# Patient Record
Sex: FEMALE | Race: WHITE | Employment: UNEMPLOYED | URBAN - NONMETROPOLITAN AREA
[De-identification: names, ages, dates, MRNs, and addresses within clinical notes are randomized per-mention and may not be internally consistent; named-entity substitution may affect disease eponyms.]

---

## 2017-02-20 DIAGNOSIS — Z12.31 ENCOUNTER FOR SCREENING MAMMOGRAM FOR BREAST CANCER: Primary | ICD-10-CM

## 2017-02-21 ENCOUNTER — TELEPHONE (OUTPATIENT)
Dept: NEUROLOGY | Age: 42
End: 2017-02-21

## 2017-03-20 RX ORDER — LEVONORGESTREL AND ETHINYL ESTRADIOL 90; 20 UG/1; UG/1
1 TABLET ORAL DAILY
Qty: 28 TABLET | Refills: 1 | Status: SHIPPED | OUTPATIENT
Start: 2017-03-20 | End: 2017-06-15 | Stop reason: SDUPTHER

## 2017-04-26 ENCOUNTER — OFFICE VISIT (OUTPATIENT)
Dept: OBSTETRICS AND GYNECOLOGY | Facility: CLINIC | Age: 42
End: 2017-04-26

## 2017-04-26 ENCOUNTER — TRANSCRIBE ORDERS (OUTPATIENT)
Dept: ADMINISTRATIVE | Facility: HOSPITAL | Age: 42
End: 2017-04-26

## 2017-04-26 ENCOUNTER — HOSPITAL ENCOUNTER (OUTPATIENT)
Dept: GENERAL RADIOLOGY | Facility: HOSPITAL | Age: 42
Discharge: HOME | End: 2017-04-26
Attending: PAIN MEDICINE | Admitting: PAIN MEDICINE

## 2017-04-26 VITALS
HEIGHT: 67 IN | BODY MASS INDEX: 26.68 KG/M2 | SYSTOLIC BLOOD PRESSURE: 110 MMHG | WEIGHT: 170 LBS | DIASTOLIC BLOOD PRESSURE: 84 MMHG

## 2017-04-26 DIAGNOSIS — Z00.00 WELL WOMAN EXAM WITHOUT GYNECOLOGICAL EXAM: Primary | ICD-10-CM

## 2017-04-26 DIAGNOSIS — E34.9 HORMONE DISORDER: ICD-10-CM

## 2017-04-26 DIAGNOSIS — M47.812 ARTHROPATHY OF CERVICAL FACET JOINT: Primary | ICD-10-CM

## 2017-04-26 DIAGNOSIS — M50.30 DEGENERATION OF CERVICAL INTERVERTEBRAL DISC: ICD-10-CM

## 2017-04-26 DIAGNOSIS — G43.009 NONINTRACTABLE MIGRAINE, UNSPECIFIED MIGRAINE TYPE: ICD-10-CM

## 2017-04-26 DIAGNOSIS — E55.9 VITAMIN D DEFICIENCY: ICD-10-CM

## 2017-04-26 DIAGNOSIS — Z12.31 ENCOUNTER FOR SCREENING MAMMOGRAM FOR MALIGNANT NEOPLASM OF BREAST: ICD-10-CM

## 2017-04-26 PROCEDURE — 87624 HPV HI-RISK TYP POOLED RSLT: CPT | Performed by: NURSE PRACTITIONER

## 2017-04-26 PROCEDURE — 99395 PREV VISIT EST AGE 18-39: CPT | Performed by: NURSE PRACTITIONER

## 2017-04-26 PROCEDURE — 87798 DETECT AGENT NOS DNA AMP: CPT | Performed by: NURSE PRACTITIONER

## 2017-04-26 PROCEDURE — 87481 CANDIDA DNA AMP PROBE: CPT | Performed by: NURSE PRACTITIONER

## 2017-04-26 PROCEDURE — 72050 X-RAY EXAM NECK SPINE 4/5VWS: CPT

## 2017-04-26 PROCEDURE — 87661 TRICHOMONAS VAGINALIS AMPLIF: CPT | Performed by: NURSE PRACTITIONER

## 2017-04-26 PROCEDURE — 87512 GARDNER VAG DNA QUANT: CPT | Performed by: NURSE PRACTITIONER

## 2017-04-26 PROCEDURE — G0123 SCREEN CERV/VAG THIN LAYER: HCPCS | Performed by: NURSE PRACTITIONER

## 2017-04-26 RX ORDER — GABAPENTIN 300 MG/1
CAPSULE ORAL
Refills: 0 | COMMUNITY
Start: 2017-03-27 | End: 2020-07-14

## 2017-04-26 RX ORDER — DULOXETIN HYDROCHLORIDE 60 MG/1
CAPSULE, DELAYED RELEASE ORAL
Refills: 0 | COMMUNITY
Start: 2017-03-27 | End: 2020-07-14

## 2017-04-26 RX ORDER — RIZATRIPTAN BENZOATE 10 MG/1
10 TABLET ORAL ONCE AS NEEDED
Refills: 0 | COMMUNITY
Start: 2017-02-21 | End: 2021-09-24 | Stop reason: HOSPADM

## 2017-04-26 RX ORDER — NORTRIPTYLINE HYDROCHLORIDE 75 MG/1
CAPSULE ORAL
Refills: 0 | COMMUNITY
Start: 2017-03-27 | End: 2020-07-14

## 2017-04-26 RX ORDER — BUTALBITAL, ACETAMINOPHEN AND CAFFEINE 50; 325; 40 MG/1; MG/1; MG/1
1 TABLET ORAL EVERY 4 HOURS PRN
COMMUNITY
Start: 2017-04-24 | End: 2021-09-24 | Stop reason: HOSPADM

## 2017-04-26 RX ORDER — TIZANIDINE 4 MG/1
TABLET ORAL
Refills: 0 | Status: ON HOLD | COMMUNITY
Start: 2017-03-27 | End: 2021-09-10

## 2017-04-26 NOTE — PROGRESS NOTES
Subjective   Namita Zabala is a 41 y.o. female  YOB: 1975    Est PT is here today for yearly visit.  Pt states that she is doing good with no c/o  Pt does need order for Mammo today as well      Chief Complaint   Patient presents with   • Gynecologic Exam     Here for annual exam, pap smear.  LPS 4-14-16 WNL. Hx of migraines with treatment per Pain Management, injections. She is feeling very dizzy today after injections. Has mammogram scheduled for May 2017.        HPI    The following portions of the patient's history were reviewed and updated as appropriate: allergies, current medications, past family history, past medical history, past social history, past surgical history and problem list.    Allergies   Allergen Reactions   • Penicillins        Past Medical History:   Diagnosis Date   • Angina at rest    • Migraine     Sees Pain Management for injections.    • MVP (mitral valve prolapse)    • Syncope        Family History   Problem Relation Age of Onset   • Colon cancer Maternal Aunt 52   • Fibromyalgia Mother    • Heart disease Mother    • Hypertension Mother    • Hodgkin's lymphoma Paternal Grandmother    • Ovarian cancer Neg Hx    • Breast cancer Neg Hx        Social History     Social History   • Marital status:      Spouse name: N/A   • Number of children: N/A   • Years of education: N/A     Occupational History   • Not on file.     Social History Main Topics   • Smoking status: Never Smoker   • Smokeless tobacco: Never Used   • Alcohol use No   • Drug use: No   • Sexual activity: Not on file     Other Topics Concern   • Not on file     Social History Narrative         Current Outpatient Prescriptions:   •  butalbital-acetaminophen-caffeine (FIORICET, ESGIC) -40 MG per tablet, , Disp: , Rfl:   •  DULoxetine (CYMBALTA) 60 MG capsule, TK 1 C PO HS, Disp: , Rfl: 0  •  gabapentin (NEURONTIN) 300 MG capsule, TK 3 CS PO HS, Disp: , Rfl: 0  •  levonorgestrel-ethinyl  estradiol (LYBREL) 90-20 MCG per tablet, Take 1 tablet by mouth Daily., Disp: 28 tablet, Rfl: 1  •  nortriptyline (PAMELOR) 75 MG capsule, TK 1 C PO HS, Disp: , Rfl: 0  •  rizatriptan (MAXALT) 10 MG tablet, TK 1 T PO AT ONSET OF HEADACHE. MAY REPEAT EVERY 2 HOURS AS NEEDED. MAX OF 3 TABLETS IN 24 HOURS, Disp: , Rfl: 0  •  tiZANidine (ZANAFLEX) 4 MG tablet, TK 1 T PO TID, Disp: , Rfl: 0    Menstrual History:  OB History      Para Term  AB TAB SAB Ectopic Multiple Living    0 0 0 0 0 0 0 0 0 0           No LMP recorded. Patient is not currently having periods (Reason: Oral contraceptives).    Sexual History:         Could not be calculated    Past Surgical History:   Procedure Laterality Date   • BREAST BIOPSY     • TONSILLECTOMY         Review of Systems   Constitutional: Negative for activity change, appetite change, chills, diaphoresis, fatigue, fever and unexpected weight change.   HENT: Negative for congestion, ear discharge, ear pain, facial swelling, hearing loss, mouth sores, nosebleeds, postnasal drip, rhinorrhea, sinus pressure, sneezing, sore throat, tinnitus, trouble swallowing and voice change.    Eyes: Negative for photophobia, pain, discharge, redness, itching and visual disturbance.   Respiratory: Negative for apnea, cough, choking, chest tightness and shortness of breath.    Cardiovascular: Negative for chest pain, palpitations and leg swelling.   Gastrointestinal: Negative for abdominal distention, abdominal pain, anal bleeding, blood in stool, constipation, diarrhea, nausea, rectal pain and vomiting.   Endocrine: Negative for cold intolerance and heat intolerance.   Genitourinary: Negative for decreased urine volume, difficulty urinating, dyspareunia, flank pain, frequency, genital sores, hematuria, menstrual problem, pelvic pain, urgency, vaginal bleeding, vaginal discharge and vaginal pain.   Musculoskeletal: Negative for arthralgias, back pain, joint swelling and myalgias.   Skin:  Negative for color change and rash.   Allergic/Immunologic: Negative for environmental allergies.   Neurological: Negative for dizziness, syncope, weakness, numbness and headaches.   Hematological: Negative for adenopathy.   Psychiatric/Behavioral: Negative for agitation, confusion and sleep disturbance. The patient is not nervous/anxious.        Objective   Physical Exam   Constitutional: She is oriented to person, place, and time. She appears well-developed and well-nourished. No distress.   HENT:   Head: Normocephalic.   Right Ear: External ear normal.   Left Ear: External ear normal.   Nose: Nose normal.   Mouth/Throat: Oropharynx is clear and moist.   Eyes: Conjunctivae are normal. Right eye exhibits no discharge. Left eye exhibits no discharge.   Neck: Normal range of motion. Neck supple. Carotid bruit is not present. No tracheal deviation present. No thyromegaly present.   Cardiovascular: Normal rate, regular rhythm, normal heart sounds and intact distal pulses.    No murmur heard.  Pulmonary/Chest: Effort normal and breath sounds normal. No respiratory distress. She has no wheezes. Right breast exhibits no inverted nipple, no mass, no nipple discharge, no skin change and no tenderness. Left breast exhibits no inverted nipple, no mass, no nipple discharge, no skin change and no tenderness. Breasts are symmetrical. There is no breast swelling.   Abdominal: Soft. She exhibits no distension and no mass. There is no tenderness. There is no guarding. No hernia. Hernia confirmed negative in the right inguinal area and confirmed negative in the left inguinal area.   Genitourinary: Rectum normal, vagina normal and uterus normal. Rectal exam shows no external hemorrhoid, no internal hemorrhoid, no fissure, no mass, no tenderness and anal tone normal. No breast tenderness, discharge or bleeding. Pelvic exam was performed with patient supine. There is no rash, tenderness, lesion or injury on the right labia. There is no  "rash, tenderness, lesion or injury on the left labia. Uterus is not enlarged, not fixed and not tender. Cervix exhibits no motion tenderness, no discharge and no friability. Right adnexum displays no mass, no tenderness and no fullness. Left adnexum displays no mass, no tenderness and no fullness. No bleeding in the vagina. No vaginal discharge found.   Genitourinary Comments:   BSU normal  Urethral meatus  Normal  Perineum  Normal   Musculoskeletal: Normal range of motion. She exhibits no edema or tenderness.   Lymphadenopathy:        Head (right side): No submental, no submandibular, no tonsillar, no preauricular, no posterior auricular and no occipital adenopathy present.        Head (left side): No submental, no submandibular, no tonsillar, no preauricular, no posterior auricular and no occipital adenopathy present.     She has no cervical adenopathy.        Right cervical: No superficial cervical, no deep cervical and no posterior cervical adenopathy present.       Left cervical: No superficial cervical, no deep cervical and no posterior cervical adenopathy present.     She has no axillary adenopathy.        Right: No inguinal adenopathy present.        Left: No inguinal adenopathy present.   Neurological: She is alert and oriented to person, place, and time. Coordination normal.   Skin: Skin is warm and dry. No bruising and no rash noted. She is not diaphoretic. No erythema.   Psychiatric: She has a normal mood and affect. Her behavior is normal. Judgment and thought content normal.   Nursing note and vitals reviewed.        Vitals:    04/26/17 1055   BP: 110/84   BP Location: Left arm   Patient Position: Sitting   Cuff Size: Adult   Weight: 170 lb (77.1 kg)   Height: 67\" (170.2 cm)       Namita was seen today for gynecologic exam.    Diagnoses and all orders for this visit:    Well woman exam without gynecological exam  Comments:  Normal well woman exam.  Thin prep done.  Mammogram ordered.  "     Nonintractable migraine, unspecified migraine type  Comments:  In Pain Management r/t migraines and neck issues.  Patient has lab order from Dr. Adair's office requesting a hormone panel, D and LFTs.    Orders:  -     Liquid-based Pap Smear, Screening  -     Cortisol  -     DHEA-Sulfate  -     Estradiol  -     Follicle Stimulating Hormone  -     Luteinizing Hormone  -     Progesterone  -     Testosterone  -     Hepatic Function Panel; Future    Hormone disorder  Comments:  Patient brought lab order from Dr. Adair requesting a hormone panel be done.  To be done today.   Orders:  -     Liquid-based Pap Smear, Screening  -     Cortisol  -     DHEA-Sulfate  -     Estradiol  -     Follicle Stimulating Hormone  -     Luteinizing Hormone  -     Progesterone  -     Testosterone  -     Hepatic Function Panel; Future    Vitamin D deficiency  Comments:  Vitamin D level to be done today.    Orders:  -     Vitamin D 25 Hydroxy    Encounter for screening mammogram for malignant neoplasm of breast  Comments:  Mammogram ordered.   Orders:  -     Mammo Screening Digital Tomosynthesis Bilateral With CAD; Future        Body mass index is 26.63 kg/(m^2).     Non-Smoker    MyChart Instructions Given

## 2017-04-27 LAB
25(OH)D3+25(OH)D2 SERPL-MCNC: 44.8 NG/ML (ref 30–100)
CORTIS SERPL-MCNC: 14.6 UG/DL
DHEA-S SERPL-MCNC: 102 UG/DL (ref 57.3–279.2)
ESTRADIOL SERPL-MCNC: 7 PG/ML
FSH SERPL-ACNC: 4.3 MIU/ML
LH SERPL-ACNC: 3.3 MIU/ML
PROGEST SERPL-MCNC: <0.1 NG/ML
TESTOST SERPL-MCNC: 11 NG/DL (ref 8–48)

## 2017-05-01 ENCOUNTER — TELEPHONE (OUTPATIENT)
Dept: OBSTETRICS AND GYNECOLOGY | Facility: CLINIC | Age: 42
End: 2017-05-01

## 2017-05-01 ENCOUNTER — RESULTS ENCOUNTER (OUTPATIENT)
Dept: OBSTETRICS AND GYNECOLOGY | Facility: CLINIC | Age: 42
End: 2017-05-01

## 2017-05-01 DIAGNOSIS — E34.9 HORMONE DISORDER: ICD-10-CM

## 2017-05-01 DIAGNOSIS — B37.31 YEAST VAGINITIS: Primary | ICD-10-CM

## 2017-05-01 DIAGNOSIS — G43.009 NONINTRACTABLE MIGRAINE, UNSPECIFIED MIGRAINE TYPE: ICD-10-CM

## 2017-05-03 LAB
GEN CATEG CVX/VAG CYTO-IMP: ABNORMAL
LAB AP CASE REPORT: ABNORMAL
LAB AP GYN ADDITIONAL INFORMATION: ABNORMAL
LAB AP GYN OTHER FINDINGS: ABNORMAL
Lab: ABNORMAL
PATH INTERP SPEC-IMP: ABNORMAL
STAT OF ADQ CVX/VAG CYTO-IMP: ABNORMAL

## 2017-05-04 ENCOUNTER — TELEPHONE (OUTPATIENT)
Dept: OBSTETRICS AND GYNECOLOGY | Facility: CLINIC | Age: 42
End: 2017-05-04

## 2017-05-05 ENCOUNTER — TELEPHONE (OUTPATIENT)
Dept: OBSTETRICS AND GYNECOLOGY | Facility: CLINIC | Age: 42
End: 2017-05-05

## 2017-05-09 ENCOUNTER — APPOINTMENT (OUTPATIENT)
Dept: MAMMOGRAPHY | Facility: HOSPITAL | Age: 42
End: 2017-05-09

## 2017-05-22 ENCOUNTER — HOSPITAL ENCOUNTER (OUTPATIENT)
Dept: MAMMOGRAPHY | Facility: HOSPITAL | Age: 42
Discharge: HOME | End: 2017-05-22
Admitting: NURSE PRACTITIONER

## 2017-05-22 DIAGNOSIS — Z12.31 ENCOUNTER FOR SCREENING MAMMOGRAM FOR BREAST CANCER: ICD-10-CM

## 2017-05-22 PROCEDURE — 77063 BREAST TOMOSYNTHESIS BI: CPT

## 2017-05-22 PROCEDURE — G0202 SCR MAMMO BI INCL CAD: HCPCS

## 2017-06-15 RX ORDER — LEVONORGESTREL AND ETHINYL ESTRADIOL 90; 20 UG/1; UG/1
1 TABLET ORAL DAILY
Qty: 28 TABLET | Refills: 9 | Status: SHIPPED | OUTPATIENT
Start: 2017-06-15 | End: 2017-06-19 | Stop reason: SDUPTHER

## 2017-06-19 RX ORDER — LEVONORGESTREL AND ETHINYL ESTRADIOL 90; 20 UG/1; UG/1
1 TABLET ORAL DAILY
Qty: 28 TABLET | Refills: 9 | Status: SHIPPED | OUTPATIENT
Start: 2017-06-19 | End: 2018-02-07 | Stop reason: SDUPTHER

## 2017-07-07 ENCOUNTER — TELEPHONE (OUTPATIENT)
Dept: FAMILY MEDICINE CLINIC | Age: 42
End: 2017-07-07

## 2017-07-13 ENCOUNTER — HOSPITAL ENCOUNTER (EMERGENCY)
Facility: HOSPITAL | Age: 42
Discharge: HOME | End: 2017-07-14
Attending: EMERGENCY MEDICINE | Admitting: EMERGENCY MEDICINE

## 2017-07-13 DIAGNOSIS — R11.2 NON-INTRACTABLE VOMITING WITH NAUSEA, UNSPECIFIED VOMITING TYPE: Primary | ICD-10-CM

## 2017-07-13 PROCEDURE — 99284 EMERGENCY DEPT VISIT MOD MDM: CPT

## 2017-07-14 VITALS
WEIGHT: 168 LBS | TEMPERATURE: 98.2 F | OXYGEN SATURATION: 99 % | DIASTOLIC BLOOD PRESSURE: 78 MMHG | RESPIRATION RATE: 17 BRPM | HEIGHT: 67 IN | SYSTOLIC BLOOD PRESSURE: 125 MMHG | BODY MASS INDEX: 26.37 KG/M2 | HEART RATE: 78 BPM

## 2017-07-14 LAB
ALBUMIN SERPL-MCNC: 4.1 G/DL (ref 3.5–5)
ALBUMIN/GLOB SERPL: 1.3 G/DL (ref 1.1–2.5)
ALP SERPL-CCNC: 87 U/L (ref 24–120)
ALT SERPL W P-5'-P-CCNC: 29 U/L (ref 0–54)
ANION GAP SERPL CALCULATED.3IONS-SCNC: 11 MMOL/L (ref 4–13)
AST SERPL-CCNC: 26 U/L (ref 7–45)
BACTERIA UR QL AUTO: ABNORMAL /HPF
BASOPHILS # BLD AUTO: 0.05 10*3/MM3 (ref 0–0.2)
BASOPHILS NFR BLD AUTO: 0.9 % (ref 0–2)
BILIRUB SERPL-MCNC: 0.5 MG/DL (ref 0.1–1)
BILIRUB UR QL STRIP: NEGATIVE
BUN BLD-MCNC: 18 MG/DL (ref 5–21)
BUN/CREAT SERPL: 15.9 (ref 7–25)
CALCIUM SPEC-SCNC: 9 MG/DL (ref 8.4–10.4)
CHLORIDE SERPL-SCNC: 107 MMOL/L (ref 98–110)
CLARITY UR: ABNORMAL
CO2 SERPL-SCNC: 23 MMOL/L (ref 24–31)
COD CRY URNS QL: ABNORMAL /HPF
COLOR UR: ABNORMAL
CREAT BLD-MCNC: 1.13 MG/DL (ref 0.5–1.4)
DEPRECATED RDW RBC AUTO: 41.5 FL (ref 40–54)
EOSINOPHIL # BLD AUTO: 0.06 10*3/MM3 (ref 0–0.7)
EOSINOPHIL NFR BLD AUTO: 1.1 % (ref 0–4)
ERYTHROCYTE [DISTWIDTH] IN BLOOD BY AUTOMATED COUNT: 13.4 % (ref 12–15)
GFR SERPL CREATININE-BSD FRML MDRD: 53 ML/MIN/1.73
GLOBULIN UR ELPH-MCNC: 3.1 GM/DL
GLUCOSE BLD-MCNC: 95 MG/DL (ref 70–100)
GLUCOSE UR STRIP-MCNC: NEGATIVE MG/DL
HCT VFR BLD AUTO: 34.8 % (ref 37–47)
HGB BLD-MCNC: 11.5 G/DL (ref 12–16)
HGB UR QL STRIP.AUTO: NEGATIVE
HYALINE CASTS UR QL AUTO: ABNORMAL /LPF
IMM GRANULOCYTES # BLD: 0 10*3/MM3 (ref 0–0.03)
IMM GRANULOCYTES NFR BLD: 0 % (ref 0–5)
KETONES UR QL STRIP: ABNORMAL
LEUKOCYTE ESTERASE UR QL STRIP.AUTO: ABNORMAL
LIPASE SERPL-CCNC: 128 U/L (ref 23–203)
LYMPHOCYTES # BLD AUTO: 2.07 10*3/MM3 (ref 0.72–4.86)
LYMPHOCYTES NFR BLD AUTO: 39 % (ref 15–45)
MCH RBC QN AUTO: 28.2 PG (ref 28–32)
MCHC RBC AUTO-ENTMCNC: 33 G/DL (ref 33–36)
MCV RBC AUTO: 85.3 FL (ref 82–98)
MONOCYTES # BLD AUTO: 0.54 10*3/MM3 (ref 0.19–1.3)
MONOCYTES NFR BLD AUTO: 10.2 % (ref 4–12)
NEUTROPHILS # BLD AUTO: 2.59 10*3/MM3 (ref 1.87–8.4)
NEUTROPHILS NFR BLD AUTO: 48.8 % (ref 39–78)
NITRITE UR QL STRIP: NEGATIVE
PH UR STRIP.AUTO: 5.5 [PH] (ref 5–8)
PLATELET # BLD AUTO: 355 10*3/MM3 (ref 130–400)
PMV BLD AUTO: 9.7 FL (ref 6–12)
POTASSIUM BLD-SCNC: 3.4 MMOL/L (ref 3.5–5.3)
PROT SERPL-MCNC: 7.2 G/DL (ref 6.3–8.7)
PROT UR QL STRIP: ABNORMAL
RBC # BLD AUTO: 4.08 10*6/MM3 (ref 4.2–5.4)
RBC # UR: ABNORMAL /HPF
REF LAB TEST METHOD: ABNORMAL
SODIUM BLD-SCNC: 141 MMOL/L (ref 135–145)
SP GR UR STRIP: >1.03 (ref 1–1.03)
SQUAMOUS #/AREA URNS HPF: ABNORMAL /HPF
UROBILINOGEN UR QL STRIP: ABNORMAL
WBC NRBC COR # BLD: 5.31 10*3/MM3 (ref 4.8–10.8)
WBC UR QL AUTO: ABNORMAL /HPF

## 2017-07-14 PROCEDURE — 96375 TX/PRO/DX INJ NEW DRUG ADDON: CPT

## 2017-07-14 PROCEDURE — 83690 ASSAY OF LIPASE: CPT | Performed by: EMERGENCY MEDICINE

## 2017-07-14 PROCEDURE — 96361 HYDRATE IV INFUSION ADD-ON: CPT

## 2017-07-14 PROCEDURE — 87086 URINE CULTURE/COLONY COUNT: CPT | Performed by: EMERGENCY MEDICINE

## 2017-07-14 PROCEDURE — 25010000002 ONDANSETRON PER 1 MG: Performed by: EMERGENCY MEDICINE

## 2017-07-14 PROCEDURE — 96374 THER/PROPH/DIAG INJ IV PUSH: CPT

## 2017-07-14 PROCEDURE — 25010000002 METOCLOPRAMIDE PER 10 MG: Performed by: EMERGENCY MEDICINE

## 2017-07-14 PROCEDURE — 85025 COMPLETE CBC W/AUTO DIFF WBC: CPT | Performed by: EMERGENCY MEDICINE

## 2017-07-14 PROCEDURE — 80053 COMPREHEN METABOLIC PANEL: CPT | Performed by: EMERGENCY MEDICINE

## 2017-07-14 PROCEDURE — 25010000002 DIPHENHYDRAMINE PER 50 MG: Performed by: EMERGENCY MEDICINE

## 2017-07-14 PROCEDURE — 81001 URINALYSIS AUTO W/SCOPE: CPT | Performed by: EMERGENCY MEDICINE

## 2017-07-14 RX ORDER — ONDANSETRON 2 MG/ML
4 INJECTION INTRAMUSCULAR; INTRAVENOUS ONCE
Status: COMPLETED | OUTPATIENT
Start: 2017-07-14 | End: 2017-07-14

## 2017-07-14 RX ORDER — PROMETHAZINE HYDROCHLORIDE 25 MG/1
25 TABLET ORAL EVERY 6 HOURS PRN
Qty: 20 TABLET | Refills: 0 | Status: SHIPPED | OUTPATIENT
Start: 2017-07-14 | End: 2020-07-14

## 2017-07-14 RX ORDER — METOCLOPRAMIDE HYDROCHLORIDE 5 MG/ML
10 INJECTION INTRAMUSCULAR; INTRAVENOUS ONCE
Status: COMPLETED | OUTPATIENT
Start: 2017-07-14 | End: 2017-07-14

## 2017-07-14 RX ORDER — DIPHENHYDRAMINE HYDROCHLORIDE 50 MG/ML
25 INJECTION INTRAMUSCULAR; INTRAVENOUS ONCE
Status: COMPLETED | OUTPATIENT
Start: 2017-07-14 | End: 2017-07-14

## 2017-07-14 RX ORDER — ESZOPICLONE 3 MG/1
3 TABLET, FILM COATED ORAL NIGHTLY
COMMUNITY
End: 2021-09-24 | Stop reason: HOSPADM

## 2017-07-14 RX ADMIN — SODIUM CHLORIDE 1000 ML: 9 INJECTION, SOLUTION INTRAVENOUS at 02:12

## 2017-07-14 RX ADMIN — DIPHENHYDRAMINE HYDROCHLORIDE 25 MG: 50 INJECTION, SOLUTION INTRAMUSCULAR; INTRAVENOUS at 04:08

## 2017-07-14 RX ADMIN — METOCLOPRAMIDE 10 MG: 5 INJECTION, SOLUTION INTRAMUSCULAR; INTRAVENOUS at 04:06

## 2017-07-14 RX ADMIN — ONDANSETRON 4 MG: 2 INJECTION, SOLUTION INTRAMUSCULAR; INTRAVENOUS at 02:13

## 2017-07-14 NOTE — ED NOTES
Patient requesting that we provide her night time meds as she does not have them with her. Explained that md would have to order them and that nursing would speak to md.     Michelle Maciel RN  07/14/17 0150

## 2017-07-14 NOTE — ED PROVIDER NOTES
Subjective   HPI Comments: Patient is complaining of nausea and vomiting for the past 2 days.  She has had multiple episodes of vomiting.  She denies any diarrhea associated with the vomiting.  She has not had any any hematemesis associated with vomiting.  She denies any abdominal pain as well.  He should has not been over keep any fluids down since the vomiting began.  She does not know of any sick contacts recently.  There has been no fever associated with this vomiting.      History provided by:  Patient      Review of Systems   Constitutional: Negative for activity change, fatigue and fever.   HENT: Negative for congestion, ear pain, facial swelling, postnasal drip, rhinorrhea, sinus pressure, sore throat and trouble swallowing.    Eyes: Negative for photophobia and redness.   Respiratory: Negative for chest tightness, shortness of breath and wheezing.    Cardiovascular: Negative for chest pain and palpitations.   Gastrointestinal: Positive for nausea and vomiting. Negative for abdominal distention, abdominal pain and diarrhea.   Genitourinary: Negative for difficulty urinating, dysuria and flank pain.   Musculoskeletal: Negative for back pain and neck pain.   Skin: Negative for color change and wound.   Neurological: Positive for headaches. Negative for dizziness, speech difficulty, weakness and light-headedness.   Psychiatric/Behavioral: Negative for agitation, confusion, self-injury and suicidal ideas.       Past Medical History:   Diagnosis Date   • Angina at rest    • Migraine     Sees Pain Management for injections.    • MVP (mitral valve prolapse)    • Syncope        Allergies   Allergen Reactions   • Penicillins        Past Surgical History:   Procedure Laterality Date   • BREAST BIOPSY Right 2011    benign   • TONSILLECTOMY         Family History   Problem Relation Age of Onset   • Colon cancer Maternal Aunt 52   • Fibromyalgia Mother    • Heart disease Mother    • Hypertension Mother    • Hodgkin's  lymphoma Paternal Grandmother    • Ovarian cancer Neg Hx    • Breast cancer Neg Hx        Social History     Social History   • Marital status:      Spouse name: N/A   • Number of children: N/A   • Years of education: N/A     Social History Main Topics   • Smoking status: Never Smoker   • Smokeless tobacco: Never Used   • Alcohol use No   • Drug use: No   • Sexual activity: Not Asked     Other Topics Concern   • None     Social History Narrative           Objective   Physical Exam   Constitutional: She is oriented to person, place, and time. She appears well-developed and well-nourished. No distress.   HENT:   Head: Normocephalic and atraumatic.   Mouth/Throat: Oropharynx is clear and moist. No oropharyngeal exudate.   Eyes: EOM are normal. Pupils are equal, round, and reactive to light.   Neck: Normal range of motion. Neck supple. No JVD present.   Cardiovascular: Regular rhythm, S1 normal, S2 normal and normal heart sounds.  Tachycardia present.  Exam reveals no gallop and no friction rub.    No murmur heard.  Pulmonary/Chest: Effort normal and breath sounds normal. No respiratory distress. She has no wheezes. She has no rales.   Abdominal: Soft. Normal appearance and bowel sounds are normal. She exhibits no distension. There is no tenderness. There is no rigidity, no rebound and no guarding.   Neurological: She is alert and oriented to person, place, and time. No cranial nerve deficit.   Skin: Skin is warm and dry. No rash noted.   Psychiatric: She has a normal mood and affect. Her behavior is normal. Judgment and thought content normal.   Nursing note and vitals reviewed.      Procedures         ED Course  ED Course                  MDM  Number of Diagnoses or Management Options  Non-intractable vomiting with nausea, unspecified vomiting type: new and requires workup  Diagnosis management comments: Patient has not had any further vomiting while here in the ED.  She did continue to have a headache which we  treated with Reglan and Benadryl.  She has been sleeping with no further issues.  We will discharge her home with Phenergan.  She was told to follow up with her primary care.  Instructed to return to the ED if she has any further issues or new complaints.       Amount and/or Complexity of Data Reviewed  Clinical lab tests: ordered and reviewed  Tests in the medicine section of CPT®: ordered and reviewed  Independent visualization of images, tracings, or specimens: yes    Risk of Complications, Morbidity, and/or Mortality  Presenting problems: moderate  Diagnostic procedures: moderate  Management options: moderate    Patient Progress  Patient progress: stable      Final diagnoses:   Non-intractable vomiting with nausea, unspecified vomiting type            Frank Carter MD  07/14/17 0559

## 2017-07-14 NOTE — ED NOTES
Spoke with Dr Carter about patient stating that she needed her night time meds of neurontin, zanaflex and lunesta. Patient states that if she didn't get them, she may as well take out her iv and go home because she will get sick with out them. Dr Carter states that patient came in complaining of nausea and vomiting and he would not give oral medication at this time. Patient and her  informed     Michelle Maciel RN  07/14/17 0208

## 2017-07-16 LAB
BACTERIA SPEC AEROBE CULT: ABNORMAL
BACTERIA SPEC AEROBE CULT: ABNORMAL

## 2017-08-08 ENCOUNTER — OFFICE VISIT (OUTPATIENT)
Dept: NEUROSURGERY | Age: 42
End: 2017-08-08
Payer: COMMERCIAL

## 2017-08-08 VITALS
HEIGHT: 67 IN | SYSTOLIC BLOOD PRESSURE: 148 MMHG | BODY MASS INDEX: 26.68 KG/M2 | WEIGHT: 170 LBS | HEART RATE: 99 BPM | OXYGEN SATURATION: 100 % | DIASTOLIC BLOOD PRESSURE: 79 MMHG

## 2017-08-08 DIAGNOSIS — M54.2 NECK PAIN: ICD-10-CM

## 2017-08-08 DIAGNOSIS — R51.9 HEADACHE, UNSPECIFIED HEADACHE TYPE: Primary | ICD-10-CM

## 2017-08-08 PROCEDURE — 99204 OFFICE O/P NEW MOD 45 MIN: CPT | Performed by: PSYCHIATRY & NEUROLOGY

## 2017-08-08 RX ORDER — ONDANSETRON 4 MG/1
4 TABLET, ORALLY DISINTEGRATING ORAL PRN
COMMUNITY
End: 2017-10-16 | Stop reason: SDUPTHER

## 2017-08-08 RX ORDER — ESZOPICLONE 2 MG/1
2 TABLET, FILM COATED ORAL NIGHTLY
COMMUNITY
End: 2017-08-25 | Stop reason: SDUPTHER

## 2017-08-08 RX ORDER — GABAPENTIN 300 MG/1
CAPSULE ORAL
Qty: 120 CAPSULE | Refills: 5 | Status: SHIPPED | OUTPATIENT
Start: 2017-08-08 | End: 2018-01-15 | Stop reason: SDUPTHER

## 2017-08-08 RX ORDER — DULOXETIN HYDROCHLORIDE 60 MG/1
60 CAPSULE, DELAYED RELEASE ORAL DAILY
COMMUNITY
End: 2017-08-08 | Stop reason: SDUPTHER

## 2017-08-08 RX ORDER — FLUTICASONE PROPIONATE 50 MCG
1 SPRAY, SUSPENSION (ML) NASAL DAILY
COMMUNITY
End: 2017-08-24 | Stop reason: ALTCHOICE

## 2017-08-08 RX ORDER — TIZANIDINE 4 MG/1
4 TABLET ORAL NIGHTLY
COMMUNITY
End: 2018-08-23 | Stop reason: SDUPTHER

## 2017-08-08 RX ORDER — DULOXETIN HYDROCHLORIDE 60 MG/1
60 CAPSULE, DELAYED RELEASE ORAL DAILY
Qty: 30 CAPSULE | Refills: 11 | Status: SHIPPED | OUTPATIENT
Start: 2017-08-08 | End: 2018-08-23 | Stop reason: SDUPTHER

## 2017-08-08 RX ORDER — RIZATRIPTAN BENZOATE 10 MG/1
10 TABLET ORAL
Qty: 18 TABLET | Refills: 5 | Status: SHIPPED | OUTPATIENT
Start: 2017-08-08 | End: 2018-09-11 | Stop reason: SDUPTHER

## 2017-08-08 RX ORDER — LEVONORGESTREL AND ETHINYL ESTRADIOL 90; 20 UG/1; UG/1
1 TABLET ORAL DAILY
COMMUNITY
End: 2018-06-05 | Stop reason: SDUPTHER

## 2017-08-08 RX ORDER — GABAPENTIN 300 MG/1
300 CAPSULE ORAL 2 TIMES DAILY
COMMUNITY
End: 2017-08-08 | Stop reason: SDUPTHER

## 2017-08-14 DIAGNOSIS — M54.2 NECK PAIN: ICD-10-CM

## 2017-08-14 DIAGNOSIS — R51.9 HEADACHE, UNSPECIFIED HEADACHE TYPE: ICD-10-CM

## 2017-08-14 LAB
ALBUMIN SERPL-MCNC: 4.3 G/DL (ref 3.5–5.2)
ALP BLD-CCNC: 89 U/L (ref 35–104)
ALT SERPL-CCNC: 11 U/L (ref 5–33)
ANION GAP SERPL CALCULATED.3IONS-SCNC: 19 MMOL/L (ref 7–19)
AST SERPL-CCNC: 13 U/L (ref 5–32)
BASOPHILS ABSOLUTE: 0 K/UL (ref 0–0.2)
BASOPHILS RELATIVE PERCENT: 0.6 % (ref 0–1)
BILIRUB SERPL-MCNC: <0.2 MG/DL (ref 0.2–1.2)
BUN BLDV-MCNC: 12 MG/DL (ref 6–20)
C-REACTIVE PROTEIN: 0.38 MG/DL (ref 0–0.5)
CALCIUM SERPL-MCNC: 9.4 MG/DL (ref 8.6–10)
CHLORIDE BLD-SCNC: 100 MMOL/L (ref 98–111)
CO2: 21 MMOL/L (ref 22–29)
CREAT SERPL-MCNC: 1 MG/DL (ref 0.5–0.9)
EOSINOPHILS ABSOLUTE: 0.1 K/UL (ref 0–0.6)
EOSINOPHILS RELATIVE PERCENT: 1.1 % (ref 0–5)
GFR NON-AFRICAN AMERICAN: >60
GLUCOSE BLD-MCNC: 118 MG/DL (ref 74–109)
HCT VFR BLD CALC: 36.1 % (ref 37–47)
HEMOGLOBIN: 11.6 G/DL (ref 12–16)
LYMPHOCYTES ABSOLUTE: 1.6 K/UL (ref 1.1–4.5)
LYMPHOCYTES RELATIVE PERCENT: 28.7 % (ref 20–40)
MCH RBC QN AUTO: 28.8 PG (ref 27–31)
MCHC RBC AUTO-ENTMCNC: 32.1 G/DL (ref 33–37)
MCV RBC AUTO: 89.6 FL (ref 81–99)
MONOCYTES ABSOLUTE: 0.3 K/UL (ref 0–0.9)
MONOCYTES RELATIVE PERCENT: 5.1 % (ref 0–10)
NEUTROPHILS ABSOLUTE: 3.5 K/UL (ref 1.5–7.5)
NEUTROPHILS RELATIVE PERCENT: 64.1 % (ref 50–65)
PDW BLD-RTO: 14.1 % (ref 11.5–14.5)
PLATELET # BLD: 309 K/UL (ref 130–400)
PMV BLD AUTO: 9.9 FL (ref 9.4–12.3)
POTASSIUM SERPL-SCNC: 3.6 MMOL/L (ref 3.5–5)
RBC # BLD: 4.03 M/UL (ref 4.2–5.4)
RHEUMATOID FACTOR: <10 IU/ML
SEDIMENTATION RATE, ERYTHROCYTE: 32 MM/HR (ref 0–20)
SODIUM BLD-SCNC: 140 MMOL/L (ref 136–145)
T4 FREE: 1.1 NG/ML (ref 0.9–1.7)
TOTAL PROTEIN: 7.9 G/DL (ref 6.6–8.7)
TSH SERPL DL<=0.05 MIU/L-ACNC: 1.48 UIU/ML (ref 0.27–4.2)
VITAMIN B-12: 243 PG/ML (ref 211–946)
WBC # BLD: 5.4 K/UL (ref 4.8–10.8)

## 2017-08-15 ENCOUNTER — TELEPHONE (OUTPATIENT)
Dept: NEUROSURGERY | Age: 42
End: 2017-08-15

## 2017-08-15 DIAGNOSIS — E53.8 VITAMIN B12 DEFICIENCY: Primary | ICD-10-CM

## 2017-08-15 RX ORDER — CYANOCOBALAMIN 1000 UG/ML
1000 INJECTION INTRAMUSCULAR; SUBCUTANEOUS
Qty: 1 ML | Refills: 3 | Status: SHIPPED | OUTPATIENT
Start: 2017-08-15 | End: 2017-08-24 | Stop reason: ALTCHOICE

## 2017-08-17 LAB — ANA IGG, ELISA: NORMAL

## 2017-08-22 RX ORDER — BUTALBITAL, ACETAMINOPHEN AND CAFFEINE 50; 325; 40 MG/1; MG/1; MG/1
CAPSULE ORAL
Qty: 40 CAPSULE | Refills: 5 | Status: SHIPPED | OUTPATIENT
Start: 2017-08-22 | End: 2017-10-18 | Stop reason: SDUPTHER

## 2017-08-24 RX ORDER — NORTRIPTYLINE HYDROCHLORIDE 75 MG/1
75 CAPSULE ORAL NIGHTLY
COMMUNITY
End: 2017-08-25

## 2017-08-25 ENCOUNTER — OFFICE VISIT (OUTPATIENT)
Dept: FAMILY MEDICINE CLINIC | Age: 42
End: 2017-08-25
Payer: COMMERCIAL

## 2017-08-25 ENCOUNTER — NURSE ONLY (OUTPATIENT)
Dept: NEUROSURGERY | Age: 42
End: 2017-08-25
Payer: COMMERCIAL

## 2017-08-25 VITALS
BODY MASS INDEX: 26.68 KG/M2 | SYSTOLIC BLOOD PRESSURE: 170 MMHG | TEMPERATURE: 98.3 F | HEART RATE: 110 BPM | DIASTOLIC BLOOD PRESSURE: 100 MMHG | HEIGHT: 67 IN | RESPIRATION RATE: 18 BRPM | WEIGHT: 170 LBS | OXYGEN SATURATION: 98 %

## 2017-08-25 DIAGNOSIS — G43.709 CHRONIC MIGRAINE WITHOUT AURA WITHOUT STATUS MIGRAINOSUS, NOT INTRACTABLE: ICD-10-CM

## 2017-08-25 DIAGNOSIS — F51.01 PRIMARY INSOMNIA: ICD-10-CM

## 2017-08-25 DIAGNOSIS — M54.81 CERVICO-OCCIPITAL NEURALGIA: ICD-10-CM

## 2017-08-25 DIAGNOSIS — I10 ESSENTIAL (PRIMARY) HYPERTENSION: Primary | ICD-10-CM

## 2017-08-25 DIAGNOSIS — G44.89 CHRONIC MIXED HEADACHE SYNDROME: ICD-10-CM

## 2017-08-25 DIAGNOSIS — E53.8 VITAMIN B 12 DEFICIENCY: Primary | ICD-10-CM

## 2017-08-25 DIAGNOSIS — R55 VASOVAGAL SYMPTOM: ICD-10-CM

## 2017-08-25 PROCEDURE — 99214 OFFICE O/P EST MOD 30 MIN: CPT | Performed by: FAMILY MEDICINE

## 2017-08-25 PROCEDURE — 96372 THER/PROPH/DIAG INJ SC/IM: CPT | Performed by: PSYCHIATRY & NEUROLOGY

## 2017-08-25 RX ORDER — ESZOPICLONE 3 MG/1
3 TABLET, FILM COATED ORAL NIGHTLY
Qty: 30 TABLET | Refills: 3 | Status: SHIPPED | OUTPATIENT
Start: 2017-08-25 | End: 2017-12-25 | Stop reason: SDUPTHER

## 2017-08-25 RX ORDER — METOPROLOL SUCCINATE 25 MG/1
25 TABLET, EXTENDED RELEASE ORAL DAILY
Qty: 90 TABLET | Refills: 3 | Status: SHIPPED | OUTPATIENT
Start: 2017-08-25 | End: 2018-08-14 | Stop reason: SDUPTHER

## 2017-08-25 RX ADMIN — CYANOCOBALAMIN 1000 MCG: 1000 INJECTION INTRAMUSCULAR; SUBCUTANEOUS at 14:00

## 2017-08-25 RX ADMIN — CYANOCOBALAMIN 1000 MCG: 1000 INJECTION INTRAMUSCULAR; SUBCUTANEOUS at 16:00

## 2017-08-28 ENCOUNTER — HOSPITAL ENCOUNTER (OUTPATIENT)
Dept: MRI IMAGING | Age: 42
Discharge: HOME | End: 2017-08-28
Payer: COMMERCIAL

## 2017-08-28 DIAGNOSIS — R51.9 HEADACHE, UNSPECIFIED HEADACHE TYPE: ICD-10-CM

## 2017-08-28 DIAGNOSIS — E53.8 VITAMIN B12 DEFICIENCY: Primary | ICD-10-CM

## 2017-08-28 DIAGNOSIS — M54.2 NECK PAIN: ICD-10-CM

## 2017-08-28 PROCEDURE — 6360000004 HC RX CONTRAST MEDICATION: Performed by: PSYCHIATRY & NEUROLOGY

## 2017-08-28 PROCEDURE — 72141 MRI NECK SPINE W/O DYE: CPT

## 2017-08-28 PROCEDURE — A9577 INJ MULTIHANCE: HCPCS | Performed by: PSYCHIATRY & NEUROLOGY

## 2017-08-28 PROCEDURE — 70553 MRI BRAIN STEM W/O & W/DYE: CPT

## 2017-08-28 RX ORDER — CYANOCOBALAMIN 1000 UG/ML
1000 INJECTION INTRAMUSCULAR; SUBCUTANEOUS
Status: SHIPPED | OUTPATIENT
Start: 2017-08-28 | End: 2017-12-26

## 2017-08-28 RX ADMIN — GADOBENATE DIMEGLUMINE 15 ML: 529 INJECTION, SOLUTION INTRAVENOUS at 16:03

## 2017-08-30 PROBLEM — R55 VASOVAGAL SYMPTOM: Status: ACTIVE | Noted: 2017-08-30

## 2017-08-30 PROBLEM — G44.89 CHRONIC MIXED HEADACHE SYNDROME: Status: ACTIVE | Noted: 2017-08-30

## 2017-08-30 PROBLEM — I10 ESSENTIAL (PRIMARY) HYPERTENSION: Status: ACTIVE | Noted: 2017-08-30

## 2017-08-30 PROBLEM — G43.909 MIGRAINE WITHOUT STATUS MIGRAINOSUS, NOT INTRACTABLE: Status: ACTIVE | Noted: 2017-08-30

## 2017-08-30 PROBLEM — M54.81 CERVICO-OCCIPITAL NEURALGIA: Status: ACTIVE | Noted: 2017-08-30

## 2017-08-30 PROBLEM — G47.00 INSOMNIA: Status: ACTIVE | Noted: 2017-08-30

## 2017-08-30 PROBLEM — E78.01 FAMILIAL HYPERCHOLESTEROLEMIA: Status: ACTIVE | Noted: 2017-08-30

## 2017-09-05 RX ORDER — MONTELUKAST SODIUM 10 MG/1
10 TABLET ORAL NIGHTLY
Qty: 90 TABLET | Refills: 0 | Status: SHIPPED | OUTPATIENT
Start: 2017-09-05 | End: 2018-07-17 | Stop reason: SDUPTHER

## 2017-09-05 RX ORDER — MONTELUKAST SODIUM 10 MG/1
TABLET ORAL
Qty: 30 TABLET | Refills: 0 | Status: SHIPPED | OUTPATIENT
Start: 2017-09-05 | End: 2017-09-05 | Stop reason: SDUPTHER

## 2017-09-06 ENCOUNTER — PROCEDURE VISIT (OUTPATIENT)
Dept: NEUROSURGERY | Age: 42
End: 2017-09-06
Payer: COMMERCIAL

## 2017-09-06 VITALS
WEIGHT: 167 LBS | OXYGEN SATURATION: 98 % | BODY MASS INDEX: 26.21 KG/M2 | DIASTOLIC BLOOD PRESSURE: 73 MMHG | HEART RATE: 90 BPM | SYSTOLIC BLOOD PRESSURE: 101 MMHG | HEIGHT: 67 IN

## 2017-09-06 DIAGNOSIS — R51.9 HEADACHE, UNSPECIFIED HEADACHE TYPE: Primary | ICD-10-CM

## 2017-09-06 PROCEDURE — 64405 NJX AA&/STRD GR OCPL NRV: CPT | Performed by: PSYCHIATRY & NEUROLOGY

## 2017-10-16 ENCOUNTER — OFFICE VISIT (OUTPATIENT)
Dept: NEUROSURGERY | Age: 42
End: 2017-10-16
Payer: COMMERCIAL

## 2017-10-16 VITALS
HEIGHT: 67 IN | WEIGHT: 169 LBS | OXYGEN SATURATION: 98 % | SYSTOLIC BLOOD PRESSURE: 135 MMHG | DIASTOLIC BLOOD PRESSURE: 77 MMHG | BODY MASS INDEX: 26.53 KG/M2 | HEART RATE: 69 BPM

## 2017-10-16 DIAGNOSIS — E53.8 VITAMIN B12 DEFICIENCY: ICD-10-CM

## 2017-10-16 DIAGNOSIS — R51.9 HEADACHE, UNSPECIFIED HEADACHE TYPE: Primary | ICD-10-CM

## 2017-10-16 DIAGNOSIS — M54.2 NECK PAIN: ICD-10-CM

## 2017-10-16 PROCEDURE — 99214 OFFICE O/P EST MOD 30 MIN: CPT | Performed by: PSYCHIATRY & NEUROLOGY

## 2017-10-16 PROCEDURE — 96372 THER/PROPH/DIAG INJ SC/IM: CPT | Performed by: PSYCHIATRY & NEUROLOGY

## 2017-10-16 RX ORDER — ONDANSETRON 4 MG/1
4 TABLET, ORALLY DISINTEGRATING ORAL EVERY 8 HOURS PRN
Qty: 20 TABLET | Refills: 3 | Status: SHIPPED | OUTPATIENT
Start: 2017-10-16 | End: 2018-12-26 | Stop reason: SDUPTHER

## 2017-10-16 RX ORDER — ONDANSETRON 4 MG/1
TABLET, FILM COATED ORAL
Qty: 20 TABLET | Refills: 0 | Status: SHIPPED | OUTPATIENT
Start: 2017-10-16 | End: 2018-08-23 | Stop reason: SDUPTHER

## 2017-10-16 RX ADMIN — CYANOCOBALAMIN 1000 MCG: 1000 INJECTION INTRAMUSCULAR; SUBCUTANEOUS at 15:25

## 2017-10-16 NOTE — PROGRESS NOTES
After obtaining consent, and per orders of Dr. Coral Brian, injection of Vitamin B12 given in Left deltoid by Jeremy Landers. Patient instructed to remain in clinic for 20 minutes afterwards, and to report any adverse reaction to me immediately.

## 2017-10-16 NOTE — PROGRESS NOTES
[facility] Neurology Office Note      Patient:   Stefani Charles  MR#:    [id number]  Account Number:                         YOB: 1975  Date of Evaluation:  10/16/2017  Time of Note:                          3:35 PM  Primary/Referring Physician:  Kenton Evans MD   Consulting Physician:  Adrianna Sam D.O.    FOLLOW UP VISIT    Chief Complaint   Patient presents with    Migraine     6 week follow up for migraine had Nerve block, patient states that she cannot tell any difference after injections    Injections     vitamin b12       HISTORY OF PRESENT ILLNESS    Stefani Charles is a 38y.o. year old female here for headaches, dizziness, and memory loss. Headaches present for quite sometime, present for the last 15 years, pain is typically posteriorly, migrate anteriorly, lasts hours, photophobia, and nausea noted. Status post occipital nerve block and did not help. Headache frequency is almost daily, currently on esgic and maxalt prn. She is on cymbalta 60 and neurontin 300/900 for prevention. Failed topamax, botox, propranolol, nortriptyline, depakote in the past.  Prior MRI normal 2015. Seen by Dr. Mathieu Sierra in the past.  Seen at [facility] in the past as well. Seen in pain management in the past.  Prior workup for dizziness and syncope negative, cardiac workup completed, ECHO, Holter, EEG. Tilt table completed in the past.  No other complaints today.      Past Medical History:   Diagnosis Date    Blurred vision     due to BP problems    Chronic sinusitis     GERD (gastroesophageal reflux disease)     Headache(784.0)     migraines    Hypertension     MVP (mitral valve prolapse)     Syncope, cardiogenic     Varicose veins     Wears glasses        Past Surgical History:   Procedure Laterality Date    BREAST BIOPSY  12/2/2011    US guided right, benign fibroadenoma    TONSILLECTOMY AND ADENOIDECTOMY         Family History   Problem Relation Age of Onset    Other Mother      hypoglycemia    Heart Failure Mother     Cancer Paternal Grandmother      Hodgkin's       Social History     Social History    Marital status:      Spouse name: N/A    Number of children: N/A    Years of education: N/A     Occupational History    Not on file. Social History Main Topics    Smoking status: Never Smoker    Smokeless tobacco: Never Used    Alcohol use No    Drug use: No    Sexual activity: Not Currently     Other Topics Concern    Not on file     Social History Narrative    No narrative on file       Current Outpatient Prescriptions   Medication Sig Dispense Refill    ondansetron (ZOFRAN) 4 MG tablet TAKE 1 TABLET BY MOUTH EVERY 8 HOURS AS NEEDED 20 tablet 0    montelukast (SINGULAIR) 10 MG tablet Take 1 tablet by mouth nightly 90 tablet 0    metoprolol succinate (TOPROL XL) 25 MG extended release tablet Take 1 tablet by mouth daily 90 tablet 3    eszopiclone (LUNESTA) 3 MG TABS Take 1 tablet by mouth nightly 30 tablet 3    Alfalfa 500 MG TABS Take by mouth      butalbital-apap-caffeine (ESGIC) -40 MG CAPS Take one po every 4 hrs as needed for HA 40 capsule 5    Oxymetazoline HCl (AFRIN 12 HOUR NA) by Nasal route as needed      levonorgestrel-ethinyl estradiol (GAUDENCIO) 90-20 MCG per tablet Take 1 tablet by mouth daily      tiZANidine (ZANAFLEX) 4 MG tablet Take 4 mg by mouth nightly      ondansetron (ZOFRAN-ODT) 4 MG disintegrating tablet Take 4 mg by mouth as needed for Nausea or Vomiting      rizatriptan (MAXALT) 10 MG tablet Take 1 tablet by mouth once as needed for Migraine May repeat x 1 after two hours, max 20 mg daily.  18 tablet 5    DULoxetine (CYMBALTA) 60 MG extended release capsule Take 1 capsule by mouth daily 30 capsule 11    gabapentin (NEURONTIN) 300 MG capsule 1 tab in morning and 3 tabs at night 120 capsule 5     Current Facility-Administered Medications   Medication Dose Route Frequency Provider Last Rate Last Dose    all unless marked  Endocrine: [] Cold Intolerance [] Heat Intolerance [] Polydipsia [] Polyphagia [] Polyuria  [x]Denies all unless marked  Allergic/Immunologic:[] Environmental Allergies [] Food Allergies [] Immunocompromised state  [x] Denies all unless marked    PHYSICAL EXAM  /77 (Site: Left Arm, Position: Sitting, Cuff Size: Large Adult)   Pulse 69   Ht 5' 7\" (1.702 m)   Wt 169 lb (76.7 kg)   SpO2 98%   BMI 26.47 kg/m²     Constitutional  No acute distress    HEENT- Conjunctiva normal.  No scars, masses, or lesions over external nose or ears  Extremities - No clubbing, cyanosis or edema  Skin  Warm, dry, and intact. No rash, erythema, or pallor    NEUROLOGICAL EXAM    Mental status   [x]Awake, alert, oriented   [x]Affect attention and concentration appear appropriate  [x]Recent and remote memory appears unremarkable  [x]Speech normal without dysarthria or aphasia, comprehension and repetition intact. COMMENTS:    Cranial Nerves [x]No VF deficit to confrontation,  no papilledema on fundoscopic exam.  [x]PERRLA, EOMI, no nystagmus, conjugate eye movements, no ptosis  [x]Face symmetric  [x]Facial sensation intact  [x]Tongue midline no atrophy or fasciculations present  [x]Palate midline, hearing to finger rub normal bilaterally  [x]Shoulder shrug and SCM testing normal bilaterally  COMMENTS:   Motor   [x]5/5 strength x 4 extremities  [x]Normal bulk and tone  [x]No tremor present  [x]No rigidity or bradykinesia noted  COMMENTS:   Sensory  [x]Sensation intact to light touch, pin prick, vibration, and proprioception BLE  [x]Sensation intact to light touch, pin prick, vibration, and proprioception BUE  COMMENTS:   Coordination [x]FTN normal bilaterally   [x]HTS normal bilaterally  [x]LOW normal bilaterally.    COMMENTS:   Reflexes  [x]Symmetric and non-pathological  [x]Toes down going bilaterally  [x]No clonus present  COMMENTS:   Gait                  [x]Normal steady gait    []Ataxic    []Spastic

## 2017-11-06 ENCOUNTER — HOSPITAL ENCOUNTER (OUTPATIENT)
Dept: NON INVASIVE DIAGNOSTICS | Age: 42
Discharge: HOME | End: 2017-11-06
Payer: COMMERCIAL

## 2017-11-06 ENCOUNTER — OFFICE VISIT (OUTPATIENT)
Dept: FAMILY MEDICINE CLINIC | Age: 42
End: 2017-11-06
Payer: COMMERCIAL

## 2017-11-06 VITALS
RESPIRATION RATE: 18 BRPM | WEIGHT: 173 LBS | HEART RATE: 78 BPM | SYSTOLIC BLOOD PRESSURE: 118 MMHG | OXYGEN SATURATION: 98 % | TEMPERATURE: 98.5 F | DIASTOLIC BLOOD PRESSURE: 92 MMHG | BODY MASS INDEX: 27.15 KG/M2 | HEIGHT: 67 IN

## 2017-11-06 DIAGNOSIS — M54.81 CERVICO-OCCIPITAL NEURALGIA: ICD-10-CM

## 2017-11-06 DIAGNOSIS — R00.2 PALPITATIONS: ICD-10-CM

## 2017-11-06 DIAGNOSIS — I10 ESSENTIAL (PRIMARY) HYPERTENSION: ICD-10-CM

## 2017-11-06 DIAGNOSIS — F51.01 PRIMARY INSOMNIA: ICD-10-CM

## 2017-11-06 DIAGNOSIS — G43.709 CHRONIC MIGRAINE WITHOUT AURA WITHOUT STATUS MIGRAINOSUS, NOT INTRACTABLE: ICD-10-CM

## 2017-11-06 DIAGNOSIS — I10 ESSENTIAL (PRIMARY) HYPERTENSION: Primary | ICD-10-CM

## 2017-11-06 DIAGNOSIS — G44.89 CHRONIC MIXED HEADACHE SYNDROME: ICD-10-CM

## 2017-11-06 LAB
ALBUMIN SERPL-MCNC: 4.4 G/DL (ref 3.5–5.2)
ALP BLD-CCNC: 101 U/L (ref 35–104)
ALT SERPL-CCNC: 11 U/L (ref 5–33)
ANION GAP SERPL CALCULATED.3IONS-SCNC: 17 MMOL/L (ref 7–19)
AST SERPL-CCNC: 14 U/L (ref 5–32)
BILIRUB SERPL-MCNC: <0.2 MG/DL (ref 0.2–1.2)
BUN BLDV-MCNC: 11 MG/DL (ref 6–20)
CALCIUM SERPL-MCNC: 9.4 MG/DL (ref 8.6–10)
CHLORIDE BLD-SCNC: 100 MMOL/L (ref 98–111)
CHOLESTEROL, TOTAL: 282 MG/DL (ref 160–199)
CO2: 23 MMOL/L (ref 22–29)
CREAT SERPL-MCNC: 0.9 MG/DL (ref 0.5–0.9)
GFR NON-AFRICAN AMERICAN: >60
GLUCOSE BLD-MCNC: 96 MG/DL (ref 74–109)
HDLC SERPL-MCNC: 87 MG/DL (ref 65–121)
LDL CHOLESTEROL CALCULATED: 174 MG/DL
POTASSIUM SERPL-SCNC: 3.7 MMOL/L (ref 3.5–5)
SODIUM BLD-SCNC: 140 MMOL/L (ref 136–145)
TOTAL PROTEIN: 7.6 G/DL (ref 6.6–8.7)
TRIGL SERPL-MCNC: 107 MG/DL (ref 0–149)

## 2017-11-06 PROCEDURE — 93226 XTRNL ECG REC<48 HR SCAN A/R: CPT

## 2017-11-06 PROCEDURE — 93225 XTRNL ECG REC<48 HRS REC: CPT

## 2017-11-06 PROCEDURE — 93005 ELECTROCARDIOGRAM TRACING: CPT

## 2017-11-06 PROCEDURE — 99214 OFFICE O/P EST MOD 30 MIN: CPT | Performed by: FAMILY MEDICINE

## 2017-11-06 RX ORDER — TRAZODONE HYDROCHLORIDE 50 MG/1
TABLET ORAL
Qty: 30 TABLET | Refills: 5 | Status: SHIPPED | OUTPATIENT
Start: 2017-11-06 | End: 2018-04-10 | Stop reason: SDUPTHER

## 2017-11-06 NOTE — PROGRESS NOTES
[address]  [address] [place] [address]  Dept: [phone]  Dept Fax: [phone]  Loc: [phone]    Amy Leon is a 38 y.o. female who presents today for her medical conditions/complaints as noted below. Amy Leon is here for 6 Month Follow-Up        HPI:   CC: Here today to discuss the following:    She continues to have issues with chronic headaches. She is currently seeing neurology, Dr. Yenifer Talley. She continues to have daily headaches but does report that they may have improved somewhat. He feels a component of her headaches are related to rebound headaches from her chronic use of Fioricet. When she was going to pain management, she was receiving the medication for daily use. She thinks her headaches may have improved somewhat. She still, however, has daily headaches. She continues to suffer from chronic insomnia. She's tried multiple sedatives in the past without success.         HPI    Past Medical History:   Diagnosis Date    Blurred vision     due to BP problems    Chronic sinusitis     GERD (gastroesophageal reflux disease)     Headache(784.0)     migraines    Hypertension     MVP (mitral valve prolapse)     Syncope, cardiogenic     Varicose veins     Wears glasses       Past Surgical History:   Procedure Laterality Date    BREAST BIOPSY  12/2/2011    US guided right, benign fibroadenoma    TONSILLECTOMY AND ADENOIDECTOMY         Family History   Problem Relation Age of Onset    Other Mother      hypoglycemia    Heart Failure Mother     Cancer Paternal Grandmother      Hodgkin's       Social History   Substance Use Topics    Smoking status: Never Smoker    Smokeless tobacco: Never Used    Alcohol use No      Current Outpatient Prescriptions   Medication Sig Dispense Refill    traZODone (DESYREL) 50 MG tablet 1-2 tablets at bedtime 30 tablet 5    ESGIC -40 MG CAPS TAKE 1 CAPSULE BY Negative for congestion. Respiratory: Negative for cough, chest tightness and shortness of breath. Cardiovascular: Negative for chest pain, palpitations and leg swelling. Gastrointestinal: Negative for abdominal pain, anal bleeding, constipation, diarrhea and nausea. Genitourinary: Negative for difficulty urinating. Psychiatric/Behavioral: Negative. See HPI for visit specific review of symptoms. All others negative      Objective:   BP (!) 118/92   Pulse 78   Temp 98.5 °F (36.9 °C) (Temporal)   Resp 18   Ht 5' 7\" (1.702 m)   Wt 173 lb (78.5 kg)   SpO2 98%   BMI 27.10 kg/m²   Physical Exam  Physical Exam   Constitutional: appears well-developed. does not appear ill. Eyes: Pupils are equal, round, and reactive to light. Neck: Normal range of motion. Neck supple. Cardiovascular: Normal rate and regular rhythm. Exam reveals no friction rub. No murmur heard. Pulmonary/Chest: Effort normal and breath sounds normal. No respiratory distress. He has no wheezes. no rales. Abdominal: Soft. Bowel sounds are normal. He exhibits no distension. There is no tenderness. There is no rebound and no guarding. Musculoskeletal: exhibits no edema. Neurological: alert. Psychiatric: normal mood and affect. behavior is normal.                 Assessment & Plan: The following diagnoses and conditions are stable with no further orders unless indicated:  1. Essential (primary) hypertension  Her blood pressure is slightly elevated today. Primarily her diastolic is above her baseline. She treats it more to her insomnia she has not slept well the last 3 nights. Since been a chronic issue for her  - Comprehensive Metabolic Panel; Future  - Lipid Panel; Future    2. Primary insomnia  This is continued to be an issue for her. I discussed with her biofeedback techniques. Recommended she seek behavioral health counseling as well. She does not stick to a specific sleep regimen.  Her  accompanies department 6. His chest pain or sustained palpitations. She has a history of neurocardiogenic syncope. She denies any syncope. Today's office visit was 35 minutes face-to-face time. - Holter Monitor 24 Hour; Future  - EKG 12 Lead; Future            Return in about 6 months (around 5/6/2018) for Yearly Physical.           Discussed use, benefit, and side effects of prescribed medications. All patient questions answered. Pt voiced understanding. Reviewed health maintenance. Instructed to continue current medications, diet and exercise. Patient agreed with treatment plan. Follow up as directed.

## 2017-11-15 RX ORDER — BUTALBITAL, ACETAMINOPHEN AND CAFFEINE 50; 325; 40 MG/1; MG/1; MG/1
50 CAPSULE ORAL EVERY 8 HOURS PRN
Qty: 20 CAPSULE | Refills: 0 | Status: SHIPPED | OUTPATIENT
Start: 2017-11-15 | End: 2017-12-01 | Stop reason: SDUPTHER

## 2017-11-15 NOTE — TELEPHONE ENCOUNTER
Requested Prescriptions     Pending Prescriptions Disp Refills    butalbital-apap-caffeine (ESGIC) -40 MG CAPS 20 capsule 0     Sig: Take 50 mg by mouth every 8 hours as needed for Headaches        Pt has went through #40 with 3 refills in a month.   Has appointment on 11/27/17

## 2017-11-25 ENCOUNTER — PATIENT MESSAGE (OUTPATIENT)
Dept: FAMILY MEDICINE CLINIC | Age: 42
End: 2017-11-25

## 2017-11-25 DIAGNOSIS — R00.2 PALPITATIONS: Primary | ICD-10-CM

## 2017-12-04 RX ORDER — BUTALBITAL, ACETAMINOPHEN AND CAFFEINE 50; 325; 40 MG/1; MG/1; MG/1
TABLET ORAL
Qty: 20 TABLET | Refills: 0 | Status: SHIPPED | OUTPATIENT
Start: 2017-12-04 | End: 2017-12-12 | Stop reason: SDUPTHER

## 2017-12-06 ENCOUNTER — TELEPHONE (OUTPATIENT)
Dept: CARDIOLOGY | Age: 42
End: 2017-12-06

## 2017-12-06 NOTE — TELEPHONE ENCOUNTER
Called pt to follow up on outside records pt stated that she has not had any cardiac records anywhere else. Pt was asked to bring in all medications in with her at the time of her appointment. .. Deanne JARRELL/KAMERON

## 2017-12-13 RX ORDER — BUTALBITAL, ACETAMINOPHEN AND CAFFEINE 50; 325; 40 MG/1; MG/1; MG/1
TABLET ORAL
Qty: 20 TABLET | Refills: 0 | Status: SHIPPED | OUTPATIENT
Start: 2017-12-13 | End: 2017-12-25 | Stop reason: SDUPTHER

## 2017-12-15 ENCOUNTER — OFFICE VISIT (OUTPATIENT)
Dept: FAMILY MEDICINE CLINIC | Age: 42
End: 2017-12-15
Payer: COMMERCIAL

## 2017-12-15 VITALS
DIASTOLIC BLOOD PRESSURE: 98 MMHG | RESPIRATION RATE: 16 BRPM | OXYGEN SATURATION: 99 % | TEMPERATURE: 98.3 F | SYSTOLIC BLOOD PRESSURE: 134 MMHG | BODY MASS INDEX: 26.16 KG/M2 | WEIGHT: 167 LBS | HEART RATE: 62 BPM

## 2017-12-15 DIAGNOSIS — R55 NEUROCARDIOGENIC SYNCOPE: ICD-10-CM

## 2017-12-15 DIAGNOSIS — R55 VASOVAGAL SYMPTOM: Primary | ICD-10-CM

## 2017-12-15 DIAGNOSIS — G43.709 CHRONIC MIGRAINE WITHOUT AURA WITHOUT STATUS MIGRAINOSUS, NOT INTRACTABLE: ICD-10-CM

## 2017-12-15 DIAGNOSIS — F51.01 PRIMARY INSOMNIA: ICD-10-CM

## 2017-12-15 DIAGNOSIS — G44.89 CHRONIC MIXED HEADACHE SYNDROME: ICD-10-CM

## 2017-12-15 DIAGNOSIS — M54.81 CERVICO-OCCIPITAL NEURALGIA: ICD-10-CM

## 2017-12-15 PROCEDURE — 96372 THER/PROPH/DIAG INJ SC/IM: CPT | Performed by: FAMILY MEDICINE

## 2017-12-15 PROCEDURE — 99214 OFFICE O/P EST MOD 30 MIN: CPT | Performed by: FAMILY MEDICINE

## 2017-12-15 RX ORDER — CYANOCOBALAMIN 1000 UG/ML
1000 INJECTION INTRAMUSCULAR; SUBCUTANEOUS ONCE
Status: COMPLETED | OUTPATIENT
Start: 2017-12-15 | End: 2017-12-15

## 2017-12-15 RX ADMIN — CYANOCOBALAMIN 1000 MCG: 1000 INJECTION INTRAMUSCULAR; SUBCUTANEOUS at 16:35

## 2017-12-15 NOTE — PROGRESS NOTES
No adverse effects associated with that medication. She continues to see neurology for her cervico-occipital neuralgia. She feels the Cymbalta is helping this pain as well. She does take Zanaflex from time to time but tries to limit it to the evening as it does cause drowsiness. She's found no oversedation with combination of trazodone temazepam and Zanaflex. Unfortunately insomnia has been a common trigger for her chronic headaches and this combination of medication at night has helped her sleep which has resulted in reduction in severity and frequency of her headaches.     HPI    Past Medical History:   Diagnosis Date    Blurred vision     due to BP problems    Chronic sinusitis     GERD (gastroesophageal reflux disease)     Headache(784.0)     migraines    Hypertension     MVP (mitral valve prolapse)     Syncope, cardiogenic     Varicose veins     Wears glasses       Past Surgical History:   Procedure Laterality Date    BREAST BIOPSY  12/2/2011    US guided right, benign fibroadenoma    TONSILLECTOMY AND ADENOIDECTOMY         Family History   Problem Relation Age of Onset    Other Mother      hypoglycemia    Heart Failure Mother     Cancer Paternal Grandmother      Hodgkin's       Social History   Substance Use Topics    Smoking status: Never Smoker    Smokeless tobacco: Never Used    Alcohol use No      Current Outpatient Prescriptions   Medication Sig Dispense Refill    butalbital-acetaminophen-caffeine (FIORICET, ESGIC) -40 MG per tablet TAKE 1 TABLET BY MOUTH EVERY 8 HOURS AS NEEDED FOR HEADACHES 20 tablet 0    traZODone (DESYREL) 50 MG tablet 1-2 tablets at bedtime 30 tablet 5    ondansetron (ZOFRAN) 4 MG tablet TAKE 1 TABLET BY MOUTH EVERY 8 HOURS AS NEEDED 20 tablet 0    ondansetron (ZOFRAN-ODT) 4 MG disintegrating tablet Take 1 tablet by mouth every 8 hours as needed for Nausea or Vomiting 20 tablet 3    montelukast (SINGULAIR) 10 MG tablet Take 1 tablet by mouth nightly 90 tablet 0    metoprolol succinate (TOPROL XL) 25 MG extended release tablet Take 1 tablet by mouth daily 90 tablet 3    eszopiclone (LUNESTA) 3 MG TABS Take 1 tablet by mouth nightly 30 tablet 3    Alfalfa 500 MG TABS Take by mouth      Oxymetazoline HCl (AFRIN 12 HOUR NA) by Nasal route as needed      levonorgestrel-ethinyl estradiol (GAUDENCIO) 90-20 MCG per tablet Take 1 tablet by mouth daily      tiZANidine (ZANAFLEX) 4 MG tablet Take 4 mg by mouth nightly      rizatriptan (MAXALT) 10 MG tablet Take 1 tablet by mouth once as needed for Migraine May repeat x 1 after two hours, max 20 mg daily. 18 tablet 5    DULoxetine (CYMBALTA) 60 MG extended release capsule Take 1 capsule by mouth daily 30 capsule 11    gabapentin (NEURONTIN) 300 MG capsule 1 tab in morning and 3 tabs at night (Patient taking differently: 300 mg 3 times daily 1 tab in morning and 3 tabs at night ) 120 capsule 5     Current Facility-Administered Medications   Medication Dose Route Frequency Provider Last Rate Last Dose    cyanocobalamin injection 1,000 mcg  1,000 mcg Intramuscular Q30 Days Juana Tenorio DO   1,000 mcg at 10/16/17 1525     Allergies   Allergen Reactions    Penicillins Other (See Comments)     Skin peeling off in insides of hands       Health Maintenance   Topic Date Due    HIV screen  07/13/1992    DTaP/Tdap/Td vaccine (1 - Tdap) 07/13/1996    Cervical cancer screen  07/13/1998    Flu vaccine (1) 09/01/2017    Lipid screen  11/06/2022       Subjective:      Review of Systems   Constitutional: Positive for fatigue. Negative for activity change, appetite change, chills and fever. HENT: Negative for congestion. Respiratory: Negative for cough, chest tightness and shortness of breath. Cardiovascular: Negative for chest pain, palpitations and leg swelling. Gastrointestinal: Negative for abdominal pain, anal bleeding, constipation, diarrhea and nausea. Genitourinary: Negative for difficulty urinating. Neurological: Positive for headaches. Negative for dizziness, tremors, seizures, syncope, speech difficulty, weakness, light-headedness and numbness. Psychiatric/Behavioral: Negative. See HPI for visit specific review of symptoms. All others negative      Objective:   BP (!) 134/98   Pulse 62   Temp 98.3 °F (36.8 °C)   Resp 16   Wt 167 lb (75.8 kg)   SpO2 99%   BMI 26.16 kg/m²   Physical Exam   Constitutional: She appears well-developed. She does not appear ill. Eyes: Pupils are equal, round, and reactive to light. Neck: Normal range of motion. Neck supple. Cardiovascular: Normal rate and regular rhythm. Exam reveals no friction rub. No murmur heard. Pulmonary/Chest: Effort normal and breath sounds normal. No respiratory distress. She has no wheezes. She has no rales. Abdominal: Soft. Bowel sounds are normal. She exhibits no distension. There is no tenderness. There is no rebound and no guarding. Musculoskeletal: She exhibits no edema. Neurological: She is alert. Psychiatric: She has a normal mood and affect. Her behavior is normal.         No results found for this or any previous visit (from the past 672 hour(s)). Assessment & Plan: The following same as above. diagnoses and conditions are stable with no further orders unless indicated:  1. Vasovagal symptom  She tells me her vasovagal symptoms associated with her neurocardiogenic syncope have resolved. She has not had episodes in over 2 years. 2. Neurocardiogenic syncope  Advised her to discontinue driving and to contact medical help immediately if she is developing the symptoms again. She appears to be very stable on her current medication regimen. 3. Cervico-occipital neuralgia  Continue with plan outlined by neurology. She appears to be tolerating these treatments well. She is having no adverse effects. 4. Chronic mixed headache syndrome  Stable    5.  Chronic migraine without aura without

## 2017-12-18 PROBLEM — R55 NEUROCARDIOGENIC SYNCOPE: Status: ACTIVE | Noted: 2017-12-18

## 2017-12-18 ASSESSMENT — ENCOUNTER SYMPTOMS
CHEST TIGHTNESS: 0
ANAL BLEEDING: 0
ABDOMINAL PAIN: 0
COUGH: 0
SHORTNESS OF BREATH: 0
CONSTIPATION: 0
DIARRHEA: 0
NAUSEA: 0

## 2017-12-25 DIAGNOSIS — F51.01 PRIMARY INSOMNIA: ICD-10-CM

## 2017-12-26 NOTE — TELEPHONE ENCOUNTER
Requested Prescriptions     Pending Prescriptions Disp Refills    eszopiclone (LUNESTA) 3 MG TABS [Pharmacy Med Name: ESZOPICLONE 3MG TABLETS] 30 tablet 0     Sig: TAKE 1 TABLET BY MOUTH EVERY NIGHT

## 2017-12-27 RX ORDER — BUTALBITAL, ACETAMINOPHEN AND CAFFEINE 50; 325; 40 MG/1; MG/1; MG/1
TABLET ORAL
Qty: 20 TABLET | Refills: 0 | Status: SHIPPED | OUTPATIENT
Start: 2017-12-27 | End: 2018-01-05 | Stop reason: SDUPTHER

## 2017-12-27 RX ORDER — ESZOPICLONE 3 MG/1
TABLET, FILM COATED ORAL
Qty: 30 TABLET | Refills: 0 | Status: SHIPPED | OUTPATIENT
Start: 2017-12-27 | End: 2018-01-24 | Stop reason: SDUPTHER

## 2017-12-29 ENCOUNTER — PATIENT MESSAGE (OUTPATIENT)
Dept: FAMILY MEDICINE CLINIC | Age: 42
End: 2017-12-29

## 2017-12-29 NOTE — TELEPHONE ENCOUNTER
From: Jonatan Godoy  To: Jade Christian MD  Sent: 2017 10:14 AM CST  Subject: Prescription Question    My  Kourtney ABDUL 3- ph. [phone]. He's stopping by in a hour to get samples of Humalog if available. He's in his way to work. Please call.

## 2018-01-08 RX ORDER — BUTALBITAL, ACETAMINOPHEN AND CAFFEINE 50; 325; 40 MG/1; MG/1; MG/1
TABLET ORAL
Qty: 20 TABLET | Refills: 0 | Status: SHIPPED | OUTPATIENT
Start: 2018-01-08 | End: 2018-01-15 | Stop reason: SDUPTHER

## 2018-01-10 ENCOUNTER — OFFICE VISIT (OUTPATIENT)
Dept: FAMILY MEDICINE CLINIC | Age: 43
End: 2018-01-10
Payer: COMMERCIAL

## 2018-01-10 VITALS
TEMPERATURE: 98.2 F | SYSTOLIC BLOOD PRESSURE: 138 MMHG | WEIGHT: 161 LBS | OXYGEN SATURATION: 98 % | DIASTOLIC BLOOD PRESSURE: 78 MMHG | HEART RATE: 88 BPM | HEIGHT: 67 IN | BODY MASS INDEX: 25.27 KG/M2

## 2018-01-10 DIAGNOSIS — N89.8 VAGINAL DISCHARGE: ICD-10-CM

## 2018-01-10 DIAGNOSIS — Z20.2 POSSIBLE EXPOSURE TO STD: ICD-10-CM

## 2018-01-10 DIAGNOSIS — Z20.2 POSSIBLE EXPOSURE TO STD: Primary | ICD-10-CM

## 2018-01-10 LAB
BACTERIA WET PREP: NORMAL
CLUE CELLS: NORMAL
EPITHELIAL CELLS WET PREP: NORMAL
RAPID HIV 1&2: NORMAL
RBC WET PREP: NORMAL
SOURCE WET PREP: NORMAL
TRICHOMONAS PREP: NORMAL
WBC WET PREP: NORMAL
YEAST WET PREP: NORMAL

## 2018-01-10 PROCEDURE — 99214 OFFICE O/P EST MOD 30 MIN: CPT | Performed by: NURSE PRACTITIONER

## 2018-01-10 ASSESSMENT — ENCOUNTER SYMPTOMS
WHEEZING: 0
SHORTNESS OF BREATH: 0
SORE THROAT: 0
NAUSEA: 0
DIARRHEA: 0
ABDOMINAL PAIN: 0
COUGH: 0
CHEST TIGHTNESS: 0

## 2018-01-10 NOTE — PROGRESS NOTES
Richie Del Castillo is a 38 y.o. female who presents today for   Chief Complaint   Patient presents with    Other     patient wants to be checked for std       HPI:  She developed vaginal discharge a month ago which has occurred intermittently since. Discharge has been malodorous but not colored. No abdominal or pelvic pain. No known fever. She is concerned about possible STD exposure from  but is not sure. LMP was 1 month ago. She is followed by GYN at [facility] but is switching to Dr. Romeo Loaiza at [facility]. She is on OCP and typically has no periods with current pill but occasionally has breakthrough bleeding. She had some bleeding last month. Review of Systems   Constitutional: Negative for chills and fever. HENT: Negative for congestion, ear pain and sore throat. Respiratory: Negative for cough, chest tightness, shortness of breath and wheezing. Cardiovascular: Negative for chest pain. Gastrointestinal: Negative for abdominal pain, diarrhea and nausea. Genitourinary: Positive for vaginal discharge. Negative for dysuria and frequency. Musculoskeletal: Negative for arthralgias and myalgias. Skin: Negative for rash.        Past Medical History:   Diagnosis Date    Blurred vision     due to BP problems    Chronic sinusitis     GERD (gastroesophageal reflux disease)     Headache(784.0)     migraines    Hypertension     MVP (mitral valve prolapse)     Syncope, cardiogenic     Varicose veins     Wears glasses        Current Outpatient Prescriptions   Medication Sig Dispense Refill    butalbital-acetaminophen-caffeine (FIORICET, ESGIC) -40 MG per tablet TAKE 1 TABLET BY MOUTH EVERY 8 HOURS AS NEEDED FOR HEADACHES 20 tablet 0    eszopiclone (LUNESTA) 3 MG TABS TAKE 1 TABLET BY MOUTH EVERY NIGHT 30 tablet 0    traZODone (DESYREL) 50 MG tablet 1-2 tablets at bedtime 30 tablet 5    ondansetron (ZOFRAN) 4 MG tablet TAKE 1 TABLET BY MOUTH EVERY 8 HOURS AS NEEDED 20 tablet 0

## 2018-01-11 ENCOUNTER — TELEPHONE (OUTPATIENT)
Dept: FAMILY MEDICINE CLINIC | Age: 43
End: 2018-01-11

## 2018-01-13 LAB
APTIMA MEDIA TYPE: NORMAL
CHLAMYDIA TRACHOMATIS AMPLIFIED DET: NEGATIVE
HERPES TYPE 1/2 IGM COMBINED: 0.54 IV
HERPES TYPE I/II IGG COMBINED: >22.4 IV
N GONORRHOEAE AMPLIFIED DET: NEGATIVE
SPECIMEN SOURCE: NORMAL

## 2018-01-15 RX ORDER — GABAPENTIN 300 MG/1
CAPSULE ORAL
Qty: 120 CAPSULE | Refills: 5 | Status: SHIPPED | OUTPATIENT
Start: 2018-01-15 | End: 2018-06-13 | Stop reason: SDUPTHER

## 2018-01-15 RX ORDER — BUTALBITAL, ACETAMINOPHEN AND CAFFEINE 50; 325; 40 MG/1; MG/1; MG/1
1 TABLET ORAL EVERY 8 HOURS
Qty: 20 TABLET | Refills: 0 | Status: SHIPPED | OUTPATIENT
Start: 2018-01-15 | End: 2018-01-24 | Stop reason: SDUPTHER

## 2018-01-24 ENCOUNTER — TELEPHONE (OUTPATIENT)
Dept: CARDIOLOGY | Age: 43
End: 2018-01-24

## 2018-01-24 DIAGNOSIS — F51.01 PRIMARY INSOMNIA: ICD-10-CM

## 2018-01-24 RX ORDER — BUTALBITAL, ACETAMINOPHEN AND CAFFEINE 50; 325; 40 MG/1; MG/1; MG/1
TABLET ORAL
Qty: 20 TABLET | Refills: 0 | Status: SHIPPED | OUTPATIENT
Start: 2018-01-24 | End: 2018-02-01 | Stop reason: SDUPTHER

## 2018-01-25 RX ORDER — ESZOPICLONE 3 MG/1
3 TABLET, FILM COATED ORAL NIGHTLY
Qty: 30 TABLET | Refills: 0 | Status: SHIPPED | OUTPATIENT
Start: 2018-01-25 | End: 2018-02-22 | Stop reason: SDUPTHER

## 2018-02-01 RX ORDER — BUTALBITAL, ACETAMINOPHEN AND CAFFEINE 50; 325; 40 MG/1; MG/1; MG/1
1 TABLET ORAL EVERY 6 HOURS PRN
Qty: 40 TABLET | Refills: 3 | Status: SHIPPED | OUTPATIENT
Start: 2018-02-01 | End: 2018-03-09 | Stop reason: SDUPTHER

## 2018-02-01 NOTE — TELEPHONE ENCOUNTER
From: Mary Kay Sharif  Sent: 2/1/2018 3:33 AM CST  Subject: Medication Renewal Request    Mary Kay Sharif would like a refill of the following medications:  butalbital-acetaminophen-caffeine (FIORICET, ESGIC) -40 MG per tablet Trung Rizzo DO]    Preferred pharmacy: [place] [id number], [address] [facility] [address] [phone]    Comment:

## 2018-02-01 NOTE — TELEPHONE ENCOUNTER
Requested Prescriptions     Pending Prescriptions Disp Refills    butalbital-acetaminophen-caffeine (FIORICET, ESGIC) -40 MG per tablet 20 tablet 0     Last filled: 1/24/18  Last Office Visit: 10/16/17  Next Office Visit: none  Last Isaias Pennrset: 1/8/18

## 2018-02-07 RX ORDER — LEVONORGESTREL AND ETHINYL ESTRADIOL 90; 20 UG/1; UG/1
1 TABLET ORAL DAILY
Qty: 84 TABLET | Refills: 0 | Status: SHIPPED | OUTPATIENT
Start: 2018-02-07 | End: 2020-07-14

## 2018-02-17 ENCOUNTER — HOSPITAL ENCOUNTER (EMERGENCY)
Age: 43
Discharge: HOME | End: 2018-02-17
Attending: EMERGENCY MEDICINE
Payer: COMMERCIAL

## 2018-02-17 VITALS
BODY MASS INDEX: 25.11 KG/M2 | RESPIRATION RATE: 18 BRPM | WEIGHT: 160 LBS | OXYGEN SATURATION: 98 % | SYSTOLIC BLOOD PRESSURE: 105 MMHG | HEART RATE: 70 BPM | TEMPERATURE: 98.8 F | HEIGHT: 67 IN | DIASTOLIC BLOOD PRESSURE: 70 MMHG

## 2018-02-17 DIAGNOSIS — R05.9 COUGH: Primary | ICD-10-CM

## 2018-02-17 DIAGNOSIS — R51.9 CHRONIC NONINTRACTABLE HEADACHE, UNSPECIFIED HEADACHE TYPE: ICD-10-CM

## 2018-02-17 DIAGNOSIS — G89.29 CHRONIC NONINTRACTABLE HEADACHE, UNSPECIFIED HEADACHE TYPE: ICD-10-CM

## 2018-02-17 DIAGNOSIS — R09.81 NASAL CONGESTION: ICD-10-CM

## 2018-02-17 LAB
RAPID INFLUENZA  B AGN: NEGATIVE
RAPID INFLUENZA A AGN: NEGATIVE

## 2018-02-17 PROCEDURE — 2580000003 HC RX 258: Performed by: EMERGENCY MEDICINE

## 2018-02-17 PROCEDURE — 99283 EMERGENCY DEPT VISIT LOW MDM: CPT

## 2018-02-17 PROCEDURE — 87804 INFLUENZA ASSAY W/OPTIC: CPT

## 2018-02-17 PROCEDURE — 96375 TX/PRO/DX INJ NEW DRUG ADDON: CPT

## 2018-02-17 PROCEDURE — 99284 EMERGENCY DEPT VISIT MOD MDM: CPT | Performed by: FAMILY MEDICINE

## 2018-02-17 PROCEDURE — 6360000002 HC RX W HCPCS: Performed by: EMERGENCY MEDICINE

## 2018-02-17 PROCEDURE — 96374 THER/PROPH/DIAG INJ IV PUSH: CPT

## 2018-02-17 PROCEDURE — 6370000000 HC RX 637 (ALT 250 FOR IP): Performed by: EMERGENCY MEDICINE

## 2018-02-17 RX ORDER — METOCLOPRAMIDE HYDROCHLORIDE 5 MG/ML
10 INJECTION INTRAMUSCULAR; INTRAVENOUS ONCE
Status: COMPLETED | OUTPATIENT
Start: 2018-02-17 | End: 2018-02-17

## 2018-02-17 RX ORDER — BENZONATATE 200 MG/1
200 CAPSULE ORAL 3 TIMES DAILY PRN
Qty: 21 CAPSULE | Refills: 0 | Status: SHIPPED | OUTPATIENT
Start: 2018-02-17 | End: 2018-02-24

## 2018-02-17 RX ORDER — AZITHROMYCIN 250 MG/1
500 TABLET, FILM COATED ORAL ONCE
Status: COMPLETED | OUTPATIENT
Start: 2018-02-17 | End: 2018-02-17

## 2018-02-17 RX ORDER — KETOROLAC TROMETHAMINE 30 MG/ML
30 INJECTION, SOLUTION INTRAMUSCULAR; INTRAVENOUS ONCE
Status: COMPLETED | OUTPATIENT
Start: 2018-02-17 | End: 2018-02-17

## 2018-02-17 RX ORDER — DEXAMETHASONE SODIUM PHOSPHATE 10 MG/ML
10 INJECTION, SOLUTION INTRAMUSCULAR; INTRAVENOUS ONCE
Status: COMPLETED | OUTPATIENT
Start: 2018-02-17 | End: 2018-02-17

## 2018-02-17 RX ORDER — DIPHENHYDRAMINE HYDROCHLORIDE 50 MG/ML
12.5 INJECTION INTRAMUSCULAR; INTRAVENOUS ONCE
Status: COMPLETED | OUTPATIENT
Start: 2018-02-17 | End: 2018-02-17

## 2018-02-17 RX ORDER — AZITHROMYCIN 250 MG/1
TABLET, FILM COATED ORAL
Qty: 1 PACKET | Refills: 0 | Status: SHIPPED | OUTPATIENT
Start: 2018-02-17 | End: 2018-08-23 | Stop reason: ALTCHOICE

## 2018-02-17 RX ORDER — 0.9 % SODIUM CHLORIDE 0.9 %
1000 INTRAVENOUS SOLUTION INTRAVENOUS ONCE
Status: COMPLETED | OUTPATIENT
Start: 2018-02-17 | End: 2018-02-17

## 2018-02-17 RX ADMIN — METOCLOPRAMIDE 10 MG: 5 INJECTION, SOLUTION INTRAMUSCULAR; INTRAVENOUS at 07:10

## 2018-02-17 RX ADMIN — DEXAMETHASONE SODIUM PHOSPHATE 10 MG: 10 INJECTION, SOLUTION INTRAMUSCULAR; INTRAVENOUS at 07:10

## 2018-02-17 RX ADMIN — DIPHENHYDRAMINE HYDROCHLORIDE 12.5 MG: 50 INJECTION, SOLUTION INTRAMUSCULAR; INTRAVENOUS at 07:10

## 2018-02-17 RX ADMIN — AZITHROMYCIN 500 MG: 250 TABLET, FILM COATED ORAL at 07:10

## 2018-02-17 RX ADMIN — KETOROLAC TROMETHAMINE 30 MG: 30 INJECTION, SOLUTION INTRAMUSCULAR; INTRAVENOUS at 07:10

## 2018-02-17 RX ADMIN — SODIUM CHLORIDE 1000 ML: 9 INJECTION, SOLUTION INTRAVENOUS at 07:10

## 2018-02-17 ASSESSMENT — ENCOUNTER SYMPTOMS
TROUBLE SWALLOWING: 0
EYE PAIN: 0
ABDOMINAL DISTENTION: 0
COLOR CHANGE: 0
PHOTOPHOBIA: 0
COUGH: 1
SHORTNESS OF BREATH: 0
BACK PAIN: 0
CHEST TIGHTNESS: 0
SORE THROAT: 0
ABDOMINAL PAIN: 0
NAUSEA: 1
SINUS PRESSURE: 1
CONSTIPATION: 0
VOMITING: 0
WHEEZING: 0
RHINORRHEA: 0
DIARRHEA: 0

## 2018-02-17 ASSESSMENT — PAIN SCALES - GENERAL: PAINLEVEL_OUTOF10: 4

## 2018-02-17 NOTE — ED PROVIDER NOTES
NA) by Nasal route as neededHistorical Med      levonorgestrel-ethinyl estradiol (GAUDENCIO) 90-20 MCG per tablet Take 1 tablet by mouth dailyHistorical Med      tiZANidine (ZANAFLEX) 4 MG tablet Take 4 mg by mouth nightlyHistorical Med      DULoxetine (CYMBALTA) 60 MG extended release capsule Take 1 capsule by mouth daily, Disp-30 capsule, R-11Normal      rizatriptan (MAXALT) 10 MG tablet Take 1 tablet by mouth once as needed for Migraine May repeat x 1 after two hours, max 20 mg daily. , Disp-18 tablet, R-5Normal             ALLERGIES     Penicillins    FAMILY HISTORY       Family History   Problem Relation Age of Onset    Other Mother      hypoglycemia    Heart Failure Mother     Cancer Paternal Grandmother      Hodgkin's          SOCIAL HISTORY       Social History     Social History    Marital status:      Spouse name: N/A    Number of children: N/A    Years of education: N/A     Social History Main Topics    Smoking status: Never Smoker    Smokeless tobacco: Never Used    Alcohol use No    Drug use: No    Sexual activity: Yes     Other Topics Concern    None     Social History Narrative    None       SCREENINGS             PHYSICAL EXAM    (up to 7 for level 4, 8 or more for level 5)     ED Triage Vitals [02/17/18 0610]   BP Temp Temp Source Pulse Resp SpO2 Height Weight   (!) 82/58 98.8 °F (37.1 °C) Oral 72 18 95 % 5' 7\" (1.702 m) 160 lb (72.6 kg)       Physical Exam   Constitutional: She is oriented to person, place, and time. She appears well-developed and well-nourished. No distress. HENT:   Head: Normocephalic and atraumatic. Mouth/Throat: Oropharynx is clear and moist.   Eyes: Conjunctivae and EOM are normal. Pupils are equal, round, and reactive to light. Neck: Normal range of motion. Neck supple. No JVD present. Cardiovascular: Normal rate, regular rhythm and normal heart sounds. Exam reveals no gallop. No murmur heard.   Pulmonary/Chest: Effort normal and breath sounds

## 2018-02-17 NOTE — ED NOTES
ASSESSMENT: Pt presents with nasal congestion and a cough. Pt says she believes she has the flu    PT ALERT/ORIENTED X4. PUPILS EQUAL/REACTIVE    SKIN:  WARM/DRY PINK CAPILLARY REFILL < 2SECS    CARDIAC:  S1 S2 NOTED     LUNGS: CLEAR UPPER AND LOWER LOBES, RESPIRATIONS EVEN/UNLABORED     ABDOMEN: BOWEL SOUNDS NOTED UPPER AND LOWER QUADRANTS                     SOFT AND NONTENDER. EXTREMITIES:  BILATERAL DP AND PT AND NO EDEMA NOTED. NO DISTRESS NOTED. SIDE RAILS UP AND CALL LIGHT IN REACH.      Vee Duke RN  02/17/18 [id number]

## 2018-02-22 DIAGNOSIS — F51.01 PRIMARY INSOMNIA: ICD-10-CM

## 2018-02-22 RX ORDER — ESZOPICLONE 3 MG/1
3 TABLET, FILM COATED ORAL NIGHTLY
Qty: 30 TABLET | Refills: 5 | Status: SHIPPED | OUTPATIENT
Start: 2018-02-22 | End: 2018-03-24

## 2018-02-22 RX ORDER — ESZOPICLONE 3 MG/1
3 TABLET, FILM COATED ORAL NIGHTLY
Qty: 30 TABLET | Refills: 0 | OUTPATIENT
Start: 2018-02-24 | End: 2018-03-26

## 2018-03-06 ENCOUNTER — TELEPHONE (OUTPATIENT)
Dept: NEUROSURGERY | Age: 43
End: 2018-03-06

## 2018-03-07 ENCOUNTER — TELEPHONE (OUTPATIENT)
Dept: NEUROSURGERY | Age: 43
End: 2018-03-07

## 2018-03-09 RX ORDER — BUTALBITAL, ACETAMINOPHEN AND CAFFEINE 50; 325; 40 MG/1; MG/1; MG/1
1 TABLET ORAL EVERY 6 HOURS PRN
Qty: 40 TABLET | Refills: 0 | Status: SHIPPED | OUTPATIENT
Start: 2018-03-14 | End: 2018-03-30 | Stop reason: SDUPTHER

## 2018-03-12 RX ORDER — BUTALBITAL, ACETAMINOPHEN AND CAFFEINE 50; 325; 40 MG/1; MG/1; MG/1
1 TABLET ORAL EVERY 6 HOURS PRN
Qty: 40 TABLET | Refills: 3 | Status: CANCELLED | OUTPATIENT
Start: 2018-03-12

## 2018-03-24 RX ORDER — BUTALBITAL, ACETAMINOPHEN AND CAFFEINE 50; 325; 40 MG/1; MG/1; MG/1
1 TABLET ORAL EVERY 6 HOURS PRN
Qty: 40 TABLET | Refills: 0 | Status: CANCELLED | OUTPATIENT
Start: 2018-03-24

## 2018-03-29 RX ORDER — BUTALBITAL, ACETAMINOPHEN AND CAFFEINE 50; 325; 40 MG/1; MG/1; MG/1
1 TABLET ORAL EVERY 6 HOURS PRN
Qty: 40 TABLET | Refills: 0 | Status: CANCELLED | OUTPATIENT
Start: 2018-03-29

## 2018-03-30 RX ORDER — BUTALBITAL, ACETAMINOPHEN AND CAFFEINE 50; 325; 40 MG/1; MG/1; MG/1
1 TABLET ORAL EVERY 6 HOURS PRN
Qty: 40 TABLET | Refills: 0 | Status: SHIPPED | OUTPATIENT
Start: 2018-03-30 | End: 2018-04-10 | Stop reason: SDUPTHER

## 2018-04-09 ENCOUNTER — TELEPHONE (OUTPATIENT)
Dept: OBGYN | Age: 43
End: 2018-04-09

## 2018-04-09 ENCOUNTER — OFFICE VISIT (OUTPATIENT)
Dept: OBGYN | Age: 43
End: 2018-04-09
Payer: COMMERCIAL

## 2018-04-09 VITALS
DIASTOLIC BLOOD PRESSURE: 96 MMHG | HEIGHT: 67 IN | SYSTOLIC BLOOD PRESSURE: 138 MMHG | BODY MASS INDEX: 25.11 KG/M2 | WEIGHT: 160 LBS

## 2018-04-09 DIAGNOSIS — Z12.4 CERVICAL CANCER SCREENING: ICD-10-CM

## 2018-04-09 DIAGNOSIS — N89.8 VAGINAL DISCHARGE: ICD-10-CM

## 2018-04-09 DIAGNOSIS — N92.1 BREAKTHROUGH BLEEDING ON BIRTH CONTROL PILLS: ICD-10-CM

## 2018-04-09 DIAGNOSIS — Z01.419 WOMEN'S ANNUAL ROUTINE GYNECOLOGICAL EXAMINATION: Primary | ICD-10-CM

## 2018-04-09 PROCEDURE — 99386 PREV VISIT NEW AGE 40-64: CPT | Performed by: OBSTETRICS & GYNECOLOGY

## 2018-04-09 PROCEDURE — 87210 SMEAR WET MOUNT SALINE/INK: CPT | Performed by: OBSTETRICS & GYNECOLOGY

## 2018-04-09 RX ORDER — FLUCONAZOLE 150 MG/1
150 TABLET ORAL ONCE
Qty: 1 TABLET | Refills: 0 | Status: SHIPPED | OUTPATIENT
Start: 2018-04-09 | End: 2018-04-09

## 2018-04-09 RX ORDER — LEVONORGESTREL AND ETHINYL ESTRADIOL 0.15-0.03
1 KIT ORAL DAILY
Qty: 1 PACKET | Refills: 3 | Status: SHIPPED | OUTPATIENT
Start: 2018-04-09 | End: 2018-06-05 | Stop reason: SDUPTHER

## 2018-04-09 ASSESSMENT — ENCOUNTER SYMPTOMS
EYES NEGATIVE: 1
GASTROINTESTINAL NEGATIVE: 1
RESPIRATORY NEGATIVE: 1

## 2018-04-10 RX ORDER — BUTALBITAL, ACETAMINOPHEN AND CAFFEINE 50; 325; 40 MG/1; MG/1; MG/1
1 TABLET ORAL EVERY 6 HOURS PRN
Qty: 40 TABLET | Refills: 0 | Status: SHIPPED | OUTPATIENT
Start: 2018-04-10 | End: 2018-04-23 | Stop reason: SDUPTHER

## 2018-04-11 ENCOUNTER — PATIENT MESSAGE (OUTPATIENT)
Dept: FAMILY MEDICINE CLINIC | Age: 43
End: 2018-04-11

## 2018-04-11 DIAGNOSIS — F51.01 PRIMARY INSOMNIA: ICD-10-CM

## 2018-04-12 RX ORDER — TRAZODONE HYDROCHLORIDE 50 MG/1
TABLET ORAL
Qty: 90 TABLET | Refills: 0 | Status: SHIPPED | OUTPATIENT
Start: 2018-04-12 | End: 2018-08-23 | Stop reason: SDUPTHER

## 2018-04-17 DIAGNOSIS — B37.31 VULVOVAGINAL CANDIDIASIS: Primary | ICD-10-CM

## 2018-04-23 RX ORDER — BUTALBITAL, ACETAMINOPHEN AND CAFFEINE 50; 325; 40 MG/1; MG/1; MG/1
1 TABLET ORAL EVERY 6 HOURS PRN
Qty: 40 TABLET | Refills: 0 | Status: SHIPPED | OUTPATIENT
Start: 2018-04-23 | End: 2018-05-07 | Stop reason: SDUPTHER

## 2018-04-25 ENCOUNTER — HOSPITAL ENCOUNTER (OUTPATIENT)
Dept: WOMENS IMAGING | Age: 43
Discharge: HOME | End: 2018-04-25
Payer: COMMERCIAL

## 2018-04-25 ENCOUNTER — TELEPHONE (OUTPATIENT)
Dept: OBGYN | Age: 43
End: 2018-04-25

## 2018-04-25 ENCOUNTER — TELEPHONE (OUTPATIENT)
Dept: FAMILY MEDICINE CLINIC | Age: 43
End: 2018-04-25

## 2018-04-25 ENCOUNTER — NURSE ONLY (OUTPATIENT)
Dept: NEUROSURGERY | Age: 43
End: 2018-04-25
Payer: COMMERCIAL

## 2018-04-25 DIAGNOSIS — E53.8 B12 DEFICIENCY: Primary | ICD-10-CM

## 2018-04-25 DIAGNOSIS — E53.8 B12 DEFICIENCY: ICD-10-CM

## 2018-04-25 DIAGNOSIS — Z01.419 WOMEN'S ANNUAL ROUTINE GYNECOLOGICAL EXAMINATION: ICD-10-CM

## 2018-04-25 PROBLEM — R53.83 FATIGUE: Status: ACTIVE | Noted: 2018-04-25

## 2018-04-25 PROCEDURE — 96372 THER/PROPH/DIAG INJ SC/IM: CPT | Performed by: PSYCHIATRY & NEUROLOGY

## 2018-04-25 PROCEDURE — 77063 BREAST TOMOSYNTHESIS BI: CPT

## 2018-04-25 RX ORDER — FLUCONAZOLE 150 MG/1
150 TABLET ORAL ONCE
Qty: 1 TABLET | Refills: 0 | Status: SHIPPED | OUTPATIENT
Start: 2018-04-25 | End: 2018-04-25

## 2018-04-25 RX ADMIN — CYANOCOBALAMIN 1000 MCG: 1000 INJECTION INTRAMUSCULAR; SUBCUTANEOUS at 15:30

## 2018-04-26 RX ORDER — CYANOCOBALAMIN 1000 UG/ML
1000 INJECTION INTRAMUSCULAR; SUBCUTANEOUS ONCE
Status: COMPLETED | OUTPATIENT
Start: 2018-04-26 | End: 2018-04-25

## 2018-04-26 RX ORDER — CYANOCOBALAMIN 1000 UG/ML
1000 INJECTION INTRAMUSCULAR; SUBCUTANEOUS ONCE
Qty: 1 ML | Refills: 0 | OUTPATIENT
Start: 2018-04-26 | End: 2018-04-26 | Stop reason: CLARIF

## 2018-05-01 ENCOUNTER — TELEPHONE (OUTPATIENT)
Dept: WOMENS IMAGING | Age: 43
End: 2018-05-01

## 2018-05-07 RX ORDER — BUTALBITAL, ACETAMINOPHEN AND CAFFEINE 50; 325; 40 MG/1; MG/1; MG/1
1 TABLET ORAL EVERY 6 HOURS PRN
Qty: 40 TABLET | Refills: 0 | Status: SHIPPED | OUTPATIENT
Start: 2018-05-07 | End: 2018-05-21 | Stop reason: SDUPTHER

## 2018-05-21 ENCOUNTER — TELEPHONE (OUTPATIENT)
Dept: WOMENS IMAGING | Age: 43
End: 2018-05-21

## 2018-05-21 RX ORDER — BUTALBITAL, ACETAMINOPHEN AND CAFFEINE 50; 325; 40 MG/1; MG/1; MG/1
1 TABLET ORAL EVERY 6 HOURS PRN
Qty: 40 TABLET | Refills: 0 | Status: SHIPPED | OUTPATIENT
Start: 2018-05-21 | End: 2018-06-06 | Stop reason: SDUPTHER

## 2018-05-23 ENCOUNTER — HOSPITAL ENCOUNTER (OUTPATIENT)
Dept: WOMENS IMAGING | Age: 43
Discharge: HOME | End: 2018-05-23
Payer: COMMERCIAL

## 2018-05-23 DIAGNOSIS — N64.89 BREAST ASYMMETRY: ICD-10-CM

## 2018-05-23 PROCEDURE — 76642 ULTRASOUND BREAST LIMITED: CPT

## 2018-05-23 PROCEDURE — 77065 DX MAMMO INCL CAD UNI: CPT

## 2018-06-05 ENCOUNTER — PROCEDURE VISIT (OUTPATIENT)
Dept: NEUROSURGERY | Age: 43
End: 2018-06-05
Payer: COMMERCIAL

## 2018-06-05 VITALS
OXYGEN SATURATION: 98 % | WEIGHT: 162 LBS | BODY MASS INDEX: 25.43 KG/M2 | DIASTOLIC BLOOD PRESSURE: 90 MMHG | SYSTOLIC BLOOD PRESSURE: 142 MMHG | HEART RATE: 73 BPM | HEIGHT: 67 IN

## 2018-06-05 DIAGNOSIS — M54.81 BILATERAL OCCIPITAL NEURALGIA: Primary | ICD-10-CM

## 2018-06-05 PROCEDURE — 64405 NJX AA&/STRD GR OCPL NRV: CPT | Performed by: PSYCHIATRY & NEUROLOGY

## 2018-06-05 RX ORDER — LEVONORGESTREL AND ETHINYL ESTRADIOL 90; 20 UG/1; UG/1
1 TABLET ORAL DAILY
COMMUNITY
Start: 2018-02-07 | End: 2019-02-25 | Stop reason: SDUPTHER

## 2018-06-05 RX ORDER — RIZATRIPTAN BENZOATE 10 MG/1
10 TABLET ORAL PRN
COMMUNITY
Start: 2018-05-15 | End: 2019-03-08 | Stop reason: ALTCHOICE

## 2018-06-06 DIAGNOSIS — R51.9 INTRACTABLE HEADACHE, UNSPECIFIED CHRONICITY PATTERN, UNSPECIFIED HEADACHE TYPE: Primary | ICD-10-CM

## 2018-06-06 RX ORDER — BUTALBITAL, ACETAMINOPHEN AND CAFFEINE 50; 325; 40 MG/1; MG/1; MG/1
1 TABLET ORAL EVERY 6 HOURS PRN
Qty: 40 TABLET | Refills: 0 | Status: SHIPPED | OUTPATIENT
Start: 2018-06-06 | End: 2018-06-11 | Stop reason: SDUPTHER

## 2018-06-11 DIAGNOSIS — R51.9 INTRACTABLE HEADACHE, UNSPECIFIED CHRONICITY PATTERN, UNSPECIFIED HEADACHE TYPE: ICD-10-CM

## 2018-06-11 RX ORDER — BUTALBITAL, ACETAMINOPHEN AND CAFFEINE 50; 325; 40 MG/1; MG/1; MG/1
1 TABLET ORAL EVERY 6 HOURS PRN
Qty: 40 TABLET | Refills: 0 | Status: SHIPPED | OUTPATIENT
Start: 2018-06-11 | End: 2018-07-05 | Stop reason: SDUPTHER

## 2018-06-13 DIAGNOSIS — Z00.00 ANNUAL PHYSICAL EXAM: Primary | ICD-10-CM

## 2018-06-13 DIAGNOSIS — R51.9 NONINTRACTABLE HEADACHE, UNSPECIFIED CHRONICITY PATTERN, UNSPECIFIED HEADACHE TYPE: Primary | ICD-10-CM

## 2018-06-13 RX ORDER — GABAPENTIN 300 MG/1
CAPSULE ORAL
Qty: 120 CAPSULE | Refills: 5 | Status: SHIPPED | OUTPATIENT
Start: 2018-07-15 | End: 2019-04-23 | Stop reason: ALTCHOICE

## 2018-06-25 RX ORDER — TIZANIDINE HYDROCHLORIDE 4 MG/1
CAPSULE, GELATIN COATED ORAL
Qty: 180 CAPSULE | Refills: 0 | Status: SHIPPED | OUTPATIENT
Start: 2018-06-25 | End: 2018-10-01 | Stop reason: SDUPTHER

## 2018-07-05 DIAGNOSIS — G43.709 CHRONIC MIGRAINE WITHOUT AURA WITHOUT STATUS MIGRAINOSUS, NOT INTRACTABLE: Primary | ICD-10-CM

## 2018-07-05 DIAGNOSIS — R51.9 INTRACTABLE HEADACHE, UNSPECIFIED CHRONICITY PATTERN, UNSPECIFIED HEADACHE TYPE: ICD-10-CM

## 2018-07-05 NOTE — TELEPHONE ENCOUNTER
From: Blanca Macon  Sent: 7/4/2018 1:40 PM CDT  Subject: Medication Renewal Request    Blanca Macon would like a refill of the following medications:     butalbital-acetaminophen-caffeine (FIORICET, ESGIC) -40 MG per tablet Tracy Flavors, DO]    Preferred pharmacy: [place] [address], [address]    Comment:

## 2018-07-06 RX ORDER — BUTALBITAL, ACETAMINOPHEN AND CAFFEINE 50; 325; 40 MG/1; MG/1; MG/1
1 TABLET ORAL EVERY 6 HOURS PRN
Qty: 40 TABLET | Refills: 0 | Status: SHIPPED | OUTPATIENT
Start: 2018-07-06 | End: 2018-07-17 | Stop reason: SDUPTHER

## 2018-07-17 DIAGNOSIS — R51.9 INTRACTABLE HEADACHE, UNSPECIFIED CHRONICITY PATTERN, UNSPECIFIED HEADACHE TYPE: ICD-10-CM

## 2018-07-17 RX ORDER — BUTALBITAL, ACETAMINOPHEN AND CAFFEINE 50; 325; 40 MG/1; MG/1; MG/1
1 TABLET ORAL EVERY 6 HOURS PRN
Qty: 40 TABLET | Refills: 3 | Status: SHIPPED | OUTPATIENT
Start: 2018-07-17 | End: 2018-09-06 | Stop reason: SDUPTHER

## 2018-07-17 RX ORDER — MONTELUKAST SODIUM 10 MG/1
10 TABLET ORAL NIGHTLY
Qty: 90 TABLET | Refills: 0 | Status: SHIPPED | OUTPATIENT
Start: 2018-07-17 | End: 2018-08-23 | Stop reason: SINTOL

## 2018-07-17 NOTE — TELEPHONE ENCOUNTER
Yusra Child called requesting a refill of the below medication which has been pended for you:     Requested Prescriptions     Pending Prescriptions Disp Refills    montelukast (SINGULAIR) 10 MG tablet 90 tablet 0     Sig: Take 1 tablet by mouth nightly       Last Appointment Date: 1/10/2018  Next Appointment Date: 8/3/2018    Allergies   Allergen Reactions    Penicillins Other (See Comments)     Skin peeling off in insides of hands

## 2018-07-17 NOTE — TELEPHONE ENCOUNTER
Requested Prescriptions     Pending Prescriptions Disp Refills    butalbital-acetaminophen-caffeine (FIORICET, ESGIC) -40 MG per tablet 40 tablet 0     Sig: Take 1 tablet by mouth every 6 hours as needed for Headaches     Last filled: 7/6/18  Last Office Visit: 6/5/18  Next Office Visit: 7/26/18  Last Annemarie Lux: 5/11/18      8

## 2018-07-20 PROCEDURE — 99283 EMERGENCY DEPT VISIT LOW MDM: CPT

## 2018-07-21 ENCOUNTER — HOSPITAL ENCOUNTER (EMERGENCY)
Facility: HOSPITAL | Age: 43
Discharge: HOME | End: 2018-07-21
Attending: EMERGENCY MEDICINE | Admitting: EMERGENCY MEDICINE

## 2018-07-21 VITALS
OXYGEN SATURATION: 99 % | WEIGHT: 162 LBS | BODY MASS INDEX: 25.43 KG/M2 | DIASTOLIC BLOOD PRESSURE: 87 MMHG | TEMPERATURE: 98.2 F | RESPIRATION RATE: 16 BRPM | SYSTOLIC BLOOD PRESSURE: 137 MMHG | HEART RATE: 87 BPM | HEIGHT: 67 IN

## 2018-07-21 DIAGNOSIS — R45.851 SUICIDAL IDEATION: ICD-10-CM

## 2018-07-21 DIAGNOSIS — R51.9 NONINTRACTABLE EPISODIC HEADACHE, UNSPECIFIED HEADACHE TYPE: Primary | ICD-10-CM

## 2018-07-21 LAB
ALBUMIN SERPL-MCNC: 4.6 G/DL (ref 3.5–5)
ALBUMIN/GLOB SERPL: 1.3 G/DL (ref 1.1–2.5)
ALP SERPL-CCNC: 70 U/L (ref 24–120)
ALT SERPL W P-5'-P-CCNC: 21 U/L (ref 0–54)
AMPHET+METHAMPHET UR QL: NEGATIVE
ANION GAP SERPL CALCULATED.3IONS-SCNC: 15 MMOL/L (ref 4–13)
APAP SERPL-MCNC: <10 MCG/ML (ref 10–30)
AST SERPL-CCNC: 24 U/L (ref 7–45)
B-HCG UR QL: NEGATIVE
BARBITURATES UR QL SCN: POSITIVE
BASOPHILS # BLD AUTO: 0.04 10*3/MM3 (ref 0–0.2)
BASOPHILS NFR BLD AUTO: 0.7 % (ref 0–2)
BENZODIAZ UR QL SCN: NEGATIVE
BILIRUB SERPL-MCNC: 0.5 MG/DL (ref 0.1–1)
BUN BLD-MCNC: 14 MG/DL (ref 5–21)
BUN/CREAT SERPL: 12.6 (ref 7–25)
CALCIUM SPEC-SCNC: 9.6 MG/DL (ref 8.4–10.4)
CANNABINOIDS SERPL QL: NEGATIVE
CHLORIDE SERPL-SCNC: 104 MMOL/L (ref 98–110)
CO2 SERPL-SCNC: 25 MMOL/L (ref 24–31)
COCAINE UR QL: NEGATIVE
CREAT BLD-MCNC: 1.11 MG/DL (ref 0.5–1.4)
DEPRECATED RDW RBC AUTO: 44.9 FL (ref 40–54)
EOSINOPHIL # BLD AUTO: 0.14 10*3/MM3 (ref 0–0.7)
EOSINOPHIL NFR BLD AUTO: 2.4 % (ref 0–4)
ERYTHROCYTE [DISTWIDTH] IN BLOOD BY AUTOMATED COUNT: 13.7 % (ref 12–15)
ETHANOL UR QL: <0.01 %
GFR SERPL CREATININE-BSD FRML MDRD: 54 ML/MIN/1.73
GLOBULIN UR ELPH-MCNC: 3.5 GM/DL
GLUCOSE BLD-MCNC: 93 MG/DL (ref 70–100)
HCT VFR BLD AUTO: 37.6 % (ref 37–47)
HGB BLD-MCNC: 12.6 G/DL (ref 12–16)
HOLD SPECIMEN: NORMAL
HOLD SPECIMEN: NORMAL
IMM GRANULOCYTES # BLD: 0.02 10*3/MM3 (ref 0–0.03)
IMM GRANULOCYTES NFR BLD: 0.3 % (ref 0–5)
LYMPHOCYTES # BLD AUTO: 2.15 10*3/MM3 (ref 0.72–4.86)
LYMPHOCYTES NFR BLD AUTO: 36.3 % (ref 15–45)
MCH RBC QN AUTO: 29.9 PG (ref 28–32)
MCHC RBC AUTO-ENTMCNC: 33.5 G/DL (ref 33–36)
MCV RBC AUTO: 89.3 FL (ref 82–98)
METHADONE UR QL SCN: NEGATIVE
MONOCYTES # BLD AUTO: 0.5 10*3/MM3 (ref 0.19–1.3)
MONOCYTES NFR BLD AUTO: 8.4 % (ref 4–12)
NEUTROPHILS # BLD AUTO: 3.07 10*3/MM3 (ref 1.87–8.4)
NEUTROPHILS NFR BLD AUTO: 51.9 % (ref 39–78)
NRBC BLD MANUAL-RTO: 0 /100 WBC (ref 0–0)
OPIATES UR QL: NEGATIVE
PCP UR QL SCN: NEGATIVE
PLATELET # BLD AUTO: 391 10*3/MM3 (ref 130–400)
PMV BLD AUTO: 10 FL (ref 6–12)
POTASSIUM BLD-SCNC: 4 MMOL/L (ref 3.5–5.3)
PROT SERPL-MCNC: 8.1 G/DL (ref 6.3–8.7)
RBC # BLD AUTO: 4.21 10*6/MM3 (ref 4.2–5.4)
SALICYLATES SERPL-MCNC: <1 MG/DL (ref 15–30)
SODIUM BLD-SCNC: 144 MMOL/L (ref 135–145)
WBC NRBC COR # BLD: 5.92 10*3/MM3 (ref 4.8–10.8)
WHOLE BLOOD HOLD SPECIMEN: NORMAL
WHOLE BLOOD HOLD SPECIMEN: NORMAL

## 2018-07-21 PROCEDURE — 85025 COMPLETE CBC W/AUTO DIFF WBC: CPT | Performed by: EMERGENCY MEDICINE

## 2018-07-21 PROCEDURE — 96375 TX/PRO/DX INJ NEW DRUG ADDON: CPT

## 2018-07-21 PROCEDURE — 80307 DRUG TEST PRSMV CHEM ANLYZR: CPT | Performed by: EMERGENCY MEDICINE

## 2018-07-21 PROCEDURE — 96374 THER/PROPH/DIAG INJ IV PUSH: CPT

## 2018-07-21 PROCEDURE — 81025 URINE PREGNANCY TEST: CPT | Performed by: EMERGENCY MEDICINE

## 2018-07-21 PROCEDURE — 96361 HYDRATE IV INFUSION ADD-ON: CPT

## 2018-07-21 PROCEDURE — 25010000002 METOCLOPRAMIDE PER 10 MG: Performed by: EMERGENCY MEDICINE

## 2018-07-21 PROCEDURE — 80053 COMPREHEN METABOLIC PANEL: CPT | Performed by: EMERGENCY MEDICINE

## 2018-07-21 PROCEDURE — 25010000002 DIPHENHYDRAMINE PER 50 MG: Performed by: EMERGENCY MEDICINE

## 2018-07-21 RX ORDER — DIPHENHYDRAMINE HYDROCHLORIDE 50 MG/ML
25 INJECTION INTRAMUSCULAR; INTRAVENOUS ONCE
Status: COMPLETED | OUTPATIENT
Start: 2018-07-21 | End: 2018-07-21

## 2018-07-21 RX ORDER — ONDANSETRON 4 MG/1
4 TABLET, ORALLY DISINTEGRATING ORAL ONCE
Status: COMPLETED | OUTPATIENT
Start: 2018-07-21 | End: 2018-07-21

## 2018-07-21 RX ORDER — METOCLOPRAMIDE HYDROCHLORIDE 5 MG/ML
10 INJECTION INTRAMUSCULAR; INTRAVENOUS ONCE
Status: COMPLETED | OUTPATIENT
Start: 2018-07-21 | End: 2018-07-21

## 2018-07-21 RX ORDER — SODIUM CHLORIDE 0.9 % (FLUSH) 0.9 %
10 SYRINGE (ML) INJECTION AS NEEDED
Status: DISCONTINUED | OUTPATIENT
Start: 2018-07-21 | End: 2018-07-21 | Stop reason: HOSPADM

## 2018-07-21 RX ADMIN — METOCLOPRAMIDE 10 MG: 5 INJECTION, SOLUTION INTRAMUSCULAR; INTRAVENOUS at 04:20

## 2018-07-21 RX ADMIN — DIPHENHYDRAMINE HYDROCHLORIDE 25 MG: 50 INJECTION, SOLUTION INTRAMUSCULAR; INTRAVENOUS at 04:22

## 2018-07-21 RX ADMIN — ONDANSETRON 4 MG: 4 TABLET, ORALLY DISINTEGRATING ORAL at 08:08

## 2018-07-21 RX ADMIN — SODIUM CHLORIDE 1000 ML: 9 INJECTION, SOLUTION INTRAVENOUS at 04:19

## 2018-08-23 ENCOUNTER — OFFICE VISIT (OUTPATIENT)
Dept: FAMILY MEDICINE CLINIC | Age: 43
End: 2018-08-23
Payer: COMMERCIAL

## 2018-08-23 VITALS
BODY MASS INDEX: 25.29 KG/M2 | WEIGHT: 161.13 LBS | SYSTOLIC BLOOD PRESSURE: 120 MMHG | DIASTOLIC BLOOD PRESSURE: 86 MMHG | HEART RATE: 70 BPM | HEIGHT: 67 IN | OXYGEN SATURATION: 100 % | TEMPERATURE: 98.7 F

## 2018-08-23 DIAGNOSIS — F51.01 PRIMARY INSOMNIA: Primary | ICD-10-CM

## 2018-08-23 DIAGNOSIS — E53.8 VITAMIN B12 DEFICIENCY: ICD-10-CM

## 2018-08-23 DIAGNOSIS — M54.81 CERVICO-OCCIPITAL NEURALGIA: ICD-10-CM

## 2018-08-23 DIAGNOSIS — F41.9 ANXIETY: ICD-10-CM

## 2018-08-23 DIAGNOSIS — I10 ESSENTIAL (PRIMARY) HYPERTENSION: ICD-10-CM

## 2018-08-23 LAB
AMPHETAMINE SCREEN, URINE: NORMAL
BARBITURATE SCREEN, URINE: NORMAL
BENZODIAZEPINE SCREEN, URINE: POSITIVE
BUPRENORPHINE URINE: NORMAL
COCAINE METABOLITE SCREEN URINE: NORMAL
GABAPENTIN SCREEN, URINE: NORMAL
METHADONE SCREEN, URINE: NORMAL
METHAMPHETAMINE, URINE: NORMAL
OPIATE SCREEN URINE: NORMAL
OXYCODONE SCREEN URINE: NORMAL
PHENCYCLIDINE SCREEN URINE: NORMAL
PROPOXYPHENE SCREEN, URINE: NORMAL
THC SCREEN, URINE: NORMAL
TRICYCLIC ANTIDEPRESSANTS, UR: NORMAL

## 2018-08-23 PROCEDURE — 96372 THER/PROPH/DIAG INJ SC/IM: CPT | Performed by: CLINICAL NURSE SPECIALIST

## 2018-08-23 PROCEDURE — 80305 DRUG TEST PRSMV DIR OPT OBS: CPT | Performed by: CLINICAL NURSE SPECIALIST

## 2018-08-23 PROCEDURE — 99214 OFFICE O/P EST MOD 30 MIN: CPT | Performed by: CLINICAL NURSE SPECIALIST

## 2018-08-23 RX ORDER — CYANOCOBALAMIN 1000 UG/ML
1000 INJECTION INTRAMUSCULAR; SUBCUTANEOUS ONCE
Status: COMPLETED | OUTPATIENT
Start: 2018-08-23 | End: 2018-08-23

## 2018-08-23 RX ORDER — ESZOPICLONE 3 MG/1
3 TABLET, FILM COATED ORAL NIGHTLY
Qty: 30 TABLET | Refills: 2 | Status: SHIPPED | OUTPATIENT
Start: 2018-08-23 | End: 2018-11-21 | Stop reason: SDUPTHER

## 2018-08-23 RX ORDER — BUSPIRONE HYDROCHLORIDE 10 MG/1
10 TABLET ORAL 3 TIMES DAILY
Qty: 90 TABLET | Refills: 1 | Status: SHIPPED | OUTPATIENT
Start: 2018-08-23 | End: 2018-11-13 | Stop reason: SDUPTHER

## 2018-08-23 RX ORDER — ONDANSETRON 4 MG/1
TABLET, FILM COATED ORAL
Qty: 20 TABLET | Refills: 0 | Status: SHIPPED | OUTPATIENT
Start: 2018-08-23 | End: 2020-02-06 | Stop reason: SDUPTHER

## 2018-08-23 RX ORDER — DULOXETIN HYDROCHLORIDE 60 MG/1
60 CAPSULE, DELAYED RELEASE ORAL DAILY
Qty: 30 CAPSULE | Refills: 1 | Status: SHIPPED | OUTPATIENT
Start: 2018-08-23 | End: 2018-11-06 | Stop reason: SDUPTHER

## 2018-08-23 RX ORDER — TRAZODONE HYDROCHLORIDE 50 MG/1
TABLET ORAL
Qty: 90 TABLET | Refills: 1 | Status: SHIPPED | OUTPATIENT
Start: 2018-08-23 | End: 2019-03-07 | Stop reason: SDUPTHER

## 2018-08-23 RX ORDER — METOPROLOL SUCCINATE 25 MG/1
25 TABLET, EXTENDED RELEASE ORAL DAILY
Qty: 90 TABLET | Refills: 1 | Status: SHIPPED | OUTPATIENT
Start: 2018-08-23 | End: 2019-03-07 | Stop reason: SDUPTHER

## 2018-08-23 RX ORDER — ESZOPICLONE 3 MG/1
1 TABLET, FILM COATED ORAL
COMMUNITY
Start: 2018-07-22 | End: 2018-08-23 | Stop reason: SDUPTHER

## 2018-08-23 RX ADMIN — CYANOCOBALAMIN 1000 MCG: 1000 INJECTION INTRAMUSCULAR; SUBCUTANEOUS at 16:14

## 2018-08-23 NOTE — PROGRESS NOTES
needed for Migraine May repeat x 1 after two hours, max 20 mg daily. 18 tablet 5     No current facility-administered medications for this visit. Allergies   Allergen Reactions    Penicillins Other (See Comments)     Skin peeling off in insides of hands       Review of Systems   Constitutional: Positive for fatigue. Negative for appetite change, chills, diaphoresis and fever. HENT: Negative for congestion, ear pain, facial swelling, hearing loss, postnasal drip, sinus pressure, sore throat and trouble swallowing. Eyes: Negative for visual disturbance. Respiratory: Negative for cough, chest tightness, shortness of breath and wheezing. Cardiovascular: Negative for chest pain and palpitations. Gastrointestinal: Negative for abdominal pain, constipation and diarrhea. Genitourinary: Negative for difficulty urinating, dysuria, flank pain, frequency, hematuria and urgency. Musculoskeletal: Positive for neck pain. Negative for arthralgias, back pain, joint swelling and neck stiffness. Skin: Negative for color change and rash. Neurological: Positive for numbness and headaches. Negative for dizziness, syncope, weakness and light-headedness. Hematological: Negative for adenopathy. Does not bruise/bleed easily. Psychiatric/Behavioral: Positive for dysphoric mood and sleep disturbance. Negative for confusion and suicidal ideas. The patient is nervous/anxious. OBJECTIVE:  /86   Pulse 70   Temp 98.7 °F (37.1 °C) (Temporal)   Ht 5' 7\" (1.702 m)   Wt 161 lb 2 oz (73.1 kg)   SpO2 100%   BMI 25.24 kg/m²    Physical Exam   Constitutional: She is oriented to person, place, and time. She appears well-developed and well-nourished. HENT:   Head: Normocephalic and atraumatic. Mouth/Throat: Oropharynx is clear and moist.   Eyes: Pupils are equal, round, and reactive to light. Conjunctivae are normal. Right eye exhibits no discharge. Left eye exhibits no discharge. Neck: Neck supple.  No tracheal deviation present. No thyromegaly present. Cardiovascular: Normal rate and regular rhythm. No murmur heard. Pulmonary/Chest: Effort normal and breath sounds normal. No respiratory distress. She has no wheezes. She has no rales. Musculoskeletal: Normal range of motion. She exhibits no deformity. Lymphadenopathy:     She has no cervical adenopathy. Neurological: She is alert and oriented to person, place, and time. No cranial nerve deficit. Skin: Skin is warm and dry. No rash noted. No erythema. Psychiatric: Thought content normal. Her affect is labile. Vitals reviewed. ASSESSMENT/PLAN:  1. Essential (primary) hypertension  Controlled  Continue same  - metoprolol succinate (TOPROL XL) 25 MG extended release tablet; Take 1 tablet by mouth daily  Dispense: 90 tablet; Refill: 1    2. Primary insomnia  Currently controlled on current regimen  Continue same  UDS updated today and appropriate  - eszopiclone (LUNESTA) 3 MG TABS; Take 1 tablet by mouth nightly for 90 days. .  Dispense: 30 tablet; Refill: 2  - traZODone (DESYREL) 50 MG tablet; Take 1 to 2 tablets by mouth at bedtime as needed  Dispense: 90 tablet; Refill: 1  - POCT Rapid Drug Screen    3. Anxiety  Add buspar scheduled vs prn, discussed today  - DULoxetine (CYMBALTA) 60 MG extended release capsule; Take 1 capsule by mouth daily  Dispense: 30 capsule; Refill: 1  - busPIRone (BUSPAR) 10 MG tablet; Take 1 tablet by mouth 3 times daily  Dispense: 90 tablet; Refill: 1    4. Vitamin B12 deficiency  If she feels no improvement from injection, most likely will not do any further  - cyanocobalamin injection 1,000 mcg; Inject 1 mL into the muscle once    5. Cervico-occipital neuralgia  Now followed by neurology at [place]  She will be seeing pain mgt and holistic provider there soon for her pain         Return in about 3 months (around 11/23/2018).

## 2018-08-24 ASSESSMENT — ENCOUNTER SYMPTOMS
CHEST TIGHTNESS: 0
WHEEZING: 0
BACK PAIN: 0
CONSTIPATION: 0
TROUBLE SWALLOWING: 0
ABDOMINAL PAIN: 0
SHORTNESS OF BREATH: 0
COLOR CHANGE: 0
COUGH: 0
FACIAL SWELLING: 0
SORE THROAT: 0
SINUS PRESSURE: 0
DIARRHEA: 0

## 2018-09-06 DIAGNOSIS — R51.9 INTRACTABLE HEADACHE, UNSPECIFIED CHRONICITY PATTERN, UNSPECIFIED HEADACHE TYPE: ICD-10-CM

## 2018-09-07 RX ORDER — BUTALBITAL, ACETAMINOPHEN AND CAFFEINE 50; 325; 40 MG/1; MG/1; MG/1
1 TABLET ORAL EVERY 6 HOURS PRN
Qty: 40 TABLET | Refills: 3 | Status: SHIPPED | OUTPATIENT
Start: 2018-09-07 | End: 2018-10-31 | Stop reason: SDUPTHER

## 2018-10-01 RX ORDER — TIZANIDINE HYDROCHLORIDE 4 MG/1
CAPSULE, GELATIN COATED ORAL
Qty: 180 CAPSULE | Refills: 0 | Status: SHIPPED | OUTPATIENT
Start: 2018-10-01 | End: 2018-12-20 | Stop reason: SDUPTHER

## 2018-10-31 DIAGNOSIS — R51.9 INTRACTABLE HEADACHE, UNSPECIFIED CHRONICITY PATTERN, UNSPECIFIED HEADACHE TYPE: ICD-10-CM

## 2018-10-31 NOTE — TELEPHONE ENCOUNTER
Requested Prescriptions     Pending Prescriptions Disp Refills    butalbital-acetaminophen-caffeine (FIORICET, ESGIC) -40 MG per tablet 40 tablet 3     Sig: Take 1 tablet by mouth every 6 hours as needed for Headaches     Last OV: 6/5/18  Next OV: 11/28/18  Last Fill: 09/07/18  Jory Bahena: needs one

## 2018-11-01 RX ORDER — BUTALBITAL, ACETAMINOPHEN AND CAFFEINE 50; 325; 40 MG/1; MG/1; MG/1
1 TABLET ORAL EVERY 6 HOURS PRN
Qty: 40 TABLET | Refills: 3 | Status: SHIPPED | OUTPATIENT
Start: 2018-11-01 | End: 2019-04-23 | Stop reason: ALTCHOICE

## 2018-11-06 DIAGNOSIS — F41.9 ANXIETY: ICD-10-CM

## 2018-11-06 RX ORDER — DULOXETIN HYDROCHLORIDE 60 MG/1
60 CAPSULE, DELAYED RELEASE ORAL DAILY
Qty: 90 CAPSULE | Refills: 0 | Status: SHIPPED | OUTPATIENT
Start: 2018-11-06 | End: 2019-03-07 | Stop reason: SDUPTHER

## 2018-11-13 DIAGNOSIS — F41.9 ANXIETY: ICD-10-CM

## 2018-11-13 RX ORDER — BUSPIRONE HYDROCHLORIDE 10 MG/1
10 TABLET ORAL 3 TIMES DAILY
Qty: 90 TABLET | Refills: 1 | Status: SHIPPED | OUTPATIENT
Start: 2018-11-13 | End: 2019-02-12 | Stop reason: SDUPTHER

## 2018-11-21 DIAGNOSIS — F51.01 PRIMARY INSOMNIA: ICD-10-CM

## 2018-11-21 RX ORDER — ESZOPICLONE 3 MG/1
TABLET, FILM COATED ORAL
Qty: 30 TABLET | Refills: 2 | Status: SHIPPED | OUTPATIENT
Start: 2018-11-21 | End: 2019-02-21 | Stop reason: SDUPTHER

## 2018-12-20 RX ORDER — TIZANIDINE HYDROCHLORIDE 4 MG/1
CAPSULE, GELATIN COATED ORAL
Qty: 180 CAPSULE | Refills: 0 | Status: SHIPPED | OUTPATIENT
Start: 2018-12-20 | End: 2019-03-07 | Stop reason: SDUPTHER

## 2018-12-26 RX ORDER — ONDANSETRON 4 MG/1
4 TABLET, ORALLY DISINTEGRATING ORAL EVERY 8 HOURS PRN
Qty: 20 TABLET | Refills: 0 | Status: SHIPPED | OUTPATIENT
Start: 2018-12-26 | End: 2019-03-08 | Stop reason: ALTCHOICE

## 2019-01-31 DIAGNOSIS — F41.9 ANXIETY: ICD-10-CM

## 2019-01-31 RX ORDER — DULOXETIN HYDROCHLORIDE 60 MG/1
60 CAPSULE, DELAYED RELEASE ORAL DAILY
Qty: 90 CAPSULE | Refills: 0 | Status: CANCELLED | OUTPATIENT
Start: 2019-01-31 | End: 2019-05-01

## 2019-02-12 DIAGNOSIS — F41.9 ANXIETY: ICD-10-CM

## 2019-02-12 RX ORDER — BUSPIRONE HYDROCHLORIDE 10 MG/1
TABLET ORAL
Qty: 90 TABLET | Refills: 0 | Status: SHIPPED | OUTPATIENT
Start: 2019-02-12 | End: 2019-03-07 | Stop reason: SDUPTHER

## 2019-02-21 ENCOUNTER — PATIENT MESSAGE (OUTPATIENT)
Dept: FAMILY MEDICINE CLINIC | Age: 44
End: 2019-02-21

## 2019-02-21 DIAGNOSIS — F51.01 PRIMARY INSOMNIA: ICD-10-CM

## 2019-02-21 RX ORDER — ESZOPICLONE 3 MG/1
TABLET, FILM COATED ORAL
Qty: 30 TABLET | Refills: 2 | Status: SHIPPED | OUTPATIENT
Start: 2019-02-21 | End: 2019-05-21 | Stop reason: SDUPTHER

## 2019-02-25 RX ORDER — LEVONORGESTREL AND ETHINYL ESTRADIOL 0.15-0.03
1 KIT ORAL DAILY
Qty: 91 TABLET | Refills: 0 | Status: SHIPPED | OUTPATIENT
Start: 2019-02-25 | End: 2019-04-10 | Stop reason: SDUPTHER

## 2019-02-25 RX ORDER — LEVONORGESTREL AND ETHINYL ESTRADIOL 90; 20 UG/1; UG/1
1 TABLET ORAL DAILY
Qty: 28 TABLET | Refills: 2 | Status: SHIPPED | OUTPATIENT
Start: 2019-02-25 | End: 2019-03-08 | Stop reason: ALTCHOICE

## 2019-03-07 ENCOUNTER — OFFICE VISIT (OUTPATIENT)
Dept: FAMILY MEDICINE CLINIC | Age: 44
End: 2019-03-07
Payer: COMMERCIAL

## 2019-03-07 VITALS
WEIGHT: 176.2 LBS | DIASTOLIC BLOOD PRESSURE: 88 MMHG | HEART RATE: 82 BPM | BODY MASS INDEX: 27.6 KG/M2 | TEMPERATURE: 98.5 F | OXYGEN SATURATION: 98 % | SYSTOLIC BLOOD PRESSURE: 130 MMHG

## 2019-03-07 DIAGNOSIS — I10 ESSENTIAL (PRIMARY) HYPERTENSION: ICD-10-CM

## 2019-03-07 DIAGNOSIS — M54.81 CERVICO-OCCIPITAL NEURALGIA: ICD-10-CM

## 2019-03-07 DIAGNOSIS — F41.9 ANXIETY: Primary | ICD-10-CM

## 2019-03-07 DIAGNOSIS — F51.01 PRIMARY INSOMNIA: ICD-10-CM

## 2019-03-07 PROCEDURE — 99214 OFFICE O/P EST MOD 30 MIN: CPT | Performed by: CLINICAL NURSE SPECIALIST

## 2019-03-07 RX ORDER — METOPROLOL SUCCINATE 50 MG/1
50 TABLET, EXTENDED RELEASE ORAL DAILY
Qty: 90 TABLET | Refills: 2 | Status: SHIPPED | OUTPATIENT
Start: 2019-03-07 | End: 2019-05-06 | Stop reason: SINTOL

## 2019-03-07 RX ORDER — TIZANIDINE HYDROCHLORIDE 4 MG/1
CAPSULE, GELATIN COATED ORAL
Qty: 180 CAPSULE | Refills: 2 | Status: SHIPPED | OUTPATIENT
Start: 2019-03-07 | End: 2020-02-06 | Stop reason: SDUPTHER

## 2019-03-07 RX ORDER — DULOXETIN HYDROCHLORIDE 60 MG/1
60 CAPSULE, DELAYED RELEASE ORAL DAILY
Qty: 90 CAPSULE | Refills: 2 | Status: SHIPPED | OUTPATIENT
Start: 2019-03-07 | End: 2019-09-09

## 2019-03-07 RX ORDER — TRAZODONE HYDROCHLORIDE 50 MG/1
TABLET ORAL
Qty: 90 TABLET | Refills: 2 | Status: SHIPPED | OUTPATIENT
Start: 2019-03-07 | End: 2019-06-18 | Stop reason: SDUPTHER

## 2019-03-07 RX ORDER — BUSPIRONE HYDROCHLORIDE 15 MG/1
TABLET ORAL
Qty: 270 TABLET | Refills: 1 | Status: SHIPPED | OUTPATIENT
Start: 2019-03-07 | End: 2019-07-11 | Stop reason: SDUPTHER

## 2019-03-08 ASSESSMENT — ENCOUNTER SYMPTOMS
EYE PAIN: 0
BACK PAIN: 0
COLOR CHANGE: 0
CONSTIPATION: 0
SHORTNESS OF BREATH: 0
EYE DISCHARGE: 0
SORE THROAT: 0
SINUS PRESSURE: 0
FACIAL SWELLING: 0
ABDOMINAL PAIN: 0
DIARRHEA: 0
COUGH: 0
TROUBLE SWALLOWING: 0
WHEEZING: 0
EYE REDNESS: 0
CHEST TIGHTNESS: 0

## 2019-03-20 ENCOUNTER — NURSE ONLY (OUTPATIENT)
Dept: FAMILY MEDICINE CLINIC | Age: 44
End: 2019-03-20
Payer: COMMERCIAL

## 2019-03-20 DIAGNOSIS — E53.8 VITAMIN B12 DEFICIENCY: Primary | ICD-10-CM

## 2019-03-20 PROCEDURE — 96372 THER/PROPH/DIAG INJ SC/IM: CPT | Performed by: CLINICAL NURSE SPECIALIST

## 2019-03-20 RX ORDER — CYANOCOBALAMIN 1000 UG/ML
1000 INJECTION INTRAMUSCULAR; SUBCUTANEOUS ONCE
Status: COMPLETED | OUTPATIENT
Start: 2019-03-20 | End: 2019-03-20

## 2019-03-20 RX ADMIN — CYANOCOBALAMIN 1000 MCG: 1000 INJECTION INTRAMUSCULAR; SUBCUTANEOUS at 15:51

## 2019-04-10 ENCOUNTER — OFFICE VISIT (OUTPATIENT)
Dept: OBGYN | Age: 44
End: 2019-04-10
Payer: COMMERCIAL

## 2019-04-10 VITALS
BODY MASS INDEX: 27.62 KG/M2 | DIASTOLIC BLOOD PRESSURE: 88 MMHG | SYSTOLIC BLOOD PRESSURE: 134 MMHG | WEIGHT: 176 LBS | HEIGHT: 67 IN

## 2019-04-10 DIAGNOSIS — Z12.4 CERVICAL CANCER SCREENING: ICD-10-CM

## 2019-04-10 DIAGNOSIS — Z01.419 WOMEN'S ANNUAL ROUTINE GYNECOLOGICAL EXAMINATION: Primary | ICD-10-CM

## 2019-04-10 DIAGNOSIS — Z12.31 ENCOUNTER FOR SCREENING MAMMOGRAM FOR BREAST CANCER: ICD-10-CM

## 2019-04-10 DIAGNOSIS — G43.709 CHRONIC MIGRAINE WITHOUT AURA WITHOUT STATUS MIGRAINOSUS, NOT INTRACTABLE: ICD-10-CM

## 2019-04-10 DIAGNOSIS — Z30.41 ENCOUNTER FOR BIRTH CONTROL PILLS MAINTENANCE: ICD-10-CM

## 2019-04-10 PROCEDURE — 99396 PREV VISIT EST AGE 40-64: CPT | Performed by: OBSTETRICS & GYNECOLOGY

## 2019-04-10 RX ORDER — LEVONORGESTREL AND ETHINYL ESTRADIOL 0.15-0.03
1 KIT ORAL DAILY
Qty: 91 TABLET | Refills: 3 | Status: SHIPPED | OUTPATIENT
Start: 2019-04-10 | End: 2019-09-09

## 2019-04-10 ASSESSMENT — ENCOUNTER SYMPTOMS
ALLERGIC/IMMUNOLOGIC NEGATIVE: 1
RESPIRATORY NEGATIVE: 1
GASTROINTESTINAL NEGATIVE: 1
EYES NEGATIVE: 1

## 2019-04-10 NOTE — PATIENT INSTRUCTIONS
Patient Education        Breast Self-Exam: Care Instructions  Your Care Instructions    A breast self-exam is when you check your breasts for lumps or changes. This regular exam helps you learn how your breasts normally look and feel. Most breast problems or changes are not because of cancer. Breast self-exam is not a substitute for a mammogram. Having regular breast exams by your doctor and regular mammograms improve your chances of finding any problems with your breasts. Some women set a time each month to do a step-by-step breast self-exam. Other women like a less formal system. They might look at their breasts as they brush their teeth, or feel their breasts once in a while in the shower. If you notice a change in your breast, tell your doctor. Follow-up care is a key part of your treatment and safety. Be sure to make and go to all appointments, and call your doctor if you are having problems. It's also a good idea to know your test results and keep a list of the medicines you take. How do you do a breast self-exam?  · The best time to examine your breasts is usually one week after your menstrual period begins. Your breasts should not be tender then. If you do not have periods, you might do your exam on a day of the month that is easy to remember. · To examine your breasts:  ? Remove all your clothes above the waist and lie down. When you are lying down, your breast tissue spreads evenly over your chest wall, which makes it easier to feel all your breast tissue. ? Use the pads--not the fingertips--of the 3 middle fingers of your left hand to check your right breast. Move your fingers slowly in small coin-sized circles that overlap. ? Use three levels of pressure to feel of all your breast tissue. Use light pressure to feel the tissue close to the skin surface. Use medium pressure to feel a little deeper. Use firm pressure to feel your tissue close to your breastbone and ribs.  Use each pressure level to

## 2019-04-10 NOTE — PROGRESS NOTES
Juliann Walton is a 41 y.o.  who presents today for pap smear and breast exam.     No complaints, pt needs refill on OCP. Pt has chronic migraines. Pt has been on OCP since 2001. Review of Systems   Constitutional: Negative. HENT: Negative. Eyes: Negative. Respiratory: Negative. Cardiovascular: Negative. Gastrointestinal: Negative. Endocrine: Negative. Genitourinary: Negative for dyspareunia, frequency, menstrual problem, pelvic pain, urgency and vaginal discharge. Musculoskeletal: Negative. Skin: Negative. Allergic/Immunologic: Negative. Neurological: Negative. Hematological: Negative. Psychiatric/Behavioral: Negative.         Past Medical History:   Diagnosis Date    Blurred vision     due to BP problems    Breast cyst, right     Chronic sinusitis     GERD (gastroesophageal reflux disease)     Headache(784.0)     migraines    Hypertension     MVP (mitral valve prolapse)     Neurocardiogenic syncope     Syncope, cardiogenic     Varicose veins     Wears glasses        Past Surgical History:   Procedure Laterality Date    BREAST BIOPSY  12/2/2011    US guided right, benign fibroadenoma    TONSILLECTOMY AND ADENOIDECTOMY         Family History   Problem Relation Age of Onset    Other Mother         hypoglycemia    Heart Failure Mother     Heart Disease Mother     Cancer Paternal Grandmother         Hodgkin's    Breast Cancer Paternal Grandmother        Social History     Socioeconomic History    Marital status:      Spouse name: Not on file    Number of children: Not on file    Years of education: Not on file    Highest education level: Not on file   Occupational History    Not on file   Social Needs    Financial resource strain: Not on file    Food insecurity:     Worry: Not on file     Inability: Not on file    Transportation needs:     Medical: Not on file     Non-medical: Not on file   Tobacco Use    Smoking status: Never Smoker    Smokeless tobacco: Never Used   Substance and Sexual Activity    Alcohol use: Yes     Comment: rare    Drug use: No    Sexual activity: Yes     Partners: Male   Lifestyle    Physical activity:     Days per week: Not on file     Minutes per session: Not on file    Stress: Not on file   Relationships    Social connections:     Talks on phone: Not on file     Gets together: Not on file     Attends Voodoo service: Not on file     Active member of club or organization: Not on file     Attends meetings of clubs or organizations: Not on file     Relationship status: Not on file    Intimate partner violence:     Fear of current or ex partner: Not on file     Emotionally abused: Not on file     Physically abused: Not on file     Forced sexual activity: Not on file   Other Topics Concern    Not on file   Social History Narrative    Not on file         Current Outpatient Medications:     DANDELION PO, Take 1 tablet by mouth, Disp: , Rfl:     levonorgestrel-ethinyl estradiol (INTROVALE) 0.15-0.03 MG per tablet, Take 1 tablet by mouth daily, Disp: 91 tablet, Rfl: 3    DULoxetine (CYMBALTA) 60 MG extended release capsule, Take 1 capsule by mouth daily, Disp: 90 capsule, Rfl: 2    busPIRone (BUSPAR) 15 MG tablet, TAKE 1 TABLET BY MOUTH THREE TIMES DAILY, Disp: 270 tablet, Rfl: 1    metoprolol succinate (TOPROL XL) 50 MG extended release tablet, Take 1 tablet by mouth daily, Disp: 90 tablet, Rfl: 2    tiZANidine (ZANAFLEX) 4 MG capsule, TAKE 1 CAPSULE BY MOUTH EVERY 12 HOURS AS NEEDED FOR HEADACHE, Disp: 180 capsule, Rfl: 2    traZODone (DESYREL) 50 MG tablet, Take 1 to 2 tablets by mouth at bedtime as needed, Disp: 90 tablet, Rfl: 2    Erenumab-aooe (AIMOVIG 140 DOSE) 70 MG/ML SOAJ, , Disp: , Rfl:     Bupivacaine HCl (LOCAL NERVE BLOCK SYRINGE, WITHOUT EPINEPHRINE,), 30 mLs by Infiltration route every 3 months Indications: Occipital nerve block shot, Disp: , Rfl:     eszopiclone (LUNESTA) 3 MG TABS, TAKE 1 TABLET BY MOUTH EVERY NIGHT., Disp: 30 tablet, Rfl: 2    rizatriptan (MAXALT) 10 MG tablet, TAKE 1 TABLET BY MOUTH 1 TIME AS NEEDED FOR MIGRAINE. MAY REPEAT 1 TIME AFTER 2 HOURS. MAX 20 MG DAILY, Disp: 18 tablet, Rfl: 5    ondansetron (ZOFRAN) 4 MG tablet, TAKE 1 TABLET BY MOUTH EVERY 8 HOURS AS NEEDED FOR NAUSEA, Disp: 20 tablet, Rfl: 0    Alfalfa 500 MG TABS, Take by mouth, Disp: , Rfl:     Oxymetazoline HCl (AFRIN 12 HOUR NA), by Nasal route as needed, Disp: , Rfl:     butalbital-acetaminophen-caffeine (FIORICET, ESGIC) -40 MG per tablet, Take 1 tablet by mouth every 6 hours as needed for Headaches, Disp: 40 tablet, Rfl: 3    gabapentin (NEURONTIN) 300 MG capsule, 1 tab in morning and 3 tabs at night., Disp: 120 capsule, Rfl: 5    Allergies   Allergen Reactions    Penicillins Other (See Comments)     Skin peeling off in insides of hands       /88 (Site: Right Upper Arm, Position: Sitting, Cuff Size: Medium Adult)   Ht 5' 7\" (1.702 m)   Wt 176 lb (79.8 kg)   LMP  (LMP Unknown)   BMI 27.57 kg/m²   Physical Exam   Constitutional: She is oriented to person, place, and time. She appears well-developed and well-nourished. No distress. HENT:   Head: Normocephalic and atraumatic. Mouth/Throat: Oropharynx is clear and moist.   Eyes: Pupils are equal, round, and reactive to light. Conjunctivae and EOM are normal.   Neck: Normal range of motion. Neck supple. No tracheal deviation present. No thyromegaly present. Cardiovascular: Normal rate and regular rhythm. Pulmonary/Chest: Effort normal and breath sounds normal. No respiratory distress. Right breast exhibits no inverted nipple, no mass, no nipple discharge, no skin change and no tenderness. Left breast exhibits no inverted nipple, no mass, no nipple discharge, no skin change and no tenderness. Breasts are symmetrical.   Abdominal: Soft. Bowel sounds are normal. She exhibits no distension and no mass. There is no tenderness.

## 2019-04-10 NOTE — PROGRESS NOTES
Pt presents today for pap smear and breast exam.  Needs a refill on her birthcontrol.     Mammo:20  Pap smear:2018  Contraception: OCP    P:0  Ab:0  Bone density:na  Colonoscopy:na

## 2019-04-17 LAB
HPV TYPE 16: NOT DETECTED
HPV TYPE 18: NOT DETECTED
INTERPRETATION: NORMAL
OTHER HIGH RISK HPV: NOT DETECTED
SOURCE: NORMAL

## 2019-04-23 ENCOUNTER — OFFICE VISIT (OUTPATIENT)
Dept: FAMILY MEDICINE CLINIC | Age: 44
End: 2019-04-23
Payer: COMMERCIAL

## 2019-04-23 VITALS
BODY MASS INDEX: 28.19 KG/M2 | DIASTOLIC BLOOD PRESSURE: 84 MMHG | HEART RATE: 95 BPM | OXYGEN SATURATION: 98 % | WEIGHT: 180 LBS | TEMPERATURE: 97.4 F | SYSTOLIC BLOOD PRESSURE: 128 MMHG

## 2019-04-23 DIAGNOSIS — Z00.00 ENCOUNTER FOR WELL ADULT EXAM WITHOUT ABNORMAL FINDINGS: Primary | ICD-10-CM

## 2019-04-23 DIAGNOSIS — I10 ESSENTIAL (PRIMARY) HYPERTENSION: ICD-10-CM

## 2019-04-23 DIAGNOSIS — R55 SYNCOPE, CARDIOGENIC: ICD-10-CM

## 2019-04-23 PROCEDURE — 99396 PREV VISIT EST AGE 40-64: CPT | Performed by: CLINICAL NURSE SPECIALIST

## 2019-04-23 ASSESSMENT — ENCOUNTER SYMPTOMS
COLOR CHANGE: 0
TROUBLE SWALLOWING: 0
FACIAL SWELLING: 0
CONSTIPATION: 0
EYE PAIN: 0
DIARRHEA: 0
BACK PAIN: 0
SHORTNESS OF BREATH: 0
ABDOMINAL PAIN: 0
EYE REDNESS: 0
SINUS PRESSURE: 0
SORE THROAT: 0
COUGH: 0
EYE DISCHARGE: 0
WHEEZING: 0
CHEST TIGHTNESS: 0

## 2019-04-23 NOTE — PROGRESS NOTES
SUBJECTIVE:  Britni Stanley is a 41 y.o. who presents today for Follow-up (6 week follow up)      [facility] presents today for annual exam and 6 week follow up on essential hypertension with underlying neurocardiogenic syncope. At her last visit, Toprol was increased from 25 to 50mg daily to help control blood pressure. Her blood pressure has improved, but she is having excessive fatigue and near syncope at this dose. She has failed other beta blockers in the past including propanolol.     HM  She has had pap and breast exam with GYN recent  They are scheduling her mammogram    Past Medical History:   Diagnosis Date    Blurred vision     due to BP problems    Breast cyst, right     Chronic sinusitis     GERD (gastroesophageal reflux disease)     Headache(784.0)     migraines    Hypertension     MVP (mitral valve prolapse)     Neurocardiogenic syncope     Syncope, cardiogenic     Varicose veins     Wears glasses      Past Surgical History:   Procedure Laterality Date    BREAST BIOPSY  12/2/2011    US guided right, benign fibroadenoma    TONSILLECTOMY AND ADENOIDECTOMY       Family History   Problem Relation Age of Onset    Other Mother         hypoglycemia    Heart Failure Mother     Heart Disease Mother     Cancer Paternal Grandmother         Hodgkin's    Breast Cancer Paternal Grandmother      Social History     Tobacco Use    Smoking status: Never Smoker    Smokeless tobacco: Never Used   Substance Use Topics    Alcohol use: Yes     Comment: rare     Current Outpatient Medications   Medication Sig Dispense Refill    DANDELION PO Take 1 tablet by mouth      levonorgestrel-ethinyl estradiol (INTROVALE) 0.15-0.03 MG per tablet Take 1 tablet by mouth daily 91 tablet 3    DULoxetine (CYMBALTA) 60 MG extended release capsule Take 1 capsule by mouth daily 90 capsule 2    busPIRone (BUSPAR) 15 MG tablet TAKE 1 TABLET BY MOUTH THREE TIMES DAILY 270 tablet 1    metoprolol succinate (TOPROL XL) 50 MG extended release tablet Take 1 tablet by mouth daily 90 tablet 2    tiZANidine (ZANAFLEX) 4 MG capsule TAKE 1 CAPSULE BY MOUTH EVERY 12 HOURS AS NEEDED FOR HEADACHE 180 capsule 2    traZODone (DESYREL) 50 MG tablet Take 1 to 2 tablets by mouth at bedtime as needed 90 tablet 2    Erenumab-aooe (AIMOVIG 140 DOSE) 70 MG/ML SOAJ       Bupivacaine HCl (LOCAL NERVE BLOCK SYRINGE, WITHOUT EPINEPHRINE,) 30 mLs by Infiltration route every 3 months Indications: Occipital nerve block shot      rizatriptan (MAXALT) 10 MG tablet TAKE 1 TABLET BY MOUTH 1 TIME AS NEEDED FOR MIGRAINE. MAY REPEAT 1 TIME AFTER 2 HOURS. MAX 20 MG DAILY 18 tablet 5    ondansetron (ZOFRAN) 4 MG tablet TAKE 1 TABLET BY MOUTH EVERY 8 HOURS AS NEEDED FOR NAUSEA 20 tablet 0    Alfalfa 500 MG TABS Take by mouth      Oxymetazoline HCl (AFRIN 12 HOUR NA) by Nasal route as needed      butalbital-acetaminophen-caffeine (FIORICET, ESGIC) -40 MG per tablet Take 1 tablet by mouth every 6 hours as needed for Headaches 40 tablet 3    gabapentin (NEURONTIN) 300 MG capsule 1 tab in morning and 3 tabs at night. 120 capsule 5     No current facility-administered medications for this visit. Allergies   Allergen Reactions    Penicillins Other (See Comments)     Skin peeling off in insides of hands       Review of Systems   Constitutional: Positive for activity change and fatigue. Negative for appetite change, chills, diaphoresis, fever and unexpected weight change. HENT: Negative for congestion, facial swelling, postnasal drip, sinus pressure, sore throat and trouble swallowing. Eyes: Negative for pain, discharge, redness and visual disturbance. Respiratory: Negative for cough, chest tightness, shortness of breath and wheezing. Cardiovascular: Negative for chest pain and palpitations. Gastrointestinal: Negative for abdominal pain, constipation and diarrhea.    Genitourinary: Negative for dysuria, flank pain, frequency, hematuria and urgency. Musculoskeletal: Negative for arthralgias, back pain, joint swelling and neck pain. Skin: Negative for color change and rash. Neurological: Positive for dizziness and syncope (near). Negative for weakness, light-headedness and headaches. Hematological: Negative for adenopathy. Psychiatric/Behavioral: Positive for dysphoric mood. Negative for confusion and suicidal ideas. The patient is not nervous/anxious. OBJECTIVE:  /84   Pulse 95   Temp 97.4 °F (36.3 °C) (Temporal)   Wt 180 lb (81.6 kg)   LMP  (LMP Unknown)   SpO2 98%   BMI 28.19 kg/m²    Physical Exam   Constitutional: She is oriented to person, place, and time. She appears well-developed and well-nourished. HENT:   Head: Normocephalic and atraumatic. Mouth/Throat: Oropharynx is clear and moist.   Eyes: Pupils are equal, round, and reactive to light. Conjunctivae are normal. Right eye exhibits no discharge. Left eye exhibits no discharge. Neck: Normal range of motion. Neck supple. No tracheal deviation present. No thyromegaly present. Cardiovascular: Normal rate and regular rhythm. No murmur heard. Pulmonary/Chest: Effort normal and breath sounds normal. No respiratory distress. She has no wheezes. She has no rales. Musculoskeletal: Normal range of motion. She exhibits no deformity. Lymphadenopathy:     She has no cervical adenopathy. Neurological: She is alert and oriented to person, place, and time. No cranial nerve deficit. Skin: Skin is warm and dry. No rash noted. No erythema. Psychiatric: Her speech is normal. Thought content normal. Her affect is labile. She is slowed. Vitals reviewed. ASSESSMENT/PLAN:  1. Essential (primary) hypertension  Controlled but having side effects to higher dosing of beta blocker  Samples bystolic 5mg given today #93 in place of toprol  Will follow    2. Syncope, cardiogenic  stable    3.  Encounter for well adult exam without abnormal findings  Pap UTD  Getting fasting labs this week  Mammogram with GYN pending  Healthy diet and weight           Return in about 8 weeks (around 6/18/2019).

## 2019-04-25 DIAGNOSIS — Z00.00 ANNUAL PHYSICAL EXAM: ICD-10-CM

## 2019-04-25 LAB
ALBUMIN SERPL-MCNC: 4.3 G/DL (ref 3.5–5.2)
ALP BLD-CCNC: 69 U/L (ref 35–104)
ALT SERPL-CCNC: 13 U/L (ref 5–33)
ANION GAP SERPL CALCULATED.3IONS-SCNC: 18 MMOL/L (ref 7–19)
AST SERPL-CCNC: 15 U/L (ref 5–32)
BASOPHILS ABSOLUTE: 0.1 K/UL (ref 0–0.2)
BASOPHILS RELATIVE PERCENT: 0.9 % (ref 0–1)
BILIRUB SERPL-MCNC: 0.3 MG/DL (ref 0.2–1.2)
BUN BLDV-MCNC: 13 MG/DL (ref 6–20)
CALCIUM SERPL-MCNC: 9.5 MG/DL (ref 8.6–10)
CHLORIDE BLD-SCNC: 98 MMOL/L (ref 98–111)
CHOLESTEROL, FASTING: 273 MG/DL (ref 160–199)
CO2: 22 MMOL/L (ref 22–29)
CREAT SERPL-MCNC: 1 MG/DL (ref 0.5–0.9)
EOSINOPHILS ABSOLUTE: 0.1 K/UL (ref 0–0.6)
EOSINOPHILS RELATIVE PERCENT: 2.1 % (ref 0–5)
GFR NON-AFRICAN AMERICAN: >60
GLUCOSE FASTING: 93 MG/DL (ref 74–109)
HCT VFR BLD CALC: 41.1 % (ref 37–47)
HDLC SERPL-MCNC: 63 MG/DL (ref 65–121)
HEMOGLOBIN: 12.7 G/DL (ref 12–16)
LDL CHOLESTEROL CALCULATED: 176 MG/DL
LYMPHOCYTES ABSOLUTE: 1.4 K/UL (ref 1.1–4.5)
LYMPHOCYTES RELATIVE PERCENT: 27 % (ref 20–40)
MCH RBC QN AUTO: 28.2 PG (ref 27–31)
MCHC RBC AUTO-ENTMCNC: 30.9 G/DL (ref 33–37)
MCV RBC AUTO: 91.1 FL (ref 81–99)
MONOCYTES ABSOLUTE: 0.4 K/UL (ref 0–0.9)
MONOCYTES RELATIVE PERCENT: 7.4 % (ref 0–10)
NEUTROPHILS ABSOLUTE: 3.3 K/UL (ref 1.5–7.5)
NEUTROPHILS RELATIVE PERCENT: 62.2 % (ref 50–65)
PDW BLD-RTO: 14.1 % (ref 11.5–14.5)
PLATELET # BLD: 338 K/UL (ref 130–400)
PMV BLD AUTO: 9.7 FL (ref 9.4–12.3)
POTASSIUM SERPL-SCNC: 3.6 MMOL/L (ref 3.5–5)
RBC # BLD: 4.51 M/UL (ref 4.2–5.4)
SODIUM BLD-SCNC: 138 MMOL/L (ref 136–145)
TOTAL PROTEIN: 8.1 G/DL (ref 6.6–8.7)
TRIGLYCERIDE, FASTING: 169 MG/DL (ref 0–149)
WBC # BLD: 5.3 K/UL (ref 4.8–10.8)

## 2019-05-06 DIAGNOSIS — R55 SYNCOPE, CARDIOGENIC: ICD-10-CM

## 2019-05-06 DIAGNOSIS — I10 ESSENTIAL (PRIMARY) HYPERTENSION: Primary | ICD-10-CM

## 2019-05-06 RX ORDER — NEBIVOLOL 5 MG/1
5 TABLET ORAL DAILY
Qty: 90 TABLET | Refills: 1 | Status: SHIPPED | OUTPATIENT
Start: 2019-05-06 | End: 2020-02-06

## 2019-05-09 ENCOUNTER — NURSE ONLY (OUTPATIENT)
Dept: FAMILY MEDICINE CLINIC | Age: 44
End: 2019-05-09
Payer: COMMERCIAL

## 2019-05-09 DIAGNOSIS — E53.8 VITAMIN B12 DEFICIENCY: Primary | ICD-10-CM

## 2019-05-09 PROCEDURE — 96372 THER/PROPH/DIAG INJ SC/IM: CPT | Performed by: FAMILY MEDICINE

## 2019-05-09 RX ORDER — CYANOCOBALAMIN 1000 UG/ML
1000 INJECTION INTRAMUSCULAR; SUBCUTANEOUS ONCE
Qty: 1 ML | Refills: 0 | Status: SHIPPED | OUTPATIENT
Start: 2019-05-09 | End: 2019-05-09 | Stop reason: CLARIF

## 2019-05-09 RX ORDER — CYANOCOBALAMIN 1000 UG/ML
1000 INJECTION INTRAMUSCULAR; SUBCUTANEOUS ONCE
Status: COMPLETED | OUTPATIENT
Start: 2019-05-09 | End: 2019-05-09

## 2019-05-09 RX ADMIN — CYANOCOBALAMIN 1000 MCG: 1000 INJECTION INTRAMUSCULAR; SUBCUTANEOUS at 16:59

## 2019-06-18 DIAGNOSIS — F51.01 PRIMARY INSOMNIA: ICD-10-CM

## 2019-06-18 RX ORDER — TRAZODONE HYDROCHLORIDE 50 MG/1
TABLET ORAL
Qty: 180 TABLET | Refills: 2 | Status: SHIPPED | OUTPATIENT
Start: 2019-06-18 | End: 2020-02-06 | Stop reason: SDUPTHER

## 2019-06-18 NOTE — TELEPHONE ENCOUNTER
Primitivo Mckinney called to request a refill on her medication.       Last office visit : 4/23/2019   Next office visit : 6/21/2019     Requested Prescriptions     Pending Prescriptions Disp Refills    traZODone (DESYREL) 50 MG tablet [Pharmacy Med Name: TRAZODONE 50MG TABLETS] 180 tablet 2     Sig: TAKE 1 TO 2 TABLETS BY MOUTH AT BEDTIME AS NEEDED            Maxine Bethea MA

## 2019-07-11 ENCOUNTER — OFFICE VISIT (OUTPATIENT)
Dept: FAMILY MEDICINE CLINIC | Age: 44
End: 2019-07-11
Payer: COMMERCIAL

## 2019-07-11 ENCOUNTER — HOSPITAL ENCOUNTER (OUTPATIENT)
Dept: WOMENS IMAGING | Age: 44
Discharge: HOME | End: 2019-07-11
Payer: COMMERCIAL

## 2019-07-11 VITALS
OXYGEN SATURATION: 99 % | DIASTOLIC BLOOD PRESSURE: 82 MMHG | TEMPERATURE: 98.6 F | SYSTOLIC BLOOD PRESSURE: 132 MMHG | HEART RATE: 83 BPM | BODY MASS INDEX: 28.58 KG/M2 | WEIGHT: 182.5 LBS

## 2019-07-11 DIAGNOSIS — I10 ESSENTIAL (PRIMARY) HYPERTENSION: ICD-10-CM

## 2019-07-11 DIAGNOSIS — Z12.31 ENCOUNTER FOR SCREENING MAMMOGRAM FOR BREAST CANCER: ICD-10-CM

## 2019-07-11 DIAGNOSIS — E53.8 VITAMIN B12 DEFICIENCY: ICD-10-CM

## 2019-07-11 DIAGNOSIS — F41.9 ANXIETY: ICD-10-CM

## 2019-07-11 DIAGNOSIS — F51.04 CHRONIC INSOMNIA: Primary | ICD-10-CM

## 2019-07-11 DIAGNOSIS — F51.01 PRIMARY INSOMNIA: ICD-10-CM

## 2019-07-11 PROCEDURE — 99213 OFFICE O/P EST LOW 20 MIN: CPT | Performed by: FAMILY MEDICINE

## 2019-07-11 PROCEDURE — 77063 BREAST TOMOSYNTHESIS BI: CPT

## 2019-07-11 RX ORDER — GABAPENTIN 300 MG/1
900 CAPSULE ORAL NIGHTLY
COMMUNITY
Start: 2019-06-11 | End: 2020-02-06

## 2019-07-11 RX ORDER — BUSPIRONE HYDROCHLORIDE 15 MG/1
TABLET ORAL
Qty: 270 TABLET | Refills: 1 | Status: SHIPPED | OUTPATIENT
Start: 2019-07-11 | End: 2020-02-06

## 2019-07-11 RX ORDER — TEMAZEPAM 15 MG/1
15 CAPSULE ORAL NIGHTLY PRN
Qty: 30 CAPSULE | Refills: 5 | Status: SHIPPED | OUTPATIENT
Start: 2019-07-11 | End: 2019-08-01 | Stop reason: SDUPTHER

## 2019-07-11 RX ORDER — BUTALBITAL, ACETAMINOPHEN AND CAFFEINE 50; 325; 40 MG/1; MG/1; MG/1
1 TABLET ORAL PRN
COMMUNITY
Start: 2017-04-24

## 2019-07-11 ASSESSMENT — ENCOUNTER SYMPTOMS
CHEST TIGHTNESS: 0
DIARRHEA: 0
ANAL BLEEDING: 0
ABDOMINAL PAIN: 0
COUGH: 0
CONSTIPATION: 0
NAUSEA: 0
SHORTNESS OF BREATH: 0

## 2019-07-11 NOTE — PROGRESS NOTES
[address]  [address] [facility]  [address] [place] [address]  Dept: [phone]  Dept Fax: [phone]  Loc: [phone]    Diallo Christianson is a 41 y.o. female who presents today for her medical conditions/complaints as noted below. Diallo Christianson is here for Follow-up        HPI:   CC: Here today to discuss the followin-year-old here for follow-up. She continues to suffer from chronic headaches. She is seeing neurology and pain management at [facility] for occipital nerve blocks and management of these headaches. Currently, her regimen includes receiving regular occipital nerve blocks,gabapentin, aimovig, and using Fioricet as needed. Common trigger for her migraines includes her insomnia. She's tried multiple regimens in the past without much success. She states at most she gets about 3-4 hours of sleep per night. Currently she's taking trazodoneand Lunesta at night without much relief. She was recently switched to by systolic for blood pressure control and palpitations. Overall she feels this medication is much better toleratedthen Toprol. Anxiety symptoms are currently stable. She is satisfied the combination of Cymbalta and BuSpar.     HPI    Past Medical History:   Diagnosis Date    Blurred vision     due to BP problems    Breast cyst, right     Chronic sinusitis     GERD (gastroesophageal reflux disease)     Headache(784.0)     migraines    Hypertension     MVP (mitral valve prolapse)     Neurocardiogenic syncope     Syncope, cardiogenic     Varicose veins     Wears glasses       Past Surgical History:   Procedure Laterality Date    BREAST BIOPSY  2011    US guided right, benign fibroadenoma    TONSILLECTOMY AND ADENOIDECTOMY         Family History   Problem Relation Age of Onset    Other Mother         hypoglycemia    Heart Failure Mother     Heart Disease Mother     Cancer Paternal Grandmother

## 2019-07-12 ENCOUNTER — TELEPHONE (OUTPATIENT)
Dept: WOMENS IMAGING | Age: 44
End: 2019-07-12

## 2019-07-17 ENCOUNTER — TELEPHONE (OUTPATIENT)
Dept: OBGYN | Age: 44
End: 2019-07-17

## 2019-07-19 ENCOUNTER — TELEPHONE (OUTPATIENT)
Dept: OBGYN | Age: 44
End: 2019-07-19

## 2019-07-19 NOTE — TELEPHONE ENCOUNTER
[facility] to see what needs to be [facility], pt thinks something supposed to be but I dont even see she has been scheduled yet with a date and normally they have coordinated that on their end after speaking w pt.

## 2019-07-31 ENCOUNTER — PATIENT MESSAGE (OUTPATIENT)
Dept: FAMILY MEDICINE CLINIC | Age: 44
End: 2019-07-31

## 2019-07-31 DIAGNOSIS — F51.04 CHRONIC INSOMNIA: ICD-10-CM

## 2019-07-31 RX ORDER — MELOXICAM 7.5 MG/1
7.5 TABLET ORAL DAILY PRN
Qty: 30 TABLET | Refills: 5 | Status: SHIPPED | OUTPATIENT
Start: 2019-07-31 | End: 2020-01-14

## 2019-08-01 ENCOUNTER — HOSPITAL ENCOUNTER (OUTPATIENT)
Dept: WOMENS IMAGING | Age: 44
Discharge: HOME | End: 2019-08-01
Payer: COMMERCIAL

## 2019-08-01 DIAGNOSIS — R92.8 ABNORMAL MAMMOGRAM: ICD-10-CM

## 2019-08-01 PROCEDURE — G0279 TOMOSYNTHESIS, MAMMO: HCPCS

## 2019-08-01 RX ORDER — TEMAZEPAM 15 MG/1
30 CAPSULE ORAL NIGHTLY PRN
Qty: 60 CAPSULE | Refills: 5 | Status: SHIPPED | OUTPATIENT
Start: 2019-08-01 | End: 2019-08-31

## 2019-08-01 NOTE — TELEPHONE ENCOUNTER
From: Lisa Momin  To: Osiris Huffman MD  Sent: 7/31/2019 6:43 PM CDT  Subject: Prescription Question    I've been taking two of the Temazepam at night. I tried to do a refill. It's the one that's 30 pills still, so it's saying to soon. I don't have very many left.

## 2019-09-09 ENCOUNTER — OFFICE VISIT (OUTPATIENT)
Dept: FAMILY MEDICINE CLINIC | Age: 44
End: 2019-09-09
Payer: COMMERCIAL

## 2019-09-09 VITALS
SYSTOLIC BLOOD PRESSURE: 150 MMHG | HEART RATE: 72 BPM | HEIGHT: 67 IN | WEIGHT: 184 LBS | BODY MASS INDEX: 28.88 KG/M2 | RESPIRATION RATE: 16 BRPM | OXYGEN SATURATION: 97 % | TEMPERATURE: 97.7 F | DIASTOLIC BLOOD PRESSURE: 106 MMHG

## 2019-09-09 DIAGNOSIS — E53.8 VITAMIN B12 DEFICIENCY: ICD-10-CM

## 2019-09-09 DIAGNOSIS — J01.40 ACUTE NON-RECURRENT PANSINUSITIS: ICD-10-CM

## 2019-09-09 DIAGNOSIS — I10 ESSENTIAL (PRIMARY) HYPERTENSION: Primary | ICD-10-CM

## 2019-09-09 DIAGNOSIS — G43.709 CHRONIC MIGRAINE WITHOUT AURA WITHOUT STATUS MIGRAINOSUS, NOT INTRACTABLE: ICD-10-CM

## 2019-09-09 DIAGNOSIS — M26.609 TMJ (TEMPOROMANDIBULAR JOINT SYNDROME): ICD-10-CM

## 2019-09-09 PROCEDURE — G8419 CALC BMI OUT NRM PARAM NOF/U: HCPCS | Performed by: NURSE PRACTITIONER

## 2019-09-09 PROCEDURE — 99214 OFFICE O/P EST MOD 30 MIN: CPT | Performed by: NURSE PRACTITIONER

## 2019-09-09 PROCEDURE — 96372 THER/PROPH/DIAG INJ SC/IM: CPT | Performed by: NURSE PRACTITIONER

## 2019-09-09 PROCEDURE — G8427 DOCREV CUR MEDS BY ELIG CLIN: HCPCS | Performed by: NURSE PRACTITIONER

## 2019-09-09 PROCEDURE — 1036F TOBACCO NON-USER: CPT | Performed by: NURSE PRACTITIONER

## 2019-09-09 RX ORDER — CLARITHROMYCIN 500 MG/1
500 TABLET, COATED ORAL 2 TIMES DAILY
Qty: 14 TABLET | Refills: 0 | Status: SHIPPED | OUTPATIENT
Start: 2019-09-09 | End: 2019-09-16

## 2019-09-09 RX ORDER — CYANOCOBALAMIN 1000 UG/ML
1000 INJECTION INTRAMUSCULAR; SUBCUTANEOUS ONCE
Status: COMPLETED | OUTPATIENT
Start: 2019-09-09 | End: 2019-09-09

## 2019-09-09 RX ORDER — FLUTICASONE PROPIONATE 50 MCG
2 SPRAY, SUSPENSION (ML) NASAL DAILY
Qty: 1 BOTTLE | Refills: 5 | Status: SHIPPED | OUTPATIENT
Start: 2019-09-09

## 2019-09-09 RX ADMIN — CYANOCOBALAMIN 1000 MCG: 1000 INJECTION INTRAMUSCULAR; SUBCUTANEOUS at 15:32

## 2019-09-09 ASSESSMENT — ENCOUNTER SYMPTOMS
CHEST TIGHTNESS: 0
DIARRHEA: 0
NAUSEA: 0
SORE THROAT: 0
SINUS PRESSURE: 1
SHORTNESS OF BREATH: 0
WHEEZING: 0
COUGH: 0
TROUBLE SWALLOWING: 0
ABDOMINAL PAIN: 0

## 2019-09-09 NOTE — PROGRESS NOTES
(FLONASE) 50 MCG/ACT nasal spray; 2 sprays by Each Nostril route daily      Medications Discontinued During This Encounter   Medication Reason    DULoxetine (CYMBALTA) 60 MG extended release capsule LIST CLEANUP    DANDELION PO LIST CLEANUP    levonorgestrel-ethinyl estradiol (INTROVALE) 0.15-0.03 MG per tablet LIST CLEANUP     There are no Patient Instructions on file for this visit. Patient voicesunderstanding and agrees to plans along with risks and benefits of plan. Counseling:  Marcelle Villafana's case, medications and options were discussed in detail. Patient was instructed to call the office if she questionsregarding her treatment. Should her conditions worsen, she should return to office to be reassessed by ALTA Felix. she Should to go the closest Emergency Department for any emergency. They verbalizedunderstanding the above instructions. No follow-ups on file.

## 2019-11-15 ENCOUNTER — NURSE ONLY (OUTPATIENT)
Dept: FAMILY MEDICINE CLINIC | Age: 44
End: 2019-11-15
Payer: COMMERCIAL

## 2019-11-15 DIAGNOSIS — F51.01 PRIMARY INSOMNIA: ICD-10-CM

## 2019-11-15 DIAGNOSIS — Z79.899 MEDICATION MANAGEMENT: Primary | ICD-10-CM

## 2019-11-15 LAB
AMPHETAMINE SCREEN, URINE: NORMAL
BARBITURATE SCREEN, URINE: NORMAL
BENZODIAZEPINE SCREEN, URINE: NORMAL
BUPRENORPHINE URINE: NORMAL
COCAINE METABOLITE SCREEN URINE: NORMAL
GABAPENTIN SCREEN, URINE: NORMAL
MDMA URINE: NORMAL
METHADONE SCREEN, URINE: NORMAL
METHAMPHETAMINE, URINE: NORMAL
OPIATE SCREEN URINE: NORMAL
OXYCODONE SCREEN URINE: NORMAL
PHENCYCLIDINE SCREEN URINE: NORMAL
PROPOXYPHENE SCREEN, URINE: NORMAL
THC SCREEN, URINE: NORMAL
TRICYCLIC ANTIDEPRESSANTS, UR: NORMAL

## 2019-11-15 PROCEDURE — 80305 DRUG TEST PRSMV DIR OPT OBS: CPT | Performed by: FAMILY MEDICINE

## 2019-11-18 RX ORDER — ESZOPICLONE 3 MG/1
TABLET, FILM COATED ORAL
Qty: 30 TABLET | Refills: 2 | Status: SHIPPED | OUTPATIENT
Start: 2019-11-18 | End: 2020-01-13

## 2020-01-14 RX ORDER — MELOXICAM 7.5 MG/1
7.5 TABLET ORAL DAILY PRN
Qty: 30 TABLET | Refills: 5 | Status: SHIPPED | OUTPATIENT
Start: 2020-01-14 | End: 2020-05-07 | Stop reason: ALTCHOICE

## 2020-02-06 ENCOUNTER — OFFICE VISIT (OUTPATIENT)
Dept: FAMILY MEDICINE CLINIC | Age: 45
End: 2020-02-06
Payer: COMMERCIAL

## 2020-02-06 VITALS
OXYGEN SATURATION: 96 % | WEIGHT: 180 LBS | HEART RATE: 78 BPM | DIASTOLIC BLOOD PRESSURE: 100 MMHG | SYSTOLIC BLOOD PRESSURE: 142 MMHG | TEMPERATURE: 98 F | BODY MASS INDEX: 28.19 KG/M2

## 2020-02-06 PROCEDURE — 99214 OFFICE O/P EST MOD 30 MIN: CPT | Performed by: CLINICAL NURSE SPECIALIST

## 2020-02-06 PROCEDURE — 96372 THER/PROPH/DIAG INJ SC/IM: CPT | Performed by: CLINICAL NURSE SPECIALIST

## 2020-02-06 RX ORDER — TIZANIDINE HYDROCHLORIDE 4 MG/1
CAPSULE, GELATIN COATED ORAL
Qty: 180 CAPSULE | Refills: 2 | Status: SHIPPED | OUTPATIENT
Start: 2020-02-06

## 2020-02-06 RX ORDER — METOPROLOL SUCCINATE 50 MG/1
TABLET, EXTENDED RELEASE ORAL
COMMUNITY
Start: 2019-12-31 | End: 2020-02-06 | Stop reason: SDUPTHER

## 2020-02-06 RX ORDER — TRAZODONE HYDROCHLORIDE 50 MG/1
TABLET ORAL
Qty: 180 TABLET | Refills: 2 | Status: SHIPPED | OUTPATIENT
Start: 2020-02-06 | End: 2020-08-11 | Stop reason: SDUPTHER

## 2020-02-06 RX ORDER — ONDANSETRON 2 MG/ML
4 INJECTION INTRAMUSCULAR; INTRAVENOUS ONCE
Status: COMPLETED | OUTPATIENT
Start: 2020-02-06 | End: 2020-02-06

## 2020-02-06 RX ORDER — ESZOPICLONE 3 MG/1
3 TABLET, FILM COATED ORAL NIGHTLY
Qty: 30 TABLET | Refills: 0 | Status: SHIPPED | OUTPATIENT
Start: 2020-02-06 | End: 2020-03-17

## 2020-02-06 RX ORDER — ESZOPICLONE 3 MG/1
TABLET, FILM COATED ORAL
COMMUNITY
Start: 2020-01-15 | End: 2020-02-06 | Stop reason: SDUPTHER

## 2020-02-06 RX ORDER — CYANOCOBALAMIN 1000 UG/ML
1000 INJECTION INTRAMUSCULAR; SUBCUTANEOUS ONCE
Status: COMPLETED | OUTPATIENT
Start: 2020-02-06 | End: 2020-02-06

## 2020-02-06 RX ORDER — HYDROXYZINE PAMOATE 25 MG/1
25 CAPSULE ORAL 3 TIMES DAILY PRN
Qty: 30 CAPSULE | Refills: 0 | Status: SHIPPED | OUTPATIENT
Start: 2020-02-06 | End: 2020-02-25 | Stop reason: SDUPTHER

## 2020-02-06 RX ORDER — ONDANSETRON 4 MG/1
TABLET, FILM COATED ORAL
Qty: 20 TABLET | Refills: 5 | Status: SHIPPED | OUTPATIENT
Start: 2020-02-06

## 2020-02-06 RX ORDER — METOPROLOL SUCCINATE 50 MG/1
50 TABLET, EXTENDED RELEASE ORAL DAILY
Qty: 90 TABLET | Refills: 1 | Status: SHIPPED | OUTPATIENT
Start: 2020-02-06 | End: 2020-05-07 | Stop reason: ALTCHOICE

## 2020-02-06 RX ADMIN — ONDANSETRON 4 MG: 2 INJECTION INTRAMUSCULAR; INTRAVENOUS at 16:47

## 2020-02-06 RX ADMIN — CYANOCOBALAMIN 1000 MCG: 1000 INJECTION INTRAMUSCULAR; SUBCUTANEOUS at 16:44

## 2020-02-06 NOTE — PROGRESS NOTES
SUBJECTIVE:  Yenny Vila is a 45 y.o. who presents today for 3 Month Follow-Up      HPI    Kareem Pacheco presents today for 3 month follow up. Primary insomnia, off restoril and back on lunesta and trazodone. restoril was less effective than lunesta. She will still be up in early morning hours with this regimen, but is able to get 4-5 hours of sleep. She states since spouse started using CPAP she is resting better as well. There are no side effects to this regimen. She wakes up without drowsiness. No misuse or abuse. Almeta Beers and UDS UTD. Essential hypertension with cardiogenic syncope, on toprol. bystolic was expensive. Her blood pressure is elevated today. She is having increased neck pain and headaches, her injections at [facility] are due on Tuesday. She took toprol last night around midnight. No acute symptoms other than her typical migraine/pain related symptoms. Chronic migraines, followed by neurology and pain mgt at [facility]. Not currently on gabapentin due to side effects and off aimovig due to shipment reasons. She is nauseated today, has been vomiting at times. Has zofran at home. Vitamin b12 def, overdue for injection    Anxiety, not well controlled. buspar was ineffective so she stopped taking it. She feels nervous from the inside out.      Past Medical History:   Diagnosis Date    Blurred vision     due to BP problems    Breast cyst, right     Chronic sinusitis     GERD (gastroesophageal reflux disease)     Headache(784.0)     migraines    Hypertension     MVP (mitral valve prolapse)     Neurocardiogenic syncope     Syncope, cardiogenic     Varicose veins     Wears glasses      Past Surgical History:   Procedure Laterality Date    BREAST BIOPSY  12/2/2011    US guided right, benign fibroadenoma    TONSILLECTOMY AND ADENOIDECTOMY       Family History   Problem Relation Age of Onset    Other Mother         hypoglycemia    Heart Failure Mother    Konstantin Hitchcock Heart Disease Mother     Cancer Paternal Grandmother         Hodgkin's    Breast Cancer Paternal Grandmother      Social History     Tobacco Use    Smoking status: Never Smoker    Smokeless tobacco: Never Used   Substance Use Topics    Alcohol use: Yes     Comment: rare     Current Outpatient Medications   Medication Sig Dispense Refill    tiZANidine (ZANAFLEX) 4 MG capsule TAKE 1 CAPSULE BY MOUTH EVERY 12 HOURS AS NEEDED FOR HEADACHE 180 capsule 2    traZODone (DESYREL) 50 MG tablet Take 1-2 tablets at bedtime as need for sleep 180 tablet 2    metoprolol succinate (TOPROL XL) 50 MG extended release tablet Take 1 tablet by mouth daily 90 tablet 1    eszopiclone (LUNESTA) 3 MG TABS Take 1 tablet by mouth nightly for 30 days. 30 tablet 0    ondansetron (ZOFRAN) 4 MG tablet TAKE 1 TABLET BY MOUTH EVERY 8 HOURS AS NEEDED FOR NAUSEA 20 tablet 5    hydrOXYzine (VISTARIL) 25 MG capsule Take 1 capsule by mouth 3 times daily as needed for Anxiety 30 capsule 0    fluticasone (FLONASE) 50 MCG/ACT nasal spray 2 sprays by Each Nostril route daily 1 Bottle 5    butalbital-acetaminophen-caffeine (FIORICET, ESGIC) -40 MG per tablet Take 1 tablet by mouth as needed      Bupivacaine HCl (LOCAL NERVE BLOCK SYRINGE, WITHOUT EPINEPHRINE,) 30 mLs by Infiltration route every 3 months Indications: Occipital nerve block shot      rizatriptan (MAXALT) 10 MG tablet TAKE 1 TABLET BY MOUTH 1 TIME AS NEEDED FOR MIGRAINE. MAY REPEAT 1 TIME AFTER 2 HOURS. MAX 20 MG DAILY 18 tablet 5    Oxymetazoline HCl (AFRIN 12 HOUR NA) by Nasal route as needed      meloxicam (MOBIC) 7.5 MG tablet TAKE 1 TABLET BY MOUTH DAILY AS NEEDED FOR PAIN (Patient not taking: Reported on 2/6/2020) 30 tablet 5    Erenumab-aooe (AIMOVIG 140 DOSE) 70 MG/ML SOAJ       Alfalfa 500 MG TABS Take by mouth       No current facility-administered medications for this visit.       Allergies   Allergen Reactions    Penicillins Other (See Comments)     Skin deviation. Cardiovascular:      Rate and Rhythm: Normal rate and regular rhythm. Heart sounds: No murmur. Pulmonary:      Effort: Pulmonary effort is normal. No respiratory distress. Breath sounds: Normal breath sounds. No wheezing or rales. Genitourinary:     Vagina: Normal.   Musculoskeletal: Normal range of motion. General: No swelling, tenderness or deformity. Lymphadenopathy:      Cervical: No cervical adenopathy. Skin:     General: Skin is warm and dry. Findings: No erythema or rash. Neurological:      General: No focal deficit present. Mental Status: She is alert and oriented to person, place, and time. Mental status is at baseline. Psychiatric:         Mood and Affect: Mood is anxious. Speech: Speech normal.         Behavior: Behavior normal. Behavior is cooperative. Thought Content: Thought content normal.         Cognition and Memory: Cognition and memory normal.         ASSESSMENT/PLAN:  1. Primary insomnia  unchanged  - traZODone (DESYREL) 50 MG tablet; Take 1-2 tablets at bedtime as need for sleep  Dispense: 180 tablet; Refill: 2  - eszopiclone (LUNESTA) 3 MG TABS; Take 1 tablet by mouth nightly for 30 days. Dispense: 30 tablet; Refill: 0    2. Essential (primary) hypertension  Elevated today, recommend going home and taking at least an extra 1/2 toprol  Suspect pain related  - metoprolol succinate (TOPROL XL) 50 MG extended release tablet; Take 1 tablet by mouth daily  Dispense: 90 tablet; Refill: 1    3. Chronic migraine without aura without status migrainosus, not intractable  unchanged  - tiZANidine (ZANAFLEX) 4 MG capsule; TAKE 1 CAPSULE BY MOUTH EVERY 12 HOURS AS NEEDED FOR HEADACHE  Dispense: 180 capsule; Refill: 2  - ondansetron (ZOFRAN) 4 MG tablet; TAKE 1 TABLET BY MOUTH EVERY 8 HOURS AS NEEDED FOR NAUSEA  Dispense: 20 tablet; Refill: 5  - ondansetron (ZOFRAN) injection 4 mg    4.  Vitamin B12 deficiency  - cyanocobalamin injection

## 2020-02-07 ENCOUNTER — TELEPHONE (OUTPATIENT)
Dept: FAMILY MEDICINE CLINIC | Age: 45
End: 2020-02-07

## 2020-02-07 ASSESSMENT — ENCOUNTER SYMPTOMS
COLOR CHANGE: 0
EYE REDNESS: 0
EYE PAIN: 0
WHEEZING: 0
EYE DISCHARGE: 0
COUGH: 0
DIARRHEA: 0
CONSTIPATION: 0
FACIAL SWELLING: 0
TROUBLE SWALLOWING: 0
NAUSEA: 1
ABDOMINAL PAIN: 0
BACK PAIN: 0
VOMITING: 1
SHORTNESS OF BREATH: 0
SORE THROAT: 0
CHEST TIGHTNESS: 0
SINUS PRESSURE: 0

## 2020-02-07 NOTE — TELEPHONE ENCOUNTER
Left vm for patient  notifying him that the FMLA forms have been completely by [facility] and are ready for . Forms are at the  for patient to .

## 2020-03-16 RX ORDER — RIZATRIPTAN BENZOATE 10 MG/1
10 TABLET ORAL DAILY PRN
Qty: 9 TABLET | Refills: 5 | Status: SHIPPED | OUTPATIENT
Start: 2020-03-16 | End: 2020-09-22 | Stop reason: SDUPTHER

## 2020-03-16 NOTE — TELEPHONE ENCOUNTER
Requested Prescriptions     Pending Prescriptions Disp Refills    rizatriptan (MAXALT) 10 MG tablet 18 tablet 5     Sig: May repeat in 2 hours if needed       Last Office Visit: 10/16/2017  Next Office Visit: Visit date not found  Last Medication Refill: 9/11/2018 with 5 refills

## 2020-03-17 RX ORDER — ESZOPICLONE 3 MG/1
TABLET, FILM COATED ORAL
Qty: 30 TABLET | Refills: 2 | Status: SHIPPED | OUTPATIENT
Start: 2020-03-17 | End: 2020-06-11

## 2020-03-26 RX ORDER — METOPROLOL SUCCINATE 50 MG/1
50 TABLET, EXTENDED RELEASE ORAL DAILY
Qty: 90 TABLET | Refills: 1 | OUTPATIENT
Start: 2020-03-26

## 2020-03-27 RX ORDER — METRONIDAZOLE 500 MG/1
500 TABLET ORAL 2 TIMES DAILY
Qty: 14 TABLET | Refills: 0 | Status: SHIPPED | OUTPATIENT
Start: 2020-03-27 | End: 2020-04-03

## 2020-04-02 ENCOUNTER — PATIENT MESSAGE (OUTPATIENT)
Dept: OBGYN | Age: 45
End: 2020-04-02

## 2020-04-03 RX ORDER — LEVONORGESTREL AND ETHINYL ESTRADIOL 0.15-0.03
1 KIT ORAL DAILY
Qty: 91 TABLET | Refills: 0 | Status: SHIPPED | OUTPATIENT
Start: 2020-04-03 | End: 2020-06-10 | Stop reason: SDUPTHER

## 2020-04-21 ENCOUNTER — TELEPHONE (OUTPATIENT)
Dept: OTOLARYNGOLOGY | Facility: CLINIC | Age: 45
End: 2020-04-21

## 2020-05-07 ENCOUNTER — TELEMEDICINE (OUTPATIENT)
Dept: FAMILY MEDICINE CLINIC | Age: 45
End: 2020-05-07
Payer: COMMERCIAL

## 2020-05-07 PROCEDURE — 99214 OFFICE O/P EST MOD 30 MIN: CPT | Performed by: FAMILY MEDICINE

## 2020-05-07 RX ORDER — CARVEDILOL 6.25 MG/1
6.25 TABLET ORAL 2 TIMES DAILY
Qty: 60 TABLET | Refills: 3 | Status: SHIPPED | OUTPATIENT
Start: 2020-05-07 | End: 2020-08-31

## 2020-05-07 RX ORDER — FLUOXETINE 10 MG/1
10 CAPSULE ORAL DAILY
Qty: 30 CAPSULE | Refills: 3 | Status: SHIPPED | OUTPATIENT
Start: 2020-05-07 | End: 2020-06-08 | Stop reason: SDUPTHER

## 2020-05-07 NOTE — PROGRESS NOTES
estradiol (INTROVALE) 0.15-0.03 MG per tablet Take 1 tablet by mouth daily  ALTA Fitzgerald   eszopiclone (LUNESTA) 3 MG TABS TAKE 1 TABLET BY MOUTH EVERY NIGHT  ALTA Franz   rizatriptan (MAXALT) 10 MG tablet Take 1 tablet by mouth daily as needed for Migraine (may repeat x 1 after two hours, max 2 tabs / 24 hours) May repeat in 2 hours if needed  Sofy Showers, DO   hydrOXYzine (VISTARIL) 25 MG capsule Take 1 capsule by mouth 3 times daily as needed for Anxiety  ALTA Franz   tiZANidine (ZANAFLEX) 4 MG capsule TAKE 1 CAPSULE BY MOUTH EVERY 12 HOURS AS NEEDED FOR HEADACHE  ALTA Franz   traZODone (DESYREL) 50 MG tablet Take 1-2 tablets at bedtime as need for sleep  ALTA Franz   ondansetron (ZOFRAN) 4 MG tablet TAKE 1 TABLET BY MOUTH EVERY 8 HOURS AS NEEDED FOR NAUSEA  ALTA Franz   fluticasone (FLONASE) 50 MCG/ACT nasal spray 2 sprays by Each Nostril route daily  ALTA Sousa   butalbital-acetaminophen-caffeine (FIORICET, ESGIC) -40 MG per tablet Take 1 tablet by mouth as needed  Historical Provider, MD   Erenumab-aooe (AIMOVIG 140 DOSE) 70 MG/ML SOAJ   Historical Provider, MD   Bupivacaine HCl (LOCAL NERVE BLOCK SYRINGE, WITHOUT EPINEPHRINE,) 30 mLs by Infiltration route every 3 months Indications: Occipital nerve block shot  Historical Provider, MD   [place] 500 MG TABS Take by mouth  Historical Provider, MD   Oxymetazoline HCl (AFRIN 12 HOUR NA) by Nasal route as needed  Historical Provider, MD       Social History     Tobacco Use    Smoking status: Never Smoker    Smokeless tobacco: Never Used   Substance Use Topics    Alcohol use: Yes     Comment: rare    Drug use:  No            PHYSICAL EXAMINATION:  [ INSTRUCTIONS:  \"[x]\" Indicates a positive item  \"[]\" Indicates a negative item  -- DELETE ALL ITEMS NOT EXAMINED]  Vital Signs: (As obtained by patient/caregiver or practitioner observation)    Blood pressure-  Heart rate- Respiratory rate-    Temperature-  Pulse oximetry-     Constitutional: [x] Appears well-developed and well-nourished [x] No apparent distress      [] Abnormal-   Mental status  [x] Alert and awake  [] Oriented to person/place/time [x]Able to follow commands      Eyes:  EOM    [x]  Normal  [] Abnormal-  Sclera  [x]  Normal  [] Abnormal -         Discharge []  None visible  [] Abnormal -    HENT:   [x] Normocephalic, atraumatic. [] Abnormal   [] Mouth/Throat: Mucous membranes are moist.     External Ears [x] Normal  [] Abnormal-     Neck: [x] No visualized mass     Pulmonary/Chest: [x] Respiratory effort normal.  [x] No visualized signs of difficulty breathing or respiratory distress        [] Abnormal-      Musculoskeletal:   [] Normal gait with no signs of ataxia         [] Normal range of motion of neck        [] Abnormal-       Neurological:        [] No Facial Asymmetry (Cranial nerve 7 motor function) (limited exam to video visit)          [] No gaze palsy        [] Abnormal-         Skin:        [x] No significant exanthematous lesions or discoloration noted on facial skin         [] Abnormal-            Psychiatric:       [x] Normal Affect [x] No Hallucinations        [] Abnormal-     Other pertinent observable physical exam findings-     No results found for this or any previous visit (from the past 672 hour(s)). ASSESSMENT/PLAN:  1. Essential (primary) hypertension  BP Readings from Last 3 Encounters:   02/06/20 (!) 142/100   09/09/19 (!) 150/106   07/11/19 132/82   Increase carvedilol to 6.25 mg twice a day and follow-up in 1 month. Recommend she continue checking her blood pressure weekly and will review readings at next visit      2. Primary insomnia  Hopefully the addition of the Prozac will help her insomnia. In the meantime we will continue with her Lunesta as well. 3. Chronic nonseasonal allergic rhinitis due to pollen  Continue with Flonase and over-the-counter antihistamines    4. Chronic insomnia  As above    5. Chronic migraine without aura without status migrainosus, not intractable  Continue with recommendations per neurology. She has refills of her Maxalt    6. TMJ (temporomandibular joint syndrome)  As above    7. Depression with anxiety  We will add Prozac 10 mg daily  Discussed risks and benefits of this new medication. Discussed potential side effects. She voiced understanding. She may continue taking hydroxyzine as needed for anxiety      Return in about 1 month (around 6/7/2020) for Routine follow up - 15 minute visit. Jignesh Washington is a 45 y.o. female being evaluated by a Virtual Visit (video visit) encounter to address concerns as mentioned above. A caregiver was present when appropriate. Due to this being a TeleHealth encounter (During [facility] public health emergency), evaluation of the following organ systems was limited: Vitals/Constitutional/EENT/Resp/CV/GI//MS/Neuro/Skin/Heme-Lymph-Imm. Pursuant to the emergency declaration under the [address] [place] [address], [address] [place] [address] authority and the [employer] Act, this Virtual Visit was conducted with patient's (and/or legal guardian's) consent, to reduce the patient's risk of exposure to COVID-19 and provide necessary medical care. The patient (and/or legal guardian) has also been advised to contact this office for worsening conditions or problems, and seek emergency medical treatment and/or call 911 if deemed necessary. Patient identification was verified at the start of the visit: Yes    Total time spent on this encounter: Not billed by time    Services were provided through a video synchronous discussion virtually to substitute for in-person clinic visit. Patient and provider were located at their individual homes. --Geovani Pisano MD on 5/13/2020 at 10:29 AM    An electronic signature was used to authenticate this note.

## 2020-05-13 ASSESSMENT — ENCOUNTER SYMPTOMS
DIARRHEA: 0
ANAL BLEEDING: 0
CONSTIPATION: 0
SHORTNESS OF BREATH: 0
NAUSEA: 0
COUGH: 0
CHEST TIGHTNESS: 0
ABDOMINAL PAIN: 0

## 2020-06-04 ENCOUNTER — OFFICE VISIT (OUTPATIENT)
Dept: OBGYN | Age: 45
End: 2020-06-04
Payer: COMMERCIAL

## 2020-06-04 VITALS
HEART RATE: 85 BPM | BODY MASS INDEX: 28.25 KG/M2 | WEIGHT: 180 LBS | SYSTOLIC BLOOD PRESSURE: 137 MMHG | DIASTOLIC BLOOD PRESSURE: 100 MMHG | HEIGHT: 67 IN

## 2020-06-04 PROCEDURE — 99396 PREV VISIT EST AGE 40-64: CPT | Performed by: OBSTETRICS & GYNECOLOGY

## 2020-06-04 ASSESSMENT — ENCOUNTER SYMPTOMS
RESPIRATORY NEGATIVE: 1
EYES NEGATIVE: 1
GASTROINTESTINAL NEGATIVE: 1

## 2020-06-04 NOTE — PROGRESS NOTES
on file     Non-medical: Not on file   Tobacco Use    Smoking status: Never Smoker    Smokeless tobacco: Never Used   Substance and Sexual Activity    Alcohol use: Yes     Comment: rare    Drug use: No    Sexual activity: Yes     Partners: Male   Lifestyle    Physical activity     Days per week: Not on file     Minutes per session: Not on file    Stress: Not on file   Relationships    Social connections     Talks on phone: Not on file     Gets together: Not on file     Attends Oriental orthodox service: Not on file     Active member of club or organization: Not on file     Attends meetings of clubs or organizations: Not on file     Relationship status: Not on file    Intimate partner violence     Fear of current or ex partner: Not on file     Emotionally abused: Not on file     Physically abused: Not on file     Forced sexual activity: Not on file   Other Topics Concern    Not on file   Social History Narrative    Not on file         Current Outpatient Medications:     carvedilol (COREG) 6.25 MG tablet, Take 1 tablet by mouth 2 times daily, Disp: 60 tablet, Rfl: 3    rizatriptan (MAXALT) 10 MG tablet, Take 1 tablet by mouth daily as needed for Migraine (may repeat x 1 after two hours, max 2 tabs / 24 hours) May repeat in 2 hours if needed, Disp: 9 tablet, Rfl: 5    tiZANidine (ZANAFLEX) 4 MG capsule, TAKE 1 CAPSULE BY MOUTH EVERY 12 HOURS AS NEEDED FOR HEADACHE, Disp: 180 capsule, Rfl: 2    traZODone (DESYREL) 50 MG tablet, Take 1-2 tablets at bedtime as need for sleep, Disp: 180 tablet, Rfl: 2    ondansetron (ZOFRAN) 4 MG tablet, TAKE 1 TABLET BY MOUTH EVERY 8 HOURS AS NEEDED FOR NAUSEA, Disp: 20 tablet, Rfl: 5    fluticasone (FLONASE) 50 MCG/ACT nasal spray, 2 sprays by Each Nostril route daily, Disp: 1 Bottle, Rfl: 5    butalbital-acetaminophen-caffeine (FIORICET, ESGIC) -40 MG per tablet, Take 1 tablet by mouth as needed, Disp: , Rfl:     Erenumab-aooe (AIMOVIG 140 DOSE) 70 MG/ML SOAJ, , Disp: , Rfl:     Bupivacaine HCl (LOCAL NERVE BLOCK SYRINGE, WITHOUT EPINEPHRINE,), 30 mLs by Infiltration route every 3 months Indications: Occipital nerve block shot, Disp: , Rfl:     Alfalfa 500 MG TABS, Take by mouth, Disp: , Rfl:     Oxymetazoline HCl (AFRIN 12 HOUR NA), by Nasal route as needed, Disp: , Rfl:     metroNIDAZOLE (FLAGYL) 500 MG tablet, Take 1 tablet by mouth 2 times daily for 7 days, Disp: 14 tablet, Rfl: 0    eszopiclone (LUNESTA) 3 MG TABS, TAKE 1 TABLET BY MOUTH EVERY NIGHT, Disp: 30 tablet, Rfl: 2    levonorgestrel-ethinyl estradiol (INTROVALE) 0.15-0.03 MG per tablet, Take 1 tablet by mouth daily, Disp: 91 tablet, Rfl: 0    FLUoxetine (PROZAC) 20 MG capsule, Take 1 capsule by mouth daily, Disp: 30 capsule, Rfl: 5    hydrOXYzine (VISTARIL) 25 MG capsule, Take 1 capsule by mouth 3 times daily as needed for Anxiety, Disp: 270 capsule, Rfl: 2    Allergies   Allergen Reactions    Penicillins Other (See Comments)     Skin peeling off in insides of hands       BP (!) 137/100   Pulse 85   Ht 5' 7\" (1.702 m)   Wt 180 lb (81.6 kg)   LMP 05/28/2020   BMI 28.19 kg/m²   Physical Exam  Constitutional:       General: She is not in acute distress. Appearance: She is well-developed. HENT:      Head: Normocephalic and atraumatic. Eyes:      Conjunctiva/sclera: Conjunctivae normal.      Pupils: Pupils are equal, round, and reactive to light. Neck:      Musculoskeletal: Normal range of motion and neck supple. Thyroid: No thyromegaly. Trachea: No tracheal deviation. Cardiovascular:      Rate and Rhythm: Normal rate and regular rhythm. Pulmonary:      Effort: Pulmonary effort is normal. No respiratory distress. Breath sounds: Normal breath sounds. Chest:      Breasts: Breasts are symmetrical.         Right: No inverted nipple, mass, nipple discharge, skin change or tenderness. Left: No inverted nipple, mass, nipple discharge, skin change or tenderness.    Abdominal: General: Bowel sounds are normal. There is no distension. Palpations: Abdomen is soft. There is no mass. Tenderness: There is no abdominal tenderness. There is no guarding or rebound. Genitourinary:     Labia:         Right: No rash, tenderness or lesion. Left: No rash, tenderness or lesion. Vagina: Vaginal discharge present. Cervix: No cervical motion tenderness. Adnexa:         Right: No mass, tenderness or fullness. Left: No mass, tenderness or fullness. Rectum: Normal.   Musculoskeletal: Normal range of motion. General: No tenderness. Lymphadenopathy:      Cervical: No cervical adenopathy. Skin:     General: Skin is warm and dry. Findings: No rash. Neurological:      Mental Status: She is alert and oriented to person, place, and time. Cranial Nerves: No cranial nerve deficit. Psychiatric:         Speech: Speech normal.         Behavior: Behavior normal.         Thought Content: Thought content normal.         Judgment: Judgment normal.               Assessment   Diagnosis Orders   1. Women's annual routine gynecological examination     2. Screening for cervical cancer  Human papillomavirus (HPV) DNA probe thin prep high risk    PAP SMEAR   3. Screening for HPV (human papillomavirus)  Human papillomavirus (HPV) DNA probe thin prep high risk    PAP SMEAR   4. Encounter for screening mammogram for breast cancer  [facility] Screening Bilateral       Plan     1. Pap smear and HPV  2. Mammogram order  3. Vaginal swab  4. RTC one year or prn. John Peters am scribing for and in the presence of Dr. Mery Ott. I, Dr. Mery Ott, personally performed the services described in this documentation as scribed by Lincoln Garrett in my presence, and it is both accurate and complete.

## 2020-06-08 ENCOUNTER — TELEMEDICINE (OUTPATIENT)
Dept: FAMILY MEDICINE CLINIC | Age: 45
End: 2020-06-08
Payer: COMMERCIAL

## 2020-06-08 PROCEDURE — 99213 OFFICE O/P EST LOW 20 MIN: CPT | Performed by: FAMILY MEDICINE

## 2020-06-08 PROCEDURE — G8419 CALC BMI OUT NRM PARAM NOF/U: HCPCS | Performed by: FAMILY MEDICINE

## 2020-06-08 PROCEDURE — 1036F TOBACCO NON-USER: CPT | Performed by: FAMILY MEDICINE

## 2020-06-08 PROCEDURE — G8428 CUR MEDS NOT DOCUMENT: HCPCS | Performed by: FAMILY MEDICINE

## 2020-06-08 RX ORDER — FLUOXETINE HYDROCHLORIDE 20 MG/1
20 CAPSULE ORAL DAILY
Qty: 30 CAPSULE | Refills: 5 | Status: SHIPPED | OUTPATIENT
Start: 2020-06-08 | End: 2020-08-11 | Stop reason: SDUPTHER

## 2020-06-08 NOTE — PROGRESS NOTES
2020    TELEHEALTH EVALUATION -- Audio/Visual (During [facility]-64 public health emergency)    HPI:    Wilson Henry (:  1975) has requested an audio/video evaluation for the following concern(s):    Hypertension  Compliant with medications. No adverse effects from medication. No lightheadedness, palpitations, or chest pain. Depression with Anxiety  She feels her depression with anxiety has improved with Prozac. She is tolerating the medication. However, she is requesting a higher dose. She is had no episodes of syncope since her last visit. Migraines continue to be an issue for her. Overall her migraine status is stable. She does have daily headaches at varying degrees. Continues to be followed by neurology            Review of Systems  Review of Systems   Constitutional: Negative for chills and fever. HENT: Negative for congestion. Respiratory: Negative for cough, chest tightness and shortness of breath. Cardiovascular: Negative for chest pain, palpitations and leg swelling. Gastrointestinal: Negative for abdominal pain, anal bleeding, constipation, diarrhea and nausea. Genitourinary: Negative for difficulty urinating. Psychiatric/Behavioral: Negative. Prior to Visit Medications    Medication Sig Taking?  Authorizing Provider   FLUoxetine (PROZAC) 20 MG capsule Take 1 capsule by mouth daily Yes Harshil Parada MD   metroNIDAZOLE (FLAGYL) 500 MG tablet Take 1 tablet by mouth 2 times daily for 7 days  Tino Dandy, MD   eszopiclone (LUNESTA) 3 MG TABS TAKE 1 TABLET BY MOUTH EVERY NIGHT  Harshil Parada MD   levonorgestrel-ethinyl estradiol (INTROVALE) 0.15-0.03 MG per tablet Take 1 tablet by mouth daily  ALTA Drake   carvedilol (COREG) 6.25 MG tablet Take 1 tablet by mouth 2 times daily  Harshil Parada MD   rizatriptan (MAXALT) 10 MG tablet Take 1 tablet by mouth daily as needed for Migraine (may repeat x 1 after two hours, max 2 tabs / 24 hours) Abnormal -         Discharge []  None visible  [] Abnormal -    HENT:   [x] Normocephalic, atraumatic. [] Abnormal   [] Mouth/Throat: Mucous membranes are moist.     External Ears [x] Normal  [] Abnormal-     Neck: [x] No visualized mass     Pulmonary/Chest: [x] Respiratory effort normal.  [x] No visualized signs of difficulty breathing or respiratory distress        [] Abnormal-      Musculoskeletal:   [] Normal gait with no signs of ataxia         [] Normal range of motion of neck        [] Abnormal-       Neurological:        [] No Facial Asymmetry (Cranial nerve 7 motor function) (limited exam to video visit)          [] No gaze palsy        [] Abnormal-         Skin:        [x] No significant exanthematous lesions or discoloration noted on facial skin         [] Abnormal-            Psychiatric:       [x] Normal Affect [x] No Hallucinations        [] Abnormal-     Other pertinent observable physical exam findings-     Recent Results (from the past 672 hour(s))   Human papillomavirus (HPV) DNA probe thin prep high risk    Collection Time: 06/04/20  2:34 PM   Result Value Ref Range    HPV TYPE 16 Not Detected Not Detected    HPV TYPE 18 Not Detected Not Detected    HPVOH (OTHER TYPES) Not Detected Not Detected    HPV Comment See below          ASSESSMENT/PLAN:  1. Depression with anxiety  Increase Prozac to 20 mg daily  - FLUoxetine (PROZAC) 20 MG capsule; Take 1 capsule by mouth daily  Dispense: 30 capsule; Refill: 5    Blood pressure stable continue with current medication  Return in about 2 months (around 8/8/2020). Fidel King is a 45 y.o. female being evaluated by a Virtual Visit (video visit) encounter to address concerns as mentioned above. A caregiver was present when appropriate. Due to this being a TeleHealth encounter (During [facility]-23 public health emergency), evaluation of the following organ systems was limited: Vitals/Constitutional/EENT/Resp/CV/GI//MS/Neuro/Skin/Heme-Lymph-Imm.

## 2020-06-09 LAB
HPV COMMENT: NORMAL
HPV TYPE 16: NOT DETECTED
HPV TYPE 18: NOT DETECTED
HPVOH (OTHER TYPES): NOT DETECTED

## 2020-06-10 RX ORDER — LEVONORGESTREL AND ETHINYL ESTRADIOL 0.15-0.03
1 KIT ORAL DAILY
Qty: 91 TABLET | Refills: 0 | Status: SHIPPED | OUTPATIENT
Start: 2020-06-10 | End: 2020-09-28

## 2020-06-12 RX ORDER — METRONIDAZOLE 500 MG/1
500 TABLET ORAL 2 TIMES DAILY
Qty: 14 TABLET | Refills: 0 | Status: SHIPPED | OUTPATIENT
Start: 2020-06-12 | End: 2020-06-19

## 2020-06-12 RX ORDER — FLUCONAZOLE 150 MG/1
150 TABLET ORAL ONCE
Qty: 1 TABLET | Refills: 0 | Status: SHIPPED | OUTPATIENT
Start: 2020-06-12 | End: 2020-06-12

## 2020-06-18 ASSESSMENT — ENCOUNTER SYMPTOMS: ALLERGIC/IMMUNOLOGIC NEGATIVE: 1

## 2020-07-14 ENCOUNTER — OFFICE VISIT (OUTPATIENT)
Dept: OTOLARYNGOLOGY | Facility: CLINIC | Age: 45
End: 2020-07-14

## 2020-07-14 VITALS
BODY MASS INDEX: 28.41 KG/M2 | DIASTOLIC BLOOD PRESSURE: 91 MMHG | HEIGHT: 67 IN | WEIGHT: 181 LBS | SYSTOLIC BLOOD PRESSURE: 137 MMHG | HEART RATE: 81 BPM

## 2020-07-14 DIAGNOSIS — M79.18 MYOFASCIAL PAIN ON RIGHT SIDE: ICD-10-CM

## 2020-07-14 DIAGNOSIS — M26.621 ARTHRALGIA OF RIGHT TEMPOROMANDIBULAR JOINT: Primary | ICD-10-CM

## 2020-07-14 DIAGNOSIS — G43.909 MIGRAINE WITHOUT STATUS MIGRAINOSUS, NOT INTRACTABLE, UNSPECIFIED MIGRAINE TYPE: ICD-10-CM

## 2020-07-14 DIAGNOSIS — H93.13 TINNITUS, BILATERAL: ICD-10-CM

## 2020-07-14 PROCEDURE — 99203 OFFICE O/P NEW LOW 30 MIN: CPT | Performed by: OTOLARYNGOLOGY

## 2020-07-14 RX ORDER — FLUOXETINE HYDROCHLORIDE 40 MG/1
40 CAPSULE ORAL DAILY
COMMUNITY
Start: 2020-04-12 | End: 2021-09-24 | Stop reason: HOSPADM

## 2020-07-14 RX ORDER — TRAZODONE HYDROCHLORIDE 50 MG/1
50-100 TABLET ORAL NIGHTLY PRN
COMMUNITY
Start: 2020-05-12 | End: 2021-09-24 | Stop reason: HOSPADM

## 2020-07-14 RX ORDER — CARISOPRODOL 350 MG/1
350 TABLET ORAL EVERY MORNING
Qty: 30 TABLET | Refills: 1 | Status: SHIPPED | OUTPATIENT
Start: 2020-07-14 | End: 2021-08-27

## 2020-07-14 RX ORDER — ONDANSETRON 4 MG/1
TABLET, FILM COATED ORAL
Status: ON HOLD | COMMUNITY
Start: 2020-02-06 | End: 2021-09-10

## 2020-07-14 RX ORDER — CARVEDILOL 6.25 MG/1
6.25 TABLET ORAL 2 TIMES DAILY WITH MEALS
COMMUNITY
Start: 2020-05-07 | End: 2021-09-24 | Stop reason: HOSPADM

## 2020-07-14 RX ORDER — CLONAZEPAM 0.5 MG/1
0.5 TABLET ORAL NIGHTLY PRN
Qty: 15 TABLET | Refills: 0 | Status: SHIPPED | OUTPATIENT
Start: 2020-07-14 | End: 2021-08-27

## 2020-07-14 RX ORDER — METOPROLOL SUCCINATE 50 MG/1
50 TABLET, EXTENDED RELEASE ORAL DAILY
COMMUNITY
Start: 2020-04-15 | End: 2020-07-14

## 2020-07-14 RX ORDER — LEVONORGESTREL AND ETHINYL ESTRADIOL 0.15-0.03
1 KIT ORAL DAILY
Status: ON HOLD | COMMUNITY
Start: 2020-06-11 | End: 2021-09-10

## 2020-07-14 RX ORDER — FLUTICASONE PROPIONATE 50 MCG
2 SPRAY, SUSPENSION (ML) NASAL DAILY
COMMUNITY
Start: 2019-09-09 | End: 2021-09-24 | Stop reason: HOSPADM

## 2020-07-14 NOTE — PROGRESS NOTES
Lisa Ambrocio LPN   Patient Intake Note    Review of Systems  Review of Systems   Constitutional: Negative for appetite change, chills and fatigue.   HENT:        See HPI   Respiratory: Negative for cough, choking and shortness of breath.    Gastrointestinal: Negative for diarrhea, nausea and vomiting.   Skin: Negative for rash.   Neurological: Positive for headaches. Negative for dizziness and light-headedness.   Psychiatric/Behavioral: Negative for sleep disturbance.       Tobacco Use: Screening and Cessation Intervention  Social History    Tobacco Use      Smoking status: Never Smoker      Smokeless tobacco: Never Used        Lisa Ambrocio LPN  7/14/2020  13:17    Answers for HPI/ROS submitted by the patient on 7/12/2020   What is the primary reason for your visit?: Other  Please describe your symptoms.: Jaw pain, TMJ?, Ringing ears.  Have you had these symptoms before?: Yes  How long have you been having these symptoms?: Greater than 2 weeks  Please list any medications you are currently taking for this condition.: Prozac  Please describe any probable cause for these symptoms. : Chronix mixed migraines

## 2020-07-14 NOTE — PATIENT INSTRUCTIONS
TEMPOROMANDIBULAR JOINT EXERCISES     The temporomandibular joint, or the TMJ, is the jaw joint. This joint can frequently be a source of pain in the head and neck region. Typically because of its location, pain is felt either in area just in front of the ear, or in the ear itself. However, pain in the TMJ can be referred to any part of the head and neck.     An examination by the physician frequently reveals tenderness around the joint. Oftentimes, a crack or pop can also be felt. This may be an indication that the pain is in all actuality coming from the joint. An exhaustive search for a cause is carried out, in an effort to determine the etiology of the pain. Pain can be caused by grinding of teeth at night (bruxism), overuse (gum chewing, ice cracking, over opening mouth), poor dentition and malocclusion (bad bite). In an attempt to be thorough, your physician may involve others who have experience in this field, such as oral surgeons, dentists and pain experts.     Should you be diagnosed with TMJ pain, a course of conservative treatment is indicated, as this works in an overwhelming majority of cases. Because this pain originates from a joint, the treatment is similar to treatment for pain in other joints.  Treatment     Rest:  This is indicated to relieve the pressure on the joint. No chewing gum, tough meat, hard bread, cracking ice in the teeth, or any other activity that places undue stress on the joint. Simply, if it causes it to hurt, stop doing it. This may be required for an unspecified period of time, usually 1 to 2 weeks.     Cold to the area:  This will reduce swelling and pain early in the course.     Heat to the area:  This improves blood flow to the area and speeds healing.     Pain Relievers:  Anti-inflammatory medications, such as aspirin, Motrin, Advil, Nuprin, Aleve or any other products in this category are satisfactory to use in the face of joint pain. Rarely are narcotics required, except  possibly in the acute phase to gain some initial relief.  Dr. Rodriguez may administer pain blocks to relieve severe pain and spasm. Nerve blocks may also be used.    Recommendations:  ?       Reduce/eliminate caffeinated drinks  ?       Reduce high sugar drinks and foods  ?       Get plenty of rest  ?       Practice relaxation techniques; Yoga, Biofeedback, Claude Chi, etc.  ?       Exercise for overall fitness  ?       Eat healthy- High fiber, low fat/cholesterol eating, and change eating habits. We recommend Sugar Busters as a lifestyle change for eating.  ?       See your dentist regularly for checkups and bite checks.  Rehabilitation:  Once the acute pain has been controlled, you should begin exercises to strengthen and rehabilitate the TMJ.     Exercises: These should be performed for five minutes at least five times each day     Slowly open the mouth to its fullest extent, even using the fingers to exert gentle pressure.     Open and close the mouth as widely and rapidly as possible.     Open and close the mouth against resistance by placing the open palm underneath the chin, or the fingers on top of the chin.     Move the lower jaw side to side without resistance. Later, add resistance by placing both hands on either side of the jaw.     Push (protrude) the lower jaw without resistance. Later add resistance.     Should the above regimen fail to lead to improvement, your physician will discuss with you other forms of therapy. Since almost all types of TMJ pain respond to conservative therapy, surgery is indicated only after exhausting the rehabilitation. Dr. Rodriguez does not perform rehabilitative surgery on the temporomandibular joint, but refers patients to an oral surgery who specialize in this type of surgery.    Heat to Jaws  Aleve 1 tablet 2 times daily  Konopin at bedtime for 2 weeks  Soma in the morning    TINNITUS  Tinnitus (latin for ringing) is the sensation of noise in the ears. This symptom can be  quite variable and disconcerting. Most people have had ringing in the ears but most do not require treatment. Only 6% of people have ringing troubling enough to seek treatment.    Symptoms can range from ringing to “noise” of any sort in the ear or ears. Timing can vary as well. There are no specific symptom requirements to have tinnitus. It is speculated that the inner ear hair cells (responsible for hearing) may be dying and causing the brain to seek additional input for sound that is missing. There are many theories for the etiology of tinnitus or ringing.    You may be referred for hearing testing or imaging of the ears/brain to try to determine what is causing ringing and how to best treat the condition.    Tinnitus Recommendations-  >Avoid loud or sudden noise  >Wear hearing protection in the presence of loud noise  >Avoid irritants like caffeine, nicotine, tonic water, malaria medications, alcohol, aspirin- please tell MD if you are taking any of these medications  >In a quiet environment or while sleeping, use a white noise generator or radio station to provide background noise  >Relaxation and stress management techniques are useful  >Biofeedback  >Complementary medicine may be of benefit  >Wear a hearing aid or tinnitus masker/retrainer  >Psychological counseling may be beneficial in some situations  >Exercise!!  >Restorative Sleep!!    Medical therapy for ringing (may include)-  Do nothing  Medications for ringing  IV medication  Ear perfusion- inner ear surgery to reduce ringing  Hearing Aids, Tinnitus maskers, Tinnitus retrainers  Biofeedback  Psychological counseling    All options will be reviewed at your visit. Treating tinnitus can be difficult and frustrating. There really is no cure but rather control. This can be time consuming to treat. The patient determines how much treatment is warranted if there are no associated medical conditions requiring therapy. Please be patient.    Mouthguard- from  [employer]  Dental or Oral surgery referral referral     [email or web address]

## 2020-07-14 NOTE — PROGRESS NOTES
Vijay Rodriguez Jr, MD     ENT NEW PATIENT NOTE     Chief Complaint   Patient presents with   • Earache     jaw pain, right        HISTORY OF PRESENT ILLNESS:  Accompanied by:   No one  Namita Zabala is a  45 y.o. female with R TMJ.  She has seen dentist and has been given guard. This was years ago. Dentist noted cracks in teeth.  She is supposed to see oral surgery. PCP told her to see ENT prior to OMFS.  She says present for 1.5 yrs. Began with popping, pain and difficulty chewing. She has constant pain.  She has chronic migraines, greater than 20 yrs.  PCP has started no therapy. Dentist has referred to ENT or OMFS.  Smoke- none  Drink- none  Seen at [facility] for migraines. Sees Neuro q 6 months. Trigger point injections every 3 months.  Ears- pain with bad Migraine, ice pick pain. She has ringing AU  Ringing- Whispering ringing AU. Began 6 months to a year. Prozac to treat ringing.    Review of Systems  Reviewed per patient intake note and confirmed with patient    Past History:  Past Medical History:   Diagnosis Date   • Angina at rest (CMS/HCC)    • Migraine     Sees Pain Management for injections.    • MVP (mitral valve prolapse)    • Syncope      Past Surgical History:   Procedure Laterality Date   • BREAST BIOPSY Right 2011    benign   • TONSILLECTOMY       Family History   Problem Relation Age of Onset   • Colon cancer Maternal Aunt 52   • Fibromyalgia Mother    • Heart disease Mother    • Hypertension Mother    • Hodgkin's lymphoma Paternal Grandmother    • Ovarian cancer Neg Hx    • Breast cancer Neg Hx      Social History     Tobacco Use   • Smoking status: Never Smoker   • Smokeless tobacco: Never Used   Substance Use Topics   • Alcohol use: No   • Drug use: No     Outpatient Medications Marked as Taking for the 7/14/20 encounter (Office Visit) with Vijay Rodriguez Jr., MD   Medication Sig Dispense Refill   • carvedilol (COREG) 6.25 MG tablet Take 6.25 mg by mouth.     • FLUoxetine (PROzac)  20 MG capsule Take 20 mg by mouth Daily.     • fluticasone (FLONASE) 50 MCG/ACT nasal spray 2 sprays.     • ondansetron (ZOFRAN) 4 MG tablet TAKE 1 TABLET BY MOUTH EVERY 8 HOURS AS NEEDED FOR NAUSEA       Allergies:  Penicillins        Vital Signs:   Heart Rate:  [81] 81  BP: (137)/(91) 137/91  EXAMINATION:  CONSTITUTIONAL:    well nourished, well-developed, alert, oriented, in no acute distress     BODY HABITUS:    Normal body habitus    COMMUNICATION:    able to communicate normally, normal voice quality    HEAD:     Normocephalic, without obvious abnormality, atraumatic    FACE:    structure normal, no tenderness present, no lesions/masses, no evidence of trauma    SALIVARY GLANDS:    parotid glands with no tenderness, no swelling, no masses, submandibular glands with normal size, nontender     EYE:    ocular motility normal, eyelids normal, orbits normal, no proptosis, conjunctiva clear, sclera non-icteric, pupils equal, round, reactive to light and accomodation  Color:   brown    HEARING:    response to conversational voice normal    EARS:    Otoscopic exam   normal pinna with no lesions, Canals normal size and shape, Tympanic membranes normal, Ossicular chain intact, Middle ear clear     NOSE EXTERNAL:    APPEARANCE: normal, straight, with good projection, no tenderness, no lesions, no tenderness, good nasal support, patent nares    NOSE INTERNAL:    Anterior rhinoscopy  intact mucosa, normal inferior turbinates, septum midline without perforation, secretions clear and normal amount, nares patent, normal nasal airway without obstruction, no lesions    ORAL CAVITY:    Normal lips with no lesions, dentition normal for age, FOM intact without lesions and normal salivary flow, Mucosa intact without lesions, Hard and soft palate normal without lesions    OROPHARYNX:    Direct examination  oropharyngeal mucosa normal, tonsil(s) with normal appearance      NECK:    normal appearance, no masses, no lesions, larynx  normal mobility, trachea midline    LYMPH NODES :    no adenopathy    THYROID:    no overt thyromegaly, no tenderness, nodules or mass present on palpation, position midline    CHEST/RESPIRATORY:    respiratory effort normal, no rales, rubs or wheezing, no stridor, normal appearance to chest    CARDIOVASCULAR:    regular rate and rhythm, no murmurs, gallups, no peripheral edema    NEURO/PSYCHIATRIC :      orientation- normal    mood- mildly depressive    affect- normal    Attention- normal    Fund of Knowledge- normal    Level of Consciousness- normal    Speech- normal  CRANIAL NERVE: normal  Grossly intact  GAIT:   Normal     TMJ EXAM:  Tenderness:     Left- min tenderness, Right- Severe tenderness  Opening: to approx 18 mm    Deviation Right  Clicking/Popping:     Bilateral- none   Pain radiation:     Right- into digastric  OCCLUSION:    Class I:     RESULTS REVIEW:    I have reviewed the patients old records in the chart.           ASSESSMENT:     Diagnosis Plan   1. Arthralgia of right temporomandibular joint  clonazePAM (KlonoPIN) 0.5 MG tablet    carisoprodol (Soma) 350 MG tablet    Severe   2. Migraine without status migrainosus, not intractable, unspecified migraine type     3. Tinnitus, bilateral  Comprehensive Hearing Test    Probably not TMJ rlated   4. Myofascial pain on right side  clonazePAM (KlonoPIN) 0.5 MG tablet    carisoprodol (Soma) 350 MG tablet    Mastication           PLAN:      Conservative management.  Patient has severe R sided TMJ. She may need OMFS evaluation. I fel dentist needs to remake splint at this time. I will try to break pain cycle with medications. Discussed ENT role in TMJ with patient with known diagnosis.  I will evaluate for tinnitus.  Tinnitus recommendations  TMJ recommendations  Klonopin 1/2 tablet at bedtime- trial  Soma- trial rather than Tizanidine  Mouthguard  Dental referral  Migraine control  MY CHART:  Patient is Patient is using My Chart  New Medications Ordered  This Visit   Medications   • clonazePAM (KlonoPIN) 0.5 MG tablet     Sig: Take 1 tablet by mouth At Night As Needed for Seizures. 1/2 tablet at bedtime for 2 weeks     Dispense:  15 tablet     Refill:  0   • carisoprodol (Soma) 350 MG tablet     Sig: Take 1 tablet by mouth Every Morning.     Dispense:  30 tablet     Refill:  1     Orders Placed This Encounter   Procedures   • Comprehensive Hearing Test          Patient understand(s) and agree(s) with the treatment plan as described.  Return After Audio, for Recheck TMJ and Tinnitus.       Vijay Rodriguez Jr, MD  07/14/20  13:39

## 2020-08-11 ENCOUNTER — TELEMEDICINE (OUTPATIENT)
Dept: FAMILY MEDICINE CLINIC | Age: 45
End: 2020-08-11
Payer: COMMERCIAL

## 2020-08-11 PROCEDURE — G8428 CUR MEDS NOT DOCUMENT: HCPCS | Performed by: FAMILY MEDICINE

## 2020-08-11 PROCEDURE — 99213 OFFICE O/P EST LOW 20 MIN: CPT | Performed by: FAMILY MEDICINE

## 2020-08-11 RX ORDER — ESZOPICLONE 3 MG/1
TABLET, FILM COATED ORAL
Qty: 30 TABLET | Refills: 2 | Status: SHIPPED | OUTPATIENT
Start: 2020-08-11 | End: 2020-12-05 | Stop reason: SDUPTHER

## 2020-08-11 RX ORDER — FLUOXETINE HYDROCHLORIDE 40 MG/1
40 CAPSULE ORAL DAILY
Qty: 30 CAPSULE | Refills: 5 | Status: SHIPPED | OUTPATIENT
Start: 2020-08-11 | End: 2020-12-29 | Stop reason: SDUPTHER

## 2020-08-11 RX ORDER — TRAZODONE HYDROCHLORIDE 50 MG/1
TABLET ORAL
Qty: 180 TABLET | Refills: 2 | Status: SHIPPED | OUTPATIENT
Start: 2020-08-11 | End: 2021-05-14 | Stop reason: SDUPTHER

## 2020-08-11 NOTE — PROGRESS NOTES
2020    TELEHEALTH EVALUATION -- Audio/Visual (During VZXFN-48 public health emergency)    HPI:    Jackie Tejeda (:  1975) has requested an audio/video evaluation for the following concern(s):    Hypertension  Compliant with medications. No adverse effects from medication. No lightheadedness, palpitations, or chest pain. Blood pressure less than 130/70    Migraine Headaches  Headaches are currently stable. Satisfied with current medication without side effects. Insomnia  Currently stable. Satisfied with current treatment regimen. No adverse effects from medication. Depression with Anxiety  Compliant with medications. No adverse effects from medication. Not noticing much improvement in her depression symptoms. Review of Systems  Review of Systems   Constitutional: Negative for chills and fever. HENT: Negative for congestion. Respiratory: Negative for cough, chest tightness and shortness of breath. Cardiovascular: Negative for chest pain, palpitations and leg swelling. Gastrointestinal: Negative for abdominal pain, anal bleeding, constipation, diarrhea and nausea. Genitourinary: Negative for difficulty urinating. Psychiatric/Behavioral: Negative. Prior to Visit Medications    Medication Sig Taking?  Authorizing Provider   FLUoxetine (PROZAC) 40 MG capsule Take 1 capsule by mouth daily Yes Lakeshia Malik MD   traZODone (DESYREL) 50 MG tablet Take 1-2 tablets at bedtime as need for sleep Yes Lakeshia Malik MD   eszopiclone (LUNESTA) 3 MG TABS TAKE 1 TABLET BY MOUTH EVERY NIGHT Yes Lakeshia Malik MD   levonorgestrel-ethinyl estradiol (INTROVALE) 0.15-0.03 MG per tablet Take 1 tablet by mouth daily  ALTA Nye   carvedilol (COREG) 6.25 MG tablet Take 1 tablet by mouth 2 times daily  Lakeshia Malik MD   rizatriptan (MAXALT) 10 MG tablet Take 1 tablet by mouth daily as needed for Migraine (may repeat x 1 after two hours, max 2 tabs / 24 hours) May repeat in 2 hours if needed  Jocy Lechuga,    hydrOXYzine (VISTARIL) 25 MG capsule Take 1 capsule by mouth 3 times daily as needed for Anxiety  ALTA Franz   tiZANidine (ZANAFLEX) 4 MG capsule TAKE 1 CAPSULE BY MOUTH EVERY 12 HOURS AS NEEDED FOR HEADACHE  ALTA Franz   ondansetron (ZOFRAN) 4 MG tablet TAKE 1 TABLET BY MOUTH EVERY 8 HOURS AS NEEDED FOR NAUSEA  ALTA Franz   fluticasone (FLONASE) 50 MCG/ACT nasal spray 2 sprays by Each Nostril route daily  ALTA Alatorre   butalbital-acetaminophen-caffeine (FIORICET, ESGIC) -40 MG per tablet Take 1 tablet by mouth as needed  Historical Provider, MD   Erenumab-aooe (AIMOVIG 140 DOSE) 79 MG/ML SOAJ   Historical Provider, MD   Bupivacaine HCl (LOCAL NERVE BLOCK SYRINGE, WITHOUT EPINEPHRINE,) 30 mLs by Infiltration route every 3 months Indications: Occipital nerve block shot  Historical Provider, MD   Bledsoe 500 MG TABS Take by mouth  Historical Provider, MD   Oxymetazoline HCl (AFRIN 12 HOUR NA) by Nasal route as needed  Historical Provider, MD       Social History     Tobacco Use    Smoking status: Never Smoker    Smokeless tobacco: Never Used   Substance Use Topics    Alcohol use: Yes     Comment: rare    Drug use: No            PHYSICAL EXAMINATION:  [ INSTRUCTIONS:  \"[x]\" Indicates a positive item  \"[]\" Indicates a negative item  -- DELETE ALL ITEMS NOT EXAMINED]  Vital Signs: (As obtained by patient/caregiver or practitioner observation)    Blood pressure-  Heart rate-    Respiratory rate-    Temperature-  Pulse oximetry-     Constitutional: [x] Appears well-developed and well-nourished [x] No apparent distress      [] Abnormal-   Mental status  [x] Alert and awake  [] Oriented to person/place/time [x]Able to follow commands      Eyes:  EOM    [x]  Normal  [] Abnormal-  Sclera  [x]  Normal  [] Abnormal -         Discharge []  None visible  [] Abnormal -    HENT:   [x] Normocephalic, atraumatic. [] Abnormal   [] Mouth/Throat: Mucous membranes are moist.     External Ears [x] Normal  [] Abnormal-     Neck: [x] No visualized mass     Pulmonary/Chest: [x] Respiratory effort normal.  [x] No visualized signs of difficulty breathing or respiratory distress        [] Abnormal-      Musculoskeletal:   [] Normal gait with no signs of ataxia         [] Normal range of motion of neck        [] Abnormal-       Neurological:        [] No Facial Asymmetry (Cranial nerve 7 motor function) (limited exam to video visit)          [] No gaze palsy        [] Abnormal-         Skin:        [x] No significant exanthematous lesions or discoloration noted on facial skin         [] Abnormal-            Psychiatric:       [x] Normal Affect [x] No Hallucinations        [] Abnormal-     Other pertinent observable physical exam findings-     No results found for this or any previous visit (from the past 672 hour(s)). ASSESSMENT/PLAN:  1. Depression with anxiety   Increase Prozac to 40 mg daily- FLUoxetine (PROZAC) 40 MG capsule; Take 1 capsule by mouth daily  Dispense: 30 capsule; Refill: 5    2. Primary insomnia  Refill the following medications  - traZODone (DESYREL) 50 MG tablet; Take 1-2 tablets at bedtime as need for sleep  Dispense: 180 tablet; Refill: 2  - eszopiclone (LUNESTA) 3 MG TABS; TAKE 1 TABLET BY MOUTH EVERY NIGHT  Dispense: 30 tablet; Refill: 2      Return in about 3 months (around 11/11/2020) for Routine follow up - 15 minute visit, In Office. Mike Sultana is a 39 y.o. female being evaluated by a Virtual Visit (video visit) encounter to address concerns as mentioned above. A caregiver was present when appropriate. Due to this being a TeleHealth encounter (During AODAO-06 public health emergency), evaluation of the following organ systems was limited: Vitals/Constitutional/EENT/Resp/CV/GI//MS/Neuro/Skin/Heme-Lymph-Imm.   Pursuant to the emergency declaration under the [address] Emergencies Act, 1135 waiver authority and the Coronavirus Preparedness and Response Supplemental Appropriations Act, this Virtual Visit was conducted with patient's (and/or legal guardian's) consent, to reduce the patient's risk of exposure to COVID-19 and provide necessary medical care. The patient (and/or legal guardian) has also been advised to contact this office for worsening conditions or problems, and seek emergency medical treatment and/or call 911 if deemed necessary. Patient identification was verified at the start of the visit: Yes    Total time spent on this encounter: Not billed by time    Services were provided through a video synchronous discussion virtually to substitute for in-person clinic visit. Patient and provider were located at their individual homes. --Joshua Wells MD on 8/11/2020 at 7:24 PM    An electronic signature was used to authenticate this note.

## 2020-08-18 ENCOUNTER — OFFICE VISIT (OUTPATIENT)
Dept: OTOLARYNGOLOGY | Facility: CLINIC | Age: 45
End: 2020-08-18

## 2020-08-18 ENCOUNTER — PROCEDURE VISIT (OUTPATIENT)
Dept: OTOLARYNGOLOGY | Facility: CLINIC | Age: 45
End: 2020-08-18

## 2020-08-18 VITALS — BODY MASS INDEX: 28.41 KG/M2 | WEIGHT: 181 LBS | HEIGHT: 67 IN

## 2020-08-18 DIAGNOSIS — H93.13 TINNITUS OF BOTH EARS: Primary | ICD-10-CM

## 2020-08-18 DIAGNOSIS — M26.621 ARTHRALGIA OF RIGHT TEMPOROMANDIBULAR JOINT: Primary | ICD-10-CM

## 2020-08-18 DIAGNOSIS — H93.13 TINNITUS, BILATERAL: ICD-10-CM

## 2020-08-18 DIAGNOSIS — M79.18 MYOFASCIAL PAIN ON RIGHT SIDE: ICD-10-CM

## 2020-08-18 DIAGNOSIS — G43.909 MIGRAINE WITHOUT STATUS MIGRAINOSUS, NOT INTRACTABLE, UNSPECIFIED MIGRAINE TYPE: ICD-10-CM

## 2020-08-18 PROCEDURE — 99213 OFFICE O/P EST LOW 20 MIN: CPT | Performed by: OTOLARYNGOLOGY

## 2020-08-18 NOTE — PATIENT INSTRUCTIONS
TEMPOROMANDIBULAR JOINT EXERCISES     The temporomandibular joint, or the TMJ, is the jaw joint. This joint can frequently be a source of pain in the head and neck region. Typically because of its location, pain is felt either in area just in front of the ear, or in the ear itself. However, pain in the TMJ can be referred to any part of the head and neck.     An examination by the physician frequently reveals tenderness around the joint. Oftentimes, a crack or pop can also be felt. This may be an indication that the pain is in all actuality coming from the joint. An exhaustive search for a cause is carried out, in an effort to determine the etiology of the pain. Pain can be caused by grinding of teeth at night (bruxism), overuse (gum chewing, ice cracking, over opening mouth), poor dentition and malocclusion (bad bite). In an attempt to be thorough, your physician may involve others who have experience in this field, such as oral surgeons, dentists and pain experts.     Should you be diagnosed with TMJ pain, a course of conservative treatment is indicated, as this works in an overwhelming majority of cases. Because this pain originates from a joint, the treatment is similar to treatment for pain in other joints.  Treatment     Rest:  This is indicated to relieve the pressure on the joint. No chewing gum, tough meat, hard bread, cracking ice in the teeth, or any other activity that places undue stress on the joint. Simply, if it causes it to hurt, stop doing it. This may be required for an unspecified period of time, usually 1 to 2 weeks.     Cold to the area:  This will reduce swelling and pain early in the course.     Heat to the area:  This improves blood flow to the area and speeds healing.     Pain Relievers:  Anti-inflammatory medications, such as aspirin, Motrin, Advil, Nuprin, Aleve or any other products in this category are satisfactory to use in the face of joint pain. Rarely are narcotics required, except  possibly in the acute phase to gain some initial relief.  Dr. Rodriguez may administer pain blocks to relieve severe pain and spasm. Nerve blocks may also be used.    Recommendations:  ?       Reduce/eliminate caffeinated drinks  ?       Reduce high sugar drinks and foods  ?       Get plenty of rest  ?       Practice relaxation techniques; Yoga, Biofeedback, Claude Chi, etc.  ?       Exercise for overall fitness  ?       Eat healthy- High fiber, low fat/cholesterol eating, and change eating habits. We recommend Sugar Busters as a lifestyle change for eating.  ?       See your dentist regularly for checkups and bite checks.  Rehabilitation:  Once the acute pain has been controlled, you should begin exercises to strengthen and rehabilitate the TMJ.     Exercises: These should be performed for five minutes at least five times each day     Slowly open the mouth to its fullest extent, even using the fingers to exert gentle pressure.     Open and close the mouth as widely and rapidly as possible.     Open and close the mouth against resistance by placing the open palm underneath the chin, or the fingers on top of the chin.     Move the lower jaw side to side without resistance. Later, add resistance by placing both hands on either side of the jaw.     Push (protrude) the lower jaw without resistance. Later add resistance.     Should the above regimen fail to lead to improvement, your physician will discuss with you other forms of therapy. Since almost all types of TMJ pain respond to conservative therapy, surgery is indicated only after exhausting the rehabilitation. Dr. Rodriguez does not perform rehabilitative surgery on the temporomandibular joint, but refers patients to an oral surgery who specialize in this type of surgery.    See Pain management for injections in R mastication muscles and pain medication    Klonopin until sees pain management    Stop Soma in the AM     [email or web address]

## 2020-08-18 NOTE — PROGRESS NOTES
Vijay Rodriguez Jr, MD     ENT FOLLOW UP NOTE     Chief Complaint   Patient presents with   • Follow-up     TMJ        HISTORY OF PRESENT ILLNESS:  Accompanied by:  No one  Namita Zabala is a  45 y.o. female who is here for follow up. She says she is not any better.  She has problems with Soma. May affect BP. She will pass out with BP.  Klonopin does help her sleep more soundly.  Still has Jaw pain. May be slightly better. Can open mouth a little better.  She is to see Dentist Thurs.  She is suffering from stress, anxiety and depression. She feels bad every day.  She is seeing pain management.  She is not well controlled at all.  Brux- she continues to grind. She finds herself grinding at all times.    Review of Systems   Constitutional: Negative for appetite change, fatigue and fever.   HENT:        See HPI   Respiratory: Negative for cough, choking and shortness of breath.    Gastrointestinal: Negative for diarrhea, nausea and vomiting.   Skin: Negative for rash.   Neurological: Negative for dizziness, light-headedness and headaches.   Psychiatric/Behavioral: Negative for sleep disturbance.         Past History:  Past medical and surgical history, family history and social history reviewed and updated when appropriate.  Current medications and allergies reviewed and updated when appropriate.  Allergies:  Penicillins        Vital Signs:      EXAMINATION:  CONSTITUTIONAL:    well nourished, well-developed, alert, oriented, in no acute distress      BODY HABITUS:    Normal body habitus     COMMUNICATION:    able to communicate normally, normal voice quality     HEAD:     Normocephalic, without obvious abnormality, atraumatic     FACE:    structure normal, no tenderness present, no lesions/masses, no evidence of trauma     SALIVARY GLANDS:    parotid glands with no tenderness, no swelling, no masses, submandibular glands with normal size, nontender      EYE:    ocular motility normal, eyelids normal, orbits  normal, no proptosis, conjunctiva clear, sclera non-icteric, pupils equal, round, reactive to light and accomodation  Color:   brown     HEARING:    response to conversational voice normal     EARS:    Otoscopic exam   normal pinna with no lesions, Canals normal size and shape, Tympanic membranes normal, Ossicular chain intact, Middle ear clear     NOSE EXTERNAL:    APPEARANCE: normal, straight, with good projection, no tenderness, no lesions, no tenderness, good nasal support, patent nares     NOSE INTERNAL:    Anterior rhinoscopy  intact mucosa, normal inferior turbinates, septum midline without perforation, secretions clear and normal amount, nares patent, normal nasal airway without obstruction, no lesions     ORAL CAVITY:    Normal lips with no lesions, dentition normal for age, FOM intact without lesions and normal salivary flow, Mucosa intact without lesions, Hard and soft palate normal without lesions     OROPHARYNX:    Direct examination  oropharyngeal mucosa normal, tonsil(s) with normal appearance       NECK:    normal appearance, no masses, no lesions, larynx normal mobility, trachea midline     LYMPH NODES :    no adenopathy     THYROID:    no overt thyromegaly, no tenderness, nodules or mass present on palpation, position midline     CHEST/RESPIRATORY:    respiratory effort normal, no rales, rubs or wheezing, no stridor, normal appearance to chest     CARDIOVASCULAR:    regular rate and rhythm, no murmurs, gallups, no peripheral edema     NEURO/PSYCHIATRIC :      orientation- normal    mood- mildly depressive    affect- normal    Attention- normal    Fund of Knowledge- normal    Level of Consciousness- normal    Speech- normal  CRANIAL NERVE: normal  Grossly intact  GAIT:   Normal      TMJ EXAM:  Tenderness:     Left- min tenderness, Right- Severe tenderness  Opening: to approx 18 mm    Deviation Right  Clicking/Popping:     Bilateral- none   Pain radiation:     Right- into digastric  OCCLUSION:     Class I:     RESULTS REVIEW:    I have reviewed the patients old records in the chart.           ASSESSMENT:      Diagnosis Plan   1. Arthralgia of right temporomandibular joint      Poor control  Dental referral   2. Migraine without status migrainosus, not intractable, unspecified migraine type      [place] treating   3. Tinnitus, bilateral      No change   4. Myofascial pain on right side             PLAN:      Continue current management plan.  Patient has poor control of cyclic pain.  I will refer to pain management and see if they will inject muscles involved in myofascial pain R side. I will continue the Klonopin until she sees pain management. Should she not resolve, I will consider TMJ injection. She is not breaking the cysle of pain so far. She has multiple facets.  I cannot get control with Klonopin. I will not add further medications.  Referral back to Neurology and Pain management for pain control of TMJ.  Dental referral  Klonopin at bedtime  Stop Soma because of BP side effects.  MY CHART:  Patient is Patient is using My Chart          Patient understand(s) and agree(s) with the treatment plan as described.    Return if symptoms worsen or fail to improve, for Recheck R TMJ, moyofascial pain.     Vijay Rodriguez Jr, MD  08/18/20  16:21

## 2020-08-18 NOTE — PROGRESS NOTES
"ENT PROCEDURE NOTE:  Anesthesia: { for procedures CMN:42049}    Endoscopy Type:  Nasal Endoscopic Debridement    Procedure Details:    The patient was placed in the sitting position. After topical anesthesia and decongestion,  a {Scope type CMN:41667} was passed along the nasal floor to the nasopharynx.  The scope was then passed to the middle and superior aspects of the nasal cavity. Systematic examination of the nasal cavity, nasopharynx and structures was performed.    Debridement was carried with endoscopic guidance using suction and nasal forceps    Findings:    {NOSE EXAMINATION (Optional):65494::\" \",\"Anterior rhinoscopy\",\"intact mucosa, normal inferior turbinates, septum midline without perforation, secretions clear and normal amount, nares patent, normal nasal airway without obstruction, no lesions\":1}  {SINUS EXAMINATION (Optional):98360::\" \":1}    Condition:  Stable.  Patient tolerated procedure well.    Complications:  None    Post-procedure instructions reviewed with {Discussion with CMN (Optional):10741}  Instructions documented in AVS for patient to review    "

## 2020-08-31 RX ORDER — CARVEDILOL 6.25 MG/1
TABLET ORAL
Qty: 60 TABLET | Refills: 3 | Status: SHIPPED | OUTPATIENT
Start: 2020-08-31 | End: 2020-12-19 | Stop reason: SDUPTHER

## 2020-09-21 ENCOUNTER — PATIENT MESSAGE (OUTPATIENT)
Dept: FAMILY MEDICINE CLINIC | Age: 45
End: 2020-09-21

## 2020-09-22 RX ORDER — RIZATRIPTAN BENZOATE 10 MG/1
10 TABLET ORAL DAILY PRN
Qty: 9 TABLET | Refills: 5 | Status: SHIPPED | OUTPATIENT
Start: 2020-09-22 | End: 2021-04-02

## 2020-09-22 NOTE — TELEPHONE ENCOUNTER
From: Ana Solitario  To: Isabell Sandoval MD  Sent: 9/21/2020 5:45 PM CDT  Subject: Prescription Question    I have ran out of & my insurance will not pay for my rizatriptan (Maxalt) for a couple of weeks. [address] [place] takes good rx and they have it for $13. Will you call me a prescription into there please?

## 2020-12-21 RX ORDER — CARVEDILOL 6.25 MG/1
6.25 TABLET ORAL 2 TIMES DAILY
Qty: 60 TABLET | Refills: 3 | Status: SHIPPED | OUTPATIENT
Start: 2020-12-21 | End: 2021-03-18 | Stop reason: SDUPTHER

## 2020-12-29 RX ORDER — FLUOXETINE HYDROCHLORIDE 40 MG/1
40 CAPSULE ORAL DAILY
Qty: 90 CAPSULE | Refills: 3 | Status: SHIPPED | OUTPATIENT
Start: 2020-12-29 | End: 2022-03-22 | Stop reason: SDUPTHER

## 2021-03-02 DIAGNOSIS — F51.01 PRIMARY INSOMNIA: ICD-10-CM

## 2021-03-03 RX ORDER — ESZOPICLONE 3 MG/1
TABLET, FILM COATED ORAL
Qty: 30 TABLET | Refills: 5 | Status: SHIPPED | OUTPATIENT
Start: 2021-03-03 | End: 2022-03-22 | Stop reason: SDUPTHER

## 2021-03-03 NOTE — TELEPHONE ENCOUNTER
Refilled medication and e-scribed to pharmacy. Confirm prescription was due. Make sure CORY and urine drug screen is up to date.

## 2021-03-03 NOTE — TELEPHONE ENCOUNTER
Henry Kinsey called to request a refill on her medication. Last office visit : 8/11/2020   Next office visit : Visit date not found     Last UDS:   Amphetamine Screen, Urine   Date Value Ref Range Status   11/15/2019 neg  Final     Barbiturate Screen, Urine   Date Value Ref Range Status   11/15/2019 neg  Final     Benzodiazepine Screen, Urine   Date Value Ref Range Status   11/15/2019 pos  Final     Buprenorphine Urine   Date Value Ref Range Status   11/15/2019 neg  Final     Cocaine Metabolite Screen, Urine   Date Value Ref Range Status   11/15/2019 neg  Final     Gabapentin Screen, Urine   Date Value Ref Range Status   11/15/2019 neg  Final     MDMA, Urine   Date Value Ref Range Status   11/15/2019 neg  Final     Methamphetamine, Urine   Date Value Ref Range Status   11/15/2019 neg  Final     Opiate Scrn, Ur   Date Value Ref Range Status   11/15/2019 neg  Final     Oxycodone Screen, Ur   Date Value Ref Range Status   11/15/2019 neg  Final     PCP Screen, Urine   Date Value Ref Range Status   11/15/2019 neg  Final     Propoxyphene Screen, Urine   Date Value Ref Range Status   11/15/2019 neg  Final     THC Screen, Urine   Date Value Ref Range Status   11/15/2019 neg  Final     Tricyclic Antidepressants, Urine   Date Value Ref Range Status   11/15/2019 neg  Final       Last Santo Rebekah: 12-7-20        Medication Contract: NOT ON FILE  Last Fill: 12-7-20    Requested Prescriptions     Pending Prescriptions Disp Refills    eszopiclone (LUNESTA) 3 MG TABS [Pharmacy Med Name: ESZOPICLONE 3MG TABLETS] 30 tablet      Sig: TAKE 1 TABLET BY MOUTH EVERY NIGHT         Please approve or refuse this medication.    Wade Weiner MA

## 2021-03-18 RX ORDER — CARVEDILOL 6.25 MG/1
6.25 TABLET ORAL 2 TIMES DAILY
Qty: 60 TABLET | Refills: 3 | Status: SHIPPED | OUTPATIENT
Start: 2021-03-18 | End: 2021-03-22 | Stop reason: SDUPTHER

## 2021-03-22 RX ORDER — CARVEDILOL 6.25 MG/1
6.25 TABLET ORAL 2 TIMES DAILY
Qty: 180 TABLET | Refills: 3 | Status: SHIPPED | OUTPATIENT
Start: 2021-03-22 | End: 2022-03-22 | Stop reason: ALTCHOICE

## 2021-04-02 RX ORDER — RIZATRIPTAN BENZOATE 10 MG/1
TABLET ORAL
Qty: 9 TABLET | Refills: 4 | Status: SHIPPED | OUTPATIENT
Start: 2021-04-02 | End: 2022-03-16 | Stop reason: SDUPTHER

## 2021-04-02 NOTE — TELEPHONE ENCOUNTER
Clarisse Sayra called to request a refill on her medication.       Last office visit : 8/11/2020   Next office visit : 4/1/2021     Requested Prescriptions     Pending Prescriptions Disp Refills    rizatriptan (MAXALT) 10 MG tablet [Pharmacy Med Name: RIZATRIPTAN 10 MG TABLET] 9 tablet 4     Sig: TAKE ONE TABLET BY MOUTH DAILY AS NEEDED FOR MIGRAINE ( MAY REPEAT ONE TIME AFTER 2 HOURS  IF NEEDED (MAX OF 2 TABLETS IN 24HOURS)            Joelle Roberts, MA

## 2021-05-14 DIAGNOSIS — F51.01 PRIMARY INSOMNIA: ICD-10-CM

## 2021-05-14 RX ORDER — TRAZODONE HYDROCHLORIDE 50 MG/1
TABLET ORAL
Qty: 180 TABLET | Refills: 2 | Status: SHIPPED | OUTPATIENT
Start: 2021-05-14

## 2021-05-14 NOTE — TELEPHONE ENCOUNTER
[place] Shannaethan called to request a refill on her medication.       Last office visit : 8/11/2020   Next office visit : Visit date not found     Requested Prescriptions     Pending Prescriptions Disp Refills    traZODone (DESYREL) 50 MG tablet 180 tablet 2     Sig: Take 1-2 tablets at bedtime as need for sleep            Roma Heredia LPN

## 2021-08-27 ENCOUNTER — APPOINTMENT (OUTPATIENT)
Dept: GENERAL RADIOLOGY | Facility: HOSPITAL | Age: 46
End: 2021-08-27

## 2021-08-27 ENCOUNTER — APPOINTMENT (OUTPATIENT)
Dept: CARDIOLOGY | Facility: HOSPITAL | Age: 46
End: 2021-08-27

## 2021-08-27 ENCOUNTER — HOSPITAL ENCOUNTER (INPATIENT)
Facility: HOSPITAL | Age: 46
LOS: 28 days | Discharge: NURSING, REHAB OR LONG-TERM CARE | End: 2021-09-24
Attending: EMERGENCY MEDICINE | Admitting: INTERNAL MEDICINE

## 2021-08-27 ENCOUNTER — APPOINTMENT (OUTPATIENT)
Dept: CT IMAGING | Facility: HOSPITAL | Age: 46
End: 2021-08-27

## 2021-08-27 DIAGNOSIS — I26.94 MULTIPLE SUBSEGMENTAL PULMONARY EMBOLI WITHOUT ACUTE COR PULMONALE (HCC): ICD-10-CM

## 2021-08-27 DIAGNOSIS — U07.1 COVID-19: Primary | ICD-10-CM

## 2021-08-27 DIAGNOSIS — F41.9 ANXIETY: ICD-10-CM

## 2021-08-27 DIAGNOSIS — R65.20 SEPSIS WITH ACUTE HYPOXIC RESPIRATORY FAILURE WITHOUT SEPTIC SHOCK, DUE TO UNSPECIFIED ORGANISM (HCC): ICD-10-CM

## 2021-08-27 DIAGNOSIS — Z78.9 DECREASED ACTIVITIES OF DAILY LIVING (ADL): ICD-10-CM

## 2021-08-27 DIAGNOSIS — J96.01 ACUTE RESPIRATORY FAILURE WITH HYPOXIA (HCC): ICD-10-CM

## 2021-08-27 DIAGNOSIS — J96.01 SEPSIS WITH ACUTE HYPOXIC RESPIRATORY FAILURE WITHOUT SEPTIC SHOCK, DUE TO UNSPECIFIED ORGANISM (HCC): ICD-10-CM

## 2021-08-27 DIAGNOSIS — Z74.09 IMPAIRED MOBILITY: ICD-10-CM

## 2021-08-27 DIAGNOSIS — N17.9 AKI (ACUTE KIDNEY INJURY) (HCC): ICD-10-CM

## 2021-08-27 DIAGNOSIS — A41.9 SEPSIS WITH ACUTE HYPOXIC RESPIRATORY FAILURE WITHOUT SEPTIC SHOCK, DUE TO UNSPECIFIED ORGANISM (HCC): ICD-10-CM

## 2021-08-27 DIAGNOSIS — J18.9 PNEUMONIA OF BOTH LUNGS DUE TO INFECTIOUS ORGANISM, UNSPECIFIED PART OF LUNG: ICD-10-CM

## 2021-08-27 DIAGNOSIS — R13.10 DYSPHAGIA, UNSPECIFIED TYPE: ICD-10-CM

## 2021-08-27 PROBLEM — I26.99 ACUTE PULMONARY EMBOLISM: Status: ACTIVE | Noted: 2021-08-27

## 2021-08-27 PROBLEM — IMO0002 CHRONIC MIGRAINE: Status: ACTIVE | Noted: 2021-08-27

## 2021-08-27 PROBLEM — E87.20 LACTIC ACIDOSIS: Status: ACTIVE | Noted: 2021-08-27

## 2021-08-27 PROBLEM — R74.01 ELEVATED TRANSAMINASE LEVEL: Status: ACTIVE | Noted: 2021-08-27

## 2021-08-27 PROBLEM — I34.1 MVP (MITRAL VALVE PROLAPSE): Status: ACTIVE | Noted: 2021-08-27

## 2021-08-27 LAB
ALBUMIN SERPL-MCNC: 2.7 G/DL (ref 3.5–5.2)
ALBUMIN SERPL-MCNC: 3.2 G/DL (ref 3.5–5.2)
ALBUMIN/GLOB SERPL: 0.9 G/DL
ALBUMIN/GLOB SERPL: 1 G/DL
ALP SERPL-CCNC: 67 U/L (ref 39–117)
ALP SERPL-CCNC: 70 U/L (ref 39–117)
ALT SERPL W P-5'-P-CCNC: 343 U/L (ref 1–33)
ALT SERPL W P-5'-P-CCNC: 426 U/L (ref 1–33)
ANION GAP SERPL CALCULATED.3IONS-SCNC: 12 MMOL/L (ref 5–15)
ANION GAP SERPL CALCULATED.3IONS-SCNC: 20 MMOL/L (ref 5–15)
ARTERIAL PATENCY WRIST A: ABNORMAL
ARTERIAL PATENCY WRIST A: POSITIVE
ARTERIAL PATENCY WRIST A: POSITIVE
AST SERPL-CCNC: 223 U/L (ref 1–32)
AST SERPL-CCNC: 296 U/L (ref 1–32)
ATMOSPHERIC PRESS: 751 MMHG
ATMOSPHERIC PRESS: 751 MMHG
ATMOSPHERIC PRESS: 753 MMHG
BACTERIA UR QL AUTO: ABNORMAL /HPF
BASE EXCESS BLDA CALC-SCNC: -1.8 MMOL/L (ref 0–2)
BASE EXCESS BLDA CALC-SCNC: -4.2 MMOL/L (ref 0–2)
BASE EXCESS BLDA CALC-SCNC: 0.1 MMOL/L (ref 0–2)
BDY SITE: ABNORMAL
BH CV ECHO MEAS - AO MAX PG (FULL): 1.4 MMHG
BH CV ECHO MEAS - AO MAX PG: 3.3 MMHG
BH CV ECHO MEAS - AO MEAN PG (FULL): 0 MMHG
BH CV ECHO MEAS - AO MEAN PG: 1 MMHG
BH CV ECHO MEAS - AO ROOT AREA (BSA CORRECTED): 1.6
BH CV ECHO MEAS - AO ROOT AREA: 7.1 CM^2
BH CV ECHO MEAS - AO ROOT DIAM: 3 CM
BH CV ECHO MEAS - AO V2 MAX: 90.4 CM/SEC
BH CV ECHO MEAS - AO V2 MEAN: 47.9 CM/SEC
BH CV ECHO MEAS - AO V2 VTI: 13.4 CM
BH CV ECHO MEAS - AVA(I,A): 2.5 CM^2
BH CV ECHO MEAS - AVA(I,D): 2.5 CM^2
BH CV ECHO MEAS - AVA(V,A): 2.4 CM^2
BH CV ECHO MEAS - AVA(V,D): 2.4 CM^2
BH CV ECHO MEAS - BSA(HAYCOCK): 2 M^2
BH CV ECHO MEAS - BSA: 1.9 M^2
BH CV ECHO MEAS - BZI_BMI: 28.2 KILOGRAMS/M^2
BH CV ECHO MEAS - BZI_METRIC_HEIGHT: 170.2 CM
BH CV ECHO MEAS - BZI_METRIC_WEIGHT: 81.6 KG
BH CV ECHO MEAS - EDV(CUBED): 80.6 ML
BH CV ECHO MEAS - EDV(MOD-SP4): 76.9 ML
BH CV ECHO MEAS - EDV(TEICH): 84 ML
BH CV ECHO MEAS - EF(CUBED): 77.7 %
BH CV ECHO MEAS - EF(MOD-SP4): 60.3 %
BH CV ECHO MEAS - EF(TEICH): 70.1 %
BH CV ECHO MEAS - ESV(CUBED): 18 ML
BH CV ECHO MEAS - ESV(MOD-SP4): 30.5 ML
BH CV ECHO MEAS - ESV(TEICH): 25.1 ML
BH CV ECHO MEAS - FS: 39.4 %
BH CV ECHO MEAS - IVS/LVPW: 1.1
BH CV ECHO MEAS - IVSD: 1.1 CM
BH CV ECHO MEAS - LA DIMENSION: 3 CM
BH CV ECHO MEAS - LA/AO: 1
BH CV ECHO MEAS - LAT PEAK E' VEL: 7.9 CM/SEC
BH CV ECHO MEAS - LV DIASTOLIC VOL/BSA (35-75): 39.8 ML/M^2
BH CV ECHO MEAS - LV MASS(C)D: 150.1 GRAMS
BH CV ECHO MEAS - LV MASS(C)DI: 77.6 GRAMS/M^2
BH CV ECHO MEAS - LV MAX PG: 1.8 MMHG
BH CV ECHO MEAS - LV MEAN PG: 1 MMHG
BH CV ECHO MEAS - LV SYSTOLIC VOL/BSA (12-30): 15.8 ML/M^2
BH CV ECHO MEAS - LV V1 MAX: 68 CM/SEC
BH CV ECHO MEAS - LV V1 MEAN: 39.9 CM/SEC
BH CV ECHO MEAS - LV V1 VTI: 10.7 CM
BH CV ECHO MEAS - LVIDD: 4.3 CM
BH CV ECHO MEAS - LVIDS: 2.6 CM
BH CV ECHO MEAS - LVLD AP4: 7.2 CM
BH CV ECHO MEAS - LVLS AP4: 6.7 CM
BH CV ECHO MEAS - LVOT AREA (M): 3.1 CM^2
BH CV ECHO MEAS - LVOT AREA: 3.1 CM^2
BH CV ECHO MEAS - LVOT DIAM: 2 CM
BH CV ECHO MEAS - LVPWD: 1 CM
BH CV ECHO MEAS - MED PEAK E' VEL: 6.9 CM/SEC
BH CV ECHO MEAS - MV A MAX VEL: 47.1 CM/SEC
BH CV ECHO MEAS - MV DEC SLOPE: 425 CM/SEC^2
BH CV ECHO MEAS - MV DEC TIME: 0.13 SEC
BH CV ECHO MEAS - MV E MAX VEL: 45.6 CM/SEC
BH CV ECHO MEAS - MV E/A: 0.97
BH CV ECHO MEAS - MV P1/2T MAX VEL: 69.2 CM/SEC
BH CV ECHO MEAS - MV P1/2T: 47.7 MSEC
BH CV ECHO MEAS - MVA P1/2T LCG: 3.2 CM^2
BH CV ECHO MEAS - MVA(P1/2T): 4.6 CM^2
BH CV ECHO MEAS - PA MAX PG: 3.1 MMHG
BH CV ECHO MEAS - PA V2 MAX: 88.2 CM/SEC
BH CV ECHO MEAS - RAP SYSTOLE: 5 MMHG
BH CV ECHO MEAS - RVSP: 46.7 MMHG
BH CV ECHO MEAS - SI(AO): 49 ML/M^2
BH CV ECHO MEAS - SI(CUBED): 32.4 ML/M^2
BH CV ECHO MEAS - SI(LVOT): 17.4 ML/M^2
BH CV ECHO MEAS - SI(MOD-SP4): 24 ML/M^2
BH CV ECHO MEAS - SI(TEICH): 30.5 ML/M^2
BH CV ECHO MEAS - SV(AO): 94.7 ML
BH CV ECHO MEAS - SV(CUBED): 62.6 ML
BH CV ECHO MEAS - SV(LVOT): 33.6 ML
BH CV ECHO MEAS - SV(MOD-SP4): 46.4 ML
BH CV ECHO MEAS - SV(TEICH): 58.9 ML
BH CV ECHO MEAS - TR MAX VEL: 323 CM/SEC
BH CV ECHO MEASUREMENTS AVERAGE E/E' RATIO: 6.16
BILIRUB SERPL-MCNC: 1 MG/DL (ref 0–1.2)
BILIRUB SERPL-MCNC: 1.1 MG/DL (ref 0–1.2)
BILIRUB UR QL STRIP: ABNORMAL
BODY TEMPERATURE: 37 C
BUN SERPL-MCNC: 22 MG/DL (ref 6–20)
BUN SERPL-MCNC: 24 MG/DL (ref 6–20)
BUN/CREAT SERPL: 18 (ref 7–25)
BUN/CREAT SERPL: 19.3 (ref 7–25)
CALCIUM SPEC-SCNC: 7.5 MG/DL (ref 8.6–10.5)
CALCIUM SPEC-SCNC: 8.7 MG/DL (ref 8.6–10.5)
CHLORIDE SERPL-SCNC: 107 MMOL/L (ref 98–107)
CHLORIDE SERPL-SCNC: 97 MMOL/L (ref 98–107)
CLARITY UR: ABNORMAL
CO2 SERPL-SCNC: 21 MMOL/L (ref 22–29)
CO2 SERPL-SCNC: 21 MMOL/L (ref 22–29)
COLOR UR: ABNORMAL
CREAT SERPL-MCNC: 1.14 MG/DL (ref 0.57–1)
CREAT SERPL-MCNC: 1.33 MG/DL (ref 0.57–1)
CRP SERPL-MCNC: 11.92 MG/DL (ref 0–0.5)
D DIMER PPP FEU-MCNC: >20 MG/L (FEU) (ref 0–0.5)
D-LACTATE SERPL-SCNC: 2.5 MMOL/L (ref 0.5–2)
D-LACTATE SERPL-SCNC: 7.1 MMOL/L (ref 0.5–2)
DEPRECATED RDW RBC AUTO: 41 FL (ref 37–54)
EOSINOPHIL # BLD MANUAL: 0.28 10*3/MM3 (ref 0–0.4)
EOSINOPHIL NFR BLD MANUAL: 1 % (ref 0.3–6.2)
EPAP: 6
EPAP: 8
ERYTHROCYTE [DISTWIDTH] IN BLOOD BY AUTOMATED COUNT: 13.1 % (ref 12.3–15.4)
FERRITIN SERPL-MCNC: 549.4 NG/ML (ref 13–150)
GFR SERPL CREATININE-BSD FRML MDRD: 43 ML/MIN/1.73
GFR SERPL CREATININE-BSD FRML MDRD: 52 ML/MIN/1.73
GLOBULIN UR ELPH-MCNC: 2.9 GM/DL
GLOBULIN UR ELPH-MCNC: 3.2 GM/DL
GLUCOSE BLDC GLUCOMTR-MCNC: 136 MG/DL (ref 70–130)
GLUCOSE BLDC GLUCOMTR-MCNC: 148 MG/DL (ref 70–130)
GLUCOSE BLDC GLUCOMTR-MCNC: 168 MG/DL (ref 70–130)
GLUCOSE SERPL-MCNC: 180 MG/DL (ref 65–99)
GLUCOSE SERPL-MCNC: 186 MG/DL (ref 65–99)
GLUCOSE UR STRIP-MCNC: NEGATIVE MG/DL
HBA1C MFR BLD: 5.9 % (ref 4.8–5.6)
HCG INTACT+B SERPL-ACNC: 0.12 MIU/ML
HCO3 BLDA-SCNC: 22.5 MMOL/L (ref 20–26)
HCO3 BLDA-SCNC: 22.5 MMOL/L (ref 20–26)
HCO3 BLDA-SCNC: 22.8 MMOL/L (ref 20–26)
HCT VFR BLD AUTO: 39.9 % (ref 34–46.6)
HGB BLD-MCNC: 13.9 G/DL (ref 12–15.9)
HGB UR QL STRIP.AUTO: ABNORMAL
HOLD SPECIMEN: NORMAL
HYALINE CASTS UR QL AUTO: ABNORMAL /LPF
INHALED O2 CONCENTRATION: 100 %
INHALED O2 CONCENTRATION: 100 %
INHALED O2 CONCENTRATION: 90 %
IPAP: 16
IPAP: 16
KETONES UR QL STRIP: ABNORMAL
LDH SERPL-CCNC: 1150 U/L (ref 135–214)
LEUKOCYTE ESTERASE UR QL STRIP.AUTO: ABNORMAL
LV EF 2D ECHO EST: 60 %
LYMPHOCYTES # BLD MANUAL: 2.24 10*3/MM3 (ref 0.7–3.1)
LYMPHOCYTES NFR BLD MANUAL: 6 % (ref 5–12)
LYMPHOCYTES NFR BLD MANUAL: 8 % (ref 19.6–45.3)
Lab: ABNORMAL
MAGNESIUM SERPL-MCNC: 2 MG/DL (ref 1.6–2.6)
MAXIMAL PREDICTED HEART RATE: 176 BPM
MCH RBC QN AUTO: 30.3 PG (ref 26.6–33)
MCHC RBC AUTO-ENTMCNC: 34.8 G/DL (ref 31.5–35.7)
MCV RBC AUTO: 87.1 FL (ref 79–97)
METAMYELOCYTES NFR BLD MANUAL: 1 % (ref 0–0)
MODALITY: ABNORMAL
MONOCYTES # BLD AUTO: 1.68 10*3/MM3 (ref 0.1–0.9)
MRSA DNA SPEC QL NAA+PROBE: NORMAL
NEUTROPHILS # BLD AUTO: 23.51 10*3/MM3 (ref 1.7–7)
NEUTROPHILS NFR BLD MANUAL: 81 % (ref 42.7–76)
NEUTS BAND NFR BLD MANUAL: 3 % (ref 0–5)
NITRITE UR QL STRIP: NEGATIVE
NRBC SPEC MANUAL: 3 /100 WBC (ref 0–0.2)
NT-PROBNP SERPL-MCNC: ABNORMAL PG/ML (ref 0–450)
PCO2 BLDA: 30.8 MM HG (ref 35–45)
PCO2 BLDA: 36 MM HG (ref 35–45)
PCO2 BLDA: 46.5 MM HG (ref 35–45)
PCO2 TEMP ADJ BLD: 30.8 MM HG (ref 35–45)
PCO2 TEMP ADJ BLD: 36 MM HG (ref 35–45)
PCO2 TEMP ADJ BLD: 46.5 MM HG (ref 35–45)
PEEP RESPIRATORY: 14 CM[H2O]
PH BLDA: 7.29 PH UNITS (ref 7.35–7.45)
PH BLDA: 7.41 PH UNITS (ref 7.35–7.45)
PH BLDA: 7.48 PH UNITS (ref 7.35–7.45)
PH UR STRIP.AUTO: 5.5 [PH] (ref 5–8)
PH, TEMP CORRECTED: 7.29 PH UNITS (ref 7.35–7.45)
PH, TEMP CORRECTED: 7.41 PH UNITS (ref 7.35–7.45)
PH, TEMP CORRECTED: 7.48 PH UNITS (ref 7.35–7.45)
PHOSPHATE SERPL-MCNC: 3 MG/DL (ref 2.5–4.5)
PLAT MORPH BLD: NORMAL
PLATELET # BLD AUTO: 370 10*3/MM3 (ref 140–450)
PMV BLD AUTO: 9.5 FL (ref 6–12)
PO2 BLDA: 105 MM HG (ref 83–108)
PO2 BLDA: 65.7 MM HG (ref 83–108)
PO2 BLDA: 81.7 MM HG (ref 83–108)
PO2 TEMP ADJ BLD: 105 MM HG (ref 83–108)
PO2 TEMP ADJ BLD: 65.7 MM HG (ref 83–108)
PO2 TEMP ADJ BLD: 81.7 MM HG (ref 83–108)
POLYCHROMASIA BLD QL SMEAR: ABNORMAL
POTASSIUM SERPL-SCNC: 3.2 MMOL/L (ref 3.5–5.2)
POTASSIUM SERPL-SCNC: 3.5 MMOL/L (ref 3.5–5.2)
PROCALCITONIN SERPL-MCNC: 0.26 NG/ML (ref 0–0.25)
PROT SERPL-MCNC: 5.6 G/DL (ref 6–8.5)
PROT SERPL-MCNC: 6.4 G/DL (ref 6–8.5)
PROT UR QL STRIP: ABNORMAL
RBC # BLD AUTO: 4.58 10*6/MM3 (ref 3.77–5.28)
RBC # UR: ABNORMAL /HPF
REF LAB TEST METHOD: ABNORMAL
SAO2 % BLDCOA: 90.4 % (ref 94–99)
SAO2 % BLDCOA: 95.6 % (ref 94–99)
SAO2 % BLDCOA: 96.4 % (ref 94–99)
SARS-COV-2 RNA PNL SPEC NAA+PROBE: NOT DETECTED
SARS-COV-2 RNA RESP QL NAA+PROBE: DETECTED
SET MECH RESP RATE: 16
SET MECH RESP RATE: 20
SET MECH RESP RATE: 30
SODIUM SERPL-SCNC: 138 MMOL/L (ref 136–145)
SODIUM SERPL-SCNC: 140 MMOL/L (ref 136–145)
SP GR UR STRIP: >1.03 (ref 1–1.03)
SQUAMOUS #/AREA URNS HPF: ABNORMAL /HPF
STRESS TARGET HR: 150 BPM
TROPONIN T SERPL-MCNC: 0.08 NG/ML (ref 0–0.03)
UROBILINOGEN UR QL STRIP: ABNORMAL
VENTILATOR MODE: ABNORMAL
VENTILATOR MODE: ABNORMAL
VENTILATOR MODE: AC
VT ON VENT VENT: 400 ML
WBC # BLD AUTO: 27.99 10*3/MM3 (ref 3.4–10.8)
WBC MORPH BLD: NORMAL
WBC UR QL AUTO: ABNORMAL /HPF
WHOLE BLOOD HOLD SPECIMEN: NORMAL
WHOLE BLOOD HOLD SPECIMEN: NORMAL

## 2021-08-27 PROCEDURE — 94799 UNLISTED PULMONARY SVC/PX: CPT

## 2021-08-27 PROCEDURE — 87040 BLOOD CULTURE FOR BACTERIA: CPT | Performed by: INTERNAL MEDICINE

## 2021-08-27 PROCEDURE — 25010000002 FENTANYL CITRATE (PF) 2500 MCG/50ML SOLUTION: Performed by: INTERNAL MEDICINE

## 2021-08-27 PROCEDURE — 25010000002 MEROPENEM PER 100 MG: Performed by: EMERGENCY MEDICINE

## 2021-08-27 PROCEDURE — 36600 WITHDRAWAL OF ARTERIAL BLOOD: CPT

## 2021-08-27 PROCEDURE — 71045 X-RAY EXAM CHEST 1 VIEW: CPT

## 2021-08-27 PROCEDURE — 83036 HEMOGLOBIN GLYCOSYLATED A1C: CPT | Performed by: INTERNAL MEDICINE

## 2021-08-27 PROCEDURE — 85007 BL SMEAR W/DIFF WBC COUNT: CPT | Performed by: EMERGENCY MEDICINE

## 2021-08-27 PROCEDURE — 5A1955Z RESPIRATORY VENTILATION, GREATER THAN 96 CONSECUTIVE HOURS: ICD-10-PCS | Performed by: INTERNAL MEDICINE

## 2021-08-27 PROCEDURE — 93005 ELECTROCARDIOGRAM TRACING: CPT

## 2021-08-27 PROCEDURE — 87070 CULTURE OTHR SPECIMN AEROBIC: CPT | Performed by: INTERNAL MEDICINE

## 2021-08-27 PROCEDURE — 94002 VENT MGMT INPAT INIT DAY: CPT

## 2021-08-27 PROCEDURE — 93005 ELECTROCARDIOGRAM TRACING: CPT | Performed by: EMERGENCY MEDICINE

## 2021-08-27 PROCEDURE — 94640 AIRWAY INHALATION TREATMENT: CPT

## 2021-08-27 PROCEDURE — 0BH17EZ INSERTION OF ENDOTRACHEAL AIRWAY INTO TRACHEA, VIA NATURAL OR ARTIFICIAL OPENING: ICD-10-PCS | Performed by: INTERNAL MEDICINE

## 2021-08-27 PROCEDURE — 83880 ASSAY OF NATRIURETIC PEPTIDE: CPT | Performed by: INTERNAL MEDICINE

## 2021-08-27 PROCEDURE — 25010000002 PROPOFOL 1000 MG/100ML EMULSION

## 2021-08-27 PROCEDURE — 87641 MR-STAPH DNA AMP PROBE: CPT | Performed by: INTERNAL MEDICINE

## 2021-08-27 PROCEDURE — 82803 BLOOD GASES ANY COMBINATION: CPT

## 2021-08-27 PROCEDURE — 25010000002 VANCOMYCIN 10 G RECONSTITUTED SOLUTION: Performed by: EMERGENCY MEDICINE

## 2021-08-27 PROCEDURE — 84702 CHORIONIC GONADOTROPIN TEST: CPT | Performed by: EMERGENCY MEDICINE

## 2021-08-27 PROCEDURE — 85025 COMPLETE CBC W/AUTO DIFF WBC: CPT | Performed by: EMERGENCY MEDICINE

## 2021-08-27 PROCEDURE — 93306 TTE W/DOPPLER COMPLETE: CPT

## 2021-08-27 PROCEDURE — 84100 ASSAY OF PHOSPHORUS: CPT | Performed by: INTERNAL MEDICINE

## 2021-08-27 PROCEDURE — 83605 ASSAY OF LACTIC ACID: CPT | Performed by: EMERGENCY MEDICINE

## 2021-08-27 PROCEDURE — 0 IOPAMIDOL PER 1 ML: Performed by: EMERGENCY MEDICINE

## 2021-08-27 PROCEDURE — 87635 SARS-COV-2 COVID-19 AMP PRB: CPT | Performed by: EMERGENCY MEDICINE

## 2021-08-27 PROCEDURE — 25010000002 ENOXAPARIN PER 10 MG: Performed by: INTERNAL MEDICINE

## 2021-08-27 PROCEDURE — 87205 SMEAR GRAM STAIN: CPT | Performed by: INTERNAL MEDICINE

## 2021-08-27 PROCEDURE — 31500 INSERT EMERGENCY AIRWAY: CPT | Performed by: INTERNAL MEDICINE

## 2021-08-27 PROCEDURE — 87040 BLOOD CULTURE FOR BACTERIA: CPT | Performed by: EMERGENCY MEDICINE

## 2021-08-27 PROCEDURE — B543ZZA ULTRASONOGRAPHY OF RIGHT JUGULAR VEINS, GUIDANCE: ICD-10-PCS | Performed by: INTERNAL MEDICINE

## 2021-08-27 PROCEDURE — 83735 ASSAY OF MAGNESIUM: CPT | Performed by: INTERNAL MEDICINE

## 2021-08-27 PROCEDURE — 25010000002 MIDAZOLAM PER 1 MG

## 2021-08-27 PROCEDURE — 83615 LACTATE (LD) (LDH) ENZYME: CPT | Performed by: EMERGENCY MEDICINE

## 2021-08-27 PROCEDURE — 82728 ASSAY OF FERRITIN: CPT | Performed by: EMERGENCY MEDICINE

## 2021-08-27 PROCEDURE — 80053 COMPREHEN METABOLIC PANEL: CPT | Performed by: INTERNAL MEDICINE

## 2021-08-27 PROCEDURE — 85379 FIBRIN DEGRADATION QUANT: CPT | Performed by: EMERGENCY MEDICINE

## 2021-08-27 PROCEDURE — 25010000002 AZITHROMYCIN PER 500 MG: Performed by: INTERNAL MEDICINE

## 2021-08-27 PROCEDURE — 82962 GLUCOSE BLOOD TEST: CPT

## 2021-08-27 PROCEDURE — 25010000002 PROPOFOL 10 MG/ML EMULSION: Performed by: INTERNAL MEDICINE

## 2021-08-27 PROCEDURE — 86140 C-REACTIVE PROTEIN: CPT | Performed by: EMERGENCY MEDICINE

## 2021-08-27 PROCEDURE — 25010000002 LORAZEPAM PER 2 MG: Performed by: INTERNAL MEDICINE

## 2021-08-27 PROCEDURE — 4A133J1 MONITORING OF ARTERIAL PULSE, PERIPHERAL, PERCUTANEOUS APPROACH: ICD-10-PCS | Performed by: INTERNAL MEDICINE

## 2021-08-27 PROCEDURE — 99223 1ST HOSP IP/OBS HIGH 75: CPT | Performed by: INTERNAL MEDICINE

## 2021-08-27 PROCEDURE — 63710000001 INSULIN REGULAR HUMAN PER 5 UNITS: Performed by: INTERNAL MEDICINE

## 2021-08-27 PROCEDURE — 93306 TTE W/DOPPLER COMPLETE: CPT | Performed by: INTERNAL MEDICINE

## 2021-08-27 PROCEDURE — 71275 CT ANGIOGRAPHY CHEST: CPT

## 2021-08-27 PROCEDURE — 25010000002 MEROPENEM PER 100 MG: Performed by: INTERNAL MEDICINE

## 2021-08-27 PROCEDURE — 94660 CPAP INITIATION&MGMT: CPT

## 2021-08-27 PROCEDURE — 99284 EMERGENCY DEPT VISIT MOD MDM: CPT

## 2021-08-27 PROCEDURE — 25010000002 FENTANYL CITRATE (PF) 50 MCG/ML SOLUTION

## 2021-08-27 PROCEDURE — 81001 URINALYSIS AUTO W/SCOPE: CPT | Performed by: INTERNAL MEDICINE

## 2021-08-27 PROCEDURE — 80053 COMPREHEN METABOLIC PANEL: CPT | Performed by: EMERGENCY MEDICINE

## 2021-08-27 PROCEDURE — 4A133B1 MONITORING OF ARTERIAL PRESSURE, PERIPHERAL, PERCUTANEOUS APPROACH: ICD-10-PCS | Performed by: INTERNAL MEDICINE

## 2021-08-27 PROCEDURE — 84145 PROCALCITONIN (PCT): CPT | Performed by: EMERGENCY MEDICINE

## 2021-08-27 PROCEDURE — 84484 ASSAY OF TROPONIN QUANT: CPT | Performed by: INTERNAL MEDICINE

## 2021-08-27 PROCEDURE — 25010000002 DEXAMETHASONE PER 1 MG: Performed by: INTERNAL MEDICINE

## 2021-08-27 PROCEDURE — 93010 ELECTROCARDIOGRAM REPORT: CPT | Performed by: INTERNAL MEDICINE

## 2021-08-27 PROCEDURE — 25010000002 SODIUM CHLORIDE 0.9 % WITH KCL 20 MEQ 20-0.9 MEQ/L-% SOLUTION: Performed by: INTERNAL MEDICINE

## 2021-08-27 PROCEDURE — 74018 RADEX ABDOMEN 1 VIEW: CPT

## 2021-08-27 PROCEDURE — C1751 CATH, INF, PER/CENT/MIDLINE: HCPCS

## 2021-08-27 RX ORDER — SODIUM CHLORIDE 0.9 % (FLUSH) 0.9 %
10 SYRINGE (ML) INJECTION EVERY 12 HOURS SCHEDULED
Status: DISCONTINUED | OUTPATIENT
Start: 2021-08-27 | End: 2021-09-24 | Stop reason: HOSPADM

## 2021-08-27 RX ORDER — SODIUM CHLORIDE AND POTASSIUM CHLORIDE 150; 900 MG/100ML; MG/100ML
75 INJECTION, SOLUTION INTRAVENOUS CONTINUOUS
Status: DISCONTINUED | OUTPATIENT
Start: 2021-08-27 | End: 2021-08-29

## 2021-08-27 RX ORDER — LORAZEPAM 2 MG/ML
0.25 INJECTION INTRAMUSCULAR EVERY 12 HOURS PRN
Status: DISPENSED | OUTPATIENT
Start: 2021-08-27 | End: 2021-09-16

## 2021-08-27 RX ORDER — ACETAMINOPHEN 650 MG/1
650 SUPPOSITORY RECTAL EVERY 6 HOURS PRN
Status: DISCONTINUED | OUTPATIENT
Start: 2021-08-27 | End: 2021-09-24 | Stop reason: HOSPADM

## 2021-08-27 RX ORDER — ONDANSETRON 2 MG/ML
4 INJECTION INTRAMUSCULAR; INTRAVENOUS EVERY 6 HOURS PRN
Status: DISCONTINUED | OUTPATIENT
Start: 2021-08-27 | End: 2021-09-24 | Stop reason: HOSPADM

## 2021-08-27 RX ORDER — OXYMETAZOLINE HYDROCHLORIDE 0.05 G/100ML
2 SPRAY NASAL 2 TIMES DAILY
Status: ON HOLD | COMMUNITY
End: 2021-09-10

## 2021-08-27 RX ORDER — FENTANYL CITRATE 50 UG/ML
INJECTION, SOLUTION INTRAMUSCULAR; INTRAVENOUS
Status: COMPLETED
Start: 2021-08-27 | End: 2021-08-27

## 2021-08-27 RX ORDER — SODIUM CHLORIDE 0.9 % (FLUSH) 0.9 %
10 SYRINGE (ML) INJECTION AS NEEDED
Status: DISCONTINUED | OUTPATIENT
Start: 2021-08-27 | End: 2021-09-24 | Stop reason: HOSPADM

## 2021-08-27 RX ORDER — PROPOFOL 10 MG/ML
INJECTION, EMULSION INTRAVENOUS
Status: COMPLETED
Start: 2021-08-27 | End: 2021-08-27

## 2021-08-27 RX ORDER — NICOTINE POLACRILEX 4 MG
15 LOZENGE BUCCAL
Status: DISCONTINUED | OUTPATIENT
Start: 2021-08-27 | End: 2021-09-24 | Stop reason: HOSPADM

## 2021-08-27 RX ORDER — MIDAZOLAM HYDROCHLORIDE 1 MG/ML
INJECTION INTRAMUSCULAR; INTRAVENOUS
Status: COMPLETED
Start: 2021-08-27 | End: 2021-08-27

## 2021-08-27 RX ORDER — ACETAMINOPHEN 325 MG/1
650 TABLET ORAL EVERY 6 HOURS PRN
Status: DISCONTINUED | OUTPATIENT
Start: 2021-08-27 | End: 2021-09-24 | Stop reason: HOSPADM

## 2021-08-27 RX ORDER — DEXTROSE MONOHYDRATE 25 G/50ML
25 INJECTION, SOLUTION INTRAVENOUS
Status: DISCONTINUED | OUTPATIENT
Start: 2021-08-27 | End: 2021-09-24 | Stop reason: HOSPADM

## 2021-08-27 RX ORDER — ALBUTEROL SULFATE 90 UG/1
6 AEROSOL, METERED RESPIRATORY (INHALATION)
Status: DISCONTINUED | OUTPATIENT
Start: 2021-08-27 | End: 2021-09-14

## 2021-08-27 RX ORDER — DEXAMETHASONE SODIUM PHOSPHATE 10 MG/ML
6 INJECTION INTRAMUSCULAR; INTRAVENOUS DAILY
Status: DISCONTINUED | OUTPATIENT
Start: 2021-08-27 | End: 2021-09-05

## 2021-08-27 RX ORDER — ALBUTEROL SULFATE 90 UG/1
2 AEROSOL, METERED RESPIRATORY (INHALATION)
Status: DISCONTINUED | OUTPATIENT
Start: 2021-08-27 | End: 2021-08-27 | Stop reason: SDUPTHER

## 2021-08-27 RX ORDER — ACETAMINOPHEN 325 MG/1
650 TABLET ORAL EVERY 6 HOURS PRN
Status: DISCONTINUED | OUTPATIENT
Start: 2021-08-27 | End: 2021-08-27 | Stop reason: SDUPTHER

## 2021-08-27 RX ORDER — CHLORHEXIDINE GLUCONATE 0.12 MG/ML
15 RINSE ORAL EVERY 12 HOURS SCHEDULED
Status: DISCONTINUED | OUTPATIENT
Start: 2021-08-27 | End: 2021-09-24 | Stop reason: HOSPADM

## 2021-08-27 RX ORDER — MIDAZOLAM HYDROCHLORIDE 1 MG/ML
2 INJECTION INTRAMUSCULAR; INTRAVENOUS ONCE
Status: COMPLETED | OUTPATIENT
Start: 2021-08-27 | End: 2021-08-27

## 2021-08-27 RX ORDER — FENTANYL CITRATE 50 UG/ML
25 INJECTION, SOLUTION INTRAMUSCULAR; INTRAVENOUS
Status: ACTIVE | OUTPATIENT
Start: 2021-08-27 | End: 2021-09-09

## 2021-08-27 RX ORDER — HYDROXYZINE HYDROCHLORIDE 25 MG/1
25 TABLET, FILM COATED ORAL 3 TIMES DAILY PRN
Status: ON HOLD | COMMUNITY
End: 2021-09-10

## 2021-08-27 RX ORDER — FAMOTIDINE 10 MG/ML
20 INJECTION, SOLUTION INTRAVENOUS 2 TIMES DAILY
Status: DISCONTINUED | OUTPATIENT
Start: 2021-08-27 | End: 2021-09-05

## 2021-08-27 RX ORDER — FAMOTIDINE 10 MG/ML
20 INJECTION, SOLUTION INTRAVENOUS EVERY 12 HOURS SCHEDULED
Status: DISCONTINUED | OUTPATIENT
Start: 2021-08-27 | End: 2021-08-27

## 2021-08-27 RX ADMIN — LORAZEPAM 0.25 MG: 2 INJECTION INTRAMUSCULAR; INTRAVENOUS at 16:57

## 2021-08-27 RX ADMIN — FENTANYL CITRATE 25 MCG: 50 INJECTION, SOLUTION INTRAMUSCULAR; INTRAVENOUS at 17:05

## 2021-08-27 RX ADMIN — GUAIFENESIN 400 MG: 100 SOLUTION ORAL at 23:30

## 2021-08-27 RX ADMIN — MEROPENEM 1 G: 1 INJECTION, POWDER, FOR SOLUTION INTRAVENOUS at 20:41

## 2021-08-27 RX ADMIN — MEROPENEM 1 G: 1 INJECTION, POWDER, FOR SOLUTION INTRAVENOUS at 12:51

## 2021-08-27 RX ADMIN — PROPOFOL 75 MCG/KG/MIN: 10 INJECTION, EMULSION INTRAVENOUS at 16:58

## 2021-08-27 RX ADMIN — DEXAMETHASONE SODIUM PHOSPHATE 6 MG: 10 INJECTION INTRAMUSCULAR; INTRAVENOUS at 16:21

## 2021-08-27 RX ADMIN — POTASSIUM CHLORIDE AND SODIUM CHLORIDE 75 ML/HR: 900; 150 INJECTION, SOLUTION INTRAVENOUS at 16:22

## 2021-08-27 RX ADMIN — ALBUTEROL SULFATE 6 PUFF: 108 INHALANT RESPIRATORY (INHALATION) at 23:54

## 2021-08-27 RX ADMIN — ALBUTEROL SULFATE 6 PUFF: 108 INHALANT RESPIRATORY (INHALATION) at 18:26

## 2021-08-27 RX ADMIN — SODIUM CHLORIDE, PRESERVATIVE FREE 10 ML: 5 INJECTION INTRAVENOUS at 16:21

## 2021-08-27 RX ADMIN — CHLORHEXIDINE GLUCONATE 0.12% ORAL RINSE 15 ML: 1.2 LIQUID ORAL at 20:41

## 2021-08-27 RX ADMIN — VANCOMYCIN HYDROCHLORIDE 1750 MG: 10 INJECTION, POWDER, LYOPHILIZED, FOR SOLUTION INTRAVENOUS at 13:23

## 2021-08-27 RX ADMIN — MIDAZOLAM HYDROCHLORIDE 2 MG: 1 INJECTION INTRAMUSCULAR; INTRAVENOUS at 16:58

## 2021-08-27 RX ADMIN — VECURONIUM BROMIDE 0.6 MCG/KG/MIN: 1 INJECTION, POWDER, LYOPHILIZED, FOR SOLUTION INTRAVENOUS at 17:43

## 2021-08-27 RX ADMIN — MINERAL OIL, PETROLATUM 1 EACH: 425; 568 OINTMENT OPHTHALMIC at 20:42

## 2021-08-27 RX ADMIN — SODIUM CHLORIDE, PRESERVATIVE FREE 10 ML: 5 INJECTION INTRAVENOUS at 20:42

## 2021-08-27 RX ADMIN — MIDAZOLAM HYDROCHLORIDE 2 MG: 1 INJECTION, SOLUTION INTRAMUSCULAR; INTRAVENOUS at 16:58

## 2021-08-27 RX ADMIN — FAMOTIDINE 20 MG: 10 INJECTION INTRAVENOUS at 16:26

## 2021-08-27 RX ADMIN — PROPOFOL 75 MCG/KG/MIN: 10 INJECTION, EMULSION INTRAVENOUS at 20:40

## 2021-08-27 RX ADMIN — AZITHROMYCIN MONOHYDRATE 500 MG: 500 INJECTION, POWDER, LYOPHILIZED, FOR SOLUTION INTRAVENOUS at 17:39

## 2021-08-27 RX ADMIN — FENTANYL CITRATE 50 MCG/HR: 50 INJECTION INTRAVENOUS at 17:57

## 2021-08-27 RX ADMIN — SODIUM CHLORIDE 2448 ML: 9 INJECTION, SOLUTION INTRAVENOUS at 12:51

## 2021-08-27 RX ADMIN — ACETAMINOPHEN 650 MG: 325 TABLET, FILM COATED ORAL at 11:04

## 2021-08-27 RX ADMIN — FAMOTIDINE 20 MG: 10 INJECTION INTRAVENOUS at 20:41

## 2021-08-27 RX ADMIN — GUAIFENESIN 400 MG: 100 SOLUTION ORAL at 18:07

## 2021-08-27 RX ADMIN — PROPOFOL 55 MCG/KG/MIN: 10 INJECTION, EMULSION INTRAVENOUS at 23:30

## 2021-08-27 RX ADMIN — ENOXAPARIN SODIUM 80 MG: 80 INJECTION, SOLUTION INTRAVENOUS; SUBCUTANEOUS at 14:03

## 2021-08-27 RX ADMIN — INSULIN HUMAN 2 UNITS: 100 INJECTION, SOLUTION PARENTERAL at 17:59

## 2021-08-27 RX ADMIN — MINERAL OIL, PETROLATUM 1 EACH: 425; 568 OINTMENT OPHTHALMIC at 23:30

## 2021-08-27 RX ADMIN — IOPAMIDOL 100 ML: 755 INJECTION, SOLUTION INTRAVENOUS at 13:16

## 2021-08-27 RX ADMIN — PROPOFOL 75 MCG/KG/MIN: 10 INJECTION, EMULSION INTRAVENOUS at 18:08

## 2021-08-28 ENCOUNTER — APPOINTMENT (OUTPATIENT)
Dept: GENERAL RADIOLOGY | Facility: HOSPITAL | Age: 46
End: 2021-08-28

## 2021-08-28 PROBLEM — K56.7 ILEUS: Status: ACTIVE | Noted: 2021-08-28

## 2021-08-28 LAB
ALBUMIN SERPL-MCNC: 2.3 G/DL (ref 3.5–5.2)
ALBUMIN/GLOB SERPL: 0.9 G/DL
ALP SERPL-CCNC: 49 U/L (ref 39–117)
ALT SERPL W P-5'-P-CCNC: 262 U/L (ref 1–33)
ANION GAP SERPL CALCULATED.3IONS-SCNC: 9 MMOL/L (ref 5–15)
ARTERIAL PATENCY WRIST A: ABNORMAL
ARTERIAL PATENCY WRIST A: ABNORMAL
AST SERPL-CCNC: 117 U/L (ref 1–32)
ATMOSPHERIC PRESS: 752 MMHG
ATMOSPHERIC PRESS: 754 MMHG
BASE EXCESS BLDA CALC-SCNC: -0.1 MMOL/L (ref 0–2)
BASE EXCESS BLDA CALC-SCNC: -0.6 MMOL/L (ref 0–2)
BDY SITE: ABNORMAL
BDY SITE: ABNORMAL
BILIRUB SERPL-MCNC: 0.7 MG/DL (ref 0–1.2)
BODY TEMPERATURE: 37 C
BODY TEMPERATURE: 37 C
BUN SERPL-MCNC: 17 MG/DL (ref 6–20)
BUN/CREAT SERPL: 19.5 (ref 7–25)
CALCIUM SPEC-SCNC: 7.3 MG/DL (ref 8.6–10.5)
CHLORIDE SERPL-SCNC: 110 MMOL/L (ref 98–107)
CO2 SERPL-SCNC: 23 MMOL/L (ref 22–29)
CREAT SERPL-MCNC: 0.87 MG/DL (ref 0.57–1)
CRP SERPL-MCNC: 11.01 MG/DL (ref 0–0.5)
DEPRECATED RDW RBC AUTO: 42.8 FL (ref 37–54)
ERYTHROCYTE [DISTWIDTH] IN BLOOD BY AUTOMATED COUNT: 13.2 % (ref 12.3–15.4)
GFR SERPL CREATININE-BSD FRML MDRD: 71 ML/MIN/1.73
GLOBULIN UR ELPH-MCNC: 2.6 GM/DL
GLUCOSE BLDC GLUCOMTR-MCNC: 110 MG/DL (ref 70–130)
GLUCOSE BLDC GLUCOMTR-MCNC: 120 MG/DL (ref 70–130)
GLUCOSE BLDC GLUCOMTR-MCNC: 99 MG/DL (ref 70–130)
GLUCOSE SERPL-MCNC: 127 MG/DL (ref 65–99)
HCO3 BLDA-SCNC: 24.7 MMOL/L (ref 20–26)
HCO3 BLDA-SCNC: 26 MMOL/L (ref 20–26)
HCT VFR BLD AUTO: 31.2 % (ref 34–46.6)
HGB BLD-MCNC: 10.7 G/DL (ref 12–15.9)
INHALED O2 CONCENTRATION: 50 %
INHALED O2 CONCENTRATION: 55 %
LYMPHOCYTES # BLD MANUAL: 1.59 10*3/MM3 (ref 0.7–3.1)
LYMPHOCYTES NFR BLD MANUAL: 2 % (ref 5–12)
LYMPHOCYTES NFR BLD MANUAL: 9 % (ref 19.6–45.3)
Lab: ABNORMAL
MAGNESIUM SERPL-MCNC: 2.1 MG/DL (ref 1.6–2.6)
MCH RBC QN AUTO: 30.7 PG (ref 26.6–33)
MCHC RBC AUTO-ENTMCNC: 34.3 G/DL (ref 31.5–35.7)
MCV RBC AUTO: 89.7 FL (ref 79–97)
METAMYELOCYTES NFR BLD MANUAL: 1 % (ref 0–0)
MODALITY: ABNORMAL
MODALITY: ABNORMAL
MONOCYTES # BLD AUTO: 0.35 10*3/MM3 (ref 0.1–0.9)
MYELOCYTES NFR BLD MANUAL: 1 % (ref 0–0)
NEUTROPHILS # BLD AUTO: 15.34 10*3/MM3 (ref 1.7–7)
NEUTROPHILS NFR BLD MANUAL: 86 % (ref 42.7–76)
NEUTS BAND NFR BLD MANUAL: 1 % (ref 0–5)
NOTIFIED BY: ABNORMAL
NOTIFIED WHO: ABNORMAL
NRBC SPEC MANUAL: 2 /100 WBC (ref 0–0.2)
PCO2 BLDA: 42.2 MM HG (ref 35–45)
PCO2 BLDA: 47.5 MM HG (ref 35–45)
PCO2 TEMP ADJ BLD: 42.2 MM HG (ref 35–45)
PCO2 TEMP ADJ BLD: 47.5 MM HG (ref 35–45)
PEEP RESPIRATORY: 12 CM[H2O]
PEEP RESPIRATORY: 12 CM[H2O]
PH BLDA: 7.35 PH UNITS (ref 7.35–7.45)
PH BLDA: 7.38 PH UNITS (ref 7.35–7.45)
PH, TEMP CORRECTED: 7.35 PH UNITS (ref 7.35–7.45)
PH, TEMP CORRECTED: 7.38 PH UNITS (ref 7.35–7.45)
PHOSPHATE SERPL-MCNC: 2.4 MG/DL (ref 2.5–4.5)
PLAT MORPH BLD: NORMAL
PLATELET # BLD AUTO: 197 10*3/MM3 (ref 140–450)
PMV BLD AUTO: 10.1 FL (ref 6–12)
PO2 BLDA: 37.6 MM HG (ref 83–108)
PO2 BLDA: 73.4 MM HG (ref 83–108)
PO2 TEMP ADJ BLD: 37.6 MM HG (ref 83–108)
PO2 TEMP ADJ BLD: 73.4 MM HG (ref 83–108)
POIKILOCYTOSIS BLD QL SMEAR: ABNORMAL
POLYCHROMASIA BLD QL SMEAR: ABNORMAL
POTASSIUM SERPL-SCNC: 3.1 MMOL/L (ref 3.5–5.2)
POTASSIUM SERPL-SCNC: 4.1 MMOL/L (ref 3.5–5.2)
PROCALCITONIN SERPL-MCNC: 1.48 NG/ML (ref 0–0.25)
PROT SERPL-MCNC: 4.9 G/DL (ref 6–8.5)
RBC # BLD AUTO: 3.48 10*6/MM3 (ref 3.77–5.28)
SAO2 % BLDCOA: 64.3 % (ref 94–99)
SAO2 % BLDCOA: 93.3 % (ref 94–99)
SET MECH RESP RATE: 30
SET MECH RESP RATE: 30
SODIUM SERPL-SCNC: 142 MMOL/L (ref 136–145)
TROPONIN T SERPL-MCNC: 0.02 NG/ML (ref 0–0.03)
VENTILATOR MODE: AC
VENTILATOR MODE: AC
VT ON VENT VENT: 400 ML
VT ON VENT VENT: 400 ML
WBC # BLD AUTO: 17.63 10*3/MM3 (ref 3.4–10.8)
WBC MORPH BLD: NORMAL

## 2021-08-28 PROCEDURE — 80053 COMPREHEN METABOLIC PANEL: CPT | Performed by: INTERNAL MEDICINE

## 2021-08-28 PROCEDURE — 82962 GLUCOSE BLOOD TEST: CPT

## 2021-08-28 PROCEDURE — 84132 ASSAY OF SERUM POTASSIUM: CPT | Performed by: INTERNAL MEDICINE

## 2021-08-28 PROCEDURE — 86140 C-REACTIVE PROTEIN: CPT | Performed by: INTERNAL MEDICINE

## 2021-08-28 PROCEDURE — 94003 VENT MGMT INPAT SUBQ DAY: CPT

## 2021-08-28 PROCEDURE — 25010000002 MEROPENEM PER 100 MG: Performed by: INTERNAL MEDICINE

## 2021-08-28 PROCEDURE — 25010000002 PROPOFOL 10 MG/ML EMULSION: Performed by: INTERNAL MEDICINE

## 2021-08-28 PROCEDURE — 25010000002 DEXAMETHASONE PER 1 MG: Performed by: INTERNAL MEDICINE

## 2021-08-28 PROCEDURE — 25010000002 AZITHROMYCIN PER 500 MG: Performed by: INTERNAL MEDICINE

## 2021-08-28 PROCEDURE — 85025 COMPLETE CBC W/AUTO DIFF WBC: CPT | Performed by: INTERNAL MEDICINE

## 2021-08-28 PROCEDURE — 25010000002 VANCOMYCIN 10 G RECONSTITUTED SOLUTION: Performed by: INTERNAL MEDICINE

## 2021-08-28 PROCEDURE — 25010000003 POTASSIUM CHLORIDE PER 2 MEQ: Performed by: INTERNAL MEDICINE

## 2021-08-28 PROCEDURE — 25010000002 ENOXAPARIN PER 10 MG: Performed by: INTERNAL MEDICINE

## 2021-08-28 PROCEDURE — 94799 UNLISTED PULMONARY SVC/PX: CPT

## 2021-08-28 PROCEDURE — 74018 RADEX ABDOMEN 1 VIEW: CPT

## 2021-08-28 PROCEDURE — 84145 PROCALCITONIN (PCT): CPT | Performed by: INTERNAL MEDICINE

## 2021-08-28 PROCEDURE — 36415 COLL VENOUS BLD VENIPUNCTURE: CPT | Performed by: INTERNAL MEDICINE

## 2021-08-28 PROCEDURE — 25010000002 FENTANYL CITRATE (PF) 2500 MCG/50ML SOLUTION: Performed by: INTERNAL MEDICINE

## 2021-08-28 PROCEDURE — 84100 ASSAY OF PHOSPHORUS: CPT | Performed by: INTERNAL MEDICINE

## 2021-08-28 PROCEDURE — 99233 SBSQ HOSP IP/OBS HIGH 50: CPT | Performed by: INTERNAL MEDICINE

## 2021-08-28 PROCEDURE — 25010000002 FENTANYL 10 MCG/1 ML NS: Performed by: INTERNAL MEDICINE

## 2021-08-28 PROCEDURE — 85007 BL SMEAR W/DIFF WBC COUNT: CPT | Performed by: INTERNAL MEDICINE

## 2021-08-28 PROCEDURE — 82803 BLOOD GASES ANY COMBINATION: CPT

## 2021-08-28 PROCEDURE — 83735 ASSAY OF MAGNESIUM: CPT | Performed by: INTERNAL MEDICINE

## 2021-08-28 PROCEDURE — 84484 ASSAY OF TROPONIN QUANT: CPT | Performed by: INTERNAL MEDICINE

## 2021-08-28 PROCEDURE — 25010000002 SODIUM CHLORIDE 0.9 % WITH KCL 20 MEQ 20-0.9 MEQ/L-% SOLUTION: Performed by: INTERNAL MEDICINE

## 2021-08-28 RX ORDER — NOREPINEPHRINE BIT/0.9 % NACL 8 MG/250ML
.02-.3 INFUSION BOTTLE (ML) INTRAVENOUS
Status: DISCONTINUED | OUTPATIENT
Start: 2021-08-28 | End: 2021-09-05

## 2021-08-28 RX ORDER — MELATONIN
1000 DAILY
Status: DISCONTINUED | OUTPATIENT
Start: 2021-08-28 | End: 2021-09-24 | Stop reason: HOSPADM

## 2021-08-28 RX ORDER — POTASSIUM CHLORIDE 29.8 MG/ML
20 INJECTION INTRAVENOUS
Status: COMPLETED | OUTPATIENT
Start: 2021-08-28 | End: 2021-08-28

## 2021-08-28 RX ORDER — WHEY PROTEIN ISOLATE 6 G-25/7 G
2 POWDER (GRAM) ORAL 2 TIMES DAILY
Status: DISCONTINUED | OUTPATIENT
Start: 2021-08-28 | End: 2021-08-31

## 2021-08-28 RX ORDER — ZINC SULFATE 50(220)MG
220 CAPSULE ORAL DAILY
Status: DISCONTINUED | OUTPATIENT
Start: 2021-08-28 | End: 2021-09-24 | Stop reason: HOSPADM

## 2021-08-28 RX ORDER — ASCORBIC ACID 500 MG
500 TABLET ORAL DAILY
Status: DISCONTINUED | OUTPATIENT
Start: 2021-08-28 | End: 2021-09-24 | Stop reason: HOSPADM

## 2021-08-28 RX ADMIN — FENTANYL CITRATE 300 MCG/HR: 50 INJECTION INTRAVENOUS at 11:30

## 2021-08-28 RX ADMIN — PROPOFOL 40 MCG/KG/MIN: 10 INJECTION, EMULSION INTRAVENOUS at 06:10

## 2021-08-28 RX ADMIN — VANCOMYCIN HYDROCHLORIDE 750 MG: 10 INJECTION, POWDER, LYOPHILIZED, FOR SOLUTION INTRAVENOUS at 14:38

## 2021-08-28 RX ADMIN — ALBUTEROL SULFATE 6 PUFF: 108 INHALANT RESPIRATORY (INHALATION) at 06:48

## 2021-08-28 RX ADMIN — GUAIFENESIN 400 MG: 100 SOLUTION ORAL at 06:10

## 2021-08-28 RX ADMIN — FAMOTIDINE 20 MG: 10 INJECTION INTRAVENOUS at 10:29

## 2021-08-28 RX ADMIN — SODIUM CHLORIDE, PRESERVATIVE FREE 10 ML: 5 INJECTION INTRAVENOUS at 20:02

## 2021-08-28 RX ADMIN — MINERAL OIL, PETROLATUM: 425; 568 OINTMENT OPHTHALMIC at 08:07

## 2021-08-28 RX ADMIN — OXYCODONE HYDROCHLORIDE AND ACETAMINOPHEN 500 MG: 500 TABLET ORAL at 14:38

## 2021-08-28 RX ADMIN — GUAIFENESIN 400 MG: 100 SOLUTION ORAL at 18:04

## 2021-08-28 RX ADMIN — PROPOFOL 40 MCG/KG/MIN: 10 INJECTION, EMULSION INTRAVENOUS at 02:32

## 2021-08-28 RX ADMIN — MEROPENEM 1 G: 1 INJECTION, POWDER, FOR SOLUTION INTRAVENOUS at 12:10

## 2021-08-28 RX ADMIN — MEROPENEM 1 G: 1 INJECTION, POWDER, FOR SOLUTION INTRAVENOUS at 04:05

## 2021-08-28 RX ADMIN — PROPOFOL 75 MCG/KG/MIN: 10 INJECTION, EMULSION INTRAVENOUS at 20:00

## 2021-08-28 RX ADMIN — PROPOFOL 75 MCG/KG/MIN: 10 INJECTION, EMULSION INTRAVENOUS at 22:20

## 2021-08-28 RX ADMIN — VANCOMYCIN HYDROCHLORIDE 750 MG: 10 INJECTION, POWDER, LYOPHILIZED, FOR SOLUTION INTRAVENOUS at 02:32

## 2021-08-28 RX ADMIN — Medication 1000 UNITS: at 14:38

## 2021-08-28 RX ADMIN — MINERAL OIL, PETROLATUM: 425; 568 OINTMENT OPHTHALMIC at 18:00

## 2021-08-28 RX ADMIN — POTASSIUM CHLORIDE 20 MEQ: 29.8 INJECTION, SOLUTION INTRAVENOUS at 12:09

## 2021-08-28 RX ADMIN — FAMOTIDINE 20 MG: 10 INJECTION INTRAVENOUS at 21:21

## 2021-08-28 RX ADMIN — ALBUTEROL SULFATE 6 PUFF: 108 INHALANT RESPIRATORY (INHALATION) at 13:20

## 2021-08-28 RX ADMIN — AZITHROMYCIN MONOHYDRATE 500 MG: 500 INJECTION, POWDER, LYOPHILIZED, FOR SOLUTION INTRAVENOUS at 18:09

## 2021-08-28 RX ADMIN — MINERAL OIL, PETROLATUM: 425; 568 OINTMENT OPHTHALMIC at 20:01

## 2021-08-28 RX ADMIN — FENTANYL CITRATE 300 MCG/HR: 50 INJECTION INTRAVENOUS at 21:21

## 2021-08-28 RX ADMIN — POTASSIUM CHLORIDE 20 MEQ: 29.8 INJECTION, SOLUTION INTRAVENOUS at 10:00

## 2021-08-28 RX ADMIN — CHLORHEXIDINE GLUCONATE 0.12% ORAL RINSE 15 ML: 1.2 LIQUID ORAL at 08:07

## 2021-08-28 RX ADMIN — ALBUTEROL SULFATE 6 PUFF: 108 INHALANT RESPIRATORY (INHALATION) at 23:47

## 2021-08-28 RX ADMIN — SODIUM CHLORIDE, PRESERVATIVE FREE 10 ML: 5 INJECTION INTRAVENOUS at 08:07

## 2021-08-28 RX ADMIN — PROPOFOL 75 MCG/KG/MIN: 10 INJECTION, EMULSION INTRAVENOUS at 14:08

## 2021-08-28 RX ADMIN — MINERAL OIL, PETROLATUM: 425; 568 OINTMENT OPHTHALMIC at 12:11

## 2021-08-28 RX ADMIN — Medication 2 PACKET: at 11:37

## 2021-08-28 RX ADMIN — ENOXAPARIN SODIUM 90 MG: 100 INJECTION SUBCUTANEOUS at 06:10

## 2021-08-28 RX ADMIN — MINERAL OIL, PETROLATUM 1 EACH: 425; 568 OINTMENT OPHTHALMIC at 04:05

## 2021-08-28 RX ADMIN — POTASSIUM CHLORIDE AND SODIUM CHLORIDE 75 ML/HR: 900; 150 INJECTION, SOLUTION INTRAVENOUS at 14:05

## 2021-08-28 RX ADMIN — PROPOFOL 75 MCG/KG/MIN: 10 INJECTION, EMULSION INTRAVENOUS at 11:28

## 2021-08-28 RX ADMIN — ALBUTEROL SULFATE 6 PUFF: 108 INHALANT RESPIRATORY (INHALATION) at 20:28

## 2021-08-28 RX ADMIN — CHLORHEXIDINE GLUCONATE 0.12% ORAL RINSE 15 ML: 1.2 LIQUID ORAL at 20:01

## 2021-08-28 RX ADMIN — DEXAMETHASONE SODIUM PHOSPHATE 6 MG: 10 INJECTION INTRAMUSCULAR; INTRAVENOUS at 08:07

## 2021-08-28 RX ADMIN — ENOXAPARIN SODIUM 90 MG: 100 INJECTION SUBCUTANEOUS at 18:05

## 2021-08-28 RX ADMIN — MEROPENEM 1 G: 1 INJECTION, POWDER, FOR SOLUTION INTRAVENOUS at 20:01

## 2021-08-28 RX ADMIN — GUAIFENESIN 400 MG: 100 SOLUTION ORAL at 12:10

## 2021-08-28 RX ADMIN — NOREPINEPHRINE BITARTRATE 0.02 MCG/KG/MIN: 1 INJECTION, SOLUTION, CONCENTRATE INTRAVENOUS at 14:37

## 2021-08-28 RX ADMIN — PROPOFOL 75 MCG/KG/MIN: 10 INJECTION, EMULSION INTRAVENOUS at 18:00

## 2021-08-29 ENCOUNTER — APPOINTMENT (OUTPATIENT)
Dept: ULTRASOUND IMAGING | Facility: HOSPITAL | Age: 46
End: 2021-08-29

## 2021-08-29 LAB
ALBUMIN SERPL-MCNC: 2.3 G/DL (ref 3.5–5.2)
ALBUMIN/GLOB SERPL: 0.8 G/DL
ALP SERPL-CCNC: 48 U/L (ref 39–117)
ALT SERPL W P-5'-P-CCNC: 166 U/L (ref 1–33)
ANION GAP SERPL CALCULATED.3IONS-SCNC: 11 MMOL/L (ref 5–15)
ARTERIAL PATENCY WRIST A: ABNORMAL
AST SERPL-CCNC: 37 U/L (ref 1–32)
ATMOSPHERIC PRESS: 752 MMHG
BASE EXCESS BLDA CALC-SCNC: -6.1 MMOL/L (ref 0–2)
BDY SITE: ABNORMAL
BILIRUB SERPL-MCNC: 0.6 MG/DL (ref 0–1.2)
BODY TEMPERATURE: 37 C
BUN SERPL-MCNC: 10 MG/DL (ref 6–20)
BUN/CREAT SERPL: 12.8 (ref 7–25)
BURR CELLS BLD QL SMEAR: ABNORMAL
CALCIUM SPEC-SCNC: 7.8 MG/DL (ref 8.6–10.5)
CHLORIDE SERPL-SCNC: 114 MMOL/L (ref 98–107)
CO2 SERPL-SCNC: 19 MMOL/L (ref 22–29)
CREAT SERPL-MCNC: 0.78 MG/DL (ref 0.57–1)
DEPRECATED RDW RBC AUTO: 45.3 FL (ref 37–54)
ERYTHROCYTE [DISTWIDTH] IN BLOOD BY AUTOMATED COUNT: 13.6 % (ref 12.3–15.4)
GFR SERPL CREATININE-BSD FRML MDRD: 80 ML/MIN/1.73
GLOBULIN UR ELPH-MCNC: 3 GM/DL
GLUCOSE BLDC GLUCOMTR-MCNC: 137 MG/DL (ref 70–130)
GLUCOSE BLDC GLUCOMTR-MCNC: 154 MG/DL (ref 70–130)
GLUCOSE BLDC GLUCOMTR-MCNC: 159 MG/DL (ref 70–130)
GLUCOSE BLDC GLUCOMTR-MCNC: 168 MG/DL (ref 70–130)
GLUCOSE BLDC GLUCOMTR-MCNC: 175 MG/DL (ref 70–130)
GLUCOSE SERPL-MCNC: 168 MG/DL (ref 65–99)
HCO3 BLDA-SCNC: 20.7 MMOL/L (ref 20–26)
HCT VFR BLD AUTO: 33.5 % (ref 34–46.6)
HGB BLD-MCNC: 11.2 G/DL (ref 12–15.9)
INHALED O2 CONCENTRATION: 90 %
LYMPHOCYTES # BLD MANUAL: 0.6 10*3/MM3 (ref 0.7–3.1)
LYMPHOCYTES NFR BLD MANUAL: 2 % (ref 5–12)
LYMPHOCYTES NFR BLD MANUAL: 4 % (ref 19.6–45.3)
Lab: ABNORMAL
MAGNESIUM SERPL-MCNC: 2.2 MG/DL (ref 1.6–2.6)
MCH RBC QN AUTO: 30.6 PG (ref 26.6–33)
MCHC RBC AUTO-ENTMCNC: 33.4 G/DL (ref 31.5–35.7)
MCV RBC AUTO: 91.5 FL (ref 79–97)
METAMYELOCYTES NFR BLD MANUAL: 3 % (ref 0–0)
MODALITY: ABNORMAL
MONOCYTES # BLD AUTO: 0.3 10*3/MM3 (ref 0.1–0.9)
NEUTROPHILS # BLD AUTO: 13.62 10*3/MM3 (ref 1.7–7)
NEUTROPHILS NFR BLD MANUAL: 86.9 % (ref 42.7–76)
NEUTS BAND NFR BLD MANUAL: 4 % (ref 0–5)
PCO2 BLDA: 45.3 MM HG (ref 35–45)
PCO2 TEMP ADJ BLD: 45.3 MM HG (ref 35–45)
PEEP RESPIRATORY: 12 CM[H2O]
PH BLDA: 7.27 PH UNITS (ref 7.35–7.45)
PH, TEMP CORRECTED: 7.27 PH UNITS (ref 7.35–7.45)
PHOSPHATE SERPL-MCNC: 2.4 MG/DL (ref 2.5–4.5)
PLAT MORPH BLD: NORMAL
PLATELET # BLD AUTO: 220 10*3/MM3 (ref 140–450)
PMV BLD AUTO: 10 FL (ref 6–12)
PO2 BLDA: 193 MM HG (ref 83–108)
PO2 TEMP ADJ BLD: 193 MM HG (ref 83–108)
POIKILOCYTOSIS BLD QL SMEAR: ABNORMAL
POLYCHROMASIA BLD QL SMEAR: ABNORMAL
POTASSIUM SERPL-SCNC: 4.3 MMOL/L (ref 3.5–5.2)
PROT SERPL-MCNC: 5.3 G/DL (ref 6–8.5)
QT INTERVAL: 344 MS
QTC INTERVAL: 459 MS
RBC # BLD AUTO: 3.66 10*6/MM3 (ref 3.77–5.28)
SAO2 % BLDCOA: 99.3 % (ref 94–99)
SET MECH RESP RATE: 30
SODIUM SERPL-SCNC: 144 MMOL/L (ref 136–145)
VANCOMYCIN TROUGH SERPL-MCNC: 9.5 MCG/ML (ref 5–20)
VENTILATOR MODE: AC
VT ON VENT VENT: 400 ML
WBC # BLD AUTO: 14.98 10*3/MM3 (ref 3.4–10.8)
WBC MORPH BLD: NORMAL

## 2021-08-29 PROCEDURE — 25010000002 MEROPENEM PER 100 MG: Performed by: INTERNAL MEDICINE

## 2021-08-29 PROCEDURE — 63710000001 INSULIN REGULAR HUMAN PER 5 UNITS: Performed by: INTERNAL MEDICINE

## 2021-08-29 PROCEDURE — 80202 ASSAY OF VANCOMYCIN: CPT | Performed by: INTERNAL MEDICINE

## 2021-08-29 PROCEDURE — 25010000002 DEXAMETHASONE PER 1 MG: Performed by: INTERNAL MEDICINE

## 2021-08-29 PROCEDURE — 82803 BLOOD GASES ANY COMBINATION: CPT

## 2021-08-29 PROCEDURE — 25010000002 AZITHROMYCIN PER 500 MG: Performed by: INTERNAL MEDICINE

## 2021-08-29 PROCEDURE — 25010000002 PROPOFOL 10 MG/ML EMULSION: Performed by: INTERNAL MEDICINE

## 2021-08-29 PROCEDURE — 94799 UNLISTED PULMONARY SVC/PX: CPT

## 2021-08-29 PROCEDURE — 85025 COMPLETE CBC W/AUTO DIFF WBC: CPT | Performed by: INTERNAL MEDICINE

## 2021-08-29 PROCEDURE — 94003 VENT MGMT INPAT SUBQ DAY: CPT

## 2021-08-29 PROCEDURE — 25010000002 VANCOMYCIN 10 G RECONSTITUTED SOLUTION: Performed by: INTERNAL MEDICINE

## 2021-08-29 PROCEDURE — 80053 COMPREHEN METABOLIC PANEL: CPT | Performed by: INTERNAL MEDICINE

## 2021-08-29 PROCEDURE — 83735 ASSAY OF MAGNESIUM: CPT | Performed by: INTERNAL MEDICINE

## 2021-08-29 PROCEDURE — 93970 EXTREMITY STUDY: CPT | Performed by: SURGERY

## 2021-08-29 PROCEDURE — 25010000002 FENTANYL CITRATE (PF) 2500 MCG/50ML SOLUTION: Performed by: INTERNAL MEDICINE

## 2021-08-29 PROCEDURE — 99233 SBSQ HOSP IP/OBS HIGH 50: CPT | Performed by: INTERNAL MEDICINE

## 2021-08-29 PROCEDURE — 84100 ASSAY OF PHOSPHORUS: CPT | Performed by: INTERNAL MEDICINE

## 2021-08-29 PROCEDURE — 25010000002 SODIUM CHLORIDE 0.9 % WITH KCL 20 MEQ 20-0.9 MEQ/L-% SOLUTION: Performed by: INTERNAL MEDICINE

## 2021-08-29 PROCEDURE — 85007 BL SMEAR W/DIFF WBC COUNT: CPT | Performed by: INTERNAL MEDICINE

## 2021-08-29 PROCEDURE — 93970 EXTREMITY STUDY: CPT

## 2021-08-29 PROCEDURE — 25010000002 ENOXAPARIN PER 10 MG: Performed by: INTERNAL MEDICINE

## 2021-08-29 PROCEDURE — 82962 GLUCOSE BLOOD TEST: CPT

## 2021-08-29 RX ADMIN — INSULIN HUMAN 2 UNITS: 100 INJECTION, SOLUTION PARENTERAL at 17:58

## 2021-08-29 RX ADMIN — DEXAMETHASONE SODIUM PHOSPHATE 6 MG: 10 INJECTION INTRAMUSCULAR; INTRAVENOUS at 08:50

## 2021-08-29 RX ADMIN — ENOXAPARIN SODIUM 90 MG: 100 INJECTION SUBCUTANEOUS at 17:51

## 2021-08-29 RX ADMIN — FENTANYL CITRATE 300 MCG/HR: 50 INJECTION INTRAVENOUS at 22:35

## 2021-08-29 RX ADMIN — PROPOFOL 75 MCG/KG/MIN: 10 INJECTION, EMULSION INTRAVENOUS at 17:58

## 2021-08-29 RX ADMIN — INSULIN HUMAN 2 UNITS: 100 INJECTION, SOLUTION PARENTERAL at 00:49

## 2021-08-29 RX ADMIN — MINERAL OIL, PETROLATUM: 425; 568 OINTMENT OPHTHALMIC at 15:37

## 2021-08-29 RX ADMIN — PROPOFOL 74.28 MCG/KG/MIN: 10 INJECTION, EMULSION INTRAVENOUS at 23:01

## 2021-08-29 RX ADMIN — FAMOTIDINE 20 MG: 10 INJECTION INTRAVENOUS at 20:26

## 2021-08-29 RX ADMIN — GUAIFENESIN 400 MG: 100 SOLUTION ORAL at 23:02

## 2021-08-29 RX ADMIN — PROPOFOL 75 MCG/KG/MIN: 10 INJECTION, EMULSION INTRAVENOUS at 15:37

## 2021-08-29 RX ADMIN — ALBUTEROL SULFATE 6 PUFF: 108 INHALANT RESPIRATORY (INHALATION) at 07:00

## 2021-08-29 RX ADMIN — FAMOTIDINE 20 MG: 10 INJECTION INTRAVENOUS at 08:50

## 2021-08-29 RX ADMIN — FENTANYL CITRATE 300 MCG/HR: 50 INJECTION INTRAVENOUS at 15:37

## 2021-08-29 RX ADMIN — AZITHROMYCIN MONOHYDRATE 500 MG: 500 INJECTION, POWDER, LYOPHILIZED, FOR SOLUTION INTRAVENOUS at 17:51

## 2021-08-29 RX ADMIN — PROPOFOL 75 MCG/KG/MIN: 10 INJECTION, EMULSION INTRAVENOUS at 13:40

## 2021-08-29 RX ADMIN — MEROPENEM 1 G: 1 INJECTION, POWDER, FOR SOLUTION INTRAVENOUS at 14:15

## 2021-08-29 RX ADMIN — Medication 2 PACKET: at 09:04

## 2021-08-29 RX ADMIN — INSULIN HUMAN 2 UNITS: 100 INJECTION, SOLUTION PARENTERAL at 14:17

## 2021-08-29 RX ADMIN — MINERAL OIL, PETROLATUM: 425; 568 OINTMENT OPHTHALMIC at 20:25

## 2021-08-29 RX ADMIN — VANCOMYCIN HYDROCHLORIDE 1250 MG: 10 INJECTION, POWDER, LYOPHILIZED, FOR SOLUTION INTRAVENOUS at 02:30

## 2021-08-29 RX ADMIN — INSULIN HUMAN 2 UNITS: 100 INJECTION, SOLUTION PARENTERAL at 06:37

## 2021-08-29 RX ADMIN — CHLORHEXIDINE GLUCONATE 0.12% ORAL RINSE 15 ML: 1.2 LIQUID ORAL at 23:01

## 2021-08-29 RX ADMIN — FENTANYL CITRATE 300 MCG/HR: 50 INJECTION INTRAVENOUS at 06:06

## 2021-08-29 RX ADMIN — ALBUTEROL SULFATE 6 PUFF: 108 INHALANT RESPIRATORY (INHALATION) at 20:24

## 2021-08-29 RX ADMIN — PROPOFOL 74.83 MCG/KG/MIN: 10 INJECTION, EMULSION INTRAVENOUS at 07:35

## 2021-08-29 RX ADMIN — MINERAL OIL, PETROLATUM: 425; 568 OINTMENT OPHTHALMIC at 08:51

## 2021-08-29 RX ADMIN — PROPOFOL 74.83 MCG/KG/MIN: 10 INJECTION, EMULSION INTRAVENOUS at 00:27

## 2021-08-29 RX ADMIN — PROPOFOL 75 MCG/KG/MIN: 10 INJECTION, EMULSION INTRAVENOUS at 10:23

## 2021-08-29 RX ADMIN — MINERAL OIL, PETROLATUM: 425; 568 OINTMENT OPHTHALMIC at 23:12

## 2021-08-29 RX ADMIN — OXYCODONE HYDROCHLORIDE AND ACETAMINOPHEN 500 MG: 500 TABLET ORAL at 08:50

## 2021-08-29 RX ADMIN — MINERAL OIL, PETROLATUM: 425; 568 OINTMENT OPHTHALMIC at 13:41

## 2021-08-29 RX ADMIN — VANCOMYCIN HYDROCHLORIDE 1250 MG: 10 INJECTION, POWDER, LYOPHILIZED, FOR SOLUTION INTRAVENOUS at 14:15

## 2021-08-29 RX ADMIN — GUAIFENESIN 400 MG: 100 SOLUTION ORAL at 00:49

## 2021-08-29 RX ADMIN — GUAIFENESIN 400 MG: 100 SOLUTION ORAL at 13:40

## 2021-08-29 RX ADMIN — GUAIFENESIN 400 MG: 100 SOLUTION ORAL at 17:51

## 2021-08-29 RX ADMIN — MEROPENEM 1 G: 1 INJECTION, POWDER, FOR SOLUTION INTRAVENOUS at 20:26

## 2021-08-29 RX ADMIN — SODIUM CHLORIDE, PRESERVATIVE FREE 10 ML: 5 INJECTION INTRAVENOUS at 09:04

## 2021-08-29 RX ADMIN — ALBUTEROL SULFATE 6 PUFF: 108 INHALANT RESPIRATORY (INHALATION) at 14:05

## 2021-08-29 RX ADMIN — ENOXAPARIN SODIUM 90 MG: 100 INJECTION SUBCUTANEOUS at 06:34

## 2021-08-29 RX ADMIN — ZINC SULFATE 220 MG (50 MG) CAPSULE 220 MG: CAPSULE at 08:51

## 2021-08-29 RX ADMIN — CHLORHEXIDINE GLUCONATE 0.12% ORAL RINSE 15 ML: 1.2 LIQUID ORAL at 08:51

## 2021-08-29 RX ADMIN — PROPOFOL 75 MCG/KG/MIN: 10 INJECTION, EMULSION INTRAVENOUS at 03:04

## 2021-08-29 RX ADMIN — MEROPENEM 1 G: 1 INJECTION, POWDER, FOR SOLUTION INTRAVENOUS at 04:49

## 2021-08-29 RX ADMIN — GUAIFENESIN 400 MG: 100 SOLUTION ORAL at 06:33

## 2021-08-29 RX ADMIN — PROPOFOL 74.83 MCG/KG/MIN: 10 INJECTION, EMULSION INTRAVENOUS at 05:15

## 2021-08-29 RX ADMIN — Medication 1000 UNITS: at 08:50

## 2021-08-29 RX ADMIN — POTASSIUM CHLORIDE AND SODIUM CHLORIDE 75 ML/HR: 900; 150 INJECTION, SOLUTION INTRAVENOUS at 03:23

## 2021-08-29 RX ADMIN — MINERAL OIL, PETROLATUM: 425; 568 OINTMENT OPHTHALMIC at 03:23

## 2021-08-29 RX ADMIN — ALBUTEROL SULFATE 6 PUFF: 108 INHALANT RESPIRATORY (INHALATION) at 23:30

## 2021-08-29 RX ADMIN — MINERAL OIL, PETROLATUM: 425; 568 OINTMENT OPHTHALMIC at 00:49

## 2021-08-29 RX ADMIN — PROPOFOL 74.83 MCG/KG/MIN: 10 INJECTION, EMULSION INTRAVENOUS at 20:34

## 2021-08-30 ENCOUNTER — APPOINTMENT (OUTPATIENT)
Dept: GENERAL RADIOLOGY | Facility: HOSPITAL | Age: 46
End: 2021-08-30

## 2021-08-30 LAB
ALBUMIN SERPL-MCNC: 2 G/DL (ref 3.5–5.2)
ALBUMIN/GLOB SERPL: 0.6 G/DL
ALP SERPL-CCNC: 45 U/L (ref 39–117)
ALT SERPL W P-5'-P-CCNC: 127 U/L (ref 1–33)
ANION GAP SERPL CALCULATED.3IONS-SCNC: 7 MMOL/L (ref 5–15)
ARTERIAL PATENCY WRIST A: ABNORMAL
AST SERPL-CCNC: 62 U/L (ref 1–32)
ATMOSPHERIC PRESS: 750 MMHG
BACTERIA SPEC RESP CULT: ABNORMAL
BACTERIA SPEC RESP CULT: ABNORMAL
BASE EXCESS BLDA CALC-SCNC: -1.2 MMOL/L (ref 0–2)
BDY SITE: ABNORMAL
BILIRUB SERPL-MCNC: 0.4 MG/DL (ref 0–1.2)
BODY TEMPERATURE: 37 C
BUN SERPL-MCNC: 10 MG/DL (ref 6–20)
BUN/CREAT SERPL: 16.7 (ref 7–25)
CALCIUM SPEC-SCNC: 7.9 MG/DL (ref 8.6–10.5)
CHLORIDE SERPL-SCNC: 116 MMOL/L (ref 98–107)
CO2 SERPL-SCNC: 23 MMOL/L (ref 22–29)
CREAT SERPL-MCNC: 0.6 MG/DL (ref 0.57–1)
DEPRECATED RDW RBC AUTO: 44.8 FL (ref 37–54)
ERYTHROCYTE [DISTWIDTH] IN BLOOD BY AUTOMATED COUNT: 13.8 % (ref 12.3–15.4)
GFR SERPL CREATININE-BSD FRML MDRD: 109 ML/MIN/1.73
GLOBULIN UR ELPH-MCNC: 3.1 GM/DL
GLUCOSE BLDC GLUCOMTR-MCNC: 110 MG/DL (ref 70–130)
GLUCOSE BLDC GLUCOMTR-MCNC: 111 MG/DL (ref 70–130)
GLUCOSE BLDC GLUCOMTR-MCNC: 126 MG/DL (ref 70–130)
GLUCOSE BLDC GLUCOMTR-MCNC: 132 MG/DL (ref 70–130)
GLUCOSE BLDC GLUCOMTR-MCNC: 152 MG/DL (ref 70–130)
GLUCOSE SERPL-MCNC: 141 MG/DL (ref 65–99)
GRAM STN SPEC: ABNORMAL
HCO3 BLDA-SCNC: 23.9 MMOL/L (ref 20–26)
HCT VFR BLD AUTO: 31 % (ref 34–46.6)
HGB BLD-MCNC: 10.4 G/DL (ref 12–15.9)
INHALED O2 CONCENTRATION: 50 %
LYMPHOCYTES # BLD MANUAL: 0.45 10*3/MM3 (ref 0.7–3.1)
LYMPHOCYTES NFR BLD MANUAL: 1 % (ref 5–12)
LYMPHOCYTES NFR BLD MANUAL: 4 % (ref 19.6–45.3)
Lab: ABNORMAL
MAGNESIUM SERPL-MCNC: 2.3 MG/DL (ref 1.6–2.6)
MCH RBC QN AUTO: 30.6 PG (ref 26.6–33)
MCHC RBC AUTO-ENTMCNC: 33.5 G/DL (ref 31.5–35.7)
MCV RBC AUTO: 91.2 FL (ref 79–97)
METAMYELOCYTES NFR BLD MANUAL: 1 % (ref 0–0)
MODALITY: ABNORMAL
MONOCYTES # BLD AUTO: 0.11 10*3/MM3 (ref 0.1–0.9)
MYELOCYTES NFR BLD MANUAL: 2 % (ref 0–0)
NEUTROPHILS # BLD AUTO: 10.25 10*3/MM3 (ref 1.7–7)
NEUTROPHILS NFR BLD MANUAL: 90.9 % (ref 42.7–76)
NEUTS BAND NFR BLD MANUAL: 1 % (ref 0–5)
NRBC SPEC MANUAL: 1 /100 WBC (ref 0–0.2)
PCO2 BLDA: 40.5 MM HG (ref 35–45)
PCO2 TEMP ADJ BLD: 40.5 MM HG (ref 35–45)
PEEP RESPIRATORY: 12 CM[H2O]
PH BLDA: 7.38 PH UNITS (ref 7.35–7.45)
PH, TEMP CORRECTED: 7.38 PH UNITS (ref 7.35–7.45)
PHOSPHATE SERPL-MCNC: 2.3 MG/DL (ref 2.5–4.5)
PLAT MORPH BLD: NORMAL
PLATELET # BLD AUTO: 236 10*3/MM3 (ref 140–450)
PMV BLD AUTO: 10 FL (ref 6–12)
PO2 BLDA: 82.7 MM HG (ref 83–108)
PO2 TEMP ADJ BLD: 82.7 MM HG (ref 83–108)
POIKILOCYTOSIS BLD QL SMEAR: ABNORMAL
POLYCHROMASIA BLD QL SMEAR: ABNORMAL
POTASSIUM SERPL-SCNC: 4.2 MMOL/L (ref 3.5–5.2)
PROT SERPL-MCNC: 5.1 G/DL (ref 6–8.5)
RBC # BLD AUTO: 3.4 10*6/MM3 (ref 3.77–5.28)
SAO2 % BLDCOA: 96 % (ref 94–99)
SCHISTOCYTES BLD QL SMEAR: ABNORMAL
SET MECH RESP RATE: 30
SODIUM SERPL-SCNC: 146 MMOL/L (ref 136–145)
VENTILATOR MODE: AC
VT ON VENT VENT: 400 ML
WBC # BLD AUTO: 11.15 10*3/MM3 (ref 3.4–10.8)
WBC MORPH BLD: NORMAL

## 2021-08-30 PROCEDURE — 85007 BL SMEAR W/DIFF WBC COUNT: CPT | Performed by: INTERNAL MEDICINE

## 2021-08-30 PROCEDURE — 94003 VENT MGMT INPAT SUBQ DAY: CPT

## 2021-08-30 PROCEDURE — 74018 RADEX ABDOMEN 1 VIEW: CPT

## 2021-08-30 PROCEDURE — 25010000002 MEROPENEM PER 100 MG: Performed by: INTERNAL MEDICINE

## 2021-08-30 PROCEDURE — 99233 SBSQ HOSP IP/OBS HIGH 50: CPT | Performed by: INTERNAL MEDICINE

## 2021-08-30 PROCEDURE — 94799 UNLISTED PULMONARY SVC/PX: CPT

## 2021-08-30 PROCEDURE — 25010000002 ENOXAPARIN PER 10 MG: Performed by: INTERNAL MEDICINE

## 2021-08-30 PROCEDURE — 25010000002 DEXAMETHASONE PER 1 MG: Performed by: INTERNAL MEDICINE

## 2021-08-30 PROCEDURE — 83735 ASSAY OF MAGNESIUM: CPT | Performed by: INTERNAL MEDICINE

## 2021-08-30 PROCEDURE — 25010000002 AZITHROMYCIN PER 500 MG: Performed by: INTERNAL MEDICINE

## 2021-08-30 PROCEDURE — 82803 BLOOD GASES ANY COMBINATION: CPT

## 2021-08-30 PROCEDURE — 82962 GLUCOSE BLOOD TEST: CPT

## 2021-08-30 PROCEDURE — 25010000002 FENTANYL CITRATE (PF) 2500 MCG/50ML SOLUTION: Performed by: INTERNAL MEDICINE

## 2021-08-30 PROCEDURE — 25010000002 VANCOMYCIN 10 G RECONSTITUTED SOLUTION: Performed by: INTERNAL MEDICINE

## 2021-08-30 PROCEDURE — 84100 ASSAY OF PHOSPHORUS: CPT | Performed by: INTERNAL MEDICINE

## 2021-08-30 PROCEDURE — 25010000002 FENTANYL 10 MCG/1 ML NS: Performed by: INTERNAL MEDICINE

## 2021-08-30 PROCEDURE — 85025 COMPLETE CBC W/AUTO DIFF WBC: CPT | Performed by: INTERNAL MEDICINE

## 2021-08-30 PROCEDURE — 80053 COMPREHEN METABOLIC PANEL: CPT | Performed by: INTERNAL MEDICINE

## 2021-08-30 PROCEDURE — 25010000002 PROPOFOL 10 MG/ML EMULSION: Performed by: INTERNAL MEDICINE

## 2021-08-30 RX ADMIN — DEXAMETHASONE SODIUM PHOSPHATE 6 MG: 10 INJECTION INTRAMUSCULAR; INTRAVENOUS at 09:45

## 2021-08-30 RX ADMIN — SODIUM CHLORIDE, PRESERVATIVE FREE 10 ML: 5 INJECTION INTRAVENOUS at 09:45

## 2021-08-30 RX ADMIN — CHLORHEXIDINE GLUCONATE 0.12% ORAL RINSE 15 ML: 1.2 LIQUID ORAL at 09:45

## 2021-08-30 RX ADMIN — ALBUTEROL SULFATE 6 PUFF: 108 INHALANT RESPIRATORY (INHALATION) at 13:29

## 2021-08-30 RX ADMIN — PROPOFOL 70 MCG/KG/MIN: 10 INJECTION, EMULSION INTRAVENOUS at 14:13

## 2021-08-30 RX ADMIN — PROPOFOL 65 MCG/KG/MIN: 10 INJECTION, EMULSION INTRAVENOUS at 17:29

## 2021-08-30 RX ADMIN — ALBUTEROL SULFATE 6 PUFF: 108 INHALANT RESPIRATORY (INHALATION) at 18:19

## 2021-08-30 RX ADMIN — FAMOTIDINE 20 MG: 10 INJECTION INTRAVENOUS at 09:45

## 2021-08-30 RX ADMIN — Medication 1000 UNITS: at 09:45

## 2021-08-30 RX ADMIN — ALBUTEROL SULFATE 6 PUFF: 108 INHALANT RESPIRATORY (INHALATION) at 06:08

## 2021-08-30 RX ADMIN — ALBUTEROL SULFATE 6 PUFF: 108 INHALANT RESPIRATORY (INHALATION) at 23:43

## 2021-08-30 RX ADMIN — MEROPENEM 1 G: 1 INJECTION, POWDER, FOR SOLUTION INTRAVENOUS at 04:10

## 2021-08-30 RX ADMIN — MEROPENEM 1 G: 1 INJECTION, POWDER, FOR SOLUTION INTRAVENOUS at 20:29

## 2021-08-30 RX ADMIN — MINERAL OIL, PETROLATUM: 425; 568 OINTMENT OPHTHALMIC at 17:10

## 2021-08-30 RX ADMIN — MINERAL OIL, PETROLATUM: 425; 568 OINTMENT OPHTHALMIC at 11:31

## 2021-08-30 RX ADMIN — PROPOFOL 74.9 MCG/KG/MIN: 10 INJECTION, EMULSION INTRAVENOUS at 01:34

## 2021-08-30 RX ADMIN — ZINC SULFATE 220 MG (50 MG) CAPSULE 220 MG: CAPSULE at 09:45

## 2021-08-30 RX ADMIN — FENTANYL CITRATE 300 MCG/HR: 50 INJECTION INTRAVENOUS at 22:00

## 2021-08-30 RX ADMIN — VANCOMYCIN HYDROCHLORIDE 1250 MG: 10 INJECTION, POWDER, LYOPHILIZED, FOR SOLUTION INTRAVENOUS at 01:41

## 2021-08-30 RX ADMIN — FAMOTIDINE 20 MG: 10 INJECTION INTRAVENOUS at 20:29

## 2021-08-30 RX ADMIN — CHLORHEXIDINE GLUCONATE 0.12% ORAL RINSE 15 ML: 1.2 LIQUID ORAL at 20:29

## 2021-08-30 RX ADMIN — SODIUM CHLORIDE, PRESERVATIVE FREE 10 ML: 5 INJECTION INTRAVENOUS at 20:29

## 2021-08-30 RX ADMIN — AZITHROMYCIN MONOHYDRATE 500 MG: 500 INJECTION, POWDER, LYOPHILIZED, FOR SOLUTION INTRAVENOUS at 17:05

## 2021-08-30 RX ADMIN — PROPOFOL 74 MCG/KG/MIN: 10 INJECTION, EMULSION INTRAVENOUS at 08:06

## 2021-08-30 RX ADMIN — PROPOFOL 65 MCG/KG/MIN: 10 INJECTION, EMULSION INTRAVENOUS at 20:30

## 2021-08-30 RX ADMIN — GUAIFENESIN 400 MG: 100 SOLUTION ORAL at 11:30

## 2021-08-30 RX ADMIN — FENTANYL CITRATE 300 MCG/HR: 50 INJECTION INTRAVENOUS at 15:58

## 2021-08-30 RX ADMIN — PROPOFOL 74.83 MCG/KG/MIN: 10 INJECTION, EMULSION INTRAVENOUS at 03:05

## 2021-08-30 RX ADMIN — ENOXAPARIN SODIUM 90 MG: 100 INJECTION SUBCUTANEOUS at 05:30

## 2021-08-30 RX ADMIN — PROPOFOL 65 MCG/KG/MIN: 10 INJECTION, EMULSION INTRAVENOUS at 23:21

## 2021-08-30 RX ADMIN — PROPOFOL 74.65 MCG/KG/MIN: 10 INJECTION, EMULSION INTRAVENOUS at 05:31

## 2021-08-30 RX ADMIN — MINERAL OIL, PETROLATUM: 425; 568 OINTMENT OPHTHALMIC at 03:07

## 2021-08-30 RX ADMIN — GUAIFENESIN 400 MG: 100 SOLUTION ORAL at 17:09

## 2021-08-30 RX ADMIN — OXYCODONE HYDROCHLORIDE AND ACETAMINOPHEN 500 MG: 500 TABLET ORAL at 09:45

## 2021-08-30 RX ADMIN — FENTANYL CITRATE 300 MCG/HR: 50 INJECTION INTRAVENOUS at 06:43

## 2021-08-30 RX ADMIN — MINERAL OIL, PETROLATUM: 425; 568 OINTMENT OPHTHALMIC at 09:45

## 2021-08-30 RX ADMIN — MINERAL OIL, PETROLATUM: 425; 568 OINTMENT OPHTHALMIC at 20:29

## 2021-08-30 RX ADMIN — PROPOFOL 70 MCG/KG/MIN: 10 INJECTION, EMULSION INTRAVENOUS at 11:30

## 2021-08-30 RX ADMIN — MEROPENEM 1 G: 1 INJECTION, POWDER, FOR SOLUTION INTRAVENOUS at 11:31

## 2021-08-30 RX ADMIN — GUAIFENESIN 400 MG: 100 SOLUTION ORAL at 05:31

## 2021-08-30 RX ADMIN — ENOXAPARIN SODIUM 90 MG: 100 INJECTION SUBCUTANEOUS at 17:09

## 2021-08-31 ENCOUNTER — APPOINTMENT (OUTPATIENT)
Dept: GENERAL RADIOLOGY | Facility: HOSPITAL | Age: 46
End: 2021-08-31

## 2021-08-31 LAB
ALBUMIN SERPL-MCNC: 2.1 G/DL (ref 3.5–5.2)
ALBUMIN/GLOB SERPL: 0.8 G/DL
ALP SERPL-CCNC: 56 U/L (ref 39–117)
ALT SERPL W P-5'-P-CCNC: 122 U/L (ref 1–33)
ANION GAP SERPL CALCULATED.3IONS-SCNC: 13 MMOL/L (ref 5–15)
ARTERIAL PATENCY WRIST A: ABNORMAL
AST SERPL-CCNC: 83 U/L (ref 1–32)
ATMOSPHERIC PRESS: 743 MMHG
BASE EXCESS BLDA CALC-SCNC: 1.9 MMOL/L (ref 0–2)
BDY SITE: ABNORMAL
BILIRUB SERPL-MCNC: 0.5 MG/DL (ref 0–1.2)
BODY TEMPERATURE: 37 C
BUN SERPL-MCNC: 12 MG/DL (ref 6–20)
BUN/CREAT SERPL: 20.7 (ref 7–25)
CALCIUM SPEC-SCNC: 8 MG/DL (ref 8.6–10.5)
CHLORIDE SERPL-SCNC: 112 MMOL/L (ref 98–107)
CO2 SERPL-SCNC: 25 MMOL/L (ref 22–29)
CREAT SERPL-MCNC: 0.58 MG/DL (ref 0.57–1)
CRP SERPL-MCNC: 4.67 MG/DL (ref 0–0.5)
DEPRECATED RDW RBC AUTO: 45 FL (ref 37–54)
EOSINOPHIL # BLD MANUAL: 0.11 10*3/MM3 (ref 0–0.4)
EOSINOPHIL NFR BLD MANUAL: 1 % (ref 0.3–6.2)
ERYTHROCYTE [DISTWIDTH] IN BLOOD BY AUTOMATED COUNT: 13.8 % (ref 12.3–15.4)
GFR SERPL CREATININE-BSD FRML MDRD: 113 ML/MIN/1.73
GLOBULIN UR ELPH-MCNC: 2.7 GM/DL
GLUCOSE BLDC GLUCOMTR-MCNC: 114 MG/DL (ref 70–130)
GLUCOSE BLDC GLUCOMTR-MCNC: 186 MG/DL (ref 70–130)
GLUCOSE SERPL-MCNC: 101 MG/DL (ref 65–99)
HCO3 BLDA-SCNC: 27.3 MMOL/L (ref 20–26)
HCT VFR BLD AUTO: 31.9 % (ref 34–46.6)
HGB BLD-MCNC: 10.3 G/DL (ref 12–15.9)
INHALED O2 CONCENTRATION: 50 %
LYMPHOCYTES # BLD MANUAL: 0.97 10*3/MM3 (ref 0.7–3.1)
LYMPHOCYTES NFR BLD MANUAL: 8 % (ref 19.6–45.3)
Lab: ABNORMAL
MCH RBC QN AUTO: 29.6 PG (ref 26.6–33)
MCHC RBC AUTO-ENTMCNC: 32.3 G/DL (ref 31.5–35.7)
MCV RBC AUTO: 91.7 FL (ref 79–97)
METAMYELOCYTES NFR BLD MANUAL: 2 % (ref 0–0)
MODALITY: ABNORMAL
MYELOCYTES NFR BLD MANUAL: 1 % (ref 0–0)
NEUTROPHILS # BLD AUTO: 9.41 10*3/MM3 (ref 1.7–7)
NEUTROPHILS NFR BLD MANUAL: 86 % (ref 42.7–76)
NEUTS BAND NFR BLD MANUAL: 1 % (ref 0–5)
PCO2 BLDA: 45.1 MM HG (ref 35–45)
PCO2 TEMP ADJ BLD: 45.1 MM HG (ref 35–45)
PEEP RESPIRATORY: 10 CM[H2O]
PH BLDA: 7.39 PH UNITS (ref 7.35–7.45)
PH, TEMP CORRECTED: 7.39 PH UNITS (ref 7.35–7.45)
PLAT MORPH BLD: NORMAL
PLATELET # BLD AUTO: 266 10*3/MM3 (ref 140–450)
PMV BLD AUTO: 10.3 FL (ref 6–12)
PO2 BLDA: 79.3 MM HG (ref 83–108)
PO2 TEMP ADJ BLD: 79.3 MM HG (ref 83–108)
POLYCHROMASIA BLD QL SMEAR: ABNORMAL
POTASSIUM SERPL-SCNC: 4.4 MMOL/L (ref 3.5–5.2)
PROCALCITONIN SERPL-MCNC: 0.53 NG/ML (ref 0–0.25)
PROT SERPL-MCNC: 4.8 G/DL (ref 6–8.5)
RBC # BLD AUTO: 3.48 10*6/MM3 (ref 3.77–5.28)
SAO2 % BLDCOA: 95.4 % (ref 94–99)
SET MECH RESP RATE: 30
SODIUM SERPL-SCNC: 150 MMOL/L (ref 136–145)
VARIANT LYMPHS NFR BLD MANUAL: 1 % (ref 0–5)
VENTILATOR MODE: AC
VT ON VENT VENT: 400 ML
WBC # BLD AUTO: 10.82 10*3/MM3 (ref 3.4–10.8)
WBC MORPH BLD: NORMAL

## 2021-08-31 PROCEDURE — 99233 SBSQ HOSP IP/OBS HIGH 50: CPT | Performed by: INTERNAL MEDICINE

## 2021-08-31 PROCEDURE — 94799 UNLISTED PULMONARY SVC/PX: CPT

## 2021-08-31 PROCEDURE — 86140 C-REACTIVE PROTEIN: CPT | Performed by: INTERNAL MEDICINE

## 2021-08-31 PROCEDURE — 25010000002 PROPOFOL 1000 MG/100ML EMULSION: Performed by: INTERNAL MEDICINE

## 2021-08-31 PROCEDURE — 25010000002 FENTANYL 10 MCG/1 ML NS: Performed by: INTERNAL MEDICINE

## 2021-08-31 PROCEDURE — 25010000002 ENOXAPARIN PER 10 MG: Performed by: INTERNAL MEDICINE

## 2021-08-31 PROCEDURE — 25010000002 MEROPENEM PER 100 MG: Performed by: INTERNAL MEDICINE

## 2021-08-31 PROCEDURE — 82803 BLOOD GASES ANY COMBINATION: CPT

## 2021-08-31 PROCEDURE — 25010000002 PROPOFOL 10 MG/ML EMULSION: Performed by: INTERNAL MEDICINE

## 2021-08-31 PROCEDURE — 25010000002 DEXAMETHASONE PER 1 MG: Performed by: INTERNAL MEDICINE

## 2021-08-31 PROCEDURE — 80053 COMPREHEN METABOLIC PANEL: CPT | Performed by: INTERNAL MEDICINE

## 2021-08-31 PROCEDURE — 63710000001 INSULIN REGULAR HUMAN PER 5 UNITS: Performed by: INTERNAL MEDICINE

## 2021-08-31 PROCEDURE — 84145 PROCALCITONIN (PCT): CPT | Performed by: INTERNAL MEDICINE

## 2021-08-31 PROCEDURE — 85007 BL SMEAR W/DIFF WBC COUNT: CPT | Performed by: INTERNAL MEDICINE

## 2021-08-31 PROCEDURE — 82962 GLUCOSE BLOOD TEST: CPT

## 2021-08-31 PROCEDURE — 85025 COMPLETE CBC W/AUTO DIFF WBC: CPT | Performed by: INTERNAL MEDICINE

## 2021-08-31 PROCEDURE — 94003 VENT MGMT INPAT SUBQ DAY: CPT

## 2021-08-31 PROCEDURE — 71045 X-RAY EXAM CHEST 1 VIEW: CPT

## 2021-08-31 PROCEDURE — 25010000002 AZITHROMYCIN PER 500 MG: Performed by: INTERNAL MEDICINE

## 2021-08-31 RX ORDER — BISACODYL 10 MG
10 SUPPOSITORY, RECTAL RECTAL ONCE
Status: COMPLETED | OUTPATIENT
Start: 2021-08-31 | End: 2021-08-31

## 2021-08-31 RX ORDER — DEXTROSE MONOHYDRATE 50 MG/ML
75 INJECTION, SOLUTION INTRAVENOUS CONTINUOUS
Status: DISCONTINUED | OUTPATIENT
Start: 2021-08-31 | End: 2021-09-05

## 2021-08-31 RX ADMIN — FENTANYL CITRATE 300 MCG/HR: 50 INJECTION INTRAVENOUS at 14:14

## 2021-08-31 RX ADMIN — FAMOTIDINE 20 MG: 10 INJECTION INTRAVENOUS at 20:17

## 2021-08-31 RX ADMIN — ALBUTEROL SULFATE 6 PUFF: 108 INHALANT RESPIRATORY (INHALATION) at 23:32

## 2021-08-31 RX ADMIN — PROPOFOL 55 MCG/KG/MIN: 10 INJECTION, EMULSION INTRAVENOUS at 21:47

## 2021-08-31 RX ADMIN — DEXTROSE MONOHYDRATE 75 ML/HR: 50 INJECTION, SOLUTION INTRAVENOUS at 12:10

## 2021-08-31 RX ADMIN — CHLORHEXIDINE GLUCONATE 0.12% ORAL RINSE 15 ML: 1.2 LIQUID ORAL at 20:17

## 2021-08-31 RX ADMIN — SODIUM CHLORIDE, PRESERVATIVE FREE 10 ML: 5 INJECTION INTRAVENOUS at 20:18

## 2021-08-31 RX ADMIN — ENOXAPARIN SODIUM 90 MG: 100 INJECTION SUBCUTANEOUS at 05:24

## 2021-08-31 RX ADMIN — GUAIFENESIN 400 MG: 100 SOLUTION ORAL at 05:23

## 2021-08-31 RX ADMIN — ALBUTEROL SULFATE 6 PUFF: 108 INHALANT RESPIRATORY (INHALATION) at 07:41

## 2021-08-31 RX ADMIN — PROPOFOL 60 MCG/KG/MIN: 10 INJECTION, EMULSION INTRAVENOUS at 02:14

## 2021-08-31 RX ADMIN — MINERAL OIL, PETROLATUM: 425; 568 OINTMENT OPHTHALMIC at 20:17

## 2021-08-31 RX ADMIN — GUAIFENESIN 400 MG: 100 SOLUTION ORAL at 00:12

## 2021-08-31 RX ADMIN — PROPOFOL 60 MCG/KG/MIN: 10 INJECTION, EMULSION INTRAVENOUS at 18:28

## 2021-08-31 RX ADMIN — PROPOFOL 60 MCG/KG/MIN: 10 INJECTION, EMULSION INTRAVENOUS at 13:21

## 2021-08-31 RX ADMIN — ZINC SULFATE 220 MG (50 MG) CAPSULE 220 MG: CAPSULE at 10:19

## 2021-08-31 RX ADMIN — MINERAL OIL, PETROLATUM: 425; 568 OINTMENT OPHTHALMIC at 16:04

## 2021-08-31 RX ADMIN — INSULIN HUMAN 2 UNITS: 100 INJECTION, SOLUTION PARENTERAL at 18:30

## 2021-08-31 RX ADMIN — DEXAMETHASONE SODIUM PHOSPHATE 6 MG: 10 INJECTION INTRAMUSCULAR; INTRAVENOUS at 09:01

## 2021-08-31 RX ADMIN — BISACODYL 10 MG: 10 SUPPOSITORY RECTAL at 12:11

## 2021-08-31 RX ADMIN — CHLORHEXIDINE GLUCONATE 0.12% ORAL RINSE 15 ML: 1.2 LIQUID ORAL at 09:01

## 2021-08-31 RX ADMIN — ALBUTEROL SULFATE 6 PUFF: 108 INHALANT RESPIRATORY (INHALATION) at 12:20

## 2021-08-31 RX ADMIN — PROPOFOL 60 MCG/KG/MIN: 10 INJECTION, EMULSION INTRAVENOUS at 07:45

## 2021-08-31 RX ADMIN — GUAIFENESIN 400 MG: 100 SOLUTION ORAL at 18:28

## 2021-08-31 RX ADMIN — GUAIFENESIN 400 MG: 100 SOLUTION ORAL at 12:10

## 2021-08-31 RX ADMIN — MINERAL OIL, PETROLATUM: 425; 568 OINTMENT OPHTHALMIC at 09:00

## 2021-08-31 RX ADMIN — MINERAL OIL, PETROLATUM: 425; 568 OINTMENT OPHTHALMIC at 03:26

## 2021-08-31 RX ADMIN — PROPOFOL 60 MCG/KG/MIN: 10 INJECTION, EMULSION INTRAVENOUS at 10:19

## 2021-08-31 RX ADMIN — AZITHROMYCIN MONOHYDRATE 500 MG: 500 INJECTION, POWDER, LYOPHILIZED, FOR SOLUTION INTRAVENOUS at 18:28

## 2021-08-31 RX ADMIN — PROPOFOL 60 MCG/KG/MIN: 10 INJECTION, EMULSION INTRAVENOUS at 16:04

## 2021-08-31 RX ADMIN — MEROPENEM 1 G: 1 INJECTION, POWDER, FOR SOLUTION INTRAVENOUS at 12:10

## 2021-08-31 RX ADMIN — OXYCODONE HYDROCHLORIDE AND ACETAMINOPHEN 500 MG: 500 TABLET ORAL at 10:20

## 2021-08-31 RX ADMIN — FENTANYL CITRATE 300 MCG/HR: 50 INJECTION INTRAVENOUS at 06:24

## 2021-08-31 RX ADMIN — Medication 1000 UNITS: at 10:20

## 2021-08-31 RX ADMIN — ENOXAPARIN SODIUM 90 MG: 100 INJECTION SUBCUTANEOUS at 18:28

## 2021-08-31 RX ADMIN — MINERAL OIL, PETROLATUM: 425; 568 OINTMENT OPHTHALMIC at 00:12

## 2021-08-31 RX ADMIN — MEROPENEM 1 G: 1 INJECTION, POWDER, FOR SOLUTION INTRAVENOUS at 03:26

## 2021-08-31 RX ADMIN — FAMOTIDINE 20 MG: 10 INJECTION INTRAVENOUS at 09:01

## 2021-08-31 RX ADMIN — MEROPENEM 1 G: 1 INJECTION, POWDER, FOR SOLUTION INTRAVENOUS at 20:17

## 2021-08-31 RX ADMIN — MINERAL OIL, PETROLATUM: 425; 568 OINTMENT OPHTHALMIC at 12:11

## 2021-08-31 RX ADMIN — PROPOFOL 60 MCG/KG/MIN: 10 INJECTION, EMULSION INTRAVENOUS at 05:23

## 2021-08-31 RX ADMIN — ALBUTEROL SULFATE 6 PUFF: 108 INHALANT RESPIRATORY (INHALATION) at 19:55

## 2021-08-31 RX ADMIN — SODIUM CHLORIDE, PRESERVATIVE FREE 10 ML: 5 INJECTION INTRAVENOUS at 09:01

## 2021-09-01 ENCOUNTER — APPOINTMENT (OUTPATIENT)
Dept: GENERAL RADIOLOGY | Facility: HOSPITAL | Age: 46
End: 2021-09-01

## 2021-09-01 LAB
ARTERIAL PATENCY WRIST A: ABNORMAL
ATMOSPHERIC PRESS: 743 MMHG
BACTERIA SPEC AEROBE CULT: NORMAL
BACTERIA SPEC AEROBE CULT: NORMAL
BASE EXCESS BLDA CALC-SCNC: 3.6 MMOL/L (ref 0–2)
BDY SITE: ABNORMAL
BODY TEMPERATURE: 37 C
GLUCOSE BLDC GLUCOMTR-MCNC: 111 MG/DL (ref 70–130)
GLUCOSE BLDC GLUCOMTR-MCNC: 111 MG/DL (ref 70–130)
GLUCOSE BLDC GLUCOMTR-MCNC: 183 MG/DL (ref 70–130)
GLUCOSE BLDC GLUCOMTR-MCNC: 192 MG/DL (ref 70–130)
HCO3 BLDA-SCNC: 28.8 MMOL/L (ref 20–26)
INHALED O2 CONCENTRATION: 40 %
Lab: ABNORMAL
MODALITY: ABNORMAL
PCO2 BLDA: 45.4 MM HG (ref 35–45)
PCO2 TEMP ADJ BLD: 45.4 MM HG (ref 35–45)
PEEP RESPIRATORY: 8 CM[H2O]
PH BLDA: 7.41 PH UNITS (ref 7.35–7.45)
PH, TEMP CORRECTED: 7.41 PH UNITS (ref 7.35–7.45)
PO2 BLDA: 71.2 MM HG (ref 83–108)
PO2 TEMP ADJ BLD: 71.2 MM HG (ref 83–108)
SAO2 % BLDCOA: 94.2 % (ref 94–99)
SET MECH RESP RATE: 26
VENTILATOR MODE: AC
VT ON VENT VENT: 400 ML

## 2021-09-01 PROCEDURE — 63710000001 INSULIN REGULAR HUMAN PER 5 UNITS: Performed by: INTERNAL MEDICINE

## 2021-09-01 PROCEDURE — 25010000002 MEROPENEM PER 100 MG: Performed by: INTERNAL MEDICINE

## 2021-09-01 PROCEDURE — 94003 VENT MGMT INPAT SUBQ DAY: CPT

## 2021-09-01 PROCEDURE — 82803 BLOOD GASES ANY COMBINATION: CPT

## 2021-09-01 PROCEDURE — 25010000002 FENTANYL CITRATE (PF) 2500 MCG/50ML SOLUTION: Performed by: INTERNAL MEDICINE

## 2021-09-01 PROCEDURE — 25010000002 PROPOFOL 10 MG/ML EMULSION: Performed by: INTERNAL MEDICINE

## 2021-09-01 PROCEDURE — 94799 UNLISTED PULMONARY SVC/PX: CPT

## 2021-09-01 PROCEDURE — 25010000002 ENOXAPARIN PER 10 MG: Performed by: INTERNAL MEDICINE

## 2021-09-01 PROCEDURE — 25010000002 DEXAMETHASONE PER 1 MG: Performed by: INTERNAL MEDICINE

## 2021-09-01 PROCEDURE — 74018 RADEX ABDOMEN 1 VIEW: CPT

## 2021-09-01 PROCEDURE — 25010000002 AZITHROMYCIN PER 500 MG: Performed by: INTERNAL MEDICINE

## 2021-09-01 PROCEDURE — 82962 GLUCOSE BLOOD TEST: CPT

## 2021-09-01 PROCEDURE — 99233 SBSQ HOSP IP/OBS HIGH 50: CPT | Performed by: INTERNAL MEDICINE

## 2021-09-01 RX ADMIN — MINERAL OIL, PETROLATUM: 425; 568 OINTMENT OPHTHALMIC at 04:33

## 2021-09-01 RX ADMIN — PROPOFOL 55 MCG/KG/MIN: 10 INJECTION, EMULSION INTRAVENOUS at 01:19

## 2021-09-01 RX ADMIN — PROPOFOL 60 MCG/KG/MIN: 10 INJECTION, EMULSION INTRAVENOUS at 18:27

## 2021-09-01 RX ADMIN — SODIUM CHLORIDE, PRESERVATIVE FREE 10 ML: 5 INJECTION INTRAVENOUS at 12:16

## 2021-09-01 RX ADMIN — FENTANYL CITRATE 250 MCG/HR: 50 INJECTION INTRAVENOUS at 11:12

## 2021-09-01 RX ADMIN — MINERAL OIL, PETROLATUM: 425; 568 OINTMENT OPHTHALMIC at 00:01

## 2021-09-01 RX ADMIN — ENOXAPARIN SODIUM 90 MG: 100 INJECTION SUBCUTANEOUS at 06:18

## 2021-09-01 RX ADMIN — GUAIFENESIN 400 MG: 100 SOLUTION ORAL at 11:12

## 2021-09-01 RX ADMIN — ENOXAPARIN SODIUM 90 MG: 100 INJECTION SUBCUTANEOUS at 18:28

## 2021-09-01 RX ADMIN — MEROPENEM 1 G: 1 INJECTION, POWDER, FOR SOLUTION INTRAVENOUS at 11:13

## 2021-09-01 RX ADMIN — CHLORHEXIDINE GLUCONATE 0.12% ORAL RINSE 15 ML: 1.2 LIQUID ORAL at 20:18

## 2021-09-01 RX ADMIN — PROPOFOL 55 MCG/KG/MIN: 10 INJECTION, EMULSION INTRAVENOUS at 20:18

## 2021-09-01 RX ADMIN — GUAIFENESIN 400 MG: 100 SOLUTION ORAL at 06:19

## 2021-09-01 RX ADMIN — GUAIFENESIN 400 MG: 100 SOLUTION ORAL at 00:00

## 2021-09-01 RX ADMIN — INSULIN HUMAN 2 UNITS: 100 INJECTION, SOLUTION PARENTERAL at 18:58

## 2021-09-01 RX ADMIN — FAMOTIDINE 20 MG: 10 INJECTION INTRAVENOUS at 08:28

## 2021-09-01 RX ADMIN — MINERAL OIL, PETROLATUM: 425; 568 OINTMENT OPHTHALMIC at 08:41

## 2021-09-01 RX ADMIN — FENTANYL CITRATE 300 MCG/HR: 50 INJECTION INTRAVENOUS at 01:09

## 2021-09-01 RX ADMIN — MINERAL OIL, PETROLATUM: 425; 568 OINTMENT OPHTHALMIC at 18:27

## 2021-09-01 RX ADMIN — ALBUTEROL SULFATE 6 PUFF: 108 INHALANT RESPIRATORY (INHALATION) at 12:11

## 2021-09-01 RX ADMIN — PROPOFOL 60 MCG/KG/MIN: 10 INJECTION, EMULSION INTRAVENOUS at 15:10

## 2021-09-01 RX ADMIN — GUAIFENESIN 400 MG: 100 SOLUTION ORAL at 18:28

## 2021-09-01 RX ADMIN — Medication 1000 UNITS: at 08:30

## 2021-09-01 RX ADMIN — PROPOFOL 60 MCG/KG/MIN: 10 INJECTION, EMULSION INTRAVENOUS at 12:15

## 2021-09-01 RX ADMIN — MEROPENEM 1 G: 1 INJECTION, POWDER, FOR SOLUTION INTRAVENOUS at 04:33

## 2021-09-01 RX ADMIN — MINERAL OIL, PETROLATUM: 425; 568 OINTMENT OPHTHALMIC at 12:16

## 2021-09-01 RX ADMIN — DEXAMETHASONE SODIUM PHOSPHATE 6 MG: 10 INJECTION INTRAMUSCULAR; INTRAVENOUS at 08:28

## 2021-09-01 RX ADMIN — ZINC SULFATE 220 MG (50 MG) CAPSULE 220 MG: CAPSULE at 08:28

## 2021-09-01 RX ADMIN — OXYCODONE HYDROCHLORIDE AND ACETAMINOPHEN 500 MG: 500 TABLET ORAL at 08:28

## 2021-09-01 RX ADMIN — FENTANYL CITRATE 250 MCG/HR: 50 INJECTION INTRAVENOUS at 21:57

## 2021-09-01 RX ADMIN — SODIUM CHLORIDE, PRESERVATIVE FREE 10 ML: 5 INJECTION INTRAVENOUS at 20:18

## 2021-09-01 RX ADMIN — PROPOFOL 55 MCG/KG/MIN: 10 INJECTION, EMULSION INTRAVENOUS at 05:04

## 2021-09-01 RX ADMIN — AZITHROMYCIN MONOHYDRATE 500 MG: 500 INJECTION, POWDER, LYOPHILIZED, FOR SOLUTION INTRAVENOUS at 18:28

## 2021-09-01 RX ADMIN — MINERAL OIL, PETROLATUM: 425; 568 OINTMENT OPHTHALMIC at 20:18

## 2021-09-01 RX ADMIN — CHLORHEXIDINE GLUCONATE 0.12% ORAL RINSE 15 ML: 1.2 LIQUID ORAL at 08:28

## 2021-09-01 RX ADMIN — ALBUTEROL SULFATE 6 PUFF: 108 INHALANT RESPIRATORY (INHALATION) at 20:13

## 2021-09-01 RX ADMIN — FAMOTIDINE 20 MG: 10 INJECTION INTRAVENOUS at 20:18

## 2021-09-01 RX ADMIN — INSULIN HUMAN 2 UNITS: 100 INJECTION, SOLUTION PARENTERAL at 00:21

## 2021-09-01 RX ADMIN — PROPOFOL 55 MCG/KG/MIN: 10 INJECTION, EMULSION INTRAVENOUS at 08:27

## 2021-09-01 RX ADMIN — ALBUTEROL SULFATE 6 PUFF: 108 INHALANT RESPIRATORY (INHALATION) at 07:58

## 2021-09-02 LAB
ALBUMIN SERPL-MCNC: 2.2 G/DL (ref 3.5–5.2)
ALBUMIN/GLOB SERPL: 0.7 G/DL
ALP SERPL-CCNC: 47 U/L (ref 39–117)
ALT SERPL W P-5'-P-CCNC: 66 U/L (ref 1–33)
ANION GAP SERPL CALCULATED.3IONS-SCNC: 5 MMOL/L (ref 5–15)
AST SERPL-CCNC: 37 U/L (ref 1–32)
BILIRUB SERPL-MCNC: 0.5 MG/DL (ref 0–1.2)
BUN SERPL-MCNC: 14 MG/DL (ref 6–20)
BUN/CREAT SERPL: 26.9 (ref 7–25)
CALCIUM SPEC-SCNC: 8.3 MG/DL (ref 8.6–10.5)
CHLORIDE SERPL-SCNC: 108 MMOL/L (ref 98–107)
CO2 SERPL-SCNC: 27 MMOL/L (ref 22–29)
CREAT SERPL-MCNC: 0.52 MG/DL (ref 0.57–1)
CRP SERPL-MCNC: 6.77 MG/DL (ref 0–0.5)
DEPRECATED RDW RBC AUTO: 43.8 FL (ref 37–54)
EOSINOPHIL # BLD MANUAL: 0.13 10*3/MM3 (ref 0–0.4)
EOSINOPHIL NFR BLD MANUAL: 1 % (ref 0.3–6.2)
ERYTHROCYTE [DISTWIDTH] IN BLOOD BY AUTOMATED COUNT: 13.3 % (ref 12.3–15.4)
GFR SERPL CREATININE-BSD FRML MDRD: 128 ML/MIN/1.73
GLOBULIN UR ELPH-MCNC: 3.1 GM/DL
GLUCOSE BLDC GLUCOMTR-MCNC: 114 MG/DL (ref 70–130)
GLUCOSE BLDC GLUCOMTR-MCNC: 124 MG/DL (ref 70–130)
GLUCOSE BLDC GLUCOMTR-MCNC: 138 MG/DL (ref 70–130)
GLUCOSE BLDC GLUCOMTR-MCNC: 198 MG/DL (ref 70–130)
GLUCOSE SERPL-MCNC: 186 MG/DL (ref 65–99)
HCT VFR BLD AUTO: 30.5 % (ref 34–46.6)
HGB BLD-MCNC: 10.1 G/DL (ref 12–15.9)
LYMPHOCYTES # BLD MANUAL: 0.54 10*3/MM3 (ref 0.7–3.1)
LYMPHOCYTES NFR BLD MANUAL: 2 % (ref 5–12)
LYMPHOCYTES NFR BLD MANUAL: 4 % (ref 19.6–45.3)
MCH RBC QN AUTO: 30.2 PG (ref 26.6–33)
MCHC RBC AUTO-ENTMCNC: 33.1 G/DL (ref 31.5–35.7)
MCV RBC AUTO: 91.3 FL (ref 79–97)
MONOCYTES # BLD AUTO: 0.27 10*3/MM3 (ref 0.1–0.9)
MYELOCYTES NFR BLD MANUAL: 1 % (ref 0–0)
NEUTROPHILS # BLD AUTO: 12.38 10*3/MM3 (ref 1.7–7)
NEUTROPHILS NFR BLD MANUAL: 92 % (ref 42.7–76)
PLAT MORPH BLD: NORMAL
PLATELET # BLD AUTO: 240 10*3/MM3 (ref 140–450)
PMV BLD AUTO: 10.5 FL (ref 6–12)
POLYCHROMASIA BLD QL SMEAR: ABNORMAL
POTASSIUM SERPL-SCNC: 4.6 MMOL/L (ref 3.5–5.2)
PROCALCITONIN SERPL-MCNC: 0.24 NG/ML (ref 0–0.25)
PROT SERPL-MCNC: 5.3 G/DL (ref 6–8.5)
RBC # BLD AUTO: 3.34 10*6/MM3 (ref 3.77–5.28)
SODIUM SERPL-SCNC: 140 MMOL/L (ref 136–145)
WBC # BLD AUTO: 13.46 10*3/MM3 (ref 3.4–10.8)
WBC MORPH BLD: NORMAL

## 2021-09-02 PROCEDURE — 85025 COMPLETE CBC W/AUTO DIFF WBC: CPT | Performed by: INTERNAL MEDICINE

## 2021-09-02 PROCEDURE — 94799 UNLISTED PULMONARY SVC/PX: CPT

## 2021-09-02 PROCEDURE — 82962 GLUCOSE BLOOD TEST: CPT

## 2021-09-02 PROCEDURE — 25010000002 FENTANYL CITRATE (PF) 2500 MCG/50ML SOLUTION: Performed by: INTERNAL MEDICINE

## 2021-09-02 PROCEDURE — 25010000002 AZITHROMYCIN PER 500 MG: Performed by: INTERNAL MEDICINE

## 2021-09-02 PROCEDURE — 99233 SBSQ HOSP IP/OBS HIGH 50: CPT | Performed by: INTERNAL MEDICINE

## 2021-09-02 PROCEDURE — 25010000002 PROPOFOL 10 MG/ML EMULSION: Performed by: INTERNAL MEDICINE

## 2021-09-02 PROCEDURE — 80053 COMPREHEN METABOLIC PANEL: CPT | Performed by: INTERNAL MEDICINE

## 2021-09-02 PROCEDURE — 84145 PROCALCITONIN (PCT): CPT | Performed by: INTERNAL MEDICINE

## 2021-09-02 PROCEDURE — 94003 VENT MGMT INPAT SUBQ DAY: CPT

## 2021-09-02 PROCEDURE — 63710000001 INSULIN REGULAR HUMAN PER 5 UNITS: Performed by: INTERNAL MEDICINE

## 2021-09-02 PROCEDURE — 25010000002 DEXAMETHASONE PER 1 MG: Performed by: INTERNAL MEDICINE

## 2021-09-02 PROCEDURE — 86140 C-REACTIVE PROTEIN: CPT | Performed by: INTERNAL MEDICINE

## 2021-09-02 PROCEDURE — 85007 BL SMEAR W/DIFF WBC COUNT: CPT | Performed by: INTERNAL MEDICINE

## 2021-09-02 PROCEDURE — 25010000002 ENOXAPARIN PER 10 MG: Performed by: INTERNAL MEDICINE

## 2021-09-02 RX ORDER — WHEY PROTEIN ISOLATE 6 G-25/7 G
2 POWDER (GRAM) ORAL 3 TIMES DAILY
Status: DISCONTINUED | OUTPATIENT
Start: 2021-09-02 | End: 2021-09-15

## 2021-09-02 RX ADMIN — PROPOFOL 50 MCG/KG/MIN: 10 INJECTION, EMULSION INTRAVENOUS at 08:32

## 2021-09-02 RX ADMIN — MINERAL OIL, PETROLATUM: 425; 568 OINTMENT OPHTHALMIC at 00:19

## 2021-09-02 RX ADMIN — GUAIFENESIN 400 MG: 100 SOLUTION ORAL at 05:32

## 2021-09-02 RX ADMIN — SODIUM CHLORIDE, PRESERVATIVE FREE 10 ML: 5 INJECTION INTRAVENOUS at 08:32

## 2021-09-02 RX ADMIN — FAMOTIDINE 20 MG: 10 INJECTION INTRAVENOUS at 20:01

## 2021-09-02 RX ADMIN — ALBUTEROL SULFATE 6 PUFF: 108 INHALANT RESPIRATORY (INHALATION) at 00:15

## 2021-09-02 RX ADMIN — PROPOFOL 50 MCG/KG/MIN: 10 INJECTION, EMULSION INTRAVENOUS at 05:23

## 2021-09-02 RX ADMIN — Medication 1000 UNITS: at 08:31

## 2021-09-02 RX ADMIN — FENTANYL CITRATE 250 MCG/HR: 50 INJECTION INTRAVENOUS at 08:46

## 2021-09-02 RX ADMIN — MINERAL OIL, PETROLATUM: 425; 568 OINTMENT OPHTHALMIC at 20:00

## 2021-09-02 RX ADMIN — INSULIN HUMAN 2 UNITS: 100 INJECTION, SOLUTION PARENTERAL at 17:26

## 2021-09-02 RX ADMIN — ENOXAPARIN SODIUM 90 MG: 100 INJECTION SUBCUTANEOUS at 05:22

## 2021-09-02 RX ADMIN — Medication 2 PACKET: at 16:52

## 2021-09-02 RX ADMIN — ALBUTEROL SULFATE 6 PUFF: 108 INHALANT RESPIRATORY (INHALATION) at 23:15

## 2021-09-02 RX ADMIN — PROPOFOL 55 MCG/KG/MIN: 10 INJECTION, EMULSION INTRAVENOUS at 00:14

## 2021-09-02 RX ADMIN — FENTANYL CITRATE 200 MCG/HR: 50 INJECTION INTRAVENOUS at 21:20

## 2021-09-02 RX ADMIN — ENOXAPARIN SODIUM 90 MG: 100 INJECTION SUBCUTANEOUS at 17:00

## 2021-09-02 RX ADMIN — DEXAMETHASONE SODIUM PHOSPHATE 6 MG: 10 INJECTION INTRAMUSCULAR; INTRAVENOUS at 08:32

## 2021-09-02 RX ADMIN — FAMOTIDINE 20 MG: 10 INJECTION INTRAVENOUS at 08:31

## 2021-09-02 RX ADMIN — PROPOFOL 45 MCG/KG/MIN: 10 INJECTION, EMULSION INTRAVENOUS at 23:58

## 2021-09-02 RX ADMIN — AZITHROMYCIN MONOHYDRATE 500 MG: 500 INJECTION, POWDER, LYOPHILIZED, FOR SOLUTION INTRAVENOUS at 17:00

## 2021-09-02 RX ADMIN — MINERAL OIL, PETROLATUM: 425; 568 OINTMENT OPHTHALMIC at 15:02

## 2021-09-02 RX ADMIN — ZINC SULFATE 220 MG (50 MG) CAPSULE 220 MG: CAPSULE at 08:31

## 2021-09-02 RX ADMIN — OXYCODONE HYDROCHLORIDE AND ACETAMINOPHEN 500 MG: 500 TABLET ORAL at 08:31

## 2021-09-02 RX ADMIN — GUAIFENESIN 400 MG: 100 SOLUTION ORAL at 11:16

## 2021-09-02 RX ADMIN — PROPOFOL 50 MCG/KG/MIN: 10 INJECTION, EMULSION INTRAVENOUS at 11:21

## 2021-09-02 RX ADMIN — ALBUTEROL SULFATE 6 PUFF: 108 INHALANT RESPIRATORY (INHALATION) at 13:50

## 2021-09-02 RX ADMIN — ALBUTEROL SULFATE 6 PUFF: 108 INHALANT RESPIRATORY (INHALATION) at 18:55

## 2021-09-02 RX ADMIN — PROPOFOL 50 MCG/KG/MIN: 10 INJECTION, EMULSION INTRAVENOUS at 15:02

## 2021-09-02 RX ADMIN — MINERAL OIL, PETROLATUM: 425; 568 OINTMENT OPHTHALMIC at 05:31

## 2021-09-02 RX ADMIN — CHLORHEXIDINE GLUCONATE 0.12% ORAL RINSE 15 ML: 1.2 LIQUID ORAL at 20:01

## 2021-09-02 RX ADMIN — Medication 2 PACKET: at 20:00

## 2021-09-02 RX ADMIN — PROPOFOL 40 MCG/KG/MIN: 10 INJECTION, EMULSION INTRAVENOUS at 18:31

## 2021-09-02 RX ADMIN — ALBUTEROL SULFATE 6 PUFF: 108 INHALANT RESPIRATORY (INHALATION) at 07:19

## 2021-09-02 RX ADMIN — SODIUM CHLORIDE, PRESERVATIVE FREE 10 ML: 5 INJECTION INTRAVENOUS at 20:01

## 2021-09-02 RX ADMIN — GUAIFENESIN 400 MG: 100 SOLUTION ORAL at 00:14

## 2021-09-02 RX ADMIN — GUAIFENESIN 400 MG: 100 SOLUTION ORAL at 17:00

## 2021-09-02 RX ADMIN — MINERAL OIL, PETROLATUM: 425; 568 OINTMENT OPHTHALMIC at 11:16

## 2021-09-02 RX ADMIN — CHLORHEXIDINE GLUCONATE 0.12% ORAL RINSE 15 ML: 1.2 LIQUID ORAL at 08:31

## 2021-09-02 RX ADMIN — MINERAL OIL, PETROLATUM: 425; 568 OINTMENT OPHTHALMIC at 08:32

## 2021-09-03 LAB
GLUCOSE BLDC GLUCOMTR-MCNC: 116 MG/DL (ref 70–130)
GLUCOSE BLDC GLUCOMTR-MCNC: 146 MG/DL (ref 70–130)
GLUCOSE BLDC GLUCOMTR-MCNC: 203 MG/DL (ref 70–130)
GLUCOSE BLDC GLUCOMTR-MCNC: 223 MG/DL (ref 70–130)
GLUCOSE BLDC GLUCOMTR-MCNC: 246 MG/DL (ref 70–130)

## 2021-09-03 PROCEDURE — 94799 UNLISTED PULMONARY SVC/PX: CPT

## 2021-09-03 PROCEDURE — 99233 SBSQ HOSP IP/OBS HIGH 50: CPT | Performed by: INTERNAL MEDICINE

## 2021-09-03 PROCEDURE — 25010000002 FUROSEMIDE PER 20 MG: Performed by: INTERNAL MEDICINE

## 2021-09-03 PROCEDURE — 25010000002 FENTANYL CITRATE (PF) 2500 MCG/50ML SOLUTION: Performed by: INTERNAL MEDICINE

## 2021-09-03 PROCEDURE — 25010000002 DEXAMETHASONE PER 1 MG: Performed by: INTERNAL MEDICINE

## 2021-09-03 PROCEDURE — 25010000002 ENOXAPARIN PER 10 MG: Performed by: INTERNAL MEDICINE

## 2021-09-03 PROCEDURE — 63710000001 INSULIN REGULAR HUMAN PER 5 UNITS: Performed by: INTERNAL MEDICINE

## 2021-09-03 PROCEDURE — 82962 GLUCOSE BLOOD TEST: CPT

## 2021-09-03 PROCEDURE — 25010000002 PROPOFOL 10 MG/ML EMULSION: Performed by: INTERNAL MEDICINE

## 2021-09-03 PROCEDURE — 94003 VENT MGMT INPAT SUBQ DAY: CPT

## 2021-09-03 RX ORDER — FUROSEMIDE 10 MG/ML
20 INJECTION INTRAMUSCULAR; INTRAVENOUS ONCE
Status: COMPLETED | OUTPATIENT
Start: 2021-09-03 | End: 2021-09-03

## 2021-09-03 RX ADMIN — ALBUTEROL SULFATE 6 PUFF: 108 INHALANT RESPIRATORY (INHALATION) at 13:11

## 2021-09-03 RX ADMIN — Medication 2 PACKET: at 20:16

## 2021-09-03 RX ADMIN — Medication 1000 UNITS: at 09:36

## 2021-09-03 RX ADMIN — FAMOTIDINE 20 MG: 10 INJECTION INTRAVENOUS at 09:36

## 2021-09-03 RX ADMIN — FENTANYL CITRATE 250 MCG/HR: 50 INJECTION INTRAVENOUS at 10:13

## 2021-09-03 RX ADMIN — SODIUM CHLORIDE, PRESERVATIVE FREE 10 ML: 5 INJECTION INTRAVENOUS at 09:37

## 2021-09-03 RX ADMIN — FAMOTIDINE 20 MG: 10 INJECTION INTRAVENOUS at 20:16

## 2021-09-03 RX ADMIN — SODIUM CHLORIDE, PRESERVATIVE FREE 10 ML: 5 INJECTION INTRAVENOUS at 20:17

## 2021-09-03 RX ADMIN — ENOXAPARIN SODIUM 90 MG: 100 INJECTION SUBCUTANEOUS at 18:01

## 2021-09-03 RX ADMIN — ENOXAPARIN SODIUM 90 MG: 100 INJECTION SUBCUTANEOUS at 06:17

## 2021-09-03 RX ADMIN — INSULIN HUMAN 4 UNITS: 100 INJECTION, SOLUTION PARENTERAL at 18:06

## 2021-09-03 RX ADMIN — ALBUTEROL SULFATE 6 PUFF: 108 INHALANT RESPIRATORY (INHALATION) at 07:11

## 2021-09-03 RX ADMIN — MINERAL OIL, PETROLATUM: 425; 568 OINTMENT OPHTHALMIC at 00:15

## 2021-09-03 RX ADMIN — ALBUTEROL SULFATE 6 PUFF: 108 INHALANT RESPIRATORY (INHALATION) at 23:25

## 2021-09-03 RX ADMIN — GUAIFENESIN 400 MG: 100 SOLUTION ORAL at 17:05

## 2021-09-03 RX ADMIN — SODIUM CHLORIDE, PRESERVATIVE FREE 10 ML: 5 INJECTION INTRAVENOUS at 21:28

## 2021-09-03 RX ADMIN — PROPOFOL 50 MCG/KG/MIN: 10 INJECTION, EMULSION INTRAVENOUS at 16:41

## 2021-09-03 RX ADMIN — Medication 2 PACKET: at 16:41

## 2021-09-03 RX ADMIN — GUAIFENESIN 400 MG: 100 SOLUTION ORAL at 23:13

## 2021-09-03 RX ADMIN — INSULIN HUMAN 4 UNITS: 100 INJECTION, SOLUTION PARENTERAL at 13:17

## 2021-09-03 RX ADMIN — OXYCODONE HYDROCHLORIDE AND ACETAMINOPHEN 500 MG: 500 TABLET ORAL at 09:36

## 2021-09-03 RX ADMIN — GUAIFENESIN 400 MG: 100 SOLUTION ORAL at 06:17

## 2021-09-03 RX ADMIN — CHLORHEXIDINE GLUCONATE 0.12% ORAL RINSE 15 ML: 1.2 LIQUID ORAL at 09:37

## 2021-09-03 RX ADMIN — PROPOFOL 50 MCG/KG/MIN: 10 INJECTION, EMULSION INTRAVENOUS at 19:54

## 2021-09-03 RX ADMIN — DEXAMETHASONE SODIUM PHOSPHATE 6 MG: 10 INJECTION INTRAMUSCULAR; INTRAVENOUS at 09:37

## 2021-09-03 RX ADMIN — PROPOFOL 50 MCG/KG/MIN: 10 INJECTION, EMULSION INTRAVENOUS at 12:58

## 2021-09-03 RX ADMIN — MINERAL OIL, PETROLATUM: 425; 568 OINTMENT OPHTHALMIC at 03:54

## 2021-09-03 RX ADMIN — CHLORHEXIDINE GLUCONATE 0.12% ORAL RINSE 15 ML: 1.2 LIQUID ORAL at 20:16

## 2021-09-03 RX ADMIN — PROPOFOL 45 MCG/KG/MIN: 10 INJECTION, EMULSION INTRAVENOUS at 06:17

## 2021-09-03 RX ADMIN — ZINC SULFATE 220 MG (50 MG) CAPSULE 220 MG: CAPSULE at 09:37

## 2021-09-03 RX ADMIN — PROPOFOL 45 MCG/KG/MIN: 10 INJECTION, EMULSION INTRAVENOUS at 09:31

## 2021-09-03 RX ADMIN — FUROSEMIDE 20 MG: 10 INJECTION, SOLUTION INTRAVENOUS at 21:28

## 2021-09-03 RX ADMIN — PROPOFOL 50 MCG/KG/MIN: 10 INJECTION, EMULSION INTRAVENOUS at 23:22

## 2021-09-03 RX ADMIN — GUAIFENESIN 400 MG: 100 SOLUTION ORAL at 13:17

## 2021-09-03 RX ADMIN — FENTANYL CITRATE 250 MCG/HR: 50 INJECTION INTRAVENOUS at 18:18

## 2021-09-03 RX ADMIN — GUAIFENESIN 400 MG: 100 SOLUTION ORAL at 00:15

## 2021-09-03 RX ADMIN — PROPOFOL 45 MCG/KG/MIN: 10 INJECTION, EMULSION INTRAVENOUS at 01:51

## 2021-09-03 RX ADMIN — INSULIN HUMAN 4 UNITS: 100 INJECTION, SOLUTION PARENTERAL at 23:16

## 2021-09-03 RX ADMIN — Medication 2 PACKET: at 09:38

## 2021-09-03 RX ADMIN — ALBUTEROL SULFATE 6 PUFF: 108 INHALANT RESPIRATORY (INHALATION) at 19:10

## 2021-09-04 ENCOUNTER — APPOINTMENT (OUTPATIENT)
Dept: CT IMAGING | Facility: HOSPITAL | Age: 46
End: 2021-09-04

## 2021-09-04 ENCOUNTER — APPOINTMENT (OUTPATIENT)
Dept: GENERAL RADIOLOGY | Facility: HOSPITAL | Age: 46
End: 2021-09-04

## 2021-09-04 LAB
ABO GROUP BLD: NORMAL
ALBUMIN SERPL-MCNC: 2.5 G/DL (ref 3.5–5.2)
ALBUMIN/GLOB SERPL: 0.9 G/DL
ALP SERPL-CCNC: 44 U/L (ref 39–117)
ALT SERPL W P-5'-P-CCNC: 44 U/L (ref 1–33)
ANION GAP SERPL CALCULATED.3IONS-SCNC: 8 MMOL/L (ref 5–15)
AST SERPL-CCNC: 33 U/L (ref 1–32)
BACTERIA UR QL AUTO: ABNORMAL /HPF
BILIRUB SERPL-MCNC: 0.4 MG/DL (ref 0–1.2)
BILIRUB UR QL STRIP: NEGATIVE
BLD GP AB SCN SERPL QL: NEGATIVE
BUN SERPL-MCNC: 41 MG/DL (ref 6–20)
BUN/CREAT SERPL: 32.5 (ref 7–25)
CALCIUM SPEC-SCNC: 8.6 MG/DL (ref 8.6–10.5)
CHLORIDE SERPL-SCNC: 102 MMOL/L (ref 98–107)
CLARITY UR: ABNORMAL
CO2 SERPL-SCNC: 25 MMOL/L (ref 22–29)
COLOR UR: ABNORMAL
CREAT SERPL-MCNC: 1.26 MG/DL (ref 0.57–1)
DEPRECATED RDW RBC AUTO: 43.9 FL (ref 37–54)
EOSINOPHIL # BLD MANUAL: 0.51 10*3/MM3 (ref 0–0.4)
EOSINOPHIL NFR BLD MANUAL: 2 % (ref 0.3–6.2)
ERYTHROCYTE [DISTWIDTH] IN BLOOD BY AUTOMATED COUNT: 13.8 % (ref 12.3–15.4)
GFR SERPL CREATININE-BSD FRML MDRD: 46 ML/MIN/1.73
GLOBULIN UR ELPH-MCNC: 2.9 GM/DL
GLUCOSE BLDC GLUCOMTR-MCNC: 233 MG/DL (ref 70–130)
GLUCOSE BLDC GLUCOMTR-MCNC: 235 MG/DL (ref 70–130)
GLUCOSE SERPL-MCNC: 166 MG/DL (ref 65–99)
GLUCOSE UR STRIP-MCNC: NEGATIVE MG/DL
HCT VFR BLD AUTO: 18.1 % (ref 34–46.6)
HCT VFR BLD AUTO: 20.9 % (ref 34–46.6)
HCT VFR BLD AUTO: 23.3 % (ref 34–46.6)
HGB BLD-MCNC: 5.9 G/DL (ref 12–15.9)
HGB BLD-MCNC: 6.7 G/DL (ref 12–15.9)
HGB BLD-MCNC: 7.8 G/DL (ref 12–15.9)
HGB UR QL STRIP.AUTO: ABNORMAL
HYALINE CASTS UR QL AUTO: ABNORMAL /LPF
INR PPP: 1.12 (ref 0.91–1.09)
KETONES UR QL STRIP: NEGATIVE
LEUKOCYTE ESTERASE UR QL STRIP.AUTO: ABNORMAL
LYMPHOCYTES # BLD MANUAL: 1.02 10*3/MM3 (ref 0.7–3.1)
LYMPHOCYTES NFR BLD MANUAL: 4 % (ref 19.6–45.3)
LYMPHOCYTES NFR BLD MANUAL: 6.1 % (ref 5–12)
MCH RBC QN AUTO: 29.6 PG (ref 26.6–33)
MCHC RBC AUTO-ENTMCNC: 32.1 G/DL (ref 31.5–35.7)
MCV RBC AUTO: 92.5 FL (ref 79–97)
MONOCYTES # BLD AUTO: 1.55 10*3/MM3 (ref 0.1–0.9)
NEUTROPHILS # BLD AUTO: 22.35 10*3/MM3 (ref 1.7–7)
NEUTROPHILS NFR BLD MANUAL: 85.9 % (ref 42.7–76)
NEUTS BAND NFR BLD MANUAL: 2 % (ref 0–5)
NITRITE UR QL STRIP: NEGATIVE
PH UR STRIP.AUTO: <=5 [PH] (ref 5–8)
PLAT MORPH BLD: NORMAL
PLATELET # BLD AUTO: 300 10*3/MM3 (ref 140–450)
PMV BLD AUTO: 11.5 FL (ref 6–12)
POLYCHROMASIA BLD QL SMEAR: ABNORMAL
POTASSIUM SERPL-SCNC: 4.7 MMOL/L (ref 3.5–5.2)
PROT SERPL-MCNC: 5.4 G/DL (ref 6–8.5)
PROT UR QL STRIP: ABNORMAL
PROTHROMBIN TIME: 13.5 SECONDS (ref 11.5–13.4)
RBC # BLD AUTO: 2.26 10*6/MM3 (ref 3.77–5.28)
RBC # UR: ABNORMAL /HPF
REF LAB TEST METHOD: ABNORMAL
RH BLD: POSITIVE
SODIUM SERPL-SCNC: 135 MMOL/L (ref 136–145)
SP GR UR STRIP: 1.02 (ref 1–1.03)
SQUAMOUS #/AREA URNS HPF: ABNORMAL /HPF
T&S EXPIRATION DATE: NORMAL
UROBILINOGEN UR QL STRIP: ABNORMAL
WBC # BLD AUTO: 25.43 10*3/MM3 (ref 3.4–10.8)
WBC MORPH BLD: NORMAL
WBC UR QL AUTO: ABNORMAL /HPF
YEAST URNS QL MICRO: ABNORMAL /HPF

## 2021-09-04 PROCEDURE — 85025 COMPLETE CBC W/AUTO DIFF WBC: CPT | Performed by: INTERNAL MEDICINE

## 2021-09-04 PROCEDURE — 99222 1ST HOSP IP/OBS MODERATE 55: CPT | Performed by: UROLOGY

## 2021-09-04 PROCEDURE — 94799 UNLISTED PULMONARY SVC/PX: CPT

## 2021-09-04 PROCEDURE — 86900 BLOOD TYPING SEROLOGIC ABO: CPT

## 2021-09-04 PROCEDURE — 80053 COMPREHEN METABOLIC PANEL: CPT | Performed by: INTERNAL MEDICINE

## 2021-09-04 PROCEDURE — 86920 COMPATIBILITY TEST SPIN: CPT

## 2021-09-04 PROCEDURE — 85018 HEMOGLOBIN: CPT | Performed by: INTERNAL MEDICINE

## 2021-09-04 PROCEDURE — 25010000002 PROPOFOL 10 MG/ML EMULSION: Performed by: INTERNAL MEDICINE

## 2021-09-04 PROCEDURE — P9016 RBC LEUKOCYTES REDUCED: HCPCS

## 2021-09-04 PROCEDURE — 82962 GLUCOSE BLOOD TEST: CPT

## 2021-09-04 PROCEDURE — 85007 BL SMEAR W/DIFF WBC COUNT: CPT | Performed by: INTERNAL MEDICINE

## 2021-09-04 PROCEDURE — 25010000002 FENTANYL CITRATE (PF) 2500 MCG/50ML SOLUTION: Performed by: INTERNAL MEDICINE

## 2021-09-04 PROCEDURE — 85610 PROTHROMBIN TIME: CPT | Performed by: SPECIALIST

## 2021-09-04 PROCEDURE — 86927 PLASMA FRESH FROZEN: CPT

## 2021-09-04 PROCEDURE — 25010000002 DEXAMETHASONE PER 1 MG: Performed by: INTERNAL MEDICINE

## 2021-09-04 PROCEDURE — 85014 HEMATOCRIT: CPT | Performed by: INTERNAL MEDICINE

## 2021-09-04 PROCEDURE — 86850 RBC ANTIBODY SCREEN: CPT | Performed by: INTERNAL MEDICINE

## 2021-09-04 PROCEDURE — P9017 PLASMA 1 DONOR FRZ W/IN 8 HR: HCPCS

## 2021-09-04 PROCEDURE — 81001 URINALYSIS AUTO W/SCOPE: CPT | Performed by: INTERNAL MEDICINE

## 2021-09-04 PROCEDURE — 25010000002 FUROSEMIDE PER 20 MG: Performed by: INTERNAL MEDICINE

## 2021-09-04 PROCEDURE — 86900 BLOOD TYPING SEROLOGIC ABO: CPT | Performed by: INTERNAL MEDICINE

## 2021-09-04 PROCEDURE — 86901 BLOOD TYPING SEROLOGIC RH(D): CPT

## 2021-09-04 PROCEDURE — 74176 CT ABD & PELVIS W/O CONTRAST: CPT

## 2021-09-04 PROCEDURE — 86901 BLOOD TYPING SEROLOGIC RH(D): CPT | Performed by: INTERNAL MEDICINE

## 2021-09-04 PROCEDURE — 94003 VENT MGMT INPAT SUBQ DAY: CPT

## 2021-09-04 PROCEDURE — 63710000001 INSULIN REGULAR HUMAN PER 5 UNITS: Performed by: INTERNAL MEDICINE

## 2021-09-04 PROCEDURE — 36430 TRANSFUSION BLD/BLD COMPNT: CPT

## 2021-09-04 PROCEDURE — 71045 X-RAY EXAM CHEST 1 VIEW: CPT

## 2021-09-04 PROCEDURE — 25010000002 VITAMIN K1 PER 1 MG: Performed by: SPECIALIST

## 2021-09-04 RX ORDER — FUROSEMIDE 10 MG/ML
40 INJECTION INTRAMUSCULAR; INTRAVENOUS EVERY 12 HOURS
Status: DISCONTINUED | OUTPATIENT
Start: 2021-09-04 | End: 2021-09-05

## 2021-09-04 RX ORDER — AMOXICILLIN 250 MG
1 CAPSULE ORAL 2 TIMES DAILY
Status: DISCONTINUED | OUTPATIENT
Start: 2021-09-04 | End: 2021-09-24 | Stop reason: HOSPADM

## 2021-09-04 RX ADMIN — INSULIN HUMAN 4 UNITS: 100 INJECTION, SOLUTION PARENTERAL at 12:49

## 2021-09-04 RX ADMIN — PROPOFOL 50 MCG/KG/MIN: 10 INJECTION, EMULSION INTRAVENOUS at 10:01

## 2021-09-04 RX ADMIN — FENTANYL CITRATE 300 MCG/HR: 50 INJECTION INTRAVENOUS at 04:36

## 2021-09-04 RX ADMIN — GUAIFENESIN 400 MG: 100 SOLUTION ORAL at 06:36

## 2021-09-04 RX ADMIN — OXYCODONE HYDROCHLORIDE AND ACETAMINOPHEN 500 MG: 500 TABLET ORAL at 09:47

## 2021-09-04 RX ADMIN — PHYTONADIONE 10 MG: 10 INJECTION, EMULSION INTRAMUSCULAR; INTRAVENOUS; SUBCUTANEOUS at 19:48

## 2021-09-04 RX ADMIN — ALBUTEROL SULFATE 6 PUFF: 108 INHALANT RESPIRATORY (INHALATION) at 23:41

## 2021-09-04 RX ADMIN — GUAIFENESIN 400 MG: 100 SOLUTION ORAL at 12:49

## 2021-09-04 RX ADMIN — DEXAMETHASONE SODIUM PHOSPHATE 6 MG: 10 INJECTION INTRAMUSCULAR; INTRAVENOUS at 09:46

## 2021-09-04 RX ADMIN — SODIUM CHLORIDE, PRESERVATIVE FREE 10 ML: 5 INJECTION INTRAVENOUS at 20:31

## 2021-09-04 RX ADMIN — ALBUTEROL SULFATE 6 PUFF: 108 INHALANT RESPIRATORY (INHALATION) at 14:34

## 2021-09-04 RX ADMIN — ALBUTEROL SULFATE 6 PUFF: 108 INHALANT RESPIRATORY (INHALATION) at 06:45

## 2021-09-04 RX ADMIN — SODIUM CHLORIDE, PRESERVATIVE FREE 10 ML: 5 INJECTION INTRAVENOUS at 20:50

## 2021-09-04 RX ADMIN — FUROSEMIDE 40 MG: 10 INJECTION INTRAMUSCULAR; INTRAVENOUS at 09:46

## 2021-09-04 RX ADMIN — CHLORHEXIDINE GLUCONATE 0.12% ORAL RINSE 15 ML: 1.2 LIQUID ORAL at 20:32

## 2021-09-04 RX ADMIN — CHLORHEXIDINE GLUCONATE 0.12% ORAL RINSE 15 ML: 1.2 LIQUID ORAL at 09:47

## 2021-09-04 RX ADMIN — ALBUTEROL SULFATE 6 PUFF: 108 INHALANT RESPIRATORY (INHALATION) at 18:49

## 2021-09-04 RX ADMIN — SODIUM CHLORIDE, PRESERVATIVE FREE 10 ML: 5 INJECTION INTRAVENOUS at 09:47

## 2021-09-04 RX ADMIN — FAMOTIDINE 20 MG: 10 INJECTION INTRAVENOUS at 20:50

## 2021-09-04 RX ADMIN — INSULIN HUMAN 2 UNITS: 100 INJECTION, SOLUTION PARENTERAL at 06:33

## 2021-09-04 RX ADMIN — INSULIN HUMAN 4 UNITS: 100 INJECTION, SOLUTION PARENTERAL at 18:38

## 2021-09-04 RX ADMIN — Medication 2 PACKET: at 09:47

## 2021-09-04 RX ADMIN — FENTANYL CITRATE 250 MCG/HR: 50 INJECTION INTRAVENOUS at 14:21

## 2021-09-04 RX ADMIN — PROPOFOL 50 MCG/KG/MIN: 10 INJECTION, EMULSION INTRAVENOUS at 20:25

## 2021-09-04 RX ADMIN — ZINC SULFATE 220 MG (50 MG) CAPSULE 220 MG: CAPSULE at 09:47

## 2021-09-04 RX ADMIN — PROPOFOL 50 MCG/KG/MIN: 10 INJECTION, EMULSION INTRAVENOUS at 14:07

## 2021-09-04 RX ADMIN — FUROSEMIDE 40 MG: 10 INJECTION INTRAMUSCULAR; INTRAVENOUS at 20:50

## 2021-09-04 RX ADMIN — PROPOFOL 50 MCG/KG/MIN: 10 INJECTION, EMULSION INTRAVENOUS at 06:34

## 2021-09-04 RX ADMIN — PROPOFOL 50 MCG/KG/MIN: 10 INJECTION, EMULSION INTRAVENOUS at 03:10

## 2021-09-04 RX ADMIN — FAMOTIDINE 20 MG: 10 INJECTION INTRAVENOUS at 09:46

## 2021-09-04 RX ADMIN — PROPOFOL 50 MCG/KG/MIN: 10 INJECTION, EMULSION INTRAVENOUS at 17:09

## 2021-09-04 RX ADMIN — Medication 1000 UNITS: at 09:47

## 2021-09-05 ENCOUNTER — APPOINTMENT (OUTPATIENT)
Dept: CT IMAGING | Facility: HOSPITAL | Age: 46
End: 2021-09-05

## 2021-09-05 LAB
25(OH)D3 SERPL-MCNC: 46.4 NG/ML (ref 30–100)
ALBUMIN SERPL-MCNC: 3 G/DL (ref 3.5–5.2)
ALBUMIN/GLOB SERPL: 0.9 G/DL
ALP SERPL-CCNC: 52 U/L (ref 39–117)
ALT SERPL W P-5'-P-CCNC: 43 U/L (ref 1–33)
ANION GAP SERPL CALCULATED.3IONS-SCNC: 11 MMOL/L (ref 5–15)
ANION GAP SERPL CALCULATED.3IONS-SCNC: 8 MMOL/L (ref 5–15)
AST SERPL-CCNC: 38 U/L (ref 1–32)
BILIRUB SERPL-MCNC: 0.5 MG/DL (ref 0–1.2)
BUN SERPL-MCNC: 61 MG/DL (ref 6–20)
BUN SERPL-MCNC: 64 MG/DL (ref 6–20)
BUN/CREAT SERPL: 26.2 (ref 7–25)
BUN/CREAT SERPL: 27.6 (ref 7–25)
CALCIUM SPEC-SCNC: 8.7 MG/DL (ref 8.6–10.5)
CALCIUM SPEC-SCNC: 8.8 MG/DL (ref 8.6–10.5)
CHLORIDE SERPL-SCNC: 99 MMOL/L (ref 98–107)
CHLORIDE SERPL-SCNC: 99 MMOL/L (ref 98–107)
CO2 SERPL-SCNC: 22 MMOL/L (ref 22–29)
CO2 SERPL-SCNC: 24 MMOL/L (ref 22–29)
CREAT SERPL-MCNC: 2.21 MG/DL (ref 0.57–1)
CREAT SERPL-MCNC: 2.44 MG/DL (ref 0.57–1)
CREAT UR-MCNC: 65.8 MG/DL
DEPRECATED RDW RBC AUTO: 44.9 FL (ref 37–54)
EOSINOPHIL SPEC QL MICRO: 0 % EOS/100 CELLS (ref 0–0)
ERYTHROCYTE [DISTWIDTH] IN BLOOD BY AUTOMATED COUNT: 14 % (ref 12.3–15.4)
GFR SERPL CREATININE-BSD FRML MDRD: 22 ML/MIN/1.73
GFR SERPL CREATININE-BSD FRML MDRD: 24 ML/MIN/1.73
GLOBULIN UR ELPH-MCNC: 3.3 GM/DL
GLUCOSE BLDC GLUCOMTR-MCNC: 154 MG/DL (ref 70–130)
GLUCOSE BLDC GLUCOMTR-MCNC: 158 MG/DL (ref 70–130)
GLUCOSE BLDC GLUCOMTR-MCNC: 181 MG/DL (ref 70–130)
GLUCOSE SERPL-MCNC: 120 MG/DL (ref 65–99)
GLUCOSE SERPL-MCNC: 150 MG/DL (ref 65–99)
HCT VFR BLD AUTO: 21.1 % (ref 34–46.6)
HCT VFR BLD AUTO: 21.7 % (ref 34–46.6)
HCT VFR BLD AUTO: 21.8 % (ref 34–46.6)
HCT VFR BLD AUTO: 22.7 % (ref 34–46.6)
HCT VFR BLD AUTO: 23.3 % (ref 34–46.6)
HGB BLD-MCNC: 7 G/DL (ref 12–15.9)
HGB BLD-MCNC: 7.1 G/DL (ref 12–15.9)
HGB BLD-MCNC: 7.5 G/DL (ref 12–15.9)
HGB BLD-MCNC: 7.7 G/DL (ref 12–15.9)
HGB BLD-MCNC: 8 G/DL (ref 12–15.9)
INR PPP: 1.08 (ref 0.91–1.09)
MAGNESIUM SERPL-MCNC: 2.7 MG/DL (ref 1.6–2.6)
MCH RBC QN AUTO: 30 PG (ref 26.6–33)
MCHC RBC AUTO-ENTMCNC: 33 G/DL (ref 31.5–35.7)
MCV RBC AUTO: 90.7 FL (ref 79–97)
OSMOLALITY UR: 295 MOSM/KG (ref 50–1400)
PHOSPHATE SERPL-MCNC: 5.4 MG/DL (ref 2.5–4.5)
PLATELET # BLD AUTO: 259 10*3/MM3 (ref 140–450)
PMV BLD AUTO: 10.7 FL (ref 6–12)
POTASSIUM SERPL-SCNC: 4.8 MMOL/L (ref 3.5–5.2)
POTASSIUM SERPL-SCNC: 4.9 MMOL/L (ref 3.5–5.2)
PROT SERPL-MCNC: 6.3 G/DL (ref 6–8.5)
PROT UR-MCNC: 157.4 MG/DL
PROTHROMBIN TIME: 13.2 SECONDS (ref 11.5–13.4)
RBC # BLD AUTO: 2.57 10*6/MM3 (ref 3.77–5.28)
SODIUM SERPL-SCNC: 129 MMOL/L (ref 136–145)
SODIUM SERPL-SCNC: 134 MMOL/L (ref 136–145)
SODIUM UR-SCNC: <20 MMOL/L
WBC # BLD AUTO: 20.13 10*3/MM3 (ref 3.4–10.8)

## 2021-09-05 PROCEDURE — 85014 HEMATOCRIT: CPT | Performed by: INTERNAL MEDICINE

## 2021-09-05 PROCEDURE — 83735 ASSAY OF MAGNESIUM: CPT | Performed by: CLINICAL NURSE SPECIALIST

## 2021-09-05 PROCEDURE — 94799 UNLISTED PULMONARY SVC/PX: CPT

## 2021-09-05 PROCEDURE — 80053 COMPREHEN METABOLIC PANEL: CPT | Performed by: INTERNAL MEDICINE

## 2021-09-05 PROCEDURE — 74176 CT ABD & PELVIS W/O CONTRAST: CPT

## 2021-09-05 PROCEDURE — 85018 HEMOGLOBIN: CPT | Performed by: SPECIALIST

## 2021-09-05 PROCEDURE — 85027 COMPLETE CBC AUTOMATED: CPT | Performed by: INTERNAL MEDICINE

## 2021-09-05 PROCEDURE — 94003 VENT MGMT INPAT SUBQ DAY: CPT

## 2021-09-05 PROCEDURE — 83935 ASSAY OF URINE OSMOLALITY: CPT | Performed by: CLINICAL NURSE SPECIALIST

## 2021-09-05 PROCEDURE — 25010000002 PROPOFOL 10 MG/ML EMULSION: Performed by: INTERNAL MEDICINE

## 2021-09-05 PROCEDURE — 63710000001 INSULIN REGULAR HUMAN PER 5 UNITS: Performed by: INTERNAL MEDICINE

## 2021-09-05 PROCEDURE — 85018 HEMOGLOBIN: CPT | Performed by: INTERNAL MEDICINE

## 2021-09-05 PROCEDURE — 82306 VITAMIN D 25 HYDROXY: CPT | Performed by: CLINICAL NURSE SPECIALIST

## 2021-09-05 PROCEDURE — 25010000002 DEXAMETHASONE PER 1 MG: Performed by: INTERNAL MEDICINE

## 2021-09-05 PROCEDURE — 82570 ASSAY OF URINE CREATININE: CPT | Performed by: CLINICAL NURSE SPECIALIST

## 2021-09-05 PROCEDURE — 85014 HEMATOCRIT: CPT | Performed by: SPECIALIST

## 2021-09-05 PROCEDURE — 84100 ASSAY OF PHOSPHORUS: CPT | Performed by: CLINICAL NURSE SPECIALIST

## 2021-09-05 PROCEDURE — 84300 ASSAY OF URINE SODIUM: CPT | Performed by: CLINICAL NURSE SPECIALIST

## 2021-09-05 PROCEDURE — 25010000002 FUROSEMIDE PER 20 MG: Performed by: INTERNAL MEDICINE

## 2021-09-05 PROCEDURE — 82962 GLUCOSE BLOOD TEST: CPT

## 2021-09-05 PROCEDURE — 84156 ASSAY OF PROTEIN URINE: CPT | Performed by: CLINICAL NURSE SPECIALIST

## 2021-09-05 PROCEDURE — 87205 SMEAR GRAM STAIN: CPT | Performed by: CLINICAL NURSE SPECIALIST

## 2021-09-05 PROCEDURE — 80048 BASIC METABOLIC PNL TOTAL CA: CPT | Performed by: INTERNAL MEDICINE

## 2021-09-05 PROCEDURE — 25010000002 FENTANYL CITRATE (PF) 2500 MCG/50ML SOLUTION: Performed by: INTERNAL MEDICINE

## 2021-09-05 PROCEDURE — 85610 PROTHROMBIN TIME: CPT | Performed by: SPECIALIST

## 2021-09-05 PROCEDURE — 99232 SBSQ HOSP IP/OBS MODERATE 35: CPT | Performed by: UROLOGY

## 2021-09-05 RX ORDER — NOREPINEPHRINE BIT/0.9 % NACL 8 MG/250ML
.02-.3 INFUSION BOTTLE (ML) INTRAVENOUS
Status: DISCONTINUED | OUTPATIENT
Start: 2021-09-05 | End: 2021-09-08

## 2021-09-05 RX ORDER — LABETALOL HYDROCHLORIDE 5 MG/ML
20 INJECTION, SOLUTION INTRAVENOUS EVERY 6 HOURS PRN
Status: DISCONTINUED | OUTPATIENT
Start: 2021-09-05 | End: 2021-09-24 | Stop reason: HOSPADM

## 2021-09-05 RX ORDER — DEXAMETHASONE SODIUM PHOSPHATE 4 MG/ML
4 INJECTION, SOLUTION INTRA-ARTICULAR; INTRALESIONAL; INTRAMUSCULAR; INTRAVENOUS; SOFT TISSUE DAILY
Status: COMPLETED | OUTPATIENT
Start: 2021-09-05 | End: 2021-09-05

## 2021-09-05 RX ORDER — FAMOTIDINE 10 MG/ML
20 INJECTION, SOLUTION INTRAVENOUS DAILY
Status: DISCONTINUED | OUTPATIENT
Start: 2021-09-06 | End: 2021-09-11

## 2021-09-05 RX ADMIN — DOCUSATE SODIUM 50 MG AND SENNOSIDES 8.6 MG 1 TABLET: 8.6; 5 TABLET, FILM COATED ORAL at 20:08

## 2021-09-05 RX ADMIN — PROPOFOL 40 MCG/KG/MIN: 10 INJECTION, EMULSION INTRAVENOUS at 23:28

## 2021-09-05 RX ADMIN — PROPOFOL 50 MCG/KG/MIN: 10 INJECTION, EMULSION INTRAVENOUS at 00:26

## 2021-09-05 RX ADMIN — ALBUTEROL SULFATE 6 PUFF: 108 INHALANT RESPIRATORY (INHALATION) at 14:44

## 2021-09-05 RX ADMIN — PROPOFOL 50 MCG/KG/MIN: 10 INJECTION, EMULSION INTRAVENOUS at 08:41

## 2021-09-05 RX ADMIN — PROPOFOL 50 MCG/KG/MIN: 10 INJECTION, EMULSION INTRAVENOUS at 04:04

## 2021-09-05 RX ADMIN — FAMOTIDINE 20 MG: 10 INJECTION INTRAVENOUS at 08:53

## 2021-09-05 RX ADMIN — DEXAMETHASONE SODIUM PHOSPHATE 4 MG: 4 INJECTION, SOLUTION INTRA-ARTICULAR; INTRALESIONAL; INTRAMUSCULAR; INTRAVENOUS; SOFT TISSUE at 11:35

## 2021-09-05 RX ADMIN — SODIUM CHLORIDE, PRESERVATIVE FREE 10 ML: 5 INJECTION INTRAVENOUS at 08:54

## 2021-09-05 RX ADMIN — GUAIFENESIN 400 MG: 100 SOLUTION ORAL at 23:30

## 2021-09-05 RX ADMIN — PROPOFOL 50 MCG/KG/MIN: 10 INJECTION, EMULSION INTRAVENOUS at 11:34

## 2021-09-05 RX ADMIN — GUAIFENESIN 400 MG: 100 SOLUTION ORAL at 11:35

## 2021-09-05 RX ADMIN — INSULIN HUMAN 2 UNITS: 100 INJECTION, SOLUTION PARENTERAL at 00:29

## 2021-09-05 RX ADMIN — OXYCODONE HYDROCHLORIDE AND ACETAMINOPHEN 500 MG: 500 TABLET ORAL at 08:53

## 2021-09-05 RX ADMIN — PROPOFOL 50 MCG/KG/MIN: 10 INJECTION, EMULSION INTRAVENOUS at 15:38

## 2021-09-05 RX ADMIN — ZINC SULFATE 220 MG (50 MG) CAPSULE 220 MG: CAPSULE at 08:53

## 2021-09-05 RX ADMIN — FENTANYL CITRATE 200 MCG/HR: 50 INJECTION INTRAVENOUS at 23:29

## 2021-09-05 RX ADMIN — CHLORHEXIDINE GLUCONATE 0.12% ORAL RINSE 15 ML: 1.2 LIQUID ORAL at 20:08

## 2021-09-05 RX ADMIN — ALBUTEROL SULFATE 6 PUFF: 108 INHALANT RESPIRATORY (INHALATION) at 06:44

## 2021-09-05 RX ADMIN — Medication 2 PACKET: at 20:08

## 2021-09-05 RX ADMIN — ALBUTEROL SULFATE 6 PUFF: 108 INHALANT RESPIRATORY (INHALATION) at 23:17

## 2021-09-05 RX ADMIN — GUAIFENESIN 400 MG: 100 SOLUTION ORAL at 17:50

## 2021-09-05 RX ADMIN — FENTANYL CITRATE 250 MCG/HR: 50 INJECTION INTRAVENOUS at 11:33

## 2021-09-05 RX ADMIN — SODIUM CHLORIDE, PRESERVATIVE FREE 10 ML: 5 INJECTION INTRAVENOUS at 23:28

## 2021-09-05 RX ADMIN — Medication 1000 UNITS: at 08:53

## 2021-09-05 RX ADMIN — INSULIN HUMAN 2 UNITS: 100 INJECTION, SOLUTION PARENTERAL at 11:34

## 2021-09-05 RX ADMIN — CHLORHEXIDINE GLUCONATE 0.12% ORAL RINSE 15 ML: 1.2 LIQUID ORAL at 08:53

## 2021-09-05 RX ADMIN — INSULIN HUMAN 2 UNITS: 100 INJECTION, SOLUTION PARENTERAL at 17:52

## 2021-09-05 RX ADMIN — FUROSEMIDE 10 MG/HR: 10 INJECTION, SOLUTION INTRAMUSCULAR; INTRAVENOUS at 17:50

## 2021-09-05 RX ADMIN — ALBUTEROL SULFATE 6 PUFF: 108 INHALANT RESPIRATORY (INHALATION) at 19:14

## 2021-09-05 RX ADMIN — FENTANYL CITRATE 250 MCG/HR: 50 INJECTION INTRAVENOUS at 00:27

## 2021-09-05 RX ADMIN — DOCUSATE SODIUM 50 MG AND SENNOSIDES 8.6 MG 1 TABLET: 8.6; 5 TABLET, FILM COATED ORAL at 08:53

## 2021-09-05 RX ADMIN — PROPOFOL 40 MCG/KG/MIN: 10 INJECTION, EMULSION INTRAVENOUS at 20:15

## 2021-09-06 PROBLEM — N94.89 PELVIC HEMATOMA, FEMALE: Status: ACTIVE | Noted: 2021-09-06

## 2021-09-06 PROBLEM — E87.5 HYPERKALEMIA: Status: ACTIVE | Noted: 2021-09-06

## 2021-09-06 PROBLEM — D62 ACUTE BLOOD LOSS ANEMIA: Status: ACTIVE | Noted: 2021-09-06

## 2021-09-06 LAB
ALBUMIN SERPL-MCNC: 3.1 G/DL (ref 3.5–5.2)
ALBUMIN/GLOB SERPL: 1 G/DL
ALP SERPL-CCNC: 56 U/L (ref 39–117)
ALT SERPL W P-5'-P-CCNC: 47 U/L (ref 1–33)
ANION GAP SERPL CALCULATED.3IONS-SCNC: 15 MMOL/L (ref 5–15)
ANION GAP SERPL CALCULATED.3IONS-SCNC: 16 MMOL/L (ref 5–15)
ANION GAP SERPL CALCULATED.3IONS-SCNC: 16 MMOL/L (ref 5–15)
ANION GAP SERPL CALCULATED.3IONS-SCNC: 17 MMOL/L (ref 5–15)
ANISOCYTOSIS BLD QL: ABNORMAL
ARTERIAL PATENCY WRIST A: ABNORMAL
AST SERPL-CCNC: 33 U/L (ref 1–32)
ATMOSPHERIC PRESS: 750 MMHG
BASE EXCESS BLDA CALC-SCNC: -3.8 MMOL/L (ref 0–2)
BDY SITE: ABNORMAL
BH BB BLOOD EXPIRATION DATE: NORMAL
BH BB BLOOD TYPE BARCODE: 6200
BH BB DISPENSE STATUS: NORMAL
BH BB PRODUCT CODE: NORMAL
BH BB UNIT NUMBER: NORMAL
BILIRUB SERPL-MCNC: 0.5 MG/DL (ref 0–1.2)
BODY TEMPERATURE: 37 C
BUN SERPL-MCNC: 74 MG/DL (ref 6–20)
BUN SERPL-MCNC: 77 MG/DL (ref 6–20)
BUN SERPL-MCNC: 80 MG/DL (ref 6–20)
BUN SERPL-MCNC: 83 MG/DL (ref 6–20)
BUN/CREAT SERPL: 22.1 (ref 7–25)
BUN/CREAT SERPL: 22.2 (ref 7–25)
BUN/CREAT SERPL: 22.7 (ref 7–25)
BUN/CREAT SERPL: 23.5 (ref 7–25)
CALCIUM SPEC-SCNC: 8 MG/DL (ref 8.6–10.5)
CALCIUM SPEC-SCNC: 8.1 MG/DL (ref 8.6–10.5)
CALCIUM SPEC-SCNC: 8.3 MG/DL (ref 8.6–10.5)
CALCIUM SPEC-SCNC: 8.3 MG/DL (ref 8.6–10.5)
CHLORIDE SERPL-SCNC: 95 MMOL/L (ref 98–107)
CHLORIDE SERPL-SCNC: 95 MMOL/L (ref 98–107)
CHLORIDE SERPL-SCNC: 96 MMOL/L (ref 98–107)
CHLORIDE SERPL-SCNC: 97 MMOL/L (ref 98–107)
CK SERPL-CCNC: 345 U/L (ref 20–180)
CLUMPED PLATELETS: PRESENT
CO2 SERPL-SCNC: 19 MMOL/L (ref 22–29)
CO2 SERPL-SCNC: 19 MMOL/L (ref 22–29)
CO2 SERPL-SCNC: 20 MMOL/L (ref 22–29)
CO2 SERPL-SCNC: 20 MMOL/L (ref 22–29)
CREAT SERPL-MCNC: 3.15 MG/DL (ref 0.57–1)
CREAT SERPL-MCNC: 3.49 MG/DL (ref 0.57–1)
CREAT SERPL-MCNC: 3.6 MG/DL (ref 0.57–1)
CREAT SERPL-MCNC: 3.65 MG/DL (ref 0.57–1)
CROSSMATCH INTERPRETATION: NORMAL
CROSSMATCH INTERPRETATION: NORMAL
CRP SERPL-MCNC: 12.15 MG/DL (ref 0–0.5)
DEPRECATED RDW RBC AUTO: 47 FL (ref 37–54)
ERYTHROCYTE [DISTWIDTH] IN BLOOD BY AUTOMATED COUNT: 14.6 % (ref 12.3–15.4)
GFR SERPL CREATININE-BSD FRML MDRD: 14 ML/MIN/1.73
GFR SERPL CREATININE-BSD FRML MDRD: 16 ML/MIN/1.73
GFR SERPL CREATININE-BSD FRML MDRD: ABNORMAL ML/MIN/{1.73_M2}
GLOBULIN UR ELPH-MCNC: 3.2 GM/DL
GLUCOSE BLDC GLUCOMTR-MCNC: 122 MG/DL (ref 70–130)
GLUCOSE BLDC GLUCOMTR-MCNC: 134 MG/DL (ref 70–130)
GLUCOSE BLDC GLUCOMTR-MCNC: 138 MG/DL (ref 70–130)
GLUCOSE BLDC GLUCOMTR-MCNC: 149 MG/DL (ref 70–130)
GLUCOSE BLDC GLUCOMTR-MCNC: 154 MG/DL (ref 70–130)
GLUCOSE SERPL-MCNC: 125 MG/DL (ref 65–99)
GLUCOSE SERPL-MCNC: 129 MG/DL (ref 65–99)
GLUCOSE SERPL-MCNC: 133 MG/DL (ref 65–99)
GLUCOSE SERPL-MCNC: 149 MG/DL (ref 65–99)
HCO3 BLDA-SCNC: 22.2 MMOL/L (ref 20–26)
HCT VFR BLD AUTO: 19.5 % (ref 34–46.6)
HCT VFR BLD AUTO: 23.6 % (ref 34–46.6)
HCT VFR BLD AUTO: 25 % (ref 34–46.6)
HGB BLD-MCNC: 6.7 G/DL (ref 12–15.9)
HGB BLD-MCNC: 7.8 G/DL (ref 12–15.9)
HGB BLD-MCNC: 8.4 G/DL (ref 12–15.9)
INHALED O2 CONCENTRATION: 40 %
LYMPHOCYTES # BLD MANUAL: 1.23 10*3/MM3 (ref 0.7–3.1)
LYMPHOCYTES NFR BLD MANUAL: 6.1 % (ref 19.6–45.3)
LYMPHOCYTES NFR BLD MANUAL: 8.2 % (ref 5–12)
Lab: ABNORMAL
MCH RBC QN AUTO: 31.5 PG (ref 26.6–33)
MCHC RBC AUTO-ENTMCNC: 34.4 G/DL (ref 31.5–35.7)
MCV RBC AUTO: 91.5 FL (ref 79–97)
METAMYELOCYTES NFR BLD MANUAL: 1 % (ref 0–0)
MODALITY: ABNORMAL
MONOCYTES # BLD AUTO: 1.65 10*3/MM3 (ref 0.1–0.9)
NEUTROPHILS # BLD AUTO: 17.07 10*3/MM3 (ref 1.7–7)
NEUTROPHILS NFR BLD MANUAL: 83.7 % (ref 42.7–76)
NEUTS BAND NFR BLD MANUAL: 1 % (ref 0–5)
NRBC SPEC MANUAL: 1 /100 WBC (ref 0–0.2)
PCO2 BLDA: 44.1 MM HG (ref 35–45)
PCO2 TEMP ADJ BLD: 44.1 MM HG (ref 35–45)
PEEP RESPIRATORY: 7.5 CM[H2O]
PH BLDA: 7.31 PH UNITS (ref 7.35–7.45)
PH, TEMP CORRECTED: 7.31 PH UNITS (ref 7.35–7.45)
PLATELET # BLD AUTO: 261 10*3/MM3 (ref 140–450)
PMV BLD AUTO: 10.7 FL (ref 6–12)
PO2 BLDA: 158 MM HG (ref 83–108)
PO2 TEMP ADJ BLD: 158 MM HG (ref 83–108)
POLYCHROMASIA BLD QL SMEAR: ABNORMAL
POTASSIUM SERPL-SCNC: 5.1 MMOL/L (ref 3.5–5.2)
POTASSIUM SERPL-SCNC: 5.2 MMOL/L (ref 3.5–5.2)
POTASSIUM SERPL-SCNC: 5.5 MMOL/L (ref 3.5–5.2)
POTASSIUM SERPL-SCNC: 5.9 MMOL/L (ref 3.5–5.2)
PROT SERPL-MCNC: 6.3 G/DL (ref 6–8.5)
RBC # BLD AUTO: 2.13 10*6/MM3 (ref 3.77–5.28)
SAO2 % BLDCOA: 99.7 % (ref 94–99)
SET MECH RESP RATE: 20
SODIUM SERPL-SCNC: 131 MMOL/L (ref 136–145)
SODIUM SERPL-SCNC: 132 MMOL/L (ref 136–145)
UNIT  ABO: NORMAL
UNIT  RH: NORMAL
URATE SERPL-MCNC: 5.8 MG/DL (ref 2.4–5.7)
VENTILATOR MODE: AC
VT ON VENT VENT: 400 ML
WBC # BLD AUTO: 20.15 10*3/MM3 (ref 3.4–10.8)
WBC MORPH BLD: NORMAL

## 2021-09-06 PROCEDURE — 99232 SBSQ HOSP IP/OBS MODERATE 35: CPT | Performed by: UROLOGY

## 2021-09-06 PROCEDURE — 86140 C-REACTIVE PROTEIN: CPT | Performed by: INTERNAL MEDICINE

## 2021-09-06 PROCEDURE — 85025 COMPLETE CBC W/AUTO DIFF WBC: CPT | Performed by: INTERNAL MEDICINE

## 2021-09-06 PROCEDURE — 25010000002 FENTANYL 10 MCG/1 ML NS: Performed by: INTERNAL MEDICINE

## 2021-09-06 PROCEDURE — 82962 GLUCOSE BLOOD TEST: CPT

## 2021-09-06 PROCEDURE — 85007 BL SMEAR W/DIFF WBC COUNT: CPT | Performed by: INTERNAL MEDICINE

## 2021-09-06 PROCEDURE — 25010000002 PROPOFOL 10 MG/ML EMULSION: Performed by: INTERNAL MEDICINE

## 2021-09-06 PROCEDURE — 86900 BLOOD TYPING SEROLOGIC ABO: CPT

## 2021-09-06 PROCEDURE — 82550 ASSAY OF CK (CPK): CPT | Performed by: INTERNAL MEDICINE

## 2021-09-06 PROCEDURE — 63710000001 INSULIN REGULAR HUMAN PER 5 UNITS: Performed by: INTERNAL MEDICINE

## 2021-09-06 PROCEDURE — 36430 TRANSFUSION BLD/BLD COMPNT: CPT

## 2021-09-06 PROCEDURE — 84550 ASSAY OF BLOOD/URIC ACID: CPT | Performed by: INTERNAL MEDICINE

## 2021-09-06 PROCEDURE — P9016 RBC LEUKOCYTES REDUCED: HCPCS

## 2021-09-06 PROCEDURE — 94799 UNLISTED PULMONARY SVC/PX: CPT

## 2021-09-06 PROCEDURE — 85014 HEMATOCRIT: CPT | Performed by: INTERNAL MEDICINE

## 2021-09-06 PROCEDURE — 82803 BLOOD GASES ANY COMBINATION: CPT

## 2021-09-06 PROCEDURE — 80048 BASIC METABOLIC PNL TOTAL CA: CPT | Performed by: INTERNAL MEDICINE

## 2021-09-06 PROCEDURE — 85014 HEMATOCRIT: CPT | Performed by: SPECIALIST

## 2021-09-06 PROCEDURE — 94003 VENT MGMT INPAT SUBQ DAY: CPT

## 2021-09-06 PROCEDURE — 25010000002 FUROSEMIDE PER 20 MG: Performed by: INTERNAL MEDICINE

## 2021-09-06 PROCEDURE — 80053 COMPREHEN METABOLIC PANEL: CPT | Performed by: INTERNAL MEDICINE

## 2021-09-06 PROCEDURE — 25010000002 FENTANYL CITRATE (PF) 2500 MCG/50ML SOLUTION: Performed by: INTERNAL MEDICINE

## 2021-09-06 PROCEDURE — 85018 HEMOGLOBIN: CPT | Performed by: SPECIALIST

## 2021-09-06 PROCEDURE — 85018 HEMOGLOBIN: CPT | Performed by: INTERNAL MEDICINE

## 2021-09-06 RX ORDER — SODIUM CHLORIDE, SODIUM LACTATE, POTASSIUM CHLORIDE, CALCIUM CHLORIDE 600; 310; 30; 20 MG/100ML; MG/100ML; MG/100ML; MG/100ML
20 INJECTION, SOLUTION INTRAVENOUS CONTINUOUS
Status: DISCONTINUED | OUTPATIENT
Start: 2021-09-06 | End: 2021-09-12

## 2021-09-06 RX ORDER — SODIUM POLYSTYRENE SULFONATE 15 G/60ML
30 SUSPENSION ORAL; RECTAL ONCE
Status: COMPLETED | OUTPATIENT
Start: 2021-09-06 | End: 2021-09-06

## 2021-09-06 RX ORDER — SODIUM POLYSTYRENE SULFONATE 15 G/60ML
15 SUSPENSION ORAL; RECTAL ONCE
Status: COMPLETED | OUTPATIENT
Start: 2021-09-06 | End: 2021-09-06

## 2021-09-06 RX ORDER — SODIUM CHLORIDE, SODIUM LACTATE, POTASSIUM CHLORIDE, CALCIUM CHLORIDE 600; 310; 30; 20 MG/100ML; MG/100ML; MG/100ML; MG/100ML
150 INJECTION, SOLUTION INTRAVENOUS CONTINUOUS
Status: DISCONTINUED | OUTPATIENT
Start: 2021-09-06 | End: 2021-09-06

## 2021-09-06 RX ADMIN — INSULIN HUMAN 2 UNITS: 100 INJECTION, SOLUTION PARENTERAL at 17:54

## 2021-09-06 RX ADMIN — SODIUM CHLORIDE, PRESERVATIVE FREE 10 ML: 5 INJECTION INTRAVENOUS at 21:23

## 2021-09-06 RX ADMIN — GUAIFENESIN 400 MG: 100 SOLUTION ORAL at 17:01

## 2021-09-06 RX ADMIN — Medication 1000 UNITS: at 09:35

## 2021-09-06 RX ADMIN — OXYCODONE HYDROCHLORIDE AND ACETAMINOPHEN 500 MG: 500 TABLET ORAL at 09:35

## 2021-09-06 RX ADMIN — SODIUM POLYSTYRENE SULFONATE 30 G: 15 SUSPENSION ORAL; RECTAL at 06:40

## 2021-09-06 RX ADMIN — ALBUTEROL SULFATE 6 PUFF: 108 INHALANT RESPIRATORY (INHALATION) at 23:02

## 2021-09-06 RX ADMIN — GUAIFENESIN 400 MG: 100 SOLUTION ORAL at 13:02

## 2021-09-06 RX ADMIN — PROPOFOL 40 MCG/KG/MIN: 10 INJECTION, EMULSION INTRAVENOUS at 21:25

## 2021-09-06 RX ADMIN — Medication 2 PACKET: at 17:01

## 2021-09-06 RX ADMIN — PROPOFOL 40 MCG/KG/MIN: 10 INJECTION, EMULSION INTRAVENOUS at 03:26

## 2021-09-06 RX ADMIN — DOCUSATE SODIUM 50 MG AND SENNOSIDES 8.6 MG 1 TABLET: 8.6; 5 TABLET, FILM COATED ORAL at 09:35

## 2021-09-06 RX ADMIN — PROPOFOL 40 MCG/KG/MIN: 10 INJECTION, EMULSION INTRAVENOUS at 17:48

## 2021-09-06 RX ADMIN — GUAIFENESIN 400 MG: 100 SOLUTION ORAL at 05:07

## 2021-09-06 RX ADMIN — DOCUSATE SODIUM 50 MG AND SENNOSIDES 8.6 MG 1 TABLET: 8.6; 5 TABLET, FILM COATED ORAL at 21:22

## 2021-09-06 RX ADMIN — PROPOFOL 40 MCG/KG/MIN: 10 INJECTION, EMULSION INTRAVENOUS at 13:39

## 2021-09-06 RX ADMIN — CHLORHEXIDINE GLUCONATE 0.12% ORAL RINSE 15 ML: 1.2 LIQUID ORAL at 09:35

## 2021-09-06 RX ADMIN — ALBUTEROL SULFATE 6 PUFF: 108 INHALANT RESPIRATORY (INHALATION) at 14:38

## 2021-09-06 RX ADMIN — ZINC SULFATE 220 MG (50 MG) CAPSULE 220 MG: CAPSULE at 09:35

## 2021-09-06 RX ADMIN — ALBUTEROL SULFATE 6 PUFF: 108 INHALANT RESPIRATORY (INHALATION) at 20:02

## 2021-09-06 RX ADMIN — SODIUM CHLORIDE, POTASSIUM CHLORIDE, SODIUM LACTATE AND CALCIUM CHLORIDE 1000 ML: 600; 310; 30; 20 INJECTION, SOLUTION INTRAVENOUS at 10:26

## 2021-09-06 RX ADMIN — FENTANYL CITRATE 200 MCG/HR: 50 INJECTION INTRAVENOUS at 22:17

## 2021-09-06 RX ADMIN — FAMOTIDINE 20 MG: 10 INJECTION INTRAVENOUS at 09:35

## 2021-09-06 RX ADMIN — CHLORHEXIDINE GLUCONATE 0.12% ORAL RINSE 15 ML: 1.2 LIQUID ORAL at 21:22

## 2021-09-06 RX ADMIN — SODIUM CHLORIDE, POTASSIUM CHLORIDE, SODIUM LACTATE AND CALCIUM CHLORIDE 150 ML/HR: 600; 310; 30; 20 INJECTION, SOLUTION INTRAVENOUS at 13:02

## 2021-09-06 RX ADMIN — SODIUM CHLORIDE, POTASSIUM CHLORIDE, SODIUM LACTATE AND CALCIUM CHLORIDE 150 ML/HR: 600; 310; 30; 20 INJECTION, SOLUTION INTRAVENOUS at 19:46

## 2021-09-06 RX ADMIN — FENTANYL CITRATE 200 MCG/HR: 50 INJECTION INTRAVENOUS at 13:05

## 2021-09-06 RX ADMIN — SODIUM CHLORIDE, PRESERVATIVE FREE 10 ML: 5 INJECTION INTRAVENOUS at 09:34

## 2021-09-06 RX ADMIN — ALBUTEROL SULFATE 6 PUFF: 108 INHALANT RESPIRATORY (INHALATION) at 07:01

## 2021-09-06 RX ADMIN — GUAIFENESIN 400 MG: 100 SOLUTION ORAL at 23:40

## 2021-09-06 RX ADMIN — Medication 2 PACKET: at 09:34

## 2021-09-06 RX ADMIN — FUROSEMIDE 10 MG/HR: 10 INJECTION, SOLUTION INTRAMUSCULAR; INTRAVENOUS at 01:57

## 2021-09-06 RX ADMIN — SODIUM POLYSTYRENE SULFONATE 15 G: 15 SUSPENSION ORAL; RECTAL at 13:02

## 2021-09-06 RX ADMIN — PROPOFOL 40 MCG/KG/MIN: 10 INJECTION, EMULSION INTRAVENOUS at 10:26

## 2021-09-06 RX ADMIN — PROPOFOL 40 MCG/KG/MIN: 10 INJECTION, EMULSION INTRAVENOUS at 06:40

## 2021-09-07 LAB
ANION GAP SERPL CALCULATED.3IONS-SCNC: 17 MMOL/L (ref 5–15)
ANION GAP SERPL CALCULATED.3IONS-SCNC: 19 MMOL/L (ref 5–15)
ANION GAP SERPL CALCULATED.3IONS-SCNC: 20 MMOL/L (ref 5–15)
BUN SERPL-MCNC: 84 MG/DL (ref 6–20)
BUN SERPL-MCNC: 85 MG/DL (ref 6–20)
BUN SERPL-MCNC: 89 MG/DL (ref 6–20)
BUN/CREAT SERPL: 20.2 (ref 7–25)
BUN/CREAT SERPL: 21 (ref 7–25)
BUN/CREAT SERPL: 21.1 (ref 7–25)
CALCIUM SPEC-SCNC: 7.9 MG/DL (ref 8.6–10.5)
CALCIUM SPEC-SCNC: 8 MG/DL (ref 8.6–10.5)
CALCIUM SPEC-SCNC: 8.1 MG/DL (ref 8.6–10.5)
CHLORIDE SERPL-SCNC: 93 MMOL/L (ref 98–107)
CHLORIDE SERPL-SCNC: 96 MMOL/L (ref 98–107)
CHLORIDE SERPL-SCNC: 96 MMOL/L (ref 98–107)
CO2 SERPL-SCNC: 17 MMOL/L (ref 22–29)
CREAT SERPL-MCNC: 4 MG/DL (ref 0.57–1)
CREAT SERPL-MCNC: 4.03 MG/DL (ref 0.57–1)
CREAT SERPL-MCNC: 4.41 MG/DL (ref 0.57–1)
DEPRECATED RDW RBC AUTO: 48.5 FL (ref 37–54)
DEPRECATED RDW RBC AUTO: 48.6 FL (ref 37–54)
EOSINOPHIL # BLD MANUAL: 0.37 10*3/MM3 (ref 0–0.4)
EOSINOPHIL NFR BLD MANUAL: 2 % (ref 0.3–6.2)
ERYTHROCYTE [DISTWIDTH] IN BLOOD BY AUTOMATED COUNT: 14.9 % (ref 12.3–15.4)
ERYTHROCYTE [DISTWIDTH] IN BLOOD BY AUTOMATED COUNT: 15.4 % (ref 12.3–15.4)
GFR SERPL CREATININE-BSD FRML MDRD: 11 ML/MIN/1.73
GFR SERPL CREATININE-BSD FRML MDRD: 12 ML/MIN/1.73
GFR SERPL CREATININE-BSD FRML MDRD: 12 ML/MIN/1.73
GFR SERPL CREATININE-BSD FRML MDRD: ABNORMAL ML/MIN/{1.73_M2}
GLUCOSE BLDC GLUCOMTR-MCNC: 110 MG/DL (ref 70–130)
GLUCOSE BLDC GLUCOMTR-MCNC: 137 MG/DL (ref 70–130)
GLUCOSE BLDC GLUCOMTR-MCNC: 151 MG/DL (ref 70–130)
GLUCOSE BLDC GLUCOMTR-MCNC: 161 MG/DL (ref 70–130)
GLUCOSE SERPL-MCNC: 127 MG/DL (ref 65–99)
GLUCOSE SERPL-MCNC: 145 MG/DL (ref 65–99)
GLUCOSE SERPL-MCNC: 166 MG/DL (ref 65–99)
HBV SURFACE AB SER RIA-ACNC: NORMAL
HBV SURFACE AG SERPL QL IA: NORMAL
HCT VFR BLD AUTO: 20.3 % (ref 34–46.6)
HCT VFR BLD AUTO: 20.8 % (ref 34–46.6)
HCT VFR BLD AUTO: 22 % (ref 34–46.6)
HCT VFR BLD AUTO: 22.2 % (ref 34–46.6)
HCT VFR BLD AUTO: 23.2 % (ref 34–46.6)
HGB BLD-MCNC: 7 G/DL (ref 12–15.9)
HGB BLD-MCNC: 7 G/DL (ref 12–15.9)
HGB BLD-MCNC: 7.6 G/DL (ref 12–15.9)
INR PPP: 1.23 (ref 0.91–1.09)
LYMPHOCYTES # BLD MANUAL: 1.86 10*3/MM3 (ref 0.7–3.1)
LYMPHOCYTES NFR BLD MANUAL: 10 % (ref 19.6–45.3)
LYMPHOCYTES NFR BLD MANUAL: 7 % (ref 5–12)
MCH RBC QN AUTO: 31 PG (ref 26.6–33)
MCH RBC QN AUTO: 31.9 PG (ref 26.6–33)
MCHC RBC AUTO-ENTMCNC: 33.7 G/DL (ref 31.5–35.7)
MCHC RBC AUTO-ENTMCNC: 34.2 G/DL (ref 31.5–35.7)
MCV RBC AUTO: 92 FL (ref 79–97)
MCV RBC AUTO: 93.3 FL (ref 79–97)
METAMYELOCYTES NFR BLD MANUAL: 1 % (ref 0–0)
MONOCYTES # BLD AUTO: 1.3 10*3/MM3 (ref 0.1–0.9)
NEUTROPHILS # BLD AUTO: 14.86 10*3/MM3 (ref 1.7–7)
NEUTROPHILS NFR BLD MANUAL: 80 % (ref 42.7–76)
NRBC BLD AUTO-RTO: 0.3 /100 WBC (ref 0–0.2)
NRBC SPEC MANUAL: 2 /100 WBC (ref 0–0.2)
PLAT MORPH BLD: NORMAL
PLATELET # BLD AUTO: 254 10*3/MM3 (ref 140–450)
PLATELET # BLD AUTO: 302 10*3/MM3 (ref 140–450)
PMV BLD AUTO: 10.2 FL (ref 6–12)
PMV BLD AUTO: 10.3 FL (ref 6–12)
POIKILOCYTOSIS BLD QL SMEAR: ABNORMAL
POLYCHROMASIA BLD QL SMEAR: ABNORMAL
POTASSIUM SERPL-SCNC: 4.5 MMOL/L (ref 3.5–5.2)
POTASSIUM SERPL-SCNC: 4.9 MMOL/L (ref 3.5–5.2)
POTASSIUM SERPL-SCNC: 5.1 MMOL/L (ref 3.5–5.2)
PROTHROMBIN TIME: 14.6 SECONDS (ref 11.5–13.4)
RBC # BLD AUTO: 2.26 10*6/MM3 (ref 3.77–5.28)
RBC # BLD AUTO: 2.38 10*6/MM3 (ref 3.77–5.28)
SODIUM SERPL-SCNC: 130 MMOL/L (ref 136–145)
SODIUM SERPL-SCNC: 130 MMOL/L (ref 136–145)
SODIUM SERPL-SCNC: 132 MMOL/L (ref 136–145)
WBC # BLD AUTO: 17.2 10*3/MM3 (ref 3.4–10.8)
WBC # BLD AUTO: 18.58 10*3/MM3 (ref 3.4–10.8)
WBC MORPH BLD: NORMAL

## 2021-09-07 PROCEDURE — 86706 HEP B SURFACE ANTIBODY: CPT | Performed by: NURSE PRACTITIONER

## 2021-09-07 PROCEDURE — 85018 HEMOGLOBIN: CPT | Performed by: INTERNAL MEDICINE

## 2021-09-07 PROCEDURE — 85610 PROTHROMBIN TIME: CPT | Performed by: INTERNAL MEDICINE

## 2021-09-07 PROCEDURE — 87340 HEPATITIS B SURFACE AG IA: CPT | Performed by: NURSE PRACTITIONER

## 2021-09-07 PROCEDURE — 85014 HEMATOCRIT: CPT | Performed by: INTERNAL MEDICINE

## 2021-09-07 PROCEDURE — 82962 GLUCOSE BLOOD TEST: CPT

## 2021-09-07 PROCEDURE — 80048 BASIC METABOLIC PNL TOTAL CA: CPT | Performed by: INTERNAL MEDICINE

## 2021-09-07 PROCEDURE — 94799 UNLISTED PULMONARY SVC/PX: CPT

## 2021-09-07 PROCEDURE — 85025 COMPLETE CBC W/AUTO DIFF WBC: CPT | Performed by: INTERNAL MEDICINE

## 2021-09-07 PROCEDURE — 85007 BL SMEAR W/DIFF WBC COUNT: CPT | Performed by: INTERNAL MEDICINE

## 2021-09-07 PROCEDURE — 25010000002 FENTANYL CITRATE (PF) 2500 MCG/50ML SOLUTION: Performed by: INTERNAL MEDICINE

## 2021-09-07 PROCEDURE — 99221 1ST HOSP IP/OBS SF/LOW 40: CPT | Performed by: SURGERY

## 2021-09-07 PROCEDURE — 99233 SBSQ HOSP IP/OBS HIGH 50: CPT | Performed by: INTERNAL MEDICINE

## 2021-09-07 PROCEDURE — 94003 VENT MGMT INPAT SUBQ DAY: CPT

## 2021-09-07 PROCEDURE — 25010000002 PROPOFOL 10 MG/ML EMULSION: Performed by: INTERNAL MEDICINE

## 2021-09-07 PROCEDURE — 63710000001 INSULIN REGULAR HUMAN PER 5 UNITS: Performed by: INTERNAL MEDICINE

## 2021-09-07 RX ADMIN — INSULIN HUMAN 2 UNITS: 100 INJECTION, SOLUTION PARENTERAL at 11:24

## 2021-09-07 RX ADMIN — CHLORHEXIDINE GLUCONATE 0.12% ORAL RINSE 15 ML: 1.2 LIQUID ORAL at 20:10

## 2021-09-07 RX ADMIN — GUAIFENESIN 400 MG: 100 SOLUTION ORAL at 11:15

## 2021-09-07 RX ADMIN — ALBUTEROL SULFATE 6 PUFF: 108 INHALANT RESPIRATORY (INHALATION) at 23:02

## 2021-09-07 RX ADMIN — Medication 2 PACKET: at 15:50

## 2021-09-07 RX ADMIN — DOCUSATE SODIUM 50 MG AND SENNOSIDES 8.6 MG 1 TABLET: 8.6; 5 TABLET, FILM COATED ORAL at 08:21

## 2021-09-07 RX ADMIN — SODIUM CHLORIDE, PRESERVATIVE FREE 10 ML: 5 INJECTION INTRAVENOUS at 20:11

## 2021-09-07 RX ADMIN — ALBUTEROL SULFATE 6 PUFF: 108 INHALANT RESPIRATORY (INHALATION) at 13:52

## 2021-09-07 RX ADMIN — Medication 2 PACKET: at 12:23

## 2021-09-07 RX ADMIN — SODIUM CHLORIDE, POTASSIUM CHLORIDE, SODIUM LACTATE AND CALCIUM CHLORIDE 20 ML/HR: 600; 310; 30; 20 INJECTION, SOLUTION INTRAVENOUS at 23:25

## 2021-09-07 RX ADMIN — Medication 1000 UNITS: at 08:21

## 2021-09-07 RX ADMIN — LABETALOL HYDROCHLORIDE 20 MG: 5 INJECTION, SOLUTION INTRAVENOUS at 23:27

## 2021-09-07 RX ADMIN — OXYCODONE HYDROCHLORIDE AND ACETAMINOPHEN 500 MG: 500 TABLET ORAL at 08:21

## 2021-09-07 RX ADMIN — ALBUTEROL SULFATE 6 PUFF: 108 INHALANT RESPIRATORY (INHALATION) at 19:08

## 2021-09-07 RX ADMIN — PROPOFOL 50 MCG/KG/MIN: 10 INJECTION, EMULSION INTRAVENOUS at 08:32

## 2021-09-07 RX ADMIN — GUAIFENESIN 400 MG: 100 SOLUTION ORAL at 17:29

## 2021-09-07 RX ADMIN — LABETALOL HYDROCHLORIDE 20 MG: 5 INJECTION, SOLUTION INTRAVENOUS at 09:21

## 2021-09-07 RX ADMIN — FAMOTIDINE 20 MG: 10 INJECTION INTRAVENOUS at 08:21

## 2021-09-07 RX ADMIN — CHLORHEXIDINE GLUCONATE 0.12% ORAL RINSE 15 ML: 1.2 LIQUID ORAL at 08:21

## 2021-09-07 RX ADMIN — ZINC SULFATE 220 MG (50 MG) CAPSULE 220 MG: CAPSULE at 08:21

## 2021-09-07 RX ADMIN — SODIUM CHLORIDE, PRESERVATIVE FREE 10 ML: 5 INJECTION INTRAVENOUS at 08:21

## 2021-09-07 RX ADMIN — FENTANYL CITRATE 100 MCG/HR: 50 INJECTION INTRAVENOUS at 16:16

## 2021-09-07 RX ADMIN — GUAIFENESIN 400 MG: 100 SOLUTION ORAL at 23:24

## 2021-09-07 RX ADMIN — ALBUTEROL SULFATE 6 PUFF: 108 INHALANT RESPIRATORY (INHALATION) at 07:21

## 2021-09-07 RX ADMIN — DOCUSATE SODIUM 50 MG AND SENNOSIDES 8.6 MG 1 TABLET: 8.6; 5 TABLET, FILM COATED ORAL at 20:10

## 2021-09-07 RX ADMIN — SODIUM CHLORIDE, POTASSIUM CHLORIDE, SODIUM LACTATE AND CALCIUM CHLORIDE 150 ML/HR: 600; 310; 30; 20 INJECTION, SOLUTION INTRAVENOUS at 03:22

## 2021-09-07 RX ADMIN — PROPOFOL 30 MCG/KG/MIN: 10 INJECTION, EMULSION INTRAVENOUS at 21:22

## 2021-09-07 RX ADMIN — Medication 2 PACKET: at 20:11

## 2021-09-07 RX ADMIN — PROPOFOL 45 MCG/KG/MIN: 10 INJECTION, EMULSION INTRAVENOUS at 05:30

## 2021-09-07 RX ADMIN — GUAIFENESIN 400 MG: 100 SOLUTION ORAL at 05:30

## 2021-09-07 RX ADMIN — PROPOFOL 35 MCG/KG/MIN: 10 INJECTION, EMULSION INTRAVENOUS at 12:24

## 2021-09-07 RX ADMIN — PROPOFOL 30 MCG/KG/MIN: 10 INJECTION, EMULSION INTRAVENOUS at 17:30

## 2021-09-07 RX ADMIN — PROPOFOL 45 MCG/KG/MIN: 10 INJECTION, EMULSION INTRAVENOUS at 03:22

## 2021-09-07 RX ADMIN — INSULIN HUMAN 2 UNITS: 100 INJECTION, SOLUTION PARENTERAL at 05:30

## 2021-09-07 RX ADMIN — PROPOFOL 40 MCG/KG/MIN: 10 INJECTION, EMULSION INTRAVENOUS at 00:49

## 2021-09-08 ENCOUNTER — APPOINTMENT (OUTPATIENT)
Dept: GENERAL RADIOLOGY | Facility: HOSPITAL | Age: 46
End: 2021-09-08

## 2021-09-08 LAB
ABO GROUP BLD: NORMAL
ANION GAP SERPL CALCULATED.3IONS-SCNC: 18 MMOL/L (ref 5–15)
ANION GAP SERPL CALCULATED.3IONS-SCNC: 19 MMOL/L (ref 5–15)
ANION GAP SERPL CALCULATED.3IONS-SCNC: 20 MMOL/L (ref 5–15)
ANISOCYTOSIS BLD QL: ABNORMAL
ARTERIAL PATENCY WRIST A: ABNORMAL
ATMOSPHERIC PRESS: 746 MMHG
BASE EXCESS BLDA CALC-SCNC: -8.6 MMOL/L (ref 0–2)
BASO STIPL COARSE BLD QL SMEAR: ABNORMAL
BDY SITE: ABNORMAL
BH BB BLOOD EXPIRATION DATE: NORMAL
BH BB BLOOD TYPE BARCODE: 6200
BH BB DISPENSE STATUS: NORMAL
BH BB PRODUCT CODE: NORMAL
BH BB UNIT NUMBER: NORMAL
BLD GP AB SCN SERPL QL: NEGATIVE
BODY TEMPERATURE: 37 C
BUN SERPL-MCNC: 90 MG/DL (ref 6–20)
BUN SERPL-MCNC: 93 MG/DL (ref 6–20)
BUN SERPL-MCNC: 93 MG/DL (ref 6–20)
BUN/CREAT SERPL: 19.6 (ref 7–25)
BUN/CREAT SERPL: 19.7 (ref 7–25)
BUN/CREAT SERPL: 19.9 (ref 7–25)
CALCIUM SPEC-SCNC: 7.8 MG/DL (ref 8.6–10.5)
CALCIUM SPEC-SCNC: 7.9 MG/DL (ref 8.6–10.5)
CALCIUM SPEC-SCNC: 8.1 MG/DL (ref 8.6–10.5)
CHLORIDE SERPL-SCNC: 93 MMOL/L (ref 98–107)
CHLORIDE SERPL-SCNC: 95 MMOL/L (ref 98–107)
CHLORIDE SERPL-SCNC: 96 MMOL/L (ref 98–107)
CO2 SERPL-SCNC: 16 MMOL/L (ref 22–29)
CREAT SERPL-MCNC: 4.52 MG/DL (ref 0.57–1)
CREAT SERPL-MCNC: 4.72 MG/DL (ref 0.57–1)
CREAT SERPL-MCNC: 4.75 MG/DL (ref 0.57–1)
CROSSMATCH INTERPRETATION: NORMAL
EOSINOPHIL # BLD MANUAL: 0.52 10*3/MM3 (ref 0–0.4)
EOSINOPHIL NFR BLD MANUAL: 3 % (ref 0.3–6.2)
GFR SERPL CREATININE-BSD FRML MDRD: 10 ML/MIN/1.73
GFR SERPL CREATININE-BSD FRML MDRD: 10 ML/MIN/1.73
GFR SERPL CREATININE-BSD FRML MDRD: 11 ML/MIN/1.73
GFR SERPL CREATININE-BSD FRML MDRD: ABNORMAL ML/MIN/{1.73_M2}
GLUCOSE BLDC GLUCOMTR-MCNC: 110 MG/DL (ref 70–130)
GLUCOSE BLDC GLUCOMTR-MCNC: 123 MG/DL (ref 70–130)
GLUCOSE BLDC GLUCOMTR-MCNC: 126 MG/DL (ref 70–130)
GLUCOSE SERPL-MCNC: 104 MG/DL (ref 65–99)
GLUCOSE SERPL-MCNC: 97 MG/DL (ref 65–99)
GLUCOSE SERPL-MCNC: 99 MG/DL (ref 65–99)
HCO3 BLDA-SCNC: 17.9 MMOL/L (ref 20–26)
HCT VFR BLD AUTO: 19.2 % (ref 34–46.6)
HCT VFR BLD AUTO: 20.6 % (ref 34–46.6)
HGB BLD-MCNC: 6.7 G/DL (ref 12–15.9)
HGB BLD-MCNC: 7 G/DL (ref 12–15.9)
INHALED O2 CONCENTRATION: 30 %
IRON 24H UR-MRATE: 51 MCG/DL (ref 37–145)
IRON SATN MFR SERPL: 22 % (ref 20–50)
LYMPHOCYTES # BLD MANUAL: 1.38 10*3/MM3 (ref 0.7–3.1)
LYMPHOCYTES NFR BLD MANUAL: 11 % (ref 5–12)
LYMPHOCYTES NFR BLD MANUAL: 8 % (ref 19.6–45.3)
Lab: ABNORMAL
METAMYELOCYTES NFR BLD MANUAL: 1 % (ref 0–0)
MODALITY: ABNORMAL
MONOCYTES # BLD AUTO: 1.89 10*3/MM3 (ref 0.1–0.9)
MYELOCYTES NFR BLD MANUAL: 4 % (ref 0–0)
NEUTROPHILS # BLD AUTO: 12.56 10*3/MM3 (ref 1.7–7)
NEUTROPHILS NFR BLD MANUAL: 72 % (ref 42.7–76)
NEUTS BAND NFR BLD MANUAL: 1 % (ref 0–5)
NRBC SPEC MANUAL: 1 /100 WBC (ref 0–0.2)
PCO2 BLDA: 40.5 MM HG (ref 35–45)
PCO2 TEMP ADJ BLD: 40.5 MM HG (ref 35–45)
PEEP RESPIRATORY: 5 CM[H2O]
PH BLDA: 7.25 PH UNITS (ref 7.35–7.45)
PH, TEMP CORRECTED: 7.25 PH UNITS (ref 7.35–7.45)
PLAT MORPH BLD: NORMAL
PO2 BLDA: 98 MM HG (ref 83–108)
PO2 TEMP ADJ BLD: 98 MM HG (ref 83–108)
POIKILOCYTOSIS BLD QL SMEAR: ABNORMAL
POLYCHROMASIA BLD QL SMEAR: ABNORMAL
POTASSIUM SERPL-SCNC: 4 MMOL/L (ref 3.5–5.2)
POTASSIUM SERPL-SCNC: 4.1 MMOL/L (ref 3.5–5.2)
POTASSIUM SERPL-SCNC: 4.3 MMOL/L (ref 3.5–5.2)
RH BLD: POSITIVE
ROULEAUX BLD QL SMEAR: ABNORMAL
SAO2 % BLDCOA: 97.3 % (ref 94–99)
SET MECH RESP RATE: 18
SODIUM SERPL-SCNC: 129 MMOL/L (ref 136–145)
SODIUM SERPL-SCNC: 129 MMOL/L (ref 136–145)
SODIUM SERPL-SCNC: 131 MMOL/L (ref 136–145)
T&S EXPIRATION DATE: NORMAL
TIBC SERPL-MCNC: 237 MCG/DL (ref 298–536)
TRANSFERRIN SERPL-MCNC: 159 MG/DL (ref 200–360)
UNIT  ABO: NORMAL
UNIT  RH: NORMAL
VENTILATOR MODE: AC
VT ON VENT VENT: 450 ML
WBC MORPH BLD: NORMAL

## 2021-09-08 PROCEDURE — 80048 BASIC METABOLIC PNL TOTAL CA: CPT | Performed by: INTERNAL MEDICINE

## 2021-09-08 PROCEDURE — 82803 BLOOD GASES ANY COMBINATION: CPT

## 2021-09-08 PROCEDURE — 06HM33Z INSERTION OF INFUSION DEVICE INTO RIGHT FEMORAL VEIN, PERCUTANEOUS APPROACH: ICD-10-PCS | Performed by: SURGERY

## 2021-09-08 PROCEDURE — 83540 ASSAY OF IRON: CPT | Performed by: INTERNAL MEDICINE

## 2021-09-08 PROCEDURE — 25010000002 PROPOFOL 10 MG/ML EMULSION: Performed by: INTERNAL MEDICINE

## 2021-09-08 PROCEDURE — 86900 BLOOD TYPING SEROLOGIC ABO: CPT

## 2021-09-08 PROCEDURE — 85014 HEMATOCRIT: CPT | Performed by: INTERNAL MEDICINE

## 2021-09-08 PROCEDURE — 94799 UNLISTED PULMONARY SVC/PX: CPT

## 2021-09-08 PROCEDURE — P9016 RBC LEUKOCYTES REDUCED: HCPCS

## 2021-09-08 PROCEDURE — 99233 SBSQ HOSP IP/OBS HIGH 50: CPT | Performed by: INTERNAL MEDICINE

## 2021-09-08 PROCEDURE — 99232 SBSQ HOSP IP/OBS MODERATE 35: CPT | Performed by: UROLOGY

## 2021-09-08 PROCEDURE — 86901 BLOOD TYPING SEROLOGIC RH(D): CPT | Performed by: INTERNAL MEDICINE

## 2021-09-08 PROCEDURE — 86850 RBC ANTIBODY SCREEN: CPT | Performed by: INTERNAL MEDICINE

## 2021-09-08 PROCEDURE — 82962 GLUCOSE BLOOD TEST: CPT

## 2021-09-08 PROCEDURE — 36556 INSERT NON-TUNNEL CV CATH: CPT | Performed by: SURGERY

## 2021-09-08 PROCEDURE — 25010000002 FENTANYL CITRATE (PF) 2500 MCG/50ML SOLUTION: Performed by: INTERNAL MEDICINE

## 2021-09-08 PROCEDURE — 84466 ASSAY OF TRANSFERRIN: CPT | Performed by: INTERNAL MEDICINE

## 2021-09-08 PROCEDURE — 0JH63XZ INSERTION OF TUNNELED VASCULAR ACCESS DEVICE INTO CHEST SUBCUTANEOUS TISSUE AND FASCIA, PERCUTANEOUS APPROACH: ICD-10-PCS | Performed by: SURGERY

## 2021-09-08 PROCEDURE — 94003 VENT MGMT INPAT SUBQ DAY: CPT

## 2021-09-08 PROCEDURE — 85018 HEMOGLOBIN: CPT | Performed by: INTERNAL MEDICINE

## 2021-09-08 PROCEDURE — 71045 X-RAY EXAM CHEST 1 VIEW: CPT

## 2021-09-08 PROCEDURE — 86900 BLOOD TYPING SEROLOGIC ABO: CPT | Performed by: INTERNAL MEDICINE

## 2021-09-08 PROCEDURE — B54BZZA ULTRASONOGRAPHY OF RIGHT LOWER EXTREMITY VEINS, GUIDANCE: ICD-10-PCS | Performed by: SURGERY

## 2021-09-08 PROCEDURE — 86923 COMPATIBILITY TEST ELECTRIC: CPT

## 2021-09-08 RX ORDER — SODIUM CITRATE 4 % (3 ML)
2.4 SYRINGE (ML) MISCELLANEOUS
Status: COMPLETED | OUTPATIENT
Start: 2021-09-08 | End: 2021-09-08

## 2021-09-08 RX ADMIN — LABETALOL HYDROCHLORIDE 20 MG: 5 INJECTION, SOLUTION INTRAVENOUS at 22:45

## 2021-09-08 RX ADMIN — PROPOFOL 25 MCG/KG/MIN: 10 INJECTION, EMULSION INTRAVENOUS at 16:02

## 2021-09-08 RX ADMIN — PROPOFOL 30 MCG/KG/MIN: 10 INJECTION, EMULSION INTRAVENOUS at 01:51

## 2021-09-08 RX ADMIN — ALBUTEROL SULFATE 6 PUFF: 108 INHALANT RESPIRATORY (INHALATION) at 06:32

## 2021-09-08 RX ADMIN — SODIUM CHLORIDE, PRESERVATIVE FREE 10 ML: 5 INJECTION INTRAVENOUS at 09:14

## 2021-09-08 RX ADMIN — LABETALOL HYDROCHLORIDE 20 MG: 5 INJECTION, SOLUTION INTRAVENOUS at 08:14

## 2021-09-08 RX ADMIN — GUAIFENESIN 400 MG: 100 SOLUTION ORAL at 17:26

## 2021-09-08 RX ADMIN — GUAIFENESIN 400 MG: 100 SOLUTION ORAL at 11:48

## 2021-09-08 RX ADMIN — PROPOFOL 25 MCG/KG/MIN: 10 INJECTION, EMULSION INTRAVENOUS at 20:23

## 2021-09-08 RX ADMIN — OXYCODONE HYDROCHLORIDE AND ACETAMINOPHEN 500 MG: 500 TABLET ORAL at 09:14

## 2021-09-08 RX ADMIN — GUAIFENESIN 400 MG: 100 SOLUTION ORAL at 05:35

## 2021-09-08 RX ADMIN — Medication 2 PACKET: at 09:14

## 2021-09-08 RX ADMIN — FENTANYL CITRATE 100 MCG/HR: 50 INJECTION INTRAVENOUS at 16:07

## 2021-09-08 RX ADMIN — SODIUM CHLORIDE, PRESERVATIVE FREE 10 ML: 5 INJECTION INTRAVENOUS at 20:58

## 2021-09-08 RX ADMIN — PROPOFOL 30 MCG/KG/MIN: 10 INJECTION, EMULSION INTRAVENOUS at 05:35

## 2021-09-08 RX ADMIN — FAMOTIDINE 20 MG: 10 INJECTION INTRAVENOUS at 09:14

## 2021-09-08 RX ADMIN — ALBUTEROL SULFATE 6 PUFF: 108 INHALANT RESPIRATORY (INHALATION) at 14:22

## 2021-09-08 RX ADMIN — Medication 2 PACKET: at 16:32

## 2021-09-08 RX ADMIN — ALBUTEROL SULFATE 6 PUFF: 108 INHALANT RESPIRATORY (INHALATION) at 23:21

## 2021-09-08 RX ADMIN — Medication 2 PACKET: at 20:58

## 2021-09-08 RX ADMIN — DOCUSATE SODIUM 50 MG AND SENNOSIDES 8.6 MG 1 TABLET: 8.6; 5 TABLET, FILM COATED ORAL at 09:14

## 2021-09-08 RX ADMIN — ALBUTEROL SULFATE 6 PUFF: 108 INHALANT RESPIRATORY (INHALATION) at 19:04

## 2021-09-08 RX ADMIN — CHLORHEXIDINE GLUCONATE 0.12% ORAL RINSE 15 ML: 1.2 LIQUID ORAL at 20:57

## 2021-09-08 RX ADMIN — Medication 2.4 ML: at 17:00

## 2021-09-08 RX ADMIN — CHLORHEXIDINE GLUCONATE 0.12% ORAL RINSE 15 ML: 1.2 LIQUID ORAL at 09:15

## 2021-09-08 RX ADMIN — LABETALOL HYDROCHLORIDE 20 MG: 5 INJECTION, SOLUTION INTRAVENOUS at 17:29

## 2021-09-08 RX ADMIN — PROPOFOL 25 MCG/KG/MIN: 10 INJECTION, EMULSION INTRAVENOUS at 09:13

## 2021-09-08 RX ADMIN — ZINC SULFATE 220 MG (50 MG) CAPSULE 220 MG: CAPSULE at 09:14

## 2021-09-08 RX ADMIN — DOCUSATE SODIUM 50 MG AND SENNOSIDES 8.6 MG 1 TABLET: 8.6; 5 TABLET, FILM COATED ORAL at 20:58

## 2021-09-08 RX ADMIN — Medication 1000 UNITS: at 09:14

## 2021-09-09 LAB
ANION GAP SERPL CALCULATED.3IONS-SCNC: 19 MMOL/L (ref 5–15)
ANION GAP SERPL CALCULATED.3IONS-SCNC: 20 MMOL/L (ref 5–15)
BH BB BLOOD EXPIRATION DATE: NORMAL
BH BB BLOOD TYPE BARCODE: 6200
BH BB DISPENSE STATUS: NORMAL
BH BB PRODUCT CODE: NORMAL
BH BB UNIT NUMBER: NORMAL
BUN SERPL-MCNC: 79 MG/DL (ref 6–20)
BUN SERPL-MCNC: 79 MG/DL (ref 6–20)
BUN/CREAT SERPL: 17.4 (ref 7–25)
BUN/CREAT SERPL: 17.5 (ref 7–25)
CALCIUM SPEC-SCNC: 8.1 MG/DL (ref 8.6–10.5)
CALCIUM SPEC-SCNC: 8.2 MG/DL (ref 8.6–10.5)
CHLORIDE SERPL-SCNC: 91 MMOL/L (ref 98–107)
CHLORIDE SERPL-SCNC: 92 MMOL/L (ref 98–107)
CO2 SERPL-SCNC: 18 MMOL/L (ref 22–29)
CO2 SERPL-SCNC: 19 MMOL/L (ref 22–29)
CREAT SERPL-MCNC: 4.51 MG/DL (ref 0.57–1)
CREAT SERPL-MCNC: 4.53 MG/DL (ref 0.57–1)
CROSSMATCH INTERPRETATION: NORMAL
GFR SERPL CREATININE-BSD FRML MDRD: 11 ML/MIN/1.73
GFR SERPL CREATININE-BSD FRML MDRD: 11 ML/MIN/1.73
GFR SERPL CREATININE-BSD FRML MDRD: ABNORMAL ML/MIN/{1.73_M2}
GFR SERPL CREATININE-BSD FRML MDRD: ABNORMAL ML/MIN/{1.73_M2}
GLUCOSE BLDC GLUCOMTR-MCNC: 102 MG/DL (ref 70–130)
GLUCOSE BLDC GLUCOMTR-MCNC: 109 MG/DL (ref 70–130)
GLUCOSE BLDC GLUCOMTR-MCNC: 114 MG/DL (ref 70–130)
GLUCOSE BLDC GLUCOMTR-MCNC: 94 MG/DL (ref 70–130)
GLUCOSE SERPL-MCNC: 109 MG/DL (ref 65–99)
GLUCOSE SERPL-MCNC: 83 MG/DL (ref 65–99)
HCT VFR BLD AUTO: 20.6 % (ref 34–46.6)
HCT VFR BLD AUTO: 23.8 % (ref 34–46.6)
HCT VFR BLD AUTO: 25.7 % (ref 34–46.6)
HGB BLD-MCNC: 7.2 G/DL (ref 12–15.9)
HGB BLD-MCNC: 8.3 G/DL (ref 12–15.9)
HGB BLD-MCNC: 8.9 G/DL (ref 12–15.9)
POTASSIUM SERPL-SCNC: 3.6 MMOL/L (ref 3.5–5.2)
POTASSIUM SERPL-SCNC: 4.1 MMOL/L (ref 3.5–5.2)
SODIUM SERPL-SCNC: 129 MMOL/L (ref 136–145)
SODIUM SERPL-SCNC: 130 MMOL/L (ref 136–145)
UNIT  ABO: NORMAL
UNIT  RH: NORMAL

## 2021-09-09 PROCEDURE — 94799 UNLISTED PULMONARY SVC/PX: CPT

## 2021-09-09 PROCEDURE — 94003 VENT MGMT INPAT SUBQ DAY: CPT

## 2021-09-09 PROCEDURE — 85014 HEMATOCRIT: CPT | Performed by: INTERNAL MEDICINE

## 2021-09-09 PROCEDURE — P9047 ALBUMIN (HUMAN), 25%, 50ML: HCPCS | Performed by: INTERNAL MEDICINE

## 2021-09-09 PROCEDURE — 85018 HEMOGLOBIN: CPT | Performed by: INTERNAL MEDICINE

## 2021-09-09 PROCEDURE — 99233 SBSQ HOSP IP/OBS HIGH 50: CPT | Performed by: INTERNAL MEDICINE

## 2021-09-09 PROCEDURE — 80048 BASIC METABOLIC PNL TOTAL CA: CPT | Performed by: INTERNAL MEDICINE

## 2021-09-09 PROCEDURE — 25010000002 PROPOFOL 10 MG/ML EMULSION: Performed by: INTERNAL MEDICINE

## 2021-09-09 PROCEDURE — 25010000002 ALBUMIN HUMAN 25% PER 50 ML: Performed by: INTERNAL MEDICINE

## 2021-09-09 PROCEDURE — 25010000002 MORPHINE SULFATE (PF) 2 MG/ML SOLUTION: Performed by: INTERNAL MEDICINE

## 2021-09-09 PROCEDURE — 82962 GLUCOSE BLOOD TEST: CPT

## 2021-09-09 RX ORDER — MORPHINE SULFATE 2 MG/ML
1 INJECTION, SOLUTION INTRAMUSCULAR; INTRAVENOUS EVERY 4 HOURS PRN
Status: DISCONTINUED | OUTPATIENT
Start: 2021-09-09 | End: 2021-09-16

## 2021-09-09 RX ORDER — ALBUMIN (HUMAN) 12.5 G/50ML
50 SOLUTION INTRAVENOUS AS NEEDED
Status: DISPENSED | OUTPATIENT
Start: 2021-09-09 | End: 2021-09-10

## 2021-09-09 RX ORDER — WHEY PROTEIN ISOLATE 6 G-25/7 G
2 POWDER (GRAM) ORAL 2 TIMES DAILY
Status: DISCONTINUED | OUTPATIENT
Start: 2021-09-09 | End: 2021-09-10

## 2021-09-09 RX ORDER — MORPHINE SULFATE 2 MG/ML
2 INJECTION, SOLUTION INTRAMUSCULAR; INTRAVENOUS EVERY 4 HOURS PRN
Status: DISCONTINUED | OUTPATIENT
Start: 2021-09-09 | End: 2021-09-16

## 2021-09-09 RX ORDER — SODIUM CITRATE 4 % (3 ML)
4 SYRINGE (ML) MISCELLANEOUS AS NEEDED
Status: DISCONTINUED | OUTPATIENT
Start: 2021-09-09 | End: 2021-09-24 | Stop reason: HOSPADM

## 2021-09-09 RX ADMIN — ALBUTEROL SULFATE 6 PUFF: 108 INHALANT RESPIRATORY (INHALATION) at 15:11

## 2021-09-09 RX ADMIN — PROPOFOL 40 MCG/KG/MIN: 10 INJECTION, EMULSION INTRAVENOUS at 21:24

## 2021-09-09 RX ADMIN — DOCUSATE SODIUM 50 MG AND SENNOSIDES 8.6 MG 1 TABLET: 8.6; 5 TABLET, FILM COATED ORAL at 21:23

## 2021-09-09 RX ADMIN — MORPHINE SULFATE 2 MG: 2 INJECTION, SOLUTION INTRAMUSCULAR; INTRAVENOUS at 19:55

## 2021-09-09 RX ADMIN — SODIUM CHLORIDE, POTASSIUM CHLORIDE, SODIUM LACTATE AND CALCIUM CHLORIDE 20 ML/HR: 600; 310; 30; 20 INJECTION, SOLUTION INTRAVENOUS at 04:27

## 2021-09-09 RX ADMIN — ALBUTEROL SULFATE 6 PUFF: 108 INHALANT RESPIRATORY (INHALATION) at 06:58

## 2021-09-09 RX ADMIN — GUAIFENESIN 400 MG: 100 SOLUTION ORAL at 00:30

## 2021-09-09 RX ADMIN — PROPOFOL 25 MCG/KG/MIN: 10 INJECTION, EMULSION INTRAVENOUS at 08:45

## 2021-09-09 RX ADMIN — Medication 1000 UNITS: at 08:20

## 2021-09-09 RX ADMIN — GUAIFENESIN 400 MG: 100 SOLUTION ORAL at 05:40

## 2021-09-09 RX ADMIN — SODIUM CHLORIDE, PRESERVATIVE FREE 10 ML: 5 INJECTION INTRAVENOUS at 21:23

## 2021-09-09 RX ADMIN — Medication 4 ML: at 12:05

## 2021-09-09 RX ADMIN — ALBUMIN HUMAN 50 G: 0.25 SOLUTION INTRAVENOUS at 09:00

## 2021-09-09 RX ADMIN — ZINC SULFATE 220 MG (50 MG) CAPSULE 220 MG: CAPSULE at 08:20

## 2021-09-09 RX ADMIN — OXYCODONE HYDROCHLORIDE AND ACETAMINOPHEN 500 MG: 500 TABLET ORAL at 08:20

## 2021-09-09 RX ADMIN — FAMOTIDINE 20 MG: 10 INJECTION INTRAVENOUS at 08:20

## 2021-09-09 RX ADMIN — PROPOFOL 25 MCG/KG/MIN: 10 INJECTION, EMULSION INTRAVENOUS at 01:25

## 2021-09-09 RX ADMIN — PROPOFOL 35 MCG/KG/MIN: 10 INJECTION, EMULSION INTRAVENOUS at 13:06

## 2021-09-09 RX ADMIN — ALBUTEROL SULFATE 6 PUFF: 108 INHALANT RESPIRATORY (INHALATION) at 20:10

## 2021-09-09 RX ADMIN — SODIUM CHLORIDE, PRESERVATIVE FREE 10 ML: 5 INJECTION INTRAVENOUS at 08:21

## 2021-09-09 RX ADMIN — ALBUTEROL SULFATE 6 PUFF: 108 INHALANT RESPIRATORY (INHALATION) at 23:06

## 2021-09-09 RX ADMIN — Medication 2 PACKET: at 17:26

## 2021-09-09 RX ADMIN — PROPOFOL 35 MCG/KG/MIN: 10 INJECTION, EMULSION INTRAVENOUS at 17:49

## 2021-09-09 RX ADMIN — CHLORHEXIDINE GLUCONATE 0.12% ORAL RINSE 15 ML: 1.2 LIQUID ORAL at 08:21

## 2021-09-09 RX ADMIN — MORPHINE SULFATE 1 MG: 2 INJECTION, SOLUTION INTRAMUSCULAR; INTRAVENOUS at 14:00

## 2021-09-09 RX ADMIN — Medication 2 PACKET: at 08:31

## 2021-09-09 RX ADMIN — DOCUSATE SODIUM 50 MG AND SENNOSIDES 8.6 MG 1 TABLET: 8.6; 5 TABLET, FILM COATED ORAL at 08:20

## 2021-09-09 RX ADMIN — CHLORHEXIDINE GLUCONATE 0.12% ORAL RINSE 15 ML: 1.2 LIQUID ORAL at 21:23

## 2021-09-09 RX ADMIN — PROPOFOL 25 MCG/KG/MIN: 10 INJECTION, EMULSION INTRAVENOUS at 05:47

## 2021-09-09 RX ADMIN — LABETALOL HYDROCHLORIDE 20 MG: 5 INJECTION, SOLUTION INTRAVENOUS at 16:04

## 2021-09-10 ENCOUNTER — APPOINTMENT (OUTPATIENT)
Dept: CT IMAGING | Facility: HOSPITAL | Age: 46
End: 2021-09-10

## 2021-09-10 LAB
ALBUMIN SERPL-MCNC: 3.4 G/DL (ref 3.5–5.2)
ALBUMIN/GLOB SERPL: 1.2 G/DL
ALP SERPL-CCNC: 67 U/L (ref 39–117)
ALT SERPL W P-5'-P-CCNC: 19 U/L (ref 1–33)
ANION GAP SERPL CALCULATED.3IONS-SCNC: 20 MMOL/L (ref 5–15)
ARTERIAL PATENCY WRIST A: ABNORMAL
ARTERIAL PATENCY WRIST A: POSITIVE
AST SERPL-CCNC: 23 U/L (ref 1–32)
ATMOSPHERIC PRESS: 752 MMHG
ATMOSPHERIC PRESS: 753 MMHG
BASE EXCESS BLDA CALC-SCNC: -3.2 MMOL/L (ref 0–2)
BASE EXCESS BLDA CALC-SCNC: -3.2 MMOL/L (ref 0–2)
BDY SITE: ABNORMAL
BDY SITE: ABNORMAL
BILIRUB SERPL-MCNC: 0.9 MG/DL (ref 0–1.2)
BODY TEMPERATURE: 37 C
BODY TEMPERATURE: 37 C
BUN SERPL-MCNC: 60 MG/DL (ref 6–20)
BUN/CREAT SERPL: 15.6 (ref 7–25)
CALCIUM SPEC-SCNC: 8.7 MG/DL (ref 8.6–10.5)
CHLORIDE SERPL-SCNC: 94 MMOL/L (ref 98–107)
CO2 SERPL-SCNC: 20 MMOL/L (ref 22–29)
CREAT SERPL-MCNC: 3.84 MG/DL (ref 0.57–1)
DEPRECATED RDW RBC AUTO: 48.6 FL (ref 37–54)
ERYTHROCYTE [DISTWIDTH] IN BLOOD BY AUTOMATED COUNT: 15.5 % (ref 12.3–15.4)
GFR SERPL CREATININE-BSD FRML MDRD: 13 ML/MIN/1.73
GFR SERPL CREATININE-BSD FRML MDRD: ABNORMAL ML/MIN/{1.73_M2}
GLOBULIN UR ELPH-MCNC: 2.9 GM/DL
GLUCOSE BLDC GLUCOMTR-MCNC: 116 MG/DL (ref 70–130)
GLUCOSE BLDC GLUCOMTR-MCNC: 119 MG/DL (ref 70–130)
GLUCOSE BLDC GLUCOMTR-MCNC: 133 MG/DL (ref 70–130)
GLUCOSE BLDC GLUCOMTR-MCNC: 135 MG/DL (ref 70–130)
GLUCOSE SERPL-MCNC: 111 MG/DL (ref 65–99)
HCO3 BLDA-SCNC: 21.4 MMOL/L (ref 20–26)
HCO3 BLDA-SCNC: 23.1 MMOL/L (ref 20–26)
HCT VFR BLD AUTO: 21.2 % (ref 34–46.6)
HCT VFR BLD AUTO: 25.1 % (ref 34–46.6)
HGB BLD-MCNC: 7.4 G/DL (ref 12–15.9)
HGB BLD-MCNC: 8.8 G/DL (ref 12–15.9)
INHALED O2 CONCENTRATION: 30 %
INHALED O2 CONCENTRATION: 30 %
Lab: ABNORMAL
Lab: ABNORMAL
MAGNESIUM SERPL-MCNC: 2.2 MG/DL (ref 1.6–2.6)
MCH RBC QN AUTO: 31.6 PG (ref 26.6–33)
MCHC RBC AUTO-ENTMCNC: 34.9 G/DL (ref 31.5–35.7)
MCV RBC AUTO: 90.6 FL (ref 79–97)
MODALITY: ABNORMAL
MODALITY: ABNORMAL
PCO2 BLDA: 35.2 MM HG (ref 35–45)
PCO2 BLDA: 47.1 MM HG (ref 35–45)
PCO2 TEMP ADJ BLD: 35.2 MM HG (ref 35–45)
PCO2 TEMP ADJ BLD: 47.1 MM HG (ref 35–45)
PEEP RESPIRATORY: 5 CM[H2O]
PEEP RESPIRATORY: 5 CM[H2O]
PH BLDA: 7.3 PH UNITS (ref 7.35–7.45)
PH BLDA: 7.39 PH UNITS (ref 7.35–7.45)
PH, TEMP CORRECTED: 7.3 PH UNITS (ref 7.35–7.45)
PH, TEMP CORRECTED: 7.39 PH UNITS (ref 7.35–7.45)
PLATELET # BLD AUTO: 286 10*3/MM3 (ref 140–450)
PMV BLD AUTO: 9.8 FL (ref 6–12)
PO2 BLDA: 74 MM HG (ref 83–108)
PO2 BLDA: 79.4 MM HG (ref 83–108)
PO2 TEMP ADJ BLD: 74 MM HG (ref 83–108)
PO2 TEMP ADJ BLD: 79.4 MM HG (ref 83–108)
POTASSIUM SERPL-SCNC: 3.2 MMOL/L (ref 3.5–5.2)
PROT SERPL-MCNC: 6.3 G/DL (ref 6–8.5)
PSV: 8 CMH2O
RBC # BLD AUTO: 2.34 10*6/MM3 (ref 3.77–5.28)
SAO2 % BLDCOA: 93.6 % (ref 94–99)
SAO2 % BLDCOA: 96.5 % (ref 94–99)
SET MECH RESP RATE: 18
SODIUM SERPL-SCNC: 134 MMOL/L (ref 136–145)
VENTILATOR MODE: ABNORMAL
VENTILATOR MODE: AC
VT ON VENT VENT: 450 ML
WBC # BLD AUTO: 17.03 10*3/MM3 (ref 3.4–10.8)

## 2021-09-10 PROCEDURE — 85018 HEMOGLOBIN: CPT | Performed by: INTERNAL MEDICINE

## 2021-09-10 PROCEDURE — 25010000003 POTASSIUM CHLORIDE PER 2 MEQ: Performed by: NURSE PRACTITIONER

## 2021-09-10 PROCEDURE — 82803 BLOOD GASES ANY COMBINATION: CPT

## 2021-09-10 PROCEDURE — 94799 UNLISTED PULMONARY SVC/PX: CPT

## 2021-09-10 PROCEDURE — 99233 SBSQ HOSP IP/OBS HIGH 50: CPT | Performed by: INTERNAL MEDICINE

## 2021-09-10 PROCEDURE — 85014 HEMATOCRIT: CPT | Performed by: INTERNAL MEDICINE

## 2021-09-10 PROCEDURE — 85027 COMPLETE CBC AUTOMATED: CPT | Performed by: INTERNAL MEDICINE

## 2021-09-10 PROCEDURE — 25010000002 PROPOFOL 10 MG/ML EMULSION: Performed by: INTERNAL MEDICINE

## 2021-09-10 PROCEDURE — 25010000002 MORPHINE SULFATE (PF) 2 MG/ML SOLUTION: Performed by: INTERNAL MEDICINE

## 2021-09-10 PROCEDURE — 83735 ASSAY OF MAGNESIUM: CPT | Performed by: NURSE PRACTITIONER

## 2021-09-10 PROCEDURE — 25010000002 LORAZEPAM PER 2 MG: Performed by: INTERNAL MEDICINE

## 2021-09-10 PROCEDURE — 82962 GLUCOSE BLOOD TEST: CPT

## 2021-09-10 PROCEDURE — 94003 VENT MGMT INPAT SUBQ DAY: CPT

## 2021-09-10 PROCEDURE — 36600 WITHDRAWAL OF ARTERIAL BLOOD: CPT

## 2021-09-10 PROCEDURE — 74176 CT ABD & PELVIS W/O CONTRAST: CPT

## 2021-09-10 PROCEDURE — 80053 COMPREHEN METABOLIC PANEL: CPT | Performed by: INTERNAL MEDICINE

## 2021-09-10 RX ORDER — POTASSIUM CHLORIDE 29.8 MG/ML
20 INJECTION INTRAVENOUS ONCE
Status: COMPLETED | OUTPATIENT
Start: 2021-09-10 | End: 2021-09-10

## 2021-09-10 RX ORDER — LEVONORGESTREL AND ETHINYL ESTRADIOL 0.15-0.03
1 KIT ORAL DAILY
COMMUNITY
End: 2021-09-24 | Stop reason: HOSPADM

## 2021-09-10 RX ADMIN — LABETALOL HYDROCHLORIDE 20 MG: 5 INJECTION, SOLUTION INTRAVENOUS at 08:16

## 2021-09-10 RX ADMIN — ZINC SULFATE 220 MG (50 MG) CAPSULE 220 MG: CAPSULE at 08:05

## 2021-09-10 RX ADMIN — PROPOFOL 55 MCG/KG/MIN: 10 INJECTION, EMULSION INTRAVENOUS at 00:26

## 2021-09-10 RX ADMIN — LABETALOL HYDROCHLORIDE 20 MG: 5 INJECTION, SOLUTION INTRAVENOUS at 20:01

## 2021-09-10 RX ADMIN — MORPHINE SULFATE 1 MG: 2 INJECTION, SOLUTION INTRAMUSCULAR; INTRAVENOUS at 07:55

## 2021-09-10 RX ADMIN — POTASSIUM CHLORIDE 20 MEQ: 29.8 INJECTION, SOLUTION INTRAVENOUS at 10:33

## 2021-09-10 RX ADMIN — LORAZEPAM 0.25 MG: 2 INJECTION INTRAMUSCULAR; INTRAVENOUS at 00:29

## 2021-09-10 RX ADMIN — CHLORHEXIDINE GLUCONATE 0.12% ORAL RINSE 15 ML: 1.2 LIQUID ORAL at 08:08

## 2021-09-10 RX ADMIN — ALBUTEROL SULFATE 6 PUFF: 108 INHALANT RESPIRATORY (INHALATION) at 07:33

## 2021-09-10 RX ADMIN — DOCUSATE SODIUM 50 MG AND SENNOSIDES 8.6 MG 1 TABLET: 8.6; 5 TABLET, FILM COATED ORAL at 08:05

## 2021-09-10 RX ADMIN — ALBUTEROL SULFATE 6 PUFF: 108 INHALANT RESPIRATORY (INHALATION) at 19:21

## 2021-09-10 RX ADMIN — SODIUM CHLORIDE, PRESERVATIVE FREE 10 ML: 5 INJECTION INTRAVENOUS at 08:08

## 2021-09-10 RX ADMIN — FAMOTIDINE 20 MG: 10 INJECTION INTRAVENOUS at 08:03

## 2021-09-10 RX ADMIN — Medication 2 PACKET: at 08:05

## 2021-09-10 RX ADMIN — Medication 1000 UNITS: at 08:05

## 2021-09-10 RX ADMIN — Medication 4 ML: at 18:31

## 2021-09-10 RX ADMIN — ALBUTEROL SULFATE 6 PUFF: 108 INHALANT RESPIRATORY (INHALATION) at 12:18

## 2021-09-10 RX ADMIN — MORPHINE SULFATE 1 MG: 2 INJECTION, SOLUTION INTRAMUSCULAR; INTRAVENOUS at 13:53

## 2021-09-10 RX ADMIN — PROPOFOL 60 MCG/KG/MIN: 10 INJECTION, EMULSION INTRAVENOUS at 03:49

## 2021-09-10 RX ADMIN — OXYCODONE HYDROCHLORIDE AND ACETAMINOPHEN 500 MG: 500 TABLET ORAL at 08:05

## 2021-09-10 RX ADMIN — MORPHINE SULFATE 2 MG: 2 INJECTION, SOLUTION INTRAMUSCULAR; INTRAVENOUS at 00:28

## 2021-09-10 RX ADMIN — PROPOFOL 50 MCG/KG/MIN: 10 INJECTION, EMULSION INTRAVENOUS at 06:14

## 2021-09-11 ENCOUNTER — APPOINTMENT (OUTPATIENT)
Dept: GENERAL RADIOLOGY | Facility: HOSPITAL | Age: 46
End: 2021-09-11

## 2021-09-11 LAB
ANION GAP SERPL CALCULATED.3IONS-SCNC: 18 MMOL/L (ref 5–15)
BUN SERPL-MCNC: 48 MG/DL (ref 6–20)
BUN/CREAT SERPL: 14.2 (ref 7–25)
CALCIUM SPEC-SCNC: 9.2 MG/DL (ref 8.6–10.5)
CHLORIDE SERPL-SCNC: 97 MMOL/L (ref 98–107)
CO2 SERPL-SCNC: 22 MMOL/L (ref 22–29)
CREAT SERPL-MCNC: 3.37 MG/DL (ref 0.57–1)
DEPRECATED RDW RBC AUTO: 51.3 FL (ref 37–54)
ERYTHROCYTE [DISTWIDTH] IN BLOOD BY AUTOMATED COUNT: 15.7 % (ref 12.3–15.4)
GFR SERPL CREATININE-BSD FRML MDRD: 15 ML/MIN/1.73
GLUCOSE BLDC GLUCOMTR-MCNC: 110 MG/DL (ref 70–130)
GLUCOSE BLDC GLUCOMTR-MCNC: 89 MG/DL (ref 70–130)
GLUCOSE BLDC GLUCOMTR-MCNC: 90 MG/DL (ref 70–130)
GLUCOSE SERPL-MCNC: 88 MG/DL (ref 65–99)
HCT VFR BLD AUTO: 22.7 % (ref 34–46.6)
HGB BLD-MCNC: 7.9 G/DL (ref 12–15.9)
MCH RBC QN AUTO: 32.1 PG (ref 26.6–33)
MCHC RBC AUTO-ENTMCNC: 34.8 G/DL (ref 31.5–35.7)
MCV RBC AUTO: 92.3 FL (ref 79–97)
PLATELET # BLD AUTO: 291 10*3/MM3 (ref 140–450)
PMV BLD AUTO: 9.7 FL (ref 6–12)
POTASSIUM SERPL-SCNC: 3.6 MMOL/L (ref 3.5–5.2)
RBC # BLD AUTO: 2.46 10*6/MM3 (ref 3.77–5.28)
SODIUM SERPL-SCNC: 137 MMOL/L (ref 136–145)
WBC # BLD AUTO: 17.94 10*3/MM3 (ref 3.4–10.8)

## 2021-09-11 PROCEDURE — 25010000002 HYDRALAZINE PER 20 MG: Performed by: INTERNAL MEDICINE

## 2021-09-11 PROCEDURE — 74018 RADEX ABDOMEN 1 VIEW: CPT

## 2021-09-11 PROCEDURE — 94799 UNLISTED PULMONARY SVC/PX: CPT

## 2021-09-11 PROCEDURE — 25010000002 LORAZEPAM PER 2 MG: Performed by: INTERNAL MEDICINE

## 2021-09-11 PROCEDURE — 80048 BASIC METABOLIC PNL TOTAL CA: CPT | Performed by: INTERNAL MEDICINE

## 2021-09-11 PROCEDURE — 25010000002 MORPHINE SULFATE (PF) 2 MG/ML SOLUTION: Performed by: INTERNAL MEDICINE

## 2021-09-11 PROCEDURE — 99232 SBSQ HOSP IP/OBS MODERATE 35: CPT | Performed by: INTERNAL MEDICINE

## 2021-09-11 PROCEDURE — 85027 COMPLETE CBC AUTOMATED: CPT | Performed by: INTERNAL MEDICINE

## 2021-09-11 PROCEDURE — 25010000002 DEXAMETHASONE PER 1 MG: Performed by: NURSE PRACTITIONER

## 2021-09-11 PROCEDURE — 82962 GLUCOSE BLOOD TEST: CPT

## 2021-09-11 RX ORDER — DEXAMETHASONE SODIUM PHOSPHATE 4 MG/ML
4 INJECTION, SOLUTION INTRA-ARTICULAR; INTRALESIONAL; INTRAMUSCULAR; INTRAVENOUS; SOFT TISSUE DAILY
Status: DISCONTINUED | OUTPATIENT
Start: 2021-09-11 | End: 2021-09-12

## 2021-09-11 RX ORDER — HYDRALAZINE HYDROCHLORIDE 20 MG/ML
10 INJECTION INTRAMUSCULAR; INTRAVENOUS EVERY 6 HOURS PRN
Status: DISCONTINUED | OUTPATIENT
Start: 2021-09-11 | End: 2021-09-24 | Stop reason: HOSPADM

## 2021-09-11 RX ORDER — DEXAMETHASONE SODIUM PHOSPHATE 4 MG/ML
4 INJECTION, SOLUTION INTRA-ARTICULAR; INTRALESIONAL; INTRAMUSCULAR; INTRAVENOUS; SOFT TISSUE EVERY 6 HOURS
Status: DISCONTINUED | OUTPATIENT
Start: 2021-09-11 | End: 2021-09-11

## 2021-09-11 RX ORDER — METOPROLOL TARTRATE 5 MG/5ML
2.5 INJECTION INTRAVENOUS EVERY 6 HOURS
Status: DISCONTINUED | OUTPATIENT
Start: 2021-09-11 | End: 2021-09-14

## 2021-09-11 RX ORDER — BISACODYL 10 MG
10 SUPPOSITORY, RECTAL RECTAL DAILY
Status: DISCONTINUED | OUTPATIENT
Start: 2021-09-11 | End: 2021-09-24 | Stop reason: HOSPADM

## 2021-09-11 RX ADMIN — HYDRALAZINE HYDROCHLORIDE 10 MG: 20 INJECTION INTRAMUSCULAR; INTRAVENOUS at 16:33

## 2021-09-11 RX ADMIN — METOPROLOL TARTRATE 2.5 MG: 5 INJECTION INTRAVENOUS at 20:32

## 2021-09-11 RX ADMIN — LABETALOL HYDROCHLORIDE 20 MG: 5 INJECTION, SOLUTION INTRAVENOUS at 11:47

## 2021-09-11 RX ADMIN — SODIUM CHLORIDE, PRESERVATIVE FREE 10 ML: 5 INJECTION INTRAVENOUS at 11:52

## 2021-09-11 RX ADMIN — LORAZEPAM 0.25 MG: 2 INJECTION INTRAMUSCULAR; INTRAVENOUS at 17:51

## 2021-09-11 RX ADMIN — BISACODYL 10 MG: 10 SUPPOSITORY RECTAL at 11:55

## 2021-09-11 RX ADMIN — LABETALOL HYDROCHLORIDE 20 MG: 5 INJECTION, SOLUTION INTRAVENOUS at 17:54

## 2021-09-11 RX ADMIN — DEXAMETHASONE SODIUM PHOSPHATE 4 MG: 4 INJECTION, SOLUTION INTRA-ARTICULAR; INTRALESIONAL; INTRAMUSCULAR; INTRAVENOUS; SOFT TISSUE at 11:57

## 2021-09-11 RX ADMIN — METOPROLOL TARTRATE 2.5 MG: 5 INJECTION INTRAVENOUS at 14:33

## 2021-09-11 RX ADMIN — CHLORHEXIDINE GLUCONATE 0.12% ORAL RINSE 15 ML: 1.2 LIQUID ORAL at 11:55

## 2021-09-11 RX ADMIN — ALBUTEROL SULFATE 6 PUFF: 108 INHALANT RESPIRATORY (INHALATION) at 14:33

## 2021-09-11 RX ADMIN — CHLORHEXIDINE GLUCONATE 0.12% ORAL RINSE 15 ML: 1.2 LIQUID ORAL at 20:42

## 2021-09-11 RX ADMIN — MORPHINE SULFATE 2 MG: 2 INJECTION, SOLUTION INTRAMUSCULAR; INTRAVENOUS at 03:18

## 2021-09-11 RX ADMIN — LABETALOL HYDROCHLORIDE 20 MG: 5 INJECTION, SOLUTION INTRAVENOUS at 02:08

## 2021-09-11 RX ADMIN — ALBUTEROL SULFATE 6 PUFF: 108 INHALANT RESPIRATORY (INHALATION) at 08:20

## 2021-09-11 RX ADMIN — ALBUTEROL SULFATE 6 PUFF: 108 INHALANT RESPIRATORY (INHALATION) at 18:51

## 2021-09-11 RX ADMIN — FAMOTIDINE 20 MG: 10 INJECTION INTRAVENOUS at 08:20

## 2021-09-12 ENCOUNTER — APPOINTMENT (OUTPATIENT)
Dept: GENERAL RADIOLOGY | Facility: HOSPITAL | Age: 46
End: 2021-09-12

## 2021-09-12 LAB
ANION GAP SERPL CALCULATED.3IONS-SCNC: 24 MMOL/L (ref 5–15)
BUN SERPL-MCNC: 74 MG/DL (ref 6–20)
BUN/CREAT SERPL: 16.7 (ref 7–25)
CALCIUM SPEC-SCNC: 9.4 MG/DL (ref 8.6–10.5)
CHLORIDE SERPL-SCNC: 96 MMOL/L (ref 98–107)
CO2 SERPL-SCNC: 18 MMOL/L (ref 22–29)
CREAT SERPL-MCNC: 4.42 MG/DL (ref 0.57–1)
DEPRECATED RDW RBC AUTO: 53 FL (ref 37–54)
ERYTHROCYTE [DISTWIDTH] IN BLOOD BY AUTOMATED COUNT: 16.2 % (ref 12.3–15.4)
GFR SERPL CREATININE-BSD FRML MDRD: 11 ML/MIN/1.73
GFR SERPL CREATININE-BSD FRML MDRD: ABNORMAL ML/MIN/{1.73_M2}
GLUCOSE BLDC GLUCOMTR-MCNC: 111 MG/DL (ref 70–130)
GLUCOSE BLDC GLUCOMTR-MCNC: 121 MG/DL (ref 70–130)
GLUCOSE BLDC GLUCOMTR-MCNC: 125 MG/DL (ref 70–130)
GLUCOSE SERPL-MCNC: 100 MG/DL (ref 65–99)
HCT VFR BLD AUTO: 23.9 % (ref 34–46.6)
HGB BLD-MCNC: 8 G/DL (ref 12–15.9)
MCH RBC QN AUTO: 31.5 PG (ref 26.6–33)
MCHC RBC AUTO-ENTMCNC: 33.5 G/DL (ref 31.5–35.7)
MCV RBC AUTO: 94.1 FL (ref 79–97)
PLATELET # BLD AUTO: 297 10*3/MM3 (ref 140–450)
PMV BLD AUTO: 9.5 FL (ref 6–12)
POTASSIUM SERPL-SCNC: 3.9 MMOL/L (ref 3.5–5.2)
RBC # BLD AUTO: 2.54 10*6/MM3 (ref 3.77–5.28)
SODIUM SERPL-SCNC: 138 MMOL/L (ref 136–145)
WBC # BLD AUTO: 17.03 10*3/MM3 (ref 3.4–10.8)

## 2021-09-12 PROCEDURE — 94799 UNLISTED PULMONARY SVC/PX: CPT

## 2021-09-12 PROCEDURE — 25010000002 DEXAMETHASONE PER 1 MG: Performed by: NURSE PRACTITIONER

## 2021-09-12 PROCEDURE — 25010000002 HYDRALAZINE PER 20 MG: Performed by: INTERNAL MEDICINE

## 2021-09-12 PROCEDURE — 82962 GLUCOSE BLOOD TEST: CPT

## 2021-09-12 PROCEDURE — 74018 RADEX ABDOMEN 1 VIEW: CPT

## 2021-09-12 PROCEDURE — 85027 COMPLETE CBC AUTOMATED: CPT | Performed by: INTERNAL MEDICINE

## 2021-09-12 PROCEDURE — 80048 BASIC METABOLIC PNL TOTAL CA: CPT | Performed by: INTERNAL MEDICINE

## 2021-09-12 PROCEDURE — 99231 SBSQ HOSP IP/OBS SF/LOW 25: CPT | Performed by: INTERNAL MEDICINE

## 2021-09-12 RX ORDER — CLONIDINE 0.1 MG/24H
1 PATCH, EXTENDED RELEASE TRANSDERMAL WEEKLY
Status: DISCONTINUED | OUTPATIENT
Start: 2021-09-12 | End: 2021-09-24 | Stop reason: HOSPADM

## 2021-09-12 RX ORDER — DEXAMETHASONE SODIUM PHOSPHATE 4 MG/ML
2 INJECTION, SOLUTION INTRA-ARTICULAR; INTRALESIONAL; INTRAMUSCULAR; INTRAVENOUS; SOFT TISSUE DAILY
Status: COMPLETED | OUTPATIENT
Start: 2021-09-13 | End: 2021-09-14

## 2021-09-12 RX ADMIN — HYDRALAZINE HYDROCHLORIDE 10 MG: 20 INJECTION INTRAMUSCULAR; INTRAVENOUS at 01:31

## 2021-09-12 RX ADMIN — ALBUTEROL SULFATE 6 PUFF: 108 INHALANT RESPIRATORY (INHALATION) at 07:05

## 2021-09-12 RX ADMIN — LABETALOL HYDROCHLORIDE 20 MG: 5 INJECTION, SOLUTION INTRAVENOUS at 08:52

## 2021-09-12 RX ADMIN — METOPROLOL TARTRATE 2.5 MG: 5 INJECTION INTRAVENOUS at 16:34

## 2021-09-12 RX ADMIN — ALBUTEROL SULFATE 6 PUFF: 108 INHALANT RESPIRATORY (INHALATION) at 23:58

## 2021-09-12 RX ADMIN — METOPROLOL TARTRATE 2.5 MG: 5 INJECTION INTRAVENOUS at 09:00

## 2021-09-12 RX ADMIN — SODIUM CHLORIDE, POTASSIUM CHLORIDE, SODIUM LACTATE AND CALCIUM CHLORIDE 20 ML/HR: 600; 310; 30; 20 INJECTION, SOLUTION INTRAVENOUS at 02:43

## 2021-09-12 RX ADMIN — DOCUSATE SODIUM 50 MG AND SENNOSIDES 8.6 MG 1 TABLET: 8.6; 5 TABLET, FILM COATED ORAL at 22:45

## 2021-09-12 RX ADMIN — SODIUM CHLORIDE, PRESERVATIVE FREE 10 ML: 5 INJECTION INTRAVENOUS at 08:49

## 2021-09-12 RX ADMIN — CHLORHEXIDINE GLUCONATE 0.12% ORAL RINSE 15 ML: 1.2 LIQUID ORAL at 22:45

## 2021-09-12 RX ADMIN — CHLORHEXIDINE GLUCONATE 0.12% ORAL RINSE 15 ML: 1.2 LIQUID ORAL at 08:48

## 2021-09-12 RX ADMIN — LABETALOL HYDROCHLORIDE 20 MG: 5 INJECTION, SOLUTION INTRAVENOUS at 22:38

## 2021-09-12 RX ADMIN — BISACODYL 10 MG: 10 SUPPOSITORY RECTAL at 08:48

## 2021-09-12 RX ADMIN — DEXAMETHASONE SODIUM PHOSPHATE 4 MG: 4 INJECTION, SOLUTION INTRA-ARTICULAR; INTRALESIONAL; INTRAMUSCULAR; INTRAVENOUS; SOFT TISSUE at 08:59

## 2021-09-12 RX ADMIN — METOPROLOL TARTRATE 2.5 MG: 5 INJECTION INTRAVENOUS at 03:49

## 2021-09-12 RX ADMIN — SODIUM CHLORIDE, PRESERVATIVE FREE 10 ML: 5 INJECTION INTRAVENOUS at 22:46

## 2021-09-12 RX ADMIN — HYDRALAZINE HYDROCHLORIDE 10 MG: 20 INJECTION INTRAMUSCULAR; INTRAVENOUS at 12:29

## 2021-09-12 RX ADMIN — CLONIDINE 1 PATCH: 0.1 PATCH TRANSDERMAL at 12:29

## 2021-09-12 RX ADMIN — Medication 2 PACKET: at 22:44

## 2021-09-12 RX ADMIN — METOPROLOL TARTRATE 2.5 MG: 5 INJECTION INTRAVENOUS at 22:35

## 2021-09-13 ENCOUNTER — APPOINTMENT (OUTPATIENT)
Dept: GENERAL RADIOLOGY | Facility: HOSPITAL | Age: 46
End: 2021-09-13

## 2021-09-13 LAB
ANION GAP SERPL CALCULATED.3IONS-SCNC: 21 MMOL/L (ref 5–15)
ANISOCYTOSIS BLD QL: ABNORMAL
ARTERIAL PATENCY WRIST A: POSITIVE
ATMOSPHERIC PRESS: 754 MMHG
BASE EXCESS BLDA CALC-SCNC: -6 MMOL/L (ref 0–2)
BASOPHILS # BLD MANUAL: 0.31 10*3/MM3 (ref 0–0.2)
BASOPHILS NFR BLD AUTO: 2 % (ref 0–1.5)
BDY SITE: ABNORMAL
BODY TEMPERATURE: 37 C
BUN SERPL-MCNC: 94 MG/DL (ref 6–20)
BUN/CREAT SERPL: 17.8 (ref 7–25)
CALCIUM SPEC-SCNC: 9.3 MG/DL (ref 8.6–10.5)
CHLORIDE SERPL-SCNC: 98 MMOL/L (ref 98–107)
CLUMPED PLATELETS: PRESENT
CO2 SERPL-SCNC: 20 MMOL/L (ref 22–29)
CREAT SERPL-MCNC: 5.28 MG/DL (ref 0.57–1)
DEPRECATED RDW RBC AUTO: 56 FL (ref 37–54)
EOSINOPHIL # BLD MANUAL: 0.15 10*3/MM3 (ref 0–0.4)
EOSINOPHIL NFR BLD MANUAL: 1 % (ref 0.3–6.2)
ERYTHROCYTE [DISTWIDTH] IN BLOOD BY AUTOMATED COUNT: 16.8 % (ref 12.3–15.4)
GAS FLOW AIRWAY: 4 LPM
GFR SERPL CREATININE-BSD FRML MDRD: 9 ML/MIN/1.73
GFR SERPL CREATININE-BSD FRML MDRD: ABNORMAL ML/MIN/{1.73_M2}
GLUCOSE BLDC GLUCOMTR-MCNC: 103 MG/DL (ref 70–130)
GLUCOSE BLDC GLUCOMTR-MCNC: 114 MG/DL (ref 70–130)
GLUCOSE BLDC GLUCOMTR-MCNC: 122 MG/DL (ref 70–130)
GLUCOSE BLDC GLUCOMTR-MCNC: 91 MG/DL (ref 70–130)
GLUCOSE BLDC GLUCOMTR-MCNC: 99 MG/DL (ref 70–130)
GLUCOSE SERPL-MCNC: 106 MG/DL (ref 65–99)
HCO3 BLDA-SCNC: 20.7 MMOL/L (ref 20–26)
HCT VFR BLD AUTO: 24.5 % (ref 34–46.6)
HGB BLD-MCNC: 8.2 G/DL (ref 12–15.9)
LYMPHOCYTES # BLD MANUAL: 1.23 10*3/MM3 (ref 0.7–3.1)
LYMPHOCYTES NFR BLD MANUAL: 5 % (ref 5–12)
LYMPHOCYTES NFR BLD MANUAL: 8 % (ref 19.6–45.3)
Lab: ABNORMAL
MACROCYTES BLD QL SMEAR: ABNORMAL
MCH RBC QN AUTO: 31.9 PG (ref 26.6–33)
MCHC RBC AUTO-ENTMCNC: 33.5 G/DL (ref 31.5–35.7)
MCV RBC AUTO: 95.3 FL (ref 79–97)
METAMYELOCYTES NFR BLD MANUAL: 2 % (ref 0–0)
MODALITY: ABNORMAL
MONOCYTES # BLD AUTO: 0.77 10*3/MM3 (ref 0.1–0.9)
MYELOCYTES NFR BLD MANUAL: 1 % (ref 0–0)
NEUTROPHILS # BLD AUTO: 12.47 10*3/MM3 (ref 1.7–7)
NEUTROPHILS NFR BLD MANUAL: 81 % (ref 42.7–76)
PCO2 BLDA: 45.4 MM HG (ref 35–45)
PCO2 TEMP ADJ BLD: 45.4 MM HG (ref 35–45)
PH BLDA: 7.27 PH UNITS (ref 7.35–7.45)
PH, TEMP CORRECTED: 7.27 PH UNITS (ref 7.35–7.45)
PLATELET # BLD AUTO: 279 10*3/MM3 (ref 140–450)
PMV BLD AUTO: 9.5 FL (ref 6–12)
PO2 BLDA: 88.6 MM HG (ref 83–108)
PO2 TEMP ADJ BLD: 88.6 MM HG (ref 83–108)
POLYCHROMASIA BLD QL SMEAR: ABNORMAL
POTASSIUM SERPL-SCNC: 3.5 MMOL/L (ref 3.5–5.2)
RBC # BLD AUTO: 2.57 10*6/MM3 (ref 3.77–5.28)
SAO2 % BLDCOA: 95.6 % (ref 94–99)
SODIUM SERPL-SCNC: 139 MMOL/L (ref 136–145)
VENTILATOR MODE: ABNORMAL
WBC # BLD AUTO: 15.39 10*3/MM3 (ref 3.4–10.8)
WBC MORPH BLD: NORMAL

## 2021-09-13 PROCEDURE — 36600 WITHDRAWAL OF ARTERIAL BLOOD: CPT

## 2021-09-13 PROCEDURE — 25010000002 MORPHINE SULFATE (PF) 2 MG/ML SOLUTION: Performed by: INTERNAL MEDICINE

## 2021-09-13 PROCEDURE — 74018 RADEX ABDOMEN 1 VIEW: CPT

## 2021-09-13 PROCEDURE — 25010000002 DEXAMETHASONE PER 1 MG: Performed by: NURSE PRACTITIONER

## 2021-09-13 PROCEDURE — 82803 BLOOD GASES ANY COMBINATION: CPT

## 2021-09-13 PROCEDURE — 82962 GLUCOSE BLOOD TEST: CPT

## 2021-09-13 PROCEDURE — 97163 PT EVAL HIGH COMPLEX 45 MIN: CPT

## 2021-09-13 PROCEDURE — 85025 COMPLETE CBC W/AUTO DIFF WBC: CPT | Performed by: INTERNAL MEDICINE

## 2021-09-13 PROCEDURE — 25010000002 ALTEPLASE 2 MG RECONSTITUTED SOLUTION: Performed by: INTERNAL MEDICINE

## 2021-09-13 PROCEDURE — 97166 OT EVAL MOD COMPLEX 45 MIN: CPT

## 2021-09-13 PROCEDURE — 97535 SELF CARE MNGMENT TRAINING: CPT

## 2021-09-13 PROCEDURE — 94799 UNLISTED PULMONARY SVC/PX: CPT

## 2021-09-13 PROCEDURE — 85007 BL SMEAR W/DIFF WBC COUNT: CPT | Performed by: INTERNAL MEDICINE

## 2021-09-13 PROCEDURE — 71045 X-RAY EXAM CHEST 1 VIEW: CPT

## 2021-09-13 PROCEDURE — 80048 BASIC METABOLIC PNL TOTAL CA: CPT | Performed by: INTERNAL MEDICINE

## 2021-09-13 RX ORDER — NALOXONE HYDROCHLORIDE 0.4 MG/ML
0.2 INJECTION, SOLUTION INTRAMUSCULAR; INTRAVENOUS; SUBCUTANEOUS AS NEEDED
Status: DISCONTINUED | OUTPATIENT
Start: 2021-09-13 | End: 2021-09-24 | Stop reason: HOSPADM

## 2021-09-13 RX ORDER — NALOXONE HYDROCHLORIDE 0.4 MG/ML
INJECTION, SOLUTION INTRAMUSCULAR; INTRAVENOUS; SUBCUTANEOUS
Status: DISPENSED
Start: 2021-09-13 | End: 2021-09-13

## 2021-09-13 RX ADMIN — DEXAMETHASONE SODIUM PHOSPHATE 2 MG: 4 INJECTION, SOLUTION INTRA-ARTICULAR; INTRALESIONAL; INTRAMUSCULAR; INTRAVENOUS; SOFT TISSUE at 12:26

## 2021-09-13 RX ADMIN — DOCUSATE SODIUM 50 MG AND SENNOSIDES 8.6 MG 1 TABLET: 8.6; 5 TABLET, FILM COATED ORAL at 21:03

## 2021-09-13 RX ADMIN — METOPROLOL TARTRATE 2.5 MG: 5 INJECTION INTRAVENOUS at 04:34

## 2021-09-13 RX ADMIN — SODIUM CHLORIDE, PRESERVATIVE FREE 10 ML: 5 INJECTION INTRAVENOUS at 21:03

## 2021-09-13 RX ADMIN — MORPHINE SULFATE 1 MG: 2 INJECTION, SOLUTION INTRAMUSCULAR; INTRAVENOUS at 00:16

## 2021-09-13 RX ADMIN — ZINC SULFATE 220 MG (50 MG) CAPSULE 220 MG: CAPSULE at 12:26

## 2021-09-13 RX ADMIN — ALTEPLASE: 2.2 INJECTION, POWDER, LYOPHILIZED, FOR SOLUTION INTRAVENOUS at 16:40

## 2021-09-13 RX ADMIN — ALBUTEROL SULFATE 6 PUFF: 108 INHALANT RESPIRATORY (INHALATION) at 07:25

## 2021-09-13 RX ADMIN — METOPROLOL TARTRATE 2.5 MG: 5 INJECTION INTRAVENOUS at 12:26

## 2021-09-13 RX ADMIN — OXYCODONE HYDROCHLORIDE AND ACETAMINOPHEN 500 MG: 500 TABLET ORAL at 12:25

## 2021-09-13 RX ADMIN — Medication 2 PACKET: at 21:02

## 2021-09-13 RX ADMIN — METOPROLOL TARTRATE 2.5 MG: 5 INJECTION INTRAVENOUS at 21:03

## 2021-09-13 RX ADMIN — CHLORHEXIDINE GLUCONATE 0.12% ORAL RINSE 15 ML: 1.2 LIQUID ORAL at 21:03

## 2021-09-13 RX ADMIN — Medication 1000 UNITS: at 12:25

## 2021-09-13 RX ADMIN — ALBUTEROL SULFATE 6 PUFF: 108 INHALANT RESPIRATORY (INHALATION) at 14:23

## 2021-09-13 RX ADMIN — ALBUTEROL SULFATE 6 PUFF: 108 INHALANT RESPIRATORY (INHALATION) at 20:13

## 2021-09-13 RX ADMIN — DOCUSATE SODIUM 50 MG AND SENNOSIDES 8.6 MG 1 TABLET: 8.6; 5 TABLET, FILM COATED ORAL at 12:25

## 2021-09-13 RX ADMIN — SODIUM CHLORIDE, PRESERVATIVE FREE 10 ML: 5 INJECTION INTRAVENOUS at 12:28

## 2021-09-13 RX ADMIN — LABETALOL HYDROCHLORIDE 20 MG: 5 INJECTION, SOLUTION INTRAVENOUS at 23:38

## 2021-09-14 ENCOUNTER — APPOINTMENT (OUTPATIENT)
Dept: GENERAL RADIOLOGY | Facility: HOSPITAL | Age: 46
End: 2021-09-14

## 2021-09-14 LAB
ALBUMIN SERPL-MCNC: 3.1 G/DL (ref 3.5–5.2)
ALBUMIN/GLOB SERPL: 1 G/DL
ALP SERPL-CCNC: 83 U/L (ref 39–117)
ALT SERPL W P-5'-P-CCNC: 17 U/L (ref 1–33)
ANION GAP SERPL CALCULATED.3IONS-SCNC: 25 MMOL/L (ref 5–15)
ANISOCYTOSIS BLD QL: ABNORMAL
AST SERPL-CCNC: 22 U/L (ref 1–32)
BASOPHILS # BLD MANUAL: 0.4 10*3/MM3 (ref 0–0.2)
BASOPHILS NFR BLD AUTO: 3 % (ref 0–1.5)
BILIRUB SERPL-MCNC: 1.5 MG/DL (ref 0–1.2)
BUN SERPL-MCNC: 117 MG/DL (ref 6–20)
BUN/CREAT SERPL: 19.6 (ref 7–25)
BURR CELLS BLD QL SMEAR: ABNORMAL
CALCIUM SPEC-SCNC: 8.9 MG/DL (ref 8.6–10.5)
CHLORIDE SERPL-SCNC: 96 MMOL/L (ref 98–107)
CO2 SERPL-SCNC: 18 MMOL/L (ref 22–29)
CREAT SERPL-MCNC: 5.98 MG/DL (ref 0.57–1)
DEPRECATED RDW RBC AUTO: 58.2 FL (ref 37–54)
EOSINOPHIL # BLD MANUAL: 0.27 10*3/MM3 (ref 0–0.4)
EOSINOPHIL NFR BLD MANUAL: 2 % (ref 0.3–6.2)
ERYTHROCYTE [DISTWIDTH] IN BLOOD BY AUTOMATED COUNT: 16.8 % (ref 12.3–15.4)
GFR SERPL CREATININE-BSD FRML MDRD: 8 ML/MIN/1.73
GFR SERPL CREATININE-BSD FRML MDRD: ABNORMAL ML/MIN/{1.73_M2}
GLOBULIN UR ELPH-MCNC: 3 GM/DL
GLUCOSE BLDC GLUCOMTR-MCNC: 87 MG/DL (ref 70–130)
GLUCOSE BLDC GLUCOMTR-MCNC: 89 MG/DL (ref 70–130)
GLUCOSE BLDC GLUCOMTR-MCNC: 91 MG/DL (ref 70–130)
GLUCOSE BLDC GLUCOMTR-MCNC: 98 MG/DL (ref 70–130)
GLUCOSE SERPL-MCNC: 77 MG/DL (ref 65–99)
HCT VFR BLD AUTO: 24.1 % (ref 34–46.6)
HGB BLD-MCNC: 7.8 G/DL (ref 12–15.9)
LYMPHOCYTES # BLD MANUAL: 1.22 10*3/MM3 (ref 0.7–3.1)
LYMPHOCYTES NFR BLD MANUAL: 3 % (ref 5–12)
LYMPHOCYTES NFR BLD MANUAL: 9.1 % (ref 19.6–45.3)
MAGNESIUM SERPL-MCNC: 2.7 MG/DL (ref 1.6–2.6)
MCH RBC QN AUTO: 31.5 PG (ref 26.6–33)
MCHC RBC AUTO-ENTMCNC: 32.4 G/DL (ref 31.5–35.7)
MCV RBC AUTO: 97.2 FL (ref 79–97)
MONOCYTES # BLD AUTO: 0.4 10*3/MM3 (ref 0.1–0.9)
MYELOCYTES NFR BLD MANUAL: 4 % (ref 0–0)
NEUTROPHILS # BLD AUTO: 10.45 10*3/MM3 (ref 1.7–7)
NEUTROPHILS NFR BLD MANUAL: 77.8 % (ref 42.7–76)
NRBC SPEC MANUAL: 1 /100 WBC (ref 0–0.2)
PLAT MORPH BLD: NORMAL
PLATELET # BLD AUTO: 254 10*3/MM3 (ref 140–450)
PMV BLD AUTO: 9.9 FL (ref 6–12)
POIKILOCYTOSIS BLD QL SMEAR: ABNORMAL
POLYCHROMASIA BLD QL SMEAR: ABNORMAL
POTASSIUM SERPL-SCNC: 3 MMOL/L (ref 3.5–5.2)
PROMYELOCYTES NFR BLD MANUAL: 1 % (ref 0–0)
PROT SERPL-MCNC: 6.1 G/DL (ref 6–8.5)
RBC # BLD AUTO: 2.48 10*6/MM3 (ref 3.77–5.28)
SODIUM SERPL-SCNC: 139 MMOL/L (ref 136–145)
WBC # BLD AUTO: 13.43 10*3/MM3 (ref 3.4–10.8)
WBC MORPH BLD: NORMAL

## 2021-09-14 PROCEDURE — 83735 ASSAY OF MAGNESIUM: CPT | Performed by: INTERNAL MEDICINE

## 2021-09-14 PROCEDURE — 97110 THERAPEUTIC EXERCISES: CPT

## 2021-09-14 PROCEDURE — 74018 RADEX ABDOMEN 1 VIEW: CPT

## 2021-09-14 PROCEDURE — 85007 BL SMEAR W/DIFF WBC COUNT: CPT | Performed by: INTERNAL MEDICINE

## 2021-09-14 PROCEDURE — 94799 UNLISTED PULMONARY SVC/PX: CPT

## 2021-09-14 PROCEDURE — 97530 THERAPEUTIC ACTIVITIES: CPT

## 2021-09-14 PROCEDURE — 25010000002 NEOSTIGMINE 10 MG/10ML SOLUTION 10 ML VIAL: Performed by: INTERNAL MEDICINE

## 2021-09-14 PROCEDURE — 25010000003 POTASSIUM CHLORIDE 10 MEQ/50ML SOLUTION: Performed by: INTERNAL MEDICINE

## 2021-09-14 PROCEDURE — 25010000002 ALTEPLASE 2 MG RECONSTITUTED SOLUTION 1 EACH VIAL: Performed by: INTERNAL MEDICINE

## 2021-09-14 PROCEDURE — 25010000002 MORPHINE SULFATE (PF) 2 MG/ML SOLUTION: Performed by: INTERNAL MEDICINE

## 2021-09-14 PROCEDURE — 82962 GLUCOSE BLOOD TEST: CPT

## 2021-09-14 PROCEDURE — 80053 COMPREHEN METABOLIC PANEL: CPT | Performed by: INTERNAL MEDICINE

## 2021-09-14 PROCEDURE — 85025 COMPLETE CBC W/AUTO DIFF WBC: CPT | Performed by: INTERNAL MEDICINE

## 2021-09-14 PROCEDURE — 97535 SELF CARE MNGMENT TRAINING: CPT

## 2021-09-14 PROCEDURE — 99232 SBSQ HOSP IP/OBS MODERATE 35: CPT | Performed by: SURGERY

## 2021-09-14 PROCEDURE — 25010000002 DEXAMETHASONE PER 1 MG: Performed by: NURSE PRACTITIONER

## 2021-09-14 PROCEDURE — 99222 1ST HOSP IP/OBS MODERATE 55: CPT | Performed by: INTERNAL MEDICINE

## 2021-09-14 RX ORDER — POTASSIUM CHLORIDE 14.9 MG/ML
10 INJECTION INTRAVENOUS
Status: COMPLETED | OUTPATIENT
Start: 2021-09-14 | End: 2021-09-14

## 2021-09-14 RX ORDER — GLYCOPYRROLATE 0.2 MG/ML
0.2 INJECTION INTRAMUSCULAR; INTRAVENOUS ONCE
Status: COMPLETED | OUTPATIENT
Start: 2021-09-14 | End: 2021-09-14

## 2021-09-14 RX ADMIN — Medication 2 PACKET: at 08:56

## 2021-09-14 RX ADMIN — METOPROLOL TARTRATE 2.5 MG: 5 INJECTION INTRAVENOUS at 04:18

## 2021-09-14 RX ADMIN — BISACODYL 10 MG: 10 SUPPOSITORY RECTAL at 08:57

## 2021-09-14 RX ADMIN — POTASSIUM CHLORIDE 10 MEQ: 14.9 INJECTION, SOLUTION INTRAVENOUS at 14:51

## 2021-09-14 RX ADMIN — DOCUSATE SODIUM 50 MG AND SENNOSIDES 8.6 MG 1 TABLET: 8.6; 5 TABLET, FILM COATED ORAL at 08:57

## 2021-09-14 RX ADMIN — Medication 1000 UNITS: at 08:56

## 2021-09-14 RX ADMIN — METOPROLOL TARTRATE 2.5 MG: 5 INJECTION INTRAVENOUS at 08:58

## 2021-09-14 RX ADMIN — OXYCODONE HYDROCHLORIDE AND ACETAMINOPHEN 500 MG: 500 TABLET ORAL at 08:57

## 2021-09-14 RX ADMIN — CHLORHEXIDINE GLUCONATE 0.12% ORAL RINSE 15 ML: 1.2 LIQUID ORAL at 23:01

## 2021-09-14 RX ADMIN — ALBUTEROL SULFATE 6 PUFF: 108 INHALANT RESPIRATORY (INHALATION) at 11:45

## 2021-09-14 RX ADMIN — Medication 2 PACKET: at 21:53

## 2021-09-14 RX ADMIN — POTASSIUM CHLORIDE 10 MEQ: 14.9 INJECTION, SOLUTION INTRAVENOUS at 12:36

## 2021-09-14 RX ADMIN — CHLORHEXIDINE GLUCONATE 0.12% ORAL RINSE 15 ML: 1.2 LIQUID ORAL at 08:57

## 2021-09-14 RX ADMIN — METOPROLOL TARTRATE 2.5 MG: 5 INJECTION INTRAVENOUS at 14:50

## 2021-09-14 RX ADMIN — DEXAMETHASONE SODIUM PHOSPHATE 2 MG: 4 INJECTION, SOLUTION INTRA-ARTICULAR; INTRALESIONAL; INTRAMUSCULAR; INTRAVENOUS; SOFT TISSUE at 08:56

## 2021-09-14 RX ADMIN — SODIUM CHLORIDE, PRESERVATIVE FREE 10 ML: 5 INJECTION INTRAVENOUS at 21:53

## 2021-09-14 RX ADMIN — WATER: 1 INJECTION INTRAMUSCULAR; INTRAVENOUS; SUBCUTANEOUS at 14:20

## 2021-09-14 RX ADMIN — WATER: 1 INJECTION INTRAMUSCULAR; INTRAVENOUS; SUBCUTANEOUS at 14:21

## 2021-09-14 RX ADMIN — ZINC SULFATE 220 MG (50 MG) CAPSULE 220 MG: CAPSULE at 08:57

## 2021-09-14 RX ADMIN — SODIUM CHLORIDE, PRESERVATIVE FREE 10 ML: 5 INJECTION INTRAVENOUS at 12:36

## 2021-09-14 RX ADMIN — NEOSTIGMINE METHYLSULFATE 0.4 MG/HR: 1 INJECTION INTRAVENOUS at 17:43

## 2021-09-14 RX ADMIN — DOCUSATE SODIUM 50 MG AND SENNOSIDES 8.6 MG 1 TABLET: 8.6; 5 TABLET, FILM COATED ORAL at 21:52

## 2021-09-14 RX ADMIN — MORPHINE SULFATE 1 MG: 2 INJECTION, SOLUTION INTRAMUSCULAR; INTRAVENOUS at 11:40

## 2021-09-14 RX ADMIN — ALBUTEROL SULFATE 6 PUFF: 108 INHALANT RESPIRATORY (INHALATION) at 01:22

## 2021-09-14 RX ADMIN — GLYCOPYRROLATE 0.2 MG: 0.2 INJECTION INTRAMUSCULAR; INTRAVENOUS at 17:42

## 2021-09-15 ENCOUNTER — APPOINTMENT (OUTPATIENT)
Dept: GENERAL RADIOLOGY | Facility: HOSPITAL | Age: 46
End: 2021-09-15

## 2021-09-15 LAB
ABO GROUP BLD: NORMAL
ANION GAP SERPL CALCULATED.3IONS-SCNC: 20 MMOL/L (ref 5–15)
ANION GAP SERPL CALCULATED.3IONS-SCNC: 21 MMOL/L (ref 5–15)
ANISOCYTOSIS BLD QL: ABNORMAL
BASOPHILS # BLD MANUAL: 0.16 10*3/MM3 (ref 0–0.2)
BASOPHILS NFR BLD AUTO: 1 % (ref 0–1.5)
BLD GP AB SCN SERPL QL: NEGATIVE
BUN SERPL-MCNC: 84 MG/DL (ref 6–20)
BUN SERPL-MCNC: 93 MG/DL (ref 6–20)
BUN/CREAT SERPL: 18.1 (ref 7–25)
BUN/CREAT SERPL: 18.1 (ref 7–25)
BURR CELLS BLD QL SMEAR: ABNORMAL
CALCIUM SPEC-SCNC: 8.9 MG/DL (ref 8.6–10.5)
CALCIUM SPEC-SCNC: 9 MG/DL (ref 8.6–10.5)
CHLORIDE SERPL-SCNC: 101 MMOL/L (ref 98–107)
CHLORIDE SERPL-SCNC: 97 MMOL/L (ref 98–107)
CO2 SERPL-SCNC: 18 MMOL/L (ref 22–29)
CO2 SERPL-SCNC: 19 MMOL/L (ref 22–29)
CREAT SERPL-MCNC: 4.65 MG/DL (ref 0.57–1)
CREAT SERPL-MCNC: 5.13 MG/DL (ref 0.57–1)
D-LACTATE SERPL-SCNC: 0.7 MMOL/L (ref 0.5–2)
DEPRECATED RDW RBC AUTO: 58.1 FL (ref 37–54)
EOSINOPHIL # BLD MANUAL: 0.8 10*3/MM3 (ref 0–0.4)
EOSINOPHIL NFR BLD MANUAL: 5 % (ref 0.3–6.2)
ERYTHROCYTE [DISTWIDTH] IN BLOOD BY AUTOMATED COUNT: 16.8 % (ref 12.3–15.4)
GFR SERPL CREATININE-BSD FRML MDRD: 10 ML/MIN/1.73
GFR SERPL CREATININE-BSD FRML MDRD: 9 ML/MIN/1.73
GFR SERPL CREATININE-BSD FRML MDRD: ABNORMAL ML/MIN/{1.73_M2}
GFR SERPL CREATININE-BSD FRML MDRD: ABNORMAL ML/MIN/{1.73_M2}
GLUCOSE BLDC GLUCOMTR-MCNC: 83 MG/DL (ref 70–130)
GLUCOSE BLDC GLUCOMTR-MCNC: 84 MG/DL (ref 70–130)
GLUCOSE BLDC GLUCOMTR-MCNC: 87 MG/DL (ref 70–130)
GLUCOSE SERPL-MCNC: 79 MG/DL (ref 65–99)
GLUCOSE SERPL-MCNC: 82 MG/DL (ref 65–99)
HCT VFR BLD AUTO: 25 % (ref 34–46.6)
HGB BLD-MCNC: 8.1 G/DL (ref 12–15.9)
LYMPHOCYTES # BLD MANUAL: 0.8 10*3/MM3 (ref 0.7–3.1)
LYMPHOCYTES NFR BLD MANUAL: 2 % (ref 5–12)
LYMPHOCYTES NFR BLD MANUAL: 5 % (ref 19.6–45.3)
MAGNESIUM SERPL-MCNC: 2.5 MG/DL (ref 1.6–2.6)
MCH RBC QN AUTO: 31.4 PG (ref 26.6–33)
MCHC RBC AUTO-ENTMCNC: 32.4 G/DL (ref 31.5–35.7)
MCV RBC AUTO: 96.9 FL (ref 79–97)
MONOCYTES # BLD AUTO: 0.32 10*3/MM3 (ref 0.1–0.9)
MYELOCYTES NFR BLD MANUAL: 2 % (ref 0–0)
NEUTROPHILS # BLD AUTO: 13.4 10*3/MM3 (ref 1.7–7)
NEUTROPHILS NFR BLD MANUAL: 82 % (ref 42.7–76)
NEUTS BAND NFR BLD MANUAL: 2 % (ref 0–5)
PLAT MORPH BLD: NORMAL
PLATELET # BLD AUTO: 240 10*3/MM3 (ref 140–450)
PMV BLD AUTO: 10.1 FL (ref 6–12)
POIKILOCYTOSIS BLD QL SMEAR: ABNORMAL
POLYCHROMASIA BLD QL SMEAR: ABNORMAL
POTASSIUM SERPL-SCNC: 2.8 MMOL/L (ref 3.5–5.2)
POTASSIUM SERPL-SCNC: 3 MMOL/L (ref 3.5–5.2)
PROMYELOCYTES NFR BLD MANUAL: 1 % (ref 0–0)
RBC # BLD AUTO: 2.58 10*6/MM3 (ref 3.77–5.28)
RH BLD: POSITIVE
SARS-COV-2 RNA PNL SPEC NAA+PROBE: NOT DETECTED
SODIUM SERPL-SCNC: 137 MMOL/L (ref 136–145)
SODIUM SERPL-SCNC: 139 MMOL/L (ref 136–145)
T&S EXPIRATION DATE: NORMAL
WBC # BLD AUTO: 15.95 10*3/MM3 (ref 3.4–10.8)
WBC MORPH BLD: NORMAL

## 2021-09-15 PROCEDURE — 25010000002 POTASSIUM CHLORIDE PER 2 MEQ: Performed by: NURSE PRACTITIONER

## 2021-09-15 PROCEDURE — 25010000002 EPOETIN ALFA-EPBX 10000 UNIT/ML SOLUTION: Performed by: INTERNAL MEDICINE

## 2021-09-15 PROCEDURE — 86850 RBC ANTIBODY SCREEN: CPT | Performed by: SURGERY

## 2021-09-15 PROCEDURE — 82962 GLUCOSE BLOOD TEST: CPT

## 2021-09-15 PROCEDURE — 99232 SBSQ HOSP IP/OBS MODERATE 35: CPT | Performed by: SURGERY

## 2021-09-15 PROCEDURE — 25010000002 MORPHINE SULFATE (PF) 2 MG/ML SOLUTION: Performed by: INTERNAL MEDICINE

## 2021-09-15 PROCEDURE — 94799 UNLISTED PULMONARY SVC/PX: CPT

## 2021-09-15 PROCEDURE — 85007 BL SMEAR W/DIFF WBC COUNT: CPT | Performed by: INTERNAL MEDICINE

## 2021-09-15 PROCEDURE — 25010000003 POTASSIUM CHLORIDE PER 2 MEQ: Performed by: INTERNAL MEDICINE

## 2021-09-15 PROCEDURE — 86901 BLOOD TYPING SEROLOGIC RH(D): CPT | Performed by: SURGERY

## 2021-09-15 PROCEDURE — 25010000002 LORAZEPAM PER 2 MG: Performed by: INTERNAL MEDICINE

## 2021-09-15 PROCEDURE — 74018 RADEX ABDOMEN 1 VIEW: CPT

## 2021-09-15 PROCEDURE — 25010000002 NEOSTIGMINE 10 MG/10ML SOLUTION 10 ML VIAL: Performed by: INTERNAL MEDICINE

## 2021-09-15 PROCEDURE — 25010000002 HYDRALAZINE PER 20 MG: Performed by: INTERNAL MEDICINE

## 2021-09-15 PROCEDURE — 83735 ASSAY OF MAGNESIUM: CPT | Performed by: INTERNAL MEDICINE

## 2021-09-15 PROCEDURE — 80048 BASIC METABOLIC PNL TOTAL CA: CPT | Performed by: INTERNAL MEDICINE

## 2021-09-15 PROCEDURE — 80048 BASIC METABOLIC PNL TOTAL CA: CPT | Performed by: NURSE PRACTITIONER

## 2021-09-15 PROCEDURE — 83605 ASSAY OF LACTIC ACID: CPT | Performed by: SPECIALIST

## 2021-09-15 PROCEDURE — 85025 COMPLETE CBC W/AUTO DIFF WBC: CPT | Performed by: INTERNAL MEDICINE

## 2021-09-15 PROCEDURE — 86900 BLOOD TYPING SEROLOGIC ABO: CPT | Performed by: SURGERY

## 2021-09-15 PROCEDURE — 87635 SARS-COV-2 COVID-19 AMP PRB: CPT | Performed by: SURGERY

## 2021-09-15 RX ORDER — POTASSIUM CHLORIDE 29.8 MG/ML
20 INJECTION INTRAVENOUS
Status: DISCONTINUED | OUTPATIENT
Start: 2021-09-15 | End: 2021-09-17

## 2021-09-15 RX ORDER — POTASSIUM CHLORIDE 14.9 MG/ML
20 INJECTION INTRAVENOUS
Status: COMPLETED | OUTPATIENT
Start: 2021-09-15 | End: 2021-09-15

## 2021-09-15 RX ADMIN — MORPHINE SULFATE 1 MG: 2 INJECTION, SOLUTION INTRAMUSCULAR; INTRAVENOUS at 20:38

## 2021-09-15 RX ADMIN — DOCUSATE SODIUM 50 MG AND SENNOSIDES 8.6 MG 1 TABLET: 8.6; 5 TABLET, FILM COATED ORAL at 20:28

## 2021-09-15 RX ADMIN — HYDRALAZINE HYDROCHLORIDE 10 MG: 20 INJECTION INTRAMUSCULAR; INTRAVENOUS at 03:11

## 2021-09-15 RX ADMIN — NEOSTIGMINE METHYLSULFATE 0.4 MG/HR: 1 INJECTION INTRAVENOUS at 03:33

## 2021-09-15 RX ADMIN — EPOETIN ALFA-EPBX 10000 UNITS: 10000 INJECTION, SOLUTION INTRAVENOUS; SUBCUTANEOUS at 09:22

## 2021-09-15 RX ADMIN — HYDRALAZINE HYDROCHLORIDE 10 MG: 20 INJECTION INTRAMUSCULAR; INTRAVENOUS at 11:26

## 2021-09-15 RX ADMIN — POTASSIUM CHLORIDE 20 MEQ: 29.8 INJECTION, SOLUTION INTRAVENOUS at 21:52

## 2021-09-15 RX ADMIN — HYDRALAZINE HYDROCHLORIDE 10 MG: 20 INJECTION INTRAMUSCULAR; INTRAVENOUS at 23:03

## 2021-09-15 RX ADMIN — POTASSIUM CHLORIDE 20 MEQ: 200 INJECTION, SOLUTION INTRAVENOUS at 10:19

## 2021-09-15 RX ADMIN — I.V. FAT EMULSION 50 G: 20 EMULSION INTRAVENOUS at 18:41

## 2021-09-15 RX ADMIN — LORAZEPAM 0.25 MG: 2 INJECTION INTRAMUSCULAR; INTRAVENOUS at 23:53

## 2021-09-15 RX ADMIN — POTASSIUM CHLORIDE 20 MEQ: 29.8 INJECTION, SOLUTION INTRAVENOUS at 20:28

## 2021-09-15 RX ADMIN — POTASSIUM CHLORIDE 20 MEQ: 29.8 INJECTION, SOLUTION INTRAVENOUS at 22:56

## 2021-09-15 RX ADMIN — CHLORHEXIDINE GLUCONATE 0.12% ORAL RINSE 15 ML: 1.2 LIQUID ORAL at 20:28

## 2021-09-15 RX ADMIN — POTASSIUM CHLORIDE 20 MEQ: 200 INJECTION, SOLUTION INTRAVENOUS at 09:20

## 2021-09-15 RX ADMIN — SODIUM CHLORIDE, PRESERVATIVE FREE 10 ML: 5 INJECTION INTRAVENOUS at 20:28

## 2021-09-15 RX ADMIN — BISACODYL 10 MG: 10 SUPPOSITORY RECTAL at 10:31

## 2021-09-15 RX ADMIN — LEUCINE, PHENYLALANINE, LYSINE, METHIONINE, ISOLEUCINE, VALINE, HISTIDINE, THREONINE, TRYPTOPHAN, ALANINE, GLYCINE, ARGININE, PROLINE, SERINE, TYROSINE, SODIUM ACETATE, DIBASIC POTASSIUM PHOSPHATE, MAGNESIUM CHLORIDE, SODIUM CHLORIDE, CALCIUM CHLORIDE, DEXTROSE
365; 280; 290; 200; 300; 290; 240; 210; 90; 1035; 515; 575; 340; 250; 20; 340; 261; 51; 59; 33; 20 INJECTION INTRAVENOUS at 18:41

## 2021-09-16 ENCOUNTER — APPOINTMENT (OUTPATIENT)
Dept: INTERVENTIONAL RADIOLOGY/VASCULAR | Facility: HOSPITAL | Age: 46
End: 2021-09-16

## 2021-09-16 ENCOUNTER — ANESTHESIA (OUTPATIENT)
Dept: PERIOP | Facility: HOSPITAL | Age: 46
End: 2021-09-16

## 2021-09-16 ENCOUNTER — APPOINTMENT (OUTPATIENT)
Dept: GENERAL RADIOLOGY | Facility: HOSPITAL | Age: 46
End: 2021-09-16

## 2021-09-16 ENCOUNTER — ANESTHESIA EVENT (OUTPATIENT)
Dept: PERIOP | Facility: HOSPITAL | Age: 46
End: 2021-09-16

## 2021-09-16 LAB
ALBUMIN SERPL-MCNC: 3.1 G/DL (ref 3.5–5.2)
ALBUMIN/GLOB SERPL: 1.1 G/DL
ALP SERPL-CCNC: 75 U/L (ref 39–117)
ALT SERPL W P-5'-P-CCNC: 17 U/L (ref 1–33)
ANION GAP SERPL CALCULATED.3IONS-SCNC: 21 MMOL/L (ref 5–15)
ANISOCYTOSIS BLD QL: ABNORMAL
AST SERPL-CCNC: 23 U/L (ref 1–32)
BASOPHILS # BLD MANUAL: 0.16 10*3/MM3 (ref 0–0.2)
BASOPHILS NFR BLD AUTO: 1 % (ref 0–1.5)
BILIRUB SERPL-MCNC: 1.1 MG/DL (ref 0–1.2)
BUN SERPL-MCNC: 100 MG/DL (ref 6–20)
BUN/CREAT SERPL: 18.2 (ref 7–25)
CA-I BLD-MCNC: 4.71 MG/DL (ref 4.6–5.4)
CALCIUM SPEC-SCNC: 9.1 MG/DL (ref 8.6–10.5)
CHLORIDE SERPL-SCNC: 101 MMOL/L (ref 98–107)
CHOLEST SERPL-MCNC: 347 MG/DL (ref 0–200)
CO2 SERPL-SCNC: 18 MMOL/L (ref 22–29)
CREAT SERPL-MCNC: 5.5 MG/DL (ref 0.57–1)
CRP SERPL-MCNC: 5.54 MG/DL (ref 0–0.5)
DEPRECATED RDW RBC AUTO: 59.7 FL (ref 37–54)
EOSINOPHIL # BLD MANUAL: 0.95 10*3/MM3 (ref 0–0.4)
EOSINOPHIL NFR BLD MANUAL: 6.1 % (ref 0.3–6.2)
ERYTHROCYTE [DISTWIDTH] IN BLOOD BY AUTOMATED COUNT: 17.1 % (ref 12.3–15.4)
GFR SERPL CREATININE-BSD FRML MDRD: 8 ML/MIN/1.73
GFR SERPL CREATININE-BSD FRML MDRD: ABNORMAL ML/MIN/{1.73_M2}
GIANT PLATELETS: ABNORMAL
GLOBULIN UR ELPH-MCNC: 2.8 GM/DL
GLUCOSE BLDC GLUCOMTR-MCNC: 110 MG/DL (ref 70–130)
GLUCOSE BLDC GLUCOMTR-MCNC: 132 MG/DL (ref 70–130)
GLUCOSE BLDC GLUCOMTR-MCNC: 143 MG/DL (ref 70–130)
GLUCOSE BLDC GLUCOMTR-MCNC: 147 MG/DL (ref 70–130)
GLUCOSE SERPL-MCNC: 109 MG/DL (ref 65–99)
HCG SERPL QL: NEGATIVE
HCT VFR BLD AUTO: 23.4 % (ref 34–46.6)
HGB BLD-MCNC: 7.7 G/DL (ref 12–15.9)
LYMPHOCYTES # BLD MANUAL: 0.95 10*3/MM3 (ref 0.7–3.1)
LYMPHOCYTES NFR BLD MANUAL: 5.1 % (ref 5–12)
LYMPHOCYTES NFR BLD MANUAL: 6.1 % (ref 19.6–45.3)
Lab: NORMAL
MAGNESIUM SERPL-MCNC: 2.6 MG/DL (ref 1.6–2.6)
MCH RBC QN AUTO: 32.2 PG (ref 26.6–33)
MCHC RBC AUTO-ENTMCNC: 32.9 G/DL (ref 31.5–35.7)
MCV RBC AUTO: 97.9 FL (ref 79–97)
METAMYELOCYTES NFR BLD MANUAL: 2 % (ref 0–0)
MONOCYTES # BLD AUTO: 0.79 10*3/MM3 (ref 0.1–0.9)
MYELOCYTES NFR BLD MANUAL: 1 % (ref 0–0)
NEUTROPHILS # BLD AUTO: 12.25 10*3/MM3 (ref 1.7–7)
NEUTROPHILS NFR BLD MANUAL: 77.8 % (ref 42.7–76)
NEUTS BAND NFR BLD MANUAL: 1 % (ref 0–5)
PHOSPHATE SERPL-MCNC: 5.7 MG/DL (ref 2.5–4.5)
PLATELET # BLD AUTO: 216 10*3/MM3 (ref 140–450)
PMV BLD AUTO: 10.1 FL (ref 6–12)
POIKILOCYTOSIS BLD QL SMEAR: ABNORMAL
POLYCHROMASIA BLD QL SMEAR: ABNORMAL
POTASSIUM SERPL-SCNC: 3.4 MMOL/L (ref 3.5–5.2)
PREALB SERPL-MCNC: 25.4 MG/DL (ref 20–40)
PROT SERPL-MCNC: 5.9 G/DL (ref 6–8.5)
RBC # BLD AUTO: 2.39 10*6/MM3 (ref 3.77–5.28)
SODIUM SERPL-SCNC: 140 MMOL/L (ref 136–145)
TRIGL SERPL-MCNC: 375 MG/DL (ref 0–150)
WBC # BLD AUTO: 15.55 10*3/MM3 (ref 3.4–10.8)
WBC MORPH BLD: NORMAL

## 2021-09-16 PROCEDURE — 76000 FLUOROSCOPY <1 HR PHYS/QHP: CPT

## 2021-09-16 PROCEDURE — 02HV33Z INSERTION OF INFUSION DEVICE INTO SUPERIOR VENA CAVA, PERCUTANEOUS APPROACH: ICD-10-PCS | Performed by: SURGERY

## 2021-09-16 PROCEDURE — 71045 X-RAY EXAM CHEST 1 VIEW: CPT

## 2021-09-16 PROCEDURE — 25010000002 NEOSTIGMINE 10 MG/10ML SOLUTION 10 ML VIAL: Performed by: INTERNAL MEDICINE

## 2021-09-16 PROCEDURE — 25010000002 PROPOFOL 10 MG/ML EMULSION: Performed by: NURSE ANESTHETIST, CERTIFIED REGISTERED

## 2021-09-16 PROCEDURE — 80053 COMPREHEN METABOLIC PANEL: CPT | Performed by: NURSE PRACTITIONER

## 2021-09-16 PROCEDURE — 85025 COMPLETE CBC W/AUTO DIFF WBC: CPT | Performed by: INTERNAL MEDICINE

## 2021-09-16 PROCEDURE — 82330 ASSAY OF CALCIUM: CPT

## 2021-09-16 PROCEDURE — 85007 BL SMEAR W/DIFF WBC COUNT: CPT | Performed by: INTERNAL MEDICINE

## 2021-09-16 PROCEDURE — 36558 INSERT TUNNELED CV CATH: CPT | Performed by: SURGERY

## 2021-09-16 PROCEDURE — C1750 CATH, HEMODIALYSIS,LONG-TERM: HCPCS | Performed by: SURGERY

## 2021-09-16 PROCEDURE — 36558 INSERT TUNNELED CV CATH: CPT

## 2021-09-16 PROCEDURE — C1769 GUIDE WIRE: HCPCS | Performed by: SURGERY

## 2021-09-16 PROCEDURE — 84100 ASSAY OF PHOSPHORUS: CPT | Performed by: INTERNAL MEDICINE

## 2021-09-16 PROCEDURE — 83735 ASSAY OF MAGNESIUM: CPT | Performed by: INTERNAL MEDICINE

## 2021-09-16 PROCEDURE — 97110 THERAPEUTIC EXERCISES: CPT

## 2021-09-16 PROCEDURE — 74018 RADEX ABDOMEN 1 VIEW: CPT

## 2021-09-16 PROCEDURE — 94799 UNLISTED PULMONARY SVC/PX: CPT

## 2021-09-16 PROCEDURE — 84703 CHORIONIC GONADOTROPIN ASSAY: CPT | Performed by: ANESTHESIOLOGY

## 2021-09-16 PROCEDURE — 86140 C-REACTIVE PROTEIN: CPT | Performed by: INTERNAL MEDICINE

## 2021-09-16 PROCEDURE — 25010000002 VANCOMYCIN 10 G RECONSTITUTED SOLUTION: Performed by: SURGERY

## 2021-09-16 PROCEDURE — 25010000002 HYDRALAZINE PER 20 MG: Performed by: SURGERY

## 2021-09-16 PROCEDURE — 25010000002 MORPHINE SULFATE (PF) 2 MG/ML SOLUTION: Performed by: INTERNAL MEDICINE

## 2021-09-16 PROCEDURE — 25010000002 HEPARIN (PORCINE) PER 1000 UNITS: Performed by: SURGERY

## 2021-09-16 PROCEDURE — 82465 ASSAY BLD/SERUM CHOLESTEROL: CPT | Performed by: INTERNAL MEDICINE

## 2021-09-16 PROCEDURE — 84478 ASSAY OF TRIGLYCERIDES: CPT | Performed by: INTERNAL MEDICINE

## 2021-09-16 PROCEDURE — 77001 FLUOROGUIDE FOR VEIN DEVICE: CPT | Performed by: SURGERY

## 2021-09-16 PROCEDURE — B548ZZA ULTRASONOGRAPHY OF SUPERIOR VENA CAVA, GUIDANCE: ICD-10-PCS | Performed by: SURGERY

## 2021-09-16 PROCEDURE — 82962 GLUCOSE BLOOD TEST: CPT

## 2021-09-16 PROCEDURE — 05HM33Z INSERTION OF INFUSION DEVICE INTO RIGHT INTERNAL JUGULAR VEIN, PERCUTANEOUS APPROACH: ICD-10-PCS | Performed by: SURGERY

## 2021-09-16 PROCEDURE — 25010000002 HEPARIN (PORCINE) PER 1000 UNITS: Performed by: INTERNAL MEDICINE

## 2021-09-16 PROCEDURE — 84134 ASSAY OF PREALBUMIN: CPT | Performed by: INTERNAL MEDICINE

## 2021-09-16 RX ORDER — HEPARIN SODIUM 1000 [USP'U]/ML
INJECTION, SOLUTION INTRAVENOUS; SUBCUTANEOUS AS NEEDED
Status: DISCONTINUED | OUTPATIENT
Start: 2021-09-16 | End: 2021-09-16 | Stop reason: HOSPADM

## 2021-09-16 RX ORDER — LIDOCAINE HYDROCHLORIDE 10 MG/ML
INJECTION, SOLUTION EPIDURAL; INFILTRATION; INTRACAUDAL; PERINEURAL AS NEEDED
Status: DISCONTINUED | OUTPATIENT
Start: 2021-09-16 | End: 2021-09-16 | Stop reason: HOSPADM

## 2021-09-16 RX ORDER — FLUMAZENIL 0.1 MG/ML
0.2 INJECTION INTRAVENOUS AS NEEDED
Status: DISCONTINUED | OUTPATIENT
Start: 2021-09-16 | End: 2021-09-16 | Stop reason: HOSPADM

## 2021-09-16 RX ORDER — LABETALOL HYDROCHLORIDE 5 MG/ML
5 INJECTION, SOLUTION INTRAVENOUS
Status: DISCONTINUED | OUTPATIENT
Start: 2021-09-16 | End: 2021-09-16 | Stop reason: HOSPADM

## 2021-09-16 RX ORDER — SODIUM CHLORIDE 0.9 % (FLUSH) 0.9 %
3 SYRINGE (ML) INJECTION EVERY 12 HOURS SCHEDULED
Status: DISCONTINUED | OUTPATIENT
Start: 2021-09-16 | End: 2021-09-16 | Stop reason: HOSPADM

## 2021-09-16 RX ORDER — LIDOCAINE HYDROCHLORIDE 20 MG/ML
INJECTION, SOLUTION EPIDURAL; INFILTRATION; INTRACAUDAL; PERINEURAL AS NEEDED
Status: DISCONTINUED | OUTPATIENT
Start: 2021-09-16 | End: 2021-09-16 | Stop reason: SURG

## 2021-09-16 RX ORDER — NALOXONE HCL 0.4 MG/ML
0.04 VIAL (ML) INJECTION AS NEEDED
Status: DISCONTINUED | OUTPATIENT
Start: 2021-09-16 | End: 2021-09-16 | Stop reason: HOSPADM

## 2021-09-16 RX ORDER — SODIUM CHLORIDE 9 MG/ML
50 INJECTION, SOLUTION INTRAVENOUS CONTINUOUS
Status: DISCONTINUED | OUTPATIENT
Start: 2021-09-16 | End: 2021-09-16

## 2021-09-16 RX ORDER — LIDOCAINE HYDROCHLORIDE 10 MG/ML
0.5 INJECTION, SOLUTION EPIDURAL; INFILTRATION; INTRACAUDAL; PERINEURAL ONCE AS NEEDED
Status: DISCONTINUED | OUTPATIENT
Start: 2021-09-16 | End: 2021-09-16 | Stop reason: HOSPADM

## 2021-09-16 RX ORDER — PROPOFOL 10 MG/ML
VIAL (ML) INTRAVENOUS AS NEEDED
Status: DISCONTINUED | OUTPATIENT
Start: 2021-09-16 | End: 2021-09-16 | Stop reason: SURG

## 2021-09-16 RX ORDER — ONDANSETRON 2 MG/ML
4 INJECTION INTRAMUSCULAR; INTRAVENOUS AS NEEDED
Status: DISCONTINUED | OUTPATIENT
Start: 2021-09-16 | End: 2021-09-16 | Stop reason: HOSPADM

## 2021-09-16 RX ORDER — SODIUM CHLORIDE 0.9 % (FLUSH) 0.9 %
3-10 SYRINGE (ML) INJECTION AS NEEDED
Status: DISCONTINUED | OUTPATIENT
Start: 2021-09-16 | End: 2021-09-16 | Stop reason: HOSPADM

## 2021-09-16 RX ORDER — FENTANYL CITRATE 50 UG/ML
25 INJECTION, SOLUTION INTRAMUSCULAR; INTRAVENOUS
Status: DISCONTINUED | OUTPATIENT
Start: 2021-09-16 | End: 2021-09-16 | Stop reason: HOSPADM

## 2021-09-16 RX ORDER — HEPARIN SODIUM 1000 [USP'U]/ML
3800 INJECTION, SOLUTION INTRAVENOUS; SUBCUTANEOUS ONCE
Status: COMPLETED | OUTPATIENT
Start: 2021-09-16 | End: 2021-09-16

## 2021-09-16 RX ORDER — HYDROMORPHONE HYDROCHLORIDE 1 MG/ML
0.25 INJECTION, SOLUTION INTRAMUSCULAR; INTRAVENOUS; SUBCUTANEOUS
Status: DISCONTINUED | OUTPATIENT
Start: 2021-09-16 | End: 2021-09-16 | Stop reason: HOSPADM

## 2021-09-16 RX ADMIN — CHLORHEXIDINE GLUCONATE 0.12% ORAL RINSE 15 ML: 1.2 LIQUID ORAL at 22:13

## 2021-09-16 RX ADMIN — HYDRALAZINE HYDROCHLORIDE 10 MG: 20 INJECTION INTRAMUSCULAR; INTRAVENOUS at 11:05

## 2021-09-16 RX ADMIN — LIDOCAINE HYDROCHLORIDE 60 MG: 20 INJECTION, SOLUTION EPIDURAL; INFILTRATION; INTRACAUDAL; PERINEURAL at 07:53

## 2021-09-16 RX ADMIN — LABETALOL HYDROCHLORIDE 20 MG: 5 INJECTION, SOLUTION INTRAVENOUS at 03:36

## 2021-09-16 RX ADMIN — I.V. FAT EMULSION 50 G: 20 EMULSION INTRAVENOUS at 17:01

## 2021-09-16 RX ADMIN — PROPOFOL 125 MCG/KG/MIN: 10 INJECTION, EMULSION INTRAVENOUS at 07:54

## 2021-09-16 RX ADMIN — BISACODYL 10 MG: 10 SUPPOSITORY RECTAL at 09:37

## 2021-09-16 RX ADMIN — ASCORBIC ACID, VITAMIN A PALMITATE, CHOLECALCIFEROL, THIAMINE HYDROCHLORIDE, RIBOFLAVIN-5 PHOSPHATE SODIUM, PYRIDOXINE HYDROCHLORIDE, NIACINAMIDE, DEXPANTHENOL, ALPHA-TOCOPHEROL ACETATE, VITAMIN K1, FOLIC ACID, BIOTIN, CYANOCOBALAMIN: 200; 3300; 200; 6; 3.6; 6; 40; 15; 10; 150; 600; 60; 5 INJECTION, SOLUTION INTRAVENOUS at 17:00

## 2021-09-16 RX ADMIN — ZINC SULFATE 220 MG (50 MG) CAPSULE 220 MG: CAPSULE at 09:37

## 2021-09-16 RX ADMIN — HEPARIN SODIUM 3800 UNITS: 1000 INJECTION, SOLUTION INTRAVENOUS; SUBCUTANEOUS at 22:07

## 2021-09-16 RX ADMIN — SODIUM CHLORIDE, PRESERVATIVE FREE 10 ML: 5 INJECTION INTRAVENOUS at 09:37

## 2021-09-16 RX ADMIN — Medication 1000 UNITS: at 09:37

## 2021-09-16 RX ADMIN — OXYCODONE HYDROCHLORIDE AND ACETAMINOPHEN 500 MG: 500 TABLET ORAL at 09:37

## 2021-09-16 RX ADMIN — SODIUM CHLORIDE, PRESERVATIVE FREE 10 ML: 5 INJECTION INTRAVENOUS at 22:13

## 2021-09-16 RX ADMIN — DOCUSATE SODIUM 50 MG AND SENNOSIDES 8.6 MG 1 TABLET: 8.6; 5 TABLET, FILM COATED ORAL at 09:37

## 2021-09-16 RX ADMIN — NEOSTIGMINE METHYLSULFATE 0.4 MG/HR: 1 INJECTION INTRAVENOUS at 16:44

## 2021-09-16 RX ADMIN — PROPOFOL 60 MG: 10 INJECTION, EMULSION INTRAVENOUS at 07:53

## 2021-09-16 RX ADMIN — ACETAMINOPHEN 650 MG: 325 TABLET, FILM COATED ORAL at 18:34

## 2021-09-16 RX ADMIN — DOCUSATE SODIUM 50 MG AND SENNOSIDES 8.6 MG 1 TABLET: 8.6; 5 TABLET, FILM COATED ORAL at 22:13

## 2021-09-16 RX ADMIN — MORPHINE SULFATE 2 MG: 2 INJECTION, SOLUTION INTRAMUSCULAR; INTRAVENOUS at 01:34

## 2021-09-16 RX ADMIN — VANCOMYCIN HYDROCHLORIDE 1500 MG: 10 INJECTION, POWDER, LYOPHILIZED, FOR SOLUTION INTRAVENOUS at 07:17

## 2021-09-16 RX ADMIN — SODIUM CHLORIDE 50 ML/HR: 9 INJECTION, SOLUTION INTRAVENOUS at 07:17

## 2021-09-16 NOTE — ANESTHESIA POSTPROCEDURE EVALUATION
Patient: Namita Zabala    Procedure Summary     Date: 09/16/21 Room / Location: [facility] OR  / [facility] HYBRID OR 12    Anesthesia Start: 0742 Anesthesia Stop: 0827    Procedure: HEMODIALYSIS CATHETER INSERTION (Left Neck) Diagnosis:       JUVENAL (acute kidney injury) (CMS/[facility])      (JUVENAL (acute kidney injury) (CMS/[facility]) [N17.9])    Surgeons: nAders Kaur MD Provider: Vijay Javier CRNA    Anesthesia Type: MAC ASA Status: 4          Anesthesia Type: MAC    Vitals  No vitals data found for the desired time range.          Post Anesthesia Care and Evaluation    Patient location during evaluation: ICU  Patient participation: waiting for patient participation  Level of consciousness: responsive to verbal stimuli  Pain score: 0  Pain management: adequate  Airway patency: patent  Anesthetic complications: No anesthetic complications  PONV Status: none  Cardiovascular status: acceptable  Respiratory status: acceptable  Hydration status: acceptable

## 2021-09-16 NOTE — ANESTHESIA PREPROCEDURE EVALUATION
Anesthesia Evaluation     Patient summary reviewed   no history of anesthetic complications:  NPO Solid Status: > 8 hours  NPO Liquid Status: > 8 hours           Airway   Mallampati: III  TM distance: <3 FB  Possible difficult intubation and Small opening  Comment: Limited by patient participation  Dental      Pulmonary    (+) pneumonia (COVID, admission 8/27 symptoms for 2 weeks prior to admission) improving , pulmonary embolism,     ROS comment: Extubated 9/10  Cardiovascular     (+) valvular problems/murmurs MVP,   (-) hypertension, hyperlipidemia      Neuro/Psych  (-) seizures, TIA, CVA  GI/Hepatic/Renal/Endo    (+)   renal disease ARF and dialysis,   (-) liver disease, diabetes (hyperglyxemia with infection)    Musculoskeletal     Abdominal    Substance History      OB/GYN          Other   blood dyscrasia anemia,     ROS/Med Hx Other: Ileus- resolved with neostigmine                  Anesthesia Plan    ASA 4     MAC     intravenous induction     Anesthetic plan, all risks, benefits, and alternatives have been provided, discussed and informed consent has been obtained with: patient.

## 2021-09-17 ENCOUNTER — APPOINTMENT (OUTPATIENT)
Dept: GENERAL RADIOLOGY | Facility: HOSPITAL | Age: 46
End: 2021-09-17

## 2021-09-17 ENCOUNTER — APPOINTMENT (OUTPATIENT)
Dept: ULTRASOUND IMAGING | Facility: HOSPITAL | Age: 46
End: 2021-09-17

## 2021-09-17 LAB
ALBUMIN SERPL-MCNC: 2.8 G/DL (ref 3.5–5.2)
ALBUMIN/GLOB SERPL: 1.1 G/DL
ALP SERPL-CCNC: 61 U/L (ref 39–117)
ALT SERPL W P-5'-P-CCNC: 14 U/L (ref 1–33)
ANION GAP SERPL CALCULATED.3IONS-SCNC: 13 MMOL/L (ref 5–15)
AST SERPL-CCNC: 24 U/L (ref 1–32)
BILIRUB SERPL-MCNC: 0.8 MG/DL (ref 0–1.2)
BUN SERPL-MCNC: 62 MG/DL (ref 6–20)
BUN/CREAT SERPL: 18.3 (ref 7–25)
CA-I BLD-MCNC: 4.5 MG/DL (ref 4.6–5.4)
CA-I BLD-MCNC: 4.6 MG/DL (ref 4.6–5.4)
CALCIUM SPEC-SCNC: 8.2 MG/DL (ref 8.6–10.5)
CHLORIDE SERPL-SCNC: 102 MMOL/L (ref 98–107)
CO2 SERPL-SCNC: 25 MMOL/L (ref 22–29)
CREAT SERPL-MCNC: 3.39 MG/DL (ref 0.57–1)
GFR SERPL CREATININE-BSD FRML MDRD: 15 ML/MIN/1.73
GLOBULIN UR ELPH-MCNC: 2.5 GM/DL
GLUCOSE BLDC GLUCOMTR-MCNC: 159 MG/DL (ref 70–130)
GLUCOSE BLDC GLUCOMTR-MCNC: 165 MG/DL (ref 70–130)
GLUCOSE BLDC GLUCOMTR-MCNC: 169 MG/DL (ref 70–130)
GLUCOSE BLDC GLUCOMTR-MCNC: 182 MG/DL (ref 70–130)
GLUCOSE BLDC GLUCOMTR-MCNC: 211 MG/DL (ref 70–130)
GLUCOSE SERPL-MCNC: 156 MG/DL (ref 65–99)
Lab: ABNORMAL
Lab: NORMAL
MAGNESIUM SERPL-MCNC: 2.1 MG/DL (ref 1.6–2.6)
PHOSPHATE SERPL-MCNC: 4 MG/DL (ref 2.5–4.5)
POTASSIUM SERPL-SCNC: 3.4 MMOL/L (ref 3.5–5.2)
PROT SERPL-MCNC: 5.3 G/DL (ref 6–8.5)
SODIUM SERPL-SCNC: 140 MMOL/L (ref 136–145)

## 2021-09-17 PROCEDURE — 76775 US EXAM ABDO BACK WALL LIM: CPT

## 2021-09-17 PROCEDURE — 25010000002 EPOETIN ALFA-EPBX 10000 UNIT/ML SOLUTION: Performed by: SURGERY

## 2021-09-17 PROCEDURE — 25010000003 POTASSIUM CHLORIDE PER 2 MEQ: Performed by: NURSE PRACTITIONER

## 2021-09-17 PROCEDURE — 97110 THERAPEUTIC EXERCISES: CPT | Performed by: OCCUPATIONAL THERAPIST

## 2021-09-17 PROCEDURE — 25010000002 NEOSTIGMINE 10 MG/10ML SOLUTION 10 ML VIAL: Performed by: INTERNAL MEDICINE

## 2021-09-17 PROCEDURE — 97535 SELF CARE MNGMENT TRAINING: CPT | Performed by: OCCUPATIONAL THERAPIST

## 2021-09-17 PROCEDURE — 94799 UNLISTED PULMONARY SVC/PX: CPT

## 2021-09-17 PROCEDURE — 25010000002 ONDANSETRON PER 1 MG: Performed by: SURGERY

## 2021-09-17 PROCEDURE — 74018 RADEX ABDOMEN 1 VIEW: CPT

## 2021-09-17 PROCEDURE — 63710000001 INSULIN REGULAR HUMAN PER 5 UNITS: Performed by: SURGERY

## 2021-09-17 PROCEDURE — 84100 ASSAY OF PHOSPHORUS: CPT | Performed by: SURGERY

## 2021-09-17 PROCEDURE — 80053 COMPREHEN METABOLIC PANEL: CPT | Performed by: SURGERY

## 2021-09-17 PROCEDURE — 92610 EVALUATE SWALLOWING FUNCTION: CPT | Performed by: SPEECH-LANGUAGE PATHOLOGIST

## 2021-09-17 PROCEDURE — 82962 GLUCOSE BLOOD TEST: CPT

## 2021-09-17 PROCEDURE — 97110 THERAPEUTIC EXERCISES: CPT

## 2021-09-17 PROCEDURE — 82330 ASSAY OF CALCIUM: CPT

## 2021-09-17 PROCEDURE — 83735 ASSAY OF MAGNESIUM: CPT | Performed by: SURGERY

## 2021-09-17 RX ORDER — ACETAMINOPHEN 500 MG
1000 TABLET ORAL EVERY 4 HOURS PRN
Status: DISCONTINUED | OUTPATIENT
Start: 2021-09-17 | End: 2021-09-24 | Stop reason: HOSPADM

## 2021-09-17 RX ORDER — SODIUM CHLORIDE 234 MG/ML
10 INJECTION, SOLUTION INTRAVENOUS
Status: DISCONTINUED | OUTPATIENT
Start: 2021-09-17 | End: 2021-09-24 | Stop reason: HOSPADM

## 2021-09-17 RX ORDER — POLYETHYLENE GLYCOL 3350 17 G/17G
17 POWDER, FOR SOLUTION ORAL ONCE
Status: COMPLETED | OUTPATIENT
Start: 2021-09-17 | End: 2021-09-17

## 2021-09-17 RX ORDER — ALBUMIN (HUMAN) 12.5 G/50ML
12.5 SOLUTION INTRAVENOUS AS NEEDED
Status: DISCONTINUED | OUTPATIENT
Start: 2021-09-17 | End: 2021-09-17

## 2021-09-17 RX ORDER — POTASSIUM CHLORIDE 29.8 MG/ML
20 INJECTION INTRAVENOUS
Status: COMPLETED | OUTPATIENT
Start: 2021-09-17 | End: 2021-09-17

## 2021-09-17 RX ORDER — HEPARIN SODIUM 1000 [USP'U]/ML
4000 INJECTION, SOLUTION INTRAVENOUS; SUBCUTANEOUS ONCE
Status: DISCONTINUED | OUTPATIENT
Start: 2021-09-17 | End: 2021-09-17

## 2021-09-17 RX ADMIN — BISACODYL 10 MG: 10 SUPPOSITORY RECTAL at 08:30

## 2021-09-17 RX ADMIN — DOCUSATE SODIUM 50 MG AND SENNOSIDES 8.6 MG 1 TABLET: 8.6; 5 TABLET, FILM COATED ORAL at 21:37

## 2021-09-17 RX ADMIN — POLYETHYLENE GLYCOL 3350 17 G: 17 POWDER, FOR SOLUTION ORAL at 18:19

## 2021-09-17 RX ADMIN — ASCORBIC ACID, VITAMIN A PALMITATE, CHOLECALCIFEROL, THIAMINE HYDROCHLORIDE, RIBOFLAVIN-5 PHOSPHATE SODIUM, PYRIDOXINE HYDROCHLORIDE, NIACINAMIDE, DEXPANTHENOL, ALPHA-TOCOPHEROL ACETATE, VITAMIN K1, FOLIC ACID, BIOTIN, CYANOCOBALAMIN: 200; 3300; 200; 6; 3.6; 6; 40; 15; 10; 150; 600; 60; 5 INJECTION, SOLUTION INTRAVENOUS at 18:18

## 2021-09-17 RX ADMIN — ONDANSETRON 4 MG: 2 INJECTION INTRAMUSCULAR; INTRAVENOUS at 19:22

## 2021-09-17 RX ADMIN — ACETAMINOPHEN 1000 MG: 500 TABLET, FILM COATED ORAL at 21:37

## 2021-09-17 RX ADMIN — INSULIN HUMAN 2 UNITS: 100 INJECTION, SOLUTION PARENTERAL at 15:22

## 2021-09-17 RX ADMIN — SODIUM CHLORIDE, PRESERVATIVE FREE 10 ML: 5 INJECTION INTRAVENOUS at 19:22

## 2021-09-17 RX ADMIN — INSULIN HUMAN 4 UNITS: 100 INJECTION, SOLUTION PARENTERAL at 00:45

## 2021-09-17 RX ADMIN — ONDANSETRON 4 MG: 2 INJECTION INTRAMUSCULAR; INTRAVENOUS at 00:08

## 2021-09-17 RX ADMIN — POTASSIUM CHLORIDE 20 MEQ: 29.8 INJECTION, SOLUTION INTRAVENOUS at 10:30

## 2021-09-17 RX ADMIN — INSULIN HUMAN 2 UNITS: 100 INJECTION, SOLUTION PARENTERAL at 23:52

## 2021-09-17 RX ADMIN — SODIUM CHLORIDE, PRESERVATIVE FREE 10 ML: 5 INJECTION INTRAVENOUS at 21:38

## 2021-09-17 RX ADMIN — POTASSIUM CHLORIDE 20 MEQ: 29.8 INJECTION, SOLUTION INTRAVENOUS at 08:33

## 2021-09-17 RX ADMIN — INSULIN HUMAN 2 UNITS: 100 INJECTION, SOLUTION PARENTERAL at 18:18

## 2021-09-17 RX ADMIN — INSULIN HUMAN 2 UNITS: 100 INJECTION, SOLUTION PARENTERAL at 06:38

## 2021-09-17 RX ADMIN — NEOSTIGMINE METHYLSULFATE 0.4 MG/HR: 1 INJECTION INTRAVENOUS at 04:43

## 2021-09-17 RX ADMIN — EPOETIN ALFA-EPBX 10000 UNITS: 10000 INJECTION, SOLUTION INTRAVENOUS; SUBCUTANEOUS at 10:28

## 2021-09-17 RX ADMIN — ONDANSETRON 4 MG: 2 INJECTION INTRAMUSCULAR; INTRAVENOUS at 08:31

## 2021-09-18 ENCOUNTER — APPOINTMENT (OUTPATIENT)
Dept: GENERAL RADIOLOGY | Facility: HOSPITAL | Age: 46
End: 2021-09-18

## 2021-09-18 LAB
ALBUMIN SERPL-MCNC: 3.2 G/DL (ref 3.5–5.2)
ALBUMIN/GLOB SERPL: 1.2 G/DL
ALP SERPL-CCNC: 67 U/L (ref 39–117)
ALT SERPL W P-5'-P-CCNC: 15 U/L (ref 1–33)
ANION GAP SERPL CALCULATED.3IONS-SCNC: 12 MMOL/L (ref 5–15)
ANISOCYTOSIS BLD QL: ABNORMAL
AST SERPL-CCNC: 16 U/L (ref 1–32)
BASOPHILS # BLD MANUAL: 0.28 10*3/MM3 (ref 0–0.2)
BASOPHILS NFR BLD AUTO: 2 % (ref 0–1.5)
BILIRUB SERPL-MCNC: 0.6 MG/DL (ref 0–1.2)
BUN SERPL-MCNC: 82 MG/DL (ref 6–20)
BUN/CREAT SERPL: 21.1 (ref 7–25)
CA-I BLD-MCNC: 4.61 MG/DL (ref 4.6–5.4)
CALCIUM SPEC-SCNC: 8.8 MG/DL (ref 8.6–10.5)
CHLORIDE SERPL-SCNC: 103 MMOL/L (ref 98–107)
CO2 SERPL-SCNC: 23 MMOL/L (ref 22–29)
CREAT SERPL-MCNC: 3.89 MG/DL (ref 0.57–1)
DEPRECATED RDW RBC AUTO: 59 FL (ref 37–54)
EOSINOPHIL # BLD MANUAL: 1.96 10*3/MM3 (ref 0–0.4)
EOSINOPHIL NFR BLD MANUAL: 14 % (ref 0.3–6.2)
ERYTHROCYTE [DISTWIDTH] IN BLOOD BY AUTOMATED COUNT: 16.8 % (ref 12.3–15.4)
GFR SERPL CREATININE-BSD FRML MDRD: 13 ML/MIN/1.73
GFR SERPL CREATININE-BSD FRML MDRD: ABNORMAL ML/MIN/{1.73_M2}
GLOBULIN UR ELPH-MCNC: 2.6 GM/DL
GLUCOSE BLDC GLUCOMTR-MCNC: 117 MG/DL (ref 70–130)
GLUCOSE BLDC GLUCOMTR-MCNC: 154 MG/DL (ref 70–130)
GLUCOSE SERPL-MCNC: 160 MG/DL (ref 65–99)
HCT VFR BLD AUTO: 21.5 % (ref 34–46.6)
HGB BLD-MCNC: 7 G/DL (ref 12–15.9)
LYMPHOCYTES # BLD MANUAL: 1.12 10*3/MM3 (ref 0.7–3.1)
LYMPHOCYTES NFR BLD MANUAL: 3 % (ref 5–12)
LYMPHOCYTES NFR BLD MANUAL: 8 % (ref 19.6–45.3)
Lab: NORMAL
MAGNESIUM SERPL-MCNC: 2.2 MG/DL (ref 1.6–2.6)
MCH RBC QN AUTO: 32.1 PG (ref 26.6–33)
MCHC RBC AUTO-ENTMCNC: 32.6 G/DL (ref 31.5–35.7)
MCV RBC AUTO: 98.6 FL (ref 79–97)
METAMYELOCYTES NFR BLD MANUAL: 1 % (ref 0–0)
MONOCYTES # BLD AUTO: 0.42 10*3/MM3 (ref 0.1–0.9)
MYELOCYTES NFR BLD MANUAL: 2 % (ref 0–0)
NEUTROPHILS # BLD AUTO: 9.82 10*3/MM3 (ref 1.7–7)
NEUTROPHILS NFR BLD MANUAL: 70 % (ref 42.7–76)
NRBC SPEC MANUAL: 1 /100 WBC (ref 0–0.2)
PHOSPHATE SERPL-MCNC: 5.4 MG/DL (ref 2.5–4.5)
PLAT MORPH BLD: NORMAL
PLATELET # BLD AUTO: 189 10*3/MM3 (ref 140–450)
PMV BLD AUTO: 10.9 FL (ref 6–12)
POLYCHROMASIA BLD QL SMEAR: ABNORMAL
POTASSIUM SERPL-SCNC: 3.4 MMOL/L (ref 3.5–5.2)
PROT SERPL-MCNC: 5.8 G/DL (ref 6–8.5)
RBC # BLD AUTO: 2.18 10*6/MM3 (ref 3.77–5.28)
SODIUM SERPL-SCNC: 138 MMOL/L (ref 136–145)
WBC # BLD AUTO: 14.03 10*3/MM3 (ref 3.4–10.8)
WBC MORPH BLD: NORMAL

## 2021-09-18 PROCEDURE — 63710000001 INSULIN REGULAR HUMAN PER 5 UNITS: Performed by: SURGERY

## 2021-09-18 PROCEDURE — 86923 COMPATIBILITY TEST ELECTRIC: CPT

## 2021-09-18 PROCEDURE — 80053 COMPREHEN METABOLIC PANEL: CPT | Performed by: SURGERY

## 2021-09-18 PROCEDURE — 94799 UNLISTED PULMONARY SVC/PX: CPT

## 2021-09-18 PROCEDURE — 83735 ASSAY OF MAGNESIUM: CPT | Performed by: SURGERY

## 2021-09-18 PROCEDURE — 25010000002 HEPARIN (PORCINE) PER 1000 UNITS: Performed by: INTERNAL MEDICINE

## 2021-09-18 PROCEDURE — 82962 GLUCOSE BLOOD TEST: CPT

## 2021-09-18 PROCEDURE — 86901 BLOOD TYPING SEROLOGIC RH(D): CPT

## 2021-09-18 PROCEDURE — 82330 ASSAY OF CALCIUM: CPT

## 2021-09-18 PROCEDURE — 25010000002 IRON SUCROSE PER 1 MG: Performed by: INTERNAL MEDICINE

## 2021-09-18 PROCEDURE — 86900 BLOOD TYPING SEROLOGIC ABO: CPT

## 2021-09-18 PROCEDURE — 85025 COMPLETE CBC W/AUTO DIFF WBC: CPT | Performed by: INTERNAL MEDICINE

## 2021-09-18 PROCEDURE — 84100 ASSAY OF PHOSPHORUS: CPT | Performed by: SURGERY

## 2021-09-18 PROCEDURE — 85007 BL SMEAR W/DIFF WBC COUNT: CPT | Performed by: INTERNAL MEDICINE

## 2021-09-18 PROCEDURE — 97110 THERAPEUTIC EXERCISES: CPT

## 2021-09-18 PROCEDURE — 74018 RADEX ABDOMEN 1 VIEW: CPT

## 2021-09-18 RX ORDER — LIDOCAINE HYDROCHLORIDE 10 MG/ML
1 INJECTION, SOLUTION INFILTRATION; PERINEURAL ONCE
Status: DISCONTINUED | OUTPATIENT
Start: 2021-09-18 | End: 2021-09-22

## 2021-09-18 RX ORDER — HEPARIN SODIUM 1000 [USP'U]/ML
3800 INJECTION, SOLUTION INTRAVENOUS; SUBCUTANEOUS ONCE
Status: COMPLETED | OUTPATIENT
Start: 2021-09-18 | End: 2021-09-18

## 2021-09-18 RX ADMIN — IRON SUCROSE 200 MG: 20 INJECTION, SOLUTION INTRAVENOUS at 14:32

## 2021-09-18 RX ADMIN — Medication 1000 UNITS: at 08:30

## 2021-09-18 RX ADMIN — INSULIN HUMAN 2 UNITS: 100 INJECTION, SOLUTION PARENTERAL at 05:59

## 2021-09-18 RX ADMIN — ACETAMINOPHEN 650 MG: 325 TABLET, FILM COATED ORAL at 09:13

## 2021-09-18 RX ADMIN — SODIUM CHLORIDE, PRESERVATIVE FREE 10 ML: 5 INJECTION INTRAVENOUS at 08:30

## 2021-09-18 RX ADMIN — SODIUM CHLORIDE, PRESERVATIVE FREE 10 ML: 5 INJECTION INTRAVENOUS at 23:23

## 2021-09-18 RX ADMIN — DOCUSATE SODIUM 50 MG AND SENNOSIDES 8.6 MG 1 TABLET: 8.6; 5 TABLET, FILM COATED ORAL at 08:30

## 2021-09-18 RX ADMIN — CHLORHEXIDINE GLUCONATE 0.12% ORAL RINSE 15 ML: 1.2 LIQUID ORAL at 08:30

## 2021-09-18 RX ADMIN — OXYCODONE HYDROCHLORIDE AND ACETAMINOPHEN 500 MG: 500 TABLET ORAL at 08:30

## 2021-09-18 RX ADMIN — INSULIN HUMAN 2 UNITS: 100 INJECTION, SOLUTION PARENTERAL at 12:06

## 2021-09-18 RX ADMIN — HEPARIN SODIUM 3800 UNITS: 1000 INJECTION, SOLUTION INTRAVENOUS; SUBCUTANEOUS at 20:01

## 2021-09-18 RX ADMIN — ZINC SULFATE 220 MG (50 MG) CAPSULE 220 MG: CAPSULE at 08:30

## 2021-09-19 LAB
ALBUMIN SERPL-MCNC: 3.2 G/DL (ref 3.5–5.2)
ALBUMIN/GLOB SERPL: 1.1 G/DL
ALP SERPL-CCNC: 82 U/L (ref 39–117)
ALT SERPL W P-5'-P-CCNC: 21 U/L (ref 1–33)
ANION GAP SERPL CALCULATED.3IONS-SCNC: 13 MMOL/L (ref 5–15)
AST SERPL-CCNC: 28 U/L (ref 1–32)
BACTERIA UR QL AUTO: ABNORMAL /HPF
BASOPHILS # BLD AUTO: 0.09 10*3/MM3 (ref 0–0.2)
BASOPHILS NFR BLD AUTO: 0.8 % (ref 0–1.5)
BH BB BLOOD EXPIRATION DATE: NORMAL
BH BB BLOOD TYPE BARCODE: 6200
BH BB DISPENSE STATUS: NORMAL
BH BB PRODUCT CODE: NORMAL
BH BB UNIT NUMBER: NORMAL
BILIRUB SERPL-MCNC: 0.7 MG/DL (ref 0–1.2)
BILIRUB UR QL STRIP: NEGATIVE
BUN SERPL-MCNC: 53 MG/DL (ref 6–20)
BUN/CREAT SERPL: 19.8 (ref 7–25)
CALCIUM SPEC-SCNC: 8.9 MG/DL (ref 8.6–10.5)
CHLORIDE SERPL-SCNC: 101 MMOL/L (ref 98–107)
CLARITY UR: CLEAR
CO2 SERPL-SCNC: 24 MMOL/L (ref 22–29)
COLOR UR: ABNORMAL
CREAT SERPL-MCNC: 2.68 MG/DL (ref 0.57–1)
CROSSMATCH INTERPRETATION: NORMAL
DEPRECATED RDW RBC AUTO: 60.2 FL (ref 37–54)
EOSINOPHIL # BLD AUTO: 0.86 10*3/MM3 (ref 0–0.4)
EOSINOPHIL NFR BLD AUTO: 7.2 % (ref 0.3–6.2)
ERYTHROCYTE [DISTWIDTH] IN BLOOD BY AUTOMATED COUNT: 17.4 % (ref 12.3–15.4)
GFR SERPL CREATININE-BSD FRML MDRD: 19 ML/MIN/1.73
GLOBULIN UR ELPH-MCNC: 2.8 GM/DL
GLUCOSE BLDC GLUCOMTR-MCNC: 100 MG/DL (ref 70–130)
GLUCOSE BLDC GLUCOMTR-MCNC: 108 MG/DL (ref 70–130)
GLUCOSE BLDC GLUCOMTR-MCNC: 128 MG/DL (ref 70–130)
GLUCOSE BLDC GLUCOMTR-MCNC: 155 MG/DL (ref 70–130)
GLUCOSE SERPL-MCNC: 135 MG/DL (ref 65–99)
GLUCOSE UR STRIP-MCNC: NEGATIVE MG/DL
HCT VFR BLD AUTO: 22.7 % (ref 34–46.6)
HGB BLD-MCNC: 7.2 G/DL (ref 12–15.9)
HGB UR QL STRIP.AUTO: ABNORMAL
HYALINE CASTS UR QL AUTO: ABNORMAL /LPF
IMM GRANULOCYTES # BLD AUTO: 0.56 10*3/MM3 (ref 0–0.05)
IMM GRANULOCYTES NFR BLD AUTO: 4.7 % (ref 0–0.5)
KETONES UR QL STRIP: NEGATIVE
LEUKOCYTE ESTERASE UR QL STRIP.AUTO: ABNORMAL
LYMPHOCYTES # BLD AUTO: 0.84 10*3/MM3 (ref 0.7–3.1)
LYMPHOCYTES NFR BLD AUTO: 7 % (ref 19.6–45.3)
MCH RBC QN AUTO: 31.4 PG (ref 26.6–33)
MCHC RBC AUTO-ENTMCNC: 31.7 G/DL (ref 31.5–35.7)
MCV RBC AUTO: 99.1 FL (ref 79–97)
MONOCYTES # BLD AUTO: 1.24 10*3/MM3 (ref 0.1–0.9)
MONOCYTES NFR BLD AUTO: 10.4 % (ref 5–12)
NEUTROPHILS NFR BLD AUTO: 69.9 % (ref 42.7–76)
NEUTROPHILS NFR BLD AUTO: 8.34 10*3/MM3 (ref 1.7–7)
NITRITE UR QL STRIP: NEGATIVE
NRBC BLD AUTO-RTO: 0 /100 WBC (ref 0–0.2)
PH UR STRIP.AUTO: <=5 [PH] (ref 5–8)
PLATELET # BLD AUTO: 207 10*3/MM3 (ref 140–450)
PMV BLD AUTO: 10.7 FL (ref 6–12)
POTASSIUM SERPL-SCNC: 3.5 MMOL/L (ref 3.5–5.2)
PROT SERPL-MCNC: 6 G/DL (ref 6–8.5)
PROT UR QL STRIP: ABNORMAL
RBC # BLD AUTO: 2.29 10*6/MM3 (ref 3.77–5.28)
RBC # UR: ABNORMAL /HPF
REF LAB TEST METHOD: ABNORMAL
SODIUM SERPL-SCNC: 138 MMOL/L (ref 136–145)
SP GR UR STRIP: 1.01 (ref 1–1.03)
SQUAMOUS #/AREA URNS HPF: ABNORMAL /HPF
UNIT  ABO: NORMAL
UNIT  RH: NORMAL
UROBILINOGEN UR QL STRIP: ABNORMAL
WBC # BLD AUTO: 11.93 10*3/MM3 (ref 3.4–10.8)
WBC UR QL AUTO: ABNORMAL /HPF
YEAST URNS QL MICRO: ABNORMAL /HPF

## 2021-09-19 PROCEDURE — 80053 COMPREHEN METABOLIC PANEL: CPT | Performed by: SURGERY

## 2021-09-19 PROCEDURE — 93005 ELECTROCARDIOGRAM TRACING: CPT | Performed by: INTERNAL MEDICINE

## 2021-09-19 PROCEDURE — 25010000002 ONDANSETRON PER 1 MG: Performed by: SURGERY

## 2021-09-19 PROCEDURE — 93010 ELECTROCARDIOGRAM REPORT: CPT | Performed by: INTERNAL MEDICINE

## 2021-09-19 PROCEDURE — 81001 URINALYSIS AUTO W/SCOPE: CPT | Performed by: INTERNAL MEDICINE

## 2021-09-19 PROCEDURE — 25010000002 IRON SUCROSE PER 1 MG: Performed by: INTERNAL MEDICINE

## 2021-09-19 PROCEDURE — 87086 URINE CULTURE/COLONY COUNT: CPT | Performed by: INTERNAL MEDICINE

## 2021-09-19 PROCEDURE — 87070 CULTURE OTHR SPECIMN AEROBIC: CPT | Performed by: INTERNAL MEDICINE

## 2021-09-19 PROCEDURE — 85025 COMPLETE CBC W/AUTO DIFF WBC: CPT | Performed by: INTERNAL MEDICINE

## 2021-09-19 PROCEDURE — 87040 BLOOD CULTURE FOR BACTERIA: CPT | Performed by: INTERNAL MEDICINE

## 2021-09-19 PROCEDURE — 82962 GLUCOSE BLOOD TEST: CPT

## 2021-09-19 PROCEDURE — 97530 THERAPEUTIC ACTIVITIES: CPT

## 2021-09-19 RX ORDER — FUROSEMIDE 10 MG/ML
40 INJECTION INTRAMUSCULAR; INTRAVENOUS ONCE
Status: DISCONTINUED | OUTPATIENT
Start: 2021-09-19 | End: 2021-09-19

## 2021-09-19 RX ORDER — METOCLOPRAMIDE HYDROCHLORIDE 5 MG/ML
10 INJECTION INTRAMUSCULAR; INTRAVENOUS EVERY 6 HOURS
Status: DISCONTINUED | OUTPATIENT
Start: 2021-09-19 | End: 2021-09-20

## 2021-09-19 RX ADMIN — IRON SUCROSE 200 MG: 20 INJECTION, SOLUTION INTRAVENOUS at 15:29

## 2021-09-19 RX ADMIN — ACETAMINOPHEN 650 MG: 650 SUPPOSITORY RECTAL at 15:36

## 2021-09-19 RX ADMIN — SODIUM CHLORIDE, PRESERVATIVE FREE 10 ML: 5 INJECTION INTRAVENOUS at 21:17

## 2021-09-19 RX ADMIN — SODIUM CHLORIDE, PRESERVATIVE FREE 10 ML: 5 INJECTION INTRAVENOUS at 09:59

## 2021-09-19 RX ADMIN — CLONIDINE 1 PATCH: 0.1 PATCH TRANSDERMAL at 10:01

## 2021-09-19 RX ADMIN — Medication 1000 UNITS: at 09:58

## 2021-09-19 RX ADMIN — DOCUSATE SODIUM 50 MG AND SENNOSIDES 8.6 MG 1 TABLET: 8.6; 5 TABLET, FILM COATED ORAL at 21:16

## 2021-09-19 RX ADMIN — CHLORHEXIDINE GLUCONATE 0.12% ORAL RINSE 15 ML: 1.2 LIQUID ORAL at 21:17

## 2021-09-19 RX ADMIN — ONDANSETRON 4 MG: 2 INJECTION INTRAMUSCULAR; INTRAVENOUS at 10:16

## 2021-09-19 RX ADMIN — ASCORBIC ACID, VITAMIN A PALMITATE, CHOLECALCIFEROL, THIAMINE HYDROCHLORIDE, RIBOFLAVIN-5 PHOSPHATE SODIUM, PYRIDOXINE HYDROCHLORIDE, NIACINAMIDE, DEXPANTHENOL, ALPHA-TOCOPHEROL ACETATE, VITAMIN K1, FOLIC ACID, BIOTIN, CYANOCOBALAMIN: 200; 3300; 200; 6; 3.6; 6; 40; 15; 10; 150; 600; 60; 5 INJECTION, SOLUTION INTRAVENOUS at 00:15

## 2021-09-19 RX ADMIN — OXYCODONE HYDROCHLORIDE AND ACETAMINOPHEN 500 MG: 500 TABLET ORAL at 09:58

## 2021-09-19 RX ADMIN — CHLORHEXIDINE GLUCONATE 0.12% ORAL RINSE 15 ML: 1.2 LIQUID ORAL at 09:58

## 2021-09-19 RX ADMIN — ZINC SULFATE 220 MG (50 MG) CAPSULE 220 MG: CAPSULE at 09:58

## 2021-09-19 RX ADMIN — I.V. FAT EMULSION 50 G: 20 EMULSION INTRAVENOUS at 01:54

## 2021-09-20 ENCOUNTER — APPOINTMENT (OUTPATIENT)
Dept: GENERAL RADIOLOGY | Facility: HOSPITAL | Age: 46
End: 2021-09-20

## 2021-09-20 ENCOUNTER — APPOINTMENT (OUTPATIENT)
Dept: CT IMAGING | Facility: HOSPITAL | Age: 46
End: 2021-09-20

## 2021-09-20 LAB
ALBUMIN SERPL-MCNC: 3.5 G/DL (ref 3.5–5.2)
ALBUMIN/GLOB SERPL: 1.1 G/DL
ALP SERPL-CCNC: 96 U/L (ref 39–117)
ALT SERPL W P-5'-P-CCNC: 23 U/L (ref 1–33)
ANION GAP SERPL CALCULATED.3IONS-SCNC: 13 MMOL/L (ref 5–15)
AST SERPL-CCNC: 26 U/L (ref 1–32)
BACTERIA SPEC AEROBE CULT: ABNORMAL
BASOPHILS # BLD AUTO: 0.07 10*3/MM3 (ref 0–0.2)
BASOPHILS NFR BLD AUTO: 0.3 % (ref 0–1.5)
BILIRUB SERPL-MCNC: 0.9 MG/DL (ref 0–1.2)
BUN SERPL-MCNC: 64 MG/DL (ref 6–20)
BUN/CREAT SERPL: 19.7 (ref 7–25)
CA-I BLD-MCNC: 4.64 MG/DL (ref 4.6–5.4)
CALCIUM SPEC-SCNC: 9.5 MG/DL (ref 8.6–10.5)
CHLORIDE SERPL-SCNC: 99 MMOL/L (ref 98–107)
CO2 SERPL-SCNC: 26 MMOL/L (ref 22–29)
CREAT SERPL-MCNC: 3.25 MG/DL (ref 0.57–1)
CRP SERPL-MCNC: 11.07 MG/DL (ref 0–0.5)
DEPRECATED RDW RBC AUTO: 63.6 FL (ref 37–54)
EOSINOPHIL # BLD AUTO: 0.1 10*3/MM3 (ref 0–0.4)
EOSINOPHIL NFR BLD AUTO: 0.5 % (ref 0.3–6.2)
ERYTHROCYTE [DISTWIDTH] IN BLOOD BY AUTOMATED COUNT: 17.8 % (ref 12.3–15.4)
GFR SERPL CREATININE-BSD FRML MDRD: 15 ML/MIN/1.73
GLOBULIN UR ELPH-MCNC: 3.1 GM/DL
GLUCOSE BLDC GLUCOMTR-MCNC: 109 MG/DL (ref 70–130)
GLUCOSE BLDC GLUCOMTR-MCNC: 111 MG/DL (ref 70–130)
GLUCOSE BLDC GLUCOMTR-MCNC: 113 MG/DL (ref 70–130)
GLUCOSE BLDC GLUCOMTR-MCNC: 115 MG/DL (ref 70–130)
GLUCOSE BLDC GLUCOMTR-MCNC: 99 MG/DL (ref 70–130)
GLUCOSE SERPL-MCNC: 107 MG/DL (ref 65–99)
HCT VFR BLD AUTO: 23.5 % (ref 34–46.6)
HGB BLD-MCNC: 7.4 G/DL (ref 12–15.9)
IMM GRANULOCYTES # BLD AUTO: 0.34 10*3/MM3 (ref 0–0.05)
IMM GRANULOCYTES NFR BLD AUTO: 1.5 % (ref 0–0.5)
LYMPHOCYTES # BLD AUTO: 0.84 10*3/MM3 (ref 0.7–3.1)
LYMPHOCYTES NFR BLD AUTO: 3.8 % (ref 19.6–45.3)
MAGNESIUM SERPL-MCNC: 2.2 MG/DL (ref 1.6–2.6)
MCH RBC QN AUTO: 32.3 PG (ref 26.6–33)
MCHC RBC AUTO-ENTMCNC: 31.5 G/DL (ref 31.5–35.7)
MCV RBC AUTO: 102.6 FL (ref 79–97)
MONOCYTES # BLD AUTO: 2.13 10*3/MM3 (ref 0.1–0.9)
MONOCYTES NFR BLD AUTO: 9.7 % (ref 5–12)
NEUTROPHILS NFR BLD AUTO: 18.57 10*3/MM3 (ref 1.7–7)
NEUTROPHILS NFR BLD AUTO: 84.2 % (ref 42.7–76)
NRBC BLD AUTO-RTO: 0 /100 WBC (ref 0–0.2)
PHOSPHATE SERPL-MCNC: 4 MG/DL (ref 2.5–4.5)
PLATELET # BLD AUTO: 297 10*3/MM3 (ref 140–450)
PMV BLD AUTO: 10.8 FL (ref 6–12)
POTASSIUM SERPL-SCNC: 3.6 MMOL/L (ref 3.5–5.2)
PREALB SERPL-MCNC: 32.2 MG/DL (ref 20–40)
PROT SERPL-MCNC: 6.6 G/DL (ref 6–8.5)
RBC # BLD AUTO: 2.29 10*6/MM3 (ref 3.77–5.28)
SODIUM SERPL-SCNC: 138 MMOL/L (ref 136–145)
TRIGL SERPL-MCNC: 143 MG/DL (ref 0–150)
TROPONIN T SERPL-MCNC: 0.11 NG/ML (ref 0–0.03)
TROPONIN T SERPL-MCNC: 0.12 NG/ML (ref 0–0.03)
WBC # BLD AUTO: 22.05 10*3/MM3 (ref 3.4–10.8)

## 2021-09-20 PROCEDURE — 99232 SBSQ HOSP IP/OBS MODERATE 35: CPT | Performed by: CLINICAL NURSE SPECIALIST

## 2021-09-20 PROCEDURE — 25010000002 HEPARIN (PORCINE) PER 1000 UNITS: Performed by: INTERNAL MEDICINE

## 2021-09-20 PROCEDURE — 85025 COMPLETE CBC W/AUTO DIFF WBC: CPT | Performed by: INTERNAL MEDICINE

## 2021-09-20 PROCEDURE — 82962 GLUCOSE BLOOD TEST: CPT

## 2021-09-20 PROCEDURE — 86140 C-REACTIVE PROTEIN: CPT | Performed by: SURGERY

## 2021-09-20 PROCEDURE — 84484 ASSAY OF TROPONIN QUANT: CPT | Performed by: FAMILY MEDICINE

## 2021-09-20 PROCEDURE — 97535 SELF CARE MNGMENT TRAINING: CPT | Performed by: OCCUPATIONAL THERAPIST

## 2021-09-20 PROCEDURE — 84478 ASSAY OF TRIGLYCERIDES: CPT | Performed by: SURGERY

## 2021-09-20 PROCEDURE — 93010 ELECTROCARDIOGRAM REPORT: CPT | Performed by: INTERNAL MEDICINE

## 2021-09-20 PROCEDURE — 74018 RADEX ABDOMEN 1 VIEW: CPT

## 2021-09-20 PROCEDURE — 97110 THERAPEUTIC EXERCISES: CPT

## 2021-09-20 PROCEDURE — 25010000002 IRON SUCROSE PER 1 MG: Performed by: INTERNAL MEDICINE

## 2021-09-20 PROCEDURE — 80053 COMPREHEN METABOLIC PANEL: CPT | Performed by: SURGERY

## 2021-09-20 PROCEDURE — 97110 THERAPEUTIC EXERCISES: CPT | Performed by: OCCUPATIONAL THERAPIST

## 2021-09-20 PROCEDURE — 25010000002 METOCLOPRAMIDE PER 10 MG: Performed by: INTERNAL MEDICINE

## 2021-09-20 PROCEDURE — 93005 ELECTROCARDIOGRAM TRACING: CPT | Performed by: FAMILY MEDICINE

## 2021-09-20 PROCEDURE — 71275 CT ANGIOGRAPHY CHEST: CPT

## 2021-09-20 PROCEDURE — 83735 ASSAY OF MAGNESIUM: CPT | Performed by: SURGERY

## 2021-09-20 PROCEDURE — 0 IOPAMIDOL PER 1 ML: Performed by: INTERNAL MEDICINE

## 2021-09-20 PROCEDURE — 25010000002 MEROPENEM PER 100 MG: Performed by: INTERNAL MEDICINE

## 2021-09-20 PROCEDURE — 25010000002 VANCOMYCIN 1 G RECONSTITUTED SOLUTION 1 EACH VIAL: Performed by: INTERNAL MEDICINE

## 2021-09-20 PROCEDURE — 25010000002 MORPHINE SULFATE (PF) 2 MG/ML SOLUTION: Performed by: FAMILY MEDICINE

## 2021-09-20 PROCEDURE — 74176 CT ABD & PELVIS W/O CONTRAST: CPT

## 2021-09-20 PROCEDURE — 25010000002 EPOETIN ALFA-EPBX 10000 UNIT/ML SOLUTION: Performed by: SURGERY

## 2021-09-20 PROCEDURE — 84134 ASSAY OF PREALBUMIN: CPT | Performed by: SURGERY

## 2021-09-20 PROCEDURE — 82330 ASSAY OF CALCIUM: CPT

## 2021-09-20 PROCEDURE — 84100 ASSAY OF PHOSPHORUS: CPT | Performed by: SURGERY

## 2021-09-20 RX ORDER — HEPARIN SODIUM 1000 [USP'U]/ML
4000 INJECTION, SOLUTION INTRAVENOUS; SUBCUTANEOUS ONCE
Status: COMPLETED | OUTPATIENT
Start: 2021-09-20 | End: 2021-09-20

## 2021-09-20 RX ORDER — MORPHINE SULFATE 2 MG/ML
2 INJECTION, SOLUTION INTRAMUSCULAR; INTRAVENOUS
Status: DISCONTINUED | OUTPATIENT
Start: 2021-09-20 | End: 2021-09-21

## 2021-09-20 RX ORDER — METOCLOPRAMIDE HYDROCHLORIDE 5 MG/ML
10 INJECTION INTRAMUSCULAR; INTRAVENOUS EVERY 12 HOURS
Status: DISCONTINUED | OUTPATIENT
Start: 2021-09-20 | End: 2021-09-24 | Stop reason: HOSPADM

## 2021-09-20 RX ADMIN — VANCOMYCIN HYDROCHLORIDE 1000 MG: 1 INJECTION, POWDER, LYOPHILIZED, FOR SOLUTION INTRAVENOUS at 18:22

## 2021-09-20 RX ADMIN — DOCUSATE SODIUM 50 MG AND SENNOSIDES 8.6 MG 1 TABLET: 8.6; 5 TABLET, FILM COATED ORAL at 11:11

## 2021-09-20 RX ADMIN — MEROPENEM 500 MG: 500 INJECTION, POWDER, FOR SOLUTION INTRAVENOUS at 18:30

## 2021-09-20 RX ADMIN — Medication 1000 UNITS: at 11:11

## 2021-09-20 RX ADMIN — HEPARIN SODIUM 4000 UNITS: 1000 INJECTION, SOLUTION INTRAVENOUS; SUBCUTANEOUS at 17:46

## 2021-09-20 RX ADMIN — SODIUM CHLORIDE, PRESERVATIVE FREE 10 ML: 5 INJECTION INTRAVENOUS at 11:12

## 2021-09-20 RX ADMIN — IRON SUCROSE 200 MG: 20 INJECTION, SOLUTION INTRAVENOUS at 15:29

## 2021-09-20 RX ADMIN — MORPHINE SULFATE 2 MG: 2 INJECTION, SOLUTION INTRAMUSCULAR; INTRAVENOUS at 19:30

## 2021-09-20 RX ADMIN — BISACODYL 10 MG: 10 SUPPOSITORY RECTAL at 11:25

## 2021-09-20 RX ADMIN — METOCLOPRAMIDE HYDROCHLORIDE 10 MG: 5 INJECTION INTRAMUSCULAR; INTRAVENOUS at 11:12

## 2021-09-20 RX ADMIN — MORPHINE SULFATE 2 MG: 2 INJECTION, SOLUTION INTRAMUSCULAR; INTRAVENOUS at 02:32

## 2021-09-20 RX ADMIN — METOCLOPRAMIDE 10 MG: 5 INJECTION, SOLUTION INTRAMUSCULAR; INTRAVENOUS at 22:16

## 2021-09-20 RX ADMIN — IOPAMIDOL 100 ML: 755 INJECTION, SOLUTION INTRAVENOUS at 00:59

## 2021-09-20 RX ADMIN — MORPHINE SULFATE 2 MG: 2 INJECTION, SOLUTION INTRAMUSCULAR; INTRAVENOUS at 05:20

## 2021-09-20 RX ADMIN — MORPHINE SULFATE 2 MG: 2 INJECTION, SOLUTION INTRAMUSCULAR; INTRAVENOUS at 15:52

## 2021-09-20 RX ADMIN — ACETAMINOPHEN 650 MG: 650 SUPPOSITORY RECTAL at 01:12

## 2021-09-20 RX ADMIN — OXYCODONE HYDROCHLORIDE AND ACETAMINOPHEN 500 MG: 500 TABLET ORAL at 11:11

## 2021-09-20 RX ADMIN — DOCUSATE SODIUM 50 MG AND SENNOSIDES 8.6 MG 1 TABLET: 8.6; 5 TABLET, FILM COATED ORAL at 20:35

## 2021-09-20 RX ADMIN — CHLORHEXIDINE GLUCONATE 0.12% ORAL RINSE 15 ML: 1.2 LIQUID ORAL at 20:35

## 2021-09-20 RX ADMIN — CHLORHEXIDINE GLUCONATE 0.12% ORAL RINSE 15 ML: 1.2 LIQUID ORAL at 11:10

## 2021-09-20 RX ADMIN — METOCLOPRAMIDE HYDROCHLORIDE 10 MG: 5 INJECTION INTRAMUSCULAR; INTRAVENOUS at 05:20

## 2021-09-20 RX ADMIN — EPOETIN ALFA-EPBX 10000 UNITS: 10000 INJECTION, SOLUTION INTRAVENOUS; SUBCUTANEOUS at 11:12

## 2021-09-20 RX ADMIN — METOCLOPRAMIDE HYDROCHLORIDE 10 MG: 5 INJECTION INTRAMUSCULAR; INTRAVENOUS at 01:54

## 2021-09-20 RX ADMIN — MORPHINE SULFATE 2 MG: 2 INJECTION, SOLUTION INTRAMUSCULAR; INTRAVENOUS at 22:16

## 2021-09-20 RX ADMIN — ZINC SULFATE 220 MG (50 MG) CAPSULE 220 MG: CAPSULE at 11:11

## 2021-09-20 RX ADMIN — SODIUM CHLORIDE, PRESERVATIVE FREE 10 ML: 5 INJECTION INTRAVENOUS at 20:35

## 2021-09-21 LAB
ABO GROUP BLD: NORMAL
ALBUMIN SERPL-MCNC: 2.7 G/DL (ref 3.5–5.2)
ALBUMIN/GLOB SERPL: 0.8 G/DL
ALP SERPL-CCNC: 93 U/L (ref 39–117)
ALT SERPL W P-5'-P-CCNC: 19 U/L (ref 1–33)
ANION GAP SERPL CALCULATED.3IONS-SCNC: 14 MMOL/L (ref 5–15)
ANISOCYTOSIS BLD QL: ABNORMAL
AST SERPL-CCNC: 23 U/L (ref 1–32)
BILIRUB SERPL-MCNC: 0.8 MG/DL (ref 0–1.2)
BLD GP AB SCN SERPL QL: NEGATIVE
BUN SERPL-MCNC: 36 MG/DL (ref 6–20)
BUN/CREAT SERPL: 15.7 (ref 7–25)
CALCIUM SPEC-SCNC: 8.5 MG/DL (ref 8.6–10.5)
CHLORIDE SERPL-SCNC: 98 MMOL/L (ref 98–107)
CO2 SERPL-SCNC: 21 MMOL/L (ref 22–29)
CREAT SERPL-MCNC: 2.3 MG/DL (ref 0.57–1)
DEPRECATED RDW RBC AUTO: 66.2 FL (ref 37–54)
EOSINOPHIL # BLD MANUAL: 0.2 10*3/MM3 (ref 0–0.4)
EOSINOPHIL NFR BLD MANUAL: 1 % (ref 0.3–6.2)
ERYTHROCYTE [DISTWIDTH] IN BLOOD BY AUTOMATED COUNT: 18.5 % (ref 12.3–15.4)
GFR SERPL CREATININE-BSD FRML MDRD: 23 ML/MIN/1.73
GLOBULIN UR ELPH-MCNC: 3.6 GM/DL
GLUCOSE BLDC GLUCOMTR-MCNC: 102 MG/DL (ref 70–130)
GLUCOSE BLDC GLUCOMTR-MCNC: 114 MG/DL (ref 70–130)
GLUCOSE BLDC GLUCOMTR-MCNC: 132 MG/DL (ref 70–130)
GLUCOSE BLDC GLUCOMTR-MCNC: 134 MG/DL (ref 70–130)
GLUCOSE SERPL-MCNC: 109 MG/DL (ref 65–99)
HCT VFR BLD AUTO: 21.2 % (ref 34–46.6)
HGB BLD-MCNC: 6.8 G/DL (ref 12–15.9)
LYMPHOCYTES # BLD MANUAL: 1.99 10*3/MM3 (ref 0.7–3.1)
LYMPHOCYTES NFR BLD MANUAL: 10.2 % (ref 19.6–45.3)
LYMPHOCYTES NFR BLD MANUAL: 5.1 % (ref 5–12)
MACROCYTES BLD QL SMEAR: ABNORMAL
MCH RBC QN AUTO: 32.4 PG (ref 26.6–33)
MCHC RBC AUTO-ENTMCNC: 32.1 G/DL (ref 31.5–35.7)
MCV RBC AUTO: 101 FL (ref 79–97)
MONOCYTES # BLD AUTO: 1 10*3/MM3 (ref 0.1–0.9)
NEUTROPHILS # BLD AUTO: 16.35 10*3/MM3 (ref 1.7–7)
NEUTROPHILS NFR BLD MANUAL: 83.7 % (ref 42.7–76)
PLAT MORPH BLD: NORMAL
PLATELET # BLD AUTO: 299 10*3/MM3 (ref 140–450)
PMV BLD AUTO: 11.1 FL (ref 6–12)
POLYCHROMASIA BLD QL SMEAR: ABNORMAL
POTASSIUM SERPL-SCNC: 3.8 MMOL/L (ref 3.5–5.2)
PROT SERPL-MCNC: 6.3 G/DL (ref 6–8.5)
QT INTERVAL: 280 MS
QT INTERVAL: 346 MS
QTC INTERVAL: 432 MS
QTC INTERVAL: 537 MS
RBC # BLD AUTO: 2.1 10*6/MM3 (ref 3.77–5.28)
RH BLD: POSITIVE
SODIUM SERPL-SCNC: 133 MMOL/L (ref 136–145)
T&S EXPIRATION DATE: NORMAL
WBC # BLD AUTO: 19.54 10*3/MM3 (ref 3.4–10.8)
WBC MORPH BLD: NORMAL

## 2021-09-21 PROCEDURE — 82962 GLUCOSE BLOOD TEST: CPT

## 2021-09-21 PROCEDURE — 85025 COMPLETE CBC W/AUTO DIFF WBC: CPT | Performed by: INTERNAL MEDICINE

## 2021-09-21 PROCEDURE — P9016 RBC LEUKOCYTES REDUCED: HCPCS

## 2021-09-21 PROCEDURE — 85007 BL SMEAR W/DIFF WBC COUNT: CPT | Performed by: INTERNAL MEDICINE

## 2021-09-21 PROCEDURE — 25010000002 FLUCONAZOLE PER 200 MG: Performed by: INTERNAL MEDICINE

## 2021-09-21 PROCEDURE — 25010000002 METOCLOPRAMIDE PER 10 MG: Performed by: INTERNAL MEDICINE

## 2021-09-21 PROCEDURE — 86900 BLOOD TYPING SEROLOGIC ABO: CPT

## 2021-09-21 PROCEDURE — 25010000002 MORPHINE SULFATE (PF) 2 MG/ML SOLUTION: Performed by: FAMILY MEDICINE

## 2021-09-21 PROCEDURE — 86901 BLOOD TYPING SEROLOGIC RH(D): CPT | Performed by: INTERNAL MEDICINE

## 2021-09-21 PROCEDURE — 36430 TRANSFUSION BLD/BLD COMPNT: CPT

## 2021-09-21 PROCEDURE — 97535 SELF CARE MNGMENT TRAINING: CPT | Performed by: OCCUPATIONAL THERAPIST

## 2021-09-21 PROCEDURE — 92610 EVALUATE SWALLOWING FUNCTION: CPT | Performed by: SPEECH-LANGUAGE PATHOLOGIST

## 2021-09-21 PROCEDURE — 80053 COMPREHEN METABOLIC PANEL: CPT | Performed by: SURGERY

## 2021-09-21 PROCEDURE — 94799 UNLISTED PULMONARY SVC/PX: CPT

## 2021-09-21 PROCEDURE — 86900 BLOOD TYPING SEROLOGIC ABO: CPT | Performed by: INTERNAL MEDICINE

## 2021-09-21 PROCEDURE — 86923 COMPATIBILITY TEST ELECTRIC: CPT

## 2021-09-21 PROCEDURE — 97530 THERAPEUTIC ACTIVITIES: CPT | Performed by: OCCUPATIONAL THERAPIST

## 2021-09-21 PROCEDURE — 25010000002 MORPHINE SULFATE (PF) 2 MG/ML SOLUTION: Performed by: INTERNAL MEDICINE

## 2021-09-21 PROCEDURE — 25010000002 MEROPENEM PER 100 MG: Performed by: INTERNAL MEDICINE

## 2021-09-21 PROCEDURE — 86850 RBC ANTIBODY SCREEN: CPT | Performed by: INTERNAL MEDICINE

## 2021-09-21 PROCEDURE — 97110 THERAPEUTIC EXERCISES: CPT

## 2021-09-21 RX ORDER — MORPHINE SULFATE 2 MG/ML
1 INJECTION, SOLUTION INTRAMUSCULAR; INTRAVENOUS
Status: DISCONTINUED | OUTPATIENT
Start: 2021-09-21 | End: 2021-09-24 | Stop reason: HOSPADM

## 2021-09-21 RX ORDER — PANTOPRAZOLE SODIUM 40 MG/1
40 TABLET, DELAYED RELEASE ORAL
Status: DISCONTINUED | OUTPATIENT
Start: 2021-09-21 | End: 2021-09-24 | Stop reason: HOSPADM

## 2021-09-21 RX ORDER — FLUCONAZOLE 2 MG/ML
200 INJECTION, SOLUTION INTRAVENOUS DAILY
Status: DISCONTINUED | OUTPATIENT
Start: 2021-09-21 | End: 2021-09-24 | Stop reason: HOSPADM

## 2021-09-21 RX ADMIN — METOCLOPRAMIDE 10 MG: 5 INJECTION, SOLUTION INTRAMUSCULAR; INTRAVENOUS at 10:08

## 2021-09-21 RX ADMIN — MORPHINE SULFATE 1 MG: 2 INJECTION, SOLUTION INTRAMUSCULAR; INTRAVENOUS at 23:12

## 2021-09-21 RX ADMIN — SODIUM CHLORIDE, PRESERVATIVE FREE 10 ML: 5 INJECTION INTRAVENOUS at 22:17

## 2021-09-21 RX ADMIN — SODIUM CHLORIDE, PRESERVATIVE FREE 10 ML: 5 INJECTION INTRAVENOUS at 19:35

## 2021-09-21 RX ADMIN — MORPHINE SULFATE 1 MG: 2 INJECTION, SOLUTION INTRAMUSCULAR; INTRAVENOUS at 19:34

## 2021-09-21 RX ADMIN — DOCUSATE SODIUM 50 MG AND SENNOSIDES 8.6 MG 1 TABLET: 8.6; 5 TABLET, FILM COATED ORAL at 08:17

## 2021-09-21 RX ADMIN — CHLORHEXIDINE GLUCONATE 0.12% ORAL RINSE 15 ML: 1.2 LIQUID ORAL at 22:17

## 2021-09-21 RX ADMIN — MORPHINE SULFATE 2 MG: 2 INJECTION, SOLUTION INTRAMUSCULAR; INTRAVENOUS at 11:33

## 2021-09-21 RX ADMIN — MORPHINE SULFATE 2 MG: 2 INJECTION, SOLUTION INTRAMUSCULAR; INTRAVENOUS at 13:58

## 2021-09-21 RX ADMIN — SODIUM CHLORIDE, PRESERVATIVE FREE 10 ML: 5 INJECTION INTRAVENOUS at 23:15

## 2021-09-21 RX ADMIN — MORPHINE SULFATE 2 MG: 2 INJECTION, SOLUTION INTRAMUSCULAR; INTRAVENOUS at 12:52

## 2021-09-21 RX ADMIN — MORPHINE SULFATE 1 MG: 2 INJECTION, SOLUTION INTRAMUSCULAR; INTRAVENOUS at 15:50

## 2021-09-21 RX ADMIN — SODIUM CHLORIDE, PRESERVATIVE FREE 10 ML: 5 INJECTION INTRAVENOUS at 08:18

## 2021-09-21 RX ADMIN — MEROPENEM 500 MG: 500 INJECTION, POWDER, FOR SOLUTION INTRAVENOUS at 19:57

## 2021-09-21 RX ADMIN — ACETAMINOPHEN 1000 MG: 500 TABLET, FILM COATED ORAL at 00:40

## 2021-09-21 RX ADMIN — OXYCODONE HYDROCHLORIDE AND ACETAMINOPHEN 500 MG: 500 TABLET ORAL at 08:17

## 2021-09-21 RX ADMIN — ZINC SULFATE 220 MG (50 MG) CAPSULE 220 MG: CAPSULE at 08:17

## 2021-09-21 RX ADMIN — Medication 1000 UNITS: at 08:17

## 2021-09-21 RX ADMIN — MORPHINE SULFATE 2 MG: 2 INJECTION, SOLUTION INTRAMUSCULAR; INTRAVENOUS at 04:20

## 2021-09-21 RX ADMIN — CHLORHEXIDINE GLUCONATE 0.12% ORAL RINSE 15 ML: 1.2 LIQUID ORAL at 08:17

## 2021-09-21 RX ADMIN — BISACODYL 10 MG: 10 SUPPOSITORY RECTAL at 08:17

## 2021-09-21 RX ADMIN — MORPHINE SULFATE 2 MG: 2 INJECTION, SOLUTION INTRAMUSCULAR; INTRAVENOUS at 06:03

## 2021-09-21 RX ADMIN — METOCLOPRAMIDE 10 MG: 5 INJECTION, SOLUTION INTRAMUSCULAR; INTRAVENOUS at 23:12

## 2021-09-21 RX ADMIN — MORPHINE SULFATE 2 MG: 2 INJECTION, SOLUTION INTRAMUSCULAR; INTRAVENOUS at 00:41

## 2021-09-21 RX ADMIN — SODIUM CHLORIDE, PRESERVATIVE FREE 10 ML: 5 INJECTION INTRAVENOUS at 19:57

## 2021-09-21 RX ADMIN — MORPHINE SULFATE 2 MG: 2 INJECTION, SOLUTION INTRAMUSCULAR; INTRAVENOUS at 10:08

## 2021-09-21 RX ADMIN — MORPHINE SULFATE 2 MG: 2 INJECTION, SOLUTION INTRAMUSCULAR; INTRAVENOUS at 03:14

## 2021-09-21 RX ADMIN — MORPHINE SULFATE 2 MG: 2 INJECTION, SOLUTION INTRAMUSCULAR; INTRAVENOUS at 08:07

## 2021-09-21 RX ADMIN — PANTOPRAZOLE SODIUM 40 MG: 40 TABLET, DELAYED RELEASE ORAL at 15:50

## 2021-09-21 RX ADMIN — FLUCONAZOLE IN SODIUM CHLORIDE 200 MG: 2 INJECTION, SOLUTION INTRAVENOUS at 10:08

## 2021-09-21 RX ADMIN — DOCUSATE SODIUM 50 MG AND SENNOSIDES 8.6 MG 1 TABLET: 8.6; 5 TABLET, FILM COATED ORAL at 22:17

## 2021-09-22 ENCOUNTER — APPOINTMENT (OUTPATIENT)
Dept: ULTRASOUND IMAGING | Facility: HOSPITAL | Age: 46
End: 2021-09-22

## 2021-09-22 LAB
ALBUMIN SERPL-MCNC: 3.1 G/DL (ref 3.5–5.2)
ALBUMIN/GLOB SERPL: 0.9 G/DL
ALP SERPL-CCNC: 116 U/L (ref 39–117)
ALT SERPL W P-5'-P-CCNC: 17 U/L (ref 1–33)
ANION GAP SERPL CALCULATED.3IONS-SCNC: 13 MMOL/L (ref 5–15)
AST SERPL-CCNC: 21 U/L (ref 1–32)
BASOPHILS # BLD AUTO: 0.11 10*3/MM3 (ref 0–0.2)
BASOPHILS NFR BLD AUTO: 0.6 % (ref 0–1.5)
BH BB BLOOD EXPIRATION DATE: NORMAL
BH BB BLOOD TYPE BARCODE: 6200
BH BB DISPENSE STATUS: NORMAL
BH BB PRODUCT CODE: NORMAL
BH BB UNIT NUMBER: NORMAL
BILIRUB SERPL-MCNC: 1 MG/DL (ref 0–1.2)
BUN SERPL-MCNC: 51 MG/DL (ref 6–20)
BUN/CREAT SERPL: 16.7 (ref 7–25)
CALCIUM SPEC-SCNC: 9.1 MG/DL (ref 8.6–10.5)
CATHETER CULTURE: NORMAL
CHLORIDE SERPL-SCNC: 97 MMOL/L (ref 98–107)
CO2 SERPL-SCNC: 25 MMOL/L (ref 22–29)
CREAT SERPL-MCNC: 3.05 MG/DL (ref 0.57–1)
CROSSMATCH INTERPRETATION: NORMAL
DEPRECATED RDW RBC AUTO: 65.7 FL (ref 37–54)
EOSINOPHIL # BLD AUTO: 0.34 10*3/MM3 (ref 0–0.4)
EOSINOPHIL NFR BLD AUTO: 1.9 % (ref 0.3–6.2)
ERYTHROCYTE [DISTWIDTH] IN BLOOD BY AUTOMATED COUNT: 18.4 % (ref 12.3–15.4)
GFR SERPL CREATININE-BSD FRML MDRD: 17 ML/MIN/1.73
GLOBULIN UR ELPH-MCNC: 3.4 GM/DL
GLUCOSE BLDC GLUCOMTR-MCNC: 104 MG/DL (ref 70–130)
GLUCOSE BLDC GLUCOMTR-MCNC: 109 MG/DL (ref 70–130)
GLUCOSE BLDC GLUCOMTR-MCNC: 115 MG/DL (ref 70–130)
GLUCOSE BLDC GLUCOMTR-MCNC: 97 MG/DL (ref 70–130)
GLUCOSE SERPL-MCNC: 104 MG/DL (ref 65–99)
HCT VFR BLD AUTO: 26 % (ref 34–46.6)
HGB BLD-MCNC: 8.3 G/DL (ref 12–15.9)
IMM GRANULOCYTES # BLD AUTO: 0.34 10*3/MM3 (ref 0–0.05)
IMM GRANULOCYTES NFR BLD AUTO: 1.9 % (ref 0–0.5)
LYMPHOCYTES # BLD AUTO: 0.89 10*3/MM3 (ref 0.7–3.1)
LYMPHOCYTES NFR BLD AUTO: 5.1 % (ref 19.6–45.3)
MCH RBC QN AUTO: 32 PG (ref 26.6–33)
MCHC RBC AUTO-ENTMCNC: 31.9 G/DL (ref 31.5–35.7)
MCV RBC AUTO: 100.4 FL (ref 79–97)
MONOCYTES # BLD AUTO: 1.5 10*3/MM3 (ref 0.1–0.9)
MONOCYTES NFR BLD AUTO: 8.6 % (ref 5–12)
NEUTROPHILS NFR BLD AUTO: 14.26 10*3/MM3 (ref 1.7–7)
NEUTROPHILS NFR BLD AUTO: 81.9 % (ref 42.7–76)
NRBC BLD AUTO-RTO: 0.1 /100 WBC (ref 0–0.2)
PLATELET # BLD AUTO: 347 10*3/MM3 (ref 140–450)
PMV BLD AUTO: 10.1 FL (ref 6–12)
POTASSIUM SERPL-SCNC: 3.8 MMOL/L (ref 3.5–5.2)
PROT SERPL-MCNC: 6.5 G/DL (ref 6–8.5)
RBC # BLD AUTO: 2.59 10*6/MM3 (ref 3.77–5.28)
SODIUM SERPL-SCNC: 135 MMOL/L (ref 136–145)
T4 FREE SERPL-MCNC: 1.26 NG/DL (ref 0.93–1.7)
TSH SERPL DL<=0.05 MIU/L-ACNC: 5.84 UIU/ML (ref 0.27–4.2)
UNIT  ABO: NORMAL
UNIT  RH: NORMAL
VANCOMYCIN SERPL-MCNC: 17.9 MCG/ML (ref 5–40)
WBC # BLD AUTO: 17.44 10*3/MM3 (ref 3.4–10.8)

## 2021-09-22 PROCEDURE — 93010 ELECTROCARDIOGRAM REPORT: CPT | Performed by: INTERNAL MEDICINE

## 2021-09-22 PROCEDURE — 93970 EXTREMITY STUDY: CPT | Performed by: SURGERY

## 2021-09-22 PROCEDURE — 80202 ASSAY OF VANCOMYCIN: CPT | Performed by: INTERNAL MEDICINE

## 2021-09-22 PROCEDURE — 25010000002 VANCOMYCIN 1 G RECONSTITUTED SOLUTION 1 EACH VIAL: Performed by: INTERNAL MEDICINE

## 2021-09-22 PROCEDURE — 85025 COMPLETE CBC W/AUTO DIFF WBC: CPT | Performed by: INTERNAL MEDICINE

## 2021-09-22 PROCEDURE — 25010000002 EPOETIN ALFA-EPBX 10000 UNIT/ML SOLUTION: Performed by: SURGERY

## 2021-09-22 PROCEDURE — 25010000002 MORPHINE SULFATE (PF) 2 MG/ML SOLUTION: Performed by: INTERNAL MEDICINE

## 2021-09-22 PROCEDURE — 80053 COMPREHEN METABOLIC PANEL: CPT | Performed by: SURGERY

## 2021-09-22 PROCEDURE — 25010000002 METOCLOPRAMIDE PER 10 MG: Performed by: INTERNAL MEDICINE

## 2021-09-22 PROCEDURE — 93005 ELECTROCARDIOGRAM TRACING: CPT | Performed by: INTERNAL MEDICINE

## 2021-09-22 PROCEDURE — 82962 GLUCOSE BLOOD TEST: CPT

## 2021-09-22 PROCEDURE — 84443 ASSAY THYROID STIM HORMONE: CPT | Performed by: INTERNAL MEDICINE

## 2021-09-22 PROCEDURE — 25010000002 HEPARIN (PORCINE) PER 1000 UNITS: Performed by: INTERNAL MEDICINE

## 2021-09-22 PROCEDURE — 97168 OT RE-EVAL EST PLAN CARE: CPT | Performed by: OCCUPATIONAL THERAPIST

## 2021-09-22 PROCEDURE — 25010000002 FLUCONAZOLE PER 200 MG: Performed by: INTERNAL MEDICINE

## 2021-09-22 PROCEDURE — 97535 SELF CARE MNGMENT TRAINING: CPT | Performed by: OCCUPATIONAL THERAPIST

## 2021-09-22 PROCEDURE — 84439 ASSAY OF FREE THYROXINE: CPT | Performed by: INTERNAL MEDICINE

## 2021-09-22 PROCEDURE — 93970 EXTREMITY STUDY: CPT

## 2021-09-22 PROCEDURE — 25010000002 ONDANSETRON PER 1 MG: Performed by: SURGERY

## 2021-09-22 PROCEDURE — 25010000002 MEROPENEM PER 100 MG: Performed by: INTERNAL MEDICINE

## 2021-09-22 PROCEDURE — 92526 ORAL FUNCTION THERAPY: CPT | Performed by: SPEECH-LANGUAGE PATHOLOGIST

## 2021-09-22 PROCEDURE — 97530 THERAPEUTIC ACTIVITIES: CPT

## 2021-09-22 RX ORDER — LIDOCAINE 50 MG/G
1 PATCH TOPICAL
Status: DISCONTINUED | OUTPATIENT
Start: 2021-09-22 | End: 2021-09-24 | Stop reason: HOSPADM

## 2021-09-22 RX ORDER — HEPARIN SODIUM 1000 [USP'U]/ML
3600 INJECTION, SOLUTION INTRAVENOUS; SUBCUTANEOUS AS NEEDED
Status: DISCONTINUED | OUTPATIENT
Start: 2021-09-22 | End: 2021-09-24 | Stop reason: HOSPADM

## 2021-09-22 RX ORDER — ALPRAZOLAM 0.25 MG/1
0.25 TABLET ORAL 2 TIMES DAILY PRN
Status: DISCONTINUED | OUTPATIENT
Start: 2021-09-22 | End: 2021-09-24 | Stop reason: HOSPADM

## 2021-09-22 RX ADMIN — SODIUM CHLORIDE, PRESERVATIVE FREE 10 ML: 5 INJECTION INTRAVENOUS at 01:09

## 2021-09-22 RX ADMIN — HEPARIN SODIUM 3600 UNITS: 1000 INJECTION, SOLUTION INTRAVENOUS; SUBCUTANEOUS at 13:25

## 2021-09-22 RX ADMIN — METOPROLOL TARTRATE 25 MG: 25 TABLET, FILM COATED ORAL at 20:48

## 2021-09-22 RX ADMIN — DOCUSATE SODIUM 50 MG AND SENNOSIDES 8.6 MG 1 TABLET: 8.6; 5 TABLET, FILM COATED ORAL at 20:48

## 2021-09-22 RX ADMIN — PANTOPRAZOLE SODIUM 40 MG: 40 TABLET, DELAYED RELEASE ORAL at 06:19

## 2021-09-22 RX ADMIN — CHLORHEXIDINE GLUCONATE 0.12% ORAL RINSE 15 ML: 1.2 LIQUID ORAL at 20:48

## 2021-09-22 RX ADMIN — EPOETIN ALFA-EPBX 10000 UNITS: 10000 INJECTION, SOLUTION INTRAVENOUS; SUBCUTANEOUS at 09:07

## 2021-09-22 RX ADMIN — VANCOMYCIN HYDROCHLORIDE 1000 MG: 1 INJECTION, POWDER, LYOPHILIZED, FOR SOLUTION INTRAVENOUS at 15:43

## 2021-09-22 RX ADMIN — LIDOCAINE 1 PATCH: 50 PATCH CUTANEOUS at 11:39

## 2021-09-22 RX ADMIN — ACETAMINOPHEN 1000 MG: 500 TABLET, FILM COATED ORAL at 13:49

## 2021-09-22 RX ADMIN — METOCLOPRAMIDE 10 MG: 5 INJECTION, SOLUTION INTRAMUSCULAR; INTRAVENOUS at 11:36

## 2021-09-22 RX ADMIN — OXYCODONE HYDROCHLORIDE AND ACETAMINOPHEN 500 MG: 500 TABLET ORAL at 08:14

## 2021-09-22 RX ADMIN — ONDANSETRON 4 MG: 2 INJECTION INTRAMUSCULAR; INTRAVENOUS at 01:09

## 2021-09-22 RX ADMIN — SODIUM CHLORIDE, PRESERVATIVE FREE 10 ML: 5 INJECTION INTRAVENOUS at 08:14

## 2021-09-22 RX ADMIN — Medication 1000 UNITS: at 08:14

## 2021-09-22 RX ADMIN — DOCUSATE SODIUM 50 MG AND SENNOSIDES 8.6 MG 1 TABLET: 8.6; 5 TABLET, FILM COATED ORAL at 08:14

## 2021-09-22 RX ADMIN — ZINC SULFATE 220 MG (50 MG) CAPSULE 220 MG: CAPSULE at 08:14

## 2021-09-22 RX ADMIN — METOCLOPRAMIDE 10 MG: 5 INJECTION, SOLUTION INTRAMUSCULAR; INTRAVENOUS at 23:52

## 2021-09-22 RX ADMIN — CHLORHEXIDINE GLUCONATE 0.12% ORAL RINSE 15 ML: 1.2 LIQUID ORAL at 08:14

## 2021-09-22 RX ADMIN — MORPHINE SULFATE 1 MG: 2 INJECTION, SOLUTION INTRAMUSCULAR; INTRAVENOUS at 02:55

## 2021-09-22 RX ADMIN — SODIUM CHLORIDE, PRESERVATIVE FREE 10 ML: 5 INJECTION INTRAVENOUS at 02:55

## 2021-09-22 RX ADMIN — FLUCONAZOLE IN SODIUM CHLORIDE 200 MG: 2 INJECTION, SOLUTION INTRAVENOUS at 08:14

## 2021-09-22 RX ADMIN — ONDANSETRON 4 MG: 2 INJECTION INTRAMUSCULAR; INTRAVENOUS at 08:23

## 2021-09-22 RX ADMIN — MEROPENEM 500 MG: 500 INJECTION, POWDER, FOR SOLUTION INTRAVENOUS at 18:32

## 2021-09-22 RX ADMIN — ALPRAZOLAM 0.25 MG: 0.25 TABLET ORAL at 13:49

## 2021-09-22 RX ADMIN — SODIUM CHLORIDE, PRESERVATIVE FREE 10 ML: 5 INJECTION INTRAVENOUS at 20:49

## 2021-09-23 LAB
ALBUMIN SERPL-MCNC: 3.2 G/DL (ref 3.5–5.2)
ALBUMIN/GLOB SERPL: 1 G/DL
ALP SERPL-CCNC: 98 U/L (ref 39–117)
ALT SERPL W P-5'-P-CCNC: 16 U/L (ref 1–33)
ANION GAP SERPL CALCULATED.3IONS-SCNC: 12 MMOL/L (ref 5–15)
AST SERPL-CCNC: 20 U/L (ref 1–32)
BASOPHILS # BLD AUTO: 0.09 10*3/MM3 (ref 0–0.2)
BASOPHILS NFR BLD AUTO: 0.7 % (ref 0–1.5)
BILIRUB SERPL-MCNC: 0.7 MG/DL (ref 0–1.2)
BUN SERPL-MCNC: 36 MG/DL (ref 6–20)
BUN/CREAT SERPL: 13.6 (ref 7–25)
CALCIUM SPEC-SCNC: 8.9 MG/DL (ref 8.6–10.5)
CHLORIDE SERPL-SCNC: 98 MMOL/L (ref 98–107)
CO2 SERPL-SCNC: 27 MMOL/L (ref 22–29)
CREAT SERPL-MCNC: 2.64 MG/DL (ref 0.57–1)
DEPRECATED RDW RBC AUTO: 65.1 FL (ref 37–54)
EOSINOPHIL # BLD AUTO: 0.31 10*3/MM3 (ref 0–0.4)
EOSINOPHIL NFR BLD AUTO: 2.4 % (ref 0.3–6.2)
ERYTHROCYTE [DISTWIDTH] IN BLOOD BY AUTOMATED COUNT: 17.8 % (ref 12.3–15.4)
GFR SERPL CREATININE-BSD FRML MDRD: 20 ML/MIN/1.73
GLOBULIN UR ELPH-MCNC: 3.1 GM/DL
GLUCOSE BLDC GLUCOMTR-MCNC: 103 MG/DL (ref 70–130)
GLUCOSE BLDC GLUCOMTR-MCNC: 106 MG/DL (ref 70–130)
GLUCOSE BLDC GLUCOMTR-MCNC: 94 MG/DL (ref 70–130)
GLUCOSE BLDC GLUCOMTR-MCNC: 99 MG/DL (ref 70–130)
GLUCOSE SERPL-MCNC: 114 MG/DL (ref 65–99)
HCT VFR BLD AUTO: 25.3 % (ref 34–46.6)
HGB BLD-MCNC: 7.9 G/DL (ref 12–15.9)
IMM GRANULOCYTES # BLD AUTO: 0.28 10*3/MM3 (ref 0–0.05)
IMM GRANULOCYTES NFR BLD AUTO: 2.1 % (ref 0–0.5)
LYMPHOCYTES # BLD AUTO: 1 10*3/MM3 (ref 0.7–3.1)
LYMPHOCYTES NFR BLD AUTO: 7.7 % (ref 19.6–45.3)
MCH RBC QN AUTO: 31.9 PG (ref 26.6–33)
MCHC RBC AUTO-ENTMCNC: 31.2 G/DL (ref 31.5–35.7)
MCV RBC AUTO: 102 FL (ref 79–97)
MONOCYTES # BLD AUTO: 1.36 10*3/MM3 (ref 0.1–0.9)
MONOCYTES NFR BLD AUTO: 10.4 % (ref 5–12)
NEUTROPHILS NFR BLD AUTO: 10 10*3/MM3 (ref 1.7–7)
NEUTROPHILS NFR BLD AUTO: 76.7 % (ref 42.7–76)
NRBC BLD AUTO-RTO: 0 /100 WBC (ref 0–0.2)
PLATELET # BLD AUTO: 348 10*3/MM3 (ref 140–450)
PMV BLD AUTO: 10.3 FL (ref 6–12)
POTASSIUM SERPL-SCNC: 3.4 MMOL/L (ref 3.5–5.2)
PROT SERPL-MCNC: 6.3 G/DL (ref 6–8.5)
RBC # BLD AUTO: 2.48 10*6/MM3 (ref 3.77–5.28)
SODIUM SERPL-SCNC: 137 MMOL/L (ref 136–145)
WBC # BLD AUTO: 13.04 10*3/MM3 (ref 3.4–10.8)

## 2021-09-23 PROCEDURE — 25010000002 FLUCONAZOLE PER 200 MG: Performed by: INTERNAL MEDICINE

## 2021-09-23 PROCEDURE — 97530 THERAPEUTIC ACTIVITIES: CPT

## 2021-09-23 PROCEDURE — 25010000002 MEROPENEM PER 100 MG: Performed by: INTERNAL MEDICINE

## 2021-09-23 PROCEDURE — 97535 SELF CARE MNGMENT TRAINING: CPT | Performed by: OCCUPATIONAL THERAPIST

## 2021-09-23 PROCEDURE — 97530 THERAPEUTIC ACTIVITIES: CPT | Performed by: OCCUPATIONAL THERAPIST

## 2021-09-23 PROCEDURE — 25010000002 METOCLOPRAMIDE PER 10 MG: Performed by: INTERNAL MEDICINE

## 2021-09-23 PROCEDURE — 80053 COMPREHEN METABOLIC PANEL: CPT | Performed by: SURGERY

## 2021-09-23 PROCEDURE — 85025 COMPLETE CBC W/AUTO DIFF WBC: CPT | Performed by: INTERNAL MEDICINE

## 2021-09-23 PROCEDURE — 92526 ORAL FUNCTION THERAPY: CPT | Performed by: SPEECH-LANGUAGE PATHOLOGIST

## 2021-09-23 PROCEDURE — 82962 GLUCOSE BLOOD TEST: CPT

## 2021-09-23 PROCEDURE — 97164 PT RE-EVAL EST PLAN CARE: CPT

## 2021-09-23 PROCEDURE — 94799 UNLISTED PULMONARY SVC/PX: CPT

## 2021-09-23 RX ORDER — POTASSIUM CHLORIDE 750 MG/1
20 CAPSULE, EXTENDED RELEASE ORAL ONCE
Status: COMPLETED | OUTPATIENT
Start: 2021-09-23 | End: 2021-09-23

## 2021-09-23 RX ORDER — LIDOCAINE 50 MG/G
1 PATCH TOPICAL
Status: DISCONTINUED | OUTPATIENT
Start: 2021-09-23 | End: 2021-09-24 | Stop reason: HOSPADM

## 2021-09-23 RX ADMIN — PANTOPRAZOLE SODIUM 40 MG: 40 TABLET, DELAYED RELEASE ORAL at 06:24

## 2021-09-23 RX ADMIN — ALPRAZOLAM 0.25 MG: 0.25 TABLET ORAL at 02:19

## 2021-09-23 RX ADMIN — FLUCONAZOLE IN SODIUM CHLORIDE 200 MG: 2 INJECTION, SOLUTION INTRAVENOUS at 09:17

## 2021-09-23 RX ADMIN — METOPROLOL TARTRATE 25 MG: 25 TABLET, FILM COATED ORAL at 09:17

## 2021-09-23 RX ADMIN — LIDOCAINE 1 PATCH: 50 PATCH CUTANEOUS at 09:17

## 2021-09-23 RX ADMIN — LIDOCAINE 1 PATCH: 50 PATCH CUTANEOUS at 02:20

## 2021-09-23 RX ADMIN — POTASSIUM CHLORIDE 20 MEQ: 10 CAPSULE, COATED, EXTENDED RELEASE ORAL at 11:29

## 2021-09-23 RX ADMIN — Medication 1000 UNITS: at 09:17

## 2021-09-23 RX ADMIN — METOCLOPRAMIDE 10 MG: 5 INJECTION, SOLUTION INTRAMUSCULAR; INTRAVENOUS at 11:29

## 2021-09-23 RX ADMIN — METOPROLOL TARTRATE 25 MG: 25 TABLET, FILM COATED ORAL at 22:27

## 2021-09-23 RX ADMIN — ZINC SULFATE 220 MG (50 MG) CAPSULE 220 MG: CAPSULE at 09:16

## 2021-09-23 RX ADMIN — CHLORHEXIDINE GLUCONATE 0.12% ORAL RINSE 15 ML: 1.2 LIQUID ORAL at 21:05

## 2021-09-23 RX ADMIN — SODIUM CHLORIDE, PRESERVATIVE FREE 10 ML: 5 INJECTION INTRAVENOUS at 09:19

## 2021-09-23 RX ADMIN — MEROPENEM 500 MG: 500 INJECTION, POWDER, FOR SOLUTION INTRAVENOUS at 21:05

## 2021-09-23 RX ADMIN — SODIUM CHLORIDE, PRESERVATIVE FREE 10 ML: 5 INJECTION INTRAVENOUS at 21:05

## 2021-09-23 RX ADMIN — DOCUSATE SODIUM 50 MG AND SENNOSIDES 8.6 MG 1 TABLET: 8.6; 5 TABLET, FILM COATED ORAL at 09:16

## 2021-09-23 RX ADMIN — OXYCODONE HYDROCHLORIDE AND ACETAMINOPHEN 500 MG: 500 TABLET ORAL at 09:17

## 2021-09-23 RX ADMIN — CHLORHEXIDINE GLUCONATE 0.12% ORAL RINSE 15 ML: 1.2 LIQUID ORAL at 09:16

## 2021-09-24 ENCOUNTER — HOSPITAL ENCOUNTER (OUTPATIENT)
Facility: HOSPITAL | Age: 46
Discharge: HOME HEALTH | End: 2021-10-12
Attending: INTERNAL MEDICINE | Admitting: INTERNAL MEDICINE

## 2021-09-24 VITALS
RESPIRATION RATE: 18 BRPM | SYSTOLIC BLOOD PRESSURE: 116 MMHG | HEIGHT: 67 IN | WEIGHT: 222.66 LBS | TEMPERATURE: 98.8 F | OXYGEN SATURATION: 98 % | HEART RATE: 117 BPM | DIASTOLIC BLOOD PRESSURE: 74 MMHG | BODY MASS INDEX: 34.95 KG/M2

## 2021-09-24 PROBLEM — D89.834 CYTOKINE RELEASE SYNDROME, GRADE 4: Status: ACTIVE | Noted: 2021-09-24

## 2021-09-24 LAB
ALBUMIN SERPL-MCNC: 3.3 G/DL (ref 3.5–5.2)
ALBUMIN/GLOB SERPL: 1 G/DL
ALP SERPL-CCNC: 103 U/L (ref 39–117)
ALT SERPL W P-5'-P-CCNC: 19 U/L (ref 1–33)
ANION GAP SERPL CALCULATED.3IONS-SCNC: 15 MMOL/L (ref 5–15)
AST SERPL-CCNC: 27 U/L (ref 1–32)
BACTERIA SPEC AEROBE CULT: NORMAL
BACTERIA SPEC AEROBE CULT: NORMAL
BASOPHILS # BLD AUTO: 0.09 10*3/MM3 (ref 0–0.2)
BASOPHILS NFR BLD AUTO: 0.7 % (ref 0–1.5)
BILIRUB SERPL-MCNC: 0.7 MG/DL (ref 0–1.2)
BUN SERPL-MCNC: 46 MG/DL (ref 6–20)
BUN/CREAT SERPL: 15.8 (ref 7–25)
CALCIUM SPEC-SCNC: 9.2 MG/DL (ref 8.6–10.5)
CHLORIDE SERPL-SCNC: 99 MMOL/L (ref 98–107)
CO2 SERPL-SCNC: 25 MMOL/L (ref 22–29)
CREAT SERPL-MCNC: 2.91 MG/DL (ref 0.57–1)
DEPRECATED RDW RBC AUTO: 62.1 FL (ref 37–54)
EOSINOPHIL # BLD AUTO: 0.52 10*3/MM3 (ref 0–0.4)
EOSINOPHIL NFR BLD AUTO: 4.1 % (ref 0.3–6.2)
ERYTHROCYTE [DISTWIDTH] IN BLOOD BY AUTOMATED COUNT: 17.2 % (ref 12.3–15.4)
GFR SERPL CREATININE-BSD FRML MDRD: 18 ML/MIN/1.73
GLOBULIN UR ELPH-MCNC: 3.3 GM/DL
GLUCOSE BLDC GLUCOMTR-MCNC: 112 MG/DL (ref 70–130)
GLUCOSE BLDC GLUCOMTR-MCNC: 115 MG/DL (ref 70–130)
GLUCOSE BLDC GLUCOMTR-MCNC: 119 MG/DL (ref 70–130)
GLUCOSE BLDC GLUCOMTR-MCNC: 92 MG/DL (ref 70–130)
GLUCOSE SERPL-MCNC: 110 MG/DL (ref 65–99)
HCT VFR BLD AUTO: 27.8 % (ref 34–46.6)
HGB BLD-MCNC: 8.6 G/DL (ref 12–15.9)
IMM GRANULOCYTES # BLD AUTO: 0.32 10*3/MM3 (ref 0–0.05)
IMM GRANULOCYTES NFR BLD AUTO: 2.6 % (ref 0–0.5)
LYMPHOCYTES # BLD AUTO: 1.08 10*3/MM3 (ref 0.7–3.1)
LYMPHOCYTES NFR BLD AUTO: 8.6 % (ref 19.6–45.3)
MAGNESIUM SERPL-MCNC: 2.1 MG/DL (ref 1.6–2.6)
MCH RBC QN AUTO: 30.9 PG (ref 26.6–33)
MCHC RBC AUTO-ENTMCNC: 30.9 G/DL (ref 31.5–35.7)
MCV RBC AUTO: 100 FL (ref 79–97)
MONOCYTES # BLD AUTO: 1.14 10*3/MM3 (ref 0.1–0.9)
MONOCYTES NFR BLD AUTO: 9.1 % (ref 5–12)
NEUTROPHILS NFR BLD AUTO: 74.9 % (ref 42.7–76)
NEUTROPHILS NFR BLD AUTO: 9.39 10*3/MM3 (ref 1.7–7)
NRBC BLD AUTO-RTO: 0.3 /100 WBC (ref 0–0.2)
PLATELET # BLD AUTO: 382 10*3/MM3 (ref 140–450)
PMV BLD AUTO: 9.9 FL (ref 6–12)
POTASSIUM SERPL-SCNC: 3.2 MMOL/L (ref 3.5–5.2)
PROT SERPL-MCNC: 6.6 G/DL (ref 6–8.5)
RBC # BLD AUTO: 2.78 10*6/MM3 (ref 3.77–5.28)
SODIUM SERPL-SCNC: 139 MMOL/L (ref 136–145)
VANCOMYCIN SERPL-MCNC: 19.4 MCG/ML (ref 5–40)
WBC # BLD AUTO: 12.54 10*3/MM3 (ref 3.4–10.8)

## 2021-09-24 PROCEDURE — 83735 ASSAY OF MAGNESIUM: CPT | Performed by: INTERNAL MEDICINE

## 2021-09-24 PROCEDURE — 25010000002 METOCLOPRAMIDE PER 10 MG: Performed by: INTERNAL MEDICINE

## 2021-09-24 PROCEDURE — 80202 ASSAY OF VANCOMYCIN: CPT | Performed by: INTERNAL MEDICINE

## 2021-09-24 PROCEDURE — 85025 COMPLETE CBC W/AUTO DIFF WBC: CPT | Performed by: INTERNAL MEDICINE

## 2021-09-24 PROCEDURE — 25010000002 MEROPENEM PER 100 MG: Performed by: INTERNAL MEDICINE

## 2021-09-24 PROCEDURE — 25010000002 EPOETIN ALFA-EPBX 10000 UNIT/ML SOLUTION: Performed by: INTERNAL MEDICINE

## 2021-09-24 PROCEDURE — 82962 GLUCOSE BLOOD TEST: CPT

## 2021-09-24 PROCEDURE — 25010000002 FLUCONAZOLE PER 200 MG: Performed by: INTERNAL MEDICINE

## 2021-09-24 PROCEDURE — 80053 COMPREHEN METABOLIC PANEL: CPT | Performed by: INTERNAL MEDICINE

## 2021-09-24 PROCEDURE — 25010000002 HEPARIN (PORCINE) PER 1000 UNITS: Performed by: INTERNAL MEDICINE

## 2021-09-24 PROCEDURE — 25010000002 VANCOMYCIN 1 G RECONSTITUTED SOLUTION 1 EACH VIAL: Performed by: INTERNAL MEDICINE

## 2021-09-24 PROCEDURE — 25010000002 HYDRALAZINE PER 20 MG: Performed by: INTERNAL MEDICINE

## 2021-09-24 RX ORDER — HYDRALAZINE HYDROCHLORIDE 20 MG/ML
10 INJECTION INTRAMUSCULAR; INTRAVENOUS EVERY 6 HOURS PRN
Status: DISCONTINUED | OUTPATIENT
Start: 2021-09-24 | End: 2021-10-12 | Stop reason: HOSPADM

## 2021-09-24 RX ORDER — ONDANSETRON 2 MG/ML
4 INJECTION INTRAMUSCULAR; INTRAVENOUS EVERY 6 HOURS PRN
Status: ON HOLD
Start: 2021-09-24 | End: 2022-04-03

## 2021-09-24 RX ORDER — LIDOCAINE 50 MG/G
1 PATCH TOPICAL
Status: ON HOLD
Start: 2021-09-25 | End: 2022-04-03

## 2021-09-24 RX ORDER — BISACODYL 10 MG
10 SUPPOSITORY, RECTAL RECTAL DAILY
Status: DISCONTINUED | OUTPATIENT
Start: 2021-09-25 | End: 2021-09-29

## 2021-09-24 RX ORDER — MORPHINE SULFATE 2 MG/ML
1 INJECTION, SOLUTION INTRAMUSCULAR; INTRAVENOUS
Status: DISCONTINUED | OUTPATIENT
Start: 2021-09-24 | End: 2021-09-28 | Stop reason: RX

## 2021-09-24 RX ORDER — ACETAMINOPHEN 325 MG/1
650 TABLET ORAL EVERY 6 HOURS PRN
Status: DISCONTINUED | OUTPATIENT
Start: 2021-09-24 | End: 2021-10-12 | Stop reason: HOSPADM

## 2021-09-24 RX ORDER — ONDANSETRON 4 MG/1
4 TABLET, FILM COATED ORAL EVERY 6 HOURS PRN
Status: DISCONTINUED | OUTPATIENT
Start: 2021-09-24 | End: 2021-10-12 | Stop reason: HOSPADM

## 2021-09-24 RX ORDER — DEXTROSE MONOHYDRATE 25 G/50ML
25 INJECTION, SOLUTION INTRAVENOUS
Status: DISCONTINUED | OUTPATIENT
Start: 2021-09-24 | End: 2021-10-12 | Stop reason: HOSPADM

## 2021-09-24 RX ORDER — SODIUM CHLORIDE 0.9 % (FLUSH) 0.9 %
10 SYRINGE (ML) INJECTION AS NEEDED
Status: DISCONTINUED | OUTPATIENT
Start: 2021-09-24 | End: 2021-10-12 | Stop reason: HOSPADM

## 2021-09-24 RX ORDER — HEPARIN SODIUM 1000 [USP'U]/ML
3600 INJECTION, SOLUTION INTRAVENOUS; SUBCUTANEOUS AS NEEDED
Status: DISCONTINUED | OUTPATIENT
Start: 2021-09-24 | End: 2021-10-12 | Stop reason: HOSPADM

## 2021-09-24 RX ORDER — ZINC SULFATE 50(220)MG
220 CAPSULE ORAL DAILY
Status: DISCONTINUED | OUTPATIENT
Start: 2021-09-25 | End: 2021-10-12 | Stop reason: HOSPADM

## 2021-09-24 RX ORDER — CLONIDINE 0.1 MG/24H
1 PATCH, EXTENDED RELEASE TRANSDERMAL WEEKLY
Status: DISCONTINUED | OUTPATIENT
Start: 2021-09-26 | End: 2021-10-12 | Stop reason: HOSPADM

## 2021-09-24 RX ORDER — LABETALOL HYDROCHLORIDE 5 MG/ML
20 INJECTION, SOLUTION INTRAVENOUS EVERY 6 HOURS PRN
Status: DISCONTINUED | OUTPATIENT
Start: 2021-09-24 | End: 2021-10-12 | Stop reason: HOSPADM

## 2021-09-24 RX ORDER — PANTOPRAZOLE SODIUM 40 MG/1
40 TABLET, DELAYED RELEASE ORAL
Status: ON HOLD
Start: 2021-09-25 | End: 2022-04-03

## 2021-09-24 RX ORDER — SODIUM CITRATE 4 % (3 ML)
4 SYRINGE (ML) MISCELLANEOUS AS NEEDED
Status: DISCONTINUED | OUTPATIENT
Start: 2021-09-24 | End: 2021-10-12 | Stop reason: HOSPADM

## 2021-09-24 RX ORDER — SACCHAROMYCES BOULARDII 250 MG
250 CAPSULE ORAL 2 TIMES DAILY
Status: DISCONTINUED | OUTPATIENT
Start: 2021-09-24 | End: 2021-10-12 | Stop reason: HOSPADM

## 2021-09-24 RX ORDER — HEPARIN SODIUM 1000 [USP'U]/ML
3600 INJECTION, SOLUTION INTRAVENOUS; SUBCUTANEOUS AS NEEDED
Status: ON HOLD
Start: 2021-09-24 | End: 2022-04-03

## 2021-09-24 RX ORDER — METOCLOPRAMIDE HYDROCHLORIDE 5 MG/ML
10 INJECTION INTRAMUSCULAR; INTRAVENOUS EVERY 12 HOURS SCHEDULED
Status: DISCONTINUED | OUTPATIENT
Start: 2021-09-24 | End: 2021-09-29

## 2021-09-24 RX ORDER — SODIUM CHLORIDE 0.9 % (FLUSH) 0.9 %
10 SYRINGE (ML) INJECTION EVERY 12 HOURS SCHEDULED
Status: DISCONTINUED | OUTPATIENT
Start: 2021-09-24 | End: 2021-10-12 | Stop reason: HOSPADM

## 2021-09-24 RX ORDER — ONDANSETRON 2 MG/ML
4 INJECTION INTRAMUSCULAR; INTRAVENOUS EVERY 6 HOURS PRN
Status: DISCONTINUED | OUTPATIENT
Start: 2021-09-24 | End: 2021-10-12 | Stop reason: HOSPADM

## 2021-09-24 RX ORDER — MELATONIN
1000 DAILY
Status: DISCONTINUED | OUTPATIENT
Start: 2021-09-25 | End: 2021-10-12 | Stop reason: HOSPADM

## 2021-09-24 RX ORDER — ALPRAZOLAM 0.25 MG/1
0.25 TABLET ORAL 2 TIMES DAILY PRN
Status: DISCONTINUED | OUTPATIENT
Start: 2021-09-24 | End: 2021-09-30

## 2021-09-24 RX ORDER — FLUCONAZOLE 2 MG/ML
200 INJECTION, SOLUTION INTRAVENOUS DAILY
Qty: 1000 ML | Refills: 0
Start: 2021-09-25 | End: 2021-10-05

## 2021-09-24 RX ORDER — AMOXICILLIN 250 MG
1 CAPSULE ORAL 2 TIMES DAILY
Status: ON HOLD
Start: 2021-09-24 | End: 2022-04-03

## 2021-09-24 RX ORDER — SODIUM CHLORIDE 234 MG/ML
10 INJECTION, SOLUTION INTRAVENOUS
Status: DISCONTINUED | OUTPATIENT
Start: 2021-09-24 | End: 2021-10-12 | Stop reason: HOSPADM

## 2021-09-24 RX ORDER — ASCORBIC ACID 500 MG
500 TABLET ORAL DAILY
Status: DISCONTINUED | OUTPATIENT
Start: 2021-09-25 | End: 2021-10-12 | Stop reason: HOSPADM

## 2021-09-24 RX ORDER — SPIRONOLACTONE 50 MG/1
50 TABLET, FILM COATED ORAL DAILY
Status: DISCONTINUED | OUTPATIENT
Start: 2021-09-24 | End: 2021-09-24 | Stop reason: HOSPADM

## 2021-09-24 RX ORDER — ALPRAZOLAM 0.25 MG/1
0.25 TABLET ORAL 2 TIMES DAILY PRN
Start: 2021-09-24 | End: 2021-09-29

## 2021-09-24 RX ORDER — PANTOPRAZOLE SODIUM 40 MG/1
40 TABLET, DELAYED RELEASE ORAL
Status: DISCONTINUED | OUTPATIENT
Start: 2021-09-25 | End: 2021-10-12 | Stop reason: HOSPADM

## 2021-09-24 RX ORDER — ACETAMINOPHEN 650 MG/1
650 SUPPOSITORY RECTAL EVERY 6 HOURS PRN
Status: DISCONTINUED | OUTPATIENT
Start: 2021-09-24 | End: 2021-10-12 | Stop reason: HOSPADM

## 2021-09-24 RX ORDER — DEXTROSE MONOHYDRATE 25 G/50ML
25 INJECTION, SOLUTION INTRAVENOUS
Status: ON HOLD
Start: 2021-09-24 | End: 2022-04-03

## 2021-09-24 RX ORDER — ACETAMINOPHEN 500 MG
1000 TABLET ORAL EVERY 4 HOURS PRN
Status: DISCONTINUED | OUTPATIENT
Start: 2021-09-24 | End: 2021-10-12 | Stop reason: HOSPADM

## 2021-09-24 RX ORDER — LIDOCAINE 50 MG/G
1 PATCH TOPICAL
Status: DISCONTINUED | OUTPATIENT
Start: 2021-09-25 | End: 2021-10-12 | Stop reason: HOSPADM

## 2021-09-24 RX ORDER — AMOXICILLIN 250 MG
1 CAPSULE ORAL 2 TIMES DAILY
Status: DISCONTINUED | OUTPATIENT
Start: 2021-09-24 | End: 2021-09-29

## 2021-09-24 RX ORDER — ACETAMINOPHEN 325 MG/1
650 TABLET ORAL EVERY 6 HOURS PRN
Status: ON HOLD
Start: 2021-09-24 | End: 2022-04-03

## 2021-09-24 RX ORDER — NICOTINE POLACRILEX 4 MG
15 LOZENGE BUCCAL
Status: ON HOLD
Start: 2021-09-24 | End: 2022-04-03

## 2021-09-24 RX ORDER — NICOTINE POLACRILEX 4 MG
15 LOZENGE BUCCAL
Status: DISCONTINUED | OUTPATIENT
Start: 2021-09-24 | End: 2021-10-12 | Stop reason: HOSPADM

## 2021-09-24 RX ORDER — NALOXONE HYDROCHLORIDE 0.4 MG/ML
0.2 INJECTION, SOLUTION INTRAMUSCULAR; INTRAVENOUS; SUBCUTANEOUS AS NEEDED
Status: DISCONTINUED | OUTPATIENT
Start: 2021-09-24 | End: 2021-10-12 | Stop reason: HOSPADM

## 2021-09-24 RX ORDER — FLUCONAZOLE 2 MG/ML
200 INJECTION, SOLUTION INTRAVENOUS DAILY
Status: DISCONTINUED | OUTPATIENT
Start: 2021-09-25 | End: 2021-10-01 | Stop reason: ALTCHOICE

## 2021-09-24 RX ORDER — CLONIDINE 0.1 MG/24H
1 PATCH, EXTENDED RELEASE TRANSDERMAL WEEKLY
Status: ON HOLD
Start: 2021-09-26 | End: 2022-04-03

## 2021-09-24 RX ORDER — BISACODYL 10 MG
10 SUPPOSITORY, RECTAL RECTAL DAILY
Status: ON HOLD
Start: 2021-09-25 | End: 2022-04-03

## 2021-09-24 RX ADMIN — OXYCODONE HYDROCHLORIDE AND ACETAMINOPHEN 500 MG: 500 TABLET ORAL at 11:52

## 2021-09-24 RX ADMIN — HYDRALAZINE HYDROCHLORIDE 10 MG: 20 INJECTION INTRAMUSCULAR; INTRAVENOUS at 01:29

## 2021-09-24 RX ADMIN — METOCLOPRAMIDE 10 MG: 5 INJECTION, SOLUTION INTRAMUSCULAR; INTRAVENOUS at 11:53

## 2021-09-24 RX ADMIN — ACETAMINOPHEN 650 MG: 325 TABLET, FILM COATED ORAL at 08:46

## 2021-09-24 RX ADMIN — DOCUSATE SODIUM 50 MG AND SENNOSIDES 8.6 MG 1 TABLET: 8.6; 5 TABLET, FILM COATED ORAL at 11:52

## 2021-09-24 RX ADMIN — CHLORHEXIDINE GLUCONATE 0.12% ORAL RINSE 15 ML: 1.2 LIQUID ORAL at 11:52

## 2021-09-24 RX ADMIN — PANTOPRAZOLE SODIUM 40 MG: 40 TABLET, DELAYED RELEASE ORAL at 05:16

## 2021-09-24 RX ADMIN — FLUCONAZOLE IN SODIUM CHLORIDE 200 MG: 2 INJECTION, SOLUTION INTRAVENOUS at 11:53

## 2021-09-24 RX ADMIN — SODIUM CHLORIDE, PRESERVATIVE FREE 10 ML: 5 INJECTION INTRAVENOUS at 11:53

## 2021-09-24 RX ADMIN — EPOETIN ALFA-EPBX 10000 UNITS: 10000 INJECTION, SOLUTION INTRAVENOUS; SUBCUTANEOUS at 11:54

## 2021-09-24 RX ADMIN — METOCLOPRAMIDE 10 MG: 5 INJECTION, SOLUTION INTRAMUSCULAR; INTRAVENOUS at 00:27

## 2021-09-24 RX ADMIN — Medication 1000 UNITS: at 11:52

## 2021-09-24 RX ADMIN — LIDOCAINE 1 PATCH: 50 PATCH CUTANEOUS at 11:53

## 2021-09-24 RX ADMIN — ALPRAZOLAM 0.25 MG: 0.25 TABLET ORAL at 00:34

## 2021-09-24 RX ADMIN — SPIRONOLACTONE 50 MG: 50 TABLET, FILM COATED ORAL at 13:35

## 2021-09-24 RX ADMIN — METOPROLOL TARTRATE 25 MG: 25 TABLET, FILM COATED ORAL at 08:46

## 2021-09-24 RX ADMIN — HEPARIN SODIUM 3600 UNITS: 1000 INJECTION, SOLUTION INTRAVENOUS; SUBCUTANEOUS at 11:38

## 2021-09-24 RX ADMIN — ZINC SULFATE 220 MG (50 MG) CAPSULE 220 MG: CAPSULE at 11:52

## 2021-09-24 RX ADMIN — LIDOCAINE 1 PATCH: 50 PATCH CUTANEOUS at 11:54

## 2021-09-25 LAB
ALBUMIN SERPL-MCNC: 3 G/DL (ref 3.5–5.2)
ALBUMIN/GLOB SERPL: 0.9 G/DL
ALP SERPL-CCNC: 98 U/L (ref 39–117)
ALT SERPL W P-5'-P-CCNC: 19 U/L (ref 1–33)
ANION GAP SERPL CALCULATED.3IONS-SCNC: 16 MMOL/L (ref 5–15)
AST SERPL-CCNC: 29 U/L (ref 1–32)
BASOPHILS # BLD AUTO: 0.11 10*3/MM3 (ref 0–0.2)
BASOPHILS NFR BLD AUTO: 1 % (ref 0–1.5)
BILIRUB SERPL-MCNC: 0.6 MG/DL (ref 0–1.2)
BUN SERPL-MCNC: 33 MG/DL (ref 6–20)
BUN/CREAT SERPL: 16.6 (ref 7–25)
CALCIUM SPEC-SCNC: 9.1 MG/DL (ref 8.6–10.5)
CHLORIDE SERPL-SCNC: 100 MMOL/L (ref 98–107)
CO2 SERPL-SCNC: 24 MMOL/L (ref 22–29)
CREAT SERPL-MCNC: 1.99 MG/DL (ref 0.57–1)
DEPRECATED RDW RBC AUTO: 58.4 FL (ref 37–54)
EOSINOPHIL # BLD AUTO: 0.41 10*3/MM3 (ref 0–0.4)
EOSINOPHIL NFR BLD AUTO: 3.7 % (ref 0.3–6.2)
ERYTHROCYTE [DISTWIDTH] IN BLOOD BY AUTOMATED COUNT: 16.5 % (ref 12.3–15.4)
GFR SERPL CREATININE-BSD FRML MDRD: 27 ML/MIN/1.73
GLOBULIN UR ELPH-MCNC: 3.4 GM/DL
GLUCOSE BLDC GLUCOMTR-MCNC: 107 MG/DL (ref 70–130)
GLUCOSE SERPL-MCNC: 105 MG/DL (ref 65–99)
HCT VFR BLD AUTO: 27 % (ref 34–46.6)
HGB BLD-MCNC: 8.4 G/DL (ref 12–15.9)
IMM GRANULOCYTES # BLD AUTO: 0.43 10*3/MM3 (ref 0–0.05)
IMM GRANULOCYTES NFR BLD AUTO: 3.9 % (ref 0–0.5)
LYMPHOCYTES # BLD AUTO: 0.99 10*3/MM3 (ref 0.7–3.1)
LYMPHOCYTES NFR BLD AUTO: 9 % (ref 19.6–45.3)
MCH RBC QN AUTO: 30.7 PG (ref 26.6–33)
MCHC RBC AUTO-ENTMCNC: 31.1 G/DL (ref 31.5–35.7)
MCV RBC AUTO: 98.5 FL (ref 79–97)
MONOCYTES # BLD AUTO: 0.89 10*3/MM3 (ref 0.1–0.9)
MONOCYTES NFR BLD AUTO: 8.1 % (ref 5–12)
NEUTROPHILS NFR BLD AUTO: 74.3 % (ref 42.7–76)
NEUTROPHILS NFR BLD AUTO: 8.2 10*3/MM3 (ref 1.7–7)
NRBC BLD AUTO-RTO: 0.3 /100 WBC (ref 0–0.2)
PLATELET # BLD AUTO: 327 10*3/MM3 (ref 140–450)
PMV BLD AUTO: 9.9 FL (ref 6–12)
POTASSIUM SERPL-SCNC: 2.8 MMOL/L (ref 3.5–5.2)
PREALB SERPL-MCNC: 14.9 MG/DL (ref 20–40)
PROT SERPL-MCNC: 6.4 G/DL (ref 6–8.5)
QT INTERVAL: 280 MS
QTC INTERVAL: 432 MS
RBC # BLD AUTO: 2.74 10*6/MM3 (ref 3.77–5.28)
SODIUM SERPL-SCNC: 140 MMOL/L (ref 136–145)
WBC # BLD AUTO: 11.03 10*3/MM3 (ref 3.4–10.8)

## 2021-09-25 PROCEDURE — 63710000001 ONDANSETRON PER 8 MG: Performed by: INTERNAL MEDICINE

## 2021-09-25 PROCEDURE — 25010000002 ONDANSETRON PER 1 MG: Performed by: INTERNAL MEDICINE

## 2021-09-25 PROCEDURE — 25010000002 FLUCONAZOLE PER 200 MG: Performed by: INTERNAL MEDICINE

## 2021-09-25 PROCEDURE — 25010000002 METOCLOPRAMIDE PER 10 MG: Performed by: INTERNAL MEDICINE

## 2021-09-25 PROCEDURE — 80053 COMPREHEN METABOLIC PANEL: CPT | Performed by: INTERNAL MEDICINE

## 2021-09-25 PROCEDURE — 92610 EVALUATE SWALLOWING FUNCTION: CPT

## 2021-09-25 PROCEDURE — 25010000003 POTASSIUM CHLORIDE 10 MEQ/100ML SOLUTION: Performed by: INTERNAL MEDICINE

## 2021-09-25 PROCEDURE — 82962 GLUCOSE BLOOD TEST: CPT

## 2021-09-25 PROCEDURE — 84134 ASSAY OF PREALBUMIN: CPT | Performed by: INTERNAL MEDICINE

## 2021-09-25 PROCEDURE — 85025 COMPLETE CBC W/AUTO DIFF WBC: CPT | Performed by: INTERNAL MEDICINE

## 2021-09-25 RX ORDER — POTASSIUM CHLORIDE 7.45 MG/ML
10 INJECTION INTRAVENOUS
Status: DISCONTINUED | OUTPATIENT
Start: 2021-09-25 | End: 2021-09-25

## 2021-09-25 RX ORDER — POTASSIUM CHLORIDE 750 MG/1
40 CAPSULE, EXTENDED RELEASE ORAL 2 TIMES DAILY
Status: DISCONTINUED | OUTPATIENT
Start: 2021-09-25 | End: 2021-09-25

## 2021-09-26 LAB
ANION GAP SERPL CALCULATED.3IONS-SCNC: 17 MMOL/L (ref 5–15)
BUN SERPL-MCNC: 37 MG/DL (ref 6–20)
BUN/CREAT SERPL: 19.5 (ref 7–25)
CALCIUM SPEC-SCNC: 8.9 MG/DL (ref 8.6–10.5)
CHLORIDE SERPL-SCNC: 99 MMOL/L (ref 98–107)
CO2 SERPL-SCNC: 23 MMOL/L (ref 22–29)
CREAT SERPL-MCNC: 1.9 MG/DL (ref 0.57–1)
GFR SERPL CREATININE-BSD FRML MDRD: 29 ML/MIN/1.73
GLUCOSE SERPL-MCNC: 97 MG/DL (ref 65–99)
POTASSIUM SERPL-SCNC: 3 MMOL/L (ref 3.5–5.2)
SODIUM SERPL-SCNC: 139 MMOL/L (ref 136–145)

## 2021-09-26 PROCEDURE — 80048 BASIC METABOLIC PNL TOTAL CA: CPT | Performed by: INTERNAL MEDICINE

## 2021-09-26 PROCEDURE — 25010000002 METOCLOPRAMIDE PER 10 MG: Performed by: INTERNAL MEDICINE

## 2021-09-26 PROCEDURE — C1751 CATH, INF, PER/CENT/MIDLINE: HCPCS

## 2021-09-26 PROCEDURE — 25010000003 POTASSIUM CHLORIDE 10 MEQ/100ML SOLUTION: Performed by: INTERNAL MEDICINE

## 2021-09-26 PROCEDURE — 25010000002 ONDANSETRON PER 1 MG: Performed by: INTERNAL MEDICINE

## 2021-09-26 PROCEDURE — 25010000002 MORPHINE SULFATE (PF) 2 MG/ML SOLUTION: Performed by: INTERNAL MEDICINE

## 2021-09-26 PROCEDURE — 63710000001 ONDANSETRON PER 8 MG: Performed by: INTERNAL MEDICINE

## 2021-09-26 PROCEDURE — 25010000002 HYDRALAZINE PER 20 MG: Performed by: INTERNAL MEDICINE

## 2021-09-26 PROCEDURE — 36410 VNPNXR 3YR/> PHY/QHP DX/THER: CPT

## 2021-09-26 PROCEDURE — 25010000002 FLUCONAZOLE PER 200 MG: Performed by: INTERNAL MEDICINE

## 2021-09-26 RX ORDER — SUMATRIPTAN 50 MG/1
50 TABLET, FILM COATED ORAL ONCE
Status: DISCONTINUED | OUTPATIENT
Start: 2021-09-26 | End: 2021-10-12 | Stop reason: HOSPADM

## 2021-09-26 RX ORDER — POTASSIUM CHLORIDE 7.45 MG/ML
10 INJECTION INTRAVENOUS
Status: DISPENSED | OUTPATIENT
Start: 2021-09-26 | End: 2021-09-26

## 2021-09-27 LAB
MAGNESIUM SERPL-MCNC: 1.9 MG/DL (ref 1.6–2.6)
POTASSIUM SERPL-SCNC: 2.4 MMOL/L (ref 3.5–5.2)
POTASSIUM SERPL-SCNC: 3.1 MMOL/L (ref 3.5–5.2)

## 2021-09-27 PROCEDURE — 25010000002 FLUCONAZOLE PER 200 MG: Performed by: INTERNAL MEDICINE

## 2021-09-27 PROCEDURE — 25010000003 POTASSIUM CHLORIDE 10 MEQ/100ML SOLUTION: Performed by: INTERNAL MEDICINE

## 2021-09-27 PROCEDURE — 25010000002 EPOETIN ALFA-EPBX 10000 UNIT/ML SOLUTION: Performed by: INTERNAL MEDICINE

## 2021-09-27 PROCEDURE — 25010000002 HYDRALAZINE PER 20 MG: Performed by: INTERNAL MEDICINE

## 2021-09-27 PROCEDURE — 84132 ASSAY OF SERUM POTASSIUM: CPT | Performed by: INTERNAL MEDICINE

## 2021-09-27 PROCEDURE — 25010000002 ONDANSETRON PER 1 MG: Performed by: INTERNAL MEDICINE

## 2021-09-27 PROCEDURE — 92526 ORAL FUNCTION THERAPY: CPT | Performed by: SPEECH-LANGUAGE PATHOLOGIST

## 2021-09-27 PROCEDURE — 97166 OT EVAL MOD COMPLEX 45 MIN: CPT

## 2021-09-27 PROCEDURE — 97162 PT EVAL MOD COMPLEX 30 MIN: CPT

## 2021-09-27 PROCEDURE — 25010000002 METOCLOPRAMIDE PER 10 MG: Performed by: INTERNAL MEDICINE

## 2021-09-27 PROCEDURE — 97535 SELF CARE MNGMENT TRAINING: CPT

## 2021-09-27 PROCEDURE — 83735 ASSAY OF MAGNESIUM: CPT | Performed by: INTERNAL MEDICINE

## 2021-09-27 PROCEDURE — 25010000002 HEPARIN (PORCINE) PER 1000 UNITS: Performed by: INTERNAL MEDICINE

## 2021-09-27 RX ORDER — POTASSIUM CHLORIDE 7.45 MG/ML
10 INJECTION INTRAVENOUS
Status: DISPENSED | OUTPATIENT
Start: 2021-09-27 | End: 2021-09-27

## 2021-09-27 RX ORDER — BUMETANIDE 1 MG/1
1 TABLET ORAL
Status: DISCONTINUED | OUTPATIENT
Start: 2021-09-27 | End: 2021-10-02

## 2021-09-28 ENCOUNTER — APPOINTMENT (OUTPATIENT)
Dept: GENERAL RADIOLOGY | Facility: HOSPITAL | Age: 46
End: 2021-09-28

## 2021-09-28 LAB
ANION GAP SERPL CALCULATED.3IONS-SCNC: 13 MMOL/L (ref 5–15)
BASOPHILS # BLD AUTO: 0.07 10*3/MM3 (ref 0–0.2)
BASOPHILS NFR BLD AUTO: 0.7 % (ref 0–1.5)
BUN SERPL-MCNC: 21 MG/DL (ref 6–20)
BUN/CREAT SERPL: 14.7 (ref 7–25)
CALCIUM SPEC-SCNC: 8.6 MG/DL (ref 8.6–10.5)
CHLORIDE SERPL-SCNC: 103 MMOL/L (ref 98–107)
CO2 SERPL-SCNC: 23 MMOL/L (ref 22–29)
CREAT SERPL-MCNC: 1.43 MG/DL (ref 0.57–1)
DEPRECATED RDW RBC AUTO: 57.5 FL (ref 37–54)
EOSINOPHIL # BLD AUTO: 0.32 10*3/MM3 (ref 0–0.4)
EOSINOPHIL NFR BLD AUTO: 3.2 % (ref 0.3–6.2)
ERYTHROCYTE [DISTWIDTH] IN BLOOD BY AUTOMATED COUNT: 16.8 % (ref 12.3–15.4)
GFR SERPL CREATININE-BSD FRML MDRD: 40 ML/MIN/1.73
GLUCOSE SERPL-MCNC: 119 MG/DL (ref 65–99)
HCT VFR BLD AUTO: 27.8 % (ref 34–46.6)
HGB BLD-MCNC: 8.8 G/DL (ref 12–15.9)
IMM GRANULOCYTES # BLD AUTO: 0.38 10*3/MM3 (ref 0–0.05)
IMM GRANULOCYTES NFR BLD AUTO: 3.8 % (ref 0–0.5)
LYMPHOCYTES # BLD AUTO: 0.92 10*3/MM3 (ref 0.7–3.1)
LYMPHOCYTES NFR BLD AUTO: 9.3 % (ref 19.6–45.3)
MAGNESIUM SERPL-MCNC: 1.6 MG/DL (ref 1.6–2.6)
MCH RBC QN AUTO: 30.6 PG (ref 26.6–33)
MCHC RBC AUTO-ENTMCNC: 31.7 G/DL (ref 31.5–35.7)
MCV RBC AUTO: 96.5 FL (ref 79–97)
MONOCYTES # BLD AUTO: 0.79 10*3/MM3 (ref 0.1–0.9)
MONOCYTES NFR BLD AUTO: 8 % (ref 5–12)
NEUTROPHILS NFR BLD AUTO: 7.41 10*3/MM3 (ref 1.7–7)
NEUTROPHILS NFR BLD AUTO: 75 % (ref 42.7–76)
NRBC BLD AUTO-RTO: 0.4 /100 WBC (ref 0–0.2)
PLATELET # BLD AUTO: 314 10*3/MM3 (ref 140–450)
PMV BLD AUTO: 10.3 FL (ref 6–12)
POTASSIUM SERPL-SCNC: 2.4 MMOL/L (ref 3.5–5.2)
RBC # BLD AUTO: 2.88 10*6/MM3 (ref 3.77–5.28)
SODIUM SERPL-SCNC: 139 MMOL/L (ref 136–145)
WBC # BLD AUTO: 9.89 10*3/MM3 (ref 3.4–10.8)

## 2021-09-28 PROCEDURE — 25010000002 FLUCONAZOLE PER 200 MG: Performed by: INTERNAL MEDICINE

## 2021-09-28 PROCEDURE — 97535 SELF CARE MNGMENT TRAINING: CPT

## 2021-09-28 PROCEDURE — 25010000002 METOCLOPRAMIDE PER 10 MG: Performed by: INTERNAL MEDICINE

## 2021-09-28 PROCEDURE — 25010000003 POTASSIUM CHLORIDE 10 MEQ/100ML SOLUTION: Performed by: INTERNAL MEDICINE

## 2021-09-28 PROCEDURE — 80048 BASIC METABOLIC PNL TOTAL CA: CPT | Performed by: INTERNAL MEDICINE

## 2021-09-28 PROCEDURE — 83735 ASSAY OF MAGNESIUM: CPT | Performed by: INTERNAL MEDICINE

## 2021-09-28 PROCEDURE — 97530 THERAPEUTIC ACTIVITIES: CPT

## 2021-09-28 PROCEDURE — 97110 THERAPEUTIC EXERCISES: CPT

## 2021-09-28 PROCEDURE — 71045 X-RAY EXAM CHEST 1 VIEW: CPT

## 2021-09-28 PROCEDURE — 85025 COMPLETE CBC W/AUTO DIFF WBC: CPT | Performed by: INTERNAL MEDICINE

## 2021-09-28 RX ORDER — POTASSIUM CHLORIDE 7.45 MG/ML
10 INJECTION INTRAVENOUS
Status: DISPENSED | OUTPATIENT
Start: 2021-09-28 | End: 2021-09-28

## 2021-09-28 RX ORDER — POTASSIUM CHLORIDE 750 MG/1
20 CAPSULE, EXTENDED RELEASE ORAL DAILY
Status: DISCONTINUED | OUTPATIENT
Start: 2021-09-28 | End: 2021-09-30 | Stop reason: SDUPTHER

## 2021-09-29 LAB
ANION GAP SERPL CALCULATED.3IONS-SCNC: 14 MMOL/L (ref 5–15)
BASOPHILS # BLD AUTO: 0.08 10*3/MM3 (ref 0–0.2)
BASOPHILS NFR BLD AUTO: 0.7 % (ref 0–1.5)
BUN SERPL-MCNC: 23 MG/DL (ref 6–20)
BUN/CREAT SERPL: 16.3 (ref 7–25)
CALCIUM SPEC-SCNC: 8.9 MG/DL (ref 8.6–10.5)
CHLORIDE SERPL-SCNC: 100 MMOL/L (ref 98–107)
CO2 SERPL-SCNC: 24 MMOL/L (ref 22–29)
CREAT SERPL-MCNC: 1.41 MG/DL (ref 0.57–1)
DEPRECATED RDW RBC AUTO: 58.2 FL (ref 37–54)
EOSINOPHIL # BLD AUTO: 0.34 10*3/MM3 (ref 0–0.4)
EOSINOPHIL NFR BLD AUTO: 3.1 % (ref 0.3–6.2)
ERYTHROCYTE [DISTWIDTH] IN BLOOD BY AUTOMATED COUNT: 16.2 % (ref 12.3–15.4)
GFR SERPL CREATININE-BSD FRML MDRD: 41 ML/MIN/1.73
GLUCOSE SERPL-MCNC: 116 MG/DL (ref 65–99)
HCT VFR BLD AUTO: 29.1 % (ref 34–46.6)
HGB BLD-MCNC: 9.4 G/DL (ref 12–15.9)
IMM GRANULOCYTES # BLD AUTO: 0.27 10*3/MM3 (ref 0–0.05)
IMM GRANULOCYTES NFR BLD AUTO: 2.4 % (ref 0–0.5)
LYMPHOCYTES # BLD AUTO: 0.83 10*3/MM3 (ref 0.7–3.1)
LYMPHOCYTES NFR BLD AUTO: 7.5 % (ref 19.6–45.3)
MCH RBC QN AUTO: 32 PG (ref 26.6–33)
MCHC RBC AUTO-ENTMCNC: 32.3 G/DL (ref 31.5–35.7)
MCV RBC AUTO: 99 FL (ref 79–97)
MONOCYTES # BLD AUTO: 0.8 10*3/MM3 (ref 0.1–0.9)
MONOCYTES NFR BLD AUTO: 7.3 % (ref 5–12)
NEUTROPHILS NFR BLD AUTO: 79 % (ref 42.7–76)
NEUTROPHILS NFR BLD AUTO: 8.71 10*3/MM3 (ref 1.7–7)
NRBC BLD AUTO-RTO: 0 /100 WBC (ref 0–0.2)
PLATELET # BLD AUTO: 328 10*3/MM3 (ref 140–450)
PMV BLD AUTO: 10.2 FL (ref 6–12)
POTASSIUM SERPL-SCNC: 2.4 MMOL/L (ref 3.5–5.2)
POTASSIUM SERPL-SCNC: 2.6 MMOL/L (ref 3.5–5.2)
RBC # BLD AUTO: 2.94 10*6/MM3 (ref 3.77–5.28)
SODIUM SERPL-SCNC: 138 MMOL/L (ref 136–145)
WBC # BLD AUTO: 11.03 10*3/MM3 (ref 3.4–10.8)

## 2021-09-29 PROCEDURE — 25010000003 POTASSIUM CHLORIDE 10 MEQ/100ML SOLUTION: Performed by: INTERNAL MEDICINE

## 2021-09-29 PROCEDURE — 97535 SELF CARE MNGMENT TRAINING: CPT

## 2021-09-29 PROCEDURE — 80048 BASIC METABOLIC PNL TOTAL CA: CPT | Performed by: INTERNAL MEDICINE

## 2021-09-29 PROCEDURE — 92526 ORAL FUNCTION THERAPY: CPT

## 2021-09-29 PROCEDURE — 84132 ASSAY OF SERUM POTASSIUM: CPT | Performed by: INTERNAL MEDICINE

## 2021-09-29 PROCEDURE — 25010000002 FLUCONAZOLE PER 200 MG: Performed by: INTERNAL MEDICINE

## 2021-09-29 PROCEDURE — 97530 THERAPEUTIC ACTIVITIES: CPT

## 2021-09-29 PROCEDURE — 85025 COMPLETE CBC W/AUTO DIFF WBC: CPT | Performed by: INTERNAL MEDICINE

## 2021-09-29 PROCEDURE — 25010000002 EPOETIN ALFA-EPBX 10000 UNIT/ML SOLUTION: Performed by: INTERNAL MEDICINE

## 2021-09-29 RX ORDER — POTASSIUM CHLORIDE 7.45 MG/ML
10 INJECTION INTRAVENOUS
Status: DISPENSED | OUTPATIENT
Start: 2021-09-29 | End: 2021-09-29

## 2021-09-29 RX ORDER — AMOXICILLIN 250 MG
1 CAPSULE ORAL 2 TIMES DAILY PRN
Status: DISCONTINUED | OUTPATIENT
Start: 2021-09-29 | End: 2021-10-12 | Stop reason: HOSPADM

## 2021-09-29 RX ORDER — POTASSIUM CHLORIDE 7.45 MG/ML
10 INJECTION INTRAVENOUS
Status: DISPENSED | OUTPATIENT
Start: 2021-09-30 | End: 2021-09-30

## 2021-09-30 LAB
ANION GAP SERPL CALCULATED.3IONS-SCNC: 13 MMOL/L (ref 5–15)
BASOPHILS # BLD AUTO: 0.08 10*3/MM3 (ref 0–0.2)
BASOPHILS NFR BLD AUTO: 0.7 % (ref 0–1.5)
BUN SERPL-MCNC: 21 MG/DL (ref 6–20)
BUN/CREAT SERPL: 16.5 (ref 7–25)
CALCIUM SPEC-SCNC: 9.1 MG/DL (ref 8.6–10.5)
CHLORIDE SERPL-SCNC: 101 MMOL/L (ref 98–107)
CO2 SERPL-SCNC: 23 MMOL/L (ref 22–29)
CREAT SERPL-MCNC: 1.27 MG/DL (ref 0.57–1)
DEPRECATED RDW RBC AUTO: 58.8 FL (ref 37–54)
EOSINOPHIL # BLD AUTO: 0.28 10*3/MM3 (ref 0–0.4)
EOSINOPHIL NFR BLD AUTO: 2.4 % (ref 0.3–6.2)
ERYTHROCYTE [DISTWIDTH] IN BLOOD BY AUTOMATED COUNT: 16.8 % (ref 12.3–15.4)
GFR SERPL CREATININE-BSD FRML MDRD: 46 ML/MIN/1.73
GLUCOSE SERPL-MCNC: 102 MG/DL (ref 65–99)
HCT VFR BLD AUTO: 30.4 % (ref 34–46.6)
HGB BLD-MCNC: 9.4 G/DL (ref 12–15.9)
IMM GRANULOCYTES # BLD AUTO: 0.34 10*3/MM3 (ref 0–0.05)
IMM GRANULOCYTES NFR BLD AUTO: 3 % (ref 0–0.5)
LYMPHOCYTES # BLD AUTO: 0.99 10*3/MM3 (ref 0.7–3.1)
LYMPHOCYTES NFR BLD AUTO: 8.6 % (ref 19.6–45.3)
MCH RBC QN AUTO: 30.3 PG (ref 26.6–33)
MCHC RBC AUTO-ENTMCNC: 30.9 G/DL (ref 31.5–35.7)
MCV RBC AUTO: 98.1 FL (ref 79–97)
MONOCYTES # BLD AUTO: 0.81 10*3/MM3 (ref 0.1–0.9)
MONOCYTES NFR BLD AUTO: 7 % (ref 5–12)
NEUTROPHILS NFR BLD AUTO: 78.3 % (ref 42.7–76)
NEUTROPHILS NFR BLD AUTO: 9.02 10*3/MM3 (ref 1.7–7)
NRBC BLD AUTO-RTO: 0.2 /100 WBC (ref 0–0.2)
NT-PROBNP SERPL-MCNC: 985.8 PG/ML (ref 0–450)
PLATELET # BLD AUTO: 334 10*3/MM3 (ref 140–450)
PMV BLD AUTO: 9.5 FL (ref 6–12)
POTASSIUM SERPL-SCNC: 3.2 MMOL/L (ref 3.5–5.2)
RBC # BLD AUTO: 3.1 10*6/MM3 (ref 3.77–5.28)
SODIUM SERPL-SCNC: 137 MMOL/L (ref 136–145)
WBC # BLD AUTO: 11.52 10*3/MM3 (ref 3.4–10.8)

## 2021-09-30 PROCEDURE — 83880 ASSAY OF NATRIURETIC PEPTIDE: CPT | Performed by: INTERNAL MEDICINE

## 2021-09-30 PROCEDURE — 25010000002 ONDANSETRON PER 1 MG: Performed by: INTERNAL MEDICINE

## 2021-09-30 PROCEDURE — 97110 THERAPEUTIC EXERCISES: CPT

## 2021-09-30 PROCEDURE — 85025 COMPLETE CBC W/AUTO DIFF WBC: CPT | Performed by: INTERNAL MEDICINE

## 2021-09-30 PROCEDURE — 25010000002 FLUCONAZOLE PER 200 MG: Performed by: INTERNAL MEDICINE

## 2021-09-30 PROCEDURE — 80048 BASIC METABOLIC PNL TOTAL CA: CPT | Performed by: INTERNAL MEDICINE

## 2021-09-30 PROCEDURE — 25010000003 POTASSIUM CHLORIDE 10 MEQ/100ML SOLUTION: Performed by: INTERNAL MEDICINE

## 2021-09-30 PROCEDURE — 97535 SELF CARE MNGMENT TRAINING: CPT

## 2021-09-30 RX ORDER — METOPROLOL TARTRATE 50 MG/1
50 TABLET, FILM COATED ORAL EVERY 12 HOURS SCHEDULED
Status: DISCONTINUED | OUTPATIENT
Start: 2021-09-30 | End: 2021-10-05 | Stop reason: SDUPTHER

## 2021-09-30 RX ORDER — ALPRAZOLAM 0.25 MG/1
0.25 TABLET ORAL 2 TIMES DAILY PRN
Status: DISCONTINUED | OUTPATIENT
Start: 2021-09-30 | End: 2021-10-07

## 2021-09-30 RX ORDER — POTASSIUM CHLORIDE 750 MG/1
40 CAPSULE, EXTENDED RELEASE ORAL 2 TIMES DAILY
Status: DISCONTINUED | OUTPATIENT
Start: 2021-09-30 | End: 2021-09-30 | Stop reason: SDUPTHER

## 2021-09-30 RX ORDER — POTASSIUM CHLORIDE 750 MG/1
40 CAPSULE, EXTENDED RELEASE ORAL DAILY
Status: DISCONTINUED | OUTPATIENT
Start: 2021-10-01 | End: 2021-10-12 | Stop reason: HOSPADM

## 2021-10-01 ENCOUNTER — APPOINTMENT (OUTPATIENT)
Dept: GENERAL RADIOLOGY | Facility: HOSPITAL | Age: 46
End: 2021-10-01

## 2021-10-01 LAB
ANION GAP SERPL CALCULATED.3IONS-SCNC: 12 MMOL/L (ref 5–15)
BASOPHILS # BLD AUTO: 0.1 10*3/MM3 (ref 0–0.2)
BASOPHILS NFR BLD AUTO: 0.9 % (ref 0–1.5)
BUN SERPL-MCNC: 21 MG/DL (ref 6–20)
BUN/CREAT SERPL: 18.1 (ref 7–25)
CALCIUM SPEC-SCNC: 9.1 MG/DL (ref 8.6–10.5)
CHLORIDE SERPL-SCNC: 103 MMOL/L (ref 98–107)
CO2 SERPL-SCNC: 26 MMOL/L (ref 22–29)
CREAT SERPL-MCNC: 1.16 MG/DL (ref 0.57–1)
DEPRECATED RDW RBC AUTO: 59.2 FL (ref 37–54)
EOSINOPHIL # BLD AUTO: 0.37 10*3/MM3 (ref 0–0.4)
EOSINOPHIL NFR BLD AUTO: 3.2 % (ref 0.3–6.2)
ERYTHROCYTE [DISTWIDTH] IN BLOOD BY AUTOMATED COUNT: 16.6 % (ref 12.3–15.4)
GFR SERPL CREATININE-BSD FRML MDRD: 51 ML/MIN/1.73
GLUCOSE SERPL-MCNC: 105 MG/DL (ref 65–99)
HCT VFR BLD AUTO: 29.7 % (ref 34–46.6)
HGB BLD-MCNC: 9.1 G/DL (ref 12–15.9)
IMM GRANULOCYTES # BLD AUTO: 0.37 10*3/MM3 (ref 0–0.05)
IMM GRANULOCYTES NFR BLD AUTO: 3.2 % (ref 0–0.5)
LYMPHOCYTES # BLD AUTO: 0.87 10*3/MM3 (ref 0.7–3.1)
LYMPHOCYTES NFR BLD AUTO: 7.6 % (ref 19.6–45.3)
MAGNESIUM SERPL-MCNC: 1.4 MG/DL (ref 1.6–2.6)
MCH RBC QN AUTO: 30.3 PG (ref 26.6–33)
MCHC RBC AUTO-ENTMCNC: 30.6 G/DL (ref 31.5–35.7)
MCV RBC AUTO: 99 FL (ref 79–97)
MONOCYTES # BLD AUTO: 0.92 10*3/MM3 (ref 0.1–0.9)
MONOCYTES NFR BLD AUTO: 8 % (ref 5–12)
NEUTROPHILS NFR BLD AUTO: 77.1 % (ref 42.7–76)
NEUTROPHILS NFR BLD AUTO: 8.87 10*3/MM3 (ref 1.7–7)
NRBC BLD AUTO-RTO: 0 /100 WBC (ref 0–0.2)
PLATELET # BLD AUTO: 377 10*3/MM3 (ref 140–450)
PMV BLD AUTO: 9.7 FL (ref 6–12)
POTASSIUM SERPL-SCNC: 2.6 MMOL/L (ref 3.5–5.2)
RBC # BLD AUTO: 3 10*6/MM3 (ref 3.77–5.28)
SODIUM SERPL-SCNC: 141 MMOL/L (ref 136–145)
WBC # BLD AUTO: 11.5 10*3/MM3 (ref 3.4–10.8)

## 2021-10-01 PROCEDURE — 25010000002 MAGNESIUM SULFATE 2 GM/50ML SOLUTION: Performed by: INTERNAL MEDICINE

## 2021-10-01 PROCEDURE — 74018 RADEX ABDOMEN 1 VIEW: CPT

## 2021-10-01 PROCEDURE — 36589 REMOVAL TUNNELED CV CATH: CPT | Performed by: SURGERY

## 2021-10-01 PROCEDURE — 25010000002 FLUCONAZOLE PER 200 MG: Performed by: INTERNAL MEDICINE

## 2021-10-01 PROCEDURE — 25010000003 POTASSIUM CHLORIDE 10 MEQ/100ML SOLUTION: Performed by: INTERNAL MEDICINE

## 2021-10-01 PROCEDURE — 80048 BASIC METABOLIC PNL TOTAL CA: CPT | Performed by: INTERNAL MEDICINE

## 2021-10-01 PROCEDURE — 97110 THERAPEUTIC EXERCISES: CPT

## 2021-10-01 PROCEDURE — 85025 COMPLETE CBC W/AUTO DIFF WBC: CPT | Performed by: INTERNAL MEDICINE

## 2021-10-01 PROCEDURE — 25010000002 ONDANSETRON PER 1 MG: Performed by: INTERNAL MEDICINE

## 2021-10-01 PROCEDURE — 83735 ASSAY OF MAGNESIUM: CPT | Performed by: INTERNAL MEDICINE

## 2021-10-01 PROCEDURE — 25010000002 EPOETIN ALFA-EPBX 10000 UNIT/ML SOLUTION: Performed by: INTERNAL MEDICINE

## 2021-10-01 PROCEDURE — 97530 THERAPEUTIC ACTIVITIES: CPT

## 2021-10-01 RX ORDER — FLUCONAZOLE 100 MG/1
200 TABLET ORAL EVERY 24 HOURS
Status: DISPENSED | OUTPATIENT
Start: 2021-10-02 | End: 2021-10-05

## 2021-10-01 RX ORDER — POTASSIUM CHLORIDE 750 MG/1
40 CAPSULE, EXTENDED RELEASE ORAL EVERY 4 HOURS
Status: DISPENSED | OUTPATIENT
Start: 2021-10-01 | End: 2021-10-01

## 2021-10-01 RX ORDER — MAGNESIUM SULFATE HEPTAHYDRATE 40 MG/ML
2 INJECTION, SOLUTION INTRAVENOUS
Status: DISPENSED | OUTPATIENT
Start: 2021-10-01 | End: 2021-10-01

## 2021-10-01 RX ORDER — POTASSIUM CHLORIDE 7.45 MG/ML
10 INJECTION INTRAVENOUS
Status: DISCONTINUED | OUTPATIENT
Start: 2021-10-01 | End: 2021-10-01

## 2021-10-01 NOTE — PROCEDURES
Namita Zabala  [id number]  [id number]  10/01/21  589/1      David Lara MD    PREOPERATIVE DIAGNOSIS: Acute renal insufficiency    POSTOPERATIVE DIAGNOSIS: Acute renal insufficiency    PROCEDURE PERFORMED: Removal of left internal jugular vein Permcath    PERFORMED BY: Anders Kaur MD    ANESTHESIA: NONE    ESTIMATED BLOOD LOSS: Less than 5 ml.    COMPLICATIONS: None    INDICATIONS: The patient is a 46 y.o. female who had been admitted with COVID-19 pneumonia.  She had multiorgan system failure and required prolonged intubation.  She also had acute kidney injury as a result and required hemodialysis.  Initially a right femoral vein temporary hemodialysis catheter was placed but she continued to have ongoing dialysis needs and so about a week later a left internal jugular vein permacath was placed in the OR.  She has since had improvement in overall renal recovery and no longer requires dialysis.  As such she is indicated for permacath removal.    DESCRIPTION OF PROCEDURE:  After the patient was correctly identified, the patient was placed in the supine position in her hospital bed.  The stitches of the catheter were removed.  The catheter was easily removed completely intact and direct pressure was held over the left internal jugular vein for hemostasis for 15 minutes.  Sterile dressings were applied.  The patient tolerated the procedure well.    Anders Kaur MD  10/01/2021  15:25 CDT

## 2021-10-01 NOTE — PROGRESS NOTES
Vijay Linares M.D.  BERT Wu        Internal Medicine Progress Note    10/1/2021   10:36 CDT    Name:  Namita Zabala  MRN:    [id number]     Acct:     [id number]   Room:  [address] [place] [address] Day: 0     Admit Date: 9/24/2021  4:31 PM  PCP: David Lara MD    Subjective:     C/C: weakness    Interval History: Status:  stayed the same. Resting in bed. No family at bedside. HD on hold for renal recovery.  Afebrile. 02 sats stable on room air.  Remains generally weak.  Potassium and magnesium low and replacement ongoing. Counts otherwise stable. Slow progress with therapy.     Review of Systems   Constitutional: Positive for malaise/fatigue. Negative for chills, decreased appetite, weight gain and weight loss.   HENT: Negative for congestion, ear discharge, hoarse voice and tinnitus.    Eyes: Negative for blurred vision, discharge, visual disturbance and visual halos.   Cardiovascular: Negative for chest pain, claudication, dyspnea on exertion, irregular heartbeat, leg swelling, orthopnea and paroxysmal nocturnal dyspnea.   Respiratory: Negative for cough, shortness of breath, sputum production and wheezing.    Endocrine: Negative for cold intolerance, heat intolerance and polyuria.   Hematologic/Lymphatic: Negative for adenopathy. Does not bruise/bleed easily.   Skin: Negative for dry skin, itching and suspicious lesions.   Musculoskeletal: Negative for arthritis, back pain, falls, joint pain, muscle weakness and myalgias.   Gastrointestinal: Negative for abdominal pain, constipation, diarrhea, dysphagia and hematemesis.   Genitourinary: Negative for bladder incontinence, dysuria and frequency.   Neurological: Positive for weakness. Negative for aphonia, disturbances in coordination and dizziness.   Psychiatric/Behavioral: Negative for altered mental status, depression, memory loss and substance abuse. The patient does not have insomnia and is not nervous/anxious.           Medications:     Allergies:   Allergies   Allergen Reactions   • Coconut Unknown - High Severity   • Nuts Unknown - High Severity   • Penicillins        Current Meds:   Current Facility-Administered Medications:   •  acetaminophen (TYLENOL) tablet 650 mg, 650 mg, Oral, Q6H PRN **OR** acetaminophen (TYLENOL) suppository 650 mg, 650 mg, Rectal, Q6H PRN, Tommy Linares MD  •  acetaminophen (TYLENOL) tablet 1,000 mg, 1,000 mg, Oral, Q4H PRN, Tommy Linares MD  •  ALPRAZolam (XANAX) tablet 0.25 mg, 0.25 mg, Oral, BID PRN, Tommy Linares MD  •  ascorbic acid (VITAMIN C) tablet 500 mg, 500 mg, Oral, Daily, Tommy Linares MD  •  bumetanide (BUMEX) tablet 1 mg, 1 mg, Oral, BID, Harman Black MD  •  cholecalciferol (VITAMIN D3) tablet 1,000 Units, 1,000 Units, Oral, Daily, Tommy Linares MD  •  cloNIDine (CATAPRES-TTS) 0.1 MG/24HR patch 1 patch, 1 patch, Transdermal, Weekly, Tommy Linares MD  •  dextrose (D50W) 25 g/ 50mL Intravenous Solution 25 g, 25 g, Intravenous, Q15 Min PRN, Tommy Linares MD  •  dextrose (GLUTOSE) oral gel 15 g, 15 g, Oral, Q15 Min PRN, Tommy Linares MD  •  epoetin mindy-epbx (RETACRIT) injection 10,000 Units, 10,000 Units, Subcutaneous, Once per day on Mon Wed Fri, Tommy Linares MD  •  [START ON 10/2/2021] fluconazole (DIFLUCAN) tablet 200 mg, 200 mg, Oral, Q24H, Tommy Linares MD  •  glucagon (human recombinant) (GLUCAGEN DIAGNOSTIC) injection 1 mg, 1 mg, Subcutaneous, Q15 Min PRN, Tommy Linares MD  •  heparin (porcine) injection 3,600 Units, 3,600 Units, Intracatheter, PRN, Tommy Linares MD  •  hydrALAZINE (APRESOLINE) injection 10 mg, 10 mg, Intravenous, Q6H PRN, Tommy Linares MD  •  hydrocortisone-bacitracin-zinc oxide-nystatin (MAGIC BARRIER) ointment 1 application, 1 application, Topical, TID, Dea Puentes MD  •  labetalol (NORMODYNE,TRANDATE) injection 20 mg, 20 mg,  Intravenous, Q6H PRN, Tommy Linares MD  •  lidocaine (LIDODERM) 5 % 1 patch, 1 patch, Transdermal, Q24H, Tommy Linares MD  •  lidocaine (LIDODERM) 5 % 1 patch, 1 patch, Transdermal, Q24H, Tommy Linares MD  •  magnesium sulfate 2g/50 mL (PREMIX) infusion, 2 g, Intravenous, Q2H, Tommy Linares MD  •  metoprolol tartrate (LOPRESSOR) tablet 50 mg, 50 mg, Oral, Q12H, Elmer Johnson MD  •  naloxone (NARCAN) injection 0.2 mg, 0.2 mg, Intravenous, PRN, Tommy Linares MD  •  ondansetron (ZOFRAN) injection 4 mg, 4 mg, Intravenous, Q6H PRN, Tommy Linares MD  •  ondansetron (ZOFRAN) tablet 4 mg, 4 mg, Oral, Q6H PRN, Tommy Linares MD  •  pantoprazole (PROTONIX) EC tablet 40 mg, 40 mg, Oral, Q AM, Tommy Linares MD  •  potassium chloride (MICRO-K) CR capsule 40 mEq, 40 mEq, Oral, Daily, Elmer Johnson MD  •  potassium chloride (MICRO-K) CR capsule 40 mEq, 40 mEq, Oral, Q4H, Tommy Linares MD  •  saccharomyces boulardii (FLORASTOR) capsule 250 mg, 250 mg, Oral, BID, Tommy Linares MD  •  sennosides-docusate (PERICOLACE) 8.6-50 MG per tablet 1 tablet, 1 tablet, Oral, BID PRN, Tommy Linares MD  •  sodium chloride 0.9 % flush 10 mL, 10 mL, Intravenous, Q12H, Tommy Linares MD  •  sodium chloride 0.9 % flush 10 mL, 10 mL, Intravenous, PRN, Tommy Linares MD  •  sodium chloride injection 40 mEq, 10 mL, Intravenous, Q5 Min PRN, Tommy Linares MD  •  sodium citrate 4% anticoagulant solution, 4 mL, Intracatheter, PRN, Tommy Linares MD  •  SUMAtriptan (IMITREX) tablet 50 mg, 50 mg, Oral, Once, Tommy Linares MD  •  zinc sulfate (ZINCATE) capsule 220 mg, 220 mg, Oral, Daily, Tommy Linares MD    Data:     Code Status:    There are no questions and answers to display.       Family History   Problem Relation Age of Onset   • Colon cancer Maternal Aunt 52   • Fibromyalgia Mother    •  "Heart disease Mother    • Hypertension Mother    • Hodgkin's lymphoma Paternal Grandmother    • Ovarian cancer Neg Hx    • Breast cancer Neg Hx        Social History     Socioeconomic History   • Marital status:      Spouse name: Not on file   • Number of children: Not on file   • Years of education: Not on file   • Highest education level: Not on file   Tobacco Use   • Smoking status: Never Smoker   • Smokeless tobacco: Never Used   Substance and Sexual Activity   • Alcohol use: No   • Drug use: No       Vitals:  Ht 170.2 cm (67\")   Wt 101 kg (221 lb 11.2 oz)   LMP  (LMP Unknown)   BMI 34.72 kg/m²   T 98.8 P 102 R 18 /92 Sp02 97% (room air)          I/O (24Hr):  No intake or output data in the 24 hours ending 10/01/21 1036    Labs and imaging:      Recent Results (from the past 12 hour(s))   Magnesium    Collection Time: 10/01/21  4:59 AM    Specimen: Blood   Result Value Ref Range    Magnesium 1.4 (L) 1.6 - 2.6 mg/dL   Basic Metabolic Panel    Collection Time: 10/01/21  4:59 AM    Specimen: Blood   Result Value Ref Range    Glucose 105 (H) 65 - 99 mg/dL    BUN 21 (H) 6 - 20 mg/dL    Creatinine 1.16 (H) 0.57 - 1.00 mg/dL    Sodium 141 136 - 145 mmol/L    Potassium 2.6 (C) 3.5 - 5.2 mmol/L    Chloride 103 98 - 107 mmol/L    CO2 26.0 22.0 - 29.0 mmol/L    Calcium 9.1 8.6 - 10.5 mg/dL    eGFR Non African Amer 51 (L) >60 mL/min/1.73    BUN/Creatinine Ratio 18.1 7.0 - 25.0    Anion Gap 12.0 5.0 - 15.0 mmol/L   CBC Auto Differential    Collection Time: 10/01/21  4:59 AM    Specimen: Blood   Result Value Ref Range    WBC 11.50 (H) 3.40 - 10.80 10*3/mm3    RBC 3.00 (L) 3.77 - 5.28 10*6/mm3    Hemoglobin 9.1 (L) 12.0 - 15.9 g/dL    Hematocrit 29.7 (L) 34.0 - 46.6 %    MCV 99.0 (H) 79.0 - 97.0 fL    MCH 30.3 26.6 - 33.0 pg    MCHC 30.6 (L) 31.5 - 35.7 g/dL    RDW 16.6 (H) 12.3 - 15.4 %    RDW-SD 59.2 (H) 37.0 - 54.0 fl    MPV 9.7 6.0 - 12.0 fL    Platelets 377 140 - 450 10*3/mm3    Neutrophil % 77.1 (H) 42.7 " - 76.0 %    Lymphocyte % 7.6 (L) 19.6 - 45.3 %    Monocyte % 8.0 5.0 - 12.0 %    Eosinophil % 3.2 0.3 - 6.2 %    Basophil % 0.9 0.0 - 1.5 %    Immature Grans % 3.2 (H) 0.0 - 0.5 %    Neutrophils, Absolute 8.87 (H) 1.70 - 7.00 10*3/mm3    Lymphocytes, Absolute 0.87 0.70 - 3.10 10*3/mm3    Monocytes, Absolute 0.92 (H) 0.10 - 0.90 10*3/mm3    Eosinophils, Absolute 0.37 0.00 - 0.40 10*3/mm3    Basophils, Absolute 0.10 0.00 - 0.20 10*3/mm3    Immature Grans, Absolute 0.37 (H) 0.00 - 0.05 10*3/mm3    nRBC 0.0 0.0 - 0.2 /100 WBC                 Physical Examination:        Physical Exam  Vitals and nursing note reviewed.   Constitutional:       Appearance: Normal appearance.   HENT:      Head: Normocephalic.      Right Ear: External ear normal.      Left Ear: External ear normal.      Nose: Nose normal.      Mouth/Throat:      Mouth: Mucous membranes are moist.      Pharynx: Oropharynx is clear.   Eyes:      Extraocular Movements: Extraocular movements intact.      Conjunctiva/sclera: Conjunctivae normal.      Pupils: Pupils are equal, round, and reactive to light.   Cardiovascular:      Rate and Rhythm: Regular rhythm. Tachycardia present.      Heart sounds: Murmur heard.     Pulmonary:      Effort: Pulmonary effort is normal.      Breath sounds: Normal breath sounds.   Abdominal:      General: Bowel sounds are normal. There is distension.      Palpations: Abdomen is soft.   Musculoskeletal:      Cervical back: Normal range of motion and neck supple.      Comments: Generalized weakness   Skin:     General: Skin is warm and dry.   Neurological:      Mental Status: She is alert and oriented to person, place, and time.   Psychiatric:         Mood and Affect: Mood normal.         Behavior: Behavior normal.           Assessment:            * No active hospital problems. *    Past Medical History:   Diagnosis Date   • Angina at rest (CMS/HCC)    • Migraine     Sees Pain Management for injections.    • MVP (mitral valve  prolapse)    • Syncope         Plan:        1. Acute RLL pulmonary embolism  2. S/p COVID 19  3. Acute renal failure  4. Rectus sheath and pelvic hematoma  5. Multifactorial anemia  6. MVP  7. HTN  8. Hypokalemia    Continue current treatment. Monitor counts. Increase activity as tolerated. Aggressive therapies.  Monitor renal function. Labs Monday. Continue potassium replacement. Check stool. Advised patient that resuming birth control at this point with a known PE is not in her best interests.       Electronically signed by BERT Jamison on 10/1/2021 at 10:36 CDT

## 2021-10-01 NOTE — PROGRESS NOTES
PROGRESS NOTE.      Patient:  Namita Zabala  YOB: 1975  Date of Service: 10/1/2021  MRN: [id number]   Acct: [id number]   Primary Care Physician: David Lara MD  Advance Directive:   There are no questions and answers to display.     Admit Date: 9/24/2021       Hospital Day: 0  Referring Provider: Tommy Linares MD      Patient Seen, Chart, Consults, Notes, Labs, Radiology studies reviewed.    Chief complaint: Acute kidney injury/dialysis dependent.    Subjective:  Namita Zabala is a 46 y.o. female  whom we were consulted for acute kidney injury.  Patient presented on 8/27 with dyspnea, hypoxia. Diagnosed with COVID-19 pneumonia. Had a CT angiogram which showed right pulmonary embolism. Intubated on 8/27. Renal function has been steadily declining since 9/2.  Hemoglobin dropped to <6 on 9/04 and imaging revealed large retroperitoneal hematoma causing bilateral hydronephrosis and shifting of bladder. Her renal function did not improve with IV fluids. Dr Kaur placed vascath on 9/08 and patient had first dialysis the same day. Extubated on 9/10. She later had permcath placed on 9/16, femoral line removed same day. Moved to medical floor but has returned to CCU for fever, tachycardia, chest pain. Patient has an NG tube. She got stable and was moved back to the medical floor.   On September 24 she was moved to long-term acute care hospital for further care that include rehabilitation and continuation of dialysis.  On September 29, her hemodialysis treatment has been discontinued as she has shown good renal recovery    This afternoon patient is still weak, unable to walk.  Her renal function has improved and has been off dialysis.  She has good urine output..    Allergies:  Coconut, Nuts, and Penicillins    Home Meds:  Medications Prior to Admission   Medication Sig Dispense Refill Last Dose   • acetaminophen (TYLENOL) 325 MG tablet Take 2 tablets by mouth Every 6 (Six)  Hours As Needed for Mild Pain , Fever or Headache (fever greater than 101.5 F or headache).      • [] ALPRAZolam (XANAX) 0.25 MG tablet Take 1 tablet by mouth 2 (Two) Times a Day As Needed for Anxiety for up to 5 days.      • bisacodyl (DULCOLAX) 10 MG suppository Insert 1 suppository into the rectum Daily.      • cloNIDine (CATAPRES-TTS) 0.1 MG/24HR patch Place 1 patch on the skin as directed by provider 1 (One) Time Per Week.      • dextrose (D50W) 50 % solution Infuse 50 mL into a venous catheter Every 15 (Fifteen) Minutes As Needed for Low Blood Sugar (Blood Sugar Less Than 70).      • dextrose (GLUTOSE) 40 % gel Take 15 g by mouth Every 15 (Fifteen) Minutes As Needed for Low Blood Sugar (Blood sugar less than 70).      • epoetin mindy-epbx (RETACRIT) 68084 UNIT/ML injection Inject 1 mL under the skin into the appropriate area as directed 3 (Three) Times a Week. Indications: ESRD on Dialysis 6.6 mL     • fluconazole (DIFLUCAN) 200-0.9 MG/100ML-% IVPB Infuse 100 mL into a venous catheter Daily for 10 doses. Indications: Urinary Tract Infection 1000 mL 0    • glucagon, human recombinant, (GLUCAGEN DIAGNOSTIC) 1 MG injection Inject 1 mg under the skin into the appropriate area as directed Every 15 (Fifteen) Minutes As Needed (Blood Glucose Less Than 70) for up to 360 days.      • Heparin Sodium, Porcine, (heparin, porcine,) 1000 UNIT/ML injection 3.6 mL by Intracatheter route As Needed (after dialysis). Indications: Other - full anticoagulation      • insulin regular (humuLIN R,novoLIN R) 100 UNIT/ML injection Inject 0-9 Units under the skin into the appropriate area as directed Every 6 (Six) Hours.  12    • lidocaine (LIDODERM) 5 % Place 1 patch on the skin as directed by provider Daily. Remove & Discard patch within 12 hours or as directed by MD      • lidocaine (LIDODERM) 5 % Place 1 patch on the skin as directed by provider Daily. Remove & Discard patch within 12 hours or as directed by MD      •  metoprolol tartrate (LOPRESSOR) 25 MG tablet Take 1 tablet by mouth Every 12 (Twelve) Hours.      • ondansetron (ZOFRAN) 4 MG/2ML injection Infuse 2 mL into a venous catheter Every 6 (Six) Hours As Needed for Nausea or Vomiting.      • pantoprazole (PROTONIX) 40 MG EC tablet Take 1 tablet by mouth Every Morning.      • sennosides-docusate (PERICOLACE) 8.6-50 MG per tablet Take 1 tablet by mouth 2 (Two) Times a Day.          Medicines:  Current Facility-Administered Medications   Medication Dose Route Frequency Provider Last Rate Last Admin   • acetaminophen (TYLENOL) tablet 650 mg  650 mg Oral Q6H PRN Tommy Linares MD        Or   • acetaminophen (TYLENOL) suppository 650 mg  650 mg Rectal Q6H PRN Tommy Linares MD       • acetaminophen (TYLENOL) tablet 1,000 mg  1,000 mg Oral Q4H PRN Tommy Linares MD       • ALPRAZolam (XANAX) tablet 0.25 mg  0.25 mg Oral BID PRN Tommy Linares MD       • ascorbic acid (VITAMIN C) tablet 500 mg  500 mg Oral Daily Tommy Linares MD       • bumetanide (BUMEX) tablet 1 mg  1 mg Oral BID Harman Black MD       • cholecalciferol (VITAMIN D3) tablet 1,000 Units  1,000 Units Oral Daily Tommy Linares MD       • cloNIDine (CATAPRES-TTS) 0.1 MG/24HR patch 1 patch  1 patch Transdermal Weekly Tommy Linares MD       • dextrose (D50W) 25 g/ 50mL Intravenous Solution 25 g  25 g Intravenous Q15 Min PRN Tommy Linares MD       • dextrose (GLUTOSE) oral gel 15 g  15 g Oral Q15 Min PRN Tommy Linares MD       • epoetin mindy-epbx (RETACRIT) injection 10,000 Units  10,000 Units Subcutaneous Once per day on Mon Wed Fri Tommy Linares MD       • [START ON 10/2/2021] fluconazole (DIFLUCAN) tablet 200 mg  200 mg Oral Q24H Tommy Linares MD       • glucagon (human recombinant) (GLUCAGEN DIAGNOSTIC) injection 1 mg  1 mg Subcutaneous Q15 Min PRN Tomym Linares MD       • heparin (porcine) injection 3,600 Units   3,600 Units Intracatheter PRN Tommy Linares MD       • hydrALAZINE (APRESOLINE) injection 10 mg  10 mg Intravenous Q6H PRN Tommy Linares MD       • hydrocortisone-bacitracin-zinc oxide-nystatin (MAGIC BARRIER) ointment 1 application  1 application Topical TID Dea Puentes MD       • labetalol (NORMODYNE,TRANDATE) injection 20 mg  20 mg Intravenous Q6H PRN Tommy Linares MD       • lidocaine (LIDODERM) 5 % 1 patch  1 patch Transdermal Q24H Tommy Linares MD       • lidocaine (LIDODERM) 5 % 1 patch  1 patch Transdermal Q24H Tommy Linares MD       • magnesium sulfate 2g/50 mL (PREMIX) infusion  2 g Intravenous Q2H Tommy Linares MD       • metoprolol tartrate (LOPRESSOR) tablet 50 mg  50 mg Oral Q12H Elmer Johnson MD       • naloxone (NARCAN) injection 0.2 mg  0.2 mg Intravenous PRN Tommy Linares MD       • ondansetron (ZOFRAN) injection 4 mg  4 mg Intravenous Q6H PRN Tommy Linares MD       • ondansetron (ZOFRAN) tablet 4 mg  4 mg Oral Q6H PRN Tommy Linares MD       • pantoprazole (PROTONIX) EC tablet 40 mg  40 mg Oral Q AM Tommy Linares MD       • potassium chloride (MICRO-K) CR capsule 40 mEq  40 mEq Oral Daily Elmer Johnson MD       • potassium chloride (MICRO-K) CR capsule 40 mEq  40 mEq Oral Q4H Tommy Linares MD       • saccharomyces boulardii (FLORASTOR) capsule 250 mg  250 mg Oral BID Tommy Linares MD       • sennosides-docusate (PERICOLACE) 8.6-50 MG per tablet 1 tablet  1 tablet Oral BID PRN Tommy Linares MD       • sodium chloride 0.9 % flush 10 mL  10 mL Intravenous Q12H Tommy Linares MD       • sodium chloride 0.9 % flush 10 mL  10 mL Intravenous PRN Tommy Linares MD       • sodium chloride injection 40 mEq  10 mL Intravenous Q5 Min PRN Tommy Linares MD       • sodium citrate 4% anticoagulant solution  4 mL Intracatheter PRN Tommy Linares  MD Gomez       • SUMAtriptan (IMITREX) tablet 50 mg  50 mg Oral Once Tommy Linares MD       • zinc sulfate (ZINCATE) capsule 220 mg  220 mg Oral Daily Tommy Linares MD           Past Medical History:  Past Medical History:   Diagnosis Date   • Angina at rest (CMS/HCC)    • Migraine     Sees Pain Management for injections.    • MVP (mitral valve prolapse)    • Syncope        Past Surgical History:  Past Surgical History:   Procedure Laterality Date   • BREAST BIOPSY Right 2011    benign   • INSERTION HEMODIALYSIS CATHETER Left 9/16/2021    Procedure: HEMODIALYSIS CATHETER INSERTION;  Surgeon: Anders Kaur MD;  Location: [facility] OR ;  Service: Vascular;  Laterality: Left;   • TONSILLECTOMY         Family History  Family History   Problem Relation Age of Onset   • Colon cancer Maternal Aunt 52   • Fibromyalgia Mother    • Heart disease Mother    • Hypertension Mother    • Hodgkin's lymphoma Paternal Grandmother    • Ovarian cancer Neg Hx    • Breast cancer Neg Hx        Social History  Social History     Socioeconomic History   • Marital status:      Spouse name: Not on file   • Number of children: Not on file   • Years of education: Not on file   • Highest education level: Not on file   Tobacco Use   • Smoking status: Never Smoker   • Smokeless tobacco: Never Used   Substance and Sexual Activity   • Alcohol use: No   • Drug use: No       Review of Systems:  History obtained from chart review and the patient  General ROS: No fever or chills  Respiratory ROS: No cough, +shortness of breath, -wheezing  Cardiovascular ROS: no chest pain but dyspnea on exertion  Gastrointestinal ROS: No abdominal pain or melena  Genito-Urinary ROS: No dysuria or hematuria  Musculoskeletal: negative  Skin: negative    Objective:  Blood pressure: 110/80 mmHg  Heart rate: 91 bpm.  O2 saturation 96%.    Physical examination:  General: awake/alert    Head: Atraumatic, normocephalic  Neck: No masses,  no jugular venous distention  Chest:  clear to auscultation bilaterally without respiratory distress  CVS: regular rate and rhythm  Abdominal: soft, nontender, normal bowel sounds  Extremities: 2+ pedal edema  Skin: warm and dry without rash  Neuro: No focal motor deficits      Labs:  Lab Results (last 24 hours)     Procedure Component Value Units Date/Time    Basic Metabolic Panel [[id number]]  (Abnormal) Collected: 10/01/21 0459    Specimen: Blood Updated: 10/01/21 0605     Glucose 105 mg/dL      BUN 21 mg/dL      Creatinine 1.16 mg/dL      Sodium 141 mmol/L      Potassium 2.6 mmol/L      Chloride 103 mmol/L      CO2 26.0 mmol/L      Calcium 9.1 mg/dL      eGFR Non African Amer 51 mL/min/1.73      BUN/Creatinine Ratio 18.1     Anion Gap 12.0 mmol/L     Narrative:      GFR Normal >60  Chronic Kidney Disease <60  Kidney Failure <15      Magnesium [[id number]]  (Abnormal) Collected: 10/01/21 0459    Specimen: Blood Updated: 10/01/21 0601     Magnesium 1.4 mg/dL     CBC & Differential [[id number]]  (Abnormal) Collected: 10/01/21 0459    Specimen: Blood Updated: 10/01/21 0543    Narrative:      The following orders were created for panel order CBC & Differential.  Procedure                               Abnormality         Status                     ---------                               -----------         ------                     CBC Auto Differential[[id number]]        Abnormal            Final result                 Please view results for these tests on the individual orders.    CBC Auto Differential [[id number]]  (Abnormal) Collected: 10/01/21 0459    Specimen: Blood Updated: 10/01/21 0543     WBC 11.50 10*3/mm3      RBC 3.00 10*6/mm3      Hemoglobin 9.1 g/dL      Hematocrit 29.7 %      MCV 99.0 fL      MCH 30.3 pg      MCHC 30.6 g/dL      RDW 16.6 %      RDW-SD 59.2 fl      MPV 9.7 fL      Platelets 377 10*3/mm3      Neutrophil % 77.1 %      Lymphocyte % 7.6 %      Monocyte % 8.0 %      Eosinophil % 3.2 %       Basophil % 0.9 %      Immature Grans % 3.2 %      Neutrophils, Absolute 8.87 10*3/mm3      Lymphocytes, Absolute 0.87 10*3/mm3      Monocytes, Absolute 0.92 10*3/mm3      Eosinophils, Absolute 0.37 10*3/mm3      Basophils, Absolute 0.10 10*3/mm3      Immature Grans, Absolute 0.37 10*3/mm3      nRBC 0.0 /100 WBC           Radiology:   Imaging Results (Last 24 Hours)     ** No results found for the last 24 hours. **              Assessment   1.  Acute kidney injury/stage III/improved/off dialysis.  2.  Obstructive uropathy/improved.  3.  Left pelvic hematoma compressing bladder.  4.  Acute hypoxemic respiratory failure now resolved.  5.  Bilateral COVID-19 pneumonia improved.  6.  Pulmonary embolism   7.  Metabolic acidosis--improved  9.  IV contrast dye exposure  10.  Hypokalemia.    Plan:  1.  Pending vascular surgery to remove permacatheter.  2.  Potassium supplement.  3.  Continue p.o. Bumex.  4.  Continue Lopressor.        Harman Black MD  10/1/2021  11:33 CDT

## 2021-10-01 NOTE — PROGRESS NOTES
"Adult Nutrition  Assessment/PES    Patient Name:  Namita Zabala  YOB: 1975  MRN: [id number]  Admit Date:  9/24/2021    Assessment Date:  10/1/2021    Comments:  Pt reports nausea. Food preferences obtained. Snacks in room. Discontinuing Boost Breeze; pt drinks Slimfast.     Reason for Assessment     Row Name 10/01/21 1419          Reason for Assessment    Reason For Assessment  follow-up protocol     Diagnosis  infection/sepsis;pulmonary disease;renal disease;hematological/related complications     Identified At Risk by Screening Criteria  reduced oral intake over the last month         Nutrition/Diet History     Row Name 10/01/21 1419          Nutrition/Diet History    Typical Food/Fluid Intake  Pt reported nausea. Snacks in room. Dislikes Boost Breeze; drinks Slimfast. Preferences obtained.     Food Allergies  peanut/tree nut;other (see comments) coconut     Factors Affecting Nutritional Intake  altered gastrointestinal function;early satiety;nausea         Anthropometrics     Row Name 10/01/21 1421 10/01/21 1420       Anthropometrics    Height  170.2 cm (67\")  170.2 cm (67\")    Weight  --  101 kg (221 lb 11.2 oz)       Ideal Body Weight (IBW)    Ideal Body Weight (IBW) (kg)  61.86  61.86    % Ideal Body Weight  --  162.56       Body Mass Index (BMI)    BMI (kg/m2)  --  34.8    BMI Assessment  --  BMI 30-34.9: obesity grade I        Labs/Tests/Procedures/Meds     Row Name 10/01/21 1420          Labs/Procedures/Meds    Lab Results Reviewed  reviewed, pertinent     Lab Results Comments  K+, Glu, BUN, Cr, Mg, PAB        Diagnostic Tests/Procedures    Diagnostic Test/Procedure Reviewed  reviewed, pertinent     Diagnostic Test/Procedures Comments  HD        Medications    Pertinent Medications Reviewed  reviewed, pertinent     Pertinent Medications Comments  See MAR         Physical Findings     Row Name 10/01/21 1420          Physical Findings    Overall Physical Appearance  generalized wasting  " "   Gastrointestinal  -- BM 9/30, small     Skin  edema;pressure injury         Estimated/Assessed Needs     Row Name 10/01/21 1421 10/01/21 1420       Calculation Measurements    Weight Used For Calculations  101 kg (222 lb 10.6 oz)  101 kg (222 lb 10.6 oz)    Height  170.2 cm (67\")  170.2 cm (67\")       Estimated/Assessed Needs    Additional Documentation  --  Fluid Requirements (Group);[place] Jeor Equation (Group);Protein Requirements (Group)       KCAL/KG    25 Kcal/Kg (kcal)  2525  2525       [place]. Jeor Equation    RMR ([place] Jeor Equation)  1692.625  1692.625    [place]. Jeor Activity Factors  --  1.2    Activity Factors ([place]-St. Jeor)  --  2031.15       Protein Requirements    Weight Used For Protein Calculations  --  61.2 kg (135 lb) IBW    Est Protein Requirement Amount (gms/kg)  --  1.2 gm protein    Estimated Protein Requirements (gms/day)  --  73.48       Fluid Requirements    Fluid Requirements (mL/day)  --  -- UOP + 1000 mL        Nutrition Prescription Ordered     Row Name 10/01/21 1421          Nutrition Prescription PO    Current PO Diet  Soft Texture     Texture  Ground     Fluid Consistency  Thin     Supplement  Boost Breeze (Ensure)     Supplement Frequency  2 times a day         Evaluation of Received Nutrient/Fluid Intake     Row Name 10/01/21 1421          Calculation Measurements    Weight Used For Calculations  101 kg (222 lb 10.6 oz)     Height  170.2 cm (67\")        Fluid Intake Evaluation    Oral Fluid (mL)  240 3-day average     IV Fluid (mL)  502        PO Evaluation    Number of Days PO Intake Evaluated  3 days     Number of Meals  3     % PO Intake  33               Problem/Interventions:    Problem 2     Row Name 10/01/21 1422          Nutrition Diagnoses Problem 2    Problem 2  Altered Nutrition Related to Labs     Etiology (related to)  Medication Interaction;Medical Diagnosis     Renal  JUVENAL     Signs/Symptoms (evidenced by)  Biochemical     Specific Labs " Noted  BUN;Creatinine;K+         Problem 3     Row Name 10/01/21 1422          Nutrition Diagnoses Problem 3    Problem 3  Predicted Suboptimal Intake     Etiology (related to)  Medical Diagnosis     Nutrition related  Increased nutrition needs     Hematological  Anemia     Pulmonary/Critical Care  Acute respiratory failure;Pulmonary emboli;Other (comment) s/p COVID 19     Renal  ARF;Hemodialysis     Signs/Symptoms (evidenced by)  PO Intake;Report/Observation     Percent (%) intake recorded  -- insuff data     Reported/Observed By  Patient     Appetite  Poor     Reported GI Symptoms  Other (comment) nausea; has not vomitted           Intervention Goal     Row Name 10/01/21 1423          Intervention Goal    General  Improved nutrition related lab(s);Reduce/improve symptoms;Meet nutritional needs for age/condition;Disease management/therapy     PO  Establish PO;Tolerate PO;Increase intake;Meet estimated needs;PO intake (%)     PO Intake %  75 % or greater     Weight  Appropriate weight loss         Nutrition Intervention     Row Name 10/01/21 1423          Nutrition Intervention    RD/Tech Action  Care plan reviewd;Follow Tx progress;Interview for preference;Encourage intake Discontinuing Boost; pt has Slimfast brought in from OSH         Nutrition Prescription     Row Name 10/01/21 1423          Nutrition Prescription PO    PO Prescription  Discontinue supplement     Supplement  Boost Breeze         Education/Evaluation     Row Name 10/01/21 1423          Education    Education  No discharge needs identified at this time        Monitor/Evaluation    Monitor  Per protocol           Electronically signed by:  Morelia Warner RD  10/01/21 14:24 CDT

## 2021-10-02 ENCOUNTER — APPOINTMENT (OUTPATIENT)
Dept: GENERAL RADIOLOGY | Facility: HOSPITAL | Age: 46
End: 2021-10-02

## 2021-10-02 LAB
ANION GAP SERPL CALCULATED.3IONS-SCNC: 12 MMOL/L (ref 5–15)
BASOPHILS # BLD AUTO: 0.07 10*3/MM3 (ref 0–0.2)
BASOPHILS NFR BLD AUTO: 0.8 % (ref 0–1.5)
BUN SERPL-MCNC: 17 MG/DL (ref 6–20)
BUN/CREAT SERPL: 15 (ref 7–25)
CALCIUM SPEC-SCNC: 9.3 MG/DL (ref 8.6–10.5)
CHLORIDE SERPL-SCNC: 104 MMOL/L (ref 98–107)
CO2 SERPL-SCNC: 26 MMOL/L (ref 22–29)
CREAT SERPL-MCNC: 1.13 MG/DL (ref 0.57–1)
DEPRECATED RDW RBC AUTO: 56.9 FL (ref 37–54)
EOSINOPHIL # BLD AUTO: 0.33 10*3/MM3 (ref 0–0.4)
EOSINOPHIL NFR BLD AUTO: 3.6 % (ref 0.3–6.2)
ERYTHROCYTE [DISTWIDTH] IN BLOOD BY AUTOMATED COUNT: 16.6 % (ref 12.3–15.4)
GFR SERPL CREATININE-BSD FRML MDRD: 52 ML/MIN/1.73
GLUCOSE SERPL-MCNC: 110 MG/DL (ref 65–99)
HCT VFR BLD AUTO: 29.5 % (ref 34–46.6)
HGB BLD-MCNC: 9.3 G/DL (ref 12–15.9)
IMM GRANULOCYTES # BLD AUTO: 0.29 10*3/MM3 (ref 0–0.05)
IMM GRANULOCYTES NFR BLD AUTO: 3.1 % (ref 0–0.5)
LYMPHOCYTES # BLD AUTO: 0.96 10*3/MM3 (ref 0.7–3.1)
LYMPHOCYTES NFR BLD AUTO: 10.3 % (ref 19.6–45.3)
MAGNESIUM SERPL-MCNC: 2.2 MG/DL (ref 1.6–2.6)
MAGNESIUM SERPL-MCNC: 2.7 MG/DL (ref 1.6–2.6)
MCH RBC QN AUTO: 30.3 PG (ref 26.6–33)
MCHC RBC AUTO-ENTMCNC: 31.5 G/DL (ref 31.5–35.7)
MCV RBC AUTO: 96.1 FL (ref 79–97)
MONOCYTES # BLD AUTO: 0.78 10*3/MM3 (ref 0.1–0.9)
MONOCYTES NFR BLD AUTO: 8.4 % (ref 5–12)
NEUTROPHILS NFR BLD AUTO: 6.86 10*3/MM3 (ref 1.7–7)
NEUTROPHILS NFR BLD AUTO: 73.8 % (ref 42.7–76)
NRBC BLD AUTO-RTO: 0 /100 WBC (ref 0–0.2)
PLATELET # BLD AUTO: 406 10*3/MM3 (ref 140–450)
PMV BLD AUTO: 9.6 FL (ref 6–12)
POTASSIUM SERPL-SCNC: 2.5 MMOL/L (ref 3.5–5.2)
POTASSIUM SERPL-SCNC: 3.1 MMOL/L (ref 3.5–5.2)
RBC # BLD AUTO: 3.07 10*6/MM3 (ref 3.77–5.28)
SODIUM SERPL-SCNC: 142 MMOL/L (ref 136–145)
WBC # BLD AUTO: 9.29 10*3/MM3 (ref 3.4–10.8)

## 2021-10-02 PROCEDURE — 80048 BASIC METABOLIC PNL TOTAL CA: CPT | Performed by: INTERNAL MEDICINE

## 2021-10-02 PROCEDURE — 83735 ASSAY OF MAGNESIUM: CPT | Performed by: INTERNAL MEDICINE

## 2021-10-02 PROCEDURE — 97530 THERAPEUTIC ACTIVITIES: CPT

## 2021-10-02 PROCEDURE — 25010000003 POTASSIUM CHLORIDE 10 MEQ/100ML SOLUTION: Performed by: INTERNAL MEDICINE

## 2021-10-02 PROCEDURE — 85025 COMPLETE CBC W/AUTO DIFF WBC: CPT | Performed by: INTERNAL MEDICINE

## 2021-10-02 PROCEDURE — 74018 RADEX ABDOMEN 1 VIEW: CPT

## 2021-10-02 PROCEDURE — 84132 ASSAY OF SERUM POTASSIUM: CPT | Performed by: INTERNAL MEDICINE

## 2021-10-02 RX ORDER — BUMETANIDE 1 MG/1
1 TABLET ORAL DAILY
Status: DISCONTINUED | OUTPATIENT
Start: 2021-10-03 | End: 2021-10-03

## 2021-10-02 RX ORDER — POTASSIUM CHLORIDE 7.45 MG/ML
10 INJECTION INTRAVENOUS
Status: DISPENSED | OUTPATIENT
Start: 2021-10-02 | End: 2021-10-03

## 2021-10-02 RX ORDER — POTASSIUM CHLORIDE 7.45 MG/ML
10 INJECTION INTRAVENOUS
Status: DISPENSED | OUTPATIENT
Start: 2021-10-02 | End: 2021-10-02

## 2021-10-02 RX ORDER — SPIRONOLACTONE 25 MG/1
25 TABLET ORAL EVERY MORNING
Status: DISCONTINUED | OUTPATIENT
Start: 2021-10-03 | End: 2021-10-04

## 2021-10-02 NOTE — PROGRESS NOTES
PROGRESS NOTE.      Patient:  Namita Zabala  YOB: 1975  Date of Service: 10/2/2021  MRN: [id number]   Acct: [id number]   Primary Care Physician: David Lara MD  Advance Directive:   There are no questions and answers to display.     Admit Date: 9/24/2021       Hospital Day: 0  Referring Provider: Tommy Linares MD      Patient Seen, Chart, Consults, Notes, Labs, Radiology studies reviewed.    Chief complaint: Acute kidney injury/dialysis dependent.    Subjective:  Namita Zabala is a 46 y.o. female  whom we were consulted for acute kidney injury.  Patient presented on 8/27 with dyspnea, hypoxia. Diagnosed with COVID-19 pneumonia. Had a CT angiogram which showed right pulmonary embolism. Intubated on 8/27. Renal function has been steadily declining since 9/2.  Hemoglobin dropped to <6 on 9/04 and imaging revealed large retroperitoneal hematoma causing bilateral hydronephrosis and shifting of bladder. Her renal function did not improve with IV fluids. Dr Kaur placed vascath on 9/08 and patient had first dialysis the same day. Extubated on 9/10. She later had permcath placed on 9/16, femoral line removed same day. Moved to medical floor but has returned to CCU for fever, tachycardia, chest pain. Patient has an NG tube. She got stable and was moved back to the medical floor.   On September 24 she was moved to long-term acute care hospital for further care that include rehabilitation and continuation of dialysis.  On September 29, her hemodialysis treatment has been discontinued as she has shown good renal recovery.  On October 1 her permacath was removed.    This afternoon patient is still very weak.  She is persistently hypokalemic although she has excellent diuresis, responding to Bumex..    Allergies:  Coconut, Nuts, and Penicillins    Home Meds:  Medications Prior to Admission   Medication Sig Dispense Refill Last Dose   • acetaminophen (TYLENOL) 325 MG tablet Take 2  tablets by mouth Every 6 (Six) Hours As Needed for Mild Pain , Fever or Headache (fever greater than 101.5 F or headache).      • [] ALPRAZolam (XANAX) 0.25 MG tablet Take 1 tablet by mouth 2 (Two) Times a Day As Needed for Anxiety for up to 5 days.      • bisacodyl (DULCOLAX) 10 MG suppository Insert 1 suppository into the rectum Daily.      • cloNIDine (CATAPRES-TTS) 0.1 MG/24HR patch Place 1 patch on the skin as directed by provider 1 (One) Time Per Week.      • dextrose (D50W) 50 % solution Infuse 50 mL into a venous catheter Every 15 (Fifteen) Minutes As Needed for Low Blood Sugar (Blood Sugar Less Than 70).      • dextrose (GLUTOSE) 40 % gel Take 15 g by mouth Every 15 (Fifteen) Minutes As Needed for Low Blood Sugar (Blood sugar less than 70).      • epoetin mindy-epbx (RETACRIT) 89870 UNIT/ML injection Inject 1 mL under the skin into the appropriate area as directed 3 (Three) Times a Week. Indications: ESRD on Dialysis 6.6 mL     • fluconazole (DIFLUCAN) 200-0.9 MG/100ML-% IVPB Infuse 100 mL into a venous catheter Daily for 10 doses. Indications: Urinary Tract Infection 1000 mL 0    • glucagon, human recombinant, (GLUCAGEN DIAGNOSTIC) 1 MG injection Inject 1 mg under the skin into the appropriate area as directed Every 15 (Fifteen) Minutes As Needed (Blood Glucose Less Than 70) for up to 360 days.      • Heparin Sodium, Porcine, (heparin, porcine,) 1000 UNIT/ML injection 3.6 mL by Intracatheter route As Needed (after dialysis). Indications: Other - full anticoagulation      • insulin regular (humuLIN R,novoLIN R) 100 UNIT/ML injection Inject 0-9 Units under the skin into the appropriate area as directed Every 6 (Six) Hours.  12    • lidocaine (LIDODERM) 5 % Place 1 patch on the skin as directed by provider Daily. Remove & Discard patch within 12 hours or as directed by MD      • lidocaine (LIDODERM) 5 % Place 1 patch on the skin as directed by provider Daily. Remove & Discard patch within 12 hours or as  directed by MD      • metoprolol tartrate (LOPRESSOR) 25 MG tablet Take 1 tablet by mouth Every 12 (Twelve) Hours.      • ondansetron (ZOFRAN) 4 MG/2ML injection Infuse 2 mL into a venous catheter Every 6 (Six) Hours As Needed for Nausea or Vomiting.      • pantoprazole (PROTONIX) 40 MG EC tablet Take 1 tablet by mouth Every Morning.      • sennosides-docusate (PERICOLACE) 8.6-50 MG per tablet Take 1 tablet by mouth 2 (Two) Times a Day.          Medicines:  Current Facility-Administered Medications   Medication Dose Route Frequency Provider Last Rate Last Admin   • acetaminophen (TYLENOL) tablet 650 mg  650 mg Oral Q6H PRN Tommy Linares MD        Or   • acetaminophen (TYLENOL) suppository 650 mg  650 mg Rectal Q6H PRN Tommy Linares MD       • acetaminophen (TYLENOL) tablet 1,000 mg  1,000 mg Oral Q4H PRN Tommy Linares MD       • ALPRAZolam (XANAX) tablet 0.25 mg  0.25 mg Oral BID PRN Tommy Linares MD       • ascorbic acid (VITAMIN C) tablet 500 mg  500 mg Oral Daily Tommy Linares MD       • bumetanide (BUMEX) tablet 1 mg  1 mg Oral BID Harman Black MD       • cholecalciferol (VITAMIN D3) tablet 1,000 Units  1,000 Units Oral Daily Tommy Linares MD       • cloNIDine (CATAPRES-TTS) 0.1 MG/24HR patch 1 patch  1 patch Transdermal Weekly Tommy Linares MD       • dextrose (D50W) 25 g/ 50mL Intravenous Solution 25 g  25 g Intravenous Q15 Min PRN Tommy Linares MD       • dextrose (GLUTOSE) oral gel 15 g  15 g Oral Q15 Min PRN Tommy Linares MD       • epoetin mindy-epbx (RETACRIT) injection 10,000 Units  10,000 Units Subcutaneous Once per day on Mon Wed Fri Tommy Linares MD       • fluconazole (DIFLUCAN) tablet 200 mg  200 mg Oral Q24H Tommy Linares MD       • glucagon (human recombinant) (GLUCAGEN DIAGNOSTIC) injection 1 mg  1 mg Subcutaneous Q15 Min PRN Tommy Linares MD       • heparin (porcine) injection 3,600 Units   3,600 Units Intracatheter PRN Tommy Linares MD       • hydrALAZINE (APRESOLINE) injection 10 mg  10 mg Intravenous Q6H PRN Tommy Linares MD       • hydrocortisone-bacitracin-zinc oxide-nystatin (MAGIC BARRIER) ointment 1 application  1 application Topical TID Dea Puentes MD       • labetalol (NORMODYNE,TRANDATE) injection 20 mg  20 mg Intravenous Q6H PRN Tommy Linares MD       • lidocaine (LIDODERM) 5 % 1 patch  1 patch Transdermal Q24H Tommy Linares MD       • lidocaine (LIDODERM) 5 % 1 patch  1 patch Transdermal Q24H Tommy Linares MD       • metoprolol tartrate (LOPRESSOR) tablet 50 mg  50 mg Oral Q12H Elmer Johnson MD       • naloxone (NARCAN) injection 0.2 mg  0.2 mg Intravenous PRN Tommy Linares MD       • ondansetron (ZOFRAN) injection 4 mg  4 mg Intravenous Q6H PRN Tommy Linares MD       • ondansetron (ZOFRAN) tablet 4 mg  4 mg Oral Q6H PRN Tommy Linares MD       • pantoprazole (PROTONIX) EC tablet 40 mg  40 mg Oral Q AM Tommy Linares MD       • potassium chloride (MICRO-K) CR capsule 40 mEq  40 mEq Oral Daily Elmer Johnson MD       • saccharomyces boulardii (FLORASTOR) capsule 250 mg  250 mg Oral BID Tommy Linares MD       • sennosides-docusate (PERICOLACE) 8.6-50 MG per tablet 1 tablet  1 tablet Oral BID PRN Tommy Linares MD       • sodium chloride 0.9 % flush 10 mL  10 mL Intravenous Q12H Tommy Linares MD       • sodium chloride 0.9 % flush 10 mL  10 mL Intravenous PRN Tommy Linares MD       • sodium chloride injection 40 mEq  10 mL Intravenous Q5 Min PRN Tommy Linares MD       • sodium citrate 4% anticoagulant solution  4 mL Intracatheter PRN Tommy Linares MD       • SUMAtriptan (IMITREX) tablet 50 mg  50 mg Oral Once Tommy Linares MD       • zinc sulfate (ZINCATE) capsule 220 mg  220 mg Oral Daily Tommy Linares MD            Past Medical History:  Past Medical History:   Diagnosis Date   • Angina at rest (CMS/HCC)    • Migraine     Sees Pain Management for injections.    • MVP (mitral valve prolapse)    • Syncope        Past Surgical History:  Past Surgical History:   Procedure Laterality Date   • BREAST BIOPSY Right 2011    benign   • INSERTION HEMODIALYSIS CATHETER Left 9/16/2021    Procedure: HEMODIALYSIS CATHETER INSERTION;  Surgeon: Anders Kaur MD;  Location: [place] [address];  Service: Vascular;  Laterality: Left;   • TONSILLECTOMY         Family History  Family History   Problem Relation Age of Onset   • Colon cancer Maternal Aunt 52   • Fibromyalgia Mother    • Heart disease Mother    • Hypertension Mother    • Hodgkin's lymphoma Paternal Grandmother    • Ovarian cancer Neg Hx    • Breast cancer Neg Hx        Social History  Social History     Socioeconomic History   • Marital status:      Spouse name: Not on file   • Number of children: Not on file   • Years of education: Not on file   • Highest education level: Not on file   Tobacco Use   • Smoking status: Never Smoker   • Smokeless tobacco: Never Used   Substance and Sexual Activity   • Alcohol use: No   • Drug use: No       Review of Systems:  History obtained from chart review and the patient  General ROS: No fever or chills  Respiratory ROS: No cough, +shortness of breath, -wheezing  Cardiovascular ROS: no chest pain but dyspnea on exertion  Gastrointestinal ROS: No abdominal pain or melena  Genito-Urinary ROS: No dysuria or hematuria  Musculoskeletal: negative  Skin: negative    Objective:  Blood pressure: 114/83 mmHg  Heart rate: 91 bpm.  O2 saturation 96%.    Physical examination:  General: awake/alert    Head: Atraumatic, normocephalic  Neck: No masses, no jugular venous distention  Chest:  clear to auscultation bilaterally without respiratory distress  CVS: regular rate and rhythm  Abdominal: soft, nontender, normal bowel  sounds  Extremities: 2+ pedal edema  Skin: warm and dry without rash  Neuro: No focal motor deficits      Labs:  Lab Results (last 24 hours)     Procedure Component Value Units Date/Time    Magnesium [[id number]]  (Abnormal) Collected: 10/02/21 0447    Specimen: Blood Updated: 10/02/21 0741     Magnesium 2.7 mg/dL     Basic Metabolic Panel [[id number]]  (Abnormal) Collected: 10/02/21 0447    Specimen: Blood Updated: 10/02/21 0613     Glucose 110 mg/dL      BUN 17 mg/dL      Creatinine 1.13 mg/dL      Sodium 142 mmol/L      Potassium 2.5 mmol/L      Chloride 104 mmol/L      CO2 26.0 mmol/L      Calcium 9.3 mg/dL      eGFR Non African Amer 52 mL/min/1.73      BUN/Creatinine Ratio 15.0     Anion Gap 12.0 mmol/L     Narrative:      GFR Normal >60  Chronic Kidney Disease <60  Kidney Failure <15      CBC & Differential [[id number]]  (Abnormal) Collected: 10/02/21 0447    Specimen: Blood Updated: 10/02/21 0538    Narrative:      The following orders were created for panel order CBC & Differential.  Procedure                               Abnormality         Status                     ---------                               -----------         ------                     CBC Auto Differential[[id number]]        Abnormal            Final result                 Please view results for these tests on the individual orders.    CBC Auto Differential [[id number]]  (Abnormal) Collected: 10/02/21 0447    Specimen: Blood Updated: 10/02/21 0538     WBC 9.29 10*3/mm3      RBC 3.07 10*6/mm3      Hemoglobin 9.3 g/dL      Hematocrit 29.5 %      MCV 96.1 fL      MCH 30.3 pg      MCHC 31.5 g/dL      RDW 16.6 %      RDW-SD 56.9 fl      MPV 9.6 fL      Platelets 406 10*3/mm3      Neutrophil % 73.8 %      Lymphocyte % 10.3 %      Monocyte % 8.4 %      Eosinophil % 3.6 %      Basophil % 0.8 %      Immature Grans % 3.1 %      Neutrophils, Absolute 6.86 10*3/mm3      Lymphocytes, Absolute 0.96 10*3/mm3      Monocytes, Absolute 0.78 10*3/mm3       Eosinophils, Absolute 0.33 10*3/mm3      Basophils, Absolute 0.07 10*3/mm3      Immature Grans, Absolute 0.29 10*3/mm3      nRBC 0.0 /100 WBC           Radiology:   Imaging Results (Last 24 Hours)     ** No results found for the last 24 hours. **              Assessment   1.  Acute kidney injury/stage III/improved/off dialysis.  2.  Obstructive uropathy/improved.  3.  Left pelvic hematoma compressing bladder.  4.  Acute hypoxemic respiratory failure now resolved.  5.  Bilateral COVID-19 pneumonia improved.  6.  Pulmonary embolism   7.  Metabolic acidosis--improved  9.  IV contrast dye exposure  10.  Hypokalemia.    Plan:  1.  Add Aldactone.  2.  Potassium supplement.  3.  Change Bumex to every day  4.  Continue Lopressor.        Harman Black MD  10/2/2021  14:37 CDT

## 2021-10-02 NOTE — PROGRESS NOTES
Vijay Linares M.D.  BERT Wu        Internal Medicine Progress Note    10/2/2021   10:54 CDT    Name:  Namita Zabala  MRN:    [id number]     Acct:     [id number]   Room:  [address] [place] [address] Day: 0     Admit Date: 9/24/2021  4:31 PM  PCP: David Lara MD    Subjective:     C/C: weakness    Interval History: Status:  stayed the same.  Resting in bed.  No family at bedside.  Afebrile.  Remains on room air and tolerating without difficulty.  Continues to have generalized weakness.  Continues to have hypokalemia, potassium 2.5 and currently receiving potassium runs.  Magnesium 2.7 after replacement.  KUB last night revealed dilated loops of bowel in the upper abdomen for questionable ileus, lack of rectal gas and soft tissue mass in pelvis.  Adominal distention improved, bowel sounds positive and had small bowel movement last night.  Denies any nausea or episodes of emesis.  Currently n.p.o. and will resume with ice chips and sips of water and slowly progress diet.  Currently menstruating.    Slow progress with therapy.     Review of Systems   Constitutional: Positive for malaise/fatigue. Negative for chills, decreased appetite, weight gain and weight loss.   HENT: Negative for congestion, ear discharge, hoarse voice and tinnitus.    Eyes: Negative for blurred vision, discharge, visual disturbance and visual halos.   Cardiovascular: Negative for chest pain, claudication, dyspnea on exertion, irregular heartbeat, leg swelling, orthopnea and paroxysmal nocturnal dyspnea.   Respiratory: Negative for cough, shortness of breath, sputum production and wheezing.    Endocrine: Negative for cold intolerance, heat intolerance and polyuria.   Hematologic/Lymphatic: Negative for adenopathy. Does not bruise/bleed easily.   Skin: Negative for dry skin, itching and suspicious lesions.   Musculoskeletal: Negative for arthritis, back pain, falls, joint pain, muscle weakness and myalgias.    Gastrointestinal: Negative for abdominal pain, constipation, diarrhea, dysphagia and hematemesis.   Genitourinary: Negative for bladder incontinence, dysuria and frequency.   Neurological: Positive for weakness. Negative for aphonia, disturbances in coordination and dizziness.   Psychiatric/Behavioral: Negative for altered mental status, depression, memory loss and substance abuse. The patient does not have insomnia and is not nervous/anxious.          Medications:     Allergies:   Allergies   Allergen Reactions    Coconut Unknown - High Severity    Nuts Unknown - High Severity    Penicillins        Current Meds:   Current Facility-Administered Medications:     acetaminophen (TYLENOL) tablet 650 mg, 650 mg, Oral, Q6H PRN **OR** acetaminophen (TYLENOL) suppository 650 mg, 650 mg, Rectal, Q6H PRN, Tommy Linares MD    acetaminophen (TYLENOL) tablet 1,000 mg, 1,000 mg, Oral, Q4H PRN, Tommy Linares MD    ALPRAZolam (XANAX) tablet 0.25 mg, 0.25 mg, Oral, BID PRN, Tommy Linares MD    ascorbic acid (VITAMIN C) tablet 500 mg, 500 mg, Oral, Daily, Tommy Linares MD    bumetanide (BUMEX) tablet 1 mg, 1 mg, Oral, BID, Harman Black MD    cholecalciferol (VITAMIN D3) tablet 1,000 Units, 1,000 Units, Oral, Daily, Tommy Linares MD    cloNIDine (CATAPRES-TTS) 0.1 MG/24HR patch 1 patch, 1 patch, Transdermal, Weekly, Tommy Linares MD    dextrose (D50W) 25 g/ 50mL Intravenous Solution 25 g, 25 g, Intravenous, Q15 Min PRN, Tommy Linares MD    dextrose (GLUTOSE) oral gel 15 g, 15 g, Oral, Q15 Min PRN, Tommy Linares MD    epoetin mindy-epbx (RETACRIT) injection 10,000 Units, 10,000 Units, Subcutaneous, Once per day on Mon Wed Fri, Tommy Linares MD    fluconazole (DIFLUCAN) tablet 200 mg, 200 mg, Oral, Q24H, Tommy Linares MD    glucagon (human recombinant) (GLUCAGEN DIAGNOSTIC) injection 1 mg, 1 mg, Subcutaneous, Q15 Min PRN, Tommy Linares  MD    heparin (porcine) injection 3,600 Units, 3,600 Units, Intracatheter, PRN, Tommy Linares MD    hydrALAZINE (APRESOLINE) injection 10 mg, 10 mg, Intravenous, Q6H PRN, Tommy Linares MD    hydrocortisone-bacitracin-zinc oxide-nystatin (MAGIC BARRIER) ointment 1 application, 1 application, Topical, TID, Dea Puentes MD    labetalol (NORMODYNE,TRANDATE) injection 20 mg, 20 mg, Intravenous, Q6H PRN, Tommy Linares MD    lidocaine (LIDODERM) 5 % 1 patch, 1 patch, Transdermal, Q24H, Tommy Linares MD    lidocaine (LIDODERM) 5 % 1 patch, 1 patch, Transdermal, Q24H, Tommy Linares MD    metoprolol tartrate (LOPRESSOR) tablet 50 mg, 50 mg, Oral, Q12H, Elmer Johnson MD    naloxone (NARCAN) injection 0.2 mg, 0.2 mg, Intravenous, PRN, Tommy Linares MD    ondansetron (ZOFRAN) injection 4 mg, 4 mg, Intravenous, Q6H PRN, Tommy Linares MD    ondansetron (ZOFRAN) tablet 4 mg, 4 mg, Oral, Q6H PRN, Tommy Linares MD    pantoprazole (PROTONIX) EC tablet 40 mg, 40 mg, Oral, Q AM, Tommy Linares MD    potassium chloride (MICRO-K) CR capsule 40 mEq, 40 mEq, Oral, Daily, Elmer Johnson MD    potassium chloride 10 mEq in 100 mL IVPB, 10 mEq, Intravenous, Q1H, Tommy Linares MD    saccharomyces boulardii (FLORASTOR) capsule 250 mg, 250 mg, Oral, BID, Tommy Linares MD    sennosides-docusate (PERICOLACE) 8.6-50 MG per tablet 1 tablet, 1 tablet, Oral, BID PRN, Tommy Linares MD    sodium chloride 0.9 % flush 10 mL, 10 mL, Intravenous, Q12H, Tommy Linares MD    sodium chloride 0.9 % flush 10 mL, 10 mL, Intravenous, PRN, Tommy Linares MD    sodium chloride injection 40 mEq, 10 mL, Intravenous, Q5 Min PRN, Tommy Linares MD    sodium citrate 4% anticoagulant solution, 4 mL, Intracatheter, PRN, Tommy Linares MD    SUMAtriptan (IMITREX) tablet 50 mg, 50 mg, Oral, Once, Tommy Linares  "MD Gomez    zinc sulfate (ZINCATE) capsule 220 mg, 220 mg, Oral, Daily, Tommy Linares MD    Data:     Code Status:    There are no questions and answers to display.       Family History   Problem Relation Age of Onset    Colon cancer Maternal Aunt 52    Fibromyalgia Mother     Heart disease Mother     Hypertension Mother     Hodgkin's lymphoma Paternal Grandmother     Ovarian cancer Neg Hx     Breast cancer Neg Hx        Social History     Socioeconomic History    Marital status:      Spouse name: Not on file    Number of children: Not on file    Years of education: Not on file    Highest education level: Not on file   Tobacco Use    Smoking status: Never Smoker    Smokeless tobacco: Never Used   Substance and Sexual Activity    Alcohol use: No    Drug use: No       Vitals:  Ht 170.2 cm (67\")   Wt 101 kg (221 lb 11.2 oz)   LMP  (LMP Unknown)   BMI 34.72 kg/m²   T 98.9 P 97 R 23 /83 Sp02 98% (room air)          I/O (24Hr):  No intake or output data in the 24 hours ending 10/02/21 1054    Labs and imaging:      Recent Results (from the past 12 hour(s))   Basic Metabolic Panel    Collection Time: 10/02/21  4:47 AM    Specimen: Blood   Result Value Ref Range    Glucose 110 (H) 65 - 99 mg/dL    BUN 17 6 - 20 mg/dL    Creatinine 1.13 (H) 0.57 - 1.00 mg/dL    Sodium 142 136 - 145 mmol/L    Potassium 2.5 (C) 3.5 - 5.2 mmol/L    Chloride 104 98 - 107 mmol/L    CO2 26.0 22.0 - 29.0 mmol/L    Calcium 9.3 8.6 - 10.5 mg/dL    eGFR Non African Amer 52 (L) >60 mL/min/1.73    BUN/Creatinine Ratio 15.0 7.0 - 25.0    Anion Gap 12.0 5.0 - 15.0 mmol/L   CBC Auto Differential    Collection Time: 10/02/21  4:47 AM    Specimen: Blood   Result Value Ref Range    WBC 9.29 3.40 - 10.80 10*3/mm3    RBC 3.07 (L) 3.77 - 5.28 10*6/mm3    Hemoglobin 9.3 (L) 12.0 - 15.9 g/dL    Hematocrit 29.5 (L) 34.0 - 46.6 %    MCV 96.1 79.0 - 97.0 fL    MCH 30.3 26.6 - 33.0 pg    MCHC 31.5 31.5 - 35.7 g/dL    RDW 16.6 (H) 12.3 - 15.4 " %    RDW-SD 56.9 (H) 37.0 - 54.0 fl    MPV 9.6 6.0 - 12.0 fL    Platelets 406 140 - 450 10*3/mm3    Neutrophil % 73.8 42.7 - 76.0 %    Lymphocyte % 10.3 (L) 19.6 - 45.3 %    Monocyte % 8.4 5.0 - 12.0 %    Eosinophil % 3.6 0.3 - 6.2 %    Basophil % 0.8 0.0 - 1.5 %    Immature Grans % 3.1 (H) 0.0 - 0.5 %    Neutrophils, Absolute 6.86 1.70 - 7.00 10*3/mm3    Lymphocytes, Absolute 0.96 0.70 - 3.10 10*3/mm3    Monocytes, Absolute 0.78 0.10 - 0.90 10*3/mm3    Eosinophils, Absolute 0.33 0.00 - 0.40 10*3/mm3    Basophils, Absolute 0.07 0.00 - 0.20 10*3/mm3    Immature Grans, Absolute 0.29 (H) 0.00 - 0.05 10*3/mm3    nRBC 0.0 0.0 - 0.2 /100 WBC   Magnesium    Collection Time: 10/02/21  4:47 AM    Specimen: Blood   Result Value Ref Range    Magnesium 2.7 (H) 1.6 - 2.6 mg/dL                 Physical Examination:        Physical Exam  Vitals and nursing note reviewed.   Constitutional:       Appearance: Normal appearance.   HENT:      Head: Normocephalic.      Right Ear: External ear normal.      Left Ear: External ear normal.      Nose: Nose normal.      Mouth/Throat:      Mouth: Mucous membranes are moist.      Pharynx: Oropharynx is clear.   Eyes:      Extraocular Movements: Extraocular movements intact.      Conjunctiva/sclera: Conjunctivae normal.      Pupils: Pupils are equal, round, and reactive to light.   Cardiovascular:      Rate and Rhythm: Normal rate and regular rhythm.      Heart sounds: Murmur heard.     Pulmonary:      Effort: Pulmonary effort is normal.      Breath sounds: Normal breath sounds.   Abdominal:      General: Bowel sounds are normal. There is distension.      Palpations: Abdomen is soft.   Musculoskeletal:      Cervical back: Normal range of motion and neck supple.      Comments: Generalized weakness   Skin:     General: Skin is warm and dry.   Neurological:      Mental Status: She is alert and oriented to person, place, and time.   Psychiatric:         Mood and Affect: Mood normal.          Behavior: Behavior normal.           Assessment:            * No active hospital problems. *    Past Medical History:   Diagnosis Date    Angina at rest (CMS/HCC)     Migraine     Sees Pain Management for injections.     MVP (mitral valve prolapse)     Syncope         Plan:        Acute RLL pulmonary embolism  S/p COVID 19  Acute renal failure  Rectus sheath and pelvic hematoma  Multifactorial anemia  MVP  HTN  Hypokalemia    Continue current treatment. Monitor counts. Increase activity as tolerated. Aggressive therapies.  Monitor renal function. Labs Monday. Continue potassium replacement. Check stool.  Again today advised patient that resuming birth control at this point with a known PE is not in her best interests.  May give ice chips and sips of water and then slowly progress diet as tolerated.  Will repeat KUB in the a.m. and then consider CT scan of the abdomen and pelvis if warranted or if becomes symptomatic  or intolerant to slow progression of oral intake.    Electronically signed by BERT Lassiter on 10/2/2021 at 10:54 CDT   I have discussed the care of Namita Zabala, including pertinent history and exam findings, with the nurse practitioner.  I agree with the assessment, plan and orders as documented by BERT Altamirano, after I modified the exam findings and the plan of treatments and the final version is my approved version of the assessment.        Electronically signed by Tommy Linares MD on 10/2/2021 at 22:57 CDT

## 2021-10-03 ENCOUNTER — APPOINTMENT (OUTPATIENT)
Dept: GENERAL RADIOLOGY | Facility: HOSPITAL | Age: 46
End: 2021-10-03

## 2021-10-03 ENCOUNTER — APPOINTMENT (OUTPATIENT)
Dept: CT IMAGING | Facility: HOSPITAL | Age: 46
End: 2021-10-03

## 2021-10-03 LAB
ANION GAP SERPL CALCULATED.3IONS-SCNC: 10 MMOL/L (ref 5–15)
BUN SERPL-MCNC: 16 MG/DL (ref 6–20)
BUN/CREAT SERPL: 17.4 (ref 7–25)
CALCIUM SPEC-SCNC: 9.5 MG/DL (ref 8.6–10.5)
CHLORIDE SERPL-SCNC: 104 MMOL/L (ref 98–107)
CO2 SERPL-SCNC: 23 MMOL/L (ref 22–29)
CREAT SERPL-MCNC: 0.92 MG/DL (ref 0.57–1)
GFR SERPL CREATININE-BSD FRML MDRD: 66 ML/MIN/1.73
GLUCOSE SERPL-MCNC: 104 MG/DL (ref 65–99)
POTASSIUM SERPL-SCNC: 3 MMOL/L (ref 3.5–5.2)
SODIUM SERPL-SCNC: 137 MMOL/L (ref 136–145)

## 2021-10-03 PROCEDURE — 97110 THERAPEUTIC EXERCISES: CPT

## 2021-10-03 PROCEDURE — 25010000003 POTASSIUM CHLORIDE 10 MEQ/100ML SOLUTION: Performed by: INTERNAL MEDICINE

## 2021-10-03 PROCEDURE — 80048 BASIC METABOLIC PNL TOTAL CA: CPT | Performed by: INTERNAL MEDICINE

## 2021-10-03 PROCEDURE — 97530 THERAPEUTIC ACTIVITIES: CPT

## 2021-10-03 PROCEDURE — 25010000002 IOPAMIDOL 61 % SOLUTION: Performed by: INTERNAL MEDICINE

## 2021-10-03 PROCEDURE — 25010000003 POTASSIUM CHLORIDE 10 MEQ/100ML SOLUTION: Performed by: NURSE PRACTITIONER

## 2021-10-03 PROCEDURE — 25010000002 ONDANSETRON PER 1 MG: Performed by: INTERNAL MEDICINE

## 2021-10-03 PROCEDURE — 25010000002 IOPAMIDOL 61 % SOLUTION: Performed by: NURSE PRACTITIONER

## 2021-10-03 PROCEDURE — 74177 CT ABD & PELVIS W/CONTRAST: CPT

## 2021-10-03 RX ORDER — POTASSIUM CHLORIDE 7.45 MG/ML
10 INJECTION INTRAVENOUS
Status: DISPENSED | OUTPATIENT
Start: 2021-10-03 | End: 2021-10-04

## 2021-10-03 RX ADMIN — IOPAMIDOL 100 ML: 612 INJECTION, SOLUTION INTRAVENOUS at 20:27

## 2021-10-03 NOTE — PROGRESS NOTES
PROGRESS NOTE.      Patient:  Namita Zabala  YOB: 1975  Date of Service: 10/3/2021  MRN: [id number]   Acct: [id number]   Primary Care Physician: David Lara MD  Advance Directive:   There are no questions and answers to display.     Admit Date: 9/24/2021       Hospital Day: 0  Referring Provider: Tommy Linares MD      Patient Seen, Chart, Consults, Notes, Labs, Radiology studies reviewed.    Chief complaint: Acute kidney injury/dialysis dependent.    Subjective:  Namita Zabala is a 46 y.o. female  whom we were consulted for acute kidney injury.  Patient presented on 8/27 with dyspnea, hypoxia. Diagnosed with COVID-19 pneumonia. Had a CT angiogram which showed right pulmonary embolism. Intubated on 8/27. Renal function has been steadily declining since 9/2.  Hemoglobin dropped to <6 on 9/04 and imaging revealed large retroperitoneal hematoma causing bilateral hydronephrosis and shifting of bladder. Her renal function did not improve with IV fluids. Dr Kaur placed vascath on 9/08 and patient had first dialysis the same day. Extubated on 9/10. She later had permcath placed on 9/16, femoral line removed same day. Moved to medical floor but has returned to CCU for fever, tachycardia, chest pain. Patient has an NG tube. She got stable and was moved back to the medical floor.   On September 24 she was moved to long-term acute care hospital for further care that include rehabilitation and continuation of dialysis.  On September 29, her hemodialysis treatment has been discontinued as she has shown good renal recovery.  On October 1 her permacath was removed.    This morning she feels well.  She denies any nausea vomiting.  She is scheduled for CT scan of the abdomen.  Patient still has hypokalemia    Allergies:  Coconut, Nuts, and Penicillins    Home Meds:  Medications Prior to Admission   Medication Sig Dispense Refill Last Dose   • acetaminophen (TYLENOL) 325 MG tablet  Take 2 tablets by mouth Every 6 (Six) Hours As Needed for Mild Pain , Fever or Headache (fever greater than 101.5 F or headache).      • [] ALPRAZolam (XANAX) 0.25 MG tablet Take 1 tablet by mouth 2 (Two) Times a Day As Needed for Anxiety for up to 5 days.      • bisacodyl (DULCOLAX) 10 MG suppository Insert 1 suppository into the rectum Daily.      • cloNIDine (CATAPRES-TTS) 0.1 MG/24HR patch Place 1 patch on the skin as directed by provider 1 (One) Time Per Week.      • dextrose (D50W) 50 % solution Infuse 50 mL into a venous catheter Every 15 (Fifteen) Minutes As Needed for Low Blood Sugar (Blood Sugar Less Than 70).      • dextrose (GLUTOSE) 40 % gel Take 15 g by mouth Every 15 (Fifteen) Minutes As Needed for Low Blood Sugar (Blood sugar less than 70).      • epoetin mindy-epbx (RETACRIT) 11543 UNIT/ML injection Inject 1 mL under the skin into the appropriate area as directed 3 (Three) Times a Week. Indications: ESRD on Dialysis 6.6 mL     • fluconazole (DIFLUCAN) 200-0.9 MG/100ML-% IVPB Infuse 100 mL into a venous catheter Daily for 10 doses. Indications: Urinary Tract Infection 1000 mL 0    • glucagon, human recombinant, (GLUCAGEN DIAGNOSTIC) 1 MG injection Inject 1 mg under the skin into the appropriate area as directed Every 15 (Fifteen) Minutes As Needed (Blood Glucose Less Than 70) for up to 360 days.      • Heparin Sodium, Porcine, (heparin, porcine,) 1000 UNIT/ML injection 3.6 mL by Intracatheter route As Needed (after dialysis). Indications: Other - full anticoagulation      • insulin regular (humuLIN R,novoLIN R) 100 UNIT/ML injection Inject 0-9 Units under the skin into the appropriate area as directed Every 6 (Six) Hours.  12    • lidocaine (LIDODERM) 5 % Place 1 patch on the skin as directed by provider Daily. Remove & Discard patch within 12 hours or as directed by MD      • lidocaine (LIDODERM) 5 % Place 1 patch on the skin as directed by provider Daily. Remove & Discard patch within 12  hours or as directed by MD      • metoprolol tartrate (LOPRESSOR) 25 MG tablet Take 1 tablet by mouth Every 12 (Twelve) Hours.      • ondansetron (ZOFRAN) 4 MG/2ML injection Infuse 2 mL into a venous catheter Every 6 (Six) Hours As Needed for Nausea or Vomiting.      • pantoprazole (PROTONIX) 40 MG EC tablet Take 1 tablet by mouth Every Morning.      • sennosides-docusate (PERICOLACE) 8.6-50 MG per tablet Take 1 tablet by mouth 2 (Two) Times a Day.          Medicines:  Current Facility-Administered Medications   Medication Dose Route Frequency Provider Last Rate Last Admin   • acetaminophen (TYLENOL) tablet 650 mg  650 mg Oral Q6H PRN Tommy Linares MD        Or   • acetaminophen (TYLENOL) suppository 650 mg  650 mg Rectal Q6H PRN Tommy Linares MD       • acetaminophen (TYLENOL) tablet 1,000 mg  1,000 mg Oral Q4H PRN Tommy Linares MD       • ALPRAZolam (XANAX) tablet 0.25 mg  0.25 mg Oral BID PRN Tommy Linares MD       • ascorbic acid (VITAMIN C) tablet 500 mg  500 mg Oral Daily Tommy Linares MD       • bumetanide (BUMEX) tablet 1 mg  1 mg Oral Daily Conchita Leal MD       • cholecalciferol (VITAMIN D3) tablet 1,000 Units  1,000 Units Oral Daily Tommy Linares MD       • cloNIDine (CATAPRES-TTS) 0.1 MG/24HR patch 1 patch  1 patch Transdermal Weekly Tommy Linares MD       • dextrose (D50W) 25 g/ 50mL Intravenous Solution 25 g  25 g Intravenous Q15 Min PRN Tommy Linares MD       • dextrose (GLUTOSE) oral gel 15 g  15 g Oral Q15 Min PRN Tommy Linares MD       • epoetin mindy-epbx (RETACRIT) injection 10,000 Units  10,000 Units Subcutaneous Once per day on Mon Wed Fri Tommy Linares MD       • fluconazole (DIFLUCAN) tablet 200 mg  200 mg Oral Q24H Tommy Linares MD       • glucagon (human recombinant) (GLUCAGEN DIAGNOSTIC) injection 1 mg  1 mg Subcutaneous Q15 Min PRN Tommy Linares MD       • heparin (porcine)  injection 3,600 Units  3,600 Units Intracatheter PRN Tommy Linares MD       • hydrALAZINE (APRESOLINE) injection 10 mg  10 mg Intravenous Q6H PRN Tommy Linares MD       • hydrocortisone-bacitracin-zinc oxide-nystatin (MAGIC BARRIER) ointment 1 application  1 application Topical TID Dea Puentes MD       • labetalol (NORMODYNE,TRANDATE) injection 20 mg  20 mg Intravenous Q6H PRN Tommy Linares MD       • lidocaine (LIDODERM) 5 % 1 patch  1 patch Transdermal Q24H Tommy Linares MD       • lidocaine (LIDODERM) 5 % 1 patch  1 patch Transdermal Q24H Tommy Linares MD       • metoprolol tartrate (LOPRESSOR) tablet 50 mg  50 mg Oral Q12H Elmer Johnson MD       • naloxone (NARCAN) injection 0.2 mg  0.2 mg Intravenous PRN Tommy Linares MD       • ondansetron (ZOFRAN) injection 4 mg  4 mg Intravenous Q6H PRN Tommy Linares MD       • ondansetron (ZOFRAN) tablet 4 mg  4 mg Oral Q6H PRN Tommy Linares MD       • pantoprazole (PROTONIX) EC tablet 40 mg  40 mg Oral Q AM Tommy Linares MD       • potassium chloride (MICRO-K) CR capsule 40 mEq  40 mEq Oral Daily Elmer Johnson MD       • saccharomyces boulardii (FLORASTOR) capsule 250 mg  250 mg Oral BID Tommy Linares MD       • sennosides-docusate (PERICOLACE) 8.6-50 MG per tablet 1 tablet  1 tablet Oral BID PRN Tommy Linares MD       • sodium chloride 0.9 % flush 10 mL  10 mL Intravenous Q12H Tommy Linares MD       • sodium chloride 0.9 % flush 10 mL  10 mL Intravenous PRN Tommy Linares MD       • sodium chloride injection 40 mEq  10 mL Intravenous Q5 Min PRN Tommy Linares MD       • sodium citrate 4% anticoagulant solution  4 mL Intracatheter PRN Tommy Linares MD       • spironolactone (ALDACTONE) tablet 25 mg  25 mg Oral Conchita Leal MD       • SUMAtriptan (IMITREX) tablet 50 mg  50 mg Oral Once Tommy Linares  MD Gomez       • zinc sulfate (ZINCATE) capsule 220 mg  220 mg Oral Daily Tommy Linares MD           Past Medical History:  Past Medical History:   Diagnosis Date   • Angina at rest (CMS/HCC)    • Migraine     Sees Pain Management for injections.    • MVP (mitral valve prolapse)    • Syncope        Past Surgical History:  Past Surgical History:   Procedure Laterality Date   • BREAST BIOPSY Right 2011    benign   • INSERTION HEMODIALYSIS CATHETER Left 9/16/2021    Procedure: HEMODIALYSIS CATHETER INSERTION;  Surgeon: Anders Kaur MD;  Location: [place] [address];  Service: Vascular;  Laterality: Left;   • TONSILLECTOMY         Family History  Family History   Problem Relation Age of Onset   • Colon cancer Maternal Aunt 52   • Fibromyalgia Mother    • Heart disease Mother    • Hypertension Mother    • Hodgkin's lymphoma Paternal Grandmother    • Ovarian cancer Neg Hx    • Breast cancer Neg Hx        Social History  Social History     Socioeconomic History   • Marital status:      Spouse name: Not on file   • Number of children: Not on file   • Years of education: Not on file   • Highest education level: Not on file   Tobacco Use   • Smoking status: Never Smoker   • Smokeless tobacco: Never Used   Substance and Sexual Activity   • Alcohol use: No   • Drug use: No       Review of Systems:  History obtained from chart review and the patient  General ROS: No fever or chills  Respiratory ROS: No cough, +shortness of breath, -wheezing  Cardiovascular ROS: no chest pain but dyspnea on exertion  Gastrointestinal ROS: No abdominal pain or melena  Genito-Urinary ROS: No dysuria or hematuria  Musculoskeletal: negative  Skin: negative    Objective:  Blood pressure: 125/78 mmHg  Heart rate: 104/min.  O2 saturation 96%.    Physical examination:  General: awake/alert    Head: Atraumatic, normocephalic  Neck: No masses, no jugular venous distention  Chest:  clear to auscultation bilaterally without  respiratory distress  CVS: regular rate and rhythm  Abdominal: soft, nontender, normal bowel sounds  Extremities: 2+ pedal edema  Skin: warm and dry without rash  Neuro: No focal motor deficits      Labs:  Lab Results (last 24 hours)     Procedure Component Value Units Date/Time    Basic Metabolic Panel [[id number]]  (Abnormal) Collected: 10/03/21 0614    Specimen: Blood Updated: 10/03/21 0702     Glucose 104 mg/dL      BUN 16 mg/dL      Creatinine 0.92 mg/dL      Sodium 137 mmol/L      Potassium 3.0 mmol/L      Chloride 104 mmol/L      CO2 23.0 mmol/L      Calcium 9.5 mg/dL      eGFR Non African Amer 66 mL/min/1.73      BUN/Creatinine Ratio 17.4     Anion Gap 10.0 mmol/L     Narrative:      GFR Normal >60  Chronic Kidney Disease <60  Kidney Failure <15      Magnesium [[id number]]  (Normal) Collected: 10/02/21 1748    Specimen: Blood Updated: 10/02/21 1901     Magnesium 2.2 mg/dL           Radiology:   Imaging Results (Last 24 Hours)     Procedure Component Value Units Date/Time    XR Abdomen KUB [[id number]] Collected: 10/03/21 0856     Updated: 10/03/21 0903    Narrative:      EXAMINATION:  XR ABDOMEN KUB-  10/2/2021 10:24 PM CDT     HISTORY: Nausea and vomiting      COMPARISON: 10/1/2021 and 9/20/2021 KUB     TECHNIQUE: Single view: KUB     FINDINGS:      A moderate amount of gaseous distention of bowel loops appreciated,  colon with small bowel loops centrally imaged similarly on yesterday's  examination.     Differential considerations include ileus. Distal colonic obstruction  not excluded.       Impression:      1. Persistent gaseous distention of bowel likely unchanged compared to  yesterday's examination.  This report was finalized on 10/03/2021 09:00 by Dr. Oskar Teresa MD.              Assessment   1.  Acute kidney injury/stage III/improved/off dialysis.  2.  Obstructive uropathy/improved.  3.  Left pelvic hematoma compressing bladder.  4.  Acute hypoxemic respiratory failure now resolved.  5.  Bilateral  COVID-19 pneumonia improved.  6.  Pulmonary embolism   7.  Metabolic acidosis--improved  9.  IV contrast dye exposure  10.  Hypokalemia.    Plan:  1.  Continue Aldactone.  2.  DC Bumex.  3.  Potassium supplement as needed  4.  Continue Lopressor.        Harman Black MD  10/3/2021  13:35 CDT

## 2021-10-03 NOTE — PROGRESS NOTES
Vijay Magda Linares M.D.  [facility]        Internal Medicine Progress Note    10/3/2021   13:20 CDT    Name:  Namita Zabala  MRN:    [id number]     Acct:     [id number]   Room:  [address] [place] [address] Day: 0     Admit Date: 9/24/2021  4:31 PM  PCP: David Lara MD    Subjective:     C/C: weakness    Interval History: Status:  stayed the same.  Resting in bed. No family at bedside.  Afebrile.  Remains on room air and tolerating without difficulty.  Continues to have generalized weakness.  Reported nausea with medication, repeat KUB last night reported persistent gaseous distention unchanged with differential including ileus and distal colonic obstruction.  We will move forward with a CT scan of the abdomen and pelvis.  Continues to require IV potassium replacement, potassium 3.0 today.  Abdomen distention has improved and abdomen is soft.  Will be n.p.o. until after CT scan of the abdomen and pelvis has been reviewed.  Slow progression with therapy.      Review of Systems   Constitutional: Positive for malaise/fatigue. Negative for chills, decreased appetite, weight gain and weight loss.   HENT: Negative for congestion, ear discharge, hoarse voice and tinnitus.    Eyes: Negative for blurred vision, discharge, visual disturbance and visual halos.   Cardiovascular: Negative for chest pain, claudication, dyspnea on exertion, irregular heartbeat, leg swelling, orthopnea and paroxysmal nocturnal dyspnea.   Respiratory: Negative for cough, shortness of breath, sputum production and wheezing.    Endocrine: Negative for cold intolerance, heat intolerance and polyuria.   Hematologic/Lymphatic: Negative for adenopathy. Does not bruise/bleed easily.   Skin: Negative for dry skin, itching and suspicious lesions.   Musculoskeletal: Negative for arthritis, back pain, falls, joint pain, muscle weakness and myalgias.   Gastrointestinal: Positive for nausea. Negative for abdominal pain, constipation,  diarrhea, dysphagia and hematemesis.   Genitourinary: Negative for bladder incontinence, dysuria and frequency.   Neurological: Positive for weakness. Negative for aphonia, disturbances in coordination and dizziness.   Psychiatric/Behavioral: Negative for altered mental status, depression, memory loss and substance abuse. The patient does not have insomnia and is not nervous/anxious.          Medications:     Allergies:   Allergies   Allergen Reactions   • Coconut Unknown - High Severity   • Nuts Unknown - High Severity   • Penicillins        Current Meds:   Current Facility-Administered Medications:   •  acetaminophen (TYLENOL) tablet 650 mg, 650 mg, Oral, Q6H PRN **OR** acetaminophen (TYLENOL) suppository 650 mg, 650 mg, Rectal, Q6H PRN, Tommy Linares MD  •  acetaminophen (TYLENOL) tablet 1,000 mg, 1,000 mg, Oral, Q4H PRN, Tommy Linares MD  •  ALPRAZolam (XANAX) tablet 0.25 mg, 0.25 mg, Oral, BID PRN, Tommy Linares MD  •  ascorbic acid (VITAMIN C) tablet 500 mg, 500 mg, Oral, Daily, Tommy Linares MD  •  bumetanide (BUMEX) tablet 1 mg, 1 mg, Oral, Daily, Conchita Leal MD  •  cholecalciferol (VITAMIN D3) tablet 1,000 Units, 1,000 Units, Oral, Daily, Tommy Linares MD  •  cloNIDine (CATAPRES-TTS) 0.1 MG/24HR patch 1 patch, 1 patch, Transdermal, Weekly, Tommy Linares MD  •  dextrose (D50W) 25 g/ 50mL Intravenous Solution 25 g, 25 g, Intravenous, Q15 Min PRN, Tommy Linares MD  •  dextrose (GLUTOSE) oral gel 15 g, 15 g, Oral, Q15 Min PRN, Tommy Linares MD  •  epoetin mindy-epbx (RETACRIT) injection 10,000 Units, 10,000 Units, Subcutaneous, Once per day on Mon Wed Fri, Tommy Linares MD  •  fluconazole (DIFLUCAN) tablet 200 mg, 200 mg, Oral, Q24H, Tommy Linares MD  •  glucagon (human recombinant) (GLUCAGEN DIAGNOSTIC) injection 1 mg, 1 mg, Subcutaneous, Q15 Min PRN, Tommy Linares MD  •  heparin (porcine) injection 3,600  Units, 3,600 Units, Intracatheter, PRN, Tommy Linares MD  •  hydrALAZINE (APRESOLINE) injection 10 mg, 10 mg, Intravenous, Q6H PRN, Tommy Linares MD  •  hydrocortisone-bacitracin-zinc oxide-nystatin (MAGIC BARRIER) ointment 1 application, 1 application, Topical, TID, Dea Puentes MD  •  labetalol (NORMODYNE,TRANDATE) injection 20 mg, 20 mg, Intravenous, Q6H PRN, Tommy Linares MD  •  lidocaine (LIDODERM) 5 % 1 patch, 1 patch, Transdermal, Q24H, Tommy Linares MD  •  lidocaine (LIDODERM) 5 % 1 patch, 1 patch, Transdermal, Q24H, Tommy Linares MD  •  metoprolol tartrate (LOPRESSOR) tablet 50 mg, 50 mg, Oral, Q12H, Elmer Johnson MD  •  naloxone (NARCAN) injection 0.2 mg, 0.2 mg, Intravenous, PRN, Tommy Linares MD  •  ondansetron (ZOFRAN) injection 4 mg, 4 mg, Intravenous, Q6H PRN, Tommy Linares MD  •  ondansetron (ZOFRAN) tablet 4 mg, 4 mg, Oral, Q6H PRN, Tommy Linares MD  •  pantoprazole (PROTONIX) EC tablet 40 mg, 40 mg, Oral, Q AM, Tommy Linares MD  •  potassium chloride (MICRO-K) CR capsule 40 mEq, 40 mEq, Oral, Daily, Elmer Johnson MD  •  saccharomyces boulardii (FLORASTOR) capsule 250 mg, 250 mg, Oral, BID, Tommy Linares MD  •  sennosides-docusate (PERICOLACE) 8.6-50 MG per tablet 1 tablet, 1 tablet, Oral, BID PRN, Tommy Linares MD  •  sodium chloride 0.9 % flush 10 mL, 10 mL, Intravenous, Q12H, Tommy Linares MD  •  sodium chloride 0.9 % flush 10 mL, 10 mL, Intravenous, PRN, Tommy Linares MD  •  sodium chloride injection 40 mEq, 10 mL, Intravenous, Q5 Min PRN, Tommy Linares MD  •  sodium citrate 4% anticoagulant solution, 4 mL, Intracatheter, PRN, Tommy Linares MD  •  spironolactone (ALDACTONE) tablet 25 mg, 25 mg, Oral, QAM, SensConchita siegel MD  •  SUMAtriptan (IMITREX) tablet 50 mg, 50 mg, Oral, Once, Tommy Linares MD  •  zinc sulfate (ZINCATE)  "capsule 220 mg, 220 mg, Oral, Daily, Tommy Linares MD    Data:     Code Status:    There are no questions and answers to display.       Family History   Problem Relation Age of Onset   • Colon cancer Maternal Aunt 52   • Fibromyalgia Mother    • Heart disease Mother    • Hypertension Mother    • Hodgkin's lymphoma Paternal Grandmother    • Ovarian cancer Neg Hx    • Breast cancer Neg Hx        Social History     Socioeconomic History   • Marital status:      Spouse name: Not on file   • Number of children: Not on file   • Years of education: Not on file   • Highest education level: Not on file   Tobacco Use   • Smoking status: Never Smoker   • Smokeless tobacco: Never Used   Substance and Sexual Activity   • Alcohol use: No   • Drug use: No       Vitals:  Ht 170.2 cm (67\")   Wt 101 kg (221 lb 11.2 oz)   LMP  (LMP Unknown)   BMI 34.72 kg/m²   T 98.6 P 104 R 26 /87 Sp02 96% (room air)    I/O (24Hr):  No intake or output data in the 24 hours ending 10/03/21 1320    Labs and imaging:      Recent Results (from the past 12 hour(s))   Basic Metabolic Panel    Collection Time: 10/03/21  6:14 AM    Specimen: Blood   Result Value Ref Range    Glucose 104 (H) 65 - 99 mg/dL    BUN 16 6 - 20 mg/dL    Creatinine 0.92 0.57 - 1.00 mg/dL    Sodium 137 136 - 145 mmol/L    Potassium 3.0 (L) 3.5 - 5.2 mmol/L    Chloride 104 98 - 107 mmol/L    CO2 23.0 22.0 - 29.0 mmol/L    Calcium 9.5 8.6 - 10.5 mg/dL    eGFR Non African Amer 66 >60 mL/min/1.73    BUN/Creatinine Ratio 17.4 7.0 - 25.0    Anion Gap 10.0 5.0 - 15.0 mmol/L                 Physical Examination:        Physical Exam  Vitals and nursing note reviewed.   Constitutional:       Appearance: Normal appearance.   HENT:      Head: Normocephalic.      Right Ear: External ear normal.      Left Ear: External ear normal.      Nose: Nose normal.      Mouth/Throat:      Mouth: Mucous membranes are moist.      Pharynx: Oropharynx is clear.   Eyes:      Extraocular " Movements: Extraocular movements intact.      Conjunctiva/sclera: Conjunctivae normal.      Pupils: Pupils are equal, round, and reactive to light.   Cardiovascular:      Rate and Rhythm: Normal rate and regular rhythm.      Heart sounds: Murmur heard.     Pulmonary:      Effort: Pulmonary effort is normal.      Breath sounds: Normal breath sounds.   Abdominal:      General: Bowel sounds are normal. There is distension.      Palpations: Abdomen is soft.   Musculoskeletal:      Cervical back: Normal range of motion and neck supple.      Comments: Generalized weakness   Skin:     General: Skin is warm and dry.   Neurological:      Mental Status: She is alert and oriented to person, place, and time.   Psychiatric:         Mood and Affect: Mood normal.         Behavior: Behavior normal.           Assessment:            * No active hospital problems. *    Past Medical History:   Diagnosis Date   • Angina at rest (CMS/HCC)    • Migraine     Sees Pain Management for injections.    • MVP (mitral valve prolapse)    • Syncope         Plan:        1. Acute RLL pulmonary embolism  2. S/p COVID 19  3. Acute renal failure  4. Rectus sheath and pelvic hematoma  5. Multifactorial anemia  6. MVP  7. HTN  8. Hypokalemia    Continue current treatment. Monitor counts. Increase activity as tolerated. Aggressive therapies.  Monitor renal function. Labs Monday. Continue potassium replacement. Check stool.  Change back to n.p.o. and obtain CT scan of the abdomen and pelvis with contrast.      Electronically signed by BERT Lassiter on 10/3/2021 at 13:20 CDT

## 2021-10-04 LAB
ALBUMIN SERPL-MCNC: 3 G/DL (ref 3.5–5.2)
ALBUMIN/GLOB SERPL: 1 G/DL
ALP SERPL-CCNC: 91 U/L (ref 39–117)
ALT SERPL W P-5'-P-CCNC: 20 U/L (ref 1–33)
ANION GAP SERPL CALCULATED.3IONS-SCNC: 13 MMOL/L (ref 5–15)
AST SERPL-CCNC: 23 U/L (ref 1–32)
BASOPHILS # BLD AUTO: 0.1 10*3/MM3 (ref 0–0.2)
BASOPHILS NFR BLD AUTO: 0.9 % (ref 0–1.5)
BILIRUB SERPL-MCNC: 0.6 MG/DL (ref 0–1.2)
BUN SERPL-MCNC: 13 MG/DL (ref 6–20)
BUN/CREAT SERPL: 14.4 (ref 7–25)
CALCIUM SPEC-SCNC: 9.7 MG/DL (ref 8.6–10.5)
CHLORIDE SERPL-SCNC: 105 MMOL/L (ref 98–107)
CO2 SERPL-SCNC: 22 MMOL/L (ref 22–29)
CREAT SERPL-MCNC: 0.9 MG/DL (ref 0.57–1)
DEPRECATED RDW RBC AUTO: 56.3 FL (ref 37–54)
EOSINOPHIL # BLD AUTO: 0.32 10*3/MM3 (ref 0–0.4)
EOSINOPHIL NFR BLD AUTO: 3 % (ref 0.3–6.2)
ERYTHROCYTE [DISTWIDTH] IN BLOOD BY AUTOMATED COUNT: 15.7 % (ref 12.3–15.4)
GFR SERPL CREATININE-BSD FRML MDRD: 68 ML/MIN/1.73
GLOBULIN UR ELPH-MCNC: 3.1 GM/DL
GLUCOSE SERPL-MCNC: 101 MG/DL (ref 65–99)
HCT VFR BLD AUTO: 31.2 % (ref 34–46.6)
HGB BLD-MCNC: 9.7 G/DL (ref 12–15.9)
IMM GRANULOCYTES # BLD AUTO: 0.26 10*3/MM3 (ref 0–0.05)
IMM GRANULOCYTES NFR BLD AUTO: 2.4 % (ref 0–0.5)
LYMPHOCYTES # BLD AUTO: 0.95 10*3/MM3 (ref 0.7–3.1)
LYMPHOCYTES NFR BLD AUTO: 8.8 % (ref 19.6–45.3)
MAGNESIUM SERPL-MCNC: 1.7 MG/DL (ref 1.6–2.6)
MCH RBC QN AUTO: 30.9 PG (ref 26.6–33)
MCHC RBC AUTO-ENTMCNC: 31.1 G/DL (ref 31.5–35.7)
MCV RBC AUTO: 99.4 FL (ref 79–97)
MONOCYTES # BLD AUTO: 1.14 10*3/MM3 (ref 0.1–0.9)
MONOCYTES NFR BLD AUTO: 10.5 % (ref 5–12)
NEUTROPHILS NFR BLD AUTO: 74.4 % (ref 42.7–76)
NEUTROPHILS NFR BLD AUTO: 8.04 10*3/MM3 (ref 1.7–7)
NRBC BLD AUTO-RTO: 0 /100 WBC (ref 0–0.2)
NT-PROBNP SERPL-MCNC: 1187 PG/ML (ref 0–450)
PLATELET # BLD AUTO: 439 10*3/MM3 (ref 140–450)
PMV BLD AUTO: 9.6 FL (ref 6–12)
POTASSIUM SERPL-SCNC: 3 MMOL/L (ref 3.5–5.2)
PROT SERPL-MCNC: 6.1 G/DL (ref 6–8.5)
RBC # BLD AUTO: 3.14 10*6/MM3 (ref 3.77–5.28)
SODIUM SERPL-SCNC: 140 MMOL/L (ref 136–145)
WBC # BLD AUTO: 10.81 10*3/MM3 (ref 3.4–10.8)

## 2021-10-04 PROCEDURE — 25010000002 EPOETIN ALFA-EPBX 10000 UNIT/ML SOLUTION: Performed by: INTERNAL MEDICINE

## 2021-10-04 PROCEDURE — 25010000002 ONDANSETRON PER 1 MG: Performed by: INTERNAL MEDICINE

## 2021-10-04 PROCEDURE — 80053 COMPREHEN METABOLIC PANEL: CPT | Performed by: NURSE PRACTITIONER

## 2021-10-04 PROCEDURE — 25010000003 POTASSIUM CHLORIDE 10 MEQ/100ML SOLUTION: Performed by: NURSE PRACTITIONER

## 2021-10-04 PROCEDURE — 97530 THERAPEUTIC ACTIVITIES: CPT

## 2021-10-04 PROCEDURE — 85025 COMPLETE CBC W/AUTO DIFF WBC: CPT | Performed by: INTERNAL MEDICINE

## 2021-10-04 PROCEDURE — 83735 ASSAY OF MAGNESIUM: CPT | Performed by: NURSE PRACTITIONER

## 2021-10-04 PROCEDURE — 83880 ASSAY OF NATRIURETIC PEPTIDE: CPT | Performed by: INTERNAL MEDICINE

## 2021-10-04 PROCEDURE — 97535 SELF CARE MNGMENT TRAINING: CPT

## 2021-10-04 RX ORDER — SPIRONOLACTONE 25 MG/1
50 TABLET ORAL EVERY MORNING
Status: DISCONTINUED | OUTPATIENT
Start: 2021-10-05 | End: 2021-10-05 | Stop reason: SDUPTHER

## 2021-10-04 NOTE — PROGRESS NOTES
PROGRESS NOTE.      Patient:  Namita Zabala  YOB: 1975  Date of Service: 10/4/2021  MRN: [id number]   Acct: [id number]   Primary Care Physician: David Lara MD  Advance Directive:   There are no questions and answers to display.     Admit Date: 9/24/2021       Hospital Day: 0  Referring Provider: Tommy Linares MD      Patient Seen, Chart, Consults, Notes, Labs, Radiology studies reviewed.    Chief complaint: Acute kidney injury/dialysis dependent.    Subjective:  Namita Zabala is a 46 y.o. female  whom we were consulted for acute kidney injury.  Patient presented on 8/27 with dyspnea, hypoxia. Diagnosed with COVID-19 pneumonia. Had a CT angiogram which showed right pulmonary embolism. Intubated on 8/27. Renal function has been steadily declining since 9/2.  Hemoglobin dropped to <6 on 9/04 and imaging revealed large retroperitoneal hematoma causing bilateral hydronephrosis and shifting of bladder. Her renal function did not improve with IV fluids. Dr Kaur placed vascath on 9/08 and patient had first dialysis the same day. Extubated on 9/10. She later had permcath placed on 9/16, femoral line removed same day. Moved to medical floor but has returned to CCU for fever, tachycardia, chest pain. Patient has an NG tube. She got stable and was moved back to the medical floor.   On September 24 she was moved to long-term acute care hospital for further care that include rehabilitation and continuation of dialysis.  On September 29, her hemodialysis treatment has been discontinued as she has shown good renal recovery.  On October 1 her permacath was removed.  Today, patient stated that she continues to feel very weak.  She has hypokalemia on her labs and her legs continue to be significantly edematous.      Allergies:  Coconut, Nuts, and Penicillins    Home Meds:  Medications Prior to Admission   Medication Sig Dispense Refill Last Dose   • acetaminophen (TYLENOL) 325 MG  tablet Take 2 tablets by mouth Every 6 (Six) Hours As Needed for Mild Pain , Fever or Headache (fever greater than 101.5 F or headache).      • [] ALPRAZolam (XANAX) 0.25 MG tablet Take 1 tablet by mouth 2 (Two) Times a Day As Needed for Anxiety for up to 5 days.      • bisacodyl (DULCOLAX) 10 MG suppository Insert 1 suppository into the rectum Daily.      • cloNIDine (CATAPRES-TTS) 0.1 MG/24HR patch Place 1 patch on the skin as directed by provider 1 (One) Time Per Week.      • dextrose (D50W) 50 % solution Infuse 50 mL into a venous catheter Every 15 (Fifteen) Minutes As Needed for Low Blood Sugar (Blood Sugar Less Than 70).      • dextrose (GLUTOSE) 40 % gel Take 15 g by mouth Every 15 (Fifteen) Minutes As Needed for Low Blood Sugar (Blood sugar less than 70).      • epoetin mindy-epbx (RETACRIT) 94847 UNIT/ML injection Inject 1 mL under the skin into the appropriate area as directed 3 (Three) Times a Week. Indications: ESRD on Dialysis 6.6 mL     • fluconazole (DIFLUCAN) 200-0.9 MG/100ML-% IVPB Infuse 100 mL into a venous catheter Daily for 10 doses. Indications: Urinary Tract Infection 1000 mL 0    • glucagon, human recombinant, (GLUCAGEN DIAGNOSTIC) 1 MG injection Inject 1 mg under the skin into the appropriate area as directed Every 15 (Fifteen) Minutes As Needed (Blood Glucose Less Than 70) for up to 360 days.      • Heparin Sodium, Porcine, (heparin, porcine,) 1000 UNIT/ML injection 3.6 mL by Intracatheter route As Needed (after dialysis). Indications: Other - full anticoagulation      • insulin regular (humuLIN R,novoLIN R) 100 UNIT/ML injection Inject 0-9 Units under the skin into the appropriate area as directed Every 6 (Six) Hours.  12    • lidocaine (LIDODERM) 5 % Place 1 patch on the skin as directed by provider Daily. Remove & Discard patch within 12 hours or as directed by MD      • lidocaine (LIDODERM) 5 % Place 1 patch on the skin as directed by provider Daily. Remove & Discard patch within  12 hours or as directed by MD      • metoprolol tartrate (LOPRESSOR) 25 MG tablet Take 1 tablet by mouth Every 12 (Twelve) Hours.      • ondansetron (ZOFRAN) 4 MG/2ML injection Infuse 2 mL into a venous catheter Every 6 (Six) Hours As Needed for Nausea or Vomiting.      • pantoprazole (PROTONIX) 40 MG EC tablet Take 1 tablet by mouth Every Morning.      • sennosides-docusate (PERICOLACE) 8.6-50 MG per tablet Take 1 tablet by mouth 2 (Two) Times a Day.          Medicines:  Current Facility-Administered Medications   Medication Dose Route Frequency Provider Last Rate Last Admin   • acetaminophen (TYLENOL) tablet 650 mg  650 mg Oral Q6H PRN Tommy Linares MD        Or   • acetaminophen (TYLENOL) suppository 650 mg  650 mg Rectal Q6H PRN Tommy Linares MD       • acetaminophen (TYLENOL) tablet 1,000 mg  1,000 mg Oral Q4H PRN Tommy Linares MD       • ALPRAZolam (XANAX) tablet 0.25 mg  0.25 mg Oral BID PRN Tommy Linares MD       • ascorbic acid (VITAMIN C) tablet 500 mg  500 mg Oral Daily Tommy Linares MD       • cholecalciferol (VITAMIN D3) tablet 1,000 Units  1,000 Units Oral Daily Tommy Linares MD       • cloNIDine (CATAPRES-TTS) 0.1 MG/24HR patch 1 patch  1 patch Transdermal Weekly Tommy Linares MD       • dextrose (D50W) 25 g/ 50mL Intravenous Solution 25 g  25 g Intravenous Q15 Min PRN Tommy Linares MD       • dextrose (GLUTOSE) oral gel 15 g  15 g Oral Q15 Min PRN Tommy Linares MD       • epoetin mindy-epbx (RETACRIT) injection 10,000 Units  10,000 Units Subcutaneous Once per day on Mon Wed Fri Tommy Linares MD       • fluconazole (DIFLUCAN) tablet 200 mg  200 mg Oral Q24H Tommy Linares MD       • glucagon (human recombinant) (GLUCAGEN DIAGNOSTIC) injection 1 mg  1 mg Subcutaneous Q15 Min PRN Tommy Linares MD       • heparin (porcine) injection 3,600 Units  3,600 Units Intracatheter PRN Tommy Linares MD        • hydrALAZINE (APRESOLINE) injection 10 mg  10 mg Intravenous Q6H PRN Tommy Linares MD       • hydrocortisone-bacitracin-zinc oxide-nystatin (MAGIC BARRIER) ointment 1 application  1 application Topical TID Dea Puentes MD       • labetalol (NORMODYNE,TRANDATE) injection 20 mg  20 mg Intravenous Q6H PRN Tommy Linares MD       • lidocaine (LIDODERM) 5 % 1 patch  1 patch Transdermal Q24H Tommy Linares MD       • lidocaine (LIDODERM) 5 % 1 patch  1 patch Transdermal Q24H Tommy Linares MD       • metoprolol tartrate (LOPRESSOR) tablet 50 mg  50 mg Oral Q12H Elmer Jonhson MD       • naloxone (NARCAN) injection 0.2 mg  0.2 mg Intravenous PRN Tommy Linares MD       • ondansetron (ZOFRAN) injection 4 mg  4 mg Intravenous Q6H PRN Tommy Linares MD       • ondansetron (ZOFRAN) tablet 4 mg  4 mg Oral Q6H PRN Tommy Linares MD       • pantoprazole (PROTONIX) EC tablet 40 mg  40 mg Oral Q AM Tommy Linares MD       • potassium chloride (MICRO-K) CR capsule 40 mEq  40 mEq Oral Daily Elmer Johnson MD       • saccharomyces boulardii (FLORASTOR) capsule 250 mg  250 mg Oral BID Tommy Linares MD       • sennosides-docusate (PERICOLACE) 8.6-50 MG per tablet 1 tablet  1 tablet Oral BID PRN Tommy Linares MD       • sodium chloride 0.9 % flush 10 mL  10 mL Intravenous Q12H Tommy Linares MD       • sodium chloride 0.9 % flush 10 mL  10 mL Intravenous PRN Tommy Linares MD       • sodium chloride injection 40 mEq  10 mL Intravenous Q5 Min PRN Tommy Linares MD       • sodium citrate 4% anticoagulant solution  4 mL Intracatheter PRN Tommy Linares MD       • [START ON 10/5/2021] spironolactone (ALDACTONE) tablet 50 mg  50 mg Oral QAM Elmer Johnson MD       • SUMAtriptan (IMITREX) tablet 50 mg  50 mg Oral Once Tommy Linares MD       • zinc sulfate (ZINCATE) capsule 220 mg   220 mg Oral Daily Tommy Linares MD           Past Medical History:  Past Medical History:   Diagnosis Date   • Angina at rest (CMS/HCC)    • Migraine     Sees Pain Management for injections.    • MVP (mitral valve prolapse)    • Syncope        Past Surgical History:  Past Surgical History:   Procedure Laterality Date   • BREAST BIOPSY Right 2011    benign   • INSERTION HEMODIALYSIS CATHETER Left 9/16/2021    Procedure: HEMODIALYSIS CATHETER INSERTION;  Surgeon: Anders Kaur MD;  Location: [place] [address];  Service: Vascular;  Laterality: Left;   • TONSILLECTOMY         Family History  Family History   Problem Relation Age of Onset   • Colon cancer Maternal Aunt 52   • Fibromyalgia Mother    • Heart disease Mother    • Hypertension Mother    • Hodgkin's lymphoma Paternal Grandmother    • Ovarian cancer Neg Hx    • Breast cancer Neg Hx        Social History  Social History     Socioeconomic History   • Marital status:      Spouse name: Not on file   • Number of children: Not on file   • Years of education: Not on file   • Highest education level: Not on file   Tobacco Use   • Smoking status: Never Smoker   • Smokeless tobacco: Never Used   Substance and Sexual Activity   • Alcohol use: No   • Drug use: No       Review of Systems:  History obtained from chart review and the patient  General ROS: No fever or chills  Respiratory ROS: No cough, +shortness of breath, -wheezing  Cardiovascular ROS: no chest pain but dyspnea on exertion  Gastrointestinal ROS: No abdominal pain or melena  Genito-Urinary ROS: No dysuria or hematuria  Musculoskeletal: negative  Skin: negative    Objective:  Blood pressure: 123/ 92 mmHg  Heart rate: 103/ min.  O2 saturation 96%.    Physical examination:  General: awake/alert    Head: Atraumatic, normocephalic  Neck: No masses, no jugular venous distention  Chest:  clear to auscultation bilaterally without respiratory distress  CVS: regular rate and  rhythm  Abdominal: soft, nontender, normal bowel sounds  Extremities: 2+ pedal edema  Skin: warm and dry without rash  Neuro: No focal motor deficits      Labs:  Lab Results (last 24 hours)     Procedure Component Value Units Date/Time    Comprehensive Metabolic Panel [[id number]]  (Abnormal) Collected: 10/04/21 0445    Specimen: Blood Updated: 10/04/21 0632     Glucose 101 mg/dL      BUN 13 mg/dL      Creatinine 0.90 mg/dL      Sodium 140 mmol/L      Potassium 3.0 mmol/L      Chloride 105 mmol/L      CO2 22.0 mmol/L      Calcium 9.7 mg/dL      Total Protein 6.1 g/dL      Albumin 3.00 g/dL      ALT (SGPT) 20 U/L      AST (SGOT) 23 U/L      Alkaline Phosphatase 91 U/L      Total Bilirubin 0.6 mg/dL      eGFR Non African Amer 68 mL/min/1.73      Globulin 3.1 gm/dL      A/G Ratio 1.0 g/dL      BUN/Creatinine Ratio 14.4     Anion Gap 13.0 mmol/L     Narrative:      GFR Normal >60  Chronic Kidney Disease <60  Kidney Failure <15      Magnesium [[id number]]  (Normal) Collected: 10/04/21 0445    Specimen: Blood Updated: 10/04/21 0632     Magnesium 1.7 mg/dL     BNP [[id number]]  (Abnormal) Collected: 10/04/21 0445    Specimen: Blood Updated: 10/04/21 0632     proBNP 1,187.0 pg/mL     Narrative:      Among patients with dyspnea, NT-proBNP is highly sensitive for the detection of acute congestive heart failure. In addition NT-proBNP of <300 pg/ml effectively rules out acute congestive heart failure with 99% negative predictive value.    Results may be falsely decreased if patient taking Biotin.      CBC & Differential [[id number]]  (Abnormal) Collected: 10/04/21 0445    Specimen: Blood Updated: 10/04/21 0604    Narrative:      The following orders were created for panel order CBC & Differential.  Procedure                               Abnormality         Status                     ---------                               -----------         ------                     CBC Auto Differential[[id number]]        Abnormal             Final result                 Please view results for these tests on the individual orders.    CBC Auto Differential [[id number]]  (Abnormal) Collected: 10/04/21 0445    Specimen: Blood Updated: 10/04/21 0604     WBC 10.81 10*3/mm3      RBC 3.14 10*6/mm3      Hemoglobin 9.7 g/dL      Hematocrit 31.2 %      MCV 99.4 fL      MCH 30.9 pg      MCHC 31.1 g/dL      RDW 15.7 %      RDW-SD 56.3 fl      MPV 9.6 fL      Platelets 439 10*3/mm3      Neutrophil % 74.4 %      Lymphocyte % 8.8 %      Monocyte % 10.5 %      Eosinophil % 3.0 %      Basophil % 0.9 %      Immature Grans % 2.4 %      Neutrophils, Absolute 8.04 10*3/mm3      Lymphocytes, Absolute 0.95 10*3/mm3      Monocytes, Absolute 1.14 10*3/mm3      Eosinophils, Absolute 0.32 10*3/mm3      Basophils, Absolute 0.10 10*3/mm3      Immature Grans, Absolute 0.26 10*3/mm3      nRBC 0.0 /100 WBC           Radiology:   Imaging Results (Last 24 Hours)     Procedure Component Value Units Date/Time    CT Abdomen Pelvis With Contrast [[id number]] Collected: 10/03/21 2037     Updated: 10/03/21 2049    Narrative:      EXAMINATION:   CT ABDOMEN PELVIS W CONTRAST-  10/3/2021 8:37 PM CDT     HISTORY: CT ABDOMEN AND PELVIS WITH CONTRAST 10/3/2021 8:21 PM CDT     HISTORY: Abdominal distention     COMPARISON: September 20, 2021.      DLP: 511 mGy cm     TECHNIQUE: Following the intravenous administration of contrast, helical  CT tomographic images of the abdomen and pelvis were acquired. Coronal  reformatted images were also provided for review.      FINDINGS:   Cavities present in the right lower lobe. Groundglass infiltrates are  present. Chronic change in the pulmonary parenchyma is present..      LIVER: No focal liver lesion. The hepatic vasculature is patent.      BILIARY SYSTEM: The gallbladder is contracted..      PANCREAS: No focal pancreatic lesion.      SPLEEN: Unremarkable.      KIDNEYS AND ADRENALS: Bilateral kidneys and adrenal glands are  unremarkable. Bilateral  hydronephrosis is present. The right and left  ureter are obstructed. The.     RETROPERITONEUM: A large left pelvic mass is present. This is been  described as a hematoma. This mass is 9 x 9.5 x 15 cm. This is a large  pelvic hematoma displacing the bladder from midline to the right and  causing bilateral obstructive uropathy. This also may be causing  thrombosis of the left common femoral vein..      GI TRACT: There is marked distention of the colon. It cannot be  determined if the pelvic mass is causing an obstruction at the level of  the sigmoid colon or if this is an ileus.. .     OTHER: . Abdominal aorta is unremarkable.. The osseous structures and  soft tissues demonstrate no worrisome lesions.     PELVIS: Large pelvic mass is present as described above. The bladder is  displaced from midline to the right..       Impression:      1. Large pelvic mass. This is a pelvic hematoma 9 x 9.4 x 15 cm. This  displaces the bladder from midline to the right. There is associated  bilateral hydronephrosis due to the mass effect on the bladder. There  also appears to be deep venous thrombosis in the left common femoral  vein. There are also appears to be a bowel obstruction at the level of  the sigmoid colon due to the mass effect from this large pelvic  hematoma..         This report was finalized on 10/03/2021 20:46 by Dr. Nadeem Marshall MD.              Assessment   1.  Acute kidney injury/stage III/improved/off dialysis.  2.  Obstructive uropathy/improved.  3.  Left pelvic hematoma compressing bladder.  4.  Acute hypoxemic respiratory failure now resolved.  5.  Bilateral COVID-19 pneumonia improved.  6.  Pulmonary embolism   7.  Metabolic acidosis--improved  9.  IV contrast dye exposure  10.  Hypokalemia.    Plan:  1.  Increase Aldactone.  2.  Avoding Bumex.  3.  Potassium supplement as needed  4.  Continue Lopressor.        Elmer Johnson MD  10/4/2021  15:38 CDT

## 2021-10-04 NOTE — PROGRESS NOTES
TED Moe APRN        Internal Medicine Progress Note    10/4/2021   10:27 CDT    Name:  Namita Zabala  MRN:    [id number]     Acct:     [id number]   Room:  [address] [place] [address] Day: 0     Admit Date: 9/24/2021  4:31 PM  PCP: David Lara MD    Subjective:     C/C: weakness    Interval History: Status:  stayed the same.  Resting in bed. No family at bedside.  Afebrile.  Remains on room air and tolerating without difficulty.  Continues to have generalized weakness.  Still with some increased nausea. Counts stable. Slow progress with therapies. CT abdomen and pelvis reviewed - known pelvic hematoma. Continues to refuse potassium supplements at times.  Asking for discharge to home.       Review of Systems   Constitutional: Positive for malaise/fatigue. Negative for chills, decreased appetite, weight gain and weight loss.   HENT: Negative for congestion, ear discharge, hoarse voice and tinnitus.    Eyes: Negative for blurred vision, discharge, visual disturbance and visual halos.   Cardiovascular: Negative for chest pain, claudication, dyspnea on exertion, irregular heartbeat, leg swelling, orthopnea and paroxysmal nocturnal dyspnea.   Respiratory: Negative for cough, shortness of breath, sputum production and wheezing.    Endocrine: Negative for cold intolerance, heat intolerance and polyuria.   Hematologic/Lymphatic: Negative for adenopathy. Does not bruise/bleed easily.   Skin: Negative for dry skin, itching and suspicious lesions.   Musculoskeletal: Negative for arthritis, back pain, falls, joint pain, muscle weakness and myalgias.   Gastrointestinal: Positive for nausea. Negative for abdominal pain, constipation, diarrhea, dysphagia and hematemesis.   Genitourinary: Negative for bladder incontinence, dysuria and frequency.   Neurological: Positive for weakness. Negative for aphonia, disturbances in coordination and dizziness.   Psychiatric/Behavioral: Negative  for altered mental status, depression, memory loss and substance abuse. The patient does not have insomnia and is not nervous/anxious.          Medications:     Allergies:   Allergies   Allergen Reactions   • Coconut Unknown - High Severity   • Nuts Unknown - High Severity   • Penicillins        Current Meds:   Current Facility-Administered Medications:   •  acetaminophen (TYLENOL) tablet 650 mg, 650 mg, Oral, Q6H PRN **OR** acetaminophen (TYLENOL) suppository 650 mg, 650 mg, Rectal, Q6H PRN, Tommy Linares MD  •  acetaminophen (TYLENOL) tablet 1,000 mg, 1,000 mg, Oral, Q4H PRN, Tommy Linares MD  •  ALPRAZolam (XANAX) tablet 0.25 mg, 0.25 mg, Oral, BID PRN, Tommy Linares MD  •  ascorbic acid (VITAMIN C) tablet 500 mg, 500 mg, Oral, Daily, Tommy Linares MD  •  cholecalciferol (VITAMIN D3) tablet 1,000 Units, 1,000 Units, Oral, Daily, Tommy Linares MD  •  cloNIDine (CATAPRES-TTS) 0.1 MG/24HR patch 1 patch, 1 patch, Transdermal, Weekly, Tommy Linares MD  •  dextrose (D50W) 25 g/ 50mL Intravenous Solution 25 g, 25 g, Intravenous, Q15 Min PRN, Tommy Linares MD  •  dextrose (GLUTOSE) oral gel 15 g, 15 g, Oral, Q15 Min PRN, Tommy Linares MD  •  epoetin mindy-epbx (RETACRIT) injection 10,000 Units, 10,000 Units, Subcutaneous, Once per day on Mon Wed Fri, Tommy Linares MD  •  fluconazole (DIFLUCAN) tablet 200 mg, 200 mg, Oral, Q24H, Tommy Linares MD  •  glucagon (human recombinant) (GLUCAGEN DIAGNOSTIC) injection 1 mg, 1 mg, Subcutaneous, Q15 Min PRN, Tommy Linares MD  •  heparin (porcine) injection 3,600 Units, 3,600 Units, Intracatheter, PRN, Tommy Linares MD  •  hydrALAZINE (APRESOLINE) injection 10 mg, 10 mg, Intravenous, Q6H PRN, Tommy Linares MD  •  hydrocortisone-bacitracin-zinc oxide-nystatin (MAGIC BARRIER) ointment 1 application, 1 application, Topical, TID, Dea Puentes MD  •  labetalol  (NORMODYNE,TRANDATE) injection 20 mg, 20 mg, Intravenous, Q6H PRN, Tommy Linares MD  •  lidocaine (LIDODERM) 5 % 1 patch, 1 patch, Transdermal, Q24H, Tommy Linares MD  •  lidocaine (LIDODERM) 5 % 1 patch, 1 patch, Transdermal, Q24H, Tommy Linares MD  •  metoprolol tartrate (LOPRESSOR) tablet 50 mg, 50 mg, Oral, Q12H, Elmer Johnson MD  •  naloxone (NARCAN) injection 0.2 mg, 0.2 mg, Intravenous, PRN, Tommy Linares MD  •  ondansetron (ZOFRAN) injection 4 mg, 4 mg, Intravenous, Q6H PRN, Tommy Linares MD  •  ondansetron (ZOFRAN) tablet 4 mg, 4 mg, Oral, Q6H PRN, Tommy Linares MD  •  pantoprazole (PROTONIX) EC tablet 40 mg, 40 mg, Oral, Q AM, Tommy Linares MD  •  potassium chloride (MICRO-K) CR capsule 40 mEq, 40 mEq, Oral, Daily, Elmer Johnson MD  •  saccharomyces boulardii (FLORASTOR) capsule 250 mg, 250 mg, Oral, BID, Tommy Linares MD  •  sennosides-docusate (PERICOLACE) 8.6-50 MG per tablet 1 tablet, 1 tablet, Oral, BID PRN, Tommy Linares MD  •  sodium chloride 0.9 % flush 10 mL, 10 mL, Intravenous, Q12H, Tommy Linares MD  •  sodium chloride 0.9 % flush 10 mL, 10 mL, Intravenous, PRN, Tommy Linares MD  •  sodium chloride injection 40 mEq, 10 mL, Intravenous, Q5 Min PRN, Tommy Linares MD  •  sodium citrate 4% anticoagulant solution, 4 mL, Intracatheter, PRN, Tommy Linares MD  •  spironolactone (ALDACTONE) tablet 25 mg, 25 mg, Oral, QAM, SensConchita siegel MD  •  SUMAtriptan (IMITREX) tablet 50 mg, 50 mg, Oral, Once, Tommy Linares MD  •  zinc sulfate (ZINCATE) capsule 220 mg, 220 mg, Oral, Daily, Tommy Linares MD    Data:     Code Status:    There are no questions and answers to display.       Family History   Problem Relation Age of Onset   • Colon cancer Maternal Aunt 52   • Fibromyalgia Mother    • Heart disease Mother    • Hypertension Mother    • Hodgkin's  "lymphoma Paternal Grandmother    • Ovarian cancer Neg Hx    • Breast cancer Neg Hx        Social History     Socioeconomic History   • Marital status:      Spouse name: Not on file   • Number of children: Not on file   • Years of education: Not on file   • Highest education level: Not on file   Tobacco Use   • Smoking status: Never Smoker   • Smokeless tobacco: Never Used   Substance and Sexual Activity   • Alcohol use: No   • Drug use: No       Vitals:  Ht 170.2 cm (67\")   Wt 95 kg (209 lb 8 oz)   LMP  (LMP Unknown)   BMI 32.81 kg/m²   T 98.6 P 104 R 26 /87 Sp02 96% (room air)    I/O (24Hr):  No intake or output data in the 24 hours ending 10/04/21 1027    Labs and imaging:      Recent Results (from the past 12 hour(s))   Magnesium    Collection Time: 10/04/21  4:45 AM    Specimen: Blood   Result Value Ref Range    Magnesium 1.7 1.6 - 2.6 mg/dL   Comprehensive Metabolic Panel    Collection Time: 10/04/21  4:45 AM    Specimen: Blood   Result Value Ref Range    Glucose 101 (H) 65 - 99 mg/dL    BUN 13 6 - 20 mg/dL    Creatinine 0.90 0.57 - 1.00 mg/dL    Sodium 140 136 - 145 mmol/L    Potassium 3.0 (L) 3.5 - 5.2 mmol/L    Chloride 105 98 - 107 mmol/L    CO2 22.0 22.0 - 29.0 mmol/L    Calcium 9.7 8.6 - 10.5 mg/dL    Total Protein 6.1 6.0 - 8.5 g/dL    Albumin 3.00 (L) 3.50 - 5.20 g/dL    ALT (SGPT) 20 1 - 33 U/L    AST (SGOT) 23 1 - 32 U/L    Alkaline Phosphatase 91 39 - 117 U/L    Total Bilirubin 0.6 0.0 - 1.2 mg/dL    eGFR Non African Amer 68 >60 mL/min/1.73    Globulin 3.1 gm/dL    A/G Ratio 1.0 g/dL    BUN/Creatinine Ratio 14.4 7.0 - 25.0    Anion Gap 13.0 5.0 - 15.0 mmol/L   BNP    Collection Time: 10/04/21  4:45 AM    Specimen: Blood   Result Value Ref Range    proBNP 1,187.0 (H) 0.0 - 450.0 pg/mL   CBC Auto Differential    Collection Time: 10/04/21  4:45 AM    Specimen: Blood   Result Value Ref Range    WBC 10.81 (H) 3.40 - 10.80 10*3/mm3    RBC 3.14 (L) 3.77 - 5.28 10*6/mm3    Hemoglobin 9.7 (L) " 12.0 - 15.9 g/dL    Hematocrit 31.2 (L) 34.0 - 46.6 %    MCV 99.4 (H) 79.0 - 97.0 fL    MCH 30.9 26.6 - 33.0 pg    MCHC 31.1 (L) 31.5 - 35.7 g/dL    RDW 15.7 (H) 12.3 - 15.4 %    RDW-SD 56.3 (H) 37.0 - 54.0 fl    MPV 9.6 6.0 - 12.0 fL    Platelets 439 140 - 450 10*3/mm3    Neutrophil % 74.4 42.7 - 76.0 %    Lymphocyte % 8.8 (L) 19.6 - 45.3 %    Monocyte % 10.5 5.0 - 12.0 %    Eosinophil % 3.0 0.3 - 6.2 %    Basophil % 0.9 0.0 - 1.5 %    Immature Grans % 2.4 (H) 0.0 - 0.5 %    Neutrophils, Absolute 8.04 (H) 1.70 - 7.00 10*3/mm3    Lymphocytes, Absolute 0.95 0.70 - 3.10 10*3/mm3    Monocytes, Absolute 1.14 (H) 0.10 - 0.90 10*3/mm3    Eosinophils, Absolute 0.32 0.00 - 0.40 10*3/mm3    Basophils, Absolute 0.10 0.00 - 0.20 10*3/mm3    Immature Grans, Absolute 0.26 (H) 0.00 - 0.05 10*3/mm3    nRBC 0.0 0.0 - 0.2 /100 WBC                 Physical Examination:        Physical Exam  Vitals and nursing note reviewed.   Constitutional:       Appearance: Normal appearance.   HENT:      Head: Normocephalic.      Right Ear: External ear normal.      Left Ear: External ear normal.      Nose: Nose normal.      Mouth/Throat:      Mouth: Mucous membranes are moist.      Pharynx: Oropharynx is clear.   Eyes:      Extraocular Movements: Extraocular movements intact.      Conjunctiva/sclera: Conjunctivae normal.      Pupils: Pupils are equal, round, and reactive to light.   Cardiovascular:      Rate and Rhythm: Normal rate and regular rhythm.      Heart sounds: Murmur heard.     Pulmonary:      Effort: Pulmonary effort is normal.      Breath sounds: Normal breath sounds.   Abdominal:      General: Bowel sounds are normal. There is distension.      Palpations: Abdomen is soft.   Musculoskeletal:      Cervical back: Normal range of motion and neck supple.      Comments: Generalized weakness   Skin:     General: Skin is warm and dry.   Neurological:      Mental Status: She is alert and oriented to person, place, and time.   Psychiatric:          Mood and Affect: Mood normal.         Behavior: Behavior normal.           Assessment:            * No active hospital problems. *    Past Medical History:   Diagnosis Date   • Angina at rest (CMS/HCC)    • Migraine     Sees Pain Management for injections.    • MVP (mitral valve prolapse)    • Syncope         Plan:        1. Acute RLL pulmonary embolism  2. S/p COVID 19  3. Acute renal failure  4. Rectus sheath and pelvic hematoma  5. Multifactorial anemia  6. MVP  7. HTN  8. Hypokalemia    Continue current treatment. Monitor counts. Increase activity as tolerated. Aggressive therapies.  Monitor renal function. Labs Thursday. Continue potassium replacement. Encourage participation with therapies. Continue off anticoagulation due to hematomas. Consult general surgery. Encourage compliance with treatment.     Electronically signed by BERT Jamison on 10/4/2021 at 10:27 CDT

## 2021-10-05 LAB
ANION GAP SERPL CALCULATED.3IONS-SCNC: 12 MMOL/L (ref 5–15)
BUN SERPL-MCNC: 10 MG/DL (ref 6–20)
BUN/CREAT SERPL: 13 (ref 7–25)
CALCIUM SPEC-SCNC: 9.2 MG/DL (ref 8.6–10.5)
CHLORIDE SERPL-SCNC: 104 MMOL/L (ref 98–107)
CO2 SERPL-SCNC: 22 MMOL/L (ref 22–29)
CREAT SERPL-MCNC: 0.77 MG/DL (ref 0.57–1)
GFR SERPL CREATININE-BSD FRML MDRD: 81 ML/MIN/1.73
GLUCOSE SERPL-MCNC: 99 MG/DL (ref 65–99)
MAGNESIUM SERPL-MCNC: 1.7 MG/DL (ref 1.6–2.6)
POTASSIUM SERPL-SCNC: 2.6 MMOL/L (ref 3.5–5.2)
POTASSIUM SERPL-SCNC: 2.9 MMOL/L (ref 3.5–5.2)
SODIUM SERPL-SCNC: 138 MMOL/L (ref 136–145)

## 2021-10-05 PROCEDURE — 83735 ASSAY OF MAGNESIUM: CPT | Performed by: INTERNAL MEDICINE

## 2021-10-05 PROCEDURE — 25010000003 POTASSIUM CHLORIDE 10 MEQ/100ML SOLUTION: Performed by: INTERNAL MEDICINE

## 2021-10-05 PROCEDURE — 97535 SELF CARE MNGMENT TRAINING: CPT | Performed by: OCCUPATIONAL THERAPIST

## 2021-10-05 PROCEDURE — 80048 BASIC METABOLIC PNL TOTAL CA: CPT | Performed by: INTERNAL MEDICINE

## 2021-10-05 PROCEDURE — 97530 THERAPEUTIC ACTIVITIES: CPT

## 2021-10-05 PROCEDURE — 84132 ASSAY OF SERUM POTASSIUM: CPT | Performed by: INTERNAL MEDICINE

## 2021-10-05 RX ORDER — SPIRONOLACTONE 25 MG/1
50 TABLET ORAL
Status: DISCONTINUED | OUTPATIENT
Start: 2021-10-05 | End: 2021-10-12 | Stop reason: HOSPADM

## 2021-10-05 RX ORDER — MIDODRINE HYDROCHLORIDE 5 MG/1
2.5 TABLET ORAL
Status: DISCONTINUED | OUTPATIENT
Start: 2021-10-05 | End: 2021-10-12 | Stop reason: HOSPADM

## 2021-10-05 RX ORDER — METOPROLOL TARTRATE 50 MG/1
50 TABLET, FILM COATED ORAL EVERY 12 HOURS SCHEDULED
Status: DISCONTINUED | OUTPATIENT
Start: 2021-10-05 | End: 2021-10-12 | Stop reason: HOSPADM

## 2021-10-05 RX ORDER — POTASSIUM CHLORIDE 7.45 MG/ML
10 INJECTION INTRAVENOUS
Status: DISPENSED | OUTPATIENT
Start: 2021-10-05 | End: 2021-10-06

## 2021-10-05 RX ORDER — POTASSIUM CHLORIDE 7.45 MG/ML
10 INJECTION INTRAVENOUS
Status: DISPENSED | OUTPATIENT
Start: 2021-10-05 | End: 2021-10-05

## 2021-10-05 NOTE — PROGRESS NOTES
PROGRESS NOTE.      Patient:  Namita Zabala  YOB: 1975  Date of Service: 10/5/2021  MRN: [id number]   Acct: [id number]   Primary Care Physician: David Lara MD  Advance Directive:   There are no questions and answers to display.     Admit Date: 9/24/2021       Hospital Day: 0  Referring Provider: Tommy Linares MD      Patient Seen, Chart, Consults, Notes, Labs, Radiology studies reviewed.    Chief complaint: Acute kidney injury/dialysis dependent.    Subjective:  Namita Zabala is a 46 y.o. female  whom we were consulted for acute kidney injury.  Patient presented on 8/27 with dyspnea, hypoxia. Diagnosed with COVID-19 pneumonia. Had a CT angiogram which showed right pulmonary embolism. Intubated on 8/27. Renal function has been steadily declining since 9/2.  Hemoglobin dropped to <6 on 9/04 and imaging revealed large retroperitoneal hematoma causing bilateral hydronephrosis and shifting of bladder. Her renal function did not improve with IV fluids. Dr Kaur placed vascath on 9/08 and patient had first dialysis the same day. Extubated on 9/10. She later had permcath placed on 9/16, femoral line removed same day. Moved to medical floor but has returned to CCU for fever, tachycardia, chest pain. Patient has an NG tube. She got stable and was moved back to the medical floor.   On September 24 she was moved to long-term acute care hospital for further care that include rehabilitation and continuation of dialysis.  On September 29, her hemodialysis treatment has been discontinued as she has shown good renal recovery.  On October 1 her permacath was removed.  Today, patient stated that she continues to feel very weak.  She has hypokalemia on her labs and her legs continue to be significantly edematous. She is also orthostatic and has not been able to do rehab.       Allergies:  Coconut, Nuts, and Penicillins    Home Meds:  Medications Prior to Admission   Medication Sig  Dispense Refill Last Dose   • acetaminophen (TYLENOL) 325 MG tablet Take 2 tablets by mouth Every 6 (Six) Hours As Needed for Mild Pain , Fever or Headache (fever greater than 101.5 F or headache).      • [] ALPRAZolam (XANAX) 0.25 MG tablet Take 1 tablet by mouth 2 (Two) Times a Day As Needed for Anxiety for up to 5 days.      • bisacodyl (DULCOLAX) 10 MG suppository Insert 1 suppository into the rectum Daily.      • cloNIDine (CATAPRES-TTS) 0.1 MG/24HR patch Place 1 patch on the skin as directed by provider 1 (One) Time Per Week.      • dextrose (D50W) 50 % solution Infuse 50 mL into a venous catheter Every 15 (Fifteen) Minutes As Needed for Low Blood Sugar (Blood Sugar Less Than 70).      • dextrose (GLUTOSE) 40 % gel Take 15 g by mouth Every 15 (Fifteen) Minutes As Needed for Low Blood Sugar (Blood sugar less than 70).      • epoetin mindy-epbx (RETACRIT) 31446 UNIT/ML injection Inject 1 mL under the skin into the appropriate area as directed 3 (Three) Times a Week. Indications: ESRD on Dialysis 6.6 mL     • fluconazole (DIFLUCAN) 200-0.9 MG/100ML-% IVPB Infuse 100 mL into a venous catheter Daily for 10 doses. Indications: Urinary Tract Infection 1000 mL 0    • glucagon, human recombinant, (GLUCAGEN DIAGNOSTIC) 1 MG injection Inject 1 mg under the skin into the appropriate area as directed Every 15 (Fifteen) Minutes As Needed (Blood Glucose Less Than 70) for up to 360 days.      • Heparin Sodium, Porcine, (heparin, porcine,) 1000 UNIT/ML injection 3.6 mL by Intracatheter route As Needed (after dialysis). Indications: Other - full anticoagulation      • insulin regular (humuLIN R,novoLIN R) 100 UNIT/ML injection Inject 0-9 Units under the skin into the appropriate area as directed Every 6 (Six) Hours.  12    • lidocaine (LIDODERM) 5 % Place 1 patch on the skin as directed by provider Daily. Remove & Discard patch within 12 hours or as directed by MD      • lidocaine (LIDODERM) 5 % Place 1 patch on the skin  as directed by provider Daily. Remove & Discard patch within 12 hours or as directed by MD      • metoprolol tartrate (LOPRESSOR) 25 MG tablet Take 1 tablet by mouth Every 12 (Twelve) Hours.      • ondansetron (ZOFRAN) 4 MG/2ML injection Infuse 2 mL into a venous catheter Every 6 (Six) Hours As Needed for Nausea or Vomiting.      • pantoprazole (PROTONIX) 40 MG EC tablet Take 1 tablet by mouth Every Morning.      • sennosides-docusate (PERICOLACE) 8.6-50 MG per tablet Take 1 tablet by mouth 2 (Two) Times a Day.          Medicines:  Current Facility-Administered Medications   Medication Dose Route Frequency Provider Last Rate Last Admin   • acetaminophen (TYLENOL) tablet 650 mg  650 mg Oral Q6H PRN Tommy Linares MD        Or   • acetaminophen (TYLENOL) suppository 650 mg  650 mg Rectal Q6H PRN Tommy Linares MD       • acetaminophen (TYLENOL) tablet 1,000 mg  1,000 mg Oral Q4H PRN Tommy Linares MD       • ALPRAZolam (XANAX) tablet 0.25 mg  0.25 mg Oral BID PRN Tommy Linares MD       • ascorbic acid (VITAMIN C) tablet 500 mg  500 mg Oral Daily Tommy Linares MD       • cholecalciferol (VITAMIN D3) tablet 1,000 Units  1,000 Units Oral Daily Tommy Linares MD       • cloNIDine (CATAPRES-TTS) 0.1 MG/24HR patch 1 patch  1 patch Transdermal Weekly Tommy Linares MD       • dextrose (D50W) 25 g/ 50mL Intravenous Solution 25 g  25 g Intravenous Q15 Min PRN Tommy Linares MD       • dextrose (GLUTOSE) oral gel 15 g  15 g Oral Q15 Min PRN Tommy Linares MD       • epoetin mindy-epbx (RETACRIT) injection 10,000 Units  10,000 Units Subcutaneous Once per day on Mon Wed Fri Tommy Linares MD       • glucagon (human recombinant) (GLUCAGEN DIAGNOSTIC) injection 1 mg  1 mg Subcutaneous Q15 Min PRN Tommy Linares MD       • heparin (porcine) injection 3,600 Units  3,600 Units Intracatheter PRN Tommy Linares MD       • hydrALAZINE  (APRESOLINE) injection 10 mg  10 mg Intravenous Q6H PRN Tommy Linares MD       • hydrocortisone-bacitracin-zinc oxide-nystatin (MAGIC BARRIER) ointment 1 application  1 application Topical TID Dea Puentes MD       • labetalol (NORMODYNE,TRANDATE) injection 20 mg  20 mg Intravenous Q6H PRN Tommy Linares MD       • lidocaine (LIDODERM) 5 % 1 patch  1 patch Transdermal Q24H Tommy Linares MD       • lidocaine (LIDODERM) 5 % 1 patch  1 patch Transdermal Q24H Tommy Linares MD       • metoprolol tartrate (LOPRESSOR) tablet 50 mg  50 mg Oral Q12H Elmer Johnson MD       • naloxone (NARCAN) injection 0.2 mg  0.2 mg Intravenous PRN Tommy Linares MD       • ondansetron (ZOFRAN) injection 4 mg  4 mg Intravenous Q6H PRN Tommy Linares MD       • ondansetron (ZOFRAN) tablet 4 mg  4 mg Oral Q6H PRN Tommy Linares MD       • pantoprazole (PROTONIX) EC tablet 40 mg  40 mg Oral Q AM Tommy Linares MD       • potassium chloride (MICRO-K) CR capsule 40 mEq  40 mEq Oral Daily Elmer Johnson MD       • potassium chloride 10 mEq in 100 mL IVPB  10 mEq Intravenous Q1H Tommy Linares MD       • saccharomyces boulardii (FLORASTOR) capsule 250 mg  250 mg Oral BID Tommy Linares MD       • sennosides-docusate (PERICOLACE) 8.6-50 MG per tablet 1 tablet  1 tablet Oral BID PRN Tommy Linares MD       • sodium chloride 0.9 % flush 10 mL  10 mL Intravenous Q12H Tommy Linares MD       • sodium chloride 0.9 % flush 10 mL  10 mL Intravenous PRN Tommy Linares MD       • sodium chloride injection 40 mEq  10 mL Intravenous Q5 Min PRN Tommy Linares MD       • sodium citrate 4% anticoagulant solution  4 mL Intracatheter PRN Tommy Linares MD       • spironolactone (ALDACTONE) tablet 50 mg  50 mg Oral QAM Elmer Johnson MD       • SUMAtriptan (IMITREX) tablet 50 mg  50 mg Oral Once Vijay  Tommy Dyer MD       • zinc sulfate (ZINCATE) capsule 220 mg  220 mg Oral Daily Tommy Linares MD           Past Medical History:  Past Medical History:   Diagnosis Date   • Angina at rest (CMS/HCC)    • Migraine     Sees Pain Management for injections.    • MVP (mitral valve prolapse)    • Syncope        Past Surgical History:  Past Surgical History:   Procedure Laterality Date   • BREAST BIOPSY Right 2011    benign   • INSERTION HEMODIALYSIS CATHETER Left 9/16/2021    Procedure: HEMODIALYSIS CATHETER INSERTION;  Surgeon: Anders Kaur MD;  Location: [place] [address];  Service: Vascular;  Laterality: Left;   • TONSILLECTOMY         Family History  Family History   Problem Relation Age of Onset   • Colon cancer Maternal Aunt 52   • Fibromyalgia Mother    • Heart disease Mother    • Hypertension Mother    • Hodgkin's lymphoma Paternal Grandmother    • Ovarian cancer Neg Hx    • Breast cancer Neg Hx        Social History  Social History     Socioeconomic History   • Marital status:      Spouse name: Not on file   • Number of children: Not on file   • Years of education: Not on file   • Highest education level: Not on file   Tobacco Use   • Smoking status: Never Smoker   • Smokeless tobacco: Never Used   Substance and Sexual Activity   • Alcohol use: No   • Drug use: No       Review of Systems:  History obtained from chart review and the patient  General ROS: No fever or chills  Respiratory ROS: No cough, +shortness of breath, -wheezing  Cardiovascular ROS: no chest pain but dyspnea on exertion  Gastrointestinal ROS: No abdominal pain or melena  Genito-Urinary ROS: No dysuria or hematuria  Musculoskeletal: negative  Skin: negative    Objective:  Blood pressure: 133/ 91 mmHg  Heart rate: 98/ min.  O2 saturation 96%.    Physical examination:  General: awake/alert    Head: Atraumatic, normocephalic  Neck: No masses, no jugular venous distention  Chest:  clear to auscultation bilaterally  without respiratory distress  CVS: regular rate and rhythm  Abdominal: soft, nontender, normal bowel sounds  Extremities: 2+ pedal edema  Skin: warm and dry without rash  Neuro: No focal motor deficits      Labs:  Lab Results (last 24 hours)     Procedure Component Value Units Date/Time    Basic Metabolic Panel [[id number]]  (Abnormal) Collected: 10/05/21 0416    Specimen: Blood Updated: 10/05/21 0546     Glucose 99 mg/dL      BUN 10 mg/dL      Creatinine 0.77 mg/dL      Sodium 138 mmol/L      Potassium 2.6 mmol/L      Chloride 104 mmol/L      CO2 22.0 mmol/L      Calcium 9.2 mg/dL      eGFR Non African Amer 81 mL/min/1.73      BUN/Creatinine Ratio 13.0     Anion Gap 12.0 mmol/L     Narrative:      GFR Normal >60  Chronic Kidney Disease <60  Kidney Failure <15      Magnesium [[id number]]  (Normal) Collected: 10/05/21 0416    Specimen: Blood Updated: 10/05/21 0540     Magnesium 1.7 mg/dL           Radiology:   Imaging Results (Last 24 Hours)     ** No results found for the last 24 hours. **              Assessment   1.  Acute kidney injury/stage III/improved/off dialysis.  2.  Obstructive uropathy/improved.  3.  Left pelvic hematoma compressing bladder.  4.  Acute hypoxemic respiratory failure now resolved.  5.  Bilateral COVID-19 pneumonia improved.  6.  Pulmonary embolism   7.  Metabolic acidosis--improved  9.  IV contrast dye exposure  10.  Hypokalemia.    Plan:  1.  Increase Aldactone to 50 mg po bid.  2.  Avoding Bumex.  3.  Potassium supplementation  4.  Hold Lopressor for low Bps and will give Midodrine prior to getting up/ rehab.         Elmer Johnson MD  10/5/2021  11:28 CDT

## 2021-10-05 NOTE — PROGRESS NOTES
"[facility] CenterJocy Linares M.D.  BERT Wu        Internal Medicine Progress Note    10/5/2021   14:52 CDT    Name:  Namita Zabala  MRN:    [id number]     Acct:     [id number]   Room:  [address] [place] [address] Day: 0     Admit Date: 9/24/2021  4:31 PM  PCP: David Lara MD    Subjective:     C/C: weakness    Interval History: Status:  stayed the same.  Resting in bed. No family at bedside.  Afebrile.  02 sats stable on room air.  Very limited progress with therapy - reports that she has \"neurocardiogenic syncope\" and therapy \"doesn't know what they're doing\" to keep her from passing out. She reports that she would like to complete therapy at home as \"all that's left is legs\". She states, \"I know I won't pass out at home.\" Awaiting general surgery re-eval of hematoma. Potassium remains low. Patient has refused potassium supplements at times.       Review of Systems   Constitutional: Positive for malaise/fatigue. Negative for chills, decreased appetite, weight gain and weight loss.   HENT: Negative for congestion, ear discharge, hoarse voice and tinnitus.    Eyes: Negative for blurred vision, discharge, visual disturbance and visual halos.   Cardiovascular: Negative for chest pain, claudication, dyspnea on exertion, irregular heartbeat, leg swelling, orthopnea and paroxysmal nocturnal dyspnea.   Respiratory: Negative for cough, shortness of breath, sputum production and wheezing.    Endocrine: Negative for cold intolerance, heat intolerance and polyuria.   Hematologic/Lymphatic: Negative for adenopathy. Does not bruise/bleed easily.   Skin: Negative for dry skin, itching and suspicious lesions.   Musculoskeletal: Negative for arthritis, back pain, falls, joint pain, muscle weakness and myalgias.   Gastrointestinal: Positive for nausea. Negative for abdominal pain, constipation, diarrhea, dysphagia and hematemesis.   Genitourinary: Negative for bladder incontinence, dysuria and frequency. "   Neurological: Positive for weakness. Negative for aphonia, disturbances in coordination and dizziness.   Psychiatric/Behavioral: Negative for altered mental status, depression, memory loss and substance abuse. The patient does not have insomnia and is not nervous/anxious.          Medications:     Allergies:   Allergies   Allergen Reactions   • Coconut Unknown - High Severity   • Nuts Unknown - High Severity   • Penicillins        Current Meds:   Current Facility-Administered Medications:   •  acetaminophen (TYLENOL) tablet 650 mg, 650 mg, Oral, Q6H PRN **OR** acetaminophen (TYLENOL) suppository 650 mg, 650 mg, Rectal, Q6H PRN, Tommy Linares MD  •  acetaminophen (TYLENOL) tablet 1,000 mg, 1,000 mg, Oral, Q4H PRN, Tommy Linares MD  •  ALPRAZolam (XANAX) tablet 0.25 mg, 0.25 mg, Oral, BID PRN, Tommy Linares MD  •  ascorbic acid (VITAMIN C) tablet 500 mg, 500 mg, Oral, Daily, Tommy Linares MD  •  cholecalciferol (VITAMIN D3) tablet 1,000 Units, 1,000 Units, Oral, Daily, Tommy Linares MD  •  cloNIDine (CATAPRES-TTS) 0.1 MG/24HR patch 1 patch, 1 patch, Transdermal, Weekly, Tommy Linares MD  •  dextrose (D50W) 25 g/ 50mL Intravenous Solution 25 g, 25 g, Intravenous, Q15 Min PRN, Tommy Linares MD  •  dextrose (GLUTOSE) oral gel 15 g, 15 g, Oral, Q15 Min PRN, Tommy Linares MD  •  epoetin mindy-epbx (RETACRIT) injection 10,000 Units, 10,000 Units, Subcutaneous, Once per day on Mon Wed Fri, Tommy Linares MD  •  glucagon (human recombinant) (GLUCAGEN DIAGNOSTIC) injection 1 mg, 1 mg, Subcutaneous, Q15 Min PRN, Tommy Linares MD  •  heparin (porcine) injection 3,600 Units, 3,600 Units, Intracatheter, PRN, Tommy Linares MD  •  hydrALAZINE (APRESOLINE) injection 10 mg, 10 mg, Intravenous, Q6H PRN, Tommy Linares MD  •  hydrocortisone-bacitracin-zinc oxide-nystatin (MAGIC BARRIER) ointment 1 application, 1 application,  Topical, TID, Dea Puentes MD  •  labetalol (NORMODYNE,TRANDATE) injection 20 mg, 20 mg, Intravenous, Q6H PRN, Tommy Linares MD  •  lidocaine (LIDODERM) 5 % 1 patch, 1 patch, Transdermal, Q24H, Tommy Linares MD  •  lidocaine (LIDODERM) 5 % 1 patch, 1 patch, Transdermal, Q24H, Tommy Linares MD  •  magnesium oxide (MAG-OX) tablet 400 mg, 400 mg, Oral, Daily, Elmer Johnson MD  •  metoprolol tartrate (LOPRESSOR) tablet 50 mg, 50 mg, Oral, Q12H, Elmer Johnson MD  •  midodrine (PROAMATINE) tablet 2.5 mg, 2.5 mg, Oral, BID AC, Elmer Johnson MD  •  naloxone (NARCAN) injection 0.2 mg, 0.2 mg, Intravenous, PRN, Tommy Linares MD  •  ondansetron (ZOFRAN) injection 4 mg, 4 mg, Intravenous, Q6H PRN, Tommy Linares MD  •  ondansetron (ZOFRAN) tablet 4 mg, 4 mg, Oral, Q6H PRN, Tommy Linares MD  •  pantoprazole (PROTONIX) EC tablet 40 mg, 40 mg, Oral, Q AM, Tommy Linares MD  •  potassium chloride (MICRO-K) CR capsule 40 mEq, 40 mEq, Oral, Daily, Elmer Johnson MD  •  saccharomyces boulardii (FLORASTOR) capsule 250 mg, 250 mg, Oral, BID, Tommy Linares MD  •  sennosides-docusate (PERICOLACE) 8.6-50 MG per tablet 1 tablet, 1 tablet, Oral, BID PRN, Tommy Linares MD  •  sodium chloride 0.9 % flush 10 mL, 10 mL, Intravenous, Q12H, Tommy Linares MD  •  sodium chloride 0.9 % flush 10 mL, 10 mL, Intravenous, PRN, Tommy Linares MD  •  sodium chloride injection 40 mEq, 10 mL, Intravenous, Q5 Min PRN, Tommy Linares MD  •  sodium citrate 4% anticoagulant solution, 4 mL, Intracatheter, PRN, Tommy Linares MD  •  spironolactone (ALDACTONE) tablet 50 mg, 50 mg, Oral, BID, Elmer Johnsno MD  •  SUMAtriptan (IMITREX) tablet 50 mg, 50 mg, Oral, Once, Tommy Linares MD  •  zinc sulfate (ZINCATE) capsule 220 mg, 220 mg, Oral, Daily, Tommy Linares MD    Data:  "    Code Status:    There are no questions and answers to display.       Family History   Problem Relation Age of Onset   • Colon cancer Maternal Aunt 52   • Fibromyalgia Mother    • Heart disease Mother    • Hypertension Mother    • Hodgkin's lymphoma Paternal Grandmother    • Ovarian cancer Neg Hx    • Breast cancer Neg Hx        Social History     Socioeconomic History   • Marital status:      Spouse name: Not on file   • Number of children: Not on file   • Years of education: Not on file   • Highest education level: Not on file   Tobacco Use   • Smoking status: Never Smoker   • Smokeless tobacco: Never Used   Substance and Sexual Activity   • Alcohol use: No   • Drug use: No       Vitals:  Ht 170.2 cm (67\")   Wt 95 kg (209 lb 8 oz)   LMP  (LMP Unknown)   BMI 32.81 kg/m²   T 97.0 P 98 R 33 /91 Sp02 96% (room air)    I/O (24Hr):  No intake or output data in the 24 hours ending 10/05/21 1452    Labs and imaging:      Recent Results (from the past 12 hour(s))   Basic Metabolic Panel    Collection Time: 10/05/21  4:16 AM    Specimen: Blood   Result Value Ref Range    Glucose 99 65 - 99 mg/dL    BUN 10 6 - 20 mg/dL    Creatinine 0.77 0.57 - 1.00 mg/dL    Sodium 138 136 - 145 mmol/L    Potassium 2.6 (C) 3.5 - 5.2 mmol/L    Chloride 104 98 - 107 mmol/L    CO2 22.0 22.0 - 29.0 mmol/L    Calcium 9.2 8.6 - 10.5 mg/dL    eGFR Non African Amer 81 >60 mL/min/1.73    BUN/Creatinine Ratio 13.0 7.0 - 25.0    Anion Gap 12.0 5.0 - 15.0 mmol/L   Magnesium    Collection Time: 10/05/21  4:16 AM    Specimen: Blood   Result Value Ref Range    Magnesium 1.7 1.6 - 2.6 mg/dL                 Physical Examination:        Physical Exam  Vitals and nursing note reviewed.   Constitutional:       Appearance: Normal appearance.   HENT:      Head: Normocephalic.      Right Ear: External ear normal.      Left Ear: External ear normal.      Nose: Nose normal.      Mouth/Throat:      Mouth: Mucous membranes are moist.      Pharynx: " Oropharynx is clear.   Eyes:      Extraocular Movements: Extraocular movements intact.      Conjunctiva/sclera: Conjunctivae normal.      Pupils: Pupils are equal, round, and reactive to light.   Cardiovascular:      Rate and Rhythm: Normal rate and regular rhythm.      Heart sounds: Murmur heard.     Pulmonary:      Effort: Pulmonary effort is normal.      Breath sounds: Normal breath sounds.   Abdominal:      General: Bowel sounds are normal. There is distension.      Palpations: Abdomen is soft.   Musculoskeletal:      Cervical back: Normal range of motion and neck supple.      Comments: Generalized weakness   Skin:     General: Skin is warm and dry.   Neurological:      Mental Status: She is alert and oriented to person, place, and time.   Psychiatric:         Mood and Affect: Mood normal.         Behavior: Behavior normal.           Assessment:            * No active hospital problems. *    Past Medical History:   Diagnosis Date   • Angina at rest (CMS/HCC)    • Migraine     Sees Pain Management for injections.    • MVP (mitral valve prolapse)    • Syncope         Plan:        1. Acute RLL pulmonary embolism  2. S/p COVID 19  3. Acute renal failure  4. Rectus sheath and pelvic hematoma  5. Multifactorial anemia  6. MVP  7. HTN  8. Hypokalemia    Continue current treatment. Monitor counts. Increase activity as tolerated. Aggressive therapies.  Monitor renal function. Labs Thursday. Continue potassium replacement. Encourage participation with therapies. Continue off anticoagulation due to hematomas. Consult general surgery. Encourage compliance with treatment. Nephrology has ordered holding lopressor and using midodrine prior to getting out of bed working with therapy.    Electronically signed by BERT Jamison on 10/5/2021 at 14:52 CDT

## 2021-10-05 NOTE — CONSULTS
Tiffanie Scott MD Consult Note      Patient Care Team:  David Lara MD as PCP - General  David Lara MD as PCP - Family Medicine    Chief complaint pelvic hematoma    Subjective .     History of present illness: Patient known to me from hospitalization earlier.  She has a large pelvic hematoma from a very complicated course after having Covid.  This is led to significant problems due to its size.  Notably its causes colonic obstruction.  Her bowels are moving and she is having some liquid stools but still is distended.  CT scan done recently continues to show a large pelvic hematoma with some dilated Tatian of the proximal sigmoid and descending colon with collapse of the more distal sigmoid around this hematoma.  In addition she has some hydronephrosis and possibly femoral vein thrombosis.    Review of Systems  Pertinent items are noted in HPI, all other systems reviewed and negative    History  Past Medical History:   Diagnosis Date   • Angina at rest (CMS/HCC)    • Migraine     Sees Pain Management for injections.    • MVP (mitral valve prolapse)    • Syncope    ,   Past Surgical History:   Procedure Laterality Date   • BREAST BIOPSY Right 2011    benign   • INSERTION HEMODIALYSIS CATHETER Left 9/16/2021    Procedure: HEMODIALYSIS CATHETER INSERTION;  Surgeon: Anders Kaur MD;  Location: [place] [id number];  Service: Vascular;  Laterality: Left;   • TONSILLECTOMY     ,   Family History   Problem Relation Age of Onset   • Colon cancer Maternal Aunt 52   • Fibromyalgia Mother    • Heart disease Mother    • Hypertension Mother    • Hodgkin's lymphoma Paternal Grandmother    • Ovarian cancer Neg Hx    • Breast cancer Neg Hx    ,   Social History     Tobacco Use   • Smoking status: Never Smoker   • Smokeless tobacco: Never Used   Substance Use Topics   • Alcohol use: No   • Drug use: No   ,   Medications Prior to Admission   Medication Sig Dispense Refill Last Dose   • acetaminophen  (TYLENOL) 325 MG tablet Take 2 tablets by mouth Every 6 (Six) Hours As Needed for Mild Pain , Fever or Headache (fever greater than 101.5 F or headache).      • [] ALPRAZolam (XANAX) 0.25 MG tablet Take 1 tablet by mouth 2 (Two) Times a Day As Needed for Anxiety for up to 5 days.      • bisacodyl (DULCOLAX) 10 MG suppository Insert 1 suppository into the rectum Daily.      • cloNIDine (CATAPRES-TTS) 0.1 MG/24HR patch Place 1 patch on the skin as directed by provider 1 (One) Time Per Week.      • dextrose (D50W) 50 % solution Infuse 50 mL into a venous catheter Every 15 (Fifteen) Minutes As Needed for Low Blood Sugar (Blood Sugar Less Than 70).      • dextrose (GLUTOSE) 40 % gel Take 15 g by mouth Every 15 (Fifteen) Minutes As Needed for Low Blood Sugar (Blood sugar less than 70).      • epoetin mindy-epbx (RETACRIT) 19011 UNIT/ML injection Inject 1 mL under the skin into the appropriate area as directed 3 (Three) Times a Week. Indications: ESRD on Dialysis 6.6 mL     • fluconazole (DIFLUCAN) 200-0.9 MG/100ML-% IVPB Infuse 100 mL into a venous catheter Daily for 10 doses. Indications: Urinary Tract Infection 1000 mL 0    • glucagon, human recombinant, (GLUCAGEN DIAGNOSTIC) 1 MG injection Inject 1 mg under the skin into the appropriate area as directed Every 15 (Fifteen) Minutes As Needed (Blood Glucose Less Than 70) for up to 360 days.      • Heparin Sodium, Porcine, (heparin, porcine,) 1000 UNIT/ML injection 3.6 mL by Intracatheter route As Needed (after dialysis). Indications: Other - full anticoagulation      • insulin regular (humuLIN R,novoLIN R) 100 UNIT/ML injection Inject 0-9 Units under the skin into the appropriate area as directed Every 6 (Six) Hours.  12    • lidocaine (LIDODERM) 5 % Place 1 patch on the skin as directed by provider Daily. Remove & Discard patch within 12 hours or as directed by MD      • lidocaine (LIDODERM) 5 % Place 1 patch on the skin as directed by provider Daily. Remove &  Discard patch within 12 hours or as directed by MD      • metoprolol tartrate (LOPRESSOR) 25 MG tablet Take 1 tablet by mouth Every 12 (Twelve) Hours.      • ondansetron (ZOFRAN) 4 MG/2ML injection Infuse 2 mL into a venous catheter Every 6 (Six) Hours As Needed for Nausea or Vomiting.      • pantoprazole (PROTONIX) 40 MG EC tablet Take 1 tablet by mouth Every Morning.      • sennosides-docusate (PERICOLACE) 8.6-50 MG per tablet Take 1 tablet by mouth 2 (Two) Times a Day.      , Scheduled Meds:  ascorbic acid, 500 mg, Oral, Daily  cholecalciferol, 1,000 Units, Oral, Daily  cloNIDine, 1 patch, Transdermal, Weekly  epoetin mindy/mindy-epbx, 10,000 Units, Subcutaneous, Once per day on Mon Wed Fri  hydrocortisone-bacitracin-zinc oxide-nystatin, 1 application, Topical, TID  lidocaine, 1 patch, Transdermal, Q24H  lidocaine, 1 patch, Transdermal, Q24H  magnesium oxide, 400 mg, Oral, Daily  metoprolol tartrate, 50 mg, Oral, Q12H  midodrine, 2.5 mg, Oral, BID AC  pantoprazole, 40 mg, Oral, Q AM  potassium chloride, 40 mEq, Oral, Daily  saccharomyces boulardii, 250 mg, Oral, BID  sodium chloride, 10 mL, Intravenous, Q12H  spironolactone, 50 mg, Oral, BID  SUMAtriptan, 50 mg, Oral, Once  zinc sulfate, 220 mg, Oral, Daily    , Continuous Infusions:   , PRN Meds:  •  acetaminophen **OR** acetaminophen  •  acetaminophen  •  ALPRAZolam  •  dextrose  •  dextrose  •  glucagon (human recombinant)  •  heparin (porcine)  •  hydrALAZINE  •  labetalol  •  naloxone  •  ondansetron  •  ondansetron  •  senna-docusate sodium  •  sodium chloride  •  sodium chloride  •  Sodium Citrate and Allergies:  Coconut, Nuts, and Penicillins    Objective     Vital Signs        Intake & Output (last 3 days)     None           Physical Exam:     General Appearance:    Alert, cooperative, in no acute distress   Head:    Normocephalic, without obvious abnormality, atraumatic   Eyes:            Lids and lashes normal, conjunctivae and sclerae normal, no    icterus, no pallor, corneas clear, PERRLA   Ears:    Ears appear intact with no abnormalities noted   Neck:   No adenopathy, supple, trachea midline   Back:     No kyphosis present, no scoliosis present, no skin lesions,      erythema or scars, no tenderness to percussion or                   palpation,  range of motion normal   Lungs:     Clear to auscultation,respirations regular, even and                  unlabored    Heart:    Regular rhythm and normal rate, normal S1 and S2, no            murmur, no gallop, no rub, no click   Chest Wall:    No abnormalities observed   Abdomen:    Very distended mildly tender on that left side.   Rectal:     Deferred   Extremities:   Moves all extremities well, no edema, no cyanosis, no             redness   Pulses:   Pulses palpable and equal bilaterally   Skin:   No bleeding, bruising or rash   Lymph nodes:   No palpable adenopathy   Neurologic:   No focal deficits       Lab Results (last 72 hours)     Procedure Component Value Units Date/Time    Basic Metabolic Panel [[id number]]  (Abnormal) Collected: 10/05/21 0416    Specimen: Blood Updated: 10/05/21 0546     Glucose 99 mg/dL      BUN 10 mg/dL      Creatinine 0.77 mg/dL      Sodium 138 mmol/L      Potassium 2.6 mmol/L      Chloride 104 mmol/L      CO2 22.0 mmol/L      Calcium 9.2 mg/dL      eGFR Non African Amer 81 mL/min/1.73      BUN/Creatinine Ratio 13.0     Anion Gap 12.0 mmol/L     Narrative:      GFR Normal >60  Chronic Kidney Disease <60  Kidney Failure <15      Magnesium [[id number]]  (Normal) Collected: 10/05/21 0416    Specimen: Blood Updated: 10/05/21 0540     Magnesium 1.7 mg/dL     Comprehensive Metabolic Panel [[id number]]  (Abnormal) Collected: 10/04/21 0445    Specimen: Blood Updated: 10/04/21 0632     Glucose 101 mg/dL      BUN 13 mg/dL      Creatinine 0.90 mg/dL      Sodium 140 mmol/L      Potassium 3.0 mmol/L      Chloride 105 mmol/L      CO2 22.0 mmol/L      Calcium 9.7 mg/dL      Total Protein 6.1 g/dL       Albumin 3.00 g/dL      ALT (SGPT) 20 U/L      AST (SGOT) 23 U/L      Alkaline Phosphatase 91 U/L      Total Bilirubin 0.6 mg/dL      eGFR Non African Amer 68 mL/min/1.73      Globulin 3.1 gm/dL      A/G Ratio 1.0 g/dL      BUN/Creatinine Ratio 14.4     Anion Gap 13.0 mmol/L     Narrative:      GFR Normal >60  Chronic Kidney Disease <60  Kidney Failure <15      Magnesium [[id number]]  (Normal) Collected: 10/04/21 0445    Specimen: Blood Updated: 10/04/21 0632     Magnesium 1.7 mg/dL     BNP [[id number]]  (Abnormal) Collected: 10/04/21 0445    Specimen: Blood Updated: 10/04/21 0632     proBNP 1,187.0 pg/mL     Narrative:      Among patients with dyspnea, NT-proBNP is highly sensitive for the detection of acute congestive heart failure. In addition NT-proBNP of <300 pg/ml effectively rules out acute congestive heart failure with 99% negative predictive value.    Results may be falsely decreased if patient taking Biotin.      CBC & Differential [[id number]]  (Abnormal) Collected: 10/04/21 0445    Specimen: Blood Updated: 10/04/21 0604    Narrative:      The following orders were created for panel order CBC & Differential.  Procedure                               Abnormality         Status                     ---------                               -----------         ------                     CBC Auto Differential[[id number]]        Abnormal            Final result                 Please view results for these tests on the individual orders.    CBC Auto Differential [[id number]]  (Abnormal) Collected: 10/04/21 0445    Specimen: Blood Updated: 10/04/21 0604     WBC 10.81 10*3/mm3      RBC 3.14 10*6/mm3      Hemoglobin 9.7 g/dL      Hematocrit 31.2 %      MCV 99.4 fL      MCH 30.9 pg      MCHC 31.1 g/dL      RDW 15.7 %      RDW-SD 56.3 fl      MPV 9.6 fL      Platelets 439 10*3/mm3      Neutrophil % 74.4 %      Lymphocyte % 8.8 %      Monocyte % 10.5 %      Eosinophil % 3.0 %      Basophil % 0.9 %      Immature Grans % 2.4 %       Neutrophils, Absolute 8.04 10*3/mm3      Lymphocytes, Absolute 0.95 10*3/mm3      Monocytes, Absolute 1.14 10*3/mm3      Eosinophils, Absolute 0.32 10*3/mm3      Basophils, Absolute 0.10 10*3/mm3      Immature Grans, Absolute 0.26 10*3/mm3      nRBC 0.0 /100 WBC     Basic Metabolic Panel [[id number]]  (Abnormal) Collected: 10/03/21 0614    Specimen: Blood Updated: 10/03/21 0702     Glucose 104 mg/dL      BUN 16 mg/dL      Creatinine 0.92 mg/dL      Sodium 137 mmol/L      Potassium 3.0 mmol/L      Chloride 104 mmol/L      CO2 23.0 mmol/L      Calcium 9.5 mg/dL      eGFR Non African Amer 66 mL/min/1.73      BUN/Creatinine Ratio 17.4     Anion Gap 10.0 mmol/L     Narrative:      GFR Normal >60  Chronic Kidney Disease <60  Kidney Failure <15          Imaging Results (Last 72 Hours)     Procedure Component Value Units Date/Time    CT Abdomen Pelvis With Contrast [[id number]] Collected: 10/03/21 2037     Updated: 10/03/21 2049    Narrative:      EXAMINATION:   CT ABDOMEN PELVIS W CONTRAST-  10/3/2021 8:37 PM CDT     HISTORY: CT ABDOMEN AND PELVIS WITH CONTRAST 10/3/2021 8:21 PM CDT     HISTORY: Abdominal distention     COMPARISON: September 20, 2021.      DLP: 511 mGy cm     TECHNIQUE: Following the intravenous administration of contrast, helical  CT tomographic images of the abdomen and pelvis were acquired. Coronal  reformatted images were also provided for review.      FINDINGS:   Cavities present in the right lower lobe. Groundglass infiltrates are  present. Chronic change in the pulmonary parenchyma is present..      LIVER: No focal liver lesion. The hepatic vasculature is patent.      BILIARY SYSTEM: The gallbladder is contracted..      PANCREAS: No focal pancreatic lesion.      SPLEEN: Unremarkable.      KIDNEYS AND ADRENALS: Bilateral kidneys and adrenal glands are  unremarkable. Bilateral hydronephrosis is present. The right and left  ureter are obstructed. The.     RETROPERITONEUM: A large left pelvic mass is  present. This is been  described as a hematoma. This mass is 9 x 9.5 x 15 cm. This is a large  pelvic hematoma displacing the bladder from midline to the right and  causing bilateral obstructive uropathy. This also may be causing  thrombosis of the left common femoral vein..      GI TRACT: There is marked distention of the colon. It cannot be  determined if the pelvic mass is causing an obstruction at the level of  the sigmoid colon or if this is an ileus.. .     OTHER: . Abdominal aorta is unremarkable.. The osseous structures and  soft tissues demonstrate no worrisome lesions.     PELVIS: Large pelvic mass is present as described above. The bladder is  displaced from midline to the right..       Impression:      1. Large pelvic mass. This is a pelvic hematoma 9 x 9.4 x 15 cm. This  displaces the bladder from midline to the right. There is associated  bilateral hydronephrosis due to the mass effect on the bladder. There  also appears to be deep venous thrombosis in the left common femoral  vein. There are also appears to be a bowel obstruction at the level of  the sigmoid colon due to the mass effect from this large pelvic  hematoma..         This report was finalized on 10/03/2021 20:46 by Dr. Nadeem Marshall MD.    XR Abdomen KUB [[id number]] Collected: 10/03/21 0856     Updated: 10/03/21 0903    Narrative:      EXAMINATION:  XR ABDOMEN KUB-  10/2/2021 10:24 PM CDT     HISTORY: Nausea and vomiting      COMPARISON: 10/1/2021 and 9/20/2021 KUB     TECHNIQUE: Single view: KUB     FINDINGS:      A moderate amount of gaseous distention of bowel loops appreciated,  colon with small bowel loops centrally imaged similarly on yesterday's  examination.     Differential considerations include ileus. Distal colonic obstruction  not excluded.       Impression:      1. Persistent gaseous distention of bowel likely unchanged compared to  yesterday's examination.  This report was finalized on 10/03/2021 09:00 by Dr. Oskar Teresa  MD.                Assessment/Plan       * No active hospital problems. *      There is nothing to do from a surgical standpoint with this hematoma.  GI has previously been consulted and has declined colonoscopy which may help decompress the more proximal bowel.  The really only surgical option would be proximal diverting colostomy.  I would not recommend evacuation of hematoma to a laparoscopic or open procedure      Tiffanie Scott MD  10/05/21  16:09 CDT

## 2021-10-06 LAB
ANION GAP SERPL CALCULATED.3IONS-SCNC: 10 MMOL/L (ref 5–15)
BUN SERPL-MCNC: 8 MG/DL (ref 6–20)
BUN/CREAT SERPL: 11.3 (ref 7–25)
CALCIUM SPEC-SCNC: 9.3 MG/DL (ref 8.6–10.5)
CHLORIDE SERPL-SCNC: 105 MMOL/L (ref 98–107)
CO2 SERPL-SCNC: 21 MMOL/L (ref 22–29)
CREAT SERPL-MCNC: 0.71 MG/DL (ref 0.57–1)
GFR SERPL CREATININE-BSD FRML MDRD: 89 ML/MIN/1.73
GLUCOSE SERPL-MCNC: 98 MG/DL (ref 65–99)
POTASSIUM SERPL-SCNC: 3.8 MMOL/L (ref 3.5–5.2)
SODIUM SERPL-SCNC: 136 MMOL/L (ref 136–145)

## 2021-10-06 PROCEDURE — 80048 BASIC METABOLIC PNL TOTAL CA: CPT | Performed by: INTERNAL MEDICINE

## 2021-10-06 PROCEDURE — 25010000002 EPOETIN ALFA-EPBX 10000 UNIT/ML SOLUTION: Performed by: INTERNAL MEDICINE

## 2021-10-06 PROCEDURE — 97535 SELF CARE MNGMENT TRAINING: CPT

## 2021-10-06 PROCEDURE — 97530 THERAPEUTIC ACTIVITIES: CPT

## 2021-10-06 PROCEDURE — 25010000003 POTASSIUM CHLORIDE 10 MEQ/100ML SOLUTION: Performed by: INTERNAL MEDICINE

## 2021-10-06 NOTE — PROGRESS NOTES
TED Moe APRN        Internal Medicine Progress Note    10/6/2021   10:14 CDT    Name:  Namita Zabala  MRN:    [id number]     Acct:     [id number]   Room:  [address] [place] [address] Day: 0     Admit Date: 9/24/2021  4:31 PM  PCP: David Lara MD    Subjective:     C/C: weakness    Interval History: Status:  stayed the same.  Resting in bed. No family at bedside. Sleeping.  Afebrile.  02 sats stable on room air.  Very limited progress with therapy.  Potassium count WNL. Counts otherwise stable. Appreciate general surgery input.     Review of Systems   Constitutional: Positive for malaise/fatigue. Negative for chills, decreased appetite, weight gain and weight loss.   HENT: Negative for congestion, ear discharge, hoarse voice and tinnitus.    Eyes: Negative for blurred vision, discharge, visual disturbance and visual halos.   Cardiovascular: Negative for chest pain, claudication, dyspnea on exertion, irregular heartbeat, leg swelling, orthopnea and paroxysmal nocturnal dyspnea.   Respiratory: Negative for cough, shortness of breath, sputum production and wheezing.    Endocrine: Negative for cold intolerance, heat intolerance and polyuria.   Hematologic/Lymphatic: Negative for adenopathy. Does not bruise/bleed easily.   Skin: Negative for dry skin, itching and suspicious lesions.   Musculoskeletal: Negative for arthritis, back pain, falls, joint pain, muscle weakness and myalgias.   Gastrointestinal: Positive for nausea. Negative for abdominal pain, constipation, diarrhea, dysphagia and hematemesis.   Genitourinary: Negative for bladder incontinence, dysuria and frequency.   Neurological: Positive for weakness. Negative for aphonia, disturbances in coordination and dizziness.   Psychiatric/Behavioral: Negative for altered mental status, depression, memory loss and substance abuse. The patient does not have insomnia and is not nervous/anxious.          Medications:      Allergies:   Allergies   Allergen Reactions   • Coconut Unknown - High Severity   • Nuts Unknown - High Severity   • Penicillins        Current Meds:   Current Facility-Administered Medications:   •  acetaminophen (TYLENOL) tablet 650 mg, 650 mg, Oral, Q6H PRN **OR** acetaminophen (TYLENOL) suppository 650 mg, 650 mg, Rectal, Q6H PRN, Tommy Linares MD  •  acetaminophen (TYLENOL) tablet 1,000 mg, 1,000 mg, Oral, Q4H PRN, Tommy Linares MD  •  ALPRAZolam (XANAX) tablet 0.25 mg, 0.25 mg, Oral, BID PRN, Tommy Linares MD  •  ascorbic acid (VITAMIN C) tablet 500 mg, 500 mg, Oral, Daily, Tommy Linares MD  •  cholecalciferol (VITAMIN D3) tablet 1,000 Units, 1,000 Units, Oral, Daily, Tommy Linares MD  •  cloNIDine (CATAPRES-TTS) 0.1 MG/24HR patch 1 patch, 1 patch, Transdermal, Weekly, Tommy Linares MD  •  dextrose (D50W) 25 g/ 50mL Intravenous Solution 25 g, 25 g, Intravenous, Q15 Min PRN, Tommy Linares MD  •  dextrose (GLUTOSE) oral gel 15 g, 15 g, Oral, Q15 Min PRN, Tommy Linares MD  •  epoetin mindy-epbx (RETACRIT) injection 10,000 Units, 10,000 Units, Subcutaneous, Once per day on Mon Wed Fri, Tommy Linares MD  •  glucagon (human recombinant) (GLUCAGEN DIAGNOSTIC) injection 1 mg, 1 mg, Subcutaneous, Q15 Min PRN, Tommy Linares MD  •  heparin (porcine) injection 3,600 Units, 3,600 Units, Intracatheter, PRN, Tommy Linares MD  •  hydrALAZINE (APRESOLINE) injection 10 mg, 10 mg, Intravenous, Q6H PRN, Tommy Linares MD  •  hydrocortisone-bacitracin-zinc oxide-nystatin (MAGIC BARRIER) ointment 1 application, 1 application, Topical, TID, Dea Puentes MD  •  labetalol (NORMODYNE,TRANDATE) injection 20 mg, 20 mg, Intravenous, Q6H PRN, Tommy Linares MD  •  lidocaine (LIDODERM) 5 % 1 patch, 1 patch, Transdermal, Q24H, Tommy Linares MD  •  lidocaine (LIDODERM) 5 % 1 patch, 1 patch, Transdermal, Q24H,  Tommy Linares MD  •  magnesium oxide (MAG-OX) tablet 400 mg, 400 mg, Oral, Daily, Elmer Johnson MD  •  metoprolol tartrate (LOPRESSOR) tablet 50 mg, 50 mg, Oral, Q12H, Elmer Johnson MD  •  midodrine (PROAMATINE) tablet 2.5 mg, 2.5 mg, Oral, BID AC, Elmer Johnson MD  •  naloxone (NARCAN) injection 0.2 mg, 0.2 mg, Intravenous, PRN, Tommy Linares MD  •  ondansetron (ZOFRAN) injection 4 mg, 4 mg, Intravenous, Q6H PRN, Tommy Linares MD  •  ondansetron (ZOFRAN) tablet 4 mg, 4 mg, Oral, Q6H PRN, Tommy Linares MD  •  pantoprazole (PROTONIX) EC tablet 40 mg, 40 mg, Oral, Q AM, Tommy Linares MD  •  potassium chloride (MICRO-K) CR capsule 40 mEq, 40 mEq, Oral, Daily, Elmer Johnson MD  •  saccharomyces boulardii (FLORASTOR) capsule 250 mg, 250 mg, Oral, BID, Tommy Linares MD  •  sennosides-docusate (PERICOLACE) 8.6-50 MG per tablet 1 tablet, 1 tablet, Oral, BID PRN, Tommy Linares MD  •  sodium chloride 0.9 % flush 10 mL, 10 mL, Intravenous, Q12H, Tommy Linares MD  •  sodium chloride 0.9 % flush 10 mL, 10 mL, Intravenous, PRN, Tommy Linares MD  •  sodium chloride injection 40 mEq, 10 mL, Intravenous, Q5 Min PRN, Tommy Linares MD  •  sodium citrate 4% anticoagulant solution, 4 mL, Intracatheter, PRN, Tommy iLnares MD  •  spironolactone (ALDACTONE) tablet 50 mg, 50 mg, Oral, BID, Elmer Johnson MD  •  SUMAtriptan (IMITREX) tablet 50 mg, 50 mg, Oral, Once, Tommy Linares MD  •  zinc sulfate (ZINCATE) capsule 220 mg, 220 mg, Oral, Daily, Tommy Linares MD    Data:     Code Status:    There are no questions and answers to display.       Family History   Problem Relation Age of Onset   • Colon cancer Maternal Aunt 52   • Fibromyalgia Mother    • Heart disease Mother    • Hypertension Mother    • Hodgkin's lymphoma Paternal Grandmother    • Ovarian cancer Neg Hx    •  "Breast cancer Neg Hx        Social History     Socioeconomic History   • Marital status:      Spouse name: Not on file   • Number of children: Not on file   • Years of education: Not on file   • Highest education level: Not on file   Tobacco Use   • Smoking status: Never Smoker   • Smokeless tobacco: Never Used   Substance and Sexual Activity   • Alcohol use: No   • Drug use: No       Vitals:  Ht 170.2 cm (67\")   Wt 95 kg (209 lb 8 oz)   LMP  (LMP Unknown)   BMI 32.81 kg/m²   T 97.2 P96 R 29 /92 Sp02 100% (room air)    I/O (24Hr):  No intake or output data in the 24 hours ending 10/06/21 1014    Labs and imaging:      Recent Results (from the past 12 hour(s))   Basic Metabolic Panel    Collection Time: 10/06/21  4:27 AM    Specimen: Blood   Result Value Ref Range    Glucose 98 65 - 99 mg/dL    BUN 8 6 - 20 mg/dL    Creatinine 0.71 0.57 - 1.00 mg/dL    Sodium 136 136 - 145 mmol/L    Potassium 3.8 3.5 - 5.2 mmol/L    Chloride 105 98 - 107 mmol/L    CO2 21.0 (L) 22.0 - 29.0 mmol/L    Calcium 9.3 8.6 - 10.5 mg/dL    eGFR Non African Amer 89 >60 mL/min/1.73    BUN/Creatinine Ratio 11.3 7.0 - 25.0    Anion Gap 10.0 5.0 - 15.0 mmol/L                 Physical Examination:        Physical Exam  Vitals and nursing note reviewed.   Constitutional:       Appearance: Normal appearance.   HENT:      Head: Normocephalic.      Right Ear: External ear normal.      Left Ear: External ear normal.      Nose: Nose normal.      Mouth/Throat:      Mouth: Mucous membranes are moist.      Pharynx: Oropharynx is clear.   Eyes:      Extraocular Movements: Extraocular movements intact.      Conjunctiva/sclera: Conjunctivae normal.      Pupils: Pupils are equal, round, and reactive to light.   Cardiovascular:      Rate and Rhythm: Normal rate and regular rhythm.      Heart sounds: Murmur heard.     Pulmonary:      Effort: Pulmonary effort is normal.      Breath sounds: Normal breath sounds.   Abdominal:      General: Bowel " sounds are normal. There is distension.      Palpations: Abdomen is soft.   Musculoskeletal:      Cervical back: Normal range of motion and neck supple.      Comments: Generalized weakness   Skin:     General: Skin is warm and dry.   Neurological:      Mental Status: She is alert and oriented to person, place, and time.   Psychiatric:         Mood and Affect: Mood normal.         Behavior: Behavior normal.           Assessment:            * No active hospital problems. *    Past Medical History:   Diagnosis Date   • Angina at rest (CMS/HCC)    • Migraine     Sees Pain Management for injections.    • MVP (mitral valve prolapse)    • Syncope         Plan:        1. Acute RLL pulmonary embolism  2. S/p COVID 19  3. Acute renal failure  4. Rectus sheath and pelvic hematoma  5. Multifactorial anemia  6. MVP  7. HTN  8. Hypokalemia    Continue current treatment. Monitor counts. Increase activity as tolerated. Aggressive therapies.  Monitor renal function. Labs in am. Continue potassium replacement. Encourage participation with therapies. Continue off anticoagulation due to hematomas. Consult general surgery. Encourage compliance with treatment. Nephrology has ordered holding lopressor and using midodrine prior to getting out of bed working with therapy. Anticoagulation contraindicated due to large pelvic and rectus sheath hematomas. Appreciate general surgery evaluation and input.     Electronically signed by BERT Jamison on 10/6/2021 at 10:14 CDT

## 2021-10-06 NOTE — PROGRESS NOTES
Nephrology ([facility]) Progress Note      Patient:  Namita Zabala  YOB: 1975  Date of Service: 10/6/2021  MRN: [id number]   Acct: [id number]   Primary Care Physician: David Lara MD  Advance Directive:   There are no questions and answers to display.     Admit Date: 9/24/2021       Hospital Day: 0  Referring Provider: Tommy Linares MD      Patient personally seen and examined.  Complete chart including Consults, Notes, Operative Reports, Labs, Cardiology, and Radiology studies reviewed as able.        Subjective:  Namita Zabala is a 46 y.o. female for whom we were consulted for evaluation and treatment of acute kidney injury.  Patient presented on 8/27 with dyspnea, hypoxia. Diagnosed with COVID-19 pneumonia. Had a CT angiogram which showed right pulmonary embolism. Intubated on 8/27. Renal function had been steadily declining since 9/2.  Hemoglobin dropped to <6 on 9/4 and imaging revealed large retroperitoneal hematoma causing bilateral hydronephrosis and shifting of bladder. Her renal function did not improve with IV fluids. Dr Kaur placed vascath on 9/8 and patient had first dialysis the same day. Extubated on 9/10. She later had permcath placed on 9/16, femoral line removed same day. Moved to medical floor but has returned to CCU for fever, tachycardia, chest pain. Patient has an NG tube. She was stabilized and was moved back to the medical floor. On September 24, she was moved to long-term acute care hospital for further care that include rehabilitation and continuation of dialysis.  On September 29, her hemodialysis treatment has been discontinued as she had shown good renal recovery.  On October 1, her permacath was removed.    Today, no overnight events.  Patient notes continued weakness.  Her potassium level had improved.  Denied current chest pain, shortness of air at rest, nausea or vomiting.    Temp- 97.2  Pulse- 96  Resp-  29  BP- 121/92  O2%- 100      Allergies:  Coconut, Nuts, and Penicillins    Home Meds:  Medications Prior to Admission   Medication Sig Dispense Refill Last Dose   • acetaminophen (TYLENOL) 325 MG tablet Take 2 tablets by mouth Every 6 (Six) Hours As Needed for Mild Pain , Fever or Headache (fever greater than 101.5 F or headache).      • [] ALPRAZolam (XANAX) 0.25 MG tablet Take 1 tablet by mouth 2 (Two) Times a Day As Needed for Anxiety for up to 5 days.      • bisacodyl (DULCOLAX) 10 MG suppository Insert 1 suppository into the rectum Daily.      • cloNIDine (CATAPRES-TTS) 0.1 MG/24HR patch Place 1 patch on the skin as directed by provider 1 (One) Time Per Week.      • dextrose (D50W) 50 % solution Infuse 50 mL into a venous catheter Every 15 (Fifteen) Minutes As Needed for Low Blood Sugar (Blood Sugar Less Than 70).      • dextrose (GLUTOSE) 40 % gel Take 15 g by mouth Every 15 (Fifteen) Minutes As Needed for Low Blood Sugar (Blood sugar less than 70).      • epoetin mindy-epbx (RETACRIT) 92582 UNIT/ML injection Inject 1 mL under the skin into the appropriate area as directed 3 (Three) Times a Week. Indications: ESRD on Dialysis 6.6 mL     • [] fluconazole (DIFLUCAN) 200-0.9 MG/100ML-% IVPB Infuse 100 mL into a venous catheter Daily for 10 doses. Indications: Urinary Tract Infection 1000 mL 0    • glucagon, human recombinant, (GLUCAGEN DIAGNOSTIC) 1 MG injection Inject 1 mg under the skin into the appropriate area as directed Every 15 (Fifteen) Minutes As Needed (Blood Glucose Less Than 70) for up to 360 days.      • Heparin Sodium, Porcine, (heparin, porcine,) 1000 UNIT/ML injection 3.6 mL by Intracatheter route As Needed (after dialysis). Indications: Other - full anticoagulation      • insulin regular (humuLIN R,novoLIN R) 100 UNIT/ML injection Inject 0-9 Units under the skin into the appropriate area as directed Every 6 (Six) Hours.  12    • lidocaine (LIDODERM) 5 % Place 1 patch on the skin as  directed by provider Daily. Remove & Discard patch within 12 hours or as directed by MD      • lidocaine (LIDODERM) 5 % Place 1 patch on the skin as directed by provider Daily. Remove & Discard patch within 12 hours or as directed by MD      • metoprolol tartrate (LOPRESSOR) 25 MG tablet Take 1 tablet by mouth Every 12 (Twelve) Hours.      • ondansetron (ZOFRAN) 4 MG/2ML injection Infuse 2 mL into a venous catheter Every 6 (Six) Hours As Needed for Nausea or Vomiting.      • pantoprazole (PROTONIX) 40 MG EC tablet Take 1 tablet by mouth Every Morning.      • sennosides-docusate (PERICOLACE) 8.6-50 MG per tablet Take 1 tablet by mouth 2 (Two) Times a Day.          Medicines:  Current Facility-Administered Medications   Medication Dose Route Frequency Provider Last Rate Last Admin   • acetaminophen (TYLENOL) tablet 650 mg  650 mg Oral Q6H PRN Tommy Linares MD        Or   • acetaminophen (TYLENOL) suppository 650 mg  650 mg Rectal Q6H PRN Tommy Linares MD       • acetaminophen (TYLENOL) tablet 1,000 mg  1,000 mg Oral Q4H PRN Tommy Linares MD       • ALPRAZolam (XANAX) tablet 0.25 mg  0.25 mg Oral BID PRN Tommy Linares MD       • ascorbic acid (VITAMIN C) tablet 500 mg  500 mg Oral Daily Tommy Linares MD       • cholecalciferol (VITAMIN D3) tablet 1,000 Units  1,000 Units Oral Daily Tommy Linares MD       • cloNIDine (CATAPRES-TTS) 0.1 MG/24HR patch 1 patch  1 patch Transdermal Weekly Tommy Linares MD       • dextrose (D50W) 25 g/ 50mL Intravenous Solution 25 g  25 g Intravenous Q15 Min PRN Tommy Linares MD       • dextrose (GLUTOSE) oral gel 15 g  15 g Oral Q15 Min PRN Tommy Linares MD       • glucagon (human recombinant) (GLUCAGEN DIAGNOSTIC) injection 1 mg  1 mg Subcutaneous Q15 Min PRN Tommy Linares MD       • heparin (porcine) injection 3,600 Units  3,600 Units Intracatheter PRN Tommy Linares MD       • hydrALAZINE  (APRESOLINE) injection 10 mg  10 mg Intravenous Q6H PRN Tommy Linares MD       • hydrocortisone-bacitracin-zinc oxide-nystatin (MAGIC BARRIER) ointment 1 application  1 application Topical TID Dea Puentes MD       • labetalol (NORMODYNE,TRANDATE) injection 20 mg  20 mg Intravenous Q6H PRN Tommy Linares MD       • lidocaine (LIDODERM) 5 % 1 patch  1 patch Transdermal Q24H Tommy Linares MD       • lidocaine (LIDODERM) 5 % 1 patch  1 patch Transdermal Q24H Tommy Linares MD       • magnesium oxide (MAG-OX) tablet 400 mg  400 mg Oral Daily Elmer Johnson MD       • metoprolol tartrate (LOPRESSOR) tablet 50 mg  50 mg Oral Q12H Elmer Johnson MD       • midodrine (PROAMATINE) tablet 2.5 mg  2.5 mg Oral BID AC Elmer Johnson MD       • naloxone (NARCAN) injection 0.2 mg  0.2 mg Intravenous PRN Tommy Linares MD       • ondansetron (ZOFRAN) injection 4 mg  4 mg Intravenous Q6H PRN Tommy Linares MD       • ondansetron (ZOFRAN) tablet 4 mg  4 mg Oral Q6H PRN Tommy Linares MD       • pantoprazole (PROTONIX) EC tablet 40 mg  40 mg Oral Q AM Tommy Linares MD       • potassium chloride (MICRO-K) CR capsule 40 mEq  40 mEq Oral Daily Elmer Johnson MD       • saccharomyces boulardii (FLORASTOR) capsule 250 mg  250 mg Oral BID Tommy Linares MD       • sennosides-docusate (PERICOLACE) 8.6-50 MG per tablet 1 tablet  1 tablet Oral BID PRN Tommy Linares MD       • sodium chloride 0.9 % flush 10 mL  10 mL Intravenous Q12H Tommy Linares MD       • sodium chloride 0.9 % flush 10 mL  10 mL Intravenous PRN Tommy Linares MD       • sodium chloride injection 40 mEq  10 mL Intravenous Q5 Min PRN Tommy Linares MD       • sodium citrate 4% anticoagulant solution  4 mL Intracatheter PRN Tommy Linares MD       • spironolactone (ALDACTONE) tablet 50 mg  50 mg Oral BID Elizabeth  "Elmer Vang MD       • SUMAtriptan (IMITREX) tablet 50 mg  50 mg Oral Once Tommy Linares MD       • zinc sulfate (ZINCATE) capsule 220 mg  220 mg Oral Daily Tommy Linares MD           Past Medical History:  Past Medical History:   Diagnosis Date   • Angina at rest (CMS/HCC)    • Migraine     Sees Pain Management for injections.    • MVP (mitral valve prolapse)    • Syncope        Past Surgical History:  Past Surgical History:   Procedure Laterality Date   • BREAST BIOPSY Right 2011    benign   • INSERTION HEMODIALYSIS CATHETER Left 9/16/2021    Procedure: HEMODIALYSIS CATHETER INSERTION;  Surgeon: Anders Kaur MD;  Location: [facility] OR 12;  Service: Vascular;  Laterality: Left;   • TONSILLECTOMY         Family History  Family History   Problem Relation Age of Onset   • Colon cancer Maternal Aunt 52   • Fibromyalgia Mother    • Heart disease Mother    • Hypertension Mother    • Hodgkin's lymphoma Paternal Grandmother    • Ovarian cancer Neg Hx    • Breast cancer Neg Hx        Social History  Social History     Socioeconomic History   • Marital status:      Spouse name: Not on file   • Number of children: Not on file   • Years of education: Not on file   • Highest education level: Not on file   Tobacco Use   • Smoking status: Never Smoker   • Smokeless tobacco: Never Used   Substance and Sexual Activity   • Alcohol use: No   • Drug use: No         Review of Systems:  History obtained from chart review and the patient  General ROS: No fever or chills  Respiratory ROS: No cough, shortness of breath, wheezing  Cardiovascular ROS: No chest pain or palpitations  Gastrointestinal ROS: No abdominal pain or melena  Genito-Urinary ROS: No dysuria or hematuria  Psych ROS: No anxiety and depression    Objective:  Patient Vitals for the past 24 hrs:   Height Weight   10/06/21 1323 170.2 cm (67\") --   10/06/21 1319 170.2 cm (67\") --   10/06/21 1318 170.2 cm (67\") 95 kg (209 lb 8 oz) "     No intake or output data in the 24 hours ending 10/06/21 1749  General: awake/alert   Chest:  clear to auscultation bilaterally without respiratory distress  CVS: regular rate and rhythm  Abdominal: soft, nontender, positive bowel sounds  Extremities: ble edema  Skin: warm and dry without rash      Labs:  Results from last 7 days   Lab Units 10/04/21  0445 10/02/21  0447 10/01/21  0459   WBC 10*3/mm3 10.81* 9.29 11.50*   HEMOGLOBIN g/dL 9.7* 9.3* 9.1*   HEMATOCRIT % 31.2* 29.5* 29.7*   PLATELETS 10*3/mm3 439 406 377         Results from last 7 days   Lab Units 10/06/21  0427 10/05/21  1901 10/05/21  0416 10/04/21  0445 10/04/21  0445   SODIUM mmol/L 136  --  138  --  140   POTASSIUM mmol/L 3.8 2.9* 2.6*   < > 3.0*   CHLORIDE mmol/L 105  --  104  --  105   CO2 mmol/L 21.0*  --  22.0  --  22.0   BUN mg/dL 8  --  10  --  13   CREATININE mg/dL 0.71  --  0.77  --  0.90   CALCIUM mg/dL 9.3  --  9.2  --  9.7   BILIRUBIN mg/dL  --   --   --   --  0.6   ALK PHOS U/L  --   --   --   --  91   ALT (SGPT) U/L  --   --   --   --  20   AST (SGOT) U/L  --   --   --   --  23   GLUCOSE mg/dL 98  --  99  --  101*    < > = values in this interval not displayed.       Radiology:   Imaging Results (Last 72 Hours)     ** No results found for the last 72 hours. **          Culture:  No results found for: BLOODCX, URINECX, WOUNDCX, MRSACX, RESPCX, STOOLCX      Assessment   Acute kidney injury stage III-now recovered and off dialysis  Obstructive uropathy  Left pelvic hematoma  COVID-19 pneumonia with acute hypoxemic respiratory failure-improved  Pulmonary embolism  Metabolic acidosis  Hypokalemia    Plan:  Diuretics adjusted, continue to monitor labs daily given variable potassium levels  No further need for dialysis anticipated  Encourage therapy      Brody Chow MD  10/6/2021  17:49 CDT

## 2021-10-06 NOTE — PROGRESS NOTES
"Adult Nutrition  Assessment/PES    Patient Name:  Namita Zabala  YOB: 1975  MRN: [id number]  Admit Date:  9/24/2021    Assessment Date:  10/6/2021    Comments:  Pt continues to have nausea, poor appetite. Recommend nutrition support to optimize nutrition. RD available upon consult for recommendations if pt agreeable and appropriate with POC. Will continue to follow.    Reason for Assessment     Row Name 10/06/21 1317          Reason for Assessment    Reason For Assessment  follow-up protocol     Diagnosis  infection/sepsis;pulmonary disease;renal disease;hematological/related complications     Identified At Risk by Screening Criteria  reduced oral intake over the last month         Nutrition/Diet History     Row Name 10/06/21 1317          Nutrition/Diet History    Typical Food/Fluid Intake  Pt downgraded to clear liquids, continues with nausea and drinking Sprite. Recommendations if TPN needed placed in progress note.     Food Allergies  peanut/tree nut;other (see comments) coconut     Factors Affecting Nutritional Intake  altered gastrointestinal function;nausea         Anthropometrics     Row Name 10/06/21 1323 10/06/21 1319       Anthropometrics    Height  170.2 cm (67\")  170.2 cm (67\")       Ideal Body Weight (IBW)    Ideal Body Weight (IBW) (kg)  61.86  61.86    Row Name 10/06/21 1318          Anthropometrics    Height  170.2 cm (67\")     Weight  95 kg (209 lb 8 oz)        Admit Weight    Admit Weight  102 kg (225 lb)        Ideal Body Weight (IBW)    Ideal Body Weight (IBW) (kg)  61.86     % Ideal Body Weight  153.61        Body Mass Index (BMI)    BMI (kg/m2)  32.88     BMI Assessment  BMI 30-34.9: obesity grade I         Labs/Tests/Procedures/Meds     Row Name 10/06/21 1318          Labs/Procedures/Meds    Lab Results Reviewed  reviewed, pertinent     Lab Results Comments  K+ WNL at this time        Diagnostic Tests/Procedures    Diagnostic Test/Procedure Reviewed  reviewed, pertinent " "       Medications    Pertinent Medications Comments  See MAR         Physical Findings     Row Name 10/06/21 1319          Physical Findings    Overall Physical Appearance  edematous;generalized wasting     Gastrointestinal  -- BM 10/5     Skin  edema;pressure injury         Estimated/Assessed Needs     Row Name 10/06/21 1323 10/06/21 1319       Calculation Measurements    Weight Used For Calculations  69.6 kg (153 lb 8 oz) AdjBW  69.6 kg (153 lb 8 oz) AdjBW    Height  170.2 cm (67\")  170.2 cm (67\")       Estimated/Assessed Needs    Additional Documentation  --  Fluid Requirements (Group);[place]. Jeor Equation (Group);Protein Requirements (Group)       Calorie Requirements    Weight Used For Calorie Calculations  --  69.6 kg (153 lb 8 oz) AdjBW       KCAL/KG    KCAL/KG  --  25 Kcal/Kg (kcal);30 Kcal/Kg (kcal);Specify Kcal/Kg (kcal)    25 Kcal/Kg (kcal)  1740.675  1740.675    30 Kcal/Kg (kcal)  2088.81  2088.81    kcal/kg (Specify)  --  22 1532 kcal/day       [place]-St. Jeor Equation    RMR ([place]. Jeor Equation)  1378.895  1378.895       Protein Requirements    Weight Used For Protein Calculations  --  61.2 kg (135 lb) IBW    Est Protein Requirement Amount (gms/kg)  --  1.2 gm protein    Estimated Protein Requirements (gms/day)  --  73.48       Fluid Requirements    Fluid Requirements (mL/day)  --  -- UOP + 1000 mL    Row Name 10/06/21 1318          Calculation Measurements    Height  170.2 cm (67\")         Nutrition Prescription Ordered     Row Name 10/06/21 1322          Nutrition Prescription PO    Current PO Diet  Clear Liquid         Evaluation of Received Nutrient/Fluid Intake     Row Name 10/06/21 1323          Calculation Measurements    Weight Used For Calculations  69.6 kg (153 lb 8 oz) AdjBW     Height  170.2 cm (67\")        Nutrient/Fluid Evaluation    Number of Days Evaluated  3 days        Fluid Intake Evaluation    Oral Fluid (mL)  1100 2-day average; one day/3-day lookback, pt was \"NPO\" in " I/O sheet     IV Fluid (mL)  1120 1 day (10/5)        PO Evaluation    Number of Days PO Intake Evaluated  Insufficient Data     % PO Intake  clear liquid diet; no %PO recorded, only intake in I/O sheet               Problem/Interventions:  Problem 1     Row Name 10/06/21 1325          Nutrition Diagnoses Problem 1    Problem 1  Needs Alternate TF currently on hold d/t ileus     Etiology (related to)  Medical Diagnosis     Gastrointestinal  Nausea     Infectious Disease  --     Signs/Symptoms (evidenced by)  Clear Liquid Diet     Percent (%) of estimated Kcal need  24 %         Problem 2     Row Name 10/06/21 1325          Nutrition Diagnoses Problem 2    Problem 2  Altered Nutrition Related to Labs     Etiology (related to)  Medication Interaction;Medical Diagnosis     Renal  JUVENAL     Signs/Symptoms (evidenced by)  Biochemical     Specific Labs Noted  BUN;Creatinine;K+     Resolved?  Yes at this time 10/6/21         Problem 3     Row Name 10/06/21 1326          Nutrition Diagnoses Problem 3    Problem 3  Predicted Suboptimal Intake     Etiology (related to)  Medical Diagnosis     Nutrition related  Increased nutrition needs     Hematological  Anemia     Pulmonary/Critical Care  Acute respiratory failure;Pulmonary emboli;Other (comment) s/p COVID, mass pressing on bladder     Renal  Hemodialysis     Signs/Symptoms (evidenced by)  Clear Lquid Diet     Reported/Observed By  Patient     Appetite  Poor     Reported GI Symptoms  Nausea and vomiting No vomiting           Intervention Goal     Row Name 10/06/21 1326          Intervention Goal    General  Reduce/improve symptoms     PO  Meet estimated needs     TF/PN  Inititiate TF/PN Recommend; if appropriate with POC     Weight  No significant weight loss         Nutrition Intervention     Row Name 10/06/21 1327          Nutrition Intervention    RD/Tech Action  Follow Tx progress;Care plan reviewd     Recommended/Ordered  PN Recommend         Nutrition Prescription      Row Name 10/06/21 1327          Nutrition Prescription PO    PO Prescription  Other (comment) Advance as tolerated        Other Orders    Other  If appropriate with POC, TPN to meet estimated energy and protein needs.         Education/Evaluation     Row Name 10/06/21 1329          Education    Education  No discharge needs identified at this time        Monitor/Evaluation    Monitor  Per protocol           Electronically signed by:  Morelia Warner RD  10/06/21 13:30 CDT

## 2021-10-07 LAB
ANION GAP SERPL CALCULATED.3IONS-SCNC: 12 MMOL/L (ref 5–15)
BASOPHILS # BLD AUTO: 0.09 10*3/MM3 (ref 0–0.2)
BASOPHILS NFR BLD AUTO: 0.8 % (ref 0–1.5)
BUN SERPL-MCNC: 9 MG/DL (ref 6–20)
BUN/CREAT SERPL: 11.1 (ref 7–25)
CALCIUM SPEC-SCNC: 9.7 MG/DL (ref 8.6–10.5)
CHLORIDE SERPL-SCNC: 103 MMOL/L (ref 98–107)
CO2 SERPL-SCNC: 21 MMOL/L (ref 22–29)
CREAT SERPL-MCNC: 0.81 MG/DL (ref 0.57–1)
DEPRECATED RDW RBC AUTO: 52.3 FL (ref 37–54)
EOSINOPHIL # BLD AUTO: 0.55 10*3/MM3 (ref 0–0.4)
EOSINOPHIL NFR BLD AUTO: 4.9 % (ref 0.3–6.2)
ERYTHROCYTE [DISTWIDTH] IN BLOOD BY AUTOMATED COUNT: 15.1 % (ref 12.3–15.4)
GFR SERPL CREATININE-BSD FRML MDRD: 77 ML/MIN/1.73
GLUCOSE SERPL-MCNC: 103 MG/DL (ref 65–99)
HCT VFR BLD AUTO: 31.7 % (ref 34–46.6)
HGB BLD-MCNC: 9.9 G/DL (ref 12–15.9)
IMM GRANULOCYTES # BLD AUTO: 0.41 10*3/MM3 (ref 0–0.05)
IMM GRANULOCYTES NFR BLD AUTO: 3.7 % (ref 0–0.5)
LYMPHOCYTES # BLD AUTO: 0.86 10*3/MM3 (ref 0.7–3.1)
LYMPHOCYTES NFR BLD AUTO: 7.7 % (ref 19.6–45.3)
MCH RBC QN AUTO: 29.7 PG (ref 26.6–33)
MCHC RBC AUTO-ENTMCNC: 31.2 G/DL (ref 31.5–35.7)
MCV RBC AUTO: 95.2 FL (ref 79–97)
MONOCYTES # BLD AUTO: 0.92 10*3/MM3 (ref 0.1–0.9)
MONOCYTES NFR BLD AUTO: 8.2 % (ref 5–12)
NEUTROPHILS NFR BLD AUTO: 74.7 % (ref 42.7–76)
NEUTROPHILS NFR BLD AUTO: 8.39 10*3/MM3 (ref 1.7–7)
NRBC BLD AUTO-RTO: 0 /100 WBC (ref 0–0.2)
PLATELET # BLD AUTO: 426 10*3/MM3 (ref 140–450)
PMV BLD AUTO: 9.2 FL (ref 6–12)
POTASSIUM SERPL-SCNC: 3.2 MMOL/L (ref 3.5–5.2)
RBC # BLD AUTO: 3.33 10*6/MM3 (ref 3.77–5.28)
SODIUM SERPL-SCNC: 136 MMOL/L (ref 136–145)
WBC # BLD AUTO: 11.22 10*3/MM3 (ref 3.4–10.8)

## 2021-10-07 PROCEDURE — 97530 THERAPEUTIC ACTIVITIES: CPT

## 2021-10-07 PROCEDURE — 85025 COMPLETE CBC W/AUTO DIFF WBC: CPT | Performed by: INTERNAL MEDICINE

## 2021-10-07 PROCEDURE — 80048 BASIC METABOLIC PNL TOTAL CA: CPT | Performed by: INTERNAL MEDICINE

## 2021-10-07 PROCEDURE — 25010000003 POTASSIUM CHLORIDE 10 MEQ/100ML SOLUTION: Performed by: INTERNAL MEDICINE

## 2021-10-07 RX ORDER — POTASSIUM CHLORIDE 7.45 MG/ML
10 INJECTION INTRAVENOUS
Status: DISPENSED | OUTPATIENT
Start: 2021-10-07 | End: 2021-10-07

## 2021-10-07 RX ORDER — ALPRAZOLAM 0.25 MG/1
0.25 TABLET ORAL 2 TIMES DAILY PRN
Status: DISCONTINUED | OUTPATIENT
Start: 2021-10-07 | End: 2021-10-12 | Stop reason: HOSPADM

## 2021-10-07 NOTE — PROGRESS NOTES
"[facility]  BERT Wu        Internal Medicine Progress Note    10/7/2021   14:58 CDT    Name:  Namita Zabala  MRN:    [id number]     Acct:     [id number]   Room:  [address] [place] [address] Day: 0     Admit Date: 9/24/2021  4:31 PM  PCP: David Lara MD    Subjective:     C/C: weakness    Interval History: Status:  stayed the same.  Resting in bed.  at bedside. Patient reportedly told staff and family that she hadn't seen a provider all week.  Afebrile. 02 sats stable on room air.  Very limited progress with therapy. Patient states that due to \"neurocardiogenic syncope\", she cannot participate with therapy without passing out. She is hopeful for discharge to home where she states that this will not be an issue. Patient and  informed that they will need to arrange for 24 hour in-home assistance as the  works full time and that whomever is chosen will need to be able to come to the facility to demonstrate proficiency. Explained several times to the patient and  that she is not a candidate for treatment of the pulmonary embolism due to the large hematomas she developed and that the risk outweighs the benefit of anticoagulation at this time. Potassium infusing at this time per IV. Patient has reportedly refused oral replacement, but states that she will be able to take over-the-counter tablets at home and that her potassium level won't be an issue.     Review of Systems   Constitutional: Positive for malaise/fatigue. Negative for chills, decreased appetite, weight gain and weight loss.   HENT: Negative for congestion, ear discharge, hoarse voice and tinnitus.    Eyes: Negative for blurred vision, discharge, visual disturbance and visual halos.   Cardiovascular: Negative for chest pain, claudication, dyspnea on exertion, irregular heartbeat, leg swelling, orthopnea and paroxysmal nocturnal dyspnea.   Respiratory: Negative for cough, shortness of " breath, sputum production and wheezing.    Endocrine: Negative for cold intolerance, heat intolerance and polyuria.   Hematologic/Lymphatic: Negative for adenopathy. Does not bruise/bleed easily.   Skin: Negative for dry skin, itching and suspicious lesions.   Musculoskeletal: Negative for arthritis, back pain, falls, joint pain, muscle weakness and myalgias.   Gastrointestinal: Positive for nausea. Negative for abdominal pain, constipation, diarrhea, dysphagia and hematemesis.   Genitourinary: Negative for bladder incontinence, dysuria and frequency.   Neurological: Positive for weakness. Negative for aphonia, disturbances in coordination and dizziness.   Psychiatric/Behavioral: Negative for altered mental status, depression, memory loss and substance abuse. The patient does not have insomnia and is not nervous/anxious.          Medications:     Allergies:   Allergies   Allergen Reactions   • Coconut Unknown - High Severity   • Nuts Unknown - High Severity   • Penicillins        Current Meds:   Current Facility-Administered Medications:   •  acetaminophen (TYLENOL) tablet 650 mg, 650 mg, Oral, Q6H PRN **OR** acetaminophen (TYLENOL) suppository 650 mg, 650 mg, Rectal, Q6H PRN, Tommy Linares MD  •  acetaminophen (TYLENOL) tablet 1,000 mg, 1,000 mg, Oral, Q4H PRN, Tommy Linares MD  •  ALPRAZolam (XANAX) tablet 0.25 mg, 0.25 mg, Oral, BID PRN, Tommy Linares MD  •  ascorbic acid (VITAMIN C) tablet 500 mg, 500 mg, Oral, Daily, Tommy Linares MD  •  cholecalciferol (VITAMIN D3) tablet 1,000 Units, 1,000 Units, Oral, Daily, Tommy Linares MD  •  cloNIDine (CATAPRES-TTS) 0.1 MG/24HR patch 1 patch, 1 patch, Transdermal, Weekly, Tommy Linares MD  •  dextrose (D50W) 25 g/ 50mL Intravenous Solution 25 g, 25 g, Intravenous, Q15 Min PRN, Tommy Linares MD  •  dextrose (GLUTOSE) oral gel 15 g, 15 g, Oral, Q15 Min PRN, Tommy Linares MD  •  glucagon (human  recombinant) (GLUCAGEN DIAGNOSTIC) injection 1 mg, 1 mg, Subcutaneous, Q15 Min PRN, Tommy Linares MD  •  heparin (porcine) injection 3,600 Units, 3,600 Units, Intracatheter, PRN, Tommy Linares MD  •  hydrALAZINE (APRESOLINE) injection 10 mg, 10 mg, Intravenous, Q6H PRN, Tommy Linares MD  •  hydrocortisone-bacitracin-zinc oxide-nystatin (MAGIC BARRIER) ointment 1 application, 1 application, Topical, TID, Dea Puentes MD  •  labetalol (NORMODYNE,TRANDATE) injection 20 mg, 20 mg, Intravenous, Q6H PRN, Tommy Linares MD  •  lidocaine (LIDODERM) 5 % 1 patch, 1 patch, Transdermal, Q24H, Tommy Linares MD  •  lidocaine (LIDODERM) 5 % 1 patch, 1 patch, Transdermal, Q24H, Tommy Linares MD  •  magnesium oxide (MAG-OX) tablet 400 mg, 400 mg, Oral, Daily, Elmer Johnson MD  •  metoprolol tartrate (LOPRESSOR) tablet 50 mg, 50 mg, Oral, Q12H, Elmer Johnson MD  •  midodrine (PROAMATINE) tablet 2.5 mg, 2.5 mg, Oral, BID AC, Elmer Johnson MD  •  naloxone (NARCAN) injection 0.2 mg, 0.2 mg, Intravenous, PRN, Tommy Linares MD  •  ondansetron (ZOFRAN) injection 4 mg, 4 mg, Intravenous, Q6H PRN, Tommy Linares MD  •  ondansetron (ZOFRAN) tablet 4 mg, 4 mg, Oral, Q6H PRN, Tommy Linares MD  •  pantoprazole (PROTONIX) EC tablet 40 mg, 40 mg, Oral, Q AM, Tommy Linares MD  •  potassium chloride (MICRO-K) CR capsule 40 mEq, 40 mEq, Oral, Daily, Elmer Johnson MD  •  saccharomyces boulardii (FLORASTOR) capsule 250 mg, 250 mg, Oral, BID, Tommy Linares MD  •  sennosides-docusate (PERICOLACE) 8.6-50 MG per tablet 1 tablet, 1 tablet, Oral, BID PRN, Tommy Linares MD  •  sodium chloride 0.9 % flush 10 mL, 10 mL, Intravenous, Q12H, Tommy Linares MD  •  sodium chloride 0.9 % flush 10 mL, 10 mL, Intravenous, PRN, Tommy Linares MD  •  sodium chloride injection 40 mEq, 10 mL,  "Intravenous, Q5 Min PRN, Tommy Linares MD  •  sodium citrate 4% anticoagulant solution, 4 mL, Intracatheter, PRN, Tommy Linares MD  •  spironolactone (ALDACTONE) tablet 50 mg, 50 mg, Oral, BID, Elmer Johnson MD  •  SUMAtriptan (IMITREX) tablet 50 mg, 50 mg, Oral, Once, Tommy Linares MD  •  zinc sulfate (ZINCATE) capsule 220 mg, 220 mg, Oral, Daily, Tommy Linares MD    Data:     Code Status:    There are no questions and answers to display.       Family History   Problem Relation Age of Onset   • Colon cancer Maternal Aunt 52   • Fibromyalgia Mother    • Heart disease Mother    • Hypertension Mother    • Hodgkin's lymphoma Paternal Grandmother    • Ovarian cancer Neg Hx    • Breast cancer Neg Hx        Social History     Socioeconomic History   • Marital status:      Spouse name: Not on file   • Number of children: Not on file   • Years of education: Not on file   • Highest education level: Not on file   Tobacco Use   • Smoking status: Never Smoker   • Smokeless tobacco: Never Used   Substance and Sexual Activity   • Alcohol use: No   • Drug use: No       Vitals:  Ht 170.2 cm (67\")   Wt 95 kg (209 lb 8 oz)   LMP  (LMP Unknown)   BMI 32.81 kg/m²   T 97.2 P 92 R 22 /78 Sp02 99% (room air)    I/O (24Hr):  No intake or output data in the 24 hours ending 10/07/21 1458    Labs and imaging:      Recent Results (from the past 12 hour(s))   Basic Metabolic Panel    Collection Time: 10/07/21  4:56 AM    Specimen: Blood   Result Value Ref Range    Glucose 103 (H) 65 - 99 mg/dL    BUN 9 6 - 20 mg/dL    Creatinine 0.81 0.57 - 1.00 mg/dL    Sodium 136 136 - 145 mmol/L    Potassium 3.2 (L) 3.5 - 5.2 mmol/L    Chloride 103 98 - 107 mmol/L    CO2 21.0 (L) 22.0 - 29.0 mmol/L    Calcium 9.7 8.6 - 10.5 mg/dL    eGFR Non African Amer 77 >60 mL/min/1.73    BUN/Creatinine Ratio 11.1 7.0 - 25.0    Anion Gap 12.0 5.0 - 15.0 mmol/L   CBC Auto Differential    Collection Time: " 10/07/21  4:56 AM    Specimen: Blood   Result Value Ref Range    WBC 11.22 (H) 3.40 - 10.80 10*3/mm3    RBC 3.33 (L) 3.77 - 5.28 10*6/mm3    Hemoglobin 9.9 (L) 12.0 - 15.9 g/dL    Hematocrit 31.7 (L) 34.0 - 46.6 %    MCV 95.2 79.0 - 97.0 fL    MCH 29.7 26.6 - 33.0 pg    MCHC 31.2 (L) 31.5 - 35.7 g/dL    RDW 15.1 12.3 - 15.4 %    RDW-SD 52.3 37.0 - 54.0 fl    MPV 9.2 6.0 - 12.0 fL    Platelets 426 140 - 450 10*3/mm3    Neutrophil % 74.7 42.7 - 76.0 %    Lymphocyte % 7.7 (L) 19.6 - 45.3 %    Monocyte % 8.2 5.0 - 12.0 %    Eosinophil % 4.9 0.3 - 6.2 %    Basophil % 0.8 0.0 - 1.5 %    Immature Grans % 3.7 (H) 0.0 - 0.5 %    Neutrophils, Absolute 8.39 (H) 1.70 - 7.00 10*3/mm3    Lymphocytes, Absolute 0.86 0.70 - 3.10 10*3/mm3    Monocytes, Absolute 0.92 (H) 0.10 - 0.90 10*3/mm3    Eosinophils, Absolute 0.55 (H) 0.00 - 0.40 10*3/mm3    Basophils, Absolute 0.09 0.00 - 0.20 10*3/mm3    Immature Grans, Absolute 0.41 (H) 0.00 - 0.05 10*3/mm3    nRBC 0.0 0.0 - 0.2 /100 WBC                 Physical Examination:        Physical Exam  Vitals and nursing note reviewed.   Constitutional:       Appearance: Normal appearance.   HENT:      Head: Normocephalic.      Right Ear: External ear normal.      Left Ear: External ear normal.      Nose: Nose normal.      Mouth/Throat:      Mouth: Mucous membranes are moist.      Pharynx: Oropharynx is clear.   Eyes:      Extraocular Movements: Extraocular movements intact.      Conjunctiva/sclera: Conjunctivae normal.      Pupils: Pupils are equal, round, and reactive to light.   Cardiovascular:      Rate and Rhythm: Normal rate and regular rhythm.      Heart sounds: Murmur heard.     Pulmonary:      Effort: Pulmonary effort is normal.      Breath sounds: Normal breath sounds.   Abdominal:      General: Bowel sounds are normal. There is distension.      Palpations: Abdomen is soft.   Musculoskeletal:      Cervical back: Normal range of motion and neck supple.      Right lower leg: Edema present.       Left lower leg: Edema present.      Comments: Generalized weakness  Edema to BLE - worse to right   Skin:     General: Skin is warm and dry.   Neurological:      Mental Status: She is alert and oriented to person, place, and time.   Psychiatric:         Mood and Affect: Mood normal.         Behavior: Behavior normal.           Assessment:            * No active hospital problems. *    Past Medical History:   Diagnosis Date   • Angina at rest (CMS/HCC)    • Migraine     Sees Pain Management for injections.    • MVP (mitral valve prolapse)    • Syncope         Plan:        1. Acute RLL pulmonary embolism  2. S/p COVID 19  3. Acute renal failure - resolved  4. Rectus sheath and pelvic hematoma  5. Multifactorial anemia  6. MVP  7. HTN  8. Hypokalemia    Continue current treatment. Monitor counts. Increase activity as tolerated. Aggressive therapies.  Monitor renal function. Labs in am. Continue potassium replacement. Encourage participation with therapies. Continue off anticoagulation due to hematomas. Anticoagulation contraindicated due to large pelvic and rectus sheath hematomas. Encourage compliance with treatment. Nephrology has ordered holding lopressor and using midodrine prior to getting out of bed working with therapies. Ok for discharge to home with the assistance of family and home health with patient and family can confirm that 24 hour care will be available and that all care providers can show proficiency in meeting patient's needs at home.   >30 minutes spent at bedside in discussion with patient and  as well as additional discussion with discharge planner and facility administration.     Electronically signed by BERT Jamison on 10/7/2021 at 14:58 CDT

## 2021-10-08 LAB
ANION GAP SERPL CALCULATED.3IONS-SCNC: 13 MMOL/L (ref 5–15)
BUN SERPL-MCNC: 10 MG/DL (ref 6–20)
BUN/CREAT SERPL: 11.4 (ref 7–25)
CALCIUM SPEC-SCNC: 9.8 MG/DL (ref 8.6–10.5)
CHLORIDE SERPL-SCNC: 106 MMOL/L (ref 98–107)
CO2 SERPL-SCNC: 19 MMOL/L (ref 22–29)
CREAT SERPL-MCNC: 0.88 MG/DL (ref 0.57–1)
GFR SERPL CREATININE-BSD FRML MDRD: 70 ML/MIN/1.73
GLUCOSE SERPL-MCNC: 112 MG/DL (ref 65–99)
POTASSIUM SERPL-SCNC: 3.5 MMOL/L (ref 3.5–5.2)
SODIUM SERPL-SCNC: 138 MMOL/L (ref 136–145)

## 2021-10-08 PROCEDURE — 80048 BASIC METABOLIC PNL TOTAL CA: CPT | Performed by: INTERNAL MEDICINE

## 2021-10-08 PROCEDURE — 25010000002 ONDANSETRON PER 1 MG: Performed by: INTERNAL MEDICINE

## 2021-10-08 PROCEDURE — 97535 SELF CARE MNGMENT TRAINING: CPT

## 2021-10-08 PROCEDURE — 63710000001 ONDANSETRON PER 8 MG: Performed by: INTERNAL MEDICINE

## 2021-10-08 NOTE — PROGRESS NOTES
"[facility] Center[facility] Linares M.D[facility]  BERT Wu        Internal Medicine Progress Note    10/8/2021   11:49 CDT    Name:  Namita Zabala  MRN:    [id number]     Acct:     [id number]   Room:  [address] [place] [address] Day: 0     Admit Date: 9/24/2021  4:31 PM  PCP: David Lara MD    Subjective:     C/C: weakness    Interval History: Status:  stayed the same.  Resting in bed. No family at bedside.Afebrile.  Refused to work with therapy and has been dismissed from their service. She states that she cannot participate with therapy due to \"neurocardiogenic syncope\" and that therapy \"doesn't know what they're doing.\" States that her  is taking off work today to come pick her up. She cannot/will not confirm if she will have 24 hour assistance available at home. Abdomen remains distended. Potassium corrected - low-normal. Nursing reports that patient continues to refuse medications and other treatments at times.     Review of Systems   Constitutional: Positive for malaise/fatigue. Negative for chills, decreased appetite, weight gain and weight loss.   HENT: Negative for congestion, ear discharge, hoarse voice and tinnitus.    Eyes: Negative for blurred vision, discharge, visual disturbance and visual halos.   Cardiovascular: Negative for chest pain, claudication, dyspnea on exertion, irregular heartbeat, leg swelling, orthopnea and paroxysmal nocturnal dyspnea.   Respiratory: Negative for cough, shortness of breath, sputum production and wheezing.    Endocrine: Negative for cold intolerance, heat intolerance and polyuria.   Hematologic/Lymphatic: Negative for adenopathy. Does not bruise/bleed easily.   Skin: Negative for dry skin, itching and suspicious lesions.   Musculoskeletal: Negative for arthritis, back pain, falls, joint pain, muscle weakness and myalgias.   Gastrointestinal: Positive for nausea. Negative for abdominal pain, constipation, diarrhea, dysphagia and hematemesis.   Genitourinary: " Negative for bladder incontinence, dysuria and frequency.   Neurological: Positive for weakness. Negative for aphonia, disturbances in coordination and dizziness.   Psychiatric/Behavioral: Negative for altered mental status, depression, memory loss and substance abuse. The patient does not have insomnia and is not nervous/anxious.          Medications:     Allergies:   Allergies   Allergen Reactions   • Coconut Unknown - High Severity   • Nuts Unknown - High Severity   • Penicillins        Current Meds:   Current Facility-Administered Medications:   •  acetaminophen (TYLENOL) tablet 650 mg, 650 mg, Oral, Q6H PRN **OR** acetaminophen (TYLENOL) suppository 650 mg, 650 mg, Rectal, Q6H PRN, Tommy Linares MD  •  acetaminophen (TYLENOL) tablet 1,000 mg, 1,000 mg, Oral, Q4H PRN, Tommy Linares MD  •  ALPRAZolam (XANAX) tablet 0.25 mg, 0.25 mg, Oral, BID PRN, Tommy Linares MD  •  ascorbic acid (VITAMIN C) tablet 500 mg, 500 mg, Oral, Daily, Tommy Linares MD  •  cholecalciferol (VITAMIN D3) tablet 1,000 Units, 1,000 Units, Oral, Daily, Tommy Linares MD  •  cloNIDine (CATAPRES-TTS) 0.1 MG/24HR patch 1 patch, 1 patch, Transdermal, Weekly, Tommy Linares MD  •  dextrose (D50W) 25 g/ 50mL Intravenous Solution 25 g, 25 g, Intravenous, Q15 Min PRN, Tommy Linares MD  •  dextrose (GLUTOSE) oral gel 15 g, 15 g, Oral, Q15 Min PRN, Tommy Linares MD  •  glucagon (human recombinant) (GLUCAGEN DIAGNOSTIC) injection 1 mg, 1 mg, Subcutaneous, Q15 Min PRN, Tommy Linares MD  •  heparin (porcine) injection 3,600 Units, 3,600 Units, Intracatheter, PRN, Tommy Linares MD  •  hydrALAZINE (APRESOLINE) injection 10 mg, 10 mg, Intravenous, Q6H PRN, Tommy Linares MD  •  hydrocortisone-bacitracin-zinc oxide-nystatin (MAGIC BARRIER) ointment 1 application, 1 application, Topical, TID, Dea Puentes MD  •  labetalol (NORMODYNE,TRANDATE) injection 20 mg,  20 mg, Intravenous, Q6H PRN, Tommy Linares MD  •  lidocaine (LIDODERM) 5 % 1 patch, 1 patch, Transdermal, Q24H, Tommy Linares MD  •  lidocaine (LIDODERM) 5 % 1 patch, 1 patch, Transdermal, Q24H, Tommy Linares MD  •  magnesium oxide (MAG-OX) tablet 400 mg, 400 mg, Oral, Daily, Elmer Johnson MD  •  metoprolol tartrate (LOPRESSOR) tablet 50 mg, 50 mg, Oral, Q12H, Elmer Johnson MD  •  midodrine (PROAMATINE) tablet 2.5 mg, 2.5 mg, Oral, BID AC, Elmer Johnson MD  •  naloxone (NARCAN) injection 0.2 mg, 0.2 mg, Intravenous, PRN, Tommy Linares MD  •  ondansetron (ZOFRAN) injection 4 mg, 4 mg, Intravenous, Q6H PRN, Tommy Linares MD  •  ondansetron (ZOFRAN) tablet 4 mg, 4 mg, Oral, Q6H PRN, Tommy Linares MD  •  pantoprazole (PROTONIX) EC tablet 40 mg, 40 mg, Oral, Q AM, Tommy Linares MD  •  potassium chloride (MICRO-K) CR capsule 40 mEq, 40 mEq, Oral, Daily, Elmer Johnson MD  •  saccharomyces boulardii (FLORASTOR) capsule 250 mg, 250 mg, Oral, BID, Tommy Linares MD  •  sennosides-docusate (PERICOLACE) 8.6-50 MG per tablet 1 tablet, 1 tablet, Oral, BID PRN, Tommy Linares MD  •  sodium chloride 0.9 % flush 10 mL, 10 mL, Intravenous, Q12H, Tommy Linares MD  •  sodium chloride 0.9 % flush 10 mL, 10 mL, Intravenous, PRN, Tommy Linares MD  •  sodium chloride injection 40 mEq, 10 mL, Intravenous, Q5 Min PRN, Tommy Linares MD  •  sodium citrate 4% anticoagulant solution, 4 mL, Intracatheter, PRN, Tommy Linares MD  •  spironolactone (ALDACTONE) tablet 50 mg, 50 mg, Oral, BID, Elmer Johnson MD  •  SUMAtriptan (IMITREX) tablet 50 mg, 50 mg, Oral, Once, Tommy Linares MD  •  zinc sulfate (ZINCATE) capsule 220 mg, 220 mg, Oral, Daily, Tommy Linares MD    Data:     Code Status:    There are no questions and answers to display.       Family History  "  Problem Relation Age of Onset   • Colon cancer Maternal Aunt 52   • Fibromyalgia Mother    • Heart disease Mother    • Hypertension Mother    • Hodgkin's lymphoma Paternal Grandmother    • Ovarian cancer Neg Hx    • Breast cancer Neg Hx        Social History     Socioeconomic History   • Marital status:      Spouse name: Not on file   • Number of children: Not on file   • Years of education: Not on file   • Highest education level: Not on file   Tobacco Use   • Smoking status: Never Smoker   • Smokeless tobacco: Never Used   Substance and Sexual Activity   • Alcohol use: No   • Drug use: No       Vitals:  Ht 170.2 cm (67\")   Wt 95 kg (209 lb 8 oz)   LMP  (LMP Unknown)   BMI 32.81 kg/m²   T 97.8 P 112 R 27 /77 Sp02 100% (room air)    I/O (24Hr):  No intake or output data in the 24 hours ending 10/08/21 1149    Labs and imaging:      Recent Results (from the past 12 hour(s))   Basic Metabolic Panel    Collection Time: 10/08/21  4:51 AM    Specimen: Blood   Result Value Ref Range    Glucose 112 (H) 65 - 99 mg/dL    BUN 10 6 - 20 mg/dL    Creatinine 0.88 0.57 - 1.00 mg/dL    Sodium 138 136 - 145 mmol/L    Potassium 3.5 3.5 - 5.2 mmol/L    Chloride 106 98 - 107 mmol/L    CO2 19.0 (L) 22.0 - 29.0 mmol/L    Calcium 9.8 8.6 - 10.5 mg/dL    eGFR Non African Amer 70 >60 mL/min/1.73    BUN/Creatinine Ratio 11.4 7.0 - 25.0    Anion Gap 13.0 5.0 - 15.0 mmol/L                 Physical Examination:        Physical Exam  Vitals and nursing note reviewed.   Constitutional:       Appearance: Normal appearance.   HENT:      Head: Normocephalic.      Right Ear: External ear normal.      Left Ear: External ear normal.      Nose: Nose normal.      Mouth/Throat:      Mouth: Mucous membranes are moist.      Pharynx: Oropharynx is clear.   Eyes:      Extraocular Movements: Extraocular movements intact.      Conjunctiva/sclera: Conjunctivae normal.      Pupils: Pupils are equal, round, and reactive to light. "   Cardiovascular:      Rate and Rhythm: Normal rate and regular rhythm.      Heart sounds: Murmur heard.     Pulmonary:      Effort: Pulmonary effort is normal.      Breath sounds: Normal breath sounds.   Abdominal:      General: Bowel sounds are normal. There is distension.      Palpations: Abdomen is soft.   Musculoskeletal:      Cervical back: Normal range of motion and neck supple.      Right lower leg: Edema present.      Left lower leg: Edema present.      Comments: Generalized weakness  Edema to BLE - worse to right   Skin:     General: Skin is warm and dry.   Neurological:      Mental Status: She is alert and oriented to person, place, and time.   Psychiatric:         Mood and Affect: Mood normal.         Behavior: Behavior normal.           Assessment:            * No active hospital problems. *    Past Medical History:   Diagnosis Date   • Angina at rest (CMS/HCC)    • Migraine     Sees Pain Management for injections.    • MVP (mitral valve prolapse)    • Syncope         Plan:        1. Acute RLL pulmonary embolism  2. S/p COVID 19  3. Acute renal failure - resolved  4. Rectus sheath and pelvic hematoma  5. Multifactorial anemia  6. MVP  7. HTN  8. Hypokalemia    Continue current treatment. Monitor counts. Increase activity as tolerated. Aggressive therapies.  Monitor renal function. Labs in am. Continue potassium replacement. Encourage participation with therapies. Continue off anticoagulation due to hematomas. Anticoagulation contraindicated due to large pelvic and rectus sheath hematomas. Encourage compliance with treatment. Has been dismissed from therapy due to her refusal to participate. Ok for discharge to home with the assistance of family and home health when patient and family can confirm that 24 hour care will be available and that all care providers can show proficiency in meeting patient's needs at home.       Electronically signed by BERT Jamison on 10/8/2021 at 11:49 CDT

## 2021-10-08 NOTE — PROGRESS NOTES
Nephrology ([facility]) Progress Note      Patient:  Namita Zabala  YOB: 1975  Date of Service: 10/7/2021  MRN: [id number]   Acct: [id number]   Primary Care Physician: David Lara MD  Advance Directive:   There are no questions and answers to display.     Admit Date: 9/24/2021       Hospital Day: 0  Referring Provider: Tommy Linares MD      Patient personally seen and examined.  Complete chart including Consults, Notes, Operative Reports, Labs, Cardiology, and Radiology studies reviewed as able.        Subjective:  Namita Zabala is a 46 y.o. female for whom we were consulted for evaluation and treatment of acute kidney injury.  Patient presented on 8/27 with dyspnea, hypoxia. Diagnosed with COVID-19 pneumonia. Had a CT angiogram which showed right pulmonary embolism. Intubated on 8/27. Renal function had been steadily declining since 9/2.  Hemoglobin dropped to <6 on 9/4 and imaging revealed large retroperitoneal hematoma causing bilateral hydronephrosis and shifting of bladder. Her renal function did not improve with IV fluids. Dr Kaur placed vascath on 9/8 and patient had first dialysis the same day. Extubated on 9/10. She later had permcath placed on 9/16, femoral line removed same day. Moved to medical floor but has returned to CCU for fever, tachycardia, chest pain. Patient has an NG tube. She was stabilized and was moved back to the medical floor. On September 24, she was moved to long-term acute care hospital for further care that include rehabilitation and continuation of dialysis.  On September 29, her hemodialysis treatment has been discontinued as she had shown good renal recovery.  On October 1, her permacath was removed.    Today, no overnight events.  Patient notes continued weakness.  Her potassium level had improved now down today.  Denied current chest pain, shortness of air at rest, nausea or vomiting.    Temp-  97.2  Pulse- 92  Resp- 22  BP- 112/78  O2%- 99      Allergies:  Coconut, Nuts, and Penicillins    Home Meds:  Medications Prior to Admission   Medication Sig Dispense Refill Last Dose   • acetaminophen (TYLENOL) 325 MG tablet Take 2 tablets by mouth Every 6 (Six) Hours As Needed for Mild Pain , Fever or Headache (fever greater than 101.5 F or headache).      • [] ALPRAZolam (XANAX) 0.25 MG tablet Take 1 tablet by mouth 2 (Two) Times a Day As Needed for Anxiety for up to 5 days.      • bisacodyl (DULCOLAX) 10 MG suppository Insert 1 suppository into the rectum Daily.      • cloNIDine (CATAPRES-TTS) 0.1 MG/24HR patch Place 1 patch on the skin as directed by provider 1 (One) Time Per Week.      • dextrose (D50W) 50 % solution Infuse 50 mL into a venous catheter Every 15 (Fifteen) Minutes As Needed for Low Blood Sugar (Blood Sugar Less Than 70).      • dextrose (GLUTOSE) 40 % gel Take 15 g by mouth Every 15 (Fifteen) Minutes As Needed for Low Blood Sugar (Blood sugar less than 70).      • epoetin mindy-epbx (RETACRIT) 65609 UNIT/ML injection Inject 1 mL under the skin into the appropriate area as directed 3 (Three) Times a Week. Indications: ESRD on Dialysis 6.6 mL     • [] fluconazole (DIFLUCAN) 200-0.9 MG/100ML-% IVPB Infuse 100 mL into a venous catheter Daily for 10 doses. Indications: Urinary Tract Infection 1000 mL 0    • glucagon, human recombinant, (GLUCAGEN DIAGNOSTIC) 1 MG injection Inject 1 mg under the skin into the appropriate area as directed Every 15 (Fifteen) Minutes As Needed (Blood Glucose Less Than 70) for up to 360 days.      • Heparin Sodium, Porcine, (heparin, porcine,) 1000 UNIT/ML injection 3.6 mL by Intracatheter route As Needed (after dialysis). Indications: Other - full anticoagulation      • insulin regular (humuLIN R,novoLIN R) 100 UNIT/ML injection Inject 0-9 Units under the skin into the appropriate area as directed Every 6 (Six) Hours.  12    • lidocaine (LIDODERM) 5 % Place 1  patch on the skin as directed by provider Daily. Remove & Discard patch within 12 hours or as directed by MD      • lidocaine (LIDODERM) 5 % Place 1 patch on the skin as directed by provider Daily. Remove & Discard patch within 12 hours or as directed by MD      • metoprolol tartrate (LOPRESSOR) 25 MG tablet Take 1 tablet by mouth Every 12 (Twelve) Hours.      • ondansetron (ZOFRAN) 4 MG/2ML injection Infuse 2 mL into a venous catheter Every 6 (Six) Hours As Needed for Nausea or Vomiting.      • pantoprazole (PROTONIX) 40 MG EC tablet Take 1 tablet by mouth Every Morning.      • sennosides-docusate (PERICOLACE) 8.6-50 MG per tablet Take 1 tablet by mouth 2 (Two) Times a Day.          Medicines:  Current Facility-Administered Medications   Medication Dose Route Frequency Provider Last Rate Last Admin   • acetaminophen (TYLENOL) tablet 650 mg  650 mg Oral Q6H PRN Tommy Linares MD        Or   • acetaminophen (TYLENOL) suppository 650 mg  650 mg Rectal Q6H PRN Tommy Linares MD       • acetaminophen (TYLENOL) tablet 1,000 mg  1,000 mg Oral Q4H PRN Tommy Linares MD       • ALPRAZolam (XANAX) tablet 0.25 mg  0.25 mg Oral BID PRN Tommy Linares MD       • ascorbic acid (VITAMIN C) tablet 500 mg  500 mg Oral Daily Tommy Linares MD       • cholecalciferol (VITAMIN D3) tablet 1,000 Units  1,000 Units Oral Daily Tommy Linares MD       • cloNIDine (CATAPRES-TTS) 0.1 MG/24HR patch 1 patch  1 patch Transdermal Weekly Tommy Linares MD       • dextrose (D50W) 25 g/ 50mL Intravenous Solution 25 g  25 g Intravenous Q15 Min PRN Tommy Linares MD       • dextrose (GLUTOSE) oral gel 15 g  15 g Oral Q15 Min PRN Tommy Linares MD       • glucagon (human recombinant) (GLUCAGEN DIAGNOSTIC) injection 1 mg  1 mg Subcutaneous Q15 Min PRN Tommy Linares MD       • heparin (porcine) injection 3,600 Units  3,600 Units Intracatheter PRN Tommy Linares MD        • hydrALAZINE (APRESOLINE) injection 10 mg  10 mg Intravenous Q6H PRN Tommy Linares MD       • hydrocortisone-bacitracin-zinc oxide-nystatin (MAGIC BARRIER) ointment 1 application  1 application Topical TID Dea Puentes MD       • labetalol (NORMODYNE,TRANDATE) injection 20 mg  20 mg Intravenous Q6H PRN Tommy Linares MD       • lidocaine (LIDODERM) 5 % 1 patch  1 patch Transdermal Q24H Tommy Linares MD       • lidocaine (LIDODERM) 5 % 1 patch  1 patch Transdermal Q24H Tommy Linares MD       • magnesium oxide (MAG-OX) tablet 400 mg  400 mg Oral Daily Elmer Johnson MD       • metoprolol tartrate (LOPRESSOR) tablet 50 mg  50 mg Oral Q12H Elmer Johnson MD       • midodrine (PROAMATINE) tablet 2.5 mg  2.5 mg Oral BID AC Elmer Johnson MD       • naloxone (NARCAN) injection 0.2 mg  0.2 mg Intravenous PRN Tommy Linares MD       • ondansetron (ZOFRAN) injection 4 mg  4 mg Intravenous Q6H PRN Tommy Linares MD       • ondansetron (ZOFRAN) tablet 4 mg  4 mg Oral Q6H PRN Tommy Linares MD       • pantoprazole (PROTONIX) EC tablet 40 mg  40 mg Oral Q AM Tommy Linares MD       • potassium chloride (MICRO-K) CR capsule 40 mEq  40 mEq Oral Daily Elmer Johnson MD       • saccharomyces boulardii (FLORASTOR) capsule 250 mg  250 mg Oral BID Tommy Linares MD       • sennosides-docusate (PERICOLACE) 8.6-50 MG per tablet 1 tablet  1 tablet Oral BID PRN Tommy Linares MD       • sodium chloride 0.9 % flush 10 mL  10 mL Intravenous Q12H Tommy Linares MD       • sodium chloride 0.9 % flush 10 mL  10 mL Intravenous PRN Tommy Linares MD       • sodium chloride injection 40 mEq  10 mL Intravenous Q5 Min PRN Tommy Linares MD       • sodium citrate 4% anticoagulant solution  4 mL Intracatheter PRN Tommy Linares MD       • spironolactone (ALDACTONE) tablet 50 mg  50 mg Oral  BID Elmer Johnson MD       • SUMAtriptan (IMITREX) tablet 50 mg  50 mg Oral Once Tommy Linares MD       • zinc sulfate (ZINCATE) capsule 220 mg  220 mg Oral Daily Tommy Linares MD           Past Medical History:  Past Medical History:   Diagnosis Date   • Angina at rest (CMS/HCC)    • Migraine     Sees Pain Management for injections.    • MVP (mitral valve prolapse)    • Syncope        Past Surgical History:  Past Surgical History:   Procedure Laterality Date   • BREAST BIOPSY Right 2011    benign   • INSERTION HEMODIALYSIS CATHETER Left 9/16/2021    Procedure: HEMODIALYSIS CATHETER INSERTION;  Surgeon: Anders Kaur MD;  Location: [facility] OR ;  Service: Vascular;  Laterality: Left;   • TONSILLECTOMY         Family History  Family History   Problem Relation Age of Onset   • Colon cancer Maternal Aunt 52   • Fibromyalgia Mother    • Heart disease Mother    • Hypertension Mother    • Hodgkin's lymphoma Paternal Grandmother    • Ovarian cancer Neg Hx    • Breast cancer Neg Hx        Social History  Social History     Socioeconomic History   • Marital status:      Spouse name: Not on file   • Number of children: Not on file   • Years of education: Not on file   • Highest education level: Not on file   Tobacco Use   • Smoking status: Never Smoker   • Smokeless tobacco: Never Used   Substance and Sexual Activity   • Alcohol use: No   • Drug use: No         Review of Systems:  History obtained from chart review and the patient  General ROS: No fever or chills  Respiratory ROS: No cough, shortness of breath, wheezing  Cardiovascular ROS: No chest pain or palpitations  Gastrointestinal ROS: No abdominal pain or melena  Genito-Urinary ROS: No dysuria or hematuria  Psych ROS: No anxiety and depression    Objective:  No data found.  No intake or output data in the 24 hours ending 10/07/21 2134  General: awake/alert   Chest:  clear to auscultation bilaterally without  respiratory distress  CVS: regular rate and rhythm  Abdominal: soft, nontender, positive bowel sounds  Extremities: ble edema  Skin: warm and dry without rash      Labs:  Results from last 7 days   Lab Units 10/07/21  0456 10/04/21  0445 10/02/21  0447   WBC 10*3/mm3 11.22* 10.81* 9.29   HEMOGLOBIN g/dL 9.9* 9.7* 9.3*   HEMATOCRIT % 31.7* 31.2* 29.5*   PLATELETS 10*3/mm3 426 439 406         Results from last 7 days   Lab Units 10/07/21  0456 10/06/21  0427 10/05/21  1901 10/05/21  0416 10/05/21  0416 10/04/21  0445 10/04/21  0445   SODIUM mmol/L 136 136  --   --  138   < > 140   POTASSIUM mmol/L 3.2* 3.8 2.9*   < > 2.6*   < > 3.0*   CHLORIDE mmol/L 103 105  --   --  104   < > 105   CO2 mmol/L 21.0* 21.0*  --   --  22.0   < > 22.0   BUN mg/dL 9 8  --   --  10   < > 13   CREATININE mg/dL 0.81 0.71  --   --  0.77   < > 0.90   CALCIUM mg/dL 9.7 9.3  --   --  9.2   < > 9.7   BILIRUBIN mg/dL  --   --   --   --   --   --  0.6   ALK PHOS U/L  --   --   --   --   --   --  91   ALT (SGPT) U/L  --   --   --   --   --   --  20   AST (SGOT) U/L  --   --   --   --   --   --  23   GLUCOSE mg/dL 103* 98  --   --  99   < > 101*    < > = values in this interval not displayed.       Radiology:   Imaging Results (Last 72 Hours)     ** No results found for the last 72 hours. **          Culture:  No results found for: BLOODCX, URINECX, WOUNDCX, MRSACX, RESPCX, STOOLCX      Assessment   Acute kidney injury stage III-now recovered and off dialysis  Obstructive uropathy  Left pelvic hematoma  COVID-19 pneumonia with acute hypoxemic respiratory failure-improved  Pulmonary embolism  Metabolic acidosis  Hypokalemia    Plan:  Diuretics adjusted, continue to monitor labs daily given variable potassium levels  No further need for dialysis anticipated  Encourage therapy  Replace K today IV as pt refused oral meds    Brody Chow MD  10/7/2021  21:34 CDT

## 2021-10-08 NOTE — PROGRESS NOTES
"Adult Nutrition  Assessment/PES    Patient Name:  Namita Zabala  YOB: 1975  MRN: [id number]  Admit Date:  9/24/2021    Assessment Date:  10/8/2021    Comments: Pt drinking Slim Fast, but does not want to attempt solid foods. Pt has not met PO intake goals, and pt does not want AMONS. Will defer to MD if aggressive interventions desired. Will continue to follow.    Reason for Assessment     Row Name 10/08/21 1508          Reason for Assessment    Reason For Assessment  follow-up protocol     Diagnosis  pulmonary disease;infection/sepsis;renal disease;hematological/related complications;gastrointestinal disease     Identified At Risk by Screening Criteria  reduced oral intake over the last month         Nutrition/Diet History     Row Name 10/08/21 1509          Nutrition/Diet History    Typical Food/Fluid Intake  Pt does not want to advance past full liquid diet. Per RN, refusing medications. Encouraging PO; pt continues to drink Slim Fast. Will continue to monitor.     Food Allergies  peanut/tree nut;other (see comments) coconut     Factors Affecting Nutritional Intake  altered gastrointestinal function;nausea         Anthropometrics     Row Name 10/08/21 1511 10/08/21 1510       Anthropometrics    Height  170.2 cm (67\")  170.2 cm (67\")       Ideal Body Weight (IBW)    Ideal Body Weight (IBW) (kg)  61.86  61.86    Row Name 10/08/21 1509          Anthropometrics    Height  170.2 cm (67\")     Weight  95 kg (209 lb 8 oz)        Ideal Body Weight (IBW)    Ideal Body Weight (IBW) (kg)  61.86     % Ideal Body Weight  153.61        Body Mass Index (BMI)    BMI (kg/m2)  32.88     BMI Assessment  BMI 30-34.9: obesity grade I         Labs/Tests/Procedures/Meds     Row Name 10/08/21 1509          Labs/Procedures/Meds    Lab Results Comments  K+ WNL at this time        Diagnostic Tests/Procedures    Diagnostic Test/Procedure Reviewed  reviewed, pertinent        Medications    Pertinent Medications Reviewed " " reviewed, pertinent     Pertinent Medications Comments  See MAR         Physical Findings     Row Name 10/08/21 1510          Physical Findings    Overall Physical Appearance  generalized wasting     Gastrointestinal  -- BM x 6 10/7     Skin  edema;pressure injury         Estimated/Assessed Needs     Row Name 10/08/21 1511 10/08/21 1510       Calculation Measurements    Weight Used For Calculations  61.2 kg (135 lb) IBW  69.6 kg (153 lb 8 oz)    Height  170.2 cm (67\")  170.2 cm (67\")       Estimated/Assessed Needs    Additional Documentation  --  Fluid Requirements (Group);[place]-St. Jeor Equation (Group);Protein Requirements (Group)       Calorie Requirements    Weight Used For Calorie Calculations  --  69.6 kg (153 lb 8 oz)       KCAL/KG    KCAL/KG  --  25 Kcal/Kg (kcal);30 Kcal/Kg (kcal)    25 Kcal/Kg (kcal)  1530.9  1740.675    30 Kcal/Kg (kcal)  1837.08  2088.81    kcal/kg (Specify)  --  22 1532kcal/day       Mac[place]-St. Jeor Equation    RMR (Macomb-St. Jeor Equation)  1294.985  1378.895       Protein Requirements    Weight Used For Protein Calculations  --  61.2 kg (135 lb) IBW    Est Protein Requirement Amount (gms/kg)  --  1.2 gm protein    Estimated Protein Requirements (gms/day)  --  73.48       Fluid Requirements    Fluid Requirements (mL/day)  --  1838    Estimated Fluid Requirement Method  --  RDA Method    RDA Method (mL)  --  1838    Row Name 10/08/21 1509          Calculation Measurements    Height  170.2 cm (67\")         Nutrition Prescription Ordered     Row Name 10/08/21 1511          Nutrition Prescription PO    Current PO Diet  Full Liquid         Evaluation of Received Nutrient/Fluid Intake     Row Name 10/08/21 1511          Calculation Measurements    Weight Used For Calculations  61.2 kg (135 lb) IBW     Height  170.2 cm (67\")        Nutrient/Fluid Evaluation    Number of Days Evaluated  2 days        Intake Assessment    Energy/Calorie Requirement Assessment  meeting needs        PO " Evaluation    Number of Days PO Intake Evaluated  2 days     Number of Meals  2     % PO Intake  5%; Refusing PO               Problem/Interventions:  Problem 1     Row Name 10/08/21 1512          Nutrition Diagnoses Problem 1    Problem 1  Needs Alternate TF currently on hold d/t ileus     Etiology (related to)  Medical Diagnosis     Gastrointestinal  Nausea     Signs/Symptoms (evidenced by)  PO Intake full liquid diet; pt does not want to advance to regular food     Percent (%) intake recorded  5 %     Over number of meals  2           Problem 3     Row Name 10/08/21 1513          Nutrition Diagnoses Problem 3    Problem 3  Predicted Suboptimal Intake     Etiology (related to)  Medical Diagnosis     Nutrition related  Increased nutrition needs     Hematological  Anemia     Pulmonary/Critical Care  Acute respiratory failure;Pulmonary emboli;Other (comment) s/p COVID, mass pressing on bladder     Renal  Hemodialysis     Signs/Symptoms (evidenced by)  Clear Lquid Diet     Reported/Observed By  Patient     Appetite  Poor     Reported GI Symptoms  Nausea and vomiting No vomiting           Intervention Goal     Row Name 10/08/21 1513          Intervention Goal    General  Reduce/improve symptoms;Meet nutritional needs for age/condition     PO  Establish PO;Tolerate PO;Advance diet     Weight  No significant weight loss         Nutrition Intervention     Row Name 10/08/21 1513          Nutrition Intervention    RD/Tech Action  Care plan reviewd;Follow Tx progress;Supplement offered/refused         Nutrition Prescription     Row Name 10/08/21 1513          Nutrition Prescription PO    PO Prescription  Other (comment) Advance as pt tolerates or requests         Education/Evaluation     Row Name 10/08/21 1513          Education    Education  No discharge needs identified at this time        Monitor/Evaluation    Monitor  Per protocol           Electronically signed by:  Morelia Warner RD  10/08/21 15:14 CDT

## 2021-10-09 ENCOUNTER — APPOINTMENT (OUTPATIENT)
Dept: GENERAL RADIOLOGY | Facility: HOSPITAL | Age: 46
End: 2021-10-09

## 2021-10-09 PROCEDURE — 25010000002 ONDANSETRON PER 1 MG: Performed by: INTERNAL MEDICINE

## 2021-10-09 PROCEDURE — 74018 RADEX ABDOMEN 1 VIEW: CPT

## 2021-10-09 RX ORDER — SIMETHICONE 80 MG
80 TABLET,CHEWABLE ORAL 4 TIMES DAILY PRN
Status: DISCONTINUED | OUTPATIENT
Start: 2021-10-09 | End: 2021-10-12 | Stop reason: HOSPADM

## 2021-10-09 NOTE — PROGRESS NOTES
"[facility]  BERT Wu        Internal Medicine Progress Note    10/9/2021   16:34 CDT    Name:  Namita Zabala  MRN:    [id number]     Acct:     [id number]   Room:  [address] [place] [address] Day: 0     Admit Date: 9/24/2021  4:31 PM  PCP: David Lara MD    Subjective:     C/C: weakness    Interval History: Status:  stayed the same.  Resting in bed. No family at bedside.Afebrile.  Refused to work with therapy and has been dismissed from their service. She states that she cannot participate with therapy due to \"neurocardiogenic syncope\" and that therapy \"doesn't know what they're doing.\" States that her  is taking off work today to come pick her up. She cannot/will not confirm if she will have 24 hour assistance available at home. Abdomen remains distended. Potassium corrected - low-normal. Nursing reports that patient continues to refuse medications and other treatments at times. Abdomen appears distended, complains of gas and abdominal pain, will not let RN and tech turn her or clean her up.      Review of Systems   Constitutional: Positive for malaise/fatigue. Negative for chills, decreased appetite, weight gain and weight loss.   HENT: Negative for congestion, ear discharge, hoarse voice and tinnitus.    Eyes: Negative for blurred vision, discharge, visual disturbance and visual halos.   Cardiovascular: Negative for chest pain, claudication, dyspnea on exertion, irregular heartbeat, leg swelling, orthopnea and paroxysmal nocturnal dyspnea.   Respiratory: Negative for cough, shortness of breath, sputum production and wheezing.    Endocrine: Negative for cold intolerance, heat intolerance and polyuria.   Hematologic/Lymphatic: Negative for adenopathy. Does not bruise/bleed easily.   Skin: Negative for dry skin, itching and suspicious lesions.   Musculoskeletal: Negative for arthritis, back pain, falls, joint pain, muscle weakness and myalgias.   Gastrointestinal: " Positive for nausea. Negative for abdominal pain, constipation, diarrhea, dysphagia and hematemesis.   Genitourinary: Negative for bladder incontinence, dysuria and frequency.   Neurological: Positive for weakness. Negative for aphonia, disturbances in coordination and dizziness.   Psychiatric/Behavioral: Negative for altered mental status, depression, memory loss and substance abuse. The patient does not have insomnia and is not nervous/anxious.          Medications:     Allergies:   Allergies   Allergen Reactions   • Coconut Unknown - High Severity   • Nuts Unknown - High Severity   • Penicillins        Current Meds:   Current Facility-Administered Medications:   •  acetaminophen (TYLENOL) tablet 650 mg, 650 mg, Oral, Q6H PRN **OR** acetaminophen (TYLENOL) suppository 650 mg, 650 mg, Rectal, Q6H PRN, Tommy Linares MD  •  acetaminophen (TYLENOL) tablet 1,000 mg, 1,000 mg, Oral, Q4H PRN, Tommy Linares MD  •  ALPRAZolam (XANAX) tablet 0.25 mg, 0.25 mg, Oral, BID PRN, Tommy Linares MD  •  ascorbic acid (VITAMIN C) tablet 500 mg, 500 mg, Oral, Daily, Tommy Linares MD  •  cholecalciferol (VITAMIN D3) tablet 1,000 Units, 1,000 Units, Oral, Daily, Tommy Linares MD  •  cloNIDine (CATAPRES-TTS) 0.1 MG/24HR patch 1 patch, 1 patch, Transdermal, Weekly, Tommy Linares MD  •  dextrose (D50W) 25 g/ 50mL Intravenous Solution 25 g, 25 g, Intravenous, Q15 Min PRN, Tommy Linares MD  •  dextrose (GLUTOSE) oral gel 15 g, 15 g, Oral, Q15 Min PRN, Tommy Linares MD  •  glucagon (human recombinant) (GLUCAGEN DIAGNOSTIC) injection 1 mg, 1 mg, Subcutaneous, Q15 Min PRN, Tommy Linares MD  •  heparin (porcine) injection 3,600 Units, 3,600 Units, Intracatheter, PRN, Tommy Linares MD  •  hydrALAZINE (APRESOLINE) injection 10 mg, 10 mg, Intravenous, Q6H PRN, Tommy Linares MD  •  hydrocortisone-bacitracin-zinc oxide-nystatin (MAGIC BARRIER) ointment  1 application, 1 application, Topical, TID, Dea Puentes MD  •  labetalol (NORMODYNE,TRANDATE) injection 20 mg, 20 mg, Intravenous, Q6H PRN, Tommy Linares MD  •  lidocaine (LIDODERM) 5 % 1 patch, 1 patch, Transdermal, Q24H, Tommy Linares MD  •  lidocaine (LIDODERM) 5 % 1 patch, 1 patch, Transdermal, Q24H, Tommy Linares MD  •  magnesium oxide (MAG-OX) tablet 400 mg, 400 mg, Oral, Daily, Elmer Johnson MD  •  metoprolol tartrate (LOPRESSOR) tablet 50 mg, 50 mg, Oral, Q12H, Elmer Johnson MD  •  midodrine (PROAMATINE) tablet 2.5 mg, 2.5 mg, Oral, BID AC, Elmer Johnson MD  •  naloxone (NARCAN) injection 0.2 mg, 0.2 mg, Intravenous, PRN, Tommy Linares MD  •  ondansetron (ZOFRAN) injection 4 mg, 4 mg, Intravenous, Q6H PRN, Tommy Linares MD  •  ondansetron (ZOFRAN) tablet 4 mg, 4 mg, Oral, Q6H PRN, Tommy Linares MD  •  pantoprazole (PROTONIX) EC tablet 40 mg, 40 mg, Oral, Q AM, Tommy Linares MD  •  potassium chloride (MICRO-K) CR capsule 40 mEq, 40 mEq, Oral, Daily, Elmer Johnson MD  •  saccharomyces boulardii (FLORASTOR) capsule 250 mg, 250 mg, Oral, BID, Tommy Linares MD  •  sennosides-docusate (PERICOLACE) 8.6-50 MG per tablet 1 tablet, 1 tablet, Oral, BID PRN, Tommy Linares MD  •  sodium chloride 0.9 % flush 10 mL, 10 mL, Intravenous, Q12H, Tommy Linares MD  •  sodium chloride 0.9 % flush 10 mL, 10 mL, Intravenous, PRN, Tommy Linares MD  •  sodium chloride injection 40 mEq, 10 mL, Intravenous, Q5 Min PRN, Tommy Linares MD  •  sodium citrate 4% anticoagulant solution, 4 mL, Intracatheter, PRN, Tommy Linares MD  •  spironolactone (ALDACTONE) tablet 50 mg, 50 mg, Oral, BID, Elmer Johnson MD  •  SUMAtriptan (IMITREX) tablet 50 mg, 50 mg, Oral, Once, Tommy Linares MD  •  zinc sulfate (ZINCATE) capsule 220 mg, 220 mg, Oral, Daily, Vijay  "Tommy Dyer MD    Data:     Code Status:    There are no questions and answers to display.       Family History   Problem Relation Age of Onset   • Colon cancer Maternal Aunt 52   • Fibromyalgia Mother    • Heart disease Mother    • Hypertension Mother    • Hodgkin's lymphoma Paternal Grandmother    • Ovarian cancer Neg Hx    • Breast cancer Neg Hx        Social History     Socioeconomic History   • Marital status:    Tobacco Use   • Smoking status: Never Smoker   • Smokeless tobacco: Never Used   Substance and Sexual Activity   • Alcohol use: No   • Drug use: No       Vitals:  Ht 170.2 cm (67\")   Wt 95 kg (209 lb 8 oz)   LMP  (LMP Unknown)   BMI 32.81 kg/m²   T 98.9, P 112 R 27 /87 Sp02 97% (room air)    I/O (24Hr):  No intake or output data in the 24 hours ending 10/09/21 1634    Labs and imaging:      No results found for this or any previous visit (from the past 12 hour(s)).              Physical Examination:        Physical Exam  Vitals and nursing note reviewed.   Constitutional:       Appearance: Normal appearance.   HENT:      Head: Normocephalic.      Right Ear: External ear normal.      Left Ear: External ear normal.      Nose: Nose normal.      Mouth/Throat:      Mouth: Mucous membranes are moist.      Pharynx: Oropharynx is clear.   Eyes:      Extraocular Movements: Extraocular movements intact.      Conjunctiva/sclera: Conjunctivae normal.      Pupils: Pupils are equal, round, and reactive to light.   Cardiovascular:      Rate and Rhythm: Normal rate and regular rhythm.      Heart sounds: Murmur heard.       Pulmonary:      Effort: Pulmonary effort is normal.      Breath sounds: Normal breath sounds.   Abdominal:      General: Bowel sounds are normal. There is distension.      Palpations: Abdomen is soft.   Musculoskeletal:      Cervical back: Normal range of motion and neck supple.      Right lower leg: Edema present.      Left lower leg: Edema present.      Comments: Generalized " weakness  Edema to BLE - worse to right   Skin:     General: Skin is warm and dry.   Neurological:      Mental Status: She is alert and oriented to person, place, and time.   Psychiatric:         Mood and Affect: Mood normal.         Behavior: Behavior normal.           Assessment:            * No active hospital problems. *    Past Medical History:   Diagnosis Date   • Angina at rest (CMS/HCC)    • Migraine     Sees Pain Management for injections.    • MVP (mitral valve prolapse)    • Syncope         Plan:        1. Acute RLL pulmonary embolism  2. S/p COVID 19  3. Acute renal failure - resolved  4. Rectus sheath and pelvic hematoma  5. Multifactorial anemia  6. MVP  7. HTN  8. Hypokalemia    Continue current treatment. Monitor counts. Increase activity as tolerated. Aggressive therapies.  Monitor renal function.  Encourage participation with therapies. Continue off anticoagulation due to hematomas. Anticoagulation contraindicated due to large pelvic and rectus sheath hematomas. Encourage compliance with treatment. Has been dismissed from therapy due to her refusal to participate. Ok for discharge to home with the assistance of family and home health when patient and family can confirm that 24 hour care will be available and that all care providers can show proficiency in meeting patient's needs at home.       Electronically signed by BERT Olivia on 10/9/2021 at 16:34 CDT

## 2021-10-09 NOTE — PROGRESS NOTES
Nephrology ([facility]) Progress Note      Patient:  Namita Zabala  YOB: 1975  Date of Service: 10/8/2021  MRN: [id number]   Acct: [id number]   Primary Care Physician: David Lara MD  Advance Directive:   There are no questions and answers to display.     Admit Date: 9/24/2021       Hospital Day: 0  Referring Provider: Tommy Linares MD      Patient personally seen and examined.  Complete chart including Consults, Notes, Operative Reports, Labs, Cardiology, and Radiology studies reviewed as able.        Subjective:  Namita Zabala is a 46 y.o. female for whom we were consulted for evaluation and treatment of acute kidney injury.  Patient presented on 8/27 with dyspnea, hypoxia. Diagnosed with COVID-19 pneumonia. Had a CT angiogram which showed right pulmonary embolism. Intubated on 8/27. Renal function had been steadily declining since 9/2.  Hemoglobin dropped to <6 on 9/4 and imaging revealed large retroperitoneal hematoma causing bilateral hydronephrosis and shifting of bladder. Her renal function did not improve with IV fluids. Dr Kaur placed vascath on 9/8 and patient had first dialysis the same day. Extubated on 9/10. She later had permcath placed on 9/16, femoral line removed same day. Moved to medical floor but has returned to CCU for fever, tachycardia, chest pain. Patient has an NG tube. She was stabilized and was moved back to the medical floor. On September 24, she was moved to long-term acute care hospital for further care that include rehabilitation and continuation of dialysis.  On September 29, her hemodialysis treatment has been discontinued as she had shown good renal recovery.  On October 1, her permacath was removed.    Today, no overnight events.  Patient notes continued weakness.  Her potassium level had improved with replacement.  Denied current chest pain, shortness of air at rest, nausea or vomiting.  Hoping for home  therapy    Temp- 97.8  Pulse- 112  Resp- 27  BP- 123/77  O2%- 100      Allergies:  Coconut, Nuts, and Penicillins    Home Meds:  Medications Prior to Admission   Medication Sig Dispense Refill Last Dose   • acetaminophen (TYLENOL) 325 MG tablet Take 2 tablets by mouth Every 6 (Six) Hours As Needed for Mild Pain , Fever or Headache (fever greater than 101.5 F or headache).      • [] ALPRAZolam (XANAX) 0.25 MG tablet Take 1 tablet by mouth 2 (Two) Times a Day As Needed for Anxiety for up to 5 days.      • bisacodyl (DULCOLAX) 10 MG suppository Insert 1 suppository into the rectum Daily.      • cloNIDine (CATAPRES-TTS) 0.1 MG/24HR patch Place 1 patch on the skin as directed by provider 1 (One) Time Per Week.      • dextrose (D50W) 50 % solution Infuse 50 mL into a venous catheter Every 15 (Fifteen) Minutes As Needed for Low Blood Sugar (Blood Sugar Less Than 70).      • dextrose (GLUTOSE) 40 % gel Take 15 g by mouth Every 15 (Fifteen) Minutes As Needed for Low Blood Sugar (Blood sugar less than 70).      • epoetin mindy-epbx (RETACRIT) 04199 UNIT/ML injection Inject 1 mL under the skin into the appropriate area as directed 3 (Three) Times a Week. Indications: ESRD on Dialysis 6.6 mL     • [] fluconazole (DIFLUCAN) 200-0.9 MG/100ML-% IVPB Infuse 100 mL into a venous catheter Daily for 10 doses. Indications: Urinary Tract Infection 1000 mL 0    • glucagon, human recombinant, (GLUCAGEN DIAGNOSTIC) 1 MG injection Inject 1 mg under the skin into the appropriate area as directed Every 15 (Fifteen) Minutes As Needed (Blood Glucose Less Than 70) for up to 360 days.      • Heparin Sodium, Porcine, (heparin, porcine,) 1000 UNIT/ML injection 3.6 mL by Intracatheter route As Needed (after dialysis). Indications: Other - full anticoagulation      • insulin regular (humuLIN R,novoLIN R) 100 UNIT/ML injection Inject 0-9 Units under the skin into the appropriate area as directed Every 6 (Six) Hours.  12    • lidocaine  (LIDODERM) 5 % Place 1 patch on the skin as directed by provider Daily. Remove & Discard patch within 12 hours or as directed by MD      • lidocaine (LIDODERM) 5 % Place 1 patch on the skin as directed by provider Daily. Remove & Discard patch within 12 hours or as directed by MD      • metoprolol tartrate (LOPRESSOR) 25 MG tablet Take 1 tablet by mouth Every 12 (Twelve) Hours.      • ondansetron (ZOFRAN) 4 MG/2ML injection Infuse 2 mL into a venous catheter Every 6 (Six) Hours As Needed for Nausea or Vomiting.      • pantoprazole (PROTONIX) 40 MG EC tablet Take 1 tablet by mouth Every Morning.      • sennosides-docusate (PERICOLACE) 8.6-50 MG per tablet Take 1 tablet by mouth 2 (Two) Times a Day.          Medicines:  Current Facility-Administered Medications   Medication Dose Route Frequency Provider Last Rate Last Admin   • acetaminophen (TYLENOL) tablet 650 mg  650 mg Oral Q6H PRN Tommy Linares MD        Or   • acetaminophen (TYLENOL) suppository 650 mg  650 mg Rectal Q6H PRN Tommy Linares MD       • acetaminophen (TYLENOL) tablet 1,000 mg  1,000 mg Oral Q4H PRN Tommy Linares MD       • ALPRAZolam (XANAX) tablet 0.25 mg  0.25 mg Oral BID PRN Tommy Linares MD       • ascorbic acid (VITAMIN C) tablet 500 mg  500 mg Oral Daily Tommy Linares MD       • cholecalciferol (VITAMIN D3) tablet 1,000 Units  1,000 Units Oral Daily Tommy Linares MD       • cloNIDine (CATAPRES-TTS) 0.1 MG/24HR patch 1 patch  1 patch Transdermal Weekly Tommy Linares MD       • dextrose (D50W) 25 g/ 50mL Intravenous Solution 25 g  25 g Intravenous Q15 Min PRN Tommy Linares MD       • dextrose (GLUTOSE) oral gel 15 g  15 g Oral Q15 Min PRN Tommy Linares MD       • glucagon (human recombinant) (GLUCAGEN DIAGNOSTIC) injection 1 mg  1 mg Subcutaneous Q15 Min PRN Tommy Linares MD       • heparin (porcine) injection 3,600 Units  3,600 Units Intracatheter PRN  Tommy Linares MD       • hydrALAZINE (APRESOLINE) injection 10 mg  10 mg Intravenous Q6H PRN Tommy Linares MD       • hydrocortisone-bacitracin-zinc oxide-nystatin (MAGIC BARRIER) ointment 1 application  1 application Topical TID Dea Puentes MD       • labetalol (NORMODYNE,TRANDATE) injection 20 mg  20 mg Intravenous Q6H PRN Tommy Linares MD       • lidocaine (LIDODERM) 5 % 1 patch  1 patch Transdermal Q24H Tommy Linares MD       • lidocaine (LIDODERM) 5 % 1 patch  1 patch Transdermal Q24H Tommy Linares MD       • magnesium oxide (MAG-OX) tablet 400 mg  400 mg Oral Daily Elmer Johnson MD       • metoprolol tartrate (LOPRESSOR) tablet 50 mg  50 mg Oral Q12H Elmer Johnson MD       • midodrine (PROAMATINE) tablet 2.5 mg  2.5 mg Oral BID AC Elmer Johnson MD       • naloxone (NARCAN) injection 0.2 mg  0.2 mg Intravenous PRN Tommy Linares MD       • ondansetron (ZOFRAN) injection 4 mg  4 mg Intravenous Q6H PRN Tommy Linares MD       • ondansetron (ZOFRAN) tablet 4 mg  4 mg Oral Q6H PRN Tommy Linares MD       • pantoprazole (PROTONIX) EC tablet 40 mg  40 mg Oral Q AM Tommy Linares MD       • potassium chloride (MICRO-K) CR capsule 40 mEq  40 mEq Oral Daily Elmer Johnson MD       • saccharomyces boulardii (FLORASTOR) capsule 250 mg  250 mg Oral BID Tommy Linares MD       • sennosides-docusate (PERICOLACE) 8.6-50 MG per tablet 1 tablet  1 tablet Oral BID PRN Tommy Linares MD       • sodium chloride 0.9 % flush 10 mL  10 mL Intravenous Q12H Tommy Linares MD       • sodium chloride 0.9 % flush 10 mL  10 mL Intravenous PRN Tommy Linares MD       • sodium chloride injection 40 mEq  10 mL Intravenous Q5 Min PRN Tommy Linares MD       • sodium citrate 4% anticoagulant solution  4 mL Intracatheter PRN Tommy Linares MD       • spironolactone  "(ALDACTONE) tablet 50 mg  50 mg Oral BID Elmer Johnson MD       • SUMAtriptan (IMITREX) tablet 50 mg  50 mg Oral Once Tommy Linares MD       • zinc sulfate (ZINCATE) capsule 220 mg  220 mg Oral Daily Tommy Linares MD           Past Medical History:  Past Medical History:   Diagnosis Date   • Angina at rest (CMS/HCC)    • Migraine     Sees Pain Management for injections.    • MVP (mitral valve prolapse)    • Syncope        Past Surgical History:  Past Surgical History:   Procedure Laterality Date   • BREAST BIOPSY Right 2011    benign   • INSERTION HEMODIALYSIS CATHETER Left 9/16/2021    Procedure: HEMODIALYSIS CATHETER INSERTION;  Surgeon: Anders Kaur MD;  Location: [facility] OR ;  Service: Vascular;  Laterality: Left;   • TONSILLECTOMY         Family History  Family History   Problem Relation Age of Onset   • Colon cancer Maternal Aunt 52   • Fibromyalgia Mother    • Heart disease Mother    • Hypertension Mother    • Hodgkin's lymphoma Paternal Grandmother    • Ovarian cancer Neg Hx    • Breast cancer Neg Hx        Social History  Social History     Socioeconomic History   • Marital status:      Spouse name: Not on file   • Number of children: Not on file   • Years of education: Not on file   • Highest education level: Not on file   Tobacco Use   • Smoking status: Never Smoker   • Smokeless tobacco: Never Used   Substance and Sexual Activity   • Alcohol use: No   • Drug use: No         Review of Systems:  History obtained from chart review and the patient  General ROS: No fever or chills  Respiratory ROS: No cough, shortness of breath, wheezing  Cardiovascular ROS: No chest pain or palpitations  Gastrointestinal ROS: No abdominal pain or melena  Genito-Urinary ROS: No dysuria or hematuria  Psych ROS: No anxiety and depression    Objective:  Patient Vitals for the past 24 hrs:   Height Weight   10/08/21 1511 170.2 cm (67\") --   10/08/21 1510 170.2 cm (67\") -- " "  10/08/21 1509 170.2 cm (67\") 95 kg (209 lb 8 oz)     No intake or output data in the 24 hours ending 10/08/21 1938  General: awake/alert   Chest:  clear to auscultation bilaterally without respiratory distress  CVS: regular rate and rhythm  Abdominal: soft, nontender, positive bowel sounds  Extremities: ble edema  Skin: warm and dry without rash      Labs:  Results from last 7 days   Lab Units 10/07/21  0456 10/04/21  0445 10/02/21  0447   WBC 10*3/mm3 11.22* 10.81* 9.29   HEMOGLOBIN g/dL 9.9* 9.7* 9.3*   HEMATOCRIT % 31.7* 31.2* 29.5*   PLATELETS 10*3/mm3 426 439 406         Results from last 7 days   Lab Units 10/08/21  0451 10/07/21  0456 10/06/21  0427 10/05/21  0416 10/04/21  0445   SODIUM mmol/L 138 136 136   < > 140   POTASSIUM mmol/L 3.5 3.2* 3.8   < > 3.0*   CHLORIDE mmol/L 106 103 105   < > 105   CO2 mmol/L 19.0* 21.0* 21.0*   < > 22.0   BUN mg/dL 10 9 8   < > 13   CREATININE mg/dL 0.88 0.81 0.71   < > 0.90   CALCIUM mg/dL 9.8 9.7 9.3   < > 9.7   BILIRUBIN mg/dL  --   --   --   --  0.6   ALK PHOS U/L  --   --   --   --  91   ALT (SGPT) U/L  --   --   --   --  20   AST (SGOT) U/L  --   --   --   --  23   GLUCOSE mg/dL 112* 103* 98   < > 101*    < > = values in this interval not displayed.       Radiology:   Imaging Results (Last 72 Hours)     ** No results found for the last 72 hours. **          Culture:  No results found for: BLOODCX, URINECX, WOUNDCX, MRSACX, RESPCX, STOOLCX      Assessment   Acute kidney injury stage III-now recovered and off dialysis  Obstructive uropathy  Left pelvic hematoma  COVID-19 pneumonia with acute hypoxemic respiratory failure-improved  Pulmonary embolism  Metabolic acidosis  Hypokalemia    Plan:  Diuretics adjusted, continue to monitor labs daily given variable potassium levels  No further need for dialysis anticipated  Encourage therapy  Replace K as needed IV as pt refused oral meds  May need to titrate bicarb if continuing to decline but would potentially exacerbate K " issues and edema    Brody Chow MD  10/8/2021  19:38 CDT

## 2021-10-10 PROCEDURE — 25010000002 ONDANSETRON PER 1 MG: Performed by: INTERNAL MEDICINE

## 2021-10-10 NOTE — PROGRESS NOTES
Vijay Linares M.D.  [facility]        Internal Medicine Progress Note    10/10/2021   14:44 CDT    Name:  Namita Zabala  MRN:    [id number]     Acct:     [id number]   Room:  [address] [place] [address] Day: 0     Admit Date: 9/24/2021  4:31 PM  PCP: David Lara MD    Subjective:     C/C: weakness    Interval History: Status:  stayed the same.  Resting in bed. No family at bedside.Afebrile. Continues to refuse to work with therapy and refused medications and work with nursing staff on ADLs such as bathing and turning. Counseled the patient on its her right shoes that she is alert and oriented. Patient is requesting to be discharged home and stated that we will work to set up her home needs.    Review of Systems   Constitutional: Positive for malaise/fatigue. Negative for chills, decreased appetite, weight gain and weight loss.   HENT: Negative for congestion, ear discharge, hoarse voice and tinnitus.    Eyes: Negative for blurred vision, discharge, visual disturbance and visual halos.   Cardiovascular: Negative for chest pain, claudication, dyspnea on exertion, irregular heartbeat, leg swelling, orthopnea and paroxysmal nocturnal dyspnea.   Respiratory: Negative for cough, shortness of breath, sputum production and wheezing.    Endocrine: Negative for cold intolerance, heat intolerance and polyuria.   Hematologic/Lymphatic: Negative for adenopathy. Does not bruise/bleed easily.   Skin: Negative for dry skin, itching and suspicious lesions.   Musculoskeletal: Negative for arthritis, back pain, falls, joint pain, muscle weakness and myalgias.   Gastrointestinal: Positive for nausea. Negative for abdominal pain, constipation, diarrhea, dysphagia and hematemesis.   Genitourinary: Negative for bladder incontinence, dysuria and frequency.   Neurological: Positive for weakness. Negative for aphonia, disturbances in coordination and dizziness.   Psychiatric/Behavioral: Negative for altered  mental status, depression, memory loss and substance abuse. The patient does not have insomnia and is not nervous/anxious.          Medications:     Allergies:   Allergies   Allergen Reactions   • Coconut Unknown - High Severity   • Nuts Unknown - High Severity   • Penicillins        Current Meds:   Current Facility-Administered Medications:   •  acetaminophen (TYLENOL) tablet 650 mg, 650 mg, Oral, Q6H PRN **OR** acetaminophen (TYLENOL) suppository 650 mg, 650 mg, Rectal, Q6H PRN, Tommy Linares MD  •  acetaminophen (TYLENOL) tablet 1,000 mg, 1,000 mg, Oral, Q4H PRN, Tommy Linares MD  •  ALPRAZolam (XANAX) tablet 0.25 mg, 0.25 mg, Oral, BID PRN, Tommy Linares MD  •  ascorbic acid (VITAMIN C) tablet 500 mg, 500 mg, Oral, Daily, Tommy Linares MD  •  cholecalciferol (VITAMIN D3) tablet 1,000 Units, 1,000 Units, Oral, Daily, Tommy Linares MD  •  cloNIDine (CATAPRES-TTS) 0.1 MG/24HR patch 1 patch, 1 patch, Transdermal, Weekly, Tommy Linares MD  •  dextrose (D50W) 25 g/ 50mL Intravenous Solution 25 g, 25 g, Intravenous, Q15 Min PRN, Tommy Linares MD  •  dextrose (GLUTOSE) oral gel 15 g, 15 g, Oral, Q15 Min PRN, Tommy Linares MD  •  glucagon (human recombinant) (GLUCAGEN DIAGNOSTIC) injection 1 mg, 1 mg, Subcutaneous, Q15 Min PRN, Tommy Linares MD  •  heparin (porcine) injection 3,600 Units, 3,600 Units, Intracatheter, PRN, Tommy Linares MD  •  hydrALAZINE (APRESOLINE) injection 10 mg, 10 mg, Intravenous, Q6H PRN, Tommy Linares MD  •  hydrocortisone-bacitracin-zinc oxide-nystatin (MAGIC BARRIER) ointment 1 application, 1 application, Topical, TID, Dea Puentes MD  •  labetalol (NORMODYNE,TRANDATE) injection 20 mg, 20 mg, Intravenous, Q6H PRN, Tommy Linares MD  •  lidocaine (LIDODERM) 5 % 1 patch, 1 patch, Transdermal, Q24H, Tommy Linares MD  •  lidocaine (LIDODERM) 5 % 1 patch, 1 patch, Transdermal,  Q24H, Tommy Linares MD  •  magnesium oxide (MAG-OX) tablet 400 mg, 400 mg, Oral, Daily, Elmer Johnson MD  •  metoprolol tartrate (LOPRESSOR) tablet 50 mg, 50 mg, Oral, Q12H, Elmer Johnson MD  •  midodrine (PROAMATINE) tablet 2.5 mg, 2.5 mg, Oral, BID AC, Elmer Johnson MD  •  naloxone (NARCAN) injection 0.2 mg, 0.2 mg, Intravenous, PRN, Tommy Linares MD  •  ondansetron (ZOFRAN) injection 4 mg, 4 mg, Intravenous, Q6H PRN, Tommy Linares MD  •  ondansetron (ZOFRAN) tablet 4 mg, 4 mg, Oral, Q6H PRN, Tommy Linares MD  •  pantoprazole (PROTONIX) EC tablet 40 mg, 40 mg, Oral, Q AM, Tommy Linares MD  •  potassium chloride (MICRO-K) CR capsule 40 mEq, 40 mEq, Oral, Daily, Elmer Johnson MD  •  saccharomyces boulardii (FLORASTOR) capsule 250 mg, 250 mg, Oral, BID, Tommy Linares MD  •  sennosides-docusate (PERICOLACE) 8.6-50 MG per tablet 1 tablet, 1 tablet, Oral, BID PRN, Tommy Linares MD  •  simethicone (MYLICON) chewable tablet 80 mg, 80 mg, Oral, 4x Daily PRN, Sapphire Garcia, BERT  •  sodium chloride 0.9 % flush 10 mL, 10 mL, Intravenous, Q12H, Tommy Linares MD  •  sodium chloride 0.9 % flush 10 mL, 10 mL, Intravenous, PRN, Tommy Linares MD  •  sodium chloride injection 40 mEq, 10 mL, Intravenous, Q5 Min PRN, Tommy Linares MD  •  sodium citrate 4% anticoagulant solution, 4 mL, Intracatheter, PRN, Tommy Linares MD  •  spironolactone (ALDACTONE) tablet 50 mg, 50 mg, Oral, BID, Elmer Johnson MD  •  SUMAtriptan (IMITREX) tablet 50 mg, 50 mg, Oral, Once, Tommy Linares MD  •  zinc sulfate (ZINCATE) capsule 220 mg, 220 mg, Oral, Daily, Tommy Linares MD    Data:     Code Status:    There are no questions and answers to display.       Family History   Problem Relation Age of Onset   • Colon cancer Maternal Aunt 52   • Fibromyalgia Mother    • Heart disease Mother   "  • Hypertension Mother    • Hodgkin's lymphoma Paternal Grandmother    • Ovarian cancer Neg Hx    • Breast cancer Neg Hx        Social History     Socioeconomic History   • Marital status:    Tobacco Use   • Smoking status: Never Smoker   • Smokeless tobacco: Never Used   Substance and Sexual Activity   • Alcohol use: No   • Drug use: No       Vitals:  Ht 170.2 cm (67\")   Wt 95 kg (209 lb 8 oz)   LMP  (LMP Unknown)   BMI 32.81 kg/m²   T 97.2 pulse 125 respiratory 30 blood pressure 127/74 sat 82% which is recovering increase up to nineties (room air)    I/O (24Hr):  No intake or output data in the 24 hours ending 10/10/21 1444    Labs and imaging:      No results found for this or any previous visit (from the past 12 hour(s)).              Physical Examination:        Physical Exam  Vitals and nursing note reviewed.   Constitutional:       Appearance: Normal appearance.   HENT:      Head: Normocephalic.      Right Ear: External ear normal.      Left Ear: External ear normal.      Nose: Nose normal.      Mouth/Throat:      Mouth: Mucous membranes are moist.      Pharynx: Oropharynx is clear.   Eyes:      Extraocular Movements: Extraocular movements intact.      Conjunctiva/sclera: Conjunctivae normal.      Pupils: Pupils are equal, round, and reactive to light.   Cardiovascular:      Rate and Rhythm: Normal rate and regular rhythm.      Heart sounds: Murmur heard.       Pulmonary:      Effort: Pulmonary effort is normal.      Breath sounds: Normal breath sounds.   Abdominal:      General: Bowel sounds are normal. There is distension.      Palpations: Abdomen is soft.   Musculoskeletal:      Cervical back: Normal range of motion and neck supple.      Right lower leg: Edema present.      Left lower leg: Edema present.      Comments: Generalized weakness  Edema to BLE - worse to right   Skin:     General: Skin is warm and dry.   Neurological:      Mental Status: She is alert and oriented to person, place, and " time.   Psychiatric:         Mood and Affect: Mood normal.         Behavior: Behavior normal.           Assessment:            * No active hospital problems. *    Past Medical History:   Diagnosis Date   • Angina at rest (CMS/HCC)    • Migraine     Sees Pain Management for injections.    • MVP (mitral valve prolapse)    • Syncope         Plan:        1. Acute RLL pulmonary embolism  2. S/p COVID 19  3. Acute renal failure - resolved  4. Rectus sheath and pelvic hematoma  5. Multifactorial anemia  6. MVP  7. HTN  8. Hypokalemia    We discussed with the patient and her  home needs and work to set up discharge patient has been counseled that she needs increased therapies from our perspective however the patient is alert and oriented and is able to make her own decisions and therefore we will continue to offer but will allow the patient to decline therapies.      Electronically signed by Tommy Linares MD on 10/10/2021 at 14:44 CDT

## 2021-10-10 NOTE — PROGRESS NOTES
Nephrology ([facility]) Progress Note      Patient:  Namita Zabala  YOB: 1975  Date of Service: 10/10/2021  MRN: [id number]   Acct: [id number]   Primary Care Physician: David Lara MD  Advance Directive:   There are no questions and answers to display.     Admit Date: 9/24/2021       Hospital Day: 0  Referring Provider: Tommy Linares MD      Patient personally seen and examined.  Complete chart including Consults, Notes, Operative Reports, Labs, Cardiology, and Radiology studies reviewed as able.        Subjective:  Namita Zabala is a 46 y.o. female for whom we were consulted for evaluation and treatment of acute kidney injury.  Patient presented on 8/27 with dyspnea, hypoxia. Diagnosed with COVID-19 pneumonia. Had a CT angiogram which showed right pulmonary embolism. Intubated on 8/27. Renal function had been steadily declining since 9/2.  Hemoglobin dropped to <6 on 9/4 and imaging revealed large retroperitoneal hematoma causing bilateral hydronephrosis and shifting of bladder. Her renal function did not improve with IV fluids. Dr Kaur placed vascath on 9/8 and patient had first dialysis the same day. Extubated on 9/10. She later had permcath placed on 9/16, femoral line removed same day. Moved to medical floor but has returned to CCU for fever, tachycardia, chest pain. Patient has an NG tube. She was stabilized and was moved back to the medical floor. On September 24, she was moved to long-term acute care hospital for further care that include rehabilitation and continuation of dialysis.  On September 29, her hemodialysis treatment has been discontinued as she had shown good renal recovery.  On October 1, her permacath was removed.    Today, no overnight events.  Patient notes continued weakness.  Her potassium level had improved with replacement.  Denied current chest pain, shortness of air at rest, nausea or vomiting.  Hoping for home  therapy.  Notes continued gas pains.    Temp- 97.2  Pulse- 125  Resp- 30  BP- 127/74  O2%- 82      Allergies:  Coconut, Nuts, and Penicillins    Home Meds:  Medications Prior to Admission   Medication Sig Dispense Refill Last Dose   • acetaminophen (TYLENOL) 325 MG tablet Take 2 tablets by mouth Every 6 (Six) Hours As Needed for Mild Pain , Fever or Headache (fever greater than 101.5 F or headache).      • [] ALPRAZolam (XANAX) 0.25 MG tablet Take 1 tablet by mouth 2 (Two) Times a Day As Needed for Anxiety for up to 5 days.      • bisacodyl (DULCOLAX) 10 MG suppository Insert 1 suppository into the rectum Daily.      • cloNIDine (CATAPRES-TTS) 0.1 MG/24HR patch Place 1 patch on the skin as directed by provider 1 (One) Time Per Week.      • dextrose (D50W) 50 % solution Infuse 50 mL into a venous catheter Every 15 (Fifteen) Minutes As Needed for Low Blood Sugar (Blood Sugar Less Than 70).      • dextrose (GLUTOSE) 40 % gel Take 15 g by mouth Every 15 (Fifteen) Minutes As Needed for Low Blood Sugar (Blood sugar less than 70).      • epoetin mindy-epbx (RETACRIT) 24581 UNIT/ML injection Inject 1 mL under the skin into the appropriate area as directed 3 (Three) Times a Week. Indications: ESRD on Dialysis 6.6 mL     • [] fluconazole (DIFLUCAN) 200-0.9 MG/100ML-% IVPB Infuse 100 mL into a venous catheter Daily for 10 doses. Indications: Urinary Tract Infection 1000 mL 0    • glucagon, human recombinant, (GLUCAGEN DIAGNOSTIC) 1 MG injection Inject 1 mg under the skin into the appropriate area as directed Every 15 (Fifteen) Minutes As Needed (Blood Glucose Less Than 70) for up to 360 days.      • Heparin Sodium, Porcine, (heparin, porcine,) 1000 UNIT/ML injection 3.6 mL by Intracatheter route As Needed (after dialysis). Indications: Other - full anticoagulation      • insulin regular (humuLIN R,novoLIN R) 100 UNIT/ML injection Inject 0-9 Units under the skin into the appropriate area as directed Every 6 (Six)  Hours.  12    • lidocaine (LIDODERM) 5 % Place 1 patch on the skin as directed by provider Daily. Remove & Discard patch within 12 hours or as directed by MD      • lidocaine (LIDODERM) 5 % Place 1 patch on the skin as directed by provider Daily. Remove & Discard patch within 12 hours or as directed by MD      • metoprolol tartrate (LOPRESSOR) 25 MG tablet Take 1 tablet by mouth Every 12 (Twelve) Hours.      • ondansetron (ZOFRAN) 4 MG/2ML injection Infuse 2 mL into a venous catheter Every 6 (Six) Hours As Needed for Nausea or Vomiting.      • pantoprazole (PROTONIX) 40 MG EC tablet Take 1 tablet by mouth Every Morning.      • sennosides-docusate (PERICOLACE) 8.6-50 MG per tablet Take 1 tablet by mouth 2 (Two) Times a Day.          Medicines:  Current Facility-Administered Medications   Medication Dose Route Frequency Provider Last Rate Last Admin   • acetaminophen (TYLENOL) tablet 650 mg  650 mg Oral Q6H PRN Tommy Linares MD        Or   • acetaminophen (TYLENOL) suppository 650 mg  650 mg Rectal Q6H PRN Tommy Linares MD       • acetaminophen (TYLENOL) tablet 1,000 mg  1,000 mg Oral Q4H PRN Tommy Linares MD       • ALPRAZolam (XANAX) tablet 0.25 mg  0.25 mg Oral BID PRN Tommy Linares MD       • ascorbic acid (VITAMIN C) tablet 500 mg  500 mg Oral Daily Tommy Linares MD       • cholecalciferol (VITAMIN D3) tablet 1,000 Units  1,000 Units Oral Daily Tommy Linares MD       • cloNIDine (CATAPRES-TTS) 0.1 MG/24HR patch 1 patch  1 patch Transdermal Weekly Tommy Linares MD       • dextrose (D50W) 25 g/ 50mL Intravenous Solution 25 g  25 g Intravenous Q15 Min PRN Tommy Linares MD       • dextrose (GLUTOSE) oral gel 15 g  15 g Oral Q15 Min PRN Tommy Linares MD       • glucagon (human recombinant) (GLUCAGEN DIAGNOSTIC) injection 1 mg  1 mg Subcutaneous Q15 Min PRN Tommy Linares MD       • heparin (porcine) injection 3,600 Units  3,600  Units Intracatheter PRN Tommy Linares MD       • hydrALAZINE (APRESOLINE) injection 10 mg  10 mg Intravenous Q6H PRN Tommy Linares MD       • hydrocortisone-bacitracin-zinc oxide-nystatin (MAGIC BARRIER) ointment 1 application  1 application Topical TID Dea Puentes MD       • labetalol (NORMODYNE,TRANDATE) injection 20 mg  20 mg Intravenous Q6H PRN Tommy Linares MD       • lidocaine (LIDODERM) 5 % 1 patch  1 patch Transdermal Q24H Tommy Linares MD       • lidocaine (LIDODERM) 5 % 1 patch  1 patch Transdermal Q24H Tommy Linares MD       • magnesium oxide (MAG-OX) tablet 400 mg  400 mg Oral Daily Elmer Johnson MD       • metoprolol tartrate (LOPRESSOR) tablet 50 mg  50 mg Oral Q12H Elmer Johnson MD       • midodrine (PROAMATINE) tablet 2.5 mg  2.5 mg Oral BID AC Elmer Johnson MD       • naloxone (NARCAN) injection 0.2 mg  0.2 mg Intravenous PRN Tommy Linares MD       • ondansetron (ZOFRAN) injection 4 mg  4 mg Intravenous Q6H PRN Tommy Linares MD       • ondansetron (ZOFRAN) tablet 4 mg  4 mg Oral Q6H PRN Tommy Linares MD       • pantoprazole (PROTONIX) EC tablet 40 mg  40 mg Oral Q AM Tommy Linares MD       • potassium chloride (MICRO-K) CR capsule 40 mEq  40 mEq Oral Daily Elmer Johnson MD       • saccharomyces boulardii (FLORASTOR) capsule 250 mg  250 mg Oral BID Tommy Linares MD       • sennosides-docusate (PERICOLACE) 8.6-50 MG per tablet 1 tablet  1 tablet Oral BID PRN Tommy Linares MD       • simethicone (MYLICON) chewable tablet 80 mg  80 mg Oral 4x Daily PRN Sapphire Garcia APRN       • sodium chloride 0.9 % flush 10 mL  10 mL Intravenous Q12H Tmomy Linares MD       • sodium chloride 0.9 % flush 10 mL  10 mL Intravenous PRN Tommy Linares MD       • sodium chloride injection 40 mEq  10 mL Intravenous Q5 Min PRN Tommy Linares MD       •  sodium citrate 4% anticoagulant solution  4 mL Intracatheter PRN Tommy Linares MD       • spironolactone (ALDACTONE) tablet 50 mg  50 mg Oral BID Elmer Johnson MD       • SUMAtriptan (IMITREX) tablet 50 mg  50 mg Oral Once Tommy Linares MD       • zinc sulfate (ZINCATE) capsule 220 mg  220 mg Oral Daily Tommy Linares MD           Past Medical History:  Past Medical History:   Diagnosis Date   • Angina at rest (CMS/HCC)    • Migraine     Sees Pain Management for injections.    • MVP (mitral valve prolapse)    • Syncope        Past Surgical History:  Past Surgical History:   Procedure Laterality Date   • BREAST BIOPSY Right 2011    benign   • INSERTION HEMODIALYSIS CATHETER Left 9/16/2021    Procedure: HEMODIALYSIS CATHETER INSERTION;  Surgeon: Anders Kaur MD;  Location: [place] [address];  Service: Vascular;  Laterality: Left;   • TONSILLECTOMY         Family History  Family History   Problem Relation Age of Onset   • Colon cancer Maternal Aunt 52   • Fibromyalgia Mother    • Heart disease Mother    • Hypertension Mother    • Hodgkin's lymphoma Paternal Grandmother    • Ovarian cancer Neg Hx    • Breast cancer Neg Hx        Social History  Social History     Socioeconomic History   • Marital status:    Tobacco Use   • Smoking status: Never Smoker   • Smokeless tobacco: Never Used   Substance and Sexual Activity   • Alcohol use: No   • Drug use: No         Review of Systems:  History obtained from chart review and the patient  General ROS: No fever or chills  Respiratory ROS: No cough, shortness of breath, wheezing  Cardiovascular ROS: No chest pain or palpitations  Gastrointestinal ROS: No abdominal pain or melena  Genito-Urinary ROS: No dysuria or hematuria  Psych ROS: No anxiety and depression    Objective:  No data found.  No intake or output data in the 24 hours ending 10/10/21 5191  General: awake/alert   Chest:  clear to auscultation bilaterally without  respiratory distress  CVS: regular rate and rhythm  Abdominal: soft, nontender, positive bowel sounds  Extremities: ble edema  Skin: warm and dry without rash      Labs:  Results from last 7 days   Lab Units 10/07/21  0456 10/04/21  0445   WBC 10*3/mm3 11.22* 10.81*   HEMOGLOBIN g/dL 9.9* 9.7*   HEMATOCRIT % 31.7* 31.2*   PLATELETS 10*3/mm3 426 439         Results from last 7 days   Lab Units 10/08/21  0451 10/07/21  0456 10/06/21  0427 10/05/21  0416 10/04/21  0445   SODIUM mmol/L 138 136 136   < > 140   POTASSIUM mmol/L 3.5 3.2* 3.8   < > 3.0*   CHLORIDE mmol/L 106 103 105   < > 105   CO2 mmol/L 19.0* 21.0* 21.0*   < > 22.0   BUN mg/dL 10 9 8   < > 13   CREATININE mg/dL 0.88 0.81 0.71   < > 0.90   CALCIUM mg/dL 9.8 9.7 9.3   < > 9.7   BILIRUBIN mg/dL  --   --   --   --  0.6   ALK PHOS U/L  --   --   --   --  91   ALT (SGPT) U/L  --   --   --   --  20   AST (SGOT) U/L  --   --   --   --  23   GLUCOSE mg/dL 112* 103* 98   < > 101*    < > = values in this interval not displayed.       Radiology:   Imaging Results (Last 72 Hours)     Procedure Component Value Units Date/Time    XR Abdomen KUB [[id number]] Collected: 10/09/21 1223     Updated: 10/09/21 1228    Narrative:      EXAMINATION: KUB 10/9/2021     HISTORY: Continuous nausea and abdominal pain     FINDINGS: Today's exam is compared to previous study of one week  earlier. There is persistent progressive distention of the colon with  the transverse colon measuring approximately 11.1 cm in diameter. This  suggests some mild progression from the previous study. The rectosigmoid  colon is not well demonstrated.       Impression:      1.. Persistent colonic distention showing some progression from previous  study of one week earlier. The patient has a history of pelvic hematoma  and this may represent a distal colonic obstruction related to this  hematoma.  This report was finalized on 10/09/2021 12:25 by Dr. Yuniel De Jesus MD.          Culture:  No results found  for: BLOODCX, URINECX, WOUNDCX, MRSACX, RESPCX, STOOLCX      Assessment   Acute kidney injury stage III-now recovered and off dialysis  Obstructive uropathy  Left pelvic hematoma  COVID-19 pneumonia with acute hypoxemic respiratory failure-improved  Pulmonary embolism  Metabolic acidosis  Hypokalemia    Plan:  Diuretics adjusted, continue to monitor labs daily given variable potassium levels  No further need for dialysis anticipated  Encourage therapy  Replace K as needed IV as pt refused oral meds  May need to titrate bicarb if continuing to decline but would potentially exacerbate K issues and edema  D/w pt/nursing  We will sign off at this point, happy to see again as needed    Brody Chow MD  10/10/2021  18:31 CDT

## 2021-10-10 NOTE — PROGRESS NOTES
Nephrology ([facility]) Progress Note      Patient:  Namita Zabala  YOB: 1975  Date of Service: 10/9/2021  MRN: [id number]   Acct: [id number]   Primary Care Physician: David Lara MD  Advance Directive:   There are no questions and answers to display.     Admit Date: 9/24/2021       Hospital Day: 0  Referring Provider: Tommy Linares MD      Patient personally seen and examined.  Complete chart including Consults, Notes, Operative Reports, Labs, Cardiology, and Radiology studies reviewed as able.        Subjective:  Namita Zabala is a 46 y.o. female for whom we were consulted for evaluation and treatment of acute kidney injury.  Patient presented on 8/27 with dyspnea, hypoxia. Diagnosed with COVID-19 pneumonia. Had a CT angiogram which showed right pulmonary embolism. Intubated on 8/27. Renal function had been steadily declining since 9/2.  Hemoglobin dropped to <6 on 9/4 and imaging revealed large retroperitoneal hematoma causing bilateral hydronephrosis and shifting of bladder. Her renal function did not improve with IV fluids. Dr Kaur placed vascath on 9/8 and patient had first dialysis the same day. Extubated on 9/10. She later had permcath placed on 9/16, femoral line removed same day. Moved to medical floor but has returned to CCU for fever, tachycardia, chest pain. Patient has an NG tube. She was stabilized and was moved back to the medical floor. On September 24, she was moved to long-term acute care hospital for further care that include rehabilitation and continuation of dialysis.  On September 29, her hemodialysis treatment has been discontinued as she had shown good renal recovery.  On October 1, her permacath was removed.    Today, no overnight events.  Patient notes continued weakness.  Her potassium level had improved with replacement.  Denied current chest pain, shortness of air at rest, nausea or vomiting.  Hoping for home  therapy.  Notes gas pains.    Temp- 98.9  Pulse- 112  Resp- 27  BP- 118/87  O2%- 97      Allergies:  Coconut, Nuts, and Penicillins    Home Meds:  Medications Prior to Admission   Medication Sig Dispense Refill Last Dose   • acetaminophen (TYLENOL) 325 MG tablet Take 2 tablets by mouth Every 6 (Six) Hours As Needed for Mild Pain , Fever or Headache (fever greater than 101.5 F or headache).      • [] ALPRAZolam (XANAX) 0.25 MG tablet Take 1 tablet by mouth 2 (Two) Times a Day As Needed for Anxiety for up to 5 days.      • bisacodyl (DULCOLAX) 10 MG suppository Insert 1 suppository into the rectum Daily.      • cloNIDine (CATAPRES-TTS) 0.1 MG/24HR patch Place 1 patch on the skin as directed by provider 1 (One) Time Per Week.      • dextrose (D50W) 50 % solution Infuse 50 mL into a venous catheter Every 15 (Fifteen) Minutes As Needed for Low Blood Sugar (Blood Sugar Less Than 70).      • dextrose (GLUTOSE) 40 % gel Take 15 g by mouth Every 15 (Fifteen) Minutes As Needed for Low Blood Sugar (Blood sugar less than 70).      • epoetin mindy-epbx (RETACRIT) 08453 UNIT/ML injection Inject 1 mL under the skin into the appropriate area as directed 3 (Three) Times a Week. Indications: ESRD on Dialysis 6.6 mL     • [] fluconazole (DIFLUCAN) 200-0.9 MG/100ML-% IVPB Infuse 100 mL into a venous catheter Daily for 10 doses. Indications: Urinary Tract Infection 1000 mL 0    • glucagon, human recombinant, (GLUCAGEN DIAGNOSTIC) 1 MG injection Inject 1 mg under the skin into the appropriate area as directed Every 15 (Fifteen) Minutes As Needed (Blood Glucose Less Than 70) for up to 360 days.      • Heparin Sodium, Porcine, (heparin, porcine,) 1000 UNIT/ML injection 3.6 mL by Intracatheter route As Needed (after dialysis). Indications: Other - full anticoagulation      • insulin regular (humuLIN R,novoLIN R) 100 UNIT/ML injection Inject 0-9 Units under the skin into the appropriate area as directed Every 6 (Six) Hours.  12     • lidocaine (LIDODERM) 5 % Place 1 patch on the skin as directed by provider Daily. Remove & Discard patch within 12 hours or as directed by MD      • lidocaine (LIDODERM) 5 % Place 1 patch on the skin as directed by provider Daily. Remove & Discard patch within 12 hours or as directed by MD      • metoprolol tartrate (LOPRESSOR) 25 MG tablet Take 1 tablet by mouth Every 12 (Twelve) Hours.      • ondansetron (ZOFRAN) 4 MG/2ML injection Infuse 2 mL into a venous catheter Every 6 (Six) Hours As Needed for Nausea or Vomiting.      • pantoprazole (PROTONIX) 40 MG EC tablet Take 1 tablet by mouth Every Morning.      • sennosides-docusate (PERICOLACE) 8.6-50 MG per tablet Take 1 tablet by mouth 2 (Two) Times a Day.          Medicines:  Current Facility-Administered Medications   Medication Dose Route Frequency Provider Last Rate Last Admin   • acetaminophen (TYLENOL) tablet 650 mg  650 mg Oral Q6H PRN Tommy Linares MD        Or   • acetaminophen (TYLENOL) suppository 650 mg  650 mg Rectal Q6H PRN Tommy Linares MD       • acetaminophen (TYLENOL) tablet 1,000 mg  1,000 mg Oral Q4H PRN Tommy Linares MD       • ALPRAZolam (XANAX) tablet 0.25 mg  0.25 mg Oral BID PRN Tommy Linares MD       • ascorbic acid (VITAMIN C) tablet 500 mg  500 mg Oral Daily Tommy Linares MD       • cholecalciferol (VITAMIN D3) tablet 1,000 Units  1,000 Units Oral Daily Tommy Linares MD       • cloNIDine (CATAPRES-TTS) 0.1 MG/24HR patch 1 patch  1 patch Transdermal Weekly Tommy Linares MD       • dextrose (D50W) 25 g/ 50mL Intravenous Solution 25 g  25 g Intravenous Q15 Min PRN Tommy Linares MD       • dextrose (GLUTOSE) oral gel 15 g  15 g Oral Q15 Min PRN Tommy Linares MD       • glucagon (human recombinant) (GLUCAGEN DIAGNOSTIC) injection 1 mg  1 mg Subcutaneous Q15 Min PRN Tommy Linares MD       • heparin (porcine) injection 3,600 Units  3,600 Units  Intracatheter PRN Tommy Linares MD       • hydrALAZINE (APRESOLINE) injection 10 mg  10 mg Intravenous Q6H PRN Tommy Linares MD       • hydrocortisone-bacitracin-zinc oxide-nystatin (MAGIC BARRIER) ointment 1 application  1 application Topical TID Dea Puentes MD       • labetalol (NORMODYNE,TRANDATE) injection 20 mg  20 mg Intravenous Q6H PRN Tommy Linares MD       • lidocaine (LIDODERM) 5 % 1 patch  1 patch Transdermal Q24H Tommy Linares MD       • lidocaine (LIDODERM) 5 % 1 patch  1 patch Transdermal Q24H Tommy Linares MD       • magnesium oxide (MAG-OX) tablet 400 mg  400 mg Oral Daily Elmer Johnson MD       • metoprolol tartrate (LOPRESSOR) tablet 50 mg  50 mg Oral Q12H Elmer Johnson MD       • midodrine (PROAMATINE) tablet 2.5 mg  2.5 mg Oral BID AC Elmer Johnson MD       • naloxone (NARCAN) injection 0.2 mg  0.2 mg Intravenous PRN Tommy Linares MD       • ondansetron (ZOFRAN) injection 4 mg  4 mg Intravenous Q6H PRN Tommy Linares MD       • ondansetron (ZOFRAN) tablet 4 mg  4 mg Oral Q6H PRN Tommy Linares MD       • pantoprazole (PROTONIX) EC tablet 40 mg  40 mg Oral Q AM Tommy Linares MD       • potassium chloride (MICRO-K) CR capsule 40 mEq  40 mEq Oral Daily Elmer Johnson MD       • saccharomyces boulardii (FLORASTOR) capsule 250 mg  250 mg Oral BID Tommy Linares MD       • sennosides-docusate (PERICOLACE) 8.6-50 MG per tablet 1 tablet  1 tablet Oral BID PRN Tommy Linares MD       • simethicone (MYLICON) chewable tablet 80 mg  80 mg Oral 4x Daily PRN Sapphire Garcia APRN       • sodium chloride 0.9 % flush 10 mL  10 mL Intravenous Q12H Tommy Linares MD       • sodium chloride 0.9 % flush 10 mL  10 mL Intravenous PRN Tommy Linares MD       • sodium chloride injection 40 mEq  10 mL Intravenous Q5 Min PRN Tommy Linares MD       • sodium  citrate 4% anticoagulant solution  4 mL Intracatheter PRN Tommy Linares MD       • spironolactone (ALDACTONE) tablet 50 mg  50 mg Oral BID Elmer Johnson MD       • SUMAtriptan (IMITREX) tablet 50 mg  50 mg Oral Once Tommy Linares MD       • zinc sulfate (ZINCATE) capsule 220 mg  220 mg Oral Daily Tommy Linares MD           Past Medical History:  Past Medical History:   Diagnosis Date   • Angina at rest (CMS/HCC)    • Migraine     Sees Pain Management for injections.    • MVP (mitral valve prolapse)    • Syncope        Past Surgical History:  Past Surgical History:   Procedure Laterality Date   • BREAST BIOPSY Right 2011    benign   • INSERTION HEMODIALYSIS CATHETER Left 9/16/2021    Procedure: HEMODIALYSIS CATHETER INSERTION;  Surgeon: Anders Kaur MD;  Location: [place] [address];  Service: Vascular;  Laterality: Left;   • TONSILLECTOMY         Family History  Family History   Problem Relation Age of Onset   • Colon cancer Maternal Aunt 52   • Fibromyalgia Mother    • Heart disease Mother    • Hypertension Mother    • Hodgkin's lymphoma Paternal Grandmother    • Ovarian cancer Neg Hx    • Breast cancer Neg Hx        Social History  Social History     Socioeconomic History   • Marital status:    Tobacco Use   • Smoking status: Never Smoker   • Smokeless tobacco: Never Used   Substance and Sexual Activity   • Alcohol use: No   • Drug use: No         Review of Systems:  History obtained from chart review and the patient  General ROS: No fever or chills  Respiratory ROS: No cough, shortness of breath, wheezing  Cardiovascular ROS: No chest pain or palpitations  Gastrointestinal ROS: No abdominal pain or melena  Genito-Urinary ROS: No dysuria or hematuria  Psych ROS: No anxiety and depression    Objective:  No data found.  No intake or output data in the 24 hours ending 10/09/21 9311  General: awake/alert   Chest:  clear to auscultation bilaterally without  respiratory distress  CVS: regular rate and rhythm  Abdominal: soft, nontender, positive bowel sounds  Extremities: ble edema  Skin: warm and dry without rash      Labs:  Results from last 7 days   Lab Units 10/07/21  0456 10/04/21  0445   WBC 10*3/mm3 11.22* 10.81*   HEMOGLOBIN g/dL 9.9* 9.7*   HEMATOCRIT % 31.7* 31.2*   PLATELETS 10*3/mm3 426 439         Results from last 7 days   Lab Units 10/08/21  0451 10/07/21  0456 10/06/21  0427 10/05/21  0416 10/04/21  0445   SODIUM mmol/L 138 136 136   < > 140   POTASSIUM mmol/L 3.5 3.2* 3.8   < > 3.0*   CHLORIDE mmol/L 106 103 105   < > 105   CO2 mmol/L 19.0* 21.0* 21.0*   < > 22.0   BUN mg/dL 10 9 8   < > 13   CREATININE mg/dL 0.88 0.81 0.71   < > 0.90   CALCIUM mg/dL 9.8 9.7 9.3   < > 9.7   BILIRUBIN mg/dL  --   --   --   --  0.6   ALK PHOS U/L  --   --   --   --  91   ALT (SGPT) U/L  --   --   --   --  20   AST (SGOT) U/L  --   --   --   --  23   GLUCOSE mg/dL 112* 103* 98   < > 101*    < > = values in this interval not displayed.       Radiology:   Imaging Results (Last 72 Hours)     Procedure Component Value Units Date/Time    XR Abdomen KUB [[id number]] Collected: 10/09/21 1223     Updated: 10/09/21 1228    Narrative:      EXAMINATION: KUB 10/9/2021     HISTORY: Continuous nausea and abdominal pain     FINDINGS: Today's exam is compared to previous study of one week  earlier. There is persistent progressive distention of the colon with  the transverse colon measuring approximately 11.1 cm in diameter. This  suggests some mild progression from the previous study. The rectosigmoid  colon is not well demonstrated.       Impression:      1.. Persistent colonic distention showing some progression from previous  study of one week earlier. The patient has a history of pelvic hematoma  and this may represent a distal colonic obstruction related to this  hematoma.  This report was finalized on 10/09/2021 12:25 by Dr. Yuniel De Jesus MD.          Culture:  No results found  for: BLOODCX, URINECX, WOUNDCX, MRSACX, RESPCX, STOOLCX      Assessment   Acute kidney injury stage III-now recovered and off dialysis  Obstructive uropathy  Left pelvic hematoma  COVID-19 pneumonia with acute hypoxemic respiratory failure-improved  Pulmonary embolism  Metabolic acidosis  Hypokalemia    Plan:  Diuretics adjusted, continue to monitor labs daily given variable potassium levels  No further need for dialysis anticipated  Encourage therapy  Replace K as needed IV as pt refused oral meds  May need to titrate bicarb if continuing to decline but would potentially exacerbate K issues and edema  D/w pt/family/nursing    Brody Chow MD  10/9/2021  22:18 CDT

## 2021-10-11 VITALS — BODY MASS INDEX: 33.02 KG/M2 | HEIGHT: 67 IN | WEIGHT: 210.4 LBS

## 2021-10-11 NOTE — PROGRESS NOTES
"Adult Nutrition  Assessment/PES    Patient Name:  Namita Zabala  YOB: 1975  MRN: [id number]  Admit Date:  9/24/2021    Assessment Date:  10/11/2021    Comments:  Staying with full liquid diet (mainly SlimFast). Wt stable. Will continue to monitor.     Reason for Assessment     Row Name 10/11/21 0946          Reason for Assessment    Reason For Assessment follow-up protocol     Diagnosis pulmonary disease; infection/sepsis; renal disease; hematological/related complications; gastrointestinal disease     Identified At Risk by Screening Criteria large or nonhealing wound, burn or pressure injury; reduced oral intake over the last month                Nutrition/Diet History     Row Name 10/11/21 0946          Nutrition/Diet History    Typical Food/Fluid Intake Pt continues to refuse trays. Planning for discharge underway.     Food Allergies peanut/tree nut; other (see comments)  Coconut     Factors Affecting Nutritional Intake altered gastrointestinal function                Anthropometrics     Row Name 10/11/21 0948 10/11/21 0947       Anthropometrics    Height 170.2 cm (67\") 170.2 cm (67\")    Weight -- 95.4 kg (210 lb 6.4 oz)  10/8       Admit Weight    Admit Weight -- 102 kg (225 lb)       Ideal Body Weight (IBW)    Ideal Body Weight (IBW) (kg) 61.86 61.86    % Ideal Body Weight -- 154.27       Body Mass Index (BMI)    BMI (kg/m2) -- 33.02    BMI Assessment -- BMI 30-34.9: obesity grade I    Row Name 10/11/21 0500          Anthropometrics    Weight 94 kg (207 lb 3.2 oz)                Labs/Tests/Procedures/Meds     Row Name 10/11/21 0965          Labs/Procedures/Meds    Lab Results Reviewed reviewed, pertinent     Lab Results Comments Glu            Diagnostic Tests/Procedures    Diagnostic Test/Procedure Reviewed reviewed, pertinent     Diagnostic Test/Procedures Comments HD not required; renal recovery noted            Medications    Pertinent Medications Reviewed reviewed, pertinent     " "Pertinent Medications Comments See MAR                Physical Findings     Row Name 10/11/21 0948          Physical Findings    Overall Physical Appearance generalized wasting     Skin pressure injury                Estimated/Assessed Needs     Row Name 10/11/21 0948 10/11/21 0947       Calculation Measurements    Weight Used For Calculations 95.4 kg (210 lb 5.1 oz) --    Height 170.2 cm (67\") 170.2 cm (67\")       Estimated/Assessed Needs    Additional Documentation Protein Requirements (Group); Fluid Requirements (Group); Bannock-St. Jeor Equation (Group) --       Calorie Requirements    Weight Used For Calorie Calculations 95.4 kg (210 lb 5.1 oz) --    Estimated Calorie Need Method Bannock-St Jeor --       KCAL/KG    25 Kcal/Kg (kcal) 2385 --    30 Kcal/Kg (kcal) 2862 --       Bannock-St. Jeor Equation    RMR (Bannock-St. Jeor Equation) 1636.625 --    Bannock-St. Jeor Activity Factors --  AF not appropriate at this time --       Protein Requirements    Weight Used For Protein Calculations 69.8 kg (153 lb 13.6 oz)  AdjBW --    Est Protein Requirement Amount (gms/kg) 1.0 gm protein --    Estimated Protein Requirements (gms/day) 69.79 --       Fluid Requirements    Fluid Requirements (mL/day) 2000 --    RDA Method (mL) 2000 --               Nutrition Prescription Ordered     Row Name 10/11/21 0950          Nutrition Prescription PO    Current PO Diet Full Liquid     Supplement Other (comment)  SlimFast per pt preference                Evaluation of Received Nutrient/Fluid Intake     Row Name 10/11/21 0962          Nutrient/Fluid Evaluation    Number of Days Evaluated 3 days            Fluid Intake Evaluation    Oral Fluid (mL) 1580  3-day average            PO Evaluation    Number of Days PO Intake Evaluated Insufficient Data     % PO Intake See oral fluid intake above; average output= 1700mL                     Problem/Interventions:   Problem 1     Row Name 10/11/21 0951          Nutrition Diagnoses Problem 1    " Problem 1 Needs Alternate  TF currently on hold d/t ileus     Etiology (related to) Medical Diagnosis     Gastrointestinal Nausea     Signs/Symptoms (evidenced by) PO Intake  full liquid diet; pt does not want to advance to regular food                  Problem 3     Row Name 10/11/21 0951          Nutrition Diagnoses Problem 3    Problem 3 Predicted Suboptimal Intake     Etiology (related to) Medical Diagnosis     Gastrointestinal Nausea     Signs/Symptoms (evidenced by) Other (comment)  Full Liquid Diet     Reported/Observed By Patient     Appetite Poor     Reported GI Symptoms Nausea and vomiting  No vomiting                  Intervention Goal     Row Name 10/11/21 0953          Intervention Goal    General Reduce/improve symptoms; Meet nutritional needs for age/condition; Disease management/therapy     PO Advance diet; Establish PO; Tolerate PO; PO intake (%)     PO Intake % 75 %     Weight No significant weight loss                Nutrition Intervention     Row Name 10/11/21 0953          Nutrition Intervention    RD/Tech Action Care plan reviewd; Follow Tx progress                Nutrition Prescription     Row Name 10/11/21 0953          Nutrition Prescription PO    PO Prescription Other (comment)  Advance per pt preference                Education/Evaluation     Row Name 10/11/21 0953          Education    Education No discharge needs identified at this time            Monitor/Evaluation    Monitor Per protocol                 Electronically signed by:  Morelia Warner RD  10/11/21 09:53 CDT

## 2021-10-12 NOTE — PROGRESS NOTES
Vijay Linares M.D.  BERT Wu        Internal Medicine Progress Note    10/11/2021   22:48 CDT    Name:  Namita Zabala  MRN:    [id number]     Acct:     [id number]   Room:  [address] [place] [address] Day: 0     Admit Date: 9/24/2021  4:31 PM  PCP: David Lara MD    Subjective:     C/C: weakness    Interval History: Status:  stayed the same.  Resting in bed. No family at bedside.Afebrile. Continues to refuse to work with staff on ADLs - including turning in bed and hygiene. Has been dismissed from therapy due to repeated refusals to participate. Reports that her  is being granted FMLA to provide 24 hour care for her at home. Discharge planning underway.     Review of Systems   Constitutional: Positive for malaise/fatigue. Negative for chills, decreased appetite, weight gain and weight loss.   HENT: Negative for congestion, ear discharge, hoarse voice and tinnitus.    Eyes: Negative for blurred vision, discharge, visual disturbance and visual halos.   Cardiovascular: Negative for chest pain, claudication, dyspnea on exertion, irregular heartbeat, leg swelling, orthopnea and paroxysmal nocturnal dyspnea.   Respiratory: Negative for cough, shortness of breath, sputum production and wheezing.    Endocrine: Negative for cold intolerance, heat intolerance and polyuria.   Hematologic/Lymphatic: Negative for adenopathy. Does not bruise/bleed easily.   Skin: Negative for dry skin, itching and suspicious lesions.   Musculoskeletal: Negative for arthritis, back pain, falls, joint pain, muscle weakness and myalgias.   Gastrointestinal: Positive for nausea. Negative for abdominal pain, constipation, diarrhea, dysphagia and hematemesis.   Genitourinary: Negative for bladder incontinence, dysuria and frequency.   Neurological: Positive for weakness. Negative for aphonia, disturbances in coordination and dizziness.   Psychiatric/Behavioral: Negative for altered mental status, depression,  memory loss and substance abuse. The patient does not have insomnia and is not nervous/anxious.          Medications:     Allergies:   Allergies   Allergen Reactions   • Coconut Unknown - High Severity   • Nuts Unknown - High Severity   • Penicillins        Current Meds:   Current Facility-Administered Medications:   •  acetaminophen (TYLENOL) tablet 650 mg, 650 mg, Oral, Q6H PRN **OR** acetaminophen (TYLENOL) suppository 650 mg, 650 mg, Rectal, Q6H PRN, Tommy Linares MD  •  acetaminophen (TYLENOL) tablet 1,000 mg, 1,000 mg, Oral, Q4H PRN, Tommy Linares MD  •  ALPRAZolam (XANAX) tablet 0.25 mg, 0.25 mg, Oral, BID PRN, Tommy Linares MD  •  ascorbic acid (VITAMIN C) tablet 500 mg, 500 mg, Oral, Daily, Tommy Linares MD  •  cholecalciferol (VITAMIN D3) tablet 1,000 Units, 1,000 Units, Oral, Daily, Tommy Linares MD  •  cloNIDine (CATAPRES-TTS) 0.1 MG/24HR patch 1 patch, 1 patch, Transdermal, Weekly, Tommy Linares MD  •  dextrose (D50W) 25 g/ 50mL Intravenous Solution 25 g, 25 g, Intravenous, Q15 Min PRN, Tommy Linares MD  •  dextrose (GLUTOSE) oral gel 15 g, 15 g, Oral, Q15 Min PRN, Tommy Linares MD  •  glucagon (human recombinant) (GLUCAGEN DIAGNOSTIC) injection 1 mg, 1 mg, Subcutaneous, Q15 Min PRN, Tommy Linares MD  •  heparin (porcine) injection 3,600 Units, 3,600 Units, Intracatheter, PRN, Tommy Linares MD  •  hydrALAZINE (APRESOLINE) injection 10 mg, 10 mg, Intravenous, Q6H PRN, Tommy Linares MD  •  hydrocortisone-bacitracin-zinc oxide-nystatin (MAGIC BARRIER) ointment 1 application, 1 application, Topical, TID, Dea Puentes MD  •  labetalol (NORMODYNE,TRANDATE) injection 20 mg, 20 mg, Intravenous, Q6H PRN, Tommy Linares MD  •  lidocaine (LIDODERM) 5 % 1 patch, 1 patch, Transdermal, Q24H, Tommy Linares MD  •  lidocaine (LIDODERM) 5 % 1 patch, 1 patch, Transdermal, Q24H, Tommy Linares,  MD  •  magnesium oxide (MAG-OX) tablet 400 mg, 400 mg, Oral, Daily, Elmer Johnson MD  •  metoprolol tartrate (LOPRESSOR) tablet 50 mg, 50 mg, Oral, Q12H, Elmer Johnson MD  •  midodrine (PROAMATINE) tablet 2.5 mg, 2.5 mg, Oral, BID AC, Elmer Johnson MD  •  naloxone (NARCAN) injection 0.2 mg, 0.2 mg, Intravenous, PRN, Tommy Linares MD  •  ondansetron (ZOFRAN) injection 4 mg, 4 mg, Intravenous, Q6H PRN, Tommy Linares MD  •  ondansetron (ZOFRAN) tablet 4 mg, 4 mg, Oral, Q6H PRN, Tommy Linares MD  •  pantoprazole (PROTONIX) EC tablet 40 mg, 40 mg, Oral, Q AM, Tommy Linares MD  •  potassium chloride (MICRO-K) CR capsule 40 mEq, 40 mEq, Oral, Daily, Elmer Johnson MD  •  saccharomyces boulardii (FLORASTOR) capsule 250 mg, 250 mg, Oral, BID, Tommy Linares MD  •  sennosides-docusate (PERICOLACE) 8.6-50 MG per tablet 1 tablet, 1 tablet, Oral, BID PRN, Tommy Linares MD  •  simethicone (MYLICON) chewable tablet 80 mg, 80 mg, Oral, 4x Daily PRN, Sapphire Garcia, BERT  •  sodium chloride 0.9 % flush 10 mL, 10 mL, Intravenous, Q12H, Tommy Linares MD  •  sodium chloride 0.9 % flush 10 mL, 10 mL, Intravenous, PRN, Tommy Linares MD  •  sodium chloride injection 40 mEq, 10 mL, Intravenous, Q5 Min PRN, Tommy Linares MD  •  sodium citrate 4% anticoagulant solution, 4 mL, Intracatheter, PRN, Tommy Linares MD  •  spironolactone (ALDACTONE) tablet 50 mg, 50 mg, Oral, BID, Elmer Johnson MD  •  SUMAtriptan (IMITREX) tablet 50 mg, 50 mg, Oral, Once, Tommy Linares MD  •  zinc sulfate (ZINCATE) capsule 220 mg, 220 mg, Oral, Daily, Tommy Linares MD    Data:     Code Status:    There are no questions and answers to display.       Family History   Problem Relation Age of Onset   • Colon cancer Maternal Aunt 52   • Fibromyalgia Mother    • Heart disease Mother    • Hypertension Mother    •  "Hodgkin's lymphoma Paternal Grandmother    • Ovarian cancer Neg Hx    • Breast cancer Neg Hx        Social History     Socioeconomic History   • Marital status:    Tobacco Use   • Smoking status: Never Smoker   • Smokeless tobacco: Never Used   Substance and Sexual Activity   • Alcohol use: No   • Drug use: No       Vitals:  Ht 170.2 cm (67\")   Wt 95.4 kg (210 lb 6.4 oz) Comment: 10/8  LMP  (LMP Unknown)   BMI 32.95 kg/m²   T 97.2  P 106 R 32 /97 Sp02 97% (room air)  I/O (24Hr):  No intake or output data in the 24 hours ending 10/11/21 2248    Labs and imaging:      No results found for this or any previous visit (from the past 12 hour(s)).              Physical Examination:        Physical Exam  Vitals and nursing note reviewed.   Constitutional:       Appearance: Normal appearance.   HENT:      Head: Normocephalic.      Right Ear: External ear normal.      Left Ear: External ear normal.      Nose: Nose normal.      Mouth/Throat:      Mouth: Mucous membranes are moist.      Pharynx: Oropharynx is clear.   Eyes:      Extraocular Movements: Extraocular movements intact.      Conjunctiva/sclera: Conjunctivae normal.      Pupils: Pupils are equal, round, and reactive to light.   Cardiovascular:      Rate and Rhythm: Normal rate and regular rhythm.      Heart sounds: Murmur heard.       Pulmonary:      Effort: Pulmonary effort is normal.      Breath sounds: Normal breath sounds.   Abdominal:      General: Bowel sounds are normal. There is distension.      Palpations: Abdomen is soft.   Musculoskeletal:      Cervical back: Normal range of motion and neck supple.      Right lower leg: Edema present.      Left lower leg: Edema present.      Comments: Generalized weakness  Edema to BLE - worse to right   Skin:     General: Skin is warm and dry.   Neurological:      Mental Status: She is alert and oriented to person, place, and time.   Psychiatric:         Mood and Affect: Mood normal.         Behavior: " Behavior normal.           Assessment:            * No active hospital problems. *    Past Medical History:   Diagnosis Date   • Angina at rest (CMS/HCC)    • Migraine     Sees Pain Management for injections.    • MVP (mitral valve prolapse)    • Syncope         Plan:        1. Acute RLL pulmonary embolism  2. S/p COVID 19  3. Acute renal failure - resolved  4. Rectus sheath and pelvic hematoma  5. Multifactorial anemia  6. MVP  7. HTN  8. Hypokalemia    Continue current treatment. Monitor counts. Increase activity as tolerated. Maintain patient safety. Continue off anticoagulation due to hematomas. Refuses SCDs as she states that they cause her legs to break out. Patient has been repeatedly encouraged to participate with therapies and ADLs. She states she will be able to perform at her baseline upon her return to home. She reports that her  has been granted FMLA at work to provide 24 hour assistance to the patient. Home health will be consulted, as well.       Electronically signed by BERT Jamison on 10/11/2021 at 22:48 CDT   I have discussed the care of Namita Zabala, including pertinent history and exam findings, with the nurse practitioner.    I have seen and examined the patient and the key elements of all parts of the encounter have been performed by me.  I agree with the assessment, plan and orders as documented by BERT Wu, after I modified the exam findings and the plan of treatments and the final version is my approved version of the assessment.        Electronically signed by Tommy Linares MD on 10/12/2021 at 03:22 CDT

## 2021-10-12 NOTE — DISCHARGE SUMMARY
TED Moe APRN      Internal Medicine Discharge Summary    Patient ID: Namita Zabala  MRN: [id number]     Acct:  [id number]       Patient's PCP: David Lara MD    Admit Date: 9/24/2021     Discharge Date: 10/12/2021      Admitting Physician: Tommy Linares MD    Discharge Physician: BERT Jamison     Active Discharge Diagnoses:  Acute RLL pulmonary embolism  S/p COVID 19  Acute renal failure - resolved  Rectus sheath and pelvic hematoma  Multifactorial anemia  MVP  HTN  Hypokalemia      Hospital Problems    * No active hospital problems. *     Past Medical History:   Diagnosis Date    Angina at rest (CMS/HCC)     Migraine     Sees Pain Management for injections.     MVP (mitral valve prolapse)     Syncope        The patient was seen and examined on the day of discharge and this discharge summary is in conjunction with any daily progress note from day of discharge.    Code Status:    There are no questions and answers to display.       Hospital Course: Namita Zabala is a  46 y.o.  female who presents with need for continued rehabilitation efforts and hemodialysis following recent acute care stay. She had been previously unvaccinated and had been exposed to her  who was COVID positive. The patient had been in her usual state of health when she presented to ER with increased shortness of breath. On intake, she was noted to have significant hypoxia with sats in the 40s and 50s.  She was treated with bipap, but continue deteriorate and required intubation and mechanical ventilation. She was found positive for COVID 19 after initial swab was negative. D dimer was significantly elevated and CTA confirmed right pulmonary embolus. She was treated with IV antibiotics and IV steroids. The patient developed ileus requiring decompression with NGT. The patient had worsening anemia and workup revealed a large pelvic and  rectus sheath hematoma. Anticoagulation was held and protamine given. No evidence of occult blood and patient required transfusion of blood products. General surgery was consulted, but did not feel that surgery was necessary. Hematoma caused a mass effect leading to hydronephrosis and decreased urinary output. Urology was consulted, but did not feel that surgical intervention was appropriate. The patient had worsening renal function and nephrology was consulted. Lasix drip was started. Fluid trial was unsuccessful. The patient had abdominal distension and gross edema. Hemodialysis was initiated under the direction of nephrology. Sedation was weaned and patient was able to tolerate extubation on 9/10. Due to continued colonic dilatation due to obstruction from hematoma, GI was consulted and patient started on neostigmine drip. Nutrition was provided via TPN. She was transitioned to tube feedings which she tolerated. The patient was then transitioned to oral diet. Due to her need for continued hemodialysis and rehabilitation efforts, she transferred to our facility.   While at our facility, the patient continued on HD under the direction of nephrology. She had continued issues with hypokalemia and initially refused oral replacement/supplementation. She was very slow to progress with therapies and often refused to participate with therapy or in activities of daily living. She continued with bilateral lower extremity edema. Due to known hematomas, anticoagulation was held/contraindicated. The patient started on period while at our facility and reported that she continued her birth control at home without placebo pills because menstrual cycles often bring about her migraines. I explained that given the fact that she has active blood clots that we cannot anticoagulate due to the hematomas, oral birth control is contraindicated. Her abdomen remained distended and repeat scan showed stable pelvic hematoma with possible  "distal colonic obstruction. Follow up Ct showed bladder displacement from the hematoma as well as bilateral hydronephrosis due to mass effect on the bladder with a possible DVT in the left common femoral vein. General surgery was reconsulted and had nothing to offer from a surgical standpoint stating that the only surgical option would be proximal diverting colostomy. The patient had renal recovery and HD was discontinued. Dialysis catheter was removed per vascular surgery on 10/1.   The patient continued to refuse to participate with therapies stating that it was causing her to pass out due to \"neurocardiogenic syncope\". She did not have confidence that our trained therapy could handle her condition and reported that she was being pushed too much and asked to do things too quickly causing her to pass out. There were no documented episodes of true syncope. The patient became very anxious for discharge to home at that point.Patient and  informed that they would need to arrange for 24 hour in-home assistance as the  works full time and that whomever is chosen would need to be able to come to the facility to demonstrate proficiency. Explained several times to the patient and  that she is not a candidate for treatment of the pulmonary embolism due to the large hematomas she developed and that the risk outweighs the benefit of anticoagulation at this time. The patient reported that at home, she is able to get up and get to the couch or chair where her  supplies her with protein bars for her to eat while he's away at work.   reportedly arranged for FMLA so that he can provide 24 hour care to the patient at home. The patient and family have refused SNF placement. At this time, the patient will discharge to home with the assistance of family and home health care and close outpatient follow up with her PCP. She will need repeat BMP to monitor potassium levels.         Consults:   " Sonia (general surgery)  Dr. Johnson (nephrology)    Disposition: home health     Physical Exam  Vitals and nursing note reviewed.   Constitutional:       Appearance: Normal appearance.   HENT:      Head: Normocephalic.      Right Ear: External ear normal.      Left Ear: External ear normal.      Nose: Nose normal.      Mouth/Throat:      Mouth: Mucous membranes are moist.      Pharynx: Oropharynx is clear.   Eyes:      Extraocular Movements: Extraocular movements intact.      Conjunctiva/sclera: Conjunctivae normal.      Pupils: Pupils are equal, round, and reactive to light.   Cardiovascular:      Rate and Rhythm: Normal rate and regular rhythm.      Heart sounds: Murmur heard.   Pulmonary:      Effort: Pulmonary effort is normal.      Breath sounds: Normal breath sounds.   Abdominal:      General: Bowel sounds are normal. There is distension.      Palpations: Abdomen is soft.   Musculoskeletal:      Cervical back: Normal range of motion and neck supple.      Right lower leg: Edema present.      Left lower leg: Edema present.      Comments: Generalized weakness  Edema to BLE - worse to right   Skin:     General: Skin is warm and dry.   Neurological:      Mental Status: She is alert and oriented to person, place, and time.   Psychiatric:         Mood and Affect: Mood normal.         Behavior: Behavior normal.     Discharged Condition: Stable    Follow Up: PCP 1 week    Diet: Diet Full Liquid; Thin    Discharge Medications:   See computer generated medication reconciliation form    Time Spent on discharge is  32 minutes in patient examination, evaluation, patient/family counseling as well as medication reconciliation, prescriptions for required medications, discharge plan and follow up.     Electronically signed by BERT Jamison on 10/12/2021 at 15:23 CDT     I have discussed the care of Namita Zabala, including pertinent history and exam findings, with the nurse practitioner.    I have seen and  examined the patient and the key elements of all parts of the encounter have been performed by me.  I agree with the assessment, plan and orders as documented by BERT Wu, after I modified the exam findings and the plan of treatments and the final version is my approved version of the assessment.        Electronically signed by Tommy Linares MD on 10/13/2021 at 14:16 CDT

## 2021-10-13 ENCOUNTER — TELEPHONE (OUTPATIENT)
Dept: INTERNAL MEDICINE | Age: 46
End: 2021-10-13

## 2021-10-13 NOTE — TELEPHONE ENCOUNTER
Kevin 45 Transitions Initial Follow Up Call    Outreach made within 2 business days of discharge: Yes    Patient: Mitchel Bynum   Patient : 1975  MRN: [id number]   Reason for Admission: Admitted 21 for COVID-19  Discharge Date: 10/12/21  Final Discharge Diagnoses:    Sepsis, unspecified organism (CMS/HCC)    Cytokine release syndrome, grade 4    Hyperkalemia    Pelvic hematoma, female    Acute blood loss anemia    Ileus (CMS/[facility])    COVID-19    Acute respiratory failure with hypoxia (CMS/HCC)    Acute pulmonary embolism (CMS/HCC)    Chronic migraine    MVP (mitral valve prolapse)    Bilateral pneumonia    Lactic acidosis    Elevated transaminase level    DEBRA (acute kidney injury) (CMS/[facility])          Spoke with: Attempted to make contact with patient/caregiver for an initial transitions of care follow up call post discharge without success. I will reach out again at a later time. Any previously scheduled hospital follow up appointments noted below.       Discharge department/facility: [facility]     Scheduled appointment with PCP within 7-14 days    Follow Up  Future Appointments   Date Time Provider Amauri Escobedo   2021  1:00 PM MD NICOLA Kirk [facility]       [place], [place]

## 2021-10-14 ENCOUNTER — TELEPHONE (OUTPATIENT)
Dept: INTERNAL MEDICINE | Age: 46
End: 2021-10-14

## 2021-10-14 NOTE — TELEPHONE ENCOUNTER
Looks like she ihas been discharged from the hospital.  She is on dr Angela Mendoza schedule for 11/8. Unless eh specifically requested her she needs to follow up with me within two week for a 30 minute visit.

## 2021-10-14 NOTE — TELEPHONE ENCOUNTER
I spoke with patient's . He states he has not heard from home health. He was under the impression it would be [facility]. I have called and spoke with Cora Cummins at [facility]. She states she was set up with [facility]. I have spoke with [facility], they have her referral but are waiting for insurance approval. Going over her information with them, we found they had the incorrect insurance ID number. We have clarified that and they are resubmitting with insurance for approval and plan to have a nurse at their house tomorrow morning. I have let patient's , Rayne, know this information. He states Mrs. Stacie Howe is bed bound. She cannot stand. He is going to work on some leg exercises with her at home to try to build her strength. He states she is still in a lot of pain. She is drinking the slim fast protein shakes. He states she has been vomiting a clear liquid. He states he is caring for her and keeping her cleaned up. He has all of her medications and is giving them as directed. Her bowels and bladder are moving ok. I spoke with him in detail about their financial situation through this. He is currently off of work to care for her. I have advised him she may qualify for Medicaid to supplement her Medicare as there is no income at this time. I have emailed him an application and phone number to reach out. They may or may not qualify, but it is definitely worth checking. I have advised him we are here if there are any needs and he can reach out at any time. He will assist her with the video visit scheduled for next week.

## 2021-10-14 NOTE — TELEPHONE ENCOUNTER
Kevin 45 Transitions Initial Follow Up Call    Outreach made within 2 business days of discharge: Yes    Patient: Myesha Walton    Patient : 1975        MRN: [id number]   Reason for Admission: Admitted 21 for COVID-19  Discharge Date: 10/12/21  Final Discharge Diagnoses:    Sepsis, unspecified organism (CMS/[facility])    Cytokine release syndrome, grade 4    Hyperkalemia    Pelvic hematoma, female    Acute blood loss anemia    Ileus (CMS/[facility])    COVID-19    Acute respiratory failure with hypoxia (CMS/[facility])    Acute pulmonary embolism (CMS/HCC)    Chronic migraine    MVP (mitral valve prolapse)    Bilateral pneumonia    Lactic acidosis    Elevated transaminase level    DEBRA (acute kidney injury) (CMS/[facility])                       Spoke with: 2nd attempt to reach patient, no answer. I was unable to leave a message as patient's voicemail was full. I also called the number listed for patient's . I was able to leave a message on this voicemail for patient to call the office for a sooner hospital follow up visit.      Discharge department/facility: [facility]     Follow Up  Future Appointments   Date Time Provider Amauri Escobedo   2021  1:00 PM Jenn Cuellar MD [facility]       [place], [place]

## 2021-10-15 ENCOUNTER — NURSE TRIAGE (OUTPATIENT)
Dept: CALL CENTER | Facility: HOSPITAL | Age: 46
End: 2021-10-15

## 2021-10-15 ENCOUNTER — APPOINTMENT (OUTPATIENT)
Dept: CT IMAGING | Facility: HOSPITAL | Age: 46
End: 2021-10-15

## 2021-10-15 ENCOUNTER — HOSPITAL ENCOUNTER (EMERGENCY)
Facility: HOSPITAL | Age: 46
Discharge: ANOTHER HOSPITAL | End: 2021-10-16
Attending: FAMILY MEDICINE | Admitting: FAMILY MEDICINE

## 2021-10-15 DIAGNOSIS — N19 RENAL FAILURE, UNSPECIFIED CHRONICITY: ICD-10-CM

## 2021-10-15 DIAGNOSIS — N39.0 URINARY TRACT INFECTION WITHOUT HEMATURIA, SITE UNSPECIFIED: ICD-10-CM

## 2021-10-15 DIAGNOSIS — R18.8 INTRA-ABDOMINAL FLUID COLLECTION: ICD-10-CM

## 2021-10-15 DIAGNOSIS — R18.8 ABDOMINAL WALL FLUID COLLECTIONS: Primary | ICD-10-CM

## 2021-10-15 LAB
ALBUMIN SERPL-MCNC: 2.8 G/DL (ref 3.5–5.2)
ALBUMIN/GLOB SERPL: 0.7 G/DL
ALP SERPL-CCNC: 134 U/L (ref 39–117)
ALT SERPL W P-5'-P-CCNC: 12 U/L (ref 1–33)
ANION GAP SERPL CALCULATED.3IONS-SCNC: 19 MMOL/L (ref 5–15)
APTT PPP: 29.7 SECONDS (ref 24.1–35)
AST SERPL-CCNC: 13 U/L (ref 1–32)
BACTERIA UR QL AUTO: ABNORMAL /HPF
BILIRUB SERPL-MCNC: 0.5 MG/DL (ref 0–1.2)
BILIRUB UR QL STRIP: ABNORMAL
BUN SERPL-MCNC: 59 MG/DL (ref 6–20)
BUN/CREAT SERPL: 13.1 (ref 7–25)
CALCIUM SPEC-SCNC: 10.4 MG/DL (ref 8.6–10.5)
CHLORIDE SERPL-SCNC: 101 MMOL/L (ref 98–107)
CLARITY UR: ABNORMAL
CO2 SERPL-SCNC: 14 MMOL/L (ref 22–29)
COLOR UR: ABNORMAL
CREAT SERPL-MCNC: 4.49 MG/DL (ref 0.57–1)
D-LACTATE SERPL-SCNC: 2.6 MMOL/L (ref 0.5–2)
DEPRECATED RDW RBC AUTO: 52.2 FL (ref 37–54)
ERYTHROCYTE [DISTWIDTH] IN BLOOD BY AUTOMATED COUNT: 15.8 % (ref 12.3–15.4)
GFR SERPL CREATININE-BSD FRML MDRD: 11 ML/MIN/1.73
GFR SERPL CREATININE-BSD FRML MDRD: ABNORMAL ML/MIN/{1.73_M2}
GLOBULIN UR ELPH-MCNC: 3.8 GM/DL
GLUCOSE SERPL-MCNC: 136 MG/DL (ref 65–99)
GLUCOSE UR STRIP-MCNC: NEGATIVE MG/DL
HCT VFR BLD AUTO: 36.2 % (ref 34–46.6)
HGB BLD-MCNC: 11.4 G/DL (ref 12–15.9)
HGB UR QL STRIP.AUTO: ABNORMAL
HOLD SPECIMEN: NORMAL
HOLD SPECIMEN: NORMAL
HYALINE CASTS UR QL AUTO: ABNORMAL /LPF
INR PPP: 1.44 (ref 0.91–1.09)
KETONES UR QL STRIP: ABNORMAL
LEUKOCYTE ESTERASE UR QL STRIP.AUTO: ABNORMAL
LYMPHOCYTES # BLD MANUAL: 0.99 10*3/MM3 (ref 0.7–3.1)
LYMPHOCYTES NFR BLD MANUAL: 4 % (ref 19.6–45.3)
LYMPHOCYTES NFR BLD MANUAL: 4 % (ref 5–12)
MAGNESIUM SERPL-MCNC: 2.3 MG/DL (ref 1.6–2.6)
MCH RBC QN AUTO: 28.7 PG (ref 26.6–33)
MCHC RBC AUTO-ENTMCNC: 31.5 G/DL (ref 31.5–35.7)
MCV RBC AUTO: 91.2 FL (ref 79–97)
METAMYELOCYTES NFR BLD MANUAL: 3 % (ref 0–0)
MONOCYTES # BLD AUTO: 0.99 10*3/MM3 (ref 0.1–0.9)
MYELOCYTES NFR BLD MANUAL: 1 % (ref 0–0)
NEUTROPHILS # BLD AUTO: 21.82 10*3/MM3 (ref 1.7–7)
NEUTROPHILS NFR BLD MANUAL: 77 % (ref 42.7–76)
NEUTS BAND NFR BLD MANUAL: 11 % (ref 0–5)
NEUTS VAC BLD QL SMEAR: ABNORMAL
NITRITE UR QL STRIP: POSITIVE
NRBC BLD AUTO-RTO: 0.1 /100 WBC (ref 0–0.2)
PH UR STRIP.AUTO: 6.5 [PH] (ref 5–8)
PLATELET # BLD AUTO: 638 10*3/MM3 (ref 140–450)
PMV BLD AUTO: 9.9 FL (ref 6–12)
POIKILOCYTOSIS BLD QL SMEAR: ABNORMAL
POLYCHROMASIA BLD QL SMEAR: ABNORMAL
POTASSIUM SERPL-SCNC: 2.5 MMOL/L (ref 3.5–5.2)
PROT SERPL-MCNC: 6.6 G/DL (ref 6–8.5)
PROT UR QL STRIP: ABNORMAL
PROTHROMBIN TIME: 17 SECONDS (ref 11.9–14.6)
RBC # BLD AUTO: 3.97 10*6/MM3 (ref 3.77–5.28)
RBC # UR: ABNORMAL /HPF
REF LAB TEST METHOD: ABNORMAL
SARS-COV-2 RNA PNL SPEC NAA+PROBE: NOT DETECTED
SMALL PLATELETS BLD QL SMEAR: ABNORMAL
SODIUM SERPL-SCNC: 134 MMOL/L (ref 136–145)
SP GR UR STRIP: 1.02 (ref 1–1.03)
SQUAMOUS #/AREA URNS HPF: ABNORMAL /HPF
TOXIC GRANULATION: ABNORMAL
UROBILINOGEN UR QL STRIP: ABNORMAL
WBC # BLD AUTO: 24.8 10*3/MM3 (ref 3.4–10.8)
WBC UR QL AUTO: ABNORMAL /HPF
YEAST URNS QL MICRO: ABNORMAL /HPF

## 2021-10-15 PROCEDURE — 87635 SARS-COV-2 COVID-19 AMP PRB: CPT | Performed by: FAMILY MEDICINE

## 2021-10-15 PROCEDURE — 85025 COMPLETE CBC W/AUTO DIFF WBC: CPT | Performed by: FAMILY MEDICINE

## 2021-10-15 PROCEDURE — 85730 THROMBOPLASTIN TIME PARTIAL: CPT | Performed by: FAMILY MEDICINE

## 2021-10-15 PROCEDURE — 96367 TX/PROPH/DG ADDL SEQ IV INF: CPT

## 2021-10-15 PROCEDURE — 80053 COMPREHEN METABOLIC PANEL: CPT | Performed by: FAMILY MEDICINE

## 2021-10-15 PROCEDURE — 83735 ASSAY OF MAGNESIUM: CPT | Performed by: FAMILY MEDICINE

## 2021-10-15 PROCEDURE — 74176 CT ABD & PELVIS W/O CONTRAST: CPT

## 2021-10-15 PROCEDURE — 99284 EMERGENCY DEPT VISIT MOD MDM: CPT

## 2021-10-15 PROCEDURE — 85610 PROTHROMBIN TIME: CPT | Performed by: FAMILY MEDICINE

## 2021-10-15 PROCEDURE — 83605 ASSAY OF LACTIC ACID: CPT | Performed by: FAMILY MEDICINE

## 2021-10-15 PROCEDURE — 81001 URINALYSIS AUTO W/SCOPE: CPT | Performed by: FAMILY MEDICINE

## 2021-10-15 PROCEDURE — 87186 SC STD MICRODIL/AGAR DIL: CPT | Performed by: FAMILY MEDICINE

## 2021-10-15 PROCEDURE — 51702 INSERT TEMP BLADDER CATH: CPT

## 2021-10-15 PROCEDURE — 25010000002 LEVOFLOXACIN PER 250 MG: Performed by: FAMILY MEDICINE

## 2021-10-15 PROCEDURE — 87077 CULTURE AEROBIC IDENTIFY: CPT | Performed by: FAMILY MEDICINE

## 2021-10-15 PROCEDURE — 87150 DNA/RNA AMPLIFIED PROBE: CPT | Performed by: FAMILY MEDICINE

## 2021-10-15 PROCEDURE — 96365 THER/PROPH/DIAG IV INF INIT: CPT

## 2021-10-15 PROCEDURE — 85007 BL SMEAR W/DIFF WBC COUNT: CPT | Performed by: FAMILY MEDICINE

## 2021-10-15 PROCEDURE — 25010000002 POTASSIUM CHLORIDE PER 2 MEQ: Performed by: FAMILY MEDICINE

## 2021-10-15 PROCEDURE — 87040 BLOOD CULTURE FOR BACTERIA: CPT | Performed by: FAMILY MEDICINE

## 2021-10-15 PROCEDURE — 87086 URINE CULTURE/COLONY COUNT: CPT | Performed by: FAMILY MEDICINE

## 2021-10-15 RX ORDER — POTASSIUM CHLORIDE 14.9 MG/ML
20 INJECTION INTRAVENOUS ONCE
Status: COMPLETED | OUTPATIENT
Start: 2021-10-15 | End: 2021-10-16

## 2021-10-15 RX ORDER — SODIUM CHLORIDE 9 MG/ML
125 INJECTION, SOLUTION INTRAVENOUS CONTINUOUS
Status: DISCONTINUED | OUTPATIENT
Start: 2021-10-15 | End: 2021-10-16 | Stop reason: HOSPADM

## 2021-10-15 RX ORDER — LEVOFLOXACIN 5 MG/ML
500 INJECTION, SOLUTION INTRAVENOUS ONCE
Status: COMPLETED | OUTPATIENT
Start: 2021-10-15 | End: 2021-10-15

## 2021-10-15 RX ADMIN — SODIUM CHLORIDE 500 ML: 9 INJECTION, SOLUTION INTRAVENOUS at 23:03

## 2021-10-15 RX ADMIN — POTASSIUM CHLORIDE 20 MEQ: 14.9 INJECTION, SOLUTION INTRAVENOUS at 23:03

## 2021-10-15 RX ADMIN — LEVOFLOXACIN 500 MG: 5 INJECTION, SOLUTION INTRAVENOUS at 22:02

## 2021-10-15 NOTE — TELEPHONE ENCOUNTER
She was discharged 10/06/2021, today she has not urinated since last night has been hypothermic,  says, she may not want to go,told him, she  Needs to go, she is not eating or drinking well, and her temp was 94 heated her up to 97, he said, was not sure if thermometer was working, well, told him to take her in.     Reason for Disposition  • [1] Decreased urination and [2] drinking very little AND [2] dehydration suspected (e.g., dark urine, no urine > 12 hours, very dry mouth, very lightheaded)    Additional Information  • Negative: Shock suspected (e.g., cold/pale/clammy skin, too weak to stand, low BP, rapid pulse)  • Negative: Sounds like a life-threatening emergency to the triager  • Negative: Followed a female genital area injury (e.g., vagina, vulva)  • Negative: Followed a male genital area injury (e.g., penis, scrotum)  • Negative: Vaginal discharge  • Negative: Pus (white, yellow) or bloody discharge from end of penis  • Negative: [1] Taking antibiotic for urinary tract infection (UTI) AND [2] female  • Negative: [1] Taking antibiotic for urinary tract infection (UTI) AND [2] male  • Negative: [1] Discomfort (pain, burning or stinging) when passing urine AND [2] pregnant  • Negative: [1] Discomfort (pain, burning or stinging) when passing urine AND [2] postpartum (from 0 to 6 weeks after delivery)  • Negative: [1] Discomfort (pain, burning or stinging) when passing urine AND [2] female  • Negative: [1] Discomfort (pain, burning or stinging) when passing urine AND [2] male  • Negative: Pain or itching in the vulvar area  • Negative: Pain in scrotum is main symptom  • Negative: Blood in the urine is main symptom  • Negative: Symptoms arising from use of a urinary catheter (Mojica or Coude)  • Negative: [1] Unable to urinate (or only a few drops) > 4 hours AND [2] bladder feels very full (e.g., palpable bladder or strong urge to urinate)  • Negative: Patient sounds very sick or weak to the  "triager    Answer Assessment - Initial Assessment Questions  1. SYMPTOM: \"What's the main symptom you're concerned about?\" (e.g., frequency, incontinence)      Cannot urinated feels like can, but cannot  2. ONSET: \"When did the  Non urinating  start?\"      Not since yesterday  3. PAIN: \"Is there any pain?\" If Yes, ask: \"How bad is it?\" (Scale: 1-10; mild, moderate, severe)      Feels full  4. CAUSE: \"What do you think is causing the symptoms?\"      Hematoma in lower abd  5. OTHER SYMPTOMS: \"Do you have any other symptoms?\" (e.g., fever, flank pain, blood in urine, pain with urination)      Fever/cold, no urine,   6. PREGNANCY: \"Is there any chance you are pregnant?\" \"When was your last menstrual period?\"      no    Protocols used: URINARY SYMPTOMS-ADULT-AH      "

## 2021-10-15 NOTE — TELEPHONE ENCOUNTER
Thanks for doing all of this [facility]. I know they are very appreciative of the working done for them.

## 2021-10-16 VITALS
SYSTOLIC BLOOD PRESSURE: 121 MMHG | HEIGHT: 67 IN | DIASTOLIC BLOOD PRESSURE: 73 MMHG | HEART RATE: 88 BPM | WEIGHT: 195 LBS | BODY MASS INDEX: 30.61 KG/M2 | RESPIRATION RATE: 20 BRPM | TEMPERATURE: 97.9 F | OXYGEN SATURATION: 100 %

## 2021-10-16 LAB
BACTERIA BLD CULT: ABNORMAL
BOTTLE TYPE: ABNORMAL
HOLD SPECIMEN: NORMAL

## 2021-10-16 PROCEDURE — 25010000002 VANCOMYCIN 10 G RECONSTITUTED SOLUTION: Performed by: FAMILY MEDICINE

## 2021-10-16 PROCEDURE — 96367 TX/PROPH/DG ADDL SEQ IV INF: CPT

## 2021-10-16 PROCEDURE — 96366 THER/PROPH/DIAG IV INF ADDON: CPT

## 2021-10-16 RX ADMIN — VANCOMYCIN HYDROCHLORIDE 1750 MG: 10 INJECTION, POWDER, LYOPHILIZED, FOR SOLUTION INTRAVENOUS at 00:34

## 2021-10-16 RX ADMIN — SODIUM CHLORIDE 500 ML: 0.9 INJECTION, SOLUTION INTRAVENOUS at 00:22

## 2021-10-16 RX ADMIN — SODIUM CHLORIDE 125 ML/HR: 9 INJECTION, SOLUTION INTRAVENOUS at 01:50

## 2021-10-16 NOTE — ED NOTES
Pt has been accepted to [facility] by Dr. Isiah Carter, RN at transfer center.  Nurse can call report to [phone].     Vianney Martinez  10/16/21 0108

## 2021-10-16 NOTE — ED PROVIDER NOTES
Subjective   Ms. Berumen is a 46-year-old female with a very complex medical history. She was infected with COVID-19 in August of this year, came into the hospital hypoxemic requiring admission. She also developed a pulmonary embolus and was placed on anticoagulation. Unfortunately she developed abdominal hematoma due to that which caused obstruction of the ureters and ultimately kidney failure which required dialysis. It was felt that the time that surgical intervention for the hematoma was inappropriate and eventually she was transferred to our LTAC, from which she was discharged just 3 days ago. At home she reports that she has not been able to urinate for the entire time she was there. She has had multiple episodes of liquid diarrhea however. Her presentation tonight was ostensibly because of the urinary retention. She does not think she has had a fever. She continues to have severe abdominal distention. She has nausea and intermittent episodes of vomiting          Review of Systems   Gastrointestinal: Positive for abdominal distention, abdominal pain and diarrhea.   Genitourinary: Positive for difficulty urinating.   All other systems reviewed and are negative.      Past Medical History:   Diagnosis Date   • Angina at rest (CMS/HCC)    • Migraine     Sees Pain Management for injections.    • MVP (mitral valve prolapse)    • Syncope        Allergies   Allergen Reactions   • Coconut Unknown - High Severity   • Nuts Unknown - High Severity   • Penicillins        Past Surgical History:   Procedure Laterality Date   • BREAST BIOPSY Right 2011    benign   • INSERTION HEMODIALYSIS CATHETER Left 9/16/2021    Procedure: HEMODIALYSIS CATHETER INSERTION;  Surgeon: Anders Kaur MD;  Location: [place] [address];  Service: Vascular;  Laterality: Left;   • TONSILLECTOMY         Family History   Problem Relation Age of Onset   • Colon cancer Maternal Aunt 52   • Fibromyalgia Mother    • Heart disease Mother    •  Hypertension Mother    • Hodgkin's lymphoma Paternal Grandmother    • Ovarian cancer Neg Hx    • Breast cancer Neg Hx        Social History     Socioeconomic History   • Marital status:    Tobacco Use   • Smoking status: Never Smoker   • Smokeless tobacco: Never Used   Substance and Sexual Activity   • Alcohol use: No   • Drug use: No           Objective   Physical Exam  Vitals and nursing note reviewed.   Constitutional:       Appearance: She is well-developed. She is ill-appearing.   HENT:      Head: Normocephalic and atraumatic.      Right Ear: External ear normal.      Left Ear: External ear normal.      Nose: Nose normal.      Mouth/Throat:      Mouth: Mucous membranes are moist.      Pharynx: Oropharynx is clear.   Eyes:      Conjunctiva/sclera: Conjunctivae normal.   Cardiovascular:      Rate and Rhythm: Regular rhythm. Tachycardia present.      Heart sounds: Normal heart sounds.   Pulmonary:      Effort: Pulmonary effort is normal.      Breath sounds: Normal breath sounds.   Abdominal:      General: There is distension.      Tenderness: There is abdominal tenderness. There is rebound.   Musculoskeletal:         General: Normal range of motion.      Cervical back: Normal range of motion and neck supple.   Skin:     General: Skin is warm and dry.      Capillary Refill: Capillary refill takes less than 2 seconds.   Neurological:      General: No focal deficit present.      Mental Status: She is alert and oriented to person, place, and time.   Psychiatric:         Behavior: Behavior normal.         Thought Content: Thought content normal.         Judgment: Judgment normal.         Procedures           ED Course  ED Course as of 10/16/21 0117   Sat Oct 16, 2021   0026 eGFR Non  Am(!): 11 [NB]      ED Course User Index  [NB] Mian Trinh MD                                           MDM  Number of Diagnoses or Management Options     Amount and/or Complexity of Data Reviewed  Clinical lab  tests: reviewed and ordered  Tests in the radiology section of CPT®: ordered and reviewed  Decide to obtain previous medical records or to obtain history from someone other than the patient: yes    Patient Progress  Patient progress: stable      Final diagnoses:   Abdominal wall fluid collections   Renal failure, unspecified chronicity   Urinary tract infection without hematuria, site unspecified   Intra-abdominal fluid collection       ED Disposition  ED Disposition     ED Disposition Condition Comment    Transfer to Another Facility             No follow-up provider specified.       Medication List      No changes were made to your prescriptions during this visit.       The patient's work-up revealed a recurrence of her kidney failure with a creatinine well over 4. This is probably due to obstruction as her hematoma is closing hydroureteronephrosis bilaterally. Is also causing severe colonic distention but the gas pattern was not 1 of obstruction according to radiology.    Regarding the abdominal hematoma it appears now to have some gas involved and different ages of blood indicating possible infection/rebleeds. There is also a new collection in the abdominal wall again with gas contained within suggesting infection.    The patient's urine was milked chocolate in appearance and looks very infected on the urinalysis.    Antibiotics were started in the ED after cultures were taken.    I discussed the case with Dr. Barton (general surgery) who felt that this was beyond the scope of our ability to deal with the complications the patient is manifesting currently. I discussed the case with the transfer center at [facility] and they kindly agreed to admit the patient under their care. I discussed the case with Dr. Ro ([facility]) who kindly agreed to accept the ER to ER transfer. The patient is currently stable in the ED.         Mian Trinh MD  10/16/21 0117

## 2021-10-18 LAB — BACTERIA SPEC AEROBE CULT: ABNORMAL

## 2021-10-19 LAB
BACTERIA SPEC AEROBE CULT: ABNORMAL
BACTERIA SPEC AEROBE CULT: ABNORMAL
GRAM STN SPEC: ABNORMAL
ISOLATED FROM: ABNORMAL
ISOLATED FROM: ABNORMAL

## 2021-12-09 ENCOUNTER — TELEPHONE (OUTPATIENT)
Dept: INTERNAL MEDICINE | Age: 46
End: 2021-12-09

## 2021-12-09 NOTE — TELEPHONE ENCOUNTER
Mrs. Gayatri Kirkpatrick is still admitted to [facility]. I called today to speak with her  and check on her. He states she is improving some. She was able to sit up in a chair yesterday and today. She still has a long road ahead of her. He states he returned to work. He is going down to see her as often as he can. I let him know we are still following her admission and thinking of her. I just wanted to pass along an update.

## 2021-12-27 ENCOUNTER — TELEPHONE (OUTPATIENT)
Dept: INTERNAL MEDICINE | Age: 46
End: 2021-12-27

## 2021-12-27 NOTE — TELEPHONE ENCOUNTER
SKILLED NURSING FACILITY:   INITIAL CALL POST-HOSPITAL DISCHARGE    SNF: [id number] [place]    PHONE NUMBER: [phone]    THERAPY: PT/OT/ST     ANTICIPATED LENGTH OF STAY: unknown    Mrs. Leyda Crandall was admitted [address] [place] [address] for short term rehab following discharge from [facility]. She will continue therapy and wound care there. She has a dobbins in place. The GERRY drain and wound vac were d/c at discharge. She has bilateral nephrostomy tubes. She has not been up out of bed since she was admitted to [address] [place] [address]. Estimated length of stay is unknown at this time.

## 2022-01-07 ENCOUNTER — TELEPHONE (OUTPATIENT)
Dept: INTERNAL MEDICINE | Age: 47
End: 2022-01-07

## 2022-01-07 NOTE — TELEPHONE ENCOUNTER
[facility]    I spoke with Ana Sinha,  at [facility]. She states Mrs. Leilani Montemayor is still working with therapy. She was up to the chair on 1/5/22 but took a look of motivating to get back up. She was supposed to have a care plan meeting today, however that was rescheduled to next Friday related to the weather. She currently has some lab abnormalities. They are in contact with her specialist in [place] regarding this. There are no plans for discharge at this time. I will check back in next week.

## 2022-01-13 ENCOUNTER — TELEPHONE (OUTPATIENT)
Dept: INTERNAL MEDICINE | Age: 47
End: 2022-01-13

## 2022-01-13 NOTE — TELEPHONE ENCOUNTER
[facility]    I spoke with Yoly Antunez,  from [address] [place] [address]. She states Mrs. Balaji Downs is not wanting to participate in therapy. She is not progressing at this point. Insurance will only continue to pay if she is progressing. There is a meeting scheduled tomorrow with patient's  to discuss next steps. At this point long term care is being considered. There are no definite plans for discharge at this time.

## 2022-01-20 ENCOUNTER — TELEPHONE (OUTPATIENT)
Dept: INTERNAL MEDICINE | Age: 47
End: 2022-01-20

## 2022-01-20 NOTE — TELEPHONE ENCOUNTER
Per Jennifer Ely at [address] [place] [address], Lamonte Mckee was taken back to [facility] on 1/18/22 when one of her nephrostomy tubes came out. She is currently admitted to [facility]. I will continue to follow for TCM.

## 2022-01-26 ENCOUNTER — TELEPHONE (OUTPATIENT)
Dept: INTERNAL MEDICINE | Age: 47
End: 2022-01-26

## 2022-01-26 NOTE — TELEPHONE ENCOUNTER
[facility]    Mrs. Reta Garvey was discharged back to [facility] on 1/25/22 for short term rehab following admission to [facility]. She will resume therapy. Estimated length of stay is unknown at this time.

## 2022-02-04 ENCOUNTER — TELEPHONE (OUTPATIENT)
Dept: INTERNAL MEDICINE | Age: 47
End: 2022-02-04

## 2022-02-04 NOTE — TELEPHONE ENCOUNTER
[address] [facility]     Per Rishabh Gurrola at [facility], Mrs. Jannie Wright is still admitted. She is not sure of any plans for discharge. Wilian Zayas, the  is out of the office today. I will check back again next week.

## 2022-02-18 ENCOUNTER — TELEPHONE (OUTPATIENT)
Dept: INTERNAL MEDICINE | Age: 47
End: 2022-02-18

## 2022-02-18 NOTE — TELEPHONE ENCOUNTER
[facility]    Per Gerri,  at [facility], Mrs. Geneva Morales is still admitted to their facility. She is not making enough progress with therapy. She did stand yesterday for just under one minute, but is not wanting to participate with therapy. They have requested an evaluate by psych and neurology. She is approved to stay through April 1st, but will need to be walking to be able to discharge home. She states Mrs. Geneva Morales feels confident she will be able to walk by then. I will continue to check in for updates on her condition and admission status.

## 2022-03-04 ENCOUNTER — TELEPHONE (OUTPATIENT)
Dept: INTERNAL MEDICINE | Age: 47
End: 2022-03-04

## 2022-03-04 NOTE — TELEPHONE ENCOUNTER
[address]    I spoke with Tristan Centeno,  at [place] [address] [employer]. She states Mrs. Yahaira Wheatley is working with therapy, she is making minimal progress. She is standing for short periods of time. She is approved to stay through April 1 as long as she continues to participate in therapy. She is hoping to be able to discharge back home at that time.

## 2022-03-10 ENCOUNTER — TELEPHONE (OUTPATIENT)
Dept: INTERNAL MEDICINE | Age: 47
End: 2022-03-10

## 2022-03-10 NOTE — TELEPHONE ENCOUNTER
Per Allegra Arias at [facility], Mrs. Georgia Castañeda was taken to [facility] yesterday with complaints of nausea and vomiting. They admitted her for hyponatremia. She should return back to [facility] at discharge.

## 2022-03-16 ENCOUNTER — TELEPHONE (OUTPATIENT)
Dept: INTERNAL MEDICINE | Age: 47
End: 2022-03-16

## 2022-03-16 RX ORDER — RIZATRIPTAN BENZOATE 10 MG/1
10 TABLET ORAL
Qty: 9 TABLET | Refills: 4 | Status: SHIPPED | OUTPATIENT
Start: 2022-03-16 | End: 2022-03-16

## 2022-03-16 NOTE — TELEPHONE ENCOUNTER
Dianastewart Sabino called to request a refill on her medication.       Last office visit : 8/11/2020   Next office visit : 3/22/2022     Requested Prescriptions     Signed Prescriptions Disp Refills    rizatriptan (MAXALT) 10 MG tablet 9 tablet 4     Sig: Take 1 tablet by mouth once as needed for Migraine May repeat in 2 hours if needed     Authorizing Provider: Danni Danile     Ordering User: John Clrake

## 2022-03-16 NOTE — TELEPHONE ENCOUNTER
[facility] Initial Follow Up Call    Outreach made within 2 business days of discharge: Yes    Patient: Jeanine Browning   Patient : 1975  MRN: [id number]   Reason for Admission: Admitted 2021 to [facility] for covid-19, then transferred to [facility], then to [facility] 10/16/22-22, then to [facility] for short term rehab  Discharge Date: 3/15/22  Final Discharge Diagnoses:   Sepsis, unspecified organism (CMS/HCC)    Cytokine release syndrome, grade 4    Hyperkalemia    Pelvic hematoma, female    Acute blood loss anemia    Ileus (CMS/HCC)    COVID-19    Acute respiratory failure with hypoxia (CMS/HCC)    Acute pulmonary embolism (CMS/HCC)    Chronic migraine    MVP (mitral valve prolapse)    Bilateral pneumonia    Lactic acidosis    Elevated transaminase level    DEBRA (acute kidney injury) (CMS/[facility])    [place] Discharge:   Necrotizing soft tissue infection  Active Problems:  Bladder rupture  Bladder leak  Pain  MDD (major depressive disorder)  Nausea  Palliative care by specialist  Resolved Problems:  Ileus (CMS/[facility])    Spoke with: attempted to reach patient, her phone goes straight to voicemail. I left a message for her , Rayne, to call the office. Discharge department/facility: [facility]     [facility], Mrs. Ankit Ferrer was discharged with [facility]. A kyle lift was delivered to the home yesterday. I will reach out again later today to speak with the patient.      Scheduled appointment with PCP within 7-14 days    Follow Up  Future Appointments   Date Time Provider Amauri Escobedo   3/22/2022 10:45 AM Kurt Spence MD [facility], [facility] N[address] Russell

## 2022-03-22 ENCOUNTER — TELEMEDICINE (OUTPATIENT)
Dept: FAMILY MEDICINE CLINIC | Age: 47
End: 2022-03-22
Payer: COMMERCIAL

## 2022-03-22 DIAGNOSIS — F41.8 DEPRESSION WITH ANXIETY: ICD-10-CM

## 2022-03-22 DIAGNOSIS — R11.2 INTRACTABLE NAUSEA AND VOMITING: Primary | ICD-10-CM

## 2022-03-22 DIAGNOSIS — J30.89 CHRONIC NONSEASONAL ALLERGIC RHINITIS DUE TO POLLEN: ICD-10-CM

## 2022-03-22 DIAGNOSIS — F51.01 PRIMARY INSOMNIA: ICD-10-CM

## 2022-03-22 DIAGNOSIS — G89.29 CHRONIC MIDLINE LOW BACK PAIN WITHOUT SCIATICA: ICD-10-CM

## 2022-03-22 DIAGNOSIS — M54.50 CHRONIC MIDLINE LOW BACK PAIN WITHOUT SCIATICA: ICD-10-CM

## 2022-03-22 DIAGNOSIS — R63.0 DECREASED APPETITE: ICD-10-CM

## 2022-03-22 DIAGNOSIS — M62.838 MUSCLE SPASM: ICD-10-CM

## 2022-03-22 DIAGNOSIS — I26.99 OTHER PULMONARY EMBOLISM WITHOUT ACUTE COR PULMONALE, UNSPECIFIED CHRONICITY (HCC): ICD-10-CM

## 2022-03-22 PROCEDURE — 99214 OFFICE O/P EST MOD 30 MIN: CPT | Performed by: FAMILY MEDICINE

## 2022-03-22 PROCEDURE — G8428 CUR MEDS NOT DOCUMENT: HCPCS | Performed by: FAMILY MEDICINE

## 2022-03-22 RX ORDER — PROCHLORPERAZINE MALEATE 10 MG
10 TABLET ORAL EVERY 8 HOURS PRN
Qty: 90 TABLET | Refills: 1 | Status: SHIPPED | OUTPATIENT
Start: 2022-03-22 | End: 2022-03-25

## 2022-03-22 RX ORDER — ESZOPICLONE 3 MG/1
3 TABLET, FILM COATED ORAL NIGHTLY
Qty: 30 TABLET | Refills: 5 | Status: SHIPPED | OUTPATIENT
Start: 2022-03-22 | End: 2022-04-22

## 2022-03-22 RX ORDER — MEGESTROL ACETATE 40 MG/ML
400 SUSPENSION ORAL DAILY
Qty: 480 ML | Refills: 1 | Status: SHIPPED | OUTPATIENT
Start: 2022-03-22

## 2022-03-22 RX ORDER — METHOCARBAMOL 500 MG/1
500 TABLET, FILM COATED ORAL 2 TIMES DAILY PRN
Qty: 60 TABLET | Refills: 5 | Status: SHIPPED | OUTPATIENT
Start: 2022-03-22 | End: 2022-04-21

## 2022-03-22 RX ORDER — FLUOXETINE HYDROCHLORIDE 40 MG/1
40 CAPSULE ORAL DAILY
Qty: 90 CAPSULE | Refills: 3 | Status: SHIPPED | OUTPATIENT
Start: 2022-03-22

## 2022-03-22 NOTE — PROGRESS NOTES
[facility] PHYSICIAN SERVICES  [facility]  [address] [facility] [address] [address]  Dept: [phone]  Dept Fax: [phone]: [phone]    Vivienne Bamberger is a 42 y.o. female who presents today for her medical conditions/complaints as noted below. Vivienne Bamberger is here for No chief complaint on file. HPI:   CC: Here today to discuss the following:    Hospitalized in August 2021 with respiratory failure and pulmonary embolism requiring therapeutic anticoagulation. She developed a large pelvic hematoma which was managed conservatively until admission to [facility] with pelvic abscess and concern for bladder injury on October 2021. She was taken to the operating room and she was found to have a pelvic abscess with necrotizing infection involving the left side of the bladder. The left side of the bladder was necrotic and the ureter was not visible. She had a cystorrhaphy of her remaining bladder but she continued to have bladder leakage postoperatively. Bilateral percutaneous nephrostomy tubes were placed with normal ureters on anterograde urography. She developed necrotizing infection of her abdominal wall and subsequently was on the EGS service until December 2021. She required multiple debridements and complex care. Several cystograms remain positive for leaks nephrostomy and [place] were maintained. She was readmitted with concern for her right nephrostomy not draining. Interventional radiology was able to get bilateral nephroureteral stents down and anterograde nephrostogram showing a left ureteral leak distally. Cystogram was negative. At her visit in February with the urologist, her PCN U-tube was removed on the right. They suspected the PCN you across the left distal ureter will heal with time.   They discussed repeat antegrade nephrostogram versus evaluation in the OR with retrograde and ureteroscopy with possible stricture dilation/incision at the time. They were going to follow-up with her in 6 to 8 weeks. Based on most recent records from February 14: Mitral valve disease  Neurocardiogenic syncope  Migraine without status migrainosus  Insomnia  Occipital neuralgia  Cervicogenic headache  Myofascial pain syndrome  Vitamin B12 deficiency  Vasovagal syncope  Fatigue  Familial hypercholesterolemia  Anxiety  Hypertension          Nephrostomy tube:      [place] discharge medication list as follows:    Eliquis 5 mg twice daily \"continues to take  Furosemide 20 mg daily: No longer taking  Methocarbamol 500 mg twice daily: Continues to take  Oxycodone 5 mg every 8 hours as needed: No longer taking    Acetaminophen 500 mg every 6 hours as needed: Continues to take  Fluoxetine 40 mg daily: Continues to take  Hydroxyzine 25 mg every 6 hours as needed: Continues to take  Nexium 40 mg daily: Continues to take  Trazodone 100 mg nightly: Continues to take    Stopped medications:  Lovenox  Bisacodyl  Glucagon  Heparin  Humulin R  Lidocaine patch  Maalox  Metoclopramide  Metoprolol 25 mg twice daily  Midodrine  Zofran  Potassium chloride  Spironolactone 50 mg      Established with [place] kidney [address]    Pertinent imaging and laboratory studies: January 2022  CT of abdomen and pelvis:  1. Interval retraction of the left nephrostomy tube with pigtail in the lower pole cortex. New subcapsular collection along the posterior left kidney possible hematoma. Mild hydroureteronephrosis and urethral thickening of the renal pelvis and ureter. 2. Interval removal of the right nephrostomy tube. Mild urethral enhancement of the renal pelvis. No hydronephrosis. 3. Decreased small bilateral pleural effusions. CT cystogram  1. No extravasation of contrast of the urinary bladder to suggest leak  2.  Dilation of the left ureter which is narrowed just proximal to the UVJ  3. Small amount of free fluid in the pelvis which does not contain contrast    HPI    Subjective:      Review of Systems   Constitutional: Positive for activity change, appetite change and fatigue. Negative for chills and fever. HENT: Negative for congestion, ear discharge, ear pain, mouth sores, nosebleeds, postnasal drip, rhinorrhea, sinus pressure, sinus pain and sore throat. Respiratory: Negative for cough, chest tightness and shortness of breath. Cardiovascular: Negative for chest pain, palpitations and leg swelling. Gastrointestinal: Positive for nausea and vomiting. Negative for abdominal pain, anal bleeding, constipation and diarrhea. Genitourinary: Negative for difficulty urinating, dysuria, enuresis, flank pain and frequency. Psychiatric/Behavioral: Negative. SeeHPI for visit specific review of symptoms. All others negative      Objective: There were no vitals taken for this visit. Physical Exam      No results found for this or any previous visit (from the past 672 hour(s)). Assessment & Plan: The following diagnoses and conditions are stable with no further orders unless indicated: There are no diagnoses linked to this encounter. ***   *** 1. Compazine. thenreglan        2. No follow-ups on file. Discussed use, benefit, and side effects of prescribed medications. All patient questions answered. Pt voiced understanding. Reviewed health maintenance. Instructedto continue current medications, diet and exercise. Patient agreed with treatmentplan.  Follow up as directed.     _______________________________________________________________      Past Medical History:   Diagnosis Date    Blurred vision     due to BP problems    Breast cyst, right     Chronic sinusitis     GERD (gastroesophageal reflux disease)     Headache(784.0)     migraines    Hypertension     MVP (mitral valve prolapse)     Neurocardiogenic syncope     Syncope, cardiogenic     Varicose veins     Wears glasses       Past Surgical History:   Procedure Laterality Date    BREAST BIOPSY  12/2/2011    US guided right, benign fibroadenoma    TONSILLECTOMY AND ADENOIDECTOMY         Family History   Problem Relation Age of Onset    Other Mother         hypoglycemia    Heart Failure Mother     Heart Disease Mother     Cancer Paternal Grandmother         Hodgkin's    Breast Cancer Paternal Grandmother        Social History     Tobacco Use    Smoking status: Never Smoker    Smokeless tobacco: Never Used   Substance Use Topics    Alcohol use: Yes     Comment: rare     Current Outpatient Medications   Medication Sig Dispense Refill    rizatriptan (MAXALT) 10 MG tablet Take 1 tablet by mouth once as needed for Migraine May repeat in 2 hours if needed 9 tablet 4    levonorgestrel-ethinyl estradiol (SEASONALE) 0.15-0.03 MG per tablet TAKE 1 TABLET BY MOUTH DAILY 91 tablet 0    traZODone (DESYREL) 50 MG tablet Take 1-2 tablets at bedtime as need for sleep 180 tablet 2    carvedilol (COREG) 6.25 MG tablet Take 1 tablet by mouth 2 times daily 180 tablet 3    eszopiclone (LUNESTA) 3 MG TABS TAKE 1 TABLET BY MOUTH EVERY NIGHT 30 tablet 5    FLUoxetine (PROZAC) 40 MG capsule Take 1 capsule by mouth daily 90 capsule 3    hydrOXYzine (VISTARIL) 25 MG capsule Take 1 capsule by mouth 3 times daily as needed for Anxiety 270 capsule 2    tiZANidine (ZANAFLEX) 4 MG capsule TAKE 1 CAPSULE BY MOUTH EVERY 12 HOURS AS NEEDED FOR HEADACHE 180 capsule 2    ondansetron (ZOFRAN) 4 MG tablet TAKE 1 TABLET BY MOUTH EVERY 8 HOURS AS NEEDED FOR NAUSEA 20 tablet 5    fluticasone (FLONASE) 50 MCG/ACT nasal spray 2 sprays by Each Nostril route daily 1 Bottle 5    butalbital-acetaminophen-caffeine (FIORICET, ESGIC) -40 MG per tablet Take 1 tablet by mouth as needed      Erenumab-aooe (AIMOVIG 140 DOSE) 70 MG/ML SOAJ       Bupivacaine HCl (LOCAL NERVE BLOCK SYRINGE, WITHOUT EPINEPHRINE,) 30 mLs by Infiltration route every 3 months Indications: Occipital nerve block shot      Alfalfa 500 MG TABS Take by mouth      Oxymetazoline HCl (AFRIN 12 HOUR NA) by Nasal route as needed       No current facility-administered medications for this visit. Allergies   Allergen Reactions    Penicillins Other (See Comments)     Skin peeling off in insides of hands       Health Maintenance   Topic Date Due    Hepatitis C screen  Never done    COVID-19 Vaccine (1) Never done    Depression Monitoring  Never done    DTaP/Tdap/Td vaccine (1 - Tdap) Never done    Potassium monitoring  04/25/2020    Creatinine monitoring  04/25/2020    Flu vaccine (1) 09/01/2021    Lipid screen  04/25/2024    Cervical cancer screen  06/04/2025    HIV screen  Completed    Hepatitis A vaccine  Aged Out    Hepatitis B vaccine  Aged Out    Hib vaccine  Aged Out    Meningococcal (ACWY) vaccine  Aged Out    Pneumococcal 0-64 years Vaccine  Aged Out       _______________________________________________________________    Note dictated using [id number] [facility]  Sometimes this dictation software makes erroneous transcriptions.

## 2022-03-24 ENCOUNTER — TELEPHONE (OUTPATIENT)
Dept: FAMILY MEDICINE CLINIC | Age: 47
End: 2022-03-24

## 2022-03-24 NOTE — TELEPHONE ENCOUNTER
----- Message from Adria Katz sent at 3/24/2022 11:25 AM CDT -----  Subject: Message to Provider    QUESTIONS  Information for Provider? Patient was evaluated for physical therapy. Her   bp 137/100 pulse 126, still vomiting not moving at all in the bed. Please   call [facility] health physical therapist   [phone]  ---------------------------------------------------------------------------  --------------  Patricia CAMP  What is the best way for the office to contact you? OK to leave message on   voicemail  Preferred Call Back Phone Number? [phone]  ---------------------------------------------------------------------------  --------------  SCRIPT ANSWERS  Relationship to Patient?  Self

## 2022-03-25 RX ORDER — METOCLOPRAMIDE HYDROCHLORIDE 5 MG/5ML
5 SOLUTION ORAL
Qty: 473 ML | Refills: 2 | Status: SHIPPED | OUTPATIENT
Start: 2022-03-25

## 2022-03-27 ASSESSMENT — ENCOUNTER SYMPTOMS
NAUSEA: 1
CHEST TIGHTNESS: 0
SINUS PAIN: 0
SHORTNESS OF BREATH: 0
SORE THROAT: 0
RHINORRHEA: 0
ANAL BLEEDING: 0
CHOKING: 0
SINUS PRESSURE: 0
STRIDOR: 0
WHEEZING: 0
COUGH: 0
ABDOMINAL PAIN: 0
ABDOMINAL DISTENTION: 0
VOMITING: 1
RECTAL PAIN: 0
DIARRHEA: 0
CONSTIPATION: 0
BLOOD IN STOOL: 0

## 2022-03-27 NOTE — PROGRESS NOTES
3/22/2022    TELEHEALTH EVALUATION -- Audio/Visual (During BVIAS-74 public health emergency)    HPI:    Nataly Doe (:  1975) has requested an audio/video evaluation for the following concern(s):    Hospitalized in 2021 with respiratory failure and pulmonary embolism requiring therapeutic anticoagulation. She developed a large pelvic hematoma which was managed conservatively until admission to [facility] with pelvic abscess and concern for bladder injury on 2021. She was taken to the operating room and she was found to have a pelvic abscess with necrotizing infection involving the left side of the bladder. The left side of the bladder was necrotic and the ureter was not visible. She had a cystorrhaphy of her remaining bladder but she continued to have bladder leakage postoperatively. Bilateral percutaneous nephrostomy tubes were placed with normal ureters on anterograde urography. She developed necrotizing infection of her abdominal wall and subsequently was on the EGS service until 2021. She required multiple debridements and complex care. Several cystograms remain positive for leaks nephrostomy and [place] were maintained. She was readmitted with concern for her right nephrostomy not draining. Interventional radiology was able to get bilateral nephroureteral stents down and anterograde nephrostogram showing a left ureteral leak distally. Cystogram was negative. At her visit in February with the urologist, her PCN U-tube was removed on the right. They suspected the PCN you across the left distal ureter will heal with time. They discussed repeat antegrade nephrostogram versus evaluation in the OR with retrograde and ureteroscopy with possible stricture dilation/incision at the time. They were going to follow-up with her in 6 to 8 weeks. Based on most recent records from :     Mitral valve disease  Neurocardiogenic syncope  Migraine without status migrainosus  Insomnia  Occipital neuralgia  Cervicogenic headache  Myofascial pain syndrome  Vitamin B12 deficiency  Vasovagal syncope  Fatigue  Familial hypercholesterolemia  Anxiety  Hypertension          Nephrostomy tube:      [place] discharge medication list as follows:    Eliquis 5 mg twice daily \"continues to take  Furosemide 20 mg daily: No longer taking  Methocarbamol 500 mg twice daily: Continues to take  Oxycodone 5 mg every 8 hours as needed: No longer taking    Acetaminophen 500 mg every 6 hours as needed: Continues to take  Fluoxetine 40 mg daily: Continues to take  Hydroxyzine 25 mg every 6 hours as needed: Continues to take  Nexium 40 mg daily: Continues to take  Trazodone 100 mg nightly: Continues to take    Stopped medications:  Lovenox  Bisacodyl  Glucagon  Heparin  Humulin R  Lidocaine patch  Maalox  Metoclopramide  Metoprolol 25 mg twice daily  Midodrine  Zofran  Potassium chloride  Spironolactone 50 mg      Established with [place] kidney [facility]    Pertinent imaging and laboratory studies: January 2022  CT of abdomen and pelvis:  1. Interval retraction of the left nephrostomy tube with pigtail in the lower pole cortex. New subcapsular collection along the posterior left kidney possible hematoma. Mild hydroureteronephrosis and urethral thickening of the renal pelvis and ureter. 2. Interval removal of the right nephrostomy tube. Mild urethral enhancement of the renal pelvis. No hydronephrosis. 3. Decreased small bilateral pleural effusions. CT cystogram  1. No extravasation of contrast of the urinary bladder to suggest leak  2. Dilation of the left ureter which is narrowed just proximal to the UVJ  3. Small amount of free fluid in the pelvis which does not contain contrast      Review of Systems   Constitutional: Positive for activity change, appetite change and fatigue. Negative for chills, diaphoresis and fever. HENT: Negative for congestion. Respiratory: Negative for cough, choking, shortness of breath, wheezing and stridor. Cardiovascular: Negative for chest pain, palpitations and leg swelling. Gastrointestinal: Positive for nausea and vomiting. Negative for abdominal distention, abdominal pain, blood in stool, constipation, diarrhea and rectal pain. Genitourinary: Negative for dyspareunia, dysuria, enuresis, flank pain, frequency and genital sores. Prior to Visit Medications    Medication Sig Taking? Authorizing Provider   eszopiclone (LUNESTA) 3 MG TABS Take 1 tablet by mouth nightly for 31 days.  Yes Gwenevere Cheadle, MD   apixaban (ELIQUIS) 5 MG TABS tablet Take 1 tablet by mouth 2 times daily Yes Gwenevere Cheadle, MD   methocarbamol (ROBAXIN) 500 MG tablet Take 1 tablet by mouth 2 times daily as needed (Muscle spasms) Yes Gwenevere Cheadle, MD   megestrol (MEGACE) 40 MG/ML suspension Take 10 mLs by mouth daily Yes Gwenevere Cheadle, MD   FLUoxetine (PROZAC) 40 MG capsule Take 1 capsule by mouth daily Yes Gwenevere Cheadle, MD   metoclopramide (REGLAN) 10 MG/10ML SOLN Take 5 mLs by mouth 3 times daily (before meals)  Gwenevere Cheadle, MD   rizatriptan (MAXALT) 10 MG tablet Take 1 tablet by mouth once as needed for Migraine May repeat in 2 hours if needed  Gwenevere Cheadle, MD   levonorgestrel-ethinyl estradiol (SEASONALE) 0.15-0.03 MG per tablet TAKE 1 TABLET BY MOUTH DAILY  ALTA Caballero   traZODone (DESYREL) 50 MG tablet Take 1-2 tablets at bedtime as need for sleep  Gwenevere Cheadle, MD   hydrOXYzine (VISTARIL) 25 MG capsule Take 1 capsule by mouth 3 times daily as needed for Anxiety  ALTA Franz   tiZANidine (ZANAFLEX) 4 MG capsule TAKE 1 CAPSULE BY MOUTH EVERY 12 HOURS AS NEEDED FOR HEADACHE  ALTA Franz   ondansetron (ZOFRAN) 4 MG tablet TAKE 1 TABLET BY MOUTH EVERY 8 HOURS AS NEEDED FOR NAUSEA  ALTA Franz   fluticasone (FLONASE) 50 MCG/ACT nasal spray 2 sprays by Each Nostril route daily  ALTA Russ   butalbital-acetaminophen-caffeine (FIORICET, ESGIC) -40 MG per tablet Take 1 tablet by mouth as needed  Historical Provider, MD   Lonza Band (AIMOVIG 140 DOSE) 79 MG/ML SOAJ   Historical Provider, MD   Bupivacaine HCl (LOCAL NERVE BLOCK SYRINGE, WITHOUT EPINEPHRINE,) 30 mLs by Infiltration route every 3 months Indications: Occipital nerve block shot  Historical Provider, MD   [place] 500 MG TABS Take by mouth  Historical Provider, MD   Oxymetazoline HCl (AFRIN 12 HOUR NA) by Nasal route as needed  Historical Provider, MD       Social History     Tobacco Use    Smoking status: Never Smoker    Smokeless tobacco: Never Used   Vaping Use    Vaping Use: Never used   Substance Use Topics    Alcohol use: Yes     Comment: rare    Drug use: No            PHYSICAL EXAMINATION:  [ INSTRUCTIONS:  \"[x]\" Indicates a positive item  \"[]\" Indicates a negative item  -- DELETE ALL ITEMS NOT EXAMINED]  Vital Signs: (As obtained by patient/caregiver or practitioner observation)    Blood pressure-  Heart rate-    Respiratory rate-    Temperature-  Pulse oximetry-     Constitutional: [x] Appears well-developed and well-nourished [x] No apparent distress      [] Abnormal-   Mental status  [x] Alert and awake  [] Oriented to person/place/time [x]Able to follow commands      Eyes:  EOM    [x]  Normal  [] Abnormal-  Sclera  [x]  Normal  [] Abnormal -         Discharge []  None visible  [] Abnormal -    HENT:   [x] Normocephalic, atraumatic.   [] Abnormal   [] Mouth/Throat: Mucous membranes are moist.     External Ears [x] Normal  [] Abnormal-     Neck: [x] No visualized mass     Pulmonary/Chest: [x] Respiratory effort normal.  [x] No visualized signs of difficulty breathing or respiratory distress        [] Abnormal-      Musculoskeletal:   [] Normal gait with no signs of ataxia         [] Normal range of motion of neck        [] Abnormal-       Neurological: appropriate. Due to this being a TeleHealth encounter (During XRCNP-95 public health emergency), evaluation of the following organ systems was limited: Vitals/Constitutional/EENT/Resp/CV/GI//MS/Neuro/Skin/Heme-Lymph-Imm. Pursuant to the emergency declaration under the [address] [place] [address], [address] [place] [address] and the [employer] Act, this Virtual Visit was conducted with patient's (and/or legal guardian's) consent, to reduce the patient's risk of exposure to COVID-19 and provide necessary medical care. The patient (and/or legal guardian) has also been advised to contact this office for worsening conditions or problems, and seek emergency medical treatment and/or call 911 if deemed necessary. Patient identification was verified at the start of the visit: Yes    Total time spent on this encounter: Not billed by time    Services were provided through a video synchronous discussion virtually to substitute for in-person clinic visit. Patient and provider were located at their individual homes. --Ronal Lindo MD on 3/27/2022 at 10:50 AM    An electronic signature was used to authenticate this note.

## 2022-04-02 ENCOUNTER — HOSPITAL ENCOUNTER (INPATIENT)
Facility: HOSPITAL | Age: 47
LOS: 18 days | Discharge: ANOTHER HOSPITAL | End: 2022-04-21
Attending: INTERNAL MEDICINE | Admitting: FAMILY MEDICINE

## 2022-04-02 ENCOUNTER — APPOINTMENT (OUTPATIENT)
Dept: GENERAL RADIOLOGY | Facility: HOSPITAL | Age: 47
End: 2022-04-02

## 2022-04-02 ENCOUNTER — APPOINTMENT (OUTPATIENT)
Dept: CT IMAGING | Facility: HOSPITAL | Age: 47
End: 2022-04-02

## 2022-04-02 DIAGNOSIS — R31.9 URINARY TRACT INFECTION WITH HEMATURIA, SITE UNSPECIFIED: ICD-10-CM

## 2022-04-02 DIAGNOSIS — E87.20 LACTIC ACIDOSIS: ICD-10-CM

## 2022-04-02 DIAGNOSIS — R13.10 DYSPHAGIA, UNSPECIFIED TYPE: ICD-10-CM

## 2022-04-02 DIAGNOSIS — A41.9 SEPSIS SECONDARY TO UTI: ICD-10-CM

## 2022-04-02 DIAGNOSIS — Z78.9 DECREASED ACTIVITIES OF DAILY LIVING (ADL): ICD-10-CM

## 2022-04-02 DIAGNOSIS — N39.0 URINARY TRACT INFECTION WITH HEMATURIA, SITE UNSPECIFIED: ICD-10-CM

## 2022-04-02 DIAGNOSIS — N39.0 SEPSIS SECONDARY TO UTI: ICD-10-CM

## 2022-04-02 DIAGNOSIS — E87.6 HYPOKALEMIA: Primary | ICD-10-CM

## 2022-04-02 DIAGNOSIS — R11.2 NAUSEA AND VOMITING, UNSPECIFIED VOMITING TYPE: ICD-10-CM

## 2022-04-02 LAB
ALBUMIN SERPL-MCNC: 3.6 G/DL (ref 3.5–5.2)
ALBUMIN/GLOB SERPL: 0.8 G/DL
ALP SERPL-CCNC: 149 U/L (ref 39–117)
ALT SERPL W P-5'-P-CCNC: 20 U/L (ref 1–33)
ANION GAP SERPL CALCULATED.3IONS-SCNC: 20 MMOL/L (ref 5–15)
AST SERPL-CCNC: 28 U/L (ref 1–32)
BACTERIA UR QL AUTO: ABNORMAL /HPF
BASOPHILS # BLD AUTO: 0.05 10*3/MM3 (ref 0–0.2)
BASOPHILS NFR BLD AUTO: 0.6 % (ref 0–1.5)
BILIRUB SERPL-MCNC: 1.2 MG/DL (ref 0–1.2)
BILIRUB UR QL STRIP: ABNORMAL
BUN SERPL-MCNC: 13 MG/DL (ref 6–20)
BUN/CREAT SERPL: 11.7 (ref 7–25)
CALCIUM SPEC-SCNC: 10.6 MG/DL (ref 8.6–10.5)
CHLORIDE SERPL-SCNC: 90 MMOL/L (ref 98–107)
CLARITY UR: ABNORMAL
CO2 SERPL-SCNC: 27 MMOL/L (ref 22–29)
COLOR UR: ABNORMAL
CREAT SERPL-MCNC: 1.11 MG/DL (ref 0.57–1)
D-LACTATE SERPL-SCNC: 2.8 MMOL/L (ref 0.5–2)
DEPRECATED RDW RBC AUTO: 44 FL (ref 37–54)
EGFRCR SERPLBLD CKD-EPI 2021: 63 ML/MIN/1.73
EOSINOPHIL # BLD AUTO: 0.15 10*3/MM3 (ref 0–0.4)
EOSINOPHIL NFR BLD AUTO: 1.7 % (ref 0.3–6.2)
ERYTHROCYTE [DISTWIDTH] IN BLOOD BY AUTOMATED COUNT: 14.6 % (ref 12.3–15.4)
FLUAV RNA RESP QL NAA+PROBE: NOT DETECTED
FLUBV RNA RESP QL NAA+PROBE: NOT DETECTED
GLOBULIN UR ELPH-MCNC: 4.4 GM/DL
GLUCOSE SERPL-MCNC: 97 MG/DL (ref 65–99)
GLUCOSE UR STRIP-MCNC: NEGATIVE MG/DL
HCT VFR BLD AUTO: 42.3 % (ref 34–46.6)
HGB BLD-MCNC: 14.4 G/DL (ref 12–15.9)
HGB UR QL STRIP.AUTO: ABNORMAL
HOLD SPECIMEN: NORMAL
HOLD SPECIMEN: NORMAL
IMM GRANULOCYTES # BLD AUTO: 0.04 10*3/MM3 (ref 0–0.05)
IMM GRANULOCYTES NFR BLD AUTO: 0.4 % (ref 0–0.5)
KETONES UR QL STRIP: ABNORMAL
LEUKOCYTE ESTERASE UR QL STRIP.AUTO: ABNORMAL
LYMPHOCYTES # BLD AUTO: 2.44 10*3/MM3 (ref 0.7–3.1)
LYMPHOCYTES NFR BLD AUTO: 26.9 % (ref 19.6–45.3)
MAGNESIUM SERPL-MCNC: 1.7 MG/DL (ref 1.6–2.6)
MCH RBC QN AUTO: 28.1 PG (ref 26.6–33)
MCHC RBC AUTO-ENTMCNC: 34 G/DL (ref 31.5–35.7)
MCV RBC AUTO: 82.5 FL (ref 79–97)
MONOCYTES # BLD AUTO: 0.59 10*3/MM3 (ref 0.1–0.9)
MONOCYTES NFR BLD AUTO: 6.5 % (ref 5–12)
NEUTROPHILS NFR BLD AUTO: 5.8 10*3/MM3 (ref 1.7–7)
NEUTROPHILS NFR BLD AUTO: 63.9 % (ref 42.7–76)
NITRITE UR QL STRIP: NEGATIVE
NRBC BLD AUTO-RTO: 0 /100 WBC (ref 0–0.2)
PH UR STRIP.AUTO: 6.5 [PH] (ref 5–8)
PLATELET # BLD AUTO: 476 10*3/MM3 (ref 140–450)
PMV BLD AUTO: 8.9 FL (ref 6–12)
POTASSIUM SERPL-SCNC: 2.6 MMOL/L (ref 3.5–5.2)
PROCALCITONIN SERPL-MCNC: 0.31 NG/ML (ref 0–0.25)
PROT SERPL-MCNC: 8 G/DL (ref 6–8.5)
PROT UR QL STRIP: ABNORMAL
RBC # BLD AUTO: 5.13 10*6/MM3 (ref 3.77–5.28)
RBC # UR STRIP: ABNORMAL /HPF
REF LAB TEST METHOD: ABNORMAL
SARS-COV-2 RNA RESP QL NAA+PROBE: NOT DETECTED
SODIUM SERPL-SCNC: 137 MMOL/L (ref 136–145)
SP GR UR STRIP: 1.02 (ref 1–1.03)
SQUAMOUS #/AREA URNS HPF: ABNORMAL /HPF
T4 FREE SERPL-MCNC: 1.36 NG/DL (ref 0.93–1.7)
TSH SERPL DL<=0.05 MIU/L-ACNC: 2.41 UIU/ML (ref 0.27–4.2)
UROBILINOGEN UR QL STRIP: ABNORMAL
WBC # UR STRIP: ABNORMAL /HPF
WBC NRBC COR # BLD: 9.07 10*3/MM3 (ref 3.4–10.8)
WHOLE BLOOD HOLD SPECIMEN: NORMAL
WHOLE BLOOD HOLD SPECIMEN: NORMAL

## 2022-04-02 PROCEDURE — 25010000002 ONDANSETRON PER 1 MG: Performed by: PHYSICIAN ASSISTANT

## 2022-04-02 PROCEDURE — 87077 CULTURE AEROBIC IDENTIFY: CPT | Performed by: PHYSICIAN ASSISTANT

## 2022-04-02 PROCEDURE — 87636 SARSCOV2 & INF A&B AMP PRB: CPT | Performed by: PHYSICIAN ASSISTANT

## 2022-04-02 PROCEDURE — 87086 URINE CULTURE/COLONY COUNT: CPT | Performed by: PHYSICIAN ASSISTANT

## 2022-04-02 PROCEDURE — 80050 GENERAL HEALTH PANEL: CPT | Performed by: PHYSICIAN ASSISTANT

## 2022-04-02 PROCEDURE — 87150 DNA/RNA AMPLIFIED PROBE: CPT | Performed by: PHYSICIAN ASSISTANT

## 2022-04-02 PROCEDURE — 74177 CT ABD & PELVIS W/CONTRAST: CPT

## 2022-04-02 PROCEDURE — 0 POTASSIUM CHLORIDE 10 MEQ/100ML SOLUTION: Performed by: PHYSICIAN ASSISTANT

## 2022-04-02 PROCEDURE — 83735 ASSAY OF MAGNESIUM: CPT | Performed by: PHYSICIAN ASSISTANT

## 2022-04-02 PROCEDURE — 25010000002 IOPAMIDOL 61 % SOLUTION: Performed by: PHYSICIAN ASSISTANT

## 2022-04-02 PROCEDURE — 83605 ASSAY OF LACTIC ACID: CPT | Performed by: PHYSICIAN ASSISTANT

## 2022-04-02 PROCEDURE — 87147 CULTURE TYPE IMMUNOLOGIC: CPT | Performed by: PHYSICIAN ASSISTANT

## 2022-04-02 PROCEDURE — 87040 BLOOD CULTURE FOR BACTERIA: CPT | Performed by: PHYSICIAN ASSISTANT

## 2022-04-02 PROCEDURE — 83690 ASSAY OF LIPASE: CPT | Performed by: INTERNAL MEDICINE

## 2022-04-02 PROCEDURE — 87186 SC STD MICRODIL/AGAR DIL: CPT | Performed by: PHYSICIAN ASSISTANT

## 2022-04-02 PROCEDURE — 84439 ASSAY OF FREE THYROXINE: CPT | Performed by: PHYSICIAN ASSISTANT

## 2022-04-02 PROCEDURE — 84145 PROCALCITONIN (PCT): CPT | Performed by: PHYSICIAN ASSISTANT

## 2022-04-02 PROCEDURE — 99284 EMERGENCY DEPT VISIT MOD MDM: CPT

## 2022-04-02 PROCEDURE — 71045 X-RAY EXAM CHEST 1 VIEW: CPT

## 2022-04-02 PROCEDURE — P9612 CATHETERIZE FOR URINE SPEC: HCPCS

## 2022-04-02 PROCEDURE — 81001 URINALYSIS AUTO W/SCOPE: CPT | Performed by: PHYSICIAN ASSISTANT

## 2022-04-02 PROCEDURE — 83036 HEMOGLOBIN GLYCOSYLATED A1C: CPT | Performed by: INTERNAL MEDICINE

## 2022-04-02 RX ORDER — SODIUM CHLORIDE 0.9 % (FLUSH) 0.9 %
10 SYRINGE (ML) INJECTION AS NEEDED
Status: DISCONTINUED | OUTPATIENT
Start: 2022-04-02 | End: 2022-04-21 | Stop reason: HOSPADM

## 2022-04-02 RX ORDER — POTASSIUM CHLORIDE 7.45 MG/ML
10 INJECTION INTRAVENOUS ONCE
Status: COMPLETED | OUTPATIENT
Start: 2022-04-02 | End: 2022-04-03

## 2022-04-02 RX ORDER — ONDANSETRON 2 MG/ML
4 INJECTION INTRAMUSCULAR; INTRAVENOUS ONCE
Status: COMPLETED | OUTPATIENT
Start: 2022-04-02 | End: 2022-04-02

## 2022-04-02 RX ADMIN — SODIUM CHLORIDE, POTASSIUM CHLORIDE, SODIUM LACTATE AND CALCIUM CHLORIDE 500 ML: 600; 310; 30; 20 INJECTION, SOLUTION INTRAVENOUS at 22:38

## 2022-04-02 RX ADMIN — IOPAMIDOL 100 ML: 612 INJECTION, SOLUTION INTRAVENOUS at 22:15

## 2022-04-02 RX ADMIN — ONDANSETRON HYDROCHLORIDE 4 MG: 2 SOLUTION INTRAMUSCULAR; INTRAVENOUS at 22:44

## 2022-04-02 RX ADMIN — POTASSIUM CHLORIDE 10 MEQ: 7.46 INJECTION, SOLUTION INTRAVENOUS at 23:00

## 2022-04-03 PROBLEM — M79.18 MYOFASCIAL PAIN SYNDROME: Status: ACTIVE | Noted: 2019-01-08

## 2022-04-03 PROBLEM — E87.6 HYPOKALEMIA: Status: ACTIVE | Noted: 2022-04-03

## 2022-04-03 PROBLEM — T83.098A MALFUNCTION OF NEPHROSTOMY TUBE (HCC): Status: ACTIVE | Noted: 2022-04-03

## 2022-04-03 PROBLEM — R11.2 INTRACTABLE NAUSEA AND VOMITING: Status: ACTIVE | Noted: 2022-04-03

## 2022-04-03 PROBLEM — N39.0 UTI (URINARY TRACT INFECTION), BACTERIAL: Status: ACTIVE | Noted: 2022-04-03

## 2022-04-03 PROBLEM — F41.9 ANXIETY: Status: ACTIVE | Noted: 2018-08-23

## 2022-04-03 PROBLEM — A49.9 UTI (URINARY TRACT INFECTION), BACTERIAL: Status: ACTIVE | Noted: 2022-04-03

## 2022-04-03 PROBLEM — E53.8 VITAMIN B12 DEFICIENCY: Status: ACTIVE | Noted: 2018-08-23

## 2022-04-03 PROBLEM — I10 ESSENTIAL (PRIMARY) HYPERTENSION: Status: ACTIVE | Noted: 2017-08-30

## 2022-04-03 PROBLEM — N39.0 SEPSIS SECONDARY TO UTI (HCC): Status: ACTIVE | Noted: 2021-08-27

## 2022-04-03 LAB
ALBUMIN SERPL-MCNC: 2.6 G/DL (ref 3.5–5.2)
ALBUMIN/GLOB SERPL: 1 G/DL
ALP SERPL-CCNC: 100 U/L (ref 39–117)
ALT SERPL W P-5'-P-CCNC: 13 U/L (ref 1–33)
ANION GAP SERPL CALCULATED.3IONS-SCNC: 15 MMOL/L (ref 5–15)
AST SERPL-CCNC: 18 U/L (ref 1–32)
BACTERIA BLD CULT: ABNORMAL
BASOPHILS # BLD AUTO: 0.06 10*3/MM3 (ref 0–0.2)
BASOPHILS NFR BLD AUTO: 0.6 % (ref 0–1.5)
BILIRUB SERPL-MCNC: 0.6 MG/DL (ref 0–1.2)
BOTTLE TYPE: ABNORMAL
BUN SERPL-MCNC: 13 MG/DL (ref 6–20)
BUN/CREAT SERPL: 13.7 (ref 7–25)
CALCIUM SPEC-SCNC: 8.7 MG/DL (ref 8.6–10.5)
CHLORIDE SERPL-SCNC: 97 MMOL/L (ref 98–107)
CO2 SERPL-SCNC: 25 MMOL/L (ref 22–29)
CREAT SERPL-MCNC: 0.95 MG/DL (ref 0.57–1)
D-LACTATE SERPL-SCNC: 1.7 MMOL/L (ref 0.5–2)
DEPRECATED RDW RBC AUTO: 43 FL (ref 37–54)
EGFRCR SERPLBLD CKD-EPI 2021: 75.9 ML/MIN/1.73
EOSINOPHIL # BLD AUTO: 0.18 10*3/MM3 (ref 0–0.4)
EOSINOPHIL NFR BLD AUTO: 1.8 % (ref 0.3–6.2)
ERYTHROCYTE [DISTWIDTH] IN BLOOD BY AUTOMATED COUNT: 14.6 % (ref 12.3–15.4)
GLOBULIN UR ELPH-MCNC: 2.7 GM/DL
GLUCOSE SERPL-MCNC: 100 MG/DL (ref 65–99)
HBA1C MFR BLD: 4.6 % (ref 4.8–5.6)
HCT VFR BLD AUTO: 32.8 % (ref 34–46.6)
HGB BLD-MCNC: 11.4 G/DL (ref 12–15.9)
IMM GRANULOCYTES # BLD AUTO: 0.05 10*3/MM3 (ref 0–0.05)
IMM GRANULOCYTES NFR BLD AUTO: 0.5 % (ref 0–0.5)
LIPASE SERPL-CCNC: 32 U/L (ref 13–60)
LYMPHOCYTES # BLD AUTO: 1.58 10*3/MM3 (ref 0.7–3.1)
LYMPHOCYTES NFR BLD AUTO: 15.6 % (ref 19.6–45.3)
MCH RBC QN AUTO: 28.4 PG (ref 26.6–33)
MCHC RBC AUTO-ENTMCNC: 34.8 G/DL (ref 31.5–35.7)
MCV RBC AUTO: 81.6 FL (ref 79–97)
MONOCYTES # BLD AUTO: 0.96 10*3/MM3 (ref 0.1–0.9)
MONOCYTES NFR BLD AUTO: 9.5 % (ref 5–12)
NEUTROPHILS NFR BLD AUTO: 7.28 10*3/MM3 (ref 1.7–7)
NEUTROPHILS NFR BLD AUTO: 72 % (ref 42.7–76)
NRBC BLD AUTO-RTO: 0 /100 WBC (ref 0–0.2)
PLATELET # BLD AUTO: 325 10*3/MM3 (ref 140–450)
PMV BLD AUTO: 9.5 FL (ref 6–12)
POTASSIUM SERPL-SCNC: 2.5 MMOL/L (ref 3.5–5.2)
POTASSIUM SERPL-SCNC: 3.5 MMOL/L (ref 3.5–5.2)
PROT SERPL-MCNC: 5.3 G/DL (ref 6–8.5)
RBC # BLD AUTO: 4.02 10*6/MM3 (ref 3.77–5.28)
SODIUM SERPL-SCNC: 137 MMOL/L (ref 136–145)
WBC NRBC COR # BLD: 10.11 10*3/MM3 (ref 3.4–10.8)

## 2022-04-03 PROCEDURE — 25010000002 METOCLOPRAMIDE PER 10 MG: Performed by: INTERNAL MEDICINE

## 2022-04-03 PROCEDURE — 0 POTASSIUM CHLORIDE 10 MEQ/100ML SOLUTION: Performed by: PHYSICIAN ASSISTANT

## 2022-04-03 PROCEDURE — G0378 HOSPITAL OBSERVATION PER HR: HCPCS

## 2022-04-03 PROCEDURE — 25010000002 ERTAPENEM PER 500 MG: Performed by: INTERNAL MEDICINE

## 2022-04-03 PROCEDURE — 25010000002 CEFTRIAXONE PER 250 MG: Performed by: PHYSICIAN ASSISTANT

## 2022-04-03 PROCEDURE — 85025 COMPLETE CBC W/AUTO DIFF WBC: CPT | Performed by: INTERNAL MEDICINE

## 2022-04-03 PROCEDURE — 80053 COMPREHEN METABOLIC PANEL: CPT | Performed by: INTERNAL MEDICINE

## 2022-04-03 PROCEDURE — 97165 OT EVAL LOW COMPLEX 30 MIN: CPT | Performed by: OCCUPATIONAL THERAPIST

## 2022-04-03 PROCEDURE — 25010000002 SODIUM CHLORIDE 0.9 % WITH KCL 20 MEQ 20-0.9 MEQ/L-% SOLUTION: Performed by: INTERNAL MEDICINE

## 2022-04-03 PROCEDURE — 0 POTASSIUM CHLORIDE 10 MEQ/100ML SOLUTION: Performed by: INTERNAL MEDICINE

## 2022-04-03 PROCEDURE — 83605 ASSAY OF LACTIC ACID: CPT | Performed by: PHYSICIAN ASSISTANT

## 2022-04-03 PROCEDURE — 97167 OT EVAL HIGH COMPLEX 60 MIN: CPT | Performed by: OCCUPATIONAL THERAPIST

## 2022-04-03 PROCEDURE — 99214 OFFICE O/P EST MOD 30 MIN: CPT | Performed by: INTERNAL MEDICINE

## 2022-04-03 PROCEDURE — 84132 ASSAY OF SERUM POTASSIUM: CPT | Performed by: INTERNAL MEDICINE

## 2022-04-03 PROCEDURE — 36415 COLL VENOUS BLD VENIPUNCTURE: CPT | Performed by: PHYSICIAN ASSISTANT

## 2022-04-03 PROCEDURE — 25010000002 MAGNESIUM SULFATE 2 GM/50ML SOLUTION: Performed by: INTERNAL MEDICINE

## 2022-04-03 RX ORDER — ZOLPIDEM TARTRATE 5 MG/1
5 TABLET ORAL NIGHTLY PRN
Status: DISCONTINUED | OUTPATIENT
Start: 2022-04-03 | End: 2022-04-10

## 2022-04-03 RX ORDER — POTASSIUM CHLORIDE 7.45 MG/ML
10 INJECTION INTRAVENOUS
Status: COMPLETED | OUTPATIENT
Start: 2022-04-03 | End: 2022-04-03

## 2022-04-03 RX ORDER — PROMETHAZINE HYDROCHLORIDE 25 MG/1
25-50 TABLET ORAL EVERY 6 HOURS PRN
COMMUNITY

## 2022-04-03 RX ORDER — BUTALBITAL, ACETAMINOPHEN AND CAFFEINE 50; 325; 40 MG/1; MG/1; MG/1
2 TABLET ORAL EVERY 4 HOURS PRN
COMMUNITY

## 2022-04-03 RX ORDER — RIZATRIPTAN BENZOATE 10 MG/1
10 TABLET ORAL ONCE AS NEEDED
COMMUNITY

## 2022-04-03 RX ORDER — PROMETHAZINE HYDROCHLORIDE 25 MG/1
25 TABLET ORAL EVERY 6 HOURS PRN
Status: DISCONTINUED | OUTPATIENT
Start: 2022-04-03 | End: 2022-04-10

## 2022-04-03 RX ORDER — DROPERIDOL 2.5 MG/ML
1.25 INJECTION, SOLUTION INTRAMUSCULAR; INTRAVENOUS EVERY 6 HOURS PRN
Status: DISCONTINUED | OUTPATIENT
Start: 2022-04-03 | End: 2022-04-09 | Stop reason: ALTCHOICE

## 2022-04-03 RX ORDER — OXYMETAZOLINE HYDROCHLORIDE 0.05 G/100ML
2 SPRAY NASAL 2 TIMES DAILY
COMMUNITY

## 2022-04-03 RX ORDER — ONDANSETRON 2 MG/ML
4 INJECTION INTRAMUSCULAR; INTRAVENOUS EVERY 6 HOURS PRN
Status: DISCONTINUED | OUTPATIENT
Start: 2022-04-03 | End: 2022-04-21 | Stop reason: HOSPADM

## 2022-04-03 RX ORDER — TRAZODONE HYDROCHLORIDE 100 MG/1
100 TABLET ORAL NIGHTLY
COMMUNITY

## 2022-04-03 RX ORDER — TRAZODONE HYDROCHLORIDE 100 MG/1
100 TABLET ORAL NIGHTLY
Status: DISCONTINUED | OUTPATIENT
Start: 2022-04-03 | End: 2022-04-10

## 2022-04-03 RX ORDER — ESZOPICLONE 3 MG/1
3 TABLET, FILM COATED ORAL NIGHTLY
COMMUNITY

## 2022-04-03 RX ORDER — ONDANSETRON 4 MG/1
4 TABLET, FILM COATED ORAL EVERY 6 HOURS PRN
Status: DISCONTINUED | OUTPATIENT
Start: 2022-04-03 | End: 2022-04-21 | Stop reason: HOSPADM

## 2022-04-03 RX ORDER — PROCHLORPERAZINE MALEATE 10 MG
10 TABLET ORAL EVERY 8 HOURS PRN
COMMUNITY

## 2022-04-03 RX ORDER — ONDANSETRON 4 MG/1
4 TABLET, ORALLY DISINTEGRATING ORAL 4 TIMES DAILY PRN
COMMUNITY

## 2022-04-03 RX ORDER — OXYMETAZOLINE HYDROCHLORIDE 0.05 G/100ML
2 SPRAY NASAL 2 TIMES DAILY
Status: DISCONTINUED | OUTPATIENT
Start: 2022-04-03 | End: 2022-04-18

## 2022-04-03 RX ORDER — POTASSIUM CHLORIDE 7.45 MG/ML
10 INJECTION INTRAVENOUS
Status: DISPENSED | OUTPATIENT
Start: 2022-04-03 | End: 2022-04-03

## 2022-04-03 RX ORDER — FLUOXETINE HYDROCHLORIDE 40 MG/1
40 CAPSULE ORAL DAILY
COMMUNITY

## 2022-04-03 RX ORDER — NEOMYCIN SULFATE, POLYMYXIN B SULFATE AND HYDROCORTISONE 10; 3.5; 1 MG/ML; MG/ML; [USP'U]/ML
4 SUSPENSION/ DROPS AURICULAR (OTIC) EVERY 6 HOURS SCHEDULED
Status: DISCONTINUED | OUTPATIENT
Start: 2022-04-03 | End: 2022-04-20

## 2022-04-03 RX ORDER — PANTOPRAZOLE SODIUM 20 MG/1
20 TABLET, DELAYED RELEASE ORAL DAILY
COMMUNITY

## 2022-04-03 RX ORDER — OXYMETAZOLINE HYDROCHLORIDE 0.05 G/100ML
2 SPRAY NASAL 2 TIMES DAILY
Status: DISCONTINUED | OUTPATIENT
Start: 2022-04-03 | End: 2022-04-03

## 2022-04-03 RX ORDER — PROCHLORPERAZINE MALEATE 10 MG
10 TABLET ORAL EVERY 8 HOURS PRN
Status: DISCONTINUED | OUTPATIENT
Start: 2022-04-03 | End: 2022-04-09 | Stop reason: ALTCHOICE

## 2022-04-03 RX ORDER — FLUOXETINE HYDROCHLORIDE 20 MG/1
40 CAPSULE ORAL DAILY
Status: DISCONTINUED | OUTPATIENT
Start: 2022-04-03 | End: 2022-04-10

## 2022-04-03 RX ORDER — PANTOPRAZOLE SODIUM 40 MG/10ML
40 INJECTION, POWDER, LYOPHILIZED, FOR SOLUTION INTRAVENOUS EVERY 12 HOURS SCHEDULED
Status: DISCONTINUED | OUTPATIENT
Start: 2022-04-03 | End: 2022-04-09

## 2022-04-03 RX ORDER — SODIUM CHLORIDE 0.9 % (FLUSH) 0.9 %
10 SYRINGE (ML) INJECTION EVERY 12 HOURS SCHEDULED
Status: DISCONTINUED | OUTPATIENT
Start: 2022-04-03 | End: 2022-04-21 | Stop reason: HOSPADM

## 2022-04-03 RX ORDER — MAGNESIUM SULFATE HEPTAHYDRATE 40 MG/ML
2 INJECTION, SOLUTION INTRAVENOUS ONCE
Status: COMPLETED | OUTPATIENT
Start: 2022-04-03 | End: 2022-04-03

## 2022-04-03 RX ORDER — METHOCARBAMOL 500 MG/1
500 TABLET, FILM COATED ORAL 2 TIMES DAILY PRN
Status: DISCONTINUED | OUTPATIENT
Start: 2022-04-03 | End: 2022-04-10

## 2022-04-03 RX ORDER — SUMATRIPTAN 50 MG/1
50 TABLET, FILM COATED ORAL ONCE
Status: DISCONTINUED | OUTPATIENT
Start: 2022-04-03 | End: 2022-04-17

## 2022-04-03 RX ORDER — SODIUM CHLORIDE AND POTASSIUM CHLORIDE 150; 900 MG/100ML; MG/100ML
100 INJECTION, SOLUTION INTRAVENOUS CONTINUOUS
Status: DISCONTINUED | OUTPATIENT
Start: 2022-04-03 | End: 2022-04-05

## 2022-04-03 RX ORDER — SODIUM CHLORIDE 0.9 % (FLUSH) 0.9 %
10 SYRINGE (ML) INJECTION AS NEEDED
Status: DISCONTINUED | OUTPATIENT
Start: 2022-04-03 | End: 2022-04-21 | Stop reason: HOSPADM

## 2022-04-03 RX ORDER — METHOCARBAMOL 500 MG/1
500 TABLET, FILM COATED ORAL 3 TIMES DAILY PRN
COMMUNITY

## 2022-04-03 RX ORDER — METOCLOPRAMIDE HYDROCHLORIDE 5 MG/5ML
5 SOLUTION ORAL
COMMUNITY

## 2022-04-03 RX ORDER — METOCLOPRAMIDE HYDROCHLORIDE 5 MG/ML
10 INJECTION INTRAMUSCULAR; INTRAVENOUS EVERY 6 HOURS
Status: DISCONTINUED | OUTPATIENT
Start: 2022-04-03 | End: 2022-04-04

## 2022-04-03 RX ORDER — FLUTICASONE PROPIONATE 50 MCG
2 SPRAY, SUSPENSION (ML) NASAL 2 TIMES DAILY
Status: DISCONTINUED | OUTPATIENT
Start: 2022-04-03 | End: 2022-04-21 | Stop reason: HOSPADM

## 2022-04-03 RX ORDER — BUTALBITAL, ACETAMINOPHEN AND CAFFEINE 50; 325; 40 MG/1; MG/1; MG/1
2 TABLET ORAL EVERY 4 HOURS PRN
Status: DISCONTINUED | OUTPATIENT
Start: 2022-04-03 | End: 2022-04-10

## 2022-04-03 RX ADMIN — POTASSIUM CHLORIDE 10 MEQ: 7.46 INJECTION, SOLUTION INTRAVENOUS at 13:53

## 2022-04-03 RX ADMIN — TRAZODONE HYDROCHLORIDE 100 MG: 100 TABLET ORAL at 20:32

## 2022-04-03 RX ADMIN — METOCLOPRAMIDE HYDROCHLORIDE 10 MG: 5 INJECTION INTRAMUSCULAR; INTRAVENOUS at 05:34

## 2022-04-03 RX ADMIN — FLUOXETINE HYDROCHLORIDE 40 MG: 20 CAPSULE ORAL at 09:03

## 2022-04-03 RX ADMIN — WATER 1 G: 100 INJECTION, SOLUTION INTRAVENOUS at 00:14

## 2022-04-03 RX ADMIN — POTASSIUM CHLORIDE 10 MEQ: 7.46 INJECTION, SOLUTION INTRAVENOUS at 10:23

## 2022-04-03 RX ADMIN — MAGNESIUM SULFATE HEPTAHYDRATE 2 G: 2 INJECTION, SOLUTION INTRAVENOUS at 05:23

## 2022-04-03 RX ADMIN — Medication 10 ML: at 09:04

## 2022-04-03 RX ADMIN — Medication 10 ML: at 20:32

## 2022-04-03 RX ADMIN — POTASSIUM CHLORIDE 10 MEQ: 7.46 INJECTION, SOLUTION INTRAVENOUS at 09:04

## 2022-04-03 RX ADMIN — METOCLOPRAMIDE HYDROCHLORIDE 10 MG: 5 INJECTION INTRAMUSCULAR; INTRAVENOUS at 23:25

## 2022-04-03 RX ADMIN — NEOMYCIN SULFATE, POLYMYXIN B SULFATE AND HYDROCORTISONE 4 DROP: 10; 3.5; 1 SUSPENSION/ DROPS AURICULAR (OTIC) at 16:00

## 2022-04-03 RX ADMIN — POTASSIUM CHLORIDE AND SODIUM CHLORIDE 100 ML/HR: 900; 150 INJECTION, SOLUTION INTRAVENOUS at 17:44

## 2022-04-03 RX ADMIN — POTASSIUM CHLORIDE 10 MEQ: 7.46 INJECTION, SOLUTION INTRAVENOUS at 00:28

## 2022-04-03 RX ADMIN — NEOMYCIN SULFATE, POLYMYXIN B SULFATE AND HYDROCORTISONE 4 DROP: 10; 3.5; 1 SUSPENSION/ DROPS AURICULAR (OTIC) at 23:26

## 2022-04-03 RX ADMIN — POTASSIUM CHLORIDE 10 MEQ: 7.46 INJECTION, SOLUTION INTRAVENOUS at 15:58

## 2022-04-03 RX ADMIN — POTASSIUM CHLORIDE AND SODIUM CHLORIDE 100 ML/HR: 900; 150 INJECTION, SOLUTION INTRAVENOUS at 05:23

## 2022-04-03 RX ADMIN — PANTOPRAZOLE SODIUM 40 MG: 40 INJECTION, POWDER, FOR SOLUTION INTRAVENOUS at 20:32

## 2022-04-03 RX ADMIN — ERTAPENEM SODIUM 1 G: 1 INJECTION, POWDER, LYOPHILIZED, FOR SOLUTION INTRAMUSCULAR; INTRAVENOUS at 17:42

## 2022-04-03 RX ADMIN — METOCLOPRAMIDE HYDROCHLORIDE 10 MG: 5 INJECTION INTRAMUSCULAR; INTRAVENOUS at 10:24

## 2022-04-03 RX ADMIN — POTASSIUM CHLORIDE 10 MEQ: 7.46 INJECTION, SOLUTION INTRAVENOUS at 12:19

## 2022-04-03 RX ADMIN — NEOMYCIN SULFATE, POLYMYXIN B SULFATE AND HYDROCORTISONE 4 DROP: 10; 3.5; 1 SUSPENSION/ DROPS AURICULAR (OTIC) at 17:49

## 2022-04-03 RX ADMIN — RIVAROXABAN 20 MG: 20 TABLET, FILM COATED ORAL at 20:32

## 2022-04-03 RX ADMIN — PANTOPRAZOLE SODIUM 40 MG: 40 INJECTION, POWDER, FOR SOLUTION INTRAVENOUS at 06:07

## 2022-04-03 RX ADMIN — FLUTICASONE PROPIONATE 2 SPRAY: 50 SPRAY, METERED NASAL at 20:33

## 2022-04-03 RX ADMIN — Medication 2 SPRAY: at 20:33

## 2022-04-03 RX ADMIN — FLUTICASONE PROPIONATE 2 SPRAY: 50 SPRAY, METERED NASAL at 10:24

## 2022-04-03 RX ADMIN — METOCLOPRAMIDE HYDROCHLORIDE 10 MG: 5 INJECTION INTRAMUSCULAR; INTRAVENOUS at 17:49

## 2022-04-03 NOTE — H&P
[facility] Medicine Services  HISTORY AND PHYSICAL    Date of Admission: 4/2/2022  Primary Care Physician: David Lara MD    Subjective     Chief Complaint: Nausea and vomiting    History of Present Illness  Patient is a 46-year-old white female past medical history of very complicated course recently.  She had COVID-19 in August of last year complicated by pulmonary emboli on anticoagulation and an abdominal hematoma that formed.  This caused obstruction of her ureters leading to renal failure requiring dialysis temporarily.  She also has nephrostomy tubes 1 which is already been removed.  She had been in a nursing home and LTAC up until about 3 weeks ago.  After she returned home she started having problems with vomiting every single time she eats food or drinks liquids.  She had been hospitalized in the local hospital where her potassium was found to be low was replaced but she says nothing was done about her intractable vomiting.  She presents here today with similar problems.  She states that her bowels have been moving normally she denies fevers or chills she denies abdominal pain she has lost about 50 pounds.  It appears on her medication list she is on several antiemetics and Reglan liquid.  She denies diarrhea she was evaluated today has evidence of urinary tract infection.  Her nephrostomy tube no longer produces urine.  CT of the abdomen and pelvis shows left-sided internal/external nephroureteral stent, hydronephrosis with urothelial thickening and bladder mucosal enhancement possibly reactive versus infectious.  No evidence of perinephric fluid collection no obstructing stones remainder of organs are unremarkable and no abnormality of GI tract.  She does have evidence of UTI and her potassium today is 2.6.  Her LFTs show an elevated alk phos otherwise unremarkable.  He does still have her gallbladder.  She denies any hematemesis or coffee-ground emesis.   She has had a history of ulcers.        Review of Systems   14 point review of systems negative except for as per HPI    Otherwise complete ROS reviewed and negative except as mentioned in the HPI.    Past Medical History:   Past Medical History:   Diagnosis Date   • Angina at rest (HCC)    • Migraine     Sees Pain Management for injections.    • MVP (mitral valve prolapse)    • Renal disorder    • Syncope      Past Surgical History:  Past Surgical History:   Procedure Laterality Date   • BREAST BIOPSY Right 2011    benign   • INSERTION HEMODIALYSIS CATHETER Left 9/16/2021    Procedure: HEMODIALYSIS CATHETER INSERTION;  Surgeon: Anders Kaur MD;  Location: [place] [id number];  Service: Vascular;  Laterality: Left;   • TONSILLECTOMY       Social History:  reports that she has never smoked. She has never used smokeless tobacco. She reports that she does not drink alcohol and does not use drugs.    Family History: family history includes Colon cancer (age of onset: 52) in her maternal aunt; Fibromyalgia in her mother; Heart disease in her mother; Hodgkin's lymphoma in her paternal grandmother; Hypertension in her mother.       Allergies:  Allergies   Allergen Reactions   • Coconut Unknown - High Severity   • Nuts Unknown - High Severity   • Penicillins    • [place] Other (See Comments)     Causes migraines per pt reports       Medications:  Prior to Admission medications    Medication Sig Start Date End Date Taking? Authorizing Provider   butalbital-acetaminophen-caffeine (FIORICET, ESGIC) -40 MG per tablet Take 2 tablets by mouth Every 4 (Four) Hours As Needed for Headache or Migraine.   Yes ProviderOdalys MD   eszopiclone (LUNESTA) 3 MG tablet Take 3 mg by mouth Every Night. Take immediately before bedtime   Yes ProviderOdalys MD   FLUoxetine (PROzac) 20 MG capsule Take 40 mg by mouth Daily.   Yes Odalys Mullen MD   methocarbamol (ROBAXIN) 500 MG tablet Take 500 mg by mouth 2  (Two) Times a Day As Needed for Muscle Spasms.   Yes Odalys Mullen MD   metoclopramide (REGLAN) 5 MG/5ML solution Take 5 mg by mouth 3 (Three) Times a Day Before Meals.   Yes Odalys Mullen MD   ondansetron ODT (ZOFRAN-ODT) 4 MG disintegrating tablet Place 4 mg on the tongue 4 (Four) Times a Day As Needed for Nausea or Vomiting.   Yes Odalys Mullen MD   oxymetazoline (AFRIN) 0.05 % nasal spray 2 sprays into the nostril(s) as directed by provider 2 (Two) Times a Day.   Yes Odalys Mullen MD   pantoprazole (PROTONIX) 40 MG EC tablet Take 1 tablet by mouth Every Morning. 9/25/21  Yes Darrell De La Torre DO   prochlorperazine (COMPAZINE) 10 MG tablet Take 10 mg by mouth Every 8 (Eight) Hours As Needed for Nausea or Vomiting.   Yes Odalys Mullen MD   promethazine (PHENERGAN) 25 MG tablet Take 25 mg by mouth Every 6 (Six) Hours As Needed for Nausea or Vomiting.   Yes Odalys Mullen MD   rivaroxaban (XARELTO) 20 MG tablet Take 20 mg by mouth Every Night.   Yes Odalys Mullen MD   rizatriptan (MAXALT) 10 MG tablet Take 10 mg by mouth 1 (One) Time As Needed for Migraine. May repeat in 2 hours if needed   Yes Odalys Mullen MD   traZODone (DESYREL) 100 MG tablet Take 100 mg by mouth Every Night.   Yes Odalys Mullen MD   acetaminophen (TYLENOL) 325 MG tablet Take 2 tablets by mouth Every 6 (Six) Hours As Needed for Mild Pain , Fever or Headache (fever greater than 101.5 F or headache). 9/24/21   Darrell De La Torre DO   bisacodyl (DULCOLAX) 10 MG suppository Insert 1 suppository into the rectum Daily. 9/25/21   Darrell De La Torre DO   cloNIDine (CATAPRES-TTS) 0.1 MG/24HR patch Place 1 patch on the skin as directed by provider 1 (One) Time Per Week. 9/26/21   Darrell De La Torre DO   dextrose (D50W) 50 % solution Infuse 50 mL into a venous catheter Every 15 (Fifteen) Minutes As Needed for Low Blood Sugar (Blood Sugar Less Than 70). 9/24/21   Darrell De La Torre, DO    dextrose (GLUTOSE) 40 % gel Take 15 g by mouth Every 15 (Fifteen) Minutes As Needed for Low Blood Sugar (Blood sugar less than 70). 9/24/21   Darrell De La Torre DO   epoetin mindy-epbx (RETACRIT) 17968 UNIT/ML injection Inject 1 mL under the skin into the appropriate area as directed 3 (Three) Times a Week. Indications: ESRD on Dialysis 9/27/21   Darrell De La Torre DO   glucagon, human recombinant, (GLUCAGEN DIAGNOSTIC) 1 MG injection Inject 1 mg under the skin into the appropriate area as directed Every 15 (Fifteen) Minutes As Needed (Blood Glucose Less Than 70) for up to 360 days. 9/24/21 9/19/22  Darrell De La Torre DO   Heparin Sodium, Porcine, (heparin, porcine,) 1000 UNIT/ML injection 3.6 mL by Intracatheter route As Needed (after dialysis). Indications: Other - full anticoagulation 9/24/21   Darrell De La Torre DO   insulin regular (humuLIN R,novoLIN R) 100 UNIT/ML injection Inject 0-9 Units under the skin into the appropriate area as directed Every 6 (Six) Hours. 9/24/21   Darrell De La Torre DO   lidocaine (LIDODERM) 5 % Place 1 patch on the skin as directed by provider Daily. Remove & Discard patch within 12 hours or as directed by MD 9/25/21   Darrell De La Torre DO   lidocaine (LIDODERM) 5 % Place 1 patch on the skin as directed by provider Daily. Remove & Discard patch within 12 hours or as directed by MD 9/25/21   Darrell De La Torre DO   metoprolol tartrate (LOPRESSOR) 25 MG tablet Take 1 tablet by mouth Every 12 (Twelve) Hours. 9/24/21   Darrell De La Torre DO   ondansetron (ZOFRAN) 4 MG/2ML injection Infuse 2 mL into a venous catheter Every 6 (Six) Hours As Needed for Nausea or Vomiting. 9/24/21   Darrell De La Torre, DO   sennosides-docusate (PERICOLACE) 8.6-50 MG per tablet Take 1 tablet by mouth 2 (Two) Times a Day. 9/24/21   Darrell De La Torre, DO     I have utilized all available immediate resources to obtain, update, and review the patient's current medications.    Objective     Vital Signs: /91 (BP  "Location: Right arm, Patient Position: Lying)   Pulse 107   Temp 97.8 °F (36.6 °C) (Oral)   Resp 16   Ht 170.2 cm (67\")   Wt 63.4 kg (139 lb 11.2 oz)   SpO2 100%   BMI 21.88 kg/m²   Physical Exam   Gen.:  Well-nourished well-developed white female in no acute distress  HEENT: Atraumatic, normocephalic.  Pupils equal, round, and reactive to light. Extraocular movements intact.  Sclerae anicteric.  External ears negative.  Mucous membranes moist.  Neck is supple without lymphadenopathy.  No JVD is noted.  No carotid bruits are auscultated.  Oropharynx is without erythema or exudate.   Chest: Clear to auscultation and percussion.  CV: Regular rate and rhythm.  Normal S1-S2.  No gallops, murmurs. or rubs.  Abdomen: Soft, periumbilical tenderness, nondistended.  Active bowel sounds,  No hepatosplenomegaly.  No masses.  No hernias.  Midline incision almost fully healed by secondary intent  Extremities: No clubbing, edema, or cyanosis.  Capillary refill is normal.  Pulses are 2+ and symmetric at radial and dorsalis pedis.  Posterior tibial pulses are intact and 2+ palpable.  Neuro: Patient is awake, alert, and oriented ×3.  Cranial nerves II through XII are grossly intact.  Motor is 5 out of 5 bilaterally.  DTRs are 2+ and symmetric bilaterally. Sensory exam is nonfocal  Skin: Warm, dry, and intact.  No evidence of breakdown.  Midline incision  Sensorium: Normal thought and affect    Nursing notes and vital signs reviewed        Results Reviewed:  Lab Results (last 24 hours)     Procedure Component Value Units Date/Time    Lipase [[id number]]  (Normal) Collected: 04/02/22 2113    Specimen: Blood Updated: 04/03/22 0423     Lipase 32 U/L     Hemoglobin A1c [[id number]] Collected: 04/02/22 2113    Specimen: Blood Updated: 04/03/22 0416    STAT Lactic Acid, Reflex [[id number]]  (Normal) Collected: 04/03/22 0152    Specimen: Blood Updated: 04/03/22 0229     Lactate 1.7 mmol/L     COVID-19 and FLU A/B PCR - Swab, Nasopharynx " [[id number]]  (Normal) Collected: 04/02/22 2154    Specimen: Swab from Nasopharynx Updated: 04/02/22 2312     COVID19 Not Detected     Influenza A PCR Not Detected     Influenza B PCR Not Detected    Narrative:      Fact sheet for providers: https://www.fda.gov/media/984680/download    Fact sheet for patients: https://www.fda.gov/media/007477/download    Test performed by PCR.    Lactic Acid, Plasma [[id number]]  (Abnormal) Collected: 04/02/22 2235    Specimen: Blood Updated: 04/02/22 2306     Lactate 2.8 mmol/L     Urinalysis With Culture If Indicated - Urine, Catheter [[id number]]  (Abnormal) Collected: 04/02/22 2221    Specimen: Urine, Catheter Updated: 04/02/22 2258     Color, UA [place]     Appearance, UA Turbid     pH, UA 6.5     Specific Gravity, UA 1.016     Glucose, UA Negative     Ketones, UA Trace     Bilirubin, UA Small (1+)     Blood, UA Large (3+)     Protein, UA >=300 mg/dL (3+)     Leuk Esterase, UA Large (3+)     Nitrite, UA Negative     Urobilinogen, UA 1.0 E.U./dL    Narrative:      Dipstick results may be inaccurate due to color interference.    Urinalysis, Microscopic Only - Urine, Catheter [[id number]]  (Abnormal) Collected: 04/02/22 2221    Specimen: Urine, Catheter Updated: 04/02/22 2258     RBC, UA Too Numerous to Count /HPF      WBC, UA Too Numerous to Count /HPF      Bacteria, UA 4+ /HPF      Squamous Epithelial Cells, UA       Unable to determine due to loaded field     /HPF     Methodology Manual Light Microscopy    Urine Culture - Urine, Urine, Catheter [[id number]] Collected: 04/02/22 2221    Specimen: Urine, Catheter Updated: 04/02/22 2258    Blood Culture - Blood, Hand, Left [[id number]] Collected: 04/02/22 2235    Specimen: Blood from Hand, Left Updated: 04/02/22 2250    Blood Culture - Blood, Arm, Right [[id number]] Collected: 04/02/22 2235    Specimen: Blood from Arm, Right Updated: 04/02/22 2250    [place] Draw [[id number]] Collected: 04/02/22 2113    Specimen: Blood Updated: 04/02/22  2217    Narrative:      The following orders were created for panel order [place] Draw.  Procedure                               Abnormality         Status                     ---------                               -----------         ------                     Green Top (Gel)[[id number]]                                  Final result               Lavender Top[[id number]]                                     Final result               Red Top[[id number]]                                          Final result               Light Blue Top[[id number]]                                   Final result                 Please view results for these tests on the individual orders.    Red Top [[id number]] Collected: 04/02/22 2113    Specimen: Blood Updated: 04/02/22 2217     Extra Tube Hold for add-ons.     Comment: Auto resulted.       Light Blue Top [[id number]] Collected: 04/02/22 2113    Specimen: Blood Updated: 04/02/22 2217     Extra Tube hold for add-on     Comment: Auto resulted       Green Top (Gel) [[id number]] Collected: 04/02/22 2113    Specimen: Blood Updated: 04/02/22 2217     Extra Tube Hold for add-ons.     Comment: Auto resulted.       Lavender Top [[id number]] Collected: 04/02/22 2113    Specimen: Blood Updated: 04/02/22 2217     Extra Tube hold for add-on     Comment: Auto resulted       TSH [[id number]]  (Normal) Collected: 04/02/22 2113    Specimen: Blood Updated: 04/02/22 2150     TSH 2.410 uIU/mL     Procalcitonin [[id number]]  (Abnormal) Collected: 04/02/22 2113    Specimen: Blood Updated: 04/02/22 2150     Procalcitonin 0.31 ng/mL     Narrative:      As a Marker for Sepsis (Non-Neonates):    1. <0.5 ng/mL represents a low risk of severe sepsis and/or septic shock.  2. >2 ng/mL represents a high risk of severe sepsis and/or septic shock.    As a Marker for Lower Respiratory Tract Infections that require antibiotic therapy:    PCT on Admission    Antibiotic Therapy       6-12 Hrs later    >0.5                 "Strongly Recommended  >0.25 - <0.5        Recommended   0.1 - 0.25          Discouraged              Remeasure/reassess PCT  <0.1                Strongly Discouraged     Remeasure/reassess PCT    As 28 day mortality risk marker: \"Change in Procalcitonin Result\" (>80% or <=80%) if Day 0 (or Day 1) and Day 4 values are available. Refer to http://www.[facility] [place]-pct-calculator.com    Change in PCT <=80%  A decrease of PCT levels below or equal to 80% defines a positive change in PCT test result representing a higher risk for 28-day all-cause mortality of patients diagnosed with severe sepsis for septic shock.    Change in PCT >80%  A decrease of PCT levels of more than 80% defines a negative change in PCT result representing a lower risk for 28-day all-cause mortality of patients diagnosed with severe sepsis or septic shock.       T4, Free [[id number]]  (Normal) Collected: 04/02/22 2113    Specimen: Blood Updated: 04/02/22 2149     Free T4 1.36 ng/dL     Narrative:      Results may be falsely increased if patient taking Biotin.      Comprehensive Metabolic Panel [[id number]]  (Abnormal) Collected: 04/02/22 2113    Specimen: Blood Updated: 04/02/22 2146     Glucose 97 mg/dL      BUN 13 mg/dL      Creatinine 1.11 mg/dL      Sodium 137 mmol/L      Potassium 2.6 mmol/L      Chloride 90 mmol/L      CO2 27.0 mmol/L      Calcium 10.6 mg/dL      Total Protein 8.0 g/dL      Albumin 3.60 g/dL      ALT (SGPT) 20 U/L      AST (SGOT) 28 U/L      Alkaline Phosphatase 149 U/L      Total Bilirubin 1.2 mg/dL      Globulin 4.4 gm/dL      A/G Ratio 0.8 g/dL      BUN/Creatinine Ratio 11.7     Anion Gap 20.0 mmol/L      eGFR 63.0 mL/min/1.73      Comment: National Kidney Foundation and American Society of Nephrology (ASN) Task Force recommended calculation based on the Chronic Kidney Disease Epidemiology Collaboration (CKD-EPI) equation refit without adjustment for race.       Narrative:      GFR Normal >60  Chronic Kidney Disease <60  Kidney " Failure <15      Magnesium [[id number]]  (Normal) Collected: 04/02/22 2113    Specimen: Blood Updated: 04/02/22 2138     Magnesium 1.7 mg/dL     CBC & Differential [[id number]]  (Abnormal) Collected: 04/02/22 2113    Specimen: Blood Updated: 04/02/22 2124    Narrative:      The following orders were created for panel order CBC & Differential.  Procedure                               Abnormality         Status                     ---------                               -----------         ------                     CBC Auto Differential[[id number]]        Abnormal            Final result                 Please view results for these tests on the individual orders.    CBC Auto Differential [[id number]]  (Abnormal) Collected: 04/02/22 2113    Specimen: Blood Updated: 04/02/22 2124     WBC 9.07 10*3/mm3      RBC 5.13 10*6/mm3      Hemoglobin 14.4 g/dL      Hematocrit 42.3 %      MCV 82.5 fL      MCH 28.1 pg      MCHC 34.0 g/dL      RDW 14.6 %      RDW-SD 44.0 fl      MPV 8.9 fL      Platelets 476 10*3/mm3      Neutrophil % 63.9 %      Lymphocyte % 26.9 %      Monocyte % 6.5 %      Eosinophil % 1.7 %      Basophil % 0.6 %      Immature Grans % 0.4 %      Neutrophils, Absolute 5.80 10*3/mm3      Lymphocytes, Absolute 2.44 10*3/mm3      Monocytes, Absolute 0.59 10*3/mm3      Eosinophils, Absolute 0.15 10*3/mm3      Basophils, Absolute 0.05 10*3/mm3      Immature Grans, Absolute 0.04 10*3/mm3      nRBC 0.0 /100 WBC         Imaging Results (Last 24 Hours)     Procedure Component Value Units Date/Time    CT Abdomen Pelvis With Contrast [[id number]] Resulted: 04/02/22 2212     Updated: 04/02/22 2218    XR Chest 1 View [[id number]] Collected: 04/02/22 2146     Updated: 04/02/22 2151    Narrative:      EXAM: XR CHEST 1 VW-     INDICATION: weakness     COMPARISON: 9/20/2021     FINDINGS:     Cardiac silhouette is normal in size. Chronic mild RIGHT hemidiaphragm  elevation with associated RIGHT basilar atelectasis. No pleural  effusion,  pneumothorax, or focal consolidation. No acute osseous  finding.       Impression:         No acute findings. Mild chronic RIGHT hemidiaphragm elevation with  presumed RIGHT basilar atelectasis.   This report was finalized on 04/02/2022 21:48 by Dr. Gomez Cárdenas MD.        I have personally reviewed and interpreted the radiology studies and ECG obtained at time of admission.     Assessment / Plan     Assessment:   Active Hospital Problems    Diagnosis    • **Intractable nausea and vomiting    • Hypokalemia    • UTI (urinary tract infection), bacterial    • Malfunction of nephrostomy tube (HCC)    • Essential (primary) hypertension    1.  Intractable nausea and vomiting plans admit the patient will start on a clear liquid diet if we can advance it we will start her on scheduled IV Reglan and IV PPI.  Consult GI for further recommendations.  We will also check a gallbladder ultrasound.  2.  Hypokalemia replace potassium we will also give her a dose of magnesium sulfate since she is borderline low.  Recheck labs in a.m.  3.  UTI culture blood and urine start IV Rocephin this may be contributing to her nausea and vomiting.  4.  Malfunctioning nephrostomy tube she is inquiring about getting it removed she does have hydronephrosis on that side will ask urology for their opinion.  5.  Hypertension resume home medications monitor BP.  6.  Nasal congestion likely from vomiting we will give her Afrin and Nasonex.  7.  Medications reviewed and resumed as appropriate.    Patient seen after midnight       Code Status/Advanced Care Plan: Full    The patient's surrogate decision maker is the patient's .     I discussed my findings and recommendations with the patient.    Estimated length of stay is 2-3 nights.     The patient was seen and examined by me on April 3, 2022 at 3:05 AM.    Electronically signed by Frank Ricks MD, 04/03/22, 04:25 CDT.

## 2022-04-03 NOTE — THERAPY EVALUATION
Patient Name: Namita Zabala  : 1975    MRN: [id number]                              Today's Date: 4/3/2022       Admit Date: 2022    Visit Dx:     ICD-10-CM ICD-9-CM   1. Hypokalemia  E87.6 276.8   2. Urinary tract infection with hematuria, site unspecified  N39.0 599.0    R31.9 599.70   3. Nausea and vomiting, unspecified vomiting type  R11.2 787.01   4. Lactic acidosis  E87.2 276.2     Patient Active Problem List   Diagnosis   • COVID-19   • Acute respiratory failure with hypoxia (HCC)   • Sepsis, unspecified organism ([facility])   • Acute pulmonary embolism ([facility])   • Chronic migraine   • MVP (mitral valve prolapse)   • Bilateral pneumonia   • Lactic acidosis   • Elevated transaminase level   • JUVENAL (acute kidney injury) ([facility])   • Ileus (HCC)   • Hyperkalemia   • Pelvic hematoma, female   • Acute blood loss anemia   • Cytokine release syndrome, grade 4   • Hypokalemia   • Anxiety   • Essential (primary) hypertension   • Myofascial pain syndrome   • Vitamin B12 deficiency   • Intractable nausea and vomiting   • UTI (urinary tract infection), bacterial   • Malfunction of nephrostomy tube ([facility])     Past Medical History:   Diagnosis Date   • Angina at rest ([facility])    • Migraine     Sees Pain Management for injections.    • MVP (mitral valve prolapse)    • Renal disorder    • Syncope      Past Surgical History:   Procedure Laterality Date   • BREAST BIOPSY Right     benign   • INSERTION HEMODIALYSIS CATHETER Left 2021    Procedure: HEMODIALYSIS CATHETER INSERTION;  Surgeon: Anders Kaur MD;  Location: [place] [address];  Service: Vascular;  Laterality: Left;   • TONSILLECTOMY        General Information     Row Name 22 0910          OT Time and Intention    Document Type evaluation  -CH     Mode of Treatment occupational therapy  -CH     Row Name 22 0910          General Information    Patient Profile Reviewed yes  -CH     Prior Level of Function  independent:;feeding;grooming;mod assist:;dressing;bathing  -     Existing Precautions/Restrictions fall  -     Barriers to Rehab medically complex;previous functional deficit  -     Row Name 04/03/22 0910          Occupational Profile    Reason for Services/Referral (Occupational Profile) n/v, UTI  -CH     Successful Occupations (Occupational Profile) prior to Covid in 08/2021 pt. was independent  -     Occupational History/Life Experiences (Occupational Profile) long medical hx of covid, PE, abdominal hematoma with resulting obstruction of her ureters & nephrostomy tubes  -     Performance Patterns (Occupational Profile) was having bed bath  -     Environmental Supports and Barriers (Occupational Profile) ramp  -     Row Name 04/03/22 0910          Living Environment    People in Home spouse  -     Row Name 04/03/22 0910          Home Main Entrance    Number of Stairs, Main Entrance none  -     Stair Railings, Main Entrance none  -     Row Name 04/03/22 0910          Stairs Within Home, Primary    Number of Stairs, Within Home, Primary none  -     Stair Railings, Within Home, Primary none  -     Row Name 04/03/22 0910          Cognition    Orientation Status (Cognition) oriented x 4  -CH     Row Name 04/03/22 0910          Safety Issues, Functional Mobility    Impairments Affecting Function (Mobility) balance;endurance/activity tolerance;strength;pain;other (see comments)  nausea  -           User Key  (r) = Recorded By, (t) = Taken By, (c) = Cosigned By    Initials Name Provider Type     Bobbi Nicole, OTR/L Occupational Therapist                 Mobility/ADL's     Row Name 04/03/22 0910          Bed Mobility    Bed Mobility rolling left;rolling right  -     Rolling Left [place] (Bed Mobility) minimum assist (75% patient effort);verbal cues  -     Rolling Right [place] (Bed Mobility) minimum assist (75% patient effort);verbal cues  -CH     Bed Mobility, Safety Issues  decreased use of arms for pushing/pulling;decreased use of legs for bridging/pushing  -     Assistive Device (Bed Mobility) bed rails  -     Row Name 04/03/22 0910          Transfers    Comment, (Transfers) pt. states she is not able to attempt at this time for fear of continued n/v  -CH     Row Name 04/03/22 0910          Functional Mobility    Functional Mobility- Comment pt. states she is not able to attempt at this time for fear of continued n/v  -[facility] Name 04/03/22 0910          Activities of Daily Living    BADL Assessment/Intervention grooming;lower body dressing  -     Row Name 04/03/22 0910          Grooming Assessment/Training    [place] Level (Grooming) set up;wash face, hands  -     Position (Grooming) sitting up in bed  -[facility] Name 04/03/22 0910          Lower Body Dressing Assessment/Training    [place] Level (Lower Body Dressing) dependent (less than 25% patient effort);don;socks  -     Position (Lower Body Dressing) supine  -           User Key  (r) = Recorded By, (t) = Taken By, (c) = Cosigned By    Initials Name Provider Type     Bobbi Nicole, OTR/L Occupational Therapist               Obj/Interventions     Row Name 04/03/22 0910          Sensory Assessment (Somatosensory)    Sensory Assessment (Somatosensory) UE sensation intact  -[facility] Name 04/03/22 0910          Vision Assessment/Intervention    Visual Impairment/Limitations WFL  -[facility] Name 04/03/22 0910          Range of Motion Comprehensive    General Range of Motion bilateral upper extremity ROM WNL  -[facility] Name 04/03/22 0910          Strength Comprehensive (MMT)    General Manual Muscle Testing (MMT) Assessment upper extremity strength deficits identified  -     Comment, General Manual Muscle Testing (MMT) Assessment R UE 4-/5, L shoulder 3+/5, L elbow/wrist/hand 4-/5  -     Row Name 04/03/22 0910          Motor Skills    Motor Skills functional endurance;other (see comments)  -      Row Name 04/03/22 0910          Balance    Comment, Balance Pt. refused EOB sitting 2' fear of continued n/v  -CH           User Key  (r) = Recorded By, (t) = Taken By, (c) = Cosigned By    Initials Name Provider Type    Bobbi Amaya, OTR/L Occupational Therapist               Goals/Plan     Row Name 04/03/22 0910          Transfer Goal 1 (OT)    Activity/Assistive Device (Transfer Goal 1, OT) bed-to-chair/chair-to-bed  -CH     [place] Level/Cues Needed (Transfer Goal 1, OT) maximum assist (25-49% patient effort)  x2  -CH     Time Frame (Transfer Goal 1, OT) long term goal (LTG);by discharge  -CH     Progress/Outcome (Transfer Goal 1, OT) goal ongoing  -     Row Name 04/03/22 0910          Dressing Goal 1 (OT)    Activity/Device (Dressing Goal 1, OT) upper body dressing  -CH     [place]/Cues Needed (Dressing Goal 1, OT) minimum assist (75% or more patient effort)  -CH     Time Frame (Dressing Goal 1, OT) long term goal (LTG);by discharge  -     Strategies/Barriers (Dressing Goal 1, OT) sitting EOB  -CH     Row Name 04/03/22 0910          Problem Specific Goal 1 (OT)    Problem Specific Goal 1 (OT) Pt. will sit EOB x 10 min at CGA to increase act gina & fxl balance for ADLs  -CH     Time Frame (Problem Specific Goal 1, OT) long term goal (LTG);by discharge  -CH     Progress/Outcome (Problem Specific Goal 1, OT) goal ongoing  -     Row Name 04/03/22 0910          Therapy Assessment/Plan (OT)    Planned Therapy Interventions (OT) activity tolerance training;adaptive equipment training;BADL retraining;functional balance retraining;patient/caregiver education/training;ROM/therapeutic exercise;strengthening exercise;occupation/activity based interventions;transfer/mobility retraining  -           User Key  (r) = Recorded By, (t) = Taken By, (c) = Cosigned By    Initials Name Provider Type    Bobbi Amaya, OTR/L Occupational Therapist               Clinical Impression     Row Name 04/03/22 0910  "         Pain Assessment    Pre/Posttreatment Pain Comment nausea, dizziness  -     Pain Intervention(s) Repositioned  -     Row Name 04/03/22 0910          Plan of Care Review    Plan of Care Reviewed With patient  -     Progress no change  -     Outcome Evaluation OT eval completed.  Pt. ix AxO & c/o fear of nausea/vomitting.  Ms. Zabala reported full & complicated medical hx including multiple rehab placements.  At this time the pt was only able to roll in bed d/t her fear of n/v.  Encouraged repositioning of HOB to fowlers vs. flat & change of position to reduce negative ramifications of immobility.  Ms. Zabala requires Max A/Dep A x 1 for LBD self care & toileting.  She is able to perform UB ADLs but would benefit ffrom cont'd OT tx to increase her activity level & improve her fxn.  -     Row Name 04/03/22 0910          Therapy Assessment/Plan (OT)    Patient/Family Therapy Goal Statement (OT) \"At some point I want to walk again\"  -     Rehab Potential (OT) fair, will monitor progress closely  -     Criteria for Skilled Therapeutic Interventions Met (OT) yes;skilled treatment is necessary  -     Therapy Frequency (OT) 5 times/wk  -     Predicted Duration of Therapy Intervention (OT) until DC  -     Row Name 04/03/22 0910          Therapy Plan Review/Discharge Plan (OT)    Anticipated Discharge Disposition (OT) skilled nursing facility;sub acute care setting;LT (long-term care [facility])  -     Row Name 04/03/22 0910          Positioning and Restraints    Pre-Treatment Position in bed  -     Post Treatment Position bed  -     In Bed fowlers;call light within reach;encouraged to call for assist;side rails up x2;notified [facility]  -           User Key  (r) = Recorded By, (t) = Taken By, (c) = Cosigned By    Initials Name Provider Type     Bobbi Nicole, OTR/L Occupational Therapist               Outcome Measures     Row Name 04/03/22 0910          How much help from another is " currently needed...    Putting on and taking off regular lower body clothing? 1  -CH     Bathing (including washing, rinsing, and drying) 2  -CH     Toileting (which includes using toilet bed pan or urinal) 1  -CH     Putting on and taking off regular upper body clothing 2  -CH     Taking care of personal grooming (such as brushing teeth) 4  -CH     Eating meals 4  -CH     AM-PAC 6 Clicks Score (OT) 14  -     Row Name 04/03/22 0910          Functional Assessment    Outcome Measure Options AM-PAC 6 Clicks Daily Activity (OT)  -           User Key  (r) = Recorded By, (t) = Taken By, (c) = Cosigned By    Initials Name Provider Type     Bobbi Nicole, OTR/L Occupational Therapist                Occupational Therapy Education                 Title: PT OT SLP Therapies (In Progress)     Topic: Occupational Therapy (In Progress)     Point: ADL training (Done)     Description:   Instruct learner(s) on proper safety adaptation and remediation techniques during self care or transfers.   Instruct in proper use of assistive devices.              Learning Progress Summary           Patient Acceptance, E,D, VU,NR by  at 4/3/2022 1009                   Point: Home exercise program (Not Started)     Description:   Instruct learner(s) on appropriate technique for monitoring, assisting and/or progressing therapeutic exercises/activities.              Learner Progress:  Not documented in this visit.          Point: Precautions (Not Started)     Description:   Instruct learner(s) on prescribed precautions during self-care and functional transfers.              Learner Progress:  Not documented in this visit.          Point: Body mechanics (Not Started)     Description:   Instruct learner(s) on proper positioning and spine alignment during self-care, functional mobility activities and/or exercises.              Learner Progress:  Not documented in this visit.                      User Key     Initials Effective Dates Name  Provider Type Discipline     06/16/21 -  Bobbi Nicole OTR/L Occupational Therapist OT              OT Recommendation and Plan  Planned Therapy Interventions (OT): activity tolerance training, adaptive equipment training, BADL retraining, functional balance retraining, patient/caregiver education/training, ROM/therapeutic exercise, strengthening exercise, occupation/activity based interventions, transfer/mobility retraining  Therapy Frequency (OT): 5 times/wk  Plan of Care Review  Plan of Care Reviewed With: patient  Progress: no change  Outcome Evaluation: OT eval completed.  Pt. ix AxO & c/o fear of nausea/vomitting.  Ms. Zabala reported full & complicated medical hx including multiple rehab placements.  At this time the pt was only able to roll in bed d/t her fear of n/v.  Encouraged repositioning of HOB to fowlers vs. flat & change of position to reduce negative ramifications of immobility.  Ms. Zabala requires Max A/Dep A x 1 for LBD self care & toileting.  She is able to perform UB ADLs but would benefit ffrom cont'd OT tx to increase her activity level & improve her fxn.     Time Calculation:    Time Calculation- OT     Row Name 04/03/22 0910             Time Calculation- OT    OT Start Time 0910  -      OT Stop Time 0955  -      OT Time Calculation (min) 45 min  -      OT Received On 04/03/22  -      OT Goal Re-Cert Due Date 04/13/22  -              Untimed Charges    OT Eval/Re-eval Minutes 45  -CH              Total Minutes    Untimed Charges Total Minutes 45  -CH       Total Minutes 45  -CH            User Key  (r) = Recorded By, (t) = Taken By, (c) = Cosigned By    Initials Name Provider Type     Bobbi Nicole OTR/L Occupational Therapist              Therapy Charges for Today     Code Description Service Date Service Provider Modifiers Qty    28654480207 HC OT EVAL HIGH COMPLEXITY 3 4/3/2022 Bobbi Nicole OTR/L GO 1               JILLIAN Bueno/JUWAN  4/3/2022

## 2022-04-03 NOTE — PLAN OF CARE
Goal Outcome Evaluation:  Plan of Care Reviewed With: patient        Progress: no change  Outcome Evaluation: A&Ox4. VSS. No c/o pain. Voiding-incontinent or per bedpan. C/O baseline BLE numbness. Con't K+ and mag replacement. Turn and reposition every 2hrs.

## 2022-04-03 NOTE — ED PROVIDER NOTES
Subjective   History of Present Illness    Patient is a 46-year-old female presenting to ED via EMS with weakness and vomiting.  PMH significant for MVP, history of angina, migraines. Medical history notable for COVID-19 infection in August with subsequent pulmonary emboli, anticoagulation, development of abdominal hematoma, obstruction of her ureters leading to kidney failure which required temporary dialysis that patient states she is no longer receiving. Patient states approximately 3 weeks ago she returned home after a long term staying in nursing facility secondary to complications from a ruptured bladder and nephrostomy tubes.  Patient states since returning home 3 weeks ago she has had problems with vomiting anytime she tries to take in food and now liquids as well.  Patient reports that she has not tried any oral intake today and has developed nausea but reports normally no nausea.  Patient describes increased pain along her abdominal incision over the past few days.  Patient did note she formally had 2 nephrostomy tubes and has since had resolution of her right-sided tube and has a partial left-sided nephrostomy.  Patient states that her  routinely changes her depends and she does not believe she has had any recent changes to her urine output or hematuria.  Patient denies any known fevers, chills, or diaphoresis.  Patient describes feelings of generalized weakness and presents for further evaluation at this time.    Records reviewed show patient last seen in ED on 1/18/2022 for physical deconditioning, pelvic hematoma, muscle weakness.    Patient was seen via telehealth with PCP on 3/22/2022 for intractable nausea vomiting, insomnia, pulmonary emboli, muscle spasm, decreased appetite, depression, anxiety.      Review of Systems   Constitutional: Negative for fever.   HENT: Negative.    Eyes: Negative.    Respiratory: Negative.    Cardiovascular: Negative.    Gastrointestinal: Positive for abdominal  pain and vomiting. Negative for constipation and diarrhea.   Genitourinary: Negative for decreased urine volume and hematuria.   Musculoskeletal: Negative.    Skin: Negative.    Neurological: Positive for weakness (generalized).   Psychiatric/Behavioral: Negative.    All other systems reviewed and are negative.      Past Medical History:   Diagnosis Date   • Angina at rest (HCC)    • Migraine     Sees Pain Management for injections.    • MVP (mitral valve prolapse)    • Renal disorder    • Syncope        Allergies   Allergen Reactions   • Coconut Unknown - High Severity   • Nuts Unknown - High Severity   • Penicillins        Past Surgical History:   Procedure Laterality Date   • BREAST BIOPSY Right 2011    benign   • INSERTION HEMODIALYSIS CATHETER Left 9/16/2021    Procedure: HEMODIALYSIS CATHETER INSERTION;  Surgeon: Anders Kaur MD;  Location: [place] [address];  Service: Vascular;  Laterality: Left;   • TONSILLECTOMY         Family History   Problem Relation Age of Onset   • Colon cancer Maternal Aunt 52   • Fibromyalgia Mother    • Heart disease Mother    • Hypertension Mother    • Hodgkin's lymphoma Paternal Grandmother    • Ovarian cancer Neg Hx    • Breast cancer Neg Hx        Social History     Socioeconomic History   • Marital status:    Tobacco Use   • Smoking status: Never Smoker   • Smokeless tobacco: Never Used   Substance and Sexual Activity   • Alcohol use: No   • Drug use: No           Objective   Physical Exam  Vitals and nursing note reviewed.   Constitutional:       General: She is not in acute distress.     Appearance: Normal appearance. She is well-developed and well-groomed. She is ill-appearing (chronically ill appearing).   HENT:      Head: Normocephalic.      Mouth/Throat:      Mouth: Mucous membranes are moist.      Pharynx: Oropharynx is clear.   Eyes:      Conjunctiva/sclera: Conjunctivae normal.      Pupils: Pupils are equal, round, and reactive to light.    Cardiovascular:      Rate and Rhythm: Tachycardia present.   Pulmonary:      Effort: Pulmonary effort is normal. No respiratory distress.      Breath sounds: Normal breath sounds.   Chest:      Chest wall: No tenderness.   Abdominal:      General: Bowel sounds are normal.      Palpations: Abdomen is soft.      Tenderness: There is abdominal tenderness (periumbilical). There is no right CVA tenderness.      Comments: Left-sided nephrostomy tube in place which is well-appearing with no drainage, no tenderness, no swelling overlying the area.    Healing vertical midline surgical incision with central portion still healing with no dehiscence    Musculoskeletal:      Cervical back: Normal range of motion and neck supple.      Right lower leg: No edema.      Left lower leg: No edema.   Skin:     General: Skin is warm and dry.   Neurological:      Mental Status: She is alert and oriented to person, place, and time.      Comments: Decreased strength symmetrically to bilateral LE which patient reports is at baseline   Psychiatric:         Mood and Affect: Mood normal.         Behavior: Behavior normal. Behavior is cooperative.         Procedures           ED Course                                                 MDM  Number of Diagnoses or Management Options     Amount and/or Complexity of Data Reviewed  Clinical lab tests: reviewed and ordered  Tests in the radiology section of CPT®: reviewed and ordered  Tests in the medicine section of CPT®: ordered and reviewed  Decide to obtain previous medical records or to obtain history from someone other than the patient: yes  Review and summarize past medical records: yes  Discuss the patient with other providers: yes (Dr. Tommy Linares (attending)  Dr. Ricks (hospitalist)  )    Patient Progress  Patient progress: stable    Patient is a 46-year-old female presenting to ED via EMS with weakness and vomiting.  PMH significant for MVP, history of angina, migraines. Medical  history notable for COVID-19 infection in August with subsequent pulmonary emboli, anticoagulation, development of abdominal hematoma, obstruction of her ureters leading to kidney failure which required temporary dialysis that patient states she is no longer receiving.  CBC with thrombocytosis 476 and otherwise unremarkable including normal white blood cell count 9.07.  CMP with hypokalemia 2.6, creatinine 1.11, normal GFR at 63, anion gap of 20, alk phos 149.  Magnesium WNL.  Procalcitonin elevated at 0.31.  Lactic acid elevated at 2.8. Thyroid hormones WNL.  Urinalysis showed large leukocytes, TNTC WBC, 4+ bacteria, TNTC RBC, negative nitrites. CT of the abdomen and pelvis showed: Left-sided internal/external nephroureteral stent, hydronephrosis with ureteral style thickening and bladder mucosal enhancement which may be reactive versus infectious, no perinephric fluid collection, no obstructing stones, hepatic steatosis, remainder of organs unremarkable, no acute abnormality along the GI tract.  Discussed need for admission for further evaluation and treatment patient is amenable at this time.  Case was discussed with Dr. Ricks, hospitalist, who will contact the patient for admission under his services with no further recommendations or request.  Case discussed with Dr. Tommy Linares who is in agreement no further recommendations.    Final diagnoses:   Hypokalemia   Urinary tract infection with hematuria, site unspecified   Nausea and vomiting, unspecified vomiting type   Lactic acidosis       ED Disposition  ED Disposition     ED Disposition   Decision to Admit    Condition   --    Comment   Level of Care: Med/Surg [1]   Diagnosis: Hypokalemia [570370]   Admitting Physician: CORNELIUS RICKS [[id number]]               No follow-up provider specified.       Medication List      No changes were made to your prescriptions during this visit.          Abisai Becerra PA-C  04/03/22 0052

## 2022-04-03 NOTE — CONSULTS
[facility]    Inpatient Gastroenterology Service    Report of Consultation    Jose Babin MD, FACP    4/3/2022  18:31 CDT    Patient: Namita Zabala  : 1975  MRN: [id number]  Date of Admission: 2022    Consultation received from:    Saul clerk: routine      Consult received:  .@[id number]        Patient referred for evaluation of nausea, emesis, abdominal pain    History is obtained from the patient, the family, the EMR, and appears generally reliable, although the patient is unclear about many details        Chief complaint  -unable to eat        History of Present Illness  -46 y.o. female reports a three week history of postprandial emesis, typically without nausea.  She has some abdominal discomfort.  She has had a protracted and complex clinical course post-SARS-2.        Gastroenterology Review of Systems:  -no report of hematemesis, hematochezia, melena, diarrhea, constipation, pyrosis, regurgitation, dysphagia, odynophagia, altered bowel habit, change in stool, choluria, acholic stool, or jaundice        Past Medical History:   Diagnosis Date   • Angina at rest (HCC)    • Migraine     Sees Pain Management for injections.    • MVP (mitral valve prolapse)    • Renal disorder    • Syncope    · SARS-2  · PUD  · Pulmonary embolism        Past Surgical History:   Procedure Laterality Date   • BREAST BIOPSY Right     benign   • INSERTION HEMODIALYSIS CATHETER Left 2021    Procedure: HEMODIALYSIS CATHETER INSERTION;  Surgeon: Anders Kaur MD;  Location: [place] [address];  Service: Vascular;  Laterality: Left;   • TONSILLECTOMY             Past Endoscopy History  -no prior EGD  -no prior colonoscopy        Past Anesthesia/Sedation History  -no known prior issues with anesthesia or sedation        Past Procedural History  -no prior heart cath        Family History  -CRC (aunt); otherwise, no report of family history of: gastric cancer, pancreatic cancer,  colorectal cancer, breast cancer, ovarian cancer, uterine cancer, colorectal polyps, inflammatory bowel disease, Crohn's disease, ulcerative colitis, celiac disease, peptic ulcer disease, pancreatitis, hepatitis, cirrhosis        Social History  -  -tobacco use: denies   -ethanol use: denies  -illicit/recreational substance and THC use: denies        Medications  -see EMR lists  -Home medications of Record:  Calcium Carbonate-Simethicone, FLUoxetine, butalbital-acetaminophen-caffeine, eszopiclone, methocarbamol, metoclopramide, ondansetron ODT, oxymetazoline, pantoprazole, prochlorperazine, promethazine, rivaroxaban, rizatriptan, and traZODone    Last dose of rivaroxaban is unknown        Physical Exam  Vital Signs:  Temp:  [97.8 °F (36.6 °C)-98.4 °F (36.9 °C)] 98.4 °F (36.9 °C)  Heart Rate:  [102-116] 102  Resp:  [16-28] 16  BP: (115-133)/() 115/74    General  -appears stated age  -no acute distress  -no outstanding physical abnormality    HEENT  -normocephalic  -atraumatic  -no drainage  -no bleeding  -mucosa appears moist     Neurological  -alert  -oriented  -normal speech  -no slowed response  -no tremor  -no obvious focal deficit  -moves all extremities without obvious abnormality    Psychiatric  -mildly dysphoric mood  -appropriate responses to questions  -no unusual behaviors noted    Skin (evaluation limited to exposed skin)  -dry  -no diaphoresis  -no icterus  -no rash     Neck  -supple  -normal passive ROM  -no JVD    Pulmonary  -normal respiratory effort  -no respiratory distress  -no stridor    Cardiovascular  -normal rate  -regular rhythm    Abdomen  -benign  -soft  -protuberant        Laboratory of Note  -see EMR  -hypokalemic with [Mg] lower end of normal        Imaging of Note  -see EMR  Narrative & Impression   EXAMINATION:   CT ABDOMEN PELVIS W CONTRAST-  4/3/2022 7:43 AM CDT     HISTORY: CT ABDOMEN AND PELVIS WITH CONTRAST 4/2/2022 10:09 PM CDT     HISTORY: Vomiting     COMPARISON:  October 15, 2021.      DLP: 249 mGy cm Automated exposure control was also utilized to decrease  patient radiation dose.     TECHNIQUE: Following the intravenous administration of contrast, helical  CT tomographic images of the abdomen and pelvis were acquired. Coronal  reformatted images were also provided for review.      FINDINGS:   The lung bases and base of the heart are unremarkable.      LIVER: Decreased CT attenuation liver is noted consistent with hepatic  steatosis..      BILIARY SYSTEM: The gallbladder is unremarkable. No intrahepatic or  extrahepatic ductal dilatation.      PANCREAS: No focal pancreatic lesion.      SPLEEN: Unremarkable.      KIDNEYS AND ADRENALS: Adrenal glands are visualized. The right renal  contour is unremarkable.     The left renal contour is visualized. A percutaneous left nephrostomy  catheter is present. A double pigtail left ureteral catheters present  satisfactorily position.. There may be mild enhancement of the left  renal pelvis. This may be reactive or possibly infectious. There is no  perinephric stranding. The right ureters unremarkable.     RETROPERITONEUM: No mass, lymphadenopathy or hemorrhage.      GI TRACT: No evidence of obstruction or bowel wall thickening. The  appendix is visualized and unremarkable.     OTHER: There is no mesenteric mass, lymphadenopathy or fluid collection.  The abdominopelvic vasculature is patent. The osseous structures and  soft tissues demonstrate no worrisome lesions.     PELVIS: No mass lesion, fluid collection or significant lymphadenopathy  is seen in the pelvis. The urinary bladder wall enhances. This may be  reactive. This may be inflammation..     IMPRESSION:  1. Left-sided external nephrostomy stent is present. Left internal  double pigtail ureteral stent catheters present. Mild enhancement of the  wall of the left renal pelvis and bladder is noted possibly an  infectious etiology.  2. Hepatic steatosis.     These images initially  reviewed by stat rad at 2242 hours.         This report was finalized on 04/03/2022 07:48 by Dr. Nadeem Marshall MD.             Assessment  -post prandial emesis, intractable.  Only occasional nausea.  Modest improvement since admission and initiating treatment for UTI.  Patient is on Xarelto.  -endoscopy in patients on DAOACs: endoscopic interventions begin to return to effectiveness after 48 hours (96 for Pradaxa), and should be at near normal functionality after 72 hours (120/Pradaxa) off of the drug.  In addition, during the first 48--72 hours, the potential for worsening intra-lumenal bleeding and/or causing other bleeding undetectable by endoscopy (I.e., hemothorax, hemoperitoneum) is felt to be prohibitive.  If bleeding is persistent at 48 hours, endoscopy can be attempted at that time.  Otherwise, would wait until post-72 hours.  -UTI        Recommendations  -consider ultrasound of the abdomen, possibly with HIDA to follow depending upon result and clinical course  -EGD would be reasonable, but she would need to be off of Xarelto for 72 hours prior.  I am not sure if this is practicable given her history of pulmonary embolism.  -would change metoclopramide to TID schedule to minimize tachyphylaxis.  5 mg doses may be as effective as 10 mg, also with less tachyphylaxis.  -avoid agents toxic to GI tract mucosa, anticoagulant/antiplatelet agents, as clinically practicable          Thank you for allowing us to participate in the care of this patient.    Sincerely,    SHREYA Babin MD, [facility]  [facility] Inpatient Gastroenterology Service

## 2022-04-03 NOTE — PLAN OF CARE
Goal Outcome Evaluation:  Plan of Care Reviewed With: patient        Progress: no change  Outcome Evaluation: Pt had no c/o pain this shift. Safety maintained. Pt c/o difficulty hearing, MD notified and ear drops ordered. IVF infusing per MD order. Voiding incont via purewick. Partial nep tube in place to left flank. Pt currently bedbound. C/o nausea occasionally throughout shift. Multiple runs of K administered this shift, awaiting K redraw. Positive BC result received from lab, MD paged awaiting return phone call. Cont to monitor,

## 2022-04-03 NOTE — PROGRESS NOTES
"Patient was admitted after midnight by Dr. Ricks.    Discussed with her nurse, [place].     She presented with complaints of intractable nausea and vomiting.      Her medical problems started last August when she developed COVID-19 pneumonia and had numerous complications thereafter.  She developed an abdominal hematoma and had to go on dialysis secondary to extrinsic compression of her urinary system.  She was transferred from here to [facility] and required exploratory laparotomy.  She ended up having bilateral percutaneous nephrostomy tubes and also ended up with a left ureteral stent.  She states that her main urologist is Dr. Rina Rey.  She states that she stopped producing urine from her percutaneous nephrostomy tube recently and that there are plans for it to be removed.  She states that she had gotten in touch with [place] to see if someone here could do it so she would not have to travel.    When she left [place], she spent considerable time at a skilled nursing facility in [place], [place].  She states that she has only been home a few weeks.  She was admitted to [facility] [place], operated by [facility] in [place] on 3/9 for electrolyte abnormalities.    Renal function stable today with a creatinine 0.95.  Dr. Ricks is treating a urinary tract infection with ceftriaxone.  She had a multidrug-resistant Klebsiella in October 2021.  I would like [facility]. Lactic acidosis improved after fluids and antibiotics.  Down to 1.7 from 2.8.  Urine does appear infected with too numerous to count white blood cells, 4+ bacteria and large leukocyte esterase.  The sample is also bloody.  Procalcitonin elevated at 0.31.     Replace potassium aggressively. Multiple runs ordered and it is also in her fluids. \"I have always had potassium problems.\"    Dr. Ricks asked for a GI opinion.  Dr. Babin to evaluate.  Not sure that anything will be necessary from his standpoint.  Her nausea and " vomiting could simply be related to her untreated urinary issues.    She is complaining of sinus congestion and her ears have been painful.  Flonase has not helped.  Corticosporin eardrops.    Active Hospital Problems    Diagnosis    • **Intractable nausea and vomiting    • UTI (urinary tract infection), bacterial    • Sepsis secondary to UTI (HCC)    • Lactic acidosis    • Hypokalemia    • Malfunction of nephrostomy tube (HCC)    • Essential (primary) hypertension        Electronically signed by Fermin Mcclure DO, 04/03/22, 2:38 PM CDT.

## 2022-04-03 NOTE — PLAN OF CARE
Goal Outcome Evaluation:  Plan of Care Reviewed With: patient        Progress: no change  Outcome Evaluation: OT eval completed.  Pt. ix AxO & c/o fear of nausea/vomitting.  Ms. Zabala reported full & complicated medical hx including multiple rehab placements.  At this time the pt was only able to roll in bed d/t her fear of n/v.  Encouraged repositioning of HOB to fowlers vs. flat & change of position to reduce negative ramifications of immobility.  Ms. Zabala requires Max A/Dep A x 1 for LBD self care & toileting.  She is able to perform UB ADLs but would benefit ffrom cont'd OT tx to increase her activity level & improve her fxn.

## 2022-04-04 LAB
ANION GAP SERPL CALCULATED.3IONS-SCNC: 14 MMOL/L (ref 5–15)
BUN SERPL-MCNC: 8 MG/DL (ref 6–20)
BUN/CREAT SERPL: 8.9 (ref 7–25)
CALCIUM SPEC-SCNC: 7.8 MG/DL (ref 8.6–10.5)
CHLORIDE SERPL-SCNC: 109 MMOL/L (ref 98–107)
CO2 SERPL-SCNC: 18 MMOL/L (ref 22–29)
CREAT SERPL-MCNC: 0.9 MG/DL (ref 0.57–1)
DEPRECATED RDW RBC AUTO: 48.7 FL (ref 37–54)
EGFRCR SERPLBLD CKD-EPI 2021: 81 ML/MIN/1.73
ERYTHROCYTE [DISTWIDTH] IN BLOOD BY AUTOMATED COUNT: 15.5 % (ref 12.3–15.4)
GLUCOSE SERPL-MCNC: 115 MG/DL (ref 65–99)
HCT VFR BLD AUTO: 29.2 % (ref 34–46.6)
HGB BLD-MCNC: 9.6 G/DL (ref 12–15.9)
MAGNESIUM SERPL-MCNC: 2 MG/DL (ref 1.6–2.6)
MCH RBC QN AUTO: 28.3 PG (ref 26.6–33)
MCHC RBC AUTO-ENTMCNC: 32.9 G/DL (ref 31.5–35.7)
MCV RBC AUTO: 86.1 FL (ref 79–97)
PHOSPHATE SERPL-MCNC: 1.2 MG/DL (ref 2.5–4.5)
PLATELET # BLD AUTO: 293 10*3/MM3 (ref 140–450)
PMV BLD AUTO: 9 FL (ref 6–12)
POTASSIUM SERPL-SCNC: 3.7 MMOL/L (ref 3.5–5.2)
RBC # BLD AUTO: 3.39 10*6/MM3 (ref 3.77–5.28)
SODIUM SERPL-SCNC: 141 MMOL/L (ref 136–145)
WBC NRBC COR # BLD: 7.29 10*3/MM3 (ref 3.4–10.8)

## 2022-04-04 PROCEDURE — 80048 BASIC METABOLIC PNL TOTAL CA: CPT | Performed by: INTERNAL MEDICINE

## 2022-04-04 PROCEDURE — 85027 COMPLETE CBC AUTOMATED: CPT | Performed by: INTERNAL MEDICINE

## 2022-04-04 PROCEDURE — 84100 ASSAY OF PHOSPHORUS: CPT | Performed by: INTERNAL MEDICINE

## 2022-04-04 PROCEDURE — 25010000002 ONDANSETRON PER 1 MG: Performed by: INTERNAL MEDICINE

## 2022-04-04 PROCEDURE — 83735 ASSAY OF MAGNESIUM: CPT | Performed by: INTERNAL MEDICINE

## 2022-04-04 PROCEDURE — 25010000002 ERTAPENEM PER 500 MG: Performed by: INTERNAL MEDICINE

## 2022-04-04 PROCEDURE — 25010000002 METOCLOPRAMIDE PER 10 MG: Performed by: INTERNAL MEDICINE

## 2022-04-04 PROCEDURE — 25010000002 SODIUM CHLORIDE 0.9 % WITH KCL 20 MEQ 20-0.9 MEQ/L-% SOLUTION: Performed by: INTERNAL MEDICINE

## 2022-04-04 RX ORDER — METOCLOPRAMIDE HYDROCHLORIDE 5 MG/ML
5 INJECTION INTRAMUSCULAR; INTRAVENOUS EVERY 6 HOURS
Status: DISCONTINUED | OUTPATIENT
Start: 2022-04-04 | End: 2022-04-09

## 2022-04-04 RX ADMIN — Medication 10 ML: at 09:21

## 2022-04-04 RX ADMIN — Medication 2 SPRAY: at 09:21

## 2022-04-04 RX ADMIN — METOCLOPRAMIDE HYDROCHLORIDE 10 MG: 5 INJECTION INTRAMUSCULAR; INTRAVENOUS at 05:31

## 2022-04-04 RX ADMIN — ONDANSETRON 4 MG: 2 INJECTION INTRAMUSCULAR; INTRAVENOUS at 21:20

## 2022-04-04 RX ADMIN — FLUOXETINE HYDROCHLORIDE 40 MG: 20 CAPSULE ORAL at 09:21

## 2022-04-04 RX ADMIN — PANTOPRAZOLE SODIUM 40 MG: 40 INJECTION, POWDER, FOR SOLUTION INTRAVENOUS at 09:21

## 2022-04-04 RX ADMIN — POTASSIUM CHLORIDE AND SODIUM CHLORIDE 100 ML/HR: 900; 150 INJECTION, SOLUTION INTRAVENOUS at 14:30

## 2022-04-04 RX ADMIN — NEOMYCIN SULFATE, POLYMYXIN B SULFATE AND HYDROCORTISONE 4 DROP: 10; 3.5; 1 SUSPENSION/ DROPS AURICULAR (OTIC) at 23:22

## 2022-04-04 RX ADMIN — Medication 10 ML: at 21:08

## 2022-04-04 RX ADMIN — FLUTICASONE PROPIONATE 2 SPRAY: 50 SPRAY, METERED NASAL at 09:21

## 2022-04-04 RX ADMIN — ERTAPENEM SODIUM 1 G: 1 INJECTION, POWDER, LYOPHILIZED, FOR SOLUTION INTRAMUSCULAR; INTRAVENOUS at 14:47

## 2022-04-04 RX ADMIN — POTASSIUM PHOSPHATE, MONOBASIC AND POTASSIUM PHOSPHATE, DIBASIC 30 MMOL: 224; 236 INJECTION, SOLUTION, CONCENTRATE INTRAVENOUS at 16:20

## 2022-04-04 RX ADMIN — METOCLOPRAMIDE HYDROCHLORIDE 5 MG: 5 INJECTION INTRAMUSCULAR; INTRAVENOUS at 23:22

## 2022-04-04 RX ADMIN — NEOMYCIN SULFATE, POLYMYXIN B SULFATE AND HYDROCORTISONE 4 DROP: 10; 3.5; 1 SUSPENSION/ DROPS AURICULAR (OTIC) at 11:42

## 2022-04-04 RX ADMIN — RIVAROXABAN 20 MG: 20 TABLET, FILM COATED ORAL at 21:10

## 2022-04-04 RX ADMIN — POTASSIUM CHLORIDE AND SODIUM CHLORIDE 100 ML/HR: 900; 150 INJECTION, SOLUTION INTRAVENOUS at 04:47

## 2022-04-04 RX ADMIN — NEOMYCIN SULFATE, POLYMYXIN B SULFATE AND HYDROCORTISONE 4 DROP: 10; 3.5; 1 SUSPENSION/ DROPS AURICULAR (OTIC) at 17:27

## 2022-04-04 RX ADMIN — METOCLOPRAMIDE HYDROCHLORIDE 5 MG: 5 INJECTION INTRAMUSCULAR; INTRAVENOUS at 17:27

## 2022-04-04 RX ADMIN — TRAZODONE HYDROCHLORIDE 100 MG: 100 TABLET ORAL at 21:08

## 2022-04-04 RX ADMIN — METOCLOPRAMIDE HYDROCHLORIDE 10 MG: 5 INJECTION INTRAMUSCULAR; INTRAVENOUS at 11:42

## 2022-04-04 RX ADMIN — NEOMYCIN SULFATE, POLYMYXIN B SULFATE AND HYDROCORTISONE 4 DROP: 10; 3.5; 1 SUSPENSION/ DROPS AURICULAR (OTIC) at 05:31

## 2022-04-04 RX ADMIN — PANTOPRAZOLE SODIUM 40 MG: 40 INJECTION, POWDER, FOR SOLUTION INTRAVENOUS at 21:08

## 2022-04-04 RX ADMIN — FLUTICASONE PROPIONATE 2 SPRAY: 50 SPRAY, METERED NASAL at 21:09

## 2022-04-04 RX ADMIN — Medication 2 SPRAY: at 21:09

## 2022-04-04 NOTE — PROGRESS NOTES
[facility] Medicine Services  INPATIENT PROGRESS NOTE    Patient Name: Namita Zabala  Date of Admission: 4/2/2022  Today's Date: 04/04/22  Length of Stay: 0  Primary Care Physician: David Lara MD    Subjective   Chief Complaint: Nausea.   HPI   Still very nauseous, but no recent vomiting.    Still awaiting her morning lab draw. Sammi called down and they will be coming up to do it.     GI recommends abdominal ultrasound and possible HIDA scan.  Placed on Reglan.    We will get an opinion from urology today about her percutaneous nephrostomy tube and ureteral stent in the face of recurrent urinary tract infection.    Still complaining about her ears feeling full.  I obtained an otoscope today and did not see anything significant on exam.    Review of Systems   All pertinent negatives and positives are as above. All other systems have been reviewed and are negative unless otherwise stated.     Objective    Temp:  [97.5 °F (36.4 °C)-98.4 °F (36.9 °C)] 98 °F (36.7 °C)  Heart Rate:  [] 102  Resp:  [16-18] 18  BP: (104-123)/(68-84) 114/73  Physical Exam  Constitutional:       Appearance: She is well-developed.      Comments: Up in bed.  No distress.  No family present.  Discussed with her nurse, Sammi.   HENT:      Head: Normocephalic and atraumatic.      Right Ear: Tympanic membrane, ear canal and external ear normal. There is no impacted cerumen.      Left Ear: Tympanic membrane, ear canal and external ear normal. There is no impacted cerumen.   Eyes:      Conjunctiva/sclera: Conjunctivae normal.      Pupils: Pupils are equal, round, and reactive to light.   Neck:      Vascular: No JVD.   Cardiovascular:      Rate and Rhythm: Normal rate and regular rhythm.      Heart sounds: Normal heart sounds. No murmur heard.    No friction rub. No gallop.   Pulmonary:      Effort: Pulmonary effort is normal. No respiratory distress.      Breath sounds: Normal breath  sounds. No wheezing or rales.   Chest:      Chest wall: No tenderness.   Abdominal:      General: Bowel sounds are normal. There is no distension.      Palpations: Abdomen is soft.      Tenderness: There is no abdominal tenderness.   Musculoskeletal:         General: No tenderness or deformity. Normal range of motion.      Cervical back: Neck supple.   Skin:     General: Skin is warm and dry.      Findings: No rash.   Neurological:      General: No focal deficit present.      Mental Status: She is alert and oriented to person, place, and time.      Cranial Nerves: No cranial nerve deficit.      Motor: Weakness present. No abnormal muscle tone.      Deep Tendon Reflexes: Reflexes normal.   Psychiatric:      Comments: Flat affect, but conversational.       Results Review:  I have reviewed the labs, radiology results, and diagnostic studies.    Laboratory Data:   Results from last 7 days   Lab Units 04/03/22  0713 04/02/22  2113   WBC 10*3/mm3 10.11 9.07   HEMOGLOBIN g/dL 11.4* 14.4   HEMATOCRIT % 32.8* 42.3   PLATELETS 10*3/mm3 325 476*     Results from last 7 days   Lab Units 04/03/22  1824 04/03/22  0713 04/02/22  2113   SODIUM mmol/L  --  137 137   POTASSIUM mmol/L 3.5 2.5* 2.6*   CHLORIDE mmol/L  --  97* 90*   CO2 mmol/L  --  25.0 27.0   BUN mg/dL  --  13 13   CREATININE mg/dL  --  0.95 1.11*   CALCIUM mg/dL  --  8.7 10.6*   BILIRUBIN mg/dL  --  0.6 1.2   ALK PHOS U/L  --  100 149*   ALT (SGPT) U/L  --  13 20   AST (SGOT) U/L  --  18 28   GLUCOSE mg/dL  --  100* 97     Culture Data:   Blood Culture   Date Value Ref Range Status   04/02/2022 No growth at 24 hours  Preliminary   04/02/2022 Abnormal Stain (C)  Preliminary     Urine Culture   Date Value Ref Range Status   04/02/2022 Scant growth (1+) Gram Negative Bacilli (A)  Preliminary     I have reviewed the patient's current medications.     Assessment/Plan     Active Hospital Problems    Diagnosis    • **Intractable nausea and vomiting    • UTI (urinary tract  infection), bacterial    • Sepsis secondary to UTI (HCC)    • Lactic acidosis    • Hypokalemia    • Malfunction of nephrostomy tube (HCC)    • Essential (primary) hypertension      Plan:  The patient was admitted on 4/3 by Dr. Ricks.  She presented with complaints of intractable nausea and vomiting.  Her medical problems started last August when she developed COVID-19 pneumonia and had numerous complications thereafter.  She developed an abdominal hematoma and had to go on dialysis secondary to extrinsic compression of her urinary system.  She was transferred from here to [facility] and required exploratory laparotomy.  She ended up having bilateral percutaneous nephrostomy tubes and also ended up with a left ureteral stent.  She states that her main urologist is Dr. Rina Rey.  She states that she stopped producing urine from her percutaneous nephrostomy tube recently and that there are plans for it to be removed.  She states that she had gotten in touch with [facility] to see if someone here could do it so she would not have to travel.     When she left [facility], she spent considerable time at a skilled nursing facility in [place], [place].  She states that she has only been home a few weeks.  She was admitted to [facility] in [place] on 3/9 for electrolyte abnormalities.     Renal function stable yesterday with a creatinine 0.95.  Dr. Ricks began treating a urinary tract infection with ceftriaxone.  She had a multidrug-resistant Klebsiella in October 2021.  I transitioned her to [facility] on 4/3. Down to 1.7 from 2.8 after fluids.  Urine does appear infected with too numerous to count white blood cells, 4+ bacteria and large leukocyte esterase.  The sample was also bloody.  Urine culture growing gram-negative bacilli so far.  Lactic acidosis improved after fluids and antibiotics.   Procalcitonin elevated at 0.31.  I would like for her to be evaluated by urology.  Dr. Dyer is  "familiar with her previous problems as he saw her last year.     Blood cultures are likely going to be a contaminant with coagulase-negative Staphylococcus.  Follow to completion.    Replace potassium aggressively. Multiple runs ordered and it is also in her fluids. \"I have always had potassium problems.\"  Potassium 3.5 last night.  For some reason, morning labs have not been drawn.     Dr. Ricks asked for a GI opinion.  Dr. Babin saw and recommended an abdominal ultrasound and perhaps a HIDA scan thereafter.  Reglan 5 mg every 6 hours.  No significant plans for endoscopy at this time. Her nausea and vomiting could simply be related to her untreated urinary issues.     She is complaining of sinus congestion and her ears have been painful.  Flonase has not helped.  Corticosporin eardrops.     DVT prophylaxis is achieved by virtue of being on Xarelto.    Discharge Planning: I expect the patient to be discharged to home in 2-3 days.    Electronically signed by Fermin Mcclure DO, 04/04/22, 12:03 CDT.    "

## 2022-04-04 NOTE — PLAN OF CARE
Goal Outcome Evaluation:  Plan of Care Reviewed With: patient        Progress: no change  Outcome Evaluation: Pt a&ox4. Resting between care. C/o nausea but has not vomited so far this shift. IVF infusing. IV abx given. Critical phosphorus- IV replacement to be given per order. Voiding-purewick. C/o ear discomfort- sched ear drops given. Tele-tachy. VSS. Safety maintained- call light within reach.

## 2022-04-04 NOTE — PROGRESS NOTES
Malnutrition Severity Assessment    Patient Name:  Namita Zabala  YOB: 1975  MRN: [id number]  Admit Date:  4/2/2022    Patient meets criteria for : Severe Malnutrition  Malnutrition Severity Assessment  Malnutrition Type: Chronic Disease - Related Malnutrition  Malnutrition Type (last 8 hours)     Malnutrition Severity Assessment     Row Name 04/04/22 1528       Malnutrition Severity Assessment    Malnutrition Type Chronic Disease - Related Malnutrition    Row Name 04/04/22 1528       Insufficient Energy Intake     Insufficient Energy Intake Findings Severe    Insufficient Energy Intake  <75% of est. energy requirement for > or equal to 3 months    Row Name 04/04/22 1528       Unintentional Weight Loss     Unintentional Weight Loss Findings Severe    Unintentional Weight Loss  Weight loss greater than 20% in one year  Weight loss: 86 lb since last September    Row Name 04/04/22 1528       Criteria Met (Must meet criteria for severity in at least 2 of these categories: M Wasting, Fat Loss, Fluid, Secondary Signs, Wt. Status, Intake)    Patient meets criteria for  Severe Malnutrition                Electronically signed by:  Morelia Warner RD  04/04/22 15:33 CDT

## 2022-04-04 NOTE — THERAPY DISCHARGE NOTE
Acute Care - Occupational Therapy Discharge Summary  [facility]     Patient Name: Namita Zabala  : 1975  MRN: [id number]    Today's Date: 2022                 Admit Date: 2022        OT Recommendation and Plan    Visit Dx:    ICD-10-CM ICD-9-CM   1. Hypokalemia  E87.6 276.8   2. Urinary tract infection with hematuria, site unspecified  N39.0 599.0    R31.9 599.70   3. Nausea and vomiting, unspecified vomiting type  R11.2 787.01   4. Lactic acidosis  E87.2 276.2                OT Rehab Goals     Row Name 22 1500             Transfer Goal 1 (OT)    Activity/Assistive Device (Transfer Goal 1, OT) bed-to-chair/chair-to-bed  -TS      [place] Level/Cues Needed (Transfer Goal 1, OT) maximum assist (25-49% patient effort)  -TS      Time Frame (Transfer Goal 1, OT) long term goal (LTG);by discharge  -TS      Progress/Outcome (Transfer Goal 1, OT) goal not met  -TS              Dressing Goal 1 (OT)    Activity/Device (Dressing Goal 1, OT) upper body dressing  -TS      [place]/Cues Needed (Dressing Goal 1, OT) minimum assist (75% or more patient effort)  -TS      Time Frame (Dressing Goal 1, OT) long term goal (LTG);by discharge  -TS      Strategies/Barriers (Dressing Goal 1, OT) sitting EOB  -TS      Progress/Outcome (Dressing Goal 1, OT) goal not met  -TS              Problem Specific Goal 1 (OT)    Problem Specific Goal 1 (OT) Pt. will sit EOB x 10 min at [facility] to increase act gina & fxl balance for ADLs  -TS      Time Frame (Problem Specific Goal 1, OT) long term goal (LTG);by discharge  -TS      Progress/Outcome (Problem Specific Goal 1, OT) goal not met  -TS            User Key  (r) = Recorded By, (t) = Taken By, (c) = Cosigned By    Initials Name Provider Type Discipline    TS Caron [place] COTA Occupational Therapist Assistant OT                            OT Discharge Summary  Anticipated Discharge Disposition (OT): home with 24/7 care  Reason for Discharge: Unable to  participate, other (comment)  Outcomes Achieved: Refer to plan of care for updates on goals achieved  Discharge Destination: other (comment)      ASCENCION Lares  4/4/2022

## 2022-04-04 NOTE — PROGRESS NOTES
"Adult Nutrition  Assessment/PES    Patient Name:  Namita Zabala  YOB: 1975  MRN: [id number]  Admit Date:  4/2/2022    Assessment Date:  4/4/2022     Reason for Assessment     Row Name 04/04/22 1524          Reason for Assessment    Reason For Assessment identified at risk by screening criteria;per organizational policy     Diagnosis gastrointestinal disease     Identified At Risk by Screening Criteria unintentional loss of 10 lbs or more in the past 2 mos  Per Nurse Admission Screen                Nutrition/Diet History     Row Name 04/04/22 1524          Nutrition/Diet History    Typical Intake (Food/Fluid/EN/PN) Pt tolerating Gatorade and jello. Pt denied eating anything solid prior to admission. Pt very [place]. Unable to report weight changes. Pt familiar to this RD d/t LOS on LTACH. Pt did c/o nausea frequently at that time too and consumed mostly SlimFast. Will continue to monitor POC.     Herbal/Other Supplements Hx of using SlimFast.     Factors Affecting Nutritional Intake altered gastrointestinal function;appetite;nausea                Anthropometrics     Row Name 04/04/22 1527          Anthropometrics    Height for Calculation 1.702 m (5' 7\")     Weight for Calculation 63.4 kg (139 lb 12.4 oz)                Labs/Tests/Procedures/Meds     Row Name 04/04/22 1526          Labs/Procedures/Meds    Lab Results Reviewed reviewed     Lab Results Comments PO4            Diagnostic Tests/Procedures    Diagnostic Test/Procedure Reviewed reviewed     Diagnostic Test/Procedures Comments Abd US tomorrow            Medications    Pertinent Medications Reviewed reviewed     Pertinent Medications Comments See MAR                Physical Findings     Row Name 04/04/22 1526          Physical Findings    Overall Physical Appearance weak, room air, nausea no vomiting. BM 4/2. Skin: wound to adbomen and Pritesh Score 13.                Estimated/Assessed Needs - Anthropometrics     Row Name 04/04/22 1527    " "      Anthropometrics    Height for Calculation 1.702 m (5' 7\")     Weight for Calculation 63.4 kg (139 lb 12.4 oz)            Estimated/Assessed Needs    Additional Documentation Fluid Requirements (Group);KCAL/KG (Group);Protein Requirements (Group)            KCAL/KG    KCAL/KG 40 Kcal/Kg (kcal)     40 Kcal/Kg (kcal) 2536            Protein Requirements    Weight Used For Protein Calculations 63.4 kg (139 lb 12.4 oz)     Est Protein Requirement Amount (gms/kg) 2.0 gm protein     Estimated Protein Requirements (gms/day) 126.8            Fluid Requirements    Fluid Requirements (mL/day) 1902     RDA Method (mL) 1902                Nutrition Prescription Ordered     Row Name 04/04/22 1527          Nutrition Prescription PO    Current PO Diet Clear Liquid;NPO                Evaluation of Received Nutrient/Fluid Intake     Row Name 04/04/22 1528          Nutrient/Fluid Evaluation    Number of Days Evaluated Other (comment)  admission to present total; CLD/NPO day 2            Fluid Intake Evaluation    Oral Fluid (mL) 350     Other Fluid (mL) 800                  Malnutrition Severity Assessment     Row Name 04/04/22 1528          Malnutrition Severity Assessment    Malnutrition Type Chronic Disease - Related Malnutrition            Insufficient Energy Intake     Insufficient Energy Intake Findings Severe     Insufficient Energy Intake  <75% of est. energy requirement for > or equal to 3 months            Unintentional Weight Loss     Unintentional Weight Loss Findings Severe     Unintentional Weight Loss  Weight loss greater than 20% in one year  Weight loss: 86 lb since last September            Criteria Met (Must meet criteria for severity in at least 2 of these categories: M Wasting, Fat Loss, Fluid, Secondary Signs, Wt. Status, Intake)    Patient meets criteria for  Severe Malnutrition                 Problem/Interventions:   Problem 1     Row Name 04/04/22 1529          Nutrition Diagnoses Problem 1    Problem 1 " Malnutrition     Etiology (related to) Factors Affecting Nutrition     Reported/Observed By Patient     Appetite Poor     Reported GI Symptoms N & V;Abdominal Pain;Abdominal Distention;GI Distress;Diarrhea     Signs/Symptoms (evidenced by) Report of Mnimal PO Intake;Unintended Weight Change;No EN Route Available     Unintended Weight Change Loss     Number of Pounds Lost 86     Weight loss time period 6 months                      Intervention Goal     Row Name 04/04/22 1529          Intervention Goal    General Reduce/improve symptoms;Meet nutritional needs for age/condition;Disease management/therapy     PO Initiate feeding  when medically appropriate     TF/PN Inititiate TF/PN  if appropriate with POC     Weight No significant weight loss                Nutrition Intervention     Row Name 04/04/22 1530          Nutrition Intervention    RD/Tech Action Follow Tx progress;Care plan reviewd;Recommend/ordered     Recommended/Ordered EN                Nutrition Prescription     Row Name 04/04/22 1530          Nutrition Prescription EN    Enteral Prescription Enteral begin/change;Enteral to supply     Enteral Route NG     Product Peptamin 1.5 darryl     TF Delivery Method Continuous     Continuous TF Goal Rate (mL/hr) 90 mL/hr     Continuous TF Starting Rate (mL/hr) 25 mL/hr     Continuous TF Goal Volume (mL) 1980 mL     Continuous TF Starting Volume (mL) 300 mL  x 12 hours     Water flush (mL)  120 mL     Water Flush Frequency Every 4 hours     New EN Prescription Ordered? No, recommended            EN to Supply    Kcal/Day 2970 Kcal/Day     Kcal/Kg 46.85 Kcal/Kg     Kcal/Kg Weight Method Actual weight     Protein (gm/day) 135 gm/day     Meet Estimated Kcal Need (%) 117 %     Meet Estimated Protein Need (%) 106 %     TF Free H2O (mL) 1529 mL     Total Free H2O (mL/day) 2249 mL/day     Fiber Per Day (gm/day) 0 gm/day                Education/Evaluation     Row Name 04/04/22 1532          Education    Education No  discharge needs identified at this time            Monitor/Evaluation    Monitor Per protocol                 Electronically signed by:  Morelia Warner RD  04/04/22 15:33 CDT

## 2022-04-04 NOTE — PAYOR COMM NOTE
"FROM: MARJORIE RITCHIE  PHONE: [phone]  FAX: [phone]    PENDING: [id number]    Namita Cooper MARCELO (46 y.o. Female)             Date of Birth   1975    Social Security Number       Address   [address] [place] [id number]    Home Phone   [phone]    MRN   [id number]       Pentecostalism   Oriental orthodox    Marital Status                               Admission Date   4/2/22    Admission Type   Emergency    Admitting Provider   Fermin Mcclure DO    Attending Provider   Fermin Mcclure DO    Department, Room/Bed   [facility] 3A, 346/1       Discharge Date       Discharge Disposition       Discharge Destination                               Attending Provider: Fermin Mcclure DO    Allergies: Coconut, Nuts, Penicillins, [place]    Isolation: None   Infection: COVID (History) (09/15/21)   Code Status: CPR   Advance Care Planning Activity    Ht: 170.2 cm (67\")   Wt: 63.4 kg (139 lb 11.2 oz)    Admission Cmt: None   Principal Problem: Intractable nausea and vomiting [R11.2]                 Active Insurance as of 4/2/2022     Primary Coverage     Payor Plan Insurance Group Employer/Plan Group    ANTHEM BLUE CROSS ANTHEM BLUE CROSS BLUE SHIELD PPO [id number]     Payor Plan Address Payor Plan Phone Number Payor Plan Fax Number Effective Dates    [address] [phone]  1/1/2022 - None Entered    [facility] [id number]       Subscriber Name Subscriber Birth Date Member ID       BRANDON COOPER GEMA 1975 [id number]           Secondary Coverage     Payor Plan Insurance Group Employer/Plan Group    MEDICARE MEDICARE A & B      Payor Plan Address Payor Plan Phone Number Payor Plan Fax Number Effective Dates    [address] [phone]  7/1/2019 - None Entered    [facility] [id number]       Subscriber Name Subscriber Birth Date Member ID       NAMITA COOPER MARCELO 1975 [id number]                 Emergency Contacts      (Rel.) Home Phone Work Phone Mobile Phone    " Grant Zabala (Spouse) -- -- [phone]    Noel Johnson (Father) [phone] -- [phone]    Marquita Johnson (Mother) -- -- [phone]               History & Physical      Frank Ricks MD at 04/03/22 0305                [facility] Medicine Services  HISTORY AND PHYSICAL    Date of Admission: 4/2/2022  Primary Care Physician: Dvaid Lara MD    Subjective     Chief Complaint: Nausea and vomiting    History of Present Illness  Patient is a 46-year-old white female past medical history of very complicated course recently.  She had COVID-19 in August of last year complicated by pulmonary emboli on anticoagulation and an abdominal hematoma that formed.  This caused obstruction of her ureters leading to renal failure requiring dialysis temporarily.  She also has nephrostomy tubes 1 which is already been removed.  She had been in a nursing home and LTAC up until about 3 weeks ago.  After she returned home she started having problems with vomiting every single time she eats food or drinks liquids.  She had been hospitalized in the local hospital where her potassium was found to be low was replaced but she says nothing was done about her intractable vomiting.  She presents here today with similar problems.  She states that her bowels have been moving normally she denies fevers or chills she denies abdominal pain she has lost about 50 pounds.  It appears on her medication list she is on several antiemetics and Reglan liquid.  She denies diarrhea she was evaluated today has evidence of urinary tract infection.  Her nephrostomy tube no longer produces urine.  CT of the abdomen and pelvis shows left-sided internal/external nephroureteral stent, hydronephrosis with urothelial thickening and bladder mucosal enhancement possibly reactive versus infectious.  No evidence of perinephric fluid collection no obstructing stones remainder of organs are unremarkable and no  abnormality of GI tract.  She does have evidence of UTI and her potassium today is 2.6.  Her LFTs show an elevated alk phos otherwise unremarkable.  He does still have her gallbladder.  She denies any hematemesis or coffee-ground emesis.  She has had a history of ulcers.        Review of Systems   14 point review of systems negative except for as per HPI    Otherwise complete ROS reviewed and negative except as mentioned in the HPI.    Past Medical History:   Past Medical History:   Diagnosis Date   • Angina at rest (HCC)    • Migraine     Sees Pain Management for injections.    • MVP (mitral valve prolapse)    • Renal disorder    • Syncope      Past Surgical History:  Past Surgical History:   Procedure Laterality Date   • BREAST BIOPSY Right 2011    benign   • INSERTION HEMODIALYSIS CATHETER Left 9/16/2021    Procedure: HEMODIALYSIS CATHETER INSERTION;  Surgeon: Anders Kaur MD;  Location: [place] [address];  Service: Vascular;  Laterality: Left;   • TONSILLECTOMY       Social History:  reports that she has never smoked. She has never used smokeless tobacco. She reports that she does not drink alcohol and does not use drugs.    Family History: family history includes Colon cancer (age of onset: 52) in her maternal aunt; Fibromyalgia in her mother; Heart disease in her mother; Hodgkin's lymphoma in her paternal grandmother; Hypertension in her mother.       Allergies:  Allergies   Allergen Reactions   • Coconut Unknown - High Severity   • Nuts Unknown - High Severity   • Penicillins    • [place] Other (See Comments)     Causes migraines per pt reports       Medications:  Prior to Admission medications    Medication Sig Start Date End Date Taking? Authorizing Provider   butalbital-acetaminophen-caffeine (FIORICET, ESGIC) -40 MG per tablet Take 2 tablets by mouth Every 4 (Four) Hours As Needed for Headache or Migraine.   Yes Provider, MD Odalys   eszopiclone (LUNESTA) 3 MG tablet Take 3 mg by  mouth Every Night. Take immediately before bedtime   Yes Odalys Mullen MD   FLUoxetine (PROzac) 20 MG capsule Take 40 mg by mouth Daily.   Yes Odalys Mullen MD   methocarbamol (ROBAXIN) 500 MG tablet Take 500 mg by mouth 2 (Two) Times a Day As Needed for Muscle Spasms.   Yes Odalys Mullen MD   metoclopramide (REGLAN) 5 MG/5ML solution Take 5 mg by mouth 3 (Three) Times a Day Before Meals.   Yes Odalys Mullen MD   ondansetron ODT (ZOFRAN-ODT) 4 MG disintegrating tablet Place 4 mg on the tongue 4 (Four) Times a Day As Needed for Nausea or Vomiting.   Yes Odalys Mullen MD   oxymetazoline (AFRIN) 0.05 % nasal spray 2 sprays into the nostril(s) as directed by provider 2 (Two) Times a Day.   Yes Odalys Mullen MD   pantoprazole (PROTONIX) 40 MG EC tablet Take 1 tablet by mouth Every Morning. 9/25/21  Yes Darrell De La Torre DO   prochlorperazine (COMPAZINE) 10 MG tablet Take 10 mg by mouth Every 8 (Eight) Hours As Needed for Nausea or Vomiting.   Yes Odalys Mullen MD   promethazine (PHENERGAN) 25 MG tablet Take 25 mg by mouth Every 6 (Six) Hours As Needed for Nausea or Vomiting.   Yes Odalys Mullen MD   rivaroxaban (XARELTO) 20 MG tablet Take 20 mg by mouth Every Night.   Yes Odalys Mullen MD   rizatriptan (MAXALT) 10 MG tablet Take 10 mg by mouth 1 (One) Time As Needed for Migraine. May repeat in 2 hours if needed   Yes Odalys Mullen MD   traZODone (DESYREL) 100 MG tablet Take 100 mg by mouth Every Night.   Yes Odalys Mullen MD   acetaminophen (TYLENOL) 325 MG tablet Take 2 tablets by mouth Every 6 (Six) Hours As Needed for Mild Pain , Fever or Headache (fever greater than 101.5 F or headache). 9/24/21   Darrell DeL a Torre DO   bisacodyl (DULCOLAX) 10 MG suppository Insert 1 suppository into the rectum Daily. 9/25/21   Darrell De La Torre DO   cloNIDine (CATAPRES-TTS) 0.1 MG/24HR patch Place 1 patch on the skin as directed by provider 1  (One) Time Per Week. 9/26/21   Darrell De La Torre, DO   dextrose (D50W) 50 % solution Infuse 50 mL into a venous catheter Every 15 (Fifteen) Minutes As Needed for Low Blood Sugar (Blood Sugar Less Than 70). 9/24/21   Darrell De La Torre, DO   dextrose (GLUTOSE) 40 % gel Take 15 g by mouth Every 15 (Fifteen) Minutes As Needed for Low Blood Sugar (Blood sugar less than 70). 9/24/21   Darrell De La Torre, DO   epoetin mindy-epbx (RETACRIT) 79433 UNIT/ML injection Inject 1 mL under the skin into the appropriate area as directed 3 (Three) Times a Week. Indications: ESRD on Dialysis 9/27/21   Darrell De La Torre, DO   glucagon, human recombinant, (GLUCAGEN DIAGNOSTIC) 1 MG injection Inject 1 mg under the skin into the appropriate area as directed Every 15 (Fifteen) Minutes As Needed (Blood Glucose Less Than 70) for up to 360 days. 9/24/21 9/19/22  Darrell De La Torre,    Heparin Sodium, Porcine, (heparin, porcine,) 1000 UNIT/ML injection 3.6 mL by Intracatheter route As Needed (after dialysis). Indications: Other - full anticoagulation 9/24/21   Darrell De La Torre DO   insulin regular (humuLIN R,novoLIN R) 100 UNIT/ML injection Inject 0-9 Units under the skin into the appropriate area as directed Every 6 (Six) Hours. 9/24/21   Darrell De La Torre DO   lidocaine (LIDODERM) 5 % Place 1 patch on the skin as directed by provider Daily. Remove & Discard patch within 12 hours or as directed by MD 9/25/21   Darrell De La Torre DO   lidocaine (LIDODERM) 5 % Place 1 patch on the skin as directed by provider Daily. Remove & Discard patch within 12 hours or as directed by MD 9/25/21   Darrell De La Torre, DO   metoprolol tartrate (LOPRESSOR) 25 MG tablet Take 1 tablet by mouth Every 12 (Twelve) Hours. 9/24/21   Darrell De La Torre, DO   ondansetron (ZOFRAN) 4 MG/2ML injection Infuse 2 mL into a venous catheter Every 6 (Six) Hours As Needed for Nausea or Vomiting. 9/24/21   Darrell De La Torre, DO   sennosides-docusate (PERICOLACE) 8.6-50 MG per tablet Take 1  "tablet by mouth 2 (Two) Times a Day. 9/24/21   Darrell De La Torre,      I have utilized all available immediate resources to obtain, update, and review the patient's current medications.    Objective     Vital Signs: /91 (BP Location: Right arm, Patient Position: Lying)   Pulse 107   Temp 97.8 °F (36.6 °C) (Oral)   Resp 16   Ht 170.2 cm (67\")   Wt 63.4 kg (139 lb 11.2 oz)   SpO2 100%   BMI 21.88 kg/m²   Physical Exam   Gen.:  Well-nourished well-developed white female in no acute distress  HEENT: Atraumatic, normocephalic.  Pupils equal, round, and reactive to light. Extraocular movements intact.  Sclerae anicteric.  External ears negative.  Mucous membranes moist.  Neck is supple without lymphadenopathy.  No JVD is noted.  No carotid bruits are auscultated.  Oropharynx is without erythema or exudate.   Chest: Clear to auscultation and percussion.  CV: Regular rate and rhythm.  Normal S1-S2.  No gallops, murmurs. or rubs.  Abdomen: Soft, periumbilical tenderness, nondistended.  Active bowel sounds,  No hepatosplenomegaly.  No masses.  No hernias.  Midline incision almost fully healed by secondary intent  Extremities: No clubbing, edema, or cyanosis.  Capillary refill is normal.  Pulses are 2+ and symmetric at radial and dorsalis pedis.  Posterior tibial pulses are intact and 2+ palpable.  Neuro: Patient is awake, alert, and oriented ×3.  Cranial nerves II through XII are grossly intact.  Motor is 5 out of 5 bilaterally.  DTRs are 2+ and symmetric bilaterally. Sensory exam is nonfocal  Skin: Warm, dry, and intact.  No evidence of breakdown.  Midline incision  Sensorium: Normal thought and affect    Nursing notes and vital signs reviewed        Results Reviewed:  Lab Results (last 24 hours)     Procedure Component Value Units Date/Time    Lipase [[id number]]  (Normal) Collected: 04/02/22 2113    Specimen: Blood Updated: 04/03/22 0423     Lipase 32 U/L     Hemoglobin A1c [[id number]] Collected: 04/02/22 2113 "    Specimen: Blood Updated: 04/03/22 0416    STAT Lactic Acid, Reflex [[id number]]  (Normal) Collected: 04/03/22 0152    Specimen: Blood Updated: 04/03/22 0229     Lactate 1.7 mmol/L     COVID-19 and FLU A/B PCR - Swab, Nasopharynx [[id number]]  (Normal) Collected: 04/02/22 2154    Specimen: Swab from Nasopharynx Updated: 04/02/22 2312     COVID19 Not Detected     Influenza A PCR Not Detected     Influenza B PCR Not Detected    Narrative:      Fact sheet for providers: https://www.fda.gov/media/296688/download    Fact sheet for patients: https://www.fda.gov/media/322785/download    Test performed by PCR.    Lactic Acid, Plasma [[id number]]  (Abnormal) Collected: 04/02/22 2235    Specimen: Blood Updated: 04/02/22 2306     Lactate 2.8 mmol/L     Urinalysis With Culture If Indicated - Urine, Catheter [[id number]]  (Abnormal) Collected: 04/02/22 2221    Specimen: Urine, Catheter Updated: 04/02/22 2258     Color, UA [place]     Appearance, UA Turbid     pH, UA 6.5     Specific Gravity, UA 1.016     Glucose, UA Negative     Ketones, UA Trace     Bilirubin, UA Small (1+)     Blood, UA Large (3+)     Protein, UA >=300 mg/dL (3+)     Leuk Esterase, UA Large (3+)     Nitrite, UA Negative     Urobilinogen, UA 1.0 E.U./dL    Narrative:      Dipstick results may be inaccurate due to color interference.    Urinalysis, Microscopic Only - Urine, Catheter [[id number]]  (Abnormal) Collected: 04/02/22 2221    Specimen: Urine, Catheter Updated: 04/02/22 2258     RBC, UA Too Numerous to Count /HPF      WBC, UA Too Numerous to Count /HPF      Bacteria, UA 4+ /HPF      Squamous Epithelial Cells, UA       Unable to determine due to loaded field     /HPF     Methodology Manual Light Microscopy    Urine Culture - Urine, Urine, Catheter [[id number]] Collected: 04/02/22 2221    Specimen: Urine, Catheter Updated: 04/02/22 2258    Blood Culture - Blood, Hand, Left [[id number]] Collected: 04/02/22 2235    Specimen: Blood from Hand, Left Updated: 04/02/22  2250    Blood Culture - Blood, Arm, Right [[id number]] Collected: 04/02/22 2235    Specimen: Blood from Arm, Right Updated: 04/02/22 2250    [place] Draw [[id number]] Collected: 04/02/22 2113    Specimen: Blood Updated: 04/02/22 2217    Narrative:      The following orders were created for panel order [place] Draw.  Procedure                               Abnormality         Status                     ---------                               -----------         ------                     Green Top (Gel)[[id number]]                                  Final result               Lavender Top[[id number]]                                     Final result               Red Top[[id number]]                                          Final result               Light Blue Top[[id number]]                                   Final result                 Please view results for these tests on the individual orders.    Red Top [[id number]] Collected: 04/02/22 2113    Specimen: Blood Updated: 04/02/22 2217     Extra Tube Hold for add-ons.     Comment: Auto resulted.       Light Blue Top [[id number]] Collected: 04/02/22 2113    Specimen: Blood Updated: 04/02/22 2217     Extra Tube hold for add-on     Comment: Auto resulted       Green Top (Gel) [[id number]] Collected: 04/02/22 2113    Specimen: Blood Updated: 04/02/22 2217     Extra Tube Hold for add-ons.     Comment: Auto resulted.       Lavender Top [[id number]] Collected: 04/02/22 2113    Specimen: Blood Updated: 04/02/22 2217     Extra Tube hold for add-on     Comment: Auto resulted       TSH [[id number]]  (Normal) Collected: 04/02/22 2113    Specimen: Blood Updated: 04/02/22 2150     TSH 2.410 uIU/mL     Procalcitonin [[id number]]  (Abnormal) Collected: 04/02/22 2113    Specimen: Blood Updated: 04/02/22 2150     Procalcitonin 0.31 ng/mL     Narrative:      As a Marker for Sepsis (Non-Neonates):    1. <0.5 ng/mL represents a low risk of severe sepsis and/or septic shock.  2. >2 ng/mL represents a  "high risk of severe sepsis and/or septic shock.    As a Marker for Lower Respiratory Tract Infections that require antibiotic therapy:    PCT on Admission    Antibiotic Therapy       6-12 Hrs later    >0.5                Strongly Recommended  >0.25 - <0.5        Recommended   0.1 - 0.25          Discouraged              Remeasure/reassess PCT  <0.1                Strongly Discouraged     Remeasure/reassess PCT    As 28 day mortality risk marker: \"Change in Procalcitonin Result\" (>80% or <=80%) if Day 0 (or Day 1) and Day 4 values are available. Refer to http://www.[facility]-pct-calculator.com    Change in PCT <=80%  A decrease of PCT levels below or equal to 80% defines a positive change in PCT test result representing a higher risk for 28-day all-cause mortality of patients diagnosed with severe sepsis for septic shock.    Change in PCT >80%  A decrease of PCT levels of more than 80% defines a negative change in PCT result representing a lower risk for 28-day all-cause mortality of patients diagnosed with severe sepsis or septic shock.       T4, Free [[id number]]  (Normal) Collected: 04/02/22 2113    Specimen: Blood Updated: 04/02/22 2149     Free T4 1.36 ng/dL     Narrative:      Results may be falsely increased if patient taking Biotin.      Comprehensive Metabolic Panel [[id number]]  (Abnormal) Collected: 04/02/22 2113    Specimen: Blood Updated: 04/02/22 2146     Glucose 97 mg/dL      BUN 13 mg/dL      Creatinine 1.11 mg/dL      Sodium 137 mmol/L      Potassium 2.6 mmol/L      Chloride 90 mmol/L      CO2 27.0 mmol/L      Calcium 10.6 mg/dL      Total Protein 8.0 g/dL      Albumin 3.60 g/dL      ALT (SGPT) 20 U/L      AST (SGOT) 28 U/L      Alkaline Phosphatase 149 U/L      Total Bilirubin 1.2 mg/dL      Globulin 4.4 gm/dL      A/G Ratio 0.8 g/dL      BUN/Creatinine Ratio 11.7     Anion Gap 20.0 mmol/L      eGFR 63.0 mL/min/1.73      Comment: National Kidney Foundation and American Society of Nephrology (ASN) Task " Force recommended calculation based on the Chronic Kidney Disease Epidemiology Collaboration (CKD-EPI) equation refit without adjustment for race.       Narrative:      GFR Normal >60  Chronic Kidney Disease <60  Kidney Failure <15      Magnesium [[id number]]  (Normal) Collected: 04/02/22 2113    Specimen: Blood Updated: 04/02/22 2138     Magnesium 1.7 mg/dL     CBC & Differential [[id number]]  (Abnormal) Collected: 04/02/22 2113    Specimen: Blood Updated: 04/02/22 2124    Narrative:      The following orders were created for panel order CBC & Differential.  Procedure                               Abnormality         Status                     ---------                               -----------         ------                     CBC Auto Differential[[id number]]        Abnormal            Final result                 Please view results for these tests on the individual orders.    CBC Auto Differential [[id number]]  (Abnormal) Collected: 04/02/22 2113    Specimen: Blood Updated: 04/02/22 2124     WBC 9.07 10*3/mm3      RBC 5.13 10*6/mm3      Hemoglobin 14.4 g/dL      Hematocrit 42.3 %      MCV 82.5 fL      MCH 28.1 pg      MCHC 34.0 g/dL      RDW 14.6 %      RDW-SD 44.0 fl      MPV 8.9 fL      Platelets 476 10*3/mm3      Neutrophil % 63.9 %      Lymphocyte % 26.9 %      Monocyte % 6.5 %      Eosinophil % 1.7 %      Basophil % 0.6 %      Immature Grans % 0.4 %      Neutrophils, Absolute 5.80 10*3/mm3      Lymphocytes, Absolute 2.44 10*3/mm3      Monocytes, Absolute 0.59 10*3/mm3      Eosinophils, Absolute 0.15 10*3/mm3      Basophils, Absolute 0.05 10*3/mm3      Immature Grans, Absolute 0.04 10*3/mm3      nRBC 0.0 /100 WBC         Imaging Results (Last 24 Hours)     Procedure Component Value Units Date/Time    CT Abdomen Pelvis With Contrast [[id number]] Resulted: 04/02/22 2212     Updated: 04/02/22 2218    XR Chest 1 View [[id number]] Collected: 04/02/22 2146     Updated: 04/02/22 2151    Narrative:      EXAM: XR CHEST 1  VW-     INDICATION: weakness     COMPARISON: 9/20/2021     FINDINGS:     Cardiac silhouette is normal in size. Chronic mild RIGHT hemidiaphragm  elevation with associated RIGHT basilar atelectasis. No pleural  effusion, pneumothorax, or focal consolidation. No acute osseous  finding.       Impression:         No acute findings. Mild chronic RIGHT hemidiaphragm elevation with  presumed RIGHT basilar atelectasis.   This report was finalized on 04/02/2022 21:48 by Dr. Gomez Cárdenas MD.        I have personally reviewed and interpreted the radiology studies and ECG obtained at time of admission.     Assessment / Plan     Assessment:   Active Hospital Problems    Diagnosis    • **Intractable nausea and vomiting    • Hypokalemia    • UTI (urinary tract infection), bacterial    • Malfunction of nephrostomy tube (HCC)    • Essential (primary) hypertension    1.  Intractable nausea and vomiting plans admit the patient will start on a clear liquid diet if we can advance it we will start her on scheduled IV Reglan and IV PPI.  Consult GI for further recommendations.  We will also check a gallbladder ultrasound.  2.  Hypokalemia replace potassium we will also give her a dose of magnesium sulfate since she is borderline low.  Recheck labs in a.m.  3.  UTI culture blood and urine start IV Rocephin this may be contributing to her nausea and vomiting.  4.  Malfunctioning nephrostomy tube she is inquiring about getting it removed she does have hydronephrosis on that side will ask urology for their opinion.  5.  Hypertension resume home medications monitor BP.  6.  Nasal congestion likely from vomiting we will give her Afrin and Nasonex.  7.  Medications reviewed and resumed as appropriate.    Patient seen after midnight       Code Status/Advanced Care Plan: Full    The patient's surrogate decision maker is the patient's .     I discussed my findings and recommendations with the patient.    Estimated length of stay is 2-3  nights.     The patient was seen and examined by me on April 3, 2022 at 3:05 AM.    Electronically signed by Frank Ricks MD, 04/03/22, 04:25 CDT.                Electronically signed by Frank Ricks MD at 04/03/22 0434          Emergency Department Notes      Abisai Becerra PA-C at 04/02/22 2107     Attestation signed by Tommy Linares MD at 04/03/22 0100        SUPERVISE: For this patient encounter, I reviewed the APC's documentation, treatment plan, and medical decision making.  Tommy Linares MD 4/3/2022 01:00 CDT                         Subjective   History of Present Illness    Patient is a 46-year-old female presenting to ED via EMS with weakness and vomiting.  PMH significant for MVP, history of angina, migraines. Medical history notable for COVID-19 infection in August with subsequent pulmonary emboli, anticoagulation, development of abdominal hematoma, obstruction of her ureters leading to kidney failure which required temporary dialysis that patient states she is no longer receiving. Patient states approximately 3 weeks ago she returned home after a long term staying in nursing facility secondary to complications from a ruptured bladder and nephrostomy tubes.  Patient states since returning home 3 weeks ago she has had problems with vomiting anytime she tries to take in food and now liquids as well.  Patient reports that she has not tried any oral intake today and has developed nausea but reports normally no nausea.  Patient describes increased pain along her abdominal incision over the past few days.  Patient did note she formally had 2 nephrostomy tubes and has since had resolution of her right-sided tube and has a partial left-sided nephrostomy.  Patient states that her  routinely changes her depends and she does not believe she has had any recent changes to her urine output or hematuria.  Patient denies any known fevers, chills, or diaphoresis.  Patient describes  feelings of generalized weakness and presents for further evaluation at this time.    Records reviewed show patient last seen in ED on 1/18/2022 for physical deconditioning, pelvic hematoma, muscle weakness.    Patient was seen via telehealth with PCP on 3/22/2022 for intractable nausea vomiting, insomnia, pulmonary emboli, muscle spasm, decreased appetite, depression, anxiety.      Review of Systems   Constitutional: Negative for fever.   HENT: Negative.    Eyes: Negative.    Respiratory: Negative.    Cardiovascular: Negative.    Gastrointestinal: Positive for abdominal pain and vomiting. Negative for constipation and diarrhea.   Genitourinary: Negative for decreased urine volume and hematuria.   Musculoskeletal: Negative.    Skin: Negative.    Neurological: Positive for weakness (generalized).   Psychiatric/Behavioral: Negative.    All other systems reviewed and are negative.      Past Medical History:   Diagnosis Date   • Angina at rest (HCC)    • Migraine     Sees Pain Management for injections.    • MVP (mitral valve prolapse)    • Renal disorder    • Syncope        Allergies   Allergen Reactions   • Coconut Unknown - High Severity   • Nuts Unknown - High Severity   • Penicillins        Past Surgical History:   Procedure Laterality Date   • BREAST BIOPSY Right 2011    benign   • INSERTION HEMODIALYSIS CATHETER Left 9/16/2021    Procedure: HEMODIALYSIS CATHETER INSERTION;  Surgeon: Anders Kaur MD;  Location: [place] [address];  Service: Vascular;  Laterality: Left;   • TONSILLECTOMY         Family History   Problem Relation Age of Onset   • Colon cancer Maternal Aunt 52   • Fibromyalgia Mother    • Heart disease Mother    • Hypertension Mother    • Hodgkin's lymphoma Paternal Grandmother    • Ovarian cancer Neg Hx    • Breast cancer Neg Hx        Social History     Socioeconomic History   • Marital status:    Tobacco Use   • Smoking status: Never Smoker   • Smokeless tobacco: Never Used    Substance and Sexual Activity   • Alcohol use: No   • Drug use: No           Objective   Physical Exam  Vitals and nursing note reviewed.   Constitutional:       General: She is not in acute distress.     Appearance: Normal appearance. She is well-developed and well-groomed. She is ill-appearing (chronically ill appearing).   HENT:      Head: Normocephalic.      Mouth/Throat:      Mouth: Mucous membranes are moist.      Pharynx: Oropharynx is clear.   Eyes:      Conjunctiva/sclera: Conjunctivae normal.      Pupils: Pupils are equal, round, and reactive to light.   Cardiovascular:      Rate and Rhythm: Tachycardia present.   Pulmonary:      Effort: Pulmonary effort is normal. No respiratory distress.      Breath sounds: Normal breath sounds.   Chest:      Chest wall: No tenderness.   Abdominal:      General: Bowel sounds are normal.      Palpations: Abdomen is soft.      Tenderness: There is abdominal tenderness (periumbilical). There is no right CVA tenderness.      Comments: Left-sided nephrostomy tube in place which is well-appearing with no drainage, no tenderness, no swelling overlying the area.    Healing vertical midline surgical incision with central portion still healing with no dehiscence    Musculoskeletal:      Cervical back: Normal range of motion and neck supple.      Right lower leg: No edema.      Left lower leg: No edema.   Skin:     General: Skin is warm and dry.   Neurological:      Mental Status: She is alert and oriented to person, place, and time.      Comments: Decreased strength symmetrically to bilateral LE which patient reports is at baseline   Psychiatric:         Mood and Affect: Mood normal.         Behavior: Behavior normal. Behavior is cooperative.         Procedures          ED Course                                                 MDM  Number of Diagnoses or Management Options     Amount and/or Complexity of Data Reviewed  Clinical lab tests: reviewed and ordered  Tests in the  radiology section of CPT®: reviewed and ordered  Tests in the medicine section of CPT®: ordered and reviewed  Decide to obtain previous medical records or to obtain history from someone other than the patient: yes  Review and summarize past medical records: yes  Discuss the patient with other providers: yes (Dr. Tommy Linares (attending)  Dr. Ricks (hospitalist)  )    Patient Progress  Patient progress: stable    Patient is a 46-year-old female presenting to ED via EMS with weakness and vomiting.  PMH significant for MVP, history of angina, migraines. Medical history notable for COVID-19 infection in August with subsequent pulmonary emboli, anticoagulation, development of abdominal hematoma, obstruction of her ureters leading to kidney failure which required temporary dialysis that patient states she is no longer receiving.  CBC with thrombocytosis 476 and otherwise unremarkable including normal white blood cell count 9.07.  CMP with hypokalemia 2.6, creatinine 1.11, normal GFR at 63, anion gap of 20, alk phos 149.  Magnesium WNL.  Procalcitonin elevated at 0.31.  Lactic acid elevated at 2.8. Thyroid hormones WNL.  Urinalysis showed large leukocytes, TNTC WBC, 4+ bacteria, TNTC RBC, negative nitrites. CT of the abdomen and pelvis showed: Left-sided internal/external nephroureteral stent, hydronephrosis with ureteral style thickening and bladder mucosal enhancement which may be reactive versus infectious, no perinephric fluid collection, no obstructing stones, hepatic steatosis, remainder of organs unremarkable, no acute abnormality along the GI tract.  Discussed need for admission for further evaluation and treatment patient is amenable at this time.  Case was discussed with Dr. Ricks, hospitalist, who will contact the patient for admission under his services with no further recommendations or request.  Case discussed with Dr. Tommy Linares who is in agreement no further recommendations.    Final  diagnoses:   Hypokalemia   Urinary tract infection with hematuria, site unspecified   Nausea and vomiting, unspecified vomiting type   Lactic acidosis       ED Disposition  ED Disposition     ED Disposition   Decision to Admit    Condition   --    Comment   Level of Care: Med/Surg [1]   Diagnosis: Hypokalemia [934817]   Admitting Physician: CORNELIUS JEAN [[id number]]               No follow-up provider specified.       Medication List      No changes were made to your prescriptions during this visit.          Abisai Becerra PA-C  04/03/22 0052      Electronically signed by Tommy Linares MD at 04/03/22 0100       Vital Signs (last 2 days)     Date/Time Temp Temp src Pulse Resp BP Patient Position SpO2    04/04/22 1129 98 (36.7) Oral 102 18 114/73 Lying 98    04/04/22 0758 97.5 (36.4) Oral 101 16 108/69 Lying 98    04/04/22 0500 98.2 (36.8) Oral 101 16 104/72 Lying --     SpO2: 97 at 04/04/22 0500    04/04/22 0029 97.6 (36.4) Axillary 94 18 117/68 Lying 98    04/03/22 1947 98 (36.7) Oral 102 16 123/84 Lying 100    04/03/22 1545 98.4 (36.9) Oral 102 16 115/74 Lying 99    04/03/22 1103 97.8 (36.6) Oral 103 18 119/85 Lying 100    04/03/22 0801 98.4 (36.9) Oral 104 18 117/71 Lying 100    04/03/22 0300 98.3 (36.8) Oral 105 18 123/80 Lying --     SpO2: 99 at 04/03/22 0300    04/03/22 0201 97.8 (36.6) Oral 107 16 128/91 Lying 100    04/03/22 0031 -- -- 106 -- 123/97 -- 100    04/03/22 0016 -- -- 104 -- 130/109 -- 100    04/03/22 0001 -- -- 107 16 131/100 -- 100    04/02/22 2346 -- -- 106 -- 133/105 -- 100    04/02/22 2331 -- -- 107 -- 128/103 -- 100    04/02/22 2316 -- -- 108 -- 123/98 -- 100    04/02/22 2310 -- -- 106 -- 125/99 -- 100    04/02/22 2301 -- -- 107 -- 118/94 -- 98    04/02/22 2246 -- -- 108 -- 115/98 -- 100    04/02/22 2051 -- -- -- -- 122/96 -- --    04/02/22 2048 97.8 (36.6) Oral 116 28 -- -- 100          Facility-Administered Medications as of 4/4/2022   Medication Dose Route Frequency Provider Last  Rate Last Admin   • butalbital-acetaminophen-caffeine (FIORICET, ESGIC) -40 MG per tablet 2 tablet  2 tablet Oral Q4H PRN Frank Ricks MD       • [COMPLETED] cefTRIAXone (ROCEPHIN) in SWFI 1 gram/10ml IV PUSH syringe  1 g Intravenous Once Abisai Becerra PA-C   1 g at 04/03/22 0014   • droperidol (INAPSINE) injection 1.25 mg  1.25 mg Intravenous Q6H PRN Frank Ricks MD       • ertapenem (INVanz) 1 g in sodium chloride 0.9 % 100 mL IVPB-VTB  1 g Intravenous Q24H Fermin Mcclure DO   Stopped at 04/03/22 2020   • FLUoxetine (PROzac) capsule 40 mg  40 mg Oral Daily Frank Ricks MD   40 mg at 04/04/22 0921   • fluticasone (FLONASE) 50 MCG/ACT nasal spray 2 spray  2 spray Each Nare BID Frank Ricks MD   2 spray at 04/04/22 0921   • [COMPLETED] iopamidol (ISOVUE-300) 61 % injection 100 mL  100 mL Intravenous Once in imaging Abisai Becerra PA-C   100 mL at 04/02/22 2215   • [COMPLETED] lactated ringers bolus 500 mL  500 mL Intravenous Once Abisai Becerra PA-C   Stopped at 04/03/22 0010   • [COMPLETED] magnesium sulfate 2g/50 mL (PREMIX) infusion  2 g Intravenous Once Frank Ricks MD   2 g at 04/03/22 0523   • methocarbamol (ROBAXIN) tablet 500 mg  500 mg Oral BID PRN Frank Ricks MD       • metoclopramide (REGLAN) injection 5 mg  5 mg Intravenous Q6H Fermin Mcclure DO       • neomycin-polymyxin-hydrocortisone (CORTISPORIN) otic suspension 4 drop  4 drop Both Ears Q6H Fermin Mcclure DO   4 drop at 04/04/22 1142   • [COMPLETED] ondansetron (ZOFRAN) injection 4 mg  4 mg Intravenous Once Abisai Becerra PA-C   4 mg at 04/02/22 2244   • ondansetron (ZOFRAN) tablet 4 mg  4 mg Oral Q6H PRN Frank Ricks MD        Or   • ondansetron (ZOFRAN) injection 4 mg  4 mg Intravenous Q6H PRN Frank Ricks MD       • oxymetazoline (AFRIN) nasal spray 2 spray  2 spray Nasal BID Frank Ricks MD   2 spray at 04/04/22 0921   • pantoprazole (PROTONIX) injection 40 mg  40 mg  Intravenous Q12H Frank Ricks MD   40 mg at 22 0921   • [COMPLETED] potassium chloride 10 mEq in 100 mL IVPB  10 mEq Intravenous Once Abisai Becerra PA-C   Stopped at 22 0028   • [COMPLETED] potassium chloride 10 mEq in 100 mL IVPB  10 mEq Intravenous Once Abisai Becerra PA-C   Stopped at 22 0200   • [] potassium chloride 10 mEq in 100 mL IVPB  10 mEq Intravenous Q1H Frank Ricks  mL/hr at 22 1219 10 mEq at 22 1219   • [COMPLETED] potassium chloride 10 mEq in 100 mL IVPB  10 mEq Intravenous Q2H Fermin Mcclure DO   Stopped at 22   • prochlorperazine (COMPAZINE) tablet 10 mg  10 mg Oral Q8H PRN Frank Ricks MD       • promethazine (PHENERGAN) tablet 25 mg  25 mg Oral Q6H PRN Frank Ricks MD       • rivaroxaban (XARELTO) tablet 20 mg  20 mg Oral Nightly Frank Ricks MD   20 mg at 22   • sodium chloride 0.9 % flush 10 mL  10 mL Intravenous PRN Frank Ricks MD       • sodium chloride 0.9 % flush 10 mL  10 mL Intravenous PRN Frank Ricks MD       • sodium chloride 0.9 % flush 10 mL  10 mL Intravenous PRN Frank Ricks MD       • sodium chloride 0.9 % flush 10 mL  10 mL Intravenous Q12H Frank Ricks MD   10 mL at 22 0921   • sodium chloride 0.9 % flush 10 mL  10 mL Intravenous PRN Frank Ricks MD       • sodium chloride 0.9 % with KCl 20 mEq/L infusion  100 mL/hr Intravenous Continuous Frank Ricks  mL/hr at 22 0647 100 mL/hr at 22 0647   • SUMAtriptan (IMITREX) tablet 50 mg  50 mg Oral Once Frank Ricks MD       • traZODone (DESYREL) tablet 100 mg  100 mg Oral Nightly Frank Ricks MD   100 mg at 22   • zolpidem (AMBIEN) tablet 5 mg  5 mg Oral Nightly PRN Frank Ricks MD         Lab Results (last 48 hours)     Procedure Component Value Units Date/Time    Urine Culture - Urine, Urine, Catheter [[id number]]  (Abnormal) Collected: 22  2221    Specimen: Urine, Catheter Updated: 04/04/22 0915     Urine Culture Scant growth (1+) Gram Negative Bacilli    Blood Culture - Blood, Hand, Left [[id number]]  (Normal) Collected: 04/02/22 2235    Specimen: Blood from Hand, Left Updated: 04/03/22 2302     Blood Culture No growth at 24 hours    Blood Culture ID, PCR - Blood, Arm, Right [[id number]]  (Abnormal) Collected: 04/02/22 2235    Specimen: Blood from Arm, Right Updated: 04/03/22 1959     BCID, PCR Staph spp, not aureus or lugdunesis. Identification by BCID2 PCR.     BOTTLE TYPE Aerobic Bottle    Potassium [[id number]]  (Normal) Collected: 04/03/22 1824    Specimen: Blood Updated: 04/03/22 1924     Potassium 3.5 mmol/L     Blood Culture - Blood, Arm, Right [[id number]]  (Abnormal) Collected: 04/02/22 2235    Specimen: Blood from Arm, Right Updated: 04/03/22 1909     Blood Culture Abnormal Stain     Gram Stain Aerobic Bottle Gram positive cocci      Anaerobic Bottle Gram positive cocci    Comprehensive Metabolic Panel [[id number]]  (Abnormal) Collected: 04/03/22 0713    Specimen: Blood Updated: 04/03/22 0821     Glucose 100 mg/dL      BUN 13 mg/dL      Creatinine 0.95 mg/dL      Sodium 137 mmol/L      Potassium 2.5 mmol/L      Chloride 97 mmol/L      CO2 25.0 mmol/L      Calcium 8.7 mg/dL      Total Protein 5.3 g/dL      Albumin 2.60 g/dL      ALT (SGPT) 13 U/L      AST (SGOT) 18 U/L      Alkaline Phosphatase 100 U/L      Total Bilirubin 0.6 mg/dL      Globulin 2.7 gm/dL      A/G Ratio 1.0 g/dL      BUN/Creatinine Ratio 13.7     Anion Gap 15.0 mmol/L      eGFR 75.9 mL/min/1.73      Comment: National Kidney Foundation and American Society of Nephrology (ASN) Task Force recommended calculation based on the Chronic Kidney Disease Epidemiology Collaboration (CKD-EPI) equation refit without adjustment for race.       Narrative:      GFR Normal >60  Chronic Kidney Disease <60  Kidney Failure <15      CBC Auto Differential [[id number]]  (Abnormal) Collected: 04/03/22  0713    Specimen: Blood Updated: 04/03/22 0759     WBC 10.11 10*3/mm3      RBC 4.02 10*6/mm3      Hemoglobin 11.4 g/dL      Hematocrit 32.8 %      MCV 81.6 fL      MCH 28.4 pg      MCHC 34.8 g/dL      RDW 14.6 %      RDW-SD 43.0 fl      MPV 9.5 fL      Platelets 325 10*3/mm3      Neutrophil % 72.0 %      Lymphocyte % 15.6 %      Monocyte % 9.5 %      Eosinophil % 1.8 %      Basophil % 0.6 %      Immature Grans % 0.5 %      Neutrophils, Absolute 7.28 10*3/mm3      Lymphocytes, Absolute 1.58 10*3/mm3      Monocytes, Absolute 0.96 10*3/mm3      Eosinophils, Absolute 0.18 10*3/mm3      Basophils, Absolute 0.06 10*3/mm3      Immature Grans, Absolute 0.05 10*3/mm3      nRBC 0.0 /100 WBC     Hemoglobin A1c [[id number]]  (Abnormal) Collected: 04/02/22 2113    Specimen: Blood Updated: 04/03/22 0438     Hemoglobin A1C 4.60 %     Narrative:      Hemoglobin A1C Ranges:    Increased Risk for Diabetes  5.7% to 6.4%  Diabetes                     >= 6.5%  Diabetic Goal                < 7.0%    Lipase [[id number]]  (Normal) Collected: 04/02/22 2113    Specimen: Blood Updated: 04/03/22 0423     Lipase 32 U/L     STAT Lactic Acid, Reflex [[id number]]  (Normal) Collected: 04/03/22 0152    Specimen: Blood Updated: 04/03/22 0229     Lactate 1.7 mmol/L     COVID-19 and FLU A/B PCR - Swab, Nasopharynx [[id number]]  (Normal) Collected: 04/02/22 2154    Specimen: Swab from Nasopharynx Updated: 04/02/22 2312     COVID19 Not Detected     Influenza A PCR Not Detected     Influenza B PCR Not Detected    Narrative:      Fact sheet for providers: https://www.fda.gov/media/559026/download    Fact sheet for patients: https://www.fda.gov/media/772142/download    Test performed by PCR.    Lactic Acid, Plasma [[id number]]  (Abnormal) Collected: 04/02/22 2235    Specimen: Blood Updated: 04/02/22 2306     Lactate 2.8 mmol/L     Urinalysis With Culture If Indicated - Urine, Catheter [[id number]]  (Abnormal) Collected: 04/02/22 2221    Specimen: Urine, Catheter  Updated: 04/02/22 2258     Color, UA [place]     Appearance, UA Turbid     pH, UA 6.5     Specific Gravity, UA 1.016     Glucose, UA Negative     Ketones, UA Trace     Bilirubin, UA Small (1+)     Blood, UA Large (3+)     Protein, UA >=300 mg/dL (3+)     Leuk Esterase, UA Large (3+)     Nitrite, UA Negative     Urobilinogen, UA 1.0 E.U./dL    Narrative:      Dipstick results may be inaccurate due to color interference.    Urinalysis, Microscopic Only - Urine, Catheter [[id number]]  (Abnormal) Collected: 04/02/22 2221    Specimen: Urine, Catheter Updated: 04/02/22 2258     RBC, UA Too Numerous to Count /HPF      WBC, UA Too Numerous to Count /HPF      Bacteria, UA 4+ /HPF      Squamous Epithelial Cells, UA       Unable to determine due to loaded field     /HPF     Methodology Manual Light Microscopy    Echo Draw [[id number]] Collected: 04/02/22 2113    Specimen: Blood Updated: 04/02/22 2217    Narrative:      The following orders were created for panel order Echo Draw.  Procedure                               Abnormality         Status                     ---------                               -----------         ------                     Green Top (Gel)[[id number]]                                  Final result               Lavender Top[[id number]]                                     Final result               Red Top[[id number]]                                          Final result               Light Blue Top[[id number]]                                   Final result                 Please view results for these tests on the individual orders.    Red Top [[id number]] Collected: 04/02/22 2113    Specimen: Blood Updated: 04/02/22 2217     Extra Tube Hold for add-ons.     Comment: Auto resulted.       Light Blue Top [[id number]] Collected: 04/02/22 2113    Specimen: Blood Updated: 04/02/22 2217     Extra Tube hold for add-on     Comment: Auto resulted       Green Top (Gel) [[id number]] Collected: 04/02/22 2113     "Specimen: Blood Updated: 04/02/22 2217     Extra Tube Hold for add-ons.     Comment: Auto resulted.       Lavender Top [[id number]] Collected: 04/02/22 2113    Specimen: Blood Updated: 04/02/22 2217     Extra Tube hold for add-on     Comment: Auto resulted       TSH [[id number]]  (Normal) Collected: 04/02/22 2113    Specimen: Blood Updated: 04/02/22 2150     TSH 2.410 uIU/mL     Procalcitonin [[id number]]  (Abnormal) Collected: 04/02/22 2113    Specimen: Blood Updated: 04/02/22 2150     Procalcitonin 0.31 ng/mL     Narrative:      As a Marker for Sepsis (Non-Neonates):    1. <0.5 ng/mL represents a low risk of severe sepsis and/or septic shock.  2. >2 ng/mL represents a high risk of severe sepsis and/or septic shock.    As a Marker for Lower Respiratory Tract Infections that require antibiotic therapy:    PCT on Admission    Antibiotic Therapy       6-12 Hrs later    >0.5                Strongly Recommended  >0.25 - <0.5        Recommended   0.1 - 0.25          Discouraged              Remeasure/reassess PCT  <0.1                Strongly Discouraged     Remeasure/reassess PCT    As 28 day mortality risk marker: \"Change in Procalcitonin Result\" (>80% or <=80%) if Day 0 (or Day 1) and Day 4 values are available. Refer to http://www.[employer]-pct-calculator.com    Change in PCT <=80%  A decrease of PCT levels below or equal to 80% defines a positive change in PCT test result representing a higher risk for 28-day all-cause mortality of patients diagnosed with severe sepsis for septic shock.    Change in PCT >80%  A decrease of PCT levels of more than 80% defines a negative change in PCT result representing a lower risk for 28-day all-cause mortality of patients diagnosed with severe sepsis or septic shock.       T4, Free [[id number]]  (Normal) Collected: 04/02/22 2113    Specimen: Blood Updated: 04/02/22 2149     Free T4 1.36 ng/dL     Narrative:      Results may be falsely increased if patient taking Biotin.      " Comprehensive Metabolic Panel [[id number]]  (Abnormal) Collected: 04/02/22 2113    Specimen: Blood Updated: 04/02/22 2146     Glucose 97 mg/dL      BUN 13 mg/dL      Creatinine 1.11 mg/dL      Sodium 137 mmol/L      Potassium 2.6 mmol/L      Chloride 90 mmol/L      CO2 27.0 mmol/L      Calcium 10.6 mg/dL      Total Protein 8.0 g/dL      Albumin 3.60 g/dL      ALT (SGPT) 20 U/L      AST (SGOT) 28 U/L      Alkaline Phosphatase 149 U/L      Total Bilirubin 1.2 mg/dL      Globulin 4.4 gm/dL      A/G Ratio 0.8 g/dL      BUN/Creatinine Ratio 11.7     Anion Gap 20.0 mmol/L      eGFR 63.0 mL/min/1.73      Comment: National Kidney Foundation and American Society of Nephrology (ASN) Task Force recommended calculation based on the Chronic Kidney Disease Epidemiology Collaboration (CKD-EPI) equation refit without adjustment for race.       Narrative:      GFR Normal >60  Chronic Kidney Disease <60  Kidney Failure <15      Magnesium [[id number]]  (Normal) Collected: 04/02/22 2113    Specimen: Blood Updated: 04/02/22 2138     Magnesium 1.7 mg/dL     CBC & Differential [[id number]]  (Abnormal) Collected: 04/02/22 2113    Specimen: Blood Updated: 04/02/22 2124    Narrative:      The following orders were created for panel order CBC & Differential.  Procedure                               Abnormality         Status                     ---------                               -----------         ------                     CBC Auto Differential[[id number]]        Abnormal            Final result                 Please view results for these tests on the individual orders.    CBC Auto Differential [[id number]]  (Abnormal) Collected: 04/02/22 2113    Specimen: Blood Updated: 04/02/22 2124     WBC 9.07 10*3/mm3      RBC 5.13 10*6/mm3      Hemoglobin 14.4 g/dL      Hematocrit 42.3 %      MCV 82.5 fL      MCH 28.1 pg      MCHC 34.0 g/dL      RDW 14.6 %      RDW-SD 44.0 fl      MPV 8.9 fL      Platelets 476 10*3/mm3      Neutrophil % 63.9 %       Lymphocyte % 26.9 %      Monocyte % 6.5 %      Eosinophil % 1.7 %      Basophil % 0.6 %      Immature Grans % 0.4 %      Neutrophils, Absolute 5.80 10*3/mm3      Lymphocytes, Absolute 2.44 10*3/mm3      Monocytes, Absolute 0.59 10*3/mm3      Eosinophils, Absolute 0.15 10*3/mm3      Basophils, Absolute 0.05 10*3/mm3      Immature Grans, Absolute 0.04 10*3/mm3      nRBC 0.0 /100 WBC           Imaging Results (Last 48 Hours)     Procedure Component Value Units Date/Time    CT Abdomen Pelvis With Contrast [[id number]] Collected: 04/03/22 0743     Updated: 04/03/22 0751    Narrative:      EXAMINATION:   CT ABDOMEN PELVIS W CONTRAST-  4/3/2022 7:43 AM CDT     HISTORY: CT ABDOMEN AND PELVIS WITH CONTRAST 4/2/2022 10:09 PM CDT     HISTORY: Vomiting     COMPARISON: October 15, 2021.      DLP: 249 mGy cm Automated exposure control was also utilized to decrease  patient radiation dose.     TECHNIQUE: Following the intravenous administration of contrast, helical  CT tomographic images of the abdomen and pelvis were acquired. Coronal  reformatted images were also provided for review.      FINDINGS:   The lung bases and base of the heart are unremarkable.      LIVER: Decreased CT attenuation liver is noted consistent with hepatic  steatosis..      BILIARY SYSTEM: The gallbladder is unremarkable. No intrahepatic or  extrahepatic ductal dilatation.      PANCREAS: No focal pancreatic lesion.      SPLEEN: Unremarkable.      KIDNEYS AND ADRENALS: Adrenal glands are visualized. The right renal  contour is unremarkable.     The left renal contour is visualized. A percutaneous left nephrostomy  catheter is present. A double pigtail left ureteral catheters present  satisfactorily position.. There may be mild enhancement of the left  renal pelvis. This may be reactive or possibly infectious. There is no  perinephric stranding. The right ureters unremarkable.     RETROPERITONEUM: No mass, lymphadenopathy or hemorrhage.      GI TRACT: No  evidence of obstruction or bowel wall thickening. The  appendix is visualized and unremarkable.     OTHER: There is no mesenteric mass, lymphadenopathy or fluid collection.  The abdominopelvic vasculature is patent. The osseous structures and  soft tissues demonstrate no worrisome lesions.     PELVIS: No mass lesion, fluid collection or significant lymphadenopathy  is seen in the pelvis. The urinary bladder wall enhances. This may be  reactive. This may be inflammation..       Impression:      1. Left-sided external nephrostomy stent is present. Left internal  double pigtail ureteral stent catheters present. Mild enhancement of the  wall of the left renal pelvis and bladder is noted possibly an  infectious etiology.  2. Hepatic steatosis.     These images initially reviewed by stat rad at 2242 hours.         This report was finalized on 04/03/2022 07:48 by Dr. Nadeem Marshall MD.    XR Chest 1 View [[id number]] Collected: 04/02/22 2146     Updated: 04/02/22 2151    Narrative:      EXAM: XR CHEST 1 VW-     INDICATION: weakness     COMPARISON: 9/20/2021     FINDINGS:     Cardiac silhouette is normal in size. Chronic mild RIGHT hemidiaphragm  elevation with associated RIGHT basilar atelectasis. No pleural  effusion, pneumothorax, or focal consolidation. No acute osseous  finding.       Impression:         No acute findings. Mild chronic RIGHT hemidiaphragm elevation with  presumed RIGHT basilar atelectasis.   This report was finalized on 04/02/2022 21:48 by Dr. Gomez Cárdenas MD.        ECG/EMG Results (last 48 hours)     Procedure Component Value Units Date/Time    SCANNED - TELEMETRY   [[id number]] Resulted: 04/02/22     Updated: 04/03/22 0515          Orders (last 48 hrs)      Start     Ordered    04/04/22 1700  metoclopramide (REGLAN) injection 5 mg  Every 6 Hours         04/04/22 1208    04/04/22 1247  Inpatient Admission  Once         04/04/22 1246    04/04/22 1206  Inpatient Urology Consult  Once        Specialty:   Urology  Provider:  (Not yet assigned)    22 1206    22 1205  US Abdomen Complete  1 Time Imaging         22 1204    22 0600  CBC (No Diff)  Morning Draw         22 1317    22 0600  Basic Metabolic Panel  Morning Draw         22 1317    22 0600  Magnesium  Morning Draw         22 1317    22 0600  Phosphorus  Morning Draw         22 1317    22 0000  cefTRIAXone (ROCEPHIN) in SWFI 1 gram/10ml IV PUSH syringe  Every 24 Hours,   Status:  Discontinued         22 0411    22 2100  rivaroxaban (XARELTO) tablet 20 mg  Nightly         22 0411    22 2100  traZODone (DESYREL) tablet 100 mg  Nightly         22 0411    22 1830  Blood Culture ID, PCR - Blood, Arm, Right  Once         22 1829    22 1715  Potassium  Once         22 1608    22 1530  ertapenem (INVanz) 1 g in sodium chloride 0.9 % 100 mL IVPB-VTB  Every 24 Hours         22 1444    22 1445  neomycin-polymyxin-hydrocortisone (CORTISPORIN) otic suspension 4 drop  Every 6 Hours Scheduled         22 1356    22 1415  potassium chloride 10 mEq in 100 mL IVPB  Every 2 Hours         22 1317    22 1011  OT Plan of Care Cert / Re-Cert  Once        Comments: Occupational Therapy Plan of Care  Initial Certification  Certification Period: 4/3/2022 - 2022    Patient Name: Namita Zabala  : 1975    (E87.6) Hypokalemia  (primary encounter diagnosis)  (N39.0,  R31.9) Urinary tract infection with hematuria, site unspecified  (R11.2) Nausea and vomiting, unspecified vomiting type  (E87.2) Lactic acidosis                Assessment  OT Assessment  Rehab Potential (OT): fair, will monitor progress closely  Criteria for Skilled Therapeutic Inte terventions Met (OT): yes, skilled treatment is necessary         OT Rehab Goals     Row Name 22 0910             Transfer Goal 1 (OT)    Activity/Assistive  Device (Transfer Goal 1, OT) bed-to-chair/chair-to-bed    -CH      [place] Level/Cues Needed (Transfer Goal 1, OT) maximum assist   (25-49% patient effort)  x2  -CH      Time Frame (Transfer Goal 1, OT) long term goal (LTG);by discharge  -CH        Progress/Outcome (Transfer Goal 1, OT) goal ongoing  -CH              Dressing Goal  l 1 (OT)      Activity/Device (Dressing Goal 1, OT) upper body dressing  -CH      [place]/Cues Needed (Dressing Goal 1, OT) minimum assist (75% or   more patient effort)  -CH      Time Frame (Dressing Goal 1, OT) long term goal (LTG);by discharge  -CH        Strategies/Barriers (Dressing Goal 1, OT) sitting EOB  -CH              Problem Specific Goal 1 (OT)      Problem Specific Goal 1 (OT) Pt. will sit EOB x 10 min at [facility] to increase   act gina & fxl balance for ADLs  -CH      Time Frame (Pr Problem Specific Goal 1, OT) long term goal (LTG);by   discharge  -CH      Progress/Outcome (Problem Specific Goal 1, OT) goal ongoing  -CH            User Key  (r) = Recorded By, (t) = Taken By, (c) = Cosigned By    Initials Name Provider Type Discipline    Bobbi Amaya, OTR/L Occupational Therapist OT               OT Goals     Row Name 04/03/22 0910          Time Calculation    OT Goal Re-Cert Due Date 04/13/22  -CH           User Key  (r) = Recorded By, (t) = Taken By, (c) = Cosigned By  y    Initials Name Provider Type    Bobbi Amaya, OTR/L Occupational Therapist                Plan    OT Plan  Therapy Frequency (OT): 5 times/wk  Predicted Duration of Therapy Intervention (OT): until DC  Outcome Evaluation: OT eval completed.  Pt. ix AxO & c/o fear of nausea/vomitting.  Ms. Zabala reported full & complicated medical hx including multiple rehab placements.  At this time the pt was only able to roll in bed d/t her fear of n/v.  Encouraged repositioning of HOB to fowlers vs vs. flat & change of position to reduce negative ramifications of immobility.  Ms. Zabala requires Max  A/Dep A x 1 for LBD self care & toileting.  She is able to perform UB ADLs but would benefit ffrom cont'd OT tx to increase her activity level & improve her fxn.      Bobbi Nicole, OTR/L  4/3/2022        By cosigning this order, either electronically or physically, I certify that the therapy services are furnished while this patient is under my care, the services outline above are required b  by this patient, and will be reviewed every 90 days.        M.D.:__________________________________________ Date: ______________    04/03/22 1010    04/03/22 0900  FLUoxetine (PROzac) capsule 40 mg  Daily         04/03/22 0411    04/03/22 0900  oxymetazoline (AFRIN) nasal spray 2 spray  2 Times Daily,   Status:  Discontinued         04/03/22 0411 04/03/22 0900  sodium chloride 0.9 % flush 10 mL  Every 12 Hours Scheduled         04/03/22 0411    04/03/22 0900  fluticasone (FLONASE) 50 MCG/ACT nasal spray 2 spray  2 Times Daily         04/03/22 0435    04/03/22 0800  Vital Signs  Every 4 Hours       04/03/22 0411    04/03/22 0702  Inpatient Gastroenterology Consult  IN         Specialty:  Gastroenterology  Provider:  Jose Babin MD    04/03/22 0411    04/03/22 0600  Incentive Spirometry  Every 4 Hours While Awake       04/03/22 0411    04/03/22 0600  Comprehensive Metabolic Panel  Morning Draw         04/03/22 0411    04/03/22 0600  CBC Auto Differential  Morning Draw         04/03/22 0411    04/03/22 0531  Manual Differential  Once,   Status:  Canceled         04/03/22 0530    04/03/22 0530  pantoprazole (PROTONIX) injection 40 mg  Every 12 Hours Scheduled         04/03/22 0435    04/03/22 0530  oxymetazoline (AFRIN) nasal spray 2 spray  2 Times Daily         04/03/22 0435    04/03/22 0500  SUMAtriptan (IMITREX) tablet 50 mg  Once         04/03/22 0411    04/03/22 0500  sodium chloride 0.9 % with KCl 20 mEq/L infusion  Continuous         04/03/22 0411    04/03/22 0500  potassium chloride 10 mEq in 100 mL IVPB   "Every 1 Hour         04/03/22 0411    04/03/22 0500  metoclopramide (REGLAN) injection 10 mg  Every 6 Hours,   Status:  Discontinued         04/03/22 0411    04/03/22 0500  magnesium sulfate 2g/50 mL (PREMIX) infusion  Once         04/03/22 0411    04/03/22 0437  PT Consult: Eval & Treat Functional Mobility Below Baseline  Once         04/03/22 0436    04/03/22 0437  OT Consult: Eval & Treat  Once         04/03/22 0436    04/03/22 0409  droperidol (INAPSINE) injection 1.25 mg  Every 6 Hours PRN         04/03/22 0411    04/03/22 0409  ondansetron (ZOFRAN) tablet 4 mg  Every 6 Hours PRN        \"Or\" Linked Group Details    04/03/22 0411    04/03/22 0409  ondansetron (ZOFRAN) injection 4 mg  Every 6 Hours PRN        \"Or\" Linked Group Details    04/03/22 0411    04/03/22 0409  Lipase  STAT         04/03/22 0411    04/03/22 0409  Hemoglobin A1c  STAT         04/03/22 0411    04/03/22 0407  Activity - Ad Myra  Until Discontinued         04/03/22 0411    04/03/22 0407  Diet Clear Liquid  Diet Effective Now         04/03/22 0411    04/03/22 0406  Intake & Output  Every Shift       04/03/22 0411    04/03/22 0406  Weigh Patient  Once         04/03/22 0411    04/03/22 0406  Oxygen Therapy- Nasal Cannula; Titrate for SPO2: 90% - 95%  Continuous         04/03/22 0411    04/03/22 0406  Insert Peripheral IV  Once         04/03/22 0411    04/03/22 0406  Saline Lock & Maintain IV Access  Continuous         04/03/22 0411    04/03/22 0406  VTE Prophylaxis Not Indicated: Other: Patient Currently Anticoagulated / Receiving Prophylaxis  Once         04/03/22 0411    04/03/22 0406  Code Status and Medical Interventions:  Continuous         04/03/22 0411    04/03/22 0405  sodium chloride 0.9 % flush 10 mL  As Needed         04/03/22 0411    04/03/22 0404  promethazine (PHENERGAN) tablet 25 mg  Every 6 Hours PRN         04/03/22 0411    04/03/22 0404  prochlorperazine (COMPAZINE) tablet 10 mg  Every 8 Hours PRN         04/03/22 0411    " "04/03/22 0403  methocarbamol (ROBAXIN) tablet 500 mg  2 Times Daily PRN         04/03/22 0411    04/03/22 0403  zolpidem (AMBIEN) tablet 5 mg  Nightly PRN         04/03/22 0411    04/03/22 0403  butalbital-acetaminophen-caffeine (FIORICET, ESGIC) -40 MG per tablet 2 tablet  Every 4 Hours PRN         04/03/22 0411    04/03/22 0233  Inpatient Nutrition Consult  Once        Provider:  (Not yet assigned)    04/03/22 0232    04/03/22 0135  STAT Lactic Acid, Reflex  PROCEDURE ONCE         04/02/22 2306    04/03/22 0045  Initiate Observation Status  Once         04/03/22 0045    04/02/22 2339  cefTRIAXone (ROCEPHIN) in SWFI 1 gram/10ml IV PUSH syringe  Once         04/02/22 2337    04/02/22 2300  potassium chloride 10 mEq in 100 mL IVPB  Once         04/02/22 2203 04/02/22 2258  Urine Culture - Urine, Urine, Catheter  Once         04/02/22 2258 04/02/22 2252  Urinalysis, Microscopic Only - Urine, Catheter  Once         04/02/22 2251 04/02/22 2214  iopamidol (ISOVUE-300) 61 % injection 100 mL  Once in Imaging         04/02/22 2212 04/02/22 2205  potassium chloride 10 mEq in 100 mL IVPB  Once         04/02/22 2203 04/02/22 2204  Vital Signs  Per Hospital Policy         04/02/22 2204 04/02/22 2204  Check temperature  Once         04/02/22 2204 04/02/22 2125  lactated ringers bolus 500 mL  Once         04/02/22 2123 04/02/22 2125  ondansetron (ZOFRAN) injection 4 mg  Once         04/02/22 2123 04/02/22 2124  CBC Auto Differential  Once         04/02/22 2123 04/02/22 2123  Insert peripheral IV  Once        \"And\" Linked Group Details    04/02/22 2123 04/02/22 2123  Pulse Oximetry, Continuous  Continuous         04/02/22 2123 04/02/22 2123  CT Abdomen Pelvis With Contrast  1 Time Imaging         04/02/22 2123 04/02/22 2122  sodium chloride 0.9 % flush 10 mL  As Needed        \"And\" Linked Group Details    04/02/22 2123 04/02/22 2122  COVID-19 and FLU A/B PCR - Swab, Nasopharynx  Once " "        04/02/22 2123 04/02/22 2122  CBC & Differential  Once         04/02/22 2123 04/02/22 2122  Comprehensive Metabolic Panel  Once         04/02/22 2123 04/02/22 2122  Urinalysis With Culture If Indicated - Urine, Catheter  Once         04/02/22 2123 04/02/22 2122  Blood Culture - Blood, Hand, Left  Once         04/02/22 2123 04/02/22 2122  Blood Culture - Blood, Arm, Right  Once         04/02/22 2123 04/02/22 2122  Lactic Acid, Plasma  Once         04/02/22 2123 04/02/22 2122  Procalcitonin  Once         04/02/22 2123 04/02/22 2122  T4, Free  Once         04/02/22 2123 04/02/22 2122  TSH  Once         04/02/22 2123 04/02/22 2122  Magnesium  Once         04/02/22 2123 04/02/22 2122  XR Chest 1 View  1 Time Imaging         04/02/22 2123 04/02/22 2111  Cardiac Monitoring  Once         04/02/22 2110 04/02/22 2111  Pulse Oximetry, Continuous  Continuous,   Status:  Canceled         04/02/22 2110 04/02/22 2111  Orthostatic Vitals  Once         04/02/22 2110 04/02/22 2111  Insert peripheral IV  Once        \"And\" Linked Group Details    04/02/22 2110 04/02/22 2110  sodium chloride 0.9 % flush 10 mL  As Needed        \"And\" Linked Group Details    04/02/22 2110 04/02/22 2054  Insert peripheral IV  Once         04/02/22 2053 04/02/22 2054  [place] Draw  Once         04/02/22 2053 04/02/22 2054  Green Top (Gel)  PROCEDURE ONCE         04/02/22 2054 04/02/22 2054  Lavender Top  PROCEDURE ONCE         04/02/22 2054 04/02/22 2054  Red Top  PROCEDURE ONCE         04/02/22 2054 04/02/22 2054  Light Blue Top  PROCEDURE ONCE         04/02/22 2054 04/02/22 2053  sodium chloride 0.9 % flush 10 mL  As Needed         04/02/22 2053    --  metoclopramide (REGLAN) 5 MG/5ML solution  3 Times Daily Before Meals         04/03/22 0226    --  rizatriptan (MAXALT) 10 MG tablet  Once As Needed         04/03/22 0226    --  butalbital-acetaminophen-caffeine (FIORICET, ESGIC) " -40 MG per tablet  Every 4 Hours PRN         04/03/22 0226    --  ondansetron ODT (ZOFRAN-ODT) 4 MG disintegrating tablet  4 Times Daily PRN         04/03/22 0226    --  eszopiclone (LUNESTA) 3 MG tablet  Nightly         04/03/22 0226    --  traZODone (DESYREL) 100 MG tablet  Nightly         04/03/22 0226    --  promethazine (PHENERGAN) 25 MG tablet  Every 6 Hours PRN         04/03/22 0226    --  methocarbamol (ROBAXIN) 500 MG tablet  3 Times Daily PRN         04/03/22 0226    --  prochlorperazine (COMPAZINE) 10 MG tablet  Every 8 Hours PRN         04/03/22 0226    --  FLUoxetine (PROzac) 40 MG capsule  Daily         04/03/22 0226    --  rivaroxaban (XARELTO) 20 MG tablet  Nightly         04/03/22 0226    --  oxymetazoline (AFRIN) 0.05 % nasal spray  2 Times Daily         04/03/22 0226    --  SCANNED - TELEMETRY           04/02/22 0000    --  Calcium Carbonate-Simethicone 750-80 MG chewable tablet  Daily         04/03/22 1548    --  pantoprazole (PROTONIX) 20 MG EC tablet  Daily         04/03/22 1602                 Physician Progress Notes (last 48 hours)      Fermin Mcclure,  at 04/04/22 1203              [facility] Medicine Services  INPATIENT PROGRESS NOTE    Patient Name: Namita Zabala  Date of Admission: 4/2/2022  Today's Date: 04/04/22  Length of Stay: 0  Primary Care Physician: David Lara MD    Subjective   Chief Complaint: Nausea.   HPI   Still very nauseous, but no recent vomiting.    Still awaiting her morning lab draw. Sammi called down and they will be coming up to do it.     GI recommends abdominal ultrasound and possible HIDA scan.  Placed on Reglan.    We will get an opinion from urology today about her percutaneous nephrostomy tube and ureteral stent in the face of recurrent urinary tract infection.    Still complaining about her ears feeling full.  I obtained an otoscope today and did not see anything significant on exam.    Review of  Systems   All pertinent negatives and positives are as above. All other systems have been reviewed and are negative unless otherwise stated.     Objective    Temp:  [97.5 °F (36.4 °C)-98.4 °F (36.9 °C)] 98 °F (36.7 °C)  Heart Rate:  [] 102  Resp:  [16-18] 18  BP: (104-123)/(68-84) 114/73  Physical Exam  Constitutional:       Appearance: She is well-developed.      Comments: Up in bed.  No distress.  No family present.  Discussed with her nurse, Sammi.   HENT:      Head: Normocephalic and atraumatic.      Right Ear: Tympanic membrane, ear canal and external ear normal. There is no impacted cerumen.      Left Ear: Tympanic membrane, ear canal and external ear normal. There is no impacted cerumen.   Eyes:      Conjunctiva/sclera: Conjunctivae normal.      Pupils: Pupils are equal, round, and reactive to light.   Neck:      Vascular: No JVD.   Cardiovascular:      Rate and Rhythm: Normal rate and regular rhythm.      Heart sounds: Normal heart sounds. No murmur heard.    No friction rub. No gallop.   Pulmonary:      Effort: Pulmonary effort is normal. No respiratory distress.      Breath sounds: Normal breath sounds. No wheezing or rales.   Chest:      Chest wall: No tenderness.   Abdominal:      General: Bowel sounds are normal. There is no distension.      Palpations: Abdomen is soft.      Tenderness: There is no abdominal tenderness.   Musculoskeletal:         General: No tenderness or deformity. Normal range of motion.      Cervical back: Neck supple.   Skin:     General: Skin is warm and dry.      Findings: No rash.   Neurological:      General: No focal deficit present.      Mental Status: She is alert and oriented to person, place, and time.      Cranial Nerves: No cranial nerve deficit.      Motor: Weakness present. No abnormal muscle tone.      Deep Tendon Reflexes: Reflexes normal.   Psychiatric:      Comments: Flat affect, but conversational.       Results Review:  I have reviewed the labs, radiology  results, and diagnostic studies.    Laboratory Data:   Results from last 7 days   Lab Units 04/03/22  0713 04/02/22  2113   WBC 10*3/mm3 10.11 9.07   HEMOGLOBIN g/dL 11.4* 14.4   HEMATOCRIT % 32.8* 42.3   PLATELETS 10*3/mm3 325 476*     Results from last 7 days   Lab Units 04/03/22  1824 04/03/22  0713 04/02/22  2113   SODIUM mmol/L  --  137 137   POTASSIUM mmol/L 3.5 2.5* 2.6*   CHLORIDE mmol/L  --  97* 90*   CO2 mmol/L  --  25.0 27.0   BUN mg/dL  --  13 13   CREATININE mg/dL  --  0.95 1.11*   CALCIUM mg/dL  --  8.7 10.6*   BILIRUBIN mg/dL  --  0.6 1.2   ALK PHOS U/L  --  100 149*   ALT (SGPT) U/L  --  13 20   AST (SGOT) U/L  --  18 28   GLUCOSE mg/dL  --  100* 97     Culture Data:   Blood Culture   Date Value Ref Range Status   04/02/2022 No growth at 24 hours  Preliminary   04/02/2022 Abnormal Stain (C)  Preliminary     Urine Culture   Date Value Ref Range Status   04/02/2022 Scant growth (1+) Gram Negative Bacilli (A)  Preliminary     I have reviewed the patient's current medications.     Assessment/Plan     Active Hospital Problems    Diagnosis    • **Intractable nausea and vomiting    • UTI (urinary tract infection), bacterial    • Sepsis secondary to UTI (HCC)    • Lactic acidosis    • Hypokalemia    • Malfunction of nephrostomy tube (HCC)    • Essential (primary) hypertension      Plan:  The patient was admitted on 4/3 by Dr. Ricks.  She presented with complaints of intractable nausea and vomiting.  Her medical problems started last August when she developed COVID-19 pneumonia and had numerous complications thereafter.  She developed an abdominal hematoma and had to go on dialysis secondary to extrinsic compression of her urinary system.  She was transferred from here to [facility] and required exploratory laparotomy.  She ended up having bilateral percutaneous nephrostomy tubes and also ended up with a left ureteral stent.  She states that her main urologist is Dr. Rina Rey.  She states  "that she stopped producing urine from her percutaneous nephrostomy tube recently and that there are plans for it to be removed.  She states that she had gotten in touch with [facility] to see if someone here could do it so she would not have to travel.     When she left [facility], she spent considerable time at a skilled nursing facility in [place], [place].  She states that she has only been home a few weeks.  She was admitted to [facility] [place], operated by [facility] in [place] on 3/9 for electrolyte abnormalities.     Renal function stable yesterday with a creatinine 0.95.  Dr. Ricks began treating a urinary tract infection with ceftriaxone.  She had a multidrug-resistant Klebsiella in October 2021.  I transitioned her to Invanz on 4/3. Down to 1.7 from 2.8 after fluids.  Urine does appear infected with too numerous to count white blood cells, 4+ bacteria and large leukocyte esterase.  The sample was also bloody.  Urine culture growing gram-negative bacilli so far.  Lactic acidosis improved after fluids and antibiotics.   Procalcitonin elevated at 0.31.  I would like for her to be evaluated by urology.  Dr. Dyer is familiar with her previous problems as he saw her last year.     Blood cultures are likely going to be a contaminant with coagulase-negative Staphylococcus.  Follow to completion.    Replace potassium aggressively. Multiple runs ordered and it is also in her fluids. \"I have always had potassium problems.\"  Potassium 3.5 last night.  For some reason, morning labs have not been drawn.     Dr. Ricks asked for a GI opinion.  Dr. Babin saw and recommended an abdominal ultrasound and perhaps a HIDA scan thereafter.  Reglan 5 mg every 6 hours.  No significant plans for endoscopy at this time. Her nausea and vomiting could simply be related to her untreated urinary issues.     She is complaining of sinus congestion and her ears have been painful.  Flonase has not helped.  Corticosporin eardrops.     DVT " prophylaxis is achieved by virtue of being on Xarelto.    Discharge Planning: I expect the patient to be discharged to home in 2-3 days.    Electronically signed by Fermin Mcclure DO, 04/04/22, 12:03 CDT.      Electronically signed by Fermin Mcclure DO at 04/04/22 1221     Fermin Mcclure DO at 04/03/22 9608          Patient was admitted after midnight by Dr. Ricks.    Discussed with her nurse, [place].     She presented with complaints of intractable nausea and vomiting.      Her medical problems started last August when she developed COVID-19 pneumonia and had numerous complications thereafter.  She developed an abdominal hematoma and had to go on dialysis secondary to extrinsic compression of her urinary system.  She was transferred from here to [facility] and required exploratory laparotomy.  She ended up having bilateral percutaneous nephrostomy tubes and also ended up with a left ureteral stent.  She states that her main urologist is Dr. Rina Rey.  She states that she stopped producing urine from her percutaneous nephrostomy tube recently and that there are plans for it to be removed.  She states that she had gotten in touch with [facility] to see if someone here could do it so she would not have to travel.    When she left [facility], she spent considerable time at a skilled nursing facility in [place], [place].  She states that she has only been home a few weeks.  She was admitted to [facility] in [place] on 3/9 for electrolyte abnormalities.    Renal function stable today with a creatinine 0.95.  Dr. Ricks is treating a urinary tract infection with ceftriaxone.  She had a multidrug-resistant Klebsiella in October 2021.  I would like to transition her to [facility] for now. Lactic acidosis improved after fluids and antibiotics.  Down to 1.7 from 2.8.  Urine does appear infected with too numerous to count white blood cells, 4+ bacteria and large leukocyte esterase.  The  "sample is also bloody.  Procalcitonin elevated at 0.31.     Replace potassium aggressively. Multiple runs ordered and it is also in her fluids. \"I have always had potassium problems.\"    Dr. Ricks asked for a GI opinion.  Dr. Babin to evaluate.  Not sure that anything will be necessary from his standpoint.  Her nausea and vomiting could simply be related to her untreated urinary issues.    She is complaining of sinus congestion and her ears have been painful.  Flonase has not helped.  Corticosporin eardrops.    Active Hospital Problems    Diagnosis    • **Intractable nausea and vomiting    • UTI (urinary tract infection), bacterial    • Sepsis secondary to UTI (HCC)    • Lactic acidosis    • Hypokalemia    • Malfunction of nephrostomy tube (HCC)    • Essential (primary) hypertension        Electronically signed by Fermin Mcclure DO, 22, 2:38 PM CDT.      Electronically signed by Fermin Mcclure DO at 22 1458          Consult Notes (last 48 hours)      Jose Babin MD at 22 1831      Consult Orders    1. Inpatient Gastroenterology Consult [[id number]] ordered by Frank Ricks MD at 22 0411                   [facility]    Inpatient Gastroenterology Service    Report of Consultation    Jose Babin MD, FACP    4/3/2022  18:31 CDT    Patient: Namita Zabala  : 1975  MRN: [id number]  Date of Admission: 2022    Consultation received from:    Saul clerk: routine      Consult received:  @0819        Patient referred for evaluation of nausea, emesis, abdominal pain    History is obtained from the patient, the family, the EMR, and appears generally reliable, although the patient is unclear about many details        Chief complaint  -unable to eat        History of Present Illness  -46 y.o. female reports a three week history of postprandial emesis, typically without nausea.  She has some abdominal discomfort.  She has had a protracted and " complex clinical course post-SARS-2.        Gastroenterology Review of Systems:  -no report of hematemesis, hematochezia, melena, diarrhea, constipation, pyrosis, regurgitation, dysphagia, odynophagia, altered bowel habit, change in stool, choluria, acholic stool, or jaundice        Past Medical History:   Diagnosis Date   • Angina at rest (HCC)    • Migraine     Sees Pain Management for injections.    • MVP (mitral valve prolapse)    • Renal disorder    • Syncope    · SARS-2  · PUD  · Pulmonary embolism        Past Surgical History:   Procedure Laterality Date   • BREAST BIOPSY Right 2011    benign   • INSERTION HEMODIALYSIS CATHETER Left 9/16/2021    Procedure: HEMODIALYSIS CATHETER INSERTION;  Surgeon: Anders Kaur MD;  Location: [place] [address];  Service: Vascular;  Laterality: Left;   • TONSILLECTOMY             Past Endoscopy History  -no prior EGD  -no prior colonoscopy        Past Anesthesia/Sedation History  -no known prior issues with anesthesia or sedation        Past Procedural History  -no prior heart cath        Family History  -CRC (aunt); otherwise, no report of family history of: gastric cancer, pancreatic cancer, colorectal cancer, breast cancer, ovarian cancer, uterine cancer, colorectal polyps, inflammatory bowel disease, Crohn's disease, ulcerative colitis, celiac disease, peptic ulcer disease, pancreatitis, hepatitis, cirrhosis        Social History  -  -tobacco use: denies   -ethanol use: denies  -illicit/recreational substance and THC use: denies        Medications  -see EMR lists  -Home medications of Record:  Calcium Carbonate-Simethicone, FLUoxetine, butalbital-acetaminophen-caffeine, eszopiclone, methocarbamol, metoclopramide, ondansetron ODT, oxymetazoline, pantoprazole, prochlorperazine, promethazine, rivaroxaban, rizatriptan, and traZODone    Last dose of rivaroxaban is unknown        Physical Exam  Vital Signs:  Temp:  [97.8 °F (36.6 °C)-98.4 °F (36.9 °C)] 98.4 °F  (36.9 °C)  Heart Rate:  [102-116] 102  Resp:  [16-28] 16  BP: (115-133)/() 115/74    General  -appears stated age  -no acute distress  -no outstanding physical abnormality    HEENT  -normocephalic  -atraumatic  -no drainage  -no bleeding  -mucosa appears moist     Neurological  -alert  -oriented  -normal speech  -no slowed response  -no tremor  -no obvious focal deficit  -moves all extremities without obvious abnormality    Psychiatric  -mildly dysphoric mood  -appropriate responses to questions  -no unusual behaviors noted    Skin (evaluation limited to exposed skin)  -dry  -no diaphoresis  -no icterus  -no rash     Neck  -supple  -normal passive ROM  -no JVD    Pulmonary  -normal respiratory effort  -no respiratory distress  -no stridor    Cardiovascular  -normal rate  -regular rhythm    Abdomen  -benign  -soft  -protuberant        Laboratory of Note  -see EMR  -hypokalemic with [Mg] lower end of normal        Imaging of Note  -see EMR  Narrative & Impression   EXAMINATION:   CT ABDOMEN PELVIS W CONTRAST-  4/3/2022 7:43 AM CDT     HISTORY: CT ABDOMEN AND PELVIS WITH CONTRAST 4/2/2022 10:09 PM CDT     HISTORY: Vomiting     COMPARISON: October 15, 2021.      DLP: 249 mGy cm Automated exposure control was also utilized to decrease  patient radiation dose.     TECHNIQUE: Following the intravenous administration of contrast, helical  CT tomographic images of the abdomen and pelvis were acquired. Coronal  reformatted images were also provided for review.      FINDINGS:   The lung bases and base of the heart are unremarkable.      LIVER: Decreased CT attenuation liver is noted consistent with hepatic  steatosis..      BILIARY SYSTEM: The gallbladder is unremarkable. No intrahepatic or  extrahepatic ductal dilatation.      PANCREAS: No focal pancreatic lesion.      SPLEEN: Unremarkable.      KIDNEYS AND ADRENALS: Adrenal glands are visualized. The right renal  contour is unremarkable.     The left renal contour is  visualized. A percutaneous left nephrostomy  catheter is present. A double pigtail left ureteral catheters present  satisfactorily position.. There may be mild enhancement of the left  renal pelvis. This may be reactive or possibly infectious. There is no  perinephric stranding. The right ureters unremarkable.     RETROPERITONEUM: No mass, lymphadenopathy or hemorrhage.      GI TRACT: No evidence of obstruction or bowel wall thickening. The  appendix is visualized and unremarkable.     OTHER: There is no mesenteric mass, lymphadenopathy or fluid collection.  The abdominopelvic vasculature is patent. The osseous structures and  soft tissues demonstrate no worrisome lesions.     PELVIS: No mass lesion, fluid collection or significant lymphadenopathy  is seen in the pelvis. The urinary bladder wall enhances. This may be  reactive. This may be inflammation..     IMPRESSION:  1. Left-sided external nephrostomy stent is present. Left internal  double pigtail ureteral stent catheters present. Mild enhancement of the  wall of the left renal pelvis and bladder is noted possibly an  infectious etiology.  2. Hepatic steatosis.     These images initially reviewed by stat rad at 2242 hours.         This report was finalized on 04/03/2022 07:48 by Dr. Nadeem Marshall MD.             Assessment  -post prandial emesis, intractable.  Only occasional nausea.  Modest improvement since admission and initiating treatment for UTI.  Patient is on Xarelto.  -endoscopy in patients on DAOACs: endoscopic interventions begin to return to effectiveness after 48 hours (96 for Pradaxa), and should be at near normal functionality after 72 hours (120/Pradaxa) off of the drug.  In addition, during the first 48--72 hours, the potential for worsening intra-lumenal bleeding and/or causing other bleeding undetectable by endoscopy (I.e., hemothorax, hemoperitoneum) is felt to be prohibitive.  If bleeding is persistent at 48 hours, endoscopy can be  attempted at that time.  Otherwise, would wait until post-72 hours.  -UTI        Recommendations  -consider ultrasound of the abdomen, possibly with HIDA to follow depending upon result and clinical course  -EGD would be reasonable, but she would need to be off of Xarelto for 72 hours prior.  I am not sure if this is practicable given her history of pulmonary embolism.  -would change metoclopramide to TID schedule to minimize tachyphylaxis.  5 mg doses may be as effective as 10 mg, also with less tachyphylaxis.  -avoid agents toxic to GI tract mucosa, anticoagulant/antiplatelet agents, as clinically practicable          Thank you for allowing us to participate in the care of this patient.    Sincerely,    SHREYA Babin MD, [facility]  [facility]                                Electronically signed by Jose Babin MD at 04/03/22 1374

## 2022-04-04 NOTE — PLAN OF CARE
"Goal Outcome Evaluation:  Plan of Care Reviewed With: patient        Progress: no change  Outcome Evaluation: Pt A/Ox4. Pt appears to be drowsy for most of the shift. Denies having any pain. Voiding per PW. It was reported that staph is growing in blood cultures, Dr. Ricks notified and didn't place any new orders. Pt c/o having \"clogged up ears', scheduled ear drops given. ST-SR on tele. VSS. K+ lab redrawn and came back WDL. IVF infusing as ordered. Safety maintained.  "

## 2022-04-04 NOTE — PLAN OF CARE
Goal Outcome Evaluation:              Outcome Evaluation: NTN assessment complete. MSA complete. NPO for abd US tomorrow. If AMONS appropriate with POC, RD available for recommendations. NTN following.

## 2022-04-04 NOTE — PLAN OF CARE
Goal Outcome Evaluation:  Plan of Care Reviewed With: patient        Progress: no change  Outcome Evaluation: Pt is known to OT/PT from prior admission. Pt required max encouragement for minimal participation during last admit. Pt and VEGAS discussed benefits of tx. Pt declined rehab services this date and for current future. Pt does not wish to participate while she is feeling sick. OT and PT to sign off at present.

## 2022-04-05 ENCOUNTER — APPOINTMENT (OUTPATIENT)
Dept: ULTRASOUND IMAGING | Facility: HOSPITAL | Age: 47
End: 2022-04-05

## 2022-04-05 PROBLEM — E43 SEVERE MALNUTRITION: Status: ACTIVE | Noted: 2022-04-05

## 2022-04-05 PROBLEM — E83.39 HYPOPHOSPHATEMIA: Status: ACTIVE | Noted: 2022-04-05

## 2022-04-05 LAB
ANION GAP SERPL CALCULATED.3IONS-SCNC: 11 MMOL/L (ref 5–15)
BACTERIA SPEC AEROBE CULT: ABNORMAL
BUN SERPL-MCNC: 7 MG/DL (ref 6–20)
BUN/CREAT SERPL: 9.9 (ref 7–25)
CALCIUM SPEC-SCNC: 7.4 MG/DL (ref 8.6–10.5)
CHLORIDE SERPL-SCNC: 112 MMOL/L (ref 98–107)
CO2 SERPL-SCNC: 18 MMOL/L (ref 22–29)
CREAT SERPL-MCNC: 0.71 MG/DL (ref 0.57–1)
DEPRECATED RDW RBC AUTO: 49.8 FL (ref 37–54)
EGFRCR SERPLBLD CKD-EPI 2021: 107.7 ML/MIN/1.73
ERYTHROCYTE [DISTWIDTH] IN BLOOD BY AUTOMATED COUNT: 15.9 % (ref 12.3–15.4)
GLUCOSE SERPL-MCNC: 89 MG/DL (ref 65–99)
GRAM STN SPEC: ABNORMAL
GRAM STN SPEC: ABNORMAL
HCT VFR BLD AUTO: 30.1 % (ref 34–46.6)
HGB BLD-MCNC: 9.6 G/DL (ref 12–15.9)
ISOLATED FROM: ABNORMAL
MCH RBC QN AUTO: 27.6 PG (ref 26.6–33)
MCHC RBC AUTO-ENTMCNC: 31.9 G/DL (ref 31.5–35.7)
MCV RBC AUTO: 86.5 FL (ref 79–97)
PHOSPHATE SERPL-MCNC: 2.2 MG/DL (ref 2.5–4.5)
PLATELET # BLD AUTO: 303 10*3/MM3 (ref 140–450)
PMV BLD AUTO: 8.9 FL (ref 6–12)
POTASSIUM SERPL-SCNC: 4.4 MMOL/L (ref 3.5–5.2)
RBC # BLD AUTO: 3.48 10*6/MM3 (ref 3.77–5.28)
SODIUM SERPL-SCNC: 141 MMOL/L (ref 136–145)
WBC NRBC COR # BLD: 7.64 10*3/MM3 (ref 3.4–10.8)

## 2022-04-05 PROCEDURE — 85027 COMPLETE CBC AUTOMATED: CPT | Performed by: INTERNAL MEDICINE

## 2022-04-05 PROCEDURE — 25010000002 SODIUM CHLORIDE 0.9 % WITH KCL 20 MEQ 20-0.9 MEQ/L-% SOLUTION: Performed by: INTERNAL MEDICINE

## 2022-04-05 PROCEDURE — 25010000002 METOCLOPRAMIDE PER 10 MG: Performed by: INTERNAL MEDICINE

## 2022-04-05 PROCEDURE — 25010000002 ENOXAPARIN PER 10 MG: Performed by: INTERNAL MEDICINE

## 2022-04-05 PROCEDURE — 84100 ASSAY OF PHOSPHORUS: CPT | Performed by: INTERNAL MEDICINE

## 2022-04-05 PROCEDURE — 80048 BASIC METABOLIC PNL TOTAL CA: CPT | Performed by: INTERNAL MEDICINE

## 2022-04-05 PROCEDURE — 25010000002 ONDANSETRON PER 1 MG: Performed by: INTERNAL MEDICINE

## 2022-04-05 PROCEDURE — 99222 1ST HOSP IP/OBS MODERATE 55: CPT | Performed by: UROLOGY

## 2022-04-05 PROCEDURE — 76705 ECHO EXAM OF ABDOMEN: CPT

## 2022-04-05 PROCEDURE — 25010000002 ERTAPENEM PER 500 MG: Performed by: INTERNAL MEDICINE

## 2022-04-05 RX ORDER — SODIUM BICARBONATE 650 MG/1
650 TABLET ORAL 2 TIMES DAILY
Status: DISCONTINUED | OUTPATIENT
Start: 2022-04-05 | End: 2022-04-13

## 2022-04-05 RX ADMIN — Medication 2 SPRAY: at 20:27

## 2022-04-05 RX ADMIN — METOCLOPRAMIDE HYDROCHLORIDE 5 MG: 5 INJECTION INTRAMUSCULAR; INTRAVENOUS at 23:46

## 2022-04-05 RX ADMIN — TRAZODONE HYDROCHLORIDE 100 MG: 100 TABLET ORAL at 20:27

## 2022-04-05 RX ADMIN — ENOXAPARIN SODIUM 60 MG: 60 INJECTION SUBCUTANEOUS at 18:34

## 2022-04-05 RX ADMIN — METOCLOPRAMIDE HYDROCHLORIDE 5 MG: 5 INJECTION INTRAMUSCULAR; INTRAVENOUS at 11:10

## 2022-04-05 RX ADMIN — PANTOPRAZOLE SODIUM 40 MG: 40 INJECTION, POWDER, FOR SOLUTION INTRAVENOUS at 08:04

## 2022-04-05 RX ADMIN — Medication 10 ML: at 08:04

## 2022-04-05 RX ADMIN — POTASSIUM CHLORIDE AND SODIUM CHLORIDE 100 ML/HR: 900; 150 INJECTION, SOLUTION INTRAVENOUS at 01:29

## 2022-04-05 RX ADMIN — NEOMYCIN SULFATE, POLYMYXIN B SULFATE AND HYDROCORTISONE 4 DROP: 10; 3.5; 1 SUSPENSION/ DROPS AURICULAR (OTIC) at 23:46

## 2022-04-05 RX ADMIN — NEOMYCIN SULFATE, POLYMYXIN B SULFATE AND HYDROCORTISONE 4 DROP: 10; 3.5; 1 SUSPENSION/ DROPS AURICULAR (OTIC) at 05:47

## 2022-04-05 RX ADMIN — ONDANSETRON 4 MG: 2 INJECTION INTRAMUSCULAR; INTRAVENOUS at 14:07

## 2022-04-05 RX ADMIN — POTASSIUM PHOSPHATE, MONOBASIC AND POTASSIUM PHOSPHATE, DIBASIC 30 MMOL: 224; 236 INJECTION, SOLUTION, CONCENTRATE INTRAVENOUS at 11:23

## 2022-04-05 RX ADMIN — Medication 10 ML: at 20:27

## 2022-04-05 RX ADMIN — FLUTICASONE PROPIONATE 2 SPRAY: 50 SPRAY, METERED NASAL at 20:27

## 2022-04-05 RX ADMIN — Medication 2 SPRAY: at 08:04

## 2022-04-05 RX ADMIN — SODIUM BICARBONATE 650 MG: 650 TABLET ORAL at 11:10

## 2022-04-05 RX ADMIN — METOCLOPRAMIDE HYDROCHLORIDE 5 MG: 5 INJECTION INTRAMUSCULAR; INTRAVENOUS at 05:47

## 2022-04-05 RX ADMIN — NEOMYCIN SULFATE, POLYMYXIN B SULFATE AND HYDROCORTISONE 4 DROP: 10; 3.5; 1 SUSPENSION/ DROPS AURICULAR (OTIC) at 17:02

## 2022-04-05 RX ADMIN — NEOMYCIN SULFATE, POLYMYXIN B SULFATE AND HYDROCORTISONE 4 DROP: 10; 3.5; 1 SUSPENSION/ DROPS AURICULAR (OTIC) at 11:10

## 2022-04-05 RX ADMIN — SODIUM BICARBONATE 650 MG: 650 TABLET ORAL at 20:27

## 2022-04-05 RX ADMIN — METOCLOPRAMIDE HYDROCHLORIDE 5 MG: 5 INJECTION INTRAMUSCULAR; INTRAVENOUS at 17:00

## 2022-04-05 RX ADMIN — FLUTICASONE PROPIONATE 2 SPRAY: 50 SPRAY, METERED NASAL at 08:04

## 2022-04-05 RX ADMIN — PANTOPRAZOLE SODIUM 40 MG: 40 INJECTION, POWDER, FOR SOLUTION INTRAVENOUS at 20:27

## 2022-04-05 RX ADMIN — FLUOXETINE HYDROCHLORIDE 40 MG: 20 CAPSULE ORAL at 10:19

## 2022-04-05 RX ADMIN — ERTAPENEM SODIUM 1 G: 1 INJECTION, POWDER, LYOPHILIZED, FOR SOLUTION INTRAMUSCULAR; INTRAVENOUS at 17:00

## 2022-04-05 NOTE — PLAN OF CARE
Goal Outcome Evaluation:  Plan of Care Reviewed With: patient        Progress: no change  Outcome Evaluation: Pt A/Ox4. Flat affect. Pt states that she is having some difficulty hearing r/t her congested ears. Pt refuses to be repositioned at times. IVF infusing as ordered. Pt states that she overall doesn't feel good, RN asks pt to specify on what is wrong and pt states that she is dizzy and that she feels like she is going to throw up. PRN Zofran given with good relief noted. Denies having pain. Encouraged ROM in all extremities. Voiding per PW. VSS. NPO after MN for US of abdomen today. Safety maintained.

## 2022-04-05 NOTE — CONSULTS
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 Zabala is a pleasant 46 year old female with a complex, protracted and unfortunate clinical course.    She was originally admitted to [facility] in August of 2021 for COVID with pneumonia. She was found to have a PE and started on therapy. She developed a severe pelvic hematoma with severe displacement of the urinary bladder with mass effect. The decision was made to observe for resolution of hematoma. She developed renal failure that was likely multifactorial including an element of obstruction from mass effect and displacement of the bladder. At that time, ureteral stents were not an option given the displacement of the bladder and percutaneous tubes were not indicated by radiology given the minimal upper tract dilation. She was eventually discharged to SNF.    She presented again to [facility] just a few days ago with intractable nausea and emesis. She was admitted to the hospitalist service.    In the interim between her initial admission here last year and this current admission, there appears to have been significant clinical changes in her course. Per review of Care Everywhere, she developed a necrotizing infection, secondary to superinfection of the hematoma, involving the the urinary bladder and distal left ureter. She underwent surgery at [facility] and her care has been managed there since. She underwent exploratory laparotomy on 10/16/2021 and had numerous returns to OR for debridement and wash out. [facility] was involved for complex bladder repair with multiple defects. She had a persistent leak. She underwent bilateral percutaneous  "nephrostomy tube placement with IR on 10/20/202 to divert urine given persistent JAREK output. She has been cared for by [facility] since that time.    Pertinent recent notes:    01/19/2022 - R PCN replaced with PCNU and L PCN replaced with PCNU with [facility]. Persistent left ureteral leak noted on antegrade urography.     02/10/2022 - R PCNU removed in [facility] clinic. Plan in that note is to schedule:     \"Evaluation in the OR with retrograde and ureteroscopy (with possible stricture dilation/incision at the time if no extrav and stricture present). If extrav is present, we could internalize her to a JJ stent as well.\"    There is concern for ureteral stricture that may require URS +/- biopsy and possible dilation.    Review of Systems  Pertinent items are noted in HPI, all other systems reviewed and negative     History  Past Medical History:   Diagnosis Date   • Angina at rest (HCC)    • Migraine     Sees Pain Management for injections.    • MVP (mitral valve prolapse)    • Renal disorder    • Syncope        Past Surgical History:   Procedure Laterality Date   • BREAST BIOPSY Right 2011    benign   • INSERTION HEMODIALYSIS CATHETER Left 9/16/2021    Procedure: HEMODIALYSIS CATHETER INSERTION;  Surgeon: Anders Kaur MD;  Location: [place] [address];  Service: Vascular;  Laterality: Left;   • TONSILLECTOMY         Family History   Problem Relation Age of Onset   • Colon cancer Maternal Aunt 52   • Fibromyalgia Mother    • Heart disease Mother    • Hypertension Mother    • Hodgkin's lymphoma Paternal Grandmother    • Ovarian cancer Neg Hx    • Breast cancer Neg Hx        Social History     Socioeconomic History   • Marital status:    Tobacco Use   • Smoking status: Never Smoker   • Smokeless tobacco: Never Used   Substance and Sexual Activity   • Alcohol use: No   • Drug use: No       Current Facility-Administered Medications   Medication Dose Route Frequency Provider Last " Rate Last Admin   • butalbital-acetaminophen-caffeine (FIORICET, ESGIC) -40 MG per tablet 2 tablet  2 tablet Oral Q4H PRN Frank Ricks MD       • droperidol (INAPSINE) injection 1.25 mg  1.25 mg Intravenous Q6H PRN Frank Ricks MD       • ertapenem (INVanz) 1 g in sodium chloride 0.9 % 100 mL IVPB-VTB  1 g Intravenous Q24H Fermin Mcclure  mL/hr at 04/04/22 1447 1 g at 04/04/22 1447   • FLUoxetine (PROzac) capsule 40 mg  40 mg Oral Daily Frank Ricks MD   40 mg at 04/04/22 0921   • fluticasone (FLONASE) 50 MCG/ACT nasal spray 2 spray  2 spray Each Nare BID Frank Ricks MD   2 spray at 04/04/22 2109   • methocarbamol (ROBAXIN) tablet 500 mg  500 mg Oral BID PRN Frank Ricks MD       • metoclopramide (REGLAN) injection 5 mg  5 mg Intravenous Q6H Fermin Mcclure DO   5 mg at 04/05/22 0547   • neomycin-polymyxin-hydrocortisone (CORTISPORIN) otic suspension 4 drop  4 drop Both Ears Q6H Fermin Mcclure DO   4 drop at 04/05/22 0547   • ondansetron (ZOFRAN) tablet 4 mg  4 mg Oral Q6H PRN Frank Ricks MD        Or   • ondansetron (ZOFRAN) injection 4 mg  4 mg Intravenous Q6H PRN Frank Ricks MD   4 mg at 04/04/22 2120   • oxymetazoline (AFRIN) nasal spray 2 spray  2 spray Nasal BID Frank Ricks MD   2 spray at 04/04/22 2109   • pantoprazole (PROTONIX) injection 40 mg  40 mg Intravenous Q12H Frank Ricks MD   40 mg at 04/04/22 2108   • prochlorperazine (COMPAZINE) tablet 10 mg  10 mg Oral Q8H PRN Frank Ricks MD       • promethazine (PHENERGAN) tablet 25 mg  25 mg Oral Q6H PRN Frank Ricks MD       • rivaroxaban (XARELTO) tablet 20 mg  20 mg Oral Nightly Frank Ricks MD   20 mg at 04/04/22 2110   • sodium chloride 0.9 % flush 10 mL  10 mL Intravenous PRN Frank Ricks MD       • sodium chloride 0.9 % flush 10 mL  10 mL Intravenous PRN Frank Ricks MD       • sodium chloride 0.9 % flush 10 mL  10 mL Intravenous PRN Frank Ricks  MD NATHALIE       • sodium chloride 0.9 % flush 10 mL  10 mL Intravenous Q12H Frank Ricks MD   10 mL at 04/04/22 2108   • sodium chloride 0.9 % flush 10 mL  10 mL Intravenous PRN Frank Ricks MD       • sodium chloride 0.9 % with KCl 20 mEq/L infusion  100 mL/hr Intravenous Continuous Frank Ricks  mL/hr at 04/05/22 0129 100 mL/hr at 04/05/22 0129   • SUMAtriptan (IMITREX) tablet 50 mg  50 mg Oral Once Frank Ricks MD       • traZODone (DESYREL) tablet 100 mg  100 mg Oral Nightly Frank Ricks MD   100 mg at 04/04/22 2108   • zolpidem (AMBIEN) tablet 5 mg  5 mg Oral Nightly PRN Frank Ricks MD            Allergies   Allergen Reactions   • Coconut Unknown - High Severity   • Nuts Unknown - High Severity   • Penicillins    • [place] Other (See Comments)     Causes migraines per pt reports       Objective     Vital Signs   Temp:  [97.4 °F (36.3 °C)-98.2 °F (36.8 °C)] 97.7 °F (36.5 °C)  Heart Rate:  [] 95  Resp:  [16-18] 16  BP: ()/(63-84) 97/63    Intake/Output Summary (Last 24 hours) at 4/5/2022 0728  Last data filed at 4/5/2022 0700  Gross per 24 hour   Intake 800 ml   Output 1550 ml   Net -750 ml       Physical Exam:    General: Somnolent, cooperative, nontoxic, comfortable  Head:  Normocephalic, without obvious abnormality, atraumatic  Eyes:   Lids and lashes normal, no icterus, no pallor  Ears:  Ears appear intact with no abnormalities noted  Neck:  Supple  Back:  No costovertebral angle tenderness on left  Lungs: Unlabored respirations  Heart:  Regular rhythm and normal rate  Abdomen: Soft, nontender, nondistended  :  Left PCNU tube capped. Entrance appears clean and dry with no signs of infection.  Extremities: Moves all extremities well but movement is slow overall  Skin:  Pale but Warm and dry  Neurologic: Cranial nerves 2 - 12 grossly intact    Data Review:    Urine Culture - Urine, Urine, Catheter (04/02/2022 22:21)   Basic Metabolic Panel (04/04/2022 13:28)    CT Abdomen Pelvis With Contrast (04/02/2022 22:14)       Assessment and Plan:    Complex urological history including pelvic hematoma that developed superinfection resulting in necrotizing bladder infection with complex bladder repair and persistent bladder/ureteral leak with indwelling left nephroureteral stent: This patient will need to continue care with [facility]. Per review of Care Everywhere, their plan was to take her for surgery for cystoscopy, possible ureteroscopy, possible internalization of left ureteral stent. Given the complexity of her clinical course, we would be unable to provide the level of care to assume urologic care of this patient. I would recommend contacting [facility] to alert them of her inpatient status to determine if transfer to their facility is appropriate versus having her follow up as an outpatient with them. Please let me know how I may assist.    Given her complex clinical course, I would recommend continuing empiric antibiotics covering urinary species for now until [facility] is made aware.    UROLOGICAL DISPOSITION: As above. No urologic intervention here but continued complex urologic care is paramount to her care.     I discussed the patient's findings and my recommendations with patient and nursing staff    (Please note that portions of this note were completed with a voice recognition program.)    Junior Dyer MD  04/05/22  07:28 CDT    Time: Time spent: 45 minutes spent performing evaluation and management, chart review, and discussion with patient, > 50% of time spent in face-to-face encounter

## 2022-04-05 NOTE — PLAN OF CARE
Goal Outcome Evaluation:  Plan of Care Reviewed With: patient        Progress: no change  Outcome Evaluation: Pt a&ox4. US abd done today. NPO midnight for HIDA scan in AM. IV abx given and IV phosphate. Pt c/o of stomach hurting and nausea- prn Zofran given with relief noted. Sched ear drops given for c/o of dec hearing and clogged ears. Voiding purewick. VSS. Safety maintained- call light within reach.

## 2022-04-05 NOTE — PROGRESS NOTES
[facility] Medicine Services  INPATIENT PROGRESS NOTE    Patient Name: Namita Zabala  Date of Admission: 4/2/2022  Today's Date: 04/05/22  Length of Stay: 1  Primary Care Physician: David Lara MD    Subjective   Chief Complaint: Nausea.   HPI   Urology with no specific plans.  Patient will need to follow-up at [place] after treatment of her urinary tract infection.    Ultrasound of the abdomen shows a sludge-filled gallbladder, but no other convincing signs of cholecystitis.    Still having problems with nausea, but not much vomiting.    Review of Systems  All pertinent negatives and positives are as above. All other systems have been reviewed and are negative unless otherwise stated.     Objective    Temp:  [97.4 °F (36.3 °C)-98.2 °F (36.8 °C)] 97.6 °F (36.4 °C)  Heart Rate:  [] 107  Resp:  [16] 16  BP: ()/(63-84) 102/74  Physical Exam  Constitutional:       Appearance: She is well-developed.      Comments: Up in bed.  No distress.  No family present.  Discussed with her nurse, Sammi.   HENT:      Head: Normocephalic and atraumatic.   Eyes:      Conjunctiva/sclera: Conjunctivae normal.      Pupils: Pupils are equal, round, and reactive to light.   Neck:      Vascular: No JVD.   Cardiovascular:      Rate and Rhythm: Normal rate and regular rhythm.      Heart sounds: Normal heart sounds. No murmur heard.    No friction rub. No gallop.   Pulmonary:      Effort: Pulmonary effort is normal. No respiratory distress.      Breath sounds: Normal breath sounds. No wheezing or rales.   Chest:      Chest wall: No tenderness.   Abdominal:      General: Bowel sounds are normal. There is no distension.      Palpations: Abdomen is soft.      Tenderness: There is no abdominal tenderness.   Musculoskeletal:         General: No tenderness or deformity. Normal range of motion.      Cervical back: Neck supple.   Skin:     General: Skin is warm and dry.       Findings: No rash.   Neurological:      General: No focal deficit present.      Mental Status: She is alert and oriented to person, place, and time.      Cranial Nerves: No cranial nerve deficit.      Motor: Weakness present. No abnormal muscle tone.      Deep Tendon Reflexes: Reflexes normal.   Psychiatric:      Comments: Flat affect, but conversational.       Results Review:  I have reviewed the labs, radiology results, and diagnostic studies.    Laboratory Data:   Results from last 7 days   Lab Units 04/05/22  0749 04/04/22  1328 04/03/22  0713   WBC 10*3/mm3 7.64 7.29 10.11   HEMOGLOBIN g/dL 9.6* 9.6* 11.4*   HEMATOCRIT % 30.1* 29.2* 32.8*   PLATELETS 10*3/mm3 303 293 325     Results from last 7 days   Lab Units 04/05/22  0748 04/04/22  1328 04/03/22  1824 04/03/22  0713 04/02/22  2113   SODIUM mmol/L 141 141  --  137 137   POTASSIUM mmol/L 4.4 3.7 3.5 2.5* 2.6*   CHLORIDE mmol/L 112* 109*  --  97* 90*   CO2 mmol/L 18.0* 18.0*  --  25.0 27.0   BUN mg/dL 7 8  --  13 13   CREATININE mg/dL 0.71 0.90  --  0.95 1.11*   CALCIUM mg/dL 7.4* 7.8*  --  8.7 10.6*   BILIRUBIN mg/dL  --   --   --  0.6 1.2   ALK PHOS U/L  --   --   --  100 149*   ALT (SGPT) U/L  --   --   --  13 20   AST (SGOT) U/L  --   --   --  18 28   GLUCOSE mg/dL 89 115*  --  100* 97     Results from last 7 days   Lab Units 04/05/22  0748 04/04/22 1328 04/02/22  2113   MAGNESIUM mg/dL  --  2.0 1.7   PHOSPHORUS mg/dL 2.2* 1.2*  --      Culture Data:   Blood Culture   Date Value Ref Range Status   04/02/2022 No growth at 2 days  Preliminary   04/02/2022 Staphylococcus, coagulase negative (C)  Final     Urine Culture   Date Value Ref Range Status   04/02/2022 >100,000 CFU/mL Gram Negative Bacilli (A)  Preliminary   04/02/2022 >100,000 CFU/mL Gram Negative Bacilli (A)  Preliminary     Imaging Results (Last 24 Hours)     Procedure Component Value Units Date/Time    US Abdomen Limited [[id number]] Collected: 04/05/22 1159     Updated: 04/05/22 1202     Narrative:      EXAMINATION: US ABDOMEN LIMITED- 4/5/2022 11:59 AM CDT     HISTORY: Persistent N/V; E87.6-Hypokalemia; N39.0-Urinary tract  infection, site not specified; R31.9-Hematuria, unspecified;  R11.2-Nausea with vomiting, unspecified; E87.2-Acidosis     REPORT: Limited abdominal sonography was performed.     COMPARISON: CT abdomen pelvis with contrast 4/2/2022.     There is excessive bowel gas in the tail the pancreas and proximal aorta  are obscured. There is increased echogenicity of the liver compatible  with steatosis. The visualized aorta and IVC appear normal caliber. The  gallbladder is filled with sludge, no definite gallstones are  identified. Color wall thickness measures 3.3 mm. No pericholecystic  fluid is identified. The common hepatic duct measures 5.9 mm in  diameter, which is slightly dilated. No choledocholithiasis is  visualized. The right kidney measures 8.9 x 4.6 x 4.3 cm and appears  unremarkable. No hydronephrosis is identified. No free fluid is seen.       Impression:      1. Sludge-filled gallbladder with no significant gallbladder wall  thickening, pericholecystic fluid or convincing evidence of acute  cholecystitis. Mild dilation of the common hepatic duct with no  visualized choledocholithiasis.  2. Hepatic steatosis.        This report was finalized on 04/05/2022 12:03 by Dr. Florian Laurent MD.        I have reviewed the patient's current medications.     Assessment/Plan     Active Hospital Problems    Diagnosis    • **Intractable nausea and vomiting    • UTI (urinary tract infection), bacterial    • Sepsis secondary to UTI (HCC)    • Lactic acidosis    • Severe malnutrition (CMS/HCC)    • Hypophosphatemia    • Hypokalemia    • Malfunction of nephrostomy tube (HCC)    • Essential (primary) hypertension      Plan:  The patient was admitted on 4/3 by Dr. Ricks.  She presented with complaints of intractable nausea and vomiting.  Her medical problems started last August when she  "developed COVID-19 pneumonia and had numerous complications thereafter.  She developed an abdominal hematoma and had to go on dialysis secondary to extrinsic compression of her urinary system.  She was transferred from here to [facility] and required exploratory laparotomy.  She ended up having bilateral percutaneous nephrostomy tubes and also ended up with a left ureteral stent.  She states that her main urologist is Dr. Rina Rey.  She states that she stopped producing urine from her percutaneous nephrostomy tube recently and that there are plans for it to be removed.  She states that she had gotten in touch with [facility] to see if someone here could do it so she would not have to travel.     When she left [facility], she spent considerable time at a skilled nursing facility in [place], [place].  She states that she has only been home a few weeks.  She was admitted to [facility] in [place] on 3/9 for electrolyte abnormalities.     Renal function stable yesterday with a creatinine 0.71.  Dr. Ricks began treating a urinary tract infection with ceftriaxone.  She had a multidrug-resistant Klebsiella in October 2021.  I transitioned her to [facility] on 4/3.  Lactate down to 1.7 from 2.8 after fluids.  Urine does appear infected with too numerous to count white blood cells, 4+ bacteria and large leukocyte esterase. The sample was also bloody.  Urine culture growing gram-negative bacilli so far and still awaiting speciation and susceptibilities.  Lactic acidosis improved after fluids and antibiotics.  Procalcitonin elevated at 0.31.  Dr. Dyer is familiar with her previous problems as he saw her last year.  I consulted him to evaluate her.  He wants to treat her current infection and then have her follow back at [facility].     Blood cultures contaminated with coagulase-negative Staphylococcus.    Replace potassium aggressively. Multiple runs ordered and it is also in her fluids. \"I " "have always had potassium problems.\"  Potassium 4.4 this morning.  Recent replacement of phosphorus and potassium with IV potassium phosphate.     Dr. Ricks asked for a GI opinion.  Dr. Babin saw and recommended an abdominal ultrasound.  There is a sludge-filled gallbladder with no significant gallbladder wall thickening, pericholecystic fluid, or convincing evidence of acute cholecystitis.  Mild dilatation of the common hepatic duct with no visualized choledocholithiasis.  Consider HIDA scan tomorrow.  Reglan 5 mg every 6 hours.       She is complaining of sinus congestion and her ears have been painful.  Flonase, corticosporin eardrops.     She is on Xarelto for history of pulmonary embolus.  We will stop this medication today and placed on therapeutic Lovenox.  She may require cholecystectomy or endoscopy in the future.    Discharge Planning: I expect the patient to be discharged to home in 2-3 days.    Electronically signed by Fermin Mcclure DO, 04/05/22, 14:04 CDT.    "

## 2022-04-06 ENCOUNTER — APPOINTMENT (OUTPATIENT)
Dept: NUCLEAR MEDICINE | Facility: HOSPITAL | Age: 47
End: 2022-04-06

## 2022-04-06 PROCEDURE — 25010000002 ONDANSETRON PER 1 MG: Performed by: INTERNAL MEDICINE

## 2022-04-06 PROCEDURE — 63710000001 PROMETHAZINE PER 25 MG: Performed by: INTERNAL MEDICINE

## 2022-04-06 PROCEDURE — 25010000002 ERTAPENEM PER 500 MG: Performed by: INTERNAL MEDICINE

## 2022-04-06 PROCEDURE — 99222 1ST HOSP IP/OBS MODERATE 55: CPT | Performed by: OTOLARYNGOLOGY

## 2022-04-06 PROCEDURE — 0 TECHNETIUM TC 99M MEBROFENIN KIT: Performed by: INTERNAL MEDICINE

## 2022-04-06 PROCEDURE — 78226 HEPATOBILIARY SYSTEM IMAGING: CPT

## 2022-04-06 PROCEDURE — 25010000002 MORPHINE SULFATE (PF) 2 MG/ML SOLUTION: Performed by: INTERNAL MEDICINE

## 2022-04-06 PROCEDURE — 25010000002 METOCLOPRAMIDE PER 10 MG: Performed by: INTERNAL MEDICINE

## 2022-04-06 PROCEDURE — A9537 TC99M MEBROFENIN: HCPCS | Performed by: INTERNAL MEDICINE

## 2022-04-06 PROCEDURE — 25010000002 DIPHENHYDRAMINE PER 50 MG: Performed by: INTERNAL MEDICINE

## 2022-04-06 PROCEDURE — 25010000002 ENOXAPARIN PER 10 MG: Performed by: INTERNAL MEDICINE

## 2022-04-06 RX ORDER — MORPHINE SULFATE 2 MG/ML
1 INJECTION, SOLUTION INTRAMUSCULAR; INTRAVENOUS EVERY 4 HOURS PRN
Status: DISCONTINUED | OUTPATIENT
Start: 2022-04-06 | End: 2022-04-12 | Stop reason: RX

## 2022-04-06 RX ORDER — KIT FOR THE PREPARATION OF TECHNETIUM TC 99M MEBROFENIN 45 MG/10ML
1 INJECTION, POWDER, LYOPHILIZED, FOR SOLUTION INTRAVENOUS
Status: COMPLETED | OUTPATIENT
Start: 2022-04-06 | End: 2022-04-06

## 2022-04-06 RX ORDER — DIPHENHYDRAMINE HYDROCHLORIDE 50 MG/ML
25 INJECTION INTRAMUSCULAR; INTRAVENOUS ONCE
Status: COMPLETED | OUTPATIENT
Start: 2022-04-06 | End: 2022-04-06

## 2022-04-06 RX ADMIN — PANTOPRAZOLE SODIUM 40 MG: 40 INJECTION, POWDER, FOR SOLUTION INTRAVENOUS at 12:33

## 2022-04-06 RX ADMIN — MEBROFENIN 1 DOSE: 45 INJECTION, POWDER, LYOPHILIZED, FOR SOLUTION INTRAVENOUS at 10:05

## 2022-04-06 RX ADMIN — ENOXAPARIN SODIUM 60 MG: 60 INJECTION SUBCUTANEOUS at 12:33

## 2022-04-06 RX ADMIN — METOCLOPRAMIDE HYDROCHLORIDE 5 MG: 5 INJECTION INTRAMUSCULAR; INTRAVENOUS at 06:34

## 2022-04-06 RX ADMIN — METOCLOPRAMIDE HYDROCHLORIDE 5 MG: 5 INJECTION INTRAMUSCULAR; INTRAVENOUS at 12:33

## 2022-04-06 RX ADMIN — SODIUM BICARBONATE 650 MG: 650 TABLET ORAL at 12:33

## 2022-04-06 RX ADMIN — METOCLOPRAMIDE HYDROCHLORIDE 5 MG: 5 INJECTION INTRAMUSCULAR; INTRAVENOUS at 23:34

## 2022-04-06 RX ADMIN — PROMETHAZINE HYDROCHLORIDE 25 MG: 25 TABLET ORAL at 20:12

## 2022-04-06 RX ADMIN — Medication 10 ML: at 12:34

## 2022-04-06 RX ADMIN — FLUOXETINE HYDROCHLORIDE 40 MG: 20 CAPSULE ORAL at 12:33

## 2022-04-06 RX ADMIN — ONDANSETRON 4 MG: 2 INJECTION INTRAMUSCULAR; INTRAVENOUS at 20:50

## 2022-04-06 RX ADMIN — NEOMYCIN SULFATE, POLYMYXIN B SULFATE AND HYDROCORTISONE 4 DROP: 10; 3.5; 1 SUSPENSION/ DROPS AURICULAR (OTIC) at 12:33

## 2022-04-06 RX ADMIN — Medication 2 SPRAY: at 20:51

## 2022-04-06 RX ADMIN — DIPHENHYDRAMINE HYDROCHLORIDE 25 MG: 50 INJECTION INTRAMUSCULAR; INTRAVENOUS at 06:55

## 2022-04-06 RX ADMIN — NEOMYCIN SULFATE, POLYMYXIN B SULFATE AND HYDROCORTISONE 4 DROP: 10; 3.5; 1 SUSPENSION/ DROPS AURICULAR (OTIC) at 06:34

## 2022-04-06 RX ADMIN — ERTAPENEM SODIUM 1 G: 1 INJECTION, POWDER, LYOPHILIZED, FOR SOLUTION INTRAMUSCULAR; INTRAVENOUS at 15:39

## 2022-04-06 RX ADMIN — Medication 10 ML: at 20:20

## 2022-04-06 RX ADMIN — FLUTICASONE PROPIONATE 2 SPRAY: 50 SPRAY, METERED NASAL at 20:51

## 2022-04-06 RX ADMIN — PANTOPRAZOLE SODIUM 40 MG: 40 INJECTION, POWDER, FOR SOLUTION INTRAVENOUS at 20:30

## 2022-04-06 RX ADMIN — NEOMYCIN SULFATE, POLYMYXIN B SULFATE AND HYDROCORTISONE 4 DROP: 10; 3.5; 1 SUSPENSION/ DROPS AURICULAR (OTIC) at 17:37

## 2022-04-06 RX ADMIN — MORPHINE SULFATE 1 MG: 2 INJECTION, SOLUTION INTRAMUSCULAR; INTRAVENOUS at 16:38

## 2022-04-06 RX ADMIN — METOCLOPRAMIDE HYDROCHLORIDE 5 MG: 5 INJECTION INTRAMUSCULAR; INTRAVENOUS at 17:37

## 2022-04-06 RX ADMIN — NEOMYCIN SULFATE, POLYMYXIN B SULFATE AND HYDROCORTISONE 4 DROP: 10; 3.5; 1 SUSPENSION/ DROPS AURICULAR (OTIC) at 23:34

## 2022-04-06 NOTE — PLAN OF CARE
Goal Outcome Evaluation:  Plan of Care Reviewed With: patient        Progress: no change  Outcome Evaluation: pt continues to have nausea w no vomitting today, audiology test tomorrow w ent, also lap choley possible tomorrow orders not in yet

## 2022-04-06 NOTE — PAYOR COMM NOTE
"3/5 clinical update  [id number]   [id number]    Namita Cooper N (46 y.o. Female)             Date of Birth   1975    Social Security Number       Address   [address] [place] [address]    Home Phone   [phone]    MRN   [id number]       Yarsani   [facility]    Marital Status                               Admission Date   4/2/22    Admission Type   Emergency    Admitting Provider   Fermin Mcclure DO    Attending Provider   Fermin Mcclure DO    Department, Room/Bed   [facility] 3A, 346/1       Discharge Date       Discharge Disposition       Discharge Destination                               Attending Provider: Fermin Mcclure DO    Allergies: Coconut, Nuts, Penicillins, [place]    Isolation: None   Infection: COVID (History) (09/15/21)   Code Status: CPR   Advance Care Planning Activity    Ht: 170.2 cm (67\")   Wt: 63.4 kg (139 lb 11.2 oz)    Admission Cmt: None   Principal Problem: Intractable nausea and vomiting [R11.2]                 Active Insurance as of 4/2/2022     Primary Coverage     Payor Plan Insurance Group Employer/Plan Group    ANTHEM BLUE CROSS ANTHEM BLUE CROSS BLUE SHIELD PPO [id number]     Payor Plan Address Payor Plan Phone Number Payor Plan Fax Number Effective Dates    [address] [phone]  1/1/2022 - None Entered    [facility] [id number]       Subscriber Name Subscriber Birth Date Member ID       GRANT COOPER GEMA 1975 [id number]           Secondary Coverage     Payor Plan Insurance Group Employer/Plan Group    MEDICARE MEDICARE A & B      Payor Plan Address Payor Plan Phone Number Payor Plan Fax Number Effective Dates    [address] [phone]  7/1/2019 - None Entered    [facility] [id number]       Subscriber Name Subscriber Birth Date Member ID       NAMITA COOPER N 1975 [id number]                 Emergency Contacts      (Rel.) Home Phone Work Phone Mobile Phone    Kerry Cooperd (Spouse) -- -- " [phone]    Noel Johnson (Father) [phone] -- [phone]    Marquita Johnson (Mother) -- -- [phone]    BaronNeida valdivia -- -- [phone]              Current Facility-Administered Medications   Medication Dose Route Frequency Provider Last Rate Last Admin   • butalbital-acetaminophen-caffeine (FIORICET, ESGIC) -40 MG per tablet 2 tablet  2 tablet Oral Q4H PRN Frank Ricks MD       • droperidol (INAPSINE) injection 1.25 mg  1.25 mg Intravenous Q6H PRN rFank Ricks MD       • enoxaparin (LOVENOX) syringe 60 mg  1 mg/kg Subcutaneous Q12H Fermin Mcclure DO   60 mg at 04/05/22 1834   • ertapenem (INVanz) 1 g in sodium chloride 0.9 % 100 mL IVPB-VTB  1 g Intravenous Q24H Fermin Mcclure  mL/hr at 04/05/22 1700 1 g at 04/05/22 1700   • FLUoxetine (PROzac) capsule 40 mg  40 mg Oral Daily Frank Ricsk MD   40 mg at 04/05/22 1019   • fluticasone (FLONASE) 50 MCG/ACT nasal spray 2 spray  2 spray Each Nare BID Frank Ricks MD   2 spray at 04/05/22 2027   • methocarbamol (ROBAXIN) tablet 500 mg  500 mg Oral BID PRN Frank Ricks MD       • metoclopramide (REGLAN) injection 5 mg  5 mg Intravenous Q6H Fermin Mcclure DO   5 mg at 04/06/22 0634   • neomycin-polymyxin-hydrocortisone (CORTISPORIN) otic suspension 4 drop  4 drop Both Ears Q6H Fermin Mcclure DO   4 drop at 04/06/22 0634   • ondansetron (ZOFRAN) tablet 4 mg  4 mg Oral Q6H PRN Frank Ricks MD        Or   • ondansetron (ZOFRAN) injection 4 mg  4 mg Intravenous Q6H PRN Frank Ricks MD   4 mg at 04/05/22 1407   • oxymetazoline (AFRIN) nasal spray 2 spray  2 spray Nasal BID Frank Ricks MD   2 spray at 04/05/22 2027   • pantoprazole (PROTONIX) injection 40 mg  40 mg Intravenous Q12H Frank Ricks MD   40 mg at 04/05/22 2027   • prochlorperazine (COMPAZINE) tablet 10 mg  10 mg Oral Q8H PRN Frank Ricks MD       • promethazine (PHENERGAN) tablet 25 mg  25 mg Oral Q6H PRN Frank Ricks,  MD       • sodium bicarbonate tablet 650 mg  650 mg Oral BID Fermin Mcclure DO   650 mg at 04/05/22 2027   • sodium chloride 0.9 % flush 10 mL  10 mL Intravenous PRN Frank Ricks MD       • sodium chloride 0.9 % flush 10 mL  10 mL Intravenous PRN Frank Ricks MD       • sodium chloride 0.9 % flush 10 mL  10 mL Intravenous PRN Frank Ricks MD       • sodium chloride 0.9 % flush 10 mL  10 mL Intravenous Q12H Frank Ricks MD   10 mL at 04/05/22 2027   • sodium chloride 0.9 % flush 10 mL  10 mL Intravenous PRN Frank Ricks MD       • SUMAtriptan (IMITREX) tablet 50 mg  50 mg Oral Once Frank Ricks MD       • traZODone (DESYREL) tablet 100 mg  100 mg Oral Nightly Frank Ricks MD   100 mg at 04/05/22 2027   • zolpidem (AMBIEN) tablet 5 mg  5 mg Oral Nightly PRN Frank Ricks MD         Orders (last 48 hrs)      Start     Ordered    04/06/22 0745  diphenhydrAMINE (BENADRYL) injection 25 mg  Once         04/06/22 0652    04/06/22 0001  NPO Diet  Diet Effective Midnight         04/05/22 1423    04/06/22 0000  NM HIDA SCAN WITH PHARMACOLOGICAL INTERVENTION  1 Time Imaging         04/05/22 1423    04/05/22 1800  enoxaparin (LOVENOX) syringe 60 mg  Every 12 Hours         04/05/22 1420    04/05/22 1130  sodium bicarbonate tablet 650 mg  2 Times Daily         04/05/22 1037    04/05/22 1130  Potassium Phosphates 30 mmol in sodium chloride 0.9 % 500 mL infusion  Once         04/05/22 1038    04/05/22 1027  Diet Clear Liquid  Diet Effective Now,   Status:  Canceled         04/05/22 1026    04/05/22 1003  US Abdomen Limited  1 Time Imaging         04/04/22 1204    04/05/22 0600  Phosphorus  Morning Draw         04/04/22 1438    04/05/22 0600  CBC (No Diff)  Morning Draw         04/04/22 1438    04/05/22 0600  Basic Metabolic Panel  Morning Draw         04/04/22 1438    04/05/22 0001  NPO Diet  Diet Effective Midnight,   Status:  Canceled        Comments: For US abdomen in the a.m.     04/04/22 1317    04/04/22 1700  metoclopramide (REGLAN) injection 5 mg  Every 6 Hours         04/04/22 1208    04/04/22 1530  Potassium Phosphates 30 mmol in sodium chloride 0.9 % 500 mL infusion  Once         04/04/22 1438    04/04/22 1247  Inpatient Admission  Once         04/04/22 1246    04/04/22 1206  Inpatient Urology Consult  Once        Specialty:  Urology  Provider:  Junior Dyer MD    04/04/22 1206    04/04/22 1205  US Abdomen Complete  1 Time Imaging,   Status:  Canceled         04/04/22 1204    04/03/22 2100  rivaroxaban (XARELTO) tablet 20 mg  Nightly,   Status:  Discontinued         04/03/22 0411 04/03/22 2100  traZODone (DESYREL) tablet 100 mg  Nightly         04/03/22 0411    04/03/22 1530  ertapenem (INVanz) 1 g in sodium chloride 0.9 % 100 mL IVPB-VTB  Every 24 Hours         04/03/22 1444    04/03/22 1445  neomycin-polymyxin-hydrocortisone (CORTISPORIN) otic suspension 4 drop  Every 6 Hours Scheduled         04/03/22 1356    04/03/22 0900  FLUoxetine (PROzac) capsule 40 mg  Daily         04/03/22 0411    04/03/22 0900  sodium chloride 0.9 % flush 10 mL  Every 12 Hours Scheduled         04/03/22 0411    04/03/22 0900  fluticasone (FLONASE) 50 MCG/ACT nasal spray 2 spray  2 Times Daily         04/03/22 0435    04/03/22 0800  Vital Signs  Every 4 Hours       04/03/22 0411    04/03/22 0600  Incentive Spirometry  Every 4 Hours While Awake       04/03/22 0411    04/03/22 0530  pantoprazole (PROTONIX) injection 40 mg  Every 12 Hours Scheduled         04/03/22 0435    04/03/22 0530  oxymetazoline (AFRIN) nasal spray 2 spray  2 Times Daily         04/03/22 0435    04/03/22 0500  SUMAtriptan (IMITREX) tablet 50 mg  Once         04/03/22 0411    04/03/22 0500  sodium chloride 0.9 % with KCl 20 mEq/L infusion  Continuous,   Status:  Discontinued         04/03/22 0411 04/03/22 0500  metoclopramide (REGLAN) injection 10 mg  Every 6 Hours,   Status:  Discontinued         04/03/22 0411 04/03/22  "0409  droperidol (INAPSINE) injection 1.25 mg  Every 6 Hours PRN         04/03/22 0411    04/03/22 0409  ondansetron (ZOFRAN) tablet 4 mg  Every 6 Hours PRN        \"Or\" Linked Group Details    04/03/22 0411    04/03/22 0409  ondansetron (ZOFRAN) injection 4 mg  Every 6 Hours PRN        \"Or\" Linked Group Details    04/03/22 0411    04/03/22 0406  Intake & Output  Every Shift       04/03/22 0411    04/03/22 0405  sodium chloride 0.9 % flush 10 mL  As Needed         04/03/22 0411    04/03/22 0404  promethazine (PHENERGAN) tablet 25 mg  Every 6 Hours PRN         04/03/22 0411    04/03/22 0404  prochlorperazine (COMPAZINE) tablet 10 mg  Every 8 Hours PRN         04/03/22 0411    04/03/22 0403  methocarbamol (ROBAXIN) tablet 500 mg  2 Times Daily PRN         04/03/22 0411    04/03/22 0403  zolpidem (AMBIEN) tablet 5 mg  Nightly PRN         04/03/22 0411    04/03/22 0403  butalbital-acetaminophen-caffeine (FIORICET, ESGIC) -40 MG per tablet 2 tablet  Every 4 Hours PRN         04/03/22 0411 04/02/22 2122  sodium chloride 0.9 % flush 10 mL  As Needed        \"And\" Linked Group Details    04/02/22 2123 04/02/22 2110  sodium chloride 0.9 % flush 10 mL  As Needed        \"And\" Linked Group Details    04/02/22 2110 04/02/22 2053  sodium chloride 0.9 % flush 10 mL  As Needed         04/02/22 2053    --  metoclopramide (REGLAN) 5 MG/5ML solution  3 Times Daily Before Meals         04/03/22 0226    --  rizatriptan (MAXALT) 10 MG tablet  Once As Needed         04/03/22 0226    --  butalbital-acetaminophen-caffeine (FIORICET, ESGIC) -40 MG per tablet  Every 4 Hours PRN         04/03/22 0226    --  ondansetron ODT (ZOFRAN-ODT) 4 MG disintegrating tablet  4 Times Daily PRN         04/03/22 0226    --  eszopiclone (LUNESTA) 3 MG tablet  Nightly         04/03/22 0226    --  traZODone (DESYREL) 100 MG tablet  Nightly         04/03/22 0226    --  promethazine (PHENERGAN) 25 MG tablet  Every 6 Hours PRN         04/03/22 " 0226    --  methocarbamol (ROBAXIN) 500 MG tablet  3 Times Daily PRN         04/03/22 0226    --  prochlorperazine (COMPAZINE) 10 MG tablet  Every 8 Hours PRN         04/03/22 0226    --  FLUoxetine (PROzac) 40 MG capsule  Daily         04/03/22 0226    --  rivaroxaban (XARELTO) 20 MG tablet  Nightly         04/03/22 0226    --  oxymetazoline (AFRIN) 0.05 % nasal spray  2 Times Daily         04/03/22 0226    --  SCANNED - TELEMETRY           04/02/22 0000    --  Calcium Carbonate-Simethicone 750-80 MG chewable tablet  Daily         04/03/22 1548    --  pantoprazole (PROTONIX) 20 MG EC tablet  Daily         04/03/22 1602                 Physician Progress Notes (last 48 hours)      Fermin Mcclure,  at 04/05/22 1404              [facility] Medicine Services  INPATIENT PROGRESS NOTE    Patient Name: Namita Zabala  Date of Admission: 4/2/2022  Today's Date: 04/05/22  Length of Stay: 1  Primary Care Physician: Daivd Lara MD    Subjective   Chief Complaint: Nausea.   HPI   Urology with no specific plans.  Patient will need to follow-up at [place] after treatment of her urinary tract infection.    Ultrasound of the abdomen shows a sludge-filled gallbladder, but no other convincing signs of cholecystitis.    Still having problems with nausea, but not much vomiting.    Review of Systems  All pertinent negatives and positives are as above. All other systems have been reviewed and are negative unless otherwise stated.     Objective    Temp:  [97.4 °F (36.3 °C)-98.2 °F (36.8 °C)] 97.6 °F (36.4 °C)  Heart Rate:  [] 107  Resp:  [16] 16  BP: ()/(63-84) 102/74  Physical Exam  Constitutional:       Appearance: She is well-developed.      Comments: Up in bed.  No distress.  No family present.  Discussed with her nurse, Sammi.   HENT:      Head: Normocephalic and atraumatic.   Eyes:      Conjunctiva/sclera: Conjunctivae normal.      Pupils: Pupils are equal, round,  and reactive to light.   Neck:      Vascular: No JVD.   Cardiovascular:      Rate and Rhythm: Normal rate and regular rhythm.      Heart sounds: Normal heart sounds. No murmur heard.    No friction rub. No gallop.   Pulmonary:      Effort: Pulmonary effort is normal. No respiratory distress.      Breath sounds: Normal breath sounds. No wheezing or rales.   Chest:      Chest wall: No tenderness.   Abdominal:      General: Bowel sounds are normal. There is no distension.      Palpations: Abdomen is soft.      Tenderness: There is no abdominal tenderness.   Musculoskeletal:         General: No tenderness or deformity. Normal range of motion.      Cervical back: Neck supple.   Skin:     General: Skin is warm and dry.      Findings: No rash.   Neurological:      General: No focal deficit present.      Mental Status: She is alert and oriented to person, place, and time.      Cranial Nerves: No cranial nerve deficit.      Motor: Weakness present. No abnormal muscle tone.      Deep Tendon Reflexes: Reflexes normal.   Psychiatric:      Comments: Flat affect, but conversational.       Results Review:  I have reviewed the labs, radiology results, and diagnostic studies.    Laboratory Data:   Results from last 7 days   Lab Units 04/05/22  0749 04/04/22  1328 04/03/22  0713   WBC 10*3/mm3 7.64 7.29 10.11   HEMOGLOBIN g/dL 9.6* 9.6* 11.4*   HEMATOCRIT % 30.1* 29.2* 32.8*   PLATELETS 10*3/mm3 303 293 325     Results from last 7 days   Lab Units 04/05/22  0748 04/04/22  1328 04/03/22  1824 04/03/22  0713 04/02/22  2113   SODIUM mmol/L 141 141  --  137 137   POTASSIUM mmol/L 4.4 3.7 3.5 2.5* 2.6*   CHLORIDE mmol/L 112* 109*  --  97* 90*   CO2 mmol/L 18.0* 18.0*  --  25.0 27.0   BUN mg/dL 7 8  --  13 13   CREATININE mg/dL 0.71 0.90  --  0.95 1.11*   CALCIUM mg/dL 7.4* 7.8*  --  8.7 10.6*   BILIRUBIN mg/dL  --   --   --  0.6 1.2   ALK PHOS U/L  --   --   --  100 149*   ALT (SGPT) U/L  --   --   --  13 20   AST (SGOT) U/L  --   --   --   18 28   GLUCOSE mg/dL 89 115*  --  100* 97     Results from last 7 days   Lab Units 04/05/22  0748 04/04/22  1328 04/02/22  2113   MAGNESIUM mg/dL  --  2.0 1.7   PHOSPHORUS mg/dL 2.2* 1.2*  --      Culture Data:   Blood Culture   Date Value Ref Range Status   04/02/2022 No growth at 2 days  Preliminary   04/02/2022 Staphylococcus, coagulase negative (C)  Final     Urine Culture   Date Value Ref Range Status   04/02/2022 >100,000 CFU/mL Gram Negative Bacilli (A)  Preliminary   04/02/2022 >100,000 CFU/mL Gram Negative Bacilli (A)  Preliminary     Imaging Results (Last 24 Hours)     Procedure Component Value Units Date/Time    US Abdomen Limited [[id number]] Collected: 04/05/22 1159     Updated: 04/05/22 1206    Narrative:      EXAMINATION: US ABDOMEN LIMITED- 4/5/2022 11:59 AM CDT     HISTORY: Persistent N/V; E87.6-Hypokalemia; N39.0-Urinary tract  infection, site not specified; R31.9-Hematuria, unspecified;  R11.2-Nausea with vomiting, unspecified; E87.2-Acidosis     REPORT: Limited abdominal sonography was performed.     COMPARISON: CT abdomen pelvis with contrast 4/2/2022.     There is excessive bowel gas in the tail the pancreas and proximal aorta  are obscured. There is increased echogenicity of the liver compatible  with steatosis. The visualized aorta and IVC appear normal caliber. The  gallbladder is filled with sludge, no definite gallstones are  identified. Color wall thickness measures 3.3 mm. No pericholecystic  fluid is identified. The common hepatic duct measures 5.9 mm in  diameter, which is slightly dilated. No choledocholithiasis is  visualized. The right kidney measures 8.9 x 4.6 x 4.3 cm and appears  unremarkable. No hydronephrosis is identified. No free fluid is seen.       Impression:      1. Sludge-filled gallbladder with no significant gallbladder wall  thickening, pericholecystic fluid or convincing evidence of acute  cholecystitis. Mild dilation of the common hepatic duct with no  visualized  choledocholithiasis.  2. Hepatic steatosis.        This report was finalized on 04/05/2022 12:03 by Dr. Florian Laurent MD.        I have reviewed the patient's current medications.     Assessment/Plan     Active Hospital Problems    Diagnosis    • **Intractable nausea and vomiting    • UTI (urinary tract infection), bacterial    • Sepsis secondary to UTI (HCC)    • Lactic acidosis    • Severe malnutrition (CMS/HCC)    • Hypophosphatemia    • Hypokalemia    • Malfunction of nephrostomy tube (HCC)    • Essential (primary) hypertension      Plan:  The patient was admitted on 4/3 by Dr. Ricks.  She presented with complaints of intractable nausea and vomiting.  Her medical problems started last August when she developed COVID-19 pneumonia and had numerous complications thereafter.  She developed an abdominal hematoma and had to go on dialysis secondary to extrinsic compression of her urinary system.  She was transferred from here to [facility] and required exploratory laparotomy.  She ended up having bilateral percutaneous nephrostomy tubes and also ended up with a left ureteral stent.  She states that her main urologist is Dr. Rina Rey.  She states that she stopped producing urine from her percutaneous nephrostomy tube recently and that there are plans for it to be removed.  She states that she had gotten in touch with [facility] to see if someone here could do it so she would not have to travel.     When she left [facility], she spent considerable time at a skilled nursing facility in [place], [place].  She states that she has only been home a few weeks.  She was admitted to [facility] in [place] on 3/9 for electrolyte abnormalities.     Renal function stable yesterday with a creatinine 0.71.  Dr. Ricks began treating a urinary tract infection with ceftriaxone.  She had a multidrug-resistant Klebsiella in October 2021.  I transitioned her to [facility] on 4/3.  Lactate down to 1.7 from  "2.8 after fluids.  Urine does appear infected with too numerous to count white blood cells, 4+ bacteria and large leukocyte esterase. The sample was also bloody.  Urine culture growing gram-negative bacilli so far and still awaiting speciation and susceptibilities.  Lactic acidosis improved after fluids and antibiotics.  Procalcitonin elevated at 0.31.  Dr. Dyer is familiar with her previous problems as he saw her last year.  I consulted him to evaluate her.  He wants to treat her current infection and then have her follow back at [place].     Blood cultures contaminated with coagulase-negative Staphylococcus.    Replace potassium aggressively. Multiple runs ordered and it is also in her fluids. \"I have always had potassium problems.\"  Potassium 4.4 this morning.  Recent replacement of phosphorus and potassium with IV potassium phosphate.     Dr. Ricks asked for a GI opinion.  Dr. Babin saw and recommended an abdominal ultrasound.  There is a sludge-filled gallbladder with no significant gallbladder wall thickening, pericholecystic fluid, or convincing evidence of acute cholecystitis.  Mild dilatation of the common hepatic duct with no visualized choledocholithiasis.  Consider HIDA scan tomorrow.  Reglan 5 mg every 6 hours.       She is complaining of sinus congestion and her ears have been painful.  Flonase, corticosporin eardrops.     She is on Xarelto for history of pulmonary embolus.  We will stop this medication today and placed on therapeutic Lovenox.  She may require cholecystectomy or endoscopy in the future.    Discharge Planning: I expect the patient to be discharged to home in 2-3 days.    Electronically signed by Fermin Mcclure DO, 04/05/22, 14:04 CDT.      Electronically signed by Fermin Mcclure DO at 04/05/22 1432     Fermin Mcclure DO at 04/04/22 1203              [facility] Medicine Services  INPATIENT PROGRESS NOTE    Patient Name: Namita ROBERTSON" Sagrario  Date of Admission: 4/2/2022  Today's Date: 04/04/22  Length of Stay: 0  Primary Care Physician: David Lara MD    Subjective   Chief Complaint: Nausea.   HPI   Still very nauseous, but no recent vomiting.    Still awaiting her morning lab draw. Sammi called down and they will be coming up to do it.     GI recommends abdominal ultrasound and possible HIDA scan.  Placed on Reglan.    We will get an opinion from urology today about her percutaneous nephrostomy tube and ureteral stent in the face of recurrent urinary tract infection.    Still complaining about her ears feeling full.  I obtained an otoscope today and did not see anything significant on exam.    Review of Systems   All pertinent negatives and positives are as above. All other systems have been reviewed and are negative unless otherwise stated.     Objective    Temp:  [97.5 °F (36.4 °C)-98.4 °F (36.9 °C)] 98 °F (36.7 °C)  Heart Rate:  [] 102  Resp:  [16-18] 18  BP: (104-123)/(68-84) 114/73  Physical Exam  Constitutional:       Appearance: She is well-developed.      Comments: Up in bed.  No distress.  No family present.  Discussed with her nurse, Sammi.   HENT:      Head: Normocephalic and atraumatic.      Right Ear: Tympanic membrane, ear canal and external ear normal. There is no impacted cerumen.      Left Ear: Tympanic membrane, ear canal and external ear normal. There is no impacted cerumen.   Eyes:      Conjunctiva/sclera: Conjunctivae normal.      Pupils: Pupils are equal, round, and reactive to light.   Neck:      Vascular: No JVD.   Cardiovascular:      Rate and Rhythm: Normal rate and regular rhythm.      Heart sounds: Normal heart sounds. No murmur heard.    No friction rub. No gallop.   Pulmonary:      Effort: Pulmonary effort is normal. No respiratory distress.      Breath sounds: Normal breath sounds. No wheezing or rales.   Chest:      Chest wall: No tenderness.   Abdominal:      General: Bowel sounds are normal.  There is no distension.      Palpations: Abdomen is soft.      Tenderness: There is no abdominal tenderness.   Musculoskeletal:         General: No tenderness or deformity. Normal range of motion.      Cervical back: Neck supple.   Skin:     General: Skin is warm and dry.      Findings: No rash.   Neurological:      General: No focal deficit present.      Mental Status: She is alert and oriented to person, place, and time.      Cranial Nerves: No cranial nerve deficit.      Motor: Weakness present. No abnormal muscle tone.      Deep Tendon Reflexes: Reflexes normal.   Psychiatric:      Comments: Flat affect, but conversational.       Results Review:  I have reviewed the labs, radiology results, and diagnostic studies.    Laboratory Data:   Results from last 7 days   Lab Units 04/03/22  0713 04/02/22  2113   WBC 10*3/mm3 10.11 9.07   HEMOGLOBIN g/dL 11.4* 14.4   HEMATOCRIT % 32.8* 42.3   PLATELETS 10*3/mm3 325 476*     Results from last 7 days   Lab Units 04/03/22  1824 04/03/22  0713 04/02/22  2113   SODIUM mmol/L  --  137 137   POTASSIUM mmol/L 3.5 2.5* 2.6*   CHLORIDE mmol/L  --  97* 90*   CO2 mmol/L  --  25.0 27.0   BUN mg/dL  --  13 13   CREATININE mg/dL  --  0.95 1.11*   CALCIUM mg/dL  --  8.7 10.6*   BILIRUBIN mg/dL  --  0.6 1.2   ALK PHOS U/L  --  100 149*   ALT (SGPT) U/L  --  13 20   AST (SGOT) U/L  --  18 28   GLUCOSE mg/dL  --  100* 97     Culture Data:   Blood Culture   Date Value Ref Range Status   04/02/2022 No growth at 24 hours  Preliminary   04/02/2022 Abnormal Stain (C)  Preliminary     Urine Culture   Date Value Ref Range Status   04/02/2022 Scant growth (1+) Gram Negative Bacilli (A)  Preliminary     I have reviewed the patient's current medications.     Assessment/Plan     Active Hospital Problems    Diagnosis    • **Intractable nausea and vomiting    • UTI (urinary tract infection), bacterial    • Sepsis secondary to UTI (HCC)    • Lactic acidosis    • Hypokalemia    • Malfunction of  nephrostomy tube (HCC)    • Essential (primary) hypertension      Plan:  The patient was admitted on 4/3 by Dr. Ricks.  She presented with complaints of intractable nausea and vomiting.  Her medical problems started last August when she developed COVID-19 pneumonia and had numerous complications thereafter.  She developed an abdominal hematoma and had to go on dialysis secondary to extrinsic compression of her urinary system.  She was transferred from here to [facility] and required exploratory laparotomy.  She ended up having bilateral percutaneous nephrostomy tubes and also ended up with a left ureteral stent.  She states that her main urologist is Dr. Rina Rey.  She states that she stopped producing urine from her percutaneous nephrostomy tube recently and that there are plans for it to be removed.  She states that she had gotten in touch with [place] to see if someone here could do it so she would not have to travel.     When she left [place], she spent considerable time at a skilled nursing facility in [place], [place].  She states that she has only been home a few weeks.  She was admitted to [facility] in [place] on 3/9 for electrolyte abnormalities.     Renal function stable yesterday with a creatinine 0.95.  Dr. Ricks began treating a urinary tract infection with ceftriaxone.  She had a multidrug-resistant Klebsiella in October 2021.  I transitioned her to [facility] on 4/3. Down to 1.7 from 2.8 after fluids.  Urine does appear infected with too numerous to count white blood cells, 4+ bacteria and large leukocyte esterase.  The sample was also bloody.  Urine culture growing gram-negative bacilli so far.  Lactic acidosis improved after fluids and antibiotics.   Procalcitonin elevated at 0.31.  I would like for her to be evaluated by urology.  Dr. Dyer is familiar with her previous problems as he saw her last year.     Blood cultures are likely going to be a  "contaminant with coagulase-negative Staphylococcus.  Follow to completion.    Replace potassium aggressively. Multiple runs ordered and it is also in her fluids. \"I have always had potassium problems.\"  Potassium 3.5 last night.  For some reason, morning labs have not been drawn.     Dr. Ricks asked for a GI opinion.  Dr. Babin saw and recommended an abdominal ultrasound and perhaps a HIDA scan thereafter.  Reglan 5 mg every 6 hours.  No significant plans for endoscopy at this time. Her nausea and vomiting could simply be related to her untreated urinary issues.     She is complaining of sinus congestion and her ears have been painful.  Flonase has not helped.  Corticosporin eardrops.     DVT prophylaxis is achieved by virtue of being on Xarelto.    Discharge Planning: I expect the patient to be discharged to home in 2-3 days.    Electronically signed by Fermin Mcclure DO, 04/04/22, 12:03 CDT.      Electronically signed by Fermin Mcclure DO at 04/04/22 1221          Consult Notes (last 48 hours)      Junior Dyer MD at 04/05/22 0728      Consult Orders    1. 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 Zabala is a pleasant 46 year old female with a complex, protracted and unfortunate clinical course.    She was originally admitted to [facility] in August of 2021 for COVID with pneumonia. She was found to have a PE and started on therapy. She developed a severe pelvic hematoma with severe displacement of the urinary bladder with mass effect. The decision was made to observe for resolution of hematoma. She " "developed renal failure that was likely multifactorial including an element of obstruction from mass effect and displacement of the bladder. At that time, ureteral stents were not an option given the displacement of the bladder and percutaneous tubes were not indicated by radiology given the minimal upper tract dilation. She was eventually discharged to SNF.    She presented again to [facility] just a few days ago with intractable nausea and emesis. She was admitted to the hospitalist service.    In the interim between her initial admission here last year and this current admission, there appears to have been significant clinical changes in her course. Per review of Care Everywhere, she developed a necrotizing infection, secondary to superinfection of the hematoma, involving the the urinary bladder and distal left ureter. She underwent surgery at [facility] and her care has been managed there since. She underwent exploratory laparotomy on 10/16/2021 and had numerous returns to OR for debridement and wash out. [facility] was involved for complex bladder repair with multiple defects. She had a persistent leak. She underwent bilateral percutaneous nephrostomy tube placement with IR on 10/20/202 to divert urine given persistent JAREK output. She has been cared for by [facility] since that time.    Pertinent recent notes:    01/19/2022 - R PCN replaced with PCNU and L PCN replaced with PCNU with [facility]. Persistent left ureteral leak noted on antegrade urography.     02/10/2022 - R PCNU removed in [facility] clinic. Plan in that note is to schedule:     \"Evaluation in the OR with retrograde and ureteroscopy (with possible stricture dilation/incision at the time if no extrav and stricture present). If extrav is present, we could internalize her to a JJ stent as well.\"    There is concern for ureteral stricture that may require URS +/- biopsy and possible dilation.    Review of " Systems  Pertinent items are noted in HPI, all other systems reviewed and negative     History  Past Medical History:   Diagnosis Date   • Angina at rest (HCC)    • Migraine     Sees Pain Management for injections.    • MVP (mitral valve prolapse)    • Renal disorder    • Syncope        Past Surgical History:   Procedure Laterality Date   • BREAST BIOPSY Right 2011    benign   • INSERTION HEMODIALYSIS CATHETER Left 9/16/2021    Procedure: HEMODIALYSIS CATHETER INSERTION;  Surgeon: Anders Kaur MD;  Location: [place] [address];  Service: Vascular;  Laterality: Left;   • TONSILLECTOMY         Family History   Problem Relation Age of Onset   • Colon cancer Maternal Aunt 52   • Fibromyalgia Mother    • Heart disease Mother    • Hypertension Mother    • Hodgkin's lymphoma Paternal Grandmother    • Ovarian cancer Neg Hx    • Breast cancer Neg Hx        Social History     Socioeconomic History   • Marital status:    Tobacco Use   • Smoking status: Never Smoker   • Smokeless tobacco: Never Used   Substance and Sexual Activity   • Alcohol use: No   • Drug use: No       Current Facility-Administered Medications   Medication Dose Route Frequency Provider Last Rate Last Admin   • butalbital-acetaminophen-caffeine (FIORICET, ESGIC) -40 MG per tablet 2 tablet  2 tablet Oral Q4H PRN Frank Ricks MD       • droperidol (INAPSINE) injection 1.25 mg  1.25 mg Intravenous Q6H PRN Frank Ricks MD       • ertapenem (INVanz) 1 g in sodium chloride 0.9 % 100 mL IVPB-VTB  1 g Intravenous Q24H Fermin Mcclure  mL/hr at 04/04/22 1447 1 g at 04/04/22 1447   • FLUoxetine (PROzac) capsule 40 mg  40 mg Oral Daily Frank Ricks MD   40 mg at 04/04/22 0921   • fluticasone (FLONASE) 50 MCG/ACT nasal spray 2 spray  2 spray Each Nare BID Frank Ricks MD   2 spray at 04/04/22 2109   • methocarbamol (ROBAXIN) tablet 500 mg  500 mg Oral BID PRN Frank Ricks MD       • metoclopramide (REGLAN)  injection 5 mg  5 mg Intravenous Q6H Fermin Mcclure DO   5 mg at 04/05/22 0547   • neomycin-polymyxin-hydrocortisone (CORTISPORIN) otic suspension 4 drop  4 drop Both Ears Q6H Fermin Mcclure DO   4 drop at 04/05/22 0547   • ondansetron (ZOFRAN) tablet 4 mg  4 mg Oral Q6H PRN Frank Rciks MD        Or   • ondansetron (ZOFRAN) injection 4 mg  4 mg Intravenous Q6H PRN Frank Ricks MD   4 mg at 04/04/22 2120   • oxymetazoline (AFRIN) nasal spray 2 spray  2 spray Nasal BID Frank Ricks MD   2 spray at 04/04/22 2109   • pantoprazole (PROTONIX) injection 40 mg  40 mg Intravenous Q12H Frank Ricks MD   40 mg at 04/04/22 2108   • prochlorperazine (COMPAZINE) tablet 10 mg  10 mg Oral Q8H PRN Frank Ricks MD       • promethazine (PHENERGAN) tablet 25 mg  25 mg Oral Q6H PRN Frank Ricks MD       • rivaroxaban (XARELTO) tablet 20 mg  20 mg Oral Nightly Frank Ricks MD   20 mg at 04/04/22 2110   • sodium chloride 0.9 % flush 10 mL  10 mL Intravenous PRN Frank Ricks MD       • sodium chloride 0.9 % flush 10 mL  10 mL Intravenous PRN Frank Ricks MD       • sodium chloride 0.9 % flush 10 mL  10 mL Intravenous PRN Frank Ricks MD       • sodium chloride 0.9 % flush 10 mL  10 mL Intravenous Q12H Frank Ricks MD   10 mL at 04/04/22 2108   • sodium chloride 0.9 % flush 10 mL  10 mL Intravenous PRN Frank Ricks MD       • sodium chloride 0.9 % with KCl 20 mEq/L infusion  100 mL/hr Intravenous Continuous Frank Ricks  mL/hr at 04/05/22 0129 100 mL/hr at 04/05/22 0129   • SUMAtriptan (IMITREX) tablet 50 mg  50 mg Oral Once Frank Ricks MD       • traZODone (DESYREL) tablet 100 mg  100 mg Oral Nightly Frank Ricks MD   100 mg at 04/04/22 2108   • zolpidem (AMBIEN) tablet 5 mg  5 mg Oral Nightly PRN Frank Ricks MD            Allergies   Allergen Reactions   • Coconut Unknown - High Severity   • Nuts Unknown - High Severity   •  Penicillins    • [place] Other (See Comments)     Causes migraines per pt reports       Objective     Vital Signs   Temp:  [97.4 °F (36.3 °C)-98.2 °F (36.8 °C)] 97.7 °F (36.5 °C)  Heart Rate:  [] 95  Resp:  [16-18] 16  BP: ()/(63-84) 97/63    Intake/Output Summary (Last 24 hours) at 4/5/2022 0728  Last data filed at 4/5/2022 0700  Gross per 24 hour   Intake 800 ml   Output 1550 ml   Net -750 ml       Physical Exam:    General: Somnolent, cooperative, nontoxic, comfortable  Head:  Normocephalic, without obvious abnormality, atraumatic  Eyes:   Lids and lashes normal, no icterus, no pallor  Ears:  Ears appear intact with no abnormalities noted  Neck:  Supple  Back:  No costovertebral angle tenderness on left  Lungs: Unlabored respirations  Heart:  Regular rhythm and normal rate  Abdomen: Soft, nontender, nondistended  :  Left PCNU tube capped. Entrance appears clean and dry with no signs of infection.  Extremities: Moves all extremities well but movement is slow overall  Skin:  Pale but Warm and dry  Neurologic: Cranial nerves 2 - 12 grossly intact    Data Review:    Urine Culture - Urine, Urine, Catheter (04/02/2022 22:21)   Basic Metabolic Panel (04/04/2022 13:28)   CT Abdomen Pelvis With Contrast (04/02/2022 22:14)       Assessment and Plan:    Complex urological history including pelvic hematoma that developed superinfection resulting in necrotizing bladder infection with complex bladder repair and persistent bladder/ureteral leak with indwelling left nephroureteral stent: This patient will need to continue care with [facility]. Per review of Care Everywhere, their plan was to take her for surgery for cystoscopy, possible ureteroscopy, possible internalization of left ureteral stent. Given the complexity of her clinical course, we would be unable to provide the level of care to assume urologic care of this patient. I would recommend contacting [facility] to alert them of her inpatient  status to determine if transfer to their facility is appropriate versus having her follow up as an outpatient with them. Please let me know how I may assist.    Given her complex clinical course, I would recommend continuing empiric antibiotics covering urinary species for now until [facility] is made aware.    UROLOGICAL DISPOSITION: As above. No urologic intervention here but continued complex urologic care is paramount to her care.     I discussed the patient's findings and my recommendations with patient and nursing staff    (Please note that portions of this note were completed with a voice recognition program.)    Junior Dyer MD  04/05/22  07:28 CDT    Time: Time spent: 45 minutes spent performing evaluation and management, chart review, and discussion with patient, > 50% of time spent in face-to-face encounter      Electronically signed by Junior Dyer MD at 04/05/22 9253

## 2022-04-06 NOTE — PLAN OF CARE
Goal Outcome Evaluation:  Plan of Care Reviewed With: patient        Progress: no change  Outcome Evaluation: VSS. no change in neuro status this shift. Pt is resting most of shift, denies pain. Assist w/repositioning. Noted to have rash this am, denies itching or pain, MD notified, orders obtained. Safety maintained.

## 2022-04-06 NOTE — PROGRESS NOTES
[facility] Medicine Services  INPATIENT PROGRESS NOTE    Patient Name: Namita Zabala  Date of Admission: 4/2/2022  Today's Date: 04/06/22  Length of Stay: 2  Primary Care Physician: David Laar MD    Subjective   Chief Complaint: Persistent nausea.   HPI  Consulting general surgery today given the fact that her gallbladder is sludge-filled and HIDA scan also shows an EF of 20%.  She is persistently nauseous despite the treatment of her UTI.  They would likely want to wait until tomorrow to conduct an intervention because I just transitioned her to therapeutic Lovenox yesterday.  Her last dose of Xarelto was on 4/4.    Gave her a break on labs today.    Still complaining of bilateral ear pain and now saying her hearing is affected. My recent otoscopic examination was normal.     I tried to call her  Grant at [phone] to update him on her case and [facility]     Review of Systems  All pertinent negatives and positives are as above. All other systems have been reviewed and are negative unless otherwise stated.     Objective    Temp:  [97.9 °F (36.6 °C)-98.4 °F (36.9 °C)] 98 °F (36.7 °C)  Heart Rate:  [106-111] 111  Resp:  [16-20] 18  BP: (112-136)/(80-98) 112/84  Physical Exam  Constitutional:       Appearance: She is well-developed.      Comments: Up in bed.  No distress.  No family present.  Discussed with her nurse, Bora.   HENT:      Head: Normocephalic and atraumatic.   Eyes:      Conjunctiva/sclera: Conjunctivae normal.      Pupils: Pupils are equal, round, and reactive to light.   Neck:      Vascular: No JVD.   Cardiovascular:      Rate and Rhythm: Normal rate and regular rhythm.      Heart sounds: Normal heart sounds. No murmur heard.    No friction rub. No gallop.   Pulmonary:      Effort: Pulmonary effort is normal. No respiratory distress.      Breath sounds: Normal breath sounds. No wheezing or rales.   Chest:      Chest wall: No  tenderness.   Abdominal:      General: Bowel sounds are normal. There is no distension.      Palpations: Abdomen is soft.      Tenderness: There is no abdominal tenderness.   Musculoskeletal:         General: No tenderness or deformity. Normal range of motion.      Cervical back: Neck supple.   Skin:     General: Skin is warm and dry.      Findings: No rash.   Neurological:      General: No focal deficit present.      Mental Status: She is alert and oriented to person, place, and time.      Cranial Nerves: No cranial nerve deficit.      Motor: Weakness present. No abnormal muscle tone.      Deep Tendon Reflexes: Reflexes normal.   Psychiatric:      Comments: Flat affect, but conversational.       Results Review:  I have reviewed the labs, radiology results, and diagnostic studies.    Laboratory Data:   No new labs.    Culture Data:   Blood Culture   Date Value Ref Range Status   04/02/2022 No growth at 3 days  Preliminary   04/02/2022 Staphylococcus, coagulase negative (C)  Final     Urine Culture   Date Value Ref Range Status   04/02/2022 >100,000 CFU/mL Gram Negative Bacilli (A)  Preliminary   04/02/2022 >100,000 CFU/mL Proteus mirabilis (A)  Preliminary     Imaging Results (Last 24 Hours)     Procedure Component Value Units Date/Time    NM HIDA SCAN WITHOUT PHARMACOLOGICAL INTERVENTION [[id number]] Collected: 04/06/22 1313     Updated: 04/06/22 1318    Narrative:      EXAMINATION:   NM HIDA SCAN WITHOUT PHARMACOLOGICAL INTERVENTION-   4/6/2022 1:13 PM CDT     HISTORY: Sludge in the gallbladder NM HIDA SCAN WITHOUT PHARMACOLOGICAL  INTERVENTION- 4/6/2022 10:16 AM CDT     Clinical History: Persistent nausea and vomiting, sludge-filled  gallbladder; E87.6-Hypokalemia; N39.0-Urinary tract infection, site not  specified; R31.9-Hematuria, unspecified; R11.2-Nausea with vomiting,  unspecified; E87.2-Acidosis     Comparison: None.      Radiopharmaceutical: 4.3 mCi technetium 99m Mebrofenin IV.      Technique: Anterior  images were acquired over the upper abdomen for a  period of 60 minutes following intravenous administration of the  radiopharmaceutical.      Subsequently, the patient ingested a 1 ounce mixture of corn oil  emulsion, and additional images of the abdomen were obtained for 60  minutes. Gallbladder ejection fraction was calculated.      Findings:    There is prompt progression of the isotope from the liver into the  intrahepatic biliary ducts, common bile duct and gallbladder.  Radiotracer is noted in the small bowel at the end of 60 minutes.      Following ingestion of the corn oil emulsion, calculated gallbladder  ejection fraction is 20% (normal > 20%). There is further radiotracer  accumulation in the small bowel.        Impression:      Impression:   1. 20% biliary ejection fraction.     This report was finalized on 04/06/2022 13:15 by Dr. Nadeem Marshall MD.        I have reviewed the patient's current medications.     Assessment/Plan     Active Hospital Problems    Diagnosis    • **Intractable nausea and vomiting    • UTI (urinary tract infection), bacterial    • Sepsis secondary to UTI (HCC)    • Lactic acidosis    • Severe malnutrition (CMS/HCC)    • Hypophosphatemia    • Hypokalemia    • Malfunction of nephrostomy tube (HCC)    • Essential (primary) hypertension      Plan:  The patient was admitted on 4/3 by Dr. Ricks.  She presented with complaints of intractable nausea and vomiting.  Her medical problems started last August when she developed COVID-19 pneumonia and had numerous complications thereafter.  She developed an abdominal hematoma and had to go on dialysis secondary to extrinsic compression of her urinary system.  She was transferred from here to [facility] and required exploratory laparotomy.  She ended up having bilateral percutaneous nephrostomy tubes and also ended up with a left ureteral stent.  She states that her main urologist is Dr. Rina Rey.  She states that she  "stopped producing urine from her percutaneous nephrostomy tube recently and that there are plans for it to be removed.  She states that she had gotten in touch with [facility] to see if someone here could do it so she would not have to travel.     When she left [facility], she spent considerable time at a skilled nursing facility in [place], [place].  She states that she has only been home a few weeks.  She was admitted to [facility] in [place] on 3/9 for electrolyte abnormalities.     Renal function stable yesterday with a creatinine 0.71.  Dr. Ricks began treating a urinary tract infection with ceftriaxone.  She had a multidrug-resistant Klebsiella in October 2021.  I transitioned her to [facility] on 4/3.  Lactate down to 1.7 from 2.8 after fluids.  Urine does appear infected with too numerous to count white blood cells, 4+ bacteria and large leukocyte esterase. The sample was also bloody.  Urine culture seems to be growing 2 different gram-negative bacilli species. One has speciated to Proteus mirabilis that is resistant to ampicillin, levofloxacin, nitrofurantoin, and Bactrim.  Will not narrow antibiotic coverage until the other species is known.  Lactic acidosis improved after fluids and antibiotics.  Procalcitonin elevated at 0.31.  Dr. Dyer is familiar with her previous problems as he saw her last year.  I consulted him to evaluate her.  He wants to treat her current infection and then have her follow back at [facility].     Blood cultures contaminated with coagulase-negative Staphylococcus.    Replaced potassium aggressively. Multiple runs ordered and it is also in her fluids. \"I have always had potassium problems.\"  Potassium 4.4 yesterday morning.  Recent replacement of phosphorus and potassium with IV potassium phosphate.     Dr. Ricks asked for a GI opinion.  Dr. Babin saw and recommended an abdominal ultrasound.  There is a sludge-filled gallbladder with no significant gallbladder " wall thickening, pericholecystic fluid, or convincing evidence of acute cholecystitis.  Mild dilatation of the common hepatic duct with no visualized choledocholithiasis. HIDA scan shows an EF of 20%.  Will consult general surgery to consider laparoscopic cholecystectomy given her sludge-filled gallbladder and equivocal ejection fraction.     She has been complaining of sinus congestion and her ears have been painful. Exam was normal. Continues on flonase, corticosporin eardrops. Will consult ENT because she is now stating that her hearing is affected.      She is on Xarelto for history of pulmonary embolus.  I stopped this medication on 4/5 and placed her on therapeutic Lovenox given the fact that she might require cholecystectomy or endoscopy in the future.    Discharge Planning: I expect the patient to be discharged to home in 2-3 days.    Electronically signed by Fermin Mcclure DO, 04/06/22, 13:10 CDT.

## 2022-04-06 NOTE — CONSULTS
ENT CONSULT NOTE  2022    Patient Identification:  Name: Namita Zabala  Age: 46 y.o.  Sex: female  :  1975  MRN: [id number]                     Date of Admission: 2022      CC:    Hearing loss associated with ear pain    Subjective     HPI:   Location: Bilateral-ears  Duration:  3 days ago  Timing:  acute   Quality:   severe according to patient  Context: As below  Modifying Factors:  nothing  Associated Signs/Symptoms: Patient is a 46-year-old female with a history of COVID-19 in 2021.  Her course was complicated by pulmonary emboli and an abdominal hematoma leading to ureteral obstruction and renal failure requiring temporary dialysis.  She was admitted approximately 3 to 4 days ago with intractable nausea and vomiting associated with hypokalemia.  She states about the same time she began having significant bilateral ear pain associated with gradual but relatively sudden hearing loss which has not resolved.  She has no significant history of otologic surgery or middle ear disease and no significant otologic family history of which she is aware.    She has had some nasal congestion and fullness but no significant periorbital pain or pressure.  She denies drainage from either the ears or the nose.    ROS:  Review of Systems - Negative except as noted in HPI.      HISTORY      Past Medical History:   Diagnosis Date   • Angina at rest (HCC)    • Migraine     Sees Pain Management for injections.    • MVP (mitral valve prolapse)    • Renal disorder    • Syncope         Past Surgical History:   Procedure Laterality Date   • BREAST BIOPSY Right     benign   • INSERTION HEMODIALYSIS CATHETER Left 2021    Procedure: HEMODIALYSIS CATHETER INSERTION;  Surgeon: Anders Kaur MD;  Location: [place] [id number];  Service: Vascular;  Laterality: Left;   • TONSILLECTOMY          Social History     Socioeconomic History   • Marital status:    Tobacco Use   • Smoking status:  Never Smoker   • Smokeless tobacco: Never Used   Substance and Sexual Activity   • Alcohol use: No   • Drug use: No        Medications Prior to Admission   Medication Sig Dispense Refill Last Dose   • butalbital-acetaminophen-caffeine (FIORICET, ESGIC) -40 MG per tablet Take 2 tablets by mouth Every 4 (Four) Hours As Needed for Headache or Migraine.      • eszopiclone (LUNESTA) 3 MG tablet Take 3 mg by mouth Every Night. Take immediately before bedtime      • FLUoxetine (PROzac) 40 MG capsule Take 40 mg by mouth Daily.      • methocarbamol (ROBAXIN) 500 MG tablet Take 500 mg by mouth 3 (Three) Times a Day As Needed for Muscle Spasms.      • metoclopramide (REGLAN) 5 MG/5ML solution Take 5 mg by mouth 3 (Three) Times a Day Before Meals.      • ondansetron ODT (ZOFRAN-ODT) 4 MG disintegrating tablet Place 4 mg on the tongue 4 (Four) Times a Day As Needed for Nausea or Vomiting.      • oxymetazoline (AFRIN) 0.05 % nasal spray 2 sprays into the nostril(s) as directed by provider 2 (Two) Times a Day.      • prochlorperazine (COMPAZINE) 10 MG tablet Take 10 mg by mouth Every 8 (Eight) Hours As Needed for Nausea or Vomiting.      • promethazine (PHENERGAN) 25 MG tablet Take 25-50 mg by mouth Every 6 (Six) Hours As Needed for Nausea or Vomiting.      • rivaroxaban (XARELTO) 20 MG tablet Take 20 mg by mouth Every Night.      • rizatriptan (MAXALT) 10 MG tablet Take 10 mg by mouth 1 (One) Time As Needed for Migraine. May repeat in 2 hours if needed      • traZODone (DESYREL) 100 MG tablet Take 100 mg by mouth Every Night.      • Calcium Carbonate-Simethicone 750-80 MG chewable tablet Chew 1 tablet Daily.      • pantoprazole (PROTONIX) 20 MG EC tablet Take 20 mg by mouth Daily.           Allergies   Allergen Reactions   • Coconut Unknown - High Severity   • Nuts Unknown - High Severity   • Penicillins    • [place] Other (See Comments)     Causes migraines per pt reports        There is no immunization history on file for  this patient.     Family History   Problem Relation Age of Onset   • Colon cancer Maternal Aunt 52   • Fibromyalgia Mother    • Heart disease Mother    • Hypertension Mother    • Hodgkin's lymphoma Paternal Grandmother    • Ovarian cancer Neg Hx    • Breast cancer Neg Hx           Objective     PE:    Temp:  [97.9 °F (36.6 °C)-98.6 °F (37 °C)] 98.6 °F (37 °C)  Heart Rate:  [106-112] 112  Resp:  [16-20] 16  BP: (112-136)/(81-98) 130/86   Body mass index is 24.35 kg/m².     General appearance: acyanotic, in no respiratory distress.   Ability to Communicate: Patient communicates well in terms of speaking although she has difficulty hearing.  It is interesting to note that she was listening to the TV on when I arrived in the room but appeared not to be able to hear me at all during our conversation.   Ears - bilateral TM's and external ear canals normal, right ear normal, left ear normal.  Well ventilated middle ear spaces are noted bilaterally.   Nasal exam - normal and patent, no erythema, discharge or polyps.    Facial exam: facial movement was normal and symmetrical, associated significant bilateral preauricular tenderness is appreciated on mandibular depression.   Oropharyngeal exam - mucous membranes moist, pharynx normal without lesions.   Neck exam - supple, no significant adenopathy.   CVS exam: normal rate and regular rhythm.   Chest: no tachypnea, retractions or cyanosis.   No lymphadenopathy in the anterior or posterior neck, supraclavicular areas.    Neurological exam reveals neck supple without rigidity.    DATA      MEDICATIONS     Current Facility-Administered Medications   Medication Dose Route Frequency Provider Last Rate Last Admin   • butalbital-acetaminophen-caffeine (FIORICET, ESGIC) -40 MG per tablet 2 tablet  2 tablet Oral Q4H PRN Frank Ricks MD       • droperidol (INAPSINE) injection 1.25 mg  1.25 mg Intravenous Q6H PRN Frank Ricks MD       • enoxaparin (LOVENOX) syringe 60  mg  1 mg/kg Subcutaneous Q12H Fermin Mcclure, DO   60 mg at 04/06/22 1233   • ertapenem (INVanz) 1 g in sodium chloride 0.9 % 100 mL IVPB-VTB  1 g Intravenous Q24H Fermin Mcclure  mL/hr at 04/06/22 1539 1 g at 04/06/22 1539   • FLUoxetine (PROzac) capsule 40 mg  40 mg Oral Daily Frank Ricks MD   40 mg at 04/06/22 1233   • fluticasone (FLONASE) 50 MCG/ACT nasal spray 2 spray  2 spray Each Nare BID Frank Ricks MD   2 spray at 04/05/22 2027   • methocarbamol (ROBAXIN) tablet 500 mg  500 mg Oral BID PRN Frank Ricks MD       • metoclopramide (REGLAN) injection 5 mg  5 mg Intravenous Q6H Fermin Mcclure, DO   5 mg at 04/06/22 1233   • neomycin-polymyxin-hydrocortisone (CORTISPORIN) otic suspension 4 drop  4 drop Both Ears Q6H Fermin Mcclure DO   4 drop at 04/06/22 1233   • ondansetron (ZOFRAN) tablet 4 mg  4 mg Oral Q6H PRN Frank Ricks MD        Or   • ondansetron (ZOFRAN) injection 4 mg  4 mg Intravenous Q6H PRN Frank Ricks MD   4 mg at 04/05/22 1407   • oxymetazoline (AFRIN) nasal spray 2 spray  2 spray Nasal BID Frank Ricks MD   2 spray at 04/05/22 2027   • pantoprazole (PROTONIX) injection 40 mg  40 mg Intravenous Q12H Frank Ricks MD   40 mg at 04/06/22 1233   • prochlorperazine (COMPAZINE) tablet 10 mg  10 mg Oral Q8H PRN Frank Ricks MD       • promethazine (PHENERGAN) tablet 25 mg  25 mg Oral Q6H PRN Frank Ricks MD       • sodium bicarbonate tablet 650 mg  650 mg Oral BID Fermin Mcclure DO   650 mg at 04/06/22 1233   • sodium chloride 0.9 % flush 10 mL  10 mL Intravenous PRN Frank Ricks MD       • sodium chloride 0.9 % flush 10 mL  10 mL Intravenous PRN Frank Ricks MD       • sodium chloride 0.9 % flush 10 mL  10 mL Intravenous PRN Frank Ricks MD       • sodium chloride 0.9 % flush 10 mL  10 mL Intravenous Q12H Frank Ricks MD   10 mL at 04/06/22 1234   • sodium chloride 0.9 % flush 10 mL  10 mL Intravenous PRN  Frank Ricks MD       • SUMAtriptan (IMITREX) tablet 50 mg  50 mg Oral Once Frank Ricks MD       • traZODone (DESYREL) tablet 100 mg  100 mg Oral Nightly Frank Ricks MD   100 mg at 04/05/22 2027   • zolpidem (AMBIEN) tablet 5 mg  5 mg Oral Nightly PRN Frank Ricks MD                Intake/Output Summary (Last 24 hours) at 4/6/2022 1605  Last data filed at 4/6/2022 0620  Gross per 24 hour   Intake --   Output 500 ml   Net -500 ml          WBC   Date Value Ref Range Status   04/05/2022 7.64 3.40 - 10.80 10*3/mm3 Final     Hemoglobin   Date Value Ref Range Status   04/05/2022 9.6 (L) 12.0 - 15.9 g/dL Final     Hematocrit   Date Value Ref Range Status   04/05/2022 30.1 (L) 34.0 - 46.6 % Final     Platelets   Date Value Ref Range Status   04/05/2022 303 140 - 450 10*3/mm3 Final     Sodium   Date Value Ref Range Status   04/05/2022 141 136 - 145 mmol/L Final     Potassium   Date Value Ref Range Status   04/05/2022 4.4 3.5 - 5.2 mmol/L Final     Comment:     Slight hemolysis detected by analyzer. Results may be affected.     Chloride   Date Value Ref Range Status   04/05/2022 112 (H) 98 - 107 mmol/L Final     CO2   Date Value Ref Range Status   04/05/2022 18.0 (L) 22.0 - 29.0 mmol/L Final     BUN   Date Value Ref Range Status   04/05/2022 7 6 - 20 mg/dL Final     Creatinine   Date Value Ref Range Status   04/05/2022 0.71 0.57 - 1.00 mg/dL Final     Glucose   Date Value Ref Range Status   04/05/2022 89 65 - 99 mg/dL Final     Calcium   Date Value Ref Range Status   04/05/2022 7.4 (L) 8.6 - 10.5 mg/dL Final     Magnesium   Date Value Ref Range Status   04/04/2022 2.0 1.6 - 2.6 mg/dL Final     Phosphorus   Date Value Ref Range Status   04/05/2022 2.2 (L) 2.5 - 4.5 mg/dL Final     No results found for: AST, ALT, ALKPHOS  No results found for: APTT, INR  No results found for: COLORU, CLARITYU, SPECGRAV, PHUR, PROTEINUR, GLUCOSEU, KETONESU, BLOODU, NITRITE, LEUKOCYTESUR, BILIRUBINUR, UROBILINOGEN,  RBCUA, WBCUA, BACTERIA, UACOMMENT  No results found for: TROPONINT, TROPONINI, BNP  No components found for: HGBA1C;2  No components found for: TSH;2        Imaging Results (All)     Procedure Component Value Units Date/Time    NM HIDA SCAN WITHOUT PHARMACOLOGICAL INTERVENTION [[id number]] Collected: 04/06/22 1313     Updated: 04/06/22 1318    Narrative:      EXAMINATION:   NM HIDA SCAN WITHOUT PHARMACOLOGICAL INTERVENTION-   4/6/2022 1:13 PM CDT     HISTORY: Sludge in the gallbladder NM HIDA SCAN WITHOUT PHARMACOLOGICAL  INTERVENTION- 4/6/2022 10:16 AM CDT     Clinical History: Persistent nausea and vomiting, sludge-filled  gallbladder; E87.6-Hypokalemia; N39.0-Urinary tract infection, site not  specified; R31.9-Hematuria, unspecified; R11.2-Nausea with vomiting,  unspecified; E87.2-Acidosis     Comparison: None.      Radiopharmaceutical: 4.3 mCi technetium 99m Mebrofenin IV.      Technique: Anterior images were acquired over the upper abdomen for a  period of 60 minutes following intravenous administration of the  radiopharmaceutical.      Subsequently, the patient ingested a 1 ounce mixture of corn oil  emulsion, and additional images of the abdomen were obtained for 60  minutes. Gallbladder ejection fraction was calculated.      Findings:    There is prompt progression of the isotope from the liver into the  intrahepatic biliary ducts, common bile duct and gallbladder.  Radiotracer is noted in the small bowel at the end of 60 minutes.      Following ingestion of the corn oil emulsion, calculated gallbladder  ejection fraction is 20% (normal > 20%). There is further radiotracer  accumulation in the small bowel.        Impression:      Impression:   1. 20% biliary ejection fraction.     This report was finalized on 04/06/2022 13:15 by Dr. Nadeem Marshall MD.    US Abdomen Limited [[id number]] Collected: 04/05/22 1159     Updated: 04/05/22 1206    Narrative:      EXAMINATION: US ABDOMEN LIMITED- 4/5/2022 11:59 AM  CDT     HISTORY: Persistent N/V; E87.6-Hypokalemia; N39.0-Urinary tract  infection, site not specified; R31.9-Hematuria, unspecified;  R11.2-Nausea with vomiting, unspecified; E87.2-Acidosis     REPORT: Limited abdominal sonography was performed.     COMPARISON: CT abdomen pelvis with contrast 4/2/2022.     There is excessive bowel gas in the tail the pancreas and proximal aorta  are obscured. There is increased echogenicity of the liver compatible  with steatosis. The visualized aorta and IVC appear normal caliber. The  gallbladder is filled with sludge, no definite gallstones are  identified. Color wall thickness measures 3.3 mm. No pericholecystic  fluid is identified. The common hepatic duct measures 5.9 mm in  diameter, which is slightly dilated. No choledocholithiasis is  visualized. The right kidney measures 8.9 x 4.6 x 4.3 cm and appears  unremarkable. No hydronephrosis is identified. No free fluid is seen.       Impression:      1. Sludge-filled gallbladder with no significant gallbladder wall  thickening, pericholecystic fluid or convincing evidence of acute  cholecystitis. Mild dilation of the common hepatic duct with no  visualized choledocholithiasis.  2. Hepatic steatosis.        This report was finalized on 04/05/2022 12:03 by Dr. Florian Laurent MD.    CT Abdomen Pelvis With Contrast [[id number]] Collected: 04/03/22 0743     Updated: 04/03/22 0751    Narrative:      EXAMINATION:   CT ABDOMEN PELVIS W CONTRAST-  4/3/2022 7:43 AM CDT     HISTORY: CT ABDOMEN AND PELVIS WITH CONTRAST 4/2/2022 10:09 PM CDT     HISTORY: Vomiting     COMPARISON: October 15, 2021.      DLP: 249 mGy cm Automated exposure control was also utilized to decrease  patient radiation dose.     TECHNIQUE: Following the intravenous administration of contrast, helical  CT tomographic images of the abdomen and pelvis were acquired. Coronal  reformatted images were also provided for review.      FINDINGS:   The lung bases and base of the  heart are unremarkable.      LIVER: Decreased CT attenuation liver is noted consistent with hepatic  steatosis..      BILIARY SYSTEM: The gallbladder is unremarkable. No intrahepatic or  extrahepatic ductal dilatation.      PANCREAS: No focal pancreatic lesion.      SPLEEN: Unremarkable.      KIDNEYS AND ADRENALS: Adrenal glands are visualized. The right renal  contour is unremarkable.     The left renal contour is visualized. A percutaneous left nephrostomy  catheter is present. A double pigtail left ureteral catheters present  satisfactorily position.. There may be mild enhancement of the left  renal pelvis. This may be reactive or possibly infectious. There is no  perinephric stranding. The right ureters unremarkable.     RETROPERITONEUM: No mass, lymphadenopathy or hemorrhage.      GI TRACT: No evidence of obstruction or bowel wall thickening. The  appendix is visualized and unremarkable.     OTHER: There is no mesenteric mass, lymphadenopathy or fluid collection.  The abdominopelvic vasculature is patent. The osseous structures and  soft tissues demonstrate no worrisome lesions.     PELVIS: No mass lesion, fluid collection or significant lymphadenopathy  is seen in the pelvis. The urinary bladder wall enhances. This may be  reactive. This may be inflammation..       Impression:      1. Left-sided external nephrostomy stent is present. Left internal  double pigtail ureteral stent catheters present. Mild enhancement of the  wall of the left renal pelvis and bladder is noted possibly an  infectious etiology.  2. Hepatic steatosis.     These images initially reviewed by stat rad at 2242 hours.         This report was finalized on 04/03/2022 07:48 by Dr. Nadeem Marshall MD.    XR Chest 1 View [[id number]] Collected: 04/02/22 2146     Updated: 04/02/22 2151    Narrative:      EXAM: XR CHEST 1 VW-     INDICATION: weakness     COMPARISON: 9/20/2021     FINDINGS:     Cardiac silhouette is normal in size. Chronic mild  RIGHT hemidiaphragm  elevation with associated RIGHT basilar atelectasis. No pleural  effusion, pneumothorax, or focal consolidation. No acute osseous  finding.       Impression:         No acute findings. Mild chronic RIGHT hemidiaphragm elevation with  presumed RIGHT basilar atelectasis.   This report was finalized on 04/02/2022 21:48 by Dr. Gomez Cárdenas MD.             Assessment     ASSESSMENT        Intractable nausea and vomiting    Sepsis secondary to UTI (HCC)    Lactic acidosis    Hypokalemia    Essential (primary) hypertension    UTI (urinary tract infection), bacterial    Malfunction of nephrostomy tube (HCC)    Severe malnutrition (CMS/HCC)    Hypophosphatemia     History consistent with sudden presumptive sensorineural hearing loss associated with normal ear exam      Bilateral temporomandibular joint disease      Plan     PLAN      Audiometric evaluation tomorrow-discussed with Dr. Mcclure   TMJ precautions with utilization of pain medication when able and a softer diet with possible bite block when the patient is discharged from the hospital.   Further recommendations to follow          Manny Nichols MD  4/6/2022  16:05 CDT

## 2022-04-07 ENCOUNTER — APPOINTMENT (OUTPATIENT)
Dept: CT IMAGING | Facility: HOSPITAL | Age: 47
End: 2022-04-07

## 2022-04-07 LAB
ALBUMIN SERPL-MCNC: 2.4 G/DL (ref 3.5–5.2)
ALBUMIN/GLOB SERPL: 1 G/DL
ALP SERPL-CCNC: 91 U/L (ref 39–117)
ALT SERPL W P-5'-P-CCNC: 12 U/L (ref 1–33)
ANION GAP SERPL CALCULATED.3IONS-SCNC: 12 MMOL/L (ref 5–15)
AST SERPL-CCNC: 25 U/L (ref 1–32)
BACTERIA SPEC AEROBE CULT: ABNORMAL
BACTERIA SPEC AEROBE CULT: ABNORMAL
BACTERIA SPEC AEROBE CULT: NORMAL
BILIRUB SERPL-MCNC: 0.3 MG/DL (ref 0–1.2)
BUN SERPL-MCNC: 6 MG/DL (ref 6–20)
BUN/CREAT SERPL: 7.9 (ref 7–25)
CALCIUM SPEC-SCNC: 8.2 MG/DL (ref 8.6–10.5)
CHLORIDE SERPL-SCNC: 110 MMOL/L (ref 98–107)
CO2 SERPL-SCNC: 17 MMOL/L (ref 22–29)
CREAT SERPL-MCNC: 0.76 MG/DL (ref 0.57–1)
DEPRECATED RDW RBC AUTO: 49 FL (ref 37–54)
EGFRCR SERPLBLD CKD-EPI 2021: 99.2 ML/MIN/1.73
ERYTHROCYTE [DISTWIDTH] IN BLOOD BY AUTOMATED COUNT: 15.8 % (ref 12.3–15.4)
GLOBULIN UR ELPH-MCNC: 2.3 GM/DL
GLUCOSE SERPL-MCNC: 76 MG/DL (ref 65–99)
HCT VFR BLD AUTO: 32.3 % (ref 34–46.6)
HGB BLD-MCNC: 10.6 G/DL (ref 12–15.9)
MAGNESIUM SERPL-MCNC: 1.7 MG/DL (ref 1.6–2.6)
MCH RBC QN AUTO: 28 PG (ref 26.6–33)
MCHC RBC AUTO-ENTMCNC: 32.8 G/DL (ref 31.5–35.7)
MCV RBC AUTO: 85.4 FL (ref 79–97)
PHOSPHATE SERPL-MCNC: 1.9 MG/DL (ref 2.5–4.5)
PLATELET # BLD AUTO: 255 10*3/MM3 (ref 140–450)
PMV BLD AUTO: 9 FL (ref 6–12)
POTASSIUM SERPL-SCNC: 5 MMOL/L (ref 3.5–5.2)
PROT SERPL-MCNC: 4.7 G/DL (ref 6–8.5)
RBC # BLD AUTO: 3.78 10*6/MM3 (ref 3.77–5.28)
SODIUM SERPL-SCNC: 139 MMOL/L (ref 136–145)
WBC NRBC COR # BLD: 7.85 10*3/MM3 (ref 3.4–10.8)

## 2022-04-07 PROCEDURE — 84100 ASSAY OF PHOSPHORUS: CPT | Performed by: INTERNAL MEDICINE

## 2022-04-07 PROCEDURE — 83735 ASSAY OF MAGNESIUM: CPT | Performed by: INTERNAL MEDICINE

## 2022-04-07 PROCEDURE — 74177 CT ABD & PELVIS W/CONTRAST: CPT

## 2022-04-07 PROCEDURE — 25010000002 ERTAPENEM PER 500 MG: Performed by: INTERNAL MEDICINE

## 2022-04-07 PROCEDURE — 63710000001 PROMETHAZINE PER 25 MG: Performed by: INTERNAL MEDICINE

## 2022-04-07 PROCEDURE — 25010000002 MORPHINE SULFATE (PF) 2 MG/ML SOLUTION: Performed by: INTERNAL MEDICINE

## 2022-04-07 PROCEDURE — 25010000002 ONDANSETRON PER 1 MG: Performed by: INTERNAL MEDICINE

## 2022-04-07 PROCEDURE — 99231 SBSQ HOSP IP/OBS SF/LOW 25: CPT | Performed by: OTOLARYNGOLOGY

## 2022-04-07 PROCEDURE — 25010000002 METOCLOPRAMIDE PER 10 MG: Performed by: INTERNAL MEDICINE

## 2022-04-07 PROCEDURE — 0 IOPAMIDOL PER 1 ML: Performed by: INTERNAL MEDICINE

## 2022-04-07 PROCEDURE — 71275 CT ANGIOGRAPHY CHEST: CPT

## 2022-04-07 PROCEDURE — 80053 COMPREHEN METABOLIC PANEL: CPT | Performed by: INTERNAL MEDICINE

## 2022-04-07 PROCEDURE — 85027 COMPLETE CBC AUTOMATED: CPT | Performed by: INTERNAL MEDICINE

## 2022-04-07 RX ORDER — SODIUM CHLORIDE, SODIUM LACTATE, POTASSIUM CHLORIDE, CALCIUM CHLORIDE 600; 310; 30; 20 MG/100ML; MG/100ML; MG/100ML; MG/100ML
75 INJECTION, SOLUTION INTRAVENOUS CONTINUOUS
Status: DISPENSED | OUTPATIENT
Start: 2022-04-07 | End: 2022-04-08

## 2022-04-07 RX ORDER — SODIUM CHLORIDE 9 MG/ML
75 INJECTION, SOLUTION INTRAVENOUS CONTINUOUS
Status: DISCONTINUED | OUTPATIENT
Start: 2022-04-07 | End: 2022-04-07

## 2022-04-07 RX ADMIN — MORPHINE SULFATE 1 MG: 2 INJECTION, SOLUTION INTRAMUSCULAR; INTRAVENOUS at 09:04

## 2022-04-07 RX ADMIN — Medication 2 SPRAY: at 20:28

## 2022-04-07 RX ADMIN — ERTAPENEM SODIUM 1 G: 1 INJECTION, POWDER, LYOPHILIZED, FOR SOLUTION INTRAMUSCULAR; INTRAVENOUS at 15:18

## 2022-04-07 RX ADMIN — METOCLOPRAMIDE HYDROCHLORIDE 5 MG: 5 INJECTION INTRAMUSCULAR; INTRAVENOUS at 23:08

## 2022-04-07 RX ADMIN — NEOMYCIN SULFATE, POLYMYXIN B SULFATE AND HYDROCORTISONE 4 DROP: 10; 3.5; 1 SUSPENSION/ DROPS AURICULAR (OTIC) at 12:41

## 2022-04-07 RX ADMIN — METOCLOPRAMIDE HYDROCHLORIDE 5 MG: 5 INJECTION INTRAMUSCULAR; INTRAVENOUS at 17:37

## 2022-04-07 RX ADMIN — NEOMYCIN SULFATE, POLYMYXIN B SULFATE AND HYDROCORTISONE 4 DROP: 10; 3.5; 1 SUSPENSION/ DROPS AURICULAR (OTIC) at 23:08

## 2022-04-07 RX ADMIN — FLUTICASONE PROPIONATE 2 SPRAY: 50 SPRAY, METERED NASAL at 20:28

## 2022-04-07 RX ADMIN — METOCLOPRAMIDE HYDROCHLORIDE 5 MG: 5 INJECTION INTRAMUSCULAR; INTRAVENOUS at 04:51

## 2022-04-07 RX ADMIN — METOCLOPRAMIDE HYDROCHLORIDE 5 MG: 5 INJECTION INTRAMUSCULAR; INTRAVENOUS at 12:41

## 2022-04-07 RX ADMIN — ONDANSETRON 4 MG: 2 INJECTION INTRAMUSCULAR; INTRAVENOUS at 09:04

## 2022-04-07 RX ADMIN — NEOMYCIN SULFATE, POLYMYXIN B SULFATE AND HYDROCORTISONE 4 DROP: 10; 3.5; 1 SUSPENSION/ DROPS AURICULAR (OTIC) at 04:50

## 2022-04-07 RX ADMIN — PANTOPRAZOLE SODIUM 40 MG: 40 INJECTION, POWDER, FOR SOLUTION INTRAVENOUS at 09:04

## 2022-04-07 RX ADMIN — SODIUM PHOSPHATE, MONOBASIC, MONOHYDRATE 15 MMOL: 276; 142 INJECTION, SOLUTION INTRAVENOUS at 09:09

## 2022-04-07 RX ADMIN — IOPAMIDOL 100 ML: 755 INJECTION, SOLUTION INTRAVENOUS at 15:02

## 2022-04-07 RX ADMIN — NEOMYCIN SULFATE, POLYMYXIN B SULFATE AND HYDROCORTISONE 4 DROP: 10; 3.5; 1 SUSPENSION/ DROPS AURICULAR (OTIC) at 17:37

## 2022-04-07 RX ADMIN — PROMETHAZINE HYDROCHLORIDE 25 MG: 25 TABLET ORAL at 03:14

## 2022-04-07 RX ADMIN — PANTOPRAZOLE SODIUM 40 MG: 40 INJECTION, POWDER, FOR SOLUTION INTRAVENOUS at 20:27

## 2022-04-07 RX ADMIN — Medication 10 ML: at 09:09

## 2022-04-07 RX ADMIN — SODIUM CHLORIDE, POTASSIUM CHLORIDE, SODIUM LACTATE AND CALCIUM CHLORIDE 75 ML/HR: 600; 310; 30; 20 INJECTION, SOLUTION INTRAVENOUS at 15:18

## 2022-04-07 RX ADMIN — MORPHINE SULFATE 1 MG: 2 INJECTION, SOLUTION INTRAMUSCULAR; INTRAVENOUS at 04:51

## 2022-04-07 NOTE — PLAN OF CARE
Goal Outcome Evaluation:  Plan of Care Reviewed With: patient        Progress: no change  Outcome Evaluation: continues to c/o nausea at all times, evene when she is asleep and you wake her she c/o nausea, urine cx came back + esbl contact precautions started, LR now going at 75

## 2022-04-07 NOTE — PROGRESS NOTES
Will order ct chest, abdomen, and pelvis for re-evaluation of lungs as well as gallbladder and remaining degree of inflammatory changes in the midline and bladder regions.

## 2022-04-07 NOTE — PROGRESS NOTES
[facility] Medicine Services  INPATIENT PROGRESS NOTE    Patient Name: Namita Zabala  Date of Admission: 4/2/2022  Today's Date: 04/07/22  Length of Stay: 3  Primary Care Physician: David Lara MD    Subjective   Chief Complaint: Persistent nausea.   HPI  Dr. Damon and I discussed her case.  She is interested in taking her for laparoscopic cholecystectomy, but wants to obtain a chest, abdomen, and pelvis CT to reevaluate her previous issues from urologic standpoint and also whether or not she has any active pulmonary emboli.  She also asked that I hold the patient's anticoagulation completely for right now.  Her last dose of therapeutic Lovenox was yesterday at 1233.  Her last dose of Xarelto was on the evening of 4/4.      Dr. Nichols and I discussed her case.  He is going to bring her to the office today to do audiograms.    Her , Grant, called me back yesterday and we discussed her case.      No new complaints. Awaiting assistance from consultants.     Review of Systems  All pertinent negatives and positives are as above. All other systems have been reviewed and are negative unless otherwise stated.     Objective    Temp:  [97 °F (36.1 °C)-98.6 °F (37 °C)] 97.4 °F (36.3 °C)  Heart Rate:  [100-112] 100  Resp:  [16-18] 16  BP: (122-135)/(78-86) 135/83  Physical Exam  Constitutional:       Appearance: She is well-developed.      Comments: Up in bed.  Awoken from sleep.  No distress.  No family present.  Discussed with her nurse, Bora.   HENT:      Head: Normocephalic and atraumatic.   Eyes:      Conjunctiva/sclera: Conjunctivae normal.      Pupils: Pupils are equal, round, and reactive to light.   Neck:      Vascular: No JVD.   Cardiovascular:      Rate and Rhythm: Normal rate and regular rhythm.      Heart sounds: Normal heart sounds.   Pulmonary:      Effort: Pulmonary effort is normal. No respiratory distress.      Breath sounds: Normal breath sounds.    Abdominal:      General: Bowel sounds are normal. There is no distension.      Palpations: Abdomen is soft.      Tenderness: There is no abdominal tenderness.   Musculoskeletal:         General: No tenderness or deformity. Normal range of motion.      Cervical back: Neck supple.   Skin:     General: Skin is warm and dry.      Findings: No rash.   Neurological:      General: No focal deficit present.      Mental Status: She is alert and oriented to person, place, and time.      Cranial Nerves: No cranial nerve deficit.      Motor: Weakness present. No abnormal muscle tone.      Deep Tendon Reflexes: Reflexes normal.   Psychiatric:      Comments: Flat affect, drowsy today.       Results Review:  I have reviewed the labs, radiology results, and diagnostic studies.    Laboratory Data:   Results from last 7 days   Lab Units 04/07/22  0713 04/05/22  0749 04/04/22  1328   WBC 10*3/mm3 7.85 7.64 7.29   HEMOGLOBIN g/dL 10.6* 9.6* 9.6*   HEMATOCRIT % 32.3* 30.1* 29.2*   PLATELETS 10*3/mm3 255 303 293     Results from last 7 days   Lab Units 04/07/22  0713 04/05/22  0748 04/04/22  1328 04/03/22  1824 04/03/22  0713 04/02/22  2113   SODIUM mmol/L 139 141 141  --  137 137   POTASSIUM mmol/L 5.0 4.4 3.7   < > 2.5* 2.6*   CHLORIDE mmol/L 110* 112* 109*  --  97* 90*   CO2 mmol/L 17.0* 18.0* 18.0*  --  25.0 27.0   BUN mg/dL 6 7 8  --  13 13   CREATININE mg/dL 0.76 0.71 0.90  --  0.95 1.11*   CALCIUM mg/dL 8.2* 7.4* 7.8*  --  8.7 10.6*   BILIRUBIN mg/dL 0.3  --   --   --  0.6 1.2   ALK PHOS U/L 91  --   --   --  100 149*   ALT (SGPT) U/L 12  --   --   --  13 20   AST (SGOT) U/L 25  --   --   --  18 28   GLUCOSE mg/dL 76 89 115*  --  100* 97    < > = values in this interval not displayed.     Results from last 7 days   Lab Units 04/07/22  0713 04/05/22  0748 04/04/22  1328 04/02/22  2113   MAGNESIUM mg/dL 1.7  --  2.0 1.7   PHOSPHORUS mg/dL 1.9* 2.2* 1.2*  --      Culture Data:   Blood Culture   Date Value Ref Range Status    04/02/2022 No growth at 4 days  Preliminary   04/02/2022 Staphylococcus, coagulase negative (C)  Final     Urine Culture   Date Value Ref Range Status   04/02/2022 >100,000 CFU/mL Proteus mirabilis (A)  Final   04/02/2022 >100,000 CFU/mL Klebsiella oxytoca ESBL (A)  Final     Comment:     Consider infectious disease consult.  Susceptibility results may not correlate to clinical outcomes.     I have reviewed the patient's current medications.     Assessment/Plan     Active Hospital Problems    Diagnosis    • **Intractable nausea and vomiting    • UTI (urinary tract infection), bacterial    • Sepsis secondary to UTI (HCC)    • Lactic acidosis    • Severe malnutrition (CMS/HCC)    • Hypophosphatemia    • Hypokalemia    • Malfunction of nephrostomy tube ([facility])    • Essential (primary) hypertension      Plan:  The patient was admitted on 4/3 by Dr. Ricks.  She presented with complaints of intractable nausea and vomiting.  Her medical problems started last August when she developed COVID-19 pneumonia and had numerous complications thereafter.  She developed an abdominal hematoma and had to go on dialysis secondary to extrinsic compression of her urinary system. She was transferred from here to [facility] and required exploratory laparotomy.  She ended up having bilateral percutaneous nephrostomy tubes and also ended up with a left ureteral stent.  She states that her main urologist is Dr. Rina Rey.  She states that she stopped producing urine from her percutaneous nephrostomy tube recently and that there are plans for it to be removed.  She states that she had gotten in touch with [facility] to see if someone here could do it so she would not have to travel.     When she left [facility], she spent considerable time at a skilled nursing facility in [place], [place].  She states that she has only been home a few weeks.  She was admitted to [facility] in [place] on 3/9 for electrolyte  "abnormalities.     Renal function stable yesterday with a creatinine 0.76.  Dr. Ricks began treating a urinary tract infection with ceftriaxone.  She had a multidrug-resistant Klebsiella in October 2021.  I transitioned her to Replaced by [facility] on 4/3.  Lactate down to 1.7 from 2.8 after fluids.  Urine does appear infected with too numerous to count white blood cells, 4+ bacteria and large leukocyte esterase. The sample was also bloody.  Urine culture has grown 2 distinct pathogens.  There is a multidrug-resistant Proteus mirabilis strain and an ESBL Klebsiella strain.  Invanz should be treating both of these organisms concurrently.  Today is day 5 of Invanz.  We will treat 7-10 days.  Lactic acidosis improved after fluids and antibiotics.  Procalcitonin elevated at 0.31.  Dr. Dyer is familiar with her previous problems as he saw her last year.  I consulted him to evaluate her.  He wants to treat her current infection and then have her follow back at [place].     Blood cultures contaminated with coagulase-negative Staphylococcus.    Replaced potassium aggressively. Multiple runs ordered and it is also in her fluids. \"I have always had potassium problems.\"  Potassium now 5.0.  Recent replacement of phosphorus and potassium with IV potassium phosphate.  Will replace phosphorus today with IV sodium phosphate.  Started oral sodium bicarbonate on 4/5.     Dr. Ricks asked for a GI opinion.  Dr. Babin saw and recommended an abdominal ultrasound.  There is a sludge-filled gallbladder with no significant gallbladder wall thickening, pericholecystic fluid, or convincing evidence of acute cholecystitis.  Mild dilatation of the common hepatic duct with no visualized choledocholithiasis. HIDA scan shows an EF of 20%. Consulted general surgery to consider laparoscopic cholecystectomy given her sludge-filled gallbladder and equivocal ejection fraction.  Dr. Alicia Damon is interested in doing this, but would like to obtain new " CT scans today and also have her off anticoagulation a bit longer.     She has been complaining of sinus congestion and her ears have been painful. Exam was normal. Continues on flonase, corticosporin eardrops. Consulted ENT on 4/6.  Discussed with Dr. Nichols.  He is going to take her for audiograms in the office today.       She is on Xarelto for history of pulmonary embolus. Dr. Damon would like for her to be off all anticoagulation for the time being.  She has not had Xarelto since the evening of 4/4 and has not had therapeutic Lovenox since 1233 on 4/6.    Discharge Planning: I expect the patient to be discharged to home in 2-3 days.    Electronically signed by Fermin Mcclure DO, 04/07/22, 13:55 CDT.

## 2022-04-07 NOTE — PLAN OF CARE
"Goal Outcome Evaluation:  Plan of Care Reviewed With: patient        Progress: no change  Outcome Evaluation: Pt c/o frequent nausea.  States \"I feel bad\", but will not elaborate.  Did c/o abd pain as well.  PRN morphine given as well as PRN phenergan. Scheduled reglan given.  Refuses turns at times.  Good urine output.  NPO since midnight except sips with meds for possible javad today.  "

## 2022-04-07 NOTE — PROGRESS NOTES
[facility]  ENT PROGRESS NOTES  2022      Patient Identification:  Name: Namita aZbala  Age: 46 y.o.  Sex: female  :  1975  MRN: [id number]                     Date of Admission: 2022      CC:    Subjective hearing loss  Subjective   Patient with history of ear pain and subjective hearing loss    HISTORY   HPI, ROS, PMFSHx reviewed:   No changes       Objective     PE:    Temp:  [97 °F (36.1 °C)-98.2 °F (36.8 °C)] 97.4 °F (36.3 °C)  Heart Rate:  [100-108] 108  Resp:  [16-18] 16  BP: (122-135)/(78-85) 124/80   Body mass index is 24.35 kg/m².     General appearance: acyanotic, in no respiratory distress.   Ability to Communicate:    Facial exam: no craniofacial deformities   Ears - right ear normal, left ear normal.   Nasal exam - normal and patent, no erythema, discharge or polyps.   Oropharyngeal exam - not examined.   Neck exam - supple, no significant adenopathy.     CVS exam: not examined.   Chest: no tachypnea, retractions or cyanosis.   No lymphadenopathy in the anterior or posterior neck, supraclavicular areas.   Neurological exam reveals neck supple without rigidity.    DATA      MEDICATIONS   I have reviewed the current MAR.     LABS AND IMAGING      I have reviewed the labs and imaging data since the patient was last seen.       Assessment     ASSESSMENT       Intractable nausea and vomiting    Sepsis secondary to UTI (HCC)    Lactic acidosis    Hypokalemia    Essential (primary) hypertension    UTI (urinary tract infection), bacterial    Malfunction of nephrostomy tube (HCC)    Severe malnutrition (CMS/HCC)    Hypophosphatemia    Hearing loss    TMJ      Plan     PLAN     -Discussed with Dr. Mcclure possibility of transporting the patient for audiometric evaluation in the office and he agreed -That the patient can tolerate transport  -Audio tomorrow 11:00 AM  -Further recommendations to follow-          Manny Nichols MD  2022  16:16 CDT

## 2022-04-08 LAB
ABO GROUP BLD: NORMAL
ALBUMIN SERPL-MCNC: 2.5 G/DL (ref 3.5–5.2)
ALBUMIN/GLOB SERPL: 1 G/DL
ALP SERPL-CCNC: 97 U/L (ref 39–117)
ALT SERPL W P-5'-P-CCNC: 12 U/L (ref 1–33)
ANION GAP SERPL CALCULATED.3IONS-SCNC: 11 MMOL/L (ref 5–15)
AST SERPL-CCNC: 25 U/L (ref 1–32)
BILIRUB SERPL-MCNC: 0.4 MG/DL (ref 0–1.2)
BLD GP AB SCN SERPL QL: NEGATIVE
BUN SERPL-MCNC: 7 MG/DL (ref 6–20)
BUN/CREAT SERPL: 9.1 (ref 7–25)
CALCIUM SPEC-SCNC: 8.5 MG/DL (ref 8.6–10.5)
CHLORIDE SERPL-SCNC: 107 MMOL/L (ref 98–107)
CO2 SERPL-SCNC: 19 MMOL/L (ref 22–29)
CREAT SERPL-MCNC: 0.77 MG/DL (ref 0.57–1)
DEPRECATED RDW RBC AUTO: 49.1 FL (ref 37–54)
EGFRCR SERPLBLD CKD-EPI 2021: 97.7 ML/MIN/1.73
ERYTHROCYTE [DISTWIDTH] IN BLOOD BY AUTOMATED COUNT: 15.7 % (ref 12.3–15.4)
GLOBULIN UR ELPH-MCNC: 2.6 GM/DL
GLUCOSE SERPL-MCNC: 82 MG/DL (ref 65–99)
HCT VFR BLD AUTO: 30.8 % (ref 34–46.6)
HGB BLD-MCNC: 10.1 G/DL (ref 12–15.9)
INR PPP: 1.46 (ref 0.91–1.09)
MCH RBC QN AUTO: 28 PG (ref 26.6–33)
MCHC RBC AUTO-ENTMCNC: 32.8 G/DL (ref 31.5–35.7)
MCV RBC AUTO: 85.3 FL (ref 79–97)
PLATELET # BLD AUTO: 284 10*3/MM3 (ref 140–450)
PMV BLD AUTO: 8.7 FL (ref 6–12)
POTASSIUM SERPL-SCNC: 3.7 MMOL/L (ref 3.5–5.2)
PROT SERPL-MCNC: 5.1 G/DL (ref 6–8.5)
PROTHROMBIN TIME: 17.2 SECONDS (ref 11.9–14.6)
RBC # BLD AUTO: 3.61 10*6/MM3 (ref 3.77–5.28)
RH BLD: POSITIVE
SODIUM SERPL-SCNC: 137 MMOL/L (ref 136–145)
T&S EXPIRATION DATE: NORMAL
WBC NRBC COR # BLD: 9.71 10*3/MM3 (ref 3.4–10.8)

## 2022-04-08 PROCEDURE — 25010000002 METOCLOPRAMIDE PER 10 MG: Performed by: INTERNAL MEDICINE

## 2022-04-08 PROCEDURE — 86901 BLOOD TYPING SEROLOGIC RH(D): CPT | Performed by: SPECIALIST

## 2022-04-08 PROCEDURE — 85027 COMPLETE CBC AUTOMATED: CPT | Performed by: SPECIALIST

## 2022-04-08 PROCEDURE — 86850 RBC ANTIBODY SCREEN: CPT | Performed by: SPECIALIST

## 2022-04-08 PROCEDURE — 25010000002 ERTAPENEM PER 500 MG: Performed by: INTERNAL MEDICINE

## 2022-04-08 PROCEDURE — 85610 PROTHROMBIN TIME: CPT | Performed by: SPECIALIST

## 2022-04-08 PROCEDURE — 80053 COMPREHEN METABOLIC PANEL: CPT | Performed by: SPECIALIST

## 2022-04-08 PROCEDURE — 86900 BLOOD TYPING SEROLOGIC ABO: CPT | Performed by: SPECIALIST

## 2022-04-08 RX ADMIN — METOCLOPRAMIDE HYDROCHLORIDE 5 MG: 5 INJECTION INTRAMUSCULAR; INTRAVENOUS at 05:07

## 2022-04-08 RX ADMIN — PANTOPRAZOLE SODIUM 40 MG: 40 INJECTION, POWDER, FOR SOLUTION INTRAVENOUS at 08:44

## 2022-04-08 RX ADMIN — FLUTICASONE PROPIONATE 2 SPRAY: 50 SPRAY, METERED NASAL at 20:41

## 2022-04-08 RX ADMIN — METOCLOPRAMIDE HYDROCHLORIDE 5 MG: 5 INJECTION INTRAMUSCULAR; INTRAVENOUS at 17:23

## 2022-04-08 RX ADMIN — Medication 2 SPRAY: at 20:41

## 2022-04-08 RX ADMIN — METOCLOPRAMIDE HYDROCHLORIDE 5 MG: 5 INJECTION INTRAMUSCULAR; INTRAVENOUS at 20:39

## 2022-04-08 RX ADMIN — METOCLOPRAMIDE HYDROCHLORIDE 5 MG: 5 INJECTION INTRAMUSCULAR; INTRAVENOUS at 11:19

## 2022-04-08 RX ADMIN — NEOMYCIN SULFATE, POLYMYXIN B SULFATE AND HYDROCORTISONE 4 DROP: 10; 3.5; 1 SUSPENSION/ DROPS AURICULAR (OTIC) at 11:20

## 2022-04-08 RX ADMIN — ERTAPENEM SODIUM 1 G: 1 INJECTION, POWDER, LYOPHILIZED, FOR SOLUTION INTRAMUSCULAR; INTRAVENOUS at 14:53

## 2022-04-08 RX ADMIN — NEOMYCIN SULFATE, POLYMYXIN B SULFATE AND HYDROCORTISONE 4 DROP: 10; 3.5; 1 SUSPENSION/ DROPS AURICULAR (OTIC) at 05:06

## 2022-04-08 RX ADMIN — PANTOPRAZOLE SODIUM 40 MG: 40 INJECTION, POWDER, FOR SOLUTION INTRAVENOUS at 20:39

## 2022-04-08 RX ADMIN — Medication 10 ML: at 20:40

## 2022-04-08 RX ADMIN — NEOMYCIN SULFATE, POLYMYXIN B SULFATE AND HYDROCORTISONE 4 DROP: 10; 3.5; 1 SUSPENSION/ DROPS AURICULAR (OTIC) at 23:07

## 2022-04-08 NOTE — PLAN OF CARE
Goal Outcome Evaluation:  Plan of Care Reviewed With: other (see comments)      Progress: no change  Outcome Evaluation: Ntn follow up. Pt NPO/Clear x 4 days. If pt to remain NPO >5 days may benefit from alternate means of nutrition support if clinically appropriate. Cont to follow for plan of care.

## 2022-04-08 NOTE — PLAN OF CARE
Goal Outcome Evaluation:  Plan of Care Reviewed With: patient        Progress: no change  Outcome Evaluation: Pt A&Ox3. Forgetful. Very [place]. Bedrest. Purewick. General rash. IVF and abx cont per orders. Tele. Pt will not use call light. Turned Q2. Safety maintained. Will cont to monitor.

## 2022-04-08 NOTE — PAYOR COMM NOTE
"FROM: MARJORIE RITCHIE  PHONE: [phone]  FAX: [phone]    AUTH: [id number]    Namita Cooper MARCELO (46 y.o. Female)             Date of Birth   1975    Social Security Number       Address   [address] [place], [place] [address]    Home Phone   [phone]    MRN   [id number]       Taoist   Faith    Marital Status                               Admission Date   4/2/22    Admission Type   Emergency    Admitting Provider   Fermin Mcclure DO    Attending Provider   Fermin Mcclure DO    Department, Room/Bed   [facility] 3A, 346/1       Discharge Date       Discharge Disposition       Discharge Destination                               Attending Provider: Fermin Mcclure DO    Allergies: Coconut, Nuts, Penicillins, [place]    Isolation: Contact   Infection: COVID (History) (09/15/21), ESBL Klebsiella (04/07/22)   Code Status: CPR   Advance Care Planning Activity    Ht: 170.2 cm (67\")   Wt: 72.2 kg (159 lb 3.2 oz)    Admission Cmt: None   Principal Problem: Intractable nausea and vomiting [R11.2]                 Active Insurance as of 4/2/2022     Primary Coverage     Payor Plan Insurance Group Employer/Plan Group    ANTHEM BLUE CROSS ANTHEM BLUE CROSS BLUE SHIELD PPO [id number]     Payor Plan Address Payor Plan Phone Number Payor Plan Fax Number Effective Dates    [address] [phone]  1/1/2022 - None Entered    [facility] [id number]       Subscriber Name Subscriber Birth Date Member ID       BRANDON COOPER GEMA 1975 [id number]           Secondary Coverage     Payor Plan Insurance Group Employer/Plan Group    MEDICARE MEDICARE A & B      Payor Plan Address Payor Plan Phone Number Payor Plan Fax Number Effective Dates    [address] [phone]  7/1/2019 - None Entered    [facility] [id number]       Subscriber Name Subscriber Birth Date Member ID       NAMITA COOPER MARCELO 1975 [id number]                 Emergency Contacts      (Rel.) Home Phone Work " Phone Mobile Phone    Grant Zabala (Spouse) -- -- [phone]    Noel Johnson (Father) [phone] -- [phone]    Marquita Johnson (Mother) -- -- [phone]    Neida Zabala -- -- [phone]              Facility-Administered Medications as of 4/8/2022   Medication Dose Route Frequency Provider Last Rate Last Admin   • butalbital-acetaminophen-caffeine (FIORICET, ESGIC) -40 MG per tablet 2 tablet  2 tablet Oral Q4H PRN Frank Ricks MD       • [COMPLETED] cefTRIAXone (ROCEPHIN) in SWFI 1 gram/10ml IV PUSH syringe  1 g Intravenous Once Abisai Becerra PA-C   1 g at 04/03/22 0014   • [COMPLETED] diphenhydrAMINE (BENADRYL) injection 25 mg  25 mg Intravenous Once Frank Ricks MD   25 mg at 04/06/22 0655   • droperidol (INAPSINE) injection 1.25 mg  1.25 mg Intravenous Q6H PRN Frank Ricks MD       • ertapenem (INVanz) 1 g in sodium chloride 0.9 % 100 mL IVPB-VTB  1 g Intravenous Q24H Fermin Mcclure  mL/hr at 04/07/22 1518 1 g at 04/07/22 1518   • FLUoxetine (PROzac) capsule 40 mg  40 mg Oral Daily Frank Ricks MD   40 mg at 04/06/22 1233   • fluticasone (FLONASE) 50 MCG/ACT nasal spray 2 spray  2 spray Each Nare BID Frank Ricks MD   2 spray at 04/07/22 2028   • [COMPLETED] iopamidol (ISOVUE-300) 61 % injection 100 mL  100 mL Intravenous Once in imaging Abisai Becerra PA-C   100 mL at 04/02/22 2215   • [COMPLETED] iopamidol (ISOVUE-370) 76 % injection 100 mL  100 mL Intravenous Once in imaging Fermin Mcclure DO   100 mL at 04/07/22 1502   • [COMPLETED] lactated ringers bolus 500 mL  500 mL Intravenous Once Abisai Becerra PA-C   Stopped at 04/03/22 0010   • lactated ringers infusion  75 mL/hr Intravenous Continuous Fermin Mcclure DO 75 mL/hr at 04/07/22 1518 75 mL/hr at 04/07/22 1518   • [COMPLETED] magnesium sulfate 2g/50 mL (PREMIX) infusion  2 g Intravenous Once Frank Ricks MD   2 g at 04/03/22 0523   • methocarbamol (ROBAXIN) tablet 500 mg  500 mg  Oral BID PRN Frank Ricks MD       • metoclopramide (REGLAN) injection 5 mg  5 mg Intravenous Q6H Fermin Mcclure DO   5 mg at 22 0507   • Morphine sulfate (PF) injection 1 mg  1 mg Intravenous Q4H PRN Fermin Mcclure DO   1 mg at 22 0904   • neomycin-polymyxin-hydrocortisone (CORTISPORIN) otic suspension 4 drop  4 drop Both Ears Q6H Fermin Mcclure DO   4 drop at 22 0506   • [COMPLETED] ondansetron (ZOFRAN) injection 4 mg  4 mg Intravenous Once Abisai Becerra PA-C   4 mg at 22 2244   • ondansetron (ZOFRAN) tablet 4 mg  4 mg Oral Q6H PRN Frank Ricks MD        Or   • ondansetron (ZOFRAN) injection 4 mg  4 mg Intravenous Q6H PRN Frank Ricks MD   4 mg at 22 0904   • oxymetazoline (AFRIN) nasal spray 2 spray  2 spray Nasal BID Frank Ricks MD   2 spray at 22   • pantoprazole (PROTONIX) injection 40 mg  40 mg Intravenous Q12H Frank Ricks MD   40 mg at 22 0844   • [COMPLETED] potassium chloride 10 mEq in 100 mL IVPB  10 mEq Intravenous Once Abisai Becerra PA-C   Stopped at 22 0028   • [COMPLETED] potassium chloride 10 mEq in 100 mL IVPB  10 mEq Intravenous Once Abisai Becerra PA-C   Stopped at 22 0200   • [] potassium chloride 10 mEq in 100 mL IVPB  10 mEq Intravenous Q1H Frank Ricks  mL/hr at 22 1219 10 mEq at 22 1219   • [COMPLETED] potassium chloride 10 mEq in 100 mL IVPB  10 mEq Intravenous Q2H Fermin Mcclure DO   Stopped at 22 2044   • [COMPLETED] Potassium Phosphates 30 mmol in sodium chloride 0.9 % 500 mL infusion  30 mmol Intravenous Once Fermin Mcclure DO   30 mmol at 22 1620   • [COMPLETED] Potassium Phosphates 30 mmol in sodium chloride 0.9 % 500 mL infusion  30 mmol Intravenous Once Fermin Mcclure, DO   30 mmol at 22 1123   • prochlorperazine (COMPAZINE) tablet 10 mg  10 mg Oral Q8H PRN Frank Ricks MD       • promethazine (PHENERGAN) tablet 25 mg  25  mg Oral Q6H PRN Frank Ricks MD   25 mg at 04/07/22 0314   • sodium bicarbonate tablet 650 mg  650 mg Oral BID Fermin Mcclure DO   650 mg at 04/06/22 1233   • sodium chloride 0.9 % flush 10 mL  10 mL Intravenous PRN Frank Ricks MD       • sodium chloride 0.9 % flush 10 mL  10 mL Intravenous PRN Frank Ricks MD       • sodium chloride 0.9 % flush 10 mL  10 mL Intravenous PRN Frank Ricks MD       • sodium chloride 0.9 % flush 10 mL  10 mL Intravenous Q12H Frank Ricks MD   10 mL at 04/07/22 0909   • sodium chloride 0.9 % flush 10 mL  10 mL Intravenous PRN Frank Ricks MD       • [COMPLETED] sodium phosphates 15 mmol in sodium chloride 0.9 % 250 mL IVPB  15 mmol Intravenous Once Fermin Mcclure DO 62.5 mL/hr at 04/07/22 0909 15 mmol at 04/07/22 0909   • SUMAtriptan (IMITREX) tablet 50 mg  50 mg Oral Once Frank Ricks MD       • [COMPLETED] technetium Tc 99m mebrofenin (CHOLETEC) injection 1 dose  1 dose Intravenous Once in imaging Fermin Mcclure DO   1 dose at 04/06/22 1005   • traZODone (DESYREL) tablet 100 mg  100 mg Oral Nightly Frank Ricks MD   100 mg at 04/05/22 2027   • zolpidem (AMBIEN) tablet 5 mg  5 mg Oral Nightly PRN Frank Ricks MD         Lab Results (last 48 hours)     Procedure Component Value Units Date/Time    Comprehensive Metabolic Panel [[id number]]  (Abnormal) Collected: 04/08/22 0618    Specimen: Blood Updated: 04/08/22 0717     Glucose 82 mg/dL      BUN 7 mg/dL      Creatinine 0.77 mg/dL      Sodium 137 mmol/L      Potassium 3.7 mmol/L      Chloride 107 mmol/L      CO2 19.0 mmol/L      Calcium 8.5 mg/dL      Total Protein 5.1 g/dL      Albumin 2.50 g/dL      ALT (SGPT) 12 U/L      AST (SGOT) 25 U/L      Alkaline Phosphatase 97 U/L      Total Bilirubin 0.4 mg/dL      Globulin 2.6 gm/dL      A/G Ratio 1.0 g/dL      BUN/Creatinine Ratio 9.1     Anion Gap 11.0 mmol/L      eGFR 97.7 mL/min/1.73      Comment: National Kidney Foundation and  American Society of Nephrology (ASN) Task Force recommended calculation based on the Chronic Kidney Disease Epidemiology Collaboration (CKD-EPI) equation refit without adjustment for race.       Narrative:      GFR Normal >60  Chronic Kidney Disease <60  Kidney Failure <15      Protime-INR [[id number]]  (Abnormal) Collected: 04/08/22 0618    Specimen: Blood Updated: 04/08/22 0648     Protime 17.2 Seconds      INR 1.46    CBC (No Diff) [[id number]]  (Abnormal) Collected: 04/08/22 0618    Specimen: Blood Updated: 04/08/22 0644     WBC 9.71 10*3/mm3      RBC 3.61 10*6/mm3      Hemoglobin 10.1 g/dL      Hematocrit 30.8 %      MCV 85.3 fL      MCH 28.0 pg      MCHC 32.8 g/dL      RDW 15.7 %      RDW-SD 49.1 fl      MPV 8.7 fL      Platelets 284 10*3/mm3     Blood Culture - Blood, Hand, Left [[id number]]  (Normal) Collected: 04/02/22 2235    Specimen: Blood from Hand, Left Updated: 04/07/22 2303     Blood Culture No growth at 5 days    Urine Culture - Urine, Urine, Catheter [[id number]]  (Abnormal)  (Susceptibility) Collected: 04/02/22 2221    Specimen: Urine, Catheter Updated: 04/07/22 1226     Urine Culture >100,000 CFU/mL Proteus mirabilis      >100,000 CFU/mL Klebsiella oxytoca ESBL     Comment: Consider infectious disease consult.  Susceptibility results may not correlate to clinical outcomes.       Susceptibility      Proteus mirabilis      SANDEEP      Ampicillin Resistant     Ampicillin + Sulbactam Intermediate      Cefazolin Susceptible      Cefepime Susceptible      Ceftazidime Susceptible      Ceftriaxone Susceptible      Gentamicin Susceptible      Levofloxacin Resistant     Nitrofurantoin Resistant     Piperacillin + Tazobactam Susceptible      Trimethoprim + Sulfamethoxazole Resistant                   Linear View               Susceptibility      Klebsiella oxytoca ESBL      SANDEEP      Amikacin Susceptible      Ertapenem Susceptible      Gentamicin Resistant     Levofloxacin Intermediate      Meropenem Susceptible       Nitrofurantoin Intermediate      Piperacillin + Tazobactam Susceptible      Tobramycin Intermediate      Trimethoprim + Sulfamethoxazole Susceptible                    Linear View                   Phosphorus [[id number]]  (Abnormal) Collected: 04/07/22 0713    Specimen: Blood Updated: 04/07/22 0800     Phosphorus 1.9 mg/dL     Comprehensive Metabolic Panel [[id number]]  (Abnormal) Collected: 04/07/22 0713    Specimen: Blood Updated: 04/07/22 0750     Glucose 76 mg/dL      BUN 6 mg/dL      Creatinine 0.76 mg/dL      Sodium 139 mmol/L      Potassium 5.0 mmol/L      Comment: Slight hemolysis detected by analyzer. Results may be affected.        Chloride 110 mmol/L      CO2 17.0 mmol/L      Calcium 8.2 mg/dL      Total Protein 4.7 g/dL      Albumin 2.40 g/dL      ALT (SGPT) 12 U/L      AST (SGOT) 25 U/L      Alkaline Phosphatase 91 U/L      Total Bilirubin 0.3 mg/dL      Globulin 2.3 gm/dL      A/G Ratio 1.0 g/dL      BUN/Creatinine Ratio 7.9     Anion Gap 12.0 mmol/L      eGFR 99.2 mL/min/1.73      Comment: National Kidney Foundation and American Society of Nephrology (ASN) Task Force recommended calculation based on the Chronic Kidney Disease Epidemiology Collaboration (CKD-EPI) equation refit without adjustment for race.       Narrative:      GFR Normal >60  Chronic Kidney Disease <60  Kidney Failure <15      Magnesium [[id number]]  (Normal) Collected: 04/07/22 0713    Specimen: Blood Updated: 04/07/22 0750     Magnesium 1.7 mg/dL     CBC (No Diff) [[id number]]  (Abnormal) Collected: 04/07/22 0713    Specimen: Blood Updated: 04/07/22 0729     WBC 7.85 10*3/mm3      RBC 3.78 10*6/mm3      Hemoglobin 10.6 g/dL      Hematocrit 32.3 %      MCV 85.4 fL      MCH 28.0 pg      MCHC 32.8 g/dL      RDW 15.8 %      RDW-SD 49.0 fl      MPV 9.0 fL      Platelets 255 10*3/mm3           Imaging Results (Last 48 Hours)     Procedure Component Value Units Date/Time    CT Abdomen Pelvis With Contrast [[id number]] Collected: 04/07/22  1628     Updated: 04/07/22 1641    Narrative:      EXAMINATION: CT ABDOMEN PELVIS W CONTRAST- 4/7/2022 4:28 PM CDT     HISTORY: hx bladder repair, cholecystitis; E87.6-Hypokalemia;  N39.0-Urinary tract infection, site not specified; R31.9-Hematuria,  unspecified; R11.2-Nausea with vomiting, unspecified; E87.2-Acidosis     DOSE: 268 mGycm (Automatic exposure control technique was implemented in  an effort to keep the radiation dose as low as possible without  compromising image quality)     REPORT: Spiral CT of the abdomen and pelvis was performed after  administration of intravenous contrast from the lung bases through the  pubic symphysis. Reconstructed coronal and sagittal images are also  reviewed.     COMPARISON: CT abdomen pelvis with contrast 4/2/2022.     Review of lung windows demonstrates a small left pleural effusion, there  is hazy airspace opacity in both lung bases, mostly dependent and felt  to be related to atelectasis. No consolidation is identified. No  pneumothorax is seen. There is decreased attenuation of the liver  parenchyma diffusely compatible with steatosis. The gallbladder is  mildly distended. No gallstones are visualized. The spleen is  homogeneous, normal in size. There is moderate distention of the stomach  with gas and some oral contrast is present in the fundus. The pancreas  and adrenal glands are within normal limits. The kidneys enhance  normally, on the right, there is no hydronephrosis in the ureters are  decompressed. On the left, there is an indwelling percutaneous  nephroureterostomy tube, which appears to be satisfactory position as  before. There is mild left-sided hydronephrosis, this is improved. There  is less urothelial thickening involving the left renal pelvis. A small  volume of air is noted in the nondependent bladder. There is fluid  within the upper vagina, where previously a small amount of gas was  visualized. No free fluid or free air is identified. No  intra-abdominal  abscess is identified. There is distortion of the abdominal wall in the  pelvis as before likely related to previous surgery, with scar tissue.  Bowel loops are normal caliber, based on the volume of gas within the GI  tract, mild ileus may be present. There is no bowel wall thickening.  Review of bone windows is unremarkable.       Impression:      1. There is persistent mild but improved urothelial thickening involving  the left renal pelvis and proximal left ureter, which may be related to  resolving UTI. The internal/external left-sided nephroureterostomy tube  appears in satisfactory position. There is mild left-sided  hydronephrosis which is decreased. Small amount gas is noted in the  bladder. No significant bladder wall thickening is identified.  2. Small left pleural effusion. Increased hazy airspace opacities in the  lung bases favored to represent diffuse atelectasis. Based on the volume  of gas within the GI tract, mild ileus may be present. There is no  evidence of bowel obstruction.  3. Small amount fluid within the upper vagina, where previously there  was a small amount of gas.  4. Hepatic steatosis.        This report was finalized on 04/07/2022 16:38 by Dr. Florian Laurent MD.    CT Angiogram Chest With & Without Contrast [[id number]] Collected: 04/07/22 1624     Updated: 04/07/22 1634    Narrative:      EXAM: CT ANGIOGRAM CHEST W WO CONTRAST-     INDICATION: History PEs due to COVID; E87.6-Hypokalemia; N39.0-Urinary  tract infection, site not specified; R31.9-Hematuria, unspecified;  R11.2-Nausea with vomiting, unspecified; E87.2-Acidosis     COMPARISON: 9/20/2021     DLP: 593 mGy cm. Automated exposure control was also utilized to  decrease patient radiation dose.     FINDINGS:     No evidence of pulmonary embolus. Main pulmonary artery is nondilated.  Thoracic aorta is nonaneurysmal. No evidence of aortic dissection. Trace  pericardial fluid. Heart is mildly enlarged.     Central  airways are clear. Bilateral bandlike areas of reticulation  likely representing scarring/fibrosis from prior Covid 19 infection.  Small LEFT pleural effusion. No definite focal area of consolidation. No  suspicious pulmonary nodule. No pneumothorax. No enlarged axillary,  supraclavicular, mediastinal, or hilar lymph nodes.     No acute chest wall soft tissue abnormality. Findings in the included  upper abdomen will be described in a separate same-day report. No acute  osseous finding.       Impression:         1.  No evidence of pulmonary embolus.     2.  Small LEFT pleural effusion.     3.  Bandlike areas of reticulation throughout both lungs likely  representing scarring/fibrosis related to prior Covid 19 infection.     4.  Findings in the upper abdomen will be described in a separate  report.  This report was finalized on 04/07/2022 16:31 by Dr. Gomez Cárdenas MD.    NM HIDA SCAN WITHOUT PHARMACOLOGICAL INTERVENTION [[id number]] Collected: 04/06/22 1313     Updated: 04/06/22 1318    Narrative:      EXAMINATION:   NM HIDA SCAN WITHOUT PHARMACOLOGICAL INTERVENTION-   4/6/2022 1:13 PM CDT     HISTORY: Sludge in the gallbladder NM HIDA SCAN WITHOUT PHARMACOLOGICAL  INTERVENTION- 4/6/2022 10:16 AM CDT     Clinical History: Persistent nausea and vomiting, sludge-filled  gallbladder; E87.6-Hypokalemia; N39.0-Urinary tract infection, site not  specified; R31.9-Hematuria, unspecified; R11.2-Nausea with vomiting,  unspecified; E87.2-Acidosis     Comparison: None.      Radiopharmaceutical: 4.3 mCi technetium 99m Mebrofenin IV.      Technique: Anterior images were acquired over the upper abdomen for a  period of 60 minutes following intravenous administration of the  radiopharmaceutical.      Subsequently, the patient ingested a 1 ounce mixture of corn oil  emulsion, and additional images of the abdomen were obtained for 60  minutes. Gallbladder ejection fraction was calculated.      Findings:    There is prompt progression  of the isotope from the liver into the  intrahepatic biliary ducts, common bile duct and gallbladder.  Radiotracer is noted in the small bowel at the end of 60 minutes.      Following ingestion of the corn oil emulsion, calculated gallbladder  ejection fraction is 20% (normal > 20%). There is further radiotracer  accumulation in the small bowel.        Impression:      Impression:   1. 20% biliary ejection fraction.     This report was finalized on 04/06/2022 13:15 by Dr. Nadeem Marshall MD.        ECG/EMG Results (last 48 hours)     ** No results found for the last 48 hours. **        Orders (last 48 hrs)      Start     Ordered    04/08/22 0800  Daily Weights  Daily      Comments: Standing scale preferred.  Weights per report are fluctuating.    04/07/22 1906    04/08/22 0600  Type & Screen  Morning Draw         04/07/22 1409    04/08/22 0600  CBC (No Diff)  Daily       04/07/22 1409    04/08/22 0600  Comprehensive Metabolic Panel  Morning Draw         04/07/22 1409    04/08/22 0600  Protime-INR  Morning Draw         04/07/22 1409    04/08/22 0001  NPO Diet  Diet Effective Midnight         04/07/22 1346    04/07/22 1600  iopamidol (ISOVUE-370) 76 % injection 100 mL  Once in Imaging         04/07/22 1509    04/07/22 1500  lactated ringers infusion  Continuous         04/07/22 1402    04/07/22 1445  sodium chloride 0.9 % infusion  Continuous,   Status:  Discontinued         04/07/22 1348    04/07/22 1348  CT Angiogram Chest With & Without Contrast  1 Time Imaging         04/07/22 1348    04/07/22 1347  Diet Clear Liquid  Diet Effective Now,   Status:  Canceled         04/07/22 1346    04/07/22 1230  iopamidol (ISOVUE-300) 61 % injection 50 mL  Once in Imaging,   Status:  Discontinued         04/07/22 1137    04/07/22 0900  sodium phosphates 15 mmol in sodium chloride 0.9 % 250 mL IVPB  Once         04/07/22 0811    04/07/22 0626  CT Chest With Contrast Diagnostic  1 Time Imaging,   Status:  Canceled         04/07/22  0626    04/07/22 0626  CT Abdomen Pelvis With Contrast  1 Time Imaging         04/07/22 0626    04/07/22 0600  CBC (No Diff)  Morning Draw         04/06/22 1311    04/07/22 0600  Comprehensive Metabolic Panel  Morning Draw         04/06/22 1311    04/07/22 0600  Magnesium  Morning Draw         04/06/22 1311    04/07/22 0600  Phosphorus  Morning Draw         04/06/22 1311    04/07/22 0001  NPO Diet  Diet Effective Midnight,   Status:  Canceled         04/06/22 1330    04/06/22 1620  Morphine sulfate (PF) injection 1 mg  Every 4 Hours PRN         04/06/22 1620    04/06/22 1331  Diet Clear Liquid  Diet Effective Now,   Status:  Canceled         04/06/22 1330    04/06/22 1330  Inpatient ENT Consult  Once        Specialty:  Otolaryngology  Provider:  Manny Nichols MD    04/06/22 1329    04/06/22 1329  Hold Medication Lovenox  Once,   Status:  Canceled         04/06/22 1328    04/06/22 1321  Inpatient General Surgery Consult  Once        Specialty:  General Surgery  Provider:  Tiffanie Scott MD    04/06/22 1320    04/06/22 1005  technetium Tc 99m mebrofenin (CHOLETEC) injection 1 dose  Once in Imaging         04/06/22 1014    04/06/22 0945  NM HIDA SCAN WITHOUT PHARMACOLOGICAL INTERVENTION  1 Time Imaging         04/05/22 1423    04/05/22 1800  enoxaparin (LOVENOX) syringe 60 mg  Every 12 Hours,   Status:  Discontinued         04/05/22 1420    04/05/22 1130  sodium bicarbonate tablet 650 mg  2 Times Daily         04/05/22 1037    04/04/22 1700  metoclopramide (REGLAN) injection 5 mg  Every 6 Hours         04/04/22 1208    04/03/22 2100  traZODone (DESYREL) tablet 100 mg  Nightly         04/03/22 0411    04/03/22 1530  ertapenem (INVanz) 1 g in sodium chloride 0.9 % 100 mL IVPB-VTB  Every 24 Hours         04/03/22 1444    04/03/22 1445  neomycin-polymyxin-hydrocortisone (CORTISPORIN) otic suspension 4 drop  Every 6 Hours Scheduled         04/03/22 1356    04/03/22 0900  FLUoxetine (PROzac) capsule 40 mg  Daily      "    04/03/22 0411    04/03/22 0900  sodium chloride 0.9 % flush 10 mL  Every 12 Hours Scheduled         04/03/22 0411    04/03/22 0900  fluticasone (FLONASE) 50 MCG/ACT nasal spray 2 spray  2 Times Daily         04/03/22 0435    04/03/22 0800  Vital Signs  Every 4 Hours       04/03/22 0411    04/03/22 0600  Incentive Spirometry  Every 4 Hours While Awake       04/03/22 0411 04/03/22 0530  pantoprazole (PROTONIX) injection 40 mg  Every 12 Hours Scheduled         04/03/22 0435    04/03/22 0530  oxymetazoline (AFRIN) nasal spray 2 spray  2 Times Daily         04/03/22 0435    04/03/22 0500  SUMAtriptan (IMITREX) tablet 50 mg  Once         04/03/22 0411    04/03/22 0409  droperidol (INAPSINE) injection 1.25 mg  Every 6 Hours PRN         04/03/22 0411    04/03/22 0409  ondansetron (ZOFRAN) tablet 4 mg  Every 6 Hours PRN        \"Or\" Linked Group Details    04/03/22 0411    04/03/22 0409  ondansetron (ZOFRAN) injection 4 mg  Every 6 Hours PRN        \"Or\" Linked Group Details    04/03/22 0411    04/03/22 0406  Intake & Output  Every Shift       04/03/22 0411    04/03/22 0405  sodium chloride 0.9 % flush 10 mL  As Needed         04/03/22 0411    04/03/22 0404  promethazine (PHENERGAN) tablet 25 mg  Every 6 Hours PRN         04/03/22 0411    04/03/22 0404  prochlorperazine (COMPAZINE) tablet 10 mg  Every 8 Hours PRN         04/03/22 0411    04/03/22 0403  methocarbamol (ROBAXIN) tablet 500 mg  2 Times Daily PRN         04/03/22 0411    04/03/22 0403  zolpidem (AMBIEN) tablet 5 mg  Nightly PRN         04/03/22 0411    04/03/22 0403  butalbital-acetaminophen-caffeine (FIORICET, ESGIC) -40 MG per tablet 2 tablet  Every 4 Hours PRN         04/03/22 0411    04/02/22 2122  sodium chloride 0.9 % flush 10 mL  As Needed        \"And\" Linked Group Details    04/02/22 2123 04/02/22 2110  sodium chloride 0.9 % flush 10 mL  As Needed        \"And\" Linked Group Details    04/02/22 2110 04/02/22 2053  sodium chloride 0.9 % " flush 10 mL  As Needed         22    --  metoclopramide (REGLAN) 5 MG/5ML solution  3 Times Daily Before Meals         22    --  rizatriptan (MAXALT) 10 MG tablet  Once As Needed         22    --  butalbital-acetaminophen-caffeine (FIORICET, ESGIC) -40 MG per tablet  Every 4 Hours PRN         22    --  ondansetron ODT (ZOFRAN-ODT) 4 MG disintegrating tablet  4 Times Daily PRN         22    --  eszopiclone (LUNESTA) 3 MG tablet  Nightly         22    --  traZODone (DESYREL) 100 MG tablet  Nightly         22    --  promethazine (PHENERGAN) 25 MG tablet  Every 6 Hours PRN         22    --  methocarbamol (ROBAXIN) 500 MG tablet  3 Times Daily PRN         22    --  prochlorperazine (COMPAZINE) 10 MG tablet  Every 8 Hours PRN         22    --  FLUoxetine (PROzac) 40 MG capsule  Daily         22    --  rivaroxaban (XARELTO) 20 MG tablet  Nightly         22    --  oxymetazoline (AFRIN) 0.05 % nasal spray  2 Times Daily         22    --  SCANNED - TELEMETRY           22 0000    --  Calcium Carbonate-Simethicone 750-80 MG chewable tablet  Daily         22 1548    --  pantoprazole (PROTONIX) 20 MG EC tablet  Daily         22 1602                 Physician Progress Notes (last 48 hours)      Manny Nichols MD at 22 1613          [facility]  ENT PROGRESS NOTES  2022      Patient Identification:  Name: Namita Zabala  Age: 46 y.o.  Sex: female  :  1975  MRN: [id number]                     Date of Admission: 2022      CC:    Subjective hearing loss  Subjective   Patient with history of ear pain and subjective hearing loss    HISTORY   HPI, ROS, PMFSHx reviewed:   No changes      Objective     PE:    Temp:  [97 °F (36.1 °C)-98.2 °F (36.8 °C)] 97.4 °F (36.3 °C)  Heart Rate:  [100-108] 108  Resp:  [16-18] 16  BP:  (122-135)/(78-85) 124/80   Body mass index is 24.35 kg/m².     General appearance: acyanotic, in no respiratory distress.   Ability to Communicate:    Facial exam: no craniofacial deformities   Ears - right ear normal, left ear normal.   Nasal exam - normal and patent, no erythema, discharge or polyps.   Oropharyngeal exam - not examined.   Neck exam - supple, no significant adenopathy.     CVS exam: not examined.   Chest: no tachypnea, retractions or cyanosis.   No lymphadenopathy in the anterior or posterior neck, supraclavicular areas.   Neurological exam reveals neck supple without rigidity.    DATA      MEDICATIONS   I have reviewed the current MAR.     LABS AND IMAGING      I have reviewed the labs and imaging data since the patient was last seen.      Assessment     ASSESSMENT       Intractable nausea and vomiting    Sepsis secondary to UTI (HCC)    Lactic acidosis    Hypokalemia    Essential (primary) hypertension    UTI (urinary tract infection), bacterial    Malfunction of nephrostomy tube (HCC)    Severe malnutrition (CMS/HCC)    Hypophosphatemia    Hearing loss    TMJ     Plan     PLAN     -Discussed with Dr. Mcclure possibility of transporting the patient for audiometric evaluation in the office and he agreed -That the patient can tolerate transport  -Audio tomorrow 11:00 AM  -Further recommendations to follow-         Manny Nichols MD  4/7/2022  16:16 CDT      Electronically signed by Manny Nichols MD at 04/07/22 6166     Fermin Mcclure DO at 04/07/22 1359              [facility] Medicine Services  INPATIENT PROGRESS NOTE    Patient Name: Namita Zabala  Date of Admission: 4/2/2022  Today's Date: 04/07/22  Length of Stay: 3  Primary Care Physician: David Lara MD    Subjective   Chief Complaint: Persistent nausea.   HPI  Dr. Damon and I discussed her case.  She is interested in taking her for laparoscopic cholecystectomy, but wants to  obtain a chest, abdomen, and pelvis CT to reevaluate her previous issues from urologic standpoint and also whether or not she has any active pulmonary emboli.  She also asked that I hold the patient's anticoagulation completely for right now.  Her last dose of therapeutic Lovenox was yesterday at 1233.  Her last dose of Xarelto was on the evening of 4/4.      Dr. Nichols and I discussed her case.  He is going to bring her to the office today to do audiograms.    Her , Grant, called me back yesterday and we discussed her case.      No new complaints. Awaiting assistance from consultants.     Review of Systems  All pertinent negatives and positives are as above. All other systems have been reviewed and are negative unless otherwise stated.     Objective    Temp:  [97 °F (36.1 °C)-98.6 °F (37 °C)] 97.4 °F (36.3 °C)  Heart Rate:  [100-112] 100  Resp:  [16-18] 16  BP: (122-135)/(78-86) 135/83  Physical Exam  Constitutional:       Appearance: She is well-developed.      Comments: Up in bed.  Awoken from sleep.  No distress.  No family present.  Discussed with her nurse, Bora.   HENT:      Head: Normocephalic and atraumatic.   Eyes:      Conjunctiva/sclera: Conjunctivae normal.      Pupils: Pupils are equal, round, and reactive to light.   Neck:      Vascular: No JVD.   Cardiovascular:      Rate and Rhythm: Normal rate and regular rhythm.      Heart sounds: Normal heart sounds.   Pulmonary:      Effort: Pulmonary effort is normal. No respiratory distress.      Breath sounds: Normal breath sounds.   Abdominal:      General: Bowel sounds are normal. There is no distension.      Palpations: Abdomen is soft.      Tenderness: There is no abdominal tenderness.   Musculoskeletal:         General: No tenderness or deformity. Normal range of motion.      Cervical back: Neck supple.   Skin:     General: Skin is warm and dry.      Findings: No rash.   Neurological:      General: No focal deficit present.      Mental Status: She  is alert and oriented to person, place, and time.      Cranial Nerves: No cranial nerve deficit.      Motor: Weakness present. No abnormal muscle tone.      Deep Tendon Reflexes: Reflexes normal.   Psychiatric:      Comments: Flat affect, drowsy today.       Results Review:  I have reviewed the labs, radiology results, and diagnostic studies.    Laboratory Data:   Results from last 7 days   Lab Units 04/07/22  0713 04/05/22  0749 04/04/22  1328   WBC 10*3/mm3 7.85 7.64 7.29   HEMOGLOBIN g/dL 10.6* 9.6* 9.6*   HEMATOCRIT % 32.3* 30.1* 29.2*   PLATELETS 10*3/mm3 255 303 293     Results from last 7 days   Lab Units 04/07/22  0713 04/05/22  0748 04/04/22 1328 04/03/22  1824 04/03/22 0713 04/02/22 2113   SODIUM mmol/L 139 141 141  --  137 137   POTASSIUM mmol/L 5.0 4.4 3.7   < > 2.5* 2.6*   CHLORIDE mmol/L 110* 112* 109*  --  97* 90*   CO2 mmol/L 17.0* 18.0* 18.0*  --  25.0 27.0   BUN mg/dL 6 7 8  --  13 13   CREATININE mg/dL 0.76 0.71 0.90  --  0.95 1.11*   CALCIUM mg/dL 8.2* 7.4* 7.8*  --  8.7 10.6*   BILIRUBIN mg/dL 0.3  --   --   --  0.6 1.2   ALK PHOS U/L 91  --   --   --  100 149*   ALT (SGPT) U/L 12  --   --   --  13 20   AST (SGOT) U/L 25  --   --   --  18 28   GLUCOSE mg/dL 76 89 115*  --  100* 97    < > = values in this interval not displayed.     Results from last 7 days   Lab Units 04/07/22 0713 04/05/22  0748 04/04/22 1328 04/02/22  2113   MAGNESIUM mg/dL 1.7  --  2.0 1.7   PHOSPHORUS mg/dL 1.9* 2.2* 1.2*  --      Culture Data:   Blood Culture   Date Value Ref Range Status   04/02/2022 No growth at 4 days  Preliminary   04/02/2022 Staphylococcus, coagulase negative (C)  Final     Urine Culture   Date Value Ref Range Status   04/02/2022 >100,000 CFU/mL Proteus mirabilis (A)  Final   04/02/2022 >100,000 CFU/mL Klebsiella oxytoca ESBL (A)  Final     Comment:     Consider infectious disease consult.  Susceptibility results may not correlate to clinical outcomes.     I have reviewed the patient's current  medications.     Assessment/Plan     Active Hospital Problems    Diagnosis    • **Intractable nausea and vomiting    • UTI (urinary tract infection), bacterial    • Sepsis secondary to UTI (HCC)    • Lactic acidosis    • Severe malnutrition (CMS/HCC)    • Hypophosphatemia    • Hypokalemia    • Malfunction of nephrostomy tube (HCC)    • Essential (primary) hypertension      Plan:  The patient was admitted on 4/3 by Dr. Ricks.  She presented with complaints of intractable nausea and vomiting.  Her medical problems started last August when she developed COVID-19 pneumonia and had numerous complications thereafter.  She developed an abdominal hematoma and had to go on dialysis secondary to extrinsic compression of her urinary system. She was transferred from here to [facility] and required exploratory laparotomy.  She ended up having bilateral percutaneous nephrostomy tubes and also ended up with a left ureteral stent.  She states that her main urologist is Dr. Rina Rey.  She states that she stopped producing urine from her percutaneous nephrostomy tube recently and that there are plans for it to be removed.  She states that she had gotten in touch with [place] to see if someone here could do it so she would not have to travel.     When she left [place], she spent considerable time at a skilled nursing facility in [place], [place].  She states that she has only been home a few weeks.  She was admitted to [facility] in [place] on 3/9 for electrolyte abnormalities.     Renal function stable yesterday with a creatinine 0.76.  Dr. Ricks began treating a urinary tract infection with ceftriaxone.  She had a multidrug-resistant Klebsiella in October 2021.  I transitioned her to [facility] on 4/3.  Lactate down to 1.7 from 2.8 after fluids.  Urine does appear infected with too numerous to count white blood cells, 4+ bacteria and large leukocyte esterase. The sample was also bloody.  Urine  "culture has grown 2 distinct pathogens.  There is a multidrug-resistant Proteus mirabilis strain and an ESBL Klebsiella strain.  Invanz should be treating both of these organisms concurrently.  Today is day 5 of Invanz.  We will treat 7-10 days.  Lactic acidosis improved after fluids and antibiotics.  Procalcitonin elevated at 0.31.  Dr. Dyer is familiar with her previous problems as he saw her last year.  I consulted him to evaluate her.  He wants to treat her current infection and then have her follow back at [place].     Blood cultures contaminated with coagulase-negative Staphylococcus.    Replaced potassium aggressively. Multiple runs ordered and it is also in her fluids. \"I have always had potassium problems.\"  Potassium now 5.0.  Recent replacement of phosphorus and potassium with IV potassium phosphate.  Will replace phosphorus today with IV sodium phosphate.  Started oral sodium bicarbonate on 4/5.     Dr. Ricks asked for a GI opinion.  Dr. Babin saw and recommended an abdominal ultrasound.  There is a sludge-filled gallbladder with no significant gallbladder wall thickening, pericholecystic fluid, or convincing evidence of acute cholecystitis.  Mild dilatation of the common hepatic duct with no visualized choledocholithiasis. HIDA scan shows an EF of 20%. Consulted general surgery to consider laparoscopic cholecystectomy given her sludge-filled gallbladder and equivocal ejection fraction.  Dr. Alicia Damon is interested in doing this, but would like to obtain new CT scans today and also have her off anticoagulation a bit longer.     She has been complaining of sinus congestion and her ears have been painful. Exam was normal. Continues on flonase, corticosporin eardrops. Consulted ENT on 4/6.  Discussed with Dr. Nichols.  He is going to take her for audiograms in the office today.       She is on Xarelto for history of pulmonary embolus. Dr. Damon would like for her to be off all " anticoagulation for the time being.  She has not had Xarelto since the evening of 4/4 and has not had therapeutic Lovenox since 1233 on 4/6.    Discharge Planning: I expect the patient to be discharged to home in 2-3 days.    Electronically signed by Fermin Mcclure DO, 04/07/22, 13:55 CDT.      Electronically signed by Fermin Mcclure DO at 04/07/22 1405     Alicia Damon MD at 04/07/22 0652        Will order ct chest, abdomen, and pelvis for re-evaluation of lungs as well as gallbladder and remaining degree of inflammatory changes in the midline and bladder regions.    Electronically signed by Alicia Damon MD at 04/07/22 0652     Fermin Mcclure DO at 04/06/22 1310              [facility] Medicine Services  INPATIENT PROGRESS NOTE    Patient Name: Namita Zabala  Date of Admission: 4/2/2022  Today's Date: 04/06/22  Length of Stay: 2  Primary Care Physician: David Lara MD    Subjective   Chief Complaint: Persistent nausea.   HPI  Consulting general surgery today given the fact that her gallbladder is sludge-filled and HIDA scan also shows an EF of 20%.  She is persistently nauseous despite the treatment of her UTI.  They would likely want to wait until tomorrow to conduct an intervention because I just transitioned her to therapeutic Lovenox yesterday.  Her last dose of Xarelto was on 4/4.    Gave her a break on labs today.    Still complaining of bilateral ear pain and now saying her hearing is affected. My recent otoscopic examination was normal.     I tried to call her  Grant at [phone] to update him on her case and it went to [facility].     Review of Systems  All pertinent negatives and positives are as above. All other systems have been reviewed and are negative unless otherwise stated.     Objective    Temp:  [97.9 °F (36.6 °C)-98.4 °F (36.9 °C)] 98 °F (36.7 °C)  Heart Rate:  [106-111] 111  Resp:  [16-20] 18  BP: (112-136)/(80-98)  112/84  Physical Exam  Constitutional:       Appearance: She is well-developed.      Comments: Up in bed.  No distress.  No family present.  Discussed with her nurse, Fausto   HENT:      Head: Normocephalic and atraumatic.   Eyes:      Conjunctiva/sclera: Conjunctivae normal.      Pupils: Pupils are equal, round, and reactive to light.   Neck:      Vascular: No JVD.   Cardiovascular:      Rate and Rhythm: Normal rate and regular rhythm.      Heart sounds: Normal heart sounds. No murmur heard.    No friction rub. No gallop.   Pulmonary:      Effort: Pulmonary effort is normal. No respiratory distress.      Breath sounds: Normal breath sounds. No wheezing or rales.   Chest:      Chest wall: No tenderness.   Abdominal:      General: Bowel sounds are normal. There is no distension.      Palpations: Abdomen is soft.      Tenderness: There is no abdominal tenderness.   Musculoskeletal:         General: No tenderness or deformity. Normal range of motion.      Cervical back: Neck supple.   Skin:     General: Skin is warm and dry.      Findings: No rash.   Neurological:      General: No focal deficit present.      Mental Status: She is alert and oriented to person, place, and time.      Cranial Nerves: No cranial nerve deficit.      Motor: Weakness present. No abnormal muscle tone.      Deep Tendon Reflexes: Reflexes normal.   Psychiatric:      Comments: Flat affect, but conversational.       Results Review:  I have reviewed the labs, radiology results, and diagnostic studies.    Laboratory Data:   No new labs.    Culture Data:   Blood Culture   Date Value Ref Range Status   04/02/2022 No growth at 3 days  Preliminary   04/02/2022 Staphylococcus, coagulase negative (C)  Final     Urine Culture   Date Value Ref Range Status   04/02/2022 >100,000 CFU/mL Gram Negative Bacilli (A)  Preliminary   04/02/2022 >100,000 CFU/mL Proteus mirabilis (A)  Preliminary     Imaging Results (Last 24 Hours)     Procedure Component Value Units  Date/Time    NM HIDA SCAN WITHOUT PHARMACOLOGICAL INTERVENTION [[id number]] Collected: 04/06/22 1313     Updated: 04/06/22 1318    Narrative:      EXAMINATION:   NM HIDA SCAN WITHOUT PHARMACOLOGICAL INTERVENTION-   4/6/2022 1:13 PM CDT     HISTORY: Sludge in the gallbladder NM HIDA SCAN WITHOUT PHARMACOLOGICAL  INTERVENTION- 4/6/2022 10:16 AM CDT     Clinical History: Persistent nausea and vomiting, sludge-filled  gallbladder; E87.6-Hypokalemia; N39.0-Urinary tract infection, site not  specified; R31.9-Hematuria, unspecified; R11.2-Nausea with vomiting,  unspecified; E87.2-Acidosis     Comparison: None.      Radiopharmaceutical: 4.3 mCi technetium 99m Mebrofenin IV.      Technique: Anterior images were acquired over the upper abdomen for a  period of 60 minutes following intravenous administration of the  radiopharmaceutical.      Subsequently, the patient ingested a 1 ounce mixture of corn oil  emulsion, and additional images of the abdomen were obtained for 60  minutes. Gallbladder ejection fraction was calculated.      Findings:    There is prompt progression of the isotope from the liver into the  intrahepatic biliary ducts, common bile duct and gallbladder.  Radiotracer is noted in the small bowel at the end of 60 minutes.      Following ingestion of the corn oil emulsion, calculated gallbladder  ejection fraction is 20% (normal > 20%). There is further radiotracer  accumulation in the small bowel.        Impression:      Impression:   1. 20% biliary ejection fraction.     This report was finalized on 04/06/2022 13:15 by Dr. Nadeem Marshall MD.        I have reviewed the patient's current medications.     Assessment/Plan     Active Hospital Problems    Diagnosis    • **Intractable nausea and vomiting    • UTI (urinary tract infection), bacterial    • Sepsis secondary to UTI (HCC)    • Lactic acidosis    • Severe malnutrition (CMS/HCC)    • Hypophosphatemia    • Hypokalemia    • Malfunction of nephrostomy tube  ([facility])    • Essential (primary) hypertension      Plan:  The patient was admitted on 4/3 by Dr. Ricks.  She presented with complaints of intractable nausea and vomiting.  Her medical problems started last August when she developed COVID-19 pneumonia and had numerous complications thereafter.  She developed an abdominal hematoma and had to go on dialysis secondary to extrinsic compression of her urinary system.  She was transferred from here to [facility] and required exploratory laparotomy.  She ended up having bilateral percutaneous nephrostomy tubes and also ended up with a left ureteral stent.  She states that her main urologist is Dr. Rina Rey.  She states that she stopped producing urine from her percutaneous nephrostomy tube recently and that there are plans for it to be removed.  She states that she had gotten in touch with [place] to see if someone here could do it so she would not have to travel.     When she left [place], she spent considerable time at a skilled nursing facility in [place], [place].  She states that she has only been home a few weeks.  She was admitted to [facility] in [place] on 3/9 for electrolyte abnormalities.     Renal function stable yesterday with a creatinine 0.71.  Dr. Ricks began treating a urinary tract infection with ceftriaxone.  She had a multidrug-resistant Klebsiella in October 2021.  I transitioned her to [facility] on 4/3.  Lactate down to 1.7 from 2.8 after fluids.  Urine does appear infected with too numerous to count white blood cells, 4+ bacteria and large leukocyte esterase. The sample was also bloody.  Urine culture seems to be growing 2 different gram-negative bacilli species. One has speciated to Proteus mirabilis that is resistant to ampicillin, levofloxacin, nitrofurantoin, and Bactrim.  Will not narrow antibiotic coverage until the other species is known.  Lactic acidosis improved after fluids and antibiotics.  Procalcitonin  "elevated at 0.31.  Dr. Dyer is familiar with her previous problems as he saw her last year.  I consulted him to evaluate her.  He wants to treat her current infection and then have her follow back at [place].     Blood cultures contaminated with coagulase-negative Staphylococcus.    Replaced potassium aggressively. Multiple runs ordered and it is also in her fluids. \"I have always had potassium problems.\"  Potassium 4.4 yesterday morning.  Recent replacement of phosphorus and potassium with IV potassium phosphate.     Dr. Ricks asked for a GI opinion.  Dr. Babin saw and recommended an abdominal ultrasound.  There is a sludge-filled gallbladder with no significant gallbladder wall thickening, pericholecystic fluid, or convincing evidence of acute cholecystitis.  Mild dilatation of the common hepatic duct with no visualized choledocholithiasis. HIDA scan shows an EF of 20%.  Will consult general surgery to consider laparoscopic cholecystectomy given her sludge-filled gallbladder and equivocal ejection fraction.     She has been complaining of sinus congestion and her ears have been painful. Exam was normal. Continues on flonase, corticosporin eardrops. Will consult ENT because she is now stating that her hearing is affected.      She is on Xarelto for history of pulmonary embolus.  I stopped this medication on 4/5 and placed her on therapeutic Lovenox given the fact that she might require cholecystectomy or endoscopy in the future.    Discharge Planning: I expect the patient to be discharged to home in 2-3 days.    Electronically signed by Fermin Mcclure DO, 04/06/22, 13:10 CDT.      Electronically signed by Fermin Mcclure DO at 04/06/22 1331       "

## 2022-04-08 NOTE — PROGRESS NOTES
[facility]  INPATIENT PROGRESS NOTE    Patient Name: Namita Zabala  Date of Admission: 4/2/2022  Today's Date: 04/08/22  Length of Stay: 4  Primary Care Physician: David Lara MD    Subjective   Chief Complaint: Persistent nausea.   HPI  Wanted to sleep and did not go to Dr. Nichols' office for her audiogram at 11 AM. She also missed her appointment yesterday.  The nurses also wonder how we will get her over there.  She has refused to get into the wheelchair for other test and they are not convinced that they can get her into the office in her hospital bed.  I do not find this testing to be emergent by any means this so we will figure out a way to obtain this eventually.    Dr. Damon got new CTs yesterday.  No pulmonary embolus.  Some scarring and fibrosis from COVID-19 infection.  Urinary issues look better.  She has not had any Xarelto since 4/4 and has not had any Lovenox since 4/6.  She remains nothing by mouth at present and may go for laparoscopic cholecystectomy today.    No new complaints or major changes.  The patient continues to not participate in her own care.    Review of Systems  All pertinent negatives and positives are as above. All other systems have been reviewed and are negative unless otherwise stated.     Objective    Temp:  [97.5 °F (36.4 °C)-98.1 °F (36.7 °C)] 98.1 °F (36.7 °C)  Heart Rate:  [107-112] 112  Resp:  [16-18] 18  BP: (120-140)/(64-80) 140/78  Physical Exam  Constitutional:       Appearance: She is well-developed.      Comments: Up in bed.  Awoken from sleep.  No distress.  No family present.  Discussed with her nurse, Ivette, and the charge nurse, Virginia.   HENT:      Head: Normocephalic and atraumatic.   Eyes:      Conjunctiva/sclera: Conjunctivae normal.      Pupils: Pupils are equal, round, and reactive to light.   Neck:      Vascular: No JVD.   Cardiovascular:      Rate and Rhythm: Normal rate and  regular rhythm.      Heart sounds: Normal heart sounds.   Pulmonary:      Effort: Pulmonary effort is normal. No respiratory distress.      Breath sounds: Normal breath sounds.   Abdominal:      General: Bowel sounds are normal. There is no distension.      Palpations: Abdomen is soft.      Tenderness: There is no abdominal tenderness.   Musculoskeletal:         General: No tenderness or deformity. Normal range of motion.      Cervical back: Neck supple.   Skin:     General: Skin is warm and dry.      Findings: No rash.   Neurological:      General: No focal deficit present.      Mental Status: She is alert and oriented to person, place, and time.      Cranial Nerves: No cranial nerve deficit.      Motor: Weakness present. No abnormal muscle tone.      Deep Tendon Reflexes: Reflexes normal.   Psychiatric:      Comments: Flat affect. Seems to want to be left alone.        Results Review:  I have reviewed the labs, radiology results, and diagnostic studies.    Laboratory Data:   Results from last 7 days   Lab Units 04/08/22  0618 04/07/22  0713 04/05/22  0749   WBC 10*3/mm3 9.71 7.85 7.64   HEMOGLOBIN g/dL 10.1* 10.6* 9.6*   HEMATOCRIT % 30.8* 32.3* 30.1*   PLATELETS 10*3/mm3 284 255 303     Results from last 7 days   Lab Units 04/08/22  0618 04/07/22  0713 04/05/22  0748 04/03/22  1824 04/03/22  0713   SODIUM mmol/L 137 139 141   < > 137   POTASSIUM mmol/L 3.7 5.0 4.4   < > 2.5*   CHLORIDE mmol/L 107 110* 112*   < > 97*   CO2 mmol/L 19.0* 17.0* 18.0*   < > 25.0   BUN mg/dL 7 6 7   < > 13   CREATININE mg/dL 0.77 0.76 0.71   < > 0.95   CALCIUM mg/dL 8.5* 8.2* 7.4*   < > 8.7   BILIRUBIN mg/dL 0.4 0.3  --   --  0.6   ALK PHOS U/L 97 91  --   --  100   ALT (SGPT) U/L 12 12  --   --  13   AST (SGOT) U/L 25 25  --   --  18   GLUCOSE mg/dL 82 76 89   < > 100*    < > = values in this interval not displayed.     Imaging Results (Last 48 Hours)     Procedure Component Value Units Date/Time    CT Abdomen Pelvis With Contrast  [[id number]] Collected: 04/07/22 1628     Updated: 04/07/22 1641    Narrative:      EXAMINATION: CT ABDOMEN PELVIS W CONTRAST- 4/7/2022 4:28 PM CDT     HISTORY: hx bladder repair, cholecystitis; E87.6-Hypokalemia;  N39.0-Urinary tract infection, site not specified; R31.9-Hematuria,  unspecified; R11.2-Nausea with vomiting, unspecified; E87.2-Acidosis     DOSE: 268 mGycm (Automatic exposure control technique was implemented in  an effort to keep the radiation dose as low as possible without  compromising image quality)     REPORT: Spiral CT of the abdomen and pelvis was performed after  administration of intravenous contrast from the lung bases through the  pubic symphysis. Reconstructed coronal and sagittal images are also  reviewed.     COMPARISON: CT abdomen pelvis with contrast 4/2/2022.     Review of lung windows demonstrates a small left pleural effusion, there  is hazy airspace opacity in both lung bases, mostly dependent and felt  to be related to atelectasis. No consolidation is identified. No  pneumothorax is seen. There is decreased attenuation of the liver  parenchyma diffusely compatible with steatosis. The gallbladder is  mildly distended. No gallstones are visualized. The spleen is  homogeneous, normal in size. There is moderate distention of the stomach  with gas and some oral contrast is present in the fundus. The pancreas  and adrenal glands are within normal limits. The kidneys enhance  normally, on the right, there is no hydronephrosis in the ureters are  decompressed. On the left, there is an indwelling percutaneous  nephroureterostomy tube, which appears to be satisfactory position as  before. There is mild left-sided hydronephrosis, this is improved. There  is less urothelial thickening involving the left renal pelvis. A small  volume of air is noted in the nondependent bladder. There is fluid  within the upper vagina, where previously a small amount of gas was  visualized. No free fluid or free  air is identified. No intra-abdominal  abscess is identified. There is distortion of the abdominal wall in the  pelvis as before likely related to previous surgery, with scar tissue.  Bowel loops are normal caliber, based on the volume of gas within the GI  tract, mild ileus may be present. There is no bowel wall thickening.  Review of bone windows is unremarkable.       Impression:      1. There is persistent mild but improved urothelial thickening involving  the left renal pelvis and proximal left ureter, which may be related to  resolving UTI. The internal/external left-sided nephroureterostomy tube  appears in satisfactory position. There is mild left-sided  hydronephrosis which is decreased. Small amount gas is noted in the  bladder. No significant bladder wall thickening is identified.  2. Small left pleural effusion. Increased hazy airspace opacities in the  lung bases favored to represent diffuse atelectasis. Based on the volume  of gas within the GI tract, mild ileus may be present. There is no  evidence of bowel obstruction.  3. Small amount fluid within the upper vagina, where previously there  was a small amount of gas.  4. Hepatic steatosis.        This report was finalized on 04/07/2022 16:38 by Dr. Florian Laurent MD.    CT Angiogram Chest With & Without Contrast [[id number]] Collected: 04/07/22 1624     Updated: 04/07/22 1634    Narrative:      EXAM: CT ANGIOGRAM CHEST W WO CONTRAST-     INDICATION: History PEs due to COVID; E87.6-Hypokalemia; N39.0-Urinary  tract infection, site not specified; R31.9-Hematuria, unspecified;  R11.2-Nausea with vomiting, unspecified; E87.2-Acidosis     COMPARISON: 9/20/2021     DLP: 593 mGy cm. Automated exposure control was also utilized to  decrease patient radiation dose.     FINDINGS:     No evidence of pulmonary embolus. Main pulmonary artery is nondilated.  Thoracic aorta is nonaneurysmal. No evidence of aortic dissection. Trace  pericardial fluid. Heart is mildly  enlarged.     Central airways are clear. Bilateral bandlike areas of reticulation  likely representing scarring/fibrosis from prior Covid 19 infection.  Small LEFT pleural effusion. No definite focal area of consolidation. No  suspicious pulmonary nodule. No pneumothorax. No enlarged axillary,  supraclavicular, mediastinal, or hilar lymph nodes.     No acute chest wall soft tissue abnormality. Findings in the included  upper abdomen will be described in a separate same-day report. No acute  osseous finding.       Impression:         1.  No evidence of pulmonary embolus.     2.  Small LEFT pleural effusion.     3.  Bandlike areas of reticulation throughout both lungs likely  representing scarring/fibrosis related to prior Covid 19 infection.     4.  Findings in the upper abdomen will be described in a separate  report.  This report was finalized on 04/07/2022 16:31 by Dr. Gomez Cárdenas MD.     I have reviewed the patient's current medications.     Assessment/Plan     Active Hospital Problems    Diagnosis    • **Intractable nausea and vomiting    • UTI (urinary tract infection), bacterial    • Sepsis secondary to UTI (HCC)    • Lactic acidosis    • Severe malnutrition (CMS/HCC)    • Hypophosphatemia    • Hypokalemia    • Malfunction of nephrostomy tube (HCC)    • Essential (primary) hypertension      Plan:  The patient was admitted on 4/3 by Dr. Ricks.  She presented with complaints of intractable nausea and vomiting.  Her medical problems started last August when she developed COVID-19 pneumonia and had numerous complications thereafter.  She developed an abdominal hematoma and had to go on dialysis secondary to extrinsic compression of her urinary system. She was transferred from here to [facility] and required exploratory laparotomy.  She ended up having bilateral percutaneous nephrostomy tubes and also ended up with a left ureteral stent.  She states that her main urologist is Dr. Rina Rey.   "She states that she stopped producing urine from her percutaneous nephrostomy tube recently and that there are plans for it to be removed.  She states that she had gotten in touch with [facility] to see if someone here could do it so she would not have to travel.     When she left [facility], she spent considerable time at a skilled nursing facility in [place], [place].  She states that she has only been home a few weeks.  She was admitted to [facility] in [place] on 3/9 for electrolyte abnormalities.     Renal function stable yesterday with a creatinine 0.76.  Dr. Ricks began treating a urinary tract infection with ceftriaxone.  She had a multidrug-resistant Klebsiella in October 2021.  I transitioned her to Invanz on 4/3.  Lactate down to 1.7 from 2.8 after fluids.  Urine does appear infected with too numerous to count white blood cells, 4+ bacteria and large leukocyte esterase. The sample was also bloody.  Urine culture has grown 2 distinct pathogens.  There is a multidrug-resistant Proteus mirabilis strain and an ESBL Klebsiella strain.  Invanz should be treating both of these organisms concurrently.  Today is day 5 of Invanz.  We will treat 7-10 days. Lactic acidosis improved after fluids and antibiotics.  Procalcitonin elevated at 0.31.  Dr. Dyer is familiar with her previous problems as he saw her last year.  I consulted him to evaluate her.  He wants to treat her current infection and then have her follow back at [facility].  Repeat CT done by Dr. Damon for other reasons shows improvement of her urinary system.     Blood cultures contaminated with coagulase-negative Staphylococcus.    Replaced potassium aggressively. Multiple runs ordered and it is also in her fluids. \"I have always had potassium problems.\"  Potassium now 3.7 (5.).  Recent replacement of phosphorus and potassium with IV potassium phosphate.  Will replace phosphorus today with IV sodium phosphate.  Started oral sodium " bicarbonate on 4/5.     Dr. Ricks asked for a GI opinion.  Dr. Babin saw and recommended an abdominal ultrasound. There is a sludge-filled gallbladder with no significant gallbladder wall thickening, pericholecystic fluid, or convincing evidence of acute cholecystitis.  Mild dilatation of the common hepatic duct with no visualized choledocholithiasis. HIDA scan shows an EF of 20%. Consulted general surgery to consider laparoscopic cholecystectomy given her sludge-filled gallbladder and equivocal ejection fraction.  Dr. Alicia Damon tentatively planned surgical intervention this afternoon.     She has been complaining of sinus congestion and her ears have been painful. Exam was normal. Continues on flonase, corticosporin eardrops. Consulted ENT on 4/6.  Discussed with Dr. Nichols.  He is going to take her for audiograms in the office today.       She is on Xarelto for history of pulmonary embolus. Dr. Damon would like for her to be off all anticoagulation for the time being.  She has not had Xarelto since the evening of 4/4 and has not had therapeutic Lovenox since 1233 on 4/6.  Repeat CTA of the chest shows no pulmonary embolus.  Question if she needs to go back on full dose anticoagulation postsurgically.  Her venous thromboembolism was provoked last fall secondary to her COVID-19 infection.    Discharge Planning: I expect the patient to be discharged to ? in ? days.    Electronically signed by Fermin Mcclure DO, 04/08/22, 12:13 CDT.

## 2022-04-09 LAB
ALBUMIN SERPL-MCNC: 2.6 G/DL (ref 3.5–5.2)
ALBUMIN/GLOB SERPL: 1.1 G/DL
ALP SERPL-CCNC: 99 U/L (ref 39–117)
ALT SERPL W P-5'-P-CCNC: 10 U/L (ref 1–33)
ANION GAP SERPL CALCULATED.3IONS-SCNC: 13 MMOL/L (ref 5–15)
AST SERPL-CCNC: 18 U/L (ref 1–32)
BILIRUB SERPL-MCNC: 0.4 MG/DL (ref 0–1.2)
BUN SERPL-MCNC: 8 MG/DL (ref 6–20)
BUN/CREAT SERPL: 10.3 (ref 7–25)
CALCIUM SPEC-SCNC: 8.9 MG/DL (ref 8.6–10.5)
CHLORIDE SERPL-SCNC: 106 MMOL/L (ref 98–107)
CO2 SERPL-SCNC: 17 MMOL/L (ref 22–29)
CREAT SERPL-MCNC: 0.78 MG/DL (ref 0.57–1)
DEPRECATED RDW RBC AUTO: 46.4 FL (ref 37–54)
EGFRCR SERPLBLD CKD-EPI 2021: 96.2 ML/MIN/1.73
ERYTHROCYTE [DISTWIDTH] IN BLOOD BY AUTOMATED COUNT: 15.3 % (ref 12.3–15.4)
GLOBULIN UR ELPH-MCNC: 2.3 GM/DL
GLUCOSE SERPL-MCNC: 72 MG/DL (ref 65–99)
HCT VFR BLD AUTO: 31 % (ref 34–46.6)
HGB BLD-MCNC: 10.6 G/DL (ref 12–15.9)
MAGNESIUM SERPL-MCNC: 1.7 MG/DL (ref 1.6–2.6)
MCH RBC QN AUTO: 28.4 PG (ref 26.6–33)
MCHC RBC AUTO-ENTMCNC: 34.2 G/DL (ref 31.5–35.7)
MCV RBC AUTO: 83.1 FL (ref 79–97)
PHOSPHATE SERPL-MCNC: 2.6 MG/DL (ref 2.5–4.5)
PLATELET # BLD AUTO: 241 10*3/MM3 (ref 140–450)
PMV BLD AUTO: 9.6 FL (ref 6–12)
POTASSIUM SERPL-SCNC: 4.3 MMOL/L (ref 3.5–5.2)
PROT SERPL-MCNC: 4.9 G/DL (ref 6–8.5)
RBC # BLD AUTO: 3.73 10*6/MM3 (ref 3.77–5.28)
SODIUM SERPL-SCNC: 136 MMOL/L (ref 136–145)
WBC NRBC COR # BLD: 9.55 10*3/MM3 (ref 3.4–10.8)

## 2022-04-09 PROCEDURE — 85027 COMPLETE CBC AUTOMATED: CPT | Performed by: SPECIALIST

## 2022-04-09 PROCEDURE — 25010000002 ERTAPENEM PER 500 MG: Performed by: INTERNAL MEDICINE

## 2022-04-09 PROCEDURE — 84100 ASSAY OF PHOSPHORUS: CPT | Performed by: INTERNAL MEDICINE

## 2022-04-09 PROCEDURE — 83735 ASSAY OF MAGNESIUM: CPT | Performed by: INTERNAL MEDICINE

## 2022-04-09 PROCEDURE — 80053 COMPREHEN METABOLIC PANEL: CPT | Performed by: INTERNAL MEDICINE

## 2022-04-09 PROCEDURE — 25010000002 METOCLOPRAMIDE PER 10 MG: Performed by: INTERNAL MEDICINE

## 2022-04-09 RX ORDER — METOCLOPRAMIDE 5 MG/1
5 TABLET ORAL
Status: DISCONTINUED | OUTPATIENT
Start: 2022-04-09 | End: 2022-04-10

## 2022-04-09 RX ORDER — PANTOPRAZOLE SODIUM 40 MG/1
40 TABLET, DELAYED RELEASE ORAL
Status: DISCONTINUED | OUTPATIENT
Start: 2022-04-09 | End: 2022-04-14

## 2022-04-09 RX ADMIN — Medication 2 SPRAY: at 20:42

## 2022-04-09 RX ADMIN — NEOMYCIN SULFATE, POLYMYXIN B SULFATE AND HYDROCORTISONE 4 DROP: 10; 3.5; 1 SUSPENSION/ DROPS AURICULAR (OTIC) at 17:39

## 2022-04-09 RX ADMIN — Medication 10 ML: at 09:02

## 2022-04-09 RX ADMIN — FLUOXETINE HYDROCHLORIDE 40 MG: 20 CAPSULE ORAL at 09:02

## 2022-04-09 RX ADMIN — METOCLOPRAMIDE HYDROCHLORIDE 5 MG: 5 INJECTION INTRAMUSCULAR; INTRAVENOUS at 04:53

## 2022-04-09 RX ADMIN — PANTOPRAZOLE SODIUM 40 MG: 40 TABLET, DELAYED RELEASE ORAL at 20:42

## 2022-04-09 RX ADMIN — SODIUM BICARBONATE 650 MG: 650 TABLET ORAL at 20:42

## 2022-04-09 RX ADMIN — SODIUM BICARBONATE 650 MG: 650 TABLET ORAL at 09:02

## 2022-04-09 RX ADMIN — METOCLOPRAMIDE 5 MG: 5 TABLET ORAL at 20:42

## 2022-04-09 RX ADMIN — FLUTICASONE PROPIONATE 2 SPRAY: 50 SPRAY, METERED NASAL at 20:42

## 2022-04-09 RX ADMIN — Medication 2 SPRAY: at 09:02

## 2022-04-09 RX ADMIN — METOCLOPRAMIDE HYDROCHLORIDE 5 MG: 5 INJECTION INTRAMUSCULAR; INTRAVENOUS at 17:38

## 2022-04-09 RX ADMIN — TRAZODONE HYDROCHLORIDE 100 MG: 100 TABLET ORAL at 20:42

## 2022-04-09 RX ADMIN — PANTOPRAZOLE SODIUM 40 MG: 40 INJECTION, POWDER, FOR SOLUTION INTRAVENOUS at 09:02

## 2022-04-09 RX ADMIN — FLUTICASONE PROPIONATE 2 SPRAY: 50 SPRAY, METERED NASAL at 09:02

## 2022-04-09 RX ADMIN — NEOMYCIN SULFATE, POLYMYXIN B SULFATE AND HYDROCORTISONE 4 DROP: 10; 3.5; 1 SUSPENSION/ DROPS AURICULAR (OTIC) at 12:28

## 2022-04-09 RX ADMIN — Medication 10 ML: at 20:47

## 2022-04-09 RX ADMIN — METOCLOPRAMIDE HYDROCHLORIDE 5 MG: 5 INJECTION INTRAMUSCULAR; INTRAVENOUS at 12:28

## 2022-04-09 RX ADMIN — ERTAPENEM SODIUM 1 G: 1 INJECTION, POWDER, LYOPHILIZED, FOR SOLUTION INTRAMUSCULAR; INTRAVENOUS at 15:40

## 2022-04-09 RX ADMIN — NEOMYCIN SULFATE, POLYMYXIN B SULFATE AND HYDROCORTISONE 4 DROP: 10; 3.5; 1 SUSPENSION/ DROPS AURICULAR (OTIC) at 04:53

## 2022-04-09 RX ADMIN — NEOMYCIN SULFATE, POLYMYXIN B SULFATE AND HYDROCORTISONE 4 DROP: 10; 3.5; 1 SUSPENSION/ DROPS AURICULAR (OTIC) at 23:49

## 2022-04-09 RX ADMIN — Medication 10 ML: at 12:28

## 2022-04-09 NOTE — PLAN OF CARE
Goal Outcome Evaluation:  Plan of Care Reviewed With: patient        Progress: no change  Outcome Evaluation: Pt is extremely lethargic today, unable to focus or concentrate. not following commands. withdraws from pain, can localize pain at times. Pt says small phrases at times, but mostly grunts or yells out when touched. attempt to reposition with pillows, but pt tends to tilt left and curl up. PPP. Prevalon boots, heels off bed. [place], ear drops per order. incont - rob, CDI. IV abx. Tele - sinus tach. abd drsg, CDI. Call light within reach. Safety maintained. Alarm set.

## 2022-04-09 NOTE — PLAN OF CARE
Goal Outcome Evaluation:         A at times, oriented to person. Cries out in pain with pt care. Rash noted. Bedrest.   Sleeping this shift. Tele in place. ST-NS.  Turning.  [place].  Purewick in place.  Foam dressing to abdomen changed. Prevalon boots to feet. Refused oral meds. Contact precautions.  Will continue to monitor.

## 2022-04-09 NOTE — PROGRESS NOTES
Automatic IV to PO Pharmacy Note - General    Namita Zabala is a 46 y.o. female who meets the following criteria for IV to PO therapy conversion :     Tolerating oral fluids or 40ml/hour of enteral nutrition and oral route not otherwise compromised  Receiving other oral medications on a scheduled basis    Assessment/Plan  Based on this criteria, Metoclopramide 5 mg IV Q6H and Protonix 40 mg IV q12h, have been changed to Metoclopramide 5 mg PO ACHS  and Protonix 40mg po BIDac per the directives and guidelines established by [facility] and Therapeutics Committee and [facility] Medical Executive Committee .       Gomez Allison, PharmD  04/09/2218:13 CDT

## 2022-04-09 NOTE — PROGRESS NOTES
[facility] Medicine Services  INPATIENT PROGRESS NOTE    Length of Stay: 5  Date of Admission: 4/2/2022  Primary Care Physician: David Lara MD    Subjective   Chief Complaint: Nausea vomiting/dysfunction gallbladder/malfunction nephrostomy tube.    HPI   Blood pressure stable, slightly tacky.  Patient is extremely somnolent.  Patient wake up and go right back to sleep.  Patient is afebrile.  Creatinine is normal.  Good urine output.  Patient is currently on room air.  Patient no acute distress.    Review of Systems   Unable to obtain due to somnolence.  Patient no acute distress.    All pertinent negatives and positives are as above. All other systems have been reviewed and are negative unless otherwise stated.     Objective    Temp:  [97.6 °F (36.4 °C)-98.3 °F (36.8 °C)] 97.6 °F (36.4 °C)  Heart Rate:  [109-116] 116  Resp:  [16-18] 18  BP: (132-142)/(72-93) 135/91    Intake/Output Summary (Last 24 hours) at 4/9/2022 0751  Last data filed at 4/9/2022 0457  Gross per 24 hour   Intake 50 ml   Output 1250 ml   Net -1200 ml     Physical Exam  Vitals and nursing note reviewed.   Constitutional:       Appearance: She is well-developed.   HENT:      Head: Normocephalic.   Eyes:      Conjunctiva/sclera: Conjunctivae normal.      Pupils: Pupils are equal, round, and reactive to light.   Neck:      Vascular: No JVD.   Cardiovascular:      Rate and Rhythm: Regular rhythm. Tachycardia present.      Heart sounds: Normal heart sounds. No murmur heard.    No friction rub. No gallop.   Pulmonary:      Effort: No respiratory distress.      Breath sounds: No wheezing or rales.      Comments: Diminished breath sound bilateral, clear .  Chest:      Chest wall: No tenderness.   Abdominal:      General: Bowel sounds are normal. There is no distension.      Palpations: Abdomen is soft.      Tenderness: There is no abdominal tenderness. There is no guarding or rebound.   Musculoskeletal:          General: No tenderness or deformity.      Cervical back: Neck supple.   Skin:     General: Skin is warm and dry.      Capillary Refill: Capillary refill takes 2 to 3 seconds.      Findings: No rash.   Neurological:      Cranial Nerves: No cranial nerve deficit.      Motor: Weakness present. No abnormal muscle tone.      Coordination: Coordination abnormal.      Gait: Gait abnormal.      Deep Tendon Reflexes: Reflexes normal.   Psychiatric:         Behavior: Behavior normal.         Thought Content: Thought content normal.         Results Review:  Lab Results (last 24 hours)     ** No results found for the last 24 hours. **           Cultures:  Blood Culture   Date Value Ref Range Status   04/02/2022 No growth at 5 days  Final   04/02/2022 Staphylococcus, coagulase negative (C)  Final     Urine Culture   Date Value Ref Range Status   04/02/2022 >100,000 CFU/mL Proteus mirabilis (A)  Final   04/02/2022 >100,000 CFU/mL Klebsiella oxytoca ESBL (A)  Final     Comment:     Consider infectious disease consult.  Susceptibility results may not correlate to clinical outcomes.       Radiology Data:    Imaging Results (Last 24 Hours)     ** No results found for the last 24 hours. **          Allergies   Allergen Reactions   • Coconut Unknown - High Severity   • Nuts Unknown - High Severity   • Penicillins    • [place] Other (See Comments)     Causes migraines per pt reports       Scheduled meds:   ertapenem, 1 g, Intravenous, Q24H  FLUoxetine, 40 mg, Oral, Daily  fluticasone, 2 spray, Each Nare, BID  metoclopramide, 5 mg, Intravenous, Q6H  neomycin-polymyxin-hydrocortisone, 4 drop, Both Ears, Q6H  oxymetazoline, 2 spray, Nasal, BID  pantoprazole, 40 mg, Intravenous, Q12H  sodium bicarbonate, 650 mg, Oral, BID  sodium chloride, 10 mL, Intravenous, Q12H  SUMAtriptan, 50 mg, Oral, Once  traZODone, 100 mg, Oral, Nightly        PRN meds:  •  butalbital-acetaminophen-caffeine  •  droperidol  •  methocarbamol  •  Morphine  •   ondansetron **OR** ondansetron  •  prochlorperazine  •  promethazine  •  sodium chloride  •  [COMPLETED] Insert peripheral IV **AND** sodium chloride  •  [COMPLETED] Insert peripheral IV **AND** sodium chloride  •  sodium chloride  •  zolpidem    Assessment/Plan       Intractable nausea and vomiting    Sepsis secondary to UTI (HCC)    Lactic acidosis    Hypokalemia    Essential (primary) hypertension    UTI (urinary tract infection), bacterial    Malfunction of nephrostomy tube (HCC)    Severe malnutrition (CMS/HCC)    Hypophosphatemia      Plan:  HPI.  Patient was admitted on 4/3 by Dr. Ricks.  Had COVID-19 here last year and ended up having an abdominal hematoma and had to go to [place].  Was there for a long time and then an LTAC and then a skilled nursing facility.  Comes back with multidrug-resistant UTI on Invanz.   She presented with complaints of intractable nausea and vomiting.  Her medical problems started last August when she developed COVID-19 pneumonia and had numerous complications thereafter.  She developed an abdominal hematoma and had to go on dialysis secondary to extrinsic compression of her urinary system. She was transferred from here to [facility] and required exploratory laparotomy.  She ended up having bilateral percutaneous nephrostomy tubes and also ended up with a left ureteral stent.  She states that her main urologist is Dr. Rina Rey.  She states that she stopped producing urine from her percutaneous nephrostomy tube recently and that there are plans for it to be removed.  She states that she had gotten in touch with [place] to see if someone here could do it so she would not have to travel.  When she left [place], she spent considerable time at a skilled nursing facility in [place], [place].  She states that she has only been home a few weeks.  She was admitted to [facility] [place], operated by [facility] in [place] on 3/9 for electrolyte abnormalities.    UTI.  Renal  "function stable yesterday with a creatinine 0.76.  Dr. Ricks began treating a urinary tract infection with ceftriaxone.  She had a multidrug-resistant Klebsiella in October 2021.  I transitioned her to [facility] on 4/3.  Lactate down to 1.7 from 2.8 after fluids.  Urine does appear infected with too numerous to count white blood cells, 4+ bacteria and large leukocyte esterase. The sample was also bloody.  Urine culture has grown 2 distinct pathogens.  There is a multidrug-resistant Proteus mirabilis strain and an ESBL Klebsiella strain.  Invanz should be treating both of these organisms concurrently.  Today is day 5 of Invanz.  We will treat 7-10 days.  Lactic acidosis improved after fluids and antibiotics.  Procalcitonin elevated at 0.31.  Dr. Dyer is familiar with her previous problems as he saw her last year.  I consulted him to evaluate her.  He wants to treat her current infection and then have her follow back at [place].  On [facility].  Recommendation from urology-  Urology saw her and wants her to go back to [place] to have her percutaneous nephrostomy pulled.    Hypokalemia . Multiple runs ordered and it is also in her fluids. \"I have always had potassium problems.\"  Potassium now 5.0.  Recent replacement of phosphorus and potassium with IV potassium phosphate.  Will replace phosphorus today with IV sodium phosphate.  Started oral sodium bicarbonate on 4/5.    Sludge in gallbladder/dysfunctional gallbladder.  Dr. Babin saw and recommended an abdominal ultrasound.  There is a sludge-filled gallbladder with no significant gallbladder wall thickening, pericholecystic fluid, or convincing evidence of acute cholecystitis.  Mild dilatation of the common hepatic duct with no visualized choledocholithiasis. HIDA scan shows an EF of 20%. Consulted general surgery to consider laparoscopic cholecystectomy given her sludge-filled gallbladder and equivocal ejection fraction.  Dr. Alicia Damon is interested in " doing this, but would like to obtain new CT scans today and also have her off anticoagulation a bit longer.  CT scan abdomen pelvic-persistent mild but improved urothelial thickening involving the left renal pelvis and proximal left ureter- related to resolving UTI,  internal/external left-sided nephroureterostomy tube appears in satisfactory position, mild left-sided hydronephrosis which is decreased, Small amount gas is noted in the bladder,  No significant bladder wall thickening is identified, Small left pleural effusion- Increased hazy airspace opacities in the lung bases favored to represent diffuse atelectasis,  Based on the volume of gas within the GI tract- mild ileus may be present,  No evidence of bowel obstruction, Small amount fluid within the upper vagina where previously there was a small amount of gas, Hepatic steatosis.  HIDA scan-20% biliary ejection fraction.  Ultrasound abdomen pelvic-Sludge-filled gallbladder with no significant gallbladder wall thickening, pericholecystic fluid or convincing evidence of acute cholecystitis, Mild dilation of the common hepatic duct with no visualized choledocholithiasis, Hepatic steatosis.    Nausea/vomiting.  Reglan.  Protonix.  Bicarb.  Inapsine as needed.  Compazine as needed.  Phenergan as needed.    Sinus congestion/sinus pain.  Flonase.  Corticosporin eardrop.  ENT consult.  Afrin.  Dr. Nichols saw her and has wanted to do an audiogram, but she has declined to go over to his office the last 2 days.   CTA chest-No evidence of pulmonary embolus, Small LEFT pleural effusion, Bandlike areas of reticulation throughout both lungs likely representing scarring/fibrosis related to prior Covid 19 infection.      History of pulmonary embolus.  Hold Xarelto possible surgery.  Dr. Damon would like for her to be off all anticoagulation for the time being.  She has not had Xarelto since the evening of 4/4 and has not had therapeutic Lovenox since 1233 on  /6.    Anxiety/depression/insomnia . Prozac.  Prozac nightly.  Headaches.  Fioricet as needed.  Ambien as needed.    Pain.  Robaxin as needed.  Morphine as needed.    Allergic rhinitis.  Flonase.    Nutrition.  Full liquid diet.    Blood culture-contamination with coagulase negative Staphylococcus.  Urine culture shows Proteus and Klebsiella.  Covid-19-negative.  Flu screen A and B-negative.    Discharge Plannin to 6 days.    Electronically signed by Amado Villarreal MD, 22, 7:51 AM CDT.

## 2022-04-10 LAB
ANION GAP SERPL CALCULATED.3IONS-SCNC: 16 MMOL/L (ref 5–15)
BUN SERPL-MCNC: 11 MG/DL (ref 6–20)
BUN/CREAT SERPL: 13.4 (ref 7–25)
CALCIUM SPEC-SCNC: 9.1 MG/DL (ref 8.6–10.5)
CHLORIDE SERPL-SCNC: 108 MMOL/L (ref 98–107)
CHOLEST SERPL-MCNC: 164 MG/DL (ref 0–200)
CO2 SERPL-SCNC: 15 MMOL/L (ref 22–29)
CREAT SERPL-MCNC: 0.82 MG/DL (ref 0.57–1)
DEPRECATED RDW RBC AUTO: 46.8 FL (ref 37–54)
EGFRCR SERPLBLD CKD-EPI 2021: 90.6 ML/MIN/1.73
ERYTHROCYTE [DISTWIDTH] IN BLOOD BY AUTOMATED COUNT: 15.6 % (ref 12.3–15.4)
GLUCOSE SERPL-MCNC: 101 MG/DL (ref 65–99)
HCT VFR BLD AUTO: 30.7 % (ref 34–46.6)
HDLC SERPL-MCNC: 64 MG/DL (ref 40–60)
HGB BLD-MCNC: 10.3 G/DL (ref 12–15.9)
LDLC SERPL CALC-MCNC: 75 MG/DL (ref 0–100)
LDLC/HDLC SERPL: 1.11 {RATIO}
MCH RBC QN AUTO: 28 PG (ref 26.6–33)
MCHC RBC AUTO-ENTMCNC: 33.6 G/DL (ref 31.5–35.7)
MCV RBC AUTO: 83.4 FL (ref 79–97)
PLATELET # BLD AUTO: 241 10*3/MM3 (ref 140–450)
PMV BLD AUTO: 9.1 FL (ref 6–12)
POTASSIUM SERPL-SCNC: 4.8 MMOL/L (ref 3.5–5.2)
RBC # BLD AUTO: 3.68 10*6/MM3 (ref 3.77–5.28)
SODIUM SERPL-SCNC: 139 MMOL/L (ref 136–145)
TRIGL SERPL-MCNC: 144 MG/DL (ref 0–150)
VLDLC SERPL-MCNC: 25 MG/DL (ref 5–40)
WBC NRBC COR # BLD: 11.73 10*3/MM3 (ref 3.4–10.8)

## 2022-04-10 PROCEDURE — 80048 BASIC METABOLIC PNL TOTAL CA: CPT | Performed by: FAMILY MEDICINE

## 2022-04-10 PROCEDURE — 80061 LIPID PANEL: CPT | Performed by: FAMILY MEDICINE

## 2022-04-10 PROCEDURE — 85027 COMPLETE CBC AUTOMATED: CPT | Performed by: SPECIALIST

## 2022-04-10 PROCEDURE — 25010000002 ENOXAPARIN PER 10 MG: Performed by: FAMILY MEDICINE

## 2022-04-10 RX ORDER — BISACODYL 10 MG
10 SUPPOSITORY, RECTAL RECTAL DAILY
Status: DISCONTINUED | OUTPATIENT
Start: 2022-04-10 | End: 2022-04-15

## 2022-04-10 RX ORDER — METOPROLOL SUCCINATE 50 MG/1
50 TABLET, EXTENDED RELEASE ORAL
Status: DISCONTINUED | OUTPATIENT
Start: 2022-04-10 | End: 2022-04-10

## 2022-04-10 RX ORDER — METOPROLOL SUCCINATE 100 MG/1
100 TABLET, EXTENDED RELEASE ORAL
Status: DISCONTINUED | OUTPATIENT
Start: 2022-04-11 | End: 2022-04-11

## 2022-04-10 RX ORDER — LABETALOL HYDROCHLORIDE 5 MG/ML
10 INJECTION, SOLUTION INTRAVENOUS EVERY 4 HOURS PRN
Status: DISCONTINUED | OUTPATIENT
Start: 2022-04-10 | End: 2022-04-21 | Stop reason: HOSPADM

## 2022-04-10 RX ORDER — AMOXICILLIN 250 MG
1 CAPSULE ORAL DAILY
Status: DISCONTINUED | OUTPATIENT
Start: 2022-04-10 | End: 2022-04-10

## 2022-04-10 RX ORDER — FLUOXETINE HYDROCHLORIDE 20 MG/1
20 CAPSULE ORAL NIGHTLY
Status: DISCONTINUED | OUTPATIENT
Start: 2022-04-10 | End: 2022-04-20

## 2022-04-10 RX ORDER — AMOXICILLIN 250 MG
2 CAPSULE ORAL DAILY
Status: DISCONTINUED | OUTPATIENT
Start: 2022-04-11 | End: 2022-04-14

## 2022-04-10 RX ORDER — HYDRALAZINE HYDROCHLORIDE 20 MG/ML
10 INJECTION INTRAMUSCULAR; INTRAVENOUS EVERY 4 HOURS PRN
Status: DISCONTINUED | OUTPATIENT
Start: 2022-04-10 | End: 2022-04-21 | Stop reason: HOSPADM

## 2022-04-10 RX ADMIN — NEOMYCIN SULFATE, POLYMYXIN B SULFATE AND HYDROCORTISONE 4 DROP: 10; 3.5; 1 SUSPENSION/ DROPS AURICULAR (OTIC) at 06:43

## 2022-04-10 RX ADMIN — METOPROLOL SUCCINATE 50 MG: 100 TABLET, EXTENDED RELEASE ORAL at 12:51

## 2022-04-10 RX ADMIN — METOCLOPRAMIDE 5 MG: 5 TABLET ORAL at 09:10

## 2022-04-10 RX ADMIN — FLUOXETINE HYDROCHLORIDE 40 MG: 20 CAPSULE ORAL at 09:10

## 2022-04-10 RX ADMIN — Medication 2 SPRAY: at 09:10

## 2022-04-10 RX ADMIN — DOCUSATE SODIUM 50 MG AND SENNOSIDES 8.6 MG 1 TABLET: 8.6; 5 TABLET, FILM COATED ORAL at 12:51

## 2022-04-10 RX ADMIN — SODIUM BICARBONATE 650 MG: 650 TABLET ORAL at 21:24

## 2022-04-10 RX ADMIN — ENOXAPARIN SODIUM 70 MG: 100 INJECTION SUBCUTANEOUS at 21:25

## 2022-04-10 RX ADMIN — LABETALOL HYDROCHLORIDE 10 MG: 5 INJECTION INTRAVENOUS at 15:18

## 2022-04-10 RX ADMIN — SODIUM BICARBONATE 650 MG: 650 TABLET ORAL at 09:10

## 2022-04-10 RX ADMIN — METOPROLOL SUCCINATE 50 MG: 50 TABLET, FILM COATED, EXTENDED RELEASE ORAL at 10:11

## 2022-04-10 RX ADMIN — NEOMYCIN SULFATE, POLYMYXIN B SULFATE AND HYDROCORTISONE 4 DROP: 10; 3.5; 1 SUSPENSION/ DROPS AURICULAR (OTIC) at 18:34

## 2022-04-10 RX ADMIN — Medication 10 ML: at 09:10

## 2022-04-10 RX ADMIN — PANTOPRAZOLE SODIUM 40 MG: 40 TABLET, DELAYED RELEASE ORAL at 16:30

## 2022-04-10 RX ADMIN — BISACODYL 10 MG: 10 SUPPOSITORY RECTAL at 12:51

## 2022-04-10 RX ADMIN — Medication 10 ML: at 15:18

## 2022-04-10 RX ADMIN — Medication 2 SPRAY: at 21:26

## 2022-04-10 RX ADMIN — FLUTICASONE PROPIONATE 2 SPRAY: 50 SPRAY, METERED NASAL at 21:25

## 2022-04-10 RX ADMIN — ENOXAPARIN SODIUM 70 MG: 100 INJECTION SUBCUTANEOUS at 12:58

## 2022-04-10 RX ADMIN — SODIUM BICARBONATE 100 MEQ: 84 INJECTION, SOLUTION INTRAVENOUS at 12:54

## 2022-04-10 RX ADMIN — FLUTICASONE PROPIONATE 2 SPRAY: 50 SPRAY, METERED NASAL at 09:10

## 2022-04-10 RX ADMIN — PANTOPRAZOLE SODIUM 40 MG: 40 TABLET, DELAYED RELEASE ORAL at 09:10

## 2022-04-10 RX ADMIN — NEOMYCIN SULFATE, POLYMYXIN B SULFATE AND HYDROCORTISONE 4 DROP: 10; 3.5; 1 SUSPENSION/ DROPS AURICULAR (OTIC) at 12:53

## 2022-04-10 NOTE — PLAN OF CARE
Problem: Adult Inpatient Plan of Care  Goal: Plan of Care Review  Outcome: Ongoing, Progressing  Flowsheets (Taken 4/10/2022 1520)  Progress: no change  Plan of Care Reviewed With: (RN) other (see comments)  Outcome Evaluation: New OT/PT orders received. Per RN report, pt is largely unresponsive. Pt is reported to not be responding to any verbal stimuli, only responding to painful stimuli. Pt is not following any commands per nsg report. Pt not appropriate for therapy at this time. OT to sign off at this time.

## 2022-04-10 NOTE — PROGRESS NOTES
[facility] Medicine Services  INPATIENT PROGRESS NOTE    Length of Stay: 6  Date of Admission: 4/2/2022  Primary Care Physician: David Lara MD    Subjective   Chief Complaint: Nausea vomiting/dysfunction gallbladder/malfunction nephrostomy tube.    HPI   No sign nausea vomiting.  Patient tolerated full liquid diet.  Discussion is to verify if surgery can be done by general surgery for the gallbladder.  If not, patient will need to be back on Xarelto.  Patient no acute distress.  Patient very somnolent.    Review of Systems   Unable to obtain due to somnolence.  Patient no acute distress.    All pertinent negatives and positives are as above. All other systems have been reviewed and are negative unless otherwise stated.     Objective    Temp:  [97 °F (36.1 °C)-98.5 °F (36.9 °C)] 97.8 °F (36.6 °C)  Heart Rate:  [110-121] 121  Resp:  [18-20] 18  BP: (131-160)/() 160/120    Intake/Output Summary (Last 24 hours) at 4/10/2022 1028  Last data filed at 4/10/2022 0828  Gross per 24 hour   Intake --   Output 600 ml   Net -600 ml     Physical Exam  Vitals and nursing note reviewed.   Constitutional:       Appearance: She is well-developed.   HENT:      Head: Normocephalic.   Eyes:      Conjunctiva/sclera: Conjunctivae normal.      Pupils: Pupils are equal, round, and reactive to light.   Neck:      Vascular: No JVD.   Cardiovascular:      Rate and Rhythm: Regular rhythm. Tachycardia present.      Heart sounds: Normal heart sounds. No murmur heard.    No friction rub. No gallop.   Pulmonary:      Effort: No respiratory distress.      Breath sounds: No wheezing or rales.      Comments: Diminished breath sound bilateral, clear .  Chest:      Chest wall: No tenderness.   Abdominal:      General: Bowel sounds are normal. There is no distension.      Palpations: Abdomen is soft.      Tenderness: There is no abdominal tenderness. There is no guarding or rebound.    Musculoskeletal:         General: No tenderness or deformity.      Cervical back: Neck supple.   Skin:     General: Skin is warm and dry.      Capillary Refill: Capillary refill takes 2 to 3 seconds.      Findings: No rash.   Neurological:      Cranial Nerves: No cranial nerve deficit.      Motor: Weakness present. No abnormal muscle tone.      Coordination: Coordination abnormal.      Gait: Gait abnormal.      Deep Tendon Reflexes: Reflexes normal.   Psychiatric:         Behavior: Behavior normal.         Thought Content: Thought content normal.      Results Review:  Lab Results (last 24 hours)     Procedure Component Value Units Date/Time    Lipid Panel [[id number]]  (Abnormal) Collected: 04/10/22 0920    Specimen: Blood Updated: 04/10/22 0954     Total Cholesterol 164 mg/dL      Triglycerides 144 mg/dL      HDL Cholesterol 64 mg/dL      LDL Cholesterol  75 mg/dL      VLDL Cholesterol 25 mg/dL      LDL/HDL Ratio 1.11    Narrative:      Cholesterol Reference Ranges  (U.S. Department of Health and Human Services ATP III Classifications)    Desirable          <200 mg/dL  Borderline High    200-239 mg/dL  High Risk          >240 mg/dL      Triglyceride Reference Ranges  (U.S. Department of Health and Human Services ATP III Classifications)    Normal           <150 mg/dL  Borderline High  150-199 mg/dL  High             200-499 mg/dL  Very High        >500 mg/dL    HDL Reference Ranges  (U.S. Department of Health and Human Services ATP III Classifications)    Low     <40 mg/dl (major risk factor for CHD)  High    >60 mg/dl ('negative' risk factor for CHD)        LDL Reference Ranges  (U.S. Department of Health and Human Services ATP III Classifications)    Optimal          <100 mg/dL  Near Optimal     100-129 mg/dL  Borderline High  130-159 mg/dL  High             160-189 mg/dL  Very High        >189 mg/dL    Basic Metabolic Panel [[id number]]  (Abnormal) Collected: 04/10/22 0920    Specimen: Blood Updated: 04/10/22  0954     Glucose 101 mg/dL      BUN 11 mg/dL      Creatinine 0.82 mg/dL      Sodium 139 mmol/L      Potassium 4.8 mmol/L      Comment: Slight hemolysis detected by analyzer. Results may be affected.        Chloride 108 mmol/L      CO2 15.0 mmol/L      Calcium 9.1 mg/dL      BUN/Creatinine Ratio 13.4     Anion Gap 16.0 mmol/L      eGFR 90.6 mL/min/1.73      Comment: National Kidney Foundation and American Society of Nephrology (ASN) Task Force recommended calculation based on the Chronic Kidney Disease Epidemiology Collaboration (CKD-EPI) equation refit without adjustment for race.       Narrative:      GFR Normal >60  Chronic Kidney Disease <60  Kidney Failure <15      CBC (No Diff) [[id number]]  (Abnormal) Collected: 04/10/22 0920    Specimen: Blood Updated: 04/10/22 0931     WBC 11.73 10*3/mm3      RBC 3.68 10*6/mm3      Hemoglobin 10.3 g/dL      Hematocrit 30.7 %      MCV 83.4 fL      MCH 28.0 pg      MCHC 33.6 g/dL      RDW 15.6 %      RDW-SD 46.8 fl      MPV 9.1 fL      Platelets 241 10*3/mm3            Cultures:  No results found for: BLOODCX, URINECX, WOUNDCX, MRSACX, RESPCX, STOOLCX    Radiology Data:    Imaging Results (Last 24 Hours)     ** No results found for the last 24 hours. **          Allergies   Allergen Reactions   • Coconut Unknown - High Severity   • Nuts Unknown - High Severity   • Penicillins    • [place] Other (See Comments)     Causes migraines per pt reports       Scheduled meds:   FLUoxetine, 40 mg, Oral, Daily  fluticasone, 2 spray, Each Nare, BID  metoclopramide, 5 mg, Oral, 4x Daily AC & at Bedtime  metoprolol succinate XL, 50 mg, Oral, Q24H  neomycin-polymyxin-hydrocortisone, 4 drop, Both Ears, Q6H  oxymetazoline, 2 spray, Nasal, BID  pantoprazole, 40 mg, Oral, BID AC  sodium bicarbonate, 650 mg, Oral, BID  sodium chloride, 10 mL, Intravenous, Q12H  SUMAtriptan, 50 mg, Oral, Once  traZODone, 100 mg, Oral, Nightly        PRN meds:  •  butalbital-acetaminophen-caffeine  •  methocarbamol  •   Morphine  •  ondansetron **OR** ondansetron  •  promethazine  •  sodium chloride  •  [COMPLETED] Insert peripheral IV **AND** sodium chloride  •  [COMPLETED] Insert peripheral IV **AND** sodium chloride  •  sodium chloride  •  zolpidem    Assessment/Plan       Intractable nausea and vomiting    Sepsis secondary to UTI (HCC)    Lactic acidosis    Hypokalemia    Essential (primary) hypertension    UTI (urinary tract infection), bacterial    Malfunction of nephrostomy tube (HCC)    Severe malnutrition (CMS/HCC)    Hypophosphatemia      Plan:  HPI.  Patient was admitted on 4/3 by Dr. Ricks.  Had COVID-19 here last year and ended up having an abdominal hematoma and had to go to [place].  Was there for a long time and then an LTAC and then a skilled nursing facility.  Comes back with multidrug-resistant UTI on Invanz.   She presented with complaints of intractable nausea and vomiting.  Her medical problems started last August when she developed COVID-19 pneumonia and had numerous complications thereafter.  She developed an abdominal hematoma and had to go on dialysis secondary to extrinsic compression of her urinary system. She was transferred from here to [facility] and required exploratory laparotomy.  She ended up having bilateral percutaneous nephrostomy tubes and also ended up with a left ureteral stent.  She states that her main urologist is Dr. Rina Rey.  She states that she stopped producing urine from her percutaneous nephrostomy tube recently and that there are plans for it to be removed.  She states that she had gotten in touch with [facility] to see if someone here could do it so she would not have to travel.  When she left [facility], she spent considerable time at a skilled nursing facility in [place], [place].  She states that she has only been home a few weeks.  She was admitted to [facility] in [place] on 3/9 for electrolyte abnormalities.     UTI.  Renal function  stable yesterday with a creatinine 0.76.  Dr. Ricks began treating a urinary tract infection with ceftriaxone.  She had a multidrug-resistant Klebsiella in October 2021.  I transitioned her to [facility] on 4/3.  Lactate down to 1.7 from 2.8 after fluids.  Urine does appear infected with too numerous to count white blood cells, 4+ bacteria and large leukocyte esterase. The sample was also bloody.  Urine culture has grown 2 distinct pathogens.  There is a multidrug-resistant Proteus mirabilis strain and an ESBL Klebsiella strain.  Invanz should be treating both of these organisms concurrently.  [facility]  We will treat 7-10 days.  Lactic acidosis improved after fluids and antibiotics.  Procalcitonin elevated at 0.31.  Dr. Dyer is familiar with her previous problems as he saw her last year.  I consulted him to evaluate her.  He wants to treat her current infection and then have her follow back at [facility].  [facility].  Recommendation from urology-  Urology saw her and wants her to go back to [place] to have her percutaneous nephrostomy pulled.     Hypokalemia .  Resolved.      Sludge in gallbladder/dysfunctional gallbladder.  General surgery consult.  GI consult. No surgery per general surgery.  CT scan abdomen pelvic-persistent mild but improved urothelial thickening involving the left renal pelvis and proximal left ureter- related to resolving UTI,  internal/external left-sided nephroureterostomy tube appears in satisfactory position, mild left-sided hydronephrosis which is decreased, Small amount gas is noted in the bladder,  No significant bladder wall thickening is identified, Small left pleural effusion- Increased hazy airspace opacities in the lung bases favored to represent diffuse atelectasis,  Based on the volume of gas within the GI tract- mild ileus may be present,  No evidence of bowel obstruction, Small amount fluid within the upper vagina where previously there was a small amount  of gas, Hepatic steatosis.  HIDA scan-20% biliary ejection fraction.  Ultrasound abdomen pelvic-Sludge-filled gallbladder with no significant gallbladder wall thickening, pericholecystic fluid or convincing evidence of acute cholecystitis, Mild dilation of the common hepatic duct with no visualized choledocholithiasis, Hepatic steatosis.     Nausea/vomiting.  DC Reglan.  Protonix.  Bicarb. DC Inapsine as needed.  DC Compazine as needed. DC Phenergan as needed.     Sinus congestion/sinus pain.  Flonase.  Corticosporin eardrop.  ENT consult.  [place].  Dr. Nichols saw her and has wanted to do an audiogram, but she has declined to go over to his office the last 2 days.   CTA chest-No evidence of pulmonary embolus, Small LEFT pleural effusion, Bandlike areas of reticulation throughout both lungs likely representing scarring/fibrosis related to prior Covid 19 infection.     Hypertension/tachycardia.  Start Toprol.     History of pulmonary embolus.  Hold Xarelto possible surgery.  Start patient Lovenox therapeutic today.  She has not had Xarelto since the evening of .     Anxiety/depression/insomnia . Decrease Prozac nightly due to somnolence.   DC Ambien as needed due to somnolence.  DC trazodone due to some.    Headaches.    DC Fioricet as needed due to somnolence.        Pain.  DC Robaxin as needed due to somnolence. Morphine as needed.     Allergic rhinitis.  Flonase.     Nutrition.  Full liquid diet.    Deconditioning.  PT and OT consult.  Last time patient walk was in August of last year after Covid.  Patient is mostly bedbound at the nursing home.     Blood culture-contamination with coagulase negative Staphylococcus.  Urine culture shows Proteus and Klebsiella.  Covid-19-negative.  Flu screen A and B-negative.     Discharge Plannin to 6 days.  Patient is from a nursing home.      Electronically signed by Amado Villarreal MD, 04/10/22, 10:28 AM CDT.

## 2022-04-10 NOTE — PLAN OF CARE
Goal Outcome Evaluation:  Plan of Care Reviewed With: patient        Progress: no change  Outcome Evaluation: VSS. No change in neuro status this shift. Pt continues to be withdrawn, does follow some commands, and talks at times answers w/few words. Will even yell out at times when turned then stops. Purewick in place care done. Repositioning/turning, Safety maintained.

## 2022-04-10 NOTE — PLAN OF CARE
Goal Outcome Evaluation:  Plan of Care Reviewed With: patient        Progress: declining  Outcome Evaluation: Pt continues to be very lethargic, moans/groans when touched or turned/repositioned, unable to keep eyes open. Rarely speaks. responds to pain, localizes at times. CARLI orientation or n/t. Intake encouraged but pt refuses--was able to get her to take her meds this morning. IVF initiated. Tele - sinus tach. Elevated BP all day today - different BP meds given per MD order with little change. PRN IV labetalol given, will recheck BP. PPP. prevalon boots in place---heels elevated off bed, bilat foot drop. mild BLE edema. RN communication with MD today -- MD to speak with family, palliative care consult placed per MD. Q2 turning/repositioning with pillows, wedges. constipation, suppository and senokot given--awaiting results. Back and abd drsg, dry and intact. Call light within reach. Safety maintained. Alarm set.

## 2022-04-11 ENCOUNTER — APPOINTMENT (OUTPATIENT)
Dept: NEUROLOGY | Facility: HOSPITAL | Age: 47
End: 2022-04-11

## 2022-04-11 ENCOUNTER — APPOINTMENT (OUTPATIENT)
Dept: CT IMAGING | Facility: HOSPITAL | Age: 47
End: 2022-04-11

## 2022-04-11 LAB
ALBUMIN SERPL-MCNC: 2.3 G/DL (ref 3.5–5.2)
ALBUMIN/GLOB SERPL: 0.9 G/DL
ALP SERPL-CCNC: 87 U/L (ref 39–117)
ALT SERPL W P-5'-P-CCNC: 9 U/L (ref 1–33)
AMMONIA BLD-SCNC: 20 UMOL/L (ref 11–51)
ANION GAP SERPL CALCULATED.3IONS-SCNC: 10 MMOL/L (ref 5–15)
AST SERPL-CCNC: 20 U/L (ref 1–32)
BASOPHILS # BLD AUTO: 0.04 10*3/MM3 (ref 0–0.2)
BASOPHILS NFR BLD AUTO: 0.5 % (ref 0–1.5)
BILIRUB SERPL-MCNC: 0.4 MG/DL (ref 0–1.2)
BUN SERPL-MCNC: 7 MG/DL (ref 6–20)
BUN/CREAT SERPL: 10 (ref 7–25)
CALCIUM SPEC-SCNC: 8.3 MG/DL (ref 8.6–10.5)
CHLORIDE SERPL-SCNC: 106 MMOL/L (ref 98–107)
CO2 SERPL-SCNC: 23 MMOL/L (ref 22–29)
CREAT SERPL-MCNC: 0.7 MG/DL (ref 0.57–1)
DEPRECATED RDW RBC AUTO: 46.4 FL (ref 37–54)
EGFRCR SERPLBLD CKD-EPI 2021: 109.5 ML/MIN/1.73
EOSINOPHIL # BLD AUTO: 0.37 10*3/MM3 (ref 0–0.4)
EOSINOPHIL NFR BLD AUTO: 4.2 % (ref 0.3–6.2)
ERYTHROCYTE [DISTWIDTH] IN BLOOD BY AUTOMATED COUNT: 15.4 % (ref 12.3–15.4)
GLOBULIN UR ELPH-MCNC: 2.5 GM/DL
GLUCOSE SERPL-MCNC: 102 MG/DL (ref 65–99)
HCT VFR BLD AUTO: 28.5 % (ref 34–46.6)
HGB BLD-MCNC: 9.7 G/DL (ref 12–15.9)
IMM GRANULOCYTES # BLD AUTO: 0.08 10*3/MM3 (ref 0–0.05)
IMM GRANULOCYTES NFR BLD AUTO: 0.9 % (ref 0–0.5)
LYMPHOCYTES # BLD AUTO: 1.86 10*3/MM3 (ref 0.7–3.1)
LYMPHOCYTES NFR BLD AUTO: 20.9 % (ref 19.6–45.3)
MAGNESIUM SERPL-MCNC: 1.8 MG/DL (ref 1.6–2.6)
MCH RBC QN AUTO: 28.1 PG (ref 26.6–33)
MCHC RBC AUTO-ENTMCNC: 34 G/DL (ref 31.5–35.7)
MCV RBC AUTO: 82.6 FL (ref 79–97)
MONOCYTES # BLD AUTO: 0.66 10*3/MM3 (ref 0.1–0.9)
MONOCYTES NFR BLD AUTO: 7.4 % (ref 5–12)
NEUTROPHILS NFR BLD AUTO: 5.87 10*3/MM3 (ref 1.7–7)
NEUTROPHILS NFR BLD AUTO: 66.1 % (ref 42.7–76)
NRBC BLD AUTO-RTO: 0 /100 WBC (ref 0–0.2)
PLATELET # BLD AUTO: 256 10*3/MM3 (ref 140–450)
PMV BLD AUTO: 9.1 FL (ref 6–12)
POTASSIUM SERPL-SCNC: 3.3 MMOL/L (ref 3.5–5.2)
PROT SERPL-MCNC: 4.8 G/DL (ref 6–8.5)
RBC # BLD AUTO: 3.45 10*6/MM3 (ref 3.77–5.28)
SODIUM SERPL-SCNC: 139 MMOL/L (ref 136–145)
WBC NRBC COR # BLD: 8.88 10*3/MM3 (ref 3.4–10.8)

## 2022-04-11 PROCEDURE — 95819 EEG AWAKE AND ASLEEP: CPT

## 2022-04-11 PROCEDURE — 85025 COMPLETE CBC W/AUTO DIFF WBC: CPT | Performed by: FAMILY MEDICINE

## 2022-04-11 PROCEDURE — 70450 CT HEAD/BRAIN W/O DYE: CPT

## 2022-04-11 PROCEDURE — 82140 ASSAY OF AMMONIA: CPT | Performed by: CLINICAL NURSE SPECIALIST

## 2022-04-11 PROCEDURE — 99222 1ST HOSP IP/OBS MODERATE 55: CPT

## 2022-04-11 PROCEDURE — 80053 COMPREHEN METABOLIC PANEL: CPT | Performed by: FAMILY MEDICINE

## 2022-04-11 PROCEDURE — 82607 VITAMIN B-12: CPT | Performed by: CLINICAL NURSE SPECIALIST

## 2022-04-11 PROCEDURE — 99223 1ST HOSP IP/OBS HIGH 75: CPT | Performed by: PSYCHIATRY & NEUROLOGY

## 2022-04-11 PROCEDURE — 82746 ASSAY OF FOLIC ACID SERUM: CPT | Performed by: CLINICAL NURSE SPECIALIST

## 2022-04-11 PROCEDURE — 83735 ASSAY OF MAGNESIUM: CPT | Performed by: CLINICAL NURSE SPECIALIST

## 2022-04-11 PROCEDURE — 95819 EEG AWAKE AND ASLEEP: CPT | Performed by: PSYCHIATRY & NEUROLOGY

## 2022-04-11 RX ORDER — POTASSIUM CHLORIDE 750 MG/1
40 CAPSULE, EXTENDED RELEASE ORAL 2 TIMES DAILY
Status: COMPLETED | OUTPATIENT
Start: 2022-04-11 | End: 2022-04-11

## 2022-04-11 RX ORDER — METOPROLOL SUCCINATE 50 MG/1
50 TABLET, EXTENDED RELEASE ORAL
Status: DISCONTINUED | OUTPATIENT
Start: 2022-04-11 | End: 2022-04-16

## 2022-04-11 RX ORDER — SODIUM CHLORIDE, SODIUM LACTATE, POTASSIUM CHLORIDE, CALCIUM CHLORIDE 600; 310; 30; 20 MG/100ML; MG/100ML; MG/100ML; MG/100ML
75 INJECTION, SOLUTION INTRAVENOUS CONTINUOUS
Status: DISCONTINUED | OUTPATIENT
Start: 2022-04-11 | End: 2022-04-13

## 2022-04-11 RX ADMIN — METOPROLOL SUCCINATE 50 MG: 50 TABLET, FILM COATED, EXTENDED RELEASE ORAL at 08:51

## 2022-04-11 RX ADMIN — NEOMYCIN SULFATE, POLYMYXIN B SULFATE AND HYDROCORTISONE 4 DROP: 10; 3.5; 1 SUSPENSION/ DROPS AURICULAR (OTIC) at 23:29

## 2022-04-11 RX ADMIN — NEOMYCIN SULFATE, POLYMYXIN B SULFATE AND HYDROCORTISONE 4 DROP: 10; 3.5; 1 SUSPENSION/ DROPS AURICULAR (OTIC) at 11:48

## 2022-04-11 RX ADMIN — PANTOPRAZOLE SODIUM 40 MG: 40 TABLET, DELAYED RELEASE ORAL at 17:33

## 2022-04-11 RX ADMIN — NEOMYCIN SULFATE, POLYMYXIN B SULFATE AND HYDROCORTISONE 4 DROP: 10; 3.5; 1 SUSPENSION/ DROPS AURICULAR (OTIC) at 00:30

## 2022-04-11 RX ADMIN — Medication 10 ML: at 20:20

## 2022-04-11 RX ADMIN — SODIUM BICARBONATE 650 MG: 650 TABLET ORAL at 20:16

## 2022-04-11 RX ADMIN — SODIUM BICARBONATE 100 MEQ: 84 INJECTION, SOLUTION INTRAVENOUS at 03:53

## 2022-04-11 RX ADMIN — DOCUSATE SODIUM 50 MG AND SENNOSIDES 8.6 MG 2 TABLET: 8.6; 5 TABLET, FILM COATED ORAL at 08:51

## 2022-04-11 RX ADMIN — FLUTICASONE PROPIONATE 2 SPRAY: 50 SPRAY, METERED NASAL at 08:54

## 2022-04-11 RX ADMIN — SODIUM BICARBONATE 650 MG: 650 TABLET ORAL at 08:51

## 2022-04-11 RX ADMIN — PANTOPRAZOLE SODIUM 40 MG: 40 TABLET, DELAYED RELEASE ORAL at 08:51

## 2022-04-11 RX ADMIN — NEOMYCIN SULFATE, POLYMYXIN B SULFATE AND HYDROCORTISONE 4 DROP: 10; 3.5; 1 SUSPENSION/ DROPS AURICULAR (OTIC) at 06:31

## 2022-04-11 RX ADMIN — Medication 10 ML: at 08:53

## 2022-04-11 RX ADMIN — FLUTICASONE PROPIONATE 2 SPRAY: 50 SPRAY, METERED NASAL at 20:17

## 2022-04-11 RX ADMIN — POTASSIUM CHLORIDE 40 MEQ: 10 CAPSULE, COATED, EXTENDED RELEASE ORAL at 11:48

## 2022-04-11 RX ADMIN — RIVAROXABAN 20 MG: 20 TABLET, FILM COATED ORAL at 20:16

## 2022-04-11 RX ADMIN — Medication 2 SPRAY: at 08:54

## 2022-04-11 RX ADMIN — FLUOXETINE HYDROCHLORIDE 20 MG: 20 CAPSULE ORAL at 20:16

## 2022-04-11 RX ADMIN — NEOMYCIN SULFATE, POLYMYXIN B SULFATE AND HYDROCORTISONE 4 DROP: 10; 3.5; 1 SUSPENSION/ DROPS AURICULAR (OTIC) at 17:33

## 2022-04-11 RX ADMIN — POTASSIUM CHLORIDE 40 MEQ: 10 CAPSULE, COATED, EXTENDED RELEASE ORAL at 20:16

## 2022-04-11 RX ADMIN — BISACODYL 10 MG: 10 SUPPOSITORY RECTAL at 08:51

## 2022-04-11 RX ADMIN — SODIUM CHLORIDE, POTASSIUM CHLORIDE, SODIUM LACTATE AND CALCIUM CHLORIDE 75 ML/HR: 600; 310; 30; 20 INJECTION, SOLUTION INTRAVENOUS at 11:48

## 2022-04-11 RX ADMIN — Medication 2 SPRAY: at 20:17

## 2022-04-11 NOTE — CASE MANAGEMENT/SOCIAL WORK
Continued Stay Note  [facility]     Patient Name: Namita Zabala  MRN: [id number]  Today's Date: 4/11/2022    Admit Date: 4/2/2022     Discharge Plan     Row Name 04/11/22 1113       Plan    Plan [facility] REQUESTING [facility] REFERRAL FOR CONTINUED NEED POST ACUTE CARE. ANTIBX/THERAPY. MAU WITH [facility] NOTIFIED. PENDING BED OFFER.               Discharge Codes    No documentation.                     Mi Hardy RN

## 2022-04-11 NOTE — PAYOR COMM NOTE
"AUTH: [id number]    Namita Cooper (46 y.o. Female)             Date of Birth   1975    Social Security Number       Address   [address] [place] [address]    Home Phone   [phone]    MRN   [id number]       [facility]    Marital Status                               Admission Date   4/2/22    Admission Type   Emergency    Admitting Provider   Amado Villarreal MD    Attending Provider   Amado Villarreal MD    Department, Room/Bed   [facility] 3A, 346/1       Discharge Date       Discharge Disposition       Discharge Destination                               Attending Provider: Amado Villarreal MD    Allergies: Coconut, Nuts, Penicillins, [place]    Isolation: Contact   Infection: COVID (History) (09/15/21), ESBL Klebsiella (04/07/22)   Code Status: CPR   Advance Care Planning Activity    Ht: 170.2 cm (67\")   Wt: 71.6 kg (157 lb 14.4 oz)    Admission Cmt: None   Principal Problem: Intractable nausea and vomiting [R11.2]                 Active Insurance as of 4/2/2022     Primary Coverage     Payor Plan Insurance Group Employer/Plan Group    [employer]HEM BLUE CROSS ANTHEM BLUE CROSS BLUE SHIELD PPO [id number]     Payor Plan Address Payor Plan Phone Number Payor Plan Fax Number Effective Dates    [address] [phone]  1/1/2022 - None Entered    [facility] [id number]       Subscriber Name Subscriber Birth Date Member ID       GRANT COOPER GEMA 1975 [id number]           Secondary Coverage     Payor Plan Insurance Group Employer/Plan Group    MEDICARE MEDICARE A & B      Payor Plan Address Payor Plan Phone Number Payor Plan Fax Number Effective Dates    [address] [phone]  7/1/2019 - None Entered    [facility] [id number]       Subscriber Name Subscriber Birth Date Member ID       NAMITA COOPER N 1975 [id number]                 Emergency Contacts      (Rel.) Home Phone Work Phone Mobile Phone    Grant Cooper (Spouse) -- -- [phone]    " Noel Johnson (Father) [phone] -- [phone]    Marquita Johnson (Mother) -- -- [phone]    Neida Zabala -- -- [phone]               Physician Progress Notes (last 72 hours)      Amado Villarreal MD at 04/10/22 1028              [facility] Medicine Services  INPATIENT PROGRESS NOTE    Length of Stay: 6  Date of Admission: 4/2/2022  Primary Care Physician: David Lara MD    Subjective   Chief Complaint: Nausea vomiting/dysfunction gallbladder/malfunction nephrostomy tube.    HPI   No sign nausea vomiting.  Patient tolerated full liquid diet.  Discussion is to verify if surgery can be done by general surgery for the gallbladder.  If not, patient will need to be back on Xarelto.  Patient no acute distress.  Patient very somnolent.    Review of Systems   Unable to obtain due to somnolence.  Patient no acute distress.    All pertinent negatives and positives are as above. All other systems have been reviewed and are negative unless otherwise stated.     Objective    Temp:  [97 °F (36.1 °C)-98.5 °F (36.9 °C)] 97.8 °F (36.6 °C)  Heart Rate:  [110-121] 121  Resp:  [18-20] 18  BP: (131-160)/() 160/120    Intake/Output Summary (Last 24 hours) at 4/10/2022 1028  Last data filed at 4/10/2022 0828  Gross per 24 hour   Intake --   Output 600 ml   Net -600 ml     Physical Exam  Vitals and nursing note reviewed.   Constitutional:       Appearance: She is well-developed.   HENT:      Head: Normocephalic.   Eyes:      Conjunctiva/sclera: Conjunctivae normal.      Pupils: Pupils are equal, round, and reactive to light.   Neck:      Vascular: No JVD.   Cardiovascular:      Rate and Rhythm: Regular rhythm. Tachycardia present.      Heart sounds: Normal heart sounds. No murmur heard.    No friction rub. No gallop.   Pulmonary:      Effort: No respiratory distress.      Breath sounds: No wheezing or rales.      Comments: Diminished breath sound bilateral, clear .  Chest:       Chest wall: No tenderness.   Abdominal:      General: Bowel sounds are normal. There is no distension.      Palpations: Abdomen is soft.      Tenderness: There is no abdominal tenderness. There is no guarding or rebound.   Musculoskeletal:         General: No tenderness or deformity.      Cervical back: Neck supple.   Skin:     General: Skin is warm and dry.      Capillary Refill: Capillary refill takes 2 to 3 seconds.      Findings: No rash.   Neurological:      Cranial Nerves: No cranial nerve deficit.      Motor: Weakness present. No abnormal muscle tone.      Coordination: Coordination abnormal.      Gait: Gait abnormal.      Deep Tendon Reflexes: Reflexes normal.   Psychiatric:         Behavior: Behavior normal.         Thought Content: Thought content normal.      Results Review:  Lab Results (last 24 hours)     Procedure Component Value Units Date/Time    Lipid Panel [[id number]]  (Abnormal) Collected: 04/10/22 0920    Specimen: Blood Updated: 04/10/22 0954     Total Cholesterol 164 mg/dL      Triglycerides 144 mg/dL      HDL Cholesterol 64 mg/dL      LDL Cholesterol  75 mg/dL      VLDL Cholesterol 25 mg/dL      LDL/HDL Ratio 1.11    Narrative:      Cholesterol Reference Ranges  (U.S. Department of Health and Human Services ATP III Classifications)    Desirable          <200 mg/dL  Borderline High    200-239 mg/dL  High Risk          >240 mg/dL      Triglyceride Reference Ranges  (U.S. Department of Health and Human Services ATP III Classifications)    Normal           <150 mg/dL  Borderline High  150-199 mg/dL  High             200-499 mg/dL  Very High        >500 mg/dL    HDL Reference Ranges  (U.S. Department of Health and Human Services ATP III Classifications)    Low     <40 mg/dl (major risk factor for CHD)  High    >60 mg/dl ('negative' risk factor for CHD)        LDL Reference Ranges  (U.S. Department of Health and Human Services ATP III Classifications)    Optimal          <100 mg/dL  Near Optimal      100-129 mg/dL  Borderline High  130-159 mg/dL  High             160-189 mg/dL  Very High        >189 mg/dL    Basic Metabolic Panel [[id number]]  (Abnormal) Collected: 04/10/22 0920    Specimen: Blood Updated: 04/10/22 0954     Glucose 101 mg/dL      BUN 11 mg/dL      Creatinine 0.82 mg/dL      Sodium 139 mmol/L      Potassium 4.8 mmol/L      Comment: Slight hemolysis detected by analyzer. Results may be affected.        Chloride 108 mmol/L      CO2 15.0 mmol/L      Calcium 9.1 mg/dL      BUN/Creatinine Ratio 13.4     Anion Gap 16.0 mmol/L      eGFR 90.6 mL/min/1.73      Comment: National Kidney Foundation and American Society of Nephrology (ASN) Task Force recommended calculation based on the Chronic Kidney Disease Epidemiology Collaboration (CKD-EPI) equation refit without adjustment for race.       Narrative:      GFR Normal >60  Chronic Kidney Disease <60  Kidney Failure <15      CBC (No Diff) [[id number]]  (Abnormal) Collected: 04/10/22 0920    Specimen: Blood Updated: 04/10/22 0931     WBC 11.73 10*3/mm3      RBC 3.68 10*6/mm3      Hemoglobin 10.3 g/dL      Hematocrit 30.7 %      MCV 83.4 fL      MCH 28.0 pg      MCHC 33.6 g/dL      RDW 15.6 %      RDW-SD 46.8 fl      MPV 9.1 fL      Platelets 241 10*3/mm3            Cultures:  No results found for: BLOODCX, URINECX, WOUNDCX, MRSACX, RESPCX, STOOLCX    Radiology Data:    Imaging Results (Last 24 Hours)     ** No results found for the last 24 hours. **          Allergies   Allergen Reactions   • Coconut Unknown - High Severity   • Nuts Unknown - High Severity   • Penicillins    • [place] Other (See Comments)     Causes migraines per pt reports       Scheduled meds:   FLUoxetine, 40 mg, Oral, Daily  fluticasone, 2 spray, Each Nare, BID  metoclopramide, 5 mg, Oral, 4x Daily AC & at Bedtime  metoprolol succinate XL, 50 mg, Oral, Q24H  neomycin-polymyxin-hydrocortisone, 4 drop, Both Ears, Q6H  oxymetazoline, 2 spray, Nasal, BID  pantoprazole, 40 mg, Oral, BID  AC  sodium bicarbonate, 650 mg, Oral, BID  sodium chloride, 10 mL, Intravenous, Q12H  SUMAtriptan, 50 mg, Oral, Once  traZODone, 100 mg, Oral, Nightly        PRN meds:  •  butalbital-acetaminophen-caffeine  •  methocarbamol  •  Morphine  •  ondansetron **OR** ondansetron  •  promethazine  •  sodium chloride  •  [COMPLETED] Insert peripheral IV **AND** sodium chloride  •  [COMPLETED] Insert peripheral IV **AND** sodium chloride  •  sodium chloride  •  zolpidem    Assessment/Plan       Intractable nausea and vomiting    Sepsis secondary to UTI (HCC)    Lactic acidosis    Hypokalemia    Essential (primary) hypertension    UTI (urinary tract infection), bacterial    Malfunction of nephrostomy tube (HCC)    Severe malnutrition (CMS/HCC)    Hypophosphatemia      Plan:  HPI.  Patient was admitted on 4/3 by Dr. Ricks.  Had COVID-19 here last year and ended up having an abdominal hematoma and had to go to [place].  Was there for a long time and then an LTAC and then a skilled nursing facility.  Comes back with multidrug-resistant UTI on Invanz.   She presented with complaints of intractable nausea and vomiting.  Her medical problems started last August when she developed COVID-19 pneumonia and had numerous complications thereafter.  She developed an abdominal hematoma and had to go on dialysis secondary to extrinsic compression of her urinary system. She was transferred from here to [facility] and required exploratory laparotomy.  She ended up having bilateral percutaneous nephrostomy tubes and also ended up with a left ureteral stent.  She states that her main urologist is Dr. Rina Rey.  She states that she stopped producing urine from her percutaneous nephrostomy tube recently and that there are plans for it to be removed.  She states that she had gotten in touch with [place] to see if someone here could do it so she would not have to travel.  When she left [place], she spent considerable time  at a skilled nursing facility in [place], [place].  She states that she has only been home a few weeks.  She was admitted to [facility] in [place] on 3/9 for electrolyte abnormalities.     UTI.  Renal function stable yesterday with a creatinine 0.76.  Dr. Ricks began treating a urinary tract infection with ceftriaxone.  She had a multidrug-resistant Klebsiella in October 2021.  [facility].  Lactate down to 1.7 from 2.8 after fluids.  Urine does appear infected with too numerous to count white blood cells, 4+ bacteria and large leukocyte esterase. The sample was also bloody.  Urine culture has grown 2 distinct pathogens.  There is a multidrug-resistant Proteus mirabilis strain and an ESBL Klebsiella strain.  Invanz should be treating both of these organisms concurrently.  Today is day 5 of Invanz.  We will treat 7-10 days.  Lactic acidosis improved after fluids and antibiotics.  Procalcitonin elevated at 0.31.  Dr. Dyer is familiar with her previous problems as he saw her last year.  I consulted him to evaluate her.  He wants to treat her current infection and then have her follow back at [place].  On Invanz.  Recommendation from urology-  Urology saw her and wants her to go back to [place] to have her percutaneous nephrostomy pulled.     Hypokalemia .  Resolved.      Sludge in gallbladder/dysfunctional gallbladder.  General surgery consult.  GI consult. No surgery per general surgery.  CT scan abdomen pelvic-persistent mild but improved urothelial thickening involving the left renal pelvis and proximal left ureter- related to resolving UTI,  internal/external left-sided nephroureterostomy tube appears in satisfactory position, mild left-sided hydronephrosis which is decreased, Small amount gas is noted in the bladder,  No significant bladder wall thickening is identified, Small left pleural effusion- Increased hazy airspace opacities in the lung bases favored to  represent diffuse atelectasis,  Based on the volume of gas within the GI tract- mild ileus may be present,  No evidence of bowel obstruction, Small amount fluid within the upper vagina where previously there was a small amount of gas, Hepatic steatosis.  HIDA scan-20% biliary ejection fraction.  Ultrasound abdomen pelvic-Sludge-filled gallbladder with no significant gallbladder wall thickening, pericholecystic fluid or convincing evidence of acute cholecystitis, Mild dilation of the common hepatic duct with no visualized choledocholithiasis, Hepatic steatosis.     Nausea/vomiting.  DC Reglan.  Protonix.  Bicarb. DC Inapsine as needed.  DC Compazine as needed. DC Phenergan as needed.     Sinus congestion/sinus pain.  Flonase.  Corticosporin eardrop.  ENT consult.  [place].  Dr. Nichols saw her and has wanted to do an audiogram, but she has declined to go over to his office the last 2 days.   CTA chest-No evidence of pulmonary embolus, Small LEFT pleural effusion, Bandlike areas of reticulation throughout both lungs likely representing scarring/fibrosis related to prior Covid 19 infection.     Hypertension/tachycardia.  Start Toprol.     History of pulmonary embolus.  Hold Xarelto possible surgery.  Start patient Lovenox therapeutic today.  She has not had Xarelto since the evening of 4/4.     Anxiety/depression/insomnia . Decrease Prozac nightly due to somnolence.   DC Ambien as needed due to somnolence.  DC trazodone due to some.    Headaches.    DC Fioricet as needed due to somnolence.        Pain.  DC Robaxin as needed due to somnolence. Morphine as needed.     Allergic rhinitis.  Flonase.     Nutrition.  Full liquid diet.    Deconditioning.  PT and OT consult.  Last time patient walk was in August of last year after Covid.  Patient is mostly bedbound at the nursing home.     Blood culture-contamination with coagulase negative Staphylococcus.  Urine culture shows Proteus and Klebsiella.  Covid-19-negative.  Flu screen  A and B-negative.     Discharge Plannin to 6 days.  Patient is from a nursing home.      Electronically signed by Amado Villarreal MD, 04/10/22, 10:28 AM CDT.                    Electronically signed by Amado Villarreal MD at 04/10/22 1653     Amado Villarreal MD at 22 0751              [facility] Medicine Services  INPATIENT PROGRESS NOTE    Length of Stay: 5  Date of Admission: 2022  Primary Care Physician: David Lara MD    Subjective   Chief Complaint: Nausea vomiting/dysfunction gallbladder/malfunction nephrostomy tube.    HPI   Blood pressure stable, slightly tacky.  Patient is extremely somnolent.  Patient wake up and go right back to sleep.  Patient is afebrile.  Creatinine is normal.  Good urine output.  Patient is currently on room air.  Patient no acute distress.    Review of Systems   Unable to obtain due to somnolence.  Patient no acute distress.    All pertinent negatives and positives are as above. All other systems have been reviewed and are negative unless otherwise stated.     Objective    Temp:  [97.6 °F (36.4 °C)-98.3 °F (36.8 °C)] 97.6 °F (36.4 °C)  Heart Rate:  [109-116] 116  Resp:  [16-18] 18  BP: (132-142)/(72-93) 135/91    Intake/Output Summary (Last 24 hours) at 2022 0751  Last data filed at 2022 0457  Gross per 24 hour   Intake 50 ml   Output 1250 ml   Net -1200 ml     Physical Exam  Vitals and nursing note reviewed.   Constitutional:       Appearance: She is well-developed.   HENT:      Head: Normocephalic.   Eyes:      Conjunctiva/sclera: Conjunctivae normal.      Pupils: Pupils are equal, round, and reactive to light.   Neck:      Vascular: No JVD.   Cardiovascular:      Rate and Rhythm: Regular rhythm. Tachycardia present.      Heart sounds: Normal heart sounds. No murmur heard.    No friction rub. No gallop.   Pulmonary:      Effort: No respiratory distress.      Breath sounds: No wheezing or rales.      Comments:  Diminished breath sound bilateral, clear .  Chest:      Chest wall: No tenderness.   Abdominal:      General: Bowel sounds are normal. There is no distension.      Palpations: Abdomen is soft.      Tenderness: There is no abdominal tenderness. There is no guarding or rebound.   Musculoskeletal:         General: No tenderness or deformity.      Cervical back: Neck supple.   Skin:     General: Skin is warm and dry.      Capillary Refill: Capillary refill takes 2 to 3 seconds.      Findings: No rash.   Neurological:      Cranial Nerves: No cranial nerve deficit.      Motor: Weakness present. No abnormal muscle tone.      Coordination: Coordination abnormal.      Gait: Gait abnormal.      Deep Tendon Reflexes: Reflexes normal.   Psychiatric:         Behavior: Behavior normal.         Thought Content: Thought content normal.         Results Review:  Lab Results (last 24 hours)     ** No results found for the last 24 hours. **           Cultures:  Blood Culture   Date Value Ref Range Status   04/02/2022 No growth at 5 days  Final   04/02/2022 Staphylococcus, coagulase negative (C)  Final     Urine Culture   Date Value Ref Range Status   04/02/2022 >100,000 CFU/mL Proteus mirabilis (A)  Final   04/02/2022 >100,000 CFU/mL Klebsiella oxytoca ESBL (A)  Final     Comment:     Consider infectious disease consult.  Susceptibility results may not correlate to clinical outcomes.       Radiology Data:    Imaging Results (Last 24 Hours)     ** No results found for the last 24 hours. **          Allergies   Allergen Reactions   • Coconut Unknown - High Severity   • Nuts Unknown - High Severity   • Penicillins    • [place] Other (See Comments)     Causes migraines per pt reports       Scheduled meds:   ertapenem, 1 g, Intravenous, Q24H  FLUoxetine, 40 mg, Oral, Daily  fluticasone, 2 spray, Each Nare, BID  metoclopramide, 5 mg, Intravenous, Q6H  neomycin-polymyxin-hydrocortisone, 4 drop, Both Ears, Q6H  oxymetazoline, 2 spray, Nasal,  BID  pantoprazole, 40 mg, Intravenous, Q12H  sodium bicarbonate, 650 mg, Oral, BID  sodium chloride, 10 mL, Intravenous, Q12H  SUMAtriptan, 50 mg, Oral, Once  traZODone, 100 mg, Oral, Nightly        PRN meds:  •  butalbital-acetaminophen-caffeine  •  droperidol  •  methocarbamol  •  Morphine  •  ondansetron **OR** ondansetron  •  prochlorperazine  •  promethazine  •  sodium chloride  •  [COMPLETED] Insert peripheral IV **AND** sodium chloride  •  [COMPLETED] Insert peripheral IV **AND** sodium chloride  •  sodium chloride  •  zolpidem    Assessment/Plan       Intractable nausea and vomiting    Sepsis secondary to UTI (HCC)    Lactic acidosis    Hypokalemia    Essential (primary) hypertension    UTI (urinary tract infection), bacterial    Malfunction of nephrostomy tube (HCC)    Severe malnutrition (CMS/HCC)    Hypophosphatemia      Plan:  HPI.  Patient was admitted on 4/3 by Dr. Ricks.  Had COVID-19 here last year and ended up having an abdominal hematoma and had to go to [place].  Was there for a long time and then an LTAC and then a skilled nursing facility.  Comes back with multidrug-resistant UTI on Invanz.   She presented with complaints of intractable nausea and vomiting.  Her medical problems started last August when she developed COVID-19 pneumonia and had numerous complications thereafter.  She developed an abdominal hematoma and had to go on dialysis secondary to extrinsic compression of her urinary system. She was transferred from here to [facility] [place], operated by [facility] and required exploratory laparotomy.  She ended up having bilateral percutaneous nephrostomy tubes and also ended up with a left ureteral stent.  She states that her main urologist is Dr. Rina Rey.  She states that she stopped producing urine from her percutaneous nephrostomy tube recently and that there are plans for it to be removed.  She states that she had gotten in touch with [place] to see if someone here could do it so she would  "not have to travel.  When she left [place], she spent considerable time at a skilled nursing facility in [place], [place].  She states that she has only been home a few weeks.  She was admitted to [facility] in [place] on 3/9 for electrolyte abnormalities.    UTI.  Renal function stable yesterday with a creatinine 0.76.  Dr. Ricks began treating a urinary tract infection with ceftriaxone.  She had a multidrug-resistant Klebsiella in October 2021.  I transitioned her to [facility] on 4/3.  Lactate down to 1.7 from 2.8 after fluids.  Urine does appear infected with too numerous to count white blood cells, 4+ bacteria and large leukocyte esterase. The sample was also bloody.  Urine culture has grown 2 distinct pathogens.  There is a multidrug-resistant Proteus mirabilis strain and an ESBL Klebsiella strain.  Invanz should be treating both of these organisms concurrently.  [facility].  We will treat 7-10 days.  Lactic acidosis improved after fluids and antibiotics.  Procalcitonin elevated at 0.31.  Dr. Dyer is familiar with her previous problems as he saw her last year.  I consulted him to evaluate her.  He wants to treat her current infection and then have her follow back at [place].  On Invanz.  Recommendation from urology-  Urology saw her and wants her to go back to [place] to have her percutaneous nephrostomy pulled.    Hypokalemia . Multiple runs ordered and it is also in her fluids. \"I have always had potassium problems.\"  Potassium now 5.0.  Recent replacement of phosphorus and potassium with IV potassium phosphate.  Will replace phosphorus today with IV sodium phosphate.  Started oral sodium bicarbonate on 4/5.    Sludge in gallbladder/dysfunctional gallbladder.  Dr. Babin saw and recommended an abdominal ultrasound.  There is a sludge-filled gallbladder with no significant gallbladder wall thickening, pericholecystic fluid, or convincing evidence of acute " cholecystitis.  Mild dilatation of the common hepatic duct with no visualized choledocholithiasis. HIDA scan shows an EF of 20%. Consulted general surgery to consider laparoscopic cholecystectomy given her sludge-filled gallbladder and equivocal ejection fraction.  Dr. Alicia Damon is interested in doing this, but would like to obtain new CT scans today and also have her off anticoagulation a bit longer.  CT scan abdomen pelvic-persistent mild but improved urothelial thickening involving the left renal pelvis and proximal left ureter- related to resolving UTI,  internal/external left-sided nephroureterostomy tube appears in satisfactory position, mild left-sided hydronephrosis which is decreased, Small amount gas is noted in the bladder,  No significant bladder wall thickening is identified, Small left pleural effusion- Increased hazy airspace opacities in the lung bases favored to represent diffuse atelectasis,  Based on the volume of gas within the GI tract- mild ileus may be present,  No evidence of bowel obstruction, Small amount fluid within the upper vagina where previously there was a small amount of gas, Hepatic steatosis.  HIDA scan-20% biliary ejection fraction.  Ultrasound abdomen pelvic-Sludge-filled gallbladder with no significant gallbladder wall thickening, pericholecystic fluid or convincing evidence of acute cholecystitis, Mild dilation of the common hepatic duct with no visualized choledocholithiasis, Hepatic steatosis.    Nausea/vomiting.  Reglan.  Protonix.  Bicarb.  Inapsine as needed.  Compazine as needed.  Phenergan as needed.    Sinus congestion/sinus pain.  Flonase.  Corticosporin eardrop.  ENT consult.  [place].  Dr. Nichols saw her and has wanted to do an audiogram, but she has declined to go over to his office the last 2 days.   CTA chest-No evidence of pulmonary embolus, Small LEFT pleural effusion, Bandlike areas of reticulation throughout both lungs likely representing scarring/fibrosis  related to prior Covid 19 infection.      History of pulmonary embolus.  Hold Xarelto possible surgery.  Dr. Damon would like for her to be off all anticoagulation for the time being.  She has not had Xarelto since the evening of  and has not had therapeutic Lovenox since 1233 on .    Anxiety/depression/insomnia . Prozac.  Prozac nightly.  Headaches.  Fioricet as needed.  Ambien as needed.    Pain.  Robaxin as needed.  Morphine as needed.    Allergic rhinitis.  Flonase.    Nutrition.  Full liquid diet.    Blood culture-contamination with coagulase negative Staphylococcus.  Urine culture shows Proteus and Klebsiella.  Covid-19-negative.  Flu screen A and B-negative.    Discharge Plannin to 6 days.    Electronically signed by Amado Villarreal MD, 22, 7:51 AM CDT.                    Electronically signed by Amado Villarreal MD at 22 0954     Fermin Mcclure DO at 22 1213              [facility] Medicine Services  INPATIENT PROGRESS NOTE    Patient Name: Namita Zabala  Date of Admission: 2022  Today's Date: 22  Length of Stay: 4  Primary Care Physician: David Lara MD    Subjective   Chief Complaint: Persistent nausea.   HPI  Wanted to sleep and did not go to Dr. Nichols' office for her audiogram at 11 AM. She also missed her appointment yesterday.  The nurses also wonder how we will get her over there.  She has refused to get into the wheelchair for other test and they are not convinced that they can get her into the office in her hospital bed.  I do not find this testing to be emergent by any means this so we will figure out a way to obtain this eventually.    Dr. Damon got new CTs yesterday.  No pulmonary embolus.  Some scarring and fibrosis from COVID-19 infection.  Urinary issues look better.  She has not had any Xarelto since  and has not had any Lovenox since .  She remains nothing by mouth at present and may go for  laparoscopic cholecystectomy today.    No new complaints or major changes.  The patient continues to not participate in her own care.    Review of Systems  All pertinent negatives and positives are as above. All other systems have been reviewed and are negative unless otherwise stated.     Objective    Temp:  [97.5 °F (36.4 °C)-98.1 °F (36.7 °C)] 98.1 °F (36.7 °C)  Heart Rate:  [107-112] 112  Resp:  [16-18] 18  BP: (120-140)/(64-80) 140/78  Physical Exam  Constitutional:       Appearance: She is well-developed.      Comments: Up in bed.  Awoken from sleep.  No distress.  No family present.  Discussed with her nurse, Ivette, and the charge nurse, Virginia.   HENT:      Head: Normocephalic and atraumatic.   Eyes:      Conjunctiva/sclera: Conjunctivae normal.      Pupils: Pupils are equal, round, and reactive to light.   Neck:      Vascular: No JVD.   Cardiovascular:      Rate and Rhythm: Normal rate and regular rhythm.      Heart sounds: Normal heart sounds.   Pulmonary:      Effort: Pulmonary effort is normal. No respiratory distress.      Breath sounds: Normal breath sounds.   Abdominal:      General: Bowel sounds are normal. There is no distension.      Palpations: Abdomen is soft.      Tenderness: There is no abdominal tenderness.   Musculoskeletal:         General: No tenderness or deformity. Normal range of motion.      Cervical back: Neck supple.   Skin:     General: Skin is warm and dry.      Findings: No rash.   Neurological:      General: No focal deficit present.      Mental Status: She is alert and oriented to person, place, and time.      Cranial Nerves: No cranial nerve deficit.      Motor: Weakness present. No abnormal muscle tone.      Deep Tendon Reflexes: Reflexes normal.   Psychiatric:      Comments: Flat affect. Seems to want to be left alone.        Results Review:  I have reviewed the labs, radiology results, and diagnostic studies.    Laboratory Data:   Results from last 7 days   Lab Units  04/08/22  0618 04/07/22  0713 04/05/22  0749   WBC 10*3/mm3 9.71 7.85 7.64   HEMOGLOBIN g/dL 10.1* 10.6* 9.6*   HEMATOCRIT % 30.8* 32.3* 30.1*   PLATELETS 10*3/mm3 284 255 303     Results from last 7 days   Lab Units 04/08/22  0618 04/07/22  0713 04/05/22  0748 04/03/22  1824 04/03/22  0713   SODIUM mmol/L 137 139 141   < > 137   POTASSIUM mmol/L 3.7 5.0 4.4   < > 2.5*   CHLORIDE mmol/L 107 110* 112*   < > 97*   CO2 mmol/L 19.0* 17.0* 18.0*   < > 25.0   BUN mg/dL 7 6 7   < > 13   CREATININE mg/dL 0.77 0.76 0.71   < > 0.95   CALCIUM mg/dL 8.5* 8.2* 7.4*   < > 8.7   BILIRUBIN mg/dL 0.4 0.3  --   --  0.6   ALK PHOS U/L 97 91  --   --  100   ALT (SGPT) U/L 12 12  --   --  13   AST (SGOT) U/L 25 25  --   --  18   GLUCOSE mg/dL 82 76 89   < > 100*    < > = values in this interval not displayed.     Imaging Results (Last 48 Hours)     Procedure Component Value Units Date/Time    CT Abdomen Pelvis With Contrast [[id number]] Collected: 04/07/22 1628     Updated: 04/07/22 1641    Narrative:      EXAMINATION: CT ABDOMEN PELVIS W CONTRAST- 4/7/2022 4:28 PM CDT     HISTORY: hx bladder repair, cholecystitis; E87.6-Hypokalemia;  N39.0-Urinary tract infection, site not specified; R31.9-Hematuria,  unspecified; R11.2-Nausea with vomiting, unspecified; E87.2-Acidosis     DOSE: 268 mGycm (Automatic exposure control technique was implemented in  an effort to keep the radiation dose as low as possible without  compromising image quality)     REPORT: Spiral CT of the abdomen and pelvis was performed after  administration of intravenous contrast from the lung bases through the  pubic symphysis. Reconstructed coronal and sagittal images are also  reviewed.     COMPARISON: CT abdomen pelvis with contrast 4/2/2022.     Review of lung windows demonstrates a small left pleural effusion, there  is hazy airspace opacity in both lung bases, mostly dependent and felt  to be related to atelectasis. No consolidation is identified. No  pneumothorax is  seen. There is decreased attenuation of the liver  parenchyma diffusely compatible with steatosis. The gallbladder is  mildly distended. No gallstones are visualized. The spleen is  homogeneous, normal in size. There is moderate distention of the stomach  with gas and some oral contrast is present in the fundus. The pancreas  and adrenal glands are within normal limits. The kidneys enhance  normally, on the right, there is no hydronephrosis in the ureters are  decompressed. On the left, there is an indwelling percutaneous  nephroureterostomy tube, which appears to be satisfactory position as  before. There is mild left-sided hydronephrosis, this is improved. There  is less urothelial thickening involving the left renal pelvis. A small  volume of air is noted in the nondependent bladder. There is fluid  within the upper vagina, where previously a small amount of gas was  visualized. No free fluid or free air is identified. No intra-abdominal  abscess is identified. There is distortion of the abdominal wall in the  pelvis as before likely related to previous surgery, with scar tissue.  Bowel loops are normal caliber, based on the volume of gas within the GI  tract, mild ileus may be present. There is no bowel wall thickening.  Review of bone windows is unremarkable.       Impression:      1. There is persistent mild but improved urothelial thickening involving  the left renal pelvis and proximal left ureter, which may be related to  resolving UTI. The internal/external left-sided nephroureterostomy tube  appears in satisfactory position. There is mild left-sided  hydronephrosis which is decreased. Small amount gas is noted in the  bladder. No significant bladder wall thickening is identified.  2. Small left pleural effusion. Increased hazy airspace opacities in the  lung bases favored to represent diffuse atelectasis. Based on the volume  of gas within the GI tract, mild ileus may be present. There is no  evidence of  bowel obstruction.  3. Small amount fluid within the upper vagina, where previously there  was a small amount of gas.  4. Hepatic steatosis.        This report was finalized on 04/07/2022 16:38 by Dr. Florian Laurent MD.    CT Angiogram Chest With & Without Contrast [[id number]] Collected: 04/07/22 1624     Updated: 04/07/22 1634    Narrative:      EXAM: CT ANGIOGRAM CHEST W WO CONTRAST-     INDICATION: History PEs due to COVID; E87.6-Hypokalemia; N39.0-Urinary  tract infection, site not specified; R31.9-Hematuria, unspecified;  R11.2-Nausea with vomiting, unspecified; E87.2-Acidosis     COMPARISON: 9/20/2021     DLP: 593 mGy cm. Automated exposure control was also utilized to  decrease patient radiation dose.     FINDINGS:     No evidence of pulmonary embolus. Main pulmonary artery is nondilated.  Thoracic aorta is nonaneurysmal. No evidence of aortic dissection. Trace  pericardial fluid. Heart is mildly enlarged.     Central airways are clear. Bilateral bandlike areas of reticulation  likely representing scarring/fibrosis from prior Covid 19 infection.  Small LEFT pleural effusion. No definite focal area of consolidation. No  suspicious pulmonary nodule. No pneumothorax. No enlarged axillary,  supraclavicular, mediastinal, or hilar lymph nodes.     No acute chest wall soft tissue abnormality. Findings in the included  upper abdomen will be described in a separate same-day report. No acute  osseous finding.       Impression:         1.  No evidence of pulmonary embolus.     2.  Small LEFT pleural effusion.     3.  Bandlike areas of reticulation throughout both lungs likely  representing scarring/fibrosis related to prior Covid 19 infection.     4.  Findings in the upper abdomen will be described in a separate  report.  This report was finalized on 04/07/2022 16:31 by Dr. Gomez Cárdenas MD.     I have reviewed the patient's current medications.     Assessment/Plan     Active Hospital Problems    Diagnosis    •  **Intractable nausea and vomiting    • UTI (urinary tract infection), bacterial    • Sepsis secondary to UTI (HCC)    • Lactic acidosis    • Severe malnutrition (CMS/HCC)    • Hypophosphatemia    • Hypokalemia    • Malfunction of nephrostomy tube (HCC)    • Essential (primary) hypertension      Plan:  The patient was admitted on 4/3 by Dr. Ricks.  She presented with complaints of intractable nausea and vomiting.  Her medical problems started last August when she developed COVID-19 pneumonia and had numerous complications thereafter.  She developed an abdominal hematoma and had to go on dialysis secondary to extrinsic compression of her urinary system. She was transferred from here to [facility] and required exploratory laparotomy.  She ended up having bilateral percutaneous nephrostomy tubes and also ended up with a left ureteral stent.  She states that her main urologist is Dr. Rina Rey.  She states that she stopped producing urine from her percutaneous nephrostomy tube recently and that there are plans for it to be removed.  She states that she had gotten in touch with [facility] to see if someone here could do it so she would not have to travel.     When she left [facility], she spent considerable time at a skilled nursing facility in [place], [place].  She states that she has only been home a few weeks.  She was admitted to [facility] in [place] on 3/9 for electrolyte abnormalities.     Renal function stable yesterday with a creatinine 0.76.  Dr. Ricks began treating a urinary tract infection with ceftriaxone.  She had a multidrug-resistant Klebsiella in October 2021.  I transitioned her to [facility] on 4/3.  Lactate down to 1.7 from 2.8 after fluids.  Urine does appear infected with too numerous to count white blood cells, 4+ bacteria and large leukocyte esterase. The sample was also bloody.  Urine culture has grown 2 distinct pathogens.  There is a multidrug-resistant Proteus  "mirabilis strain and an ESBL Klebsiella strain.  Invanz should be treating both of these organisms concurrently.  Today is day 5 of Invanz.  We will treat 7-10 days. Lactic acidosis improved after fluids and antibiotics.  Procalcitonin elevated at 0.31.  Dr. Dyer is familiar with her previous problems as he saw her last year.  I consulted him to evaluate her.  He wants to treat her current infection and then have her follow back at [facility].  Repeat CT done by Dr. Damon for other reasons shows improvement of her urinary system.     Blood cultures contaminated with coagulase-negative Staphylococcus.    Replaced potassium aggressively. Multiple runs ordered and it is also in her fluids. \"I have always had potassium problems.\"  Potassium now 3.7 (5.).  Recent replacement of phosphorus and potassium with IV potassium phosphate.  Will replace phosphorus today with IV sodium phosphate.  Started oral sodium bicarbonate on 4/5.     Dr. Ricks asked for a GI opinion.  Dr. Babin saw and recommended an abdominal ultrasound. There is a sludge-filled gallbladder with no significant gallbladder wall thickening, pericholecystic fluid, or convincing evidence of acute cholecystitis.  Mild dilatation of the common hepatic duct with no visualized choledocholithiasis. HIDA scan shows an EF of 20%. Consulted general surgery to consider laparoscopic cholecystectomy given her sludge-filled gallbladder and equivocal ejection fraction.  Dr. Alicia Damon tentatively planned surgical intervention this afternoon.     She has been complaining of sinus congestion and her ears have been painful. Exam was normal. Continues on flonase, corticosporin eardrops. Consulted ENT on 4/6.  Discussed with Dr. Nichols.  He is going to take her for audiograms in the office today.       She is on Xarelto for history of pulmonary embolus. Dr. Damon would like for her to be off all anticoagulation for the time being.  She has not had Xarelto since " the evening of 4/4 and has not had therapeutic Lovenox since 1233 on 4/6.  Repeat CTA of the chest shows no pulmonary embolus.  Question if she needs to go back on full dose anticoagulation postsurgically.  Her venous thromboembolism was provoked last fall secondary to her COVID-19 infection.    Discharge Planning: I expect the patient to be discharged to ? in ? days.    Electronically signed by Fermin Mcclure DO, 04/08/22, 12:13 CDT.      Electronically signed by Fermin Mcclure DO at 04/08/22 6344

## 2022-04-11 NOTE — PLAN OF CARE
Goal Outcome Evaluation:  Plan of Care Reviewed With: patient, other (see comments) (Nursing)      Progress: no change  Outcome Evaluation: Ntn follow up. Pt continues with minimal oral intake over the past four days. Pt has been either NPO,Clear, or Full liquid diet since admision. Per intake report 25% of one meal documented on 4/8,today pt consume jello and few bites of yogurt with sips of gatorade per staff. Pt not receiving adequate oral nutrition at this time. If unable to establish oral intake may benefit from AMONS to meet estimated energy needs. Full liquid diet. Magic cup added TID. Cont to follow for plan of care.

## 2022-04-11 NOTE — CASE MANAGEMENT/SOCIAL WORK
Continued Stay Note  [facility]     Patient Name: Namita Zabala  MRN: [id number]  Today's Date: 4/11/2022    Admit Date: 4/2/2022     Discharge Plan     Row Name 04/11/22 1444       Plan    Plan Received call back from spouse. Does not want referral to [facility] at this time. Emmy/MARIO notified of cancel of referral. Requesting to speak with MD this evening after work. Nursing to notify [facility], when patient's spouse here. Spouse also concerned about decline in mental status. Requesting neural consult if possible. Orders recieved from Dr Villarreal for neural consult.               Discharge Codes    No documentation.                     Mi Hardy RN

## 2022-04-11 NOTE — PROGRESS NOTES
[facility] Medicine Services  INPATIENT PROGRESS NOTE    Length of Stay: 7  Date of Admission: 4/2/2022  Primary Care Physician: David Lara MD    Subjective   Chief Complaint: Failure to thrive/metabolic acidosis    HPI   No sign nausea vomiting.  Ongoing struggle with patient get her to eat.  Patient still remain very somnolent.  Discussed with nursing staff, this could be selective.  Patient could talk when she wants to.    Review of Systems   Unable to obtain due to somnolence.  Patient no acute distress.    All pertinent negatives and positives are as above. All other systems have been reviewed and are negative unless otherwise stated.     Objective    Temp:  [97.2 °F (36.2 °C)-98.2 °F (36.8 °C)] 98.2 °F (36.8 °C)  Heart Rate:  [] 88  Resp:  [16-18] 16  BP: (102-152)/() 102/68    Intake/Output Summary (Last 24 hours) at 4/11/2022 1104  Last data filed at 4/11/2022 0609  Gross per 24 hour   Intake 0 ml   Output 1200 ml   Net -1200 ml     Physical Exam  Vitals and nursing note reviewed.   Constitutional:       Appearance: She is well-developed.   HENT:      Head: Normocephalic.   Eyes:      Conjunctiva/sclera: Conjunctivae normal.      Pupils: Pupils are equal, round, and reactive to light.   Neck:      Vascular: No JVD.   Cardiovascular:      Rate and Rhythm: Regular rhythm.  Regular rate.     Heart sounds: Normal heart sounds. No murmur heard.    No friction rub. No gallop.   Pulmonary:      Effort: No respiratory distress.      Breath sounds: No wheezing or rales.      Comments: Diminished breath sound bilateral, clear .  Chest:      Chest wall: No tenderness.   Abdominal:      General: Bowel sounds are normal. There is no distension.      Palpations: Abdomen is soft.      Tenderness: There is no abdominal tenderness. There is no guarding or rebound.   Musculoskeletal:         General: No tenderness or deformity.      Cervical back: Neck supple.    Skin:     General: Skin is warm and dry.      Capillary Refill: Capillary refill takes 2 to 3 seconds.      Findings: No rash.   Neurological:      Cranial Nerves: No cranial nerve deficit.      Motor: Weakness present. No abnormal muscle tone.      Coordination: Coordination abnormal.      Gait: Gait abnormal.      Deep Tendon Reflexes: Reflexes normal.   Psychiatric:         Behavior: Behavior normal.         Thought Content: Thought content normal.   Results Review:  Lab Results (last 24 hours)     Procedure Component Value Units Date/Time    Comprehensive Metabolic Panel [[id number]]  (Abnormal) Collected: 04/11/22 0752    Specimen: Blood Updated: 04/11/22 0909     Glucose 102 mg/dL      BUN 7 mg/dL      Creatinine 0.70 mg/dL      Sodium 139 mmol/L      Potassium 3.3 mmol/L      Chloride 106 mmol/L      CO2 23.0 mmol/L      Calcium 8.3 mg/dL      Total Protein 4.8 g/dL      Albumin 2.30 g/dL      ALT (SGPT) 9 U/L      AST (SGOT) 20 U/L      Alkaline Phosphatase 87 U/L      Total Bilirubin 0.4 mg/dL      Globulin 2.5 gm/dL      A/G Ratio 0.9 g/dL      BUN/Creatinine Ratio 10.0     Anion Gap 10.0 mmol/L      eGFR 109.5 mL/min/1.73      Comment: National Kidney Foundation and American Society of Nephrology (ASN) Task Force recommended calculation based on the Chronic Kidney Disease Epidemiology Collaboration (CKD-EPI) equation refit without adjustment for race.       Narrative:      GFR Normal >60  Chronic Kidney Disease <60  Kidney Failure <15      CBC & Differential [[id number]]  (Abnormal) Collected: 04/11/22 0752    Specimen: Blood Updated: 04/11/22 0813    Narrative:      The following orders were created for panel order CBC & Differential.  Procedure                               Abnormality         Status                     ---------                               -----------         ------                     CBC Auto Differential[[id number]]        Abnormal            Final result                 Please  view results for these tests on the individual orders.    CBC Auto Differential [[id number]]  (Abnormal) Collected: 04/11/22 0752    Specimen: Blood Updated: 04/11/22 0813     WBC 8.88 10*3/mm3      RBC 3.45 10*6/mm3      Hemoglobin 9.7 g/dL      Hematocrit 28.5 %      MCV 82.6 fL      MCH 28.1 pg      MCHC 34.0 g/dL      RDW 15.4 %      RDW-SD 46.4 fl      MPV 9.1 fL      Platelets 256 10*3/mm3      Neutrophil % 66.1 %      Lymphocyte % 20.9 %      Monocyte % 7.4 %      Eosinophil % 4.2 %      Basophil % 0.5 %      Immature Grans % 0.9 %      Neutrophils, Absolute 5.87 10*3/mm3      Lymphocytes, Absolute 1.86 10*3/mm3      Monocytes, Absolute 0.66 10*3/mm3      Eosinophils, Absolute 0.37 10*3/mm3      Basophils, Absolute 0.04 10*3/mm3      Immature Grans, Absolute 0.08 10*3/mm3      nRBC 0.0 /100 WBC            Cultures:  No results found for: BLOODCX, URINECX, WOUNDCX, MRSACX, RESPCX, STOOLCX    Radiology Data:    Imaging Results (Last 24 Hours)     ** No results found for the last 24 hours. **          Allergies   Allergen Reactions   • Coconut Unknown - High Severity   • Nuts Unknown - High Severity   • Penicillins    • [place] Other (See Comments)     Causes migraines per pt reports       Scheduled meds:   bisacodyl, 10 mg, Rectal, Daily  enoxaparin, 1 mg/kg, Subcutaneous, Q12H  FLUoxetine, 20 mg, Oral, Nightly  fluticasone, 2 spray, Each Nare, BID  metoprolol succinate XL, 50 mg, Oral, Q24H  neomycin-polymyxin-hydrocortisone, 4 drop, Both Ears, Q6H  oxymetazoline, 2 spray, Nasal, BID  pantoprazole, 40 mg, Oral, BID AC  senna-docusate sodium, 2 tablet, Oral, Daily  sodium bicarbonate, 650 mg, Oral, BID  sodium chloride, 10 mL, Intravenous, Q12H  SUMAtriptan, 50 mg, Oral, Once        PRN meds:  hydrALAZINE  •  labetalol  •  Morphine  •  ondansetron **OR** ondansetron  •  sodium chloride  •  [COMPLETED] Insert peripheral IV **AND** sodium chloride  •  [COMPLETED] Insert peripheral IV **AND** sodium chloride  •   sodium chloride    Assessment/Plan       Intractable nausea and vomiting    Sepsis secondary to UTI (HCC)    Lactic acidosis    Hypokalemia    Essential (primary) hypertension    UTI (urinary tract infection), bacterial    Malfunction of nephrostomy tube (HCC)    Severe malnutrition (CMS/HCC)    Hypophosphatemia      Plan:  HPI.  Patient was admitted on 4/3 by Dr. Ricks.  Had COVID-19 here last year and ended up having an abdominal hematoma and had to go to [facility].  Was there for a long time and then an LTAC and then a skilled nursing facility.  Comes back with multidrug-resistant UTI on Invanz.   She presented with complaints of intractable nausea and vomiting.  Her medical problems started last August when she developed COVID-19 pneumonia and had numerous complications thereafter.  She developed an abdominal hematoma and had to go on dialysis secondary to extrinsic compression of her urinary system. She was transferred from here to [facility] and required exploratory laparotomy.  She ended up having bilateral percutaneous nephrostomy tubes and also ended up with a left ureteral stent.  She states that her main urologist is Dr. Rina Rey.  She states that she stopped producing urine from her percutaneous nephrostomy tube recently and that there are plans for it to be removed.  She states that she had gotten in touch with [facility] to see if someone here could do it so she would not have to travel.  When she left [facility], she spent considerable time at a skilled nursing facility in [place], [place].  She states that she has only been home a few weeks.  She was admitted to [facility] in [place] on 3/9 for electrolyte abnormalities.     UTI.  Renal function stable yesterday with a creatinine 0.76.  Dr. Ricks began treating a urinary tract infection with ceftriaxone.  She had a multidrug-resistant Klebsiella in October 2021.  I transitioned her to [facility] on 4/3.  Lactate down  to 1.7 from 2.8 after fluids.  Urine does appear infected with too numerous to count white blood cells, 4+ bacteria and large leukocyte esterase. The sample was also bloody.  Urine culture has grown 2 distinct pathogens.  There is a multidrug-resistant Proteus mirabilis strain and an ESBL Klebsiella strain.  Invanz should be treating both of these organisms concurrently.  Today is day 5 of Invanz.  We will treat 7-10 days.  Lactic acidosis improved after fluids and antibiotics.  Procalcitonin elevated at 0.31.  Dr. Dyer is familiar with her previous problems as he saw her last year.  I consulted him to evaluate her.  He wants to treat her current infection and then have her follow back at [facility].  On Invanz.  Recommendation from urology-  Urology saw her and wants her to go back to [facility] to have her percutaneous nephrostomy pulled.     Hypokalemia .   P.o. potassium.     Sludge in gallbladder/dysfunctional gallbladder?.  General surgery consult.  GI consult. No surgery per general surgery.  CT scan abdomen pelvic-persistent mild but improved urothelial thickening involving the left renal pelvis and proximal left ureter- related to resolving UTI,  internal/external left-sided nephroureterostomy tube appears in satisfactory position, mild left-sided hydronephrosis which is decreased, Small amount gas is noted in the bladder,  No significant bladder wall thickening is identified, Small left pleural effusion- Increased hazy airspace opacities in the lung bases favored to represent diffuse atelectasis,  Based on the volume of gas within the GI tract- mild ileus may be present,  No evidence of bowel obstruction, Small amount fluid within the upper vagina where previously there was a small amount of gas, Hepatic steatosis.  HIDA scan-20% biliary ejection fraction.  Ultrasound abdomen pelvic-Sludge-filled gallbladder with no significant gallbladder wall thickening, pericholecystic fluid or convincing evidence  of acute cholecystitis, Mild dilation of the common hepatic duct with no visualized choledocholithiasis, Hepatic steatosis.     Nausea/vomiting.  Nausea vomiting resolved.  DC Reglan.  Protonix.  Bicarb. DC Inapsine as needed.  DC Compazine as needed. DC Phenergan as needed.     Sinus congestion/sinus pain.  Flonase.  Corticosporin eardrop.  ENT consult.  Afrj.  Dr. Nichols saw her and has wanted to do an audiogram, but she has declined to go over to his office the last 2 days.   CTA chest-No evidence of pulmonary embolus, Small LEFT pleural effusion, Bandlike areas of reticulation throughout both lungs likely representing scarring/fibrosis related to prior Covid 19 infection.      Hypertension/tachycardia.  Tachycardia resolved.   Cut back Toprol.     History of pulmonary embolus.  Hold Xarelto possible surgery.    DC Lovenox and put patient back on Xarelto 2022.    Anxiety/depression/insomnia . Decrease Prozac nightly due to somnolence.   DC Ambien as needed due to somnolence.  DC trazodone due to some.     Headaches.    DC Fioricet as needed due to somnolence.        Pain.  DC Robaxin as needed due to somnolence. Morphine as needed.     Allergic rhinitis.  Flonase.     Nutrition.  Full liquid diet.     Deconditioning.  PT and OT consult.  Last time patient walk was in August of last year after Covid. Patient is mostly bedbound at the nursing home.     Blood culture-contamination with coagulase negative Staphylococcus.  Urine culture shows Proteus and Klebsiella.  Covid-19-negative.  Flu screen A and B-negative.     Discharge Plannin to 6 days.   Plan for LTAC placement.    Electronically signed by Amado Villarreal MD, 22, 11:04 AM CDT.

## 2022-04-11 NOTE — CONSULTS
Palliative Care Initial Consult   Attending Physician: Amado Villarreal MD  Referring Provider:  Amado Villarreal MD    Reason for Referral: assistance with clarification of goals of care  Family/Support: spouse and parent    Code Status and Medical Interventions:   Ordered at: 04/03/22 0411     Level Of Support Discussed With:    Patient     Code Status (Patient has no pulse and is not breathing):    CPR (Attempt to Resuscitate)     Medical Interventions (Patient has pulse or is breathing):    Full Support     Subjective     HPI: 46 y.o. female with past medical history of angina at rest, migraines, mitral valve prolapse, renal disorder and syncope. Per chart review she was admitted at [facility] from 8/27/2021-9/24/2021 with COVID-19. According to chart review, during this hospitalization she was found to have a pulmonary embolisms and was started on anticoagulants and developed a large pelvic hematoma which obstructed ureters and developed acute renal failure. She was started on temporary dialysis and discharged to LTAC. Appears she was admitted to LTAC until 10/12/2021 when she was discharged home with her spouse and home health. Chart review reveals she presented to the ED on 10/15/2021 and reported she had not urinated since discharge from LTAC. Workup in ED revealed complex issues including concerns for infected hematoma and bladder obstruction, she was transferred to [facility] for further workup and treatment. Care everywhere reveals she was admitted at [facility] from 10/16/2021-12/23/2021 and underwent bilateral percutaneous nephrostomy tube placement but also experienced necrotizing soft tissue infection and required multiple surgeries for abdominal wall debridements. She was discharged to nursing facility in [place], [place]. Most recent hospitalization was at [facility] from 1/18/2022-1/25/2022 due to displaced nephrostomy tube. Underwent new right PCNU placement and was discharged  back to nursing facility. Patient presented to [facility] on 4/2/2022 related to weakness and vomiting. Workup in ED revealed hypokalemia, creatinine 1.11, calcium 10.6, alkaline phosphatase 149, lactate 2.8 and procalcitonin 0.31. Chest x-ray revealed chronic right hemidiaphragm elevation with associated right basilar atelectasis however no acute findings visualized. CT of abdomen and pelvis completed which revealed hepatic steatosis, mild enhancement of the left renal pelvis and bladder wall with presence of left-sided external nephrostomy stent and left double pigtail ureteral catheter present. Urinalysis revealed 4+ bacteria, 3+ blood and 3+ protein however negative for nitrite. Urine culture sent and resulted positive for proteus mirabilis and klebsiella oxytoca ESBL. Blood cultures also collected while in ED which initially resulted positive however thought to be contaminant with coagulase-negative Staphylococcus. She was admitted to the medical floor for treatment and further workup. Gastroenterology consulted and have recommended consideration of abdominal ultrasound and possible EGD. Urology consult also placed for further recommendations regarding percutaneous and ureteral stent. Urology has evaluated and recommended contacting [facility] due to patient's complex clinical course to determine if she would be appropriate for transfer to their facility versus follow up outpatient while continuing empiric antibiotics covering urinary species. US of abdomen completed on 4/5/2022 and revealed sludge-filled gallbladder and mild dilation of the hepatic duct with hepatic steatosis. HIDA scan completed on 4/6/2022 and revealed 20% biliary ejection fraction. General surgery was consulted and ordered multiple CT scans on 4/7/2022 to further evaluate prior to possible surgical intervention. CTA of chest revealed no evidence of pulmonary embolus however a small left pleural effusion visualized as  well as bandlike areas of reticulation throughout bilateral lungs likely representing scarring/fibrosis related to prior COVID-19 infection. CT of abdomen and pelvis with contrast revealed persistent mild urothelial thickening involving the left renal pelvis and proximal left ureter which may be related to resolving UTI. Presence of gas within the GI tract possibly representing a mild ileus however no bowl obstruction visualized. She also reported bilateral ear pain throughout admission and reported her hearing had been affected on 4/6/2022, ENT consulted and had recommended audiometric evaluation. Apparently she refused to go to audiogram on 4/7/2022 and 4/8/2022. Most recent labs completed this morning reveal renal function has normalized, although she is hypokalemic with potassium 3.3, calcium low at 8.3, total protein 4.8 and albumin 2.30. She remains anemic with RBC 3.45, hemoglobin 9.7 and hematocrit 28.5. Chart reviewed to assess weight loss and the following have been documented as her weight: 198 lbs on 8/27/2021, 190 lbs on 10/16/2021, 176 lbs on 12/10/2021, 167 lbs on 1/18/2022. On date of admission weight was documented as 149 lbs however weight from today's date is documented as 157 lbs. If documentation is correct she has lost approximately 50 lbs in the last 6 months.  She is somnolent and unable to provide any information at time of exam so unable to confirm this, she does not appear in any distress. Nursing at bedside are trying to feed her however she is not cooperating. Nursing reports she is alert at times however when staff enter and start speaking to her or performing intervention she pretends she is asleep. No visitors at bedside. Apparently has been bed bound since August and has been unable to participate with therapy since 4/4/2022 so they have signed off.      Advance Care Planning     Advance Care Planning Discussion: Attempted to contact patient's spouse, Grant Zabala, at [phone].  Unable to reach him and voicemail has not been set up. Will ask nursing to have him call Palliative Care if they are able to reach or speak to him. Will attempt to contact again this afternoon.     The patient receives support from her spouse and parent.  POA/Healthcare Surrogate - Next of kin is her spouse, Grant.     Advance Directive Status: Patient does not have advance directive     Review of Systems   Unable to perform ROS: mental status change       Pain Assessment  Nonverbal Indicators of Pain: grimace  PAINAD Breathin-->normal  PAINAD Negative Vocalization: 1-->occasional moan/groan, low speech, negative/disapproving quality  PAINAD Facial Expression: 2-->facial grimacing  PAINAD Body Language: 2-->rigid, fists clenched, knees up, pushing/pulling away, strikes out  PAINAD Consolability: 1-->distracted or reassured by voice/touch  PAINAD Score: 6  Pain Location: abdomen      Past Medical History:   Diagnosis Date   • Angina at rest (HCC)    • Migraine     Sees Pain Management for injections.    • MVP (mitral valve prolapse)    • Renal disorder    • Syncope       Past Surgical History:   Procedure Laterality Date   • BREAST BIOPSY Right     benign   • INSERTION HEMODIALYSIS CATHETER Left 2021    Procedure: HEMODIALYSIS CATHETER INSERTION;  Surgeon: Anders Kaur MD;  Location: [place] [address];  Service: Vascular;  Laterality: Left;   • TONSILLECTOMY        Social History     Socioeconomic History   • Marital status:    Tobacco Use   • Smoking status: Never Smoker   • Smokeless tobacco: Never Used   Substance and Sexual Activity   • Alcohol use: No   • Drug use: No         Allergies   Allergen Reactions   • Coconut Unknown - High Severity   • Nuts Unknown - High Severity   • Penicillins    • [place] Other (See Comments)     Causes migraines per pt reports       Objective   Diagnostics: Reviewed    Intake/Output Summary (Last 24 hours) at 2022 0742  Last data filed at  4/11/2022 0609  Gross per 24 hour   Intake 0 ml   Output 1300 ml   Net -1300 ml       Current medication reviewed for route, type, dose and frequency and are current per MAR at time of dictation.  Current Facility-Administered Medications   Medication Dose Route Frequency Provider Last Rate Last Admin   • bisacodyl (DULCOLAX) suppository 10 mg  10 mg Rectal Daily Amado Villarreal MD   10 mg at 04/10/22 1251   • enoxaparin (LOVENOX) syringe 70 mg  1 mg/kg Subcutaneous Q12H Amado Villarreal MD   70 mg at 04/10/22 2125   • FLUoxetine (PROzac) capsule 20 mg  20 mg Oral Nightly Amado Villarreal MD       • fluticasone (FLONASE) 50 MCG/ACT nasal spray 2 spray  2 spray Each Nare BID Frank Ricks MD   2 spray at 04/10/22 2125   • hydrALAZINE (APRESOLINE) injection 10 mg  10 mg Intravenous Q4H PRN Amdao Villarreal MD       • labetalol (NORMODYNE,TRANDATE) injection 10 mg  10 mg Intravenous Q4H PRN Amado Villarreal MD   10 mg at 04/10/22 1518   • metoprolol succinate XL (TOPROL-XL) 24 hr tablet 100 mg  100 mg Oral Q24H Amado Villarreal MD   50 mg at 04/10/22 1251   • Morphine sulfate (PF) injection 1 mg  1 mg Intravenous Q4H PRN Fermin Mcclure DO   1 mg at 04/07/22 0904   • neomycin-polymyxin-hydrocortisone (CORTISPORIN) otic suspension 4 drop  4 drop Both Ears Q6H Fermin Mcclure DO   4 drop at 04/11/22 0631   • ondansetron (ZOFRAN) tablet 4 mg  4 mg Oral Q6H PRN Frank Ricks MD        Or   • ondansetron (ZOFRAN) injection 4 mg  4 mg Intravenous Q6H PRN Frank Ricks MD   4 mg at 04/07/22 0904   • oxymetazoline (AFRIN) nasal spray 2 spray  2 spray Nasal BID Frank iRcks MD   2 spray at 04/10/22 2126   • pantoprazole (PROTONIX) EC tablet 40 mg  40 mg Oral BID AC Amado Villarreal MD   40 mg at 04/10/22 1630   • sennosides-docusate (PERICOLACE) 8.6-50 MG per tablet 2 tablet  2 tablet Oral Daily Amado Villarreal MD       • sodium bicarbonate 8.4 % 100 mEq in dextrose 5 % and sodium chloride 0.45 % 1,000 mL  "infusion (greater than 75 mEq)  100 mEq Intravenous Continuous Amado Villarreal MD 75 mL/hr at 04/11/22 0353 100 mEq at 04/11/22 0353   • sodium bicarbonate tablet 650 mg  650 mg Oral BID Ren Fermin NATHALIE,    650 mg at 04/10/22 2124   • sodium chloride 0.9 % flush 10 mL  10 mL Intravenous PRN Frank Ricks MD       • sodium chloride 0.9 % flush 10 mL  10 mL Intravenous PRN Frank Ricks MD       • sodium chloride 0.9 % flush 10 mL  10 mL Intravenous PRN Frank Ricks MD       • sodium chloride 0.9 % flush 10 mL  10 mL Intravenous Q12H Frank Ricks MD   10 mL at 04/10/22 0910   • sodium chloride 0.9 % flush 10 mL  10 mL Intravenous PRN Frank Ricks MD   10 mL at 04/10/22 1518   • SUMAtriptan (IMITREX) tablet 50 mg  50 mg Oral Once Frank Ricks MD         sodium bicarbonate drip (greater than 75 mEq/bag), 100 mEq, Last Rate: 100 mEq (04/11/22 0353)      hydrALAZINE  •  labetalol  •  Morphine  •  ondansetron **OR** ondansetron  •  sodium chloride  •  [COMPLETED] Insert peripheral IV **AND** sodium chloride  •  [COMPLETED] Insert peripheral IV **AND** sodium chloride  •  sodium chloride    Assessment   /77 (BP Location: Left arm, Patient Position: Lying)   Pulse 85   Temp 97.8 °F (36.6 °C) (Axillary)   Resp 18   Ht 170.2 cm (67\")   Wt 71.6 kg (157 lb 14.4 oz)   SpO2 98%   BMI 24.73 kg/m²     Physical Exam  Vitals and nursing note reviewed.   Constitutional:       General: She is not in acute distress.     Appearance: She is ill-appearing.   HENT:      Head: Normocephalic and atraumatic.      Right Ear: Decreased hearing (reported previously and by nursing) noted.      Left Ear: Decreased hearing (reported previously and by nursing) noted.      Mouth/Throat:      Mouth: Mucous membranes are dry.   Eyes:      General: Lids are normal.   Neck:      Vascular: No JVD.   Cardiovascular:      Rate and Rhythm: Normal rate and regular rhythm.      Heart sounds: Normal heart sounds. "   Pulmonary:      Effort: Pulmonary effort is normal.      Breath sounds: Decreased breath sounds present.   Abdominal:      General: There is no distension.      Comments: Wound to midline abdomen, mepilex dressing in place, CDI.    Genitourinary:     Comments: Nephrostomy to left side  Musculoskeletal:      Cervical back: Neck supple.      Comments: Prevalon boots present to BLE   Skin:     General: Skin is warm and dry.      Findings: Signs of injury and rash present.   Neurological:      Mental Status: She is lethargic and disoriented.      Motor: Weakness present.   Psychiatric:         Behavior: Behavior is uncooperative.         Cognition and Memory: Cognition is impaired.      Comments: Only responds to painful stimuli      Patient status: Disease state: Deteriorating despite treatments.  Functional status: Palliative Performance Scale Score: Performance 10% based on the following measures: Ambulation: Totally bed bound, Activity and Evidence of Disease: Unable to do any work, extensive evidence of disease, Self-Care: Total care required,  Intake: Mouth care only, LOC: Drowsy or comatose   Nutritional status: Albumin 2.30. Body mass index is 24.73 kg/m².     Impression/Problem List:  1.   Poor performance status   2.   Altered mental status  3.   Severe malnutrition  4.   Urinary tract infection  5.   Sepsis secondary to UTI  6.   Hepatic steatosis  7.   Anemia   8.   Impaired functioning of gallbladder (sludge-filled and 20% EF per imaging)  9.   Intractable nausea and vomiting  10. Hypertension  11. Lactic acidosis  12. Hypokalemia  13. Pleural effusion, left   14. Hypocalcemia   15. History of COVID-19 (August 2021)    Plan/Recommendations     1. Goals of care include CODE STATUS CPR with full support interventions.    2. Palliative care encounter  - Prognosis is poor long-term secondary to poor performance status, altered mental status, severe malnutrition, urinary tract infection, sepsis, hepatic  "steatosis, anemia, impaired gallbladder functioning, recent complex medical history including renal failure requiring temporary dialysis, nephrostomy placement, bladder rupture and many other comorbidities listed above.   - Patient is somnolent and unable to provide any history and/or demonstrate ability to make complex medical decisions.   - Attempted to contact her spouse at number listed in chart however unable to reach and voicemail has not been set up. Have asked nursing to have him call Palliative Care if they are able to speak to him.   - If no contact this afternoon, will attempt to contact him again to discuss goals of care.       ADDENDUM:  - Discussed condition with patient spouse, Grant, via telephone.  He expressed his frustrations with his patient's decline.  Reports he was just contacted by someone in regards to discharge to LTAC however he does not wish for her to go there as they previously had bad experience.  - Questioned what it would take to get his spouse to ENT for her hearing to be assessed.  Explained that they are currently unable to safely place her in a wheelchair and pressure to the ENT office and are focusing on more urgent needs at this time. He states he will be happy to come in on his day off Thursday or Friday to take her to office.   - Explained a MRI of brain has been ordered and this may be able to determine any abnormalities that could cause her hearing loss and also reasons for AMS.   - Expressed concerns regarding mental status and stated, \"I want it all checked out. I don't understand why it has not been done.  I am lost because I cannot get her to open her eyes.\"  Explained an MRI has been ordered to assess for abnormalities that could be causing AMS.   - Reflected on her recent hospitalizations and reports she was discharged from nursing facility and home for around 3 weeks because she was no longer able to participate in therapy due to nausea and vomiting. States nursing " "facility reported there was nothing else they can do for her.  - Explored goals of care regarding interventions such as artificial nutrition if she is unable to receive enough nutrition PO. He stated, \"I want her to have all the care she needs.\"  - Discussed other interventions and he expressed he wishes for her to remain CPR and full support interventions.  He reports he is hopeful that answers will be found in regards to her decline.  - Appreciative of update and declines further answers. Will plan to follow up with patient and spouse tomorrow.    Thank you for this consult and allowing us to participate in patient's plan of care. Palliative Care Team will continue to follow patient.     Time spent: 50 minutes spent reviewing medical and medication records, assessing and examining patient, discussing with family, answering questions, providing some guidance about a plan and documentation of care, and coordinating care with other healthcare members, with > 50% time spent face to face.       Nneka Barton, APRN  4/11/2022                "

## 2022-04-11 NOTE — CASE MANAGEMENT/SOCIAL WORK
Discharge Planning Assessment  [facility]     Patient Name: Namita Cooper  MRN: [id number]  Today's Date: 4/11/2022    Admit Date: 4/2/2022     Discharge Needs Assessment     Row Name 04/11/22 1215       Living Environment    People in Home significant other;parent(s)    Name(s) of People in Home BRANDON COOPER, SPOUSE;  FILEMON MALLOY, MOTHER; BHARGAVI COOPER, MOTHER IN LAW    Current Living Arrangements home    Primary Care Provided by spouse/significant other;parent(s);other (see comments)    Provides Primary Care For no one, unable/limited ability to care for self    Caregiving Concerns DEPENDENT FOR CARE    Family Caregiver if Needed spouse;parent(s)    Family Caregiver Names BRANDON, SPOUSE;  FILEMON, MOTHER, BHARGAVI MOTHERIN LAW    Quality of Family Relationships helpful;involved;supportive    Able to Return to Prior Arrangements other (see comments)  MD RECOMMENDING [facility]. FAMILY NOT SURE.       Resource/Environmental Concerns    Transportation Concerns no car  EMS       Transition Planning    Patient/Family Anticipates Transition to home with family;inpatient rehabilitation facility    Patient/Family Anticipated Services at Transition home health care;rehabilitation services    Transportation Anticipated family or friend will provide       Discharge Needs Assessment    Readmission Within the Last 30 Days no previous admission in last 30 days    Current Outpatient/Agency/Support Group homecare agency  NOT SURE, MAYBE [facility]    Equipment Currently Used at Home commode;lift device;wheelchair    Concerns to be Addressed no discharge needs identified    Discharge Facility/Level of Care Needs rehabilitation facility;home with home health    Provided Post Acute Provider List? --  CURRENT WITH HH. PENDING DETERMINATION WHICH ONE    Discharge Coordination/Progress Plan for home with HH or referral to inpatient rehab facility, (SNF or SWB). Pending decision whether or not they want referral to [facility]. Spouse to  call back. He is at work, with full voicemail. Patient's mother, Marquita , gave patient information. Patient unable to speak for herself today. Patient has PCP and rx coverage. Will wait to hear back from spouse.               Discharge Plan     Row Name 04/11/22 1113       Plan    Plan [facility] REQUESTING [facility] REFERRAL FOR CONTINUED NEED POST ACUTE CARE. ANTIBX/THERAPY. MAU WITH [facility] NOTIFIED. PENDING BED OFFER.              Continued Care and Services - Admitted Since 4/2/2022    Coordination has not been started for this encounter.          Demographic Summary    No documentation.                Functional Status    No documentation.                Psychosocial    No documentation.                Abuse/Neglect    No documentation.                Legal    No documentation.                Substance Abuse    No documentation.                Patient Forms    No documentation.                   Mi Hardy RN

## 2022-04-11 NOTE — CONSULTS
Neurology Consult Note    Referring Provider: Amado Villarreal MD  Reason for Consultation: change in mental status.  difficult hearing says family      History of present illness:    This is a 46 y.o. female patient with PMH of migraine, neurocardiogenic syncope, angina at rest, renal disorder. History is obtained by medical record and nursing staff. There is no family present. Patient contracted Covid 8/2021 with a very prolonged and  complicated course with pulmonary embolism started on anticoagulants and developed large pelvic hematoma that obstructed ureters and then developed acute renal failure and temporarily required hemodialysis. Patient discharged to LTAC until 10/2021 and then discharged home with home health. Patient then returned to local ED a few days after as she had not urinated. She had several complex issues including infected hematoma and bladder obstruction where she stayed from 10/16-12/23/2021. There she had undergone bilateral percutaneous nephrostomy tube place and multiple surgeries for necrotizing soft tissue infection and abdominal wall debridements. She  was discharged to SNF in [place], [place]. Patient last admission to [facility] 1/18-1/25/2022 for displaced nephrostomy tube and was discharged back to SNF. According to notes, patient was then discharged to home about 3-4 weeks ago. Patient had not ambulated since 9/2021. Patient presented to [facility] ED 4/2/2022 for n/v whenever she ate. She was found to be hypokalemic, creatinine 1.11, alkaline phos 149, lactate 2.8, porcalictonin 0.31. CT abd/pelvis showed hepatic steatosis, mild enhancement of left renal pelvis and bladder wall with presence of left external nephrostomy stent and left double pigtail ureteral catheter present which nursing reports is not draining urine. Urologist has seen and recommend patient return to [facility] when able for this to be removed. UA on admission showed UTI and positive for proteus mirabilis and klebsiella.  Gastroenterology was consulted who recommended consideration of abd US and possible EGD. US abd showed sludge filled gallbladder and mild dilatation of hepatic duct with hepatic steatosis. HIDA showed 20% EF. General surgery consulted recommended further CT studies to determine surgical candidacy. CTA chest showed no evidence of PE.Since patient complex medical course she has had 50 pound weight loss.   ENT was consulted for c/o hearing loss. Audiology exam was recommended and patient refused to go for testing.   Nursing reports that upon admission, patient was conversant and was able to feed herself. Over the course of the last few days, patient has been more somnolent. Nursing gives the impression that patient is able to do more than she is demonstrating. She has asked nursing what tasks they are performing such as when they are flushing her IV sites.   Therapy had been treating patient but then signed off secondary to patient lack of participation.   Attending had decreased Prozac from 40 mg to 20 mg today. Also discontinued all sedating medication such as trazodone, ambien, Fioricet and robaxin but patient has not been receiving these frequently and some had not received at all since admission.   Nursing reports patient will not drink from straw but with stimulation will open her mouth and when place medications in mouth patient will swallow the meds. Nursing reports has not observed n/v since admission. Nursing also reports patient has bitten staff when assisting with feeding.            Past Medical History:   Diagnosis Date   • Angina at rest (HCC)    • Migraine     Sees Pain Management for injections.    • MVP (mitral valve prolapse)    • Renal disorder    • Syncope        Allergies   Allergen Reactions   • Coconut Unknown - High Severity   • Nuts Unknown - High Severity   • Penicillins    • [place] Other (See Comments)     Causes migraines per pt reports     No current facility-administered medications on  file prior to encounter.     Current Outpatient Medications on File Prior to Encounter   Medication Sig   • butalbital-acetaminophen-caffeine (FIORICET, ESGIC) -40 MG per tablet Take 2 tablets by mouth Every 4 (Four) Hours As Needed for Headache or Migraine.   • eszopiclone (LUNESTA) 3 MG tablet Take 3 mg by mouth Every Night. Take immediately before bedtime   • FLUoxetine (PROzac) 40 MG capsule Take 40 mg by mouth Daily.   • methocarbamol (ROBAXIN) 500 MG tablet Take 500 mg by mouth 3 (Three) Times a Day As Needed for Muscle Spasms.   • metoclopramide (REGLAN) 5 MG/5ML solution Take 5 mg by mouth 3 (Three) Times a Day Before Meals.   • ondansetron ODT (ZOFRAN-ODT) 4 MG disintegrating tablet Place 4 mg on the tongue 4 (Four) Times a Day As Needed for Nausea or Vomiting.   • oxymetazoline (AFRIN) 0.05 % nasal spray 2 sprays into the nostril(s) as directed by provider 2 (Two) Times a Day.   • prochlorperazine (COMPAZINE) 10 MG tablet Take 10 mg by mouth Every 8 (Eight) Hours As Needed for Nausea or Vomiting.   • promethazine (PHENERGAN) 25 MG tablet Take 25-50 mg by mouth Every 6 (Six) Hours As Needed for Nausea or Vomiting.   • rivaroxaban (XARELTO) 20 MG tablet Take 20 mg by mouth Every Night.   • rizatriptan (MAXALT) 10 MG tablet Take 10 mg by mouth 1 (One) Time As Needed for Migraine. May repeat in 2 hours if needed   • traZODone (DESYREL) 100 MG tablet Take 100 mg by mouth Every Night.   • Calcium Carbonate-Simethicone 750-80 MG chewable tablet Chew 1 tablet Daily.   • pantoprazole (PROTONIX) 20 MG EC tablet Take 20 mg by mouth Daily.       Current Facility-Administered Medications:   •  bisacodyl (DULCOLAX) suppository 10 mg, 10 mg, Rectal, Daily, Amado Villarreal MD, 10 mg at 04/11/22 0851  •  FLUoxetine (PROzac) capsule 20 mg, 20 mg, Oral, Nightly, Amado Villarreal MD  •  fluticasone (FLONASE) 50 MCG/ACT nasal spray 2 spray, 2 spray, Each Nare, TERSEO, Frank Ricks MD, 2 spray at 04/11/22 0854  •   hydrALAZINE (APRESOLINE) injection 10 mg, 10 mg, Intravenous, Q4H PRN, Amado Villarreal MD  •  labetalol (NORMODYNE,TRANDATE) injection 10 mg, 10 mg, Intravenous, Q4H PRN, Amado Villarreal MD, 10 mg at 04/10/22 1518  •  lactated ringers infusion, 75 mL/hr, Intravenous, Continuous, Amado Villarreal MD, Last Rate: 75 mL/hr at 04/11/22 1148, 75 mL/hr at 04/11/22 1148  •  metoprolol succinate XL (TOPROL-XL) 24 hr tablet 50 mg, 50 mg, Oral, Q24H, Amado Villarreal MD, 50 mg at 04/11/22 0851  •  Morphine sulfate (PF) injection 1 mg, 1 mg, Intravenous, Q4H PRN, Fermin Mcclure DO, 1 mg at 04/07/22 0904  •  neomycin-polymyxin-hydrocortisone (CORTISPORIN) otic suspension 4 drop, 4 drop, Both Ears, Q6H, Fermin Mcclure DO, 4 drop at 04/11/22 1148  •  ondansetron (ZOFRAN) tablet 4 mg, 4 mg, Oral, Q6H PRN **OR** ondansetron (ZOFRAN) injection 4 mg, 4 mg, Intravenous, Q6H PRN, Frank Ricks MD, 4 mg at 04/07/22 0904  •  oxymetazoline (AFRIN) nasal spray 2 spray, 2 spray, Nasal, BID, Frank Ricks MD, 2 spray at 04/11/22 0854  •  pantoprazole (PROTONIX) EC tablet 40 mg, 40 mg, Oral, BID AC, Amado Villarreal MD, 40 mg at 04/11/22 0851  •  potassium chloride (MICRO-K) CR capsule 40 mEq, 40 mEq, Oral, BID, Amado Villarreal MD, 40 mEq at 04/11/22 1148  •  rivaroxaban (XARELTO) tablet 20 mg, 20 mg, Oral, Nightly, Amado Villarreal MD  •  sennosides-docusate (PERICOLACE) 8.6-50 MG per tablet 2 tablet, 2 tablet, Oral, Daily, Amado Villarreal MD, 2 tablet at 04/11/22 0851  •  sodium bicarbonate tablet 650 mg, 650 mg, Oral, BID, Fermin Mcclure DO, 650 mg at 04/11/22 0851  •  sodium chloride 0.9 % flush 10 mL, 10 mL, Intravenous, PRN, Frank Ricks MD  •  [COMPLETED] Insert peripheral IV, , , Once **AND** sodium chloride 0.9 % flush 10 mL, 10 mL, Intravenous, PRN, Frank Ricks MD  •  [COMPLETED] Insert peripheral IV, , , Once **AND** sodium chloride 0.9 % flush 10 mL, 10 mL, Intravenous, PRN, Frank Ricks MD  •  sodium  chloride 0.9 % flush 10 mL, 10 mL, Intravenous, Q12H, Frank Ricks MD, 10 mL at 04/11/22 0853  •  sodium chloride 0.9 % flush 10 mL, 10 mL, Intravenous, PRN, Frank Ricks MD, 10 mL at 04/10/22 1518  •  SUMAtriptan (IMITREX) tablet 50 mg, 50 mg, Oral, Once, Frank Ricks MD  Social History     Socioeconomic History   • Marital status:    Tobacco Use   • Smoking status: Never Smoker   • Smokeless tobacco: Never Used   Substance and Sexual Activity   • Alcohol use: No   • Drug use: No     Family History   Problem Relation Age of Onset   • Colon cancer Maternal Aunt 52   • Fibromyalgia Mother    • Heart disease Mother    • Hypertension Mother    • Hodgkin's lymphoma Paternal Grandmother    • Ovarian cancer Neg Hx    • Breast cancer Neg Hx        Review of Systems  A 14 point review of systems not able to be obtained as patient is nonverbal.     Vital Signs   Temp:  [97.2 °F (36.2 °C)-98.2 °F (36.8 °C)] 98.2 °F (36.8 °C)  Heart Rate:  [] 88  Resp:  [16-18] 16  BP: (102-152)/() 102/68    General Exam:  Patient looks chronically ill  Head:  Normal cephalic, atraumatic  HEENT:  Neck supple  Fundoscopic Exam:  No signs of disc edema  CVS:  Regular rate and rhythm.  No murmurs  Carotid Examination:  No bruits  Lungs:  Clear to auscultation  Abdomen:  Non-tender, Non-distended  Extremities:  No signs of peripheral edema  Skin:  No rashes    Neurologic Exam:    Mental Status:    -drowsy  -nonverbal.  Patient cries out with repositioning  Not following commands.    Patient would not open eyes on command and when examiner would manually lift eyelids there was resistance.  CN II:  Visual fields full. Pupils equally reactive to light  CN III, IV, VI:  Extraocular Muscles full with no signs of nystagmus  CN V:  Facial sensory is symmetric with no asymmetries.  CN VII:  Facial motor symmetric  CN VIII:  Gross hearing intact bilaterally  CN IX:  Palate elevates symmetrically  CN X:  Palate elevates  symmetrically  CN XI:  Shoulder shrug symmetric  CN XII:  Tongue is midline on protrusion    Motor: (strength out of 5:  1= minimal movement, 2 = movement in plane of gravity, 3 = movement against gravity, 4 = movement against some resistance, 5 = full strength)    Patient would not follow commands.  There were some purposeful movements of bilateral upper extremites when patient was holding the bed rail on left and patient did lift right arm to scratch her chest.  Some spontaneous movements of toes bilaterally.      DTR:  -Right   Bicep: 2+ Tricep: 2+ Brachioradialis: 2+   Patella: 2+ Ankle: 2+ Neg Babinski  -Left   Bicep: 2+ Tricep: 2+ Brachioradialis: 2+   Patella: 2+ Ankle: 2+ Neg Babinski    Sensory:  -Intact to light touch, pinprick, temperature, pain, and proprioception    Coordination:  -no increase tone or cog wheeling.    Gait  -not tested. Patient had not ambulated since 9/2021.       Results Review:  Lab Results (last 24 hours)     Procedure Component Value Units Date/Time    Comprehensive Metabolic Panel [[id number]]  (Abnormal) Collected: 04/11/22 0752    Specimen: Blood Updated: 04/11/22 0909     Glucose 102 mg/dL      BUN 7 mg/dL      Creatinine 0.70 mg/dL      Sodium 139 mmol/L      Potassium 3.3 mmol/L      Chloride 106 mmol/L      CO2 23.0 mmol/L      Calcium 8.3 mg/dL      Total Protein 4.8 g/dL      Albumin 2.30 g/dL      ALT (SGPT) 9 U/L      AST (SGOT) 20 U/L      Alkaline Phosphatase 87 U/L      Total Bilirubin 0.4 mg/dL      Globulin 2.5 gm/dL      A/G Ratio 0.9 g/dL      BUN/Creatinine Ratio 10.0     Anion Gap 10.0 mmol/L      eGFR 109.5 mL/min/1.73      Comment: National Kidney Foundation and American Society of Nephrology (ASN) Task Force recommended calculation based on the Chronic Kidney Disease Epidemiology Collaboration (CKD-EPI) equation refit without adjustment for race.       Narrative:      GFR Normal >60  Chronic Kidney Disease <60  Kidney Failure <15      CBC & Differential  [[id number]]  (Abnormal) Collected: 04/11/22 0752    Specimen: Blood Updated: 04/11/22 0813    Narrative:      The following orders were created for panel order CBC & Differential.  Procedure                               Abnormality         Status                     ---------                               -----------         ------                     CBC Auto Differential[[id number]]        Abnormal            Final result                 Please view results for these tests on the individual orders.    CBC Auto Differential [[id number]]  (Abnormal) Collected: 04/11/22 0752    Specimen: Blood Updated: 04/11/22 0813     WBC 8.88 10*3/mm3      RBC 3.45 10*6/mm3      Hemoglobin 9.7 g/dL      Hematocrit 28.5 %      MCV 82.6 fL      MCH 28.1 pg      MCHC 34.0 g/dL      RDW 15.4 %      RDW-SD 46.4 fl      MPV 9.1 fL      Platelets 256 10*3/mm3      Neutrophil % 66.1 %      Lymphocyte % 20.9 %      Monocyte % 7.4 %      Eosinophil % 4.2 %      Basophil % 0.5 %      Immature Grans % 0.9 %      Neutrophils, Absolute 5.87 10*3/mm3      Lymphocytes, Absolute 1.86 10*3/mm3      Monocytes, Absolute 0.66 10*3/mm3      Eosinophils, Absolute 0.37 10*3/mm3      Basophils, Absolute 0.04 10*3/mm3      Immature Grans, Absolute 0.08 10*3/mm3      nRBC 0.0 /100 WBC           .  Imaging Results (Last 24 Hours)     ** No results found for the last 24 hours. **          Active Hospital Problems    Diagnosis  POA   • **Intractable nausea and vomiting [R11.2]  Yes   • Severe malnutrition (CMS/HCC) [E43]  Yes   • Hypophosphatemia [E83.39]  No   • Hypokalemia [E87.6]  Yes   • UTI (urinary tract infection), bacterial [N39.0, A49.9]  Yes   • Malfunction of nephrostomy tube (HCC) [T83.098A]  Yes   • Lactic acidosis [E87.2]  Yes   • Sepsis secondary to UTI (HCC) [A41.9, N39.0]  Yes   • Essential (primary) hypertension [I10]  Yes       Impression  1. AMS, drowsiness. During EEG, patient did spontaneously open eyes and look at examiner. At this point,  I feel patient lack of participation and current state  May be post Covid encephalopathy given her prolonged Covid recovery and extended complications and course with underlying UTI and metabolic disturbances.    2. Hypokalemia.  3. UTI  4. Malfunction of nephrostomy tube.  5. Malnutrition.      Plan  1. EEG.  2. CT head  3. Check magnesium, B12, folate, iron profile, ammonia level  4. Supportive care.  5. Agree with Palliative Care to establish goals of care.      I discussed the patients findings and my recommendations with patient and nursing staff    Verónica Erickson, BERT  04/11/22  14:38 CDT

## 2022-04-12 LAB
ANION GAP SERPL CALCULATED.3IONS-SCNC: 12 MMOL/L (ref 5–15)
BUN SERPL-MCNC: 7 MG/DL (ref 6–20)
BUN/CREAT SERPL: 9.9 (ref 7–25)
CALCIUM SPEC-SCNC: 8.6 MG/DL (ref 8.6–10.5)
CHLORIDE SERPL-SCNC: 105 MMOL/L (ref 98–107)
CO2 SERPL-SCNC: 24 MMOL/L (ref 22–29)
CREAT SERPL-MCNC: 0.71 MG/DL (ref 0.57–1)
DEPRECATED RDW RBC AUTO: 47.4 FL (ref 37–54)
EGFRCR SERPLBLD CKD-EPI 2021: 107.7 ML/MIN/1.73
ERYTHROCYTE [DISTWIDTH] IN BLOOD BY AUTOMATED COUNT: 15.3 % (ref 12.3–15.4)
FOLATE SERPL-MCNC: 4 NG/ML (ref 4.78–24.2)
GLUCOSE SERPL-MCNC: 79 MG/DL (ref 65–99)
HCT VFR BLD AUTO: 30.9 % (ref 34–46.6)
HGB BLD-MCNC: 10.1 G/DL (ref 12–15.9)
IRON 24H UR-MRATE: 62 MCG/DL (ref 37–145)
IRON SATN MFR SERPL: 64 % (ref 20–50)
MCH RBC QN AUTO: 27.7 PG (ref 26.6–33)
MCHC RBC AUTO-ENTMCNC: 32.7 G/DL (ref 31.5–35.7)
MCV RBC AUTO: 84.9 FL (ref 79–97)
PLATELET # BLD AUTO: 287 10*3/MM3 (ref 140–450)
PMV BLD AUTO: 9.7 FL (ref 6–12)
POTASSIUM SERPL-SCNC: 3.9 MMOL/L (ref 3.5–5.2)
RBC # BLD AUTO: 3.64 10*6/MM3 (ref 3.77–5.28)
SODIUM SERPL-SCNC: 141 MMOL/L (ref 136–145)
TIBC SERPL-MCNC: 97 MCG/DL (ref 298–536)
TRANSFERRIN SERPL-MCNC: 65 MG/DL (ref 200–360)
VIT B12 BLD-MCNC: 1079 PG/ML (ref 211–946)
WBC NRBC COR # BLD: 8.15 10*3/MM3 (ref 3.4–10.8)

## 2022-04-12 PROCEDURE — 99231 SBSQ HOSP IP/OBS SF/LOW 25: CPT | Performed by: CLINICAL NURSE SPECIALIST

## 2022-04-12 PROCEDURE — 84466 ASSAY OF TRANSFERRIN: CPT | Performed by: CLINICAL NURSE SPECIALIST

## 2022-04-12 PROCEDURE — 83540 ASSAY OF IRON: CPT | Performed by: CLINICAL NURSE SPECIALIST

## 2022-04-12 PROCEDURE — 85027 COMPLETE CBC AUTOMATED: CPT | Performed by: FAMILY MEDICINE

## 2022-04-12 PROCEDURE — 80048 BASIC METABOLIC PNL TOTAL CA: CPT | Performed by: FAMILY MEDICINE

## 2022-04-12 PROCEDURE — 99232 SBSQ HOSP IP/OBS MODERATE 35: CPT

## 2022-04-12 PROCEDURE — 97162 PT EVAL MOD COMPLEX 30 MIN: CPT | Performed by: PHYSICAL THERAPIST

## 2022-04-12 RX ORDER — FOLIC ACID 1 MG/1
1 TABLET ORAL DAILY
Status: DISCONTINUED | OUTPATIENT
Start: 2022-04-12 | End: 2022-04-14

## 2022-04-12 RX ADMIN — Medication 2 SPRAY: at 20:51

## 2022-04-12 RX ADMIN — PANTOPRAZOLE SODIUM 40 MG: 40 TABLET, DELAYED RELEASE ORAL at 06:31

## 2022-04-12 RX ADMIN — FLUTICASONE PROPIONATE 2 SPRAY: 50 SPRAY, METERED NASAL at 09:37

## 2022-04-12 RX ADMIN — SODIUM CHLORIDE, POTASSIUM CHLORIDE, SODIUM LACTATE AND CALCIUM CHLORIDE 75 ML/HR: 600; 310; 30; 20 INJECTION, SOLUTION INTRAVENOUS at 05:26

## 2022-04-12 RX ADMIN — METOPROLOL SUCCINATE 50 MG: 50 TABLET, FILM COATED, EXTENDED RELEASE ORAL at 09:36

## 2022-04-12 RX ADMIN — Medication 10 ML: at 09:37

## 2022-04-12 RX ADMIN — NEOMYCIN SULFATE, POLYMYXIN B SULFATE AND HYDROCORTISONE 4 DROP: 10; 3.5; 1 SUSPENSION/ DROPS AURICULAR (OTIC) at 13:20

## 2022-04-12 RX ADMIN — NEOMYCIN SULFATE, POLYMYXIN B SULFATE AND HYDROCORTISONE 4 DROP: 10; 3.5; 1 SUSPENSION/ DROPS AURICULAR (OTIC) at 05:26

## 2022-04-12 RX ADMIN — SODIUM BICARBONATE 650 MG: 650 TABLET ORAL at 20:51

## 2022-04-12 RX ADMIN — RIVAROXABAN 20 MG: 20 TABLET, FILM COATED ORAL at 20:51

## 2022-04-12 RX ADMIN — FLUTICASONE PROPIONATE 2 SPRAY: 50 SPRAY, METERED NASAL at 20:51

## 2022-04-12 RX ADMIN — SODIUM CHLORIDE, POTASSIUM CHLORIDE, SODIUM LACTATE AND CALCIUM CHLORIDE 75 ML/HR: 600; 310; 30; 20 INJECTION, SOLUTION INTRAVENOUS at 18:42

## 2022-04-12 RX ADMIN — FLUOXETINE HYDROCHLORIDE 20 MG: 20 CAPSULE ORAL at 20:51

## 2022-04-12 RX ADMIN — SODIUM BICARBONATE 650 MG: 650 TABLET ORAL at 09:37

## 2022-04-12 RX ADMIN — NEOMYCIN SULFATE, POLYMYXIN B SULFATE AND HYDROCORTISONE 4 DROP: 10; 3.5; 1 SUSPENSION/ DROPS AURICULAR (OTIC) at 18:31

## 2022-04-12 RX ADMIN — PANTOPRAZOLE SODIUM 40 MG: 40 TABLET, DELAYED RELEASE ORAL at 18:31

## 2022-04-12 RX ADMIN — DOCUSATE SODIUM 50 MG AND SENNOSIDES 8.6 MG 2 TABLET: 8.6; 5 TABLET, FILM COATED ORAL at 09:36

## 2022-04-12 RX ADMIN — BISACODYL 10 MG: 10 SUPPOSITORY RECTAL at 09:36

## 2022-04-12 RX ADMIN — Medication 2 SPRAY: at 09:37

## 2022-04-12 RX ADMIN — FOLIC ACID 1 MG: 1 TABLET ORAL at 13:21

## 2022-04-12 NOTE — PROGRESS NOTES
"Palliative Care Daily Progress Note   Chief complaint: goals of care/advanced care planning    Code Status and Medical Interventions:   Ordered at: 04/03/22 0411     Level Of Support Discussed With:    Patient     Code Status (Patient has no pulse and is not breathing):    CPR (Attempt to Resuscitate)     Medical Interventions (Patient has pulse or is breathing):    Full Support     Subjective   Medical record reviewed. Events noted. Neurology consulted yesterday by attending for change in mental status. Neurology completed exam and felt neuro exam suggested some psychogenic overlay but could be related to metabolic encephalopathy. Originally had orders for MRI however changed to CT of head which revealed no acute intracranial abnormalities. An EEG was also ordered and official report pending. Preliminary report of EEG revealed generalized slowing likely related to metabolic disturbances. PT has evaluated and signed off as she is unable to participate and not appropriate for therapy. When first entered room she was awake, moaning and moving bilateral upper extremities. As soon as she was spoken to she closed her eyes and stopped moving extremities. She would not open her eyes or follow any commands. No visitors at bedside.     Advance Care Planning   Advance Care Planning Discussion:  Spoke to patient's spouse, Grant, and provided update. Notified him of his full voicemail as well as he expressed frustration with being unable to determine who had attempted to call him. Reports he was unaware of this. Discussed concerns with altered mental status. He reflected on conversation his mother-in-law had with neurology yesterday and reports he is understands \"her brain is working slowly because of COVID.\" Explained that there are concerns for this or metabolic abnormalities however it was difficult to pinpoint a primary cause. Shared that his spouse was alert when first entered room earlier today however when she realized " "provider was present she closed her eyes and would not participate in exam. He explained that he is concerned this is due to her difficulty hearing. Explained treatment teams concerns as hearing loss typically does not cause a patient to refuse interaction or ability to follow all commands. Discussed interventions including writing and/or acting things out however she has not been able to participate with those tasks. He again expressed concerns and reflected on his spouses ability to speak and follow commands when admitted and during the early part of her hospitalization. Grant expressed, \"It's a scary situation because she hasn't been able to talk in several days.\" Grant expressed again, \"I just want her to have everything done to find out what is causing this.\" Unsure he is completely understanding symptoms/behaviors his spouse is experiencing or prognostic awareness at current state. Plans to arrive to hospital around 4:15 PM to discuss further with attending.     Advance Directive Status: Patient does not have advance directive     Due to the Palliative Care Topics Discussed: palliative care, goals of care, care options and discharge options we will establish an advance care plan.   Goals of care: Ongoing.    The patient receives support from her spouse, parent and extended family.  POA/Healthcare Surrogate - Next of kin is her spouse, Grant.     Review of Systems   Unable to perform ROS: patient nonverbal     Pain Assessment  Nonverbal Indicators of Pain: grimace  CPOT and PAINAD Scales: PAINAD (Pain Assessment in Advance Dementia Scale)  PAINAD Breathin-->normal  PAINAD Negative Vocalization: 0-->none  PAINAD Facial Expression: 0-->smiling or inexpressive  PAINAD Body Language: 0-->relaxed  PAINAD Consolability: 0-->no need to console  PAINAD Score: 0  Pain Location: abdomen    Objective   Diagnostics: Reviewed      Intake/Output Summary (Last 24 hours) at 2022 0923  Last data filed at 2022 " 2100  Gross per 24 hour   Intake 165.51 ml   Output 450 ml   Net -284.49 ml     Current Facility-Administered Medications   Medication Dose Route Frequency Provider Last Rate Last Admin   • bisacodyl (DULCOLAX) suppository 10 mg  10 mg Rectal Daily Amado Villarreal MD   10 mg at 04/11/22 0851   • FLUoxetine (PROzac) capsule 20 mg  20 mg Oral Nightly Amado Villarreal MD   20 mg at 04/11/22 2016   • fluticasone (FLONASE) 50 MCG/ACT nasal spray 2 spray  2 spray Each Nare BID Frank Ricks MD   2 spray at 04/11/22 2017   • hydrALAZINE (APRESOLINE) injection 10 mg  10 mg Intravenous Q4H PRN Amado Villarreal MD       • labetalol (NORMODYNE,TRANDATE) injection 10 mg  10 mg Intravenous Q4H PRN Amado Villarreal MD   10 mg at 04/10/22 1518   • lactated ringers infusion  75 mL/hr Intravenous Continuous Amado Villarreal MD 75 mL/hr at 04/12/22 0526 75 mL/hr at 04/12/22 0526   • metoprolol succinate XL (TOPROL-XL) 24 hr tablet 50 mg  50 mg Oral Q24H Amado Villarreal MD   50 mg at 04/11/22 0851   • neomycin-polymyxin-hydrocortisone (CORTISPORIN) otic suspension 4 drop  4 drop Both Ears Q6H Fermin Mcclure DO   4 drop at 04/12/22 0526   • ondansetron (ZOFRAN) tablet 4 mg  4 mg Oral Q6H PRN Frank Ricks MD        Or   • ondansetron (ZOFRAN) injection 4 mg  4 mg Intravenous Q6H PRN Frank Ricks MD   4 mg at 04/07/22 0904   • oxymetazoline (AFRIN) nasal spray 2 spray  2 spray Nasal BID Frank Ricks MD   2 spray at 04/11/22 2017   • pantoprazole (PROTONIX) EC tablet 40 mg  40 mg Oral BID AC Amado Villarreal MD   40 mg at 04/12/22 0631   • rivaroxaban (XARELTO) tablet 20 mg  20 mg Oral Nightly Amado Villarreal MD   20 mg at 04/11/22 2016   • sennosides-docusate (PERICOLACE) 8.6-50 MG per tablet 2 tablet  2 tablet Oral Daily Amado Villarreal MD   2 tablet at 04/11/22 0851   • sodium bicarbonate tablet 650 mg  650 mg Oral BID Fermin Mcclure DO   650 mg at 04/11/22 2016   • sodium chloride 0.9 % flush 10 mL  10 mL  "Intravenous PRN Frank Ricks MD       • sodium chloride 0.9 % flush 10 mL  10 mL Intravenous PRN Frank Ricks MD       • sodium chloride 0.9 % flush 10 mL  10 mL Intravenous PRN Frank Ricks MD       • sodium chloride 0.9 % flush 10 mL  10 mL Intravenous Q12H Frank Ricks MD   10 mL at 04/11/22 2020   • sodium chloride 0.9 % flush 10 mL  10 mL Intravenous PRN Frank Ricks MD   10 mL at 04/10/22 1518   • SUMAtriptan (IMITREX) tablet 50 mg  50 mg Oral Once Frank Ricks MD         lactated ringers, 75 mL/hr, Last Rate: 75 mL/hr (04/12/22 0526)      hydrALAZINE  •  labetalol  •  ondansetron **OR** ondansetron  •  sodium chloride  •  [COMPLETED] Insert peripheral IV **AND** sodium chloride  •  [COMPLETED] Insert peripheral IV **AND** sodium chloride  •  sodium chloride    Assessment:  Vital Signs: /82 (BP Location: Left arm, Patient Position: Lying)   Pulse 86   Temp 98 °F (36.7 °C) (Axillary)   Resp 16   Ht 170.2 cm (67\")   Wt 71.6 kg (157 lb 14.4 oz)   SpO2 98%   BMI 24.73 kg/m²     Physical Exam  Vitals and nursing note reviewed.   Constitutional:       General: She is sleeping. She is not in acute distress.     Appearance: She is ill-appearing.   HENT:      Head: Normocephalic and atraumatic.   Eyes:      General: Lids are normal.   Cardiovascular:      Rate and Rhythm: Normal rate and regular rhythm.   Pulmonary:      Effort: Pulmonary effort is normal.      Breath sounds: Decreased breath sounds present.   Abdominal:      General: Bowel sounds are normal. There is no distension.      Palpations: Abdomen is soft.      Comments: Mepilex to midline abdominal incision, CDI.    Genitourinary:     Comments: Left sided nephrostomy.  Musculoskeletal:      Right foot: Foot drop present.      Left foot: Foot drop present.      Comments: Multipodus boots present to BLE    Skin:     General: Skin is warm and dry.      Coloration: Skin is pale.   Neurological:      Comments: CARLI; " alert when entered room but quickly closed eyes and refused to open eyes or follow any commands   Psychiatric:         Speech: She is noncommunicative.         Behavior: Behavior is uncooperative.         Cognition and Memory: Cognition is impaired.      Comments: CARLI; alert when entered room but quickly closed eyes and refused to open eyes or follow any commands     Patient status: Disease state: Controlled with current treatments.  Functional status: Palliative Performance Scale Score: Performance 10% based on the following measures: Ambulation: Totally bed bound, Activity and Evidence of Disease: Unable to do any work, extensive evidence of disease, Self-Care: Total care required,  Intake: Mouth care only, LOC: Drowsy or comatose   Nutritional status: Albumin 2.30.Body mass index is 24.73 kg/m².    Impression/Problem List:  1.    Poor performance status  2.    Altered mental status  3.    Severe malnutrition  4.    Urinary tract infection  5.    Sepsis secondary to UTI  6.    Hypertension  7.    Malfunctioning nephrostomy tube  8.    Lactic acidosis  9.    Impaired functioning of gallbladder (sludge-filled and 20% EF per imaging)  10.  Hepatic steatosis  11.  Anemia  12.  Intractable nausea and vomiting  13.  Pleural effusion, left  14.  Folate deficiency  15.  Anxiety  16.  History of COVID-19 (August 2021)  17.  Hypokalemia, resolved  18.  Hypocalcemia, resolved     Plans/Recommendations     1. Goals of care include CODE STATUS CPR with full support interventions.    2. Palliative care encounter  - Prognosis is guarded long-term secondary to poor performance status, altered mental status, severe malnutrition, urinary tract infection, sepsis, hepatic steatosis, anemia, impaired gallbladder functioning, recent complex medical history including renal failure requiring temporary dialysis, nephrostomy placement, bladder rupture and many other comorbidities listed above.   - Neurology has signed off however feel that  "mental status changes are related to metabolic changes.   - Updated spouse, Grant, as patient unable to provide information or participate. He expressed concerns regarding her difficulty hearing. Explained hearing loss typically does not cause mental status change and that patient's are usually able to interact through other means of communication.   - Expressed, \"It's a scary situation because she hasn't been able to talk in several days.\" Grant expressed again, \"I just want her to have everything done to find out what is causing this.\"  - Unsure he is completely understanding symptoms/behaviors his spouse is experiencing or prognostic awareness at current state.   - Plans to arrive to hospital around 4:15 PM to discuss further with attending.       Thank you for allowing us to participate in patient's plan of care. Palliative Care Team will continue to follow patient.     Time spent: 30 minutes spent reviewing medical and medication records, assessing and examining patient, discussing with family, answering questions, providing some guidance about a plan and documentation of care, and coordinating care with other healthcare members, with > 50% time spent face to face.     Nneka Barton, APRN  4/12/2022    "

## 2022-04-12 NOTE — PROGRESS NOTES
[facility]  INPATIENT PROGRESS NOTE    Length of Stay: 8  Date of Admission: 4/2/2022  Primary Care Physician: David Lara MD    Subjective   Chief Complaint: Failure to thrive/metabolic acidosis    HPI   Neurology has signed off.  Nausea vomit resolved.  Blood pressure stable.  Afebrile.  Metabolic acidosis also resolved.    Review of Systems   Unable to obtain due to somnolence.  Patient no acute distress.    All pertinent negatives and positives are as above. All other systems have been reviewed and are negative unless otherwise stated.     Objective    Temp:  [96 °F (35.6 °C)-98.2 °F (36.8 °C)] 98 °F (36.7 °C)  Heart Rate:  [82-94] 86  Resp:  [16-20] 16  BP: (102-143)/(68-87) 143/82    Intake/Output Summary (Last 24 hours) at 4/12/2022 0943  Last data filed at 4/11/2022 2100  Gross per 24 hour   Intake 165.51 ml   Output 450 ml   Net -284.49 ml     Physical Exam  Vitals and nursing note reviewed.   Constitutional:       Appearance: She is well-developed.   HENT:      Head: Normocephalic.   Eyes:      Conjunctiva/sclera: Conjunctivae normal.      Pupils: Pupils are equal, round, and reactive to light.   Neck:      Vascular: No JVD.   Cardiovascular:      Rate and Rhythm: Regular rhythm.  Regular rate.     Heart sounds: Normal heart sounds. No murmur heard.    No friction rub. No gallop.   Pulmonary:      Effort: No respiratory distress.      Breath sounds: No wheezing or rales.      Comments: Diminished breath sound bilateral, clear .  Chest:      Chest wall: No tenderness.   Abdominal:      General: Bowel sounds are normal. There is no distension.      Palpations: Abdomen is soft.      Tenderness: There is no abdominal tenderness. There is no guarding or rebound.   Musculoskeletal:         General: No tenderness or deformity.      Cervical back: Neck supple.   Skin:     General: Skin is warm and dry.      Capillary Refill: Capillary refill takes 2  to 3 seconds.      Findings: No rash.   Neurological:      Cranial Nerves: No cranial nerve deficit.      Motor: Weakness present. No abnormal muscle tone.      Coordination: Coordination abnormal.      Gait: Gait abnormal.      Deep Tendon Reflexes: Reflexes normal.   Psychiatric:       Patient refusing to talk.  Selective who she wants to talk to.    Results Review:  Lab Results (last 24 hours)     Procedure Component Value Units Date/Time    Basic Metabolic Panel [[id number]]  (Normal) Collected: 04/12/22 0622    Specimen: Blood Updated: 04/12/22 0732     Glucose 79 mg/dL      BUN 7 mg/dL      Creatinine 0.71 mg/dL      Sodium 141 mmol/L      Potassium 3.9 mmol/L      Chloride 105 mmol/L      CO2 24.0 mmol/L      Calcium 8.6 mg/dL      BUN/Creatinine Ratio 9.9     Anion Gap 12.0 mmol/L      eGFR 107.7 mL/min/1.73      Comment: National Kidney Foundation and American Society of Nephrology (ASN) Task Force recommended calculation based on the Chronic Kidney Disease Epidemiology Collaboration (CKD-EPI) equation refit without adjustment for race.       Narrative:      GFR Normal >60  Chronic Kidney Disease <60  Kidney Failure <15      Iron Profile [[id number]]  (Abnormal) Collected: 04/12/22 0622    Specimen: Blood Updated: 04/12/22 0732     Iron 62 mcg/dL      Iron Saturation 64 %      Transferrin 65 mg/dL      TIBC 97 mcg/dL     CBC (No Diff) [[id number]]  (Abnormal) Collected: 04/12/22 0622    Specimen: Blood Updated: 04/12/22 0711     WBC 8.15 10*3/mm3      RBC 3.64 10*6/mm3      Hemoglobin 10.1 g/dL      Hematocrit 30.9 %      MCV 84.9 fL      MCH 27.7 pg      MCHC 32.7 g/dL      RDW 15.3 %      RDW-SD 47.4 fl      MPV 9.7 fL      Platelets 287 10*3/mm3     Folate [[id number]]  (Abnormal) Collected: 04/11/22 1746    Specimen: Blood Updated: 04/12/22 0211     Folate 4.00 ng/mL     Narrative:      Results may be falsely increased if patient taking Biotin.      Vitamin B12 [[id number]]  (Abnormal) Collected: 04/11/22  1746    Specimen: Blood Updated: 04/12/22 0211     Vitamin B-12 1,079 pg/mL     Narrative:      Results may be falsely increased if patient taking Biotin.      Ammonia [[id number]]  (Normal) Collected: 04/11/22 1746    Specimen: Blood Updated: 04/11/22 1806     Ammonia 20 umol/L     Magnesium [[id number]]  (Normal) Collected: 04/11/22 1746    Specimen: Blood Updated: 04/11/22 1800     Magnesium 1.8 mg/dL            Cultures:  No results found for: BLOODCX, URINECX, WOUNDCX, MRSACX, RESPCX, STOOLCX    Radiology Data:    Imaging Results (Last 24 Hours)     Procedure Component Value Units Date/Time    CT Head Without Contrast [[id number]] Collected: 04/11/22 1638     Updated: 04/11/22 1642    Narrative:      EXAMINATION: CT HEAD WO CONTRAST-      4/11/2022 4:09 PM CDT     HISTORY: AMS; E87.6-Hypokalemia; N39.0-Urinary tract infection, site not  specified; R31.9-Hematuria, unspecified; R11.2-Nausea with vomiting,  unspecified; E87.2-Acidosis     In order to have a CT radiation dose as low as reasonably achievable  Automated Exposure Control was utilized for adjustment of the mA and/or  KV according to patient size.     DLP in mGycm= 566.     Axial, sagittal, and coronal noncontrast CT imaging of the head.     The visualized paranasal sinuses are clear.     The brain and ventricles have an age appropriate appearance.   There is no hemorrhage or mass-effect.   No acute infarction is seen.     No calvarial abnormality.       Impression:      1. No acute intracranial abnormality is seen.                                         This report was finalized on 04/11/2022 16:39 by Dr. Raúl Pan MD.          Allergies   Allergen Reactions   • Coconut Unknown - High Severity   • Nuts Unknown - High Severity   • Penicillins    • [place] Other (See Comments)     Causes migraines per pt reports       Scheduled meds:   bisacodyl, 10 mg, Rectal, Daily  FLUoxetine, 20 mg, Oral, Nightly  fluticasone, 2 spray, Each Nare, BID  folic acid, 1  mg, Oral, Daily  metoprolol succinate XL, 50 mg, Oral, Q24H  neomycin-polymyxin-hydrocortisone, 4 drop, Both Ears, Q6H  oxymetazoline, 2 spray, Nasal, BID  pantoprazole, 40 mg, Oral, BID AC  rivaroxaban, 20 mg, Oral, Nightly  senna-docusate sodium, 2 tablet, Oral, Daily  sodium bicarbonate, 650 mg, Oral, BID  sodium chloride, 10 mL, Intravenous, Q12H  SUMAtriptan, 50 mg, Oral, Once        PRN meds:  hydrALAZINE  •  labetalol  •  ondansetron **OR** ondansetron  •  sodium chloride  •  [COMPLETED] Insert peripheral IV **AND** sodium chloride  •  [COMPLETED] Insert peripheral IV **AND** sodium chloride  •  sodium chloride    Assessment/Plan       Intractable nausea and vomiting    Sepsis secondary to UTI (HCC)    Lactic acidosis    Hypokalemia    Essential (primary) hypertension    UTI (urinary tract infection), bacterial    Malfunction of nephrostomy tube (HCC)    Severe malnutrition (CMS/HCC)    Hypophosphatemia      Plan:  HPI.  Patient was admitted on 4/3 by Dr. Ricks.  Had COVID-19 here last year and ended up having an abdominal hematoma and had to go to [place].  Was there for a long time and then an LTAC and then a skilled nursing facility.  Comes back with multidrug-resistant UTI on Invanz.   She presented with complaints of intractable nausea and vomiting.  Her medical problems started last August when she developed COVID-19 pneumonia and had numerous complications thereafter.  She developed an abdominal hematoma and had to go on dialysis secondary to extrinsic compression of her urinary system. She was transferred from here to [facility] and required exploratory laparotomy.  She ended up having bilateral percutaneous nephrostomy tubes and also ended up with a left ureteral stent.  She states that her main urologist is Dr. Rina Rey.  She states that she stopped producing urine from her percutaneous nephrostomy tube recently and that there are plans for it to be removed.  She states that  she had gotten in touch with [facility] to see if someone here could do it so she would not have to travel.  When she left [facility], she spent considerable time at a skilled nursing facility in [place], [place].  She states that she has only been home a few weeks.  She was admitted to [facility] in [place] on 3/9 for electrolyte abnormalities.     UTI.  Renal function stable yesterday with a creatinine 0.76.  Dr. Ricks began treating a urinary tract infection with ceftriaxone.  She had a multidrug-resistant Klebsiella in October 2021.  I transitioned her to [facility] on 4/3.  Lactate down to 1.7 from 2.8 after fluids.  Urine does appear infected with too numerous to count white blood cells, 4+ bacteria and large leukocyte esterase. The sample was also bloody.  Urine culture has grown 2 distinct pathogens.  There is a multidrug-resistant Proteus mirabilis strain and an ESBL Klebsiella strain.  Invanz should be treating both of these organisms concurrently.  Today is day 5 of [facility].  We will treat 7-10 days.  Lactic acidosis improved after fluids and antibiotics.  Procalcitonin elevated at 0.31.  Dr. Dyer is familiar with her previous problems as he saw her last year.  I consulted him to evaluate her.  He wants to treat her current infection and then have her follow back at [facility].  On Invanz.  Recommendation from urology-  Urology saw her and wants her to go back to [facility] to have her percutaneous nephrostomy pulled.     Hypokalemia .    Resolved.     Sludge in gallbladder/dysfunctional gallbladder?.  General surgery consult.  GI consult. No surgery per general surgery.  CT scan abdomen pelvic-persistent mild but improved urothelial thickening involving the left renal pelvis and proximal left ureter- related to resolving UTI,  internal/external left-sided nephroureterostomy tube appears in satisfactory position, mild left-sided hydronephrosis which is decreased, Small amount gas is noted  in the bladder,  No significant bladder wall thickening is identified, Small left pleural effusion- Increased hazy airspace opacities in the lung bases favored to represent diffuse atelectasis,  Based on the volume of gas within the GI tract- mild ileus may be present,  No evidence of bowel obstruction, Small amount fluid within the upper vagina where previously there was a small amount of gas, Hepatic steatosis.  HIDA scan-20% biliary ejection fraction.  Ultrasound abdomen pelvic-Sludge-filled gallbladder with no significant gallbladder wall thickening, pericholecystic fluid or convincing evidence of acute cholecystitis, Mild dilation of the common hepatic duct with no visualized choledocholithiasis, Hepatic steatosis.     Nausea/vomiting.  Nausea vomiting resolved.  DC Reglan.  Protonix.  Bicarb. DC Inapsine as needed.  DC Compazine as needed. DC Phenergan as needed.     Sinus congestion/sinus pain.  Flonase.  Corticosporin eardrop.  ENT consult.  Afrin.  Dr. Nichols saw her and has wanted to do an audiogram, but she has declined to go over to his office the last 2 days.   CTA chest-No evidence of pulmonary embolus, Small LEFT pleural effusion, Bandlike areas of reticulation throughout both lungs likely representing scarring/fibrosis related to prior Covid 19 infection.      Hypertension/tachycardia.  Tachycardia resolved.   Cut back Toprol.     History of pulmonary embolus.  Hold Xarelto possible surgery.    DC Lovenox and put patient back on Xarelto 4/11/2022.     Anxiety/depression/insomnia . Decrease Prozac nightly due to somnolence.   DC Ambien as needed due to somnolence.  DC trazodone due to some.     Headaches/altered mental status.    DC Fioricet as needed due to somnolence.   Neurology signed off.  CT scan head-No acute intracranial abnormality is seen.  EEG-pending.     Pain.  DC Robaxin as needed due to somnolence. Morphine as needed.    Anemia.  Folate deficiency . p.o. folate.  No sign of acute bleed.   Hemoglobin stable.     Allergic rhinitis.  Flonase.     Nutrition.  Full liquid diet.       Deconditioning.  PT and OT consult.  Last time patient walk was in August of last year after Covid. Patient is mostly bedbound at the nursing home.     Blood culture-contamination with coagulase negative Staphylococcus.  Urine culture shows Proteus and Klebsiella.  Covid-19-negative.  Flu screen A and B-negative.     Discharge Plannin to 6 days.     agree with rehab placement in [place] or [place].  Family refused LTAC placement.    Electronically signed by Amado Villarreal MD, 22, 9:43 AM CDT.

## 2022-04-12 NOTE — PROGRESS NOTES
Neurology Progress Note      Chief Complaint:  F/u change in mental status.  difficult hearing says family    Subjective     Subjective:  Patient lying in bed with eyes open. No family at bedside. Patient remains nonverbal but did cry out spontaneously twice. She also grimaced with PROM of bilateral UE.     CT head showed no acute process.  EEG preliminary report shows generalized slowing likely relate to metabolic disturbances.    B12- 1079  Folate 4  Mg+ 1.8  Ammonia- 20      Medications:  Current Facility-Administered Medications   Medication Dose Route Frequency Provider Last Rate Last Admin   • bisacodyl (DULCOLAX) suppository 10 mg  10 mg Rectal Daily Amado Villarreal MD   10 mg at 04/11/22 0851   • FLUoxetine (PROzac) capsule 20 mg  20 mg Oral Nightly Amado Villarreal MD   20 mg at 04/11/22 2016   • fluticasone (FLONASE) 50 MCG/ACT nasal spray 2 spray  2 spray Each Nare BID Frank Ricks MD   2 spray at 04/11/22 2017   • hydrALAZINE (APRESOLINE) injection 10 mg  10 mg Intravenous Q4H PRN Amado Villarreal MD       • labetalol (NORMODYNE,TRANDATE) injection 10 mg  10 mg Intravenous Q4H PRN Amado Villarreal MD   10 mg at 04/10/22 1518   • lactated ringers infusion  75 mL/hr Intravenous Continuous Amado Villarreal MD 75 mL/hr at 04/12/22 0526 75 mL/hr at 04/12/22 0526   • metoprolol succinate XL (TOPROL-XL) 24 hr tablet 50 mg  50 mg Oral Q24H Amado Villarreal MD   50 mg at 04/11/22 0851   • neomycin-polymyxin-hydrocortisone (CORTISPORIN) otic suspension 4 drop  4 drop Both Ears Q6H Fermin Mcclure DO   4 drop at 04/12/22 0526   • ondansetron (ZOFRAN) tablet 4 mg  4 mg Oral Q6H PRN Frank Ricks MD        Or   • ondansetron (ZOFRAN) injection 4 mg  4 mg Intravenous Q6H PRN Frank Ricks MD   4 mg at 04/07/22 0904   • oxymetazoline (AFRIN) nasal spray 2 spray  2 spray Nasal BID Frank Ricks MD   2 spray at 04/11/22 2017   • pantoprazole (PROTONIX) EC tablet 40 mg  40 mg Oral BID AC Amado Villarreal,  MD   40 mg at 04/12/22 0631   • rivaroxaban (XARELTO) tablet 20 mg  20 mg Oral Nightly Amado Villarreal MD   20 mg at 04/11/22 2016   • sennosides-docusate (PERICOLACE) 8.6-50 MG per tablet 2 tablet  2 tablet Oral Daily Amado Villarreal MD   2 tablet at 04/11/22 0851   • sodium bicarbonate tablet 650 mg  650 mg Oral BID Fermin Mcclure DO   650 mg at 04/11/22 2016   • sodium chloride 0.9 % flush 10 mL  10 mL Intravenous PRN Frank Ricks MD       • sodium chloride 0.9 % flush 10 mL  10 mL Intravenous PRN Frank Ricks MD       • sodium chloride 0.9 % flush 10 mL  10 mL Intravenous PRN Frank Ricks MD       • sodium chloride 0.9 % flush 10 mL  10 mL Intravenous Q12H Frank Ricks MD   10 mL at 04/11/22 2020   • sodium chloride 0.9 % flush 10 mL  10 mL Intravenous PRN Frank Ricks MD   10 mL at 04/10/22 1518   • SUMAtriptan (IMITREX) tablet 50 mg  50 mg Oral Once Frank Ricks MD           Review of Systems:   -A 14 point review of systems is not able to be completed as patient is nonverbal.         Objective      Vital Signs  Temp:  [96 °F (35.6 °C)-98.2 °F (36.8 °C)] 98 °F (36.7 °C)  Heart Rate:  [82-94] 86  Resp:  [16-20] 16  BP: (102-143)/(68-87) 143/82    Physical Exam:  General Exam:  Patient looks chronically ill  Head:  Normal cephalic, atraumatic  HEENT:  Neck supple  Fundoscopic Exam:  No signs of disc edema  CVS:  Regular rate and rhythm.  No murmurs  Carotid Examination:  No bruits  Lungs:  Clear to auscultation  Abdomen:  Non-tender, Non-distended  Extremities:  No signs of peripheral edema  Skin:  No rashes     Neurologic Exam:     Mental Status:    -awake. Lying in bed with eyes open  -nonverbal.  Patient cries out with repositioning and did yell out twice without auditory/tactile stimuli.  Not following commands.       CN II:  Visual fields full. Pupils equally reactive to light. Blink to threat bilateral. Patient would not look to examiner.   CN III, IV, VI:  Extraocular  Muscles full with no signs of nystagmus  CN V:  Facial sensory is symmetric with no asymmetries.  CN VII:  Facial motor symmetric  CN VIII:  Gross hearing intact bilaterally  CN IX:  Palate elevates symmetrically  CN X:  Palate elevates symmetrically  CN XI:  Shoulder shrug symmetric  CN XII:  Tongue is midline on protrusion     Motor: (strength out of 5:  1= minimal movement, 2 = movement in plane of gravity, 3 = movement against gravity, 4 = movement against some resistance, 5 = full strength)     Patient would not follow commands.  There were some purposeful movements of bilateral upper extremites when patient was holding the bed rail on left and patient did lift right arm to scratch her chest.  Some spontaneous movements of toes bilaterally.        DTR:  -Right              Bicep: 2+         Tricep: 2+        Brachioradialis: 2+              Patella: 2+       Ankle: 2+         Neg Babinski  -Left              Bicep: 2+         Tricep: 2+        Brachioradialis: 2+              Patella: 2+       Ankle: 2+         Neg Babinski     Sensory:  -Intact to light touch, pinprick, temperature, pain, and proprioception     Coordination:  -no increase tone or cog wheeling.     Gait  -not tested. Patient bed bound       Results Review:    I reviewed the patient's new clinical results.    Results from last 7 days   Lab Units 04/12/22  0622 04/11/22  0752 04/10/22  0920   WBC 10*3/mm3 8.15 8.88 11.73*   HEMOGLOBIN g/dL 10.1* 9.7* 10.3*   HEMATOCRIT % 30.9* 28.5* 30.7*   PLATELETS 10*3/mm3 287 256 241        Results from last 7 days   Lab Units 04/12/22  0622 04/11/22  0752 04/10/22  0920 04/09/22  0814 04/08/22  0618   SODIUM mmol/L 141 139 139 136 137   POTASSIUM mmol/L 3.9 3.3* 4.8 4.3 3.7   CHLORIDE mmol/L 105 106 108* 106 107   CO2 mmol/L 24.0 23.0 15.0* 17.0* 19.0*   BUN mg/dL 7 7 11 8 7   CREATININE mg/dL 0.71 0.70 0.82 0.78 0.77   CALCIUM mg/dL 8.6 8.3* 9.1 8.9 8.5*   BILIRUBIN mg/dL  --  0.4  --  0.4 0.4   ALK PHOS  U/L  --  87  --  99 97   ALT (SGPT) U/L  --  9  --  10 12   AST (SGOT) U/L  --  20  --  18 25   GLUCOSE mg/dL 79 102* 101* 72 82        Lab Results   Component Value Date    PHOS 2.6 04/09/2022    MG 1.8 04/11/2022    PROTIME 17.2 (H) 04/08/2022    INR 1.46 (H) 04/08/2022     No components found for: POCGLUC  No components found for: A1C  Lab Results   Component Value Date    HDL 64 (H) 04/10/2022    LDL 75 04/10/2022     No components found for: B12  Lab Results   Component Value Date    TSH 2.410 04/02/2022     CT Head Without Contrast    Result Date: 4/11/2022  1. No acute intracranial abnormality is seen.              This report was finalized on 04/11/2022 16:39 by Dr. Raúl Pan MD.      Assessment/Plan     Hospital Problem List      Intractable nausea and vomiting    Sepsis secondary to UTI (HCC)    Lactic acidosis    Hypokalemia    Essential (primary) hypertension    UTI (urinary tract infection), bacterial    Malfunction of nephrostomy tube (HCC)    Severe malnutrition (CMS/HCC)    Hypophosphatemia    Impression:  1. AMS, drowsiness. During EEG, patient did spontaneously open eyes and look at examiner. At this point, I feel patient lack of participation and current state  May be post Covid encephalopathy given her prolonged Covid recovery and extended complications and course with underlying UTI and metabolic disturbances.    2. Hypokalemia.  3. UTI  4. Malfunction of nephrostomy tube.  5. Malnutrition.   6. Anemia. Defer to attending.      Plan:  1. Supportive care and anticipate patient mental status improves as metabolic disturbances improve. Patient has low reserve given her long and complex medical issues since 9/2021 and any metabolic disruptions, sleep disruptions, may exacerbate her condition and alter her mental status.  2. Agree with Palliative Care to establish goals of care.  3. I attempted to contact patient , Grant at [phone] with no answer and then patient mother, Marquita at  [phone] with no answer.  4. Folic acid 1 mg daily.  5. Neurology sign off.               Verónica Erickson, APRN  04/12/22  08:30 CDT

## 2022-04-12 NOTE — PLAN OF CARE
Goal Outcome Evaluation:  Plan of Care Reviewed With: patient        Progress: no change  Outcome Evaluation: The patient presents with her eyes closed and head tilted to the R against the bedrail. She did not follow any directions for me. She did demonstrates spontanous movement of her UEs but not purposeful. She also wiggled her legs at times. She resisted PROM at times and other times there was no resistance. She does have a contracture of her L ankle into plantarflexion but she is already in multipodus boots. She is not appropriate for PT services at this time. PT to sign off.

## 2022-04-12 NOTE — PLAN OF CARE
Goal Outcome Evaluation:  Plan of Care Reviewed With: patient        Progress: no change  Outcome Evaluation: CARLI orientation level. Pt is non verbal and bedbound. VSS. LOPEZ. Pt gripped with her right hand today and opened her mouth for morning meds. RA. VTE - lovenox. Pt repositioned Q2 hrs. Attempted to feed pt, but pt was non responsive. Pure wick in place and changed. IVF infusing per order. Safety maintained.

## 2022-04-12 NOTE — THERAPY DISCHARGE NOTE
Patient Name: Namita Zabala  : 1975    MRN: [id number]                              Today's Date: 2022       Admit Date: 2022    Visit Dx:     ICD-10-CM ICD-9-CM   1. Hypokalemia  E87.6 276.8   2. Urinary tract infection with hematuria, site unspecified  N39.0 599.0    R31.9 599.70   3. Nausea and vomiting, unspecified vomiting type  R11.2 787.01   4. Lactic acidosis  E87.2 276.2     Patient Active Problem List   Diagnosis   • COVID-19   • Acute respiratory failure with hypoxia (HCC)   • Sepsis secondary to UTI (HCC)   • Acute pulmonary embolism (HCC)   • Chronic migraine   • MVP (mitral valve prolapse)   • Bilateral pneumonia   • Lactic acidosis   • Elevated transaminase level   • JUVENAL (acute kidney injury) (HCC)   • Ileus (HCC)   • Hyperkalemia   • Pelvic hematoma, female   • Acute blood loss anemia   • Cytokine release syndrome, grade 4   • Hypokalemia   • Anxiety   • Essential (primary) hypertension   • Myofascial pain syndrome   • Vitamin B12 deficiency   • Intractable nausea and vomiting   • UTI (urinary tract infection), bacterial   • Malfunction of nephrostomy tube (HCC)   • Severe malnutrition (CMS/HCC)   • Hypophosphatemia     Past Medical History:   Diagnosis Date   • Angina at rest ([facility])    • Migraine     Sees Pain Management for injections.    • MVP (mitral valve prolapse)    • Renal disorder    • Syncope      Past Surgical History:   Procedure Laterality Date   • BREAST BIOPSY Right     benign   • INSERTION HEMODIALYSIS CATHETER Left 2021    Procedure: HEMODIALYSIS CATHETER INSERTION;  Surgeon: Anders Kaur MD;  Location: [place] [address];  Service: Vascular;  Laterality: Left;   • TONSILLECTOMY        General Information     Row Name 2212          Physical Therapy Time and Intention    Document Type evaluation  Uti, post COVID pneumonia, medically complex since COVID last year  -MS     Mode of Treatment physical therapy;individual therapy  -MS      Row Name 04/12/22 0912          General Information    Patient Profile Reviewed yes  -MS     Existing Precautions/Restrictions fall  -MS     Barriers to Rehab medically complex;previous functional deficit  -MS     Row Name 04/12/22 0912          Cognition    Orientation Status (Cognition) unable/difficult to assess  pt only moans at times  -MS     Row Name 04/12/22 0912          Safety Issues, Functional Mobility    Impairments Affecting Function (Mobility) cognition;range of motion (ROM)  -MS           User Key  (r) = Recorded By, (t) = Taken By, (c) = Cosigned By    Initials Name Provider Type    MS GomezMaria Del Carmen, PT, DPT, NCS Physical Therapist               Mobility     Row Name 04/12/22 0912          Bed Mobility    Comment, (Bed Mobility) pt resists any movement  -MS           User Key  (r) = Recorded By, (t) = Taken By, (c) = Cosigned By    Initials Name Provider Type    MS DyerMaria Del Carmen, PT, DPT, NCS Physical Therapist               Obj/Interventions     Row Name 04/12/22 0912          Range of Motion Comprehensive    Comment, General Range of Motion L ankle plantarflexion contracture, all other PROM WFL. PT demonstrates spontaneous movement of B UEs and wiggling of B LEs. She mostly resists PROM  -MS     Row Name 04/12/22 0912          Strength Comprehensive (MMT)    Comment, General Manual Muscle Testing (MMT) Assessment B UEs 3/5 functionally, B LEs functionally 2-/5  -MS     Row Name 04/12/22 0945          Motor Skills    Motor Skills neuro-muscular function;muscle tone  -MS     Muscle Tone bilateral;upper extremity(s);lower extremity(s);clonus;cogwheel rigidity  at times, other times hypotonic  -MS           User Key  (r) = Recorded By, (t) = Taken By, (c) = Cosigned By    Initials Name Provider Type    MS Dyer Maria Del Carmen LULU, PT, DPT, NCS Physical Therapist               Goals/Plan    No documentation.                Clinical Impression     Row Name 04/12/22 0912          Pain    Additional  Documentation Pain Scale: FACES Pre/Post-Treatment (Group)  -MS     Row Name 04/12/22 0912          Pain Scale: FACES Pre/Post-Treatment    Pain: FACES Scale, Pretreatment 0-->no hurt  -MS     Posttreatment Pain Rating 0-->no hurt  -MS     Row Name 04/12/22 0912          Plan of Care Review    Plan of Care Reviewed With patient  -MS     Progress no change  -MS     Outcome Evaluation The patient presents with her eyes closed and head tilted to the R against the bedrail. She did not follow any directions for me. She did demonstrates spontanous movement of her UEs but not purposeful. She also wiggled her legs at times. She resisted PROM at times and other times there was no resistance. She does have a contracture of her L ankle into plantarflexion but she is already in multipodus boots. She is not appropriate for PT services at this time. PT to sign off.  -MS     Kiran Name 04/12/22 0912          Therapy Assessment/Plan (PT)    Criteria for Skilled Interventions Met (PT) does not meet criteria for skilled intervention  -MS     Therapy Frequency (PT) evaluation only  -MS     Row Name 04/12/22 0912          Positioning and Restraints    Post Treatment Position bed  -MS     In Bed fowlers;call light within reach;encouraged to call for assist;side rails up x2;exit alarm on  -MS           User Key  (r) = Recorded By, (t) = Taken By, (c) = Cosigned By    Initials Name Provider Type    Maria Del Carmen Adam R, PT, DPT, NCS Physical Therapist               Outcome Measures     Row Name 04/12/22 0949          How much help from another person do you currently need...    Turning from your back to your side while in flat bed without using bedrails? 1  -MS     Moving from lying on back to sitting on the side of a flat bed without bedrails? 1  -MS     Moving to and from a bed to a chair (including a wheelchair)? 1  -MS     Standing up from a chair using your arms (e.g., wheelchair, bedside chair)? 1  -MS     Climbing 3-5 steps with a  railing? 1  -MS     To walk in hospital room? 1  -MS     AM-PAC 6 Clicks Score (PT) 6  -MS     Row Name 04/12/22 0949          Functional Assessment    Outcome Measure Options AM-PAC 6 Clicks Basic Mobility (PT)  -MS           User Key  (r) = Recorded By, (t) = Taken By, (c) = Cosigned By    Initials Name Provider Type    Maria Del Carmen Adam LULU, PT, DPT, NCS Physical Therapist                PT Recommendation and Plan     Plan of Care Reviewed With: patient  Progress: no change  Outcome Evaluation: The patient presents with her eyes closed and head tilted to the R against the bedrail. She did not follow any directions for me. She did demonstrates spontanous movement of her UEs but not purposeful. She also wiggled her legs at times. She resisted PROM at times and other times there was no resistance. She does have a contracture of her L ankle into plantarflexion but she is already in multipodus boots. She is not appropriate for PT services at this time. PT to sign off.     Time Calculation:    PT Charges     Row Name 04/12/22 0949             Time Calculation    Start Time 0855  15 min checking on pt yesterday and chart review  -MS      Stop Time 0920  -MS      Time Calculation (min) 25 min  -MS      PT Received On 04/12/22  -MS              Untimed Charges    PT Eval/Re-eval Minutes 40  -MS              Total Minutes    Untimed Charges Total Minutes 40  -MS       Total Minutes 40  -MS            User Key  (r) = Recorded By, (t) = Taken By, (c) = Cosigned By    Initials Name Provider Type    Maria Del Carmen Adam LULU, PT, DPT, NCS Physical Therapist              Therapy Charges for Today     Code Description Service Date Service Provider Modifiers Qty    [id number]  PT EVAL MOD COMPLEXITY 3 4/12/2022 Maria Del Carmen Dyer, PT, DPT, NCS GP 1          PT G-Codes  Outcome Measure Options: AM-PAC 6 Clicks Basic Mobility (PT)  AM-PAC 6 Clicks Score (PT): 6  AM-PAC 6 Clicks Score (OT): 14    PT Discharge Summary  Anticipated Discharge  Disposition (PT): other (see comments) (defer to family and physicians)    Maria Del Carmen Dyer, PT, DPT, NCS  4/12/2022

## 2022-04-12 NOTE — PLAN OF CARE
Goal Outcome Evaluation:  Plan of Care Reviewed With: patient        Progress: no change  Outcome Evaluation: CARLI orientation, n/t or pain. Pt lethargic, sleeps most of the day. PPP. LOPEZ, BLE with help. VSS. RA. VTE, xarelto. Bedfast. Consult with case management and Neuro today. EEG and CT done per order. RUE IV placed by vat. IVF running per order. Q2 turning. Incontinent of bowel and bladder. Pure wick. Bed alarm set. Call light w/in reach. Safety maintained.

## 2022-04-12 NOTE — PAYOR COMM NOTE
"AUTH: [id number]    Namita Cooper (46 y.o. Female)             Date of Birth   1975    Social Security Number       Address   [address] [place] [address]    Home Phone   [phone]    MRN   [id number]       [facility]    Marital Status                               Admission Date   4/2/22    Admission Type   Emergency    Admitting Provider   Amado Villarreal MD    Attending Provider   Amado Villarreal MD    Department, Room/Bed   [facility] 3A, 346/1       Discharge Date       Discharge Disposition       Discharge Destination                               Attending Provider: Amado Villarreal MD    Allergies: Coconut, Nuts, Penicillins, [place]    Isolation: Contact   Infection: COVID (History) (09/15/21), ESBL Klebsiella (04/07/22)   Code Status: CPR   Advance Care Planning Activity    Ht: 170.2 cm (67\")   Wt: 71.6 kg (157 lb 14.4 oz)    Admission Cmt: None   Principal Problem: Intractable nausea and vomiting [R11.2]                 Active Insurance as of 4/2/2022     Primary Coverage     Payor Plan Insurance Group Employer/Plan Group    [employer]HEM BLUE CROSS ANTHEM BLUE CROSS BLUE SHIELD PPO [id number]     Payor Plan Address Payor Plan Phone Number Payor Plan Fax Number Effective Dates    [address] [phone]  1/1/2022 - None Entered    [facility] [id number]       Subscriber Name Subscriber Birth Date Member ID       GRANT COOPER GEMA 1975 [id number]           Secondary Coverage     Payor Plan Insurance Group Employer/Plan Group    MEDICARE MEDICARE A & B      Payor Plan Address Payor Plan Phone Number Payor Plan Fax Number Effective Dates    [address] [phone]  7/1/2019 - None Entered    [facility] [id number]       Subscriber Name Subscriber Birth Date Member ID       NAMITA COOPER N 1975 [id number]                 Emergency Contacts      (Rel.) Home Phone Work Phone Mobile Phone    Grant Cooper (Spouse) -- -- [phone]    " Noel Johnson (Father) [phone] -- [phone]    Marquita Johnson (Mother) -- -- [phone]    Neida Zabala -- -- [phone]               Physician Progress Notes (last 24 hours)      Amado Villarreal MD at 04/12/22 0942              [facility] Medicine Services  INPATIENT PROGRESS NOTE    Length of Stay: 8  Date of Admission: 4/2/2022  Primary Care Physician: David Lara MD    Subjective   Chief Complaint: Failure to thrive/metabolic acidosis    HPI   Neurology has signed off.  Nausea vomit resolved.  Blood pressure stable.  Afebrile.  Metabolic acidosis also resolved.    Review of Systems   Unable to obtain due to somnolence.  Patient no acute distress.    All pertinent negatives and positives are as above. All other systems have been reviewed and are negative unless otherwise stated.     Objective    Temp:  [96 °F (35.6 °C)-98.2 °F (36.8 °C)] 98 °F (36.7 °C)  Heart Rate:  [82-94] 86  Resp:  [16-20] 16  BP: (102-143)/(68-87) 143/82    Intake/Output Summary (Last 24 hours) at 4/12/2022 0943  Last data filed at 4/11/2022 2100  Gross per 24 hour   Intake 165.51 ml   Output 450 ml   Net -284.49 ml     Physical Exam  Vitals and nursing note reviewed.   Constitutional:       Appearance: She is well-developed.   HENT:      Head: Normocephalic.   Eyes:      Conjunctiva/sclera: Conjunctivae normal.      Pupils: Pupils are equal, round, and reactive to light.   Neck:      Vascular: No JVD.   Cardiovascular:      Rate and Rhythm: Regular rhythm.  Regular rate.     Heart sounds: Normal heart sounds. No murmur heard.    No friction rub. No gallop.   Pulmonary:      Effort: No respiratory distress.      Breath sounds: No wheezing or rales.      Comments: Diminished breath sound bilateral, clear .  Chest:      Chest wall: No tenderness.   Abdominal:      General: Bowel sounds are normal. There is no distension.      Palpations: Abdomen is soft.      Tenderness: There is no  abdominal tenderness. There is no guarding or rebound.   Musculoskeletal:         General: No tenderness or deformity.      Cervical back: Neck supple.   Skin:     General: Skin is warm and dry.      Capillary Refill: Capillary refill takes 2 to 3 seconds.      Findings: No rash.   Neurological:      Cranial Nerves: No cranial nerve deficit.      Motor: Weakness present. No abnormal muscle tone.      Coordination: Coordination abnormal.      Gait: Gait abnormal.      Deep Tendon Reflexes: Reflexes normal.   Psychiatric:       Patient refusing to talk.  Selective who she wants to talk to.    Results Review:  Lab Results (last 24 hours)     Procedure Component Value Units Date/Time    Basic Metabolic Panel [[id number]]  (Normal) Collected: 04/12/22 0622    Specimen: Blood Updated: 04/12/22 0732     Glucose 79 mg/dL      BUN 7 mg/dL      Creatinine 0.71 mg/dL      Sodium 141 mmol/L      Potassium 3.9 mmol/L      Chloride 105 mmol/L      CO2 24.0 mmol/L      Calcium 8.6 mg/dL      BUN/Creatinine Ratio 9.9     Anion Gap 12.0 mmol/L      eGFR 107.7 mL/min/1.73      Comment: National Kidney Foundation and American Society of Nephrology (ASN) Task Force recommended calculation based on the Chronic Kidney Disease Epidemiology Collaboration (CKD-EPI) equation refit without adjustment for race.       Narrative:      GFR Normal >60  Chronic Kidney Disease <60  Kidney Failure <15      Iron Profile [[id number]]  (Abnormal) Collected: 04/12/22 0622    Specimen: Blood Updated: 04/12/22 0732     Iron 62 mcg/dL      Iron Saturation 64 %      Transferrin 65 mg/dL      TIBC 97 mcg/dL     CBC (No Diff) [[id number]]  (Abnormal) Collected: 04/12/22 0622    Specimen: Blood Updated: 04/12/22 0711     WBC 8.15 10*3/mm3      RBC 3.64 10*6/mm3      Hemoglobin 10.1 g/dL      Hematocrit 30.9 %      MCV 84.9 fL      MCH 27.7 pg      MCHC 32.7 g/dL      RDW 15.3 %      RDW-SD 47.4 fl      MPV 9.7 fL      Platelets 287 10*3/mm3     Folate [[id number]]   (Abnormal) Collected: 04/11/22 1746    Specimen: Blood Updated: 04/12/22 0211     Folate 4.00 ng/mL     Narrative:      Results may be falsely increased if patient taking Biotin.      Vitamin B12 [[id number]]  (Abnormal) Collected: 04/11/22 1746    Specimen: Blood Updated: 04/12/22 0211     Vitamin B-12 1,079 pg/mL     Narrative:      Results may be falsely increased if patient taking Biotin.      Ammonia [[id number]]  (Normal) Collected: 04/11/22 1746    Specimen: Blood Updated: 04/11/22 1806     Ammonia 20 umol/L     Magnesium [[id number]]  (Normal) Collected: 04/11/22 1746    Specimen: Blood Updated: 04/11/22 1800     Magnesium 1.8 mg/dL            Cultures:  No results found for: BLOODCX, URINECX, WOUNDCX, MRSACX, RESPCX, STOOLCX    Radiology Data:    Imaging Results (Last 24 Hours)     Procedure Component Value Units Date/Time    CT Head Without Contrast [[id number]] Collected: 04/11/22 1638     Updated: 04/11/22 1642    Narrative:      EXAMINATION: CT HEAD WO CONTRAST-      4/11/2022 4:09 PM CDT     HISTORY: AMS; E87.6-Hypokalemia; N39.0-Urinary tract infection, site not  specified; R31.9-Hematuria, unspecified; R11.2-Nausea with vomiting,  unspecified; E87.2-Acidosis     In order to have a CT radiation dose as low as reasonably achievable  Automated Exposure Control was utilized for adjustment of the mA and/or  KV according to patient size.     DLP in mGycm= 566.     Axial, sagittal, and coronal noncontrast CT imaging of the head.     The visualized paranasal sinuses are clear.     The brain and ventricles have an age appropriate appearance.   There is no hemorrhage or mass-effect.   No acute infarction is seen.     No calvarial abnormality.       Impression:      1. No acute intracranial abnormality is seen.                                         This report was finalized on 04/11/2022 16:39 by Dr. Raúl aPn MD.          Allergies   Allergen Reactions   • Coconut Unknown - High Severity   • Nuts Unknown -  High Severity   • Penicillins    • [place] Other (See Comments)     Causes migraines per pt reports       Scheduled meds:   bisacodyl, 10 mg, Rectal, Daily  FLUoxetine, 20 mg, Oral, Nightly  fluticasone, 2 spray, Each Nare, BID  folic acid, 1 mg, Oral, Daily  metoprolol succinate XL, 50 mg, Oral, Q24H  neomycin-polymyxin-hydrocortisone, 4 drop, Both Ears, Q6H  oxymetazoline, 2 spray, Nasal, BID  pantoprazole, 40 mg, Oral, BID AC  rivaroxaban, 20 mg, Oral, Nightly  senna-docusate sodium, 2 tablet, Oral, Daily  sodium bicarbonate, 650 mg, Oral, BID  sodium chloride, 10 mL, Intravenous, Q12H  SUMAtriptan, 50 mg, Oral, Once        PRN meds:  hydrALAZINE  •  labetalol  •  ondansetron **OR** ondansetron  •  sodium chloride  •  [COMPLETED] Insert peripheral IV **AND** sodium chloride  •  [COMPLETED] Insert peripheral IV **AND** sodium chloride  •  sodium chloride    Assessment/Plan       Intractable nausea and vomiting    Sepsis secondary to UTI (HCC)    Lactic acidosis    Hypokalemia    Essential (primary) hypertension    UTI (urinary tract infection), bacterial    Malfunction of nephrostomy tube (HCC)    Severe malnutrition (CMS/HCC)    Hypophosphatemia      Plan:  HPI.  Patient was admitted on 4/3 by Dr. Ricks.  Had COVID-19 here last year and ended up having an abdominal hematoma and had to go to [place].  Was there for a long time and then an LTAC and then a skilled nursing facility.  Comes back with multidrug-resistant UTI on Invanz.   She presented with complaints of intractable nausea and vomiting.  Her medical problems started last August when she developed COVID-19 pneumonia and had numerous complications thereafter.  She developed an abdominal hematoma and had to go on dialysis secondary to extrinsic compression of her urinary system. She was transferred from here to [facility] and required exploratory laparotomy.  She ended up having bilateral percutaneous nephrostomy tubes and also ended up with  a left ureteral stent.  She states that her main urologist is Dr. Rina Rey.  She states that she stopped producing urine from her percutaneous nephrostomy tube recently and that there are plans for it to be removed.  She states that she had gotten in touch with [place] to see if someone here could do it so she would not have to travel.  When she left [place], she spent considerable time at a skilled nursing facility in [place], [place].  She states that she has only been home a few weeks.  She was admitted to [facility] in [place] on 3/9 for electrolyte abnormalities.     UTI.  Renal function stable yesterday with a creatinine 0.76.  Dr. Ricks began treating a urinary tract infection with ceftriaxone.  She had a multidrug-resistant Klebsiella in October 2021.  I transitioned her to [facility] on 4/3.  Lactate down to 1.7 from 2.8 after fluids.  Urine does appear infected with too numerous to count white blood cells, 4+ bacteria and large leukocyte esterase. The sample was also bloody.  Urine culture has grown 2 distinct pathogens.  There is a multidrug-resistant Proteus mirabilis strain and an ESBL Klebsiella strain.  Invanz should be treating both of these organisms concurrently.  [facility]  We will treat 7-10 days.  Lactic acidosis improved after fluids and antibiotics.  Procalcitonin elevated at 0.31.  Dr. Dyer is familiar with her previous problems as he saw her last year.  I consulted him to evaluate her.  He wants to treat her current infection and then have her follow back at [place].  On Invanz.  Recommendation from urology-  Urology saw her and wants her to go back to [place] to have her percutaneous nephrostomy pulled.     Hypokalemia .    Resolved.     Sludge in gallbladder/dysfunctional gallbladder?.  General surgery consult.  GI consult. No surgery per general surgery.  CT scan abdomen pelvic-persistent mild but improved urothelial thickening  involving the left renal pelvis and proximal left ureter- related to resolving UTI,  internal/external left-sided nephroureterostomy tube appears in satisfactory position, mild left-sided hydronephrosis which is decreased, Small amount gas is noted in the bladder,  No significant bladder wall thickening is identified, Small left pleural effusion- Increased hazy airspace opacities in the lung bases favored to represent diffuse atelectasis,  Based on the volume of gas within the GI tract- mild ileus may be present,  No evidence of bowel obstruction, Small amount fluid within the upper vagina where previously there was a small amount of gas, Hepatic steatosis.  HIDA scan-20% biliary ejection fraction.  Ultrasound abdomen pelvic-Sludge-filled gallbladder with no significant gallbladder wall thickening, pericholecystic fluid or convincing evidence of acute cholecystitis, Mild dilation of the common hepatic duct with no visualized choledocholithiasis, Hepatic steatosis.     Nausea/vomiting.  Nausea vomiting resolved.  DC Reglan.  Protonix.  Bicarb. DC Inapsine as needed.  DC Compazine as needed. DC Phenergan as needed.     Sinus congestion/sinus pain.  Flonase.  Corticosporin eardrop.  ENT consult.  Afrin.  Dr. Nichols saw her and has wanted to do an audiogram, but she has declined to go over to his office the last 2 days.   CTA chest-No evidence of pulmonary embolus, Small LEFT pleural effusion, Bandlike areas of reticulation throughout both lungs likely representing scarring/fibrosis related to prior Covid 19 infection.      Hypertension/tachycardia.  Tachycardia resolved.   Cut back Toprol.     History of pulmonary embolus.  Hold Xarelto possible surgery.    DC Lovenox and put patient back on Xarelto 4/11/2022.     Anxiety/depression/insomnia . Decrease Prozac nightly due to somnolence.   DC Ambien as needed due to somnolence.  DC trazodone due to some.     Headaches/altered mental status.    DC Fioricet as needed due to  somnolence.   Neurology signed off.  CT scan head-No acute intracranial abnormality is seen.  EEG-pending.     Pain.  DC Robaxin as needed due to somnolence. Morphine as needed.    Anemia.  Folate deficiency . p.o. folate.  No sign of acute bleed.  Hemoglobin stable.     Allergic rhinitis.  Flonase.     Nutrition.  Full liquid diet.       Deconditioning.  PT and OT consult.  Last time patient walk was in August of last year after Covid. Patient is mostly bedbound at the nursing home.     Blood culture-contamination with coagulase negative Staphylococcus.  Urine culture shows Proteus and Klebsiella.  Covid-19-negative.  Flu screen A and B-negative.     Discharge Plannin to 6 days.    Family refused nursing home placement.  Family refused LTAC placement.    Electronically signed by Amado Villarreal MD, 22, 9:43 AM CDT.                    Electronically signed by Amado Villarreal MD at 22 0957     Verónica Erickson APRN at 22 0830            Neurology Progress Note      Chief Complaint:  F/u change in mental status.  difficult hearing says family    Subjective     Subjective:  Patient lying in bed with eyes open. No family at bedside. Patient remains nonverbal but did cry out spontaneously twice. She also grimaced with PROM of bilateral UE.     CT head showed no acute process.  EEG preliminary report shows generalized slowing likely relate to metabolic disturbances.    B12- 1079  Folate 4  Mg+ 1.8  Ammonia- 20      Medications:  Current Facility-Administered Medications   Medication Dose Route Frequency Provider Last Rate Last Admin   • bisacodyl (DULCOLAX) suppository 10 mg  10 mg Rectal Daily Amado Villarreal MD   10 mg at 22 0851   • FLUoxetine (PROzac) capsule 20 mg  20 mg Oral Nightly Amado Villarreal MD   20 mg at 22   • fluticasone (FLONASE) 50 MCG/ACT nasal spray 2 spray  2 spray Each Nare BID Frank Ricks MD   2 spray at 22   • hydrALAZINE (APRESOLINE)  injection 10 mg  10 mg Intravenous Q4H PRN Amado Villarreal MD       • labetalol (NORMODYNE,TRANDATE) injection 10 mg  10 mg Intravenous Q4H PRN Amado Villarreal MD   10 mg at 04/10/22 1518   • lactated ringers infusion  75 mL/hr Intravenous Continuous Amado Villarreal MD 75 mL/hr at 04/12/22 0526 75 mL/hr at 04/12/22 0526   • metoprolol succinate XL (TOPROL-XL) 24 hr tablet 50 mg  50 mg Oral Q24H Amado Villarreal MD   50 mg at 04/11/22 0851   • neomycin-polymyxin-hydrocortisone (CORTISPORIN) otic suspension 4 drop  4 drop Both Ears Q6H Fermin Mcclure DO   4 drop at 04/12/22 0526   • ondansetron (ZOFRAN) tablet 4 mg  4 mg Oral Q6H PRN Frank Ricks MD        Or   • ondansetron (ZOFRAN) injection 4 mg  4 mg Intravenous Q6H PRN Frank Ricks MD   4 mg at 04/07/22 0904   • oxymetazoline (AFRIN) nasal spray 2 spray  2 spray Nasal BID Frank Ricks MD   2 spray at 04/11/22 2017   • pantoprazole (PROTONIX) EC tablet 40 mg  40 mg Oral BID AC Amado Villarreal MD   40 mg at 04/12/22 0631   • rivaroxaban (XARELTO) tablet 20 mg  20 mg Oral Nightly Amado Villarreal MD   20 mg at 04/11/22 2016   • sennosides-docusate (PERICOLACE) 8.6-50 MG per tablet 2 tablet  2 tablet Oral Daily Amado Villarreal MD   2 tablet at 04/11/22 0851   • sodium bicarbonate tablet 650 mg  650 mg Oral BID Fermin Mcclure DO   650 mg at 04/11/22 2016   • sodium chloride 0.9 % flush 10 mL  10 mL Intravenous PRN Frank Ricks MD       • sodium chloride 0.9 % flush 10 mL  10 mL Intravenous PRN Frank Ricks MD       • sodium chloride 0.9 % flush 10 mL  10 mL Intravenous PRN Frank Ricks MD       • sodium chloride 0.9 % flush 10 mL  10 mL Intravenous Q12H Frank Ricks MD   10 mL at 04/11/22 2020   • sodium chloride 0.9 % flush 10 mL  10 mL Intravenous PRN Frank Ricks MD   10 mL at 04/10/22 1518   • SUMAtriptan (IMITREX) tablet 50 mg  50 mg Oral Once Frank Ricks MD           Review of Systems:   -A 14 point review  of systems is not able to be completed as patient is nonverbal.         Objective      Vital Signs  Temp:  [96 °F (35.6 °C)-98.2 °F (36.8 °C)] 98 °F (36.7 °C)  Heart Rate:  [82-94] 86  Resp:  [16-20] 16  BP: (102-143)/(68-87) 143/82    Physical Exam:  General Exam:  Patient looks chronically ill  Head:  Normal cephalic, atraumatic  HEENT:  Neck supple  Fundoscopic Exam:  No signs of disc edema  CVS:  Regular rate and rhythm.  No murmurs  Carotid Examination:  No bruits  Lungs:  Clear to auscultation  Abdomen:  Non-tender, Non-distended  Extremities:  No signs of peripheral edema  Skin:  No rashes     Neurologic Exam:     Mental Status:    -awake. Lying in bed with eyes open  -nonverbal.  Patient cries out with repositioning and did yell out twice without auditory/tactile stimuli.  Not following commands.       CN II:  Visual fields full. Pupils equally reactive to light. Blink to threat bilateral. Patient would not look to examiner.   CN III, IV, VI:  Extraocular Muscles full with no signs of nystagmus  CN V:  Facial sensory is symmetric with no asymmetries.  CN VII:  Facial motor symmetric  CN VIII:  Gross hearing intact bilaterally  CN IX:  Palate elevates symmetrically  CN X:  Palate elevates symmetrically  CN XI:  Shoulder shrug symmetric  CN XII:  Tongue is midline on protrusion     Motor: (strength out of 5:  1= minimal movement, 2 = movement in plane of gravity, 3 = movement against gravity, 4 = movement against some resistance, 5 = full strength)     Patient would not follow commands.  There were some purposeful movements of bilateral upper extremites when patient was holding the bed rail on left and patient did lift right arm to scratch her chest.  Some spontaneous movements of toes bilaterally.        DTR:  -Right              Bicep: 2+         Tricep: 2+        Brachioradialis: 2+              Patella: 2+       Ankle: 2+         Neg Babinski  -Left              Bicep: 2+         Tricep: 2+         Brachioradialis: 2+              Patella: 2+       Ankle: 2+         Neg Babinski     Sensory:  -Intact to light touch, pinprick, temperature, pain, and proprioception     Coordination:  -no increase tone or cog wheeling.     Gait  -not tested. Patient bed bound       Results Review:    I reviewed the patient's new clinical results.    Results from last 7 days   Lab Units 04/12/22  0622 04/11/22  0752 04/10/22  0920   WBC 10*3/mm3 8.15 8.88 11.73*   HEMOGLOBIN g/dL 10.1* 9.7* 10.3*   HEMATOCRIT % 30.9* 28.5* 30.7*   PLATELETS 10*3/mm3 287 256 241        Results from last 7 days   Lab Units 04/12/22  0622 04/11/22  0752 04/10/22  0920 04/09/22  0814 04/08/22  0618   SODIUM mmol/L 141 139 139 136 137   POTASSIUM mmol/L 3.9 3.3* 4.8 4.3 3.7   CHLORIDE mmol/L 105 106 108* 106 107   CO2 mmol/L 24.0 23.0 15.0* 17.0* 19.0*   BUN mg/dL 7 7 11 8 7   CREATININE mg/dL 0.71 0.70 0.82 0.78 0.77   CALCIUM mg/dL 8.6 8.3* 9.1 8.9 8.5*   BILIRUBIN mg/dL  --  0.4  --  0.4 0.4   ALK PHOS U/L  --  87  --  99 97   ALT (SGPT) U/L  --  9  --  10 12   AST (SGOT) U/L  --  20  --  18 25   GLUCOSE mg/dL 79 102* 101* 72 82        Lab Results   Component Value Date    PHOS 2.6 04/09/2022    MG 1.8 04/11/2022    PROTIME 17.2 (H) 04/08/2022    INR 1.46 (H) 04/08/2022     No components found for: POCGLUC  No components found for: A1C  Lab Results   Component Value Date    HDL 64 (H) 04/10/2022    LDL 75 04/10/2022     No components found for: B12  Lab Results   Component Value Date    TSH 2.410 04/02/2022     CT Head Without Contrast    Result Date: 4/11/2022  1. No acute intracranial abnormality is seen.              This report was finalized on 04/11/2022 16:39 by Dr. Raúl Pan MD.      Assessment/Plan     Hospital Problem List      Intractable nausea and vomiting    Sepsis secondary to UTI (HCC)    Lactic acidosis    Hypokalemia    Essential (primary) hypertension    UTI (urinary tract infection), bacterial    Malfunction of nephrostomy  tube (HCC)    Severe malnutrition (CMS/HCC)    Hypophosphatemia    Impression:  1. AMS, drowsiness. During EEG, patient did spontaneously open eyes and look at examiner. At this point, I feel patient lack of participation and current state  May be post Covid encephalopathy given her prolonged Covid recovery and extended complications and course with underlying UTI and metabolic disturbances.    2. Hypokalemia.  3. UTI  4. Malfunction of nephrostomy tube.  5. Malnutrition.   6. Anemia. Defer to attending.      Plan:  1. Supportive care and anticipate patient mental status improves as metabolic disturbances improve. Patient has low reserve given her long and complex medical issues since 9/2021 and any metabolic disruptions, sleep disruptions, may exacerbate her condition and alter her mental status.  2. Agree with Palliative Care to establish goals of care.  3. I attempted to contact patient , Grant at [phone] with no answer and then patient mother, Marquita at [phone] with no answer.  4. Folic acid 1 mg daily.  5. Neurology sign off.               BERT Stoll  04/12/22  08:30 CDT    Electronically signed by Verónica Erickson APRN at 04/12/22 5267

## 2022-04-13 LAB
ANION GAP SERPL CALCULATED.3IONS-SCNC: 17 MMOL/L (ref 5–15)
BUN SERPL-MCNC: 8 MG/DL (ref 6–20)
BUN/CREAT SERPL: 11.4 (ref 7–25)
CALCIUM SPEC-SCNC: 8.9 MG/DL (ref 8.6–10.5)
CHLORIDE SERPL-SCNC: 99 MMOL/L (ref 98–107)
CO2 SERPL-SCNC: 18 MMOL/L (ref 22–29)
CREAT SERPL-MCNC: 0.7 MG/DL (ref 0.57–1)
DEPRECATED RDW RBC AUTO: 45.1 FL (ref 37–54)
EGFRCR SERPLBLD CKD-EPI 2021: 109.5 ML/MIN/1.73
ERYTHROCYTE [DISTWIDTH] IN BLOOD BY AUTOMATED COUNT: 15.1 % (ref 12.3–15.4)
GLUCOSE SERPL-MCNC: 83 MG/DL (ref 65–99)
HCT VFR BLD AUTO: 32.7 % (ref 34–46.6)
HGB BLD-MCNC: 11.1 G/DL (ref 12–15.9)
MCH RBC QN AUTO: 28 PG (ref 26.6–33)
MCHC RBC AUTO-ENTMCNC: 33.9 G/DL (ref 31.5–35.7)
MCV RBC AUTO: 82.4 FL (ref 79–97)
PLATELET # BLD AUTO: 373 10*3/MM3 (ref 140–450)
PMV BLD AUTO: 9.8 FL (ref 6–12)
POTASSIUM SERPL-SCNC: 3.8 MMOL/L (ref 3.5–5.2)
RBC # BLD AUTO: 3.97 10*6/MM3 (ref 3.77–5.28)
SODIUM SERPL-SCNC: 134 MMOL/L (ref 136–145)
WBC NRBC COR # BLD: 19.48 10*3/MM3 (ref 3.4–10.8)

## 2022-04-13 PROCEDURE — 80048 BASIC METABOLIC PNL TOTAL CA: CPT | Performed by: FAMILY MEDICINE

## 2022-04-13 PROCEDURE — 99232 SBSQ HOSP IP/OBS MODERATE 35: CPT

## 2022-04-13 PROCEDURE — 85027 COMPLETE CBC AUTOMATED: CPT | Performed by: FAMILY MEDICINE

## 2022-04-13 RX ORDER — SODIUM BICARBONATE 650 MG/1
650 TABLET ORAL 2 TIMES DAILY
Status: DISCONTINUED | OUTPATIENT
Start: 2022-04-13 | End: 2022-04-20

## 2022-04-13 RX ORDER — SODIUM BICARBONATE 650 MG/1
650 TABLET ORAL 3 TIMES DAILY
Status: DISCONTINUED | OUTPATIENT
Start: 2022-04-13 | End: 2022-04-13

## 2022-04-13 RX ADMIN — Medication 2 SPRAY: at 20:51

## 2022-04-13 RX ADMIN — SODIUM CHLORIDE, POTASSIUM CHLORIDE, SODIUM LACTATE AND CALCIUM CHLORIDE 75 ML/HR: 600; 310; 30; 20 INJECTION, SOLUTION INTRAVENOUS at 08:07

## 2022-04-13 RX ADMIN — NEOMYCIN SULFATE, POLYMYXIN B SULFATE AND HYDROCORTISONE 4 DROP: 10; 3.5; 1 SUSPENSION/ DROPS AURICULAR (OTIC) at 00:21

## 2022-04-13 RX ADMIN — NEOMYCIN SULFATE, POLYMYXIN B SULFATE AND HYDROCORTISONE 4 DROP: 10; 3.5; 1 SUSPENSION/ DROPS AURICULAR (OTIC) at 11:42

## 2022-04-13 RX ADMIN — NEOMYCIN SULFATE, POLYMYXIN B SULFATE AND HYDROCORTISONE 4 DROP: 10; 3.5; 1 SUSPENSION/ DROPS AURICULAR (OTIC) at 05:54

## 2022-04-13 RX ADMIN — BISACODYL 10 MG: 10 SUPPOSITORY RECTAL at 09:12

## 2022-04-13 RX ADMIN — FLUTICASONE PROPIONATE 2 SPRAY: 50 SPRAY, METERED NASAL at 09:12

## 2022-04-13 RX ADMIN — NEOMYCIN SULFATE, POLYMYXIN B SULFATE AND HYDROCORTISONE 4 DROP: 10; 3.5; 1 SUSPENSION/ DROPS AURICULAR (OTIC) at 17:39

## 2022-04-13 RX ADMIN — Medication 2 SPRAY: at 09:16

## 2022-04-13 RX ADMIN — FLUTICASONE PROPIONATE 2 SPRAY: 50 SPRAY, METERED NASAL at 20:51

## 2022-04-13 RX ADMIN — SODIUM BICARBONATE 100 MEQ: 84 INJECTION, SOLUTION INTRAVENOUS at 09:55

## 2022-04-13 NOTE — PLAN OF CARE
Goal Outcome Evaluation:  Plan of Care Reviewed With: patient, spouse        Progress: no change  Outcome Evaluation: VSS. No change in neuro status this shift. Pt at beginning of shift only responding to pain, at end of shift, responding to care. Pt did not take all of hs meds, biting down spoon. At end of shift opening eyes during care and moving upper extremities some. Continue to not follow any commands. Turn/reposition every 2 hours. andrews care prn. Safety maintained.

## 2022-04-13 NOTE — CASE MANAGEMENT/SOCIAL WORK
Charlotte from [facility] saying they would not be able to offer a bed to patient as his ins. Is out of network with facility. Will follow.     Anjelica from [facility] has declined pt due to clinical reasons. Will follow.     [facility] called back and not going to offer pt a bed there. Will inform Milly SCOTT as she spoke with family and none of the facilities they prefer will take patient.

## 2022-04-13 NOTE — CASE MANAGEMENT/SOCIAL WORK
Continued Stay Note   [facility]     Patient Name: Namita Zabala  MRN: [id number]  Today's Date: 4/13/2022    Admit Date: 4/2/2022     Discharge Plan     Row Name 04/13/22 7207       Plan    Plan SNF    Patient/Family in Agreement with Plan yes    Plan Comments Spoke withAlexMarquita Mother   [phone] to inform of no bed offers from facilities. She did agree for pt to be listed at [facility].  She is going to speak with Grant this evening and will touch base tomorrow. Will follow.               Discharge Codes    No documentation.                     ORAL Reza

## 2022-04-13 NOTE — PROGRESS NOTES
[facility] Medicine Services  INPATIENT PROGRESS NOTE    Length of Stay: 9  Date of Admission: 4/2/2022  Primary Care Physician: David Lara MD    Subjective   Chief Complaint: Failure to thrive/metabolic acidosis    HPI   Patient is more alert today and awake.  Blood pressure stable.  Afebrile.  Heart rate is 90.  Patient still refusing to talk, seems selective to me.    Review of Systems   Patient is arousable, refusing to talk.  No sign of acute distress.    All pertinent negatives and positives are as above. All other systems have been reviewed and are negative unless otherwise stated.     Objective    Temp:  [97 °F (36.1 °C)-98.4 °F (36.9 °C)] 98.4 °F (36.9 °C)  Heart Rate:  [79-94] 94  Resp:  [16-18] 16  BP: (136-150)/(70-91) 141/91    Intake/Output Summary (Last 24 hours) at 4/13/2022 0917  Last data filed at 4/13/2022 0452  Gross per 24 hour   Intake 2084.65 ml   Output 1800 ml   Net 284.65 ml     Physical Exam  Constitutional:       Appearance: She is well-developed.   HENT:      Head: Normocephalic.   Eyes:      Conjunctiva/sclera: Conjunctivae normal.      Pupils: Pupils are equal, round, and reactive to light.   Neck:      Vascular: No JVD.   Cardiovascular:      Rate and Rhythm: Regular rhythm.  Regular rate.     Heart sounds: Normal heart sounds. No murmur heard.    No friction rub. No gallop.   Pulmonary:      Effort: No respiratory distress.      Breath sounds: No wheezing or rales.      Comments: Diminished breath sound bilateral, clear .  Chest:      Chest wall: No tenderness.   Abdominal:      General: Bowel sounds are normal. There is no distension.      Palpations: Abdomen is soft.      Tenderness: There is no abdominal tenderness. There is no guarding or rebound.   Musculoskeletal:         General: No tenderness or deformity.      Cervical back: Neck supple.   Skin:     General: Skin is warm and dry.      Capillary Refill: Capillary refill takes 2  to 3 seconds.      Findings: No rash.   Neurological:      Cranial Nerves: No cranial nerve deficit.      Motor: Weakness present. No abnormal muscle tone.      Coordination: Coordination abnormal.      Gait: Gait abnormal.      Deep Tendon Reflexes: Reflexes normal.   Psychiatric:        Results Review:  Lab Results (last 24 hours)     Procedure Component Value Units Date/Time    Basic Metabolic Panel [[id number]]  (Abnormal) Collected: 04/13/22 0725    Specimen: Blood Updated: 04/13/22 0752     Glucose 83 mg/dL      BUN 8 mg/dL      Creatinine 0.70 mg/dL      Sodium 134 mmol/L      Potassium 3.8 mmol/L      Comment: Slight hemolysis detected by analyzer. Results may be affected.        Chloride 99 mmol/L      CO2 18.0 mmol/L      Calcium 8.9 mg/dL      BUN/Creatinine Ratio 11.4     Anion Gap 17.0 mmol/L      eGFR 109.5 mL/min/1.73      Comment: National Kidney Foundation and American Society of Nephrology (ASN) Task Force recommended calculation based on the Chronic Kidney Disease Epidemiology Collaboration (CKD-EPI) equation refit without adjustment for race.       Narrative:      GFR Normal >60  Chronic Kidney Disease <60  Kidney Failure <15      CBC (No Diff) [[id number]] Updated: 04/13/22 0735    Specimen: Blood            Cultures:  No results found for: BLOODCX, URINECX, WOUNDCX, MRSACX, RESPCX, STOOLCX    Radiology Data:    Imaging Results (Last 24 Hours)     ** No results found for the last 24 hours. **          Allergies   Allergen Reactions   • Coconut Unknown - High Severity   • Nuts Unknown - High Severity   • Penicillins    • [place] Other (See Comments)     Causes migraines per pt reports       Scheduled meds:   bisacodyl, 10 mg, Rectal, Daily  FLUoxetine, 20 mg, Oral, Nightly  fluticasone, 2 spray, Each Nare, BID  folic acid, 1 mg, Oral, Daily  metoprolol succinate XL, 50 mg, Oral, Q24H  neomycin-polymyxin-hydrocortisone, 4 drop, Both Ears, Q6H  oxymetazoline, 2 spray, Nasal, BID  pantoprazole, 40 mg,  Oral, BID AC  rivaroxaban, 20 mg, Oral, Nightly  senna-docusate sodium, 2 tablet, Oral, Daily  sodium bicarbonate, 650 mg, Oral, BID  sodium chloride, 10 mL, Intravenous, Q12H  SUMAtriptan, 50 mg, Oral, Once        PRN meds:  hydrALAZINE  •  labetalol  •  ondansetron **OR** ondansetron  •  sodium chloride  •  [COMPLETED] Insert peripheral IV **AND** sodium chloride  •  [COMPLETED] Insert peripheral IV **AND** sodium chloride  •  sodium chloride    Assessment/Plan       Intractable nausea and vomiting    Sepsis secondary to UTI (HCC)    Lactic acidosis    Hypokalemia    Essential (primary) hypertension    UTI (urinary tract infection), bacterial    Malfunction of nephrostomy tube (HCC)    Severe malnutrition (CMS/HCC)    Hypophosphatemia      Plan:  HPI.  Patient was admitted on 4/3 by Dr. Ricks.  Had COVID-19 here last year and ended up having an abdominal hematoma and had to go to [facility].  Was there for a long time and then an LTAC and then a skilled nursing facility.  Comes back with multidrug-resistant UTI on Invanz.   She presented with complaints of intractable nausea and vomiting.  Her medical problems started last August when she developed COVID-19 pneumonia and had numerous complications thereafter.  She developed an abdominal hematoma and had to go on dialysis secondary to extrinsic compression of her urinary system. She was transferred from here to [facility] and required exploratory laparotomy.  She ended up having bilateral percutaneous nephrostomy tubes and also ended up with a left ureteral stent.  She states that her main urologist is Dr. Rina Rey.  She states that she stopped producing urine from her percutaneous nephrostomy tube recently and that there are plans for it to be removed.  She states that she had gotten in touch with [facility] to see if someone here could do it so she would not have to travel.  When she left [facility], she spent considerable time at a skilled  nursing facility in [place], [place].  She states that she has only been home a few weeks.  She was admitted to [facility] in [place] on 3/9 for electrolyte abnormalities.     UTI.  Renal function stable yesterday with a creatinine 0.76.  Dr. Ricks began treating a urinary tract infection with ceftriaxone.  She had a multidrug-resistant Klebsiella in October 2021.  I transitioned her to [facility] on 4/3.  Lactate down to 1.7 from 2.8 after fluids.  Urine does appear infected with too numerous to count white blood cells, 4+ bacteria and large leukocyte esterase. The sample was also bloody.  Urine culture has grown 2 distinct pathogens.  There is a multidrug-resistant Proteus mirabilis strain and an ESBL Klebsiella strain.  Invanz should be treating both of these organisms concurrently.  Today is day 5 of Invanz.  We will treat 7-10 days.  Lactic acidosis improved after fluids and antibiotics.  Procalcitonin elevated at 0.31.  Dr. Dyer is familiar with her previous problems as he saw her last year.  I consulted him to evaluate her.  He wants to treat her current infection and then have her follow back at [place].  On Invanz.  Recommendation from urology-  Urology saw her and wants her to go back to [place] to have her percutaneous nephrostomy pulled.     Hypokalemia .    Resolved.  Magnesium-normal.     Sludge in gallbladder/dysfunctional gallbladder?.  General surgery consult.  GI consult. No surgery per general surgery.  CT scan abdomen pelvic-persistent mild but improved urothelial thickening involving the left renal pelvis and proximal left ureter- related to resolving UTI,  internal/external left-sided nephroureterostomy tube appears in satisfactory position, mild left-sided hydronephrosis which is decreased, Small amount gas is noted in the bladder,  No significant bladder wall thickening is identified, Small left pleural effusion- Increased hazy airspace opacities in the lung bases  favored to represent diffuse atelectasis,  Based on the volume of gas within the GI tract- mild ileus may be present,  No evidence of bowel obstruction, Small amount fluid within the upper vagina where previously there was a small amount of gas, Hepatic steatosis.  HIDA scan-20% biliary ejection fraction.  Ultrasound abdomen pelvic-Sludge-filled gallbladder with no significant gallbladder wall thickening, pericholecystic fluid or convincing evidence of acute cholecystitis, Mild dilation of the common hepatic duct with no visualized choledocholithiasis, Hepatic steatosis.    Metabolic acidosis.  Bicarb drip.  P.o. bicarb.     Nausea/vomiting.  Nausea vomiting resolved.  DC Reglan.  Protonix.  Bicarb. DC Inapsine as needed.  DC Compazine as needed. DC Phenergan as needed.     Sinus congestion/sinus pain.  Flonase.  Corticosporin eardrop.  ENT consult.  [place].  Dr. Nichols saw her and has wanted to do an audiogram, but she has declined to go over to his office the last 2 days.   CTA chest-No evidence of pulmonary embolus, Small LEFT pleural effusion, Bandlike areas of reticulation throughout both lungs likely representing scarring/fibrosis related to prior Covid 19 infection.      Hypertension/tachycardia.  Tachycardia resolved.   Cut back Toprol.     History of pulmonary embolus.  Hold Xarelto possible surgery.    DC Lovenox and put patient back on Xarelto 4/11/2022.     Anxiety/depression/insomnia . Decrease Prozac nightly due to somnolence.   DC Ambien as needed due to somnolence.  DC trazodone due to some.     Headaches/altered mental status.    DC Fioricet as needed due to somnolence.   Neurology signed off.  CT scan head-No acute intracranial abnormality is seen.  EEG-pending.     Pain.  DC Robaxin as needed due to somnolence. Morphine as needed.     Anemia.  Folate deficiency . p.o. folate.  No sign of acute bleed.  Hemoglobin stable.     Allergic rhinitis.  Flonase.     Nutrition.  Full liquid diet.        Deconditioning.  PT and OT consult.  Last time patient walk was in August of last year after Covid. Patient is mostly bedbound at the nursing home.     Blood culture-contamination with coagulase negative Staphylococcus.  Urine culture shows Proteus and Klebsiella.  Covid-19-negative.  Flu screen A and B-negative.     Discharge Plannin to 6 days.     agree with rehab placement in [place] or [place].  Family refused LTAC placement.    Electronically signed by Amado Villarreal MD, 22, 9:17 AM CDT.

## 2022-04-13 NOTE — CASE MANAGEMENT/SOCIAL WORK
Continued Stay Note  [facility]     Patient Name: Namita Zabala  MRN: [id number]  Today's Date: 4/13/2022    Admit Date: 4/2/2022     Discharge Plan     Row Name 04/13/22 1025       Plan    Plan CM spoke with patient's mother since spouse was working double today and voicemail full. Informed that referrals could be sent to [facility]/ [facility] / and [facility] SNF. Pending bed offers. Precert will be needed. Mother is communicating with spouse of patient.               Discharge Codes    No documentation.                     Mi Hardy RN

## 2022-04-13 NOTE — DISCHARGE PLACEMENT REQUEST
"    BELINDA CLAY [phone]  Namita Cooper N (46 y.o. Female)             Date of Birth   1975    Social Security Number       Address   [address] [place], [place] [address]    Home Phone   [phone]    MRN   [id number]       [facility]    Marital Status                               Admission Date   4/2/22    Admission Type   Emergency    Admitting Provider   Amado Villarreal MD    Attending Provider   Amado Villarreal MD    Department, Room/Bed   [facility] 3A, 346/1       Discharge Date       Discharge Disposition       Discharge Destination                               Attending Provider: Amado Villarreal MD    Allergies: Coconut, Nuts, Penicillins, Turkey    Isolation: Contact   Infection: COVID (History) (09/15/21), ESBL Klebsiella (04/07/22)   Code Status: CPR   Advance Care Planning Activity    Ht: 170.2 cm (67\")   Wt: 68 kg (150 lb)    Admission Cmt: None   Principal Problem: Intractable nausea and vomiting [R11.2]                 Active Insurance as of 4/2/2022     Primary Coverage     Payor Plan Insurance Group Employer/Plan Group    ANTHEM BLUE CROSS ANTHEM BLUE CROSS BLUE SHIELD PPO [id number]     Payor Plan Address Payor Plan Phone Number Payor Plan Fax Number Effective Dates    [address] [phone]  1/1/2022 - None Entered    [facility] [address]       Subscriber Name Subscriber Birth Date Member ID       GRANT COOPER D 1975 [id number]           Secondary Coverage     Payor Plan Insurance Group Employer/Plan Group    MEDICARE MEDICARE A & B      Payor Plan Address Payor Plan Phone Number Payor Plan Fax Number Effective Dates    [address] [phone]  7/1/2019 - None Entered    [facility] [address]       Subscriber Name Subscriber Birth Date Member ID       NAMITA COOPER N 1975 [id number]                 Emergency Contacts      (Rel.) Home Phone Work Phone Mobile Phone    Grant Cooper (Spouse) -- -- [phone] "    Noel Johnson (Father) [phone] -- [phone]    Marquita Johnson (Mother) -- -- [phone]    Neida Zabala -- -- [phone]               History & Physical      Frank Ricks MD at 04/03/22 0305                [facility] Medicine Services  HISTORY AND PHYSICAL    Date of Admission: 4/2/2022  Primary Care Physician: David Lara MD    Subjective     Chief Complaint: Nausea and vomiting    History of Present Illness  Patient is a 46-year-old white female past medical history of very complicated course recently.  She had COVID-19 in August of last year complicated by pulmonary emboli on anticoagulation and an abdominal hematoma that formed.  This caused obstruction of her ureters leading to renal failure requiring dialysis temporarily.  She also has nephrostomy tubes 1 which is already been removed.  She had been in a nursing home and LTAC up until about 3 weeks ago.  After she returned home she started having problems with vomiting every single time she eats food or drinks liquids.  She had been hospitalized in the local hospital where her potassium was found to be low was replaced but she says nothing was done about her intractable vomiting.  She presents here today with similar problems.  She states that her bowels have been moving normally she denies fevers or chills she denies abdominal pain she has lost about 50 pounds.  It appears on her medication list she is on several antiemetics and Reglan liquid.  She denies diarrhea she was evaluated today has evidence of urinary tract infection.  Her nephrostomy tube no longer produces urine.  CT of the abdomen and pelvis shows left-sided internal/external nephroureteral stent, hydronephrosis with urothelial thickening and bladder mucosal enhancement possibly reactive versus infectious.  No evidence of perinephric fluid collection no obstructing stones remainder of organs are unremarkable and no abnormality of  GI tract.  She does have evidence of UTI and her potassium today is 2.6.  Her LFTs show an elevated alk phos otherwise unremarkable.  He does still have her gallbladder.  She denies any hematemesis or coffee-ground emesis.  She has had a history of ulcers.        Review of Systems   14 point review of systems negative except for as per HPI    Otherwise complete ROS reviewed and negative except as mentioned in the HPI.    Past Medical History:   Past Medical History:   Diagnosis Date   • Angina at rest (HCC)    • Migraine     Sees Pain Management for injections.    • MVP (mitral valve prolapse)    • Renal disorder    • Syncope      Past Surgical History:  Past Surgical History:   Procedure Laterality Date   • BREAST BIOPSY Right 2011    benign   • INSERTION HEMODIALYSIS CATHETER Left 9/16/2021    Procedure: HEMODIALYSIS CATHETER INSERTION;  Surgeon: Anders Kaur MD;  Location: [place] [address];  Service: Vascular;  Laterality: Left;   • TONSILLECTOMY       Social History:  reports that she has never smoked. She has never used smokeless tobacco. She reports that she does not drink alcohol and does not use drugs.    Family History: family history includes Colon cancer (age of onset: 52) in her maternal aunt; Fibromyalgia in her mother; Heart disease in her mother; Hodgkin's lymphoma in her paternal grandmother; Hypertension in her mother.       Allergies:  Allergies   Allergen Reactions   • Coconut Unknown - High Severity   • Nuts Unknown - High Severity   • Penicillins    • [place] Other (See Comments)     Causes migraines per pt reports       Medications:  Prior to Admission medications    Medication Sig Start Date End Date Taking? Authorizing Provider   butalbital-acetaminophen-caffeine (FIORICET, ESGIC) -40 MG per tablet Take 2 tablets by mouth Every 4 (Four) Hours As Needed for Headache or Migraine.   Yes Provider, MD Odalys   eszopiclone (LUNESTA) 3 MG tablet Take 3 mg by mouth Every Night.  Take immediately before bedtime   Yes Odalys Mullen MD   FLUoxetine (PROzac) 20 MG capsule Take 40 mg by mouth Daily.   Yes Odalys Mullen MD   methocarbamol (ROBAXIN) 500 MG tablet Take 500 mg by mouth 2 (Two) Times a Day As Needed for Muscle Spasms.   Yes Odalys Mullen MD   metoclopramide (REGLAN) 5 MG/5ML solution Take 5 mg by mouth 3 (Three) Times a Day Before Meals.   Yes Odalys Mullen MD   ondansetron ODT (ZOFRAN-ODT) 4 MG disintegrating tablet Place 4 mg on the tongue 4 (Four) Times a Day As Needed for Nausea or Vomiting.   Yes Odalys Mullen MD   oxymetazoline (AFRIN) 0.05 % nasal spray 2 sprays into the nostril(s) as directed by provider 2 (Two) Times a Day.   Yes Odalys Mullen MD   pantoprazole (PROTONIX) 40 MG EC tablet Take 1 tablet by mouth Every Morning. 9/25/21  Yes Darrell De La Torre DO   prochlorperazine (COMPAZINE) 10 MG tablet Take 10 mg by mouth Every 8 (Eight) Hours As Needed for Nausea or Vomiting.   Yes Odalys Mullen MD   promethazine (PHENERGAN) 25 MG tablet Take 25 mg by mouth Every 6 (Six) Hours As Needed for Nausea or Vomiting.   Yes Odalys Mullen MD   rivaroxaban (XARELTO) 20 MG tablet Take 20 mg by mouth Every Night.   Yes Odalys Mullen MD   rizatriptan (MAXALT) 10 MG tablet Take 10 mg by mouth 1 (One) Time As Needed for Migraine. May repeat in 2 hours if needed   Yes Odalys Mullen MD   traZODone (DESYREL) 100 MG tablet Take 100 mg by mouth Every Night.   Yes Odalys Mullen MD   acetaminophen (TYLENOL) 325 MG tablet Take 2 tablets by mouth Every 6 (Six) Hours As Needed for Mild Pain , Fever or Headache (fever greater than 101.5 F or headache). 9/24/21   Darrell De La Torre DO   bisacodyl (DULCOLAX) 10 MG suppository Insert 1 suppository into the rectum Daily. 9/25/21   Darrell De La Torre DO   cloNIDine (CATAPRES-TTS) 0.1 MG/24HR patch Place 1 patch on the skin as directed by provider 1 (One) Time Per Week.  9/26/21   Darrell De La Torre, DO   dextrose (D50W) 50 % solution Infuse 50 mL into a venous catheter Every 15 (Fifteen) Minutes As Needed for Low Blood Sugar (Blood Sugar Less Than 70). 9/24/21   Darrell De La Torre, DO   dextrose (GLUTOSE) 40 % gel Take 15 g by mouth Every 15 (Fifteen) Minutes As Needed for Low Blood Sugar (Blood sugar less than 70). 9/24/21   Darrell De La Torre, DO   epoetin mindy-epbx (RETACRIT) 97360 UNIT/ML injection Inject 1 mL under the skin into the appropriate area as directed 3 (Three) Times a Week. Indications: ESRD on Dialysis 9/27/21   Darrell De La Torre, DO   glucagon, human recombinant, (GLUCAGEN DIAGNOSTIC) 1 MG injection Inject 1 mg under the skin into the appropriate area as directed Every 15 (Fifteen) Minutes As Needed (Blood Glucose Less Than 70) for up to 360 days. 9/24/21 9/19/22  Darrell De La Torre, DO   Heparin Sodium, Porcine, (heparin, porcine,) 1000 UNIT/ML injection 3.6 mL by Intracatheter route As Needed (after dialysis). Indications: Other - full anticoagulation 9/24/21   Darrell De La Torre DO   insulin regular (humuLIN R,novoLIN R) 100 UNIT/ML injection Inject 0-9 Units under the skin into the appropriate area as directed Every 6 (Six) Hours. 9/24/21   Darrell De La Torre, DO   lidocaine (LIDODERM) 5 % Place 1 patch on the skin as directed by provider Daily. Remove & Discard patch within 12 hours or as directed by MD 9/25/21   Darrell De La Torre, DO   lidocaine (LIDODERM) 5 % Place 1 patch on the skin as directed by provider Daily. Remove & Discard patch within 12 hours or as directed by MD 9/25/21   Darrell De La Torre, DO   metoprolol tartrate (LOPRESSOR) 25 MG tablet Take 1 tablet by mouth Every 12 (Twelve) Hours. 9/24/21   Darrell De La Torre, DO   ondansetron (ZOFRAN) 4 MG/2ML injection Infuse 2 mL into a venous catheter Every 6 (Six) Hours As Needed for Nausea or Vomiting. 9/24/21   Darrell De La Torre, DO   sennosides-docusate (PERICOLACE) 8.6-50 MG per tablet Take 1 tablet by mouth 2  "(Two) Times a Day. 9/24/21   Darrell De La Torre,      I have utilized all available immediate resources to obtain, update, and review the patient's current medications.    Objective     Vital Signs: /91 (BP Location: Right arm, Patient Position: Lying)   Pulse 107   Temp 97.8 °F (36.6 °C) (Oral)   Resp 16   Ht 170.2 cm (67\")   Wt 63.4 kg (139 lb 11.2 oz)   SpO2 100%   BMI 21.88 kg/m²   Physical Exam   Gen.:  Well-nourished well-developed white female in no acute distress  HEENT: Atraumatic, normocephalic.  Pupils equal, round, and reactive to light. Extraocular movements intact.  Sclerae anicteric.  External ears negative.  Mucous membranes moist.  Neck is supple without lymphadenopathy.  No JVD is noted.  No carotid bruits are auscultated.  Oropharynx is without erythema or exudate.   Chest: Clear to auscultation and percussion.  CV: Regular rate and rhythm.  Normal S1-S2.  No gallops, murmurs. or rubs.  Abdomen: Soft, periumbilical tenderness, nondistended.  Active bowel sounds,  No hepatosplenomegaly.  No masses.  No hernias.  Midline incision almost fully healed by secondary intent  Extremities: No clubbing, edema, or cyanosis.  Capillary refill is normal.  Pulses are 2+ and symmetric at radial and dorsalis pedis.  Posterior tibial pulses are intact and 2+ palpable.  Neuro: Patient is awake, alert, and oriented ×3.  Cranial nerves II through XII are grossly intact.  Motor is 5 out of 5 bilaterally.  DTRs are 2+ and symmetric bilaterally. Sensory exam is nonfocal  Skin: Warm, dry, and intact.  No evidence of breakdown.  Midline incision  Sensorium: Normal thought and affect    Nursing notes and vital signs reviewed        Results Reviewed:  Lab Results (last 24 hours)     Procedure Component Value Units Date/Time    Lipase [[id number]]  (Normal) Collected: 04/02/22 2113    Specimen: Blood Updated: 04/03/22 0423     Lipase 32 U/L     Hemoglobin A1c [[id number]] Collected: 04/02/22 2113    Specimen: " Blood Updated: 04/03/22 0416    STAT Lactic Acid, Reflex [[id number]]  (Normal) Collected: 04/03/22 0152    Specimen: Blood Updated: 04/03/22 0229     Lactate 1.7 mmol/L     COVID-19 and FLU A/B PCR - Swab, Nasopharynx [[id number]]  (Normal) Collected: 04/02/22 2154    Specimen: Swab from Nasopharynx Updated: 04/02/22 2312     COVID19 Not Detected     Influenza A PCR Not Detected     Influenza B PCR Not Detected    Narrative:      Fact sheet for providers: https://www.fda.gov/media/270515/download    Fact sheet for patients: https://www.fda.gov/media/702688/download    Test performed by PCR.    Lactic Acid, Plasma [[id number]]  (Abnormal) Collected: 04/02/22 2235    Specimen: Blood Updated: 04/02/22 2306     Lactate 2.8 mmol/L     Urinalysis With Culture If Indicated - Urine, Catheter [[id number]]  (Abnormal) Collected: 04/02/22 2221    Specimen: Urine, Catheter Updated: 04/02/22 2258     Color, UA Locke     Appearance, UA Turbid     pH, UA 6.5     Specific Gravity, UA 1.016     Glucose, UA Negative     Ketones, UA Trace     Bilirubin, UA Small (1+)     Blood, UA Large (3+)     Protein, UA >=300 mg/dL (3+)     Leuk Esterase, UA Large (3+)     Nitrite, UA Negative     Urobilinogen, UA 1.0 E.U./dL    Narrative:      Dipstick results may be inaccurate due to color interference.    Urinalysis, Microscopic Only - Urine, Catheter [[id number]]  (Abnormal) Collected: 04/02/22 2221    Specimen: Urine, Catheter Updated: 04/02/22 2258     RBC, UA Too Numerous to Count /HPF      WBC, UA Too Numerous to Count /HPF      Bacteria, UA 4+ /HPF      Squamous Epithelial Cells, UA       Unable to determine due to loaded field     /HPF     Methodology Manual Light Microscopy    Urine Culture - Urine, Urine, Catheter [[id number]] Collected: 04/02/22 2221    Specimen: Urine, Catheter Updated: 04/02/22 2258    Blood Culture - Blood, Hand, Left [[id number]] Collected: 04/02/22 2235    Specimen: Blood from Hand, Left Updated: 04/02/22 1545     Blood Culture - Blood, Arm, Right [[id number]] Collected: 04/02/22 2235    Specimen: Blood from Arm, Right Updated: 04/02/22 2250    Onida Draw [[id number]] Collected: 04/02/22 2113    Specimen: Blood Updated: 04/02/22 2217    Narrative:      The following orders were created for panel order [place] Draw.  Procedure                               Abnormality         Status                     ---------                               -----------         ------                     Green Top (Gel)[[id number]]                                  Final result               Lavender Top[[id number]]                                     Final result               Red Top[[id number]]                                          Final result               Light Blue Top[[id number]]                                   Final result                 Please view results for these tests on the individual orders.    Red Top [[id number]] Collected: 04/02/22 2113    Specimen: Blood Updated: 04/02/22 2217     Extra Tube Hold for add-ons.     Comment: Auto resulted.       Light Blue Top [[id number]] Collected: 04/02/22 2113    Specimen: Blood Updated: 04/02/22 2217     Extra Tube hold for add-on     Comment: Auto resulted       Green Top (Gel) [[id number]] Collected: 04/02/22 2113    Specimen: Blood Updated: 04/02/22 2217     Extra Tube Hold for add-ons.     Comment: Auto resulted.       Lavender Top [[id number]] Collected: 04/02/22 2113    Specimen: Blood Updated: 04/02/22 2217     Extra Tube hold for add-on     Comment: Auto resulted       TSH [[id number]]  (Normal) Collected: 04/02/22 2113    Specimen: Blood Updated: 04/02/22 2150     TSH 2.410 uIU/mL     Procalcitonin [[id number]]  (Abnormal) Collected: 04/02/22 2113    Specimen: Blood Updated: 04/02/22 2150     Procalcitonin 0.31 ng/mL     Narrative:      As a Marker for Sepsis (Non-Neonates):    1. <0.5 ng/mL represents a low risk of severe sepsis and/or septic shock.  2. >2 ng/mL represents a high  "risk of severe sepsis and/or septic shock.    As a Marker for Lower Respiratory Tract Infections that require antibiotic therapy:    PCT on Admission    Antibiotic Therapy       6-12 Hrs later    >0.5                Strongly Recommended  >0.25 - <0.5        Recommended   0.1 - 0.25          Discouraged              Remeasure/reassess PCT  <0.1                Strongly Discouraged     Remeasure/reassess PCT    As 28 day mortality risk marker: \"Change in Procalcitonin Result\" (>80% or <=80%) if Day 0 (or Day 1) and Day 4 values are available. Refer to http://www.[facility] [place]-pct-calculator.com    Change in PCT <=80%  A decrease of PCT levels below or equal to 80% defines a positive change in PCT test result representing a higher risk for 28-day all-cause mortality of patients diagnosed with severe sepsis for septic shock.    Change in PCT >80%  A decrease of PCT levels of more than 80% defines a negative change in PCT result representing a lower risk for 28-day all-cause mortality of patients diagnosed with severe sepsis or septic shock.       T4, Free [[id number]]  (Normal) Collected: 04/02/22 2113    Specimen: Blood Updated: 04/02/22 2149     Free T4 1.36 ng/dL     Narrative:      Results may be falsely increased if patient taking Biotin.      Comprehensive Metabolic Panel [[id number]]  (Abnormal) Collected: 04/02/22 2113    Specimen: Blood Updated: 04/02/22 2146     Glucose 97 mg/dL      BUN 13 mg/dL      Creatinine 1.11 mg/dL      Sodium 137 mmol/L      Potassium 2.6 mmol/L      Chloride 90 mmol/L      CO2 27.0 mmol/L      Calcium 10.6 mg/dL      Total Protein 8.0 g/dL      Albumin 3.60 g/dL      ALT (SGPT) 20 U/L      AST (SGOT) 28 U/L      Alkaline Phosphatase 149 U/L      Total Bilirubin 1.2 mg/dL      Globulin 4.4 gm/dL      A/G Ratio 0.8 g/dL      BUN/Creatinine Ratio 11.7     Anion Gap 20.0 mmol/L      eGFR 63.0 mL/min/1.73      Comment: National Kidney Foundation and American Society of Nephrology (ASN) Task Force " recommended calculation based on the Chronic Kidney Disease Epidemiology Collaboration (CKD-EPI) equation refit without adjustment for race.       Narrative:      GFR Normal >60  Chronic Kidney Disease <60  Kidney Failure <15      Magnesium [[id number]]  (Normal) Collected: 04/02/22 2113    Specimen: Blood Updated: 04/02/22 2138     Magnesium 1.7 mg/dL     CBC & Differential [[id number]]  (Abnormal) Collected: 04/02/22 2113    Specimen: Blood Updated: 04/02/22 2124    Narrative:      The following orders were created for panel order CBC & Differential.  Procedure                               Abnormality         Status                     ---------                               -----------         ------                     CBC Auto Differential[[id number]]        Abnormal            Final result                 Please view results for these tests on the individual orders.    CBC Auto Differential [[id number]]  (Abnormal) Collected: 04/02/22 2113    Specimen: Blood Updated: 04/02/22 2124     WBC 9.07 10*3/mm3      RBC 5.13 10*6/mm3      Hemoglobin 14.4 g/dL      Hematocrit 42.3 %      MCV 82.5 fL      MCH 28.1 pg      MCHC 34.0 g/dL      RDW 14.6 %      RDW-SD 44.0 fl      MPV 8.9 fL      Platelets 476 10*3/mm3      Neutrophil % 63.9 %      Lymphocyte % 26.9 %      Monocyte % 6.5 %      Eosinophil % 1.7 %      Basophil % 0.6 %      Immature Grans % 0.4 %      Neutrophils, Absolute 5.80 10*3/mm3      Lymphocytes, Absolute 2.44 10*3/mm3      Monocytes, Absolute 0.59 10*3/mm3      Eosinophils, Absolute 0.15 10*3/mm3      Basophils, Absolute 0.05 10*3/mm3      Immature Grans, Absolute 0.04 10*3/mm3      nRBC 0.0 /100 WBC         Imaging Results (Last 24 Hours)     Procedure Component Value Units Date/Time    CT Abdomen Pelvis With Contrast [[id number]] Resulted: 04/02/22 2212     Updated: 04/02/22 2218    XR Chest 1 View [[id number]] Collected: 04/02/22 2146     Updated: 04/02/22 2151    Narrative:      EXAM: XR CHEST 1 VW-      INDICATION: weakness     COMPARISON: 9/20/2021     FINDINGS:     Cardiac silhouette is normal in size. Chronic mild RIGHT hemidiaphragm  elevation with associated RIGHT basilar atelectasis. No pleural  effusion, pneumothorax, or focal consolidation. No acute osseous  finding.       Impression:         No acute findings. Mild chronic RIGHT hemidiaphragm elevation with  presumed RIGHT basilar atelectasis.   This report was finalized on 04/02/2022 21:48 by Dr. Gomez Cárdenas MD.        I have personally reviewed and interpreted the radiology studies and ECG obtained at time of admission.     Assessment / Plan     Assessment:   Active Hospital Problems    Diagnosis    • **Intractable nausea and vomiting    • Hypokalemia    • UTI (urinary tract infection), bacterial    • Malfunction of nephrostomy tube (HCC)    • Essential (primary) hypertension    1.  Intractable nausea and vomiting plans admit the patient will start on a clear liquid diet if we can advance it we will start her on scheduled IV Reglan and IV PPI.  Consult GI for further recommendations.  We will also check a gallbladder ultrasound.  2.  Hypokalemia replace potassium we will also give her a dose of magnesium sulfate since she is borderline low.  Recheck labs in a.m.  3.  UTI culture blood and urine start IV Rocephin this may be contributing to her nausea and vomiting.  4.  Malfunctioning nephrostomy tube she is inquiring about getting it removed she does have hydronephrosis on that side will ask urology for their opinion.  5.  Hypertension resume home medications monitor BP.  6.  Nasal congestion likely from vomiting we will give her Afrin and Nasonex.  7.  Medications reviewed and resumed as appropriate.    Patient seen after midnight       Code Status/Advanced Care Plan: Full    The patient's surrogate decision maker is the patient's .     I discussed my findings and recommendations with the patient.    Estimated length of stay is 2-3 nights.      The patient was seen and examined by me on April 3, 2022 at 3:05 AM.    Electronically signed by Frank Ricks MD, 04/03/22, 04:25 CDT.                Electronically signed by Frank Ricks MD at 04/03/22 0434         Current Facility-Administered Medications   Medication Dose Route Frequency Provider Last Rate Last Admin   • bisacodyl (DULCOLAX) suppository 10 mg  10 mg Rectal Daily Amado Villarreal MD   10 mg at 04/13/22 0912   • FLUoxetine (PROzac) capsule 20 mg  20 mg Oral Nightly Amado Villarreal MD   20 mg at 04/12/22 2051   • fluticasone (FLONASE) 50 MCG/ACT nasal spray 2 spray  2 spray Each Nare BID Frank Ricks MD   2 spray at 04/13/22 0912   • folic acid (FOLVITE) tablet 1 mg  1 mg Oral Daily Verónica Erickson APRN   1 mg at 04/12/22 1321   • hydrALAZINE (APRESOLINE) injection 10 mg  10 mg Intravenous Q4H PRN Amado Villarreal MD       • labetalol (NORMODYNE,TRANDATE) injection 10 mg  10 mg Intravenous Q4H PRN Amado Villarreal MD   10 mg at 04/10/22 1518   • metoprolol succinate XL (TOPROL-XL) 24 hr tablet 50 mg  50 mg Oral Q24H Amado Villarreal MD   50 mg at 04/12/22 0936   • neomycin-polymyxin-hydrocortisone (CORTISPORIN) otic suspension 4 drop  4 drop Both Ears Q6H Fermin Mcclure DO   4 drop at 04/13/22 0554   • ondansetron (ZOFRAN) tablet 4 mg  4 mg Oral Q6H PRN Frank Ricks MD        Or   • ondansetron (ZOFRAN) injection 4 mg  4 mg Intravenous Q6H PRN Frank Ricks MD   4 mg at 04/07/22 0904   • oxymetazoline (AFRIN) nasal spray 2 spray  2 spray Nasal BID Frank Ricks MD   2 spray at 04/13/22 0916   • pantoprazole (PROTONIX) EC tablet 40 mg  40 mg Oral BID AC Amado Villarreal MD   40 mg at 04/12/22 1831   • rivaroxaban (XARELTO) tablet 20 mg  20 mg Oral Nightly Amado Villarreal MD   20 mg at 04/12/22 2051   • sennosides-docusate (PERICOLACE) 8.6-50 MG per tablet 2 tablet  2 tablet Oral Daily Amado Villarreal MD   2 tablet at 04/12/22 0936   • sodium bicarbonate 8.4 % 100  mEq in dextrose 5 % and sodium chloride 0.45 % 1,000 mL infusion (greater than 75 mEq)  100 mEq Intravenous Continuous Amado Villarreal MD 75 mL/hr at 04/13/22 0955 100 mEq at 04/13/22 0955   • sodium bicarbonate tablet 650 mg  650 mg Oral BID Amado Villarreal MD       • sodium chloride 0.9 % flush 10 mL  10 mL Intravenous PRN Frank Ricks MD       • sodium chloride 0.9 % flush 10 mL  10 mL Intravenous PRN Frank Ricks MD       • sodium chloride 0.9 % flush 10 mL  10 mL Intravenous PRN Frank Ricks MD       • sodium chloride 0.9 % flush 10 mL  10 mL Intravenous Q12H Frank Ricks MD   10 mL at 04/12/22 0937   • sodium chloride 0.9 % flush 10 mL  10 mL Intravenous PRN Frank Ricks MD   10 mL at 04/10/22 1518   • SUMAtriptan (IMITREX) tablet 50 mg  50 mg Oral Once Frank Ricks MD         Operative/Procedure Notes (last 24 hours)  Notes from 04/12/22 1039 through 04/13/22 1039   No notes of this type exist for this encounter.            Physician Progress Notes (last 24 hours)      Nneka Barton APRN at 04/13/22 1000          Palliative Care Daily Progress Note   Chief complaint: goals of care/advanced care planning  Code Status and Medical Interventions:   Ordered at: 04/03/22 0411     Level Of Support Discussed With:    Patient     Code Status (Patient has no pulse and is not breathing):    CPR (Attempt to Resuscitate)     Medical Interventions (Patient has pulse or is breathing):    Full Support     Subjective   Medical record reviewed. Events noted. Attending able to speak with patient's spouse further yesterday afternoon and he was agreeable for rehab placement in [place] or [place].  Morning labs revealed she is slightly hyponatremic with sodium at 134 however other labs remain unremarkable. Appears asleep at time of exam, continues to not arouse, follow commands or answer questions. Does not appear in any distress, no visitors at bedside.       Advance Care Planning      Advance Directive Status: Patient does not have advance directive     Due to the Palliative Care Topics Discussed: palliative care, goals of care, care options and discharge options we will establish an advance care plan.   Goals of care: Ongoing.    The patient receives support from her spouse, parent and extended family.  POA/Healthcare Surrogate - Next of kin is her spouse, Grant.     Review of Systems   Unable to perform ROS: patient nonverbal     Pain Assessment  Nonverbal Indicators of Pain: grimace  CPOT and PAINAD Scales: PAINAD (Pain Assessment in Advance Dementia Scale)  PAINAD Breathin-->normal  PAINAD Negative Vocalization: 1-->occasional moan/groan, low speech, negative/disapproving quality (when turning)  PAINAD Facial Expression: 0-->smiling or inexpressive  PAINAD Body Language: 0-->relaxed  PAINAD Consolability: 0-->no need to console  PAINAD Score: 1  Pain Location: abdomen    Objective   Diagnostics: Reviewed      Intake/Output Summary (Last 24 hours) at 2022 09  Last data filed at 2022 0452  Gross per 24 hour   Intake 2084.65 ml   Output 1800 ml   Net 284.65 ml     Current Facility-Administered Medications   Medication Dose Route Frequency Provider Last Rate Last Admin   • bisacodyl (DULCOLAX) suppository 10 mg  10 mg Rectal Daily Amado Villarreal MD   10 mg at 22   • FLUoxetine (PROzac) capsule 20 mg  20 mg Oral Nightly Amado Villarreal MD   20 mg at 22   • fluticasone (FLONASE) 50 MCG/ACT nasal spray 2 spray  2 spray Each Nare BID Frank Ricks MD   2 spray at 22   • folic acid (FOLVITE) tablet 1 mg  1 mg Oral Daily Verónica Erickson APRN   1 mg at 22 1321   • hydrALAZINE (APRESOLINE) injection 10 mg  10 mg Intravenous Q4H PRN Amado Villarreal MD       • labetalol (NORMODYNE,TRANDATE) injection 10 mg  10 mg Intravenous Q4H PRN Amado Villarreal MD   10 mg at 04/10/22 1518   • metoprolol succinate XL (TOPROL-XL) 24 hr tablet 50 mg  50 mg  "Oral Q24H Amado Villarreal MD   50 mg at 04/12/22 0936   • neomycin-polymyxin-hydrocortisone (CORTISPORIN) otic suspension 4 drop  4 drop Both Ears Q6H Fermin Mcclure DO   4 drop at 04/13/22 0554   • ondansetron (ZOFRAN) tablet 4 mg  4 mg Oral Q6H PRN Frank Ricks MD        Or   • ondansetron (ZOFRAN) injection 4 mg  4 mg Intravenous Q6H PRN Frank Ricks MD   4 mg at 04/07/22 0904   • oxymetazoline (AFRIN) nasal spray 2 spray  2 spray Nasal BID Frank Ricks MD   2 spray at 04/13/22 0916   • pantoprazole (PROTONIX) EC tablet 40 mg  40 mg Oral BID AC Amado Villarreal MD   40 mg at 04/12/22 1831   • rivaroxaban (XARELTO) tablet 20 mg  20 mg Oral Nightly Amado Villarreal MD   20 mg at 04/12/22 2051   • sennosides-docusate (PERICOLACE) 8.6-50 MG per tablet 2 tablet  2 tablet Oral Daily Amado Villarreal MD   2 tablet at 04/12/22 0936   • sodium bicarbonate tablet 650 mg  650 mg Oral BID Amado Villarreal MD       • sodium chloride 0.9 % flush 10 mL  10 mL Intravenous PRN Frank Ricks MD       • sodium chloride 0.9 % flush 10 mL  10 mL Intravenous PRN Frank Ricks MD       • sodium chloride 0.9 % flush 10 mL  10 mL Intravenous PRN Frank Ricks MD       • sodium chloride 0.9 % flush 10 mL  10 mL Intravenous Q12H Frank Ricks MD   10 mL at 04/12/22 0937   • sodium chloride 0.9 % flush 10 mL  10 mL Intravenous PRN Frank Ricks MD   10 mL at 04/10/22 1518   • SUMAtriptan (IMITREX) tablet 50 mg  50 mg Oral Once Frank Ricks MD            hydrALAZINE  •  labetalol  •  ondansetron **OR** ondansetron  •  sodium chloride  •  [COMPLETED] Insert peripheral IV **AND** sodium chloride  •  [COMPLETED] Insert peripheral IV **AND** sodium chloride  •  sodium chloride    Assessment:  Vital Signs: /91 (BP Location: Left arm, Patient Position: Lying)   Pulse 94   Temp 98.4 °F (36.9 °C) (Oral)   Resp 16   Ht 170.2 cm (67\")   Wt 68 kg (150 lb)   SpO2 100%   BMI 23.49 kg/m² "     Physical Exam  Vitals and nursing note reviewed.   Constitutional:       General: She is sleeping. She is not in acute distress.     Appearance: She is ill-appearing.   HENT:      Head: Normocephalic and atraumatic.   Eyes:      General: Lids are normal.   Cardiovascular:      Rate and Rhythm: Tachycardia present.      Heart sounds: Normal heart sounds.   Pulmonary:      Effort: Pulmonary effort is normal.      Breath sounds: Decreased breath sounds present.   Abdominal:      General: Bowel sounds are normal. There is no distension.      Palpations: Abdomen is soft.      Comments: Mepilex to midline abdominal incision, CDI.    Genitourinary:     Comments: Left sided nephrostomy.  Musculoskeletal:      Right foot: Foot drop present.      Left foot: Foot drop present.      Comments: Multipodus boots present to BLE    Skin:     General: Skin is warm and dry.      Coloration: Skin is pale.   Neurological:      Comments: CARLI   Psychiatric:         Speech: She is noncommunicative.         Behavior: Behavior is uncooperative.         Cognition and Memory: Cognition is impaired.      Comments: CARLI     Patient status: Disease state: Controlled with current treatments.  Functional status: Palliative Performance Scale Score: Performance 10% based on the following measures: Ambulation: Totally bed bound, Activity and Evidence of Disease: Unable to do any work, extensive evidence of disease, Self-Care: Total care required,  Intake: Mouth care only, LOC: Drowsy or comatose. Nutritional status: Albumin 2.30.Body mass index is 23.49 kg/m².    Impression/Problem List:  1.    Altered mental status  2.    Impaired mobility and ability to perform activities of daily living  3.    Severe malnutrition  4.    Urinary tract infection  5.    Sepsis secondary to UTI  6.    Hypertension  7.    Malfunctioning nephrostomy tube  8.    Lactic acidosis  9.    Impaired functioning of gallbladder (sludge-filled and 20% EF per imaging)  10.   Hepatic steatosis  11.  Anemia  12.  Intractable nausea and vomiting  13.  Pleural effusion, left  14.  Folate deficiency  15.  Anxiety  16.  Hyponatremia  17.  History of COVID-19 (August 2021)     Plans/Recommendations     1. Goals of care include CODE STATUS CPR with full support interventions.    2. Palliative care encounter  - Prognosis is guarded long-term secondary to poor performance status, altered mental status, severe malnutrition, urinary tract infection, sepsis, hepatic steatosis, anemia, impaired gallbladder functioning, recent complex medical history including renal failure requiring temporary dialysis, nephrostomy placement, bladder rupture and many other comorbidities listed above.   - Discussed with attending, he met with patient's spouse yesterday afternoon and shared spouse is agreeable to discharge to skilled nursing facility. SW/CM following and referrals have been sent.   - Will plan to follow up with patient/spouse if she remains hospitalized on Friday or sooner if needed.      Thank you for allowing us to participate in patient's plan of care. Palliative Care Team will continue to follow patient.     Time spent: 25 minutes spent reviewing medical and medication records, assessing and examining patient, discussing with family, answering questions, providing some guidance about a plan and documentation of care, and coordinating care with other healthcare members, with > 50% time spent face to face.     BERT White  4/13/2022      Electronically signed by Nneka Barton APRN at 04/13/22 1030     Amado Villarreal MD at 04/13/22 0917              [facility] Medicine Services  INPATIENT PROGRESS NOTE    Length of Stay: 9  Date of Admission: 4/2/2022  Primary Care Physician: David Lara MD    Subjective   Chief Complaint: Failure to thrive/metabolic acidosis    HPI   Patient is more alert today and awake.  Blood pressure stable.  Afebrile.   Heart rate is 90.  Patient still refusing to talk, seems selective to me.    Review of Systems   Patient is arousable, refusing to talk.  No sign of acute distress.    All pertinent negatives and positives are as above. All other systems have been reviewed and are negative unless otherwise stated.     Objective    Temp:  [97 °F (36.1 °C)-98.4 °F (36.9 °C)] 98.4 °F (36.9 °C)  Heart Rate:  [79-94] 94  Resp:  [16-18] 16  BP: (136-150)/(70-91) 141/91    Intake/Output Summary (Last 24 hours) at 4/13/2022 0917  Last data filed at 4/13/2022 0452  Gross per 24 hour   Intake 2084.65 ml   Output 1800 ml   Net 284.65 ml     Physical Exam  Constitutional:       Appearance: She is well-developed.   HENT:      Head: Normocephalic.   Eyes:      Conjunctiva/sclera: Conjunctivae normal.      Pupils: Pupils are equal, round, and reactive to light.   Neck:      Vascular: No JVD.   Cardiovascular:      Rate and Rhythm: Regular rhythm.  Regular rate.     Heart sounds: Normal heart sounds. No murmur heard.    No friction rub. No gallop.   Pulmonary:      Effort: No respiratory distress.      Breath sounds: No wheezing or rales.      Comments: Diminished breath sound bilateral, clear .  Chest:      Chest wall: No tenderness.   Abdominal:      General: Bowel sounds are normal. There is no distension.      Palpations: Abdomen is soft.      Tenderness: There is no abdominal tenderness. There is no guarding or rebound.   Musculoskeletal:         General: No tenderness or deformity.      Cervical back: Neck supple.   Skin:     General: Skin is warm and dry.      Capillary Refill: Capillary refill takes 2 to 3 seconds.      Findings: No rash.   Neurological:      Cranial Nerves: No cranial nerve deficit.      Motor: Weakness present. No abnormal muscle tone.      Coordination: Coordination abnormal.      Gait: Gait abnormal.      Deep Tendon Reflexes: Reflexes normal.   Psychiatric:        Results Review:  Lab Results (last 24 hours)      Procedure Component Value Units Date/Time    Basic Metabolic Panel [[id number]]  (Abnormal) Collected: 04/13/22 0725    Specimen: Blood Updated: 04/13/22 0752     Glucose 83 mg/dL      BUN 8 mg/dL      Creatinine 0.70 mg/dL      Sodium 134 mmol/L      Potassium 3.8 mmol/L      Comment: Slight hemolysis detected by analyzer. Results may be affected.        Chloride 99 mmol/L      CO2 18.0 mmol/L      Calcium 8.9 mg/dL      BUN/Creatinine Ratio 11.4     Anion Gap 17.0 mmol/L      eGFR 109.5 mL/min/1.73      Comment: National Kidney Foundation and American Society of Nephrology (ASN) Task Force recommended calculation based on the Chronic Kidney Disease Epidemiology Collaboration (CKD-EPI) equation refit without adjustment for race.       Narrative:      GFR Normal >60  Chronic Kidney Disease <60  Kidney Failure <15      CBC (No Diff) [[id number]] Updated: 04/13/22 0735    Specimen: Blood            Cultures:  No results found for: BLOODCX, URINECX, WOUNDCX, MRSACX, RESPCX, STOOLCX    Radiology Data:    Imaging Results (Last 24 Hours)     ** No results found for the last 24 hours. **          Allergies   Allergen Reactions   • Coconut Unknown - High Severity   • Nuts Unknown - High Severity   • Penicillins    • [place] Other (See Comments)     Causes migraines per pt reports       Scheduled meds:   bisacodyl, 10 mg, Rectal, Daily  FLUoxetine, 20 mg, Oral, Nightly  fluticasone, 2 spray, Each Nare, BID  folic acid, 1 mg, Oral, Daily  metoprolol succinate XL, 50 mg, Oral, Q24H  neomycin-polymyxin-hydrocortisone, 4 drop, Both Ears, Q6H  oxymetazoline, 2 spray, Nasal, BID  pantoprazole, 40 mg, Oral, BID AC  rivaroxaban, 20 mg, Oral, Nightly  senna-docusate sodium, 2 tablet, Oral, Daily  sodium bicarbonate, 650 mg, Oral, BID  sodium chloride, 10 mL, Intravenous, Q12H  SUMAtriptan, 50 mg, Oral, Once        PRN meds:  hydrALAZINE  •  labetalol  •  ondansetron **OR** ondansetron  •  sodium chloride  •  [COMPLETED] Insert  peripheral IV **AND** sodium chloride  •  [COMPLETED] Insert peripheral IV **AND** sodium chloride  •  sodium chloride    Assessment/Plan       Intractable nausea and vomiting    Sepsis secondary to UTI (HCC)    Lactic acidosis    Hypokalemia    Essential (primary) hypertension    UTI (urinary tract infection), bacterial    Malfunction of nephrostomy tube (HCC)    Severe malnutrition (CMS/HCC)    Hypophosphatemia      Plan:  HPI.  Patient was admitted on 4/3 by Dr. Ricks.  Had COVID-19 here last year and ended up having an abdominal hematoma and had to go to [place].  Was there for a long time and then an LTAC and then a skilled nursing facility.  Comes back with multidrug-resistant UTI on Invanz.   She presented with complaints of intractable nausea and vomiting.  Her medical problems started last August when she developed COVID-19 pneumonia and had numerous complications thereafter.  She developed an abdominal hematoma and had to go on dialysis secondary to extrinsic compression of her urinary system. She was transferred from here to [facility] and required exploratory laparotomy.  She ended up having bilateral percutaneous nephrostomy tubes and also ended up with a left ureteral stent.  She states that her main urologist is Dr. Rina Rey.  She states that she stopped producing urine from her percutaneous nephrostomy tube recently and that there are plans for it to be removed.  She states that she had gotten in touch with [facility] to see if someone here could do it so she would not have to travel.  When she left [place], she spent considerable time at a skilled nursing facility in [place], [place].  She states that she has only been home a few weeks.  She was admitted to [facility] in [place] on 3/9 for electrolyte abnormalities.     UTI.  Renal function stable yesterday with a creatinine 0.76.  Dr. Ricks began treating a urinary tract infection with ceftriaxone.  She had  a multidrug-resistant Klebsiella in October 2021.  I transitioned her to [facility] on 4/3.  Lactate down to 1.7 from 2.8 after fluids.  Urine does appear infected with too numerous to count white blood cells, 4+ bacteria and large leukocyte esterase. The sample was also bloody.  Urine culture has grown 2 distinct pathogens.  There is a multidrug-resistant Proteus mirabilis strain and an ESBL Klebsiella strain.  Invanz should be treating both of these organisms concurrently.  Today is day 5 of Invanz.  We will treat 7-10 days.  Lactic acidosis improved after fluids and antibiotics.  Procalcitonin elevated at 0.31.  Dr. Dyer is familiar with her previous problems as he saw her last year.  I consulted him to evaluate her.  He wants to treat her current infection and then have her follow back at [facility].  On Invanz.  Recommendation from urology-  Urology saw her and wants her to go back to [facility] to have her percutaneous nephrostomy pulled.     Hypokalemia .    Resolved.  Magnesium-normal.     Sludge in gallbladder/dysfunctional gallbladder?.  General surgery consult.  GI consult. No surgery per general surgery.  CT scan abdomen pelvic-persistent mild but improved urothelial thickening involving the left renal pelvis and proximal left ureter- related to resolving UTI,  internal/external left-sided nephroureterostomy tube appears in satisfactory position, mild left-sided hydronephrosis which is decreased, Small amount gas is noted in the bladder,  No significant bladder wall thickening is identified, Small left pleural effusion- Increased hazy airspace opacities in the lung bases favored to represent diffuse atelectasis,  Based on the volume of gas within the GI tract- mild ileus may be present,  No evidence of bowel obstruction, Small amount fluid within the upper vagina where previously there was a small amount of gas, Hepatic steatosis.  HIDA scan-20% biliary ejection fraction.  Ultrasound abdomen  pelvic-Sludge-filled gallbladder with no significant gallbladder wall thickening, pericholecystic fluid or convincing evidence of acute cholecystitis, Mild dilation of the common hepatic duct with no visualized choledocholithiasis, Hepatic steatosis.    Metabolic acidosis.  Bicarb drip.  P.o. bicarb.     Nausea/vomiting.  Nausea vomiting resolved.  DC Reglan.  Protonix.  Bicarb. DC Inapsine as needed.  DC Compazine as needed. DC Phenergan as needed.     Sinus congestion/sinus pain.  Flonase.  Corticosporin eardrop.  ENT consult.  Williams.  Dr. Nichols saw her and has wanted to do an audiogram, but she has declined to go over to his office the last 2 days.   CTA chest-No evidence of pulmonary embolus, Small LEFT pleural effusion, Bandlike areas of reticulation throughout both lungs likely representing scarring/fibrosis related to prior Covid 19 infection.      Hypertension/tachycardia.  Tachycardia resolved.   Cut back Toprol.     History of pulmonary embolus.  Hold Xarelto possible surgery.    DC Lovenox and put patient back on Xarelto 4/11/2022.     Anxiety/depression/insomnia . Decrease Prozac nightly due to somnolence.   DC Ambien as needed due to somnolence.  DC trazodone due to some.     Headaches/altered mental status.    DC Fioricet as needed due to somnolence.   Neurology signed off.  CT scan head-No acute intracranial abnormality is seen.  EEG-pending.     Pain.  DC Robaxin as needed due to somnolence. Morphine as needed.     Anemia.  Folate deficiency . p.o. folate.  No sign of acute bleed.  Hemoglobin stable.     Allergic rhinitis.  Flonase.     Nutrition.  Full liquid diet.       Deconditioning.  PT and OT consult.  Last time patient walk was in August of last year after Covid. Patient is mostly bedbound at the nursing home.     Blood culture-contamination with coagulase negative Staphylococcus.  Urine culture shows Proteus and Klebsiella.  Covid-19-negative.  Flu screen A and B-negative.     Discharge  Plannin to 6 days.     agree with rehab placement in [place] or [place].  Family refused LTAC placement.    Electronically signed by Amado Villarreal MD, 22, 9:17 AM CDT.                    Electronically signed by Amado Villarreal MD at 22 0928       Consult Notes (last 24 hours)  Notes from 22 through 22   No notes of this type exist for this encounter.         Nutrition Notes (last 24 hours)  Notes from 22 through 22 103   No notes exist for this encounter.         Physical Therapy Notes (last 24 hours)  Notes from 22 through 22 103   No notes exist for this encounter.         Occupational Therapy Notes (last 24 hours)  Notes from 22 through 22   No notes exist for this encounter.         Speech Language Pathology Notes (last 24 hours)  Notes from 22 through 22 103   No notes exist for this encounter.         Respiratory Therapy Notes (last 24 hours)  Notes from 22 103 through 22 103   No notes exist for this encounter.

## 2022-04-14 ENCOUNTER — APPOINTMENT (OUTPATIENT)
Dept: GENERAL RADIOLOGY | Facility: HOSPITAL | Age: 47
End: 2022-04-14

## 2022-04-14 LAB
ALBUMIN SERPL-MCNC: 2.7 G/DL (ref 3.5–5.2)
ALBUMIN/GLOB SERPL: 0.8 G/DL
ALP SERPL-CCNC: 111 U/L (ref 39–117)
ALT SERPL W P-5'-P-CCNC: 13 U/L (ref 1–33)
ANION GAP SERPL CALCULATED.3IONS-SCNC: 13 MMOL/L (ref 5–15)
AST SERPL-CCNC: 21 U/L (ref 1–32)
BACTERIA UR QL AUTO: ABNORMAL /HPF
BASOPHILS # BLD AUTO: 0.06 10*3/MM3 (ref 0–0.2)
BASOPHILS NFR BLD AUTO: 0.6 % (ref 0–1.5)
BILIRUB SERPL-MCNC: 0.5 MG/DL (ref 0–1.2)
BILIRUB UR QL STRIP: NEGATIVE
BUN SERPL-MCNC: 7 MG/DL (ref 6–20)
BUN/CREAT SERPL: 9.7 (ref 7–25)
CALCIUM SPEC-SCNC: 8.8 MG/DL (ref 8.6–10.5)
CHLORIDE SERPL-SCNC: 99 MMOL/L (ref 98–107)
CLARITY UR: ABNORMAL
CO2 SERPL-SCNC: 25 MMOL/L (ref 22–29)
COLOR UR: YELLOW
CREAT SERPL-MCNC: 0.72 MG/DL (ref 0.57–1)
DEPRECATED RDW RBC AUTO: 45.7 FL (ref 37–54)
EGFRCR SERPLBLD CKD-EPI 2021: 105.9 ML/MIN/1.73
EOSINOPHIL # BLD AUTO: 0.18 10*3/MM3 (ref 0–0.4)
EOSINOPHIL NFR BLD AUTO: 1.7 % (ref 0.3–6.2)
ERYTHROCYTE [DISTWIDTH] IN BLOOD BY AUTOMATED COUNT: 14.9 % (ref 12.3–15.4)
GLOBULIN UR ELPH-MCNC: 3.3 GM/DL
GLUCOSE SERPL-MCNC: 110 MG/DL (ref 65–99)
GLUCOSE UR STRIP-MCNC: NEGATIVE MG/DL
HCT VFR BLD AUTO: 29.6 % (ref 34–46.6)
HGB BLD-MCNC: 9.9 G/DL (ref 12–15.9)
HGB UR QL STRIP.AUTO: ABNORMAL
HYALINE CASTS UR QL AUTO: ABNORMAL /LPF
IMM GRANULOCYTES # BLD AUTO: 0.09 10*3/MM3 (ref 0–0.05)
IMM GRANULOCYTES NFR BLD AUTO: 0.9 % (ref 0–0.5)
INR PPP: 1.44 (ref 0.91–1.09)
KETONES UR QL STRIP: ABNORMAL
LEUKOCYTE ESTERASE UR QL STRIP.AUTO: ABNORMAL
LYMPHOCYTES # BLD AUTO: 1.55 10*3/MM3 (ref 0.7–3.1)
LYMPHOCYTES NFR BLD AUTO: 14.7 % (ref 19.6–45.3)
MCH RBC QN AUTO: 28 PG (ref 26.6–33)
MCHC RBC AUTO-ENTMCNC: 33.4 G/DL (ref 31.5–35.7)
MCV RBC AUTO: 83.6 FL (ref 79–97)
MONOCYTES # BLD AUTO: 0.47 10*3/MM3 (ref 0.1–0.9)
MONOCYTES NFR BLD AUTO: 4.5 % (ref 5–12)
NEUTROPHILS NFR BLD AUTO: 77.6 % (ref 42.7–76)
NEUTROPHILS NFR BLD AUTO: 8.19 10*3/MM3 (ref 1.7–7)
NITRITE UR QL STRIP: NEGATIVE
NRBC BLD AUTO-RTO: 0.2 /100 WBC (ref 0–0.2)
PH UR STRIP.AUTO: >=9 [PH] (ref 5–8)
PLATELET # BLD AUTO: 354 10*3/MM3 (ref 140–450)
PMV BLD AUTO: 9.8 FL (ref 6–12)
POTASSIUM SERPL-SCNC: 3.4 MMOL/L (ref 3.5–5.2)
PROT SERPL-MCNC: 6 G/DL (ref 6–8.5)
PROT UR QL STRIP: ABNORMAL
PROTHROMBIN TIME: 17 SECONDS (ref 11.9–14.6)
RBC # BLD AUTO: 3.54 10*6/MM3 (ref 3.77–5.28)
RBC # UR STRIP: ABNORMAL /HPF
REF LAB TEST METHOD: ABNORMAL
SODIUM SERPL-SCNC: 137 MMOL/L (ref 136–145)
SP GR UR STRIP: 1.01 (ref 1–1.03)
SQUAMOUS #/AREA URNS HPF: ABNORMAL /HPF
UROBILINOGEN UR QL STRIP: ABNORMAL
WBC # UR STRIP: ABNORMAL /HPF
WBC NRBC COR # BLD: 10.54 10*3/MM3 (ref 3.4–10.8)

## 2022-04-14 PROCEDURE — 80053 COMPREHEN METABOLIC PANEL: CPT | Performed by: FAMILY MEDICINE

## 2022-04-14 PROCEDURE — 87186 SC STD MICRODIL/AGAR DIL: CPT | Performed by: FAMILY MEDICINE

## 2022-04-14 PROCEDURE — 74018 RADEX ABDOMEN 1 VIEW: CPT

## 2022-04-14 PROCEDURE — 25010000002 ENOXAPARIN PER 10 MG: Performed by: INTERNAL MEDICINE

## 2022-04-14 PROCEDURE — 25010000002 ERTAPENEM PER 500 MG: Performed by: FAMILY MEDICINE

## 2022-04-14 PROCEDURE — 81001 URINALYSIS AUTO W/SCOPE: CPT | Performed by: FAMILY MEDICINE

## 2022-04-14 PROCEDURE — 85025 COMPLETE CBC W/AUTO DIFF WBC: CPT | Performed by: FAMILY MEDICINE

## 2022-04-14 PROCEDURE — 87040 BLOOD CULTURE FOR BACTERIA: CPT | Performed by: NURSE PRACTITIONER

## 2022-04-14 PROCEDURE — 85610 PROTHROMBIN TIME: CPT | Performed by: INTERNAL MEDICINE

## 2022-04-14 PROCEDURE — 87086 URINE CULTURE/COLONY COUNT: CPT | Performed by: FAMILY MEDICINE

## 2022-04-14 PROCEDURE — 87077 CULTURE AEROBIC IDENTIFY: CPT | Performed by: FAMILY MEDICINE

## 2022-04-14 RX ORDER — SODIUM CHLORIDE, SODIUM LACTATE, POTASSIUM CHLORIDE, CALCIUM CHLORIDE 600; 310; 30; 20 MG/100ML; MG/100ML; MG/100ML; MG/100ML
75 INJECTION, SOLUTION INTRAVENOUS CONTINUOUS
Status: DISCONTINUED | OUTPATIENT
Start: 2022-04-14 | End: 2022-04-16

## 2022-04-14 RX ORDER — AMOXICILLIN 250 MG
2 CAPSULE ORAL DAILY
Status: DISCONTINUED | OUTPATIENT
Start: 2022-04-14 | End: 2022-04-15

## 2022-04-14 RX ORDER — FAMOTIDINE 10 MG/ML
20 INJECTION, SOLUTION INTRAVENOUS EVERY 12 HOURS SCHEDULED
Status: DISCONTINUED | OUTPATIENT
Start: 2022-04-14 | End: 2022-04-16

## 2022-04-14 RX ORDER — POTASSIUM CHLORIDE 20MEQ/15ML
40 LIQUID (ML) ORAL 2 TIMES DAILY
Status: COMPLETED | OUTPATIENT
Start: 2022-04-14 | End: 2022-04-14

## 2022-04-14 RX ORDER — FOLIC ACID 1 MG/1
1 TABLET ORAL DAILY
Status: DISCONTINUED | OUTPATIENT
Start: 2022-04-14 | End: 2022-04-20

## 2022-04-14 RX ADMIN — FLUTICASONE PROPIONATE 2 SPRAY: 50 SPRAY, METERED NASAL at 09:47

## 2022-04-14 RX ADMIN — Medication 10 ML: at 22:00

## 2022-04-14 RX ADMIN — DOCUSATE SODIUM 50 MG AND SENNOSIDES 8.6 MG 2 TABLET: 8.6; 5 TABLET, FILM COATED ORAL at 22:00

## 2022-04-14 RX ADMIN — FLUOXETINE HYDROCHLORIDE 20 MG: 20 CAPSULE ORAL at 22:00

## 2022-04-14 RX ADMIN — FOLIC ACID 1 MG: 1 TABLET ORAL at 22:00

## 2022-04-14 RX ADMIN — ENOXAPARIN SODIUM 70 MG: 100 INJECTION SUBCUTANEOUS at 17:50

## 2022-04-14 RX ADMIN — SODIUM BICARBONATE 650 MG: 650 TABLET ORAL at 22:00

## 2022-04-14 RX ADMIN — NEOMYCIN SULFATE, POLYMYXIN B SULFATE AND HYDROCORTISONE 4 DROP: 10; 3.5; 1 SUSPENSION/ DROPS AURICULAR (OTIC) at 00:14

## 2022-04-14 RX ADMIN — Medication 2 SPRAY: at 09:46

## 2022-04-14 RX ADMIN — FAMOTIDINE 20 MG: 10 INJECTION INTRAVENOUS at 09:47

## 2022-04-14 RX ADMIN — POTASSIUM CHLORIDE 40 MEQ: 20 SOLUTION ORAL at 22:00

## 2022-04-14 RX ADMIN — ERTAPENEM SODIUM 1 G: 1 INJECTION, POWDER, LYOPHILIZED, FOR SOLUTION INTRAMUSCULAR; INTRAVENOUS at 15:48

## 2022-04-14 RX ADMIN — Medication 10 ML: at 09:47

## 2022-04-14 RX ADMIN — FLUTICASONE PROPIONATE 2 SPRAY: 50 SPRAY, METERED NASAL at 22:01

## 2022-04-14 RX ADMIN — SODIUM BICARBONATE 100 MEQ: 84 INJECTION, SOLUTION INTRAVENOUS at 01:04

## 2022-04-14 RX ADMIN — FAMOTIDINE 20 MG: 10 INJECTION INTRAVENOUS at 22:00

## 2022-04-14 RX ADMIN — ENOXAPARIN SODIUM 70 MG: 100 INJECTION SUBCUTANEOUS at 01:04

## 2022-04-14 RX ADMIN — BISACODYL 10 MG: 10 SUPPOSITORY RECTAL at 09:47

## 2022-04-14 RX ADMIN — SODIUM CHLORIDE, POTASSIUM CHLORIDE, SODIUM LACTATE AND CALCIUM CHLORIDE 75 ML/HR: 600; 310; 30; 20 INJECTION, SOLUTION INTRAVENOUS at 13:52

## 2022-04-14 RX ADMIN — NEOMYCIN SULFATE, POLYMYXIN B SULFATE AND HYDROCORTISONE 4 DROP: 10; 3.5; 1 SUSPENSION/ DROPS AURICULAR (OTIC) at 13:51

## 2022-04-14 RX ADMIN — Medication 2 SPRAY: at 22:01

## 2022-04-14 RX ADMIN — NEOMYCIN SULFATE, POLYMYXIN B SULFATE AND HYDROCORTISONE 4 DROP: 10; 3.5; 1 SUSPENSION/ DROPS AURICULAR (OTIC) at 06:07

## 2022-04-14 RX ADMIN — NEOMYCIN SULFATE, POLYMYXIN B SULFATE AND HYDROCORTISONE 4 DROP: 10; 3.5; 1 SUSPENSION/ DROPS AURICULAR (OTIC) at 17:50

## 2022-04-14 NOTE — PLAN OF CARE
Goal Outcome Evaluation:  Plan of Care Reviewed With: patient, spouse        Progress: no change  Outcome Evaluation: VSS. No change in neuro status this shift. Pt is more arousable at times than previous shift worked. Continue to not take medication, MD notified. Turn every 2, pericare done, voiding per purewick. Safety maintained.

## 2022-04-14 NOTE — CASE MANAGEMENT/SOCIAL WORK
Continued Stay Note  [facility]     Patient Name: Namita Zabala  MRN: [id number]  Today's Date: 4/14/2022    Admit Date: 4/2/2022     Discharge Plan     Row Name 04/14/22 4434       Plan    Plan Nurse  from [facility] met with her team this AM. Requesting consideration by attending MD for tertiary transfer to [facility] for urology/ psyhc services that can't be provided at [facility]. Pending discussion with MD regarding transfer.     Dr Villarreal attempting transfer to [facility]. [facility] at high capacity level and saying they must be an established patient with emergent issues for transfer. Patient is established with Dr Rey. Pending acceptance at [facility].     Row Name 04/14/22 8194       Plan    Plan CM spoke with patient's spouse, Grant. He has been communicating with insurance rep regarding possibilty of EMS taking to ER at [facility] of [place], [place]. Explained that EMS is not covered by insurance usually unless transfer is for higher level of care that can't be given at [facility]. Spouse feels like she has other insurances that would cover this. We will see if her insurance would cover this transfer.  Patient currently bed bound with feeding tube nasally for AVANI.  Spouse does not want referral made to [facility] at this time since he hopes to take her to [facility]. Pending spouse getting return call from his  for more information.               Discharge Codes    No documentation.                     Mi Hardy RN

## 2022-04-14 NOTE — CASE MANAGEMENT/SOCIAL WORK
Tried to contact [facility] for decision and was told all staff related to admission decisions are in meeting until 10 30am and this SW would need to call back later. Will follow.    Recommendation is LTAC, asking Milly SCOTT if she can contact mother to see if they would be agreeable to go to LTAC in [facility].  Pt will need different nutrition source before SNF. Will follow.

## 2022-04-14 NOTE — PLAN OF CARE
Goal Outcome Evaluation:  Plan of Care Reviewed With: other (see comments) (Nursing)      Progress: no change  Outcome Evaluation: Ntn follow up. Pt with very minimal oral intake since admission. Over the past three days pt has only consumed bites of actually two meals per intake report. RD received tube feeding consult today. Initiate Peptamen 1.5 at 25 ml/hr for 24 hrs, increase by 10 ml/hr every 6 hrs as tolerated until goal rate of 80 ml/hr achieved. Routine water flush of 30 ml/hr via pump. Will monitor for tolerance of enteral feeding. If pt to be discharged to SNF pt may need peg tube placement. Cont to follow for plan of care.

## 2022-04-14 NOTE — PROGRESS NOTES
[facility] Medicine Services  INPATIENT PROGRESS NOTE    Length of Stay: 10  Date of Admission: 4/2/2022  Primary Care Physician: David Lara MD    Subjective   Chief Complaint: Failure to thrive/metabolic acidosis    HPI   Metabolic acidosis resolved.  Change bicarb drip to LR for now.  Recurrent urinary tract infection, start back on Invanz, culture still pending.  Patient is afebrile, blood pressure stable slightly tacky.    Review of Systems   Patient is arousable, refusing to talk.  No sign of acute distress.    All pertinent negatives and positives are as above. All other systems have been reviewed and are negative unless otherwise stated.     Objective    Temp:  [97.3 °F (36.3 °C)-98.4 °F (36.9 °C)] 97.6 °F (36.4 °C)  Heart Rate:  [] 101  Resp:  [18-20] 20  BP: (117-144)/(76-99) 139/97    Intake/Output Summary (Last 24 hours) at 4/14/2022 1324  Last data filed at 4/14/2022 0900  Gross per 24 hour   Intake 300 ml   Output 1000 ml   Net -700 ml     Physical Exam  Constitutional:       Appearance: She is well-developed.   HENT:      Head: Normocephalic.   Eyes:      Conjunctiva/sclera: Conjunctivae normal.      Pupils: Pupils are equal, round, and reactive to light.   Neck:      Vascular: No JVD.   Cardiovascular:      Rate and Rhythm: Regular rhythm.  Rates in the 100 .     Heart sounds: Normal heart sounds. No murmur heard.    No friction rub. No gallop.   Pulmonary:      Effort: No respiratory distress.      Breath sounds: No wheezing or rales.      Comments: Diminished breath sound bilateral, clear .  Chest:      Chest wall: No tenderness.   Abdominal:      General: Bowel sounds are normal. There is no distension.      Palpations: Abdomen is soft.      Tenderness: There is no abdominal tenderness. There is no guarding or rebound.   Musculoskeletal:         General: No tenderness or deformity.      Cervical back: Neck supple.   Skin:     General: Skin is  warm and dry.      Capillary Refill: Capillary refill takes 2 to 3 seconds.      Findings: No rash.   Neurological:      Cranial Nerves: No cranial nerve deficit.      Motor: Weakness present. No abnormal muscle tone.      Coordination: Coordination abnormal.      Gait: Gait abnormal.      Deep Tendon Reflexes: Reflexes normal.   Psychiatric:        Results Review:  I have reviewed the labs, radiology results, and diagnostic studies.    Lab Results (last 24 hours)     Procedure Component Value Units Date/Time    Comprehensive Metabolic Panel [[id number]]  (Abnormal) Collected: 04/14/22 1116    Specimen: Blood Updated: 04/14/22 1232     Glucose 110 mg/dL      BUN 7 mg/dL      Creatinine 0.72 mg/dL      Sodium 137 mmol/L      Potassium 3.4 mmol/L      Comment: Slight hemolysis detected by analyzer. Results may be affected.        Chloride 99 mmol/L      CO2 25.0 mmol/L      Calcium 8.8 mg/dL      Total Protein 6.0 g/dL      Albumin 2.70 g/dL      ALT (SGPT) 13 U/L      AST (SGOT) 21 U/L      Comment: Slight hemolysis detected by analyzer. Results may be affected.        Alkaline Phosphatase 111 U/L      Total Bilirubin 0.5 mg/dL      Globulin 3.3 gm/dL      A/G Ratio 0.8 g/dL      BUN/Creatinine Ratio 9.7     Anion Gap 13.0 mmol/L      eGFR 105.9 mL/min/1.73      Comment: National Kidney Foundation and American Society of Nephrology (ASN) Task Force recommended calculation based on the Chronic Kidney Disease Epidemiology Collaboration (CKD-EPI) equation refit without adjustment for race.       Narrative:      GFR Normal >60  Chronic Kidney Disease <60  Kidney Failure <15      Protime-INR [[id number]]  (Abnormal) Collected: 04/14/22 1116    Specimen: Blood Updated: 04/14/22 1219     Protime 17.0 Seconds      INR 1.44    CBC & Differential [[id number]]  (Abnormal) Collected: 04/14/22 1116    Specimen: Blood Updated: 04/14/22 1213    Narrative:      The following orders were created for panel order CBC &  Differential.  Procedure                               Abnormality         Status                     ---------                               -----------         ------                     CBC Auto Differential[[id number]]        Abnormal            Final result                 Please view results for these tests on the individual orders.    CBC Auto Differential [[id number]]  (Abnormal) Collected: 04/14/22 1116    Specimen: Blood Updated: 04/14/22 1213     WBC 10.54 10*3/mm3      RBC 3.54 10*6/mm3      Hemoglobin 9.9 g/dL      Hematocrit 29.6 %      MCV 83.6 fL      MCH 28.0 pg      MCHC 33.4 g/dL      RDW 14.9 %      RDW-SD 45.7 fl      MPV 9.8 fL      Platelets 354 10*3/mm3      Neutrophil % 77.6 %      Lymphocyte % 14.7 %      Monocyte % 4.5 %      Eosinophil % 1.7 %      Basophil % 0.6 %      Immature Grans % 0.9 %      Neutrophils, Absolute 8.19 10*3/mm3      Lymphocytes, Absolute 1.55 10*3/mm3      Monocytes, Absolute 0.47 10*3/mm3      Eosinophils, Absolute 0.18 10*3/mm3      Basophils, Absolute 0.06 10*3/mm3      Immature Grans, Absolute 0.09 10*3/mm3      nRBC 0.2 /100 WBC     Urinalysis, Microscopic Only - Urine, Catheter In/Out [[id number]]  (Abnormal) Collected: 04/14/22 1025    Specimen: Urine, Catheter In/Out Updated: 04/14/22 1050     RBC, UA 0-2 /HPF      WBC, UA Too Numerous to Count /HPF      Bacteria, UA 4+ /HPF      Squamous Epithelial Cells, UA None Seen /HPF      Hyaline Casts, UA None Seen /LPF      Methodology Manual Light Microscopy    Urine Culture - Urine, Urine, Catheter In/Out [[id number]] Collected: 04/14/22 1025    Specimen: Urine, Catheter In/Out Updated: 04/14/22 1050    Urinalysis With Culture If Indicated - Urine, Catheter In/Out [[id number]]  (Abnormal) Collected: 04/14/22 1025    Specimen: Urine, Catheter In/Out Updated: 04/14/22 1040     Color, UA Yellow     Appearance, UA Cloudy     pH, UA >=9.0     Specific Gravity, UA 1.007     Glucose, UA Negative     Ketones, UA Trace      Bilirubin, UA Negative     Blood, UA Large (3+)     Protein, UA Trace     Leuk Esterase, UA Large (3+)     Nitrite, UA Negative     Urobilinogen, UA 0.2 E.U./dL          Cultures:    No results found for: BLOODCX, URINECX, WOUNDCX, MRSACX, RESPCX, STOOLCX    Radiology Data:    Imaging Results (Last 24 Hours)     Procedure Component Value Units Date/Time    XR Abdomen KUB [[id number]] Resulted: 04/14/22 1252     Updated: 04/14/22 1255          Allergies   Allergen Reactions   • Coconut Unknown - High Severity   • Nuts Unknown - High Severity   • Penicillins    • [place] Other (See Comments)     Causes migraines per pt reports       Scheduled meds:   bisacodyl, 10 mg, Rectal, Daily  enoxaparin, 1 mg/kg, Subcutaneous, Once  [START ON 4/15/2022] enoxaparin, 1 mg/kg, Subcutaneous, Q12H  famotidine, 20 mg, Intravenous, Q12H  FLUoxetine, 20 mg, Oral, Nightly  fluticasone, 2 spray, Each Nare, BID  folic acid, 1 mg, Oral, Daily  metoprolol succinate XL, 50 mg, Oral, Q24H  neomycin-polymyxin-hydrocortisone, 4 drop, Both Ears, Q6H  oxymetazoline, 2 spray, Nasal, BID  senna-docusate sodium, 2 tablet, Oral, Daily  sodium bicarbonate, 650 mg, Oral, BID  sodium chloride, 10 mL, Intravenous, Q12H  SUMAtriptan, 50 mg, Oral, Once        PRN meds:  hydrALAZINE  •  labetalol  •  ondansetron **OR** ondansetron  •  Pharmacy to Dose enoxaparin (LOVENOX)  •  sodium chloride  •  [COMPLETED] Insert peripheral IV **AND** sodium chloride  •  [COMPLETED] Insert peripheral IV **AND** sodium chloride  •  sodium chloride    Assessment/Plan       Intractable nausea and vomiting    Sepsis secondary to UTI (HCC)    Lactic acidosis    Hypokalemia    Essential (primary) hypertension    UTI (urinary tract infection), bacterial    Malfunction of nephrostomy tube (HCC)    Severe malnutrition (CMS/HCC)    Hypophosphatemia        Plan:  HPI.  Patient was admitted on 4/3 by Dr. Ricks.  Had COVID-19 here last year and ended up having an abdominal hematoma and  had to go to [facility].  Was there for a long time and then an LTAC and then a skilled nursing facility.  Comes back with multidrug-resistant UTI on Invanz.   She presented with complaints of intractable nausea and vomiting.  Her medical problems started last August when she developed COVID-19 pneumonia and had numerous complications thereafter.  She developed an abdominal hematoma and had to go on dialysis secondary to extrinsic compression of her urinary system. She was transferred from [facility] to [facility] and required exploratory laparotomy.  She ended up having bilateral percutaneous nephrostomy tubes and also ended up with a left ureteral stent.  She states that her main urologist is Dr. Rina Rey.  She states that she stopped producing urine from her percutaneous nephrostomy tube recently and that there are plans for it to be removed.  She states that she had gotten in touch with [facility] to see if someone here could do it so she would not have to travel.  When she left [facility], she spent considerable time at a skilled nursing facility in [place], [place].  She states that she has only been home a few weeks.  She was admitted to [facility] in [place] on 3/9 for electrolyte abnormalities.     UTI.  Renal function stable yesterday with a creatinine 0.76.  Dr. Ricks began treating a urinary tract infection with ceftriaxone.  She had a multidrug-resistant Klebsiella in October 2021.  I transitioned her to [facility] on 4/3.  Lactate down to 1.7 from 2.8 after fluids.  Urine does appear infected with too numerous to count white blood cells, 4+ bacteria and large leukocyte esterase. The sample was also bloody.  Urine culture has grown 2 distinct pathogens.  There is a multidrug-resistant Proteus mirabilis strain and an ESBL Klebsiella strain.  Invanz should be treating both of these organisms concurrently.  Today is day 5 of Invanz.  We will treat 7-10 days.  Lactic acidosis  improved after fluids and antibiotics.  Procalcitonin elevated at 0.31.  Dr. Dyer is familiar with her previous problems as he saw her last year.  I consulted him to evaluate her.  He wants to treat her current infection and then have her follow back at [facility].  On Invanz.  Recommendation from urology-  Urology saw her and wants her to go back to [facility] to have her percutaneous nephrostomy pulled.     Hypokalemia .  P.o. potassium..  Magnesium-normal.     Sludge in gallbladder/dysfunctional gallbladder?.  General surgery consult.  GI consult. No surgery per general surgery.  CT scan abdomen pelvic-persistent mild but improved urothelial thickening involving the left renal pelvis and proximal left ureter- related to resolving UTI,  internal/external left-sided nephroureterostomy tube appears in satisfactory position, mild left-sided hydronephrosis which is decreased, Small amount gas is noted in the bladder,  No significant bladder wall thickening is identified, Small left pleural effusion- Increased hazy airspace opacities in the lung bases favored to represent diffuse atelectasis,  Based on the volume of gas within the GI tract- mild ileus may be present,  No evidence of bowel obstruction, Small amount fluid within the upper vagina where previously there was a small amount of gas, Hepatic steatosis.  HIDA scan-20% biliary ejection fraction.  Ultrasound abdomen pelvic-Sludge-filled gallbladder with no significant gallbladder wall thickening, pericholecystic fluid or convincing evidence of acute cholecystitis, Mild dilation of the common hepatic duct with no visualized choledocholithiasis, Hepatic steatosis.     Metabolic acidosis.  Bicarb drip.  P.o. bicarb.     Nausea/vomiting.  Nausea vomiting resolved.  DC Reglan.  Protonix.  Bicarb. DC Inapsine as needed.  DC Compazine as needed. DC Phenergan as needed.     Sinus congestion/sinus pain.  Flonase.  Corticosporin eardrop.  ENT consult.  Afrin.  Dr.  Simone saw her and has wanted to do an audiogram, but she has declined to go over to his office the last 2 days.   CTA chest-No evidence of pulmonary embolus, Small LEFT pleural effusion, Bandlike areas of reticulation throughout both lungs likely representing scarring/fibrosis related to prior Covid 19 infection.      Hypertension/tachycardia.  Tachycardia resolved.   Cut back Toprol.     History of pulmonary embolus.  Hold Xarelto possible surgery.    DC Lovenox and put patient back on Xarelto 2022.     Anxiety/depression/insomnia . Decrease Prozac nightly due to somnolence.   DC Ambien as needed due to somnolence.  DC trazodone due to some.     Headaches/altered mental status.    DC Fioricet as needed due to somnolence.   Neurology signed off.  CT scan head-No acute intracranial abnormality is seen.  EEG-pending.     Pain.  DC Robaxin as needed due to somnolence. Morphine as needed.      Anemia.  Folate deficiency . p.o. folate.  No sign of acute bleed.  Hemoglobin stable.     Allergic rhinitis.  Flonase.     Nutrition.  Start NG tube feeding.     Deconditioning.  PT and OT consult.  Last time patient walk was in August of last year after Covid. Patient is mostly bedbound at the nursing home.     Blood culture-contamination with coagulase negative Staphylococcus.   Covid-19-negative.  Flu screen A and B-negative.  Urine culture pending.     Discharge Plannin to 6 days.     agree with rehab placement in [place] or [place].  Family refused LTAC placement.  Just talk to [facility], wait for Dr. Rey to call back-urology.  Hopefully possible transfer.    Electronically signed by Amado Villarreal MD, 22, 1:24 PM CDT.

## 2022-04-14 NOTE — CASE MANAGEMENT/SOCIAL WORK
Continued Stay Note  [facility]     Patient Name: Namita Zabala  MRN: [id number]  Today's Date: 4/14/2022    Admit Date: 4/2/2022     Discharge Plan     Row Name 04/14/22 1215       Plan    Plan CM spoke with patient's spouse, Grant. He has been communicating with insurance rep regarding possibilty of EMS taking to ER at [facility] [place], [place]. Explained that EMS is not covered by insurance usually unless transfer is for higher level of care that can't be given at [facility]. Spouse feels like she has other insurances that would cover this. We will see if her insurance would cover this transfer.  Patient currently bed bound with feeding tube nasally for AVANI.  Spouse does not want referral made to [facility] at this time since he hopes to take her to [facility]. Pending spouse getting return call from his  for more information.    SW will get approx cost of EMS ride to [facility] for family information since this could be out of pocket pay.  Cost is $3,082.00 for EMS ride to [facility] ER.               Discharge Codes    No documentation.                     Mi Hardy RN

## 2022-04-14 NOTE — CONSULTS
"Pharmacy Dosing Service  Anticoagulant  Enoxaparin    Assessment/Action/Plan:  Initiated Lovenox 1mg/kg SQ every 12 hours.     Subjective:  Namita Zabala is a 46 y.o. female on Enoxaparin 70 mg SQ every 12 hours for indication of atrial fibrillation.  Objective:  [Ht: 170.2 cm (67\"); Wt: 68 kg (150 lb); BMI: Body mass index is 23.49 kg/m².]  Estimated Creatinine Clearance: 110.1 mL/min (by C-G formula based on SCr of 0.7 mg/dL).   Lab Results   Component Value Date    DDIMER >20.00 (H) 08/27/2021      Lab Results   Component Value Date    INR 1.46 (H) 04/08/2022    INR 1.0 01/18/2022    INR 1.0 12/29/2021    PROTIME 17.2 (H) 04/08/2022    PROTIME 13.3 01/18/2022    PROTIME 12.8 12/29/2021      Lab Results   Component Value Date    HGB 11.1 (L) 04/13/2022    HGB 10.1 (L) 04/12/2022    HGB 9.7 (L) 04/11/2022      Lab Results   Component Value Date     04/13/2022     04/12/2022     04/11/2022       Cielo Burt [facility]  04/14/22 02:22 CDT     "

## 2022-04-14 NOTE — PAYOR COMM NOTE
"AUTH: [id number]    Namita Cooper N (46 y.o. Female)             Date of Birth   1975    Social Security Number       Address   [address] [place], [place] [address]    Home Phone   [phone]    MRN   [id number]       [facility]    Marital Status                               Admission Date   4/2/22    Admission Type   Emergency    Admitting Provider   Amado Villarreal MD    Attending Provider   Amado Villarreal MD    Department, Room/Bed   [facility] 3A, 346/1       Discharge Date       Discharge Disposition       Discharge Destination                               Attending Provider: Amado Villarreal MD    Allergies: Coconut, Nuts, Penicillins, Turkey    Isolation: Contact   Infection: COVID (History) (09/15/21), ESBL Klebsiella (04/07/22)   Code Status: CPR   Advance Care Planning Activity    Ht: 170.2 cm (67\")   Wt: 68 kg (150 lb)    Admission Cmt: None   Principal Problem: Intractable nausea and vomiting [R11.2]                 Active Insurance as of 4/2/2022     Primary Coverage     Payor Plan Insurance Group Employer/Plan Group    ANTHEM BLUE CROSS ANTHEM BLUE CROSS BLUE SHIELD PPO [id number]     Payor Plan Address Payor Plan Phone Number Payor Plan Fax Number Effective Dates    [address] [phone]  1/1/2022 - None Entered    [facility] [id number]       Subscriber Name Subscriber Birth Date Member ID       GRANT COOPER D 1975 [id number]           Secondary Coverage     Payor Plan Insurance Group Employer/Plan Group    MEDICARE MEDICARE A & B      Payor Plan Address Payor Plan Phone Number Payor Plan Fax Number Effective Dates    [address] [phone]  7/1/2019 - None Entered    [facility] [id number]       Subscriber Name Subscriber Birth Date Member ID       NAMITA COOPER N 1975 [id number]                 Emergency Contacts      (Rel.) Home Phone Work Phone Mobile Phone    BaronGrant valdivia (Spouse) -- -- [phone]    " Noel Johnson (Father) [phone] -- [phone]    Marquita Johnson (Mother) -- -- [phone]    BaronNeida valdivia -- -- [phone]               Physician Progress Notes (last 48 hours)      Nneka Barton, APRN at 22 1000          Palliative Care Daily Progress Note   Chief complaint: goals of care/advanced care planning  Code Status and Medical Interventions:   Ordered at: 22 0411     Level Of Support Discussed With:    Patient     Code Status (Patient has no pulse and is not breathing):    CPR (Attempt to Resuscitate)     Medical Interventions (Patient has pulse or is breathing):    Full Support     Subjective   Medical record reviewed. Events noted. Attending able to speak with patient's spouse further yesterday afternoon and he was agreeable for rehab placement in [place] or [place].  Morning labs revealed she is slightly hyponatremic with sodium at 134 however other labs remain unremarkable. Appears asleep at time of exam, continues to not arouse, follow commands or answer questions. Does not appear in any distress, no visitors at bedside.       Advance Care Planning     Advance Directive Status: Patient does not have advance directive     Due to the Palliative Care Topics Discussed: palliative care, goals of care, care options and discharge options we will establish an advance care plan.   Goals of care: Ongoing.    The patient receives support from her spouse, parent and extended family.  POA/Healthcare Surrogate - Next of kin is her spouse, Grant.     Review of Systems   Unable to perform ROS: patient nonverbal     Pain Assessment  Nonverbal Indicators of Pain: grimace  CPOT and PAINAD Scales: PAINAD (Pain Assessment in Advance Dementia Scale)  PAINAD Breathin-->normal  PAINAD Negative Vocalization: 1-->occasional moan/groan, low speech, negative/disapproving quality (when turning)  PAINAD Facial Expression: 0-->smiling or inexpressive  PAINAD Body Language: 0-->relaxed  PAINAD  Consolability: 0-->no need to console  PAINAD Score: 1  Pain Location: abdomen    Objective   Diagnostics: Reviewed      Intake/Output Summary (Last 24 hours) at 4/13/2022 0921  Last data filed at 4/13/2022 0452  Gross per 24 hour   Intake 2084.65 ml   Output 1800 ml   Net 284.65 ml     Current Facility-Administered Medications   Medication Dose Route Frequency Provider Last Rate Last Admin   • bisacodyl (DULCOLAX) suppository 10 mg  10 mg Rectal Daily Amado Villarreal MD   10 mg at 04/13/22 0912   • FLUoxetine (PROzac) capsule 20 mg  20 mg Oral Nightly Amado Villarreal MD   20 mg at 04/12/22 2051   • fluticasone (FLONASE) 50 MCG/ACT nasal spray 2 spray  2 spray Each Nare BID Frank Ricks MD   2 spray at 04/13/22 0912   • folic acid (FOLVITE) tablet 1 mg  1 mg Oral Daily Verónica Erickson APRN   1 mg at 04/12/22 1321   • hydrALAZINE (APRESOLINE) injection 10 mg  10 mg Intravenous Q4H PRN Amado Villarreal MD       • labetalol (NORMODYNE,TRANDATE) injection 10 mg  10 mg Intravenous Q4H PRN Amado Villarreal MD   10 mg at 04/10/22 1518   • metoprolol succinate XL (TOPROL-XL) 24 hr tablet 50 mg  50 mg Oral Q24H Amado Villarreal MD   50 mg at 04/12/22 0936   • neomycin-polymyxin-hydrocortisone (CORTISPORIN) otic suspension 4 drop  4 drop Both Ears Q6H Fermin Mcclure DO   4 drop at 04/13/22 0554   • ondansetron (ZOFRAN) tablet 4 mg  4 mg Oral Q6H PRN Frank Ricks MD        Or   • ondansetron (ZOFRAN) injection 4 mg  4 mg Intravenous Q6H PRN Frank Ricks MD   4 mg at 04/07/22 0904   • oxymetazoline (AFRIN) nasal spray 2 spray  2 spray Nasal BID Frank Ricks MD   2 spray at 04/13/22 0916   • pantoprazole (PROTONIX) EC tablet 40 mg  40 mg Oral BID AC Amado Villarreal MD   40 mg at 04/12/22 1831   • rivaroxaban (XARELTO) tablet 20 mg  20 mg Oral Nightly Amado Villarreal MD   20 mg at 04/12/22 2051   • sennosides-docusate (PERICOLACE) 8.6-50 MG per tablet 2 tablet  2 tablet Oral Daily Amado Villarreal MD   2  "tablet at 04/12/22 0936   • sodium bicarbonate tablet 650 mg  650 mg Oral BID Amado Villarreal MD       • sodium chloride 0.9 % flush 10 mL  10 mL Intravenous PRN Frank Ricks MD       • sodium chloride 0.9 % flush 10 mL  10 mL Intravenous PRN Frank Ricks MD       • sodium chloride 0.9 % flush 10 mL  10 mL Intravenous PRN Frank Ricks MD       • sodium chloride 0.9 % flush 10 mL  10 mL Intravenous Q12H Frank Ricks MD   10 mL at 04/12/22 0937   • sodium chloride 0.9 % flush 10 mL  10 mL Intravenous PRN Frank Ricks MD   10 mL at 04/10/22 1518   • SUMAtriptan (IMITREX) tablet 50 mg  50 mg Oral Once Frank Ricks MD            hydrALAZINE  •  labetalol  •  ondansetron **OR** ondansetron  •  sodium chloride  •  [COMPLETED] Insert peripheral IV **AND** sodium chloride  •  [COMPLETED] Insert peripheral IV **AND** sodium chloride  •  sodium chloride    Assessment:  Vital Signs: /91 (BP Location: Left arm, Patient Position: Lying)   Pulse 94   Temp 98.4 °F (36.9 °C) (Oral)   Resp 16   Ht 170.2 cm (67\")   Wt 68 kg (150 lb)   SpO2 100%   BMI 23.49 kg/m²     Physical Exam  Vitals and nursing note reviewed.   Constitutional:       General: She is sleeping. She is not in acute distress.     Appearance: She is ill-appearing.   HENT:      Head: Normocephalic and atraumatic.   Eyes:      General: Lids are normal.   Cardiovascular:      Rate and Rhythm: Tachycardia present.      Heart sounds: Normal heart sounds.   Pulmonary:      Effort: Pulmonary effort is normal.      Breath sounds: Decreased breath sounds present.   Abdominal:      General: Bowel sounds are normal. There is no distension.      Palpations: Abdomen is soft.      Comments: Mepilex to midline abdominal incision, CDI.    Genitourinary:     Comments: Left sided nephrostomy.  Musculoskeletal:      Right foot: Foot drop present.      Left foot: Foot drop present.      Comments: Multipodus boots present to BLE    Skin:     " General: Skin is warm and dry.      Coloration: Skin is pale.   Neurological:      Comments: CARLI   Psychiatric:         Speech: She is noncommunicative.         Behavior: Behavior is uncooperative.         Cognition and Memory: Cognition is impaired.      Comments: CARLI     Patient status: Disease state: Controlled with current treatments.  Functional status: Palliative Performance Scale Score: Performance 10% based on the following measures: Ambulation: Totally bed bound, Activity and Evidence of Disease: Unable to do any work, extensive evidence of disease, Self-Care: Total care required,  Intake: Mouth care only, LOC: Drowsy or comatose. Nutritional status: Albumin 2.30.Body mass index is 23.49 kg/m².    Impression/Problem List:  1.    Altered mental status  2.    Impaired mobility and ability to perform activities of daily living  3.    Severe malnutrition  4.    Urinary tract infection  5.    Sepsis secondary to UTI  6.    Hypertension  7.    Malfunctioning nephrostomy tube  8.    Lactic acidosis  9.    Impaired functioning of gallbladder (sludge-filled and 20% EF per imaging)  10.  Hepatic steatosis  11.  Anemia  12.  Intractable nausea and vomiting  13.  Pleural effusion, left  14.  Folate deficiency  15.  Anxiety  16.  Hyponatremia  17.  History of COVID-19 (August 2021)     Plans/Recommendations     1. Goals of care include CODE STATUS CPR with full support interventions.    2. Palliative care encounter  - Prognosis is guarded long-term secondary to poor performance status, altered mental status, severe malnutrition, urinary tract infection, sepsis, hepatic steatosis, anemia, impaired gallbladder functioning, recent complex medical history including renal failure requiring temporary dialysis, nephrostomy placement, bladder rupture and many other comorbidities listed above.   - Discussed with attending, he met with patient's spouse yesterday afternoon and shared spouse is agreeable to discharge to skilled  nursing facility. /CM following and referrals have been sent.   - Will plan to follow up with patient/spouse if she remains hospitalized on Friday or sooner if needed.      Thank you for allowing us to participate in patient's plan of care. Palliative Care Team will continue to follow patient.     Time spent: 25 minutes spent reviewing medical and medication records, assessing and examining patient, discussing with family, answering questions, providing some guidance about a plan and documentation of care, and coordinating care with other healthcare members, with > 50% time spent face to face.     BERT White  4/13/2022      Electronically signed by Nneka Barton APRN at 04/13/22 1030     Amado Villarreal MD at 04/13/22 0917              [facility]  INPATIENT PROGRESS NOTE    Length of Stay: 9  Date of Admission: 4/2/2022  Primary Care Physician: David Lara MD    Subjective   Chief Complaint: Failure to thrive/metabolic acidosis    HPI   Patient is more alert today and awake.  Blood pressure stable.  Afebrile.  Heart rate is 90.  Patient still refusing to talk, seems selective to me.    Review of Systems   Patient is arousable, refusing to talk.  No sign of acute distress.    All pertinent negatives and positives are as above. All other systems have been reviewed and are negative unless otherwise stated.     Objective    Temp:  [97 °F (36.1 °C)-98.4 °F (36.9 °C)] 98.4 °F (36.9 °C)  Heart Rate:  [79-94] 94  Resp:  [16-18] 16  BP: (136-150)/(70-91) 141/91    Intake/Output Summary (Last 24 hours) at 4/13/2022 0917  Last data filed at 4/13/2022 0452  Gross per 24 hour   Intake 2084.65 ml   Output 1800 ml   Net 284.65 ml     Physical Exam  Constitutional:       Appearance: She is well-developed.   HENT:      Head: Normocephalic.   Eyes:      Conjunctiva/sclera: Conjunctivae normal.      Pupils: Pupils are equal, round, and reactive to light.    Neck:      Vascular: No JVD.   Cardiovascular:      Rate and Rhythm: Regular rhythm.  Regular rate.     Heart sounds: Normal heart sounds. No murmur heard.    No friction rub. No gallop.   Pulmonary:      Effort: No respiratory distress.      Breath sounds: No wheezing or rales.      Comments: Diminished breath sound bilateral, clear .  Chest:      Chest wall: No tenderness.   Abdominal:      General: Bowel sounds are normal. There is no distension.      Palpations: Abdomen is soft.      Tenderness: There is no abdominal tenderness. There is no guarding or rebound.   Musculoskeletal:         General: No tenderness or deformity.      Cervical back: Neck supple.   Skin:     General: Skin is warm and dry.      Capillary Refill: Capillary refill takes 2 to 3 seconds.      Findings: No rash.   Neurological:      Cranial Nerves: No cranial nerve deficit.      Motor: Weakness present. No abnormal muscle tone.      Coordination: Coordination abnormal.      Gait: Gait abnormal.      Deep Tendon Reflexes: Reflexes normal.   Psychiatric:        Results Review:  Lab Results (last 24 hours)     Procedure Component Value Units Date/Time    Basic Metabolic Panel [[id number]]  (Abnormal) Collected: 04/13/22 0725    Specimen: Blood Updated: 04/13/22 0752     Glucose 83 mg/dL      BUN 8 mg/dL      Creatinine 0.70 mg/dL      Sodium 134 mmol/L      Potassium 3.8 mmol/L      Comment: Slight hemolysis detected by analyzer. Results may be affected.        Chloride 99 mmol/L      CO2 18.0 mmol/L      Calcium 8.9 mg/dL      BUN/Creatinine Ratio 11.4     Anion Gap 17.0 mmol/L      eGFR 109.5 mL/min/1.73      Comment: National Kidney Foundation and American Society of Nephrology (ASN) Task Force recommended calculation based on the Chronic Kidney Disease Epidemiology Collaboration (CKD-EPI) equation refit without adjustment for race.       Narrative:      GFR Normal >60  Chronic Kidney Disease <60  Kidney Failure <15      CBC (No Diff)  [[id number]] Updated: 04/13/22 0735    Specimen: Blood            Cultures:  No results found for: BLOODCX, URINECX, WOUNDCX, MRSACX, RESPCX, STOOLCX    Radiology Data:    Imaging Results (Last 24 Hours)     ** No results found for the last 24 hours. **          Allergies   Allergen Reactions   • Coconut Unknown - High Severity   • Nuts Unknown - High Severity   • Penicillins    • Turkey Other (See Comments)     Causes migraines per pt reports       Scheduled meds:   bisacodyl, 10 mg, Rectal, Daily  FLUoxetine, 20 mg, Oral, Nightly  fluticasone, 2 spray, Each Nare, BID  folic acid, 1 mg, Oral, Daily  metoprolol succinate XL, 50 mg, Oral, Q24H  neomycin-polymyxin-hydrocortisone, 4 drop, Both Ears, Q6H  oxymetazoline, 2 spray, Nasal, BID  pantoprazole, 40 mg, Oral, BID AC  rivaroxaban, 20 mg, Oral, Nightly  senna-docusate sodium, 2 tablet, Oral, Daily  sodium bicarbonate, 650 mg, Oral, BID  sodium chloride, 10 mL, Intravenous, Q12H  SUMAtriptan, 50 mg, Oral, Once        PRN meds:  hydrALAZINE  •  labetalol  •  ondansetron **OR** ondansetron  •  sodium chloride  •  [COMPLETED] Insert peripheral IV **AND** sodium chloride  •  [COMPLETED] Insert peripheral IV **AND** sodium chloride  •  sodium chloride    Assessment/Plan       Intractable nausea and vomiting    Sepsis secondary to UTI (HCC)    Lactic acidosis    Hypokalemia    Essential (primary) hypertension    UTI (urinary tract infection), bacterial    Malfunction of nephrostomy tube (HCC)    Severe malnutrition (CMS/HCC)    Hypophosphatemia      Plan:  HPI.  Patient was admitted on 4/3 by Dr. Ricks.  Had COVID-19 here last year and ended up having an abdominal hematoma and had to go to [facility].  Was there for a long time and then an LTAC and then a skilled nursing facility.  Comes back with multidrug-resistant UTI on Invanz.   She presented with complaints of intractable nausea and vomiting.  Her medical problems started last August when she developed COVID-19  pneumonia and had numerous complications thereafter.  She developed an abdominal hematoma and had to go on dialysis secondary to extrinsic compression of her urinary system. She was transferred from here to [facility] and required exploratory laparotomy.  She ended up having bilateral percutaneous nephrostomy tubes and also ended up with a left ureteral stent.  She states that her main urologist is Dr. Rina Rey.  She states that she stopped producing urine from her percutaneous nephrostomy tube recently and that there are plans for it to be removed.  She states that she had gotten in touch with [facility] to see if someone here could do it so she would not have to travel.  When she left [facility], she spent considerable time at a skilled nursing facility in [place], [place].  She states that she has only been home a few weeks.  She was admitted to [facility] in [place] on 3/9 for electrolyte abnormalities.     UTI.  Renal function stable yesterday with a creatinine 0.76.  Dr. Ricks began treating a urinary tract infection with ceftriaxone.  She had a multidrug-resistant Klebsiella in October 2021.  [facility] transitioned [facility] to [facility].  Lactate down to 1.7 from 2.8 after fluids.  Urine does appear infected with too numerous to count white blood cells, 4+ bacteria and large leukocyte esterase. The sample was also bloody.  Urine culture has grown 2 distinct pathogens.  There is a multidrug-resistant Proteus mirabilis strain and an ESBL Klebsiella strain.  Invanz should be treating both of these organisms concurrently.  [facility].  We will treat 7-10 days.  Lactic acidosis improved after fluids and antibiotics.  Procalcitonin elevated at 0.31.  Dr. Dyer is familiar with her previous problems as he saw her last year.  I consulted him to evaluate her.  He wants to treat her current infection and then have her follow back at [facility].  On [facility].  Recommendation  from urology-  Urology saw her and wants her to go back to [place] to have her percutaneous nephrostomy pulled.     Hypokalemia .    Resolved.  Magnesium-normal.     Sludge in gallbladder/dysfunctional gallbladder?.  General surgery consult.  GI consult. No surgery per general surgery.  CT scan abdomen pelvic-persistent mild but improved urothelial thickening involving the left renal pelvis and proximal left ureter- related to resolving UTI,  internal/external left-sided nephroureterostomy tube appears in satisfactory position, mild left-sided hydronephrosis which is decreased, Small amount gas is noted in the bladder,  No significant bladder wall thickening is identified, Small left pleural effusion- Increased hazy airspace opacities in the lung bases favored to represent diffuse atelectasis,  Based on the volume of gas within the GI tract- mild ileus may be present,  No evidence of bowel obstruction, Small amount fluid within the upper vagina where previously there was a small amount of gas, Hepatic steatosis.  HIDA scan-20% biliary ejection fraction.  Ultrasound abdomen pelvic-Sludge-filled gallbladder with no significant gallbladder wall thickening, pericholecystic fluid or convincing evidence of acute cholecystitis, Mild dilation of the common hepatic duct with no visualized choledocholithiasis, Hepatic steatosis.    Metabolic acidosis.  Bicarb drip.  P.o. bicarb.     Nausea/vomiting.  Nausea vomiting resolved.  DC Reglan.  Protonix.  Bicarb. DC Inapsine as needed.  DC Compazine as needed. DC Phenergan as needed.     Sinus congestion/sinus pain.  Flonase.  Corticosporin eardrop.  ENT consult.  Afrin.  Dr. Nichols saw her and has wanted to do an audiogram, but she has declined to go over to his office the last 2 days.   CTA chest-No evidence of pulmonary embolus, Small LEFT pleural effusion, Bandlike areas of reticulation throughout both lungs likely representing scarring/fibrosis related to prior Covid 19  infection.      Hypertension/tachycardia.  Tachycardia resolved.   Cut back Toprol.     History of pulmonary embolus.  Hold Xarelto possible surgery.    DC Lovenox and put patient back on Xarelto 2022.     Anxiety/depression/insomnia . Decrease Prozac nightly due to somnolence.   DC Ambien as needed due to somnolence.  DC trazodone due to some.     Headaches/altered mental status.    DC Fioricet as needed due to somnolence.   Neurology signed off.  CT scan head-No acute intracranial abnormality is seen.  EEG-pending.     Pain.  DC Robaxin as needed due to somnolence. Morphine as needed.     Anemia.  Folate deficiency . p.o. folate.  No sign of acute bleed.  Hemoglobin stable.     Allergic rhinitis.  Flonase.     Nutrition.  Full liquid diet.       Deconditioning.  PT and OT consult.  Last time patient walk was in August of last year after Covid. Patient is mostly bedbound at the nursing home.     Blood culture-contamination with coagulase negative Staphylococcus.  Urine culture shows Proteus and Klebsiella.  Covid-19-negative.  Flu screen A and B-negative.     Discharge Plannin to 6 days.     agree with rehab placement in [place] or [place].  Family refused LTAC placement.    Electronically signed by Amado Villarreal MD, 22, 9:17 AM CDT.                    Electronically signed by Amado Villarreal MD at 22 0961     Nneka Barton APRN at 22 1015          Palliative Care Daily Progress Note   Chief complaint: goals of care/advanced care planning    Code Status and Medical Interventions:   Ordered at: 22 0411     Level Of Support Discussed With:    Patient     Code Status (Patient has no pulse and is not breathing):    CPR (Attempt to Resuscitate)     Medical Interventions (Patient has pulse or is breathing):    Full Support     Subjective   Medical record reviewed. Events noted. Neurology consulted yesterday by attending for change in mental status. Neurology completed  "exam and felt neuro exam suggested some psychogenic overlay but could be related to metabolic encephalopathy. Originally had orders for MRI however changed to CT of head which revealed no acute intracranial abnormalities. An EEG was also ordered and official report pending. Preliminary report of EEG revealed generalized slowing likely related to metabolic disturbances. PT has evaluated and signed off as she is unable to participate and not appropriate for therapy. When first entered room she was awake, moaning and moving bilateral upper extremities. As soon as she was spoken to she closed her eyes and stopped moving extremities. She would not open her eyes or follow any commands. No visitors at bedside.     Advance Care Planning   Advance Care Planning Discussion:  Spoke to patient's spouse, Grant, and provided update. Notified him of his full voicemail as well as he expressed frustration with being unable to determine who had attempted to call him. Reports he was unaware of this. Discussed concerns with altered mental status. He reflected on conversation his mother-in-law had with neurology yesterday and reports he is understands \"her brain is working slowly because of COVID.\" Explained that there are concerns for this or metabolic abnormalities however it was difficult to pinpoint a primary cause. Shared that his spouse was alert when first entered room earlier today however when she realized provider was present she closed her eyes and would not participate in exam. He explained that he is concerned this is due to her difficulty hearing. Explained treatment teams concerns as hearing loss typically does not cause a patient to refuse interaction or ability to follow all commands. Discussed interventions including writing and/or acting things out however she has not been able to participate with those tasks. He again expressed concerns and reflected on his spouses ability to speak and follow commands when admitted " "and during the early part of her hospitalization. Grant expressed, \"It's a scary situation because she hasn't been able to talk in several days.\" Grant expressed again, \"I just want her to have everything done to find out what is causing this.\" Unsure he is completely understanding symptoms/behaviors his spouse is experiencing or prognostic awareness at current state. Plans to arrive to hospital around 4:15 PM to discuss further with attending.     Advance Directive Status: Patient does not have advance directive     Due to the Palliative Care Topics Discussed: palliative care, goals of care, care options and discharge options we will establish an advance care plan.   Goals of care: Ongoing.    The patient receives support from her spouse, parent and extended family.  POA/Healthcare Surrogate - Next of kin is her spouse, Grant.     Review of Systems   Unable to perform ROS: patient nonverbal     Pain Assessment  Nonverbal Indicators of Pain: grimace  CPOT and PAINAD Scales: PAINAD (Pain Assessment in Advance Dementia Scale)  PAINAD Breathin-->normal  PAINAD Negative Vocalization: 0-->none  PAINAD Facial Expression: 0-->smiling or inexpressive  PAINAD Body Language: 0-->relaxed  PAINAD Consolability: 0-->no need to console  PAINAD Score: 0  Pain Location: abdomen    Objective   Diagnostics: Reviewed      Intake/Output Summary (Last 24 hours) at 2022  Last data filed at 2022 2100  Gross per 24 hour   Intake 165.51 ml   Output 450 ml   Net -284.49 ml     Current Facility-Administered Medications   Medication Dose Route Frequency Provider Last Rate Last Admin   • bisacodyl (DULCOLAX) suppository 10 mg  10 mg Rectal Daily Amado Villarreal MD   10 mg at 22 0851   • FLUoxetine (PROzac) capsule 20 mg  20 mg Oral Nightly Amado Villarreal MD   20 mg at 22   • fluticasone (FLONASE) 50 MCG/ACT nasal spray 2 spray  2 spray Each Nare BID Frank Ricks MD   2 spray at 22 2017   • " hydrALAZINE (APRESOLINE) injection 10 mg  10 mg Intravenous Q4H PRN Amado Villarreal MD       • labetalol (NORMODYNE,TRANDATE) injection 10 mg  10 mg Intravenous Q4H PRN Amado Villarreal MD   10 mg at 04/10/22 1518   • lactated ringers infusion  75 mL/hr Intravenous Continuous Amado Villarreal MD 75 mL/hr at 04/12/22 0526 75 mL/hr at 04/12/22 0526   • metoprolol succinate XL (TOPROL-XL) 24 hr tablet 50 mg  50 mg Oral Q24H Amado Villarreal MD   50 mg at 04/11/22 0851   • neomycin-polymyxin-hydrocortisone (CORTISPORIN) otic suspension 4 drop  4 drop Both Ears Q6H Fermin Mcclure DO   4 drop at 04/12/22 0526   • ondansetron (ZOFRAN) tablet 4 mg  4 mg Oral Q6H PRN Frank Ricks MD        Or   • ondansetron (ZOFRAN) injection 4 mg  4 mg Intravenous Q6H PRN Frank Ricks MD   4 mg at 04/07/22 0904   • oxymetazoline (AFRIN) nasal spray 2 spray  2 spray Nasal BID Frank Ricks MD   2 spray at 04/11/22 2017   • pantoprazole (PROTONIX) EC tablet 40 mg  40 mg Oral BID AC Amado Villarreal MD   40 mg at 04/12/22 0631   • rivaroxaban (XARELTO) tablet 20 mg  20 mg Oral Nightly Amado Villarreal MD   20 mg at 04/11/22 2016   • sennosides-docusate (PERICOLACE) 8.6-50 MG per tablet 2 tablet  2 tablet Oral Daily Amado Villarreal MD   2 tablet at 04/11/22 0851   • sodium bicarbonate tablet 650 mg  650 mg Oral BID Fermin Mcclure DO   650 mg at 04/11/22 2016   • sodium chloride 0.9 % flush 10 mL  10 mL Intravenous PRN Frank Rikcs MD       • sodium chloride 0.9 % flush 10 mL  10 mL Intravenous PRN Frank Ricks MD       • sodium chloride 0.9 % flush 10 mL  10 mL Intravenous PRN Frank Ricks MD       • sodium chloride 0.9 % flush 10 mL  10 mL Intravenous Q12H Frank Ricks MD   10 mL at 04/11/22 2020   • sodium chloride 0.9 % flush 10 mL  10 mL Intravenous PRN Frank Ricks MD   10 mL at 04/10/22 1518   • SUMAtriptan (IMITREX) tablet 50 mg  50 mg Oral Once Frank Ricks MD         lactated  "ringers, 75 mL/hr, Last Rate: 75 mL/hr (04/12/22 0526)      hydrALAZINE  •  labetalol  •  ondansetron **OR** ondansetron  •  sodium chloride  •  [COMPLETED] Insert peripheral IV **AND** sodium chloride  •  [COMPLETED] Insert peripheral IV **AND** sodium chloride  •  sodium chloride    Assessment:  Vital Signs: /82 (BP Location: Left arm, Patient Position: Lying)   Pulse 86   Temp 98 °F (36.7 °C) (Axillary)   Resp 16   Ht 170.2 cm (67\")   Wt 71.6 kg (157 lb 14.4 oz)   SpO2 98%   BMI 24.73 kg/m²     Physical Exam  Vitals and nursing note reviewed.   Constitutional:       General: She is sleeping. She is not in acute distress.     Appearance: She is ill-appearing.   HENT:      Head: Normocephalic and atraumatic.   Eyes:      General: Lids are normal.   Cardiovascular:      Rate and Rhythm: Normal rate and regular rhythm.   Pulmonary:      Effort: Pulmonary effort is normal.      Breath sounds: Decreased breath sounds present.   Abdominal:      General: Bowel sounds are normal. There is no distension.      Palpations: Abdomen is soft.      Comments: Mepilex to midline abdominal incision, CDI.    Genitourinary:     Comments: Left sided nephrostomy.  Musculoskeletal:      Right foot: Foot drop present.      Left foot: Foot drop present.      Comments: Multipodus boots present to BLE    Skin:     General: Skin is warm and dry.      Coloration: Skin is pale.   Neurological:      Comments: CARLI; alert when entered room but quickly closed eyes and refused to open eyes or follow any commands   Psychiatric:         Speech: She is noncommunicative.         Behavior: Behavior is uncooperative.         Cognition and Memory: Cognition is impaired.      Comments: CARLI; alert when entered room but quickly closed eyes and refused to open eyes or follow any commands     Patient status: Disease state: Controlled with current treatments.  Functional status: Palliative Performance Scale Score: Performance 10% based on the " "following measures: Ambulation: Totally bed bound, Activity and Evidence of Disease: Unable to do any work, extensive evidence of disease, Self-Care: Total care required,  Intake: Mouth care only, LOC: Drowsy or comatose   Nutritional status: Albumin 2.30.Body mass index is 24.73 kg/m².    Impression/Problem List:  1.    Poor performance status  2.    Altered mental status  3.    Severe malnutrition  4.    Urinary tract infection  5.    Sepsis secondary to UTI  6.    Hypertension  7.    Malfunctioning nephrostomy tube  8.    Lactic acidosis  9.    Impaired functioning of gallbladder (sludge-filled and 20% EF per imaging)  10.  Hepatic steatosis  11.  Anemia  12.  Intractable nausea and vomiting  13.  Pleural effusion, left  14.  Folate deficiency  15.  Anxiety  16.  History of COVID-19 (August 2021)  17.  Hypokalemia, resolved  18.  Hypocalcemia, resolved     Plans/Recommendations     3. Goals of care include CODE STATUS CPR with full support interventions.    4. Palliative care encounter  - Prognosis is guarded long-term secondary to poor performance status, altered mental status, severe malnutrition, urinary tract infection, sepsis, hepatic steatosis, anemia, impaired gallbladder functioning, recent complex medical history including renal failure requiring temporary dialysis, nephrostomy placement, bladder rupture and many other comorbidities listed above.   - Neurology has signed off however feel that mental status changes are related to metabolic changes.   - Updated spouse, Grant, as patient unable to provide information or participate. He expressed concerns regarding her difficulty hearing. Explained hearing loss typically does not cause mental status change and that patient's are usually able to interact through other means of communication.   - Expressed, \"It's a scary situation because she hasn't been able to talk in several days.\" Grant expressed again, \"I just want her to have everything done to find out " "what is causing this.\"  - Unsure he is completely understanding symptoms/behaviors his spouse is experiencing or prognostic awareness at current state.   - Plans to arrive to hospital around 4:15 PM to discuss further with attending.       Thank you for allowing us to participate in patient's plan of care. Palliative Care Team will continue to follow patient.     Time spent: 30 minutes spent reviewing medical and medication records, assessing and examining patient, discussing with family, answering questions, providing some guidance about a plan and documentation of care, and coordinating care with other healthcare members, with > 50% time spent face to face.     BERT White  4/12/2022      Electronically signed by Nneka Barton APRN at 04/12/22 1403     Amado Villarreal MD at 04/12/22 0942              [facility] Services  INPATIENT PROGRESS NOTE    Length of Stay: 8  Date of Admission: 4/2/2022  Primary Care Physician: David Lara MD    Subjective   Chief Complaint: Failure to thrive/metabolic acidosis    HPI   Neurology has signed off.  Nausea vomit resolved.  Blood pressure stable.  Afebrile.  Metabolic acidosis also resolved.    Review of Systems   Unable to obtain due to somnolence.  Patient no acute distress.    All pertinent negatives and positives are as above. All other systems have been reviewed and are negative unless otherwise stated.     Objective    Temp:  [96 °F (35.6 °C)-98.2 °F (36.8 °C)] 98 °F (36.7 °C)  Heart Rate:  [82-94] 86  Resp:  [16-20] 16  BP: (102-143)/(68-87) 143/82    Intake/Output Summary (Last 24 hours) at 4/12/2022 0943  Last data filed at 4/11/2022 2100  Gross per 24 hour   Intake 165.51 ml   Output 450 ml   Net -284.49 ml     Physical Exam  Vitals and nursing note reviewed.   Constitutional:       Appearance: She is well-developed.   HENT:      Head: Normocephalic.   Eyes:      Conjunctiva/sclera: Conjunctivae " normal.      Pupils: Pupils are equal, round, and reactive to light.   Neck:      Vascular: No JVD.   Cardiovascular:      Rate and Rhythm: Regular rhythm.  Regular rate.     Heart sounds: Normal heart sounds. No murmur heard.    No friction rub. No gallop.   Pulmonary:      Effort: No respiratory distress.      Breath sounds: No wheezing or rales.      Comments: Diminished breath sound bilateral, clear .  Chest:      Chest wall: No tenderness.   Abdominal:      General: Bowel sounds are normal. There is no distension.      Palpations: Abdomen is soft.      Tenderness: There is no abdominal tenderness. There is no guarding or rebound.   Musculoskeletal:         General: No tenderness or deformity.      Cervical back: Neck supple.   Skin:     General: Skin is warm and dry.      Capillary Refill: Capillary refill takes 2 to 3 seconds.      Findings: No rash.   Neurological:      Cranial Nerves: No cranial nerve deficit.      Motor: Weakness present. No abnormal muscle tone.      Coordination: Coordination abnormal.      Gait: Gait abnormal.      Deep Tendon Reflexes: Reflexes normal.   Psychiatric:       Patient refusing to talk.  Selective who she wants to talk to.    Results Review:  Lab Results (last 24 hours)     Procedure Component Value Units Date/Time    Basic Metabolic Panel [[id number]]  (Normal) Collected: 04/12/22 0622    Specimen: Blood Updated: 04/12/22 0732     Glucose 79 mg/dL      BUN 7 mg/dL      Creatinine 0.71 mg/dL      Sodium 141 mmol/L      Potassium 3.9 mmol/L      Chloride 105 mmol/L      CO2 24.0 mmol/L      Calcium 8.6 mg/dL      BUN/Creatinine Ratio 9.9     Anion Gap 12.0 mmol/L      eGFR 107.7 mL/min/1.73      Comment: National Kidney Foundation and American Society of Nephrology (ASN) Task Force recommended calculation based on the Chronic Kidney Disease Epidemiology Collaboration (CKD-EPI) equation refit without adjustment for race.       Narrative:      GFR Normal >60  Chronic Kidney  Disease <60  Kidney Failure <15      Iron Profile [[id number]]  (Abnormal) Collected: 04/12/22 0622    Specimen: Blood Updated: 04/12/22 0732     Iron 62 mcg/dL      Iron Saturation 64 %      Transferrin 65 mg/dL      TIBC 97 mcg/dL     CBC (No Diff) [[id number]]  (Abnormal) Collected: 04/12/22 0622    Specimen: Blood Updated: 04/12/22 0711     WBC 8.15 10*3/mm3      RBC 3.64 10*6/mm3      Hemoglobin 10.1 g/dL      Hematocrit 30.9 %      MCV 84.9 fL      MCH 27.7 pg      MCHC 32.7 g/dL      RDW 15.3 %      RDW-SD 47.4 fl      MPV 9.7 fL      Platelets 287 10*3/mm3     Folate [[id number]]  (Abnormal) Collected: 04/11/22 1746    Specimen: Blood Updated: 04/12/22 0211     Folate 4.00 ng/mL     Narrative:      Results may be falsely increased if patient taking Biotin.      Vitamin B12 [[id number]]  (Abnormal) Collected: 04/11/22 1746    Specimen: Blood Updated: 04/12/22 0211     Vitamin B-12 1,079 pg/mL     Narrative:      Results may be falsely increased if patient taking Biotin.      Ammonia [[id number]]  (Normal) Collected: 04/11/22 1746    Specimen: Blood Updated: 04/11/22 1806     Ammonia 20 umol/L     Magnesium [[id number]]  (Normal) Collected: 04/11/22 1746    Specimen: Blood Updated: 04/11/22 1800     Magnesium 1.8 mg/dL            Cultures:  No results found for: BLOODCX, URINECX, WOUNDCX, MRSACX, RESPCX, STOOLCX    Radiology Data:    Imaging Results (Last 24 Hours)     Procedure Component Value Units Date/Time    CT Head Without Contrast [[id number]] Collected: 04/11/22 1638     Updated: 04/11/22 1642    Narrative:      EXAMINATION: CT HEAD WO CONTRAST-      4/11/2022 4:09 PM CDT     HISTORY: AMS; E87.6-Hypokalemia; N39.0-Urinary tract infection, site not  specified; R31.9-Hematuria, unspecified; R11.2-Nausea with vomiting,  unspecified; E87.2-Acidosis     In order to have a CT radiation dose as low as reasonably achievable  Automated Exposure Control was utilized for adjustment of the mA and/or  KV according to  patient size.     DLP in mGycm= 566.     Axial, sagittal, and coronal noncontrast CT imaging of the head.     The visualized paranasal sinuses are clear.     The brain and ventricles have an age appropriate appearance.   There is no hemorrhage or mass-effect.   No acute infarction is seen.     No calvarial abnormality.       Impression:      1. No acute intracranial abnormality is seen.                                         This report was finalized on 04/11/2022 16:39 by Dr. Raúl Pan MD.          Allergies   Allergen Reactions   • Coconut Unknown - High Severity   • Nuts Unknown - High Severity   • Penicillins    • [place] Other (See Comments)     Causes migraines per pt reports       Scheduled meds:   bisacodyl, 10 mg, Rectal, Daily  FLUoxetine, 20 mg, Oral, Nightly  fluticasone, 2 spray, Each Nare, BID  folic acid, 1 mg, Oral, Daily  metoprolol succinate XL, 50 mg, Oral, Q24H  neomycin-polymyxin-hydrocortisone, 4 drop, Both Ears, Q6H  oxymetazoline, 2 spray, Nasal, BID  pantoprazole, 40 mg, Oral, BID AC  rivaroxaban, 20 mg, Oral, Nightly  senna-docusate sodium, 2 tablet, Oral, Daily  sodium bicarbonate, 650 mg, Oral, BID  sodium chloride, 10 mL, Intravenous, Q12H  SUMAtriptan, 50 mg, Oral, Once        PRN meds:  hydrALAZINE  •  labetalol  •  ondansetron **OR** ondansetron  •  sodium chloride  •  [COMPLETED] Insert peripheral IV **AND** sodium chloride  •  [COMPLETED] Insert peripheral IV **AND** sodium chloride  •  sodium chloride    Assessment/Plan       Intractable nausea and vomiting    Sepsis secondary to UTI (HCC)    Lactic acidosis    Hypokalemia    Essential (primary) hypertension    UTI (urinary tract infection), bacterial    Malfunction of nephrostomy tube (HCC)    Severe malnutrition (CMS/HCC)    Hypophosphatemia      Plan:  HPI.  Patient was admitted on 4/3 by Dr. Ricks.  Had COVID-19 here last year and ended up having an abdominal hematoma and had to go to [place].  Was there for a long time  and then an LTAC and then a skilled nursing facility.  Comes back with multidrug-resistant UTI on Invanz.   She presented with complaints of intractable nausea and vomiting.  Her medical problems started last August when she developed COVID-19 pneumonia and had numerous complications thereafter.  She developed an abdominal hematoma and had to go on dialysis secondary to extrinsic compression of her urinary system. She was transferred from here to [facility] and required exploratory laparotomy.  She ended up having bilateral percutaneous nephrostomy tubes and also ended up with a left ureteral stent.  She states that her main urologist is Dr. Rina Rey.  She states that she stopped producing urine from her percutaneous nephrostomy tube recently and that there are plans for it to be removed.  She states that she had gotten in touch with [facility] to see if someone here could do it so she would not have to travel.  When she left [facility], she spent considerable time at a skilled nursing facility in [place], [place].  She states that she has only been home a few weeks.  She was admitted to [facility] in [place] on 3/9 for electrolyte abnormalities.     UTI.  Renal function stable yesterday with a creatinine 0.76.  Dr. Ricks began treating a urinary tract infection with ceftriaxone.  She had a multidrug-resistant Klebsiella in October 2021.  I transitioned her to [facility] on 4/3.  Lactate down to 1.7 from 2.8 after fluids.  Urine does appear infected with too numerous to count white blood cells, 4+ bacteria and large leukocyte esterase. The sample was also bloody.  Urine culture has grown 2 distinct pathogens.  There is a multidrug-resistant Proteus mirabilis strain and an ESBL Klebsiella strain.  Invanz should be treating both of these organisms concurrently.  Today is day 5 of Invanz.  We will treat 7-10 days.  Lactic acidosis improved after fluids and antibiotics.  Procalcitonin  elevated at 0.31.  Dr. Dyer is familiar with her previous problems as he saw her last year.  I consulted him to evaluate her.  He wants to treat her current infection and then have her follow back at [place].  On Invanz.  Recommendation from urology-  Urology saw her and wants her to go back to [place] to have her percutaneous nephrostomy pulled.     Hypokalemia .    Resolved.     Sludge in gallbladder/dysfunctional gallbladder?.  General surgery consult.  GI consult. No surgery per general surgery.  CT scan abdomen pelvic-persistent mild but improved urothelial thickening involving the left renal pelvis and proximal left ureter- related to resolving UTI,  internal/external left-sided nephroureterostomy tube appears in satisfactory position, mild left-sided hydronephrosis which is decreased, Small amount gas is noted in the bladder,  No significant bladder wall thickening is identified, Small left pleural effusion- Increased hazy airspace opacities in the lung bases favored to represent diffuse atelectasis,  Based on the volume of gas within the GI tract- mild ileus may be present,  No evidence of bowel obstruction, Small amount fluid within the upper vagina where previously there was a small amount of gas, Hepatic steatosis.  HIDA scan-20% biliary ejection fraction.  Ultrasound abdomen pelvic-Sludge-filled gallbladder with no significant gallbladder wall thickening, pericholecystic fluid or convincing evidence of acute cholecystitis, Mild dilation of the common hepatic duct with no visualized choledocholithiasis, Hepatic steatosis.     Nausea/vomiting.  Nausea vomiting resolved.  DC Reglan.  Protonix.  Bicarb. DC Inapsine as needed.  DC Compazine as needed. DC Phenergan as needed.     Sinus congestion/sinus pain.  Flonase.  Corticosporin eardrop.  ENT consult.  Afrj.  Dr. Nichols saw her and has wanted to do an audiogram, but she has declined to go over to his office the last 2 days.   CTA chest-No  evidence of pulmonary embolus, Small LEFT pleural effusion, Bandlike areas of reticulation throughout both lungs likely representing scarring/fibrosis related to prior Covid 19 infection.      Hypertension/tachycardia.  Tachycardia resolved.   Cut back Toprol.     History of pulmonary embolus.  Hold Xarelto possible surgery.    DC Lovenox and put patient back on Xarelto 2022.     Anxiety/depression/insomnia . Decrease Prozac nightly due to somnolence.   DC Ambien as needed due to somnolence.  DC trazodone due to some.     Headaches/altered mental status.    DC Fioricet as needed due to somnolence.   Neurology signed off.  CT scan head-No acute intracranial abnormality is seen.  EEG-pending.     Pain.  DC Robaxin as needed due to somnolence. Morphine as needed.    Anemia.  Folate deficiency . p.o. folate.  No sign of acute bleed.  Hemoglobin stable.     Allergic rhinitis.  Flonase.     Nutrition.  Full liquid diet.       Deconditioning.  PT and OT consult.  Last time patient walk was in August of last year after Covid. Patient is mostly bedbound at the nursing home.     Blood culture-contamination with coagulase negative Staphylococcus.  Urine culture shows Proteus and Klebsiella.  Covid-19-negative.  Flu screen A and B-negative.     Discharge Plannin to 6 days.     agree with rehab placement in [place] or [place].  Family refused LTAC placement.    Electronically signed by Amado Villarreal MD, 22, 9:43 AM CDT.                    Electronically signed by Amado Villarreal MD at 22 [id number]

## 2022-04-15 ENCOUNTER — APPOINTMENT (OUTPATIENT)
Dept: CT IMAGING | Facility: HOSPITAL | Age: 47
End: 2022-04-15

## 2022-04-15 LAB
ALBUMIN SERPL-MCNC: 2 G/DL (ref 3.5–5.2)
ALBUMIN/GLOB SERPL: 0.8 G/DL
ALP SERPL-CCNC: 71 U/L (ref 39–117)
ALT SERPL W P-5'-P-CCNC: 11 U/L (ref 1–33)
ANION GAP SERPL CALCULATED.3IONS-SCNC: 14 MMOL/L (ref 5–15)
AST SERPL-CCNC: 22 U/L (ref 1–32)
BASOPHILS # BLD AUTO: 0.05 10*3/MM3 (ref 0–0.2)
BASOPHILS NFR BLD AUTO: 0.3 % (ref 0–1.5)
BILIRUB SERPL-MCNC: 0.3 MG/DL (ref 0–1.2)
BUN SERPL-MCNC: 16 MG/DL (ref 6–20)
BUN/CREAT SERPL: 7.4 (ref 7–25)
CALCIUM SPEC-SCNC: 7.9 MG/DL (ref 8.6–10.5)
CHLORIDE SERPL-SCNC: 102 MMOL/L (ref 98–107)
CO2 SERPL-SCNC: 21 MMOL/L (ref 22–29)
CREAT SERPL-MCNC: 2.15 MG/DL (ref 0.57–1)
DEPRECATED RDW RBC AUTO: 45.9 FL (ref 37–54)
EGFRCR SERPLBLD CKD-EPI 2021: 28.5 ML/MIN/1.73
EOSINOPHIL # BLD AUTO: 0.01 10*3/MM3 (ref 0–0.4)
EOSINOPHIL NFR BLD AUTO: 0.1 % (ref 0.3–6.2)
ERYTHROCYTE [DISTWIDTH] IN BLOOD BY AUTOMATED COUNT: 15.2 % (ref 12.3–15.4)
GLOBULIN UR ELPH-MCNC: 2.6 GM/DL
GLUCOSE SERPL-MCNC: 137 MG/DL (ref 65–99)
HCT VFR BLD AUTO: 21.4 % (ref 34–46.6)
HGB BLD-MCNC: 7.2 G/DL (ref 12–15.9)
IMM GRANULOCYTES # BLD AUTO: 0.29 10*3/MM3 (ref 0–0.05)
IMM GRANULOCYTES NFR BLD AUTO: 1.8 % (ref 0–0.5)
LYMPHOCYTES # BLD AUTO: 3.21 10*3/MM3 (ref 0.7–3.1)
LYMPHOCYTES NFR BLD AUTO: 20.5 % (ref 19.6–45.3)
MCH RBC QN AUTO: 28.1 PG (ref 26.6–33)
MCHC RBC AUTO-ENTMCNC: 33.6 G/DL (ref 31.5–35.7)
MCV RBC AUTO: 83.6 FL (ref 79–97)
MONOCYTES # BLD AUTO: 0.96 10*3/MM3 (ref 0.1–0.9)
MONOCYTES NFR BLD AUTO: 6.1 % (ref 5–12)
NEUTROPHILS NFR BLD AUTO: 11.17 10*3/MM3 (ref 1.7–7)
NEUTROPHILS NFR BLD AUTO: 71.2 % (ref 42.7–76)
NRBC BLD AUTO-RTO: 0.4 /100 WBC (ref 0–0.2)
PLATELET # BLD AUTO: 347 10*3/MM3 (ref 140–450)
PMV BLD AUTO: 10 FL (ref 6–12)
POTASSIUM SERPL-SCNC: 3.6 MMOL/L (ref 3.5–5.2)
PROT SERPL-MCNC: 4.6 G/DL (ref 6–8.5)
RBC # BLD AUTO: 2.56 10*6/MM3 (ref 3.77–5.28)
SODIUM SERPL-SCNC: 137 MMOL/L (ref 136–145)
WBC NRBC COR # BLD: 15.69 10*3/MM3 (ref 3.4–10.8)

## 2022-04-15 PROCEDURE — 99233 SBSQ HOSP IP/OBS HIGH 50: CPT

## 2022-04-15 PROCEDURE — 25010000002 VANCOMYCIN 10 G RECONSTITUTED SOLUTION: Performed by: FAMILY MEDICINE

## 2022-04-15 PROCEDURE — 25010000002 ENOXAPARIN PER 10 MG: Performed by: INTERNAL MEDICINE

## 2022-04-15 PROCEDURE — 92610 EVALUATE SWALLOWING FUNCTION: CPT | Performed by: SPEECH-LANGUAGE PATHOLOGIST

## 2022-04-15 PROCEDURE — 25010000002 IOPAMIDOL 61 % SOLUTION: Performed by: FAMILY MEDICINE

## 2022-04-15 PROCEDURE — 25010000002 ERTAPENEM PER 500 MG: Performed by: FAMILY MEDICINE

## 2022-04-15 PROCEDURE — 80053 COMPREHEN METABOLIC PANEL: CPT | Performed by: FAMILY MEDICINE

## 2022-04-15 PROCEDURE — 85025 COMPLETE CBC W/AUTO DIFF WBC: CPT | Performed by: FAMILY MEDICINE

## 2022-04-15 PROCEDURE — 74177 CT ABD & PELVIS W/CONTRAST: CPT

## 2022-04-15 RX ADMIN — FLUTICASONE PROPIONATE 2 SPRAY: 50 SPRAY, METERED NASAL at 08:56

## 2022-04-15 RX ADMIN — NEOMYCIN SULFATE, POLYMYXIN B SULFATE AND HYDROCORTISONE 4 DROP: 10; 3.5; 1 SUSPENSION/ DROPS AURICULAR (OTIC) at 23:32

## 2022-04-15 RX ADMIN — FAMOTIDINE 20 MG: 10 INJECTION INTRAVENOUS at 23:30

## 2022-04-15 RX ADMIN — Medication 10 ML: at 23:33

## 2022-04-15 RX ADMIN — Medication 2 SPRAY: at 23:31

## 2022-04-15 RX ADMIN — BISACODYL 10 MG: 10 SUPPOSITORY RECTAL at 08:56

## 2022-04-15 RX ADMIN — FLUTICASONE PROPIONATE 2 SPRAY: 50 SPRAY, METERED NASAL at 23:31

## 2022-04-15 RX ADMIN — VANCOMYCIN HYDROCHLORIDE 1250 MG: 10 INJECTION, POWDER, LYOPHILIZED, FOR SOLUTION INTRAVENOUS at 23:30

## 2022-04-15 RX ADMIN — Medication 10 ML: at 08:57

## 2022-04-15 RX ADMIN — NEOMYCIN SULFATE, POLYMYXIN B SULFATE AND HYDROCORTISONE 4 DROP: 10; 3.5; 1 SUSPENSION/ DROPS AURICULAR (OTIC) at 12:31

## 2022-04-15 RX ADMIN — ENOXAPARIN SODIUM 70 MG: 100 INJECTION SUBCUTANEOUS at 06:06

## 2022-04-15 RX ADMIN — SODIUM CHLORIDE, POTASSIUM CHLORIDE, SODIUM LACTATE AND CALCIUM CHLORIDE 75 ML/HR: 600; 310; 30; 20 INJECTION, SOLUTION INTRAVENOUS at 23:43

## 2022-04-15 RX ADMIN — FAMOTIDINE 20 MG: 10 INJECTION INTRAVENOUS at 08:56

## 2022-04-15 RX ADMIN — NEOMYCIN SULFATE, POLYMYXIN B SULFATE AND HYDROCORTISONE 4 DROP: 10; 3.5; 1 SUSPENSION/ DROPS AURICULAR (OTIC) at 17:49

## 2022-04-15 RX ADMIN — Medication 2 SPRAY: at 08:56

## 2022-04-15 RX ADMIN — NEOMYCIN SULFATE, POLYMYXIN B SULFATE AND HYDROCORTISONE 4 DROP: 10; 3.5; 1 SUSPENSION/ DROPS AURICULAR (OTIC) at 02:24

## 2022-04-15 RX ADMIN — SODIUM CHLORIDE, POTASSIUM CHLORIDE, SODIUM LACTATE AND CALCIUM CHLORIDE 75 ML/HR: 600; 310; 30; 20 INJECTION, SOLUTION INTRAVENOUS at 04:08

## 2022-04-15 RX ADMIN — NEOMYCIN SULFATE, POLYMYXIN B SULFATE AND HYDROCORTISONE 4 DROP: 10; 3.5; 1 SUSPENSION/ DROPS AURICULAR (OTIC) at 06:06

## 2022-04-15 RX ADMIN — IOPAMIDOL 100 ML: 612 INJECTION, SOLUTION INTRAVENOUS at 20:55

## 2022-04-15 RX ADMIN — ERTAPENEM SODIUM 1 G: 1 INJECTION, POWDER, LYOPHILIZED, FOR SOLUTION INTRAMUSCULAR; INTRAVENOUS at 15:49

## 2022-04-15 RX ADMIN — METOPROLOL SUCCINATE 50 MG: 50 TABLET, FILM COATED, EXTENDED RELEASE ORAL at 08:56

## 2022-04-15 RX ADMIN — ENOXAPARIN SODIUM 70 MG: 100 INJECTION SUBCUTANEOUS at 17:49

## 2022-04-15 RX ADMIN — SODIUM BICARBONATE 650 MG: 650 TABLET ORAL at 08:56

## 2022-04-15 NOTE — PROGRESS NOTES
[facility] Medicine Services  INPATIENT PROGRESS NOTE    Length of Stay: 11  Date of Admission: 4/2/2022  Primary Care Physician: David Lara MD    Subjective   Chief Complaint: Failure to thrive/metabolic acidosis    HPI   Recurrent UTI, saw consult infectious disease, Mojica cath was placed to possible refluxing causing recurrent infection per [place] [facility].  Blood pressure stable, afebrile.  Still tacky.    Review of Systems   Patient is arousable, refusing to talk.  No sign of acute distress.    All pertinent negatives and positives are as above. All other systems have been reviewed and are negative unless otherwise stated.     Objective    Temp:  [99 °F (37.2 °C)-99.5 °F (37.5 °C)] 99.5 °F (37.5 °C)  Heart Rate:  [111-118] 114  Resp:  [18-20] 20  BP: ()/(66-89) 104/68    Intake/Output Summary (Last 24 hours) at 4/15/2022 1519  Last data filed at 4/15/2022 0811  Gross per 24 hour   Intake 1030 ml   Output --   Net 1030 ml     Physical Exam  Constitutional:       Appearance: She is well-developed.   HENT:      Head: Normocephalic.   Eyes:      Conjunctiva/sclera: Conjunctivae normal.      Pupils: Pupils are equal, round, and reactive to light.   Neck:      Vascular: No JVD.   Cardiovascular:      Rate and Rhythm: Regular rhythm.  Rates in the 100 .     Heart sounds: Normal heart sounds. No murmur heard.    No friction rub. No gallop.   Pulmonary:      Effort: No respiratory distress.      Breath sounds: No wheezing or rales.      Comments: Diminished breath sound bilateral, clear .  Chest:      Chest wall: No tenderness.   Abdominal:      General: Bowel sounds are normal. There is no distension.      Palpations: Abdomen is soft.      Tenderness: There is no abdominal tenderness. There is no guarding or rebound.   Musculoskeletal:         General: No tenderness or deformity.      Cervical back: Neck supple.   Skin:     General: Skin is warm and dry.       Capillary Refill: Capillary refill takes 2 to 3 seconds.      Findings: No rash.   Neurological:      Cranial Nerves: No cranial nerve deficit.      Motor: Weakness present. No abnormal muscle tone.      Coordination: Coordination abnormal.      Gait: Gait abnormal.      Deep Tendon Reflexes: Reflexes normal.   Results Review:  Lab Results (last 24 hours)     Procedure Component Value Units Date/Time    Urine Culture - Urine, Urine, Catheter In/Out [[id number]]  (Abnormal) Collected: 04/14/22 1025    Specimen: Urine, Catheter In/Out Updated: 04/15/22 1311     Urine Culture >100,000 CFU/mL Gram Negative Bacilli    Comprehensive Metabolic Panel [[id number]] Updated: 04/15/22 1300    Specimen: Blood     CBC & Differential [[id number]]  (Abnormal) Collected: 04/15/22 1150    Specimen: Blood Updated: 04/15/22 1207    Narrative:      The following orders were created for panel order CBC & Differential.  Procedure                               Abnormality         Status                     ---------                               -----------         ------                     CBC Auto Differential[[id number]]        Abnormal            Final result                 Please view results for these tests on the individual orders.    CBC Auto Differential [[id number]]  (Abnormal) Collected: 04/15/22 1150    Specimen: Blood Updated: 04/15/22 1207     WBC 15.69 10*3/mm3      RBC 2.56 10*6/mm3      Hemoglobin 7.2 g/dL      Hematocrit 21.4 %      MCV 83.6 fL      MCH 28.1 pg      MCHC 33.6 g/dL      RDW 15.2 %      RDW-SD 45.9 fl      MPV 10.0 fL      Platelets 347 10*3/mm3      Neutrophil % 71.2 %      Lymphocyte % 20.5 %      Monocyte % 6.1 %      Eosinophil % 0.1 %      Basophil % 0.3 %      Immature Grans % 1.8 %      Neutrophils, Absolute 11.17 10*3/mm3      Lymphocytes, Absolute 3.21 10*3/mm3      Monocytes, Absolute 0.96 10*3/mm3      Eosinophils, Absolute 0.01 10*3/mm3      Basophils, Absolute 0.05 10*3/mm3      Immature  Grans, Absolute 0.29 10*3/mm3      nRBC 0.4 /100 WBC     Blood Culture With AASHISH - Blood, Arm, Right [[id number]]  (Normal) Collected: 04/14/22 1938    Specimen: Blood from Arm, Right Updated: 04/15/22 0817     Blood Culture No growth at less than 24 hours           Cultures:  Blood Culture   Date Value Ref Range Status   04/14/2022 No growth at less than 24 hours  Preliminary     Urine Culture   Date Value Ref Range Status   04/14/2022 >100,000 CFU/mL Gram Negative Bacilli (A)  Preliminary       Radiology Data:    Imaging Results (Last 24 Hours)     Procedure Component Value Units Date/Time    XR Abdomen KUB [[id number]] Collected: 04/14/22 1804     Updated: 04/14/22 1811    Narrative:      XR ABDOMEN KUB- 4/14/2022 5:56 PM CDT     HISTORY: ng placement; E87.6-Hypokalemia; N39.0-Urinary tract infection,  site not specified; R31.9-Hematuria, unspecified; R11.2-Nausea with  vomiting, unspecified; E87.2-Acidosis       COMPARISON: April 14, 2022 at 4:33 PM     FINDINGS:  There is a nonspecific bowel gas pattern. Dobbhoff tube is present  coiled in the fundus the stomach satisfactorily position. A left-sided  nephrostomy catheter is present..     No acute skeletal abnormality is identified.        Impression:      1. Dobbhoff feeding tube satisfactorily position in the fundus of  stomach..         This report was finalized on 04/14/2022 18:08 by Dr. Nadeem Marshall MD.    XR Abdomen KUB [[id number]] Collected: 04/14/22 1650     Updated: 04/14/22 1654    Narrative:      SINGLE VIEW ABDOMEN        4/14/2022      HISTORY: ng placement; E87.6-Hypokalemia; N39.0-Urinary tract infection,  site not specified; R31.9-Hematuria, unspecified; R11.2-Nausea with  vomiting, unspecified; E87.2-Acidosis     FINDINGS: Weighted feeding tube with tip curled back into the gastric  fundus.     There is a left-sided internal/external nephrostomy tube. The lung bases  are clear. Moderate gastric distention. Large volume colonic stool with  gaseous  distention of colon. No gross intraperitoneal free air.       Impression:      Feeding tube tip located in the gastric fundus.     This report was finalized on 04/14/2022 16:51 by Dr Rob Ribeiro, .          Allergies   Allergen Reactions   • Coconut Unknown - High Severity   • Nuts Unknown - High Severity   • Penicillins    • [place] Other (See Comments)     Causes migraines per pt reports       Scheduled meds:   bisacodyl, 10 mg, Rectal, Daily  enoxaparin, 1 mg/kg, Subcutaneous, Q12H  ertapenem, 1 g, Intravenous, Q24H  famotidine, 20 mg, Intravenous, Q12H  FLUoxetine, 20 mg, Oral, Nightly  fluticasone, 2 spray, Each Nare, BID  folic acid, 1 mg, Oral, Daily  metoprolol succinate XL, 50 mg, Oral, Q24H  neomycin-polymyxin-hydrocortisone, 4 drop, Both Ears, Q6H  oxymetazoline, 2 spray, Nasal, BID  senna-docusate sodium, 2 tablet, Oral, Daily  sodium bicarbonate, 650 mg, Oral, BID  sodium chloride, 10 mL, Intravenous, Q12H  SUMAtriptan, 50 mg, Oral, Once        PRN meds:  hydrALAZINE  •  labetalol  •  ondansetron **OR** ondansetron  •  Pharmacy to Dose enoxaparin (LOVENOX)  •  sodium chloride  •  [COMPLETED] Insert peripheral IV **AND** sodium chloride  •  [COMPLETED] Insert peripheral IV **AND** sodium chloride  •  sodium chloride    Assessment/Plan       Intractable nausea and vomiting    Sepsis secondary to UTI (HCC)    Lactic acidosis    Hypokalemia    Essential (primary) hypertension    UTI (urinary tract infection), bacterial    Malfunction of nephrostomy tube (HCC)    Severe malnutrition (CMS/HCC)    Hypophosphatemia      Plan:  HPI.  Patient was admitted on 4/3 by Dr. Ricks.  Had COVID-19 here last year and ended up having an abdominal hematoma and had to go to [facility].  Was there for a long time and then an LTAC and then a skilled nursing facility.  Comes back with multidrug-resistant UTI on Invanz.   She presented with complaints of intractable nausea and vomiting.  Her medical problems started last  August when she developed COVID-19 pneumonia and had numerous complications thereafter.  She developed an abdominal hematoma and had to go on dialysis secondary to extrinsic compression of her urinary system. She was transferred from here to [facility] and required exploratory laparotomy.  She ended up having bilateral percutaneous nephrostomy tubes and also ended up with a left ureteral stent.  She states that her main urologist is Dr. Rina Rey.  She states that she stopped producing urine from her percutaneous nephrostomy tube recently and that there are plans for it to be removed.  She states that she had gotten in touch with [place] to see if someone here could do it so she would not have to travel.  When she left [place], she spent considerable time at a skilled nursing facility in [place], [place].  She states that she has only been home a few weeks.  She was admitted to [facility] [place], operated by [facility] in [place] on 3/9 for electrolyte abnormalities.     UTI.  Renal function stable yesterday with a creatinine 0.76.  Dr. Ricks began treating a urinary tract infection with ceftriaxone.  She had a multidrug-resistant Klebsiella in October 2021.  I transitioned her to [facility] on 4/3.  Lactate down to 1.7 from 2.8 after fluids.  Urine does appear infected with too numerous to count white blood cells, 4+ bacteria and large leukocyte esterase. The sample was also bloody.  Urine culture has grown 2 distinct pathogens.  There is a multidrug-resistant Proteus mirabilis strain and an ESBL Klebsiella strain.  Invanz should be treating both of these organisms concurrently.  Today is day 5 of [facility].  We will treat 7-10 days.  Lactic acidosis improved after fluids and antibiotics.  Procalcitonin elevated at 0.31.  Dr. Dyer is familiar with her previous problems as he saw her last year.  I consulted him to evaluate her.  He wants to treat her current infection and then have her follow back at  [place].  On Invanz.   Recommendation from urology-  Urology saw her and wants her to go back to [place] to have her percutaneous nephrostomy pulled.  Long discussion with Dr. Haro yesterday urology at [facility], covering for Dr. Rey.  Recommended for infectious disease consult and Stratton cath placement.  Discussed with transfer line this morning saying that Dr. Rey will call me today to reevaluated.  Neurology believes his refluxing causing recurrent infection, and recommended stratton cath placement. PCNU, stent place in [facility].      Hypokalemia .  P.o. potassium..  Magnesium-normal.     Sludge in gallbladder/dysfunctional gallbladder?.  General surgery consult.  GI consult. No surgery per general surgery.  CT scan abdomen pelvic-persistent mild but improved urothelial thickening involving the left renal pelvis and proximal left ureter- related to resolving UTI,  internal/external left-sided nephroureterostomy tube appears in satisfactory position, mild left-sided hydronephrosis which is decreased, Small amount gas is noted in the bladder,  No significant bladder wall thickening is identified, Small left pleural effusion- Increased hazy airspace opacities in the lung bases favored to represent diffuse atelectasis,  Based on the volume of gas within the GI tract- mild ileus may be present,  No evidence of bowel obstruction, Small amount fluid within the upper vagina where previously there was a small amount of gas, Hepatic steatosis.  HIDA scan-20% biliary ejection fraction.  Ultrasound abdomen pelvic-Sludge-filled gallbladder with no significant gallbladder wall thickening, pericholecystic fluid or convincing evidence of acute cholecystitis, Mild dilation of the common hepatic duct with no visualized choledocholithiasis, Hepatic steatosis.     Metabolic acidosis.  Bicarb drip.  P.o. bicarb.     Nausea/vomiting.  Nausea vomiting resolved.  DC Reglan.  Protonix.  Bicarb. DC Inapsine as needed.  DC Compazine as  needed. DC Phenergan as needed.     Sinus congestion/sinus pain.  Flonase.  Corticosporin eardrop.  ENT consult.  Afrin.  Dr. Nichols saw her and has wanted to do an audiogram, but she has declined to go over to his office the last 2 days.   CTA chest-No evidence of pulmonary embolus, Small LEFT pleural effusion, Bandlike areas of reticulation throughout both lungs likely representing scarring/fibrosis related to prior Covid 19 infection.      Hypertension/tachycardia.  Tachycardia resolved.   Cut back Toprol.     History of pulmonary embolus.  Hold Xarelto possible surgery.    DC Lovenox and put patient back on Xarelto 2022.     Anxiety/depression/insomnia . Decrease Prozac nightly due to somnolence.   DC Ambien as needed due to somnolence.  DC trazodone due to some.     Headaches/altered mental status.    DC Fioricet as needed due to somnolence.   Neurology signed off.  CT scan head-No acute intracranial abnormality is seen.  EEG-pending.     Pain.  DC Robaxin as needed due to somnolence. Morphine as needed.      Anemia.  Folate deficiency . p.o. folate.  No sign of acute bleed.  Hemoglobin worsening.     Allergic rhinitis.  Flonase.     Nutrition.  Start NG tube feeding.     Deconditioning.  PT and OT consult.  Last time patient walk was in August of last year after Covid. Patient is mostly bedbound at the nursing home.     Blood culture pending.   Covid-19-negative.  Flu screen A and B-negative.  Urine culture gram-negative bacilli.     Discharge Plannin to 6 days.   Long discussion with Dr. Haro urology at [facility] yesterday, covering for Dr. Rey.  Recommended for infectious disease consult and Mojica cath placement.  Discussed with transplant this morning saying that Dr. Rey will call me today to be evaluated.        Electronically signed by Amado Villarreal MD, 04/15/22, 3:19 PM CDT.

## 2022-04-15 NOTE — PLAN OF CARE
Goal Outcome Evaluation:  Plan of Care Reviewed With: patient           Outcome Evaluation: Patient is Non-verbal, does nmot follow commands, opens eyes at times, stratton catheter placed with thick pink tinged urine returned, Dobbhoff remains in place with feeding increased every 6 hrs by 10 for a goal rate of 80, currently at 35, turned and repositioned every 2 hrs., meds given per dobbhoff, Tele running ST, , Safety maintained, will continue to monitor.

## 2022-04-15 NOTE — CONSULTS
"INFECTIOUS DISEASES CONSULT NOTE    Patient:  Namita Zabala 46 y.o. female  ROOM # 346/1  YOB: 1975  MRN: [id number]  [facility]:  [id number]  Admit date: 4/2/2022   Admitting Physician: Amado Villarreal MD  Primary Care Physician: David Lara MD  REFERRING PROVIDER: Frank Ricks MD      REASON FOR CONSULTATION : recurrent infection      HISTORY OF PRESENT ILLNESS: The patient is a 46-year-old female with a complicated past medical history who was admitted 13 days ago with a chief complaint of nausea and vomiting.  Per review of records she had COVID-19 in August of last year complicated by pulmonary emboli and subsequent abdominal hematoma.    Abdominal hematoma apparently became superinfected and the patient had necrotizing bladder infection.  She underwent complex bladder repair at [place]..    She apparently developed obstruction of ureters and had temporary hemodialysis.  She also had to have nephrostomy tubes placed.    The patient had been in the LTACH at some point as well as a skilled nursing facility.  Apparently plan was for patient to undergo cystoscopy and possible internalization of the left stent per [place] u[facility].    She was seen in consultation by Dr. Alberto with urology for \"recurrent UTI, history of pelvic hematoma, history of necrotizing intrapelvic/intra-abdominal infection with bladder and distal left ureteral involvement, indwelling left percutaneous nephroureteral stent\"    Patient was also seen by GI as well given her intractable nausea and vomiting.    Currently, the patient is somnolent, she pulled out her Dobbhoff tube 4 times yesterday according to nursing and had to have soft restraints placed.    Notes indicate Dr. Villarreal is trying to the patient transferred to [place].      Past Medical History:   Diagnosis Date   • Angina at rest (HCC)    • Migraine     Sees Pain Management for injections.    • MVP (mitral valve prolapse)    • Renal " disorder    • Syncope      Past Surgical History:   Procedure Laterality Date   • BREAST BIOPSY Right 2011    benign   • INSERTION HEMODIALYSIS CATHETER Left 9/16/2021    Procedure: HEMODIALYSIS CATHETER INSERTION;  Surgeon: Anders Kaur MD;  Location: [place] [address];  Service: Vascular;  Laterality: Left;   • TONSILLECTOMY       Family History   Problem Relation Age of Onset   • Colon cancer Maternal Aunt 52   • Fibromyalgia Mother    • Heart disease Mother    • Hypertension Mother    • Hodgkin's lymphoma Paternal Grandmother    • Ovarian cancer Neg Hx    • Breast cancer Neg Hx      Social History     Socioeconomic History   • Marital status:    Tobacco Use   • Smoking status: Never Smoker   • Smokeless tobacco: Never Used   Substance and Sexual Activity   • Alcohol use: No   • Drug use: No       Current Scheduled Medications:   bisacodyl, 10 mg, Rectal, Daily  enoxaparin, 1 mg/kg, Subcutaneous, Q12H  ertapenem, 1 g, Intravenous, Q24H  famotidine, 20 mg, Intravenous, Q12H  FLUoxetine, 20 mg, Oral, Nightly  fluticasone, 2 spray, Each Nare, BID  folic acid, 1 mg, Oral, Daily  metoprolol succinate XL, 50 mg, Oral, Q24H  neomycin-polymyxin-hydrocortisone, 4 drop, Both Ears, Q6H  oxymetazoline, 2 spray, Nasal, BID  senna-docusate sodium, 2 tablet, Oral, Daily  sodium bicarbonate, 650 mg, Oral, BID  sodium chloride, 10 mL, Intravenous, Q12H  SUMAtriptan, 50 mg, Oral, Once      Current PRN Medications:  hydrALAZINE  •  labetalol  •  ondansetron **OR** ondansetron  •  Pharmacy to Dose enoxaparin (LOVENOX)  •  sodium chloride  •  [COMPLETED] Insert peripheral IV **AND** sodium chloride  •  [COMPLETED] Insert peripheral IV **AND** sodium chloride  •  sodium chloride       Allergies   Allergen Reactions   • Coconut Unknown - High Severity   • Nuts Unknown - High Severity   • Penicillins    • [place] Other (See Comments)     Causes migraines per pt reports       Review of Systems   Unable to perform ROS:  "Patient nonverbal         Vital Signs:  /68 (BP Location: Left arm, Patient Position: Lying)   Pulse 114   Temp 99.5 °F (37.5 °C) (Axillary)   Resp 20   Ht 170.2 cm (67\")   Wt 68 kg (150 lb)   SpO2 100%   BMI 23.49 kg/m²   Temp (24hrs), Av °F (37.2 °C), Min:97.9 °F (36.6 °C), Max:99.5 °F (37.5 °C)      Physical Exam   General: Patient is a 46-year-old female lying in bed in no acute distress  HEENT: Sclera anicteric and noninjected.  Dobbhoff in place and near  Heart: S1-S2 rate is around 110  Lungs fairly clear anteriorly.  Patient had hiccups intermittently  Abdomen: Soft, bowel sounds positive, wound fairly well-healed appears by secondary intention.  There is a small opening in a Mepilex in place covering that without any significant drainage on it  Upper extremities and soft restraints  Neuro: Patient would open her eyes to voice.  She did not squeeze hands to command.  She did moan some when she did have hiccups  Psych: She was not agitated        Results Review:    I reviewed the patient's new clinical results.    Lab Results:  CBC:   Lab Results   Lab 22  1128 22  1116 04/15/22  1150   WBC 19.48* 10.54 15.69*   HEMOGLOBIN 11.1* 9.9* 7.2*   HEMATOCRIT 32.7* 29.6* 21.4*   PLATELETS 373 354 347       CMP:   Lab Results   Lab 22  0814 04/10/22  0920 22  0752 22  0622 22  0725 22  1116   SODIUM 136   < > 139 141 134* 137   POTASSIUM 4.3   < > 3.3* 3.9 3.8 3.4*   CHLORIDE 106   < > 106 105 99 99   CO2 17.0*   < > 23.0 24.0 18.0* 25.0   BUN 8   < > 7 7 8 7   CREATININE 0.78   < > 0.70 0.71 0.70 0.72   CALCIUM 8.9   < > 8.3* 8.6 8.9 8.8   BILIRUBIN 0.4  --  0.4  --   --  0.5   ALK PHOS 99  --  87  --   --  111   ALT (SGPT) 10  --  9  --   --  13   AST (SGOT) 18  --  20  --   --  21   GLUCOSE 72   < > 102* 79 83 110*    < > = values in this interval not displayed.       Lab Results (last 72 hours)     Procedure Component Value Units Date/Time    Urine Culture " - Urine, Urine, Catheter In/Out [[id number]]  (Abnormal) Collected: 04/14/22 1025    Specimen: Urine, Catheter In/Out Updated: 04/15/22 1311     Urine Culture >100,000 CFU/mL Gram Negative Bacilli    Comprehensive Metabolic Panel [[id number]] Updated: 04/15/22 1300    Specimen: Blood     CBC & Differential [[id number]]  (Abnormal) Collected: 04/15/22 1150    Specimen: Blood Updated: 04/15/22 1207    Narrative:      The following orders were created for panel order CBC & Differential.  Procedure                               Abnormality         Status                     ---------                               -----------         ------                     CBC Auto Differential[[id number]]        Abnormal            Final result                 Please view results for these tests on the individual orders.    CBC Auto Differential [[id number]]  (Abnormal) Collected: 04/15/22 1150    Specimen: Blood Updated: 04/15/22 1207     WBC 15.69 10*3/mm3      RBC 2.56 10*6/mm3      Hemoglobin 7.2 g/dL      Hematocrit 21.4 %      MCV 83.6 fL      MCH 28.1 pg      MCHC 33.6 g/dL      RDW 15.2 %      RDW-SD 45.9 fl      MPV 10.0 fL      Platelets 347 10*3/mm3      Neutrophil % 71.2 %      Lymphocyte % 20.5 %      Monocyte % 6.1 %      Eosinophil % 0.1 %      Basophil % 0.3 %      Immature Grans % 1.8 %      Neutrophils, Absolute 11.17 10*3/mm3      Lymphocytes, Absolute 3.21 10*3/mm3      Monocytes, Absolute 0.96 10*3/mm3      Eosinophils, Absolute 0.01 10*3/mm3      Basophils, Absolute 0.05 10*3/mm3      Immature Grans, Absolute 0.29 10*3/mm3      nRBC 0.4 /100 WBC     Blood Culture With AASHISH - Blood, Arm, Right [[id number]]  (Normal) Collected: 04/14/22 1938    Specimen: Blood from Arm, Right Updated: 04/15/22 0817     Blood Culture No growth at less than 24 hours    Comprehensive Metabolic Panel [[id number]]  (Abnormal) Collected: 04/14/22 1116    Specimen: Blood Updated: 04/14/22 1232     Glucose 110 mg/dL      BUN 7 mg/dL       Creatinine 0.72 mg/dL      Sodium 137 mmol/L      Potassium 3.4 mmol/L      Comment: Slight hemolysis detected by analyzer. Results may be affected.        Chloride 99 mmol/L      CO2 25.0 mmol/L      Calcium 8.8 mg/dL      Total Protein 6.0 g/dL      Albumin 2.70 g/dL      ALT (SGPT) 13 U/L      AST (SGOT) 21 U/L      Comment: Slight hemolysis detected by analyzer. Results may be affected.        Alkaline Phosphatase 111 U/L      Total Bilirubin 0.5 mg/dL      Globulin 3.3 gm/dL      A/G Ratio 0.8 g/dL      BUN/Creatinine Ratio 9.7     Anion Gap 13.0 mmol/L      eGFR 105.9 mL/min/1.73      Comment: National Kidney Foundation and American Society of Nephrology (ASN) Task Force recommended calculation based on the Chronic Kidney Disease Epidemiology Collaboration (CKD-EPI) equation refit without adjustment for race.       Narrative:      GFR Normal >60  Chronic Kidney Disease <60  Kidney Failure <15      Protime-INR [[id number]]  (Abnormal) Collected: 04/14/22 1116    Specimen: Blood Updated: 04/14/22 1219     Protime 17.0 Seconds      INR 1.44    CBC & Differential [[id number]]  (Abnormal) Collected: 04/14/22 1116    Specimen: Blood Updated: 04/14/22 1213    Narrative:      The following orders were created for panel order CBC & Differential.  Procedure                               Abnormality         Status                     ---------                               -----------         ------                     CBC Auto Differential[[id number]]        Abnormal            Final result                 Please view results for these tests on the individual orders.    CBC Auto Differential [[id number]]  (Abnormal) Collected: 04/14/22 1116    Specimen: Blood Updated: 04/14/22 1213     WBC 10.54 10*3/mm3      RBC 3.54 10*6/mm3      Hemoglobin 9.9 g/dL      Hematocrit 29.6 %      MCV 83.6 fL      MCH 28.0 pg      MCHC 33.4 g/dL      RDW 14.9 %      RDW-SD 45.7 fl      MPV 9.8 fL      Platelets 354 10*3/mm3      Neutrophil %  77.6 %      Lymphocyte % 14.7 %      Monocyte % 4.5 %      Eosinophil % 1.7 %      Basophil % 0.6 %      Immature Grans % 0.9 %      Neutrophils, Absolute 8.19 10*3/mm3      Lymphocytes, Absolute 1.55 10*3/mm3      Monocytes, Absolute 0.47 10*3/mm3      Eosinophils, Absolute 0.18 10*3/mm3      Basophils, Absolute 0.06 10*3/mm3      Immature Grans, Absolute 0.09 10*3/mm3      nRBC 0.2 /100 WBC     Urinalysis, Microscopic Only - Urine, Catheter In/Out [[id number]]  (Abnormal) Collected: 04/14/22 1025    Specimen: Urine, Catheter In/Out Updated: 04/14/22 1050     RBC, UA 0-2 /HPF      WBC, UA Too Numerous to Count /HPF      Bacteria, UA 4+ /HPF      Squamous Epithelial Cells, UA None Seen /HPF      Hyaline Casts, UA None Seen /LPF      Methodology Manual Light Microscopy    Urinalysis With Culture If Indicated - Urine, Catheter In/Out [[id number]]  (Abnormal) Collected: 04/14/22 1025    Specimen: Urine, Catheter In/Out Updated: 04/14/22 1040     Color, UA Yellow     Appearance, UA Cloudy     pH, UA >=9.0     Specific Gravity, UA 1.007     Glucose, UA Negative     Ketones, UA Trace     Bilirubin, UA Negative     Blood, UA Large (3+)     Protein, UA Trace     Leuk Esterase, UA Large (3+)     Nitrite, UA Negative     Urobilinogen, UA 0.2 E.U./dL    CBC (No Diff) [[id number]]  (Abnormal) Collected: 04/13/22 1128    Specimen: Blood Updated: 04/13/22 1140     WBC 19.48 10*3/mm3      RBC 3.97 10*6/mm3      Hemoglobin 11.1 g/dL      Hematocrit 32.7 %      MCV 82.4 fL      MCH 28.0 pg      MCHC 33.9 g/dL      RDW 15.1 %      RDW-SD 45.1 fl      MPV 9.8 fL      Platelets 373 10*3/mm3     Basic Metabolic Panel [[id number]]  (Abnormal) Collected: 04/13/22 0725    Specimen: Blood Updated: 04/13/22 0752     Glucose 83 mg/dL      BUN 8 mg/dL      Creatinine 0.70 mg/dL      Sodium 134 mmol/L      Potassium 3.8 mmol/L      Comment: Slight hemolysis detected by analyzer. Results may be affected.        Chloride 99 mmol/L      CO2 18.0  mmol/L      Calcium 8.9 mg/dL      BUN/Creatinine Ratio 11.4     Anion Gap 17.0 mmol/L      eGFR 109.5 mL/min/1.73      Comment: National Kidney Foundation and American Society of Nephrology (ASN) Task Force recommended calculation based on the Chronic Kidney Disease Epidemiology Collaboration (CKD-EPI) equation refit without adjustment for race.       Narrative:      GFR Normal >60  Chronic Kidney Disease <60  Kidney Failure <15            Estimated Creatinine Clearance: 107 mL/min (by C-G formula based on SCr of 0.72 mg/dL).    Culture Results:    Microbiology Results (last 10 days)     Procedure Component Value - Date/Time    Blood Culture With AASHISH - Blood, Arm, Right [[id number]]  (Normal) Collected: 04/14/22 1938    Lab Status: Preliminary result Specimen: Blood from Arm, Right Updated: 04/15/22 0817     Blood Culture No growth at less than 24 hours    Urine Culture - Urine, Urine, Catheter In/Out [[id number]]  (Abnormal) Collected: 04/14/22 1025    Lab Status: Preliminary result Specimen: Urine, Catheter In/Out Updated: 04/15/22 1311     Urine Culture >100,000 CFU/mL Gram Negative Bacilli             Radiology:   Imaging Results (Last 72 Hours)     Procedure Component Value Units Date/Time    XR Abdomen KUB [[id number]] Collected: 04/14/22 1804     Updated: 04/14/22 1811    Narrative:      XR ABDOMEN KUB- 4/14/2022 5:56 PM CDT     HISTORY: ng placement; E87.6-Hypokalemia; N39.0-Urinary tract infection,  site not specified; R31.9-Hematuria, unspecified; R11.2-Nausea with  vomiting, unspecified; E87.2-Acidosis       COMPARISON: April 14, 2022 at 4:33 PM     FINDINGS:  There is a nonspecific bowel gas pattern. Dobbhoff tube is present  coiled in the fundus the stomach satisfactorily position. A left-sided  nephrostomy catheter is present..     No acute skeletal abnormality is identified.        Impression:      1. Dobbhoff feeding tube satisfactorily position in the fundus of  stomach..         This report was  finalized on 04/14/2022 18:08 by Dr. Nadeem Marshall MD.    XR Abdomen KUB [[id number]] Collected: 04/14/22 1650     Updated: 04/14/22 1654    Narrative:      SINGLE VIEW ABDOMEN        4/14/2022      HISTORY: ng placement; E87.6-Hypokalemia; N39.0-Urinary tract infection,  site not specified; R31.9-Hematuria, unspecified; R11.2-Nausea with  vomiting, unspecified; E87.2-Acidosis     FINDINGS: Weighted feeding tube with tip curled back into the gastric  fundus.     There is a left-sided internal/external nephrostomy tube. The lung bases  are clear. Moderate gastric distention. Large volume colonic stool with  gaseous distention of colon. No gross intraperitoneal free air.       Impression:      Feeding tube tip located in the gastric fundus.     This report was finalized on 04/14/2022 16:51 by Dr Rob Ribeiro, .    XR Abdomen KUB [[id number]] Collected: 04/14/22 1503     Updated: 04/14/22 1507    Narrative:      EXAMINATION: XR ABDOMEN KUB-     4/14/2022 2:39 PM CDT     HISTORY: NG placement; E87.6-Hypokalemia; N39.0-Urinary tract infection,  site not specified; R31.9-Hematuria, unspecified; R11.2-Nausea with  vomiting, unspecified; E87.2-Acidosis     Portable radiograph of the upper abdomen and lower chest.     A Dobbhoff feeding tube is now present and in good position with tip  extending to the distal stomach.     Diffuse dilation of bowel loops is again seen.     Left percutaneous nephrostomy/ureteral tube is again seen.     Summary:  1. Well-positioned Dobbhoff feeding tube.  This report was finalized on 04/14/2022 15:03 by Dr. Raúl Pan MD.    XR Abdomen KUB [[id number]] Collected: 04/14/22 1425     Updated: 04/14/22 1433    Narrative:      SINGLE VIEW ABDOMEN 4/14/2022      HISTORY: Dobhoff placement; E87.6-Hypokalemia; N39.0-Urinary tract  infection, site not specified; R31.9-Hematuria, unspecified;  R11.2-Nausea with vomiting, unspecified; E87.2-Acidosis     FINDINGS: Dobbhoff tube is nonvisualized. There  is a left-sided  internal/external nephrostomy tube. The lung bases are clear. Moderate  gastric distention. Large volume colonic stool with gaseous distention  of the colon as well, colonic distention measuring up to 7 cm diameter.  No gross intraperitoneal free air.       Impression:      Dobbhoff tube is nonvisualized and is either too shallow or  perhaps curled in the throat or upper esophagus. I would recommend  replacing and repeat abdominal film for confirmation.     Findings and recommendations were discussed with pt's nurse (3A) on  4/14/2022 at 2:29 PM CDT.  This report was finalized on 04/14/2022 14:30 by Dr Rob Ribeiro, .            Assessment/Plan       Intractable nausea and vomiting    Sepsis secondary to UTI (HCC)    Lactic acidosis    Hypokalemia    Essential (primary) hypertension    UTI (urinary tract infection), bacterial    Malfunction of nephrostomy tube ([facility])    Severe malnutrition (CMS/HCC)    Hypophosphatemia    · HX COVID 19 infection and prolonged complicated recovery ( PE, HD, hematoma obstructing ureter, necrotizing bladder infection,, bilateral neph tubes and left ureteral stent)  · HX of necrotizing bladder infection - notes indicate it occurred after hematoma became infected - treated at [place]  · leukocytosis -   · Coag neg staph blood culture - prob contaminant  · ESBL Klebsiella and Proteus UTI on admission  · Urine with >100,000 gram neg rods from 4/14 - restarted on ertapenem - High risk for MDRO         RECOMMENDATION:   · Continue ertapenem  · Monitor after stratton placement ( neph tube malfunction)  · Monitor blood cultures  · Monitor wbc  · Monitor HgB - steady decline  · Monitor temp curve closely    D/w Dr Schmid - [facility] does not have available bed     Rachael Foy MD  04/15/22  13:27 CDT

## 2022-04-15 NOTE — PLAN OF CARE
Goal Outcome Evaluation:  Plan of Care Reviewed With: patient         Patient was somnolent . Responded with pain stimuli with minimal response by frowning her eyebrows.. unable to follow commands. Contracted hand and ffoot drop present.. moaning at a times. Clenches her jaws tight, unable to make her mouth open.. Unable to feet or administer oral med. Attempted to place NG tube , was placed successfully patient pulled the first attempt. Placed 2nd and  3rd time , successfully but unable to pull the wire guide out . Fourth attempt was successful. Feeding started with Peptamen 1.5 with 25 cc /hr, water flush 30 cc/hr . Goal rate 80 cc/hr. Cath in and out performed to collect urine culture. Abdomen wound dressing dry intact. Back site urostomy dressing clean and dry. External catheter. Continue to monitor.

## 2022-04-15 NOTE — PROGRESS NOTES
"Palliative Care Daily Progress Note   Chief complaint: goals of care/advanced care planning  Code Status and Medical Interventions:   Ordered at: 04/03/22 0411     Level Of Support Discussed With:    Patient     Code Status (Patient has no pulse and is not breathing):    CPR (Attempt to Resuscitate)     Medical Interventions (Patient has pulse or is breathing):    Full Support     Subjective   Medical record reviewed. Events noted. Chart review reveals patient's spouse, Grant, has been in contact with  and case management inquiring about transfer to [facility] in [place], [place]. Apparently refusing LTAC placement and multiple SNF's have denied acceptance. Most recent CM note reveals he has been placed on a waiting list at [facility]. Remains somnolent and unable to participate in exam or follow commands. Dobbhoff placed yesterday, tube feeds infusing. Does not appear in any distress. No visitors at bedside.     Advance Care Planning   Advance Directive Status: Patient does not have advance directive     Advance Care Planning Discussion: Call placed to patient's spouse, rGant, unable to reach voicemail left with callback number. Contacted patient's mother, Marquita, to determine if she had any questions regarding condition and goals of care, update provided. Marquita expressed how worried she, her  (patient's father) and Grant as well as Grant's mother have been about Namita. Reflected on hospitalization when she first contracted COVID-19 and how it has gone \"down hill\" since. Shared she was recently able to stand at parallel bars with physical therapy when she was at nursing facility however started becoming nauseated and would vomit often which required hospitalization in [place], [place]. States her insurance was about to stop covering therapy services at nursing facility and she expressed she wanted to go home as they were no longer able to provide therapy for her there. Marquita " "shared that she and Grant's mother took turns sitting with Namita when child was at work or when the hired caregiver was unavailable. Expressed how concerned she was about her daughter since she was talking when first admitted and then seemed like she just suddenly stopped talking and participating. States she is worried her daughter will be labeled as \"stubborn\" but that she knows she is not being stubborn. Also states, \"Namita is a fighter. She will give anything to get strong again. She is not acting like this on purpose.\" Shared that hundreds to thousands of people are praying for her and praying that she wakes up. \"She is my only child so she will always be our baby and all I can do is just pray and pray harder that she will wake up and the Lord will heal her.\" Reports she is appreciative of care provided and all the hard work that has been done trying to identify cause. Marquita expressed how frustrating it is trying to find answers. Shared Namita's spouse, Grant, is \"a very nice man and usually very kind however has been aggravated and impatient because he just wants to see her get better. He still has some guilt because he develop COVID-19 after getting his first vaccine and was in the hospital for around a week and he blames himself on giving her COVID-19.\" Support provided, Marquita states, \"We will do anything we can for her since she can't do anything herself.\" She is hopeful [facility] will be able to accept her and they can provide further testing. Shared she will notify Grant of call. No further questions at this time.     Due to the Palliative Care Topics Discussed: palliative care, goals of care, care options and discharge options we will establish an advance care plan.   Goals of care: Ongoing.    The patient receives support from her spouse, parent and extended family.  POA/Healthcare Surrogate - Next of kin is her spouse, Grant.     Review of Systems   Unable to perform ROS: patient " nonverbal     Pain Assessment  Nonverbal Indicators of Pain: grimace  CPOT and PAINAD Scales: PAINAD (Pain Assessment in Advance Dementia Scale)  PAINAD Breathin-->normal  PAINAD Negative Vocalization: 0-->none  PAINAD Facial Expression: 0-->smiling or inexpressive  PAINAD Body Language: 0-->relaxed  PAINAD Consolability: 0-->no need to console  PAINAD Score: 0  Pain Location: abdomen    Objective   Diagnostics: Reviewed      Intake/Output Summary (Last 24 hours) at 4/15/2022 1313  Last data filed at 4/15/2022 0811  Gross per 24 hour   Intake 1462 ml   Output 300 ml   Net 1162 ml     Current Facility-Administered Medications   Medication Dose Route Frequency Provider Last Rate Last Admin   • bisacodyl (DULCOLAX) suppository 10 mg  10 mg Rectal Daily Amado Villarreal MD   10 mg at 04/15/22 08   • enoxaparin (LOVENOX) syringe 70 mg  1 mg/kg Subcutaneous Q12H Frank Ricks MD   70 mg at 04/15/22 0606   • ertapenem (INVanz) 1 g in sodium chloride 0.9 % 100 mL IVPB-VTB  1 g Intravenous Q24H Amado Villarreal  mL/hr at 22 1548 1 g at 22 1548   • famotidine (PEPCID) injection 20 mg  20 mg Intravenous Q12H Amado Villarreal MD   20 mg at 04/15/22 0856   • FLUoxetine (PROzac) capsule 20 mg  20 mg Oral Nightly Amado Villarreal MD   20 mg at 22   • fluticasone (FLONASE) 50 MCG/ACT nasal spray 2 spray  2 spray Each Nare BID Frank Ricks MD   2 spray at 04/15/22 0856   • folic acid (FOLVITE) tablet 1 mg  1 mg Oral Daily Amado Villarreal MD   1 mg at 22   • hydrALAZINE (APRESOLINE) injection 10 mg  10 mg Intravenous Q4H PRN Amado Villarreal MD       • labetalol (NORMODYNE,TRANDATE) injection 10 mg  10 mg Intravenous Q4H PRN Amado Villarreal MD   10 mg at 04/10/22 1518   • lactated ringers infusion  75 mL/hr Intravenous Continuous Amado Villarreal MD 75 mL/hr at 04/15/22 0408 75 mL/hr at 04/15/22 0408   • metoprolol succinate XL (TOPROL-XL) 24 hr tablet 50 mg  50 mg Oral Q24H Phil  "Amado ZELAYA MD   50 mg at 04/15/22 0856   • neomycin-polymyxin-hydrocortisone (CORTISPORIN) otic suspension 4 drop  4 drop Both Ears Q6H Fermin Mcclure DO   4 drop at 04/15/22 1231   • ondansetron (ZOFRAN) tablet 4 mg  4 mg Oral Q6H PRN Frank Ricks MD        Or   • ondansetron (ZOFRAN) injection 4 mg  4 mg Intravenous Q6H PRN Frank Ricks MD   4 mg at 04/07/22 0904   • oxymetazoline (AFRIN) nasal spray 2 spray  2 spray Nasal BID Frank Ricks MD   2 spray at 04/15/22 0856   • Pharmacy to Dose enoxaparin (LOVENOX)   Does not apply Continuous PRN Frank Ricks MD       • sennosides-docusate (PERICOLACE) 8.6-50 MG per tablet 2 tablet  2 tablet Oral Daily Amado Villarreal MD   2 tablet at 04/14/22 2200   • sodium bicarbonate tablet 650 mg  650 mg Oral BID Amado Villarreal MD   650 mg at 04/15/22 0856   • sodium chloride 0.9 % flush 10 mL  10 mL Intravenous PRN Frank Ricks MD       • sodium chloride 0.9 % flush 10 mL  10 mL Intravenous PRN Frank Ricks MD       • sodium chloride 0.9 % flush 10 mL  10 mL Intravenous PRN Frank Ricks MD       • sodium chloride 0.9 % flush 10 mL  10 mL Intravenous Q12H Frank Ricks MD   10 mL at 04/15/22 0857   • sodium chloride 0.9 % flush 10 mL  10 mL Intravenous PRN Frank Ricks MD   10 mL at 04/10/22 1518   • SUMAtriptan (IMITREX) tablet 50 mg  50 mg Oral Once Frank Ricks MD         lactated ringers, 75 mL/hr, Last Rate: 75 mL/hr (04/15/22 0408)  Pharmacy to Dose enoxaparin (LOVENOX),       hydrALAZINE  •  labetalol  •  ondansetron **OR** ondansetron  •  Pharmacy to Dose enoxaparin (LOVENOX)  •  sodium chloride  •  [COMPLETED] Insert peripheral IV **AND** sodium chloride  •  [COMPLETED] Insert peripheral IV **AND** sodium chloride  •  sodium chloride    Assessment:  Vital Signs: /68 (BP Location: Left arm, Patient Position: Lying)   Pulse 114   Temp 99.5 °F (37.5 °C) (Axillary)   Resp 20   Ht 170.2 cm (67\")   Wt 68 kg " (150 lb)   SpO2 100%   BMI 23.49 kg/m²     Physical Exam  Vitals and nursing note reviewed.   Constitutional:       General: She is sleeping. She is not in acute distress.     Appearance: She is ill-appearing.   HENT:      Head: Normocephalic and atraumatic.      Nose:      Comments: Dobbhoff to left nostril.  Eyes:      General: Lids are normal.   Neck:      Vascular: No JVD.   Cardiovascular:      Rate and Rhythm: Tachycardia present.      Heart sounds: Normal heart sounds.   Pulmonary:      Effort: Pulmonary effort is normal.      Breath sounds: Normal breath sounds.   Abdominal:      General: There is no distension.      Palpations: Abdomen is soft.      Comments: Mepilex to midline abdominal incision, CDI.    Genitourinary:     Comments: Left sided nephrostomy. Mojica catheter.  Musculoskeletal:      Cervical back: Neck supple.      Right foot: Foot drop present.      Left foot: Foot drop present.      Comments: Multipodus boots present to BLE    Skin:     General: Skin is warm and dry.      Coloration: Skin is pale.   Neurological:      Motor: Weakness and abnormal muscle tone present.      Comments: CARLI as she refuses to participate in exam.   Psychiatric:         Speech: She is noncommunicative.         Behavior: Behavior is uncooperative.         Cognition and Memory: Cognition is impaired.      Comments: CARLI as she refuses to participate in exam.       Patient status: Disease state: Controlled with current treatments.  Functional status: Palliative Performance Scale Score: Performance 10% based on the following measures: Ambulation: Totally bed bound, Activity and Evidence of Disease: Unable to do any work, extensive evidence of disease, Self-Care: Total care required,  Intake: Mouth care only, LOC: Drowsy or comatose. Nutritional status: Albumin 2.70.Body mass index is 23.49 kg/m².    Impression/Problem List:  1.    Altered mental status  2.    Impaired mobility and ability to perform activities of daily  "living  3.    Severe malnutrition  4.    Urinary tract infection  5.    Sepsis secondary to UTI  6.    Hypertension  7.    Malfunctioning nephrostomy tube  8.    Lactic acidosis  9.    Impaired functioning of gallbladder (sludge-filled and 20% EF per imaging)  10.  Hepatic steatosis  11.  Anemia  12.  Leukocytosis   13.  Pleural effusion, left  14.  Folate deficiency  15.  Anxiety  16.  Hyponatremia  17.  Dysphagia    18.  Intractable nausea and vomiting, improved  19.  History of COVID-19 (August 2021)  20.  Presence of Dobbhoff     Plans/Recommendations     1. Goals of care include CODE STATUS CPR with full support interventions.    2. Palliative care encounter  - Prognosis is guarded long-term secondary to poor performance status, altered mental status, severe malnutrition, urinary tract infection, sepsis, hepatic steatosis, anemia, impaired gallbladder functioning, recent complex medical history including renal failure requiring temporary dialysis, nephrostomy placement, bladder rupture and many other comorbidities listed above.   - Patient remains somnolent and unable to participate in exam or provide history. Attempted to call patient's spouse, Grant, however unable to reach. Spoke to patient's mother, Marquita.   - Marquita reflected on her daughter's previous hospitalizations and all the setbacks she has encountered. Stated, \"Namita is a fighter. She will give anything to get strong again.\"  - Appears to understand how complex her daughters cases but remains optimistic of recovery. Shared \"All I can do is just pray and pray harder that she will wake up and the Lord will heal her.\"   - Expressed she and Grant keep each other updated and are hopeful she will be able to transfer to [facility] for further workup.   - Will plan to follow up on Monday if she remains hospitalized at [facility] as there is no Palliative Care coverage this weekend.      Thank you for allowing us to participate in patient's plan of " care. Palliative Care Team will continue to follow patient.     Time spent: 45 minutes spent reviewing medical and medication records, assessing and examining patient, discussing with family, answering questions, providing some guidance about a plan and documentation of care, and coordinating care with other healthcare members, with > 50% time spent face to face.   20 minutes spent on advance care planning via telephone with patient's mother.     Nneka Barton, APRN  4/15/2022

## 2022-04-15 NOTE — CASE MANAGEMENT/SOCIAL WORK
Continued Stay Note  [facility]     Patient Name: Namita Zabala  MRN: [id number]  Today's Date: 4/15/2022    Admit Date: 4/2/2022     Discharge Plan     Row Name 04/15/22 1121       Plan    Plan CM spoke with [facility], [facility] transfer center. Pending bed, placed on waiting list. Dr Rey on call today. Urologist at [facility]. Maliha is reaching out to Dr Rey and will be contacting Dr Villarreal with further information from Dr Rey. CM will be notified of status per Maliha at transfer center after contact made with MD. Family notified of progress with possible tertiary transfer to [facility].  Dr Villarreal updated and will be expecting call from [facility].  Contact number for Maliha at [facility] transfer center:  [phone]  Waiting list is long per Maliha.  Could be days she said.   Pending return call from transfer center/ pending call from Dr Rey for Dr Villarreal.                Discharge Codes    No documentation.                     Mi Hardy RN

## 2022-04-15 NOTE — PLAN OF CARE
Goal Outcome Evaluation:           Progress: (P) no change  Outcome Evaluation: (P) see note    Clinical bedside swallow evaluation completed. Pt asleep at time of arrival. Pt required constant verbal and tactile stimulation to open eyes. Oral motor assessment could not be completed due to pt not following commands. Labial ice chip stimulation only. Initiated swallow x2.  Pt did not open mouth to accept ice chip. Ice chip melted with water residue on lips and chin, no response to this by patient.     SLP recommends NPO. Patient currently with NG in place. Unless patient is able to begin participating with intake she may warrant long term alternate means of nutrition if this is deemed appropriate per MD and family wishes to continue aggressive measures.  SLP will follow-up. Main barrier to PO intake is cognitive status.     Ana Arguello, SLP Student

## 2022-04-15 NOTE — THERAPY EVALUATION
Acute Care - Speech Language Pathology   Swallow Initial Evaluation [facility]     Patient Name: Namita Zabala  : 1975  MRN: [id number]  Today's Date: 4/15/2022               Admit Date: 2022     Clinical bedside swallow evaluation completed. Pt asleep at time of arrival. Pt required constant verbal and tactile stimulation to open eyes. Oral motor assessment could not be completed due to pt not following commands. Labial ice chip stimulation only. Initiated swallow x2.  Pt did not open mouth to accept ice chip. Ice chip melted with water residue on lips and chin, no response to this by patient.     SLP recommends NPO. Patient currently with NG in place. Unless patient is able to begin participating with intake she may warrant long term alternate means of nutrition if this is deemed appropriate per MD and family wishes to continue aggressive measures.  SLP will follow-up. Main barrier to PO intake is cognitive status.     Ana Arguello, SLP Student     Visit Dx:     ICD-10-CM ICD-9-CM   1. Hypokalemia  E87.6 276.8   2. Urinary tract infection with hematuria, site unspecified  N39.0 599.0    R31.9 599.70   3. Nausea and vomiting, unspecified vomiting type  R11.2 787.01   4. Lactic acidosis  E87.2 276.2   5. Dysphagia, unspecified type  R13.10 787.20     Patient Active Problem List   Diagnosis    COVID-19    Acute respiratory failure with hypoxia (HCC)    Sepsis secondary to UTI (HCC)    Acute pulmonary embolism (HCC)    Chronic migraine    MVP (mitral valve prolapse)    Bilateral pneumonia    Lactic acidosis    Elevated transaminase level    JUVENAL (acute kidney injury) (HCC)    Ileus (HCC)    Hyperkalemia    Pelvic hematoma, female    Acute blood loss anemia    Cytokine release syndrome, grade 4    Hypokalemia    Anxiety    Essential (primary) hypertension    Myofascial pain syndrome    Vitamin B12 deficiency    Intractable nausea and vomiting    UTI (urinary tract infection), bacterial     Malfunction of nephrostomy tube (HCC)    Severe malnutrition (CMS/HCC)    Hypophosphatemia     Past Medical History:   Diagnosis Date    Angina at rest (HCC)     Migraine     Sees Pain Management for injections.     MVP (mitral valve prolapse)     Renal disorder     Syncope      Past Surgical History:   Procedure Laterality Date    BREAST BIOPSY Right 2011    benign    INSERTION HEMODIALYSIS CATHETER Left 9/16/2021    Procedure: HEMODIALYSIS CATHETER INSERTION;  Surgeon: Anders Kaur MD;  Location: [place] [id number];  Service: Vascular;  Laterality: Left;    TONSILLECTOMY         SLP Recommendation and Plan  SLP Swallowing Diagnosis: R/O pharyngeal dysphagia, unable/unwilling to cooperate (04/15/22 0946)  SLP Diet Recommendation: temporary alternate methods of nutrition/hydration, NPO (04/15/22 0946)           Monitor for Signs of Aspiration: yes, notify SLP if any concerns (04/15/22 0946)  Recommended Diagnostics: reassess via clinical swallow evaluation (04/15/22 0946)  Swallow Criteria for Skilled Therapeutic Interventions Met: demonstrates skilled criteria (04/15/22 0946)     Rehab Potential/Prognosis, Swallowing: re-evaluate goals as necessary (04/15/22 0946)                                               SWALLOW EVALUATION (last 72 hours)       SLP Adult Swallow Evaluation       Row Name 04/15/22 0946                   Rehab Evaluation    Document Type evaluation  -MM (r) BW (t) MM (c)        Subjective Information no complaints  -MM (r) BW (t) MM (c)        Patient Observations lethargic;poorly cooperative  -MM        Patient/Family/Caregiver Comments/Observations No family present.  -MM        Patient Effort poor  -MM (r) BW (t) MM (c)        Symptoms Noted During/After Treatment none  -MM (r) BW (t) MM (c)                  General Information      Patient Profile Reviewed yes  -MM (r) BW (t) MM (c)        Pertinent History Of Current Problem [place], vomitting, UTI, metabolic encephalopathy, MVP,  renal failure  -MM (r) BW (t) MM (c)        Current Method of Nutrition NPO;nasogastric feedings  -MM (r) BW (t) MM (c)        Precautions/Limitations, Vision WFL;for purposes of eval  -MM (r) BW (t) MM (c)        Precautions/Limitations, Hearing other (see comments)  unknown  -MM        Prior Level of Function-Communication unknown  -MM        Prior Level of Function-Swallowing unknown  -MM        Plans/Goals Discussed with patient;other (see comments);agreed upon  CARLEY Brand  -MM        Barriers to Rehab medically complex;cognitive status  -MM (r) BW (t) MM (c)        Patient's Goals for Discharge patient did not state  -MM                  Pain      Additional Documentation Pain Scale: FACES Pre/Post-Treatment (Group)  -MM                  Pain Scale: Numbers Pre/Post-Treatment      Pretreatment Pain Rating --  -MM        Posttreatment Pain Rating --  -MM                  Pain Scale: FACES Pre/Post-Treatment      Pain: FACES Scale, Pretreatment 0-->no hurt  -MM        Posttreatment Pain Rating 0-->no hurt  -MM                  Oral Motor Structure and Function      Oral Lesions or Structural Abnormalities and/or variants --  Could not assess  -MM (r) BW (t) MM (c)                  Oral Musculature and Cranial Nerve Assessment      Oral Motor General Assessment unable to assess  -MM (r) BW (t) MM (c)                  General Eating/Swallowing Observations      Respiratory Support Currently in Use room air  -MM (r) BW (t) MM (c)        Eating/Swallowing Skills other (see comments)  could not assess  -MM (r) BW (t) MM (c)        Positioning During Eating upright in bed  -MM (r) BW (t) MM (c)        Utensils Used spoon  -MM (r) BW (t) MM (c)        Consistencies Trialed ice chips  -MM (r) BW (t) MM (c)                  Clinical Swallow Eval      Oral Prep Phase impaired  -MM        Pharyngeal Phase --  cannot rule out  -MM        Clinical Swallow Evaluation Summary See note  -MM                  Oral Prep Concerns       Oral Prep Concerns reduced lip opening  -MM        Reduced Lip Opening thin  -MM                  SLP Evaluation Clinical Impression      SLP Swallowing Diagnosis R/O pharyngeal dysphagia;unable/unwilling to cooperate  -MM        Functional Impact risk of aspiration/pneumonia;risk of malnutrition;risk of dehydration  -MM        Rehab Potential/Prognosis, Swallowing re-evaluate goals as necessary  -MM        Swallow Criteria for Skilled Therapeutic Interventions Met demonstrates skilled criteria  -MM                  Recommendations      Therapy Frequency (Swallow) PRN  -MM (r) BW (t) MM (c)        Predicted Duration Therapy Intervention (Days) until discharge  -MM (r) BW (t) MM (c)        SLP Diet Recommendation temporary alternate methods of nutrition/hydration;NPO  -MM        Recommended Diagnostics reassess via clinical swallow evaluation  -MM        Oral Care Recommendations Oral Care BID/PRN  -MM (r) BW (t) MM (c)        SLP Rec. for Method of Medication Administration meds via alternate route  -MM (r) BW (t) MM (c)        Monitor for Signs of Aspiration yes;notify SLP if any concerns  -MM        Anticipated Discharge Disposition (SLP) skilled nursing facility  -MM (r) BW (t) MM (c)                  Swallow Goals (SLP)      Oral Nutrition/Hydration Goal Selection (SLP) oral nutrition/hydration, SLP goal 1  -MM (r) BW (t) MM (c)                  Oral Nutrition/Hydration Goal 1 (SLP)      Oral Nutrition/Hydration Goal 1, SLP Pt will tolerate LRD with no s/s of aspiration  -MM (r) BW (t) MM (c)        Time Frame (Oral Nutrition/Hydration Goal 1, SLP) by discharge  -MM (r) BW (t) MM (c)        Barriers (Oral Nutrition/Hydration Goal 1, SLP) cognitive status/participation  -MM (r) BW (t) MM (c)        Progress/Outcomes (Oral Nutrition/Hydration Goal 1, SLP) goal ongoing  -MM (r) BW (t) MM (c)                User Key  (r) = Recorded By, (t) = Taken By, (c) = Cosigned By      Initials Name Effective Dates    MM  Maria Del Carmen Pantoja MS CCC-SLP 06/16/21 -     Ana Romero, Speech Therapy Student 01/06/22 -                     EDUCATION  The patient has been educated in the following areas:   Dysphagia (Swallowing Impairment).        SLP GOALS       Row Name 04/15/22 0946             Oral Nutrition/Hydration Goal 1 (SLP)    Oral Nutrition/Hydration Goal 1, SLP Pt will tolerate LRD with no s/s of aspiration  -MM (r) BW (t) MM (c)      Time Frame (Oral Nutrition/Hydration Goal 1, SLP) by discharge  -MM (r) BW (t) MM (c)      Barriers (Oral Nutrition/Hydration Goal 1, SLP) cognitive status/participation  -MM (r) BW (t) MM (c)      Progress/Outcomes (Oral Nutrition/Hydration Goal 1, SLP) goal ongoing  -MM (r) BW (t) MM (c)                User Key  (r) = Recorded By, (t) = Taken By, (c) = Cosigned By      Initials Name Provider Type    Maria Del Carmen Quintanilla MS CCC-SLP Speech and Language Pathologist    Ana Romero, Speech Therapy Student SLP Student                       Time Calculation:    Time Calculation- SLP       Row Name 04/15/22 1039             Time Calculation- SLP    SLP Start Time 0946  -MM (r) BW (t) MM (c)      SLP Stop Time 1040  -MM (r) BW (t) MM (c)      SLP Time Calculation (min) 54 min  -MM (r) BW (t)      SLP Received On 04/15/22  -MM (r) BW (t) MM (c)      SLP Goal Re-Cert Due Date 04/25/22  -MM (r) BW (t) MM (c)              Untimed Charges      SLP Eval/Re-eval  ST Eval Oral Pharyng Swallow - [id number]  -MM (r) BW (t) MM (c)      21207-[id number] Eval Oral Pharyng Swallow Minutes 54  -MM (r) BW (t) MM (c)              Total Minutes      Untimed Charges Total Minutes 54  -MM (r) BW (t)       Total Minutes 54  -MM (r) BW (t)              User Key  (r) = Recorded By, (t) = Taken By, (c) = Cosigned By      Initials Name Provider Type    Maria Del Carmen Quintanilla MS CCC-SLP Speech and Language Pathologist    Ana Romero, Speech Therapy Student SLP Student                    Therapy Charges for Today        Code Description Service Date Service Provider Modifiers Qty    [id number]  ST EVAL ORAL PHARYNG SWALLOW 4 4/15/2022 Maria Del Carmen Pantoja, MS CCC-SLP GN 1                 Maria Del Carmen Pantoja, MS CCC-SLP  4/15/2022

## 2022-04-15 NOTE — PAYOR COMM NOTE
"AUTH: [id number]    Namita Cooper N (46 y.o. Female)             Date of Birth   1975    Social Security Number       Address   [address] [place], [place] [address]    Home Phone   [phone]    MRN   [id number]       [facility]    Marital Status                               Admission Date   4/2/22    Admission Type   Emergency    Admitting Provider   Amado Villarreal MD    Attending Provider   Amado Villarreal MD    Department, Room/Bed   [facility] 3A, 346/1       Discharge Date       Discharge Disposition       Discharge Destination                               Attending Provider: Amado Villarreal MD    Allergies: Coconut, Nuts, Penicillins, [place]    Isolation: Contact   Infection: COVID (History) (09/15/21), ESBL Klebsiella (04/07/22)   Code Status: CPR   Advance Care Planning Activity    Ht: 170.2 cm (67\")   Wt: 68 kg (150 lb)    Admission Cmt: None   Principal Problem: Intractable nausea and vomiting [R11.2]                 Active Insurance as of 4/2/2022     Primary Coverage     Payor Plan Insurance Group Employer/Plan Group    ANTHEM BLUE CROSS ANTHEM BLUE CROSS BLUE SHIELD PPO [id number]     Payor Plan Address Payor Plan Phone Number Payor Plan Fax Number Effective Dates    [address] [phone]  1/1/2022 - None Entered    [facility] [address]       Subscriber Name Subscriber Birth Date Member ID       GRANT COOPER D 1975 [id number]           Secondary Coverage     Payor Plan Insurance Group Employer/Plan Group    MEDICARE MEDICARE A & B      Payor Plan Address Payor Plan Phone Number Payor Plan Fax Number Effective Dates    [address] [phone]  7/1/2019 - None Entered    [facility] [address]       Subscriber Name Subscriber Birth Date Member ID       NAMITA COOPER N 1975 [id number]                 Emergency Contacts      (Rel.) Home Phone Work Phone Mobile Phone    BaronGrant valdivia (Spouse) -- -- [phone]    " Noel Johnson (Father) [phone] -- [phone]    Marquita Johnson (Mother) -- -- [phone]    Neida Zabala -- -- [phone]               Physician Progress Notes (last 24 hours)      Amado Villarreal MD at 04/14/22 1324              [facility]  INPATIENT PROGRESS NOTE    Length of Stay: 10  Date of Admission: 4/2/2022  Primary Care Physician: David Lara MD    Subjective   Chief Complaint: Failure to thrive/metabolic acidosis    HPI   Metabolic acidosis resolved.  Change bicarb drip to LR for now.  Recurrent urinary tract infection, start back on Invanz, culture still pending.  Patient is afebrile, blood pressure stable slightly tacky.    Review of Systems   Patient is arousable, refusing to talk.  No sign of acute distress.    All pertinent negatives and positives are as above. All other systems have been reviewed and are negative unless otherwise stated.     Objective    Temp:  [97.3 °F (36.3 °C)-98.4 °F (36.9 °C)] 97.6 °F (36.4 °C)  Heart Rate:  [] 101  Resp:  [18-20] 20  BP: (117-144)/(76-99) 139/97    Intake/Output Summary (Last 24 hours) at 4/14/2022 1324  Last data filed at 4/14/2022 0900  Gross per 24 hour   Intake 300 ml   Output 1000 ml   Net -700 ml     Physical Exam  Constitutional:       Appearance: She is well-developed.   HENT:      Head: Normocephalic.   Eyes:      Conjunctiva/sclera: Conjunctivae normal.      Pupils: Pupils are equal, round, and reactive to light.   Neck:      Vascular: No JVD.   Cardiovascular:      Rate and Rhythm: Regular rhythm.  Rates in the 100 .     Heart sounds: Normal heart sounds. No murmur heard.    No friction rub. No gallop.   Pulmonary:      Effort: No respiratory distress.      Breath sounds: No wheezing or rales.      Comments: Diminished breath sound bilateral, clear .  Chest:      Chest wall: No tenderness.   Abdominal:      General: Bowel sounds are normal. There is no distension.       Palpations: Abdomen is soft.      Tenderness: There is no abdominal tenderness. There is no guarding or rebound.   Musculoskeletal:         General: No tenderness or deformity.      Cervical back: Neck supple.   Skin:     General: Skin is warm and dry.      Capillary Refill: Capillary refill takes 2 to 3 seconds.      Findings: No rash.   Neurological:      Cranial Nerves: No cranial nerve deficit.      Motor: Weakness present. No abnormal muscle tone.      Coordination: Coordination abnormal.      Gait: Gait abnormal.      Deep Tendon Reflexes: Reflexes normal.   Psychiatric:        Results Review:  I have reviewed the labs, radiology results, and diagnostic studies.    Lab Results (last 24 hours)     Procedure Component Value Units Date/Time    Comprehensive Metabolic Panel [[id number]]  (Abnormal) Collected: 04/14/22 1116    Specimen: Blood Updated: 04/14/22 1232     Glucose 110 mg/dL      BUN 7 mg/dL      Creatinine 0.72 mg/dL      Sodium 137 mmol/L      Potassium 3.4 mmol/L      Comment: Slight hemolysis detected by analyzer. Results may be affected.        Chloride 99 mmol/L      CO2 25.0 mmol/L      Calcium 8.8 mg/dL      Total Protein 6.0 g/dL      Albumin 2.70 g/dL      ALT (SGPT) 13 U/L      AST (SGOT) 21 U/L      Comment: Slight hemolysis detected by analyzer. Results may be affected.        Alkaline Phosphatase 111 U/L      Total Bilirubin 0.5 mg/dL      Globulin 3.3 gm/dL      A/G Ratio 0.8 g/dL      BUN/Creatinine Ratio 9.7     Anion Gap 13.0 mmol/L      eGFR 105.9 mL/min/1.73      Comment: National Kidney Foundation and American Society of Nephrology (ASN) Task Force recommended calculation based on the Chronic Kidney Disease Epidemiology Collaboration (CKD-EPI) equation refit without adjustment for race.       Narrative:      GFR Normal >60  Chronic Kidney Disease <60  Kidney Failure <15      Protime-INR [[id number]]  (Abnormal) Collected: 04/14/22 1116    Specimen: Blood Updated: 04/14/22 1219      Protime 17.0 Seconds      INR 1.44    CBC & Differential [[id number]]  (Abnormal) Collected: 04/14/22 1116    Specimen: Blood Updated: 04/14/22 1213    Narrative:      The following orders were created for panel order CBC & Differential.  Procedure                               Abnormality         Status                     ---------                               -----------         ------                     CBC Auto Differential[[id number]]        Abnormal            Final result                 Please view results for these tests on the individual orders.    CBC Auto Differential [[id number]]  (Abnormal) Collected: 04/14/22 1116    Specimen: Blood Updated: 04/14/22 1213     WBC 10.54 10*3/mm3      RBC 3.54 10*6/mm3      Hemoglobin 9.9 g/dL      Hematocrit 29.6 %      MCV 83.6 fL      MCH 28.0 pg      MCHC 33.4 g/dL      RDW 14.9 %      RDW-SD 45.7 fl      MPV 9.8 fL      Platelets 354 10*3/mm3      Neutrophil % 77.6 %      Lymphocyte % 14.7 %      Monocyte % 4.5 %      Eosinophil % 1.7 %      Basophil % 0.6 %      Immature Grans % 0.9 %      Neutrophils, Absolute 8.19 10*3/mm3      Lymphocytes, Absolute 1.55 10*3/mm3      Monocytes, Absolute 0.47 10*3/mm3      Eosinophils, Absolute 0.18 10*3/mm3      Basophils, Absolute 0.06 10*3/mm3      Immature Grans, Absolute 0.09 10*3/mm3      nRBC 0.2 /100 WBC     Urinalysis, Microscopic Only - Urine, Catheter In/Out [[id number]]  (Abnormal) Collected: 04/14/22 1025    Specimen: Urine, Catheter In/Out Updated: 04/14/22 1050     RBC, UA 0-2 /HPF      WBC, UA Too Numerous to Count /HPF      Bacteria, UA 4+ /HPF      Squamous Epithelial Cells, UA None Seen /HPF      Hyaline Casts, UA None Seen /LPF      Methodology Manual Light Microscopy    Urine Culture - Urine, Urine, Catheter In/Out [[id number]] Collected: 04/14/22 1025    Specimen: Urine, Catheter In/Out Updated: 04/14/22 1050    Urinalysis With Culture If Indicated - Urine, Catheter In/Out [[id number]]  (Abnormal) Collected:  04/14/22 1025    Specimen: Urine, Catheter In/Out Updated: 04/14/22 1040     Color, UA Yellow     Appearance, UA Cloudy     pH, UA >=9.0     Specific Gravity, UA 1.007     Glucose, UA Negative     Ketones, UA Trace     Bilirubin, UA Negative     Blood, UA Large (3+)     Protein, UA Trace     Leuk Esterase, UA Large (3+)     Nitrite, UA Negative     Urobilinogen, UA 0.2 E.U./dL          Cultures:    No results found for: BLOODCX, URINECX, WOUNDCX, MRSACX, RESPCX, STOOLCX    Radiology Data:    Imaging Results (Last 24 Hours)     Procedure Component Value Units Date/Time    XR Abdomen KUB [[id number]] Resulted: 04/14/22 1252     Updated: 04/14/22 1255          Allergies   Allergen Reactions   • Coconut Unknown - High Severity   • Nuts Unknown - High Severity   • Penicillins    • [place] Other (See Comments)     Causes migraines per pt reports       Scheduled meds:   bisacodyl, 10 mg, Rectal, Daily  enoxaparin, 1 mg/kg, Subcutaneous, Once  [START ON 4/15/2022] enoxaparin, 1 mg/kg, Subcutaneous, Q12H  famotidine, 20 mg, Intravenous, Q12H  FLUoxetine, 20 mg, Oral, Nightly  fluticasone, 2 spray, Each Nare, BID  folic acid, 1 mg, Oral, Daily  metoprolol succinate XL, 50 mg, Oral, Q24H  neomycin-polymyxin-hydrocortisone, 4 drop, Both Ears, Q6H  oxymetazoline, 2 spray, Nasal, BID  senna-docusate sodium, 2 tablet, Oral, Daily  sodium bicarbonate, 650 mg, Oral, BID  sodium chloride, 10 mL, Intravenous, Q12H  SUMAtriptan, 50 mg, Oral, Once        PRN meds:  hydrALAZINE  •  labetalol  •  ondansetron **OR** ondansetron  •  Pharmacy to Dose enoxaparin (LOVENOX)  •  sodium chloride  •  [COMPLETED] Insert peripheral IV **AND** sodium chloride  •  [COMPLETED] Insert peripheral IV **AND** sodium chloride  •  sodium chloride    Assessment/Plan       Intractable nausea and vomiting    Sepsis secondary to UTI (HCC)    Lactic acidosis    Hypokalemia    Essential (primary) hypertension    UTI (urinary tract infection), bacterial     Malfunction of nephrostomy tube ([facility])    Severe malnutrition (CMS/HCC)    Hypophosphatemia        Plan:  HPI.  Patient was admitted on 4/3 by Dr. Ricks.  Had COVID-19 here last year and ended up having an abdominal hematoma and had to go to [facility].  Was there for a long time and then an LTAC and then a skilled nursing facility.  Comes back with multidrug-resistant UTI on Invanz.   She presented with complaints of intractable nausea and vomiting.  Her medical problems started last August when she developed COVID-19 pneumonia and had numerous complications thereafter.  She developed an abdominal hematoma and had to go on dialysis secondary to extrinsic compression of her urinary system. She was transferred from here to [facility] and required exploratory laparotomy.  She ended up having bilateral percutaneous nephrostomy tubes and also ended up with a left ureteral stent.  She states that her main urologist is Dr. Rina Rey.  She states that she stopped producing urine from her percutaneous nephrostomy tube recently and that there are plans for it to be removed.  She states that she had gotten in touch with [facility] to see if someone here could do it so she would not have to travel.  When she left [facility], she spent considerable time at a skilled nursing facility in [place], [place].  She states that she has only been home a few weeks.  She was admitted to [facility] in [place] on 3/9 for electrolyte abnormalities.     UTI.  Renal function stable yesterday with a creatinine 0.76.  Dr. Ricks began treating a urinary tract infection with ceftriaxone.  She had a multidrug-resistant Klebsiella in October 2021.  I transitioned her to [facility] on 4/3.  Lactate down to 1.7 from 2.8 after fluids.  Urine does appear infected with too numerous to count white blood cells, 4+ bacteria and large leukocyte esterase. The sample was also bloody.  Urine culture has grown 2 distinct pathogens.   There is a multidrug-resistant Proteus mirabilis strain and an ESBL Klebsiella strain.  Invanz should be treating both of these organisms concurrently.  Today is day 5 of Invanz.  We will treat 7-10 days.  Lactic acidosis improved after fluids and antibiotics.  Procalcitonin elevated at 0.31.  Dr. Dyer is familiar with her previous problems as he saw her last year.  I consulted him to evaluate her.  He wants to treat her current infection and then have her follow back at [facility].  On Invanz.  Recommendation from urology-  Urology saw her and wants her to go back to [facility] to have her percutaneous nephrostomy pulled.     Hypokalemia .  P.o. potassium..  Magnesium-normal.     Sludge in gallbladder/dysfunctional gallbladder?.  General surgery consult.  GI consult. No surgery per general surgery.  CT scan abdomen pelvic-persistent mild but improved urothelial thickening involving the left renal pelvis and proximal left ureter- related to resolving UTI,  internal/external left-sided nephroureterostomy tube appears in satisfactory position, mild left-sided hydronephrosis which is decreased, Small amount gas is noted in the bladder,  No significant bladder wall thickening is identified, Small left pleural effusion- Increased hazy airspace opacities in the lung bases favored to represent diffuse atelectasis,  Based on the volume of gas within the GI tract- mild ileus may be present,  No evidence of bowel obstruction, Small amount fluid within the upper vagina where previously there was a small amount of gas, Hepatic steatosis.  HIDA scan-20% biliary ejection fraction.  Ultrasound abdomen pelvic-Sludge-filled gallbladder with no significant gallbladder wall thickening, pericholecystic fluid or convincing evidence of acute cholecystitis, Mild dilation of the common hepatic duct with no visualized choledocholithiasis, Hepatic steatosis.     Metabolic acidosis.  Bicarb drip.  P.o.  bicarb.     Nausea/vomiting.  Nausea vomiting resolved.  DC Reglan.  Protonix.  Bicarb. DC Inapsine as needed.  DC Compazine as needed. DC Phenergan as needed.     Sinus congestion/sinus pain.  Flonase.  Corticosporin eardrop.  ENT consult.  Afrin.  Dr. Nichols saw her and has wanted to do an audiogram, but she has declined to go over to his office the last 2 days.   CTA chest-No evidence of pulmonary embolus, Small LEFT pleural effusion, Bandlike areas of reticulation throughout both lungs likely representing scarring/fibrosis related to prior Covid 19 infection.      Hypertension/tachycardia.  Tachycardia resolved.   Cut back Toprol.     History of pulmonary embolus.  Hold Xarelto possible surgery.    DC Lovenox and put patient back on Xarelto 2022.     Anxiety/depression/insomnia . Decrease Prozac nightly due to somnolence.   DC Ambien as needed due to somnolence.  DC trazodone due to some.     Headaches/altered mental status.    DC Fioricet as needed due to somnolence.   Neurology signed off.  CT scan head-No acute intracranial abnormality is seen.  EEG-pending.     Pain.  DC Robaxin as needed due to somnolence. Morphine as needed.      Anemia.  Folate deficiency . p.o. folate.  No sign of acute bleed.  Hemoglobin stable.     Allergic rhinitis.  Flonase.     Nutrition.  Start NG tube feeding.     Deconditioning.  PT and OT consult.  Last time patient walk was in August of last year after Covid. Patient is mostly bedbound at the nursing home.     Blood culture-contamination with coagulase negative Staphylococcus.   Covid-19-negative.  Flu screen A and B-negative.  Urine culture pending.     Discharge Plannin to 6 days.     agree with rehab placement in [place] or [place].  Family refused LTAC placement.  Just talk to [facility], wait for Dr. Rey to call back-urology.  Hopefully possible transfer.    Electronically signed by Amado Villarreal MD, 22, 1:24 PM  CDT.                    Electronically signed by Amado Villarreal MD at 04/14/22 6984

## 2022-04-15 NOTE — PLAN OF CARE
Goal Outcome Evaluation:  Plan of Care Reviewed With: patient        Progress: no change  Outcome Evaluation: Pt drowsy and nonverbal. Tolerating tuve feeding. Continue with wrist restraints. Dressing dry and intact on back. Continue to turn every 2 hrs. Contineuto monitor.

## 2022-04-16 ENCOUNTER — APPOINTMENT (OUTPATIENT)
Dept: MRI IMAGING | Facility: HOSPITAL | Age: 47
End: 2022-04-16

## 2022-04-16 ENCOUNTER — APPOINTMENT (OUTPATIENT)
Dept: CT IMAGING | Facility: HOSPITAL | Age: 47
End: 2022-04-16

## 2022-04-16 ENCOUNTER — APPOINTMENT (OUTPATIENT)
Dept: GENERAL RADIOLOGY | Facility: HOSPITAL | Age: 47
End: 2022-04-16

## 2022-04-16 LAB
ABO GROUP BLD: NORMAL
ALBUMIN SERPL-MCNC: 2 G/DL (ref 3.5–5.2)
ALBUMIN/GLOB SERPL: 0.9 G/DL
ALP SERPL-CCNC: 70 U/L (ref 39–117)
ALT SERPL W P-5'-P-CCNC: 12 U/L (ref 1–33)
ANION GAP SERPL CALCULATED.3IONS-SCNC: 11 MMOL/L (ref 5–15)
ANION GAP SERPL CALCULATED.3IONS-SCNC: 18 MMOL/L (ref 5–15)
AST SERPL-CCNC: 23 U/L (ref 1–32)
BASOPHILS # BLD AUTO: 0.03 10*3/MM3 (ref 0–0.2)
BASOPHILS NFR BLD AUTO: 0.2 % (ref 0–1.5)
BILIRUB SERPL-MCNC: 0.3 MG/DL (ref 0–1.2)
BLD GP AB SCN SERPL QL: NEGATIVE
BUN SERPL-MCNC: 14 MG/DL (ref 6–20)
BUN SERPL-MCNC: 17 MG/DL (ref 6–20)
BUN/CREAT SERPL: 7.6 (ref 7–25)
BUN/CREAT SERPL: 7.9 (ref 7–25)
CALCIUM SPEC-SCNC: 7.7 MG/DL (ref 8.6–10.5)
CALCIUM SPEC-SCNC: 7.8 MG/DL (ref 8.6–10.5)
CHLORIDE SERPL-SCNC: 103 MMOL/L (ref 98–107)
CHLORIDE SERPL-SCNC: 108 MMOL/L (ref 98–107)
CO2 SERPL-SCNC: 17 MMOL/L (ref 22–29)
CO2 SERPL-SCNC: 24 MMOL/L (ref 22–29)
CREAT SERPL-MCNC: 1.77 MG/DL (ref 0.57–1)
CREAT SERPL-MCNC: 2.25 MG/DL (ref 0.57–1)
DEPRECATED RDW RBC AUTO: 51.4 FL (ref 37–54)
EGFRCR SERPLBLD CKD-EPI 2021: 27 ML/MIN/1.73
EGFRCR SERPLBLD CKD-EPI 2021: 36 ML/MIN/1.73
EOSINOPHIL # BLD AUTO: 0.04 10*3/MM3 (ref 0–0.4)
EOSINOPHIL NFR BLD AUTO: 0.3 % (ref 0.3–6.2)
ERYTHROCYTE [DISTWIDTH] IN BLOOD BY AUTOMATED COUNT: 15.5 % (ref 12.3–15.4)
GLOBULIN UR ELPH-MCNC: 2.3 GM/DL
GLUCOSE BLDC GLUCOMTR-MCNC: 179 MG/DL (ref 70–130)
GLUCOSE SERPL-MCNC: 138 MG/DL (ref 65–99)
GLUCOSE SERPL-MCNC: 195 MG/DL (ref 65–99)
HCT VFR BLD AUTO: 15.8 % (ref 34–46.6)
HCT VFR BLD AUTO: 26.3 % (ref 34–46.6)
HGB BLD-MCNC: 4.8 G/DL (ref 12–15.9)
HGB BLD-MCNC: 9 G/DL (ref 12–15.9)
IMM GRANULOCYTES # BLD AUTO: 0.54 10*3/MM3 (ref 0–0.05)
IMM GRANULOCYTES NFR BLD AUTO: 3.4 % (ref 0–0.5)
LYMPHOCYTES # BLD AUTO: 2.63 10*3/MM3 (ref 0.7–3.1)
LYMPHOCYTES NFR BLD AUTO: 16.8 % (ref 19.6–45.3)
MAGNESIUM SERPL-MCNC: 1.5 MG/DL (ref 1.6–2.6)
MCH RBC QN AUTO: 27.9 PG (ref 26.6–33)
MCHC RBC AUTO-ENTMCNC: 30.4 G/DL (ref 31.5–35.7)
MCV RBC AUTO: 91.9 FL (ref 79–97)
MONOCYTES # BLD AUTO: 1.21 10*3/MM3 (ref 0.1–0.9)
MONOCYTES NFR BLD AUTO: 7.7 % (ref 5–12)
NEUTROPHILS NFR BLD AUTO: 11.25 10*3/MM3 (ref 1.7–7)
NEUTROPHILS NFR BLD AUTO: 71.6 % (ref 42.7–76)
NRBC BLD AUTO-RTO: 0.4 /100 WBC (ref 0–0.2)
PHOSPHATE SERPL-MCNC: 1.4 MG/DL (ref 2.5–4.5)
PLATELET # BLD AUTO: 301 10*3/MM3 (ref 140–450)
PMV BLD AUTO: 10 FL (ref 6–12)
POTASSIUM SERPL-SCNC: 3.1 MMOL/L (ref 3.5–5.2)
POTASSIUM SERPL-SCNC: 3.6 MMOL/L (ref 3.5–5.2)
PROT SERPL-MCNC: 4.3 G/DL (ref 6–8.5)
RBC # BLD AUTO: 1.72 10*6/MM3 (ref 3.77–5.28)
RH BLD: POSITIVE
SODIUM SERPL-SCNC: 138 MMOL/L (ref 136–145)
SODIUM SERPL-SCNC: 143 MMOL/L (ref 136–145)
T&S EXPIRATION DATE: NORMAL
WBC NRBC COR # BLD: 15.7 10*3/MM3 (ref 3.4–10.8)

## 2022-04-16 PROCEDURE — 36430 TRANSFUSION BLD/BLD COMPNT: CPT

## 2022-04-16 PROCEDURE — 85025 COMPLETE CBC W/AUTO DIFF WBC: CPT | Performed by: FAMILY MEDICINE

## 2022-04-16 PROCEDURE — 86900 BLOOD TYPING SEROLOGIC ABO: CPT

## 2022-04-16 PROCEDURE — 86901 BLOOD TYPING SEROLOGIC RH(D): CPT

## 2022-04-16 PROCEDURE — 85014 HEMATOCRIT: CPT | Performed by: FAMILY MEDICINE

## 2022-04-16 PROCEDURE — 84100 ASSAY OF PHOSPHORUS: CPT | Performed by: FAMILY MEDICINE

## 2022-04-16 PROCEDURE — 86923 COMPATIBILITY TEST ELECTRIC: CPT

## 2022-04-16 PROCEDURE — 70450 CT HEAD/BRAIN W/O DYE: CPT

## 2022-04-16 PROCEDURE — 99291 CRITICAL CARE FIRST HOUR: CPT | Performed by: PSYCHIATRY & NEUROLOGY

## 2022-04-16 PROCEDURE — 74018 RADEX ABDOMEN 1 VIEW: CPT

## 2022-04-16 PROCEDURE — P9016 RBC LEUKOCYTES REDUCED: HCPCS

## 2022-04-16 PROCEDURE — C1751 CATH, INF, PER/CENT/MIDLINE: HCPCS

## 2022-04-16 PROCEDURE — 80053 COMPREHEN METABOLIC PANEL: CPT | Performed by: FAMILY MEDICINE

## 2022-04-16 PROCEDURE — 86901 BLOOD TYPING SEROLOGIC RH(D): CPT | Performed by: FAMILY MEDICINE

## 2022-04-16 PROCEDURE — 87040 BLOOD CULTURE FOR BACTERIA: CPT | Performed by: FAMILY MEDICINE

## 2022-04-16 PROCEDURE — 25010000002 ONDANSETRON PER 1 MG: Performed by: INTERNAL MEDICINE

## 2022-04-16 PROCEDURE — 0 POTASSIUM CHLORIDE 10 MEQ/100ML SOLUTION: Performed by: FAMILY MEDICINE

## 2022-04-16 PROCEDURE — 25010000002 ERTAPENEM PER 500 MG: Performed by: FAMILY MEDICINE

## 2022-04-16 PROCEDURE — 02HV33Z INSERTION OF INFUSION DEVICE INTO SUPERIOR VENA CAVA, PERCUTANEOUS APPROACH: ICD-10-PCS | Performed by: FAMILY MEDICINE

## 2022-04-16 PROCEDURE — 25010000002 MAGNESIUM SULFATE 2 GM/50ML SOLUTION: Performed by: FAMILY MEDICINE

## 2022-04-16 PROCEDURE — 83735 ASSAY OF MAGNESIUM: CPT | Performed by: FAMILY MEDICINE

## 2022-04-16 PROCEDURE — 85018 HEMOGLOBIN: CPT | Performed by: FAMILY MEDICINE

## 2022-04-16 PROCEDURE — 70551 MRI BRAIN STEM W/O DYE: CPT

## 2022-04-16 PROCEDURE — B548ZZA ULTRASONOGRAPHY OF SUPERIOR VENA CAVA, GUIDANCE: ICD-10-PCS | Performed by: FAMILY MEDICINE

## 2022-04-16 PROCEDURE — 86900 BLOOD TYPING SEROLOGIC ABO: CPT | Performed by: FAMILY MEDICINE

## 2022-04-16 PROCEDURE — 86850 RBC ANTIBODY SCREEN: CPT | Performed by: FAMILY MEDICINE

## 2022-04-16 PROCEDURE — 82962 GLUCOSE BLOOD TEST: CPT

## 2022-04-16 RX ORDER — POTASSIUM CHLORIDE 1.5 G/1.77G
40 POWDER, FOR SOLUTION ORAL AS NEEDED
Status: DISCONTINUED | OUTPATIENT
Start: 2022-04-16 | End: 2022-04-21 | Stop reason: HOSPADM

## 2022-04-16 RX ORDER — POTASSIUM CHLORIDE 7.45 MG/ML
10 INJECTION INTRAVENOUS
Status: DISCONTINUED | OUTPATIENT
Start: 2022-04-16 | End: 2022-04-21 | Stop reason: HOSPADM

## 2022-04-16 RX ORDER — NOREPINEPHRINE BIT/0.9 % NACL 8 MG/250ML
.02-.3 INFUSION BOTTLE (ML) INTRAVENOUS
Status: DISCONTINUED | OUTPATIENT
Start: 2022-04-16 | End: 2022-04-18

## 2022-04-16 RX ORDER — POTASSIUM CHLORIDE 750 MG/1
40 CAPSULE, EXTENDED RELEASE ORAL AS NEEDED
Status: DISCONTINUED | OUTPATIENT
Start: 2022-04-16 | End: 2022-04-21 | Stop reason: HOSPADM

## 2022-04-16 RX ORDER — FAMOTIDINE 10 MG/ML
20 INJECTION, SOLUTION INTRAVENOUS DAILY
Status: DISCONTINUED | OUTPATIENT
Start: 2022-04-17 | End: 2022-04-17

## 2022-04-16 RX ORDER — MAGNESIUM SULFATE HEPTAHYDRATE 40 MG/ML
2 INJECTION, SOLUTION INTRAVENOUS ONCE
Status: COMPLETED | OUTPATIENT
Start: 2022-04-16 | End: 2022-04-16

## 2022-04-16 RX ADMIN — SODIUM BICARBONATE 650 MG: 650 TABLET ORAL at 12:22

## 2022-04-16 RX ADMIN — POTASSIUM CHLORIDE 10 MEQ: 7.46 INJECTION, SOLUTION INTRAVENOUS at 22:58

## 2022-04-16 RX ADMIN — Medication 10 ML: at 12:30

## 2022-04-16 RX ADMIN — FLUTICASONE PROPIONATE 2 SPRAY: 50 SPRAY, METERED NASAL at 20:25

## 2022-04-16 RX ADMIN — POTASSIUM CHLORIDE 10 MEQ: 7.46 INJECTION, SOLUTION INTRAVENOUS at 21:54

## 2022-04-16 RX ADMIN — POTASSIUM PHOSPHATE, MONOBASIC AND POTASSIUM PHOSPHATE, DIBASIC 30 MMOL: 224; 236 INJECTION, SOLUTION, CONCENTRATE INTRAVENOUS at 19:00

## 2022-04-16 RX ADMIN — POTASSIUM CHLORIDE 10 MEQ: 7.46 INJECTION, SOLUTION INTRAVENOUS at 19:00

## 2022-04-16 RX ADMIN — FAMOTIDINE 20 MG: 10 INJECTION INTRAVENOUS at 12:22

## 2022-04-16 RX ADMIN — NEOMYCIN SULFATE, POLYMYXIN B SULFATE AND HYDROCORTISONE 4 DROP: 10; 3.5; 1 SUSPENSION/ DROPS AURICULAR (OTIC) at 18:59

## 2022-04-16 RX ADMIN — ONDANSETRON 4 MG: 2 INJECTION INTRAMUSCULAR; INTRAVENOUS at 10:00

## 2022-04-16 RX ADMIN — ERTAPENEM SODIUM 1 G: 1 INJECTION, POWDER, LYOPHILIZED, FOR SOLUTION INTRAMUSCULAR; INTRAVENOUS at 18:59

## 2022-04-16 RX ADMIN — SODIUM CHLORIDE, POTASSIUM CHLORIDE, SODIUM LACTATE AND CALCIUM CHLORIDE 2000 ML: 600; 310; 30; 20 INJECTION, SOLUTION INTRAVENOUS at 03:00

## 2022-04-16 RX ADMIN — NEOMYCIN SULFATE, POLYMYXIN B SULFATE AND HYDROCORTISONE 4 DROP: 10; 3.5; 1 SUSPENSION/ DROPS AURICULAR (OTIC) at 07:50

## 2022-04-16 RX ADMIN — MAGNESIUM SULFATE HEPTAHYDRATE 2 G: 2 INJECTION, SOLUTION INTRAVENOUS at 19:01

## 2022-04-16 RX ADMIN — Medication 10 ML: at 20:51

## 2022-04-16 RX ADMIN — SODIUM BICARBONATE 100 MEQ: 84 INJECTION INTRAVENOUS at 22:07

## 2022-04-16 RX ADMIN — SODIUM CHLORIDE, POTASSIUM CHLORIDE, SODIUM LACTATE AND CALCIUM CHLORIDE 500 ML: 600; 310; 30; 20 INJECTION, SOLUTION INTRAVENOUS at 05:20

## 2022-04-16 RX ADMIN — SODIUM BICARBONATE 100 MEQ: 84 INJECTION INTRAVENOUS at 12:06

## 2022-04-16 RX ADMIN — Medication 2 SPRAY: at 20:51

## 2022-04-16 RX ADMIN — NEOMYCIN SULFATE, POLYMYXIN B SULFATE AND HYDROCORTISONE 4 DROP: 10; 3.5; 1 SUSPENSION/ DROPS AURICULAR (OTIC) at 12:23

## 2022-04-16 RX ADMIN — Medication 0.02 MCG/KG/MIN: at 06:38

## 2022-04-16 RX ADMIN — POTASSIUM CHLORIDE 10 MEQ: 7.46 INJECTION, SOLUTION INTRAVENOUS at 20:17

## 2022-04-16 NOTE — PROGRESS NOTES
"Infectious Diseases Progress Note    Patient:  Namita Zabala  YOB: 1975  MRN: [id number]   Admit date: 4/2/2022   Admitting Physician: Amado Villarreal MD  Primary Care Physician: David Lara MD    Chief Complaint/Interval History: She had a change in mental status earlier this morning.  Per discussion with nursing she became less responsive.  Does not appear she received any new medication.  She has not had fever.  She has excellent oxygen saturation on room air.  Blood pressure stable at present.  She had had hypotension earlier this morning.  Neurology has been consulted.  CT has been ordered.    Intake/Output Summary (Last 24 hours) at 4/16/2022 1051  Last data filed at 4/16/2022 0730  Gross per 24 hour   Intake 1950 ml   Output 400 ml   Net 1550 ml     Allergies:   Allergies   Allergen Reactions   • Coconut Unknown - High Severity   • Nuts Unknown - High Severity   • Penicillins    • [place] Other (See Comments)     Causes migraines per pt reports     Current Scheduled Medications:   ertapenem, 1 g, Intravenous, Q24H  famotidine, 20 mg, Intravenous, Q12H  FLUoxetine, 20 mg, Oral, Nightly  fluticasone, 2 spray, Each Nare, BID  folic acid, 1 mg, Oral, Daily  neomycin-polymyxin-hydrocortisone, 4 drop, Both Ears, Q6H  oxymetazoline, 2 spray, Nasal, BID  sodium bicarbonate, 650 mg, Oral, BID  sodium chloride, 10 mL, Intravenous, Q12H  SUMAtriptan, 50 mg, Oral, Once  vancomycin, 750 mg, Intravenous, Q24H      Current PRN Medications:  hydrALAZINE  •  labetalol  •  ondansetron **OR** ondansetron  •  Pharmacy to Dose enoxaparin (LOVENOX)  •  sodium chloride  •  [COMPLETED] Insert peripheral IV **AND** sodium chloride  •  [COMPLETED] Insert peripheral IV **AND** sodium chloride  •  sodium chloride    Review of Systems unobtainable from patient due to mental status    Vital Signs:  /66   Pulse 100   Temp 97.8 °F (36.6 °C) (Axillary)   Resp 13   Ht 170.2 cm (67\")   Wt 65.4 kg (144 " lb 3.2 oz)   SpO2 100%   BMI 22.58 kg/m²     Physical Exam  Vital signs - reviewed.  Line/IV site - No erythema, warmth, induration, or tenderness.  Open eyes some but takes a lot of stimulus  Pupil 6 to 7 mm round and symmetrical.  Reactive to light.  She will withdraw extremities some and response to stimuli although not moving much  Skin without rash  Abdomen nondistended    Lab Results:  CBC:   Results from last 7 days   Lab Units 04/16/22  0200 04/15/22  1150 04/14/22  1116 04/13/22  1128 04/12/22  0622 04/11/22  0752 04/10/22  0920   WBC 10*3/mm3 15.70* 15.69* 10.54 19.48* 8.15 8.88 11.73*   HEMOGLOBIN g/dL 4.8* 7.2* 9.9* 11.1* 10.1* 9.7* 10.3*   HEMATOCRIT % 15.8* 21.4* 29.6* 32.7* 30.9* 28.5* 30.7*   PLATELETS 10*3/mm3 301 347 354 373 287 256 241     BMP:  Results from last 7 days   Lab Units 04/16/22  0112 04/15/22  2233 04/14/22  1116 04/13/22  0725 04/12/22  0622 04/11/22  0752 04/10/22  0920   SODIUM mmol/L 138 137 137 134* 141 139 139   POTASSIUM mmol/L 3.6 3.6 3.4* 3.8 3.9 3.3* 4.8   CHLORIDE mmol/L 103 102 99 99 105 106 108*   CO2 mmol/L 17.0* 21.0* 25.0 18.0* 24.0 23.0 15.0*   BUN mg/dL 17 16 7 8 7 7 11   CREATININE mg/dL 2.25* 2.15* 0.72 0.70 0.71 0.70 0.82   GLUCOSE mg/dL 195* 137* 110* 83 79 102* 101*   CALCIUM mg/dL 7.7* 7.9* 8.8 8.9 8.6 8.3* 9.1   ALT (SGPT) U/L 12 11 13  --   --  9  --      Culture Results:   Blood Culture   Date Value Ref Range Status   04/14/2022 No growth at 24 hours  Preliminary     Urine Culture   Date Value Ref Range Status   04/14/2022 >100,000 CFU/mL Gram Negative Bacilli (A)  Preliminary   Susceptibility     Proteus mirabilis Klebsiella oxytoca ESBL     SANDEEP SANDEEP     Amikacin   <=2 ug/ml Susceptible     Ampicillin >=32 ug/ml Resistant       Ampicillin + Sulbactam 16 ug/ml Intermediate       Cefazolin 8 ug/ml Susceptible       Cefepime <=1 ug/ml Susceptible       Ceftazidime <=1 ug/ml Susceptible       Ceftriaxone <=1 ug/ml Susceptible       Ertapenem   <=0.5 ug/ml  Susceptible     Gentamicin 4 ug/ml Susceptible >=16 ug/ml Resistant     Levofloxacin >=8 ug/ml Resistant 1 ug/ml Intermediate     Meropenem   <=0.25 ug/ml Susceptible     Nitrofurantoin 64 ug/ml Resistant 64 ug/ml Intermediate     Piperacillin + Tazobactam <=4 ug/ml Susceptible <=4 ug/ml Susceptible     Tobramycin   8 ug/ml Intermediate     Trimethoprim + Sulfamethoxazole >=320 ug/ml Resistant <=20 ug/ml Susceptible      Radiology: None  Additional Studies Reviewed: None    Impression:   1.  Change in mental [id number] in progress.  Head CT ordered.  Neurology consulted as well.  She appears to have had some transient hypotension between 6 and 7 this morning.  Wonder about the possibility of a watershed type event.  2.  She had intractable nausea/vomiting-quiescent at present  3.  UTI-under treatment  4.  Positive blood culture-appears to be contaminant  5.  Acute renal dysfunction.  Creatinine from yesterday to today.  Stable.    Recommendations:   Continue ertapenem  Discontinue vancomycin  Head CT today  Continue to follow    Vijay Russell MD

## 2022-04-16 NOTE — CONSULTS
"Pharmacy Dosing Service  Pharmacokinetics  Vancomycin Initial Evaluation  Assessment/Action/Plan:  Loading dose?: 1250mg  Current Order: Vancomycin 750 mg IVPB every 24 hours  Current end date:4/20  Levels: not ordered  Additional antimicrobial agent(s): Invanz    Vancomycin dosage initiated based on population pharmacokinetic parameters. Pharmacy will continue to follow daily and adjust dose accordingly.     Subjective:  Namita Zabala is a 46 y.o. female with a Vancomycin \"Pharmacy to Dose\" consult for the treatment of SSTI , day 1 of 5 of treatment.    AUC Model Data:  Loading dose: 1250mg  Regimen: 750 mg IV every 24 hours.  Start time: 03:05 on 04/16/2022  Exposure target: AUC24 (range)400-600 mg/L.hr   AUC24,ss: 446 mg/L.hr  PAUC*: 62 %  Ctrough,ss: 14.5 mg/L  Pconc*: 21 %  Tox.: 10 %      Objective:  Ht: 170.2 cm (67\"); Wt: 67.5 kg (148 lb 14.4 oz)  Estimated Creatinine Clearance: 34 mL/min (A) (by C-G formula based on SCr of 2.25 mg/dL (H)).   Creatinine   Date Value Ref Range Status   04/16/2022 2.25 (H) 0.57 - 1.00 mg/dL Final   04/15/2022 2.15 (H) 0.57 - 1.00 mg/dL Final   04/14/2022 0.72 0.57 - 1.00 mg/dL Final   01/25/2022 0.69 0.57 - 1.11 mg/dL Final   01/24/2022 0.67 0.57 - 1.11 mg/dL Final   01/23/2022 0.69 0.57 - 1.11 mg/dL Final   04/25/2019 1 (H) 0.5 - 0.9 mg/dL Final      Lab Results   Component Value Date    WBC 15.69 (H) 04/15/2022    WBC 10.54 04/14/2022    WBC 19.48 (H) 04/13/2022      Baseline culture results:  Microbiology Results (last 10 days)       Procedure Component Value - Date/Time    Blood Culture With AASHISH - Blood, Arm, Right [[id number]]  (Normal) Collected: 04/14/22 1938    Lab Status: Preliminary result Specimen: Blood from Arm, Right Updated: 04/15/22 2015     Blood Culture No growth at 24 hours    Urine Culture - Urine, Urine, Catheter In/Out [[id number]]  (Abnormal) Collected: 04/14/22 1025    Lab Status: Preliminary result Specimen: Urine, Catheter In/Out Updated: " 04/15/22 1311     Urine Culture >100,000 CFU/mL Gram Negative Bacilli            Cielo Burt, [facility]  04/16/22 02:04 CDT

## 2022-04-16 NOTE — PAYOR COMM NOTE
"4/16 CLINICAL UPDATE  MOVED TO CCU  UR  [phone]    [id number]  Namita Cooper (46 y.o. Female)             Date of Birth   1975    Social Security Number       Address   [address] [place] [address]    Home Phone   [phone]    MRN   [id number]       Rastafari   Mormonism    Marital Status                               Admission Date   4/2/22    Admission Type   Emergency    Admitting Provider   Amado Villarreal MD    Attending Provider   Amado Villarreal MD    Department, Room/Bed   [facility], C001/1       Discharge Date       Discharge Disposition       Discharge Destination                               Attending Provider: Amado Villarreal MD    Allergies: Coconut, Nuts, Penicillins, [place]    Isolation: Contact   Infection: COVID (History) (09/15/21), ESBL Klebsiella (04/07/22)   Code Status: CPR   Advance Care Planning Activity    Ht: 170.2 cm (67\")   Wt: 65.4 kg (144 lb 3.2 oz)    Admission Cmt: None   Principal Problem: Intractable nausea and vomiting [R11.2]                 Active Insurance as of 4/2/2022     Primary Coverage     Payor Plan Insurance Group Employer/Plan Group    ANTHEM BLUE CROSS ANTHEM BLUE CROSS BLUE SHIELD PPO [id number]     Payor Plan Address Payor Plan Phone Number Payor Plan Fax Number Effective Dates    [address] [phone]  1/1/2022 - None Entered    [facility] [id number]       Subscriber Name Subscriber Birth Date Member ID       BRANDON COOPER GEMA 1975 [id number]           Secondary Coverage     Payor Plan Insurance Group Employer/Plan Group    MEDICARE MEDICARE A & B      Payor Plan Address Payor Plan Phone Number Payor Plan Fax Number Effective Dates    [address] [phone]  7/1/2019 - None Entered    [facility] [id number]       Subscriber Name Subscriber Birth Date Member ID       NAMITA COOPER MARCELO 1975 [id number]                 Emergency Contacts      (Rel.) Home Phone Work Phone " Mobile Phone    Grant Zabala (Spouse) -- -- [phone]    Noel Johnson (Father) [phone] -- [phone]    Marquita Johnson (Mother) -- -- [phone]    Neida Zabala -- -- [phone]              Current Facility-Administered Medications   Medication Dose Route Frequency Provider Last Rate Last Admin   • ertapenem (INVanz) 1 g in sodium chloride 0.9 % 100 mL IVPB-VTB  1 g Intravenous Q24H Amado Villarreal  mL/hr at 04/15/22 1549 1 g at 04/15/22 1549   • [START ON 4/17/2022] famotidine (PEPCID) injection 20 mg  20 mg Intravenous Daily Amado Villarreal MD       • FLUoxetine (PROzac) capsule 20 mg  20 mg Oral Nightly Amado Villarreal MD   20 mg at 04/14/22 2200   • fluticasone (FLONASE) 50 MCG/ACT nasal spray 2 spray  2 spray Each Nare BID Frank Ricks MD   2 spray at 04/15/22 2331   • folic acid (FOLVITE) tablet 1 mg  1 mg Oral Daily Amado Villarreal MD   1 mg at 04/14/22 2200   • hydrALAZINE (APRESOLINE) injection 10 mg  10 mg Intravenous Q4H PRN Amado Villarreal MD       • labetalol (NORMODYNE,TRANDATE) injection 10 mg  10 mg Intravenous Q4H PRN Amado Villarreal MD   10 mg at 04/10/22 1518   • magnesium sulfate 2g/50 mL (PREMIX) infusion  2 g Intravenous Once Amado Villarreal MD       • neomycin-polymyxin-hydrocortisone (CORTISPORIN) otic suspension 4 drop  4 drop Both Ears Q6H Fermin Mcclure DO   4 drop at 04/16/22 1223   • norepinephrine (LEVOPHED) 8 mg in 250 mL NS infusion (premix)  0.02-0.3 mcg/kg/min Intravenous Titrated Rajwinder Amaya APRN 4.91 mL/hr at 04/16/22 0645 0.04 mcg/kg/min at 04/16/22 0645   • ondansetron (ZOFRAN) tablet 4 mg  4 mg Oral Q6H PRN Frank Ricks MD        Or   • ondansetron (ZOFRAN) injection 4 mg  4 mg Intravenous Q6H PRN Frank Ricks MD   4 mg at 04/16/22 1000   • oxymetazoline (AFRIN) nasal spray 2 spray  2 spray Nasal BID Frank Ricks MD   2 spray at 04/15/22 2331   • potassium chloride (MICRO-K) CR capsule 40 mEq  40 mEq Oral PRN Phil  Amado ZELAYA MD        Or   • potassium chloride (KLOR-CON) packet 40 mEq  40 mEq Oral PRN Amado Villarreal MD        Or   • potassium chloride 10 mEq in 100 mL IVPB  10 mEq Intravenous Q1H PRN Amado Villarreal MD       • potassium phosphate 45 mmol in sodium chloride 0.9 % 500 mL infusion  45 mmol Intravenous PRN Amado Villarreal MD        Or   • potassium phosphate 30 mmol in sodium chloride 0.9 % 250 mL infusion  30 mmol Intravenous PRN Amado Villarreal MD        Or   • Potassium Phosphates 15 mmol in sodium chloride 0.9 % 100 mL infusion  15 mmol Intravenous PRN Amado Villarreal MD        Or   • sodium phosphates 45 mmol in sodium chloride 0.9 % 500 mL IVPB  45 mmol Intravenous PRN Amado Villarreal MD        Or   • sodium phosphates 30 mmol in sodium chloride 0.9 % 250 mL IVPB  30 mmol Intravenous PRN Amado Villarreal MD        Or   • sodium phosphates 15 mmol in sodium chloride 0.9 % 250 mL IVPB  15 mmol Intravenous PRN Amado Villarreal MD       • sodium bicarbonate 8.4 % 100 mEq in dextrose (D5W) 5 % 1,000 mL infusion (greater than 75 mEq)  100 mEq Intravenous Continuous Amado Villarreal  mL/hr at 04/16/22 1206 100 mEq at 04/16/22 1206   • sodium bicarbonate tablet 650 mg  650 mg Oral BID Amado Villarreal MD   650 mg at 04/16/22 1222   • sodium chloride 0.9 % flush 10 mL  10 mL Intravenous PRN Frank Ricks MD       • sodium chloride 0.9 % flush 10 mL  10 mL Intravenous PRN Frank Ricks MD       • sodium chloride 0.9 % flush 10 mL  10 mL Intravenous PRN Frank Ricks MD       • sodium chloride 0.9 % flush 10 mL  10 mL Intravenous Q12H Frank Ricks MD   10 mL at 04/16/22 1230   • sodium chloride 0.9 % flush 10 mL  10 mL Intravenous PRN Frank Ricks MD   10 mL at 04/10/22 1518   • SUMAtriptan (IMITREX) tablet 50 mg  50 mg Oral Once Frank Ricks MD         Orders (last 24 hrs)      Start     Ordered    04/17/22 0900  famotidine (PEPCID) injection 20 mg  Daily         04/16/22 7001     "04/17/22 0600  Magnesium  Morning Draw         04/16/22 1606    04/17/22 0600  Phosphorus  Morning Draw         04/16/22 1607    04/17/22 0000  EEG  Once         04/16/22 1713    04/16/22 2300  vancomycin 750 mg/250 mL 0.9% NS IVPB ([facility])  Every 24 Hours,   Status:  Discontinued         04/16/22 0203    04/16/22 1700  magnesium sulfate 2g/50 mL (PREMIX) infusion  Once         04/16/22 1606    04/16/22 1700  MRI Brain Without Contrast  1 Time Imaging         04/16/22 1640    04/16/22 1639  MRI Brain With & Without Contrast  1 Time Imaging,   Status:  Canceled         04/16/22 1640    04/16/22 1607  Patient Currently On Electrolyte Replacement Protocol - Please Refer to MAR for Protocol Details  Misc Nursing Order (Specify)  Daily      Comments: Patient Currently On Electrolyte Replacement Protocol - Please Refer to MAR for Protocol Details    04/16/22 1606    04/16/22 1606  Initiate Electrolyte Replacement Protocol  Until Discontinued         04/16/22 1606    04/16/22 1605  potassium phosphate 45 mmol in sodium chloride 0.9 % 500 mL infusion  As Needed        \"Or\" Linked Group Details    04/16/22 1606    04/16/22 1605  potassium phosphate 30 mmol in sodium chloride 0.9 % 250 mL infusion  As Needed        \"Or\" Linked Group Details    04/16/22 1606    04/16/22 1605  Potassium Phosphates 15 mmol in sodium chloride 0.9 % 100 mL infusion  As Needed        \"Or\" Linked Group Details    04/16/22 1606    04/16/22 1605  sodium phosphates 45 mmol in sodium chloride 0.9 % 500 mL IVPB  As Needed        \"Or\" Linked Group Details    04/16/22 1606    04/16/22 1605  sodium phosphates 30 mmol in sodium chloride 0.9 % 250 mL IVPB  As Needed        \"Or\" Linked Group Details    04/16/22 1606    04/16/22 1605  sodium phosphates 15 mmol in sodium chloride 0.9 % 250 mL IVPB  As Needed        \"Or\" Linked Group Details    04/16/22 1606    04/16/22 1605  potassium chloride (MICRO-K) CR capsule 40 mEq  As Needed        \"Or\" Linked Group Details " "   04/16/22 1606    04/16/22 1605  potassium chloride (KLOR-CON) packet 40 mEq  As Needed        \"Or\" Linked Group Details    04/16/22 1606    04/16/22 1605  potassium chloride 10 mEq in 100 mL IVPB  Every 1 Hour PRN        \"Or\" Linked Group Details    04/16/22 1606    04/16/22 1255  Hemoglobin & Hematocrit, Blood  Timed         04/16/22 1155    04/16/22 1100  sodium bicarbonate 8.4 % 75 mEq in dextrose (D5W) 5 % 1,000 mL infusion (less than/equal to 75 mEq)  Continuous,   Status:  Discontinued         04/16/22 1000    04/16/22 1100  sodium bicarbonate 8.4 % 100 mEq in dextrose (D5W) 5 % 1,000 mL infusion (greater than 75 mEq)  Continuous         04/16/22 1001    04/16/22 1002  CT Head Without Contrast  1 Time Imaging         04/16/22 1002    04/16/22 0959  Insert Indwelling Urinary Catheter  Once        \"And\" Linked Group Details    04/16/22 0958    04/16/22 0959  Assess Need for Indwelling Urinary Catheter - Follow Removal Protocol  Continuous        Comments: Indwelling Urinary Catheter Removal Criteria  Discontinue Indwelling Urinary Catheter Unless One of the Following is Present:  Urinary Retention or Obstruction  Chronic Urinary Catheter Use  End of Life  Critical Illness with Strict I/O   Tract or Abdominal Surgery  Stage 3/4 Sacral / Perineal Wound  Required Activity Restriction: Trauma  Required Activity Restriction: Spine Surgery  If Patient is Being Followed by Urology Contact Them PRIOR to Removal  Do Not Remove Indwelling Urinary Catheter er Order is Present with a CLINICAL REASON to Maintain the Catheter. Provider is Required to Include a Clinical Reason to Maintain a Urinary Catheter    Patient Admitted With Indwelling Urinary Catheter (Not Placed at [facility])  Assess for Continued Need & Document Medical Necessity  If Infection is Suspected, Contact the Provider       \"And\" Linked Group Details    04/16/22 0958    04/16/22 0959  Urinary Catheter Care  Every Shift      \"And\" Linked Group " Details    04/16/22 0958    04/16/22 0959  XR Abdomen KUB  1 Time Imaging         04/16/22 0958    04/16/22 0758  Inpatient Neurology Consult General  Once        Specialty:  Neurology  Provider:  Abiola Yan MD    04/16/22 0758    04/16/22 0736  Restraints Non-Violent or Non-Self Destructive  Calendar Day,   Status:  Canceled         04/16/22 0735    04/16/22 0736  Case Management  Consult  Once,   Status:  Canceled        Provider:  (Not yet assigned)    04/16/22 0735    04/16/22 0736  Inpatient General Surgery Consult  Once        Specialty:  General Surgery  Provider:  Tiffanie Scott MD    04/16/22 0735    04/16/22 0715  norepinephrine (LEVOPHED) 8 mg in 250 mL NS infusion (premix)  Titrated         04/16/22 0626    04/16/22 0647  Phosphorus  STAT         04/16/22 0646    04/16/22 0646  Basic Metabolic Panel  STAT         04/16/22 0646    04/16/22 0646  Magnesium  STAT         04/16/22 0646    04/16/22 0629  Insert Central Line At Bedside  Once        Comments: This order was created via procedure documentation    04/16/22 0628    04/16/22 0600  CBC Auto Differential  PROCEDURE ONCE         04/15/22 2201    04/16/22 0545  lactated ringers bolus 500 mL  Once         04/16/22 0455    04/16/22 0417  Type & Screen  STAT         04/16/22 0236    04/16/22 0236  Verify Informed Consent for Blood Product Administration  Once         04/16/22 0236    04/16/22 0236  Prepare RBC, 2 Units  Blood - Once         04/16/22 0236    04/16/22 0235  Transfuse RBC, 2 Units  Transfusion       04/16/22 0236    04/16/22 0230  lactated ringers bolus 2,000 mL  Once         04/16/22 0137    04/16/22 0152  POC Glucose Once  PROCEDURE ONCE         04/16/22 0139    04/16/22 0138  Transfer Patient  Once         04/16/22 0137    04/16/22 0137  Manual Differential  Once,   Status:  Canceled         04/16/22 0136    04/15/22 2315  vancomycin 1250 mg/250 mL 0.9% NS IVPB ([facility])  Once         04/15/22 2218    04/15/22  "2219  Blood Culture - Blood,  Daily        \"And\" Linked Group Details    04/15/22 2218    04/15/22 2218  Pharmacy to dose vancomycin  Continuous PRN,   Status:  Discontinued         04/15/22 2218    04/15/22 2218  Blood Culture - Blood, Arm, Left  Once        Comments: From a different site than #1.     \"And\" Linked Group Details    04/15/22 2218    04/15/22 2130  iopamidol (ISOVUE-300) 61 % injection 100 mL  Once in Imaging         04/15/22 2031    04/15/22 2006  Respiratory Therapy to Obtain Blood Via Arterial Puncture  Once        Comments: For CMP    04/15/22 2012    04/15/22 1842  CT Abdomen Pelvis With Contrast  1 Time Imaging         04/15/22 1842    04/15/22 1822  US Pelvis Complete  1 Time Imaging,   Status:  Canceled         04/15/22 1822    04/15/22 0600  enoxaparin (LOVENOX) syringe 70 mg  Every 12 Hours,   Status:  Discontinued         04/14/22 0221 04/14/22 2000  folic acid (FOLVITE) tablet 1 mg  Daily         04/14/22 1841 04/14/22 1906  Urinary Catheter Care  Every Shift,   Status:  Canceled      \"And\" Linked Group Details    04/14/22 1917    04/14/22 1841  sennosides-docusate (PERICOLACE) 8.6-50 MG per tablet 2 tablet  Daily,   Status:  Discontinued         04/14/22 1841 04/14/22 1615  ertapenem (INVanz) 1 g in sodium chloride 0.9 % 100 mL IVPB-VTB  Every 24 Hours         04/14/22 1517    04/14/22 1445  lactated ringers infusion  Continuous,   Status:  Discontinued         04/14/22 1348    04/14/22 1121  Daily Weights  Daily       04/14/22 1120    04/14/22 1119  Strict Intake & Output  Every Shift       04/14/22 1120    04/14/22 0940  CBC & Differential  Daily       04/14/22 0939    04/14/22 0940  Comprehensive Metabolic Panel  Daily       04/14/22 0939    04/14/22 0900  famotidine (PEPCID) injection 20 mg  Every 12 Hours Scheduled,   Status:  Discontinued         04/14/22 0745    04/13/22 2316  Pharmacy to Dose enoxaparin (LOVENOX)  Continuous PRN,   Status:  Discontinued         04/13/22 " "2317    04/13/22 1015  sodium bicarbonate tablet 650 mg  2 Times Daily         04/13/22 0921    04/11/22 1800  Dietary Nutrition Supplements Magic Cup  Daily With Breakfast, Lunch & Dinner       04/11/22 1428    04/11/22 0900  metoprolol succinate XL (TOPROL-XL) 24 hr tablet 50 mg  Every 24 Hours Scheduled,   Status:  Discontinued         04/11/22 0757    04/10/22 2100  FLUoxetine (PROzac) capsule 20 mg  Nightly         04/10/22 1104    04/10/22 1453  hydrALAZINE (APRESOLINE) injection 10 mg  Every 4 Hours PRN         04/10/22 1454    04/10/22 1453  labetalol (NORMODYNE,TRANDATE) injection 10 mg  Every 4 Hours PRN         04/10/22 1453    04/10/22 1200  bisacodyl (DULCOLAX) suppository 10 mg  Daily,   Status:  Discontinued         04/10/22 1113    04/03/22 1445  neomycin-polymyxin-hydrocortisone (CORTISPORIN) otic suspension 4 drop  Every 6 Hours Scheduled         04/03/22 1356    04/03/22 0900  sodium chloride 0.9 % flush 10 mL  Every 12 Hours Scheduled         04/03/22 0411    04/03/22 0900  fluticasone (FLONASE) 50 MCG/ACT nasal spray 2 spray  2 Times Daily         04/03/22 0435    04/03/22 0800  Vital Signs  Every 4 Hours       04/03/22 0411    04/03/22 0600  Incentive Spirometry  Every 4 Hours While Awake       04/03/22 0411    04/03/22 0530  oxymetazoline (AFRIN) nasal spray 2 spray  2 Times Daily         04/03/22 0435    04/03/22 0500  SUMAtriptan (IMITREX) tablet 50 mg  Once         04/03/22 0411    04/03/22 0409  ondansetron (ZOFRAN) tablet 4 mg  Every 6 Hours PRN        \"Or\" Linked Group Details    04/03/22 0411    04/03/22 0409  ondansetron (ZOFRAN) injection 4 mg  Every 6 Hours PRN        \"Or\" Linked Group Details    04/03/22 0411    04/03/22 0406  Intake & Output  Every Shift       04/03/22 0411    04/03/22 0405  sodium chloride 0.9 % flush 10 mL  As Needed         04/03/22 0411    04/02/22 2122  sodium chloride 0.9 % flush 10 mL  As Needed        \"And\" Linked Group Details    04/02/22 2123 04/02/22 " "2110  sodium chloride 0.9 % flush 10 mL  As Needed        \"And\" Linked Group Details    04/02/22 2110 04/02/22 2053  sodium chloride 0.9 % flush 10 mL  As Needed         04/02/22 2053    Unscheduled  Dima and Document Tube Depth (in cm)  As Needed       04/14/22 1120    Unscheduled  Verify Tube Placement Initially & As Needed  As Needed       04/14/22 1120    Unscheduled  Flush Feeding Tube With 30-50mL Water As Needed  As Needed       04/14/22 1120    Unscheduled  Oral Care  As Needed       04/14/22 1120    Unscheduled  Magnesium  As Needed       04/16/22 1606    Unscheduled  Potassium  As Needed       04/16/22 1606    --  metoclopramide (REGLAN) 5 MG/5ML solution  3 Times Daily Before Meals         04/03/22 0226    --  rizatriptan (MAXALT) 10 MG tablet  Once As Needed         04/03/22 0226    --  butalbital-acetaminophen-caffeine (FIORICET, ESGIC) -40 MG per tablet  Every 4 Hours PRN         04/03/22 0226    --  ondansetron ODT (ZOFRAN-ODT) 4 MG disintegrating tablet  4 Times Daily PRN         04/03/22 0226    --  eszopiclone (LUNESTA) 3 MG tablet  Nightly         04/03/22 0226    --  traZODone (DESYREL) 100 MG tablet  Nightly         04/03/22 0226    --  promethazine (PHENERGAN) 25 MG tablet  Every 6 Hours PRN         04/03/22 0226    --  methocarbamol (ROBAXIN) 500 MG tablet  3 Times Daily PRN         04/03/22 0226    --  prochlorperazine (COMPAZINE) 10 MG tablet  Every 8 Hours PRN         04/03/22 0226    --  FLUoxetine (PROzac) 40 MG capsule  Daily         04/03/22 0226    --  rivaroxaban (XARELTO) 20 MG tablet  Nightly         04/03/22 0226    --  oxymetazoline (AFRIN) 0.05 % nasal spray  2 Times Daily         04/03/22 0226    --  SCANNED - TELEMETRY           04/02/22 0000    --  Calcium Carbonate-Simethicone 750-80 MG chewable tablet  Daily         04/03/22 1548    --  pantoprazole (PROTONIX) 20 MG EC tablet  Daily         04/03/22 1602                   Physician Progress Notes (last 24 hours)    "   Vijay Gamble MD at 04/16/22 1051          Infectious Diseases Progress Note    Patient:  Namita Zabala  YOB: 1975  MRN: [id number]   Admit date: 4/2/2022   Admitting Physician: Amado Villarreal MD  Primary Care Physician: David Lara MD    Chief Complaint/Interval History: She had a change in mental status earlier this morning.  Per discussion with nursing she became less responsive.  Does not appear she received any new medication.  She has not had fever.  She has excellent oxygen saturation on room air.  Blood pressure stable at present.  She had had hypotension earlier this morning.  Neurology has been consulted.  CT has been ordered.    Intake/Output Summary (Last 24 hours) at 4/16/2022 1051  Last data filed at 4/16/2022 0730  Gross per 24 hour   Intake 1950 ml   Output 400 ml   Net 1550 ml     Allergies:   Allergies   Allergen Reactions   • Coconut Unknown - High Severity   • Nuts Unknown - High Severity   • Penicillins    • [place] Other (See Comments)     Causes migraines per pt reports     Current Scheduled Medications:   ertapenem, 1 g, Intravenous, Q24H  famotidine, 20 mg, Intravenous, Q12H  FLUoxetine, 20 mg, Oral, Nightly  fluticasone, 2 spray, Each Nare, BID  folic acid, 1 mg, Oral, Daily  neomycin-polymyxin-hydrocortisone, 4 drop, Both Ears, Q6H  oxymetazoline, 2 spray, Nasal, BID  sodium bicarbonate, 650 mg, Oral, BID  sodium chloride, 10 mL, Intravenous, Q12H  SUMAtriptan, 50 mg, Oral, Once  vancomycin, 750 mg, Intravenous, Q24H      Current PRN Medications:  hydrALAZINE  •  labetalol  •  ondansetron **OR** ondansetron  •  Pharmacy to Dose enoxaparin (LOVENOX)  •  sodium chloride  •  [COMPLETED] Insert peripheral IV **AND** sodium chloride  •  [COMPLETED] Insert peripheral IV **AND** sodium chloride  •  sodium chloride    Review of Systems unobtainable from patient due to mental status    Vital Signs:  /66   Pulse 100   Temp 97.8 °F (36.6 °C) (Axillary)    "Resp 13   Ht 170.2 cm (67\")   Wt 65.4 kg (144 lb 3.2 oz)   SpO2 100%   BMI 22.58 kg/m²     Physical Exam  Vital signs - reviewed.  Line/IV site - No erythema, warmth, induration, or tenderness.  Open eyes some but takes a lot of stimulus  Pupil 6 to 7 mm round and symmetrical.  Reactive to light.  She will withdraw extremities some and response to stimuli although not moving much  Skin without rash  Abdomen nondistended    Lab Results:  CBC:   Results from last 7 days   Lab Units 04/16/22  0200 04/15/22  1150 04/14/22  1116 04/13/22  1128 04/12/22  0622 04/11/22  0752 04/10/22  0920   WBC 10*3/mm3 15.70* 15.69* 10.54 19.48* 8.15 8.88 11.73*   HEMOGLOBIN g/dL 4.8* 7.2* 9.9* 11.1* 10.1* 9.7* 10.3*   HEMATOCRIT % 15.8* 21.4* 29.6* 32.7* 30.9* 28.5* 30.7*   PLATELETS 10*3/mm3 301 347 354 373 287 256 241     BMP:  Results from last 7 days   Lab Units 04/16/22  0112 04/15/22  2233 04/14/22  1116 04/13/22  0725 04/12/22  0622 04/11/22  0752 04/10/22  0920   SODIUM mmol/L 138 137 137 134* 141 139 139   POTASSIUM mmol/L 3.6 3.6 3.4* 3.8 3.9 3.3* 4.8   CHLORIDE mmol/L 103 102 99 99 105 106 108*   CO2 mmol/L 17.0* 21.0* 25.0 18.0* 24.0 23.0 15.0*   BUN mg/dL 17 16 7 8 7 7 11   CREATININE mg/dL 2.25* 2.15* 0.72 0.70 0.71 0.70 0.82   GLUCOSE mg/dL 195* 137* 110* 83 79 102* 101*   CALCIUM mg/dL 7.7* 7.9* 8.8 8.9 8.6 8.3* 9.1   ALT (SGPT) U/L 12 11 13  --   --  9  --      Culture Results:   Blood Culture   Date Value Ref Range Status   04/14/2022 No growth at 24 hours  Preliminary     Urine Culture   Date Value Ref Range Status   04/14/2022 >100,000 CFU/mL Gram Negative Bacilli (A)  Preliminary   Susceptibility     Proteus mirabilis Klebsiella oxytoca ESBL     SANDEEP SANDEEP     Amikacin   <=2 ug/ml Susceptible     Ampicillin >=32 ug/ml Resistant       Ampicillin + Sulbactam 16 ug/ml Intermediate       Cefazolin 8 ug/ml Susceptible       Cefepime <=1 ug/ml Susceptible       Ceftazidime <=1 ug/ml Susceptible       Ceftriaxone <=1 " ug/ml Susceptible       Ertapenem   <=0.5 ug/ml Susceptible     Gentamicin 4 ug/ml Susceptible >=16 ug/ml Resistant     Levofloxacin >=8 ug/ml Resistant 1 ug/ml Intermediate     Meropenem   <=0.25 ug/ml Susceptible     Nitrofurantoin 64 ug/ml Resistant 64 ug/ml Intermediate     Piperacillin + Tazobactam <=4 ug/ml Susceptible <=4 ug/ml Susceptible     Tobramycin   8 ug/ml Intermediate     Trimethoprim + Sulfamethoxazole >=320 ug/ml Resistant <=20 ug/ml Susceptible      Radiology: None  Additional Studies Reviewed: None    Impression:   1.  Change in mental [id number] in progress.  Head CT ordered.  Neurology consulted as well.  She appears to have had some transient hypotension between 6 and 7 this morning.  Wonder about the possibility of a watershed type event.  2.  She had intractable nausea/vomiting-quiescent at present  3.  UTI-under treatment  4.  Positive blood culture-appears to be contaminant  5.  Acute renal dysfunction.  Creatinine from yesterday to today.  Stable.    Recommendations:   Continue ertapenem  Discontinue vancomycin  Head CT today  Continue to follow    Vijay Russell MD    Electronically signed by Vijay Russell MD at 04/16/22 8330     Amado Villarreal MD at 04/16/22 0986              [facility] Medicine Services  INPATIENT PROGRESS NOTE    Length of Stay: 12  Date of Admission: 4/2/2022  Primary Care Physician: David Lara MD    Subjective   Chief Complaint: Failure to thrive/metabolic acidosis    HPI   Hemoglobin dropped to 4 last night, 2 units of blood is currently being given.  We will check hemoglobin after transfusion.  Abscess left thigh from CT scan, general surgery consult.  Call [place] today and keep him updated what is going on with the patient seems to change her mind to excepting.  Blood pressure is low, requiring Levophed.  Metabolic acidosis, change IV fluid to bicarb drip for now.  T-max 99.5.  T-current 97.8.  Altered  ental status change possibly from anemia, CT scan order after discussion with neurology.    Review of Systems   Patient is arousable, refusing to talk.  No sign of acute distress.    All pertinent negatives and positives are as above. All other systems have been reviewed and are negative unless otherwise stated.     Objective    Temp:  [95.6 °F (35.3 °C)-99.5 °F (37.5 °C)] 97.8 °F (36.6 °C)  Heart Rate:  [] 100  Resp:  [13-23] 13  BP: ()/(37-77) 119/66    Intake/Output Summary (Last 24 hours) at 4/16/2022 0948  Last data filed at 4/16/2022 0730  Gross per 24 hour   Intake 1950 ml   Output 400 ml   Net 1550 ml     Physical Exam  Constitutional:       Appearance: She is well-developed.   HENT:      Head: Normocephalic.   Eyes:      Conjunctiva/sclera: Conjunctivae normal.      Pupils: Pupils are equal, round, and reactive to light.   Neck:      Vascular: No JVD.   Cardiovascular:      Rate and Rhythm: Regular rhythm.  Rates in the 100 .     Heart sounds: Normal heart sounds. No murmur heard.    No friction rub. No gallop.   Pulmonary:      Effort: No respiratory distress.      Breath sounds: No wheezing or rales.      Comments: Diminished breath sound bilateral, clear .  Chest:      Chest wall: No tenderness.   Abdominal:      General: Bowel sounds are normal. There is no distension.      Palpations: Abdomen is soft.      Tenderness: There is no abdominal tenderness. There is no guarding or rebound.   Musculoskeletal:         General: No tenderness or deformity.      Cervical back: Neck supple.   Skin:     General: Skin is warm and dry.      Capillary Refill: Capillary refill takes 2 to 3 seconds.      Findings: No rash.   Neurological:      Cranial Nerves: No cranial nerve deficit.      Motor: Weakness present. No abnormal muscle tone.      Coordination: Coordination abnormal.      Gait: Gait abnormal.      Deep Tendon Reflexes: Reflexes normal.   Results Review:  Lab Results (last 24 hours)     Procedure  Component Value Units Date/Time    Blood Culture - Blood, Arm, Left [[id number]] Collected: 04/16/22 0346    Specimen: Blood from Arm, Left Updated: 04/16/22 0406    CBC & Differential [[id number]]  (Abnormal) Collected: 04/16/22 0200    Specimen: Blood from Arm, Left Updated: 04/16/22 0223    Narrative:      The following orders were created for panel order CBC & Differential.  Procedure                               Abnormality         Status                     ---------                               -----------         ------                     CBC Auto Differential[[id number]]        Abnormal            Final result                 Please view results for these tests on the individual orders.    CBC Auto Differential [[id number]]  (Abnormal) Collected: 04/16/22 0200    Specimen: Blood from Arm, Left Updated: 04/16/22 0223     WBC 15.70 10*3/mm3      RBC 1.72 10*6/mm3      Hemoglobin 4.8 g/dL      Hematocrit 15.8 %      MCV 91.9 fL      MCH 27.9 pg      MCHC 30.4 g/dL      RDW 15.5 %      RDW-SD 51.4 fl      MPV 10.0 fL      Platelets 301 10*3/mm3      Neutrophil % 71.6 %      Lymphocyte % 16.8 %      Monocyte % 7.7 %      Eosinophil % 0.3 %      Basophil % 0.2 %      Immature Grans % 3.4 %      Neutrophils, Absolute 11.25 10*3/mm3      Lymphocytes, Absolute 2.63 10*3/mm3      Monocytes, Absolute 1.21 10*3/mm3      Eosinophils, Absolute 0.04 10*3/mm3      Basophils, Absolute 0.03 10*3/mm3      Immature Grans, Absolute 0.54 10*3/mm3      nRBC 0.4 /100 WBC     Comprehensive Metabolic Panel [[id number]]  (Abnormal) Collected: 04/16/22 0112    Specimen: Blood Updated: 04/16/22 0152     Glucose 195 mg/dL      BUN 17 mg/dL      Creatinine 2.25 mg/dL      Sodium 138 mmol/L      Potassium 3.6 mmol/L      Chloride 103 mmol/L      CO2 17.0 mmol/L      Calcium 7.7 mg/dL      Total Protein 4.3 g/dL      Albumin 2.00 g/dL      ALT (SGPT) 12 U/L      AST (SGOT) 23 U/L      Alkaline Phosphatase 70 U/L      Total Bilirubin 0.3  mg/dL      Globulin 2.3 gm/dL      A/G Ratio 0.9 g/dL      BUN/Creatinine Ratio 7.6     Anion Gap 18.0 mmol/L      eGFR 27.0 mL/min/1.73      Comment: National Kidney Foundation and American Society of Nephrology (ASN) Task Force recommended calculation based on the Chronic Kidney Disease Epidemiology Collaboration (CKD-EPI) equation refit without adjustment for race.       Narrative:      GFR Normal >60  Chronic Kidney Disease <60  Kidney Failure <15      POC Glucose Once [[id number]]  (Abnormal) Collected: 04/16/22 0139    Specimen: Blood Updated: 04/16/22 0151     Glucose 179 mg/dL      Comment: : [id number] Keyword Rockstar AbigailMeter ID: [id number]       Blood Culture - Blood, Arm, Left [[id number]] Collected: 04/16/22 0112    Specimen: Blood from Arm, Left Updated: 04/16/22 0133    Comprehensive Metabolic Panel [[id number]]  (Abnormal) Collected: 04/15/22 2233    Specimen: Blood Updated: 04/15/22 2343     Glucose 137 mg/dL      BUN 16 mg/dL      Creatinine 2.15 mg/dL      Sodium 137 mmol/L      Potassium 3.6 mmol/L      Comment: Slight hemolysis detected by analyzer. Results may be affected.        Chloride 102 mmol/L      CO2 21.0 mmol/L      Calcium 7.9 mg/dL      Total Protein 4.6 g/dL      Albumin 2.00 g/dL      ALT (SGPT) 11 U/L      AST (SGOT) 22 U/L      Comment: Slight hemolysis detected by analyzer. Results may be affected.        Alkaline Phosphatase 71 U/L      Total Bilirubin 0.3 mg/dL      Globulin 2.6 gm/dL      A/G Ratio 0.8 g/dL      BUN/Creatinine Ratio 7.4     Anion Gap 14.0 mmol/L      eGFR 28.5 mL/min/1.73      Comment: National Kidney Foundation and American Society of Nephrology (ASN) Task Force recommended calculation based on the Chronic Kidney Disease Epidemiology Collaboration (CKD-EPI) equation refit without adjustment for race.       Narrative:      GFR Normal >60  Chronic Kidney Disease <60  Kidney Failure <15      Blood Culture With AASHISH - Blood, Arm, Right [[id number]]  (Normal)  Collected: 04/14/22 1938    Specimen: Blood from Arm, Right Updated: 04/15/22 2015     Blood Culture No growth at 24 hours    Urine Culture - Urine, Urine, Catheter In/Out [[id number]]  (Abnormal) Collected: 04/14/22 1025    Specimen: Urine, Catheter In/Out Updated: 04/15/22 1311     Urine Culture >100,000 CFU/mL Gram Negative Bacilli    CBC & Differential [[id number]]  (Abnormal) Collected: 04/15/22 1150    Specimen: Blood Updated: 04/15/22 1207    Narrative:      The following orders were created for panel order CBC & Differential.  Procedure                               Abnormality         Status                     ---------                               -----------         ------                     CBC Auto Differential[[id number]]        Abnormal            Final result                 Please view results for these tests on the individual orders.    CBC Auto Differential [[id number]]  (Abnormal) Collected: 04/15/22 1150    Specimen: Blood Updated: 04/15/22 1207     WBC 15.69 10*3/mm3      RBC 2.56 10*6/mm3      Hemoglobin 7.2 g/dL      Hematocrit 21.4 %      MCV 83.6 fL      MCH 28.1 pg      MCHC 33.6 g/dL      RDW 15.2 %      RDW-SD 45.9 fl      MPV 10.0 fL      Platelets 347 10*3/mm3      Neutrophil % 71.2 %      Lymphocyte % 20.5 %      Monocyte % 6.1 %      Eosinophil % 0.1 %      Basophil % 0.3 %      Immature Grans % 1.8 %      Neutrophils, Absolute 11.17 10*3/mm3      Lymphocytes, Absolute 3.21 10*3/mm3      Monocytes, Absolute 0.96 10*3/mm3      Eosinophils, Absolute 0.01 10*3/mm3      Basophils, Absolute 0.05 10*3/mm3      Immature Grans, Absolute 0.29 10*3/mm3      nRBC 0.4 /100 WBC            Cultures:  Blood Culture   Date Value Ref Range Status   04/14/2022 No growth at 24 hours  Preliminary     Urine Culture   Date Value Ref Range Status   04/14/2022 >100,000 CFU/mL Gram Negative Bacilli (A)  Preliminary       Radiology Data:    Imaging Results (Last 24 Hours)     Procedure Component Value Units  Date/Time    CT Abdomen Pelvis With Contrast [[id number]] Collected: 04/15/22 2114     Updated: 04/15/22 2146    Addenda:        Filling defect in right lower lobe pulmonary arteries present on image 7  suspicious for pulmonary embolus     Attempts to call extension 2/3/2004 the liver masses were unsuccessful  This report was finalized on 04/15/2022 21:43 by Dr. Nadeem Marshall MD.  Signed: 04/15/22 2143 by Nadeem Marshall MD    Narrative:      EXAMINATION:   CT ABDOMEN PELVIS W CONTRAST-  4/15/2022 9:14 PM CDT     HISTORY: CT ABDOMEN AND PELVIS WITH CONTRAST 4/15/2022 8:50 PM CDT     HISTORY: Abdominal abscess     COMPARISON: April 7, 2022.      DLP: 1367 mGy cm     TECHNIQUE: Following the intravenous administration of contrast, helical  CT tomographic images of the abdomen and pelvis were acquired. Coronal  reformatted images were also provided for review.      FINDINGS:   In the right lung on axial image 7 of filling defect is present probably  a small pulmonary embolus..      LIVER: No focal liver lesion. The hepatic vasculature is patent.      BILIARY SYSTEM: The gallbladder is unremarkable. No intrahepatic or  extrahepatic ductal dilatation.      PANCREAS: No focal pancreatic lesion.      SPLEEN: Unremarkable.      KIDNEYS AND ADRENALS: Adrenal glands are visualized. Renal contours are  normal in size shape position. A left external percutaneous nephrostomy  internal nephroureterostomy tube is present. The distal tip is  satisfactorily positioned in the bladder.. The ureters are decompressed  and normal in appearance.     RETROPERITONEUM: No mass, lymphadenopathy or hemorrhage.      GI TRACT: Air and fluid is present in the colon. Diarrhea may be  present.. The appendix is visualized and unremarkable.     OTHER: There is no mesenteric mass, lymphadenopathy or fluid collection.  The abdominopelvic vasculature is patent. The osseous structures and  soft tissues demonstrate no worrisome lesions.     PELVIS: A  fat-containing left inguinal hernia is present. There is soft  tissue swelling possibly an abscess associated with the left abductor  katie muscle.. The bladder is collapsed around a Mojica catheter..       Impression:      1. Suspicious for abscess in the left abductor katie muscle.  2. Left external/internal percutaneous nephrostomy ureterostomy catheter  is satisfactorily position.  3. Fluid and air is present in the colon. This may be associated with  diarrhea.  4. Mojica catheter is present..         This report was finalized on 04/15/2022 21:24 by Dr. Nadeem Marshall MD.          Allergies   Allergen Reactions   • Coconut Unknown - High Severity   • Nuts Unknown - High Severity   • Penicillins    • [place] Other (See Comments)     Causes migraines per pt reports       Scheduled meds:   ertapenem, 1 g, Intravenous, Q24H  famotidine, 20 mg, Intravenous, Q12H  FLUoxetine, 20 mg, Oral, Nightly  fluticasone, 2 spray, Each Nare, BID  folic acid, 1 mg, Oral, Daily  metoprolol succinate XL, 50 mg, Oral, Q24H  neomycin-polymyxin-hydrocortisone, 4 drop, Both Ears, Q6H  oxymetazoline, 2 spray, Nasal, BID  sodium bicarbonate, 650 mg, Oral, BID  sodium chloride, 10 mL, Intravenous, Q12H  SUMAtriptan, 50 mg, Oral, Once  vancomycin, 750 mg, Intravenous, Q24H        PRN meds:  hydrALAZINE  •  labetalol  •  ondansetron **OR** ondansetron  •  Pharmacy to Dose enoxaparin (LOVENOX)  •  sodium chloride  •  [COMPLETED] Insert peripheral IV **AND** sodium chloride  •  [COMPLETED] Insert peripheral IV **AND** sodium chloride  •  sodium chloride    Assessment/Plan       Intractable nausea and vomiting    Sepsis secondary to UTI (HCC)    Lactic acidosis    Hypokalemia    Essential (primary) hypertension    UTI (urinary tract infection), bacterial    Malfunction of nephrostomy tube (HCC)    Severe malnutrition (CMS/HCC)    Hypophosphatemia      Plan:  HPI.  Patient was admitted on 4/3 by Dr. Ricks.  Had COVID-19 here last year and ended  up having an abdominal hematoma and had to go to [place].  Was there for a long time and then an LTAC and then a skilled nursing facility.  Comes back with multidrug-resistant UTI on Invanz.   She presented with complaints of intractable nausea and vomiting.  Her medical problems started last August when she developed COVID-19 pneumonia and had numerous complications thereafter.  She developed an abdominal hematoma and had to go on dialysis secondary to extrinsic compression of her urinary system. She was transferred from here to [facility] and required exploratory laparotomy.  She ended up having bilateral percutaneous nephrostomy tubes and also ended up with a left ureteral stent.  She states that her main urologist is Dr. Rina Rey.  She states that she stopped producing urine from her percutaneous nephrostomy tube recently and that there are plans for it to be removed.  She states that she had gotten in touch with [place] to see if someone here could do it so she would not have to travel.  When she left [place], she spent considerable time at a skilled nursing facility in [place], [place].  She states that she has only been home a few weeks.  She was admitted to [facility] in [place] on 3/9 for electrolyte abnormalities.     UTI.  Renal function stable yesterday with a creatinine 0.76.  Dr. Ricks began treating a urinary tract infection with ceftriaxone.  She had a multidrug-resistant Klebsiella in October 2021.  I transitioned her to [facility] on 4/3.  Lactate down to 1.7 from 2.8 after fluids.  Urine does appear infected with too numerous to count white blood cells, 4+ bacteria and large leukocyte esterase. The sample was also bloody.  Urine culture has grown 2 distinct pathogens.  There is a multidrug-resistant Proteus mirabilis strain and an ESBL Klebsiella strain.  Invanz should be treating both of these organisms concurrently.  Today is day 5 of [facility].  We will treat  7-10 days.  Lactic acidosis improved after fluids and antibiotics.  Procalcitonin elevated at 0.31.  Dr. Dyer is familiar with her previous problems as he saw her last year.  I consulted him to evaluate her.  He wants to treat her current infection and then have her follow back at [facility].  Recommendation from urology-  Urology saw her and wants her to go back to [facility] to have her percutaneous nephrostomy pulled.  Long discussion with Dr. Haro yesterday urology at [facility], covering for Dr. Rey.  Recommended for infectious disease consult and Stratton cath placement.  Discussed with transfer line this morning saying that Dr. Rey will call me today to reevaluated.  Urology  believes his refluxing causing recurrent infection, and recommended stratton cath placement. PCNU, stent place in [facility].  [facility] refused transfer 4/15/2022.  I gave a call  to [facility] today discussed a decline in mental status and hemoglobin drop and possible abscess in the left abductor katie muscles 4/16/22.  CT scan abdomen pelvic-Suspicious for abscess in the left abductor katie muscle, Left external/internal percutaneous nephrostomy ureterostomy catheter  is satisfactorily position, Fluid and air is present in the colon- associated with diarrhea.  Patient is on Invanz and vancomycin.    Abscess left abductor katie muscles-General surgery consult.    Anemia/questionable bleeding.  Status post 2 units of blood.  Stop Xarelto yesterday.     Hypokalemia .  P.o. potassium..  Magnesium-normal.     Sludge in gallbladder/dysfunctional gallbladder?.  General surgery consult.  GI consult. No surgery per general surgery.  CT scan abdomen pelvic-persistent mild but improved urothelial thickening involving the left renal pelvis and proximal left ureter- related to resolving UTI,  internal/external left-sided nephroureterostomy tube appears in satisfactory position, mild left-sided hydronephrosis which is decreased, Small amount gas is  noted in the bladder,  No significant bladder wall thickening is identified, Small left pleural effusion- Increased hazy airspace opacities in the lung bases favored to represent diffuse atelectasis,  Based on the volume of gas within the GI tract- mild ileus may be present,  No evidence of bowel obstruction, Small amount fluid within the upper vagina where previously there was a small amount of gas, Hepatic steatosis.  HIDA scan-20% biliary ejection fraction.  Ultrasound abdomen pelvic-Sludge-filled gallbladder with no significant gallbladder wall thickening, pericholecystic fluid or convincing evidence of acute cholecystitis, Mild dilation of the common hepatic duct with no visualized choledocholithiasis, Hepatic steatosis.     Metabolic acidosis.  Bicarb drip.  P.o. bicarb.     Nausea/vomiting.  Nausea vomiting resolved.  DC Reglan.  Protonix. Bicarb. DC Inapsine as needed.  DC Compazine as needed. DC Phenergan as needed.   Plan for NG tube placement.    Hyppotension/tachycardia.  Requiring Levophed.  Stop Toprol    Sinus congestion/sinus pain.  Flonase.  Corticosporin eardrop.  ENT consult.  [place].  Dr. Nichols saw her and has wanted to do an audiogram, but she has declined to go over to his office the last 2 days.   CTA chest-No evidence of pulmonary embolus, Small LEFT pleural effusion, Bandlike areas of reticulation throughout both lungs likely representing scarring/fibrosis related to prior Covid 19 infection.      History of pulmonary embolus.  Hold Xarelto possible surgery.    DC Lovenox and put patient back on Xarelto 4/11/2022, DC Xarelto/15/22 due to anemia and possible bleeding.     Anxiety/depression/insomnia . Decrease Prozac nightly due to somnolence.   DC Ambien as needed due to somnolence.  DC trazodone due to some.    Altered mental status.    DC Fioricet as needed due to somnolence.  Reconsult neurology.  Plan for another CT scan of the head discussed with neurology today.     Pain.  DC Robaxin as  needed due to somnolence. Morphine as needed.      Anemia.  Folate deficiency . p.o. folate.  No sign of acute bleed.  Hemoglobin worsening.     Allergic rhinitis.  Flonase.     Nutrition.  Start NG tube feeding.     Deconditioning.  PT and OT consult.  Last time patient walk was in August of last year after Covid. Patient is mostly bedbound at the nursing home.     Blood culture pending.   Covid-19-negative.  Flu screen A and B-negative.  Urine culture gram-negative bacilli.     Discharge Planning: 3-6 days.  Long discussion with Dr. Haro urology at [facility] yesterday, covering for Dr. Rey.  Recommended for infectious disease consult and Mojica cath placement.  Discussed with transplant this morning saying that Dr. Rey will call me today to be evaluated.    Electronically signed by Amado Villarrael MD, 04/16/22, 9:48 AM CDT.                    Electronically signed by Amado Villarreal MD at 04/16/22 1025          Consult Notes (last 24 hours)      Abiola Yan MD at 04/16/22 1018      Consult Orders    1. Inpatient Neurology Consult General [[id number]] ordered by Amado Villarreal MD at 04/16/22 0758                   Neurology Consult Note    Patient:  Namita Zabala   YOB: 1975  MRN:  [id number]  Date of Admission:  4/2/2022  8:46 PM  Date: 4/16/2022    Referring Provider: Amado Villarreal MD  Reason for Consultation: Neuro decline. Went from following commands to not. Eyes deviate up and out.      History of present illness:     This is a 46 y.o.  female with H/O migraine, neurocardiogenic syncope, angina at rest, mitral valve prolapse  a fairly complicated PMH described below and now evaluated for altered mental status and neurological decline    IN August 2021 patient had Covid and developed hypercoagulable state with pulmonary embolism for which anticoagulation was started and then developed an acute large left hematoma which caused obstruction of ureters leading to ARF  requiring dialysis.  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 Neuro signed off    Since then she has been somnolent, in soft restraints as she pulled out feeding tube 4 times in one day. She was found to have ESBL, Klebsiella and Proteus UTI on admission and now GN bacilli of> 100, 00   Currently on Invanz, Neomycin for ears   Was on Vancomycin and had 1 time dose.     However since neuro last saw her and in past 24 hours she's had some more metabolic issues with substantial hemoglobin drop, hypotension requiring levophed and is still on this  CBC 04/16/22  0200 04/15/22  1150 04/14/22  1116 04/13/22  1128 04/12/22  0622 04/11/22  0752   WBC 15.70* 15.69* 10.54 19.48* 8.15 8.88   HEMOGLOBIN 4.8* 7.2* 9.9* 11.1* 10.1* 9.7*   HEMATOCRIT 15.8* 21.4* 29.6* 32.7* 30.9* 28.5*     She was transfused.     CT of abdomen indicated concern for a left abductor major abscess.     Apparently sometime yesterday her eyes deviated over and she became unresponsive  Now she will open her eyes and moan but does not communicate    During the night nursing thought she had possible tremor and not definite. None today    She will open eyes and may look toward the sound  Initially eyes deviated up and out  when open but this was while unresponsive    Last time she followed commands at 4 AM today  Got central line at 6 AM and was following commands but remaining nonverbal  No fevers but had been on forced air  Off of forced air last temperature was 96.9  Review of Systems  A 14 point review of systems was unobtainable as she is unresponsive    Vital Signs   Temp:  [95.6 °F (35.3 °C)-99.5 °F (37.5 °C)] 97.8 °F (36.6 °C)  Heart Rate:  [] 100  Resp:  [13-23] 13  BP: ()/(37-77) 119/66    General Exam:  Head:  Normal cephalic, atraumatic  HEENT:  Neck supple  Fundoscopic Exam:  No signs of disc edema  CVS:  Regular rate and rhythm.  No murmurs  Carotid Examination:  No bruits  Lungs:  Clear to auscultation  Abdomen:  Non-tender, Non-distended  Extremities:  No signs of peripheral edema  Skin:  No rashes    Neurologic Exam:    Mental Status:    -Opens eyes to name or loud noise but stares.  Somewhat looks toward sound but does not really visually connect. Does not follow any commands  Grimaces to significant noxious stimuli but mostly stares    CN II:  Visual fields--no response to visual threat Pupils equally reactive to light 8 mm to 5 mm  CN III, IV, VI:  Extraocular Muscles conjugate but does not follow  No dolls eyes  CN V:  Facial sensory--unable to assess  CN VII:  Facial motor symmetric  Grimace symmetric  CN VIII:  Gross hearing --she hears sound based on her eyes opening   CN IX/X:  Breathes on own and can cough  CN XI:  Shoulder shrug --unable to assess  CN XII:  Tongue protrusion--unable to assess    Motor: (strength out of 5:  1= minimal movement, 2 = movement in plane of gravity, 3 = movement against gravity, 4 = movement against some resistance, 5 = full strength)    No clear spontaneous movement  Withdraws all 4 extremities to noxious stimuli  Has increased tone left upper extremity    DTR:  -Right   Bicep: 3+ Triceps: 3+ Brachioradialis: 3+   Patella: 2+ Ankle: 2+ Neg Babinski  -Left   Bicep:  3+ Triceps: 3+ Brachioradialis: 3+   Patella: 2+ Ankle: 2+ Neg Babinski    Sensory:  -Unable to assess to light touch, pinprick, temperature, pain, and proprioception but does feel deep nail bed pressure    Coordination:  -Finger to nose/Heel to shin --no spontaneous movement  -No ataxia    Gait  -Unable to walk    Impression    · Metabolic encephalopathy superimposed on possible Covid encephalopathy verses other especially since this seems to be progressing  · Anemia with hemoglobin of 4.8 and hypotension currently a contributing factor  · Hypokalemic of 3.1  · Hypomagnesemia of 1.5  · Renal failure  · Multiple infections--UTIs  thigh abscess etc.    Plan    Head CT without  EEG --unable to obtain now due to tech no longer on call but will be tomorrow  Consider LP depending on results      ADDENDUM: No changes on Head CT in comparison to one done 4/11/22  Will obtain MRI    I discussed the patients findings and my recommendations with nursing staff and DR Villarreal     45 minutes of critical care time was performed ,during this time I consulted with the nursing staff and also with other providers in regard to the patient's care. I reviewed records as she is unable to provide a history, examined the patient reviewed labs and personally reviewed neuroimages of head CT  For a 46 year old she appears to have atrophy       Abiola Titus MD  04/16/22  10:18 CDT      Electronically signed by Abiola Yan MD at 04/16/22 1711     Tiffanie Scott MD at 04/16/22 1005          Tiffanie Scott MD Consult Note      Patient Care Team:  David Lara MD as PCP - General  David Lara MD as PCP - Family Medicine    Chief complaint possible left abductor major abscess    Subjective .     History of present illness: Very complex situation.  Case discussed with Dr. Villarreal.  Patient had necrotic bladder with rupture is had procedures done at [facility] for this.  She currently has a percutaneous  nephrostomy tube and stent in place.  She had pulmonary emboli over the summer from COVID and was on anticoagulation treatment.  She developed a significant hematoma which causing renal failure and dialysis which led to the necrosis of the bladder.  She was admitted to the medicine service for failure to thrive.  She is now in the ICU with concern for acute neurologic event overnight.  A CT scan of the abdomen was performed which among other things had concern for left abductor major abscess.  I been asked to evaluate.  She is unable to provide any meaningful history.  Review of Systems  Pertinent items are noted in HPI, all other systems reviewed and negative    History  Past Medical History:   Diagnosis Date   • Angina at rest (HCC)    • Migraine     Sees Pain Management for injections.    • MVP (mitral valve prolapse)    • Renal disorder    • Syncope    ,   Past Surgical History:   Procedure Laterality Date   • BREAST BIOPSY Right 2011    benign   • INSERTION HEMODIALYSIS CATHETER Left 9/16/2021    Procedure: HEMODIALYSIS CATHETER INSERTION;  Surgeon: Anders Kaur MD;  Location: [place] [address];  Service: Vascular;  Laterality: Left;   • TONSILLECTOMY     ,   Family History   Problem Relation Age of Onset   • Colon cancer Maternal Aunt 52   • Fibromyalgia Mother    • Heart disease Mother    • Hypertension Mother    • Hodgkin's lymphoma Paternal Grandmother    • Ovarian cancer Neg Hx    • Breast cancer Neg Hx    ,   Social History     Tobacco Use   • Smoking status: Never Smoker   • Smokeless tobacco: Never Used   Substance Use Topics   • Alcohol use: No   • Drug use: No   ,   Medications Prior to Admission   Medication Sig Dispense Refill Last Dose   • butalbital-acetaminophen-caffeine (FIORICET, ESGIC) -40 MG per tablet Take 2 tablets by mouth Every 4 (Four) Hours As Needed for Headache or Migraine.      • eszopiclone (LUNESTA) 3 MG tablet Take 3 mg by mouth Every Night. Take immediately  before bedtime      • FLUoxetine (PROzac) 40 MG capsule Take 40 mg by mouth Daily.      • methocarbamol (ROBAXIN) 500 MG tablet Take 500 mg by mouth 3 (Three) Times a Day As Needed for Muscle Spasms.      • metoclopramide (REGLAN) 5 MG/5ML solution Take 5 mg by mouth 3 (Three) Times a Day Before Meals.      • ondansetron ODT (ZOFRAN-ODT) 4 MG disintegrating tablet Place 4 mg on the tongue 4 (Four) Times a Day As Needed for Nausea or Vomiting.      • oxymetazoline (AFRIN) 0.05 % nasal spray 2 sprays into the nostril(s) as directed by provider 2 (Two) Times a Day.      • prochlorperazine (COMPAZINE) 10 MG tablet Take 10 mg by mouth Every 8 (Eight) Hours As Needed for Nausea or Vomiting.      • promethazine (PHENERGAN) 25 MG tablet Take 25-50 mg by mouth Every 6 (Six) Hours As Needed for Nausea or Vomiting.      • rivaroxaban (XARELTO) 20 MG tablet Take 20 mg by mouth Every Night.      • rizatriptan (MAXALT) 10 MG tablet Take 10 mg by mouth 1 (One) Time As Needed for Migraine. May repeat in 2 hours if needed      • traZODone (DESYREL) 100 MG tablet Take 100 mg by mouth Every Night.      • Calcium Carbonate-Simethicone 750-80 MG chewable tablet Chew 1 tablet Daily.      • pantoprazole (PROTONIX) 20 MG EC tablet Take 20 mg by mouth Daily.      , Scheduled Meds:  ertapenem, 1 g, Intravenous, Q24H  famotidine, 20 mg, Intravenous, Q12H  FLUoxetine, 20 mg, Oral, Nightly  fluticasone, 2 spray, Each Nare, BID  folic acid, 1 mg, Oral, Daily  neomycin-polymyxin-hydrocortisone, 4 drop, Both Ears, Q6H  oxymetazoline, 2 spray, Nasal, BID  sodium bicarbonate, 650 mg, Oral, BID  sodium chloride, 10 mL, Intravenous, Q12H  SUMAtriptan, 50 mg, Oral, Once  vancomycin, 750 mg, Intravenous, Q24H    , Continuous Infusions:  norepinephrine, 0.02-0.3 mcg/kg/min, Last Rate: 0.04 mcg/kg/min (04/16/22 0645)  Pharmacy to Dose enoxaparin (LOVENOX),   sodium bicarbonate, 100 mEq    , PRN Meds:  hydrALAZINE  •  labetalol  •  ondansetron **OR**  ondansetron  •  Pharmacy to Dose enoxaparin (LOVENOX)  •  sodium chloride  •  [COMPLETED] Insert peripheral IV **AND** sodium chloride  •  [COMPLETED] Insert peripheral IV **AND** sodium chloride  •  sodium chloride and Allergies:  Coconut, Nuts, Penicillins, and [place]    Objective     Vital Signs   Temp:  [95.6 °F (35.3 °C)-99.5 °F (37.5 °C)] 97.8 °F (36.6 °C)  Heart Rate:  [] 100  Resp:  [13-23] 13  BP: ()/(37-77) 119/66    Intake & Output (last 3 days)       04/13 0701  04/14 0700 04/14 0701  04/15 0700 04/15 0701  04/16 0700 04/16 0701  04/17 0700    P.O. 0 0 0     I.V. (mL/kg) 300 (4.4) 1432 (21.1) 900 (13.8)     Blood    300    NG/GT  30      IV Piggyback   750     Total Intake(mL/kg) 300 (4.4) 1462 (21.5) 1650 (25.2) 300 (4.6)    Urine (mL/kg/hr) 1700 (1) 300 (0.2) 300 (0.2) 100 (0.5)    Emesis/NG output   0     Stool   0     Total Output 1700 300 300 100    Net -1400 +1162 +1350 +200            Urine Unmeasured Occurrence 1 x       Stool Unmeasured Occurrence   5 x     Emesis Unmeasured Occurrence   2 x            Physical Exam:     General Appearance:   Awake, unresponsive   Head:    Normocephalic, without obvious abnormality, atraumatic   Eyes:            Lids and lashes normal, conjunctivae and sclerae normal, no   icterus, no pallor, corneas clear, PERRLA   Ears:    Ears appear intact with no abnormalities noted   Neck:   No adenopathy, supple, trachea midline   Back:     No kyphosis present, no scoliosis present, no skin lesions,      erythema or scars, no tenderness to percussion or                   palpation,  range of motion normal   Lungs:     Clear to auscultation,respirations regular, even and                  unlabored    Heart:    Regular rhythm and normal rate, normal S1 and S2, no            murmur, no gallop, no rub, no click   Chest Wall:    No abnormalities observed   Abdomen:    Soft large scar in the abdomen with open area in the middle aspect of it.   Rectal:     Deferred    Extremities:  There is some fullness in the upper left thigh of uncertain etiology.  There is no erythema or warmth to the tissue in that region.  There is been no instrumentation recently in that area.  She does have a right femoral central line   Pulses:   Pulses palpable and equal bilaterally   Skin:   No bleeding, bruising or rash   Lymph nodes:   No palpable adenopathy   Neurologic:   No focal deficits       Lab Results (last 72 hours)     Procedure Component Value Units Date/Time    Blood Culture - Blood, Arm, Left [[id number]] Collected: 04/16/22 0346    Specimen: Blood from Arm, Left Updated: 04/16/22 0406    CBC & Differential [[id number]]  (Abnormal) Collected: 04/16/22 0200    Specimen: Blood from Arm, Left Updated: 04/16/22 0223    Narrative:      The following orders were created for panel order CBC & Differential.  Procedure                               Abnormality         Status                     ---------                               -----------         ------                     CBC Auto Differential[[id number]]        Abnormal            Final result                 Please view results for these tests on the individual orders.    CBC Auto Differential [[id number]]  (Abnormal) Collected: 04/16/22 0200    Specimen: Blood from Arm, Left Updated: 04/16/22 0223     WBC 15.70 10*3/mm3      RBC 1.72 10*6/mm3      Hemoglobin 4.8 g/dL      Hematocrit 15.8 %      MCV 91.9 fL      MCH 27.9 pg      MCHC 30.4 g/dL      RDW 15.5 %      RDW-SD 51.4 fl      MPV 10.0 fL      Platelets 301 10*3/mm3      Neutrophil % 71.6 %      Lymphocyte % 16.8 %      Monocyte % 7.7 %      Eosinophil % 0.3 %      Basophil % 0.2 %      Immature Grans % 3.4 %      Neutrophils, Absolute 11.25 10*3/mm3      Lymphocytes, Absolute 2.63 10*3/mm3      Monocytes, Absolute 1.21 10*3/mm3      Eosinophils, Absolute 0.04 10*3/mm3      Basophils, Absolute 0.03 10*3/mm3      Immature Grans, Absolute 0.54 10*3/mm3      nRBC 0.4 /100 WBC      Comprehensive Metabolic Panel [[id number]]  (Abnormal) Collected: 04/16/22 0112    Specimen: Blood Updated: 04/16/22 0152     Glucose 195 mg/dL      BUN 17 mg/dL      Creatinine 2.25 mg/dL      Sodium 138 mmol/L      Potassium 3.6 mmol/L      Chloride 103 mmol/L      CO2 17.0 mmol/L      Calcium 7.7 mg/dL      Total Protein 4.3 g/dL      Albumin 2.00 g/dL      ALT (SGPT) 12 U/L      AST (SGOT) 23 U/L      Alkaline Phosphatase 70 U/L      Total Bilirubin 0.3 mg/dL      Globulin 2.3 gm/dL      A/G Ratio 0.9 g/dL      BUN/Creatinine Ratio 7.6     Anion Gap 18.0 mmol/L      eGFR 27.0 mL/min/1.73      Comment: National Kidney Foundation and American Society of Nephrology (ASN) Task Force recommended calculation based on the Chronic Kidney Disease Epidemiology Collaboration (CKD-EPI) equation refit without adjustment for race.       Narrative:      GFR Normal >60  Chronic Kidney Disease <60  Kidney Failure <15      POC Glucose Once [[id number]]  (Abnormal) Collected: 04/16/22 0139    Specimen: Blood Updated: 04/16/22 0151     Glucose 179 mg/dL      Comment: : [id number] [employer] Abigail[employer]eter ID: [id number]       Blood Culture - Blood, Arm, Left [[id number]] Collected: 04/16/22 0112    Specimen: Blood from Arm, Left Updated: 04/16/22 0133    Comprehensive Metabolic Panel [[id number]]  (Abnormal) Collected: 04/15/22 2233    Specimen: Blood Updated: 04/15/22 2343     Glucose 137 mg/dL      BUN 16 mg/dL      Creatinine 2.15 mg/dL      Sodium 137 mmol/L      Potassium 3.6 mmol/L      Comment: Slight hemolysis detected by analyzer. Results may be affected.        Chloride 102 mmol/L      CO2 21.0 mmol/L      Calcium 7.9 mg/dL      Total Protein 4.6 g/dL      Albumin 2.00 g/dL      ALT (SGPT) 11 U/L      AST (SGOT) 22 U/L      Comment: Slight hemolysis detected by analyzer. Results may be affected.        Alkaline Phosphatase 71 U/L      Total Bilirubin 0.3 mg/dL      Globulin 2.6 gm/dL      A/G Ratio 0.8 g/dL       BUN/Creatinine Ratio 7.4     Anion Gap 14.0 mmol/L      eGFR 28.5 mL/min/1.73      Comment: National Kidney Foundation and American Society of Nephrology (ASN) Task Force recommended calculation based on the Chronic Kidney Disease Epidemiology Collaboration (CKD-EPI) equation refit without adjustment for race.       Narrative:      GFR Normal >60  Chronic Kidney Disease <60  Kidney Failure <15      Blood Culture With AASHISH - Blood, Arm, Right [[id number]]  (Normal) Collected: 04/14/22 1938    Specimen: Blood from Arm, Right Updated: 04/15/22 2015     Blood Culture No growth at 24 hours    Urine Culture - Urine, Urine, Catheter In/Out [[id number]]  (Abnormal) Collected: 04/14/22 1025    Specimen: Urine, Catheter In/Out Updated: 04/15/22 1311     Urine Culture >100,000 CFU/mL Gram Negative Bacilli    CBC & Differential [[id number]]  (Abnormal) Collected: 04/15/22 1150    Specimen: Blood Updated: 04/15/22 1207    Narrative:      The following orders were created for panel order CBC & Differential.  Procedure                               Abnormality         Status                     ---------                               -----------         ------                     CBC Auto Differential[[id number]]        Abnormal            Final result                 Please view results for these tests on the individual orders.    CBC Auto Differential [[id number]]  (Abnormal) Collected: 04/15/22 1150    Specimen: Blood Updated: 04/15/22 1207     WBC 15.69 10*3/mm3      RBC 2.56 10*6/mm3      Hemoglobin 7.2 g/dL      Hematocrit 21.4 %      MCV 83.6 fL      MCH 28.1 pg      MCHC 33.6 g/dL      RDW 15.2 %      RDW-SD 45.9 fl      MPV 10.0 fL      Platelets 347 10*3/mm3      Neutrophil % 71.2 %      Lymphocyte % 20.5 %      Monocyte % 6.1 %      Eosinophil % 0.1 %      Basophil % 0.3 %      Immature Grans % 1.8 %      Neutrophils, Absolute 11.17 10*3/mm3      Lymphocytes, Absolute 3.21 10*3/mm3      Monocytes, Absolute 0.96 10*3/mm3       Eosinophils, Absolute 0.01 10*3/mm3      Basophils, Absolute 0.05 10*3/mm3      Immature Grans, Absolute 0.29 10*3/mm3      nRBC 0.4 /100 WBC     Comprehensive Metabolic Panel [[id number]]  (Abnormal) Collected: 04/14/22 1116    Specimen: Blood Updated: 04/14/22 1232     Glucose 110 mg/dL      BUN 7 mg/dL      Creatinine 0.72 mg/dL      Sodium 137 mmol/L      Potassium 3.4 mmol/L      Comment: Slight hemolysis detected by analyzer. Results may be affected.        Chloride 99 mmol/L      CO2 25.0 mmol/L      Calcium 8.8 mg/dL      Total Protein 6.0 g/dL      Albumin 2.70 g/dL      ALT (SGPT) 13 U/L      AST (SGOT) 21 U/L      Comment: Slight hemolysis detected by analyzer. Results may be affected.        Alkaline Phosphatase 111 U/L      Total Bilirubin 0.5 mg/dL      Globulin 3.3 gm/dL      A/G Ratio 0.8 g/dL      BUN/Creatinine Ratio 9.7     Anion Gap 13.0 mmol/L      eGFR 105.9 mL/min/1.73      Comment: National Kidney Foundation and American Society of Nephrology (ASN) Task Force recommended calculation based on the Chronic Kidney Disease Epidemiology Collaboration (CKD-EPI) equation refit without adjustment for race.       Narrative:      GFR Normal >60  Chronic Kidney Disease <60  Kidney Failure <15      Protime-INR [[id number]]  (Abnormal) Collected: 04/14/22 1116    Specimen: Blood Updated: 04/14/22 1219     Protime 17.0 Seconds      INR 1.44    CBC & Differential [[id number]]  (Abnormal) Collected: 04/14/22 1116    Specimen: Blood Updated: 04/14/22 1213    Narrative:      The following orders were created for panel order CBC & Differential.  Procedure                               Abnormality         Status                     ---------                               -----------         ------                     CBC Auto Differential[[id number]]        Abnormal            Final result                 Please view results for these tests on the individual orders.    CBC Auto Differential [[id number]]  (Abnormal)  Collected: 04/14/22 1116    Specimen: Blood Updated: 04/14/22 1213     WBC 10.54 10*3/mm3      RBC 3.54 10*6/mm3      Hemoglobin 9.9 g/dL      Hematocrit 29.6 %      MCV 83.6 fL      MCH 28.0 pg      MCHC 33.4 g/dL      RDW 14.9 %      RDW-SD 45.7 fl      MPV 9.8 fL      Platelets 354 10*3/mm3      Neutrophil % 77.6 %      Lymphocyte % 14.7 %      Monocyte % 4.5 %      Eosinophil % 1.7 %      Basophil % 0.6 %      Immature Grans % 0.9 %      Neutrophils, Absolute 8.19 10*3/mm3      Lymphocytes, Absolute 1.55 10*3/mm3      Monocytes, Absolute 0.47 10*3/mm3      Eosinophils, Absolute 0.18 10*3/mm3      Basophils, Absolute 0.06 10*3/mm3      Immature Grans, Absolute 0.09 10*3/mm3      nRBC 0.2 /100 WBC     Urinalysis, Microscopic Only - Urine, Catheter In/Out [[id number]]  (Abnormal) Collected: 04/14/22 1025    Specimen: Urine, Catheter In/Out Updated: 04/14/22 1050     RBC, UA 0-2 /HPF      WBC, UA Too Numerous to Count /HPF      Bacteria, UA 4+ /HPF      Squamous Epithelial Cells, UA None Seen /HPF      Hyaline Casts, UA None Seen /LPF      Methodology Manual Light Microscopy    Urinalysis With Culture If Indicated - Urine, Catheter In/Out [[id number]]  (Abnormal) Collected: 04/14/22 1025    Specimen: Urine, Catheter In/Out Updated: 04/14/22 1040     Color, UA Yellow     Appearance, UA Cloudy     pH, UA >=9.0     Specific Gravity, UA 1.007     Glucose, UA Negative     Ketones, UA Trace     Bilirubin, UA Negative     Blood, UA Large (3+)     Protein, UA Trace     Leuk Esterase, UA Large (3+)     Nitrite, UA Negative     Urobilinogen, UA 0.2 E.U./dL    CBC (No Diff) [[id number]]  (Abnormal) Collected: 04/13/22 1128    Specimen: Blood Updated: 04/13/22 1140     WBC 19.48 10*3/mm3      RBC 3.97 10*6/mm3      Hemoglobin 11.1 g/dL      Hematocrit 32.7 %      MCV 82.4 fL      MCH 28.0 pg      MCHC 33.9 g/dL      RDW 15.1 %      RDW-SD 45.1 fl      MPV 9.8 fL      Platelets 373 10*3/mm3         Imaging Results (Last 72 Hours)      Procedure Component Value Units Date/Time    CT Abdomen Pelvis With Contrast [[id number]] Collected: 04/15/22 2114     Updated: 04/15/22 2146    Addenda:        Filling defect in right lower lobe pulmonary arteries present on image 7  suspicious for pulmonary embolus     Attempts to call extension 2/3/2004 the liver masses were unsuccessful  This report was finalized on 04/15/2022 21:43 by Dr. Nadeem Marshall MD.  Signed: 04/15/22 2143 by Nadeem Marshall MD    Narrative:      EXAMINATION:   CT ABDOMEN PELVIS W CONTRAST-  4/15/2022 9:14 PM CDT     HISTORY: CT ABDOMEN AND PELVIS WITH CONTRAST 4/15/2022 8:50 PM CDT     HISTORY: Abdominal abscess     COMPARISON: April 7, 2022.      DLP: 1367 mGy cm     TECHNIQUE: Following the intravenous administration of contrast, helical  CT tomographic images of the abdomen and pelvis were acquired. Coronal  reformatted images were also provided for review.      FINDINGS:   In the right lung on axial image 7 of filling defect is present probably  a small pulmonary embolus..      LIVER: No focal liver lesion. The hepatic vasculature is patent.      BILIARY SYSTEM: The gallbladder is unremarkable. No intrahepatic or  extrahepatic ductal dilatation.      PANCREAS: No focal pancreatic lesion.      SPLEEN: Unremarkable.      KIDNEYS AND ADRENALS: Adrenal glands are visualized. Renal contours are  normal in size shape position. A left external percutaneous nephrostomy  internal nephroureterostomy tube is present. The distal tip is  satisfactorily positioned in the bladder.. The ureters are decompressed  and normal in appearance.     RETROPERITONEUM: No mass, lymphadenopathy or hemorrhage.      GI TRACT: Air and fluid is present in the colon. Diarrhea may be  present.. The appendix is visualized and unremarkable.     OTHER: There is no mesenteric mass, lymphadenopathy or fluid collection.  The abdominopelvic vasculature is patent. The osseous structures and  soft tissues demonstrate no  worrisome lesions.     PELVIS: A fat-containing left inguinal hernia is present. There is soft  tissue swelling possibly an abscess associated with the left abductor  katie muscle.. The bladder is collapsed around a Mojica catheter..       Impression:      1. Suspicious for abscess in the left abductor katie muscle.  2. Left external/internal percutaneous nephrostomy ureterostomy catheter  is satisfactorily position.  3. Fluid and air is present in the colon. This may be associated with  diarrhea.  4. Mojica catheter is present..         This report was finalized on 04/15/2022 21:24 by Dr. Nadeem Marshall MD.    XR Abdomen KUB [[id number]] Collected: 04/14/22 1804     Updated: 04/14/22 1811    Narrative:      XR ABDOMEN KUB- 4/14/2022 5:56 PM CDT     HISTORY: ng placement; E87.6-Hypokalemia; N39.0-Urinary tract infection,  site not specified; R31.9-Hematuria, unspecified; R11.2-Nausea with  vomiting, unspecified; E87.2-Acidosis       COMPARISON: April 14, 2022 at 4:33 PM     FINDINGS:  There is a nonspecific bowel gas pattern. Dobbhoff tube is present  coiled in the fundus the stomach satisfactorily position. A left-sided  nephrostomy catheter is present..     No acute skeletal abnormality is identified.        Impression:      1. Dobbhoff feeding tube satisfactorily position in the fundus of  stomach..         This report was finalized on 04/14/2022 18:08 by Dr. Nadeem Marshall MD.    XR Abdomen KUB [[id number]] Collected: 04/14/22 1650     Updated: 04/14/22 1654    Narrative:      SINGLE VIEW ABDOMEN        4/14/2022      HISTORY: ng placement; E87.6-Hypokalemia; N39.0-Urinary tract infection,  site not specified; R31.9-Hematuria, unspecified; R11.2-Nausea with  vomiting, unspecified; E87.2-Acidosis     FINDINGS: Weighted feeding tube with tip curled back into the gastric  fundus.     There is a left-sided internal/external nephrostomy tube. The lung bases  are clear. Moderate gastric distention. Large volume colonic  stool with  gaseous distention of colon. No gross intraperitoneal free air.       Impression:      Feeding tube tip located in the gastric fundus.     This report was finalized on 04/14/2022 16:51 by Dr Rob Ribeiro, .    XR Abdomen KUB [[id number]] Collected: 04/14/22 1503     Updated: 04/14/22 1507    Narrative:      EXAMINATION: XR ABDOMEN KUB-     4/14/2022 2:39 PM CDT     HISTORY: NG placement; E87.6-Hypokalemia; N39.0-Urinary tract infection,  site not specified; R31.9-Hematuria, unspecified; R11.2-Nausea with  vomiting, unspecified; E87.2-Acidosis     Portable radiograph of the upper abdomen and lower chest.     A Dobbhoff feeding tube is now present and in good position with tip  extending to the distal stomach.     Diffuse dilation of bowel loops is again seen.     Left percutaneous nephrostomy/ureteral tube is again seen.     Summary:  1. Well-positioned Dobbhoff feeding tube.  This report was finalized on 04/14/2022 15:03 by Dr. Raúl Pan MD.    XR Abdomen KUB [[id number]] Collected: 04/14/22 1425     Updated: 04/14/22 1433    Narrative:      SINGLE VIEW ABDOMEN 4/14/2022      HISTORY: Dobhoff placement; E87.6-Hypokalemia; N39.0-Urinary tract  infection, site not specified; R31.9-Hematuria, unspecified;  R11.2-Nausea with vomiting, unspecified; E87.2-Acidosis     FINDINGS: Dobbhoff tube is nonvisualized. There is a left-sided  internal/external nephrostomy tube. The lung bases are clear. Moderate  gastric distention. Large volume colonic stool with gaseous distention  of the colon as well, colonic distention measuring up to 7 cm diameter.  No gross intraperitoneal free air.       Impression:      Dobbhoff tube is nonvisualized and is either too shallow or  perhaps curled in the throat or upper esophagus. I would recommend  replacing and repeat abdominal film for confirmation.     Findings and recommendations were discussed with pt's nurse (3A) on  4/14/2022 at 2:29 PM CDT.  This report was finalized on  "04/14/2022 14:30 by Dr Rob Ribeiro, .                Assessment/Plan       Intractable nausea and vomiting    Sepsis secondary to UTI (HCC)    Lactic acidosis    Hypokalemia    Essential (primary) hypertension    UTI (urinary tract infection), bacterial    Malfunction of nephrostomy tube (HCC)    Severe malnutrition (CMS/HCC)    Hypophosphatemia      Is a very complex situation.  Evaluating the CT scan, this should be a very difficult area for me to gain access to.  This may be hematoma as she has acute anemia probable blood loss.  I explained to Dr. Villarreal that best case scenario is that this patient is transferred back to the tertiary care center for treatment of her complex situation.  The going to get neurology to evaluate as well.      Tiffanie Scott MD  04/16/22  10:05 CDT              Electronically signed by Tiffanie Scott MD at 04/16/22 1011     [facility] at 04/16/22 0206          Pharmacy Dosing Service  Pharmacokinetics  Vancomycin Initial Evaluation  Assessment/Action/Plan:  Loading dose?: 1250mg  Current Order: Vancomycin 750 mg IVPB every 24 hours  Current end date:4/20  Levels: not ordered  Additional antimicrobial agent(s): Invanz    Vancomycin dosage initiated based on population pharmacokinetic parameters. Pharmacy will continue to follow daily and adjust dose accordingly.     Subjective:  Namita Zabala is a 46 y.o. female with a Vancomycin \"Pharmacy to Dose\" consult for the treatment of SSTI , day 1 of 5 of treatment.    AUC Model Data:  Loading dose: 1250mg  Regimen: 750 mg IV every 24 hours.  Start time: 03:05 on 04/16/2022  Exposure target: AUC24 (range)400-600 mg/L.hr   AUC24,ss: 446 mg/L.hr  PAUC*: 62 %  Ctrough,ss: 14.5 mg/L  Pconc*: 21 %  Tox.: 10 %      Objective:  Ht: 170.2 cm (67\"); Wt: 67.5 kg (148 lb 14.4 oz)  Estimated Creatinine Clearance: 34 mL/min (A) (by C-G formula based on SCr of 2.25 mg/dL (H)).   Creatinine   Date Value Ref Range Status "   04/16/2022 2.25 (H) 0.57 - 1.00 mg/dL Final   04/15/2022 2.15 (H) 0.57 - 1.00 mg/dL Final   04/14/2022 0.72 0.57 - 1.00 mg/dL Final   01/25/2022 0.69 0.57 - 1.11 mg/dL Final   01/24/2022 0.67 0.57 - 1.11 mg/dL Final   01/23/2022 0.69 0.57 - 1.11 mg/dL Final   04/25/2019 1 (H) 0.5 - 0.9 mg/dL Final      Lab Results   Component Value Date    WBC 15.69 (H) 04/15/2022    WBC 10.54 04/14/2022    WBC 19.48 (H) 04/13/2022      Baseline culture results:  Microbiology Results (last 10 days)       Procedure Component Value - Date/Time    Blood Culture With AASHISH - Blood, Arm, Right [[id number]]  (Normal) Collected: 04/14/22 1938    Lab Status: Preliminary result Specimen: Blood from Arm, Right Updated: 04/15/22 2015     Blood Culture No growth at 24 hours    Urine Culture - Urine, Urine, Catheter In/Out [[id number]]  (Abnormal) Collected: 04/14/22 1025    Lab Status: Preliminary result Specimen: Urine, Catheter In/Out Updated: 04/15/22 1311     Urine Culture >100,000 CFU/mL Gram Negative Bacilli            Cielo Burt RPH  04/16/22 02:04 CDT      Electronically signed by Cielo Burt RP at 04/16/22 1931

## 2022-04-16 NOTE — CONSULTS
Neurology Consult Note    Patient:  Namita Zabala   YOB: 1975  MRN:  [id number]  Date of Admission:  4/2/2022  8:46 PM  Date: 4/16/2022    Referring Provider: Amado Villarreal MD  Reason for Consultation: Neuro decline. Went from following commands to not. Eyes deviate up and out.      History of present illness:     This is a 46 y.o.  female with H/O migraine, neurocardiogenic syncope, angina at rest, mitral valve prolapse  a fairly complicated PMH described below and now evaluated for altered mental status and neurological decline    IN August 2021 patient had Covid and developed hypercoagulable state with pulmonary embolism for which anticoagulation was started and then developed an acute large left hematoma which caused obstruction of ureters leading to ARF requiring dialysis.  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 Neuro signed off    Since then she has been somnolent, in soft restraints as she pulled out feeding tube 4 times in one day. She was found to have ESBL, Klebsiella and Proteus UTI on admission and now GN bacilli of> 100, 00   Currently on Invanz, Neomycin for ears   Was on Vancomycin and had 1 time dose.     However since neuro last saw  her and in past 24 hours she's had some more metabolic issues with substantial hemoglobin drop, hypotension requiring levophed and is still on this  CBC 04/16/22  0200 04/15/22  1150 04/14/22  1116 04/13/22  1128 04/12/22  0622 04/11/22  0752   WBC 15.70* 15.69* 10.54 19.48* 8.15 8.88   HEMOGLOBIN 4.8* 7.2* 9.9* 11.1* 10.1* 9.7*   HEMATOCRIT 15.8* 21.4* 29.6* 32.7* 30.9* 28.5*     She was transfused.     CT of abdomen indicated concern for a left abductor major abscess.     Apparently sometime yesterday her eyes deviated over and she became unresponsive  Now she will open her eyes and moan but does not communicate    During the night nursing thought she had possible tremor and not definite. None today    She will open eyes and may look toward the sound  Initially eyes deviated up and out when open but this was while unresponsive    Last time she followed commands at 4 AM today  Got central line at 6 AM and was following commands but remaining nonverbal  No fevers but had been on forced air  Off of forced air last temperature was 96.9    Past Medical History:   Diagnosis Date   • Angina at rest (HCC)    • Migraine     Sees Pain Management for injections.    • MVP (mitral valve prolapse)    • Renal disorder    • Syncope        Past Surgical History:   Procedure Laterality Date   • BREAST BIOPSY Right 2011    benign   • INSERTION HEMODIALYSIS CATHETER Left 9/16/2021    Procedure: HEMODIALYSIS CATHETER INSERTION;  Surgeon: Anders Kaur MD;  Location: [place] [id number];  Service: Vascular;  Laterality: Left;   • TONSILLECTOMY         Prior to Admission medications    Medication Sig Start Date End Date Taking? Authorizing Provider   butalbital-acetaminophen-caffeine (FIORICET, ESGIC) -40 MG per tablet Take 2 tablets by mouth Every 4 (Four) Hours As Needed for Headache or Migraine.   Yes Provider, MD Odalys   eszopiclone (LUNESTA) 3 MG tablet Take 3 mg by mouth Every Night. Take immediately before  bedtime   Yes Odalys Mullen MD   FLUoxetine (PROzac) 40 MG capsule Take 40 mg by mouth Daily.   Yes Odalys Mullen MD   methocarbamol (ROBAXIN) 500 MG tablet Take 500 mg by mouth 3 (Three) Times a Day As Needed for Muscle Spasms.   Yes Odalys Mullen MD   metoclopramide (REGLAN) 5 MG/5ML solution Take 5 mg by mouth 3 (Three) Times a Day Before Meals.   Yes Odalys Mullen MD   ondansetron ODT (ZOFRAN-ODT) 4 MG disintegrating tablet Place 4 mg on the tongue 4 (Four) Times a Day As Needed for Nausea or Vomiting.   Yes Odalys Mullen MD   oxymetazoline (AFRIN) 0.05 % nasal spray 2 sprays into the nostril(s) as directed by provider 2 (Two) Times a Day.   Yes Odalys Mullen MD   prochlorperazine (COMPAZINE) 10 MG tablet Take 10 mg by mouth Every 8 (Eight) Hours As Needed for Nausea or Vomiting.   Yes Odalys Mullen MD   promethazine (PHENERGAN) 25 MG tablet Take 25-50 mg by mouth Every 6 (Six) Hours As Needed for Nausea or Vomiting.   Yes Odalys Mullen MD   rivaroxaban (XARELTO) 20 MG tablet Take 20 mg by mouth Every Night.   Yes Odalys Mullen MD   rizatriptan (MAXALT) 10 MG tablet Take 10 mg by mouth 1 (One) Time As Needed for Migraine. May repeat in 2 hours if needed   Yes Odalys Mullen MD   traZODone (DESYREL) 100 MG tablet Take 100 mg by mouth Every Night.   Yes Odalys Mullen MD   Calcium Carbonate-Simethicone 750-80 MG chewable tablet Chew 1 tablet Daily.    Odalys Mullen MD   pantoprazole (PROTONIX) 20 MG EC tablet Take 20 mg by mouth Daily.    Odalys Mullen MD       Hospital scheduled medications:   ertapenem, 1 g, Intravenous, Q24H  famotidine, 20 mg, Intravenous, Q12H  FLUoxetine, 20 mg, Oral, Nightly  fluticasone, 2 spray, Each Nare, BID  folic acid, 1 mg, Oral, Daily  neomycin-polymyxin-hydrocortisone, 4 drop, Both Ears, Q6H  oxymetazoline, 2 spray, Nasal, BID  sodium bicarbonate, 650 mg, Oral, BID  sodium  chloride, 10 mL, Intravenous, Q12H  SUMAtriptan, 50 mg, Oral, Once  vancomycin, 750 mg, Intravenous, Q24H      Hospital PRN medications:  hydrALAZINE  •  labetalol  •  ondansetron **OR** ondansetron  •  Pharmacy to Dose enoxaparin (LOVENOX)  •  sodium chloride  •  [COMPLETED] Insert peripheral IV **AND** sodium chloride  •  [COMPLETED] Insert peripheral IV **AND** sodium chloride  •  sodium chloride    Allergies   Allergen Reactions   • Coconut Unknown - High Severity   • Nuts Unknown - High Severity   • Penicillins    • [place] Other (See Comments)     Causes migraines per pt reports       Social History     Socioeconomic History   • Marital status:    Tobacco Use   • Smoking status: Never Smoker   • Smokeless tobacco: Never Used   Substance and Sexual Activity   • Alcohol use: No   • Drug use: No     Family History   Problem Relation Age of Onset   • Colon cancer Maternal Aunt 52   • Fibromyalgia Mother    • Heart disease Mother    • Hypertension Mother    • Hodgkin's lymphoma Paternal Grandmother    • Ovarian cancer Neg Hx    • Breast cancer Neg Hx        Review of Systems  A 14 point review of systems was unobtainable as she is unresponsive    Vital Signs   Temp:  [95.6 °F (35.3 °C)-99.5 °F (37.5 °C)] 97.8 °F (36.6 °C)  Heart Rate:  [] 100  Resp:  [13-23] 13  BP: ()/(37-77) 119/66    General Exam:  Head:  Normal cephalic, atraumatic  HEENT:  Neck supple  Fundoscopic Exam:  No signs of disc edema  CVS:  Regular rate and rhythm.  No murmurs  Carotid Examination:  No bruits  Lungs:  Clear to auscultation  Abdomen:  Non-tender, Non-distended  Extremities:  No signs of peripheral edema  Skin:  No rashes    Neurologic Exam:    Mental Status:    -Opens eyes to name or loud noise but stares.  Somewhat looks toward sound but does not really visually connect. Does not follow any commands  Grimaces to significant noxious stimuli but mostly stares    CN II:  Visual fields--no response to visual threat  Pupils equally reactive to light 8 mm to 5 mm  CN III, IV, VI:  Extraocular Muscles conjugate but does not follow  No dolls eyes  CN V:  Facial sensory--unable to assess  CN VII:  Facial motor symmetric  Grimace symmetric  CN VIII:  Gross hearing --she hears sound based on her eyes opening   CN IX/X:  Breathes on own and can cough  CN XI:  Shoulder shrug --unable to assess  CN XII:  Tongue protrusion--unable to assess    Motor: (strength out of 5:  1= minimal movement, 2 = movement in plane of gravity, 3 = movement against gravity, 4 = movement against some resistance, 5 = full strength)    No clear spontaneous movement  Withdraws all 4 extremities to noxious stimuli  Has increased tone left upper extremity    DTR:  -Right   Bicep: 3+ Triceps: 3+ Brachioradialis: 3+   Patella: 2+ Ankle: 2+ Neg Babinski  -Left   Bicep: 3+ Triceps: 3+ Brachioradialis: 3+   Patella: 2+ Ankle: 2+ Neg Babinski    Sensory:  -Unable to assess to light touch, pinprick, temperature, pain, and proprioception but does feel deep nail bed pressure    Coordination:  -Finger to nose/Heel to shin --no spontaneous movement  -No ataxia    Gait  -Unable to walk      Results Review:  Lab Results (last 7 days)     Procedure Component Value Units Date/Time    Blood Culture - Blood, Arm, Left [[id number]] Collected: 04/16/22 0346    Specimen: Blood from Arm, Left Updated: 04/16/22 0406    CBC & Differential [[id number]]  (Abnormal) Collected: 04/16/22 0200    Specimen: Blood from Arm, Left Updated: 04/16/22 0223    Narrative:      The following orders were created for panel order CBC & Differential.  Procedure                               Abnormality         Status                     ---------                               -----------         ------                     CBC Auto Differential[[id number]]        Abnormal            Final result                 Please view results for these tests on the individual orders.    CBC Auto Differential [[id number]]   (Abnormal) Collected: 04/16/22 0200    Specimen: Blood from Arm, Left Updated: 04/16/22 0223     WBC 15.70 10*3/mm3      RBC 1.72 10*6/mm3      Hemoglobin 4.8 g/dL      Hematocrit 15.8 %      MCV 91.9 fL      MCH 27.9 pg      MCHC 30.4 g/dL      RDW 15.5 %      RDW-SD 51.4 fl      MPV 10.0 fL      Platelets 301 10*3/mm3      Neutrophil % 71.6 %      Lymphocyte % 16.8 %      Monocyte % 7.7 %      Eosinophil % 0.3 %      Basophil % 0.2 %      Immature Grans % 3.4 %      Neutrophils, Absolute 11.25 10*3/mm3      Lymphocytes, Absolute 2.63 10*3/mm3      Monocytes, Absolute 1.21 10*3/mm3      Eosinophils, Absolute 0.04 10*3/mm3      Basophils, Absolute 0.03 10*3/mm3      Immature Grans, Absolute 0.54 10*3/mm3      nRBC 0.4 /100 WBC     Comprehensive Metabolic Panel [[id number]]  (Abnormal) Collected: 04/16/22 0112    Specimen: Blood Updated: 04/16/22 0152     Glucose 195 mg/dL      BUN 17 mg/dL      Creatinine 2.25 mg/dL      Sodium 138 mmol/L      Potassium 3.6 mmol/L      Chloride 103 mmol/L      CO2 17.0 mmol/L      Calcium 7.7 mg/dL      Total Protein 4.3 g/dL      Albumin 2.00 g/dL      ALT (SGPT) 12 U/L      AST (SGOT) 23 U/L      Alkaline Phosphatase 70 U/L      Total Bilirubin 0.3 mg/dL      Globulin 2.3 gm/dL      A/G Ratio 0.9 g/dL      BUN/Creatinine Ratio 7.6     Anion Gap 18.0 mmol/L      eGFR 27.0 mL/min/1.73      Comment: National Kidney Foundation and American Society of Nephrology (ASN) Task Force recommended calculation based on the Chronic Kidney Disease Epidemiology Collaboration (CKD-EPI) equation refit without adjustment for race.       Narrative:      GFR Normal >60  Chronic Kidney Disease <60  Kidney Failure <15      POC Glucose Once [[id number]]  (Abnormal) Collected: 04/16/22 0139    Specimen: Blood Updated: 04/16/22 0151     Glucose 179 mg/dL      Comment: : [id number] Walsh AbigailMeter ID: [id number]       Blood Culture - Blood, Arm, Left [[id number]] Collected: 04/16/22 0112     Specimen: Blood from Arm, Left Updated: 04/16/22 0133    Comprehensive Metabolic Panel [[id number]]  (Abnormal) Collected: 04/15/22 2233    Specimen: Blood Updated: 04/15/22 2343     Glucose 137 mg/dL      BUN 16 mg/dL      Creatinine 2.15 mg/dL      Sodium 137 mmol/L      Potassium 3.6 mmol/L      Comment: Slight hemolysis detected by analyzer. Results may be affected.        Chloride 102 mmol/L      CO2 21.0 mmol/L      Calcium 7.9 mg/dL      Total Protein 4.6 g/dL      Albumin 2.00 g/dL      ALT (SGPT) 11 U/L      AST (SGOT) 22 U/L      Comment: Slight hemolysis detected by analyzer. Results may be affected.        Alkaline Phosphatase 71 U/L      Total Bilirubin 0.3 mg/dL      Globulin 2.6 gm/dL      A/G Ratio 0.8 g/dL      BUN/Creatinine Ratio 7.4     Anion Gap 14.0 mmol/L      eGFR 28.5 mL/min/1.73      Comment: National Kidney Foundation and American Society of Nephrology (ASN) Task Force recommended calculation based on the Chronic Kidney Disease Epidemiology Collaboration (CKD-EPI) equation refit without adjustment for race.       Narrative:      GFR Normal >60  Chronic Kidney Disease <60  Kidney Failure <15      Blood Culture With AASHISH - Blood, Arm, Right [[id number]]  (Normal) Collected: 04/14/22 1938    Specimen: Blood from Arm, Right Updated: 04/15/22 2015     Blood Culture No growth at 24 hours    Urine Culture - Urine, Urine, Catheter In/Out [[id number]]  (Abnormal) Collected: 04/14/22 1025    Specimen: Urine, Catheter In/Out Updated: 04/15/22 1311     Urine Culture >100,000 CFU/mL Gram Negative Bacilli    CBC & Differential [[id number]]  (Abnormal) Collected: 04/15/22 1150    Specimen: Blood Updated: 04/15/22 1207    Narrative:      The following orders were created for panel order CBC & Differential.  Procedure                               Abnormality         Status                     ---------                               -----------         ------                     CBC Auto Differential[[id number]]         Abnormal            Final result                 Please view results for these tests on the individual orders.    CBC Auto Differential [[id number]]  (Abnormal) Collected: 04/15/22 1150    Specimen: Blood Updated: 04/15/22 1207     WBC 15.69 10*3/mm3      RBC 2.56 10*6/mm3      Hemoglobin 7.2 g/dL      Hematocrit 21.4 %      MCV 83.6 fL      MCH 28.1 pg      MCHC 33.6 g/dL      RDW 15.2 %      RDW-SD 45.9 fl      MPV 10.0 fL      Platelets 347 10*3/mm3      Neutrophil % 71.2 %      Lymphocyte % 20.5 %      Monocyte % 6.1 %      Eosinophil % 0.1 %      Basophil % 0.3 %      Immature Grans % 1.8 %      Neutrophils, Absolute 11.17 10*3/mm3      Lymphocytes, Absolute 3.21 10*3/mm3      Monocytes, Absolute 0.96 10*3/mm3      Eosinophils, Absolute 0.01 10*3/mm3      Basophils, Absolute 0.05 10*3/mm3      Immature Grans, Absolute 0.29 10*3/mm3      nRBC 0.4 /100 WBC     Comprehensive Metabolic Panel [[id number]]  (Abnormal) Collected: 04/14/22 1116    Specimen: Blood Updated: 04/14/22 1232     Glucose 110 mg/dL      BUN 7 mg/dL      Creatinine 0.72 mg/dL      Sodium 137 mmol/L      Potassium 3.4 mmol/L      Comment: Slight hemolysis detected by analyzer. Results may be affected.        Chloride 99 mmol/L      CO2 25.0 mmol/L      Calcium 8.8 mg/dL      Total Protein 6.0 g/dL      Albumin 2.70 g/dL      ALT (SGPT) 13 U/L      AST (SGOT) 21 U/L      Comment: Slight hemolysis detected by analyzer. Results may be affected.        Alkaline Phosphatase 111 U/L      Total Bilirubin 0.5 mg/dL      Globulin 3.3 gm/dL      A/G Ratio 0.8 g/dL      BUN/Creatinine Ratio 9.7     Anion Gap 13.0 mmol/L      eGFR 105.9 mL/min/1.73      Comment: National Kidney Foundation and American Society of Nephrology (ASN) Task Force recommended calculation based on the Chronic Kidney Disease Epidemiology Collaboration (CKD-EPI) equation refit without adjustment for race.       Narrative:      GFR Normal >60  Chronic Kidney Disease <60  Kidney  Failure <15      Protime-INR [[id number]]  (Abnormal) Collected: 04/14/22 1116    Specimen: Blood Updated: 04/14/22 1219     Protime 17.0 Seconds      INR 1.44    CBC & Differential [[id number]]  (Abnormal) Collected: 04/14/22 1116    Specimen: Blood Updated: 04/14/22 1213    Narrative:      The following orders were created for panel order CBC & Differential.  Procedure                               Abnormality         Status                     ---------                               -----------         ------                     CBC Auto Differential[[id number]]        Abnormal            Final result                 Please view results for these tests on the individual orders.    CBC Auto Differential [[id number]]  (Abnormal) Collected: 04/14/22 1116    Specimen: Blood Updated: 04/14/22 1213     WBC 10.54 10*3/mm3      RBC 3.54 10*6/mm3      Hemoglobin 9.9 g/dL      Hematocrit 29.6 %      MCV 83.6 fL      MCH 28.0 pg      MCHC 33.4 g/dL      RDW 14.9 %      RDW-SD 45.7 fl      MPV 9.8 fL      Platelets 354 10*3/mm3      Neutrophil % 77.6 %      Lymphocyte % 14.7 %      Monocyte % 4.5 %      Eosinophil % 1.7 %      Basophil % 0.6 %      Immature Grans % 0.9 %      Neutrophils, Absolute 8.19 10*3/mm3      Lymphocytes, Absolute 1.55 10*3/mm3      Monocytes, Absolute 0.47 10*3/mm3      Eosinophils, Absolute 0.18 10*3/mm3      Basophils, Absolute 0.06 10*3/mm3      Immature Grans, Absolute 0.09 10*3/mm3      nRBC 0.2 /100 WBC     Urinalysis, Microscopic Only - Urine, Catheter In/Out [[id number]]  (Abnormal) Collected: 04/14/22 1025    Specimen: Urine, Catheter In/Out Updated: 04/14/22 1050     RBC, UA 0-2 /HPF      WBC, UA Too Numerous to Count /HPF      Bacteria, UA 4+ /HPF      Squamous Epithelial Cells, UA None Seen /HPF      Hyaline Casts, UA None Seen /LPF      Methodology Manual Light Microscopy    Urinalysis With Culture If Indicated - Urine, Catheter In/Out [[id number]]  (Abnormal) Collected: 04/14/22 1025     Specimen: Urine, Catheter In/Out Updated: 04/14/22 1040     Color, UA Yellow     Appearance, UA Cloudy     pH, UA >=9.0     Specific Gravity, UA 1.007     Glucose, UA Negative     Ketones, UA Trace     Bilirubin, UA Negative     Blood, UA Large (3+)     Protein, UA Trace     Leuk Esterase, UA Large (3+)     Nitrite, UA Negative     Urobilinogen, UA 0.2 E.U./dL    CBC (No Diff) [[id number]]  (Abnormal) Collected: 04/13/22 1128    Specimen: Blood Updated: 04/13/22 1140     WBC 19.48 10*3/mm3      RBC 3.97 10*6/mm3      Hemoglobin 11.1 g/dL      Hematocrit 32.7 %      MCV 82.4 fL      MCH 28.0 pg      MCHC 33.9 g/dL      RDW 15.1 %      RDW-SD 45.1 fl      MPV 9.8 fL      Platelets 373 10*3/mm3     Basic Metabolic Panel [[id number]]  (Abnormal) Collected: 04/13/22 0725    Specimen: Blood Updated: 04/13/22 0752     Glucose 83 mg/dL      BUN 8 mg/dL      Creatinine 0.70 mg/dL      Sodium 134 mmol/L      Potassium 3.8 mmol/L      Comment: Slight hemolysis detected by analyzer. Results may be affected.        Chloride 99 mmol/L      CO2 18.0 mmol/L      Calcium 8.9 mg/dL      BUN/Creatinine Ratio 11.4     Anion Gap 17.0 mmol/L      eGFR 109.5 mL/min/1.73      Comment: National Kidney Foundation and American Society of Nephrology (ASN) Task Force recommended calculation based on the Chronic Kidney Disease Epidemiology Collaboration (CKD-EPI) equation refit without adjustment for race.       Narrative:      GFR Normal >60  Chronic Kidney Disease <60  Kidney Failure <15      Basic Metabolic Panel [[id number]]  (Normal) Collected: 04/12/22 0622    Specimen: Blood Updated: 04/12/22 0732     Glucose 79 mg/dL      BUN 7 mg/dL      Creatinine 0.71 mg/dL      Sodium 141 mmol/L      Potassium 3.9 mmol/L      Chloride 105 mmol/L      CO2 24.0 mmol/L      Calcium 8.6 mg/dL      BUN/Creatinine Ratio 9.9     Anion Gap 12.0 mmol/L      eGFR 107.7 mL/min/1.73      Comment: National Kidney Foundation and American Society of Nephrology (ASN)  Task Force recommended calculation based on the Chronic Kidney Disease Epidemiology Collaboration (CKD-EPI) equation refit without adjustment for race.       Narrative:      GFR Normal >60  Chronic Kidney Disease <60  Kidney Failure <15      Iron Profile [[id number]]  (Abnormal) Collected: 04/12/22 0622    Specimen: Blood Updated: 04/12/22 0732     Iron 62 mcg/dL      Iron Saturation 64 %      Transferrin 65 mg/dL      TIBC 97 mcg/dL     CBC (No Diff) [[id number]]  (Abnormal) Collected: 04/12/22 0622    Specimen: Blood Updated: 04/12/22 0711     WBC 8.15 10*3/mm3      RBC 3.64 10*6/mm3      Hemoglobin 10.1 g/dL      Hematocrit 30.9 %      MCV 84.9 fL      MCH 27.7 pg      MCHC 32.7 g/dL      RDW 15.3 %      RDW-SD 47.4 fl      MPV 9.7 fL      Platelets 287 10*3/mm3     Folate [[id number]]  (Abnormal) Collected: 04/11/22 1746    Specimen: Blood Updated: 04/12/22 0211     Folate 4.00 ng/mL     Narrative:      Results may be falsely increased if patient taking Biotin.      Vitamin B12 [[id number]]  (Abnormal) Collected: 04/11/22 1746    Specimen: Blood Updated: 04/12/22 0211     Vitamin B-12 1,079 pg/mL     Narrative:      Results may be falsely increased if patient taking Biotin.      Ammonia [[id number]]  (Normal) Collected: 04/11/22 1746    Specimen: Blood Updated: 04/11/22 1806     Ammonia 20 umol/L     Magnesium [[id number]]  (Normal) Collected: 04/11/22 1746    Specimen: Blood Updated: 04/11/22 1800     Magnesium 1.8 mg/dL     Comprehensive Metabolic Panel [[id number]]  (Abnormal) Collected: 04/11/22 0752    Specimen: Blood Updated: 04/11/22 0909     Glucose 102 mg/dL      BUN 7 mg/dL      Creatinine 0.70 mg/dL      Sodium 139 mmol/L      Potassium 3.3 mmol/L      Chloride 106 mmol/L      CO2 23.0 mmol/L      Calcium 8.3 mg/dL      Total Protein 4.8 g/dL      Albumin 2.30 g/dL      ALT (SGPT) 9 U/L      AST (SGOT) 20 U/L      Alkaline Phosphatase 87 U/L      Total Bilirubin 0.4 mg/dL      Globulin 2.5 gm/dL      A/G  Ratio 0.9 g/dL      BUN/Creatinine Ratio 10.0     Anion Gap 10.0 mmol/L      eGFR 109.5 mL/min/1.73      Comment: National Kidney Foundation and American Society of Nephrology (ASN) Task Force recommended calculation based on the Chronic Kidney Disease Epidemiology Collaboration (CKD-EPI) equation refit without adjustment for race.       Narrative:      GFR Normal >60  Chronic Kidney Disease <60  Kidney Failure <15      CBC & Differential [[id number]]  (Abnormal) Collected: 04/11/22 0752    Specimen: Blood Updated: 04/11/22 0813    Narrative:      The following orders were created for panel order CBC & Differential.  Procedure                               Abnormality         Status                     ---------                               -----------         ------                     CBC Auto Differential[[id number]]        Abnormal            Final result                 Please view results for these tests on the individual orders.    CBC Auto Differential [[id number]]  (Abnormal) Collected: 04/11/22 0752    Specimen: Blood Updated: 04/11/22 0813     WBC 8.88 10*3/mm3      RBC 3.45 10*6/mm3      Hemoglobin 9.7 g/dL      Hematocrit 28.5 %      MCV 82.6 fL      MCH 28.1 pg      MCHC 34.0 g/dL      RDW 15.4 %      RDW-SD 46.4 fl      MPV 9.1 fL      Platelets 256 10*3/mm3      Neutrophil % 66.1 %      Lymphocyte % 20.9 %      Monocyte % 7.4 %      Eosinophil % 4.2 %      Basophil % 0.5 %      Immature Grans % 0.9 %      Neutrophils, Absolute 5.87 10*3/mm3      Lymphocytes, Absolute 1.86 10*3/mm3      Monocytes, Absolute 0.66 10*3/mm3      Eosinophils, Absolute 0.37 10*3/mm3      Basophils, Absolute 0.04 10*3/mm3      Immature Grans, Absolute 0.08 10*3/mm3      nRBC 0.0 /100 WBC     Lipid Panel [[id number]]  (Abnormal) Collected: 04/10/22 0920    Specimen: Blood Updated: 04/10/22 0954     Total Cholesterol 164 mg/dL      Triglycerides 144 mg/dL      HDL Cholesterol 64 mg/dL      LDL Cholesterol  75 mg/dL      VLDL  Cholesterol 25 mg/dL      LDL/HDL Ratio 1.11    Narrative:      Cholesterol Reference Ranges  (U.S. Department of Health and Human Services ATP III Classifications)    Desirable          <200 mg/dL  Borderline High    200-239 mg/dL  High Risk          >240 mg/dL      Triglyceride Reference Ranges  (U.S. Department of Health and Human Services ATP III Classifications)    Normal           <150 mg/dL  Borderline High  150-199 mg/dL  High             200-499 mg/dL  Very High        >500 mg/dL    HDL Reference Ranges  (U.S. Department of Health and Human Services ATP III Classifications)    Low     <40 mg/dl (major risk factor for CHD)  High    >60 mg/dl ('negative' risk factor for CHD)        LDL Reference Ranges  (U.S. Department of Health and Human Services ATP III Classifications)    Optimal          <100 mg/dL  Near Optimal     100-129 mg/dL  Borderline High  130-159 mg/dL  High             160-189 mg/dL  Very High        >189 mg/dL    Basic Metabolic Panel [[id number]]  (Abnormal) Collected: 04/10/22 0920    Specimen: Blood Updated: 04/10/22 0954     Glucose 101 mg/dL      BUN 11 mg/dL      Creatinine 0.82 mg/dL      Sodium 139 mmol/L      Potassium 4.8 mmol/L      Comment: Slight hemolysis detected by analyzer. Results may be affected.        Chloride 108 mmol/L      CO2 15.0 mmol/L      Calcium 9.1 mg/dL      BUN/Creatinine Ratio 13.4     Anion Gap 16.0 mmol/L      eGFR 90.6 mL/min/1.73      Comment: National Kid[employer] and American Society of Nephrology (ASN) Task Force recommended calculation based on the Chronic Kidney Disease Epidemiology Collaboration (CKD-EPI) equation refit without adjustment for race.       Narrative:      GFR Normal >60  Chronic Kidney Disease <60  Kidney Failure <15      CBC (No Diff) [[id number]]  (Abnormal) Collected: 04/10/22 0920    Specimen: Blood Updated: 04/10/22 0931     WBC 11.73 10*3/mm3      RBC 3.68 10*6/mm3      Hemoglobin 10.3 g/dL      Hematocrit 30.7 %      MCV 83.4  fL      MCH 28.0 pg      MCHC 33.6 g/dL      RDW 15.6 %      RDW-SD 46.8 fl      MPV 9.1 fL      Platelets 241 10*3/mm3           .  Imaging Results (Last 24 Hours)     Procedure Component Value Units Date/Time    CT Abdomen Pelvis With Contrast [[id number]] Collected: 04/15/22 2114     Updated: 04/15/22 2146    Addenda:        Filling defect in right lower lobe pulmonary arteries present on image 7  suspicious for pulmonary embolus     Attempts to call extension 2/3/2004 the liver masses were unsuccessful  This report was finalized on 04/15/2022 21:43 by Dr. Nadeem Marshall MD.  Signed: 04/15/22 2143 by Nadeem Marshall MD    Narrative:      EXAMINATION:   CT ABDOMEN PELVIS W CONTRAST-  4/15/2022 9:14 PM CDT     HISTORY: CT ABDOMEN AND PELVIS WITH CONTRAST 4/15/2022 8:50 PM CDT     HISTORY: Abdominal abscess     COMPARISON: April 7, 2022.      DLP: 1367 mGy cm     TECHNIQUE: Following the intravenous administration of contrast, helical  CT tomographic images of the abdomen and pelvis were acquired. Coronal  reformatted images were also provided for review.      FINDINGS:   In the right lung on axial image 7 of filling defect is present probably  a small pulmonary embolus..      LIVER: No focal liver lesion. The hepatic vasculature is patent.      BILIARY SYSTEM: The gallbladder is unremarkable. No intrahepatic or  extrahepatic ductal dilatation.      PANCREAS: No focal pancreatic lesion.      SPLEEN: Unremarkable.      KIDNEYS AND ADRENALS: Adrenal glands are visualized. Renal contours are  normal in size shape position. A left external percutaneous nephrostomy  internal nephroureterostomy tube is present. The distal tip is  satisfactorily positioned in the bladder.. The ureters are decompressed  and normal in appearance.     RETROPERITONEUM: No mass, lymphadenopathy or hemorrhage.      GI TRACT: Air and fluid is present in the colon. Diarrhea may be  present.. The appendix is visualized and unremarkable.     OTHER:  There is no mesenteric mass, lymphadenopathy or fluid collection.  The abdominopelvic vasculature is patent. The osseous structures and  soft tissues demonstrate no worrisome lesions.     PELVIS: A fat-containing left inguinal hernia is present. There is soft  tissue swelling possibly an abscess associated with the left abductor  katie muscle.. The bladder is collapsed around a Mojica catheter..       Impression:      1. Suspicious for abscess in the left abductor katie muscle.  2. Left external/internal percutaneous nephrostomy ureterostomy catheter  is satisfactorily position.  3. Fluid and air is present in the colon. This may be associated with  diarrhea.  4. Mojica catheter is present..         This report was finalized on 04/15/2022 21:24 by Dr. Nadeem Marshall MD.              Impression    · Metabolic encephalopathy superimposed on possible Covid encephalopathy verses other especially since this seems to be progressing  · Anemia with hemoglobin of 4.8 and hypotension currently a contributing factor  · Hypokalemic of 3.1  · Hypomagnesemia of 1.5  · Renal failure  · Multiple infections--UTIs ? thigh abscess but maybe etc.    Plan    Head CT without  EEG --unable to obtain now due to tech no longer on call but will be tomorrow  Consider LP depending on results      ADDENDUM: No changes on Head CT in comparison to one done 4/11/22  Will obtain MRI    I discussed the patients findings and my recommendations with nursing staff and DR Villarreal     45 minutes of critical care time was performed ,during this time I consulted with the nursing staff and also with other providers in regard to the patient's care. I reviewed records as she is unable to provide a history, examined the patient reviewed labs and personally reviewed neuroimages of head CT  For a 46 year old she appears to have atrophy       Abiola Titus MD  04/16/22  10:18 CDT

## 2022-04-16 NOTE — PROGRESS NOTES
[facility] Medicine Services  INPATIENT PROGRESS NOTE    Length of Stay: 12  Date of Admission: 4/2/2022  Primary Care Physician: David Lara MD    Subjective   Chief Complaint: Failure to thrive/metabolic acidosis    HPI   Hemoglobin dropped to 4 last night, 2 units of blood is currently being given.  We will check hemoglobin after transfusion.  Abscess left thigh from CT scan, general surgery consult.  Call [place] today and keep him updated what is going on with the patient seems to change her mind to excepting.  Blood pressure is low, requiring Levophed.  Metabolic acidosis, change IV fluid to bicarb drip for now.  T-max 99.5.  T-current 97.8.  Altered ental status change possibly from anemia, CT scan order after discussion with neurology.    Review of Systems   Patient is arousable, refusing to talk.  No sign of acute distress.    All pertinent negatives and positives are as above. All other systems have been reviewed and are negative unless otherwise stated.     Objective    Temp:  [95.6 °F (35.3 °C)-99.5 °F (37.5 °C)] 97.8 °F (36.6 °C)  Heart Rate:  [] 100  Resp:  [13-23] 13  BP: ()/(37-77) 119/66    Intake/Output Summary (Last 24 hours) at 4/16/2022 0948  Last data filed at 4/16/2022 0730  Gross per 24 hour   Intake 1950 ml   Output 400 ml   Net 1550 ml     Physical Exam  Constitutional:       Appearance: She is well-developed.   HENT:      Head: Normocephalic.   Eyes:      Conjunctiva/sclera: Conjunctivae normal.      Pupils: Pupils are equal, round, and reactive to light.   Neck:      Vascular: No JVD.   Cardiovascular:      Rate and Rhythm: Regular rhythm.  Rates in the 100 .     Heart sounds: Normal heart sounds. No murmur heard.    No friction rub. No gallop.   Pulmonary:      Effort: No respiratory distress.      Breath sounds: No wheezing or rales.      Comments: Diminished breath sound bilateral, clear .  Chest:      Chest wall: No  tenderness.   Abdominal:      General: Bowel sounds are normal. There is no distension.      Palpations: Abdomen is soft.      Tenderness: There is no abdominal tenderness. There is no guarding or rebound.   Musculoskeletal:         General: No tenderness or deformity.      Cervical back: Neck supple.   Skin:     General: Skin is warm and dry.      Capillary Refill: Capillary refill takes 2 to 3 seconds.      Findings: No rash.   Neurological:      Cranial Nerves: No cranial nerve deficit.      Motor: Weakness present. No abnormal muscle tone.      Coordination: Coordination abnormal.      Gait: Gait abnormal.      Deep Tendon Reflexes: Reflexes normal.   Results Review:  Lab Results (last 24 hours)     Procedure Component Value Units Date/Time    Blood Culture - Blood, Arm, Left [[id number]] Collected: 04/16/22 0346    Specimen: Blood from Arm, Left Updated: 04/16/22 0406    CBC & Differential [[id number]]  (Abnormal) Collected: 04/16/22 0200    Specimen: Blood from Arm, Left Updated: 04/16/22 0223    Narrative:      The following orders were created for panel order CBC & Differential.  Procedure                               Abnormality         Status                     ---------                               -----------         ------                     CBC Auto Differential[[id number]]        Abnormal            Final result                 Please view results for these tests on the individual orders.    CBC Auto Differential [[id number]]  (Abnormal) Collected: 04/16/22 0200    Specimen: Blood from Arm, Left Updated: 04/16/22 0223     WBC 15.70 10*3/mm3      RBC 1.72 10*6/mm3      Hemoglobin 4.8 g/dL      Hematocrit 15.8 %      MCV 91.9 fL      MCH 27.9 pg      MCHC 30.4 g/dL      RDW 15.5 %      RDW-SD 51.4 fl      MPV 10.0 fL      Platelets 301 10*3/mm3      Neutrophil % 71.6 %      Lymphocyte % 16.8 %      Monocyte % 7.7 %      Eosinophil % 0.3 %      Basophil % 0.2 %      Immature Grans % 3.4 %       Neutrophils, Absolute 11.25 10*3/mm3      Lymphocytes, Absolute 2.63 10*3/mm3      Monocytes, Absolute 1.21 10*3/mm3      Eosinophils, Absolute 0.04 10*3/mm3      Basophils, Absolute 0.03 10*3/mm3      Immature Grans, Absolute 0.54 10*3/mm3      nRBC 0.4 /100 WBC     Comprehensive Metabolic Panel [[id number]]  (Abnormal) Collected: 04/16/22 0112    Specimen: Blood Updated: 04/16/22 0152     Glucose 195 mg/dL      BUN 17 mg/dL      Creatinine 2.25 mg/dL      Sodium 138 mmol/L      Potassium 3.6 mmol/L      Chloride 103 mmol/L      CO2 17.0 mmol/L      Calcium 7.7 mg/dL      Total Protein 4.3 g/dL      Albumin 2.00 g/dL      ALT (SGPT) 12 U/L      AST (SGOT) 23 U/L      Alkaline Phosphatase 70 U/L      Total Bilirubin 0.3 mg/dL      Globulin 2.3 gm/dL      A/G Ratio 0.9 g/dL      BUN/Creatinine Ratio 7.6     Anion Gap 18.0 mmol/L      eGFR 27.0 mL/min/1.73      Comment: National Kidney Foundation and American Society of Nephrology (ASN) Task Force recommended calculation based on the Chronic Kidney Disease Epidemiology Collaboration (CKD-EPI) equation refit without adjustment for race.       Narrative:      GFR Normal >60  Chronic Kidney Disease <60  Kidney Failure <15      POC Glucose Once [[id number]]  (Abnormal) Collected: 04/16/22 0139    Specimen: Blood Updated: 04/16/22 0151     Glucose 179 mg/dL      Comment: : [id number] Walsh AbigailMeter ID: [id number]       Blood Culture - Blood, Arm, Left [[id number]] Collected: 04/16/22 0112    Specimen: Blood from Arm, Left Updated: 04/16/22 0133    Comprehensive Metabolic Panel [[id number]]  (Abnormal) Collected: 04/15/22 2233    Specimen: Blood Updated: 04/15/22 2343     Glucose 137 mg/dL      BUN 16 mg/dL      Creatinine 2.15 mg/dL      Sodium 137 mmol/L      Potassium 3.6 mmol/L      Comment: Slight hemolysis detected by analyzer. Results may be affected.        Chloride 102 mmol/L      CO2 21.0 mmol/L      Calcium 7.9 mg/dL      Total Protein 4.6 g/dL       Albumin 2.00 g/dL      ALT (SGPT) 11 U/L      AST (SGOT) 22 U/L      Comment: Slight hemolysis detected by analyzer. Results may be affected.        Alkaline Phosphatase 71 U/L      Total Bilirubin 0.3 mg/dL      Globulin 2.6 gm/dL      A/G Ratio 0.8 g/dL      BUN/Creatinine Ratio 7.4     Anion Gap 14.0 mmol/L      eGFR 28.5 mL/min/1.73      Comment: National Kidney Foundation and American Society of Nephrology (ASN) Task Force recommended calculation based on the Chronic Kidney Disease Epidemiology Collaboration (CKD-EPI) equation refit without adjustment for race.       Narrative:      GFR Normal >60  Chronic Kidney Disease <60  Kidney Failure <15      Blood Culture With AASHISH - Blood, Arm, Right [[id number]]  (Normal) Collected: 04/14/22 1938    Specimen: Blood from Arm, Right Updated: 04/15/22 2015     Blood Culture No growth at 24 hours    Urine Culture - Urine, Urine, Catheter In/Out [[id number]]  (Abnormal) Collected: 04/14/22 1025    Specimen: Urine, Catheter In/Out Updated: 04/15/22 1311     Urine Culture >100,000 CFU/mL Gram Negative Bacilli    CBC & Differential [[id number]]  (Abnormal) Collected: 04/15/22 1150    Specimen: Blood Updated: 04/15/22 1207    Narrative:      The following orders were created for panel order CBC & Differential.  Procedure                               Abnormality         Status                     ---------                               -----------         ------                     CBC Auto Differential[[id number]]        Abnormal            Final result                 Please view results for these tests on the individual orders.    CBC Auto Differential [[id number]]  (Abnormal) Collected: 04/15/22 1150    Specimen: Blood Updated: 04/15/22 1207     WBC 15.69 10*3/mm3      RBC 2.56 10*6/mm3      Hemoglobin 7.2 g/dL      Hematocrit 21.4 %      MCV 83.6 fL      MCH 28.1 pg      MCHC 33.6 g/dL      RDW 15.2 %      RDW-SD 45.9 fl      MPV 10.0 fL      Platelets 347 10*3/mm3       Neutrophil % 71.2 %      Lymphocyte % 20.5 %      Monocyte % 6.1 %      Eosinophil % 0.1 %      Basophil % 0.3 %      Immature Grans % 1.8 %      Neutrophils, Absolute 11.17 10*3/mm3      Lymphocytes, Absolute 3.21 10*3/mm3      Monocytes, Absolute 0.96 10*3/mm3      Eosinophils, Absolute 0.01 10*3/mm3      Basophils, Absolute 0.05 10*3/mm3      Immature Grans, Absolute 0.29 10*3/mm3      nRBC 0.4 /100 WBC            Cultures:  Blood Culture   Date Value Ref Range Status   04/14/2022 No growth at 24 hours  Preliminary     Urine Culture   Date Value Ref Range Status   04/14/2022 >100,000 CFU/mL Gram Negative Bacilli (A)  Preliminary       Radiology Data:    Imaging Results (Last 24 Hours)     Procedure Component Value Units Date/Time    CT Abdomen Pelvis With Contrast [[id number]] Collected: 04/15/22 2114     Updated: 04/15/22 2146    Addenda:        Filling defect in right lower lobe pulmonary arteries present on image 7  suspicious for pulmonary embolus     Attempts to call extension 2/3/2004 the liver masses were unsuccessful  This report was finalized on 04/15/2022 21:43 by Dr. Nadeem Marshall MD.  Signed: 04/15/22 2143 by Nadeem Marshall MD    Narrative:      EXAMINATION:   CT ABDOMEN PELVIS W CONTRAST-  4/15/2022 9:14 PM CDT     HISTORY: CT ABDOMEN AND PELVIS WITH CONTRAST 4/15/2022 8:50 PM CDT     HISTORY: Abdominal abscess     COMPARISON: April 7, 2022.      DLP: 1367 mGy cm     TECHNIQUE: Following the intravenous administration of contrast, helical  CT tomographic images of the abdomen and pelvis were acquired. Coronal  reformatted images were also provided for review.      FINDINGS:   In the right lung on axial image 7 of filling defect is present probably  a small pulmonary embolus..      LIVER: No focal liver lesion. The hepatic vasculature is patent.      BILIARY SYSTEM: The gallbladder is unremarkable. No intrahepatic or  extrahepatic ductal dilatation.      PANCREAS: No focal pancreatic lesion.       SPLEEN: Unremarkable.      KIDNEYS AND ADRENALS: Adrenal glands are visualized. Renal contours are  normal in size shape position. A left external percutaneous nephrostomy  internal nephroureterostomy tube is present. The distal tip is  satisfactorily positioned in the bladder.. The ureters are decompressed  and normal in appearance.     RETROPERITONEUM: No mass, lymphadenopathy or hemorrhage.      GI TRACT: Air and fluid is present in the colon. Diarrhea may be  present.. The appendix is visualized and unremarkable.     OTHER: There is no mesenteric mass, lymphadenopathy or fluid collection.  The abdominopelvic vasculature is patent. The osseous structures and  soft tissues demonstrate no worrisome lesions.     PELVIS: A fat-containing left inguinal hernia is present. There is soft  tissue swelling possibly an abscess associated with the left abductor  katie muscle.. The bladder is collapsed around a Mojica catheter..       Impression:      1. Suspicious for abscess in the left abductor katie muscle.  2. Left external/internal percutaneous nephrostomy ureterostomy catheter  is satisfactorily position.  3. Fluid and air is present in the colon. This may be associated with  diarrhea.  4. Mojica catheter is present..         This report was finalized on 04/15/2022 21:24 by Dr. Nadeem Marshall MD.          Allergies   Allergen Reactions   • Coconut Unknown - High Severity   • Nuts Unknown - High Severity   • Penicillins    • [place] Other (See Comments)     Causes migraines per pt reports       Scheduled meds:   ertapenem, 1 g, Intravenous, Q24H  famotidine, 20 mg, Intravenous, Q12H  FLUoxetine, 20 mg, Oral, Nightly  fluticasone, 2 spray, Each Nare, BID  folic acid, 1 mg, Oral, Daily  metoprolol succinate XL, 50 mg, Oral, Q24H  neomycin-polymyxin-hydrocortisone, 4 drop, Both Ears, Q6H  oxymetazoline, 2 spray, Nasal, BID  sodium bicarbonate, 650 mg, Oral, BID  sodium chloride, 10 mL, Intravenous, Q12H  SUMAtriptan, 50  mg, Oral, Once  vancomycin, 750 mg, Intravenous, Q24H        PRN meds:  hydrALAZINE  •  labetalol  •  ondansetron **OR** ondansetron  •  Pharmacy to Dose enoxaparin (LOVENOX)  •  sodium chloride  •  [COMPLETED] Insert peripheral IV **AND** sodium chloride  •  [COMPLETED] Insert peripheral IV **AND** sodium chloride  •  sodium chloride    Assessment/Plan       Intractable nausea and vomiting    Sepsis secondary to UTI (HCC)    Lactic acidosis    Hypokalemia    Essential (primary) hypertension    UTI (urinary tract infection), bacterial    Malfunction of nephrostomy tube (HCC)    Severe malnutrition (CMS/HCC)    Hypophosphatemia      Plan:  HPI.  Patient was admitted on 4/3 by Dr. Ricks.  Had COVID-19 here last year and ended up having an abdominal hematoma and had to go to [facility].  Was there for a long time and then an LTAC and then a skilled nursing facility.  Comes back with multidrug-resistant UTI on Invanz.   She presented with complaints of intractable nausea and vomiting.  Her medical problems started last August when she developed COVID-19 pneumonia and had numerous complications thereafter.  She developed an abdominal hematoma and had to go on dialysis secondary to extrinsic compression of her urinary system. She was transferred from here to [facility] and required exploratory laparotomy.  She ended up having bilateral percutaneous nephrostomy tubes and also ended up with a left ureteral stent.  She states that her main urologist is Dr. Rina Rey.  She states that she stopped producing urine from her percutaneous nephrostomy tube recently and that there are plans for it to be removed.  She states that she had gotten in touch with [facility] to see if someone here could do it so she would not have to travel.  When she left [facility], she spent considerable time at a skilled nursing facility in [place], [place].  She states that she has only been home a few weeks.  She was admitted to  [facility] in [place] on 3/9 for electrolyte abnormalities.     UTI.  Renal function stable yesterday with a creatinine 0.76.  Dr. Ricks began treating a urinary tract infection with ceftriaxone.  She had a multidrug-resistant Klebsiella in October 2021.  I transitioned her to [facility] on 4/3.  Lactate down to 1.7 from 2.8 after fluids.  Urine does appear infected with too numerous to count white blood cells, 4+ bacteria and large leukocyte esterase. The sample was also bloody.  Urine culture has grown 2 distinct pathogens.  There is a multidrug-resistant Proteus mirabilis strain and an ESBL Klebsiella strain.  Invanz should be treating both of these organisms concurrently.  Today is day 5 of [facility].  We will treat 7-10 days.  Lactic acidosis improved after fluids and antibiotics.  Procalcitonin elevated at 0.31.  Dr. Dyer is familiar with her previous problems as he saw her last year.  I consulted him to evaluate her.  He wants to treat her current infection and then have her follow back at [facility].  Recommendation from urology-  Urology saw her and wants her to go back to [facility] to have her percutaneous nephrostomy pulled.  Long discussion with Dr. Haro yesterday urology at [facility], covering for Dr. Rey.  Recommended for infectious disease consult and [place] cath placement.  Discussed with transfer line this morning saying that Dr. Rey will call me today to reevaluated.  Urology  believes his refluxing causing recurrent infection, and recommended stratton cath placement. PCNU, stent place in [facility].  [facility] refused transfer 4/15/2022.  I gave a call  to [facility] today discussed a decline in mental status and hemoglobin drop and possible abscess in the left abductor katie muscles 4/16/22.  CT scan abdomen pelvic-Suspicious for abscess in the left abductor katie muscle, Left external/internal percutaneous nephrostomy ureterostomy catheter  is satisfactorily position, Fluid and air is  present in the colon- associated with diarrhea.  Patient is on Invanz and vancomycin.    Abscess left abductor katie muscles-General surgery consult.    Anemia/questionable bleeding.  Status post 2 units of blood.  Stop Xarelto yesterday.     Hypokalemia .  P.o. potassium..  Magnesium-normal.     Sludge in gallbladder/dysfunctional gallbladder?.  General surgery consult.  GI consult. No surgery per general surgery.  CT scan abdomen pelvic-persistent mild but improved urothelial thickening involving the left renal pelvis and proximal left ureter- related to resolving UTI,  internal/external left-sided nephroureterostomy tube appears in satisfactory position, mild left-sided hydronephrosis which is decreased, Small amount gas is noted in the bladder,  No significant bladder wall thickening is identified, Small left pleural effusion- Increased hazy airspace opacities in the lung bases favored to represent diffuse atelectasis,  Based on the volume of gas within the GI tract- mild ileus may be present,  No evidence of bowel obstruction, Small amount fluid within the upper vagina where previously there was a small amount of gas, Hepatic steatosis.  HIDA scan-20% biliary ejection fraction.  Ultrasound abdomen pelvic-Sludge-filled gallbladder with no significant gallbladder wall thickening, pericholecystic fluid or convincing evidence of acute cholecystitis, Mild dilation of the common hepatic duct with no visualized choledocholithiasis, Hepatic steatosis.     Metabolic acidosis.  Bicarb drip.  P.o. bicarb.     Nausea/vomiting.  Nausea vomiting resolved.  DC Reglan.  Protonix. Bicarb. DC Inapsine as needed.  DC Compazine as needed. DC Phenergan as needed.   Plan for NG tube placement.    Hyppotension/tachycardia.  Requiring Levophed.  Stop Toprol    Sinus congestion/sinus pain.  Flonase.  Corticosporin eardrop.  ENT consult.  Afrin.  Dr. Nichols saw her and has wanted to do an audiogram, but she has declined to go over to  his office the last 2 days.   CTA chest-No evidence of pulmonary embolus, Small LEFT pleural effusion, Bandlike areas of reticulation throughout both lungs likely representing scarring/fibrosis related to prior Covid 19 infection.      History of pulmonary embolus.  Hold Xarelto possible surgery.    DC Lovenox and put patient back on Xarelto 4/11/2022, DC Xarelto/15/22 due to anemia and possible bleeding.     Anxiety/depression/insomnia . Decrease Prozac nightly due to somnolence.   DC Ambien as needed due to somnolence.  DC trazodone due to some.    Altered mental status.    DC Fioricet as needed due to somnolence.  Reconsult neurology.  Plan for another CT scan of the head discussed with neurology today.     Pain.  DC Robaxin as needed due to somnolence. Morphine as needed.      Anemia.  Folate deficiency . p.o. folate.  No sign of acute bleed.  Hemoglobin worsening.     Allergic rhinitis.  Flonase.     Nutrition.  Start NG tube feeding.     Deconditioning.  PT and OT consult.  Last time patient walk was in August of last year after Covid. Patient is mostly bedbound at the nursing home.     Blood culture pending.   Covid-19-negative.  Flu screen A and B-negative.  Urine culture gram-negative bacilli.     Discharge Planning: 3-6 days.  Long discussion with Dr. Haro urology at [facility] yesterday, covering for Dr. Rey.  Recommended for infectious disease consult and Mojiac cath placement.  Discussed with transplant this morning saying that Dr. Rey will call me today to be evaluated.    Electronically signed by Amado Villarreal MD, 04/16/22, 9:48 AM CDT.

## 2022-04-16 NOTE — PROCEDURES
"Insert Central Line At Bedside    Date/Time: 4/16/2022 6:28 AM  Performed by: David Hernández MD  Authorized by: David Hernández MD   Consent: The procedure was performed in an emergent situation.  Required items: required blood products, implants, devices, and special equipment available  Patient identity confirmed: provided demographic data and arm band  Time out: Immediately prior to procedure a \"time out\" was called to verify the correct patient, procedure, equipment, support staff and site/side marked as required.  Indications: vascular access  Anesthesia: local infiltration    Anesthesia:  Local Anesthetic: lidocaine 1% without epinephrine  Anesthetic total: 5 mL    Sedation:  Patient sedated: no    Preparation: skin prepped with Betadine  Skin prep agent dried: skin prep agent completely dried prior to procedure  Sterile barriers: all five maximum sterile barriers used - cap, mask, sterile gown, sterile gloves, and large sterile sheet  Hand hygiene: hand hygiene performed prior to central venous catheter insertion  Location details: right femoral  Site selection rationale: Patient anticoagulated needing emergent access   Patient position: flat  Catheter type: triple lumen  Catheter size: 7.5 Fr  Pre-procedure: landmarks identified  Ultrasound guidance: no  Number of attempts: 1  Successful placement: yes  Post-procedure: line sutured and dressing applied  Assessment: free fluid flow and blood return through all ports  Patient tolerance: patient tolerated the procedure well with no immediate complications        "

## 2022-04-16 NOTE — CONSULTS
Tiffanie Scott MD Consult Note      Patient Care Team:  David Lara MD as PCP - General  David Lara MD as PCP - Family Medicine    Chief complaint possible left abductor major abscess    Subjective .     History of present illness: Very complex situation.  Case discussed with Dr. Villarreal.  Patient had necrotic bladder with rupture is had procedures done at [facility] for this.  She currently has a percutaneous nephrostomy tube and stent in place.  She had pulmonary emboli over the summer from COVID and was on anticoagulation treatment.  She developed a significant hematoma which causing renal failure and dialysis which led to the necrosis of the bladder.  She was admitted to the medicine service for failure to thrive.  She is now in the ICU with concern for acute neurologic event overnight.  A CT scan of the abdomen was performed which among other things had concern for left abductor major abscess.  I been asked to evaluate.  She is unable to provide any meaningful history.  Review of Systems  Pertinent items are noted in HPI, all other systems reviewed and negative    History  Past Medical History:   Diagnosis Date   • Angina at rest (HCC)    • Migraine     Sees Pain Management for injections.    • MVP (mitral valve prolapse)    • Renal disorder    • Syncope    ,   Past Surgical History:   Procedure Laterality Date   • BREAST BIOPSY Right 2011    benign   • INSERTION HEMODIALYSIS CATHETER Left 9/16/2021    Procedure: HEMODIALYSIS CATHETER INSERTION;  Surgeon: Anders Kaur MD;  Location: [place] [address];  Service: Vascular;  Laterality: Left;   • TONSILLECTOMY     ,   Family History   Problem Relation Age of Onset   • Colon cancer Maternal Aunt 52   • Fibromyalgia Mother    • Heart disease Mother    • Hypertension Mother    • Hodgkin's lymphoma Paternal Grandmother    • Ovarian cancer Neg Hx    • Breast cancer Neg Hx    ,   Social History     Tobacco Use   • Smoking status: Never  Smoker   • Smokeless tobacco: Never Used   Substance Use Topics   • Alcohol use: No   • Drug use: No   ,   Medications Prior to Admission   Medication Sig Dispense Refill Last Dose   • butalbital-acetaminophen-caffeine (FIORICET, ESGIC) -40 MG per tablet Take 2 tablets by mouth Every 4 (Four) Hours As Needed for Headache or Migraine.      • eszopiclone (LUNESTA) 3 MG tablet Take 3 mg by mouth Every Night. Take immediately before bedtime      • FLUoxetine (PROzac) 40 MG capsule Take 40 mg by mouth Daily.      • methocarbamol (ROBAXIN) 500 MG tablet Take 500 mg by mouth 3 (Three) Times a Day As Needed for Muscle Spasms.      • metoclopramide (REGLAN) 5 MG/5ML solution Take 5 mg by mouth 3 (Three) Times a Day Before Meals.      • ondansetron ODT (ZOFRAN-ODT) 4 MG disintegrating tablet Place 4 mg on the tongue 4 (Four) Times a Day As Needed for Nausea or Vomiting.      • oxymetazoline (AFRIN) 0.05 % nasal spray 2 sprays into the nostril(s) as directed by provider 2 (Two) Times a Day.      • prochlorperazine (COMPAZINE) 10 MG tablet Take 10 mg by mouth Every 8 (Eight) Hours As Needed for Nausea or Vomiting.      • promethazine (PHENERGAN) 25 MG tablet Take 25-50 mg by mouth Every 6 (Six) Hours As Needed for Nausea or Vomiting.      • rivaroxaban (XARELTO) 20 MG tablet Take 20 mg by mouth Every Night.      • rizatriptan (MAXALT) 10 MG tablet Take 10 mg by mouth 1 (One) Time As Needed for Migraine. May repeat in 2 hours if needed      • traZODone (DESYREL) 100 MG tablet Take 100 mg by mouth Every Night.      • Calcium Carbonate-Simethicone 750-80 MG chewable tablet Chew 1 tablet Daily.      • pantoprazole (PROTONIX) 20 MG EC tablet Take 20 mg by mouth Daily.      , Scheduled Meds:  ertapenem, 1 g, Intravenous, Q24H  famotidine, 20 mg, Intravenous, Q12H  FLUoxetine, 20 mg, Oral, Nightly  fluticasone, 2 spray, Each Nare, BID  folic acid, 1 mg, Oral, Daily  neomycin-polymyxin-hydrocortisone, 4 drop, Both Ears,  Q6H  oxymetazoline, 2 spray, Nasal, BID  sodium bicarbonate, 650 mg, Oral, BID  sodium chloride, 10 mL, Intravenous, Q12H  SUMAtriptan, 50 mg, Oral, Once  vancomycin, 750 mg, Intravenous, Q24H    , Continuous Infusions:  norepinephrine, 0.02-0.3 mcg/kg/min, Last Rate: 0.04 mcg/kg/min (04/16/22 0645)  Pharmacy to Dose enoxaparin (LOVENOX),   sodium bicarbonate, 100 mEq    , PRN Meds:  hydrALAZINE  •  labetalol  •  ondansetron **OR** ondansetron  •  Pharmacy to Dose enoxaparin (LOVENOX)  •  sodium chloride  •  [COMPLETED] Insert peripheral IV **AND** sodium chloride  •  [COMPLETED] Insert peripheral IV **AND** sodium chloride  •  sodium chloride and Allergies:  Coconut, Nuts, Penicillins, and [place]    Objective     Vital Signs   Temp:  [95.6 °F (35.3 °C)-99.5 °F (37.5 °C)] 97.8 °F (36.6 °C)  Heart Rate:  [] 100  Resp:  [13-23] 13  BP: ()/(37-77) 119/66    Intake & Output (last 3 days)       04/13 0701  04/14 0700 04/14 0701  04/15 0700 04/15 0701  04/16 0700 04/16 0701  04/17 0700    P.O. 0 0 0     I.V. (mL/kg) 300 (4.4) 1432 (21.1) 900 (13.8)     Blood    300    NG/GT  30      IV Piggyback   750     Total Intake(mL/kg) 300 (4.4) 1462 (21.5) 1650 (25.2) 300 (4.6)    Urine (mL/kg/hr) 1700 (1) 300 (0.2) 300 (0.2) 100 (0.5)    Emesis/NG output   0     Stool   0     Total Output 1700 300 300 100    Net -1400 +1162 +1350 +200            Urine Unmeasured Occurrence 1 x       Stool Unmeasured Occurrence   5 x     Emesis Unmeasured Occurrence   2 x            Physical Exam:     General Appearance:   Awake, unresponsive   Head:    Normocephalic, without obvious abnormality, atraumatic   Eyes:            Lids and lashes normal, conjunctivae and sclerae normal, no   icterus, no pallor, corneas clear, PERRLA   Ears:    Ears appear intact with no abnormalities noted   Neck:   No adenopathy, supple, trachea midline   Back:     No kyphosis present, no scoliosis present, no skin lesions,      erythema or scars, no  tenderness to percussion or                   palpation,  range of motion normal   Lungs:     Clear to auscultation,respirations regular, even and                  unlabored    Heart:    Regular rhythm and normal rate, normal S1 and S2, no            murmur, no gallop, no rub, no click   Chest Wall:    No abnormalities observed   Abdomen:    Soft large scar in the abdomen with open area in the middle aspect of it.   Rectal:     Deferred   Extremities:  There is some fullness in the upper left thigh of uncertain etiology.  There is no erythema or warmth to the tissue in that region.  There is been no instrumentation recently in that area.  She does have a right femoral central line   Pulses:   Pulses palpable and equal bilaterally   Skin:   No bleeding, bruising or rash   Lymph nodes:   No palpable adenopathy   Neurologic:   No focal deficits       Lab Results (last 72 hours)     Procedure Component Value Units Date/Time    Blood Culture - Blood, Arm, Left [[id number]] Collected: 04/16/22 0346    Specimen: Blood from Arm, Left Updated: 04/16/22 0406    CBC & Differential [[id number]]  (Abnormal) Collected: 04/16/22 0200    Specimen: Blood from Arm, Left Updated: 04/16/22 0223    Narrative:      The following orders were created for panel order CBC & Differential.  Procedure                               Abnormality         Status                     ---------                               -----------         ------                     CBC Auto Differential[[id number]]        Abnormal            Final result                 Please view results for these tests on the individual orders.    CBC Auto Differential [[id number]]  (Abnormal) Collected: 04/16/22 0200    Specimen: Blood from Arm, Left Updated: 04/16/22 0223     WBC 15.70 10*3/mm3      RBC 1.72 10*6/mm3      Hemoglobin 4.8 g/dL      Hematocrit 15.8 %      MCV 91.9 fL      MCH 27.9 pg      MCHC 30.4 g/dL      RDW 15.5 %      RDW-SD 51.4 fl      MPV 10.0 fL       Platelets 301 10*3/mm3      Neutrophil % 71.6 %      Lymphocyte % 16.8 %      Monocyte % 7.7 %      Eosinophil % 0.3 %      Basophil % 0.2 %      Immature Grans % 3.4 %      Neutrophils, Absolute 11.25 10*3/mm3      Lymphocytes, Absolute 2.63 10*3/mm3      Monocytes, Absolute 1.21 10*3/mm3      Eosinophils, Absolute 0.04 10*3/mm3      Basophils, Absolute 0.03 10*3/mm3      Immature Grans, Absolute 0.54 10*3/mm3      nRBC 0.4 /100 WBC     Comprehensive Metabolic Panel [[id number]]  (Abnormal) Collected: 04/16/22 0112    Specimen: Blood Updated: 04/16/22 0152     Glucose 195 mg/dL      BUN 17 mg/dL      Creatinine 2.25 mg/dL      Sodium 138 mmol/L      Potassium 3.6 mmol/L      Chloride 103 mmol/L      CO2 17.0 mmol/L      Calcium 7.7 mg/dL      Total Protein 4.3 g/dL      Albumin 2.00 g/dL      ALT (SGPT) 12 U/L      AST (SGOT) 23 U/L      Alkaline Phosphatase 70 U/L      Total Bilirubin 0.3 mg/dL      Globulin 2.3 gm/dL      A/G Ratio 0.9 g/dL      BUN/Creatinine Ratio 7.6     Anion Gap 18.0 mmol/L      eGFR 27.0 mL/min/1.73      Comment: National Kidney Foundation and American Society of Nephrology (ASN) Task Force recommended calculation based on the Chronic Kidney Disease Epidemiology Collaboration (CKD-EPI) equation refit without adjustment for race.       Narrative:      GFR Normal >60  Chronic Kidney Disease <60  Kidney Failure <15      POC Glucose Once [[id number]]  (Abnormal) Collected: 04/16/22 0139    Specimen: Blood Updated: 04/16/22 0151     Glucose 179 mg/dL      Comment: : [id number] Walsh SquirrogailMeter ID: [id number]       Blood Culture - Blood, Arm, Left [[id number]] Collected: 04/16/22 0112    Specimen: Blood from Arm, Left Updated: 04/16/22 0133    Comprehensive Metabolic Panel [[id number]]  (Abnormal) Collected: 04/15/22 2233    Specimen: Blood Updated: 04/15/22 2343     Glucose 137 mg/dL      BUN 16 mg/dL      Creatinine 2.15 mg/dL      Sodium 137 mmol/L      Potassium 3.6 mmol/L       Comment: Slight hemolysis detected by analyzer. Results may be affected.        Chloride 102 mmol/L      CO2 21.0 mmol/L      Calcium 7.9 mg/dL      Total Protein 4.6 g/dL      Albumin 2.00 g/dL      ALT (SGPT) 11 U/L      AST (SGOT) 22 U/L      Comment: Slight hemolysis detected by analyzer. Results may be affected.        Alkaline Phosphatase 71 U/L      Total Bilirubin 0.3 mg/dL      Globulin 2.6 gm/dL      A/G Ratio 0.8 g/dL      BUN/Creatinine Ratio 7.4     Anion Gap 14.0 mmol/L      eGFR 28.5 mL/min/1.73      Comment: National Kidney Foundation and American Society of Nephrology (ASN) Task Force recommended calculation based on the Chronic Kidney Disease Epidemiology Collaboration (CKD-EPI) equation refit without adjustment for race.       Narrative:      GFR Normal >60  Chronic Kidney Disease <60  Kidney Failure <15      Blood Culture With AASHISH - Blood, Arm, Right [[id number]]  (Normal) Collected: 04/14/22 1938    Specimen: Blood from Arm, Right Updated: 04/15/22 2015     Blood Culture No growth at 24 hours    Urine Culture - Urine, Urine, Catheter In/Out [[id number]]  (Abnormal) Collected: 04/14/22 1025    Specimen: Urine, Catheter In/Out Updated: 04/15/22 1311     Urine Culture >100,000 CFU/mL Gram Negative Bacilli    CBC & Differential [[id number]]  (Abnormal) Collected: 04/15/22 1150    Specimen: Blood Updated: 04/15/22 1207    Narrative:      The following orders were created for panel order CBC & Differential.  Procedure                               Abnormality         Status                     ---------                               -----------         ------                     CBC Auto Differential[[id number]]        Abnormal            Final result                 Please view results for these tests on the individual orders.    CBC Auto Differential [[id number]]  (Abnormal) Collected: 04/15/22 1150    Specimen: Blood Updated: 04/15/22 1207     WBC 15.69 10*3/mm3      RBC 2.56 10*6/mm3      Hemoglobin  7.2 g/dL      Hematocrit 21.4 %      MCV 83.6 fL      MCH 28.1 pg      MCHC 33.6 g/dL      RDW 15.2 %      RDW-SD 45.9 fl      MPV 10.0 fL      Platelets 347 10*3/mm3      Neutrophil % 71.2 %      Lymphocyte % 20.5 %      Monocyte % 6.1 %      Eosinophil % 0.1 %      Basophil % 0.3 %      Immature Grans % 1.8 %      Neutrophils, Absolute 11.17 10*3/mm3      Lymphocytes, Absolute 3.21 10*3/mm3      Monocytes, Absolute 0.96 10*3/mm3      Eosinophils, Absolute 0.01 10*3/mm3      Basophils, Absolute 0.05 10*3/mm3      Immature Grans, Absolute 0.29 10*3/mm3      nRBC 0.4 /100 WBC     Comprehensive Metabolic Panel [[id number]]  (Abnormal) Collected: 04/14/22 1116    Specimen: Blood Updated: 04/14/22 1232     Glucose 110 mg/dL      BUN 7 mg/dL      Creatinine 0.72 mg/dL      Sodium 137 mmol/L      Potassium 3.4 mmol/L      Comment: Slight hemolysis detected by analyzer. Results may be affected.        Chloride 99 mmol/L      CO2 25.0 mmol/L      Calcium 8.8 mg/dL      Total Protein 6.0 g/dL      Albumin 2.70 g/dL      ALT (SGPT) 13 U/L      AST (SGOT) 21 U/L      Comment: Slight hemolysis detected by analyzer. Results may be affected.        Alkaline Phosphatase 111 U/L      Total Bilirubin 0.5 mg/dL      Globulin 3.3 gm/dL      A/G Ratio 0.8 g/dL      BUN/Creatinine Ratio 9.7     Anion Gap 13.0 mmol/L      eGFR 105.9 mL/min/1.73      Comment: National Kidney Foundation and American Society of Nephrology (ASN) Task Force recommended calculation based on the Chronic Kidney Disease Epidemiology Collaboration (CKD-EPI) equation refit without adjustment for race.       Narrative:      GFR Normal >60  Chronic Kidney Disease <60  Kidney Failure <15      Protime-INR [[id number]]  (Abnormal) Collected: 04/14/22 1116    Specimen: Blood Updated: 04/14/22 1219     Protime 17.0 Seconds      INR 1.44    CBC & Differential [[id number]]  (Abnormal) Collected: 04/14/22 1116    Specimen: Blood Updated: 04/14/22 1213    Narrative:      The  following orders were created for panel order CBC & Differential.  Procedure                               Abnormality         Status                     ---------                               -----------         ------                     CBC Auto Differential[[id number]]        Abnormal            Final result                 Please view results for these tests on the individual orders.    CBC Auto Differential [[id number]]  (Abnormal) Collected: 04/14/22 1116    Specimen: Blood Updated: 04/14/22 1213     WBC 10.54 10*3/mm3      RBC 3.54 10*6/mm3      Hemoglobin 9.9 g/dL      Hematocrit 29.6 %      MCV 83.6 fL      MCH 28.0 pg      MCHC 33.4 g/dL      RDW 14.9 %      RDW-SD 45.7 fl      MPV 9.8 fL      Platelets 354 10*3/mm3      Neutrophil % 77.6 %      Lymphocyte % 14.7 %      Monocyte % 4.5 %      Eosinophil % 1.7 %      Basophil % 0.6 %      Immature Grans % 0.9 %      Neutrophils, Absolute 8.19 10*3/mm3      Lymphocytes, Absolute 1.55 10*3/mm3      Monocytes, Absolute 0.47 10*3/mm3      Eosinophils, Absolute 0.18 10*3/mm3      Basophils, Absolute 0.06 10*3/mm3      Immature Grans, Absolute 0.09 10*3/mm3      nRBC 0.2 /100 WBC     Urinalysis, Microscopic Only - Urine, Catheter In/Out [[id number]]  (Abnormal) Collected: 04/14/22 1025    Specimen: Urine, Catheter In/Out Updated: 04/14/22 1050     RBC, UA 0-2 /HPF      WBC, UA Too Numerous to Count /HPF      Bacteria, UA 4+ /HPF      Squamous Epithelial Cells, UA None Seen /HPF      Hyaline Casts, UA None Seen /LPF      Methodology Manual Light Microscopy    Urinalysis With Culture If Indicated - Urine, Catheter In/Out [[id number]]  (Abnormal) Collected: 04/14/22 1025    Specimen: Urine, Catheter In/Out Updated: 04/14/22 1040     Color, UA Yellow     Appearance, UA Cloudy     pH, UA >=9.0     Specific Gravity, UA 1.007     Glucose, UA Negative     Ketones, UA Trace     Bilirubin, UA Negative     Blood, UA Large (3+)     Protein, UA Trace     Leuk Esterase, UA Large  (3+)     Nitrite, UA Negative     Urobilinogen, UA 0.2 E.U./dL    CBC (No Diff) [[id number]]  (Abnormal) Collected: 04/13/22 1128    Specimen: Blood Updated: 04/13/22 1140     WBC 19.48 10*3/mm3      RBC 3.97 10*6/mm3      Hemoglobin 11.1 g/dL      Hematocrit 32.7 %      MCV 82.4 fL      MCH 28.0 pg      MCHC 33.9 g/dL      RDW 15.1 %      RDW-SD 45.1 fl      MPV 9.8 fL      Platelets 373 10*3/mm3         Imaging Results (Last 72 Hours)     Procedure Component Value Units Date/Time    CT Abdomen Pelvis With Contrast [[id number]] Collected: 04/15/22 2114     Updated: 04/15/22 2146    Addenda:        Filling defect in right lower lobe pulmonary arteries present on image 7  suspicious for pulmonary embolus     Attempts to call extension 2/3/2004 the liver masses were unsuccessful  This report was finalized on 04/15/2022 21:43 by Dr. Nadeem Marshall MD.  Signed: 04/15/22 2143 by Nadeem Marshall MD    Narrative:      EXAMINATION:   CT ABDOMEN PELVIS W CONTRAST-  4/15/2022 9:14 PM CDT     HISTORY: CT ABDOMEN AND PELVIS WITH CONTRAST 4/15/2022 8:50 PM CDT     HISTORY: Abdominal abscess     COMPARISON: April 7, 2022.      DLP: 1367 mGy cm     TECHNIQUE: Following the intravenous administration of contrast, helical  CT tomographic images of the abdomen and pelvis were acquired. Coronal  reformatted images were also provided for review.      FINDINGS:   In the right lung on axial image 7 of filling defect is present probably  a small pulmonary embolus..      LIVER: No focal liver lesion. The hepatic vasculature is patent.      BILIARY SYSTEM: The gallbladder is unremarkable. No intrahepatic or  extrahepatic ductal dilatation.      PANCREAS: No focal pancreatic lesion.      SPLEEN: Unremarkable.      KIDNEYS AND ADRENALS: Adrenal glands are visualized. Renal contours are  normal in size shape position. A left external percutaneous nephrostomy  internal nephroureterostomy tube is present. The distal tip is  satisfactorily  positioned in the bladder.. The ureters are decompressed  and normal in appearance.     RETROPERITONEUM: No mass, lymphadenopathy or hemorrhage.      GI TRACT: Air and fluid is present in the colon. Diarrhea may be  present.. The appendix is visualized and unremarkable.     OTHER: There is no mesenteric mass, lymphadenopathy or fluid collection.  The abdominopelvic vasculature is patent. The osseous structures and  soft tissues demonstrate no worrisome lesions.     PELVIS: A fat-containing left inguinal hernia is present. There is soft  tissue swelling possibly an abscess associated with the left abductor  katie muscle.. The bladder is collapsed around a Mojica catheter..       Impression:      1. Suspicious for abscess in the left abductor katie muscle.  2. Left external/internal percutaneous nephrostomy ureterostomy catheter  is satisfactorily position.  3. Fluid and air is present in the colon. This may be associated with  diarrhea.  4. Mojica catheter is present..         This report was finalized on 04/15/2022 21:24 by Dr. Nadeem Marshall MD.    XR Abdomen KUB [[id number]] Collected: 04/14/22 1804     Updated: 04/14/22 1811    Narrative:      XR ABDOMEN KUB- 4/14/2022 5:56 PM CDT     HISTORY: ng placement; E87.6-Hypokalemia; N39.0-Urinary tract infection,  site not specified; R31.9-Hematuria, unspecified; R11.2-Nausea with  vomiting, unspecified; E87.2-Acidosis       COMPARISON: April 14, 2022 at 4:33 PM     FINDINGS:  There is a nonspecific bowel gas pattern. Dobbhoff tube is present  coiled in the fundus the stomach satisfactorily position. A left-sided  nephrostomy catheter is present..     No acute skeletal abnormality is identified.        Impression:      1. Dobbhoff feeding tube satisfactorily position in the fundus of  stomach..         This report was finalized on 04/14/2022 18:08 by Dr. Nadeem Marshall MD.    XR Abdomen KUB [[id number]] Collected: 04/14/22 1650     Updated: 04/14/22 1654    Narrative:       SINGLE VIEW ABDOMEN        4/14/2022      HISTORY: ng placement; E87.6-Hypokalemia; N39.0-Urinary tract infection,  site not specified; R31.9-Hematuria, unspecified; R11.2-Nausea with  vomiting, unspecified; E87.2-Acidosis     FINDINGS: Weighted feeding tube with tip curled back into the gastric  fundus.     There is a left-sided internal/external nephrostomy tube. The lung bases  are clear. Moderate gastric distention. Large volume colonic stool with  gaseous distention of colon. No gross intraperitoneal free air.       Impression:      Feeding tube tip located in the gastric fundus.     This report was finalized on 04/14/2022 16:51 by Dr Rob Ribeiro, .    XR Abdomen KUB [[id number]] Collected: 04/14/22 1503     Updated: 04/14/22 1507    Narrative:      EXAMINATION: XR ABDOMEN KUB-     4/14/2022 2:39 PM CDT     HISTORY: NG placement; E87.6-Hypokalemia; N39.0-Urinary tract infection,  site not specified; R31.9-Hematuria, unspecified; R11.2-Nausea with  vomiting, unspecified; E87.2-Acidosis     Portable radiograph of the upper abdomen and lower chest.     A Dobbhoff feeding tube is now present and in good position with tip  extending to the distal stomach.     Diffuse dilation of bowel loops is again seen.     Left percutaneous nephrostomy/ureteral tube is again seen.     Summary:  1. Well-positioned Dobbhoff feeding tube.  This report was finalized on 04/14/2022 15:03 by Dr. Raúl Pan MD.    XR Abdomen KUB [[id number]] Collected: 04/14/22 1425     Updated: 04/14/22 1433    Narrative:      SINGLE VIEW ABDOMEN 4/14/2022      HISTORY: Dobhoff placement; E87.6-Hypokalemia; N39.0-Urinary tract  infection, site not specified; R31.9-Hematuria, unspecified;  R11.2-Nausea with vomiting, unspecified; E87.2-Acidosis     FINDINGS: Dobbhoff tube is nonvisualized. There is a left-sided  internal/external nephrostomy tube. The lung bases are clear. Moderate  gastric distention. Large volume colonic stool with gaseous  distention  of the colon as well, colonic distention measuring up to 7 cm diameter.  No gross intraperitoneal free air.       Impression:      Dobbhoff tube is nonvisualized and is either too shallow or  perhaps curled in the throat or upper esophagus. I would recommend  replacing and repeat abdominal film for confirmation.     Findings and recommendations were discussed with pt's nurse (3A) on  4/14/2022 at 2:29 PM CDT.  This report was finalized on 04/14/2022 14:30 by Dr Rob Ribeiro, .                Assessment/Plan       Intractable nausea and vomiting    Sepsis secondary to UTI (HCC)    Lactic acidosis    Hypokalemia    Essential (primary) hypertension    UTI (urinary tract infection), bacterial    Malfunction of nephrostomy tube (HCC)    Severe malnutrition (CMS/HCC)    Hypophosphatemia      Is a very complex situation.  Evaluating the CT scan, this should be a very difficult area for me to gain access to.  This may be hematoma as she has acute anemia probable blood loss.  I explained to Dr. Villarreal that best case scenario is that this patient is transferred back to the tertiary care center for treatment of her complex situation.  The going to get neurology to evaluate as well.      Tiffanie Scott MD  04/16/22  10:05 CDT

## 2022-04-16 NOTE — PLAN OF CARE
Goal Outcome Evaluation:      Pt to CCU from floor. Upon initial assessment pt nonverbal, but still following commands and moaning. Afebrile. Hypotensive, HgB 4.8. At approximately 0600, pt unresponsive, no longer following commands, eyes deviating, and L sided facial drooping, scarce tremors noted in extremities. Edgar CALLAWAY paged to the bedside to assess. Pt's BP became increasingly hypotensive. Levophed initiated. New labs ordered (see nursing note), NIHSS completed, no other interventions per Edgar CALLAWAY. Handoff of drips with bedside NIHSS and comprehensive neuro with Destin HOWE.

## 2022-04-17 ENCOUNTER — APPOINTMENT (OUTPATIENT)
Dept: GENERAL RADIOLOGY | Facility: HOSPITAL | Age: 47
End: 2022-04-17

## 2022-04-17 ENCOUNTER — APPOINTMENT (OUTPATIENT)
Dept: NEUROLOGY | Facility: HOSPITAL | Age: 47
End: 2022-04-17

## 2022-04-17 LAB
ALBUMIN SERPL-MCNC: 1.8 G/DL (ref 3.5–5.2)
ALBUMIN/GLOB SERPL: 0.8 G/DL
ALP SERPL-CCNC: 63 U/L (ref 39–117)
ALT SERPL W P-5'-P-CCNC: 15 U/L (ref 1–33)
ANION GAP SERPL CALCULATED.3IONS-SCNC: 11 MMOL/L (ref 5–15)
APPEARANCE CSF: ABNORMAL
APPEARANCE CSF: ABNORMAL
APTT PPP: 36.7 SECONDS (ref 24.1–35)
AST SERPL-CCNC: 28 U/L (ref 1–32)
BACTERIA SPEC AEROBE CULT: ABNORMAL
BASOPHILS # BLD AUTO: 0.03 10*3/MM3 (ref 0–0.2)
BASOPHILS NFR BLD AUTO: 0.2 % (ref 0–1.5)
BH BB BLOOD EXPIRATION DATE: NORMAL
BH BB BLOOD EXPIRATION DATE: NORMAL
BH BB BLOOD TYPE BARCODE: 6200
BH BB BLOOD TYPE BARCODE: 6200
BH BB DISPENSE STATUS: NORMAL
BH BB DISPENSE STATUS: NORMAL
BH BB PRODUCT CODE: NORMAL
BH BB PRODUCT CODE: NORMAL
BH BB UNIT NUMBER: NORMAL
BH BB UNIT NUMBER: NORMAL
BILIRUB SERPL-MCNC: 0.4 MG/DL (ref 0–1.2)
BUN SERPL-MCNC: 11 MG/DL (ref 6–20)
BUN/CREAT SERPL: 8.3 (ref 7–25)
CALCIUM SPEC-SCNC: 7.5 MG/DL (ref 8.6–10.5)
CHLORIDE SERPL-SCNC: 104 MMOL/L (ref 98–107)
CO2 SERPL-SCNC: 25 MMOL/L (ref 22–29)
COLOR CSF: ABNORMAL
COLOR CSF: ABNORMAL
COLOR SPUN CSF: COLORLESS
COLOR SPUN CSF: COLORLESS
CREAT SERPL-MCNC: 1.33 MG/DL (ref 0.57–1)
CROSSMATCH INTERPRETATION: NORMAL
CROSSMATCH INTERPRETATION: NORMAL
CRYPTOC AG CSF QL LA: NEGATIVE
DEPRECATED RDW RBC AUTO: 44.3 FL (ref 37–54)
DEPRECATED RDW RBC AUTO: 45.8 FL (ref 37–54)
EGFRCR SERPLBLD CKD-EPI 2021: 50.7 ML/MIN/1.73
EOSINOPHIL # BLD AUTO: 0.18 10*3/MM3 (ref 0–0.4)
EOSINOPHIL NFR BLD AUTO: 1.5 % (ref 0.3–6.2)
ERYTHROCYTE [DISTWIDTH] IN BLOOD BY AUTOMATED COUNT: 14.6 % (ref 12.3–15.4)
ERYTHROCYTE [DISTWIDTH] IN BLOOD BY AUTOMATED COUNT: 14.9 % (ref 12.3–15.4)
GLOBULIN UR ELPH-MCNC: 2.2 GM/DL
GLUCOSE CSF-MCNC: 65 MG/DL (ref 40–70)
GLUCOSE SERPL-MCNC: 126 MG/DL (ref 65–99)
HCT VFR BLD AUTO: 19.4 % (ref 34–46.6)
HCT VFR BLD AUTO: 25.1 % (ref 34–46.6)
HGB BLD-MCNC: 6.6 G/DL (ref 12–15.9)
HGB BLD-MCNC: 8.8 G/DL (ref 12–15.9)
IMM GRANULOCYTES # BLD AUTO: 0.43 10*3/MM3 (ref 0–0.05)
IMM GRANULOCYTES NFR BLD AUTO: 3.5 % (ref 0–0.5)
INR PPP: 1.37 (ref 0.91–1.09)
LYMPHOCYTES # BLD AUTO: 2.14 10*3/MM3 (ref 0.7–3.1)
LYMPHOCYTES NFR BLD AUTO: 17.6 % (ref 19.6–45.3)
MAGNESIUM SERPL-MCNC: 1.9 MG/DL (ref 1.6–2.6)
MCH RBC QN AUTO: 28.4 PG (ref 26.6–33)
MCH RBC QN AUTO: 29.7 PG (ref 26.6–33)
MCHC RBC AUTO-ENTMCNC: 34 G/DL (ref 31.5–35.7)
MCHC RBC AUTO-ENTMCNC: 35.1 G/DL (ref 31.5–35.7)
MCV RBC AUTO: 83.6 FL (ref 79–97)
MCV RBC AUTO: 84.8 FL (ref 79–97)
METHOD: ABNORMAL
METHOD: ABNORMAL
MONOCYTES # BLD AUTO: 1.28 10*3/MM3 (ref 0.1–0.9)
MONOCYTES NFR BLD AUTO: 10.5 % (ref 5–12)
NEUTROPHILS NFR BLD AUTO: 66.7 % (ref 42.7–76)
NEUTROPHILS NFR BLD AUTO: 8.08 10*3/MM3 (ref 1.7–7)
NRBC BLD AUTO-RTO: 0.4 /100 WBC (ref 0–0.2)
NUC CELL # CSF MANUAL: 1 /MM3 (ref 0–8)
NUC CELL # CSF MANUAL: 1 /MM3 (ref 0–8)
PHOSPHATE SERPL-MCNC: 3.1 MG/DL (ref 2.5–4.5)
PLATELET # BLD AUTO: 161 10*3/MM3 (ref 140–450)
PLATELET # BLD AUTO: 162 10*3/MM3 (ref 140–450)
PMV BLD AUTO: 9.6 FL (ref 6–12)
PMV BLD AUTO: 9.6 FL (ref 6–12)
POTASSIUM SERPL-SCNC: 3.8 MMOL/L (ref 3.5–5.2)
PROT CSF-MCNC: 34.5 MG/DL (ref 15–45)
PROT SERPL-MCNC: 4 G/DL (ref 6–8.5)
PROTHROMBIN TIME: 16.3 SECONDS (ref 11.9–14.6)
RBC # BLD AUTO: 2.32 10*6/MM3 (ref 3.77–5.28)
RBC # BLD AUTO: 2.96 10*6/MM3 (ref 3.77–5.28)
RBC # CSF MANUAL: 4 /MM3 (ref 0–0)
RBC # CSF MANUAL: 7 /MM3 (ref 0–0)
SODIUM SERPL-SCNC: 140 MMOL/L (ref 136–145)
SPECIMEN VOL CSF: 10 ML
SPECIMEN VOL CSF: 10 ML
TUBE # CSF: 1
TUBE # CSF: 4
UNIT  ABO: NORMAL
UNIT  ABO: NORMAL
UNIT  RH: NORMAL
UNIT  RH: NORMAL
WBC NRBC COR # BLD: 12.14 10*3/MM3 (ref 3.4–10.8)
WBC NRBC COR # BLD: 12.54 10*3/MM3 (ref 3.4–10.8)

## 2022-04-17 PROCEDURE — B01BZZZ FLUOROSCOPY OF SPINAL CORD: ICD-10-PCS | Performed by: RADIOLOGY

## 2022-04-17 PROCEDURE — 0 LIDOCAINE 1 % SOLUTION: Performed by: FAMILY MEDICINE

## 2022-04-17 PROCEDURE — 87116 MYCOBACTERIA CULTURE: CPT | Performed by: PSYCHIATRY & NEUROLOGY

## 2022-04-17 PROCEDURE — 25010000002 LEVETIRACETAM IN NACL 0.82% 500 MG/100ML SOLUTION: Performed by: PSYCHIATRY & NEUROLOGY

## 2022-04-17 PROCEDURE — 0 LEVETIRACETAM IN NACL 0.75% 1000 MG/100ML SOLUTION: Performed by: PSYCHIATRY & NEUROLOGY

## 2022-04-17 PROCEDURE — 0 POTASSIUM CHLORIDE 10 MEQ/100ML SOLUTION: Performed by: FAMILY MEDICINE

## 2022-04-17 PROCEDURE — 009U3ZX DRAINAGE OF SPINAL CANAL, PERCUTANEOUS APPROACH, DIAGNOSTIC: ICD-10-PCS | Performed by: RADIOLOGY

## 2022-04-17 PROCEDURE — 83735 ASSAY OF MAGNESIUM: CPT | Performed by: FAMILY MEDICINE

## 2022-04-17 PROCEDURE — 87070 CULTURE OTHR SPECIMN AEROBIC: CPT | Performed by: PSYCHIATRY & NEUROLOGY

## 2022-04-17 PROCEDURE — 87102 FUNGUS ISOLATION CULTURE: CPT | Performed by: PSYCHIATRY & NEUROLOGY

## 2022-04-17 PROCEDURE — 88108 CYTOPATH CONCENTRATE TECH: CPT | Performed by: PSYCHIATRY & NEUROLOGY

## 2022-04-17 PROCEDURE — C1751 CATH, INF, PER/CENT/MIDLINE: HCPCS

## 2022-04-17 PROCEDURE — 87015 SPECIMEN INFECT AGNT CONCNTJ: CPT | Performed by: PSYCHIATRY & NEUROLOGY

## 2022-04-17 PROCEDURE — 86900 BLOOD TYPING SEROLOGIC ABO: CPT

## 2022-04-17 PROCEDURE — 87205 SMEAR GRAM STAIN: CPT | Performed by: PSYCHIATRY & NEUROLOGY

## 2022-04-17 PROCEDURE — 87075 CULTR BACTERIA EXCEPT BLOOD: CPT | Performed by: PSYCHIATRY & NEUROLOGY

## 2022-04-17 PROCEDURE — 85610 PROTHROMBIN TIME: CPT | Performed by: PSYCHIATRY & NEUROLOGY

## 2022-04-17 PROCEDURE — 99291 CRITICAL CARE FIRST HOUR: CPT | Performed by: PSYCHIATRY & NEUROLOGY

## 2022-04-17 PROCEDURE — 86255 FLUORESCENT ANTIBODY SCREEN: CPT | Performed by: FAMILY MEDICINE

## 2022-04-17 PROCEDURE — P9016 RBC LEUKOCYTES REDUCED: HCPCS

## 2022-04-17 PROCEDURE — 85027 COMPLETE CBC AUTOMATED: CPT | Performed by: FAMILY MEDICINE

## 2022-04-17 PROCEDURE — 95819 EEG AWAKE AND ASLEEP: CPT

## 2022-04-17 PROCEDURE — 85730 THROMBOPLASTIN TIME PARTIAL: CPT | Performed by: PSYCHIATRY & NEUROLOGY

## 2022-04-17 PROCEDURE — 80053 COMPREHEN METABOLIC PANEL: CPT | Performed by: FAMILY MEDICINE

## 2022-04-17 PROCEDURE — 87327 CRYPTOCOCCUS NEOFORM AG IA: CPT | Performed by: PSYCHIATRY & NEUROLOGY

## 2022-04-17 PROCEDURE — 84100 ASSAY OF PHOSPHORUS: CPT | Performed by: FAMILY MEDICINE

## 2022-04-17 PROCEDURE — 95819 EEG AWAKE AND ASLEEP: CPT | Performed by: PSYCHIATRY & NEUROLOGY

## 2022-04-17 PROCEDURE — 87483 CNS DNA AMP PROBE TYPE 12-25: CPT | Performed by: PSYCHIATRY & NEUROLOGY

## 2022-04-17 PROCEDURE — 36430 TRANSFUSION BLD/BLD COMPNT: CPT

## 2022-04-17 PROCEDURE — 87206 SMEAR FLUORESCENT/ACID STAI: CPT | Performed by: PSYCHIATRY & NEUROLOGY

## 2022-04-17 PROCEDURE — 25010000002 ERTAPENEM PER 500 MG: Performed by: FAMILY MEDICINE

## 2022-04-17 PROCEDURE — 89050 BODY FLUID CELL COUNT: CPT | Performed by: PSYCHIATRY & NEUROLOGY

## 2022-04-17 PROCEDURE — 85025 COMPLETE CBC W/AUTO DIFF WBC: CPT | Performed by: FAMILY MEDICINE

## 2022-04-17 PROCEDURE — 82945 GLUCOSE OTHER FLUID: CPT | Performed by: PSYCHIATRY & NEUROLOGY

## 2022-04-17 PROCEDURE — 84157 ASSAY OF PROTEIN OTHER: CPT | Performed by: PSYCHIATRY & NEUROLOGY

## 2022-04-17 RX ORDER — SODIUM CHLORIDE, SODIUM LACTATE, POTASSIUM CHLORIDE, CALCIUM CHLORIDE 600; 310; 30; 20 MG/100ML; MG/100ML; MG/100ML; MG/100ML
75 INJECTION, SOLUTION INTRAVENOUS CONTINUOUS
Status: DISCONTINUED | OUTPATIENT
Start: 2022-04-17 | End: 2022-04-21 | Stop reason: HOSPADM

## 2022-04-17 RX ORDER — LIDOCAINE HYDROCHLORIDE 10 MG/ML
10 INJECTION, SOLUTION INFILTRATION; PERINEURAL ONCE
Status: COMPLETED | OUTPATIENT
Start: 2022-04-17 | End: 2022-04-17

## 2022-04-17 RX ORDER — LEVETIRACETAM 5 MG/ML
500 INJECTION INTRAVASCULAR EVERY 12 HOURS SCHEDULED
Status: DISCONTINUED | OUTPATIENT
Start: 2022-04-17 | End: 2022-04-21 | Stop reason: HOSPADM

## 2022-04-17 RX ORDER — LEVETIRACETAM 10 MG/ML
1000 INJECTION INTRAVASCULAR ONCE
Status: COMPLETED | OUTPATIENT
Start: 2022-04-17 | End: 2022-04-17

## 2022-04-17 RX ORDER — LIDOCAINE HYDROCHLORIDE 10 MG/ML
5 INJECTION, SOLUTION INFILTRATION; PERINEURAL ONCE
Status: COMPLETED | OUTPATIENT
Start: 2022-04-17 | End: 2022-04-17

## 2022-04-17 RX ORDER — MORPHINE SULFATE 2 MG/ML
2 INJECTION, SOLUTION INTRAMUSCULAR; INTRAVENOUS EVERY 4 HOURS PRN
Status: DISCONTINUED | OUTPATIENT
Start: 2022-04-17 | End: 2022-04-21 | Stop reason: HOSPADM

## 2022-04-17 RX ORDER — FAMOTIDINE 10 MG/ML
20 INJECTION, SOLUTION INTRAVENOUS EVERY 12 HOURS SCHEDULED
Status: DISCONTINUED | OUTPATIENT
Start: 2022-04-17 | End: 2022-04-21 | Stop reason: HOSPADM

## 2022-04-17 RX ADMIN — LIDOCAINE HYDROCHLORIDE 5 ML: 10 INJECTION, SOLUTION INFILTRATION; PERINEURAL at 13:10

## 2022-04-17 RX ADMIN — FAMOTIDINE 20 MG: 10 INJECTION INTRAVENOUS at 21:37

## 2022-04-17 RX ADMIN — NEOMYCIN SULFATE, POLYMYXIN B SULFATE AND HYDROCORTISONE 4 DROP: 10; 3.5; 1 SUSPENSION/ DROPS AURICULAR (OTIC) at 23:53

## 2022-04-17 RX ADMIN — SODIUM BICARBONATE 650 MG: 650 TABLET ORAL at 21:38

## 2022-04-17 RX ADMIN — NEOMYCIN SULFATE, POLYMYXIN B SULFATE AND HYDROCORTISONE 4 DROP: 10; 3.5; 1 SUSPENSION/ DROPS AURICULAR (OTIC) at 00:18

## 2022-04-17 RX ADMIN — FLUTICASONE PROPIONATE 2 SPRAY: 50 SPRAY, METERED NASAL at 21:39

## 2022-04-17 RX ADMIN — POTASSIUM CHLORIDE 10 MEQ: 7.46 INJECTION, SOLUTION INTRAVENOUS at 01:21

## 2022-04-17 RX ADMIN — SODIUM CHLORIDE, POTASSIUM CHLORIDE, SODIUM LACTATE AND CALCIUM CHLORIDE 100 ML/HR: 600; 310; 30; 20 INJECTION, SOLUTION INTRAVENOUS at 10:59

## 2022-04-17 RX ADMIN — FAMOTIDINE 20 MG: 10 INJECTION INTRAVENOUS at 08:26

## 2022-04-17 RX ADMIN — Medication 10 ML: at 21:40

## 2022-04-17 RX ADMIN — LEVETIRACETAM 1000 MG: 10 INJECTION INTRAVENOUS at 14:53

## 2022-04-17 RX ADMIN — Medication 10 ML: at 08:26

## 2022-04-17 RX ADMIN — NEOMYCIN SULFATE, POLYMYXIN B SULFATE AND HYDROCORTISONE 4 DROP: 10; 3.5; 1 SUSPENSION/ DROPS AURICULAR (OTIC) at 14:22

## 2022-04-17 RX ADMIN — SODIUM BICARBONATE 100 MEQ: 84 INJECTION INTRAVENOUS at 08:44

## 2022-04-17 RX ADMIN — NEOMYCIN SULFATE, POLYMYXIN B SULFATE AND HYDROCORTISONE 4 DROP: 10; 3.5; 1 SUSPENSION/ DROPS AURICULAR (OTIC) at 18:25

## 2022-04-17 RX ADMIN — Medication 2 SPRAY: at 21:39

## 2022-04-17 RX ADMIN — SODIUM BICARBONATE 650 MG: 650 TABLET ORAL at 08:26

## 2022-04-17 RX ADMIN — Medication 2 SPRAY: at 08:26

## 2022-04-17 RX ADMIN — NEOMYCIN SULFATE, POLYMYXIN B SULFATE AND HYDROCORTISONE 4 DROP: 10; 3.5; 1 SUSPENSION/ DROPS AURICULAR (OTIC) at 06:00

## 2022-04-17 RX ADMIN — LEVETIRACETAM 500 MG: 5 INJECTION INTRAVASCULAR at 21:37

## 2022-04-17 RX ADMIN — POTASSIUM CHLORIDE 10 MEQ: 7.46 INJECTION, SOLUTION INTRAVENOUS at 00:18

## 2022-04-17 RX ADMIN — FOLIC ACID 1 MG: 1 TABLET ORAL at 21:38

## 2022-04-17 RX ADMIN — LIDOCAINE HYDROCHLORIDE 10 ML: 10 INJECTION, SOLUTION INFILTRATION; PERINEURAL at 12:41

## 2022-04-17 RX ADMIN — ERTAPENEM SODIUM 1 G: 1 INJECTION, POWDER, LYOPHILIZED, FOR SOLUTION INTRAMUSCULAR; INTRAVENOUS at 16:10

## 2022-04-17 RX ADMIN — FLUOXETINE HYDROCHLORIDE 20 MG: 20 CAPSULE ORAL at 21:38

## 2022-04-17 RX ADMIN — FLUTICASONE PROPIONATE 2 SPRAY: 50 SPRAY, METERED NASAL at 08:26

## 2022-04-17 NOTE — PROGRESS NOTES
Discussed with family at bedside and Dr. Villarreal.  Leg still has the swelling without erythema or warmth to the touch in the left groin area and inner thigh.  I question whether this represents hematoma and possible spontaneous bleeding as she has had in the past with anticoagulation versus possible abscess.  Possible encephalitis being worked up.  We did discuss further imaging of the leg but undergoing neurologic testing today so maybe tomorrow.  I explained to the  at the bedside that this would be a very difficult area for me as a general surgeon to surgically address and may best be at the tertiary care center.  She is had a very complex and complicated prolonged illness and been treated in the past at [facility].  Primary care team trying to effect transfer back

## 2022-04-17 NOTE — CONSULTS
Unsuccessful attempts to obtain PICC line; x 3.  Notified primary RN to notify MD of unsuccessful attempts.  Very difficult access - would recommend jugular or subclavian CVAD placement if larger access is still warranted.

## 2022-04-17 NOTE — PROGRESS NOTES
Infectious Diseases Progress Note    Patient:  Namita Zabala  YOB: 1975  MRN: [id number]   Admit date: 4/2/2022   Admitting Physician: Amado Villarreal MD  Primary Care Physician: David Lara MD    Chief Complaint/Interval History: Saw her in radiology while she was getting LP.  She was more verbal and responsive than yesterday.  Nursing indicates she is moving all extremities.  Fluoroscopic guided LP was successful.  Clear fluid obtained.  She is without fever.  She is hemodynamically stable. MRI obtained yesterday suggested the possibility of encephalitis.  Would seem atypical to have encephalitis without significant fever.    Intake/Output Summary (Last 24 hours) at 4/17/2022 1213  Last data filed at 4/17/2022 1132  Gross per 24 hour   Intake 3299.21 ml   Output 1775 ml   Net 1524.21 ml     Allergies:   Allergies   Allergen Reactions   • Coconut Unknown - High Severity   • Nuts Unknown - High Severity   • Penicillins    • [place] Other (See Comments)     Causes migraines per pt reports     Current Scheduled Medications:   ertapenem, 1 g, Intravenous, Q24H  famotidine, 20 mg, Intravenous, Daily  FLUoxetine, 20 mg, Oral, Nightly  fluticasone, 2 spray, Each Nare, BID  folic acid, 1 mg, Oral, Daily  neomycin-polymyxin-hydrocortisone, 4 drop, Both Ears, Q6H  oxymetazoline, 2 spray, Nasal, BID  sodium bicarbonate, 650 mg, Oral, BID  sodium chloride, 10 mL, Intravenous, Q12H      Current PRN Medications:  hold  •  hydrALAZINE  •  labetalol  •  ondansetron **OR** ondansetron  •  potassium chloride **OR** potassium chloride **OR** potassium chloride  •  potassium phosphate infusion greater than 15 mMoles **OR** potassium phosphate infusion greater than 15 mMoles **OR** potassium phosphate **OR** sodium phosphate IVPB **OR** sodium phosphate IVPB **OR** sodium phosphate IVPB  •  sodium chloride  •  [COMPLETED] Insert peripheral IV **AND** sodium chloride  •  [COMPLETED] Insert peripheral IV  "**AND** sodium chloride  •  sodium chloride    Review of Systems Unobtainable from patient due to sedation and mechanical ventilation    Vital Signs:  BP 95/66   Pulse 102   Temp 98.2 °F (36.8 °C) (Axillary)   Resp 14   Ht 170.2 cm (67\")   Wt 70.7 kg (155 lb 12.8 oz)   SpO2 100%   BMI 24.40 kg/m²     Physical Exam  Vital signs - reviewed.  Line/IV site - No erythema, warmth, induration, or tenderness.  Lungs without crackles  Skin without rash  When I saw her in radiology she was supine for lumbar puncture.  Per review of notes she has had some swelling in the left groin area consistent with possible hematoma.  Lab Results:  CBC:   Results from last 7 days   Lab Units 04/17/22  0732 04/16/22  1511 04/16/22  0200 04/15/22  1150 04/14/22  1116 04/13/22  1128 04/12/22  0622 04/11/22  0752   WBC 10*3/mm3 12.14*  --  15.70* 15.69* 10.54 19.48* 8.15 8.88   HEMOGLOBIN g/dL 6.6* 9.0* 4.8* 7.2* 9.9* 11.1* 10.1* 9.7*   HEMATOCRIT % 19.4* 26.3* 15.8* 21.4* 29.6* 32.7* 30.9* 28.5*   PLATELETS 10*3/mm3 161  --  301 347 354 373 287 256     BMP:  Results from last 7 days   Lab Units 04/17/22  0357 04/16/22  1511 04/16/22  0112 04/15/22  2233 04/14/22  1116 04/13/22  0725 04/12/22  0622 04/11/22  0752   SODIUM mmol/L 140 143 138 137 137 134* 141 139   POTASSIUM mmol/L 3.8 3.1* 3.6 3.6 3.4* 3.8 3.9 3.3*   CHLORIDE mmol/L 104 108* 103 102 99 99 105 106   CO2 mmol/L 25.0 24.0 17.0* 21.0* 25.0 18.0* 24.0 23.0   BUN mg/dL 11 14 17 16 7 8 7 7   CREATININE mg/dL 1.33* 1.77* 2.25* 2.15* 0.72 0.70 0.71 0.70   GLUCOSE mg/dL 126* 138* 195* 137* 110* 83 79 102*   CALCIUM mg/dL 7.5* 7.8* 7.7* 7.9* 8.8 8.9 8.6 8.3*   ALT (SGPT) U/L 15  --  12 11 13  --   --  9     CSF protein 35  CSF red blood cell count 7  CSF nucleated cell count 1  Cryptococcal antigen negative    Culture Results:   Blood Culture   Date Value Ref Range Status   04/16/2022 No growth at 24 hours  Preliminary   04/16/2022 No growth at 24 hours  Preliminary   04/14/2022 No " growth at 2 days  Preliminary     Urine Culture   Date Value Ref Range Status   04/14/2022 >100,000 CFU/mL Klebsiella oxytoca ESBL (A)  Final     Comment:     Consider infectious disease consult.  Susceptibility results may not correlate to clinical outcomes.     Radiology:     MRI of brain and brainstem done April 16, 2022:  IMPRESSION:  Impression:   Abnormal signal with restricted diffusion in the bilateral occipital and  also subcortical bilateral parietal cortex. Most likely this is an  encephalitis.   This report was finalized on 04/16/2022 18:47 by Dr. Nadeem Marshall MD.  (Personally reviewed above MRI with radiology.  Feel differential diagnosis larger than just encephalitis.  Per discussion with radiology, ischemia, encephalitis, prion disease, could all present with above findings)    Additional Studies Reviewed: None    Impression:   1. Change in mental status.  MRI may be consistent with ischemia.  Hard to tell for sure.  She had had hypotension yesterday morning and also had had a drop in hemoglobin.  Possible ischemic injury from hypotension.  2.  She had had intractable nausea vomiting-nursing indicates she had some emesis when placed supine for LP, did not report emesis prior to that time.  She had been on NG to intermittent suction.  3.  Drop in hemoglobin yesterday-Per review of notes may have had spontaneous bleeding possibly related to anticoagulation.  4.  UTI secondary to ESBL positive Klebsiella-on treatment with ertapenem  5.  Poss blood culture-likely contaminant  6.  Acute renal dysfunction-improving    Recommendations:   Continue ertapenem  Continue off vancomycin  CSF parameters not suggestive of encephalitis or other CNS inflammatory process-do not feel antimicrobial coverage needs to be expanded  No new recommendations from ID at present    Vijay Russell MD

## 2022-04-17 NOTE — PROGRESS NOTES
Neurology Progress Note      Date of admission: 4/2/2022  8:46 PM  Date of visit: 4/17/2022    Chief Complaint:  F/u AMS    Subjective     Subjective:  Remains unresponsive nonverbal except moaning at times and will withdraw from noxious stimuli  Does not respond to visual threat      Hemoglobin only 6.6 but renal functions are improving  Medications:  Current Facility-Administered Medications   Medication Dose Route Frequency Provider Last Rate Last Admin   • ertapenem (INVanz) 1 g in sodium chloride 0.9 % 100 mL IVPB-VTB  1 g Intravenous Q24H Amado Villarreal  mL/hr at 04/16/22 1859 1 g at 04/16/22 1859   • famotidine (PEPCID) injection 20 mg  20 mg Intravenous Daily Amado Villarreal MD   20 mg at 04/17/22 0826   • FLUoxetine (PROzac) capsule 20 mg  20 mg Oral Nightly Amado Villarreal MD   20 mg at 04/14/22 2200   • fluticasone (FLONASE) 50 MCG/ACT nasal spray 2 spray  2 spray Each Nare BID Frank Ricks MD   2 spray at 04/17/22 0826   • folic acid (FOLVITE) tablet 1 mg  1 mg Oral Daily Amado Villarreal MD   1 mg at 04/14/22 2200   • hydrALAZINE (APRESOLINE) injection 10 mg  10 mg Intravenous Q4H PRN Amado Villarreal MD       • labetalol (NORMODYNE,TRANDATE) injection 10 mg  10 mg Intravenous Q4H PRN Amado Villarreal MD   10 mg at 04/10/22 1518   • lactated ringers infusion  100 mL/hr Intravenous Continuous Amado Villarreal MD       • neomycin-polymyxin-hydrocortisone (CORTISPORIN) otic suspension 4 drop  4 drop Both Ears Q6H Fermin Mcclure DO   4 drop at 04/17/22 0600   • norepinephrine (LEVOPHED) 8 mg in 250 mL NS infusion (premix)  0.02-0.3 mcg/kg/min Intravenous Titrated Rajwinder Amaya APRN 4.91 mL/hr at 04/16/22 0645 0.04 mcg/kg/min at 04/16/22 0645   • ondansetron (ZOFRAN) tablet 4 mg  4 mg Oral Q6H PRN Frank Ricks MD        Or   • ondansetron (ZOFRAN) injection 4 mg  4 mg Intravenous Q6H PRN Frank Ricks MD   4 mg at 04/16/22 1000   • oxymetazoline (AFRIN) nasal spray 2 spray  2  spray Nasal BID Frank Ricks MD   2 spray at 04/17/22 0826   • potassium chloride (MICRO-K) CR capsule 40 mEq  40 mEq Oral PRN Amado Villarreal MD        Or   • potassium chloride (KLOR-CON) packet 40 mEq  40 mEq Oral PRN Amado Villarreal MD        Or   • potassium chloride 10 mEq in 100 mL IVPB  10 mEq Intravenous Q1H PRN Amado Villarreal  mL/hr at 04/17/22 0121 10 mEq at 04/17/22 0121   • potassium phosphate 45 mmol in sodium chloride 0.9 % 500 mL infusion  45 mmol Intravenous PRN Amado Villarreal MD        Or   • potassium phosphate 30 mmol in sodium chloride 0.9 % 250 mL infusion  30 mmol Intravenous PRN Amado Villarreal MD 31.3 mL/hr at 04/16/22 1900 30 mmol at 04/16/22 1900    Or   • Potassium Phosphates 15 mmol in sodium chloride 0.9 % 100 mL infusion  15 mmol Intravenous PRN Amado Villarreal MD        Or   • sodium phosphates 45 mmol in sodium chloride 0.9 % 500 mL IVPB  45 mmol Intravenous PRN Amado Villarreal MD        Or   • sodium phosphates 30 mmol in sodium chloride 0.9 % 250 mL IVPB  30 mmol Intravenous PRN Amado Villarreal MD        Or   • sodium phosphates 15 mmol in sodium chloride 0.9 % 250 mL IVPB  15 mmol Intravenous PRN Amado Villarreal MD       • sodium bicarbonate tablet 650 mg  650 mg Oral BID Amado Villarreal MD   650 mg at 04/17/22 0826   • sodium chloride 0.9 % flush 10 mL  10 mL Intravenous PRN Frank Ricks MD       • sodium chloride 0.9 % flush 10 mL  10 mL Intravenous PRN Frank Ricks MD       • sodium chloride 0.9 % flush 10 mL  10 mL Intravenous PRN Frank Ricks MD       • sodium chloride 0.9 % flush 10 mL  10 mL Intravenous Q12H Frank Ricks MD   10 mL at 04/17/22 0826   • sodium chloride 0.9 % flush 10 mL  10 mL Intravenous PRN Frank Ricks MD   10 mL at 04/10/22 1518   • SUMAtriptan (IMITREX) tablet 50 mg  50 mg Oral Once Frank Ricks MD           Review of Systems:   -A 14 point review of systems is unobtainable    Objective     Objective       Vital Signs  Temp:  [97.3 °F (36.3 °C)-98.4 °F (36.9 °C)] 98.4 °F (36.9 °C)  Heart Rate:  [] 96  Resp:  [14] 14  BP: ()/() 99/64    Physical Exam:    HEENT:  Neck supple  CVS:  Regular rate and rhythm.  No murmurs  Carotid Examination:  No bruits  Lungs:  Clear to auscultation  Abdomen:  Non-tender, Non-distended  Extremities:  No signs of peripheral edema    Neurologic Exam:    -opens eyes Will moan. Can grimace unable to communicate      Cranial nerves II through XII intact.  Pupils react  No response to visual threat  Eyes conjugate but only in ortho position  EEG ongoing so I'm not wanting to manually turn her head  No facial motor asymmetry but not moving lower face much  Does hear as will open her eyes to her name called    Motor: (strength out of 5:  1= minimal movement, 2 = movement in plane of gravity, 3 = movement against gravity, 4 = movement against some resistance, 5 = full strength)    Increased tone in all 4 extremities    DTR:  3+ throughout in upper extremities  Absent in lower extremities and plantar responses are mute    Sensory:  Unable to assess    Coordination/Gait:  -Not enough movement     Results Review:    I reviewed the patient's new clinical results.    Lab Results (last 24 hours)     Procedure Component Value Units Date/Time    CBC & Differential [[id number]]  (Abnormal) Collected: 04/17/22 0732    Specimen: Blood Updated: 04/17/22 0813    Narrative:      The following orders were created for panel order CBC & Differential.  Procedure                               Abnormality         Status                     ---------                               -----------         ------                     CBC Auto Differential[[id number]]        Abnormal            Final result                 Please view results for these tests on the individual orders.    CBC Auto Differential [[id number]]  (Abnormal) Collected: 04/17/22 0732    Specimen: Blood Updated: 04/17/22 0813     WBC  12.14 10*3/mm3      RBC 2.32 10*6/mm3      Hemoglobin 6.6 g/dL      Hematocrit 19.4 %      MCV 83.6 fL      MCH 28.4 pg      MCHC 34.0 g/dL      RDW 14.6 %      RDW-SD 44.3 fl      MPV 9.6 fL      Platelets 161 10*3/mm3      Neutrophil % 66.7 %      Lymphocyte % 17.6 %      Monocyte % 10.5 %      Eosinophil % 1.5 %      Basophil % 0.2 %      Immature Grans % 3.5 %      Neutrophils, Absolute 8.08 10*3/mm3      Lymphocytes, Absolute 2.14 10*3/mm3      Monocytes, Absolute 1.28 10*3/mm3      Eosinophils, Absolute 0.18 10*3/mm3      Basophils, Absolute 0.03 10*3/mm3      Immature Grans, Absolute 0.43 10*3/mm3      nRBC 0.4 /100 WBC     Narrative:      ckd with  Recollect     Phosphorus [[id number]]  (Normal) Collected: 04/17/22 0357    Specimen: Blood Updated: 04/17/22 0425     Phosphorus 3.1 mg/dL     Magnesium [[id number]]  (Normal) Collected: 04/17/22 0357    Specimen: Blood Updated: 04/17/22 0425     Magnesium 1.9 mg/dL     Comprehensive Metabolic Panel [[id number]]  (Abnormal) Collected: 04/17/22 0357    Specimen: Blood Updated: 04/17/22 0424     Glucose 126 mg/dL      BUN 11 mg/dL      Creatinine 1.33 mg/dL      Sodium 140 mmol/L      Potassium 3.8 mmol/L      Chloride 104 mmol/L      CO2 25.0 mmol/L      Calcium 7.5 mg/dL      Total Protein 4.0 g/dL      Albumin 1.80 g/dL      ALT (SGPT) 15 U/L      AST (SGOT) 28 U/L      Alkaline Phosphatase 63 U/L      Total Bilirubin 0.4 mg/dL      Globulin 2.2 gm/dL      A/G Ratio 0.8 g/dL      BUN/Creatinine Ratio 8.3     Anion Gap 11.0 mmol/L      eGFR 50.7 mL/min/1.73      Comment: National Kidney Foundation and American Society of Nephrology (ASN) Task Force recommended calculation based on the Chronic Kidney Disease Epidemiology Collaboration (CKD-EPI) equation refit without adjustment for race.       Narrative:      GFR Normal >60  Chronic Kidney Disease <60  Kidney Failure <15      Blood Culture - Blood, Arm, Left [[id number]]  (Normal) Collected: 04/16/22 0346    Specimen:  Blood from Arm, Left Updated: 04/17/22 0417     Blood Culture No growth at 24 hours    Protime-INR [[id number]]  (Abnormal) Collected: 04/17/22 0357    Specimen: Blood Updated: 04/17/22 0416     Protime 16.3 Seconds      INR 1.37    aPTT [[id number]]  (Abnormal) Collected: 04/17/22 0357    Specimen: Blood Updated: 04/17/22 0416     PTT 36.7 seconds     Blood Culture - Blood, Arm, Left [[id number]]  (Normal) Collected: 04/16/22 0112    Specimen: Blood from Arm, Left Updated: 04/17/22 0147     Blood Culture No growth at 24 hours    Blood Culture With AASHISH - Blood, Arm, Right [[id number]]  (Normal) Collected: 04/14/22 1938    Specimen: Blood from Arm, Right Updated: 04/16/22 2017     Blood Culture No growth at 2 days    Phosphorus [[id number]]  (Abnormal) Collected: 04/16/22 1511    Specimen: Blood Updated: 04/16/22 1554     Phosphorus 1.4 mg/dL     Basic Metabolic Panel [[id number]]  (Abnormal) Collected: 04/16/22 1511    Specimen: Blood Updated: 04/16/22 1543     Glucose 138 mg/dL      BUN 14 mg/dL      Creatinine 1.77 mg/dL      Sodium 143 mmol/L      Potassium 3.1 mmol/L      Chloride 108 mmol/L      CO2 24.0 mmol/L      Calcium 7.8 mg/dL      BUN/Creatinine Ratio 7.9     Anion Gap 11.0 mmol/L      eGFR 36.0 mL/min/1.73      Comment: National Kidney Foundation and American Society of Nephrology (ASN) Task Force recommended calculation based on the Chronic Kidney Disease Epidemiology Collaboration (CKD-EPI) equation refit without adjustment for race.       Narrative:      GFR Normal >60  Chronic Kidney Disease <60  Kidney Failure <15      Magnesium [[id number]]  (Abnormal) Collected: 04/16/22 1511    Specimen: Blood Updated: 04/16/22 1537     Magnesium 1.5 mg/dL     Hemoglobin & Hematocrit, Blood [[id number]]  (Abnormal) Collected: 04/16/22 1511    Specimen: Blood Updated: 04/16/22 1524     Hemoglobin 9.0 g/dL      Hematocrit 26.3 %         Imaging Results (Last 24 Hours)     Procedure Component Value Units Date/Time    MRI  Brain Without Contrast [[id number]] Collected: 04/16/22 1842     Updated: 04/16/22 1850    Narrative:      EXAMINATION:   MRI BRAIN WO CONTRAST-  4/16/2022 6:42 PM CDT     HISTORY: MRI brain without contrast 4/16/2022 6:00 PM CDT     History: Unresponsive staring acute neuro change; E87.6-Hypokalemia;  N39.0-Urinary tract infection, site not specified; R31.9-Hematuria,  unspecified; R11.2-Nausea with vomiting, unspecified; E87.2-Acidosis;  R13.10-Dysphagia, unspecified; A41.9-Sepsis, unspecified organism;  N39.0-Urinary tract infection, site not specified     Comparison: None.       Technique: Multiplanar imaging of the brain was performed in a routine  fashion.      Findings:   Midline structures are nondisplaced. Ventricular system is normal. There  is no significant mass effect or hydrocephalus. Basilar cisterns are  preserved. Abnormal restricted diffusion is noted bilaterally in the  occipital lobes.. No abnormal extra axial fluid collections are noted.  Restricted diffusion is noted which is subcortical in the bilateral  parietal cortex. This may be an encephalitis. The FLAIR sequence is not  as intense as the restricted diffusion.     Mild cortical volume loss is noted..      Proximal cervical spinal cord, brainstem, and cerebellum are  unremarkable. Normal cerebrovascular flow voids are seen. Bilateral  globes and orbits are normal in appearance.     No abnormal signal is noted in the mastoid air cells or paranasal  sinuses.        Impression:      Impression:   Abnormal signal with restricted diffusion in the bilateral occipital and  also subcortical bilateral parietal cortex. Most likely this is an  encephalitis.         This report was finalized on 04/16/2022 18:47 by Dr. Nadeem Marshall MD.    CT Head Without Contrast [[id number]] Collected: 04/16/22 1150     Updated: 04/16/22 1156    Narrative:      EXAM/TECHNIQUE: CT HEAD WO CONTRAST-     INDICATION: Mental status change, generalized weakness, dizziness      COMPARISON: 04/11/2022     DLP: 643 mGy cm. Automated exposure control was also utilized to  decrease patient radiation dose.     FINDINGS:     No evidence of acute intracranial hemorrhage. No loss of gray-white  differentiation. No midline shift or mass effect. Lateral ventricles are  nondilated. Basilar cisterns are patent. No acute orbital finding.  Mastoid air cells are clear. Visualized paranasal sinuses are clear.  Partially imaged nasogastric tube. No acute osseous finding.       Impression:         No acute intracranial findings.  This report was finalized on 04/16/2022 11:53 by Dr. Gomez Cárdenas MD.    XR Abdomen KUB [[id number]] Collected: 04/16/22 1100     Updated: 04/16/22 1105    Narrative:      EXAM: XR ABDOMEN KUB-     INDICATION: NG placement; E87.6-Hypokalemia; N39.0-Urinary tract  infection, site not specified; R31.9-Hematuria, unspecified;  R11.2-Nausea with vomiting, unspecified; E87.2-Acidosis;  R13.10-Dysphagia, unspecified; A41.9-Sepsis, unspecified organism;  N39.0-Urinary tract infection, site not specified     COMPARISON: None available.     FINDINGS:     Nasogastric tube tip projects over the expected location of the gastric  fundus. A left-sided nephrostomy tube/ureteral stent is in place. There  is marked gaseous distention of the stomach, small bowel, and colon.  There is a transverse colon measures up to 8 mm in diameter. Small bowel  loops measure up to 3.37 m in diameter. A RIGHT femoral venous line is  noted.       Impression:         1.  Enteric tube projects over the proximal stomach.  2.  Marked distention of the stomach, small bowel, and colon. Favor  ileus.  This report was finalized on 04/16/2022 11:02 by Dr. Gomez Cárdenas MD.        MRI of brain was done without contrast due to renal functions (tech would not do) This shows mostly abnormalities on DWI which could be seen from seizure induced cytotoxic injury, , hypoglycemia, hyperammonemia, hypoxic injury  To my knowledge  she has not had any of these unless an unwitnessed seizure or subclinical seizures   Assessment/Plan     Hospital Problem List      Intractable nausea and vomiting    Sepsis secondary to UTI (HCC)    Lactic acidosis    Hypokalemia    Essential (primary) hypertension    UTI (urinary tract infection), bacterial    Malfunction of nephrostomy tube (HCC)    Severe malnutrition (CMS/HCC)    Hypophosphatemia    Impression:  1. Unresponsive and with that abnormality on MRI--Cannot exclude subclinical seizures as nursing did not report any seizure like activity  Currently she has marked increased tone but not jerking  2. Anemia--Xarelto helddue to reoccurrence and uncler etiology? Hematoma left groin/thigh  3. Renal failure, improving    Plan:  · EEG stat  · LP today STAT  · Empiric trial of Keppra IV after EEG  · Consider MRI with contrast but wait--would not do today as she will have an LP and we may not need.       45 minutes of critical care time was performed ,during this time I consulted with the nursing staff and also with other providers in regard to the patient's care. I talked with the family about test results,in addition to the radiologist and Dr Villarreal  I examined the patient and looked at ongoing EEG which just showed diffuse slowing based on rapid interpretation of the ongoing EEG.  Full final report to follow when EEG completed      Abiola Titus MD  04/17/22  09:35 CDT

## 2022-04-17 NOTE — PROGRESS NOTES
[facility] Medicine Services  INPATIENT PROGRESS NOTE    Length of Stay: 13  Date of Admission: 4/2/2022  Primary Care Physician: David Lara MD    Subjective   Chief Complaint: Failure to thrive/metabolic acidosis/encephalitis    HPI   MRI result discussed with , plan for EEG and a spinal tap today.  Hemoglobin drop, no sign of acute bleed, plan for another unit of blood today.  I believe may be the blood was drawn too early before the blood gets is distributed from 2 units of blood yesterday.  Right thigh abscess, not any bigger.  Blood pressure stable, low . metabolic acidosis, CO2 is normal, DC bicarb changed to LR.    Review of Systems   Patient is arousable, refusing to talk.  No sign of acute distress.    All pertinent negatives and positives are as above. All other systems have been reviewed and are negative unless otherwise stated.     Objective    Temp:  [97.3 °F (36.3 °C)-98.4 °F (36.9 °C)] 97.3 °F (36.3 °C)  Heart Rate:  [] 101  Resp:  [13-18] 13  BP: ()/() 88/64    Intake/Output Summary (Last 24 hours) at 4/17/2022 0832  Last data filed at 4/17/2022 0300  Gross per 24 hour   Intake 3454.63 ml   Output 1375 ml   Net 2079.63 ml     Physical Exam  Constitutional:       Appearance: She is well-developed.   HENT:      Head: Normocephalic.   Eyes:      Conjunctiva/sclera: Conjunctivae normal.      Pupils: Pupils are equal, round, and reactive to light.   Neck:      Vascular: No JVD.   Cardiovascular:      Rate and Rhythm: Regular rhythm.  Rates in the 100 .     Heart sounds: Normal heart sounds. No murmur heard.    No friction rub. No gallop.   Pulmonary:      Effort: No respiratory distress.      Breath sounds: No wheezing or rales.      Comments: Diminished breath sound bilateral, clear .  Chest:      Chest wall: No tenderness.   Abdominal:      General: Bowel sounds are normal. There is no distension.      Palpations: Abdomen is soft.       Tenderness: There is no abdominal tenderness. There is no guarding or rebound.   Musculoskeletal:         General: No tenderness or deformity.      Cervical back: Neck supple.   Skin:     General: Skin is warm and dry.      Capillary Refill: Capillary refill takes 2 to 3 seconds.      Findings: No rash.   Neurological:      Cranial Nerves: No cranial nerve deficit.      Motor: Weakness present. No abnormal muscle tone.      Coordination: Coordination abnormal.      Gait: Gait abnormal.      Deep Tendon Reflexes: Reflexes normal.   Results Review:  Lab Results (last 24 hours)     Procedure Component Value Units Date/Time    CBC & Differential [[id number]]  (Abnormal) Collected: 04/17/22 0732    Specimen: Blood Updated: 04/17/22 0813    Narrative:      The following orders were created for panel order CBC & Differential.  Procedure                               Abnormality         Status                     ---------                               -----------         ------                     CBC Auto Differential[[id number]]        Abnormal            Final result                 Please view results for these tests on the individual orders.    CBC Auto Differential [[id number]]  (Abnormal) Collected: 04/17/22 0732    Specimen: Blood Updated: 04/17/22 0813     WBC 12.14 10*3/mm3      RBC 2.32 10*6/mm3      Hemoglobin 6.6 g/dL      Hematocrit 19.4 %      MCV 83.6 fL      MCH 28.4 pg      MCHC 34.0 g/dL      RDW 14.6 %      RDW-SD 44.3 fl      MPV 9.6 fL      Platelets 161 10*3/mm3      Neutrophil % 66.7 %      Lymphocyte % 17.6 %      Monocyte % 10.5 %      Eosinophil % 1.5 %      Basophil % 0.2 %      Immature Grans % 3.5 %      Neutrophils, Absolute 8.08 10*3/mm3      Lymphocytes, Absolute 2.14 10*3/mm3      Monocytes, Absolute 1.28 10*3/mm3      Eosinophils, Absolute 0.18 10*3/mm3      Basophils, Absolute 0.03 10*3/mm3      Immature Grans, Absolute 0.43 10*3/mm3      nRBC 0.4 /100 WBC     Narrative:      ckd with   Recollect     Phosphorus [[id number]]  (Normal) Collected: 04/17/22 0357    Specimen: Blood Updated: 04/17/22 0425     Phosphorus 3.1 mg/dL     Magnesium [[id number]]  (Normal) Collected: 04/17/22 0357    Specimen: Blood Updated: 04/17/22 0425     Magnesium 1.9 mg/dL     Comprehensive Metabolic Panel [[id number]]  (Abnormal) Collected: 04/17/22 0357    Specimen: Blood Updated: 04/17/22 0424     Glucose 126 mg/dL      BUN 11 mg/dL      Creatinine 1.33 mg/dL      Sodium 140 mmol/L      Potassium 3.8 mmol/L      Chloride 104 mmol/L      CO2 25.0 mmol/L      Calcium 7.5 mg/dL      Total Protein 4.0 g/dL      Albumin 1.80 g/dL      ALT (SGPT) 15 U/L      AST (SGOT) 28 U/L      Alkaline Phosphatase 63 U/L      Total Bilirubin 0.4 mg/dL      Globulin 2.2 gm/dL      A/G Ratio 0.8 g/dL      BUN/Creatinine Ratio 8.3     Anion Gap 11.0 mmol/L      eGFR 50.7 mL/min/1.73      Comment: National Kidney Foundation and American Society of Nephrology (ASN) Task Force recommended calculation based on the Chronic Kidney Disease Epidemiology Collaboration (CKD-EPI) equation refit without adjustment for race.       Narrative:      GFR Normal >60  Chronic Kidney Disease <60  Kidney Failure <15      Blood Culture - Blood, Arm, Left [[id number]]  (Normal) Collected: 04/16/22 0346    Specimen: Blood from Arm, Left Updated: 04/17/22 0417     Blood Culture No growth at 24 hours    Protime-INR [[id number]]  (Abnormal) Collected: 04/17/22 0357    Specimen: Blood Updated: 04/17/22 0416     Protime 16.3 Seconds      INR 1.37    aPTT [[id number]]  (Abnormal) Collected: 04/17/22 0357    Specimen: Blood Updated: 04/17/22 0416     PTT 36.7 seconds     Blood Culture - Blood, Arm, Left [[id number]]  (Normal) Collected: 04/16/22 0112    Specimen: Blood from Arm, Left Updated: 04/17/22 0147     Blood Culture No growth at 24 hours    Blood Culture With AASHISH - Blood, Arm, Right [[id number]]  (Normal) Collected: 04/14/22 1938    Specimen: Blood from Arm, Right  Updated: 04/16/22 2017     Blood Culture No growth at 2 days    Phosphorus [[id number]]  (Abnormal) Collected: 04/16/22 1511    Specimen: Blood Updated: 04/16/22 1554     Phosphorus 1.4 mg/dL     Basic Metabolic Panel [[id number]]  (Abnormal) Collected: 04/16/22 1511    Specimen: Blood Updated: 04/16/22 1543     Glucose 138 mg/dL      BUN 14 mg/dL      Creatinine 1.77 mg/dL      Sodium 143 mmol/L      Potassium 3.1 mmol/L      Chloride 108 mmol/L      CO2 24.0 mmol/L      Calcium 7.8 mg/dL      BUN/Creatinine Ratio 7.9     Anion Gap 11.0 mmol/L      eGFR 36.0 mL/min/1.73      Comment: National Kidney Foundation and American Society of Nephrology (ASN) Task Force recommended calculation based on the Chronic Kidney Disease Epidemiology Collaboration (CKD-EPI) equation refit without adjustment for race.       Narrative:      GFR Normal >60  Chronic Kidney Disease <60  Kidney Failure <15      Magnesium [[id number]]  (Abnormal) Collected: 04/16/22 1511    Specimen: Blood Updated: 04/16/22 1537     Magnesium 1.5 mg/dL     Hemoglobin & Hematocrit, Blood [[id number]]  (Abnormal) Collected: 04/16/22 1511    Specimen: Blood Updated: 04/16/22 1524     Hemoglobin 9.0 g/dL      Hematocrit 26.3 %            Cultures:  Blood Culture   Date Value Ref Range Status   04/16/2022 No growth at 24 hours  Preliminary   04/16/2022 No growth at 24 hours  Preliminary   04/14/2022 No growth at 2 days  Preliminary     Urine Culture   Date Value Ref Range Status   04/14/2022 >100,000 CFU/mL Gram Negative Bacilli (A)  Preliminary       Radiology Data:    Imaging Results (Last 24 Hours)     Procedure Component Value Units Date/Time    MRI Brain Without Contrast [[id number]] Collected: 04/16/22 1842     Updated: 04/16/22 1850    Narrative:      EXAMINATION:   MRI BRAIN WO CONTRAST-  4/16/2022 6:42 PM CDT     HISTORY: MRI brain without contrast 4/16/2022 6:00 PM CDT     History: Unresponsive staring acute neuro change; E87.6-Hypokalemia;  N39.0-Urinary  tract infection, site not specified; R31.9-Hematuria,  unspecified; R11.2-Nausea with vomiting, unspecified; E87.2-Acidosis;  R13.10-Dysphagia, unspecified; A41.9-Sepsis, unspecified organism;  N39.0-Urinary tract infection, site not specified     Comparison: None.       Technique: Multiplanar imaging of the brain was performed in a routine  fashion.      Findings:   Midline structures are nondisplaced. Ventricular system is normal. There  is no significant mass effect or hydrocephalus. Basilar cisterns are  preserved. Abnormal restricted diffusion is noted bilaterally in the  occipital lobes.. No abnormal extra axial fluid collections are noted.  Restricted diffusion is noted which is subcortical in the bilateral  parietal cortex. This may be an encephalitis. The FLAIR sequence is not  as intense as the restricted diffusion.     Mild cortical volume loss is noted..      Proximal cervical spinal cord, brainstem, and cerebellum are  unremarkable. Normal cerebrovascular flow voids are seen. Bilateral  globes and orbits are normal in appearance.     No abnormal signal is noted in the mastoid air cells or paranasal  sinuses.        Impression:      Impression:   Abnormal signal with restricted diffusion in the bilateral occipital and  also subcortical bilateral parietal cortex. Most likely this is an  encephalitis.         This report was finalized on 04/16/2022 18:47 by Dr. Nadeem Marshall MD.    CT Head Without Contrast [[id number]] Collected: 04/16/22 1150     Updated: 04/16/22 1156    Narrative:      EXAM/TECHNIQUE: CT HEAD WO CONTRAST-     INDICATION: Mental status change, generalized weakness, dizziness     COMPARISON: 04/11/2022     DLP: 643 mGy cm. Automated exposure control was also utilized to  decrease patient radiation dose.     FINDINGS:     No evidence of acute intracranial hemorrhage. No loss of gray-white  differentiation. No midline shift or mass effect. Lateral ventricles are  nondilated. Basilar  cisterns are patent. No acute orbital finding.  Mastoid air cells are clear. Visualized paranasal sinuses are clear.  Partially imaged nasogastric tube. No acute osseous finding.       Impression:         No acute intracranial findings.  This report was finalized on 04/16/2022 11:53 by Dr. Gomez Cárdenas MD.    XR Abdomen KUB [[id number]] Collected: 04/16/22 1100     Updated: 04/16/22 1105    Narrative:      EXAM: XR ABDOMEN KUB-     INDICATION: NG placement; E87.6-Hypokalemia; N39.0-Urinary tract  infection, site not specified; R31.9-Hematuria, unspecified;  R11.2-Nausea with vomiting, unspecified; E87.2-Acidosis;  R13.10-Dysphagia, unspecified; A41.9-Sepsis, unspecified organism;  N39.0-Urinary tract infection, site not specified     COMPARISON: None available.     FINDINGS:     Nasogastric tube tip projects over the expected location of the gastric  fundus. A left-sided nephrostomy tube/ureteral stent is in place. There  is marked gaseous distention of the stomach, small bowel, and colon.  There is a transverse colon measures up to 8 mm in diameter. Small bowel  loops measure up to 3.37 m in diameter. A RIGHT femoral venous line is  noted.       Impression:         1.  Enteric tube projects over the proximal stomach.  2.  Marked distention of the stomach, small bowel, and colon. Favor  ileus.  This report was finalized on 04/16/2022 11:02 by Dr. Gomez Cárdenas MD.          Allergies   Allergen Reactions   • Coconut Unknown - High Severity   • Nuts Unknown - High Severity   • Penicillins    • [place] Other (See Comments)     Causes migraines per pt reports       Scheduled meds:   ertapenem, 1 g, Intravenous, Q24H  famotidine, 20 mg, Intravenous, Daily  FLUoxetine, 20 mg, Oral, Nightly  fluticasone, 2 spray, Each Nare, BID  folic acid, 1 mg, Oral, Daily  neomycin-polymyxin-hydrocortisone, 4 drop, Both Ears, Q6H  oxymetazoline, 2 spray, Nasal, BID  sodium bicarbonate, 650 mg, Oral, BID  sodium chloride, 10 mL,  Intravenous, Q12H  SUMAtriptan, 50 mg, Oral, Once        PRN meds:  hydrALAZINE  •  labetalol  •  ondansetron **OR** ondansetron  •  potassium chloride **OR** potassium chloride **OR** potassium chloride  •  potassium phosphate infusion greater than 15 mMoles **OR** potassium phosphate infusion greater than 15 mMoles **OR** potassium phosphate **OR** sodium phosphate IVPB **OR** sodium phosphate IVPB **OR** sodium phosphate IVPB  •  sodium chloride  •  [COMPLETED] Insert peripheral IV **AND** sodium chloride  •  [COMPLETED] Insert peripheral IV **AND** sodium chloride  •  sodium chloride    Assessment/Plan       Intractable nausea and vomiting    Sepsis secondary to UTI (HCC)    Lactic acidosis    Hypokalemia    Essential (primary) hypertension    UTI (urinary tract infection), bacterial    Malfunction of nephrostomy tube (HCC)    Severe malnutrition (CMS/HCC)    Hypophosphatemia      Plan:  HPI.  Patient was admitted on 4/3 by Dr. Ricks.  Had COVID-19 here last year and ended up having an abdominal hematoma and had to go to [place].  Was there for a long time and then an LTAC and then a skilled nursing facility.  Comes back with multidrug-resistant UTI on Invanz.   She presented with complaints of intractable nausea and vomiting.  Her medical problems started last August when she developed COVID-19 pneumonia and had numerous complications thereafter.  She developed an abdominal hematoma and had to go on dialysis secondary to extrinsic compression of her urinary system. She was transferred from here to [facility] and required exploratory laparotomy.  She ended up having bilateral percutaneous nephrostomy tubes and also ended up with a left ureteral stent.  She states that her main urologist is Dr. Rina Rey.  She states that she stopped producing urine from her percutaneous nephrostomy tube recently and that there are plans for it to be removed.  She states that she had gotten in touch with  [facility] to see if someone here could do it so she would not have to travel.  When she left [facility], she spent considerable time at a skilled nursing facility in [place], [place].  She states that she has only been home a few weeks.  She was admitted to [facility] in [place] on 3/9 for electrolyte abnormalities.     UTI/abscess left abductor katie muscle.  Renal function stable yesterday with a creatinine 0.76.  Dr. Ricks began treating a urinary tract infection with ceftriaxone.  She had a multidrug-resistant Klebsiella in October 2021.  [facility].  Lactate down to 1.7 from 2.8 after fluids.  Urine does appear infected with too numerous to count white blood cells, 4+ bacteria and large leukocyte esterase. The sample was also bloody.  Urine culture has grown 2 distinct pathogens.  There is a multidrug-resistant Proteus mirabilis strain and an ESBL Klebsiella strain.  Invanz should be treating both of these organisms concurrently.  Today is day 5 of Invanz.  We will treat 7-10 days.  Lactic acidosis improved after fluids and antibiotics.  Procalcitonin elevated at 0.31.  Dr. Dyer is familiar with her previous problems as he saw her last year.  I consulted him to evaluate her.  He wants to treat her current infection and then have her follow back at [facility].  Recommendation from urology-  Urology saw her and wants her to go back to [facility] to have her percutaneous nephrostomy pulled.  Long discussion with Dr. Haro yesterday urology at [facility], covering for Dr. Rey 4/14/22.  Recommended for infectious disease consult and Strat[place] cath placement.  Discussed with transfer line this morning saying that Dr. Rey will call me today to reevaluated.  Urology  believes his refluxing causing recurrent infection, and recommended stratton cath placement. PCNU, stent place in [facility].  [facility] refused transfer 4/15/2022, due to capacity.  I asked to put her on the waiting list,  this was denied..  I gave a call  to [facility] 4/16/22 discussed a decline in mental status and hemoglobin drop and possible abscess in the left abductor katie muscles, again no capacity.   CT scan abdomen pelvic-Suspicious for abscess in the left abductor katie muscle, Left external/internal percutaneous nephrostomy ureterostomy catheter  is satisfactorily position, Fluid and air is present in the colon- associated with diarrhea.  Patient is on Invanz.  Leukocytosis improving.  I called  today, agreed except the patient on a waiting list, Dr. Torrez at [facility] 4/17/2022.     Altered mental status/encephalitis.    DC Fioricet as needed due to somnolence.  Reconsult neurology.  MRI of the head-Abnormal signal with restricted diffusion in the bilateral occipital and  also subcortical bilateral parietal cortex- likely this is an encephalitis.   Plan for EEG and spinal tap by neurology today.     Anemia/questionable bleeding.  Status post 2 units of blood 4/16/2022.  Hemoglobin today 6.6.  Plan for another transfusion of blood, 1 unit of blood. Stop Xarelto for/16/22 to.  No sign of acute bleed.  Continue folate.     Hypokalemia .  Resolved...  Magnesium-normal.     Sludge in gallbladder/dysfunctional gallbladder?.  General surgery consult.  GI consult. No surgery per general surgery.  HIDA scan-20% biliary ejection fraction.  Ultrasound abdomen pelvic-Sludge-filled gallbladder with no significant gallbladder wall thickening, pericholecystic fluid or convincing evidence of acute cholecystitis, Mild dilation of the common hepatic duct with no visualized choledocholithiasis, Hepatic steatosis.     Metabolic acidosis.  Resolved.  DC bicarb drip, change to LR.     Nausea/vomiting/ileus.  Nausea vomiting resolved.  DC Reglan.  Protonix. Bicarb. DC Inapsine as needed.  DC Compazine as needed. DC Phenergan as needed.   Plan for NG tube placement.    Renal insufficiency.  Improving.  Ongoing IV  hydration.     Hyppotension/tachycardia.  Levophed stopped yesterday 4/16/2022.  Stop Toprol     Sinus congestion/sinus pain.  Flonase.  Corticosporin eardrop.  ENT consult.  [place].  Dr. Nichols saw her and has wanted to do an audiogram, but she has declined to go over to his office the last 2 days.   CTA chest-No evidence of pulmonary embolus, Small LEFT pleural effusion, Bandlike areas of reticulation throughout both lungs likely representing scarring/fibrosis related to prior Covid 19 infection.      History of pulmonary embolus.  Patient had Xarelto on 4/11 and 4/12.  Patient has Lovenox 4/14 and 4/15.  Last dose of Lovenox was 1749 on the 15th.  Anticoagulation held due to anemia and possible bleeding.     Anxiety/depression/insomnia . Decrease Prozac nightly due to somnolence.   DC Ambien as needed due to somnolence.  DC trazodone due to somnolent.     Pain.  DC Robaxin as needed due to somnolence. Morphine as needed.      Allergic rhinitis.  Flonase.     Nutrition.  Hold NG tube feeding due to ileus..     Deconditioning.  PT and OT consult.  Last time patient walk was in August of last year after Covid. Patient is mostly bedbound at the nursing home.     Blood culture-no growth in 24 hours.   Covid-19-negative.  Flu screen A and B-negative.  Urine culture gram-negative bacilli.     Discharge Planning: 3-6 days.     Electronically signed by Amado Villarreal MD, 04/17/22, 8:32 AM CDT.

## 2022-04-18 ENCOUNTER — APPOINTMENT (OUTPATIENT)
Dept: GENERAL RADIOLOGY | Facility: HOSPITAL | Age: 47
End: 2022-04-18

## 2022-04-18 ENCOUNTER — APPOINTMENT (OUTPATIENT)
Dept: MRI IMAGING | Facility: HOSPITAL | Age: 47
End: 2022-04-18

## 2022-04-18 LAB
ALBUMIN SERPL-MCNC: 1.6 G/DL (ref 3.5–5.2)
ALBUMIN/GLOB SERPL: 0.6 G/DL
ALP SERPL-CCNC: 74 U/L (ref 39–117)
ALT SERPL W P-5'-P-CCNC: 17 U/L (ref 1–33)
ANION GAP SERPL CALCULATED.3IONS-SCNC: 9 MMOL/L (ref 5–15)
ARTERIAL PATENCY WRIST A: POSITIVE
AST SERPL-CCNC: 45 U/L (ref 1–32)
ATMOSPHERIC PRESS: 756 MMHG
BASE EXCESS BLDA CALC-SCNC: 3 MMOL/L (ref 0–2)
BDY SITE: ABNORMAL
BH BB BLOOD EXPIRATION DATE: NORMAL
BH BB BLOOD TYPE BARCODE: 6200
BH BB DISPENSE STATUS: NORMAL
BH BB PRODUCT CODE: NORMAL
BH BB UNIT NUMBER: NORMAL
BILIRUB SERPL-MCNC: 0.5 MG/DL (ref 0–1.2)
BODY TEMPERATURE: 37 C
BUN SERPL-MCNC: 7 MG/DL (ref 6–20)
BUN/CREAT SERPL: 7.6 (ref 7–25)
C GATTII+NEOFOR DNA CSF QL NAA+NON-PROBE: NOT DETECTED
CALCIUM SPEC-SCNC: 7.9 MG/DL (ref 8.6–10.5)
CHLORIDE SERPL-SCNC: 106 MMOL/L (ref 98–107)
CMV DNA CSF QL NAA+PROBE: NOT DETECTED
CO2 SERPL-SCNC: 25 MMOL/L (ref 22–29)
CREAT SERPL-MCNC: 0.92 MG/DL (ref 0.57–1)
CROSSMATCH INTERPRETATION: NORMAL
DEPRECATED RDW RBC AUTO: 45.5 FL (ref 37–54)
E COLI K1 DNA CSF QL NAA+NON-PROBE: NOT DETECTED
EGFRCR SERPLBLD CKD-EPI 2021: 78.9 ML/MIN/1.73
EOSINOPHIL # BLD MANUAL: 0.11 10*3/MM3 (ref 0–0.4)
EOSINOPHIL NFR BLD MANUAL: 1 % (ref 0.3–6.2)
ERYTHROCYTE [DISTWIDTH] IN BLOOD BY AUTOMATED COUNT: 14.7 % (ref 12.3–15.4)
EV RNA CSF QL NAA+PROBE: NOT DETECTED
GLOBULIN UR ELPH-MCNC: 2.5 GM/DL
GLUCOSE SERPL-MCNC: 77 MG/DL (ref 65–99)
GP B STREP DNA SPEC QL NAA+PROBE: NOT DETECTED
HAEM INFLU SEROTYP DNA SPEC NAA+PROBE: NOT DETECTED
HCO3 BLDA-SCNC: 25.9 MMOL/L (ref 20–26)
HCT VFR BLD AUTO: 23.5 % (ref 34–46.6)
HGB BLD-MCNC: 7.9 G/DL (ref 12–15.9)
HHV6 DNA CSF QL NAA+PROBE: NOT DETECTED
HSV1 DNA CSF QL NAA+PROBE: NOT DETECTED
HSV2 DNA CSF QL NAA+PROBE: NOT DETECTED
INHALED O2 CONCENTRATION: 30 %
L MONOCYTOG RRNA SPEC QL PROBE: NOT DETECTED
LYMPHOCYTES # BLD MANUAL: 0.55 10*3/MM3 (ref 0.7–3.1)
LYMPHOCYTES NFR BLD MANUAL: 5.1 % (ref 5–12)
Lab: ABNORMAL
MAGNESIUM SERPL-MCNC: 1.9 MG/DL (ref 1.6–2.6)
MCH RBC QN AUTO: 28.7 PG (ref 26.6–33)
MCHC RBC AUTO-ENTMCNC: 33.6 G/DL (ref 31.5–35.7)
MCV RBC AUTO: 85.5 FL (ref 79–97)
MODALITY: ABNORMAL
MONOCYTES # BLD: 0.57 10*3/MM3 (ref 0.1–0.9)
N MEN DNA SPEC QL NAA+PROBE: NOT DETECTED
NEUTROPHILS # BLD AUTO: 9.85 10*3/MM3 (ref 1.7–7)
NEUTROPHILS NFR BLD MANUAL: 88.9 % (ref 42.7–76)
NEUTS VAC BLD QL SMEAR: ABNORMAL
NRBC SPEC MANUAL: 1 /100 WBC (ref 0–0.2)
PARECHOVIRUS A RNA CSF QL NAA+NON-PROBE: NOT DETECTED
PCO2 BLDA: 32 MM HG (ref 35–45)
PCO2 TEMP ADJ BLD: 32 MM HG (ref 35–45)
PEEP RESPIRATORY: 5 CM[H2O]
PH BLDA: 7.52 PH UNITS (ref 7.35–7.45)
PH, TEMP CORRECTED: 7.52 PH UNITS (ref 7.35–7.45)
PLAT MORPH BLD: NORMAL
PLATELET # BLD AUTO: 185 10*3/MM3 (ref 140–450)
PMV BLD AUTO: 10.1 FL (ref 6–12)
PO2 BLDA: 134 MM HG (ref 83–108)
PO2 TEMP ADJ BLD: 134 MM HG (ref 83–108)
POLYCHROMASIA BLD QL SMEAR: ABNORMAL
POTASSIUM SERPL-SCNC: 3.4 MMOL/L (ref 3.5–5.2)
PROT SERPL-MCNC: 4.1 G/DL (ref 6–8.5)
RBC # BLD AUTO: 2.75 10*6/MM3 (ref 3.77–5.28)
S PNEUM DNA CSF QL NAA+NON-PROBE: NOT DETECTED
SAO2 % BLDCOA: 99.8 % (ref 94–99)
SET MECH RESP RATE: 16
SODIUM SERPL-SCNC: 140 MMOL/L (ref 136–145)
TARGETS BLD QL SMEAR: ABNORMAL
UNIT  ABO: NORMAL
UNIT  RH: NORMAL
VARIANT LYMPHS NFR BLD MANUAL: 1 % (ref 0–5)
VARIANT LYMPHS NFR BLD MANUAL: 4 % (ref 19.6–45.3)
VENTILATOR MODE: AC
VT ON VENT VENT: 450 ML
VZV DNA CSF QL NAA+PROBE: NOT DETECTED
WBC NRBC COR # BLD: 11.08 10*3/MM3 (ref 3.4–10.8)

## 2022-04-18 PROCEDURE — 0 POTASSIUM CHLORIDE 10 MEQ/100ML SOLUTION: Performed by: FAMILY MEDICINE

## 2022-04-18 PROCEDURE — 94799 UNLISTED PULMONARY SVC/PX: CPT

## 2022-04-18 PROCEDURE — A9577 INJ MULTIHANCE: HCPCS | Performed by: FAMILY MEDICINE

## 2022-04-18 PROCEDURE — 82803 BLOOD GASES ANY COMBINATION: CPT

## 2022-04-18 PROCEDURE — 80053 COMPREHEN METABOLIC PANEL: CPT | Performed by: FAMILY MEDICINE

## 2022-04-18 PROCEDURE — 25010000002 PROPOFOL 1000 MG/100ML EMULSION

## 2022-04-18 PROCEDURE — 25010000002 LEVETIRACETAM IN NACL 0.82% 500 MG/100ML SOLUTION: Performed by: PSYCHIATRY & NEUROLOGY

## 2022-04-18 PROCEDURE — 85025 COMPLETE CBC W/AUTO DIFF WBC: CPT | Performed by: FAMILY MEDICINE

## 2022-04-18 PROCEDURE — 31500 INSERT EMERGENCY AIRWAY: CPT

## 2022-04-18 PROCEDURE — 71045 X-RAY EXAM CHEST 1 VIEW: CPT

## 2022-04-18 PROCEDURE — 70553 MRI BRAIN STEM W/O & W/DYE: CPT

## 2022-04-18 PROCEDURE — 36600 WITHDRAWAL OF ARTERIAL BLOOD: CPT

## 2022-04-18 PROCEDURE — 99232 SBSQ HOSP IP/OBS MODERATE 35: CPT

## 2022-04-18 PROCEDURE — 99291 CRITICAL CARE FIRST HOUR: CPT | Performed by: PSYCHIATRY & NEUROLOGY

## 2022-04-18 PROCEDURE — 5A1945Z RESPIRATORY VENTILATION, 24-96 CONSECUTIVE HOURS: ICD-10-PCS | Performed by: FAMILY MEDICINE

## 2022-04-18 PROCEDURE — 25010000002 PROPOFOL 10 MG/ML EMULSION: Performed by: FAMILY MEDICINE

## 2022-04-18 PROCEDURE — 25010000002 MIDAZOLAM PER 1 MG

## 2022-04-18 PROCEDURE — 94002 VENT MGMT INPAT INIT DAY: CPT

## 2022-04-18 PROCEDURE — 25010000002 ERTAPENEM PER 500 MG: Performed by: FAMILY MEDICINE

## 2022-04-18 PROCEDURE — 85007 BL SMEAR W/DIFF WBC COUNT: CPT | Performed by: FAMILY MEDICINE

## 2022-04-18 PROCEDURE — 83735 ASSAY OF MAGNESIUM: CPT | Performed by: PSYCHIATRY & NEUROLOGY

## 2022-04-18 PROCEDURE — 0BH17EZ INSERTION OF ENDOTRACHEAL AIRWAY INTO TRACHEA, VIA NATURAL OR ARTIFICIAL OPENING: ICD-10-PCS | Performed by: FAMILY MEDICINE

## 2022-04-18 PROCEDURE — 0 GADOBENATE DIMEGLUMINE 529 MG/ML SOLUTION: Performed by: FAMILY MEDICINE

## 2022-04-18 RX ORDER — NOREPINEPHRINE BIT/0.9 % NACL 8 MG/250ML
.02-.3 INFUSION BOTTLE (ML) INTRAVENOUS
Status: DISCONTINUED | OUTPATIENT
Start: 2022-04-18 | End: 2022-04-21 | Stop reason: HOSPADM

## 2022-04-18 RX ORDER — MIDAZOLAM HYDROCHLORIDE 1 MG/ML
5 INJECTION INTRAMUSCULAR; INTRAVENOUS ONCE
Status: COMPLETED | OUTPATIENT
Start: 2022-04-18 | End: 2022-04-18

## 2022-04-18 RX ORDER — PROPOFOL 10 MG/ML
INJECTION, EMULSION INTRAVENOUS
Status: COMPLETED
Start: 2022-04-18 | End: 2022-04-18

## 2022-04-18 RX ORDER — LEVETIRACETAM 5 MG/ML
500 INJECTION INTRAVASCULAR ONCE
Status: COMPLETED | OUTPATIENT
Start: 2022-04-18 | End: 2022-04-18

## 2022-04-18 RX ORDER — MIDAZOLAM HYDROCHLORIDE 1 MG/ML
INJECTION INTRAMUSCULAR; INTRAVENOUS
Status: COMPLETED
Start: 2022-04-18 | End: 2022-04-18

## 2022-04-18 RX ADMIN — Medication 0.02 MCG/KG/MIN: at 13:03

## 2022-04-18 RX ADMIN — SODIUM BICARBONATE 650 MG: 650 TABLET ORAL at 08:30

## 2022-04-18 RX ADMIN — SODIUM CHLORIDE, POTASSIUM CHLORIDE, SODIUM LACTATE AND CALCIUM CHLORIDE 75 ML/HR: 600; 310; 30; 20 INJECTION, SOLUTION INTRAVENOUS at 00:48

## 2022-04-18 RX ADMIN — MIDAZOLAM 5 MG: 1 INJECTION INTRAMUSCULAR; INTRAVENOUS at 12:57

## 2022-04-18 RX ADMIN — GADOBENATE DIMEGLUMINE 14 ML: 529 INJECTION, SOLUTION INTRAVENOUS at 15:48

## 2022-04-18 RX ADMIN — NEOMYCIN SULFATE, POLYMYXIN B SULFATE AND HYDROCORTISONE 4 DROP: 10; 3.5; 1 SUSPENSION/ DROPS AURICULAR (OTIC) at 12:06

## 2022-04-18 RX ADMIN — FAMOTIDINE 20 MG: 10 INJECTION INTRAVENOUS at 08:30

## 2022-04-18 RX ADMIN — POTASSIUM CHLORIDE 10 MEQ: 7.46 INJECTION, SOLUTION INTRAVENOUS at 05:43

## 2022-04-18 RX ADMIN — FAMOTIDINE 20 MG: 10 INJECTION INTRAVENOUS at 21:10

## 2022-04-18 RX ADMIN — PROPOFOL INJECTABLE EMULSION 15 MCG/KG/MIN: 10 INJECTION, EMULSION INTRAVENOUS at 21:10

## 2022-04-18 RX ADMIN — FLUOXETINE HYDROCHLORIDE 20 MG: 20 CAPSULE ORAL at 21:10

## 2022-04-18 RX ADMIN — POTASSIUM CHLORIDE 10 MEQ: 7.46 INJECTION, SOLUTION INTRAVENOUS at 07:34

## 2022-04-18 RX ADMIN — LEVETIRACETAM 500 MG: 5 INJECTION INTRAVASCULAR at 11:53

## 2022-04-18 RX ADMIN — POTASSIUM CHLORIDE 10 MEQ: 7.46 INJECTION, SOLUTION INTRAVENOUS at 08:39

## 2022-04-18 RX ADMIN — PROPOFOL INJECTABLE EMULSION 5 MCG/KG/MIN: 10 INJECTION, EMULSION INTRAVENOUS at 13:04

## 2022-04-18 RX ADMIN — FLUTICASONE PROPIONATE 2 SPRAY: 50 SPRAY, METERED NASAL at 08:31

## 2022-04-18 RX ADMIN — MIDAZOLAM HYDROCHLORIDE 5 MG: 1 INJECTION INTRAMUSCULAR; INTRAVENOUS at 12:57

## 2022-04-18 RX ADMIN — LEVETIRACETAM 500 MG: 5 INJECTION INTRAVASCULAR at 08:30

## 2022-04-18 RX ADMIN — Medication 10 ML: at 21:11

## 2022-04-18 RX ADMIN — SODIUM CHLORIDE, POTASSIUM CHLORIDE, SODIUM LACTATE AND CALCIUM CHLORIDE 75 ML/HR: 600; 310; 30; 20 INJECTION, SOLUTION INTRAVENOUS at 14:11

## 2022-04-18 RX ADMIN — POTASSIUM CHLORIDE 10 MEQ: 7.46 INJECTION, SOLUTION INTRAVENOUS at 06:43

## 2022-04-18 RX ADMIN — FOLIC ACID 1 MG: 1 TABLET ORAL at 21:10

## 2022-04-18 RX ADMIN — Medication 10 ML: at 08:30

## 2022-04-18 RX ADMIN — NEOMYCIN SULFATE, POLYMYXIN B SULFATE AND HYDROCORTISONE 4 DROP: 10; 3.5; 1 SUSPENSION/ DROPS AURICULAR (OTIC) at 05:43

## 2022-04-18 RX ADMIN — LEVETIRACETAM 500 MG: 5 INJECTION INTRAVASCULAR at 21:10

## 2022-04-18 RX ADMIN — NEOMYCIN SULFATE, POLYMYXIN B SULFATE AND HYDROCORTISONE 4 DROP: 10; 3.5; 1 SUSPENSION/ DROPS AURICULAR (OTIC) at 17:14

## 2022-04-18 RX ADMIN — FLUTICASONE PROPIONATE 2 SPRAY: 50 SPRAY, METERED NASAL at 21:11

## 2022-04-18 RX ADMIN — ERTAPENEM SODIUM 1 G: 1 INJECTION, POWDER, LYOPHILIZED, FOR SOLUTION INTRAMUSCULAR; INTRAVENOUS at 17:14

## 2022-04-18 RX ADMIN — SODIUM BICARBONATE 650 MG: 650 TABLET ORAL at 21:10

## 2022-04-18 NOTE — PROGRESS NOTES
[facility] Medicine Services  INPATIENT PROGRESS NOTE    Patient Name: Namita Zabala  Date of Admission: 4/2/2022  Today's Date: 04/18/22  Length of Stay: 14  Primary Care Physician: David Lara MD    Subjective   Chief Complaint: AMS  HPI   Patient is non-responsive.  She only groans/grunts  Does not open eyes to speech or pain.  Does not withdraw.          Review of Systems   Unable to perform ROS: Mental status change        All pertinent negatives and positives are as above. All other systems have been reviewed and are negative unless otherwise stated.     Objective    Temp:  [97.2 °F (36.2 °C)-99 °F (37.2 °C)] 99 °F (37.2 °C)  Heart Rate:  [] 101  Resp:  [13-18] 17  BP: ()/(48-97) 80/52  Physical Exam  Constitutional:       Appearance: Normal appearance. She is well-developed.   HENT:      Head: Normocephalic and atraumatic.      Right Ear: Tympanic membrane, ear canal and external ear normal.      Left Ear: Tympanic membrane, ear canal and external ear normal.      Nose: Nose normal.      Mouth/Throat:      Mouth: Mucous membranes are moist.      Pharynx: Oropharynx is clear.   Eyes:      Extraocular Movements: Extraocular movements intact.      Conjunctiva/sclera: Conjunctivae normal.      Pupils: Pupils are equal, round, and reactive to light.   Cardiovascular:      Rate and Rhythm: Normal rate and regular rhythm.      Heart sounds: Normal heart sounds.   Pulmonary:      Effort: Pulmonary effort is normal.      Breath sounds: Normal breath sounds.   Abdominal:      General: Bowel sounds are normal. There is no distension.      Palpations: Abdomen is soft.      Tenderness: There is no abdominal tenderness.   Musculoskeletal:         General: Normal range of motion.      Cervical back: Normal range of motion and neck supple.   Skin:     General: Skin is warm and dry.   Neurological:      Mental Status: She is unresponsive.      GCS: GCS eye  subscore is 1. GCS verbal subscore is 2. GCS motor subscore is 1.   Psychiatric:         Speech: She is noncommunicative.         Behavior: Behavior is agitated and slowed.             Results Review:  I have reviewed the labs, radiology results, and diagnostic studies.    Laboratory Data:   Results from last 7 days   Lab Units 04/18/22  0231 04/17/22  1417 04/17/22  0732   WBC 10*3/mm3 11.08* 12.54* 12.14*   HEMOGLOBIN g/dL 7.9* 8.8* 6.6*   HEMATOCRIT % 23.5* 25.1* 19.4*   PLATELETS 10*3/mm3 185 162 161        Results from last 7 days   Lab Units 04/18/22  0231 04/17/22  0357 04/16/22  1511 04/16/22  0112   SODIUM mmol/L 140 140 143 138   POTASSIUM mmol/L 3.4* 3.8 3.1* 3.6   CHLORIDE mmol/L 106 104 108* 103   CO2 mmol/L 25.0 25.0 24.0 17.0*   BUN mg/dL 7 11 14 17   CREATININE mg/dL 0.92 1.33* 1.77* 2.25*   CALCIUM mg/dL 7.9* 7.5* 7.8* 7.7*   BILIRUBIN mg/dL 0.5 0.4  --  0.3   ALK PHOS U/L 74 63  --  70   ALT (SGPT) U/L 17 15  --  12   AST (SGOT) U/L 45* 28  --  23   GLUCOSE mg/dL 77 126* 138* 195*       Culture Data:   Blood Culture   Date Value Ref Range Status   04/16/2022 No growth at 2 days  Preliminary   04/16/2022 No growth at 2 days  Preliminary   04/14/2022 No growth at 3 days  Preliminary     Urine Culture   Date Value Ref Range Status   04/14/2022 >100,000 CFU/mL Klebsiella oxytoca ESBL (A)  Final     Comment:     Consider infectious disease consult.  Susceptibility results may not correlate to clinical outcomes.       Radiology Data:   Imaging Results (Last 24 Hours)     Procedure Component Value Units Date/Time    IR LUMBAR PUNCTURE DIAGNOSTIC [[id number]] Collected: 04/17/22 1300     Updated: 04/17/22 1304    Narrative:      EXAM: IR LUMBAR PUNCTURE DIAGNOSTIC- - 4/17/2022 12:06 PM CDT     HISTORY: unresponsive and abnormal mri; E87.6-Hypokalemia; N39.0-Urinary  tract infection, site not specified; R31.9-Hematuria, unspecified;  R11.2-Nausea with vomiting, unspecified; E87.2-Acidosis;  R13.10-Dysphagia,  unspecified; A41.9-Sepsis, unspecified organism;  N39.0-Urinary tract infection, site not specified       COMPARISON: None.      TECHNIQUE: Fluoroscopy-guided lumbar puncture for CSF.  Number of images: 2  Fluoroscopy time: 20 seconds  Dose: 13.8 mGy     PROCEDURE:   After discussing risks, benefits and alternatives of the procedure with  the patient's family, written consent was obtained.      A timeout was performed including the patient's name, date of birth and  procedure to be performed.     The patient was prepped and draped in sterile fashion. 1 percent  lidocaine was administered for local anesthesia.     Under fluoroscopic guidance, a 20-gauge needle was advanced into the  thecal sac at the L3-L4 level. 10 mL of clear fluid was removed. The  patient tolerated the procedure well, and there were no immediate  complications.      The CSF was sent for analysis per referring clinician's orders.     Opening pressure: 13.8 cm of water       Impression:      Successful fluoroscopic-guided lumbar puncture as described. 10 mL clear  CSF sent to the laboratory..  This report was finalized on 04/17/2022 13:00 by Dr. Gomez Cárdenas MD.          I have reviewed the patient's current medications.     Assessment/Plan     Active Hospital Problems    Diagnosis    • **Intractable nausea and vomiting    • Severe malnutrition (CMS/HCC)    • Hypophosphatemia    • Hypokalemia    • UTI (urinary tract infection), bacterial    • Malfunction of nephrostomy tube (HCC)    • Lactic acidosis    • Sepsis secondary to UTI (HCC)    • Essential (primary) hypertension      1.  AMS:  Metabolic Encephalopathy vs Encephalitis  -Encephalitis panel pending  -Paraneoplastic panel pending  -Neuro following  -on Empiric Keppra    2.  Thigh hematoma  -AC held  -Surgery    3.  PE  -will get duplex  -will discuss with surgery if/when ok to resume AC    4.  ESBL UTI  -IV Abx  -ID following    5.  HTN  -monitor    6.  History of bladder rupture  -continue  stratton    7.  Presence of Nephrostomy tubes  -monitor  -will need removal at some point, can be done as outpatient        Discharge Planning: Transfer arrangements pending.    Electronically signed by Brody Potter MD, 04/18/22, 10:38 CDT.

## 2022-04-18 NOTE — PROGRESS NOTES
Neurology Progress Note      Date of admission: 4/2/2022  8:46 PM  Date of visit: 4/18/2022    Chief Complaint:  F/u encephalopathy    Subjective     Subjective:  Patient remains moaning and otherwise nonverbal with GCS of 8 Not following commands and only spontaneous movement was right leg distally but not withdrawing from noxious stimuli of left leg.  However sometime yesterday she was found to be hypertensive in the 170's even though she has been needing levophed so the  nurse went in and noted foaming at the mouth and her biting down. Neuro was not notified.  Initial EEG on 4/11 was normal and EEG done yesterday was diffusely slow. Based on her MRI I had already empirically began her on IV Keppra as one nurse had noted a tremor at one time  Unfortunately it was never communicated with neuro as both of these events occurred during the night and when communicated with day nurse was not well described and no seizure like activity was ever thought to have occurred otherwise. None ever witnessed by 7 AM to 7 PM nursing.     Currently she is unable to clear secretions and cannot lie flat. She is not hypoxic but will not be able to get any further supine studies without intubation    Patient's LP is not consistent with infection. She has not woken up with IV Keppra 1 gram and now on 500 mg IV q 12 for empiric seizures   has indicated he wants her transferred but I tried to call him today and he is not picking up phone and his mailbox is full. There was no significant change in her neuro status with this    Medications:  Current Facility-Administered Medications   Medication Dose Route Frequency Provider Last Rate Last Admin   • ertapenem (INVanz) 1 g in sodium chloride 0.9 % 100 mL IVPB-VTB  1 g Intravenous Q24H Amado Villarreal  mL/hr at 04/17/22 1610 1 g at 04/17/22 1610   • famotidine (PEPCID) injection 20 mg  20 mg Intravenous Q12H Amado Villarreal MD   20 mg at 04/18/22 0830   • FLUoxetine (PROzac)  capsule 20 mg  20 mg Oral Nightly Amado Villarreal MD   20 mg at 04/17/22 2138   • fluticasone (FLONASE) 50 MCG/ACT nasal spray 2 spray  2 spray Each Nare BID Frank Ricks MD   2 spray at 04/18/22 0831   • folic acid (FOLVITE) tablet 1 mg  1 mg Oral Daily Amado Villarreal MD   1 mg at 04/17/22 2138   • Hold medication  1 each Does not apply Continuous PRN Abiola Yan MD       • hydrALAZINE (APRESOLINE) injection 10 mg  10 mg Intravenous Q4H PRN Amado Villarreal MD       • labetalol (NORMODYNE,TRANDATE) injection 10 mg  10 mg Intravenous Q4H PRN Amado Villarreal MD   10 mg at 04/10/22 1518   • lactated ringers infusion  75 mL/hr Intravenous Continuous Amado Villarreal MD 75 mL/hr at 04/18/22 0048 75 mL/hr at 04/18/22 0048   • levETIRAcetam in NaCl 0.82% (KEPPRA) IVPB 500 mg  500 mg Intravenous Q12H Abiola Yan MD   500 mg at 04/18/22 0830   • Morphine sulfate (PF) injection 2 mg  2 mg Intravenous Q4H PRN Amado Villarreal MD       • neomycin-polymyxin-hydrocortisone (CORTISPORIN) otic suspension 4 drop  4 drop Both Ears Q6H Fermin Mcclure DO   4 drop at 04/18/22 0543   • norepinephrine (LEVOPHED) 8 mg in 250 mL NS infusion (premix)  0.02-0.3 mcg/kg/min Intravenous Titrated Rajwinder Amaya APRN 4.91 mL/hr at 04/16/22 0645 0.04 mcg/kg/min at 04/16/22 0645   • ondansetron (ZOFRAN) tablet 4 mg  4 mg Oral Q6H PRN Frank Ricks MD        Or   • ondansetron (ZOFRAN) injection 4 mg  4 mg Intravenous Q6H PRN Frank Ricks MD   4 mg at 04/16/22 1000   • potassium chloride (MICRO-K) CR capsule 40 mEq  40 mEq Oral PRN Amado Villarreal MD        Or   • potassium chloride (KLOR-CON) packet 40 mEq  40 mEq Oral PRN Amado Villarreal MD        Or   • potassium chloride 10 mEq in 100 mL IVPB  10 mEq Intravenous Q1H PRN Amado Villarreal  mL/hr at 04/18/22 0839 10 mEq at 04/18/22 0839   • potassium phosphate 45 mmol in sodium chloride 0.9 % 500 mL infusion  45 mmol Intravenous PRN Amado Villarreal  MD        Or   • potassium phosphate 30 mmol in sodium chloride 0.9 % 250 mL infusion  30 mmol Intravenous PRN Amado Villarreal MD 31.3 mL/hr at 04/16/22 1900 30 mmol at 04/16/22 1900    Or   • Potassium Phosphates 15 mmol in sodium chloride 0.9 % 100 mL infusion  15 mmol Intravenous PRN Amado Villarreal MD        Or   • sodium phosphates 45 mmol in sodium chloride 0.9 % 500 mL IVPB  45 mmol Intravenous PRN Amado Villarreal MD        Or   • sodium phosphates 30 mmol in sodium chloride 0.9 % 250 mL IVPB  30 mmol Intravenous PRN Amado Villarreal MD        Or   • sodium phosphates 15 mmol in sodium chloride 0.9 % 250 mL IVPB  15 mmol Intravenous PRN Amado Villarreal MD       • sodium bicarbonate tablet 650 mg  650 mg Oral BID Amado Villarreal MD   650 mg at 04/18/22 0830   • sodium chloride 0.9 % flush 10 mL  10 mL Intravenous PRN Frank Ricks MD       • sodium chloride 0.9 % flush 10 mL  10 mL Intravenous PRN Frank Ricks MD       • sodium chloride 0.9 % flush 10 mL  10 mL Intravenous PRN Frank Ricks MD       • sodium chloride 0.9 % flush 10 mL  10 mL Intravenous Q12H Frank Ricks MD   10 mL at 04/18/22 0830   • sodium chloride 0.9 % flush 10 mL  10 mL Intravenous PRN Frank Ricks MD   10 mL at 04/10/22 1518       Review of Systems:   -A 14 point review of systems is unobtainable    Objective     Objective      Vital Signs  Temp:  [97.2 °F (36.2 °C)-99 °F (37.2 °C)] 99 °F (37.2 °C)  Heart Rate:  [] 109  Resp:  [13-18] 17  BP: ()/(48-97) 93/63    Physical Exam:    HEENT:  Neck supple  CVS:  Regular rate and rhythm.  No murmurs  Carotid Examination:  No bruits  Lungs:  Clear to auscultation  Abdomen:  Non-tender, Non-distended  Extremities:  No signs of peripheral edema    Neurologic Exam:    -  Is lying in bed moaning  Not spontaneously opening her eyes.      Cranial nerves II through XII intact.  When manually opened she does not blink to visual threat  Pupils react from 7 mm to 3  mm  Initially no dysconjugate movement and no doll's eyes. 1 hour later eyes were deviated going in opposite direction with left eye deviating down  Wrinkles forehead initially to noxious stimuli but later would not  Corneal reflexes were barely perceptible--very subtle movement of eyelids  Seems to hear initially and later no response    Motor: (strength out of 5:  1= minimal movement, 2 = movement in plane of gravity, 3 = movement against gravity, 4 = movement against some resistance, 5 = full strength)    -Right Upper Ext:Subtle increased tone and with withdrawal from noxious stimuli  -Left Upper Ext: Increased tone and withdrawal from noxious stimuli  -Right Lower Ext: spontaneously moved with dorsiflexion on her own --nothing being done to her and noted this with observation  -Left Lower Ext: No withdrawal to noxious stimuli  Examiner unable to manually at dorsiflex ankle  Some increased tone  DTR:  3+ throughout in upper extremities  Absent in lower and plantar responses are mute    Sensory:  -Withdraws to noxious stimuli    Coordination/Gait:  -No significant movement to assess     Results Review:    I reviewed the patient's new clinical results.    Lab Results (last 24 hours)     Procedure Component Value Units Date/Time    Non-gynecologic Cytology [[id number]] Collected: 04/17/22 1239    Specimen: Cerebrospinal Fluid from Lumbar Puncture Updated: 04/18/22 0848    Culture, CSF - Cerebrospinal Fluid, Lumbar Puncture [[id number]] Collected: 04/17/22 1239    Specimen: Cerebrospinal Fluid from Lumbar Puncture Updated: 04/18/22 0631     CSF Culture No growth     Gram Stain No WBCs or organisms seen    Manual Differential [[id number]]  (Abnormal) Collected: 04/18/22 0231    Specimen: Blood Updated: 04/18/22 0444     Neutrophil % 88.9 %      Lymphocyte % 4.0 %      Monocyte % 5.1 %      Eosinophil % 1.0 %      Atypical Lymphocyte % 1.0 %      Neutrophils Absolute 9.85 10*3/mm3      Lymphocytes Absolute 0.55 10*3/mm3       Monocytes Absolute 0.57 10*3/mm3      Eosinophils Absolute 0.11 10*3/mm3      nRBC 1.0 /100 WBC      Polychromasia Slight/1+     Target Cells Slight/1+     Vacuolated Neutrophils Slight/1+     Platelet Morphology Normal    CBC & Differential [[id number]]  (Abnormal) Collected: 04/18/22 0231    Specimen: Blood Updated: 04/18/22 0444    Narrative:      The following orders were created for panel order CBC & Differential.  Procedure                               Abnormality         Status                     ---------                               -----------         ------                     CBC Auto Differential[[id number]]        Abnormal            Final result                 Please view results for these tests on the individual orders.    CBC Auto Differential [[id number]]  (Abnormal) Collected: 04/18/22 0231    Specimen: Blood Updated: 04/18/22 0444     WBC 11.08 10*3/mm3      RBC 2.75 10*6/mm3      Hemoglobin 7.9 g/dL      Hematocrit 23.5 %      MCV 85.5 fL      MCH 28.7 pg      MCHC 33.6 g/dL      RDW 14.7 %      RDW-SD 45.5 fl      MPV 10.1 fL      Platelets 185 10*3/mm3     Comprehensive Metabolic Panel [[id number]]  (Abnormal) Collected: 04/18/22 0231    Specimen: Blood Updated: 04/18/22 0416     Glucose 77 mg/dL      BUN 7 mg/dL      Creatinine 0.92 mg/dL      Sodium 140 mmol/L      Potassium 3.4 mmol/L      Comment: Slight hemolysis detected by analyzer. Results may be affected.        Chloride 106 mmol/L      CO2 25.0 mmol/L      Calcium 7.9 mg/dL      Total Protein 4.1 g/dL      Albumin 1.60 g/dL      ALT (SGPT) 17 U/L      AST (SGOT) 45 U/L      Alkaline Phosphatase 74 U/L      Total Bilirubin 0.5 mg/dL      Globulin 2.5 gm/dL      A/G Ratio 0.6 g/dL      BUN/Creatinine Ratio 7.6     Anion Gap 9.0 mmol/L      eGFR 78.9 mL/min/1.73      Comment: National Kidney Foundation and American Society of Nephrology (ASN) Task Force recommended calculation based on the Chronic Kidney Disease Epidemiology  Collaboration (CKD-EPI) equation refit without adjustment for race.       Narrative:      GFR Normal >60  Chronic Kidney Disease <60  Kidney Failure <15      Magnesium [[id number]]  (Normal) Collected: 04/18/22 0231    Specimen: Blood Updated: 04/18/22 0416     Magnesium 1.9 mg/dL     Blood Culture - Blood, Arm, Left [[id number]]  (Normal) Collected: 04/16/22 0346    Specimen: Blood from Arm, Left Updated: 04/18/22 0416     Blood Culture No growth at 2 days    Blood Culture - Blood, Arm, Left [[id number]]  (Normal) Collected: 04/16/22 0112    Specimen: Blood from Arm, Left Updated: 04/18/22 0145     Blood Culture No growth at 2 days    Blood Culture With AASHISH - Blood, Arm, Right [[id number]]  (Normal) Collected: 04/14/22 1938    Specimen: Blood from Arm, Right Updated: 04/17/22 2015     Blood Culture No growth at 3 days    CBC (No Diff) [[id number]]  (Abnormal) Collected: 04/17/22 1417    Specimen: Blood Updated: 04/17/22 1434     WBC 12.54 10*3/mm3      RBC 2.96 10*6/mm3      Hemoglobin 8.8 g/dL      Hematocrit 25.1 %      MCV 84.8 fL      MCH 29.7 pg      MCHC 35.1 g/dL      RDW 14.9 %      RDW-SD 45.8 fl      MPV 9.6 fL      Platelets 162 10*3/mm3     Cryptococcal AG, CSF - Cerebrospinal Fluid, Lumbar Puncture [[id number]]  (Normal) Collected: 04/17/22 1239    Specimen: Cerebrospinal Fluid from Lumbar Puncture Updated: 04/17/22 1403     Cryptococcal Antigen, CSF Negative    Protein, CSF - Cerebrospinal Fluid, Lumbar Puncture [[id number]]  (Normal) Collected: 04/17/22 1239    Specimen: Cerebrospinal Fluid from Lumbar Puncture Updated: 04/17/22 1321     Protein, Total (CSF) 34.5 mg/dL     Glucose, CSF - Cerebrospinal Fluid, Lumbar Puncture [[id number]]  (Normal) Collected: 04/17/22 1239    Specimen: Cerebrospinal Fluid from Lumbar Puncture Updated: 04/17/22 1321     Glucose, CSF 65 mg/dL     Cell Count With Differential, CSF [[id number]]  (Abnormal) Collected: 04/17/22 8379    Specimen: Cerebrospinal Fluid from Lumbar  Puncture Updated: 04/17/22 1313    Narrative:      The following orders were created for panel order Cell Count With Differential, CSF.  Procedure                               Abnormality         Status                     ---------                               -----------         ------                     Cell Count, CSF - Cerebr...[[id number]]  Abnormal            Final result                 Please view results for these tests on the individual orders.    Cell Count, CSF - Cerebrospinal Fluid, Lumbar Puncture [[id number]]  (Abnormal) Collected: 04/17/22 1239    Specimen: Cerebrospinal Fluid from Lumbar Puncture Updated: 04/17/22 1313     Color, CSF --     Comment: All tubes colorlass        Supernatant Color, CSF Colorless     Appearance, CSF --     Comment: All tubes clear        RBC, CSF 4 /mm3      Nucleated Cells, CSF 1 /mm3      Volume, CSF 10.0 mL      Tube Number, CSF 4     Method: Hemacytometer Method    Narrative:      Differential not indicated.    Cell Count With Differential, CSF Use CSF Tube: 1 [[id number]]  (Abnormal) Collected: 04/17/22 1239    Specimen: Cerebrospinal Fluid from Lumbar Puncture Updated: 04/17/22 1306    Narrative:      The following orders were created for panel order Cell Count With Differential, CSF Use CSF Tube: 1.  Procedure                               Abnormality         Status                     ---------                               -----------         ------                     Cell Count, CSF - Cerebr...[[id number]]  Abnormal            Final result                 Please view results for these tests on the individual orders.    Cell Count, CSF - Cerebrospinal Fluid, Lumbar Puncture [[id number]]  (Abnormal) Collected: 04/17/22 1239    Specimen: Cerebrospinal Fluid from Lumbar Puncture Updated: 04/17/22 1306     Color, CSF --     Comment: All tubes colorless        Supernatant Color, CSF Colorless     Appearance, CSF --     Comment: All tubes clear        RBC, CSF 7 /mm3       Nucleated Cells, CSF 1 /mm3      Volume, CSF 10.0 mL      Tube Number, CSF 1     Method: Hemacytometer Method    Narrative:      Differential not indicated.    paraneoplastic panel/ autoimmune panel [[id number]] Collected: 04/17/22 1239    Specimen: Cerebrospinal Fluid from Lumbar Puncture Updated: 04/17/22 1253    Anaerobic Culture - Cerebrospinal Fluid, Spine, Lumbar [[id number]] Collected: 04/17/22 1239    Specimen: Cerebrospinal Fluid from Spine, Lumbar Updated: 04/17/22 1253    Meningitis / Encephalitis Panel, PCR - Cerebrospinal Fluid, Lumbar Puncture [[id number]] Collected: 04/17/22 1239    Specimen: Cerebrospinal Fluid from Lumbar Puncture Updated: 04/17/22 1253    AFB Culture - Cerebrospinal Fluid, Lumbar Puncture [[id number]] Collected: 04/17/22 1239    Specimen: Cerebrospinal Fluid from Lumbar Puncture Updated: 04/17/22 1252    Fungus Culture - Cerebrospinal Fluid, Lumbar Puncture [[id number]] Collected: 04/17/22 1239    Specimen: Cerebrospinal Fluid from Lumbar Puncture Updated: 04/17/22 1252    Urine Culture - Urine, Urine, Catheter In/Out [[id number]]  (Abnormal)  (Susceptibility) Collected: 04/14/22 1025    Specimen: Urine, Catheter In/Out Updated: 04/17/22 1129     Urine Culture >100,000 CFU/mL Klebsiella oxytoca ESBL     Comment: Consider infectious disease consult.  Susceptibility results may not correlate to clinical outcomes.       Susceptibility      Klebsiella oxytoca ESBL      SANDEEP      Amikacin Susceptible      Ertapenem Susceptible      Gentamicin Resistant     Levofloxacin Intermediate      Meropenem Susceptible      Nitrofurantoin Susceptible      Piperacillin + Tazobactam Susceptible      Tobramycin Intermediate      Trimethoprim + Sulfamethoxazole Susceptible                    Linear View                       Imaging Results (Last 24 Hours)     Procedure Component Value Units Date/Time    IR LUMBAR PUNCTURE DIAGNOSTIC [[id number]] Collected: 04/17/22 1300     Updated: 04/17/22 1304     Narrative:      EXAM: IR LUMBAR PUNCTURE DIAGNOSTIC- - 4/17/2022 12:06 PM CDT     HISTORY: unresponsive and abnormal mri; E87.6-Hypokalemia; N39.0-Urinary  tract infection, site not specified; R31.9-Hematuria, unspecified;  R11.2-Nausea with vomiting, unspecified; E87.2-Acidosis;  R13.10-Dysphagia, unspecified; A41.9-Sepsis, unspecified organism;  N39.0-Urinary tract infection, site not specified       COMPARISON: None.      TECHNIQUE: Fluoroscopy-guided lumbar puncture for CSF.  Number of images: 2  Fluoroscopy time: 20 seconds  Dose: 13.8 mGy     PROCEDURE:   After discussing risks, benefits and alternatives of the procedure with  the patient's family, written consent was obtained.      A timeout was performed including the patient's name, date of birth and  procedure to be performed.     The patient was prepped and draped in sterile fashion. 1 percent  lidocaine was administered for local anesthesia.     Under fluoroscopic guidance, a 20-gauge needle was advanced into the  thecal sac at the L3-L4 level. 10 mL of clear fluid was removed. The  patient tolerated the procedure well, and there were no immediate  complications.      The CSF was sent for analysis per referring clinician's orders.     Opening pressure: 13.8 cm of water       Impression:      Successful fluoroscopic-guided lumbar puncture as described. 10 mL clear  CSF sent to the laboratory..  This report was finalized on 04/17/2022 13:00 by Dr. Gomez Cárdenas MD.          Assessment/Plan     Hospital Problem List      Intractable nausea and vomiting    Sepsis secondary to UTI (HCC)    Lactic acidosis    Hypokalemia    Essential (primary) hypertension    UTI (urinary tract infection), bacterial    Malfunction of nephrostomy tube (HCC)    Severe malnutrition (CMS/HCC)    Hypophosphatemia    Impression:  1. Neurological decline/encephalopathy that is progressive.  When I first saw her she was able to withdraw slightly and subtly to noxious stimuli of left  "leg and with withdrawal in all 3 other extremities to noxious stimuli. She's been areflexic at knees but becoming more hyperreflexic of arms and tone can wax and wane some  Typically has been increased in right arm then yesterday both arm and today both arms but barely increased left arm . She was spontaneously opening her eyes but today and yesterday was manually opened.  She held them opened yesterday and today is not so well.  She has never responded to visual threat since I first saw her on 4/16.  EEG went from normal to diffusely slow  .    Now she is not clearing her secretions  2. Abnormal MRI brain  Gyriform restricted diffusion but also seems more posterior involvement as well   Consider cytotoxic injury, She's not had any hypoxic injury, hypoglycemic injury nor hyperammonemia. She has not been hypertensive before the MRI was done.     Plan:    · Bolus another dose of IV Keppra 500 mg IV now and now that renal functions are better increase to 1000 mg every 12 hours in view of episode last night of her being seen \"foaming at the mouth\" and with clenched jaw.  · Will need MRI with  Contrast. I spoke with the radiologist and he would prefer both be done today that is with and without  · Unfortunately she will need to be intubated in order to be supine due to her inability to clear secretions  · MRI with and without  Contrast today  Only had 10 cc of CSF withdrawn yesterday and now has creatinine of 0.92    50  minutes of critical care time was performed ,during this time I consulted with the nursing staff and also with other providers in regard to the patient's care. I attempted to talk  talked with the family about test results. I examined the patient and reviewed labs and in addition spoke with Dr Claudia España at [facility] regarding transfer of patient to . She was very helpful but did not have an ICU bed for this type of patient but this patient will be given a priority when an ICU bed it " available and this may be tomorrow    Abiola Titus MD  04/18/22  09:07 CDT

## 2022-04-18 NOTE — CASE MANAGEMENT/SOCIAL WORK
Continued Stay Note  [facility]     Patient Name: Namita Zabala  MRN: [id number]  Today's Date: 4/18/2022    Admit Date: 4/2/2022     Discharge Plan     Row Name 04/18/22 1018       Plan    Plan unclear/possible acute care transfer    Plan Comments Patient is not on waiting list for [facility].  MARIO spoke with Song at [facility] center this am.  She has actually been declined due to capacity.  Admitting MD is attempting transfer to .  MARIO will assist as needed.               Discharge Codes    No documentation.                     CAMILA Gonzalez

## 2022-04-18 NOTE — CASE MANAGEMENT/SOCIAL WORK
Continued Stay Note  [facility]     Patient Name: Namita Zabala  MRN: [id number]  Today's Date: 4/18/2022    Admit Date: 4/2/2022     Discharge Plan     Row Name 04/18/22 1148       Plan    Plan [facility] with [employer] is wanting peer to peer for discussion of patient care in an effort to work with hospital team. Dr Potter notified.  Contact number of MD for peer to peer to be obtained from [facility] of [facility].  Patient is currently on waiting list at [place], [place]. Dr Potter has been on phone with MD there updating on patient's status and need for tertiary transfer. P[facility] bed avail[facility].  [facility] with [employer] contact number:  [phone]  Ext # [phone]    Row Name 04/18/22 1018       Plan    Plan unclear/possible acute care transfer    Plan Comments Patient is not on waiting list for [facility].  MARIO spoke with Song at [facility] transfer center this am.  She has actually been declined due to capacity.  Admitting MD is attempting transfer to .  MARIO will assist as needed.               Discharge Codes    No documentation.                     Mi Hardy RN

## 2022-04-18 NOTE — PROGRESS NOTES
INFECTIOUS DISEASES PROGRESS NOTE    Patient:  Namita Zabala  YOB: 1975  MRN: [id number]   Admit date: 2022   Admitting Physician: Brody Potter MD  Primary Care Physician: David Lara MD    Chief Complaint: Unobtainable from patient as she is nonverbal        Interval History: Reviewed notes.  Patient underwent lumbar puncture over weekend which was unremarkable.    She was noted to have episode of foaming at the mouth at 1 point per CARLEY Gonzalez.  She has been started on Keppra.  She has had some borderline low blood pressure but has not been started on pressors.    Allergies:   Allergies   Allergen Reactions   • Coconut Unknown - High Severity   • Nuts Unknown - High Severity   • Penicillins    • [place] Other (See Comments)     Causes migraines per pt reports       Current Scheduled Medications:   ertapenem, 1 g, Intravenous, Q24H  famotidine, 20 mg, Intravenous, Q12H  FLUoxetine, 20 mg, Oral, Nightly  fluticasone, 2 spray, Each Nare, BID  folic acid, 1 mg, Oral, Daily  levETIRAcetam, 500 mg, Intravenous, Q12H  neomycin-polymyxin-hydrocortisone, 4 drop, Both Ears, Q6H  sodium bicarbonate, 650 mg, Oral, BID  sodium chloride, 10 mL, Intravenous, Q12H      Current PRN Medications:  hold  •  hydrALAZINE  •  labetalol  •  Morphine  •  ondansetron **OR** ondansetron  •  potassium chloride **OR** potassium chloride **OR** potassium chloride  •  potassium phosphate infusion greater than 15 mMoles **OR** potassium phosphate infusion greater than 15 mMoles **OR** potassium phosphate **OR** sodium phosphate IVPB **OR** sodium phosphate IVPB **OR** sodium phosphate IVPB  •  sodium chloride  •  [COMPLETED] Insert peripheral IV **AND** sodium chloride  •  [COMPLETED] Insert peripheral IV **AND** sodium chloride  •  sodium chloride    Review of Systems   Unable to perform ROS: Patient nonverbal       Objective     Vital Signs:  Temp (24hrs), Av.1 °F (36.7 °C), Min:97.2 °F (36.2  "°C), Max:99 °F (37.2 °C)      BP 93/63   Pulse 109   Temp 99 °F (37.2 °C) (Axillary)   Resp 17   Ht 170.2 cm (67\")   Wt 72.1 kg (159 lb)   SpO2 98%   BMI 24.90 kg/m²         Physical Exam   General: Patient is nontoxic-appearing lying in bed in no acute distress  HEENT: No O2 in place  Neck: Supple  Respiratory: Effort even and unlabored.  Abdomen: Soft, no tenderness elicited.  Mepilex in place over nonhealed abdominal wound.  Fecal management this time in place  Extremities: Increased left thigh edema when compared to the right.  Did not appreciate any ecchymosis anteriorly.  Neuro: Patient was not following any verbal commands.  Psych: Not agitated    Line/IV site: CC femoral right, condition patent and no redness    Results Review:    I reviewed the patient's new clinical results.    Lab Results:  CBC:   Lab Results   Lab 04/13/22  1128 04/14/22  1116 04/15/22  1150 04/16/22  0200 04/16/22  1511 04/17/22  0732 04/17/22  1417 04/18/22  0231   WBC 19.48* 10.54 15.69* 15.70*  --  12.14* 12.54* 11.08*   HEMOGLOBIN 11.1* 9.9* 7.2* 4.8* 9.0* 6.6* 8.8* 7.9*   HEMATOCRIT 32.7* 29.6* 21.4* 15.8* 26.3* 19.4* 25.1* 23.5*   PLATELETS 373 354 347 301  --  161 162 185   LYMPHOCYTE %  --   --   --   --   --   --   --  4.0*   MONOCYTES %  --   --   --   --   --   --   --  5.1     >>>>>>>>>>>> 88.9% neutrophils/4% lymphocytes/5% monocytes/1% eosinophils/1% atypical lymphs    CMP:   Lab Results   Lab 04/16/22  0112 04/16/22  1511 04/17/22  0357 04/18/22  0231   SODIUM 138 143 140 140   POTASSIUM 3.6 3.1* 3.8 3.4*   CHLORIDE 103 108* 104 106   CO2 17.0* 24.0 25.0 25.0   BUN 17 14 11 7   CREATININE 2.25* 1.77* 1.33* 0.92   CALCIUM 7.7* 7.8* 7.5* 7.9*   BILIRUBIN 0.3  --  0.4 0.5   ALK PHOS 70  --  63 74   ALT (SGPT) 12  --  15 17   AST (SGOT) 23  --  28 45*   GLUCOSE 195* 138* 126* 77       Estimated Creatinine Clearance: 88.8 mL/min (by C-G formula based on SCr of 0.92 mg/dL).    Culture Results:    Microbiology Results " (last 10 days)     Procedure Component Value - Date/Time    Culture, CSF - Cerebrospinal Fluid, Lumbar Puncture [[id number]] Collected: 04/17/22 1239    Lab Status: Preliminary result Specimen: Cerebrospinal Fluid from Lumbar Puncture Updated: 04/18/22 0631     CSF Culture No growth     Gram Stain No WBCs or organisms seen    Cryptococcal AG, CSF - Cerebrospinal Fluid, Lumbar Puncture [[id number]]  (Normal) Collected: 04/17/22 1239    Lab Status: Final result Specimen: Cerebrospinal Fluid from Lumbar Puncture Updated: 04/17/22 1403     Cryptococcal Antigen, CSF Negative    Blood Culture - Blood, Arm, Left [[id number]]  (Normal) Collected: 04/16/22 0346    Lab Status: Preliminary result Specimen: Blood from Arm, Left Updated: 04/18/22 0416     Blood Culture No growth at 2 days    Blood Culture - Blood, Arm, Left [[id number]]  (Normal) Collected: 04/16/22 0112    Lab Status: Preliminary result Specimen: Blood from Arm, Left Updated: 04/18/22 0145     Blood Culture No growth at 2 days    Blood Culture With AASHISH - Blood, Arm, Right [[id number]]  (Normal) Collected: 04/14/22 1938    Lab Status: Preliminary result Specimen: Blood from Arm, Right Updated: 04/17/22 2015     Blood Culture No growth at 3 days    Urine Culture - Urine, Urine, Catheter In/Out [[id number]]  (Abnormal)  (Susceptibility) Collected: 04/14/22 1025    Lab Status: Final result Specimen: Urine, Catheter In/Out Updated: 04/17/22 1129     Urine Culture >100,000 CFU/mL Klebsiella oxytoca ESBL     Comment: Consider infectious disease consult.  Susceptibility results may not correlate to clinical outcomes.       Susceptibility      Klebsiella oxytoca ESBL      SANDEEP      Amikacin Susceptible      Ertapenem Susceptible      Gentamicin Resistant     Levofloxacin Intermediate      Meropenem Susceptible      Nitrofurantoin Susceptible      Piperacillin + Tazobactam Susceptible      Tobramycin Intermediate      Trimethoprim + Sulfamethoxazole Susceptible                                       Radiology:   Imaging Results (Last 72 Hours)     Procedure Component Value Units Date/Time    IR LUMBAR PUNCTURE DIAGNOSTIC [[id number]] Collected: 04/17/22 1300     Updated: 04/17/22 1304    Narrative:      EXAM: IR LUMBAR PUNCTURE DIAGNOSTIC- - 4/17/2022 12:06 PM CDT     HISTORY: unresponsive and abnormal mri; E87.6-Hypokalemia; N39.0-Urinary  tract infection, site not specified; R31.9-Hematuria, unspecified;  R11.2-Nausea with vomiting, unspecified; E87.2-Acidosis;  R13.10-Dysphagia, unspecified; A41.9-Sepsis, unspecified organism;  N39.0-Urinary tract infection, site not specified       COMPARISON: None.      TECHNIQUE: Fluoroscopy-guided lumbar puncture for CSF.  Number of images: 2  Fluoroscopy time: 20 seconds  Dose: 13.8 mGy     PROCEDURE:   After discussing risks, benefits and alternatives of the procedure with  the patient's family, written consent was obtained.      A timeout was performed including the patient's name, date of birth and  procedure to be performed.     The patient was prepped and draped in sterile fashion. 1 percent  lidocaine was administered for local anesthesia.     Under fluoroscopic guidance, a 20-gauge needle was advanced into the  thecal sac at the L3-L4 level. 10 mL of clear fluid was removed. The  patient tolerated the procedure well, and there were no immediate  complications.      The CSF was sent for analysis per referring clinician's orders.     Opening pressure: 13.8 cm of water       Impression:      Successful fluoroscopic-guided lumbar puncture as described. 10 mL clear  CSF sent to the laboratory..  This report was finalized on 04/17/2022 13:00 by Dr. Gomez Cárdenas MD.    MRI Brain Without Contrast [[id number]] Collected: 04/16/22 1842     Updated: 04/16/22 1850    Narrative:      EXAMINATION:   MRI BRAIN WO CONTRAST-  4/16/2022 6:42 PM CDT     HISTORY: MRI brain without contrast 4/16/2022 6:00 PM CDT     History: Unresponsive staring acute neuro  change; E87.6-Hypokalemia;  N39.0-Urinary tract infection, site not specified; R31.9-Hematuria,  unspecified; R11.2-Nausea with vomiting, unspecified; E87.2-Acidosis;  R13.10-Dysphagia, unspecified; A41.9-Sepsis, unspecified organism;  N39.0-Urinary tract infection, site not specified     Comparison: None.       Technique: Multiplanar imaging of the brain was performed in a routine  fashion.      Findings:   Midline structures are nondisplaced. Ventricular system is normal. There  is no significant mass effect or hydrocephalus. Basilar cisterns are  preserved. Abnormal restricted diffusion is noted bilaterally in the  occipital lobes.. No abnormal extra axial fluid collections are noted.  Restricted diffusion is noted which is subcortical in the bilateral  parietal cortex. This may be an encephalitis. The FLAIR sequence is not  as intense as the restricted diffusion.     Mild cortical volume loss is noted..      Proximal cervical spinal cord, brainstem, and cerebellum are  unremarkable. Normal cerebrovascular flow voids are seen. Bilateral  globes and orbits are normal in appearance.     No abnormal signal is noted in the mastoid air cells or paranasal  sinuses.        Impression:      Impression:   Abnormal signal with restricted diffusion in the bilateral occipital and  also subcortical bilateral parietal cortex. Most likely this is an  encephalitis.         This report was finalized on 04/16/2022 18:47 by Dr. Nadeem Marshall MD.    CT Head Without Contrast [[id number]] Collected: 04/16/22 1150     Updated: 04/16/22 1156    Narrative:      EXAM/TECHNIQUE: CT HEAD WO CONTRAST-     INDICATION: Mental status change, generalized weakness, dizziness     COMPARISON: 04/11/2022     DLP: 643 mGy cm. Automated exposure control was also utilized to  decrease patient radiation dose.     FINDINGS:     No evidence of acute intracranial hemorrhage. No loss of gray-white  differentiation. No midline shift or mass effect. Lateral  ventricles are  nondilated. Basilar cisterns are patent. No acute orbital finding.  Mastoid air cells are clear. Visualized paranasal sinuses are clear.  Partially imaged nasogastric tube. No acute osseous finding.       Impression:         No acute intracranial findings.  This report was finalized on 04/16/2022 11:53 by Dr. Gomez Cárdenas MD.    XR Abdomen KUB [[id number]] Collected: 04/16/22 1100     Updated: 04/16/22 1105    Narrative:      EXAM: XR ABDOMEN KUB-     INDICATION: NG placement; E87.6-Hypokalemia; N39.0-Urinary tract  infection, site not specified; R31.9-Hematuria, unspecified;  R11.2-Nausea with vomiting, unspecified; E87.2-Acidosis;  R13.10-Dysphagia, unspecified; A41.9-Sepsis, unspecified organism;  N39.0-Urinary tract infection, site not specified     COMPARISON: None available.     FINDINGS:     Nasogastric tube tip projects over the expected location of the gastric  fundus. A left-sided nephrostomy tube/ureteral stent is in place. There  is marked gaseous distention of the stomach, small bowel, and colon.  There is a transverse colon measures up to 8 mm in diameter. Small bowel  loops measure up to 3.37 m in diameter. A RIGHT femoral venous line is  noted.       Impression:         1.  Enteric tube projects over the proximal stomach.  2.  Marked distention of the stomach, small bowel, and colon. Favor  ileus.  This report was finalized on 04/16/2022 11:02 by Dr. Gomez Cárdenas MD.    CT Abdomen Pelvis With Contrast [[id number]] Collected: 04/15/22 2114     Updated: 04/15/22 2146    Addenda:        Filling defect in right lower lobe pulmonary arteries present on image 7  suspicious for pulmonary embolus     Attempts to call extension 2/3/2004 the liver masses were unsuccessful  This report was finalized on 04/15/2022 21:43 by Dr. Nadeem Marshall MD.  Signed: 04/15/22 2143 by Nadeem Marshall MD    Narrative:      EXAMINATION:   CT ABDOMEN PELVIS W CONTRAST-  4/15/2022 9:14 PM CDT     HISTORY: CT  ABDOMEN AND PELVIS WITH CONTRAST 4/15/2022 8:50 PM CDT     HISTORY: Abdominal abscess     COMPARISON: April 7, 2022.      DLP: 1367 mGy cm     TECHNIQUE: Following the intravenous administration of contrast, helical  CT tomographic images of the abdomen and pelvis were acquired. Coronal  reformatted images were also provided for review.      FINDINGS:   In the right lung on axial image 7 of filling defect is present probably  a small pulmonary embolus..      LIVER: No focal liver lesion. The hepatic vasculature is patent.      BILIARY SYSTEM: The gallbladder is unremarkable. No intrahepatic or  extrahepatic ductal dilatation.      PANCREAS: No focal pancreatic lesion.      SPLEEN: Unremarkable.      KIDNEYS AND ADRENALS: Adrenal glands are visualized. Renal contours are  normal in size shape position. A left external percutaneous nephrostomy  internal nephroureterostomy tube is present. The distal tip is  satisfactorily positioned in the bladder.. The ureters are decompressed  and normal in appearance.     RETROPERITONEUM: No mass, lymphadenopathy or hemorrhage.      GI TRACT: Air and fluid is present in the colon. Diarrhea may be  present.. The appendix is visualized and unremarkable.     OTHER: There is no mesenteric mass, lymphadenopathy or fluid collection.  The abdominopelvic vasculature is patent. The osseous structures and  soft tissues demonstrate no worrisome lesions.     PELVIS: A fat-containing left inguinal hernia is present. There is soft  tissue swelling possibly an abscess associated with the left abductor  katie muscle.. The bladder is collapsed around a Mojica catheter..       Impression:      1. Suspicious for abscess in the left abductor katie muscle.  2. Left external/internal percutaneous nephrostomy ureterostomy catheter  is satisfactorily position.  3. Fluid and air is present in the colon. This may be associated with  diarrhea.  4. Mojica catheter is present..         This report was  "finalized on 04/15/2022 21:24 by Dr. Nadeem Marshall MD.          Assessment/Plan     Active Hospital Problems    Diagnosis    • **Intractable nausea and vomiting    • Severe malnutrition (CMS/HCC)    • Hypophosphatemia    • Hypokalemia    • UTI (urinary tract infection), bacterial    • Malfunction of nephrostomy tube (HCC)    • Lactic acidosis    • Sepsis secondary to UTI (HCC)    • Essential (primary) hypertension        IMPRESSION:  1. History of necrotizing urinary tract infection previous admission now with urinary tract infection with ESBL Klebsiella -in and out cath collection per computer.  On ertapenem  2. Leukocytosis-improved   3. Acute blood loss anemia thought secondary to left thigh hematoma-Per report of CT \"suspicious for abscess in the left abductor katie muscle\"  4. Borderline hypotension and some since yesterday.  5. Blood culture with coagulase-negative staph-contaminant    RECOMMENDATION:   · Low threshold to bolus patient if blood pressure low given that she is saturating 99% on room air  · Plan on 7-day course of IV antibiotics for ESBL E. coli urinary tract infection and discontinue  · Monitor H&H closely per attending  · Will review CT with radiology  · Start tube feeding?    Discussed with CARLEY Gonzalez MD  04/18/22  09:13 CDT      "

## 2022-04-18 NOTE — NURSING NOTE
12:52- 5mg Versed given  12:53- 80mg Temo given  12:53- Intubation confirmed with color change and bilateral breath sounds

## 2022-04-18 NOTE — PLAN OF CARE
Goal Outcome Evaluation:              Outcome Evaluation: NTN TF follow up. No TF x 72 hours; discontinued TF order. NGT LIS at time of visit, strict NPO. If AMONS (EN or PN) appropriate with POC, please consult RD for recommendations. NTN following.

## 2022-04-18 NOTE — PROGRESS NOTES
Intubated sedated unresponsive.  Her leg area looks about the same.  There is no erythema or warmth to the touch is a little full.  Her hemoglobin and hematocrit have stabilized.  NG tube has light bilious aspirate.  Abdomen is softly distended without tympany.  Hypoactive bowel sounds.  Patient has ongoing neurologic work-up

## 2022-04-18 NOTE — PROGRESS NOTES
Palliative Care Daily Progress Note   Chief complaint: goals of care/advanced care planning  Code Status and Medical Interventions:   Ordered at: 04/03/22 0411     Level Of Support Discussed With:    Patient     Code Status (Patient has no pulse and is not breathing):    CPR (Attempt to Resuscitate)     Medical Interventions (Patient has pulse or is breathing):    Full Support     Subjective   Medical record reviewed. Events noted. CT of abdomen and pelvis completed on 4/15/2022 revealed probably small pulmonary embolus in right lung, fluid and air in colon, fat-containing left inguinal hernia, and suspicion for abscess in the left abductor katie muscle. She was transferred to the cardiac care unit early morning of 4/16/2022 due to change in neuro status, drop in hemoglobin (4.8) and hematocrit (15.8) and hypotension. Central line inserted and started on levophed drip. Received 2 units of PRBC on 4/16/2022, hemoglobin elysia to 9.0 and hematocrit 26.3 post-transfusion however on 4/17/2022 they both dropped again with hemoglobin 6.6 and hematocrit 19.4. She received an additional unit of blood on 4/17/2022.  X-ray of KUB completed on 4/16/2022 that revealed marked distention of the stomach, small bowel, and colon favoring an ileus. Tube feeds were stopped and she was placed on low intermittent suction to NG. Neurology and general surgery consulted. General surgery has recommended transfer to tertiary care center in regards to possible abscess of left abductor major. A CT of head completed on 4/16/2022 that revealed no acute findings, MRI of brain completed later that evening that revealed abnormal restricted diffusion in bilateral occipital lobe and subcortical bilateral parietal cortex concerning for encephalitis, mild cortical volume loss also visualized.  Lumbar puncture completed on 4/17/2022, cytology results pending. EEG also completed on 4/17/2022 and did not note any definitive seizure activity however it was  abnormal. Slowing thought to be related to a metabolic or medication related encephalopathy. Lab results from this morning reveal potassium 3.4, calcium 7.9, total protein 4.1, AST 45, albumin 1.60, WBC 11.08, RBC 2.75, hemoglobin 7.9 and hematocrit 23.5. Currently lying in bed, does not appear in distress however she is moaning and is having hiccups. Receiving IV potassium replacement, remains hypotensive. Not currently on levophed and unsure how long she has been off as it has not been stopped in EMR. No visitors at bedside. Remain awaiting possible discharge to tertiary [facility].    Advance Care Planning      Advance Care Planning Discussion:  Call placed to spouse, Grant, however unable to reach. Voicemail with call back number has been left. Will plan to follow up later this afternoon or tomorrow morning.     Advance Directive Status: Patient does not have advance directive     Goals of care: Ongoing.    The patient receives support from her spouse and parent.  POA/Healthcare Surrogate - Next of kin is her spouse, Grant.    Review of Systems   Unable to perform ROS: mental status change     Pain Assessment  Nonverbal Indicators of Pain: grimace  CPOT and PAINAD Scales: PAINAD (Pain Assessment in Advance Dementia Scale)  CPOT Facial Expression: 0-->relaxed, neutral  CPOT Body Movements: 0-->absence of movements  CPOT Muscle Tension: 0-->relaxed  Ventilator Compliance/Vocalization: 0-->talking in normal tone or no sound  CPOT Score: 0  PAINAD Breathin-->normal  PAINAD Negative Vocalization: 0-->none  PAINAD Facial Expression: 0-->smiling or inexpressive  PAINAD Body Language: 0-->relaxed  PAINAD Consolability: 0-->no need to console  PAINAD Score: 0  Pain Location: abdomen    Objective   Diagnostics: Reviewed      Intake/Output Summary (Last 24 hours) at 2022 0904  Last data filed at 2022 0800  Gross per 24 hour   Intake 2500 ml   Output 1825 ml   Net 675 ml     Current Facility-Administered  Medications   Medication Dose Route Frequency Provider Last Rate Last Admin   • ertapenem (INVanz) 1 g in sodium chloride 0.9 % 100 mL IVPB-VTB  1 g Intravenous Q24H Amado Villarreal  mL/hr at 04/17/22 1610 1 g at 04/17/22 1610   • famotidine (PEPCID) injection 20 mg  20 mg Intravenous Q12H Amado Villarreal MD   20 mg at 04/18/22 0830   • FLUoxetine (PROzac) capsule 20 mg  20 mg Oral Nightly Amado Villarreal MD   20 mg at 04/17/22 2138   • fluticasone (FLONASE) 50 MCG/ACT nasal spray 2 spray  2 spray Each Nare BID Frank Ricks MD   2 spray at 04/18/22 0831   • folic acid (FOLVITE) tablet 1 mg  1 mg Oral Daily Amado Villarreal MD   1 mg at 04/17/22 2138   • Hold medication  1 each Does not apply Continuous PRN Abiola Yan MD       • hydrALAZINE (APRESOLINE) injection 10 mg  10 mg Intravenous Q4H PRN Amado Villarreal MD       • labetalol (NORMODYNE,TRANDATE) injection 10 mg  10 mg Intravenous Q4H PRN Amado Villarreal MD   10 mg at 04/10/22 1518   • lactated ringers infusion  75 mL/hr Intravenous Continuous Amado Villarreal MD 75 mL/hr at 04/18/22 0048 75 mL/hr at 04/18/22 0048   • levETIRAcetam in NaCl 0.82% (KEPPRA) IVPB 500 mg  500 mg Intravenous Q12H Abiola Yan MD   500 mg at 04/18/22 0830   • Morphine sulfate (PF) injection 2 mg  2 mg Intravenous Q4H PRN Amado Villarreal MD       • neomycin-polymyxin-hydrocortisone (CORTISPORIN) otic suspension 4 drop  4 drop Both Ears Q6H Fermin Mcclure DO   4 drop at 04/18/22 0543   • norepinephrine (LEVOPHED) 8 mg in 250 mL NS infusion (premix)  0.02-0.3 mcg/kg/min Intravenous Titrated Rajwinder Amaya APRN 4.91 mL/hr at 04/16/22 0645 0.04 mcg/kg/min at 04/16/22 0645   • ondansetron (ZOFRAN) tablet 4 mg  4 mg Oral Q6H PRN Frank Ricks MD        Or   • ondansetron (ZOFRAN) injection 4 mg  4 mg Intravenous Q6H PRN Frank Ricks MD   4 mg at 04/16/22 1000   • potassium chloride (MICRO-K) CR capsule 40 mEq  40 mEq Oral PRN Amado Villarreal  MD NATHALIE        Or   • potassium chloride (KLOR-CON) packet 40 mEq  40 mEq Oral PRN Amado Villarreal MD        Or   • potassium chloride 10 mEq in 100 mL IVPB  10 mEq Intravenous Q1H PRN Amado Villarreal  mL/hr at 04/18/22 0839 10 mEq at 04/18/22 0839   • potassium phosphate 45 mmol in sodium chloride 0.9 % 500 mL infusion  45 mmol Intravenous PRN Aamdo Villarreal MD        Or   • potassium phosphate 30 mmol in sodium chloride 0.9 % 250 mL infusion  30 mmol Intravenous PRN Amado Villarreal MD 31.3 mL/hr at 04/16/22 1900 30 mmol at 04/16/22 1900    Or   • Potassium Phosphates 15 mmol in sodium chloride 0.9 % 100 mL infusion  15 mmol Intravenous PRN Amado Villarreal MD        Or   • sodium phosphates 45 mmol in sodium chloride 0.9 % 500 mL IVPB  45 mmol Intravenous PRN Amado Villarreal MD        Or   • sodium phosphates 30 mmol in sodium chloride 0.9 % 250 mL IVPB  30 mmol Intravenous PRN Amado Villarreal MD        Or   • sodium phosphates 15 mmol in sodium chloride 0.9 % 250 mL IVPB  15 mmol Intravenous PRN Amado Villarreal MD       • sodium bicarbonate tablet 650 mg  650 mg Oral BID Amado Villarreal MD   650 mg at 04/18/22 0830   • sodium chloride 0.9 % flush 10 mL  10 mL Intravenous PRN Frank Ricks MD       • sodium chloride 0.9 % flush 10 mL  10 mL Intravenous PRN Frank Ricks MD       • sodium chloride 0.9 % flush 10 mL  10 mL Intravenous PRN Frank Ricks MD       • sodium chloride 0.9 % flush 10 mL  10 mL Intravenous Q12H Frank Ricks MD   10 mL at 04/18/22 0830   • sodium chloride 0.9 % flush 10 mL  10 mL Intravenous PRN Frank Ricks MD   10 mL at 04/10/22 1518     hold, 1 each  lactated ringers, 75 mL/hr, Last Rate: 75 mL/hr (04/18/22 0048)  norepinephrine, 0.02-0.3 mcg/kg/min, Last Rate: 0.04 mcg/kg/min (04/16/22 0645)      hold  •  hydrALAZINE  •  labetalol  •  Morphine  •  ondansetron **OR** ondansetron  •  potassium chloride **OR** potassium chloride **OR** potassium chloride  •   "potassium phosphate infusion greater than 15 mMoles **OR** potassium phosphate infusion greater than 15 mMoles **OR** potassium phosphate **OR** sodium phosphate IVPB **OR** sodium phosphate IVPB **OR** sodium phosphate IVPB  •  sodium chloride  •  [COMPLETED] Insert peripheral IV **AND** sodium chloride  •  [COMPLETED] Insert peripheral IV **AND** sodium chloride  •  sodium chloride    Assessment:  Vital Signs: BP 93/63   Pulse 109   Temp 99 °F (37.2 °C) (Axillary)   Resp 17   Ht 170.2 cm (67\")   Wt 72.1 kg (159 lb)   SpO2 98%   BMI 24.90 kg/m²     Physical Exam  Vitals and nursing note reviewed.   Constitutional:       General: She is not in acute distress.     Appearance: She is ill-appearing.   HENT:      Head: Normocephalic and atraumatic.      Nose:      Comments: NG right nostril  Eyes:      General: Lids are normal.   Neck:      Vascular: No JVD.   Cardiovascular:      Rate and Rhythm: Tachycardia present.      Heart sounds: Normal heart sounds.   Pulmonary:      Effort: Pulmonary effort is normal.      Breath sounds: Decreased breath sounds present.   Abdominal:      Palpations: Abdomen is soft.   Genitourinary:     Comments: Mojica catheter and left sided nephrostomy tube. Fecal management tube.  Musculoskeletal:      Cervical back: Neck supple.   Skin:     General: Skin is warm and dry.      Coloration: Skin is pale.   Neurological:      Motor: Weakness and abnormal muscle tone present.      Comments: CARLI as she is unable to answer questions or follow commands.   Psychiatric:         Cognition and Memory: Cognition is impaired.      Comments: CARLI as she is unable to answer questions or follow commands.     Patient status: Disease state: Deteriorating despite treatments.  Functional status: Palliative Performance Scale Score: Performance 10% based on the following measures: Ambulation: Totally bed bound, Activity and Evidence of Disease: Unable to do any work, extensive evidence of disease, " Self-Care: Total care required,  Intake: Mouth care only, LOC: Drowsy or comatose. Nutritional status: Albumin 1.60.Body mass index is 24.9 kg/m².     Impression/Problem List:  1.    Altered mental status  2.    Impaired mobility and ability to perform activities of daily living  3.    Severe malnutrition  4.    Urinary tract infection  5.    Sepsis secondary to UTI  6.    Hypotension   7.    Malfunctioning nephrostomy tube  8.    Lactic acidosis  9.    Impaired functioning of gallbladder (sludge-filled and 20% EF per imaging)  10.  Hepatic steatosis  11.  Anemia  12.  Leukocytosis, improving   13.  Pleural effusion, left  14.  Folate deficiency  15.  Anxiety  16.  Ileus   17.  Dysphagia    18.  Intractable nausea and vomiting, improved  19.  History of COVID-19 (August 2021)  20.  Presence of NG  21.  Abnormal MRI    22.  Abnormal EEG  23.  Hypokalemia  24.  Hypocalcemia  25.  Elevated AST    Plans/Recommendations     1. Goals of care include CODE STATUS CPR with full support interventions.    2. Palliative care encounter  - Prognosis is guarded secondary to altered mental status with abnormal MRI and EEG, impaired mobility and ability to perform activities of daily living, severe malnutrition, urinary tract infection, sepsis, hypotension, malfunctioning nephrostomy tube, anemia, ileus, dysphagia, hypokalemia, elevated AST, hepatic steatosis, suspicion for abscess of left abductor major and other comorbidities listed above.  - Attempted to speak to patient's spouse, Grant, however unable to reach. Voicemail left with call back number. Will plan to follow up later this afternoon or tomorrow morning.   - Remain awaiting possible transfer to tertiary care center.   - Will plan to follow up tomorrow or sooner if needed.    ADDENDUM:  - Spoke to patient's spouse, Grant. Reports he is appreciative of call and that he is hopeful she will be accepted at  or [facility]. States he has been in contact with nursing to receive  updates and wishes to continue CPR and full support interventions at this time.         Thank you for allowing us to participate in patient's plan of care. Palliative Care Team will continue to follow patient.     Time spent: 30 minutes spent reviewing medical and medication records, assessing and examining patient, discussing with family, answering questions, providing some guidance about a plan and documentation of care, and coordinating care with other healthcare members, with > 50% time spent face to face.       Nneka Barton, APRN  4/18/2022

## 2022-04-18 NOTE — PAYOR COMM NOTE
"AUTH: [id number]    PATIENT MOVED TO CCU ON 4/16/22    Namita Cooper (46 y.o. Female)             Date of Birth   1975    Social Security Number       Address   [address] [place] [address]    Home Phone   [phone]    MRN   [id number]       [facility]    Marital Status                               Admission Date   4/2/22    Admission Type   Emergency    Admitting Provider   Brody Potter MD    Attending Provider   Brody Potter MD    Department, Room/Bed   Psychiatric CARDIAC CARE, C001/1       Discharge Date       Discharge Disposition       Discharge Destination                               Attending Provider: Brody Potter MD    Allergies: Coconut, Nuts, Penicillins, [place]    Isolation: Contact   Infection: COVID (History) (09/15/21), ESBL Klebsiella (04/07/22)   Code Status: CPR   Advance Care Planning Activity    Ht: 170.2 cm (67\")   Wt: 72.1 kg (159 lb)    Admission Cmt: None   Principal Problem: Intractable nausea and vomiting [R11.2]                 Active Insurance as of 4/2/2022     Primary Coverage     Payor Plan Insurance Group Employer/Plan Group    ANTHEM BLUE CROSS ANTHEM BLUE CROSS BLUE SHIELD PPO [id number]     Payor Plan Address Payor Plan Phone Number Payor Plan Fax Number Effective Dates    [address] [phone]  1/1/2022 - None Entered    [facility] [id number]       Subscriber Name Subscriber Birth Date Member ID       BRANDON COOPER GEMA 1975 [id number]           Secondary Coverage     Payor Plan Insurance Group Employer/Plan Group    MEDICARE MEDICARE A & B      Payor Plan Address Payor Plan Phone Number Payor Plan Fax Number Effective Dates    [address] [phone]  7/1/2019 - None Entered    [facility] [id number]       Subscriber Name Subscriber Birth Date Member ID       NAMITA COOPER N 1975 [id number]                 Emergency Contacts      (Rel.) Home Phone Work Phone " Mobile Phone    Grant Zabala (Spouse) -- -- [phone]    Noel Johnson (Father) [phone] -- [phone]    Marquita Johnson (Mother) -- -- [phone]    Neida Zabala -- -- [phone]            Blood Administration Record (From admission, onward)    Completed transfusions     Ordered     Start    04/17/22 0834  Transfuse RBC  Transfusion        Released Time Blood Unit Number Status   04/17/22 0905   22  [id number]  [id number] Completed 04/17/22 1132       04/17/22 0834    04/16/22 0236  Transfuse RBC, 2 Units  Transfusion      Released Time Blood Unit Number Status   04/16/22 0546   22  [id number]  [id number] Completed 04/16/22 0754   04/16/22 0749   22  [id number]  [id number] Completed 04/16/22 1154       04/16/22 0235                   Physician Progress Notes (last 24 hours)      Vijay Gamble MD at 04/17/22 1213          Infectious Diseases Progress Note    Patient:  Namita Zabala  YOB: 1975  MRN: [id number]   Admit date: 4/2/2022   Admitting Physician: Amado Villarreal MD  Primary Care Physician: David Lara MD    Chief Complaint/Interval History: Saw her in radiology while she was getting LP.  She was more verbal and responsive than yesterday.  Nursing indicates she is moving all extremities.  Fluoroscopic guided LP was successful.  Clear fluid obtained.  She is without fever.  She is hemodynamically stable. MRI obtained yesterday suggested the possibility of encephalitis.  Would seem atypical to have encephalitis without significant fever.    Intake/Output Summary (Last 24 hours) at 4/17/2022 1213  Last data filed at 4/17/2022 1132  Gross per 24 hour   Intake 3299.21 ml   Output 1775 ml   Net 1524.21 ml     Allergies:   Allergies   Allergen Reactions   • Coconut Unknown - High Severity   • Nuts Unknown - High Severity   • Penicillins    • [place] Other (See Comments)     Causes migraines per pt reports     Current Scheduled Medications:   ertapenem, 1 g,  "Intravenous, Q24H  famotidine, 20 mg, Intravenous, Daily  FLUoxetine, 20 mg, Oral, Nightly  fluticasone, 2 spray, Each Nare, BID  folic acid, 1 mg, Oral, Daily  neomycin-polymyxin-hydrocortisone, 4 drop, Both Ears, Q6H  oxymetazoline, 2 spray, Nasal, BID  sodium bicarbonate, 650 mg, Oral, BID  sodium chloride, 10 mL, Intravenous, Q12H      Current PRN Medications:  hold  •  hydrALAZINE  •  labetalol  •  ondansetron **OR** ondansetron  •  potassium chloride **OR** potassium chloride **OR** potassium chloride  •  potassium phosphate infusion greater than 15 mMoles **OR** potassium phosphate infusion greater than 15 mMoles **OR** potassium phosphate **OR** sodium phosphate IVPB **OR** sodium phosphate IVPB **OR** sodium phosphate IVPB  •  sodium chloride  •  [COMPLETED] Insert peripheral IV **AND** sodium chloride  •  [COMPLETED] Insert peripheral IV **AND** sodium chloride  •  sodium chloride    Review of Systems Unobtainable from patient due to sedation and mechanical ventilation    Vital Signs:  BP 95/66   Pulse 102   Temp 98.2 °F (36.8 °C) (Axillary)   Resp 14   Ht 170.2 cm (67\")   Wt 70.7 kg (155 lb 12.8 oz)   SpO2 100%   BMI 24.40 kg/m²     Physical Exam  Vital signs - reviewed.  Line/IV site - No erythema, warmth, induration, or tenderness.  Lungs without crackles  Skin without rash  When I saw her in radiology she was supine for lumbar puncture.  Per review of notes she has had some swelling in the left groin area consistent with possible hematoma.  Lab Results:  CBC:   Results from last 7 days   Lab Units 04/17/22  0732 04/16/22  1511 04/16/22  0200 04/15/22  1150 04/14/22  1116 04/13/22  1128 04/12/22  0622 04/11/22  0752   WBC 10*3/mm3 12.14*  --  15.70* 15.69* 10.54 19.48* 8.15 8.88   HEMOGLOBIN g/dL 6.6* 9.0* 4.8* 7.2* 9.9* 11.1* 10.1* 9.7*   HEMATOCRIT % 19.4* 26.3* 15.8* 21.4* 29.6* 32.7* 30.9* 28.5*   PLATELETS 10*3/mm3 161  --  301 347 354 373 621 053     BMP:  Results from last 7 days   Lab " Units 04/17/22  0357 04/16/22  1511 04/16/22  0112 04/15/22  2233 04/14/22  1116 04/13/22  0725 04/12/22  0622 04/11/22  0752   SODIUM mmol/L 140 143 138 137 137 134* 141 139   POTASSIUM mmol/L 3.8 3.1* 3.6 3.6 3.4* 3.8 3.9 3.3*   CHLORIDE mmol/L 104 108* 103 102 99 99 105 106   CO2 mmol/L 25.0 24.0 17.0* 21.0* 25.0 18.0* 24.0 23.0   BUN mg/dL 11 14 17 16 7 8 7 7   CREATININE mg/dL 1.33* 1.77* 2.25* 2.15* 0.72 0.70 0.71 0.70   GLUCOSE mg/dL 126* 138* 195* 137* 110* 83 79 102*   CALCIUM mg/dL 7.5* 7.8* 7.7* 7.9* 8.8 8.9 8.6 8.3*   ALT (SGPT) U/L 15  --  12 11 13  --   --  9     CSF protein 35  CSF red blood cell count 7  CSF nucleated cell count 1  Cryptococcal antigen negative    Culture Results:   Blood Culture   Date Value Ref Range Status   04/16/2022 No growth at 24 hours  Preliminary   04/16/2022 No growth at 24 hours  Preliminary   04/14/2022 No growth at 2 days  Preliminary     Urine Culture   Date Value Ref Range Status   04/14/2022 >100,000 CFU/mL Klebsiella oxytoca ESBL (A)  Final     Comment:     Consider infectious disease consult.  Susceptibility results may not correlate to clinical outcomes.     Radiology:     MRI of brain and brainstem done April 16, 2022:  IMPRESSION:  Impression:   Abnormal signal with restricted diffusion in the bilateral occipital and  also subcortical bilateral parietal cortex. Most likely this is an  encephalitis.   This report was finalized on 04/16/2022 18:47 by Dr. Nadeem Marshall MD.  (Personally reviewed above MRI with radiology.  Feel differential diagnosis larger than just encephalitis.  Per discussion with radiology, ischemia, encephalitis, prion disease, could all present with above findings)    Additional Studies Reviewed: None    Impression:   1. Change in mental status.  MRI may be consistent with ischemia.  Hard to tell for sure.  She had had hypotension yesterday morning and also had had a drop in hemoglobin.  Possible ischemic injury from hypotension.  2.  She had  had intractable nausea vomiting-nursing indicates she had some emesis when placed supine for LP, did not report emesis prior to that time.  She had been on NG to intermittent suction.  3.  Drop in hemoglobin yesterday-Per review of notes may have had spontaneous bleeding possibly related to anticoagulation.  4.  UTI secondary to ESBL positive Klebsiella-on treatment with ertapenem  5.  Poss blood culture-likely contaminant  6.  Acute renal dysfunction-improving    Recommendations:   Continue ertapenem  Continue off vancomycin  CSF parameters not suggestive of encephalitis or other CNS inflammatory process-do not feel antimicrobial coverage needs to be expanded  No new recommendations from ID at present    Vijay Gamble MD    Electronically signed by Vijay Gamble MD at 04/17/22 1535     Abiola Yan MD at 04/17/22 0944            Neurology Progress Note      Date of admission: 4/2/2022  8:46 PM  Date of visit: 4/17/2022    Chief Complaint:  F/u AMS    Subjective     Subjective:  Remains unresponsive nonverbal except moaning at times and will withdraw from noxious stimuli  Does not respond to visual threat      Hemoglobin only 6.6 but renal functions are improving  Medications:  Current Facility-Administered Medications   Medication Dose Route Frequency Provider Last Rate Last Admin   • ertapenem (INVanz) 1 g in sodium chloride 0.9 % 100 mL IVPB-VTB  1 g Intravenous Q24H Amado Villarreal  mL/hr at 04/16/22 1859 1 g at 04/16/22 1859   • famotidine (PEPCID) injection 20 mg  20 mg Intravenous Daily Amado Villarreal MD   20 mg at 04/17/22 0826   • FLUoxetine (PROzac) capsule 20 mg  20 mg Oral Nightly Amado Villarreal MD   20 mg at 04/14/22 2200   • fluticasone (FLONASE) 50 MCG/ACT nasal spray 2 spray  2 spray Each Nare BID Frank Ricks MD   2 spray at 04/17/22 0826   • folic acid (FOLVITE) tablet 1 mg  1 mg Oral Daily Amado Villarreal MD   1 mg at 04/14/22 2200   • hydrALAZINE (APRESOLINE)  injection 10 mg  10 mg Intravenous Q4H PRN Amado Villarreal MD       • labetalol (NORMODYNE,TRANDATE) injection 10 mg  10 mg Intravenous Q4H PRN Amado Villarreal MD   10 mg at 04/10/22 1518   • lactated ringers infusion  100 mL/hr Intravenous Continuous Amado Villarreal MD       • neomycin-polymyxin-hydrocortisone (CORTISPORIN) otic suspension 4 drop  4 drop Both Ears Q6H Fermin Mcclure DO   4 drop at 04/17/22 0600   • norepinephrine (LEVOPHED) 8 mg in 250 mL NS infusion (premix)  0.02-0.3 mcg/kg/min Intravenous Titrated Rajwinder Amaya APRN 4.91 mL/hr at 04/16/22 0645 0.04 mcg/kg/min at 04/16/22 0645   • ondansetron (ZOFRAN) tablet 4 mg  4 mg Oral Q6H PRN Frank Ricks MD        Or   • ondansetron (ZOFRAN) injection 4 mg  4 mg Intravenous Q6H PRN Frank Ricks MD   4 mg at 04/16/22 1000   • oxymetazoline (AFRIN) nasal spray 2 spray  2 spray Nasal BID Frank Ricks MD   2 spray at 04/17/22 0826   • potassium chloride (MICRO-K) CR capsule 40 mEq  40 mEq Oral PRN Amado Villarreal MD        Or   • potassium chloride (KLOR-CON) packet 40 mEq  40 mEq Oral PRN Amado Villarreal MD        Or   • potassium chloride 10 mEq in 100 mL IVPB  10 mEq Intravenous Q1H PRN Amado Villarreal  mL/hr at 04/17/22 0121 10 mEq at 04/17/22 0121   • potassium phosphate 45 mmol in sodium chloride 0.9 % 500 mL infusion  45 mmol Intravenous PRN Amado Villarreal MD        Or   • potassium phosphate 30 mmol in sodium chloride 0.9 % 250 mL infusion  30 mmol Intravenous PRN Amado Villarreal MD 31.3 mL/hr at 04/16/22 1900 30 mmol at 04/16/22 1900    Or   • Potassium Phosphates 15 mmol in sodium chloride 0.9 % 100 mL infusion  15 mmol Intravenous PRN Amado Villarreal MD        Or   • sodium phosphates 45 mmol in sodium chloride 0.9 % 500 mL IVPB  45 mmol Intravenous PRN Amado Villarreal MD        Or   • sodium phosphates 30 mmol in sodium chloride 0.9 % 250 mL IVPB  30 mmol Intravenous PRN Amado Villarreal MD        Or   • sodium  phosphates 15 mmol in sodium chloride 0.9 % 250 mL IVPB  15 mmol Intravenous PRN Amado Villarreal MD       • sodium bicarbonate tablet 650 mg  650 mg Oral BID Amado Villarreal MD   650 mg at 04/17/22 0826   • sodium chloride 0.9 % flush 10 mL  10 mL Intravenous PRN Frank Ricks MD       • sodium chloride 0.9 % flush 10 mL  10 mL Intravenous PRN Frank Ricks MD       • sodium chloride 0.9 % flush 10 mL  10 mL Intravenous PRN Frank Ricks MD       • sodium chloride 0.9 % flush 10 mL  10 mL Intravenous Q12H Frank Ricks MD   10 mL at 04/17/22 0826   • sodium chloride 0.9 % flush 10 mL  10 mL Intravenous PRN Frank Ricks MD   10 mL at 04/10/22 1518   • SUMAtriptan (IMITREX) tablet 50 mg  50 mg Oral Once Frank Ricks MD           Review of Systems:   -A 14 point review of systems is unobtainable    Objective     Objective      Vital Signs  Temp:  [97.3 °F (36.3 °C)-98.4 °F (36.9 °C)] 98.4 °F (36.9 °C)  Heart Rate:  [] 96  Resp:  [14] 14  BP: ()/() 99/64    Physical Exam:    HEENT:  Neck supple  CVS:  Regular rate and rhythm.  No murmurs  Carotid Examination:  No bruits  Lungs:  Clear to auscultation  Abdomen:  Non-tender, Non-distended  Extremities:  No signs of peripheral edema    Neurologic Exam:    -opens eyes Will moan. Can grimace unable to communicate      Cranial nerves II through XII intact.  Pupils react  No response to visual threat  Eyes conjugate but only in ortho position  EEG ongoing so I'm not wanting to manually turn her head  No facial motor asymmetry but not moving lower face much  Does hear as will open her eyes to her name called    Motor: (strength out of 5:  1= minimal movement, 2 = movement in plane of gravity, 3 = movement against gravity, 4 = movement against some resistance, 5 = full strength)    Increased tone in all 4 extremities    DTR:  2+ throughout in all four extremities    Sensory:  -Intact to light touch, pinprick, temperature, pain,  and proprioception    Coordination/Gait:  -No ataxia     Results Review:    I reviewed the patient's new clinical results.    Lab Results (last 24 hours)     Procedure Component Value Units Date/Time    CBC & Differential [[id number]]  (Abnormal) Collected: 04/17/22 0732    Specimen: Blood Updated: 04/17/22 0813    Narrative:      The following orders were created for panel order CBC & Differential.  Procedure                               Abnormality         Status                     ---------                               -----------         ------                     CBC Auto Differential[[id number]]        Abnormal            Final result                 Please view results for these tests on the individual orders.    CBC Auto Differential [[id number]]  (Abnormal) Collected: 04/17/22 0732    Specimen: Blood Updated: 04/17/22 0813     WBC 12.14 10*3/mm3      RBC 2.32 10*6/mm3      Hemoglobin 6.6 g/dL      Hematocrit 19.4 %      MCV 83.6 fL      MCH 28.4 pg      MCHC 34.0 g/dL      RDW 14.6 %      RDW-SD 44.3 fl      MPV 9.6 fL      Platelets 161 10*3/mm3      Neutrophil % 66.7 %      Lymphocyte % 17.6 %      Monocyte % 10.5 %      Eosinophil % 1.5 %      Basophil % 0.2 %      Immature Grans % 3.5 %      Neutrophils, Absolute 8.08 10*3/mm3      Lymphocytes, Absolute 2.14 10*3/mm3      Monocytes, Absolute 1.28 10*3/mm3      Eosinophils, Absolute 0.18 10*3/mm3      Basophils, Absolute 0.03 10*3/mm3      Immature Grans, Absolute 0.43 10*3/mm3      nRBC 0.4 /100 WBC     Narrative:      ckd with  Recollect     Phosphorus [[id number]]  (Normal) Collected: 04/17/22 0357    Specimen: Blood Updated: 04/17/22 0425     Phosphorus 3.1 mg/dL     Magnesium [[id number]]  (Normal) Collected: 04/17/22 0357    Specimen: Blood Updated: 04/17/22 0425     Magnesium 1.9 mg/dL     Comprehensive Metabolic Panel [[id number]]  (Abnormal) Collected: 04/17/22 0357    Specimen: Blood Updated: 04/17/22 0424     Glucose 126 mg/dL      BUN 11 mg/dL       Creatinine 1.33 mg/dL      Sodium 140 mmol/L      Potassium 3.8 mmol/L      Chloride 104 mmol/L      CO2 25.0 mmol/L      Calcium 7.5 mg/dL      Total Protein 4.0 g/dL      Albumin 1.80 g/dL      ALT (SGPT) 15 U/L      AST (SGOT) 28 U/L      Alkaline Phosphatase 63 U/L      Total Bilirubin 0.4 mg/dL      Globulin 2.2 gm/dL      A/G Ratio 0.8 g/dL      BUN/Creatinine Ratio 8.3     Anion Gap 11.0 mmol/L      eGFR 50.7 mL/min/1.73      Comment: National Kidney Foundation and American Society of Nephrology (ASN) Task Force recommended calculation based on the Chronic Kidney Disease Epidemiology Collaboration (CKD-EPI) equation refit without adjustment for race.       Narrative:      GFR Normal >60  Chronic Kidney Disease <60  Kidney Failure <15      Blood Culture - Blood, Arm, Left [[id number]]  (Normal) Collected: 04/16/22 0346    Specimen: Blood from Arm, Left Updated: 04/17/22 0417     Blood Culture No growth at 24 hours    Protime-INR [[id number]]  (Abnormal) Collected: 04/17/22 0357    Specimen: Blood Updated: 04/17/22 0416     Protime 16.3 Seconds      INR 1.37    aPTT [[id number]]  (Abnormal) Collected: 04/17/22 0357    Specimen: Blood Updated: 04/17/22 0416     PTT 36.7 seconds     Blood Culture - Blood, Arm, Left [[id number]]  (Normal) Collected: 04/16/22 0112    Specimen: Blood from Arm, Left Updated: 04/17/22 0147     Blood Culture No growth at 24 hours    Blood Culture With AASHISH - Blood, Arm, Right [[id number]]  (Normal) Collected: 04/14/22 1938    Specimen: Blood from Arm, Right Updated: 04/16/22 2017     Blood Culture No growth at 2 days    Phosphorus [[id number]]  (Abnormal) Collected: 04/16/22 1511    Specimen: Blood Updated: 04/16/22 1554     Phosphorus 1.4 mg/dL     Basic Metabolic Panel [[id number]]  (Abnormal) Collected: 04/16/22 1511    Specimen: Blood Updated: 04/16/22 1543     Glucose 138 mg/dL      BUN 14 mg/dL      Creatinine 1.77 mg/dL      Sodium 143 mmol/L      Potassium 3.1 mmol/L      Chloride  108 mmol/L      CO2 24.0 mmol/L      Calcium 7.8 mg/dL      BUN/Creatinine Ratio 7.9     Anion Gap 11.0 mmol/L      eGFR 36.0 mL/min/1.73      Comment: National Kidney Foundation and American Society of Nephrology (ASN) Task Force recommended calculation based on the Chronic Kidney Disease Epidemiology Collaboration (CKD-EPI) equation refit without adjustment for race.       Narrative:      GFR Normal >60  Chronic Kidney Disease <60  Kidney Failure <15      Magnesium [[id number]]  (Abnormal) Collected: 04/16/22 1511    Specimen: Blood Updated: 04/16/22 1537     Magnesium 1.5 mg/dL     Hemoglobin & Hematocrit, Blood [[id number]]  (Abnormal) Collected: 04/16/22 1511    Specimen: Blood Updated: 04/16/22 1524     Hemoglobin 9.0 g/dL      Hematocrit 26.3 %         Imaging Results (Last 24 Hours)     Procedure Component Value Units Date/Time    MRI Brain Without Contrast [[id number]] Collected: 04/16/22 1842     Updated: 04/16/22 1850    Narrative:      EXAMINATION:   MRI BRAIN WO CONTRAST-  4/16/2022 6:42 PM CDT     HISTORY: MRI brain without contrast 4/16/2022 6:00 PM CDT     History: Unresponsive staring acute neuro change; E87.6-Hypokalemia;  N39.0-Urinary tract infection, site not specified; R31.9-Hematuria,  unspecified; R11.2-Nausea with vomiting, unspecified; E87.2-Acidosis;  R13.10-Dysphagia, unspecified; A41.9-Sepsis, unspecified organism;  N39.0-Urinary tract infection, site not specified     Comparison: None.       Technique: Multiplanar imaging of the brain was performed in a routine  fashion.      Findings:   Midline structures are nondisplaced. Ventricular system is normal. There  is no significant mass effect or hydrocephalus. Basilar cisterns are  preserved. Abnormal restricted diffusion is noted bilaterally in the  occipital lobes.. No abnormal extra axial fluid collections are noted.  Restricted diffusion is noted which is subcortical in the bilateral  parietal cortex. This may be an encephalitis. The  FLAIR sequence is not  as intense as the restricted diffusion.     Mild cortical volume loss is noted..      Proximal cervical spinal cord, brainstem, and cerebellum are  unremarkable. Normal cerebrovascular flow voids are seen. Bilateral  globes and orbits are normal in appearance.     No abnormal signal is noted in the mastoid air cells or paranasal  sinuses.        Impression:      Impression:   Abnormal signal with restricted diffusion in the bilateral occipital and  also subcortical bilateral parietal cortex. Most likely this is an  encephalitis.         This report was finalized on 04/16/2022 18:47 by Dr. Nadeem Marshall MD.    CT Head Without Contrast [[id number]] Collected: 04/16/22 1150     Updated: 04/16/22 1156    Narrative:      EXAM/TECHNIQUE: CT HEAD WO CONTRAST-     INDICATION: Mental status change, generalized weakness, dizziness     COMPARISON: 04/11/2022     DLP: 643 mGy cm. Automated exposure control was also utilized to  decrease patient radiation dose.     FINDINGS:     No evidence of acute intracranial hemorrhage. No loss of gray-white  differentiation. No midline shift or mass effect. Lateral ventricles are  nondilated. Basilar cisterns are patent. No acute orbital finding.  Mastoid air cells are clear. Visualized paranasal sinuses are clear.  Partially imaged nasogastric tube. No acute osseous finding.       Impression:         No acute intracranial findings.  This report was finalized on 04/16/2022 11:53 by Dr. Gomez Cárdenas MD.    XR Abdomen KUB [[id number]] Collected: 04/16/22 1100     Updated: 04/16/22 1105    Narrative:      EXAM: XR ABDOMEN KUB-     INDICATION: NG placement; E87.6-Hypokalemia; N39.0-Urinary tract  infection, site not specified; R31.9-Hematuria, unspecified;  R11.2-Nausea with vomiting, unspecified; E87.2-Acidosis;  R13.10-Dysphagia, unspecified; A41.9-Sepsis, unspecified organism;  N39.0-Urinary tract infection, site not specified     COMPARISON: None available.      FINDINGS:     Nasogastric tube tip projects over the expected location of the gastric  fundus. A left-sided nephrostomy tube/ureteral stent is in place. There  is marked gaseous distention of the stomach, small bowel, and colon.  There is a transverse colon measures up to 8 mm in diameter. Small bowel  loops measure up to 3.37 m in diameter. A RIGHT femoral venous line is  noted.       Impression:         1.  Enteric tube projects over the proximal stomach.  2.  Marked distention of the stomach, small bowel, and colon. Favor  ileus.  This report was finalized on 04/16/2022 11:02 by Dr. Gomez Cárdenas MD.        MRI of brain was done without contrast due to renal functions (tech would not do) This shows mostly abnormalities on DWI which could be seen from seizure induced cytotoxic injury, , hypoglycemia, hyperammonemia, hypoxic injury  To my knowledge she has not had any of these unless an unwitnessed seizure or subclinical seizures   Assessment/Plan     Hospital Problem List      Intractable nausea and vomiting    Sepsis secondary to UTI (HCC)    Lactic acidosis    Hypokalemia    Essential (primary) hypertension    UTI (urinary tract infection), bacterial    Malfunction of nephrostomy tube (HCC)    Severe malnutrition (CMS/HCC)    Hypophosphatemia    Impression:  1. Unresponsive and with that abnormality on MRI--Cannot exclude subclinical seizures as nursing did not report any seizure like activity  Currently she has marked increased tone but not jerking  2. Anemia--Xarelto helddue to reoccurrence and uncler etiology? Hematoma left groin/thigh  3. Renal failure, improving    Plan:  · EEG stat  · LP today STAT  · Empiric trial of Keppra IV after EEG  · Consider MRI with contrast but wait--would not do today as she will have an LP and we may not need.       45 minutes of critical care time was performed ,during this time I consulted with the nursing staff and also with other providers in regard to the patient's  care. I talked with the family about test results,in addition to the radiologist and Dr Villarreal  I examined the patient and looked at ongoing EEG which just showed diffuse slowing based on rapid interpretation of the ongoing EEG.  Full final report to follow when EEG completed      Abiola Titus MD  04/17/22  09:35 CDT      Electronically signed by Abiola Yan MD at 04/17/22 1311     Tiffanie Scott MD at 04/17/22 0924        Discussed with family at bedside and Dr. Villarreal.  Leg still has the swelling without erythema or warmth to the touch in the left groin area and inner thigh.  I question whether this represents hematoma and possible spontaneous bleeding as she has had in the past with anticoagulation versus possible abscess.  Possible encephalitis being worked up.  We did discuss further imaging of the leg but undergoing neurologic testing today so maybe tomorrow.  I explained to the  at the bedside that this would be a very difficult area for me as a general surgeon to surgically address and may best be at the tertiary care center.  She is had a very complex and complicated prolonged illness and been treated in the past at [facility].  Primary care team trying to effect transfer back    Electronically signed by Tiffanie Scott MD at 04/17/22 0928     Amado Villarreal MD at 04/17/22 0832              [facility] Medicine Services  INPATIENT PROGRESS NOTE    Length of Stay: 13  Date of Admission: 4/2/2022  Primary Care Physician: David Lara MD    Subjective   Chief Complaint: Failure to thrive/metabolic acidosis/encephalitis    HPI   MRI result discussed with , plan for EEG and a spinal tap today.  Hemoglobin drop, no sign of acute bleed, plan for another unit of blood today.  I believe may be the blood was drawn too early before the blood gets is distributed from 2 units of blood yesterday.  Right thigh abscess, not any bigger.   Blood pressure stable, low . metabolic acidosis, CO2 is normal, DC bicarb changed to LR.    Review of Systems   Patient is arousable, refusing to talk.  No sign of acute distress.    All pertinent negatives and positives are as above. All other systems have been reviewed and are negative unless otherwise stated.     Objective    Temp:  [97.3 °F (36.3 °C)-98.4 °F (36.9 °C)] 97.3 °F (36.3 °C)  Heart Rate:  [] 101  Resp:  [13-18] 13  BP: ()/() 88/64    Intake/Output Summary (Last 24 hours) at 4/17/2022 0832  Last data filed at 4/17/2022 0300  Gross per 24 hour   Intake 3454.63 ml   Output 1375 ml   Net 2079.63 ml     Physical Exam  Constitutional:       Appearance: She is well-developed.   HENT:      Head: Normocephalic.   Eyes:      Conjunctiva/sclera: Conjunctivae normal.      Pupils: Pupils are equal, round, and reactive to light.   Neck:      Vascular: No JVD.   Cardiovascular:      Rate and Rhythm: Regular rhythm.  Rates in the 100 .     Heart sounds: Normal heart sounds. No murmur heard.    No friction rub. No gallop.   Pulmonary:      Effort: No respiratory distress.      Breath sounds: No wheezing or rales.      Comments: Diminished breath sound bilateral, clear .  Chest:      Chest wall: No tenderness.   Abdominal:      General: Bowel sounds are normal. There is no distension.      Palpations: Abdomen is soft.      Tenderness: There is no abdominal tenderness. There is no guarding or rebound.   Musculoskeletal:         General: No tenderness or deformity.      Cervical back: Neck supple.   Skin:     General: Skin is warm and dry.      Capillary Refill: Capillary refill takes 2 to 3 seconds.      Findings: No rash.   Neurological:      Cranial Nerves: No cranial nerve deficit.      Motor: Weakness present. No abnormal muscle tone.      Coordination: Coordination abnormal.      Gait: Gait abnormal.      Deep Tendon Reflexes: Reflexes normal.   Results Review:  Lab Results (last 24 hours)      Procedure Component Value Units Date/Time    CBC & Differential [[id number]]  (Abnormal) Collected: 04/17/22 0732    Specimen: Blood Updated: 04/17/22 0813    Narrative:      The following orders were created for panel order CBC & Differential.  Procedure                               Abnormality         Status                     ---------                               -----------         ------                     CBC Auto Differential[[id number]]        Abnormal            Final result                 Please view results for these tests on the individual orders.    CBC Auto Differential [[id number]]  (Abnormal) Collected: 04/17/22 0732    Specimen: Blood Updated: 04/17/22 0813     WBC 12.14 10*3/mm3      RBC 2.32 10*6/mm3      Hemoglobin 6.6 g/dL      Hematocrit 19.4 %      MCV 83.6 fL      MCH 28.4 pg      MCHC 34.0 g/dL      RDW 14.6 %      RDW-SD 44.3 fl      MPV 9.6 fL      Platelets 161 10*3/mm3      Neutrophil % 66.7 %      Lymphocyte % 17.6 %      Monocyte % 10.5 %      Eosinophil % 1.5 %      Basophil % 0.2 %      Immature Grans % 3.5 %      Neutrophils, Absolute 8.08 10*3/mm3      Lymphocytes, Absolute 2.14 10*3/mm3      Monocytes, Absolute 1.28 10*3/mm3      Eosinophils, Absolute 0.18 10*3/mm3      Basophils, Absolute 0.03 10*3/mm3      Immature Grans, Absolute 0.43 10*3/mm3      nRBC 0.4 /100 WBC     Narrative:      ckd with  Recollect     Phosphorus [[id number]]  (Normal) Collected: 04/17/22 0357    Specimen: Blood Updated: 04/17/22 0425     Phosphorus 3.1 mg/dL     Magnesium [[id number]]  (Normal) Collected: 04/17/22 0357    Specimen: Blood Updated: 04/17/22 0425     Magnesium 1.9 mg/dL     Comprehensive Metabolic Panel [[id number]]  (Abnormal) Collected: 04/17/22 0357    Specimen: Blood Updated: 04/17/22 0424     Glucose 126 mg/dL      BUN 11 mg/dL      Creatinine 1.33 mg/dL      Sodium 140 mmol/L      Potassium 3.8 mmol/L      Chloride 104 mmol/L      CO2 25.0 mmol/L      Calcium 7.5 mg/dL      Total  Protein 4.0 g/dL      Albumin 1.80 g/dL      ALT (SGPT) 15 U/L      AST (SGOT) 28 U/L      Alkaline Phosphatase 63 U/L      Total Bilirubin 0.4 mg/dL      Globulin 2.2 gm/dL      A/G Ratio 0.8 g/dL      BUN/Creatinine Ratio 8.3     Anion Gap 11.0 mmol/L      eGFR 50.7 mL/min/1.73      Comment: National Kidney Foundation and American Society of Nephrology (ASN) Task Force recommended calculation based on the Chronic Kidney Disease Epidemiology Collaboration (CKD-EPI) equation refit without adjustment for race.       Narrative:      GFR Normal >60  Chronic Kidney Disease <60  Kidney Failure <15      Blood Culture - Blood, Arm, Left [[id number]]  (Normal) Collected: 04/16/22 0346    Specimen: Blood from Arm, Left Updated: 04/17/22 0417     Blood Culture No growth at 24 hours    Protime-INR [[id number]]  (Abnormal) Collected: 04/17/22 0357    Specimen: Blood Updated: 04/17/22 0416     Protime 16.3 Seconds      INR 1.37    aPTT [[id number]]  (Abnormal) Collected: 04/17/22 0357    Specimen: Blood Updated: 04/17/22 0416     PTT 36.7 seconds     Blood Culture - Blood, Arm, Left [[id number]]  (Normal) Collected: 04/16/22 0112    Specimen: Blood from Arm, Left Updated: 04/17/22 0147     Blood Culture No growth at 24 hours    Blood Culture With AASHISH - Blood, Arm, Right [[id number]]  (Normal) Collected: 04/14/22 1938    Specimen: Blood from Arm, Right Updated: 04/16/22 2017     Blood Culture No growth at 2 days    Phosphorus [[id number]]  (Abnormal) Collected: 04/16/22 1511    Specimen: Blood Updated: 04/16/22 1554     Phosphorus 1.4 mg/dL     Basic Metabolic Panel [[id number]]  (Abnormal) Collected: 04/16/22 1511    Specimen: Blood Updated: 04/16/22 1543     Glucose 138 mg/dL      BUN 14 mg/dL      Creatinine 1.77 mg/dL      Sodium 143 mmol/L      Potassium 3.1 mmol/L      Chloride 108 mmol/L      CO2 24.0 mmol/L      Calcium 7.8 mg/dL      BUN/Creatinine Ratio 7.9     Anion Gap 11.0 mmol/L      eGFR 36.0 mL/min/1.73      Comment:  National Kidney Foundation and American Society of Nephrology (ASN) Task Force recommended calculation based on the Chronic Kidney Disease Epidemiology Collaboration (CKD-EPI) equation refit without adjustment for race.       Narrative:      GFR Normal >60  Chronic Kidney Disease <60  Kidney Failure <15      Magnesium [[id number]]  (Abnormal) Collected: 04/16/22 1511    Specimen: Blood Updated: 04/16/22 1537     Magnesium 1.5 mg/dL     Hemoglobin & Hematocrit, Blood [[id number]]  (Abnormal) Collected: 04/16/22 1511    Specimen: Blood Updated: 04/16/22 1524     Hemoglobin 9.0 g/dL      Hematocrit 26.3 %            Cultures:  Blood Culture   Date Value Ref Range Status   04/16/2022 No growth at 24 hours  Preliminary   04/16/2022 No growth at 24 hours  Preliminary   04/14/2022 No growth at 2 days  Preliminary     Urine Culture   Date Value Ref Range Status   04/14/2022 >100,000 CFU/mL Gram Negative Bacilli (A)  Preliminary       Radiology Data:    Imaging Results (Last 24 Hours)     Procedure Component Value Units Date/Time    MRI Brain Without Contrast [[id number]] Collected: 04/16/22 1842     Updated: 04/16/22 1850    Narrative:      EXAMINATION:   MRI BRAIN WO CONTRAST-  4/16/2022 6:42 PM CDT     HISTORY: MRI brain without contrast 4/16/2022 6:00 PM CDT     History: Unresponsive staring acute neuro change; E87.6-Hypokalemia;  N39.0-Urinary tract infection, site not specified; R31.9-Hematuria,  unspecified; R11.2-Nausea with vomiting, unspecified; E87.2-Acidosis;  R13.10-Dysphagia, unspecified; A41.9-Sepsis, unspecified organism;  N39.0-Urinary tract infection, site not specified     Comparison: None.       Technique: Multiplanar imaging of the brain was performed in a routine  fashion.      Findings:   Midline structures are nondisplaced. Ventricular system is normal. There  is no significant mass effect or hydrocephalus. Basilar cisterns are  preserved. Abnormal restricted diffusion is noted bilaterally in  the  occipital lobes.. No abnormal extra axial fluid collections are noted.  Restricted diffusion is noted which is subcortical in the bilateral  parietal cortex. This may be an encephalitis. The FLAIR sequence is not  as intense as the restricted diffusion.     Mild cortical volume loss is noted..      Proximal cervical spinal cord, brainstem, and cerebellum are  unremarkable. Normal cerebrovascular flow voids are seen. Bilateral  globes and orbits are normal in appearance.     No abnormal signal is noted in the mastoid air cells or paranasal  sinuses.        Impression:      Impression:   Abnormal signal with restricted diffusion in the bilateral occipital and  also subcortical bilateral parietal cortex. Most likely this is an  encephalitis.         This report was finalized on 04/16/2022 18:47 by Dr. Nadeem Marshall MD.    CT Head Without Contrast [[id number]] Collected: 04/16/22 1150     Updated: 04/16/22 1156    Narrative:      EXAM/TECHNIQUE: CT HEAD WO CONTRAST-     INDICATION: Mental status change, generalized weakness, dizziness     COMPARISON: 04/11/2022     DLP: 643 mGy cm. Automated exposure control was also utilized to  decrease patient radiation dose.     FINDINGS:     No evidence of acute intracranial hemorrhage. No loss of gray-white  differentiation. No midline shift or mass effect. Lateral ventricles are  nondilated. Basilar cisterns are patent. No acute orbital finding.  Mastoid air cells are clear. Visualized paranasal sinuses are clear.  Partially imaged nasogastric tube. No acute osseous finding.       Impression:         No acute intracranial findings.  This report was finalized on 04/16/2022 11:53 by Dr. Gomez Cárdenas MD.    XR Abdomen KUB [[id number]] Collected: 04/16/22 1100     Updated: 04/16/22 1105    Narrative:      EXAM: XR ABDOMEN KUB-     INDICATION: NG placement; E87.6-Hypokalemia; N39.0-Urinary tract  infection, site not specified; R31.9-Hematuria, unspecified;  R11.2-Nausea with  vomiting, unspecified; E87.2-Acidosis;  R13.10-Dysphagia, unspecified; A41.9-Sepsis, unspecified organism;  N39.0-Urinary tract infection, site not specified     COMPARISON: None available.     FINDINGS:     Nasogastric tube tip projects over the expected location of the gastric  fundus. A left-sided nephrostomy tube/ureteral stent is in place. There  is marked gaseous distention of the stomach, small bowel, and colon.  There is a transverse colon measures up to 8 mm in diameter. Small bowel  loops measure up to 3.37 m in diameter. A RIGHT femoral venous line is  noted.       Impression:         1.  Enteric tube projects over the proximal stomach.  2.  Marked distention of the stomach, small bowel, and colon. Favor  ileus.  This report was finalized on 04/16/2022 11:02 by Dr. Gomez Cárdenas MD.          Allergies   Allergen Reactions   • Coconut Unknown - High Severity   • Nuts Unknown - High Severity   • Penicillins    • [place] Other (See Comments)     Causes migraines per pt reports       Scheduled meds:   ertapenem, 1 g, Intravenous, Q24H  famotidine, 20 mg, Intravenous, Daily  FLUoxetine, 20 mg, Oral, Nightly  fluticasone, 2 spray, Each Nare, BID  folic acid, 1 mg, Oral, Daily  neomycin-polymyxin-hydrocortisone, 4 drop, Both Ears, Q6H  oxymetazoline, 2 spray, Nasal, BID  sodium bicarbonate, 650 mg, Oral, BID  sodium chloride, 10 mL, Intravenous, Q12H  SUMAtriptan, 50 mg, Oral, Once        PRN meds:  hydrALAZINE  •  labetalol  •  ondansetron **OR** ondansetron  •  potassium chloride **OR** potassium chloride **OR** potassium chloride  •  potassium phosphate infusion greater than 15 mMoles **OR** potassium phosphate infusion greater than 15 mMoles **OR** potassium phosphate **OR** sodium phosphate IVPB **OR** sodium phosphate IVPB **OR** sodium phosphate IVPB  •  sodium chloride  •  [COMPLETED] Insert peripheral IV **AND** sodium chloride  •  [COMPLETED] Insert peripheral IV **AND** sodium chloride  •  sodium  chloride    Assessment/Plan       Intractable nausea and vomiting    Sepsis secondary to UTI (HCC)    Lactic acidosis    Hypokalemia    Essential (primary) hypertension    UTI (urinary tract infection), bacterial    Malfunction of nephrostomy tube (HCC)    Severe malnutrition (CMS/HCC)    Hypophosphatemia      Plan:  HPI.  Patient was admitted on 4/3 by Dr. Ricks.  Had COVID-19 here last year and ended up having an abdominal hematoma and had to go to [facility].  Was there for a long time and then an LTAC and then a skilled nursing facility.  Comes back with multidrug-resistant UTI on Invanz.   She presented with complaints of intractable nausea and vomiting.  Her medical problems started last August when she developed COVID-19 pneumonia and had numerous complications thereafter.  She developed an abdominal hematoma and had to go on dialysis secondary to extrinsic compression of her urinary system. She was transferred from here to [facility] and required exploratory laparotomy.  She ended up having bilateral percutaneous nephrostomy tubes and also ended up with a left ureteral stent.  She states that her main urologist is Dr. Rina Rey.  She states that she stopped producing urine from her percutaneous nephrostomy tube recently and that there are plans for it to be removed.  She states that she had gotten in touch with [facility] to see if someone here could do it so she would not have to travel.  When she left [facility], she spent considerable time at a skilled nursing facility in [place], [place].  She states that she has only been home a few weeks.  She was admitted to [facility] in [place] on 3/9 for electrolyte abnormalities.     UTI/abscess left abductor katie muscle.  Renal function stable yesterday with a creatinine 0.76.  Dr. Ricks began treating a urinary tract infection with ceftriaxone.  She had a multidrug-resistant Klebsiella in October 2021.  I transitioned her to  Invanz on 4/3.  Lactate down to 1.7 from 2.8 after fluids.  Urine does appear infected with too numerous to count white blood cells, 4+ bacteria and large leukocyte esterase. The sample was also bloody.  Urine culture has grown 2 distinct pathogens.  There is a multidrug-resistant Proteus mirabilis strain and an ESBL Klebsiella strain.  Invanz should be treating both of these organisms concurrently.  Today is day 5 of Invanz.  We will treat 7-10 days.  Lactic acidosis improved after fluids and antibiotics.  Procalcitonin elevated at 0.31.  Dr. Dyer is familiar with her previous problems as he saw her last year.  I consulted him to evaluate her.  He wants to treat her current infection and then have her follow back at [facility].  Recommendation from urology-  Urology saw her and wants her to go back to [facility] to have her percutaneous nephrostomy pulled.  Long discussion with Dr. Haro yesterday urology at [facility], covering for Dr. Rey 4/14/22.  Recommended for infectious disease consult and Stratton cath placement.  Discussed with transfer line this morning saying that Dr. Rey will call me today to reevaluated.  Urology  believes his refluxing causing recurrent infection, and recommended stratton cath placement. PCNU, stent place in [facility].  [facility] refused transfer 4/15/2022, due to capacity.  I asked to put her on the waiting list, this was denied..  I gave a call  to [facility] 4/16/22 discussed a decline in mental status and hemoglobin drop and possible abscess in the left abductor katie muscles, again no capacity.   CT scan abdomen pelvic-Suspicious for abscess in the left abductor katie muscle, Left external/internal percutaneous nephrostomy ureterostomy catheter  is satisfactorily position, Fluid and air is present in the colon- associated with diarrhea.  Patient is on Invanz.  Leukocytosis improving.  I called [facility] today, agreed except the patient on a waiting list, Dr. Torrez at [facility]  4/17/2022.     Altered mental status/encephalitis.    DC Fioricet as needed due to somnolence.  Reconsult neurology.  MRI of the head-Abnormal signal with restricted diffusion in the bilateral occipital and  also subcortical bilateral parietal cortex- likely this is an encephalitis.   Plan for EEG and spinal tap by neurology today.     Anemia/questionable bleeding.  Status post 2 units of blood 4/16/2022.  Hemoglobin today 6.6.  Plan for another transfusion of blood, 1 unit of blood. Stop Xarelto for/16/22 to.  No sign of acute bleed.  Continue folate.     Hypokalemia .  Resolved...  Magnesium-normal.     Sludge in gallbladder/dysfunctional gallbladder?.  General surgery consult.  GI consult. No surgery per general surgery.  HIDA scan-20% biliary ejection fraction.  Ultrasound abdomen pelvic-Sludge-filled gallbladder with no significant gallbladder wall thickening, pericholecystic fluid or convincing evidence of acute cholecystitis, Mild dilation of the common hepatic duct with no visualized choledocholithiasis, Hepatic steatosis.     Metabolic acidosis.  Resolved.  DC bicarb drip, change to LR.     Nausea/vomiting/ileus.  Nausea vomiting resolved.  DC Reglan.  Protonix. Bicarb. DC Inapsine as needed.  DC Compazine as needed. DC Phenergan as needed.   Plan for NG tube placement.    Renal insufficiency.  Improving.  Ongoing IV hydration.     Hyppotension/tachycardia.  Levophed stopped yesterday 4/16/2022.  Stop Toprol     Sinus congestion/sinus pain.  Flonase.  Corticosporin eardrop.  ENT consult.  [facility].  Dr. Nichols saw her and has wanted to do an audiogram, but she has declined to go over to his office the last 2 days.   CTA chest-No evidence of pulmonary embolus, Small LEFT pleural effusion, Bandlike areas of reticulation throughout both lungs likely representing scarring/fibrosis related to prior Covid 19 infection.      History of pulmonary embolus.  Patient had Xarelto on 4/11 and 4/12.  Patient has Lovenox 4/14  and 4/15.  Last dose of Lovenox was 1749 on the 15th.  Anticoagulation held due to anemia and possible bleeding.     Anxiety/depression/insomnia . Decrease Prozac nightly due to somnolence.   DC Ambien as needed due to somnolence.  DC trazodone due to somnolent.     Pain.  DC Robaxin as needed due to somnolence. Morphine as needed.      Allergic rhinitis.  Flonase.     Nutrition.  Hold NG tube feeding due to ileus..     Deconditioning.  PT and OT consult.  Last time patient walk was in August of last year after Covid. Patient is mostly bedbound at the nursing home.     Blood culture-no growth in 24 hours.   Covid-19-negative.  Flu screen A and B-negative.  Urine culture gram-negative bacilli.     Discharge Planning: 3-6 days.     Electronically signed by Amado Villarreal MD, 04/17/22, 8:32 AM CDT.                     Electronically signed by Amado Villarreal MD at 04/17/22 4092

## 2022-04-18 NOTE — SIGNIFICANT NOTE
Intubated at 1253 with Dr. Potter at bedside. 23@ lip with positive color change and bilateral breath sounds.

## 2022-04-18 NOTE — PLAN OF CARE
Goal Outcome Evaluation:   Pt remains nonverbal, moans. With frequent hiccups  NG to LIS with light blue output, MD aware.   BP borderline low.  HR SR/ST   See chart for further assessment details

## 2022-04-19 ENCOUNTER — APPOINTMENT (OUTPATIENT)
Dept: GENERAL RADIOLOGY | Facility: HOSPITAL | Age: 47
End: 2022-04-19

## 2022-04-19 ENCOUNTER — APPOINTMENT (OUTPATIENT)
Dept: ULTRASOUND IMAGING | Facility: HOSPITAL | Age: 47
End: 2022-04-19

## 2022-04-19 LAB
ALBUMIN SERPL-MCNC: 2.2 G/DL (ref 3.5–5.2)
ALBUMIN/GLOB SERPL: 1 G/DL
ALP SERPL-CCNC: 88 U/L (ref 39–117)
ALT SERPL W P-5'-P-CCNC: 21 U/L (ref 1–33)
ANION GAP SERPL CALCULATED.3IONS-SCNC: 10 MMOL/L (ref 5–15)
ARTERIAL PATENCY WRIST A: POSITIVE
AST SERPL-CCNC: 43 U/L (ref 1–32)
ATMOSPHERIC PRESS: 757 MMHG
BACTERIA SPEC AEROBE CULT: NORMAL
BASE EXCESS BLDA CALC-SCNC: 1.1 MMOL/L (ref 0–2)
BASOPHILS # BLD AUTO: 0.05 10*3/MM3 (ref 0–0.2)
BASOPHILS NFR BLD AUTO: 0.5 % (ref 0–1.5)
BDY SITE: ABNORMAL
BILIRUB SERPL-MCNC: 0.6 MG/DL (ref 0–1.2)
BODY TEMPERATURE: 37 C
BUN SERPL-MCNC: 6 MG/DL (ref 6–20)
BUN/CREAT SERPL: 6.6 (ref 7–25)
CALCIUM SPEC-SCNC: 8.7 MG/DL (ref 8.6–10.5)
CHLORIDE SERPL-SCNC: 108 MMOL/L (ref 98–107)
CO2 SERPL-SCNC: 24 MMOL/L (ref 22–29)
CREAT SERPL-MCNC: 0.91 MG/DL (ref 0.57–1)
CYTO UR: NORMAL
DEPRECATED RDW RBC AUTO: 47.1 FL (ref 37–54)
EGFRCR SERPLBLD CKD-EPI 2021: 79.9 ML/MIN/1.73
EOSINOPHIL # BLD AUTO: 0.28 10*3/MM3 (ref 0–0.4)
EOSINOPHIL NFR BLD AUTO: 2.7 % (ref 0.3–6.2)
ERYTHROCYTE [DISTWIDTH] IN BLOOD BY AUTOMATED COUNT: 14.8 % (ref 12.3–15.4)
GLOBULIN UR ELPH-MCNC: 2.3 GM/DL
GLUCOSE SERPL-MCNC: 91 MG/DL (ref 65–99)
HCO3 BLDA-SCNC: 25 MMOL/L (ref 20–26)
HCT VFR BLD AUTO: 25.9 % (ref 34–46.6)
HGB BLD-MCNC: 8.8 G/DL (ref 12–15.9)
IMM GRANULOCYTES # BLD AUTO: 0.47 10*3/MM3 (ref 0–0.05)
IMM GRANULOCYTES NFR BLD AUTO: 4.5 % (ref 0–0.5)
INHALED O2 CONCENTRATION: 30 %
LAB AP CASE REPORT: NORMAL
LYMPHOCYTES # BLD AUTO: 1.44 10*3/MM3 (ref 0.7–3.1)
LYMPHOCYTES NFR BLD AUTO: 13.9 % (ref 19.6–45.3)
Lab: ABNORMAL
MCH RBC QN AUTO: 30.2 PG (ref 26.6–33)
MCHC RBC AUTO-ENTMCNC: 34 G/DL (ref 31.5–35.7)
MCV RBC AUTO: 89 FL (ref 79–97)
MODALITY: ABNORMAL
MONOCYTES # BLD AUTO: 0.89 10*3/MM3 (ref 0.1–0.9)
MONOCYTES NFR BLD AUTO: 8.6 % (ref 5–12)
NEUTROPHILS NFR BLD AUTO: 69.8 % (ref 42.7–76)
NEUTROPHILS NFR BLD AUTO: 7.25 10*3/MM3 (ref 1.7–7)
NRBC BLD AUTO-RTO: 0.3 /100 WBC (ref 0–0.2)
PATH REPORT.FINAL DX SPEC: NORMAL
PATH REPORT.GROSS SPEC: NORMAL
PCO2 BLDA: 35.7 MM HG (ref 35–45)
PCO2 TEMP ADJ BLD: 35.7 MM HG (ref 35–45)
PEEP RESPIRATORY: 5 CM[H2O]
PH BLDA: 7.45 PH UNITS (ref 7.35–7.45)
PH, TEMP CORRECTED: 7.45 PH UNITS (ref 7.35–7.45)
PLATELET # BLD AUTO: 214 10*3/MM3 (ref 140–450)
PMV BLD AUTO: 10.6 FL (ref 6–12)
PO2 BLDA: 155 MM HG (ref 83–108)
PO2 TEMP ADJ BLD: 155 MM HG (ref 83–108)
POTASSIUM SERPL-SCNC: 3.6 MMOL/L (ref 3.5–5.2)
POTASSIUM SERPL-SCNC: 4.5 MMOL/L (ref 3.5–5.2)
PROT SERPL-MCNC: 4.5 G/DL (ref 6–8.5)
RBC # BLD AUTO: 2.91 10*6/MM3 (ref 3.77–5.28)
SAO2 % BLDCOA: 99.8 % (ref 94–99)
SET MECH RESP RATE: 14
SODIUM SERPL-SCNC: 142 MMOL/L (ref 136–145)
VENTILATOR MODE: AC
VT ON VENT VENT: 420 ML
WBC NRBC COR # BLD: 10.38 10*3/MM3 (ref 3.4–10.8)

## 2022-04-19 PROCEDURE — 94003 VENT MGMT INPAT SUBQ DAY: CPT

## 2022-04-19 PROCEDURE — 99233 SBSQ HOSP IP/OBS HIGH 50: CPT | Performed by: PSYCHIATRY & NEUROLOGY

## 2022-04-19 PROCEDURE — 25010000002 LEVETIRACETAM IN NACL 0.82% 500 MG/100ML SOLUTION: Performed by: PSYCHIATRY & NEUROLOGY

## 2022-04-19 PROCEDURE — 71045 X-RAY EXAM CHEST 1 VIEW: CPT

## 2022-04-19 PROCEDURE — 93970 EXTREMITY STUDY: CPT

## 2022-04-19 PROCEDURE — 25010000002 PROPOFOL 10 MG/ML EMULSION: Performed by: FAMILY MEDICINE

## 2022-04-19 PROCEDURE — 25010000002 ERTAPENEM PER 500 MG: Performed by: FAMILY MEDICINE

## 2022-04-19 PROCEDURE — 36600 WITHDRAWAL OF ARTERIAL BLOOD: CPT

## 2022-04-19 PROCEDURE — 94799 UNLISTED PULMONARY SVC/PX: CPT

## 2022-04-19 PROCEDURE — 82803 BLOOD GASES ANY COMBINATION: CPT

## 2022-04-19 PROCEDURE — 99232 SBSQ HOSP IP/OBS MODERATE 35: CPT

## 2022-04-19 PROCEDURE — 84132 ASSAY OF SERUM POTASSIUM: CPT | Performed by: INTERNAL MEDICINE

## 2022-04-19 PROCEDURE — 94761 N-INVAS EAR/PLS OXIMETRY MLT: CPT

## 2022-04-19 PROCEDURE — 99221 1ST HOSP IP/OBS SF/LOW 40: CPT | Performed by: NURSE PRACTITIONER

## 2022-04-19 PROCEDURE — 80053 COMPREHEN METABOLIC PANEL: CPT | Performed by: FAMILY MEDICINE

## 2022-04-19 PROCEDURE — 93970 EXTREMITY STUDY: CPT | Performed by: SURGERY

## 2022-04-19 PROCEDURE — 0 POTASSIUM CHLORIDE 10 MEQ/100ML SOLUTION: Performed by: FAMILY MEDICINE

## 2022-04-19 PROCEDURE — 85025 COMPLETE CBC W/AUTO DIFF WBC: CPT | Performed by: FAMILY MEDICINE

## 2022-04-19 PROCEDURE — 97168 OT RE-EVAL EST PLAN CARE: CPT | Performed by: OCCUPATIONAL THERAPIST

## 2022-04-19 RX ADMIN — LEVETIRACETAM 500 MG: 5 INJECTION INTRAVASCULAR at 20:21

## 2022-04-19 RX ADMIN — NEOMYCIN SULFATE, POLYMYXIN B SULFATE AND HYDROCORTISONE 4 DROP: 10; 3.5; 1 SUSPENSION/ DROPS AURICULAR (OTIC) at 05:49

## 2022-04-19 RX ADMIN — FAMOTIDINE 20 MG: 10 INJECTION INTRAVENOUS at 20:21

## 2022-04-19 RX ADMIN — FLUTICASONE PROPIONATE 2 SPRAY: 50 SPRAY, METERED NASAL at 20:21

## 2022-04-19 RX ADMIN — Medication 10 ML: at 08:56

## 2022-04-19 RX ADMIN — POTASSIUM CHLORIDE 10 MEQ: 7.46 INJECTION, SOLUTION INTRAVENOUS at 07:32

## 2022-04-19 RX ADMIN — FLUTICASONE PROPIONATE 2 SPRAY: 50 SPRAY, METERED NASAL at 08:55

## 2022-04-19 RX ADMIN — Medication 10 ML: at 20:21

## 2022-04-19 RX ADMIN — NEOMYCIN SULFATE, POLYMYXIN B SULFATE AND HYDROCORTISONE 4 DROP: 10; 3.5; 1 SUSPENSION/ DROPS AURICULAR (OTIC) at 12:06

## 2022-04-19 RX ADMIN — SODIUM CHLORIDE, POTASSIUM CHLORIDE, SODIUM LACTATE AND CALCIUM CHLORIDE 75 ML/HR: 600; 310; 30; 20 INJECTION, SOLUTION INTRAVENOUS at 04:45

## 2022-04-19 RX ADMIN — SODIUM BICARBONATE 650 MG: 650 TABLET ORAL at 20:21

## 2022-04-19 RX ADMIN — NEOMYCIN SULFATE, POLYMYXIN B SULFATE AND HYDROCORTISONE 4 DROP: 10; 3.5; 1 SUSPENSION/ DROPS AURICULAR (OTIC) at 18:09

## 2022-04-19 RX ADMIN — POTASSIUM CHLORIDE 10 MEQ: 7.46 INJECTION, SOLUTION INTRAVENOUS at 09:37

## 2022-04-19 RX ADMIN — POTASSIUM CHLORIDE 10 MEQ: 7.46 INJECTION, SOLUTION INTRAVENOUS at 05:49

## 2022-04-19 RX ADMIN — SODIUM BICARBONATE 650 MG: 650 TABLET ORAL at 08:55

## 2022-04-19 RX ADMIN — LEVETIRACETAM 500 MG: 5 INJECTION INTRAVASCULAR at 08:55

## 2022-04-19 RX ADMIN — NEOMYCIN SULFATE, POLYMYXIN B SULFATE AND HYDROCORTISONE 4 DROP: 10; 3.5; 1 SUSPENSION/ DROPS AURICULAR (OTIC) at 00:27

## 2022-04-19 RX ADMIN — FLUOXETINE HYDROCHLORIDE 20 MG: 20 CAPSULE ORAL at 20:21

## 2022-04-19 RX ADMIN — PROPOFOL INJECTABLE EMULSION 20 MCG/KG/MIN: 10 INJECTION, EMULSION INTRAVENOUS at 04:45

## 2022-04-19 RX ADMIN — POTASSIUM CHLORIDE 10 MEQ: 7.46 INJECTION, SOLUTION INTRAVENOUS at 10:46

## 2022-04-19 RX ADMIN — FOLIC ACID 1 MG: 1 TABLET ORAL at 20:21

## 2022-04-19 RX ADMIN — FAMOTIDINE 20 MG: 10 INJECTION INTRAVENOUS at 09:36

## 2022-04-19 RX ADMIN — PROPOFOL INJECTABLE EMULSION 20 MCG/KG/MIN: 10 INJECTION, EMULSION INTRAVENOUS at 20:11

## 2022-04-19 RX ADMIN — ERTAPENEM SODIUM 1 G: 1 INJECTION, POWDER, LYOPHILIZED, FOR SOLUTION INTRAMUSCULAR; INTRAVENOUS at 15:39

## 2022-04-19 RX ADMIN — PROPOFOL INJECTABLE EMULSION 20 MCG/KG/MIN: 10 INJECTION, EMULSION INTRAVENOUS at 10:45

## 2022-04-19 NOTE — PLAN OF CARE
Goal Outcome Evaluation:           Progress: no change  Outcome Evaluation: Pt remains sedated and intubated. Sedation turned off x20 minutes, pt withdrew x4 extremities, grimacing, pupils PEERL. Tremor noted in left arm, MD aware. Adequate clear UOP with stratton. FMS minimal output. BPs soft, levo off all night. LR infusing at 75, propofol at 20. Temp at 0400 was 95.3, forced air warmer initiated. VSS, safety maintained, turned q2H, continue to monitor.

## 2022-04-19 NOTE — THERAPY DISCHARGE NOTE
Acute Care - Occupational Therapy Discharge  [facility]    Patient Name: Namita Zabala  : 1975    MRN: [id number]                              Today's Date: 2022       Admit Date: 2022    Visit Dx:     ICD-10-CM ICD-9-CM   1. Hypokalemia  E87.6 276.8   2. Urinary tract infection with hematuria, site unspecified  N39.0 599.0    R31.9 599.70   3. Nausea and vomiting, unspecified vomiting type  R11.2 787.01   4. Lactic acidosis  E87.2 276.2   5. Dysphagia, unspecified type  R13.10 787.20   6. Sepsis secondary to UTI ([facility])  A41.9 038.9    N39.0 995.91     599.0   7. Decreased activities of daily living (ADL)  Z78.9 V49.89     Patient Active Problem List   Diagnosis   • COVID-19   • Acute respiratory failure with hypoxia ([facility])   • Sepsis secondary to UTI ([facility])   • Acute pulmonary embolism ([facility])   • Chronic migraine   • MVP (mitral valve prolapse)   • Bilateral pneumonia   • Lactic acidosis   • Elevated transaminase level   • JUVENAL (acute kidney injury) ([facility])   • Ileus ([facility])   • Hyperkalemia   • Pelvic hematoma, female   • Acute blood loss anemia   • Cytokine release syndrome, grade 4   • Hypokalemia   • Anxiety   • Essential (primary) hypertension   • Myofascial pain syndrome   • Vitamin B12 deficiency   • Intractable nausea and vomiting   • UTI (urinary tract infection), bacterial   • Malfunction of nephrostomy tube ([facility])   • Severe malnutrition (CMS/HCC)   • Hypophosphatemia     Past Medical History:   Diagnosis Date   • Angina at rest ([facility])    • Migraine     Sees Pain Management for injections.    • MVP (mitral valve prolapse)    • Renal disorder    • Syncope      Past Surgical History:   Procedure Laterality Date   • BREAST BIOPSY Right     benign   • INSERTION HEMODIALYSIS CATHETER Left 2021    Procedure: HEMODIALYSIS CATHETER INSERTION;  Surgeon: Anders Kaur MD;  Location: [place] [id number];  Service: Vascular;  Laterality: Left;   • TONSILLECTOMY        General  Information     Row Name 04/19/22 1430          OT Time and Intention    Document Type discharge evaluation/summary  -     Mode of Treatment occupational therapy  -     Row Name 04/19/22 1430          General Information    Patient Profile Reviewed yes  -     Existing Precautions/Restrictions fall  -     Barriers to Rehab medically complex;previous functional deficit  Vent  -     Row Name 04/19/22 1430          Cognition    Orientation Status (Cognition) unable/difficult to assess  sedated on mechanical vent.  -     Row Name 04/19/22 1430          Safety Issues, Functional Mobility    Impairments Affecting Function (Mobility) range of motion (ROM)  -           User Key  (r) = Recorded By, (t) = Taken By, (c) = Cosigned By    Initials Name Provider Type    Rina Titus OTR/L, JUWAN Occupational Therapist               Mobility/ADL's     Row Name 04/19/22 1430          Bed Mobility    Comment, (Bed Mobility) Pt sedated on mechanical vent. Is Dep for all movement  -           User Key  (r) = Recorded By, (t) = Taken By, (c) = Cosigned By    Initials Name Provider Type    Rina Titus OTR/L, JUWAN Occupational Therapist               Obj/Interventions     Row Name 04/19/22 0910          Sensory Assessment (Somatosensory)    Sensory Assessment withdrawls from painful stimuli  -[facility] Name 04/19/22 0910          Range of Motion Comprehensive    Comment, General Range of Motion B UE PROM WFL, increased tightness noted in all joints with digits on B hands are curled into loose fist. Increased tone noted. Pt placed in B wrist/hand orthotic to prevent joint contracture. RN educated on wear schedule/fitting/mgt and to check for skin breakdown.  -     Row Name 04/19/22 0910          Strength Comprehensive (MMT)    Comment, General Manual Muscle Testing (MMT) Assessment No active movement observed in any extremity.  -           User Key  (r) = Recorded By, (t) = Taken By, (c) = Cosigned  By    Initials Name Provider Type    Rina Amezquita, OTR/L, CSRS Occupational Therapist               Goals/Plan    No documentation.                Clinical Impression     Row Name 04/19/22 1430          Pain Assessment    Additional Documentation Pain Scale: FACES Pre/Post-Treatment (Group)  -     Row Name 04/19/22 1430          Pain Scale: FACES Pre/Post-Treatment    Pain: FACES Scale, Pretreatment 0-->no hurt  -JJ     Posttreatment Pain Rating 0-->no hurt  -J     Row Name 04/19/22 1430          Plan of Care Review    Plan of Care Reviewed With patient  -     Outcome Evaluation OT eval order received and completed. Pt is sedated on mechanical vent. She withdrawls all four extremities from painful stimuli. Able to work B UE through full PROM but increased tightness/tone noted at all joints with digits on B hands curled into loose fist. Increased tone noted. Pt placed in B wrist/hand orthotic to prevent joint contracture. RN educated on wear schedule/fitting/mgt and to check for skin breakdown. OT will sign off at this time and defer PROM and positioning to PT. Anticipate need for LTACH placement.  -     Row Name 04/19/22 1430          Therapy Plan Review/Discharge Plan (OT)    Anticipated Discharge Disposition (OT) LT ([facility])  -[facility] Name 04/19/22 1430          Vital Signs    Pre Patient Position Supine  -     Intra Patient Position Supine  -J     Post Patient Position Supine  -J     [facility] Name 04/19/22 1430          Positioning and Restraints    Pre-Treatment Position in bed  -     Post Treatment Position bed  -JJ     In Bed notified nsg;fowlers;side lying left;call light within reach;encouraged to call for assist;side rails up x3;SCD pump applied;R multipodus;L multipodus;RUE elevated;LUE elevated;patient within staff view;LLE elevated;RLE elevated  -           User Key  (r) = Recorded By, (t) = Taken By, (c) = Cosigned By    Initials Name Provider Type      Rina Medley OTR/L, JUWAN Occupational Therapist               Outcome Measures     Row Name 04/19/22 1430          How much help from another is currently needed...    Putting on and taking off regular lower body clothing? 1  -JJ     Bathing (including washing, rinsing, and drying) 1  -JJ     Toileting (which includes using toilet bed pan or urinal) 1  -JJ     Putting on and taking off regular upper body clothing 1  -JJ     Taking care of personal grooming (such as brushing teeth) 1  -JJ     Eating meals 1  -JJ     AM-PAC 6 Clicks Score (OT) 6  -JJ     Row Name 04/19/22 1430          Functional Assessment    Outcome Measure Options AM-PAC 6 Clicks Daily Activity (OT)  -JJ           User Key  (r) = Recorded By, (t) = Taken By, (c) = Cosigned By    Initials Name Provider Type    Rina Titus OTR/L, JUWAN Occupational Therapist              Occupational Therapy Education                 Title: PT OT SLP Therapies (In Progress)     Topic: Occupational Therapy (In Progress)     Point: ADL training (In Progress)     Description:   Instruct learner(s) on proper safety adaptation and remediation techniques during self care or transfers.   Instruct in proper use of assistive devices.              Learning Progress Summary           Patient Acceptance, E, NL by MEHRDAD at 4/19/2022 1455    Acceptance, E,D, VU,NR by  at 4/3/2022 1009                   Point: Home exercise program (Not Started)     Description:   Instruct learner(s) on appropriate technique for monitoring, assisting and/or progressing therapeutic exercises/activities.              Learner Progress:  Not documented in this visit.          Point: Precautions (In Progress)     Description:   Instruct learner(s) on prescribed precautions during self-care and functional transfers.              Learning Progress Summary           Patient Acceptance, E, NL by MEHRDAD at 4/19/2022 1455                   Point: Body mechanics (Not Started)     Description:    Instruct learner(s) on proper positioning and spine alignment during self-care, functional mobility activities and/or exercises.              Learner Progress:  Not documented in this visit.                      User Key     Initials Effective Dates Name Provider Type Discipline     06/16/21 -  Bobbi Nicole, OTR/L Occupational Therapist OT    JJAME 11/10/21 -  Rina Medley OTR/L, CSRS Occupational Therapist OT              OT Recommendation and Plan     Plan of Care Review  Plan of Care Reviewed With: patient  Outcome Evaluation: OT eval order received and completed. Pt is sedated on mechanical vent. She withdrawls all four extremities from painful stimuli. Able to work B UE through full PROM but increased tightness/tone noted at all joints with digits on B hands curled into loose fist. Increased tone noted. Pt placed in B wrist/hand orthotic to prevent joint contracture. RN educated on wear schedule/fitting/mgt and to check for skin breakdown. OT will sign off at this time and defer PROM and positioning to PT. Anticipate need for LTACH placement.  Plan of Care Reviewed With: patient  Outcome Evaluation: OT eval order received and completed. Pt is sedated on mechanical vent. She withdrawls all four extremities from painful stimuli. Able to work B UE through full PROM but increased tightness/tone noted at all joints with digits on B hands curled into loose fist. Increased tone noted. Pt placed in B wrist/hand orthotic to prevent joint contracture. RN educated on wear schedule/fitting/mgt and to check for skin breakdown. OT will sign off at this time and defer PROM and positioning to PT. Anticipate need for LTACH placement.     Time Calculation:    Time Calculation- OT     Row Name 04/19/22 1456             Time Calculation- OT    OT Start Time 1430  -JJ      OT Stop Time 1455  -      OT Time Calculation (min) 25 min  -      OT Received On 04/19/22  -            User Key  (r) = Recorded By, (t) = Taken  By, (c) = Cosigned By    Initials Name Provider Type    Rina Titus OTR/L, JUWAN Occupational Therapist              Therapy Charges for Today     Code Description Service Date Service Provider Modifiers Qty    08630018876 HC OT RE-EVAL 2 4/19/2022 Rina Medley OTR/L, JUWAN GO 1             OT Discharge Summary  Anticipated Discharge Disposition (OT): [facility] ([facility] care hospital)  Reason for Discharge: Unable to participate  Outcomes Achieved: Unable to tolerate or actively participate in therapy  Discharge Destination: LTACH    MARISSA Marion CSRS  4/19/2022

## 2022-04-19 NOTE — PLAN OF CARE
Goal Outcome Evaluation:  Plan of Care Reviewed With: patient           Outcome Evaluation: OT eval order received and completed. Pt is sedated on mechanical vent. She withdrawls all four extremities from painful stimuli. Able to work B UE through full PROM but increased tightness/tone noted at all joints with digits on B hands curled into loose fist. Increased tone noted. Pt placed in B wrist/hand orthotic to prevent joint contracture. RN educated on wear schedule/fitting/mgt and to check for skin breakdown. OT will sign off at this time and defer PROM and positioning to PT. Anticipate need for LTACH placement.

## 2022-04-19 NOTE — CONSULTS
[facility]  INPATIENT WOUND CONSULTATION    Today's Date: 04/19/22    Patient Name: Namita Zabala  MRN: [id number]  CSN: [id number]  PCP: David Lara MD  Referring Provider:   Consulting Provider (From admission, onward)    Start Ordered     Status Ordering Provider    04/18/22 0746 04/18/22 0745  Inpatient Wound Care Provider Consult  Once        Specialty:  Wound Care  Provider:  Migdalia Saha APRN Acknowledged MOORE, JONATHAN SCOTT         Attending Provider: Brody Potter MD  Length of Stay: 15    SUBJECTIVE   Chief Complaint: Possible pressure injury    HPI: Namita Zabala, a 46 y.o.female, presents with a past medical history of angina at rest, migraines, panic disorder and COVID-19 for which she was admitted from 8/27/2021-9/24/2021.  A full past medical history as listed below.  Patient appears to have had multiple complications after COVID-19 complicated by pulmonary embolism requiring anticoagulation and an abdominal hematoma formed.  Patient developed obstruction of ureters which led to renal failure requiring dialysis and has nephrostomy tube. Patient has received care at [facility], [facility], [facility], and nursing home since August with last 3 weeks being at home.  It appears she has lost 50 pounds in the last 6 months.  Patient presented to the hospital on 4/2 via EMS with weakness and vomiting.  Patient was admitted to been treated for severe malnutrition, altered mental status, UTI.  CT scan has revealed a possible hematoma versus abscess of left abductor katie muscle.    Inpatient wound care consulted due to a possible pressure injury of bilateral posterior upper leg/ischial tuberosity.  It appears patient does have a deep tissue injury of the left posterior upper leg that appears to be related to the rectal tube with an area of the right posterior upper leg that is blanchable but linear and may be related as well.   This is a hospital-acquired  pressure injury as this wound was first assessed on 4/17 and patient was admitted on 4/2.        Visit Dx:    ICD-10-CM ICD-9-CM   1. Hypokalemia  E87.6 276.8   2. Urinary tract infection with hematuria, site unspecified  N39.0 599.0    R31.9 599.70   3. Nausea and vomiting, unspecified vomiting type  R11.2 787.01   4. Lactic acidosis  E87.2 276.2   5. Dysphagia, unspecified type  R13.10 787.20   6. Sepsis secondary to UTI ([facility])  A41.9 038.9    N39.0 995.91     599.0     Patient Active Problem List   Diagnosis   • COVID-19   • Acute respiratory failure with hypoxia ([facility])   • Sepsis secondary to UTI ([facility])   • Acute pulmonary embolism ([facility])   • Chronic migraine   • MVP (mitral valve prolapse)   • Bilateral pneumonia   • Lactic acidosis   • Elevated transaminase level   • JUVENAL (acute kidney injury) ([facility])   • Ileus ([facility])   • Hyperkalemia   • Pelvic hematoma, female   • Acute blood loss anemia   • Cytokine release syndrome, grade 4   • Hypokalemia   • Anxiety   • Essential (primary) hypertension   • Myofascial pain syndrome   • Vitamin B12 deficiency   • Intractable nausea and vomiting   • UTI (urinary tract infection), bacterial   • Malfunction of nephrostomy tube ([facility])   • Severe malnutrition (CMS/HCC)   • Hypophosphatemia       History:   Past Medical History:   Diagnosis Date   • Angina at rest ([facility])    • Migraine     Sees Pain Management for injections.    • MVP (mitral valve prolapse)    • Renal disorder    • Syncope      Past Surgical History:   Procedure Laterality Date   • BREAST BIOPSY Right 2011    benign   • INSERTION HEMODIALYSIS CATHETER Left 9/16/2021    Procedure: HEMODIALYSIS CATHETER INSERTION;  Surgeon: Anders Kaur MD;  Location: [place] [address];  Service: Vascular;  Laterality: Left;   • TONSILLECTOMY       Social History     Socioeconomic History   • Marital status:    Tobacco Use   • Smoking status: Never Smoker   • Smokeless tobacco: Never Used   Substance and Sexual Activity    • Alcohol use: No   • Drug use: No     Family History   Problem Relation Age of Onset   • Colon cancer Maternal Aunt 52   • Fibromyalgia Mother    • Heart disease Mother    • Hypertension Mother    • Hodgkin's lymphoma Paternal Grandmother    • Ovarian cancer Neg Hx    • Breast cancer Neg Hx        Allergies:  Allergies   Allergen Reactions   • Coconut Unknown - High Severity   • Nuts Unknown - High Severity   • Penicillins    • [place] Other (See Comments)     Causes migraines per pt reports       Medications:    Current Facility-Administered Medications:   •  ertapenem (INVanz) 1 g in sodium chloride 0.9 % 100 mL IVPB-VTB, 1 g, Intravenous, Q24H, Amado Villarreal MD, Last Rate: 200 mL/hr at 04/18/22 1714, 1 g at 04/18/22 1714  •  famotidine (PEPCID) injection 20 mg, 20 mg, Intravenous, Q12H, Amado Villarreal MD, 20 mg at 04/19/22 0936  •  FLUoxetine (PROzac) capsule 20 mg, 20 mg, Oral, Nightly, Amado Villarreal MD, 20 mg at 04/18/22 2110  •  fluticasone (FLONASE) 50 MCG/ACT nasal spray 2 spray, 2 spray, Each Nare, BID, Frank Ricks MD, 2 spray at 04/19/22 0855  •  folic acid (FOLVITE) tablet 1 mg, 1 mg, Oral, Daily, Amado Villarreal MD, 1 mg at 04/18/22 2110  •  Hold medication, 1 each, Does not apply, Continuous PRN, Abiola Yan MD  •  hydrALAZINE (APRESOLINE) injection 10 mg, 10 mg, Intravenous, Q4H PRN, Amado Villarreal MD  •  labetalol (NORMODYNE,TRANDATE) injection 10 mg, 10 mg, Intravenous, Q4H PRN, Amado Villarreal MD, 10 mg at 04/10/22 1518  •  lactated ringers infusion, 75 mL/hr, Intravenous, Continuous, Amado Villareral MD, Last Rate: 75 mL/hr at 04/19/22 0445, 75 mL/hr at 04/19/22 0445  •  levETIRAcetam in NaCl 0.82% (KEPPRA) IVPB 500 mg, 500 mg, Intravenous, Q12H, Abiola Yan MD, 500 mg at 04/19/22 0855  •  Morphine sulfate (PF) injection 2 mg, 2 mg, Intravenous, Q4H PRN, Amado Villarreal MD  •  neomycin-polymyxin-hydrocortisone (CORTISPORIN) otic suspension 4 drop, 4 drop, Both  Ears, Q6H, Fermin Mcclure DO, 4 drop at 04/19/22 0549  •  norepinephrine (LEVOPHED) 8 mg in 250 mL NS infusion (premix), 0.02-0.3 mcg/kg/min, Intravenous, Titrated, Brody Potter MD, Stopped at 04/18/22 1715  •  ondansetron (ZOFRAN) tablet 4 mg, 4 mg, Oral, Q6H PRN **OR** ondansetron (ZOFRAN) injection 4 mg, 4 mg, Intravenous, Q6H PRN, Frank Ricks MD, 4 mg at 04/16/22 1000  •  potassium chloride (MICRO-K) CR capsule 40 mEq, 40 mEq, Oral, PRN **OR** potassium chloride (KLOR-CON) packet 40 mEq, 40 mEq, Oral, PRN **OR** potassium chloride 10 mEq in 100 mL IVPB, 10 mEq, Intravenous, Q1H PRN, Amado Villarreal MD, Last Rate: 100 mL/hr at 04/19/22 0937, 10 mEq at 04/19/22 0937  •  potassium phosphate 45 mmol in sodium chloride 0.9 % 500 mL infusion, 45 mmol, Intravenous, PRN **OR** potassium phosphate 30 mmol in sodium chloride 0.9 % 250 mL infusion, 30 mmol, Intravenous, PRN, Last Rate: 31.3 mL/hr at 04/16/22 1900, 30 mmol at 04/16/22 1900 **OR** Potassium Phosphates 15 mmol in sodium chloride 0.9 % 100 mL infusion, 15 mmol, Intravenous, PRN **OR** sodium phosphates 45 mmol in sodium chloride 0.9 % 500 mL IVPB, 45 mmol, Intravenous, PRN **OR** sodium phosphates 30 mmol in sodium chloride 0.9 % 250 mL IVPB, 30 mmol, Intravenous, PRN **OR** sodium phosphates 15 mmol in sodium chloride 0.9 % 250 mL IVPB, 15 mmol, Intravenous, PRN, Amado Villarreal MD  •  propofol (DIPRIVAN) infusion 10 mg/mL 100 mL, 5-50 mcg/kg/min, Intravenous, Titrated, Bordy Potter MD, Last Rate: 8.65 mL/hr at 04/19/22 0445, 20 mcg/kg/min at 04/19/22 0445  •  sodium bicarbonate tablet 650 mg, 650 mg, Oral, BID, Amado Villarreal MD, 650 mg at 04/19/22 0855  •  sodium chloride 0.9 % flush 10 mL, 10 mL, Intravenous, PRN, Frank Ricks MD  •  [COMPLETED] Insert peripheral IV, , , Once **AND** sodium chloride 0.9 % flush 10 mL, 10 mL, Intravenous, PRN, Frank Ricks MD  •  [COMPLETED] Insert peripheral IV, , , Once **AND**  sodium chloride 0.9 % flush 10 mL, 10 mL, Intravenous, PRN, Frank Ricks MD  •  sodium chloride 0.9 % flush 10 mL, 10 mL, Intravenous, Q12H, Frank Ricks MD, 10 mL at 04/19/22 0856  •  sodium chloride 0.9 % flush 10 mL, 10 mL, Intravenous, PRN, Frank Ricks MD, 10 mL at 04/10/22 1518    Review of Systems:   Review of Systems   Unable to perform ROS: Intubated         OBJECTIVE     Vitals:    04/19/22 0815   BP: 94/67   Pulse: 91   Resp:    Temp:    SpO2: 100%       PHYSICAL EXAM  Physical Exam  Vitals and nursing note reviewed.   Constitutional:       Appearance: She is ill-appearing.      Interventions: She is sedated and intubated.   HENT:      Head: Normocephalic and atraumatic.   Eyes:      General: Lids are normal.         Right eye: No discharge.         Left eye: No discharge.   Cardiovascular:      Rate and Rhythm: Normal rate and regular rhythm.   Pulmonary:      Effort: No respiratory distress. She is intubated.   Abdominal:      General: There is no distension.      Palpations: Abdomen is soft.   Musculoskeletal:      Cervical back: No edema or erythema.   Feet:      Right foot:      Skin integrity: Skin integrity normal. No ulcer.      Left foot:      Skin integrity: Skin integrity normal. No ulcer.   Skin:     General: Skin is warm and dry.      Findings: Erythema and wound present.      Comments: Deep tissue injury of the left posterior upper thigh.  Wound base is purple and nonblanchable.  No break in skin at this time.  Erythema present of the periwound area.  No drainage noted.  Linear area of erythema present on the right posterior upper thigh similar to location of deep tissue injury on opposite leg.  This area is blanchable with no breaks in skin.  These wounds appear to be related to rectal tube has a lead across this area with health wound and linear and match the shape of rectal tube is currently in place.   Neurological:      Motor: Weakness present.      Comments: Patient  is sedated and intubated                          Results Review:  Lab Results (last 48 hours)     Procedure Component Value Units Date/Time    Non-gynecologic Cytology [[id number]] Collected: 22 1239    Specimen: Cerebrospinal Fluid from Lumbar Puncture Updated: 22 0920     Case Report --     Non-gynecologic Cytology                          Case: [id number]                                  Authorizing Provider:  Abiola Yan MD Collected:           2022 12:39 PM          Ordering Location:     [facility]     Received:            2022 08:48 AM                                 CARDIAC CARE                                                                 Pathologist:           Soren Dugan MD                                                        Specimen:    Lumbar Puncture, CSF                                                                        Final Diagnosis --     Cerebrospinal fluid, lumbar puncture:  Negative for malignant cells.  Virtually acellular fluid.       Gross Description --     1. Lumbar Puncture.  Received fresh in the laboratory in a container labeled with patient name and  designated as CSF.  2 mls of clear colorless fluid.  2 cytospin slides prepared.         Microscopic Description --     Performed.      Culture, CSF - Cerebrospinal Fluid, Lumbar Puncture [[id number]] Collected: 22    Specimen: Cerebrospinal Fluid from Lumbar Puncture Updated: 22     CSF Culture No growth at 2 days     Gram Stain No WBCs or organisms seen    Blood Gas, Arterial - [[id number]]  (Abnormal) Collected: 22 0435    Specimen: Arterial Blood Updated: 22 043     Site Left Radial     Russell's Test Positive     pH, Arterial 7.453 pH units      Comment: 83 Value above reference range        pCO2, Arterial 35.7 mm Hg      pO2, Arterial 155.0 mm Hg      Comment: 83 Value above reference range        HCO3, Arterial 25.0 mmol/L      Base  Excess, Arterial 1.1 mmol/L      O2 Saturation, Arterial 99.8 %      Comment: 83 Value above reference range        Temperature 37.0 C      Barometric Pressure for Blood Gas 757 mmHg      Modality Ventilator     FIO2 30 %      Ventilator Mode AC     Set Tidal Volume 420     Set Mech Resp Rate 14.0     PEEP 5.0     Collected by [id number]     Comment: Meter: [id number]     :  [id number]        pCO2, Temperature Corrected 35.7 mm Hg      pH, Temp Corrected 7.453 pH Units      pO2, Temperature Corrected 155 mm Hg     Comprehensive Metabolic Panel [[id number]]  (Abnormal) Collected: 04/19/22 0329    Specimen: Blood Updated: 04/19/22 0424     Glucose 91 mg/dL      BUN 6 mg/dL      Creatinine 0.91 mg/dL      Sodium 142 mmol/L      Potassium 3.6 mmol/L      Chloride 108 mmol/L      CO2 24.0 mmol/L      Calcium 8.7 mg/dL      Total Protein 4.5 g/dL      Albumin 2.20 g/dL      ALT (SGPT) 21 U/L      AST (SGOT) 43 U/L      Alkaline Phosphatase 88 U/L      Total Bilirubin 0.6 mg/dL      Globulin 2.3 gm/dL      A/G Ratio 1.0 g/dL      BUN/Creatinine Ratio 6.6     Anion Gap 10.0 mmol/L      eGFR 79.9 mL/min/1.73      Comment: National Kidney Foundation and American Society of Nephrology (ASN) Task Force recommended calculation based on the Chronic Kidney Disease Epidemiology Collaboration (CKD-EPI) equation refit without adjustment for race.       Narrative:      GFR Normal >60  Chronic Kidney Disease <60  Kidney Failure <15      Blood Culture - Blood, Arm, Left [[id number]]  (Normal) Collected: 04/16/22 0346    Specimen: Blood from Arm, Left Updated: 04/19/22 0416     Blood Culture No growth at 3 days    CBC & Differential [[id number]]  (Abnormal) Collected: 04/19/22 0329    Specimen: Blood Updated: 04/19/22 1104    Narrative:      The following orders were created for panel order CBC & Differential.  Procedure                               Abnormality         Status                     ---------                                -----------         ------                     CBC Auto Differential[[id number]]        Abnormal            Final result                 Please view results for these tests on the individual orders.    CBC Auto Differential [[id number]]  (Abnormal) Collected: 04/19/22 0329    Specimen: Blood Updated: 04/19/22 0404     WBC 10.38 10*3/mm3      RBC 2.91 10*6/mm3      Hemoglobin 8.8 g/dL      Hematocrit 25.9 %      MCV 89.0 fL      MCH 30.2 pg      MCHC 34.0 g/dL      RDW 14.8 %      RDW-SD 47.1 fl      MPV 10.6 fL      Platelets 214 10*3/mm3      Neutrophil % 69.8 %      Lymphocyte % 13.9 %      Monocyte % 8.6 %      Eosinophil % 2.7 %      Basophil % 0.5 %      Immature Grans % 4.5 %      Neutrophils, Absolute 7.25 10*3/mm3      Lymphocytes, Absolute 1.44 10*3/mm3      Monocytes, Absolute 0.89 10*3/mm3      Eosinophils, Absolute 0.28 10*3/mm3      Basophils, Absolute 0.05 10*3/mm3      Immature Grans, Absolute 0.47 10*3/mm3      nRBC 0.3 /100 WBC     Blood Culture - Blood, Arm, Left [[id number]]  (Normal) Collected: 04/16/22 0112    Specimen: Blood from Arm, Left Updated: 04/19/22 0147     Blood Culture No growth at 3 days    Blood Culture With AASHISH - Blood, Arm, Right [[id number]]  (Normal) Collected: 04/14/22 1938    Specimen: Blood from Arm, Right Updated: 04/18/22 2018     Blood Culture No growth at 4 days    Blood Gas, Arterial - [[id number]]  (Abnormal) Collected: 04/18/22 1405    Specimen: Arterial Blood Updated: 04/18/22 1400     Site Left Radial     Russell's Test Positive     pH, Arterial 7.518 pH units      Comment: 83 Value above reference range        pCO2, Arterial 32.0 mm Hg      Comment: 84 Value below reference range        pO2, Arterial 134.0 mm Hg      Comment: 83 Value above reference range        HCO3, Arterial 25.9 mmol/L      Base Excess, Arterial 3.0 mmol/L      Comment: 83 Value above reference range        O2 Saturation, Arterial 99.8 %      Comment: 83 Value above reference range         Temperature 37.0 C      Barometric Pressure for Blood Gas 756 mmHg      Modality Ventilator     FIO2 30 %      Ventilator Mode AC     Set Tidal Volume 450     Set Mech Resp Rate 16.0     PEEP 5.0     Collected by [id number]     Comment: Meter: [id number]     :  [id number]        pCO2, Temperature Corrected 32.0 mm Hg      pH, Temp Corrected 7.518 pH Units      pO2, Temperature Corrected 134 mm Hg     AFB Culture - Cerebrospinal Fluid, Lumbar Puncture [[id number]] Collected: 04/17/22 1239    Specimen: Cerebrospinal Fluid from Lumbar Puncture Updated: 04/18/22 1110     AFB Stain No acid fast bacilli seen on direct smear    Meningitis / Encephalitis Panel, PCR - Cerebrospinal Fluid, Lumbar Puncture [[id number]]  (Normal) Collected: 04/17/22 1239    Specimen: Cerebrospinal Fluid from Lumbar Puncture Updated: 04/18/22 1105     ESCHERICHIA COLI K1, PCR Not Detected     HAEMOPHILUS INFLUENZAE, PCR Not Detected     LISTERIA MONOCYTOGENES, PCR Not Detected     NEISSERIA MENINGITIDIS, PCR Not Detected     STREPTOCOCCUS AGALACTIAE, PCR Not Detected     STREPTOCOCCUS PNEUMONIAE, PCR Not Detected     CYTOMEGALOVIRUS (CMV), PCR Not Detected     ENTEROVIRUS, PCR Not Detected     HERPES SIMPLEX VIRUS 1 (HSV-1), PCR Not Detected     HERPES SIMPLEX VIRUS 2 (HSV-2), PCR Not Detected     HUMAN PARECHOVIRUS, PCR Not Detected     VARICELLA ZOSTER VIRUS (VZV), PCR Not Detected     CRYPTOCOCCUS NEOFORMANS / GATTII, PCR Not Detected     HUMAN HERPES VIRUS 6 PCR Not Detected    Narrative:      This test is performed by utilizing real time polymerace chain recation (PCR).   The [employer] ME Panel does not distinguish between latent and active CMV and HHV-6 infections. Detection of these viruses may indicate primary infection, secondary reactivation, or the presence of latent virus. Results should always be interpreted in conjunction with other clinical, laboratory, and epidemiological information.    Paraneoplastic Profile 1, CSF -  Cerebrospinal Fluid, Lumbar Puncture [[id number]] Collected: 04/17/22 1239    Specimen: Cerebrospinal Fluid from Lumbar Puncture Updated: 04/18/22 1021    Manual Differential [[id number]]  (Abnormal) Collected: 04/18/22 0231    Specimen: Blood Updated: 04/18/22 0444     Neutrophil % 88.9 %      Lymphocyte % 4.0 %      Monocyte % 5.1 %      Eosinophil % 1.0 %      Atypical Lymphocyte % 1.0 %      Neutrophils Absolute 9.85 10*3/mm3      Lymphocytes Absolute 0.55 10*3/mm3      Monocytes Absolute 0.57 10*3/mm3      Eosinophils Absolute 0.11 10*3/mm3      nRBC 1.0 /100 WBC      Polychromasia Slight/1+     Target Cells Slight/1+     Vacuolated Neutrophils Slight/1+     Platelet Morphology Normal    CBC & Differential [[id number]]  (Abnormal) Collected: 04/18/22 0231    Specimen: Blood Updated: 04/18/22 0444    Narrative:      The following orders were created for panel order CBC & Differential.  Procedure                               Abnormality         Status                     ---------                               -----------         ------                     CBC Auto Differential[[id number]]        Abnormal            Final result                 Please view results for these tests on the individual orders.    CBC Auto Differential [[id number]]  (Abnormal) Collected: 04/18/22 0231    Specimen: Blood Updated: 04/18/22 0444     WBC 11.08 10*3/mm3      RBC 2.75 10*6/mm3      Hemoglobin 7.9 g/dL      Hematocrit 23.5 %      MCV 85.5 fL      MCH 28.7 pg      MCHC 33.6 g/dL      RDW 14.7 %      RDW-SD 45.5 fl      MPV 10.1 fL      Platelets 185 10*3/mm3     Comprehensive Metabolic Panel [[id number]]  (Abnormal) Collected: 04/18/22 0231    Specimen: Blood Updated: 04/18/22 0416     Glucose 77 mg/dL      BUN 7 mg/dL      Creatinine 0.92 mg/dL      Sodium 140 mmol/L      Potassium 3.4 mmol/L      Comment: Slight hemolysis detected by analyzer. Results may be affected.        Chloride 106 mmol/L      CO2 25.0 mmol/L      Calcium  7.9 mg/dL      Total Protein 4.1 g/dL      Albumin 1.60 g/dL      ALT (SGPT) 17 U/L      AST (SGOT) 45 U/L      Alkaline Phosphatase 74 U/L      Total Bilirubin 0.5 mg/dL      Globulin 2.5 gm/dL      A/G Ratio 0.6 g/dL      BUN/Creatinine Ratio 7.6     Anion Gap 9.0 mmol/L      eGFR 78.9 mL/min/1.73      Comment: National Kidney Foundation and American Society of Nephrology (ASN) Task Force recommended calculation based on the Chronic Kidney Disease Epidemiology Collaboration (CKD-EPI) equation refit without adjustment for race.       Narrative:      GFR Normal >60  Chronic Kidney Disease <60  Kidney Failure <15      Magnesium [[id number]]  (Normal) Collected: 04/18/22 0231    Specimen: Blood Updated: 04/18/22 0416     Magnesium 1.9 mg/dL     CBC (No Diff) [[id number]]  (Abnormal) Collected: 04/17/22 1417    Specimen: Blood Updated: 04/17/22 1434     WBC 12.54 10*3/mm3      RBC 2.96 10*6/mm3      Hemoglobin 8.8 g/dL      Hematocrit 25.1 %      MCV 84.8 fL      MCH 29.7 pg      MCHC 35.1 g/dL      RDW 14.9 %      RDW-SD 45.8 fl      MPV 9.6 fL      Platelets 162 10*3/mm3     Cryptococcal AG, CSF - Cerebrospinal Fluid, Lumbar Puncture [[id number]]  (Normal) Collected: 04/17/22 1239    Specimen: Cerebrospinal Fluid from Lumbar Puncture Updated: 04/17/22 1403     Cryptococcal Antigen, CSF Negative    Protein, CSF - Cerebrospinal Fluid, Lumbar Puncture [[id number]]  (Normal) Collected: 04/17/22 1239    Specimen: Cerebrospinal Fluid from Lumbar Puncture Updated: 04/17/22 1321     Protein, Total (CSF) 34.5 mg/dL     Glucose, CSF - Cerebrospinal Fluid, Lumbar Puncture [[id number]]  (Normal) Collected: 04/17/22 1239    Specimen: Cerebrospinal Fluid from Lumbar Puncture Updated: 04/17/22 1321     Glucose, CSF 65 mg/dL     Cell Count With Differential, CSF [[id number]]  (Abnormal) Collected: 04/17/22 1239    Specimen: Cerebrospinal Fluid from Lumbar Puncture Updated: 04/17/22 1313    Narrative:      The following orders were created  for panel order Cell Count With Differential, CSF.  Procedure                               Abnormality         Status                     ---------                               -----------         ------                     Cell Count, CSF - Cerebr...[[id number]]  Abnormal            Final result                 Please view results for these tests on the individual orders.    Cell Count, CSF - Cerebrospinal Fluid, Lumbar Puncture [[id number]]  (Abnormal) Collected: 04/17/22 1239    Specimen: Cerebrospinal Fluid from Lumbar Puncture Updated: 04/17/22 1313     Color, CSF --     Comment: All tubes colorlass        Supernatant Color, CSF Colorless     Appearance, CSF --     Comment: All tubes clear        RBC, CSF 4 /mm3      Nucleated Cells, CSF 1 /mm3      Volume, CSF 10.0 mL      Tube Number, CSF 4     Method: Hemacytometer Method    Narrative:      Differential not indicated.    Cell Count With Differential, CSF Use CSF Tube: 1 [[id number]]  (Abnormal) Collected: 04/17/22 1239    Specimen: Cerebrospinal Fluid from Lumbar Puncture Updated: 04/17/22 1306    Narrative:      The following orders were created for panel order Cell Count With Differential, CSF Use CSF Tube: 1.  Procedure                               Abnormality         Status                     ---------                               -----------         ------                     Cell Count, CSF - Cerebr...[[id number]]  Abnormal            Final result                 Please view results for these tests on the individual orders.    Cell Count, CSF - Cerebrospinal Fluid, Lumbar Puncture [[id number]]  (Abnormal) Collected: 04/17/22 1239    Specimen: Cerebrospinal Fluid from Lumbar Puncture Updated: 04/17/22 1306     Color, CSF --     Comment: All tubes colorless        Supernatant Color, CSF Colorless     Appearance, CSF --     Comment: All tubes clear        RBC, CSF 7 /mm3      Nucleated Cells, CSF 1 /mm3      Volume, CSF 10.0 mL      Tube Number, CSF 1      Method: Hemacytometer Method    Narrative:      Differential not indicated.    Anaerobic Culture - Cerebrospinal Fluid, Spine, Lumbar [[id number]] Collected: 04/17/22 1239    Specimen: Cerebrospinal Fluid from Spine, Lumbar Updated: 04/17/22 1253    Fungus Culture - Cerebrospinal Fluid, Lumbar Puncture [[id number]] Collected: 04/17/22 1239    Specimen: Cerebrospinal Fluid from Lumbar Puncture Updated: 04/17/22 1252    Urine Culture - Urine, Urine, Catheter In/Out [[id number]]  (Abnormal)  (Susceptibility) Collected: 04/14/22 1025    Specimen: Urine, Catheter In/Out Updated: 04/17/22 1129     Urine Culture >100,000 CFU/mL Klebsiella oxytoca ESBL     Comment: Consider infectious disease consult.  Susceptibility results may not correlate to clinical outcomes.       Susceptibility      Klebsiella oxytoca ESBL      SANDEEP      Amikacin Susceptible      Ertapenem Susceptible      Gentamicin Resistant     Levofloxacin Intermediate      Meropenem Susceptible      Nitrofurantoin Susceptible      Piperacillin + Tazobactam Susceptible      Tobramycin Intermediate      Trimethoprim + Sulfamethoxazole Susceptible                    Linear View                       Imaging Results (Last 72 Hours)     Procedure Component Value Units Date/Time    XR Chest 1 View [[id number]] Collected: 04/19/22 0839     Updated: 04/19/22 0845    Narrative:      EXAMINATION: XR CHEST 1 VW- 4/19/2022 8:39 AM CDT     HISTORY: vent check; E87.6-Hypokalemia; N39.0-Urinary tract infection,  site not specified; R31.9-Hematuria, unspecified; R11.2-Nausea with  vomiting, unspecified; E87.2-Acidosis; R13.10-Dysphagia, unspecified;  A41.9-Sepsis, unspecified organism; N39.0-Urinary tract infection, site  not specified.     REPORT: A frontal view of the chest was obtained.     COMPARISON: Chest x-ray 4/18/2022 1300 hours.     Endotracheal tube projects over the midline trachea with tip  approximately 15 mm superior to the jaime, satisfactory and stable. The  tip of  the NG tube projects over the gastric fundus as before. There is  partial visualization of the internal and external left nephrostomy  tube, oriented vertically over the left lower chest and upper abdomen.  There is mild atelectasis in the lung bases greater on the left. No lung  consolidation is identified. There is no pneumothorax or pleural  effusion. No acute osseous findings. Mild levoscoliosis of the thoracic  spine.       Impression:      Stable 1 day appearance of the chest.  This report was finalized on 04/19/2022 08:42 by Dr. Florian Laurent MD.    MRI Brain With & Without Contrast [[id number]] Collected: 04/18/22 1738     Updated: 04/18/22 1752    Narrative:      HISTORY: Unresponsive, improving kidney function     MRI BRAIN: Multiplanar imaging the brain performed pre- and post-IV  contrast.     COMPARISON: Nonenhanced MRI brain 4/16/2022     FINDINGS: There is hyperintense T2 FLAIR signal symmetrically along the  bilateral frontal parietal and occipital lobes. Increased signal on  diffusion-weighted images most likely T2 shine through artifact there is  no abnormal enhancement identified. Minimal hyperintense FLAIR signal  within the periventricular regions with few hyperintense punctate FLAIR  signal within the subcortical frontal lobes. No abnormal extra-axial  fluid collection.     Limited assessment of the orbits and base of skull unremarkable.       Impression:      1. No abnormal enhancement identified within the region of hyperintense  T2/FLAIR signal involving the bilateral frontal,parietal and occipital  cortical regions. 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 No abnormal enhancement identified.  This report was finalized on 04/18/2022 17:49 by Dr. Rina Ricardo MD.    XR Chest 1 View [[id number]] Collected: 04/18/22 1403     Updated: 04/18/22 1410    Narrative:      EXAMINATION: XR CHEST 1 VW- 4/18/2022 2:03 PM CDT     HISTORY: Intubation;  E87.6-Hypokalemia; N39.0-Urinary tract infection,  site not specified; R31.9-Hematuria, unspecified; R11.2-Nausea with  vomiting, unspecified; E87.2-Acidosis; R13.10-Dysphagia, unspecified;  A41.9-Sepsis, unspecified organism; N39.0-Urinary tract infection, site  not specified.     REPORT: A frontal view the chest was obtained.     COMPARISON: Chest x-ray 2/20/2022.     The patient is now intubated, with endotracheal tube in satisfactory  position with tip approximately 1.4 cm superior to the jaime. An NG  tube has been inserted, tip is at the gastric fundus level. There is  partial visualization of the left-sided external/internal percutaneous  nephrostomy nephrostomy catheter, the tip is shown to be over the  midline exterior to the patient on previous CT of the abdomen on  4/15/2022. There is mild atelectasis in the left lung base. No lung  consolidation is identified. Heart size is normal. No pneumothorax or  pleural effusion is identified.       Impression:      Satisfactory position of the endotracheal tube and NG tube.  Mild volume loss in the lung bases, greater on the left. No lung  consolidation is identified. There is no pneumothorax or pleural  effusion.        This report was finalized on 04/18/2022 14:07 by Dr. Florian Laurent MD.    IR LUMBAR PUNCTURE DIAGNOSTIC [[id number]] Collected: 04/17/22 1300     Updated: 04/17/22 1304    Narrative:      EXAM: IR LUMBAR PUNCTURE DIAGNOSTIC- - 4/17/2022 12:06 PM CDT     HISTORY: unresponsive and abnormal mri; E87.6-Hypokalemia; N39.0-Urinary  tract infection, site not specified; R31.9-Hematuria, unspecified;  R11.2-Nausea with vomiting, unspecified; E87.2-Acidosis;  R13.10-Dysphagia, unspecified; A41.9-Sepsis, unspecified organism;  N39.0-Urinary tract infection, site not specified       COMPARISON: None.      TECHNIQUE: Fluoroscopy-guided lumbar puncture for CSF.  Number of images: 2  Fluoroscopy time: 20 seconds  Dose: 13.8 mGy     PROCEDURE:   After  discussing risks, benefits and alternatives of the procedure with  the patient's family, written consent was obtained.      A timeout was performed including the patient's name, date of birth and  procedure to be performed.     The patient was prepped and draped in sterile fashion. 1 percent  lidocaine was administered for local anesthesia.     Under fluoroscopic guidance, a 20-gauge needle was advanced into the  thecal sac at the L3-L4 level. 10 mL of clear fluid was removed. The  patient tolerated the procedure well, and there were no immediate  complications.      The CSF was sent for analysis per referring clinician's orders.     Opening pressure: 13.8 cm of water       Impression:      Successful fluoroscopic-guided lumbar puncture as described. 10 mL clear  CSF sent to the laboratory..  This report was finalized on 04/17/2022 13:00 by Dr. Gomez Cárdenas MD.    MRI Brain Without Contrast [[id number]] Collected: 04/16/22 1842     Updated: 04/16/22 1850    Narrative:      EXAMINATION:   MRI BRAIN WO CONTRAST-  4/16/2022 6:42 PM CDT     HISTORY: MRI brain without contrast 4/16/2022 6:00 PM CDT     History: Unresponsive staring acute neuro change; E87.6-Hypokalemia;  N39.0-Urinary tract infection, site not specified; R31.9-Hematuria,  unspecified; R11.2-Nausea with vomiting, unspecified; E87.2-Acidosis;  R13.10-Dysphagia, unspecified; A41.9-Sepsis, unspecified organism;  N39.0-Urinary tract infection, site not specified     Comparison: None.       Technique: Multiplanar imaging of the brain was performed in a routine  fashion.      Findings:   Midline structures are nondisplaced. Ventricular system is normal. There  is no significant mass effect or hydrocephalus. Basilar cisterns are  preserved. Abnormal restricted diffusion is noted bilaterally in the  occipital lobes.. No abnormal extra axial fluid collections are noted.  Restricted diffusion is noted which is subcortical in the bilateral  parietal cortex. This  may be an encephalitis. The FLAIR sequence is not  as intense as the restricted diffusion.     Mild cortical volume loss is noted..      Proximal cervical spinal cord, brainstem, and cerebellum are  unremarkable. Normal cerebrovascular flow voids are seen. Bilateral  globes and orbits are normal in appearance.     No abnormal signal is noted in the mastoid air cells or paranasal  sinuses.        Impression:      Impression:   Abnormal signal with restricted diffusion in the bilateral occipital and  also subcortical bilateral parietal cortex. Most likely this is an  encephalitis.         This report was finalized on 04/16/2022 18:47 by Dr. Nadeem Marshall MD.    CT Head Without Contrast [[id number]] Collected: 04/16/22 1150     Updated: 04/16/22 1156    Narrative:      EXAM/TECHNIQUE: CT HEAD WO CONTRAST-     INDICATION: Mental status change, generalized weakness, dizziness     COMPARISON: 04/11/2022     DLP: 643 mGy cm. Automated exposure control was also utilized to  decrease patient radiation dose.     FINDINGS:     No evidence of acute intracranial hemorrhage. No loss of gray-white  differentiation. No midline shift or mass effect. Lateral ventricles are  nondilated. Basilar cisterns are patent. No acute orbital finding.  Mastoid air cells are clear. Visualized paranasal sinuses are clear.  Partially imaged nasogastric tube. No acute osseous finding.       Impression:         No acute intracranial findings.  This report was finalized on 04/16/2022 11:53 by Dr. Gomez Cárdenas MD.    XR Abdomen KUB [[id number]] Collected: 04/16/22 1100     Updated: 04/16/22 1105    Narrative:      EXAM: XR ABDOMEN KUB-     INDICATION: NG placement; E87.6-Hypokalemia; N39.0-Urinary tract  infection, site not specified; R31.9-Hematuria, unspecified;  R11.2-Nausea with vomiting, unspecified; E87.2-Acidosis;  R13.10-Dysphagia, unspecified; A41.9-Sepsis, unspecified organism;  N39.0-Urinary tract infection, site not specified      COMPARISON: None available.     FINDINGS:     Nasogastric tube tip projects over the expected location of the gastric  fundus. A left-sided nephrostomy tube/ureteral stent is in place. There  is marked gaseous distention of the stomach, small bowel, and colon.  There is a transverse colon measures up to 8 mm in diameter. Small bowel  loops measure up to 3.37 m in diameter. A RIGHT femoral venous line is  noted.       Impression:         1.  Enteric tube projects over the proximal stomach.  2.  Marked distention of the stomach, small bowel, and colon. Favor  ileus.  This report was finalized on 04/16/2022 11:02 by Dr. Gomez Cárdenas MD.             ASSESSMENT/PLAN       Examination and evaluation of wound(s) was performed.    DIAGNOSIS:   Deep tissue injury of left posterior upper thigh  At high risk for skin breakdown  Sedation related medication  Severe malnutrition  Sepsis secondary to UTI  Urinary incontinence  Impaired mobility        PLAN:   -All medical devices are to be rotated at least every 2 hours.  Pad any medical device-to-skin contact areas using dressings or linen to protect skin.  - Elevate heels off of bed  - Turn patient Q2H  - Use repositioning sheet and wedges to position patient  - [employer]  - Apply moisture barrier after any incontinence   -Zguard            This document has been electronically signed by BERT Figueroa on 4/19/2022 09:42 CDT

## 2022-04-19 NOTE — PLAN OF CARE
VSS. Sedation vacation performed with Dr. Foster. Patient withdrew in every extremity equally except the VANGIE. The patient would grimace with pain in the VANGIE but not much movement was seen. Dr. Jose saw the patient and wants to leave NG to [facility] as this time and obtain a KUB in the morning. PT/OT orders placed and wrist braces placed per OT. They are to be worn during dayshift and off during night shift. Turned q2h.

## 2022-04-19 NOTE — PROGRESS NOTES
[facility]  INPATIENT PROGRESS NOTE    Patient Name: Namita Zabala  Date of Admission: 4/2/2022  Today's Date: 04/19/22  Length of Stay: 15  Primary Care Physician: David Lara MD    Subjective   Chief Complaint: AMS  HPI   Intubated/Sedated  Afebrile        Review of Systems   Unable to perform ROS: Mental status change        All pertinent negatives and positives are as above. All other systems have been reviewed and are negative unless otherwise stated.     Objective    Temp:  [95.3 °F (35.2 °C)-99 °F (37.2 °C)] 98.2 °F (36.8 °C)  Heart Rate:  [] 90  Resp:  [14-17] 15  BP: ()/(52-96) 93/55  FiO2 (%):  [30 %-40 %] 30 %  Physical Exam  Constitutional:       Appearance: Normal appearance. She is well-developed.      Interventions: She is sedated and intubated.   HENT:      Head: Normocephalic and atraumatic.      Right Ear: Tympanic membrane, ear canal and external ear normal.      Left Ear: Tympanic membrane, ear canal and external ear normal.      Nose: Nose normal.      Mouth/Throat:      Mouth: Mucous membranes are moist.      Pharynx: Oropharynx is clear.   Eyes:      Extraocular Movements: Extraocular movements intact.      Conjunctiva/sclera: Conjunctivae normal.      Pupils: Pupils are equal, round, and reactive to light.   Cardiovascular:      Rate and Rhythm: Normal rate and regular rhythm.      Heart sounds: Normal heart sounds.   Pulmonary:      Effort: Pulmonary effort is normal. She is intubated.      Breath sounds: Normal breath sounds.   Abdominal:      General: Bowel sounds are normal. There is no distension.      Palpations: Abdomen is soft.      Tenderness: There is no abdominal tenderness.   Musculoskeletal:         General: Normal range of motion.      Cervical back: Normal range of motion and neck supple.   Skin:     General: Skin is warm and dry.             Results Review:  I have reviewed the labs, radiology  results, and diagnostic studies.    Laboratory Data:   Results from last 7 days   Lab Units 04/19/22  0329 04/18/22  0231 04/17/22  1417   WBC 10*3/mm3 10.38 11.08* 12.54*   HEMOGLOBIN g/dL 8.8* 7.9* 8.8*   HEMATOCRIT % 25.9* 23.5* 25.1*   PLATELETS 10*3/mm3 214 185 162        Results from last 7 days   Lab Units 04/19/22  0329 04/18/22  0231 04/17/22  0357   SODIUM mmol/L 142 140 140   POTASSIUM mmol/L 3.6 3.4* 3.8   CHLORIDE mmol/L 108* 106 104   CO2 mmol/L 24.0 25.0 25.0   BUN mg/dL 6 7 11   CREATININE mg/dL 0.91 0.92 1.33*   CALCIUM mg/dL 8.7 7.9* 7.5*   BILIRUBIN mg/dL 0.6 0.5 0.4   ALK PHOS U/L 88 74 63   ALT (SGPT) U/L 21 17 15   AST (SGOT) U/L 43* 45* 28   GLUCOSE mg/dL 91 77 126*       Culture Data:   Blood Culture   Date Value Ref Range Status   04/16/2022 No growth at 2 days  Preliminary   04/16/2022 No growth at 2 days  Preliminary   04/14/2022 No growth at 3 days  Preliminary     Urine Culture   Date Value Ref Range Status   04/14/2022 >100,000 CFU/mL Klebsiella oxytoca ESBL (A)  Final     Comment:     Consider infectious disease consult.  Susceptibility results may not correlate to clinical outcomes.       Radiology Data:   Imaging Results (Last 24 Hours)     Procedure Component Value Units Date/Time    MRI Brain With & Without Contrast [[id number]] Collected: 04/18/22 1738     Updated: 04/18/22 1752    Narrative:      HISTORY: Unresponsive, improving kidney function     MRI BRAIN: Multiplanar imaging the brain performed pre- and post-IV  contrast.     COMPARISON: Nonenhanced MRI brain 4/16/2022     FINDINGS: There is hyperintense T2 FLAIR signal symmetrically along the  bilateral frontal parietal and occipital lobes. Increased signal on  diffusion-weighted images most likely T2 shine through artifact there is  no abnormal enhancement identified. Minimal hyperintense FLAIR signal  within the periventricular regions with few hyperintense punctate FLAIR  signal within the subcortical frontal lobes. No  abnormal extra-axial  fluid collection.     Limited assessment of the orbits and base of skull unremarkable.       Impression:      1. No abnormal enhancement identified within the region of hyperintense  T2/FLAIR signal involving the bilateral frontal,parietal and occipital  cortical regions. 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 No abnormal enhancement identified.  This report was finalized on 04/18/2022 17:49 by Dr. Rina Ricardo MD.    XR Chest 1 View [[id number]] Collected: 04/18/22 1403     Updated: 04/18/22 1410    Narrative:      EXAMINATION: XR CHEST 1 VW- 4/18/2022 2:03 PM CDT     HISTORY: Intubation; E87.6-Hypokalemia; N39.0-Urinary tract infection,  site not specified; R31.9-Hematuria, unspecified; R11.2-Nausea with  vomiting, unspecified; E87.2-Acidosis; R13.10-Dysphagia, unspecified;  A41.9-Sepsis, unspecified organism; N39.0-Urinary tract infection, site  not specified.     REPORT: A frontal view the chest was obtained.     COMPARISON: Chest x-ray 2/20/2022.     The patient is now intubated, with endotracheal tube in satisfactory  position with tip approximately 1.4 cm superior to the jaime. An NG  tube has been inserted, tip is at the gastric fundus level. There is  partial visualization of the left-sided external/internal percutaneous  nephrostomy nephrostomy catheter, the tip is shown to be over the  midline exterior to the patient on previous CT of the abdomen on  4/15/2022. There is mild atelectasis in the left lung base. No lung  consolidation is identified. Heart size is normal. No pneumothorax or  pleural effusion is identified.       Impression:      Satisfactory position of the endotracheal tube and NG tube.  Mild volume loss in the lung bases, greater on the left. No lung  consolidation is identified. There is no pneumothorax or pleural  effusion.        This report was finalized on 04/18/2022 14:07 by Dr. Florian Laurent MD.          I  have reviewed the patient's current medications.     Assessment/Plan     Active Hospital Problems    Diagnosis    • **Intractable nausea and vomiting    • Severe malnutrition (CMS/HCC)    • Hypophosphatemia    • Hypokalemia    • UTI (urinary tract infection), bacterial    • Malfunction of nephrostomy tube (HCC)    • Lactic acidosis    • Sepsis secondary to UTI (HCC)    • Essential (primary) hypertension      1.  AMS:  Metabolic Encephalopathy vs Encephalitis  -Encephalitis panel pending  -Paraneoplastic panel pending  -Neuro following  -on Empiric Keppra    2.  Thigh hematoma  -AC held  -Surgery    3.  PE  -will get duplex  -will discuss with surgery if/when ok to resume AC    4.  ESBL UTI  -IV Abx  -ID following    5.  HTN  -monitor    6.  History of bladder rupture  -continue stratton    7.  Presence of Nephrostomy tubes  -monitor  -will need removal at some point, can be done as outpatient    DVT PPX:  SCD's      Discharge Planning: Transfer arrangements pending.    Electronically signed by Brody Potter MD, 04/19/22, 07:55 CDT.

## 2022-04-19 NOTE — PROGRESS NOTES
Neurology Progress Note      Date of admission: 4/2/2022  8:46 PM  Date of visit: 4/19/2022    Chief Complaint:  F/u encephalopathy    Subjective     Subjective:  Patient intubated yesterday for airway protection. Withdraws in all 4 extremities  No seizure activity seen   Medications:  Current Facility-Administered Medications   Medication Dose Route Frequency Provider Last Rate Last Admin   • ertapenem (INVanz) 1 g in sodium chloride 0.9 % 100 mL IVPB-VTB  1 g Intravenous Q24H Amado Villarreal  mL/hr at 04/18/22 1714 1 g at 04/18/22 1714   • famotidine (PEPCID) injection 20 mg  20 mg Intravenous Q12H Amado Villarreal MD   20 mg at 04/18/22 2110   • FLUoxetine (PROzac) capsule 20 mg  20 mg Oral Nightly Amado Vilalrreal MD   20 mg at 04/18/22 2110   • fluticasone (FLONASE) 50 MCG/ACT nasal spray 2 spray  2 spray Each Nare BID Frank Ricks MD   2 spray at 04/19/22 0855   • folic acid (FOLVITE) tablet 1 mg  1 mg Oral Daily Amado Villarreal MD   1 mg at 04/18/22 2110   • Hold medication  1 each Does not apply Continuous PRN Abiola Yan MD       • hydrALAZINE (APRESOLINE) injection 10 mg  10 mg Intravenous Q4H PRN Amado Villarreal MD       • labetalol (NORMODYNE,TRANDATE) injection 10 mg  10 mg Intravenous Q4H PRN Amado Villarreal MD   10 mg at 04/10/22 1518   • lactated ringers infusion  75 mL/hr Intravenous Continuous Amado Villarreal MD 75 mL/hr at 04/19/22 0445 75 mL/hr at 04/19/22 0445   • levETIRAcetam in NaCl 0.82% (KEPPRA) IVPB 500 mg  500 mg Intravenous Q12H Abiola Yan MD   500 mg at 04/19/22 0855   • Morphine sulfate (PF) injection 2 mg  2 mg Intravenous Q4H PRN Amado Villarreal MD       • neomycin-polymyxin-hydrocortisone (CORTISPORIN) otic suspension 4 drop  4 drop Both Ears Q6H Fermin Mcclure, DO   4 drop at 04/19/22 0549   • norepinephrine (LEVOPHED) 8 mg in 250 mL NS infusion (premix)  0.02-0.3 mcg/kg/min Intravenous Titrated Brody Potter MD   Stopped at 04/18/22  1715   • ondansetron (ZOFRAN) tablet 4 mg  4 mg Oral Q6H PRN Frank Ricks MD        Or   • ondansetron (ZOFRAN) injection 4 mg  4 mg Intravenous Q6H PRN Frank Ricks MD   4 mg at 04/16/22 1000   • potassium chloride (MICRO-K) CR capsule 40 mEq  40 mEq Oral PRN Amado Villarreal MD        Or   • potassium chloride (KLOR-CON) packet 40 mEq  40 mEq Oral PRN Amado Villarreal MD        Or   • potassium chloride 10 mEq in 100 mL IVPB  10 mEq Intravenous Q1H PRN Amado Villarreal  mL/hr at 04/19/22 0732 10 mEq at 04/19/22 0732   • potassium phosphate 45 mmol in sodium chloride 0.9 % 500 mL infusion  45 mmol Intravenous PRN Amado Villarreal MD        Or   • potassium phosphate 30 mmol in sodium chloride 0.9 % 250 mL infusion  30 mmol Intravenous PRN Amado Villarreal MD 31.3 mL/hr at 04/16/22 1900 30 mmol at 04/16/22 1900    Or   • Potassium Phosphates 15 mmol in sodium chloride 0.9 % 100 mL infusion  15 mmol Intravenous PRN Amado Villarreal MD        Or   • sodium phosphates 45 mmol in sodium chloride 0.9 % 500 mL IVPB  45 mmol Intravenous PRN Amado Villarreal MD        Or   • sodium phosphates 30 mmol in sodium chloride 0.9 % 250 mL IVPB  30 mmol Intravenous PRN Amado Villarreal MD        Or   • sodium phosphates 15 mmol in sodium chloride 0.9 % 250 mL IVPB  15 mmol Intravenous PRN Amado Villarreal MD       • propofol (DIPRIVAN) infusion 10 mg/mL 100 mL  5-50 mcg/kg/min Intravenous Titrated Brody Potter MD 8.65 mL/hr at 04/19/22 0445 20 mcg/kg/min at 04/19/22 0445   • sodium bicarbonate tablet 650 mg  650 mg Oral BID Amado Villarreal MD   650 mg at 04/19/22 0855   • sodium chloride 0.9 % flush 10 mL  10 mL Intravenous PRN Frank Ricks MD       • sodium chloride 0.9 % flush 10 mL  10 mL Intravenous PRN Frank Ricks MD       • sodium chloride 0.9 % flush 10 mL  10 mL Intravenous PRN Frank Ricks MD       • sodium chloride 0.9 % flush 10 mL  10 mL Intravenous Q12H Frank Ricks MD   10  mL at 04/19/22 0856   • sodium chloride 0.9 % flush 10 mL  10 mL Intravenous PRN Frank Ricks MD   10 mL at 04/10/22 1518       Review of Systems:   -A 14 point review of systems is unobtainable as she is unresponsive  Objective     Objective      Vital Signs  Temp:  [95.3 °F (35.2 °C)-98.7 °F (37.1 °C)] 98.2 °F (36.8 °C)  Heart Rate:  [] 91  Resp:  [14-17] 15  BP: ()/(52-96) 94/67  FiO2 (%):  [30 %-40 %] 30 %    Physical Exam:    HEENT:  Neck supple  CVS:  Regular rate and rhythm.  No murmurs  Carotid Examination:  No bruits  Lungs:  Clear to auscultation  Abdomen:  Non-tender, Non-distended  Extremities:  No signs of peripheral edema    Neurologic Exam:  Propofol held 25 minutes before examining    -Unresponsive and intubated  May open eyes to stimulation  -Does not follows any commands    Cranial nerves II through XII intact.  Pupils react  No response to visual threat  No doll eyes  Grimaces  And frontalis symmetric  Minimal movement lower face bilateral  Seems to hear  Breathes above vent and coughs when suctioned    Motor: (strength out of 5:  1= minimal movement, 2 = movement in plane of gravity, 3 = movement against gravity, 4 = movement against some resistance, 5 = full strength)    Tone better in both upper extremities compared to yesterday but can change  -right now some iincrease tone only distal to wrist bilaterally  Unable to manually dorsiflex the left leg   Withdraws all 4 extremities    DTR:  3+ throughout in upper extremities  Absent in lower extremities    Sensory:  Only responds to noxious stimuli    Coordination/Gait:  -No spontaneous movement to assess     Results Review:    I reviewed the patient's new clinical results.    Lab Results (last 24 hours)     Procedure Component Value Units Date/Time    Culture, CSF - Cerebrospinal Fluid, Lumbar Puncture [[id number]] Collected: 04/17/22 1239    Specimen: Cerebrospinal Fluid from Lumbar Puncture Updated: 04/19/22 0842     CSF  Culture No growth at 2 days     Gram Stain No WBCs or organisms seen    Blood Gas, Arterial - [[id number]]  (Abnormal) Collected: 04/19/22 0435    Specimen: Arterial Blood Updated: 04/19/22 0431     Site Left Radial     Russell's Test Positive     pH, Arterial 7.453 pH units      Comment: 83 Value above reference range        pCO2, Arterial 35.7 mm Hg      pO2, Arterial 155.0 mm Hg      Comment: 83 Value above reference range        HCO3, Arterial 25.0 mmol/L      Base Excess, Arterial 1.1 mmol/L      O2 Saturation, Arterial 99.8 %      Comment: 83 Value above reference range        Temperature 37.0 C      Barometric Pressure for Blood Gas 757 mmHg      Modality Ventilator     FIO2 30 %      Ventilator Mode AC     Set Tidal Volume 420     Set Mech Resp Rate 14.0     PEEP 5.0     Collected by [id number]     Comment: Meter: [id number]     :  [id number]        pCO2, Temperature Corrected 35.7 mm Hg      pH, Temp Corrected 7.453 pH Units      pO2, Temperature Corrected 155 mm Hg     Comprehensive Metabolic Panel [[id number]]  (Abnormal) Collected: 04/19/22 0329    Specimen: Blood Updated: 04/19/22 0424     Glucose 91 mg/dL      BUN 6 mg/dL      Creatinine 0.91 mg/dL      Sodium 142 mmol/L      Potassium 3.6 mmol/L      Chloride 108 mmol/L      CO2 24.0 mmol/L      Calcium 8.7 mg/dL      Total Protein 4.5 g/dL      Albumin 2.20 g/dL      ALT (SGPT) 21 U/L      AST (SGOT) 43 U/L      Alkaline Phosphatase 88 U/L      Total Bilirubin 0.6 mg/dL      Globulin 2.3 gm/dL      A/G Ratio 1.0 g/dL      BUN/Creatinine Ratio 6.6     Anion Gap 10.0 mmol/L      eGFR 79.9 mL/min/1.73      Comment: National Kidney Foundation and American Society of Nephrology (ASN) Task Force recommended calculation based on the Chronic Kidney Disease Epidemiology Collaboration (CKD-EPI) equation refit without adjustment for race.       Narrative:      GFR Normal >60  Chronic Kidney Disease <60  Kidney Failure <15      Blood Culture - Blood, Arm,  Left [[id number]]  (Normal) Collected: 04/16/22 0346    Specimen: Blood from Arm, Left Updated: 04/19/22 0416     Blood Culture No growth at 3 days    CBC & Differential [[id number]]  (Abnormal) Collected: 04/19/22 0329    Specimen: Blood Updated: 04/19/22 0404    Narrative:      The following orders were created for panel order CBC & Differential.  Procedure                               Abnormality         Status                     ---------                               -----------         ------                     CBC Auto Differential[[id number]]        Abnormal            Final result                 Please view results for these tests on the individual orders.    CBC Auto Differential [[id number]]  (Abnormal) Collected: 04/19/22 0329    Specimen: Blood Updated: 04/19/22 0404     WBC 10.38 10*3/mm3      RBC 2.91 10*6/mm3      Hemoglobin 8.8 g/dL      Hematocrit 25.9 %      MCV 89.0 fL      MCH 30.2 pg      MCHC 34.0 g/dL      RDW 14.8 %      RDW-SD 47.1 fl      MPV 10.6 fL      Platelets 214 10*3/mm3      Neutrophil % 69.8 %      Lymphocyte % 13.9 %      Monocyte % 8.6 %      Eosinophil % 2.7 %      Basophil % 0.5 %      Immature Grans % 4.5 %      Neutrophils, Absolute 7.25 10*3/mm3      Lymphocytes, Absolute 1.44 10*3/mm3      Monocytes, Absolute 0.89 10*3/mm3      Eosinophils, Absolute 0.28 10*3/mm3      Basophils, Absolute 0.05 10*3/mm3      Immature Grans, Absolute 0.47 10*3/mm3      nRBC 0.3 /100 WBC     Blood Culture - Blood, Arm, Left [[id number]]  (Normal) Collected: 04/16/22 0112    Specimen: Blood from Arm, Left Updated: 04/19/22 0147     Blood Culture No growth at 3 days    Blood Culture With AASHISH - Blood, Arm, Right [[id number]]  (Normal) Collected: 04/14/22 1938    Specimen: Blood from Arm, Right Updated: 04/18/22 2018     Blood Culture No growth at 4 days    Blood Gas, Arterial - [[id number]]  (Abnormal) Collected: 04/18/22 1405    Specimen: Arterial Blood Updated: 04/18/22 1400     Site Left Radial      Urssell's Test Positive     pH, Arterial 7.518 pH units      Comment: 83 Value above reference range        pCO2, Arterial 32.0 mm Hg      Comment: 84 Value below reference range        pO2, Arterial 134.0 mm Hg      Comment: 83 Value above reference range        HCO3, Arterial 25.9 mmol/L      Base Excess, Arterial 3.0 mmol/L      Comment: 83 Value above reference range        O2 Saturation, Arterial 99.8 %      Comment: 83 Value above reference range        Temperature 37.0 C      Barometric Pressure for Blood Gas 756 mmHg      Modality Ventilator     FIO2 30 %      Ventilator Mode AC     Set Tidal Volume 450     Set Mech Resp Rate 16.0     PEEP 5.0     Collected by [id number]     Comment: Meter: [id number]     :  [id number]        pCO2, Temperature Corrected 32.0 mm Hg      pH, Temp Corrected 7.518 pH Units      pO2, Temperature Corrected 134 mm Hg     AFB Culture - Cerebrospinal Fluid, Lumbar Puncture [[id number]] Collected: 04/17/22 1239    Specimen: Cerebrospinal Fluid from Lumbar Puncture Updated: 04/18/22 1110     AFB Stain No acid fast bacilli seen on direct smear    Meningitis / Encephalitis Panel, PCR - Cerebrospinal Fluid, Lumbar Puncture [[id number]]  (Normal) Collected: 04/17/22 1239    Specimen: Cerebrospinal Fluid from Lumbar Puncture Updated: 04/18/22 1105     ESCHERICHIA COLI K1, PCR Not Detected     HAEMOPHILUS INFLUENZAE, PCR Not Detected     LISTERIA MONOCYTOGENES, PCR Not Detected     NEISSERIA MENINGITIDIS, PCR Not Detected     STREPTOCOCCUS AGALACTIAE, PCR Not Detected     STREPTOCOCCUS PNEUMONIAE, PCR Not Detected     CYTOMEGALOVIRUS (CMV), PCR Not Detected     ENTEROVIRUS, PCR Not Detected     HERPES SIMPLEX VIRUS 1 (HSV-1), PCR Not Detected     HERPES SIMPLEX VIRUS 2 (HSV-2), PCR Not Detected     HUMAN PARECHOVIRUS, PCR Not Detected     VARICELLA ZOSTER VIRUS (VZV), PCR Not Detected     CRYPTOCOCCUS NEOFORMANS / GATTII, PCR Not Detected     HUMAN HERPES VIRUS 6 PCR Not Detected     Narrative:      This test is performed by utilizing real time polymerace chain recation (PCR).   The [employer] ME Panel does not distinguish between latent and active CMV and HHV-6 infections. Detection of these viruses may indicate primary infection, secondary reactivation, or the presence of latent virus. Results should always be interpreted in conjunction with other clinical, laboratory, and epidemiological information.    Paraneoplastic Profile 1, CSF - Cerebrospinal Fluid, Lumbar Puncture [[id number]] Collected: 04/17/22 1239    Specimen: Cerebrospinal Fluid from Lumbar Puncture Updated: 04/18/22 1021        Imaging Results (Last 24 Hours)     Procedure Component Value Units Date/Time    XR Chest 1 View [[id number]] Collected: 04/19/22 0839     Updated: 04/19/22 0845    Narrative:      EXAMINATION: XR CHEST 1 VW- 4/19/2022 8:39 AM CDT     HISTORY: vent check; E87.6-Hypokalemia; N39.0-Urinary tract infection,  site not specified; R31.9-Hematuria, unspecified; R11.2-Nausea with  vomiting, unspecified; E87.2-Acidosis; R13.10-Dysphagia, unspecified;  A41.9-Sepsis, unspecified organism; N39.0-Urinary tract infection, site  not specified.     REPORT: A frontal view of the chest was obtained.     COMPARISON: Chest x-ray 4/18/2022 1300 hours.     Endotracheal tube projects over the midline trachea with tip  approximately 15 mm superior to the jaime, satisfactory and stable. The  tip of the NG tube projects over the gastric fundus as before. There is  partial visualization of the internal and external left nephrostomy  tube, oriented vertically over the left lower chest and upper abdomen.  There is mild atelectasis in the lung bases greater on the left. No lung  consolidation is identified. There is no pneumothorax or pleural  effusion. No acute osseous findings. Mild levoscoliosis of the thoracic  spine.       Impression:      Stable 1 day appearance of the chest.  This report was finalized on 04/19/2022 08:42 by   Florian Laurent MD.    MRI Brain With & Without Contrast [[id number]] Collected: 04/18/22 1738     Updated: 04/18/22 1752    Narrative:      HISTORY: Unresponsive, improving kidney function     MRI BRAIN: Multiplanar imaging the brain performed pre- and post-IV  contrast.     COMPARISON: Nonenhanced MRI brain 4/16/2022     FINDINGS: There is hyperintense T2 FLAIR signal symmetrically along the  bilateral frontal parietal and occipital lobes. Increased signal on  diffusion-weighted images most likely T2 shine through artifact there is  no abnormal enhancement identified. Minimal hyperintense FLAIR signal  within the periventricular regions with few hyperintense punctate FLAIR  signal within the subcortical frontal lobes. No abnormal extra-axial  fluid collection.     Limited assessment of the orbits and base of skull unremarkable.       Impression:      1. No abnormal enhancement identified within the region of hyperintense  T2/FLAIR signal involving the bilateral frontal,parietal and occipital  cortical regions. 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 No abnormal enhancement identified.  This report was finalized on 04/18/2022 17:49 by Dr. Rina Ricardo MD.    XR Chest 1 View [[id number]] Collected: 04/18/22 1403     Updated: 04/18/22 1410    Narrative:      EXAMINATION: XR CHEST 1 VW- 4/18/2022 2:03 PM CDT     HISTORY: Intubation; E87.6-Hypokalemia; N39.0-Urinary tract infection,  site not specified; R31.9-Hematuria, unspecified; R11.2-Nausea with  vomiting, unspecified; E87.2-Acidosis; R13.10-Dysphagia, unspecified;  A41.9-Sepsis, unspecified organism; N39.0-Urinary tract infection, site  not specified.     REPORT: A frontal view the chest was obtained.     COMPARISON: Chest x-ray 2/20/2022.     The patient is now intubated, with endotracheal tube in satisfactory  position with tip approximately 1.4 cm superior to the jaime. An NG  tube has been inserted, tip is  at the gastric fundus level. There is  partial visualization of the left-sided external/internal percutaneous  nephrostomy nephrostomy catheter, the tip is shown to be over the  midline exterior to the patient on previous CT of the abdomen on  4/15/2022. There is mild atelectasis in the left lung base. No lung  consolidation is identified. Heart size is normal. No pneumothorax or  pleural effusion is identified.       Impression:      Satisfactory position of the endotracheal tube and NG tube.  Mild volume loss in the lung bases, greater on the left. No lung  consolidation is identified. There is no pneumothorax or pleural  effusion.        This report was finalized on 04/18/2022 14:07 by Dr. Florian Laurent MD.          Assessment/Plan     Hospital Problem List      Intractable nausea and vomiting    Sepsis secondary to UTI (HCC)    Lactic acidosis    Hypokalemia    Essential (primary) hypertension    UTI (urinary tract infection), bacterial    Malfunction of nephrostomy tube (HCC)    Severe malnutrition (CMS/HCC)    Hypophosphatemia    Impression:  1. Unresponsive   2. Abnormal MRI brain with differential as given before   Appears to have metabolic component and we've raisedissue of PRES  But not typical and while PRES can be seen for other reasons she never had hypertension except  Very transiently. She has been on levophed for blood pressures which for a time has been in the 70's and then 80's  And levophed adjusted accordingly  Off levophed now.   Plan:  Discussed case with Claudia Beach MD who is a Neurologist at .   She very kindly  will have their radiologist look at films  Initial one stated this ws hypoxia but clinically this was never seen here   She had been on telemetry and pulse ox and no record of desaturation.  [facility] has [facility] admission  [place] turned down x 2 when approached for medical reasons due to no bed available   has her on a  waiting list        Abiola Titus MD  04/19/22  08:56 CDT

## 2022-04-19 NOTE — PROGRESS NOTES
Patient still intubated and unresponsive.  She is passing gas and stool through the fecal management system.  NG tube still in place.  Abdomen is softer nondistended.  Her left leg has no evidence of infection it feels softer there is some dependent edema but I continue to feel that this does not represent an abscess but more likely some spontaneous bleeding in that leg that may be resolving.  Patient apparently is on the wait list for transfer to .  Will order abdominal x-ray for morning rounds if she is still here to check her ileus

## 2022-04-19 NOTE — PROGRESS NOTES
INFECTIOUS DISEASES PROGRESS NOTE    Patient:  Namita Zabala  YOB: 1975  MRN: [id number]   Admit date: 4/2/2022   Admitting Physician: Brody Potter MD  Primary Care Physician: David Lara MD    Chief Complaint: Unobtainable from patient as she is now intubated      Interval History: Patient is now intubated.  Patient needed MRI yesterday.  CARLEY Gonzalez reports that when he would lie the patient flat, she did not  have adequate control of her airway.  The patient was intubated per Dr. Potter to protect her airway for MRI.    Report when propofol paused, as well as in all 4 extremities.      norepi paused since about 5 pm yesterday      Allergies:   Allergies   Allergen Reactions   • Coconut Unknown - High Severity   • Nuts Unknown - High Severity   • Penicillins    • [place] Other (See Comments)     Causes migraines per pt reports       Current Scheduled Medications:   ertapenem, 1 g, Intravenous, Q24H  famotidine, 20 mg, Intravenous, Q12H  FLUoxetine, 20 mg, Oral, Nightly  fluticasone, 2 spray, Each Nare, BID  folic acid, 1 mg, Oral, Daily  levETIRAcetam, 500 mg, Intravenous, Q12H  neomycin-polymyxin-hydrocortisone, 4 drop, Both Ears, Q6H  sodium bicarbonate, 650 mg, Oral, BID  sodium chloride, 10 mL, Intravenous, Q12H      Current PRN Medications:  hold  •  hydrALAZINE  •  labetalol  •  Morphine  •  ondansetron **OR** ondansetron  •  potassium chloride **OR** potassium chloride **OR** potassium chloride  •  potassium phosphate infusion greater than 15 mMoles **OR** potassium phosphate infusion greater than 15 mMoles **OR** potassium phosphate **OR** sodium phosphate IVPB **OR** sodium phosphate IVPB **OR** sodium phosphate IVPB  •  sodium chloride  •  [COMPLETED] Insert peripheral IV **AND** sodium chloride  •  [COMPLETED] Insert peripheral IV **AND** sodium chloride  •  sodium chloride    Review of Systems   Unable to perform ROS: Intubated       Objective     Vital  "Signs:  Temp (24hrs), Av.2 °F (36.2 °C), Min:95.3 °F (35.2 °C), Max:98.7 °F (37.1 °C)      BP 93/55   Pulse 90   Temp 98.2 °F (36.8 °C) (Axillary)   Resp 15   Ht 170.2 cm (67\")   Wt 72.5 kg (159 lb 13.3 oz)   SpO2 100%   BMI 25.03 kg/m²         Physical Exam   General: Patient is acutely ill-appearing lying in bed in the CCU.  Stephanie hugger in place but on monitor only.  HEENT: ET tube is in place.  OG tube is in place.  Neck: Supple without any meningismus  Respiratory: Effort even and unlabored.  FiO2 30% PEEP of 5 O2 sats 100%  Abdomen: Soft, no tenderness elicited.  Mepilex in place over area of midline abdominal wound.    Neuro: Sedated on propofol  Psych: Sedated on propofol      Line/IV site: CC femoral right, condition patent and no redness      Results Review:    I reviewed the patient's new clinical results.    Lab Results:  CBC:   Lab Results   Lab 22  1116 04/15/22  1150 22  0200 22  1511 22  0732 22  1417 22  0231 22  0329   WBC 10.54 15.69* 15.70*  --  12.14* 12.54* 11.08* 10.38   HEMOGLOBIN 9.9* 7.2* 4.8* 9.0* 6.6* 8.8* 7.9* 8.8*   HEMATOCRIT 29.6* 21.4* 15.8* 26.3* 19.4* 25.1* 23.5* 25.9*   PLATELETS 354 347 301  --  161 162 185 214   LYMPHOCYTE %  --   --   --   --   --   --  4.0*  --    MONOCYTES %  --   --   --   --   --   --  5.1  --      >>>>> 69.8% neutrophils/13.9% lymph    CMP:   Lab Results   Lab 22  0357 22  0231 22  0329   SODIUM 140 140 142   POTASSIUM 3.8 3.4* 3.6   CHLORIDE 104 106 108*   CO2 25.0 25.0 24.0   BUN 11 7 6   CREATININE 1.33* 0.92 0.91   CALCIUM 7.5* 7.9* 8.7   BILIRUBIN 0.4 0.5 0.6   ALK PHOS 63 74 88   ALT (SGPT) 15 17 21   AST (SGOT) 28 45* 43*   GLUCOSE 126* 77 91       Estimated Creatinine Clearance: 90.3 mL/min (by C-G formula based on SCr of 0.91 mg/dL).    Culture Results:    Microbiology Results (last 10 days)     Procedure Component Value - Date/Time    Culture, CSF - Cerebrospinal Fluid, Lumbar " Puncture [[id number]] Collected: 04/17/22 1239    Lab Status: Preliminary result Specimen: Cerebrospinal Fluid from Lumbar Puncture Updated: 04/18/22 0631     CSF Culture No growth     Gram Stain No WBCs or organisms seen    AFB Culture - Cerebrospinal Fluid, Lumbar Puncture [[id number]] Collected: 04/17/22 1239    Lab Status: Preliminary result Specimen: Cerebrospinal Fluid from Lumbar Puncture Updated: 04/18/22 1110     AFB Stain No acid fast bacilli seen on direct smear    Cryptococcal AG, CSF - Cerebrospinal Fluid, Lumbar Puncture [[id number]]  (Normal) Collected: 04/17/22 1239    Lab Status: Final result Specimen: Cerebrospinal Fluid from Lumbar Puncture Updated: 04/17/22 1403     Cryptococcal Antigen, CSF Negative    Meningitis / Encephalitis Panel, PCR - Cerebrospinal Fluid, Lumbar Puncture [[id number]]  (Normal) Collected: 04/17/22 1239    Lab Status: Final result Specimen: Cerebrospinal Fluid from Lumbar Puncture Updated: 04/18/22 1105     ESCHERICHIA COLI K1, PCR Not Detected     HAEMOPHILUS INFLUENZAE, PCR Not Detected     LISTERIA MONOCYTOGENES, PCR Not Detected     NEISSERIA MENINGITIDIS, PCR Not Detected     STREPTOCOCCUS AGALACTIAE, PCR Not Detected     STREPTOCOCCUS PNEUMONIAE, PCR Not Detected     CYTOMEGALOVIRUS (CMV), PCR Not Detected     ENTEROVIRUS, PCR Not Detected     HERPES SIMPLEX VIRUS 1 (HSV-1), PCR Not Detected     HERPES SIMPLEX VIRUS 2 (HSV-2), PCR Not Detected     HUMAN PARECHOVIRUS, PCR Not Detected     VARICELLA ZOSTER VIRUS (VZV), PCR Not Detected     CRYPTOCOCCUS NEOFORMANS / GATTII, PCR Not Detected     HUMAN HERPES VIRUS 6 PCR Not Detected    Narrative:      This test is performed by utilizing real time polymerace chain recation (PCR).   The [employer] ME Panel does not distinguish between latent and active CMV and HHV-6 infections. Detection of these viruses may indicate primary infection, secondary reactivation, or the presence of latent virus. Results should always be interpreted  in conjunction with other clinical, laboratory, and epidemiological information.    Blood Culture - Blood, Arm, Left [[id number]]  (Normal) Collected: 04/16/22 0346    Lab Status: Preliminary result Specimen: Blood from Arm, Left Updated: 04/19/22 0416     Blood Culture No growth at 3 days    Blood Culture - Blood, Arm, Left [[id number]]  (Normal) Collected: 04/16/22 0112    Lab Status: Preliminary result Specimen: Blood from Arm, Left Updated: 04/19/22 0147     Blood Culture No growth at 3 days    Blood Culture With AASHISH - Blood, Arm, Right [[id number]]  (Normal) Collected: 04/14/22 1938    Lab Status: Preliminary result Specimen: Blood from Arm, Right Updated: 04/18/22 2018     Blood Culture No growth at 4 days    Urine Culture - Urine, Urine, Catheter In/Out [[id number]]  (Abnormal)  (Susceptibility) Collected: 04/14/22 1025    Lab Status: Final result Specimen: Urine, Catheter In/Out Updated: 04/17/22 1129     Urine Culture >100,000 CFU/mL Klebsiella oxytoca ESBL     Comment: Consider infectious disease consult.  Susceptibility results may not correlate to clinical outcomes.       Susceptibility      Klebsiella oxytoca ESBL      SANDEEP      Amikacin Susceptible      Ertapenem Susceptible      Gentamicin Resistant     Levofloxacin Intermediate      Meropenem Susceptible      Nitrofurantoin Susceptible      Piperacillin + Tazobactam Susceptible      Tobramycin Intermediate      Trimethoprim + Sulfamethoxazole Susceptible                                      Radiology:   Imaging Results (Last 72 Hours)     Procedure Component Value Units Date/Time    MRI Brain With & Without Contrast [[id number]] Collected: 04/18/22 1738     Updated: 04/18/22 1752    Narrative:      HISTORY: Unresponsive, improving kidney function     MRI BRAIN: Multiplanar imaging the brain performed pre- and post-IV  contrast.     COMPARISON: Nonenhanced MRI brain 4/16/2022     FINDINGS: There is hyperintense T2 FLAIR signal symmetrically along  the  bilateral frontal parietal and occipital lobes. Increased signal on  diffusion-weighted images most likely T2 shine through artifact there is  no abnormal enhancement identified. Minimal hyperintense FLAIR signal  within the periventricular regions with few hyperintense punctate FLAIR  signal within the subcortical frontal lobes. No abnormal extra-axial  fluid collection.     Limited assessment of the orbits and base of skull unremarkable.       Impression:      1. No abnormal enhancement identified within the region of hyperintense  T2/FLAIR signal involving the bilateral frontal,parietal and occipital  cortical regions. 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 No abnormal enhancement identified.  This report was finalized on 04/18/2022 17:49 by Dr. Rina Ricardo MD.    XR Chest 1 View [[id number]] Collected: 04/18/22 1403     Updated: 04/18/22 1410    Narrative:      EXAMINATION: XR CHEST 1 VW- 4/18/2022 2:03 PM CDT     HISTORY: Intubation; E87.6-Hypokalemia; N39.0-Urinary tract infection,  site not specified; R31.9-Hematuria, unspecified; R11.2-Nausea with  vomiting, unspecified; E87.2-Acidosis; R13.10-Dysphagia, unspecified;  A41.9-Sepsis, unspecified organism; N39.0-Urinary tract infection, site  not specified.     REPORT: A frontal view the chest was obtained.     COMPARISON: Chest x-ray 2/20/2022.     The patient is now intubated, with endotracheal tube in satisfactory  position with tip approximately 1.4 cm superior to the jaime. An NG  tube has been inserted, tip is at the gastric fundus level. There is  partial visualization of the left-sided external/internal percutaneous  nephrostomy nephrostomy catheter, the tip is shown to be over the  midline exterior to the patient on previous CT of the abdomen on  4/15/2022. There is mild atelectasis in the left lung base. No lung  consolidation is identified. Heart size is normal. No pneumothorax or  pleural  effusion is identified.       Impression:      Satisfactory position of the endotracheal tube and NG tube.  Mild volume loss in the lung bases, greater on the left. No lung  consolidation is identified. There is no pneumothorax or pleural  effusion.        This report was finalized on 04/18/2022 14:07 by Dr. Florian Laurent MD.    IR LUMBAR PUNCTURE DIAGNOSTIC [[id number]] Collected: 04/17/22 1300     Updated: 04/17/22 1304    Narrative:      EXAM: IR LUMBAR PUNCTURE DIAGNOSTIC- - 4/17/2022 12:06 PM CDT     HISTORY: unresponsive and abnormal mri; E87.6-Hypokalemia; N39.0-Urinary  tract infection, site not specified; R31.9-Hematuria, unspecified;  R11.2-Nausea with vomiting, unspecified; E87.2-Acidosis;  R13.10-Dysphagia, unspecified; A41.9-Sepsis, unspecified organism;  N39.0-Urinary tract infection, site not specified       COMPARISON: None.      TECHNIQUE: Fluoroscopy-guided lumbar puncture for CSF.  Number of images: 2  Fluoroscopy time: 20 seconds  Dose: 13.8 mGy     PROCEDURE:   After discussing risks, benefits and alternatives of the procedure with  the patient's family, written consent was obtained.      A timeout was performed including the patient's name, date of birth and  procedure to be performed.     The patient was prepped and draped in sterile fashion. 1 percent  lidocaine was administered for local anesthesia.     Under fluoroscopic guidance, a 20-gauge needle was advanced into the  thecal sac at the L3-L4 level. 10 mL of clear fluid was removed. The  patient tolerated the procedure well, and there were no immediate  complications.      The CSF was sent for analysis per referring clinician's orders.     Opening pressure: 13.8 cm of water       Impression:      Successful fluoroscopic-guided lumbar puncture as described. 10 mL clear  CSF sent to the laboratory..  This report was finalized on 04/17/2022 13:00 by Dr. Gomez Cárdenas MD.    MRI Brain Without Contrast [[id number]] Collected: 04/16/22 7814      Updated: 04/16/22 1850    Narrative:      EXAMINATION:   MRI BRAIN WO CONTRAST-  4/16/2022 6:42 PM CDT     HISTORY: MRI brain without contrast 4/16/2022 6:00 PM CDT     History: Unresponsive staring acute neuro change; E87.6-Hypokalemia;  N39.0-Urinary tract infection, site not specified; R31.9-Hematuria,  unspecified; R11.2-Nausea with vomiting, unspecified; E87.2-Acidosis;  R13.10-Dysphagia, unspecified; A41.9-Sepsis, unspecified organism;  N39.0-Urinary tract infection, site not specified     Comparison: None.       Technique: Multiplanar imaging of the brain was performed in a routine  fashion.      Findings:   Midline structures are nondisplaced. Ventricular system is normal. There  is no significant mass effect or hydrocephalus. Basilar cisterns are  preserved. Abnormal restricted diffusion is noted bilaterally in the  occipital lobes.. No abnormal extra axial fluid collections are noted.  Restricted diffusion is noted which is subcortical in the bilateral  parietal cortex. This may be an encephalitis. The FLAIR sequence is not  as intense as the restricted diffusion.     Mild cortical volume loss is noted..      Proximal cervical spinal cord, brainstem, and cerebellum are  unremarkable. Normal cerebrovascular flow voids are seen. Bilateral  globes and orbits are normal in appearance.     No abnormal signal is noted in the mastoid air cells or paranasal  sinuses.        Impression:      Impression:   Abnormal signal with restricted diffusion in the bilateral occipital and  also subcortical bilateral parietal cortex. Most likely this is an  encephalitis.         This report was finalized on 04/16/2022 18:47 by Dr. Nadeem Marshall MD.    CT Head Without Contrast [[id number]] Collected: 04/16/22 1150     Updated: 04/16/22 1156    Narrative:      EXAM/TECHNIQUE: CT HEAD WO CONTRAST-     INDICATION: Mental status change, generalized weakness, dizziness     COMPARISON: 04/11/2022     DLP: 643 mGy cm. Automated  exposure control was also utilized to  decrease patient radiation dose.     FINDINGS:     No evidence of acute intracranial hemorrhage. No loss of gray-white  differentiation. No midline shift or mass effect. Lateral ventricles are  nondilated. Basilar cisterns are patent. No acute orbital finding.  Mastoid air cells are clear. Visualized paranasal sinuses are clear.  Partially imaged nasogastric tube. No acute osseous finding.       Impression:         No acute intracranial findings.  This report was finalized on 04/16/2022 11:53 by Dr. Gomez Cárdenas MD.    XR Abdomen KUB [[id number]] Collected: 04/16/22 1100     Updated: 04/16/22 1105    Narrative:      EXAM: XR ABDOMEN KUB-     INDICATION: NG placement; E87.6-Hypokalemia; N39.0-Urinary tract  infection, site not specified; R31.9-Hematuria, unspecified;  R11.2-Nausea with vomiting, unspecified; E87.2-Acidosis;  R13.10-Dysphagia, unspecified; A41.9-Sepsis, unspecified organism;  N39.0-Urinary tract infection, site not specified     COMPARISON: None available.     FINDINGS:     Nasogastric tube tip projects over the expected location of the gastric  fundus. A left-sided nephrostomy tube/ureteral stent is in place. There  is marked gaseous distention of the stomach, small bowel, and colon.  There is a transverse colon measures up to 8 mm in diameter. Small bowel  loops measure up to 3.37 m in diameter. A RIGHT femoral venous line is  noted.       Impression:         1.  Enteric tube projects over the proximal stomach.  2.  Marked distention of the stomach, small bowel, and colon. Favor  ileus.  This report was finalized on 04/16/2022 11:02 by Dr. Gomez Cárdenas MD.          Assessment/Plan     Active Hospital Problems    Diagnosis    • **Intractable nausea and vomiting    • Severe malnutrition (CMS/HCC)    • Hypophosphatemia    • Hypokalemia    • UTI (urinary tract infection), bacterial    • Malfunction of nephrostomy tube ([facility])    • Lactic acidosis    • Sepsis  "secondary to UTI (HCC)    • Essential (primary) hypertension        IMPRESSION:    1. Acute respiratory on the vent-understanding and she was placed in the vent for airway protection in order to get an MRI.  She is on minimal ventilator settings  2. History of necrotizing urinary tract infection previous admission now with urinary tract infection with ESBL Klebsiella -in and out cath collection per computer.  On ertapenem  3. Leukocytosis- resolved  4. Acute blood loss anemia thought secondary to left thigh hematoma-Per report of CT \"suspicious for abscess in the left abductor katie muscle\" stable/improving  5. No MRI of the brain radiologist interpretation has been concern for posterior reversible syndrome  6. Blood culture with coagulase-negative staph-contaminant    RECOMMENDATION:   Plan on 7-day course of IV antibiotics for ESBL E. coli urinary tract infection and discontinue  Ventilator management per pulmonary  Hemodynamic management per hospitalist  Monitor CBC closely especially given recent blood loss.  PRES? Management per neurology      Discussed with CARLEY Gonzalez MD  04/19/22  08:09 CDT      "

## 2022-04-19 NOTE — PROGRESS NOTES
"Palliative Care Daily Progress Note   Chief complaint: goals of care/advanced care planning  Code Status and Medical Interventions:   Ordered at: 04/03/22 0411     Level Of Support Discussed With:    Patient     Code Status (Patient has no pulse and is not breathing):    CPR (Attempt to Resuscitate)     Medical Interventions (Patient has pulse or is breathing):    Full Support     Subjective   Medical record reviewed. Events noted. She was intubated yesterday afternoon in order for MRI to be completed as she was unable to lie flat and clear her secretions. MRI revealed hyperintense FLAIR signal involving the bilateral frontal, parietal and occipital  cortical regions most favorable for posterior reversible encephalopathy syndrome. Venous doppler US completed of BLE today although results pending. CSF cytology has resulted and is negative for malignant cells and found to be virtually acellular fluid. Final cultures of CSF fluid pending. Appears she was restarted on levophed briefly yesterday around 1 PM but stopped around 5 PM. Remains intubated and sedated with propofol. Does not appear in any distress, no visitors at bedside. Awaiting possible discharge to tertiary care center.    Advance Care Planning   Advance Care Planning Discussion: Spoke to patient's spouse, Grant, yesterday. Attempted to call today however no answer and voicemail is now full again. Call placed to patient's mother, Marquita, to determine if she or patient's spouse had any questions and explored goals of care. Marquita expressed how hard it has been since her daughter has been sick since last fall but especially this hospitalization as she is unsure of what has caused this decline. She also reflected how \"crazy\" this hospitalization has been as she was talking at the beginning and now unable to speak. Marquita stated, \"We're praying for her. She's our only child, I'm keeping my kelsey, but it's really hard.\" She asked if there were any new results since " "she and her  visited last night and since she has spoken with patient's spouse, Grant, explained final results of several tests were pending. Marquita stated, \"I know it sounds bad and I don't want them to find something but at least if they do find something maybe you can know the cause and it can be cured.\" Reports she is hopeful she will be accepted to transfer to [place] as this is where she had been previously and that [place] is so far away and expressed worries of her or her family being unable to visit with her. She stated, \"We want her to be healed but knowing she is all by herself is heartbreaking as well.\" Explained providers were still working on transfer. Marquita asked if there was a \"method to grading how sick she is is or see how bad it is\" referring to her daughter as she reflected on other diseases that are staged. She states she is at the last stage of fibromyalgia. Shared that her daughter was critically ill at this time however there is difficulty in pinpointing an exact cause to illness. Marquita expressed appreciation to care being provided to her daughter and stated, \"We are praying the Lord is going to heal her. I can't accept anything else right now.\" Spouse, Grant, returned phone call after speaking to patient's mother. He shared he was able to visit yesterday morning before work. Reports he has continuously been praying that she will improve and someday get back home. Reflected on how hard the last several months have been and stated, \"I am just lost for words.\" Support provided. Grant stated, \"She fought through this once and got through it, I know this time is different but I am hopeful she can. I just worry about any brain damage she might have and how it might effect her.\" He remains hopeful and looks forward for any updates.    Advance Directive Status: Patient does not have advance directive   Goals of care: Ongoing.    The patient receives support from her spouse and " parent.  POA/Healthcare Surrogate - Next of kin is her spouse, Grant.    Review of Systems   Unable to perform ROS: intubated     Pain Assessment  Nonverbal Indicators of Pain: grimace  CPOT and PAINAD Scales: PAINAD (Pain Assessment in Advance Dementia Scale)  CPOT Facial Expression: 0-->relaxed, neutral  CPOT Body Movements: 0-->absence of movements  CPOT Muscle Tension: 0-->relaxed  Ventilator Compliance/Vocalization: 0-->tolerating ventilator or movement  CPOT Score: 0  PAINAD Breathin-->normal  PAINAD Negative Vocalization: 0-->none  PAINAD Facial Expression: 0-->smiling or inexpressive  PAINAD Body Language: 0-->relaxed  PAINAD Consolability: 0-->no need to console  PAINAD Score: 0  Pain Location: abdomen    Objective   Diagnostics: Reviewed      Intake/Output Summary (Last 24 hours) at 2022 1340  Last data filed at 2022 1212  Gross per 24 hour   Intake 2307.57 ml   Output 1550 ml   Net 757.57 ml     Current Facility-Administered Medications   Medication Dose Route Frequency Provider Last Rate Last Admin   • ertapenem (INVanz) 1 g in sodium chloride 0.9 % 100 mL IVPB-VTB  1 g Intravenous Q24H Amado Villarreal  mL/hr at 22 1714 1 g at 22 1714   • famotidine (PEPCID) injection 20 mg  20 mg Intravenous Q12H Amado Villarreal MD   20 mg at 22 0936   • FLUoxetine (PROzac) capsule 20 mg  20 mg Oral Nightly Amado Villarreal MD   20 mg at 22   • fluticasone (FLONASE) 50 MCG/ACT nasal spray 2 spray  2 spray Each Nare BID Frank Ricks MD   2 spray at 22 0855   • folic acid (FOLVITE) tablet 1 mg  1 mg Oral Daily Amado Villarreal MD   1 mg at 22   • Hold medication  1 each Does not apply Continuous PRN Abiola Yan MD       • hydrALAZINE (APRESOLINE) injection 10 mg  10 mg Intravenous Q4H PRN Amado Villarreal MD       • labetalol (NORMODYNE,TRANDATE) injection 10 mg  10 mg Intravenous Q4H PRN Amado Villarreal MD   10 mg at 04/10/22 1518   •  lactated ringers infusion  75 mL/hr Intravenous Continuous Amado Villarreal MD 75 mL/hr at 04/19/22 0445 75 mL/hr at 04/19/22 0445   • levETIRAcetam in NaCl 0.82% (KEPPRA) IVPB 500 mg  500 mg Intravenous Q12H Abiola Yan MD   500 mg at 04/19/22 0855   • Morphine sulfate (PF) injection 2 mg  2 mg Intravenous Q4H PRN Amado Villarreal MD       • neomycin-polymyxin-hydrocortisone (CORTISPORIN) otic suspension 4 drop  4 drop Both Ears Q6H Fermin Mcclure DO   4 drop at 04/19/22 1206   • norepinephrine (LEVOPHED) 8 mg in 250 mL NS infusion (premix)  0.02-0.3 mcg/kg/min Intravenous Titrated Brody Potter MD   Stopped at 04/18/22 1715   • ondansetron (ZOFRAN) tablet 4 mg  4 mg Oral Q6H PRN Frank Ricks MD        Or   • ondansetron (ZOFRAN) injection 4 mg  4 mg Intravenous Q6H PRN Frank Ricks MD   4 mg at 04/16/22 1000   • potassium chloride (MICRO-K) CR capsule 40 mEq  40 mEq Oral PRN Amado Villarreal MD        Or   • potassium chloride (KLOR-CON) packet 40 mEq  40 mEq Oral PRN Amado Villarreal MD        Or   • potassium chloride 10 mEq in 100 mL IVPB  10 mEq Intravenous Q1H PRN Amado Villarreal  mL/hr at 04/19/22 1046 10 mEq at 04/19/22 1046   • potassium phosphate 45 mmol in sodium chloride 0.9 % 500 mL infusion  45 mmol Intravenous PRN Amado Villarreal MD        Or   • potassium phosphate 30 mmol in sodium chloride 0.9 % 250 mL infusion  30 mmol Intravenous PRN Amado Villarreal MD 31.3 mL/hr at 04/16/22 1900 30 mmol at 04/16/22 1900    Or   • Potassium Phosphates 15 mmol in sodium chloride 0.9 % 100 mL infusion  15 mmol Intravenous PRN Amado Villarreal MD        Or   • sodium phosphates 45 mmol in sodium chloride 0.9 % 500 mL IVPB  45 mmol Intravenous PRN Amado Villarreal MD        Or   • sodium phosphates 30 mmol in sodium chloride 0.9 % 250 mL IVPB  30 mmol Intravenous PRN Amado Villarreal MD        Or   • sodium phosphates 15 mmol in sodium chloride 0.9 % 250 mL IVPB  15 mmol Intravenous  "PRN Amado Villarreal MD       • propofol (DIPRIVAN) infusion 10 mg/mL 100 mL  5-50 mcg/kg/min Intravenous Titrated Brody Potter MD 8.65 mL/hr at 04/19/22 1045 20 mcg/kg/min at 04/19/22 1045   • sodium bicarbonate tablet 650 mg  650 mg Oral BID Amado Villarreal MD   650 mg at 04/19/22 0855   • sodium chloride 0.9 % flush 10 mL  10 mL Intravenous PRN Frank Ricks MD       • sodium chloride 0.9 % flush 10 mL  10 mL Intravenous PRN Frank Ricks MD       • sodium chloride 0.9 % flush 10 mL  10 mL Intravenous PRN Frank Ricks MD       • sodium chloride 0.9 % flush 10 mL  10 mL Intravenous Q12H Frank Ricks MD   10 mL at 04/19/22 0856   • sodium chloride 0.9 % flush 10 mL  10 mL Intravenous PRN Frank Ricks MD   10 mL at 04/10/22 1518     hold, 1 each  lactated ringers, 75 mL/hr, Last Rate: 75 mL/hr (04/19/22 0445)  norepinephrine, 0.02-0.3 mcg/kg/min, Last Rate: Stopped (04/18/22 1715)  propofol, 5-50 mcg/kg/min, Last Rate: 20 mcg/kg/min (04/19/22 1045)      hold  •  hydrALAZINE  •  labetalol  •  Morphine  •  ondansetron **OR** ondansetron  •  potassium chloride **OR** potassium chloride **OR** potassium chloride  •  potassium phosphate infusion greater than 15 mMoles **OR** potassium phosphate infusion greater than 15 mMoles **OR** potassium phosphate **OR** sodium phosphate IVPB **OR** sodium phosphate IVPB **OR** sodium phosphate IVPB  •  sodium chloride  •  [COMPLETED] Insert peripheral IV **AND** sodium chloride  •  [COMPLETED] Insert peripheral IV **AND** sodium chloride  •  sodium chloride    Assessment:  Vital Signs: /82   Pulse 96   Temp 97.5 °F (36.4 °C) (Axillary)   Resp 18   Ht 170.2 cm (67\")   Wt 72.5 kg (159 lb 13.3 oz)   SpO2 100%   BMI 25.03 kg/m²     Physical Exam  Vitals and nursing note reviewed.   Constitutional:       General: She is not in acute distress.     Appearance: She is ill-appearing.      Interventions: She is sedated, intubated and " restrained.   HENT:      Head: Normocephalic and atraumatic.      Nose:      Comments: NG right nostril     Mouth/Throat:      Comments: ETT  Eyes:      General: Lids are normal.   Neck:      Vascular: No JVD.   Cardiovascular:      Rate and Rhythm: Tachycardia present.      Heart sounds: Normal heart sounds.   Pulmonary:      Effort: She is intubated.      Breath sounds: Decreased breath sounds present.   Abdominal:      General: Bowel sounds are decreased. There is no distension.      Palpations: Abdomen is soft.      Comments: Fecal management tube   Genitourinary:     Comments: Mojica catheter and left sided nephrostomy tube.  Musculoskeletal:      Right hand: Swelling present.      Cervical back: Neck supple.      Right foot: Foot drop present.      Left foot: Foot drop present.   Skin:     General: Skin is warm and dry.      Coloration: Skin is pale.      Findings: Signs of injury (documented pressure injury) present.   Neurological:      Motor: Weakness and abnormal muscle tone present.      Comments: CARLI, intubated and sedated.   Psychiatric:      Comments: CARLI, intubated and sedated.     Patient status: Disease state: Deteriorating despite treatments.  Functional status: Palliative Performance Scale Score: Performance 10% based on the following measures: Ambulation: Totally bed bound, Activity and Evidence of Disease: Unable to do any work, extensive evidence of disease, Self-Care: Total care required,  Intake: Mouth care only, LOC: Drowsy or comatose. Nutritional status: Albumin 2.20.Body mass index is 25.03 kg/m².     Impression/Problem List:  1.    Altered mental status / concerns for PRES per MRI of brain  2.    Impaired mobility and ability to perform activities of daily living  3.    Severe malnutrition  4.    Urinary tract infection  5.    Sepsis secondary to UTI  6.    Hypotension   7.    Abnormal EEG  8.    Pulmonary embolism  9.    Anemia  10.  Ileus  11.  Hypocalcemia  12.  Hepatic steatosis  13.   "Malfunctioning nephrostomy tube  14.  Folate deficiency  15.  Anxiety  16.  Lactic acidosis  17.  Dysphagia    18.  Intractable nausea and vomiting, improved  19.  History of COVID-19 (August 2021)  20.  Presence of NG  21.  Elevated AST  22.  Impaired functioning of gallbladder (sludge-filled and 20% EF per imaging)    Plans/Recommendations     1. Goals of care include CODE STATUS CPR with full support interventions.    2. Palliative care encounter  - Prognosis is guarded secondary to altered mental status with abnormal MRI and EEG, impaired mobility and ability to perform activities of daily living, severe malnutrition, urinary tract infection, sepsis, hypotension, malfunctioning nephrostomy tube, anemia, ileus, dysphagia, hypokalemia, elevated AST, hepatic steatosis, suspicion for abscess of left abductor major and other comorbidities listed above.  - Spoke to patient's mother, Marquita, who shared how hard it has been watching her daughter be ill. Reports, \"We are praying the Lord is going to heal her. I can't accept anything else right now.\"  - Discussed with patient's spouse, Grant, and he also reflected on how hard it has been watching his wife go through illness. Grant stated, \"She fought through this once and got through it, I know this time is different but I am hopeful she can. I just worry about any brain damage she might have and how it might effect her.\"  - Ongoing workup. Continued efforts by attending and neurology for transfer to tertiary care center.   - Will plan to follow up tomorrow or sooner if needed.      Thank you for allowing us to participate in patient's plan of care. Palliative Care Team will continue to follow patient.     Time spent: 30 minutes spent reviewing medical and medication records, assessing and examining patient, discussing with family, answering questions, providing some guidance about a plan and documentation of care, and coordinating care with other healthcare members, with > " 50% time spent face to face.       Nneka Barton, APRN  4/19/2022

## 2022-04-19 NOTE — PAYOR COMM NOTE
"4/19/22 [facility]    [phone]    4/19/22 DAILY CLINICAL FOR CONT STAY.                Namita Cooper (46 y.o. Female)             Date of Birth   1975    Social Security Number       Address   [address] [place], [place] [address]    Home Phone   [phone]    MRN   [id number]       Rastafarian   [facility]    Marital Status                               Admission Date   4/2/22    Admission Type   Emergency    Admitting Provider   Brody Potter MD    Attending Provider   Brody Potter MD    Department, Room/Bed   [facility] CARDIAC CARE, C006/1       Discharge Date       Discharge Disposition       Discharge Destination                               Attending Provider: Brody Potter MD    Allergies: Coconut, Nuts, Penicillins, [place]    Isolation: Contact   Infection: COVID (History) (09/15/21), ESBL Klebsiella (04/07/22)   Code Status: CPR   Advance Care Planning Activity    Ht: 170.2 cm (67\")   Wt: 72.5 kg (159 lb 13.3 oz)    Admission Cmt: None   Principal Problem: Intractable nausea and vomiting [R11.2]                 Active Insurance as of 4/2/2022     Primary Coverage     Payor Plan Insurance Group Employer/Plan Group    [employer]HE[employer] BLUE CROSS ANTHEM BLUE CROSS BLUE SHIELD PPO [id number]     Payor Plan Address Payor Plan Phone Number Payor Plan Fax Number Effective Dates    [address] [phone]  1/1/2022 - None Entered    [facility] [address]       Subscriber Name Subscriber Birth Date Member ID       BRANDON COOPER GEMA 1975 [id number]           Secondary Coverage     Payor Plan Insurance Group Employer/Plan Group    MEDICARE MEDICARE A & B      Payor Plan Address Payor Plan Phone Number Payor Plan Fax Number Effective Dates    [address] [phone]  7/1/2019 - None Entered    [facility] [address]       Subscriber Name Subscriber Birth Date Member ID       NAMITA COOPER MARCELO 1975 [id number]           "       Emergency Contacts      (Rel.) Home Phone Work Phone Mobile Phone    Grant Zabala (Spouse) -- -- [phone]    Noel Johnson (Father) [phone] -- [phone]    Marquita Johnson (Mother) -- -- [phone]    Neida Zabala -- -- [phone]                                  [facility] Encounter Date/Time: 2022   Hospital Account: [id number]    MRN: [id number]   Patient:  Namita Zabala   Contact Serial #: [id number]   SSN:          ENCOUNTER             Patient Class: Inpatient   Unit: [facility]   Hospital Service: Medicine     Bed: C006/1   Admitting Provider: Brody Potter MD   Referring Physician:     Attending Provider: Brody Potter MD   Adm Diagnosis: Hypokalemia [E87.6]               PATIENT          Name: Namita Zabala : 1975 (46 yrs)   Address: [address] [place] [address] AVE Sex: Female   City: [place] [address]   County: [place]   Marital Status:  Ethnicity: NOT                                                                              Race: WHITE   Primary Care Provider: David Lara, * Patients Phone: Home Phone: [phone]     Mobile Phone: [phone]   EMERGENCY CONTACT   Contact Name Legal Guardian? Relationship to Patient Home Phone Work Phone   1. Grant Zabala  2. Noel Johnson No  No Spouse  Father    [phone]           GUARANTOR            Guarantor: Namita Zabala     : 1975   Address: [address] Sex: Female     [place] [address]     Relation to Patient: Self       Home Phone: [phone]   Guarantor ID: [id number]       Work Phone:     GUARANTOR EMPLOYER   Employer:           Status: DISABLED   COVERAGE          PRIMARY INSURANCE   Payor: DEIDRE BLUE CROSS Plan: DEIDRE BLUE CROSS BLUE SHIELD O   Group Number: [id number] Insurance Type: INDEMNITY   Subscriber Name: LOURDES ZABALAGEMA RIBEIRO Subscriber : 1975   Subscriber ID: [id number] Coverage Address:  [address]  [place] [address]   Pat. Rel. to Subscriber: Spouse Coverage Phone: [phone]   SECONDARY INSURANCE   Payor: MEDICARE Plan: MEDICARE A & B   Group Number:   Insurance Type: INDEMNITY   Subscriber Name: NAMITA COOPER Subscriber : 1975   Subscriber ID: [id number] Coverage Address: [address]  [place] [address]   Pat. Rel. to Subscriber: SELF Coverage Phone: [phone]      Contact Serial # ([id number])         2022    Chart ID ([id number])                    Brody Potter MD    Physician   Medicine   Progress Notes      Addendum   Date of Service:  22   Creation Time:  22                       Show:Clear all  [x]Manual[x]Template[x]Copied    Added by:  [x]Brody Potter MD      [][facility] for details           [facility] Medicine Services  INPATIENT PROGRESS NOTE     Patient Name: Namita Cooper  Date of Admission: 2022  Today's Date: 22  Length of Stay: 15  Primary Care Physician: David Lara MD     Subjective   Chief Complaint: AMS  HPI   Intubated/Sedated  Afebrile           Review of Systems   Unable to perform ROS: Mental status change         All pertinent negatives and positives are as above. All other systems have been reviewed and are negative unless otherwise stated.      Objective    Temp:  [95.3 °F (35.2 °C)-99 °F (37.2 °C)] 98.2 °F (36.8 °C)  Heart Rate:  [] 90  Resp:  [14-17] 15  BP: ()/(52-96) 93/55  FiO2 (%):  [30 %-40 %] 30 %  Physical Exam  Constitutional:        Physical Exam  Constitutional:       Appearance: Normal appearance. She is well-developed.      Interventions: She is sedated and intubated.   HENT:      Head: Normocephalic and atraumatic.      Right Ear: Tympanic membrane, ear canal and external ear normal.      Left Ear: Tympanic membrane, ear canal and external ear normal.      Nose: Nose normal.      Mouth/Throat:       Mouth: Mucous membranes are moist.      Pharynx: Oropharynx is clear.   Eyes:      Extraocular Movements: Extraocular movements intact.      Conjunctiva/sclera: Conjunctivae normal.      Pupils: Pupils are equal, round, and reactive to light.   Cardiovascular:      Rate and Rhythm: Normal rate and regular rhythm.      Heart sounds: Normal heart sounds.   Pulmonary:      Effort: Pulmonary effort is normal. She is intubated.      Breath sounds: Normal breath sounds.   Abdominal:      General: Bowel sounds are normal. There is no distension.      Palpations: Abdomen is soft.      Tenderness: There is no abdominal tenderness.   Musculoskeletal:         General: Normal range of motion.      Cervical back: Normal range of motion and neck supple.   Skin:     General: Skin is warm and dry.   Psychiatric:              I have reviewed the labs, radiology results, and diagnostic studies.     Laboratory Data:          Results from last 7 days   Lab Units 04/19/22  0329 04/18/22  0231 04/17/22  1417   WBC 10*3/mm3 10.38 11.08* 12.54*   HEMOGLOBIN g/dL 8.8* 7.9* 8.8*   HEMATOCRIT % 25.9* 23.5* 25.1*   PLATELETS 10*3/mm3 214 185 162                Results from last 7 days   Lab Units 04/19/22  0329 04/18/22  0231 04/17/22  0357   SODIUM mmol/L 142 140 140   POTASSIUM mmol/L 3.6 3.4* 3.8   CHLORIDE mmol/L 108* 106 104   CO2 mmol/L 24.0 25.0 25.0   BUN mg/dL 6 7 11   CREATININE mg/dL 0.91 0.92 1.33*   CALCIUM mg/dL 8.7 7.9* 7.5*   BILIRUBIN mg/dL 0.6 0.5 0.4   ALK PHOS U/L 88 74 63   ALT (SGPT) U/L 21 17 15   AST (SGOT) U/L 43* 45* 28   GLUCOSE mg/dL 91 77 126*         Urine Culture   Date Value Ref Range Status   04/14/2022 >100,000 CFU/mL Klebsiella oxytoca ESBL (A)   Final                Component Value Units Date/Time      MRI Brain With & Without Contrast [[id number]] Collected: 04/18/22 1738       Updated: 04/18/22 1752     Narrative:       HISTORY: Unresponsive, improving kidney function     MRI BRAIN: Multiplanar imaging  the brain performed pre- and post-IV  contrast.     COMPARISON: Nonenhanced MRI brain 4/16/2022     FINDINGS: There is hyperintense T2 FLAIR signal symmetrically along the  bilateral frontal parietal and occipital lobes. Increased signal on  diffusion-weighted images most likely T2 shine through artifact there is  no abnormal enhancement identified. Minimal hyperintense FLAIR signal  within the periventricular regions with few hyperintense punctate FLAIR  signal within the subcortical frontal lobes. No abnormal extra-axial  fluid collection.     Limited assessment of the orbits and base of skull unremarkable.        Impression:       1. No abnormal enhancement identified within the region of hyperintense  T2/FLAIR signal involving the bilateral frontal,parietal and occipital  cortical regions. 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 No abnormal enhancement identified.  This report was finalized on 04/18/2022 17:49 by Dr. Rina Ricardo MD.     XR Chest 1 View [[id number]] Collected: 04/18/22 1403       Updated: 04/18/22 1410     Narrative:       EXAMINATION: XR CHEST 1 VW- 4/18/2022 2:03 PM CDT     HISTORY: Intubation; E87.6-Hypokalemia; N39.0-Urinary tract infection,  site not specified; R31.9-Hematuria, unspecified; R11.2-Nausea with  vomiting, unspecified; E87.2-Acidosis; R13.10-Dysphagia, unspecified;  A41.9-Sepsis, unspecified organism; N39.0-Urinary tract infection, site  not specified.     REPORT: A frontal view the chest was obtained.     COMPARISON: Chest x-ray 2/20/2022.     The patient is now intubated, with endotracheal tube in satisfactory  position with tip approximately 1.4 cm superior to the jaime. An NG  tube has been inserted, tip is at the gastric fundus level. There is  partial visualization of the left-sided external/internal percutaneous  nephrostomy nephrostomy catheter, the tip is shown to be over the  midline exterior to the patient on  previous CT of the abdomen on  4/15/2022. There is mild atelectasis in the left lung base. No lung  consolidation is identified. Heart size is normal. No pneumothorax or  pleural effusion is identified.        Impression:       Satisfactory position of the endotracheal tube and NG tube.  Mild volume loss in the lung bases, greater on the left. No lung  consolidation is identified. There is no pneumothorax or pleural  effusion.        This report was finalized on 04/18/2022 14:07 by Dr. Florian Laurent MD.             I have reviewed the patient's current medications.      Assessment/Plan           Active Hospital Problems     Diagnosis     • **Intractable nausea and vomiting     • Severe malnutrition (CMS/HCC)     • Hypophosphatemia      Hospital Problems     Diagnosis     • **Intractable nausea and vomiting     • Severe malnutrition (CMS/HCC)     • Hypophosphatemia     • Hypokalemia     • UTI (urinary tract infection), bacterial     • Malfunction of nephrostomy tube (HCC)     • Lactic acidosis     • Sepsis secondary to UTI (HCC)     • Essential (primary) hypertension        1.  AMS:  Metabolic Encephalopathy vs Encephalitis  -Encephalitis panel pending  -Paraneoplastic panel pending  -Neuro following  -on Empiric Keppra     2.  Thigh hematoma  -AC held  -Surgery     3.  PE  -will get duplex  -will discuss with surgery if/when ok to resume AC     4.  ESBL UTI  -IV Abx  -ID following     5.  HTN  -monitor     6.  History of bladder rupture  -continue stratton     7.  Presence of Nephrostomy tubes  -monitor  -will need removal at some point, can be done as outpatient     DVT PPX:  SCD's        Discharge Planning: Transfer arrangements pending.     Electronically signed   Brody Potter MD, 04/19/22, 07:55 CDT.                Abiola Yan MD    Physician   Neurology   Consults      Addendum   Date of Service:  04/16/22 1018   Creation Time:  04/16/22 1018           Consult Orders   Inpatient Neurology Consult  General [[id number]] ordered by Amado Villarreal MD at 04/16/22 0758          Expand AllCollapse All          Show:Clear all  [x]Manual[x]Template[x]Copied    Added by:  [x]Abiola Yan MD      [][facility] for details           Neurology Consult Note     Patient:  Namita Zabala   YOB: 1975  MRN:  [id number]  Date of Admission:  4/2/2022  8:46 PM  Date: 4/16/2022     Referring Provider: Amado Villarreal MD  Reason for Consultation: Neuro decline. Went from following commands to not. Eyes deviate up and out.        History of present illness:      This is a 46 y.o.  female with H/O migraine, neurocardiogenic syncope, angina at rest, mitral valve prolapse  a fairly complicated PMH described below and now evaluated for altered mental status and neurological decline     IN August 2021 patient had Covid and developed hypercoagulable state with pulmonary embolism for which anticoagulation was started and then developed an acute large left hematoma which caused obstruction of ureters leading to ARF requiring dialysis.  She then was in LTAC recovering and ten sent home but stopped urinating and found to have hematoma infection and obstruction of bladder and went to [place]  For bilateral percutaneous nephrostomy tube placed and multiple surgeries for necrotizing soft tissue infection  She was readmitted to [facility] in January 2022 for displaced nephrostomy tube and sent back to SNF  However she was brought back and admitted here on 4/2/22 for nausea and vomiting and found to have UTI       Neuro was consulted 4/11/22  as patient was not conversant as previously was.  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 Neuro signed off      Since then she has been somnolent, in soft restraints as she pulled out feeding  tube 4 times in one day. She was found to have ESBL, Klebsiella and Proteus UTI on admission and now GN bacilli of> 100, 00   Currently on Invanz, Neomycin for ears   Was on Vancomycin and had 1 time dose.      However since neuro last saw her and in past 24 hours she's had some more metabolic issues with substantial hemoglobin drop, hypotension requiring levophed and is still on this  CBC       She was transfused.      CT of abdomen indicated concern for a left abductor major abscess.      Apparently sometime yesterday her eyes deviated over and she became unresponsive  Now she will open her eyes and moan but does not communicate     During the night nursing thought she had possible tremor and not definite. None today     She will open eyes and may look toward the sound  Initially eyes deviated up and out when open but this was while unresponsive     Last time she followed commands at 4 AM today  Got central line at 6 AM and was following commands but remaining nonverbal  No fevers but had been on forced air  Off of forced air last temperature was 96.9     Medical History        Past Medical History:   Diagnosis Date   • Angina at rest (HCC)     • Migraine       Sees Pain Management for injections.    • MVP (mitral valve prolapse)     • Renal disorder     • Syncope         Exam:  Head:  Normal cephalic, atraumatic  HEENT:  Neck supple  Fundoscopic Exam:  No signs of disc edema  CVS:  Regular rate and rhythm.  No murmurs  Carotid Examination:  No bruits  Lungs:  Clear to auscultation  Abdomen:  Non-tender, Non-distended  Extremities:  No signs of peripheral edema  Skin:  No rashes     Neurologic Exam:     Mental Status:    -Opens eyes to name or loud noise but stares.  Somewhat looks toward sound but does not really visually connect. Does not follow any commands  Grimaces to significant noxious stimuli but mostly stares     CN II:  Visual fields--no response to visual threat Pupils equally reactive to light 8 mm to  5 mm  CN III, IV, VI:  Extraocular Muscles conjugate but does not follow  No dolls eyes  CN V:  Facial sensory--unable to assess  CN VII:  Facial motor symmetric  Grimace symmetric  CN VIII:  Gross hearing --she hears sound based on her eyes opening   CN IX/X:  Breathes on own and can cough  CN XI:  Shoulder shrug --unable to assess  CN XII:  Tongue protrusion--unable to assess     Motor: (strength out of 5:  1= minimal movement, 2 = movement in plane of gravity, 3 = movement against gravity, 4 = movement against some resistance, 5 = full strength)     No clear spontaneous movement  Withdraws all 4 extremities to noxious stimuli  Has increased tone left upper extremity     DTR:  -Right              Bicep: 3+         Triceps: 3+      Brachioradialis: 3+              Patella: 2+       Ankle: 2+         Neg Babinski  -Left              Bicep: 3+         Triceps: 3+      Brachioradialis: 3+              Patella: 2+       Ankle: 2+         Neg Babinski     Sensory:  -Unable to assess to light touch, pinprick, temperature, pain, and proprioception but does feel deep nail bed pressure        Coordination:  -Finger to nose/Heel to shin --no spontaneous movement  -No ataxia     Gait  -Unable to walk   Impression     · Metabolic encephalopathy superimposed on possible Covid encephalopathy verses other especially since this seems to be progressing  · Anemia with hemoglobin of 4.8 and hypotension currently a contributing factor  · Hypokalemic of 3.1  · Hypomagnesemia of 1.5  · Renal failure  · Multiple infections--UTIs ? thigh abscess but maybe etc.     Plan     Head CT without  EEG --unable to obtain now due to tech no longer on call but will be tomorrow  Consider LP depending on results        ADDENDUM: No changes on Head CT in comparison to one done 4/11/22  Will obtain MRI     I discussed the patients findings and my recommendations with nursing staff and DR Villarreal      45 minutes of critical care time was performed ,during  this time I consulted with the nursing staff and also with other providers in regard to the patient's care. I reviewed records as she is unable to provide a history, examined the patient reviewed labs and personally reviewed neuroimages of head CT  For a 46 year old she appears to have atrophy         Abiola Titus MD  04/16/22  10:18 CDT              Current Facility-Administered Medications   Medication Dose Route Frequency Provider Last Rate Last Admin   • ertapenem (INVanz) 1 g in sodium chloride 0.9 % 100 mL IVPB-VTB  1 g Intravenous Q24H Amado Villarreal  mL/hr at 04/18/22 1714 1 g at 04/18/22 1714   • famotidine (PEPCID) injection 20 mg  20 mg Intravenous Q12H Amado Villarreal MD   20 mg at 04/19/22 0936   • FLUoxetine (PROzac) capsule 20 mg  20 mg Oral Nightly Amado Villarreal MD   20 mg at 04/18/22 2110   • fluticasone (FLONASE) 50 MCG/ACT nasal spray 2 spray  2 spray Each Nare BID Frank Ricks MD   2 spray at 04/19/22 0855   • folic acid (FOLVITE) tablet 1 mg  1 mg Oral Daily Amado Villarreal MD   1 mg at 04/18/22 2110   • Hold medication  1 each Does not apply Continuous PRN Abiola Yan MD       • hydrALAZINE (APRESOLINE) injection 10 mg  10 mg Intravenous Q4H PRN Amado Villarreal MD       • labetalol (NORMODYNE,TRANDATE) injection 10 mg  10 mg Intravenous Q4H PRN Amado Villarreal MD   10 mg at 04/10/22 1518   • lactated ringers infusion  75 mL/hr Intravenous Continuous Amado Villarreal MD 75 mL/hr at 04/19/22 0445 75 mL/hr at 04/19/22 0445   • levETIRAcetam in NaCl 0.82% (KEPPRA) IVPB 500 mg  500 mg Intravenous Q12H Abiola Yan MD   500 mg at 04/19/22 0855   • Morphine sulfate (PF) injection 2 mg  2 mg Intravenous Q4H PRN Amado Villarreal C, MD       • neomycin-polymyxin-hydrocortisone (CORTISPORIN) otic suspension 4 drop  4 drop Both Ears Q6H Fermin Mcclure, DO   4 drop at 04/19/22 0549   • norepinephrine (LEVOPHED) 8 mg in 250 mL NS infusion (premix)  0.02-0.3  mcg/kg/min Intravenous Titrated Brody Potter MD   Stopped at 04/18/22 1715   • ondansetron (ZOFRAN) tablet 4 mg  4 mg Oral Q6H PRN Frank Ricks MD        Or   • ondansetron (ZOFRAN) injection 4 mg  4 mg Intravenous Q6H PRN Frank Ricks MD   4 mg at 04/16/22 1000   • potassium chloride (MICRO-K) CR capsule 40 mEq  40 mEq Oral PRN Amado Villarreal MD        Or   • potassium chloride (KLOR-CON) packet 40 mEq  40 mEq Oral PRN Amado Villarreal MD        Or   • potassium chloride 10 mEq in 100 mL IVPB  10 mEq Intravenous Q1H PRN Amado Villarreal  mL/hr at 04/19/22 1046 10 mEq at 04/19/22 1046   • potassium phosphate 45 mmol in sodium chloride 0.9 % 500 mL infusion  45 mmol Intravenous PRN Amado Villarreal MD        Or   • potassium phosphate 30 mmol in sodium chloride 0.9 % 250 mL infusion  30 mmol Intravenous PRN Amado Villarreal MD 31.3 mL/hr at 04/16/22 1900 30 mmol at 04/16/22 1900    Or   • Potassium Phosphates 15 mmol in sodium chloride 0.9 % 100 mL infusion  15 mmol Intravenous PRN Amado Villarreal MD        Or   • sodium phosphates 45 mmol in sodium chloride 0.9 % 500 mL IVPB  45 mmol Intravenous PRN Amado Villarreal MD        Or   • sodium phosphates 30 mmol in sodium chloride 0.9 % 250 mL IVPB  30 mmol Intravenous PRN Amado Villarreal MD        Or   • sodium phosphates 15 mmol in sodium chloride 0.9 % 250 mL IVPB  15 mmol Intravenous PRN Amado Villarreal MD       • propofol (DIPRIVAN) infusion 10 mg/mL 100 mL  5-50 mcg/kg/min Intravenous Titrated Brody Potter MD 8.65 mL/hr at 04/19/22 1045 20 mcg/kg/min at 04/19/22 1045   • sodium bicarbonate tablet 650 mg  650 mg Oral BID Amado Villarreal MD   650 mg at 04/19/22 0855   • sodium chloride 0.9 % flush 10 mL  10 mL Intravenous PRN Frank Ricks MD       • sodium chloride 0.9 % flush 10 mL  10 mL Intravenous PRN Frank Ricks MD       • sodium chloride 0.9 % flush 10 mL  10 mL Intravenous PRN Frank Ricks MD       • sodium  chloride 0.9 % flush 10 mL  10 mL Intravenous Q12H Frank Ricks MD   10 mL at 04/19/22 0856   • sodium chloride 0.9 % flush 10 mL  10 mL Intravenous PRN Frank Ricks MD   10 mL at 04/10/22 1514

## 2022-04-20 ENCOUNTER — APPOINTMENT (OUTPATIENT)
Dept: GENERAL RADIOLOGY | Facility: HOSPITAL | Age: 47
End: 2022-04-20

## 2022-04-20 LAB
ALBUMIN SERPL-MCNC: 1.7 G/DL (ref 3.5–5.2)
ALBUMIN/GLOB SERPL: 0.7 G/DL
ALP SERPL-CCNC: 78 U/L (ref 39–117)
ALT SERPL W P-5'-P-CCNC: 20 U/L (ref 1–33)
ANION GAP SERPL CALCULATED.3IONS-SCNC: 7 MMOL/L (ref 5–15)
AST SERPL-CCNC: 46 U/L (ref 1–32)
BACTERIA SPEC AEROBE CULT: NORMAL
BASOPHILS # BLD AUTO: 0.06 10*3/MM3 (ref 0–0.2)
BASOPHILS NFR BLD AUTO: 0.7 % (ref 0–1.5)
BILIRUB SERPL-MCNC: 0.5 MG/DL (ref 0–1.2)
BUN SERPL-MCNC: 5 MG/DL (ref 6–20)
BUN/CREAT SERPL: 7 (ref 7–25)
CALCIUM SPEC-SCNC: 8.3 MG/DL (ref 8.6–10.5)
CHLORIDE SERPL-SCNC: 111 MMOL/L (ref 98–107)
CO2 SERPL-SCNC: 24 MMOL/L (ref 22–29)
CREAT SERPL-MCNC: 0.71 MG/DL (ref 0.57–1)
DEPRECATED RDW RBC AUTO: 46.8 FL (ref 37–54)
EGFRCR SERPLBLD CKD-EPI 2021: 107.7 ML/MIN/1.73
EOSINOPHIL # BLD AUTO: 0.46 10*3/MM3 (ref 0–0.4)
EOSINOPHIL NFR BLD AUTO: 5.6 % (ref 0.3–6.2)
ERYTHROCYTE [DISTWIDTH] IN BLOOD BY AUTOMATED COUNT: 15.9 % (ref 12.3–15.4)
GLOBULIN UR ELPH-MCNC: 2.4 GM/DL
GLUCOSE BLDC GLUCOMTR-MCNC: 72 MG/DL (ref 70–130)
GLUCOSE BLDC GLUCOMTR-MCNC: 75 MG/DL (ref 70–130)
GLUCOSE SERPL-MCNC: 79 MG/DL (ref 65–99)
GRAM STN SPEC: NORMAL
HCT VFR BLD AUTO: 22.4 % (ref 34–46.6)
HGB BLD-MCNC: 7.4 G/DL (ref 12–15.9)
IMM GRANULOCYTES # BLD AUTO: 0.39 10*3/MM3 (ref 0–0.05)
IMM GRANULOCYTES NFR BLD AUTO: 4.7 % (ref 0–0.5)
LYMPHOCYTES # BLD AUTO: 1.33 10*3/MM3 (ref 0.7–3.1)
LYMPHOCYTES NFR BLD AUTO: 16.1 % (ref 19.6–45.3)
MCH RBC QN AUTO: 29.5 PG (ref 26.6–33)
MCHC RBC AUTO-ENTMCNC: 33 G/DL (ref 31.5–35.7)
MCV RBC AUTO: 89.2 FL (ref 79–97)
MONOCYTES # BLD AUTO: 0.72 10*3/MM3 (ref 0.1–0.9)
MONOCYTES NFR BLD AUTO: 8.7 % (ref 5–12)
NEUTROPHILS NFR BLD AUTO: 5.31 10*3/MM3 (ref 1.7–7)
NEUTROPHILS NFR BLD AUTO: 64.2 % (ref 42.7–76)
NRBC BLD AUTO-RTO: 0 /100 WBC (ref 0–0.2)
PLATELET # BLD AUTO: 218 10*3/MM3 (ref 140–450)
PMV BLD AUTO: 9.7 FL (ref 6–12)
POTASSIUM SERPL-SCNC: 3.7 MMOL/L (ref 3.5–5.2)
PROT SERPL-MCNC: 4.1 G/DL (ref 6–8.5)
RBC # BLD AUTO: 2.51 10*6/MM3 (ref 3.77–5.28)
SODIUM SERPL-SCNC: 142 MMOL/L (ref 136–145)
WBC NRBC COR # BLD: 8.27 10*3/MM3 (ref 3.4–10.8)

## 2022-04-20 PROCEDURE — 25010000002 ERTAPENEM PER 500 MG: Performed by: FAMILY MEDICINE

## 2022-04-20 PROCEDURE — 25010000002 LEVETIRACETAM IN NACL 0.82% 500 MG/100ML SOLUTION: Performed by: PSYCHIATRY & NEUROLOGY

## 2022-04-20 PROCEDURE — 85025 COMPLETE CBC W/AUTO DIFF WBC: CPT | Performed by: FAMILY MEDICINE

## 2022-04-20 PROCEDURE — C1751 CATH, INF, PER/CENT/MIDLINE: HCPCS

## 2022-04-20 PROCEDURE — 25010000002 PROPOFOL 10 MG/ML EMULSION: Performed by: FAMILY MEDICINE

## 2022-04-20 PROCEDURE — 02HV33Z INSERTION OF INFUSION DEVICE INTO SUPERIOR VENA CAVA, PERCUTANEOUS APPROACH: ICD-10-PCS | Performed by: FAMILY MEDICINE

## 2022-04-20 PROCEDURE — 74018 RADEX ABDOMEN 1 VIEW: CPT

## 2022-04-20 PROCEDURE — 99232 SBSQ HOSP IP/OBS MODERATE 35: CPT | Performed by: PSYCHIATRY & NEUROLOGY

## 2022-04-20 PROCEDURE — 71045 X-RAY EXAM CHEST 1 VIEW: CPT

## 2022-04-20 PROCEDURE — 82962 GLUCOSE BLOOD TEST: CPT

## 2022-04-20 PROCEDURE — 94003 VENT MGMT INPAT SUBQ DAY: CPT

## 2022-04-20 PROCEDURE — 80053 COMPREHEN METABOLIC PANEL: CPT | Performed by: FAMILY MEDICINE

## 2022-04-20 PROCEDURE — 94799 UNLISTED PULMONARY SVC/PX: CPT

## 2022-04-20 PROCEDURE — 99232 SBSQ HOSP IP/OBS MODERATE 35: CPT

## 2022-04-20 RX ORDER — LIDOCAINE HYDROCHLORIDE 10 MG/ML
1 INJECTION, SOLUTION EPIDURAL; INFILTRATION; INTRACAUDAL; PERINEURAL ONCE
Status: COMPLETED | OUTPATIENT
Start: 2022-04-20 | End: 2022-04-20

## 2022-04-20 RX ORDER — FLUOXETINE HYDROCHLORIDE 20 MG/1
20 CAPSULE ORAL NIGHTLY
Status: DISCONTINUED | OUTPATIENT
Start: 2022-04-20 | End: 2022-04-21 | Stop reason: HOSPADM

## 2022-04-20 RX ORDER — SODIUM BICARBONATE 650 MG/1
650 TABLET ORAL 2 TIMES DAILY
Status: DISCONTINUED | OUTPATIENT
Start: 2022-04-20 | End: 2022-04-21 | Stop reason: HOSPADM

## 2022-04-20 RX ORDER — FOLIC ACID 1 MG/1
1 TABLET ORAL DAILY
Status: DISCONTINUED | OUTPATIENT
Start: 2022-04-20 | End: 2022-04-21 | Stop reason: HOSPADM

## 2022-04-20 RX ADMIN — FLUOXETINE HYDROCHLORIDE 20 MG: 20 CAPSULE ORAL at 20:09

## 2022-04-20 RX ADMIN — ERTAPENEM SODIUM 1 G: 1 INJECTION, POWDER, LYOPHILIZED, FOR SOLUTION INTRAMUSCULAR; INTRAVENOUS at 15:34

## 2022-04-20 RX ADMIN — SODIUM BICARBONATE 650 MG: 650 TABLET ORAL at 08:03

## 2022-04-20 RX ADMIN — SODIUM CHLORIDE, POTASSIUM CHLORIDE, SODIUM LACTATE AND CALCIUM CHLORIDE 75 ML/HR: 600; 310; 30; 20 INJECTION, SOLUTION INTRAVENOUS at 11:59

## 2022-04-20 RX ADMIN — FAMOTIDINE 20 MG: 10 INJECTION INTRAVENOUS at 08:03

## 2022-04-20 RX ADMIN — FLUTICASONE PROPIONATE 2 SPRAY: 50 SPRAY, METERED NASAL at 08:03

## 2022-04-20 RX ADMIN — PROPOFOL INJECTABLE EMULSION 20 MCG/KG/MIN: 10 INJECTION, EMULSION INTRAVENOUS at 11:15

## 2022-04-20 RX ADMIN — NEOMYCIN SULFATE, POLYMYXIN B SULFATE AND HYDROCORTISONE 4 DROP: 10; 3.5; 1 SUSPENSION/ DROPS AURICULAR (OTIC) at 06:12

## 2022-04-20 RX ADMIN — SODIUM BICARBONATE 650 MG: 650 TABLET ORAL at 20:09

## 2022-04-20 RX ADMIN — NEOMYCIN SULFATE, POLYMYXIN B SULFATE AND HYDROCORTISONE 4 DROP: 10; 3.5; 1 SUSPENSION/ DROPS AURICULAR (OTIC) at 00:22

## 2022-04-20 RX ADMIN — FOLIC ACID 1 MG: 1 TABLET ORAL at 20:09

## 2022-04-20 RX ADMIN — Medication 10 ML: at 08:03

## 2022-04-20 RX ADMIN — PROPOFOL INJECTABLE EMULSION 20 MCG/KG/MIN: 10 INJECTION, EMULSION INTRAVENOUS at 04:50

## 2022-04-20 RX ADMIN — LEVETIRACETAM 500 MG: 5 INJECTION INTRAVASCULAR at 08:03

## 2022-04-20 RX ADMIN — NEOMYCIN SULFATE, POLYMYXIN B SULFATE AND HYDROCORTISONE 4 DROP: 10; 3.5; 1 SUSPENSION/ DROPS AURICULAR (OTIC) at 12:03

## 2022-04-20 RX ADMIN — FAMOTIDINE 20 MG: 10 INJECTION INTRAVENOUS at 20:09

## 2022-04-20 RX ADMIN — LIDOCAINE HYDROCHLORIDE 1 ML: 10 INJECTION, SOLUTION EPIDURAL; INFILTRATION; INTRACAUDAL; PERINEURAL at 11:35

## 2022-04-20 RX ADMIN — Medication 10 ML: at 20:09

## 2022-04-20 RX ADMIN — LEVETIRACETAM 500 MG: 5 INJECTION INTRAVASCULAR at 20:09

## 2022-04-20 RX ADMIN — FLUTICASONE PROPIONATE 2 SPRAY: 50 SPRAY, METERED NASAL at 20:10

## 2022-04-20 NOTE — PROGRESS NOTES
No significant change in patient's status.   at bedside.  As I explained to him I feel like the area in her left groin most likely represents a spontaneous bleed/hematoma.  Its not showing any evidence on clinical exam of abscess.  It has been incompletely imaged so we have discussed with the primary team in the past regarding full imaging of this area.  Her neurologic status has not changed significantly.  There is still a goal of getting her to .  Her abdominal x-ray this morning shows less small bowel distention.  Feel is reasonable to begin tube feeds via the NG tube slowly.

## 2022-04-20 NOTE — PROGRESS NOTES
"Adult Nutrition  Assessment/PES    Patient Name:  Namita Zabala  YOB: 1975  MRN: [id number]  Admit Date:  4/2/2022    Assessment Date:  4/20/2022    Comments:  Nutrition follow up; consult received this am, ok to start trickle feeds per Dr. Scott. KUB this am shows improvement in ileus.  NG tube in place. Recommend and placed orders to start Peptamen Intense VHP at 15 ml/hr with water flush 15 ml/hr.  If Pt tolerates, may advance rate by 10 ml/hr every 24 hrs, to reach goal rate of 55 ml/hr in 4 days.  RD will continue to follow.     Reason for Assessment     Row Name 04/20/22 1046          Reason for Assessment    Reason For Assessment follow-up protocol;physician consult;TF/PN                Nutrition/Diet History     Row Name 04/20/22 1047          Nutrition/Diet History    Typical Intake (Food/Fluid/EN/PN) Pt has NGT in place, off LIS. Cnslt to start \"trickle feeds\" per Dr. Jose today.     Factors Affecting Nutritional Intake cognitive status/motor function;altered gastrointestinal function;respiratory difficulty/therapies                Anthropometrics     Row Name 04/20/22 1056 04/20/22 0400       Anthropometrics    Weight -- 72.2 kg (159 lb 2.8 oz)    Age for Calculations 46 --    VE (L/min) for Calculation 7.76 --    Tmax (Celcius) for Calculation 36.4 --    Height for Calculation 1.702 m (5' 7\") --    Weight for Calculation 72.2 kg (159 lb 2.8 oz) --               Labs/Tests/Procedures/Meds     Row Name 04/20/22 1048          Labs/Procedures/Meds    Lab Results Reviewed reviewed, pertinent     Lab Results Comments BUN, T.Protein, AST, Albumin, H/H            Diagnostic Tests/Procedures    Diagnostic Test/Procedures Comments repeat KUB this am shows improvement in ileus            Medications    Pertinent Medications Reviewed reviewed, pertinent     Pertinent Medications Comments see MAR                  Estimated/Assessed Needs - Anthropometrics     Row Name 04/20/22 1056 04/20/22 " "0400       Anthropometrics    Weight -- 72.2 kg (159 lb 2.8 oz)    Age for Calculations 46 --    VE (L/min) for Calculation 7.76 --    Tmax (Celcius) for Calculation 36.4 --    Height for Calculation 1.702 m (5' 7\") --    Weight for Calculation 72.2 kg (159 lb 2.8 oz) --       Estimated/Assessed Needs    Additional Documentation [place] State Equation (Group);Protein Requirements (Group) --       Estimated Calorie Needs    Estimated Calorie Requirement (kcal/day) 9480-0235 kcal/d --    Estimated Calorie Need Method [place] State --       KCAL/KG    20 Kcal/Kg (kcal) 1444 --    25 Kcal/Kg (kcal) 1805 --    35 Kcal/Kg (kcal) 2527 --    40 Kcal/Kg (kcal) 2888 --       [place] State Equation    RMR ([place] State Equation) 1385.07 --       Modified [place] State Equation    RMR (Modified [place] State Equation) 1501.98 --       Protein Requirements    Weight Used For Protein Calculations 72.2 kg (159 lb 2.8 oz) --    Est Protein Requirement Amount (gms/kg) 1.8 gm protein  recommend 1.5 to 1.8 gm/kg/d  108-130 gm PRO/d --    Estimated Protein Requirements (gms/day) 129.96 --       Fluid Requirements    Fluid Requirements (mL/day) 1385.07 --    Estimated Fluid Requirement Method RDA Method --    RDA Method (mL) 1385.07 --               Nutrition Prescription Ordered     Row Name 04/20/22 1101          Nutrition Prescription PO    Current PO Diet NPO            Nutrition Prescription EN    Enteral Route NG  orders to start trickle feeds this am            Propofol Considerations    Propofol (mL/hr) 8.65 mL/hr     Propofol (Kcal/day) 228.36 Kcal/day                Evaluation of Received Nutrient/Fluid Intake     Row Name 04/20/22 1101          Nutrient/Fluid Evaluation    Number of Days Evaluated 3 days     Additional Documentation Fluid Intake Evaluation (Group)            Calories Evaluation    Total Calorie Target (kcal) 1385     Oral Calories (kcal) 0     Enteral Calories (kcal) 0     Other Calories (kcal) 140.99  propofol avg kcal/d     "        Protein Evaluation    Total Protein Target (g) 108     Oral Protein (g) 0     Enteral Protein (g) 0     Parenteral Protein (g) 0            Intake Assessment    Energy/Calorie Requirement Assessment not meeting needs     Protein Requirement Assessment not meeting needs            Fluid Intake Evaluation    Oral Fluid (mL) 0     Enteral Fluid (mL) 30  ? NG tube flush, no TF given in 5 days     Other Fluid (mL) 2257.13  IVF and Blood            PO Evaluation    % PO Intake NPO            EN Evaluation    TF Changes Discontinued                     Problem/Interventions:   Problem 1     Row Name 04/20/22 1105          Nutrition Diagnoses Problem 1    Problem 1 Malnutrition     Etiology (related to) Factors Affecting Nutrition;Medical Diagnosis     Gastrointestinal Vomiting;Nausea;Other (comment)  intractable     Reported GI Symptoms N & V  improvement since Pt has been off TF     Signs/Symptoms (evidenced by) Unintended Weight Change;NPO     Unintended Weight Change Loss     Number of Pounds Lost 86     Weight loss time period 6 months                Problem 2     Row Name 04/20/22 1106          Nutrition Diagnoses Problem 2    Problem 2 Inadequate Intake/Infusion     Inadequate Intake Type Oral;Enteral     Macronutrient Kcal     Micronutrient Vitamin;Mineral     Etiology (related to) Medical Diagnosis;Factors Affecting Nutrition     Gastrointestinal Nausea;Vomiting;Other (comment)  improvement in ileus per KUB     Neurological AMS     Other NGT for AMONS 4/14 but no TF provided; NGT has been to LIS, now ready to start trophic feeding.     Signs/Symptoms (evidenced by) Other (comment);NPO;EN Intake Delivery;No EN Route Available     Percent (%) of EN goal 0 %                    Intervention Goal     Row Name 04/20/22 1107          Intervention Goal    General Nutrition support treatment;Reduce/improve symptoms;Meet nutritional needs for age/condition;Disease management/therapy     TF/PN Establish TF  tolerance;Inititiate TF/PN     Weight No significant weight loss                Nutrition Intervention     Row Name 04/20/22 1107          Nutrition Intervention    RD/Tech Action Follow Tx progress;Care plan reviewd;Recommend/ordered     Recommended/Ordered EN                Nutrition Prescription     Row Name 04/20/22 1107          Nutrition Prescription EN    Enteral Prescription Enteral to supply;Enteral begin/change     Enteral Route NG     Product Peptamen AF     TF Delivery Method Continuous     Continuous TF Goal Rate (mL/hr) 55 mL/hr     Continuous TF Starting Rate (mL/hr) 15 mL/hr  advance by 10 ml/hr every 24 hrs as tolerated     Continuous TF Goal Volume (mL) 1210 mL     Continuous TF Starting Volume (mL) 360 mL     Water flush (mL)  20 mL     Water Flush Frequency Per hour     New EN Prescription Ordered? Yes            EN to Supply    Kcal/Day 1210 Kcal/Day     Kcal/Kg 16.76 Kcal/Kg     Kcal/Kg Weight Method Actual weight     Kcal/day with Propofol  1438.36 Kcal/day with Propofol     Protein (gm/day) 111 gm/day     Meet Estimated Kcal Need (%) 100 %     Meet Estimated Protein Need (%) 100 %     TF Free H2O (mL) 1016.4 mL     Total Free H2O (mL/day) 1346.4 mL/day     Fiber Per Day (gm/day) 4.84 gm/day                Education/Evaluation     Row Name 04/20/22 1117          Education    Education Other (comment)  d/c needs: pending transfer to [facility], RD at facility to continue to assess/treat nutr needs per facility protocol            Monitor/Evaluation    Monitor Per protocol                 Electronically signed by:  Catherine Coles RDN, LINDA  04/20/22 11:26 CDT

## 2022-04-20 NOTE — PROGRESS NOTES
[facility] Medicine Services  INPATIENT PROGRESS NOTE    Patient Name: Namita Zabala  Date of Admission: 4/2/2022  Today's Date: 04/20/22  Length of Stay: 16  Primary Care Physician: David Lara MD    Subjective   Chief Complaint: AMS  Weakness - Generalized    Dizziness       Intubated/Sedated  Afebrile        Review of Systems   Unable to perform ROS: Mental status change        All pertinent negatives and positives are as above. All other systems have been reviewed and are negative unless otherwise stated.     Objective    Temp:  [96.5 °F (35.8 °C)-97.5 °F (36.4 °C)] 96.5 °F (35.8 °C)  Heart Rate:  [] 90  Resp:  [15-19] 16  BP: ()/(53-93) 105/74  FiO2 (%):  [30 %] 30 %  Physical Exam  Constitutional:       Appearance: Normal appearance. She is well-developed.      Interventions: She is sedated and intubated.   HENT:      Head: Normocephalic and atraumatic.      Right Ear: Tympanic membrane, ear canal and external ear normal.      Left Ear: Tympanic membrane, ear canal and external ear normal.      Nose: Nose normal.      Mouth/Throat:      Mouth: Mucous membranes are moist.      Pharynx: Oropharynx is clear.   Eyes:      Extraocular Movements: Extraocular movements intact.      Conjunctiva/sclera: Conjunctivae normal.      Pupils: Pupils are equal, round, and reactive to light.   Cardiovascular:      Rate and Rhythm: Normal rate and regular rhythm.      Heart sounds: Normal heart sounds.   Pulmonary:      Effort: Pulmonary effort is normal. She is intubated.      Breath sounds: Normal breath sounds.   Abdominal:      General: Bowel sounds are normal. There is no distension.      Palpations: Abdomen is soft.      Tenderness: There is no abdominal tenderness.   Musculoskeletal:         General: Normal range of motion.      Cervical back: Normal range of motion and neck supple.   Skin:     General: Skin is warm and dry.             Results Review:  I  have reviewed the labs, radiology results, and diagnostic studies.    Laboratory Data:   Results from last 7 days   Lab Units 04/20/22  0417 04/19/22  0329 04/18/22  0231   WBC 10*3/mm3 8.27 10.38 11.08*   HEMOGLOBIN g/dL 7.4* 8.8* 7.9*   HEMATOCRIT % 22.4* 25.9* 23.5*   PLATELETS 10*3/mm3 218 214 185        Results from last 7 days   Lab Units 04/20/22  0417 04/19/22  1650 04/19/22 0329 04/18/22  0231   SODIUM mmol/L 142  --  142 140   POTASSIUM mmol/L 3.7 4.5 3.6 3.4*   CHLORIDE mmol/L 111*  --  108* 106   CO2 mmol/L 24.0  --  24.0 25.0   BUN mg/dL 5*  --  6 7   CREATININE mg/dL 0.71  --  0.91 0.92   CALCIUM mg/dL 8.3*  --  8.7 7.9*   BILIRUBIN mg/dL 0.5  --  0.6 0.5   ALK PHOS U/L 78  --  88 74   ALT (SGPT) U/L 20  --  21 17   AST (SGOT) U/L 46*  --  43* 45*   GLUCOSE mg/dL 79  --  91 77       Culture Data:   Blood Culture   Date Value Ref Range Status   04/16/2022 No growth at 2 days  Preliminary   04/16/2022 No growth at 2 days  Preliminary   04/14/2022 No growth at 3 days  Preliminary     Urine Culture   Date Value Ref Range Status   04/14/2022 >100,000 CFU/mL Klebsiella oxytoca ESBL (A)  Final     Comment:     Consider infectious disease consult.  Susceptibility results may not correlate to clinical outcomes.       Radiology Data:   Imaging Results (Last 24 Hours)     Procedure Component Value Units Date/Time    XR Chest 1 View [[id number]] Collected: 04/20/22 0723     Updated: 04/20/22 0727    Narrative:      HISTORY: Intubated     CXR: Frontal view the chest obtained     COMPARISON: 4/19/2022     FINDINGS: The endotracheal tube is slightly low-lying with the tip  position 1.5 cm above the jaime. Enteric tube tip in the fundus of the  stomach.     There are bibasilar densities, patchy atelectasis or possibly pneumonia.  No radiographic evidence of edema. No pleural effusion or pneumothorax.  Stable mediastinal contours.       Impression:      1. Slightly low-lying endotracheal tube. Enteric tube tip in the  fundus  of stomach.  2. Bibasilar densities, slightly increased from yesterday's exam  represent patchy atelectasis, however early pneumonia considered.  This report was finalized on 04/20/2022 07:24 by Dr. Rina Ricardo MD.    XR Abdomen KUB [[id number]] Collected: 04/20/22 0714     Updated: 04/20/22 0718    Narrative:      EXAMINATION: KV radiograph 4/20/2022     HISTORY: Ileus.     FINDINGS: Today's exam is compared to previous study of 4 days earlier.  There is improving colonic and small bowel distention consistent with a  resolving ileus. A left-sided nephrostomy tube and ureteral stent are  stable in position from the previous exam. A NG tube remains in place.  There is no evidence of free air.       Impression:      1.. Improving ileus with diminishing small bowel and colonic distention.  This report was finalized on 04/20/2022 07:15 by Dr. Yuniel De Jesus MD.    XR Chest 1 View [[id number]] Collected: 04/20/22 0711     Updated: 04/20/22 0716    Narrative:      EXAMINATION: Chest 1 view 4/19/2022     HISTORY: Tube exchange     FINDINGS: Upright frontal projection of the chest is compared to  previous exam of one day earlier. An endotracheal tube and NG tube  remain in place. There is elevation of the right diaphragm with mild  right basilar atelectasis. Lungs are otherwise clear. There is no  effusion or free air present.       Impression:      1.. No interval line changes.  2. Elevated right diaphragm with right basilar atelectasis.  This report was finalized on 04/20/2022 07:12 by Dr. Yuniel De Jesus MD.    US Venous Doppler Lower Extremity Bilateral (duplex) [[id number]] Collected: 04/19/22 1527     Updated: 04/19/22 1531    Narrative:      History: Swelling       Impression:      Impression: There is no evidence of deep venous thrombosis or  superficial thrombophlebitis of right or left lower extremities.     Comments: Bilateral lower extremity venous duplex exam was performed  using color Doppler  flow, Doppler waveform analysis, and grayscale  imaging, with and without compression. There is no evidence of deep  venous thrombosis in the common femoral, superficial femoral, popliteal,  peroneal, anterior tibial, and posterior tibial veins bilaterally. No  thrombus is identified in the saphenofemoral junctions and greater  saphenous veins bilaterally.         This report was finalized on 04/19/2022 15:27 by Dr. Robin Kam MD.          I have reviewed the patient's current medications.     Assessment/Plan     Active Hospital Problems    Diagnosis    • **Intractable nausea and vomiting    • Severe malnutrition (CMS/HCC)    • Hypophosphatemia    • Hypokalemia    • UTI (urinary tract infection), bacterial    • Malfunction of nephrostomy tube (HCC)    • Lactic acidosis    • Sepsis secondary to UTI (HCC)    • Essential (primary) hypertension      1.  AMS:  Etiology unknown  -LP negative  -Encephalitis panel negative  -Paraneoplastic panel pending  -Neuro following  -on Empiric Keppra  -MRI concerning for ischemic insult but no significant hypoxic events    2.  Thigh hematoma  -AC held  -Surgery following    3.  HTN  -monitor    4.  ESBL UTI  -IV Abx  -ID following    4.  History of bladder rupture  -continue stratton    5.  Presence of Nephrostomy tubes  -monitor  -will need removal at some point, can be done as outpatient    6.  ?Small PE  -CTA on 4/7 was negative  -CT Abdomen and Pelvis on 4/15 shows small filling defect in the right lung.    -Duplex scan is negative for DVT  -not currently able to tolerate AC due to bleeding into thigh  -question if filling defect is related to contrast phase/timing    DVT PPX:  SCD's      Discharge Planning: Transfer arrangements pending.    Electronically signed by Brody Potter MD, 04/20/22, 10:34 CDT.

## 2022-04-20 NOTE — PROGRESS NOTES
INFECTIOUS DISEASES PROGRESS NOTE    Patient:  Namita Zabala  YOB: 1975  MRN: [id number]   Admit date: 4/2/2022   Admitting Physician: Brody Potter MD  Primary Care Physician: David Lara MD    Chief Complaint: Unobtainable from patient as she is  intubated      Interval History: Patient is underwent MRI.  This has been reviewed by Dr. Payne.  Appears consistent with anoxic injury however no anoxic episode is known to have occurred.    Notes reviewed.  Dr. Scott spoke with patient's  at bedside.  Plans are to begin tube feeding slowly today.  No clinical evidence for abscess involving the left groin.    Right IJ central line placed today.  Plan to remove right femoral central line    Allergies:   Allergies   Allergen Reactions   • Coconut Unknown - High Severity   • Nuts Unknown - High Severity   • Penicillins    • [place] Other (See Comments)     Causes migraines per pt reports       Current Scheduled Medications:   ertapenem, 1 g, Intravenous, Q24H  famotidine, 20 mg, Intravenous, Q12H  FLUoxetine, 20 mg, Nasogastric, Nightly  fluticasone, 2 spray, Each Nare, BID  folic acid, 1 mg, Nasogastric, Daily  levETIRAcetam, 500 mg, Intravenous, Q12H  neomycin-polymyxin-hydrocortisone, 4 drop, Both Ears, Q6H  sodium bicarbonate, 650 mg, Nasogastric, BID  sodium chloride, 10 mL, Intravenous, Q12H      Current PRN Medications:  hold  •  hydrALAZINE  •  labetalol  •  Morphine  •  ondansetron **OR** ondansetron  •  potassium chloride **OR** potassium chloride **OR** potassium chloride  •  potassium phosphate infusion greater than 15 mMoles **OR** potassium phosphate infusion greater than 15 mMoles **OR** potassium phosphate **OR** sodium phosphate IVPB **OR** sodium phosphate IVPB **OR** sodium phosphate IVPB  •  sodium chloride  •  [COMPLETED] Insert peripheral IV **AND** sodium chloride  •  [COMPLETED] Insert peripheral IV **AND** sodium chloride  •  sodium  "chloride    Review of Systems   Unable to perform ROS: Intubated       Objective     Vital Signs:  Temp (24hrs), Av °F (36.1 °C), Min:96.5 °F (35.8 °C), Max:97.5 °F (36.4 °C)      /78   Pulse 91   Temp 96.7 °F (35.9 °C) (Axillary)   Resp 16   Ht 170.2 cm (67\")   Wt 72.2 kg (159 lb 2.8 oz)   SpO2 96%   BMI 24.93 kg/m²         Physical Exam   General: Patient is acutely ill-appearing lying in bed in the CCU.    HEENT: ET tube is in place.  OG tube is in place.  Respiratory: Effort even and unlabored.  FiO2 30% PEEP of 5 O2 sats 99- 100%  Neuro: Sedated on propofol  Psych: Sedated on propofol      Line/IV site: CC femoral right, condition patent and no redness.  Central line placed right IJ      Results Review:    I reviewed the patient's new clinical results.    Lab Results:  CBC:   Lab Results   Lab 04/15/22  1150 22  0200 22  1511 22  0732 22  1417 22  0231 22  0329 22  0417   WBC 15.69* 15.70*  --  12.14* 12.54* 11.08* 10.38 8.27   HEMOGLOBIN 7.2* 4.8* 9.0* 6.6* 8.8* 7.9* 8.8* 7.4*   HEMATOCRIT 21.4* 15.8* 26.3* 19.4* 25.1* 23.5* 25.9* 22.4*   PLATELETS 347 301  --  161 162 185 214 218   LYMPHOCYTE %  --   --   --   --   --  4.0*  --   --    MONOCYTES %  --   --   --   --   --  5.1  --   --            CMP:     Lab Results   Lab 22  0231 22  0329 22  1650 22  0417   SODIUM 140 142  --  142   POTASSIUM 3.4* 3.6 4.5 3.7   CHLORIDE 106 108*  --  111*   CO2 25.0 24.0  --  24.0   BUN 7 6  --  5*   CREATININE 0.92 0.91  --  0.71   CALCIUM 7.9* 8.7  --  8.3*   BILIRUBIN 0.5 0.6  --  0.5   ALK PHOS 74 88  --  78   ALT (SGPT) 17 21  --  20   AST (SGOT) 45* 43*  --  46*   GLUCOSE 77 91  --  79       Estimated Creatinine Clearance: 115.2 mL/min (by C-G formula based on SCr of 0.71 mg/dL).    Culture Results:    Microbiology Results (last 10 days)     Procedure Component Value - Date/Time    Culture, CSF - Cerebrospinal Fluid, Lumbar Puncture " [[id number]] Collected: 04/17/22 1239    Lab Status: Final result Specimen: Cerebrospinal Fluid from Lumbar Puncture Updated: 04/20/22 0535     CSF Culture No growth at 3 days     Gram Stain No WBCs or organisms seen    Anaerobic Culture - Cerebrospinal Fluid, Spine, Lumbar [[id number]]  (Normal) Collected: 04/17/22 1239    Lab Status: Preliminary result Specimen: Cerebrospinal Fluid from Spine, Lumbar Updated: 04/20/22 0609     Anaerobic Culture No anaerobes isolated at 3 days    AFB Culture - Cerebrospinal Fluid, Lumbar Puncture [[id number]] Collected: 04/17/22 1239    Lab Status: Preliminary result Specimen: Cerebrospinal Fluid from Lumbar Puncture Updated: 04/18/22 1110     AFB Stain No acid fast bacilli seen on direct smear    Cryptococcal AG, CSF - Cerebrospinal Fluid, Lumbar Puncture [[id number]]  (Normal) Collected: 04/17/22 1239    Lab Status: Final result Specimen: Cerebrospinal Fluid from Lumbar Puncture Updated: 04/17/22 1403     Cryptococcal Antigen, CSF Negative    Meningitis / Encephalitis Panel, PCR - Cerebrospinal Fluid, Lumbar Puncture [[id number]]  (Normal) Collected: 04/17/22 1239    Lab Status: Final result Specimen: Cerebrospinal Fluid from Lumbar Puncture Updated: 04/18/22 1105     ESCHERICHIA COLI K1, PCR Not Detected     HAEMOPHILUS INFLUENZAE, PCR Not Detected     LISTERIA MONOCYTOGENES, PCR Not Detected     NEISSERIA MENINGITIDIS, PCR Not Detected     STREPTOCOCCUS AGALACTIAE, PCR Not Detected     STREPTOCOCCUS PNEUMONIAE, PCR Not Detected     CYTOMEGALOVIRUS (CMV), PCR Not Detected     ENTEROVIRUS, PCR Not Detected     HERPES SIMPLEX VIRUS 1 (HSV-1), PCR Not Detected     HERPES SIMPLEX VIRUS 2 (HSV-2), PCR Not Detected     HUMAN PARECHOVIRUS, PCR Not Detected     VARICELLA ZOSTER VIRUS (VZV), PCR Not Detected     CRYPTOCOCCUS NEOFORMANS / GATTII, PCR Not Detected     HUMAN HERPES VIRUS 6 PCR Not Detected    Narrative:      This test is performed by utilizing real time polymerace chain recation  (PCR).   The [employer] ME Panel does not distinguish between latent and active CMV and HHV-6 infections. Detection of these viruses may indicate primary infection, secondary reactivation, or the presence of latent virus. Results should always be interpreted in conjunction with other clinical, laboratory, and epidemiological information.    Blood Culture - Blood, Arm, Left [[id number]]  (Normal) Collected: 04/16/22 0346    Lab Status: Preliminary result Specimen: Blood from Arm, Left Updated: 04/20/22 0418     Blood Culture No growth at 4 days    Blood Culture - Blood, Arm, Left [[id number]]  (Normal) Collected: 04/16/22 0112    Lab Status: Preliminary result Specimen: Blood from Arm, Left Updated: 04/20/22 0147     Blood Culture No growth at 4 days    Blood Culture With AASHISH - Blood, Arm, Right [[id number]]  (Normal) Collected: 04/14/22 1938    Lab Status: Final result Specimen: Blood from Arm, Right Updated: 04/19/22 2017     Blood Culture No growth at 5 days    Urine Culture - Urine, Urine, Catheter In/Out [[id number]]  (Abnormal)  (Susceptibility) Collected: 04/14/22 1025    Lab Status: Final result Specimen: Urine, Catheter In/Out Updated: 04/17/22 1129     Urine Culture >100,000 CFU/mL Klebsiella oxytoca ESBL     Comment: Consider infectious disease consult.  Susceptibility results may not correlate to clinical outcomes.       Susceptibility      Klebsiella oxytoca ESBL      SANDEEP      Amikacin Susceptible      Ertapenem Susceptible      Gentamicin Resistant     Levofloxacin Intermediate      Meropenem Susceptible      Nitrofurantoin Susceptible      Piperacillin + Tazobactam Susceptible      Tobramycin Intermediate      Trimethoprim + Sulfamethoxazole Susceptible                                      Radiology:   Imaging Results (Last 72 Hours)     Procedure Component Value Units Date/Time    XR Chest 1 View [[id number]] Collected: 04/20/22 1150     Updated: 04/20/22 1155    Narrative:      EXAMINATION: XR CHEST 1 VW-  4/20/2022 11:50 AM CDT     HISTORY: IJ placement; E87.6-Hypokalemia; N39.0-Urinary tract infection,  site not specified; R31.9-Hematuria, unspecified; R11.2-Nausea with  vomiting, unspecified; E87.2-Acidosis; R13.10-Dysphagia, unspecified;  A41.9-Sepsis, unspecified organism; N39.0-Urinary tract infection, site  not specified; Z78.9-Other specified health status.     REPORT: A frontal view of the chest was obtained.     COMPARISON: Chest x-ray 4/20/2022 0337 hours.     The endotracheal tube, nasogastric tube and partially visualized  external left nephrostomy tube appear in satisfactory position as  before. There is a new right internal jugular central line, tip projects  over the cavoatrial junction in good position. No pneumothorax is  identified. There is mild volume loss in the lung bases and mild central  vascular crowding. Heart size is normal. No pleural effusion is  identified.       Impression:      Interval insertion of a right internal jugular central line,  in good position without pneumothorax. No other significant change is  identified.  This report was finalized on 04/20/2022 11:52 by Dr. Florian Laurent MD.    XR Chest 1 View [[id number]] Collected: 04/20/22 0723     Updated: 04/20/22 0727    Narrative:      HISTORY: Intubated     CXR: Frontal view the chest obtained     COMPARISON: 4/19/2022     FINDINGS: The endotracheal tube is slightly low-lying with the tip  position 1.5 cm above the jaime. Enteric tube tip in the fundus of the  stomach.     There are bibasilar densities, patchy atelectasis or possibly pneumonia.  No radiographic evidence of edema. No pleural effusion or pneumothorax.  Stable mediastinal contours.       Impression:      1. Slightly low-lying endotracheal tube. Enteric tube tip in the fundus  of stomach.  2. Bibasilar densities, slightly increased from yesterday's exam  represent patchy atelectasis, however early pneumonia considered.  This report was finalized on 04/20/2022  07:24 by Dr. Rina Ricardo MD.    XR Abdomen KUB [[id number]] Collected: 04/20/22 0714     Updated: 04/20/22 0718    Narrative:      EXAMINATION: KV radiograph 4/20/2022     HISTORY: Ileus.     FINDINGS: Today's exam is compared to previous study of 4 days earlier.  There is improving colonic and small bowel distention consistent with a  resolving ileus. A left-sided nephrostomy tube and ureteral stent are  stable in position from the previous exam. A NG tube remains in place.  There is no evidence of free air.       Impression:      1.. Improving ileus with diminishing small bowel and colonic distention.  This report was finalized on 04/20/2022 07:15 by Dr. Yuniel De Jesus MD.    XR Chest 1 View [[id number]] Collected: 04/20/22 0711     Updated: 04/20/22 0716    Narrative:      EXAMINATION: Chest 1 view 4/19/2022     HISTORY: Tube exchange     FINDINGS: Upright frontal projection of the chest is compared to  previous exam of one day earlier. An endotracheal tube and NG tube  remain in place. There is elevation of the right diaphragm with mild  right basilar atelectasis. Lungs are otherwise clear. There is no  effusion or free air present.       Impression:      1.. No interval line changes.  2. Elevated right diaphragm with right basilar atelectasis.  This report was finalized on 04/20/2022 07:12 by Dr. Yuniel De Jesus MD.    US Venous Doppler Lower Extremity Bilateral (duplex) [[id number]] Collected: 04/19/22 1527     Updated: 04/19/22 1531    Narrative:      History: Swelling       Impression:      Impression: There is no evidence of deep venous thrombosis or  superficial thrombophlebitis of right or left lower extremities.     Comments: Bilateral lower extremity venous duplex exam was performed  using color Doppler flow, Doppler waveform analysis, and grayscale  imaging, with and without compression. There is no evidence of deep  venous thrombosis in the common femoral, superficial femoral,  popliteal,  peroneal, anterior tibial, and posterior tibial veins bilaterally. No  thrombus is identified in the saphenofemoral junctions and greater  saphenous veins bilaterally.         This report was finalized on 04/19/2022 15:27 by Dr. Robin Kam MD.    XR Chest 1 View [[id number]] Collected: 04/19/22 0839     Updated: 04/19/22 0845    Narrative:      EXAMINATION: XR CHEST 1 VW- 4/19/2022 8:39 AM CDT     HISTORY: vent check; E87.6-Hypokalemia; N39.0-Urinary tract infection,  site not specified; R31.9-Hematuria, unspecified; R11.2-Nausea with  vomiting, unspecified; E87.2-Acidosis; R13.10-Dysphagia, unspecified;  A41.9-Sepsis, unspecified organism; N39.0-Urinary tract infection, site  not specified.     REPORT: A frontal view of the chest was obtained.     COMPARISON: Chest x-ray 4/18/2022 1300 hours.     Endotracheal tube projects over the midline trachea with tip  approximately 15 mm superior to the jaime, satisfactory and stable. The  tip of the NG tube projects over the gastric fundus as before. There is  partial visualization of the internal and external left nephrostomy  tube, oriented vertically over the left lower chest and upper abdomen.  There is mild atelectasis in the lung bases greater on the left. No lung  consolidation is identified. There is no pneumothorax or pleural  effusion. No acute osseous findings. Mild levoscoliosis of the thoracic  spine.       Impression:      Stable 1 day appearance of the chest.  This report was finalized on 04/19/2022 08:42 by Dr. Florian Laurent MD.    MRI Brain With & Without Contrast [[id number]] Collected: 04/18/22 1738     Updated: 04/18/22 1752    Narrative:      HISTORY: Unresponsive, improving kidney function     MRI BRAIN: Multiplanar imaging the brain performed pre- and post-IV  contrast.     COMPARISON: Nonenhanced MRI brain 4/16/2022     FINDINGS: There is hyperintense T2 FLAIR signal symmetrically along the  bilateral frontal parietal and occipital  lobes. Increased signal on  diffusion-weighted images most likely T2 shine through artifact there is  no abnormal enhancement identified. Minimal hyperintense FLAIR signal  within the periventricular regions with few hyperintense punctate FLAIR  signal within the subcortical frontal lobes. No abnormal extra-axial  fluid collection.     Limited assessment of the orbits and base of skull unremarkable.       Impression:      1. No abnormal enhancement identified within the region of hyperintense  T2/FLAIR signal involving the bilateral frontal,parietal and occipital  cortical regions. 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 No abnormal enhancement identified.  This report was finalized on 04/18/2022 17:49 by Dr. Rina Ricardo MD.    XR Chest 1 View [[id number]] Collected: 04/18/22 1403     Updated: 04/18/22 1410    Narrative:      EXAMINATION: XR CHEST 1 VW- 4/18/2022 2:03 PM CDT     HISTORY: Intubation; E87.6-Hypokalemia; N39.0-Urinary tract infection,  site not specified; R31.9-Hematuria, unspecified; R11.2-Nausea with  vomiting, unspecified; E87.2-Acidosis; R13.10-Dysphagia, unspecified;  A41.9-Sepsis, unspecified organism; N39.0-Urinary tract infection, site  not specified.     REPORT: A frontal view the chest was obtained.     COMPARISON: Chest x-ray 2/20/2022.     The patient is now intubated, with endotracheal tube in satisfactory  position with tip approximately 1.4 cm superior to the jaime. An NG  tube has been inserted, tip is at the gastric fundus level. There is  partial visualization of the left-sided external/internal percutaneous  nephrostomy nephrostomy catheter, the tip is shown to be over the  midline exterior to the patient on previous CT of the abdomen on  4/15/2022. There is mild atelectasis in the left lung base. No lung  consolidation is identified. Heart size is normal. No pneumothorax or  pleural effusion is identified.       Impression:       Satisfactory position of the endotracheal tube and NG tube.  Mild volume loss in the lung bases, greater on the left. No lung  consolidation is identified. There is no pneumothorax or pleural  effusion.        This report was finalized on 04/18/2022 14:07 by Dr. Florian Laurent MD.    IR LUMBAR PUNCTURE DIAGNOSTIC [[id number]] Collected: 04/17/22 1300     Updated: 04/17/22 1304    Narrative:      EXAM: IR LUMBAR PUNCTURE DIAGNOSTIC- - 4/17/2022 12:06 PM CDT     HISTORY: unresponsive and abnormal mri; E87.6-Hypokalemia; N39.0-Urinary  tract infection, site not specified; R31.9-Hematuria, unspecified;  R11.2-Nausea with vomiting, unspecified; E87.2-Acidosis;  R13.10-Dysphagia, unspecified; A41.9-Sepsis, unspecified organism;  N39.0-Urinary tract infection, site not specified       COMPARISON: None.      TECHNIQUE: Fluoroscopy-guided lumbar puncture for CSF.  Number of images: 2  Fluoroscopy time: 20 seconds  Dose: 13.8 mGy     PROCEDURE:   After discussing risks, benefits and alternatives of the procedure with  the patient's family, written consent was obtained.      A timeout was performed including the patient's name, date of birth and  procedure to be performed.     The patient was prepped and draped in sterile fashion. 1 percent  lidocaine was administered for local anesthesia.     Under fluoroscopic guidance, a 20-gauge needle was advanced into the  thecal sac at the L3-L4 level. 10 mL of clear fluid was removed. The  patient tolerated the procedure well, and there were no immediate  complications.      The CSF was sent for analysis per referring clinician's orders.     Opening pressure: 13.8 cm of water       Impression:      Successful fluoroscopic-guided lumbar puncture as described. 10 mL clear  CSF sent to the laboratory..  This report was finalized on 04/17/2022 13:00 by Dr. Gomez Cárdenas MD.          Assessment/Plan     Active Hospital Problems    Diagnosis    • **Intractable nausea and vomiting    •  "Severe malnutrition (CMS/HCC)    • Hypophosphatemia    • Hypokalemia    • UTI (urinary tract infection), bacterial    • Malfunction of nephrostomy tube (HCC)    • Lactic acidosis    • Sepsis secondary to UTI (HCC)    • Essential (primary) hypertension        IMPRESSION:    1. Acute respiratory on the vent-understanding and she was placed in the vent for airway protection in order to get an MRI.  She is on minimal ventilator settings  2. History of necrotizing urinary tract infection previous admission now with urinary tract infection with ESBL Klebsiella -in and out cath collection per computer.  On ertapenem  3. Leukocytosis- resolved  4. Acute blood loss anemia thought secondary to left thigh hematoma-Per report of CT \"suspicious for abscess in the left abductor katie muscle\"- Dr. Scott following.  No concern for abscess clinically at this time.  Hemoglobin trending down again.  Reviewed with CARLEY Gonzalez.  No change in size of left lower extremity.  5. Abnormal MRI of brain.  6. Blood culture with coagulase-negative staph-contaminant    RECOMMENDATION:   · Plan on 7-day course of IV antibiotics for ESBL E. coli urinary tract infection and discontinue-patient was previously treated for ESBL Klebsiella and Proteus in urine beginning of hospitalization.  We will then plan to monitor patient and reculture if indicated  · Ventilator management per hospitalist  · Hemodynamic management per hospitalist  · Monitor hemoglobin closely especially given recent blood loss.      Discussed with CARLEY Gonzalez MD  04/20/22  12:46 CDT      "

## 2022-04-20 NOTE — CONSULTS
Patient or patient's representative gives informed consent after an explanation of the risks (air embolism; pneumothorax; catheter occlusion; phlebitis; catheter infection; bloodstream infection; vein thrombosis; hematoma/bleeding at the insertion site; slight discomfort; accidental puncture of an artery, nerve, or tendon; dislodging of device), benefits (longer dwell time, outpatient treatment, medications that cannot run peripherally), and alternatives (short peripheral catheter, PICC placement, or permanent catheter) of CVC insertion. Time provided to ask and answer questions.    Pt had 7 [place] triple lumen CVC placed in right jugular vein. Pt tolerated procedure well. 18 cm of catheter internal and 2 cm external. Tip confirmation by xray. All lumens flush and draw well. Sterile dressing applied. Sliding Lung Sign positive. Proctored by Dr. Plummer.

## 2022-04-20 NOTE — PAYOR COMM NOTE
"AUTH: [id number]    Namita Cooper MARCELO (46 y.o. Female)             Date of Birth   1975    Social Security Number       Address   [address] [place] [address]    Home Phone   [phone]    MRN   [id number]       [facility]    Marital Status                               Admission Date   4/2/22    Admission Type   Emergency    Admitting Provider   Brody Potter MD    Attending Provider   Brody Potter MD    Department, Room/Bed   [facility] CARDIAC [facility], C006/1       Discharge Date       Discharge Disposition       Discharge Destination                               Attending Provider: Brody Potter MD    Allergies: Coconut, Nuts, Penicillins, [place]    Isolation: Contact   Infection: COVID (History) (09/15/21), ESBL Klebsiella (04/07/22)   Code Status: CPR   Advance Care Planning Activity    Ht: 170.2 cm (67\")   Wt: 72.2 kg (159 lb 2.8 oz)    Admission Cmt: None   Principal Problem: Intractable nausea and vomiting [R11.2]                 Active Insurance as of 4/2/2022     Primary Coverage     Payor Plan Insurance Group Employer/Plan Group    ANTHEM BLUE CROSS ANTHEM BLUE CROSS BLUE SHIELD PPO [id number]     Payor Plan Address Payor Plan Phone Number Payor Plan Fax Number Effective Dates    [address] [phone]  1/1/2022 - None Entered    [facility] [address]       Subscriber Name Subscriber Birth Date Member ID       GRANT COOPER GEMA 1975 [id number]           Secondary Coverage     Payor Plan Insurance Group Employer/Plan Group    MEDICARE MEDICARE A & B      Payor Plan Address Payor Plan Phone Number Payor Plan Fax Number Effective Dates    [address] [phone]  7/1/2019 - None Entered    [facility] [address]       Subscriber Name Subscriber Birth Date Member ID       NAMITA COOPER N 1975 [id number]                 Emergency Contacts      (Rel.) Home Phone Work Phone Mobile Phone    Grant Cooper " (Spouse) -- -- [phone]    Noel Johnson (Father) [phone] -- [phone]    Marquita Johnson (Mother) -- -- [phone]    Neida Zabala -- -- [phone]            Ventilator/Non-Invasive Ventilation Settings (From admission, onward)             Start     Ordered    04/18/22 1311  Ventilator - AC/VC; (16); 90%; 5; 450  Continuous        Question Answer Comment   Vent Mode AC/VC    Breath rate  16   FiO2 titrate for Sp02% =/> 90%    PEEP 5    Tidal Volume 450        04/18/22 1312                   Physician Progress Notes (last 24 hours)      Taj Payne MD at 04/20/22 0841            Neurology Progress Note      Date of admission: 4/2/2022  8:46 PM  Date of visit: 4/20/2022    Chief Complaint:  F/u encephalopathy    Subjective     Subjective:  No events overnight.  Patient remains intubated and on propofol.  She currently is off pressor agents.  She is pending transfer to [facility].  She has been accepted but is awaiting a bed.  CSF studies remain unremarkable.  Medications:  Current Facility-Administered Medications   Medication Dose Route Frequency Provider Last Rate Last Admin   • ertapenem (INVanz) 1 g in sodium chloride 0.9 % 100 mL IVPB-VTB  1 g Intravenous Q24H Amado Villarreal  mL/hr at 04/19/22 1539 1 g at 04/19/22 1539   • famotidine (PEPCID) injection 20 mg  20 mg Intravenous Q12H Amado Villarreal MD   20 mg at 04/20/22 0803   • FLUoxetine (PROzac) capsule 20 mg  20 mg Oral Nightly Amado Villarreal MD   20 mg at 04/19/22 2021   • fluticasone (FLONASE) 50 MCG/ACT nasal spray 2 spray  2 spray Each Nare BID Frank Ricks MD   2 spray at 04/20/22 0803   • folic acid (FOLVITE) tablet 1 mg  1 mg Oral Daily Aamdo Villarreal MD   1 mg at 04/19/22 2021   • Hold medication  1 each Does not apply Continuous PRN Abiola Yan MD       • hydrALAZINE (APRESOLINE) injection 10 mg  10 mg Intravenous Q4H PRN Amado Villarreal MD       • labetalol (NORMODYNE,TRANDATE)  injection 10 mg  10 mg Intravenous Q4H PRN Amado Villarreal MD   10 mg at 04/10/22 1518   • lactated ringers infusion  75 mL/hr Intravenous Continuous Amado Villarreal MD 75 mL/hr at 04/19/22 0445 75 mL/hr at 04/19/22 0445   • levETIRAcetam in NaCl 0.82% (KEPPRA) IVPB 500 mg  500 mg Intravenous Q12H Abiola Yan MD   500 mg at 04/20/22 0803   • Morphine sulfate (PF) injection 2 mg  2 mg Intravenous Q4H PRN Amado Villarreal MD       • neomycin-polymyxin-hydrocortisone (CORTISPORIN) otic suspension 4 drop  4 drop Both Ears Q6H Fermin Mcclure DO   4 drop at 04/20/22 0612   • norepinephrine (LEVOPHED) 8 mg in 250 mL NS infusion (premix)  0.02-0.3 mcg/kg/min Intravenous Titrated Brody Potter MD   Stopped at 04/18/22 1715   • ondansetron (ZOFRAN) tablet 4 mg  4 mg Oral Q6H PRN Frank Ricks MD        Or   • ondansetron (ZOFRAN) injection 4 mg  4 mg Intravenous Q6H PRN Frank Ricks MD   4 mg at 04/16/22 1000   • potassium chloride (MICRO-K) CR capsule 40 mEq  40 mEq Oral PRN Amado Villarreal MD        Or   • potassium chloride (KLOR-CON) packet 40 mEq  40 mEq Oral PRN Amado Villarreal MD        Or   • potassium chloride 10 mEq in 100 mL IVPB  10 mEq Intravenous Q1H PRN Amado Villarreal  mL/hr at 04/19/22 1046 10 mEq at 04/19/22 1046   • potassium phosphate 45 mmol in sodium chloride 0.9 % 500 mL infusion  45 mmol Intravenous PRN Amado Villarreal MD        Or   • potassium phosphate 30 mmol in sodium chloride 0.9 % 250 mL infusion  30 mmol Intravenous PRN Amado Villarreal MD 31.3 mL/hr at 04/16/22 1900 30 mmol at 04/16/22 1900    Or   • Potassium Phosphates 15 mmol in sodium chloride 0.9 % 100 mL infusion  15 mmol Intravenous PRN Amado Villarreal MD        Or   • sodium phosphates 45 mmol in sodium chloride 0.9 % 500 mL IVPB  45 mmol Intravenous PRN Amado Villarreal MD        Or   • sodium phosphates 30 mmol in sodium chloride 0.9 % 250 mL IVPB  30 mmol Intravenous PRN Amado Villarreal MD         Or   • sodium phosphates 15 mmol in sodium chloride 0.9 % 250 mL IVPB  15 mmol Intravenous PRN Amado Villarreal MD       • propofol (DIPRIVAN) infusion 10 mg/mL 100 mL  5-50 mcg/kg/min Intravenous Titrated Brody Potter MD 8.65 mL/hr at 04/20/22 0450 20 mcg/kg/min at 04/20/22 0450   • sodium bicarbonate tablet 650 mg  650 mg Oral BID Amado Villarreal MD   650 mg at 04/20/22 0803   • sodium chloride 0.9 % flush 10 mL  10 mL Intravenous PRN Frank Ricks MD       • sodium chloride 0.9 % flush 10 mL  10 mL Intravenous PRN Frank Ricks MD       • sodium chloride 0.9 % flush 10 mL  10 mL Intravenous PRN Frank Ricks MD       • sodium chloride 0.9 % flush 10 mL  10 mL Intravenous Q12H Frank Ricks MD   10 mL at 04/20/22 0803   • sodium chloride 0.9 % flush 10 mL  10 mL Intravenous PRN Frank Ricks MD   10 mL at 04/10/22 1518       Review of Systems:   -A 14 point review of systems is unobtainable as she is unresponsive  Objective     Objective      Vital Signs  Temp:  [96.5 °F (35.8 °C)-97.5 °F (36.4 °C)] 96.5 °F (35.8 °C)  Heart Rate:  [] 89  Resp:  [15-19] 15  BP: ()/(53-93) 126/91  FiO2 (%):  [30 %] 30 %    Physical Exam:    HEENT:  Neck supple  CVS:  Regular rate and rhythm.  No murmurs  Carotid Examination:  No bruits  Lungs:  Clear to auscultation  Abdomen:  Non-tender, Non-distended  Extremities:  No signs of peripheral edema    Neurologic Exam:  Propofol held 25 minutes before examining    -Unresponsive and intubated  May open eyes to stimulation  -Does not follows any commands    Cranial nerves II through XII intact.  Pupils react  No response to visual threat  No doll eyes  Grimaces  And frontalis symmetric  Minimal movement lower face bilateral  Seems to hear  Breathes above vent and coughs when suctioned    Motor: (strength out of 5:  1= minimal movement, 2 = movement in plane of gravity, 3 = movement against gravity, 4 = movement against some resistance, 5 =  full strength)    Withdraws in all extremities.  Some increased tone noted throughout.    DTR:  3+ throughout in upper extremities  Absent in lower extremities    Sensory:  Only responds to noxious stimuli    Coordination/Gait:  -No spontaneous movement to assess     Results Review:    I reviewed the patient's new clinical results.    Lab Results (last 24 hours)     Procedure Component Value Units Date/Time    Anaerobic Culture - Cerebrospinal Fluid, Spine, Lumbar [[id number]]  (Normal) Collected: 04/17/22 1239    Specimen: Cerebrospinal Fluid from Spine, Lumbar Updated: 04/20/22 0609     Anaerobic Culture No anaerobes isolated at 3 days    Culture, CSF - Cerebrospinal Fluid, Lumbar Puncture [[id number]] Collected: 04/17/22 1239    Specimen: Cerebrospinal Fluid from Lumbar Puncture Updated: 04/20/22 0535     CSF Culture No growth at 3 days     Gram Stain No WBCs or organisms seen    Comprehensive Metabolic Panel [[id number]]  (Abnormal) Collected: 04/20/22 0417    Specimen: Blood Updated: 04/20/22 0446     Glucose 79 mg/dL      BUN 5 mg/dL      Creatinine 0.71 mg/dL      Sodium 142 mmol/L      Potassium 3.7 mmol/L      Chloride 111 mmol/L      CO2 24.0 mmol/L      Calcium 8.3 mg/dL      Total Protein 4.1 g/dL      Albumin 1.70 g/dL      ALT (SGPT) 20 U/L      AST (SGOT) 46 U/L      Alkaline Phosphatase 78 U/L      Total Bilirubin 0.5 mg/dL      Globulin 2.4 gm/dL      A/G Ratio 0.7 g/dL      BUN/Creatinine Ratio 7.0     Anion Gap 7.0 mmol/L      eGFR 107.7 mL/min/1.73      Comment: National Kidney Foundation and American Society of Nephrology (ASN) Task Force recommended calculation based on the Chronic Kidney Disease Epidemiology Collaboration (CKD-EPI) equation refit without adjustment for race.       Narrative:      GFR Normal >60  Chronic Kidney Disease <60  Kidney Failure <15      CBC & Differential [[id number]]  (Abnormal) Collected: 04/20/22 0417    Specimen: Blood Updated: 04/20/22 0427    Narrative:      The  following orders were created for panel order CBC & Differential.  Procedure                               Abnormality         Status                     ---------                               -----------         ------                     CBC Auto Differential[[id number]]        Abnormal            Final result                 Please view results for these tests on the individual orders.    CBC Auto Differential [[id number]]  (Abnormal) Collected: 04/20/22 0417    Specimen: Blood Updated: 04/20/22 0427     WBC 8.27 10*3/mm3      RBC 2.51 10*6/mm3      Hemoglobin 7.4 g/dL      Hematocrit 22.4 %      MCV 89.2 fL      MCH 29.5 pg      MCHC 33.0 g/dL      RDW 15.9 %      RDW-SD 46.8 fl      MPV 9.7 fL      Platelets 218 10*3/mm3      Neutrophil % 64.2 %      Lymphocyte % 16.1 %      Monocyte % 8.7 %      Eosinophil % 5.6 %      Basophil % 0.7 %      Immature Grans % 4.7 %      Neutrophils, Absolute 5.31 10*3/mm3      Lymphocytes, Absolute 1.33 10*3/mm3      Monocytes, Absolute 0.72 10*3/mm3      Eosinophils, Absolute 0.46 10*3/mm3      Basophils, Absolute 0.06 10*3/mm3      Immature Grans, Absolute 0.39 10*3/mm3      nRBC 0.0 /100 WBC     Blood Culture - Blood, Arm, Left [[id number]]  (Normal) Collected: 04/16/22 0346    Specimen: Blood from Arm, Left Updated: 04/20/22 0418     Blood Culture No growth at 4 days    Blood Culture - Blood, Arm, Left [[id number]]  (Normal) Collected: 04/16/22 0112    Specimen: Blood from Arm, Left Updated: 04/20/22 0147     Blood Culture No growth at 4 days    Blood Culture With AASHISH - Blood, Arm, Right [[id number]]  (Normal) Collected: 04/14/22 1938    Specimen: Blood from Arm, Right Updated: 04/19/22 2017     Blood Culture No growth at 5 days    Potassium [[id number]]  (Normal) Collected: 04/19/22 1650    Specimen: Blood Updated: 04/19/22 1758     Potassium 4.5 mmol/L      Comment: Slight hemolysis detected by analyzer. Results may be affected.       Non-gynecologic Cytology [[id number]]  Collected: 22 1239    Specimen: Cerebrospinal Fluid from Lumbar Puncture Updated: 22 0920     Case Report --     Non-gynecologic Cytology                          Case: [id number]                                  Authorizing Provider:  Abiola Yan MD Collected:           2022 12:39 PM          Ordering Location:     [facility]     Received:            2022 08:48 AM                                 CARDIAC CARE                                                                 Pathologist:           Soren Dugan MD                                                        Specimen:    Lumbar Puncture, CSF                                                                        Final Diagnosis --     Cerebrospinal fluid, lumbar puncture:  Negative for malignant cells.  Virtually acellular fluid.       Gross Description --     1. Lumbar Puncture.  Received fresh in the laboratory in a container labeled with patient name and  designated as CSF.  2 mls of clear colorless fluid.  2 cytospin slides prepared.         Microscopic Description --     Performed.          Imaging Results (Last 24 Hours)     Procedure Component Value Units Date/Time    XR Chest 1 View [[id number]] Collected: 22 07     Updated: 22    Narrative:      HISTORY: Intubated     CXR: Frontal view the chest obtained     COMPARISON: 2022     FINDINGS: The endotracheal tube is slightly low-lying with the tip  position 1.5 cm above the jaime. Enteric tube tip in the fundus of the  stomach.     There are bibasilar densities, patchy atelectasis or possibly pneumonia.  No radiographic evidence of edema. No pleural effusion or pneumothorax.  Stable mediastinal contours.       Impression:      1. Slightly low-lying endotracheal tube. Enteric tube tip in the fundus  of stomach.  2. Bibasilar densities, slightly increased from yesterday's exam  represent patchy atelectasis, however early  pneumonia considered.  This report was finalized on 04/20/2022 07:24 by Dr. Rina Ricardo MD.    XR Abdomen KUB [[id number]] Collected: 04/20/22 0714     Updated: 04/20/22 0718    Narrative:      EXAMINATION: KV radiograph 4/20/2022     HISTORY: Ileus.     FINDINGS: Today's exam is compared to previous study of 4 days earlier.  There is improving colonic and small bowel distention consistent with a  resolving ileus. A left-sided nephrostomy tube and ureteral stent are  stable in position from the previous exam. A NG tube remains in place.  There is no evidence of free air.       Impression:      1.. Improving ileus with diminishing small bowel and colonic distention.  This report was finalized on 04/20/2022 07:15 by Dr. Yuniel De Jesus MD.    XR Chest 1 View [[id number]] Collected: 04/20/22 0711     Updated: 04/20/22 0716    Narrative:      EXAMINATION: Chest 1 view 4/19/2022     HISTORY: Tube exchange     FINDINGS: Upright frontal projection of the chest is compared to  previous exam of one day earlier. An endotracheal tube and NG tube  remain in place. There is elevation of the right diaphragm with mild  right basilar atelectasis. Lungs are otherwise clear. There is no  effusion or free air present.       Impression:      1.. No interval line changes.  2. Elevated right diaphragm with right basilar atelectasis.  This report was finalized on 04/20/2022 07:12 by Dr. Yuniel De Jesus MD.    US Venous Doppler Lower Extremity Bilateral (duplex) [[id number]] Collected: 04/19/22 1527     Updated: 04/19/22 1531    Narrative:      History: Swelling       Impression:      Impression: There is no evidence of deep venous thrombosis or  superficial thrombophlebitis of right or left lower extremities.     Comments: Bilateral lower extremity venous duplex exam was performed  using color Doppler flow, Doppler waveform analysis, and grayscale  imaging, with and without compression. There is no evidence of deep  venous thrombosis  in the common femoral, superficial femoral, popliteal,  peroneal, anterior tibial, and posterior tibial veins bilaterally. No  thrombus is identified in the saphenofemoral junctions and greater  saphenous veins bilaterally.         This report was finalized on 04/19/2022 15:27 by Dr. Robin Kam MD.    XR Chest 1 View [[id number]] Collected: 04/19/22 0839     Updated: 04/19/22 0845    Narrative:      EXAMINATION: XR CHEST 1 VW- 4/19/2022 8:39 AM CDT     HISTORY: vent check; E87.6-Hypokalemia; N39.0-Urinary tract infection,  site not specified; R31.9-Hematuria, unspecified; R11.2-Nausea with  vomiting, unspecified; E87.2-Acidosis; R13.10-Dysphagia, unspecified;  A41.9-Sepsis, unspecified organism; N39.0-Urinary tract infection, site  not specified.     REPORT: A frontal view of the chest was obtained.     COMPARISON: Chest x-ray 4/18/2022 1300 hours.     Endotracheal tube projects over the midline trachea with tip  approximately 15 mm superior to the jaime, satisfactory and stable. The  tip of the NG tube projects over the gastric fundus as before. There is  partial visualization of the internal and external left nephrostomy  tube, oriented vertically over the left lower chest and upper abdomen.  There is mild atelectasis in the lung bases greater on the left. No lung  consolidation is identified. There is no pneumothorax or pleural  effusion. No acute osseous findings. Mild levoscoliosis of the thoracic  spine.       Impression:      Stable 1 day appearance of the chest.  This report was finalized on 04/19/2022 08:42 by Dr. Florian Laurent MD.          CSF:  Meningitis/encephalitis panel neg  -Red 4, Whites 1  -Protein:  34.5  -Glucose: 65  -Paraneoplastic pending    EEG:  slowing    Assessment/Plan     Hospital Problem List      Intractable nausea and vomiting    Sepsis secondary to UTI (HCC)    Lactic acidosis    Hypokalemia    Essential (primary) hypertension    UTI (urinary tract infection), bacterial     Malfunction of nephrostomy tube (HCC)    Severe malnutrition (CMS/HCC)    Hypophosphatemia    Impression:  · Altered mentation  · Hypotension requiring pressors  · Currently off of pressors  · Anemia  · Urinary Tract infection  · Failure to thrive  · S/p COVID  Plan:  · CSF studies unremarkable thus far  · MRI consistent with diffuse anoxic injury despite no definitive event.    · She has been accepted at  Neurology once a bed is available        Taj Payne MD  04/20/22  08:41 CDT        Electronically signed by Taj Payne MD at 04/20/22 0906     Tiffanie Scott MD at 04/20/22 0835        No significant change in patient's status.   at bedside.  As I explained to him I feel like the area in her left groin most likely represents a spontaneous bleed/hematoma.  Its not showing any evidence on clinical exam of abscess.  It has been incompletely imaged so we have discussed with the primary team in the past regarding full imaging of this area.  Her neurologic status has not changed significantly.  There is still a goal of getting her to .  Her abdominal x-ray this morning shows less small bowel distention.  Feel is reasonable to begin tube feeds via the NG tube slowly.    Electronically signed by Tiffanie Scott MD at 04/20/22 0837     Tiffanie Scott MD at 04/19/22 1644        Patient still intubated and unresponsive.  She is passing gas and stool through the fecal management system.  NG tube still in place.  Abdomen is softer nondistended.  Her left leg has no evidence of infection it feels softer there is some dependent edema but I continue to feel that this does not represent an abscess but more likely some spontaneous bleeding in that leg that may be resolving.  Patient apparently is on the wait list for transfer to .  Will order abdominal x-ray for morning rounds if she is still here to check her ileus    Electronically signed by Tiffanie Scott MD at 04/19/22 6776     Mick  "Nneka GAYTAN APRN at 04/19/22 1340          Palliative Care Daily Progress Note   Chief complaint: goals of care/advanced care planning  Code Status and Medical Interventions:   Ordered at: 04/03/22 0411     Level Of Support Discussed With:    Patient     Code Status (Patient has no pulse and is not breathing):    CPR (Attempt to Resuscitate)     Medical Interventions (Patient has pulse or is breathing):    Full Support     Subjective   Medical record reviewed. Events noted. She was intubated yesterday afternoon in order for MRI to be completed as she was unable to lie flat and clear her secretions. MRI revealed hyperintense FLAIR signal involving the bilateral frontal, parietal and occipital  cortical regions most favorable for posterior reversible encephalopathy syndrome. Venous doppler US completed of BLE today although results pending. CSF cytology has resulted and is negative for malignant cells and found to be virtually acellular fluid. Final cultures of CSF fluid pending. Appears she was restarted on levophed briefly yesterday around 1 PM but stopped around 5 PM. Remains intubated and sedated with propofol. Does not appear in any distress, no visitors at bedside. Awaiting possible discharge to tertiary care center.    Advance Care Planning   Advance Care Planning Discussion: Spoke to patient's spouse, Grant, yesterday. Attempted to call today however no answer and voicemail is now full again. Call placed to patient's mother, Marquita, to determine if she or patient's spouse had any questions and explored goals of care. Marquita expressed how hard it has been since her daughter has been sick since last fall but especially this hospitalization as she is unsure of what has caused this decline. She also reflected how \"crazy\" this hospitalization has been as she was talking at the beginning and now unable to speak. Marquita stated, \"We're praying for her. She's our only child, I'm keeping my kelsey, but it's really hard.\" She " "asked if there were any new results since she and her  visited last night and since she has spoken with patient's spouse, Grant, explained final results of several tests were pending. Marquita stated, \"I know it sounds bad and I don't want them to find something but at least if they do find something maybe you can know the cause and it can be cured.\" Reports she is hopeful she will be accepted to transfer to [place] as this is where she had been previously and that [place] is so far away and expressed worries of her or her family being unable to visit with her. She stated, \"We want her to be healed but knowing she is all by herself is heartbreaking as well.\" Explained providers were still working on transfer. Marquita asked if there was a \"method to grading how sick she is is or see how bad it is\" referring to her daughter as she reflected on other diseases that are staged. She states she is at the last stage of fibromyalgia. Shared that her daughter was critically ill at this time however there is difficulty in pinpointing an exact cause to illness. Marquita expressed appreciation to care being provided to her daughter and stated, \"We are praying the Lord is going to heal her. I can't accept anything else right now.\" Spouse, Grant, returned phone call after speaking to patient's mother. He shared he was able to visit yesterday morning before work. Reports he has continuously been praying that she will improve and someday get back home. Reflected on how hard the last several months have been and stated, \"I am just lost for words.\" Support provided. Grant stated, \"She fought through this once and got through it, I know this time is different but I am hopeful she can. I just worry about any brain damage she might have and how it might effect her.\" He remains hopeful and looks forward for any updates.    Advance Directive Status: Patient does not have advance directive   Goals of care: Ongoing.    The patient receives " support from her spouse and parent.  POA/Healthcare Surrogate - Next of kin is her spouse, Grant.    Review of Systems   Unable to perform ROS: intubated     Pain Assessment  Nonverbal Indicators of Pain: grimace  CPOT and PAINAD Scales: PAINAD (Pain Assessment in Advance Dementia Scale)  CPOT Facial Expression: 0-->relaxed, neutral  CPOT Body Movements: 0-->absence of movements  CPOT Muscle Tension: 0-->relaxed  Ventilator Compliance/Vocalization: 0-->tolerating ventilator or movement  CPOT Score: 0  PAINAD Breathin-->normal  PAINAD Negative Vocalization: 0-->none  PAINAD Facial Expression: 0-->smiling or inexpressive  PAINAD Body Language: 0-->relaxed  PAINAD Consolability: 0-->no need to console  PAINAD Score: 0  Pain Location: abdomen    Objective   Diagnostics: Reviewed      Intake/Output Summary (Last 24 hours) at 2022 1340  Last data filed at 2022 1212  Gross per 24 hour   Intake 2307.57 ml   Output 1550 ml   Net 757.57 ml     Current Facility-Administered Medications   Medication Dose Route Frequency Provider Last Rate Last Admin   • ertapenem (INVanz) 1 g in sodium chloride 0.9 % 100 mL IVPB-VTB  1 g Intravenous Q24H Amado Villarreal  mL/hr at 22 1714 1 g at 22 1714   • famotidine (PEPCID) injection 20 mg  20 mg Intravenous Q12H Amado Villarreal MD   20 mg at 22 0936   • FLUoxetine (PROzac) capsule 20 mg  20 mg Oral Nightly Amado Villarreal MD   20 mg at 22   • fluticasone (FLONASE) 50 MCG/ACT nasal spray 2 spray  2 spray Each Nare BID Frank Ricks MD   2 spray at 22 0855   • folic acid (FOLVITE) tablet 1 mg  1 mg Oral Daily Amado Villarreal MD   1 mg at 22   • Hold medication  1 each Does not apply Continuous PRN bAiola Yan MD       • hydrALAZINE (APRESOLINE) injection 10 mg  10 mg Intravenous Q4H PRN Amado Villarreal MD       • labetalol (NORMODYNE,TRANDATE) injection 10 mg  10 mg Intravenous Q4H PRN Amado Villarreal MD   10 mg  at 04/10/22 1518   • lactated ringers infusion  75 mL/hr Intravenous Continuous Amado Villarreal MD 75 mL/hr at 04/19/22 0445 75 mL/hr at 04/19/22 0445   • levETIRAcetam in NaCl 0.82% (KEPPRA) IVPB 500 mg  500 mg Intravenous Q12H Abiola Yan MD   500 mg at 04/19/22 0855   • Morphine sulfate (PF) injection 2 mg  2 mg Intravenous Q4H PRN Amado Villarreal MD       • neomycin-polymyxin-hydrocortisone (CORTISPORIN) otic suspension 4 drop  4 drop Both Ears Q6H Fermin Mcclure DO   4 drop at 04/19/22 1206   • norepinephrine (LEVOPHED) 8 mg in 250 mL NS infusion (premix)  0.02-0.3 mcg/kg/min Intravenous Titrated Brody Potter MD   Stopped at 04/18/22 1715   • ondansetron (ZOFRAN) tablet 4 mg  4 mg Oral Q6H PRN Frank Ricks MD        Or   • ondansetron (ZOFRAN) injection 4 mg  4 mg Intravenous Q6H PRN Frank Ricks MD   4 mg at 04/16/22 1000   • potassium chloride (MICRO-K) CR capsule 40 mEq  40 mEq Oral PRN Amado Villarreal MD        Or   • potassium chloride (KLOR-CON) packet 40 mEq  40 mEq Oral PRN Amado Villarreal MD        Or   • potassium chloride 10 mEq in 100 mL IVPB  10 mEq Intravenous Q1H PRN Amado Villarreal  mL/hr at 04/19/22 1046 10 mEq at 04/19/22 1046   • potassium phosphate 45 mmol in sodium chloride 0.9 % 500 mL infusion  45 mmol Intravenous PRN Amado Villarreal MD        Or   • potassium phosphate 30 mmol in sodium chloride 0.9 % 250 mL infusion  30 mmol Intravenous PRN Amado Villarreal MD 31.3 mL/hr at 04/16/22 1900 30 mmol at 04/16/22 1900    Or   • Potassium Phosphates 15 mmol in sodium chloride 0.9 % 100 mL infusion  15 mmol Intravenous PRN Amado Villarreal MD        Or   • sodium phosphates 45 mmol in sodium chloride 0.9 % 500 mL IVPB  45 mmol Intravenous PRN Amado Villarreal MD        Or   • sodium phosphates 30 mmol in sodium chloride 0.9 % 250 mL IVPB  30 mmol Intravenous PRN Amado Villarreal MD        Or   • sodium phosphates 15 mmol in sodium chloride 0.9 % 250 mL IVPB  " 15 mmol Intravenous PRN Amado Villarreal MD       • propofol (DIPRIVAN) infusion 10 mg/mL 100 mL  5-50 mcg/kg/min Intravenous Titrated Brody Potter MD 8.65 mL/hr at 04/19/22 1045 20 mcg/kg/min at 04/19/22 1045   • sodium bicarbonate tablet 650 mg  650 mg Oral BID Amado Villarreal MD   650 mg at 04/19/22 0855   • sodium chloride 0.9 % flush 10 mL  10 mL Intravenous PRN Frank Ricks MD       • sodium chloride 0.9 % flush 10 mL  10 mL Intravenous PRN Frank Ricks MD       • sodium chloride 0.9 % flush 10 mL  10 mL Intravenous PRN Frank Ricks MD       • sodium chloride 0.9 % flush 10 mL  10 mL Intravenous Q12H Frank Ricks MD   10 mL at 04/19/22 0856   • sodium chloride 0.9 % flush 10 mL  10 mL Intravenous PRN Frank Ricks MD   10 mL at 04/10/22 1518     hold, 1 each  lactated ringers, 75 mL/hr, Last Rate: 75 mL/hr (04/19/22 0445)  norepinephrine, 0.02-0.3 mcg/kg/min, Last Rate: Stopped (04/18/22 1715)  propofol, 5-50 mcg/kg/min, Last Rate: 20 mcg/kg/min (04/19/22 1045)      hold  •  hydrALAZINE  •  labetalol  •  Morphine  •  ondansetron **OR** ondansetron  •  potassium chloride **OR** potassium chloride **OR** potassium chloride  •  potassium phosphate infusion greater than 15 mMoles **OR** potassium phosphate infusion greater than 15 mMoles **OR** potassium phosphate **OR** sodium phosphate IVPB **OR** sodium phosphate IVPB **OR** sodium phosphate IVPB  •  sodium chloride  •  [COMPLETED] Insert peripheral IV **AND** sodium chloride  •  [COMPLETED] Insert peripheral IV **AND** sodium chloride  •  sodium chloride    Assessment:  Vital Signs: /82   Pulse 96   Temp 97.5 °F (36.4 °C) (Axillary)   Resp 18   Ht 170.2 cm (67\")   Wt 72.5 kg (159 lb 13.3 oz)   SpO2 100%   BMI 25.03 kg/m²     Physical Exam  Vitals and nursing note reviewed.   Constitutional:       General: She is not in acute distress.     Appearance: She is ill-appearing.      Interventions: She is sedated, " intubated and restrained.   HENT:      Head: Normocephalic and atraumatic.      Nose:      Comments: NG right nostril     Mouth/Throat:      Comments: ETT  Eyes:      General: Lids are normal.   Neck:      Vascular: No JVD.   Cardiovascular:      Rate and Rhythm: Tachycardia present.      Heart sounds: Normal heart sounds.   Pulmonary:      Effort: She is intubated.      Breath sounds: Decreased breath sounds present.   Abdominal:      General: Bowel sounds are decreased. There is no distension.      Palpations: Abdomen is soft.      Comments: Fecal management tube   Genitourinary:     Comments: Mojica catheter and left sided nephrostomy tube.  Musculoskeletal:      Right hand: Swelling present.      Cervical back: Neck supple.      Right foot: Foot drop present.      Left foot: Foot drop present.   Skin:     General: Skin is warm and dry.      Coloration: Skin is pale.      Findings: Signs of injury (documented pressure injury) present.   Neurological:      Motor: Weakness and abnormal muscle tone present.      Comments: CARLI, intubated and sedated.   Psychiatric:      Comments: CARLI, intubated and sedated.     Patient status: Disease state: Deteriorating despite treatments.  Functional status: Palliative Performance Scale Score: Performance 10% based on the following measures: Ambulation: Totally bed bound, Activity and Evidence of Disease: Unable to do any work, extensive evidence of disease, Self-Care: Total care required,  Intake: Mouth care only, LOC: Drowsy or comatose. Nutritional status: Albumin 2.20.Body mass index is 25.03 kg/m².     Impression/Problem List:  1.    Altered mental status / concerns for PRES per MRI of brain  2.    Impaired mobility and ability to perform activities of daily living  3.    Severe malnutrition  4.    Urinary tract infection  5.    Sepsis secondary to UTI  6.    Hypotension   7.    Abnormal EEG  8.    Pulmonary embolism  9.    Anemia  10.  Ileus  11.  Hypocalcemia  12.  Hepatic  "steatosis  13.  Malfunctioning nephrostomy tube  14.  Folate deficiency  15.  Anxiety  16.  Lactic acidosis  17.  Dysphagia    18.  Intractable nausea and vomiting, improved  19.  History of COVID-19 (August 2021)  20.  Presence of NG  21.  Elevated AST  22.  Impaired functioning of gallbladder (sludge-filled and 20% EF per imaging)    Plans/Recommendations     1. Goals of care include CODE STATUS CPR with full support interventions.    2. Palliative care encounter  - Prognosis is guarded secondary to altered mental status with abnormal MRI and EEG, impaired mobility and ability to perform activities of daily living, severe malnutrition, urinary tract infection, sepsis, hypotension, malfunctioning nephrostomy tube, anemia, ileus, dysphagia, hypokalemia, elevated AST, hepatic steatosis, suspicion for abscess of left abductor major and other comorbidities listed above.  - Spoke to patient's mother, Marquita, who shared how hard it has been watching her daughter be ill. Reports, \"We are praying the Lord is going to heal her. I can't accept anything else right now.\"  - Discussed with patient's spouse, Grant, and he also reflected on how hard it has been watching his wife go through illness. Grant stated, \"She fought through this once and got through it, I know this time is different but I am hopeful she can. I just worry about any brain damage she might have and how it might effect her.\"  - Ongoing workup. Continued efforts by attending and neurology for transfer to tertiary care center.   - Will plan to follow up tomorrow or sooner if needed.      Thank you for allowing us to participate in patient's plan of care. Palliative Care Team will continue to follow patient.     Time spent: 30 minutes spent reviewing medical and medication records, assessing and examining patient, discussing with family, answering questions, providing some guidance about a plan and documentation of care, and coordinating care with other healthcare " members, with > 50% time spent face to face.       BERT White  4/19/2022      Electronically signed by Nneka Barton APRN at 04/19/22 8507

## 2022-04-20 NOTE — PROGRESS NOTES
Palliative Care Daily Progress Note   Chief complaint: goals of care/advanced care planning  Code Status and Medical Interventions:   Ordered at: 22 0411     Level Of Support Discussed With:    Patient     Code Status (Patient has no pulse and is not breathing):    CPR (Attempt to Resuscitate)     Medical Interventions (Patient has pulse or is breathing):    Full Support     Subjective   Medical record reviewed. Events noted. Neurology has reviewed MRI and report it is consistent with diffuse anoxic injury despite any definitive event. Apparently has been accepted at  however awaiting bed opening. She remains anemic. KUB completed this morning revealed improvement in ileus, general surgery has evaluated and approved initiation of slow tube feeds. Remains intubated and sedated, no visitors at bedside.     Advance Care Planning   Advance Care Planning Discussion: Call placed to patient's spouse, Grant. He reports he was able to visit her this morning. He shared he was able to discuss with Dr. Jose. States he is still anxiously awaiting transfer to . Reports he remains hopeful and continues to pray for improvements. He plans to visit again in the morning unless something changes. Appreciative of call and questions answered to his satisfaction.     Advance Directive Status: Patient does not have advance directive   Goals of care: Ongoing.    The patient receives support from her spouse and parent.  POA/Healthcare Surrogate - Next of kin is her spouse, Grant.    Review of Systems   Unable to perform ROS: intubated     Pain Assessment  Nonverbal Indicators of Pain: grimace  CPOT and PAINAD Scales: PAINAD (Pain Assessment in Advance Dementia Scale)  CPOT Facial Expression: 0-->relaxed, neutral  CPOT Body Movements: 0-->absence of movements  CPOT Muscle Tension: 0-->relaxed  Ventilator Compliance/Vocalization: 0-->tolerating ventilator or movement  CPOT Score: 0  PAINAD Breathin-->normal  PAINAD Negative  Vocalization: 0-->none  PAINAD Facial Expression: 0-->smiling or inexpressive  PAINAD Body Language: 0-->relaxed  PAINAD Consolability: 0-->no need to console  PAINAD Score: 0  Pain Location: abdomen    Objective   Diagnostics: Reviewed      Intake/Output Summary (Last 24 hours) at 4/20/2022 1626  Last data filed at 4/20/2022 1529  Gross per 24 hour   Intake 2279.93 ml   Output 2175 ml   Net 104.93 ml     Current Facility-Administered Medications   Medication Dose Route Frequency Provider Last Rate Last Admin   • famotidine (PEPCID) injection 20 mg  20 mg Intravenous Q12H Amado Villarreal MD   20 mg at 04/20/22 0803   • FLUoxetine (PROzac) capsule 20 mg  20 mg Nasogastric Nightly Brody Potter MD       • fluticasone (FLONASE) 50 MCG/ACT nasal spray 2 spray  2 spray Each Nare BID Frank Ricks MD   2 spray at 04/20/22 0803   • folic acid (FOLVITE) tablet 1 mg  1 mg Nasogastric Daily Brody Potter MD       • Hold medication  1 each Does not apply Continuous PRN Abiola Yan MD       • hydrALAZINE (APRESOLINE) injection 10 mg  10 mg Intravenous Q4H PRN Amado Villarreal MD       • labetalol (NORMODYNE,TRANDATE) injection 10 mg  10 mg Intravenous Q4H PRN Amado Villarreal MD   10 mg at 04/10/22 1518   • lactated ringers infusion  75 mL/hr Intravenous Continuous Amado Villarreal MD 75 mL/hr at 04/20/22 1159 75 mL/hr at 04/20/22 1159   • levETIRAcetam in NaCl 0.82% (KEPPRA) IVPB 500 mg  500 mg Intravenous Q12H Abiola Yan MD   500 mg at 04/20/22 0803   • Morphine sulfate (PF) injection 2 mg  2 mg Intravenous Q4H PRN Amado Villarreal MD       • neomycin-polymyxin-hydrocortisone (CORTISPORIN) otic suspension 4 drop  4 drop Both Ears Q6H Fermin Mcclure DO   4 drop at 04/20/22 1203   • norepinephrine (LEVOPHED) 8 mg in 250 mL NS infusion (premix)  0.02-0.3 mcg/kg/min Intravenous Titrated Brody Potter MD   Stopped at 04/18/22 1715   • ondansetron (ZOFRAN) tablet 4 mg  4 mg  Oral Q6H PRN Frank Ricks MD        Or   • ondansetron (ZOFRAN) injection 4 mg  4 mg Intravenous Q6H PRN Frank Ricks MD   4 mg at 04/16/22 1000   • potassium chloride (MICRO-K) CR capsule 40 mEq  40 mEq Oral PRN Amado Villarreal MD        Or   • potassium chloride (KLOR-CON) packet 40 mEq  40 mEq Oral PRN Amado Villarreal MD        Or   • potassium chloride 10 mEq in 100 mL IVPB  10 mEq Intravenous Q1H PRN Amado Villarreal  mL/hr at 04/19/22 1046 10 mEq at 04/19/22 1046   • potassium phosphate 45 mmol in sodium chloride 0.9 % 500 mL infusion  45 mmol Intravenous PRN Amado Villarreal MD        Or   • potassium phosphate 30 mmol in sodium chloride 0.9 % 250 mL infusion  30 mmol Intravenous PRN Amado Villarreal MD 31.3 mL/hr at 04/16/22 1900 30 mmol at 04/16/22 1900    Or   • Potassium Phosphates 15 mmol in sodium chloride 0.9 % 100 mL infusion  15 mmol Intravenous PRN Amado Villarreal MD        Or   • sodium phosphates 45 mmol in sodium chloride 0.9 % 500 mL IVPB  45 mmol Intravenous PRN Amado Villarreal MD        Or   • sodium phosphates 30 mmol in sodium chloride 0.9 % 250 mL IVPB  30 mmol Intravenous PRN Amado Villarreal MD        Or   • sodium phosphates 15 mmol in sodium chloride 0.9 % 250 mL IVPB  15 mmol Intravenous PRN Amado Villarreal MD       • propofol (DIPRIVAN) infusion 10 mg/mL 100 mL  5-50 mcg/kg/min Intravenous Titrated Brody Potter MD 8.65 mL/hr at 04/20/22 1115 20 mcg/kg/min at 04/20/22 1115   • sodium bicarbonate tablet 650 mg  650 mg Nasogastric BID Brody Potter MD       • sodium chloride 0.9 % flush 10 mL  10 mL Intravenous PRN Frank Ricks MD       • sodium chloride 0.9 % flush 10 mL  10 mL Intravenous PRN Frank Ricks MD       • sodium chloride 0.9 % flush 10 mL  10 mL Intravenous PRN Frank Ricks MD       • sodium chloride 0.9 % flush 10 mL  10 mL Intravenous Q12H Frank Ricks MD   10 mL at 04/20/22 0803   • sodium chloride 0.9 % flush 10 mL  " 10 mL Intravenous PRN Frank Ricks MD   10 mL at 04/10/22 1518     hold, 1 each  lactated ringers, 75 mL/hr, Last Rate: 75 mL/hr (04/20/22 1159)  norepinephrine, 0.02-0.3 mcg/kg/min, Last Rate: Stopped (04/18/22 1715)  propofol, 5-50 mcg/kg/min, Last Rate: 20 mcg/kg/min (04/20/22 1115)      hold  •  hydrALAZINE  •  labetalol  •  Morphine  •  ondansetron **OR** ondansetron  •  potassium chloride **OR** potassium chloride **OR** potassium chloride  •  potassium phosphate infusion greater than 15 mMoles **OR** potassium phosphate infusion greater than 15 mMoles **OR** potassium phosphate **OR** sodium phosphate IVPB **OR** sodium phosphate IVPB **OR** sodium phosphate IVPB  •  sodium chloride  •  [COMPLETED] Insert peripheral IV **AND** sodium chloride  •  [COMPLETED] Insert peripheral IV **AND** sodium chloride  •  sodium chloride    Assessment:  Vital Signs: /84   Pulse 105   Temp 98.1 °F (36.7 °C) (Axillary)   Resp 16   Ht 170.2 cm (67\")   Wt 72.2 kg (159 lb 2.8 oz)   SpO2 100%   BMI 24.93 kg/m²     Physical Exam  Vitals and nursing note reviewed.   Constitutional:       General: She is not in acute distress.     Appearance: She is ill-appearing.      Interventions: She is sedated, intubated and restrained.   HENT:      Head: Normocephalic and atraumatic.      Nose:      Comments: NG right nostril     Mouth/Throat:      Comments: ETT  Eyes:      General: Lids are normal.   Neck:      Vascular: No JVD.   Cardiovascular:      Rate and Rhythm: Tachycardia present.      Heart sounds: Normal heart sounds.   Pulmonary:      Effort: She is intubated.      Breath sounds: Decreased breath sounds present.   Abdominal:      General: Bowel sounds are decreased. There is no distension.      Palpations: Abdomen is soft.      Comments: Fecal management tube   Genitourinary:     Comments: Mojica catheter and left sided nephrostomy tube.  Musculoskeletal:      Right hand: Swelling present.      Cervical back: Neck " supple.      Right foot: Foot drop present.      Left foot: Foot drop present.   Skin:     General: Skin is warm and dry.      Coloration: Skin is pale.      Findings: Signs of injury (documented pressure injury) present.   Neurological:      Motor: Weakness and abnormal muscle tone present.      Comments: CARLI, intubated and sedated.   Psychiatric:      Comments: CARLI, intubated and sedated.     Patient status: Disease state: Deteriorating despite treatments.  Functional status: Palliative Performance Scale Score: Performance 10% based on the following measures: Ambulation: Totally bed bound, Activity and Evidence of Disease: Unable to do any work, extensive evidence of disease, Self-Care: Total care required,  Intake: Mouth care only, LOC: Drowsy or comatose. Nutritional status: Albumin 2.20.Body mass index is 24.93 kg/m².     Impression/Problem List:  1.    Altered mental status / concerns for PRES per MRI of brain / neurology suspect anoxic injury   2.    Impaired mobility and ability to perform activities of daily living  3.    Severe malnutrition  4.    Urinary tract infection  5.    Sepsis secondary to UTI  6.    Hypotension   7.    Abnormal EEG  8.    Pulmonary embolism  9.    Anemia  10.  Ileus  11.  Hypocalcemia  12.  Hepatic steatosis  13.  Malfunctioning nephrostomy tube  14.  Folate deficiency  15.  Anxiety  16.  Lactic acidosis  17.  Dysphagia    18.  Intractable nausea and vomiting, improved  19.  History of COVID-19 (August 2021)  20.  Presence of NG  21.  Elevated AST  22.  Impaired functioning of gallbladder (sludge-filled and 20% EF per imaging)    Plans/Recommendations     1. Goals of care include CODE STATUS CPR with full support interventions.    2. Palliative care encounter  - Prognosis is guarded secondary to altered mental status with abnormal MRI and EEG, impaired mobility and ability to perform activities of daily living, severe malnutrition, urinary tract infection, sepsis, hypotension,  malfunctioning nephrostomy tube, anemia, ileus, dysphagia, hypokalemia, elevated AST, hepatic steatosis, suspicion for abscess of left abductor major and other comorbidities listed above.  - Spoke to patient's spouse, Grant, reports he remains hopeful for improvements and is anxiously awaiting transfer to .   - Appreciative of call, reports he is planning to visit in the AM, will plan to follow up with him then.       Thank you for allowing us to participate in patient's plan of care. Palliative Care Team will continue to follow patient.     Time spent: 30 minutes spent reviewing medical and medication records, assessing and examining patient, discussing with family, answering questions, providing some guidance about a plan and documentation of care, and coordinating care with other healthcare members, with > 50% time spent face to face.       Nneka Barton, APRN  4/20/2022

## 2022-04-20 NOTE — PROGRESS NOTES
Neurology Progress Note      Date of admission: 4/2/2022  8:46 PM  Date of visit: 4/20/2022    Chief Complaint:  F/u encephalopathy    Subjective     Subjective:  No events overnight.  Patient remains intubated and on propofol.  She currently is off pressor agents.  She is pending transfer to [facility].  She has been accepted but is awaiting a bed.  CSF studies remain unremarkable.  Medications:  Current Facility-Administered Medications   Medication Dose Route Frequency Provider Last Rate Last Admin   • ertapenem (INVanz) 1 g in sodium chloride 0.9 % 100 mL IVPB-VTB  1 g Intravenous Q24H Amado Villarreal  mL/hr at 04/19/22 1539 1 g at 04/19/22 1539   • famotidine (PEPCID) injection 20 mg  20 mg Intravenous Q12H Amado Villarreal MD   20 mg at 04/20/22 0803   • FLUoxetine (PROzac) capsule 20 mg  20 mg Oral Nightly Amado Villarreal MD   20 mg at 04/19/22 2021   • fluticasone (FLONASE) 50 MCG/ACT nasal spray 2 spray  2 spray Each Nare BID Frank Ricks MD   2 spray at 04/20/22 0803   • folic acid (FOLVITE) tablet 1 mg  1 mg Oral Daily Amado Villarreal MD   1 mg at 04/19/22 2021   • Hold medication  1 each Does not apply Continuous PRN Abiola Yan MD       • hydrALAZINE (APRESOLINE) injection 10 mg  10 mg Intravenous Q4H PRN Amado Villarreal MD       • labetalol (NORMODYNE,TRANDATE) injection 10 mg  10 mg Intravenous Q4H PRN Amado Villarreal MD   10 mg at 04/10/22 1518   • lactated ringers infusion  75 mL/hr Intravenous Continuous Amado Villarreal MD 75 mL/hr at 04/19/22 0445 75 mL/hr at 04/19/22 0445   • levETIRAcetam in NaCl 0.82% (KEPPRA) IVPB 500 mg  500 mg Intravenous Q12H Abiola Yan MD   500 mg at 04/20/22 0803   • Morphine sulfate (PF) injection 2 mg  2 mg Intravenous Q4H PRN Amado Villarreal MD       • neomycin-polymyxin-hydrocortisone (CORTISPORIN) otic suspension 4 drop  4 drop Both Ears Q6H Fermin Mcclure DO   4 drop at 04/20/22 0612   • norepinephrine (LEVOPHED) 8  mg in 250 mL NS infusion (premix)  0.02-0.3 mcg/kg/min Intravenous Titrated Brody Potter MD   Stopped at 04/18/22 1715   • ondansetron (ZOFRAN) tablet 4 mg  4 mg Oral Q6H PRN Frank Ricks MD        Or   • ondansetron (ZOFRAN) injection 4 mg  4 mg Intravenous Q6H PRN Frank Ricks MD   4 mg at 04/16/22 1000   • potassium chloride (MICRO-K) CR capsule 40 mEq  40 mEq Oral PRN Amado Villarreal MD        Or   • potassium chloride (KLOR-CON) packet 40 mEq  40 mEq Oral PRN Amado Villarreal MD        Or   • potassium chloride 10 mEq in 100 mL IVPB  10 mEq Intravenous Q1H PRN Amado Villarreal  mL/hr at 04/19/22 1046 10 mEq at 04/19/22 1046   • potassium phosphate 45 mmol in sodium chloride 0.9 % 500 mL infusion  45 mmol Intravenous PRN Amado Villarreal MD        Or   • potassium phosphate 30 mmol in sodium chloride 0.9 % 250 mL infusion  30 mmol Intravenous PRN Amado Villarreal MD 31.3 mL/hr at 04/16/22 1900 30 mmol at 04/16/22 1900    Or   • Potassium Phosphates 15 mmol in sodium chloride 0.9 % 100 mL infusion  15 mmol Intravenous PRN Amado Villarreal MD        Or   • sodium phosphates 45 mmol in sodium chloride 0.9 % 500 mL IVPB  45 mmol Intravenous PRN Amado Villarreal MD        Or   • sodium phosphates 30 mmol in sodium chloride 0.9 % 250 mL IVPB  30 mmol Intravenous PRN Amado Villarreal MD        Or   • sodium phosphates 15 mmol in sodium chloride 0.9 % 250 mL IVPB  15 mmol Intravenous PRN Amado Villarreal MD       • propofol (DIPRIVAN) infusion 10 mg/mL 100 mL  5-50 mcg/kg/min Intravenous Titrated Brody Potter MD 8.65 mL/hr at 04/20/22 0450 20 mcg/kg/min at 04/20/22 0450   • sodium bicarbonate tablet 650 mg  650 mg Oral BID Amado Villarreal MD   650 mg at 04/20/22 0803   • sodium chloride 0.9 % flush 10 mL  10 mL Intravenous PRN Frank Ricks MD       • sodium chloride 0.9 % flush 10 mL  10 mL Intravenous PRN Frank Ricks MD       • sodium chloride 0.9 % flush 10 mL  10 mL  Intravenous PRN Frank Ricks MD       • sodium chloride 0.9 % flush 10 mL  10 mL Intravenous Q12H Frank Ricks MD   10 mL at 04/20/22 0803   • sodium chloride 0.9 % flush 10 mL  10 mL Intravenous PRN Frank Ricks MD   10 mL at 04/10/22 1518       Review of Systems:   -A 14 point review of systems is unobtainable as she is unresponsive  Objective     Objective      Vital Signs  Temp:  [96.5 °F (35.8 °C)-97.5 °F (36.4 °C)] 96.5 °F (35.8 °C)  Heart Rate:  [] 89  Resp:  [15-19] 15  BP: ()/(53-93) 126/91  FiO2 (%):  [30 %] 30 %    Physical Exam:    HEENT:  Neck supple  CVS:  Regular rate and rhythm.  No murmurs  Carotid Examination:  No bruits  Lungs:  Clear to auscultation  Abdomen:  Non-tender, Non-distended  Extremities:  No signs of peripheral edema    Neurologic Exam:  Propofol held 25 minutes before examining    -Unresponsive and intubated  May open eyes to stimulation  -Does not follows any commands    Cranial nerves II through XII intact.  Pupils react  No response to visual threat  No doll eyes  Grimaces  And frontalis symmetric  Minimal movement lower face bilateral  Seems to hear  Breathes above vent and coughs when suctioned    Motor: (strength out of 5:  1= minimal movement, 2 = movement in plane of gravity, 3 = movement against gravity, 4 = movement against some resistance, 5 = full strength)    Withdraws in all extremities.  Some increased tone noted throughout.    DTR:  3+ throughout in upper extremities  Absent in lower extremities    Sensory:  Only responds to noxious stimuli    Coordination/Gait:  -No spontaneous movement to assess     Results Review:    I reviewed the patient's new clinical results.    Lab Results (last 24 hours)     Procedure Component Value Units Date/Time    Anaerobic Culture - Cerebrospinal Fluid, Spine, Lumbar [[id number]]  (Normal) Collected: 04/17/22 1239    Specimen: Cerebrospinal Fluid from Spine, Lumbar Updated: 04/20/22 0609     Anaerobic  Culture No anaerobes isolated at 3 days    Culture, CSF - Cerebrospinal Fluid, Lumbar Puncture [[id number]] Collected: 04/17/22 1239    Specimen: Cerebrospinal Fluid from Lumbar Puncture Updated: 04/20/22 0535     CSF Culture No growth at 3 days     Gram Stain No WBCs or organisms seen    Comprehensive Metabolic Panel [[id number]]  (Abnormal) Collected: 04/20/22 0417    Specimen: Blood Updated: 04/20/22 0446     Glucose 79 mg/dL      BUN 5 mg/dL      Creatinine 0.71 mg/dL      Sodium 142 mmol/L      Potassium 3.7 mmol/L      Chloride 111 mmol/L      CO2 24.0 mmol/L      Calcium 8.3 mg/dL      Total Protein 4.1 g/dL      Albumin 1.70 g/dL      ALT (SGPT) 20 U/L      AST (SGOT) 46 U/L      Alkaline Phosphatase 78 U/L      Total Bilirubin 0.5 mg/dL      Globulin 2.4 gm/dL      A/G Ratio 0.7 g/dL      BUN/Creatinine Ratio 7.0     Anion Gap 7.0 mmol/L      eGFR 107.7 mL/min/1.73      Comment: National Kidney Foundation and American Society of Nephrology (ASN) Task Force recommended calculation based on the Chronic Kidney Disease Epidemiology Collaboration (CKD-EPI) equation refit without adjustment for race.       Narrative:      GFR Normal >60  Chronic Kidney Disease <60  Kidney Failure <15      CBC & Differential [[id number]]  (Abnormal) Collected: 04/20/22 0417    Specimen: Blood Updated: 04/20/22 0427    Narrative:      The following orders were created for panel order CBC & Differential.  Procedure                               Abnormality         Status                     ---------                               -----------         ------                     CBC Auto Differential[[id number]]        Abnormal            Final result                 Please view results for these tests on the individual orders.    CBC Auto Differential [[id number]]  (Abnormal) Collected: 04/20/22 0417    Specimen: Blood Updated: 04/20/22 0427     WBC 8.27 10*3/mm3      RBC 2.51 10*6/mm3      Hemoglobin 7.4 g/dL      Hematocrit 22.4 %       MCV 89.2 fL      MCH 29.5 pg      MCHC 33.0 g/dL      RDW 15.9 %      RDW-SD 46.8 fl      MPV 9.7 fL      Platelets 218 10*3/mm3      Neutrophil % 64.2 %      Lymphocyte % 16.1 %      Monocyte % 8.7 %      Eosinophil % 5.6 %      Basophil % 0.7 %      Immature Grans % 4.7 %      Neutrophils, Absolute 5.31 10*3/mm3      Lymphocytes, Absolute 1.33 10*3/mm3      Monocytes, Absolute 0.72 10*3/mm3      Eosinophils, Absolute 0.46 10*3/mm3      Basophils, Absolute 0.06 10*3/mm3      Immature Grans, Absolute 0.39 10*3/mm3      nRBC 0.0 /100 WBC     Blood Culture - Blood, Arm, Left [[id number]]  (Normal) Collected: 04/16/22 0346    Specimen: Blood from Arm, Left Updated: 04/20/22 0418     Blood Culture No growth at 4 days    Blood Culture - Blood, Arm, Left [[id number]]  (Normal) Collected: 04/16/22 0112    Specimen: Blood from Arm, Left Updated: 04/20/22 0147     Blood Culture No growth at 4 days    Blood Culture With AASHISH - Blood, Arm, Right [[id number]]  (Normal) Collected: 04/14/22 1938    Specimen: Blood from Arm, Right Updated: 04/19/22 2017     Blood Culture No growth at 5 days    Potassium [[id number]]  (Normal) Collected: 04/19/22 1650    Specimen: Blood Updated: 04/19/22 1758     Potassium 4.5 mmol/L      Comment: Slight hemolysis detected by analyzer. Results may be affected.       Non-gynecologic Cytology [[id number]] Collected: 04/17/22 1239    Specimen: Cerebrospinal Fluid from Lumbar Puncture Updated: 04/19/22 0920     Case Report --     Non-gynecologic Cytology                          Case: [id number]                                  Authorizing Provider:  Abiola Yan MD Collected:           04/17/2022 12:39 PM          Ordering Location:     [facility]     Received:            04/18/2022 08:48 AM                                 [facility]                                                                 Pathologist:           Soren Dugan MD                                                         Specimen:    Lumbar Puncture, CSF                                                                        Final Diagnosis --     Cerebrospinal fluid, lumbar puncture:  Negative for malignant cells.  Virtually acellular fluid.       Gross Description --     1. Lumbar Puncture.  Received fresh in the laboratory in a container labeled with patient name and  designated as CSF.  2 mls of clear colorless fluid.  2 cytospin slides prepared.         Microscopic Description --     Performed.          Imaging Results (Last 24 Hours)     Procedure Component Value Units Date/Time    XR Chest 1 View [[id number]] Collected: 22     Updated: 22    Narrative:      HISTORY: Intubated     CXR: Frontal view the chest obtained     COMPARISON: 2022     FINDINGS: The endotracheal tube is slightly low-lying with the tip  position 1.5 cm above the jaime. Enteric tube tip in the fundus of the  stomach.     There are bibasilar densities, patchy atelectasis or possibly pneumonia.  No radiographic evidence of edema. No pleural effusion or pneumothorax.  Stable mediastinal contours.       Impression:      1. Slightly low-lying endotracheal tube. Enteric tube tip in the fundus  of stomach.  2. Bibasilar densities, slightly increased from yesterday's exam  represent patchy atelectasis, however early pneumonia considered.  This report was finalized on 2022 07:24 by Dr. Rina Ricardo MD.    XR Abdomen KUB [[id number]] Collected: 22     Updated: 22    Narrative:      EXAMINATION: KV radiograph 2022     HISTORY: Ileus.     FINDINGS: Today's exam is compared to previous study of 4 days earlier.  There is improving colonic and small bowel distention consistent with a  resolving ileus. A left-sided nephrostomy tube and ureteral stent are  stable in position from the previous exam. A NG tube remains in place.  There is no evidence of free air.       Impression:      1.. Improving  ileus with diminishing small bowel and colonic distention.  This report was finalized on 04/20/2022 07:15 by Dr. Yuniel De Jesus MD.    XR Chest 1 View [[id number]] Collected: 04/20/22 0711     Updated: 04/20/22 0716    Narrative:      EXAMINATION: Chest 1 view 4/19/2022     HISTORY: Tube exchange     FINDINGS: Upright frontal projection of the chest is compared to  previous exam of one day earlier. An endotracheal tube and NG tube  remain in place. There is elevation of the right diaphragm with mild  right basilar atelectasis. Lungs are otherwise clear. There is no  effusion or free air present.       Impression:      1.. No interval line changes.  2. Elevated right diaphragm with right basilar atelectasis.  This report was finalized on 04/20/2022 07:12 by Dr. Yuniel De Jesus MD.    US Venous Doppler Lower Extremity Bilateral (duplex) [[id number]] Collected: 04/19/22 1527     Updated: 04/19/22 1531    Narrative:      History: Swelling       Impression:      Impression: There is no evidence of deep venous thrombosis or  superficial thrombophlebitis of right or left lower extremities.     Comments: Bilateral lower extremity venous duplex exam was performed  using color Doppler flow, Doppler waveform analysis, and grayscale  imaging, with and without compression. There is no evidence of deep  venous thrombosis in the common femoral, superficial femoral, popliteal,  peroneal, anterior tibial, and posterior tibial veins bilaterally. No  thrombus is identified in the saphenofemoral junctions and greater  saphenous veins bilaterally.         This report was finalized on 04/19/2022 15:27 by Dr. Robin Kam MD.    XR Chest 1 View [[id number]] Collected: 04/19/22 0839     Updated: 04/19/22 0845    Narrative:      EXAMINATION: XR CHEST 1 VW- 4/19/2022 8:39 AM CDT     HISTORY: vent check; E87.6-Hypokalemia; N39.0-Urinary tract infection,  site not specified; R31.9-Hematuria, unspecified; R11.2-Nausea with  vomiting,  unspecified; E87.2-Acidosis; R13.10-Dysphagia, unspecified;  A41.9-Sepsis, unspecified organism; N39.0-Urinary tract infection, site  not specified.     REPORT: A frontal view of the chest was obtained.     COMPARISON: Chest x-ray 4/18/2022 1300 hours.     Endotracheal tube projects over the midline trachea with tip  approximately 15 mm superior to the jaime, satisfactory and stable. The  tip of the NG tube projects over the gastric fundus as before. There is  partial visualization of the internal and external left nephrostomy  tube, oriented vertically over the left lower chest and upper abdomen.  There is mild atelectasis in the lung bases greater on the left. No lung  consolidation is identified. There is no pneumothorax or pleural  effusion. No acute osseous findings. Mild levoscoliosis of the thoracic  spine.       Impression:      Stable 1 day appearance of the chest.  This report was finalized on 04/19/2022 08:42 by Dr. Florian Laurent MD.          CSF:  Meningitis/encephalitis panel neg  -Red 4, Whites 1  -Protein:  34.5  -Glucose: 65  -Paraneoplastic pending    EEG:  slowing    Assessment/Plan     Hospital Problem List      Intractable nausea and vomiting    Sepsis secondary to UTI (HCC)    Lactic acidosis    Hypokalemia    Essential (primary) hypertension    UTI (urinary tract infection), bacterial    Malfunction of nephrostomy tube (HCC)    Severe malnutrition (CMS/HCC)    Hypophosphatemia    Impression:  · Altered mentation  · Hypotension requiring pressors  · Currently off of pressors  · Anemia  · Urinary Tract infection  · Failure to thrive  · S/p COVID  Plan:  · CSF studies unremarkable thus far  · MRI consistent with diffuse anoxic injury despite no definitive event.    · She has been accepted at  Neurology once a bed is available        Taj Payne MD  04/20/22  08:41 CDT

## 2022-04-20 NOTE — PLAN OF CARE
Goal Outcome Evaluation:           Progress: no change  Outcome Evaluation: Nutrition follow up, cnslt to resume TF this am. Orders placed to initiate Peptamen Intense VHP at trophic rate of 15ml/hr for 24 hrs then may advance by 10 ml/hr every 24 hrs as tolerated to goal of 55 ml/hr. Water flush 15 ml/hr. Will continue to follow.

## 2022-04-20 NOTE — PLAN OF CARE
Goal Outcome Evaluation:           Progress: no change  Outcome Evaluation: Pt remains intubated and sedated, propofol at 20. Pt withdraws and LOPEZ. Pupils PEERL. At 2000 assessment, pt was gurgling, suctioned secretions without improvement in gurgling. Call to RT was made, RT to bedside, ETT cuff was blown. Tube was exchanged by RT, CXR obtained, tube in good position. 23 at the lip. Afebrile, VSS. Continue to monitor.

## 2022-04-21 VITALS
OXYGEN SATURATION: 98 % | HEIGHT: 67 IN | RESPIRATION RATE: 16 BRPM | TEMPERATURE: 98.4 F | DIASTOLIC BLOOD PRESSURE: 76 MMHG | BODY MASS INDEX: 24.23 KG/M2 | HEART RATE: 90 BPM | SYSTOLIC BLOOD PRESSURE: 114 MMHG | WEIGHT: 154.4 LBS

## 2022-04-21 LAB
ALBUMIN SERPL-MCNC: 1.8 G/DL (ref 3.5–5.2)
ALBUMIN/GLOB SERPL: 0.8 G/DL
ALP SERPL-CCNC: 83 U/L (ref 39–117)
ALT SERPL W P-5'-P-CCNC: 17 U/L (ref 1–33)
ANION GAP SERPL CALCULATED.3IONS-SCNC: 9 MMOL/L (ref 5–15)
AST SERPL-CCNC: 33 U/L (ref 1–32)
BACTERIA SPEC AEROBE CULT: NORMAL
BACTERIA SPEC AEROBE CULT: NORMAL
BASOPHILS # BLD AUTO: 0.05 10*3/MM3 (ref 0–0.2)
BASOPHILS NFR BLD AUTO: 0.6 % (ref 0–1.5)
BILIRUB SERPL-MCNC: 0.5 MG/DL (ref 0–1.2)
BUN SERPL-MCNC: 6 MG/DL (ref 6–20)
BUN/CREAT SERPL: 7.8 (ref 7–25)
CALCIUM SPEC-SCNC: 7.9 MG/DL (ref 8.6–10.5)
CHLORIDE SERPL-SCNC: 114 MMOL/L (ref 98–107)
CO2 SERPL-SCNC: 25 MMOL/L (ref 22–29)
CREAT SERPL-MCNC: 0.77 MG/DL (ref 0.57–1)
DEPRECATED RDW RBC AUTO: 48.5 FL (ref 37–54)
EGFRCR SERPLBLD CKD-EPI 2021: 97.7 ML/MIN/1.73
EOSINOPHIL # BLD AUTO: 0.53 10*3/MM3 (ref 0–0.4)
EOSINOPHIL NFR BLD AUTO: 5.9 % (ref 0.3–6.2)
ERYTHROCYTE [DISTWIDTH] IN BLOOD BY AUTOMATED COUNT: 16.4 % (ref 12.3–15.4)
GLOBULIN UR ELPH-MCNC: 2.2 GM/DL
GLUCOSE BLDC GLUCOMTR-MCNC: 82 MG/DL (ref 70–130)
GLUCOSE BLDC GLUCOMTR-MCNC: 92 MG/DL (ref 70–130)
GLUCOSE SERPL-MCNC: 82 MG/DL (ref 65–99)
HCT VFR BLD AUTO: 23.7 % (ref 34–46.6)
HGB BLD-MCNC: 7.7 G/DL (ref 12–15.9)
HU1 AB TITR CSF IF: NORMAL TITER
HU2 AB TITR CSF IF: NORMAL TITER
IMM GRANULOCYTES # BLD AUTO: 0.41 10*3/MM3 (ref 0–0.05)
IMM GRANULOCYTES NFR BLD AUTO: 4.6 % (ref 0–0.5)
LYMPHOCYTES # BLD AUTO: 1.22 10*3/MM3 (ref 0.7–3.1)
LYMPHOCYTES NFR BLD AUTO: 13.6 % (ref 19.6–45.3)
MCH RBC QN AUTO: 29.4 PG (ref 26.6–33)
MCHC RBC AUTO-ENTMCNC: 32.5 G/DL (ref 31.5–35.7)
MCV RBC AUTO: 90.5 FL (ref 79–97)
MONOCYTES # BLD AUTO: 0.79 10*3/MM3 (ref 0.1–0.9)
MONOCYTES NFR BLD AUTO: 8.8 % (ref 5–12)
NEUTROPHILS NFR BLD AUTO: 5.95 10*3/MM3 (ref 1.7–7)
NEUTROPHILS NFR BLD AUTO: 66.5 % (ref 42.7–76)
NRBC BLD AUTO-RTO: 0 /100 WBC (ref 0–0.2)
PLATELET # BLD AUTO: 292 10*3/MM3 (ref 140–450)
PMV BLD AUTO: 9.5 FL (ref 6–12)
POTASSIUM SERPL-SCNC: 3.9 MMOL/L (ref 3.5–5.2)
PROT SERPL-MCNC: 4 G/DL (ref 6–8.5)
PURKINJE CELLS AB TITR CSF IF: NORMAL TITER
RBC # BLD AUTO: 2.62 10*6/MM3 (ref 3.77–5.28)
SODIUM SERPL-SCNC: 148 MMOL/L (ref 136–145)
WBC NRBC COR # BLD: 8.95 10*3/MM3 (ref 3.4–10.8)

## 2022-04-21 PROCEDURE — 99232 SBSQ HOSP IP/OBS MODERATE 35: CPT

## 2022-04-21 PROCEDURE — 25010000002 PROPOFOL 10 MG/ML EMULSION: Performed by: FAMILY MEDICINE

## 2022-04-21 PROCEDURE — 85025 COMPLETE CBC W/AUTO DIFF WBC: CPT | Performed by: FAMILY MEDICINE

## 2022-04-21 PROCEDURE — 25010000002 LEVETIRACETAM IN NACL 0.82% 500 MG/100ML SOLUTION: Performed by: PSYCHIATRY & NEUROLOGY

## 2022-04-21 PROCEDURE — 99233 SBSQ HOSP IP/OBS HIGH 50: CPT | Performed by: PSYCHIATRY & NEUROLOGY

## 2022-04-21 PROCEDURE — 82962 GLUCOSE BLOOD TEST: CPT

## 2022-04-21 PROCEDURE — 80053 COMPREHEN METABOLIC PANEL: CPT | Performed by: FAMILY MEDICINE

## 2022-04-21 PROCEDURE — 94003 VENT MGMT INPAT SUBQ DAY: CPT

## 2022-04-21 PROCEDURE — 94799 UNLISTED PULMONARY SVC/PX: CPT

## 2022-04-21 RX ORDER — SODIUM BICARBONATE 650 MG/1
650 TABLET ORAL 2 TIMES DAILY
Qty: 30 TABLET | Refills: 0
Start: 2022-04-21

## 2022-04-21 RX ORDER — LEVETIRACETAM 5 MG/ML
500 INJECTION INTRAVASCULAR EVERY 12 HOURS SCHEDULED
Qty: 4000 ML
Start: 2022-04-21

## 2022-04-21 RX ORDER — NOREPINEPHRINE BIT/0.9 % NACL 8 MG/250ML
.02-.3 INFUSION BOTTLE (ML) INTRAVENOUS
Qty: 1 ML | Refills: 0
Start: 2022-04-21

## 2022-04-21 RX ORDER — SODIUM CHLORIDE, SODIUM LACTATE, POTASSIUM CHLORIDE, CALCIUM CHLORIDE 600; 310; 30; 20 MG/100ML; MG/100ML; MG/100ML; MG/100ML
75 INJECTION, SOLUTION INTRAVENOUS CONTINUOUS
Qty: 1 ML | Refills: 0
Start: 2022-04-21

## 2022-04-21 RX ADMIN — PROPOFOL INJECTABLE EMULSION 20 MCG/KG/MIN: 10 INJECTION, EMULSION INTRAVENOUS at 00:41

## 2022-04-21 RX ADMIN — SODIUM BICARBONATE 650 MG: 650 TABLET ORAL at 09:34

## 2022-04-21 RX ADMIN — SODIUM CHLORIDE, POTASSIUM CHLORIDE, SODIUM LACTATE AND CALCIUM CHLORIDE 75 ML/HR: 600; 310; 30; 20 INJECTION, SOLUTION INTRAVENOUS at 00:41

## 2022-04-21 RX ADMIN — FLUTICASONE PROPIONATE 2 SPRAY: 50 SPRAY, METERED NASAL at 09:35

## 2022-04-21 RX ADMIN — PROPOFOL INJECTABLE EMULSION 20 MCG/KG/MIN: 10 INJECTION, EMULSION INTRAVENOUS at 09:11

## 2022-04-21 RX ADMIN — FAMOTIDINE 20 MG: 10 INJECTION INTRAVENOUS at 09:34

## 2022-04-21 RX ADMIN — LEVETIRACETAM 500 MG: 5 INJECTION INTRAVASCULAR at 09:34

## 2022-04-21 RX ADMIN — Medication 10 ML: at 09:11

## 2022-04-21 NOTE — DISCHARGE SUMMARY
[facility] Medicine Services  DISCHARGE SUMMARY       Date of Admission: 4/2/2022  Date of Discharge:  4/21/2022  Primary Care Physician: David Lara MD    Presenting Problem/History of Present Illness:  Hypokalemia [E87.6]     Final Discharge Diagnoses:  Active Hospital Problems    Diagnosis    • **Intractable nausea and vomiting    • Severe malnutrition (CMS/HCC)    • Hypophosphatemia    • Hypokalemia    • UTI (urinary tract infection), bacterial    • Malfunction of nephrostomy tube (HCC)    • Lactic acidosis    • Sepsis secondary to UTI (HCC)    • Essential (primary) hypertension       1.  AMS:  Etiology unknown  -LP negative  -Encephalitis panel negative  -Paraneoplastic panel pending  -Neuro following  -on Empiric Keppra  -MRI concerning for ischemic insult but no significant hypoxic events     2.  Thigh hematoma  -AC held  -Surgery following     3.  HTN  -monitor     4.  ESBL UTI  -IV Abx  -ID following     4.  History of bladder rupture  -continue stratton     5.  Presence of Nephrostomy tubes  -monitor  -will need removal at some point, can be done as outpatient     6.  ?Small PE  -CTA on 4/7 was negative  -CT Abdomen and Pelvis on 4/15 shows small filling defect in the right lung.    -Duplex scan is negative for DVT  -not currently able to tolerate AC due to bleeding into thigh  -question if filling defect is related to contrast phase/timing    7.  Ileus  -Surgery following  -Distention improved on most recent KUB  -Surgery ok with restarting tube feeds slowly         DVT PPX:  SCD's  Consults:   1.  GI  2.  Urology  3.  ENT  4.  Neurology  5.  Infectious Disease  6.  Neurology  7.  General Surgery    Procedures Performed:   n/a    Pertinent Test Results:   MRI Brain:   4/18/22  IMPRESSION:  1. No abnormal enhancement identified within the region of hyperintense  T2/FLAIR signal involving the bilateral frontal,parietal and occipital  cortical regions.  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 No abnormal enhancement identified.    MRI Brain 4/16/22:  IMPRESSION:  Impression:   Abnormal signal with restricted diffusion in the bilateral occipital and  also subcortical bilateral parietal cortex. Most likely this is an  encephalitis.            Results for orders placed during the hospital encounter of 08/27/21    Adult Transthoracic Echo Complete W/ Cont if Necessary Per Protocol    Interpretation Summary  · Estimated left ventricular EF = 60% Left ventricular systolic function is normal.  · Left ventricular diastolic function is consistent with (grade I) impaired relaxation.  · The right ventricular cavity is mildly dilated. Systolic function is normal.  · The right atrial cavity is mildly dilated.  · There is mild aortic and tricuspid valve regurgitation present.  · Estimated right ventricular systolic pressure from tricuspid regurgitation is normal (<35 mmHg).      Chief Complaint on Day of Discharge:   Intubated    History of Present Illness on Day of Discharge:   Intubated/Sedated    Hospital Course:  The patient is a 46 y.o. female who presented to [facility] with Nausea and vomiting.  She had a UTI and hypokalemia.  She was treated with IV abx.  Her potassium was replaced.  She was treated witih IV fluids.  GI was consulted given the nausea and vomiting.  They helped with medical management of this.      She has a complex urological history including history of bladder rupture and placement of nephrostomy tubes.  The Nephrostomy tubes are malfunctioning.  Urology was consulted.  As her renal function is WNL, Urology recommended outpatient follow up with her physicians at [place].      She developed hearing loss and ear pain.  ENT was consulted.  They arranged for an audiogram, but the patient refused this.  This was felt to be non-urgent and the plan was to pursue this at a later point.    She  developed decreased mentation.  Neurology was consulted.  They performed an EEG which showed generalized slowing.  They felt this was related to metabolic encephalopathy related to the UTI.  MRI of the brain was obtained that was concerning for encephalitis.  Neurology was concerned for the possibility of underlying seizures.  They started her on Empiric Keppra.  Repeat MRI was read as above by radiology.  Neurology felt the MRI was very abnormal.  They felt it looked consistent with an ischemic insult.  However the patient has not had any major hypoxic episodes.      Infectious Disease was consulted given Infectious Issues.  They managed her antibiotics.   LP was obtained and unremarkable.  It was felt she did not feel she had an encephalitis.  Urine cultures did grow out ESBL Klebsiella.  She has been treated with Invanz.  She completed a 7 day course of this.      She developed left thigh swelling.  CT was obtained and concerning for abscess.  General surgery was consulted and actually felt this was a hematoma.  Her hemoglobin had dropped, so this was consistent with that theory.  She was transfused 3 units of prbc's.  Her anticoagulation was held.  Her hgb has stabilized.      She had an NGT placed.  KUB showed colonic and small bowel distention concerning for ileus.  Tube feeds were held.  Repeat exam showed improvement in this.  Surgery was ok with restarting tube feeds at a slow rate.     She has had persistently decreased mental status.  When she went for repeat MRI, her oxygen saturations dropped into the low 80's.  She was intubated for airway protection.    Of note:  She underwent CTA chest on 4/7/22 that showed no PE.  CT abdomen and pelvis on 4/15/22 makes note of a small right lower lobe filling defect that raises the question of a PE.  She had been anticoagulated up to this point.  Duplex scans show no DVT.  I suspect this is more related contrast phasing/timing.  Given her thigh hematoma,  "anticoagulation has been held.  It may be worth repeating a CTA once Neurological issues are better understood/resolved.      Due to the overall complexity of her care and especially her Neurological issues, transfer has been arranged to the [facility].  The patient has been accepted to the service of Dr. Torrez.  Due to her history of care at [facility], we did make multiple attempts to transfer the patient there.  They have been at capacity and unable to offer a bed at this time.                   Condition on Discharge:  stable    Physical Exam on Discharge:  /75   Pulse 92   Temp 98.6 °F (37 °C) (Axillary)   Resp 16   Ht 170.2 cm (67\")   Wt 70 kg (154 lb 6.4 oz)   SpO2 99%   BMI 24.18 kg/m²   Physical Exam  Physical Exam  Constitutional:       Appearance: Normal appearance. She is well-developed.      Interventions: She is sedated and intubated.   HENT:      Head: Normocephalic and atraumatic.      Right Ear: Tympanic membrane, ear canal and external ear normal.      Left Ear: Tympanic membrane, ear canal and external ear normal.      Nose: Nose normal.      Mouth/Throat:      Mouth: Mucous membranes are moist.      Pharynx: Oropharynx is clear.   Eyes:      Extraocular Movements: Extraocular movements intact.      Conjunctiva/sclera: Conjunctivae normal.      Pupils: Pupils are equal, round, and reactive to light.   Cardiovascular:      Rate and Rhythm: Normal rate and regular rhythm.      Heart sounds: Normal heart sounds.   Pulmonary:      Effort: Pulmonary effort is normal. She is intubated.      Breath sounds: Normal breath sounds.   Abdominal:      General: Bowel sounds are normal. There is no distension.      Palpations: Abdomen is soft.      Tenderness: There is no abdominal tenderness.   Musculoskeletal:         General: Normal range of motion.      Cervical back: Normal range of motion and neck supple.   Skin:     General: Skin is warm and dry.         Discharge " Disposition:  Short Term Hospital (DC - External)    Discharge Medications:     Discharge Medications      New Medications      Instructions Start Date   lactated ringers infusion   75 mL/hr (75 mL/hr), Intravenous, Continuous      levETIRAcetam in NaCl 0.82% 500 MG/100ML solution IVPB  Commonly known as: KEPPRA   500 mg, Intravenous, Every 12 Hours Scheduled      Norepinephrine-Sodium Chloride 8-0.9 MG/250ML-% solution infusion  Commonly known as: LEVOPHED   0.02-0.3 mcg/kg/min (1.442-21.63 mcg/min), Intravenous, Titrated      propofol 10 mg/mL emulsion infusion  Commonly known as: DIPRIVAN   5-50 mcg/kg/min (360.5-3,605 mcg/min), Intravenous, Titrated      sodium bicarbonate 650 MG tablet   650 mg, Oral, 2 Times Daily         Continue These Medications      Instructions Start Date   butalbital-acetaminophen-caffeine -40 MG per tablet  Commonly known as: FIORICET, ESGIC   2 tablets, Oral, Every 4 Hours PRN      Calcium Carbonate-Simethicone 750-80 MG chewable tablet   1 tablet, Oral, Daily      eszopiclone 3 MG tablet  Commonly known as: LUNESTA   3 mg, Oral, Nightly, Take immediately before bedtime      FLUoxetine 40 MG capsule  Commonly known as: PROzac   40 mg, Oral, Daily      methocarbamol 500 MG tablet  Commonly known as: ROBAXIN   500 mg, Oral, 3 Times Daily PRN      metoclopramide 5 MG/5ML solution  Commonly known as: REGLAN   5 mg, Oral, 3 Times Daily Before Meals      ondansetron ODT 4 MG disintegrating tablet  Commonly known as: ZOFRAN-ODT   4 mg, Translingual, 4 Times Daily PRN      oxymetazoline 0.05 % nasal spray  Commonly known as: AFRIN   2 sprays, Nasal, 2 Times Daily      pantoprazole 20 MG EC tablet  Commonly known as: PROTONIX   20 mg, Oral, Daily      prochlorperazine 10 MG tablet  Commonly known as: COMPAZINE   10 mg, Oral, Every 8 Hours PRN      promethazine 25 MG tablet  Commonly known as: PHENERGAN   25-50 mg, Oral, Every 6 Hours PRN      rizatriptan 10 MG tablet  Commonly known as:  MAXALT   10 mg, Oral, Once As Needed, May repeat in 2 hours if needed      traZODone 100 MG tablet  Commonly known as: DESYREL   100 mg, Oral, Nightly         Stop These Medications    rivaroxaban 20 MG tablet  Commonly known as: XARELTO            Discharge Diet:     Activity at Discharge: bed rest    Discharge Care Plan/Instructions:   Follow up at [facility] today.    Follow-up Appointments:   No future appointments.    Test Results Pending at Discharge:   Paraneoplastic panel    Electronically signed by Brody Potter MD, 04/21/22, 11:24 CDT.    Time: 60 minutes

## 2022-04-21 NOTE — PROGRESS NOTES
"INFECTIOUS DISEASES PROGRESS NOTE    Patient:  Namita Zabala  YOB: 1975  MRN: [id number]   Admit date: 2022   Admitting Physician: Brody Potter MD  Primary Care Physician: David Lara MD    Chief Complaint: Unobtainable from patient as she is intubated      Interval History: Patient's  is at bedside.  No new changes reported.      Allergies:   Allergies   Allergen Reactions   • Coconut Unknown - High Severity   • Nuts Unknown - High Severity   • Penicillins    • [place] Other (See Comments)     Causes migraines per pt reports       Current Scheduled Medications:   famotidine, 20 mg, Intravenous, Q12H  FLUoxetine, 20 mg, Nasogastric, Nightly  fluticasone, 2 spray, Each Nare, BID  folic acid, 1 mg, Nasogastric, Daily  levETIRAcetam, 500 mg, Intravenous, Q12H  sodium bicarbonate, 650 mg, Nasogastric, BID  sodium chloride, 10 mL, Intravenous, Q12H      Current PRN Medications:  hold  •  hydrALAZINE  •  labetalol  •  Morphine  •  ondansetron **OR** ondansetron  •  potassium chloride **OR** potassium chloride **OR** potassium chloride  •  potassium phosphate infusion greater than 15 mMoles **OR** potassium phosphate infusion greater than 15 mMoles **OR** potassium phosphate **OR** sodium phosphate IVPB **OR** sodium phosphate IVPB **OR** sodium phosphate IVPB  •  sodium chloride  •  [COMPLETED] Insert peripheral IV **AND** sodium chloride  •  [COMPLETED] Insert peripheral IV **AND** sodium chloride  •  sodium chloride    Review of Systems   Unable to perform ROS: Intubated       Objective     Vital Signs:  Temp (24hrs), Av.8 °F (36.6 °C), Min:96.7 °F (35.9 °C), Max:98.7 °F (37.1 °C)      /76   Pulse 89   Temp 98.7 °F (37.1 °C) (Axillary)   Resp 16   Ht 170.2 cm (67\")   Wt 70 kg (154 lb 6.4 oz)   SpO2 100%   BMI 24.18 kg/m²         Physical Exam   General: Patient is acutely ill-appearing lying in bed in the CCU  Neck: Completely supple  HEENT: ET tube " is in place.  OG tube is in place.  Respiratory: Effort even and unlabored.  FiO2 30% PEEP of 5 O2 sats %  Neuro: Sedated on propofol  Psych: Sedated on propofol      Line/IV site:  Central line  right IJ      Results Review:    I reviewed the patient's new clinical results.    Lab Results:  CBC:   Lab Results   Lab 04/16/22  0200 04/16/22  1511 04/17/22  0732 04/17/22  1417 04/18/22  0231 04/19/22  0329 04/20/22  0417 04/21/22  0158   WBC 15.70*  --  12.14* 12.54* 11.08* 10.38 8.27 8.95   HEMOGLOBIN 4.8* 9.0* 6.6* 8.8* 7.9* 8.8* 7.4* 7.7*   HEMATOCRIT 15.8* 26.3* 19.4* 25.1* 23.5* 25.9* 22.4* 23.7*   PLATELETS 301  --  161 162 185 214 218 292   LYMPHOCYTE %  --   --   --   --  4.0*  --   --   --    MONOCYTES %  --   --   --   --  5.1  --   --   --            CMP:     Lab Results   Lab 04/19/22  0329 04/19/22  1650 04/20/22  0417 04/21/22  0158   SODIUM 142  --  142 148*   POTASSIUM 3.6 4.5 3.7 3.9   CHLORIDE 108*  --  111* 114*   CO2 24.0  --  24.0 25.0   BUN 6  --  5* 6   CREATININE 0.91  --  0.71 0.77   CALCIUM 8.7  --  8.3* 7.9*   BILIRUBIN 0.6  --  0.5 0.5   ALK PHOS 88  --  78 83   ALT (SGPT) 21  --  20 17   AST (SGOT) 43*  --  46* 33*   GLUCOSE 91  --  79 82       Estimated Creatinine Clearance: 103 mL/min (by C-G formula based on SCr of 0.77 mg/dL).    Culture Results:    Microbiology Results (last 10 days)     Procedure Component Value - Date/Time    Culture, CSF - Cerebrospinal Fluid, Lumbar Puncture [[id number]] Collected: 04/17/22 1239    Lab Status: Final result Specimen: Cerebrospinal Fluid from Lumbar Puncture Updated: 04/20/22 0535     CSF Culture No growth at 3 days     Gram Stain No WBCs or organisms seen    Anaerobic Culture - Cerebrospinal Fluid, Spine, Lumbar [[id number]]  (Normal) Collected: 04/17/22 1239    Lab Status: Preliminary result Specimen: Cerebrospinal Fluid from Spine, Lumbar Updated: 04/20/22 0609     Anaerobic Culture No anaerobes isolated at 3 days    AFB Culture -  Cerebrospinal Fluid, Lumbar Puncture [[id number]] Collected: 04/17/22 1239    Lab Status: Preliminary result Specimen: Cerebrospinal Fluid from Lumbar Puncture Updated: 04/18/22 1110     AFB Stain No acid fast bacilli seen on direct smear    Cryptococcal AG, CSF - Cerebrospinal Fluid, Lumbar Puncture [[id number]]  (Normal) Collected: 04/17/22 1239    Lab Status: Final result Specimen: Cerebrospinal Fluid from Lumbar Puncture Updated: 04/17/22 1403     Cryptococcal Antigen, CSF Negative    Meningitis / Encephalitis Panel, PCR - Cerebrospinal Fluid, Lumbar Puncture [[id number]]  (Normal) Collected: 04/17/22 1239    Lab Status: Final result Specimen: Cerebrospinal Fluid from Lumbar Puncture Updated: 04/18/22 1105     ESCHERICHIA COLI K1, PCR Not Detected     HAEMOPHILUS INFLUENZAE, PCR Not Detected     LISTERIA MONOCYTOGENES, PCR Not Detected     NEISSERIA MENINGITIDIS, PCR Not Detected     STREPTOCOCCUS AGALACTIAE, PCR Not Detected     STREPTOCOCCUS PNEUMONIAE, PCR Not Detected     CYTOMEGALOVIRUS (CMV), PCR Not Detected     ENTEROVIRUS, PCR Not Detected     HERPES SIMPLEX VIRUS 1 (HSV-1), PCR Not Detected     HERPES SIMPLEX VIRUS 2 (HSV-2), PCR Not Detected     HUMAN PARECHOVIRUS, PCR Not Detected     VARICELLA ZOSTER VIRUS (VZV), PCR Not Detected     CRYPTOCOCCUS NEOFORMANS / GATTII, PCR Not Detected     HUMAN HERPES VIRUS 6 PCR Not Detected    Narrative:      This test is performed by utilizing real time polymerace chain recation (PCR).   The [employer] ME Panel does not distinguish between latent and active CMV and HHV-6 infections. Detection of these viruses may indicate primary infection, secondary reactivation, or the presence of latent virus. Results should always be interpreted in conjunction with other clinical, laboratory, and epidemiological information.    Blood Culture - Blood, Arm, Left [[id number]]  (Normal) Collected: 04/16/22 0346    Lab Status: Final result Specimen: Blood from Arm, Left Updated:  04/21/22 0417     Blood Culture No growth at 5 days    Blood Culture - Blood, Arm, Left [[id number]]  (Normal) Collected: 04/16/22 0112    Lab Status: Final result Specimen: Blood from Arm, Left Updated: 04/21/22 0147     Blood Culture No growth at 5 days    Blood Culture With AASHISH - Blood, Arm, Right [[id number]]  (Normal) Collected: 04/14/22 1938    Lab Status: Final result Specimen: Blood from Arm, Right Updated: 04/19/22 2017     Blood Culture No growth at 5 days    Urine Culture - Urine, Urine, Catheter In/Out [[id number]]  (Abnormal)  (Susceptibility) Collected: 04/14/22 1025    Lab Status: Final result Specimen: Urine, Catheter In/Out Updated: 04/17/22 1129     Urine Culture >100,000 CFU/mL Klebsiella oxytoca ESBL     Comment: Consider infectious disease consult.  Susceptibility results may not correlate to clinical outcomes.       Susceptibility      Klebsiella oxytoca ESBL      SANDEEP      Amikacin Susceptible      Ertapenem Susceptible      Gentamicin Resistant     Levofloxacin Intermediate      Meropenem Susceptible      Nitrofurantoin Susceptible      Piperacillin + Tazobactam Susceptible      Tobramycin Intermediate      Trimethoprim + Sulfamethoxazole Susceptible                                      Radiology:   Imaging Results (Last 72 Hours)     Procedure Component Value Units Date/Time    XR Chest 1 View [[id number]] Collected: 04/20/22 1150     Updated: 04/20/22 1155    Narrative:      EXAMINATION: XR CHEST 1 VW- 4/20/2022 11:50 AM CDT     HISTORY: IJ placement; E87.6-Hypokalemia; N39.0-Urinary tract infection,  site not specified; R31.9-Hematuria, unspecified; R11.2-Nausea with  vomiting, unspecified; E87.2-Acidosis; R13.10-Dysphagia, unspecified;  A41.9-Sepsis, unspecified organism; N39.0-Urinary tract infection, site  not specified; Z78.9-Other specified health status.     REPORT: A frontal view of the chest was obtained.     COMPARISON: Chest x-ray 4/20/2022 0337 hours.     The endotracheal tube,  nasogastric tube and partially visualized  external left nephrostomy tube appear in satisfactory position as  before. There is a new right internal jugular central line, tip projects  over the cavoatrial junction in good position. No pneumothorax is  identified. There is mild volume loss in the lung bases and mild central  vascular crowding. Heart size is normal. No pleural effusion is  identified.       Impression:      Interval insertion of a right internal jugular central line,  in good position without pneumothorax. No other significant change is  identified.  This report was finalized on 04/20/2022 11:52 by Dr. Florian Laurent MD.    XR Chest 1 View [[id number]] Collected: 04/20/22 0723     Updated: 04/20/22 0727    Narrative:      HISTORY: Intubated     CXR: Frontal view the chest obtained     COMPARISON: 4/19/2022     FINDINGS: The endotracheal tube is slightly low-lying with the tip  position 1.5 cm above the jaime. Enteric tube tip in the fundus of the  stomach.     There are bibasilar densities, patchy atelectasis or possibly pneumonia.  No radiographic evidence of edema. No pleural effusion or pneumothorax.  Stable mediastinal contours.       Impression:      1. Slightly low-lying endotracheal tube. Enteric tube tip in the fundus  of stomach.  2. Bibasilar densities, slightly increased from yesterday's exam  represent patchy atelectasis, however early pneumonia considered.  This report was finalized on 04/20/2022 07:24 by Dr. Rina Ricardo MD.    XR Abdomen KUB [[id number]] Collected: 04/20/22 0714     Updated: 04/20/22 0718    Narrative:      EXAMINATION: KV radiograph 4/20/2022     HISTORY: Ileus.     FINDINGS: Today's exam is compared to previous study of 4 days earlier.  There is improving colonic and small bowel distention consistent with a  resolving ileus. A left-sided nephrostomy tube and ureteral stent are  stable in position from the previous exam. A NG tube remains in place.  There is no  evidence of free air.       Impression:      1.. Improving ileus with diminishing small bowel and colonic distention.  This report was finalized on 04/20/2022 07:15 by Dr. Yuniel De Jesus MD.    XR Chest 1 View [[id number]] Collected: 04/20/22 0711     Updated: 04/20/22 0716    Narrative:      EXAMINATION: Chest 1 view 4/19/2022     HISTORY: Tube exchange     FINDINGS: Upright frontal projection of the chest is compared to  previous exam of one day earlier. An endotracheal tube and NG tube  remain in place. There is elevation of the right diaphragm with mild  right basilar atelectasis. Lungs are otherwise clear. There is no  effusion or free air present.       Impression:      1.. No interval line changes.  2. Elevated right diaphragm with right basilar atelectasis.  This report was finalized on 04/20/2022 07:12 by Dr. Yuniel De Jesus MD.    US Venous Doppler Lower Extremity Bilateral (duplex) [[id number]] Collected: 04/19/22 1527     Updated: 04/19/22 1531    Narrative:      History: Swelling       Impression:      Impression: There is no evidence of deep venous thrombosis or  superficial thrombophlebitis of right or left lower extremities.     Comments: Bilateral lower extremity venous duplex exam was performed  using color Doppler flow, Doppler waveform analysis, and grayscale  imaging, with and without compression. There is no evidence of deep  venous thrombosis in the common femoral, superficial femoral, popliteal,  peroneal, anterior tibial, and posterior tibial veins bilaterally. No  thrombus is identified in the saphenofemoral junctions and greater  saphenous veins bilaterally.         This report was finalized on 04/19/2022 15:27 by Dr. Robin Kam MD.    XR Chest 1 View [[id number]] Collected: 04/19/22 0839     Updated: 04/19/22 0845    Narrative:      EXAMINATION: XR CHEST 1 VW- 4/19/2022 8:39 AM CDT     HISTORY: vent check; E87.6-Hypokalemia; N39.0-Urinary tract infection,  site not specified;  R31.9-Hematuria, unspecified; R11.2-Nausea with  vomiting, unspecified; E87.2-Acidosis; R13.10-Dysphagia, unspecified;  A41.9-Sepsis, unspecified organism; N39.0-Urinary tract infection, site  not specified.     REPORT: A frontal view of the chest was obtained.     COMPARISON: Chest x-ray 4/18/2022 1300 hours.     Endotracheal tube projects over the midline trachea with tip  approximately 15 mm superior to the jaime, satisfactory and stable. The  tip of the NG tube projects over the gastric fundus as before. There is  partial visualization of the internal and external left nephrostomy  tube, oriented vertically over the left lower chest and upper abdomen.  There is mild atelectasis in the lung bases greater on the left. No lung  consolidation is identified. There is no pneumothorax or pleural  effusion. No acute osseous findings. Mild levoscoliosis of the thoracic  spine.       Impression:      Stable 1 day appearance of the chest.  This report was finalized on 04/19/2022 08:42 by Dr. Florian Laurent MD.    MRI Brain With & Without Contrast [[id number]] Collected: 04/18/22 1738     Updated: 04/18/22 1752    Narrative:      HISTORY: Unresponsive, improving kidney function     MRI BRAIN: Multiplanar imaging the brain performed pre- and post-IV  contrast.     COMPARISON: Nonenhanced MRI brain 4/16/2022     FINDINGS: There is hyperintense T2 FLAIR signal symmetrically along the  bilateral frontal parietal and occipital lobes. Increased signal on  diffusion-weighted images most likely T2 shine through artifact there is  no abnormal enhancement identified. Minimal hyperintense FLAIR signal  within the periventricular regions with few hyperintense punctate FLAIR  signal within the subcortical frontal lobes. No abnormal extra-axial  fluid collection.     Limited assessment of the orbits and base of skull unremarkable.       Impression:      1. No abnormal enhancement identified within the region of hyperintense  T2/FLAIR  signal involving the bilateral frontal,parietal and occipital  cortical regions. 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 No abnormal enhancement identified.  This report was finalized on 04/18/2022 17:49 by Dr. Rina Ricardo MD.    XR Chest 1 View [[id number]] Collected: 04/18/22 1403     Updated: 04/18/22 1410    Narrative:      EXAMINATION: XR CHEST 1 VW- 4/18/2022 2:03 PM CDT     HISTORY: Intubation; E87.6-Hypokalemia; N39.0-Urinary tract infection,  site not specified; R31.9-Hematuria, unspecified; R11.2-Nausea with  vomiting, unspecified; E87.2-Acidosis; R13.10-Dysphagia, unspecified;  A41.9-Sepsis, unspecified organism; N39.0-Urinary tract infection, site  not specified.     REPORT: A frontal view the chest was obtained.     COMPARISON: Chest x-ray 2/20/2022.     The patient is now intubated, with endotracheal tube in satisfactory  position with tip approximately 1.4 cm superior to the jaime. An NG  tube has been inserted, tip is at the gastric fundus level. There is  partial visualization of the left-sided external/internal percutaneous  nephrostomy nephrostomy catheter, the tip is shown to be over the  midline exterior to the patient on previous CT of the abdomen on  4/15/2022. There is mild atelectasis in the left lung base. No lung  consolidation is identified. Heart size is normal. No pneumothorax or  pleural effusion is identified.       Impression:      Satisfactory position of the endotracheal tube and NG tube.  Mild volume loss in the lung bases, greater on the left. No lung  consolidation is identified. There is no pneumothorax or pleural  effusion.        This report was finalized on 04/18/2022 14:07 by Dr. Florian Laurent MD.          Assessment/Plan     Active Hospital Problems    Diagnosis    • **Intractable nausea and vomiting    • Severe malnutrition (CMS/HCC)    • Hypophosphatemia    • Hypokalemia    • UTI (urinary tract infection), bacterial  "   • Malfunction of nephrostomy tube (HCC)    • Lactic acidosis    • Sepsis secondary to UTI (HCC)    • Essential (primary) hypertension        IMPRESSION:    1. Acute respiratory on the vent-understanding and she was placed in the vent for airway protection in order to get an MRI of the brain.  She is on minimal ventilator settings  2. History of necrotizing urinary tract infection previous admission now with urinary tract infection with ESBL Klebsiella -in and out cath collection per computer.  Completed 7-day ertapenem course April 20  3. Leukocytosis- resolved  4. Acute blood loss anemia thought secondary to left thigh hematoma-Per report of CT \"suspicious for abscess in the left abductor katie muscle\"- Dr. Scott following.  No concern for abscess clinically at this time.   5. Abnormal MRI of brain.  6. Blood culture with coagulase-negative staph-contaminant    RECOMMENDATION:   · Monitor patient closely for healthcare associated infection and urinary tract infection given history  · Ventilator management per hospitalist  · Hemodynamic management per hospitalist  · Monitor hemoglobin closely especially given recent blood loss.          Rachael Foy MD  04/21/22  08:30 CDT      "

## 2022-04-21 NOTE — PROGRESS NOTES
Neurology Progress Note      Date of admission: 4/2/2022  8:46 PM  Date of visit: 4/21/2022    Chief Complaint:  F/u encephalopathy    Subjective     Subjective:  No events overnight.  The patient remains sedated and intubated.  We have yet to receive confirmation the [facility] is ready for the transfer.  Her  is at the bedside today I spoke with him about the imaging findings.  Medications:  Current Facility-Administered Medications   Medication Dose Route Frequency Provider Last Rate Last Admin   • famotidine (PEPCID) injection 20 mg  20 mg Intravenous Q12H Amado Villarreal MD   20 mg at 04/20/22 2009   • FLUoxetine (PROzac) capsule 20 mg  20 mg Nasogastric Nightly Brody Potter MD   20 mg at 04/20/22 2009   • fluticasone (FLONASE) 50 MCG/ACT nasal spray 2 spray  2 spray Each Nare BID Frank Ricks MD   2 spray at 04/20/22 2010   • folic acid (FOLVITE) tablet 1 mg  1 mg Nasogastric Daily Brody Potter MD   1 mg at 04/20/22 2009   • Hold medication  1 each Does not apply Continuous PRN Abiola Yan MD       • hydrALAZINE (APRESOLINE) injection 10 mg  10 mg Intravenous Q4H PRN Amado Villarreal MD       • labetalol (NORMODYNE,TRANDATE) injection 10 mg  10 mg Intravenous Q4H PRN Amado Villarreal MD   10 mg at 04/10/22 1518   • lactated ringers infusion  75 mL/hr Intravenous Continuous Amado Villarreal MD 75 mL/hr at 04/21/22 0800 75 mL/hr at 04/21/22 0800   • levETIRAcetam in NaCl 0.82% (KEPPRA) IVPB 500 mg  500 mg Intravenous Q12H Abiola Yan MD   500 mg at 04/20/22 2009   • Morphine sulfate (PF) injection 2 mg  2 mg Intravenous Q4H PRN Amado Villarreal MD       • norepinephrine (LEVOPHED) 8 mg in 250 mL NS infusion (premix)  0.02-0.3 mcg/kg/min Intravenous Titrated Brody Potter MD   Stopped at 04/18/22 1715   • ondansetron (ZOFRAN) tablet 4 mg  4 mg Oral Q6H PRN Frank Ricks MD        Or   • ondansetron (ZOFRAN) injection 4 mg  4 mg  Intravenous Q6H PRN Frank Ricks MD   4 mg at 04/16/22 1000   • potassium chloride (MICRO-K) CR capsule 40 mEq  40 mEq Oral PRN Amado Villarreal MD        Or   • potassium chloride (KLOR-CON) packet 40 mEq  40 mEq Oral PRN Amado Villarreal MD        Or   • potassium chloride 10 mEq in 100 mL IVPB  10 mEq Intravenous Q1H PRN Amado Villarreal  mL/hr at 04/19/22 1046 10 mEq at 04/19/22 1046   • potassium phosphate 45 mmol in sodium chloride 0.9 % 500 mL infusion  45 mmol Intravenous PRN Amado Villarreal MD        Or   • potassium phosphate 30 mmol in sodium chloride 0.9 % 250 mL infusion  30 mmol Intravenous PRN Amado Villarreal MD 31.3 mL/hr at 04/16/22 1900 30 mmol at 04/16/22 1900    Or   • Potassium Phosphates 15 mmol in sodium chloride 0.9 % 100 mL infusion  15 mmol Intravenous PRN Amado Villarreal MD        Or   • sodium phosphates 45 mmol in sodium chloride 0.9 % 500 mL IVPB  45 mmol Intravenous PRN Amado Vilalrreal MD        Or   • sodium phosphates 30 mmol in sodium chloride 0.9 % 250 mL IVPB  30 mmol Intravenous PRN Amado Villarreal MD        Or   • sodium phosphates 15 mmol in sodium chloride 0.9 % 250 mL IVPB  15 mmol Intravenous PRN Amado Villarreal MD       • propofol (DIPRIVAN) infusion 10 mg/mL 100 mL  5-50 mcg/kg/min Intravenous Titrated Brody Potter MD 8.65 mL/hr at 04/21/22 0800 20 mcg/kg/min at 04/21/22 0800   • sodium bicarbonate tablet 650 mg  650 mg Nasogastric BID Brody Potter MD   650 mg at 04/20/22 2009   • sodium chloride 0.9 % flush 10 mL  10 mL Intravenous PRN Frank Ricks MD       • sodium chloride 0.9 % flush 10 mL  10 mL Intravenous PRN Frank Ricks MD       • sodium chloride 0.9 % flush 10 mL  10 mL Intravenous PRN Frank Ricks MD       • sodium chloride 0.9 % flush 10 mL  10 mL Intravenous Q12H Frank Ricks MD   10 mL at 04/20/22 2009   • sodium chloride 0.9 % flush 10 mL  10 mL Intravenous PRN Frank Ricks MD   10 mL at 04/10/22  1518       Review of Systems:   -A 14 point review of systems is unobtainable as she is unresponsive  Objective     Objective      Vital Signs  Temp:  [96.7 °F (35.9 °C)-98.7 °F (37.1 °C)] 98.6 °F (37 °C)  Heart Rate:  [] 89  Resp:  [16] 16  BP: ()/(56-91) 109/76  FiO2 (%):  [30 %] 30 %    Physical Exam:    HEENT:  Neck supple  CVS:  Regular rate and rhythm.  No murmurs  Carotid Examination:  No bruits  Lungs:  Clear to auscultation  Abdomen:  Non-tender, Non-distended  Extremities:  No signs of peripheral edema    Neurologic Exam:  Propofol held 25 minutes before examining    -Unresponsive and intubated    -Does not follows any commands    Cranial nerves II through XII intact.  Pupils react  No response to visual threat  No doll eyes  Grimaces  And frontalis symmetric  Minimal movement lower face bilateral  Seems to hear  Breathes above vent and coughs when suctioned    Motor: (strength out of 5:  1= minimal movement, 2 = movement in plane of gravity, 3 = movement against gravity, 4 = movement against some resistance, 5 = full strength)    Withdr follow-up with buttons are really gained a lot of tract    DTR:  3+ throughout in upper extremities  Absent in lower extremities    Sensory:  Only responds to noxious stimuli    Coordination/Gait:  -No spontaneous movement to assess     Results Review:    I reviewed the patient's new clinical results.    Lab Results (last 24 hours)     Procedure Component Value Units Date/Time    POC Glucose Once [[id number]]  (Normal) Collected: 04/21/22 0553    Specimen: Blood Updated: 04/21/22 0606     Glucose 92 mg/dL      Comment: : [id number] Franky LoriMeter ID: [id number]       Blood Culture - Blood, Arm, Left [[id number]]  (Normal) Collected: 04/16/22 0346    Specimen: Blood from Arm, Left Updated: 04/21/22 0417     Blood Culture No growth at 5 days    Comprehensive Metabolic Panel [[id number]]  (Abnormal) Collected: 04/21/22 0158    Specimen: Blood Updated: 04/21/22  0234     Glucose 82 mg/dL      BUN 6 mg/dL      Creatinine 0.77 mg/dL      Sodium 148 mmol/L      Potassium 3.9 mmol/L      Comment: Slight hemolysis detected by analyzer. Results may be affected.        Chloride 114 mmol/L      CO2 25.0 mmol/L      Calcium 7.9 mg/dL      Total Protein 4.0 g/dL      Albumin 1.80 g/dL      ALT (SGPT) 17 U/L      AST (SGOT) 33 U/L      Alkaline Phosphatase 83 U/L      Total Bilirubin 0.5 mg/dL      Globulin 2.2 gm/dL      A/G Ratio 0.8 g/dL      BUN/Creatinine Ratio 7.8     Anion Gap 9.0 mmol/L      eGFR 97.7 mL/min/1.73      Comment: National Kidney Foundation and American Society of Nephrology (ASN) Task Force recommended calculation based on the Chronic Kidney Disease Epidemiology Collaboration (CKD-EPI) equation refit without adjustment for race.       Narrative:      GFR Normal >60  Chronic Kidney Disease <60  Kidney Failure <15      CBC & Differential [[id number]]  (Abnormal) Collected: 04/21/22 0158    Specimen: Blood Updated: 04/21/22 0218    Narrative:      The following orders were created for panel order CBC & Differential.  Procedure                               Abnormality         Status                     ---------                               -----------         ------                     CBC Auto Differential[[id number]]        Abnormal            Final result                 Please view results for these tests on the individual orders.    CBC Auto Differential [[id number]]  (Abnormal) Collected: 04/21/22 0158    Specimen: Blood Updated: 04/21/22 0218     WBC 8.95 10*3/mm3      RBC 2.62 10*6/mm3      Hemoglobin 7.7 g/dL      Hematocrit 23.7 %      MCV 90.5 fL      MCH 29.4 pg      MCHC 32.5 g/dL      RDW 16.4 %      RDW-SD 48.5 fl      MPV 9.5 fL      Platelets 292 10*3/mm3      Neutrophil % 66.5 %      Lymphocyte % 13.6 %      Monocyte % 8.8 %      Eosinophil % 5.9 %      Basophil % 0.6 %      Immature Grans % 4.6 %      Neutrophils, Absolute 5.95 10*3/mm3       Lymphocytes, Absolute 1.22 10*3/mm3      Monocytes, Absolute 0.79 10*3/mm3      Eosinophils, Absolute 0.53 10*3/mm3      Basophils, Absolute 0.05 10*3/mm3      Immature Grans, Absolute 0.41 10*3/mm3      nRBC 0.0 /100 WBC     Blood Culture - Blood, Arm, Left [[id number]]  (Normal) Collected: 04/16/22 0112    Specimen: Blood from Arm, Left Updated: 04/21/22 0147     Blood Culture No growth at 5 days    POC Glucose Once [[id number]]  (Normal) Collected: 04/21/22 0030    Specimen: Blood Updated: 04/21/22 0051     Glucose 82 mg/dL      Comment: : [id number] [place] SarahMeter ID: [id number]       POC Glucose Once [[id number]]  (Normal) Collected: 04/20/22 1734    Specimen: Blood Updated: 04/20/22 1746     Glucose 75 mg/dL      Comment: : [id number] Birnikhil KatelynMeter ID: [id number]       POC Glucose Once [[id number]]  (Normal) Collected: 04/20/22 1235    Specimen: Blood Updated: 04/20/22 1246     Glucose 72 mg/dL      Comment: : [id number] Reasor NathanMeter ID: [id number]           Imaging Results (Last 24 Hours)     Procedure Component Value Units Date/Time    XR Chest 1 View [[id number]] Collected: 04/20/22 1150     Updated: 04/20/22 1155    Narrative:      EXAMINATION: XR CHEST 1 VW- 4/20/2022 11:50 AM CDT     HISTORY: IJ placement; E87.6-Hypokalemia; N39.0-Urinary tract infection,  site not specified; R31.9-Hematuria, unspecified; R11.2-Nausea with  vomiting, unspecified; E87.2-Acidosis; R13.10-Dysphagia, unspecified;  A41.9-Sepsis, unspecified organism; N39.0-Urinary tract infection, site  not specified; Z78.9-Other specified health status.     REPORT: A frontal view of the chest was obtained.     COMPARISON: Chest x-ray 4/20/2022 0337 hours.     The endotracheal tube, nasogastric tube and partially visualized  external left nephrostomy tube appear in satisfactory position as  before. There is a new right internal jugular central line, tip projects  over the cavoatrial junction in good position. No  pneumothorax is  identified. There is mild volume loss in the lung bases and mild central  vascular crowding. Heart size is normal. No pleural effusion is  identified.       Impression:      Interval insertion of a right internal jugular central line,  in good position without pneumothorax. No other significant change is  identified.  This report was finalized on 04/20/2022 11:52 by Dr. Florian Laurent MD.          CSF:  Meningitis/encephalitis panel neg  -Red 4, Whites 1  -Protein:  34.5  -Glucose: 65  -Paraneoplastic pending    EEG:  slowing    Assessment/Plan     Hospital Problem List      Intractable nausea and vomiting    Sepsis secondary to UTI (HCC)    Lactic acidosis    Hypokalemia    Essential (primary) hypertension    UTI (urinary tract infection), bacterial    Malfunction of nephrostomy tube (HCC)    Severe malnutrition (CMS/HCC)    Hypophosphatemia    Impression:  · Altered mentation  · Etiology of altered mentation and MRI findings is unknown.  The MRI does look consistent with an anoxic brain injury but there are no evidence to explain this.  · Hypotension requiring pressors  · Currently off of pressors  · Anemia  · Urinary Tract infection  · Failure to thrive  · S/p COVID  Plan:  · CSF studies unremarkable thus far  · MRI consistent with diffuse anoxic injury despite no definitive event.    · She has been accepted at  Neurology once a bed is available  · Updated  at bedside        Taj Payne MD  04/21/22  09:08 CDT

## 2022-04-21 NOTE — SIGNIFICANT NOTE
04/21/22 1049   Readings   ETCO2 (mmHg) 28 mmHg   Resp Rate (Observed) Vent 15   I:E Ratio (Obs) 1:2.70   Vt, Exp (observed, mL) 491 mL   Minute Ventilation (L/min) (Obs) 6.84 L/min   PIP Observed (cm H2O) 20 cm H2O   MAP (cm H2O) 8.3   Plateau Pressure (cm H2O) 16 cm H2O   Dynamic Compliance (L/cm H2O) 41 L/cm H2O

## 2022-04-21 NOTE — CASE MANAGEMENT/SOCIAL WORK
Continued Stay Note  [facility]     Patient Name: Namita Zabala  MRN: [id number]  Today's Date: 4/21/2022    Admit Date: 4/2/2022     Discharge Plan     Row Name 04/21/22 1000       Plan    Plan pending transfer to     Plan Comments SW contacted  transfer center this am [phone], spoke with PA, Claudia Moss, who advised patient remains on waiting list.   transfer center will contact [facility] directly at [phone] when a bed becomes available.               Discharge Codes    No documentation.                     ALONSO GonzalezW

## 2022-04-21 NOTE — PAYOR COMM NOTE
"Namita Cooper MARCELO (46 y.o. Female) [id number]  Cont stay  CCU   [facility] phone    Fax                Date of Birth   1975    Social Security Number       Address   [address] [place], [place] [address]    Home Phone   [phone]    MRN   [id number]       Taoism   Yazdanism    Marital Status                               Admission Date   4/2/22    Admission Type   Emergency    Admitting Provider   Brody Potter MD    Attending Provider   Brody Potter MD    Department, Room/Bed   [facility] CARDIAC CARE, C006/1       Discharge Date       Discharge Disposition       Discharge Destination                               Attending Provider: Brody Potter MD    Allergies: Coconut, Nuts, Penicillins, Turkey    Isolation: Contact   Infection: COVID (History) (09/15/21), ESBL Klebsiella (04/07/22)   Code Status: CPR   Advance Care Planning Activity    Ht: 170.2 cm (67\")   Wt: 70 kg (154 lb 6.4 oz)    Admission Cmt: None   Principal Problem: Intractable nausea and vomiting [R11.2]                 Active Insurance as of 4/2/2022     Primary Coverage     Payor Plan Insurance Group Employer/Plan Group    ANTHEM BLUE CROSS ANTHEM BLUE CROSS BLUE SHIELD PPO [id number]     Payor Plan Address Payor Plan Phone Number Payor Plan Fax Number Effective Dates    [address] [phone]  1/1/2022 - None Entered    [facility] [id number]       Subscriber Name Subscriber Birth Date Member ID       BRANDON COOPER GEMA 1975 [id number]           Secondary Coverage     Payor Plan Insurance Group Employer/Plan Group    MEDICARE MEDICARE A & B      Payor Plan Address Payor Plan Phone Number Payor Plan Fax Number Effective Dates    [address] [phone]  7/1/2019 - None Entered    [facility] [id number]       Subscriber Name Subscriber Birth Date Member ID       NAMITA COOPER MARCELO 1975 [id number]                 Emergency " Contacts      (Rel.) Home Phone Work Phone Mobile Phone    Grant Zabala (Spouse) -- -- [phone]    Noel Johnson (Father) [phone] -- [phone]    Marquita Johnson (Mother) -- -- [phone]    Neida Zabala -- -- [phone]            Vital Signs (last day)     Date/Time Temp Temp src Pulse Resp BP Patient Position SpO2    04/21/22 1000 -- -- 90 -- 122/88 -- 100    04/21/22 0930 -- -- 90 -- 119/81 -- 100    04/21/22 0900 -- -- 92 -- 120/82 -- 100    04/21/22 0830 -- -- 87 -- 110/76 -- 100    04/21/22 0800 98.6 (37) Axillary 89 -- 109/76 -- 100    04/21/22 0700 -- -- 90 -- 108/74 -- 100    04/21/22 0600 -- -- 92 -- 102/68 -- 100    04/21/22 0500 -- -- 95 -- 112/78 -- 100    04/21/22 0400 -- -- 94 -- 100/75 -- 100    04/21/22 0346 98.7 (37.1) Axillary -- -- -- -- --    04/21/22 0300 -- -- 99 -- 100/70 -- 99    04/21/22 0200 -- -- 91 -- 98/67 -- 99    04/21/22 0100 -- -- 96 -- 106/75 -- 99    04/21/22 0000 98 (36.7) Axillary 98 -- 112/79 -- 99    04/20/22 2334 -- -- 98 -- -- -- 98    04/20/22 2300 -- -- 93 -- 108/78 -- 99    04/20/22 2200 -- -- 97 -- 105/77 -- 99    04/20/22 2100 -- -- 99 -- 114/79 -- 99    04/20/22 2000 -- -- 101 -- 102/80 -- 99    04/20/22 1931 97.7 (36.5) Axillary -- -- -- -- --    04/20/22 1900 -- -- 93 -- 105/67 -- 98    04/20/22 1745 -- -- 97 -- 109/73 -- 99    04/20/22 1730 -- -- 98 -- 109/69 -- 99    04/20/22 1715 -- -- 97 -- 106/66 -- 98    04/20/22 1700 -- -- 102 -- 114/70 -- 99    04/20/22 1645 -- -- 100 -- 112/71 -- 99    04/20/22 1630 -- -- 100 -- 119/74 -- 99    04/20/22 1615 -- -- 98 -- 107/76 -- 99    04/20/22 1600 -- -- 105 -- 116/84 -- 100    04/20/22 1545 -- -- 98 -- 103/67 -- 100    04/20/22 1530 -- -- 95 -- 103/74 -- 100    04/20/22 1515 98.1 (36.7) Axillary 96 16 101/67 -- 100    04/20/22 1445 -- -- 93 -- 101/67 -- 100    04/20/22 1430 -- -- 92 -- 101/65 -- 99    04/20/22 1415 -- -- 95 -- 93/68 -- 100    04/20/22 1400 -- -- 98 -- 100/56 -- 100     04/20/22 1345 -- -- 95 -- 90/63 -- 100    04/20/22 1330 -- -- 99 -- 94/57 -- 99    04/20/22 1315 -- -- 97 -- 100/66 -- 99    04/20/22 1300 -- -- 92 -- 102/67 -- 100    04/20/22 1245 -- -- 90 -- 135/91 -- 98    04/20/22 1230 -- -- 91 -- 115/78 -- 96    04/20/22 1215 -- -- 92 -- 121/88 -- 96    04/20/22 1200 -- -- 93 -- 119/83 -- 96    04/20/22 1145 -- -- 93 -- 112/90 -- 98    04/20/22 1130 96.7 (35.9) Axillary 91 -- 118/84 -- 98    04/20/22 1115 -- -- 94 -- 141/86 -- 94    04/20/22 1100 -- -- 93 -- 116/83 -- 98    04/20/22 1045 -- -- 91 -- 132/78 -- 97    04/20/22 1030 -- -- 89 -- 118/75 -- 96    04/20/22 1025 -- -- 90 16 -- -- 99    04/20/22 1015 -- -- 89 -- 131/77 -- 100    04/20/22 1000 -- -- 88 -- 105/74 -- 97    04/20/22 0945 -- -- 87 -- 112/75 -- 96    04/20/22 0930 -- -- 89 -- 101/76 -- 96    04/20/22 0915 -- -- 87 -- 103/68 -- 95    04/20/22 0900 -- -- 90 -- 97/70 -- 97    04/20/22 0845 -- -- 88 -- 96/72 -- 100    04/20/22 0830 -- -- 85 -- 98/66 -- 100    04/20/22 0815 -- -- 89 -- 126/91 -- 100    04/20/22 0800 96.5 (35.8) Axillary 91 -- 109/74 -- 100    04/20/22 0745 -- -- 88 -- 130/91 -- 100    04/20/22 0623 -- -- 82 15 -- -- 100    04/20/22 0600 -- -- 80 -- 100/73 -- --    04/20/22 0500 -- -- 88 -- 93/66 -- 100    04/20/22 0400 96.9 (36.1) Axillary 91 -- -- -- 98    04/20/22 0300 -- -- 90 -- 115/70 -- 100    04/20/22 0200 -- -- 95 -- 107/78 -- 100    04/20/22 0100 -- -- 93 -- 104/73 -- 100    04/20/22 0000 96.9 (36.1) Axillary 92 -- 116/87 -- 100          Current Facility-Administered Medications   Medication Dose Route Frequency Provider Last Rate Last Admin   • famotidine (PEPCID) injection 20 mg  20 mg Intravenous Q12H Amado Villarreal MD   20 mg at 04/21/22 0934   • FLUoxetine (PROzac) capsule 20 mg  20 mg Nasogastric Nightly Brody Potter MD   20 mg at 04/20/22 2009   • fluticasone (FLONASE) 50 MCG/ACT nasal spray 2 spray  2 spray Each Nare BID Frank Ricks MD   2 spray at 04/21/22 0908    • folic acid (FOLVITE) tablet 1 mg  1 mg Nasogastric Daily Brody Potter MD   1 mg at 04/20/22 2009   • Hold medication  1 each Does not apply Continuous PRN Abiola Yan MD       • hydrALAZINE (APRESOLINE) injection 10 mg  10 mg Intravenous Q4H PRN Amado Villarreal MD       • labetalol (NORMODYNE,TRANDATE) injection 10 mg  10 mg Intravenous Q4H PRN Amado Villarreal MD   10 mg at 04/10/22 1518   • lactated ringers infusion  75 mL/hr Intravenous Continuous Amado Villarreal MD 75 mL/hr at 04/21/22 0800 75 mL/hr at 04/21/22 0800   • levETIRAcetam in NaCl 0.82% (KEPPRA) IVPB 500 mg  500 mg Intravenous Q12H Abiola Yan MD   500 mg at 04/21/22 0934   • Morphine sulfate (PF) injection 2 mg  2 mg Intravenous Q4H PRN Amado Villarrela MD       • norepinephrine (LEVOPHED) 8 mg in 250 mL NS infusion (premix)  0.02-0.3 mcg/kg/min Intravenous Titrated Brody Potter MD   Stopped at 04/18/22 1715   • ondansetron (ZOFRAN) tablet 4 mg  4 mg Oral Q6H PRN Frank Ricks MD        Or   • ondansetron (ZOFRAN) injection 4 mg  4 mg Intravenous Q6H PRN Frank Ricks MD   4 mg at 04/16/22 1000   • potassium chloride (MICRO-K) CR capsule 40 mEq  40 mEq Oral PRN Amado Villarreal MD        Or   • potassium chloride (KLOR-CON) packet 40 mEq  40 mEq Oral PRN Amado Villarreal MD        Or   • potassium chloride 10 mEq in 100 mL IVPB  10 mEq Intravenous Q1H PRN Amado Villarreal  mL/hr at 04/19/22 1046 10 mEq at 04/19/22 1046   • potassium phosphate 45 mmol in sodium chloride 0.9 % 500 mL infusion  45 mmol Intravenous PRN Amado Villarreal MD        Or   • potassium phosphate 30 mmol in sodium chloride 0.9 % 250 mL infusion  30 mmol Intravenous PRN Amado Villarreal MD 31.3 mL/hr at 04/16/22 1900 30 mmol at 04/16/22 1900    Or   • Potassium Phosphates 15 mmol in sodium chloride 0.9 % 100 mL infusion  15 mmol Intravenous PRN Amado Villarreal MD        Or   • sodium phosphates 45 mmol in sodium chloride  0.9 % 500 mL IVPB  45 mmol Intravenous PRN Amado Villarreal MD        Or   • sodium phosphates 30 mmol in sodium chloride 0.9 % 250 mL IVPB  30 mmol Intravenous PRN Amado Villarreal MD        Or   • sodium phosphates 15 mmol in sodium chloride 0.9 % 250 mL IVPB  15 mmol Intravenous PRN Amado Villarreal MD       • propofol (DIPRIVAN) infusion 10 mg/mL 100 mL  5-50 mcg/kg/min Intravenous Titrated Brody Potter MD 8.65 mL/hr at 04/21/22 0911 20 mcg/kg/min at 04/21/22 0911   • sodium bicarbonate tablet 650 mg  650 mg Nasogastric BID Brody Potter MD   650 mg at 04/21/22 0934   • sodium chloride 0.9 % flush 10 mL  10 mL Intravenous PRN Frank Ricks MD       • sodium chloride 0.9 % flush 10 mL  10 mL Intravenous PRN Frank Ricks MD       • sodium chloride 0.9 % flush 10 mL  10 mL Intravenous PRN Frank Ricks MD       • sodium chloride 0.9 % flush 10 mL  10 mL Intravenous Q12H Frank Ricks MD   10 mL at 04/21/22 0911   • sodium chloride 0.9 % flush 10 mL  10 mL Intravenous PRN Frank Ricks MD   10 mL at 04/10/22 1518        Physician Progress Notes (last 24 hours)      Taj Payne MD at 04/21/22 0908            Neurology Progress Note      Date of admission: 4/2/2022  8:46 PM  Date of visit: 4/21/2022    Chief Complaint:  F/u encephalopathy    Subjective     Subjective:  No events overnight.  The patient remains sedated and intubated.  We have yet to receive confirmation the [facility] is ready for the transfer.  Her  is at the bedside today I spoke with him about the imaging findings.  Medications:  Current Facility-Administered Medications   Medication Dose Route Frequency Provider Last Rate Last Admin   • famotidine (PEPCID) injection 20 mg  20 mg Intravenous Q12H Amado Villarreal MD   20 mg at 04/20/22 2009   • FLUoxetine (PROzac) capsule 20 mg  20 mg Nasogastric Nightly Brody Potter MD   20 mg at 04/20/22 2009   • fluticasone (FLONASE) 50  MCG/ACT nasal spray 2 spray  2 spray Each Nare BID Frank Ricks MD   2 spray at 04/20/22 2010   • folic acid (FOLVITE) tablet 1 mg  1 mg Nasogastric Daily Brody Potter MD   1 mg at 04/20/22 2009   • Hold medication  1 each Does not apply Continuous PRN Abiola Yan MD       • hydrALAZINE (APRESOLINE) injection 10 mg  10 mg Intravenous Q4H PRN Amado Villarreal MD       • labetalol (NORMODYNE,TRANDATE) injection 10 mg  10 mg Intravenous Q4H PRN Amado Villarreal MD   10 mg at 04/10/22 1518   • lactated ringers infusion  75 mL/hr Intravenous Continuous Amado Villarreal MD 75 mL/hr at 04/21/22 0800 75 mL/hr at 04/21/22 0800   • levETIRAcetam in NaCl 0.82% (KEPPRA) IVPB 500 mg  500 mg Intravenous Q12H Abiola Yan MD   500 mg at 04/20/22 2009   • Morphine sulfate (PF) injection 2 mg  2 mg Intravenous Q4H PRN Amado Villarreal MD       • norepinephrine (LEVOPHED) 8 mg in 250 mL NS infusion (premix)  0.02-0.3 mcg/kg/min Intravenous Titrated Brody Potter MD   Stopped at 04/18/22 1715   • ondansetron (ZOFRAN) tablet 4 mg  4 mg Oral Q6H PRN Frank Ricks MD        Or   • ondansetron (ZOFRAN) injection 4 mg  4 mg Intravenous Q6H PRN Frank Ricks MD   4 mg at 04/16/22 1000   • potassium chloride (MICRO-K) CR capsule 40 mEq  40 mEq Oral PRN Amado Villarreal MD        Or   • potassium chloride (KLOR-CON) packet 40 mEq  40 mEq Oral PRN Amado Villarreal MD        Or   • potassium chloride 10 mEq in 100 mL IVPB  10 mEq Intravenous Q1H PRN Amado Villarreal  mL/hr at 04/19/22 1046 10 mEq at 04/19/22 1046   • potassium phosphate 45 mmol in sodium chloride 0.9 % 500 mL infusion  45 mmol Intravenous PRN Amado Villarreal MD        Or   • potassium phosphate 30 mmol in sodium chloride 0.9 % 250 mL infusion  30 mmol Intravenous PRN Amado Villarreal MD 31.3 mL/hr at 04/16/22 1900 30 mmol at 04/16/22 1900    Or   • Potassium Phosphates 15 mmol in sodium chloride 0.9 % 100 mL infusion  15  mmol Intravenous PRN Amado Villarreal MD        Or   • sodium phosphates 45 mmol in sodium chloride 0.9 % 500 mL IVPB  45 mmol Intravenous PRN Amado Villarreal MD        Or   • sodium phosphates 30 mmol in sodium chloride 0.9 % 250 mL IVPB  30 mmol Intravenous PRN Amado Villarreal MD        Or   • sodium phosphates 15 mmol in sodium chloride 0.9 % 250 mL IVPB  15 mmol Intravenous PRN Amado Villarreal MD       • propofol (DIPRIVAN) infusion 10 mg/mL 100 mL  5-50 mcg/kg/min Intravenous Titrated Brody Potter MD 8.65 mL/hr at 04/21/22 0800 20 mcg/kg/min at 04/21/22 0800   • sodium bicarbonate tablet 650 mg  650 mg Nasogastric BID Brody Potter MD   650 mg at 04/20/22 2009   • sodium chloride 0.9 % flush 10 mL  10 mL Intravenous PRN Frank Ricks MD       • sodium chloride 0.9 % flush 10 mL  10 mL Intravenous PRN Frank Ricks MD       • sodium chloride 0.9 % flush 10 mL  10 mL Intravenous PRN Frank Ricks MD       • sodium chloride 0.9 % flush 10 mL  10 mL Intravenous Q12H Frank Ricks MD   10 mL at 04/20/22 2009   • sodium chloride 0.9 % flush 10 mL  10 mL Intravenous PRN Frank Ricks MD   10 mL at 04/10/22 1518       Review of Systems:   -A 14 point review of systems is unobtainable as she is unresponsive  Objective     Objective      Vital Signs  Temp:  [96.7 °F (35.9 °C)-98.7 °F (37.1 °C)] 98.6 °F (37 °C)  Heart Rate:  [] 89  Resp:  [16] 16  BP: ()/(56-91) 109/76  FiO2 (%):  [30 %] 30 %    Physical Exam:    HEENT:  Neck supple  CVS:  Regular rate and rhythm.  No murmurs  Carotid Examination:  No bruits  Lungs:  Clear to auscultation  Abdomen:  Non-tender, Non-distended  Extremities:  No signs of peripheral edema    Neurologic Exam:  Propofol held 25 minutes before examining    -Unresponsive and intubated    -Does not follows any commands    Cranial nerves II through XII intact.  Pupils react  No response to visual threat  No doll eyes  Grimaces  And frontalis  symmetric  Minimal movement lower face bilateral  Seems to hear  Breathes above vent and coughs when suctioned    Motor: (strength out of 5:  1= minimal movement, 2 = movement in plane of gravity, 3 = movement against gravity, 4 = movement against some resistance, 5 = full strength)    Withdr follow-up with buttons are really gained a lot of tract    DTR:  3+ throughout in upper extremities  Absent in lower extremities    Sensory:  Only responds to noxious stimuli    Coordination/Gait:  -No spontaneous movement to assess     Results Review:    I reviewed the patient's new clinical results.    Lab Results (last 24 hours)     Procedure Component Value Units Date/Time    POC Glucose Once [[id number]]  (Normal) Collected: 04/21/22 0553    Specimen: Blood Updated: 04/21/22 0606     Glucose 92 mg/dL      Comment: : [id number] Franky LoriMeter ID: [id number]       Blood Culture - Blood, Arm, Left [[id number]]  (Normal) Collected: 04/16/22 0346    Specimen: Blood from Arm, Left Updated: 04/21/22 0417     Blood Culture No growth at 5 days    Comprehensive Metabolic Panel [[id number]]  (Abnormal) Collected: 04/21/22 0158    Specimen: Blood Updated: 04/21/22 0234     Glucose 82 mg/dL      BUN 6 mg/dL      Creatinine 0.77 mg/dL      Sodium 148 mmol/L      Potassium 3.9 mmol/L      Comment: Slight hemolysis detected by analyzer. Results may be affected.        Chloride 114 mmol/L      CO2 25.0 mmol/L      Calcium 7.9 mg/dL      Total Protein 4.0 g/dL      Albumin 1.80 g/dL      ALT (SGPT) 17 U/L      AST (SGOT) 33 U/L      Alkaline Phosphatase 83 U/L      Total Bilirubin 0.5 mg/dL      Globulin 2.2 gm/dL      A/G Ratio 0.8 g/dL      BUN/Creatinine Ratio 7.8     Anion Gap 9.0 mmol/L      eGFR 97.7 mL/min/1.73      Comment: National Kidney Foundation and American Society of Nephrology (ASN) Task Force recommended calculation based on the Chronic Kidney Disease Epidemiology Collaboration (CKD-EPI) equation refit without adjustment  for race.       Narrative:      GFR Normal >60  Chronic Kidney Disease <60  Kidney Failure <15      CBC & Differential [[id number]]  (Abnormal) Collected: 04/21/22 0158    Specimen: Blood Updated: 04/21/22 0218    Narrative:      The following orders were created for panel order CBC & Differential.  Procedure                               Abnormality         Status                     ---------                               -----------         ------                     CBC Auto Differential[[id number]]        Abnormal            Final result                 Please view results for these tests on the individual orders.    CBC Auto Differential [[id number]]  (Abnormal) Collected: 04/21/22 0158    Specimen: Blood Updated: 04/21/22 0218     WBC 8.95 10*3/mm3      RBC 2.62 10*6/mm3      Hemoglobin 7.7 g/dL      Hematocrit 23.7 %      MCV 90.5 fL      MCH 29.4 pg      MCHC 32.5 g/dL      RDW 16.4 %      RDW-SD 48.5 fl      MPV 9.5 fL      Platelets 292 10*3/mm3      Neutrophil % 66.5 %      Lymphocyte % 13.6 %      Monocyte % 8.8 %      Eosinophil % 5.9 %      Basophil % 0.6 %      Immature Grans % 4.6 %      Neutrophils, Absolute 5.95 10*3/mm3      Lymphocytes, Absolute 1.22 10*3/mm3      Monocytes, Absolute 0.79 10*3/mm3      Eosinophils, Absolute 0.53 10*3/mm3      Basophils, Absolute 0.05 10*3/mm3      Immature Grans, Absolute 0.41 10*3/mm3      nRBC 0.0 /100 WBC     Blood Culture - Blood, Arm, Left [[id number]]  (Normal) Collected: 04/16/22 0112    Specimen: Blood from Arm, Left Updated: 04/21/22 0147     Blood Culture No growth at 5 days    POC Glucose Once [[id number]]  (Normal) Collected: 04/21/22 0030    Specimen: Blood Updated: 04/21/22 0051     Glucose 82 mg/dL      Comment: : [id number] Sharon Dubois ID: [id number]       POC Glucose Once [[id number]]  (Normal) Collected: 04/20/22 1734    Specimen: Blood Updated: 04/20/22 1746     Glucose 75 mg/dL      Comment: : [id number] Abdon Miller ID:  [id number]       POC Glucose Once [[id number]]  (Normal) Collected: 04/20/22 1235    Specimen: Blood Updated: 04/20/22 1246     Glucose 72 mg/dL      Comment: : [id number] Reasor NathanMeter ID: [id number]           Imaging Results (Last 24 Hours)     Procedure Component Value Units Date/Time    XR Chest 1 View [[id number]] Collected: 04/20/22 1150     Updated: 04/20/22 1155    Narrative:      EXAMINATION: XR CHEST 1 VW- 4/20/2022 11:50 AM CDT     HISTORY: IJ placement; E87.6-Hypokalemia; N39.0-Urinary tract infection,  site not specified; R31.9-Hematuria, unspecified; R11.2-Nausea with  vomiting, unspecified; E87.2-Acidosis; R13.10-Dysphagia, unspecified;  A41.9-Sepsis, unspecified organism; N39.0-Urinary tract infection, site  not specified; Z78.9-Other specified health status.     REPORT: A frontal view of the chest was obtained.     COMPARISON: Chest x-ray 4/20/2022 0337 hours.     The endotracheal tube, nasogastric tube and partially visualized  external left nephrostomy tube appear in satisfactory position as  before. There is a new right internal jugular central line, tip projects  over the cavoatrial junction in good position. No pneumothorax is  identified. There is mild volume loss in the lung bases and mild central  vascular crowding. Heart size is normal. No pleural effusion is  identified.       Impression:      Interval insertion of a right internal jugular central line,  in good position without pneumothorax. No other significant change is  identified.  This report was finalized on 04/20/2022 11:52 by Dr. Florian Laurent MD.          CSF:  Meningitis/encephalitis panel neg  -Red 4, Whites 1  -Protein:  34.5  -Glucose: 65  -Paraneoplastic pending    EEG:  slowing    Assessment/Plan     Hospital Problem List      Intractable nausea and vomiting    Sepsis secondary to UTI (HCC)    Lactic acidosis    Hypokalemia    Essential (primary) hypertension    UTI (urinary tract infection), bacterial     Malfunction of nephrostomy tube (HCC)    Severe malnutrition (CMS/HCC)    Hypophosphatemia    Impression:  · Altered mentation  · Etiology of altered mentation and MRI findings is unknown.  The MRI does look consistent with an anoxic brain injury but there are no evidence to explain this.  · Hypotension requiring pressors  · Currently off of pressors  · Anemia  · Urinary Tract infection  · Failure to thrive  · S/p COVID  Plan:  · CSF studies unremarkable thus far  · MRI consistent with diffuse anoxic injury despite no definitive event.    · She has been accepted at  Neurology once a bed is available  · Updated  at bedside        Taj Payne MD  04/21/22  09:08 CDT        Electronically signed by Taj Payne MD at 04/21/22 0916     Rachael Foy MD at 04/20/22 1246          INFECTIOUS DISEASES PROGRESS NOTE    Patient:  Namita Zabala  YOB: 1975  MRN: [id number]   Admit date: 4/2/2022   Admitting Physician: Brody Potter MD  Primary Care Physician: David Lara MD    Chief Complaint: Unobtainable from patient as she is  intubated      Interval History: Patient is underwent MRI.  This has been reviewed by Dr. Payne.  Appears consistent with anoxic injury however no anoxic episode is known to have occurred.    Notes reviewed.  Dr. Scott spoke with patient's  at bedside.  Plans are to begin tube feeding slowly today.  No clinical evidence for abscess involving the left groin.    Right IJ central line placed today.  Plan to remove right femoral central line    Allergies:   Allergies   Allergen Reactions   • Coconut Unknown - High Severity   • Nuts Unknown - High Severity   • Penicillins    • [place] Other (See Comments)     Causes migraines per pt reports       Current Scheduled Medications:   ertapenem, 1 g, Intravenous, Q24H  famotidine, 20 mg, Intravenous, Q12H  FLUoxetine, 20 mg, Nasogastric, Nightly  fluticasone, 2 spray, Each Nare,  "BID  folic acid, 1 mg, Nasogastric, Daily  levETIRAcetam, 500 mg, Intravenous, Q12H  neomycin-polymyxin-hydrocortisone, 4 drop, Both Ears, Q6H  sodium bicarbonate, 650 mg, Nasogastric, BID  sodium chloride, 10 mL, Intravenous, Q12H      Current PRN Medications:  hold  •  hydrALAZINE  •  labetalol  •  Morphine  •  ondansetron **OR** ondansetron  •  potassium chloride **OR** potassium chloride **OR** potassium chloride  •  potassium phosphate infusion greater than 15 mMoles **OR** potassium phosphate infusion greater than 15 mMoles **OR** potassium phosphate **OR** sodium phosphate IVPB **OR** sodium phosphate IVPB **OR** sodium phosphate IVPB  •  sodium chloride  •  [COMPLETED] Insert peripheral IV **AND** sodium chloride  •  [COMPLETED] Insert peripheral IV **AND** sodium chloride  •  sodium chloride    Review of Systems   Unable to perform ROS: Intubated       Objective     Vital Signs:  Temp (24hrs), Av °F (36.1 °C), Min:96.5 °F (35.8 °C), Max:97.5 °F (36.4 °C)      /78   Pulse 91   Temp 96.7 °F (35.9 °C) (Axillary)   Resp 16   Ht 170.2 cm (67\")   Wt 72.2 kg (159 lb 2.8 oz)   SpO2 96%   BMI 24.93 kg/m²         Physical Exam   General: Patient is acutely ill-appearing lying in bed in the CCU.    HEENT: ET tube is in place.  OG tube is in place.  Respiratory: Effort even and unlabored.  FiO2 30% PEEP of 5 O2 sats 99- 100%  Neuro: Sedated on propofol  Psych: Sedated on propofol      Line/IV site: CC femoral right, condition patent and no redness.  Central line placed right IJ      Results Review:    I reviewed the patient's new clinical results.    Lab Results:  CBC:   Lab Results   Lab 04/15/22  1150 22  0200 22  1511 22  0732 22  1417 22  0231 22  0329 22  0417   WBC 15.69* 15.70*  --  12.14* 12.54* 11.08* 10.38 8.27   HEMOGLOBIN 7.2* 4.8* 9.0* 6.6* 8.8* 7.9* 8.8* 7.4*   HEMATOCRIT 21.4* 15.8* 26.3* 19.4* 25.1* 23.5* 25.9* 22.4*   PLATELETS 347 301  --  161 " 162 185 214 218   LYMPHOCYTE %  --   --   --   --   --  4.0*  --   --    MONOCYTES %  --   --   --   --   --  5.1  --   --            CMP:     Lab Results   Lab 04/18/22  0231 04/19/22  0329 04/19/22  1650 04/20/22  0417   SODIUM 140 142  --  142   POTASSIUM 3.4* 3.6 4.5 3.7   CHLORIDE 106 108*  --  111*   CO2 25.0 24.0  --  24.0   BUN 7 6  --  5*   CREATININE 0.92 0.91  --  0.71   CALCIUM 7.9* 8.7  --  8.3*   BILIRUBIN 0.5 0.6  --  0.5   ALK PHOS 74 88  --  78   ALT (SGPT) 17 21  --  20   AST (SGOT) 45* 43*  --  46*   GLUCOSE 77 91  --  79       Estimated Creatinine Clearance: 115.2 mL/min (by C-G formula based on SCr of 0.71 mg/dL).    Culture Results:    Microbiology Results (last 10 days)     Procedure Component Value - Date/Time    Culture, CSF - Cerebrospinal Fluid, Lumbar Puncture [[id number]] Collected: 04/17/22 1239    Lab Status: Final result Specimen: Cerebrospinal Fluid from Lumbar Puncture Updated: 04/20/22 0535     CSF Culture No growth at 3 days     Gram Stain No WBCs or organisms seen    Anaerobic Culture - Cerebrospinal Fluid, Spine, Lumbar [[id number]]  (Normal) Collected: 04/17/22 1239    Lab Status: Preliminary result Specimen: Cerebrospinal Fluid from Spine, Lumbar Updated: 04/20/22 0609     Anaerobic Culture No anaerobes isolated at 3 days    AFB Culture - Cerebrospinal Fluid, Lumbar Puncture [[id number]] Collected: 04/17/22 1239    Lab Status: Preliminary result Specimen: Cerebrospinal Fluid from Lumbar Puncture Updated: 04/18/22 1110     AFB Stain No acid fast bacilli seen on direct smear    Cryptococcal AG, CSF - Cerebrospinal Fluid, Lumbar Puncture [[id number]]  (Normal) Collected: 04/17/22 1239    Lab Status: Final result Specimen: Cerebrospinal Fluid from Lumbar Puncture Updated: 04/17/22 1403     Cryptococcal Antigen, CSF Negative    Meningitis / Encephalitis Panel, PCR - Cerebrospinal Fluid, Lumbar Puncture [[id number]]  (Normal) Collected: 04/17/22 1239    Lab Status: Final result Specimen:  Cerebrospinal Fluid from Lumbar Puncture Updated: 04/18/22 1105     ESCHERICHIA COLI K1, PCR Not Detected     HAEMOPHILUS INFLUENZAE, PCR Not Detected     LISTERIA MONOCYTOGENES, PCR Not Detected     NEISSERIA MENINGITIDIS, PCR Not Detected     STREPTOCOCCUS AGALACTIAE, PCR Not Detected     STREPTOCOCCUS PNEUMONIAE, PCR Not Detected     CYTOMEGALOVIRUS (CMV), PCR Not Detected     ENTEROVIRUS, PCR Not Detected     HERPES SIMPLEX VIRUS 1 (HSV-1), PCR Not Detected     HERPES SIMPLEX VIRUS 2 (HSV-2), PCR Not Detected     HUMAN PARECHOVIRUS, PCR Not Detected     VARICELLA ZOSTER VIRUS (VZV), PCR Not Detected     CRYPTOCOCCUS NEOFORMANS / GATTII, PCR Not Detected     HUMAN HERPES VIRUS 6 PCR Not Detected    Narrative:      This test is performed by utilizing real time polymerace chain recation (PCR).   The [employer] ME Panel does not distinguish between latent and active CMV and HHV-6 infections. Detection of these viruses may indicate primary infection, secondary reactivation, or the presence of latent virus. Results should always be interpreted in conjunction with other clinical, laboratory, and epidemiological information.    Blood Culture - Blood, Arm, Left [[id number]]  (Normal) Collected: 04/16/22 0346    Lab Status: Preliminary result Specimen: Blood from Arm, Left Updated: 04/20/22 0418     Blood Culture No growth at 4 days    Blood Culture - Blood, Arm, Left [[id number]]  (Normal) Collected: 04/16/22 0112    Lab Status: Preliminary result Specimen: Blood from Arm, Left Updated: 04/20/22 0147     Blood Culture No growth at 4 days    Blood Culture With AASHISH - Blood, Arm, Right [[id number]]  (Normal) Collected: 04/14/22 1938    Lab Status: Final result Specimen: Blood from Arm, Right Updated: 04/19/22 2017     Blood Culture No growth at 5 days    Urine Culture - Urine, Urine, Catheter In/Out [[id number]]  (Abnormal)  (Susceptibility) Collected: 04/14/22 1025    Lab Status: Final result Specimen: Urine, Catheter In/Out  Updated: 04/17/22 1129     Urine Culture >100,000 CFU/mL Klebsiella oxytoca ESBL     Comment: Consider infectious disease consult.  Susceptibility results may not correlate to clinical outcomes.       Susceptibility      Klebsiella oxytoca ESBL      SANDEEP      Amikacin Susceptible      Ertapenem Susceptible      Gentamicin Resistant     Levofloxacin Intermediate      Meropenem Susceptible      Nitrofurantoin Susceptible      Piperacillin + Tazobactam Susceptible      Tobramycin Intermediate      Trimethoprim + Sulfamethoxazole Susceptible                                      Radiology:   Imaging Results (Last 72 Hours)     Procedure Component Value Units Date/Time    XR Chest 1 View [[id number]] Collected: 04/20/22 1150     Updated: 04/20/22 1155    Narrative:      EXAMINATION: XR CHEST 1 VW- 4/20/2022 11:50 AM CDT     HISTORY: IJ placement; E87.6-Hypokalemia; N39.0-Urinary tract infection,  site not specified; R31.9-Hematuria, unspecified; R11.2-Nausea with  vomiting, unspecified; E87.2-Acidosis; R13.10-Dysphagia, unspecified;  A41.9-Sepsis, unspecified organism; N39.0-Urinary tract infection, site  not specified; Z78.9-Other specified health status.     REPORT: A frontal view of the chest was obtained.     COMPARISON: Chest x-ray 4/20/2022 0337 hours.     The endotracheal tube, nasogastric tube and partially visualized  external left nephrostomy tube appear in satisfactory position as  before. There is a new right internal jugular central line, tip projects  over the cavoatrial junction in good position. No pneumothorax is  identified. There is mild volume loss in the lung bases and mild central  vascular crowding. Heart size is normal. No pleural effusion is  identified.       Impression:      Interval insertion of a right internal jugular central line,  in good position without pneumothorax. No other significant change is  identified.  This report was finalized on 04/20/2022 11:52 by Dr. Florian Laurent MD.    XR  Chest 1 View [[id number]] Collected: 04/20/22 0723     Updated: 04/20/22 0727    Narrative:      HISTORY: Intubated     CXR: Frontal view the chest obtained     COMPARISON: 4/19/2022     FINDINGS: The endotracheal tube is slightly low-lying with the tip  position 1.5 cm above the jaime. Enteric tube tip in the fundus of the  stomach.     There are bibasilar densities, patchy atelectasis or possibly pneumonia.  No radiographic evidence of edema. No pleural effusion or pneumothorax.  Stable mediastinal contours.       Impression:      1. Slightly low-lying endotracheal tube. Enteric tube tip in the fundus  of stomach.  2. Bibasilar densities, slightly increased from yesterday's exam  represent patchy atelectasis, however early pneumonia considered.  This report was finalized on 04/20/2022 07:24 by Dr. Rina Ricardo MD.    XR Abdomen KUB [[id number]] Collected: 04/20/22 0714     Updated: 04/20/22 0718    Narrative:      EXAMINATION: KV radiograph 4/20/2022     HISTORY: Ileus.     FINDINGS: Today's exam is compared to previous study of 4 days earlier.  There is improving colonic and small bowel distention consistent with a  resolving ileus. A left-sided nephrostomy tube and ureteral stent are  stable in position from the previous exam. A NG tube remains in place.  There is no evidence of free air.       Impression:      1.. Improving ileus with diminishing small bowel and colonic distention.  This report was finalized on 04/20/2022 07:15 by Dr. Yuniel De Jesus MD.    XR Chest 1 View [[id number]] Collected: 04/20/22 0711     Updated: 04/20/22 0716    Narrative:      EXAMINATION: Chest 1 view 4/19/2022     HISTORY: Tube exchange     FINDINGS: Upright frontal projection of the chest is compared to  previous exam of one day earlier. An endotracheal tube and NG tube  remain in place. There is elevation of the right diaphragm with mild  right basilar atelectasis. Lungs are otherwise clear. There is no  effusion or free air  present.       Impression:      1.. No interval line changes.  2. Elevated right diaphragm with right basilar atelectasis.  This report was finalized on 04/20/2022 07:12 by Dr. Yuniel De Jesus MD.    US Venous Doppler Lower Extremity Bilateral (duplex) [[id number]] Collected: 04/19/22 1527     Updated: 04/19/22 1531    Narrative:      History: Swelling       Impression:      Impression: There is no evidence of deep venous thrombosis or  superficial thrombophlebitis of right or left lower extremities.     Comments: Bilateral lower extremity venous duplex exam was performed  using color Doppler flow, Doppler waveform analysis, and grayscale  imaging, with and without compression. There is no evidence of deep  venous thrombosis in the common femoral, superficial femoral, popliteal,  peroneal, anterior tibial, and posterior tibial veins bilaterally. No  thrombus is identified in the saphenofemoral junctions and greater  saphenous veins bilaterally.         This report was finalized on 04/19/2022 15:27 by Dr. Robin Kam MD.    XR Chest 1 View [[id number]] Collected: 04/19/22 0839     Updated: 04/19/22 0845    Narrative:      EXAMINATION: XR CHEST 1 VW- 4/19/2022 8:39 AM CDT     HISTORY: vent check; E87.6-Hypokalemia; N39.0-Urinary tract infection,  site not specified; R31.9-Hematuria, unspecified; R11.2-Nausea with  vomiting, unspecified; E87.2-Acidosis; R13.10-Dysphagia, unspecified;  A41.9-Sepsis, unspecified organism; N39.0-Urinary tract infection, site  not specified.     REPORT: A frontal view of the chest was obtained.     COMPARISON: Chest x-ray 4/18/2022 1300 hours.     Endotracheal tube projects over the midline trachea with tip  approximately 15 mm superior to the jaime, satisfactory and stable. The  tip of the NG tube projects over the gastric fundus as before. There is  partial visualization of the internal and external left nephrostomy  tube, oriented vertically over the left lower chest and upper  abdomen.  There is mild atelectasis in the lung bases greater on the left. No lung  consolidation is identified. There is no pneumothorax or pleural  effusion. No acute osseous findings. Mild levoscoliosis of the thoracic  spine.       Impression:      Stable 1 day appearance of the chest.  This report was finalized on 04/19/2022 08:42 by Dr. Florian Laurent MD.    MRI Brain With & Without Contrast [[id number]] Collected: 04/18/22 1738     Updated: 04/18/22 1752    Narrative:      HISTORY: Unresponsive, improving kidney function     MRI BRAIN: Multiplanar imaging the brain performed pre- and post-IV  contrast.     COMPARISON: Nonenhanced MRI brain 4/16/2022     FINDINGS: There is hyperintense T2 FLAIR signal symmetrically along the  bilateral frontal parietal and occipital lobes. Increased signal on  diffusion-weighted images most likely T2 shine through artifact there is  no abnormal enhancement identified. Minimal hyperintense FLAIR signal  within the periventricular regions with few hyperintense punctate FLAIR  signal within the subcortical frontal lobes. No abnormal extra-axial  fluid collection.     Limited assessment of the orbits and base of skull unremarkable.       Impression:      1. No abnormal enhancement identified within the region of hyperintense  T2/FLAIR signal involving the bilateral frontal,parietal and occipital  cortical regions. 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 No abnormal enhancement identified.  This report was finalized on 04/18/2022 17:49 by Dr. Rina Ricardo MD.    XR Chest 1 View [[id number]] Collected: 04/18/22 1403     Updated: 04/18/22 1410    Narrative:      EXAMINATION: XR CHEST 1 VW- 4/18/2022 2:03 PM CDT     HISTORY: Intubation; E87.6-Hypokalemia; N39.0-Urinary tract infection,  site not specified; R31.9-Hematuria, unspecified; R11.2-Nausea with  vomiting, unspecified; E87.2-Acidosis; R13.10-Dysphagia,  unspecified;  A41.9-Sepsis, unspecified organism; N39.0-Urinary tract infection, site  not specified.     REPORT: A frontal view the chest was obtained.     COMPARISON: Chest x-ray 2/20/2022.     The patient is now intubated, with endotracheal tube in satisfactory  position with tip approximately 1.4 cm superior to the jaime. An NG  tube has been inserted, tip is at the gastric fundus level. There is  partial visualization of the left-sided external/internal percutaneous  nephrostomy nephrostomy catheter, the tip is shown to be over the  midline exterior to the patient on previous CT of the abdomen on  4/15/2022. There is mild atelectasis in the left lung base. No lung  consolidation is identified. Heart size is normal. No pneumothorax or  pleural effusion is identified.       Impression:      Satisfactory position of the endotracheal tube and NG tube.  Mild volume loss in the lung bases, greater on the left. No lung  consolidation is identified. There is no pneumothorax or pleural  effusion.        This report was finalized on 04/18/2022 14:07 by Dr. Florian Laurent MD.    IR LUMBAR PUNCTURE DIAGNOSTIC [[id number]] Collected: 04/17/22 1300     Updated: 04/17/22 1304    Narrative:      EXAM: IR LUMBAR PUNCTURE DIAGNOSTIC- - 4/17/2022 12:06 PM CDT     HISTORY: unresponsive and abnormal mri; E87.6-Hypokalemia; N39.0-Urinary  tract infection, site not specified; R31.9-Hematuria, unspecified;  R11.2-Nausea with vomiting, unspecified; E87.2-Acidosis;  R13.10-Dysphagia, unspecified; A41.9-Sepsis, unspecified organism;  N39.0-Urinary tract infection, site not specified       COMPARISON: None.      TECHNIQUE: Fluoroscopy-guided lumbar puncture for CSF.  Number of images: 2  Fluoroscopy time: 20 seconds  Dose: 13.8 mGy     PROCEDURE:   After discussing risks, benefits and alternatives of the procedure with  the patient's family, written consent was obtained.      A timeout was performed including the patient's name, date of  "birth and  procedure to be performed.     The patient was prepped and draped in sterile fashion. 1 percent  lidocaine was administered for local anesthesia.     Under fluoroscopic guidance, a 20-gauge needle was advanced into the  thecal sac at the L3-L4 level. 10 mL of clear fluid was removed. The  patient tolerated the procedure well, and there were no immediate  complications.      The CSF was sent for analysis per referring clinician's orders.     Opening pressure: 13.8 cm of water       Impression:      Successful fluoroscopic-guided lumbar puncture as described. 10 mL clear  CSF sent to the laboratory..  This report was finalized on 04/17/2022 13:00 by Dr. Gomez Cárdenas MD.          Assessment/Plan     Active Hospital Problems    Diagnosis    • **Intractable nausea and vomiting    • Severe malnutrition (CMS/HCC)    • Hypophosphatemia    • Hypokalemia    • UTI (urinary tract infection), bacterial    • Malfunction of nephrostomy tube (HCC)    • Lactic acidosis    • Sepsis secondary to UTI (HCC)    • Essential (primary) hypertension        IMPRESSION:    1. Acute respiratory on the vent-understanding and she was placed in the vent for airway protection in order to get an MRI.  She is on minimal ventilator settings  2. History of necrotizing urinary tract infection previous admission now with urinary tract infection with ESBL Klebsiella -in and out cath collection per computer.  On ertapenem  3. Leukocytosis- resolved  4. Acute blood loss anemia thought secondary to left thigh hematoma-Per report of CT \"suspicious for abscess in the left abductor katie muscle\"- Dr. Scott following.  No concern for abscess clinically at this time.  Hemoglobin trending down again.  Reviewed with CARLEY Gonzalez.  No change in size of left lower extremity.  5. Abnormal MRI of brain.  6. Blood culture with coagulase-negative staph-contaminant    RECOMMENDATION:   · Plan on 7-day course of IV antibiotics for ESBL E. coli urinary tract " infection and discontinue-patient was previously treated for ESBL Klebsiella and Proteus in urine beginning of hospitalization.  We will then plan to monitor patient and reculture if indicated  · Ventilator management per hospitalist  · Hemodynamic management per hospitalist  · Monitor hemoglobin closely especially given recent blood loss.      Discussed with CARLEY Gonzalez MD  04/20/22  12:46 CDT        Electronically signed by Rachael Foy MD at 04/20/22 1419     Brody Potter MD at 04/20/22 1034              [facility] Medicine Services  INPATIENT PROGRESS NOTE    Patient Name: Namita Zabala  Date of Admission: 4/2/2022  Today's Date: 04/20/22  Length of Stay: 16  Primary Care Physician: David Lara MD    Subjective   Chief Complaint: AMS  Weakness - Generalized    Dizziness       Intubated/Sedated  Afebrile        Review of Systems   Unable to perform ROS: Mental status change        All pertinent negatives and positives are as above. All other systems have been reviewed and are negative unless otherwise stated.     Objective    Temp:  [96.5 °F (35.8 °C)-97.5 °F (36.4 °C)] 96.5 °F (35.8 °C)  Heart Rate:  [] 90  Resp:  [15-19] 16  BP: ()/(53-93) 105/74  FiO2 (%):  [30 %] 30 %  Physical Exam  Constitutional:       Appearance: Normal appearance. She is well-developed.      Interventions: She is sedated and intubated.   HENT:      Head: Normocephalic and atraumatic.      Right Ear: Tympanic membrane, ear canal and external ear normal.      Left Ear: Tympanic membrane, ear canal and external ear normal.      Nose: Nose normal.      Mouth/Throat:      Mouth: Mucous membranes are moist.      Pharynx: Oropharynx is clear.   Eyes:      Extraocular Movements: Extraocular movements intact.      Conjunctiva/sclera: Conjunctivae normal.      Pupils: Pupils are equal, round, and reactive to light.   Cardiovascular:       Rate and Rhythm: Normal rate and regular rhythm.      Heart sounds: Normal heart sounds.   Pulmonary:      Effort: Pulmonary effort is normal. She is intubated.      Breath sounds: Normal breath sounds.   Abdominal:      General: Bowel sounds are normal. There is no distension.      Palpations: Abdomen is soft.      Tenderness: There is no abdominal tenderness.   Musculoskeletal:         General: Normal range of motion.      Cervical back: Normal range of motion and neck supple.   Skin:     General: Skin is warm and dry.   Psychiatric:         Mood and Affect: Mood normal.         Speech: Speech normal.         Behavior: Behavior normal.         Thought Content: Thought content normal.         Judgment: Judgment normal.             Results Review:  I have reviewed the labs, radiology results, and diagnostic studies.    Laboratory Data:   Results from last 7 days   Lab Units 04/20/22  0417 04/19/22  0329 04/18/22  0231   WBC 10*3/mm3 8.27 10.38 11.08*   HEMOGLOBIN g/dL 7.4* 8.8* 7.9*   HEMATOCRIT % 22.4* 25.9* 23.5*   PLATELETS 10*3/mm3 218 214 185        Results from last 7 days   Lab Units 04/20/22  0417 04/19/22  1650 04/19/22  0329 04/18/22  0231   SODIUM mmol/L 142  --  142 140   POTASSIUM mmol/L 3.7 4.5 3.6 3.4*   CHLORIDE mmol/L 111*  --  108* 106   CO2 mmol/L 24.0  --  24.0 25.0   BUN mg/dL 5*  --  6 7   CREATININE mg/dL 0.71  --  0.91 0.92   CALCIUM mg/dL 8.3*  --  8.7 7.9*   BILIRUBIN mg/dL 0.5  --  0.6 0.5   ALK PHOS U/L 78  --  88 74   ALT (SGPT) U/L 20  --  21 17   AST (SGOT) U/L 46*  --  43* 45*   GLUCOSE mg/dL 79  --  91 77       Culture Data:   Blood Culture   Date Value Ref Range Status   04/16/2022 No growth at 2 days  Preliminary   04/16/2022 No growth at 2 days  Preliminary   04/14/2022 No growth at 3 days  Preliminary     Urine Culture   Date Value Ref Range Status   04/14/2022 >100,000 CFU/mL Klebsiella oxytoca ESBL (A)  Final     Comment:     Consider infectious disease consult.  Susceptibility  results may not correlate to clinical outcomes.       Radiology Data:   Imaging Results (Last 24 Hours)     Procedure Component Value Units Date/Time    XR Chest 1 View [[id number]] Collected: 04/20/22 0723     Updated: 04/20/22 0727    Narrative:      HISTORY: Intubated     CXR: Frontal view the chest obtained     COMPARISON: 4/19/2022     FINDINGS: The endotracheal tube is slightly low-lying with the tip  position 1.5 cm above the jaime. Enteric tube tip in the fundus of the  stomach.     There are bibasilar densities, patchy atelectasis or possibly pneumonia.  No radiographic evidence of edema. No pleural effusion or pneumothorax.  Stable mediastinal contours.       Impression:      1. Slightly low-lying endotracheal tube. Enteric tube tip in the fundus  of stomach.  2. Bibasilar densities, slightly increased from yesterday's exam  represent patchy atelectasis, however early pneumonia considered.  This report was finalized on 04/20/2022 07:24 by Dr. Rina Ricardo MD.    XR Abdomen KUB [[id number]] Collected: 04/20/22 0714     Updated: 04/20/22 0718    Narrative:      EXAMINATION: KV radiograph 4/20/2022     HISTORY: Ileus.     FINDINGS: Today's exam is compared to previous study of 4 days earlier.  There is improving colonic and small bowel distention consistent with a  resolving ileus. A left-sided nephrostomy tube and ureteral stent are  stable in position from the previous exam. A NG tube remains in place.  There is no evidence of free air.       Impression:      1.. Improving ileus with diminishing small bowel and colonic distention.  This report was finalized on 04/20/2022 07:15 by Dr. Yuniel De Jesus MD.    XR Chest 1 View [[id number]] Collected: 04/20/22 0711     Updated: 04/20/22 0716    Narrative:      EXAMINATION: Chest 1 view 4/19/2022     HISTORY: Tube exchange     FINDINGS: Upright frontal projection of the chest is compared to  previous exam of one day earlier. An endotracheal tube and NG  tube  remain in place. There is elevation of the right diaphragm with mild  right basilar atelectasis. Lungs are otherwise clear. There is no  effusion or free air present.       Impression:      1.. No interval line changes.  2. Elevated right diaphragm with right basilar atelectasis.  This report was finalized on 04/20/2022 07:12 by Dr. Yuniel De Jesus MD.    US Venous Doppler Lower Extremity Bilateral (duplex) [[id number]] Collected: 04/19/22 1527     Updated: 04/19/22 1531    Narrative:      History: Swelling       Impression:      Impression: There is no evidence of deep venous thrombosis or  superficial thrombophlebitis of right or left lower extremities.     Comments: Bilateral lower extremity venous duplex exam was performed  using color Doppler flow, Doppler waveform analysis, and grayscale  imaging, with and without compression. There is no evidence of deep  venous thrombosis in the common femoral, superficial femoral, popliteal,  peroneal, anterior tibial, and posterior tibial veins bilaterally. No  thrombus is identified in the saphenofemoral junctions and greater  saphenous veins bilaterally.         This report was finalized on 04/19/2022 15:27 by Dr. Robin Kam MD.          I have reviewed the patient's current medications.     Assessment/Plan     Active Hospital Problems    Diagnosis    • **Intractable nausea and vomiting    • Severe malnutrition (CMS/HCC)    • Hypophosphatemia    • Hypokalemia    • UTI (urinary tract infection), bacterial    • Malfunction of nephrostomy tube (HCC)    • Lactic acidosis    • Sepsis secondary to UTI (HCC)    • Essential (primary) hypertension      1.  AMS:  Metabolic Encephalopathy vs Encephalitis  -Encephalitis panel pending  -Paraneoplastic panel pending  -Neuro following  -on Empiric Keppra    2.  Thigh hematoma  -AC held  -Surgery    3.  HTN  -monitor    4.  ESBL UTI  -IV Abx  -ID following    4.  History of bladder rupture  -continue stratton    5.  Presence  of Nephrostomy tubes  -monitor  -will need removal at some point, can be done as outpatient    6.  ?Small PE  -CTA on 4/7 was negative  -CT Abdomen and Pelvis on 4/15 shows small filling defect in the right lung.    -Duplex scan is negative for DVT  -not currently able to tolerate AC due to bleeding into thigh  -question if filling defect is related to contrast phase    DVT PPX:  SCD's      Discharge Planning: Transfer arrangements pending.    Electronically signed by Brody Potter MD, 04/20/22, 10:34 CDT.      Electronically signed by Brody Potter MD at 04/20/22 8216

## 2022-04-21 NOTE — NURSING NOTE
Report given to Iman HOWE at [facility] Pt prepared for air transport, VS stable, bedside report given to transport team. Pt transported off unit without incident with copy of chart and imaging discs and the following personal belongings: personal blanket and pillow. Family and accepting facility notified of departure,  [facility], notified of remaining pt belongings to be picked up on CCU tonight or with security on or after 4/22. Belongings to be picked up include: duffle bag, plant and stuffed animal.

## 2022-04-21 NOTE — PROGRESS NOTES
"Palliative Care Daily Progress Note   Chief complaint: goals of care/advanced care planning  Code Status and Medical Interventions:   Ordered at: 22 0412     Level Of Support Discussed With:    Patient     Code Status (Patient has no pulse and is not breathing):    CPR (Attempt to Resuscitate)     Medical Interventions (Patient has pulse or is breathing):    Full Support     Subjective   Medical record reviewed. Events noted. She remains anemic, sodium was slightly elevated this morning at 148, calcium remains low as well as albumin although AST improving. Remains intubated and sedated with propofol. Does not appear in any distress. No visitors at bedside.     Advance Care Planning   Advance Care Planning Discussion: Spoke to patient's spouse, Grant, to determine if he had any questions/concerns. He shared he was able to discuss MRI findings this morning with neurologist and states, \"It's not the best news.\" Reports he remains hopeful for transfer to [facility] for further follow up and \"praying to find someone that can do something for her.\"    Advance Directive Status: Patient does not have advance directive   Goals of care: Ongoing.    The patient receives support from her spouse and parent.  POA/Healthcare Surrogate - Next of kin is her spouse, Grant.    Review of Systems   Unable to perform ROS: intubated     Pain Assessment  Nonverbal Indicators of Pain: grimace  CPOT and PAINAD Scales: PAINAD (Pain Assessment in Advance Dementia Scale)  CPOT Facial Expression: 0-->relaxed, neutral  CPOT Body Movements: 0-->absence of movements  CPOT Muscle Tension: 0-->relaxed  Ventilator Compliance/Vocalization: 0-->tolerating ventilator or movement  CPOT Score: 0  PAINAD Breathin-->normal  PAINAD Negative Vocalization: 0-->none  PAINAD Facial Expression: 0-->smiling or inexpressive  PAINAD Body Language: 0-->relaxed  PAINAD Consolability: 0-->no need to console  PAINAD Score: 0  Pain Location: " abdomen    Objective   Diagnostics: Reviewed      Intake/Output Summary (Last 24 hours) at 4/21/2022 1122  Last data filed at 4/21/2022 0934  Gross per 24 hour   Intake 2662.48 ml   Output 1275 ml   Net 1387.48 ml     Current Facility-Administered Medications   Medication Dose Route Frequency Provider Last Rate Last Admin   • famotidine (PEPCID) injection 20 mg  20 mg Intravenous Q12H Amado Villarreal MD   20 mg at 04/21/22 0934   • FLUoxetine (PROzac) capsule 20 mg  20 mg Nasogastric Nightly Brody Potter MD   20 mg at 04/20/22 2009   • fluticasone (FLONASE) 50 MCG/ACT nasal spray 2 spray  2 spray Each Nare BID Frank Ricks MD   2 spray at 04/21/22 0935   • folic acid (FOLVITE) tablet 1 mg  1 mg Nasogastric Daily Brody Potter MD   1 mg at 04/20/22 2009   • Hold medication  1 each Does not apply Continuous PRN Abiola Yan MD       • hydrALAZINE (APRESOLINE) injection 10 mg  10 mg Intravenous Q4H PRN Amado Villarreal MD       • labetalol (NORMODYNE,TRANDATE) injection 10 mg  10 mg Intravenous Q4H PRN Amado Villarreal MD   10 mg at 04/10/22 1518   • lactated ringers infusion  75 mL/hr Intravenous Continuous Amado Villarreal MD 75 mL/hr at 04/21/22 0800 75 mL/hr at 04/21/22 0800   • levETIRAcetam in NaCl 0.82% (KEPPRA) IVPB 500 mg  500 mg Intravenous Q12H Abiola Yan MD   500 mg at 04/21/22 0934   • Morphine sulfate (PF) injection 2 mg  2 mg Intravenous Q4H PRN Amado Villarreal MD       • norepinephrine (LEVOPHED) 8 mg in 250 mL NS infusion (premix)  0.02-0.3 mcg/kg/min Intravenous Titrated Brody Potter MD   Stopped at 04/18/22 1715   • ondansetron (ZOFRAN) tablet 4 mg  4 mg Oral Q6H PRN Frank Ricks MD        Or   • ondansetron (ZOFRAN) injection 4 mg  4 mg Intravenous Q6H PRN Frank Ricks MD   4 mg at 04/16/22 1000   • potassium chloride (MICRO-K) CR capsule 40 mEq  40 mEq Oral PRN Amado Villarreal MD        Or   • potassium chloride (KLOR-CON) packet  40 mEq  40 mEq Oral PRN Amado Villarreal MD        Or   • potassium chloride 10 mEq in 100 mL IVPB  10 mEq Intravenous Q1H PRN Amado Villarreal  mL/hr at 04/19/22 1046 10 mEq at 04/19/22 1046   • potassium phosphate 45 mmol in sodium chloride 0.9 % 500 mL infusion  45 mmol Intravenous PRN Amado Villarreal MD        Or   • potassium phosphate 30 mmol in sodium chloride 0.9 % 250 mL infusion  30 mmol Intravenous PRN Amado Villarreal MD 31.3 mL/hr at 04/16/22 1900 30 mmol at 04/16/22 1900    Or   • Potassium Phosphates 15 mmol in sodium chloride 0.9 % 100 mL infusion  15 mmol Intravenous PRN Amado Villarreal MD        Or   • sodium phosphates 45 mmol in sodium chloride 0.9 % 500 mL IVPB  45 mmol Intravenous PRN Amado Villarreal MD        Or   • sodium phosphates 30 mmol in sodium chloride 0.9 % 250 mL IVPB  30 mmol Intravenous PRN Amado Villarreal MD        Or   • sodium phosphates 15 mmol in sodium chloride 0.9 % 250 mL IVPB  15 mmol Intravenous PRN Amado Villarreal MD       • propofol (DIPRIVAN) infusion 10 mg/mL 100 mL  5-50 mcg/kg/min Intravenous Titrated Brody Potter MD 8.65 mL/hr at 04/21/22 0911 20 mcg/kg/min at 04/21/22 0911   • sodium bicarbonate tablet 650 mg  650 mg Nasogastric BID Brody Potter MD   650 mg at 04/21/22 0934   • sodium chloride 0.9 % flush 10 mL  10 mL Intravenous PRN Frank Ricks MD       • sodium chloride 0.9 % flush 10 mL  10 mL Intravenous PRN Frank Ricks MD       • sodium chloride 0.9 % flush 10 mL  10 mL Intravenous PRN Frank Ricks MD       • sodium chloride 0.9 % flush 10 mL  10 mL Intravenous Q12H Frank Ricks MD   10 mL at 04/21/22 0911   • sodium chloride 0.9 % flush 10 mL  10 mL Intravenous PRN Frank Ricks MD   10 mL at 04/10/22 1518     hold, 1 each  lactated ringers, 75 mL/hr, Last Rate: 75 mL/hr (04/21/22 0800)  norepinephrine, 0.02-0.3 mcg/kg/min, Last Rate: Stopped (04/18/22 1715)  propofol, 5-50 mcg/kg/min, Last Rate: 20  "mcg/kg/min (04/21/22 0911)      hold  •  hydrALAZINE  •  labetalol  •  Morphine  •  ondansetron **OR** ondansetron  •  potassium chloride **OR** potassium chloride **OR** potassium chloride  •  potassium phosphate infusion greater than 15 mMoles **OR** potassium phosphate infusion greater than 15 mMoles **OR** potassium phosphate **OR** sodium phosphate IVPB **OR** sodium phosphate IVPB **OR** sodium phosphate IVPB  •  sodium chloride  •  [COMPLETED] Insert peripheral IV **AND** sodium chloride  •  [COMPLETED] Insert peripheral IV **AND** sodium chloride  •  sodium chloride    Assessment:  Vital Signs: /75   Pulse 92   Temp 98.6 °F (37 °C) (Axillary)   Resp 16   Ht 170.2 cm (67\")   Wt 70 kg (154 lb 6.4 oz)   SpO2 99%   BMI 24.18 kg/m²     Physical Exam  Vitals and nursing note reviewed.   Constitutional:       General: She is not in acute distress.     Appearance: She is ill-appearing.      Interventions: She is sedated, intubated and restrained.   HENT:      Head: Normocephalic and atraumatic.      Nose:      Comments: NG right nostril     Mouth/Throat:      Comments: ETT  Eyes:      General: Lids are normal.   Neck:      Vascular: No JVD.   Cardiovascular:      Rate and Rhythm: Tachycardia present.      Heart sounds: Normal heart sounds.   Pulmonary:      Effort: She is intubated.      Breath sounds: Decreased breath sounds present.   Abdominal:      General: Bowel sounds are decreased. There is no distension.      Palpations: Abdomen is soft.      Comments: Fecal management tube   Genitourinary:     Comments: Mojica catheter and left sided nephrostomy tube.  Musculoskeletal:      Right hand: Swelling present.      Cervical back: Neck supple.      Right foot: Foot drop present.      Left foot: Foot drop present.   Skin:     General: Skin is warm and dry.      Coloration: Skin is pale.      Findings: Signs of injury (documented pressure injury) present.   Neurological:      Motor: Weakness and abnormal " muscle tone present.      Comments: CARLI, intubated and sedated.   Psychiatric:      Comments: CARLI, intubated and sedated.     Patient status: Disease state: Deteriorating despite treatments.  Functional status: Palliative Performance Scale Score: Performance 10% based on the following measures: Ambulation: Totally bed bound, Activity and Evidence of Disease: Unable to do any work, extensive evidence of disease, Self-Care: Total care required,  Intake: Mouth care only, LOC: Drowsy or comatose. Nutritional status: Albumin 2.20.Body mass index is 24.18 kg/m².     Impression/Problem List:  1.    Altered mental status / concerns for PRES per MRI of brain / neurology suspect anoxic injury   2.    Impaired mobility and ability to perform activities of daily living  3.    Severe malnutrition  4.    Urinary tract infection  5.    Sepsis secondary to UTI  6.    Hypotension   7.    Abnormal EEG  8.    Pulmonary embolism suspected  9.    Anemia  10.  Ileus, improving   11.  Hypocalcemia  12.  Hepatic steatosis  13.  Malfunctioning nephrostomy tube  14.  Folate deficiency  15.  Anxiety  16.  Lactic acidosis  17.  Dysphagia    18.  Intractable nausea and vomiting, improved  19.  History of COVID-19 (August 2021)  20.  Presence of NG  21.  Elevated AST  22.  Impaired functioning of gallbladder (sludge-filled and 20% EF per imaging)  23.  Hypernatremia     Plans/Recommendations     1. Goals of care include CODE STATUS CPR with full support interventions.    2. Palliative care encounter  - Prognosis is guarded secondary to altered mental status with abnormal MRI and EEG, impaired mobility and ability to perform activities of daily living, severe malnutrition, urinary tract infection, sepsis, hypotension, malfunctioning nephrostomy tube, anemia, ileus, dysphagia, hypokalemia, elevated AST, hepatic steatosis, suspicion for abscess of left abductor major and other comorbidities listed above.  - Spoke to patient's spouse, Grant. Shared  "he was able to discuss MRI findings this morning with neurologist and states, \"It's not the best news.\" Reports he remains hopeful for transfer to tertiary care center for further follow up and \"praying to find someone that can do something for her.\"  - Plans to transfer to [facility] today.       Thank you for allowing us to participate in patient's plan of care. Palliative Care Team will continue to follow patient.     Time spent: 30 minutes spent reviewing medical and medication records, assessing and examining patient, discussing with family, answering questions, providing some guidance about a plan and documentation of care, and coordinating care with other healthcare members, with > 50% time spent face to face.       Nneka Barton, APRN  4/21/2022    "

## 2022-04-22 LAB — BACTERIA SPEC ANAEROBE CULT: NORMAL

## 2022-04-22 NOTE — PAYOR COMM NOTE
"REF: [id number]    [facility]  [place]   [phone]  OR  FAX  [phone]      Namita Cooper MARCELO (46 y.o. Female)             Date of Birth   1975    Social Security Number       Address   [address] [place], [place] [address]    Home Phone   [phone]    MRN   [id number]       [facility]    Marital Status                               Admission Date   4/2/22    Admission Type   Emergency    Admitting Provider   Brody Potter MD    Attending Provider       Department, Room/Bed   [facility] CARDIAC CARE, C006/1       Discharge Date   4/21/2022    Discharge Disposition   [facility] (DC - External)    Discharge Destination                               Attending Provider: (none)   Allergies: Coconut, Nuts, Penicillins, Turkey    Isolation: None   Infection: COVID (History) (09/15/21), ESBL Klebsiella (04/07/22)   Code Status: Prior   Advance Care Planning Activity    Ht: 170.2 cm (67\")   Wt: 70 kg (154 lb 6.4 oz)    Admission Cmt: None   Principal Problem: Intractable nausea and vomiting [R11.2]                 Active Insurance as of 4/2/2022     Primary Coverage     Payor Plan Insurance Group Employer/Plan Group    ANTHEM BLUE CROSS ANTHEM BLUE CROSS BLUE SHIELD PPO [id number]     Payor Plan Address Payor Plan Phone Number Payor Plan Fax Number Effective Dates    [address] [phone]  1/1/2022 - None Entered    [facility] [id number]       Subscriber Name Subscriber Birth Date Member ID       KENNETHBRANDON RIBEIRO 1975 [id number]           Secondary Coverage     Payor Plan Insurance Group Employer/Plan Group    MEDICARE MEDICARE A & B      Payor Plan Address Payor Plan Phone Number Payor Plan Fax Number Effective Dates    [address] [phone]  7/1/2019 - None Entered    [facility] [address]       Subscriber Name Subscriber Birth Date Member ID       NAMITA COOPER MARCELO 1975 [id number]                 Emergency Contacts     "  (Rel.) Home Phone Work Phone Mobile Phone    Grant Zabala (Spouse) -- -- [phone]    Noel Johnson (Father) [phone] -- [phone]    Marquita Johnson (Mother) -- -- [phone]    Neida Zabala -- -- [phone]               Discharge Summary      Brody Potter MD at 04/21/22 1124              [facility] Medicine Services  DISCHARGE SUMMARY       Date of Admission: 4/2/2022  Date of Discharge:  4/21/2022  Primary Care Physician: David Lara MD    Presenting Problem/History of Present Illness:  Hypokalemia [E87.6]     Final Discharge Diagnoses:  Active Hospital Problems    Diagnosis    • **Intractable nausea and vomiting    • Severe malnutrition (CMS/HCC)    • Hypophosphatemia    • Hypokalemia    • UTI (urinary tract infection), bacterial    • Malfunction of nephrostomy tube (HCC)    • Lactic acidosis    • Sepsis secondary to UTI (HCC)    • Essential (primary) hypertension       1.  AMS:  Etiology unknown  -LP negative  -Encephalitis panel negative  -Paraneoplastic panel pending  -Neuro following  -on Empiric Keppra  -MRI concerning for ischemic insult but no significant hypoxic events     2.  Thigh hematoma  -AC held  -Surgery following     3.  HTN  -monitor     4.  ESBL UTI  -IV Abx  -ID following     4.  History of bladder rupture  -continue stratton     5.  Presence of Nephrostomy tubes  -monitor  -will need removal at some point, can be done as outpatient     6.  ?Small PE  -CTA on 4/7 was negative  -CT Abdomen and Pelvis on 4/15 shows small filling defect in the right lung.    -Duplex scan is negative for DVT  -not currently able to tolerate AC due to bleeding into thigh  -question if filling defect is related to contrast phase/timing    7.  Ileus  -Surgery following  -Distention improved on most recent KUB  -Surgery ok with restarting tube feeds slowly         DVT PPX:  SCD's  Consults:   1.  GI  2.  Urology  3.  ENT  4.   Neurology  5.  Infectious Disease  6.  Neurology  7.  General Surgery    Procedures Performed:   n/a    Pertinent Test Results:   MRI Brain:   4/18/22  IMPRESSION:  1. No abnormal enhancement identified within the region of hyperintense  T2/FLAIR signal involving the bilateral frontal,parietal and occipital  cortical regions. 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 No abnormal enhancement identified.    MRI Brain 4/16/22:  IMPRESSION:  Impression:   Abnormal signal with restricted diffusion in the bilateral occipital and  also subcortical bilateral parietal cortex. Most likely this is an  encephalitis.            Results for orders placed during the hospital encounter of 08/27/21    Adult Transthoracic Echo Complete W/ Cont if Necessary Per Protocol    Interpretation Summary  · Estimated left ventricular EF = 60% Left ventricular systolic function is normal.  · Left ventricular diastolic function is consistent with (grade I) impaired relaxation.  · The right ventricular cavity is mildly dilated. Systolic function is normal.  · The right atrial cavity is mildly dilated.  · There is mild aortic and tricuspid valve regurgitation present.  · Estimated right ventricular systolic pressure from tricuspid regurgitation is normal (<35 mmHg).      Chief Complaint on Day of Discharge:   Intubated    History of Present Illness on Day of Discharge:   Intubated/Sedated    Hospital Course:  The patient is a 46 y.o. female who presented to [facility] with Nausea and vomiting.  She had a UTI and hypokalemia.  She was treated with IV abx.  Her potassium was replaced.  She was treated witih IV fluids.  GI was consulted given the nausea and vomiting.  They helped with medical management of this.      She has a complex urological history including history of bladder rupture and placement of nephrostomy tubes.  The Nephrostomy tubes are malfunctioning.  Urology was consulted.   As her renal function is WNL, Urology recommended outpatient follow up with her physicians at [place].      She developed hearing loss and ear pain.  ENT was consulted.  They arranged for an audiogram, but the patient refused this.  This was felt to be non-urgent and the plan was to pursue this at a later point.    She developed decreased mentation.  Neurology was consulted.  They performed an EEG which showed generalized slowing.  They felt this was related to metabolic encephalopathy related to the UTI.  MRI of the brain was obtained that was concerning for encephalitis.  Neurology was concerned for the possibility of underlying seizures.  They started her on Empiric Keppra.  Repeat MRI was read as above by radiology.  Neurology felt the MRI was very abnormal.  They felt it looked consistent with an ischemic insult.  However the patient has not had any major hypoxic episodes.      Infectious Disease was consulted given Infectious Issues.  They managed her antibiotics.   LP was obtained and unremarkable.  It was felt she did not feel she had an encephalitis.  Urine cultures did grow out ESBL Klebsiella.  She has been treated with Invanz.  She completed a 7 day course of this.      She developed left thigh swelling.  CT was obtained and concerning for abscess.  General surgery was consulted and actually felt this was a hematoma.  Her hemoglobin had dropped, so this was consistent with that theory.  She was transfused 3 units of prbc's.  Her anticoagulation was held.  Her hgb has stabilized.      She had an NGT placed.  KUB showed colonic and small bowel distention concerning for ileus.  Tube feeds were held.  Repeat exam showed improvement in this.  Surgery was ok with restarting tube feeds at a slow rate.     She has had persistently decreased mental status.  When she went for repeat MRI, her oxygen saturations dropped into the low 80's.  She was intubated for airway protection.    Of note:  She underwent CTA  "chest on 4/7/22 that showed no PE.  CT abdomen and pelvis on 4/15/22 makes note of a small right lower lobe filling defect that raises the question of a PE.  She had been anticoagulated up to this point.  Duplex scans show no DVT.  I suspect this is more related contrast phasing/timing.  Given her thigh hematoma, anticoagulation has been held.  It may be worth repeating a CTA once Neurological issues are better understood/resolved.      Due to the overall complexity of her care and especially her Neurological issues, transfer has been arranged to the [facility].  The patient has been accepted to the service of Dr. Torrez.  Due to her history of care at [facility], we did make multiple attempts to transfer the patient there.  They have been at capacity and unable to offer a bed at this time.                   Condition on Discharge:  stable    Physical Exam on Discharge:  /75   Pulse 92   Temp 98.6 °F (37 °C) (Axillary)   Resp 16   Ht 170.2 cm (67\")   Wt 70 kg (154 lb 6.4 oz)   SpO2 99%   BMI 24.18 kg/m²   Physical Exam  Physical Exam  Constitutional:       Appearance: Normal appearance. She is well-developed.      Interventions: She is sedated and intubated.   HENT:      Head: Normocephalic and atraumatic.      Right Ear: Tympanic membrane, ear canal and external ear normal.      Left Ear: Tympanic membrane, ear canal and external ear normal.      Nose: Nose normal.      Mouth/Throat:      Mouth: Mucous membranes are moist.      Pharynx: Oropharynx is clear.   Eyes:      Extraocular Movements: Extraocular movements intact.      Conjunctiva/sclera: Conjunctivae normal.      Pupils: Pupils are equal, round, and reactive to light.   Cardiovascular:      Rate and Rhythm: Normal rate and regular rhythm.      Heart sounds: Normal heart sounds.   Pulmonary:      Effort: Pulmonary effort is normal. She is intubated.      Breath sounds: Normal breath sounds.   Abdominal:      General: " Bowel sounds are normal. There is no distension.      Palpations: Abdomen is soft.      Tenderness: There is no abdominal tenderness.   Musculoskeletal:         General: Normal range of motion.      Cervical back: Normal range of motion and neck supple.   Skin:     General: Skin is warm and dry.         Discharge Disposition:  Short Term Hospital (DC - External)    Discharge Medications:     Discharge Medications      New Medications      Instructions Start Date   lactated ringers infusion   75 mL/hr (75 mL/hr), Intravenous, Continuous      levETIRAcetam in NaCl 0.82% 500 MG/100ML solution IVPB  Commonly known as: KEPPRA   500 mg, Intravenous, Every 12 Hours Scheduled      Norepinephrine-Sodium Chloride 8-0.9 MG/250ML-% solution infusion  Commonly known as: LEVOPHED   0.02-0.3 mcg/kg/min (1.442-21.63 mcg/min), Intravenous, Titrated      propofol 10 mg/mL emulsion infusion  Commonly known as: DIPRIVAN   5-50 mcg/kg/min (360.5-3,605 mcg/min), Intravenous, Titrated      sodium bicarbonate 650 MG tablet   650 mg, Oral, 2 Times Daily         Continue These Medications      Instructions Start Date   butalbital-acetaminophen-caffeine -40 MG per tablet  Commonly known as: FIORICET, ESGIC   2 tablets, Oral, Every 4 Hours PRN      Calcium Carbonate-Simethicone 750-80 MG chewable tablet   1 tablet, Oral, Daily      eszopiclone 3 MG tablet  Commonly known as: LUNESTA   3 mg, Oral, Nightly, Take immediately before bedtime      FLUoxetine 40 MG capsule  Commonly known as: PROzac   40 mg, Oral, Daily      methocarbamol 500 MG tablet  Commonly known as: ROBAXIN   500 mg, Oral, 3 Times Daily PRN      metoclopramide 5 MG/5ML solution  Commonly known as: REGLAN   5 mg, Oral, 3 Times Daily Before Meals      ondansetron ODT 4 MG disintegrating tablet  Commonly known as: ZOFRAN-ODT   4 mg, Translingual, 4 Times Daily PRN      oxymetazoline 0.05 % nasal spray  Commonly known as: AFRIN   2 sprays, Nasal, 2 Times Daily       pantoprazole 20 MG EC tablet  Commonly known as: PROTONIX   20 mg, Oral, Daily      prochlorperazine 10 MG tablet  Commonly known as: COMPAZINE   10 mg, Oral, Every 8 Hours PRN      promethazine 25 MG tablet  Commonly known as: PHENERGAN   25-50 mg, Oral, Every 6 Hours PRN      rizatriptan 10 MG tablet  Commonly known as: MAXALT   10 mg, Oral, Once As Needed, May repeat in 2 hours if needed      traZODone 100 MG tablet  Commonly known as: DESYREL   100 mg, Oral, Nightly         Stop These Medications    rivaroxaban 20 MG tablet  Commonly known as: XARELTO            Discharge Diet:     Activity at Discharge: bed rest    Discharge Care Plan/Instructions:   Follow up at [facility] today.    Follow-up Appointments:   No future appointments.    Test Results Pending at Discharge:   Paraneoplastic panel    Electronically signed by Brody Potter MD, 04/21/22, 11:24 CDT.    Time: 60 minutes          Electronically signed by Brody Potter MD at 04/21/22 1205       Discharge Order (From admission, onward)     Start     Ordered    04/21/22 1055  Discharge patient  Once        Expected Discharge Date: 04/21/22    Discharge Disposition: Short Term Hospital (DC - External)    Physician of Record for Attribution - Please select from Treatment Team: CARYN PISANO [[id number]]    Review needed by CMO to determine Physician of Record: No       Question Answer Comment   Physician of Record for Attribution - Please select from Treatment Team CARYN PISANO    Review needed by CMO to determine Physician of Record No        04/21/22 8101

## 2022-04-23 NOTE — THERAPY DISCHARGE NOTE
Acute Care - Speech Language Pathology Discharge Summary  [facility]       Patient Name: Namita Zabala  : 1975  MRN: [id number]    Today's Date: 2022                   Admit Date: 2022      SLP Recommendation and Plan    Visit Dx:    ICD-10-CM ICD-9-CM   1. Hypokalemia  E87.6 276.8   2. Urinary tract infection with hematuria, site unspecified  N39.0 599.0    R31.9 599.70   3. Nausea and vomiting, unspecified vomiting type  R11.2 787.01   4. Lactic acidosis  E87.2 276.2   5. Dysphagia, unspecified type  R13.10 787.20   6. Sepsis secondary to UTI (HCC)  A41.9 038.9    N39.0 995.91     599.0   7. Decreased activities of daily living (ADL)  Z78.9 V49.89                SLP GOALS     Row Name 22 1400             Oral Nutrition/Hydration Goal 1 (SLP)    Oral Nutrition/Hydration Goal 1, SLP Pt will tolerate LRD with no s/s of aspiration  -MB      Time Frame (Oral Nutrition/Hydration Goal 1, SLP) by discharge  -MB      Barriers (Oral Nutrition/Hydration Goal 1, SLP) cognitive status/participation  -MB      Progress/Outcomes (Oral Nutrition/Hydration Goal 1, SLP) goal not met  -MB            User Key  (r) = Recorded By, (t) = Taken By, (c) = Cosigned By    Initials Name Provider Type    Rafal Riojas CCC-SLP Speech and Language Pathologist                        SLP Discharge Summary  Anticipated Discharge Disposition (SLP): skilled nursing facility  Reason for Discharge: discharge from this facility  Progress Toward Achieving Short/long Term Goals: goals not met within established timelines  Discharge Destination: other (see comments) (Short term hospital)      Rafal Calabrese CCC-SLP  2022

## 2022-04-26 ENCOUNTER — DOCUMENTATION (OUTPATIENT)
Dept: PALLIATIVE CARE | Facility: CLINIC | Age: 47
End: 2022-04-26

## 2022-05-05 ENCOUNTER — TELEPHONE (OUTPATIENT)
Dept: INTERNAL MEDICINE | Age: 47
End: 2022-05-05

## 2022-05-05 NOTE — TELEPHONE ENCOUNTER
Spoke with patients  Rayne to let him know we are still following her admission and to see if there are any needs. He states she is still admitted to [facility]. She recently received a trach and is receiving tube feedings. I let him know to reach out for any needs.

## 2022-05-11 ENCOUNTER — TELEPHONE (OUTPATIENT)
Dept: GASTROENTEROLOGY | Facility: CLINIC | Age: 47
End: 2022-05-11

## 2022-05-11 NOTE — TELEPHONE ENCOUNTER
CONTACTED THE PATIENT TO SCHEDULE A 3 MONTH HOSPITAL FOLLOW UP APPOINTMENT/THIS IS A NEW PATIENT & THIS IS PER . LEFT MESSAGE ON THE PATIENTS VOICE MAIL BOX TO CONTACT THE OFFICE.

## 2022-05-12 ENCOUNTER — TELEPHONE (OUTPATIENT)
Dept: INTERNAL MEDICINE | Age: 47
End: 2022-05-12

## 2022-05-12 NOTE — TELEPHONE ENCOUNTER
Called and LM haleigh Mclain to call us back to see if the pt is still in the hospital and if there  Is any changes.

## 2022-05-12 NOTE — TELEPHONE ENCOUNTER
----- Message from Maddy Rico sent at 5/3/2022  8:09 AM CDT -----  Regarding: FW: H[facility]P [facility] Admission    ----- Message -----  From: Janice Ling MA  Sent: 4/4/2022   9:32 AM CDT  To: Janice Ling MA  Subject: HOSP [place] Admission                                    Currently admitted to hospitals 4/4/22

## 2022-05-19 ENCOUNTER — TELEPHONE (OUTPATIENT)
Dept: INTERNAL MEDICINE | Age: 47
End: 2022-05-19

## 2022-05-19 NOTE — TELEPHONE ENCOUNTER
----- Message from Maddy Sommers sent at 5/3/2022  8:09 AM CDT -----  Regarding: FW: H[facility]P [facility] Admission    ----- Message -----  From: Ankit Fernando MA  Sent: 4/4/2022   9:32 AM CDT  To: Ankit Fernando MA  Subject: HOSP [facility] Admission                                    Currently admitted to [facility] 4/4/22

## 2022-05-29 LAB
FUNGUS WND CULT: NORMAL
MYCOBACTERIUM SPEC CULT: NORMAL
NIGHT BLUE STAIN TISS: NORMAL

## 2022-06-23 ENCOUNTER — TELEPHONE (OUTPATIENT)
Dept: INTERNAL MEDICINE | Age: 47
End: 2022-06-23

## 2022-06-23 NOTE — TELEPHONE ENCOUNTER
Called and spoke Rayne patients spouse he said that pt has been transferred to [facility] respiratory clinic and they are trying to wean her off of the vent. She is still on a very low setting on this. He states once she is off of this he was going to try to get her moved some where closer to here. He said that they maybe looking at a long term placement. I will check back with them for updates.

## 2022-07-07 ENCOUNTER — TELEPHONE (OUTPATIENT)
Dept: INTERNAL MEDICINE | Age: 47
End: 2022-07-07

## 2022-07-07 NOTE — TELEPHONE ENCOUNTER
Called Rayne to see how pt was and if she had been sent to a long term care facility yet. His phones VM is full will try back.

## 2022-07-14 ENCOUNTER — TELEPHONE (OUTPATIENT)
Dept: INTERNAL MEDICINE | Age: 47
End: 2022-07-14

## 2022-07-14 NOTE — TELEPHONE ENCOUNTER
Attempted to reach pts spouse to see if she had been put in a long term care. The last time I spoke to him he was working on getting her placed in a long term care. I tried to call him but it goes straight to a full VM. I am going to sign off on this pt we have been following her almost a year and the plan is to go to a long term facility.

## 2023-09-12 ENCOUNTER — LAB REQUISITION (OUTPATIENT)
Dept: LAB | Facility: HOSPITAL | Age: 48
End: 2023-09-12
Payer: MEDICARE

## 2023-09-12 LAB
ALBUMIN SERPL-MCNC: 4.3 G/DL (ref 3.5–5.2)
ALBUMIN/GLOB SERPL: 1.5 G/DL
ALP SERPL-CCNC: 89 U/L (ref 39–117)
ALT SERPL W P-5'-P-CCNC: 62 U/L (ref 1–33)
ANION GAP SERPL CALCULATED.3IONS-SCNC: 14 MMOL/L (ref 5–15)
AST SERPL-CCNC: 27 U/L (ref 1–32)
BILIRUB SERPL-MCNC: 0.8 MG/DL (ref 0–1.2)
BUN SERPL-MCNC: 66 MG/DL (ref 6–20)
BUN/CREAT SERPL: 63.5 (ref 7–25)
CALCIUM SPEC-SCNC: 9.9 MG/DL (ref 8.6–10.5)
CHLORIDE SERPL-SCNC: 120 MMOL/L (ref 98–107)
CO2 SERPL-SCNC: 25 MMOL/L (ref 22–29)
CREAT SERPL-MCNC: 1.04 MG/DL (ref 0.57–1)
EGFRCR SERPLBLD CKD-EPI 2021: 67.3 ML/MIN/1.73
GLOBULIN UR ELPH-MCNC: 2.9 GM/DL
GLUCOSE SERPL-MCNC: 148 MG/DL (ref 65–99)
POTASSIUM SERPL-SCNC: 4.9 MMOL/L (ref 3.5–5.2)
PROT SERPL-MCNC: 7.2 G/DL (ref 6–8.5)
SODIUM SERPL-SCNC: 159 MMOL/L (ref 136–145)

## 2023-09-12 PROCEDURE — 80053 COMPREHEN METABOLIC PANEL: CPT | Performed by: GENERAL PRACTICE

## 2023-09-15 ENCOUNTER — APPOINTMENT (OUTPATIENT)
Dept: GENERAL RADIOLOGY | Facility: HOSPITAL | Age: 48
End: 2023-09-15
Payer: MEDICARE

## 2023-09-15 ENCOUNTER — HOSPITAL ENCOUNTER (EMERGENCY)
Facility: HOSPITAL | Age: 48
Discharge: HOME | End: 2023-09-16
Attending: EMERGENCY MEDICINE
Payer: MEDICARE

## 2023-09-15 DIAGNOSIS — N39.0 ACUTE UTI (URINARY TRACT INFECTION): ICD-10-CM

## 2023-09-15 DIAGNOSIS — U07.1 COVID-19: Primary | ICD-10-CM

## 2023-09-15 DIAGNOSIS — E87.0 ACUTE HYPERNATREMIA: ICD-10-CM

## 2023-09-15 LAB
ALBUMIN SERPL-MCNC: 3.8 G/DL (ref 3.5–5.2)
ALBUMIN/GLOB SERPL: 1.1 G/DL
ALP SERPL-CCNC: 80 U/L (ref 39–117)
ALT SERPL W P-5'-P-CCNC: 29 U/L (ref 1–33)
ANION GAP SERPL CALCULATED.3IONS-SCNC: 12 MMOL/L (ref 5–15)
AST SERPL-CCNC: 21 U/L (ref 1–32)
BACTERIA UR QL AUTO: ABNORMAL /HPF
BILIRUB SERPL-MCNC: 0.7 MG/DL (ref 0–1.2)
BILIRUB UR QL STRIP: NEGATIVE
BUN SERPL-MCNC: 35 MG/DL (ref 6–20)
BUN/CREAT SERPL: 76.1 (ref 7–25)
CALCIUM SPEC-SCNC: 9.8 MG/DL (ref 8.6–10.5)
CHLORIDE SERPL-SCNC: 115 MMOL/L (ref 98–107)
CLARITY UR: ABNORMAL
CO2 SERPL-SCNC: 25 MMOL/L (ref 22–29)
COLOR UR: YELLOW
CREAT SERPL-MCNC: 0.46 MG/DL (ref 0.57–1)
DEPRECATED RDW RBC AUTO: 41.4 FL (ref 37–54)
EGFRCR SERPLBLD CKD-EPI 2021: 119.7 ML/MIN/1.73
EOSINOPHIL # BLD MANUAL: 0.38 10*3/MM3 (ref 0–0.4)
EOSINOPHIL NFR BLD MANUAL: 4.2 % (ref 0.3–6.2)
ERYTHROCYTE [DISTWIDTH] IN BLOOD BY AUTOMATED COUNT: 12 % (ref 12.3–15.4)
FLUAV RNA RESP QL NAA+PROBE: NOT DETECTED
FLUBV RNA RESP QL NAA+PROBE: NOT DETECTED
GLOBULIN UR ELPH-MCNC: 3.6 GM/DL
GLUCOSE SERPL-MCNC: 126 MG/DL (ref 65–99)
GLUCOSE UR STRIP-MCNC: NEGATIVE MG/DL
HCT VFR BLD AUTO: 40.4 % (ref 34–46.6)
HGB BLD-MCNC: 12.9 G/DL (ref 12–15.9)
HGB UR QL STRIP.AUTO: ABNORMAL
HYALINE CASTS UR QL AUTO: ABNORMAL /LPF
KETONES UR QL STRIP: NEGATIVE
LEUKOCYTE ESTERASE UR QL STRIP.AUTO: ABNORMAL
LYMPHOCYTES # BLD MANUAL: 2.26 10*3/MM3 (ref 0.7–3.1)
LYMPHOCYTES NFR BLD MANUAL: 2.1 % (ref 5–12)
MCH RBC QN AUTO: 30.4 PG (ref 26.6–33)
MCHC RBC AUTO-ENTMCNC: 31.9 G/DL (ref 31.5–35.7)
MCV RBC AUTO: 95.1 FL (ref 79–97)
MONOCYTES # BLD: 0.19 10*3/MM3 (ref 0.1–0.9)
NEUTROPHILS # BLD AUTO: 6.11 10*3/MM3 (ref 1.7–7)
NEUTROPHILS NFR BLD MANUAL: 67.7 % (ref 42.7–76)
NEUTS VAC BLD QL SMEAR: ABNORMAL
NITRITE UR QL STRIP: POSITIVE
PH UR STRIP.AUTO: >=9 [PH] (ref 5–8)
PLASMA CELL PREC NFR BLD MANUAL: 1 % (ref 0–0)
PLAT MORPH BLD: NORMAL
PLATELET # BLD AUTO: 173 10*3/MM3 (ref 140–450)
PMV BLD AUTO: 13.1 FL (ref 6–12)
POTASSIUM SERPL-SCNC: 5 MMOL/L (ref 3.5–5.2)
PROT SERPL-MCNC: 7.4 G/DL (ref 6–8.5)
PROT UR QL STRIP: ABNORMAL
RBC # BLD AUTO: 4.25 10*6/MM3 (ref 3.77–5.28)
RBC # UR STRIP: ABNORMAL /HPF
RBC MORPH BLD: NORMAL
REF LAB TEST METHOD: ABNORMAL
SARS-COV-2 RNA RESP QL NAA+PROBE: DETECTED
SODIUM SERPL-SCNC: 152 MMOL/L (ref 136–145)
SP GR UR STRIP: 1.02 (ref 1–1.03)
SQUAMOUS #/AREA URNS HPF: ABNORMAL /HPF
UROBILINOGEN UR QL STRIP: ABNORMAL
VARIANT LYMPHS NFR BLD MANUAL: 2.1 % (ref 0–5)
VARIANT LYMPHS NFR BLD MANUAL: 22.9 % (ref 19.6–45.3)
WBC # UR STRIP: ABNORMAL /HPF
WBC NRBC COR # BLD: 9.03 10*3/MM3 (ref 3.4–10.8)

## 2023-09-15 PROCEDURE — 85007 BL SMEAR W/DIFF WBC COUNT: CPT | Performed by: EMERGENCY MEDICINE

## 2023-09-15 PROCEDURE — 87086 URINE CULTURE/COLONY COUNT: CPT | Performed by: EMERGENCY MEDICINE

## 2023-09-15 PROCEDURE — P9612 CATHETERIZE FOR URINE SPEC: HCPCS

## 2023-09-15 PROCEDURE — 96361 HYDRATE IV INFUSION ADD-ON: CPT

## 2023-09-15 PROCEDURE — 99283 EMERGENCY DEPT VISIT LOW MDM: CPT

## 2023-09-15 PROCEDURE — 81001 URINALYSIS AUTO W/SCOPE: CPT | Performed by: EMERGENCY MEDICINE

## 2023-09-15 PROCEDURE — 85025 COMPLETE CBC W/AUTO DIFF WBC: CPT | Performed by: EMERGENCY MEDICINE

## 2023-09-15 PROCEDURE — 71045 X-RAY EXAM CHEST 1 VIEW: CPT

## 2023-09-15 PROCEDURE — 96365 THER/PROPH/DIAG IV INF INIT: CPT

## 2023-09-15 PROCEDURE — 80053 COMPREHEN METABOLIC PANEL: CPT | Performed by: EMERGENCY MEDICINE

## 2023-09-15 PROCEDURE — 94799 UNLISTED PULMONARY SVC/PX: CPT

## 2023-09-15 PROCEDURE — 25010000002 LEVOFLOXACIN PER 250 MG: Performed by: EMERGENCY MEDICINE

## 2023-09-15 PROCEDURE — 87636 SARSCOV2 & INF A&B AMP PRB: CPT | Performed by: EMERGENCY MEDICINE

## 2023-09-15 RX ORDER — BACLOFEN 10 MG/1
10 TABLET ORAL ONCE
Status: COMPLETED | OUTPATIENT
Start: 2023-09-15 | End: 2023-09-15

## 2023-09-15 RX ORDER — OXYCODONE HCL 20 MG/ML
5 CONCENTRATE, ORAL ORAL ONCE
Status: COMPLETED | OUTPATIENT
Start: 2023-09-15 | End: 2023-09-15

## 2023-09-15 RX ORDER — LEVOFLOXACIN 5 MG/ML
750 INJECTION, SOLUTION INTRAVENOUS ONCE
Status: COMPLETED | OUTPATIENT
Start: 2023-09-15 | End: 2023-09-15

## 2023-09-15 RX ORDER — DEXTROSE AND SODIUM CHLORIDE 5; .45 G/100ML; G/100ML
75 INJECTION, SOLUTION INTRAVENOUS CONTINUOUS
Status: DISCONTINUED | OUTPATIENT
Start: 2023-09-15 | End: 2023-09-16 | Stop reason: HOSPADM

## 2023-09-15 RX ORDER — LEVOFLOXACIN 500 MG/1
500 TABLET, FILM COATED ORAL DAILY
Qty: 5 TABLET | Refills: 0 | Status: SHIPPED | OUTPATIENT
Start: 2023-09-15 | End: 2023-09-20

## 2023-09-15 RX ADMIN — DEXTROSE AND SODIUM CHLORIDE 75 ML/HR: 5; 450 INJECTION, SOLUTION INTRAVENOUS at 20:15

## 2023-09-15 RX ADMIN — BACLOFEN 10 MG: 10 TABLET ORAL at 23:24

## 2023-09-15 RX ADMIN — OXYCODONE HYDROCHLORIDE 5 MG: 100 SOLUTION ORAL at 23:24

## 2023-09-15 RX ADMIN — LEVOFLOXACIN 750 MG: 750 INJECTION, SOLUTION INTRAVENOUS at 22:09

## 2023-09-15 NOTE — ED PROVIDER NOTES
Subjective   History of Present Illness  Pt sent to the EC from [facility] for elevated sodium levels - this has apparently been noted since 9/7.  Liimted hx available d/t patient with significant mentation deficit at baseline.  No known new medications      Review of Systems   Reason unable to perform ROS: mentation.     Past Medical History:   Diagnosis Date    Angina at rest     Migraine     Sees Pain Management for injections.     MVP (mitral valve prolapse)     Renal disorder     Syncope        Allergies   Allergen Reactions    Coconut Unknown - High Severity    Nuts Unknown - High Severity    Penicillins     Tur[place] Other (See Comments)     Causes migraines per pt reports       Past Surgical History:   Procedure Laterality Date    BREAST BIOPSY Right 2011    benign    INSERTION HEMODIALYSIS CATHETER Left 9/16/2021    Procedure: HEMODIALYSIS CATHETER INSERTION;  Surgeon: Anders Kaur MD;  Location: [place] [address];  Service: Vascular;  Laterality: Left;    TONSILLECTOMY         Family History   Problem Relation Age of Onset    Colon cancer Maternal Aunt 52    Fibromyalgia Mother     Heart disease Mother     Hypertension Mother     Hodgkin's lymphoma Paternal Grandmother     Ovarian cancer Neg Hx     Breast cancer Neg Hx        Social History     Socioeconomic History    Marital status:    Tobacco Use    Smoking status: Never    Smokeless tobacco: Never   Substance and Sexual Activity    Alcohol use: No    Drug use: No           Objective   Physical Exam  Vitals and nursing note reviewed.   Constitutional:       Comments: Pt with chronic contractures/altered mentation     HENT:      Mouth/Throat:      Mouth: Mucous membranes are moist.   Neck:      Comments: Trach in place  Cardiovascular:      Rate and Rhythm: Normal rate and regular rhythm.      Pulses: Normal pulses.      Heart sounds: Normal heart sounds.   Pulmonary:      Effort: Pulmonary effort is normal.      Breath sounds: Rhonchi  present.      Comments: Mild rhonchi noted bilaterally.  Full breath sounds throughout  Abdominal:      General: Abdomen is flat. There is no distension.      Tenderness: There is no abdominal tenderness.   Musculoskeletal:      Comments: Diffuse contractures   Skin:     General: Skin is warm and dry.   Neurological:      Mental Status: She is alert.       Procedures           ED Course      XR Chest 1 View   Final Result   1. Hypoventilation with vascular crowding.   2. No dense infiltrate or effusion.           This report was finalized on 09/15/2023 19:35 by Dr. Valeriy Ambrose MD.        Labs Reviewed   COVID-19 AND FLU A/B, NP SWAB IN TRANSPORT MEDIA 8-12 HR TAT - Abnormal; Notable for the following components:       Result Value    COVID19 Detected (*)     All other components within normal limits    Narrative:     Fact sheet for providers: https://www.fda.gov/media/665909/download    Fact sheet for patients: https://www.fda.gov/media/270613/download    Test performed by PCR.   COMPREHENSIVE METABOLIC PANEL - Abnormal; Notable for the following components:    Glucose 126 (*)     BUN 35 (*)     Creatinine 0.46 (*)     Sodium 152 (*)     Chloride 115 (*)     BUN/Creatinine Ratio 76.1 (*)     All other components within normal limits    Narrative:     GFR Normal >60  Chronic Kidney Disease <60  Kidney Failure <15     URINALYSIS W/ MICROSCOPIC IF INDICATED (NO CULTURE) - Abnormal; Notable for the following components:    Appearance, UA Cloudy (*)     pH, UA >=9.0 (*)     Blood, UA Moderate (2+) (*)     Protein,  mg/dL (2+) (*)     Leuk Esterase, UA Large (3+) (*)     Nitrite, UA Positive (*)     All other components within normal limits   CBC WITH AUTO DIFFERENTIAL - Abnormal; Notable for the following components:    RDW 12.0 (*)     MPV 13.1 (*)     All other components within normal limits   MANUAL DIFFERENTIAL - Abnormal; Notable for the following components:    Monocyte % 2.1 (*)     Plasma Cells % 1.0  (*)     All other components within normal limits   URINALYSIS, MICROSCOPIC ONLY - Abnormal; Notable for the following components:    RBC, UA Too Numerous to Count (*)     WBC, UA Too Numerous to Count (*)     Bacteria, UA 4+ (*)     All other components within normal limits   COVID PRE-OP / PRE-PROCEDURE SCREENING ORDER (NO ISOLATION)    Narrative:     The following orders were created for panel order COVID PRE-OP / PRE-PROCEDURE SCREENING ORDER (NO ISOLATION) - Swab, Nasal Cavity.  Procedure                               Abnormality         Status                     ---------                               -----------         ------                     COVID-19 and FLU A/B PCR...[[id number]]  Abnormal            Final result                 Please view results for these tests on the individual orders.   URINE CULTURE   CBC AND DIFFERENTIAL    Narrative:     The following orders were created for panel order CBC & Differential.  Procedure                               Abnormality         Status                     ---------                               -----------         ------                     CBC Auto Differential[[id number]]        Abnormal            Final result                 Please view results for these tests on the individual orders.                                          Medical Decision Making  Pt stable in EC att.  She does have a UTI and was given 1g IV levaquin.  Also covid + - no respiratory distress.  Pt with + hypernatremia - mild.  Dbt DI.  Possibly medication induced +/- free water loss.  D/w Dr. Pimentel.  At this point - felt pt stable for discharge to return to [facility] with antibiotic and with covid prec.  Recommend nutrition consult for increasing free water and recheck of sodium.  Will d/c to home.  Prec given.     Amount and/or Complexity of Data Reviewed  Labs: ordered.  Radiology: ordered.    Risk  Prescription drug management.        Final diagnoses:   COVID-19   Acute UTI (urinary  tract infection)   Acute hypernatremia       ED Disposition  ED Disposition       ED Disposition   Discharge    Condition   Stable    Comment   --               David Lara MD  [address] [place] [address] [address]  [place] [address] [phone]               Medication List        New Prescriptions      levoFLOXacin 500 MG tablet  Commonly known as: LEVAQUIN  Take 1 tablet by mouth Daily for 5 days.               Where to Get Your Medications        These medications were sent to [id number] - [place], [place] - [address] AT [facility] [address] - [phone]  - [phone] FX  [address], [facility] [address]      Phone: [phone]   levoFLOXacin 500 MG tablet            Jean-Claude Golden, DO  09/15/23 1901       Jean-Claude Golden, DO  09/15/23 8994

## 2023-09-16 VITALS
HEIGHT: 65 IN | SYSTOLIC BLOOD PRESSURE: 128 MMHG | DIASTOLIC BLOOD PRESSURE: 87 MMHG | TEMPERATURE: 99.7 F | WEIGHT: 143.1 LBS | BODY MASS INDEX: 23.84 KG/M2 | RESPIRATION RATE: 16 BRPM | HEART RATE: 116 BPM | OXYGEN SATURATION: 100 %

## 2023-09-16 LAB — BACTERIA SPEC AEROBE CULT: NORMAL

## 2023-09-16 NOTE — DISCHARGE INSTRUCTIONS
Covid-19 test today was positive.  Also noted a urinary tract infection.  Recommend nutrition consult to increased free water intake for hypernatremia.

## 2023-09-18 ENCOUNTER — LAB REQUISITION (OUTPATIENT)
Dept: LAB | Facility: HOSPITAL | Age: 48
End: 2023-09-18
Payer: MEDICARE

## 2023-09-18 LAB
ANION GAP SERPL CALCULATED.3IONS-SCNC: 12 MMOL/L (ref 5–15)
BUN SERPL-MCNC: 32 MG/DL (ref 6–20)
BUN/CREAT SERPL: 54.2 (ref 7–25)
CALCIUM SPEC-SCNC: 10.1 MG/DL (ref 8.6–10.5)
CHLORIDE SERPL-SCNC: 111 MMOL/L (ref 98–107)
CO2 SERPL-SCNC: 24 MMOL/L (ref 22–29)
CREAT SERPL-MCNC: 0.59 MG/DL (ref 0.57–1)
EGFRCR SERPLBLD CKD-EPI 2021: 112.7 ML/MIN/1.73
GLUCOSE SERPL-MCNC: 115 MG/DL (ref 65–99)
POTASSIUM SERPL-SCNC: 4.2 MMOL/L (ref 3.5–5.2)
SODIUM SERPL-SCNC: 147 MMOL/L (ref 136–145)

## 2023-09-18 PROCEDURE — 80048 BASIC METABOLIC PNL TOTAL CA: CPT | Performed by: GENERAL PRACTICE

## 2023-09-26 ENCOUNTER — LAB REQUISITION (OUTPATIENT)
Dept: LAB | Facility: HOSPITAL | Age: 48
End: 2023-09-26
Payer: MEDICARE

## 2023-09-26 LAB
ANION GAP SERPL CALCULATED.3IONS-SCNC: 11 MMOL/L (ref 5–15)
BUN SERPL-MCNC: 29 MG/DL (ref 6–20)
BUN/CREAT SERPL: 49.2 (ref 7–25)
CALCIUM SPEC-SCNC: 9.4 MG/DL (ref 8.6–10.5)
CHLORIDE SERPL-SCNC: 102 MMOL/L (ref 98–107)
CO2 SERPL-SCNC: 25 MMOL/L (ref 22–29)
CREAT SERPL-MCNC: 0.59 MG/DL (ref 0.57–1)
EGFRCR SERPLBLD CKD-EPI 2021: 112.7 ML/MIN/1.73
GLUCOSE SERPL-MCNC: 153 MG/DL (ref 65–99)
POTASSIUM SERPL-SCNC: 3.6 MMOL/L (ref 3.5–5.2)
SODIUM SERPL-SCNC: 138 MMOL/L (ref 136–145)

## 2023-09-26 PROCEDURE — 80048 BASIC METABOLIC PNL TOTAL CA: CPT | Performed by: GENERAL PRACTICE

## 2023-10-01 ENCOUNTER — APPOINTMENT (OUTPATIENT)
Dept: CT IMAGING | Facility: HOSPITAL | Age: 48
End: 2023-10-01
Payer: MEDICARE

## 2023-10-01 ENCOUNTER — HOSPITAL ENCOUNTER (EMERGENCY)
Facility: HOSPITAL | Age: 48
Discharge: HOME | End: 2023-10-01
Attending: FAMILY MEDICINE | Admitting: FAMILY MEDICINE
Payer: MEDICARE

## 2023-10-01 ENCOUNTER — HOSPITAL ENCOUNTER (EMERGENCY)
Facility: HOSPITAL | Age: 48
Discharge: NURSING, REHAB OR LONG-TERM CARE | End: 2023-10-01
Attending: EMERGENCY MEDICINE | Admitting: EMERGENCY MEDICINE
Payer: MEDICARE

## 2023-10-01 ENCOUNTER — APPOINTMENT (OUTPATIENT)
Dept: GENERAL RADIOLOGY | Facility: HOSPITAL | Age: 48
End: 2023-10-01
Payer: MEDICARE

## 2023-10-01 VITALS
BODY MASS INDEX: 23.32 KG/M2 | RESPIRATION RATE: 18 BRPM | HEIGHT: 65 IN | HEART RATE: 84 BPM | WEIGHT: 140 LBS | TEMPERATURE: 99.1 F | DIASTOLIC BLOOD PRESSURE: 82 MMHG | OXYGEN SATURATION: 100 % | SYSTOLIC BLOOD PRESSURE: 119 MMHG

## 2023-10-01 VITALS
HEIGHT: 60 IN | RESPIRATION RATE: 20 BRPM | DIASTOLIC BLOOD PRESSURE: 85 MMHG | SYSTOLIC BLOOD PRESSURE: 115 MMHG | BODY MASS INDEX: 27.48 KG/M2 | TEMPERATURE: 99.9 F | OXYGEN SATURATION: 98 % | HEART RATE: 114 BPM | WEIGHT: 140 LBS

## 2023-10-01 DIAGNOSIS — R34 DECREASED URINATION: Primary | ICD-10-CM

## 2023-10-01 DIAGNOSIS — J95.01 TRACHEOSTOMY HEMORRHAGE: Primary | ICD-10-CM

## 2023-10-01 DIAGNOSIS — R50.9 FEVER IN ADULT: ICD-10-CM

## 2023-10-01 LAB
ALBUMIN SERPL-MCNC: 4 G/DL (ref 3.5–5.2)
ALBUMIN SERPL-MCNC: 4.1 G/DL (ref 3.5–5.2)
ALBUMIN/GLOB SERPL: 1.1 G/DL
ALBUMIN/GLOB SERPL: 1.2 G/DL
ALP SERPL-CCNC: 78 U/L (ref 39–117)
ALP SERPL-CCNC: 81 U/L (ref 39–117)
ALT SERPL W P-5'-P-CCNC: 15 U/L (ref 1–33)
ALT SERPL W P-5'-P-CCNC: 16 U/L (ref 1–33)
ANION GAP SERPL CALCULATED.3IONS-SCNC: 12 MMOL/L (ref 5–15)
ANION GAP SERPL CALCULATED.3IONS-SCNC: 12 MMOL/L (ref 5–15)
AST SERPL-CCNC: 13 U/L (ref 1–32)
AST SERPL-CCNC: 14 U/L (ref 1–32)
BACTERIA UR QL AUTO: ABNORMAL /HPF
BASOPHILS # BLD AUTO: 0.03 10*3/MM3 (ref 0–0.2)
BASOPHILS # BLD AUTO: 0.04 10*3/MM3 (ref 0–0.2)
BASOPHILS NFR BLD AUTO: 0.4 % (ref 0–1.5)
BASOPHILS NFR BLD AUTO: 0.4 % (ref 0–1.5)
BILIRUB SERPL-MCNC: 0.6 MG/DL (ref 0–1.2)
BILIRUB SERPL-MCNC: 0.8 MG/DL (ref 0–1.2)
BILIRUB UR QL STRIP: NEGATIVE
BUN SERPL-MCNC: 27 MG/DL (ref 6–20)
BUN SERPL-MCNC: 30 MG/DL (ref 6–20)
BUN/CREAT SERPL: 50 (ref 7–25)
BUN/CREAT SERPL: 57.7 (ref 7–25)
CALCIUM SPEC-SCNC: 10.1 MG/DL (ref 8.6–10.5)
CALCIUM SPEC-SCNC: 10.1 MG/DL (ref 8.6–10.5)
CHLORIDE SERPL-SCNC: 108 MMOL/L (ref 98–107)
CHLORIDE SERPL-SCNC: 108 MMOL/L (ref 98–107)
CLARITY UR: ABNORMAL
CO2 SERPL-SCNC: 22 MMOL/L (ref 22–29)
CO2 SERPL-SCNC: 24 MMOL/L (ref 22–29)
COLOR UR: YELLOW
CREAT SERPL-MCNC: 0.52 MG/DL (ref 0.57–1)
CREAT SERPL-MCNC: 0.54 MG/DL (ref 0.57–1)
D-LACTATE SERPL-SCNC: 1.1 MMOL/L (ref 0.5–2)
D-LACTATE SERPL-SCNC: 1.4 MMOL/L (ref 0.5–2)
DEPRECATED RDW RBC AUTO: 44.6 FL (ref 37–54)
DEPRECATED RDW RBC AUTO: 45.7 FL (ref 37–54)
EGFRCR SERPLBLD CKD-EPI 2021: 115.2 ML/MIN/1.73
EGFRCR SERPLBLD CKD-EPI 2021: 116.2 ML/MIN/1.73
EOSINOPHIL # BLD AUTO: 0.08 10*3/MM3 (ref 0–0.4)
EOSINOPHIL # BLD AUTO: 0.19 10*3/MM3 (ref 0–0.4)
EOSINOPHIL NFR BLD AUTO: 0.8 % (ref 0.3–6.2)
EOSINOPHIL NFR BLD AUTO: 2.2 % (ref 0.3–6.2)
ERYTHROCYTE [DISTWIDTH] IN BLOOD BY AUTOMATED COUNT: 13.2 % (ref 12.3–15.4)
ERYTHROCYTE [DISTWIDTH] IN BLOOD BY AUTOMATED COUNT: 13.3 % (ref 12.3–15.4)
GLOBULIN UR ELPH-MCNC: 3.4 GM/DL
GLOBULIN UR ELPH-MCNC: 3.6 GM/DL
GLUCOSE SERPL-MCNC: 119 MG/DL (ref 65–99)
GLUCOSE SERPL-MCNC: 126 MG/DL (ref 65–99)
GLUCOSE UR STRIP-MCNC: NEGATIVE MG/DL
HCT VFR BLD AUTO: 35.9 % (ref 34–46.6)
HCT VFR BLD AUTO: 41.4 % (ref 34–46.6)
HGB BLD-MCNC: 11.7 G/DL (ref 12–15.9)
HGB BLD-MCNC: 13 G/DL (ref 12–15.9)
HGB UR QL STRIP.AUTO: ABNORMAL
HYALINE CASTS UR QL AUTO: ABNORMAL /LPF
IMM GRANULOCYTES # BLD AUTO: 0.03 10*3/MM3 (ref 0–0.05)
IMM GRANULOCYTES # BLD AUTO: 0.04 10*3/MM3 (ref 0–0.05)
IMM GRANULOCYTES NFR BLD AUTO: 0.3 % (ref 0–0.5)
IMM GRANULOCYTES NFR BLD AUTO: 0.5 % (ref 0–0.5)
KETONES UR QL STRIP: NEGATIVE
LEUKOCYTE ESTERASE UR QL STRIP.AUTO: ABNORMAL
LYMPHOCYTES # BLD AUTO: 1.45 10*3/MM3 (ref 0.7–3.1)
LYMPHOCYTES # BLD AUTO: 1.64 10*3/MM3 (ref 0.7–3.1)
LYMPHOCYTES NFR BLD AUTO: 16.5 % (ref 19.6–45.3)
LYMPHOCYTES NFR BLD AUTO: 17 % (ref 19.6–45.3)
MCH RBC QN AUTO: 30 PG (ref 26.6–33)
MCH RBC QN AUTO: 30.4 PG (ref 26.6–33)
MCHC RBC AUTO-ENTMCNC: 31.4 G/DL (ref 31.5–35.7)
MCHC RBC AUTO-ENTMCNC: 32.6 G/DL (ref 31.5–35.7)
MCV RBC AUTO: 93.2 FL (ref 79–97)
MCV RBC AUTO: 95.4 FL (ref 79–97)
MONOCYTES # BLD AUTO: 0.77 10*3/MM3 (ref 0.1–0.9)
MONOCYTES # BLD AUTO: 0.77 10*3/MM3 (ref 0.1–0.9)
MONOCYTES NFR BLD AUTO: 7.7 % (ref 5–12)
MONOCYTES NFR BLD AUTO: 9 % (ref 5–12)
NEUTROPHILS NFR BLD AUTO: 6.07 10*3/MM3 (ref 1.7–7)
NEUTROPHILS NFR BLD AUTO: 7.38 10*3/MM3 (ref 1.7–7)
NEUTROPHILS NFR BLD AUTO: 70.9 % (ref 42.7–76)
NEUTROPHILS NFR BLD AUTO: 74.3 % (ref 42.7–76)
NITRITE UR QL STRIP: NEGATIVE
NRBC BLD AUTO-RTO: 0 /100 WBC (ref 0–0.2)
NRBC BLD AUTO-RTO: 0 /100 WBC (ref 0–0.2)
PH UR STRIP.AUTO: >=9 [PH] (ref 5–8)
PLATELET # BLD AUTO: 244 10*3/MM3 (ref 140–450)
PLATELET # BLD AUTO: 246 10*3/MM3 (ref 140–450)
PMV BLD AUTO: 10.6 FL (ref 6–12)
PMV BLD AUTO: 10.7 FL (ref 6–12)
POTASSIUM SERPL-SCNC: 4.2 MMOL/L (ref 3.5–5.2)
POTASSIUM SERPL-SCNC: 4.4 MMOL/L (ref 3.5–5.2)
PROT SERPL-MCNC: 7.4 G/DL (ref 6–8.5)
PROT SERPL-MCNC: 7.7 G/DL (ref 6–8.5)
PROT UR QL STRIP: ABNORMAL
RBC # BLD AUTO: 3.85 10*6/MM3 (ref 3.77–5.28)
RBC # BLD AUTO: 4.34 10*6/MM3 (ref 3.77–5.28)
RBC # UR STRIP: ABNORMAL /HPF
REF LAB TEST METHOD: ABNORMAL
SODIUM SERPL-SCNC: 142 MMOL/L (ref 136–145)
SODIUM SERPL-SCNC: 144 MMOL/L (ref 136–145)
SP GR UR STRIP: 1.02 (ref 1–1.03)
SQUAMOUS #/AREA URNS HPF: ABNORMAL /HPF
UROBILINOGEN UR QL STRIP: ABNORMAL
WBC # UR STRIP: ABNORMAL /HPF
WBC NRBC COR # BLD: 8.55 10*3/MM3 (ref 3.4–10.8)
WBC NRBC COR # BLD: 9.94 10*3/MM3 (ref 3.4–10.8)
YEAST URNS QL MICRO: ABNORMAL /HPF

## 2023-10-01 PROCEDURE — P9612 CATHETERIZE FOR URINE SPEC: HCPCS

## 2023-10-01 PROCEDURE — 87086 URINE CULTURE/COLONY COUNT: CPT | Performed by: FAMILY MEDICINE

## 2023-10-01 PROCEDURE — 36415 COLL VENOUS BLD VENIPUNCTURE: CPT

## 2023-10-01 PROCEDURE — 80053 COMPREHEN METABOLIC PANEL: CPT | Performed by: EMERGENCY MEDICINE

## 2023-10-01 PROCEDURE — 81001 URINALYSIS AUTO W/SCOPE: CPT | Performed by: FAMILY MEDICINE

## 2023-10-01 PROCEDURE — 83605 ASSAY OF LACTIC ACID: CPT | Performed by: EMERGENCY MEDICINE

## 2023-10-01 PROCEDURE — 71045 X-RAY EXAM CHEST 1 VIEW: CPT

## 2023-10-01 PROCEDURE — 80053 COMPREHEN METABOLIC PANEL: CPT | Performed by: FAMILY MEDICINE

## 2023-10-01 PROCEDURE — 85025 COMPLETE CBC W/AUTO DIFF WBC: CPT | Performed by: EMERGENCY MEDICINE

## 2023-10-01 PROCEDURE — 94799 UNLISTED PULMONARY SVC/PX: CPT

## 2023-10-01 PROCEDURE — 99284 EMERGENCY DEPT VISIT MOD MDM: CPT

## 2023-10-01 PROCEDURE — 74176 CT ABD & PELVIS W/O CONTRAST: CPT

## 2023-10-01 PROCEDURE — 87040 BLOOD CULTURE FOR BACTERIA: CPT | Performed by: EMERGENCY MEDICINE

## 2023-10-01 PROCEDURE — 85025 COMPLETE CBC W/AUTO DIFF WBC: CPT | Performed by: FAMILY MEDICINE

## 2023-10-01 PROCEDURE — 83605 ASSAY OF LACTIC ACID: CPT | Performed by: FAMILY MEDICINE

## 2023-10-01 PROCEDURE — 99283 EMERGENCY DEPT VISIT LOW MDM: CPT

## 2023-10-01 RX ORDER — SODIUM CHLORIDE 0.9 % (FLUSH) 0.9 %
10 SYRINGE (ML) INJECTION AS NEEDED
Status: DISCONTINUED | OUTPATIENT
Start: 2023-10-01 | End: 2023-10-01 | Stop reason: HOSPADM

## 2023-10-01 NOTE — ED NOTES
Upon entering the room, this nurse observed the pt was grimacing, and seemed to be choking. So, I called RT to assist in suctioning secretions. RT suctioned the tracheostomy and also used lavage. Moderate amount of secretions were removed. Pt is no longer showing choking/heaving motions. This nurse checked legs, feet, and arms to make sure nothing was rubbing or pinched by the bed rails/blankets. No obvious clues as to what else could cause discomfort. TV turned on and bright lights turned off. Pt resting in bed.

## 2023-10-01 NOTE — ED PROVIDER NOTES
HPI:    Patient is a 48-year-old white female who comes from [facility] who has a urostomy tube was placed by  n July of this year 2023 who presents with decreased urinary output from the urostomy tube for the last 7 hours.  Patient has had intermittent dislodging of the tube and obstructions in the tube in the past.  They sent the patient here just to see if the patient possibly have a dislodged tube or obstructed tube.    REVIEW OF SYSTEMS  Due to patient's status unable to determine all her of her review of systems by answering questions however there is a report of decreased urinary output.    GENITOURINARY: Positive for decreased urinary output last 7 hours.    PAST MEDICAL HISTORY  Past Medical History:   Diagnosis Date    Acute kidney failure, unspecified     Angina at rest     Anoxic brain damage, not elsewhere classified     Chronic pulmonary embolism     Chronic respiratory failure, unspecified whether with hypoxia or hypercapnia     Dependence on respirator (ventilator) status     Gastrointestinal hemorrhage, unspecified     Hypercalcemia     Iron deficiency anemia     Metabolic encephalopathy     Migraine     Sees Pain Management for injections.     MVP (mitral valve prolapse)     Other dysphagia     Other pulmonary embolism with acute cor pulmonale     Renal disorder     Syncope     Urinary tract infection, site not specified        FAMILY HISTORY  Family History   Problem Relation Age of Onset    Colon cancer Maternal Aunt 52    Fibromyalgia Mother     Heart disease Mother     Hypertension Mother     Hodgkin's lymphoma Paternal Grandmother     Ovarian cancer Neg Hx     Breast cancer Neg Hx        SOCIAL HISTORY  Social History     Socioeconomic History    Marital status:    Tobacco Use    Smoking status: Never    Smokeless tobacco: Never   Substance and Sexual Activity    Alcohol use: No    Drug use: No       IMMUNIZATION HISTORY  Deferred to primary care physician.    SURGICAL  HISTORY  Past Surgical History:   Procedure Laterality Date    BREAST BIOPSY Right 2011    benign    INSERTION HEMODIALYSIS CATHETER Left 9/16/2021    Procedure: HEMODIALYSIS CATHETER INSERTION;  Surgeon: Anders Kaur MD;  Location: [place] [address];  Service: Vascular;  Laterality: Left;    TONSILLECTOMY         CURRENT MEDICATIONS  No current facility-administered medications for this encounter.    Current Outpatient Medications:     butalbital-acetaminophen-caffeine (FIORICET, ESGIC) -40 MG per tablet, Take 2 tablets by mouth Every 4 (Four) Hours As Needed for Headache or Migraine., Disp: , Rfl:     Calcium Carbonate-Simethicone 750-80 MG chewable tablet, Chew 1 tablet Daily., Disp: , Rfl:     eszopiclone (LUNESTA) 3 MG tablet, Take 3 mg by mouth Every Night. Take immediately before bedtime, Disp: , Rfl:     FLUoxetine (PROzac) 40 MG capsule, Take 40 mg by mouth Daily., Disp: , Rfl:     lactated ringers infusion, Infuse 75 mL/hr into a venous catheter Continuous., Disp: 1 mL, Rfl: 0    levETIRAcetam in NaCl 0.82% (KEPPRA) 500 MG/100ML solution IVPB, Infuse 100 mL into a venous catheter Every 12 (Twelve) Hours., Disp: 4000 mL, Rfl:     methocarbamol (ROBAXIN) 500 MG tablet, Take 500 mg by mouth 3 (Three) Times a Day As Needed for Muscle Spasms., Disp: , Rfl:     metoclopramide (REGLAN) 5 MG/5ML solution, Take 5 mg by mouth 3 (Three) Times a Day Before Meals., Disp: , Rfl:     Norepinephrine-Sodium Chloride (LEVOPHED) 8-0.9 MG/250ML-% solution infusion, Infuse 1.442-21.63 mcg/min into a venous catheter Dose Adjusted By Provider As Needed., Disp: 1 mL, Rfl: 0    ondansetron ODT (ZOFRAN-ODT) 4 MG disintegrating tablet, Place 4 mg on the tongue 4 (Four) Times a Day As Needed for Nausea or Vomiting., Disp: , Rfl:     oxymetazoline (AFRIN) 0.05 % nasal spray, 2 sprays into the nostril(s) as directed by provider 2 (Two) Times a Day., Disp: , Rfl:     pantoprazole (PROTONIX) 20 MG EC tablet, Take 20 mg  "by mouth Daily., Disp: , Rfl:     prochlorperazine (COMPAZINE) 10 MG tablet, Take 10 mg by mouth Every 8 (Eight) Hours As Needed for Nausea or Vomiting., Disp: , Rfl:     promethazine (PHENERGAN) 25 MG tablet, Take 25-50 mg by mouth Every 6 (Six) Hours As Needed for Nausea or Vomiting., Disp: , Rfl:     propofol (DIPRIVAN) 10 mg/mL emulsion infusion, Infuse 360.5-3,605 mcg/min into a venous catheter Dose Adjusted By Provider As Needed., Disp: , Rfl:     rizatriptan (MAXALT) 10 MG tablet, Take 10 mg by mouth 1 (One) Time As Needed for Migraine. May repeat in 2 hours if needed, Disp: , Rfl:     sodium bicarbonate 650 MG tablet, Take 1 tablet by mouth 2 (Two) Times a Day., Disp: 30 tablet, Rfl: 0    traZODone (DESYREL) 100 MG tablet, Take 100 mg by mouth Every Night., Disp: , Rfl:     ALLERGIES  Allergies   Allergen Reactions    Coconut Unknown - High Severity    Nuts Unknown - High Severity    Penicillins     Tur[place] Other (See Comments)     Causes migraines per pt reports       Genitourinary EXAM    VITAL SIGNS:   /62   Pulse 71   Temp 99.5 °F (37.5 °C) (Axillary)   Resp 19   Ht 165.1 cm (65\")   Wt 63.5 kg (140 lb)   LMP  (LMP Unknown)   SpO2 100%   BMI 23.30 kg/m²     Constitutional: Patient is alert and in no distress.  Patient with unable to determine discomfort.    ENT: There is a normal pharynx with no acute erythema or exudate and oral mucosa is moist.  Nose is clear with no drainage.  Tympanic membranes intact and non-erythemic    Cardiovascular: S1-S2 regular rate and rhythm no murmurs rubs or gallops pulses are equal bilaterally and there is no pitting edema    Respiratory: Patient is clear to auscultation bilaterally with no wheezing or rhonchi.  Chest wall is nontender.  There is no external lesions on the chest.  There is no crepitance    Gastrointestinal: Normal bowel sounds x4 no rebound or guarding no abdominal distention or fluid wave    Genitourinary: Increased urinary    Integument: No " acute lesions noted color appears to be normal.    Wood Coma Scale: Total score 15    Neurological: Patient is alert and oriented x4 in no acute findings noted.  Speech is fluent and cognition is normal.  No evidence of acute CVA.  Cranial nerves II through XII intact.  Patient with normal motor function as well as reflexes and sensation    Psychiatric: Normal affect and mood      RADIOLOGY/PROCEDURES        CT Abdomen Pelvis Without Contrast    (Results Pending)          FUTURE APPOINTMENTS     No future appointments.          Recent Results (from the past 24 hour(s))   Comprehensive Metabolic Panel    Collection Time: 10/01/23  1:25 AM    Specimen: Blood   Result Value Ref Range    Glucose 119 (H) 65 - 99 mg/dL    BUN 30 (H) 6 - 20 mg/dL    Creatinine 0.52 (L) 0.57 - 1.00 mg/dL    Sodium 144 136 - 145 mmol/L    Potassium 4.2 3.5 - 5.2 mmol/L    Chloride 108 (H) 98 - 107 mmol/L    CO2 24.0 22.0 - 29.0 mmol/L    Calcium 10.1 8.6 - 10.5 mg/dL    Total Protein 7.4 6.0 - 8.5 g/dL    Albumin 4.0 3.5 - 5.2 g/dL    ALT (SGPT) 16 1 - 33 U/L    AST (SGOT) 14 1 - 32 U/L    Alkaline Phosphatase 78 39 - 117 U/L    Total Bilirubin 0.8 0.0 - 1.2 mg/dL    Globulin 3.4 gm/dL    A/G Ratio 1.2 g/dL    BUN/Creatinine Ratio 57.7 (H) 7.0 - 25.0    Anion Gap 12.0 5.0 - 15.0 mmol/L    eGFR 116.2 >60.0 mL/min/1.73   Lactic Acid, Plasma    Collection Time: 10/01/23  1:25 AM    Specimen: Blood   Result Value Ref Range    Lactate 1.4 0.5 - 2.0 mmol/L   CBC Auto Differential    Collection Time: 10/01/23  1:25 AM    Specimen: Blood   Result Value Ref Range    WBC 8.55 3.40 - 10.80 10*3/mm3    RBC 3.85 3.77 - 5.28 10*6/mm3    Hemoglobin 11.7 (L) 12.0 - 15.9 g/dL    Hematocrit 35.9 34.0 - 46.6 %    MCV 93.2 79.0 - 97.0 fL    MCH 30.4 26.6 - 33.0 pg    MCHC 32.6 31.5 - 35.7 g/dL    RDW 13.2 12.3 - 15.4 %    RDW-SD 44.6 37.0 - 54.0 fl    MPV 10.7 6.0 - 12.0 fL    Platelets 246 140 - 450 10*3/mm3    Neutrophil % 70.9 42.7 - 76.0 %    Lymphocyte  % 17.0 (L) 19.6 - 45.3 %    Monocyte % 9.0 5.0 - 12.0 %    Eosinophil % 2.2 0.3 - 6.2 %    Basophil % 0.4 0.0 - 1.5 %    Immature Grans % 0.5 0.0 - 0.5 %    Neutrophils, Absolute 6.07 1.70 - 7.00 10*3/mm3    Lymphocytes, Absolute 1.45 0.70 - 3.10 10*3/mm3    Monocytes, Absolute 0.77 0.10 - 0.90 10*3/mm3    Eosinophils, Absolute 0.19 0.00 - 0.40 10*3/mm3    Basophils, Absolute 0.03 0.00 - 0.20 10*3/mm3    Immature Grans, Absolute 0.04 0.00 - 0.05 10*3/mm3    nRBC 0.0 0.0 - 0.2 /100 WBC   Urinalysis With Culture If Indicated - Urine, Catheter    Collection Time: 10/01/23  3:12 AM    Specimen: Urine, Catheter   Result Value Ref Range    Color, UA Yellow Yellow, Straw    Appearance, UA Turbid (A) Clear    pH, UA >=9.0 (H) 5.0 - 8.0    Specific Gravity, UA 1.019 1.005 - 1.030    Glucose, UA Negative Negative    Ketones, UA Negative Negative    Bilirubin, UA Negative Negative    Blood, UA Moderate (2+) (A) Negative    Protein, UA >=300 mg/dL (3+) (A) Negative    Leuk Esterase, UA Large (3+) (A) Negative    Nitrite, UA Negative Negative    Urobilinogen, UA 1.0 E.U./dL 0.2 - 1.0 E.U./dL   Urinalysis, Microscopic Only - Urine, Catheter    Collection Time: 10/01/23  3:12 AM    Specimen: Urine, Catheter   Result Value Ref Range    RBC, UA Too Numerous to Count (A) None Seen /HPF    WBC, UA Too Numerous to Count (A) None Seen /HPF    Bacteria, UA 3+ (A) None Seen /HPF    Squamous Epithelial Cells, UA 0-2 None Seen, 0-2 /HPF    Yeast, UA Small/1+ Yeast None Seen /HPF    Hyaline Casts, UA None Seen None Seen /LPF    Methodology Manual Light Microscopy            COURSE & MEDICAL DECISION MAKING     Patient's partial differential diagnosis can include: Obstructed urostomy tube, dislodged urostomy tube, dehydration, and other      Urinalysis is improved from the last urine in the last year and was mixed oscar.  Will not start treatment for urinary tract infection and wait for the actual urine culture.  CT scan showed that the  urostomy tube was in place.    Patient's level of risk: Moderate        CRITICAL CARE    CRITICAL CARE: No    CRITICAL CARE TIME: None      The patient's last clinical visit to PCP in the [facility] electronic old medical record was reviewed by me:     Also Old charts were reviewed per [facility] EMR.  Pertinent details are summarized above.  All laboratory, radiologic, and EKG studies that were performed in the Emergency Department were a necessary part of the evaluation needed to exclude unstable or  emergent medical conditions:       Patient was hemodynamically and neurologically stable in the ED.   Pertinent studies were reviewed as above.     The patient received:  Medications - No data to display           ED Disposition       ED Disposition   Discharge    Condition   Stable    Comment   --                 Dragon disclaimer:  Part of this note may be an electronic transcription/translation of spoken language to printed text using the Dragon Dictation System. Due to this some dictation errors could occur in the chart.      I have reviewed the patient’s prescription history via a prescription monitoring program.  This information is consistent with my knowledge of the patient’s controlled substance use history.    Patient evaluate during Coronavirus Pandemic. Isolation practices followed according to [facility] policy.    FINAL IMPRESSION   Diagnosis Plan   1. Decreased urination              MD Saleem Terrell Jr, Thomas Mark Jr., MD  10/01/23 0402

## 2023-10-02 NOTE — ED NOTES
Pt arrives from [facility] with co fever and bleeding around trach per facility staff. Upper and lower ext contracted. Nephrostomy tube and stratton cath present, output noted. Pt at baseline per  who is at bedside.

## 2023-10-02 NOTE — ED PROVIDER NOTES
Subjective   History of Present Illness  Patient is sent from the nursing home with report of blood around her tracheostomy site.  EMS says they were worried about some dried blood up there.  She was here last night diagnosed with urinary tract infection put on antibiotics but they sent her back today for recheck on the tracheostomy.  She does have a history of anoxic brain injury and is bedridden and on a permanent tracheostomy with oxygen.    History provided by:  Patient   used: No    Other  Location:  Neck  Quality:  Blood around tracheostomy  Severity:  Mild  Onset quality:  Sudden  Duration:  1 day  Timing:  Constant  Progression:  Unable to specify  Chronicity:  New  Associated symptoms: no abdominal pain, no chest pain, no congestion, no cough, no diarrhea, no ear pain, no fatigue, no fever, no headaches, no loss of consciousness, no myalgias, no nausea, no rash, no rhinorrhea, no shortness of breath, no sore throat, no vomiting and no wheezing      Review of Systems   Constitutional: Negative.  Negative for fatigue and fever.   HENT: Negative.  Negative for congestion, ear pain, rhinorrhea and sore throat.    Respiratory: Negative.  Negative for cough, shortness of breath and wheezing.    Cardiovascular: Negative.  Negative for chest pain.   Gastrointestinal: Negative.  Negative for abdominal pain, diarrhea, nausea and vomiting.   Genitourinary: Negative.    Musculoskeletal: Negative.  Negative for myalgias.   Skin: Negative.  Negative for rash.   Neurological: Negative.  Negative for loss of consciousness and headaches.   Psychiatric/Behavioral: Negative.     All other systems reviewed and are negative.    Past Medical History:   Diagnosis Date    Acute kidney failure, unspecified     Angina at rest     Anoxic brain damage, not elsewhere classified     Chronic pulmonary embolism     Chronic respiratory failure, unspecified whether with hypoxia or hypercapnia     Dependence on respirator  (ventilator) status     Gastrointestinal hemorrhage, unspecified     Hypercalcemia     Iron deficiency anemia     Metabolic encephalopathy     Migraine     Sees Pain Management for injections.     MVP (mitral valve prolapse)     Other dysphagia     Other pulmonary embolism with acute cor pulmonale     Renal disorder     Syncope     Urinary tract infection, site not specified        Allergies   Allergen Reactions    Coconut Unknown - High Severity    Nuts Unknown - High Severity    Penicillins     [place] Other (See Comments)     Causes migraines per pt reports       Past Surgical History:   Procedure Laterality Date    BREAST BIOPSY Right 2011    benign    INSERTION HEMODIALYSIS CATHETER Left 9/16/2021    Procedure: HEMODIALYSIS CATHETER INSERTION;  Surgeon: Anders Kaur MD;  Location: [place] [id number];  Service: Vascular;  Laterality: Left;    TONSILLECTOMY         Family History   Problem Relation Age of Onset    Colon cancer Maternal Aunt 52    Fibromyalgia Mother     Heart disease Mother     Hypertension Mother     Hodgkin's lymphoma Paternal Grandmother     Ovarian cancer Neg Hx     Breast cancer Neg Hx        Social History     Socioeconomic History    Marital status:    Tobacco Use    Smoking status: Never    Smokeless tobacco: Never   Substance and Sexual Activity    Alcohol use: No    Drug use: No       Prior to Admission medications    Medication Sig Start Date End Date Taking? Authorizing Provider   butalbital-acetaminophen-caffeine (FIORICET, ESGIC) -40 MG per tablet Take 2 tablets by mouth Every 4 (Four) Hours As Needed for Headache or Migraine.    Odalys Mullen MD   Calcium Carbonate-Simethicone 750-80 MG chewable tablet Chew 1 tablet Daily.    Odalys Mullen MD   eszopiclone (LUNESTA) 3 MG tablet Take 3 mg by mouth Every Night. Take immediately before bedtime    Odalys Mullen MD   FLUoxetine (PROzac) 40 MG capsule Take 40 mg by mouth Daily.    Provider  MD Odalys   lactated ringers infusion Infuse 75 mL/hr into a venous catheter Continuous. 4/21/22   Brody Potter MD   levETIRAcetam in NaCl 0.82% (KEPPRA) 500 MG/100ML solution IVPB Infuse 100 mL into a venous catheter Every 12 (Twelve) Hours. 4/21/22   Brody Potter MD   methocarbamol (ROBAXIN) 500 MG tablet Take 500 mg by mouth 3 (Three) Times a Day As Needed for Muscle Spasms.    Odalys Mullen MD   metoclopramide (REGLAN) 5 MG/5ML solution Take 5 mg by mouth 3 (Three) Times a Day Before Meals.    Odalys Mullen MD   Norepinephrine-Sodium Chloride (LEVOPHED) 8-0.9 MG/250ML-% solution infusion Infuse 1.442-21.63 mcg/min into a venous catheter Dose Adjusted By Provider As Needed. 4/21/22   Brody Potter MD   ondansetron ODT (ZOFRAN-ODT) 4 MG disintegrating tablet Place 4 mg on the tongue 4 (Four) Times a Day As Needed for Nausea or Vomiting.    Odalys Mullen MD   oxymetazoline (AFRIN) 0.05 % nasal spray 2 sprays into the nostril(s) as directed by provider 2 (Two) Times a Day.    Odalys Mullen MD   pantoprazole (PROTONIX) 20 MG EC tablet Take 20 mg by mouth Daily.    Odalys Mullen MD   prochlorperazine (COMPAZINE) 10 MG tablet Take 10 mg by mouth Every 8 (Eight) Hours As Needed for Nausea or Vomiting.    Odalys Mullen MD   promethazine (PHENERGAN) 25 MG tablet Take 25-50 mg by mouth Every 6 (Six) Hours As Needed for Nausea or Vomiting.    Odalys Mullen MD   propofol (DIPRIVAN) 10 mg/mL emulsion infusion Infuse 360.5-3,605 mcg/min into a venous catheter Dose Adjusted By Provider As Needed. 4/21/22   Brody Potter MD   rizatriptan (MAXALT) 10 MG tablet Take 10 mg by mouth 1 (One) Time As Needed for Migraine. May repeat in 2 hours if needed    Odalys Mullen MD   sodium bicarbonate 650 MG tablet Take 1 tablet by mouth 2 (Two) Times a Day. 4/21/22   Brody Potter MD   traZODone (DESYREL) 100 MG tablet Take 100  mg by mouth Every Night.    Provider, MD Odalys       Medications - No data to display      Vitals:    10/01/23 2133   BP: 115/85   Pulse: 114   Resp:    Temp:    SpO2: 98%         Objective   Physical Exam  Vitals and nursing note reviewed.   Constitutional:       Appearance: She is ill-appearing.      Comments: Bedridden and contracted but this is her baseline state.   HENT:      Head: Normocephalic and atraumatic.   Eyes:      Pupils: Pupils are equal, round, and reactive to light.   Neck:      Comments: Patient does have some dried blood at the top of her tracheostomy but there is no active bleeding noted at the present time.  Cardiovascular:      Rate and Rhythm: Regular rhythm. Tachycardia present.   Pulmonary:      Effort: Pulmonary effort is normal.      Breath sounds: Rhonchi present.   Abdominal:      Palpations: Abdomen is soft.      Tenderness: There is no abdominal tenderness.   Musculoskeletal:      Cervical back: Neck supple.      Comments: Patient is contracted in her extremities chronically.   Skin:     General: Skin is warm and dry.   Neurological:      Mental Status: Mental status is at baseline.      Comments: Nonverbal   Psychiatric:      Comments: Nonverbal.       Procedures         Lab Results (last 24 hours)       Procedure Component Value Units Date/Time    Blood Culture - Blood, Arm, Right [[id number]] Collected: 10/01/23 2000    Specimen: Blood from Arm, Right Updated: 10/01/23 2032    CBC & Differential [[id number]]  (Abnormal) Collected: 10/01/23 2021    Specimen: Blood Updated: 10/01/23 2034    Narrative:      The following orders were created for panel order CBC & Differential.  Procedure                               Abnormality         Status                     ---------                               -----------         ------                     CBC Auto Differential[[id number]]        Abnormal            Final result                 Please view results for these tests on the  individual orders.    Comprehensive Metabolic Panel [[id number]]  (Abnormal) Collected: 10/01/23 2021    Specimen: Blood Updated: 10/01/23 2050     Glucose 126 mg/dL      BUN 27 mg/dL      Creatinine 0.54 mg/dL      Sodium 142 mmol/L      Potassium 4.4 mmol/L      Chloride 108 mmol/L      CO2 22.0 mmol/L      Calcium 10.1 mg/dL      Total Protein 7.7 g/dL      Albumin 4.1 g/dL      ALT (SGPT) 15 U/L      AST (SGOT) 13 U/L      Alkaline Phosphatase 81 U/L      Total Bilirubin 0.6 mg/dL      Globulin 3.6 gm/dL      A/G Ratio 1.1 g/dL      BUN/Creatinine Ratio 50.0     Anion Gap 12.0 mmol/L      eGFR 115.2 mL/min/1.73     Narrative:      GFR Normal >60  Chronic Kidney Disease <60  Kidney Failure <15      Lactic Acid, Plasma [[id number]]  (Normal) Collected: 10/01/23 2021    Specimen: Blood Updated: 10/01/23 2048     Lactate 1.1 mmol/L     CBC Auto Differential [[id number]]  (Abnormal) Collected: 10/01/23 2021    Specimen: Blood Updated: 10/01/23 2034     WBC 9.94 10*3/mm3      RBC 4.34 10*6/mm3      Hemoglobin 13.0 g/dL      Hematocrit 41.4 %      MCV 95.4 fL      MCH 30.0 pg      MCHC 31.4 g/dL      RDW 13.3 %      RDW-SD 45.7 fl      MPV 10.6 fL      Platelets 244 10*3/mm3      Neutrophil % 74.3 %      Lymphocyte % 16.5 %      Monocyte % 7.7 %      Eosinophil % 0.8 %      Basophil % 0.4 %      Immature Grans % 0.3 %      Neutrophils, Absolute 7.38 10*3/mm3      Lymphocytes, Absolute 1.64 10*3/mm3      Monocytes, Absolute 0.77 10*3/mm3      Eosinophils, Absolute 0.08 10*3/mm3      Basophils, Absolute 0.04 10*3/mm3      Immature Grans, Absolute 0.03 10*3/mm3      nRBC 0.0 /100 WBC     Blood Culture - Blood, Arm, Left [[id number]] Collected: 10/01/23 2024    Specimen: Blood from Arm, Left Updated: 10/01/23 2032            XR Chest 1 View   Final Result   Pulmonary hypoventilation with no acute infiltrates. Stable   satisfactory position of the tracheostomy tube.       This report was signed and finalized on 10/1/2023 8:52  PM CDT by Dr. Florian Laurent MD.              ED Course          MDM  Number of Diagnoses or Management Options  Fever in adult: new and requires workup  Tracheostomy hemorrhage: new and requires workup  Diagnosis management comments: Patient's ER evaluation is at baseline.  I will see a definite source for the fever other than the previously known UTI which is already being treated.  There is no significant hemorrhage right now.   is here now and tells me that she called very severely and this was out of the blood afterwards he was worried that something may have shifted or dislodged but everything seems to be functioning fine right now.  We did clean the trachea out.  We did not replace it because it was functioning well.  She is discharged back to the nursing home.       Amount and/or Complexity of Data Reviewed  Clinical lab tests: ordered and reviewed  Tests in the radiology section of CPT®: ordered and reviewed    Risk of Complications, Morbidity, and/or Mortality  Presenting problems: moderate  Diagnostic procedures: moderate  Management options: moderate    Patient Progress  Patient progress: stable      Final diagnoses:   Tracheostomy hemorrhage   Fever in adult          Clif Anne Jr., MD  10/02/23 1051

## 2023-10-03 LAB — BACTERIA SPEC AEROBE CULT: ABNORMAL

## 2023-10-06 LAB
BACTERIA SPEC AEROBE CULT: NORMAL
BACTERIA SPEC AEROBE CULT: NORMAL

## 2023-10-08 ENCOUNTER — APPOINTMENT (OUTPATIENT)
Dept: CT IMAGING | Facility: HOSPITAL | Age: 48
DRG: 698 | End: 2023-10-08
Payer: MEDICARE

## 2023-10-08 ENCOUNTER — HOSPITAL ENCOUNTER (INPATIENT)
Facility: HOSPITAL | Age: 48
LOS: 1 days | Discharge: ANOTHER HOSPITAL | DRG: 698 | End: 2023-10-09
Attending: FAMILY MEDICINE | Admitting: FAMILY MEDICINE
Payer: MEDICARE

## 2023-10-08 ENCOUNTER — APPOINTMENT (OUTPATIENT)
Dept: GENERAL RADIOLOGY | Facility: HOSPITAL | Age: 48
DRG: 698 | End: 2023-10-08
Payer: MEDICARE

## 2023-10-08 DIAGNOSIS — E87.0 HYPERNATREMIA: ICD-10-CM

## 2023-10-08 DIAGNOSIS — N12 PYELONEPHRITIS: Primary | ICD-10-CM

## 2023-10-08 DIAGNOSIS — T85.528A GASTROJEJUNOSTOMY TUBE DISLODGEMENT: ICD-10-CM

## 2023-10-08 DIAGNOSIS — A41.9 SEPSIS, DUE TO UNSPECIFIED ORGANISM, UNSPECIFIED WHETHER ACUTE ORGAN DYSFUNCTION PRESENT: ICD-10-CM

## 2023-10-08 PROBLEM — R13.10 DYSPHAGIA: Status: ACTIVE | Noted: 2023-10-08

## 2023-10-08 PROBLEM — K94.23 PEG TUBE MALFUNCTION: Status: ACTIVE | Noted: 2023-10-08

## 2023-10-08 PROBLEM — Z93.0 TRACHEOSTOMY PRESENT: Status: ACTIVE | Noted: 2023-10-08

## 2023-10-08 PROBLEM — R53.2 FUNCTIONAL QUADRIPLEGIA: Status: ACTIVE | Noted: 2023-10-08

## 2023-10-08 PROBLEM — G93.1 ANOXIC BRAIN INJURY: Status: ACTIVE | Noted: 2023-10-08

## 2023-10-08 LAB
ALBUMIN SERPL-MCNC: 4.1 G/DL (ref 3.5–5.2)
ALBUMIN/GLOB SERPL: 1.2 G/DL
ALP SERPL-CCNC: 67 U/L (ref 39–117)
ALT SERPL W P-5'-P-CCNC: 20 U/L (ref 1–33)
ANION GAP SERPL CALCULATED.3IONS-SCNC: 11 MMOL/L (ref 5–15)
AST SERPL-CCNC: 14 U/L (ref 1–32)
BACTERIA UR QL AUTO: ABNORMAL /HPF
BASOPHILS # BLD AUTO: 0.08 10*3/MM3 (ref 0–0.2)
BASOPHILS NFR BLD AUTO: 0.7 % (ref 0–1.5)
BILIRUB SERPL-MCNC: 0.8 MG/DL (ref 0–1.2)
BILIRUB UR QL STRIP: NEGATIVE
BUN SERPL-MCNC: 63 MG/DL (ref 6–20)
BUN/CREAT SERPL: 67 (ref 7–25)
CALCIUM SPEC-SCNC: 10.1 MG/DL (ref 8.6–10.5)
CHLORIDE SERPL-SCNC: 122 MMOL/L (ref 98–107)
CLARITY UR: ABNORMAL
CO2 SERPL-SCNC: 27 MMOL/L (ref 22–29)
COLOR UR: ABNORMAL
CREAT SERPL-MCNC: 0.94 MG/DL (ref 0.57–1)
D-LACTATE SERPL-SCNC: 1.6 MMOL/L (ref 0.5–2)
DEPRECATED RDW RBC AUTO: 48.5 FL (ref 37–54)
EGFRCR SERPLBLD CKD-EPI 2021: 75.9 ML/MIN/1.73
EOSINOPHIL # BLD AUTO: 0.12 10*3/MM3 (ref 0–0.4)
EOSINOPHIL NFR BLD AUTO: 1.1 % (ref 0.3–6.2)
ERYTHROCYTE [DISTWIDTH] IN BLOOD BY AUTOMATED COUNT: 13.5 % (ref 12.3–15.4)
FLUAV RNA RESP QL NAA+PROBE: NOT DETECTED
FLUBV RNA RESP QL NAA+PROBE: NOT DETECTED
GLOBULIN UR ELPH-MCNC: 3.4 GM/DL
GLUCOSE SERPL-MCNC: 131 MG/DL (ref 65–99)
GLUCOSE UR STRIP-MCNC: NEGATIVE MG/DL
HCG SERPL QL: NEGATIVE
HCT VFR BLD AUTO: 42.8 % (ref 34–46.6)
HGB BLD-MCNC: 13.3 G/DL (ref 12–15.9)
HGB UR QL STRIP.AUTO: ABNORMAL
HOLD SPECIMEN: NORMAL
HYALINE CASTS UR QL AUTO: ABNORMAL /LPF
IMM GRANULOCYTES # BLD AUTO: 0.04 10*3/MM3 (ref 0–0.05)
IMM GRANULOCYTES NFR BLD AUTO: 0.4 % (ref 0–0.5)
KETONES UR QL STRIP: NEGATIVE
LEUKOCYTE ESTERASE UR QL STRIP.AUTO: ABNORMAL
LYMPHOCYTES # BLD AUTO: 2.28 10*3/MM3 (ref 0.7–3.1)
LYMPHOCYTES NFR BLD AUTO: 20.4 % (ref 19.6–45.3)
MCH RBC QN AUTO: 30.4 PG (ref 26.6–33)
MCHC RBC AUTO-ENTMCNC: 31.1 G/DL (ref 31.5–35.7)
MCV RBC AUTO: 97.7 FL (ref 79–97)
MONOCYTES # BLD AUTO: 0.93 10*3/MM3 (ref 0.1–0.9)
MONOCYTES NFR BLD AUTO: 8.3 % (ref 5–12)
MUCOUS THREADS URNS QL MICRO: ABNORMAL /HPF
NEUTROPHILS NFR BLD AUTO: 69.1 % (ref 42.7–76)
NEUTROPHILS NFR BLD AUTO: 7.73 10*3/MM3 (ref 1.7–7)
NITRITE UR QL STRIP: POSITIVE
NRBC BLD AUTO-RTO: 0 /100 WBC (ref 0–0.2)
PH UR STRIP.AUTO: 8.5 [PH] (ref 5–8)
PLATELET # BLD AUTO: 245 10*3/MM3 (ref 140–450)
PMV BLD AUTO: 12.1 FL (ref 6–12)
POTASSIUM SERPL-SCNC: 3.8 MMOL/L (ref 3.5–5.2)
PROCALCITONIN SERPL-MCNC: 0.27 NG/ML (ref 0–0.25)
PROT SERPL-MCNC: 7.5 G/DL (ref 6–8.5)
PROT UR QL STRIP: ABNORMAL
RBC # BLD AUTO: 4.38 10*6/MM3 (ref 3.77–5.28)
RBC # UR STRIP: ABNORMAL /HPF
REF LAB TEST METHOD: ABNORMAL
SARS-COV-2 RNA RESP QL NAA+PROBE: NOT DETECTED
SODIUM SERPL-SCNC: 160 MMOL/L (ref 136–145)
SP GR UR STRIP: 1.02 (ref 1–1.03)
SQUAMOUS #/AREA URNS HPF: ABNORMAL /HPF
TRI-PHOS CRY URNS QL MICRO: ABNORMAL /HPF
UROBILINOGEN UR QL STRIP: ABNORMAL
WBC # UR STRIP: ABNORMAL /HPF
WBC NRBC COR # BLD: 11.18 10*3/MM3 (ref 3.4–10.8)
WHOLE BLOOD HOLD COAG: NORMAL
WHOLE BLOOD HOLD SPECIMEN: NORMAL

## 2023-10-08 PROCEDURE — 80053 COMPREHEN METABOLIC PANEL: CPT

## 2023-10-08 PROCEDURE — 87086 URINE CULTURE/COLONY COUNT: CPT

## 2023-10-08 PROCEDURE — 85025 COMPLETE CBC W/AUTO DIFF WBC: CPT

## 2023-10-08 PROCEDURE — 81001 URINALYSIS AUTO W/SCOPE: CPT

## 2023-10-08 PROCEDURE — 87636 SARSCOV2 & INF A&B AMP PRB: CPT

## 2023-10-08 PROCEDURE — 25510000001 IOPAMIDOL 61 % SOLUTION

## 2023-10-08 PROCEDURE — 94799 UNLISTED PULMONARY SVC/PX: CPT

## 2023-10-08 PROCEDURE — 87186 SC STD MICRODIL/AGAR DIL: CPT

## 2023-10-08 PROCEDURE — 94761 N-INVAS EAR/PLS OXIMETRY MLT: CPT

## 2023-10-08 PROCEDURE — 99285 EMERGENCY DEPT VISIT HI MDM: CPT

## 2023-10-08 PROCEDURE — 25810000003 SODIUM CHLORIDE 0.9 % SOLUTION

## 2023-10-08 PROCEDURE — 87040 BLOOD CULTURE FOR BACTERIA: CPT | Performed by: INTERNAL MEDICINE

## 2023-10-08 PROCEDURE — 87077 CULTURE AEROBIC IDENTIFY: CPT

## 2023-10-08 PROCEDURE — 84703 CHORIONIC GONADOTROPIN ASSAY: CPT | Performed by: INTERNAL MEDICINE

## 2023-10-08 PROCEDURE — 71045 X-RAY EXAM CHEST 1 VIEW: CPT

## 2023-10-08 PROCEDURE — 87040 BLOOD CULTURE FOR BACTERIA: CPT

## 2023-10-08 PROCEDURE — 84145 PROCALCITONIN (PCT): CPT

## 2023-10-08 PROCEDURE — 83605 ASSAY OF LACTIC ACID: CPT

## 2023-10-08 PROCEDURE — 74177 CT ABD & PELVIS W/CONTRAST: CPT

## 2023-10-08 PROCEDURE — 36415 COLL VENOUS BLD VENIPUNCTURE: CPT

## 2023-10-08 PROCEDURE — 25010000002 LEVOFLOXACIN PER 250 MG

## 2023-10-08 PROCEDURE — 25810000003 SODIUM CHLORIDE 0.9 % SOLUTION: Performed by: FAMILY MEDICINE

## 2023-10-08 RX ORDER — BISACODYL 5 MG/1
5 TABLET, DELAYED RELEASE ORAL DAILY PRN
Status: DISCONTINUED | OUTPATIENT
Start: 2023-10-08 | End: 2023-10-09 | Stop reason: HOSPADM

## 2023-10-08 RX ORDER — CHLORHEXIDINE GLUCONATE 500 MG/1
1 CLOTH TOPICAL ONCE
Status: COMPLETED | OUTPATIENT
Start: 2023-10-08 | End: 2023-10-08

## 2023-10-08 RX ORDER — FAMOTIDINE 10 MG/ML
20 INJECTION, SOLUTION INTRAVENOUS EVERY 12 HOURS SCHEDULED
Status: DISCONTINUED | OUTPATIENT
Start: 2023-10-08 | End: 2023-10-09 | Stop reason: HOSPADM

## 2023-10-08 RX ORDER — LEVOFLOXACIN 5 MG/ML
750 INJECTION, SOLUTION INTRAVENOUS ONCE
Status: COMPLETED | OUTPATIENT
Start: 2023-10-08 | End: 2023-10-08

## 2023-10-08 RX ORDER — ACETAMINOPHEN 650 MG/1
650 SUPPOSITORY RECTAL EVERY 4 HOURS PRN
Status: DISCONTINUED | OUTPATIENT
Start: 2023-10-08 | End: 2023-10-09 | Stop reason: HOSPADM

## 2023-10-08 RX ORDER — LEVOFLOXACIN 5 MG/ML
750 INJECTION, SOLUTION INTRAVENOUS EVERY 24 HOURS
Qty: 900 ML | Refills: 0 | Status: DISCONTINUED | OUTPATIENT
Start: 2023-10-09 | End: 2023-10-09 | Stop reason: HOSPADM

## 2023-10-08 RX ORDER — HYDROMORPHONE HYDROCHLORIDE 1 MG/ML
0.5 INJECTION, SOLUTION INTRAMUSCULAR; INTRAVENOUS; SUBCUTANEOUS ONCE
Status: DISCONTINUED | OUTPATIENT
Start: 2023-10-08 | End: 2023-10-08

## 2023-10-08 RX ORDER — ENOXAPARIN SODIUM 100 MG/ML
40 INJECTION SUBCUTANEOUS DAILY
Status: DISCONTINUED | OUTPATIENT
Start: 2023-10-09 | End: 2023-10-09 | Stop reason: HOSPADM

## 2023-10-08 RX ORDER — POLYETHYLENE GLYCOL 3350 17 G/17G
17 POWDER, FOR SOLUTION ORAL DAILY PRN
Status: DISCONTINUED | OUTPATIENT
Start: 2023-10-08 | End: 2023-10-09 | Stop reason: HOSPADM

## 2023-10-08 RX ORDER — CHLORHEXIDINE GLUCONATE 500 MG/1
1 CLOTH TOPICAL EVERY 24 HOURS
Status: DISCONTINUED | OUTPATIENT
Start: 2023-10-09 | End: 2023-10-09 | Stop reason: HOSPADM

## 2023-10-08 RX ORDER — ACETAMINOPHEN 325 MG/1
650 TABLET ORAL EVERY 4 HOURS PRN
Status: DISCONTINUED | OUTPATIENT
Start: 2023-10-08 | End: 2023-10-09 | Stop reason: HOSPADM

## 2023-10-08 RX ORDER — AMOXICILLIN 250 MG
2 CAPSULE ORAL 2 TIMES DAILY
Status: DISCONTINUED | OUTPATIENT
Start: 2023-10-09 | End: 2023-10-09 | Stop reason: HOSPADM

## 2023-10-08 RX ORDER — SODIUM CHLORIDE 9 MG/ML
125 INJECTION, SOLUTION INTRAVENOUS CONTINUOUS
Status: DISCONTINUED | OUTPATIENT
Start: 2023-10-08 | End: 2023-10-09

## 2023-10-08 RX ORDER — SODIUM CHLORIDE 0.9 % (FLUSH) 0.9 %
10 SYRINGE (ML) INJECTION AS NEEDED
Status: DISCONTINUED | OUTPATIENT
Start: 2023-10-08 | End: 2023-10-09 | Stop reason: HOSPADM

## 2023-10-08 RX ORDER — SODIUM CHLORIDE 0.9 % (FLUSH) 0.9 %
10 SYRINGE (ML) INJECTION EVERY 12 HOURS SCHEDULED
Status: DISCONTINUED | OUTPATIENT
Start: 2023-10-08 | End: 2023-10-09 | Stop reason: HOSPADM

## 2023-10-08 RX ORDER — MORPHINE SULFATE 2 MG/ML
2 INJECTION, SOLUTION INTRAMUSCULAR; INTRAVENOUS
Status: DISCONTINUED | OUTPATIENT
Start: 2023-10-08 | End: 2023-10-09 | Stop reason: HOSPADM

## 2023-10-08 RX ORDER — NITROGLYCERIN 0.4 MG/1
0.4 TABLET SUBLINGUAL
Status: DISCONTINUED | OUTPATIENT
Start: 2023-10-08 | End: 2023-10-09 | Stop reason: HOSPADM

## 2023-10-08 RX ORDER — ACETAMINOPHEN 650 MG/1
650 SUPPOSITORY RECTAL ONCE
Status: COMPLETED | OUTPATIENT
Start: 2023-10-08 | End: 2023-10-08

## 2023-10-08 RX ORDER — SODIUM CHLORIDE 9 MG/ML
40 INJECTION, SOLUTION INTRAVENOUS AS NEEDED
Status: DISCONTINUED | OUTPATIENT
Start: 2023-10-08 | End: 2023-10-09 | Stop reason: HOSPADM

## 2023-10-08 RX ORDER — BISACODYL 10 MG
10 SUPPOSITORY, RECTAL RECTAL DAILY PRN
Status: DISCONTINUED | OUTPATIENT
Start: 2023-10-08 | End: 2023-10-09 | Stop reason: HOSPADM

## 2023-10-08 RX ADMIN — Medication 1 APPLICATION: at 22:47

## 2023-10-08 RX ADMIN — SODIUM CHLORIDE 1000 ML: 9 INJECTION, SOLUTION INTRAVENOUS at 18:43

## 2023-10-08 RX ADMIN — FAMOTIDINE 20 MG: 10 INJECTION INTRAVENOUS at 22:47

## 2023-10-08 RX ADMIN — IOPAMIDOL 100 ML: 612 INJECTION, SOLUTION INTRAVENOUS at 21:55

## 2023-10-08 RX ADMIN — CHLORHEXIDINE GLUCONATE 1 APPLICATION: 500 CLOTH TOPICAL at 22:40

## 2023-10-08 RX ADMIN — LEVOFLOXACIN 750 MG: 750 INJECTION, SOLUTION INTRAVENOUS at 20:48

## 2023-10-08 RX ADMIN — ACETAMINOPHEN 650 MG: 650 SUPPOSITORY RECTAL at 18:55

## 2023-10-08 RX ADMIN — SODIUM CHLORIDE 1000 ML: 9 INJECTION, SOLUTION INTRAVENOUS at 21:07

## 2023-10-08 RX ADMIN — Medication 10 ML: at 22:40

## 2023-10-08 RX ADMIN — SODIUM CHLORIDE 125 ML/HR: 9 INJECTION, SOLUTION INTRAVENOUS at 22:40

## 2023-10-08 NOTE — ED PROVIDER NOTES
Subjective   History of Present Illness  Patient is a 48-year-old female who presents to the ED today from [facility] for a displaced G-tube.  Per our nursing staff the nursing home is unsure of the size or type of G-tube that the patient had in place.  Family is present at the bedside and does not have a G-tube here with him.  He is also unaware of the size needed.  Patient is nonverbal, family member and our nursing staff are providing all of the history.  The patient presents tachycardic at 116 bpm with a rectal temp of 100.4.  She does have a chronic Mojica catheter in place, on examination of the back there is a large amount of dark yellow sediment noted.  Unsure when the last catheter change was performed.  The patient is contracted in the bed, this is her baseline.  She is essentially a total care patient.  Lung sounds are clear throughout bilaterally.  She does have a chronic nephrostomy tube as well.  PMH is significant for JUVENAL, anoxic brain damage, chronic PE, chronic respiratory failure, iron deficiency anemia, MVP, frequent UTIs, and tracheostomy.  Differential diagnoses: Displaced G-tube, urosepsis, pneumonia, COVID, electrolyte imbalance, and other.    History provided by:  Relative  History limited by:  Patient nonverbal   used: No        Review of Systems   Constitutional:  Positive for fever. Negative for diaphoresis.   Respiratory:  Negative for cough.    Cardiovascular:  Negative for leg swelling.   Gastrointestinal:  Negative for diarrhea and vomiting.   Musculoskeletal: Negative.    Skin:  Positive for wound.       Past Medical History:   Diagnosis Date    Acute kidney failure, unspecified     Angina at rest     Anoxic brain damage, not elsewhere classified     Chronic pulmonary embolism     Chronic respiratory failure, unspecified whether with hypoxia or hypercapnia     Dependence on respirator (ventilator) status     Gastrointestinal hemorrhage, unspecified      Hypercalcemia     Iron deficiency anemia     Metabolic encephalopathy     Migraine     Sees Pain Management for injections.     MVP (mitral valve prolapse)     Other dysphagia     Other pulmonary embolism with acute cor pulmonale     Renal disorder     Syncope     Urinary tract infection, site not specified        Allergies   Allergen Reactions    Coconut Unknown - High Severity    Nuts Unknown - High Severity    Penicillins     Turkey Other (See Comments)     Causes migraines per pt reports       Past Surgical History:   Procedure Laterality Date    BREAST BIOPSY Right 2011    benign    INSERTION HEMODIALYSIS CATHETER Left 9/16/2021    Procedure: HEMODIALYSIS CATHETER INSERTION;  Surgeon: Anders Kaur MD;  Location: [place] [id number];  Service: Vascular;  Laterality: Left;    TONSILLECTOMY         Family History   Problem Relation Age of Onset    Colon cancer Maternal Aunt 52    Fibromyalgia Mother     Heart disease Mother     Hypertension Mother     Hodgkin's lymphoma Paternal Grandmother     Ovarian cancer Neg Hx     Breast cancer Neg Hx        Social History     Socioeconomic History    Marital status:    Tobacco Use    Smoking status: Never    Smokeless tobacco: Never   Vaping Use    Vaping Use: Never used   Substance and Sexual Activity    Alcohol use: No    Drug use: No       Objective   Physical Exam  Vitals and nursing note reviewed.   Constitutional:       Appearance: She is toxic-appearing. She is not diaphoretic.      Comments: Appears chronically ill   HENT:      Head: Normocephalic and atraumatic.      Right Ear: External ear normal.      Left Ear: External ear normal.      Nose: Nose normal.   Eyes:      Extraocular Movements: Extraocular movements intact.      Conjunctiva/sclera: Conjunctivae normal.      Pupils: Pupils are equal, round, and reactive to light.   Neck:      Comments: Trach present  Cardiovascular:      Rate and Rhythm: Normal rate and regular rhythm.      Pulses:  Normal pulses.      Heart sounds: Normal heart sounds.   Pulmonary:      Effort: Pulmonary effort is normal.      Breath sounds: Normal breath sounds.      Comments: Lung sounds clear throughout bilaterally  Abdominal:          Comments: G-tube site with no G-tube present, left nephrostomy tube present   Skin:     General: Skin is warm and dry.      Capillary Refill: Capillary refill takes less than 2 seconds.   Neurological:      Mental Status: She is alert. Mental status is at baseline.      GCS: GCS eye subscore is 4. GCS verbal subscore is 1. GCS motor subscore is 4.       Labs Reviewed   COMPREHENSIVE METABOLIC PANEL - Abnormal; Notable for the following components:       Result Value    Glucose 131 (*)     BUN 63 (*)     Sodium 160 (*)     Chloride 122 (*)     BUN/Creatinine Ratio 67.0 (*)     All other components within normal limits    Narrative:     GFR Normal >60  Chronic Kidney Disease <60  Kidney Failure <15     URINALYSIS W/ CULTURE IF INDICATED - Abnormal; Notable for the following components:    Color, UA Orange (*)     Appearance, UA Turbid (*)     pH, UA 8.5 (*)     Blood, UA Large (3+) (*)     Protein, UA >=300 mg/dL (3+) (*)     Leuk Esterase, UA Large (3+) (*)     Nitrite, UA Positive (*)     All other components within normal limits    Narrative:     Dipstick results may be inaccurate due to color interference.   PROCALCITONIN - Abnormal; Notable for the following components:    Procalcitonin 0.27 (*)     All other components within normal limits    Narrative:     As a Marker for Sepsis (Non-Neonates):    1. <0.5 ng/mL represents a low risk of severe sepsis and/or septic shock.  2. >2 ng/mL represents a high risk of severe sepsis and/or septic shock.    As a Marker for Lower Respiratory Tract Infections that require antibiotic therapy:    PCT on Admission    Antibiotic Therapy       6-12 Hrs later    >0.5                Strongly Recommended  >0.25 - <0.5        Recommended   0.1 - 0.25           "Discouraged              Remeasure/reassess PCT  <0.1                Strongly Discouraged     Remeasure/reassess PCT    As 28 day mortality risk marker: \"Change in Procalcitonin Result\" (>80% or <=80%) if Day 0 (or Day 1) and Day 4 values are available. Refer to http://[email or web address]-pct-calculator.com    Change in PCT <=80%  A decrease of PCT levels below or equal to 80% defines a positive change in PCT test result representing a higher risk for 28-day all-cause mortality of patients diagnosed with severe sepsis for septic shock.    Change in PCT >80%  A decrease of PCT levels of more than 80% defines a negative change in PCT result representing a lower risk for 28-day all-cause mortality of patients diagnosed with severe sepsis or septic shock.      CBC WITH AUTO DIFFERENTIAL - Abnormal; Notable for the following components:    WBC 11.18 (*)     MCV 97.7 (*)     MCHC 31.1 (*)     MPV 12.1 (*)     Neutrophils, Absolute 7.73 (*)     Monocytes, Absolute 0.93 (*)     All other components within normal limits   URINALYSIS, MICROSCOPIC ONLY - Abnormal; Notable for the following components:    RBC, UA Too Numerous to Count (*)     WBC, UA Too Numerous to Count (*)     Bacteria, UA 4+ (*)     Mucus, UA Small/1+ (*)     All other components within normal limits   COVID-19 AND FLU A/B, NP SWAB IN TRANSPORT MEDIA 8-12 HR TAT - Normal    Narrative:     Fact sheet for providers: https://www.fda.gov/media/254410/download    Fact sheet for patients: https://www.fda.gov/media/400045/download    Test performed by PCR.  Fact sheet for providers: https://www.fda.gov/media/279972/download    Fact sheet for patients: https://www.fda.gov/media/866443/download    Test performed by PCR.   LACTIC ACID, PLASMA - Normal   HCG, SERUM, QUALITATIVE - Normal   COVID PRE-OP / PRE-PROCEDURE SCREENING ORDER (NO ISOLATION)    Narrative:     The following orders were created for panel order COVID PRE-OP / PRE-PROCEDURE SCREENING ORDER (NO ISOLATION) - " Swab, Nasopharynx.  Procedure                               Abnormality         Status                     ---------                               -----------         ------                     COVID-19 and FLU A/B PCR...[[id number]]  Normal              Final result                 Please view results for these tests on the individual orders.   BLOOD CULTURE   BLOOD CULTURE   URINE CULTURE   RAINBOW DRAW    Narrative:     The following orders were created for panel order Pearson Draw.  Procedure                               Abnormality         Status                     ---------                               -----------         ------                     Green Top (Gel)[[id number]]                                  Final result               Lavender Top[[id number]]                                     Final result               Red Top[[id number]]                                          Final result               Pearson Blood Culture Tera...[[id number]]                      Final result               Gray Top[[id number]]                                         Final result               Light Blue Top[[id number]]                                   Final result                 Please view results for these tests on the individual orders.   BASIC METABOLIC PANEL   CBC WITH AUTO DIFFERENTIAL   GREEN TOP   LAVENDER TOP   RED TOP   RAINBOW BLOOD CULTURE BOTTLES - 1 SET   GRAY TOP   LIGHT BLUE TOP   CBC AND DIFFERENTIAL    Narrative:     The following orders were created for panel order CBC & Differential.  Procedure                               Abnormality         Status                     ---------                               -----------         ------                     CBC Auto Differential[[id number]]        Abnormal            Final result                 Please view results for these tests on the individual orders.   CBC AND DIFFERENTIAL    Narrative:     The following orders were created for panel order CBC &  Differential.  Procedure                               Abnormality         Status                     ---------                               -----------         ------                     CBC Auto Differential[[id number]]                                                         Please view results for these tests on the individual orders.     CT Abdomen Pelvis With Contrast   Final Result   1. Bilateral nephrolithiasis is present. There is a left-sided   nephrostomy tube in place. There is also a intraureteral component to   the tube with a distal pigtail catheter within the bladder. There is   diffuse thickening of the urothelium within the left renal pelvis and   along the proximal left ureter with adjacent stranding. No significant   dilatation of the upper tracts of the left kidney. This edematous change   and urothelial thickening I feel was present on previous CT of 1 week   earlier. Nonobstructing calculi are also noted within the right kidney   including a larger stone within the right renal pelvis. There is also   diffuse urothelial thickening suggesting pyelitis associated with the   right renal pelvis and proximal right ureter showing progression from   the previous exam. No stent demonstrated on the right. No perinephric   fluid collections are present.   2. A Mojica catheter is in place within the bladder. There is mild   mucosal enhancement within the bladder suggesting there may be   underlying cystitis. The bladder cannot be fully assessed secondary to   its decompression.   3. Moderate constipation with a large volume of stool throughout the   colon.   4. The patient's gastrostomy tube has been removed since the previous   exam. There is a radiodensity associated with the wall of the stomach   near the previous entrance site to the gastrostomy tube..               This report was signed and finalized on 10/8/2023 10:15 PM CDT by Dr. Yuniel De Jesus MD.          XR Chest 1 View   Final Result   1.  No acute disease.       This report was signed and finalized on 10/8/2023 7:11 PM CDT by Dr. Yuniel De Jesus MD.            Medications   sodium chloride 0.9 % flush 10 mL (has no administration in time range)   nitroglycerin (NITROSTAT) SL tablet 0.4 mg (has no administration in time range)   sodium chloride 0.9 % flush 10 mL (10 mL Intravenous Given 10/8/23 2240)   sodium chloride 0.9 % flush 10 mL (has no administration in time range)   sodium chloride 0.9 % infusion 40 mL (has no administration in time range)   mupirocin (BACTROBAN) 2 % nasal ointment 1 application  (1 application  Each Nare Given 10/8/23 2247)   Chlorhexidine Gluconate Cloth 2 % pads 1 application  (has no administration in time range)   sennosides-docusate (PERICOLACE) 8.6-50 MG per tablet 2 tablet (has no administration in time range)     And   polyethylene glycol (MIRALAX) packet 17 g (has no administration in time range)     And   bisacodyl (DULCOLAX) EC tablet 5 mg (has no administration in time range)     And   bisacodyl (DULCOLAX) suppository 10 mg (has no administration in time range)   Enoxaparin Sodium (LOVENOX) syringe 40 mg (has no administration in time range)   Potassium Replacement - Follow Nurse / BPA Driven Protocol (has no administration in time range)   Magnesium Standard Dose Replacement - Follow Nurse / BPA Driven Protocol (has no administration in time range)   Phosphorus Replacement - Follow Nurse / BPA Driven Protocol (has no administration in time range)   Calcium Replacement - Follow Nurse / BPA Driven Protocol (has no administration in time range)   acetaminophen (TYLENOL) tablet 650 mg (has no administration in time range)     Or   acetaminophen (TYLENOL) suppository 650 mg (has no administration in time range)   famotidine (PEPCID) injection 20 mg (20 mg Intravenous Given 10/8/23 2247)   levoFLOXacin (LEVAQUIN) 750 mg/150 mL D5W (premix) (LEVAQUIN) 750 mg (has no administration in time range)   Morphine sulfate  (PF) injection 2 mg (has no administration in time range)   sodium chloride 0.9 % infusion (125 mL/hr Intravenous New Bag 10/8/23 2240)   acetaminophen (TYLENOL) suppository 650 mg (650 mg Rectal Given 10/8/23 1855)   sodium chloride 0.9 % bolus 1,000 mL (0 mL Intravenous Stopped 10/8/23 2030)   sodium chloride 0.9 % bolus 1,000 mL (1,000 mL Intravenous New Bag 10/8/23 2107)   levoFLOXacin (LEVAQUIN) 750 mg/150 mL D5W (premix) (LEVAQUIN) 750 mg (0 mg Intravenous Stopped 10/8/23 2218)   iopamidol (ISOVUE-300) 61 % injection 100 mL (100 mL Intravenous Given 10/8/23 2155)   Chlorhexidine Gluconate Cloth 2 % pads 1 application  (1 application  Topical Given 10/8/23 2240)     Procedures           ED Course  ED Course as of 10/08/23 2331   Sun Oct 08, 2023   2013 I have attempted G-tube insertion at this time, was unsuccessful.  [HE]   2031 I have discussed the patient's case with hospitalist on call, Dr. Hernández who is agreeable to admission. Patient family agreeable as well.  [HE]      ED Course User Index  [HE] Rossy Cheatham APRN                                           Medical Decision Making  Patient is a 48-year-old female who presents to the ED today from [facility] for a displaced G-tube.  Per our nursing staff the nursing home is unsure of the size or type of G-tube that the patient had in place.  Family is present at the bedside and does not have a G-tube here with him.  He is also unaware of the size needed.  Patient is nonverbal, family member and our nursing staff are providing all of the history.  The patient presents tachycardic at 116 bpm with a rectal temp of 100.4.  She does have a chronic Mojica catheter in place, on examination of the back there is a large amount of dark yellow sediment noted.  Unsure when the last catheter change was performed.  The patient is contracted in the bed, this is her baseline.  She is essentially a total care patient.  Lung sounds are clear throughout  bilaterally.  She does have a chronic nephrostomy tube as well.  PMH is significant for JUVENAL, anoxic brain damage, chronic PE, chronic respiratory failure, iron deficiency anemia, MVP, frequent UTIs, and tracheostomy.  Differential diagnoses: Displaced G-tube, urosepsis, pneumonia, COVID, electrolyte imbalance, and other.    Patient was given 2 L IV fluid bolus as well as IV Levaquin and a Tylenol suppository.  Temp improved to 100.4 following Tylenol.    UA is positive for 4+ bacteria, too numerous to count white blood cells and red blood cells, protein, nitrite positive, and large leukocytes.  Urine culture pending.  Was given IV Levaquin.  Cultures pending, lactic normal.  COVID and flu testing negative.  hCG negative.  Procalcitonin mildly elevated at 0.27.  CBC reveals elevated white count at 11, normal H&H and platelet level.  CMP reveals normal creatinine and GFR, BUN/creatinine ratio is very high at 67.  Sodium is elevated at 160 with a chloride of 122.  Patient is severely dehydrated.    CXR reveals no acute findings.     CT abdomen pelvis reveals bilateral nephrolithiasis is present. There is a left-sided  nephrostomy tube in place. There is also a intraureteral component to  the tube with a distal pigtail catheter within the bladder. There is  diffuse thickening of the urothelium within the left renal pelvis and  along the proximal left ureter with adjacent stranding. No significant  dilatation of the upper tracts of the left kidney. This edematous change  and urothelial thickening I feel was present on previous CT of 1 week  earlier. Nonobstructing calculi are also noted within the right kidney  including a larger stone within the right renal pelvis. There is also  diffuse urothelial thickening suggesting pyelitis associated with the  right renal pelvis and proximal right ureter showing progression from  the previous exam. No stent demonstrated on the right. No perinephric  fluid collections are  present.  2. A Mojica catheter is in place within the bladder. There is mild  mucosal enhancement within the bladder suggesting there may be  underlying cystitis. The bladder cannot be fully assessed secondary to  its decompression.  3. Moderate constipation with a large volume of stool throughout the  colon.  4. The patient's gastrostomy tube has been removed since the previous  exam. There is a radiodensity associated with the wall of the stomach  near the previous entrance site to the gastrostomy tube.    I have attempted G-tube insertion at this time, was unsuccessful.     Patient is clearly septic from her urinary tract infection/pyelonephritis.  She is being treated as such.  I have recommended admission.  I discussed the patient's case with hospitalist on-call, Dr. Sena who is agreeable to admission.  I discussed with the hospitalist that I was unable to get the patient's G-tube back in place.    Problems Addressed:  Gastrojejunostomy tube dislodgement: complicated acute illness or injury  Hypernatremia: complicated acute illness or injury  Pyelonephritis: complicated acute illness or injury  Sepsis, due to unspecified organism, unspecified whether acute organ dysfunction present: complicated acute illness or injury    Amount and/or Complexity of Data Reviewed  Labs: ordered.  Radiology: ordered.    Risk  OTC drugs.  Prescription drug management.  Decision regarding hospitalization.        Final diagnoses:   Gastrojejunostomy tube dislodgement   Hypernatremia   Sepsis, due to unspecified organism, unspecified whether acute organ dysfunction present   Pyelonephritis       ED Disposition  ED Disposition       ED Disposition   Decision to Admit    Condition   --    Comment   Level of Care: Critical Care [6]   Diagnosis: Sepsis secondary to UTI [[id number]]   Admitting Physician: DEEPTI SENA [[id number]]   Attending Physician: DEEPTI SENA [[id number]]   Certification: I Certify That Inpatient Hospital  Services Are Medically Necessary For Greater Than 2 Midnights                 No follow-up provider specified.       Medication List      No changes were made to your prescriptions during this visit.            Rossy Cheatham, APRN  10/08/23 3725

## 2023-10-08 NOTE — ED NOTES
Called [facility] to ask for details on patient's G tube. Nurse that cares for patient states she does not know what size or type of tube the patient normally has, and does not know what time it came out. Nurse states she did try to place a 16fr stratton into g tube site to try and keep it open, but states she was unable to do so. Notified BERT Blair of this.

## 2023-10-09 VITALS
TEMPERATURE: 99.1 F | BODY MASS INDEX: 25.78 KG/M2 | SYSTOLIC BLOOD PRESSURE: 97 MMHG | OXYGEN SATURATION: 100 % | RESPIRATION RATE: 20 BRPM | WEIGHT: 131.3 LBS | HEIGHT: 60 IN | HEART RATE: 86 BPM | DIASTOLIC BLOOD PRESSURE: 68 MMHG

## 2023-10-09 PROBLEM — T83.512A: Status: ACTIVE | Noted: 2023-10-09

## 2023-10-09 LAB
ANION GAP SERPL CALCULATED.3IONS-SCNC: 11 MMOL/L (ref 5–15)
BASOPHILS # BLD AUTO: 0.07 10*3/MM3 (ref 0–0.2)
BASOPHILS NFR BLD AUTO: 1 % (ref 0–1.5)
BUN SERPL-MCNC: 46 MG/DL (ref 6–20)
BUN/CREAT SERPL: 61.3 (ref 7–25)
CALCIUM SPEC-SCNC: 8.9 MG/DL (ref 8.6–10.5)
CHLORIDE SERPL-SCNC: 122 MMOL/L (ref 98–107)
CO2 SERPL-SCNC: 23 MMOL/L (ref 22–29)
CREAT SERPL-MCNC: 0.75 MG/DL (ref 0.57–1)
DEPRECATED RDW RBC AUTO: 50 FL (ref 37–54)
EGFRCR SERPLBLD CKD-EPI 2021: 99.6 ML/MIN/1.73
EOSINOPHIL # BLD AUTO: 0.17 10*3/MM3 (ref 0–0.4)
EOSINOPHIL NFR BLD AUTO: 2.4 % (ref 0.3–6.2)
ERYTHROCYTE [DISTWIDTH] IN BLOOD BY AUTOMATED COUNT: 13.8 % (ref 12.3–15.4)
GLUCOSE SERPL-MCNC: 119 MG/DL (ref 65–99)
HCT VFR BLD AUTO: 36.5 % (ref 34–46.6)
HGB BLD-MCNC: 10.9 G/DL (ref 12–15.9)
IMM GRANULOCYTES # BLD AUTO: 0.02 10*3/MM3 (ref 0–0.05)
IMM GRANULOCYTES NFR BLD AUTO: 0.3 % (ref 0–0.5)
LYMPHOCYTES # BLD AUTO: 1.29 10*3/MM3 (ref 0.7–3.1)
LYMPHOCYTES NFR BLD AUTO: 18.2 % (ref 19.6–45.3)
MCH RBC QN AUTO: 29.5 PG (ref 26.6–33)
MCHC RBC AUTO-ENTMCNC: 29.9 G/DL (ref 31.5–35.7)
MCV RBC AUTO: 98.9 FL (ref 79–97)
MONOCYTES # BLD AUTO: 0.59 10*3/MM3 (ref 0.1–0.9)
MONOCYTES NFR BLD AUTO: 8.3 % (ref 5–12)
NEUTROPHILS NFR BLD AUTO: 4.94 10*3/MM3 (ref 1.7–7)
NEUTROPHILS NFR BLD AUTO: 69.8 % (ref 42.7–76)
NRBC BLD AUTO-RTO: 0 /100 WBC (ref 0–0.2)
PLATELET # BLD AUTO: 203 10*3/MM3 (ref 140–450)
PMV BLD AUTO: 11.9 FL (ref 6–12)
POTASSIUM SERPL-SCNC: 3.6 MMOL/L (ref 3.5–5.2)
RBC # BLD AUTO: 3.69 10*6/MM3 (ref 3.77–5.28)
SODIUM SERPL-SCNC: 156 MMOL/L (ref 136–145)
WBC NRBC COR # BLD: 7.08 10*3/MM3 (ref 3.4–10.8)

## 2023-10-09 PROCEDURE — 85025 COMPLETE CBC W/AUTO DIFF WBC: CPT | Performed by: FAMILY MEDICINE

## 2023-10-09 PROCEDURE — 0 POTASSIUM CHLORIDE 10 MEQ/100ML SOLUTION: Performed by: FAMILY MEDICINE

## 2023-10-09 PROCEDURE — 99222 1ST HOSP IP/OBS MODERATE 55: CPT | Performed by: UROLOGY

## 2023-10-09 PROCEDURE — 99222 1ST HOSP IP/OBS MODERATE 55: CPT | Performed by: CLINICAL NURSE SPECIALIST

## 2023-10-09 PROCEDURE — 94799 UNLISTED PULMONARY SVC/PX: CPT

## 2023-10-09 PROCEDURE — 36415 COLL VENOUS BLD VENIPUNCTURE: CPT | Performed by: FAMILY MEDICINE

## 2023-10-09 PROCEDURE — 80048 BASIC METABOLIC PNL TOTAL CA: CPT | Performed by: FAMILY MEDICINE

## 2023-10-09 PROCEDURE — 51703 INSERT BLADDER CATH COMPLEX: CPT | Performed by: UROLOGY

## 2023-10-09 PROCEDURE — 25010000002 ENOXAPARIN PER 10 MG: Performed by: FAMILY MEDICINE

## 2023-10-09 PROCEDURE — 94761 N-INVAS EAR/PLS OXIMETRY MLT: CPT

## 2023-10-09 PROCEDURE — 25010000002 MORPHINE PER 10 MG: Performed by: FAMILY MEDICINE

## 2023-10-09 PROCEDURE — 0 DEXTROSE 5 % SOLUTION: Performed by: FAMILY MEDICINE

## 2023-10-09 RX ORDER — METOPROLOL TARTRATE 50 MG/1
50 TABLET, FILM COATED ORAL 2 TIMES DAILY
COMMUNITY

## 2023-10-09 RX ORDER — HYDROXYZINE PAMOATE 25 MG/1
25 CAPSULE ORAL EVERY 6 HOURS
COMMUNITY

## 2023-10-09 RX ORDER — DOCUSATE SODIUM 50 MG/5ML
200 LIQUID ORAL 2 TIMES DAILY
COMMUNITY

## 2023-10-09 RX ORDER — ROSUVASTATIN CALCIUM 5 MG/1
5 TABLET, COATED ORAL DAILY
COMMUNITY

## 2023-10-09 RX ORDER — ENOXAPARIN SODIUM 100 MG/ML
40 INJECTION SUBCUTANEOUS EVERY MORNING
COMMUNITY

## 2023-10-09 RX ORDER — POTASSIUM CHLORIDE 7.45 MG/ML
10 INJECTION INTRAVENOUS
Status: COMPLETED | OUTPATIENT
Start: 2023-10-09 | End: 2023-10-09

## 2023-10-09 RX ORDER — POLYETHYLENE GLYCOL 3350 17 G/17G
17 POWDER, FOR SOLUTION ORAL EVERY MORNING
COMMUNITY

## 2023-10-09 RX ORDER — POTASSIUM CHLORIDE 750 MG/1
20 CAPSULE, EXTENDED RELEASE ORAL EVERY MORNING
COMMUNITY

## 2023-10-09 RX ORDER — DEXTROSE MONOHYDRATE 50 MG/ML
100 INJECTION, SOLUTION INTRAVENOUS CONTINUOUS
Status: DISCONTINUED | OUTPATIENT
Start: 2023-10-09 | End: 2023-10-09 | Stop reason: HOSPADM

## 2023-10-09 RX ORDER — BACLOFEN 20 MG/1
20 TABLET ORAL EVERY 6 HOURS
COMMUNITY

## 2023-10-09 RX ORDER — MAGNESIUM HYDROXIDE 1200 MG/15ML
10 LIQUID ORAL EVERY 8 HOURS
COMMUNITY

## 2023-10-09 RX ORDER — BISACODYL 10 MG
10 SUPPOSITORY, RECTAL RECTAL
COMMUNITY

## 2023-10-09 RX ORDER — FERROUS SULFATE 220 (44)/5
5 SOLUTION, ORAL ORAL EVERY MORNING
COMMUNITY

## 2023-10-09 RX ORDER — ACETAMINOPHEN 500 MG
500 TABLET ORAL EVERY 4 HOURS PRN
COMMUNITY

## 2023-10-09 RX ORDER — ONDANSETRON HYDROCHLORIDE 4 MG/5ML
4 SOLUTION ORAL EVERY 6 HOURS PRN
COMMUNITY

## 2023-10-09 RX ORDER — TIZANIDINE 4 MG/1
8 TABLET ORAL EVERY 8 HOURS
COMMUNITY

## 2023-10-09 RX ORDER — ENEMA 19; 7 G/133ML; G/133ML
1 ENEMA RECTAL
COMMUNITY

## 2023-10-09 RX ORDER — SODIUM CHLORIDE FOR INHALATION 3 %
4 VIAL, NEBULIZER (ML) INHALATION EVERY 6 HOURS PRN
COMMUNITY

## 2023-10-09 RX ORDER — SACCHAROMYCES BOULARDII 250 MG
250 CAPSULE ORAL EVERY MORNING
COMMUNITY

## 2023-10-09 RX ORDER — CHLORHEXIDINE GLUCONATE ORAL RINSE 1.2 MG/ML
15 SOLUTION DENTAL 2 TIMES DAILY
COMMUNITY

## 2023-10-09 RX ORDER — ALBUTEROL SULFATE 2.5 MG/3ML
2.5 SOLUTION RESPIRATORY (INHALATION) EVERY 4 HOURS PRN
COMMUNITY

## 2023-10-09 RX ORDER — CARBOXYMETHYLCELLULOSE SODIUM 5 MG/ML
1 SOLUTION/ DROPS OPHTHALMIC 2 TIMES DAILY
COMMUNITY

## 2023-10-09 RX ORDER — SIMETHICONE 20 MG/.3ML
20 EMULSION ORAL EVERY 6 HOURS
COMMUNITY

## 2023-10-09 RX ORDER — OXYCODONE HYDROCHLORIDE 5 MG/1
5 TABLET ORAL EVERY 6 HOURS PRN
COMMUNITY

## 2023-10-09 RX ADMIN — Medication 10 ML: at 09:38

## 2023-10-09 RX ADMIN — POTASSIUM CHLORIDE 10 MEQ: 7.46 INJECTION, SOLUTION INTRAVENOUS at 11:05

## 2023-10-09 RX ADMIN — Medication 1 APPLICATION: at 09:37

## 2023-10-09 RX ADMIN — POTASSIUM CHLORIDE 10 MEQ: 7.46 INJECTION, SOLUTION INTRAVENOUS at 09:37

## 2023-10-09 RX ADMIN — FAMOTIDINE 20 MG: 10 INJECTION INTRAVENOUS at 09:37

## 2023-10-09 RX ADMIN — ENOXAPARIN SODIUM 40 MG: 100 INJECTION SUBCUTANEOUS at 09:37

## 2023-10-09 RX ADMIN — POTASSIUM CHLORIDE 10 MEQ: 7.46 INJECTION, SOLUTION INTRAVENOUS at 08:23

## 2023-10-09 RX ADMIN — ACETAMINOPHEN 650 MG: 650 SUPPOSITORY RECTAL at 05:33

## 2023-10-09 RX ADMIN — CHLORHEXIDINE GLUCONATE 1 APPLICATION: 500 CLOTH TOPICAL at 03:15

## 2023-10-09 RX ADMIN — MORPHINE SULFATE 2 MG: 2 INJECTION, SOLUTION INTRAMUSCULAR; INTRAVENOUS at 16:19

## 2023-10-09 RX ADMIN — DEXTROSE MONOHYDRATE 100 ML/HR: 50 INJECTION, SOLUTION INTRAVENOUS at 12:37

## 2023-10-09 RX ADMIN — MORPHINE SULFATE 2 MG: 2 INJECTION, SOLUTION INTRAMUSCULAR; INTRAVENOUS at 04:08

## 2023-10-09 RX ADMIN — ACETAMINOPHEN 650 MG: 650 SUPPOSITORY RECTAL at 09:37

## 2023-10-09 RX ADMIN — POTASSIUM CHLORIDE 10 MEQ: 7.46 INJECTION, SOLUTION INTRAVENOUS at 06:43

## 2023-10-09 RX ADMIN — MORPHINE SULFATE 2 MG: 2 INJECTION, SOLUTION INTRAMUSCULAR; INTRAVENOUS at 09:37

## 2023-10-09 NOTE — PROGRESS NOTES
[facility] Medicine Services  INPATIENT PROGRESS NOTE    Patient Name: Namita Zabala  Date of Admission: 10/8/2023  Today's Date: 10/09/23  Length of Stay: 1  Primary Care Physician: David Lara MD    Subjective   Chief Complaint: Fever, sepsis  HPI     The patient remains febrile with maximum temperature of 101.6 over the past 24 hours.  She appears to be acutely ill and meets criteria for sepsis.  At admission her PEG tube was noted to be dislodged and nonfunctional.  Mojica was out and could not be replaced in the ER.  Urology was consulted and Mojica has been successfully placed.  Case discussed extensively with urology.  Gastroenterology is not available this week and general surgery has been consulted for replacement of PEG tube.  The patient has a long and complex medical history.  Until the past month, the patient was a resident in a long-term care facility near [place], [place] where she was treated for a prolonged period of time at .  The patient was apparently transferred to [facility] for continued rehabilitation and treatment.  There is no record of a plan for replacement of nephroureteral tube as recommended.  She was admitted to our facility with a complicated UTI.  Urinary output is managed with a Mojica catheter and left nephroureteral tube.  Her last tube exchange was in July, 2023.  Nephroureteral tube was supposed to be changed every 90 days and we are not aware of a scheduled change.  Our facility does not have the capability for nephroureteral tube exchange.  If the patient's family wishes to continue aggressive treatment, the patient will require transfer to .  Palliative care has been consulted to assist with discussing further treatment desires that the family may have.  If they wish to continue aggressive treatment, the patient would be better served in a long-term care facility that is closer to  where advanced treatments can be  rendered.  White blood cell count within normal limits.  Hemoglobin 10.9.  BMP significant for elevated sodium of 156 and chloride 122.    Attempted to contact the patient's .  Message left on voicemail.  Case discussed with palliative care.    Review of Systems   All pertinent negatives and positives are as above. All other systems have been reviewed and are negative unless otherwise stated.     Objective    Temp:  [97.5 øF (36.4 øC)-101.6 øF (38.7 øC)] 100.2 øF (37.9 øC)  Heart Rate:  [] 126  Resp:  [13-20] 20  BP: ()/(57-98) 106/75  Physical Exam  Constitutional:       Appearance: She is well-developed. She is ill-appearing.   HENT:      Head: Normocephalic and atraumatic.      Right Ear: External ear normal.      Left Ear: External ear normal.      Mouth: Mucous membranes are dry.   Eyes:      General: Conjunctivae clear bilaterally.  No scleral icterus.  Neck:      Comments: Neck contraction.  Cardiovascular:      Rate and Rhythm: Regular rhythm. Tachycardia present.      Heart sounds: No murmur heard.  Pulmonary:      Effort: No respiratory distress.      Breath sounds: No stridor. No wheezing, rhonchi or rales.      Comments: Tracheostomy in place  Abdominal:      General: Bowel sounds are normal. There is no distension.      Palpations: Abdomen is soft.      Tenderness: There is no abdominal tenderness. There is no guarding or rebound.   Musculoskeletal:         General: No swelling.  Contracture deformities of fingers, upper extremities and lower extremities.     Right lower leg: No edema.      Left lower leg: No edema.   Skin:     General: Skin is dry.      Coloration: Skin is pale.      Findings: No erythema or rash.   Neurological:      Mental Status: Mental status is at baseline.      Motor: No abnormal muscle tone.     Results Review:  I have reviewed the labs, radiology results, and diagnostic studies.    Laboratory Data:   Results from last 7 days   Lab Units 10/09/23  4659  "10/08/23  1902   WBC 10*3/mm3 7.08 11.18*   HEMOGLOBIN g/dL 10.9* 13.3   HEMATOCRIT % 36.5 42.8   PLATELETS 10*3/mm3 203 245        Results from last 7 days   Lab Units 10/09/23  0333 10/08/23  1902   SODIUM mmol/L 156* 160*   POTASSIUM mmol/L 3.6 3.8   CHLORIDE mmol/L 122* 122*   CO2 mmol/L 23.0 27.0   BUN mg/dL 46* 63*   CREATININE mg/dL 0.75 0.94   CALCIUM mg/dL 8.9 10.1   BILIRUBIN mg/dL  --  0.8   ALK PHOS U/L  --  67   ALT (SGPT) U/L  --  20   AST (SGOT) U/L  --  14   GLUCOSE mg/dL 119* 131*       Culture Data:   No results found for: \"BLOODCX\", \"URINECX\", \"WOUNDCX\", \"MRSACX\", \"RESPCX\", \"STOOLCX\"    Radiology Data:   Imaging Results (Last 24 Hours)       Procedure Component Value Units Date/Time    CT Abdomen Pelvis With Contrast [[id number]] Collected: 10/08/23 2202     Updated: 10/08/23 2219    Narrative:      CT ABDOMEN PELVIS W CONTRAST- 10/8/2023 9:43 PM CDT     HISTORY: Acute UTI, right nephrostomy tube, septic;  L02-Umzodv-ciptkdfcfvcn nephritis, not specified as acute or chronic;  T85.528A-Displacement of other gastrointestinal prosthetic devices,  implants and grafts, initial encounter; E87.0-Hyperosmolality and  hypernatremia; A41.9-Sepsis, unspecified organism       COMPARISON: 10/1/2023.     DLP: 930 mGy cm. All CT scans are performed using dose optimization  techniques as appropriate to the performed exam and including at least  one of the following: Automated exposure control, adjustment of the mA  and/or kV according to size, and the use of the iterative reconstruction  technique.     TECHNIQUE: Following the intravenous administration of contrast, helical  CT tomographic images of the abdomen and pelvis were acquired. Coronal  reformatted images were also provided for review.     FINDINGS:  Bibasilar atelectasis is present. The base of the heart is  unremarkable..     LIVER: No focal liver lesion. The hepatic vasculature is patent.     BILIARY SYSTEM: The gallbladder is unremarkable. No " intrahepatic or  extrahepatic ductal dilatation.     PANCREAS: No focal pancreatic lesion.     SPLEEN: Unremarkable.     KIDNEYS AND ADRENALS: Again demonstrated is an 11 mm calculus within the  right renal pelvis as well as smaller calculi in the posterior upper  pole calyx of the right kidney. No perinephric fluid collection is  present on the right. There is mild dilatation of the upper tracts of  the right kidney with associated stranding associated with the right  renal pelvis and proximal right ureter as well as urothelial thickening  suggesting pyelitis. This inflammation shows some progression from the  previous exam of 1 week earlier.     There is a 4 mm nonobstructing calculus in the upper pole calyx of the  left kidney. A left-sided nephrostomy tube is in place and  well-positioned. There is diffuse urothelial thickening and stranding  associated with the left renal pelvis and proximal left ureter  suggesting pyelitis. This is similar to the previous CT 1 week earlier.  A stent is in place within the left ureter with a distal pigtail noted  within the decompressed urinary bladder. The distal right ureter is  decompressed and normal in appearance..     RETROPERITONEUM: No mass, lymphadenopathy or hemorrhage.     GI TRACT: There is moderate constipation with a large volume of stool  within the colon.. No evidence of acute appendicitis.. Postoperative  changes are noted within the stomach. Previously noted gastrostomy tube  has been removed.     OTHER: There is no mesenteric mass, lymphadenopathy or fluid collection.  The abdominopelvic vasculature is patent. No acute or suspicious bony  abnormalities are present.. No free fluid in the abdomen or pelvis.     PELVIS: No free fluid in the pelvis.. A Mojica catheter is in place  within the bladder with the bladder decompressed. The bladder cannot be  fully assessed. There does appear to be some mucosal enhancement  suggesting there may be cystitis. There is air  within the bladder  presumably related to the catheterization..       Impression:      1. Bilateral nephrolithiasis is present. There is a left-sided  nephrostomy tube in place. There is also a intraureteral component to  the tube with a distal pigtail catheter within the bladder. There is  diffuse thickening of the urothelium within the left renal pelvis and  along the proximal left ureter with adjacent stranding. No significant  dilatation of the upper tracts of the left kidney. This edematous change  and urothelial thickening I feel was present on previous CT of 1 week  earlier. Nonobstructing calculi are also noted within the right kidney  including a larger stone within the right renal pelvis. There is also  diffuse urothelial thickening suggesting pyelitis associated with the  right renal pelvis and proximal right ureter showing progression from  the previous exam. No stent demonstrated on the right. No perinephric  fluid collections are present.  2. A Mojica catheter is in place within the bladder. There is mild  mucosal enhancement within the bladder suggesting there may be  underlying cystitis. The bladder cannot be fully assessed secondary to  its decompression.  3. Moderate constipation with a large volume of stool throughout the  colon.  4. The patient's gastrostomy tube has been removed since the previous  exam. There is a radiodensity associated with the wall of the stomach  near the previous entrance site to the gastrostomy tube..           This report was signed and finalized on 10/8/2023 10:15 PM CDT by Dr. Yuniel De Jesus MD.       XR Chest 1 View [[id number]] Collected: 10/08/23 1910     Updated: 10/08/23 1914    Narrative:      EXAMINATION: XR CHEST 1 VW-  10/8/2023 7:10 PM CDT     HISTORY: Fever in nursing home patient.     FINDINGS: Today's exam is compared to previous study of 10/1/2023.  Upright frontal projection of the chest reveals a tracheostomy tube in  place. Lungs are clear. No  consolidative pneumonia or effusion.       Impression:      1. No acute disease.     This report was signed and finalized on 10/8/2023 7:11 PM CDT by Dr. Yuniel De Jesus MD.               I have reviewed the patient's current medications.     Assessment/Plan   Assessment  Active Hospital Problems    Diagnosis     **Sepsis secondary to UTI     Anoxic brain injury     Functional quadriplegia     Tracheostomy present     PEG tube malfunction     Dysphagia     Essential (primary) hypertension        Treatment Plan  Discontinue normal saline IV  D5W at 100 cc/h  Palliative care consultation to determine scope of care.  If aggressive care is desired, the patient will need to be transferred to  as we do not have the capability to care for her here.  General surgery consultation for PEG tube replacement is pending.  Case discussed with urology    Medical Decision Making  Number and Complexity of problems:   1) sepsis due to UTI, acute, high complexity, life-threatening  2) UTI secondary to nephroureteral tube, acute, high complexity, life-threatening  3) tracheostomy dependence, chronic, moderate complexity  4) PEG tube displacement, acute, moderate complexity  5) anoxic brain injury with functional quadriplegia and diffuse contractures, chronic, high complexity    Differential Diagnosis: None others considered    Conditions and Status        Condition is unchanged.     [facility] Data  External documents reviewed: Care Everywhere documentation  Cardiac tracing (EKG, telemetry) interpretation: See HPI  Radiology interpretation: See HPI  Labs reviewed: See HPI  Any tests that were considered but not ordered: None     Decision rules/scores evaluated (example [id number], Wells, etc): None     Discussed with: Nursing staff, urology and palliative care     Care Planning  Shared decision making: The patient's family, palliative care  Code status and discussions: No CPR, limited interventions    Disposition  Social Determinants of  Health that impact treatment or disposition: None noted  I expect the patient to be discharged to SNF or tertiary care center in ? days.     Electronically signed by Mki Meeks DO, 10/09/23, 11:54 CDT.

## 2023-10-09 NOTE — NURSING NOTE
Report called to [facility] to CARLEY Pitts at this time. Pt going to room 312.

## 2023-10-09 NOTE — CONSULTS
Referring Provider: Florentino  Reason for Consultation: Complicated UTI and complex urologic history    Chief complaint: Complicated UTI and difficult Mojica placement    Subjective     History of present illness:    Mrs. Zabala is 1 48 year old female with a complicated urinary history.    Consults by Junior Dyer MD (04/05/2022 07:28)     She presents to our ER with febrile illness, likely complicated UTI. I spoke with her , Grant, who reports she has recently relocated to a facility here from [place] approximately 1 month ago. Her urinary tract is currently managed with Mojica and left nephroureteral tube. Per Care Everywhere, it appears her left tube was last exchanged in July 2023. She has been started on broad spectrum antibiotics. The ER staff removed her Mojica but were unable to replace it given the severe body habitus contractions.     Review of Systems  Pertinent items are noted in HPI     History  Past Medical History:   Diagnosis Date    Acute kidney failure, unspecified     Angina at rest     Anoxic brain damage, not elsewhere classified     Chronic pulmonary embolism     Chronic respiratory failure, unspecified whether with hypoxia or hypercapnia     Dependence on respirator (ventilator) status     Gastrointestinal hemorrhage, unspecified     Hypercalcemia     Iron deficiency anemia     Metabolic encephalopathy     Migraine     Sees Pain Management for injections.     MVP (mitral valve prolapse)     Other dysphagia     Other pulmonary embolism with acute cor pulmonale     Renal disorder     Syncope     Urinary tract infection, site not specified        Past Surgical History:   Procedure Laterality Date    BREAST BIOPSY Right 2011    benign    INSERTION HEMODIALYSIS CATHETER Left 9/16/2021    Procedure: HEMODIALYSIS CATHETER INSERTION;  Surgeon: Anders Kaur MD;  Location: [place] [address];  Service: Vascular;  Laterality: Left;    TONSILLECTOMY         Family History    Problem Relation Age of Onset    Colon cancer Maternal Aunt 52    Fibromyalgia Mother     Heart disease Mother     Hypertension Mother     Hodgkin's lymphoma Paternal Grandmother     Ovarian cancer Neg Hx     Breast cancer Neg Hx        Social History     Socioeconomic History    Marital status:    Tobacco Use    Smoking status: Never    Smokeless tobacco: Never   Vaping Use    Vaping Use: Never used   Substance and Sexual Activity    Alcohol use: No    Drug use: No       Current Facility-Administered Medications   Medication Dose Route Frequency Provider Last Rate Last Admin    acetaminophen (TYLENOL) tablet 650 mg  650 mg Oral Q4H PRN David Hernández MD        Or    acetaminophen (TYLENOL) suppository 650 mg  650 mg Rectal Q4H PRN David Hernández MD   650 mg at 10/09/23 0533    sennosides-docusate (PERICOLACE) 8.6-50 MG per tablet 2 tablet  2 tablet Oral BID David Hernández MD        And    polyethylene glycol (MIRALAX) packet 17 g  17 g Oral Daily PRN David Hernández MD        And    bisacodyl (DULCOLAX) EC tablet 5 mg  5 mg Oral Daily PRN David Hernández MD        And    bisacodyl (DULCOLAX) suppository 10 mg  10 mg Rectal Daily PRN David Hernández MD        Calcium Replacement - Follow Nurse / BPA Driven Protocol   Does not apply PRN David Hernández MD        Chlorhexidine Gluconate Cloth 2 % pads 1 application   1 application  Topical Q24H David Hernández MD   1 application  at 10/09/23 0315    Enoxaparin Sodium (LOVENOX) syringe 40 mg  40 mg Subcutaneous Daily David Hernández MD        famotidine (PEPCID) injection 20 mg  20 mg Intravenous Q12H David Hernández MD   20 mg at 10/08/23 5817    levoFLOXacin (LEVAQUIN) 750 mg/150 mL D5W (premix) (LEVAQUIN) 750 mg  750 mg Intravenous Q24H David Hernández MD        Magnesium Standard Dose Replacement - Follow Nurse / BPA Driven Protocol   Does not apply PRN  David Hernández MD        Morphine sulfate (PF) injection 2 mg  2 mg Intravenous Q2H PRN David Hernández MD   2 mg at 10/09/23 0408    mupirocin (BACTROBAN) 2 % nasal ointment 1 application   1 application  Each Nare BID David Hernández MD   1 application  at 10/08/23 2247    nitroglycerin (NITROSTAT) SL tablet 0.4 mg  0.4 mg Sublingual Q5 Min PRN David Hernández MD        Phosphorus Replacement - Follow Nurse / BPA Driven Protocol   Does not apply PRN David Hernández MD        potassium chloride 10 mEq in 100 mL IVPB  10 mEq Intravenous Q1H David Hernández  mL/hr at 10/09/23 0823 10 mEq at 10/09/23 0823    Potassium Replacement - Follow Nurse / BPA Driven Protocol   Does not apply PRN David Hernández MD        sodium chloride 0.9 % flush 10 mL  10 mL Intravenous PRN Rossy Cheatham APRN        sodium chloride 0.9 % flush 10 mL  10 mL Intravenous Q12H David Hernández MD   10 mL at 10/08/23 2240    sodium chloride 0.9 % flush 10 mL  10 mL Intravenous PRN David Hernández MD        sodium chloride 0.9 % infusion 40 mL  40 mL Intravenous PRN David Hernández MD        sodium chloride 0.9 % infusion  125 mL/hr Intravenous Continuous David Hernández  mL/hr at 10/08/23 2240 125 mL/hr at 10/08/23 2240        Allergies   Allergen Reactions    Coconut Unknown - High Severity    Nuts Unknown - High Severity    Penicillins     [place] Other (See Comments)     Causes migraines per pt reports       Objective     Vital Signs   Temp:  [97.5 øF (36.4 øC)-101.4 øF (38.6 øC)] 100.3 øF (37.9 øC)  Heart Rate:  [] 128  Resp:  [13-20] 20  BP: ()/(57-89) 129/79    Intake/Output Summary (Last 24 hours) at 10/9/2023 0855  Last data filed at 10/9/2023 0823  Gross per 24 hour   Intake 1100 ml   Output 225 ml   Net 875 ml       Physical Exam:    General: Non-communicative, nontoxic, comfortable, contracted body  habitus  Head:  Normocephalic, without obvious abnormality, atraumatic  Eyes:   Lids and lashes normal, no icterus, no pallor  Ears:  Ears appear intact with no abnormalities noted  Neck:  Supple  Back:  No costovertebral angle tenderness  Lungs: Unlabored respirations  Heart:  Regular rhythm and normal rate  Abdomen: Soft, nontender, nondistended, Abdominal scarring noted.  :  Under routine sterile conditions, Mojica catheter placed. Difficult to assess perineum with severity of contractions. Clear urine return. 10cc sterile water placed in balloon and Mojica pulled into place.  Extremities: Severe contraction of extremities  Skin:  Warm and dry  Neurologic: Cranial nerves 2 - 12 grossly intact    Data Review:    H&P by David Hernández MD (10/08/2023 20:28)    Urinalysis With Culture If Indicated - Indwelling Urethral Catheter (10/08/2023 18:47)    Urinalysis, Microscopic Only - Indwelling Urethral Catheter (10/08/2023 18:47)    CBC & Differential (10/09/2023 03:33)    Basic Metabolic Panel (10/09/2023 03:33)    Assessment and Plan:    Complicated UTI: Monitor cultures. Continue broad-spectrum antibiotics pending culture results. Mojica exchanged. Likely needs nephroureteral tube replaced. We do not have interventional radiology here. Per discussion with her , Grant, it is unclear when her nephrostomy tube was last placed. He believes this tube is exchanged every 90 days and that the last few changes have been at  as she was staying a a facility near [place] until approximately 1 month ago. Per his report, the  Urology staff considered removing the nephroureteral tube but they did not think removing it would prove successful. She did receive care from [facility]  for some time until she was admitted to the facility near [place]. I would recommend transfer to a tertiary facility (, [facility]) for nephroureteral tube replacement as it appears her last exchange was in  July.    UROLOGICAL DISPOSITION: Follow up with tertiary Urology. Most recently that was [facility] but she has also been treated at [facility].    I discussed the patient's findings and my recommendations with patient, family, and nursing staff    (Please note that portions of this note were completed with a voice recognition program.)    Junior Dyer MD  10/09/23  08:55 CDT    Time: Time spent: 45 minutes spent performing evaluation and management, chart review, and discussion with patient, > 50% of time spent in face-to-face encounter

## 2023-10-09 NOTE — H&P
[facility] Medicine Services  HISTORY AND PHYSICAL    Date of Admission: 10/8/2023  Primary Care Physician: David Lara MD    Subjective   Primary Historian: Patient's     Chief Complaint: Fever    History of Present Illness  48 year old female with PMH of anoxic brain injury, tracheostomy, PEG tube, CRF, that presents to the ER with fevers. She is non verbal and currently does only grimace. Presents with temp of 101.4F, /71, , )2 saturation 100% on concentrator. WBC is 11, Sodium 160, BUN 63, creatinine 0.94. Urine murky and malodorous with UA consistent with UTI. Her blood pressure after 2 L NS bolus cotinued to trend down and is now 90/56.     Her PEG tube was non functional and dislodged, a replacement with Mojica could not be secured in the ER. She will need replacement.     Review of Systems   Otherwise complete ROS reviewed and negative except as mentioned in the HPI.    Past Medical History:   Past Medical History:   Diagnosis Date    Acute kidney failure, unspecified     Angina at rest     Anoxic brain damage, not elsewhere classified     Chronic pulmonary embolism     Chronic respiratory failure, unspecified whether with hypoxia or hypercapnia     Dependence on respirator (ventilator) status     Gastrointestinal hemorrhage, unspecified     Hypercalcemia     Iron deficiency anemia     Metabolic encephalopathy     Migraine     Sees Pain Management for injections.     MVP (mitral valve prolapse)     Other dysphagia     Other pulmonary embolism with acute cor pulmonale     Renal disorder     Syncope     Urinary tract infection, site not specified      Past Surgical History:  Past Surgical History:   Procedure Laterality Date    BREAST BIOPSY Right 2011    benign    INSERTION HEMODIALYSIS CATHETER Left 9/16/2021    Procedure: HEMODIALYSIS CATHETER INSERTION;  Surgeon: Anders Kaur MD;  Location: [place] [address];  Service: Vascular;   Laterality: Left;    TONSILLECTOMY       Social History:  reports that she has never smoked. She has never used smokeless tobacco. She reports that she does not drink alcohol and does not use drugs.    Family History: family history includes Colon cancer (age of onset: 52) in her maternal aunt; Fibromyalgia in her mother; Heart disease in her mother; Hodgkin's lymphoma in her paternal grandmother; Hypertension in her mother.       Allergies:  Allergies   Allergen Reactions    Coconut Unknown - High Severity    Nuts Unknown - High Severity    Penicillins     [place] Other (See Comments)     Causes migraines per pt reports       Medications:  Prior to Admission medications    Medication Sig Start Date End Date Taking? Authorizing Provider   butalbital-acetaminophen-caffeine (FIORICET, ESGIC) -40 MG per tablet Take 2 tablets by mouth Every 4 (Four) Hours As Needed for Headache or Migraine.    Odalys Mullen MD   Calcium Carbonate-Simethicone 750-80 MG chewable tablet Chew 1 tablet Daily.    Odalys Mullen MD   eszopiclone (LUNESTA) 3 MG tablet Take 3 mg by mouth Every Night. Take immediately before bedtime    Odalys Mullen MD   FLUoxetine (PROzac) 40 MG capsule Take 40 mg by mouth Daily.    Odalys Mullen MD   lactated ringers infusion Infuse 75 mL/hr into a venous catheter Continuous. 4/21/22   Brody Potter MD   levETIRAcetam in NaCl 0.82% (KEPPRA) 500 MG/100ML solution IVPB Infuse 100 mL into a venous catheter Every 12 (Twelve) Hours. 4/21/22   Brody Potter MD   methocarbamol (ROBAXIN) 500 MG tablet Take 500 mg by mouth 3 (Three) Times a Day As Needed for Muscle Spasms.    Odalys Mullen MD   metoclopramide (REGLAN) 5 MG/5ML solution Take 5 mg by mouth 3 (Three) Times a Day Before Meals.    Odalys Mullen MD   Norepinephrine-Sodium Chloride (LEVOPHED) 8-0.9 MG/250ML-% solution infusion Infuse 1.442-21.63 mcg/min into a venous catheter Dose Adjusted  "By Provider As Needed. 4/21/22   Brody Potter MD   ondansetron ODT (ZOFRAN-ODT) 4 MG disintegrating tablet Place 4 mg on the tongue 4 (Four) Times a Day As Needed for Nausea or Vomiting.    Odalys Mullen MD   oxymetazoline (AFRIN) 0.05 % nasal spray 2 sprays into the nostril(s) as directed by provider 2 (Two) Times a Day.    Odalys Mullen MD   pantoprazole (PROTONIX) 20 MG EC tablet Take 20 mg by mouth Daily.    Odalys Mullen MD   prochlorperazine (COMPAZINE) 10 MG tablet Take 10 mg by mouth Every 8 (Eight) Hours As Needed for Nausea or Vomiting.    Odalys Mullen MD   promethazine (PHENERGAN) 25 MG tablet Take 25-50 mg by mouth Every 6 (Six) Hours As Needed for Nausea or Vomiting.    Odalys Mullen MD   propofol (DIPRIVAN) 10 mg/mL emulsion infusion Infuse 360.5-3,605 mcg/min into a venous catheter Dose Adjusted By Provider As Needed. 4/21/22   Brody Potter MD   rizatriptan (MAXALT) 10 MG tablet Take 10 mg by mouth 1 (One) Time As Needed for Migraine. May repeat in 2 hours if needed    Odalys Mullen MD   sodium bicarbonate 650 MG tablet Take 1 tablet by mouth 2 (Two) Times a Day. 4/21/22   Brody Potter MD   traZODone (DESYREL) 100 MG tablet Take 100 mg by mouth Every Night.    Odalys Mullen MD     I have utilized all available immediate resources to obtain, update, or review the patient's current medications (including all prescriptions, over-the-counter products, herbals, cannabis/cannabidiol products, and vitamin/mineral/dietary (nutritional) supplements).    Objective     Vital Signs: /71   Pulse 104   Temp 100.4 øF (38 øC) (Rectal)   Resp 20   Ht 152.4 cm (60\")   Wt 64.4 kg (142 lb)   LMP  (LMP Unknown)   SpO2 100%   BMI 27.73 kg/mý   Physical Exam  Vitals reviewed.   Constitutional:       Appearance: She is well-developed. She is ill-appearing, toxic-appearing and diaphoretic.   HENT:      Head: Normocephalic and " atraumatic.      Right Ear: External ear normal.      Left Ear: External ear normal.      Mouth/Throat:      Mouth: Mucous membranes are dry.   Eyes:      General:         Right eye: No discharge.         Left eye: No discharge.   Neck:      Vascular: No carotid bruit or JVD.      Comments: Contracted neck and extremities.  Cardiovascular:      Rate and Rhythm: Regular rhythm. Tachycardia present.      Heart sounds: No murmur heard.  Pulmonary:      Effort: No respiratory distress.      Breath sounds: No stridor. No wheezing, rhonchi or rales.      Comments: Tracheostomy in place  Chest:      Chest wall: No tenderness.   Abdominal:      General: Bowel sounds are normal. There is no distension.      Palpations: Abdomen is soft.      Tenderness: There is no abdominal tenderness. There is no guarding or rebound.      Hernia: No hernia is present.   Musculoskeletal:         General: No swelling.      Cervical back: No muscular tenderness.      Right lower leg: No edema.      Left lower leg: No edema.   Lymphadenopathy:      Cervical: No cervical adenopathy.   Skin:     General: Skin is dry.      Coloration: Skin is pale.      Findings: No erythema or rash.   Neurological:      Mental Status: Mental status is at baseline.      Motor: No abnormal muscle tone.     Results Reviewed:  Lab Results (last 24 hours)       Procedure Component Value Units Date/Time    COVID PRE-OP / PRE-PROCEDURE SCREENING ORDER (NO ISOLATION) - Swab, Nasopharynx [[id number]]  (Normal) Collected: 10/08/23 1845    Specimen: Swab from Nasopharynx Updated: 10/08/23 1936    Narrative:      The following orders were created for panel order COVID PRE-OP / PRE-PROCEDURE SCREENING ORDER (NO ISOLATION) - Swab, Nasopharynx.  Procedure                               Abnormality         Status                     ---------                               -----------         ------                     COVID-19 and FLU A/B PCR...[[id number]]  Normal               Final result                 Please view results for these tests on the individual orders.    COVID-19 and FLU A/B PCR - Swab, Nasopharynx [[id number]]  (Normal) Collected: 10/08/23 1845    Specimen: Swab from Nasopharynx Updated: 10/08/23 1936     COVID19 Not Detected     Influenza A PCR Not Detected     Influenza B PCR Not Detected    Narrative:      Fact sheet for providers: https://www.fda.gov/media/398191/download    Fact sheet for patients: https://www.fda.gov/media/112081/download    Test performed by PCR.  Fact sheet for providers: https://www.fda.gov/media/309859/download    Fact sheet for patients: https://www.fda.gov/media/801905/download    Test performed by PCR.    Comprehensive Metabolic Panel [[id number]]  (Abnormal) Collected: 10/08/23 1902    Specimen: Blood Updated: 10/08/23 1935     Glucose 131 mg/dL      BUN 63 mg/dL      Creatinine 0.94 mg/dL      Sodium 160 mmol/L      Potassium 3.8 mmol/L      Chloride 122 mmol/L      CO2 27.0 mmol/L      Calcium 10.1 mg/dL      Total Protein 7.5 g/dL      Albumin 4.1 g/dL      ALT (SGPT) 20 U/L      AST (SGOT) 14 U/L      Alkaline Phosphatase 67 U/L      Total Bilirubin 0.8 mg/dL      Globulin 3.4 gm/dL      A/G Ratio 1.2 g/dL      BUN/Creatinine Ratio 67.0     Anion Gap 11.0 mmol/L      eGFR 75.9 mL/min/1.73     Narrative:      GFR Normal >60  Chronic Kidney Disease <60  Kidney Failure <15      Urinalysis With Culture If Indicated - Indwelling Urethral Catheter [[id number]]  (Abnormal) Collected: 10/08/23 1847    Specimen: Urine from Indwelling Urethral Catheter Updated: 10/08/23 1921     Color, UA [place]     Appearance, UA Turbid     pH, UA 8.5     Specific Gravity, UA 1.023     Glucose, UA Negative     Ketones, UA Negative     Bilirubin, UA Negative     Blood, UA Large (3+)     Protein, UA >=300 mg/dL (3+)     Leuk Esterase, UA Large (3+)     Nitrite, UA Positive     Urobilinogen, UA 1.0 E.U./dL    Narrative:      Dipstick results may be inaccurate due to  color interference.    Urinalysis, Microscopic Only - Indwelling Urethral Catheter [[id number]]  (Abnormal) Collected: 10/08/23 1847    Specimen: Urine from Indwelling Urethral Catheter Updated: 10/08/23 1921     RBC, UA Too Numerous to Count /HPF      WBC, UA Too Numerous to Count /HPF      Bacteria, UA 4+ /HPF      Squamous Epithelial Cells, UA 0-2 /HPF      Hyaline Casts, UA 0-2 /LPF      Triple Phosphate Crystals, UA Small/1+ /HPF      Mucus, UA Small/1+ /HPF      Methodology Automated Microscopy    Urine Culture - Urine, Indwelling Urethral Catheter [[id number]] Collected: 10/08/23 1847    Specimen: Urine from Indwelling Urethral Catheter Updated: 10/08/23 1916    CBC & Differential [[id number]]  (Abnormal) Collected: 10/08/23 1902    Specimen: Blood Updated: 10/08/23 1914    Narrative:      The following orders were created for panel order CBC & Differential.  Procedure                               Abnormality         Status                     ---------                               -----------         ------                     CBC Auto Differential[[id number]]        Abnormal            Final result                 Please view results for these tests on the individual orders.    CBC Auto Differential [[id number]]  (Abnormal) Collected: 10/08/23 1902    Specimen: Blood Updated: 10/08/23 1914     WBC 11.18 10*3/mm3      RBC 4.38 10*6/mm3      Hemoglobin 13.3 g/dL      Hematocrit 42.8 %      MCV 97.7 fL      MCH 30.4 pg      MCHC 31.1 g/dL      RDW 13.5 %      RDW-SD 48.5 fl      MPV 12.1 fL      Platelets 245 10*3/mm3      Neutrophil % 69.1 %      Lymphocyte % 20.4 %      Monocyte % 8.3 %      Eosinophil % 1.1 %      Basophil % 0.7 %      Immature Grans % 0.4 %      Neutrophils, Absolute 7.73 10*3/mm3      Lymphocytes, Absolute 2.28 10*3/mm3      Monocytes, Absolute 0.93 10*3/mm3      Eosinophils, Absolute 0.12 10*3/mm3      Basophils, Absolute 0.08 10*3/mm3      Immature Grans, Absolute 0.04 10*3/mm3      nRBC  "0.0 /100 WBC     Blood Culture - Blood, Hand, Right [[id number]] Collected: 10/08/23 1846    Specimen: Blood from Hand, Right Updated: 10/08/23 1913    Procalcitonin [[id number]]  (Abnormal) Collected: 10/08/23 1736    Specimen: Blood Updated: 10/08/23 1850     Procalcitonin 0.27 ng/mL     Narrative:      As a Marker for Sepsis (Non-Neonates):    1. <0.5 ng/mL represents a low risk of severe sepsis and/or septic shock.  2. >2 ng/mL represents a high risk of severe sepsis and/or septic shock.    As a Marker for Lower Respiratory Tract Infections that require antibiotic therapy:    PCT on Admission    Antibiotic Therapy       6-12 Hrs later    >0.5                Strongly Recommended  >0.25 - <0.5        Recommended   0.1 - 0.25          Discouraged              Remeasure/reassess PCT  <0.1                Strongly Discouraged     Remeasure/reassess PCT    As 28 day mortality risk marker: \"Change in Procalcitonin Result\" (>80% or <=80%) if Day 0 (or Day 1) and Day 4 values are available. Refer to http://www.[facility]-pct-calculator.com    Change in PCT <=80%  A decrease of PCT levels below or equal to 80% defines a positive change in PCT test result representing a higher risk for 28-day all-cause mortality of patients diagnosed with severe sepsis for septic shock.    Change in PCT >80%  A decrease of PCT levels of more than 80% defines a negative change in PCT result representing a lower risk for 28-day all-cause mortality of patients diagnosed with severe sepsis or septic shock.       [place] Draw [[id number]] Collected: 10/08/23 1736    Specimen: Blood Updated: 10/08/23 1845    Narrative:      The following orders were created for panel order [place] Draw.  Procedure                               Abnormality         Status                     ---------                               -----------         ------                     Green Top (Gel)[[id number]]                                  Final result               Lavender " Top[[id number]]                                     Final result               Red Top[[id number]]                                          Final result               Gordon Blood Culture Tera...[[id number]]                      Final result               Gray Top[[id number]]                                         In process                 Light Blue Top[[id number]]                                   Final result                 Please view results for these tests on the individual orders.    Green Top (Gel) [[id number]] Collected: 10/08/23 1736    Specimen: Blood Updated: 10/08/23 1845     Extra Tube Hold for add-ons.     Comment: Auto resulted.       Lavender Top [[id number]] Collected: 10/08/23 1736    Specimen: Blood Updated: 10/08/23 1845     Extra Tube hold for add-on     Comment: Auto resulted       Red Top [[id number]] Collected: 10/08/23 1736    Specimen: Blood Updated: 10/08/23 1845     Extra Tube Hold for add-ons.     Comment: Auto resulted.       Gordon Blood Culture Bottle Set [[id number]] Collected: 10/08/23 1736    Specimen: Blood from Hand, Right Updated: 10/08/23 1845     Extra Tube Hold for add-ons.     Comment: Auto resulted.       Light Blue Top [[id number]] Collected: 10/08/23 1736    Specimen: Blood Updated: 10/08/23 1845     Extra Tube Hold for add-ons.     Comment: Auto resulted       Lactic Acid, Plasma [[id number]]  (Normal) Collected: 10/08/23 1736    Specimen: Blood Updated: 10/08/23 1839     Lactate 1.6 mmol/L     hCG, Serum, Qualitative [[id number]]  (Normal) Collected: 10/08/23 1736    Specimen: Blood Updated: 10/08/23 1835     HCG Qualitative Negative    Blood Culture - Blood, Hand, Right [[id number]] Collected: 10/08/23 1736    Specimen: Blood from Hand, Right Updated: 10/08/23 1830    Gray Top [[id number]] Collected: 10/08/23 1736    Specimen: Blood Updated: 10/08/23 1745          Imaging Results (Last 24 Hours)       Procedure Component Value Units Date/Time    XR Chest 1 View [[id number]]  Collected: 10/08/23 1910     Updated: 10/08/23 1914    Narrative:      EXAMINATION: XR CHEST 1 VW-  10/8/2023 7:10 PM CDT     HISTORY: Fever in nursing home patient.     FINDINGS: Today's exam is compared to previous study of 10/1/2023.  Upright frontal projection of the chest reveals a tracheostomy tube in  place. Lungs are clear. No consolidative pneumonia or effusion.       Impression:      1. No acute disease.     This report was signed and finalized on 10/8/2023 7:11 PM CDT by Dr. Yuniel De Jesus MD.             I have personally reviewed and interpreted the radiology studies and ECG obtained at time of admission.     Assessment / Plan   Assessment:   Active Hospital Problems    Diagnosis     **Sepsis secondary to UTI     Anoxic brain injury     Functional quadriplegia     Tracheostomy present     PEG tube malfunction     Dysphagia     Essential (primary) hypertension      Treatment Plan  The patient will be [facility]   Admit to critical care  Vitals per protocol  IV access > 2 peripherals  NPO  PEG tube will need surgical review in AM. GI not available this week.   IVF sepsis bolus given in ER, continue with NS at 125 cc.hour  Consider Levophed if pressure does not sustain above MAP 60    Levaquin IVPB for UTI/Sepsis due to allergies  Will request pharmacist consult to evaluate list allergies and tolerances in case of needing cephalosporins or carbapenems for resistant bacteria. Records show a previous infection with Klebsialla and Proteous with limited sensitivities.  states she had some reaction to an antibiotics at an outside facility, not sure which.  Follow blood and urine cultures  CT abd/pelvis to looks for stones, hydronephrosis, stranding or other signs of complicated infection    Pepcid 20 mg IVP BID  Lovenox 40 mg SQ daily DVT prophylaxis    Home medications list needs to be verified since currently listing Propofol does not appear  accurate.    Medical Decision Making  Number and Complexity of problems: 4 complex problems  Differential Diagnosis: See above    Conditions and Status        Condition is unchanged.     MDM Data  External documents reviewed: [employer] EHR  Cardiac tracing (EKG, telemetry) interpretation: NSR  Radiology interpretation: See above  Labs reviewed: See above  Any tests that were considered but not ordered: None     Decision rules/scores evaluated (example [id number], Wells, etc): See above     Discussed with: Patient's  at bedside     Care Planning  Shared decision making: POA  Code status and discussions: DNR no CPR or intubation per  request tonight    Disposition  Social Determinants of Health that impact treatment or disposition: bed bound status and complex medical care  Estimated length of stay is over 2 midnights.     I confirmed that the patient's advanced care plan is present, code status is documented, and a surrogate decision maker is listed in the patient's medical record.     The patient's surrogate decision maker is Grant Zabala, spouse.     The patient was seen and examined by me on 10/08/2023 at 2029.    Electronically signed by David Hernández MD, 10/08/23, 20:29 CDT.

## 2023-10-09 NOTE — ED NOTES
Nursing report ED to floor  Namita Zabala  48 y.o.  female    HPI:   Chief Complaint   Patient presents with    G TUBE DISPLACEMENT       Admitting doctor:   David Hernández MD    Consulting provider(s):  Consults       Date and Time Order Name Status Description    10/8/2023  9:32 PM Inpatient General Surgery Consult               Admitting diagnosis:   The primary encounter diagnosis was Pyelonephritis. Diagnoses of Gastrojejunostomy tube dislodgement, Hypernatremia, and Sepsis, due to unspecified organism, unspecified whether acute organ dysfunction present were also pertinent to this visit.    Code status:   Current Code Status       Date Active Code Status Order ID Comments User Context       10/8/2023 2131 No CPR (Do Not Attempt to Resuscitate) [id number]  David Hernández MD ED        Question Answer    Code Status (Patient has no pulse and is not breathing) No CPR (Do Not Attempt to Resuscitate)    Medical Interventions (Patient has pulse or is breathing) Limited Support    Medical Intervention Limits: NO intubation (DNI)     NO cardioversion                    Allergies:   Coconut, Nuts, Penicillins, and [place]    Intake and Output    Intake/Output Summary (Last 24 hours) at 10/8/2023 2141  Last data filed at 10/8/2023 2030  Gross per 24 hour   Intake 1000 ml   Output --   Net 1000 ml       Weight:       10/08/23  1704   Weight: 64.4 kg (142 lb)       Most recent vitals:   Vitals:    10/08/23 1855 10/08/23 1950 10/08/23 2001 10/08/23 2100   BP:  105/71 107/71    Pulse:  105 104 97   Resp:       Temp: 100.4 øF (38 øC)      TempSrc: Rectal      SpO2:  100% 100% 100%   Weight:       Height:         Oxygen Therapy: .    Active LDAs/IV Access:   Lines, Drains & Airways       Active LDAs       Name Placement date Placement time Site Days    Peripheral IV 10/08/23 Posterior;Right Hand 10/08/23  --  Hand  less than 1    Nephrostomy Left --  present on assessment  --  present on assessment   "Left  --    ETT  04/19/22 2030  -- 537    Surgical Airway Shiley Uncuffed  --  --  --  8.5  --                    Labs (abnormal labs have a star):   Labs Reviewed   COMPREHENSIVE METABOLIC PANEL - Abnormal; Notable for the following components:       Result Value    Glucose 131 (*)     BUN 63 (*)     Sodium 160 (*)     Chloride 122 (*)     BUN/Creatinine Ratio 67.0 (*)     All other components within normal limits    Narrative:     GFR Normal >60  Chronic Kidney Disease <60  Kidney Failure <15     URINALYSIS W/ CULTURE IF INDICATED - Abnormal; Notable for the following components:    Color, UA Orange (*)     Appearance, UA Turbid (*)     pH, UA 8.5 (*)     Blood, UA Large (3+) (*)     Protein, UA >=300 mg/dL (3+) (*)     Leuk Esterase, UA Large (3+) (*)     Nitrite, UA Positive (*)     All other components within normal limits    Narrative:     Dipstick results may be inaccurate due to color interference.   PROCALCITONIN - Abnormal; Notable for the following components:    Procalcitonin 0.27 (*)     All other components within normal limits    Narrative:     As a Marker for Sepsis (Non-Neonates):    1. <0.5 ng/mL represents a low risk of severe sepsis and/or septic shock.  2. >2 ng/mL represents a high risk of severe sepsis and/or septic shock.    As a Marker for Lower Respiratory Tract Infections that require antibiotic therapy:    PCT on Admission    Antibiotic Therapy       6-12 Hrs later    >0.5                Strongly Recommended  >0.25 - <0.5        Recommended   0.1 - 0.25          Discouraged              Remeasure/reassess PCT  <0.1                Strongly Discouraged     Remeasure/reassess PCT    As 28 day mortality risk marker: \"Change in Procalcitonin Result\" (>80% or <=80%) if Day 0 (or Day 1) and Day 4 values are available. Refer to http://www.[employer]-pct-calculator.com    Change in PCT <=80%  A decrease of PCT levels below or equal to 80% defines a positive change in PCT test result representing a " higher risk for 28-day all-cause mortality of patients diagnosed with severe sepsis for septic shock.    Change in PCT >80%  A decrease of PCT levels of more than 80% defines a negative change in PCT result representing a lower risk for 28-day all-cause mortality of patients diagnosed with severe sepsis or septic shock.      CBC WITH AUTO DIFFERENTIAL - Abnormal; Notable for the following components:    WBC 11.18 (*)     MCV 97.7 (*)     MCHC 31.1 (*)     MPV 12.1 (*)     Neutrophils, Absolute 7.73 (*)     Monocytes, Absolute 0.93 (*)     All other components within normal limits   URINALYSIS, MICROSCOPIC ONLY - Abnormal; Notable for the following components:    RBC, UA Too Numerous to Count (*)     WBC, UA Too Numerous to Count (*)     Bacteria, UA 4+ (*)     Mucus, UA Small/1+ (*)     All other components within normal limits   COVID-19 AND FLU A/B, NP SWAB IN TRANSPORT MEDIA 8-12 HR TAT - Normal    Narrative:     Fact sheet for providers: https://www.fda.gov/media/095290/download    Fact sheet for patients: https://www.fda.gov/media/693146/download    Test performed by PCR.  Fact sheet for providers: https://www.fda.gov/media/236786/download    Fact sheet for patients: https://www.fda.gov/media/650493/download    Test performed by PCR.   LACTIC ACID, PLASMA - Normal   HCG, SERUM, QUALITATIVE - Normal   COVID PRE-OP / PRE-PROCEDURE SCREENING ORDER (NO ISOLATION)    Narrative:     The following orders were created for panel order COVID PRE-OP / PRE-PROCEDURE SCREENING ORDER (NO ISOLATION) - Swab, Nasopharynx.  Procedure                               Abnormality         Status                     ---------                               -----------         ------                     COVID-19 and FLU A/B PCR...[[id number]]  Normal              Final result                 Please view results for these tests on the individual orders.   BLOOD CULTURE   BLOOD CULTURE   URINE CULTURE   RAINBOW DRAW    Narrative:     The  following orders were created for panel order [place] Draw.  Procedure                               Abnormality         Status                     ---------                               -----------         ------                     Green Top (Gel)[[id number]]                                  Final result               Lavender Top[[id number]]                                     Final result               Red Top[[id number]]                                          Final result               [place] Blood Culture Tera...[[id number]]                      Final result               Gray Top[[id number]]                                         In process                 Light Blue Top[[id number]]                                   Final result                 Please view results for these tests on the individual orders.   GREEN TOP   LAVENDER TOP   RED TOP   RAINBOW BLOOD CULTURE BOTTLES - 1 SET   LIGHT BLUE TOP   CBC AND DIFFERENTIAL    Narrative:     The following orders were created for panel order CBC & Differential.  Procedure                               Abnormality         Status                     ---------                               -----------         ------                     CBC Auto Differential[[id number]]        Abnormal            Final result                 Please view results for these tests on the individual orders.   GRAY TOP       Meds given in ED:   Medications   sodium chloride 0.9 % flush 10 mL (has no administration in time range)   levoFLOXacin (LEVAQUIN) 750 mg/150 mL D5W (premix) (LEVAQUIN) 750 mg (750 mg Intravenous New Bag 10/8/23 2048)   iopamidol (ISOVUE-300) 61 % injection 100 mL (has no administration in time range)   nitroglycerin (NITROSTAT) SL tablet 0.4 mg (has no administration in time range)   sodium chloride 0.9 % flush 10 mL (has no administration in time range)   sodium chloride 0.9 % flush 10 mL (has no administration in time range)   sodium chloride 0.9 % infusion 40 mL (has  no administration in time range)   mupirocin (BACTROBAN) 2 % nasal ointment 1 application  (has no administration in time range)   Chlorhexidine Gluconate Cloth 2 % pads 1 application  (has no administration in time range)   Chlorhexidine Gluconate Cloth 2 % pads 1 application  (has no administration in time range)   sennosides-docusate (PERICOLACE) 8.6-50 MG per tablet 2 tablet (has no administration in time range)     And   polyethylene glycol (MIRALAX) packet 17 g (has no administration in time range)     And   bisacodyl (DULCOLAX) EC tablet 5 mg (has no administration in time range)     And   bisacodyl (DULCOLAX) suppository 10 mg (has no administration in time range)   Enoxaparin Sodium (LOVENOX) syringe 40 mg (has no administration in time range)   Potassium Replacement - Follow Nurse / BPA Driven Protocol (has no administration in time range)   Magnesium Standard Dose Replacement - Follow Nurse / BPA Driven Protocol (has no administration in time range)   Phosphorus Replacement - Follow Nurse / BPA Driven Protocol (has no administration in time range)   Calcium Replacement - Follow Nurse / BPA Driven Protocol (has no administration in time range)   acetaminophen (TYLENOL) tablet 650 mg (has no administration in time range)     Or   acetaminophen (TYLENOL) suppository 650 mg (has no administration in time range)   famotidine (PEPCID) injection 20 mg (has no administration in time range)   levoFLOXacin (LEVAQUIN) 750 mg/150 mL D5W (premix) (LEVAQUIN) 750 mg (has no administration in time range)   Morphine sulfate (PF) injection 2 mg (has no administration in time range)   acetaminophen (TYLENOL) suppository 650 mg (650 mg Rectal Given 10/8/23 1855)   sodium chloride 0.9 % bolus 1,000 mL (0 mL Intravenous Stopped 10/8/23 2030)   sodium chloride 0.9 % bolus 1,000 mL (1,000 mL Intravenous New Bag 10/8/23 2107)           NIH Stroke Scale:       Isolation/Infection(s):  No active isolations   COVID (History), ESBL  Klebsiella, COVID (confirmed)     COVID Testing  Collected .  Resulted .    Nursing report ED to floor:  Mental status: .  Ambulatory status: .  Precautions: .    ED nurse phone extentsion- ..

## 2023-10-09 NOTE — CASE MANAGEMENT/SOCIAL WORK
Discharge Planning Assessment  [facility]     Patient Name: Namita Zabala  MRN: [id number]  Today's Date: 10/9/2023    Admit Date: 10/8/2023        Discharge Needs Assessment       Row Name 10/09/23 1143       Living Environment    People in Home facility resident    Current Living Arrangements extended care facility    Potentially Unsafe Housing Conditions none    Provides Primary Care For no one, unable/limited ability to care for self    Family Caregiver if Needed spouse    Quality of Family Relationships helpful;involved;supportive    Able to Return to Prior Arrangements yes       Resource/Environmental Concerns    Resource/Environmental Concerns none    Transportation Concerns none       Discharge Needs Assessment    Readmission Within the Last 30 Days no previous admission in last 30 days    Current Outpatient/Agency/Support Group skilled nursing facility    Equipment Currently Used at Home oxygen;respiratory supplies;nutrition supplies    Concerns to be Addressed no discharge needs identified    Anticipated Changes Related to Illness none    Equipment Needed After Discharge none    Outpatient/Agency/Support Group Needs skilled nursing facility    Discharge Facility/Level of Care Needs nursing facility, skilled    Discharge Coordination/Progress PT is a current resident of [facility] and has a bed hold. PT may return to SNF once medically ready. SW will follow and assist as needed.                   Discharge Plan    No documentation.                 Continued Care and Services - Admitted Since 10/8/2023    Coordination has not been started for this encounter.          Demographic Summary    No documentation.                  Functional Status    No documentation.                  Psychosocial    No documentation.                  Abuse/Neglect    No documentation.                  Legal    No documentation.                  Substance Abuse    No documentation.                  Patient Forms    No  documentation.                     Maliha Fuller, CSW

## 2023-10-09 NOTE — PLAN OF CARE
Problem: Adjustment to Illness (Sepsis/Septic Shock)  Goal: Optimal Coping  Outcome: Ongoing, Progressing     Problem: Bleeding (Sepsis/Septic Shock)  Goal: Absence of Bleeding  Outcome: Ongoing, Progressing     Problem: Glycemic Control Impaired (Sepsis/Septic Shock)  Goal: Blood Glucose Level Within Desired Range  Outcome: Ongoing, Progressing     Problem: Infection Progression (Sepsis/Septic Shock)  Goal: Absence of Infection Signs and Symptoms  Outcome: Ongoing, Progressing     Problem: Nutrition Impaired (Sepsis/Septic Shock)  Goal: Optimal Nutrition Intake  Outcome: Ongoing, Progressing   Goal Outcome Evaluation:

## 2023-10-09 NOTE — CONSULTS
[facility] Palliative Care Services    Palliative Care Initial Consult   Attending Physician: Mik Meeks DO  Referring Provider: Mik Meeks DO    Reason for Referral: assistance with clarification of goals of care  Family/Support: Spouse, Grant    Code Status and Medical Interventions:   Ordered at: 10/08/23 2131     Medical Intervention Limits:    NO intubation (DNI)    NO cardioversion     Code Status (Patient has no pulse and is not breathing):    No CPR (Do Not Attempt to Resuscitate)     Medical Interventions (Patient has pulse or is breathing):    Limited Support     Goals of Care: TBD.    HPI:   48 y.o. female has a past medical history of Anoxic brain injury, tracheostomy present, chronic pulmonary embolism, Chronic respiratory failure,  Gastrointestinal hemorrhage, Iron deficiency anemia, Migraine, mitral valve prolapse, and dysphagia s/p PEG.  Resident of [facility] over the last one month.  ED visit 10/1 due to tracheostomy hemorrhage, UTI, and fever; ED visit 9/15 due to COVID-19, UTI, and acute hypernatremia.  Last seen by palliative services in April 2022 patient presented to [facility] on 10/8/2023 related to fever and elevated sodium noted since 9/7.  According to chart review patient's PEG tube was nonfunctional and dislodged.  ED work-up shows elevated sodium 160, urinalysis with 3+ blood and leuk esterase, nitrite positive, 4+ bacteria, leukocytosis.  Chest imaging negative for acute disease.  Admitted for sepsis secondary to UTI, treated with sepsis bolus in the ED, antibiotics, and IV fluids.  Blood and urine cultures pending.  CT abdomen pelvis shows bilateral nephrolithiasis, left-sided nephrostomy tube in place, nonobstructing calculi noted within the right kidney including a larger stone within the right renal pelvis, diffuse urothelial thickening suggesting pyelitis associated with the right renal pelvis and proximal  right ureter progressive from previous exam, underlying cystitis,  moderate constipation.  Leukocytosis has resolved.  Minimal improvement in hypernatremia. Seen by Dr. Dyer, urology due to complicated UTI and difficulty stratton catheter placement. Stratton catheter exchanged and recommends likely needs nephroureteral tube replaced as it appears last replaced in July. Previously managed by  and recommends transfer to tertiary facility ( or [place]). General surgery consulted for PEG placement.  Spoke with Dr. Meeks with regard to case who was able to speak with patient's spouse and has elected to pursue tertiary facility for higher level of care. Call placed to patient's spouse at number in EMR, no answer, unable to leave a voicemail.  Will attempt to call later.     Review of Systems   Unable to perform ROS: Acuity of condition     1- Pain Assessment  CPOT Facial Expression: 0-->relaxed, neutral  CPOT Body Movements: 0-->absence of movements  CPOT Muscle Tension: 2-->very tense or rigid  Ventilator Compliance/Vocalization: 0-->talking in normal tone or no sound  CPOT Score: 2    Past Medical History:   Diagnosis Date    Acute kidney failure, unspecified     Angina at rest     Anoxic brain damage, not elsewhere classified     Chronic pulmonary embolism     Chronic respiratory failure, unspecified whether with hypoxia or hypercapnia     Dependence on respirator (ventilator) status     Gastrointestinal hemorrhage, unspecified     Hypercalcemia     Iron deficiency anemia     Metabolic encephalopathy     Migraine     Sees Pain Management for injections.     MVP (mitral valve prolapse)     Other dysphagia     Other pulmonary embolism with acute cor pulmonale     Renal disorder     Syncope     Urinary tract infection, site not specified      Past Surgical History:   Procedure Laterality Date    BREAST BIOPSY Right 2011    benign    INSERTION HEMODIALYSIS CATHETER Left 9/16/2021    Procedure: HEMODIALYSIS  CATHETER INSERTION;  Surgeon: Anders Kaur MD;  Location: [facility] OR 12;  Service: Vascular;  Laterality: Left;    TONSILLECTOMY       Social History     Socioeconomic History    Marital status:    Tobacco Use    Smoking status: Never    Smokeless tobacco: Never   Vaping Use    Vaping Use: Never used   Substance and Sexual Activity    Alcohol use: No    Drug use: No         Current Facility-Administered Medications:     acetaminophen (TYLENOL) tablet 650 mg, 650 mg, Oral, Q4H PRN **OR** acetaminophen (TYLENOL) suppository 650 mg, 650 mg, Rectal, Q4H PRN, David Hernández MD, 650 mg at 10/09/23 0937    sennosides-docusate (PERICOLACE) 8.6-50 MG per tablet 2 tablet, 2 tablet, Oral, BID **AND** polyethylene glycol (MIRALAX) packet 17 g, 17 g, Oral, Daily PRN **AND** bisacodyl (DULCOLAX) EC tablet 5 mg, 5 mg, Oral, Daily PRN **AND** bisacodyl (DULCOLAX) suppository 10 mg, 10 mg, Rectal, Daily PRN, David Hernández MD    Calcium Replacement - Follow Nurse / BPA Driven Protocol, , Does not apply, PRN, David Hernández MD    Chlorhexidine Gluconate Cloth 2 % pads 1 application , 1 application , Topical, Q24H, David Hernández MD, 1 application  at 10/09/23 0315    Enoxaparin Sodium (LOVENOX) syringe 40 mg, 40 mg, Subcutaneous, Daily, David Hernández MD, 40 mg at 10/09/23 0937    famotidine (PEPCID) injection 20 mg, 20 mg, Intravenous, Q12H, David Hernández MD, 20 mg at 10/09/23 0937    levoFLOXacin (LEVAQUIN) 750 mg/150 mL D5W (premix) (LEVAQUIN) 750 mg, 750 mg, Intravenous, Q24H, David Hernández MD    Magnesium Standard Dose Replacement - Follow Nurse / BPA Driven Protocol, , Does not apply, PRN, David Hernández MD    Morphine sulfate (PF) injection 2 mg, 2 mg, Intravenous, Q2H PRN, David Hernández MD, 2 mg at 10/09/23 0937    mupirocin (BACTROBAN) 2 % nasal ointment 1 application , 1 application , Each Nare, BID, Edgar,  David Mojica MD, 1 application  at 10/09/23 0937    nitroglycerin (NITROSTAT) SL tablet 0.4 mg, 0.4 mg, Sublingual, Q5 Min Edgar HARMAN Mariano Ariel, MD    Phosphorus Replacement - Follow Nurse / BPA Driven Protocol, , Does not apply, Edgar HARMAN Mariano Ariel, MD    potassium chloride 10 mEq in 100 mL IVPB, 10 mEq, Intravenous, Q1H, David Hernández MD, Last Rate: 100 mL/hr at 10/09/23 0937, 10 mEq at 10/09/23 0937    Potassium Replacement - Follow Nurse / BPA Driven Protocol, , Does not apply, Edgar HARMAN Mariano Ariel, MD    [COMPLETED] Insert Peripheral IV, , , Once **AND** sodium chloride 0.9 % flush 10 mL, 10 mL, Intravenous, PRN, EnglishRossy E, APRN    sodium chloride 0.9 % flush 10 mL, 10 mL, Intravenous, Q12H, David Hernández MD, 10 mL at 10/09/23 0938    sodium chloride 0.9 % flush 10 mL, 10 mL, Intravenous, PRNEdgar Mariano Ariel, MD    sodium chloride 0.9 % infusion 40 mL, 40 mL, Intravenous, PRNEdgar Mariano Ariel, MD    sodium chloride 0.9 % infusion, 125 mL/hr, Intravenous, Continuous, David Hernández MD, Last Rate: 125 mL/hr at 10/08/23 2240, 125 mL/hr at 10/08/23 2240    Allergies   Allergen Reactions    Coconut Unknown - High Severity    Nuts Unknown - High Severity    Penicillins     [place] Other (See Comments)     Causes migraines per pt reports     I have utilized all available immediate resources to obtain, update, or review the patient's current medications (including all prescriptions, over-the-counter products, herbals, cannabis/cannabidiol products, and vitamin/mineral/dietary (nutritional) supplements) for name, route of administration, type, dose and frequency.      Intake/Output Summary (Last 24 hours) at 10/9/2023 1052  Last data filed at 10/9/2023 0823  Gross per 24 hour   Intake 1100 ml   Output 225 ml   Net 875 ml       Physical Exam:    Diagnostics: Reviewed  /98   Pulse (!) 123   Temp (!) 101.6 øF (38.7 øC)   Resp 20    "Ht 152.4 cm (60\")   Wt 59.6 kg (131 lb 4.8 oz)   LMP  (LMP Unknown)   SpO2 99%   BMI 25.64 kg/mý     Vitals and nursing note reviewed.   Constitutional:       Appearance: Chronically ill-appearing.      Comments: Trach collar   Eyes:      General: Lids are normal.   Neck:      Trachea: Tracheostomy present.      Comments: Torticollis  Pulmonary:      Effort: Pulmonary effort is normal.      Breath sounds: Decreased breath sounds present.   Cardiovascular:      Tachycardia present.   Edema:     Peripheral edema present.  Musculoskeletal:      Comments: Contractures of upper and lower extremities Skin:     General: Skin is warm.   Genitourinary:     Comments: Mojica catheter in place  Neurological:      Mental Status: Alert.      Comments: Nonverbal     Patient status: Disease state: Controlled with current treatments.  Current Functional status: Palliative Performance Scale Score: Performance 10% based on the following measures: Ambulation: Totally bed bound, Activity and Evidence of Disease: Unable to do any work, extensive evidence of disease, Self-Care: Total care required,  Intake: Mouth care only, LOC: Drowsy or comatose   Baseline Functional status: Palliative Performance Scale Score: Performance 10% based on the following measures: Ambulation: Totally bed bound, Activity and Evidence of Disease: Unable to do any work, extensive evidence of disease, Self-Care: Total care required,  Intake: Mouth care only, LOC: Drowsy or comatose   Nutritional status: Albumin 4.1 Body mass index is 25.64 kg/mý.         Hospital Problem List      Sepsis secondary to UTI    Essential (primary) hypertension    Anoxic brain injury    Functional quadriplegia    Tracheostomy present    PEG tube malfunction    Dysphagia    Impression/Problem List:    Sepsis secondary to UTI     History of anoxic brain injury  Tracheostomy present  Functional quadriplegia  Hypertension  Bilateral Nephrolithiasis, left-sided nephrostomy tube in " place  Chronic pulmonary embolism  Chronic respiratory failure  History of Gastrointestinal hemorrhage  Iron deficiency anemia  Migraine  Mitral valve prolapse  Dysphagia s/p PEG  Contractures  Debility    Recommendations/Plan:  1. plan: Goals of care include CODE STATUS no CPR/limited support interventions.    Family support: The patient receives support from her ..  Advance Directives: Advance Directive Status: Patient does not have advance directive   POA/Healthcare surrogate-spouse, Grant.    2.  Palliative care encounter  - Prognosis is guarded long-term secondary to sepsis, history of anoxic brain injury, tracheostomy, quadriplegia, multiple comorbidities, contractures, and debility.    -Resident of [facility].     -treated with sepsis bolus in the ED, antibiotics, and IV fluids.  Blood and urine cultures pending.   -Seen by Dr. Dyer, urology due to complicated UTI and difficulty stratton catheter placement. Stratton catheter exchanged and recommends likely needs nephroureteral tube replaced as it appears last replaced in July. Previously managed by  and recommends transfer to tertiary facility ( or [place]).     10/9-Dr. Meeks was able to speak with patient's spouse and has elected to pursue tertiary facility for higher level of care.   -Anticipating transfer to  when ICU bed available  -Call placed to patient's spouse at number in EMR by this provider, no answer, unable to leave a voicemail.  Will attempt to call later.    -Would benefit from completion of a MOST document.      Thank you for this consult and allowing us to participate in patient's plan of care. Palliative Care Team will continue to follow patient.     Roxane Sahni, BERT  10/9/2023  10:52 CDT

## 2023-10-09 NOTE — PLAN OF CARE
Problem: Fall Injury Risk  Goal: Absence of Fall and Fall-Related Injury  Outcome: Ongoing, Progressing  Intervention: Identify and Manage Contributors  Intervention: Promote Injury-Free Environment     Problem: Adjustment to Illness (Sepsis/Septic Shock)  Goal: Optimal Coping  Outcome: Ongoing, Progressing     Problem: Bleeding (Sepsis/Septic Shock)  Goal: Absence of Bleeding  Outcome: Ongoing, Progressing     Problem: Glycemic Control Impaired (Sepsis/Septic Shock)  Goal: Blood Glucose Level Within Desired Range  Outcome: Ongoing, Progressing     Problem: Infection Progression (Sepsis/Septic Shock)  Goal: Absence of Infection Signs and Symptoms  Outcome: Ongoing, Progressing  Intervention: Promote Recovery     Problem: Nutrition Impaired (Sepsis/Septic Shock)  Goal: Optimal Nutrition Intake  Outcome: Ongoing, Progressing     Problem: Pain Chronic (Persistent) (Comorbidity Management)  Goal: Acceptable Pain Control and Functional Ability  Outcome: Ongoing, Progressing  Intervention: Manage Persistent Pain  Intervention: Develop Pain Management Plan     Problem: Skin Injury Risk Increased  Goal: Skin Health and Integrity  Outcome: Ongoing, Progressing  Intervention: Optimize Skin Protection   Goal Outcome Evaluation:

## 2023-10-09 NOTE — PLAN OF CARE
Problem: Fall Injury Risk  Goal: Absence of Fall and Fall-Related Injury  Outcome: Adequate for Care Transition     Problem: Adjustment to Illness (Sepsis/Septic Shock)  Goal: Optimal Coping  Outcome: Adequate for Care Transition     Problem: Bleeding (Sepsis/Septic Shock)  Goal: Absence of Bleeding  Outcome: Adequate for Care Transition     Problem: Glycemic Control Impaired (Sepsis/Septic Shock)  Goal: Blood Glucose Level Within Desired Range  Outcome: Adequate for Care Transition     Problem: Infection Progression (Sepsis/Septic Shock)  Goal: Absence of Infection Signs and Symptoms  Outcome: Adequate for Care Transition     Problem: Nutrition Impaired (Sepsis/Septic Shock)  Goal: Optimal Nutrition Intake  Outcome: Adequate for Care Transition     Problem: Pain Chronic (Persistent) (Comorbidity Management)  Goal: Acceptable Pain Control and Functional Ability  Outcome: Adequate for Care Transition     Problem: Skin Injury Risk Increased  Goal: Skin Health and Integrity  Outcome: Adequate for Care Transition     Problem: Adult Inpatient Plan of Care  Goal: Plan of Care Review  Outcome: Adequate for Care Transition  Goal: Patient-Specific Goal (Individualized)  Outcome: Adequate for Care Transition  Goal: Absence of Hospital-Acquired Illness or Injury  Outcome: Adequate for Care Transition  Goal: Optimal Comfort and Wellbeing  Outcome: Adequate for Care Transition  Goal: Readiness for Transition of Care  Outcome: Adequate for Care Transition   Goal Outcome Evaluation:

## 2023-10-09 NOTE — PROGRESS NOTES
1215- pt received to pacu, resp easy, unlabored,. Vss. Pt appears in no acute distress. 1227-pt tearful, reports no improvement in pain     1254-pt tolerating ice chips/water. Reports pain has become tolerable. Sitting up in bed. resp easy, eyes open.     1319-pt meets criteria for discharge from pacu, returned to [facility], report given to Walt Carreon I spoke with Dr. Brand through the transfer center at .  We discussed our lack of capability regarding the patient's care needs.  Dr. Brand was agreeable to accept the patient in transfer when an ICU bed is available.  They will make arrangements for PEG tube replacement and nephroureteral tube exchange after the patient arrives at their facility.    Electronically signed by Mik Meeks DO, 10/09/23, 13:07 CDT.

## 2023-10-09 NOTE — DISCHARGE SUMMARY
[facility] Medicine Services  DISCHARGE SUMMARY       Date of Admission: 10/8/2023  Date of Discharge:  10/9/2023  Primary Care Physician: David Lara MD    Discharge Diagnoses:  Active Hospital Problems    Diagnosis     **Sepsis secondary to UTI     Infection associated with nephrostomy catheter     Anoxic brain injury     Functional quadriplegia     Tracheostomy present     PEG tube malfunction     Dysphagia     Essential (primary) hypertension          Presenting Problem/History of Present Illness:  Sepsis secondary to UTI [A41.9, N39.0]     Chief Complaint on Day of Discharge:   Fever, sepsis    History of Present Illness on Day of Discharge:   The patient remains febrile with maximum temperature of 101.6 over the past 24 hours.  She appears to be acutely ill and meets criteria for sepsis.  At admission her PEG tube was noted to be dislodged and nonfunctional.  Mojica was out and could not be replaced in the ER.  Urology was consulted and Mojica has been successfully placed.  Case discussed extensively with urology.  Gastroenterology is not available this week and general surgery has been consulted for replacement of PEG tube.  The patient has a long and complex medical history.  Until the past month, the patient was a resident in a long-term care facility near [place], [place] where she was treated for a prolonged period of time at .  The patient was apparently transferred to [facility] for continued rehabilitation and treatment.  There is no record of a plan for replacement of nephroureteral tube as recommended.  She was admitted to our facility with a complicated UTI.  Urinary output is managed with a Mojica catheter and left nephroureteral tube.  Her last tube exchange was in July, 2023.  Nephroureteral tube was supposed to be changed every 90 days and we are not aware of a scheduled change.  Our facility does not have the capability for nephroureteral tube  exchange.  If the patient's family wishes to continue aggressive treatment, the patient will require transfer to .  Palliative care has been consulted to assist with discussing further treatment desires that the family may have.  If they wish to continue aggressive treatment, the patient would be better served in a long-term care facility that is closer to  where advanced treatments can be rendered.  White blood cell count within normal limits.  Hemoglobin 10.9.  BMP significant for elevated sodium of 156 and chloride 122.     Treatment Plan  Discontinue normal saline IV  D5W at 100 cc/h  Palliative care consultation to determine scope of care.  If aggressive care is desired, the patient will need to be transferred to  as we do not have the capability to care for her here.  General surgery consultation for PEG tube replacement is pending.  Case discussed with urology    The patient's family agreed that they wanted to continue aggressive care, to that end, transfer center at  was contacted.  I discussed with Dr. Brand our lack of capability regarding the patient's current care needs.  Dr. Brand was agreeable to accept the patient in transfer.  An ICU bed has become available and the patient is appropriate for transfer to that facility.  We are unable to replace the patient's PEG tube as gastroenterology is not available this week.  We are not capable of exchanging the nephroureteral tube as interventional radiology does not perform that procedure here.  We have been contacted by the  transfer center.  A bed is now available and the patient is appropriate for transfer to that facility for a higher level of care.    Hospital Course  48 year old female with PMH of anoxic brain injury, tracheostomy, PEG tube, CRF, that presents to the ER with fevers. She is non verbal and currently does only grimace. Presents with temp of 101.4F, /71, , O2 saturation 100% on concentrator. WBC is 11, Sodium 160,  BUN 63, creatinine 0.94. Urine murky and malodorous with UA consistent with UTI. Her blood pressure after 2 L NS bolus cotinued to trend down and is now 90/56.      Her PEG tube was non functional and dislodged, a replacement with Mojica could not be secured in the ER. She will need replacement.   Treatment Plan  The patient will be admitted to my service here at [facility].   Admit to critical care  Vitals per protocol  IV access > 2 peripherals  NPO  PEG tube will need surgical review in AM. GI not available this week.   IVF sepsis bolus given in ER, continue with NS at 125 cc.hour  Consider Levophed if pressure does not sustain above MAP 60     Levaquin IVPB for UTI/Sepsis due to allergies  Will request pharmacist consult to evaluate list allergies and tolerances in case of needing cephalosporins or carbapenems for resistant bacteria. Records show a previous infection with Klebsialla and Proteous with limited sensitivities.  states she had some reaction to an antibiotics at an outside facility, not sure which.  Follow blood and urine cultures  CT abd/pelvis to looks for stones, hydronephrosis, stranding or other signs of complicated infection     Pepcid 20 mg IVP BID  Lovenox 40 mg SQ daily DVT prophylaxis      Consults:   Palliative care:  Impression/Problem List:     Sepsis secondary to UTI     History of anoxic brain injury  Tracheostomy present  Functional quadriplegia  Hypertension  Bilateral Nephrolithiasis, left-sided nephrostomy tube in place  Chronic pulmonary embolism  Chronic respiratory failure  History of Gastrointestinal hemorrhage  Iron deficiency anemia  Migraine  Mitral valve prolapse  Dysphagia s/p PEG  Contractures  Debility     Recommendations/Plan:  1. plan: Goals of care include CODE STATUS no CPR/limited support interventions.     Family support: The patient receives support from her ..  Advance Directives: Advance Directive Status: Patient does not have advance  "directive   POA/Healthcare surrogate-spouse, Grant.     2.  Palliative care encounter  - Prognosis is guarded long-term secondary to sepsis, history of anoxic brain injury, tracheostomy, quadriplegia, multiple comorbidities, contractures, and debility.     -Resident of [facility].      -treated with sepsis bolus in the ED, antibiotics, and IV fluids.  Blood and urine cultures pending.   -Seen by Dr. Dyer, urology due to complicated UTI and difficulty stratton catheter placement. Stratton catheter exchanged and recommends likely needs nephroureteral tube replaced as it appears last replaced in July. Previously managed by  and recommends transfer to tertiary facility ( or [place]).      10/9-Dr. Meeks was able to speak with patient's spouse and has elected to pursue tertiary facility for higher level of care.   -Anticipating transfer to  when ICU bed available  -Call placed to patient's spouse at number in EMR by this provider, no answer, unable to leave a voicemail.  Will attempt to call later.    -Would benefit from completion of a MOST document.      Thank you for this consult and allowing us to participate in patient's plan of care. Palliative Care Team will continue to follow patient.      BERT Sapp    Result Review    Result Review:  I have personally reviewed the results from the time of this admission to 10/9/2023 15:27 CDT and agree with these findings:  []  Laboratory  []  Microbiology  []  Radiology  []  EKG/Telemetry   []  Cardiology/Vascular   []  Pathology  []  Old records  []  Other:    Condition on Discharge:    Stable    Physical Exam on Discharge:  /90   Pulse 120   Temp 100.1 øF (37.8 øC)   Resp 20   Ht 152.4 cm (60\")   Wt 59.6 kg (131 lb 4.8 oz)   LMP  (LMP Unknown)   SpO2 98%   BMI 25.64 kg/mý   Physical Exam     Constitutional:       Appearance: She is well-developed. She is ill-appearing.   HENT:      Head: Normocephalic and atraumatic.     "  Right Ear: External ear normal.      Left Ear: External ear normal.      Mouth: Mucous membranes are dry.   Eyes:      General: Conjunctivae clear bilaterally.  No scleral icterus.  Neck:      Comments: Neck contraction.  Cardiovascular:      Rate and Rhythm: Regular rhythm. Tachycardia present.      Heart sounds: No murmur heard.  Pulmonary:      Effort: No respiratory distress.      Breath sounds: No stridor. No wheezing, rhonchi or rales.      Comments: Tracheostomy in place  Abdominal:      General: Bowel sounds are normal. There is no distension.      Palpations: Abdomen is soft.      Tenderness: There is no abdominal tenderness. There is no guarding or rebound.   Musculoskeletal:         General: No swelling.  Contracture deformities of fingers, upper extremities and lower extremities.     Right lower leg: No edema.      Left lower leg: No edema.   Skin:     General: Skin is dry.      Coloration: Skin is pale.      Findings: No erythema or rash.   Neurological:      Mental Status: Mental status is at baseline.      Motor: No abnormal muscle tone.       Discharge Disposition:  Short Term Hospital (DC - External)    Discharge Medications:     Discharge Medications        Continue These Medications        Instructions Start Date   acetaminophen 500 MG tablet  Commonly known as: TYLENOL   500 mg, Per G Tube, Every 4 Hours PRN, Not to exceed 3000mg from all sources daily       albuterol (2.5 MG/3ML) 0.083% nebulizer solution  Commonly known as: PROVENTIL   2.5 mg, Nebulization, Every 4 Hours PRN      baclofen 20 MG tablet  Commonly known as: LIORESAL   20 mg, Per G Tube, Every 6 Hours      bisacodyl 10 MG suppository  Commonly known as: DULCOLAX   10 mg, Rectal, Every 48 Hours PRN      carboxymethylcellulose 0.5 % solution  Commonly known as: REFRESH PLUS   1 drop, Both Eyes, 2 Times Daily      chlorhexidine 0.12 % solution  Commonly known as: PERIDEX   15 mL, Mouth/Throat, 2 Times Daily, 15 ml using oral swab  twice daily for oral care      Docusate Sodium 150 MG/15ML syrup   200 mg, Oral, 2 Times Daily      Enoxaparin Sodium 40 MG/0.4ML solution prefilled syringe syringe  Commonly known as: LOVENOX   40 mg, Subcutaneous, Every Morning      ferrous sulfate 220 (44 Fe) MG/5ML liquid liquid   5 mL, Per G Tube, Every Morning      fleet enema 7-19 GM/118ML enema   1 enema, Rectal, Every 48 Hours PRN      hydrOXYzine pamoate 25 MG capsule  Commonly known as: VISTARIL   25 mg, Per G Tube, Every 6 Hours      metoprolol tartrate 50 MG tablet  Commonly known as: LOPRESSOR   50 mg, Per G Tube, 2 Times Daily      OCUSOFT LID SCRUB EX   1 Application, Both Eyes, Daily      ondansetron 4 MG/5ML solution  Commonly known as: ZOFRAN   4 mg, Oral, Every 6 Hours PRN      oxyCODONE 5 MG immediate release tablet  Commonly known as: ROXICODONE   5 mg, Oral, Every 6 Hours PRN      pantoprazole 2 mg/mL suspension suspension  Commonly known as: PROTONIX   40 mg, Per G Tube, 2 Times Daily      polyethylene glycol 17 GM/SCOOP powder  Commonly known as: MIRALAX   17 g, Per G Tube, Every Morning      potassium chloride 10 MEQ CR capsule  Commonly known as: MICRO-K   20 mEq, Oral, Every Morning, PER G-TUBE      RENACIDIN IR   60 mL, Irrigation, 2 Times Daily, Irrigate Mojica Catheter with 60 ml BID       rosuvastatin 5 MG tablet  Commonly known as: CRESTOR   5 mg, Per G Tube, Daily      saccharomyces boulardii 250 MG capsule  Commonly known as: FLORASTOR   250 mg, Per G Tube, Every Morning      simethicone 40 MG/0.6ML drops  Commonly known as: MYLICON   20 mg, Per G Tube, Every 6 Hours      sodium chloride 0.9 % irrigation  Commonly known as: NS   10 mL, Irrigation, Every 8 Hours, Irrigation for secretions: Hydration      sodium chloride 3 % nebulizer solution   4 mL, Nebulization, Every 6 Hours PRN      thiamine 100 MG tablet  tablet  Commonly known as: VITAMIN B-1   100 mg, Per G Tube, Every Morning      tiZANidine 4 MG tablet  Commonly known as:  ZANAFLEX   8 mg, Per G Tube, Every 8 Hours               Discharge Diet:   N.p.o.    Discharge Care Plan / Instructions:   Transfer to [facility] for higher level of care    Activity at Discharge:   Bedrest      Electronically signed by Mik Meeks DO, 10/09/23, 15:27 CDT.    Time: Discharge over 30 min    Part of this note may be an electronic transcription/translation of spoken language to printed text using the Dragon Dictation system.

## 2023-10-13 LAB
BACTERIA SPEC AEROBE CULT: ABNORMAL
BACTERIA SPEC AEROBE CULT: ABNORMAL
BACTERIA SPEC AEROBE CULT: NORMAL
BACTERIA SPEC AEROBE CULT: NORMAL

## 2023-11-16 PROCEDURE — 02HV33Z INSERTION OF INFUSION DEVICE INTO SUPERIOR VENA CAVA, PERCUTANEOUS APPROACH: ICD-10-PCS | Performed by: FAMILY MEDICINE

## 2023-11-25 ENCOUNTER — APPOINTMENT (OUTPATIENT)
Dept: GENERAL RADIOLOGY | Facility: HOSPITAL | Age: 48
DRG: 698 | End: 2023-11-25
Payer: MEDICARE

## 2023-11-25 ENCOUNTER — APPOINTMENT (OUTPATIENT)
Dept: CT IMAGING | Facility: HOSPITAL | Age: 48
DRG: 698 | End: 2023-11-25
Payer: MEDICARE

## 2023-11-25 ENCOUNTER — HOSPITAL ENCOUNTER (INPATIENT)
Facility: HOSPITAL | Age: 48
LOS: 10 days | Discharge: NURSING, REHAB OR LONG-TERM CARE | DRG: 698 | End: 2023-12-05
Attending: STUDENT IN AN ORGANIZED HEALTH CARE EDUCATION/TRAINING PROGRAM | Admitting: FAMILY MEDICINE
Payer: MEDICARE

## 2023-11-25 DIAGNOSIS — A41.9 SEPSIS, DUE TO UNSPECIFIED ORGANISM, UNSPECIFIED WHETHER ACUTE ORGAN DYSFUNCTION PRESENT: ICD-10-CM

## 2023-11-25 DIAGNOSIS — E43 SEVERE MALNUTRITION: ICD-10-CM

## 2023-11-25 DIAGNOSIS — R13.10 DYSPHAGIA, UNSPECIFIED TYPE: ICD-10-CM

## 2023-11-25 DIAGNOSIS — A41.9 SEPSIS SECONDARY TO UTI: ICD-10-CM

## 2023-11-25 DIAGNOSIS — A41.9 SEPSIS WITH ACUTE ORGAN DYSFUNCTION WITHOUT SEPTIC SHOCK, DUE TO UNSPECIFIED ORGANISM, UNSPECIFIED ORGAN DYSFUNCTION TYPE: Primary | ICD-10-CM

## 2023-11-25 DIAGNOSIS — I10 ESSENTIAL (PRIMARY) HYPERTENSION: ICD-10-CM

## 2023-11-25 DIAGNOSIS — R65.20 SEPSIS WITH ACUTE ORGAN DYSFUNCTION WITHOUT SEPTIC SHOCK, DUE TO UNSPECIFIED ORGANISM, UNSPECIFIED ORGAN DYSFUNCTION TYPE: Primary | ICD-10-CM

## 2023-11-25 DIAGNOSIS — N94.89 PELVIC HEMATOMA, FEMALE: ICD-10-CM

## 2023-11-25 DIAGNOSIS — E87.20 LACTIC ACIDOSIS: ICD-10-CM

## 2023-11-25 DIAGNOSIS — G93.1 ANOXIC BRAIN INJURY: ICD-10-CM

## 2023-11-25 DIAGNOSIS — D62 ACUTE BLOOD LOSS ANEMIA: ICD-10-CM

## 2023-11-25 DIAGNOSIS — A49.9 UTI (URINARY TRACT INFECTION), BACTERIAL: ICD-10-CM

## 2023-11-25 DIAGNOSIS — E83.39 HYPOPHOSPHATEMIA: ICD-10-CM

## 2023-11-25 DIAGNOSIS — J18.9 PNEUMONIA OF BOTH LUNGS DUE TO INFECTIOUS ORGANISM, UNSPECIFIED PART OF LUNG: ICD-10-CM

## 2023-11-25 DIAGNOSIS — M79.18 MYOFASCIAL PAIN SYNDROME: ICD-10-CM

## 2023-11-25 DIAGNOSIS — N17.9 AKI (ACUTE KIDNEY INJURY): ICD-10-CM

## 2023-11-25 DIAGNOSIS — E87.5 HYPERKALEMIA: ICD-10-CM

## 2023-11-25 DIAGNOSIS — E53.8 VITAMIN B12 DEFICIENCY: ICD-10-CM

## 2023-11-25 DIAGNOSIS — D89.834 CYTOKINE RELEASE SYNDROME, GRADE 4: ICD-10-CM

## 2023-11-25 DIAGNOSIS — K56.7 ILEUS: ICD-10-CM

## 2023-11-25 DIAGNOSIS — J96.01 ACUTE RESPIRATORY FAILURE WITH HYPOXIA: ICD-10-CM

## 2023-11-25 DIAGNOSIS — T83.098A MALFUNCTION OF NEPHROSTOMY TUBE: ICD-10-CM

## 2023-11-25 DIAGNOSIS — R74.01 ELEVATED TRANSAMINASE LEVEL: ICD-10-CM

## 2023-11-25 DIAGNOSIS — U07.1 COVID-19: ICD-10-CM

## 2023-11-25 DIAGNOSIS — N39.0 SEPSIS SECONDARY TO UTI: ICD-10-CM

## 2023-11-25 DIAGNOSIS — F41.9 ANXIETY: ICD-10-CM

## 2023-11-25 DIAGNOSIS — E87.6 HYPOKALEMIA: ICD-10-CM

## 2023-11-25 DIAGNOSIS — I34.1 MVP (MITRAL VALVE PROLAPSE): ICD-10-CM

## 2023-11-25 DIAGNOSIS — N39.0 UTI (URINARY TRACT INFECTION), BACTERIAL: ICD-10-CM

## 2023-11-25 DIAGNOSIS — Z93.0 TRACHEOSTOMY PRESENT: ICD-10-CM

## 2023-11-25 DIAGNOSIS — K94.23 PEG TUBE MALFUNCTION: ICD-10-CM

## 2023-11-25 DIAGNOSIS — R11.2 INTRACTABLE NAUSEA AND VOMITING: ICD-10-CM

## 2023-11-25 DIAGNOSIS — I26.99 OTHER ACUTE PULMONARY EMBOLISM, UNSPECIFIED WHETHER ACUTE COR PULMONALE PRESENT: ICD-10-CM

## 2023-11-25 DIAGNOSIS — T83.512A INFECTION ASSOCIATED WITH NEPHROSTOMY CATHETER, INITIAL ENCOUNTER: ICD-10-CM

## 2023-11-25 DIAGNOSIS — R53.2 FUNCTIONAL QUADRIPLEGIA: ICD-10-CM

## 2023-11-25 LAB
ALBUMIN SERPL-MCNC: 4.4 G/DL (ref 3.5–5.2)
ALBUMIN/GLOB SERPL: 1.3 G/DL
ALP SERPL-CCNC: 64 U/L (ref 39–117)
ALT SERPL W P-5'-P-CCNC: 23 U/L (ref 1–33)
ANION GAP SERPL CALCULATED.3IONS-SCNC: 11 MMOL/L (ref 5–15)
AST SERPL-CCNC: 17 U/L (ref 1–32)
B-HCG UR QL: NEGATIVE
BACTERIA UR QL AUTO: ABNORMAL /HPF
BASOPHILS # BLD AUTO: 0.07 10*3/MM3 (ref 0–0.2)
BASOPHILS NFR BLD AUTO: 0.7 % (ref 0–1.5)
BILIRUB SERPL-MCNC: 0.7 MG/DL (ref 0–1.2)
BILIRUB UR QL STRIP: ABNORMAL
BUN SERPL-MCNC: 48 MG/DL (ref 6–20)
BUN/CREAT SERPL: 77.4 (ref 7–25)
CALCIUM SPEC-SCNC: 10.5 MG/DL (ref 8.6–10.5)
CHLORIDE SERPL-SCNC: 115 MMOL/L (ref 98–107)
CLARITY UR: ABNORMAL
CO2 SERPL-SCNC: 31 MMOL/L (ref 22–29)
COD CRY URNS QL: ABNORMAL /HPF
COLOR UR: ABNORMAL
CREAT SERPL-MCNC: 0.62 MG/DL (ref 0.57–1)
D-LACTATE SERPL-SCNC: 1.7 MMOL/L (ref 0.5–2)
DEPRECATED RDW RBC AUTO: 48.4 FL (ref 37–54)
EGFRCR SERPLBLD CKD-EPI 2021: 111.4 ML/MIN/1.73
EOSINOPHIL # BLD AUTO: 0.13 10*3/MM3 (ref 0–0.4)
EOSINOPHIL NFR BLD AUTO: 1.2 % (ref 0.3–6.2)
ERYTHROCYTE [DISTWIDTH] IN BLOOD BY AUTOMATED COUNT: 13.3 % (ref 12.3–15.4)
FLUAV RNA RESP QL NAA+PROBE: NOT DETECTED
FLUBV RNA RESP QL NAA+PROBE: NOT DETECTED
GLOBULIN UR ELPH-MCNC: 3.3 GM/DL
GLUCOSE SERPL-MCNC: 159 MG/DL (ref 65–99)
GLUCOSE UR STRIP-MCNC: NEGATIVE MG/DL
HCT VFR BLD AUTO: 41.8 % (ref 34–46.6)
HGB BLD-MCNC: 12.9 G/DL (ref 12–15.9)
HGB UR QL STRIP.AUTO: ABNORMAL
HYALINE CASTS UR QL AUTO: ABNORMAL /LPF
IMM GRANULOCYTES # BLD AUTO: 0.02 10*3/MM3 (ref 0–0.05)
IMM GRANULOCYTES NFR BLD AUTO: 0.2 % (ref 0–0.5)
KETONES UR QL STRIP: NEGATIVE
LEUKOCYTE ESTERASE UR QL STRIP.AUTO: ABNORMAL
LIPASE SERPL-CCNC: 21 U/L (ref 13–60)
LYMPHOCYTES # BLD AUTO: 1.33 10*3/MM3 (ref 0.7–3.1)
LYMPHOCYTES NFR BLD AUTO: 12.5 % (ref 19.6–45.3)
MCH RBC QN AUTO: 30.6 PG (ref 26.6–33)
MCHC RBC AUTO-ENTMCNC: 30.9 G/DL (ref 31.5–35.7)
MCV RBC AUTO: 99.1 FL (ref 79–97)
MONOCYTES # BLD AUTO: 0.56 10*3/MM3 (ref 0.1–0.9)
MONOCYTES NFR BLD AUTO: 5.3 % (ref 5–12)
MUCOUS THREADS URNS QL MICRO: ABNORMAL /HPF
NEUTROPHILS NFR BLD AUTO: 8.52 10*3/MM3 (ref 1.7–7)
NEUTROPHILS NFR BLD AUTO: 80.1 % (ref 42.7–76)
NITRITE UR QL STRIP: POSITIVE
NRBC BLD AUTO-RTO: 0 /100 WBC (ref 0–0.2)
PH UR STRIP.AUTO: 6 [PH] (ref 5–8)
PLATELET # BLD AUTO: 255 10*3/MM3 (ref 140–450)
PMV BLD AUTO: 11.6 FL (ref 6–12)
POTASSIUM SERPL-SCNC: 4 MMOL/L (ref 3.5–5.2)
PROCALCITONIN SERPL-MCNC: 0.19 NG/ML (ref 0–0.25)
PROT SERPL-MCNC: 7.7 G/DL (ref 6–8.5)
PROT UR QL STRIP: ABNORMAL
RBC # BLD AUTO: 4.22 10*6/MM3 (ref 3.77–5.28)
RBC # UR STRIP: ABNORMAL /HPF
REF LAB TEST METHOD: ABNORMAL
RENAL EPI CELLS #/AREA URNS HPF: ABNORMAL /HPF
RSV RNA NPH QL NAA+NON-PROBE: NOT DETECTED
SARS-COV-2 RNA RESP QL NAA+PROBE: NOT DETECTED
SODIUM SERPL-SCNC: 157 MMOL/L (ref 136–145)
SP GR UR STRIP: 1.02 (ref 1–1.03)
SQUAMOUS #/AREA URNS HPF: ABNORMAL /HPF
UROBILINOGEN UR QL STRIP: ABNORMAL
WBC # UR STRIP: ABNORMAL /HPF
WBC CLUMPS # UR AUTO: ABNORMAL /HPF
WBC NRBC COR # BLD AUTO: 10.63 10*3/MM3 (ref 3.4–10.8)
YEAST URNS QL MICRO: ABNORMAL /HPF

## 2023-11-25 PROCEDURE — 84145 PROCALCITONIN (PCT): CPT | Performed by: STUDENT IN AN ORGANIZED HEALTH CARE EDUCATION/TRAINING PROGRAM

## 2023-11-25 PROCEDURE — 87147 CULTURE TYPE IMMUNOLOGIC: CPT | Performed by: STUDENT IN AN ORGANIZED HEALTH CARE EDUCATION/TRAINING PROGRAM

## 2023-11-25 PROCEDURE — P9612 CATHETERIZE FOR URINE SPEC: HCPCS

## 2023-11-25 PROCEDURE — 87150 DNA/RNA AMPLIFIED PROBE: CPT | Performed by: STUDENT IN AN ORGANIZED HEALTH CARE EDUCATION/TRAINING PROGRAM

## 2023-11-25 PROCEDURE — 25010000002 CEFEPIME PER 500 MG: Performed by: STUDENT IN AN ORGANIZED HEALTH CARE EDUCATION/TRAINING PROGRAM

## 2023-11-25 PROCEDURE — 99285 EMERGENCY DEPT VISIT HI MDM: CPT

## 2023-11-25 PROCEDURE — 81001 URINALYSIS AUTO W/SCOPE: CPT | Performed by: STUDENT IN AN ORGANIZED HEALTH CARE EDUCATION/TRAINING PROGRAM

## 2023-11-25 PROCEDURE — 74177 CT ABD & PELVIS W/CONTRAST: CPT

## 2023-11-25 PROCEDURE — 25810000003 SODIUM CHLORIDE 0.9 % SOLUTION: Performed by: STUDENT IN AN ORGANIZED HEALTH CARE EDUCATION/TRAINING PROGRAM

## 2023-11-25 PROCEDURE — 87040 BLOOD CULTURE FOR BACTERIA: CPT | Performed by: STUDENT IN AN ORGANIZED HEALTH CARE EDUCATION/TRAINING PROGRAM

## 2023-11-25 PROCEDURE — 81025 URINE PREGNANCY TEST: CPT | Performed by: STUDENT IN AN ORGANIZED HEALTH CARE EDUCATION/TRAINING PROGRAM

## 2023-11-25 PROCEDURE — 25510000001 IOPAMIDOL 61 % SOLUTION: Performed by: STUDENT IN AN ORGANIZED HEALTH CARE EDUCATION/TRAINING PROGRAM

## 2023-11-25 PROCEDURE — 36415 COLL VENOUS BLD VENIPUNCTURE: CPT

## 2023-11-25 PROCEDURE — 87186 SC STD MICRODIL/AGAR DIL: CPT | Performed by: STUDENT IN AN ORGANIZED HEALTH CARE EDUCATION/TRAINING PROGRAM

## 2023-11-25 PROCEDURE — 80053 COMPREHEN METABOLIC PANEL: CPT | Performed by: STUDENT IN AN ORGANIZED HEALTH CARE EDUCATION/TRAINING PROGRAM

## 2023-11-25 PROCEDURE — 71045 X-RAY EXAM CHEST 1 VIEW: CPT

## 2023-11-25 PROCEDURE — 83605 ASSAY OF LACTIC ACID: CPT | Performed by: STUDENT IN AN ORGANIZED HEALTH CARE EDUCATION/TRAINING PROGRAM

## 2023-11-25 PROCEDURE — 85025 COMPLETE CBC W/AUTO DIFF WBC: CPT | Performed by: STUDENT IN AN ORGANIZED HEALTH CARE EDUCATION/TRAINING PROGRAM

## 2023-11-25 PROCEDURE — 87086 URINE CULTURE/COLONY COUNT: CPT | Performed by: STUDENT IN AN ORGANIZED HEALTH CARE EDUCATION/TRAINING PROGRAM

## 2023-11-25 PROCEDURE — 87077 CULTURE AEROBIC IDENTIFY: CPT | Performed by: STUDENT IN AN ORGANIZED HEALTH CARE EDUCATION/TRAINING PROGRAM

## 2023-11-25 PROCEDURE — 25010000002 VANCOMYCIN 10 G RECONSTITUTED SOLUTION: Performed by: STUDENT IN AN ORGANIZED HEALTH CARE EDUCATION/TRAINING PROGRAM

## 2023-11-25 PROCEDURE — 0 HYDROMORPHONE 1 MG/ML SOLUTION: Performed by: STUDENT IN AN ORGANIZED HEALTH CARE EDUCATION/TRAINING PROGRAM

## 2023-11-25 PROCEDURE — 25010000002 HYDROMORPHONE PER 4 MG: Performed by: STUDENT IN AN ORGANIZED HEALTH CARE EDUCATION/TRAINING PROGRAM

## 2023-11-25 PROCEDURE — 87637 SARSCOV2&INF A&B&RSV AMP PRB: CPT | Performed by: STUDENT IN AN ORGANIZED HEALTH CARE EDUCATION/TRAINING PROGRAM

## 2023-11-25 PROCEDURE — 83690 ASSAY OF LIPASE: CPT | Performed by: STUDENT IN AN ORGANIZED HEALTH CARE EDUCATION/TRAINING PROGRAM

## 2023-11-25 RX ORDER — ACETAMINOPHEN 650 MG/1
650 SUPPOSITORY RECTAL ONCE
Status: COMPLETED | OUTPATIENT
Start: 2023-11-25 | End: 2023-11-25

## 2023-11-25 RX ORDER — POLYETHYLENE GLYCOL 3350 17 G/17G
0.5 POWDER, FOR SOLUTION ORAL EVERY MORNING
Status: DISCONTINUED | OUTPATIENT
Start: 2023-11-26 | End: 2023-11-26

## 2023-11-25 RX ORDER — HYDROMORPHONE HYDROCHLORIDE 1 MG/ML
0.5 INJECTION, SOLUTION INTRAMUSCULAR; INTRAVENOUS; SUBCUTANEOUS
Status: DISCONTINUED | OUTPATIENT
Start: 2023-11-25 | End: 2023-11-27

## 2023-11-25 RX ORDER — SODIUM CHLORIDE 9 MG/ML
40 INJECTION, SOLUTION INTRAVENOUS AS NEEDED
Status: DISCONTINUED | OUTPATIENT
Start: 2023-11-25 | End: 2023-11-28 | Stop reason: SDUPTHER

## 2023-11-25 RX ORDER — NYSTATIN 100000 [USP'U]/G
1 POWDER TOPICAL 2 TIMES DAILY
Status: DISCONTINUED | OUTPATIENT
Start: 2023-11-25 | End: 2023-11-27

## 2023-11-25 RX ORDER — SIMETHICONE 20 MG/.3ML
20 EMULSION ORAL EVERY 6 HOURS
Status: DISCONTINUED | OUTPATIENT
Start: 2023-11-25 | End: 2023-12-05 | Stop reason: HOSPADM

## 2023-11-25 RX ORDER — METOPROLOL TARTRATE 50 MG/1
50 TABLET, FILM COATED ORAL 2 TIMES DAILY
Status: DISCONTINUED | OUTPATIENT
Start: 2023-11-25 | End: 2023-11-26

## 2023-11-25 RX ORDER — CHLORHEXIDINE GLUCONATE ORAL RINSE 1.2 MG/ML
15 SOLUTION DENTAL 2 TIMES DAILY
Status: DISCONTINUED | OUTPATIENT
Start: 2023-11-25 | End: 2023-12-05 | Stop reason: HOSPADM

## 2023-11-25 RX ORDER — BISACODYL 10 MG
10 SUPPOSITORY, RECTAL RECTAL
Status: DISCONTINUED | OUTPATIENT
Start: 2023-11-25 | End: 2023-12-05 | Stop reason: HOSPADM

## 2023-11-25 RX ORDER — NYSTATIN 100000 [USP'U]/G
1 POWDER TOPICAL 2 TIMES DAILY
Status: ON HOLD | COMMUNITY
End: 2023-11-27

## 2023-11-25 RX ORDER — ONDANSETRON 2 MG/ML
4 INJECTION INTRAMUSCULAR; INTRAVENOUS EVERY 6 HOURS PRN
Status: DISCONTINUED | OUTPATIENT
Start: 2023-11-25 | End: 2023-12-05 | Stop reason: HOSPADM

## 2023-11-25 RX ORDER — PANTOPRAZOLE SODIUM 40 MG/10ML
40 INJECTION, POWDER, LYOPHILIZED, FOR SOLUTION INTRAVENOUS
Status: DISCONTINUED | OUTPATIENT
Start: 2023-11-26 | End: 2023-11-27

## 2023-11-25 RX ORDER — AMOXICILLIN 250 MG
2 CAPSULE ORAL 2 TIMES DAILY
Status: DISCONTINUED | OUTPATIENT
Start: 2023-11-25 | End: 2023-11-27

## 2023-11-25 RX ORDER — IPRATROPIUM BROMIDE AND ALBUTEROL SULFATE 2.5; .5 MG/3ML; MG/3ML
3 SOLUTION RESPIRATORY (INHALATION) EVERY 4 HOURS PRN
Status: DISCONTINUED | OUTPATIENT
Start: 2023-11-25 | End: 2023-12-05 | Stop reason: HOSPADM

## 2023-11-25 RX ORDER — SODIUM CHLORIDE 0.9 % (FLUSH) 0.9 %
10 SYRINGE (ML) INJECTION EVERY 12 HOURS SCHEDULED
Status: DISCONTINUED | OUTPATIENT
Start: 2023-11-25 | End: 2023-11-28 | Stop reason: SDUPTHER

## 2023-11-25 RX ORDER — HYDROXYZINE HYDROCHLORIDE 25 MG/1
25 TABLET, FILM COATED ORAL EVERY 6 HOURS
Status: DISCONTINUED | OUTPATIENT
Start: 2023-11-26 | End: 2023-12-05 | Stop reason: HOSPADM

## 2023-11-25 RX ORDER — HYDROMORPHONE HYDROCHLORIDE 1 MG/ML
0.5 INJECTION, SOLUTION INTRAMUSCULAR; INTRAVENOUS; SUBCUTANEOUS ONCE
Status: COMPLETED | OUTPATIENT
Start: 2023-11-25 | End: 2023-11-25

## 2023-11-25 RX ORDER — MULTIPLE VITAMINS W/ MINERALS TAB 9MG-400MCG
1 TAB ORAL DAILY
Status: DISCONTINUED | OUTPATIENT
Start: 2023-11-26 | End: 2023-12-05 | Stop reason: HOSPADM

## 2023-11-25 RX ORDER — ROSUVASTATIN CALCIUM 5 MG/1
5 TABLET, COATED ORAL DAILY
Status: DISCONTINUED | OUTPATIENT
Start: 2023-11-26 | End: 2023-12-05 | Stop reason: HOSPADM

## 2023-11-25 RX ORDER — ACETAMINOPHEN 500 MG
500 TABLET ORAL EVERY 4 HOURS PRN
Status: DISCONTINUED | OUTPATIENT
Start: 2023-11-25 | End: 2023-12-05 | Stop reason: HOSPADM

## 2023-11-25 RX ORDER — SODIUM CHLORIDE 0.9 % (FLUSH) 0.9 %
10 SYRINGE (ML) INJECTION AS NEEDED
Status: DISCONTINUED | OUTPATIENT
Start: 2023-11-25 | End: 2023-11-28 | Stop reason: SDUPTHER

## 2023-11-25 RX ORDER — HYDROCODONE BITARTRATE AND ACETAMINOPHEN 5; 325 MG/1; MG/1
1 TABLET ORAL EVERY 6 HOURS PRN
Status: ON HOLD | COMMUNITY
End: 2023-11-27

## 2023-11-25 RX ORDER — ZINC OXIDE
1 OINTMENT (GRAM) TOPICAL EVERY 4 HOURS PRN
Status: ON HOLD | COMMUNITY
End: 2023-11-27

## 2023-11-25 RX ORDER — HYDROCODONE BITARTRATE AND ACETAMINOPHEN 5; 325 MG/1; MG/1
1 TABLET ORAL EVERY 6 HOURS PRN
Status: DISCONTINUED | OUTPATIENT
Start: 2023-11-25 | End: 2023-12-05 | Stop reason: HOSPADM

## 2023-11-25 RX ORDER — AMOXICILLIN 250 MG
2 CAPSULE ORAL 2 TIMES DAILY
Status: ON HOLD | COMMUNITY
End: 2023-11-27

## 2023-11-25 RX ORDER — ONDANSETRON HYDROCHLORIDE 4 MG/5ML
4 SOLUTION ORAL EVERY 6 HOURS PRN
Status: DISCONTINUED | OUTPATIENT
Start: 2023-11-25 | End: 2023-12-05 | Stop reason: HOSPADM

## 2023-11-25 RX ORDER — CARBOXYMETHYLCELLULOSE SODIUM AND GLYCERIN 5; 9 MG/ML; MG/ML
1 SOLUTION/ DROPS OPHTHALMIC 2 TIMES DAILY
COMMUNITY

## 2023-11-25 RX ORDER — ZINC OXIDE 20 %
OINTMENT (GRAM) TOPICAL EVERY 4 HOURS PRN
Status: DISCONTINUED | OUTPATIENT
Start: 2023-11-25 | End: 2023-12-05 | Stop reason: HOSPADM

## 2023-11-25 RX ORDER — MULTIPLE VITAMINS W/ MINERALS TAB 9MG-400MCG
1 TAB ORAL DAILY
Status: ON HOLD | COMMUNITY
End: 2023-11-27

## 2023-11-25 RX ORDER — HYDROXYZINE PAMOATE 25 MG/1
25 CAPSULE ORAL EVERY 6 HOURS
Status: DISCONTINUED | OUTPATIENT
Start: 2023-11-25 | End: 2023-11-25

## 2023-11-25 RX ORDER — BACLOFEN 10 MG/1
20 TABLET ORAL EVERY 6 HOURS
Status: DISCONTINUED | OUTPATIENT
Start: 2023-11-25 | End: 2023-12-05 | Stop reason: HOSPADM

## 2023-11-25 RX ADMIN — Medication 10 ML: at 23:02

## 2023-11-25 RX ADMIN — ACETAMINOPHEN 650 MG: 650 SUPPOSITORY RECTAL at 18:38

## 2023-11-25 RX ADMIN — DOCUSATE SODIUM 50 MG AND SENNOSIDES 8.6 MG 2 TABLET: 8.6; 5 TABLET, FILM COATED ORAL at 23:00

## 2023-11-25 RX ADMIN — BACLOFEN 20 MG: 10 TABLET ORAL at 23:00

## 2023-11-25 RX ADMIN — HYDROMORPHONE HYDROCHLORIDE 0.5 MG: 1 INJECTION, SOLUTION INTRAMUSCULAR; INTRAVENOUS; SUBCUTANEOUS at 20:24

## 2023-11-25 RX ADMIN — CEFEPIME 2000 MG: 2 INJECTION, POWDER, FOR SOLUTION INTRAVENOUS at 18:52

## 2023-11-25 RX ADMIN — HYDROXYZINE HYDROCHLORIDE 25 MG: 25 TABLET ORAL at 23:51

## 2023-11-25 RX ADMIN — CHLORHEXIDINE GLUCONATE 15 ML: 1.2 SOLUTION ORAL at 23:34

## 2023-11-25 RX ADMIN — METOPROLOL TARTRATE 50 MG: 50 TABLET ORAL at 23:33

## 2023-11-25 RX ADMIN — IOPAMIDOL 100 ML: 612 INJECTION, SOLUTION INTRAVENOUS at 19:59

## 2023-11-25 RX ADMIN — NYSTATIN 1 APPLICATION: 100000 POWDER TOPICAL at 23:01

## 2023-11-25 RX ADMIN — Medication 20 MG: at 23:02

## 2023-11-25 RX ADMIN — HYDROMORPHONE HYDROCHLORIDE 1 MG: 1 INJECTION, SOLUTION INTRAMUSCULAR; INTRAVENOUS; SUBCUTANEOUS at 21:13

## 2023-11-25 RX ADMIN — SODIUM CHLORIDE, POTASSIUM CHLORIDE, SODIUM LACTATE AND CALCIUM CHLORIDE 1860 ML: 600; 310; 30; 20 INJECTION, SOLUTION INTRAVENOUS at 18:51

## 2023-11-25 RX ADMIN — HYDROCODONE BITARTRATE AND ACETAMINOPHEN 1 TABLET: 5; 325 TABLET ORAL at 23:00

## 2023-11-25 RX ADMIN — VANCOMYCIN HYDROCHLORIDE 1250 MG: 10 INJECTION, POWDER, LYOPHILIZED, FOR SOLUTION INTRAVENOUS at 19:31

## 2023-11-26 ENCOUNTER — APPOINTMENT (OUTPATIENT)
Dept: GENERAL RADIOLOGY | Facility: HOSPITAL | Age: 48
DRG: 698 | End: 2023-11-26
Payer: MEDICARE

## 2023-11-26 LAB
ALBUMIN SERPL-MCNC: 3.8 G/DL (ref 3.5–5.2)
ALBUMIN/GLOB SERPL: 1.3 G/DL
ALP SERPL-CCNC: 58 U/L (ref 39–117)
ALT SERPL W P-5'-P-CCNC: 19 U/L (ref 1–33)
ANION GAP SERPL CALCULATED.3IONS-SCNC: 8 MMOL/L (ref 5–15)
ARTERIAL PATENCY WRIST A: ABNORMAL
AST SERPL-CCNC: 15 U/L (ref 1–32)
ATMOSPHERIC PRESS: 748 MMHG
BACTERIA BLD CULT: ABNORMAL
BASE EXCESS BLDA CALC-SCNC: 2.8 MMOL/L (ref 0–2)
BASOPHILS # BLD AUTO: 0.09 10*3/MM3 (ref 0–0.2)
BASOPHILS NFR BLD AUTO: 0.7 % (ref 0–1.5)
BDY SITE: ABNORMAL
BILIRUB SERPL-MCNC: 1.1 MG/DL (ref 0–1.2)
BODY TEMPERATURE: 37
BOTTLE TYPE: ABNORMAL
BUN SERPL-MCNC: 41 MG/DL (ref 6–20)
BUN/CREAT SERPL: 78.8 (ref 7–25)
CALCIUM SPEC-SCNC: 9.8 MG/DL (ref 8.6–10.5)
CHLORIDE SERPL-SCNC: 115 MMOL/L (ref 98–107)
CO2 SERPL-SCNC: 29 MMOL/L (ref 22–29)
CREAT SERPL-MCNC: 0.52 MG/DL (ref 0.57–1)
DEPRECATED RDW RBC AUTO: 50.4 FL (ref 37–54)
EGFRCR SERPLBLD CKD-EPI 2021: 116.2 ML/MIN/1.73
EOSINOPHIL # BLD AUTO: 0.46 10*3/MM3 (ref 0–0.4)
EOSINOPHIL NFR BLD AUTO: 3.6 % (ref 0.3–6.2)
ERYTHROCYTE [DISTWIDTH] IN BLOOD BY AUTOMATED COUNT: 13.3 % (ref 12.3–15.4)
GLOBULIN UR ELPH-MCNC: 3 GM/DL
GLUCOSE BLDC GLUCOMTR-MCNC: 82 MG/DL (ref 70–130)
GLUCOSE SERPL-MCNC: 101 MG/DL (ref 65–99)
HCO3 BLDA-SCNC: 28.5 MMOL/L (ref 20–26)
HCT VFR BLD AUTO: 39.7 % (ref 34–46.6)
HGB BLD-MCNC: 12 G/DL (ref 12–15.9)
IMM GRANULOCYTES # BLD AUTO: 0.05 10*3/MM3 (ref 0–0.05)
IMM GRANULOCYTES NFR BLD AUTO: 0.4 % (ref 0–0.5)
LYMPHOCYTES # BLD AUTO: 2.21 10*3/MM3 (ref 0.7–3.1)
LYMPHOCYTES NFR BLD AUTO: 17.3 % (ref 19.6–45.3)
Lab: ABNORMAL
MAGNESIUM SERPL-MCNC: 2.5 MG/DL (ref 1.6–2.6)
MCH RBC QN AUTO: 30.5 PG (ref 26.6–33)
MCHC RBC AUTO-ENTMCNC: 30.2 G/DL (ref 31.5–35.7)
MCV RBC AUTO: 101 FL (ref 79–97)
MODALITY: ABNORMAL
MONOCYTES # BLD AUTO: 0.9 10*3/MM3 (ref 0.1–0.9)
MONOCYTES NFR BLD AUTO: 7 % (ref 5–12)
NEUTROPHILS NFR BLD AUTO: 71 % (ref 42.7–76)
NEUTROPHILS NFR BLD AUTO: 9.08 10*3/MM3 (ref 1.7–7)
NOTIFIED BY: ABNORMAL
NRBC BLD AUTO-RTO: 0 /100 WBC (ref 0–0.2)
PCO2 BLDA: 48 MM HG (ref 35–45)
PCO2 TEMP ADJ BLD: 48 MM HG (ref 35–45)
PH BLDA: 7.38 PH UNITS (ref 7.35–7.45)
PH, TEMP CORRECTED: 7.38 PH UNITS (ref 7.35–7.45)
PHOSPHATE SERPL-MCNC: 3 MG/DL (ref 2.5–4.5)
PLATELET # BLD AUTO: 265 10*3/MM3 (ref 140–450)
PMV BLD AUTO: 12.3 FL (ref 6–12)
PO2 BLDA: 49.4 MM HG (ref 83–108)
PO2 TEMP ADJ BLD: 49.4 MM HG (ref 83–108)
POTASSIUM SERPL-SCNC: 4.1 MMOL/L (ref 3.5–5.2)
PROT SERPL-MCNC: 6.8 G/DL (ref 6–8.5)
RBC # BLD AUTO: 3.93 10*6/MM3 (ref 3.77–5.28)
SAO2 % BLDCOA: 84.3 % (ref 94–99)
SODIUM SERPL-SCNC: 152 MMOL/L (ref 136–145)
VENTILATOR MODE: ABNORMAL
WBC NRBC COR # BLD AUTO: 12.79 10*3/MM3 (ref 3.4–10.8)

## 2023-11-26 PROCEDURE — 80053 COMPREHEN METABOLIC PANEL: CPT | Performed by: INTERNAL MEDICINE

## 2023-11-26 PROCEDURE — 25010000002 ENOXAPARIN PER 10 MG: Performed by: FAMILY MEDICINE

## 2023-11-26 PROCEDURE — 83735 ASSAY OF MAGNESIUM: CPT | Performed by: INTERNAL MEDICINE

## 2023-11-26 PROCEDURE — 82803 BLOOD GASES ANY COMBINATION: CPT

## 2023-11-26 PROCEDURE — 25010000002 CEFEPIME PER 500 MG: Performed by: INTERNAL MEDICINE

## 2023-11-26 PROCEDURE — 82948 REAGENT STRIP/BLOOD GLUCOSE: CPT

## 2023-11-26 PROCEDURE — 25010000002 VANCOMYCIN 1 G RECONSTITUTED SOLUTION 1 EACH VIAL: Performed by: INTERNAL MEDICINE

## 2023-11-26 PROCEDURE — C1751 CATH, INF, PER/CENT/MIDLINE: HCPCS

## 2023-11-26 PROCEDURE — 94799 UNLISTED PULMONARY SVC/PX: CPT

## 2023-11-26 PROCEDURE — 25810000003 LACTATED RINGERS PER 1000 ML: Performed by: INTERNAL MEDICINE

## 2023-11-26 PROCEDURE — 74018 RADEX ABDOMEN 1 VIEW: CPT

## 2023-11-26 PROCEDURE — 84100 ASSAY OF PHOSPHORUS: CPT | Performed by: INTERNAL MEDICINE

## 2023-11-26 PROCEDURE — 25810000003 SODIUM CHLORIDE 0.9 % SOLUTION 250 ML FLEX CONT: Performed by: INTERNAL MEDICINE

## 2023-11-26 PROCEDURE — 02HV33Z INSERTION OF INFUSION DEVICE INTO SUPERIOR VENA CAVA, PERCUTANEOUS APPROACH: ICD-10-PCS | Performed by: FAMILY MEDICINE

## 2023-11-26 PROCEDURE — 25010000002 HYDROMORPHONE PER 4 MG: Performed by: INTERNAL MEDICINE

## 2023-11-26 PROCEDURE — 85025 COMPLETE CBC W/AUTO DIFF WBC: CPT | Performed by: INTERNAL MEDICINE

## 2023-11-26 RX ORDER — SODIUM CHLORIDE 0.9 % (FLUSH) 0.9 %
10 SYRINGE (ML) INJECTION EVERY 12 HOURS SCHEDULED
Status: DISCONTINUED | OUTPATIENT
Start: 2023-11-26 | End: 2023-12-05 | Stop reason: HOSPADM

## 2023-11-26 RX ORDER — SODIUM CHLORIDE 0.9 % (FLUSH) 0.9 %
10 SYRINGE (ML) INJECTION AS NEEDED
Status: DISCONTINUED | OUTPATIENT
Start: 2023-11-26 | End: 2023-12-05 | Stop reason: HOSPADM

## 2023-11-26 RX ORDER — SODIUM CHLORIDE 9 MG/ML
40 INJECTION, SOLUTION INTRAVENOUS AS NEEDED
Status: DISCONTINUED | OUTPATIENT
Start: 2023-11-26 | End: 2023-12-05 | Stop reason: HOSPADM

## 2023-11-26 RX ORDER — ENOXAPARIN SODIUM 100 MG/ML
40 INJECTION SUBCUTANEOUS EVERY 24 HOURS
Status: DISCONTINUED | OUTPATIENT
Start: 2023-11-26 | End: 2023-12-05 | Stop reason: HOSPADM

## 2023-11-26 RX ORDER — SODIUM CHLORIDE, SODIUM LACTATE, POTASSIUM CHLORIDE, CALCIUM CHLORIDE 600; 310; 30; 20 MG/100ML; MG/100ML; MG/100ML; MG/100ML
100 INJECTION, SOLUTION INTRAVENOUS CONTINUOUS
Status: DISCONTINUED | OUTPATIENT
Start: 2023-11-26 | End: 2023-11-28

## 2023-11-26 RX ORDER — SODIUM CHLORIDE 0.9 % (FLUSH) 0.9 %
20 SYRINGE (ML) INJECTION AS NEEDED
Status: DISCONTINUED | OUTPATIENT
Start: 2023-11-26 | End: 2023-12-05 | Stop reason: HOSPADM

## 2023-11-26 RX ORDER — POLYETHYLENE GLYCOL 3350 17 G/17G
17 POWDER, FOR SOLUTION ORAL DAILY
Status: DISCONTINUED | OUTPATIENT
Start: 2023-11-26 | End: 2023-12-05 | Stop reason: HOSPADM

## 2023-11-26 RX ORDER — LIDOCAINE HYDROCHLORIDE 10 MG/ML
1 INJECTION, SOLUTION INFILTRATION; PERINEURAL ONCE
Status: COMPLETED | OUTPATIENT
Start: 2023-11-26 | End: 2023-11-26

## 2023-11-26 RX ORDER — NOREPINEPHRINE BITARTRATE 0.03 MG/ML
.02-.3 INJECTION, SOLUTION INTRAVENOUS
Status: DISCONTINUED | OUTPATIENT
Start: 2023-11-26 | End: 2023-11-27

## 2023-11-26 RX ADMIN — NOREPINEPHRINE BITARTRATE 0.02 MCG/KG/MIN: 0.03 INJECTION, SOLUTION INTRAVENOUS at 02:24

## 2023-11-26 RX ADMIN — NOREPINEPHRINE BITARTRATE 0.06 MCG/KG/MIN: 0.03 INJECTION, SOLUTION INTRAVENOUS at 12:16

## 2023-11-26 RX ADMIN — Medication 20 MG: at 17:06

## 2023-11-26 RX ADMIN — SODIUM CHLORIDE, POTASSIUM CHLORIDE, SODIUM LACTATE AND CALCIUM CHLORIDE 100 ML/HR: 600; 310; 30; 20 INJECTION, SOLUTION INTRAVENOUS at 01:33

## 2023-11-26 RX ADMIN — NYSTATIN 1 APPLICATION: 100000 POWDER TOPICAL at 08:48

## 2023-11-26 RX ADMIN — Medication 10 ML: at 22:37

## 2023-11-26 RX ADMIN — ROSUVASTATIN CALCIUM 5 MG: 5 TABLET, FILM COATED ORAL at 08:48

## 2023-11-26 RX ADMIN — Medication 20 MG: at 11:43

## 2023-11-26 RX ADMIN — BISACODYL 10 MG: 10 SUPPOSITORY RECTAL at 11:32

## 2023-11-26 RX ADMIN — HYDROXYZINE HYDROCHLORIDE 25 MG: 25 TABLET ORAL at 05:15

## 2023-11-26 RX ADMIN — HYDROMORPHONE HYDROCHLORIDE 0.5 MG: 1 INJECTION, SOLUTION INTRAMUSCULAR; INTRAVENOUS; SUBCUTANEOUS at 08:45

## 2023-11-26 RX ADMIN — CEFEPIME 2000 MG: 2 INJECTION, POWDER, FOR SOLUTION INTRAVENOUS at 22:34

## 2023-11-26 RX ADMIN — BACLOFEN 20 MG: 10 TABLET ORAL at 05:03

## 2023-11-26 RX ADMIN — Medication 20 MG: at 05:03

## 2023-11-26 RX ADMIN — HYDROXYZINE HYDROCHLORIDE 25 MG: 25 TABLET ORAL at 17:52

## 2023-11-26 RX ADMIN — BACLOFEN 20 MG: 10 TABLET ORAL at 22:45

## 2023-11-26 RX ADMIN — HYDROMORPHONE HYDROCHLORIDE 0.5 MG: 1 INJECTION, SOLUTION INTRAMUSCULAR; INTRAVENOUS; SUBCUTANEOUS at 15:03

## 2023-11-26 RX ADMIN — Medication 10 ML: at 10:31

## 2023-11-26 RX ADMIN — Medication 10 ML: at 08:47

## 2023-11-26 RX ADMIN — CHLORHEXIDINE GLUCONATE 15 ML: 1.2 SOLUTION ORAL at 08:48

## 2023-11-26 RX ADMIN — POLYETHYLENE GLYCOL 3350 17 G: 17 POWDER, FOR SOLUTION ORAL at 11:23

## 2023-11-26 RX ADMIN — BACLOFEN 20 MG: 10 TABLET ORAL at 11:43

## 2023-11-26 RX ADMIN — HYDROCODONE BITARTRATE AND ACETAMINOPHEN 1 TABLET: 5; 325 TABLET ORAL at 22:45

## 2023-11-26 RX ADMIN — Medication 10 ML: at 22:38

## 2023-11-26 RX ADMIN — DOCUSATE SODIUM 50 MG AND SENNOSIDES 8.6 MG 2 TABLET: 8.6; 5 TABLET, FILM COATED ORAL at 08:48

## 2023-11-26 RX ADMIN — SODIUM CHLORIDE, POTASSIUM CHLORIDE, SODIUM LACTATE AND CALCIUM CHLORIDE 100 ML/HR: 600; 310; 30; 20 INJECTION, SOLUTION INTRAVENOUS at 12:16

## 2023-11-26 RX ADMIN — DOCUSATE SODIUM 50 MG AND SENNOSIDES 8.6 MG 2 TABLET: 8.6; 5 TABLET, FILM COATED ORAL at 22:45

## 2023-11-26 RX ADMIN — METOPROLOL TARTRATE 50 MG: 50 TABLET ORAL at 08:48

## 2023-11-26 RX ADMIN — CHLORHEXIDINE GLUCONATE 15 ML: 1.2 SOLUTION ORAL at 22:37

## 2023-11-26 RX ADMIN — BACLOFEN 20 MG: 10 TABLET ORAL at 17:06

## 2023-11-26 RX ADMIN — ENOXAPARIN SODIUM 40 MG: 100 INJECTION SUBCUTANEOUS at 14:08

## 2023-11-26 RX ADMIN — CEFEPIME 2000 MG: 2 INJECTION, POWDER, FOR SOLUTION INTRAVENOUS at 04:32

## 2023-11-26 RX ADMIN — Medication 10 ML: at 11:29

## 2023-11-26 RX ADMIN — VANCOMYCIN HYDROCHLORIDE 1000 MG: 1 INJECTION, POWDER, LYOPHILIZED, FOR SOLUTION INTRAVENOUS at 21:14

## 2023-11-26 RX ADMIN — PANTOPRAZOLE SODIUM 40 MG: 40 INJECTION, POWDER, FOR SOLUTION INTRAVENOUS at 05:03

## 2023-11-26 RX ADMIN — NYSTATIN 1 APPLICATION: 100000 POWDER TOPICAL at 22:37

## 2023-11-26 RX ADMIN — SODIUM CHLORIDE, POTASSIUM CHLORIDE, SODIUM LACTATE AND CALCIUM CHLORIDE 100 ML/HR: 600; 310; 30; 20 INJECTION, SOLUTION INTRAVENOUS at 17:48

## 2023-11-26 RX ADMIN — Medication 10 ML: at 09:56

## 2023-11-26 RX ADMIN — CEFEPIME 2000 MG: 2 INJECTION, POWDER, FOR SOLUTION INTRAVENOUS at 14:06

## 2023-11-26 RX ADMIN — Medication 1 TABLET: at 08:48

## 2023-11-26 RX ADMIN — HYDROXYZINE HYDROCHLORIDE 25 MG: 25 TABLET ORAL at 12:28

## 2023-11-26 RX ADMIN — LIDOCAINE HYDROCHLORIDE ANHYDROUS 1 ML: 10 INJECTION, SOLUTION INFILTRATION at 09:41

## 2023-11-26 RX ADMIN — NOREPINEPHRINE BITARTRATE 0.06 MCG/KG/MIN: 0.03 INJECTION, SOLUTION INTRAVENOUS at 15:28

## 2023-11-26 RX ADMIN — VANCOMYCIN HYDROCHLORIDE 1000 MG: 1 INJECTION, POWDER, LYOPHILIZED, FOR SOLUTION INTRAVENOUS at 10:18

## 2023-11-26 NOTE — NURSING NOTE
Based on initial read by radiologist clinical correlation via ABG was requested d/t difficulty visualizing exact location of catheter (arterial vs venous) d/t patient's positioning. ABG was obtained and verified placement in venous system. Reviewed ABG finding as well as details of placement and patient situation with radiologist. He is ok with location of PICC and it is good for use at this time. ICU updated.

## 2023-11-26 NOTE — PROGRESS NOTES
[facility] Medicine Services  INPATIENT PROGRESS NOTE    Patient Name: Namita Zabala  Date of Admission: 11/25/2023  Today's Date: 11/26/23  Length of Stay: 1  Primary Care Physician: David Lara MD    Subjective   Chief Complaint: Failure to thrive/UTI/sepsis/trach/nephrotomy tube/gastric tube/contracted/anoxic Brain injury    HPI   Tmax 101.9.  T-current 98.8.  Blood pressure on low side, requiring Levophed drip.  Hypernatremia, improving.  Patient is a liter of oxygen through the tracheotomy.  Leukocytosis noted.    Review of Systems   Unable to obtain patient is nonverbal.  History of an anoxic brain injury.  All pertinent negatives and positives are as above. All other systems have been reviewed and are negative unless otherwise stated.     Objective    Temp:  [98.8 °F (37.1 °C)-101.9 °F (38.8 °C)] 98.8 °F (37.1 °C)  Heart Rate:  [] 70  Resp:  [11-25] 16  BP: ()/(47-74) 98/53  Physical Exam  Vitals and nursing note reviewed.   Constitutional:       Comments: Contracted . chronically ill.  Cachectic.  Patient does not follow commands.   HENT:      Head: Normocephalic.   Eyes:      Conjunctiva/sclera: Conjunctivae normal.      Pupils: Pupils are equal, round, and reactive to light.   Cardiovascular:      Rate and Rhythm: Normal rate and regular rhythm.      Heart sounds: Normal heart sounds.   Pulmonary:      Effort: No respiratory distress.      Comments: Diminished breath sound bilateral, clear, trach in place, on 8 L .  Abdominal:      General: Bowel sounds are normal. There is no distension.      Palpations: Abdomen is soft.      Tenderness: There is no abdominal tenderness.      Comments: Left nephrotomy tube.  Gastric tube in place.  Mojica cath in place.   Musculoskeletal:         General: No swelling.      Cervical back: Neck supple.      Comments: Severely contracted all extremities.   Skin:     General: Skin is warm and dry.       "Capillary Refill: Capillary refill takes 2 to 3 seconds.      Findings: No rash.   Neurological:      Motor: Weakness present.      Coordination: Coordination abnormal.      Gait: Gait abnormal.             Results Review:  I have reviewed the labs, radiology results, and diagnostic studies.    Laboratory Data:   Results from last 7 days   Lab Units 11/26/23  0333 11/25/23  1800   WBC 10*3/mm3 12.79* 10.63   HEMOGLOBIN g/dL 12.0 12.9   HEMATOCRIT % 39.7 41.8   PLATELETS 10*3/mm3 265 255        Results from last 7 days   Lab Units 11/26/23  0333 11/25/23  1800   SODIUM mmol/L 152* 157*   POTASSIUM mmol/L 4.1 4.0   CHLORIDE mmol/L 115* 115*   CO2 mmol/L 29.0 31.0*   BUN mg/dL 41* 48*   CREATININE mg/dL 0.52* 0.62   CALCIUM mg/dL 9.8 10.5   BILIRUBIN mg/dL 1.1 0.7   ALK PHOS U/L 58 64   ALT (SGPT) U/L 19 23   AST (SGOT) U/L 15 17   GLUCOSE mg/dL 101* 159*       Culture Data:   No results found for: \"BLOODCX\", \"URINECX\", \"WOUNDCX\", \"MRSACX\", \"RESPCX\", \"STOOLCX\"    Radiology Data:   Imaging Results (Last 24 Hours)       Procedure Component Value Units Date/Time    XR Abdomen KUB [[id number]] Collected: 11/26/23 1007     Updated: 11/26/23 1020    Narrative:      EXAMINATION: XR ABDOMEN KUB- 11/26/2023 10:07 AM CST     HISTORY: post mid thigh PICC placement; A41.9-Sepsis, unspecified  organism; R65.20-Severe sepsis without septic shock.     REPORT: Supine imaging of the abdomen was performed.     COMPARISON: KUB 4/20/2022.     The patient is in a fetal position, a new vascular access catheter is  noted, from a left lower extremity approach, the catheter takes a course  along the left margin of the spine,, this could be within the arterial  system and clinical correlation is recommended, including blood gases  drawn from this catheter if clinically appropriate. A left-sided  nephroureterostomy catheter is present.       Impression:      A new vascular access catheter is seen over the left pelvis,  inserted from the left " lower extremity , the catheter is not clearly  within the venous system. Clinical correlation is recommended, including  blood gases drawn from this catheter if necessary.     REPORT called to the ICU at the time of dictation.     This report was signed and finalized on 11/26/2023 10:17 AM CST by Dr. Florian Laurent MD.       CT Abdomen Pelvis With Contrast [[id number]] Collected: 11/25/23 2018     Updated: 11/25/23 2035    Narrative:      CT ABDOMEN PELVIS W CONTRAST- 11/25/2023 7:43 PM CST     HISTORY: nephrostomy tube, UTI, septic       COMPARISON: 10/8/2023.     DLP: 854 mGy cm. All CT scans are performed using dose optimization  techniques as appropriate to the performed exam and including at least  one of the following: Automated exposure control, adjustment of the mA  and/or kV according to size, and the use of the iterative reconstruction  technique.     TECHNIQUE: Following the intravenous administration of contrast, helical  CT tomographic images of the abdomen and pelvis were acquired. Coronal  reformatted images were also provided for review.     FINDINGS:  Bibasilar atelectasis is present. The base of the heart is unremarkable.  There is no pericardial effusion..     LIVER: No focal liver lesion. The hepatic vasculature is patent.     BILIARY SYSTEM: The gallbladder is unremarkable. No intrahepatic or  extrahepatic ductal dilatation.     PANCREAS: No focal pancreatic lesion.     SPLEEN: Unremarkable.     KIDNEYS AND ADRENALS: The adrenals are unremarkable. Bilateral  nephrolithiasis is again demonstrated with mild to moderate dilatation  of the upper tracts of both kidneys. A stone within the right renal  pelvis measures 1.2 cm in size. As noted previously there is mild  thickening of the urothelium within the renal pelvis bilaterally with  adjacent mild inflammation. The right ureter is dilated in its proximal  segment. The more distal right ureter is decompressed. No discrete  intraureteral calculus  is identified. FINDINGS within the right kidney,  collecting system and ureter I feel are similar to the previous exam of  10/8/2023. A left-sided nephrostomy tube remains in place with the  proximal pigtail curled within the left renal pelvis. An intraureteral  stent is also present on the left with a distal pigtail catheter in the  urinary bladder.. No calcifications are noted over the normal course of  either ureter..     RETROPERITONEUM: No mass, lymphadenopathy or hemorrhage.     GI TRACT: There is moderate constipation including mild fecal impaction  within the rectal vault. No evidence of mechanical obstruction. There is  a periumbilical hernia. There is a loop of small bowel which protrudes  into the hernia but without evidence of incarceration or obstruction.  There is also diastases of the abdominal musculature within the anterior  lower left pelvis with protrusion of the colon and small bowel as noted  previously. Again no evidence of incarceration or associated  obstruction.. The appendix is not clearly identified. A gastrostomy tube  remains well-positioned. No gastric wall thickening or gastric outlet  obstruction..     OTHER: There is no mesenteric mass, lymphadenopathy or fluid collection.  The abdominopelvic vasculature is patent. The osseous structures and  soft tissues demonstrate no worrisome lesions. No free fluid or  localized inflammation is present.     PELVIS: No adnexal mass or free fluid.. A Mojica catheter is in place  within the bladder. The bladder is evacuated but does contain some air..       Impression:      1. A left-sided nephrostomy tube remains in place with the proximal  pigtail projecting over the left renal pelvis. There is also an  intraureteral stent component to this catheter with the distal pigtail  within the evacuated urinary bladder. Bilateral nephrolithiasis is again  demonstrated including a stone within the right renal pelvis. There is  again evidence of urothelial  thickening within the upper tracts of both  kidneys as well as the renal pelves bilaterally with mild perinephric  stranding similar to the previous exam suggesting inflammation. There is  persistent mild to moderate dilatation of the upper tracts of both  kidneys also similar to the previous exam. No right-sided ureteral stone  or stent. A Mojica catheter is in place within the bladder which is  evacuated. There is some air within the bladder likely related to  instrumentation/the patient's catheterization.  2. Moderate constipation including fecal impaction within the rectal  vault. There is a periumbilical hernia containing a loop of bowel as  well as diastases of the abdominal musculature over the lower left  anterior pelvis allowing protrusion of abdominal contents. FINDINGS are  stable from the prior exam.  3. No free fluid is present.  4. Gastrostomy tube is in place.  5. Bibasilar atelectasis..           This report was signed and finalized on 11/25/2023 8:32 PM CST by Dr. Yuniel De Jesus MD.       XR Chest 1 View [[id number]] Collected: 11/25/23 1905     Updated: 11/25/23 1909    Narrative:      EXAMINATION: XR CHEST 1 VW-  11/25/2023 7:05 PM CST     HISTORY: Sepsis. Fever.     FINDINGS: Today's exam is compared to previous study of 10/8/2023. A  tracheostomy tube remains in place. Right basilar subsegmental  atelectasis is present. The lungs are otherwise clear. There is no  effusion or free air present.       Impression:      1.. Tracheostomy tube again projects over the midline.  2. Right basilar atelectasis. Lungs are otherwise clear.     This report was signed and finalized on 11/25/2023 7:06 PM CST by Dr. Yuniel De Jesus MD.               I have reviewed the patient's current medications.     Assessment/Plan   Assessment  Active Hospital Problems    Diagnosis     **Sepsis     Functional quadriplegia     Tracheostomy present     PEG tube malfunction     Severe malnutrition     Acute respiratory  failure with hypoxia     Sepsis secondary to UTI     MVP (mitral valve prolapse)     Myofascial pain syndrome        Treatment Plan  UTI/sepsis .  Chronic Mojica cath.  Cefepime . Vancomycin status post 1.8 L bolus in ER.  IV hydration lactated ringer.  Leukocytosis.  CT scan abdomen pelvic-left-sided nephrostomy tube remains in place with the proximal pigtail projecting over the left renal pelvis- intraureteral stent component to this catheter with the distal pigtail within the evacuated urinary bladder,  Bilateral nephrolithiasis is again demonstrated including a stone within the right renal pelvis- again evidence of urothelial thickening within the upper tracts of both kidneys as well as the renal pelves bilaterally with mild perinephric  stranding similar to the previous exam suggesting inflammation- persistent mild to moderate dilatation of the upper tracts of both kidneys also similar to the previous exam, No right-sided ureteral stone or stent- Mojica catheter is in place within the bladder which is evacuated- some air within the bladder likely related to instrumentation/the patient's catheterization, Moderate constipation including fecal impaction within the rectal vault-periumbilical hernia containing a loop of bowel as  well as diastases of the abdominal musculature over the lower left anterior pelvis allowing protrusion of abdominal contents-  FINDINGS are stable from the prior exam, No free fluid is present, Gastrostomy tube is in place, Bibasilar atelectasis..     Hypotension . Levophed as needed.    Hypernatremia.  Improving.    Chronic trach . DuoNebs as needed.  Aspiration precaution.  Trach care.  Chest x-ray-Tracheostomy tube again projects over the midline, Right basilar atelectasis, Lungs are otherwise clear.  Patient is on 8 liter of oxygen    Chronic nephrectomy tube.  Plan to have nephrectomy tube change January 11.  Due to bilateral kidney stone chronically..    History of chronically  debilitating state secondary to hypoxic injury in the past.  Patient is nonverbal.    Constipation/fecal impaction.  Constipation medication order as needed..  MiraLAX and suppository.    Hypertension/hyperlipidemia .  Hold Lopressor due to hypotension.  Crestor.  Echocardiogram 5/22/2022 done at -    The left ventricle is normal size. The left ventricular systolic   function is hyperdynamic, with EF 60 - 80%. Small intra-cavitary gradient   present due to cavity obliteration.     Right ventricle size is normal. The right ventricular systolic function   is normal.     There is no recent echo study available for direct [id number]   comparison.     Reflux . Protonix.  Mylicon.  Zofran as needed.    Skin rash.  Nystatin powder . Miconazole powder as needed.  Skin care precaution.    Pain.  Baclofen . Norco as needed.  Dilaudid as needed.    Anxiety/depression.  Atarax.    Patient is from [facility].    Nutrition.  NPO.  G-tube in place.  Multivitamins.  Zinc.  Nutrition consult.    Deconditioning.  Fall precaution    No CPR.   consult    Blood cultures pending.  Urine cultures pending.  COVID-19-negative.  Flu screen negative.  RSV negative    Palliative care consult.    DNR . DNI.    Medical Decision Making  Number and Complexity of problems: UTI/sepsis/tracheotomy/nephrostomy tube/gastric tube/constipation/hypotension/sepsis/failure to thrive/contracted/bedbound/anoxic brain injury  Differential Diagnosis: None    Conditions and Status        Condition is unchanged.     [facility] Data  External documents reviewed: H&P.  Cardiac tracing (EKG, telemetry) interpretation: Sinus.  Radiology interpretation: Chest x-ray/CT scan  Labs reviewed: Laboratory  Any tests that were considered but not ordered: Lab in a.m.     Decision rules/scores evaluated (example [id number], Wells, etc): None     Discussed with: Patient and nursing     Care Planning  Shared decision making: Patient and nursing  Code  status and discussions: DNR . DNI    Disposition  Social Determinants of Health that impact treatment or disposition: From nursing home  3 to 6 days.    Electronically signed by Amado Villarreal MD, 11/26/23, 13:27 CST.

## 2023-11-26 NOTE — NURSING NOTE
As additional verification of placement of line (arterial vs. Venous) arterial monitoring was hooked up. No arterial waveform noted on cardiac monitor, verified with 2 other RN's. Pt's BP at this time was 116/76.

## 2023-11-26 NOTE — CASE MANAGEMENT/SOCIAL WORK
Discharge Planning Assessment  [facility]     Patient Name: Namita Zabala  MRN: [id number]  Today's Date: 11/26/2023    Admit Date: 11/25/2023        Discharge Needs Assessment       Row Name 11/26/23 1056       Living Environment    People in Home facility resident    Name(s) of People in Home [facility]    Current Living Arrangements extended care facility    Potentially Unsafe Housing Conditions none    In the past 12 months has the electric, gas, oil, or water company threatened to shut off services in your home? No    Quality of Family Relationships supportive;involved    Able to Return to Prior Arrangements yes       Resource/Environmental Concerns    Transportation Concerns none       Transition Planning    Patient/Family Anticipates Transition to long-term care facility    Patient/Family Anticipated Services at Transition skilled nursing    Transportation Anticipated health plan transportation       Discharge Needs Assessment    Readmission Within the Last 30 Days no previous admission in last 30 days    Current Outpatient/Agency/Support Group skilled nursing facility    Concerns to be Addressed care coordination/care conferences;discharge planning    Anticipated Changes Related to Illness none    Outpatient/Agency/Support Group Needs skilled nursing facility    Discharge Facility/Level of Care Needs nursing facility, skilled    Discharge Coordination/Progress Patient is currently residing at [facility].  It is noted patient's spouse has requested info on other SNF's.  SW will contact patient's spouse when medically stable.                   Discharge Plan    No documentation.                 Continued Care and Services - Admitted Since 11/25/2023    Coordination has not been started for this encounter.          Demographic Summary    No documentation.                  Functional Status    No documentation.                  Psychosocial    No documentation.                  Abuse/Neglect    No  documentation.                  Legal    No documentation.                  Substance Abuse    No documentation.                  Patient Forms    No documentation.                     ALONSO GonzalezW

## 2023-11-26 NOTE — ED NOTES
Nursing report ED to floor  Namita Zabala  48 y.o.  female    HPI:   Chief Complaint   Patient presents with    Blood Infection       Admitting doctor:   Wayne Pimentel DO    Consulting provider(s):  Consults       No orders found from 10/27/2023 to 11/26/2023.             Admitting diagnosis:   The encounter diagnosis was Sepsis with acute organ dysfunction without septic shock, due to unspecified organism, unspecified organ dysfunction type.    Code status:   Current Code Status       Date Active Code Status Order ID Comments User Context       Prior            Allergies:   Coconut, Levaquin [levofloxacin], Nuts, Penicillins, and [place]    Intake and Output  No intake or output data in the 24 hours ending 11/25/23 2123    Weight:       11/25/23 1809   Weight: 62 kg (136 lb 11 oz)       Most recent vitals:   Vitals:    11/25/23 1813 11/25/23 1946 11/25/23 2022 11/25/23 2116   BP: 110/74 125/72 104/67 95/70   Pulse:  115 112 (!) 127   Resp:    25   Temp: (!) 101.9 °F (38.8 °C)   99.8 °F (37.7 °C)   TempSrc: Rectal   Rectal   SpO2:  99% 99% 95%   Weight:       Height:         Oxygen Therapy: .    Active LDAs/IV Access:   Lines, Drains & Airways       Active LDAs       Name Placement date Placement time Site Days    Peripheral IV 10/08/23 Posterior;Right Hand 10/08/23  --  Hand  48    Peripheral IV --  --  --  --    Nephrostomy Left --  present on assessment  --  present on assessment  Left  --    Urethral Catheter 18 Fr. 10/09/23  0830  -- 47    Surgical Airway Shiley Uncuffed  --  --  --  8.5  --                    Labs (abnormal labs have a star):   Labs Reviewed   COMPREHENSIVE METABOLIC PANEL - Abnormal; Notable for the following components:       Result Value    Glucose 159 (*)     BUN 48 (*)     Sodium 157 (*)     Chloride 115 (*)     CO2 31.0 (*)     BUN/Creatinine Ratio 77.4 (*)     All other components within normal limits    Narrative:     GFR Normal >60  Chronic Kidney Disease <60  Kidney  "Failure <15     CBC WITH AUTO DIFFERENTIAL - Abnormal; Notable for the following components:    MCV 99.1 (*)     MCHC 30.9 (*)     Neutrophil % 80.1 (*)     Lymphocyte % 12.5 (*)     Neutrophils, Absolute 8.52 (*)     All other components within normal limits   URINALYSIS W/ CULTURE IF INDICATED - Abnormal; Notable for the following components:    Color, UA Orange (*)     Appearance, UA Turbid (*)     Bilirubin, UA Small (1+) (*)     Blood, UA Large (3+) (*)     Protein, UA >=300 mg/dL (3+) (*)     Leuk Esterase, UA Large (3+) (*)     Nitrite, UA Positive (*)     All other components within normal limits    Narrative:     Dipstick results may be inaccurate due to color interference.   URINALYSIS, MICROSCOPIC ONLY - Abnormal; Notable for the following components:    RBC, UA Too Numerous to Count (*)     WBC, UA Too Numerous to Count (*)     Bacteria, UA 4+ (*)     Mucus, UA Small/1+ (*)     All other components within normal limits   COVID-19/FLUA&B/RSV, NP SWAB IN TRANSPORT MEDIA 1 HR TAT - Normal    Narrative:     Fact sheet for providers: https://www.fda.gov/media/185568/download    Fact sheet for patients: https://www.fda.gov/media/525198/download    Test performed by PCR.   LIPASE - Normal   PROCALCITONIN - Normal    Narrative:     As a Marker for Sepsis (Non-Neonates):    1. <0.5 ng/mL represents a low risk of severe sepsis and/or septic shock.  2. >2 ng/mL represents a high risk of severe sepsis and/or septic shock.    As a Marker for Lower Respiratory Tract Infections that require antibiotic therapy:    PCT on Admission    Antibiotic Therapy       6-12 Hrs later    >0.5                Strongly Recommended  >0.25 - <0.5        Recommended   0.1 - 0.25          Discouraged              Remeasure/reassess PCT  <0.1                Strongly Discouraged     Remeasure/reassess PCT    As 28 day mortality risk marker: \"Change in Procalcitonin Result\" (>80% or <=80%) if Day 0 (or Day 1) and Day 4 values are available. " Refer to http://www.[facility]-pct-calculator.com    Change in PCT <=80%  A decrease of PCT levels below or equal to 80% defines a positive change in PCT test result representing a higher risk for 28-day all-cause mortality of patients diagnosed with severe sepsis for septic shock.    Change in PCT >80%  A decrease of PCT levels of more than 80% defines a negative change in PCT result representing a lower risk for 28-day all-cause mortality of patients diagnosed with severe sepsis or septic shock.      LACTIC ACID, PLASMA - Normal   PREGNANCY, URINE - Normal   COVID PRE-OP / PRE-PROCEDURE SCREENING ORDER (NO ISOLATION)    Narrative:     The following orders were created for panel order COVID PRE-OP / PRE-PROCEDURE SCREENING ORDER (NO ISOLATION) - Swab, Nasopharynx.  Procedure                               Abnormality         Status                     ---------                               -----------         ------                     COVID-19, FLU A/B, RSV P...[[id number]]  Normal              Final result                 Please view results for these tests on the individual orders.   BLOOD CULTURE   BLOOD CULTURE   URINE CULTURE   CBC AND DIFFERENTIAL    Narrative:     The following orders were created for panel order CBC & Differential.  Procedure                               Abnormality         Status                     ---------                               -----------         ------                     CBC Auto Differential[[id number]]        Abnormal            Final result                 Please view results for these tests on the individual orders.       Meds given in ED:   Medications   vancomycin 1250 mg/250 mL 0.9% NS IVPB ([facility]) (0 mg Intravenous Stopped 11/25/23 2123)   cefepime 2 gm IVPB in 100 ml NS ([facility]BP) (0 mg Intravenous Stopped 11/25/23 1931)   sepsis fluid LR bolus 1,860 mL (1,860 mL Intravenous New Bag 11/25/23 1851)   acetaminophen (TYLENOL) suppository 650 mg (650 mg Rectal Given 11/25/23 1838)    HYDROmorphone (DILAUDID) injection 0.5 mg (0.5 mg Intravenous Given 11/25/23 2024)   iopamidol (ISOVUE-300) 61 % injection 100 mL (100 mL Intravenous Given 11/25/23 1959)   HYDROmorphone (DILAUDID) injection 1 mg (1 mg Intravenous Given 11/25/23 2113)     No current facility-administered medications for this encounter.       NIH Stroke Scale:       Isolation/Infection(s):  No active isolations   ESBL Klebsiella, ESBL E coli     COVID Testing  Collected .  Resulted .    Nursing report ED to floor:  Mental status: .  Ambulatory status: .  Precautions: .    ED nurse phone extentsion- ..

## 2023-11-26 NOTE — H&P
[facility] Medicine Services  HISTORY AND PHYSICAL    Date of Admission: 11/25/2023  Primary Care Physician: David Lara MD    Subjective   Primary Historian:  at bedside    Chief Complaint: Abnormal urine    Blood Infection      48-year-old female who is in a chronically debilitated state secondary to a hypoxic injury.  She lives at [facility].  She has a trach, nephrostomy tubes, and a G-tube.  The  notes that the last time her urine bag got cloudy with particle she had a UTI.  He pointed this out to staff.  The patient was subsequently sent to the emergency department because of this.  The patient is making grimacing faces.  She is nonverbal.  The  notes that she does not have any sores on her skin that he is aware of.  The patient is scheduled to have her nephrostomy tubes changed on January 11.    Prior admission documentation:  Date of Admission: 10/8/2023  Date of Discharge:  10/9/2023  Primary Care Physician: David Lara MD     Discharge Diagnoses:       Active Hospital Problems     Diagnosis      **Sepsis secondary to UTI      Infection associated with nephrostomy catheter      Anoxic brain injury      Functional quadriplegia      Tracheostomy present      PEG tube malfunction      Dysphagia      Essential (primary) hypertension        Review of Systems   Genitourinary:         Color change and sediment in urine noted      Otherwise complete ROS reviewed and negative except as mentioned in the HPI.    Past Medical History:   Past Medical History:   Diagnosis Date    Acute kidney failure, unspecified     Angina at rest     Anoxic brain damage, not elsewhere classified     Chronic pulmonary embolism     Chronic respiratory failure, unspecified whether with hypoxia or hypercapnia     Dependence on respirator (ventilator) status     Gastrointestinal hemorrhage, unspecified     Hypercalcemia     Iron deficiency  anemia     Metabolic encephalopathy     Migraine     Sees Pain Management for injections.     MVP (mitral valve prolapse)     Other dysphagia     Other pulmonary embolism with acute cor pulmonale     Renal disorder     Ruptured bladder     Syncope     Urinary tract infection, site not specified      Past Surgical History:  Past Surgical History:   Procedure Laterality Date    ABDOMINAL SURGERY      BREAST BIOPSY Right 2011    benign    GTUBE INSERTION      INSERTION HEMODIALYSIS CATHETER Left 09/16/2021    Procedure: HEMODIALYSIS CATHETER INSERTION;  Surgeon: Anders Kaur MD;  Location: [place] [id number];  Service: Vascular;  Laterality: Left;    TONSILLECTOMY      TRACHEOSTOMY       Social History:  reports that she has never smoked. She has never used smokeless tobacco. She reports that she does not drink alcohol and does not use drugs.    Family History: family history includes Colon cancer (age of onset: 52) in her maternal aunt; Fibromyalgia in her mother; Heart disease in her mother; Hodgkin's lymphoma in her paternal grandmother; Hypertension in her mother.       Allergies:  Allergies   Allergen Reactions    Coconut Unknown - High Severity    Levaquin [Levofloxacin] Other (See Comments)     unknown    Nuts Unknown - High Severity    Penicillins     [place] Other (See Comments)     Causes migraines per pt reports       Medications:  Prior to Admission medications    Medication Sig Start Date End Date Taking? Authorizing Provider   acetaminophen (TYLENOL) 500 MG tablet Administer 1 tablet per G tube Every 4 (Four) Hours As Needed for Fever. Not to exceed 3000mg from all sources daily   Yes Odalys Mullen MD   baclofen (LIORESAL) 20 MG tablet Administer 1 tablet per G tube Every 6 (Six) Hours.   Yes Odalys Mullen MD   bisacodyl (DULCOLAX) 10 MG suppository Insert 1 suppository into the rectum Every Other Day As Needed for Constipation.   Yes Odalys Mullen MD    Carboxymethylcellul-Glycerin (Refresh Optive) 0.5-0.9 % solution Apply 1 drop to eye(s) as directed by provider 2 (Two) Times a Day. Bilateral eyes   Yes Odalys Mullen MD   carboxymethylcellulose (REFRESH PLUS) 0.5 % solution Administer 1 drop to both eyes 2 (Two) Times a Day.   Yes Odalys Mullen MD   chlorhexidine (PERIDEX) 0.12 % solution Apply 15 mL to the mouth or throat 2 (Two) Times a Day. 15 ml using oral swab twice daily for oral care   Yes Odalys Mullen MD   Citric Yh-Fiefbkzrzii-Yl Carb (RENACIDIN IR) Irrigate with 60 mL to the affected area as directed by provider 2 (Two) Times a Day. Irrigate Mojica Catheter with 60 ml BID   Yes Odalys Mullen MD   Eyelid Cleansers (OCUSOFT LID SCRUB EX) Administer 1 Application to both eyes Daily.   Yes Odalys Mullen MD   HYDROcodone-acetaminophen (NORCO) 5-325 MG per tablet Take 1 tablet by mouth Every 6 (Six) Hours As Needed.   Yes Odalys Mullen MD   hydrOXYzine pamoate (VISTARIL) 25 MG capsule Administer 1 capsule per G tube Every 6 (Six) Hours.   Yes Odalys Mullen MD   liver oil-zinc oxide (DESITIN) 40 % ointment Apply 1 application  topically to the appropriate area as directed Every 4 (Four) Hours As Needed for Irritation.   Yes Odalys Mullen MD   metoprolol tartrate (LOPRESSOR) 50 MG tablet Administer 1 tablet per G tube 2 (Two) Times a Day.   Yes Odalys Mullen MD   miconazole (MICOTIN) 2 % powder Apply 1 application  topically to the appropriate area as directed 2 (Two) Times a Day As Needed for Itching (to abd fold 2 x day prn apply to groin and gluteal for rash, itching, and irritation).   Yes Odalys Mullen MD   multivitamin with minerals tablet tablet Take 1 tablet by mouth Daily.   Yes Odalys Mullen MD   nystatin (MYCOSTATIN) 860213 UNIT/GM powder Apply 1 application  topically to the appropriate area as directed 2 (Two) Times a Day. Bilateral bends of arms, left abd  fold, and under left breast   Yes Odalys Mullen MD   ondansetron (ZOFRAN) 4 MG/5ML solution Take 5 mL by mouth Every 6 (Six) Hours As Needed for Nausea or Vomiting.   Yes Odalys Mullen MD   pantoprazole (PROTONIX) 2 mg/mL suspension suspension Administer 20 mL per G tube 2 (Two) Times a Day.   Yes Odalys Mullen MD   polyethylene glycol (MIRALAX) 17 GM/SCOOP powder Administer 17 g per G tube Every Morning.   Yes Odalys Mullen MD   rosuvastatin (CRESTOR) 5 MG tablet Administer 1 tablet per G tube Daily.   Yes Odalys Mullen MD   sennosides-docusate (Senna Plus) 8.6-50 MG per tablet Take 2 tablets by mouth 2 (Two) Times a Day.   Yes Odalys Mullen MD   simethicone (MYLICON) 40 MG/0.6ML drops Administer 0.3 mL per G tube Every 6 (Six) Hours.   Yes Odalys Mullen MD   sodium chloride (NS) 0.9 % irrigation Irrigate with 10 mL to the affected area as directed by provider Every 8 (Eight) Hours. Irrigation for secretions: Hydration   Yes Odalys Mullen MD   Sodium Phosphates (fleet enema) 7-19 GM/118ML enema Insert 1 enema into the rectum Every Other Day As Needed (bowel management).   Yes Odalys Mullen MD   tiZANidine (ZANAFLEX) 4 MG tablet Administer 2 tablets per G tube Every 8 (Eight) Hours.   Yes Odalys Mullen MD   albuterol (PROVENTIL) (2.5 MG/3ML) 0.083% nebulizer solution Take 2.5 mg by nebulization Every 4 (Four) Hours As Needed for Wheezing.    Odalys Mullen MD   Docusate Sodium 150 MG/15ML syrup Take 20 mL by mouth 2 (Two) Times a Day.    Odalys Mullen MD   Enoxaparin Sodium (LOVENOX) 40 MG/0.4ML solution prefilled syringe syringe Inject 0.4 mL under the skin into the appropriate area as directed Every Morning.    Odalys Mullen MD   ferrous sulfate 220 (44 Fe) MG/5ML liquid liquid Administer 5 mL per G tube Every Morning.    Odalys Mullen MD   oxyCODONE (ROXICODONE) 5 MG immediate release tablet Take 1  "tablet by mouth Every 6 (Six) Hours As Needed for Moderate Pain.    Odalys Mullen MD   potassium chloride (MICRO-K) 10 MEQ CR capsule Take 2 capsules by mouth Every Morning. PER G-TUBE    Odalys Mullen MD   saccharomyces boulardii (FLORASTOR) 250 MG capsule Administer 1 capsule per G tube Every Morning.    Odalys Mullen MD   sodium chloride 3 % nebulizer solution Take 4 mL by nebulization Every 6 (Six) Hours As Needed for Cough (cough and sputum).    Odalys Mullen MD   thiamine (VITAMIN B-1) 100 MG tablet  tablet Administer 1 tablet per G tube Every Morning.    Odalys Mullen MD     I have utilized all available immediate resources to obtain, update, or review the patient's current medications (including all prescriptions, over-the-counter products, herbals, cannabis/cannabidiol products, and vitamin/mineral/dietary (nutritional) supplements).    Objective     Vital Signs: BP 95/70   Pulse (!) 127   Temp 99.8 °F (37.7 °C) (Rectal)   Resp 25   Ht 152.4 cm (60\")   Wt 62 kg (136 lb 11 oz)   LMP  (LMP Unknown)   SpO2 95%   BMI 26.69 kg/m²   Physical Exam  Vitals reviewed.   Constitutional:       Comments: Eyes are open.  Patient grimacing.  Patient nonverbal.   HENT:      Head: Normocephalic and atraumatic.      Right Ear: External ear normal.      Left Ear: External ear normal.      Nose: Nose normal.      Mouth/Throat:      Comments: Trach with trach shield in place.  Eyes:      General: No scleral icterus.        Right eye: No discharge.         Left eye: No discharge.      Conjunctiva/sclera: Conjunctivae normal.   Cardiovascular:      Rate and Rhythm: Normal rate and regular rhythm.      Heart sounds: Normal heart sounds.   Pulmonary:      Effort: Pulmonary effort is normal.      Breath sounds: Normal breath sounds.   Abdominal:      General: There is no distension.      Palpations: Abdomen is soft.      Tenderness: There is no abdominal tenderness.      Comments: " G-tube   Genitourinary:     Comments: Nephrostomy tubes  Musculoskeletal:         General: No tenderness.      Cervical back: Normal range of motion and neck supple.   Skin:     General: Skin is warm and dry.   Neurological:      Mental Status: Mental status is at baseline.      Comments: Chronic extremity contractures.          Results Reviewed:  Lab Results (last 24 hours)       Procedure Component Value Units Date/Time    Blood Culture - Blood, Hand, Right [[id number]] Collected: 11/25/23 1850    Specimen: Blood from Hand, Right Updated: 11/25/23 1918    COVID PRE-OP / PRE-PROCEDURE SCREENING ORDER (NO ISOLATION) - Swab, Nasopharynx [[id number]]  (Normal) Collected: 11/25/23 1820    Specimen: Swab from Nasopharynx Updated: 11/25/23 1912    Narrative:      The following orders were created for panel order COVID PRE-OP / PRE-PROCEDURE SCREENING ORDER (NO ISOLATION) - Swab, Nasopharynx.  Procedure                               Abnormality         Status                     ---------                               -----------         ------                     COVID-19, FLU A/B, RSV P...[[id number]]  Normal              Final result                 Please view results for these tests on the individual orders.    COVID-19, FLU A/B, RSV PCR 1 HR TAT - Swab, Nasopharynx [[id number]]  (Normal) Collected: 11/25/23 1820    Specimen: Swab from Nasopharynx Updated: 11/25/23 1912     COVID19 Not Detected     Influenza A PCR Not Detected     Influenza B PCR Not Detected     RSV, PCR Not Detected    Narrative:      Fact sheet for providers: https://www.fda.gov/media/970064/download    Fact sheet for patients: https://www.fda.gov/media/317904/download    Test performed by PCR.    Lactic Acid, Plasma [[id number]]  (Normal) Collected: 11/25/23 1800    Specimen: Blood Updated: 11/25/23 1909     Lactate 1.7 mmol/L     Procalcitonin [[id number]]  (Normal) Collected: 11/25/23 1800    Specimen: Blood Updated: 11/25/23 1859     Procalcitonin  "0.19 ng/mL     Narrative:      As a Marker for Sepsis (Non-Neonates):    1. <0.5 ng/mL represents a low risk of severe sepsis and/or septic shock.  2. >2 ng/mL represents a high risk of severe sepsis and/or septic shock.    As a Marker for Lower Respiratory Tract Infections that require antibiotic therapy:    PCT on Admission    Antibiotic Therapy       6-12 Hrs later    >0.5                Strongly Recommended  >0.25 - <0.5        Recommended   0.1 - 0.25          Discouraged              Remeasure/reassess PCT  <0.1                Strongly Discouraged     Remeasure/reassess PCT    As 28 day mortality risk marker: \"Change in Procalcitonin Result\" (>80% or <=80%) if Day 0 (or Day 1) and Day 4 values are available. Refer to http://www.[employer] Solarmanpct-calculator.com    Change in PCT <=80%  A decrease of PCT levels below or equal to 80% defines a positive change in PCT test result representing a higher risk for 28-day all-cause mortality of patients diagnosed with severe sepsis for septic shock.    Change in PCT >80%  A decrease of PCT levels of more than 80% defines a negative change in PCT result representing a lower risk for 28-day all-cause mortality of patients diagnosed with severe sepsis or septic shock.       Comprehensive Metabolic Panel [[id number]]  (Abnormal) Collected: 11/25/23 1800    Specimen: Blood Updated: 11/25/23 1855     Glucose 159 mg/dL      BUN 48 mg/dL      Creatinine 0.62 mg/dL      Sodium 157 mmol/L      Potassium 4.0 mmol/L      Comment: Slight hemolysis detected by analyzer. Result may be falsely elevated.        Chloride 115 mmol/L      CO2 31.0 mmol/L      Calcium 10.5 mg/dL      Total Protein 7.7 g/dL      Albumin 4.4 g/dL      ALT (SGPT) 23 U/L      AST (SGOT) 17 U/L      Alkaline Phosphatase 64 U/L      Total Bilirubin 0.7 mg/dL      Globulin 3.3 gm/dL      A/G Ratio 1.3 g/dL      BUN/Creatinine Ratio 77.4     Anion Gap 11.0 mmol/L      eGFR 111.4 mL/min/1.73     Narrative:      GFR Normal " >60  Chronic Kidney Disease <60  Kidney Failure <15      Urinalysis With Culture If Indicated - Indwelling Urethral Catheter [[id number]]  (Abnormal) Collected: 11/25/23 1819    Specimen: Urine from Indwelling Urethral Catheter Updated: 11/25/23 1853     Color, UA [place]     Appearance, UA Turbid     pH, UA 6.0     Specific Gravity, UA 1.023     Glucose, UA Negative     Ketones, UA Negative     Bilirubin, UA Small (1+)     Blood, UA Large (3+)     Protein, UA >=300 mg/dL (3+)     Leuk Esterase, UA Large (3+)     Nitrite, UA Positive     Urobilinogen, UA 1.0 E.U./dL    Narrative:      Dipstick results may be inaccurate due to color interference.    Urinalysis, Microscopic Only - Indwelling Urethral Catheter [[id number]]  (Abnormal) Collected: 11/25/23 1819    Specimen: Urine from Indwelling Urethral Catheter Updated: 11/25/23 1853     RBC, UA Too Numerous to Count /HPF      WBC, UA Too Numerous to Count /HPF      Bacteria, UA 4+ /HPF      Squamous Epithelial Cells, UA None Seen /HPF      Renal Epithelial Cells, UA 0-2 /HPF      Yeast, UA Small/1+ Budding Yeast /HPF      Hyaline Casts, UA None Seen /LPF      Calcium Oxalate Crystals, UA Small/1+ /HPF      Mucus, UA Small/1+ /HPF      WBC Clumps, UA Moderate/2+ /HPF      Methodology Manual Light Microscopy    Urine Culture - Urine, Indwelling Urethral Catheter [[id number]] Collected: 11/25/23 1819    Specimen: Urine from Indwelling Urethral Catheter Updated: 11/25/23 1853    Lipase [[id number]]  (Normal) Collected: 11/25/23 1800    Specimen: Blood Updated: 11/25/23 1849     Lipase 21 U/L     Blood Culture - Blood, Hand, Right [[id number]] Collected: 11/25/23 1800    Specimen: Blood from Hand, Right Updated: 11/25/23 1844    Pregnancy, Urine - Urine, Clean Catch [[id number]]  (Normal) Collected: 11/25/23 1820    Specimen: Urine, Clean Catch Updated: 11/25/23 1837     HCG, Urine QL Negative    CBC & Differential [[id number]]  (Abnormal) Collected: 11/25/23 1800    Specimen:  Blood Updated: 11/25/23 1833    Narrative:      The following orders were created for panel order CBC & Differential.  Procedure                               Abnormality         Status                     ---------                               -----------         ------                     CBC Auto Differential[[id number]]        Abnormal            Final result                 Please view results for these tests on the individual orders.    CBC Auto Differential [[id number]]  (Abnormal) Collected: 11/25/23 1800    Specimen: Blood Updated: 11/25/23 1833     WBC 10.63 10*3/mm3      RBC 4.22 10*6/mm3      Hemoglobin 12.9 g/dL      Hematocrit 41.8 %      MCV 99.1 fL      MCH 30.6 pg      MCHC 30.9 g/dL      RDW 13.3 %      RDW-SD 48.4 fl      MPV 11.6 fL      Platelets 255 10*3/mm3      Neutrophil % 80.1 %      Lymphocyte % 12.5 %      Monocyte % 5.3 %      Eosinophil % 1.2 %      Basophil % 0.7 %      Immature Grans % 0.2 %      Neutrophils, Absolute 8.52 10*3/mm3      Lymphocytes, Absolute 1.33 10*3/mm3      Monocytes, Absolute 0.56 10*3/mm3      Eosinophils, Absolute 0.13 10*3/mm3      Basophils, Absolute 0.07 10*3/mm3      Immature Grans, Absolute 0.02 10*3/mm3      nRBC 0.0 /100 WBC           Imaging Results (Last 24 Hours)       Procedure Component Value Units Date/Time    CT Abdomen Pelvis With Contrast [[id number]] Collected: 11/25/23 2018     Updated: 11/25/23 2035    Narrative:      CT ABDOMEN PELVIS W CONTRAST- 11/25/2023 7:43 PM CST     HISTORY: nephrostomy tube, UTI, septic       COMPARISON: 10/8/2023.     DLP: 854 mGy cm. All CT scans are performed using dose optimization  techniques as appropriate to the performed exam and including at least  one of the following: Automated exposure control, adjustment of the mA  and/or kV according to size, and the use of the iterative reconstruction  technique.     TECHNIQUE: Following the intravenous administration of contrast, helical  CT tomographic images of the  abdomen and pelvis were acquired. Coronal  reformatted images were also provided for review.     FINDINGS:  Bibasilar atelectasis is present. The base of the heart is unremarkable.  There is no pericardial effusion..     LIVER: No focal liver lesion. The hepatic vasculature is patent.     BILIARY SYSTEM: The gallbladder is unremarkable. No intrahepatic or  extrahepatic ductal dilatation.     PANCREAS: No focal pancreatic lesion.     SPLEEN: Unremarkable.     KIDNEYS AND ADRENALS: The adrenals are unremarkable. Bilateral  nephrolithiasis is again demonstrated with mild to moderate dilatation  of the upper tracts of both kidneys. A stone within the right renal  pelvis measures 1.2 cm in size. As noted previously there is mild  thickening of the urothelium within the renal pelvis bilaterally with  adjacent mild inflammation. The right ureter is dilated in its proximal  segment. The more distal right ureter is decompressed. No discrete  intraureteral calculus is identified. FINDINGS within the right kidney,  collecting system and ureter I feel are similar to the previous exam of  10/8/2023. A left-sided nephrostomy tube remains in place with the  proximal pigtail curled within the left renal pelvis. An intraureteral  stent is also present on the left with a distal pigtail catheter in the  urinary bladder.. No calcifications are noted over the normal course of  either ureter..     RETROPERITONEUM: No mass, lymphadenopathy or hemorrhage.     GI TRACT: There is moderate constipation including mild fecal impaction  within the rectal vault. No evidence of mechanical obstruction. There is  a periumbilical hernia. There is a loop of small bowel which protrudes  into the hernia but without evidence of incarceration or obstruction.  There is also diastases of the abdominal musculature within the anterior  lower left pelvis with protrusion of the colon and small bowel as noted  previously. Again no evidence of incarceration or  associated  obstruction.. The appendix is not clearly identified. A gastrostomy tube  remains well-positioned. No gastric wall thickening or gastric outlet  obstruction..     OTHER: There is no mesenteric mass, lymphadenopathy or fluid collection.  The abdominopelvic vasculature is patent. The osseous structures and  soft tissues demonstrate no worrisome lesions. No free fluid or  localized inflammation is present.     PELVIS: No adnexal mass or free fluid.. A Mojica catheter is in place  within the bladder. The bladder is evacuated but does contain some air..       Impression:      1. A left-sided nephrostomy tube remains in place with the proximal  pigtail projecting over the left renal pelvis. There is also an  intraureteral stent component to this catheter with the distal pigtail  within the evacuated urinary bladder. Bilateral nephrolithiasis is again  demonstrated including a stone within the right renal pelvis. There is  again evidence of urothelial thickening within the upper tracts of both  kidneys as well as the renal pelves bilaterally with mild perinephric  stranding similar to the previous exam suggesting inflammation. There is  persistent mild to moderate dilatation of the upper tracts of both  kidneys also similar to the previous exam. No right-sided ureteral stone  or stent. A Mojica catheter is in place within the bladder which is  evacuated. There is some air within the bladder likely related to  instrumentation/the patient's catheterization.  2. Moderate constipation including fecal impaction within the rectal  vault. There is a periumbilical hernia containing a loop of bowel as  well as diastases of the abdominal musculature over the lower left  anterior pelvis allowing protrusion of abdominal contents. FINDINGS are  stable from the prior exam.  3. No free fluid is present.  4. Gastrostomy tube is in place.  5. Bibasilar atelectasis..           This report was signed and finalized on 11/25/2023  8:32 PM CST by Dr. Yuniel De Jesus MD.       XR Chest 1 View [[id number]] Collected: 11/25/23 1905     Updated: 11/25/23 1909    Narrative:      EXAMINATION: XR CHEST 1 VW-  11/25/2023 7:05 PM CST     HISTORY: Sepsis. Fever.     FINDINGS: Today's exam is compared to previous study of 10/8/2023. A  tracheostomy tube remains in place. Right basilar subsegmental  atelectasis is present. The lungs are otherwise clear. There is no  effusion or free air present.       Impression:      1.. Tracheostomy tube again projects over the midline.  2. Right basilar atelectasis. Lungs are otherwise clear.     This report was signed and finalized on 11/25/2023 7:06 PM CST by Dr. Yuniel De Jesus MD.             I have personally reviewed and interpreted the radiology studies and ECG obtained at time of admission.     Assessment / Plan   Assessment:   Active Hospital Problems    Diagnosis     **Sepsis      Impression:  UTI  Sepsis  Hyponatremia  Constipation  Anoxic brain injury  Functional quadriplegia  Chronic tracheostomy  Chronic feeding tube    Treatment Plan  Admit to the hospital  Fall, skin, and aspiration precautions  Bowel program  Nutrition consult for restarting the patient's tube feeding and hypernatremia, question if she needs increased free water in her diet.  Tracheostomy care  Ostomy care  Continue vancomycin and cefepime started in the emergency department  Follow-up labs in the morning    The patient will be admitted to my service here at [facility].  Primary team to take over the morning    Medical Decision Making  Number and Complexity of problems: 4, complex  Differential Diagnosis: Dehydration    Conditions and Status        Condition is unchanged.     [facility] Data  External documents reviewed: None  Cardiac tracing (EKG, telemetry) interpretation: None  Radiology interpretation: None  Labs reviewed: Reviewed  Any tests that were considered but not ordered: None     Decision rules/scores evaluated  (example [id number], [place], etc): None     Discussed with:  at bedside     Care Planning  Shared decision making: Family and ED staff  Code status and discussions: DNR per prior documentation    Disposition  Social Determinants of Health that impact treatment or disposition: Anoxic brain injury with mobility restriction  Estimated length of stay is 2 to 3 days.     I confirmed that the patient's advanced care plan is present, code status is documented, and a surrogate decision maker is listed in the patient's medical record.     The patient's surrogate decision maker is .     The patient was seen and examined by me on 11/25/2023 at 9.    Electronically signed by Wayne Pimentel DO, 11/25/23, 21:28 CST.

## 2023-11-26 NOTE — CONSULTS
Patient or patient's representative gives informed consent after an explanation of the risks (air embolism; catheter occlusion; phlebitis; catheter infection; bloodstream infection; vein thrombosis; hematoma/bleeding at the insertion site; slight discomfort; accidental puncture of an artery, nerve, or tendon; dislodging of device), benefits (longer dwell time, outpatient treatment, medications that cannot run peripherally), and alternatives (short peripheral catheter, centrally inserted central line, or permanent catheter) of PICC placement. Time provided to ask and answer questions.    Pt had 5 [place] triple lumen PICC placed in left femoral vein. Pt tolerated procedure well. 38 cm of catheter internal and 0 cm external. Tip confirmation by KUB. All lumens flush and draw well. Sterile dressing applied.

## 2023-11-26 NOTE — PLAN OF CARE
Norco x 1 for pain. Patient got hypotensive throughout the night, levo added. Currently at 0.06. Highest temp 100.1 rectally, most recent temp 98.8 rectally. 10 mL from nephrostomy, 625 mL UOP.     Problem: Skin Injury Risk Increased  Goal: Skin Health and Integrity  Invervention: Optimize Skin Protection  Recent Flowsheet Documentation  Taken 11/26/2023 0600 by Sudeep Sargent RN  Head of Bed ([facility]) Positioning: HOB at 30 degrees  Taken 11/26/2023 0400 by Sudeep Sargent RN  Pressure Reduction Techniques:   heels elevated off bed   pressure points protected   weight shift assistance provided  Head of Bed ([facility]) Positioning: HOB at 30 degrees  Pressure Reduction Devices:   elbow protectors utilized   heel offloading device utilized   positioning supports utilized   pressure-redistributing mattress utilized   specialty bed utilized  Skin Protection:   adhesive use limited   incontinence pads utilized   skin-to-device areas padded   skin-to-skin areas padded   transparent dressing maintained   tubing/devices free from skin contact  Taken 11/26/2023 0200 by Sudeep Sargent RN  Head of Bed ([facility]) Positioning: HOB at 20-30 degrees  Taken 11/26/2023 0000 by Sudeep Sargent RN  Pressure Reduction Techniques:   heels elevated off bed   pressure points protected   weight shift assistance provided  Head of Bed ([facility]) Positioning: HOB at 20-30 degrees  Pressure Reduction Devices:   elbow protectors utilized   heel offloading device utilized   specialty bed utilized   positioning supports utilized   pressure-redistributing mattress utilized  Skin Protection:   adhesive use limited   incontinence pads utilized   skin-to-device areas padded   skin-to-skin areas padded   transparent dressing maintained   tubing/devices free from skin contact  Taken 11/25/2023 2200 by Sudeep Sargent RN  Pressure Reduction Techniques:   heels elevated off bed   pressure points protected   weight shift assistance provided  Head of Bed ([facility]) Positioning:  HOB at 20-30 degrees  Pressure Reduction Devices:   elbow protectors utilized   heel offloading device utilized   positioning supports utilized   pressure-redistributing mattress utilized   specialty bed utilized  Skin Protection:   adhesive use limited   incontinence pads utilized   skin-to-device areas padded   skin-to-skin areas padded   tubing/devices free from skin contact   transparent dressing maintained

## 2023-11-26 NOTE — CONSULTS
Adult Nutrition  Assessment/PES    Patient Name:  Namita Zabala  YOB: 1975  MRN: [id number]  Admit Date:  11/25/2023    Assessment Date:  11/26/2023    NTN TF consult. Initiate continuous feeds of Isosource 1.5 @ 25 mL/hr for the first 12 hrs and increase rate by 10 mL/hr q 8 hrs until goal rate of 45 mL/hr is reached. Water flushes @ 40 mL/hr.    Total Calories: 1485 (102%)  Total Protein: 67.32 (96%)  Total additional free water (TF+Flush): 1632.4 (93%)    Will continue to monitor TF tolerance and pertinent labs.      Reason for Assessment       Row Name 11/26/23 1537          Reason for Assessment    Reason For Assessment identified at risk by screening criteria;physician consult     Diagnosis pulmonary disease;metabolic state     Identified At Risk by Screening Criteria MST SCORE 2+;difficulty chewing/swallowing;tube feeding or parenteral nutrition                      Labs/Tests/Procedures/Meds       Row Name 11/26/23 1537          Labs/Procedures/Meds    Lab Results Reviewed reviewed, pertinent     Lab Results Comments Na 152, Cl 115,, BUN 41, Glu 101        Diagnostic Tests/Procedures    Diagnostic Test/Procedure Reviewed reviewed        Medications    Pertinent Medications Reviewed reviewed     Pertinent Medications Comments see MAR                    Physical Findings       Row Name 11/26/23 1538          Physical Findings    Overall Physical Appearance Trach. UC. Nephrostomy. G-tube. PICC. Last  11/26                    Estimated/Assessed Needs - Anthropometrics       Row Name 11/26/23 1539          Anthropometrics    Weight for Calculation 58.2 kg (128 lb 4.9 oz)        Estimated/Assessed Needs    Additional Documentation KCAL/KG (Group);Fluid Requirements (Group);Protein Requirements (Group)        KCAL/KG    KCAL/KG 25 Kcal/Kg (kcal)     25 Kcal/Kg (kcal) 1455        Protein Requirements    Weight Used For Protein Calculations 58.2 kg (128 lb 4.9 oz)     Est Protein Requirement  Amount (gms/kg) 1.2 gm protein     Estimated Protein Requirements (gms/day) 69.84        Fluid Requirements    Fluid Requirements (mL/day) 1746     Estimated Fluid Requirement Method RDA Method;other (see comments)  30 mL/kg     RDA Method (mL) 1746                    Nutrition Prescription Ordered       Row Name 11/26/23 1540          Nutrition Prescription PO    Current PO Diet NPO                         Problem/Interventions:   Problem 1       Row Name 11/26/23 1542          Nutrition Diagnoses Problem 1    Problem 1 Needs Alternate Route     Etiology (related to) Medical Diagnosis     Infectious Disease UTI;Sepsis     Pulmonary/Critical Care Other (comment)  respiratory insufficiency     Signs/Symptoms (evidenced by) NPO;Other (comment);Report/Observation     Oral Swallowing Difficulty     Other Comment has g-tube                          Intervention Goal       Row Name 11/26/23 1543          Intervention Goal    General Disease management/therapy;Meet nutritional needs for age/condition;Reduce/improve symptoms;Nutrition support treatment     TF/PN Inititiate TF/PN;Establish TF tolerance     Weight Maintain weight                    Nutrition Intervention       Row Name 11/26/23 1543          Nutrition Intervention    RD/Tech Action Care plan reviewd;Recommend/ordered     Recommended/Ordered EN                    Nutrition Prescription       Row Name 11/26/23 1544          Nutrition Prescription EN    Enteral Prescription Enteral begin/change;Enteral to supply     Enteral Route Gastrostomy     Product Isosource 1.5 darryl     TF Delivery Method Continuous     Continuous TF Goal Rate (mL/hr) 45 mL/hr     Continuous TF Starting Rate (mL/hr) 25 mL/hr     Continuous TF Goal Volume (mL) 990 mL     Continuous TF Starting Volume (mL) 550 mL     Water flush (mL)  40 mL     Water Flush Frequency Per hour     New EN Prescription Ordered? Yes        EN to Supply    Kcal/Day 1485 Kcal/Day     Kcal/Kg 25.52 Kcal/Kg      Kcal/Kg Weight Method Actual weight     Protein (gm/day) 67.32 gm/day     Meet Estimated Kcal Need (%) 102 %     Meet Estimated Protein Need (%) 96 %     TF Free H2O (mL) 752.4 mL     Total Free H2O (mL/day) 1632.4 mL/day                    Education/Evaluation       Row Name 11/26/23 1545          Education    Education No discharge needs identified at this time        Monitor/Evaluation    Monitor Per protocol;Pertinent labs;TF delivery/tolerance;Symptoms                     Electronically signed by:  Cal Ferguson RD  11/26/23 15:48 CST

## 2023-11-27 ENCOUNTER — APPOINTMENT (OUTPATIENT)
Dept: GENERAL RADIOLOGY | Facility: HOSPITAL | Age: 48
DRG: 698 | End: 2023-11-27
Payer: MEDICARE

## 2023-11-27 LAB
ALBUMIN SERPL-MCNC: 3.3 G/DL (ref 3.5–5.2)
ALBUMIN/GLOB SERPL: 1.3 G/DL
ALP SERPL-CCNC: 54 U/L (ref 39–117)
ALT SERPL W P-5'-P-CCNC: 14 U/L (ref 1–33)
ANION GAP SERPL CALCULATED.3IONS-SCNC: 11 MMOL/L (ref 5–15)
ARTERIAL PATENCY WRIST A: ABNORMAL
AST SERPL-CCNC: 15 U/L (ref 1–32)
ATMOSPHERIC PRESS: 754 MMHG
BACTERIA SPEC AEROBE CULT: ABNORMAL
BASE EXCESS BLDA CALC-SCNC: 2 MMOL/L (ref 0–2)
BASOPHILS # BLD AUTO: 0.04 10*3/MM3 (ref 0–0.2)
BASOPHILS NFR BLD AUTO: 0.5 % (ref 0–1.5)
BDY SITE: ABNORMAL
BILIRUB SERPL-MCNC: 1 MG/DL (ref 0–1.2)
BODY TEMPERATURE: 37
BUN SERPL-MCNC: 26 MG/DL (ref 6–20)
BUN/CREAT SERPL: 83.9 (ref 7–25)
CALCIUM SPEC-SCNC: 9 MG/DL (ref 8.6–10.5)
CHLORIDE SERPL-SCNC: 112 MMOL/L (ref 98–107)
CO2 SERPL-SCNC: 23 MMOL/L (ref 22–29)
CREAT SERPL-MCNC: 0.31 MG/DL (ref 0.57–1)
DEPRECATED RDW RBC AUTO: 44.4 FL (ref 37–54)
EGFRCR SERPLBLD CKD-EPI 2021: 131.6 ML/MIN/1.73
EOSINOPHIL # BLD AUTO: 0.7 10*3/MM3 (ref 0–0.4)
EOSINOPHIL NFR BLD AUTO: 9.1 % (ref 0.3–6.2)
ERYTHROCYTE [DISTWIDTH] IN BLOOD BY AUTOMATED COUNT: 12.7 % (ref 12.3–15.4)
GLOBULIN UR ELPH-MCNC: 2.6 GM/DL
GLUCOSE BLDC GLUCOMTR-MCNC: 112 MG/DL (ref 70–130)
GLUCOSE BLDC GLUCOMTR-MCNC: 117 MG/DL (ref 70–130)
GLUCOSE BLDC GLUCOMTR-MCNC: 118 MG/DL (ref 70–130)
GLUCOSE BLDC GLUCOMTR-MCNC: 139 MG/DL (ref 70–130)
GLUCOSE SERPL-MCNC: 125 MG/DL (ref 65–99)
GRAM STN SPEC: ABNORMAL
HCO3 BLDA-SCNC: 28.1 MMOL/L (ref 20–26)
HCT VFR BLD AUTO: 32.4 % (ref 34–46.6)
HGB BLD-MCNC: 10.4 G/DL (ref 12–15.9)
IMM GRANULOCYTES # BLD AUTO: 0.03 10*3/MM3 (ref 0–0.05)
IMM GRANULOCYTES NFR BLD AUTO: 0.4 % (ref 0–0.5)
INHALED O2 CONCENTRATION: 100 %
ISOLATED FROM: ABNORMAL
LYMPHOCYTES # BLD AUTO: 1.04 10*3/MM3 (ref 0.7–3.1)
LYMPHOCYTES NFR BLD AUTO: 13.5 % (ref 19.6–45.3)
Lab: ABNORMAL
MCH RBC QN AUTO: 30.9 PG (ref 26.6–33)
MCHC RBC AUTO-ENTMCNC: 32.1 G/DL (ref 31.5–35.7)
MCV RBC AUTO: 96.1 FL (ref 79–97)
MODALITY: ABNORMAL
MONOCYTES # BLD AUTO: 0.44 10*3/MM3 (ref 0.1–0.9)
MONOCYTES NFR BLD AUTO: 5.7 % (ref 5–12)
NEUTROPHILS NFR BLD AUTO: 5.47 10*3/MM3 (ref 1.7–7)
NEUTROPHILS NFR BLD AUTO: 70.8 % (ref 42.7–76)
NRBC BLD AUTO-RTO: 0 /100 WBC (ref 0–0.2)
PCO2 BLDA: 49.5 MM HG (ref 35–45)
PCO2 TEMP ADJ BLD: 49.5 MM HG (ref 35–45)
PEEP RESPIRATORY: 8 CM[H2O]
PH BLDA: 7.36 PH UNITS (ref 7.35–7.45)
PH, TEMP CORRECTED: 7.36 PH UNITS (ref 7.35–7.45)
PLATELET # BLD AUTO: 178 10*3/MM3 (ref 140–450)
PMV BLD AUTO: 11 FL (ref 6–12)
PO2 BLDA: 75.5 MM HG (ref 83–108)
PO2 TEMP ADJ BLD: 75.5 MM HG (ref 83–108)
POTASSIUM SERPL-SCNC: 3.9 MMOL/L (ref 3.5–5.2)
PROT SERPL-MCNC: 5.9 G/DL (ref 6–8.5)
RBC # BLD AUTO: 3.37 10*6/MM3 (ref 3.77–5.28)
SAO2 % BLDCOA: 94.1 % (ref 94–99)
SET MECH RESP RATE: 14
SODIUM SERPL-SCNC: 146 MMOL/L (ref 136–145)
VANCOMYCIN TROUGH SERPL-MCNC: 27.1 MCG/ML (ref 5–20)
VENTILATOR MODE: AC
VT ON VENT VENT: 550 ML
WBC NRBC COR # BLD AUTO: 7.72 10*3/MM3 (ref 3.4–10.8)

## 2023-11-27 PROCEDURE — 99221 1ST HOSP IP/OBS SF/LOW 40: CPT | Performed by: UROLOGY

## 2023-11-27 PROCEDURE — 80202 ASSAY OF VANCOMYCIN: CPT | Performed by: INTERNAL MEDICINE

## 2023-11-27 PROCEDURE — 82948 REAGENT STRIP/BLOOD GLUCOSE: CPT

## 2023-11-27 PROCEDURE — 25010000002 HYDROMORPHONE PER 4 MG: Performed by: INTERNAL MEDICINE

## 2023-11-27 PROCEDURE — 94799 UNLISTED PULMONARY SVC/PX: CPT

## 2023-11-27 PROCEDURE — 25010000002 CEFEPIME PER 500 MG: Performed by: INTERNAL MEDICINE

## 2023-11-27 PROCEDURE — 80053 COMPREHEN METABOLIC PANEL: CPT | Performed by: FAMILY MEDICINE

## 2023-11-27 PROCEDURE — 99223 1ST HOSP IP/OBS HIGH 75: CPT | Performed by: CLINICAL NURSE SPECIALIST

## 2023-11-27 PROCEDURE — 25010000002 ENOXAPARIN PER 10 MG: Performed by: FAMILY MEDICINE

## 2023-11-27 PROCEDURE — 74018 RADEX ABDOMEN 1 VIEW: CPT

## 2023-11-27 PROCEDURE — 85025 COMPLETE CBC W/AUTO DIFF WBC: CPT | Performed by: FAMILY MEDICINE

## 2023-11-27 PROCEDURE — 82803 BLOOD GASES ANY COMBINATION: CPT

## 2023-11-27 RX ORDER — CHLORHEXIDINE GLUCONATE 500 MG/1
1 CLOTH TOPICAL ONCE
Qty: 2 EACH | Refills: 0 | Status: DISCONTINUED | OUTPATIENT
Start: 2023-11-27 | End: 2023-11-28

## 2023-11-27 RX ORDER — BISACODYL 10 MG
10 SUPPOSITORY, RECTAL RECTAL DAILY
Status: DISCONTINUED | OUTPATIENT
Start: 2023-11-27 | End: 2023-12-05 | Stop reason: HOSPADM

## 2023-11-27 RX ORDER — CHLORHEXIDINE GLUCONATE 500 MG/1
1 CLOTH TOPICAL EVERY 24 HOURS
Status: DISCONTINUED | OUTPATIENT
Start: 2023-11-28 | End: 2023-11-28

## 2023-11-27 RX ORDER — SACCHAROMYCES BOULARDII 250 MG
250 CAPSULE ORAL 2 TIMES DAILY
Status: DISCONTINUED | OUTPATIENT
Start: 2023-11-27 | End: 2023-11-29

## 2023-11-27 RX ADMIN — Medication 10 ML: at 21:13

## 2023-11-27 RX ADMIN — CEFEPIME 2000 MG: 2 INJECTION, POWDER, FOR SOLUTION INTRAVENOUS at 04:56

## 2023-11-27 RX ADMIN — Medication 1 APPLICATION: at 11:42

## 2023-11-27 RX ADMIN — Medication 1 TABLET: at 08:58

## 2023-11-27 RX ADMIN — CEFEPIME 2000 MG: 2 INJECTION, POWDER, FOR SOLUTION INTRAVENOUS at 21:12

## 2023-11-27 RX ADMIN — Medication 10 ML: at 09:06

## 2023-11-27 RX ADMIN — ROSUVASTATIN CALCIUM 5 MG: 5 TABLET, FILM COATED ORAL at 08:58

## 2023-11-27 RX ADMIN — PANTOPRAZOLE SODIUM 40 MG: 40 INJECTION, POWDER, FOR SOLUTION INTRAVENOUS at 05:54

## 2023-11-27 RX ADMIN — Medication 20 MG: at 11:45

## 2023-11-27 RX ADMIN — HYDROXYZINE HYDROCHLORIDE 25 MG: 25 TABLET ORAL at 05:54

## 2023-11-27 RX ADMIN — Medication 20 MG: at 22:20

## 2023-11-27 RX ADMIN — HYDROCODONE BITARTRATE AND ACETAMINOPHEN 1 TABLET: 5; 325 TABLET ORAL at 09:05

## 2023-11-27 RX ADMIN — ENOXAPARIN SODIUM 40 MG: 100 INJECTION SUBCUTANEOUS at 14:54

## 2023-11-27 RX ADMIN — HYDROCODONE BITARTRATE AND ACETAMINOPHEN 1 TABLET: 5; 325 TABLET ORAL at 21:20

## 2023-11-27 RX ADMIN — BACLOFEN 20 MG: 10 TABLET ORAL at 17:20

## 2023-11-27 RX ADMIN — CHLORHEXIDINE GLUCONATE 15 ML: 1.2 SOLUTION ORAL at 21:13

## 2023-11-27 RX ADMIN — Medication 20 MG: at 00:00

## 2023-11-27 RX ADMIN — Medication 250 MG: at 08:58

## 2023-11-27 RX ADMIN — BACLOFEN 20 MG: 10 TABLET ORAL at 22:20

## 2023-11-27 RX ADMIN — HYDROXYZINE HYDROCHLORIDE 25 MG: 25 TABLET ORAL at 11:31

## 2023-11-27 RX ADMIN — Medication 250 MG: at 21:13

## 2023-11-27 RX ADMIN — CEFEPIME 2000 MG: 2 INJECTION, POWDER, FOR SOLUTION INTRAVENOUS at 14:54

## 2023-11-27 RX ADMIN — BISACODYL 10 MG: 10 SUPPOSITORY RECTAL at 14:54

## 2023-11-27 RX ADMIN — HYDROMORPHONE HYDROCHLORIDE 0.5 MG: 1 INJECTION, SOLUTION INTRAMUSCULAR; INTRAVENOUS; SUBCUTANEOUS at 00:11

## 2023-11-27 RX ADMIN — BACLOFEN 20 MG: 10 TABLET ORAL at 11:31

## 2023-11-27 RX ADMIN — HYDROXYZINE HYDROCHLORIDE 25 MG: 25 TABLET ORAL at 00:00

## 2023-11-27 RX ADMIN — HYDROXYZINE HYDROCHLORIDE 25 MG: 25 TABLET ORAL at 17:20

## 2023-11-27 RX ADMIN — CHLORHEXIDINE GLUCONATE 15 ML: 1.2 SOLUTION ORAL at 08:59

## 2023-11-27 RX ADMIN — BACLOFEN 20 MG: 10 TABLET ORAL at 05:54

## 2023-11-27 RX ADMIN — Medication 1 APPLICATION: at 21:13

## 2023-11-27 RX ADMIN — Medication 20 MG: at 17:22

## 2023-11-27 RX ADMIN — Medication 20 MG: at 05:54

## 2023-11-27 NOTE — CONSULTS
Urology    Ms. Zabala is 48 y.o. female    REASON FOR CONSULT/CHIEF COMPLAINT: Question about left nephrostomy tube    HPI  48-year-old female with multiple contractures and history of anoxic brain injury managed with indwelling left percutaneous nephroureteral stent and indwelling Mojica catheter due to history of necrotizing infection of large pelvic hematoma that ultimately required bladder repair at [facility] 10/16/21.  It appears that her left nephroureteral stent was most recently exchanged at  and she has had much of her urology care done at [facility] to include previous exploratory laparotomy and bladder repair and has follow-up scheduled at [facility] on 1/11/2024.  Urology consultation was requested due to concern for dislodgment of her percutaneous drain.  Nursing also reported that her indwelling Mojica catheter had not been changed since being changed by Dr. Dyer last month in the ER on 10/9/23.  She is currently in the ICU receiving antibiotics for urinary tract infection.  Urine culture from 2 days ago growing gram-negative rods appears that this was from her old indwelling catheter.  Reviewed her CT abdomen pelvis with contrast on 11/25/2023 showing her left nephroureteral tube in appropriate position with curl in the left renal pelvis and distal curl within the bladder and Mojica catheter in place.  She has known nonobstructing right renal pelvis stone measuring 1.8 cm on my review.    I independently visualized and reviewed the patient's prior imaging studies today in clinic and discussed the imaging findings with the patient.      The following portions of the patient's history were reviewed and updated as appropriate: allergies, current medications, past family history, past medical history, past social history, past surgical history and problem list.    Review of Systems   Unable to perform ROS: Patient nonverbal       Medications Prior to Admission   Medication Sig Dispense Refill Last  Dose    acetaminophen (TYLENOL) 500 MG tablet Administer 1 tablet per G tube Every 4 (Four) Hours As Needed for Fever. Not to exceed 3000mg from all sources daily   11/26/2023    albuterol (PROVENTIL) (2.5 MG/3ML) 0.083% nebulizer solution Take 2.5 mg by nebulization Every 4 (Four) Hours As Needed for Wheezing.   11/26/2023    baclofen (LIORESAL) 20 MG tablet Administer 1 tablet per G tube Every 6 (Six) Hours.   11/26/2023    bisacodyl (DULCOLAX) 10 MG suppository Insert 1 suppository into the rectum Every Other Day As Needed for Constipation.   11/26/2023    Carboxymethylcellul-Glycerin (Refresh Optive) 0.5-0.9 % solution Apply 1 drop to eye(s) as directed by provider 2 (Two) Times a Day. Bilateral eyes   11/26/2023    chlorhexidine (PERIDEX) 0.12 % solution Apply 15 mL to the mouth or throat 2 (Two) Times a Day. 15 ml using oral swab twice daily for oral care   11/26/2023    Citric Hf-Vybbyuezdsn-Rg Carb (RENACIDIN IR) Irrigate with 60 mL to the affected area as directed by provider 2 (Two) Times a Day. Irrigate Mojica Catheter with 60 ml BID   11/26/2023    Docusate Sodium 150 MG/15ML syrup Take 20 mL by mouth 2 (Two) Times a Day.   11/26/2023    Enoxaparin Sodium (LOVENOX) 40 MG/0.4ML solution prefilled syringe syringe Inject 0.4 mL under the skin into the appropriate area as directed Every Morning.   11/26/2023    Eyelid Cleansers (OCUSOFT LID SCRUB EX) Administer 1 Application to both eyes Daily.   11/26/2023    ferrous sulfate 220 (44 Fe) MG/5ML liquid liquid Administer 5 mL per G tube Every Morning.   11/26/2023    hydrOXYzine pamoate (VISTARIL) 25 MG capsule Administer 1 capsule per G tube Every 6 (Six) Hours.   11/26/2023    metoprolol tartrate (LOPRESSOR) 50 MG tablet Administer 1 tablet per G tube 2 (Two) Times a Day.   11/26/2023    ondansetron (ZOFRAN) 4 MG/5ML solution Take 5 mL by mouth Every 6 (Six) Hours As Needed for Nausea or Vomiting.   11/26/2023    oxyCODONE (ROXICODONE) 5 MG immediate release  tablet Take 1 tablet by mouth Every 6 (Six) Hours As Needed for Moderate Pain.   11/26/2023    pantoprazole (PROTONIX) 2 mg/mL suspension suspension Administer 20 mL per G tube 2 (Two) Times a Day.   11/26/2023    polyethylene glycol (MIRALAX) 17 GM/SCOOP powder Administer 17 g per G tube Every Morning.   11/26/2023    potassium chloride (MICRO-K) 10 MEQ CR capsule Take 2 capsules by mouth Every Morning. PER G-TUBE   11/26/2023    rosuvastatin (CRESTOR) 5 MG tablet Administer 1 tablet per G tube Daily.   11/26/2023    saccharomyces boulardii (FLORASTOR) 250 MG capsule Administer 1 capsule per G tube Every Morning.   11/26/2023    simethicone (MYLICON) 40 MG/0.6ML drops Administer 0.3 mL per G tube Every 6 (Six) Hours.   11/26/2023    sodium chloride (NS) 0.9 % irrigation Irrigate with 10 mL to the affected area as directed by provider Every 8 (Eight) Hours. Irrigation for secretions: Hydration   11/26/2023    sodium chloride 3 % nebulizer solution Take 4 mL by nebulization Every 6 (Six) Hours As Needed for Cough (cough and sputum).   11/26/2023    Sodium Phosphates (fleet enema) 7-19 GM/118ML enema Insert 1 enema into the rectum Every Other Day As Needed (bowel management).   11/26/2023    thiamine (VITAMIN B-1) 100 MG tablet  tablet Administer 1 tablet per G tube Every Morning.   11/26/2023    tiZANidine (ZANAFLEX) 4 MG tablet Administer 2 tablets per G tube Every 8 (Eight) Hours.   11/26/2023         Current Facility-Administered Medications:     acetaminophen (TYLENOL) tablet 500 mg, 500 mg, Per G Tube, Q4H PRN, Wayne Pimentel DO    baclofen (LIORESAL) tablet 20 mg, 20 mg, Per G Tube, Q6H, Wayne Pimentel DO, 20 mg at 11/27/23 1131    bisacodyl (DULCOLAX) suppository 10 mg, 10 mg, Rectal, Q48H PRN, Wayne Pimentel DO, 10 mg at 11/26/23 1132    cefepime 2 gm IVPB in 100 ml NS (MBP), 2,000 mg, Intravenous, Q8H, Arsalan Delarosa MD, 2,000 mg at 11/27/23 0516    chlorhexidine (PERIDEX) 0.12 %  solution 15 mL, 15 mL, Mouth/Throat, BID, Wayne Pimentel DO, 15 mL at 11/27/23 0859    Chlorhexidine Gluconate Cloth 2 % pads 1 application , 1 application , Topical, Once, Arsalan Delarosa MD    [START ON 11/28/2023] Chlorhexidine Gluconate Cloth 2 % pads 1 application , 1 application , Topical, Q24H, Arsalan Delarosa MD    Enoxaparin Sodium (LOVENOX) syringe 40 mg, 40 mg, Subcutaneous, Q24H, Amado Villarreal MD, 40 mg at 11/26/23 1408    HYDROcodone-acetaminophen (NORCO) 5-325 MG per tablet 1 tablet, 1 tablet, Oral, Q6H PRN, Wayne Pimentel DO, 1 tablet at 11/27/23 0905    hydrOXYzine (ATARAX) tablet 25 mg, 25 mg, Per G Tube, Q6H, Wayne Pimentel DO, 25 mg at 11/27/23 1131    ipratropium-albuterol (DUO-NEB) nebulizer solution 3 mL, 3 mL, Nebulization, Q4H PRN, Wayne Pimentel DO    lactated ringers infusion, 100 mL/hr, Intravenous, Continuous, Wayne Pimentel DO, Last Rate: 100 mL/hr at 11/26/23 1748, 100 mL/hr at 11/26/23 1748    [START ON 11/28/2023] lansoprazole (PREVACID SOLUTAB) disintegrating tablet Tablet Delayed Release Dispersible 15 mg, 15 mg, Per G Tube, Q AM, Arsalan Delarosa MD    multivitamin with minerals 1 tablet, 1 tablet, Oral, Daily, Wayne Pimentel DO, 1 tablet at 11/27/23 0858    mupirocin (BACTROBAN) 2 % nasal ointment 1 application , 1 application , Each Nare, BID, Arsalan Delarosa MD, 1 application  at 11/27/23 1142    ondansetron (ZOFRAN) 4 MG/5ML oral solution 4 mg, 4 mg, Oral, Q6H PRN, Wayne Pimentel DO    ondansetron (ZOFRAN) injection 4 mg, 4 mg, Intravenous, Q6H PRN, Wayne Pimentel DO    polyethylene glycol (MIRALAX) packet 17 g, 17 g, Oral, Daily, Amado Villarreal MD, 17 g at 11/26/23 1123    rosuvastatin (CRESTOR) tablet 5 mg, 5 mg, Per G Tube, Daily, Wayne Pimentel DO, 5 mg at 11/27/23 0858    saccharomyces boulardii (FLORASTOR) capsule 250 mg, 250 mg, Per G Tube, BID, Arsalan Delarosa MD, 250 mg at 11/27/23 0858     simethicone (MYLICON) 40 MG/0.6ML drops 20 mg, 20 mg, Per G Tube, Q6H, Wayne Pimentel DO, 20 mg at 11/27/23 1145    sodium chloride 0.9 % flush 10 mL, 10 mL, Intravenous, Q12H, Wayne Pimentel DO, 10 mL at 11/27/23 0906    sodium chloride 0.9 % flush 10 mL, 10 mL, Intravenous, PRN, Wayne Pimentel DO    sodium chloride 0.9 % flush 10 mL, 10 mL, Intravenous, Q12H, Amado Villarreal MD, 10 mL at 11/27/23 0906    sodium chloride 0.9 % flush 10 mL, 10 mL, Intravenous, Q12H, Amado Villarreal MD, 10 mL at 11/27/23 0906    sodium chloride 0.9 % flush 10 mL, 10 mL, Intravenous, Q12H, Amado Villarreal MD, 10 mL at 11/27/23 0906    sodium chloride 0.9 % flush 10 mL, 10 mL, Intravenous, PRN, Amado Villarreal MD    sodium chloride 0.9 % flush 20 mL, 20 mL, Intravenous, Phil HARMAN Khai C, MD    sodium chloride 0.9 % infusion 40 mL, 40 mL, Intravenous, PRN, Wayne Pimentel DO    sodium chloride 0.9 % infusion 40 mL, 40 mL, Intravenous, PRPhil ROBERTSON Khai C, MD    zinc oxide 20 % ointment, , Topical, Q4H PRN, Wayne Pimentel DO    Past Medical History:   Diagnosis Date    Acute kidney failure, unspecified     Angina at rest     Anoxic brain damage, not elsewhere classified     Chronic pulmonary embolism     Chronic respiratory failure, unspecified whether with hypoxia or hypercapnia     Dependence on respirator (ventilator) status     Gastrointestinal hemorrhage, unspecified     Hypercalcemia     Iron deficiency anemia     Metabolic encephalopathy     Migraine     Sees Pain Management for injections.     MVP (mitral valve prolapse)     Other dysphagia     Other pulmonary embolism with acute cor pulmonale     Renal disorder     Ruptured bladder     Syncope     Urinary tract infection, site not specified        Past Surgical History:   Procedure Laterality Date    ABDOMINAL SURGERY      BREAST BIOPSY Right 2011    benign    GTUBE INSERTION      INSERTION HEMODIALYSIS CATHETER Left 09/16/2021    Procedure:  "HEMODIALYSIS CATHETER INSERTION;  Surgeon: Anders Kaur MD;  Location:  [facility];  Service: Vascular;  Laterality: Left;    TONSILLECTOMY      TRACHEOSTOMY         Social History     Socioeconomic History    Marital status:    Tobacco Use    Smoking status: Never    Smokeless tobacco: Never   Vaping Use    Vaping Use: Never used   Substance and Sexual Activity    Alcohol use: No    Drug use: No       Family History   Problem Relation Age of Onset    Colon cancer Maternal Aunt 52    Fibromyalgia Mother     Heart disease Mother     Hypertension Mother     Hodgkin's lymphoma Paternal Grandmother     Ovarian cancer Neg Hx     Breast cancer Neg Hx        /58   Pulse 75   Temp 97.6 °F (36.4 °C) (Axillary)   Resp 18   Ht 152.4 cm (60\")   Wt 59.2 kg (130 lb 9.6 oz)   LMP  (LMP Unknown)   SpO2 97%   BMI 25.51 kg/m²     Physical Exam  Lying in bed with multiple contractures.  Trach  Mojica catheter draining clear urine  Left percutaneous nephrostomy tube appears to be in adequate position with clear urine in bag  Abdomen soft with known hernia present    Lab Results   Component Value Date    GLUCOSE 125 (H) 11/27/2023    BUN 26 (H) 11/27/2023    CREATININE 0.31 (L) 11/27/2023    EGFRIFNONA >60 05/31/2022    EGFRIFAFRI >60 05/31/2022    BCR 83.9 (H) 11/27/2023    CO2 23.0 11/27/2023    CALCIUM 9.0 11/27/2023    ALBUMIN 3.3 (L) 11/27/2023    AST 15 11/27/2023    ALT 14 11/27/2023     Lab Results   Component Value Date    GLUCOSE 125 (H) 11/27/2023    CALCIUM 9.0 11/27/2023     (H) 11/27/2023    K 3.9 11/27/2023    CO2 23.0 11/27/2023     (H) 11/27/2023    BUN 26 (H) 11/27/2023    CREATININE 0.31 (L) 11/27/2023    EGFRIFAFRI >60 05/31/2022    EGFRIFNONA >60 05/31/2022    BCR 83.9 (H) 11/27/2023    ANIONGAP 11.0 11/27/2023     Lab Results   Component Value Date    WBC 7.72 11/27/2023    HGB 10.4 (L) 11/27/2023    HCT 32.4 (L) 11/27/2023    MCV 96.1 11/27/2023     " "11/27/2023     No results found for: \"PSA\"  Urine Culture   Date Value Ref Range Status   11/25/2023 >100,000 CFU/mL Gram Negative Bacilli (A)  Preliminary     Brief Urine Lab Results  (Last result in the past 365 days)        Color   Clarity   Blood   Leuk Est   Nitrite   Protein   CREAT   Urine HCG        11/25/23 1820               Negative               Imaging Results (Last 7 Days)       Procedure Component Value Units Date/Time    XR Abdomen KUB [[id number]] Collected: 11/27/23 1121     Updated: 11/27/23 1133    Narrative:      EXAM: XR ABDOMEN KUB-      DATE: 11/27/2023 10:29 AM CST     HISTORY: constipation; A41.9-Sepsis, unspecified organism; R65.20-Severe  sepsis without septic shock       COMPARISON: 11/26/2023, 11/25/2023.     TECHNIQUE:  Supine view of the abdomen. 2 images.     FINDINGS:    Limited evaluation for free air on supine imaging. Nonobstructive supine  bowel gas pattern. Moderate amount of colonic gas and stool. PEG tube.     There is a 1.6 cm calcification projecting at the RIGHT renal pelvis,  similar compared to 11/25/2023 CT.     LEFT percutaneous nephrostomy tube with ureteral stent appears in  similar position.     Mojica catheter.     Interval placement of LEFT vascular catheter, tip projecting at the  level of L4.     Degenerative changes of the spine and pelvis. Sclerotic focus in the  LEFT greater trochanter appears similar to 9/14/2021, not optimally  evaluated on this exam.          Impression:      1. Interval placement LEFT femoral vascular catheter, tip projecting at  the level of L4. This appears to project LEFT of midline, which could be  seen with arterial placement, but evaluation is limited due to rotation.  Recommend clinical correlation.  2. LEFT percutaneous nephrostomy tube with ureteral stent appears in  similar position compared to prior.  3. Similar 1.6 cm RIGHT renal pelvis calcification.  4. Moderate amount of clonic stool.     This report was signed and finalized " on 11/27/2023 11:30 AM CST by Dr Dea Barksdale MD.       XR Abdomen KUB [[id number]] Collected: 11/26/23 1007     Updated: 11/26/23 1020    Narrative:      EXAMINATION: XR ABDOMEN KUB- 11/26/2023 10:07 AM CST     HISTORY: post mid thigh PICC placement; A41.9-Sepsis, unspecified  organism; R65.20-Severe sepsis without septic shock.     REPORT: Supine imaging of the abdomen was performed.     COMPARISON: KUB 4/20/2022.     The patient is in a fetal position, a new vascular access catheter is  noted, from a left lower extremity approach, the catheter takes a course  along the left margin of the spine,, this could be within the arterial  system and clinical correlation is recommended, including blood gases  drawn from this catheter if clinically appropriate. A left-sided  nephroureterostomy catheter is present.       Impression:      A new vascular access catheter is seen over the left pelvis,  inserted from the left lower extremity , the catheter is not clearly  within the venous system. Clinical correlation is recommended, including  blood gases drawn from this catheter if necessary.     REPORT called to the ICU at the time of dictation.     This report was signed and finalized on 11/26/2023 10:17 AM CST by Dr. Florian Laurent MD.       CT Abdomen Pelvis With Contrast [[id number]] Collected: 11/25/23 2018     Updated: 11/25/23 2035    Narrative:      CT ABDOMEN PELVIS W CONTRAST- 11/25/2023 7:43 PM CST     HISTORY: nephrostomy tube, UTI, septic       COMPARISON: 10/8/2023.     DLP: 854 mGy cm. All CT scans are performed using dose optimization  techniques as appropriate to the performed exam and including at least  one of the following: Automated exposure control, adjustment of the mA  and/or kV according to size, and the use of the iterative reconstruction  technique.     TECHNIQUE: Following the intravenous administration of contrast, helical  CT tomographic images of the abdomen and pelvis were acquired.  Coronal  reformatted images were also provided for review.     FINDINGS:  Bibasilar atelectasis is present. The base of the heart is unremarkable.  There is no pericardial effusion..     LIVER: No focal liver lesion. The hepatic vasculature is patent.     BILIARY SYSTEM: The gallbladder is unremarkable. No intrahepatic or  extrahepatic ductal dilatation.     PANCREAS: No focal pancreatic lesion.     SPLEEN: Unremarkable.     KIDNEYS AND ADRENALS: The adrenals are unremarkable. Bilateral  nephrolithiasis is again demonstrated with mild to moderate dilatation  of the upper tracts of both kidneys. A stone within the right renal  pelvis measures 1.2 cm in size. As noted previously there is mild  thickening of the urothelium within the renal pelvis bilaterally with  adjacent mild inflammation. The right ureter is dilated in its proximal  segment. The more distal right ureter is decompressed. No discrete  intraureteral calculus is identified. FINDINGS within the right kidney,  collecting system and ureter I feel are similar to the previous exam of  10/8/2023. A left-sided nephrostomy tube remains in place with the  proximal pigtail curled within the left renal pelvis. An intraureteral  stent is also present on the left with a distal pigtail catheter in the  urinary bladder.. No calcifications are noted over the normal course of  either ureter..     RETROPERITONEUM: No mass, lymphadenopathy or hemorrhage.     GI TRACT: There is moderate constipation including mild fecal impaction  within the rectal vault. No evidence of mechanical obstruction. There is  a periumbilical hernia. There is a loop of small bowel which protrudes  into the hernia but without evidence of incarceration or obstruction.  There is also diastases of the abdominal musculature within the anterior  lower left pelvis with protrusion of the colon and small bowel as noted  previously. Again no evidence of incarceration or associated  obstruction.. The  appendix is not clearly identified. A gastrostomy tube  remains well-positioned. No gastric wall thickening or gastric outlet  obstruction..     OTHER: There is no mesenteric mass, lymphadenopathy or fluid collection.  The abdominopelvic vasculature is patent. The osseous structures and  soft tissues demonstrate no worrisome lesions. No free fluid or  localized inflammation is present.     PELVIS: No adnexal mass or free fluid.. A Mojica catheter is in place  within the bladder. The bladder is evacuated but does contain some air..       Impression:      1. A left-sided nephrostomy tube remains in place with the proximal  pigtail projecting over the left renal pelvis. There is also an  intraureteral stent component to this catheter with the distal pigtail  within the evacuated urinary bladder. Bilateral nephrolithiasis is again  demonstrated including a stone within the right renal pelvis. There is  again evidence of urothelial thickening within the upper tracts of both  kidneys as well as the renal pelves bilaterally with mild perinephric  stranding similar to the previous exam suggesting inflammation. There is  persistent mild to moderate dilatation of the upper tracts of both  kidneys also similar to the previous exam. No right-sided ureteral stone  or stent. A Mojica catheter is in place within the bladder which is  evacuated. There is some air within the bladder likely related to  instrumentation/the patient's catheterization.  2. Moderate constipation including fecal impaction within the rectal  vault. There is a periumbilical hernia containing a loop of bowel as  well as diastases of the abdominal musculature over the lower left  anterior pelvis allowing protrusion of abdominal contents. FINDINGS are  stable from the prior exam.  3. No free fluid is present.  4. Gastrostomy tube is in place.  5. Bibasilar atelectasis..           This report was signed and finalized on 11/25/2023 8:32 PM CST by Dr. Brice  MD Neal.       XR Chest 1 View [[id number]] Collected: 11/25/23 1905     Updated: 11/25/23 1909    Narrative:      EXAMINATION: XR CHEST 1 VW-  11/25/2023 7:05 PM CST     HISTORY: Sepsis. Fever.     FINDINGS: Today's exam is compared to previous study of 10/8/2023. A  tracheostomy tube remains in place. Right basilar subsegmental  atelectasis is present. The lungs are otherwise clear. There is no  effusion or free air present.       Impression:      1.. Tracheostomy tube again projects over the midline.  2. Right basilar atelectasis. Lungs are otherwise clear.     This report was signed and finalized on 11/25/2023 7:06 PM CST by Dr. Yuniel De Jesus MD.                 Assessment and Plan  Chronic left nephroureteral stent appears to be in appropriate position on both exam and imaging.  I requested nursing to change her Mojica catheter and this was exchanged for a new Mojica today.  Instructed them to send a urine sample from the new catheter for urine culture as previous culture was taken from old catheter.  Patient needs to continue  tertiary care facility urology management with interventional radiology capability regarding her indwelling left percutaneous nephroureteral stent.  Her Mojica bladder catheter should be changed by nursing  every 4 weeks.  Urology will sign off, as any/all Urology care needs to be at a tertiary care facility.       (Please note that portions of this note were completed with a voice recognition program.)  Tobi Bailon MD  11/27/23  12:47 CST

## 2023-11-27 NOTE — PLAN OF CARE
BM x 2. Norco x 1 and dilaudid x 1 for pain. Levo has been off since 2334 last night. Tube feeds tolerated well, increased to 35. Will need increased to 45 at 0800 for goal. 775 mL urine output throughout shift.     Problem: Skin Injury Risk Increased  Goal: Skin Health and Integrity  Intervention: Optimize Skin Protection  Recent Flowsheet Documentation  Taken 11/27/2023 0400 by Sudeep Sargent RN  Pressure Reduction Techniques:   heels elevated off bed   pressure points protected   weight shift assistance provided  Head of Bed (hospitals) Positioning: HOB at 30 degrees  Pressure Reduction Devices:   elbow protectors utilized   heel offloading device utilized   positioning supports utilized   pressure-redistributing mattress utilized   specialty bed utilized  Skin Protection:   adhesive use limited   incontinence pads utilized   skin-to-device areas padded   skin-to-skin areas padded   transparent dressing maintained   tubing/devices free from skin contact  Taken 11/27/2023 0200 by Sudeep Sargent RN  Head of Bed (hospitals) Positioning: HOB at 30 degrees  Taken 11/27/2023 0000 by Sudeep Sargent RN  Pressure Reduction Techniques:   heels elevated off bed   pressure points protected   weight shift assistance provided  Head of Bed (hospitals) Positioning: HOB at 30 degrees  Pressure Reduction Devices:   elbow protectors utilized   heel offloading device utilized   positioning supports utilized   pressure-redistributing mattress utilized   specialty bed utilized  Skin Protection:   adhesive use limited   incontinence pads utilized   skin-to-device areas padded   skin-to-skin areas padded   transparent dressing maintained   tubing/devices free from skin contact  Taken 11/26/2023 2200 by Sudeep Sargent RN  Head of Bed (hospitals) Positioning: HOB at 30 degrees  Taken 11/26/2023 2000 by Sudeep Sargent RN  Pressure Reduction Techniques:   heels elevated off bed   pressure points protected   weight shift assistance provided  Head of Bed (hospitals)  Positioning: HOB at 30 degrees  Pressure Reduction Devices:   elbow protectors utilized   heel offloading device utilized   positioning supports utilized   pressure-redistributing mattress utilized   specialty bed utilized  Skin Protection:   adhesive use limited   incontinence pads utilized   skin-to-device areas padded   skin-to-skin areas padded   transparent dressing maintained   tubing/devices free from skin contact

## 2023-11-27 NOTE — CASE MANAGEMENT/SOCIAL WORK
Continued Stay Note  [facility]     Patient Name: Namita Zabala  MRN: [id number]  Today's Date: 11/27/2023    Admit Date: 11/25/2023    Plan: [facility]   Discharge Plan       Row Name 11/27/23 1051       Plan    Plan [facility]    Plan Comments Call placed to patient's spouse.  Received voicemail, left message requesting return call.                   Discharge Codes    No documentation.                       CAMILA Gonzalez

## 2023-11-27 NOTE — PLAN OF CARE
Goal Outcome Evaluation:                      Tube feed started at 25 ml/hr, 0 ml residual noted from PEG tube. IVF, IV ABX, and Levo infusing. Patient turned. Glucose 82. No distress noted.

## 2023-11-27 NOTE — PROGRESS NOTES
[facility] Medicine Services  INPATIENT PROGRESS NOTE    Patient Name: Namita Zabala  Date of Admission: 11/25/2023  Today's Date: 11/27/23  Length of Stay: 2  Primary Care Physician: David Lara MD    Subjective   Chief Complaint: follow-up sepsis  HPI   Patient's eyes are open and she appears alert.  I cannot get her to follow any commands.  Case discussed with bedside nurse, Araceli.  Patient has been off of pressors since last evening.  She has been started on tube feeds for nutrition in addition to free water replacement.  No acute events from overnight.  She now has a PICC placed in her left thigh region, and continues to have a peripheral IV in her left upper extremity.  Remains on 8 L via trach collar; it sounds like that she is normally not on supplemental oxygen as an outpatient.  Mojica catheter has not been changed yet; per my discussion with nursing it sounds like during previous Mojica catheter changes urology assistance was required and Mojica catheter change was not attempted over the weekend.    Review of Systems   All pertinent negatives and positives are as above. All other systems have been reviewed and are negative unless otherwise stated.     Objective    Temp:  [98 °F (36.7 °C)-99.2 °F (37.3 °C)] 98.2 °F (36.8 °C)  Heart Rate:  [] 75  Resp:  [12-18] 18  BP: ()/(50-95) 104/58  Physical Exam  Vitals reviewed.   Constitutional:       Appearance: She is not toxic-appearing.   HENT:      Head: Normocephalic.      Mouth/Throat:      Mouth: Mucous membranes are moist.   Eyes:      Pupils: Pupils are equal, round, and reactive to light.   Neck:      Comments: Trach in place  Cardiovascular:      Rate and Rhythm: Normal rate.   Pulmonary:      Effort: Pulmonary effort is normal. No respiratory distress.      Comments: Diminished at bases  Genitourinary:     Comments: Mojica and left nephrostomy tube in place  Musculoskeletal:      Comments:  Significant contractures notes; left thigh PICC in place   Skin:     General: Skin is warm.   Neurological:      Mental Status: She is alert. Mental status is at baseline.      Motor: Weakness present.         Results Review:  I have reviewed the labs, radiology results, and diagnostic studies.    Laboratory Data:   Results from last 7 days   Lab Units 11/26/23  0333 11/25/23  1800   WBC 10*3/mm3 12.79* 10.63   HEMOGLOBIN g/dL 12.0 12.9   HEMATOCRIT % 39.7 41.8   PLATELETS 10*3/mm3 265 255        Results from last 7 days   Lab Units 11/26/23  0333 11/25/23  1800   SODIUM mmol/L 152* 157*   POTASSIUM mmol/L 4.1 4.0   CHLORIDE mmol/L 115* 115*   CO2 mmol/L 29.0 31.0*   BUN mg/dL 41* 48*   CREATININE mg/dL 0.52* 0.62   CALCIUM mg/dL 9.8 10.5   BILIRUBIN mg/dL 1.1 0.7   ALK PHOS U/L 58 64   ALT (SGPT) U/L 19 23   AST (SGOT) U/L 15 17   GLUCOSE mg/dL 101* 159*       Culture Data:   Blood Culture   Date Value Ref Range Status   11/25/2023 No growth at 24 hours  Preliminary   11/25/2023 Staphylococcus, coagulase negative (C)  Final       Radiology Data:   Imaging Results (Last 24 Hours)       Procedure Component Value Units Date/Time    XR Abdomen KUB [[id number]] Collected: 11/26/23 1007     Updated: 11/26/23 1020    Narrative:      EXAMINATION: XR ABDOMEN KUB- 11/26/2023 10:07 AM CST     HISTORY: post mid thigh PICC placement; A41.9-Sepsis, unspecified  organism; R65.20-Severe sepsis without septic shock.     REPORT: Supine imaging of the abdomen was performed.     COMPARISON: KUB 4/20/2022.     The patient is in a fetal position, a new vascular access catheter is  noted, from a left lower extremity approach, the catheter takes a course  along the left margin of the spine,, this could be within the arterial  system and clinical correlation is recommended, including blood gases  drawn from this catheter if clinically appropriate. A left-sided  nephroureterostomy catheter is present.       Impression:      A new vascular  access catheter is seen over the left pelvis,  inserted from the left lower extremity , the catheter is not clearly  within the venous system. Clinical correlation is recommended, including  blood gases drawn from this catheter if necessary.     REPORT called to the ICU at the time of dictation.     This report was signed and finalized on 11/26/2023 10:17 AM CST by Dr. Florian Laurent MD.               I have reviewed the patient's current medications.     Assessment/Plan   Assessment  Active Hospital Problems    Diagnosis     **Sepsis     Functional quadriplegia     Tracheostomy present     PEG tube malfunction     Severe malnutrition     Acute respiratory failure with hypoxia     Sepsis secondary to UTI     MVP (mitral valve prolapse)     Myofascial pain syndrome        Treatment Plan  IV Cefepime - day 3.  Patient appears to be making improvement on cefepime but will make him mindful the patient has had infection with ESBL organism in the past.  Follow-up urine culture - currently no growth  1/4 bottle on blood cultures positive for coagulase-negative staph-likely contaminant; patient has also been on vancomycin, also day 3 today.  Likely will de-escalate and monitor off of vancomycin starting tomorrow, pending ongoing infectious workup.  Currently on trach collar at 8 L; wean as tolerated  CXR on admission with no acute infiltrates  CBC and CMP still pending for this morning.  Would like to follow-up sodium trend (was at 152 yesterday) and correction of free water deficit.  BUN was 41 yesterday with a creatinine of 0.52.  Patient has been on IV fluids with lactated Ringer's  Now off of Levophed; maintaining MAPs 65 or greater  TFs for nutrition with free water replacement  Patient will need Mojica catheter change; based on conversation with nursing staff it sounds like there have been difficulties with previous Mojica catheter change, even requiring urology assistance per report and will need to investigate this  further.  Lovenox PPx  Pending the above hopefully we can transition to the medical floor in the near future.  Plan to repeat KUB today (eval constipation); currently on bowel regimen    Medical Decision Making  Number and Complexity of problems: High complexity; patient presented with sepsis likely secondary to urinary tract infection, previous history of ESBL infection, dehydration and intravascular volume depletion with evidence of hypernatremia, in addition to hypotension requiring vasopressors with Levophed for blood pressure support.  Multiple chronic comorbidities including bedbound state, reported history of anoxic brain injury (details unknown), chronic tracheostomy and PEG tube placement; also with chronic left nephrostomy tube      Conditions and Status        Condition is improving.     MDM Data  External documents reviewed: none  Cardiac tracing (EKG, telemetry) interpretation: sinus rhythm in the 80s on telemetry monitor this morning  Radiology interpretation: No new radiology studies obtained; I plan to obtain a KUB this morning  Labs reviewed: Labs still pending this morning  Any tests that were considered but not ordered: None at this time     Decision rules/scores evaluated (example [id number], Wells, etc): None at this time     Discussed with: Bedside nurse Araceli.  No family at bedside this morning.     Care Planning  Shared decision making: Discussed at length with bedside nurse Araceli.  No family at bedside this morning  Code status and discussions: DO NOT RESUSCITATE and DO NOT INTUBATE    Disposition  Social Determinants of Health that impact treatment or disposition: Patient is from [facility] facility  I expect the patient to be discharged to nursing home facility in 2-4 days    Electronically signed by Arsalan Delarosa MD, 11/27/23, 06:56 CST.

## 2023-11-27 NOTE — ED PROVIDER NOTES
Subjective   History of Present Illness  Patient is unable to provide any history due to being non-verbal 2/2 anoxic brain injury. EMS states the patient had a fever and nursing home was concerned about sepsis.         Review of Systems   Unable to perform ROS: Patient nonverbal       Past Medical History:   Diagnosis Date    Acute kidney failure, unspecified     Angina at rest     Anoxic brain damage, not elsewhere classified     Chronic pulmonary embolism     Chronic respiratory failure, unspecified whether with hypoxia or hypercapnia     Dependence on respirator (ventilator) status     Gastrointestinal hemorrhage, unspecified     Hypercalcemia     Iron deficiency anemia     Metabolic encephalopathy     Migraine     Sees Pain Management for injections.     MVP (mitral valve prolapse)     Other dysphagia     Other pulmonary embolism with acute cor pulmonale     Renal disorder     Ruptured bladder     Syncope     Urinary tract infection, site not specified        Allergies   Allergen Reactions    Coconut Unknown - High Severity    Levaquin [Levofloxacin] Other (See Comments)     unknown    Nuts Unknown - High Severity    Penicillins     [place] Other (See Comments)     Causes migraines per pt reports       Past Surgical History:   Procedure Laterality Date    ABDOMINAL SURGERY      BREAST BIOPSY Right 2011    benign    GTUBE INSERTION      INSERTION HEMODIALYSIS CATHETER Left 09/16/2021    Procedure: HEMODIALYSIS CATHETER INSERTION;  Surgeon: Anders Kaur MD;  Location: [place] [address];  Service: Vascular;  Laterality: Left;    TONSILLECTOMY      TRACHEOSTOMY         Family History   Problem Relation Age of Onset    Colon cancer Maternal Aunt 52    Fibromyalgia Mother     Heart disease Mother     Hypertension Mother     Hodgkin's lymphoma Paternal Grandmother     Ovarian cancer Neg Hx     Breast cancer Neg Hx        Social History     Socioeconomic History    Marital status:    Tobacco Use     Smoking status: Never    Smokeless tobacco: Never   Vaping Use    Vaping Use: Never used   Substance and Sexual Activity    Alcohol use: No    Drug use: No           Objective   Physical Exam  Vitals and nursing note reviewed.   Constitutional:       General: She is in acute distress (due to possible pain).      Appearance: Normal appearance. She is not toxic-appearing or diaphoretic.   HENT:      Head: Normocephalic and atraumatic.      Right Ear: External ear normal.      Left Ear: External ear normal.      Nose: Nose normal.      Mouth/Throat:      Mouth: Mucous membranes are moist.   Eyes:      General:         Right eye: No discharge.         Left eye: No discharge.      Extraocular Movements: Extraocular movements intact.      Conjunctiva/sclera: Conjunctivae normal.   Cardiovascular:      Rate and Rhythm: Regular rhythm. Tachycardia present.   Pulmonary:      Effort: Pulmonary effort is normal. No respiratory distress.      Breath sounds: No rhonchi.   Abdominal:      General: Abdomen is flat.      Tenderness: There is no abdominal tenderness. There is no guarding or rebound.      Comments: Nephrostomy tube in place with sludge like urine   Musculoskeletal:         General: No deformity or signs of injury.   Skin:     General: Skin is warm.      Coloration: Skin is not jaundiced.   Neurological:      Mental Status: Mental status is at baseline.      Comments: Contractures present   Psychiatric:         Mood and Affect: Mood normal.         Behavior: Behavior normal.         Thought Content: Thought content normal.         Judgment: Judgment normal.         Procedures           ED Course  ED Course as of 11/27/23 1612   Sat Nov 25, 2023 2041 Discussed patient case with the  transfer center who stated the patient does not need to be transferred at this time as the patient does not have any obstruction in the nephrostomy tube and it was recently exchanged and will need to be exchanged every 90 days or so.   Recommended IV antibiotics admission here for treatment of her sepsis from the UTI. [NP]      ED Course User Index  [NP] Kristi Lenz MD                                           Medical Decision Making  Namita Zabala is a very pleasant 48 y.o. female who presents to the ED for possible bacteremia.     Patient was non-toxic and not-ill appearing on arrival.     Vital signs stable although she was apparently hypotensive at the nursing home. Here she is tachycardic.     Patient's presentation raises suspicion for differentials including, but not limited to, sepsis, pna, electrolyte abnormality.     External (non-ED) record review: none    Given this, Namita was placed on the monitor. Laboratory studies and imaging studies were ordered including blood cultures, cbc, bmp, lactic acid, CT head, CXR and UA. She does have a UTI and was febrile. She was given tylenol suppository. Given pain medication as she winces every few minutes from what could be pain. Discussed with hospitalist and with urologist given her nephrostomy tube placed. Given broad spectrum antibiotics and sepsis bolus. Admitted in stable condition after discussion with her  at bedside.        Signed by:   Kristi Lenz MD 11/27/2023 16:10 [facility]   Emergency Medicine Physician    Dragon disclaimer:  Part of this note may be an electronic transcription/translation of spoken language to printed text using the Dragon Dictation System.         Problems Addressed:  Sepsis with acute organ dysfunction without septic shock, due to unspecified organism, unspecified organ dysfunction type: complicated acute illness or injury    Amount and/or Complexity of Data Reviewed  Labs: ordered.  Radiology: ordered.    Risk  OTC drugs.  Prescription drug management.  Decision regarding hospitalization.        Final diagnoses:   Sepsis with acute organ dysfunction without septic shock, due to unspecified organism, unspecified organ dysfunction type       ED  Disposition  ED Disposition       ED Disposition   Decision to Admit    Condition   --    Comment   Level of Care: Critical Care [6]   Diagnosis: Sepsis [[id number]]   Admitting Physician: CAYETANO VELEZ [[id number]]   Attending Physician: CAYETANO VELEZ [[id number]]   Certification: I Certify That Inpatient Hospital Services Are Medically Necessary For Greater Than 2 Midnights                 No follow-up provider specified.       Medication List      No changes were made to your prescriptions during this visit.            Kristi Lenz MD  11/27/23 0801

## 2023-11-27 NOTE — CONSULTS
[facility] Palliative Care Services    Palliative Care Initial Consult   Attending Physician: Arsalan Delarosa MD  Referring Provider: Amado Villarreal MD    Reason for Referral: assistance with clarification of goals of care  Family/Support: Spouse    Code Status and Medical Interventions:   Ordered at: 11/25/23 2128     Medical Intervention Limits:    NO intubation (DNI)     Level Of Support Discussed With:    Health Care Surrogate     Code Status (Patient has no pulse and is not breathing):    No CPR (Do Not Attempt to Resuscitate)     Medical Interventions (Patient has pulse or is breathing):    Limited Support     Comments:    No CPR, per prior documentation     Goals of Care: TBD.    HPI:   48 y.o. female has a past medical history of anoxic brain injury, tracheostomy present, chronic pulmonary embolism, Chronic respiratory failure, retroperitoneal hematoma and hematoma with abscess leading to necrotizing infection of the pelvis and bladder rupture s/p nephroureteral stent placement, iron deficiency anemia, Migraine, mitral valve prolapse, and dysphagia s/p PEG.  Resident of [facility] over the last one month.  Hospitalization 10/8-10/9 due to sepsis secondary to UTI and infection associated with nephrostomy catheter, transferred to tertiary facility at  for left percutaneous nephroureteral tube exchange and gastrostomy tube replacement was completed on 10/18; ED visit 10/1 due to tracheostomy hemorrhage, UTI, and fever; ED visit 9/15 due to COVID-19, UTI, and acute hypernatremia.  Last seen by palliative services in October 2023 at which time patient was transferred to tertiary facility for higher level of care as recommended by urology secondary to complicated UTI and felt likely needed nephroureteral tube replacement-last placed in July 2023 by  in addition to PEG tube replacement, unable to speak with family during hospitalization.    Patient presented to  [facility] on 11/25/2023 related to abnormal urine and noted to be due for nephrostomy tube replacement on January 11.  ED workup shows hypernatremia, urinalysis 3+ leuk esterase/positive nitrite/3+ blood/4+ bacteria.  CT abdomen pelvis shows left-sided nephrostomy tube remains in place, intraureteral stent, bilateral nephrolithiasis with stone and right renal pelvis, urothelial thickening within upper tracts of both kidneys as well as renal pelves bilaterally suggesting inflammation, persistent mild to moderate dilatation of upper tracts of both kidneys similar to previous exam, Mojica catheter is in place, moderate constipation including fecal impaction, Dion umbilical hernia stable from previous findings, gastrostomy tube in place.  Chest imaging shows tracheostomy tube and right basalar atelectasis.  Started on IV antibiotics and bowel regimen.  Required vasopressor support initially and these have been weaned off.  Started on tube feeds and free water replacement.  Remains on trach collar.  1 of 4 bottles of blood cultures positive for coagulase negative staph likely contaminant.  Mojica catheter changed by nursing without issue. Case discussed with nurse, Araceli. Sodium improving.  Blood and urine cultures pending.  Remains on trach collar, 8 L.  Tube feeds infusing.  Grimaces with any stimulus.  Does not open eyes.  No family at bedside.  At 12:55 PM call placed to patient's spouse-Grant, no answer, left voicemail and awaiting callback.     Review of Systems   Unable to perform ROS: Acuity of condition     1- Pain Assessment  CPOT Facial Expression: 0-->relaxed, neutral  CPOT Body Movements: 0-->absence of movements  CPOT Muscle Tension: 0-->relaxed  Ventilator Compliance/Vocalization: 0-->talking in normal tone or no sound  CPOT Score: 0    Past Medical History:   Diagnosis Date    Acute kidney failure, unspecified     Angina at rest     Anoxic brain damage, not elsewhere classified     Chronic  pulmonary embolism     Chronic respiratory failure, unspecified whether with hypoxia or hypercapnia     Dependence on respirator (ventilator) status     Gastrointestinal hemorrhage, unspecified     Hypercalcemia     Iron deficiency anemia     Metabolic encephalopathy     Migraine     Sees Pain Management for injections.     MVP (mitral valve prolapse)     Other dysphagia     Other pulmonary embolism with acute cor pulmonale     Renal disorder     Ruptured bladder     Syncope     Urinary tract infection, site not specified      Past Surgical History:   Procedure Laterality Date    ABDOMINAL SURGERY      BREAST BIOPSY Right 2011    benign    GTUBE INSERTION      INSERTION HEMODIALYSIS CATHETER Left 09/16/2021    Procedure: HEMODIALYSIS CATHETER INSERTION;  Surgeon: Anders Kaur MD;  Location: [place] [address];  Service: Vascular;  Laterality: Left;    TONSILLECTOMY      TRACHEOSTOMY       Social History     Socioeconomic History    Marital status:    Tobacco Use    Smoking status: Never    Smokeless tobacco: Never   Vaping Use    Vaping Use: Never used   Substance and Sexual Activity    Alcohol use: No    Drug use: No         Current Facility-Administered Medications:     acetaminophen (TYLENOL) tablet 500 mg, 500 mg, Per G Tube, Q4H PRN, Wayne Pimentel DO    baclofen (LIORESAL) tablet 20 mg, 20 mg, Per G Tube, Q6H, Wayne Pimentel DO, 20 mg at 11/27/23 0554    bisacodyl (DULCOLAX) suppository 10 mg, 10 mg, Rectal, Q48H PRN, Wayne Pimentel DO, 10 mg at 11/26/23 1132    cefepime 2 gm IVPB in 100 ml NS (MBP), 2,000 mg, Intravenous, Q8H, Arsalan Delarosa MD, 2,000 mg at 11/27/23 0456    chlorhexidine (PERIDEX) 0.12 % solution 15 mL, 15 mL, Mouth/Throat, BID, Wayne Pimentel DO, 15 mL at 11/26/23 2237    Enoxaparin Sodium (LOVENOX) syringe 40 mg, 40 mg, Subcutaneous, Q24H, Amado Villarreal MD, 40 mg at 11/26/23 1408    HYDROcodone-acetaminophen (NORCO) 5-325 MG per tablet 1  tablet, 1 tablet, Oral, Q6H PRN, Wayne Pimentel DO, 1 tablet at 11/26/23 2245    hydrOXYzine (ATARAX) tablet 25 mg, 25 mg, Per G Tube, Q6H, Wayne Pimentel DO, 25 mg at 11/27/23 0554    ipratropium-albuterol (DUO-NEB) nebulizer solution 3 mL, 3 mL, Nebulization, Q4H PRN, Wayne Pimentel DO    lactated ringers infusion, 100 mL/hr, Intravenous, Continuous, Wayne Pimentel DO, Last Rate: 100 mL/hr at 11/26/23 1748, 100 mL/hr at 11/26/23 1748    [START ON 11/28/2023] lansoprazole (PREVACID SOLUTAB) disintegrating tablet Tablet Delayed Release Dispersible 15 mg, 15 mg, Per G Tube, Q AM, Arsalan Delarosa MD    miconazole (MICOTIN) 2 % powder 1 application , 1 application , Topical, BID PRN, Wayne Pimentel DO    multivitamin with minerals 1 tablet, 1 tablet, Oral, Daily, Wayne Pimentel DO, 1 tablet at 11/26/23 0848    nystatin (MYCOSTATIN) powder 1 application , 1 application , Topical, BID, Wayne Pimentel DO, 1 application  at 11/26/23 2237    ondansetron (ZOFRAN) 4 MG/5ML oral solution 4 mg, 4 mg, Oral, Q6H PRN, Wayne Pimentel DO    ondansetron (ZOFRAN) injection 4 mg, 4 mg, Intravenous, Q6H PRN, Wayne Pimentel DO    polyethylene glycol (MIRALAX) packet 17 g, 17 g, Oral, Daily, Amado Villarreal MD, 17 g at 11/26/23 1123    rosuvastatin (CRESTOR) tablet 5 mg, 5 mg, Per G Tube, Daily, Wayne Pimentel DO, 5 mg at 11/26/23 0848    saccharomyces boulardii (FLORASTOR) capsule 250 mg, 250 mg, Per G Tube, BID, Arsalan Delarosa MD    sennosides-docusate (PERICOLACE) 8.6-50 MG per tablet 2 tablet, 2 tablet, Oral, BID, Wayne Pimentel DO, 2 tablet at 11/26/23 2245    simethicone (MYLICON) 40 MG/0.6ML drops 20 mg, 20 mg, Per G Tube, Q6H, Wayne Pimentel DO, 20 mg at 11/27/23 0554    sodium chloride 0.9 % flush 10 mL, 10 mL, Intravenous, Q12H, Wayne Pimentel DO, 10 mL at 11/26/23 2238    sodium chloride 0.9 % flush 10 mL, 10 mL, Intravenous, PRN, Margarito, Ali Torri,  "DO    sodium chloride 0.9 % flush 10 mL, 10 mL, Intravenous, Q12H, Amado Villarreal MD, 10 mL at 11/26/23 2237    sodium chloride 0.9 % flush 10 mL, 10 mL, Intravenous, Q12H, Amado Villarreal MD, 10 mL at 11/26/23 2237    sodium chloride 0.9 % flush 10 mL, 10 mL, Intravenous, Q12H, Amado Villarreal MD, 10 mL at 11/26/23 2237    sodium chloride 0.9 % flush 10 mL, 10 mL, Intravenous, PRN, Amado Villarreal MD    sodium chloride 0.9 % flush 20 mL, 20 mL, Intravenous, PRN, Amado Villarreal MD    sodium chloride 0.9 % infusion 40 mL, 40 mL, Intravenous, PRN, Wayne Pimentel DO    sodium chloride 0.9 % infusion 40 mL, 40 mL, Intravenous, PRN, Amado Villarreal MD    vancomycin (VANCOCIN) 1,000 mg in sodium chloride 0.9 % 250 mL IVPB-VTB, 1,000 mg, Intravenous, Q12H, Wayne Pimentel DO, Last Rate: 250 mL/hr at 11/26/23 2114, 1,000 mg at 11/26/23 2114    zinc oxide 20 % ointment, , Topical, Q4H PRN, Wayne Pimentel DO    Allergies   Allergen Reactions    Coconut Unknown - High Severity    Levaquin [Levofloxacin] Other (See Comments)     unknown    Nuts Unknown - High Severity    Penicillins     [place] Other (See Comments)     Causes migraines per pt reports     I have utilized all available immediate resources to obtain, update, or review the patient's current medications (including all prescriptions, over-the-counter products, herbals, cannabis/cannabidiol products, and vitamin/mineral/dietary (nutritional) supplements) for name, route of administration, type, dose and frequency.      Intake/Output Summary (Last 24 hours) at 11/27/2023 0783  Last data filed at 11/27/2023 0456  Gross per 24 hour   Intake 4031.75 ml   Output 1700 ml   Net 2331.75 ml       Physical Exam:    Diagnostics: Reviewed  /58   Pulse 75   Temp 98.2 °F (36.8 °C) (Axillary)   Resp 18   Ht 152.4 cm (60\")   Wt 59.2 kg (130 lb 9.6 oz)   LMP  (LMP Unknown)   SpO2 97%   BMI 25.51 kg/m²     Vitals and nursing note reviewed.   Constitutional: "       General: Sleeping.      Appearance: Ill-appearing and chronically ill-appearing.      Comments: Trach collar.  Left-sided nephrostomy tube.   Eyes:      General: Lids are normal.   HENT:      Head: Normocephalic.   Neck:      Trachea: Tracheostomy present.   Pulmonary:      Effort: Pulmonary effort is normal.   Cardiovascular:      Normal rate.   Edema:     Peripheral edema present.  Abdominal:      Palpations: Abdomen is soft.      Comments: PEG tube with tube feedings infusing.   Musculoskeletal:      Cervical back: Torticollis present.      Comments: Contractures Skin:     General: Skin is warm.   Genitourinary:     Comments: Mojica catheter in place  Neurological:      Comments: Grimaces with any stimulus.  Does not open eyes.     Patient status: Disease state: Controlled with current treatments.  Current Functional status: Palliative Performance Scale Score: Performance 10% based on the following measures: Ambulation: Totally bed bound, Activity and Evidence of Disease: Unable to do any work, extensive evidence of disease, Self-Care: Total care required,  Intake: Mouth care only, LOC: Drowsy or comatose   Baseline Functional status: Palliative Performance Scale Score: Performance 10% based on the following measures: Ambulation: Totally bed bound, Activity and Evidence of Disease: Unable to do any work, extensive evidence of disease, Self-Care: Total care required,  Intake: Mouth care only, LOC: Drowsy or comatose   Nutritional status: Albumin 3.3 Body mass index is 25.51 kg/m².         Hospital Problem List      Sepsis    Acute respiratory failure with hypoxia    Sepsis secondary to UTI    MVP (mitral valve prolapse)    Myofascial pain syndrome    Severe malnutrition    Functional quadriplegia    Tracheostomy present    PEG tube malfunction    Impression/Problem List:    Sepsis due to UTI  Acute on chronic respiratory failure with hypoxia  History of anoxic brain injury  History of retroperitoneal hematoma  and hematoma with abscess leading to necrotizing infection of the pelvis and bladder rupture s/p nephroureteral stent placement,  Tracheostomy present  Functional quadriplegia  Hypertension  Hypoalbuminemia  Bilateral nephrolithiasis, left-sided nephrostomy tube in place  Chronic pulmonary embolism  Iron deficiency anemia  Migraine  Mitral valve prolapse  Dysphagia s/p PEG  Contractures  Debility      Recommendations/Plan:  1. plan: Goals of care include CODE STATUS no CPR/limited support interventions per attending.    Family support: The patient receives support from her ..  Advance Directives: Advance Directive Status: Patient does not have advance directive   POA/Healthcare surrogate-spouse, Grant-next of kin.    2.  Palliative care encounter  - Prognosis is poor long-term secondary to sepsis,  history of anoxic brain injury, tracheostomy, quadriplegia, multiple comorbidities, contractures, and debility.    -Resident of [facility]    -Last seen by palliative services in October 2023 at which time patient was transferred to tertiary facility for higher level of care as recommended by urology secondary to complicated UTI and felt likely needed nephroureteral tube replacement-last placed in July 2023 by  in addition to PEG tube replacement (completed 10/18), unable to speak with family during hospitalization.    -Started on IV antibiotics and bowel regimen.  Required vasopressor support initially and these have been weaned off.  Started on tube feeds and free water replacement.  Remains on trach collar.  1 of 4 bottles of blood cultures positive for coagulase negative staph likely contaminant.    11/27-Anticipate Mojica catheter change, previously followed by urology secondary to difficult placement.    11/27-goals of care to be determined. At 12:55 PM call placed to patient's spouse-Grant, no answer, left voicemail and awaiting callback.    ADDENDUM at 1:50 PM spoke with spouse, Grant via  "telephone.  Discussed poor long-term prognosis secondary to multiple factors as above.  High risk for rehospitalizations and continued decline.  Spouse seems to have good prognostic awareness \"I just pray everyday that she will not suffer\".  Clarified CODE STATUS and will plan to complete a MOST document to include no CPR/limited support interventions, no intubation, no ventilator support, no NIPPV.  Spouse to sign upon his arrival when he visits.  Document will be in patient's paper chart. Nurse notified electronically.  Will plan to follow-up on Wednesday or sooner if needed.      Thank you for this consult and allowing us to participate in patient's plan of care. Palliative Care Team will continue to follow patient.     Roxane Sahni, APRN  11/27/2023  07:45 CST              "

## 2023-11-28 LAB
ANION GAP SERPL CALCULATED.3IONS-SCNC: 7 MMOL/L (ref 5–15)
BUN SERPL-MCNC: 17 MG/DL (ref 6–20)
BUN/CREAT SERPL: 60.7 (ref 7–25)
CALCIUM SPEC-SCNC: 8.9 MG/DL (ref 8.6–10.5)
CHLORIDE SERPL-SCNC: 110 MMOL/L (ref 98–107)
CO2 SERPL-SCNC: 26 MMOL/L (ref 22–29)
CREAT SERPL-MCNC: 0.28 MG/DL (ref 0.57–1)
EGFRCR SERPLBLD CKD-EPI 2021: 134.9 ML/MIN/1.73
GLUCOSE BLDC GLUCOMTR-MCNC: 137 MG/DL (ref 70–130)
GLUCOSE BLDC GLUCOMTR-MCNC: 173 MG/DL (ref 70–130)
GLUCOSE BLDC GLUCOMTR-MCNC: 93 MG/DL (ref 70–130)
GLUCOSE SERPL-MCNC: 131 MG/DL (ref 65–99)
POTASSIUM SERPL-SCNC: 4.3 MMOL/L (ref 3.5–5.2)
SODIUM SERPL-SCNC: 143 MMOL/L (ref 136–145)

## 2023-11-28 PROCEDURE — 94799 UNLISTED PULMONARY SVC/PX: CPT

## 2023-11-28 PROCEDURE — 25010000002 CEFEPIME PER 500 MG: Performed by: INTERNAL MEDICINE

## 2023-11-28 PROCEDURE — 80048 BASIC METABOLIC PNL TOTAL CA: CPT | Performed by: INTERNAL MEDICINE

## 2023-11-28 PROCEDURE — 25810000003 LACTATED RINGERS PER 1000 ML: Performed by: INTERNAL MEDICINE

## 2023-11-28 PROCEDURE — 25010000002 ENOXAPARIN PER 10 MG: Performed by: FAMILY MEDICINE

## 2023-11-28 PROCEDURE — 82948 REAGENT STRIP/BLOOD GLUCOSE: CPT

## 2023-11-28 RX ORDER — SODIUM CHLORIDE, SODIUM LACTATE, POTASSIUM CHLORIDE, CALCIUM CHLORIDE 600; 310; 30; 20 MG/100ML; MG/100ML; MG/100ML; MG/100ML
50 INJECTION, SOLUTION INTRAVENOUS CONTINUOUS
Status: DISCONTINUED | OUTPATIENT
Start: 2023-11-28 | End: 2023-12-05 | Stop reason: HOSPADM

## 2023-11-28 RX ADMIN — Medication 10 ML: at 09:49

## 2023-11-28 RX ADMIN — Medication 20 MG: at 18:04

## 2023-11-28 RX ADMIN — BACLOFEN 20 MG: 10 TABLET ORAL at 18:03

## 2023-11-28 RX ADMIN — CHLORHEXIDINE GLUCONATE 1 APPLICATION: 500 CLOTH TOPICAL at 03:19

## 2023-11-28 RX ADMIN — BACLOFEN 20 MG: 10 TABLET ORAL at 22:25

## 2023-11-28 RX ADMIN — HYDROXYZINE HYDROCHLORIDE 25 MG: 25 TABLET ORAL at 00:01

## 2023-11-28 RX ADMIN — Medication 20 MG: at 05:27

## 2023-11-28 RX ADMIN — CHLORHEXIDINE GLUCONATE 15 ML: 1.2 SOLUTION ORAL at 22:26

## 2023-11-28 RX ADMIN — HYDROXYZINE HYDROCHLORIDE 25 MG: 25 TABLET ORAL at 05:26

## 2023-11-28 RX ADMIN — SODIUM CHLORIDE, POTASSIUM CHLORIDE, SODIUM LACTATE AND CALCIUM CHLORIDE 100 ML/HR: 600; 310; 30; 20 INJECTION, SOLUTION INTRAVENOUS at 03:46

## 2023-11-28 RX ADMIN — SODIUM CHLORIDE, POTASSIUM CHLORIDE, SODIUM LACTATE AND CALCIUM CHLORIDE 100 ML/HR: 600; 310; 30; 20 INJECTION, SOLUTION INTRAVENOUS at 18:43

## 2023-11-28 RX ADMIN — CEFEPIME 2000 MG: 2 INJECTION, POWDER, FOR SOLUTION INTRAVENOUS at 12:09

## 2023-11-28 RX ADMIN — ENOXAPARIN SODIUM 40 MG: 100 INJECTION SUBCUTANEOUS at 13:28

## 2023-11-28 RX ADMIN — HYDROXYZINE HYDROCHLORIDE 25 MG: 25 TABLET ORAL at 18:03

## 2023-11-28 RX ADMIN — LANSOPRAZOLE 15 MG: 15 TABLET, ORALLY DISINTEGRATING ORAL at 05:26

## 2023-11-28 RX ADMIN — Medication 1 TABLET: at 09:48

## 2023-11-28 RX ADMIN — HYDROXYZINE HYDROCHLORIDE 25 MG: 25 TABLET ORAL at 12:09

## 2023-11-28 RX ADMIN — CEFEPIME 2000 MG: 2 INJECTION, POWDER, FOR SOLUTION INTRAVENOUS at 22:23

## 2023-11-28 RX ADMIN — BACLOFEN 20 MG: 10 TABLET ORAL at 12:09

## 2023-11-28 RX ADMIN — BISACODYL 10 MG: 10 SUPPOSITORY RECTAL at 09:48

## 2023-11-28 RX ADMIN — Medication 10 ML: at 22:26

## 2023-11-28 RX ADMIN — Medication 20 MG: at 22:26

## 2023-11-28 RX ADMIN — HYDROCODONE BITARTRATE AND ACETAMINOPHEN 1 TABLET: 5; 325 TABLET ORAL at 22:26

## 2023-11-28 RX ADMIN — HYDROXYZINE HYDROCHLORIDE 25 MG: 25 TABLET ORAL at 22:26

## 2023-11-28 RX ADMIN — ROSUVASTATIN CALCIUM 5 MG: 5 TABLET, FILM COATED ORAL at 09:48

## 2023-11-28 RX ADMIN — Medication 250 MG: at 09:48

## 2023-11-28 RX ADMIN — Medication 20 MG: at 12:09

## 2023-11-28 RX ADMIN — POLYETHYLENE GLYCOL 3350 17 G: 17 POWDER, FOR SOLUTION ORAL at 09:48

## 2023-11-28 RX ADMIN — CEFEPIME 2000 MG: 2 INJECTION, POWDER, FOR SOLUTION INTRAVENOUS at 05:26

## 2023-11-28 RX ADMIN — Medication 1 APPLICATION: at 09:48

## 2023-11-28 RX ADMIN — BACLOFEN 20 MG: 10 TABLET ORAL at 05:26

## 2023-11-28 RX ADMIN — Medication 10 ML: at 09:48

## 2023-11-28 RX ADMIN — CHLORHEXIDINE GLUCONATE 15 ML: 1.2 SOLUTION ORAL at 09:48

## 2023-11-28 NOTE — PLAN OF CARE
Goal Outcome Evaluation:  Plan of Care Reviewed With: other (see comments)        Progress: no change  Outcome Evaluation: Ntn follow up. Pt continues with enteral ntn per g-tube. Pt received 81% of enteral ntn over the past two days. Tolerating enteral ntn with minimal residuals noted. Cont enteral ntn per orders. Con to follow for plan of care.

## 2023-11-28 NOTE — PROGRESS NOTES
[facility] Palliative Care Services    Palliative Care Daily Progress Note   Chief complaint-follow up support for patient/family    Code Status:   Code Status and Medical Interventions:   Ordered at: 11/27/23 1357     Medical Intervention Limits:    NO intubation (DNI)    Other    NO NIPPV (Non-Invasive Positive Pressure Ventilation)     Level Of Support Discussed With:    Next of Kin (If No Surrogate)     Code Status (Patient has no pulse and is not breathing):    No CPR (Do Not Attempt to Resuscitate)     Medical Interventions (Patient has pulse or is breathing):    Limited Support     Comments:    per spouse, Grant     Additional Medical Interventions Limits:    No ventilator/NIPPV      Advanced Directives: Advance Directive Status: Patient does not have advance directive   Goals of Care: Ongoing.     S: Medical record reviewed. Events noted.  No new labs to review.  Evaluated by urology yesterday who recommended obtaining urine culture after new Mojica catheter placed yesterday, however I do not see culture was obtained.  Urology recommends changing Mojica catheter every 4 weeks.  Remains on trach collar with 8 L oxygen.     Review of Systems   Unable to perform ROS: Acuity of condition     Pain Assessment  CPOT and PAINAD Scales: CPOT (Critical-Care Pain Observation Tool)  CPOT Facial Expression: 0-->relaxed, neutral  CPOT Body Movements: 0-->absence of movements  CPOT Muscle Tension: 0-->relaxed  Ventilator Compliance/Vocalization: 0-->talking in normal tone or no sound  CPOT Score: 0    O:     Intake/Output Summary (Last 24 hours) at 11/28/2023 0935  Last data filed at 11/28/2023 0740  Gross per 24 hour   Intake 3530.88 ml   Output 1425 ml   Net 2105.88 ml       Diagnostics and current medications: Reviewed.      Current Facility-Administered Medications:     acetaminophen (TYLENOL) tablet 500 mg, 500 mg, Per G Tube, Q4H PRN, Wayne Pimentel,     baclofen (LIORESAL) tablet 20 mg, 20  mg, Per G Tube, Q6H, Wayne Pimentel DO, 20 mg at 11/28/23 0526    bisacodyl (DULCOLAX) suppository 10 mg, 10 mg, Rectal, Q48H PRN, Wayne Pimentel DO, 10 mg at 11/26/23 1132    bisacodyl (DULCOLAX) suppository 10 mg, 10 mg, Rectal, Daily, Arsalan Delarosa MD, 10 mg at 11/27/23 1454    cefepime 2 gm IVPB in 100 ml NS (MBP), 2,000 mg, Intravenous, Q8H, Arsalan Delarosa MD, 2,000 mg at 11/28/23 0526    chlorhexidine (PERIDEX) 0.12 % solution 15 mL, 15 mL, Mouth/Throat, BID, Wayne Pimentel DO, 15 mL at 11/27/23 2113    Chlorhexidine Gluconate Cloth 2 % pads 1 application , 1 application , Topical, Once, Arsalan Delarosa MD    Chlorhexidine Gluconate Cloth 2 % pads 1 application , 1 application , Topical, Q24H, Arsalan Delarosa MD, 1 application  at 11/28/23 0319    Enoxaparin Sodium (LOVENOX) syringe 40 mg, 40 mg, Subcutaneous, Q24H, Amado Villarreal MD, 40 mg at 11/27/23 1454    HYDROcodone-acetaminophen (NORCO) 5-325 MG per tablet 1 tablet, 1 tablet, Oral, Q6H PRN, Wayne Pimentel DO, 1 tablet at 11/27/23 2120    hydrOXYzine (ATARAX) tablet 25 mg, 25 mg, Per G Tube, Q6H, Wayne Pimentel DO, 25 mg at 11/28/23 0526    ipratropium-albuterol (DUO-NEB) nebulizer solution 3 mL, 3 mL, Nebulization, Q4H PRN, Wayne Pimentel DO    lactated ringers infusion, 100 mL/hr, Intravenous, Continuous, Wanye Pimentel DO, Last Rate: 100 mL/hr at 11/28/23 0346, 100 mL/hr at 11/28/23 0346    lansoprazole (PREVACID SOLUTAB) disintegrating tablet Tablet Delayed Release Dispersible 15 mg, 15 mg, Per G Tube, Q AM, Arsalan Delarosa MD, 15 mg at 11/28/23 0526    multivitamin with minerals 1 tablet, 1 tablet, Oral, Daily, Wayne Pimentel DO, 1 tablet at 11/27/23 0858    mupirocin (BACTROBAN) 2 % nasal ointment 1 application , 1 application , Each Nare, BID, Arsalan Delarosa MD, 1 application  at 11/27/23 0546    ondansetron (ZOFRAN) 4 MG/5ML oral solution 4 mg, 4 mg, Oral, Q6H PRN,  Wayne Pimentel DO    ondansetron (ZOFRAN) injection 4 mg, 4 mg, Intravenous, Q6H PRN, Wayne Pimentel DO    polyethylene glycol (MIRALAX) packet 17 g, 17 g, Oral, Daily, Amado Villarreal MD, 17 g at 11/26/23 1123    rosuvastatin (CRESTOR) tablet 5 mg, 5 mg, Per G Tube, Daily, Wayne Pimentel DO, 5 mg at 11/27/23 0858    saccharomyces boulardii (FLORASTOR) capsule 250 mg, 250 mg, Per G Tube, BID, Arsalan Delarosa MD, 250 mg at 11/27/23 2113    simethicone (MYLICON) 40 MG/0.6ML drops 20 mg, 20 mg, Per G Tube, Q6H, Wayne Pimentel DO, 20 mg at 11/28/23 0527    sodium chloride 0.9 % flush 10 mL, 10 mL, Intravenous, Q12H, Wayne Pimentel DO, 10 mL at 11/27/23 2113    sodium chloride 0.9 % flush 10 mL, 10 mL, Intravenous, PRN, Wayne Pimentel DO    sodium chloride 0.9 % flush 10 mL, 10 mL, Intravenous, Q12H, Amado Villarreal MD, 10 mL at 11/27/23 2113    sodium chloride 0.9 % flush 10 mL, 10 mL, Intravenous, Q12H, Amado Villarreal MD, 10 mL at 11/27/23 2113    sodium chloride 0.9 % flush 10 mL, 10 mL, Intravenous, Q12H, Amado Villarreal MD, 10 mL at 11/27/23 2113    sodium chloride 0.9 % flush 10 mL, 10 mL, Intravenous, PRNPhil Khai C, MD    sodium chloride 0.9 % flush 20 mL, 20 mL, Intravenous, PRNPhil Khai C, MD    sodium chloride 0.9 % infusion 40 mL, 40 mL, Intravenous, PRN, Wayne Pimentel DO    sodium chloride 0.9 % infusion 40 mL, 40 mL, Intravenous, PRPhil ROBERTSON Khai C, MD    zinc oxide 20 % ointment, , Topical, Q4H PRN, Wayne Pimentel, DO    Allergies   Allergen Reactions    Coconut Unknown - High Severity    Levaquin [Levofloxacin] Other (See Comments)     unknown    Nuts Unknown - High Severity    Penicillins     [place] Other (See Comments)     Causes migraines per pt reports     I have utilized all available immediate resources to obtain, update, or review the patient's current medications (including all prescriptions, over-the-counter products, herbals,  "cannabis/cannabidiol products, and vitamin/mineral/dietary (nutritional) supplements) for name, route of administration, type, dose and frequency.    A:    /76   Pulse 73   Temp 98 °F (36.7 °C) (Oral)   Resp 15   Ht 152.4 cm (60\")   Wt 59.6 kg (131 lb 4.8 oz)   LMP  (LMP Unknown)   SpO2 100%   BMI 25.64 kg/m²   Expand All Collapse All      Diagnostics: Reviewed  /58   Pulse 75   Temp 98.2 °F (36.8 °C) (Axillary)   Resp 18   Ht 152.4 cm (60\")   Wt 59.2 kg (130 lb 9.6 oz)   LMP  (LMP Unknown)   SpO2 97%   BMI 25.51 kg/m²       Patient status: Disease state: Controlled with current treatments.  Current Functional status: Palliative Performance Scale Score: Performance 10% based on the following measures: Ambulation: Totally bed bound, Activity and Evidence of Disease: Unable to do any work, extensive evidence of disease, Self-Care: Total care required,  Intake: Mouth care only, LOC: Drowsy or comatose   Baseline Functional status: Palliative Performance Scale Score: Performance 10% based on the following measures: Ambulation: Totally bed bound, Activity and Evidence of Disease: Unable to do any work, extensive evidence of disease, Self-Care: Total care required,  Intake: Mouth care only, LOC: Drowsy or comatose   Nutritional status: Albumin 3.3 Body mass index is 25.51 kg/m².      Hospital Problem List      Sepsis    Acute respiratory failure with hypoxia    Sepsis secondary to UTI    MVP (mitral valve prolapse)    Myofascial pain syndrome    Severe malnutrition    Functional quadriplegia    Tracheostomy present    PEG tube malfunction     Impression/Problem List:     Sepsis due to UTI  Acute on chronic respiratory failure with hypoxia  History of anoxic brain injury  History of retroperitoneal hematoma and hematoma with abscess leading to necrotizing infection of the pelvis and bladder rupture s/p nephroureteral stent placement,  Tracheostomy present  Functional " "quadriplegia  Hypertension  Hypoalbuminemia  Bilateral nephrolithiasis, left-sided nephrostomy tube in place  Chronic pulmonary embolism  Iron deficiency anemia  Migraine  Mitral valve prolapse  Dysphagia s/p PEG  Contractures  Debility       Recommendations/Plan:  1. plan: Goals of care include CODE STATUS no CPR/limited support interventions per attending.     Family support: The patient receives support from her ..  Advance Directives: Advance Directive Status: Patient does not have advance directive   POA/Healthcare surrogate-spouse, Grant-next of kin.     2.  Palliative care encounter  - Prognosis is poor long-term secondary to sepsis,  history of anoxic brain injury, tracheostomy, quadriplegia, multiple comorbidities, contractures, and debility.     -Resident of [facility]     -Last seen by palliative services in October 2023 at which time patient was transferred to tertiary facility for higher level of care as recommended by urology secondary to complicated UTI and felt likely needed nephroureteral tube replacement-last placed in July 2023 by  in addition to PEG tube replacement (completed 10/18), unable to speak with family during hospitalization.     -Started on IV antibiotics and bowel regimen.  Required vasopressor support initially and these have been weaned off.  Started on tube feeds and free water replacement.  Remains on trach collar.  1 of 4 bottles of blood cultures positive for coagulase negative staph likely contaminant.    11/27-Anticipate Mojica catheter change, previously followed by urology secondary to difficult placement.     11/27-spoke with spouse, Grant via telephone.  Discussed poor long-term prognosis secondary to multiple factors as above.  High risk for rehospitalizations and continued decline.  Spouse seems to have good prognostic awareness \"I just pray everyday that she will not suffer\".  Clarified CODE STATUS and will plan to complete a MOST document to " include no CPR/limited support interventions, no intubation, no ventilator support, no NIPPV.  Spouse to sign upon his arrival when he visits.  Document will be in patient's paper chart. Nurse notified electronically.      11/28-Will plan to follow-up on Wednesday or sooner if needed.  -Spouse to sign MOST when available.  Electronic communication to nursing.           Thank you for allowing us to participate in patient's plan of care. Palliative Care Team will continue to follow patient.     Roxane Sahni, APRN  11/28/2023  09:35 CST

## 2023-11-28 NOTE — PROGRESS NOTES
[facility] Medicine Services  INPATIENT PROGRESS NOTE    Patient Name: Namita Zabala  Date of Admission: 11/25/2023  Today's Date: 11/28/23  Length of Stay: 3  Primary Care Physician: David Lara MD    Subjective   Chief Complaint: follow-up sepsis  HPI   Patient unable to provide any additional history.  Discussed with bedside nurse, Migdalia.  No acute events from overnight.  Stratton catheter was changed yesterday.  Patient has been moving her bowels per nursing report this morning.  Remains off of vasopressors.  Has been tolerating her tube feeds and free water replacement.  No family at bedside this morning.    Review of Systems   All pertinent negatives and positives are as above. All other systems have been reviewed and are negative unless otherwise stated.     Objective    Temp:  [97.6 °F (36.4 °C)-99.3 °F (37.4 °C)] 99.3 °F (37.4 °C)  Heart Rate:  [] 104  Resp:  [11-19] 12  BP: (116-150)/(62-92) 135/78  Physical Exam  Vitals reviewed.   Constitutional:       Appearance: She is not toxic-appearing.   HENT:      Head: Normocephalic.      Mouth/Throat:      Mouth: Mucous membranes are moist.   Eyes:      Pupils: Pupils are equal, round, and reactive to light.   Neck:      Comments: Trach and trach collar in place  Cardiovascular:      Rate and Rhythm: Normal rate.   Pulmonary:      Effort: Pulmonary effort is normal. No respiratory distress.      Comments: Diminished at bases; trach collar at 8L/35%  Genitourinary:     Comments: New stratton in place; left nephrostomy tube in place with minimal output  Musculoskeletal:      Comments: Significant contractures notes; left thigh PICC in place   Skin:     General: Skin is warm.   Neurological:      Mental Status: She is alert. Mental status is at baseline.      Motor: Weakness present.      Comments: Patient unable to provide any additional history         Results Review:  I have reviewed the labs, radiology  results, and diagnostic studies.    Laboratory Data:   Results from last 7 days   Lab Units 11/27/23  0953 11/26/23  0333 11/25/23  1800   WBC 10*3/mm3 7.72 12.79* 10.63   HEMOGLOBIN g/dL 10.4* 12.0 12.9   HEMATOCRIT % 32.4* 39.7 41.8   PLATELETS 10*3/mm3 178 265 255        Results from last 7 days   Lab Units 11/27/23  0748 11/26/23  0333 11/25/23  1800   SODIUM mmol/L 146* 152* 157*   POTASSIUM mmol/L 3.9 4.1 4.0   CHLORIDE mmol/L 112* 115* 115*   CO2 mmol/L 23.0 29.0 31.0*   BUN mg/dL 26* 41* 48*   CREATININE mg/dL 0.31* 0.52* 0.62   CALCIUM mg/dL 9.0 9.8 10.5   BILIRUBIN mg/dL 1.0 1.1 0.7   ALK PHOS U/L 54 58 64   ALT (SGPT) U/L 14 19 23   AST (SGOT) U/L 15 15 17   GLUCOSE mg/dL 125* 101* 159*       Culture Data:   Blood Culture   Date Value Ref Range Status   11/25/2023 No growth at 24 hours  Preliminary   11/25/2023 Staphylococcus, coagulase negative (C)  Final       Radiology Data:   Imaging Results (Last 24 Hours)       Procedure Component Value Units Date/Time    XR Abdomen KUB [[id number]] Collected: 11/27/23 1121     Updated: 11/27/23 1133    Narrative:      EXAM: XR ABDOMEN KUB-      DATE: 11/27/2023 10:29 AM CST     HISTORY: constipation; A41.9-Sepsis, unspecified organism; R65.20-Severe  sepsis without septic shock       COMPARISON: 11/26/2023, 11/25/2023.     TECHNIQUE:  Supine view of the abdomen. 2 images.     FINDINGS:    Limited evaluation for free air on supine imaging. Nonobstructive supine  bowel gas pattern. Moderate amount of colonic gas and stool. PEG tube.     There is a 1.6 cm calcification projecting at the RIGHT renal pelvis,  similar compared to 11/25/2023 CT.     LEFT percutaneous nephrostomy tube with ureteral stent appears in  similar position.     Mojica catheter.     Interval placement of LEFT vascular catheter, tip projecting at the  level of L4.     Degenerative changes of the spine and pelvis. Sclerotic focus in the  LEFT greater trochanter appears similar to 9/14/2021, not  optimally  evaluated on this exam.          Impression:      1. Interval placement LEFT femoral vascular catheter, tip projecting at  the level of L4. This appears to project LEFT of midline, which could be  seen with arterial placement, but evaluation is limited due to rotation.  Recommend clinical correlation.  2. LEFT percutaneous nephrostomy tube with ureteral stent appears in  similar position compared to prior.  3. Similar 1.6 cm RIGHT renal pelvis calcification.  4. Moderate amount of clonic stool.     This report was signed and finalized on 11/27/2023 11:30 AM CST by Dr Dea Barksdale MD.               I have reviewed the patient's current medications.     Assessment/Plan   Assessment  Active Hospital Problems    Diagnosis     **Sepsis     Functional quadriplegia     Tracheostomy present     PEG tube malfunction     Severe malnutrition     Acute respiratory failure with hypoxia     Sepsis secondary to UTI     MVP (mitral valve prolapse)     Myofascial pain syndrome        Treatment Plan  IV Cefepime - day 4.  Patient appears to be making improvement on cefepime but will be mindful the patient has had infection with ESBL organism in the past.   Follow-up urine culture - currently with greater than 100,000 colony-forming units of gram-negative bacilli; follow-up ID and susceptibilities closely  1/4 bottle on blood cultures positive for coagulase-negative staph-likely contaminant; patient has also been on vancomycin.  Vancomycin trough returned at 27.1.  Plan to DC orders for vancomycin today.    Currently on trach collar at 8 L; wean as tolerated  CXR on admission with no acute infiltrates  Sodium trend improving on yesterdays labs.  BMP pending this AM  IV fluids with lactated Ringer's  Remains off of Levophed; maintaining MAPs 65 or greater  TFs for nutrition with free water replacement  Mojica changed 11/27  Urology recommendations reviewed and appreciate Dr. Bailon assistance -patient will need follow-up  at tertiary care facility for ongoing urology management with interventional radiology capability regarding her left percutaneous nephrostomy tube  Lovenox PPx  Bowel regimen  Transfer to medical floor today  Palliative Care following    Medical Decision Making  Number and Complexity of problems: High complexity; patient presented with sepsis likely secondary to urinary tract infection, previous history of ESBL infection, dehydration and intravascular volume depletion with evidence of hypernatremia, in addition to hypotension requiring vasopressors with Levophed for blood pressure support.  Multiple chronic comorbidities including bedbound state, reported history of anoxic brain injury (details unknown), chronic tracheostomy and PEG tube placement; also with chronic left nephrostomy tube      Conditions and Status        Condition is improving.     [facility] Data  External documents reviewed: none  Cardiac tracing (EKG, telemetry) interpretation: sinus rhythm in the 90s on telemetry monitor this morning  Radiology interpretation: KUB from yesterday reviewed; moderate colonic stool  Labs reviewed: Labs still pending this morning  Any tests that were considered but not ordered: None at this time     Decision rules/scores evaluated (example [id number], [place], etc): None at this time     Discussed with: Bedside nurse Migdalia     Care Planning  Shared decision making: Discussed with bedside nurse Migdalia.  No family at bedside this morning  Code status and discussions: DO NOT RESUSCITATE and DO NOT INTUBATE    Disposition  Social Determinants of Health that impact treatment or disposition: Patient is from [facility] facility  I expect the patient to be discharged to nursing home facility in 2-4 days    Electronically signed by Arsalan Delarosa MD, 11/28/23, 06:51 CST.

## 2023-11-28 NOTE — PLAN OF CARE
Norco x 1 for pain. No output out of nephrostomy. 775 mL urine output throughout shift. Tolerating tube feeds well. 1 BM.     Problem: Skin Injury Risk Increased  Goal: Skin Health and Integrity  Intervention: Optimize Skin Protection  Recent Flowsheet Documentation  Taken 11/28/2023 0400 by Sudeep Sargent RN  Pressure Reduction Techniques:   heels elevated off bed   pressure points protected   weight shift assistance provided  Head of Bed (HOB) Positioning: HOB at 30 degrees  Pressure Reduction Devices:   elbow protectors utilized   heel offloading device utilized   positioning supports utilized   pressure-redistributing mattress utilized   specialty bed utilized  Skin Protection:   adhesive use limited   incontinence pads utilized   skin-to-device areas padded   skin-to-skin areas padded   tubing/devices free from skin contact   transparent dressing maintained  Taken 11/28/2023 0200 by Sudeep Sargent RN  Head of Bed (HOB) Positioning: HOB at 30 degrees  Taken 11/28/2023 0000 by Sudeep Sargent RN  Pressure Reduction Techniques:   heels elevated off bed   pressure points protected   weight shift assistance provided  Head of Bed (HOB) Positioning: HOB at 30 degrees  Pressure Reduction Devices:   elbow protectors utilized   heel offloading device utilized   positioning supports utilized   pressure-redistributing mattress utilized   specialty bed utilized  Skin Protection:   adhesive use limited   incontinence pads utilized   skin-to-device areas padded   skin-to-skin areas padded   transparent dressing maintained   tubing/devices free from skin contact  Taken 11/27/2023 2200 by Sudeep Sargent RN  Head of Bed (HOB) Positioning: HOB at 30 degrees  Taken 11/27/2023 2000 by Sudeep Sargent RN  Pressure Reduction Techniques:   heels elevated off bed   pressure points protected   weight shift assistance provided  Head of Bed (HOB) Positioning: HOB at 30 degrees  Pressure Reduction Devices:   elbow protectors utilized    heel offloading device utilized   positioning supports utilized   pressure-redistributing mattress utilized   specialty bed utilized  Skin Protection:   adhesive use limited   incontinence pads utilized   skin-to-device areas padded   skin-to-skin areas padded   transparent dressing maintained   tubing/devices free from skin contact

## 2023-11-29 LAB
GLUCOSE BLDC GLUCOMTR-MCNC: 100 MG/DL (ref 70–130)
GLUCOSE BLDC GLUCOMTR-MCNC: 114 MG/DL (ref 70–130)
GLUCOSE BLDC GLUCOMTR-MCNC: 116 MG/DL (ref 70–130)
GLUCOSE BLDC GLUCOMTR-MCNC: 117 MG/DL (ref 70–130)
GLUCOSE BLDC GLUCOMTR-MCNC: 121 MG/DL (ref 70–130)

## 2023-11-29 PROCEDURE — 82948 REAGENT STRIP/BLOOD GLUCOSE: CPT

## 2023-11-29 PROCEDURE — 25010000002 CEFEPIME PER 500 MG: Performed by: INTERNAL MEDICINE

## 2023-11-29 PROCEDURE — 99232 SBSQ HOSP IP/OBS MODERATE 35: CPT | Performed by: CLINICAL NURSE SPECIALIST

## 2023-11-29 PROCEDURE — 94799 UNLISTED PULMONARY SVC/PX: CPT

## 2023-11-29 PROCEDURE — 25010000002 ENOXAPARIN PER 10 MG: Performed by: INTERNAL MEDICINE

## 2023-11-29 PROCEDURE — 25810000003 LACTATED RINGERS PER 1000 ML: Performed by: INTERNAL MEDICINE

## 2023-11-29 RX ADMIN — Medication 20 MG: at 04:49

## 2023-11-29 RX ADMIN — Medication 1 TABLET: at 09:25

## 2023-11-29 RX ADMIN — Medication 20 MG: at 21:17

## 2023-11-29 RX ADMIN — BACLOFEN 20 MG: 10 TABLET ORAL at 10:11

## 2023-11-29 RX ADMIN — ROSUVASTATIN CALCIUM 5 MG: 5 TABLET, FILM COATED ORAL at 10:11

## 2023-11-29 RX ADMIN — HYDROXYZINE HYDROCHLORIDE 25 MG: 25 TABLET ORAL at 17:15

## 2023-11-29 RX ADMIN — Medication 10 ML: at 21:57

## 2023-11-29 RX ADMIN — HYDROXYZINE HYDROCHLORIDE 25 MG: 25 TABLET ORAL at 04:50

## 2023-11-29 RX ADMIN — Medication 20 MG: at 17:16

## 2023-11-29 RX ADMIN — HYDROCODONE BITARTRATE AND ACETAMINOPHEN 1 TABLET: 5; 325 TABLET ORAL at 04:49

## 2023-11-29 RX ADMIN — ENOXAPARIN SODIUM 40 MG: 100 INJECTION SUBCUTANEOUS at 14:42

## 2023-11-29 RX ADMIN — POLYETHYLENE GLYCOL 3350 17 G: 17 POWDER, FOR SOLUTION ORAL at 09:23

## 2023-11-29 RX ADMIN — CEFEPIME 2000 MG: 2 INJECTION, POWDER, FOR SOLUTION INTRAVENOUS at 12:51

## 2023-11-29 RX ADMIN — CEFEPIME 2000 MG: 2 INJECTION, POWDER, FOR SOLUTION INTRAVENOUS at 21:17

## 2023-11-29 RX ADMIN — HYDROXYZINE HYDROCHLORIDE 25 MG: 25 TABLET ORAL at 21:17

## 2023-11-29 RX ADMIN — SODIUM CHLORIDE, POTASSIUM CHLORIDE, SODIUM LACTATE AND CALCIUM CHLORIDE 50 ML/HR: 600; 310; 30; 20 INJECTION, SOLUTION INTRAVENOUS at 21:17

## 2023-11-29 RX ADMIN — Medication 10 ML: at 09:24

## 2023-11-29 RX ADMIN — BACLOFEN 20 MG: 10 TABLET ORAL at 05:04

## 2023-11-29 RX ADMIN — LANSOPRAZOLE 15 MG: 15 TABLET, ORALLY DISINTEGRATING ORAL at 05:04

## 2023-11-29 RX ADMIN — CEFEPIME 2000 MG: 2 INJECTION, POWDER, FOR SOLUTION INTRAVENOUS at 04:49

## 2023-11-29 RX ADMIN — HYDROCODONE BITARTRATE AND ACETAMINOPHEN 1 TABLET: 5; 325 TABLET ORAL at 21:17

## 2023-11-29 RX ADMIN — CHLORHEXIDINE GLUCONATE 15 ML: 1.2 SOLUTION ORAL at 09:25

## 2023-11-29 RX ADMIN — Medication 20 MG: at 10:12

## 2023-11-29 RX ADMIN — HYDROXYZINE HYDROCHLORIDE 25 MG: 25 TABLET ORAL at 12:51

## 2023-11-29 RX ADMIN — BACLOFEN 20 MG: 10 TABLET ORAL at 17:15

## 2023-11-29 RX ADMIN — BACLOFEN 20 MG: 10 TABLET ORAL at 21:17

## 2023-11-29 RX ADMIN — BISACODYL 10 MG: 10 SUPPOSITORY RECTAL at 09:29

## 2023-11-29 NOTE — CASE MANAGEMENT/SOCIAL WORK
Continued Stay Note  EULOGIO Bhakta     Patient Name: Namita Zabala  MRN: [id number]  Today's Date: 11/29/2023    Admit Date: 11/25/2023    Plan: [facility]   Discharge Plan       Row Name 11/29/23 1355       Plan    Plan [facility]    Plan Comments Left a message for pt's spouse Grant [phone] to discuss other snfs if he is wanting to discuss. At this time pt will return to [facility].                   Discharge Codes    No documentation.                       ORAL Mcintyre

## 2023-11-29 NOTE — PLAN OF CARE
Goal Outcome Evaluation:  Plan of Care Reviewed With: patient           Outcome Evaluation: Pt in unable to speak, unable to assess orientation, Pemoral PICC is clean dry intact and infusing, stratton cath in place and draining, accuchecks are appropriate, vitals are stable, will cont to monitor

## 2023-11-29 NOTE — PROGRESS NOTES
[facility] Palliative Care Services    Palliative Care Daily Progress Note   Chief complaint-follow up support for patient/family    Code Status:   Code Status and Medical Interventions:   Ordered at: 11/27/23 1357     Medical Intervention Limits:    NO intubation (DNI)    Other    NO NIPPV (Non-Invasive Positive Pressure Ventilation)     Level Of Support Discussed With:    Next of Kin (If No Surrogate)     Code Status (Patient has no pulse and is not breathing):    No CPR (Do Not Attempt to Resuscitate)     Medical Interventions (Patient has pulse or is breathing):    Limited Support     Comments:    per spouseGrant     Additional Medical Interventions Limits:    No ventilator/NIPPV      Advanced Directives: Advance Directive Status: Patient does not have advance directive   Goals of Care: Ongoing.     S: Medical record reviewed. Events noted.  Transferred out of unit to medical floor yesterday.  Labs remained stable.  A MOST document has been completed and scanned to EMR.  Laying in bed without apparent distress.  Grimaces with any stimulus.  No family at bedside.  Remains on trach collar with supplemental  oxygen.  Anticipating back to SNF soon.  At 11:57 AM, call placed to spouseGrant, no answer, left voicemail and awaiting callback.     Review of Systems   Unable to perform ROS: Acuity of condition     Pain Assessment  CPOT and PAINAD Scales: CPOT (Critical-Care Pain Observation Tool)  CPOT Facial Expression: 0-->relaxed, neutral  CPOT Body Movements: 0-->absence of movements  CPOT Muscle Tension: 0-->relaxed  Ventilator Compliance/Vocalization: 0-->talking in normal tone or no sound  CPOT Score: 0    O:     Intake/Output Summary (Last 24 hours) at 11/29/2023 0832  Last data filed at 11/29/2023 0500  Gross per 24 hour   Intake 1914.9 ml   Output 2150 ml   Net -235.1 ml       Diagnostics and current medications: Reviewed.      Current Facility-Administered Medications:     acetaminophen  (TYLENOL) tablet 500 mg, 500 mg, Per G Tube, Q4H PRN, Arsalan Delarosa MD    baclofen (LIORESAL) tablet 20 mg, 20 mg, Per G Tube, Q6H, Arsalan Delarosa MD, 20 mg at 11/29/23 0504    bisacodyl (DULCOLAX) suppository 10 mg, 10 mg, Rectal, Q48H PRN, Arsalan Delarosa MD, 10 mg at 11/26/23 1132    bisacodyl (DULCOLAX) suppository 10 mg, 10 mg, Rectal, Daily, Arsalan Delarosa MD, 10 mg at 11/28/23 0948    cefepime 2 gm IVPB in 100 ml NS (MBP), 2,000 mg, Intravenous, Q8H, Arsalan Delarosa MD, 2,000 mg at 11/29/23 0449    chlorhexidine (PERIDEX) 0.12 % solution 15 mL, 15 mL, Mouth/Throat, BID, Arsalan Delarosa MD, 15 mL at 11/28/23 2226    Enoxaparin Sodium (LOVENOX) syringe 40 mg, 40 mg, Subcutaneous, Q24H, Arsalan Delarosa MD, 40 mg at 11/28/23 1328    HYDROcodone-acetaminophen (NORCO) 5-325 MG per tablet 1 tablet, 1 tablet, Oral, Q6H PRN, Arsalan Delarosa MD, 1 tablet at 11/29/23 0449    hydrOXYzine (ATARAX) tablet 25 mg, 25 mg, Per G Tube, Q6H, Arsalan Delarosa MD, 25 mg at 11/29/23 0450    ipratropium-albuterol (DUO-NEB) nebulizer solution 3 mL, 3 mL, Nebulization, Q4H PRN, Arsalan Delarosa MD    lactated ringers infusion, 50 mL/hr, Intravenous, Continuous, Arsalan Delarosa MD, Last Rate: 50 mL/hr at 11/28/23 2225, 50 mL/hr at 11/28/23 2225    lansoprazole (PREVACID SOLUTAB) disintegrating tablet Tablet Delayed Release Dispersible 15 mg, 15 mg, Per G Tube, Q AM, Arsalan Delarosa MD, 15 mg at 11/29/23 0504    multivitamin with minerals 1 tablet, 1 tablet, Oral, Daily, Arsalan Delarosa MD, 1 tablet at 11/28/23 0948    ondansetron (ZOFRAN) 4 MG/5ML oral solution 4 mg, 4 mg, Oral, Q6H PRN, Arsalan Delarosa MD    ondansetron (ZOFRAN) injection 4 mg, 4 mg, Intravenous, Q6H PRN, Arsalan Delarosa MD    polyethylene glycol (MIRALAX) packet 17 g, 17 g, Oral, Daily, Arsalan Delarosa MD, 17 g at 11/28/23 0948    rosuvastatin (CRESTOR) tablet 5 mg, 5  "mg, Per G Tube, Daily, Arsalan Delarosa MD, 5 mg at 11/28/23 0948    saccharomyces boulardii (FLORASTOR) capsule 250 mg, 250 mg, Per G Tube, BID, Arsalan Delarosa MD, 250 mg at 11/28/23 0948    simethicone (MYLICON) 40 MG/0.6ML drops 20 mg, 20 mg, Per G Tube, Q6H, Arsalan Delarosa MD, 20 mg at 11/29/23 0449    sodium chloride 0.9 % flush 10 mL, 10 mL, Intravenous, Q12H, Arsalan Delarosa MD, 10 mL at 11/28/23 2226    sodium chloride 0.9 % flush 10 mL, 10 mL, Intravenous, Q12H, Arsalan Delarosa MD, 10 mL at 11/28/23 2226    sodium chloride 0.9 % flush 10 mL, 10 mL, Intravenous, Q12H, Arsalan Delarosa MD, 10 mL at 11/28/23 2226    sodium chloride 0.9 % flush 10 mL, 10 mL, Intravenous, PRN, Arsalan Delarosa MD    sodium chloride 0.9 % flush 20 mL, 20 mL, Intravenous, PRN, Arsalan Delarosa MD    sodium chloride 0.9 % infusion 40 mL, 40 mL, Intravenous, PRN, Arsalan Delarosa MD    zinc oxide 20 % ointment, , Topical, Q4H PRN, Arsalan Delarosa MD    Allergies   Allergen Reactions    Coconut Unknown - High Severity    Levaquin [Levofloxacin] Other (See Comments)     unknown    Nuts Unknown - High Severity    Penicillins     [place] Other (See Comments)     Causes migraines per pt reports     I have utilized all available immediate resources to obtain, update, or review the patient's current medications (including all prescriptions, over-the-counter products, herbals, cannabis/cannabidiol products, and vitamin/mineral/dietary (nutritional) supplements) for name, route of administration, type, dose and frequency.    A:    /70 (BP Location: Right arm, Patient Position: Lying)   Pulse 77   Temp 97.9 °F (36.6 °C)   Resp 16   Ht 152.4 cm (60\")   Wt 67.9 kg (149 lb 12.8 oz)   LMP  (LMP Unknown)   SpO2 100%   BMI 29.26 kg/m²     Physical Exam  Vitals and nursing note reviewed.   Constitutional:       Comments: Trach collar, Left nephrostomy tube   HENT:      Head: " Normocephalic.   Neck:      Trachea: Tracheostomy present.   Abdominal:      Comments: PEG   Genitourinary:     Comments: Mojica catheter  Musculoskeletal:      Cervical back: Torticollis present.      Comments: Contractures upper/lower extremities   Skin:     General: Skin is warm.   Psychiatric:      Comments: nonverbal     Patient status: Disease state: Controlled with current treatments.  Current Functional status: Palliative Performance Scale Score: Performance 10% based on the following measures: Ambulation: Totally bed bound, Activity and Evidence of Disease: Unable to do any work, extensive evidence of disease, Self-Care: Total care required,  Intake: Mouth care only, LOC: Drowsy or comatose   Baseline Functional status: Palliative Performance Scale Score: Performance 10% based on the following measures: Ambulation: Totally bed bound, Activity and Evidence of Disease: Unable to do any work, extensive evidence of disease, Self-Care: Total care required,  Intake: Mouth care only, LOC: Drowsy or comatose   Nutritional status: Albumin 3.3 Body mass index is 25.51 kg/m².      Hospital Problem List      Sepsis    Acute respiratory failure with hypoxia    Sepsis secondary to UTI    MVP (mitral valve prolapse)    Myofascial pain syndrome    Severe malnutrition    Functional quadriplegia    Tracheostomy present    PEG tube malfunction     Impression/Problem List:     Sepsis due to UTI  Acute on chronic respiratory failure with hypoxia  History of anoxic brain injury  History of retroperitoneal hematoma and hematoma with abscess leading to necrotizing infection of the pelvis and bladder rupture s/p nephroureteral stent placement,  Tracheostomy present  Functional quadriplegia  Hypertension  Hypoalbuminemia  Bilateral nephrolithiasis, left-sided nephrostomy tube in place  Chronic pulmonary embolism  Iron deficiency anemia  Migraine  Mitral valve prolapse  Dysphagia s/p PEG  Contractures  Debility      "  Recommendations/Plan:  1. plan: Goals of care include CODE STATUS no CPR/limited support interventions per attending.     Family support: The patient receives support from her ..  Advance Directives: Advance Directive Status: Patient does not have advance directive   POA/Healthcare surrogate-spouse, Grant-next of kin.     2.  Palliative care encounter  - Prognosis is poor long-term secondary to sepsis,  history of anoxic brain injury, tracheostomy, quadriplegia, multiple comorbidities, contractures, and debility.     -Resident of [facility]     -Last seen by palliative services in October 2023 at which time patient was transferred to tertiary facility for higher level of care as recommended by urology secondary to complicated UTI and felt likely needed nephroureteral tube replacement-last placed in July 2023 by  in addition to PEG tube replacement (completed 10/18), unable to speak with family during hospitalization.     -Started on IV antibiotics and bowel regimen.  Required vasopressor support initially and these have been weaned off.  Started on tube feeds and free water replacement.  Remains on trach collar.  1 of 4 bottles of blood cultures positive for coagulase negative staph likely contaminant.    11/27-Anticipate Mojica catheter change, previously followed by urology secondary to difficult placement.     11/27-spoke with spouse, Grant via telephone.  Discussed poor long-term prognosis secondary to multiple factors as above.  High risk for rehospitalizations and continued decline.  Spouse seems to have good prognostic awareness \"I just pray everyday that she will not suffer\".  Clarified CODE STATUS and will plan to complete a MOST document to include no CPR/limited support interventions, no intubation, no ventilator support, no NIPPV.  Spouse to sign upon his arrival when he visits.  Document will be in patient's paper chart. Nurse notified electronically.      11/29-Continue " supportive care  -MOST document completed, scanned to EMR.  -Case discussed with Dr. Delarosa.  -Anticipate SNF as prior at discharge.           Thank you for allowing us to participate in patient's plan of care. Palliative Care Team will continue to follow patient.     Roxane Sahni, APRN  11/29/2023  08:32 CST

## 2023-11-29 NOTE — PLAN OF CARE
Goal Outcome Evaluation:  Plan of Care Reviewed With: spouse           Outcome Evaluation: Transferred to room 371. Report called to Ankita MATA RN.  at bedside.

## 2023-11-29 NOTE — PROGRESS NOTES
[facility] Medicine Services  INPATIENT PROGRESS NOTE    Patient Name: Namita Zabala  Date of Admission: 11/25/2023  Today's Date: 11/29/23  Length of Stay: 4  Primary Care Physician: David Lara MD    Subjective   Chief Complaint: follow-up sepsis  HPI   Patient unable to provide any additional history.  Discussed with bedside nurse, Reina.  No acute events from overnight.  Scant output has been noted from her nephrostomy tube.  She has had adequate output from her Stratton catheter.  No other acute events from overnight.  No family at bedside.  Tolerating tube feeds.  Flowsheet indicates that patient has had a bowel movement within the past 24 hours.    Review of Systems   All pertinent negatives and positives are as above. All other systems have been reviewed and are negative unless otherwise stated.     Objective    Temp:  [97.9 °F (36.6 °C)-99.2 °F (37.3 °C)] 97.9 °F (36.6 °C)  Heart Rate:  [63-98] 77  Resp:  [16-20] 16  BP: (107-145)/(62-97) 118/70  Physical Exam  Vitals reviewed.   Constitutional:       Appearance: She is not toxic-appearing.   HENT:      Head: Normocephalic.      Mouth/Throat:      Mouth: Mucous membranes are moist.   Eyes:      Pupils: Pupils are equal, round, and reactive to light.   Neck:      Comments: Trach and trach collar in place  Cardiovascular:      Rate and Rhythm: Normal rate.   Pulmonary:      Effort: Pulmonary effort is normal. No respiratory distress.      Comments: Diminished at bases; trach collar at 6L/28% Fi02  Genitourinary:     Comments: New stratton in place; left nephrostomy tube in place with scant output  Musculoskeletal:      Comments: Significant contractures notes; left thigh PICC in place   Skin:     General: Skin is warm.   Neurological:      Mental Status: She is alert. Mental status is at baseline.      Motor: Weakness present.      Comments: Patient unable to provide any additional history         Results  Review:  I have reviewed the labs, radiology results, and diagnostic studies.    Laboratory Data:   Results from last 7 days   Lab Units 11/27/23  0953 11/26/23  0333 11/25/23  1800   WBC 10*3/mm3 7.72 12.79* 10.63   HEMOGLOBIN g/dL 10.4* 12.0 12.9   HEMATOCRIT % 32.4* 39.7 41.8   PLATELETS 10*3/mm3 178 265 255        Results from last 7 days   Lab Units 11/28/23  1027 11/27/23  0748 11/26/23  0333 11/25/23  1800   SODIUM mmol/L 143 146* 152* 157*   POTASSIUM mmol/L 4.3 3.9 4.1 4.0   CHLORIDE mmol/L 110* 112* 115* 115*   CO2 mmol/L 26.0 23.0 29.0 31.0*   BUN mg/dL 17 26* 41* 48*   CREATININE mg/dL 0.28* 0.31* 0.52* 0.62   CALCIUM mg/dL 8.9 9.0 9.8 10.5   BILIRUBIN mg/dL  --  1.0 1.1 0.7   ALK PHOS U/L  --  54 58 64   ALT (SGPT) U/L  --  14 19 23   AST (SGOT) U/L  --  15 15 17   GLUCOSE mg/dL 131* 125* 101* 159*       Culture Data:   Blood Culture   Date Value Ref Range Status   11/25/2023 No growth at 24 hours  Preliminary   11/25/2023 Staphylococcus, coagulase negative (C)  Final       Radiology Data:   Imaging Results (Last 24 Hours)       ** No results found for the last 24 hours. **            I have reviewed the patient's current medications.     Assessment/Plan   Assessment  Active Hospital Problems    Diagnosis     **Sepsis     Functional quadriplegia     Tracheostomy present     PEG tube malfunction     Severe malnutrition     Acute respiratory failure with hypoxia     Sepsis secondary to UTI     MVP (mitral valve prolapse)     Myofascial pain syndrome        Treatment Plan  IV Cefepime - day 5.    Called microbiology this AM as results are still not available.  It sounds like there are 2 different gram-negative rods and they are anticipating final ID and susceptibilities today.  Patient appears to be making improvement on cefepime but will be mindful the patient has had infection with ESBL organism in the past.   1/4 bottle on blood cultures positive for coagulase-negative staph-likely contaminant; patient  has also been on vancomycin.  Vancomycin trough returned at 27.1.  Vancomycin recently DC'd and plan to monitor off of this antibiotic   Lab holiday this AM.  Plan to repeat CBC and metabolic panel in the morning tomorrow  Currently on trach collar at 28%  at 8 L; wean as tolerated.  I turned her down to 6 L during my assessment.  Would like to clarify with family or the facility where she resides if patient is on any supplemental oxygen in the outpatient setting  CXR on admission with no acute infiltrates  IV fluids with lactated Ringer's  TFs for nutrition with free water replacement  Mojica changed 11/27  Urology recommendations reviewed and appreciate Dr. Bailon assistance -patient will need follow-up at tertiary care facility for ongoing urology management with interventional radiology capability regarding her left percutaneous nephrostomy tube.  Monitor output from nephrostomy  Lovenox PPx  Bowel regimen  Palliative Care following    Medical Decision Making  Number and Complexity of problems: High complexity; patient presented with sepsis likely secondary to urinary tract infection, previous history of ESBL infection, dehydration and intravascular volume depletion with evidence of hypernatremia, in addition to hypotension requiring vasopressors with Levophed for blood pressure support.  Multiple chronic comorbidities including bedbound state, reported history of anoxic brain injury (details unknown), chronic tracheostomy and PEG tube placement; also with chronic left nephrostomy tube      Conditions and Status        Condition is improving.     [facility] Data  External documents reviewed: none  Cardiac tracing (EKG, telemetry) interpretation: sinus rhythm in the 90s on telemetry monitor this morning  Radiology interpretation: no new radiology studies  Labs reviewed: Labs still pending this morning  Any tests that were considered but not ordered: None at this time     Decision rules/scores evaluated (example  [id number], Wells, etc): None at this time     Discussed with: Bedside nurse Reina     Care Planning  Shared decision making: Discussed with bedside nurse Migdalia.  No family at bedside this morning  Code status and discussions: DO NOT RESUSCITATE and DO NOT INTUBATE    Disposition  Social Determinants of Health that impact treatment or disposition: Patient is from [facility]  I expect the patient to be discharged to nursing home facility in 2-3 days    Electronically signed by Arsalan Delarosa MD, 11/29/23, 10:22 CST.

## 2023-11-29 NOTE — PLAN OF CARE
Goal Outcome Evaluation:              Outcome Evaluation: Patient nonverbal; unable to assess orientation. IVF/IV abx. Trach in place. Peg tube C/D/I. Continuous feedings; isosource 1.5. Neph tube C/D/I. Femoral PICC C/D/I. Mojica cath; adequate UOP. Turning Q2. Lovenox. Accuchecks. VSS. Safety maintained.

## 2023-11-29 NOTE — CASE MANAGEMENT/SOCIAL WORK
Continued Stay Note   [place]     Patient Name: Namita Zabala  MRN: [id number]  Today's Date: 11/29/2023    Admit Date: 11/25/2023    Plan: SNF   Discharge Plan       Row Name 11/29/23 4820       Plan    Plan SNF    Plan Comments Rec'd a call back from pt's spouse Grant [phone] and he requests a referral to [facility]. Referral being sent.      Row Name 11/29/23 5330       Plan    Plan [facility]    Plan Comments Left a message for pt's spouse Grant [phone] to discuss other snfs if he is wanting to discuss. At this time pt will return to [facility].                   Discharge Codes    No documentation.                 Expected Discharge Date and Time       Expected Discharge Date Expected Discharge Time    Nov 30, 2023               ORAL Mcintyre

## 2023-11-30 LAB
ANION GAP SERPL CALCULATED.3IONS-SCNC: 8 MMOL/L (ref 5–15)
BACTERIA SPEC AEROBE CULT: NORMAL
BUN SERPL-MCNC: 19 MG/DL (ref 6–20)
BUN/CREAT SERPL: 65.5 (ref 7–25)
CALCIUM SPEC-SCNC: 9.2 MG/DL (ref 8.6–10.5)
CHLORIDE SERPL-SCNC: 105 MMOL/L (ref 98–107)
CO2 SERPL-SCNC: 26 MMOL/L (ref 22–29)
CREAT SERPL-MCNC: 0.29 MG/DL (ref 0.57–1)
DEPRECATED RDW RBC AUTO: 44.1 FL (ref 37–54)
EGFRCR SERPLBLD CKD-EPI 2021: 133.8 ML/MIN/1.73
ERYTHROCYTE [DISTWIDTH] IN BLOOD BY AUTOMATED COUNT: 12.8 % (ref 12.3–15.4)
GLUCOSE BLDC GLUCOMTR-MCNC: 117 MG/DL (ref 70–130)
GLUCOSE BLDC GLUCOMTR-MCNC: 143 MG/DL (ref 70–130)
GLUCOSE SERPL-MCNC: 137 MG/DL (ref 65–99)
HCT VFR BLD AUTO: 32.1 % (ref 34–46.6)
HGB BLD-MCNC: 10.3 G/DL (ref 12–15.9)
MCH RBC QN AUTO: 30.6 PG (ref 26.6–33)
MCHC RBC AUTO-ENTMCNC: 32.1 G/DL (ref 31.5–35.7)
MCV RBC AUTO: 95.3 FL (ref 79–97)
PLATELET # BLD AUTO: 183 10*3/MM3 (ref 140–450)
PMV BLD AUTO: 11.7 FL (ref 6–12)
POTASSIUM SERPL-SCNC: 4.4 MMOL/L (ref 3.5–5.2)
RBC # BLD AUTO: 3.37 10*6/MM3 (ref 3.77–5.28)
SODIUM SERPL-SCNC: 139 MMOL/L (ref 136–145)
WBC NRBC COR # BLD AUTO: 7.52 10*3/MM3 (ref 3.4–10.8)

## 2023-11-30 PROCEDURE — 85027 COMPLETE CBC AUTOMATED: CPT | Performed by: INTERNAL MEDICINE

## 2023-11-30 PROCEDURE — 25010000002 ERTAPENEM PER 500 MG: Performed by: INTERNAL MEDICINE

## 2023-11-30 PROCEDURE — 94799 UNLISTED PULMONARY SVC/PX: CPT

## 2023-11-30 PROCEDURE — 94640 AIRWAY INHALATION TREATMENT: CPT

## 2023-11-30 PROCEDURE — 25010000002 ENOXAPARIN PER 10 MG: Performed by: INTERNAL MEDICINE

## 2023-11-30 PROCEDURE — 25010000002 CEFEPIME PER 500 MG: Performed by: INTERNAL MEDICINE

## 2023-11-30 PROCEDURE — 82948 REAGENT STRIP/BLOOD GLUCOSE: CPT

## 2023-11-30 PROCEDURE — 80048 BASIC METABOLIC PNL TOTAL CA: CPT | Performed by: INTERNAL MEDICINE

## 2023-11-30 RX ADMIN — ROSUVASTATIN CALCIUM 5 MG: 5 TABLET, FILM COATED ORAL at 09:31

## 2023-11-30 RX ADMIN — Medication 10 ML: at 09:31

## 2023-11-30 RX ADMIN — HYDROXYZINE HYDROCHLORIDE 25 MG: 25 TABLET ORAL at 11:31

## 2023-11-30 RX ADMIN — HYDROCODONE BITARTRATE AND ACETAMINOPHEN 1 TABLET: 5; 325 TABLET ORAL at 05:18

## 2023-11-30 RX ADMIN — ENOXAPARIN SODIUM 40 MG: 100 INJECTION SUBCUTANEOUS at 13:52

## 2023-11-30 RX ADMIN — Medication 1 TABLET: at 09:31

## 2023-11-30 RX ADMIN — BACLOFEN 20 MG: 10 TABLET ORAL at 11:31

## 2023-11-30 RX ADMIN — BACLOFEN 20 MG: 10 TABLET ORAL at 17:41

## 2023-11-30 RX ADMIN — HYDROCODONE BITARTRATE AND ACETAMINOPHEN 1 TABLET: 5; 325 TABLET ORAL at 11:31

## 2023-11-30 RX ADMIN — Medication 10 ML: at 20:59

## 2023-11-30 RX ADMIN — Medication 20 MG: at 17:41

## 2023-11-30 RX ADMIN — IPRATROPIUM BROMIDE AND ALBUTEROL SULFATE 3 ML: .5; 3 SOLUTION RESPIRATORY (INHALATION) at 14:05

## 2023-11-30 RX ADMIN — CHLORHEXIDINE GLUCONATE 15 ML: 1.2 SOLUTION ORAL at 09:31

## 2023-11-30 RX ADMIN — Medication 20 MG: at 11:31

## 2023-11-30 RX ADMIN — HYDROXYZINE HYDROCHLORIDE 25 MG: 25 TABLET ORAL at 05:17

## 2023-11-30 RX ADMIN — ERTAPENEM 1000 MG: 1 INJECTION INTRAMUSCULAR; INTRAVENOUS at 11:59

## 2023-11-30 RX ADMIN — HYDROXYZINE HYDROCHLORIDE 25 MG: 25 TABLET ORAL at 17:41

## 2023-11-30 RX ADMIN — CEFEPIME 2000 MG: 2 INJECTION, POWDER, FOR SOLUTION INTRAVENOUS at 05:18

## 2023-11-30 RX ADMIN — LANSOPRAZOLE 15 MG: 15 TABLET, ORALLY DISINTEGRATING ORAL at 05:18

## 2023-11-30 RX ADMIN — HYDROCODONE BITARTRATE AND ACETAMINOPHEN 1 TABLET: 5; 325 TABLET ORAL at 17:51

## 2023-11-30 RX ADMIN — Medication 10 ML: at 21:00

## 2023-11-30 RX ADMIN — BACLOFEN 20 MG: 10 TABLET ORAL at 05:18

## 2023-11-30 RX ADMIN — Medication 20 MG: at 05:18

## 2023-11-30 NOTE — PLAN OF CARE
Problem: Palliative Care  Goal: Enhanced Quality of Life  Outcome: Ongoing, Progressing    Patient is awake. VSS. 8L. She does not appear to be in any distress or discomfort. Chart reviewed and events noted. Awaiting acceptance to SNF. MOST has been completed.     Will continue to follow until discharge.    SCOTT Krause  APHSW-C   11/30/2023

## 2023-11-30 NOTE — CASE MANAGEMENT/SOCIAL WORK
Continued Stay Note   [place]     Patient Name: Namita Zabala  MRN: [id number]  Today's Date: 11/30/2023    Admit Date: 11/25/2023    Plan: SNF   Discharge Plan       Row Name 11/30/23 1505       Plan    Plan SNF    Plan Comments Pt still has a bed at [facility]. Have informed Awilda newberry [phone] that pt will need daily iv invanz for 7 days total. Referral was sent to [facility] yesterday as well and they are still reviewing. Plan is for pt to d/c tomorrow.                   Discharge Codes    No documentation.                 Expected Discharge Date and Time       Expected Discharge Date Expected Discharge Time    Dec 1, 2023               ORAL Mcintyre

## 2023-11-30 NOTE — PLAN OF CARE
Goal Outcome Evaluation:              Outcome Evaluation: Patient unable to speak; will blink eyes when asked a question. All lines/drains C/D/I. Tube feeds. Mojica; adequate UOP. Accuchecks. Lovenox. VSS. Safety maintained.

## 2023-11-30 NOTE — PROGRESS NOTES
[facility] Palliative Care Services    Palliative Care Daily Progress Note   Chief complaint-chart review    Code Status:   Code Status and Medical Interventions:   Ordered at: 11/27/23 1357     Medical Intervention Limits:    NO intubation (DNI)    Other    NO NIPPV (Non-Invasive Positive Pressure Ventilation)     Level Of Support Discussed With:    Next of Kin (If No Surrogate)     Code Status (Patient has no pulse and is not breathing):    No CPR (Do Not Attempt to Resuscitate)     Medical Interventions (Patient has pulse or is breathing):    Limited Support     Comments:    per spouse, Grant     Additional Medical Interventions Limits:    No ventilator/NIPPV      Advanced Directives: Advance Directive Status: Patient does not have advance directive   Goals of Care: Ongoing.     S: Medical record reviewed. Events noted.  Labs stable.  A MOST document has been completed this hospitalization.  Anticipating SNF at discharge.  A referral has been placed to [facility] per SW.       Pain Assessment  CPOT and PAINAD Scales: CPOT (Critical-Care Pain Observation Tool)  CPOT Facial Expression: 0-->relaxed, neutral  CPOT Body Movements: 0-->absence of movements  CPOT Muscle Tension: 0-->relaxed  Ventilator Compliance/Vocalization: 0-->talking in normal tone or no sound  CPOT Score: 0    O:     Intake/Output Summary (Last 24 hours) at 11/30/2023 1030  Last data filed at 11/30/2023 0900  Gross per 24 hour   Intake 2931 ml   Output 2000 ml   Net 931 ml       Diagnostics and current medications: Reviewed.      Current Facility-Administered Medications:     acetaminophen (TYLENOL) tablet 500 mg, 500 mg, Per G Tube, Q4H PRN, Arsalan Delarosa MD    baclofen (LIORESAL) tablet 20 mg, 20 mg, Per G Tube, Q6H, Arsalan Delarosa MD, 20 mg at 11/30/23 0518    bisacodyl (DULCOLAX) suppository 10 mg, 10 mg, Rectal, Q48H PRN, Arsalan Delarosa MD, 10 mg at 11/26/23 1132    bisacodyl (DULCOLAX) suppository 10  mg, 10 mg, Rectal, Daily, Arsalan Delarosa MD, 10 mg at 11/29/23 0929    chlorhexidine (PERIDEX) 0.12 % solution 15 mL, 15 mL, Mouth/Throat, BID, Arsalan Delarosa MD, 15 mL at 11/30/23 0931    Enoxaparin Sodium (LOVENOX) syringe 40 mg, 40 mg, Subcutaneous, Q24H, Arsalan Delarosa MD, 40 mg at 11/29/23 1442    HYDROcodone-acetaminophen (NORCO) 5-325 MG per tablet 1 tablet, 1 tablet, Oral, Q6H PRN, Arsalan Delarosa MD, 1 tablet at 11/30/23 0518    hydrOXYzine (ATARAX) tablet 25 mg, 25 mg, Per G Tube, Q6H, Arsalan Delarosa MD, 25 mg at 11/30/23 0517    ipratropium-albuterol (DUO-NEB) nebulizer solution 3 mL, 3 mL, Nebulization, Q4H PRN, Arsalan Delarosa MD    lactated ringers infusion, 50 mL/hr, Intravenous, Continuous, Arsalan Delarosa MD, Last Rate: 50 mL/hr at 11/29/23 2117, 50 mL/hr at 11/29/23 2117    lansoprazole (PREVACID SOLUTAB) disintegrating tablet Tablet Delayed Release Dispersible 15 mg, 15 mg, Per G Tube, Q AM, Arsalan Delarosa MD, 15 mg at 11/30/23 0518    multivitamin with minerals 1 tablet, 1 tablet, Oral, Daily, Arsalan Delarosa MD, 1 tablet at 11/30/23 0931    ondansetron (ZOFRAN) 4 MG/5ML oral solution 4 mg, 4 mg, Oral, Q6H PRN, Arsalan Delarosa MD    ondansetron (ZOFRAN) injection 4 mg, 4 mg, Intravenous, Q6H PRN, Arsalan Delarosa MD    polyethylene glycol (MIRALAX) packet 17 g, 17 g, Oral, Daily, Arsalan Delarosa MD, 17 g at 11/29/23 0923    rosuvastatin (CRESTOR) tablet 5 mg, 5 mg, Per G Tube, Daily, Arsalan Delarosa MD, 5 mg at 11/30/23 0931    simethicone (MYLICON) 40 MG/0.6ML drops 20 mg, 20 mg, Per G Tube, Q6H, Arsalan Delarosa MD, 20 mg at 11/30/23 0518    sodium chloride 0.9 % flush 10 mL, 10 mL, Intravenous, Q12H, Arsalan Delarosa MD, 10 mL at 11/30/23 0931    sodium chloride 0.9 % flush 10 mL, 10 mL, Intravenous, Q12H, Arsalan Delarosa MD, 10 mL at 11/30/23 0931    sodium chloride 0.9 % flush 10 mL, 10 mL,  "Intravenous, Q12H, Arsalan Delarosa MD, 10 mL at 11/30/23 0931    sodium chloride 0.9 % flush 10 mL, 10 mL, Intravenous, PRN, Arsalan Delarosa MD    sodium chloride 0.9 % flush 20 mL, 20 mL, Intravenous, PRN, Arsalan Delarosa MD    sodium chloride 0.9 % infusion 40 mL, 40 mL, Intravenous, PRN, Arsalan Delarosa MD    zinc oxide 20 % ointment, , Topical, Q4H PRNWashington Benjamin H, MD    Allergies   Allergen Reactions    Coconut Unknown - High Severity    Levaquin [Levofloxacin] Other (See Comments)     unknown    Nuts Unknown - High Severity    Penicillins     [place] Other (See Comments)     Causes migraines per pt reports     I have utilized all available immediate resources to obtain, update, or review the patient's current medications (including all prescriptions, over-the-counter products, herbals, cannabis/cannabidiol products, and vitamin/mineral/dietary (nutritional) supplements) for name, route of administration, type, dose and frequency.    A:    /79 (BP Location: Right leg, Patient Position: Lying)   Pulse 104   Temp 98.4 °F (36.9 °C)   Resp 16   Ht 152.4 cm (60\")   Wt 67.9 kg (149 lb 12.8 oz)   LMP  (LMP Unknown)   SpO2 100%   BMI 29.26 kg/m²     Patient status: Disease state: Controlled with current treatments.  Current Functional status: Palliative Performance Scale Score: Performance 10% based on the following measures: Ambulation: Totally bed bound, Activity and Evidence of Disease: Unable to do any work, extensive evidence of disease, Self-Care: Total care required,  Intake: Mouth care only, LOC: Drowsy or comatose   Baseline Functional status: Palliative Performance Scale Score: Performance 10% based on the following measures: Ambulation: Totally bed bound, Activity and Evidence of Disease: Unable to do any work, extensive evidence of disease, Self-Care: Total care required,  Intake: Mouth care only, LOC: Drowsy or comatose   Nutritional status: Albumin 3.3 Body " mass index is 25.51 kg/m².      Hospital Problem List      Sepsis    Acute respiratory failure with hypoxia    Sepsis secondary to UTI    MVP (mitral valve prolapse)    Myofascial pain syndrome    Severe malnutrition    Functional quadriplegia    Tracheostomy present    PEG tube malfunction     Impression/Problem List:     Sepsis due to UTI  Acute on chronic respiratory failure with hypoxia  History of anoxic brain injury  History of retroperitoneal hematoma and hematoma with abscess leading to necrotizing infection of the pelvis and bladder rupture s/p nephroureteral stent placement,  Tracheostomy present  Functional quadriplegia  Hypertension  Hypoalbuminemia  Bilateral nephrolithiasis, left-sided nephrostomy tube in place  Chronic pulmonary embolism  Iron deficiency anemia  Migraine  Mitral valve prolapse  Dysphagia s/p PEG  Contractures  Debility       Recommendations/Plan:  1. plan: Goals of care include CODE STATUS no CPR/limited support interventions per attending.     Family support: The patient receives support from her ..  Advance Directives: Advance Directive Status: Patient does not have advance directive   POA/Healthcare surrogate-spouse, Grant-next of kin.     2.  Palliative care encounter  - Prognosis is poor long-term secondary to sepsis,  history of anoxic brain injury, tracheostomy, quadriplegia, multiple comorbidities, contractures, and debility.     -Resident of [facility]     -Last seen by palliative services in October 2023 at which time patient was transferred to tertiary facility for higher level of care as recommended by urology secondary to complicated UTI and felt likely needed nephroureteral tube replacement-last placed in July 2023 by  in addition to PEG tube replacement (completed 10/18), unable to speak with family during hospitalization.     -Started on IV antibiotics and bowel regimen.  Required vasopressor support initially and these have been weaned off.   "Started on tube feeds and free water replacement.  Remains on trach collar.  1 of 4 bottles of blood cultures positive for coagulase negative staph likely contaminant.    11/27-Anticipate Mojica catheter change, previously followed by urology secondary to difficult placement.     11/27-spoke with spouse, Grant via telephone.  Discussed poor long-term prognosis secondary to multiple factors as above.  High risk for rehospitalizations and continued decline.  Spouse seems to have good prognostic awareness \"I just pray everyday that she will not suffer\".  Clarified CODE STATUS and will plan to complete a MOST document to include no CPR/limited support interventions, no intubation, no ventilator support, no NIPPV.  Spouse to sign upon his arrival when he visits.  Document will be in patient's paper chart. Nurse notified electronically.      11/30-Continue supportive care  -MOST document completed  -Anticipate SNF as prior at discharge, a new referral has been placed to [facility].           Thank you for allowing us to participate in patient's plan of care.   Palliative Care Team will follow patient as needed.     Roxane Sahni, BERT  11/30/2023  10:30 CST   "

## 2023-11-30 NOTE — PROGRESS NOTES
[facility] Medicine Services  INPATIENT PROGRESS NOTE    Patient Name: Namita Zabala  Date of Admission: 11/25/2023  Today's Date: 11/30/23  Length of Stay: 5  Primary Care Physician: David Lara MD    Subjective   Chief Complaint: follow-up sepsis  HPI   Patient unable to provide any additional history.  No acute events from overnight.  No family at bedside.  Tolerating tube feeds.  Urine culture results have finally returned confirming an ESBL organism.    Review of Systems   All pertinent negatives and positives are as above. All other systems have been reviewed and are negative unless otherwise stated.     Objective    Temp:  [97.6 °F (36.4 °C)-99 °F (37.2 °C)] 98.4 °F (36.9 °C)  Heart Rate:  [] 106  Resp:  [16] 16  BP: (123-143)/(69-82) 134/79  Physical Exam  Vitals reviewed.   Constitutional:       Appearance: She is not toxic-appearing.   HENT:      Head: Normocephalic.      Mouth/Throat:      Mouth: Mucous membranes are moist.   Eyes:      Pupils: Pupils are equal, round, and reactive to light.   Neck:      Comments: Trach and trach collar in place  Cardiovascular:      Rate and Rhythm: Normal rate.   Pulmonary:      Effort: Pulmonary effort is normal. No respiratory distress.      Comments: Trach collar in place  Genitourinary:     Comments: Mojica and left nephrostomy tube  Musculoskeletal:      Comments: Significant contractures notes; left thigh PICC in place   Skin:     General: Skin is warm.   Neurological:      Mental Status: She is alert. Mental status is at baseline.      Motor: Weakness present.      Comments: Patient unable to provide any additional history         Results Review:  I have reviewed the labs, radiology results, and diagnostic studies.    Laboratory Data:   Results from last 7 days   Lab Units 11/30/23  0920 11/27/23  0953 11/26/23  0333   WBC 10*3/mm3 7.52 7.72 12.79*   HEMOGLOBIN g/dL 10.3* 10.4* 12.0   HEMATOCRIT % 32.1*  32.4* 39.7   PLATELETS 10*3/mm3 183 178 265        Results from last 7 days   Lab Units 11/30/23  0920 11/28/23  1027 11/27/23  0748 11/26/23  0333 11/25/23  1800   SODIUM mmol/L 139 143 146* 152* 157*   POTASSIUM mmol/L 4.4 4.3 3.9 4.1 4.0   CHLORIDE mmol/L 105 110* 112* 115* 115*   CO2 mmol/L 26.0 26.0 23.0 29.0 31.0*   BUN mg/dL 19 17 26* 41* 48*   CREATININE mg/dL 0.29* 0.28* 0.31* 0.52* 0.62   CALCIUM mg/dL 9.2 8.9 9.0 9.8 10.5   BILIRUBIN mg/dL  --   --  1.0 1.1 0.7   ALK PHOS U/L  --   --  54 58 64   ALT (SGPT) U/L  --   --  14 19 23   AST (SGOT) U/L  --   --  15 15 17   GLUCOSE mg/dL 137* 131* 125* 101* 159*       Culture Data:   Blood Culture   Date Value Ref Range Status   11/25/2023 No growth at 24 hours  Preliminary   11/25/2023 Staphylococcus, coagulase negative (C)  Final       Radiology Data:   Imaging Results (Last 24 Hours)       ** No results found for the last 24 hours. **            I have reviewed the patient's current medications.     Assessment/Plan   Assessment  Active Hospital Problems    Diagnosis     **Sepsis     Functional quadriplegia     Tracheostomy present     PEG tube malfunction     Severe malnutrition     Acute respiratory failure with hypoxia     Sepsis secondary to UTI     MVP (mitral valve prolapse)     Myofascial pain syndrome        Treatment Plan  Urine culture results as follows:    Change Abxs to IV Invanz  1/4 bottle on blood cultures positive for coagulase-negative staph-likely contaminant; continue to monitor off of Vancomycin  Labs reviewed; stable  TFs for nutrition with free water replacement  Mojica changed 11/27  Urology recommendations reviewed and appreciate Dr. Bailon assistance -patient will need follow-up at tertiary care facility for ongoing urology management with interventional radiology capability regarding her left percutaneous nephrostomy tube.  Monitor output from nephrostomy  Lovenox PPx  Bowel regimen  Palliative Care following  Discussed with MARIO.  Will  work towards discharge tomorrow to [facility] to complete 7 days total of Invanz via PICC    Medical Decision Making  Number and Complexity of problems: Moderate complexity; patient presented with sepsis likely secondary to urinary tract infection, previous history of ESBL infection, dehydration and intravascular volume depletion with evidence of hypernatremia, in addition to hypotension requiring vasopressors with Levophed for blood pressure support.  Multiple chronic comorbidities including bedbound state, reported history of anoxic brain injury (details unknown), chronic tracheostomy and PEG tube placement; also with chronic left nephrostomy tube      Conditions and Status        Condition is improving.     [facility] Data  External documents reviewed: none  Cardiac tracing (EKG, telemetry) interpretation: no new EKGs  Radiology interpretation: no new radiology studies  Labs reviewed: as above  Any tests that were considered but not ordered: None at this time     Decision rules/scores evaluated (example [id number], Wells, etc): None at this time     Discussed with: social work, Marie.  No family at bedside     Care Planning  Shared decision making: discussed with SW regarding dispo plan.  No family at bedside this morning  Code status and discussions: DO NOT RESUSCITATE and DO NOT INTUBATE    Disposition  Social Determinants of Health that impact treatment or disposition: Patient is from [facility] facility  I expect the patient to be discharged to nursing home facility tomorrow    Electronically signed by Arsalan Delarosa MD, 11/30/23, 11:51 CST.

## 2023-12-01 ENCOUNTER — TELEPHONE (OUTPATIENT)
Age: 48
End: 2023-12-01
Payer: MEDICARE

## 2023-12-01 ENCOUNTER — APPOINTMENT (OUTPATIENT)
Dept: GENERAL RADIOLOGY | Facility: HOSPITAL | Age: 48
DRG: 698 | End: 2023-12-01
Payer: MEDICARE

## 2023-12-01 LAB
BACTERIA SPEC AEROBE CULT: ABNORMAL
GLUCOSE BLDC GLUCOMTR-MCNC: 108 MG/DL (ref 70–130)
GLUCOSE BLDC GLUCOMTR-MCNC: 119 MG/DL (ref 70–130)
GLUCOSE BLDC GLUCOMTR-MCNC: 119 MG/DL (ref 70–130)
GLUCOSE BLDC GLUCOMTR-MCNC: 86 MG/DL (ref 70–130)
HOLD SPECIMEN: NORMAL

## 2023-12-01 PROCEDURE — 25010000002 ENOXAPARIN PER 10 MG: Performed by: INTERNAL MEDICINE

## 2023-12-01 PROCEDURE — 99222 1ST HOSP IP/OBS MODERATE 55: CPT | Performed by: INTERNAL MEDICINE

## 2023-12-01 PROCEDURE — 25510000001 IOPAMIDOL 61 % SOLUTION: Performed by: INTERNAL MEDICINE

## 2023-12-01 PROCEDURE — BT121ZZ FLUOROSCOPY OF LEFT KIDNEY USING LOW OSMOLAR CONTRAST: ICD-10-PCS | Performed by: RADIOLOGY

## 2023-12-01 PROCEDURE — 25010000002 MEROPENEM PER 100 MG: Performed by: INTERNAL MEDICINE

## 2023-12-01 PROCEDURE — 94799 UNLISTED PULMONARY SVC/PX: CPT

## 2023-12-01 PROCEDURE — 82948 REAGENT STRIP/BLOOD GLUCOSE: CPT

## 2023-12-01 PROCEDURE — 25810000003 LACTATED RINGERS PER 1000 ML: Performed by: INTERNAL MEDICINE

## 2023-12-01 RX ADMIN — IOPAMIDOL 10 ML: 612 INJECTION, SOLUTION INTRAVENOUS at 14:45

## 2023-12-01 RX ADMIN — Medication 1 TABLET: at 08:29

## 2023-12-01 RX ADMIN — ENOXAPARIN SODIUM 40 MG: 100 INJECTION SUBCUTANEOUS at 15:27

## 2023-12-01 RX ADMIN — BACLOFEN 20 MG: 10 TABLET ORAL at 00:03

## 2023-12-01 RX ADMIN — LANSOPRAZOLE 15 MG: 15 TABLET, ORALLY DISINTEGRATING ORAL at 06:09

## 2023-12-01 RX ADMIN — HYDROXYZINE HYDROCHLORIDE 25 MG: 25 TABLET ORAL at 17:29

## 2023-12-01 RX ADMIN — HYDROXYZINE HYDROCHLORIDE 25 MG: 25 TABLET ORAL at 11:27

## 2023-12-01 RX ADMIN — POLYETHYLENE GLYCOL 3350 17 G: 17 POWDER, FOR SOLUTION ORAL at 08:29

## 2023-12-01 RX ADMIN — Medication 10 ML: at 20:30

## 2023-12-01 RX ADMIN — HYDROXYZINE HYDROCHLORIDE 25 MG: 25 TABLET ORAL at 06:09

## 2023-12-01 RX ADMIN — BACLOFEN 20 MG: 10 TABLET ORAL at 06:09

## 2023-12-01 RX ADMIN — Medication 20 MG: at 00:14

## 2023-12-01 RX ADMIN — BACLOFEN 20 MG: 10 TABLET ORAL at 11:28

## 2023-12-01 RX ADMIN — Medication 20 MG: at 17:29

## 2023-12-01 RX ADMIN — Medication 10 ML: at 08:30

## 2023-12-01 RX ADMIN — MEROPENEM 1000 MG: 1 INJECTION, POWDER, FOR SOLUTION INTRAVENOUS at 08:52

## 2023-12-01 RX ADMIN — HYDROCODONE BITARTRATE AND ACETAMINOPHEN 1 TABLET: 5; 325 TABLET ORAL at 00:03

## 2023-12-01 RX ADMIN — ROSUVASTATIN CALCIUM 5 MG: 5 TABLET, FILM COATED ORAL at 08:29

## 2023-12-01 RX ADMIN — Medication 10 ML: at 20:29

## 2023-12-01 RX ADMIN — HYDROXYZINE HYDROCHLORIDE 25 MG: 25 TABLET ORAL at 00:03

## 2023-12-01 RX ADMIN — BACLOFEN 20 MG: 10 TABLET ORAL at 23:44

## 2023-12-01 RX ADMIN — BISACODYL 10 MG: 10 SUPPOSITORY RECTAL at 08:29

## 2023-12-01 RX ADMIN — Medication 20 MG: at 06:09

## 2023-12-01 RX ADMIN — MEROPENEM 1000 MG: 1 INJECTION, POWDER, FOR SOLUTION INTRAVENOUS at 21:46

## 2023-12-01 RX ADMIN — CHLORHEXIDINE GLUCONATE 15 ML: 1.2 SOLUTION ORAL at 08:29

## 2023-12-01 RX ADMIN — BACLOFEN 20 MG: 10 TABLET ORAL at 17:29

## 2023-12-01 RX ADMIN — SODIUM CHLORIDE, POTASSIUM CHLORIDE, SODIUM LACTATE AND CALCIUM CHLORIDE 50 ML/HR: 600; 310; 30; 20 INJECTION, SOLUTION INTRAVENOUS at 02:38

## 2023-12-01 RX ADMIN — Medication 20 MG: at 11:29

## 2023-12-01 RX ADMIN — Medication 20 MG: at 23:44

## 2023-12-01 RX ADMIN — MEROPENEM 1000 MG: 1 INJECTION, POWDER, FOR SOLUTION INTRAVENOUS at 15:27

## 2023-12-01 RX ADMIN — HYDROXYZINE HYDROCHLORIDE 25 MG: 25 TABLET ORAL at 23:44

## 2023-12-01 NOTE — PROGRESS NOTES
[facility] Medicine Services  INPATIENT PROGRESS NOTE    Patient Name: Namita Zabala  Date of Admission: 11/25/2023  Today's Date: 12/01/23  Length of Stay: 6  Primary Care Physician: David Lara MD    Subjective   Chief Complaint: follow-up sepsis  HPI   Patient unable to provide any additional history.  No acute events from overnight.  No family at bedside.  Tolerating tube feeds.  Nursing reports no significant output from left nephrostomy.    Review of Systems   All pertinent negatives and positives are as above. All other systems have been reviewed and are negative unless otherwise stated.     Objective    Temp:  [98.2 °F (36.8 °C)-98.8 °F (37.1 °C)] 98.4 °F (36.9 °C)  Heart Rate:  [] 97  Resp:  [16-18] 16  BP: (114-127)/(64-77) 125/77  Physical Exam  Vitals reviewed.   Constitutional:       Appearance: She is not toxic-appearing.   HENT:      Head: Normocephalic.      Mouth/Throat:      Mouth: Mucous membranes are moist.   Eyes:      Pupils: Pupils are equal, round, and reactive to light.   Neck:      Comments: Trach and trach collar in place  Cardiovascular:      Rate and Rhythm: Normal rate.   Pulmonary:      Effort: Pulmonary effort is normal. No respiratory distress.   Genitourinary:     Comments: Mojica and left nephrostomy tube (no output)  Musculoskeletal:      Comments: Significant contractures notes; left thigh PICC in place   Skin:     General: Skin is warm.   Neurological:      Mental Status: She is alert. Mental status is at baseline.      Motor: Weakness present.      Comments: Patient unable to provide any additional history         Results Review:  I have reviewed the labs, radiology results, and diagnostic studies.    Laboratory Data:   Results from last 7 days   Lab Units 11/30/23  0920 11/27/23  0953 11/26/23  0333   WBC 10*3/mm3 7.52 7.72 12.79*   HEMOGLOBIN g/dL 10.3* 10.4* 12.0   HEMATOCRIT % 32.1* 32.4* 39.7   PLATELETS 10*3/mm3  183 178 265        Results from last 7 days   Lab Units 11/30/23  0920 11/28/23  1027 11/27/23  0748 11/26/23  0333 11/25/23  1800   SODIUM mmol/L 139 143 146* 152* 157*   POTASSIUM mmol/L 4.4 4.3 3.9 4.1 4.0   CHLORIDE mmol/L 105 110* 112* 115* 115*   CO2 mmol/L 26.0 26.0 23.0 29.0 31.0*   BUN mg/dL 19 17 26* 41* 48*   CREATININE mg/dL 0.29* 0.28* 0.31* 0.52* 0.62   CALCIUM mg/dL 9.2 8.9 9.0 9.8 10.5   BILIRUBIN mg/dL  --   --  1.0 1.1 0.7   ALK PHOS U/L  --   --  54 58 64   ALT (SGPT) U/L  --   --  14 19 23   AST (SGOT) U/L  --   --  15 15 17   GLUCOSE mg/dL 137* 131* 125* 101* 159*       Culture Data:   Blood Culture   Date Value Ref Range Status   11/25/2023 No growth at 24 hours  Preliminary   11/25/2023 Staphylococcus, coagulase negative (C)  Final       Radiology Data:   Imaging Results (Last 24 Hours)       ** No results found for the last 24 hours. **            I have reviewed the patient's current medications.     Assessment/Plan   Assessment  Active Hospital Problems    Diagnosis     **Sepsis     Functional quadriplegia     Tracheostomy present     PEG tube malfunction     Severe malnutrition     Acute respiratory failure with hypoxia     Sepsis secondary to UTI     MVP (mitral valve prolapse)     Myofascial pain syndrome        Treatment Plan  Urine culture has been updated AGAIN and results as follows:      Given presence of Pseudomonas will transition Ertapenem to Meropenem  ID consultation  1/4 bottle on blood cultures positive for coagulase-negative staph-likely contaminant; continue to monitor off of Vancomycin  Labs reviewed; stable  TFs for nutrition with free water replacement  Mojica changed 11/27  Urology recommendations reviewed and appreciate Dr. Bailon assistance -patient will need follow-up at tertiary care facility for ongoing urology management with interventional radiology capability regarding her left percutaneous nephrostomy tube.  Given that she continues to lack of output from the  left nephrostomy, I will order a left nephrostogram today.   Lovenox PPx  Bowel regimen  Palliative Care following  Discussed with SW.  Will hold on discharge today given need for nephrostogram and antibiotic adjustment with urine culture now also revealing Pseudomonas.      Medical Decision Making  Number and Complexity of problems: Moderate complexity; patient presented with sepsis likely secondary to urinary tract infection, previous history of ESBL infection, dehydration and intravascular volume depletion with evidence of hypernatremia, in addition to hypotension requiring vasopressors with Levophed for blood pressure support.  Multiple chronic comorbidities including bedbound state, reported history of anoxic brain injury (details unknown), chronic tracheostomy and PEG tube placement; also with chronic left nephrostomy tube      Conditions and Status        Condition is unchanged     MDM Data  External documents reviewed: none  Cardiac tracing (EKG, telemetry) interpretation: no new EKGs  Radiology interpretation: no new radiology studies  Labs reviewed: as above  Any tests that were considered but not ordered: None at this time     Decision rules/scores evaluated (example [id number], Wells, etc): None at this time     Discussed with: bedside nurse, Angie     Care Planning  Shared decision making: discussed with SW regarding dispo plan.  No family at bedside this morning  Code status and discussions: DO NOT RESUSCITATE and DO NOT INTUBATE    Disposition  Social Determinants of Health that impact treatment or disposition: Patient is from [facility] home facility  I expected the patient to be discharged to nursing home facility today but will hold given these new findings.    Electronically signed by Arsalan Delarosa MD, 12/01/23, 11:43 CST.

## 2023-12-01 NOTE — CONSULTS
Rachael Foy MD  Physician  Infectious Disease     Progress Notes     Signed     Date of Service: 12/01/23 1123  Creation Time: 12/01/23 1123     Signed       Expand All Collapse All    INFECTIOUS DISEASES CONSULT NOTE     Patient:  Namita Zabala 48 y.o. female  ROOM # 371/1  YOB: 1975  MRN: [id number]  CSN:    [id number]  Admit date: 11/25/2023           Admitting Physician: Arsalan Delarosa MD  Primary Care Physician: David Lara MD  REFERRING PROVIDER: Kristi Lenz MD     Consults     REASON FOR CONSULTATION :    ESBL E. Coli, Pseudomonas, and Proteus UTI            HISTORY OF PRESENT ILLNESS: The patient is a 48-year-old who was brought by EMS to the emergency department November 25 with history of fever at the nursing home.     Patient is a functional quadriplegic secondary to anoxic brain injury per chart review.  She is unable to give any independent history.     The patient has Mojica catheter, nephrostomy tube which is not due to be changed out until January 11 and feeding tube.  Patient had urine culture positive for ESBL E. coli and Proteus mirabilis.  Antibiotics were changed to IV ertapenem.  Today blood culture is also reporting Pseudomonas.  Antibiotics been changed to meropenem.     Angie, RN at bedside.  She notes no erythema or concerns regarding skin around nephrostomy tube or back or buttock.  Patient has just been cleaned up after a small bowel movement.     Patient was seen in consultation by Dr. Bailon.  At that time nephrostomy tube appeared to be  functioning based on his exam and imaging.  He had requested that a Mojica catheter be changed out and a new 1 placed followed by repeat culture.  I do not see where repeat culture was obtained.     Medical History        Past Medical History:   Diagnosis Date    Acute kidney failure, unspecified      Angina at rest      Anoxic brain damage, not elsewhere classified      Chronic pulmonary embolism       Chronic respiratory failure, unspecified whether with hypoxia or hypercapnia      Dependence on respirator (ventilator) status      Gastrointestinal hemorrhage, unspecified      Hypercalcemia      Iron deficiency anemia      Metabolic encephalopathy      Migraine       Sees Pain Management for injections.     MVP (mitral valve prolapse)      Other dysphagia      Other pulmonary embolism with acute cor pulmonale      Renal disorder      Ruptured bladder      Syncope      Urinary tract infection, site not specified           Surgical History         Past Surgical History:   Procedure Laterality Date    ABDOMINAL SURGERY        BREAST BIOPSY Right 2011     benign    GTUBE INSERTION        INSERTION HEMODIALYSIS CATHETER Left 09/16/2021     Procedure: HEMODIALYSIS CATHETER INSERTION;  Surgeon: Anders Kaur MD;  Location: [place] [id number];  Service: Vascular;  Laterality: Left;    TONSILLECTOMY        TRACHEOSTOMY                   Family History   Problem Relation Age of Onset    Colon cancer Maternal Aunt 52    Fibromyalgia Mother      Heart disease Mother      Hypertension Mother      Hodgkin's lymphoma Paternal Grandmother      Ovarian cancer Neg Hx      Breast cancer Neg Hx        Social History   Social History           Socioeconomic History    Marital status:    Tobacco Use    Smoking status: Never    Smokeless tobacco: Never   Vaping Use    Vaping Use: Never used   Substance and Sexual Activity    Alcohol use: No    Drug use: No            Current Scheduled Medications:   baclofen, 20 mg, Per G Tube, Q6H  bisacodyl, 10 mg, Rectal, Daily  chlorhexidine, 15 mL, Mouth/Throat, BID  enoxaparin, 40 mg, Subcutaneous, Q24H  hydrOXYzine, 25 mg, Per G Tube, Q6H  lansoprazole, 15 mg, Per G Tube, Q AM  meropenem, 1,000 mg, Intravenous, Q8H  multivitamin with minerals, 1 tablet, Oral, Daily  polyethylene glycol, 17 g, Oral, Daily  rosuvastatin, 5 mg, Per G Tube, Daily  simethicone, 20 mg, Per G Tube,  "Q6H  sodium chloride, 10 mL, Intravenous, Q12H  sodium chloride, 10 mL, Intravenous, Q12H  sodium chloride, 10 mL, Intravenous, Q12H                    Current PRN Medications:    acetaminophen    bisacodyl    HYDROcodone-acetaminophen    ipratropium-albuterol    ondansetron    ondansetron    sodium chloride    sodium chloride    sodium chloride    zinc oxide        lactated ringers, 50 mL/hr, Last Rate: 50 mL/hr (23 0238)                       Allergies   Allergen Reactions    Coconut Unknown - High Severity    Levaquin [Levofloxacin] Other (See Comments)       unknown    Nuts Unknown - High Severity    Penicillins      [place] Other (See Comments)       Causes migraines per pt reports               Vital Signs:  /64 (BP Location: Right leg, Patient Position: Lying)   Pulse 85   Temp 98.3 °F (36.8 °C) (Axillary)   Resp 16   Ht 152.4 cm (60\")   Wt 67.7 kg (149 lb 3.2 oz)   LMP  (LMP Unknown)   SpO2 97%   BMI 29.14 kg/m²   Temp (24hrs), Av.4 °F (36.9 °C), Min:98.2 °F (36.8 °C), Max:98.8 °F (37.1 °C)        Physical Exam  General: Patient is a 48-year-old contracted female lying in bed appearing comfortable.  Initially sleeping.  HEENT: Eyes open to voice.  Neck: Trach in place with trach collar in place.  Abdomen: Feeding tube in place no surrounding erythema or drainage  Back: Unable to turn patient to evaluate but nephrostomy in place on left.  Neuro: Patient unable to move upper or lower extremities.  Nonverbal.        Line/IV site: Femoral PICC line     Results Review:    I reviewed the patient's new clinical results.     Lab Results:     CBC:          Lab Results   Lab 23  1800 23  0333 23  0953 23  0920   WBC 10.63 12.79* 7.72 7.52   HEMOGLOBIN 12.9 12.0 10.4* 10.3*   HEMATOCRIT 41.8 39.7 32.4* 32.1*   PLATELETS 255 265 178 183         AutoDiff:         Lab Results   Lab 23  1800 23  0333 23  0953   NEUTROPHIL % 80.1* 71.0 70.8   LYMPHOCYTE % " "12.5* 17.3* 13.5*   MONOCYTES % 5.3 7.0 5.7   EOSINOPHIL % 1.2 3.6 9.1*   BASOPHIL % 0.7 0.7 0.5   NEUTROS ABS 8.52* 9.08* 5.47   LYMPHS ABS 1.33 2.21 1.04   MONOS ABS 0.56 0.90 0.44   EOS ABS 0.13 0.46* 0.70*   BASOS ABS 0.07 0.09 0.04         Manual Diff:          Lab Results   Lab 11/25/23  1800 11/26/23  0333 11/27/23  0953   NEUTROS ABS 8.52* 9.08* 5.47         CMP:           Lab Results   Lab 11/25/23  1800 11/26/23  0333 11/27/23  0748 11/28/23  1027 11/30/23  0920   SODIUM 157* 152* 146* 143 139   POTASSIUM 4.0 4.1 3.9 4.3 4.4   CHLORIDE 115* 115* 112* 110* 105   CO2 31.0* 29.0 23.0 26.0 26.0   BUN 48* 41* 26* 17 19   CREATININE 0.62 0.52* 0.31* 0.28* 0.29*   CALCIUM 10.5 9.8 9.0 8.9 9.2   BILIRUBIN 0.7 1.1 1.0  --   --    ALK PHOS 64 58 54  --   --    ALT (SGPT) 23 19 14  --   --    AST (SGOT) 17 15 15  --   --    GLUCOSE 159* 101* 125* 131* 137*               Labeled as collected from \"indwelling urethral catheter\"     1819 11/25/2023 1853 Urinalysis, Microscopic Only - Indwelling Urethral Catheter [[id number]]   (Abnormal)   Urine from Indwelling Urethral Catheter    Final result Component Value Units   RBC, UA Too Numerous to Count Abnormal  /HPF   WBC, UA Too Numerous to Count Abnormal  /HPF   Bacteria, UA 4+ Abnormal  /HPF   Squamous Epithelial Cells, UA None Seen /HPF   Renal Epithelial Cells, UA 0-2 /HPF   Yeast, UA Small/1+ Budding Yeast /HPF   Hyaline Casts, UA None Seen /LPF   Calcium Oxalate Crystals, UA Small/1+ /HPF   Mucus, UA Small/1+ Abnormal  /HPF   WBC Clumps, UA Moderate/2+ /HPF   Methodology Manual Light Microscopy                                     Procedure Component Value Units Date/Time      Miscellaneous Micro Request - Specimen Not Sent, Specimen Not Sent [[id number]] Resulted: 12/01/23 1103     Specimen: Specimen Not Sent Updated: 12/01/23 1103       Extra Tube PER RENATO PEREA PHARMD     Narrative:       PER RENATO PEREA PHARMD  SEE URINE CULTURE # [id number]     Urine Culture - Urine, " Indwelling Urethral Catheter [[id number]]  (Abnormal)  (Susceptibility) Collected: 11/25/23 1819     Specimen: Urine from Indwelling Urethral Catheter Updated: 12/01/23 1101       Urine Culture >100,000 CFU/mL Escherichia coli ESBL       Comment:    Consider infectious disease consult.  Susceptibility results may not correlate to clinical outcomes.            50,000 CFU/mL Proteus mirabilis         50,000 CFU/mL Pseudomonas aeruginosa     Narrative:       Colonization of the urinary tract without infection is common. Treatment is discouraged unless the patient is symptomatic, pregnant, or undergoing an invasive urologic procedure.  Recent outcomes data supports the use of pip/tazo in the treatment of susceptible ESBL infections for uncomplicated UTI. Consider use of pip/tazo as a carbapenem-sparing regimen in applicable patients.     Susceptibility                   Escherichia coli ESBL         SANDEEP         Amikacin Susceptible         Ertapenem Susceptible         Gentamicin Resistant         Levofloxacin Resistant         Meropenem Susceptible         Nitrofurantoin Susceptible         Piperacillin + Tazobactam Susceptible         Tobramycin Resistant         Trimethoprim + Sulfamethoxazole Resistant                                Susceptibility                   Proteus mirabilis         SANDEEP         Ampicillin Susceptible         Ampicillin + Sulbactam Susceptible         Cefazolin Susceptible         Cefepime Susceptible         Ceftazidime Susceptible         Ceftriaxone Susceptible         Gentamicin Susceptible         Levofloxacin Resistant         Meropenem Susceptible (C)  [1]          Nitrofurantoin Resistant         Piperacillin + Tazobactam Susceptible         Trimethoprim + Sulfamethoxazole Susceptible                            [1]  Appended report. These results have been appended to a previously final verified report.                   Susceptibility                   Pseudomonas aeruginosa          SANDEEP         Cefepime Susceptible         Ceftazidime Susceptible         Ciprofloxacin Intermediate         Gentamicin Susceptible         Levofloxacin Intermediate         Meropenem Susceptible (C)  [1]          Piperacillin + Tazobactam Susceptible                            [1]  Appended report. These results have been appended to a previously final verified report.                        POC Glucose Once [[id number]]  (Normal) Collected: 12/01/23 0624     Specimen: Blood Updated: 12/01/23 0635       Glucose 119 mg/dL         Comment: : [id number] Shruthi KimbroMeter ID: [id number]        POC Glucose Once [[id number]]  (Normal) Collected: 12/01/23 0024     Specimen: Blood Updated: 12/01/23 0035       Glucose 86 mg/dL         Comment: : [id number] Shruthi KimbroMeter ID: [id number]        Blood Culture - Blood, Hand, Right [[id number]]  (Normal) Collected: 11/25/23 1850     Specimen: Blood from Hand, Right Updated: 11/30/23 1930       Blood Culture No growth at 5 days     POC Glucose Once [[id number]]  (Normal) Collected: 11/30/23 1157     Specimen: Blood Updated: 11/30/23 1208       Glucose 117 mg/dL         Comment: : [id number] Kevin BoyderMeter ID: [id number]        Basic Metabolic Panel [[id number]]  (Abnormal) Collected: 11/30/23 0920     Specimen: Blood Updated: 11/30/23 1019       Glucose 137 mg/dL         BUN 19 mg/dL         Creatinine 0.29 mg/dL         Sodium 139 mmol/L         Potassium 4.4 mmol/L         Chloride 105 mmol/L         CO2 26.0 mmol/L         Calcium 9.2 mg/dL         BUN/Creatinine Ratio 65.5       Anion Gap 8.0 mmol/L         eGFR 133.8 mL/min/1.73       Narrative:       GFR Normal >60  Chronic Kidney Disease <60  Kidney Failure <15        CBC (No Diff) [[id number]]  (Abnormal) Collected: 11/30/23 0920     Specimen: Blood Updated: 11/30/23 0951       WBC 7.52 10*3/mm3         RBC 3.37 10*6/mm3         Hemoglobin 10.3 g/dL         Hematocrit 32.1 %         MCV 95.3 fL          MCH 30.6 pg         MCHC 32.1 g/dL         RDW 12.8 %         RDW-SD 44.1 fl         MPV 11.7 fL         Platelets 183 10*3/mm3       POC Glucose Once [[id number]]  (Abnormal) Collected: 11/30/23 0527     Specimen: Blood Updated: 11/30/23 0538       Glucose 143 mg/dL         Comment: : [id number] Potter AnsleyMeter ID: [id number]        POC Glucose Once [[id number]]  (Normal) Collected: 11/29/23 2131     Specimen: Blood Updated: 11/29/23 2141       Glucose 117 mg/dL         Comment: : [id number] Tariq AnsleyMeter ID: [id number]        POC Glucose Once [[id number]]  (Normal) Collected: 11/29/23 1627     Specimen: Blood Updated: 11/29/23 1639       Glucose 116 mg/dL         Comment: : [id number] Mick Chandler ID: [id number]        POC Glucose Once [[id number]]  (Normal) Collected: 11/29/23 1305     Specimen: Blood Updated: 11/29/23 1316       Glucose 114 mg/dL         Comment: : [id number] Neri ArandatikighMeter ID: [id number]        POC Glucose Once [[id number]]  (Normal) Collected: 11/29/23 0639     Specimen: Blood Updated: 11/29/23 0651       Glucose 121 mg/dL         Comment: : [id number] Potter AnsleyMeter ID: [id number]        POC Glucose Once [[id number]]  (Normal) Collected: 11/28/23 2353     Specimen: Blood Updated: 11/29/23 0004       Glucose 100 mg/dL         Comment: : [id number] Tariq AnsleyMeter ID: [id number]        POC Glucose Once [[id number]]  (Normal) Collected: 11/28/23 1321     Specimen: Blood Updated: 11/28/23 1332       Glucose 93 mg/dL         Comment: : [id number] Clif LynchherMeter ID: [id number]        Basic Metabolic Panel [[id number]]  (Abnormal) Collected: 11/28/23 1027     Specimen: Blood Updated: 11/28/23 1146       Glucose 131 mg/dL         BUN 17 mg/dL         Creatinine 0.28 mg/dL         Sodium 143 mmol/L         Potassium 4.3 mmol/L         Comment: Slight hemolysis detected by analyzer. Result may be falsely elevated.          Chloride 110 mmol/L          CO2 26.0 mmol/L         Calcium 8.9 mg/dL         BUN/Creatinine Ratio 60.7       Anion Gap 7.0 mmol/L         eGFR 134.9 mL/min/1.73       Narrative:       GFR Normal >60  Chronic Kidney Disease <60  Kidney Failure <15                   Estimated Creatinine Clearance: 208.2 mL/min (A) (by C-G formula based on SCr of 0.29 mg/dL (L)).     Culture Results:     Microbiology Results (last 10 days)         Procedure Component Value - Date/Time     Miscellaneous Micro Request - Specimen Not Sent, Specimen Not Sent [[id number]] Resulted: 12/01/23 1103     Lab Status: Final result Specimen: Specimen Not Sent Updated: 12/01/23 1103       Extra Tube PER GROSS B. PHARMD     Narrative:       PER GROSS B. PHARMD  SEE URINE CULTURE # [id number]     Blood Culture - Blood, Hand, Right [[id number]]  (Normal) Collected: 11/25/23 1850     Lab Status: Final result Specimen: Blood from Hand, Right Updated: 11/30/23 1930       Blood Culture No growth at 5 days     COVID PRE-OP / PRE-PROCEDURE SCREENING ORDER (NO ISOLATION) - Swab, Nasopharynx [[id number]]  (Normal) Collected: 11/25/23 1820     Lab Status: Final result Specimen: Swab from Nasopharynx Updated: 11/25/23 1912     Narrative:       The following orders were created for panel order COVID PRE-OP / PRE-PROCEDURE SCREENING ORDER (NO ISOLATION) - Swab, Nasopharynx.  Procedure                               Abnormality         Status                     ---------                               -----------         ------                     COVID-19, FLU A/B, RSV P...[[id number]]  Normal              Final result                  Please view results for these tests on the individual orders.     COVID-19, FLU A/B, RSV PCR 1 HR TAT - Swab, Nasopharynx [[id number]]  (Normal) Collected: 11/25/23 1820     Lab Status: Final result Specimen: Swab from Nasopharynx Updated: 11/25/23 1912       COVID19 Not Detected       Influenza A PCR Not Detected       Influenza B PCR Not Detected       RSV, PCR Not  Detected     Narrative:       Fact sheet for providers: https://www.fda.gov/media/642471/download    Fact sheet for patients: https://www.fda.gov/media/862529/download     Test performed by PCR.     Urine Culture - Urine, Indwelling Urethral Catheter [[id number]]  (Abnormal)  (Susceptibility) Collected: 11/25/23 1814     Lab Status: Edited Result - FINAL Specimen: Urine from Indwelling Urethral Catheter Updated: 12/01/23 1101       Urine Culture >100,000 CFU/mL Escherichia coli ESBL       Comment:    Consider infectious disease consult.  Susceptibility results may not correlate to clinical outcomes.            50,000 CFU/mL Proteus mirabilis         50,000 CFU/mL Pseudomonas aeruginosa     Narrative:       Colonization of the urinary tract without infection is common. Treatment is discouraged unless the patient is symptomatic, pregnant, or undergoing an invasive urologic procedure.  Recent outcomes data supports the use of pip/tazo in the treatment of susceptible ESBL infections for uncomplicated UTI. Consider use of pip/tazo as a carbapenem-sparing regimen in applicable patients.     Susceptibility                   Escherichia coli ESBL         SANDEEP         Amikacin Susceptible         Ertapenem Susceptible         Gentamicin Resistant         Levofloxacin Resistant         Meropenem Susceptible         Nitrofurantoin Susceptible         Piperacillin + Tazobactam Susceptible         Tobramycin Resistant         Trimethoprim + Sulfamethoxazole Resistant                                Susceptibility                   Proteus mirabilis         SANDEEP         Ampicillin Susceptible         Ampicillin + Sulbactam Susceptible         Cefazolin Susceptible         Cefepime Susceptible         Ceftazidime Susceptible         Ceftriaxone Susceptible         Gentamicin Susceptible         Levofloxacin Resistant         Meropenem Susceptible (C)  [1]          Nitrofurantoin Resistant         Piperacillin + Tazobactam Susceptible          Trimethoprim + Sulfamethoxazole Susceptible                            [1]  Appended report. These results have been appended to a previously final verified report.                   Susceptibility                   Pseudomonas aeruginosa         SANDEEP         Cefepime Susceptible         Ceftazidime Susceptible         Ciprofloxacin Intermediate         Gentamicin Susceptible         Levofloxacin Intermediate         Meropenem Susceptible (C)  [1]          Piperacillin + Tazobactam Susceptible                            [1]  Appended report. These results have been appended to a previously final verified report.                        Blood Culture - Blood, Hand, Right [[id number]]  (Abnormal) Collected: 11/25/23 1800     Lab Status: Final result Specimen: Blood from Hand, Right Updated: 11/27/23 0525       Blood Culture Staphylococcus, coagulase negative       Isolated from Aerobic Bottle       Gram Stain Aerobic Bottle Gram positive cocci in clusters     Narrative:       Probable contaminant requires clinical correlation, susceptibility not performed unless requested by physician.        Blood Culture ID, PCR - Blood, Hand, Right [[id number]]  (Abnormal) Collected: 11/25/23 1800     Lab Status: Final result Specimen: Blood from Hand, Right Updated: 11/26/23 1531       BCID, PCR Staph spp, not aureus or lugdunensis. Identification by BCID2 PCR.       BOTTLE TYPE Aerobic Bottle                   Radiology:   Imaging Results (Last 72 Hours)         ** No results found for the last 72 hours. **                   HOSPITAL PROBLEM LIST:       Sepsis    Acute respiratory failure with hypoxia    Sepsis secondary to UTI    MVP (mitral valve prolapse)    Myofascial pain syndrome    Severe malnutrition    Functional quadriplegia    Tracheostomy present    PEG tube malfunction        IMPRESSION:  Sepsis on admission attributed to urinary tract infection with temp over 101 and required norepinephrine drip and ICU stay  initially.  She has a left-sided nephrostomy tube and intra ureteral stent.  Chest x-ray did not reveal any pneumonia.  Patient has clinically improved and is hemodynamically stable, out of the unit and received cefepime initially for almost 5 days prior to changing to ertapenem.  Urinary tract infection in patient with left-sided nephrostomy tube, intra ureteral stent, bilateral nonobstructing nephrolithiasis.  CT revealed some urothelial thickening of the upper tracts bilaterally as well as renal pelvises and mild perinephric stranding-essentially unchanged from 10/8 CT  Urine cultures positive for 50,000 CFU per mL of Proteus mirabilis, greater than 100,000 CFU per mL ESBL E. coli and 50,000 CFU/mL Pseudomonas aeruginosa (previous culture October 8 positive for Proteus mirabilis and ESBL E. coli  Positive blood culture for coagulase-negative staph consistent with contaminant  Functional quadriplegia and patient with anoxic brain injury  Chronic respiratory failure in patient with trach        RECOMMENDATION:   Continue meropenem for now  Await outcome of nephrostomy tube evaluation-noted in Dr. Delarosa's note that there have been some decreased output in the tube needs to be evaluated today  Could consider finishing out course with pip-tazo given the fact that patient clinically improved while receiving cefepime, and prolonged course not planned.  Trach management/trach collar as per attending  Continue tube feeding support  Patient will need to make sure she has follow-up with urology for management of nephrostomy tube that has an intraureteral stent component     Reviewed Dr. Rob Delarosa's note     Rachael Foy MD  12/01/23  11:23 CST

## 2023-12-01 NOTE — PLAN OF CARE
Goal Outcome Evaluation:  Plan of Care Reviewed With: patient        Progress: no change          Pt nonverbal and contracted. IVF infusing through femoral PICC per order. Mojica cath in place to SDB. Continuous tube feed and Meds crushed through PEG tube. Nephrostomy tube with no output. O2@8L via trach collar. Accu check Q6H. Turn Q2H. Contact precautions maintained. Safety maintained. Possible discharge later today.

## 2023-12-01 NOTE — PROGRESS NOTES
INFECTIOUS DISEASES CONSULT NOTE    Patient:  Namita Zabala 48 y.o. female  ROOM # 371/1  YOB: 1975  MRN: [id number]  [facility]:  [id number]  Admit date: 11/25/2023   Admitting Physician: Arsalan Delarosa MD  Primary Care Physician: David Lara MD  REFERRING PROVIDER: Kristi Lenz MD    Consults    REASON FOR CONSULTATION :    ESBL E. Coli, Pseudomonas, and Proteus UTI         HISTORY OF PRESENT ILLNESS: The patient is a 48-year-old who was brought by EMS to the emergency department November 25 with history of fever at the nursing home.    Patient is a functional quadriplegic secondary to anoxic brain injury per chart review.  She is unable to give any independent history.    The patient has Mojica catheter, nephrostomy tube which is not due to be changed out until January 11 and feeding tube.  Patient had urine culture positive for ESBL E. coli and Proteus mirabilis.  Antibiotics were changed to IV ertapenem.  Today blood culture is also reporting Pseudomonas.  Antibiotics been changed to meropenem.    Angie, RN at bedside.  She notes no erythema or concerns regarding skin around nephrostomy tube or back or buttock.  Patient has just been cleaned up after a small bowel movement.    Patient was seen in consultation by Dr. Bailon.  At that time nephrostomy tube appeared to be  functioning based on his exam and imaging.  He had requested that a Mojica catheter be changed out and a new 1 placed followed by repeat culture.  I do not see where repeat culture was obtained.    Past Medical History:   Diagnosis Date    Acute kidney failure, unspecified     Angina at rest     Anoxic brain damage, not elsewhere classified     Chronic pulmonary embolism     Chronic respiratory failure, unspecified whether with hypoxia or hypercapnia     Dependence on respirator (ventilator) status     Gastrointestinal hemorrhage, unspecified     Hypercalcemia     Iron deficiency anemia     Metabolic  encephalopathy     Migraine     Sees Pain Management for injections.     MVP (mitral valve prolapse)     Other dysphagia     Other pulmonary embolism with acute cor pulmonale     Renal disorder     Ruptured bladder     Syncope     Urinary tract infection, site not specified      Past Surgical History:   Procedure Laterality Date    ABDOMINAL SURGERY      BREAST BIOPSY Right 2011    benign    GTUBE INSERTION      INSERTION HEMODIALYSIS CATHETER Left 09/16/2021    Procedure: HEMODIALYSIS CATHETER INSERTION;  Surgeon: Anders Kaur MD;  Location: [place] [address];  Service: Vascular;  Laterality: Left;    TONSILLECTOMY      TRACHEOSTOMY       Family History   Problem Relation Age of Onset    Colon cancer Maternal Aunt 52    Fibromyalgia Mother     Heart disease Mother     Hypertension Mother     Hodgkin's lymphoma Paternal Grandmother     Ovarian cancer Neg Hx     Breast cancer Neg Hx      Social History     Socioeconomic History    Marital status:    Tobacco Use    Smoking status: Never    Smokeless tobacco: Never   Vaping Use    Vaping Use: Never used   Substance and Sexual Activity    Alcohol use: No    Drug use: No       Current Scheduled Medications:   baclofen, 20 mg, Per G Tube, Q6H  bisacodyl, 10 mg, Rectal, Daily  chlorhexidine, 15 mL, Mouth/Throat, BID  enoxaparin, 40 mg, Subcutaneous, Q24H  hydrOXYzine, 25 mg, Per G Tube, Q6H  lansoprazole, 15 mg, Per G Tube, Q AM  meropenem, 1,000 mg, Intravenous, Q8H  multivitamin with minerals, 1 tablet, Oral, Daily  polyethylene glycol, 17 g, Oral, Daily  rosuvastatin, 5 mg, Per G Tube, Daily  simethicone, 20 mg, Per G Tube, Q6H  sodium chloride, 10 mL, Intravenous, Q12H  sodium chloride, 10 mL, Intravenous, Q12H  sodium chloride, 10 mL, Intravenous, Q12H              Current PRN Medications:    acetaminophen    bisacodyl    HYDROcodone-acetaminophen    ipratropium-albuterol    ondansetron    ondansetron    sodium chloride    sodium chloride    sodium  "chloride    zinc oxide      lactated ringers, 50 mL/hr, Last Rate: 50 mL/hr (23 0238)             Allergies   Allergen Reactions    Coconut Unknown - High Severity    Levaquin [Levofloxacin] Other (See Comments)     unknown    Nuts Unknown - High Severity    Penicillins     [place] Other (See Comments)     Causes migraines per pt reports           Vital Signs:  /64 (BP Location: Right leg, Patient Position: Lying)   Pulse 85   Temp 98.3 °F (36.8 °C) (Axillary)   Resp 16   Ht 152.4 cm (60\")   Wt 67.7 kg (149 lb 3.2 oz)   LMP  (LMP Unknown)   SpO2 97%   BMI 29.14 kg/m²   Temp (24hrs), Av.4 °F (36.9 °C), Min:98.2 °F (36.8 °C), Max:98.8 °F (37.1 °C)      Physical Exam  General: Patient is a 48-year-old contracted female lying in bed appearing comfortable.  Initially sleeping.  HEENT: Eyes open to voice.  Neck: Trach in place with trach collar in place.  Abdomen: Feeding tube in place no surrounding erythema or drainage  Back: Unable to turn patient to evaluate but nephrostomy in place on left.  Neuro: Patient unable to move upper or lower extremities.  Nonverbal.      Line/IV site: Femoral PICC line    Results Review:    I reviewed the patient's new clinical results.    Lab Results:    CBC:   Lab Results   Lab 23  1800 23  0333 23  0953 23  0920   WBC 10.63 12.79* 7.72 7.52   HEMOGLOBIN 12.9 12.0 10.4* 10.3*   HEMATOCRIT 41.8 39.7 32.4* 32.1*   PLATELETS 255 265 178 183        AutoDiff:   Lab Results   Lab 23  1800 23  0333 23  0953   NEUTROPHIL % 80.1* 71.0 70.8   LYMPHOCYTE % 12.5* 17.3* 13.5*   MONOCYTES % 5.3 7.0 5.7   EOSINOPHIL % 1.2 3.6 9.1*   BASOPHIL % 0.7 0.7 0.5   NEUTROS ABS 8.52* 9.08* 5.47   LYMPHS ABS 1.33 2.21 1.04   MONOS ABS 0.56 0.90 0.44   EOS ABS 0.13 0.46* 0.70*   BASOS ABS 0.07 0.09 0.04        Manual Diff:    Lab Results   Lab 23  1800 23  0333 23  0953   NEUTROS ABS 8.52* 9.08* 5.47        CMP:   Lab Results   Lab " "11/25/23  1800 11/26/23  0333 11/27/23  0748 11/28/23  1027 11/30/23  0920   SODIUM 157* 152* 146* 143 139   POTASSIUM 4.0 4.1 3.9 4.3 4.4   CHLORIDE 115* 115* 112* 110* 105   CO2 31.0* 29.0 23.0 26.0 26.0   BUN 48* 41* 26* 17 19   CREATININE 0.62 0.52* 0.31* 0.28* 0.29*   CALCIUM 10.5 9.8 9.0 8.9 9.2   BILIRUBIN 0.7 1.1 1.0  --   --    ALK PHOS 64 58 54  --   --    ALT (SGPT) 23 19 14  --   --    AST (SGOT) 17 15 15  --   --    GLUCOSE 159* 101* 125* 131* 137*           Labeled as collected from \"indwelling urethral catheter\"    1819 11/25/2023 1853 Urinalysis, Microscopic Only - Indwelling Urethral Catheter [[id number]]   (Abnormal)   Urine from Indwelling Urethral Catheter    Final result Component Value Units   RBC, UA Too Numerous to Count Abnormal  /HPF   WBC, UA Too Numerous to Count Abnormal  /HPF   Bacteria, UA 4+ Abnormal  /HPF   Squamous Epithelial Cells, UA None Seen /HPF   Renal Epithelial Cells, UA 0-2 /HPF   Yeast, UA Small/1+ Budding Yeast /HPF   Hyaline Casts, UA None Seen /LPF   Calcium Oxalate Crystals, UA Small/1+ /HPF   Mucus, UA Small/1+ Abnormal  /HPF   WBC Clumps, UA Moderate/2+ /HPF   Methodology Manual Light Microscopy                         Data is abnormal   Procedure Component Value Units Date/Time    Miscellaneous Micro Request - Specimen Not Sent, Specimen Not Sent [[id number]] Resulted: 12/01/23 1103    Specimen: Specimen Not Sent Updated: 12/01/23 1103     Extra Tube PER GROSS B. PHARMD    Narrative:      PER GROSS B. PHARMD  SEE URINE CULTURE # [id number]    Urine Culture - Urine, Indwelling Urethral Catheter [[id number]]  (Abnormal)  (Susceptibility) Collected: 11/25/23 1819    Specimen: Urine from Indwelling Urethral Catheter Updated: 12/01/23 1101     Urine Culture >100,000 CFU/mL Escherichia coli ESBL     Comment:   Consider infectious disease consult.  Susceptibility results may not correlate to clinical outcomes.         50,000 CFU/mL Proteus mirabilis      50,000 CFU/mL Pseudomonas " aeruginosa    Narrative:      Colonization of the urinary tract without infection is common. Treatment is discouraged unless the patient is symptomatic, pregnant, or undergoing an invasive urologic procedure.  Recent outcomes data supports the use of pip/tazo in the treatment of susceptible ESBL infections for uncomplicated UTI. Consider use of pip/tazo as a carbapenem-sparing regimen in applicable patients.    Susceptibility        Escherichia coli ESBL      SANDEEP      Amikacin Susceptible      Ertapenem Susceptible      Gentamicin Resistant      Levofloxacin Resistant      Meropenem Susceptible      Nitrofurantoin Susceptible      Piperacillin + Tazobactam Susceptible      Tobramycin Resistant      Trimethoprim + Sulfamethoxazole Resistant                       Susceptibility        Proteus mirabilis      SANDEEP      Ampicillin Susceptible      Ampicillin + Sulbactam Susceptible      Cefazolin Susceptible      Cefepime Susceptible      Ceftazidime Susceptible      Ceftriaxone Susceptible      Gentamicin Susceptible      Levofloxacin Resistant      Meropenem Susceptible (C)  [1]       Nitrofurantoin Resistant      Piperacillin + Tazobactam Susceptible      Trimethoprim + Sulfamethoxazole Susceptible                   [1]  Appended report. These results have been appended to a previously final verified report.               Susceptibility        Pseudomonas aeruginosa      SANDEEP      Cefepime Susceptible      Ceftazidime Susceptible      Ciprofloxacin Intermediate      Gentamicin Susceptible      Levofloxacin Intermediate      Meropenem Susceptible (C)  [1]       Piperacillin + Tazobactam Susceptible                   [1]  Appended report. These results have been appended to a previously final verified report.                   POC Glucose Once [[id number]]  (Normal) Collected: 12/01/23 0624    Specimen: Blood Updated: 12/01/23 0635     Glucose 119 mg/dL      Comment: : [id number] Shruthi Salas ID: [id number]        POC Glucose Once [[id number]]  (Normal) Collected: 12/01/23 0024    Specimen: Blood Updated: 12/01/23 0035     Glucose 86 mg/dL      Comment: : [id number] Shruthi TellezMeter ID: [id number]       Blood Culture - Blood, Hand, Right [[id number]]  (Normal) Collected: 11/25/23 1850    Specimen: Blood from Hand, Right Updated: 11/30/23 1930     Blood Culture No growth at 5 days    POC Glucose Once [[id number]]  (Normal) Collected: 11/30/23 1157    Specimen: Blood Updated: 11/30/23 1208     Glucose 117 mg/dL      Comment: : [id number] Kevin BoyderMeter ID: [id number]       Basic Metabolic Panel [[id number]]  (Abnormal) Collected: 11/30/23 0920    Specimen: Blood Updated: 11/30/23 1019     Glucose 137 mg/dL      BUN 19 mg/dL      Creatinine 0.29 mg/dL      Sodium 139 mmol/L      Potassium 4.4 mmol/L      Chloride 105 mmol/L      CO2 26.0 mmol/L      Calcium 9.2 mg/dL      BUN/Creatinine Ratio 65.5     Anion Gap 8.0 mmol/L      eGFR 133.8 mL/min/1.73     Narrative:      GFR Normal >60  Chronic Kidney Disease <60  Kidney Failure <15      CBC (No Diff) [[id number]]  (Abnormal) Collected: 11/30/23 0920    Specimen: Blood Updated: 11/30/23 0951     WBC 7.52 10*3/mm3      RBC 3.37 10*6/mm3      Hemoglobin 10.3 g/dL      Hematocrit 32.1 %      MCV 95.3 fL      MCH 30.6 pg      MCHC 32.1 g/dL      RDW 12.8 %      RDW-SD 44.1 fl      MPV 11.7 fL      Platelets 183 10*3/mm3     POC Glucose Once [[id number]]  (Abnormal) Collected: 11/30/23 0527    Specimen: Blood Updated: 11/30/23 0538     Glucose 143 mg/dL      Comment: : [id number] Tariq AnsleyMeter ID: [id number]       POC Glucose Once [[id number]]  (Normal) Collected: 11/29/23 2131    Specimen: Blood Updated: 11/29/23 2141     Glucose 117 mg/dL      Comment: : [id number] Potter AnsleyMeter ID: [id number]       POC Glucose Once [[id number]]  (Normal) Collected: 11/29/23 1627    Specimen: Blood Updated: 11/29/23 1639     Glucose 116 mg/dL      Comment: : [id number]  Mick Ramesh ID: [id number]       POC Glucose Once [[id number]]  (Normal) Collected: 11/29/23 1305    Specimen: Blood Updated: 11/29/23 1316     Glucose 114 mg/dL      Comment: : [id number] Neri CarltonMeter ID: [id number]       POC Glucose Once [[id number]]  (Normal) Collected: 11/29/23 0639    Specimen: Blood Updated: 11/29/23 0651     Glucose 121 mg/dL      Comment: : [id number] Tariq HortonleyMeter ID: [id number]       POC Glucose Once [[id number]]  (Normal) Collected: 11/28/23 2353    Specimen: Blood Updated: 11/29/23 0004     Glucose 100 mg/dL      Comment: : [id number] Tariq AnsleyMeter ID: [id number]       POC Glucose Once [[id number]]  (Normal) Collected: 11/28/23 1321    Specimen: Blood Updated: 11/28/23 1332     Glucose 93 mg/dL      Comment: : [id number] Clif NegronMeter ID: [id number]       Basic Metabolic Panel [[id number]]  (Abnormal) Collected: 11/28/23 1027    Specimen: Blood Updated: 11/28/23 1146     Glucose 131 mg/dL      BUN 17 mg/dL      Creatinine 0.28 mg/dL      Sodium 143 mmol/L      Potassium 4.3 mmol/L      Comment: Slight hemolysis detected by analyzer. Result may be falsely elevated.        Chloride 110 mmol/L      CO2 26.0 mmol/L      Calcium 8.9 mg/dL      BUN/Creatinine Ratio 60.7     Anion Gap 7.0 mmol/L      eGFR 134.9 mL/min/1.73     Narrative:      GFR Normal >60  Chronic Kidney Disease <60  Kidney Failure <15              Estimated Creatinine Clearance: 208.2 mL/min (A) (by C-G formula based on SCr of 0.29 mg/dL (L)).    Culture Results:    Microbiology Results (last 10 days)       Procedure Component Value - Date/Time    Miscellaneous Micro Request - Specimen Not Sent, Specimen Not Sent [[id number]] Resulted: 12/01/23 1103    Lab Status: Final result Specimen: Specimen Not Sent Updated: 12/01/23 1103     Extra Tube PER GROSS B. PHARMD    Narrative:      PER GROSS B. PHARMD  SEE URINE CULTURE # [id number]    Blood Culture - Blood, Hand, Right [[id number]]   (Normal) Collected: 11/25/23 1850    Lab Status: Final result Specimen: Blood from Hand, Right Updated: 11/30/23 1930     Blood Culture No growth at 5 days    COVID PRE-OP / PRE-PROCEDURE SCREENING ORDER (NO ISOLATION) - Swab, Nasopharynx [[id number]]  (Normal) Collected: 11/25/23 1820    Lab Status: Final result Specimen: Swab from Nasopharynx Updated: 11/25/23 1912    Narrative:      The following orders were created for panel order COVID PRE-OP / PRE-PROCEDURE SCREENING ORDER (NO ISOLATION) - Swab, Nasopharynx.  Procedure                               Abnormality         Status                     ---------                               -----------         ------                     COVID-19, FLU A/B, RSV P...[[id number]]  Normal              Final result                 Please view results for these tests on the individual orders.    COVID-19, FLU A/B, RSV PCR 1 HR TAT - Swab, Nasopharynx [[id number]]  (Normal) Collected: 11/25/23 1820    Lab Status: Final result Specimen: Swab from Nasopharynx Updated: 11/25/23 1912     COVID19 Not Detected     Influenza A PCR Not Detected     Influenza B PCR Not Detected     RSV, PCR Not Detected    Narrative:      Fact sheet for providers: https://www.fda.gov/media/283387/download    Fact sheet for patients: https://www.fda.gov/media/055117/download    Test performed by PCR.    Urine Culture - Urine, Indwelling Urethral Catheter [[id number]]  (Abnormal)  (Susceptibility) Collected: 11/25/23 1819    Lab Status: Edited Result - FINAL Specimen: Urine from Indwelling Urethral Catheter Updated: 12/01/23 1101     Urine Culture >100,000 CFU/mL Escherichia coli ESBL     Comment:   Consider infectious disease consult.  Susceptibility results may not correlate to clinical outcomes.         50,000 CFU/mL Proteus mirabilis      50,000 CFU/mL Pseudomonas aeruginosa    Narrative:      Colonization of the urinary tract without infection is common. Treatment is discouraged unless the patient is  symptomatic, pregnant, or undergoing an invasive urologic procedure.  Recent outcomes data supports the use of pip/tazo in the treatment of susceptible ESBL infections for uncomplicated UTI. Consider use of pip/tazo as a carbapenem-sparing regimen in applicable patients.    Susceptibility        Escherichia coli ESBL      SANDEEP      Amikacin Susceptible      Ertapenem Susceptible      Gentamicin Resistant      Levofloxacin Resistant      Meropenem Susceptible      Nitrofurantoin Susceptible      Piperacillin + Tazobactam Susceptible      Tobramycin Resistant      Trimethoprim + Sulfamethoxazole Resistant                       Susceptibility        Proteus mirabilis      SANDEEP      Ampicillin Susceptible      Ampicillin + Sulbactam Susceptible      Cefazolin Susceptible      Cefepime Susceptible      Ceftazidime Susceptible      Ceftriaxone Susceptible      Gentamicin Susceptible      Levofloxacin Resistant      Meropenem Susceptible (C)  [1]       Nitrofurantoin Resistant      Piperacillin + Tazobactam Susceptible      Trimethoprim + Sulfamethoxazole Susceptible                   [1]  Appended report. These results have been appended to a previously final verified report.               Susceptibility        Pseudomonas aeruginosa      SANDEEP      Cefepime Susceptible      Ceftazidime Susceptible      Ciprofloxacin Intermediate      Gentamicin Susceptible      Levofloxacin Intermediate      Meropenem Susceptible (C)  [1]       Piperacillin + Tazobactam Susceptible                   [1]  Appended report. These results have been appended to a previously final verified report.                   Blood Culture - Blood, Hand, Right [[id number]]  (Abnormal) Collected: 11/25/23 1800    Lab Status: Final result Specimen: Blood from Hand, Right Updated: 11/27/23 0547     Blood Culture Staphylococcus, coagulase negative     Isolated from Aerobic Bottle     Gram Stain Aerobic Bottle Gram positive cocci in clusters    Narrative:       Probable contaminant requires clinical correlation, susceptibility not performed unless requested by physician.      Blood Culture ID, PCR - Blood, Hand, Right [[id number]]  (Abnormal) Collected: 11/25/23 1800    Lab Status: Final result Specimen: Blood from Hand, Right Updated: 11/26/23 1531     BCID, PCR Staph spp, not aureus or lugdunensis. Identification by BCID2 PCR.     BOTTLE TYPE Aerobic Bottle               Radiology:   Imaging Results (Last 72 Hours)       ** No results found for the last 72 hours. **              HOSPITAL PROBLEM LIST:      Sepsis    Acute respiratory failure with hypoxia    Sepsis secondary to UTI    MVP (mitral valve prolapse)    Myofascial pain syndrome    Severe malnutrition    Functional quadriplegia    Tracheostomy present    PEG tube malfunction      IMPRESSION:  Sepsis on admission attributed to urinary tract infection with temp over 101 and required norepinephrine drip and ICU stay initially.  She has a left-sided nephrostomy tube and intra ureteral stent.  Chest x-ray did not reveal any pneumonia.  Patient has clinically improved and is hemodynamically stable, out of the unit and received cefepime initially for almost 5 days prior to changing to ertapenem.  Urinary tract infection in patient with left-sided nephrostomy tube, intra ureteral stent, bilateral nonobstructing nephrolithiasis.  CT revealed some urothelial thickening of the upper tracts bilaterally as well as renal pelvises and mild perinephric stranding-essentially unchanged from 10/8 CT  Urine cultures positive for 50,000 CFU per mL of Proteus mirabilis, greater than 100,000 CFU per mL ESBL E. coli and 50,000 CFU/mL Pseudomonas aeruginosa (previous culture October 8 positive for Proteus mirabilis and ESBL E. coli  Positive blood culture for coagulase-negative staph consistent with contaminant  Functional quadriplegia and patient with anoxic brain injury  Chronic respiratory failure in patient with  trach      RECOMMENDATION:   Continue meropenem for now  Await outcome of nephrostomy tube evaluation-noted in Dr. Delarosa's note that there have been some decreased output in the tube needs to be evaluated today  Could consider finishing out course with pip-tazo given the fact that patient clinically improved while receiving cefepime, and prolonged course not planned.  Trach management/trach collar as per attending  Continue tube feeding support  Patient will need to make sure she has follow-up with urology for management of nephrostomy tube that has an intraureteral stent component    Reviewed Dr. Rob Delarosa's note    Rachael Foy MD  12/01/23  11:23 CST

## 2023-12-01 NOTE — PROGRESS NOTES
Adult Nutrition  Assessment/PES    Patient Name:  Namita Zabala  YOB: 1975  MRN: [id number]  Admit Date:  11/25/2023    Assessment Date:  12/1/2023    NTN follow-up. Palliative care following. Visited pt at bedside. Pt asleep during visit. Pt currently receiving continuous feeds of isosource 1.5  @ 45 mL/hr with water flushes @ 40 mL/hr. No reports of TF intolerance. Gastric residuals 0. Continue feeds. Will continue to follow per protocol.     Reason for Assessment       Row Name 12/01/23 1235          Reason for Assessment    Reason For Assessment follow-up protocol;TF/PN                    Nutrition/Diet History       Row Name 12/01/23 1243          Nutrition/Diet History    Typical Intake (Food/Fluid/EN/PN) NTN follow-up. Palliative care following. Visited pt at bedside. Pt asleep during visit. Pt currently receiving continuous feeds of isosource 1.5 @ 45 mL/hr with water flushes @ 40 mL/hr. No reports of TF intolerance. Gastric residuals 0. Continue feeds. Will continue to follow per protocol.     Enteral Nutrition Regimen sosource 1.5 @ 45 mL/hr with water flushes @ 40 mL/hr     Functional Status nonambulatory     Factors Affecting Nutritional Intake respiratory difficulty/therapies;enteral/parenteral device(s)                    Labs/Tests/Procedures/Meds       Row Name 12/01/23 1235          Labs/Procedures/Meds    Lab Results Reviewed reviewed        Diagnostic Tests/Procedures    Diagnostic Test/Procedure Reviewed reviewed        Medications    Pertinent Medications Reviewed reviewed, pertinent     Pertinent Medications Comments miralax, MVI, prevacid, dulcolax                    Physical Findings       Row Name 12/01/23 1236          Physical Findings    Overall Physical Appearance rachel score 12, left nephrostomy,g-tube,trach collar with humidified O2, 11/29                      Nutrition Prescription Ordered       Row Name 12/01/23 1236          Nutrition Prescription PO     Current PO Diet NPO        Nutrition Prescription EN    Enteral Route PEG     Product Isosource 1.5 (Jevity 1.5)     TF Delivery Method Continuous     Continuous TF Goal Rate (mL/hr) 45 mL/hr     Continuous TF Current Rate (mL/hr) 45 mL/hr     Continuous TF Goal Volume (mL) 990 mL     Continuous TF Current Volume (mL) 990 mL  per orders     Water flush (mL)  40 mL     Water Flush Frequency Per hour                    Evaluation of Received Nutrient/Fluid Intake       Row Name 12/01/23 1237          Nutrient/Fluid Evaluation    Number of Days Evaluated 3 days     Additional Documentation Calories Evaluation (Group);Fluid Intake Evaluation (Group);Intake Assessment (Group);Protein Evaluation (Group)        Calories Evaluation    Total Calorie Target (kcal) 1455     Oral Calories (kcal) 0     Enteral Calories (kcal) 1713     Parenteral Calories (kcal) 0     Other Calories (kcal) 0     Total Calories (kcal) 1713     % of Kcal Needs 117.73        Protein Evaluation    Total Protein Target (g) 69.8     Oral Protein (g) 0     Enteral Protein (g) 77.66     Parenteral Protein (g) 0     Other Protein (g) 0     Total Protein (g) 77.66     % of Protein Needs 111.26        Intake Assessment    Energy/Calorie Requirement Assessment exceeds needs     Protein Requirement Assessment exceeds needs     Fluid Requirement Assessment exceeds needs     Average Calorie Intake (days) 3     Average Protein Intake (days) 3     Tolerance tolerating        Fluid Intake Evaluation    Total Fluid Target (mL) 1746     Oral Fluid (mL) 0     Enteral Fluid (mL) 1301.92     Parenteral Fluid (mL) 0     Other Fluid (mL) 1155.96     Total Fluid Intake (mL) 2457.88     % Total Fluid Intake 140.77        PO Evaluation    % PO Intake NPO        EN Evaluation    Number of Days EN Intake Evaluated 3 days     EN Average Volume Delivered (mL/day) 1142 mL/day     % Goal Volume  115 %     TF Residual 0     HOB Greater than or equal to 30 degress                        Problem/Interventions:   Problem 1       Row Name 12/01/23 1244          Nutrition Diagnoses Problem 1    Problem 1 Needs Alternate Route     Etiology (related to) Medical Diagnosis     Infectious Disease UTI;Sepsis     Pulmonary/Critical Care Other (comment)  respiratory insufficiency     Signs/Symptoms (evidenced by) NPO;Other (comment);Report/Observation;EN Intake Delivery     Percent (%) intake recorded --  npo     Percent (%) of EN goal 115 %     Oral Swallowing Difficulty     Other Comment has g-tube                          Intervention Goal       Row Name 12/01/23 1245          Intervention Goal    General Disease management/therapy;Meet nutritional needs for age/condition;Reduce/improve symptoms;Nutrition support treatment     TF/PN Maintain TF/PN;Tolerate TF at goal     Weight Maintain weight                    Nutrition Intervention       Row Name 12/01/23 1245          Nutrition Intervention    RD/Tech Action Care plan reviewd;Follow Tx progress                    Nutrition Prescription       Row Name 12/01/23 1245          Nutrition Prescription EN    Enteral Prescription Continue same protocol                    Education/Evaluation       Row Name 12/01/23 1245          Education    Education No discharge needs identified at this time        Monitor/Evaluation    Monitor Per protocol;TF delivery/tolerance;Pertinent labs                     Electronically signed by:  Cal Ferguson RD  12/01/23 12:45 CST

## 2023-12-01 NOTE — PLAN OF CARE
Goal Outcome Evaluation:      Continuing maintenance IV fluids and IV antibiotic- Merrem. Tube feeding- Jevity 1.5 darryl at 40 ml/hr. Nephrostogram completed this afternoon. No output in Nephrostomy tube. F/C to BSD. Trach collar- humidified oxygen 8 Liters. Infectious disease consulted today and was seen (ESBL E. Coli, Pseudomonas, and Proteus UTI). Pt to go to [facility] at discharge.

## 2023-12-01 NOTE — PLAN OF CARE
Goal Outcome Evaluation:           Progress: no change  Outcome Evaluation: Patient non-verbal, will blink eyes for questions asked, pain meds given prn. All lines/drains C/D/I. Tube feeds at goal, stratton, accuchecks q6, lovenox, VSS, Safety maintained.

## 2023-12-01 NOTE — CASE MANAGEMENT/SOCIAL WORK
Continued Stay Note   [place]     Patient Name: Namita Zabala  MRN: [id number]  Today's Date: 12/1/2023    Admit Date: 11/25/2023    Plan: [facility]   Discharge Plan       Row Name 12/01/23 1514       Plan    Plan [facility]    Patient/Family in Agreement with Plan yes    Plan Comments [facility] unable to take pt. Informed pt's spouse, Grant [phone]. Pt will return to [facility] at d/c.                   Discharge Codes    No documentation.                 Expected Discharge Date and Time       Expected Discharge Date Expected Discharge Time    Dec 1, 2023               ORAL Mcintyre

## 2023-12-01 NOTE — TELEPHONE ENCOUNTER
Message sent to Dr. Goff:  [10:16 AM] Catherine Vega ([facility])    New Consult  Kiesha with [facility]  [phone]  Namita Zabala  : 1975  [facility] room # 371  Consulting: Rob Delarosa MD  For: ESBL E. Coli, Pseudomonas, and Proteus UTI     YOU SAW HER AT [facility] 2022  Your last note is dated 22.  She was transferred to .      [10:34 AM] Rachael Foy ([facility])  Ok

## 2023-12-02 LAB
GLUCOSE BLDC GLUCOMTR-MCNC: 103 MG/DL (ref 70–130)
GLUCOSE BLDC GLUCOMTR-MCNC: 108 MG/DL (ref 70–130)
GLUCOSE BLDC GLUCOMTR-MCNC: 110 MG/DL (ref 70–130)
GLUCOSE BLDC GLUCOMTR-MCNC: 115 MG/DL (ref 70–130)

## 2023-12-02 PROCEDURE — 94799 UNLISTED PULMONARY SVC/PX: CPT

## 2023-12-02 PROCEDURE — 25810000003 LACTATED RINGERS PER 1000 ML: Performed by: INTERNAL MEDICINE

## 2023-12-02 PROCEDURE — 99232 SBSQ HOSP IP/OBS MODERATE 35: CPT | Performed by: INTERNAL MEDICINE

## 2023-12-02 PROCEDURE — 82948 REAGENT STRIP/BLOOD GLUCOSE: CPT

## 2023-12-02 PROCEDURE — 25010000002 MEROPENEM PER 100 MG: Performed by: INTERNAL MEDICINE

## 2023-12-02 PROCEDURE — 25010000002 ENOXAPARIN PER 10 MG: Performed by: INTERNAL MEDICINE

## 2023-12-02 PROCEDURE — 94761 N-INVAS EAR/PLS OXIMETRY MLT: CPT

## 2023-12-02 RX ADMIN — MEROPENEM 1000 MG: 1 INJECTION, POWDER, FOR SOLUTION INTRAVENOUS at 22:28

## 2023-12-02 RX ADMIN — BACLOFEN 20 MG: 10 TABLET ORAL at 11:25

## 2023-12-02 RX ADMIN — Medication 20 MG: at 05:34

## 2023-12-02 RX ADMIN — Medication 10 ML: at 20:43

## 2023-12-02 RX ADMIN — Medication 10 ML: at 09:21

## 2023-12-02 RX ADMIN — IPRATROPIUM BROMIDE AND ALBUTEROL SULFATE 3 ML: .5; 3 SOLUTION RESPIRATORY (INHALATION) at 22:54

## 2023-12-02 RX ADMIN — ENOXAPARIN SODIUM 40 MG: 100 INJECTION SUBCUTANEOUS at 15:40

## 2023-12-02 RX ADMIN — POLYETHYLENE GLYCOL 3350 17 G: 17 POWDER, FOR SOLUTION ORAL at 09:20

## 2023-12-02 RX ADMIN — MEROPENEM 1000 MG: 1 INJECTION, POWDER, FOR SOLUTION INTRAVENOUS at 15:40

## 2023-12-02 RX ADMIN — SODIUM CHLORIDE, POTASSIUM CHLORIDE, SODIUM LACTATE AND CALCIUM CHLORIDE 50 ML/HR: 600; 310; 30; 20 INJECTION, SOLUTION INTRAVENOUS at 01:36

## 2023-12-02 RX ADMIN — LANSOPRAZOLE 15 MG: 15 TABLET, ORALLY DISINTEGRATING ORAL at 05:32

## 2023-12-02 RX ADMIN — BACLOFEN 20 MG: 10 TABLET ORAL at 05:32

## 2023-12-02 RX ADMIN — BISACODYL 10 MG: 10 SUPPOSITORY RECTAL at 09:19

## 2023-12-02 RX ADMIN — BACLOFEN 20 MG: 10 TABLET ORAL at 17:18

## 2023-12-02 RX ADMIN — Medication 20 MG: at 17:18

## 2023-12-02 RX ADMIN — CHLORHEXIDINE GLUCONATE 15 ML: 1.2 SOLUTION ORAL at 09:22

## 2023-12-02 RX ADMIN — Medication 1 TABLET: at 09:20

## 2023-12-02 RX ADMIN — HYDROCODONE BITARTRATE AND ACETAMINOPHEN 1 TABLET: 5; 325 TABLET ORAL at 18:50

## 2023-12-02 RX ADMIN — ACETAMINOPHEN TAB 500 MG 500 MG: 500 TAB at 00:29

## 2023-12-02 RX ADMIN — ROSUVASTATIN CALCIUM 5 MG: 5 TABLET, FILM COATED ORAL at 09:20

## 2023-12-02 RX ADMIN — MEROPENEM 1000 MG: 1 INJECTION, POWDER, FOR SOLUTION INTRAVENOUS at 05:32

## 2023-12-02 RX ADMIN — Medication 20 MG: at 11:24

## 2023-12-02 RX ADMIN — HYDROXYZINE HYDROCHLORIDE 25 MG: 25 TABLET ORAL at 17:18

## 2023-12-02 RX ADMIN — HYDROXYZINE HYDROCHLORIDE 25 MG: 25 TABLET ORAL at 11:25

## 2023-12-02 RX ADMIN — HYDROXYZINE HYDROCHLORIDE 25 MG: 25 TABLET ORAL at 05:32

## 2023-12-02 NOTE — PLAN OF CARE
Goal Outcome Evaluation:  Plan of Care Reviewed With: patient        Progress: no change          Pt nonverbal and contracted. IVF and abx infusing through femoral PICC per order. Continuous tube feed and meds crushed through PEG tube. Accu check. Mojica cath in place to SBD. Neph tube with output. O2@8L via trach collar. Contact precautions maintained. Turn Q2H. Safety maintained.

## 2023-12-02 NOTE — CASE MANAGEMENT/SOCIAL WORK
Continued Stay Note   [place]     Patient Name: Namita Zabala  MRN: [id number]  Today's Date: 12/2/2023    Admit Date: 11/25/2023    Plan: [facility]   Discharge Plan       Row Name 12/02/23 1230       Plan    Plan Comments PT continues to have a bed at [facility]. PT will be finished with IV Abx by Monday and may return to SNF when medically ready.                   Discharge Codes    No documentation.                 Expected Discharge Date and Time       Expected Discharge Date Expected Discharge Time    Dec 1, 2023               SCOTT Fermin

## 2023-12-02 NOTE — PROGRESS NOTES
"Infectious Diseases Progress Note    Patient:  Namita Zabala  YOB: 1975  MRN: [id number]   Admit date: 11/25/2023   Admitting Physician: Arsalan Delarosa MD  Primary Care Physician: David Lara MD    Chief Complaint/Interval History: She is without fever.  She appears to be hemodynamically stable at present.  Her heart rate is in the 70s.  Blood pressure 7110 over 70s.  She is on nasal cannula oxygen.  She remains nonverbal.    Intake/Output Summary (Last 24 hours) at 12/2/2023 1051  Last data filed at 12/2/2023 0935  Gross per 24 hour   Intake 2740.66 ml   Output 4250 ml   Net -1509.34 ml     Allergies:   Allergies   Allergen Reactions    Coconut Unknown - High Severity    Levaquin [Levofloxacin] Other (See Comments)     unknown    Nuts Unknown - High Severity    Penicillins     [place] Other (See Comments)     Causes migraines per pt reports     Current Scheduled Medications:   baclofen, 20 mg, Per G Tube, Q6H  bisacodyl, 10 mg, Rectal, Daily  chlorhexidine, 15 mL, Mouth/Throat, BID  enoxaparin, 40 mg, Subcutaneous, Q24H  hydrOXYzine, 25 mg, Per G Tube, Q6H  lansoprazole, 15 mg, Per G Tube, Q AM  meropenem, 1,000 mg, Intravenous, Q8H  multivitamin with minerals, 1 tablet, Oral, Daily  polyethylene glycol, 17 g, Oral, Daily  rosuvastatin, 5 mg, Per G Tube, Daily  simethicone, 20 mg, Per G Tube, Q6H  sodium chloride, 10 mL, Intravenous, Q12H  sodium chloride, 10 mL, Intravenous, Q12H  sodium chloride, 10 mL, Intravenous, Q12H      Current PRN Medications:    acetaminophen    bisacodyl    HYDROcodone-acetaminophen    ipratropium-albuterol    ondansetron    ondansetron    sodium chloride    sodium chloride    sodium chloride    zinc oxide    Review of Systems Unobtainable from patient due to mental status    Vital Signs:  /72 (BP Location: Right leg, Patient Position: Lying)   Pulse 73   Temp 98.3 °F (36.8 °C) (Axillary)   Resp 16   Ht 152.4 cm (60\")   Wt 61 kg (134 lb " 7.7 oz)   LMP  (LMP Unknown)   SpO2 97%   BMI 26.26 kg/m²     Physical Exam  Vital signs - reviewed.  Line/IV site - No erythema, warmth, induration, or tenderness.  Lungs without crackles or wheezes  Abdomen mild distention    Lab Results:  CBC:   Results from last 7 days   Lab Units 11/30/23  0920 11/27/23  0953 11/26/23  0333 11/25/23  1800   WBC 10*3/mm3 7.52 7.72 12.79* 10.63   HEMOGLOBIN g/dL 10.3* 10.4* 12.0 12.9   HEMATOCRIT % 32.1* 32.4* 39.7 41.8   PLATELETS 10*3/mm3 183 178 265 255     BMP:  Results from last 7 days   Lab Units 11/30/23  0920 11/28/23  1027 11/27/23  0748 11/26/23  0333 11/25/23  1800   SODIUM mmol/L 139 143 146* 152* 157*   POTASSIUM mmol/L 4.4 4.3 3.9 4.1 4.0   CHLORIDE mmol/L 105 110* 112* 115* 115*   CO2 mmol/L 26.0 26.0 23.0 29.0 31.0*   BUN mg/dL 19 17 26* 41* 48*   CREATININE mg/dL 0.29* 0.28* 0.31* 0.52* 0.62   GLUCOSE mg/dL 137* 131* 125* 101* 159*   CALCIUM mg/dL 9.2 8.9 9.0 9.8 10.5   ALT (SGPT) U/L  --   --  14 19 23     Culture Results:   Blood Culture   Date Value Ref Range Status   11/25/2023 No growth at 5 days  Final   11/25/2023 Staphylococcus, coagulase negative (C)  Final     Urine Culture   Date Value Ref Range Status   11/25/2023 >100,000 CFU/mL Escherichia coli ESBL (A)  Final     Comment:       Consider infectious disease consult.  Susceptibility results may not correlate to clinical outcomes.   11/25/2023 50,000 CFU/mL Proteus mirabilis (A)  Final   11/25/2023 50,000 CFU/mL Pseudomonas aeruginosa (A)  Final   Susceptibility   Escherichia coli ESBL Proteus mirabilis Pseudomonas aeruginosa     SANDEEP SANDEEP SANDEEP     Amikacin 4 ug/ml Susceptible         Ampicillin   <=2 ug/ml Susceptible       Ampicillin + Sulbactam   <=2 ug/ml Susceptible       Cefazolin   <=4 ug/ml Susceptible       Cefepime   <=1 ug/ml Susceptible 4 ug/ml Susceptible     Ceftazidime   <=1 ug/ml Susceptible <=1 ug/ml Susceptible     Ceftriaxone   <=1 ug/ml Susceptible       Ciprofloxacin     1 ug/ml  Intermediate     Ertapenem <=0.5 ug/ml Susceptible         Gentamicin >=16 ug/ml Resistant <=1 ug/ml Susceptible <=1 ug/ml Susceptible     Levofloxacin >=8 ug/ml Resistant 4 ug/ml Resistant 2 ug/ml Intermediate     Meropenem <=0.25 ug/ml Susceptible <=0.25 ug/ml Susceptible (C) 1 <=0.25 ug/ml Susceptible (C) 1     Nitrofurantoin <=16 ug/ml Susceptible 128 ug/ml Resistant       Piperacillin + Tazobactam <=4 ug/ml Susceptible <=4 ug/ml Susceptible 8 ug/ml Susceptible     Tobramycin >=16 ug/ml Resistant         Trimethoprim + Sulfamethoxazole >=320 ug/ml Resistant <=20 ug/ml Susceptible        Radiology:     IMPRESSION:  1. The left percutaneous nephrostomy catheter is positioned  appropriately within the left renal collecting system. The proximal  portion curled within the left renal pelvis with the distal portion  curled in the left aspect of the bladder. The proximal portion of the  catheter is patent with contrast filling the proximal left renal  collecting system through the sideholes of the proximal catheter. The  ureteral portion of the catheter appears to be occluded, however  contrast does flow antegrade through the patent left ureter (adjacent to  the catheter) and into the bladder.    Additional Studies Reviewed: None    Impression:   1.  Sepsis on admission.  She had had temp over 101.  She had been on pressors briefly.  She has shown improvement.  Her white blood cell count is normal.  She is without fever.  She is hemodynamically stable.  Urine cultures with 3 different gram-negative rods.  She had received cefepime initially, Invanz for 2 days, and then has now been on Merrem.  She had improved previously on treatment with cefepime.  2.  Positive blood cultures-contaminant  3.  Ureteral stenting-stent does not appear to be functioning perfectly and that the more distal aspect appears to be occluded.  Per the nephrostogram report however she has flow around the stent from the kidney to the bladder.  4.   Functional quadriplegia and anoxic brain injury    Recommendations:   Discussed with Dr. Delarosa  Do not feel there is a definite roadmap to follow in terms of antibiotic duration  Feel it would be reasonable to continue meropenem through the a.m. dose on December 4, 2023 and then discontinue antibiotic treatment  If she remains stable would be okay with me for transfer back to her nursing home on Monday  Would suggest they make arrangements at [place] for her to have her nephrostomy tube/internal stent exchanged    Vijay Russell MD

## 2023-12-02 NOTE — PLAN OF CARE
Goal Outcome Evaluation:  Plan of Care Reviewed With: patient        Progress: no change  Outcome Evaluation: Patient non-verbal, alert, will blink at times to questions asked. vss. no s/s of pain at this time. bedrest, q2 turns. PEG tube CDI with continuous feedings at 45mL/hr, incontinent of stool. Mojica catheter and Nephrostomy tube CDI and draining well. Trach intact @ 8L/28% humidity. NPO. q6 accuchecks. Lovenox. safety maintained and continue to monitor. Left thigh PICC CDI, IVF and IV ABX given.

## 2023-12-02 NOTE — PLAN OF CARE
Goal Outcome Evaluation:  Plan of Care Reviewed With: patient        Progress: no change  Outcome Evaluation: Patient non-verbal, alert, will blink at times to questions asked. vss. no s/s of pain at this time. bedrest, q2 turns. PEG tube CDI with continuous feedings at 45mL/hr, incontinent of stool. Mojica catheter and Nephrostomy tube CDI and draining well. Trach intact @ 8L/28% humidity. NPO. q6 accuchecks. Lovenox. safety maintained and continue to monitor.

## 2023-12-02 NOTE — PROGRESS NOTES
[facility] Services  INPATIENT PROGRESS NOTE    Patient Name: Namita Zabala  Date of Admission: 11/25/2023  Today's Date: 12/02/23  Length of Stay: 7  Primary Care Physician: David Lara MD    Subjective   Chief Complaint: follow-up sepsis  HPI   Patient unable to provide any additional history.  No reported acute events from overnight.  No family at bedside.  Seems to be tolerating tube feeds.      Review of Systems   All pertinent negatives and positives are as above. All other systems have been reviewed and are negative unless otherwise stated.     Objective    Temp:  [98.3 °F (36.8 °C)-99.7 °F (37.6 °C)] 98.3 °F (36.8 °C)  Heart Rate:  [] 73  Resp:  [16] 16  BP: (100-122)/(69-78) 111/72  Physical Exam  Vitals reviewed.   Constitutional:       Appearance: She is not toxic-appearing.   HENT:      Head: Normocephalic.      Mouth/Throat:      Mouth: Mucous membranes are moist.   Eyes:      Pupils: Pupils are equal, round, and reactive to light.   Neck:      Comments: Trach and trach collar in place  Cardiovascular:      Rate and Rhythm: Normal rate.   Pulmonary:      Effort: Pulmonary effort is normal. No respiratory distress.   Genitourinary:     Comments: Mojica and left nephrostomy tube   Musculoskeletal:      Comments: Significant contractures notes; left thigh PICC in place   Skin:     General: Skin is warm.   Neurological:      Mental Status: She is alert. Mental status is at baseline.      Motor: Weakness present.      Comments: Patient unable to provide any additional history         Results Review:  I have reviewed the labs, radiology results, and diagnostic studies.    Laboratory Data:   Results from last 7 days   Lab Units 11/30/23  0920 11/27/23  0953 11/26/23  0333   WBC 10*3/mm3 7.52 7.72 12.79*   HEMOGLOBIN g/dL 10.3* 10.4* 12.0   HEMATOCRIT % 32.1* 32.4* 39.7   PLATELETS 10*3/mm3 183 178 265        Results from last 7 days   Lab  Units 11/30/23  0920 11/28/23  1027 11/27/23  0748 11/26/23  0333 11/25/23  1800   SODIUM mmol/L 139 143 146* 152* 157*   POTASSIUM mmol/L 4.4 4.3 3.9 4.1 4.0   CHLORIDE mmol/L 105 110* 112* 115* 115*   CO2 mmol/L 26.0 26.0 23.0 29.0 31.0*   BUN mg/dL 19 17 26* 41* 48*   CREATININE mg/dL 0.29* 0.28* 0.31* 0.52* 0.62   CALCIUM mg/dL 9.2 8.9 9.0 9.8 10.5   BILIRUBIN mg/dL  --   --  1.0 1.1 0.7   ALK PHOS U/L  --   --  54 58 64   ALT (SGPT) U/L  --   --  14 19 23   AST (SGOT) U/L  --   --  15 15 17   GLUCOSE mg/dL 137* 131* 125* 101* 159*       Culture Data:   Blood Culture   Date Value Ref Range Status   11/25/2023 No growth at 24 hours  Preliminary   11/25/2023 Staphylococcus, coagulase negative (C)  Final       Radiology Data:   Imaging Results (Last 24 Hours)       Procedure Component Value Units Date/Time    IR Nephrostogram [[id number]] Collected: 12/01/23 1522     Updated: 12/01/23 1539    Narrative:      IR NEPHROSTOGRAM-     HISTORY: lack of output via left nephrostomy; confirm positioning;  A41.9-Sepsis, unspecified organism; R65.20-Severe sepsis without septic  shock     COMPARISON: CT abdomen pelvis 11/25/2023     FINDINGS: Verbal consent is obtained from the patient's spouse, Grant,  over speakerphone by the performing radiologist (myself, Dr. Ricardo) and  the radiology department nurse to administer contrast via the patient's  indwelling left percutaneous nephroureteral catheter.      images obtained of the left abdomen. A gastrostomy tube is in  place. The left percutaneous nephroureteral catheter tubing appears to  be intact. Utilizing sterile technique, 10 mL of Isovue-300 contrast  were injected via the indwelling left percutaneous nephroureteral  catheter under fluoroscopy.     Fluoroscopy time 1.56 minutes  Dose 22.7 mGy  12 images saved for the study.     Contrast is injected utilizing sterile precautions into the left  percutaneous nephrostomy catheter. The proximal catheter is curled  within  the left renal pelvis. Contrast fills the proximal left renal  collecting system via the sideholes of the proximal curled catheter.  There is mild distention of the superior left renal calyx with the  contrast injection. No contrast identified within the ureteral portion  of the catheter. Patient is then placed in slightly upright orientation  (30 degrees). With time (approximately 5 minutes), contrast is  visualized flowing down the left ureter adjacent to the catheter into  the bladder. Left ureter appears intact and patent. The distal catheter  is curled within the left portion of the bladder. Mojica catheter present  at the base of the bladder.       Impression:      1. The left percutaneous nephrostomy catheter is positioned  appropriately within the left renal collecting system. The proximal  portion curled within the left renal pelvis with the distal portion  curled in the left aspect of the bladder. The proximal portion of the  catheter is patent with contrast filling the proximal left renal  collecting system through the sideholes of the proximal catheter. The  ureteral portion of the catheter appears to be occluded, however  contrast does flow antegrade through the patent left ureter (adjacent to  the catheter) and into the bladder.      This report was signed and finalized on 12/1/2023 3:36 PM by Dr. Rina Ricardo MD.               I have reviewed the patient's current medications.     Assessment/Plan   Assessment  Active Hospital Problems    Diagnosis     **Sepsis     Functional quadriplegia     Tracheostomy present     PEG tube malfunction     Severe malnutrition     Acute respiratory failure with hypoxia     Sepsis secondary to UTI     MVP (mitral valve prolapse)     Myofascial pain syndrome        Treatment Plan  Discussed with Dr. Russell  Plan to continue IV Merrem through AM dose on Monday morning and then d/c Abx treatment thereafter  1/4 bottle on blood cultures positive for coagulase-negative  staph-likely contaminant; continue to monitor off of Vancomycin  Results and images of left nephrostogram reviewed this AM with Dr. Russell  TFs for nutrition   Mojica changed 11/27  Outpatient follow-up at [facility] where patient established regarding management of left nephrostomy tube  Lovenox PPx  Bowel regimen  Palliative Care following  Plan for discharge to [facility] on Monday      Medical Decision Making  Number and Complexity of problems: Moderate complexity; patient presented with sepsis likely secondary to urinary tract infection, previous history of ESBL infection, dehydration and intravascular volume depletion with evidence of hypernatremia, in addition to hypotension requiring vasopressors with Levophed for blood pressure support.  Multiple chronic comorbidities including bedbound state, reported history of anoxic brain injury (details unknown), chronic tracheostomy and PEG tube placement; also with chronic left nephrostomy tube      Conditions and Status        Condition is unchanged     MDM Data  External documents reviewed: none  Cardiac tracing (EKG, telemetry) interpretation: no new EKGs  Radiology interpretation: no new radiology studies  Labs reviewed: as above  Any tests that were considered but not ordered: None at this time     Decision rules/scores evaluated (example [id number], [place], etc): None at this time     Discussed with: Dr. Russell with infectious disease     Care Planning  Shared decision making: discussed with SW regarding dispo plan.  No family at bedside this morning  Code status and discussions: DO NOT RESUSCITATE and DO NOT INTUBATE    Disposition  Social Determinants of Health that impact treatment or disposition: Patient is from [facility] facility  I expected the patient to be discharged to [facility] on Monday, 12/4    Electronically signed by Arsalan Delarosa MD, 12/02/23, 11:54 CST.

## 2023-12-03 ENCOUNTER — APPOINTMENT (OUTPATIENT)
Dept: CT IMAGING | Facility: HOSPITAL | Age: 48
DRG: 698 | End: 2023-12-03
Payer: MEDICARE

## 2023-12-03 ENCOUNTER — APPOINTMENT (OUTPATIENT)
Dept: GENERAL RADIOLOGY | Facility: HOSPITAL | Age: 48
DRG: 698 | End: 2023-12-03
Payer: MEDICARE

## 2023-12-03 LAB
ALBUMIN SERPL-MCNC: 3.9 G/DL (ref 3.5–5.2)
ALBUMIN/GLOB SERPL: 1.1 G/DL
ALP SERPL-CCNC: 79 U/L (ref 39–117)
ALT SERPL W P-5'-P-CCNC: 20 U/L (ref 1–33)
ANION GAP SERPL CALCULATED.3IONS-SCNC: 12 MMOL/L (ref 5–15)
AST SERPL-CCNC: 16 U/L (ref 1–32)
BASOPHILS # BLD AUTO: 0.06 10*3/MM3 (ref 0–0.2)
BASOPHILS NFR BLD AUTO: 0.6 % (ref 0–1.5)
BILIRUB SERPL-MCNC: 0.5 MG/DL (ref 0–1.2)
BUN SERPL-MCNC: 19 MG/DL (ref 6–20)
BUN/CREAT SERPL: 46.3 (ref 7–25)
CALCIUM SPEC-SCNC: 10.1 MG/DL (ref 8.6–10.5)
CHLORIDE SERPL-SCNC: 101 MMOL/L (ref 98–107)
CO2 SERPL-SCNC: 27 MMOL/L (ref 22–29)
CREAT SERPL-MCNC: 0.41 MG/DL (ref 0.57–1)
DEPRECATED RDW RBC AUTO: 44.8 FL (ref 37–54)
EGFRCR SERPLBLD CKD-EPI 2021: 123.1 ML/MIN/1.73
EOSINOPHIL # BLD AUTO: 0.35 10*3/MM3 (ref 0–0.4)
EOSINOPHIL NFR BLD AUTO: 3.7 % (ref 0.3–6.2)
ERYTHROCYTE [DISTWIDTH] IN BLOOD BY AUTOMATED COUNT: 13.1 % (ref 12.3–15.4)
GLOBULIN UR ELPH-MCNC: 3.6 GM/DL
GLUCOSE BLDC GLUCOMTR-MCNC: 111 MG/DL (ref 70–130)
GLUCOSE BLDC GLUCOMTR-MCNC: 124 MG/DL (ref 70–130)
GLUCOSE BLDC GLUCOMTR-MCNC: 128 MG/DL (ref 70–130)
GLUCOSE SERPL-MCNC: 182 MG/DL (ref 65–99)
HCT VFR BLD AUTO: 36.5 % (ref 34–46.6)
HGB BLD-MCNC: 11.8 G/DL (ref 12–15.9)
IMM GRANULOCYTES # BLD AUTO: 0.08 10*3/MM3 (ref 0–0.05)
IMM GRANULOCYTES NFR BLD AUTO: 0.8 % (ref 0–0.5)
LYMPHOCYTES # BLD AUTO: 1.51 10*3/MM3 (ref 0.7–3.1)
LYMPHOCYTES NFR BLD AUTO: 15.8 % (ref 19.6–45.3)
MCH RBC QN AUTO: 30.5 PG (ref 26.6–33)
MCHC RBC AUTO-ENTMCNC: 32.3 G/DL (ref 31.5–35.7)
MCV RBC AUTO: 94.3 FL (ref 79–97)
MONOCYTES # BLD AUTO: 0.85 10*3/MM3 (ref 0.1–0.9)
MONOCYTES NFR BLD AUTO: 8.9 % (ref 5–12)
NEUTROPHILS NFR BLD AUTO: 6.72 10*3/MM3 (ref 1.7–7)
NEUTROPHILS NFR BLD AUTO: 70.2 % (ref 42.7–76)
NRBC BLD AUTO-RTO: 0 /100 WBC (ref 0–0.2)
PLATELET # BLD AUTO: 260 10*3/MM3 (ref 140–450)
PMV BLD AUTO: 10.9 FL (ref 6–12)
POTASSIUM SERPL-SCNC: 4.4 MMOL/L (ref 3.5–5.2)
PROT SERPL-MCNC: 7.5 G/DL (ref 6–8.5)
RBC # BLD AUTO: 3.87 10*6/MM3 (ref 3.77–5.28)
SODIUM SERPL-SCNC: 140 MMOL/L (ref 136–145)
WBC NRBC COR # BLD AUTO: 9.57 10*3/MM3 (ref 3.4–10.8)

## 2023-12-03 PROCEDURE — 25810000003 LACTATED RINGERS PER 1000 ML: Performed by: INTERNAL MEDICINE

## 2023-12-03 PROCEDURE — 25010000002 ENOXAPARIN PER 10 MG: Performed by: INTERNAL MEDICINE

## 2023-12-03 PROCEDURE — 80053 COMPREHEN METABOLIC PANEL: CPT | Performed by: INTERNAL MEDICINE

## 2023-12-03 PROCEDURE — 99232 SBSQ HOSP IP/OBS MODERATE 35: CPT | Performed by: INTERNAL MEDICINE

## 2023-12-03 PROCEDURE — 82948 REAGENT STRIP/BLOOD GLUCOSE: CPT

## 2023-12-03 PROCEDURE — 25010000002 MORPHINE PER 10 MG: Performed by: INTERNAL MEDICINE

## 2023-12-03 PROCEDURE — 25510000001 IOPAMIDOL 61 % SOLUTION: Performed by: INTERNAL MEDICINE

## 2023-12-03 PROCEDURE — 71045 X-RAY EXAM CHEST 1 VIEW: CPT

## 2023-12-03 PROCEDURE — 74178 CT ABD&PLV WO CNTR FLWD CNTR: CPT

## 2023-12-03 PROCEDURE — 94761 N-INVAS EAR/PLS OXIMETRY MLT: CPT

## 2023-12-03 PROCEDURE — 85025 COMPLETE CBC W/AUTO DIFF WBC: CPT | Performed by: INTERNAL MEDICINE

## 2023-12-03 PROCEDURE — 94799 UNLISTED PULMONARY SVC/PX: CPT

## 2023-12-03 PROCEDURE — 25010000002 MEROPENEM PER 100 MG: Performed by: INTERNAL MEDICINE

## 2023-12-03 RX ORDER — MORPHINE SULFATE 2 MG/ML
1 INJECTION, SOLUTION INTRAMUSCULAR; INTRAVENOUS ONCE
Status: COMPLETED | OUTPATIENT
Start: 2023-12-03 | End: 2023-12-03

## 2023-12-03 RX ADMIN — HYDROCODONE BITARTRATE AND ACETAMINOPHEN 1 TABLET: 5; 325 TABLET ORAL at 06:02

## 2023-12-03 RX ADMIN — MEROPENEM 1000 MG: 1 INJECTION, POWDER, FOR SOLUTION INTRAVENOUS at 23:19

## 2023-12-03 RX ADMIN — HYDROCODONE BITARTRATE AND ACETAMINOPHEN 1 TABLET: 5; 325 TABLET ORAL at 11:53

## 2023-12-03 RX ADMIN — MEROPENEM 1000 MG: 1 INJECTION, POWDER, FOR SOLUTION INTRAVENOUS at 16:39

## 2023-12-03 RX ADMIN — Medication 10 ML: at 09:03

## 2023-12-03 RX ADMIN — HYDROXYZINE HYDROCHLORIDE 25 MG: 25 TABLET ORAL at 00:00

## 2023-12-03 RX ADMIN — CHLORHEXIDINE GLUCONATE 15 ML: 1.2 SOLUTION ORAL at 09:04

## 2023-12-03 RX ADMIN — HYDROCODONE BITARTRATE AND ACETAMINOPHEN 1 TABLET: 5; 325 TABLET ORAL at 00:51

## 2023-12-03 RX ADMIN — IOPAMIDOL 100 ML: 612 INJECTION, SOLUTION INTRAVENOUS at 11:51

## 2023-12-03 RX ADMIN — ROSUVASTATIN CALCIUM 5 MG: 5 TABLET, FILM COATED ORAL at 09:03

## 2023-12-03 RX ADMIN — POLYETHYLENE GLYCOL 3350 17 G: 17 POWDER, FOR SOLUTION ORAL at 09:03

## 2023-12-03 RX ADMIN — Medication 20 MG: at 11:39

## 2023-12-03 RX ADMIN — HYDROXYZINE HYDROCHLORIDE 25 MG: 25 TABLET ORAL at 06:02

## 2023-12-03 RX ADMIN — MORPHINE SULFATE 1 MG: 2 INJECTION, SOLUTION INTRAMUSCULAR; INTRAVENOUS at 02:26

## 2023-12-03 RX ADMIN — BACLOFEN 20 MG: 10 TABLET ORAL at 16:39

## 2023-12-03 RX ADMIN — HYDROCODONE BITARTRATE AND ACETAMINOPHEN 1 TABLET: 5; 325 TABLET ORAL at 17:05

## 2023-12-03 RX ADMIN — BACLOFEN 20 MG: 10 TABLET ORAL at 06:02

## 2023-12-03 RX ADMIN — BISACODYL 10 MG: 10 SUPPOSITORY RECTAL at 09:03

## 2023-12-03 RX ADMIN — Medication 20 MG: at 06:03

## 2023-12-03 RX ADMIN — Medication 20 MG: at 16:40

## 2023-12-03 RX ADMIN — Medication 10 ML: at 23:20

## 2023-12-03 RX ADMIN — BACLOFEN 20 MG: 10 TABLET ORAL at 00:00

## 2023-12-03 RX ADMIN — BACLOFEN 20 MG: 10 TABLET ORAL at 11:39

## 2023-12-03 RX ADMIN — Medication 20 MG: at 00:02

## 2023-12-03 RX ADMIN — LANSOPRAZOLE 15 MG: 15 TABLET, ORALLY DISINTEGRATING ORAL at 06:02

## 2023-12-03 RX ADMIN — HYDROXYZINE HYDROCHLORIDE 25 MG: 25 TABLET ORAL at 11:39

## 2023-12-03 RX ADMIN — SODIUM CHLORIDE, POTASSIUM CHLORIDE, SODIUM LACTATE AND CALCIUM CHLORIDE 50 ML/HR: 600; 310; 30; 20 INJECTION, SOLUTION INTRAVENOUS at 02:26

## 2023-12-03 RX ADMIN — HYDROXYZINE HYDROCHLORIDE 25 MG: 25 TABLET ORAL at 17:44

## 2023-12-03 RX ADMIN — Medication 1 TABLET: at 09:03

## 2023-12-03 RX ADMIN — MEROPENEM 1000 MG: 1 INJECTION, POWDER, FOR SOLUTION INTRAVENOUS at 06:02

## 2023-12-03 RX ADMIN — Medication 20 MG: at 23:21

## 2023-12-03 RX ADMIN — BACLOFEN 20 MG: 10 TABLET ORAL at 23:19

## 2023-12-03 RX ADMIN — HYDROXYZINE HYDROCHLORIDE 25 MG: 25 TABLET ORAL at 23:19

## 2023-12-03 RX ADMIN — ENOXAPARIN SODIUM 40 MG: 100 INJECTION SUBCUTANEOUS at 16:39

## 2023-12-03 NOTE — PLAN OF CARE
Goal Outcome Evaluation:  Plan of Care Reviewed With: patient, spouse        Progress: no change  Outcome Evaluation: Patient non-verbal, alert, will blink at times to questions asked. vss. no s/s of pain at this time. bedrest, q2 turns. PEG tube CDI with continuous feedings at 45mL/hr, incontinent of stool. Mojica catheter and Nephrostomy tube CDI and draining well. Trach intact @ 8L/28% humidity. NPO. q6 accuchecks. Lovenox. safety maintained and continue to monitor.

## 2023-12-03 NOTE — PLAN OF CARE
Goal Outcome Evaluation:  Plan of Care Reviewed With: patient        Progress: no change          Pt with nonverbal signs of pain throughout the night; prn pain meds given per order; MD notified; see orders. Pt with heart rate sustaining in the 120-130s throughout the night; MD notified; tele placed; see orders. O2@8L on trach collar. NPO. Continuous tube feeds and meds crushed through PEG tube. Mojica cath in place to SDB. Nephrostomy tube with output. IVF and abx infusing through femoral PICC line. Contact precautions maintained. Turn Q2H. Safety maintained.

## 2023-12-04 PROBLEM — A40.1 SEPSIS DUE TO GROUP B STREPTOCOCCUS WITHOUT ACUTE ORGAN DYSFUNCTION: Status: ACTIVE | Noted: 2023-12-04

## 2023-12-04 LAB
ANION GAP SERPL CALCULATED.3IONS-SCNC: 9 MMOL/L (ref 5–15)
BASOPHILS # BLD AUTO: 0.05 10*3/MM3 (ref 0–0.2)
BASOPHILS NFR BLD AUTO: 0.7 % (ref 0–1.5)
BUN SERPL-MCNC: 19 MG/DL (ref 6–20)
BUN/CREAT SERPL: 65.5 (ref 7–25)
CALCIUM SPEC-SCNC: 9.7 MG/DL (ref 8.6–10.5)
CHLORIDE SERPL-SCNC: 101 MMOL/L (ref 98–107)
CO2 SERPL-SCNC: 27 MMOL/L (ref 22–29)
CREAT SERPL-MCNC: 0.29 MG/DL (ref 0.57–1)
DEPRECATED RDW RBC AUTO: 46.9 FL (ref 37–54)
EGFRCR SERPLBLD CKD-EPI 2021: 133.8 ML/MIN/1.73
EOSINOPHIL # BLD AUTO: 0.51 10*3/MM3 (ref 0–0.4)
EOSINOPHIL NFR BLD AUTO: 7.2 % (ref 0.3–6.2)
ERYTHROCYTE [DISTWIDTH] IN BLOOD BY AUTOMATED COUNT: 13.4 % (ref 12.3–15.4)
GLUCOSE BLDC GLUCOMTR-MCNC: 102 MG/DL (ref 70–130)
GLUCOSE BLDC GLUCOMTR-MCNC: 112 MG/DL (ref 70–130)
GLUCOSE BLDC GLUCOMTR-MCNC: 99 MG/DL (ref 70–130)
GLUCOSE SERPL-MCNC: 118 MG/DL (ref 65–99)
HCT VFR BLD AUTO: 32.5 % (ref 34–46.6)
HGB BLD-MCNC: 10 G/DL (ref 12–15.9)
IMM GRANULOCYTES # BLD AUTO: 0.07 10*3/MM3 (ref 0–0.05)
IMM GRANULOCYTES NFR BLD AUTO: 1 % (ref 0–0.5)
LYMPHOCYTES # BLD AUTO: 2.05 10*3/MM3 (ref 0.7–3.1)
LYMPHOCYTES NFR BLD AUTO: 29 % (ref 19.6–45.3)
MCH RBC QN AUTO: 29.5 PG (ref 26.6–33)
MCHC RBC AUTO-ENTMCNC: 30.8 G/DL (ref 31.5–35.7)
MCV RBC AUTO: 95.9 FL (ref 79–97)
MONOCYTES # BLD AUTO: 0.62 10*3/MM3 (ref 0.1–0.9)
MONOCYTES NFR BLD AUTO: 8.8 % (ref 5–12)
NEUTROPHILS NFR BLD AUTO: 3.76 10*3/MM3 (ref 1.7–7)
NEUTROPHILS NFR BLD AUTO: 53.3 % (ref 42.7–76)
NRBC BLD AUTO-RTO: 0 /100 WBC (ref 0–0.2)
PLATELET # BLD AUTO: 257 10*3/MM3 (ref 140–450)
PMV BLD AUTO: 11.3 FL (ref 6–12)
POTASSIUM SERPL-SCNC: 4.2 MMOL/L (ref 3.5–5.2)
RBC # BLD AUTO: 3.39 10*6/MM3 (ref 3.77–5.28)
SODIUM SERPL-SCNC: 137 MMOL/L (ref 136–145)
WBC NRBC COR # BLD AUTO: 7.06 10*3/MM3 (ref 3.4–10.8)

## 2023-12-04 PROCEDURE — 94799 UNLISTED PULMONARY SVC/PX: CPT

## 2023-12-04 PROCEDURE — 80048 BASIC METABOLIC PNL TOTAL CA: CPT | Performed by: INTERNAL MEDICINE

## 2023-12-04 PROCEDURE — 85025 COMPLETE CBC W/AUTO DIFF WBC: CPT | Performed by: INTERNAL MEDICINE

## 2023-12-04 PROCEDURE — 25010000002 MEROPENEM PER 100 MG: Performed by: INTERNAL MEDICINE

## 2023-12-04 PROCEDURE — 82948 REAGENT STRIP/BLOOD GLUCOSE: CPT

## 2023-12-04 PROCEDURE — 99231 SBSQ HOSP IP/OBS SF/LOW 25: CPT | Performed by: INTERNAL MEDICINE

## 2023-12-04 PROCEDURE — 25010000002 ENOXAPARIN PER 10 MG: Performed by: INTERNAL MEDICINE

## 2023-12-04 PROCEDURE — 25810000003 LACTATED RINGERS PER 1000 ML: Performed by: INTERNAL MEDICINE

## 2023-12-04 RX ORDER — IPRATROPIUM BROMIDE AND ALBUTEROL SULFATE 2.5; .5 MG/3ML; MG/3ML
3 SOLUTION RESPIRATORY (INHALATION) EVERY 4 HOURS PRN
Start: 2023-12-04

## 2023-12-04 RX ORDER — HYDROXYZINE HYDROCHLORIDE 25 MG/1
25 TABLET, FILM COATED ORAL EVERY 6 HOURS PRN
Start: 2023-12-04

## 2023-12-04 RX ORDER — NALOXONE HYDROCHLORIDE 4 MG/.1ML
SPRAY NASAL
Qty: 2 EACH | Refills: 0 | Status: SHIPPED | OUTPATIENT
Start: 2023-12-04

## 2023-12-04 RX ORDER — MULTIPLE VITAMINS W/ MINERALS TAB 9MG-400MCG
1 TAB ORAL DAILY
Start: 2023-12-04

## 2023-12-04 RX ORDER — HYDROCODONE BITARTRATE AND ACETAMINOPHEN 5; 325 MG/1; MG/1
1 TABLET ORAL EVERY 6 HOURS PRN
Qty: 24 TABLET | Refills: 0 | Status: SHIPPED | OUTPATIENT
Start: 2023-12-04

## 2023-12-04 RX ORDER — ZINC OXIDE
1 OINTMENT (GRAM) TOPICAL EVERY 4 HOURS PRN
Start: 2023-12-04

## 2023-12-04 RX ADMIN — Medication 20 MG: at 23:46

## 2023-12-04 RX ADMIN — SODIUM CHLORIDE, POTASSIUM CHLORIDE, SODIUM LACTATE AND CALCIUM CHLORIDE 50 ML/HR: 600; 310; 30; 20 INJECTION, SOLUTION INTRAVENOUS at 02:06

## 2023-12-04 RX ADMIN — SODIUM CHLORIDE, POTASSIUM CHLORIDE, SODIUM LACTATE AND CALCIUM CHLORIDE 50 ML/HR: 600; 310; 30; 20 INJECTION, SOLUTION INTRAVENOUS at 23:43

## 2023-12-04 RX ADMIN — CHLORHEXIDINE GLUCONATE 15 ML: 1.2 SOLUTION ORAL at 09:54

## 2023-12-04 RX ADMIN — Medication 10 ML: at 23:44

## 2023-12-04 RX ADMIN — Medication 10 ML: at 09:55

## 2023-12-04 RX ADMIN — CHLORHEXIDINE GLUCONATE 15 ML: 1.2 SOLUTION ORAL at 23:43

## 2023-12-04 RX ADMIN — HYDROXYZINE HYDROCHLORIDE 25 MG: 25 TABLET ORAL at 06:01

## 2023-12-04 RX ADMIN — HYDROCODONE BITARTRATE AND ACETAMINOPHEN 1 TABLET: 5; 325 TABLET ORAL at 02:05

## 2023-12-04 RX ADMIN — Medication 20 MG: at 18:40

## 2023-12-04 RX ADMIN — POLYETHYLENE GLYCOL 3350 17 G: 17 POWDER, FOR SOLUTION ORAL at 09:54

## 2023-12-04 RX ADMIN — Medication 10 ML: at 09:54

## 2023-12-04 RX ADMIN — Medication 20 MG: at 11:54

## 2023-12-04 RX ADMIN — MEROPENEM 1000 MG: 1 INJECTION, POWDER, FOR SOLUTION INTRAVENOUS at 14:55

## 2023-12-04 RX ADMIN — MEROPENEM 1000 MG: 1 INJECTION, POWDER, FOR SOLUTION INTRAVENOUS at 06:01

## 2023-12-04 RX ADMIN — BACLOFEN 20 MG: 10 TABLET ORAL at 06:01

## 2023-12-04 RX ADMIN — BACLOFEN 20 MG: 10 TABLET ORAL at 18:40

## 2023-12-04 RX ADMIN — BACLOFEN 20 MG: 10 TABLET ORAL at 11:42

## 2023-12-04 RX ADMIN — BISACODYL 10 MG: 10 SUPPOSITORY RECTAL at 09:54

## 2023-12-04 RX ADMIN — HYDROXYZINE HYDROCHLORIDE 25 MG: 25 TABLET ORAL at 18:40

## 2023-12-04 RX ADMIN — HYDROXYZINE HYDROCHLORIDE 25 MG: 25 TABLET ORAL at 23:42

## 2023-12-04 RX ADMIN — Medication 1 TABLET: at 09:54

## 2023-12-04 RX ADMIN — BACLOFEN 20 MG: 10 TABLET ORAL at 23:42

## 2023-12-04 RX ADMIN — ENOXAPARIN SODIUM 40 MG: 100 INJECTION SUBCUTANEOUS at 14:55

## 2023-12-04 RX ADMIN — ROSUVASTATIN CALCIUM 5 MG: 5 TABLET, FILM COATED ORAL at 09:54

## 2023-12-04 RX ADMIN — Medication 20 MG: at 06:02

## 2023-12-04 RX ADMIN — LANSOPRAZOLE 15 MG: 15 TABLET, ORALLY DISINTEGRATING ORAL at 06:01

## 2023-12-04 RX ADMIN — HYDROXYZINE HYDROCHLORIDE 25 MG: 25 TABLET ORAL at 11:42

## 2023-12-04 RX ADMIN — MEROPENEM 1000 MG: 1 INJECTION, POWDER, FOR SOLUTION INTRAVENOUS at 23:43

## 2023-12-04 NOTE — PLAN OF CARE
Problem: Palliative Care  Goal: Enhanced Quality of Life  Outcome: Ongoing, Progressing    Patient is resting. VSS. Patient does not appear to be in any distress or discomfort. Anticipate discharge. MOST form has been completed and code status delineated.     Will continue to follow as needed.     SCOTT Krause, APHSW-C  12/4/2023

## 2023-12-04 NOTE — PLAN OF CARE
Goal Outcome Evaluation:  Plan of Care Reviewed With: patient        Progress: no change          Pt contracted and nonverbal. Pt has seemed in minimal pain this shift; see MAR. IVF and abx infusing through femoral PICC per order. Continuous tube feed and meds crushed through PEG tube. O2@8L on trach collar. Accu check. Mojica and nephrostomy tube with output. Safety maintained. Possible discharge later today.

## 2023-12-04 NOTE — CASE MANAGEMENT/SOCIAL WORK
Continued Stay Note   [place]     Patient Name: Namita Zabala  MRN: [id number]  Today's Date: 12/4/2023    Admit Date: 11/25/2023    Plan: SNf   Discharge Plan       Row Name 12/04/23 0950       Plan    Plan SNf    Plan Comments Had a message from pt's spouse now requesting a referral to [facility]. Referral sent.                   Discharge Codes    No documentation.                 Expected Discharge Date and Time       Expected Discharge Date Expected Discharge Time    Dec 1, 2023               ORAL Mcintyre

## 2023-12-04 NOTE — DISCHARGE SUMMARY
[facility] Medicine Services  DISCHARGE SUMMARY       Date of Admission: 11/25/2023  Date of Discharge:  12/5/2023  Primary Care Physician: David Lara MD    Presenting Problem/History of Present Illness:      Final Discharge Diagnoses:  Active Hospital Problems    Diagnosis     **Sepsis     Sepsis due to group B Streptococcus without acute organ dysfunction     Functional quadriplegia     Tracheostomy present     PEG tube malfunction     Severe malnutrition     Acute respiratory failure with hypoxia     Sepsis secondary to UTI     MVP (mitral valve prolapse)     Myofascial pain syndrome        Consults: Infectious disease . urology.  Palliative care.  Nutrition.  Vascular access.    Pertinent Test Results:   Results for orders placed during the hospital encounter of 08/27/21    Adult Transthoracic Echo Complete W/ Cont if Necessary Per Protocol    Interpretation Summary  · Estimated left ventricular EF = 60% Left ventricular systolic function is normal.  · Left ventricular diastolic function is consistent with (grade I) impaired relaxation.  · The right ventricular cavity is mildly dilated. Systolic function is normal.  · The right atrial cavity is mildly dilated.  · There is mild aortic and tricuspid valve regurgitation present.  · Estimated right ventricular systolic pressure from tricuspid regurgitation is normal (<35 mmHg).      Imaging Results (All)       Procedure Component Value Units Date/Time    CT Abdomen Pelvis With & Without Contrast [[id number]] Collected: 12/03/23 1224     Updated: 12/03/23 1241    Narrative:      CT ABDOMEN PELVIS W WO CONTRAST- 12/3/2023 11:28 AM     HISTORY: Abdominal pain, acute, nonlocalized; A41.9-Sepsis, unspecified  organism; R65.20-Severe sepsis without septic shock      COMPARISON: 11/25/2023     DOSE LENGTH PRODUCT: 1343 mGy cm. Automated exposure control was also  utilized to decrease patient radiation dose.     TECHNIQUE:  Axial images of the abdomen and pelvis are performed with and  without without IV contrast     FINDINGS: Probable bibasilar atelectasis. Elevated right hemidiaphragm.     The liver, spleen, pancreas, and gallbladder are unremarkable. There is  increasing distention of the right renal collecting system with  nonobstructing right intrarenal stones, largest of the distended right  renal pelvis seen dependently measuring 1.2 cm. There are no right  ureteral stones localized. A left percutaneous nephroureteral catheter  remains in place. Punctate nonobstructing dependent left intrarenal  stones.  No left ureteral stone seen adjacent to the catheter. Similar  enhancement of the uroepithelium. Mojica catheter within the decompressed  bladder. Uterus is unremarkable. No adnexal mass.     Gastrostomy tube within the distal stomach. No small bowel dilatation.  Large volume of stool present throughout the colon from cecum to rectum  strongly favoring constipation. Appendix is normal. There is no small  bowel dilatation. There is a chronic laxity of the left lower abdominal  wall abdominal aorta is normal in caliber. No pathologic lymphadenopathy  visualized.     Chronic changes of the bony pelvis and hips. No acute regional bony  pathology.       Impression:      1. There are nonobstructing bilateral intrarenal stones, the largest  positioned within the dependent portion of the right renal pelvis  measuring 1.2 cm. There is increasing distention of the proximal right  renal collecting system, now moderate in severity, when compared to  11/25/2023, however no right ureteral stones are localized. Left  nephroureteral catheter remains in place, appropriate in position. Mojica  catheter within a decompressed bladder.      2. Large volume of stool throughout the colon consistent with  constipation. Normal appendix. Chronic laxity of the left lower  abdominal/pelvic wall. Gastrostomy tube in place.     This report was signed and  finalized on 12/3/2023 12:38 PM by Dr. Rina Ricardo MD.       XR Chest 1 View [[id number]] Collected: 12/03/23 0811     Updated: 12/03/23 0816    Narrative:      XR CHEST 1 VW-     HISTORY: hypoxemia; A41.9-Sepsis, unspecified organism; R65.20-Severe  sepsis without septic shock     COMPARISON: 11/25/2023     FINDINGS: Frontal view the chest obtained. Patient with multiple  contractures, the head and neck project over the right lung diminishing  assessment of the right lung parenchyma.     A tracheostomy tube remains in place. Suboptimal assessment of the right  lung. Questionable left basal atelectasis. No evidence of pulmonary  edema within the left lung. No pleural effusion. No pneumothorax  identified.     Gastrostomy tube and left percutaneous nephroureteral catheter  visualized within the upper abdomen. Moderate to large volume of gas and  stool within the visible colon.       Impression:      1. Multiple contractures of the patient resulting in superimposition of  the head and neck over the right lung with diminished evaluation of the  right lung parenchyma. Mild left basilar atelectasis. Tracheostomy tube  remains in place.        This report was signed and finalized on 12/3/2023 8:13 AM by Dr. Rina Ricardo MD.       IR Nephrostogram [[id number]] Collected: 12/01/23 1522     Updated: 12/01/23 1539    Narrative:      IR NEPHROSTOGRAM-     HISTORY: lack of output via left nephrostomy; confirm positioning;  A41.9-Sepsis, unspecified organism; R65.20-Severe sepsis without septic  shock     COMPARISON: CT abdomen pelvis 11/25/2023     FINDINGS: Verbal consent is obtained from the patient's spouse, Grant,  over speakerphone by the performing radiologist (myself, Dr. Ricardo) and  the radiology department nurse to administer contrast via the patient's  indwelling left percutaneous nephroureteral catheter.      images obtained of the left abdomen. A gastrostomy tube is in  place. The left percutaneous  nephroureteral catheter tubing appears to  be intact. Utilizing sterile technique, 10 mL of Isovue-300 contrast  were injected via the indwelling left percutaneous nephroureteral  catheter under fluoroscopy.     Fluoroscopy time 1.56 minutes  Dose 22.7 mGy  12 images saved for the study.     Contrast is injected utilizing sterile precautions into the left  percutaneous nephrostomy catheter. The proximal catheter is curled  within the left renal pelvis. Contrast fills the proximal left renal  collecting system via the sideholes of the proximal curled catheter.  There is mild distention of the superior left renal calyx with the  contrast injection. No contrast identified within the ureteral portion  of the catheter. Patient is then placed in slightly upright orientation  (30 degrees). With time (approximately 5 minutes), contrast is  visualized flowing down the left ureter adjacent to the catheter into  the bladder. Left ureter appears intact and patent. The distal catheter  is curled within the left portion of the bladder. Mojica catheter present  at the base of the bladder.       Impression:      1. The left percutaneous nephrostomy catheter is positioned  appropriately within the left renal collecting system. The proximal  portion curled within the left renal pelvis with the distal portion  curled in the left aspect of the bladder. The proximal portion of the  catheter is patent with contrast filling the proximal left renal  collecting system through the sideholes of the proximal catheter. The  ureteral portion of the catheter appears to be occluded, however  contrast does flow antegrade through the patent left ureter (adjacent to  the catheter) and into the bladder.      This report was signed and finalized on 12/1/2023 3:36 PM by Dr. Rina Ricardo MD.       XR Abdomen KUB [[id number]] Collected: 11/27/23 1121     Updated: 11/27/23 1133    Narrative:      EXAM: XR ABDOMEN KUB-      DATE: 11/27/2023 10:29 AM CST      HISTORY: constipation; A41.9-Sepsis, unspecified organism; R65.20-Severe  sepsis without septic shock       COMPARISON: 11/26/2023, 11/25/2023.     TECHNIQUE:  Supine view of the abdomen. 2 images.     FINDINGS:    Limited evaluation for free air on supine imaging. Nonobstructive supine  bowel gas pattern. Moderate amount of colonic gas and stool. PEG tube.     There is a 1.6 cm calcification projecting at the RIGHT renal pelvis,  similar compared to 11/25/2023 CT.     LEFT percutaneous nephrostomy tube with ureteral stent appears in  similar position.     Mojica catheter.     Interval placement of LEFT vascular catheter, tip projecting at the  level of L4.     Degenerative changes of the spine and pelvis. Sclerotic focus in the  LEFT greater trochanter appears similar to 9/14/2021, not optimally  evaluated on this exam.          Impression:      1. Interval placement LEFT femoral vascular catheter, tip projecting at  the level of L4. This appears to project LEFT of midline, which could be  seen with arterial placement, but evaluation is limited due to rotation.  Recommend clinical correlation.  2. LEFT percutaneous nephrostomy tube with ureteral stent appears in  similar position compared to prior.  3. Similar 1.6 cm RIGHT renal pelvis calcification.  4. Moderate amount of clonic stool.     This report was signed and finalized on 11/27/2023 11:30 AM CST by Dr Dea Barksdale MD.       XR Abdomen KUB [[id number]] Collected: 11/26/23 1007     Updated: 11/26/23 1020    Narrative:      EXAMINATION: XR ABDOMEN KUB- 11/26/2023 10:07 AM CST     HISTORY: post mid thigh PICC placement; A41.9-Sepsis, unspecified  organism; R65.20-Severe sepsis without septic shock.     REPORT: Supine imaging of the abdomen was performed.     COMPARISON: KUB 4/20/2022.     The patient is in a fetal position, a new vascular access catheter is  noted, from a left lower extremity approach, the catheter takes a course  along the left margin of  the spine,, this could be within the arterial  system and clinical correlation is recommended, including blood gases  drawn from this catheter if clinically appropriate. A left-sided  nephroureterostomy catheter is present.       Impression:      A new vascular access catheter is seen over the left pelvis,  inserted from the left lower extremity , the catheter is not clearly  within the venous system. Clinical correlation is recommended, including  blood gases drawn from this catheter if necessary.     REPORT called to the ICU at the time of dictation.     This report was signed and finalized on 11/26/2023 10:17 AM CST by Dr. Florian Laurent MD.       CT Abdomen Pelvis With Contrast [[id number]] Collected: 11/25/23 2018     Updated: 11/25/23 2035    Narrative:      CT ABDOMEN PELVIS W CONTRAST- 11/25/2023 7:43 PM CST     HISTORY: nephrostomy tube, UTI, septic       COMPARISON: 10/8/2023.     DLP: 854 mGy cm. All CT scans are performed using dose optimization  techniques as appropriate to the performed exam and including at least  one of the following: Automated exposure control, adjustment of the mA  and/or kV according to size, and the use of the iterative reconstruction  technique.     TECHNIQUE: Following the intravenous administration of contrast, helical  CT tomographic images of the abdomen and pelvis were acquired. Coronal  reformatted images were also provided for review.     FINDINGS:  Bibasilar atelectasis is present. The base of the heart is unremarkable.  There is no pericardial effusion..     LIVER: No focal liver lesion. The hepatic vasculature is patent.     BILIARY SYSTEM: The gallbladder is unremarkable. No intrahepatic or  extrahepatic ductal dilatation.     PANCREAS: No focal pancreatic lesion.     SPLEEN: Unremarkable.     KIDNEYS AND ADRENALS: The adrenals are unremarkable. Bilateral  nephrolithiasis is again demonstrated with mild to moderate dilatation  of the upper tracts of both kidneys. A  stone within the right renal  pelvis measures 1.2 cm in size. As noted previously there is mild  thickening of the urothelium within the renal pelvis bilaterally with  adjacent mild inflammation. The right ureter is dilated in its proximal  segment. The more distal right ureter is decompressed. No discrete  intraureteral calculus is identified. FINDINGS within the right kidney,  collecting system and ureter I feel are similar to the previous exam of  10/8/2023. A left-sided nephrostomy tube remains in place with the  proximal pigtail curled within the left renal pelvis. An intraureteral  stent is also present on the left with a distal pigtail catheter in the  urinary bladder.. No calcifications are noted over the normal course of  either ureter..     RETROPERITONEUM: No mass, lymphadenopathy or hemorrhage.     GI TRACT: There is moderate constipation including mild fecal impaction  within the rectal vault. No evidence of mechanical obstruction. There is  a periumbilical hernia. There is a loop of small bowel which protrudes  into the hernia but without evidence of incarceration or obstruction.  There is also diastases of the abdominal musculature within the anterior  lower left pelvis with protrusion of the colon and small bowel as noted  previously. Again no evidence of incarceration or associated  obstruction.. The appendix is not clearly identified. A gastrostomy tube  remains well-positioned. No gastric wall thickening or gastric outlet  obstruction..     OTHER: There is no mesenteric mass, lymphadenopathy or fluid collection.  The abdominopelvic vasculature is patent. The osseous structures and  soft tissues demonstrate no worrisome lesions. No free fluid or  localized inflammation is present.     PELVIS: No adnexal mass or free fluid.. A Mojica catheter is in place  within the bladder. The bladder is evacuated but does contain some air..       Impression:      1. A left-sided nephrostomy tube remains in place  with the proximal  pigtail projecting over the left renal pelvis. There is also an  intraureteral stent component to this catheter with the distal pigtail  within the evacuated urinary bladder. Bilateral nephrolithiasis is again  demonstrated including a stone within the right renal pelvis. There is  again evidence of urothelial thickening within the upper tracts of both  kidneys as well as the renal pelves bilaterally with mild perinephric  stranding similar to the previous exam suggesting inflammation. There is  persistent mild to moderate dilatation of the upper tracts of both  kidneys also similar to the previous exam. No right-sided ureteral stone  or stent. A Mojica catheter is in place within the bladder which is  evacuated. There is some air within the bladder likely related to  instrumentation/the patient's catheterization.  2. Moderate constipation including fecal impaction within the rectal  vault. There is a periumbilical hernia containing a loop of bowel as  well as diastases of the abdominal musculature over the lower left  anterior pelvis allowing protrusion of abdominal contents. FINDINGS are  stable from the prior exam.  3. No free fluid is present.  4. Gastrostomy tube is in place.  5. Bibasilar atelectasis..           This report was signed and finalized on 11/25/2023 8:32 PM CST by Dr. Yuniel De Jesus MD.       XR Chest 1 View [[id number]] Collected: 11/25/23 1905     Updated: 11/25/23 1909    Narrative:      EXAMINATION: XR CHEST 1 VW-  11/25/2023 7:05 PM CST     HISTORY: Sepsis. Fever.     FINDINGS: Today's exam is compared to previous study of 10/8/2023. A  tracheostomy tube remains in place. Right basilar subsegmental  atelectasis is present. The lungs are otherwise clear. There is no  effusion or free air present.       Impression:      1.. Tracheostomy tube again projects over the midline.  2. Right basilar atelectasis. Lungs are otherwise clear.     This report was signed and finalized on  11/25/2023 7:06 PM CST by Dr. Yuniel De Jesus MD.             LAB RESULTS:      Lab 12/05/23  0655 12/04/23  0600 12/03/23  0729 11/30/23  0920   WBC 8.78 7.06 9.57 7.52   HEMOGLOBIN 10.7* 10.0* 11.8* 10.3*   HEMATOCRIT 33.6* 32.5* 36.5 32.1*   PLATELETS 307 257 260 183   NEUTROS ABS 5.54 3.76 6.72  --    IMMATURE GRANS (ABS) 0.05 0.07* 0.08*  --    LYMPHS ABS 1.96 2.05 1.51  --    MONOS ABS 0.73 0.62 0.85  --    EOS ABS 0.46* 0.51* 0.35  --    MCV 95.7 95.9 94.3 95.3         Lab 12/05/23  0655 12/04/23  0600 12/03/23  0729 11/30/23  0920 11/28/23  1027   SODIUM 137 137 140 139 143   POTASSIUM 4.7 4.2 4.4 4.4 4.3   CHLORIDE 101 101 101 105 110*   CO2 27.0 27.0 27.0 26.0 26.0   ANION GAP 9.0 9.0 12.0 8.0 7.0   BUN 16 19 19 19 17   CREATININE 0.30* 0.29* 0.41* 0.29* 0.28*   EGFR 132.7 133.8 123.1 133.8 134.9   GLUCOSE 111* 118* 182* 137* 131*   CALCIUM 9.8 9.7 10.1 9.2 8.9         Lab 12/05/23  0655 12/03/23  0729   TOTAL PROTEIN 6.8 7.5   ALBUMIN 3.7 3.9   GLOBULIN 3.1 3.6   ALT (SGPT) 15 20   AST (SGOT) 13 16   BILIRUBIN 0.5 0.5   ALK PHOS 72 79                       Brief Urine Lab Results  (Last result in the past 365 days)        Color   Clarity   Blood   Leuk Est   Nitrite   Protein   CREAT   Urine HCG        11/25/23 1820               Negative             Microbiology Results (last 10 days)       Procedure Component Value - Date/Time    Miscellaneous Micro Request - Specimen Not Sent, Specimen Not Sent [[id number]] Resulted: 12/01/23 1103    Lab Status: Final result Specimen: Specimen Not Sent Updated: 12/01/23 1103     Extra Tube PER GROSS B. PHARMD    Narrative:      PER GROSS B. PHARMD  SEE URINE CULTURE # [id number]    Blood Culture - Blood, Hand, Right [[id number]]  (Normal) Collected: 11/25/23 1850    Lab Status: Final result Specimen: Blood from Hand, Right Updated: 11/30/23 1930     Blood Culture No growth at 5 days    COVID PRE-OP / PRE-PROCEDURE SCREENING ORDER (NO ISOLATION) - Swab, Nasopharynx  [[id number]]  (Normal) Collected: 11/25/23 1820    Lab Status: Final result Specimen: Swab from Nasopharynx Updated: 11/25/23 1912    Narrative:      The following orders were created for panel order COVID PRE-OP / PRE-PROCEDURE SCREENING ORDER (NO ISOLATION) - Swab, Nasopharynx.  Procedure                               Abnormality         Status                     ---------                               -----------         ------                     COVID-19, FLU A/B, RSV P...[[id number]]  Normal              Final result                 Please view results for these tests on the individual orders.    COVID-19, FLU A/B, RSV PCR 1 HR TAT - Swab, Nasopharynx [[id number]]  (Normal) Collected: 11/25/23 1820    Lab Status: Final result Specimen: Swab from Nasopharynx Updated: 11/25/23 1912     COVID19 Not Detected     Influenza A PCR Not Detected     Influenza B PCR Not Detected     RSV, PCR Not Detected    Narrative:      Fact sheet for providers: https://www.fda.gov/media/997004/download    Fact sheet for patients: https://www.fda.gov/media/730639/download    Test performed by PCR.    Urine Culture - Urine, Indwelling Urethral Catheter [[id number]]  (Abnormal)  (Susceptibility) Collected: 11/25/23 1819    Lab Status: Edited Result - FINAL Specimen: Urine from Indwelling Urethral Catheter Updated: 12/01/23 1101     Urine Culture >100,000 CFU/mL Escherichia coli ESBL     Comment:   Consider infectious disease consult.  Susceptibility results may not correlate to clinical outcomes.         50,000 CFU/mL Proteus mirabilis      50,000 CFU/mL Pseudomonas aeruginosa    Narrative:      Colonization of the urinary tract without infection is common. Treatment is discouraged unless the patient is symptomatic, pregnant, or undergoing an invasive urologic procedure.  Recent outcomes data supports the use of pip/tazo in the treatment of susceptible ESBL infections for uncomplicated UTI. Consider use of pip/tazo as a carbapenem-sparing  regimen in applicable patients.    Susceptibility        Escherichia coli ESBL      SANDEEP      Amikacin Susceptible      Ertapenem Susceptible      Gentamicin Resistant      Levofloxacin Resistant      Meropenem Susceptible      Nitrofurantoin Susceptible      Piperacillin + Tazobactam Susceptible      Tobramycin Resistant      Trimethoprim + Sulfamethoxazole Resistant                       Susceptibility        Proteus mirabilis      SANDEEP      Ampicillin Susceptible      Ampicillin + Sulbactam Susceptible      Cefazolin Susceptible      Cefepime Susceptible      Ceftazidime Susceptible      Ceftriaxone Susceptible      Gentamicin Susceptible      Levofloxacin Resistant      Meropenem Susceptible (C)  [1]       Nitrofurantoin Resistant      Piperacillin + Tazobactam Susceptible      Trimethoprim + Sulfamethoxazole Susceptible                   [1]  Appended report. These results have been appended to a previously final verified report.               Susceptibility        Pseudomonas aeruginosa      SANDEEP      Cefepime Susceptible      Ceftazidime Susceptible      Ciprofloxacin Intermediate      Gentamicin Susceptible      Levofloxacin Intermediate      Meropenem Susceptible (C)  [1]       Piperacillin + Tazobactam Susceptible                   [1]  Appended report. These results have been appended to a previously final verified report.                   Blood Culture - Blood, Hand, Right [[id number]]  (Abnormal) Collected: 11/25/23 1800    Lab Status: Final result Specimen: Blood from Hand, Right Updated: 11/27/23 0525     Blood Culture Staphylococcus, coagulase negative     Isolated from Aerobic Bottle     Gram Stain Aerobic Bottle Gram positive cocci in clusters    Narrative:      Probable contaminant requires clinical correlation, susceptibility not performed unless requested by physician.      Blood Culture ID, PCR - Blood, Hand, Right [[id number]]  (Abnormal) Collected: 11/25/23 1800    Lab Status: Final result  Specimen: Blood from Hand, Right Updated: 11/26/23 1531     BCID, PCR Staph spp, not aureus or lugdunensis. Identification by BCID2 PCR.     BOTTLE TYPE Aerobic Bottle        HPI .  48-year-old female who is in a chronically debilitated state secondary to a hypoxic injury.  She lives at [facility].  She has a trach, nephrostomy tubes, and a G-tube.  The  notes that the last time her urine bag got cloudy with particle she had a UTI.  He pointed this out to staff.  The patient was subsequently sent to the emergency department because of this.  The patient is making grimacing faces.  She is nonverbal.  The  notes that she does not have any sores on her skin that he is aware of.  The patient is scheduled to have her nephrostomy tubes changed on January 11.     Hospital Course:   HPI .Urine culture took forever to result but positive for ESBL E. Coli, Proteus, and a couple of days ago Pseudomonas also popped up.  She was on Cefepime, then Invanz, and now Merrem.  ID now following.  Plan was for discharge to [facility] on Monday.  Today (Sunday) she developed new sinus tachycardia and appears more uncomfortable on exam but difficult localizing any symptoms.  STAT labs are stable; CXR appears stable.  Getting CT A/P now.      UTI/ESBL and Proteus and Pseudomonas.  Chronic Mojica cath.  Patient finished cefepime, patient finished Invanz.  Patient is now on meropenem antibiotics.  Slow IV hydration lactated ringer.  Leukocytosis resolved.  Consult infectious disease     Good urination since bowel movement last night  Chronic nephrectomy tube.  Plan to have nephrectomy tube change January 11.  Due to bilateral kidney stone chronically..  Nephrogram- left percutaneous nephrostomy catheter is positioned appropriately within the left renal collecting system- proximal portion curled within the left renal pelvis with the distal portion curled in the left aspect of the bladder- proximal portion of the  catheter is patent with contrast filling the proximal left renal collecting system through the sideholes of the proximal catheter- ureteral portion of the catheter appears to be occluded- however contrast does flow antegrade through the patent left ureter (adjacent to the catheter) and into the bladde.  Recommendation patient needs urostomy changed on a regular basis usually just is done at [facility] please schedule for the patient has nephrostomy tube change as soon as able.     Constipation.  Large bowel movement last night.   CT scan abdomen pelvic- nonobstructing bilateral intrarenal stones, the largest  positioned within the dependent portion of the right renal pelvis  measuring 1.2 cm, increasing distention of the proximal right renal collecting system, now moderate in severity- when compared to 11/25/2023- however no right ureteral stones are localized- Left nephroureteral catheter remains in place appropriate in position, Mojica catheter within a decompressed bladder, Large volume of stool throughout the colon consistent with constipation,  Normal appendix,  Chronic laxity of the left lower abdominal/pelvic wall, Gastrostomy tube in place.     Hypotension/tachycardia.  Resolved.  Resolved.     Hypernatremia.  Resolved.     Chronic trach . DuoNebs as needed.  Aspiration precaution.  Trach care.  Chest x-ray-Tracheostomy tube again projects over the midline, Right basilar atelectasis, Lungs are otherwise clear.  Patient is on 8 liter of oxygen     History of chronically debilitating state secondary to hypoxic injury in the past.  Patient is nonverbal.     Constipation/fecal impaction.  Constipation medication order as needed..  MiraLAX and suppository.     Hypertension/hyperlipidemia .  Hold Lopressor due to hypotension.  Crestor.  Echocardiogram 5/22/2022 done at -    The left ventricle is normal size. The left ventricular systolic   function is hyperdynamic, with EF 60 - 80%. Small intra-cavitary gradient  "  present due to cavity obliteration.     Right ventricle size is normal. The right ventricular systolic function   is normal.     There is no recent echo study available for direct [id number]   comparison.      Reflux . Protonix.  Mylicon.  Zofran as needed.     Skin rash.  Nystatin powder . Miconazole powder as needed.  Skin care precaution.     Pain.  Baclofen . Norco as needed.  Dilaudid as needed.     Anxiety/depression.  Atarax.     Patient is from [facility].     Nutrition.  NPO.  G-tube in place.  Multivitamins.  Zinc.  Nutrition consult.     Deconditioning.  Fall precaution     No CPR.   consult     Blood cultures pending.  Urine cultures pending.  COVID-19-negative.  Flu screen negative.  RSV negative     Palliative care consult.     DNR . DNI.     Vital signs stable, afebrile.  Plan is to discharge back to nursing home today.  Tachycardia resolved, hypotension resolved.  Large bowel last night good urine output since the bowel movement. Recommendation patient needs urostomy changed on a regular basis usually just is done at [place] please schedule for the patient has nephrostomy tube change as soon as able.       Physical Exam on Discharge:  /64 (BP Location: Right leg, Patient Position: Lying)   Pulse 96   Temp 98.1 °F (36.7 °C) (Axillary)   Resp 18   Ht 152.4 cm (60\")   Wt 63.9 kg (140 lb 14.4 oz)   LMP  (LMP Unknown)   SpO2 97%   BMI 27.52 kg/m²   Physical Exam  Physical Exam  Vitals and nursing note reviewed.   Constitutional:       Comments: Contracted . chronically ill.  Cachectic.  Patient does not follow commands.   HENT:      Head: Normocephalic.   Eyes:      Conjunctiva/sclera: Conjunctivae normal.      Pupils: Pupils are equal, round, and reactive to light.   Cardiovascular:      Rate and Rhythm: Normal rate and regular rhythm.      Heart sounds: Normal heart sounds.   Pulmonary:      Effort: No respiratory distress.      Comments: Diminished breath " sound bilateral, clear, trach in place, on 8 L .  Abdominal:      General: Bowel sounds are normal. There is no distension.      Palpations: Abdomen is soft.      Tenderness: There is no abdominal tenderness.      Comments: Left nephrotomy tube.  Gastric tube in place.  Mojica cath in place.   Musculoskeletal:         General: No swelling.      Cervical back: Neck supple.      Comments: Severely contracted all extremities.   Skin:     General: Skin is warm and dry.      Capillary Refill: Capillary refill takes 2 to 3 seconds.      Findings: No rash.   Neurological:      Motor: Weakness present.      Coordination: Coordination abnormal.      Gait: Gait abnormal.        Condition on Discharge: Stable.    Discharge Disposition: Discharge back to nursing home. Recommendation patient needs urostomy changed on a regular basis usually just is done at [place] please schedule for the patient has nephrostomy tube change as soon as able.     Skilled Nursing Facility (DC - External)    Discharge Medications:     Discharge Medications        New Medications        Instructions Start Date   hydrOXYzine 25 MG tablet  Commonly known as: ATARAX   25 mg, Per G Tube, Every 6 Hours PRN      ipratropium-albuterol 0.5-2.5 mg/3 ml nebulizer  Commonly known as: DUO-NEB   3 mL, Nebulization, Every 4 Hours PRN      lansoprazole 15 MG Tablet Delayed Release Dispersible disintegrating tablet  Commonly known as: PREVACID SOLUTAB   15 mg, Per G Tube, Every Early Morning      meropenem (MERREM) 1000 mg IVPB in 100ml NS (MBP)   1,000 mg, Intravenous, Every 8 Hours Scheduled      naloxone 4 MG/0.1ML nasal spray  Commonly known as: NARCAN   Call 911. Don't prime. [place] in 1 nostril for overdose. Repeat in 2-3 minutes in other nostril if no or minimal breathing/responsiveness.             Changes to Medications        Instructions Start Date   HYDROcodone-acetaminophen 5-325 MG per tablet  Commonly known as: NORCO  What changed: reasons to take  this   1 tablet, Oral, Every 6 Hours PRN             Continue These Medications        Instructions Start Date   acetaminophen 500 MG tablet  Commonly known as: TYLENOL   500 mg, Per G Tube, Every 4 Hours PRN, Not to exceed 3000mg from all sources daily       albuterol (2.5 MG/3ML) 0.083% nebulizer solution  Commonly known as: PROVENTIL   2.5 mg, Nebulization, Every 4 Hours PRN      baclofen 20 MG tablet  Commonly known as: LIORESAL   20 mg, Per G Tube, Every 6 Hours      bisacodyl 10 MG suppository  Commonly known as: DULCOLAX   10 mg, Rectal, Every 48 Hours PRN      chlorhexidine 0.12 % solution  Commonly known as: PERIDEX   15 mL, Mouth/Throat, 2 Times Daily, 15 ml using oral swab twice daily for oral care      Docusate Sodium 150 MG/15ML syrup   200 mg, Oral, 2 Times Daily      Enoxaparin Sodium 40 MG/0.4ML solution prefilled syringe syringe  Commonly known as: LOVENOX   40 mg, Subcutaneous, Every Morning      ferrous sulfate 220 (44 Fe) MG/5ML liquid liquid   5 mL, Per G Tube, Every Morning      fleet enema 7-19 GM/118ML enema   1 enema, Rectal, Every 48 Hours PRN      liver oil-zinc oxide 40 % ointment  Commonly known as: DESITIN   1 application , Topical, Every 4 Hours PRN      multivitamin with minerals tablet tablet   1 tablet, Oral, Daily      OCUSOFT LID SCRUB EX   1 Application, Both Eyes, Daily      ondansetron 4 MG/5ML solution  Commonly known as: ZOFRAN   4 mg, Oral, Every 6 Hours PRN      polyethylene glycol 17 GM/SCOOP powder  Commonly known as: MIRALAX   17 g, Per G Tube, Every Morning      Refresh Optive 0.5-0.9 % solution  Generic drug: Carboxymethylcellul-Glycerin   1 drop, Ophthalmic, 2 Times Daily, Bilateral eyes      rosuvastatin 5 MG tablet  Commonly known as: CRESTOR   5 mg, Per G Tube, Daily      saccharomyces boulardii 250 MG capsule  Commonly known as: FLORASTOR   250 mg, Per G Tube, Every Morning      simethicone 40 MG/0.6ML drops  Commonly known as: MYLICON   20 mg, Per G Tube, Every 6  Hours      sodium chloride 0.9 % irrigation  Commonly known as: NS   10 mL, Irrigation, Every 8 Hours, Irrigation for secretions: Hydration      sodium chloride 3 % nebulizer solution   4 mL, Nebulization, Every 6 Hours PRN      thiamine 100 MG tablet  tablet  Commonly known as: VITAMIN B-1   100 mg, Per G Tube, Every Morning             Stop These Medications      hydrOXYzine pamoate 25 MG capsule  Commonly known as: VISTARIL     metoprolol tartrate 50 MG tablet  Commonly known as: LOPRESSOR     oxyCODONE 5 MG immediate release tablet  Commonly known as: ROXICODONE     pantoprazole 2 mg/mL suspension suspension  Commonly known as: PROTONIX     potassium chloride 10 MEQ CR capsule  Commonly known as: MICRO-K     RENACIDIN IR     tiZANidine 4 MG tablet  Commonly known as: ZANAFLEX                Discharge Diet:   Diet Instructions       Advance Diet As Tolerated -Target Diet: Gastric tube feeding .      Target Diet: Gastric tube feeding .        Discharge back to nursing via ambulance.    Activity at Discharge: G-tube feeding .    Follow-up Appointments:   Follow-up with nursing home physician soon as able.  Need to schedule by nursing home staff Nephrostomy tube changes at [facility] soon as able.      Electronically signed by Amado Villarreal MD, 12/05/23, 08:27 CST.    Time: Greater than 30 minutes.

## 2023-12-04 NOTE — PROGRESS NOTES
"Infectious Diseases Progress Note    Patient:  Namita Zabala  YOB: 1975  MRN: [id number]   Admit date: 11/25/2023   Admitting Physician: Amado Villarreal MD  Primary Care Physician: David Lara MD    Chief Complaint/Interval History: Other recent laboratory work.  She appears comfortable.  She is not having fever.  She is hemodynamically stable.  Feel she has completed reasonable course of antibiotic treatment.    Intake/Output Summary (Last 24 hours) at 12/4/2023 0829  Last data filed at 12/4/2023 0545  Gross per 24 hour   Intake 2880.04 ml   Output 1230 ml   Net 1650.04 ml     Allergies:   Allergies   Allergen Reactions    Coconut Unknown - High Severity    Levaquin [Levofloxacin] Other (See Comments)     unknown    Nuts Unknown - High Severity    Penicillins     Turkey Other (See Comments)     Causes migraines per pt reports     Current Scheduled Medications:   baclofen, 20 mg, Per G Tube, Q6H  bisacodyl, 10 mg, Rectal, Daily  chlorhexidine, 15 mL, Mouth/Throat, BID  enoxaparin, 40 mg, Subcutaneous, Q24H  hydrOXYzine, 25 mg, Per G Tube, Q6H  lansoprazole, 15 mg, Per G Tube, Q AM  meropenem, 1,000 mg, Intravenous, Q8H  multivitamin with minerals, 1 tablet, Oral, Daily  polyethylene glycol, 17 g, Oral, Daily  rosuvastatin, 5 mg, Per G Tube, Daily  simethicone, 20 mg, Per G Tube, Q6H  sodium chloride, 10 mL, Intravenous, Q12H  sodium chloride, 10 mL, Intravenous, Q12H  sodium chloride, 10 mL, Intravenous, Q12H      Current PRN Medications:    acetaminophen    bisacodyl    HYDROcodone-acetaminophen    ipratropium-albuterol    ondansetron    ondansetron    sodium chloride    sodium chloride    sodium chloride    zinc oxide    Review of Systems    Vital Signs:  BP 97/65 (BP Location: Left arm, Patient Position: Lying)   Pulse 88   Temp 98.5 °F (36.9 °C) (Axillary)   Resp 18   Ht 152.4 cm (60\")   Wt 63.9 kg (140 lb 14.4 oz)   LMP  (LMP Unknown)   SpO2 100%   BMI 27.52 kg/m² "     Physical Exam  Vital signs - reviewed.  Line/IV site - No erythema, warmth, induration, or tenderness.    Lab Results:  CBC:   Results from last 7 days   Lab Units 12/04/23  0600 12/03/23  0729 11/30/23  0920 11/27/23  0953   WBC 10*3/mm3 7.06 9.57 7.52 7.72   HEMOGLOBIN g/dL 10.0* 11.8* 10.3* 10.4*   HEMATOCRIT % 32.5* 36.5 32.1* 32.4*   PLATELETS 10*3/mm3 257 260 183 178     BMP:  Results from last 7 days   Lab Units 12/04/23  0600 12/03/23  0729 11/30/23  0920 11/28/23  1027   SODIUM mmol/L 137 140 139 143   POTASSIUM mmol/L 4.2 4.4 4.4 4.3   CHLORIDE mmol/L 101 101 105 110*   CO2 mmol/L 27.0 27.0 26.0 26.0   BUN mg/dL 19 19 19 17   CREATININE mg/dL 0.29* 0.41* 0.29* 0.28*   GLUCOSE mg/dL 118* 182* 137* 131*   CALCIUM mg/dL 9.7 10.1 9.2 8.9   ALT (SGPT) U/L  --  20  --   --      Percutaneous nephrogram:  IMPRESSION:  1. The left percutaneous nephrostomy catheter is positioned  appropriately within the left renal collecting system. The proximal  portion curled within the left renal pelvis with the distal portion  curled in the left aspect of the bladder. The proximal portion of the  catheter is patent with contrast filling the proximal left renal  collecting system through the sideholes of the proximal catheter. The  ureteral portion of the catheter appears to be occluded, however  contrast does flow antegrade through the patent left ureter (adjacent to  the catheter) and into the bladder.    Impression:   1.  Sepsis on admission-resolved.  She completed antibiotic treatment for urinary tract infection.  2.  Positive blood cultures-contaminant  3.  Ureteral stent-she has a stent from kidney to bladder.  She also has nephrostomy tube.  She does have urine output.  See above percutaneous nephrogram.  4.  Functional quadriplegia/anoxic brain injury    Recommendations:   Okay with me to transfer back to her nursing home.  She typically gets her stents and nephrostomy tubes changed at [facility].  Discussed with  Dr. Villarreal  Feel she is stable for transfer back to her nursing home  Would recommend she undergo her scheduled nephrostomy and stent exchange at [facility]  Would be happy to reassess if recurrent signs or symptoms of infection  She otherwise follow-up with infectious diseases as needed    Vijay Russell MD

## 2023-12-04 NOTE — CASE MANAGEMENT/SOCIAL WORK
Continued Stay Note  [facility]     Patient Name: Namita Zabala  MRN: [id number]  Today's Date: 12/4/2023    Admit Date: 11/25/2023    Plan: [facility]   Discharge Plan       Row Name 12/04/23 1504       Plan    Plan [facility]    Patient/Family in Agreement with Plan yes    Final Discharge Disposition Code 03 - skilled nursing facility (SNF)    Final Note Confirmed with Connie at [facility] that she did receive the referral. They will not have a decision today so she will follow up with spouse when a decision is made. Pt will return to [facility] [phone]. Faxed the face sheet to them at [phone]. Also left a message for pt's spouse, Grant [phone], to inform of d/c. Pt will go by [facility] [phone].                   Discharge Codes    No documentation.                 Expected Discharge Date and Time       Expected Discharge Date Expected Discharge Time    Dec 4, 2023               ORAL Mcintyre

## 2023-12-04 NOTE — PROGRESS NOTES
[facility]  INPATIENT PROGRESS NOTE    Patient Name: Namita Zabala  Date of Admission: 11/25/2023  Today's Date: 12/04/23  Length of Stay: 9  Primary Care Physician: David Lara MD    Subjective   Chief Complaint: Failure to thrive/UTI/sepsis/trach/nephrotomy tube/gastric tube/contracted/anoxic Brain injury   HPI   Blood pressure stable, afebrile.  Patient has not had bowel movement last night.  .  Patient is on 8 L through trach.  Decrease in hemoglobin.  White blood cells normal.    Review of Systems   Unable to obtain patient is nonverbal.  History of an anoxic brain injury.   All pertinent negatives and positives are as above. All other systems have been reviewed and are negative unless otherwise stated.     Objective    Temp:  [98.2 °F (36.8 °C)-99 °F (37.2 °C)] 98.6 °F (37 °C)  Heart Rate:  [] 97  Resp:  [16-18] 16  BP: ()/(56-66) 107/58  Physical Exam  Vitals and nursing note reviewed.   Constitutional:       Comments: Contracted . chronically ill.  Cachectic.  Patient does not follow commands.   HENT:      Head: Normocephalic.   Eyes:      Conjunctiva/sclera: Conjunctivae normal.      Pupils: Pupils are equal, round, and reactive to light.   Cardiovascular:      Rate and Rhythm: Normal rate and regular rhythm.      Heart sounds: Normal heart sounds.   Pulmonary:      Effort: No respiratory distress.      Comments: Diminished breath sound bilateral, clear, trach in place, on 8 L .  Abdominal:      General: Bowel sounds are normal. There is no distension.      Palpations: Abdomen is soft.      Tenderness: There is no abdominal tenderness.      Comments: Left nephrotomy tube.  Gastric tube in place.  Mojica cath in place.   Musculoskeletal:         General: No swelling.      Cervical back: Neck supple.      Comments: Severely contracted all extremities.   Skin:     General: Skin is warm and dry.      Capillary Refill: Capillary  "refill takes 2 to 3 seconds.      Findings: No rash.   Neurological:      Motor: Weakness present.      Coordination: Coordination abnormal.      Gait: Gait abnormal.          Results Review:  I have reviewed the labs, radiology results, and diagnostic studies.    Laboratory Data:   Results from last 7 days   Lab Units 12/04/23  0600 12/03/23  0729 11/30/23  0920   WBC 10*3/mm3 7.06 9.57 7.52   HEMOGLOBIN g/dL 10.0* 11.8* 10.3*   HEMATOCRIT % 32.5* 36.5 32.1*   PLATELETS 10*3/mm3 257 260 183        Results from last 7 days   Lab Units 12/04/23  0600 12/03/23  0729 11/30/23  0920   SODIUM mmol/L 137 140 139   POTASSIUM mmol/L 4.2 4.4 4.4   CHLORIDE mmol/L 101 101 105   CO2 mmol/L 27.0 27.0 26.0   BUN mg/dL 19 19 19   CREATININE mg/dL 0.29* 0.41* 0.29*   CALCIUM mg/dL 9.7 10.1 9.2   BILIRUBIN mg/dL  --  0.5  --    ALK PHOS U/L  --  79  --    ALT (SGPT) U/L  --  20  --    AST (SGOT) U/L  --  16  --    GLUCOSE mg/dL 118* 182* 137*       Culture Data:   No results found for: \"BLOODCX\", \"URINECX\", \"WOUNDCX\", \"MRSACX\", \"RESPCX\", \"STOOLCX\"    Radiology Data:   Imaging Results (Last 24 Hours)       ** No results found for the last 24 hours. **            I have reviewed the patient's current medications.     Assessment/Plan   Assessment  Active Hospital Problems    Diagnosis     **Sepsis     Sepsis due to group B Streptococcus without acute organ dysfunction     Functional quadriplegia     Tracheostomy present     PEG tube malfunction     Severe malnutrition     Acute respiratory failure with hypoxia     Sepsis secondary to UTI     MVP (mitral valve prolapse)     Myofascial pain syndrome        Treatment Plan  HPI .Urine culture took forever to result but positive for ESBL E. Coli, Proteus, and a couple of days ago Pseudomonas also popped up.  She was on Cefepime, then Invanz, and now Merrem.  ID now following.  Plan was for discharge to [facility] on Monday.  Today (Sunday) she developed new sinus tachycardia and appears " more uncomfortable on exam but difficult localizing any symptoms.  STAT labs are stable; CXR appears stable.  Getting CT A/P now.      UTI/ESBL and Proteus and Pseudomonas.  Chronic Mojica cath.  Patient finished cefepime, patient finished Invanz.  Patient is now on meropenem antibiotics.  IV hydration lactated ringer.  Leukocytosis.  Consult infectious disease     Good urination since bowel movement last night  Chronic nephrectomy tube.  Plan to have nephrectomy tube change January 11.  Due to bilateral kidney stone chronically..  Nephrogram- left percutaneous nephrostomy catheter is positioned appropriately within the left renal collecting system- proximal portion curled within the left renal pelvis with the distal portion curled in the left aspect of the bladder- proximal portion of the catheter is patent with contrast filling the proximal left renal collecting system through the sideholes of the proximal catheter- ureteral portion of the catheter appears to be occluded- however contrast does flow antegrade through the patent left ureter (adjacent to the catheter) and into the bladde.  Recommendation patient needs urostomy changed on a regular basis usually just is done at [place] please schedule for the patient has nephrostomy tube change as soon as able.     Constipation.  Large bowel movement last night.   CT scan abdomen pelvic- nonobstructing bilateral intrarenal stones, the largest  positioned within the dependent portion of the right renal pelvis  measuring 1.2 cm, increasing distention of the proximal right renal collecting system, now moderate in severity- when compared to 11/25/2023- however no right ureteral stones are localized- Left nephroureteral catheter remains in place appropriate in position, Mojica catheter within a decompressed bladder, Large volume of stool throughout the colon consistent with constipation,  Normal appendix,  Chronic laxity of the left lower abdominal/pelvic wall,  Gastrostomy tube in place.     Hypotension/tachycardia.  Resolved.  Resolved.     Hypernatremia.  Resolved.     Chronic trach . DuoNebs as needed.  Aspiration precaution.  Trach care.  Chest x-ray-Tracheostomy tube again projects over the midline, Right basilar atelectasis, Lungs are otherwise clear.  Patient is on 8 liter of oxygen     History of chronically debilitating state secondary to hypoxic injury in the past.  Patient is nonverbal.     Constipation/fecal impaction.  Constipation medication order as needed..  MiraLAX and suppository.     Hypertension/hyperlipidemia .  Hold Lopressor due to hypotension.  Crestor.  Echocardiogram 5/22/2022 done at -    The left ventricle is normal size. The left ventricular systolic   function is hyperdynamic, with EF 60 - 80%. Small intra-cavitary gradient   present due to cavity obliteration.     Right ventricle size is normal. The right ventricular systolic function   is normal.     There is no recent echo study available for direct [id number]   comparison.      Reflux . Protonix.  Mylicon.  Zofran as needed.     Skin rash.  Nystatin powder . Miconazole powder as needed.  Skin care precaution.     Pain.  Baclofen . Norco as needed.  Dilaudid as needed.     Anxiety/depression.  Atarax.     Patient is from [facility].     Nutrition.  NPO.  G-tube in place.  Multivitamins.  Zinc.  Nutrition consult.     Deconditioning.  Fall precaution     No CPR.   consult     Blood cultures pending.  Urine cultures pending.  COVID-19-negative.  Flu screen negative.  RSV negative     Palliative care consult.     DNR . DNI.     Medical Decision Making  Number and Complexity of problems: High complexity; patient presented with sepsis likely secondary to urinary tract infection, previous history of ESBL infection, dehydration and intravascular volume depletion with evidence of hypernatremia, in addition to hypotension requiring vasopressors with Levophed for blood  pressure support.  Multiple chronic comorbidities including bedbound state, reported history of anoxic brain injury (details unknown), chronic tracheostomy and PEG tube placement; also with chronic left nephrostomy tube        Conditions and Status        Condition is improving.     [facility] Data  External documents reviewed: none  Cardiac tracing (EKG, telemetry) interpretation: sinus rhythm in the 80s on telemetry monitor this morning  Radiology interpretation: No new radiology studies obtained; I plan to obtain a KUB this morning  Labs reviewed: Labs still pending this morning  Any tests that were considered but not ordered: None at this time     Decision rules/scores evaluated (example [id number], [place], etc): None at this time     Discussed with: Bedside nurse Araceli.  No family at bedside this morning.     Care Planning  Shared decision making: Discussed at length with bedside nurse Araceli.  No family at bedside this morning  Code status and discussions: DO NOT RESUSCITATE and DO NOT INTUBATE     Disposition  Social Determinants of Health that impact treatment or disposition: Patient is from [facility] facility  I expect the patient to be discharged to nursing home facility in 1-3 days    Electronically signed by Amado Villarreal MD, 12/04/23, 16:55 CST.

## 2023-12-04 NOTE — PROGRESS NOTES
Infectious Diseases Progress Note    Patient:  Namita Zabala  YOB: 1975  MRN: [id number]   Admit date: 11/25/2023   Admitting Physician: Arsalan Delarosa MD  Primary Care Physician: David Lara MD    Chief Complaint/Interval History: She is without fever.  She was mildly tachycardic earlier.  Blood pressure stable currently.  She is on 8 L.  Oxygen saturation 99%. She is resting comfortably currently.  Per review of general medical notes, she had appeared more uncomfortable earlier today with elevated heart rate.  She is not verbal.    Intake/Output Summary (Last 24 hours) at 12/3/2023 1812  Last data filed at 12/3/2023 1754  Gross per 24 hour   Intake 4047.82 ml   Output 1555 ml   Net 2492.82 ml     Allergies:   Allergies   Allergen Reactions    Coconut Unknown - High Severity    Levaquin [Levofloxacin] Other (See Comments)     unknown    Nuts Unknown - High Severity    Penicillins     [place] Other (See Comments)     Causes migraines per pt reports     Current Scheduled Medications:   baclofen, 20 mg, Per G Tube, Q6H  bisacodyl, 10 mg, Rectal, Daily  chlorhexidine, 15 mL, Mouth/Throat, BID  enoxaparin, 40 mg, Subcutaneous, Q24H  hydrOXYzine, 25 mg, Per G Tube, Q6H  lansoprazole, 15 mg, Per G Tube, Q AM  meropenem, 1,000 mg, Intravenous, Q8H  multivitamin with minerals, 1 tablet, Oral, Daily  polyethylene glycol, 17 g, Oral, Daily  rosuvastatin, 5 mg, Per G Tube, Daily  simethicone, 20 mg, Per G Tube, Q6H  sodium chloride, 10 mL, Intravenous, Q12H  sodium chloride, 10 mL, Intravenous, Q12H  sodium chloride, 10 mL, Intravenous, Q12H      Current PRN Medications:    acetaminophen    bisacodyl    HYDROcodone-acetaminophen    ipratropium-albuterol    ondansetron    ondansetron    sodium chloride    sodium chloride    sodium chloride    zinc oxide    Review of Systems unobtainable from patient    Vital Signs:  /74 (BP Location: Right leg, Patient Position: Lying)   Pulse 95    "Temp 99.1 °F (37.3 °C) (Axillary)   Resp 18   Ht 152.4 cm (60\")   Wt 64 kg (141 lb)   LMP  (LMP Unknown)   SpO2 99%   BMI 27.54 kg/m²     Physical Exam  Vital signs - reviewed.  Line/IV site - No erythema, warmth, induration, or tenderness.  Abdomen mild distention, but bowel sounds present seems soft.  Left thigh PICC in place    Lab Results:  CBC:   Results from last 7 days   Lab Units 12/03/23  0729 11/30/23  0920 11/27/23  0953   WBC 10*3/mm3 9.57 7.52 7.72   HEMOGLOBIN g/dL 11.8* 10.3* 10.4*   HEMATOCRIT % 36.5 32.1* 32.4*   PLATELETS 10*3/mm3 260 183 178     BMP:  Results from last 7 days   Lab Units 12/03/23  0729 11/30/23  0920 11/28/23  1027 11/27/23  0748   SODIUM mmol/L 140 139 143 146*   POTASSIUM mmol/L 4.4 4.4 4.3 3.9   CHLORIDE mmol/L 101 105 110* 112*   CO2 mmol/L 27.0 26.0 26.0 23.0   BUN mg/dL 19 19 17 26*   CREATININE mg/dL 0.41* 0.29* 0.28* 0.31*   GLUCOSE mg/dL 182* 137* 131* 125*   CALCIUM mg/dL 10.1 9.2 8.9 9.0   ALT (SGPT) U/L 20  --   --  14     Culture Results:   Blood Culture   Date Value Ref Range Status   11/25/2023 No growth at 5 days   Final   11/25/2023 Staphylococcus, coagulase negative (C)   Final              Urine Culture   Date Value Ref Range Status   11/25/2023 >100,000 CFU/mL Escherichia coli ESBL (A)   Final       Comment:          Consider infectious disease consult.  Susceptibility results may not correlate to clinical outcomes.   11/25/2023 50,000 CFU/mL Proteus mirabilis (A)   Final   11/25/2023 50,000 CFU/mL Pseudomonas aeruginosa (A)   Final   Susceptibility               Escherichia coli ESBL Proteus mirabilis Pseudomonas aeruginosa       SANDEEP SANDEEP SANDEEP       Amikacin 4 ug/ml Susceptible               Ampicillin     <=2 ug/ml Susceptible           Ampicillin + Sulbactam     <=2 ug/ml Susceptible           Cefazolin     <=4 ug/ml Susceptible           Cefepime     <=1 ug/ml Susceptible 4 ug/ml Susceptible       Ceftazidime     <=1 ug/ml Susceptible <=1 ug/ml " Susceptible       Ceftriaxone     <=1 ug/ml Susceptible           Ciprofloxacin         1 ug/ml Intermediate       Ertapenem <=0.5 ug/ml Susceptible               Gentamicin >=16 ug/ml Resistant <=1 ug/ml Susceptible <=1 ug/ml Susceptible       Levofloxacin >=8 ug/ml Resistant 4 ug/ml Resistant 2 ug/ml Intermediate       Meropenem <=0.25 ug/ml Susceptible <=0.25 ug/ml Susceptible (C) 1 <=0.25 ug/ml Susceptible (C) 1       Nitrofurantoin <=16 ug/ml Susceptible 128 ug/ml Resistant           Piperacillin + Tazobactam <=4 ug/ml Susceptible <=4 ug/ml Susceptible 8 ug/ml Susceptible       Tobramycin >=16 ug/ml Resistant               Trimethoprim + Sulfamethoxazole >=320 ug/ml Resistant <=20 ug/ml Susceptible          Radiology:   CT abdomen and pelvis done today:  IMPRESSION:  1. There are nonobstructing bilateral intrarenal stones, the largest  positioned within the dependent portion of the right renal pelvis  measuring 1.2 cm. There is increasing distention of the proximal right  renal collecting system, now moderate in severity, when compared to  11/25/2023, however no right ureteral stones are localized. Left  nephroureteral catheter remains in place, appropriate in position. Mojica  catheter within a decompressed bladder.      2. Large volume of stool throughout the colon consistent with  constipation. Normal appendix. Chronic laxity of the left lower  abdominal/pelvic wall. Gastrostomy tube in place.    Additional Studies Reviewed: None    Impression:   1.  Sepsis syndrome on admission-improved.  No further fever.  Completing antibiotic treatment.  2.  Positive blood cultures-contaminant  3.  Ureteral stenting-she has for stenting has her stents and percutaneous nephrostomy tubes changed at [place].  4.  Functional quadriplegia anoxic brain injury    Recommendations:   Plan 1 discussed with Dr. Delarosa yesterday was for antibiotic treatment to be continued through the morning dose on December 4, 2023,  discontinue antibiotic treatment, let her discharged back to her nursing home facility, and have arrangements made for her stent and nephrostomy tube exchange at [place].  For appears to be uncomfortable or demonstrate decline in renal function, given above CT findings may need reassessment of nephrostomy/stent function and attempts made for earlier rather than later exchange if possible.  Repeating BMP and CBC in cirilo.    Vijay Russell MD

## 2023-12-05 VITALS
HEIGHT: 60 IN | OXYGEN SATURATION: 100 % | WEIGHT: 140.9 LBS | BODY MASS INDEX: 27.66 KG/M2 | SYSTOLIC BLOOD PRESSURE: 109 MMHG | HEART RATE: 89 BPM | RESPIRATION RATE: 18 BRPM | TEMPERATURE: 98.1 F | DIASTOLIC BLOOD PRESSURE: 64 MMHG

## 2023-12-05 LAB
ALBUMIN SERPL-MCNC: 3.7 G/DL (ref 3.5–5.2)
ALBUMIN/GLOB SERPL: 1.2 G/DL
ALP SERPL-CCNC: 72 U/L (ref 39–117)
ALT SERPL W P-5'-P-CCNC: 15 U/L (ref 1–33)
ANION GAP SERPL CALCULATED.3IONS-SCNC: 9 MMOL/L (ref 5–15)
AST SERPL-CCNC: 13 U/L (ref 1–32)
BASOPHILS # BLD AUTO: 0.04 10*3/MM3 (ref 0–0.2)
BASOPHILS NFR BLD AUTO: 0.5 % (ref 0–1.5)
BILIRUB SERPL-MCNC: 0.5 MG/DL (ref 0–1.2)
BUN SERPL-MCNC: 16 MG/DL (ref 6–20)
BUN/CREAT SERPL: 53.3 (ref 7–25)
CALCIUM SPEC-SCNC: 9.8 MG/DL (ref 8.6–10.5)
CHLORIDE SERPL-SCNC: 101 MMOL/L (ref 98–107)
CO2 SERPL-SCNC: 27 MMOL/L (ref 22–29)
CREAT SERPL-MCNC: 0.3 MG/DL (ref 0.57–1)
DEPRECATED RDW RBC AUTO: 45.9 FL (ref 37–54)
EGFRCR SERPLBLD CKD-EPI 2021: 132.7 ML/MIN/1.73
EOSINOPHIL # BLD AUTO: 0.46 10*3/MM3 (ref 0–0.4)
EOSINOPHIL NFR BLD AUTO: 5.2 % (ref 0.3–6.2)
ERYTHROCYTE [DISTWIDTH] IN BLOOD BY AUTOMATED COUNT: 13.3 % (ref 12.3–15.4)
GLOBULIN UR ELPH-MCNC: 3.1 GM/DL
GLUCOSE BLDC GLUCOMTR-MCNC: 111 MG/DL (ref 70–130)
GLUCOSE SERPL-MCNC: 111 MG/DL (ref 65–99)
HCT VFR BLD AUTO: 33.6 % (ref 34–46.6)
HGB BLD-MCNC: 10.7 G/DL (ref 12–15.9)
IMM GRANULOCYTES # BLD AUTO: 0.05 10*3/MM3 (ref 0–0.05)
IMM GRANULOCYTES NFR BLD AUTO: 0.6 % (ref 0–0.5)
LYMPHOCYTES # BLD AUTO: 1.96 10*3/MM3 (ref 0.7–3.1)
LYMPHOCYTES NFR BLD AUTO: 22.3 % (ref 19.6–45.3)
MCH RBC QN AUTO: 30.5 PG (ref 26.6–33)
MCHC RBC AUTO-ENTMCNC: 31.8 G/DL (ref 31.5–35.7)
MCV RBC AUTO: 95.7 FL (ref 79–97)
MONOCYTES # BLD AUTO: 0.73 10*3/MM3 (ref 0.1–0.9)
MONOCYTES NFR BLD AUTO: 8.3 % (ref 5–12)
NEUTROPHILS NFR BLD AUTO: 5.54 10*3/MM3 (ref 1.7–7)
NEUTROPHILS NFR BLD AUTO: 63.1 % (ref 42.7–76)
NRBC BLD AUTO-RTO: 0 /100 WBC (ref 0–0.2)
PLATELET # BLD AUTO: 307 10*3/MM3 (ref 140–450)
PMV BLD AUTO: 10.7 FL (ref 6–12)
POTASSIUM SERPL-SCNC: 4.7 MMOL/L (ref 3.5–5.2)
PROT SERPL-MCNC: 6.8 G/DL (ref 6–8.5)
RBC # BLD AUTO: 3.51 10*6/MM3 (ref 3.77–5.28)
SODIUM SERPL-SCNC: 137 MMOL/L (ref 136–145)
WBC NRBC COR # BLD AUTO: 8.78 10*3/MM3 (ref 3.4–10.8)

## 2023-12-05 PROCEDURE — 82948 REAGENT STRIP/BLOOD GLUCOSE: CPT

## 2023-12-05 PROCEDURE — 25010000002 MEROPENEM PER 100 MG: Performed by: INTERNAL MEDICINE

## 2023-12-05 PROCEDURE — 94799 UNLISTED PULMONARY SVC/PX: CPT

## 2023-12-05 PROCEDURE — 80053 COMPREHEN METABOLIC PANEL: CPT | Performed by: FAMILY MEDICINE

## 2023-12-05 PROCEDURE — 85025 COMPLETE CBC W/AUTO DIFF WBC: CPT | Performed by: FAMILY MEDICINE

## 2023-12-05 RX ADMIN — Medication 20 MG: at 05:46

## 2023-12-05 RX ADMIN — HYDROXYZINE HYDROCHLORIDE 25 MG: 25 TABLET ORAL at 05:46

## 2023-12-05 RX ADMIN — Medication 10 ML: at 09:11

## 2023-12-05 RX ADMIN — CHLORHEXIDINE GLUCONATE 15 ML: 1.2 SOLUTION ORAL at 09:11

## 2023-12-05 RX ADMIN — BACLOFEN 20 MG: 10 TABLET ORAL at 13:13

## 2023-12-05 RX ADMIN — HYDROCODONE BITARTRATE AND ACETAMINOPHEN 1 TABLET: 5; 325 TABLET ORAL at 05:45

## 2023-12-05 RX ADMIN — POLYETHYLENE GLYCOL 3350 17 G: 17 POWDER, FOR SOLUTION ORAL at 09:11

## 2023-12-05 RX ADMIN — LANSOPRAZOLE 15 MG: 15 TABLET, ORALLY DISINTEGRATING ORAL at 05:46

## 2023-12-05 RX ADMIN — Medication 1 TABLET: at 09:10

## 2023-12-05 RX ADMIN — BACLOFEN 20 MG: 10 TABLET ORAL at 05:46

## 2023-12-05 RX ADMIN — Medication 20 MG: at 13:15

## 2023-12-05 RX ADMIN — HYDROXYZINE HYDROCHLORIDE 25 MG: 25 TABLET ORAL at 13:13

## 2023-12-05 RX ADMIN — ROSUVASTATIN CALCIUM 5 MG: 5 TABLET, FILM COATED ORAL at 09:10

## 2023-12-05 RX ADMIN — MEROPENEM 1000 MG: 1 INJECTION, POWDER, FOR SOLUTION INTRAVENOUS at 05:45

## 2023-12-05 NOTE — CASE MANAGEMENT/SOCIAL WORK
Continued Stay Note  [facility]     Patient Name: Namita Zabala  MRN: [id number]  Today's Date: 12/5/2023    Admit Date: 11/25/2023    Plan: [facility]   Discharge Plan       Row Name 12/05/23 1041       Plan    Plan [facility]    Patient/Family in Agreement with Plan yes    Final Discharge Disposition Code 03 - skilled nursing facility (SNF)    Final Note Pt did not d/c yesterday as planned. She will return to [facility] [phone]. They are able to do the IV Merrem. Pt will go by [facility] [phone].                   Discharge Codes    No documentation.                 Expected Discharge Date and Time       Expected Discharge Date Expected Discharge Time    Dec 5, 2023               ORAL Mcintyre

## 2023-12-05 NOTE — PLAN OF CARE
Goal Outcome Evaluation:  Plan of Care Reviewed With: patient        Progress: no change  Outcome Evaluation: IVF; ABX; Turn Q2; Tube feeds; Trach care; Mojica; left neph tube; medicated for pain x1; resting between care; safety maintained

## 2023-12-05 NOTE — PROGRESS NOTES
"Enter Query Response Below      Query Response: UTI due to stratton catheter              If applicable, please update the problem list.       Patient: Namita Zabala        : 1975  Account: [id number]           Admit Date:         How to Respond to this query:       a. Click New Note     b. Answer query within the yellow box.                c. Update the Problem List, if applicable.      If you have any questions about this query contact me at: Sincerely,    Pedro Acharya RN, BSN  Clinical Documentation Integrity  [facility]  [email or web address]       ,     48 year old female with history of anoxic brain injury presented with findings of sepsis due to UTI, along with presence of indwelling stratton catheter, left nephrostomy tube and ureteral stent.    -H/P reports \"Infection associated with nephrostomy catheter\" with diagnosis not carried through in the record.    -Stratton catheter was changed . Urologist noted \"Urine culture from 2 days ago growing gram-negative rods appears that this was  from her old indwelling catheter,\" with left ureteral stent in appropriate placement per CT  findings, recommendation to change as scheduled at another facility    After study, please further  clarify the etiology of the UTI:    UTI due to stratton catheter  UTI  due to chronic nephrostomy tube with ureteral stent   UTI due to both stratton catheter and chronic nephrostomy tube with ureteral stent  UTI not related to stratton catheter, chronic nephrostomy tube or ureteral stent  Other- specify_______  Unable to determine      By submitting this query, we are merely seeking further clarification of documentation to accurately reflect all conditions that you are monitoring, evaluating, treating or that extend the hospitalization or utilize additional resources of care. Please utilize your independent clinical judgment when addressing the question(s) above.     This query and your response, once completed, " will be entered into the legal medical record.    Sincerely,  Pedro Acharya  Clinical Documentation Integrity Program

## 2023-12-05 NOTE — PLAN OF CARE
Goal Outcome Evaluation:  Plan of Care Reviewed With: patient, spouse        Progress: no change  Outcome Evaluation: Pt stable this shift; tube feeding continue; IVF and IV abx continue; d/c planned for tomorrow back to SNF with IV abx; femoral PICC in place to left leg, dsg c/d/i; turned q2hr; changed as needed; safety maintained.

## 2023-12-06 ENCOUNTER — TELEPHONE (OUTPATIENT)
Dept: PRIMARY CARE CLINIC | Age: 48
End: 2023-12-06

## 2023-12-06 NOTE — TELEPHONE ENCOUNTER
Dr. Donato Ramirez, she was released from [facility] to go back to her LTC facility. Would you like me to change them to her PCP? Poor prognosis. Let me know.  Ewelina KNIGHT

## 2023-12-07 ENCOUNTER — HOSPITAL ENCOUNTER (EMERGENCY)
Facility: HOSPITAL | Age: 48
Discharge: HOME | End: 2023-12-07
Payer: MEDICARE

## 2023-12-07 VITALS
RESPIRATION RATE: 18 BRPM | BODY MASS INDEX: 27.48 KG/M2 | WEIGHT: 140 LBS | HEIGHT: 60 IN | OXYGEN SATURATION: 100 % | DIASTOLIC BLOOD PRESSURE: 59 MMHG | SYSTOLIC BLOOD PRESSURE: 90 MMHG | HEART RATE: 91 BPM | TEMPERATURE: 98.9 F

## 2023-12-07 DIAGNOSIS — Z86.69 HISTORY OF ANOXIC BRAIN INJURY: ICD-10-CM

## 2023-12-07 DIAGNOSIS — Z43.0 ENCOUNTER FOR TRACHEOSTOMY TUBE CHANGE: Primary | ICD-10-CM

## 2023-12-07 PROCEDURE — 99283 EMERGENCY DEPT VISIT LOW MDM: CPT

## 2023-12-07 NOTE — ED PROVIDER NOTES
Subjective   History of Present Illness  Patient is a 48-year-old female who presents emergency department via EMS from [facility].  Patient has history of an anoxic brain injury.  She is nonverbal.  She has a chronic tracheostomy, nephrostomy tubes and G-tube.  According to EMS, the [facility] home staff was trying to change her trach out and they were supposed to put a 6 in, however they were only able to put a 4 in. Patient chronically wears 6 L of oxygen through her trach.  Her oxygen saturation is currently 100%.  Patient was actually just discharged on 12/5/2023.  She was admitted to the hospital for sepsis.  Patient was discharged to [facility] where she is receiving IV Merrem.        Review of Systems   Unable to perform ROS: Patient nonverbal       Past Medical History:   Diagnosis Date    Acute kidney failure, unspecified     Angina at rest     Anoxic brain damage, not elsewhere classified     Chronic pulmonary embolism     Chronic respiratory failure, unspecified whether with hypoxia or hypercapnia     Dependence on respirator (ventilator) status     Gastrointestinal hemorrhage, unspecified     Hypercalcemia     Iron deficiency anemia     Metabolic encephalopathy     Migraine     Sees Pain Management for injections.     MVP (mitral valve prolapse)     Other dysphagia     Other pulmonary embolism with acute cor pulmonale     Renal disorder     Ruptured bladder     Syncope     Urinary tract infection, site not specified        Allergies   Allergen Reactions    Coconut Unknown - High Severity    Levaquin [Levofloxacin] Other (See Comments)     unknown    Nuts Unknown - High Severity    Penicillins     [place] Other (See Comments)     Causes migraines per pt reports       Past Surgical History:   Procedure Laterality Date    ABDOMINAL SURGERY      BREAST BIOPSY Right 2011    benign    GTUBE INSERTION      INSERTION HEMODIALYSIS CATHETER Left 09/16/2021    Procedure: HEMODIALYSIS CATHETER INSERTION;   Surgeon: Anders Kaur MD;  Location: [facility] OR 12;  Service: Vascular;  Laterality: Left;    TONSILLECTOMY      TRACHEOSTOMY         Family History   Problem Relation Age of Onset    Colon cancer Maternal Aunt 52    Fibromyalgia Mother     Heart disease Mother     Hypertension Mother     Hodgkin's lymphoma Paternal Grandmother     Ovarian cancer Neg Hx     Breast cancer Neg Hx        Social History     Socioeconomic History    Marital status:    Tobacco Use    Smoking status: Never    Smokeless tobacco: Never   Vaping Use    Vaping Use: Never used   Substance and Sexual Activity    Alcohol use: No    Drug use: No           Objective   Physical Exam  Vitals and nursing note reviewed.   Constitutional:       General: She is not in acute distress.     Appearance: She is ill-appearing. She is not toxic-appearing.      Comments: Chronically ill-appearing.   HENT:      Head: Normocephalic.   Neck:      Comments: Tracheostomy present.  On 6 L of oxygen.  Cardiovascular:      Rate and Rhythm: Normal rate and regular rhythm.      Pulses: Normal pulses.      Heart sounds: Normal heart sounds.   Pulmonary:      Effort: Pulmonary effort is normal.      Breath sounds: Normal breath sounds.   Abdominal:      General: Abdomen is flat. Bowel sounds are normal.      Palpations: Abdomen is soft.      Comments: Nephrostomy tube and G-tube in place.   Musculoskeletal:      Cervical back: Normal range of motion.      Comments: Patient severely contracted in upper and lower extremities.   Skin:     General: Skin is warm and dry.   Neurological:      Mental Status: Mental status is at baseline.      Comments: Patient with history of an anoxic brain injury.  Patient is nonverbal.  She is unable to follow any commands.         Procedures           ED Course                                             Medical Decision Making  Patient is a 48-year-old female who presents emergency department via EMS from [facility]  nursing home.  Patient has history of an anoxic brain injury.  She is nonverbal.  She has a chronic tracheostomy, nephrostomy tubes and G-tube.  According to EMS, the nursing home staff was trying to change her trach out and they were supposed to put a 6 in, however they were only able to put a 4 in. Patient chronically wears 6 L of oxygen through her trach.  Her oxygen saturation is currently 100%.  Patient was actually just discharged on 12/5/2023.  She was admitted to the hospital for sepsis.  Patient was discharged to [facility] where she is receiving IV Merrem.    Vital signs stable on arrival.    Patient's presentation raises suspicion for differentials including, but not limited to, tracheostomy complication, hypoxia.     External (non-ED) record review: Discharge summary from 12/5/2023 reviewed.    Respiratory changed tracheostomy tube in the ER.  Patient now has a size 6 Shiley, which is her normal size.  This was replaced without complication.  There was no bleeding after changing.  Tracheostomy was also suctioned.  On re-evaluation, patient remained hemodynamically stable and appeared to be comfortable and in no acute distress.  We will plan to discharge patient back to nursing facility.  Patient discharged in stable condition.    Signed by:   BERT Martino 12/7/2023 17:34 CST     Dragon disclaimer:  Part of this note may be an electronic transcription/translation of spoken language to printed text using the Dragon Dictation System.    Problems Addressed:  Encounter for tracheostomy tube change: acute illness or injury  History of anoxic brain injury: acute illness or injury        Final diagnoses:   Encounter for tracheostomy tube change   History of anoxic brain injury       ED Disposition  ED Disposition       ED Disposition   Discharge    Condition   Stable    Comment   --               David Lara MD  [address] [place] [address] DR   [facility] [address]  [phone]    Schedule an  appointment as soon as possible for a visit in 1 day      [facility] EMERGENCY DEPARTMENT  [address] [place] [id number] [phone]  Go to   If symptoms worsen         Medication List        Stop      meropenem (MERREM) 1000 mg IVPB in 100ml NS (MBP)                 Betsey Jauregui, BERT  12/07/23 7065

## 2023-12-08 NOTE — ED NOTES
Called [facility] at this time for update, pt is still on board fro transport unknown arrival time

## 2023-12-28 ENCOUNTER — APPOINTMENT (OUTPATIENT)
Dept: GENERAL RADIOLOGY | Facility: HOSPITAL | Age: 48
End: 2023-12-28
Payer: MEDICARE

## 2023-12-28 ENCOUNTER — APPOINTMENT (OUTPATIENT)
Dept: CT IMAGING | Facility: HOSPITAL | Age: 48
End: 2023-12-28
Payer: MEDICARE

## 2023-12-28 ENCOUNTER — HOSPITAL ENCOUNTER (EMERGENCY)
Facility: HOSPITAL | Age: 48
Discharge: OTHER INSTITUTION | End: 2023-12-29
Attending: STUDENT IN AN ORGANIZED HEALTH CARE EDUCATION/TRAINING PROGRAM
Payer: MEDICARE

## 2023-12-28 DIAGNOSIS — A41.9 SEPSIS, DUE TO UNSPECIFIED ORGANISM, UNSPECIFIED WHETHER ACUTE ORGAN DYSFUNCTION PRESENT: Primary | ICD-10-CM

## 2023-12-28 LAB
ALBUMIN SERPL-MCNC: 3.7 G/DL (ref 3.5–5.2)
ALBUMIN/GLOB SERPL: 1.2 G/DL
ALP SERPL-CCNC: 58 U/L (ref 39–117)
ALT SERPL W P-5'-P-CCNC: 18 U/L (ref 1–33)
ANION GAP SERPL CALCULATED.3IONS-SCNC: 12 MMOL/L (ref 5–15)
APTT PPP: 27.6 SECONDS (ref 24.5–36)
AST SERPL-CCNC: 11 U/L (ref 1–32)
B-HCG UR QL: NEGATIVE
BACTERIA UR QL AUTO: ABNORMAL /HPF
BASOPHILS # BLD AUTO: 0.07 10*3/MM3 (ref 0–0.2)
BASOPHILS NFR BLD AUTO: 0.6 % (ref 0–1.5)
BILIRUB SERPL-MCNC: 0.7 MG/DL (ref 0–1.2)
BILIRUB UR QL STRIP: NEGATIVE
BUN SERPL-MCNC: 36 MG/DL (ref 6–20)
BUN/CREAT SERPL: 63.2 (ref 7–25)
CALCIUM SPEC-SCNC: 9.6 MG/DL (ref 8.6–10.5)
CHLORIDE SERPL-SCNC: 116 MMOL/L (ref 98–107)
CLARITY UR: ABNORMAL
CO2 SERPL-SCNC: 26 MMOL/L (ref 22–29)
COLOR UR: ABNORMAL
CREAT SERPL-MCNC: 0.57 MG/DL (ref 0.57–1)
D-LACTATE SERPL-SCNC: 1.8 MMOL/L (ref 0.5–2)
DEPRECATED RDW RBC AUTO: 44.6 FL (ref 37–54)
EGFRCR SERPLBLD CKD-EPI 2021: 113.7 ML/MIN/1.73
EOSINOPHIL # BLD AUTO: 0.05 10*3/MM3 (ref 0–0.4)
EOSINOPHIL NFR BLD AUTO: 0.4 % (ref 0.3–6.2)
ERYTHROCYTE [DISTWIDTH] IN BLOOD BY AUTOMATED COUNT: 13 % (ref 12.3–15.4)
EXPIRATION DATE: NORMAL
FLUAV RNA RESP QL NAA+PROBE: NOT DETECTED
FLUBV RNA RESP QL NAA+PROBE: NOT DETECTED
GLOBULIN UR ELPH-MCNC: 3.2 GM/DL
GLUCOSE SERPL-MCNC: 128 MG/DL (ref 65–99)
GLUCOSE UR STRIP-MCNC: NEGATIVE MG/DL
HCT VFR BLD AUTO: 41.9 % (ref 34–46.6)
HGB BLD-MCNC: 13.3 G/DL (ref 12–15.9)
HGB UR QL STRIP.AUTO: ABNORMAL
HYALINE CASTS UR QL AUTO: ABNORMAL /LPF
IMM GRANULOCYTES # BLD AUTO: 0.03 10*3/MM3 (ref 0–0.05)
IMM GRANULOCYTES NFR BLD AUTO: 0.2 % (ref 0–0.5)
INR PPP: 1.05 (ref 0.91–1.09)
INTERNAL NEGATIVE CONTROL: NEGATIVE
INTERNAL POSITIVE CONTROL: POSITIVE
KETONES UR QL STRIP: NEGATIVE
LEUKOCYTE ESTERASE UR QL STRIP.AUTO: ABNORMAL
LYMPHOCYTES # BLD AUTO: 2.22 10*3/MM3 (ref 0.7–3.1)
LYMPHOCYTES NFR BLD AUTO: 18.1 % (ref 19.6–45.3)
Lab: NORMAL
MCH RBC QN AUTO: 30.3 PG (ref 26.6–33)
MCHC RBC AUTO-ENTMCNC: 31.7 G/DL (ref 31.5–35.7)
MCV RBC AUTO: 95.4 FL (ref 79–97)
MONOCYTES # BLD AUTO: 0.99 10*3/MM3 (ref 0.1–0.9)
MONOCYTES NFR BLD AUTO: 8.1 % (ref 5–12)
NEUTROPHILS NFR BLD AUTO: 72.6 % (ref 42.7–76)
NEUTROPHILS NFR BLD AUTO: 8.88 10*3/MM3 (ref 1.7–7)
NITRITE UR QL STRIP: POSITIVE
NRBC BLD AUTO-RTO: 0 /100 WBC (ref 0–0.2)
PH UR STRIP.AUTO: >=9 [PH] (ref 5–8)
PLATELET # BLD AUTO: 169 10*3/MM3 (ref 140–450)
PMV BLD AUTO: 12.7 FL (ref 6–12)
POTASSIUM SERPL-SCNC: 4.4 MMOL/L (ref 3.5–5.2)
PROCALCITONIN SERPL-MCNC: 0.23 NG/ML (ref 0–0.25)
PROT SERPL-MCNC: 6.9 G/DL (ref 6–8.5)
PROT UR QL STRIP: ABNORMAL
PROTHROMBIN TIME: 13.8 SECONDS (ref 11.8–14.8)
RBC # BLD AUTO: 4.39 10*6/MM3 (ref 3.77–5.28)
RBC # UR STRIP: ABNORMAL /HPF
REF LAB TEST METHOD: ABNORMAL
RSV RNA NPH QL NAA+NON-PROBE: NOT DETECTED
SARS-COV-2 RNA RESP QL NAA+PROBE: NOT DETECTED
SODIUM SERPL-SCNC: 154 MMOL/L (ref 136–145)
SP GR UR STRIP: 1.01 (ref 1–1.03)
SQUAMOUS #/AREA URNS HPF: ABNORMAL /HPF
UROBILINOGEN UR QL STRIP: ABNORMAL
WBC # UR STRIP: ABNORMAL /HPF
WBC NRBC COR # BLD AUTO: 12.24 10*3/MM3 (ref 3.4–10.8)

## 2023-12-28 PROCEDURE — 81001 URINALYSIS AUTO W/SCOPE: CPT | Performed by: STUDENT IN AN ORGANIZED HEALTH CARE EDUCATION/TRAINING PROGRAM

## 2023-12-28 PROCEDURE — 87186 SC STD MICRODIL/AGAR DIL: CPT | Performed by: STUDENT IN AN ORGANIZED HEALTH CARE EDUCATION/TRAINING PROGRAM

## 2023-12-28 PROCEDURE — 74177 CT ABD & PELVIS W/CONTRAST: CPT

## 2023-12-28 PROCEDURE — 25510000001 IOPAMIDOL 61 % SOLUTION: Performed by: STUDENT IN AN ORGANIZED HEALTH CARE EDUCATION/TRAINING PROGRAM

## 2023-12-28 PROCEDURE — 71045 X-RAY EXAM CHEST 1 VIEW: CPT

## 2023-12-28 PROCEDURE — 87077 CULTURE AEROBIC IDENTIFY: CPT | Performed by: STUDENT IN AN ORGANIZED HEALTH CARE EDUCATION/TRAINING PROGRAM

## 2023-12-28 PROCEDURE — 85025 COMPLETE CBC W/AUTO DIFF WBC: CPT | Performed by: STUDENT IN AN ORGANIZED HEALTH CARE EDUCATION/TRAINING PROGRAM

## 2023-12-28 PROCEDURE — 87637 SARSCOV2&INF A&B&RSV AMP PRB: CPT | Performed by: STUDENT IN AN ORGANIZED HEALTH CARE EDUCATION/TRAINING PROGRAM

## 2023-12-28 PROCEDURE — 25010000002 CEFEPIME PER 500 MG: Performed by: STUDENT IN AN ORGANIZED HEALTH CARE EDUCATION/TRAINING PROGRAM

## 2023-12-28 PROCEDURE — 36415 COLL VENOUS BLD VENIPUNCTURE: CPT

## 2023-12-28 PROCEDURE — 99285 EMERGENCY DEPT VISIT HI MDM: CPT

## 2023-12-28 PROCEDURE — 87086 URINE CULTURE/COLONY COUNT: CPT | Performed by: STUDENT IN AN ORGANIZED HEALTH CARE EDUCATION/TRAINING PROGRAM

## 2023-12-28 PROCEDURE — 0 HYDROMORPHONE 1 MG/ML SOLUTION: Performed by: STUDENT IN AN ORGANIZED HEALTH CARE EDUCATION/TRAINING PROGRAM

## 2023-12-28 PROCEDURE — 80053 COMPREHEN METABOLIC PANEL: CPT | Performed by: STUDENT IN AN ORGANIZED HEALTH CARE EDUCATION/TRAINING PROGRAM

## 2023-12-28 PROCEDURE — 81025 URINE PREGNANCY TEST: CPT | Performed by: STUDENT IN AN ORGANIZED HEALTH CARE EDUCATION/TRAINING PROGRAM

## 2023-12-28 PROCEDURE — 96375 TX/PRO/DX INJ NEW DRUG ADDON: CPT

## 2023-12-28 PROCEDURE — 25010000002 VANCOMYCIN 10 G RECONSTITUTED SOLUTION: Performed by: STUDENT IN AN ORGANIZED HEALTH CARE EDUCATION/TRAINING PROGRAM

## 2023-12-28 PROCEDURE — 25810000003 SODIUM CHLORIDE 0.9 % SOLUTION: Performed by: STUDENT IN AN ORGANIZED HEALTH CARE EDUCATION/TRAINING PROGRAM

## 2023-12-28 PROCEDURE — 84145 PROCALCITONIN (PCT): CPT | Performed by: STUDENT IN AN ORGANIZED HEALTH CARE EDUCATION/TRAINING PROGRAM

## 2023-12-28 PROCEDURE — 83605 ASSAY OF LACTIC ACID: CPT | Performed by: STUDENT IN AN ORGANIZED HEALTH CARE EDUCATION/TRAINING PROGRAM

## 2023-12-28 PROCEDURE — 87040 BLOOD CULTURE FOR BACTERIA: CPT | Performed by: STUDENT IN AN ORGANIZED HEALTH CARE EDUCATION/TRAINING PROGRAM

## 2023-12-28 PROCEDURE — 96367 TX/PROPH/DG ADDL SEQ IV INF: CPT

## 2023-12-28 PROCEDURE — 96365 THER/PROPH/DIAG IV INF INIT: CPT

## 2023-12-28 PROCEDURE — 85610 PROTHROMBIN TIME: CPT | Performed by: STUDENT IN AN ORGANIZED HEALTH CARE EDUCATION/TRAINING PROGRAM

## 2023-12-28 PROCEDURE — 85730 THROMBOPLASTIN TIME PARTIAL: CPT | Performed by: STUDENT IN AN ORGANIZED HEALTH CARE EDUCATION/TRAINING PROGRAM

## 2023-12-28 RX ORDER — HYDROMORPHONE HYDROCHLORIDE 1 MG/ML
0.5 INJECTION, SOLUTION INTRAMUSCULAR; INTRAVENOUS; SUBCUTANEOUS
Status: DISCONTINUED | OUTPATIENT
Start: 2023-12-28 | End: 2023-12-30 | Stop reason: HOSPADM

## 2023-12-28 RX ORDER — ACETAMINOPHEN 650 MG/1
650 SUPPOSITORY RECTAL ONCE
Status: COMPLETED | OUTPATIENT
Start: 2023-12-28 | End: 2023-12-29

## 2023-12-28 RX ORDER — ACETAMINOPHEN 650 MG/1
650 SUPPOSITORY RECTAL ONCE
Status: COMPLETED | OUTPATIENT
Start: 2023-12-28 | End: 2023-12-28

## 2023-12-28 RX ADMIN — VANCOMYCIN HYDROCHLORIDE 1250 MG: 10 INJECTION, POWDER, LYOPHILIZED, FOR SOLUTION INTRAVENOUS at 19:36

## 2023-12-28 RX ADMIN — IOPAMIDOL 100 ML: 612 INJECTION, SOLUTION INTRAVENOUS at 22:34

## 2023-12-28 RX ADMIN — CEFEPIME 2000 MG: 2 INJECTION, POWDER, FOR SOLUTION INTRAVENOUS at 18:21

## 2023-12-28 RX ADMIN — ACETAMINOPHEN 650 MG: 650 SUPPOSITORY RECTAL at 19:40

## 2023-12-28 RX ADMIN — HYDROMORPHONE HYDROCHLORIDE 1 MG: 1 INJECTION, SOLUTION INTRAMUSCULAR; INTRAVENOUS; SUBCUTANEOUS at 21:57

## 2023-12-28 RX ADMIN — SODIUM CHLORIDE, POTASSIUM CHLORIDE, SODIUM LACTATE AND CALCIUM CHLORIDE 1770 ML: 600; 310; 30; 20 INJECTION, SOLUTION INTRAVENOUS at 18:14

## 2023-12-29 VITALS
RESPIRATION RATE: 22 BRPM | HEART RATE: 136 BPM | HEIGHT: 60 IN | OXYGEN SATURATION: 98 % | WEIGHT: 130 LBS | SYSTOLIC BLOOD PRESSURE: 134 MMHG | TEMPERATURE: 102 F | BODY MASS INDEX: 25.52 KG/M2 | DIASTOLIC BLOOD PRESSURE: 88 MMHG

## 2023-12-29 PROCEDURE — 25010000002 HYDROMORPHONE PER 4 MG: Performed by: STUDENT IN AN ORGANIZED HEALTH CARE EDUCATION/TRAINING PROGRAM

## 2023-12-29 PROCEDURE — 96367 TX/PROPH/DG ADDL SEQ IV INF: CPT

## 2023-12-29 PROCEDURE — 25010000002 MEROPENEM PER 100 MG: Performed by: STUDENT IN AN ORGANIZED HEALTH CARE EDUCATION/TRAINING PROGRAM

## 2023-12-29 PROCEDURE — 96376 TX/PRO/DX INJ SAME DRUG ADON: CPT

## 2023-12-29 PROCEDURE — 94799 UNLISTED PULMONARY SVC/PX: CPT

## 2023-12-29 RX ORDER — ACETAMINOPHEN 650 MG/1
650 SUPPOSITORY RECTAL ONCE
Status: COMPLETED | OUTPATIENT
Start: 2023-12-29 | End: 2023-12-29

## 2023-12-29 RX ADMIN — ACETAMINOPHEN 650 MG: 650 SUPPOSITORY RECTAL at 01:23

## 2023-12-29 RX ADMIN — HYDROMORPHONE HYDROCHLORIDE 0.5 MG: 1 INJECTION, SOLUTION INTRAMUSCULAR; INTRAVENOUS; SUBCUTANEOUS at 20:00

## 2023-12-29 RX ADMIN — ACETAMINOPHEN 650 MG: 650 SUPPOSITORY RECTAL at 13:05

## 2023-12-29 RX ADMIN — HYDROMORPHONE HYDROCHLORIDE 0.5 MG: 1 INJECTION, SOLUTION INTRAMUSCULAR; INTRAVENOUS; SUBCUTANEOUS at 12:50

## 2023-12-29 RX ADMIN — HYDROMORPHONE HYDROCHLORIDE 0.5 MG: 1 INJECTION, SOLUTION INTRAMUSCULAR; INTRAVENOUS; SUBCUTANEOUS at 21:39

## 2023-12-29 RX ADMIN — HYDROMORPHONE HYDROCHLORIDE 0.5 MG: 1 INJECTION, SOLUTION INTRAMUSCULAR; INTRAVENOUS; SUBCUTANEOUS at 17:00

## 2023-12-29 RX ADMIN — MEROPENEM 1000 MG: 1 INJECTION, POWDER, FOR SOLUTION INTRAVENOUS at 01:24

## 2023-12-29 RX ADMIN — HYDROMORPHONE HYDROCHLORIDE 0.5 MG: 1 INJECTION, SOLUTION INTRAMUSCULAR; INTRAVENOUS; SUBCUTANEOUS at 06:54

## 2023-12-29 RX ADMIN — ACETAMINOPHEN 650 MG: 650 SUPPOSITORY RECTAL at 21:47

## 2023-12-29 NOTE — ED NOTES
Spoke with [facility] on the phone and they state that the transfer team coming up is not licensed in [place] and they will try the transfer team that comes in at [phone].

## 2023-12-29 NOTE — ED NOTES
Pt repositioned in the bed and pillow placed under hip on left side to relieve pressure. Pt Nephrostomy and fc emptied and 300 was out from both bags. Pt is resting comfortably at this time.

## 2023-12-29 NOTE — ED PROVIDER NOTES
Subjective   History of Present Illness  Arrives from the nursing home.  EMS states that the nursing home was concerned for blood in the nephrostomy tube bag and in the Mojica bag.  They state that she was also febrile there.  EMS states that they were not able to interact with her as she is nonverbal at baseline.        Review of Systems   All other systems reviewed and are negative.      Past Medical History:   Diagnosis Date    Acute kidney failure, unspecified     Angina at rest     Anoxic brain damage, not elsewhere classified     Chronic pulmonary embolism     Chronic respiratory failure, unspecified whether with hypoxia or hypercapnia     Dependence on respirator (ventilator) status     Gastrointestinal hemorrhage, unspecified     Hypercalcemia     Iron deficiency anemia     Metabolic encephalopathy     Migraine     Sees Pain Management for injections.     MVP (mitral valve prolapse)     Other dysphagia     Other pulmonary embolism with acute cor pulmonale     Renal disorder     Ruptured bladder     Syncope     Urinary tract infection, site not specified        Allergies   Allergen Reactions    Coconut Unknown - High Severity    Levaquin [Levofloxacin] Other (See Comments)     unknown    Nuts Unknown - High Severity    Penicillins     [place] Other (See Comments)     Causes migraines per pt reports       Past Surgical History:   Procedure Laterality Date    ABDOMINAL SURGERY      BREAST BIOPSY Right 2011    benign    GTUBE INSERTION      INSERTION HEMODIALYSIS CATHETER Left 09/16/2021    Procedure: HEMODIALYSIS CATHETER INSERTION;  Surgeon: Anders Kaur MD;  Location: [place] [address];  Service: Vascular;  Laterality: Left;    TONSILLECTOMY      TRACHEOSTOMY         Family History   Problem Relation Age of Onset    Colon cancer Maternal Aunt 52    Fibromyalgia Mother     Heart disease Mother     Hypertension Mother     Hodgkin's lymphoma Paternal Grandmother     Ovarian cancer Neg Hx     Breast  cancer Neg Hx        Social History     Socioeconomic History    Marital status:    Tobacco Use    Smoking status: Never    Smokeless tobacco: Never   Vaping Use    Vaping Use: Never used   Substance and Sexual Activity    Alcohol use: No    Drug use: No           Objective   Physical Exam  Vitals and nursing note reviewed.   Constitutional:       General: She is in acute distress.      Appearance: Normal appearance. She is ill-appearing (chronically). She is not toxic-appearing or diaphoretic.   HENT:      Head: Normocephalic and atraumatic.      Right Ear: External ear normal.      Left Ear: External ear normal.      Nose: Nose normal.      Mouth/Throat:      Mouth: Mucous membranes are moist.   Eyes:      General:         Right eye: No discharge.         Left eye: No discharge.      Conjunctiva/sclera: Conjunctivae normal.   Cardiovascular:      Rate and Rhythm: Regular rhythm. Tachycardia present.      Pulses: Normal pulses.   Pulmonary:      Effort: Pulmonary effort is normal. No respiratory distress.      Breath sounds: No rhonchi.      Comments: On trach-collar    Abdominal:      Tenderness: There is no rebound.      Comments: Left sided nephrostomy tube and bag in place.   Musculoskeletal:         General: Deformity: contractures present at baseline.   Skin:     General: Skin is warm.      Coloration: Skin is not jaundiced.   Neurological:      Mental Status: Mental status is at baseline.         Procedures           ED Course                                             Medical Decision Making  48-year-old female presented today with fever and tachycardia and concern of blood in the Mojica or nephrostomy.  Workup so far reveals leukocytosis and fever likely in the setting of a possible UTI.  Discussed this with the [place] urologists who were okay with the patient being transferred for possible nephrostomy tube exchange with IR.  Given that we do not have IR and our urologist have signed off on this  patient I do think that is reasonable to send the patient as this likely is a source of her fever and could benefit from this.  Patient was transferred in stable condition after being in broad-spectrum antibiotics and pain medication and fluids here.    Problems Addressed:  Sepsis, due to unspecified organism, unspecified whether acute organ dysfunction present: complicated acute illness or injury    Amount and/or Complexity of Data Reviewed  Labs: ordered.  Radiology: ordered.    Risk  OTC drugs.  Prescription drug management.        Final diagnoses:   Sepsis, due to unspecified organism, unspecified whether acute organ dysfunction present       ED Disposition  ED Disposition       ED Disposition   Transfer to Another Facility     Condition   --    Comment   --               No follow-up provider specified.       Medication List      No changes were made to your prescriptions during this visit.            Kristi Lenz MD  12/31/23 0192

## 2023-12-30 LAB — BACTERIA SPEC AEROBE CULT: ABNORMAL

## 2023-12-30 NOTE — ED NOTES
[facility] Janiya states that the crew is about to depart the facility and will arrive to  pt at 2100.

## 2024-01-02 LAB
BACTERIA SPEC AEROBE CULT: NORMAL
BACTERIA SPEC AEROBE CULT: NORMAL

## 2024-01-10 ENCOUNTER — HOSPITAL ENCOUNTER (EMERGENCY)
Facility: HOSPITAL | Age: 49
Discharge: NURSING, REHAB OR LONG-TERM CARE | End: 2024-01-10
Attending: EMERGENCY MEDICINE | Admitting: EMERGENCY MEDICINE
Payer: MEDICARE

## 2024-01-10 ENCOUNTER — APPOINTMENT (OUTPATIENT)
Dept: GENERAL RADIOLOGY | Facility: HOSPITAL | Age: 49
End: 2024-01-10
Payer: MEDICARE

## 2024-01-10 ENCOUNTER — APPOINTMENT (OUTPATIENT)
Dept: CT IMAGING | Facility: HOSPITAL | Age: 49
End: 2024-01-10
Payer: MEDICARE

## 2024-01-10 VITALS
OXYGEN SATURATION: 97 % | TEMPERATURE: 97.8 F | BODY MASS INDEX: 26.68 KG/M2 | HEART RATE: 56 BPM | WEIGHT: 135.9 LBS | RESPIRATION RATE: 20 BRPM | HEIGHT: 60 IN | DIASTOLIC BLOOD PRESSURE: 60 MMHG | SYSTOLIC BLOOD PRESSURE: 108 MMHG

## 2024-01-10 DIAGNOSIS — Z86.69 HISTORY OF ANOXIC BRAIN INJURY: Primary | ICD-10-CM

## 2024-01-10 LAB
ALBUMIN SERPL-MCNC: 3.6 G/DL (ref 3.5–5.2)
ALBUMIN/GLOB SERPL: 1.3 G/DL
ALP SERPL-CCNC: 62 U/L (ref 39–117)
ALT SERPL W P-5'-P-CCNC: 18 U/L (ref 1–33)
ANION GAP SERPL CALCULATED.3IONS-SCNC: 9 MMOL/L (ref 5–15)
AST SERPL-CCNC: 20 U/L (ref 1–32)
ATMOSPHERIC PRESS: 743 MMHG
B PARAPERT DNA SPEC QL NAA+PROBE: NOT DETECTED
B PERT DNA SPEC QL NAA+PROBE: NOT DETECTED
BASE EXCESS BLDV CALC-SCNC: 5.2 MMOL/L (ref 0–2)
BASOPHILS # BLD AUTO: 0.05 10*3/MM3 (ref 0–0.2)
BASOPHILS NFR BLD AUTO: 0.5 % (ref 0–1.5)
BDY SITE: ABNORMAL
BILIRUB SERPL-MCNC: 0.6 MG/DL (ref 0–1.2)
BODY TEMPERATURE: 37
BUN SERPL-MCNC: 18 MG/DL (ref 6–20)
BUN/CREAT SERPL: 45 (ref 7–25)
C PNEUM DNA NPH QL NAA+NON-PROBE: NOT DETECTED
CALCIUM SPEC-SCNC: 9.1 MG/DL (ref 8.6–10.5)
CHLORIDE SERPL-SCNC: 101 MMOL/L (ref 98–107)
CO2 SERPL-SCNC: 28 MMOL/L (ref 22–29)
CREAT SERPL-MCNC: 0.4 MG/DL (ref 0.57–1)
CRP SERPL-MCNC: <0.3 MG/DL (ref 0–0.5)
D-LACTATE SERPL-SCNC: 1.8 MMOL/L (ref 0.5–2)
D-LACTATE SERPL-SCNC: 2.4 MMOL/L (ref 0.5–2)
DEPRECATED RDW RBC AUTO: 50.8 FL (ref 37–54)
EGFRCR SERPLBLD CKD-EPI 2021: 123.8 ML/MIN/1.73
EOSINOPHIL # BLD AUTO: 0.46 10*3/MM3 (ref 0–0.4)
EOSINOPHIL NFR BLD AUTO: 5 % (ref 0.3–6.2)
ERYTHROCYTE [DISTWIDTH] IN BLOOD BY AUTOMATED COUNT: 14.6 % (ref 12.3–15.4)
FLUAV SUBTYP SPEC NAA+PROBE: NOT DETECTED
FLUBV RNA ISLT QL NAA+PROBE: NOT DETECTED
GAS FLOW AIRWAY: 6 LPM
GLOBULIN UR ELPH-MCNC: 2.8 GM/DL
GLUCOSE BLDC GLUCOMTR-MCNC: 151 MG/DL (ref 70–130)
GLUCOSE SERPL-MCNC: 145 MG/DL (ref 65–99)
HADV DNA SPEC NAA+PROBE: NOT DETECTED
HCO3 BLDV-SCNC: 31.4 MMOL/L (ref 22–28)
HCOV 229E RNA SPEC QL NAA+PROBE: NOT DETECTED
HCOV HKU1 RNA SPEC QL NAA+PROBE: NOT DETECTED
HCOV NL63 RNA SPEC QL NAA+PROBE: NOT DETECTED
HCOV OC43 RNA SPEC QL NAA+PROBE: NOT DETECTED
HCT VFR BLD AUTO: 30.8 % (ref 34–46.6)
HGB BLD-MCNC: 9.4 G/DL (ref 12–15.9)
HMPV RNA NPH QL NAA+NON-PROBE: NOT DETECTED
HPIV1 RNA ISLT QL NAA+PROBE: NOT DETECTED
HPIV2 RNA SPEC QL NAA+PROBE: NOT DETECTED
HPIV3 RNA NPH QL NAA+PROBE: NOT DETECTED
HPIV4 P GENE NPH QL NAA+PROBE: NOT DETECTED
IMM GRANULOCYTES # BLD AUTO: 0.07 10*3/MM3 (ref 0–0.05)
IMM GRANULOCYTES NFR BLD AUTO: 0.8 % (ref 0–0.5)
INHALED O2 CONCENTRATION: 28 %
INR PPP: 0.94 (ref 0.91–1.09)
LYMPHOCYTES # BLD AUTO: 1.72 10*3/MM3 (ref 0.7–3.1)
LYMPHOCYTES NFR BLD AUTO: 18.8 % (ref 19.6–45.3)
Lab: ABNORMAL
M PNEUMO IGG SER IA-ACNC: NOT DETECTED
MAGNESIUM SERPL-MCNC: 2.4 MG/DL (ref 1.6–2.6)
MCH RBC QN AUTO: 30.2 PG (ref 26.6–33)
MCHC RBC AUTO-ENTMCNC: 30.5 G/DL (ref 31.5–35.7)
MCV RBC AUTO: 99 FL (ref 79–97)
MODALITY: ABNORMAL
MONOCYTES # BLD AUTO: 0.52 10*3/MM3 (ref 0.1–0.9)
MONOCYTES NFR BLD AUTO: 5.7 % (ref 5–12)
NEUTROPHILS NFR BLD AUTO: 6.32 10*3/MM3 (ref 1.7–7)
NEUTROPHILS NFR BLD AUTO: 69.2 % (ref 42.7–76)
NRBC BLD AUTO-RTO: 0 /100 WBC (ref 0–0.2)
PCO2 BLDV: 53.7 MM HG (ref 41–51)
PH BLDV: 7.38 PH UNITS (ref 7.32–7.42)
PLATELET # BLD AUTO: 245 10*3/MM3 (ref 140–450)
PMV BLD AUTO: 10.4 FL (ref 6–12)
PO2 BLDV: 39.7 MM HG (ref 27–53)
POTASSIUM SERPL-SCNC: 4.7 MMOL/L (ref 3.5–5.2)
PROCALCITONIN SERPL-MCNC: 0.21 NG/ML (ref 0–0.25)
PROT SERPL-MCNC: 6.4 G/DL (ref 6–8.5)
PROTHROMBIN TIME: 12.7 SECONDS (ref 11.8–14.8)
RBC # BLD AUTO: 3.11 10*6/MM3 (ref 3.77–5.28)
RHINOVIRUS RNA SPEC NAA+PROBE: NOT DETECTED
RSV RNA NPH QL NAA+NON-PROBE: NOT DETECTED
SAO2 % BLDCOV: 68.7 % (ref 45–75)
SARS-COV-2 RNA NPH QL NAA+NON-PROBE: NOT DETECTED
SODIUM SERPL-SCNC: 138 MMOL/L (ref 136–145)
VENTILATOR MODE: ABNORMAL
WBC NRBC COR # BLD AUTO: 9.14 10*3/MM3 (ref 3.4–10.8)

## 2024-01-10 PROCEDURE — 80053 COMPREHEN METABOLIC PANEL: CPT | Performed by: EMERGENCY MEDICINE

## 2024-01-10 PROCEDURE — 85025 COMPLETE CBC W/AUTO DIFF WBC: CPT | Performed by: EMERGENCY MEDICINE

## 2024-01-10 PROCEDURE — 0202U NFCT DS 22 TRGT SARS-COV-2: CPT | Performed by: EMERGENCY MEDICINE

## 2024-01-10 PROCEDURE — 83605 ASSAY OF LACTIC ACID: CPT | Performed by: EMERGENCY MEDICINE

## 2024-01-10 PROCEDURE — 71045 X-RAY EXAM CHEST 1 VIEW: CPT

## 2024-01-10 PROCEDURE — 84145 PROCALCITONIN (PCT): CPT | Performed by: EMERGENCY MEDICINE

## 2024-01-10 PROCEDURE — 36415 COLL VENOUS BLD VENIPUNCTURE: CPT

## 2024-01-10 PROCEDURE — 74176 CT ABD & PELVIS W/O CONTRAST: CPT

## 2024-01-10 PROCEDURE — 85610 PROTHROMBIN TIME: CPT | Performed by: EMERGENCY MEDICINE

## 2024-01-10 PROCEDURE — 87040 BLOOD CULTURE FOR BACTERIA: CPT | Performed by: EMERGENCY MEDICINE

## 2024-01-10 PROCEDURE — 25810000003 LACTATED RINGERS SOLUTION: Performed by: EMERGENCY MEDICINE

## 2024-01-10 PROCEDURE — 86140 C-REACTIVE PROTEIN: CPT | Performed by: EMERGENCY MEDICINE

## 2024-01-10 PROCEDURE — 99284 EMERGENCY DEPT VISIT MOD MDM: CPT

## 2024-01-10 PROCEDURE — 83735 ASSAY OF MAGNESIUM: CPT | Performed by: EMERGENCY MEDICINE

## 2024-01-10 PROCEDURE — 82803 BLOOD GASES ANY COMBINATION: CPT

## 2024-01-10 PROCEDURE — 82948 REAGENT STRIP/BLOOD GLUCOSE: CPT

## 2024-01-10 RX ORDER — SODIUM CHLORIDE 0.9 % (FLUSH) 0.9 %
10 SYRINGE (ML) INJECTION AS NEEDED
Status: DISCONTINUED | OUTPATIENT
Start: 2024-01-10 | End: 2024-01-10 | Stop reason: HOSPADM

## 2024-01-10 RX ADMIN — SODIUM CHLORIDE, POTASSIUM CHLORIDE, SODIUM LACTATE AND CALCIUM CHLORIDE 1000 ML: 600; 310; 30; 20 INJECTION, SOLUTION INTRAVENOUS at 12:47

## 2024-01-10 NOTE — ED PROVIDER NOTES
Subjective   History of Present Illness  48-year-old female presents to the emergency department from [facility] with complaint of low blood pressure.  Patient is chronically debilitated following anoxic brain injuries she suffered a couple years ago secondary to COVID-pneumonia and chronic PE, G-tube dependent, trach dependent, chronic renal calculus status post left nephroureterostomy tubes, chronic indwelling Mojica catheter.  Recent hospitalization at [facility] for sepsis secondary to Proteus urinary tract infection, was discharged to [facility] yesterday on oral linezolid twice daily.  Patient is nonverbal unable to provide any history.  This morning, nursing staff at [facility] reported patient had a low blood pressure, 90s systolic.  Of note, patient has low blood pressures at baseline and was 102/58 upon discharge from [facility] yesterday.  Paramedics were called and when he arrived on scene blood pressure was 100/60, temp 97.9.  No reports of cough, hypoxia, rash, change in urine output.  No additional information able to be obtained.    History provided by:  Patient      Review of Systems   All other systems reviewed and are negative.      Past Medical History:   Diagnosis Date    Acute kidney failure, unspecified     Angina at rest     Anoxic brain damage, not elsewhere classified     Chronic pulmonary embolism     Chronic respiratory failure, unspecified whether with hypoxia or hypercapnia     Dependence on respirator (ventilator) status     Gastrointestinal hemorrhage, unspecified     Hypercalcemia     Iron deficiency anemia     Metabolic encephalopathy     Migraine     Sees Pain Management for injections.     MVP (mitral valve prolapse)     Other dysphagia     Other pulmonary embolism with acute cor pulmonale     Renal disorder     Ruptured bladder     Syncope     Urinary tract infection, site not  specified        Allergies   Allergen Reactions    Coconut Unknown - High Severity    Levaquin [Levofloxacin] Other (See Comments)     unknown    Nuts Unknown - High Severity    Penicillins     Turkey Other (See Comments)     Causes migraines per pt reports       Past Surgical History:   Procedure Laterality Date    ABDOMINAL SURGERY      BREAST BIOPSY Right 2011    benign    GTUBE INSERTION      INSERTION HEMODIALYSIS CATHETER Left 09/16/2021    Procedure: HEMODIALYSIS CATHETER INSERTION;  Surgeon: Anders Kaur MD;  Location: [place] [id number];  Service: Vascular;  Laterality: Left;    TONSILLECTOMY      TRACHEOSTOMY         Family History   Problem Relation Age of Onset    Colon cancer Maternal Aunt 52    Fibromyalgia Mother     Heart disease Mother     Hypertension Mother     Hodgkin's lymphoma Paternal Grandmother     Ovarian cancer Neg Hx     Breast cancer Neg Hx        Social History     Socioeconomic History    Marital status:    Tobacco Use    Smoking status: Never    Smokeless tobacco: Never   Vaping Use    Vaping Use: Never used   Substance and Sexual Activity    Alcohol use: No    Drug use: No           Objective   Physical Exam  Vitals and nursing note reviewed.   Constitutional:       Comments: Patient is awake, nonverbal, chronic ill-appearing but does not appear acutely ill   HENT:      Head: Normocephalic and atraumatic.      Nose: Nose normal. No congestion or rhinorrhea.      Mouth/Throat:      Mouth: Mucous membranes are moist.   Eyes:      Extraocular Movements: Extraocular movements intact.      Conjunctiva/sclera: Conjunctivae normal.      Pupils: Pupils are equal, round, and reactive to light.   Neck:      Comments: Trach present, trach site clean and dry  Cardiovascular:      Rate and Rhythm: Normal rate and regular rhythm.      Heart sounds: Normal heart sounds. No murmur heard.  Pulmonary:      Effort: Pulmonary effort is normal.      Breath sounds: Normal breath sounds.  No wheezing, rhonchi or rales.   Abdominal:      General: Abdomen is flat. Bowel sounds are normal.      Palpations: Abdomen is soft.      Tenderness: There is no abdominal tenderness. There is no guarding.      Comments: G-tube present  Nephrostomy tube present   Musculoskeletal:      Comments: Contractures bilateral upper and lower extremities   Skin:     General: Skin is warm and dry.      Capillary Refill: Capillary refill takes less than 2 seconds.      Comments: No pressure ulcers present         Procedures         Lab Results (last 24 hours)       Procedure Component Value Units Date/Time    POC Glucose Once [[id number]]  (Abnormal) Collected: 01/10/24 1212    Specimen: Blood Updated: 01/10/24 1223     Glucose 151 mg/dL      Comment: : [id number] Milo HigginsMeter ID: [id number]       CBC & Differential [[id number]]  (Abnormal) Collected: 01/10/24 1246    Specimen: Blood Updated: 01/10/24 1302    Narrative:      The following orders were created for panel order CBC & Differential.  Procedure                               Abnormality         Status                     ---------                               -----------         ------                     CBC Auto Differential[[id number]]        Abnormal            Final result                 Please view results for these tests on the individual orders.    Comprehensive Metabolic Panel [[id number]]  (Abnormal) Collected: 01/10/24 1246    Specimen: Blood Updated: 01/10/24 1326     Glucose 145 mg/dL      BUN 18 mg/dL      Creatinine 0.40 mg/dL      Sodium 138 mmol/L      Potassium 4.7 mmol/L      Chloride 101 mmol/L      CO2 28.0 mmol/L      Calcium 9.1 mg/dL      Total Protein 6.4 g/dL      Albumin 3.6 g/dL      ALT (SGPT) 18 U/L      AST (SGOT) 20 U/L      Alkaline Phosphatase 62 U/L      Total Bilirubin 0.6 mg/dL      Globulin 2.8 gm/dL      A/G Ratio 1.3 g/dL      BUN/Creatinine Ratio 45.0     Anion Gap 9.0 mmol/L      eGFR 123.8 mL/min/1.73     Narrative:   "    GFR Normal >60  Chronic Kidney Disease <60  Kidney Failure <15      Protime-INR [[id number]]  (Normal) Collected: 01/10/24 1246    Specimen: Blood Updated: 01/10/24 1313     Protime 12.7 Seconds      INR 0.94    Magnesium [[id number]]  (Normal) Collected: 01/10/24 1246    Specimen: Blood Updated: 01/10/24 1326     Magnesium 2.4 mg/dL     Blood Culture - Blood, Hand, Right [[id number]] Collected: 01/10/24 1246    Specimen: Blood from Hand, Right Updated: 01/10/24 1302    Blood Culture - Blood, Hand, Left [[id number]] Collected: 01/10/24 1246    Specimen: Blood from Hand, Left Updated: 01/10/24 1302    Lactic Acid, Plasma [[id number]]  (Abnormal) Collected: 01/10/24 1246    Specimen: Blood Updated: 01/10/24 1333     Lactate 2.4 mmol/L     Procalcitonin [[id number]]  (Normal) Collected: 01/10/24 1246    Specimen: Blood Updated: 01/10/24 1330     Procalcitonin 0.21 ng/mL     Narrative:      As a Marker for Sepsis (Non-Neonates):    1. <0.5 ng/mL represents a low risk of severe sepsis and/or septic shock.  2. >2 ng/mL represents a high risk of severe sepsis and/or septic shock.    As a Marker for Lower Respiratory Tract Infections that require antibiotic therapy:    PCT on Admission    Antibiotic Therapy       6-12 Hrs later    >0.5                Strongly Recommended  >0.25 - <0.5        Recommended   0.1 - 0.25          Discouraged              Remeasure/reassess PCT  <0.1                Strongly Discouraged     Remeasure/reassess PCT    As 28 day mortality risk marker: \"Change in Procalcitonin Result\" (>80% or <=80%) if Day 0 (or Day 1) and Day 4 values are available. Refer to http://www.[facility]-pct-calculator.com    Change in PCT <=80%  A decrease of PCT levels below or equal to 80% defines a positive change in PCT test result representing a higher risk for 28-day all-cause mortality of patients diagnosed with severe sepsis for septic shock.    Change in PCT >80%  A decrease of PCT levels of more than 80% defines a " negative change in PCT result representing a lower risk for 28-day all-cause mortality of patients diagnosed with severe sepsis or septic shock.       C-reactive Protein [[id number]]  (Normal) Collected: 01/10/24 1246    Specimen: Blood Updated: 01/10/24 1327     C-Reactive Protein <0.30 mg/dL     Respiratory Panel PCR w/COVID-19(SARS-CoV-2) DEVI/RICHARD/MIKE/PAD/COR/HUMPHREY In-House, NP Swab in UTM/VTM, 2 HR TAT - Swab, Nasopharynx [[id number]]  (Normal) Collected: 01/10/24 1246    Specimen: Swab from Nasopharynx Updated: 01/10/24 1402     ADENOVIRUS, PCR Not Detected     Coronavirus 229E Not Detected     Coronavirus HKU1 Not Detected     Coronavirus NL63 Not Detected     Coronavirus OC43 Not Detected     COVID19 Not Detected     Human Metapneumovirus Not Detected     Human Rhinovirus/Enterovirus Not Detected     Influenza A PCR Not Detected     Influenza B PCR Not Detected     Parainfluenza Virus 1 Not Detected     Parainfluenza Virus 2 Not Detected     Parainfluenza Virus 3 Not Detected     Parainfluenza Virus 4 Not Detected     RSV, PCR Not Detected     Bordetella pertussis pcr Not Detected     Bordetella parapertussis PCR Not Detected     Chlamydophila pneumoniae PCR Not Detected     Mycoplasma pneumo by PCR Not Detected    Narrative:      In the setting of a positive respiratory panel with a viral infection PLUS a negative procalcitonin without other underlying concern for bacterial infection, consider observing off antibiotics or discontinuation of antibiotics and continue supportive care. If the respiratory panel is positive for atypical bacterial infection (Bordetella pertussis, Chlamydophila pneumoniae, or Mycoplasma pneumoniae), consider antibiotic de-escalation to target atypical bacterial infection.    CBC Auto Differential [[id number]]  (Abnormal) Collected: 01/10/24 1246    Specimen: Blood Updated: 01/10/24 1302     WBC 9.14 10*3/mm3      RBC 3.11 10*6/mm3      Hemoglobin 9.4 g/dL      Hematocrit 30.8 %      MCV  99.0 fL      MCH 30.2 pg      MCHC 30.5 g/dL      RDW 14.6 %      RDW-SD 50.8 fl      MPV 10.4 fL      Platelets 245 10*3/mm3      Neutrophil % 69.2 %      Lymphocyte % 18.8 %      Monocyte % 5.7 %      Eosinophil % 5.0 %      Basophil % 0.5 %      Immature Grans % 0.8 %      Neutrophils, Absolute 6.32 10*3/mm3      Lymphocytes, Absolute 1.72 10*3/mm3      Monocytes, Absolute 0.52 10*3/mm3      Eosinophils, Absolute 0.46 10*3/mm3      Basophils, Absolute 0.05 10*3/mm3      Immature Grans, Absolute 0.07 10*3/mm3      nRBC 0.0 /100 WBC     Blood Gas, Venous - [[id number]]  (Abnormal) Collected: 01/10/24 1456    Specimen: Venous Blood Updated: 01/10/24 1454     Site OTHER     pH, Venous 7.376 pH Units      pCO2, Venous 53.7 mm Hg      Comment: 83 Value above reference range        pO2, Venous 39.7 mm Hg      HCO3, Venous 31.4 mmol/L      Comment: 83 Value above reference range        Base Excess, Venous 5.2 mmol/L      Comment: 83 Value above reference range        O2 Saturation, Venous 68.7 %      Temperature 37.0     Barometric Pressure for Blood Gas 743 mmHg      Modality Aerosol Trach Collar     FIO2 28 %      Flow Rate 6.0 lpm      Ventilator Mode NA     Collected by [id number]     Comment: Meter: [id number]     :  [id number]       STAT Lactic Acid, Reflex [[id number]] Collected: 01/10/24 1542    Specimen: Blood Updated: 01/10/24 1550         CT Abdomen Pelvis Stone Protocol    Result Date: 1/10/2024  EXAMINATION: CT ABDOMEN PELVIS STONE PROTOCOL-   1/10/2024 12:48 PM  HISTORY: sepsis, hx of infected stone  In order to have a CT radiation dose as low as reasonably achievable Automated Exposure Control was utilized for adjustment of the mA and/or KV according to patient size.  Total DLP = 841.96 mGy.cm   CT scan of the abdomen and pelvis is performed without intravenous contrast enhancement.  Images are acquired in axial plane and subsequent reconstruction in coronal and sagittal planes.  The comparison is  made with the previous study dated 12/28/2023.  The lung bases included in the study show atelectatic changes in the lower lungs. This is more pronounced in the right lower lung. There is a trace right basal pleural effusion.  Unenhanced liver and spleen are unremarkable.  No radiopaque gallstones.  The unenhanced pancreas is unremarkable. An isodense mass may not be evaluated or excluded.  The adrenal glands bilaterally are normal.  There is lobulation of renal contour bilaterally. There is a left posterior approach nephrostomy catheter with distal end in the left lower pole collecting system. There is also a left ureteral stent with proximal end in the renal pelvis and distal end in the urinary bladder. There is a mild ectasia of the left collecting system with moderate thickening of the wall of the renal pelvis and the entire left ureter similar to the previous study. There is a surrounding retroperitoneal fat infiltration. There is a large calculus in the right renal pelvis above the UPJ. It measures 16 mm in greatest dimension in coronal plane images. There is a mild ectasia of the right renal pelvis and the right ureter. No intrinsic abnormality/calculus in the right ureter. There is moderate thickening of the wall of the right renal pelvis and the right ureter similar to the left side. There are several small calculi projected in the left upper pole collecting system. The urinary bladder is decompressed with a Mojica catheter. There are several small radiopaque calculi at the floor of the urinary bladder between the bladder wall and the Mojica catheter.  There is a gastrostomy tube in place. The stomach is moderately distended with gas fluid and a small retained debris/food. Duodenum is mildly dilated. Small bowel is nondistended nondilated. No finding to suggest appendicitis. Significant large volume stool is seen in the colon similar to the previous studies. Significant large volume stool is seen in the  rectum.  There is significant laxity of the left lower abdomen with protrusion of the large bowel. No jennifer herniation.  There is an area of focal skin thickening with subcutaneous fat infiltration in the right lateral gluteal area which extends internally to the anterolateral aspect of the right gluteus bryan. This is similar to the previous study. There is also subcutaneous fat infiltration and nodularity in the right lower anterior abdominal wall similar to the previous study. This may represent soft tissue trauma or a prior subcutaneous injections?. There is an air bubble in the area.  Abdominal aorta and iliac arteries are unremarkable.  No evidence of abdominal or pelvic lymphadenopathy.  Images reviewed in bone window no acute bony abnormality. There is levoscoliosis of the thoracolumbar spine.      1. Evidence of bilateral subacute or chronic pyeloureterectasis similar to the previous study. 2. Bilateral nonobstructing renal calculi, the largest one in the right renal pelvis. 3. Left-sided percutaneous nephrostomy in place. Left-sided ureteral stent in place. Mild residual dilatation of the left pelvicalyceal structures is probably due to inflammatory process. 4. Urinary bladder calculi. Urinary bladder is poorly evaluated due to decompression by a Mojica catheter. 5. Significant large volume stool in the colon. Significant large volume in the rectum may suggest fecal impaction?. 6. Other soft tissue findings in the right gluteal area and right lower anterior abdominal wall as detailed above. This may be due to recent or previous trauma or inflammatory process/cellulitis.       This report was signed and finalized on 1/10/2024 2:26 PM by Dr. Lui Bush MD.      XR Chest 1 View    Result Date: 1/10/2024  XR CHEST 1 VW- 1/10/2024 11:46 AM  HISTORY: sepsis   COMPARISON: Chest x-ray dated 12/28/2023  FINDINGS: Upright frontal radiograph of the chest was obtained  Patient is positioned slightly left  "anterior oblique. A tracheostomy is in place. Chronic elevation of the right hemidiaphragm. Lungs are otherwise well expanded. Underlying bilateral interstitial coarsening which appears fibrotic. There is no new consolidation or pleural effusion. No pneumothorax. Heart size is stable. The pulmonary vasculature are nondilated. No acute bony abnormality is seen. Large volume colonic stool. Stomach appears somewhat dilated. No gross free air in the upper abdomen. Partially imaged gastrostomy tube.      1.  Tracheostomy is in place and appears in good position. 2.  There is an underlying mild fibrotic change throughout the interstitium. No superimposed new pulmonary infiltrate/consolidation. 3.  Stomach appears somewhat distended and there appears to be a gaseous distention of the colon as well.  This report was signed and finalized on 1/10/2024 1:26 PM by Dr Rob Ribeiro.      ED Course  ED Course as of 01/10/24 1553   Wed Murtaza 10, 2024   1548 Chronically debilitated 48-year-old female with history of anoxic brain injury, trach and PEG tube dependent, nephroureterostomy tubes, ureteral stents and Mojica catheter dependent, recent admission to [place] for sepsis secondary to ESBL UTI who presents to the ED with transient hypotension while at skilled nursing facility.  Patient's blood pressure has been normal in the ED.  Afebrile.  Oxygen saturations normal on home dose of oxygen via trach collar.  Minimally elevated lactate, 2.4.  Did receive some fluids in the ED.  WBC within normal limits, no bandemia.  Procalcitonin normal at 0.21, CRP normal at less than 0.30.  She remains on linezolid p.o. at the skilled nursing facility.  No evidence of new illness and BP has been normal throughout for our ED stay.  Spoke to nurse at patient's skilled nursing facility and she felt comfortable having the patient return, she states that they may have \"jumped the gun\" a little this morning.  Did encourage nursing staff to have the " patient return to ED for evaluation if she developed abnormal vital signs. [AW]      ED Course User Index  [AW] Mauro Trevino MD                                             Medical Decision Making  Amount and/or Complexity of Data Reviewed  Labs: ordered.  Radiology: ordered.    Risk  Prescription drug management.        Final diagnoses:   History of anoxic brain injury       ED Disposition  ED Disposition       ED Disposition   Discharge    Condition   Stable    Comment   --               David Lara MD  [address] [place] [address] [address]  [facility] [address]  [phone]    Schedule an appointment as soon as possible for a visit   As needed         Medication List      No changes were made to your prescriptions during this visit.            Mauro Trevino MD  01/10/24 6414

## 2024-01-10 NOTE — DISCHARGE INSTRUCTIONS
Mrs. Zabala was seen in the emergency department due to concern for low blood pressure at skilled nursing facility.  Her blood pressure has been within normal range since arrival to the emergency department.  Her labs were reassuring.  We do not feel she has any new infectious process occurring.  She did appear to be a little dehydrated, received fluids in the ED.  Recommend she continue her linezolid as prescribed and follow-up per [facility] recommendations.  Please have the patient return to our emergency department for repeat evaluation if she develops hypotension, fever, or change in her baseline mental status, or for any additional concerns

## 2024-01-15 LAB
BACTERIA SPEC AEROBE CULT: NORMAL
BACTERIA SPEC AEROBE CULT: NORMAL

## 2024-01-17 NOTE — PLAN OF CARE
Dental clearance form filled out and faxed back to provider.  Reina Ford RN on 1/17/2024 at 3:05 PM     Goal Outcome Evaluation:  Plan of Care Reviewed With: caregiver        Progress: no change  Outcome Evaluation: Ntn assessment completed per ntn risk screen of unsure of wt loss and enteral nutrition. Pt admitted with dislodged peg tube. No route for enteral ntn at this time. Pt currently receiving IVF's at 125 ml/hr. Recommend Isosource 1.5 at 20 ml/hr x 22hrs, then advance by 10 ml/hr every 6 hrs as tolerated until goal rate of 55 ml achieved. Routine water flush of 50 ml/hr per pump. Cont to follow for plan of care.

## 2024-01-29 ENCOUNTER — APPOINTMENT (OUTPATIENT)
Dept: GENERAL RADIOLOGY | Facility: HOSPITAL | Age: 49
End: 2024-01-29
Payer: MEDICARE

## 2024-01-29 ENCOUNTER — HOSPITAL ENCOUNTER (EMERGENCY)
Facility: HOSPITAL | Age: 49
Discharge: NURSING, REHAB OR LONG-TERM CARE | End: 2024-01-29
Attending: EMERGENCY MEDICINE | Admitting: EMERGENCY MEDICINE
Payer: MEDICARE

## 2024-01-29 ENCOUNTER — APPOINTMENT (OUTPATIENT)
Dept: CT IMAGING | Facility: HOSPITAL | Age: 49
End: 2024-01-29
Payer: MEDICARE

## 2024-01-29 VITALS
RESPIRATION RATE: 18 BRPM | WEIGHT: 135 LBS | BODY MASS INDEX: 26.5 KG/M2 | OXYGEN SATURATION: 99 % | DIASTOLIC BLOOD PRESSURE: 73 MMHG | SYSTOLIC BLOOD PRESSURE: 119 MMHG | TEMPERATURE: 100.8 F | HEART RATE: 92 BPM | HEIGHT: 60 IN

## 2024-01-29 DIAGNOSIS — N39.0 ACUTE UTI (URINARY TRACT INFECTION): Primary | ICD-10-CM

## 2024-01-29 LAB
ALBUMIN SERPL-MCNC: 4.3 G/DL (ref 3.5–5.2)
ALBUMIN/GLOB SERPL: 1.3 G/DL
ALP SERPL-CCNC: 65 U/L (ref 39–117)
ALT SERPL W P-5'-P-CCNC: 23 U/L (ref 1–33)
ANION GAP SERPL CALCULATED.3IONS-SCNC: 10 MMOL/L (ref 5–15)
AST SERPL-CCNC: 15 U/L (ref 1–32)
BACTERIA UR QL AUTO: ABNORMAL /HPF
BASOPHILS # BLD AUTO: 0.06 10*3/MM3 (ref 0–0.2)
BASOPHILS NFR BLD AUTO: 0.7 % (ref 0–1.5)
BILIRUB SERPL-MCNC: 0.4 MG/DL (ref 0–1.2)
BILIRUB UR QL STRIP: NEGATIVE
BUN SERPL-MCNC: 43 MG/DL (ref 6–20)
BUN/CREAT SERPL: 79.6 (ref 7–25)
CALCIUM SPEC-SCNC: 10.2 MG/DL (ref 8.6–10.5)
CHLORIDE SERPL-SCNC: 113 MMOL/L (ref 98–107)
CLARITY UR: ABNORMAL
CO2 SERPL-SCNC: 29 MMOL/L (ref 22–29)
COLOR UR: YELLOW
CREAT SERPL-MCNC: 0.54 MG/DL (ref 0.57–1)
D-LACTATE SERPL-SCNC: 0.9 MMOL/L (ref 0.5–2)
DEPRECATED RDW RBC AUTO: 56 FL (ref 37–54)
EGFRCR SERPLBLD CKD-EPI 2021: 115.2 ML/MIN/1.73
EOSINOPHIL # BLD AUTO: 0.26 10*3/MM3 (ref 0–0.4)
EOSINOPHIL NFR BLD AUTO: 3.1 % (ref 0.3–6.2)
ERYTHROCYTE [DISTWIDTH] IN BLOOD BY AUTOMATED COUNT: 15.1 % (ref 12.3–15.4)
FLUAV RNA RESP QL NAA+PROBE: NOT DETECTED
FLUBV RNA RESP QL NAA+PROBE: NOT DETECTED
GLOBULIN UR ELPH-MCNC: 3.3 GM/DL
GLUCOSE SERPL-MCNC: 104 MG/DL (ref 65–99)
GLUCOSE UR STRIP-MCNC: NEGATIVE MG/DL
HCT VFR BLD AUTO: 38.2 % (ref 34–46.6)
HGB BLD-MCNC: 12 G/DL (ref 12–15.9)
HGB UR QL STRIP.AUTO: ABNORMAL
HYALINE CASTS UR QL AUTO: ABNORMAL /LPF
IMM GRANULOCYTES # BLD AUTO: 0.04 10*3/MM3 (ref 0–0.05)
IMM GRANULOCYTES NFR BLD AUTO: 0.5 % (ref 0–0.5)
KETONES UR QL STRIP: NEGATIVE
LEUKOCYTE ESTERASE UR QL STRIP.AUTO: ABNORMAL
LYMPHOCYTES # BLD AUTO: 1.68 10*3/MM3 (ref 0.7–3.1)
LYMPHOCYTES NFR BLD AUTO: 20.2 % (ref 19.6–45.3)
MAGNESIUM SERPL-MCNC: 2.7 MG/DL (ref 1.6–2.6)
MCH RBC QN AUTO: 31.4 PG (ref 26.6–33)
MCHC RBC AUTO-ENTMCNC: 31.4 G/DL (ref 31.5–35.7)
MCV RBC AUTO: 100 FL (ref 79–97)
MONOCYTES # BLD AUTO: 0.63 10*3/MM3 (ref 0.1–0.9)
MONOCYTES NFR BLD AUTO: 7.6 % (ref 5–12)
NEUTROPHILS NFR BLD AUTO: 5.66 10*3/MM3 (ref 1.7–7)
NEUTROPHILS NFR BLD AUTO: 67.9 % (ref 42.7–76)
NITRITE UR QL STRIP: POSITIVE
NRBC BLD AUTO-RTO: 0 /100 WBC (ref 0–0.2)
PH UR STRIP.AUTO: 8.5 [PH] (ref 5–8)
PLATELET # BLD AUTO: 397 10*3/MM3 (ref 140–450)
PMV BLD AUTO: 10.4 FL (ref 6–12)
POTASSIUM SERPL-SCNC: 4.3 MMOL/L (ref 3.5–5.2)
PROT SERPL-MCNC: 7.6 G/DL (ref 6–8.5)
PROT UR QL STRIP: ABNORMAL
RBC # BLD AUTO: 3.82 10*6/MM3 (ref 3.77–5.28)
RBC # UR STRIP: ABNORMAL /HPF
REF LAB TEST METHOD: ABNORMAL
SARS-COV-2 RNA RESP QL NAA+PROBE: NOT DETECTED
SODIUM SERPL-SCNC: 152 MMOL/L (ref 136–145)
SP GR UR STRIP: 1.02 (ref 1–1.03)
SQUAMOUS #/AREA URNS HPF: ABNORMAL /HPF
TRI-PHOS CRY URNS QL MICRO: ABNORMAL /HPF
UROBILINOGEN UR QL STRIP: ABNORMAL
WBC # UR STRIP: ABNORMAL /HPF
WBC NRBC COR # BLD AUTO: 8.33 10*3/MM3 (ref 3.4–10.8)
YEAST URNS QL MICRO: ABNORMAL /HPF

## 2024-01-29 PROCEDURE — 83735 ASSAY OF MAGNESIUM: CPT | Performed by: EMERGENCY MEDICINE

## 2024-01-29 PROCEDURE — 51702 INSERT TEMP BLADDER CATH: CPT

## 2024-01-29 PROCEDURE — 74176 CT ABD & PELVIS W/O CONTRAST: CPT

## 2024-01-29 PROCEDURE — 87186 SC STD MICRODIL/AGAR DIL: CPT | Performed by: EMERGENCY MEDICINE

## 2024-01-29 PROCEDURE — 83605 ASSAY OF LACTIC ACID: CPT | Performed by: EMERGENCY MEDICINE

## 2024-01-29 PROCEDURE — 87040 BLOOD CULTURE FOR BACTERIA: CPT | Performed by: EMERGENCY MEDICINE

## 2024-01-29 PROCEDURE — 85025 COMPLETE CBC W/AUTO DIFF WBC: CPT | Performed by: EMERGENCY MEDICINE

## 2024-01-29 PROCEDURE — 81001 URINALYSIS AUTO W/SCOPE: CPT | Performed by: EMERGENCY MEDICINE

## 2024-01-29 PROCEDURE — 87086 URINE CULTURE/COLONY COUNT: CPT | Performed by: EMERGENCY MEDICINE

## 2024-01-29 PROCEDURE — 80053 COMPREHEN METABOLIC PANEL: CPT | Performed by: EMERGENCY MEDICINE

## 2024-01-29 PROCEDURE — 25810000003 SODIUM CHLORIDE 0.9 % SOLUTION: Performed by: EMERGENCY MEDICINE

## 2024-01-29 PROCEDURE — 36415 COLL VENOUS BLD VENIPUNCTURE: CPT

## 2024-01-29 PROCEDURE — 96365 THER/PROPH/DIAG IV INF INIT: CPT

## 2024-01-29 PROCEDURE — 87636 SARSCOV2 & INF A&B AMP PRB: CPT | Performed by: EMERGENCY MEDICINE

## 2024-01-29 PROCEDURE — 71045 X-RAY EXAM CHEST 1 VIEW: CPT

## 2024-01-29 PROCEDURE — 25010000002 CEFTRIAXONE PER 250 MG: Performed by: EMERGENCY MEDICINE

## 2024-01-29 PROCEDURE — 99284 EMERGENCY DEPT VISIT MOD MDM: CPT

## 2024-01-29 RX ORDER — CEPHALEXIN 500 MG/1
500 CAPSULE ORAL 2 TIMES DAILY
Qty: 20 CAPSULE | Refills: 0 | Status: SHIPPED | OUTPATIENT
Start: 2024-01-29 | End: 2024-02-08

## 2024-01-29 RX ADMIN — CEFTRIAXONE 1000 MG: 1 INJECTION, POWDER, FOR SOLUTION INTRAMUSCULAR; INTRAVENOUS at 05:07

## 2024-01-29 RX ADMIN — SODIUM CHLORIDE 1000 ML: 9 INJECTION, SOLUTION INTRAVENOUS at 04:16

## 2024-01-29 NOTE — ED PROVIDER NOTES
Subjective   History of Present Illness  Pt sent to the EC from NH with report that she has had decreased urinary output to nephrostomy/stratton drainage for the past 24 hours.  Pt also noted to have fever in EC.  Pt unable to provide further hx.          Review of Systems   Reason unable to perform ROS: mentation.       Past Medical History:   Diagnosis Date    Acute kidney failure, unspecified     Angina at rest     Anoxic brain damage, not elsewhere classified     Chronic pulmonary embolism     Chronic respiratory failure, unspecified whether with hypoxia or hypercapnia     Dependence on respirator (ventilator) status     Gastrointestinal hemorrhage, unspecified     Hypercalcemia     Iron deficiency anemia     Metabolic encephalopathy     Migraine     Sees Pain Management for injections.     MVP (mitral valve prolapse)     Other dysphagia     Other pulmonary embolism with acute cor pulmonale     Renal disorder     Ruptured bladder     Syncope     Urinary tract infection, site not specified        Allergies   Allergen Reactions    Coconut Unknown - High Severity    Levaquin [Levofloxacin] Other (See Comments)     unknown    Nuts Unknown - High Severity    Penicillins     [place] Other (See Comments)     Causes migraines per pt reports       Past Surgical History:   Procedure Laterality Date    ABDOMINAL SURGERY      BREAST BIOPSY Right 2011    benign    GTUBE INSERTION      INSERTION HEMODIALYSIS CATHETER Left 09/16/2021    Procedure: HEMODIALYSIS CATHETER INSERTION;  Surgeon: Anders Kaur MD;  Location: [place] [address];  Service: Vascular;  Laterality: Left;    TONSILLECTOMY      TRACHEOSTOMY         Family History   Problem Relation Age of Onset    Colon cancer Maternal Aunt 52    Fibromyalgia Mother     Heart disease Mother     Hypertension Mother     Hodgkin's lymphoma Paternal Grandmother     Ovarian cancer Neg Hx     Breast cancer Neg Hx        Social History     Socioeconomic History    Marital  status:    Tobacco Use    Smoking status: Never    Smokeless tobacco: Never   Vaping Use    Vaping Use: Never used   Substance and Sexual Activity    Alcohol use: No    Drug use: No           Objective   Physical Exam  Vitals and nursing note reviewed.   Constitutional:       General: She is not in acute distress.  Cardiovascular:      Rate and Rhythm: Normal rate and regular rhythm.      Pulses: Normal pulses.      Heart sounds: Normal heart sounds.   Pulmonary:      Effort: Pulmonary effort is normal.      Breath sounds: Normal breath sounds.   Abdominal:      Comments: Large healed midline abdominal scar.  PEG tube present.  Mojica tubing noted with urine showing a large amount of sediment   Musculoskeletal:      Comments: Nephrostomy tube to L back - suture present but not connected to skin.  Tubing appears dirty.  Cloudy fluid in drainage tube with large amount of sediment.          Procedures           ED Course      Labs Reviewed   COMPREHENSIVE METABOLIC PANEL - Abnormal; Notable for the following components:       Result Value    Glucose 104 (*)     BUN 43 (*)     Creatinine 0.54 (*)     Sodium 152 (*)     Chloride 113 (*)     BUN/Creatinine Ratio 79.6 (*)     All other components within normal limits    Narrative:     GFR Normal >60  Chronic Kidney Disease <60  Kidney Failure <15     URINALYSIS W/ CULTURE IF INDICATED - Abnormal; Notable for the following components:    Appearance, UA Turbid (*)     pH, UA 8.5 (*)     Blood, UA Large (3+) (*)     Protein,  mg/dL (2+) (*)     Leuk Esterase, UA Large (3+) (*)     Nitrite, UA Positive (*)     All other components within normal limits    Narrative:     In absence of clinical symptoms, the presence of pyuria, bacteria, and/or nitrites on the urinalysis result does not correlate with infection.   MAGNESIUM - Abnormal; Notable for the following components:    Magnesium 2.7 (*)     All other components within normal limits   CBC WITH AUTO DIFFERENTIAL  - Abnormal; Notable for the following components:    .0 (*)     MCHC 31.4 (*)     RDW-SD 56.0 (*)     All other components within normal limits   URINALYSIS, MICROSCOPIC ONLY - Abnormal; Notable for the following components:    RBC, UA 21-50 (*)     WBC, UA Too Numerous to Count (*)     Bacteria, UA 3+ (*)     All other components within normal limits   COVID-19 AND FLU A/B, NP SWAB IN TRANSPORT MEDIA 1 HR TAT - Normal    Narrative:     Fact sheet for providers: https://www.fda.gov/media/994029/download    Fact sheet for patients: https://www.fda.gov/media/327265/download    Test performed by PCR.   LACTIC ACID, PLASMA - Normal   COVID PRE-OP / PRE-PROCEDURE SCREENING ORDER (NO ISOLATION)    Narrative:     The following orders were created for panel order COVID PRE-OP / PRE-PROCEDURE SCREENING ORDER (NO ISOLATION) - Swab, Nasal Cavity.  Procedure                               Abnormality         Status                     ---------                               -----------         ------                     COVID-19 and FLU A/B PCR...[[id number]]  Normal              Final result                 Please view results for these tests on the individual orders.   BLOOD CULTURE   BLOOD CULTURE   URINE CULTURE   CBC AND DIFFERENTIAL    Narrative:     The following orders were created for panel order CBC & Differential.  Procedure                               Abnormality         Status                     ---------                               -----------         ------                     CBC Auto Differential[[id number]]        Abnormal            Final result                 Please view results for these tests on the individual orders.     CT Abdomen Pelvis Without Contrast   Final Result   1. Bilateral nonobstructing nephrolithiasis.   2. Left-sided percutaneous nephrostomy catheter in place. No change in   position. Left-sided ureteral stent in place. No change.   3. Urinary bladder is decompressed with a Mojica  catheter and not   optimally visualized or evaluated. Urinary bladder calculi persist.   4. Bilateral pyeloureteritis similar to the previous study.   5. Gastrostomy tube in place.   6. Significant large volume stool in the colon without finding to   suggest obstruction.   7. Subcutaneous fat infiltration in the right gluteal area and the right   lower abdominal wall similar to the previous study. This may represent   acute or subacute inflammatory process/cellulitis?.       This report was signed and finalized on 1/29/2024 6:04 AM by Dr. Lui Bush MD.          XR Chest 1 View    (Results Pending)                                              Medical Decision Making  Pt stable in EC - NAD att.  CT unchanged from prior. Both stratton and nephrostomy draw/flow.  She does have evid of UTI which is chronic for her - most recent culture was sensitive to cephalosporins so was given 1g IV rocephin and will d/c with keflex.  No findings of sepsis.  No displacement of nephrostomy/stratton.  Feel pt okay to return to NH att.      Amount and/or Complexity of Data Reviewed  Labs: ordered.  Radiology: ordered.        Final diagnoses:   Acute UTI (urinary tract infection)       ED Disposition  ED Disposition       ED Disposition   Discharge    Condition   Stable    Comment   --               David Lara MD  [address] [place] [address] [address] [place] [id number] [phone]               Medication List        New Prescriptions      cephalexin 500 MG capsule  Commonly known as: KEFLEX  Take 1 capsule by mouth 2 (Two) Times a Day for 10 days.               Where to Get Your Medications        [facility] [place] [facility] - [phone] [facility] [phone] [address] [address]      Phone: [phone]   cephalexin 500 MG capsule            Jean-Claude Golden, DO  01/29/24 0320       Jean-Claude Golden, DO  01/29/24 0682

## 2024-02-01 LAB
BACTERIA SPEC AEROBE CULT: ABNORMAL

## 2024-02-03 LAB
BACTERIA SPEC AEROBE CULT: NORMAL
BACTERIA SPEC AEROBE CULT: NORMAL

## 2024-02-07 ENCOUNTER — APPOINTMENT (OUTPATIENT)
Dept: CT IMAGING | Facility: HOSPITAL | Age: 49
DRG: 698 | End: 2024-02-07
Payer: MEDICARE

## 2024-02-07 ENCOUNTER — APPOINTMENT (OUTPATIENT)
Dept: GENERAL RADIOLOGY | Facility: HOSPITAL | Age: 49
DRG: 698 | End: 2024-02-07
Payer: MEDICARE

## 2024-02-07 ENCOUNTER — HOSPITAL ENCOUNTER (INPATIENT)
Facility: HOSPITAL | Age: 49
LOS: 12 days | Discharge: NURSING, REHAB OR LONG-TERM CARE | DRG: 698 | End: 2024-02-19
Attending: EMERGENCY MEDICINE | Admitting: HOSPITALIST
Payer: MEDICARE

## 2024-02-07 DIAGNOSIS — Z86.69 HISTORY OF ANOXIC BRAIN INJURY: Chronic | ICD-10-CM

## 2024-02-07 DIAGNOSIS — N39.0 ACUTE UTI: ICD-10-CM

## 2024-02-07 DIAGNOSIS — R65.20 SEVERE SEPSIS: ICD-10-CM

## 2024-02-07 DIAGNOSIS — R50.9 FEVER, UNSPECIFIED FEVER CAUSE: Primary | ICD-10-CM

## 2024-02-07 DIAGNOSIS — A41.9 SEVERE SEPSIS: ICD-10-CM

## 2024-02-07 DIAGNOSIS — A41.9 SEPSIS WITHOUT ACUTE ORGAN DYSFUNCTION, DUE TO UNSPECIFIED ORGANISM: ICD-10-CM

## 2024-02-07 DIAGNOSIS — N94.89 PELVIC HEMATOMA, FEMALE: ICD-10-CM

## 2024-02-07 DIAGNOSIS — E87.0 HYPERNATREMIA: ICD-10-CM

## 2024-02-07 LAB
ALBUMIN SERPL-MCNC: 4.9 G/DL (ref 3.5–5.2)
ALBUMIN/GLOB SERPL: 1.3 G/DL
ALP SERPL-CCNC: 62 U/L (ref 39–117)
ALT SERPL W P-5'-P-CCNC: 13 U/L (ref 1–33)
ANION GAP SERPL CALCULATED.3IONS-SCNC: 18 MMOL/L (ref 5–15)
ANION GAP SERPL CALCULATED.3IONS-SCNC: 19 MMOL/L (ref 5–15)
APTT PPP: 28 SECONDS (ref 24.5–36)
AST SERPL-CCNC: 20 U/L (ref 1–32)
BACTERIA UR QL AUTO: ABNORMAL /HPF
BASOPHILS # BLD AUTO: 0.07 10*3/MM3 (ref 0–0.2)
BASOPHILS NFR BLD AUTO: 0.5 % (ref 0–1.5)
BILIRUB SERPL-MCNC: 0.9 MG/DL (ref 0–1.2)
BILIRUB UR QL STRIP: NEGATIVE
BUN SERPL-MCNC: 56 MG/DL (ref 6–20)
BUN SERPL-MCNC: 57 MG/DL (ref 6–20)
BUN/CREAT SERPL: 57.7 (ref 7–25)
BUN/CREAT SERPL: 59.4 (ref 7–25)
CALCIUM SPEC-SCNC: 10.7 MG/DL (ref 8.6–10.5)
CALCIUM SPEC-SCNC: 11.4 MG/DL (ref 8.6–10.5)
CHLORIDE SERPL-SCNC: 115 MMOL/L (ref 98–107)
CHLORIDE SERPL-SCNC: 119 MMOL/L (ref 98–107)
CLARITY UR: ABNORMAL
CO2 SERPL-SCNC: 25 MMOL/L (ref 22–29)
CO2 SERPL-SCNC: 27 MMOL/L (ref 22–29)
COLOR UR: ABNORMAL
CREAT SERPL-MCNC: 0.96 MG/DL (ref 0.57–1)
CREAT SERPL-MCNC: 0.97 MG/DL (ref 0.57–1)
CRP SERPL-MCNC: <0.3 MG/DL (ref 0–0.5)
D-LACTATE SERPL-SCNC: 2 MMOL/L (ref 0.5–2)
D-LACTATE SERPL-SCNC: 2.4 MMOL/L (ref 0.5–2)
DEPRECATED RDW RBC AUTO: 56.6 FL (ref 37–54)
EGFRCR SERPLBLD CKD-EPI 2021: 73.1 ML/MIN/1.73
EGFRCR SERPLBLD CKD-EPI 2021: 74 ML/MIN/1.73
EOSINOPHIL # BLD AUTO: 0 10*3/MM3 (ref 0–0.4)
EOSINOPHIL NFR BLD AUTO: 0 % (ref 0.3–6.2)
ERYTHROCYTE [DISTWIDTH] IN BLOOD BY AUTOMATED COUNT: 15.4 % (ref 12.3–15.4)
ERYTHROCYTE [SEDIMENTATION RATE] IN BLOOD: 108 MM/HR (ref 0–20)
FLUAV RNA RESP QL NAA+PROBE: NOT DETECTED
FLUBV RNA RESP QL NAA+PROBE: NOT DETECTED
GLOBULIN UR ELPH-MCNC: 3.9 GM/DL
GLUCOSE SERPL-MCNC: 161 MG/DL (ref 65–99)
GLUCOSE SERPL-MCNC: 171 MG/DL (ref 65–99)
GLUCOSE UR STRIP-MCNC: NEGATIVE MG/DL
HCT VFR BLD AUTO: 42.6 % (ref 34–46.6)
HGB BLD-MCNC: 13.3 G/DL (ref 12–15.9)
HGB UR QL STRIP.AUTO: ABNORMAL
HYALINE CASTS UR QL AUTO: ABNORMAL /LPF
IMM GRANULOCYTES # BLD AUTO: 0.04 10*3/MM3 (ref 0–0.05)
IMM GRANULOCYTES NFR BLD AUTO: 0.3 % (ref 0–0.5)
INR PPP: 1.1 (ref 0.91–1.09)
KETONES UR QL STRIP: ABNORMAL
LEUKOCYTE ESTERASE UR QL STRIP.AUTO: ABNORMAL
LYMPHOCYTES # BLD AUTO: 1.14 10*3/MM3 (ref 0.7–3.1)
LYMPHOCYTES NFR BLD AUTO: 7.5 % (ref 19.6–45.3)
MAGNESIUM SERPL-MCNC: 2.7 MG/DL (ref 1.6–2.6)
MCH RBC QN AUTO: 31.4 PG (ref 26.6–33)
MCHC RBC AUTO-ENTMCNC: 31.2 G/DL (ref 31.5–35.7)
MCV RBC AUTO: 100.7 FL (ref 79–97)
MONOCYTES # BLD AUTO: 0.85 10*3/MM3 (ref 0.1–0.9)
MONOCYTES NFR BLD AUTO: 5.6 % (ref 5–12)
NEUTROPHILS NFR BLD AUTO: 13.04 10*3/MM3 (ref 1.7–7)
NEUTROPHILS NFR BLD AUTO: 86.1 % (ref 42.7–76)
NITRITE UR QL STRIP: NEGATIVE
NRBC BLD AUTO-RTO: 0 /100 WBC (ref 0–0.2)
NT-PROBNP SERPL-MCNC: 1058 PG/ML (ref 0–450)
PH UR STRIP.AUTO: 7 [PH] (ref 5–8)
PHOSPHATE SERPL-MCNC: 3 MG/DL (ref 2.5–4.5)
PLATELET # BLD AUTO: 312 10*3/MM3 (ref 140–450)
PMV BLD AUTO: 11.8 FL (ref 6–12)
POTASSIUM SERPL-SCNC: 3.6 MMOL/L (ref 3.5–5.2)
POTASSIUM SERPL-SCNC: 4 MMOL/L (ref 3.5–5.2)
PROCALCITONIN SERPL-MCNC: 0.42 NG/ML (ref 0–0.25)
PROT SERPL-MCNC: 8.8 G/DL (ref 6–8.5)
PROT UR QL STRIP: ABNORMAL
PROTHROMBIN TIME: 14.3 SECONDS (ref 11.8–14.8)
RBC # BLD AUTO: 4.23 10*6/MM3 (ref 3.77–5.28)
RBC # UR STRIP: ABNORMAL /HPF
REF LAB TEST METHOD: ABNORMAL
RSV RNA RESP QL NAA+PROBE: NOT DETECTED
SARS-COV-2 RNA RESP QL NAA+PROBE: NOT DETECTED
SODIUM SERPL-SCNC: 161 MMOL/L (ref 136–145)
SODIUM SERPL-SCNC: 162 MMOL/L (ref 136–145)
SP GR UR STRIP: 1.02 (ref 1–1.03)
SQUAMOUS #/AREA URNS HPF: ABNORMAL /HPF
UROBILINOGEN UR QL STRIP: ABNORMAL
WBC # UR STRIP: ABNORMAL /HPF
WBC NRBC COR # BLD AUTO: 15.14 10*3/MM3 (ref 3.4–10.8)

## 2024-02-07 PROCEDURE — 83880 ASSAY OF NATRIURETIC PEPTIDE: CPT | Performed by: EMERGENCY MEDICINE

## 2024-02-07 PROCEDURE — 86140 C-REACTIVE PROTEIN: CPT | Performed by: EMERGENCY MEDICINE

## 2024-02-07 PROCEDURE — 25010000002 MEROPENEM PER 100 MG: Performed by: EMERGENCY MEDICINE

## 2024-02-07 PROCEDURE — 87040 BLOOD CULTURE FOR BACTERIA: CPT | Performed by: EMERGENCY MEDICINE

## 2024-02-07 PROCEDURE — 85025 COMPLETE CBC W/AUTO DIFF WBC: CPT | Performed by: EMERGENCY MEDICINE

## 2024-02-07 PROCEDURE — 85610 PROTHROMBIN TIME: CPT | Performed by: EMERGENCY MEDICINE

## 2024-02-07 PROCEDURE — 85652 RBC SED RATE AUTOMATED: CPT | Performed by: EMERGENCY MEDICINE

## 2024-02-07 PROCEDURE — 83735 ASSAY OF MAGNESIUM: CPT | Performed by: FAMILY MEDICINE

## 2024-02-07 PROCEDURE — 25010000002 VANCOMYCIN 1 G RECONSTITUTED SOLUTION 1 EACH VIAL: Performed by: EMERGENCY MEDICINE

## 2024-02-07 PROCEDURE — 83605 ASSAY OF LACTIC ACID: CPT | Performed by: EMERGENCY MEDICINE

## 2024-02-07 PROCEDURE — 25010000002 MORPHINE PER 10 MG: Performed by: FAMILY MEDICINE

## 2024-02-07 PROCEDURE — 93005 ELECTROCARDIOGRAM TRACING: CPT | Performed by: EMERGENCY MEDICINE

## 2024-02-07 PROCEDURE — 84100 ASSAY OF PHOSPHORUS: CPT | Performed by: FAMILY MEDICINE

## 2024-02-07 PROCEDURE — 99285 EMERGENCY DEPT VISIT HI MDM: CPT

## 2024-02-07 PROCEDURE — 0 DEXTROSE 5 % SOLUTION: Performed by: FAMILY MEDICINE

## 2024-02-07 PROCEDURE — 87637 SARSCOV2&INF A&B&RSV AMP PRB: CPT | Performed by: EMERGENCY MEDICINE

## 2024-02-07 PROCEDURE — 93010 ELECTROCARDIOGRAM REPORT: CPT | Performed by: INTERNAL MEDICINE

## 2024-02-07 PROCEDURE — 85730 THROMBOPLASTIN TIME PARTIAL: CPT | Performed by: EMERGENCY MEDICINE

## 2024-02-07 PROCEDURE — 87086 URINE CULTURE/COLONY COUNT: CPT | Performed by: EMERGENCY MEDICINE

## 2024-02-07 PROCEDURE — 74176 CT ABD & PELVIS W/O CONTRAST: CPT

## 2024-02-07 PROCEDURE — 87147 CULTURE TYPE IMMUNOLOGIC: CPT | Performed by: EMERGENCY MEDICINE

## 2024-02-07 PROCEDURE — 25010000002 AZTREONAM PER 500 MG: Performed by: EMERGENCY MEDICINE

## 2024-02-07 PROCEDURE — 25810000003 SODIUM CHLORIDE 0.9 % SOLUTION 250 ML FLEX CONT: Performed by: EMERGENCY MEDICINE

## 2024-02-07 PROCEDURE — 36415 COLL VENOUS BLD VENIPUNCTURE: CPT

## 2024-02-07 PROCEDURE — 81001 URINALYSIS AUTO W/SCOPE: CPT | Performed by: EMERGENCY MEDICINE

## 2024-02-07 PROCEDURE — 80053 COMPREHEN METABOLIC PANEL: CPT | Performed by: EMERGENCY MEDICINE

## 2024-02-07 PROCEDURE — 25010000002 MEROPENEM PER 100 MG: Performed by: FAMILY MEDICINE

## 2024-02-07 PROCEDURE — 84145 PROCALCITONIN (PCT): CPT | Performed by: EMERGENCY MEDICINE

## 2024-02-07 PROCEDURE — 71045 X-RAY EXAM CHEST 1 VIEW: CPT

## 2024-02-07 PROCEDURE — P9612 CATHETERIZE FOR URINE SPEC: HCPCS

## 2024-02-07 PROCEDURE — 94799 UNLISTED PULMONARY SVC/PX: CPT

## 2024-02-07 PROCEDURE — 87154 CUL TYP ID BLD PTHGN 6+ TRGT: CPT | Performed by: EMERGENCY MEDICINE

## 2024-02-07 PROCEDURE — 99223 1ST HOSP IP/OBS HIGH 75: CPT | Performed by: INTERNAL MEDICINE

## 2024-02-07 PROCEDURE — 25010000002 HEPARIN (PORCINE) PER 1000 UNITS: Performed by: FAMILY MEDICINE

## 2024-02-07 RX ORDER — NYSTATIN 100000 [USP'U]/G
1 POWDER TOPICAL 2 TIMES DAILY
COMMUNITY

## 2024-02-07 RX ORDER — ACETAMINOPHEN 650 MG/1
650 SUPPOSITORY RECTAL ONCE
Status: COMPLETED | OUTPATIENT
Start: 2024-02-07 | End: 2024-02-07

## 2024-02-07 RX ORDER — CITRIC ACID, GLUCONOLACTONE AND MAGNESIUM CARBONATE 6.602; .198; 3.268 G/100ML; G/100ML; G/100ML
60 SOLUTION IRRIGATION 2 TIMES DAILY
COMMUNITY

## 2024-02-07 RX ORDER — MORPHINE SULFATE 2 MG/ML
1 INJECTION, SOLUTION INTRAMUSCULAR; INTRAVENOUS EVERY 4 HOURS PRN
Status: DISCONTINUED | OUTPATIENT
Start: 2024-02-07 | End: 2024-02-15

## 2024-02-07 RX ORDER — ALBUTEROL SULFATE 2.5 MG/3ML
2.5 SOLUTION RESPIRATORY (INHALATION) EVERY 4 HOURS PRN
Status: DISCONTINUED | OUTPATIENT
Start: 2024-02-07 | End: 2024-02-19 | Stop reason: HOSPADM

## 2024-02-07 RX ORDER — SODIUM CHLORIDE 0.9 % (FLUSH) 0.9 %
10 SYRINGE (ML) INJECTION AS NEEDED
Status: DISCONTINUED | OUTPATIENT
Start: 2024-02-07 | End: 2024-02-19 | Stop reason: HOSPADM

## 2024-02-07 RX ORDER — SODIUM CHLORIDE 9 MG/ML
40 INJECTION, SOLUTION INTRAVENOUS AS NEEDED
Status: DISCONTINUED | OUTPATIENT
Start: 2024-02-07 | End: 2024-02-19 | Stop reason: HOSPADM

## 2024-02-07 RX ORDER — HYDROXYZINE HYDROCHLORIDE 25 MG/1
25 TABLET, FILM COATED ORAL EVERY 6 HOURS PRN
Status: DISCONTINUED | OUTPATIENT
Start: 2024-02-07 | End: 2024-02-19 | Stop reason: HOSPADM

## 2024-02-07 RX ORDER — BISACODYL 5 MG/1
5 TABLET, DELAYED RELEASE ORAL DAILY PRN
Status: DISCONTINUED | OUTPATIENT
Start: 2024-02-07 | End: 2024-02-19 | Stop reason: HOSPADM

## 2024-02-07 RX ORDER — METOPROLOL TARTRATE 50 MG/1
50 TABLET, FILM COATED ORAL 2 TIMES DAILY
COMMUNITY
End: 2024-02-19 | Stop reason: HOSPADM

## 2024-02-07 RX ORDER — POLYETHYLENE GLYCOL 3350 17 G/17G
17 POWDER, FOR SOLUTION ORAL DAILY PRN
Status: DISCONTINUED | OUTPATIENT
Start: 2024-02-07 | End: 2024-02-19 | Stop reason: HOSPADM

## 2024-02-07 RX ORDER — ACETAMINOPHEN 650 MG/1
325 SUPPOSITORY RECTAL EVERY 4 HOURS PRN
Status: DISCONTINUED | OUTPATIENT
Start: 2024-02-07 | End: 2024-02-19 | Stop reason: HOSPADM

## 2024-02-07 RX ORDER — BISACODYL 10 MG
10 SUPPOSITORY, RECTAL RECTAL
Status: DISCONTINUED | OUTPATIENT
Start: 2024-02-07 | End: 2024-02-07 | Stop reason: SDUPTHER

## 2024-02-07 RX ORDER — CEPHALEXIN 500 MG/1
500 CAPSULE ORAL 2 TIMES DAILY
COMMUNITY
Start: 2024-01-30 | End: 2024-02-19 | Stop reason: HOSPADM

## 2024-02-07 RX ORDER — BACLOFEN 10 MG/1
10 TABLET ORAL EVERY 6 HOURS
Status: DISCONTINUED | OUTPATIENT
Start: 2024-02-07 | End: 2024-02-19 | Stop reason: HOSPADM

## 2024-02-07 RX ORDER — SACCHAROMYCES BOULARDII 250 MG
250 CAPSULE ORAL DAILY
Status: DISCONTINUED | OUTPATIENT
Start: 2024-02-08 | End: 2024-02-19 | Stop reason: HOSPADM

## 2024-02-07 RX ORDER — GUAIFENESIN 200 MG/10ML
200 LIQUID ORAL 2 TIMES DAILY
COMMUNITY

## 2024-02-07 RX ORDER — ONDANSETRON 2 MG/ML
4 INJECTION INTRAMUSCULAR; INTRAVENOUS EVERY 6 HOURS PRN
Status: DISCONTINUED | OUTPATIENT
Start: 2024-02-07 | End: 2024-02-19 | Stop reason: HOSPADM

## 2024-02-07 RX ORDER — SCOLOPAMINE TRANSDERMAL SYSTEM 1 MG/1
1 PATCH, EXTENDED RELEASE TRANSDERMAL
COMMUNITY

## 2024-02-07 RX ORDER — SODIUM CHLORIDE 0.9 % (FLUSH) 0.9 %
10 SYRINGE (ML) INJECTION EVERY 12 HOURS SCHEDULED
Status: DISCONTINUED | OUTPATIENT
Start: 2024-02-07 | End: 2024-02-19 | Stop reason: HOSPADM

## 2024-02-07 RX ORDER — AMOXICILLIN 250 MG
2 CAPSULE ORAL 2 TIMES DAILY PRN
Status: DISCONTINUED | OUTPATIENT
Start: 2024-02-07 | End: 2024-02-19 | Stop reason: HOSPADM

## 2024-02-07 RX ORDER — ACETAMINOPHEN 650 MG/1
650 SUPPOSITORY RECTAL ONCE
Status: COMPLETED | OUTPATIENT
Start: 2024-02-08 | End: 2024-02-07

## 2024-02-07 RX ORDER — FAMOTIDINE 10 MG/ML
20 INJECTION, SOLUTION INTRAVENOUS EVERY 12 HOURS SCHEDULED
Status: DISCONTINUED | OUTPATIENT
Start: 2024-02-07 | End: 2024-02-19 | Stop reason: HOSPADM

## 2024-02-07 RX ORDER — DEXTROSE MONOHYDRATE 50 MG/ML
125 INJECTION, SOLUTION INTRAVENOUS CONTINUOUS
Status: DISCONTINUED | OUTPATIENT
Start: 2024-02-07 | End: 2024-02-08

## 2024-02-07 RX ORDER — TIZANIDINE 4 MG/1
8 TABLET ORAL EVERY 8 HOURS PRN
COMMUNITY

## 2024-02-07 RX ORDER — HEPARIN SODIUM 5000 [USP'U]/ML
5000 INJECTION, SOLUTION INTRAVENOUS; SUBCUTANEOUS EVERY 12 HOURS SCHEDULED
Status: DISCONTINUED | OUTPATIENT
Start: 2024-02-07 | End: 2024-02-19 | Stop reason: HOSPADM

## 2024-02-07 RX ORDER — BISACODYL 10 MG
10 SUPPOSITORY, RECTAL RECTAL DAILY PRN
Status: DISCONTINUED | OUTPATIENT
Start: 2024-02-07 | End: 2024-02-19 | Stop reason: HOSPADM

## 2024-02-07 RX ORDER — AMOXICILLIN 250 MG
1 CAPSULE ORAL 2 TIMES DAILY
COMMUNITY

## 2024-02-07 RX ORDER — MULTIPLE VITAMINS W/ MINERALS TAB 9MG-400MCG
1 TAB ORAL DAILY
Status: DISCONTINUED | OUTPATIENT
Start: 2024-02-08 | End: 2024-02-19 | Stop reason: HOSPADM

## 2024-02-07 RX ORDER — PANTOPRAZOLE SODIUM 40 MG/1
40 TABLET, DELAYED RELEASE ORAL 2 TIMES DAILY
COMMUNITY

## 2024-02-07 RX ADMIN — MORPHINE SULFATE 1 MG: 2 INJECTION, SOLUTION INTRAMUSCULAR; INTRAVENOUS at 16:52

## 2024-02-07 RX ADMIN — VANCOMYCIN HYDROCHLORIDE 1000 MG: 1 INJECTION, POWDER, LYOPHILIZED, FOR SOLUTION INTRAVENOUS at 14:56

## 2024-02-07 RX ADMIN — Medication 10 ML: at 21:14

## 2024-02-07 RX ADMIN — ACETAMINOPHEN 325 MG: 650 SUPPOSITORY RECTAL at 22:31

## 2024-02-07 RX ADMIN — MEROPENEM 1000 MG: 1 INJECTION, POWDER, FOR SOLUTION INTRAVENOUS at 16:06

## 2024-02-07 RX ADMIN — MORPHINE SULFATE 1 MG: 2 INJECTION, SOLUTION INTRAMUSCULAR; INTRAVENOUS at 22:47

## 2024-02-07 RX ADMIN — HEPARIN SODIUM 5000 UNITS: 5000 INJECTION INTRAVENOUS; SUBCUTANEOUS at 21:09

## 2024-02-07 RX ADMIN — ACETAMINOPHEN 650 MG: 650 SUPPOSITORY RECTAL at 12:44

## 2024-02-07 RX ADMIN — DEXTROSE MONOHYDRATE 125 ML/HR: 50 INJECTION, SOLUTION INTRAVENOUS at 16:44

## 2024-02-07 RX ADMIN — ACETAMINOPHEN 650 MG: 650 SUPPOSITORY RECTAL at 23:20

## 2024-02-07 RX ADMIN — AZTREONAM 2 G: 2 INJECTION, POWDER, LYOPHILIZED, FOR SOLUTION INTRAMUSCULAR; INTRAVENOUS at 14:22

## 2024-02-07 RX ADMIN — FAMOTIDINE 20 MG: 10 INJECTION INTRAVENOUS at 16:05

## 2024-02-07 RX ADMIN — MEROPENEM 1000 MG: 1 INJECTION, POWDER, FOR SOLUTION INTRAVENOUS at 21:09

## 2024-02-07 NOTE — H&P
[facility] Medicine Services  HISTORY AND PHYSICAL    Date of Admission: 2/7/2024  Primary Care Physician: David Lara MD    Subjective   Primary Historian:  and records    Chief Complaint: Fevers    History of Present Illness  48-year-old female with history of anoxic brain injury, spastic quadriplegia, percutaneous endoscopic gastrostomy tube placement, severe malnutrition, frequent urinary tract infections with secondary sepsis, was admitted to the hospital December 2023 and treated with broad-spectrum antibiotics; history of mitral valve prolapse, nephrolithiasis, with a left percutaneous nephroureteral drain in place, last exchanged documented on January 2023.    Patient has a complex medical history.  She had COVID-19 in August 2021, admitted to the hospital, complicated with pulmonary embolism and developed abdominal wall hematoma.  The patient was admitted in April 2022, with acute encephalopathy, had multiple studies performed at the time.  She developed an anoxic brain injury of unknown etiology.  She was then transferred to higher level of care.  Apparently the patient was then transition to a nursing home.    Today, she is brought from nursing home [facility], and as per family member  with whom I spoke over the phone, the patient has had a malfunctioning percutaneous gastrostomy tube for several days, and approximately 3 days ago developed fevers.  There is no other information available at this time.  Not able to provide any history due to neurological condition.        Review of Systems   Otherwise complete ROS reviewed and negative except as mentioned in the HPI.    Past Medical History:   Past Medical History:   Diagnosis Date    Acute kidney failure, unspecified     Angina at rest     Anoxic brain damage, not elsewhere classified     Chronic pulmonary embolism     Chronic respiratory failure, unspecified whether with hypoxia or  hypercapnia     Dependence on respirator (ventilator) status     Gastrointestinal hemorrhage, unspecified     Hypercalcemia     Iron deficiency anemia     Metabolic encephalopathy     Migraine     Sees Pain Management for injections.     MVP (mitral valve prolapse)     Other dysphagia     Other pulmonary embolism with acute cor pulmonale     Renal disorder     Ruptured bladder     Syncope     Urinary tract infection, site not specified      Past Surgical History:  Past Surgical History:   Procedure Laterality Date    ABDOMINAL SURGERY      BREAST BIOPSY Right 2011    benign    GTUBE INSERTION      INSERTION HEMODIALYSIS CATHETER Left 09/16/2021    Procedure: HEMODIALYSIS CATHETER INSERTION;  Surgeon: Anders Kaur MD;  Location: [place] [address];  Service: Vascular;  Laterality: Left;    TONSILLECTOMY      TRACHEOSTOMY       Social History:  reports that she has never smoked. She has never used smokeless tobacco. She reports that she does not drink alcohol and does not use drugs.    Family History: family history includes Colon cancer (age of onset: 52) in her maternal aunt; Fibromyalgia in her mother; Heart disease in her mother; Hodgkin's lymphoma in her paternal grandmother; Hypertension in her mother.   Reviewed    Allergies:  Allergies   Allergen Reactions    Coconut Unknown - High Severity    Levaquin [Levofloxacin] Other (See Comments)     unknown    Nuts Unknown - High Severity    Penicillins     [place] Other (See Comments)     Causes migraines per pt reports       Medications:  Prior to Admission medications    Medication Sig Start Date End Date Taking? Authorizing Provider   acetaminophen (TYLENOL) 500 MG tablet Administer 1 tablet per G tube Every 4 (Four) Hours As Needed for Fever. Not to exceed 3000mg from all sources daily    Provider, MD Odalys   albuterol (PROVENTIL) (2.5 MG/3ML) 0.083% nebulizer solution Take 2.5 mg by nebulization Every 4 (Four) Hours As Needed for Wheezing.     Odalys Mullen MD   baclofen (LIORESAL) 20 MG tablet Administer 1 tablet per G tube Every 6 (Six) Hours.    Odalys Mullen MD   bisacodyl (DULCOLAX) 10 MG suppository Insert 1 suppository into the rectum Every Other Day As Needed for Constipation.    Odalys Mullen MD   Carboxymethylcellul-Glycerin (Refresh Optive) 0.5-0.9 % solution Apply 1 drop to eye(s) as directed by provider 2 (Two) Times a Day. Bilateral eyes    Odalys Mullen MD   cephalexin (KEFLEX) 500 MG capsule Take 1 capsule by mouth 2 (Two) Times a Day for 10 days. 1/29/24 2/8/24  Jean-Claude Golden DO   chlorhexidine (PERIDEX) 0.12 % solution Apply 15 mL to the mouth or throat 2 (Two) Times a Day. 15 ml using oral swab twice daily for oral care    Odalys Mullen MD   Docusate Sodium 150 MG/15ML syrup Take 20 mL by mouth 2 (Two) Times a Day.    Odalys Mullen MD   Enoxaparin Sodium (LOVENOX) 40 MG/0.4ML solution prefilled syringe syringe Inject 0.4 mL under the skin into the appropriate area as directed Every Morning.    Odalys Mullen MD   Eyelid Cleansers (OCUSOFT LID SCRUB EX) Administer 1 Application to both eyes Daily.    Odalys Mullen MD   ferrous sulfate 220 (44 Fe) MG/5ML liquid liquid Administer 5 mL per G tube Every Morning.    Odalys Mullen MD   HYDROcodone-acetaminophen (NORCO) 5-325 MG per tablet Take 1 tablet by mouth Every 6 (Six) Hours As Needed for Moderate Pain. 12/4/23   Amado Villarreal MD   hydrOXYzine (ATARAX) 25 MG tablet Administer 1 tablet per G tube Every 6 (Six) Hours As Needed for Itching. 12/4/23   Amado Villarreal MD   ipratropium-albuterol (DUO-NEB) 0.5-2.5 mg/3 ml nebulizer Take 3 mL by nebulization Every 4 (Four) Hours As Needed for Shortness of Air. 12/4/23   Amado Villarreal MD   lansoprazole (PREVACID SOLUTAB) 15 MG Tablet Delayed Release Dispersible disintegrating tablet Administer 1 tablet per G tube Every Morning. 12/5/23   Amado Villarreal MD    liver oil-zinc oxide (DESITIN) 40 % ointment Apply 1 application  topically to the appropriate area as directed Every 4 (Four) Hours As Needed for Irritation. 12/4/23   Amado Villarreal MD   multivitamin with minerals tablet tablet Take 1 tablet by mouth Daily. 12/4/23   Amado Villarreal MD   naloxone (NARCAN) 4 MG/0.1ML nasal spray Call 911. Don't prime. [place] in 1 nostril for overdose. Repeat in 2-3 minutes in other nostril if no or minimal breathing/responsiveness. 12/4/23   Amado Villarreal MD   ondansetron (ZOFRAN) 4 MG/5ML solution Take 5 mL by mouth Every 6 (Six) Hours As Needed for Nausea or Vomiting.    Odalys Mullen MD   polyethylene glycol (MIRALAX) 17 GM/SCOOP powder Administer 17 g per G tube Every Morning.    Odalys Mullen MD   rosuvastatin (CRESTOR) 5 MG tablet Administer 1 tablet per G tube Daily.    Odalys Mullen MD   saccharomyces boulardii (FLORASTOR) 250 MG capsule Administer 1 capsule per G tube Every Morning.    Odalys Mullen MD   simethicone (MYLICON) 40 MG/0.6ML drops Administer 0.3 mL per G tube Every 6 (Six) Hours.    Odalys Mullen MD   sodium chloride (NS) 0.9 % irrigation Irrigate with 10 mL to the affected area as directed by provider Every 8 (Eight) Hours. Irrigation for secretions: Hydration    Odalys Mullen MD   sodium chloride 3 % nebulizer solution Take 4 mL by nebulization Every 6 (Six) Hours As Needed for Cough (cough and sputum).    Odalys Mullen MD   Sodium Phosphates (fleet enema) 7-19 GM/118ML enema Insert 1 enema into the rectum Every Other Day As Needed (bowel management).    Odalys Mullen MD   thiamine (VITAMIN B-1) 100 MG tablet  tablet Administer 1 tablet per G tube Every Morning.    Odalys Muleln MD     I have utilized all available immediate resources to obtain, update, or review the patient's current medications (including all prescriptions, over-the-counter products, herbals, cannabis/cannabidiol  "products, and vitamin/mineral/dietary (nutritional) supplements).    Objective     Vital Signs: /69   Pulse (!) 129   Temp (!) 101.2 °F (38.4 °C) (Axillary)   Resp 18   Ht 152.4 cm (60\")   Wt 52.4 kg (115 lb 8 oz)   LMP  (LMP Unknown)   SpO2 96%   BMI 22.56 kg/m²   Physical Exam   Constitutional:       Appearance: Chronically ill-appearing.  Multiple contractures.  HENT:      Head: Normocephalic and atraumatic.      Nose: Nose normal.      Mouth/Throat:      Mouth: Mucous membranes are dry.     Pharynx: Able to evaluate  Eyes:      Extraocular Movements: Extraocular movements preserved     Conjunctiva/sclera: Conjunctivae normal.      Pupils: Pupils are equal, round, and reactive to light.   Cardiovascular:      Rate and Rhythm: Tachycardic, normal rate and regular rhythm.      Pulses: Fast pulse  Pulmonary:      Effort: Mild tachypnea.     Breath sounds: Reduced breath sounds on the right due to positioning.  Rales in bases.  Abdominal:      General: Abdominal scar extensive, with no open wounds there is a percutaneous gastrostomy tube in place.  Abdomen is nondistended.  Bowel sounds are diminished.     Palpations: Abdomen is soft.      Tenderness: There is no guarding or rebound.   Musculoskeletal:         General: Multiple upper and lower extremity flexion contractures   decreased range of motion.    Extremities: Again, contractures as per musculoskeletal exam; no lower extremity edema.  Skin:     Capillary Refill: Capillary refill takes less than 2 seconds.      Coloration: Skin is not jaundiced.      Findings: No rash.   Neurological:      General: Spastic quadriplegia.  I am not able to assess patient's orientation or memory.  Speech:  with aphasia.   Psychiatric evaluation not able to be performed.        Results Reviewed:  Lab Results (last 24 hours)       Procedure Component Value Units Date/Time    Basic Metabolic Panel [[id number]]  (Abnormal) Collected: 02/07/24 5633    Specimen: Blood " Updated: 02/07/24 1820     Glucose 171 mg/dL      BUN 57 mg/dL      Creatinine 0.96 mg/dL      Sodium 162 mmol/L      Potassium 3.6 mmol/L      Chloride 119 mmol/L      CO2 25.0 mmol/L      Calcium 10.7 mg/dL      BUN/Creatinine Ratio 59.4     Anion Gap 18.0 mmol/L      eGFR 74.0 mL/min/1.73     Narrative:      GFR Normal >60  Chronic Kidney Disease <60  Kidney Failure <15      Magnesium [[id number]]  (Abnormal) Collected: 02/07/24 1743    Specimen: Blood Updated: 02/07/24 1820     Magnesium 2.7 mg/dL     Miscellaneous Micro Request - Specimen Not Sent, Specimen Not Sent [[id number]] Updated: 02/07/24 1818    Specimen: Specimen Not Sent     STAT Lactic Acid, Reflex [[id number]]  (Normal) Collected: 02/07/24 1743    Specimen: Blood Updated: 02/07/24 1814     Lactate 2.0 mmol/L     Phosphorus [[id number]]  (Normal) Collected: 02/07/24 1743    Specimen: Blood Updated: 02/07/24 1814     Phosphorus 3.0 mg/dL     Protime-INR [[id number]]  (Abnormal) Collected: 02/07/24 1411    Specimen: Blood Updated: 02/07/24 1517     Protime 14.3 Seconds      INR 1.10    aPTT [[id number]]  (Normal) Collected: 02/07/24 1411    Specimen: Blood Updated: 02/07/24 1517     PTT 28.0 seconds     Blood Culture - Blood, Hand, Digit Right [[id number]] Collected: 02/07/24 1420    Specimen: Blood from Hand, Digit Right Updated: 02/07/24 1451    Comprehensive Metabolic Panel [[id number]]  (Abnormal) Collected: 02/07/24 1411    Specimen: Blood Updated: 02/07/24 1449     Glucose 161 mg/dL      BUN 56 mg/dL      Creatinine 0.97 mg/dL      Sodium 161 mmol/L      Potassium 4.0 mmol/L      Comment: Slight hemolysis detected by analyzer. Result may be falsely elevated.        Chloride 115 mmol/L      CO2 27.0 mmol/L      Calcium 11.4 mg/dL      Total Protein 8.8 g/dL      Albumin 4.9 g/dL      ALT (SGPT) 13 U/L      AST (SGOT) 20 U/L      Comment: Slight hemolysis detected by analyzer. Result may be falsely elevated.        Alkaline Phosphatase 62 U/L      Total  "Bilirubin 0.9 mg/dL      Globulin 3.9 gm/dL      A/G Ratio 1.3 g/dL      BUN/Creatinine Ratio 57.7     Anion Gap 19.0 mmol/L      eGFR 73.1 mL/min/1.73     Narrative:      GFR Normal >60  Chronic Kidney Disease <60  Kidney Failure <15      Lactic Acid, Plasma [[id number]]  (Abnormal) Collected: 02/07/24 1411    Specimen: Blood Updated: 02/07/24 1448     Lactate 2.4 mmol/L     Procalcitonin [[id number]]  (Abnormal) Collected: 02/07/24 1411    Specimen: Blood Updated: 02/07/24 1445     Procalcitonin 0.42 ng/mL     Narrative:      As a Marker for Sepsis (Non-Neonates):    1. <0.5 ng/mL represents a low risk of severe sepsis and/or septic shock.  2. >2 ng/mL represents a high risk of severe sepsis and/or septic shock.    As a Marker for Lower Respiratory Tract Infections that require antibiotic therapy:    PCT on Admission    Antibiotic Therapy       6-12 Hrs later    >0.5                Strongly Recommended  >0.25 - <0.5        Recommended   0.1 - 0.25          Discouraged              Remeasure/reassess PCT  <0.1                Strongly Discouraged     Remeasure/reassess PCT    As 28 day mortality risk marker: \"Change in Procalcitonin Result\" (>80% or <=80%) if Day 0 (or Day 1) and Day 4 values are available. Refer to http://www.[employer]-pct-calculator.com    Change in PCT <=80%  A decrease of PCT levels below or equal to 80% defines a positive change in PCT test result representing a higher risk for 28-day all-cause mortality of patients diagnosed with severe sepsis for septic shock.    Change in PCT >80%  A decrease of PCT levels of more than 80% defines a negative change in PCT result representing a lower risk for 28-day all-cause mortality of patients diagnosed with severe sepsis or septic shock.       C-reactive Protein [[id number]]  (Normal) Collected: 02/07/24 1411    Specimen: Blood Updated: 02/07/24 1442     C-Reactive Protein <0.30 mg/dL     BNP [[id number]]  (Abnormal) Collected: 02/07/24 1411    Specimen: Blood " Updated: 02/07/24 1437     proBNP 1,058.0 pg/mL     Narrative:      This assay is used as an aid in the diagnosis of individuals suspected of having heart failure. It can be used as an aid in the diagnosis of acute decompensated heart failure (ADHF) in patients presenting with signs and symptoms of ADHF to the emergency department (ED). In addition, NT-proBNP of <300 pg/mL indicates ADHF is not likely.    Age Range Result Interpretation  NT-proBNP Concentration (pg/mL:      <50             Positive            >450                   Gray                 300-450                    Negative             <300    50-75           Positive            >900                  Gray                300-900                  Negative            <300      >75             Positive            >1800                  Gray                300-1800                  Negative            <300    Sedimentation Rate [[id number]]  (Abnormal) Collected: 02/07/24 1416    Specimen: Blood Updated: 02/07/24 1427     Sed Rate 108 mm/hr     CBC & Differential [[id number]]  (Abnormal) Collected: 02/07/24 1416    Specimen: Blood Updated: 02/07/24 1419    Narrative:      The following orders were created for panel order CBC & Differential.  Procedure                               Abnormality         Status                     ---------                               -----------         ------                     CBC Auto Differential[[id number]]        Abnormal            Final result                 Please view results for these tests on the individual orders.    CBC Auto Differential [[id number]]  (Abnormal) Collected: 02/07/24 1416    Specimen: Blood Updated: 02/07/24 1419     WBC 15.14 10*3/mm3      RBC 4.23 10*6/mm3      Hemoglobin 13.3 g/dL      Hematocrit 42.6 %      .7 fL      MCH 31.4 pg      MCHC 31.2 g/dL      RDW 15.4 %      RDW-SD 56.6 fl      MPV 11.8 fL      Platelets 312 10*3/mm3      Neutrophil % 86.1 %      Lymphocyte % 7.5 %       Monocyte % 5.6 %      Eosinophil % 0.0 %      Basophil % 0.5 %      Immature Grans % 0.3 %      Neutrophils, Absolute 13.04 10*3/mm3      Lymphocytes, Absolute 1.14 10*3/mm3      Monocytes, Absolute 0.85 10*3/mm3      Eosinophils, Absolute 0.00 10*3/mm3      Basophils, Absolute 0.07 10*3/mm3      Immature Grans, Absolute 0.04 10*3/mm3      nRBC 0.0 /100 WBC     COVID-19, FLU A/B, RSV PCR 1 HR TAT - Swab, Nasopharynx [[id number]]  (Normal) Collected: 02/07/24 1215    Specimen: Swab from Nasopharynx Updated: 02/07/24 1340     COVID19 Not Detected     Influenza A PCR Not Detected     Influenza B PCR Not Detected     RSV, PCR Not Detected    Narrative:      Fact sheet for providers: https://www.fda.gov/media/526695/download    Fact sheet for patients: https://www.fda.gov/media/114686/download    Test performed by PCR.    Urinalysis With Culture If Indicated - Urine, Catheter [[id number]]  (Abnormal) Collected: 02/07/24 1218    Specimen: Urine, Catheter Updated: 02/07/24 1305     Color, UA Dark Yellow     Appearance, UA Turbid     pH, UA 7.0     Specific Gravity, UA 1.024     Glucose, UA Negative     Ketones, UA 15 mg/dL (1+)     Bilirubin, UA Negative     Blood, UA Large (3+)     Protein, UA >=300 mg/dL (3+)     Leuk Esterase, UA Large (3+)     Nitrite, UA Negative     Urobilinogen, UA 1.0 E.U./dL    Narrative:      In absence of clinical symptoms, the presence of pyuria, bacteria, and/or nitrites on the urinalysis result does not correlate with infection.    Urinalysis, Microscopic Only - Urine, Catheter [[id number]]  (Abnormal) Collected: 02/07/24 1218    Specimen: Urine, Catheter Updated: 02/07/24 1305     RBC, UA 6-10 /HPF      WBC, UA 21-50 /HPF      Bacteria, UA 3+ /HPF      Squamous Epithelial Cells, UA 3-6 /HPF      Hyaline Casts, UA 0-2 /LPF      Methodology Manual Light Microscopy    Urine Culture - Urine, Urine, Catheter [[id number]] Collected: 02/07/24 1218    Specimen: Urine, Catheter Updated: 02/07/24 1307     Blood Culture - Blood, Foot, Right [[id number]] Collected: 02/07/24 1228    Specimen: Blood from Foot, Right Updated: 02/07/24 1247          Imaging Results (Last 24 Hours)       Procedure Component Value Units Date/Time    CT Abdomen Pelvis Without Contrast [[id number]] Collected: 02/07/24 1720     Updated: 02/07/24 1733    Narrative:      CT ABDOMEN PELVIS WO CONTRAST- 2/7/2024 3:49 PM     HISTORY: Sepsis; R50.9-Fever, unspecified; N39.0-Urinary tract  infection, site not specified; A41.9-Sepsis, unspecified organism;  E87.0-Hyperosmolality and hypernatremia      COMPARISON: 1/29/2024     DLP: 784.68 mGy.cm . All CT scans are performed using dose optimization  techniques as appropriate to the performed exam and including at least  one of the following: Automated exposure control, adjustment of the mA  and/or kV according to size, and the use of the iterative reconstruction  technique.     TECHNIQUE: Noncontrast enhanced images of the abdomen and pelvis  obtained without oral contrast. The study is degraded by motion  artifact.     FINDINGS:  Bibasilar atelectasis is present. The base of the heart is unremarkable.  There is no pericardial effusion..     LIVER: No focal liver lesion.     BILIARY SYSTEM: The gallbladder is unremarkable. No intrahepatic or  extrahepatic ductal dilatation.     PANCREAS: No focal pancreatic lesion.     SPLEEN: Unremarkable.     KIDNEYS AND ADRENALS: The adrenals are unremarkable. Bilateral  nonobstructing nephrolithiasis is present. A left-sided nephrostomy tube  appears to be well positioned within the renal pelvis. A left-sided  ureteral stent is also in place. There is progressive distention of the  upper tracts of the left kidney and proximal left ureter in comparison  to the previous exam. There is again evidence of pyeloureteritis with  periureteral stranding and thickening of the urothelium.. The right  ureter is decompressed and normal in appearance with no evidence of  ureteral  calculus..     RETROPERITONEUM: No mass, lymphadenopathy or hemorrhage.     GI TRACT: A percutaneous gastrostomy feeding tube is in place. It is  well-positioned. The stomach is moderately distended and air-filled. No  evidence of gastric wall thickening or definite gastric outlet  obstruction. There is a large volume of stool throughout the colon which  could be contributing to stasis. A left paramedian ventral wall hernia  is noted at the umbilicus. There is slight protrusion of a portion of  the transverse colon into this defect without evidence of incarceration  or obstruction. There is increased stool within the colon to just above  the level of the rectum. No fecal impaction. No significant small bowel  distention.. The appendix is not clearly identified..     OTHER: There is no mesenteric mass, lymphadenopathy or fluid collection.  The osseous structures and soft tissues demonstrate no worrisome  lesions. No free fluid is present.     PELVIS: A Mojica catheter is in place within the bladder with the urinary  bladder decompressed.. The uterus and adnexa are unremarkable. No free  fluid in the cul-de-sac..       Impression:      1. A left-sided nephrostomy tube and ureteral stent remains in place and  well-positioned. There is mild progressive distention of the upper  tracts of the left kidney as well as of the proximal and mid left ureter  when compared to the previous exam. There is associated pyeloureteritis  with urothelial thickening and stranding surrounding the left renal  pelvis and proximal ureter. Dysfunction of the indwelling stent is  suspected. The right kidney demonstrates nonobstructing nephrolithiasis.  No right-sided ureteral stone is present.  2. Large volume of stool within the colon. No fecal impaction within the  rectal vault. The stomach is moderately distended with air and fluid  with an air-fluid level but without definite gastric wall thickening or  convincing evidence of gastric outlet  obstruction. No evidence of  mechanical obstruction.  3. Just to the left of midline at the umbilicus there is a small  abdominal wall defect. Slight protrusion of a portion of the transverse  colon into this defect is present without incarceration or obstruction.  4. The uterus and adnexa are unremarkable..           This report was signed and finalized on 2/7/2024 5:30 PM by Dr. Yuniel De Jesus MD.       XR Chest 1 View [[id number]] Collected: 02/07/24 1244     Updated: 02/07/24 1250    Narrative:      EXAMINATION: XR CHEST 1 VW-  2/7/2024 12:44 PM 1 view     HISTORY: fever     COMPARISON: 1/29/2024     TECHNIQUE: A single frontal view of the chest was obtained.     FINDINGS:  This examination is limited by positioning of the patient's head over  the RIGHT upper lung/chest. This significantly limits evaluation of the  upper half of the RIGHT lung. The remainder of the lungs are clear. The  heart is not enlarged. A tracheostomy tube is in place. Some gaseous  distention of loops of bowel in the upper abdomen       Impression:         1.  Exam limited by patient positioning as the patient's head obscures  much of the RIGHT hemithorax. The remaining visualized portion of the  lungs are clear.          2.  There are gaseous distended loops of bowel in the upper abdomen.           This report was signed and finalized on 2/7/2024 12:47 PM by Dr. Taj Bergeron MD.             I have personally reviewed and interpreted the radiology studies and ECG obtained at time of admission.     Assessment / Plan   Assessment:   Active Hospital Problems    Diagnosis     **Severe sepsis     History of anoxic brain injury     Hypernatremia     Infection associated with nephrostomy catheter     Functional quadriplegia     Dysphagia     Severe malnutrition     Complicated UTI (urinary tract infection)      Severe sepsis  Recurrent urinary tract infection, complicated, associated to nephrostomy tube/chronic indwelling  catheter  Hypernatremia  History of anoxic brain injury  Percutaneous gastrostomy tube in place, possible malfunction  Chronic left nephrostomy tube in place  Spastic quadriplegia  History of pulmonary embolism 2021  History of COVID-19 2021      Sodium 161.  Potassium 4.0.  BUN 56.  Creatinine 0.97.  Lactate 2.4.  White blood cell count 15.1.  Hemoglobin 13.3.  Hematocrit 42.6.      Last urine culture from January 29, 2024:  E. coli ESBL sensitive to ertapenem and meropenem  Proteus mirabilis resistant to quinolones and nitrofurantoin  Pseudomonas resistant to Levaquin.      CT scan of the abdomen pelvis without contrast performed January 29, 2024 showed bilateral nonobstructing nephrolithiasis.  Left-sided percutaneous nephrostomy catheter in place.  Left-sided ureteral stent in place.  Decompressed bladder with a Mojica catheter in place.  Bilateral pyelourine uritis.  Gastrostomy tube in place.  Subcutaneous fat infiltration right gluteal area and right lower abdominal wall.      At this time, most likely sepsis secondary to recurrent urinary tract infection.  Cannot rule out aspiration and pneumonia due to body habitus.    Treatment Plan  The patient will be admitted to my service here at [facility].   Continue meropenem IV.  Pharmacy to adjust dose.    IV fluids, given hypernatremia the patient will have D5 at 125 MLS per hour.  Will follow electrolytes and renal function.  CT scan of the abdomen and pelvis without contrast has been requested, to evaluate position of tubes and possible hydronephrosis/abscess given new sepsis.  Evaluate position of PEG tube, and then start flushing with saline; may need exchange of gastrostomy tube if nonfunctioning.  Patient to have nutritional evaluation.   Infectious disease specialist evaluation.    Urology evaluation.    Follow cultures.      Discussed with patient's  over the phone.    Heparin subcutaneous for DVT prophylaxis.  Prognosis is poor given  functional status.  Palliative care evaluation requested.    Medical Decision Making  Number and Complexity of problems: 9, high complexity  Differential Diagnosis: See above    Conditions and Status        Condition is worsening.     MDM Data  External documents reviewed: Reviewed  Cardiac tracing (EKG, telemetry) interpretation: Reviewed  Radiology interpretation: Radiology reports reviewed  Labs reviewed: Yes  Any tests that were considered but not ordered: No     Decision rules/scores evaluated (example [id number], Wells, etc): None     Discussed with:      Care Planning  Shared decision making: With   Code status and discussions: No chest compressions.   desires intubation, pressor support, if needed.    Disposition  Social Determinants of Health that impact treatment or disposition: Bedridden, functional spastic quadriplegia  Estimated length of stay is unknown at this point.     I confirmed that the patient's advanced care plan is present, code status is documented, and a surrogate decision maker is listed in the patient's medical record.     The patient's surrogate decision maker is family member.     The patient was seen and examined by me on February 7, 2024 at 3 PM.    Electronically signed by Tae Charles MD, 02/07/24, 18:31 CST.

## 2024-02-07 NOTE — CONSULTS
20 gauge 2.5 inch USGPIV placed in right upper arm with 2 attempt(s).  Pt has extremely limited peripheral iv options due to all extremities being contracted.

## 2024-02-07 NOTE — PROGRESS NOTES
"Pharmacy Dosing Service  Antimicrobial Dosing  Meropenem    Assessment/Action/Plan:  Based on indication and renal function, Meropenem 1 gm IV every 8 hours. Pharmacy will continue to monitor daily and make further adjustment(s) accordingly.     Subjective:  Namita Zabala is a 48 y.o. female with a  \"Pharmacy to Dose Meropenem\" consult for the treatment of Sepsis/UTI-ESBL , day 1 of 7 of treatment.    Objective:  Ht: 152.4 cm (60\"); Wt: 52.4 kg (115 lb 8 oz)  Estimated Creatinine Clearance: 59.9 mL/min (by C-G formula based on SCr of 0.97 mg/dL).   Creatinine   Date Value Ref Range Status   02/07/2024 0.97 0.57 - 1.00 mg/dL Final   01/29/2024 0.54 (L) 0.57 - 1.00 mg/dL Final   01/10/2024 0.40 (L) 0.57 - 1.00 mg/dL Final   01/09/2024 0.59 0.57 - 1.11 mg/dL Final   01/08/2024 0.60 0.57 - 1.11 mg/dL Final   01/08/2024 0.66 0.57 - 1.11 mg/dL Final   10/20/2023 0.47 (L) 0.60 - 1.10 mg/dL Final   10/19/2023 0.51 (L) 0.60 - 1.10 mg/dL Final   10/16/2023 0.48 (L) 0.60 - 1.10 mg/dL Final   04/25/2019 1 (H) 0.5 - 0.9 mg/dL Final      Lab Results   Component Value Date    WBC 15.14 (H) 02/07/2024    WBC 8.33 01/29/2024    WBC 9.14 01/10/2024      Baseline culture results:  Microbiology Results (last 10 days)       Procedure Component Value - Date/Time    COVID-19, FLU A/B, RSV PCR 1 HR TAT - Swab, Nasopharynx [[id number]]  (Normal) Collected: 02/07/24 1215    Lab Status: Final result Specimen: Swab from Nasopharynx Updated: 02/07/24 1340     COVID19 Not Detected     Influenza A PCR Not Detected     Influenza B PCR Not Detected     RSV, PCR Not Detected    Narrative:      Fact sheet for providers: https://www.fda.gov/media/834670/download    Fact sheet for patients: https://www.fda.gov/media/854064/download    Test performed by PCR.    COVID PRE-OP / PRE-PROCEDURE SCREENING ORDER (NO ISOLATION) - Swab, Nasal Cavity [[id number]]  (Normal) Collected: 01/29/24 0414    Lab Status: Final result Specimen: Swab from Nasal " Cavity Updated: 01/29/24 0449    Narrative:      The following orders were created for panel order COVID PRE-OP / PRE-PROCEDURE SCREENING ORDER (NO ISOLATION) - Swab, Nasal Cavity.  Procedure                               Abnormality         Status                     ---------                               -----------         ------                     COVID-19 and FLU A/B PCR...[[id number]]  Normal              Final result                 Please view results for these tests on the individual orders.    COVID-19 and FLU A/B PCR, 1 HR TAT - Swab, Nasopharynx [[id number]]  (Normal) Collected: 01/29/24 0414    Lab Status: Final result Specimen: Swab from Nasopharynx Updated: 01/29/24 0449     COVID19 Not Detected     Influenza A PCR Not Detected     Influenza B PCR Not Detected    Narrative:      Fact sheet for providers: https://www.fda.gov/media/655885/download    Fact sheet for patients: https://www.fda.gov/media/351532/download    Test performed by PCR.    Urine Culture - Urine, Indwelling Urethral Catheter [[id number]]  (Abnormal)  (Susceptibility) Collected: 01/29/24 0413    Lab Status: Final result Specimen: Urine from Indwelling Urethral Catheter Updated: 02/01/24 0950     Urine Culture >100,000 CFU/mL Escherichia coli ESBL     Comment:   Consider infectious disease consult.  Susceptibility results may not correlate to clinical outcomes.         >100,000 CFU/mL Proteus mirabilis      >100,000 CFU/mL Pseudomonas aeruginosa    Narrative:      Colonization of the urinary tract without infection is common. Treatment is discouraged unless the patient is symptomatic, pregnant, or undergoing an invasive urologic procedure.  Recent outcomes data supports the use of pip/tazo in the treatment of susceptible ESBL infections for uncomplicated UTI. Consider use of pip/tazo as a carbapenem-sparing regimen in applicable patients.    Susceptibility        Escherichia coli ESBL      SANDEEP      Amikacin 4 ug/ml Susceptible       Ertapenem <=0.5 ug/ml Susceptible      Gentamicin >=16 ug/ml Resistant      Levofloxacin >=8 ug/ml Resistant      Meropenem <=0.25 ug/ml Susceptible      Nitrofurantoin <=16 ug/ml Susceptible      Piperacillin + Tazobactam <=4 ug/ml Susceptible      Tobramycin >=16 ug/ml Resistant      Trimethoprim + Sulfamethoxazole >=320 ug/ml Resistant                       Susceptibility        Proteus mirabilis      SANDEEP      Amoxicillin + Clavulanate <=2 ug/ml Susceptible      Ampicillin <=2 ug/ml Susceptible      Ampicillin + Sulbactam <=2 ug/ml Susceptible      Cefazolin <=4 ug/ml Susceptible      Cefepime <=1 ug/ml Susceptible      Ceftazidime <=1 ug/ml Susceptible      Ceftriaxone <=1 ug/ml Susceptible      Gentamicin <=1 ug/ml Susceptible      Levofloxacin 4 ug/ml Resistant      Nitrofurantoin 128 ug/ml Resistant      Piperacillin + Tazobactam <=4 ug/ml Susceptible      Trimethoprim + Sulfamethoxazole <=20 ug/ml Susceptible                       Susceptibility        Pseudomonas aeruginosa      SANDEEP      Cefepime 8 ug/ml Susceptible      Ceftazidime 4 ug/ml Susceptible      Ciprofloxacin 1 ug/ml Intermediate      Levofloxacin 4 ug/ml Resistant      Piperacillin + Tazobactam 8 ug/ml Susceptible      Tobramycin <=1 ug/ml Susceptible                           Blood Culture - Blood, Hand, Left [[id number]]  (Normal) Collected: 01/29/24 0331    Lab Status: Final result Specimen: Blood from Hand, Left Updated: 02/03/24 0400     Blood Culture No growth at 5 days    Blood Culture - Blood, Hand, Right [[id number]]  (Normal) Collected: 01/29/24 0318    Lab Status: Final result Specimen: Blood from Hand, Right Updated: 02/03/24 0400     Blood Culture No growth at 5 days            Sherri Allison, PharmD  02/07/24 15:06 CST

## 2024-02-07 NOTE — ED PROVIDER NOTES
Subjective   History of Present Illness  Patient is a 48-year-old female with a history of traumatic brain injury, pulmonary emboli and renal failure who presents to the ER with a fever.  Patient is a nursing home resident and has had a fever for the last week.  They have also noted an increased respiratory rate.  Her G-tube is also not functioning.  No further HPI could be obtained due to patient being nonverbal.      Review of Systems   Unable to perform ROS: Patient nonverbal   Constitutional:  Positive for fever.       Past Medical History:   Diagnosis Date    Acute kidney failure, unspecified     Angina at rest     Anoxic brain damage, not elsewhere classified     Chronic pulmonary embolism     Chronic respiratory failure, unspecified whether with hypoxia or hypercapnia     Dependence on respirator (ventilator) status     Gastrointestinal hemorrhage, unspecified     Hypercalcemia     Iron deficiency anemia     Metabolic encephalopathy     Migraine     Sees Pain Management for injections.     MVP (mitral valve prolapse)     Other dysphagia     Other pulmonary embolism with acute cor pulmonale     Renal disorder     Ruptured bladder     Syncope     Urinary tract infection, site not specified        Allergies   Allergen Reactions    Coconut Unknown - High Severity    Levaquin [Levofloxacin] Other (See Comments)     unknown    Nuts Unknown - High Severity    Penicillins     [place] Other (See Comments)     Causes migraines per pt reports       Past Surgical History:   Procedure Laterality Date    ABDOMINAL SURGERY      BREAST BIOPSY Right 2011    benign    GTUBE INSERTION      INSERTION HEMODIALYSIS CATHETER Left 09/16/2021    Procedure: HEMODIALYSIS CATHETER INSERTION;  Surgeon: Anders Kaur MD;  Location: [place] [address];  Service: Vascular;  Laterality: Left;    TONSILLECTOMY      TRACHEOSTOMY         Family History   Problem Relation Age of Onset    Colon cancer Maternal Aunt 52    Fibromyalgia  Mother     Heart disease Mother     Hypertension Mother     Hodgkin's lymphoma Paternal Grandmother     Ovarian cancer Neg Hx     Breast cancer Neg Hx        Social History     Socioeconomic History    Marital status:    Tobacco Use    Smoking status: Never    Smokeless tobacco: Never   Vaping Use    Vaping Use: Never used   Substance and Sexual Activity    Alcohol use: No    Drug use: No           Objective   Physical Exam  Vitals and nursing note reviewed.   Constitutional:       Appearance: She is well-developed.   HENT:      Head: Normocephalic and atraumatic.      Nose: Congestion and rhinorrhea present.   Eyes:      Conjunctiva/sclera: Conjunctivae normal.      Pupils: Pupils are equal, round, and reactive to light.   Neck:      Comments: Trach in place with sputum  Cardiovascular:      Rate and Rhythm: Regular rhythm. Tachycardia present.      Heart sounds: Normal heart sounds.   Pulmonary:      Effort: Tachypnea present.      Breath sounds: Rhonchi present.   Abdominal:      Palpations: Abdomen is soft.      Tenderness: There is no abdominal tenderness.   Musculoskeletal:      Cervical back: Normal range of motion.      Comments: Contracted in all extremities   Skin:     General: Skin is warm.   Neurological:      Mental Status: She is alert.   Psychiatric:         Behavior: Behavior normal.         Procedures           ED Course          EKG as interpreted by me: Sinus tachycardia with a rate of 152, no acute ischemia or infarction                           Lab Results (last 24 hours)       Procedure Component Value Units Date/Time    COVID-19, FLU A/B, RSV PCR 1 HR TAT - Swab, Nasopharynx [[id number]]  (Normal) Collected: 02/07/24 1215    Specimen: Swab from Nasopharynx Updated: 02/07/24 1340     COVID19 Not Detected     Influenza A PCR Not Detected     Influenza B PCR Not Detected     RSV, PCR Not Detected    Narrative:      Fact sheet for providers: https://www.fda.gov/media/987159/download    Fact  sheet for patients: https://www.[employer].gov/media/713389/download    Test performed by PCR.    Urinalysis With Culture If Indicated - Urine, Catheter [[id number]]  (Abnormal) Collected: 02/07/24 1218    Specimen: Urine, Catheter Updated: 02/07/24 1305     Color, UA Dark Yellow     Appearance, UA Turbid     pH, UA 7.0     Specific Gravity, UA 1.024     Glucose, UA Negative     Ketones, UA 15 mg/dL (1+)     Bilirubin, UA Negative     Blood, UA Large (3+)     Protein, UA >=300 mg/dL (3+)     Leuk Esterase, UA Large (3+)     Nitrite, UA Negative     Urobilinogen, UA 1.0 E.U./dL    Narrative:      In absence of clinical symptoms, the presence of pyuria, bacteria, and/or nitrites on the urinalysis result does not correlate with infection.    Urinalysis, Microscopic Only - Urine, Catheter [[id number]]  (Abnormal) Collected: 02/07/24 1218    Specimen: Urine, Catheter Updated: 02/07/24 1305     RBC, UA 6-10 /HPF      WBC, UA 21-50 /HPF      Bacteria, UA 3+ /HPF      Squamous Epithelial Cells, UA 3-6 /HPF      Hyaline Casts, UA 0-2 /LPF      Methodology Manual Light Microscopy    Urine Culture - Urine, Urine, Catheter [[id number]] Collected: 02/07/24 1218    Specimen: Urine, Catheter Updated: 02/07/24 1304    Blood Culture - Blood, Foot, Right [[id number]] Collected: 02/07/24 1228    Specimen: Blood from Foot, Right Updated: 02/07/24 1247    Comprehensive Metabolic Panel [[id number]]  (Abnormal) Collected: 02/07/24 1411    Specimen: Blood Updated: 02/07/24 1449     Glucose 161 mg/dL      BUN 56 mg/dL      Creatinine 0.97 mg/dL      Sodium 161 mmol/L      Potassium 4.0 mmol/L      Comment: Slight hemolysis detected by analyzer. Result may be falsely elevated.        Chloride 115 mmol/L      CO2 27.0 mmol/L      Calcium 11.4 mg/dL      Total Protein 8.8 g/dL      Albumin 4.9 g/dL      ALT (SGPT) 13 U/L      AST (SGOT) 20 U/L      Comment: Slight hemolysis detected by analyzer. Result may be falsely elevated.        Alkaline Phosphatase  62 U/L      Total Bilirubin 0.9 mg/dL      Globulin 3.9 gm/dL      A/G Ratio 1.3 g/dL      BUN/Creatinine Ratio 57.7     Anion Gap 19.0 mmol/L      eGFR 73.1 mL/min/1.73     Narrative:      GFR Normal >60  Chronic Kidney Disease <60  Kidney Failure <15      Protime-INR [[id number]] Collected: 02/07/24 1411    Specimen: Blood Updated: 02/07/24 1416    aPTT [[id number]] Collected: 02/07/24 1411    Specimen: Blood Updated: 02/07/24 1416    BNP [[id number]]  (Abnormal) Collected: 02/07/24 1411    Specimen: Blood Updated: 02/07/24 1437     proBNP 1,058.0 pg/mL     Narrative:      This assay is used as an aid in the diagnosis of individuals suspected of having heart failure. It can be used as an aid in the diagnosis of acute decompensated heart failure (ADHF) in patients presenting with signs and symptoms of ADHF to the emergency department (ED). In addition, NT-proBNP of <300 pg/mL indicates ADHF is not likely.    Age Range Result Interpretation  NT-proBNP Concentration (pg/mL:      <50             Positive            >450                   Gray                 300-450                    Negative             <300    50-75           Positive            >900                  Gray                300-900                  Negative            <300      >75             Positive            >1800                  Gray                300-1800                  Negative            <300    Lactic Acid, Plasma [[id number]]  (Abnormal) Collected: 02/07/24 1411    Specimen: Blood Updated: 02/07/24 1448     Lactate 2.4 mmol/L     Procalcitonin [[id number]]  (Abnormal) Collected: 02/07/24 1411    Specimen: Blood Updated: 02/07/24 1445     Procalcitonin 0.42 ng/mL     Narrative:      As a Marker for Sepsis (Non-Neonates):    1. <0.5 ng/mL represents a low risk of severe sepsis and/or septic shock.  2. >2 ng/mL represents a high risk of severe sepsis and/or septic shock.    As a Marker for Lower Respiratory Tract Infections that require  "antibiotic therapy:    PCT on Admission    Antibiotic Therapy       6-12 Hrs later    >0.5                Strongly Recommended  >0.25 - <0.5        Recommended   0.1 - 0.25          Discouraged              Remeasure/reassess PCT  <0.1                Strongly Discouraged     Remeasure/reassess PCT    As 28 day mortality risk marker: \"Change in Procalcitonin Result\" (>80% or <=80%) if Day 0 (or Day 1) and Day 4 values are available. Refer to http://www.[facility]-pct-calculator.com    Change in PCT <=80%  A decrease of PCT levels below or equal to 80% defines a positive change in PCT test result representing a higher risk for 28-day all-cause mortality of patients diagnosed with severe sepsis for septic shock.    Change in PCT >80%  A decrease of PCT levels of more than 80% defines a negative change in PCT result representing a lower risk for 28-day all-cause mortality of patients diagnosed with severe sepsis or septic shock.       C-reactive Protein [[id number]]  (Normal) Collected: 02/07/24 1411    Specimen: Blood Updated: 02/07/24 1442     C-Reactive Protein <0.30 mg/dL     CBC & Differential [[id number]]  (Abnormal) Collected: 02/07/24 1416    Specimen: Blood Updated: 02/07/24 1419    Narrative:      The following orders were created for panel order CBC & Differential.  Procedure                               Abnormality         Status                     ---------                               -----------         ------                     CBC Auto Differential[[id number]]        Abnormal            Final result                 Please view results for these tests on the individual orders.    Sedimentation Rate [[id number]]  (Abnormal) Collected: 02/07/24 1416    Specimen: Blood Updated: 02/07/24 1427     Sed Rate 108 mm/hr     CBC Auto Differential [[id number]]  (Abnormal) Collected: 02/07/24 1416    Specimen: Blood Updated: 02/07/24 1419     WBC 15.14 10*3/mm3      RBC 4.23 10*6/mm3      Hemoglobin 13.3 g/dL      " Hematocrit 42.6 %      .7 fL      MCH 31.4 pg      MCHC 31.2 g/dL      RDW 15.4 %      RDW-SD 56.6 fl      MPV 11.8 fL      Platelets 312 10*3/mm3      Neutrophil % 86.1 %      Lymphocyte % 7.5 %      Monocyte % 5.6 %      Eosinophil % 0.0 %      Basophil % 0.5 %      Immature Grans % 0.3 %      Neutrophils, Absolute 13.04 10*3/mm3      Lymphocytes, Absolute 1.14 10*3/mm3      Monocytes, Absolute 0.85 10*3/mm3      Eosinophils, Absolute 0.00 10*3/mm3      Basophils, Absolute 0.07 10*3/mm3      Immature Grans, Absolute 0.04 10*3/mm3      nRBC 0.0 /100 WBC     Blood Culture - Blood, Hand, Digit Right [[id number]] Collected: 02/07/24 1420    Specimen: Blood from Hand, Digit Right Updated: 02/07/24 1451           XR Chest 1 View   Final Result       1.  Exam limited by patient positioning as the patient's head obscures   much of the RIGHT hemithorax. The remaining visualized portion of the   lungs are clear.            2.  There are gaseous distended loops of bowel in the upper abdomen.               This report was signed and finalized on 2/7/2024 12:47 PM by Dr. Taj Bergeron MD.                       Medical Decision Making  Patient is a 48-year-old female with a history of traumatic brain injury, pulmonary emboli and renal failure who presents to the ER with a fever.  Patient is a nursing home resident and has had a fever for the last week.  They have also noted an increased respiratory rate.  Her G-tube is also not functioning.  No further HPI could be obtained due to patient being nonverbal    Differential diagnosis: Sepsis, UTI, pneumonia, viral syndrome    Cultures were obtained.  Patient was given aztreonam and vancomycin.  Patient was given Tylenol for fever.  Labs showed an elevated BUN as well as hypernatremia.  Patient also had leukocytosis, elevated sed rate and procalcitonin.  Patient also had elevated lactate, UTI and elevated BNP.  Chest x-ray showed no acute findings.  I discussed the case  with Dr. De La Torre with the hospitalist service.  He recommended adding meropenem to the antibiotics.  Patient was then admitted to Dr. Pro's service for further treatment.    Problems Addressed:  Acute UTI: complicated acute illness or injury  Fever, unspecified fever cause: complicated acute illness or injury  Hypernatremia: complicated acute illness or injury  Sepsis without acute organ dysfunction, due to unspecified organism: complicated acute illness or injury    Amount and/or Complexity of Data Reviewed  Labs: ordered. Decision-making details documented in ED Course.  Radiology: ordered. Decision-making details documented in ED Course.  ECG/medicine tests: ordered. Decision-making details documented in ED Course.  Discussion of management or test interpretation with external provider(s): Dr De La Torre with the hospitalist group    Risk  OTC drugs.  Prescription drug management.  Decision regarding hospitalization.        Final diagnoses:   Fever, unspecified fever cause   Acute UTI   Sepsis without acute organ dysfunction, due to unspecified organism   Hypernatremia       ED Disposition  ED Disposition       ED Disposition   Decision to Admit    Condition   --    Comment   Level of Care: Critical Care [6]   Diagnosis: Sepsis [[id number]]   Admitting Physician: ROSA PRO [[id number]]   Attending Physician: ROSA PRO [[id number]]   Certification: I Certify That Inpatient Hospital Services Are Medically Necessary For Greater Than 2 Midnights                 No follow-up provider specified.       Medication List      No changes were made to your prescriptions during this visit.            Dea Lopez MD  02/07/24 [id number]

## 2024-02-07 NOTE — PROGRESS NOTES
INFECTIOUS DISEASES CONSULT NOTE    Patient:  Namita Zabala 48 y.o. female  ROOM # 46/46  YOB: 1975  MRN: [id number]  [facility]:  [id number]  Admit date: 2/7/2024   Admitting Physician: Dea Lopez MD  Primary Care Physician: David Lara MD  REFERRING PROVIDER: Dea Lopez MD    Consults    REASON FOR CONSULTATION : Sepsis      HISTORY OF PRESENT ILLNESS: Patient is a 48-year-old patient with diagnosis of functional quadriplegia secondary to anoxic brain injury.  Her  was at bedside to provide independent history.    He reported she was sent to the ER for fever and clogged G-tube.  He was told by the nursing home that they were unable to give her pain medication.  He also noted that she was in the emergency department at the end of January, diagnosed with urinary tract infection.  She received ceftriaxone in the emergency department and was sent out on Keflex.  There is notation that once her final susceptibilities were available, labs were faxed to Dr. Colbert's office for antibiotic change    He reports her nephrostomy tube was changed not too long ago at [facility] however he is not sure how long her Mojica catheter has been in place.          Past Medical History:   Diagnosis Date    Acute kidney failure, unspecified     Angina at rest     Anoxic brain damage, not elsewhere classified     Chronic pulmonary embolism     Chronic respiratory failure, unspecified whether with hypoxia or hypercapnia     Dependence on respirator (ventilator) status     Gastrointestinal hemorrhage, unspecified     Hypercalcemia     Iron deficiency anemia     Metabolic encephalopathy     Migraine     Sees Pain Management for injections.     MVP (mitral valve prolapse)     Other dysphagia     Other pulmonary embolism with acute cor pulmonale     Renal disorder     Ruptured bladder     Syncope     Urinary tract infection, site not specified    Pulmonary embolism  Pelvic hematoma subsequently  developed pelvic abscess      Past Surgical History:   Procedure Laterality Date    ABDOMINAL SURGERY      BREAST BIOPSY Right 2011    benign    GTUBE INSERTION      INSERTION HEMODIALYSIS CATHETER Left 09/16/2021    Procedure: HEMODIALYSIS CATHETER INSERTION;  Surgeon: Anders Kaur MD;  Location: [place] [address];  Service: Vascular;  Laterality: Left;    TONSILLECTOMY      TRACHEOSTOMY       Surgery for pelvic abscess at [place] found to have necrotic bladder.  Underwent cystorrhaphy but had continued bladder leak.  Developed necrotizing abdominal wall infection and underwent multiple debridements.  Patient initially had bilateral percutaneous nephrostomies placed and subsequently ended up with left nephrostomy and indwelling Mojica catheter    Family History   Problem Relation Age of Onset    Colon cancer Maternal Aunt 52    Fibromyalgia Mother     Heart disease Mother     Hypertension Mother     Hodgkin's lymphoma Paternal Grandmother     Ovarian cancer Neg Hx     Breast cancer Neg Hx      Social History     Socioeconomic History    Marital status:    Tobacco Use    Smoking status: Never    Smokeless tobacco: Never   Vaping Use    Vaping Use: Never used   Substance and Sexual Activity    Alcohol use: No    Drug use: No       Current Scheduled Medications:   famotidine, 20 mg, Intravenous, Q12H  heparin (porcine), 5,000 Units, Subcutaneous, Q12H  meropenem, 1,000 mg, Intravenous, Q8H  sodium chloride, 10 mL, Intravenous, Q12H        Current PRN Medications:    acetaminophen    senna-docusate sodium **AND** polyethylene glycol **AND** bisacodyl **AND** bisacodyl    Morphine    ondansetron    Pharmacy to Dose meropenem (MERREM)    [COMPLETED] Insert Peripheral IV **AND** sodium chloride    sodium chloride    sodium chloride      dextrose, 125 mL/hr, Last Rate: 125 mL/hr (02/07/24 6644)  Pharmacy to Dose meropenem (MERREM),              Allergies   Allergen Reactions    Coconut Unknown - High  "Severity    Levaquin [Levofloxacin] Other (See Comments)     unknown    Nuts Unknown - High Severity    Penicillins     [place] Other (See Comments)     Causes migraines per pt reports           Vital Signs:  /99   Pulse (!) 145   Temp (!) 101.2 °F (38.4 °C) (Axillary)   Resp 20   Ht 152.4 cm (60\")   Wt 52.4 kg (115 lb 8 oz)   LMP  (LMP Unknown)   SpO2 98%   BMI 22.56 kg/m²   Temp (24hrs), Av.1 °F (38.9 °C), Min:100.7 °F (38.2 °C), Max:103.4 °F (39.7 °C)      Physical Exam  General: Patient is acutely ill-appearing lying in bed.  Her  and father are at bedside  HEENT: Sclera anicteric.  Neck: Trach in place with trach collar in place  Heart: Distant, tachycardic with current rate 138  Lungs: Diminished breath sounds throughout  Abdomen: G-tube in place without any significant drainage or erythema  Unable to turn patient well to review the insertion of her nephrostomy tube.  She does have urine and then tried nephrostomy tube bag it is slightly cloudy.      Line/IV site: Peripheral IV right upper arm.  No evidence of phlebitis     Results Review:    I reviewed the patient's new clinical results.    Lab Results:    CBC:   Lab Results   Lab 24  1416   WBC 15.14*   HEMOGLOBIN 13.3   HEMATOCRIT 42.6   PLATELETS 312        AutoDiff:   Lab Results   Lab 24  1416   NEUTROPHIL % 86.1*   LYMPHOCYTE % 7.5*   MONOCYTES % 5.6   EOSINOPHIL % 0.0*   BASOPHIL % 0.5   NEUTROS ABS 13.04*   LYMPHS ABS 1.14   MONOS ABS 0.85   EOS ABS 0.00   BASOS ABS 0.07        Manual Diff:    Lab Results   Lab 24  1416   NEUTROS ABS 13.04*        CMP:   Lab Results   Lab 24  1411   SODIUM 161*   POTASSIUM 4.0   CHLORIDE 115*   CO2 27.0   BUN 56*   CREATININE 0.97   CALCIUM 11.4*   BILIRUBIN 0.9   ALK PHOS 62   ALT (SGPT) 13   AST (SGOT) 20   GLUCOSE 161*       Lab Results (last 72 hours)       Procedure Component Value Units Date/Time    Protime-INR [[id number]]  (Abnormal) Collected: 24 1411    " Specimen: Blood Updated: 02/07/24 1517     Protime 14.3 Seconds      INR 1.10    aPTT [[id number]]  (Normal) Collected: 02/07/24 1411    Specimen: Blood Updated: 02/07/24 1517     PTT 28.0 seconds     Blood Culture - Blood, Hand, Digit Right [[id number]] Collected: 02/07/24 1420    Specimen: Blood from Hand, Digit Right Updated: 02/07/24 1451    Comprehensive Metabolic Panel [[id number]]  (Abnormal) Collected: 02/07/24 1411    Specimen: Blood Updated: 02/07/24 1449     Glucose 161 mg/dL      BUN 56 mg/dL      Creatinine 0.97 mg/dL      Sodium 161 mmol/L      Potassium 4.0 mmol/L      Comment: Slight hemolysis detected by analyzer. Result may be falsely elevated.        Chloride 115 mmol/L      CO2 27.0 mmol/L      Calcium 11.4 mg/dL      Total Protein 8.8 g/dL      Albumin 4.9 g/dL      ALT (SGPT) 13 U/L      AST (SGOT) 20 U/L      Comment: Slight hemolysis detected by analyzer. Result may be falsely elevated.        Alkaline Phosphatase 62 U/L      Total Bilirubin 0.9 mg/dL      Globulin 3.9 gm/dL      A/G Ratio 1.3 g/dL      BUN/Creatinine Ratio 57.7     Anion Gap 19.0 mmol/L      eGFR 73.1 mL/min/1.73     Narrative:      GFR Normal >60  Chronic Kidney Disease <60  Kidney Failure <15      Lactic Acid, Plasma [[id number]]  (Abnormal) Collected: 02/07/24 1411    Specimen: Blood Updated: 02/07/24 1448     Lactate 2.4 mmol/L     Procalcitonin [[id number]]  (Abnormal) Collected: 02/07/24 1411    Specimen: Blood Updated: 02/07/24 1445     Procalcitonin 0.42 ng/mL     Narrative:      As a Marker for Sepsis (Non-Neonates):    1. <0.5 ng/mL represents a low risk of severe sepsis and/or septic shock.  2. >2 ng/mL represents a high risk of severe sepsis and/or septic shock.    As a Marker for Lower Respiratory Tract Infections that require antibiotic therapy:    PCT on Admission    Antibiotic Therapy       6-12 Hrs later    >0.5                Strongly Recommended  >0.25 - <0.5        Recommended   0.1 - 0.25          Discouraged  "             Remeasure/reassess PCT  <0.1                Strongly Discouraged     Remeasure/reassess PCT    As 28 day mortality risk marker: \"Change in Procalcitonin Result\" (>80% or <=80%) if Day 0 (or Day 1) and Day 4 values are available. Refer to http://www.[email or web address]-pct-calculator.com    Change in PCT <=80%  A decrease of PCT levels below or equal to 80% defines a positive change in PCT test result representing a higher risk for 28-day all-cause mortality of patients diagnosed with severe sepsis for septic shock.    Change in PCT >80%  A decrease of PCT levels of more than 80% defines a negative change in PCT result representing a lower risk for 28-day all-cause mortality of patients diagnosed with severe sepsis or septic shock.       C-reactive Protein [[id number]]  (Normal) Collected: 02/07/24 1411    Specimen: Blood Updated: 02/07/24 1442     C-Reactive Protein <0.30 mg/dL     BNP [[id number]]  (Abnormal) Collected: 02/07/24 1411    Specimen: Blood Updated: 02/07/24 1437     proBNP 1,058.0 pg/mL     Narrative:      This assay is used as an aid in the diagnosis of individuals suspected of having heart failure. It can be used as an aid in the diagnosis of acute decompensated heart failure (ADHF) in patients presenting with signs and symptoms of ADHF to the emergency department (ED). In addition, NT-proBNP of <300 pg/mL indicates ADHF is not likely.    Age Range Result Interpretation  NT-proBNP Concentration (pg/mL:      <50             Positive            >450                   Gray                 300-450                    Negative             <300    50-75           Positive            >900                  Gray                300-900                  Negative            <300      >75             Positive            >1800                  Gray                300-1800                  Negative            <300    Sedimentation Rate [[id number]]  (Abnormal) Collected: 02/07/24 1416    Specimen: Blood Updated: " 02/07/24 1427     Sed Rate 108 mm/hr     CBC & Differential [[id number]]  (Abnormal) Collected: 02/07/24 1416    Specimen: Blood Updated: 02/07/24 1419    Narrative:      The following orders were created for panel order CBC & Differential.  Procedure                               Abnormality         Status                     ---------                               -----------         ------                     CBC Auto Differential[[id number]]        Abnormal            Final result                 Please view results for these tests on the individual orders.    CBC Auto Differential [[id number]]  (Abnormal) Collected: 02/07/24 1416    Specimen: Blood Updated: 02/07/24 1419     WBC 15.14 10*3/mm3      RBC 4.23 10*6/mm3      Hemoglobin 13.3 g/dL      Hematocrit 42.6 %      .7 fL      MCH 31.4 pg      MCHC 31.2 g/dL      RDW 15.4 %      RDW-SD 56.6 fl      MPV 11.8 fL      Platelets 312 10*3/mm3      Neutrophil % 86.1 %      Lymphocyte % 7.5 %      Monocyte % 5.6 %      Eosinophil % 0.0 %      Basophil % 0.5 %      Immature Grans % 0.3 %      Neutrophils, Absolute 13.04 10*3/mm3      Lymphocytes, Absolute 1.14 10*3/mm3      Monocytes, Absolute 0.85 10*3/mm3      Eosinophils, Absolute 0.00 10*3/mm3      Basophils, Absolute 0.07 10*3/mm3      Immature Grans, Absolute 0.04 10*3/mm3      nRBC 0.0 /100 WBC     COVID-19, FLU A/B, RSV PCR 1 HR TAT - Swab, Nasopharynx [[id number]]  (Normal) Collected: 02/07/24 1215    Specimen: Swab from Nasopharynx Updated: 02/07/24 1340     COVID19 Not Detected     Influenza A PCR Not Detected     Influenza B PCR Not Detected     RSV, PCR Not Detected    Narrative:      Fact sheet for providers: https://www.fda.gov/media/981772/download    Fact sheet for patients: https://www.fda.gov/media/367097/download    Test performed by PCR.    Urinalysis With Culture If Indicated - Urine, Catheter [[id number]]  (Abnormal) Collected: 02/07/24 1218    Specimen: Urine, Catheter Updated: 02/07/24  1305     Color, UA Dark Yellow     Appearance, UA Turbid     pH, UA 7.0     Specific Gravity, UA 1.024     Glucose, UA Negative     Ketones, UA 15 mg/dL (1+)     Bilirubin, UA Negative     Blood, UA Large (3+)     Protein, UA >=300 mg/dL (3+)     Leuk Esterase, UA Large (3+)     Nitrite, UA Negative     Urobilinogen, UA 1.0 E.U./dL    Narrative:      In absence of clinical symptoms, the presence of pyuria, bacteria, and/or nitrites on the urinalysis result does not correlate with infection.    Urinalysis, Microscopic Only - Urine, Catheter [[id number]]  (Abnormal) Collected: 02/07/24 1218    Specimen: Urine, Catheter Updated: 02/07/24 1305     RBC, UA 6-10 /HPF      WBC, UA 21-50 /HPF      Bacteria, UA 3+ /HPF      Squamous Epithelial Cells, UA 3-6 /HPF      Hyaline Casts, UA 0-2 /LPF      Methodology Manual Light Microscopy    Urine Culture - Urine, Urine, Catheter [[id number]] Collected: 02/07/24 1218    Specimen: Urine, Catheter Updated: 02/07/24 1304    Blood Culture - Blood, Foot, Right [[id number]] Collected: 02/07/24 1228    Specimen: Blood from Foot, Right Updated: 02/07/24 1247            Estimated Creatinine Clearance: 59.9 mL/min (by C-G formula based on SCr of 0.97 mg/dL).    Culture Results:    Microbiology Results (last 10 days)       Procedure Component Value - Date/Time    COVID-19, FLU A/B, RSV PCR 1 HR TAT - Swab, Nasopharynx [[id number]]  (Normal) Collected: 02/07/24 1215    Lab Status: Final result Specimen: Swab from Nasopharynx Updated: 02/07/24 1340     COVID19 Not Detected     Influenza A PCR Not Detected     Influenza B PCR Not Detected     RSV, PCR Not Detected    Narrative:      Fact sheet for providers: https://www.fda.gov/media/423700/download    Fact sheet for patients: https://www.fda.gov/media/029369/download    Test performed by PCR.    COVID PRE-OP / PRE-PROCEDURE SCREENING ORDER (NO ISOLATION) - Swab, Nasal Cavity [[id number]]  (Normal) Collected: 01/29/24 0414    Lab Status: Final  result Specimen: Swab from Nasal Cavity Updated: 01/29/24 0449    Narrative:      The following orders were created for panel order COVID PRE-OP / PRE-PROCEDURE SCREENING ORDER (NO ISOLATION) - Swab, Nasal Cavity.  Procedure                               Abnormality         Status                     ---------                               -----------         ------                     COVID-19 and FLU A/B PCR...[[id number]]  Normal              Final result                 Please view results for these tests on the individual orders.    COVID-19 and FLU A/B PCR, 1 HR TAT - Swab, Nasopharynx [[id number]]  (Normal) Collected: 01/29/24 0414    Lab Status: Final result Specimen: Swab from Nasopharynx Updated: 01/29/24 0449     COVID19 Not Detected     Influenza A PCR Not Detected     Influenza B PCR Not Detected    Narrative:      Fact sheet for providers: https://www.fda.gov/media/581286/download    Fact sheet for patients: https://www.fda.gov/media/889548/download    Test performed by PCR.    Urine Culture - Urine, Indwelling Urethral Catheter [[id number]]  (Abnormal)  (Susceptibility) Collected: 01/29/24 0413    Lab Status: Final result Specimen: Urine from Indwelling Urethral Catheter Updated: 02/01/24 0950     Urine Culture >100,000 CFU/mL Escherichia coli ESBL     Comment:   Consider infectious disease consult.  Susceptibility results may not correlate to clinical outcomes.         >100,000 CFU/mL Proteus mirabilis      >100,000 CFU/mL Pseudomonas aeruginosa    Narrative:      Colonization of the urinary tract without infection is common. Treatment is discouraged unless the patient is symptomatic, pregnant, or undergoing an invasive urologic procedure.  Recent outcomes data supports the use of pip/tazo in the treatment of susceptible ESBL infections for uncomplicated UTI. Consider use of pip/tazo as a carbapenem-sparing regimen in applicable patients.    Susceptibility        Escherichia coli ESBL      SANDEEP       Amikacin Susceptible      Ertapenem Susceptible      Gentamicin Resistant      Levofloxacin Resistant      Meropenem Susceptible      Nitrofurantoin Susceptible      Piperacillin + Tazobactam Susceptible      Tobramycin Resistant      Trimethoprim + Sulfamethoxazole Resistant                       Susceptibility        Proteus mirabilis      SANDEEP      Amoxicillin + Clavulanate Susceptible      Ampicillin Susceptible      Ampicillin + Sulbactam Susceptible      Cefazolin Susceptible      Cefepime Susceptible      Ceftazidime Susceptible      Ceftriaxone Susceptible      Gentamicin Susceptible      Levofloxacin Resistant      Nitrofurantoin Resistant      Piperacillin + Tazobactam Susceptible      Trimethoprim + Sulfamethoxazole Susceptible                       Susceptibility        Pseudomonas aeruginosa      SANDEEP      Cefepime Susceptible      Ceftazidime Susceptible      Ciprofloxacin Intermediate      Levofloxacin Resistant      Piperacillin + Tazobactam Susceptible      Tobramycin Susceptible                           Blood Culture - Blood, Hand, Left [[id number]]  (Normal) Collected: 01/29/24 0331    Lab Status: Final result Specimen: Blood from Hand, Left Updated: 02/03/24 0400     Blood Culture No growth at 5 days    Blood Culture - Blood, Hand, Right [[id number]]  (Normal) Collected: 01/29/24 0318    Lab Status: Final result Specimen: Blood from Hand, Right Updated: 02/03/24 0400     Blood Culture No growth at 5 days               Radiology:         Nephrostomy tube and left ureteral stent in place left kidney.  Hydronephrosis noted.  Edema and stranding noted around the left proximal ureter concerning for problem with ureteral stent..  Stool noted throughout the colon.      Imaging Results (Last 72 Hours)       Procedure Component Value Units Date/Time    CT Abdomen Pelvis Without Contrast [[id number]] Collected: 02/07/24 1720     Updated: 02/07/24 1733    Narrative:      CT ABDOMEN PELVIS WO CONTRAST- 2/7/2024  3:49 PM     HISTORY: Sepsis; R50.9-Fever, unspecified; N39.0-Urinary tract  infection, site not specified; A41.9-Sepsis, unspecified organism;  E87.0-Hyperosmolality and hypernatremia      COMPARISON: 1/29/2024     DLP: 784.68 mGy.cm . All CT scans are performed using dose optimization  techniques as appropriate to the performed exam and including at least  one of the following: Automated exposure control, adjustment of the mA  and/or kV according to size, and the use of the iterative reconstruction  technique.     TECHNIQUE: Noncontrast enhanced images of the abdomen and pelvis  obtained without oral contrast. The study is degraded by motion  artifact.     FINDINGS:  Bibasilar atelectasis is present. The base of the heart is unremarkable.  There is no pericardial effusion..     LIVER: No focal liver lesion.     BILIARY SYSTEM: The gallbladder is unremarkable. No intrahepatic or  extrahepatic ductal dilatation.     PANCREAS: No focal pancreatic lesion.     SPLEEN: Unremarkable.     KIDNEYS AND ADRENALS: The adrenals are unremarkable. Bilateral  nonobstructing nephrolithiasis is present. A left-sided nephrostomy tube  appears to be well positioned within the renal pelvis. A left-sided  ureteral stent is also in place. There is progressive distention of the  upper tracts of the left kidney and proximal left ureter in comparison  to the previous exam. There is again evidence of pyeloureteritis with  periureteral stranding and thickening of the urothelium.. The right  ureter is decompressed and normal in appearance with no evidence of  ureteral calculus..     RETROPERITONEUM: No mass, lymphadenopathy or hemorrhage.     GI TRACT: A percutaneous gastrostomy feeding tube is in place. It is  well-positioned. The stomach is moderately distended and air-filled. No  evidence of gastric wall thickening or definite gastric outlet  obstruction. There is a large volume of stool throughout the colon which  could be contributing to  stasis. A left paramedian ventral wall hernia  is noted at the umbilicus. There is slight protrusion of a portion of  the transverse colon into this defect without evidence of incarceration  or obstruction. There is increased stool within the colon to just above  the level of the rectum. No fecal impaction. No significant small bowel  distention.. The appendix is not clearly identified..     OTHER: There is no mesenteric mass, lymphadenopathy or fluid collection.  The osseous structures and soft tissues demonstrate no worrisome  lesions. No free fluid is present.     PELVIS: A Mojica catheter is in place within the bladder with the urinary  bladder decompressed.. The uterus and adnexa are unremarkable. No free  fluid in the cul-de-sac..       Impression:      1. A left-sided nephrostomy tube and ureteral stent remains in place and  well-positioned. There is mild progressive distention of the upper  tracts of the left kidney as well as of the proximal and mid left ureter  when compared to the previous exam. There is associated pyeloureteritis  with urothelial thickening and stranding surrounding the left renal  pelvis and proximal ureter. Dysfunction of the indwelling stent is  suspected. The right kidney demonstrates nonobstructing nephrolithiasis.  No right-sided ureteral stone is present.  2. Large volume of stool within the colon. No fecal impaction within the  rectal vault. The stomach is moderately distended with air and fluid  with an air-fluid level but without definite gastric wall thickening or  convincing evidence of gastric outlet obstruction. No evidence of  mechanical obstruction.  3. Just to the left of midline at the umbilicus there is a small  abdominal wall defect. Slight protrusion of a portion of the transverse  colon into this defect is present without incarceration or obstruction.  4. The uterus and adnexa are unremarkable..           This report was signed and finalized on 2/7/2024 5:30 PM by  Dr. Yuniel De Jesus MD.       XR Chest 1 View [[id number]] Collected: 02/07/24 1244     Updated: 02/07/24 1250    Narrative:      EXAMINATION: XR CHEST 1 VW-  2/7/2024 12:44 PM 1 view     HISTORY: fever     COMPARISON: 1/29/2024     TECHNIQUE: A single frontal view of the chest was obtained.     FINDINGS:  This examination is limited by positioning of the patient's head over  the RIGHT upper lung/chest. This significantly limits evaluation of the  upper half of the RIGHT lung. The remainder of the lungs are clear. The  heart is not enlarged. A tracheostomy tube is in place. Some gaseous  distention of loops of bowel in the upper abdomen       Impression:         1.  Exam limited by patient positioning as the patient's head obscures  much of the RIGHT hemithorax. The remaining visualized portion of the  lungs are clear.          2.  There are gaseous distended loops of bowel in the upper abdomen.           This report was signed and finalized on 2/7/2024 12:47 PM by Dr. Taj Bergeron MD.                 HOSPITAL PROBLEM LIST:      Sepsis      IMPRESSION:  Sepsis likely secondary to urinary tract infection.  A threat to life or bodily function.  Patient is febrile, tachycardic, leukocytosis and urinary source of infection most likely..  Urinary tract infection with urine culture positive for ESBL E. coli, Proteus mirabilis and Pseudomonas aeruginosa collected January 29, 2024 while in the emergency department.  CT scan also raising concern for malfunction of left ureteral stent.  There is notation from the emergency department February 1 that they had contacted Dr. Colbert, fax labs to his office so they could change patient's antibiotics.  When treated in the emergency department, she was given a dose of ceftriaxone and discharged on Keflex.  The med list in [facility] for prior to admission still included Keflex.  Did not see any IV antibiotics or other antibiotics listed.  Reported penicillin allergy-discussed with  patient's dad.  She had rash with penicillin.  He does not recall any swelling, shortness of breath, etc. per review of the chart she is receiving cefepime, ceftriaxone, cephalexin and carbapenems  Hypernatremia       RECOMMENDATION:   Continue meropenem given ESBL E. coli and history of Pseudomonas along with Proteus from urine culture January 29  Will ask microbiology if they can unsuppressed any carbapenems for the Proteus and Pseudomonas in her urine culture from January 29  Follow-up on urine culture obtained today-this was obtained from the Mojica catheter per verbal report from the emergency department nurse.  This was not obtained from the nephrostomy tube.  IV fluid support for sepsis.  Monitor hemodynamics.  Blood pressure is stable however she remains quite tachycardic.  Agree with not redosing vancomycin at this time  Await further urology input regarding left ureteral stent.       Reviewed [facility] urology note from January 11, 2024  Discussed with  and son-independent historians  Rachael Foy MD  02/07/24  17:38 CST

## 2024-02-08 ENCOUNTER — APPOINTMENT (OUTPATIENT)
Dept: GENERAL RADIOLOGY | Facility: HOSPITAL | Age: 49
DRG: 698 | End: 2024-02-08
Payer: MEDICARE

## 2024-02-08 LAB
ANION GAP SERPL CALCULATED.3IONS-SCNC: 10 MMOL/L (ref 5–15)
ANION GAP SERPL CALCULATED.3IONS-SCNC: 16 MMOL/L (ref 5–15)
BACTERIA BLD CULT: ABNORMAL
BACTERIA SPEC AEROBE CULT: ABNORMAL
BACTERIA UR QL AUTO: ABNORMAL /HPF
BASOPHILS # BLD AUTO: 0.05 10*3/MM3 (ref 0–0.2)
BASOPHILS NFR BLD AUTO: 0.5 % (ref 0–1.5)
BILIRUB UR QL STRIP: NEGATIVE
BOTTLE TYPE: ABNORMAL
BUN SERPL-MCNC: 49 MG/DL (ref 6–20)
BUN SERPL-MCNC: 53 MG/DL (ref 6–20)
BUN/CREAT SERPL: 58.2 (ref 7–25)
BUN/CREAT SERPL: 71 (ref 7–25)
CALCIUM SPEC-SCNC: 10.1 MG/DL (ref 8.6–10.5)
CALCIUM SPEC-SCNC: 9.8 MG/DL (ref 8.6–10.5)
CHLORIDE SERPL-SCNC: 118 MMOL/L (ref 98–107)
CHLORIDE SERPL-SCNC: 119 MMOL/L (ref 98–107)
CLARITY UR: ABNORMAL
CO2 SERPL-SCNC: 20 MMOL/L (ref 22–29)
CO2 SERPL-SCNC: 27 MMOL/L (ref 22–29)
COD CRY URNS QL: ABNORMAL /HPF
COLOR UR: YELLOW
CREAT SERPL-MCNC: 0.69 MG/DL (ref 0.57–1)
CREAT SERPL-MCNC: 0.91 MG/DL (ref 0.57–1)
DEPRECATED RDW RBC AUTO: 69.3 FL (ref 37–54)
EGFRCR SERPLBLD CKD-EPI 2021: 108.5 ML/MIN/1.73
EGFRCR SERPLBLD CKD-EPI 2021: 79 ML/MIN/1.73
EOSINOPHIL # BLD AUTO: 0 10*3/MM3 (ref 0–0.4)
EOSINOPHIL NFR BLD AUTO: 0 % (ref 0.3–6.2)
ERYTHROCYTE [DISTWIDTH] IN BLOOD BY AUTOMATED COUNT: 15.7 % (ref 12.3–15.4)
GLUCOSE SERPL-MCNC: 157 MG/DL (ref 65–99)
GLUCOSE SERPL-MCNC: 163 MG/DL (ref 65–99)
GLUCOSE UR STRIP-MCNC: NEGATIVE MG/DL
HCT VFR BLD AUTO: 47.9 % (ref 34–46.6)
HGB BLD-MCNC: 12.9 G/DL (ref 12–15.9)
HGB UR QL STRIP.AUTO: ABNORMAL
HOLD SPECIMEN: NORMAL
HYALINE CASTS UR QL AUTO: ABNORMAL /LPF
KETONES UR QL STRIP: ABNORMAL
LEUKOCYTE ESTERASE UR QL STRIP.AUTO: ABNORMAL
LYMPHOCYTES # BLD AUTO: 2 10*3/MM3 (ref 0.7–3.1)
LYMPHOCYTES NFR BLD AUTO: 19.1 % (ref 19.6–45.3)
MAGNESIUM SERPL-MCNC: 2.7 MG/DL (ref 1.6–2.6)
MCH RBC QN AUTO: 31.4 PG (ref 26.6–33)
MCHC RBC AUTO-ENTMCNC: 26.9 G/DL (ref 31.5–35.7)
MCV RBC AUTO: 116.5 FL (ref 79–97)
MONOCYTES # BLD AUTO: 1.07 10*3/MM3 (ref 0.1–0.9)
MONOCYTES NFR BLD AUTO: 10.2 % (ref 5–12)
NEUTROPHILS NFR BLD AUTO: 69.7 % (ref 42.7–76)
NEUTROPHILS NFR BLD AUTO: 7.3 10*3/MM3 (ref 1.7–7)
NITRITE UR QL STRIP: POSITIVE
PH UR STRIP.AUTO: 5.5 [PH] (ref 5–8)
PHOSPHATE SERPL-MCNC: 3.3 MG/DL (ref 2.5–4.5)
PLATELET # BLD AUTO: 210 10*3/MM3 (ref 140–450)
PMV BLD AUTO: 11.5 FL (ref 6–12)
POTASSIUM SERPL-SCNC: 3.4 MMOL/L (ref 3.5–5.2)
POTASSIUM SERPL-SCNC: 3.7 MMOL/L (ref 3.5–5.2)
PROT UR QL STRIP: ABNORMAL
RBC # BLD AUTO: 4.11 10*6/MM3 (ref 3.77–5.28)
RBC # UR STRIP: ABNORMAL /HPF
REF LAB TEST METHOD: ABNORMAL
SODIUM SERPL-SCNC: 154 MMOL/L (ref 136–145)
SODIUM SERPL-SCNC: 156 MMOL/L (ref 136–145)
SP GR UR STRIP: 1.03 (ref 1–1.03)
SQUAMOUS #/AREA URNS HPF: ABNORMAL /HPF
UROBILINOGEN UR QL STRIP: ABNORMAL
WBC # UR STRIP: ABNORMAL /HPF
WBC NRBC COR # BLD AUTO: 10.47 10*3/MM3 (ref 3.4–10.8)
YEAST URNS QL MICRO: ABNORMAL /HPF

## 2024-02-08 PROCEDURE — 0 DEXTROSE 5 % SOLUTION: Performed by: FAMILY MEDICINE

## 2024-02-08 PROCEDURE — 3E0G76Z INTRODUCTION OF NUTRITIONAL SUBSTANCE INTO UPPER GI, VIA NATURAL OR ARTIFICIAL OPENING: ICD-10-PCS | Performed by: INTERNAL MEDICINE

## 2024-02-08 PROCEDURE — 0D20XUZ CHANGE FEEDING DEVICE IN UPPER INTESTINAL TRACT, EXTERNAL APPROACH: ICD-10-PCS | Performed by: INTERNAL MEDICINE

## 2024-02-08 PROCEDURE — 81001 URINALYSIS AUTO W/SCOPE: CPT | Performed by: INTERNAL MEDICINE

## 2024-02-08 PROCEDURE — 84100 ASSAY OF PHOSPHORUS: CPT | Performed by: FAMILY MEDICINE

## 2024-02-08 PROCEDURE — 94761 N-INVAS EAR/PLS OXIMETRY MLT: CPT

## 2024-02-08 PROCEDURE — 80048 BASIC METABOLIC PNL TOTAL CA: CPT | Performed by: FAMILY MEDICINE

## 2024-02-08 PROCEDURE — 93010 ELECTROCARDIOGRAM REPORT: CPT | Performed by: INTERNAL MEDICINE

## 2024-02-08 PROCEDURE — 0DH63UZ INSERTION OF FEEDING DEVICE INTO STOMACH, PERCUTANEOUS APPROACH: ICD-10-PCS | Performed by: INTERNAL MEDICINE

## 2024-02-08 PROCEDURE — 05HF33Z INSERTION OF INFUSION DEVICE INTO LEFT CEPHALIC VEIN, PERCUTANEOUS APPROACH: ICD-10-PCS | Performed by: HOSPITALIST

## 2024-02-08 PROCEDURE — 87086 URINE CULTURE/COLONY COUNT: CPT | Performed by: INTERNAL MEDICINE

## 2024-02-08 PROCEDURE — 99222 1ST HOSP IP/OBS MODERATE 55: CPT | Performed by: UROLOGY

## 2024-02-08 PROCEDURE — 25010000002 MEROPENEM PER 100 MG: Performed by: FAMILY MEDICINE

## 2024-02-08 PROCEDURE — 36415 COLL VENOUS BLD VENIPUNCTURE: CPT | Performed by: FAMILY MEDICINE

## 2024-02-08 PROCEDURE — 85025 COMPLETE CBC W/AUTO DIFF WBC: CPT | Performed by: FAMILY MEDICINE

## 2024-02-08 PROCEDURE — 99222 1ST HOSP IP/OBS MODERATE 55: CPT | Performed by: CLINICAL NURSE SPECIALIST

## 2024-02-08 PROCEDURE — 74018 RADEX ABDOMEN 1 VIEW: CPT

## 2024-02-08 PROCEDURE — 93005 ELECTROCARDIOGRAM TRACING: CPT | Performed by: FAMILY MEDICINE

## 2024-02-08 PROCEDURE — 25810000003 DEXTROSE 5 % WITH KCL 20 MEQ 20 MEQ/L SOLUTION: Performed by: FAMILY MEDICINE

## 2024-02-08 PROCEDURE — 83735 ASSAY OF MAGNESIUM: CPT | Performed by: FAMILY MEDICINE

## 2024-02-08 PROCEDURE — 25010000002 HEPARIN (PORCINE) PER 1000 UNITS: Performed by: FAMILY MEDICINE

## 2024-02-08 PROCEDURE — 99232 SBSQ HOSP IP/OBS MODERATE 35: CPT | Performed by: INTERNAL MEDICINE

## 2024-02-08 RX ORDER — DEXTROSE MONOHYDRATE, SODIUM CHLORIDE, AND POTASSIUM CHLORIDE 50; 1.49; 4.5 G/1000ML; G/1000ML; G/1000ML
125 INJECTION, SOLUTION INTRAVENOUS CONTINUOUS
Status: DISCONTINUED | OUTPATIENT
Start: 2024-02-08 | End: 2024-02-08

## 2024-02-08 RX ORDER — CHLORHEXIDINE GLUCONATE 500 MG/1
1 CLOTH TOPICAL EVERY 24 HOURS
Status: DISCONTINUED | OUTPATIENT
Start: 2024-02-09 | End: 2024-02-10 | Stop reason: HOSPADM

## 2024-02-08 RX ORDER — CHLORHEXIDINE GLUCONATE 500 MG/1
1 CLOTH TOPICAL ONCE
Status: COMPLETED | OUTPATIENT
Start: 2024-02-08 | End: 2024-02-08

## 2024-02-08 RX ORDER — POTASSIUM CHLORIDE, DEXTROSE MONOHYDRATE 150; 5 MG/100ML; G/100ML
50 INJECTION, SOLUTION INTRAVENOUS CONTINUOUS
Status: DISCONTINUED | OUTPATIENT
Start: 2024-02-08 | End: 2024-02-11

## 2024-02-08 RX ADMIN — HEPARIN SODIUM 5000 UNITS: 5000 INJECTION INTRAVENOUS; SUBCUTANEOUS at 10:00

## 2024-02-08 RX ADMIN — BACLOFEN 10 MG: 10 TABLET ORAL at 15:03

## 2024-02-08 RX ADMIN — DEXTROSE MONOHYDRATE 125 ML/HR: 50 INJECTION, SOLUTION INTRAVENOUS at 05:57

## 2024-02-08 RX ADMIN — HEPARIN SODIUM 5000 UNITS: 5000 INJECTION INTRAVENOUS; SUBCUTANEOUS at 20:19

## 2024-02-08 RX ADMIN — MEROPENEM 1000 MG: 1 INJECTION, POWDER, FOR SOLUTION INTRAVENOUS at 22:40

## 2024-02-08 RX ADMIN — Medication 1 APPLICATION: at 20:19

## 2024-02-08 RX ADMIN — ACETAMINOPHEN 325 MG: 650 SUPPOSITORY RECTAL at 08:56

## 2024-02-08 RX ADMIN — POTASSIUM CHLORIDE AND DEXTROSE MONOHYDRATE 100 ML/HR: 150; 5 INJECTION, SOLUTION INTRAVENOUS at 17:20

## 2024-02-08 RX ADMIN — BACLOFEN 10 MG: 10 TABLET ORAL at 22:40

## 2024-02-08 RX ADMIN — CHLORHEXIDINE GLUCONATE 1 APPLICATION: 500 CLOTH TOPICAL at 15:03

## 2024-02-08 RX ADMIN — MEROPENEM 1000 MG: 1 INJECTION, POWDER, FOR SOLUTION INTRAVENOUS at 05:57

## 2024-02-08 RX ADMIN — FAMOTIDINE 20 MG: 10 INJECTION INTRAVENOUS at 10:00

## 2024-02-08 RX ADMIN — Medication 10 ML: at 20:20

## 2024-02-08 RX ADMIN — FAMOTIDINE 20 MG: 10 INJECTION INTRAVENOUS at 20:20

## 2024-02-08 RX ADMIN — MEROPENEM 1000 MG: 1 INJECTION, POWDER, FOR SOLUTION INTRAVENOUS at 13:05

## 2024-02-08 RX ADMIN — Medication 1 APPLICATION: at 15:03

## 2024-02-08 RX ADMIN — POTASSIUM CHLORIDE, DEXTROSE MONOHYDRATE AND SODIUM CHLORIDE 125 ML/HR: 150; 5; 450 INJECTION, SOLUTION INTRAVENOUS at 08:55

## 2024-02-08 RX ADMIN — ACETAMINOPHEN 325 MG: 650 SUPPOSITORY RECTAL at 03:50

## 2024-02-08 NOTE — CONSULTS
[facility]    Palliative Care Initial Consult   Attending Physician: Tae Charles MD  Referring Provider: Tae Charles MD    Reason for Referral: assistance with clarification of goals of care and support for patient/family  Family/Support: spouse    Code Status and Medical Interventions:   Ordered at: 02/07/24 7987     Level Of Support Discussed With:    Health Care Surrogate     Code Status (Patient has no pulse and is not breathing):    No CPR (Do Not Attempt to Resuscitate)     Medical Interventions (Patient has pulse or is breathing):    Full Support     Goals of Care: TBD.    HPI:   48 y.o. female has a past medical history of  anoxic brain injury, spastic quadriplegia, tracheostomy present, chronic pulmonary embolism, chronic respiratory failure, retroperitoneal hematoma and hematoma with abscess leading to necrotizing infection of the pelvis and bladder rupture s/p nephroureteral stent placement, iron deficiency anemia, migraine, mitral valve prolapse, severe malnutrition, frequent urinary tract infections, pulmonary embolism (2021), and dysphagia s/p PEG.  ED 1/29 due urinary retention; ED 1/10 due to hypotension; Hospitalization 12/30-1/9 Sepsis; ED 12/28-12/29 Sepsis; ED 12/7 for trach tube change; Hospitalization 11/25-12/5 due to sepsis, respiratory failure, UTI; 10/8-10/9 due to sepsis secondary to UTI and infection associated with nephrostomy catheter, transferred to tertiary facility at  for left percutaneous nephroureteral tube exchange and gastrostomy tube replacement was completed on 10/18; ED visit 10/1 due to tracheostomy hemorrhage, UTI, and fever; ED visit 9/15 due to COVID-19, UTI, and acute hypernatremia.  Last seen by this provider during November hospitalization.  A MOST document was completed on 11/27 to include no CPR/limited support interventions, no intubation, no NIPPV.  Resident of [facility].  Most recent  urine culture from 1/29/2024 showed E. coli ESBL, Proteus mirabelis, and Pseudomonas.  Patient presented to [facility] on 2/7/2024 related to fevers.  According to chart review patient developed malfunctioning percutaneous gastrostomy tube for several days and further developed fevers starting 3 days prior to hospital.  ED workup shows sodium 162, magnesium 2.7, elevated lactate/procalcitonin/BNP, leukocytosis, urinalysis shows 3+ blood/3+ leuk esterase/nitrite negative/3+ bacteria.  Urine and blood cultures pending.  CT abdomen pelvis shows well-positioned left-sided nephrostomy tube and ureteral stents, mild progressive distention of the upper tracts of the left kidney as well as of the proximal and mid left ureter, associated pyeloureteritis.  Dysfunction of the indwelling stent is suspected.  The right kidney demonstrates nonobstructing nephrolithiasis.  Large volume stool within the colon, without evidence of mechanical obstruction.  Last imaging shows lungs are clear.  Treated with IV antibiotics and IV fluids.  Infectious disease, GI, and urology consulted.  Evaluated by speech therapy and not appropriate for skilled intervention as she does not follow commands and not appropriate for swallow evaluation.  Hypernatremia and leukocytosis improving.  Urology recommends transferring patient to  or [place] for indwelling left nephroureteral stent exchange once patient stabilized given complex urologic diagnoses.  GI plans for PEG tube replacement today.  Laying in bed without apparent distress, trach collar in place, temperature control device in place. No family at bedside. Palliative Care Spoke With: family spoke with spouse, Grant via telephone who reports patient has unfortunately has continued to have recurrent urinary tract infections.  Reports most recent ureteral stent exchange around 30 days ago.  She most recently has been seen at [facility] urology.  Remains in nursing  "facility due to complexity of history of anoxic brain injury, paraplegia, and contractures.  Due to the Palliative Care Topics Discussed: palliative care, goals of care, care options, resuscitation status, and [facility] we will establish an advance care plan.   Advance Care Planning   Advance Care Planning Discussion: Spoke with spouse, Grant via telephone with regard to events leading to current hospitalization, poor long-term prognosis secondary to severe sepsis, complicated UTI with frequent UTIs, suspected dysfunction, with indwelling stent, respiratory failure, anoxic brain injury, quadriplegia, debility, and multiple comorbidities.  Spouse admits patient unfortunately has very poor quality of life, but notes that \"she was talking 2 weeks ago, bright eyed, and told me she loved me\".  He does admit these episodes are random and far in between.  States this makes decision making more difficult as despite patient's complex medical history over the last several years he still remains hopeful for some improvement in mental status/function.  Discussed given patient's severe anoxic brain injury unlikely status will change and more likely will continue with rehospitalizations and decline secondary to multiple comorbidities.  Despite conversation spouse wishes to continue with current aggressive care interventions and CODE STATUS clarified to include no CPR/full support interventions.  With change in CODE STATUS from MOST document completed in November we will plan to complete a new document with new care goals when spouse available.  States that he wishes to pursue PEG tube exchange and wishes for patient to be transferred to tertiary facility for urologic stent exchange and prefers [facility], Dr. Rey.  He is not prepared to de-escalate care measures at this juncture and will continue to provide family support as needed.     Review of Systems   Unable to perform ROS: Acuity of condition     1- Pain " Assessment  CPOT Facial Expression: 0-->relaxed, neutral  CPOT Body Movements: 0-->absence of movements  CPOT Muscle Tension: 0-->relaxed  Ventilator Compliance/Vocalization: 0-->talking in normal tone or no sound  CPOT Score: 0  PAINAD Breathin-->noisy labored breathing, long period of hyperventilation, Cheyne-Carpio respirations  PAINAD Negative Vocalization: 0-->none  PAINAD Facial Expression: 2-->facial grimacing  PAINAD Body Language: 1-->tense, distressed pacing, fidgeting  PAINAD Consolability: 1-->distracted or reassured by voice/touch  PAINAD Score: 6    Past Medical History:   Diagnosis Date    Acute kidney failure, unspecified     Angina at rest     Anoxic brain damage, not elsewhere classified     Chronic pulmonary embolism     Chronic respiratory failure, unspecified whether with hypoxia or hypercapnia     Dependence on respirator (ventilator) status     Gastrointestinal hemorrhage, unspecified     Hypercalcemia     Iron deficiency anemia     Metabolic encephalopathy     Migraine     Sees Pain Management for injections.     MVP (mitral valve prolapse)     Other dysphagia     Other pulmonary embolism with acute cor pulmonale     Renal disorder     Ruptured bladder     Syncope     Urinary tract infection, site not specified      Past Surgical History:   Procedure Laterality Date    ABDOMINAL SURGERY      BREAST BIOPSY Right     benign    GTUBE INSERTION      INSERTION HEMODIALYSIS CATHETER Left 2021    Procedure: HEMODIALYSIS CATHETER INSERTION;  Surgeon: Anders Kaur MD;  Location: [place] [address];  Service: Vascular;  Laterality: Left;    TONSILLECTOMY      TRACHEOSTOMY       Social History     Socioeconomic History    Marital status:    Tobacco Use    Smoking status: Never    Smokeless tobacco: Never   Vaping Use    Vaping Use: Never used   Substance and Sexual Activity    Alcohol use: No    Drug use: No         Current Facility-Administered Medications:      acetaminophen (TYLENOL) suppository 325 mg, 325 mg, Rectal, Q4H PRN, Tae Charles MD, 325 mg at 02/08/24 0350    albuterol (PROVENTIL) nebulizer solution 0.083% 2.5 mg/3mL, 2.5 mg, Nebulization, Q4H PRN, Tae Charles MD    artificial tears ophthalmic ointment, , Both Eyes, Q1H PRN, Tae Charles MD    baclofen (LIORESAL) tablet 10 mg, 10 mg, Per G Tube, Q6H, Tae Charles MD    sennosides-docusate (PERICOLACE) 8.6-50 MG per tablet 2 tablet, 2 tablet, Oral, BID PRN **AND** polyethylene glycol (MIRALAX) packet 17 g, 17 g, Oral, Daily PRN **AND** bisacodyl (DULCOLAX) EC tablet 5 mg, 5 mg, Oral, Daily PRN **AND** bisacodyl (DULCOLAX) suppository 10 mg, 10 mg, Rectal, Daily PRN, Tae Charles MD    dextrose 5 % and sodium chloride 0.45 % with KCl 20 mEq/L infusion, 125 mL/hr, Intravenous, Continuous, Tae Charles MD    famotidine (PEPCID) injection 20 mg, 20 mg, Intravenous, Q12H, Tae Charles MD, 20 mg at 02/07/24 1605    heparin (porcine) 5000 UNIT/ML injection 5,000 Units, 5,000 Units, Subcutaneous, Q12H, Tae Charles MD, 5,000 Units at 02/07/24 2109    hydrOXYzine (ATARAX) tablet 25 mg, 25 mg, Per G Tube, Q6H PRN, Tae Charles MD    meropenem (MERREM) 1,000 mg in sodium chloride 0.9 % 100 mL IVPB, 1,000 mg, Intravenous, Q8H, Tae Charles MD, 1,000 mg at 02/08/24 0557    Morphine sulfate (PF) injection 1 mg, 1 mg, Intravenous, Q4H PRN, Tae Charles MD, 1 mg at 02/07/24 8572    multivitamin with minerals 1 tablet, 1 tablet, Oral, Daily, Tae Charles MD    ondansetron (ZOFRAN) injection 4 mg, 4 mg, Intravenous, Q6H PRN, Tae Charles MD    Pharmacy to Dose meropenem (MERREM), , Does not apply, Continuous PRN, Tae Charles MD    saccharomyces boulardii (FLORASTOR) capsule 250 mg, 250 mg, Per G Tube, Daily, Tae Charles MD    [COMPLETED] Insert Peripheral IV, , , Once **AND** sodium chloride 0.9 % flush 10 mL, 10 mL, Intravenous, PRN,  "Dea Lopez MD    sodium chloride 0.9 % flush 10 mL, 10 mL, Intravenous, Q12H, Tae Charles MD, 10 mL at 02/07/24 2114    sodium chloride 0.9 % flush 10 mL, 10 mL, Intravenous, PRN, Tae Charles MD    sodium chloride 0.9 % infusion 40 mL, 40 mL, Intravenous, PRN, Tae Charles MD    Allergies   Allergen Reactions    Coconut Unknown - High Severity    Levaquin [Levofloxacin] Other (See Comments)     unknown    Nuts Unknown - High Severity    Penicillins     [place] Other (See Comments)     Causes migraines per pt reports     I have utilized all available immediate resources to obtain, update, or review the patient's current medications (including all prescriptions, over-the-counter products, herbals, cannabis/cannabidiol products, and vitamin/mineral/dietary (nutritional) supplements) for name, route of administration, type, dose and frequency.      Intake/Output Summary (Last 24 hours) at 2/8/2024 0753  Last data filed at 2/8/2024 0400  Gross per 24 hour   Intake --   Output 750 ml   Net -750 ml       Physical Exam:    Diagnostics: Reviewed  /81   Pulse (!) 132   Temp 99 °F (37.2 °C) (Rectal)   Resp 21   Ht 152.4 cm (60\")   Wt 54.5 kg (120 lb 2.4 oz)   LMP  (LMP Unknown)   SpO2 100%   BMI 23.47 kg/m²     Vitals and nursing note reviewed.   Constitutional:       Appearance: Not in distress. Ill-appearing and chronically ill-appearing.      Comments: Trach collar   Eyes:      General: Lids are normal.   HENT:      Head: Normocephalic.   Neck:      Trachea: Tracheostomy present.   Pulmonary:      Effort: Pulmonary effort is normal.   Cardiovascular:      Tachycardia present.   Edema:     Peripheral edema present.  Musculoskeletal:      Comments: Contractures upper and lower extremities Skin:     General: Skin is warm.   Genitourinary:     Comments: Left-sided nephrostomy tube, Mojica catheter in place  Neurological:      Mental Status: Alert.      Comments: Nonverbal.  Closes eyes to " stimulus   Psychiatric:         Speech: Noncommunicative.         Cognition and Memory: Cognition is impaired.     Patient status: Disease state: Controlled with current treatments.  Current Functional status: Palliative Performance Scale Score: Performance 10% based on the following measures: Ambulation: Totally bed bound, Activity and Evidence of Disease: Unable to do any work, extensive evidence of disease, Self-Care: Total care required,  Intake: Mouth care only, LOC: Drowsy or comatose   Baseline Functional status: Palliative Performance Scale Score: Performance 10% based on the following measures: Ambulation: Totally bed bound, Activity and Evidence of Disease: Unable to do any work, extensive evidence of disease, Self-Care: Total care required,  Intake: Mouth care only, LOC: Drowsy or comatose   Nutritional status: Albumin 4.9 Body mass index is 23.47 kg/m².         Hospital Problem List      Severe sepsis    Complicated UTI (urinary tract infection)    Severe malnutrition    Functional quadriplegia    Dysphagia    Infection associated with nephrostomy catheter    History of anoxic brain injury    Hypernatremia    Impression/Problem List:    Severe sepsis  Complicated UTI with frequent UTIs  Pyeloureteritis, dysfunction of the indwelling stent is suspected per CT  Chronic respiratory failure with hypoxia  History of anoxic brain injury  Functional quadriplegia  Hypernatremia  History of retroperitoneal hematoma and hematoma with abscess leading to necrotizing infection of the pelvis and bladder rupture s/p nephroureteral stent placement,  Tracheostomy present  Severe malnutrition  Hypertension  Left-sided nephrostomy tube in place  Chronic pulmonary embolism  Iron deficiency anemia  Migraine  Mitral valve prolapse  Dysphagia s/p PEG  Contractures  Debility           Recommendations/Plan:  1. plan: Goals of care include CODE STATUS no CPR/full support interventions.    Family support: The patient receives  support from her .  Advance Directives: No advance directive on file     POA/Healthcare surrogate-spouse, Grant.    2.  Palliative care encounter  - Prognosis is poor long-term secondary to severe sepsis, complicated UTI with frequent UTIs, suspected dysfunction, with indwelling stent, respiratory failure, anoxic brain injury, quadriplegia, debility, and multiple comorbidities.  -Family appears to have poor prognostic awareness.     -Resident of [facility].      -Last seen by this provider during November hospitalization.  A MOST document was completed on 11/27 to include no CPR/limited support interventions, no intubation, no NIPPV.      -Urine and blood cultures pending.  Treated with IV antibiotics and IV fluids.   -Infectious disease, GI, and urology consulted.    -Evaluated by speech therapy and not appropriate for skilled intervention as she does not follow commands and not appropriate for swallow evaluation.    2/8-CODE STATUS clarified.  Will plan to complete a MOST document to include no CPR/full support interventions.    -Spouse wishes to pursue PEG tube exchange and wishes for patient to be [facility] tertiary [facility]    -family not prepared to de-escalate care measures at this juncture and we will continue to provide family support as needed.      Thank you for this consult and allowing us to participate in patient's plan of care. Palliative Care Team will continue to follow patient.     Roxane Sahni, APRN  2/8/2024  07:55 CST

## 2024-02-08 NOTE — PROGRESS NOTES
"INFECTIOUS DISEASES PROGRESS NOTE    Patient:  Namita Zabala  YOB: 1975  MRN: [id number]   Admit date: 2024   Admitting Physician: Tae Charles MD  Primary Care Physician: David Lara MD    Chief Complaint: non verbal      Interval History: Patient admitted to CCU overnight from the emergency department.  Did have temperature elevation to over 103.  I spoke with nurse overnight.  Patient had blood cultures obtained while in the emergency department so these were not repeated.    She since had a blood culture called positive for coagulase-negative staph.    Allergies:   Allergies   Allergen Reactions    Coconut Unknown - High Severity    Levaquin [Levofloxacin] Other (See Comments)     unknown    Nuts Unknown - High Severity    Penicillins     [place] Other (See Comments)     Causes migraines per pt reports       Current Scheduled Medications:   baclofen, 10 mg, Per G Tube, Q6H  famotidine, 20 mg, Intravenous, Q12H  heparin (porcine), 5,000 Units, Subcutaneous, Q12H  meropenem, 1,000 mg, Intravenous, Q8H  multivitamin with minerals, 1 tablet, Oral, Daily  saccharomyces boulardii, 250 mg, Per G Tube, Daily  sodium chloride, 10 mL, Intravenous, Q12H      Current PRN Medications:    acetaminophen    albuterol    artificial tears    senna-docusate sodium **AND** polyethylene glycol **AND** bisacodyl **AND** bisacodyl    hydrOXYzine    Morphine    ondansetron    Pharmacy to Dose meropenem (MERREM)    [COMPLETED] Insert Peripheral IV **AND** sodium chloride    sodium chloride    sodium chloride    dextrose 5 % and sodium chloride 0.45 % with KCl 20 mEq/L, 125 mL/hr  Pharmacy to Dose meropenem (MERREM),            Objective     Vital Signs:  Temp (24hrs), Av.9 °F (38.8 °C), Min:99 °F (37.2 °C), Max:103.4 °F (39.7 °C)      /98   Pulse (!) 135   Temp 99.9 °F (37.7 °C) (Rectal)   Resp 21   Ht 152.4 cm (60\")   Wt 54.5 kg (120 lb 2.4 oz)   LMP  (LMP Unknown)   SpO2 100% "   BMI 23.47 kg/m²         Physical Exam:  General: Patient is chronically ill contracted female lying in bed in the CCU.  Dr. Kenrs on oh and Namita RN at bedside.  Cardiac: Sinus tachycardia on the monitor  Abdomen: G-tube in place  Mojica catheter in place.  Nephrostomy tube in place-exit site not evaluated    Line/IV site: Peripheral IVs in bilateral upper arms.      Results Review:    I reviewed the patient's new clinical results.    Lab Results:    CBC:   Lab Results   Lab 02/07/24  1416 02/08/24  0342   WBC 15.14* 10.47   HEMOGLOBIN 13.3 12.9   HEMATOCRIT 42.6 47.9*   PLATELETS 312 210        AutoDiff:   Lab Results   Lab 02/07/24  1416 02/08/24  0342   NEUTROPHIL % 86.1* 69.7   LYMPHOCYTE % 7.5* 19.1*   MONOCYTES % 5.6 10.2   EOSINOPHIL % 0.0* 0.0*   BASOPHIL % 0.5 0.5   NEUTROS ABS 13.04* 7.30*   LYMPHS ABS 1.14 2.00   MONOS ABS 0.85 1.07*   EOS ABS 0.00 0.00   BASOS ABS 0.07 0.05        Manual Diff:    Lab Results   Lab 02/07/24  1416 02/08/24  0342   NEUTROS ABS 13.04* 7.30*           CMP:   Lab Results   Lab 02/07/24  1411 02/07/24  1743 02/08/24  0342   SODIUM 161* 162* 154*   POTASSIUM 4.0 3.6 3.4*   CHLORIDE 115* 119* 118*   CO2 27.0 25.0 20.0*   BUN 56* 57* 53*   CREATININE 0.97 0.96 0.91   CALCIUM 11.4* 10.7* 10.1   BILIRUBIN 0.9  --   --    ALK PHOS 62  --   --    ALT (SGPT) 13  --   --    AST (SGOT) 20  --   --    GLUCOSE 161* 171* 157*       Estimated Creatinine Clearance: 66.5 mL/min (by C-G formula based on SCr of 0.91 mg/dL).    Culture Results:    Microbiology Results (last 10 days)       Procedure Component Value - Date/Time    Miscellaneous Micro Request - Specimen Not Sent, Specimen Not Sent [[id number]] Resulted: 02/08/24 0731    Lab Status: Final result Specimen: Specimen Not Sent Updated: 02/08/24 0731     Extra Tube --    COVID-19, FLU A/B, RSV PCR 1 HR TAT - Swab, Nasopharynx [[id number]]  (Normal) Collected: 02/07/24 1215    Lab Status: Final result Specimen: Swab from Nasopharynx  Updated: 02/07/24 1340     COVID19 Not Detected     Influenza A PCR Not Detected     Influenza B PCR Not Detected     RSV, PCR Not Detected    Narrative:      Fact sheet for providers: https://www.fda.gov/media/711121/download    Fact sheet for patients: https://www.fda.gov/media/129785/download    Test performed by PCR.             01/29/2024 0413 02/08/2024 0731 Urine Culture - Urine, Indwelling Urethral Catheter [[id number]]     (Abnormal)   Urine from Indwelling Urethral Catheter    Edited Result - FINAL Component Value   Urine Culture >100,000 CFU/mL Escherichia coli ESBL Abnormal       Consider infectious disease consult.  Susceptibility results may not correlate to clinical outcomes.    >100,000 CFU/mL Proteus mirabilis Abnormal     >100,000 CFU/mL Pseudomonas aeruginosa Abnormal        Susceptibility     Escherichia coli ESBL Proteus mirabilis Pseudomonas aeruginosa     SANDEEP SANDEEP SANDEEP     Amikacin 4 Susceptible         Amoxicillin + Clavulanate   <=2 Susceptible       Ampicillin   <=2 Susceptible       Ampicillin + Sulbactam   <=2 Susceptible       Cefepime   <=1 Susceptible 8 Susceptible     Ceftazidime   <=1 Susceptible 4 Susceptible     Ceftriaxone   <=1 Susceptible       Ciprofloxacin     1 Intermediate     Ertapenem <=0.5 Susceptible <=0.5 Susceptible (C) 1       Gentamicin >=16 Resistant <=1 Susceptible       Imipenem   2 Intermediate (C) 1 <=0.25 Susceptible (C) 1     Levofloxacin >=8 Resistant 4 Resistant 4 Resistant     Meropenem <=0.25 Susceptible <=0.25 Susceptible (C) 1 <=0.25 Susceptible (C) 1     Nitrofurantoin <=16 Susceptible 128 Resistant       Piperacillin + Tazobactam <=4 Susceptible <=4 Susceptible 8 Susceptible     Tobramycin >=16 Resistant   <=1 Susceptible     Trimethoprim + Sulfamethoxazole >=320 Resistant <=20 Susceptible                    1 Appended report. These results have been appended to a previously final verified report.             Radiology:   Imaging Results (Last 72 Hours)        Procedure Component Value Units Date/Time    CT Abdomen Pelvis Without Contrast [[id number]] Collected: 02/07/24 1720     Updated: 02/07/24 1733    Narrative:      CT ABDOMEN PELVIS WO CONTRAST- 2/7/2024 3:49 PM     HISTORY: Sepsis; R50.9-Fever, unspecified; N39.0-Urinary tract  infection, site not specified; A41.9-Sepsis, unspecified organism;  E87.0-Hyperosmolality and hypernatremia      COMPARISON: 1/29/2024     DLP: 784.68 mGy.cm . All CT scans are performed using dose optimization  techniques as appropriate to the performed exam and including at least  one of the following: Automated exposure control, adjustment of the mA  and/or kV according to size, and the use of the iterative reconstruction  technique.     TECHNIQUE: Noncontrast enhanced images of the abdomen and pelvis  obtained without oral contrast. The study is degraded by motion  artifact.     FINDINGS:  Bibasilar atelectasis is present. The base of the heart is unremarkable.  There is no pericardial effusion..     LIVER: No focal liver lesion.     BILIARY SYSTEM: The gallbladder is unremarkable. No intrahepatic or  extrahepatic ductal dilatation.     PANCREAS: No focal pancreatic lesion.     SPLEEN: Unremarkable.     KIDNEYS AND ADRENALS: The adrenals are unremarkable. Bilateral  nonobstructing nephrolithiasis is present. A left-sided nephrostomy tube  appears to be well positioned within the renal pelvis. A left-sided  ureteral stent is also in place. There is progressive distention of the  upper tracts of the left kidney and proximal left ureter in comparison  to the previous exam. There is again evidence of pyeloureteritis with  periureteral stranding and thickening of the urothelium.. The right  ureter is decompressed and normal in appearance with no evidence of  ureteral calculus..     RETROPERITONEUM: No mass, lymphadenopathy or hemorrhage.     GI TRACT: A percutaneous gastrostomy feeding tube is in place. It is  well-positioned. The stomach  is moderately distended and air-filled. No  evidence of gastric wall thickening or definite gastric outlet  obstruction. There is a large volume of stool throughout the colon which  could be contributing to stasis. A left paramedian ventral wall hernia  is noted at the umbilicus. There is slight protrusion of a portion of  the transverse colon into this defect without evidence of incarceration  or obstruction. There is increased stool within the colon to just above  the level of the rectum. No fecal impaction. No significant small bowel  distention.. The appendix is not clearly identified..     OTHER: There is no mesenteric mass, lymphadenopathy or fluid collection.  The osseous structures and soft tissues demonstrate no worrisome  lesions. No free fluid is present.     PELVIS: A Mojica catheter is in place within the bladder with the urinary  bladder decompressed.. The uterus and adnexa are unremarkable. No free  fluid in the cul-de-sac..       Impression:      1. A left-sided nephrostomy tube and ureteral stent remains in place and  well-positioned. There is mild progressive distention of the upper  tracts of the left kidney as well as of the proximal and mid left ureter  when compared to the previous exam. There is associated pyeloureteritis  with urothelial thickening and stranding surrounding the left renal  pelvis and proximal ureter. Dysfunction of the indwelling stent is  suspected. The right kidney demonstrates nonobstructing nephrolithiasis.  No right-sided ureteral stone is present.  2. Large volume of stool within the colon. No fecal impaction within the  rectal vault. The stomach is moderately distended with air and fluid  with an air-fluid level but without definite gastric wall thickening or  convincing evidence of gastric outlet obstruction. No evidence of  mechanical obstruction.  3. Just to the left of midline at the umbilicus there is a small  abdominal wall defect. Slight protrusion of a portion  of the transverse  colon into this defect is present without incarceration or obstruction.  4. The uterus and adnexa are unremarkable..           This report was signed and finalized on 2/7/2024 5:30 PM by Dr. Yuniel De Jesus MD.       XR Chest 1 View [[id number]] Collected: 02/07/24 1244     Updated: 02/07/24 1250    Narrative:      EXAMINATION: XR CHEST 1 VW-  2/7/2024 12:44 PM 1 view     HISTORY: fever     COMPARISON: 1/29/2024     TECHNIQUE: A single frontal view of the chest was obtained.     FINDINGS:  This examination is limited by positioning of the patient's head over  the RIGHT upper lung/chest. This significantly limits evaluation of the  upper half of the RIGHT lung. The remainder of the lungs are clear. The  heart is not enlarged. A tracheostomy tube is in place. Some gaseous  distention of loops of bowel in the upper abdomen       Impression:         1.  Exam limited by patient positioning as the patient's head obscures  much of the RIGHT hemithorax. The remaining visualized portion of the  lungs are clear.          2.  There are gaseous distended loops of bowel in the upper abdomen.           This report was signed and finalized on 2/7/2024 12:47 PM by Dr. Taj Bergeron MD.                   Active Hospital Problems    Diagnosis     **Severe sepsis     History of anoxic brain injury     Hypernatremia     Infection associated with nephrostomy catheter     Functional quadriplegia     Dysphagia     Severe malnutrition     Complicated UTI (urinary tract infection)        IMPRESSION:  Sepsis secondary to complicated urinary tract infection.  Urine culture from January 29, 2024 positive for ESBL E. coli, Proteus mirabilis and Pseudomonas aeruginosa.  It does not appear antibiotics were changed as an outpatient to cover these organisms based on prior to admission medications still listing Keflex.  Anoxic brain injury  Presence of trach  Presence of nonfunctioning G-tube  Hypernatremia  improving  Penicillin allergy-updated information received from patient's father that her reaction was out of her rash many years ago.  Review of records reveal she has received cefepime, ceftriaxone and cephalexin here at [facility].      RECOMMENDATION:   Continue meropenem to cover both the ESBL E. coli and Pseudomonas (ertapenem does not cover Pseudomonas)-reviewed updated susceptibilities from January 29 urine culture  Await urine culture collected from Mojica catheter in ER although this is not ideal specimen.  CARLEY Breaux to change Mojica catheter and send new urine specimen from new Mojica catheter  Reviewed Dr. Dyer's note with recommendations to transfer patient to [facility] or [facility] for definitive urologic management-she has been to both facilities in the past.    Briefly discussed with Dr. Isabel at bedside.  Discussed with CARLEY Breaux MD  02/08/24  08:30 CST

## 2024-02-08 NOTE — PROGRESS NOTES
[facility] Medicine Services  INPATIENT PROGRESS NOTE    Patient Name: Namita Zabala  Date of Admission: 2/7/2024  Today's Date: 02/08/24  Length of Stay: 1  Primary Care Physician: David Lara MD    Subjective   Chief Complaint: Fevers  HPI   48-year-old female with history of anoxic brain injury, spastic quadriplegia, percutaneous endoscopic gastrostomy tube placement, severe malnutrition, frequent urinary tract infections with secondary sepsis, was admitted to the hospital December 2023 and treated with broad-spectrum antibiotics; history of mitral valve prolapse, nephrolithiasis, with a left percutaneous nephroureteral drain in place, last exchanged documented on January 2023.      Patient came from [facility]; as per family member patient has had a malfunctioning percutaneous gastrostomy tube for several days, and approximately 3 days ago developed fevers.  There is no other information available at this time.  Not able to provide any history due to neurological condition.    She was admitted to intensive care unit.  Started on broad-spectrum antibiotics.  Today, evaluated with multidisciplinary team, including neurology, infectious disease, nursing staff.  It was reported that the gastrostomy tube is obstructed and not functional.  Patient has received IV fluids.  Unable to obtain history from patient still, no family member present.  Still tachycardic, sinus.  No ventilatory support.  No pressor support.        Review of Systems   All pertinent negatives and positives are as above. All other systems have been reviewed and are negative unless otherwise stated.     Objective    Temp:  [99 °F (37.2 °C)-103.4 °F (39.7 °C)] 99.9 °F (37.7 °C)  Heart Rate:  [120-156] 135  Resp:  [18-32] 21  BP: (107-133)/(63-99) 132/98  Physical Exam  Constitutional:       Appearance: Chronically ill-appearing.  Multiple contractures.  HENT:      Head:  Normocephalic and atraumatic.      Nose: Nose normal.      Mouth/Throat:      Mouth: Mucous membranes are dry.     Pharynx: Able to evaluate  Eyes:      Extraocular Movements: Extraocular movements preserved     Conjunctiva/sclera: Conjunctivae normal.      Pupils: Pupils are equal, round, and reactive to light.   Cardiovascular:      Rate and Rhythm: Tachycardic, normal rate and regular rhythm.      Pulses: Fast pulse  Pulmonary:      Effort: No tachypnea.     Breath sounds: Reduced breath sounds on the right due to positioning.  Abdominal:      General: There is a PEG tube in place.  Abdominal scar extensive, with no open wounds there is a percutaneous gastrostomy tube in place.  Abdomen is nondistended.  Bowel sounds are diminished.     Palpations: Abdomen is soft.      Tenderness: There is no guarding or rebound.   Musculoskeletal:         General: Multiple upper and lower extremity flexion contractures; decreased range of motion.    Extremities: Multiple contractures as per musculoskeletal exam; no lower extremity edema.  Skin:     Capillary Refill: Capillary refill takes less than 2 seconds.      Coloration: Skin is not jaundiced.      Findings: No rash.   Neurological:      General: Spastic quadriplegia.  I am not able to assess patient's orientation or memory.  Speech:  with aphasia.   Psychiatric evaluation not able to be performed.      Results Review:  I have reviewed the labs, radiology results, and diagnostic studies.    Laboratory Data:   Results from last 7 days   Lab Units 02/08/24  0342 02/07/24  1416   WBC 10*3/mm3 10.47 15.14*   HEMOGLOBIN g/dL 12.9 13.3   HEMATOCRIT % 47.9* 42.6   PLATELETS 10*3/mm3 210 312        Results from last 7 days   Lab Units 02/08/24  0342 02/07/24  1743 02/07/24  1411   SODIUM mmol/L 154* 162* 161*   POTASSIUM mmol/L 3.4* 3.6 4.0   CHLORIDE mmol/L 118* 119* 115*   CO2 mmol/L 20.0* 25.0 27.0   BUN mg/dL 53* 57* 56*   CREATININE mg/dL 0.91 0.96 0.97   CALCIUM mg/dL 10.1  10.7* 11.4*   BILIRUBIN mg/dL  --   --  0.9   ALK PHOS U/L  --   --  62   ALT (SGPT) U/L  --   --  13   AST (SGOT) U/L  --   --  20   GLUCOSE mg/dL 157* 171* 161*       Culture Data:   Blood Culture   Date Value Ref Range Status   02/07/2024 Abnormal Stain (C)  Preliminary       Radiology Data:   Imaging Results (Last 24 Hours)       Procedure Component Value Units Date/Time    CT Abdomen Pelvis Without Contrast [[id number]] Collected: 02/07/24 1720     Updated: 02/07/24 1733    Narrative:      CT ABDOMEN PELVIS WO CONTRAST- 2/7/2024 3:49 PM     HISTORY: Sepsis; R50.9-Fever, unspecified; N39.0-Urinary tract  infection, site not specified; A41.9-Sepsis, unspecified organism;  E87.0-Hyperosmolality and hypernatremia      COMPARISON: 1/29/2024     DLP: 784.68 mGy.cm . All CT scans are performed using dose optimization  techniques as appropriate to the performed exam and including at least  one of the following: Automated exposure control, adjustment of the mA  and/or kV according to size, and the use of the iterative reconstruction  technique.     TECHNIQUE: Noncontrast enhanced images of the abdomen and pelvis  obtained without oral contrast. The study is degraded by motion  artifact.     FINDINGS:  Bibasilar atelectasis is present. The base of the heart is unremarkable.  There is no pericardial effusion..     LIVER: No focal liver lesion.     BILIARY SYSTEM: The gallbladder is unremarkable. No intrahepatic or  extrahepatic ductal dilatation.     PANCREAS: No focal pancreatic lesion.     SPLEEN: Unremarkable.     KIDNEYS AND ADRENALS: The adrenals are unremarkable. Bilateral  nonobstructing nephrolithiasis is present. A left-sided nephrostomy tube  appears to be well positioned within the renal pelvis. A left-sided  ureteral stent is also in place. There is progressive distention of the  upper tracts of the left kidney and proximal left ureter in comparison  to the previous exam. There is again evidence of  pyeloureteritis with  periureteral stranding and thickening of the urothelium.. The right  ureter is decompressed and normal in appearance with no evidence of  ureteral calculus..     RETROPERITONEUM: No mass, lymphadenopathy or hemorrhage.     GI TRACT: A percutaneous gastrostomy feeding tube is in place. It is  well-positioned. The stomach is moderately distended and air-filled. No  evidence of gastric wall thickening or definite gastric outlet  obstruction. There is a large volume of stool throughout the colon which  could be contributing to stasis. A left paramedian ventral wall hernia  is noted at the umbilicus. There is slight protrusion of a portion of  the transverse colon into this defect without evidence of incarceration  or obstruction. There is increased stool within the colon to just above  the level of the rectum. No fecal impaction. No significant small bowel  distention.. The appendix is not clearly identified..     OTHER: There is no mesenteric mass, lymphadenopathy or fluid collection.  The osseous structures and soft tissues demonstrate no worrisome  lesions. No free fluid is present.     PELVIS: A Mojica catheter is in place within the bladder with the urinary  bladder decompressed.. The uterus and adnexa are unremarkable. No free  fluid in the cul-de-sac..       Impression:      1. A left-sided nephrostomy tube and ureteral stent remains in place and  well-positioned. There is mild progressive distention of the upper  tracts of the left kidney as well as of the proximal and mid left ureter  when compared to the previous exam. There is associated pyeloureteritis  with urothelial thickening and stranding surrounding the left renal  pelvis and proximal ureter. Dysfunction of the indwelling stent is  suspected. The right kidney demonstrates nonobstructing nephrolithiasis.  No right-sided ureteral stone is present.  2. Large volume of stool within the colon. No fecal impaction within the  rectal  vault. The stomach is moderately distended with air and fluid  with an air-fluid level but without definite gastric wall thickening or  convincing evidence of gastric outlet obstruction. No evidence of  mechanical obstruction.  3. Just to the left of midline at the umbilicus there is a small  abdominal wall defect. Slight protrusion of a portion of the transverse  colon into this defect is present without incarceration or obstruction.  4. The uterus and adnexa are unremarkable..           This report was signed and finalized on 2/7/2024 5:30 PM by Dr. Yuniel De Jesus MD.       XR Chest 1 View [[id number]] Collected: 02/07/24 1244     Updated: 02/07/24 1250    Narrative:      EXAMINATION: XR CHEST 1 VW-  2/7/2024 12:44 PM 1 view     HISTORY: fever     COMPARISON: 1/29/2024     TECHNIQUE: A single frontal view of the chest was obtained.     FINDINGS:  This examination is limited by positioning of the patient's head over  the RIGHT upper lung/chest. This significantly limits evaluation of the  upper half of the RIGHT lung. The remainder of the lungs are clear. The  heart is not enlarged. A tracheostomy tube is in place. Some gaseous  distention of loops of bowel in the upper abdomen       Impression:         1.  Exam limited by patient positioning as the patient's head obscures  much of the RIGHT hemithorax. The remaining visualized portion of the  lungs are clear.          2.  There are gaseous distended loops of bowel in the upper abdomen.           This report was signed and finalized on 2/7/2024 12:47 PM by Dr. Taj Bergeron MD.               I have reviewed the patient's current medications.     Assessment/Plan   Assessment  Active Hospital Problems    Diagnosis     **Severe sepsis     History of anoxic brain injury     Hypernatremia     Infection associated with nephrostomy catheter     Functional quadriplegia     Dysphagia     Severe malnutrition     Complicated UTI (urinary tract infection)      Severe  sepsis  Recurrent urinary tract infection, complicated, associated to nephrostomy tube/indwelling catheter/ureteral stent  Hypernatremia  Mild hypokalemia  History of anoxic brain injury 2022  Percutaneous gastrostomy tube malfunction  Chronic left nephrostomy tube in place  Spastic quadriplegia  History of pulmonary embolism in 2021  History of COVID-19 in 2021        Sodium improved from 162 to 154.    Potassium 3.6 to 3.4.  BUN 57-53.    Creatinine 0.91.  Glucose 157.    White blood cell count improved from 15,000 to 10,400.  Hemoglobin stable at 12.9.  Platelet count 210,000     Last urine culture from January 29, 2024:  E. coli ESBL sensitive to ertapenem and meropenem  Proteus mirabilis resistant to quinolones and nitrofurantoin  Pseudomonas resistant to Levaquin.      CT scan report  1. A left-sided nephrostomy tube and ureteral stent remains in place and  well-positioned. There is mild progressive distention of the upper  tracts of the left kidney as well as of the proximal and mid left ureter  when compared to the previous exam. There is associated pyeloureteritis  with urothelial thickening and stranding surrounding the left renal  pelvis and proximal ureter. Dysfunction of the indwelling stent is  suspected. The right kidney demonstrates nonobstructing nephrolithiasis.  No right-sided ureteral stone is present.  2. Large volume of stool within the colon. No fecal impaction within the  rectal vault. The stomach is moderately distended with air and fluid  with an air-fluid level but without definite gastric wall thickening or  convincing evidence of gastric outlet obstruction. No evidence of  mechanical obstruction.  3. Just to the left of midline at the umbilicus there is a small  abdominal wall defect. Slight protrusion of a portion of the transverse  colon into this defect is present without incarceration or obstruction.  4. The uterus and adnexa are unremarkable..         Treatment Plan  Continue  meropenem IV.  Pharmacy to adjust dose.    IV fluids will be changed to D5 half-normal saline +20 mill equivalents of KCl, 125 MLS per hour.      Gastroenterology consultation for possible percutaneous gastrostomy tube exchange.    Nutritional evaluation.     Follow blood and urine cultures.  Follow electrolytes    Mojica catheter will be exchanged today.  Nurse informed.    Urology consult noted.  Patient will need transfer for left nephroureteral stent exchange.     Heparin subcutaneous for DVT prophylaxis.  Prognosis is poor given functional status.  Palliative care evaluation.    Medical Decision Making  Number and Complexity of problems: 10, high complexity  Differential Diagnosis: See above    Conditions and Status        Condition is improving.     MDM Data  External documents reviewed: None  Cardiac tracing (EKG, telemetry) interpretation: Reviewed, sinus tachycardia  Radiology interpretation: Radiology reports reviewed  Labs reviewed: Yes  Any tests that were considered but not ordered: No     Decision rules/scores evaluated (example [id number], Wells, etc): None     Discussed with: Nurse staff and specialists     Care Planning  Shared decision making: With family  Code status and discussions: No chest compressions.    Disposition  Social Determinants of Health that impact treatment or disposition: Nursing home.  Bedbound.  I expect the patient to be transferred for nephroureteral stent exchange.    Electronically signed by Tae Charles MD, 02/08/24, 09:00 CST.

## 2024-02-08 NOTE — PROCEDURES
PEG tube replacement    Indications: Clogged/malfunctioning PEG tube    Post procedure diagnosis: Successful replacement of 18 [place] balloontipped percutaneous endoscopic gastrostomy tube    Findings: The old PEG tube was removed after removal of 10 to 15 cc of fluid from the balloon.  An 18 [place] balloontipped PEG tube was then easily replaced and the balloon was inflated with 15 to 17 cc of normal saline.  The external bolster was 5 cm at skin level.    Impressions: Successful replacement of 18 [place] balloontipped percutaneous endoscopic gastrostomy tube    Plans: Would suggest Gastrografin injection through the PEG tube into radiology to document correct placement of the PEG tube prior to using medicines or tube feedings through the PEG tube.  Recommend head of bed elevated greater than 35 degrees at all times during tube feedings.  Wash PEG site with soap and water every shift and flush PEG tube with 30 cc of water every shift.  The inpatient GI service remains available should any urgent endoscopic intervention be necessary.  The inpatient GI service will sign off at this time.  Please call with any questions or concerns.  The above was discussed in detail with the patient's ICU nursing staff.  Thank you

## 2024-02-08 NOTE — PLAN OF CARE
Goal Outcome Evaluation:      Physical Therapy: Consult received. Presents with eyes closed, no response to voice or with PROM and she did not follow any commands. B hip/knee into significant flexion contractures. B ankle fully contracted in plantarflexion. Discussed positioning and waffle boots with RN. She is dependent for all care,  PT to sign off and will defer positioning to nursing.

## 2024-02-08 NOTE — CONSULTS
[facility]  Inpatient Consult Note  Today's date:  02/08/24    Namita Zabala  1975       Referring Provider: Dea Lopez MD  Primary Physician: David Lara MD   Primary Gastroenterologist: Unknown    Date of Admission: 2/7/2024  Date of Service:  02/08/24    Reason for Consultation/Chief Complaint: Clogged PEG tube    History of present illness: 48-year-old patient with a tracheostomy and is status post anoxic brain injury who were asked to see for malfunctioning of percutaneous endoscopic gastrostomy tube and the tube not working or flushing.  The patient is unable to give a history and is a history is obtained per chart review and nursing staff interview.  We cannot locate an endoscopy report for PEG tube placement although the PEG tube appears to be an 18 [place] SANDEEP balloontipped PEG tube.    Past Medical History:   Diagnosis Date    Acute kidney failure, unspecified     Angina at rest     Anoxic brain damage, not elsewhere classified     Chronic pulmonary embolism     Chronic respiratory failure, unspecified whether with hypoxia or hypercapnia     Dependence on respirator (ventilator) status     Gastrointestinal hemorrhage, unspecified     Hypercalcemia     Iron deficiency anemia     Metabolic encephalopathy     Migraine     Sees Pain Management for injections.     MVP (mitral valve prolapse)     Other dysphagia     Other pulmonary embolism with acute cor pulmonale     Renal disorder     Ruptured bladder     Syncope     Urinary tract infection, site not specified        Past Surgical History:   Procedure Laterality Date    ABDOMINAL SURGERY      BREAST BIOPSY Right 2011    benign    GTUBE INSERTION      INSERTION HEMODIALYSIS CATHETER Left 09/16/2021    Procedure: HEMODIALYSIS CATHETER INSERTION;  Surgeon: Anders Kaur MD;  Location: [place] [address];  Service: Vascular;  Laterality: Left;    TONSILLECTOMY      TRACHEOSTOMY          Allergies    Allergen Reactions    Coconut Unknown - High Severity    Levaquin [Levofloxacin] Other (See Comments)     unknown    Nuts Unknown - High Severity    Penicillins     [place] Other (See Comments)     Causes migraines per pt reports       Medications Prior to Admission   Medication Sig Dispense Refill Last Dose    acetaminophen (TYLENOL) 500 MG tablet Administer 1 tablet per G tube Every 4 (Four) Hours As Needed for Fever. Not to exceed 3000mg from all sources daily       baclofen (LIORESAL) 20 MG tablet Administer 1 tablet per G tube Every 6 (Six) Hours.       bisacodyl (DULCOLAX) 10 MG suppository Insert 1 suppository into the rectum Every Other Day As Needed for Constipation.       Carboxymethylcellul-Glycerin (Refresh Optive) 0.5-0.9 % solution Apply 1 drop to eye(s) as directed by provider 2 (Two) Times a Day. Bilateral eyes       cephalexin (KEFLEX) 500 MG capsule Take 1 capsule by mouth 2 (Two) Times a Day.       chlorhexidine (PERIDEX) 0.12 % solution Apply 15 mL to the mouth or throat 2 (Two) Times a Day. 15 ml using oral swab twice daily for oral care       Citric Ge-Vqthymnrqsn-Pk Carb (Renacidin) solution Irrigate with 60 mL to the affected area as directed by provider 2 (Two) Times a Day.       guaifenesin (ROBITUSSIN) 100 MG/5ML liquid Take 10 mL by mouth 2 (Two) Times a Day.       HYDROcodone-acetaminophen (NORCO) 5-325 MG per tablet Take 1 tablet by mouth Every 6 (Six) Hours As Needed for Moderate Pain. (Patient taking differently: Take 1 tablet by mouth Every 4 (Four) Hours As Needed for Moderate Pain.) 24 tablet 0     hydrOXYzine (ATARAX) 25 MG tablet Administer 1 tablet per G tube Every 6 (Six) Hours As Needed for Itching.       liver oil-zinc oxide (DESITIN) 40 % ointment Apply 1 application  topically to the appropriate area as directed Every 4 (Four) Hours As Needed for Irritation.       metoprolol tartrate (LOPRESSOR) 50 MG tablet Take 1 tablet by mouth 2 (Two) Times a Day.       miconazole  (MICOTIN) 2 % powder Apply 1 Application topically to the appropriate area as directed As Needed for Itching.       multivitamin with minerals tablet tablet Take 1 tablet by mouth Daily.       nystatin (MYCOSTATIN) 375131 UNIT/GM powder Apply 1 Application topically to the appropriate area as directed 2 (Two) Times a Day.       ondansetron (ZOFRAN) 4 MG/5ML solution Take 5 mL by mouth Every 6 (Six) Hours As Needed for Nausea or Vomiting.       pantoprazole (PROTONIX) 40 MG EC tablet Take 1 tablet by mouth 2 (Two) Times a Day.       polyethylene glycol (MIRALAX) 17 GM/SCOOP powder Administer 17 g per G tube Every Morning.       rosuvastatin (CRESTOR) 5 MG tablet Administer 1 tablet per G tube Daily.       Scopolamine 1 MG/3DAYS patch Place 1 patch on the skin as directed by provider Every 72 (Seventy-Two) Hours.       sennosides-docusate (senna-docusate sodium) 8.6-50 MG per tablet Take 1 tablet by mouth 2 (Two) Times a Day.       simethicone (MYLICON) 40 MG/0.6ML drops Administer 0.3 mL per G tube Every 6 (Six) Hours.       sodium chloride (NS) 0.9 % irrigation Irrigate with 10 mL to the affected area as directed by provider Every 8 (Eight) Hours. Irrigation for secretions: Hydration       Sodium Phosphates (fleet enema) 7-19 GM/118ML enema Insert 1 enema into the rectum Every Other Day As Needed (bowel management).       thiamine (VITAMIN B-1) 100 MG tablet  tablet Administer 1 tablet per G tube Every Morning. (Patient not taking: Reported on 2/7/2024)   Not Taking    tiZANidine (ZANAFLEX) 4 MG tablet Take 2 tablets by mouth Every 8 (Eight) Hours As Needed for Muscle Spasms.          Hospital Medications (active)         Dose Frequency Start End    acetaminophen (TYLENOL) suppository 325 mg 325 mg Every 4 Hours PRN 2/7/2024 --    Admin Instructions: If given for fever, use fever parameter: fever greater than 100.4 °F  Based on patient request - if ordered for moderate or severe pain, provider allows for  administration of a medication prescribed for a lower pain scale.    Do not exceed 4 grams of acetaminophen in a 24 hr period. Max dose of 2gm for AST/ALT greater than 120 units/L.    If given for pain, use the following pain scale:   Mild Pain = Pain Score of 1-3, CPOT 1-2  Moderate Pain = Pain Score of 4-6, CPOT 3-4  Severe Pain = Pain Score of 7-10, CPOT 5-8    Route: Rectal    albuterol (PROVENTIL) nebulizer solution 0.083% 2.5 mg/3mL 2.5 mg Every 4 Hours PRN 2/7/2024 --    Admin Instructions: Include Respiratory Treatment Education    Route: Nebulization    artificial tears ophthalmic ointment  Every 1 Hour PRN 2/7/2024 --    Route: Both Eyes    baclofen (LIORESAL) tablet 10 mg 10 mg Every 6 Hours 2/7/2024 --    Admin Instructions: Take with food if GI upset occurs.    Route: Per G Tube    bisacodyl (DULCOLAX) EC tablet 5 mg 5 mg Daily PRN 2/7/2024 --    Admin Instructions: Use if no bowel movement after 12 hours.  Swallow whole. Do not crush, split, or chew tablet.    Route: Oral    Linked Group 1: See Hyperspace for full Linked Orders Report.        bisacodyl (DULCOLAX) suppository 10 mg 10 mg Daily PRN 2/7/2024 --    Admin Instructions: Use if no bowel movement after 12 hours.  Hold for diarrhea    Route: Rectal    Linked Group 1: See Hyperspace for full Linked Orders Report.        dextrose 5 % and sodium chloride 0.45 % with KCl 20 mEq/L infusion 125 mL/hr Continuous 2/8/2024 --    Route: Intravenous    famotidine (PEPCID) injection 20 mg 20 mg Every 12 Hours Scheduled 2/7/2024 --    Admin Instructions: Give IV push over 2 minutes.    Route: Intravenous    heparin (porcine) 5000 UNIT/ML injection 5,000 Units 5,000 Units Every 12 Hours Scheduled 2/7/2024 --    Route: Subcutaneous    hydrOXYzine (ATARAX) tablet 25 mg 25 mg Every 6 Hours PRN 2/7/2024 --    Admin Instructions: Caution: Look alike/sound alike drug alert    Route: Per G Tube    meropenem (MERREM) 1,000 mg in sodium chloride 0.9 % 100 mL IVPB  1,000 mg Every 8 Hours 2/7/2024 2/14/2024    Route: Intravenous    Morphine sulfate (PF) injection 1 mg 1 mg Every 4 Hours PRN 2/7/2024 2/12/2024    Admin Instructions: Based on patient request - if ordered for moderate or severe pain, provider allows for administration of a medication prescribed for a lower pain scale.  Unknown    Caution: Look alike/sound alike drug alert    If given for pain, use the following pain scale:  Mild Pain = Pain Score of 1-3, CPOT 1-2  Moderate Pain = Pain Score of 4-6, CPOT 3-4  Severe Pain = Pain Score of 7-10, CPOT 5-8    Route: Intravenous    multivitamin with minerals 1 tablet 1 tablet Daily 2/8/2024 --    Admin Instructions: Unknown    Route: Oral    ondansetron (ZOFRAN) injection 4 mg 4 mg Every 6 Hours PRN 2/7/2024 --    Admin Instructions: If BOTH ondansetron (ZOFRAN) and promethazine (PHENERGAN) are ordered use ondansetron first and THEN promethazine IF ondansetron is ineffective.    Route: Intravenous    Pharmacy to Dose meropenem (MERREM)  Continuous PRN 2/7/2024 2/14/2024    Route: Does not apply    polyethylene glycol (MIRALAX) packet 17 g 17 g Daily PRN 2/7/2024 --    Admin Instructions: Use if no bowel movement after 12 hours. Mix in 6-8 ounces of water.  Use 4-8 ounces of water, tea, or juice for each 17 gram dose.    Route: Oral    Linked Group 1: See Hyperspace for full Linked Orders Report.        saccharomyces boulardii (FLORASTOR) capsule 250 mg 250 mg Daily 2/8/2024 --    Route: Per G Tube    sennosides-docusate (PERICOLACE) 8.6-50 MG per tablet 2 tablet 2 tablet 2 Times Daily PRN 2/7/2024 --    Admin Instructions: Start bowel management regimen if patient has not had a bowel movement after 12 hours.    Route: Oral    Linked Group 1: See Hyperspace for full Linked Orders Report.        sodium chloride 0.9 % flush 10 mL 10 mL As Needed 2/7/2024 --    Route: Intravenous    Linked Group 2: See Hyperspace for full Linked Orders Report.        sodium chloride 0.9 %  flush 10 mL 10 mL Every 12 Hours Scheduled 2/7/2024 --    Route: Intravenous    sodium chloride 0.9 % flush 10 mL 10 mL As Needed 2/7/2024 --    Route: Intravenous    sodium chloride 0.9 % infusion 40 mL 40 mL As Needed 2/7/2024 --    Admin Instructions: Following administration of an IV intermittent medication, flush line with 40mL NS at 100mL/hr.    Route: Intravenous            Social History     Tobacco Use    Smoking status: Never    Smokeless tobacco: Never   Substance Use Topics    Alcohol use: No        Past Family History:  Family History   Problem Relation Age of Onset    Colon cancer Maternal Aunt 52    Fibromyalgia Mother     Heart disease Mother     Hypertension Mother     Hodgkin's lymphoma Paternal Grandmother     Ovarian cancer Neg Hx     Breast cancer Neg Hx        Review of Systems:  Unobtainable      Physical Exam:  Temp:  [99 °F (37.2 °C)-103.4 °F (39.7 °C)] 99.9 °F (37.7 °C)  Heart Rate:  [120-156] 135  Resp:  [18-32] 21  BP: (107-133)/(63-99) 132/98  Body mass index is 23.47 kg/m².    Intake/Output Summary (Last 24 hours) at 2/8/2024 0848  Last data filed at 2/8/2024 0400  Gross per 24 hour   Intake --   Output 750 ml   Net -750 ml     No intake/output data recorded.    General appearance:   HEENT: Nonicteric sclerae.  Moist oral mucosa.  PERRLA.  EOMI.  Clear pharynx.  Tracheostomy  Lungs: Clear to auscultation bilaterally.  No wheezing, rales or rhonchi.  Heart: Regular rate and rhythm.  Normal S1 and S2, no S3, S4 or murmur.  Abdomen: Soft, nondistended, nontender to palpation, with normoactive bowel sounds, no hepatosplenomegaly, no palpable masses.  Significant scarring of the lower abdomen, presumed balloon tipped SANDEEP tube PEG tube in place  Extremities: Multiple extremity contractures  Skin: No rash or jaundice.  Neuro: The patient is not responsive to verbal stimuli  Results Review:  Lab Results (last 24 hours)       Procedure Component Value Units Date/Time    Miscellaneous Micro  Request - Specimen Not Sent, Specimen Not Sent [[id number]] Resulted: 02/08/24 0731    Specimen: Specimen Not Sent Updated: 02/08/24 0731     Extra Tube --    CBC & Differential [[id number]]  (Abnormal) Collected: 02/08/24 0342    Specimen: Blood Updated: 02/08/24 0447    Narrative:      The following orders were created for panel order CBC & Differential.  Procedure                               Abnormality         Status                     ---------                               -----------         ------                     CBC Auto Differential[[id number]]        Abnormal            Final result                 Please view results for these tests on the individual orders.    CBC Auto Differential [[id number]]  (Abnormal) Collected: 02/08/24 0342    Specimen: Blood Updated: 02/08/24 0447     WBC 10.47 10*3/mm3      RBC 4.11 10*6/mm3      Hemoglobin 12.9 g/dL      Hematocrit 47.9 %      .5 fL      MCH 31.4 pg      MCHC 26.9 g/dL      RDW 15.7 %      RDW-SD 69.3 fl      MPV 11.5 fL      Platelets 210 10*3/mm3      Neutrophil % 69.7 %      Lymphocyte % 19.1 %      Monocyte % 10.2 %      Eosinophil % 0.0 %      Basophil % 0.5 %      Neutrophils, Absolute 7.30 10*3/mm3      Lymphocytes, Absolute 2.00 10*3/mm3      Monocytes, Absolute 1.07 10*3/mm3      Eosinophils, Absolute 0.00 10*3/mm3      Basophils, Absolute 0.05 10*3/mm3     Basic Metabolic Panel [[id number]]  (Abnormal) Collected: 02/08/24 0342    Specimen: Blood Updated: 02/08/24 0434     Glucose 157 mg/dL      BUN 53 mg/dL      Creatinine 0.91 mg/dL      Sodium 154 mmol/L      Potassium 3.4 mmol/L      Comment: Slight hemolysis detected by analyzer. Result may be falsely elevated.        Chloride 118 mmol/L      CO2 20.0 mmol/L      Calcium 10.1 mg/dL      BUN/Creatinine Ratio 58.2     Anion Gap 16.0 mmol/L      eGFR 79.0 mL/min/1.73     Narrative:      GFR Normal >60  Chronic Kidney Disease <60  Kidney Failure <15      Magnesium [[id number]]  (Abnormal)  Collected: 02/08/24 0342    Specimen: Blood Updated: 02/08/24 0434     Magnesium 2.7 mg/dL     Phosphorus [[id number]]  (Normal) Collected: 02/08/24 0342    Specimen: Blood Updated: 02/08/24 0434     Phosphorus 3.3 mg/dL     Basic Metabolic Panel [[id number]]  (Abnormal) Collected: 02/07/24 1743    Specimen: Blood Updated: 02/07/24 1820     Glucose 171 mg/dL      BUN 57 mg/dL      Creatinine 0.96 mg/dL      Sodium 162 mmol/L      Potassium 3.6 mmol/L      Chloride 119 mmol/L      CO2 25.0 mmol/L      Calcium 10.7 mg/dL      BUN/Creatinine Ratio 59.4     Anion Gap 18.0 mmol/L      eGFR 74.0 mL/min/1.73     Narrative:      GFR Normal >60  Chronic Kidney Disease <60  Kidney Failure <15      Magnesium [[id number]]  (Abnormal) Collected: 02/07/24 1743    Specimen: Blood Updated: 02/07/24 1820     Magnesium 2.7 mg/dL     STAT Lactic Acid, Reflex [[id number]]  (Normal) Collected: 02/07/24 1743    Specimen: Blood Updated: 02/07/24 1814     Lactate 2.0 mmol/L     Phosphorus [[id number]]  (Normal) Collected: 02/07/24 1743    Specimen: Blood Updated: 02/07/24 1814     Phosphorus 3.0 mg/dL     Protime-INR [[id number]]  (Abnormal) Collected: 02/07/24 1411    Specimen: Blood Updated: 02/07/24 1517     Protime 14.3 Seconds      INR 1.10    aPTT [[id number]]  (Normal) Collected: 02/07/24 1411    Specimen: Blood Updated: 02/07/24 1517     PTT 28.0 seconds     Blood Culture - Blood, Hand, Digit Right [[id number]] Collected: 02/07/24 1420    Specimen: Blood from Hand, Digit Right Updated: 02/07/24 1451    Comprehensive Metabolic Panel [[id number]]  (Abnormal) Collected: 02/07/24 1411    Specimen: Blood Updated: 02/07/24 1449     Glucose 161 mg/dL      BUN 56 mg/dL      Creatinine 0.97 mg/dL      Sodium 161 mmol/L      Potassium 4.0 mmol/L      Comment: Slight hemolysis detected by analyzer. Result may be falsely elevated.        Chloride 115 mmol/L      CO2 27.0 mmol/L      Calcium 11.4 mg/dL      Total Protein 8.8 g/dL      Albumin 4.9 g/dL   "    ALT (SGPT) 13 U/L      AST (SGOT) 20 U/L      Comment: Slight hemolysis detected by analyzer. Result may be falsely elevated.        Alkaline Phosphatase 62 U/L      Total Bilirubin 0.9 mg/dL      Globulin 3.9 gm/dL      A/G Ratio 1.3 g/dL      BUN/Creatinine Ratio 57.7     Anion Gap 19.0 mmol/L      eGFR 73.1 mL/min/1.73     Narrative:      GFR Normal >60  Chronic Kidney Disease <60  Kidney Failure <15      Lactic Acid, Plasma [[id number]]  (Abnormal) Collected: 02/07/24 1411    Specimen: Blood Updated: 02/07/24 1448     Lactate 2.4 mmol/L     Procalcitonin [[id number]]  (Abnormal) Collected: 02/07/24 1411    Specimen: Blood Updated: 02/07/24 1445     Procalcitonin 0.42 ng/mL     Narrative:      As a Marker for Sepsis (Non-Neonates):    1. <0.5 ng/mL represents a low risk of severe sepsis and/or septic shock.  2. >2 ng/mL represents a high risk of severe sepsis and/or septic shock.    As a Marker for Lower Respiratory Tract Infections that require antibiotic therapy:    PCT on Admission    Antibiotic Therapy       6-12 Hrs later    >0.5                Strongly Recommended  >0.25 - <0.5        Recommended   0.1 - 0.25          Discouraged              Remeasure/reassess PCT  <0.1                Strongly Discouraged     Remeasure/reassess PCT    As 28 day mortality risk marker: \"Change in Procalcitonin Result\" (>80% or <=80%) if Day 0 (or Day 1) and Day 4 values are available. Refer to http://www.[facility]-pct-calculator.com    Change in PCT <=80%  A decrease of PCT levels below or equal to 80% defines a positive change in PCT test result representing a higher risk for 28-day all-cause mortality of patients diagnosed with severe sepsis for septic shock.    Change in PCT >80%  A decrease of PCT levels of more than 80% defines a negative change in PCT result representing a lower risk for 28-day all-cause mortality of patients diagnosed with severe sepsis or septic shock.       C-reactive Protein [[id number]]  (Normal) " Collected: 02/07/24 1411    Specimen: Blood Updated: 02/07/24 1442     C-Reactive Protein <0.30 mg/dL     BNP [[id number]]  (Abnormal) Collected: 02/07/24 1411    Specimen: Blood Updated: 02/07/24 1437     proBNP 1,058.0 pg/mL     Narrative:      This assay is used as an aid in the diagnosis of individuals suspected of having heart failure. It can be used as an aid in the diagnosis of acute decompensated heart failure (ADHF) in patients presenting with signs and symptoms of ADHF to the emergency department (ED). In addition, NT-proBNP of <300 pg/mL indicates ADHF is not likely.    Age Range Result Interpretation  NT-proBNP Concentration (pg/mL:      <50             Positive            >450                   Gray                 300-450                    Negative             <300    50-75           Positive            >900                  Gray                300-900                  Negative            <300      >75             Positive            >1800                  Gray                300-1800                  Negative            <300    Sedimentation Rate [[id number]]  (Abnormal) Collected: 02/07/24 1416    Specimen: Blood Updated: 02/07/24 1427     Sed Rate 108 mm/hr     CBC & Differential [[id number]]  (Abnormal) Collected: 02/07/24 1416    Specimen: Blood Updated: 02/07/24 1419    Narrative:      The following orders were created for panel order CBC & Differential.  Procedure                               Abnormality         Status                     ---------                               -----------         ------                     CBC Auto Differential[[id number]]        Abnormal            Final result                 Please view results for these tests on the individual orders.    CBC Auto Differential [[id number]]  (Abnormal) Collected: 02/07/24 1416    Specimen: Blood Updated: 02/07/24 1419     WBC 15.14 10*3/mm3      RBC 4.23 10*6/mm3      Hemoglobin 13.3 g/dL      Hematocrit 42.6 %      .7 fL       MCH 31.4 pg      MCHC 31.2 g/dL      RDW 15.4 %      RDW-SD 56.6 fl      MPV 11.8 fL      Platelets 312 10*3/mm3      Neutrophil % 86.1 %      Lymphocyte % 7.5 %      Monocyte % 5.6 %      Eosinophil % 0.0 %      Basophil % 0.5 %      Immature Grans % 0.3 %      Neutrophils, Absolute 13.04 10*3/mm3      Lymphocytes, Absolute 1.14 10*3/mm3      Monocytes, Absolute 0.85 10*3/mm3      Eosinophils, Absolute 0.00 10*3/mm3      Basophils, Absolute 0.07 10*3/mm3      Immature Grans, Absolute 0.04 10*3/mm3      nRBC 0.0 /100 WBC     COVID-19, FLU A/B, RSV PCR 1 HR TAT - Swab, Nasopharynx [[id number]]  (Normal) Collected: 02/07/24 1215    Specimen: Swab from Nasopharynx Updated: 02/07/24 1340     COVID19 Not Detected     Influenza A PCR Not Detected     Influenza B PCR Not Detected     RSV, PCR Not Detected    Narrative:      Fact sheet for providers: https://www.fda.gov/media/451879/download    Fact sheet for patients: https://www.fda.gov/media/202654/download    Test performed by PCR.    Urinalysis With Culture If Indicated - Urine, Catheter [[id number]]  (Abnormal) Collected: 02/07/24 1218    Specimen: Urine, Catheter Updated: 02/07/24 1305     Color, UA Dark Yellow     Appearance, UA Turbid     pH, UA 7.0     Specific Gravity, UA 1.024     Glucose, UA Negative     Ketones, UA 15 mg/dL (1+)     Bilirubin, UA Negative     Blood, UA Large (3+)     Protein, UA >=300 mg/dL (3+)     Leuk Esterase, UA Large (3+)     Nitrite, UA Negative     Urobilinogen, UA 1.0 E.U./dL    Narrative:      In absence of clinical symptoms, the presence of pyuria, bacteria, and/or nitrites on the urinalysis result does not correlate with infection.    Urinalysis, Microscopic Only - Urine, Catheter [[id number]]  (Abnormal) Collected: 02/07/24 1218    Specimen: Urine, Catheter Updated: 02/07/24 1305     RBC, UA 6-10 /HPF      WBC, UA 21-50 /HPF      Bacteria, UA 3+ /HPF      Squamous Epithelial Cells, UA 3-6 /HPF      Hyaline Casts, UA 0-2 /LPF       Methodology Manual Light Microscopy    Urine Culture - Urine, Urine, Catheter [[id number]] Collected: 02/07/24 1218    Specimen: Urine, Catheter Updated: 02/07/24 1304    Blood Culture - Blood, Foot, Right [[id number]] Collected: 02/07/24 1228    Specimen: Blood from Foot, Right Updated: 02/07/24 1247            Radiology Review:  Imaging Results (Last 72 Hours)       Procedure Component Value Units Date/Time    CT Abdomen Pelvis Without Contrast [[id number]] Collected: 02/07/24 1720     Updated: 02/07/24 1733    Narrative:      CT ABDOMEN PELVIS WO CONTRAST- 2/7/2024 3:49 PM     HISTORY: Sepsis; R50.9-Fever, unspecified; N39.0-Urinary tract  infection, site not specified; A41.9-Sepsis, unspecified organism;  E87.0-Hyperosmolality and hypernatremia      COMPARISON: 1/29/2024     DLP: 784.68 mGy.cm . All CT scans are performed using dose optimization  techniques as appropriate to the performed exam and including at least  one of the following: Automated exposure control, adjustment of the mA  and/or kV according to size, and the use of the iterative reconstruction  technique.     TECHNIQUE: Noncontrast enhanced images of the abdomen and pelvis  obtained without oral contrast. The study is degraded by motion  artifact.     FINDINGS:  Bibasilar atelectasis is present. The base of the heart is unremarkable.  There is no pericardial effusion..     LIVER: No focal liver lesion.     BILIARY SYSTEM: The gallbladder is unremarkable. No intrahepatic or  extrahepatic ductal dilatation.     PANCREAS: No focal pancreatic lesion.     SPLEEN: Unremarkable.     KIDNEYS AND ADRENALS: The adrenals are unremarkable. Bilateral  nonobstructing nephrolithiasis is present. A left-sided nephrostomy tube  appears to be well positioned within the renal pelvis. A left-sided  ureteral stent is also in place. There is progressive distention of the  upper tracts of the left kidney and proximal left ureter in comparison  to the previous exam. There  is again evidence of pyeloureteritis with  periureteral stranding and thickening of the urothelium.. The right  ureter is decompressed and normal in appearance with no evidence of  ureteral calculus..     RETROPERITONEUM: No mass, lymphadenopathy or hemorrhage.     GI TRACT: A percutaneous gastrostomy feeding tube is in place. It is  well-positioned. The stomach is moderately distended and air-filled. No  evidence of gastric wall thickening or definite gastric outlet  obstruction. There is a large volume of stool throughout the colon which  could be contributing to stasis. A left paramedian ventral wall hernia  is noted at the umbilicus. There is slight protrusion of a portion of  the transverse colon into this defect without evidence of incarceration  or obstruction. There is increased stool within the colon to just above  the level of the rectum. No fecal impaction. No significant small bowel  distention.. The appendix is not clearly identified..     OTHER: There is no mesenteric mass, lymphadenopathy or fluid collection.  The osseous structures and soft tissues demonstrate no worrisome  lesions. No free fluid is present.     PELVIS: A Mojica catheter is in place within the bladder with the urinary  bladder decompressed.. The uterus and adnexa are unremarkable. No free  fluid in the cul-de-sac..       Impression:      1. A left-sided nephrostomy tube and ureteral stent remains in place and  well-positioned. There is mild progressive distention of the upper  tracts of the left kidney as well as of the proximal and mid left ureter  when compared to the previous exam. There is associated pyeloureteritis  with urothelial thickening and stranding surrounding the left renal  pelvis and proximal ureter. Dysfunction of the indwelling stent is  suspected. The right kidney demonstrates nonobstructing nephrolithiasis.  No right-sided ureteral stone is present.  2. Large volume of stool within the colon. No fecal impaction  within the  rectal vault. The stomach is moderately distended with air and fluid  with an air-fluid level but without definite gastric wall thickening or  convincing evidence of gastric outlet obstruction. No evidence of  mechanical obstruction.  3. Just to the left of midline at the umbilicus there is a small  abdominal wall defect. Slight protrusion of a portion of the transverse  colon into this defect is present without incarceration or obstruction.  4. The uterus and adnexa are unremarkable..           This report was signed and finalized on 2/7/2024 5:30 PM by Dr. Yuniel De Jesus MD.       XR Chest 1 View [[id number]] Collected: 02/07/24 1244     Updated: 02/07/24 1250    Narrative:      EXAMINATION: XR CHEST 1 VW-  2/7/2024 12:44 PM 1 view     HISTORY: fever     COMPARISON: 1/29/2024     TECHNIQUE: A single frontal view of the chest was obtained.     FINDINGS:  This examination is limited by positioning of the patient's head over  the RIGHT upper lung/chest. This significantly limits evaluation of the  upper half of the RIGHT lung. The remainder of the lungs are clear. The  heart is not enlarged. A tracheostomy tube is in place. Some gaseous  distention of loops of bowel in the upper abdomen       Impression:         1.  Exam limited by patient positioning as the patient's head obscures  much of the RIGHT hemithorax. The remaining visualized portion of the  lungs are clear.          2.  There are gaseous distended loops of bowel in the upper abdomen.           This report was signed and finalized on 2/7/2024 12:47 PM by Dr. Taj Bergeron MD.               Impression/Plan:    Severe sepsis    Complicated UTI (urinary tract infection)    Severe malnutrition    Functional quadriplegia    Dysphagia    Infection associated with nephrostomy catheter    History of anoxic brain injury    Hypernatremia      Nonfunctioning clogged PEG tube appears to be a balloon tip 18 [place] SANDEEP tube.  Will discuss with endoscopy  for availability of replacement balloontipped PEG tube for replacement today.  The above was discussed in detail with the ICU nursing staff and the endoscopy administrative staff.  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  Any results not available at time of inpatient discharge and/or GI clinic follow up should be managed by the hospitalist team, PCP, or outpatient gastroenterologist.

## 2024-02-08 NOTE — CONSULTS
Rachael Foy MD  Physician  Infectious Disease     Progress Notes     Addendum     Date of Service: 02/07/24 1738  Creation Time: 02/07/24 1738     Expand All Collapse All    INFECTIOUS DISEASES CONSULT NOTE     Patient:  Namita Zabala 48 y.o. female  ROOM # 46/46  YOB: 1975  MRN: [id number]  CSN:    [id number]  Admit date: 2/7/2024   Admitting Physician: Dea Lopez MD  Primary Care Physician: David Lara MD  REFERRING PROVIDER: Dea Lopez MD     Consults     REASON FOR CONSULTATION : Sepsis        HISTORY OF PRESENT ILLNESS: Patient is a 48-year-old patient with diagnosis of functional quadriplegia secondary to anoxic brain injury.  Her  was at bedside to provide independent history.     He reported she was sent to the ER for fever and clogged G-tube.  He was told by the nursing home that they were unable to give her pain medication.  He also noted that she was in the emergency department at the end of January, diagnosed with urinary tract infection.  She received ceftriaxone in the emergency department and was sent out on Keflex.  There is notation that once her final susceptibilities were available, labs were faxed to Dr. Colbert's office for antibiotic change     He reports her nephrostomy tube was changed not too long ago at [place] however he is not sure how long her Mojica catheter has been in place.              Medical History        Past Medical History:   Diagnosis Date    Acute kidney failure, unspecified      Angina at rest      Anoxic brain damage, not elsewhere classified      Chronic pulmonary embolism      Chronic respiratory failure, unspecified whether with hypoxia or hypercapnia      Dependence on respirator (ventilator) status      Gastrointestinal hemorrhage, unspecified      Hypercalcemia      Iron deficiency anemia      Metabolic encephalopathy      Migraine       Sees Pain Management for injections.     MVP (mitral valve  prolapse)      Other dysphagia      Other pulmonary embolism with acute cor pulmonale      Renal disorder      Ruptured bladder      Syncope      Urinary tract infection, site not specified        Pulmonary embolism  Pelvic hematoma subsequently developed pelvic abscess        Surgical History         Past Surgical History:   Procedure Laterality Date    ABDOMINAL SURGERY        BREAST BIOPSY Right 2011     benign    GTUBE INSERTION        INSERTION HEMODIALYSIS CATHETER Left 09/16/2021     Procedure: HEMODIALYSIS CATHETER INSERTION;  Surgeon: Anders Kaur MD;  Location: [place] [address];  Service: Vascular;  Laterality: Left;    TONSILLECTOMY        TRACHEOSTOMY             Surgery for pelvic abscess at [place] found to have necrotic bladder.  Underwent cystorrhaphy but had continued bladder leak.  Developed necrotizing abdominal wall infection and underwent multiple debridements.  Patient initially had bilateral percutaneous nephrostomies placed and subsequently ended up with left nephrostomy and indwelling Mojica catheter           Family History   Problem Relation Age of Onset    Colon cancer Maternal Aunt 52    Fibromyalgia Mother      Heart disease Mother      Hypertension Mother      Hodgkin's lymphoma Paternal Grandmother      Ovarian cancer Neg Hx      Breast cancer Neg Hx        Social History   Social History           Socioeconomic History    Marital status:    Tobacco Use    Smoking status: Never    Smokeless tobacco: Never   Vaping Use    Vaping Use: Never used   Substance and Sexual Activity    Alcohol use: No    Drug use: No            Current Scheduled Medications:   famotidine, 20 mg, Intravenous, Q12H  heparin (porcine), 5,000 Units, Subcutaneous, Q12H  meropenem, 1,000 mg, Intravenous, Q8H  sodium chloride, 10 mL, Intravenous, Q12H           Current PRN Medications:    acetaminophen    senna-docusate sodium **AND** polyethylene glycol **AND** bisacodyl **AND** bisacodyl     "Morphine    ondansetron    Pharmacy to Dose meropenem (MERREM)    [COMPLETED] Insert Peripheral IV **AND** sodium chloride    sodium chloride    sodium chloride        dextrose, 125 mL/hr, Last Rate: 125 mL/hr (24 1644)  Pharmacy to Dose meropenem (MERREM),                        Allergies   Allergen Reactions    Coconut Unknown - High Severity    Levaquin [Levofloxacin] Other (See Comments)       unknown    Nuts Unknown - High Severity    Penicillins      [place] Other (See Comments)       Causes migraines per pt reports               Vital Signs:  /99   Pulse (!) 145   Temp (!) 101.2 °F (38.4 °C) (Axillary)   Resp 20   Ht 152.4 cm (60\")   Wt 52.4 kg (115 lb 8 oz)   LMP  (LMP Unknown)   SpO2 98%   BMI 22.56 kg/m²   Temp (24hrs), Av.1 °F (38.9 °C), Min:100.7 °F (38.2 °C), Max:103.4 °F (39.7 °C)        Physical Exam  General: Patient is acutely ill-appearing lying in bed.  Her  and father are at bedside  HEENT: Sclera anicteric.  Neck: Trach in place with trach collar in place  Heart: Distant, tachycardic with current rate 138  Lungs: Diminished breath sounds throughout  Abdomen: G-tube in place without any significant drainage or erythema  Unable to turn patient well to review the insertion of her nephrostomy tube.  She does have urine and then tried nephrostomy tube bag it is slightly cloudy.        Line/IV site: Peripheral IV right upper arm.  No evidence of phlebitis      Results Review:    I reviewed the patient's new clinical results.     Lab Results:     CBC:       Lab Results   Lab 24  1416   WBC 15.14*   HEMOGLOBIN 13.3   HEMATOCRIT 42.6   PLATELETS 312         AutoDiff:       Lab Results   Lab 24  1416   NEUTROPHIL % 86.1*   LYMPHOCYTE % 7.5*   MONOCYTES % 5.6   EOSINOPHIL % 0.0*   BASOPHIL % 0.5   NEUTROS ABS 13.04*   LYMPHS ABS 1.14   MONOS ABS 0.85   EOS ABS 0.00   BASOS ABS 0.07         Manual Diff:        Lab Results   Lab 24  1416   NEUTROS ABS 13.04* "         CMP:       Lab Results   Lab 02/07/24 1411   SODIUM 161*   POTASSIUM 4.0   CHLORIDE 115*   CO2 27.0   BUN 56*   CREATININE 0.97   CALCIUM 11.4*   BILIRUBIN 0.9   ALK PHOS 62   ALT (SGPT) 13   AST (SGOT) 20   GLUCOSE 161*         Lab Results (last 72 hours)         Procedure Component Value Units Date/Time     Protime-INR [[id number]]  (Abnormal) Collected: 02/07/24 1411     Specimen: Blood Updated: 02/07/24 1517       Protime 14.3 Seconds         INR 1.10     aPTT [[id number]]  (Normal) Collected: 02/07/24 1411     Specimen: Blood Updated: 02/07/24 1517       PTT 28.0 seconds       Blood Culture - Blood, Hand, Digit Right [[id number]] Collected: 02/07/24 1420     Specimen: Blood from Hand, Digit Right Updated: 02/07/24 1451     Comprehensive Metabolic Panel [[id number]]  (Abnormal) Collected: 02/07/24 1411     Specimen: Blood Updated: 02/07/24 1449       Glucose 161 mg/dL         BUN 56 mg/dL         Creatinine 0.97 mg/dL         Sodium 161 mmol/L         Potassium 4.0 mmol/L         Comment: Slight hemolysis detected by analyzer. Result may be falsely elevated.          Chloride 115 mmol/L         CO2 27.0 mmol/L         Calcium 11.4 mg/dL         Total Protein 8.8 g/dL         Albumin 4.9 g/dL         ALT (SGPT) 13 U/L         AST (SGOT) 20 U/L         Comment: Slight hemolysis detected by analyzer. Result may be falsely elevated.          Alkaline Phosphatase 62 U/L         Total Bilirubin 0.9 mg/dL         Globulin 3.9 gm/dL         A/G Ratio 1.3 g/dL         BUN/Creatinine Ratio 57.7       Anion Gap 19.0 mmol/L         eGFR 73.1 mL/min/1.73       Narrative:       GFR Normal >60  Chronic Kidney Disease <60  Kidney Failure <15        Lactic Acid, Plasma [[id number]]  (Abnormal) Collected: 02/07/24 1411     Specimen: Blood Updated: 02/07/24 1448       Lactate 2.4 mmol/L       Procalcitonin [[id number]]  (Abnormal) Collected: 02/07/24 1411     Specimen: Blood Updated: 02/07/24 1445       Procalcitonin 0.42 ng/mL  "      Narrative:       As a Marker for Sepsis (Non-Neonates):     1. <0.5 ng/mL represents a low risk of severe sepsis and/or septic shock.  2. >2 ng/mL represents a high risk of severe sepsis and/or septic shock.     As a Marker for Lower Respiratory Tract Infections that require antibiotic therapy:     PCT on Admission    Antibiotic Therapy       6-12 Hrs later     >0.5                Strongly Recommended  >0.25 - <0.5        Recommended   0.1 - 0.25          Discouraged              Remeasure/reassess PCT  <0.1                Strongly Discouraged     Remeasure/reassess PCT     As 28 day mortality risk marker: \"Change in Procalcitonin Result\" (>80% or <=80%) if Day 0 (or Day 1) and Day 4 values are available. Refer to http://[email or web address] [email or web address][place]cal[email or web address]     Change in PCT <=80%  A decrease of PCT levels below or equal to 80% defines a positive change in PCT test result representing a higher risk for 28-day all-cause mortality of patients diagnosed with severe sepsis for septic shock.     Change in PCT >80%  A decrease of PCT levels of more than 80% defines a negative change in PCT result representing a lower risk for 28-day all-cause mortality of patients diagnosed with severe sepsis or septic shock.         C-reactive Protein [[id number]]  (Normal) Collected: 02/07/24 1411     Specimen: Blood Updated: 02/07/24 1442       C-Reactive Protein <0.30 mg/dL       BNP [[id number]]  (Abnormal) Collected: 02/07/24 1411     Specimen: Blood Updated: 02/07/24 1437       proBNP 1,058.0 pg/mL       Narrative:       This assay is used as an aid in the diagnosis of individuals suspected of having heart failure. It can be used as an aid in the diagnosis of acute decompensated heart failure (ADHF) in patients presenting with signs and symptoms of ADHF to the emergency department (ED). In addition, NT-proBNP of <300 pg/mL indicates ADHF is not likely.     Age Range         Result Interpretation  NT-proBNP Concentration (pg/mL:      "   <50             Positive            >450                         Abdi                           300-450                           Negative               <300     50-75           Positive            >900                  Gray                300-900                  Negative            <300        >75             Positive            >1800                  Gray                300-1800                  Negative            <300     Sedimentation Rate [[id number]]  (Abnormal) Collected: 02/07/24 1416     Specimen: Blood Updated: 02/07/24 1427       Sed Rate 108 mm/hr       CBC & Differential [[id number]]  (Abnormal) Collected: 02/07/24 1416     Specimen: Blood Updated: 02/07/24 1419     Narrative:       The following orders were created for panel order CBC & Differential.  Procedure                               Abnormality         Status                     ---------                               -----------         ------                     CBC Auto Differential[[id number]]        Abnormal            Final result                  Please view results for these tests on the individual orders.     CBC Auto Differential [[id number]]  (Abnormal) Collected: 02/07/24 1416     Specimen: Blood Updated: 02/07/24 1419       WBC 15.14 10*3/mm3         RBC 4.23 10*6/mm3         Hemoglobin 13.3 g/dL         Hematocrit 42.6 %         .7 fL         MCH 31.4 pg         MCHC 31.2 g/dL         RDW 15.4 %         RDW-SD 56.6 fl         MPV 11.8 fL         Platelets 312 10*3/mm3         Neutrophil % 86.1 %         Lymphocyte % 7.5 %         Monocyte % 5.6 %         Eosinophil % 0.0 %         Basophil % 0.5 %         Immature Grans % 0.3 %         Neutrophils, Absolute 13.04 10*3/mm3         Lymphocytes, Absolute 1.14 10*3/mm3         Monocytes, Absolute 0.85 10*3/mm3         Eosinophils, Absolute 0.00 10*3/mm3         Basophils, Absolute 0.07 10*3/mm3         Immature Grans, Absolute 0.04 10*3/mm3         nRBC 0.0 /100 WBC        COVID-19, FLU A/B, RSV PCR 1 HR TAT - Swab, Nasopharynx [[id number]]  (Normal) Collected: 02/07/24 1215     Specimen: Swab from Nasopharynx Updated: 02/07/24 1340       COVID19 Not Detected       Influenza A PCR Not Detected       Influenza B PCR Not Detected       RSV, PCR Not Detected     Narrative:       Fact sheet for providers: https://www.fda.gov/media/249175/download    Fact sheet for patients: https://www.fda.gov/media/782598/download     Test performed by PCR.     Urinalysis With Culture If Indicated - Urine, Catheter [[id number]]  (Abnormal) Collected: 02/07/24 1218     Specimen: Urine, Catheter Updated: 02/07/24 1305       Color, UA Dark Yellow       Appearance, UA Turbid       pH, UA 7.0       Specific Gravity, UA 1.024       Glucose, UA Negative       Ketones, UA 15 mg/dL (1+)       Bilirubin, UA Negative       Blood, UA Large (3+)       Protein, UA >=300 mg/dL (3+)       Leuk Esterase, UA Large (3+)       Nitrite, UA Negative       Urobilinogen, UA 1.0 E.U./dL     Narrative:       In absence of clinical symptoms, the presence of pyuria, bacteria, and/or nitrites on the urinalysis result does not correlate with infection.     Urinalysis, Microscopic Only - Urine, Catheter [[id number]]  (Abnormal) Collected: 02/07/24 1218     Specimen: Urine, Catheter Updated: 02/07/24 1305       RBC, UA 6-10 /HPF         WBC, UA 21-50 /HPF         Bacteria, UA 3+ /HPF         Squamous Epithelial Cells, UA 3-6 /HPF         Hyaline Casts, UA 0-2 /LPF         Methodology Manual Light Microscopy     Urine Culture - Urine, Urine, Catheter [[id number]] Collected: 02/07/24 1218     Specimen: Urine, Catheter Updated: 02/07/24 1304     Blood Culture - Blood, Foot, Right [[id number]] Collected: 02/07/24 1228     Specimen: Blood from Foot, Right Updated: 02/07/24 1247                Estimated Creatinine Clearance: 59.9 mL/min (by C-G formula based on SCr of 0.97 mg/dL).     Culture Results:     Microbiology Results (last 10 days)          Procedure Component Value - Date/Time     COVID-19, FLU A/B, RSV PCR 1 HR TAT - Swab, Nasopharynx [[id number]]  (Normal) Collected: 02/07/24 1215     Lab Status: Final result Specimen: Swab from Nasopharynx Updated: 02/07/24 1340       COVID19 Not Detected       Influenza A PCR Not Detected       Influenza B PCR Not Detected       RSV, PCR Not Detected     Narrative:       Fact sheet for providers: https://www.fda.gov/media/995070/download    Fact sheet for patients: https://www.fda.gov/media/573995/download     Test performed by PCR.     COVID PRE-OP / PRE-PROCEDURE SCREENING ORDER (NO ISOLATION) - Swab, Nasal Cavity [[id number]]  (Normal) Collected: 01/29/24 0414     Lab Status: Final result Specimen: Swab from Nasal Cavity Updated: 01/29/24 0449     Narrative:       The following orders were created for panel order COVID PRE-OP / PRE-PROCEDURE SCREENING ORDER (NO ISOLATION) - Swab, Nasal Cavity.  Procedure                               Abnormality         Status                     ---------                               -----------         ------                     COVID-19 and FLU A/B PCR...[[id number]]  Normal              Final result                  Please view results for these tests on the individual orders.     COVID-19 and FLU A/B PCR, 1 HR TAT - Swab, Nasopharynx [[id number]]  (Normal) Collected: 01/29/24 0414     Lab Status: Final result Specimen: Swab from Nasopharynx Updated: 01/29/24 0449       COVID19 Not Detected       Influenza A PCR Not Detected       Influenza B PCR Not Detected     Narrative:       Fact sheet for providers: https://www.fda.gov/media/639082/download     Fact sheet for patients: https://www.fda.gov/media/926780/download     Test performed by PCR.     Urine Culture - Urine, Indwelling Urethral Catheter [[id number]]  (Abnormal)  (Susceptibility) Collected: 01/29/24 0413     Lab Status: Final result Specimen: Urine from Indwelling Urethral Catheter Updated: 02/01/24 0950        Urine Culture >100,000 CFU/mL Escherichia coli ESBL       Comment:    Consider infectious disease consult.  Susceptibility results may not correlate to clinical outcomes.            >100,000 CFU/mL Proteus mirabilis         >100,000 CFU/mL Pseudomonas aeruginosa     Narrative:       Colonization of the urinary tract without infection is common. Treatment is discouraged unless the patient is symptomatic, pregnant, or undergoing an invasive urologic procedure.  Recent outcomes data supports the use of pip/tazo in the treatment of susceptible ESBL infections for uncomplicated UTI. Consider use of pip/tazo as a carbapenem-sparing regimen in applicable patients.     Susceptibility                   Escherichia coli ESBL         SANDEEP         Amikacin Susceptible         Ertapenem Susceptible         Gentamicin Resistant         Levofloxacin Resistant         Meropenem Susceptible         Nitrofurantoin Susceptible         Piperacillin + Tazobactam Susceptible         Tobramycin Resistant         Trimethoprim + Sulfamethoxazole Resistant                                Susceptibility                   Proteus mirabilis         SANDEEP         Amoxicillin + Clavulanate Susceptible         Ampicillin Susceptible         Ampicillin + Sulbactam Susceptible         Cefazolin Susceptible         Cefepime Susceptible         Ceftazidime Susceptible         Ceftriaxone Susceptible         Gentamicin Susceptible         Levofloxacin Resistant         Nitrofurantoin Resistant         Piperacillin + Tazobactam Susceptible         Trimethoprim + Sulfamethoxazole Susceptible                                Susceptibility                   Pseudomonas aeruginosa         SANDEEP         Cefepime Susceptible         Ceftazidime Susceptible         Ciprofloxacin Intermediate         Levofloxacin Resistant         Piperacillin + Tazobactam Susceptible         Tobramycin Susceptible                                     Blood Culture - Blood, Hand, Left  [[id number]]  (Normal) Collected: 01/29/24 0331     Lab Status: Final result Specimen: Blood from Hand, Left Updated: 02/03/24 0400       Blood Culture No growth at 5 days     Blood Culture - Blood, Hand, Right [[id number]]  (Normal) Collected: 01/29/24 0318     Lab Status: Final result Specimen: Blood from Hand, Right Updated: 02/03/24 0400       Blood Culture No growth at 5 days                   Radiology:           Nephrostomy tube and left ureteral stent in place left kidney.  Hydronephrosis noted.  Edema and stranding noted around the left proximal ureter concerning for problem with ureteral stent..  Stool noted throughout the colon.        Imaging Results (Last 72 Hours)         Procedure Component Value Units Date/Time     CT Abdomen Pelvis Without Contrast [[id number]] Collected: 02/07/24 1720       Updated: 02/07/24 1733     Narrative:       CT ABDOMEN PELVIS WO CONTRAST- 2/7/2024 3:49 PM     HISTORY: Sepsis; R50.9-Fever, unspecified; N39.0-Urinary tract  infection, site not specified; A41.9-Sepsis, unspecified organism;  E87.0-Hyperosmolality and hypernatremia      COMPARISON: 1/29/2024     DLP: 784.68 mGy.cm . All CT scans are performed using dose optimization  techniques as appropriate to the performed exam and including at least  one of the following: Automated exposure control, adjustment of the mA  and/or kV according to size, and the use of the iterative reconstruction  technique.     TECHNIQUE: Noncontrast enhanced images of the abdomen and pelvis  obtained without oral contrast. The study is degraded by motion  artifact.     FINDINGS:  Bibasilar atelectasis is present. The base of the heart is unremarkable.  There is no pericardial effusion..     LIVER: No focal liver lesion.     BILIARY SYSTEM: The gallbladder is unremarkable. No intrahepatic or  extrahepatic ductal dilatation.     PANCREAS: No focal pancreatic lesion.     SPLEEN: Unremarkable.     KIDNEYS AND ADRENALS: The adrenals are  unremarkable. Bilateral  nonobstructing nephrolithiasis is present. A left-sided nephrostomy tube  appears to be well positioned within the renal pelvis. A left-sided  ureteral stent is also in place. There is progressive distention of the  upper tracts of the left kidney and proximal left ureter in comparison  to the previous exam. There is again evidence of pyeloureteritis with  periureteral stranding and thickening of the urothelium.. The right  ureter is decompressed and normal in appearance with no evidence of  ureteral calculus..     RETROPERITONEUM: No mass, lymphadenopathy or hemorrhage.     GI TRACT: A percutaneous gastrostomy feeding tube is in place. It is  well-positioned. The stomach is moderately distended and air-filled. No  evidence of gastric wall thickening or definite gastric outlet  obstruction. There is a large volume of stool throughout the colon which  could be contributing to stasis. A left paramedian ventral wall hernia  is noted at the umbilicus. There is slight protrusion of a portion of  the transverse colon into this defect without evidence of incarceration  or obstruction. There is increased stool within the colon to just above  the level of the rectum. No fecal impaction. No significant small bowel  distention.. The appendix is not clearly identified..     OTHER: There is no mesenteric mass, lymphadenopathy or fluid collection.  The osseous structures and soft tissues demonstrate no worrisome  lesions. No free fluid is present.     PELVIS: A Mojica catheter is in place within the bladder with the urinary  bladder decompressed.. The uterus and adnexa are unremarkable. No free  fluid in the cul-de-sac..        Impression:       1. A left-sided nephrostomy tube and ureteral stent remains in place and  well-positioned. There is mild progressive distention of the upper  tracts of the left kidney as well as of the proximal and mid left ureter  when compared to the previous exam. There is  associated pyeloureteritis  with urothelial thickening and stranding surrounding the left renal  pelvis and proximal ureter. Dysfunction of the indwelling stent is  suspected. The right kidney demonstrates nonobstructing nephrolithiasis.  No right-sided ureteral stone is present.  2. Large volume of stool within the colon. No fecal impaction within the  rectal vault. The stomach is moderately distended with air and fluid  with an air-fluid level but without definite gastric wall thickening or  convincing evidence of gastric outlet obstruction. No evidence of  mechanical obstruction.  3. Just to the left of midline at the umbilicus there is a small  abdominal wall defect. Slight protrusion of a portion of the transverse  colon into this defect is present without incarceration or obstruction.  4. The uterus and adnexa are unremarkable..           This report was signed and finalized on 2/7/2024 5:30 PM by Dr. Yuniel De Jesus MD.        XR Chest 1 View [[id number]] Collected: 02/07/24 1244       Updated: 02/07/24 1250     Narrative:       EXAMINATION: XR CHEST 1 VW-  2/7/2024 12:44 PM 1 view     HISTORY: fever     COMPARISON: 1/29/2024     TECHNIQUE: A single frontal view of the chest was obtained.     FINDINGS:  This examination is limited by positioning of the patient's head over  the RIGHT upper lung/chest. This significantly limits evaluation of the  upper half of the RIGHT lung. The remainder of the lungs are clear. The  heart is not enlarged. A tracheostomy tube is in place. Some gaseous  distention of loops of bowel in the upper abdomen        Impression:          1.  Exam limited by patient positioning as the patient's head obscures  much of the RIGHT hemithorax. The remaining visualized portion of the  lungs are clear.          2.  There are gaseous distended loops of bowel in the upper abdomen.           This report was signed and finalized on 2/7/2024 12:47 PM by Dr. Taj Bergeron MD.                       HOSPITAL PROBLEM LIST:       Sepsis        IMPRESSION:  Sepsis likely secondary to urinary tract infection.  A threat to life or bodily function.  Patient is febrile, tachycardic, leukocytosis and urinary source of infection most likely..  Urinary tract infection with urine culture positive for ESBL E. coli, Proteus mirabilis and Pseudomonas aeruginosa collected January 29, 2024 while in the emergency department.  CT scan also raising concern for malfunction of left ureteral stent.  There is notation from the emergency department February 1 that they had contacted Dr. Colbert, fax labs to his office so they could change patient's antibiotics.  When treated in the emergency department, she was given a dose of ceftriaxone and discharged on Keflex.  The med list in [facility] for prior to admission still included Keflex.  Did not see any IV antibiotics or other antibiotics listed.  Reported penicillin allergy-discussed with patient's dad.  She had rash with penicillin.  He does not recall any swelling, shortness of breath, etc. per review of the chart she is receiving cefepime, ceftriaxone, cephalexin and carbapenems  Hypernatremia         RECOMMENDATION:   Continue meropenem given ESBL E. coli and history of Pseudomonas along with Proteus from urine culture January 29  Will ask microbiology if they can unsuppressed any carbapenems for the Proteus and Pseudomonas in her urine culture from January 29  Follow-up on urine culture obtained today-this was obtained from the Mojica catheter per verbal report from the emergency department nurse.  This was not obtained from the nephrostomy tube.  IV fluid support for sepsis.  Monitor hemodynamics.  Blood pressure is stable however she remains quite tachycardic.  Agree with not redosing vancomycin at this time  Await further urology input regarding left ureteral stent.        Reviewed [place] [facility] note from January 11, 2024  Discussed with  and father-independent  historians  Rachael Foy MD  02/07/24  17:38 CST

## 2024-02-08 NOTE — PLAN OF CARE
Goal Outcome Evaluation:  Plan of Care Reviewed With: patient, caregiver        Progress: no change                                  Patient not able to participate in skilled ST intervention due to medical complexity. She does open eyes to voice but does not follow my commands this AM. Not appropriate for swallow evaluation. Will d/c order. Please re-consult if patient becomes appropriate.     Maria Del Carmen Guthrie MS CCC-SLP 2/8/2024 08:20 CST

## 2024-02-08 NOTE — CONSULTS
Adult Nutrition  Assessment/PES    Patient Name:  Namita Zabala  YOB: 1975  MRN: [id number]  Admit Date:  2/7/2024    Assessment Date:  2/8/2024    Comments:  Ntn consult for TF assessment.     Recommend:  When MD ready for TF to start: Peptamen 1.5 to start at 25mL/hour. Increase by 10mL every 12 hours as tolerated to a goal rate of 45mL/hour. Free water flushes of 35mL/hour via pump.     See below for nutrient estimations and TF nutrient breakdown. Will follow to establish tolerance and make adjustments as necessary.    Pt has been receiving Nutren 2.0 at 45mL/hour with a 50mL water flush every hour via pump at [facility]. Per review of chart, pt was on Isosource 1.5 at 45mL/hour during last admission at this facility. She was in [place] at the end of December and recommended to be on either Nutren 1.5 or Peptamen 1.5 at 45mL/hour. The Nutren 2.0 at 45mL/hour is exceeding recommended nutrient needs.      Reason for Assessment       Row Name 02/08/24 1107          Reason for Assessment    Reason For Assessment physician consult;TF/PN     Diagnosis neurologic conditions;infection/sepsis;nutrition related history;metabolic state                    Nutrition/Diet History       Row Name 02/08/24 1108          Nutrition/Diet History    Typical Intake (Food/Fluid/EN/PN) Pt was brought to ED secondary to clogged feeding tube and fever for the last week. Received consult for TF assessment. Pt to have PEG tube replaced today. Pt has been receiving Nutren 2.0 at 45mL/hour with a 50mL water flush every hour via pump. She resides at [facility]. Per review of chart, pt was on Isosource 1.5 at 45mL/hour during last admission at this facility. She was in [place] at the end of December and recommended to be on either Nutren 1.5 or Peptamen 1.5 at 45mL/hour. The Nutren 2.0 at 45mL/hour is exceeding recommended nutrient needs. She has a dx of severe malnutrition with  hx of anoxic brain injury and  dysphagia. Will recommend Peptamen 1.5 at 45mL/hour to better meet est'd needs.     Enteral Nutrition Regimen At [facility]: Nutren 2.0 at 45mL/hour, 50mL flush; Recommended from [place]: Peptamen 1.5 or Nutren 1.5 at 45mL/hour     Factors Affecting Nutritional Intake enteral/parenteral device(s);difficulty/impaired swallowing;cognitive status/motor function                    Labs/Tests/Procedures/Meds       Row Name 02/08/24 1113          Labs/Procedures/Meds    Lab Results Reviewed reviewed, pertinent     Lab Results Comments BUN, Na+, K+, Cl-, anion gap, Mg++        Diagnostic Tests/Procedures    Diagnostic Test/Procedure Reviewed reviewed, pertinent     Diagnostic Test/Procedures Comments GI to replace PEG in endoscopy 2/8        Medications    Pertinent Medications Reviewed reviewed, pertinent     Pertinent Medications Comments pepcid, merrem, MVI w/min, florastor,                    Physical Findings       Row Name 02/08/24 1114          Physical Findings    Overall Physical Appearance unknown lasb BM, L neph tube, quadriplegia, trach collar, rachel score 10     Enteral Access Devices PEG                    Estimated/Assessed Needs - Anthropometrics       Row Name 02/08/24 1115 02/08/24 0526       Anthropometrics    Weight -- 54.5 kg (120 lb 2.4 oz)    Weight for Calculation 54.5 kg (120 lb 2.4 oz) --       Estimated/Assessed Needs    Additional Documentation KCAL/KG (Group);Protein Requirements (Group);Fluid Requirements (Group) --       KCAL/KG    KCAL/KG Kcal/KG (kcal) comment;25 Kcal/Kg (kcal) --    25 Kcal/Kg (kcal) 1362.5 --    30 Kcal/Kg (kcal) -- --    KCAL/KG (kcal) 23kcal/kg = 1254 --       Protein Requirements    Weight Used For Protein Calculations 54.5 kg (120 lb 2.4 oz) --    Est Protein Requirement Amount (gms/kg) 1.2 gm protein --    Estimated Protein Requirements (gms/day) 65.4 --       Fluid Requirements    Fluid Requirements (mL/day) 1363 --    Estimated  Fluid Requirement Method other (see comments)  25mL/kg --    RDA Method (mL) 1363 --                   Nutrition Prescription Ordered       Row Name 02/08/24 1132          Nutrition Prescription PO    Current PO Diet NPO                    Evaluation of Received Nutrient/Fluid Intake       Row Name 02/08/24 1133          Nutrient/Fluid Evaluation    Number of Days Evaluated 1 day     Additional Documentation Fluid Intake Evaluation (Group)        Fluid Intake Evaluation    Other Fluid (mL) 387.5        PO Evaluation    % PO Intake NPO        EN Evaluation    TF Changes Held     HOB Greater than or equal to 30 degress                       Problem/Interventions:   Problem 1       Row Name 02/08/24 1133          Nutrition Diagnoses Problem 1    Problem 1 Needs Alternate Route     Etiology (related to) Medical Diagnosis;Functional Diagnosis     Infectious Disease Sepsis;UTI     Neurological Anoxic brain injury;Paraparesis     Functional Diagnosis Dysphagia;Cognitive deficit     Signs/Symptoms (evidenced by) No EN Route Available;NPO;EN Intake Delivery;Unintended Weight Change  TF not functioning properly, to be replaced     Percent (%) of EN goal --  TF consult     Unintended Weight Change Loss     Number of Pounds Lost 20#     Weight loss time period 1 month                          Intervention Goal       Row Name 02/08/24 1135          Intervention Goal    General Nutrition support treatment;Reduce/improve symptoms;Improved nutrition related lab(s);Meet nutritional needs for age/condition;Disease management/therapy     TF/PN Inititiate TF/PN;Tolerate TF at goal;Deliver (%) goal     Deliver % of Goal 75 %  or >     Weight Maintain weight                    Nutrition Intervention       Row Name 02/08/24 1136          Nutrition Intervention    RD/Tech Action Follow Tx progress;Care plan reviewd;Recommend/ordered     Recommended/Ordered EN                    Nutrition Prescription       Row Name 02/08/24 1133           Nutrition Prescription EN    Enteral Prescription Enteral begin/change;Enteral to supply     Enteral Route PEG     Product Peptamin 1.5 darryl     TF Delivery Method Continuous     Continuous TF Goal Rate (mL/hr) 45 mL/hr     Continuous TF Starting Rate (mL/hr) 25 mL/hr     Continuous TF Goal Volume (mL) 990 mL     Water flush (mL)  35 mL     Water Flush Frequency Per hour     New EN Prescription Ordered? Yes        EN to Supply    Kcal/Day 1485 Kcal/Day     Kcal/Kg 27 Kcal/Kg     Kcal/Kg Weight Method Actual weight     Protein (gm/day) 67 gm/day     Meet Estimated Kcal Need (%) 109 %     Meet Estimated Protein Need (%) 103 %     TF Free H2O (mL) 764 mL     Total Free H2O (mL/day) 1534 mL/day     Fiber Per Day (gm/day) 0 gm/day        Other Orders    Obtain Weight Daily     Obtain Weight Ordered? Yes                    Education/Evaluation       Row Name 02/08/24 1137          Education    Education No discharge needs identified at this time        Monitor/Evaluation    Monitor Per protocol                     Electronically signed by:  Kayleen Ballesteros RDN, LINDA  02/08/24 11:40 CST

## 2024-02-08 NOTE — CASE MANAGEMENT/SOCIAL WORK
Discharge Planning Assessment  [facility]     Patient Name: Namita Zabala  MRN: [id number]  Today's Date: 2/8/2024    Admit Date: 2/7/2024        Discharge Needs Assessment       Row Name 02/08/24 1009       Living Environment    People in Home facility resident    [facility]    Current Living Arrangements extended care facility    Potentially Unsafe Housing Conditions none    In the past 12 months has the electric, gas, oil, or water company threatened to shut off services in your home? No    Quality of Family Relationships supportive;helpful;involved    Able to Return to Prior Arrangements yes       Resource/Environmental Concerns    Resource/Environmental Concerns none    Transportation Concerns none       Transportation Needs    In the past 12 months, has lack of transportation kept you from medical appointments or from getting medications? no    In the past 12 months, has lack of transportation kept you from meetings, work, or from getting things needed for daily living? No       Food Insecurity    Within the past 12 months, you worried that your food would run out before you got the money to buy more. Never true    Within the past 12 months, the food you bought just didn't last and you didn't have money to get more. Never true       Transition Planning    Patient/Family Anticipates Transition to long-term care facility    Patient/Family Anticipated Services at Transition skilled nursing    Transportation Anticipated health plan transportation       Discharge Needs Assessment    Readmission Within the Last 30 Days no previous admission in last 30 days    Current Outpatient/Agency/Support Group skilled nursing facility    Concerns to be Addressed care coordination/care conferences;discharge planning    Outpatient/Agency/Support Group Needs skilled nursing facility    Discharge Facility/Level of Care Needs nursing facility, skilled    Current Discharge Risk chronically ill     Discharge Coordination/Progress Patient resides at [facility].  Per Awilda, admissions coordinator at [facility], patient does have a bed hold.  Tentative plan at this time is for patient to return to [facility].                     Discharge Plan    No documentation.                 Continued Care and Services - Admitted Since 2/7/2024    Coordination has not been started for this encounter.       Selected Continued Care - Prior Encounters Includes continued care and service providers with selected services from prior encounters from 11/9/2023 to 2/8/2024      Discharged on 12/5/2023 Admission date: 11/25/2023 - Discharge disposition: Skilled Nursing Facility (DC - External)      Destination       Service Provider Selected Services Address Phone Fax Patient Preferred    [facility] [address] [place] [address] [phone] --                             Demographic Summary    No documentation.                  Functional Status    No documentation.                  Psychosocial    No documentation.                  Abuse/Neglect    No documentation.                  Legal    No documentation.                  Substance Abuse    No documentation.                  Patient Forms    No documentation.                     CAMILA Gonzalez

## 2024-02-08 NOTE — CONSULTS
Referring Provider: Araceli  Reason for Consultation: Sepsis, nephrostomy tube malfunction    Chief complaint: Sepsis    Subjective     History of present illness:    Ms. Zabala is a 48 year old female with a complex medical history starting with a COVID diagnosis in 2021. There was a retroperitoneal hematoma that was managed conservatively. At some point, an abscess developed in the hematoma that resulted in a necrotizing infection of the pelvic organs and anterior abdominal wall.     The patient had multiple urologic and abdominal wall surgeries. These were performed at [place].    She has a left nephroureteral stent last exchanged at  in 10/2023 with instructions to exchange the stent in 12 weeks.     IR Nephroureteral Stent Placement Existing Access Left (10/13/2023 17:46 EDT)     The patient moved from [place] to a local facility around 09/2023.     Per chart review, the patient was seen again at [facility] as an outpatient on 01/11/2023 presumably to establish with a closer urology department that is appropriate for her complex diagnoses. Per that note (see Care Everywhere), they were unable to advance her care as there was no POA available at that appointment.       She underwent labs and CT in our ER yesterday.    CT Abdomen Pelvis Without Contrast (02/07/2024 17:06)     CBC & Differential (02/07/2024 14:16)    Procalcitonin (02/07/2024 14:11)    Lactic Acid, Plasma (02/07/2024 14:11)    Comprehensive Metabolic Panel (02/07/2024 14:11)    Urine Culture - Urine, Indwelling Urethral Catheter (01/29/2024 04:13)    Urinalysis, Microscopic Only - Urine, Catheter (02/07/2024 12:18)    Review of Systems  Pertinent items are noted in HPI, all other systems reviewed and negative     History  Past Medical History:   Diagnosis Date    Acute kidney failure, unspecified     Angina at rest     Anoxic brain damage, not elsewhere classified     Chronic pulmonary embolism     Chronic respiratory failure,  unspecified whether with hypoxia or hypercapnia     Dependence on respirator (ventilator) status     Gastrointestinal hemorrhage, unspecified     Hypercalcemia     Iron deficiency anemia     Metabolic encephalopathy     Migraine     Sees Pain Management for injections.     MVP (mitral valve prolapse)     Other dysphagia     Other pulmonary embolism with acute cor pulmonale     Renal disorder     Ruptured bladder     Syncope     Urinary tract infection, site not specified        Past Surgical History:   Procedure Laterality Date    ABDOMINAL SURGERY      BREAST BIOPSY Right 2011    benign    GTUBE INSERTION      INSERTION HEMODIALYSIS CATHETER Left 09/16/2021    Procedure: HEMODIALYSIS CATHETER INSERTION;  Surgeon: Anders Kaur MD;  Location: [place] [address];  Service: Vascular;  Laterality: Left;    TONSILLECTOMY      TRACHEOSTOMY         Family History   Problem Relation Age of Onset    Colon cancer Maternal Aunt 52    Fibromyalgia Mother     Heart disease Mother     Hypertension Mother     Hodgkin's lymphoma Paternal Grandmother     Ovarian cancer Neg Hx     Breast cancer Neg Hx        Social History     Socioeconomic History    Marital status:    Tobacco Use    Smoking status: Never    Smokeless tobacco: Never   Vaping Use    Vaping Use: Never used   Substance and Sexual Activity    Alcohol use: No    Drug use: No       Current Facility-Administered Medications   Medication Dose Route Frequency Provider Last Rate Last Admin    acetaminophen (TYLENOL) suppository 325 mg  325 mg Rectal Q4H PRN Tae Charles MD   325 mg at 02/08/24 0350    albuterol (PROVENTIL) nebulizer solution 0.083% 2.5 mg/3mL  2.5 mg Nebulization Q4H PRN Tae Charles MD        artificial tears ophthalmic ointment   Both Eyes Q1H PRN Tae Charles MD        baclofen (LIORESAL) tablet 10 mg  10 mg Per G Tube Q6H Tae Charles MD        sennosides-docusate (PERICOLACE) 8.6-50 MG per tablet 2 tablet  2  tablet Oral BID PRN Tae Charles MD        And    polyethylene glycol (MIRALAX) packet 17 g  17 g Oral Daily PRN Tae Charles MD        And    bisacodyl (DULCOLAX) EC tablet 5 mg  5 mg Oral Daily PRN Tae Charles MD        And    bisacodyl (DULCOLAX) suppository 10 mg  10 mg Rectal Daily PRN Tae Charles MD        dextrose 5 % and sodium chloride 0.45 % with KCl 20 mEq/L infusion  125 mL/hr Intravenous Continuous Tae Charles MD        famotidine (PEPCID) injection 20 mg  20 mg Intravenous Q12H Tae Charles MD   20 mg at 02/07/24 1605    heparin (porcine) 5000 UNIT/ML injection 5,000 Units  5,000 Units Subcutaneous Q12H Tae Charles MD   5,000 Units at 02/07/24 2109    hydrOXYzine (ATARAX) tablet 25 mg  25 mg Per G Tube Q6H PRN Tae Charles MD        meropenem (MERREM) 1,000 mg in sodium chloride 0.9 % 100 mL IVPB  1,000 mg Intravenous Q8H Tae Charles MD   1,000 mg at 02/08/24 0557    Morphine sulfate (PF) injection 1 mg  1 mg Intravenous Q4H PRN Tae Charles MD   1 mg at 02/07/24 2247    multivitamin with minerals 1 tablet  1 tablet Oral Daily Tae Charles MD        ondansetron (ZOFRAN) injection 4 mg  4 mg Intravenous Q6H PRN Tae Charles MD        Pharmacy to Dose meropenem (MERREM)   Does not apply Continuous PRN Tae Charles MD        saccharomyces boulardii (FLORASTOR) capsule 250 mg  250 mg Per G Tube Daily Tae Charles MD        sodium chloride 0.9 % flush 10 mL  10 mL Intravenous PRN Dea Lopez MD        sodium chloride 0.9 % flush 10 mL  10 mL Intravenous Q12H Tae Charles MD   10 mL at 02/07/24 2114    sodium chloride 0.9 % flush 10 mL  10 mL Intravenous PRN Tae Charles MD        sodium chloride 0.9 % infusion 40 mL  40 mL Intravenous PRN Tae Charles MD            Allergies   Allergen Reactions    Coconut Unknown - High Severity    Levaquin [Levofloxacin] Other (See Comments)     unknown    Nuts  Unknown - High Severity    Penicillins     [place] Other (See Comments)     Causes migraines per pt reports       Objective     Vital Signs   Temp:  [99 °F (37.2 °C)-103.4 °F (39.7 °C)] 99.9 °F (37.7 °C)  Heart Rate:  [120-156] 135  Resp:  [18-32] 21  BP: (107-133)/(63-99) 132/98    Intake/Output Summary (Last 24 hours) at 2/8/2024 0847  Last data filed at 2/8/2024 0400  Gross per 24 hour   Intake --   Output 750 ml   Net -750 ml       Physical Exam:    General: Alert, cooperative, nontoxic, comfortable  Head:  Normocephalic, without obvious abnormality, atraumatic  Eyes:   Lids and lashes normal, no icterus, no pallor  Ears:  Ears appear intact with no abnormalities noted  Neck:  Supple  Back:  No costovertebral angle tenderness  Lungs: Unlabored respirations  Heart:  Regular rhythm and normal rate  Abdomen: Soft, nontender, nondistended  :  Nephrostomy tube appears in place but does not appear to be draining much and what it is draining is thick yellow. A Mojica is in place.  Extremities: Moves all extremities well  Skin:  Warm and dry  Neurologic: Cranial nerves 2 - 12 grossly intact    Data Review:    See above    Assessment and Plan:    Sepsis with likely UTI: Numerous risk factors for UTI. Continue broad spectrum IV antibiotics. Use last urine culture from last January 2024 to help dictate empiric antibiotics choice. ID on board, defer treatment to them. If Mojica hasn't been exchanged in the last 4 weeks, recommend exchange.     Indwelling left nephroureteral stent: This is due for exchange. Will need to be performed at  or [place]. Would recommend stabilizing the patient here and then transfer to one of these facilities for care. Our facility is unable to address her complex urologic diagnoses.     Right kidney stones: Nonobstructing. Will need to be address at one of the above tertiary facilities.     UROLOGICAL DISPOSITION: Will need to transfer to  of [place] upon stabilization.    I discussed  the patient's findings and my recommendations with nursing staff and Dr. Charles    (Please note that portions of this note were completed with a voice recognition program.)    Junior Dyer MD  02/08/24  08:47 CST    Time: Time spent: 45 minutes spent performing evaluation and management, chart review, and discussion with patient, > 50% of time spent in face-to-face encounter

## 2024-02-09 LAB
ANION GAP SERPL CALCULATED.3IONS-SCNC: 7 MMOL/L (ref 5–15)
ANION GAP SERPL CALCULATED.3IONS-SCNC: 8 MMOL/L (ref 5–15)
BACTERIA SPEC AEROBE CULT: ABNORMAL
BACTERIA SPEC AEROBE CULT: NORMAL
BACTERIA SPEC AEROBE CULT: NORMAL
BASOPHILS # BLD AUTO: 0.05 10*3/MM3 (ref 0–0.2)
BASOPHILS NFR BLD AUTO: 0.7 % (ref 0–1.5)
BUN SERPL-MCNC: 34 MG/DL (ref 6–20)
BUN SERPL-MCNC: 39 MG/DL (ref 6–20)
BUN/CREAT SERPL: 72.2 (ref 7–25)
BUN/CREAT SERPL: 73.9 (ref 7–25)
CALCIUM SPEC-SCNC: 9.7 MG/DL (ref 8.6–10.5)
CALCIUM SPEC-SCNC: 9.8 MG/DL (ref 8.6–10.5)
CHLORIDE SERPL-SCNC: 115 MMOL/L (ref 98–107)
CHLORIDE SERPL-SCNC: 118 MMOL/L (ref 98–107)
CO2 SERPL-SCNC: 26 MMOL/L (ref 22–29)
CO2 SERPL-SCNC: 26 MMOL/L (ref 22–29)
CREAT SERPL-MCNC: 0.46 MG/DL (ref 0.57–1)
CREAT SERPL-MCNC: 0.54 MG/DL (ref 0.57–1)
DEPRECATED RDW RBC AUTO: 55.8 FL (ref 37–54)
EGFRCR SERPLBLD CKD-EPI 2021: 115.2 ML/MIN/1.73
EGFRCR SERPLBLD CKD-EPI 2021: 119.7 ML/MIN/1.73
EOSINOPHIL # BLD AUTO: 0.08 10*3/MM3 (ref 0–0.4)
EOSINOPHIL NFR BLD AUTO: 1.1 % (ref 0.3–6.2)
ERYTHROCYTE [DISTWIDTH] IN BLOOD BY AUTOMATED COUNT: 14.8 % (ref 12.3–15.4)
GLUCOSE BLDC GLUCOMTR-MCNC: 119 MG/DL (ref 70–130)
GLUCOSE BLDC GLUCOMTR-MCNC: 121 MG/DL (ref 70–130)
GLUCOSE BLDC GLUCOMTR-MCNC: 134 MG/DL (ref 70–130)
GLUCOSE SERPL-MCNC: 122 MG/DL (ref 65–99)
GLUCOSE SERPL-MCNC: 172 MG/DL (ref 65–99)
GRAM STN SPEC: ABNORMAL
HCT VFR BLD AUTO: 34.5 % (ref 34–46.6)
HGB BLD-MCNC: 10.3 G/DL (ref 12–15.9)
IMM GRANULOCYTES # BLD AUTO: 0.03 10*3/MM3 (ref 0–0.05)
IMM GRANULOCYTES NFR BLD AUTO: 0.4 % (ref 0–0.5)
ISOLATED FROM: ABNORMAL
LYMPHOCYTES # BLD AUTO: 1.61 10*3/MM3 (ref 0.7–3.1)
LYMPHOCYTES NFR BLD AUTO: 21.2 % (ref 19.6–45.3)
MAGNESIUM SERPL-MCNC: 2.6 MG/DL (ref 1.6–2.6)
MCH RBC QN AUTO: 31 PG (ref 26.6–33)
MCHC RBC AUTO-ENTMCNC: 29.9 G/DL (ref 31.5–35.7)
MCV RBC AUTO: 103.9 FL (ref 79–97)
MONOCYTES # BLD AUTO: 0.48 10*3/MM3 (ref 0.1–0.9)
MONOCYTES NFR BLD AUTO: 6.3 % (ref 5–12)
NEUTROPHILS NFR BLD AUTO: 5.34 10*3/MM3 (ref 1.7–7)
NEUTROPHILS NFR BLD AUTO: 70.3 % (ref 42.7–76)
NRBC BLD AUTO-RTO: 0 /100 WBC (ref 0–0.2)
PHOSPHATE SERPL-MCNC: 2 MG/DL (ref 2.5–4.5)
PLATELET # BLD AUTO: 158 10*3/MM3 (ref 140–450)
PMV BLD AUTO: 11.5 FL (ref 6–12)
POTASSIUM SERPL-SCNC: 3.5 MMOL/L (ref 3.5–5.2)
POTASSIUM SERPL-SCNC: 4 MMOL/L (ref 3.5–5.2)
RBC # BLD AUTO: 3.32 10*6/MM3 (ref 3.77–5.28)
SODIUM SERPL-SCNC: 148 MMOL/L (ref 136–145)
SODIUM SERPL-SCNC: 152 MMOL/L (ref 136–145)
WBC NRBC COR # BLD AUTO: 7.59 10*3/MM3 (ref 3.4–10.8)

## 2024-02-09 PROCEDURE — 80048 BASIC METABOLIC PNL TOTAL CA: CPT | Performed by: FAMILY MEDICINE

## 2024-02-09 PROCEDURE — 99232 SBSQ HOSP IP/OBS MODERATE 35: CPT | Performed by: INTERNAL MEDICINE

## 2024-02-09 PROCEDURE — 25010000002 HEPARIN (PORCINE) PER 1000 UNITS: Performed by: FAMILY MEDICINE

## 2024-02-09 PROCEDURE — 83735 ASSAY OF MAGNESIUM: CPT | Performed by: FAMILY MEDICINE

## 2024-02-09 PROCEDURE — 25810000003 DEXTROSE 5 % WITH KCL 20 MEQ 20 MEQ/L SOLUTION: Performed by: FAMILY MEDICINE

## 2024-02-09 PROCEDURE — 94799 UNLISTED PULMONARY SVC/PX: CPT

## 2024-02-09 PROCEDURE — 82948 REAGENT STRIP/BLOOD GLUCOSE: CPT

## 2024-02-09 PROCEDURE — 36415 COLL VENOUS BLD VENIPUNCTURE: CPT | Performed by: FAMILY MEDICINE

## 2024-02-09 PROCEDURE — 84100 ASSAY OF PHOSPHORUS: CPT | Performed by: FAMILY MEDICINE

## 2024-02-09 PROCEDURE — 99232 SBSQ HOSP IP/OBS MODERATE 35: CPT | Performed by: CLINICAL NURSE SPECIALIST

## 2024-02-09 PROCEDURE — 25010000002 MEROPENEM PER 100 MG: Performed by: FAMILY MEDICINE

## 2024-02-09 PROCEDURE — 85025 COMPLETE CBC W/AUTO DIFF WBC: CPT | Performed by: FAMILY MEDICINE

## 2024-02-09 RX ORDER — POTASSIUM CHLORIDE 20MEQ/15ML
20 LIQUID (ML) ORAL ONCE
Status: COMPLETED | OUTPATIENT
Start: 2024-02-09 | End: 2024-02-09

## 2024-02-09 RX ADMIN — Medication 1 APPLICATION: at 09:07

## 2024-02-09 RX ADMIN — BACLOFEN 10 MG: 10 TABLET ORAL at 04:54

## 2024-02-09 RX ADMIN — MEROPENEM 1000 MG: 1 INJECTION, POWDER, FOR SOLUTION INTRAVENOUS at 22:11

## 2024-02-09 RX ADMIN — POTASSIUM CHLORIDE 20 MEQ: 20 SOLUTION ORAL at 08:55

## 2024-02-09 RX ADMIN — BACLOFEN 10 MG: 10 TABLET ORAL at 15:30

## 2024-02-09 RX ADMIN — MEROPENEM 1000 MG: 1 INJECTION, POWDER, FOR SOLUTION INTRAVENOUS at 12:49

## 2024-02-09 RX ADMIN — BACLOFEN 10 MG: 10 TABLET ORAL at 08:54

## 2024-02-09 RX ADMIN — MEROPENEM 1000 MG: 1 INJECTION, POWDER, FOR SOLUTION INTRAVENOUS at 05:01

## 2024-02-09 RX ADMIN — FAMOTIDINE 20 MG: 10 INJECTION INTRAVENOUS at 09:07

## 2024-02-09 RX ADMIN — FAMOTIDINE 20 MG: 10 INJECTION INTRAVENOUS at 22:14

## 2024-02-09 RX ADMIN — POTASSIUM CHLORIDE AND DEXTROSE MONOHYDRATE 100 ML/HR: 150; 5 INJECTION, SOLUTION INTRAVENOUS at 09:30

## 2024-02-09 RX ADMIN — Medication 1 APPLICATION: at 22:14

## 2024-02-09 RX ADMIN — HEPARIN SODIUM 5000 UNITS: 5000 INJECTION INTRAVENOUS; SUBCUTANEOUS at 22:15

## 2024-02-09 RX ADMIN — Medication 10 ML: at 22:15

## 2024-02-09 RX ADMIN — Medication 10 ML: at 08:55

## 2024-02-09 RX ADMIN — BACLOFEN 10 MG: 10 TABLET ORAL at 22:15

## 2024-02-09 RX ADMIN — HEPARIN SODIUM 5000 UNITS: 5000 INJECTION INTRAVENOUS; SUBCUTANEOUS at 08:54

## 2024-02-09 RX ADMIN — Medication 250 MG: at 08:54

## 2024-02-09 RX ADMIN — Medication 1 TABLET: at 08:54

## 2024-02-09 RX ADMIN — CHLORHEXIDINE GLUCONATE 1 APPLICATION: 500 CLOTH TOPICAL at 04:54

## 2024-02-09 NOTE — PROGRESS NOTES
[facility] Medicine Services  INPATIENT PROGRESS NOTE    Patient Name: Namita Zabala  Date of Admission: 2/7/2024  Today's Date: 02/09/24  Length of Stay: 2  Primary Care Physician: David Lara MD    Subjective   Chief Complaint: Fevers  Fever        48-year-old female with history of anoxic brain injury, spastic quadriplegia, percutaneous endoscopic gastrostomy tube placement, severe malnutrition, frequent urinary tract infections with secondary sepsis, was admitted to the hospital December 2023 and treated with broad-spectrum antibiotics; history of mitral valve prolapse, nephrolithiasis, with a left percutaneous nephroureteral drain in place, last exchanged documented on January 2023.      Patient came from [facility]; as per family member patient has had a malfunctioning percutaneous gastrostomy tube for several days, and approximately 3 days ago developed fevers.  There is no other information available at this time.  Not able to provide any history due to neurological condition.    She was admitted to intensive care unit.  On broad-spectrum antibiotics.    No family member present today.    Discussed with family member yesterday afternoon.      No ventilatory support.    No pressor support.        Review of Systems   Constitutional:  Positive for fever.      All pertinent negatives and positives are as above. All other systems have been reviewed and are negative unless otherwise stated.     Objective    Temp:  [98.2 °F (36.8 °C)-100.5 °F (38.1 °C)] 98.2 °F (36.8 °C)  Heart Rate:  [] 80  Resp:  [13-20] 14  BP: ()/(50-77) 108/60  Physical Exam  Constitutional:       Appearance: Chronically ill-appearing.  Multiple contractures.  HENT:      Head: Normocephalic and atraumatic.      Nose: Nose normal.      Mouth/Throat:      Mouth: Mucous membranes are dry.     Pharynx: Able to evaluate  Eyes:      Extraocular Movements: Extraocular  movements preserved     Conjunctiva/sclera: Conjunctivae normal.      Pupils: Pupils are equal, round, and reactive to light.   Cardiovascular:      Rate and Rhythm: Tachycardic, normal rate and regular rhythm.      Pulses: Fast pulse  Pulmonary:      Effort: No tachypnea.     Breath sounds: Reduced breath sounds on the right due to positioning.  Abdominal:      General: Abdominal scar extensive, with no open wounds there is a percutaneous gastrostomy tube in place.  Abdomen is nondistended.  Bowel sounds are diminished.     Palpations: Abdomen is soft.      Tenderness: There is no guarding or rebound.   Musculoskeletal:         General: Multiple upper and lower extremity flexion contractures; decreased range of motion.    Extremities: Multiple contractures as per musculoskeletal exam; no lower extremity edema.  Skin:     Capillary Refill: Capillary refill takes less than 2 seconds.      Coloration: Skin is not jaundiced.      Findings: No rash.   Neurological:      General: Spastic quadriplegia.  I am not able to assess patient's orientation or memory.  Speech:  with aphasia.   Psychiatric evaluation not able to be performed.      Results Review:  I have reviewed the labs, radiology results, and diagnostic studies.    Laboratory Data:   Results from last 7 days   Lab Units 02/09/24  0316 02/08/24  0342 02/07/24  1416   WBC 10*3/mm3 7.59 10.47 15.14*   HEMOGLOBIN g/dL 10.3* 12.9 13.3   HEMATOCRIT % 34.5 47.9* 42.6   PLATELETS 10*3/mm3 158 210 312        Results from last 7 days   Lab Units 02/09/24  0317 02/08/24  1423 02/08/24  0342 02/07/24  1743 02/07/24  1411   SODIUM mmol/L 152* 156* 154*   < > 161*   POTASSIUM mmol/L 3.5 3.7 3.4*   < > 4.0   CHLORIDE mmol/L 118* 119* 118*   < > 115*   CO2 mmol/L 26.0 27.0 20.0*   < > 27.0   BUN mg/dL 39* 49* 53*   < > 56*   CREATININE mg/dL 0.54* 0.69 0.91   < > 0.97   CALCIUM mg/dL 9.8 9.8 10.1   < > 11.4*   BILIRUBIN mg/dL  --   --   --   --  0.9   ALK PHOS U/L  --   --   --    --  62   ALT (SGPT) U/L  --   --   --   --  13   AST (SGOT) U/L  --   --   --   --  20   GLUCOSE mg/dL 172* 163* 157*   < > 161*    < > = values in this interval not displayed.       Culture Data:   Blood Culture   Date Value Ref Range Status   02/07/2024 Abnormal Stain (C)  Preliminary       Radiology Data:   Imaging Results (Last 24 Hours)       Procedure Component Value Units Date/Time    XR Abdomen KUB [[id number]] Collected: 02/08/24 1322     Updated: 02/08/24 1327    Narrative:      EXAM: XR ABDOMEN KUB-      DATE: 2/8/2024 12:22 PM     HISTORY: Document correct placement of PEG tube replacement;      COMPARISON: 11/27/2023.     TECHNIQUE:   Frontal view of the abdomen. 1 image.     FINDINGS:    Contrast was injected into the patient's G-tube. Contrast opacifies the  stomach. There is no abnormal leakage of contrast. Bowel gas pattern  visualized is nonspecific with borderline air-filled loops of small  bowel and probable bowel gas distally in the colon. This may represent  ileus. Left ureteral stent is partially imaged.          Impression:         1. Contrast in the stomach with no abnormal leakage.  2. Probable ileus.     This report was signed and finalized on 2/8/2024 1:24 PM by Dominguez Barton.               I have reviewed the patient's current medications.     Assessment/Plan   Assessment  Active Hospital Problems    Diagnosis     **Severe sepsis     History of anoxic brain injury     Hypernatremia     Infection associated with nephrostomy catheter     Functional quadriplegia     Dysphagia     Severe malnutrition     Complicated UTI (urinary tract infection)      Severe sepsis  Recurrent urinary tract infection, complicated, associated to nephrostomy tube/indwelling catheter/ureteral stent  Status post replacement of 18 Fr percutaneous endoscopic gastrostomy tube 2/08/24  Hypernatremia, improving  Mild hypokalemia, improving  History of anoxic brain injury 2022  Chronic left nephrostomy tube in  place  Spastic quadriplegia  History of pulmonary embolism in 2021  History of COVID-19 in 2021        Sodium improved from 162 to 154, now 152.    Potassium 3.5.  BUN 39    Creatinine 0.5.  Glucose 172.    White blood cell count improved from 15,000 to 7.500       Last urine culture from January 29, 2024:  E. coli ESBL sensitive to ertapenem and meropenem  Proteus mirabilis resistant to quinolones and nitrofurantoin  Pseudomonas resistant to Levaquin.      CT scan report  1. A left-sided nephrostomy tube and ureteral stent remains in place and  well-positioned. There is mild progressive distention of the upper  tracts of the left kidney as well as of the proximal and mid left ureter  when compared to the previous exam. There is associated pyeloureteritis  with urothelial thickening and stranding surrounding the left renal  pelvis and proximal ureter. Dysfunction of the indwelling stent is  suspected. The right kidney demonstrates nonobstructing nephrolithiasis.  No right-sided ureteral stone is present.  2. Large volume of stool within the colon. No fecal impaction within the  rectal vault. The stomach is moderately distended with air and fluid  with an air-fluid level but without definite gastric wall thickening or  convincing evidence of gastric outlet obstruction. No evidence of  mechanical obstruction.  3. Just to the left of midline at the umbilicus there is a small  abdominal wall defect. Slight protrusion of a portion of the transverse  colon into this defect is present without incarceration or obstruction.  4. The uterus and adnexa are unremarkable..     No fevers in 24 hours  Blood culture: staph coag negative x 1    Treatment Plan  Continue meropenem IV, started January 7, 2024  Continue IV fluids D5 + KCl, 100 MLS per hour.    S/P percutaneous gastrostomy tube exchange. Currently on enteral feedings.  Follow nutritional recommendations.   Follow blood and urine cultures.  Follow electrolytes  Mojica  catheter exchanged  2/8/2024.      Urology consult noted.  Patient will need transfer for left nephroureteral stent exchange.       yesterday provided to me information regarding follow-up recommendations by patient's urologist Dr. Rey.  Transfer process has been started with .  Discussed with [facility] transfer center today; I have been informed that urology specialist on call from this institution will be contacted to present the case.     Heparin subcutaneous for DVT prophylaxis.  Prognosis is poor given functional status.      Discussed at length with  yesterday.    Medical Decision Making  Number and Complexity of problems: 10, high complexity  Differential Diagnosis: See above    Conditions and Status        Condition is improving.     MDM Data  External documents reviewed: None  Cardiac tracing (EKG, telemetry) interpretation: Reviewed, sinus tachycardia  Radiology interpretation: Radiology reports reviewed  Labs reviewed: Yes  Any tests that were considered but not ordered: No     Decision rules/scores evaluated (example [id number], [place], etc): None     Discussed with: Nurse staff and specialists     Care Planning  Shared decision making: With family  Code status and discussions: No chest compressions.    Disposition  Social Determinants of Health that impact treatment or disposition: Nursing home.  Bedbound.  I expect the patient to be transferred for nephroureteral stent exchange.    Electronically signed by Tae Charles MD, 02/09/24, 08:43 CST.             Addendum February 9, 2024.  4 PM    I discussed the case with [facility] urology physician on-call Dr. Bond.  After presenting the case, I received a call back; given urinary infection is being treated, patient is not septic and improving, transfer has been declined and the removal/exchange of the nephroureteral stent can be scheduled in the outpatient setting.      Continue antibiotic treatment.    Follow  cultures.  Patient has had clinical improvement.      Will transfer patient to the floor.    I have asked  to help setting up appointment at [facility], given that patient may have recurrent infection and sepsis.  Update discussed with infectious disease specialist as well.      Electronically signed by Tae Charles MD, 02/09/24, 4:23 PM CST.         Addendum 2/09/2024.  4:40 PM  I was called back from the transfer center.  Dr. Bond contacted Dr. Rey, recommending exchange of this stent soon.  I have been told to attempt transfer to  for exchange or restart transfer to [facility] on Monday.  Patient will continue antibiotic treatment and will attempt transfer on Monday.    Electronically signed by Tae Charles MD, 02/09/24, 4:45 PM CST.

## 2024-02-09 NOTE — PROGRESS NOTES
RT EQUIPMENT DEVICE RELATED - SKIN ASSESSMENT    Pritesh Score:  Pritesh Score: 10     RT Medical Equipment/Device:     Tracheostomy - Are sutures present:  No    Skin Assessment:      Neck:  Incision and Intact    Device Skin Pressure Protection:  Skin-to-device areas padded:  Trach Tie    Nurse Notification:  No    Myah Carter, RRT

## 2024-02-09 NOTE — CASE MANAGEMENT/SOCIAL WORK
Continued Stay Note  [facility]     Patient Name: Namita Zabala  MRN: [id number]  Today's Date: 2/9/2024    Admit Date: 2/7/2024    Plan: Acute care transfer pending - [facility]   Discharge Plan       Row Name 02/09/24 1500       Plan    Plan Acute care transfer pending - [facility]    Plan Comments SW contacted [facility] for update [phone], was advised that Dr. Bond (sp?) at [facility], was reviewing and would call back.                   Discharge Codes    No documentation.                       CAMILA Gonzalez

## 2024-02-09 NOTE — PROGRESS NOTES
INFECTIOUS DISEASES PROGRESS NOTE    Patient:  Namita Zabala  YOB: 1975  MRN: [id number]   Admit date: 2/7/2024   Admitting Physician: Tae Charles MD  Primary Care Physician: David Lara MD    Chief Complaint: non verbal      Interval History: Patient had PEG tube changed yesterday per GI.  It was functioning and she is currently getting tube feeding.    Heart rate finally normalized.    Plan is to try to transfer patient to [facility] to manage ureteral stents    Blood culture called positive yesterday.  Coagulase-negative staph.  No further positive.  Consistent with contaminant    Allergies:   Allergies   Allergen Reactions    Coconut Unknown - High Severity    Levaquin [Levofloxacin] Other (See Comments)     unknown    Nuts Unknown - High Severity    Penicillins Rash     Patient's father noted patient had taken penicillin, many years ago, many times and then started developing a rash when she would take it.  She has received cefepime, ceftriaxone and cephalexin with no known adverse problems    Turkey Other (See Comments)     Causes migraines per pt reports       Current Scheduled Medications:   baclofen, 10 mg, Per G Tube, Q6H  Chlorhexidine Gluconate Cloth, 1 Application, Topical, Q24H  famotidine, 20 mg, Intravenous, Q12H  heparin (porcine), 5,000 Units, Subcutaneous, Q12H  meropenem, 1,000 mg, Intravenous, Q8H  multivitamin with minerals, 1 tablet, Oral, Daily  mupirocin, 1 application , Each Nare, BID  potassium chloride, 20 mEq, Per PEG Tube, Once  saccharomyces boulardii, 250 mg, Per G Tube, Daily  sodium chloride, 10 mL, Intravenous, Q12H      Current PRN Medications:    acetaminophen    albuterol    artificial tears    senna-docusate sodium **AND** polyethylene glycol **AND** bisacodyl **AND** bisacodyl    hydrOXYzine    Morphine    ondansetron    Pharmacy to Dose meropenem (MERREM)    [COMPLETED] Insert Peripheral IV **AND** sodium  "chloride    sodium chloride    sodium chloride    dextrose 5 % with KCl 20 mEq, 100 mL/hr, Last Rate: 100 mL/hr (24 1720)  Pharmacy to Dose meropenem (MERREM),            Objective     Vital Signs:  Temp (24hrs), Av.4 °F (37.4 °C), Min:98.2 °F (36.8 °C), Max:100.5 °F (38.1 °C)      /57   Pulse 74   Temp 98.2 °F (36.8 °C) (Rectal)   Resp 14   Ht 152.4 cm (60\")   Wt 54.5 kg (120 lb 2.4 oz)   LMP  (LMP Unknown)   SpO2 100%   BMI 23.47 kg/m²         Physical Exam:  General: Patient is chronically ill contracted female lying in bed.  Heart: S1-S2 rate 75 and regular  Neck: Trach in place with trach collar  Abdomen: G-tube in place with without any drainage or significant erythema noted  Extremities: Contracted  Line/IV site: Peripheral IVs    Results Review:    I reviewed the patient's new clinical results.    Lab Results:    CBC:   Lab Results   Lab 24   WBC 15.14* 10.47 7.59   HEMOGLOBIN 13.3 12.9 10.3*   HEMATOCRIT 42.6 47.9* 34.5   PLATELETS 312 210 158        AutoDiff:   Lab Results   Lab 24  0316   NEUTROPHIL % 86.1* 69.7 70.3   LYMPHOCYTE % 7.5* 19.1* 21.2   MONOCYTES % 5.6 10.2 6.3   EOSINOPHIL % 0.0* 0.0* 1.1   BASOPHIL % 0.5 0.5 0.7   NEUTROS ABS 13.04* 7.30* 5.34   LYMPHS ABS 1.14 2.00 1.61   MONOS ABS 0.85 1.07* 0.48   EOS ABS 0.00 0.00 0.08   BASOS ABS 0.07 0.05 0.05        Manual Diff:    Lab Results   Lab 24  0342 24  0316   NEUTROS ABS 13.04* 7.30* 5.34           CMP:   Lab Results   Lab 24  14124  1743 24  0342 24  1423 24  0317   SODIUM 161*   < > 154* 156* 152*   POTASSIUM 4.0   < > 3.4* 3.7 3.5   CHLORIDE 115*   < > 118* 119* 118*   CO2 27.0   < > 20.0* 27.0 26.0   BUN 56*   < > 53* 49* 39*   CREATININE 0.97   < > 0.91 0.69 0.54*   CALCIUM 11.4*   < > 10.1 9.8 9.8   BILIRUBIN 0.9  --   --   --   --    ALK PHOS 62  --   --   --   --    ALT " (SGPT) 13  --   --   --   --    AST (SGOT) 20  --   --   --   --    GLUCOSE 161*   < > 157* 163* 172*    < > = values in this interval not displayed.       Estimated Creatinine Clearance: 112 mL/min (A) (by C-G formula based on SCr of 0.54 mg/dL (L)).    Culture Results:    Microbiology Results (last 10 days)       Procedure Component Value - Date/Time    Miscellaneous Micro Request - Specimen Not Sent, Specimen Not Sent [[id number]] Resulted: 02/08/24 0731    Lab Status: Final result Specimen: Specimen Not Sent Updated: 02/08/24 0731     Extra Tube --    Blood Culture - Blood, Hand, Digit Right [[id number]]  (Abnormal) Collected: 02/07/24 1420    Lab Status: Final result Specimen: Blood from Hand, Digit Right Updated: 02/09/24 0513     Blood Culture Staphylococcus, coagulase negative     Isolated from Pediatric Bottle     Gram Stain Pediatric Bottle Gram positive cocci in clusters    Narrative:      Probable contaminant requires clinical correlation, susceptibility not performed unless requested by physician.      Blood Culture ID, PCR - Blood, Hand, Digit Right [[id number]]  (Abnormal) Collected: 02/07/24 1420    Lab Status: Final result Specimen: Blood from Hand, Digit Right Updated: 02/08/24 1023     BCID, PCR Staph spp, not aureus or lugdunensis. Identification by BCID2 PCR.     BOTTLE TYPE Pediatric Bottle    Blood Culture - Blood, Foot, Right [[id number]]  (Normal) Collected: 02/07/24 1228    Lab Status: Preliminary result Specimen: Blood from Foot, Right Updated: 02/08/24 1301     Blood Culture No growth at 24 hours    Urine Culture - Urine, Urine, Catheter [[id number]]  (Normal) Collected: 02/07/24 1218    Lab Status: Preliminary result Specimen: Urine, Catheter Updated: 02/08/24 0914     Urine Culture Culture in progress    COVID-19, FLU A/B, RSV PCR 1 HR TAT - Swab, Nasopharynx [[id number]]  (Normal) Collected: 02/07/24 1215    Lab Status: Final result Specimen: Swab from Nasopharynx Updated: 02/07/24 1340      COVID19 Not Detected     Influenza A PCR Not Detected     Influenza B PCR Not Detected     RSV, PCR Not Detected    Narrative:      Fact sheet for providers: https://www.fda.gov/media/294637/download    Fact sheet for patients: https://www.fda.gov/media/593457/download    Test performed by PCR.             01/29/2024 0413 02/08/2024 0731 Urine Culture - Urine, Indwelling Urethral Catheter [[id number]]     (Abnormal)   Urine from Indwelling Urethral Catheter    Edited Result - FINAL Component Value   Urine Culture >100,000 CFU/mL Escherichia coli ESBL Abnormal       Consider infectious disease consult.  Susceptibility results may not correlate to clinical outcomes.    >100,000 CFU/mL Proteus mirabilis Abnormal     >100,000 CFU/mL Pseudomonas aeruginosa Abnormal        Susceptibility     Escherichia coli ESBL Proteus mirabilis Pseudomonas aeruginosa     SANDEEP SANDEEP SANDEEP     Amikacin 4 Susceptible         Amoxicillin + Clavulanate   <=2 Susceptible       Ampicillin   <=2 Susceptible       Ampicillin + Sulbactam   <=2 Susceptible       Cefepime   <=1 Susceptible 8 Susceptible     Ceftazidime   <=1 Susceptible 4 Susceptible     Ceftriaxone   <=1 Susceptible       Ciprofloxacin     1 Intermediate     Ertapenem <=0.5 Susceptible <=0.5 Susceptible (C) 1       Gentamicin >=16 Resistant <=1 Susceptible       Imipenem   2 Intermediate (C) 1 <=0.25 Susceptible (C) 1     Levofloxacin >=8 Resistant 4 Resistant 4 Resistant     Meropenem <=0.25 Susceptible <=0.25 Susceptible (C) 1 <=0.25 Susceptible (C) 1     Nitrofurantoin <=16 Susceptible 128 Resistant       Piperacillin + Tazobactam <=4 Susceptible <=4 Susceptible 8 Susceptible     Tobramycin >=16 Resistant   <=1 Susceptible     Trimethoprim + Sulfamethoxazole >=320 Resistant <=20 Susceptible                    1 Appended report. These results have been appended to a previously final verified report.             Radiology:     Contrast injected into the G-tube.  Film shows no evidence  of extravasation or leakage of the contrast.    Imaging Results (Last 72 Hours)       Procedure Component Value Units Date/Time    XR Abdomen KUB [[id number]] Collected: 02/08/24 1322     Updated: 02/08/24 1327    Narrative:      EXAM: XR ABDOMEN KUB-      DATE: 2/8/2024 12:22 PM     HISTORY: Document correct placement of PEG tube replacement;      COMPARISON: 11/27/2023.     TECHNIQUE:   Frontal view of the abdomen. 1 image.     FINDINGS:    Contrast was injected into the patient's G-tube. Contrast opacifies the  stomach. There is no abnormal leakage of contrast. Bowel gas pattern  visualized is nonspecific with borderline air-filled loops of small  bowel and probable bowel gas distally in the colon. This may represent  ileus. Left ureteral stent is partially imaged.          Impression:         1. Contrast in the stomach with no abnormal leakage.  2. Probable ileus.     This report was signed and finalized on 2/8/2024 1:24 PM by Dominguez Barton.       CT Abdomen Pelvis Without Contrast [[id number]] Collected: 02/07/24 1720     Updated: 02/07/24 1733    Narrative:      CT ABDOMEN PELVIS WO CONTRAST- 2/7/2024 3:49 PM     HISTORY: Sepsis; R50.9-Fever, unspecified; N39.0-Urinary tract  infection, site not specified; A41.9-Sepsis, unspecified organism;  E87.0-Hyperosmolality and hypernatremia      COMPARISON: 1/29/2024     DLP: 784.68 mGy.cm . All CT scans are performed using dose optimization  techniques as appropriate to the performed exam and including at least  one of the following: Automated exposure control, adjustment of the mA  and/or kV according to size, and the use of the iterative reconstruction  technique.     TECHNIQUE: Noncontrast enhanced images of the abdomen and pelvis  obtained without oral contrast. The study is degraded by motion  artifact.     FINDINGS:  Bibasilar atelectasis is present. The base of the heart is unremarkable.  There is no pericardial effusion..     LIVER: No focal liver lesion.      BILIARY SYSTEM: The gallbladder is unremarkable. No intrahepatic or  extrahepatic ductal dilatation.     PANCREAS: No focal pancreatic lesion.     SPLEEN: Unremarkable.     KIDNEYS AND ADRENALS: The adrenals are unremarkable. Bilateral  nonobstructing nephrolithiasis is present. A left-sided nephrostomy tube  appears to be well positioned within the renal pelvis. A left-sided  ureteral stent is also in place. There is progressive distention of the  upper tracts of the left kidney and proximal left ureter in comparison  to the previous exam. There is again evidence of pyeloureteritis with  periureteral stranding and thickening of the urothelium.. The right  ureter is decompressed and normal in appearance with no evidence of  ureteral calculus..     RETROPERITONEUM: No mass, lymphadenopathy or hemorrhage.     GI TRACT: A percutaneous gastrostomy feeding tube is in place. It is  well-positioned. The stomach is moderately distended and air-filled. No  evidence of gastric wall thickening or definite gastric outlet  obstruction. There is a large volume of stool throughout the colon which  could be contributing to stasis. A left paramedian ventral wall hernia  is noted at the umbilicus. There is slight protrusion of a portion of  the transverse colon into this defect without evidence of incarceration  or obstruction. There is increased stool within the colon to just above  the level of the rectum. No fecal impaction. No significant small bowel  distention.. The appendix is not clearly identified..     OTHER: There is no mesenteric mass, lymphadenopathy or fluid collection.  The osseous structures and soft tissues demonstrate no worrisome  lesions. No free fluid is present.     PELVIS: A Mojica catheter is in place within the bladder with the urinary  bladder decompressed.. The uterus and adnexa are unremarkable. No free  fluid in the cul-de-sac..       Impression:      1. A left-sided nephrostomy tube and ureteral stent  remains in place and  well-positioned. There is mild progressive distention of the upper  tracts of the left kidney as well as of the proximal and mid left ureter  when compared to the previous exam. There is associated pyeloureteritis  with urothelial thickening and stranding surrounding the left renal  pelvis and proximal ureter. Dysfunction of the indwelling stent is  suspected. The right kidney demonstrates nonobstructing nephrolithiasis.  No right-sided ureteral stone is present.  2. Large volume of stool within the colon. No fecal impaction within the  rectal vault. The stomach is moderately distended with air and fluid  with an air-fluid level but without definite gastric wall thickening or  convincing evidence of gastric outlet obstruction. No evidence of  mechanical obstruction.  3. Just to the left of midline at the umbilicus there is a small  abdominal wall defect. Slight protrusion of a portion of the transverse  colon into this defect is present without incarceration or obstruction.  4. The uterus and adnexa are unremarkable..           This report was signed and finalized on 2/7/2024 5:30 PM by Dr. Yuniel De Jesus MD.       XR Chest 1 View [[id number]] Collected: 02/07/24 1244     Updated: 02/07/24 1250    Narrative:      EXAMINATION: XR CHEST 1 VW-  2/7/2024 12:44 PM 1 view     HISTORY: fever     COMPARISON: 1/29/2024     TECHNIQUE: A single frontal view of the chest was obtained.     FINDINGS:  This examination is limited by positioning of the patient's head over  the RIGHT upper lung/chest. This significantly limits evaluation of the  upper half of the RIGHT lung. The remainder of the lungs are clear. The  heart is not enlarged. A tracheostomy tube is in place. Some gaseous  distention of loops of bowel in the upper abdomen       Impression:         1.  Exam limited by patient positioning as the patient's head obscures  much of the RIGHT hemithorax. The remaining visualized portion of the  lungs  "are clear.          2.  There are gaseous distended loops of bowel in the upper abdomen.           This report was signed and finalized on 2/7/2024 12:47 PM by Dr. Taj Bergeron MD.                   Active Hospital Problems    Diagnosis     **Severe sepsis     History of anoxic brain injury     Hypernatremia     Infection associated with nephrostomy catheter     Functional quadriplegia     Dysphagia     Severe malnutrition     Complicated UTI (urinary tract infection)        IMPRESSION:  Sepsis secondary to complicated urinary tract infection.  Patient's heart rate has improved.  Elevated temperature has improved.  Blood pressure trended down some.  Mean remains at or above 65.  Patient has tube feeding started back at 25 mL an hour with a goal of 60 or 65 per report.  Patient was without tube feedings at the nursing home for a few days due to malfunctioning tube.  Urine culture from January 29, 2024 positive for ESBL E. coli, Proteus mirabilis and Pseudomonas aeruginosa.  It does not appear antibiotics were changed as an outpatient to cover these organisms based on documentations of \"medications prior to admission\" still still listed Keflex which she was discharged from the emergency department on  Left ureteral stent overdue for exchange.  Nonobstructing right renal stones  Anoxic brain injury  Presence of trach  Hypernatremia improving  Penicillin allergy-updated information received from patient's father that her penicillin reaction was that of a rash many years ago.  Review of records reveal she has received cefepime, ceftriaxone and cephalexin here at [facility].      RECOMMENDATION:   Continue meropenem to cover both the ESBL E. coli and Pseudomonas (ertapenem does not cover Pseudomonas)-reviewed updated susceptibilities from January 29 urine culture  Await urine culture collected from Mojica catheter in ER-this was collected from the port according to emergency department.  Follow-up on urine culture " obtained February 8 after her Mojica catheter change.  Expect to take some time for urine culture as she has previously had multiple organisms and these have to be  out, grown in.  Culture in order to identify and subsequently report susceptibilities.  Await possible transfer to [facility] for stent change    Discussed with Dr. Charles   Discussed with CARLEY Martinez MD  02/09/24  07:51 CST

## 2024-02-09 NOTE — PROGRESS NOTES
[facility] Palliative Care Services    Palliative Care Daily Progress Note   Chief complaint-follow up support for patient/family    Code Status:   Code Status and Medical Interventions:   Ordered at: 24 4309     Level Of Support Discussed With:    Health Care Surrogate     Code Status (Patient has no pulse and is not breathing):    No CPR (Do Not Attempt to Resuscitate)     Medical Interventions (Patient has pulse or is breathing):    Full Support      Advanced Directives: Advance Directive Status: Patient has advance directive, copy in chart   Goals of Care: Ongoing.     S: Medical record reviewed. Events noted.  Therapy has signed off, does not follow commands, multiple contractures.  Underwent successful replacement of PEG tube by Dr. Ramos on .  Infectious disease following.  Anticipating transfer to tertiary facility for ureteral stent exchange.  Urinalysis from yesterday shows 3+ blood/positive nitrite/3+ leukocytes/1+ bacteria.  Urine culture pending.  Hypernatremia trending downward.  Hemoglobin 10.3 from 12.9 yesterday. Laying in bed without apparent distress. Trach collar in place. No family at bedside.     Review of Systems   Unable to perform ROS: Acuity of condition     Pain Assessment  CPOT and PAINAD Scales: PAINAD (Pain Assessment in Advance Dementia Scale)  CPOT Facial Expression: 0-->relaxed, neutral  CPOT Body Movements: 0-->absence of movements  CPOT Muscle Tension: 0-->relaxed  Ventilator Compliance/Vocalization: 0-->talking in normal tone or no sound  CPOT Score: 0  PAINAD Breathin-->normal  PAINAD Negative Vocalization: 0-->none  PAINAD Facial Expression: 0-->smiling or inexpressive  PAINAD Body Language: 0-->relaxed  PAINAD Consolability: 0-->no need to console  PAINAD Score: 0    O:     Intake/Output Summary (Last 24 hours) at 2024 0837  Last data filed at 2024 0800  Gross per 24 hour   Intake 3084.92 ml   Output 750 ml   Net 2334.92 ml        Diagnostics and current medications: Reviewed.      Current Facility-Administered Medications:     acetaminophen (TYLENOL) suppository 325 mg, 325 mg, Rectal, Q4H PRN, Tae Charles MD, 325 mg at 02/08/24 0856    albuterol (PROVENTIL) nebulizer solution 0.083% 2.5 mg/3mL, 2.5 mg, Nebulization, Q4H PRN, Tae Charles MD    artificial tears ophthalmic ointment, , Both Eyes, Q1H PRN, Tae Charles MD    baclofen (LIORESAL) tablet 10 mg, 10 mg, Per G Tube, Q6H, Tae Charles MD, 10 mg at 02/09/24 0454    sennosides-docusate (PERICOLACE) 8.6-50 MG per tablet 2 tablet, 2 tablet, Oral, BID PRN **AND** polyethylene glycol (MIRALAX) packet 17 g, 17 g, Oral, Daily PRN **AND** bisacodyl (DULCOLAX) EC tablet 5 mg, 5 mg, Oral, Daily PRN **AND** bisacodyl (DULCOLAX) suppository 10 mg, 10 mg, Rectal, Daily PRN, Tae Charles MD    Chlorhexidine Gluconate Cloth 2 % pads 1 Application, 1 Application, Topical, Q24H, Tae Charles MD, 1 Application at 02/09/24 0454    dextrose 5 % with KCl 20 mEq/L infusion, 100 mL/hr, Intravenous, Continuous, Tae Charles MD, Last Rate: 100 mL/hr at 02/08/24 1720, 100 mL/hr at 02/08/24 1720    famotidine (PEPCID) injection 20 mg, 20 mg, Intravenous, Q12H, Tae Charles MD, 20 mg at 02/08/24 2020    heparin (porcine) 5000 UNIT/ML injection 5,000 Units, 5,000 Units, Subcutaneous, Q12H, Tae Charles MD, 5,000 Units at 02/08/24 2019    hydrOXYzine (ATARAX) tablet 25 mg, 25 mg, Per G Tube, Q6H PRN, Tae Charles MD    meropenem (MERREM) 1,000 mg in sodium chloride 0.9 % 100 mL IVPB, 1,000 mg, Intravenous, Q8H, Tae Charles MD, 1,000 mg at 02/09/24 0501    Morphine sulfate (PF) injection 1 mg, 1 mg, Intravenous, Q4H PRN, Tae Charles MD, 1 mg at 02/07/24 9700    multivitamin with minerals 1 tablet, 1 tablet, Oral, Daily, Tae Charles MD    mupirocin (BACTROBAN) 2 % nasal ointment 1 Application, 1 application , Each Nare, BID,  "Tae Charles MD, 1 Application at 02/08/24 2019    ondansetron (ZOFRAN) injection 4 mg, 4 mg, Intravenous, Q6H PRN, Tae Charles MD    Pharmacy to Dose meropenem (MERREM), , Does not apply, Continuous PRN, Tae Charles MD    potassium chloride (KAYCIEL) 20 mEq/15 mL solution 20 mEq, 20 mEq, Per PEG Tube, Once, Tae Charles MD    saccharomyces boulardii (FLORASTOR) capsule 250 mg, 250 mg, Per G Tube, Daily, Tae Charles MD    [COMPLETED] Insert Peripheral IV, , , Once **AND** sodium chloride 0.9 % flush 10 mL, 10 mL, Intravenous, PRN, Dea Lopez MD    sodium chloride 0.9 % flush 10 mL, 10 mL, Intravenous, Q12H, Tae Charles MD, 10 mL at 02/08/24 2020    sodium chloride 0.9 % flush 10 mL, 10 mL, Intravenous, PRN, Tae Charles MD    sodium chloride 0.9 % infusion 40 mL, 40 mL, Intravenous, PRN, Tae Charles MD    Allergies   Allergen Reactions    Coconut Unknown - High Severity    Levaquin [Levofloxacin] Other (See Comments)     unknown    Nuts Unknown - High Severity    Penicillins Rash     Patient's father noted patient had taken penicillin, many years ago, many times and then started developing a rash when she would take it.  She has received cefepime, ceftriaxone and cephalexin with no known adverse problems    Turkey Other (See Comments)     Causes migraines per pt reports     I have utilized all available immediate resources to obtain, update, or review the patient's current medications (including all prescriptions, over-the-counter products, herbals, cannabis/cannabidiol products, and vitamin/mineral/dietary (nutritional) supplements) for name, route of administration, type, dose and frequency.    A:    /60   Pulse 80   Temp 98.2 °F (36.8 °C) (Rectal)   Resp 14   Ht 152.4 cm (60\")   Wt 54.5 kg (120 lb 2.4 oz)   LMP  (LMP Unknown)   SpO2 100%   BMI 23.47 kg/m²     Vitals and nursing note reviewed.   Constitutional:       Appearance: Not in " distress. Ill-appearing and chronically ill-appearing.      Comments: Trach collar, tube feeds infusing  Eyes:      General: Lids are normal.   HENT:      Head: Normocephalic.   Neck:      Trachea: Tracheostomy present.   Pulmonary:      Effort: Pulmonary effort is normal.   Cardiovascular:      Normal rate present.   Edema:     Peripheral edema present.  Musculoskeletal:      Comments: Contractures upper and lower extremities   Skin:     General: Skin is warm.   Genitourinary:     Comments: Left-sided nephrostomy tube, Mojica catheter in place  Neurological:      Mental Status: Alert.      Comments: Nonverbal.  Eyes closed  Psychiatric:         Speech: Noncommunicative.         Cognition and Memory: Cognition is impaired.      Patient status: Disease state: Controlled with current treatments.  Current Functional status: Palliative Performance Scale Score: Performance 10% based on the following measures: Ambulation: Totally bed bound, Activity and Evidence of Disease: Unable to do any work, extensive evidence of disease, Self-Care: Total care required,  Intake: Mouth care only, LOC: Drowsy or comatose   Baseline Functional status: Palliative Performance Scale Score: Performance 10% based on the following measures: Ambulation: Totally bed bound, Activity and Evidence of Disease: Unable to do any work, extensive evidence of disease, Self-Care: Total care required,  Intake: Mouth care only, LOC: Drowsy or comatose   Nutritional status: Albumin 4.9 Body mass index is 23.47 kg/m².      Hospital Problem List      Severe sepsis    Complicated UTI (urinary tract infection)    Severe malnutrition    Functional quadriplegia    Dysphagia    Infection associated with nephrostomy catheter    History of anoxic brain injury    Hypernatremia     Impression/Problem List:     Severe sepsis  Complicated UTI with frequent UTIs  Pyeloureteritis, dysfunction of the indwelling stent is suspected per CT  Chronic respiratory failure with  hypoxia  History of anoxic brain injury  Functional quadriplegia  Hypernatremia  History of retroperitoneal hematoma and hematoma with abscess leading to necrotizing infection of the pelvis and bladder rupture s/p nephroureteral stent placement,  Tracheostomy present  Severe malnutrition  Hypertension  Left-sided nephrostomy tube in place  Chronic pulmonary embolism  Iron deficiency anemia  Migraine  Mitral valve prolapse  Dysphagia s/p PEG  Contractures  Debility            Recommendations/Plan:  1. plan: Goals of care include CODE STATUS no CPR/full support interventions.     Family support: The patient receives support from her .  Advance Directives: No advance directive on file     POA/Healthcare surrogate-spouse, Grant.     2.  Palliative care encounter  - Prognosis is poor long-term secondary to severe sepsis, complicated UTI with frequent UTIs, suspected dysfunction, with indwelling stent, respiratory failure, anoxic brain injury, quadriplegia, debility, and multiple comorbidities.  -Family appears to have poor prognostic awareness.      -Resident of [facility].       -Last seen by this provider during November hospitalization.  A MOST document was completed on 11/27 to include no CPR/limited support interventions, no intubation, no NIPPV.       -Urine and blood cultures pending.  Treated with IV antibiotics and IV fluids.   -Infectious disease, GI, and urology consulted.    -Evaluated by speech therapy and not appropriate for skilled intervention as she does not follow commands and not appropriate for swallow evaluation.     2/8-CODE STATUS clarified.  Will plan to complete a MOST document to include no CPR/full support interventions.    -Spouse wishes to pursue PEG tube exchange and wishes for patient to be transferred to tertiary facility for urologic stent exchange and prefers [place] Dr. Candido Aponte.    -family not prepared to de-escalate care measures at this juncture  and we will continue to provide family support as needed.    2/9-continue supportive measures  -plans to transfer to tertiary facility for ureteral stent exchange     Thank you for allowing us to participate in patient's plan of care. Palliative Care Team will continue to follow patient.     Roxane Sahni, APRN  2/9/2024  08:37 CST

## 2024-02-09 NOTE — CASE MANAGEMENT/SOCIAL WORK
Continued Stay Note  [facility]     Patient Name: Namita Zabala  MRN: [id number]  Today's Date: 2/9/2024    Admit Date: 2/7/2024    Plan: Acute care transfer DECLINED - [facility]   Discharge Plan       Row Name 02/09/24 1529       Plan    Plan Acute care transfer DECLINED - [facility]    Plan Comments Received return call from Vicky at [facility] [phone], advising Dr. Bond (sp?) urology had declined transfer.  Vicky advised Dr. Bond (sp?) contacted Dr. Charles directly to advise.      Row Name 02/09/24 1500       Plan    Plan Acute care transfer pending - [facility]    Plan Comments MARIO contacted [facility] for update [phone], was advised that Dr. Bond (sp?) at [facility], was reviewing and would call back.                   Discharge Codes    No documentation.                       CAMILA Gonzalez

## 2024-02-10 LAB
ANION GAP SERPL CALCULATED.3IONS-SCNC: 11 MMOL/L (ref 5–15)
BASOPHILS # BLD AUTO: 0.04 10*3/MM3 (ref 0–0.2)
BASOPHILS NFR BLD AUTO: 0.4 % (ref 0–1.5)
BUN SERPL-MCNC: 27 MG/DL (ref 6–20)
BUN/CREAT SERPL: 51.9 (ref 7–25)
CALCIUM SPEC-SCNC: 9.6 MG/DL (ref 8.6–10.5)
CHLORIDE SERPL-SCNC: 111 MMOL/L (ref 98–107)
CO2 SERPL-SCNC: 21 MMOL/L (ref 22–29)
CREAT SERPL-MCNC: 0.52 MG/DL (ref 0.57–1)
DEPRECATED RDW RBC AUTO: 48.5 FL (ref 37–54)
EGFRCR SERPLBLD CKD-EPI 2021: 116.2 ML/MIN/1.73
EOSINOPHIL # BLD AUTO: 0.19 10*3/MM3 (ref 0–0.4)
EOSINOPHIL NFR BLD AUTO: 1.7 % (ref 0.3–6.2)
ERYTHROCYTE [DISTWIDTH] IN BLOOD BY AUTOMATED COUNT: 14 % (ref 12.3–15.4)
GLUCOSE BLDC GLUCOMTR-MCNC: 102 MG/DL (ref 70–130)
GLUCOSE BLDC GLUCOMTR-MCNC: 115 MG/DL (ref 70–130)
GLUCOSE BLDC GLUCOMTR-MCNC: 118 MG/DL (ref 70–130)
GLUCOSE BLDC GLUCOMTR-MCNC: 86 MG/DL (ref 70–130)
GLUCOSE SERPL-MCNC: 131 MG/DL (ref 65–99)
HCT VFR BLD AUTO: 32.6 % (ref 34–46.6)
HGB BLD-MCNC: 10.8 G/DL (ref 12–15.9)
IMM GRANULOCYTES # BLD AUTO: 0.03 10*3/MM3 (ref 0–0.05)
IMM GRANULOCYTES NFR BLD AUTO: 0.3 % (ref 0–0.5)
LYMPHOCYTES # BLD AUTO: 1.24 10*3/MM3 (ref 0.7–3.1)
LYMPHOCYTES NFR BLD AUTO: 10.9 % (ref 19.6–45.3)
MCH RBC QN AUTO: 31.6 PG (ref 26.6–33)
MCHC RBC AUTO-ENTMCNC: 33.1 G/DL (ref 31.5–35.7)
MCV RBC AUTO: 95.3 FL (ref 79–97)
MONOCYTES # BLD AUTO: 0.5 10*3/MM3 (ref 0.1–0.9)
MONOCYTES NFR BLD AUTO: 4.4 % (ref 5–12)
NEUTROPHILS NFR BLD AUTO: 82.3 % (ref 42.7–76)
NEUTROPHILS NFR BLD AUTO: 9.4 10*3/MM3 (ref 1.7–7)
NRBC BLD AUTO-RTO: 0 /100 WBC (ref 0–0.2)
PLATELET # BLD AUTO: 168 10*3/MM3 (ref 140–450)
PMV BLD AUTO: 11.8 FL (ref 6–12)
POTASSIUM SERPL-SCNC: 4.5 MMOL/L (ref 3.5–5.2)
RBC # BLD AUTO: 3.42 10*6/MM3 (ref 3.77–5.28)
SODIUM SERPL-SCNC: 143 MMOL/L (ref 136–145)
WBC NRBC COR # BLD AUTO: 11.4 10*3/MM3 (ref 3.4–10.8)

## 2024-02-10 PROCEDURE — 25010000002 HEPARIN (PORCINE) PER 1000 UNITS: Performed by: FAMILY MEDICINE

## 2024-02-10 PROCEDURE — 99232 SBSQ HOSP IP/OBS MODERATE 35: CPT | Performed by: INTERNAL MEDICINE

## 2024-02-10 PROCEDURE — 82948 REAGENT STRIP/BLOOD GLUCOSE: CPT

## 2024-02-10 PROCEDURE — 94761 N-INVAS EAR/PLS OXIMETRY MLT: CPT

## 2024-02-10 PROCEDURE — 85025 COMPLETE CBC W/AUTO DIFF WBC: CPT | Performed by: FAMILY MEDICINE

## 2024-02-10 PROCEDURE — 94799 UNLISTED PULMONARY SVC/PX: CPT

## 2024-02-10 PROCEDURE — 25010000002 MEROPENEM PER 100 MG: Performed by: INTERNAL MEDICINE

## 2024-02-10 PROCEDURE — 25010000002 MORPHINE PER 10 MG: Performed by: FAMILY MEDICINE

## 2024-02-10 PROCEDURE — 80048 BASIC METABOLIC PNL TOTAL CA: CPT | Performed by: FAMILY MEDICINE

## 2024-02-10 PROCEDURE — 25810000003 DEXTROSE 5 % WITH KCL 20 MEQ 20 MEQ/L SOLUTION: Performed by: FAMILY MEDICINE

## 2024-02-10 PROCEDURE — 25010000002 MEROPENEM PER 100 MG: Performed by: FAMILY MEDICINE

## 2024-02-10 RX ORDER — METOPROLOL TARTRATE 50 MG/1
50 TABLET, FILM COATED ORAL EVERY 12 HOURS SCHEDULED
Status: DISCONTINUED | OUTPATIENT
Start: 2024-02-10 | End: 2024-02-19 | Stop reason: HOSPADM

## 2024-02-10 RX ORDER — METOPROLOL TARTRATE 1 MG/ML
5 INJECTION, SOLUTION INTRAVENOUS ONCE
Status: COMPLETED | OUTPATIENT
Start: 2024-02-10 | End: 2024-02-10

## 2024-02-10 RX ADMIN — MORPHINE SULFATE 1 MG: 2 INJECTION, SOLUTION INTRAMUSCULAR; INTRAVENOUS at 05:23

## 2024-02-10 RX ADMIN — MORPHINE SULFATE 1 MG: 2 INJECTION, SOLUTION INTRAMUSCULAR; INTRAVENOUS at 21:13

## 2024-02-10 RX ADMIN — METOPROLOL TARTRATE 50 MG: 50 TABLET, FILM COATED ORAL at 11:24

## 2024-02-10 RX ADMIN — MEROPENEM 1000 MG: 1 INJECTION, POWDER, FOR SOLUTION INTRAVENOUS at 14:53

## 2024-02-10 RX ADMIN — HEPARIN SODIUM 5000 UNITS: 5000 INJECTION INTRAVENOUS; SUBCUTANEOUS at 21:13

## 2024-02-10 RX ADMIN — FAMOTIDINE 20 MG: 10 INJECTION INTRAVENOUS at 11:23

## 2024-02-10 RX ADMIN — Medication 1 TABLET: at 11:24

## 2024-02-10 RX ADMIN — BACLOFEN 10 MG: 10 TABLET ORAL at 21:14

## 2024-02-10 RX ADMIN — FAMOTIDINE 20 MG: 10 INJECTION INTRAVENOUS at 21:13

## 2024-02-10 RX ADMIN — BACLOFEN 10 MG: 10 TABLET ORAL at 03:27

## 2024-02-10 RX ADMIN — POTASSIUM CHLORIDE AND DEXTROSE MONOHYDRATE 100 ML/HR: 150; 5 INJECTION, SOLUTION INTRAVENOUS at 14:52

## 2024-02-10 RX ADMIN — BACLOFEN 10 MG: 10 TABLET ORAL at 11:23

## 2024-02-10 RX ADMIN — POTASSIUM CHLORIDE AND DEXTROSE MONOHYDRATE 100 ML/HR: 150; 5 INJECTION, SOLUTION INTRAVENOUS at 01:33

## 2024-02-10 RX ADMIN — MEROPENEM 1000 MG: 1 INJECTION, POWDER, FOR SOLUTION INTRAVENOUS at 21:14

## 2024-02-10 RX ADMIN — Medication 10 ML: at 11:23

## 2024-02-10 RX ADMIN — MEROPENEM 1000 MG: 1 INJECTION, POWDER, FOR SOLUTION INTRAVENOUS at 05:24

## 2024-02-10 RX ADMIN — METOPROLOL TARTRATE 5 MG: 5 INJECTION INTRAVENOUS at 06:03

## 2024-02-10 RX ADMIN — HEPARIN SODIUM 5000 UNITS: 5000 INJECTION INTRAVENOUS; SUBCUTANEOUS at 11:23

## 2024-02-10 RX ADMIN — BACLOFEN 10 MG: 10 TABLET ORAL at 18:19

## 2024-02-10 RX ADMIN — Medication 250 MG: at 11:23

## 2024-02-10 NOTE — PROGRESS NOTES
RT EQUIPMENT DEVICE RELATED - SKIN ASSESSMENT    Pritesh Score:  Pritesh Score: 11     RT Medical Equipment/Device:     Tracheostomy - Are sutures present:  No    Skin Assessment:      Neck:  Incision and Intact    Device Skin Pressure Protection:  Skin-to-device areas padded:  Trach Tie    Nurse Notification:  No    Myah Carter, RRT

## 2024-02-10 NOTE — PROGRESS NOTES
INFECTIOUS DISEASES PROGRESS NOTE    Patient:  Namita Zabala  YOB: 1975  MRN: [id number]   Admit date: 2024   Admitting Physician: Tae Charles MD  Primary Care Physician: David Lara MD    Chief Complaint: non verbal      Interval History: Patient moved out of ICU to .  No issues reported from staff      Allergies:   Allergies   Allergen Reactions    Coconut Unknown - High Severity    Levaquin [Levofloxacin] Other (See Comments)     unknown    Nuts Unknown - High Severity    Penicillins Rash     Patient's father noted patient had taken penicillin, many years ago, many times and then started developing a rash when she would take it.  She has received cefepime, ceftriaxone and cephalexin with no known adverse problems    Turkey Other (See Comments)     Causes migraines per pt reports       Current Scheduled Medications:   baclofen, 10 mg, Per G Tube, Q6H  famotidine, 20 mg, Intravenous, Q12H  heparin (porcine), 5,000 Units, Subcutaneous, Q12H  meropenem, 1,000 mg, Intravenous, Q8H  metoprolol tartrate, 50 mg, Per PEG Tube, Q12H  multivitamin with minerals, 1 tablet, Oral, Daily  saccharomyces boulardii, 250 mg, Per G Tube, Daily  sodium chloride, 10 mL, Intravenous, Q12H      Current PRN Medications:    acetaminophen    albuterol    artificial tears    senna-docusate sodium **AND** polyethylene glycol **AND** bisacodyl **AND** bisacodyl    hydrOXYzine    Morphine    ondansetron    Pharmacy to Dose meropenem (MERREM)    [COMPLETED] Insert Peripheral IV **AND** sodium chloride    sodium chloride    sodium chloride    dextrose 5 % with KCl 20 mEq, 100 mL/hr, Last Rate: 100 mL/hr (02/10/24 0133)  Pharmacy to Dose meropenem (MERREM),            Objective     Vital Signs:  Temp (24hrs), Av.2 °F (36.8 °C), Min:97.7 °F (36.5 °C), Max:98.6 °F (37 °C)      BP 91/48 (BP Location: Right leg, Patient Position: Lying)   Pulse 97   Temp 98.6 °F (37 °C) (Axillary)   Resp 24   Ht  "152.4 cm (60\")   Wt 55.1 kg (121 lb 7.6 oz)   LMP  (LMP Unknown)   SpO2 98%   BMI 23.72 kg/m²         Physical Exam:  General: Patient's chronically ill contracted female lying in bed.  Patient care techs at bedside with patient  Trach in place without any obvious significant secretions noted.  Abdomen: PEG in place  : Mojica in place.  Patient also has nephrostomy tube.    Results Review:    I reviewed the patient's new clinical results.    Lab Results:    CBC:   Lab Results   Lab 02/07/24  1416 02/08/24 0342 02/09/24 0316   WBC 15.14* 10.47 7.59   HEMOGLOBIN 13.3 12.9 10.3*   HEMATOCRIT 42.6 47.9* 34.5   PLATELETS 312 210 158        AutoDiff:   Lab Results   Lab 02/07/24  1416 02/08/24 0342 02/09/24 0316   NEUTROPHIL % 86.1* 69.7 70.3   LYMPHOCYTE % 7.5* 19.1* 21.2   MONOCYTES % 5.6 10.2 6.3   EOSINOPHIL % 0.0* 0.0* 1.1   BASOPHIL % 0.5 0.5 0.7   NEUTROS ABS 13.04* 7.30* 5.34   LYMPHS ABS 1.14 2.00 1.61   MONOS ABS 0.85 1.07* 0.48   EOS ABS 0.00 0.00 0.08   BASOS ABS 0.07 0.05 0.05        Manual Diff:    Lab Results   Lab 02/07/24  1416 02/08/24 0342 02/09/24  0316   NEUTROS ABS 13.04* 7.30* 5.34           CMP:   Lab Results   Lab 02/07/24  1411 02/07/24  1743 02/08/24  1423 02/09/24  0317 02/09/24  1312   SODIUM 161*   < > 156* 152* 148*   POTASSIUM 4.0   < > 3.7 3.5 4.0   CHLORIDE 115*   < > 119* 118* 115*   CO2 27.0   < > 27.0 26.0 26.0   BUN 56*   < > 49* 39* 34*   CREATININE 0.97   < > 0.69 0.54* 0.46*   CALCIUM 11.4*   < > 9.8 9.8 9.7   BILIRUBIN 0.9  --   --   --   --    ALK PHOS 62  --   --   --   --    ALT (SGPT) 13  --   --   --   --    AST (SGOT) 20  --   --   --   --    GLUCOSE 161*   < > 163* 172* 122*    < > = values in this interval not displayed.       Estimated Creatinine Clearance: 118.9 mL/min (A) (by C-G formula based on SCr of 0.46 mg/dL (L)).    Culture Results:    Microbiology Results (last 10 days)       Procedure Component Value - Date/Time    Urine Culture - Urine, Urine, " Catheter [[id number]] Collected: 02/08/24 0910    Lab Status: Final result Specimen: Urine, Catheter Updated: 02/09/24 0925     Urine Culture >100,000 CFU/mL Normal Urogenital Brigida    Narrative:      Colonization of the urinary tract without infection is common. Treatment is discouraged unless the patient is symptomatic, pregnant, or undergoing an invasive urologic procedure.    Miscellaneous Micro Request - Specimen Not Sent, Specimen Not Sent [[id number]] Resulted: 02/08/24 0731    Lab Status: Final result Specimen: Specimen Not Sent Updated: 02/08/24 0731     Extra Tube --    Blood Culture - Blood, Hand, Digit Right [[id number]]  (Abnormal) Collected: 02/07/24 1420    Lab Status: Final result Specimen: Blood from Hand, Digit Right Updated: 02/09/24 0513     Blood Culture Staphylococcus, coagulase negative     Isolated from Pediatric Bottle     Gram Stain Pediatric Bottle Gram positive cocci in clusters    Narrative:      Probable contaminant requires clinical correlation, susceptibility not performed unless requested by physician.      Blood Culture ID, PCR - Blood, Hand, Digit Right [[id number]]  (Abnormal) Collected: 02/07/24 1420    Lab Status: Final result Specimen: Blood from Hand, Digit Right Updated: 02/08/24 1023     BCID, PCR Staph spp, not aureus or lugdunensis. Identification by BCID2 PCR.     BOTTLE TYPE Pediatric Bottle    Blood Culture - Blood, Foot, Right [[id number]]  (Normal) Collected: 02/07/24 1228    Lab Status: Preliminary result Specimen: Blood from Foot, Right Updated: 02/09/24 1300     Blood Culture No growth at 2 days    Urine Culture - Urine, Urine, Catheter [[id number]] Collected: 02/07/24 1218    Lab Status: Final result Specimen: Urine, Catheter Updated: 02/09/24 0846     Urine Culture >100,000 CFU/mL Mixed Brigida Isolated    Narrative:      Specimen contains mixed organisms of questionable pathogenicity suggestive of contamination. If symptoms persist, suggest recollection.  Colonization  of the urinary tract without infection is common. Treatment is discouraged unless the patient is symptomatic, pregnant, or undergoing an invasive urologic procedure.    COVID-19, FLU A/B, RSV PCR 1 HR TAT - Swab, Nasopharynx [[id number]]  (Normal) Collected: 02/07/24 1215    Lab Status: Final result Specimen: Swab from Nasopharynx Updated: 02/07/24 1340     COVID19 Not Detected     Influenza A PCR Not Detected     Influenza B PCR Not Detected     RSV, PCR Not Detected    Narrative:      Fact sheet for providers: https://www.fda.gov/media/545209/download    Fact sheet for patients: https://www.fda.gov/media/404638/download    Test performed by PCR.             01/29/2024 0413 02/08/2024 0731 Urine Culture - Urine, Indwelling Urethral Catheter [[id number]]     (Abnormal)   Urine from Indwelling Urethral Catheter    Edited Result - FINAL Component Value   Urine Culture >100,000 CFU/mL Escherichia coli ESBL Abnormal       Consider infectious disease consult.  Susceptibility results may not correlate to clinical outcomes.    >100,000 CFU/mL Proteus mirabilis Abnormal     >100,000 CFU/mL Pseudomonas aeruginosa Abnormal        Susceptibility     Escherichia coli ESBL Proteus mirabilis Pseudomonas aeruginosa     SANDEEP SANDEEP SANDEEP     Amikacin 4 Susceptible         Amoxicillin + Clavulanate   <=2 Susceptible       Ampicillin   <=2 Susceptible       Ampicillin + Sulbactam   <=2 Susceptible       Cefepime   <=1 Susceptible 8 Susceptible     Ceftazidime   <=1 Susceptible 4 Susceptible     Ceftriaxone   <=1 Susceptible       Ciprofloxacin     1 Intermediate     Ertapenem <=0.5 Susceptible <=0.5 Susceptible (C) 1       Gentamicin >=16 Resistant <=1 Susceptible       Imipenem   2 Intermediate (C) 1 <=0.25 Susceptible (C) 1     Levofloxacin >=8 Resistant 4 Resistant 4 Resistant     Meropenem <=0.25 Susceptible <=0.25 Susceptible (C) 1 <=0.25 Susceptible (C) 1     Nitrofurantoin <=16 Susceptible 128 Resistant       Piperacillin + Tazobactam  <=4 Susceptible <=4 Susceptible 8 Susceptible     Tobramycin >=16 Resistant   <=1 Susceptible     Trimethoprim + Sulfamethoxazole >=320 Resistant <=20 Susceptible                    1 Appended report. These results have been appended to a previously final verified report.             Radiology:       Imaging Results (Last 72 Hours)       Procedure Component Value Units Date/Time    XR Abdomen KUB [[id number]] Collected: 02/08/24 1322     Updated: 02/08/24 1327    Narrative:      EXAM: XR ABDOMEN KUB-      DATE: 2/8/2024 12:22 PM     HISTORY: Document correct placement of PEG tube replacement;      COMPARISON: 11/27/2023.     TECHNIQUE:   Frontal view of the abdomen. 1 image.     FINDINGS:    Contrast was injected into the patient's G-tube. Contrast opacifies the  stomach. There is no abnormal leakage of contrast. Bowel gas pattern  visualized is nonspecific with borderline air-filled loops of small  bowel and probable bowel gas distally in the colon. This may represent  ileus. Left ureteral stent is partially imaged.          Impression:         1. Contrast in the stomach with no abnormal leakage.  2. Probable ileus.     This report was signed and finalized on 2/8/2024 1:24 PM by Dominguez Barton.       CT Abdomen Pelvis Without Contrast [[id number]] Collected: 02/07/24 1720     Updated: 02/07/24 1733    Narrative:      CT ABDOMEN PELVIS WO CONTRAST- 2/7/2024 3:49 PM     HISTORY: Sepsis; R50.9-Fever, unspecified; N39.0-Urinary tract  infection, site not specified; A41.9-Sepsis, unspecified organism;  E87.0-Hyperosmolality and hypernatremia      COMPARISON: 1/29/2024     DLP: 784.68 mGy.cm . All CT scans are performed using dose optimization  techniques as appropriate to the performed exam and including at least  one of the following: Automated exposure control, adjustment of the mA  and/or kV according to size, and the use of the iterative reconstruction  technique.     TECHNIQUE: Noncontrast enhanced images of the  abdomen and pelvis  obtained without oral contrast. The study is degraded by motion  artifact.     FINDINGS:  Bibasilar atelectasis is present. The base of the heart is unremarkable.  There is no pericardial effusion..     LIVER: No focal liver lesion.     BILIARY SYSTEM: The gallbladder is unremarkable. No intrahepatic or  extrahepatic ductal dilatation.     PANCREAS: No focal pancreatic lesion.     SPLEEN: Unremarkable.     KIDNEYS AND ADRENALS: The adrenals are unremarkable. Bilateral  nonobstructing nephrolithiasis is present. A left-sided nephrostomy tube  appears to be well positioned within the renal pelvis. A left-sided  ureteral stent is also in place. There is progressive distention of the  upper tracts of the left kidney and proximal left ureter in comparison  to the previous exam. There is again evidence of pyeloureteritis with  periureteral stranding and thickening of the urothelium.. The right  ureter is decompressed and normal in appearance with no evidence of  ureteral calculus..     RETROPERITONEUM: No mass, lymphadenopathy or hemorrhage.     GI TRACT: A percutaneous gastrostomy feeding tube is in place. It is  well-positioned. The stomach is moderately distended and air-filled. No  evidence of gastric wall thickening or definite gastric outlet  obstruction. There is a large volume of stool throughout the colon which  could be contributing to stasis. A left paramedian ventral wall hernia  is noted at the umbilicus. There is slight protrusion of a portion of  the transverse colon into this defect without evidence of incarceration  or obstruction. There is increased stool within the colon to just above  the level of the rectum. No fecal impaction. No significant small bowel  distention.. The appendix is not clearly identified..     OTHER: There is no mesenteric mass, lymphadenopathy or fluid collection.  The osseous structures and soft tissues demonstrate no worrisome  lesions. No free fluid is  present.     PELVIS: A Mojica catheter is in place within the bladder with the urinary  bladder decompressed.. The uterus and adnexa are unremarkable. No free  fluid in the cul-de-sac..       Impression:      1. A left-sided nephrostomy tube and ureteral stent remains in place and  well-positioned. There is mild progressive distention of the upper  tracts of the left kidney as well as of the proximal and mid left ureter  when compared to the previous exam. There is associated pyeloureteritis  with urothelial thickening and stranding surrounding the left renal  pelvis and proximal ureter. Dysfunction of the indwelling stent is  suspected. The right kidney demonstrates nonobstructing nephrolithiasis.  No right-sided ureteral stone is present.  2. Large volume of stool within the colon. No fecal impaction within the  rectal vault. The stomach is moderately distended with air and fluid  with an air-fluid level but without definite gastric wall thickening or  convincing evidence of gastric outlet obstruction. No evidence of  mechanical obstruction.  3. Just to the left of midline at the umbilicus there is a small  abdominal wall defect. Slight protrusion of a portion of the transverse  colon into this defect is present without incarceration or obstruction.  4. The uterus and adnexa are unremarkable..           This report was signed and finalized on 2/7/2024 5:30 PM by Dr. Yuniel De Jesus MD.       XR Chest 1 View [[id number]] Collected: 02/07/24 1244     Updated: 02/07/24 1250    Narrative:      EXAMINATION: XR CHEST 1 VW-  2/7/2024 12:44 PM 1 view     HISTORY: fever     COMPARISON: 1/29/2024     TECHNIQUE: A single frontal view of the chest was obtained.     FINDINGS:  This examination is limited by positioning of the patient's head over  the RIGHT upper lung/chest. This significantly limits evaluation of the  upper half of the RIGHT lung. The remainder of the lungs are clear. The  heart is not enlarged. A tracheostomy  tube is in place. Some gaseous  distention of loops of bowel in the upper abdomen       Impression:         1.  Exam limited by patient positioning as the patient's head obscures  much of the RIGHT hemithorax. The remaining visualized portion of the  lungs are clear.          2.  There are gaseous distended loops of bowel in the upper abdomen.           This report was signed and finalized on 2/7/2024 12:47 PM by Dr. Taj Bergeron MD.                   Active Hospital Problems    Diagnosis     **Severe sepsis     History of anoxic brain injury     Hypernatremia     Infection associated with nephrostomy catheter     Functional quadriplegia     Dysphagia     Severe malnutrition     Complicated UTI (urinary tract infection)        IMPRESSION:  Sepsis secondary to complicated urinary tract infection.  Patient's heart rate has improved.  Elevated temperature has improved.  Blood pressure trended down some.  Mean remains at or above 65.  Patient has tube feeding started back at 25 mL an hour with a goal of 60 or 65 per report.  Patient was without tube feedings at the nursing home for a few days due to malfunctioning tube.  Urine culture from January 29, 2024 positive for ESBL E. coli, Proteus mirabilis and Pseudomonas aeruginosa.  It appears patient had been on cephalexin based on nursing home MAR.  Urine culture obtained February 7 from catheter from prior to admission revealed greater than 100,000 mixed oscar.  Patient had catheter exchanged, new specimen sent and mixed urogenital oscar was also isolated  Left ureteral stent overdue for exchange.  Nonobstructing right renal stones  Anoxic brain injury  Presence of trach  Hypernatremia-continues downward trend  Penicillin allergy-updated information received from patient's father that her penicillin reaction was that of a rash many years ago.  Review of records reveal she has received cefepime, ceftriaxone and cephalexin here at [facility].      RECOMMENDATION:    Continue meropenem through today given history of ESBL E. coli and Pseudomonas (ertapenem does not cover Pseudomonas)-and Proteus mirabilis from urine culture January 29.  To follow-up urine specimens and 1 before catheter change in 1 after catheter change only positive for normal urogenital oscar.  Will monitor closely off antibiotic therapy  If patient has recurrent fever, will need to reevaluate but may need specimen from nephrostomy tube.  Await transfer for definitive management of left nephroureteral stent.  If patient remained stable off meropenem here, expect urologist to have patient receive perioperative antibiotic therapy at the time of the stent exchange.      Discussed with Dr. Charles yesterday.  He had been contacted by [facility] and initially they said patient did not need to be transferred.  He later received a call that patient's urologist, Dr. Rey felt like the left sided nephroureteral stent needed to be addressed and it was recommended that attempt to transfer to  be made or reinitiate attempt transfer to [facility] on Monday.        Rachael Foy MD  02/10/24  10:06 CST

## 2024-02-10 NOTE — PLAN OF CARE
Goal Outcome Evaluation:           Progress: no change  Outcome Evaluation: Afebrile this shift, improved heart rate with beta blockers resumed. Sinus at 82 currently on tele. IVF rate decreased. Continue current tube feeding rate, minimal residual.

## 2024-02-10 NOTE — PLAN OF CARE
Problem: Skin Injury Risk Increased  Goal: Skin Health and Integrity  Intervention: Optimize Skin Protection  Recent Flowsheet Documentation  Taken 2/9/2024 1800 by Rudi Wells RN  Head of Bed ([facility]) Positioning: HOB at 20-30 degrees  Taken 2/9/2024 1600 by Rudi Wells RN  Head of Bed ([facility]OB) Positioning: HOB at 20-30 degrees  Taken 2/9/2024 1400 by Rudi Wells RN  Head of Bed ([facility]) Positioning: HOB at 20-30 degrees  Taken 2/9/2024 1200 by Rudi Wells RN  Pressure Reduction Techniques:   weight shift assistance provided   frequent weight shift encouraged  Head of Bed (HOB) Positioning: [facility] elevated  Pressure Reduction Devices: pressure-redistributing mattress utilized  Skin Protection:   skin-to-skin areas padded   skin-to-device areas padded   adhesive use limited  Taken 2/9/2024 1000 by Rudi Wells RN  Head of Bed ([facility]) Positioning: HOB elevated  Taken 2/9/2024 0800 by Rudi Wells RN  Pressure Reduction Techniques:   weight shift assistance provided   frequent weight shift encouraged  Head of Bed ([facility]) Positioning: [facility] elevated  Pressure Reduction Devices: pressure-redistributing mattress utilized  Skin Protection:   skin-to-skin areas padded   skin-to-device areas padded   adhesive use limited     Problem: Glycemic Control Impaired (Sepsis/Septic Shock)  Goal: Blood Glucose Level Within Desired Range  Intervention: Optimize Glycemic Control  Recent Flowsheet Documentation  Taken 2/9/2024 1200 by Rudi Wells RN  Glycemic Management: blood glucose monitored  Taken 2/9/2024 0800 by Rudi Wells RN  Glycemic Management: blood glucose monitored   Goal Outcome Evaluation: Pt safety maintained throughout shift, vital sign stable for pt. Pt with 2 large BM; TF increased to 35ml/h with goal of 45ml/hr. Pt turned q2h with assist of 2 persons due to pt's severe contractures.

## 2024-02-10 NOTE — PROGRESS NOTES
[facility]  INPATIENT PROGRESS NOTE    Patient Name: Namita Zabala  Date of Admission: 2/7/2024  Today's Date: 02/10/24  Length of Stay: 3  Primary Care Physician: David Lara MD    Subjective   Chief Complaint: Fevers  Fever        48-year-old female with history of anoxic brain injury, spastic quadriplegia, percutaneous endoscopic gastrostomy tube placement, severe malnutrition, frequent urinary tract infections with secondary sepsis, was admitted to the hospital December 2023 and treated with broad-spectrum antibiotics; history of mitral valve prolapse, nephrolithiasis, with a left percutaneous nephroureteral drain in place, last exchanged documented on January 2023.      Patient came from [facility]; as per family member patient has had a malfunctioning percutaneous gastrostomy tube for several days, and approximately 3 days ago developed fevers.  There is no other information available at this time.  Not able to provide any history due to neurological condition.    She was admitted to intensive care unit.    Discussed with family member yesterday afternoon.      Tracheostomy  Transferred to medical floor  No fevers reported  Labs pending today          Review of Systems   Constitutional:  Positive for fever.      All pertinent negatives and positives are as above. All other systems have been reviewed and are negative unless otherwise stated.     Objective    Temp:  [97.7 °F (36.5 °C)-98.6 °F (37 °C)] 98.6 °F (37 °C)  Heart Rate:  [] 97  Resp:  [14-24] 24  BP: ()/(46-94) 91/48  Physical Exam  Constitutional:       Appearance: Chronically ill-appearing.  Multiple contractures.  HENT:      Head: Normocephalic and atraumatic.      Nose: Nose normal.      Mouth/Throat:      Mouth: Mucous membranes are dry.     Pharynx: Able to evaluate  Eyes:      Extraocular Movements: Extraocular movements preserved      Conjunctiva/sclera: Conjunctivae normal.      Pupils: Pupils are equal, round, and reactive to light.   Cardiovascular:      Rate and Rhythm: Tachycardic, normal rate and regular rhythm.      Pulses: Fast pulse  Pulmonary:      Effort: No tachypnea.     Breath sounds: Reduced breath sounds on the right due to positioning.  Abdominal:      General: Abdominal scar extensive, with no open wounds there is a percutaneous gastrostomy tube in place.  Abdomen is nondistended.  Bowel sounds are diminished.     Palpations: Abdomen is soft.      Tenderness: There is no guarding or rebound.   Musculoskeletal:         General: Multiple upper and lower extremity flexion contractures; decreased range of motion.    Extremities: Multiple contractures as per musculoskeletal exam; no lower extremity edema.  Skin:     Capillary Refill: Capillary refill takes less than 2 seconds.      Coloration: Skin is not jaundiced.      Findings: No rash.   Neurological:      General: Spastic quadriplegia.  I am not able to assess patient's orientation or memory.  Speech:  with aphasia.   Psychiatric evaluation not able to be performed.      Results Review:  I have reviewed the labs, radiology results, and diagnostic studies.    Laboratory Data:   Results from last 7 days   Lab Units 02/09/24  0316 02/08/24  0342 02/07/24  1416   WBC 10*3/mm3 7.59 10.47 15.14*   HEMOGLOBIN g/dL 10.3* 12.9 13.3   HEMATOCRIT % 34.5 47.9* 42.6   PLATELETS 10*3/mm3 158 210 312        Results from last 7 days   Lab Units 02/09/24  1312 02/09/24  0317 02/08/24  1423 02/07/24  1743 02/07/24  1411   SODIUM mmol/L 148* 152* 156*   < > 161*   POTASSIUM mmol/L 4.0 3.5 3.7   < > 4.0   CHLORIDE mmol/L 115* 118* 119*   < > 115*   CO2 mmol/L 26.0 26.0 27.0   < > 27.0   BUN mg/dL 34* 39* 49*   < > 56*   CREATININE mg/dL 0.46* 0.54* 0.69   < > 0.97   CALCIUM mg/dL 9.7 9.8 9.8   < > 11.4*   BILIRUBIN mg/dL  --   --   --   --  0.9   ALK PHOS U/L  --   --   --   --  62   ALT (SGPT) U/L   --   --   --   --  13   AST (SGOT) U/L  --   --   --   --  20   GLUCOSE mg/dL 122* 172* 163*   < > 161*    < > = values in this interval not displayed.       Culture Data:   Blood Culture   Date Value Ref Range Status   02/07/2024 Abnormal Stain (C)  Preliminary       Radiology Data:   Imaging Results (Last 24 Hours)       ** No results found for the last 24 hours. **            I have reviewed the patient's current medications.     Assessment/Plan   Assessment  Active Hospital Problems    Diagnosis     **Severe sepsis     History of anoxic brain injury     Hypernatremia     Infection associated with nephrostomy catheter     Functional quadriplegia     Dysphagia     Severe malnutrition     Complicated UTI (urinary tract infection)      Severe sepsis, resolved  Recurrent urinary tract infection, complicated, associated to nephrostomy tube/indwelling catheter/ureteral stent  Status post replacement of 18 Fr percutaneous endoscopic gastrostomy tube 2/08/24  Hypernatremia, resolved  Mild hypokalemia, resolved  History of anoxic brain injury 2022  Chronic left nephrostomy tube in place  Spastic quadriplegia  History of pulmonary embolism in 2021  History of COVID-19 in 2021        Sodium level improved from 162 to 143    Potassium 4.5.  BUN 34    Creatinine normal        CT scan report  1. A left-sided nephrostomy tube and ureteral stent remains in place and  well-positioned. There is mild progressive distention of the upper  tracts of the left kidney as well as of the proximal and mid left ureter  when compared to the previous exam. There is associated pyeloureteritis  with urothelial thickening and stranding surrounding the left renal  pelvis and proximal ureter. Dysfunction of the indwelling stent is  suspected. The right kidney demonstrates nonobstructing nephrolithiasis.  No right-sided ureteral stone is present.  2. Large volume of stool within the colon. No fecal impaction within the  rectal vault. The stomach is  moderately distended with air and fluid  with an air-fluid level but without definite gastric wall thickening or  convincing evidence of gastric outlet obstruction. No evidence of  mechanical obstruction.  3. Just to the left of midline at the umbilicus there is a small  abdominal wall defect. Slight protrusion of a portion of the transverse  colon into this defect is present without incarceration or obstruction.  4. The uterus and adnexa are unremarkable..     -No fevers in 48 hours  -Urine culture 1/29/24: E. coli ESBL sensitive to ertapenem and meropenem  Proteus mirabilis resistant to quinolones and nitrofurantoin  Pseudomonas resistant to Levaquin.  -Blood culture 2/7/24: staph coag negative x 1, probable contaminant  -Urine culture 2/7/24: mixed oscar      Treatment Plan  Continue meropenem IV, started January 7, 2024  Continue IV fluids D5 + KCl, decrease to 75 MLS per hour.  Labs pending.   S/P percutaneous gastrostomy tube exchange.  Continue enteral feedings.  Follow nutritional recommendations.   Follow electrolytes  Mojica catheter exchanged  2/8/2024.      Urology consult noted.  Patient will need transfer for left nephroureteral stent exchange.      Initially transfer was denied; I was called back from the transfer center yesterday afternoon.  Dr. Bond contacted Dr. Rey, recommending exchange of this stent soon.  We will restart transfer to [facility] on Monday.       Heparin subcutaneous for DVT prophylaxis.  Prognosis is poor given functional status.      Discussed at length with  yesterday.    Medical Decision Making  Number and Complexity of problems: 10, high complexity  Differential Diagnosis: See above    Conditions and Status        Condition is improving.     MDM Data  External documents reviewed: None  Cardiac tracing (EKG, telemetry) interpretation: Reviewed, sinus tachycardia  Radiology interpretation: Radiology reports reviewed  Labs reviewed: Yes  Any tests that were considered  but not ordered: No     Decision rules/scores evaluated (example [id number], Wells, etc): None     Discussed with: Nurse staff and specialists     Care Planning  Shared decision making: With family  Code status and discussions: No chest compressions.    Disposition  Social Determinants of Health that impact treatment or disposition: Nursing home.  Bedbound.  I expect the patient to be transferred for nephroureteral stent exchange.    Electronically signed by Tae Charles MD, 02/10/24, 09:46 CST.

## 2024-02-11 LAB
ANION GAP SERPL CALCULATED.3IONS-SCNC: 12 MMOL/L (ref 5–15)
BASOPHILS # BLD AUTO: 0.02 10*3/MM3 (ref 0–0.2)
BASOPHILS NFR BLD AUTO: 0.3 % (ref 0–1.5)
BUN SERPL-MCNC: 16 MG/DL (ref 6–20)
BUN/CREAT SERPL: 35.6 (ref 7–25)
CALCIUM SPEC-SCNC: 10.1 MG/DL (ref 8.6–10.5)
CHLORIDE SERPL-SCNC: 107 MMOL/L (ref 98–107)
CO2 SERPL-SCNC: 22 MMOL/L (ref 22–29)
CREAT SERPL-MCNC: 0.45 MG/DL (ref 0.57–1)
DEPRECATED RDW RBC AUTO: 51.2 FL (ref 37–54)
EGFRCR SERPLBLD CKD-EPI 2021: 120.3 ML/MIN/1.73
EOSINOPHIL # BLD AUTO: 0.39 10*3/MM3 (ref 0–0.4)
EOSINOPHIL NFR BLD AUTO: 4.9 % (ref 0.3–6.2)
ERYTHROCYTE [DISTWIDTH] IN BLOOD BY AUTOMATED COUNT: 14.4 % (ref 12.3–15.4)
GLUCOSE BLDC GLUCOMTR-MCNC: 109 MG/DL (ref 70–130)
GLUCOSE BLDC GLUCOMTR-MCNC: 135 MG/DL (ref 70–130)
GLUCOSE BLDC GLUCOMTR-MCNC: 138 MG/DL (ref 70–130)
GLUCOSE SERPL-MCNC: 128 MG/DL (ref 65–99)
HCT VFR BLD AUTO: 41.8 % (ref 34–46.6)
HGB BLD-MCNC: 13.4 G/DL (ref 12–15.9)
IMM GRANULOCYTES # BLD AUTO: 0.04 10*3/MM3 (ref 0–0.05)
IMM GRANULOCYTES NFR BLD AUTO: 0.5 % (ref 0–0.5)
LYMPHOCYTES # BLD AUTO: 1.69 10*3/MM3 (ref 0.7–3.1)
LYMPHOCYTES NFR BLD AUTO: 21.2 % (ref 19.6–45.3)
MCH RBC QN AUTO: 31.5 PG (ref 26.6–33)
MCHC RBC AUTO-ENTMCNC: 32.1 G/DL (ref 31.5–35.7)
MCV RBC AUTO: 98.1 FL (ref 79–97)
MONOCYTES # BLD AUTO: 0.46 10*3/MM3 (ref 0.1–0.9)
MONOCYTES NFR BLD AUTO: 5.8 % (ref 5–12)
NEUTROPHILS NFR BLD AUTO: 5.39 10*3/MM3 (ref 1.7–7)
NEUTROPHILS NFR BLD AUTO: 67.3 % (ref 42.7–76)
NRBC BLD AUTO-RTO: 0 /100 WBC (ref 0–0.2)
PLATELET # BLD AUTO: 178 10*3/MM3 (ref 140–450)
PMV BLD AUTO: 12.7 FL (ref 6–12)
POTASSIUM SERPL-SCNC: 5.2 MMOL/L (ref 3.5–5.2)
RBC # BLD AUTO: 4.26 10*6/MM3 (ref 3.77–5.28)
SODIUM SERPL-SCNC: 141 MMOL/L (ref 136–145)
WBC NRBC COR # BLD AUTO: 7.99 10*3/MM3 (ref 3.4–10.8)

## 2024-02-11 PROCEDURE — 25010000002 HEPARIN (PORCINE) PER 1000 UNITS: Performed by: FAMILY MEDICINE

## 2024-02-11 PROCEDURE — 80048 BASIC METABOLIC PNL TOTAL CA: CPT | Performed by: FAMILY MEDICINE

## 2024-02-11 PROCEDURE — 82948 REAGENT STRIP/BLOOD GLUCOSE: CPT

## 2024-02-11 PROCEDURE — 94799 UNLISTED PULMONARY SVC/PX: CPT

## 2024-02-11 PROCEDURE — 85025 COMPLETE CBC W/AUTO DIFF WBC: CPT | Performed by: FAMILY MEDICINE

## 2024-02-11 PROCEDURE — 94761 N-INVAS EAR/PLS OXIMETRY MLT: CPT

## 2024-02-11 PROCEDURE — 25010000002 MEROPENEM PER 100 MG: Performed by: INTERNAL MEDICINE

## 2024-02-11 PROCEDURE — 25010000002 MEROPENEM PER 100 MG: Performed by: FAMILY MEDICINE

## 2024-02-11 PROCEDURE — 99232 SBSQ HOSP IP/OBS MODERATE 35: CPT | Performed by: INTERNAL MEDICINE

## 2024-02-11 PROCEDURE — 25010000002 MORPHINE PER 10 MG: Performed by: FAMILY MEDICINE

## 2024-02-11 PROCEDURE — 25810000003 DEXTROSE 5 % WITH KCL 20 MEQ 20 MEQ/L SOLUTION: Performed by: FAMILY MEDICINE

## 2024-02-11 RX ADMIN — MORPHINE SULFATE 1 MG: 2 INJECTION, SOLUTION INTRAMUSCULAR; INTRAVENOUS at 02:34

## 2024-02-11 RX ADMIN — HEPARIN SODIUM 5000 UNITS: 5000 INJECTION INTRAVENOUS; SUBCUTANEOUS at 20:47

## 2024-02-11 RX ADMIN — MEROPENEM 1000 MG: 1 INJECTION, POWDER, FOR SOLUTION INTRAVENOUS at 20:47

## 2024-02-11 RX ADMIN — MEROPENEM 1000 MG: 1 INJECTION, POWDER, FOR SOLUTION INTRAVENOUS at 06:04

## 2024-02-11 RX ADMIN — FAMOTIDINE 20 MG: 10 INJECTION INTRAVENOUS at 09:35

## 2024-02-11 RX ADMIN — MORPHINE SULFATE 1 MG: 2 INJECTION, SOLUTION INTRAMUSCULAR; INTRAVENOUS at 19:32

## 2024-02-11 RX ADMIN — BACLOFEN 10 MG: 10 TABLET ORAL at 02:35

## 2024-02-11 RX ADMIN — BACLOFEN 10 MG: 10 TABLET ORAL at 20:47

## 2024-02-11 RX ADMIN — POTASSIUM CHLORIDE AND DEXTROSE MONOHYDRATE 50 ML/HR: 150; 5 INJECTION, SOLUTION INTRAVENOUS at 14:27

## 2024-02-11 RX ADMIN — METOPROLOL TARTRATE 50 MG: 50 TABLET, FILM COATED ORAL at 09:35

## 2024-02-11 RX ADMIN — BACLOFEN 10 MG: 10 TABLET ORAL at 09:35

## 2024-02-11 RX ADMIN — BACLOFEN 10 MG: 10 TABLET ORAL at 15:32

## 2024-02-11 RX ADMIN — Medication 250 MG: at 09:35

## 2024-02-11 RX ADMIN — HYDROXYZINE HYDROCHLORIDE 25 MG: 25 TABLET ORAL at 20:47

## 2024-02-11 RX ADMIN — FAMOTIDINE 20 MG: 10 INJECTION INTRAVENOUS at 20:47

## 2024-02-11 RX ADMIN — HEPARIN SODIUM 5000 UNITS: 5000 INJECTION INTRAVENOUS; SUBCUTANEOUS at 09:35

## 2024-02-11 RX ADMIN — Medication 1 TABLET: at 09:35

## 2024-02-11 RX ADMIN — METOPROLOL TARTRATE 50 MG: 50 TABLET, FILM COATED ORAL at 20:47

## 2024-02-11 RX ADMIN — MORPHINE SULFATE 1 MG: 2 INJECTION, SOLUTION INTRAMUSCULAR; INTRAVENOUS at 15:32

## 2024-02-11 NOTE — PLAN OF CARE
Goal Outcome Evaluation:              Outcome Evaluation: Patient nonverbal; CARLI orientation status. Humidified trach collar; 8lpm. IVF/IV abx. Tolerating tube feedings, no residual. Afebrile. Turning Q2. Incontinent of bowel. Mojica catheter in place. L neph tube drsg changed; 15cc output noted. Accuchecks Q6. Tele reads NSR/ST. Heparin for VTE prevention. VSS. Safety maintained.

## 2024-02-11 NOTE — CASE MANAGEMENT/SOCIAL WORK
Continued Stay Note  [facility]     Patient Name: Namita Zabala  MRN: [id number]  Today's Date: 2/11/2024    Admit Date: 2/7/2024    Plan: TBD   Discharge Plan       Row Name 02/11/24 0958       Plan    Plan TBD    Plan Comments Received consult for transfer to [place] with name of Dr. Rey. SHASHA called [facility] [phone] and spoke to Rina. Rina has no information currently about a transfer (other than the transfer that was declined on 2/9), and Rina also says there are no beds as [place] is on diversion. MSW notified Dr. Charles of no beds but no sure he has spoken to Dr. Rey at this point.           SHASHA Brandt

## 2024-02-11 NOTE — PROGRESS NOTES
INFECTIOUS DISEASES PROGRESS NOTE    Patient:  Namita Zabala  YOB: 1975  MRN: [id number]   Admit date: 2024   Admitting Physician: Tae Charles MD  Primary Care Physician: David Lara MD    Chief Complaint: non verbal      Interval History: Phlebotomist having difficulty getting blood work.    Allergies:   Allergies   Allergen Reactions    Coconut Unknown - High Severity    Levaquin [Levofloxacin] Other (See Comments)     unknown    Nuts Unknown - High Severity    Penicillins Rash     Patient's father noted patient had taken penicillin, many years ago, many times and then started developing a rash when she would take it.  She has received cefepime, ceftriaxone and cephalexin with no known adverse problems    Tur[place] Other (See Comments)     Causes migraines per pt reports       Current Scheduled Medications:   baclofen, 10 mg, Per G Tube, Q6H  famotidine, 20 mg, Intravenous, Q12H  heparin (porcine), 5,000 Units, Subcutaneous, Q12H  meropenem, 1,000 mg, Intravenous, Q8H  metoprolol tartrate, 50 mg, Per PEG Tube, Q12H  multivitamin with minerals, 1 tablet, Oral, Daily  saccharomyces boulardii, 250 mg, Per G Tube, Daily  sodium chloride, 10 mL, Intravenous, Q12H      Current PRN Medications:    acetaminophen    albuterol    artificial tears    senna-docusate sodium **AND** polyethylene glycol **AND** bisacodyl **AND** bisacodyl    hydrOXYzine    Morphine    ondansetron    Pharmacy to Dose meropenem (MERREM)    [COMPLETED] Insert Peripheral IV **AND** sodium chloride    sodium chloride    sodium chloride    dextrose 5 % with KCl 20 mEq, 50 mL/hr, Last Rate: 50 mL/hr (24 0728)  Pharmacy to Dose meropenem (MERREM),            Objective     Vital Signs:  Temp (24hrs), Av.9 °F (36.6 °C), Min:97.4 °F (36.3 °C), Max:98.4 °F (36.9 °C)      /83 (BP Location: Right arm, Patient Position: Lying)   Pulse 97   Temp 98.4 °F (36.9 °C) (Axillary)   Resp 20   Ht 152.4 cm  "(60\")   Wt 55.1 kg (121 lb 7.6 oz)   LMP  (LMP Unknown)   SpO2 99%   BMI 23.72 kg/m²         Physical Exam:  General: Patient is quite contracted appearing lying in bed somewhat on her right side.  Phlebotomy at bedside trying to collect blood  Abdomen: G-tube in place without any erythema or drainage  Unable to visualize nephrostomy tube exit site.  Discussed with CARLEY Coleman and minimal output from nephrostomy tube    Results Review:    I reviewed the patient's new clinical results.    Lab Results:    CBC:   Lab Results   Lab 02/07/24  1416 02/08/24  0342 02/09/24  0316 02/10/24  1020 02/11/24  1241   WBC 15.14* 10.47 7.59 11.40* 7.99   HEMOGLOBIN 13.3 12.9 10.3* 10.8* 13.4   HEMATOCRIT 42.6 47.9* 34.5 32.6* 41.8   PLATELETS 312 210 158 168 178        AutoDiff:   Lab Results   Lab 02/09/24  0316 02/10/24  1020 02/11/24  1241   NEUTROPHIL % 70.3 82.3* 67.3   LYMPHOCYTE % 21.2 10.9* 21.2   MONOCYTES % 6.3 4.4* 5.8   EOSINOPHIL % 1.1 1.7 4.9   BASOPHIL % 0.7 0.4 0.3   NEUTROS ABS 5.34 9.40* 5.39   LYMPHS ABS 1.61 1.24 1.69   MONOS ABS 0.48 0.50 0.46   EOS ABS 0.08 0.19 0.39   BASOS ABS 0.05 0.04 0.02        Manual Diff:    Lab Results   Lab 02/08/24  0342 02/09/24  0316 02/10/24  1020   NEUTROS ABS 7.30* 5.34 9.40*           CMP:   Lab Results   Lab 02/07/24  1411 02/07/24  1743 02/09/24  1312 02/10/24  1020 02/11/24  1241   SODIUM 161*   < > 148* 143 141   POTASSIUM 4.0   < > 4.0 4.5 5.2   CHLORIDE 115*   < > 115* 111* 107   CO2 27.0   < > 26.0 21.0* 22.0   BUN 56*   < > 34* 27* 16   CREATININE 0.97   < > 0.46* 0.52* 0.45*   CALCIUM 11.4*   < > 9.7 9.6 10.1   BILIRUBIN 0.9  --   --   --   --    ALK PHOS 62  --   --   --   --    ALT (SGPT) 13  --   --   --   --    AST (SGOT) 20  --   --   --   --    GLUCOSE 161*   < > 122* 131* 128*    < > = values in this interval not displayed.       Estimated Creatinine Clearance: 105.2 mL/min (A) (by C-G formula based on SCr of 0.52 mg/dL (L)).    Culture " Results:    Microbiology Results (last 10 days)       Procedure Component Value - Date/Time    Urine Culture - Urine, Urine, Catheter [[id number]] Collected: 02/08/24 0910    Lab Status: Final result Specimen: Urine, Catheter Updated: 02/09/24 0925     Urine Culture >100,000 CFU/mL Normal Urogenital Brigida    Narrative:      Colonization of the urinary tract without infection is common. Treatment is discouraged unless the patient is symptomatic, pregnant, or undergoing an invasive urologic procedure.    Miscellaneous Micro Request - Specimen Not Sent, Specimen Not Sent [[id number]] Resulted: 02/08/24 0731    Lab Status: Final result Specimen: Specimen Not Sent Updated: 02/08/24 0731     Extra Tube --    Blood Culture - Blood, Hand, Digit Right [[id number]]  (Abnormal) Collected: 02/07/24 1420    Lab Status: Final result Specimen: Blood from Hand, Digit Right Updated: 02/09/24 0513     Blood Culture Staphylococcus, coagulase negative     Isolated from Pediatric Bottle     Gram Stain Pediatric Bottle Gram positive cocci in clusters    Narrative:      Probable contaminant requires clinical correlation, susceptibility not performed unless requested by physician.      Blood Culture ID, PCR - Blood, Hand, Digit Right [[id number]]  (Abnormal) Collected: 02/07/24 1420    Lab Status: Final result Specimen: Blood from Hand, Digit Right Updated: 02/08/24 1023     BCID, PCR Staph spp, not aureus or lugdunensis. Identification by BCID2 PCR.     BOTTLE TYPE Pediatric Bottle    Blood Culture - Blood, Foot, Right [[id number]]  (Normal) Collected: 02/07/24 1228    Lab Status: Preliminary result Specimen: Blood from Foot, Right Updated: 02/10/24 1301     Blood Culture No growth at 3 days    Urine Culture - Urine, Urine, Catheter [[id number]] Collected: 02/07/24 1218    Lab Status: Final result Specimen: Urine, Catheter Updated: 02/09/24 0846     Urine Culture >100,000 CFU/mL Mixed Brigida Isolated    Narrative:      Specimen contains mixed  organisms of questionable pathogenicity suggestive of contamination. If symptoms persist, suggest recollection.  Colonization of the urinary tract without infection is common. Treatment is discouraged unless the patient is symptomatic, pregnant, or undergoing an invasive urologic procedure.    COVID-19, FLU A/B, RSV PCR 1 HR TAT - Swab, Nasopharynx [[id number]]  (Normal) Collected: 02/07/24 1215    Lab Status: Final result Specimen: Swab from Nasopharynx Updated: 02/07/24 1340     COVID19 Not Detected     Influenza A PCR Not Detected     Influenza B PCR Not Detected     RSV, PCR Not Detected    Narrative:      Fact sheet for providers: https://www.fda.gov/media/970726/download    Fact sheet for patients: https://www.fda.gov/media/187671/download    Test performed by PCR.             01/29/2024 0413 02/08/2024 0731 Urine Culture - Urine, Indwelling Urethral Catheter [[id number]]     (Abnormal)   Urine from Indwelling Urethral Catheter    Edited Result - FINAL Component Value   Urine Culture >100,000 CFU/mL Escherichia coli ESBL Abnormal       Consider infectious disease consult.  Susceptibility results may not correlate to clinical outcomes.    >100,000 CFU/mL Proteus mirabilis Abnormal     >100,000 CFU/mL Pseudomonas aeruginosa Abnormal        Susceptibility     Escherichia coli ESBL Proteus mirabilis Pseudomonas aeruginosa     SANDEEP SANDEEP SANDEEP     Amikacin 4 Susceptible         Amoxicillin + Clavulanate   <=2 Susceptible       Ampicillin   <=2 Susceptible       Ampicillin + Sulbactam   <=2 Susceptible       Cefepime   <=1 Susceptible 8 Susceptible     Ceftazidime   <=1 Susceptible 4 Susceptible     Ceftriaxone   <=1 Susceptible       Ciprofloxacin     1 Intermediate     Ertapenem <=0.5 Susceptible <=0.5 Susceptible (C) 1       Gentamicin >=16 Resistant <=1 Susceptible       Imipenem   2 Intermediate (C) 1 <=0.25 Susceptible (C) 1     Levofloxacin >=8 Resistant 4 Resistant 4 Resistant     Meropenem <=0.25 Susceptible  <=0.25 Susceptible (C) 1 <=0.25 Susceptible (C) 1     Nitrofurantoin <=16 Susceptible 128 Resistant       Piperacillin + Tazobactam <=4 Susceptible <=4 Susceptible 8 Susceptible     Tobramycin >=16 Resistant   <=1 Susceptible     Trimethoprim + Sulfamethoxazole >=320 Resistant <=20 Susceptible                    1 Appended report. These results have been appended to a previously final verified report.             Radiology:       Imaging Results (Last 72 Hours)       Procedure Component Value Units Date/Time    XR Abdomen KUB [[id number]] Collected: 02/08/24 1322     Updated: 02/08/24 1327    Narrative:      EXAM: XR ABDOMEN KUB-      DATE: 2/8/2024 12:22 PM     HISTORY: Document correct placement of PEG tube replacement;      COMPARISON: 11/27/2023.     TECHNIQUE:   Frontal view of the abdomen. 1 image.     FINDINGS:    Contrast was injected into the patient's G-tube. Contrast opacifies the  stomach. There is no abnormal leakage of contrast. Bowel gas pattern  visualized is nonspecific with borderline air-filled loops of small  bowel and probable bowel gas distally in the colon. This may represent  ileus. Left ureteral stent is partially imaged.          Impression:         1. Contrast in the stomach with no abnormal leakage.  2. Probable ileus.     This report was signed and finalized on 2/8/2024 1:24 PM by Dominguez Barton.                   Active Hospital Problems    Diagnosis     **Severe sepsis     History of anoxic brain injury     Hypernatremia     Infection associated with nephrostomy catheter     Functional quadriplegia     Dysphagia     Severe malnutrition     Complicated UTI (urinary tract infection)        IMPRESSION:  Sepsis presumed on admission to be secondary to complicated urinary tract infection. Urine culture from January 29, 2024 positive for ESBL E. coli, Proteus mirabilis and Pseudomonas aeruginosa.  It appears patient had been on cephalexin based on nursing home MAR.  Urine culture obtained  February 7 from catheter from prior to admission revealed greater than 100,000 mixed oscar.  Patient had catheter exchanged, new specimen sent and mixed urogenital oscar was also isolated.  Patient's fever resolved rather quickly, white count normalized.  Heart rate significantly improved since admission.  Left ureteral stent overdue for exchange.  Nonobstructing right renal stones  Anoxic brain injury  Presence of trach  Hypernatremia-normalized   Penicillin allergy-updated information received from patient's father that her penicillin reaction was that of a rash many years ago.  Review of records reveal she has received cefepime, ceftriaxone and cephalexin here at [facility].      RECOMMENDATION:   Complete dosing of meropenem after last dose today given history of ESBL E. coli and Pseudomonas (ertapenem does not cover Pseudomonas)-and Proteus mirabilis from urine culture January 29 as this patient presented with high fever and leukocytosis that appears to have responded.Follow-up urine specimens and 1 before catheter change in 1 after catheter change only positive for normal urogenital oscar.  Will monitor closely off antibiotic therapy.    If patient has recurrent fever, will need to reevaluate at that time but may need specimen from nephrostomy tube.  See Dr. Charles's note regarding his discussion with Dr. Rey from [place]    .        Rachael Foy MD  02/11/24  12:29 CST

## 2024-02-11 NOTE — PLAN OF CARE
Goal Outcome Evaluation:              Outcome Evaluation: Patient remains at baseline orientation and is alert. Patient nephrosotomy tube output clear yellow with sediment. 1 bm. repositioned q 2. vss

## 2024-02-11 NOTE — PROGRESS NOTES
[facility] Medicine Services  INPATIENT PROGRESS NOTE    Patient Name: Namita Zabala  Date of Admission: 2/7/2024  Today's Date: 02/11/24  Length of Stay: 4  Primary Care Physician: David Lara MD    Subjective   Chief Complaint: Fevers  Fever        48-year-old female with history of anoxic brain injury, spastic quadriplegia, percutaneous endoscopic gastrostomy tube placement, severe malnutrition, frequent urinary tract infections with secondary sepsis, was admitted to the hospital December 2023 and treated with broad-spectrum antibiotics; history of mitral valve prolapse, nephrolithiasis, with a left percutaneous nephroureteral drain in place, last exchanged documented on January 2023.      Patient came from [facility]; as per family member patient has had a malfunctioning percutaneous gastrostomy tube for several days, and approximately 3 days ago developed fevers.  There is no other information available at this time.  Not able to provide any history due to neurological condition.    She was admitted to intensive care unit.    Discussed with family member yesterday afternoon.      Tracheostomy  Transferred to medical floor  No fevers reported  Laboratory tests again pending today          Review of Systems   Constitutional:  Positive for fever.      All pertinent negatives and positives are as above. All other systems have been reviewed and are negative unless otherwise stated.     Objective    Temp:  [97.4 °F (36.3 °C)-98.4 °F (36.9 °C)] 98.4 °F (36.9 °C)  Heart Rate:  [65-97] 97  Resp:  [20] 20  BP: ()/(45-83) 104/83  Physical Exam  Constitutional:       Appearance: Chronically ill-appearing.  Multiple contractures.  HENT:      Head: Normocephalic and atraumatic.      Nose: Nose normal.      Mouth/Throat:      Mouth: Mucous membranes are dry.     Pharynx: Able to evaluate  Eyes:      Extraocular Movements: Extraocular movements  preserved     Conjunctiva/sclera: Conjunctivae normal.      Pupils: Pupils are equal, round, and reactive to light.   Cardiovascular:      Rate and Rhythm: Tachycardic, normal rate and regular rhythm.      Pulses: Fast pulse  Pulmonary:      Effort: No tachypnea.     Breath sounds: Reduced breath sounds on the right due to positioning.  Abdominal:      General: Abdominal scar extensive, with no open wounds; there is a percutaneous gastrostomy tube in place.  Abdomen is nondistended.  Bowel sounds are diminished.  Genitourinary:  nephrostomy tube on the left.  Mojica catheter in place.     Palpations: Abdomen is soft.      Tenderness: There is no guarding or rebound.   Musculoskeletal:         General: Multiple upper and lower extremity flexion contractures; decreased range of motion.    Extremities: Multiple contractures as per musculoskeletal exam; no lower extremity edema.  Skin:     Capillary Refill: Capillary refill takes less than 2 seconds.      Coloration: Skin is not jaundiced.      Findings: No rash.   Neurological:      General: Spastic quadriplegia.  I am not able to assess patient's orientation or memory.  Speech:  with aphasia.   Psychiatric evaluation not able to be performed.      Results Review:  I have reviewed the labs, radiology results, and diagnostic studies.    Laboratory Data:   Results from last 7 days   Lab Units 02/10/24  1020 02/09/24  0316 02/08/24  0342   WBC 10*3/mm3 11.40* 7.59 10.47   HEMOGLOBIN g/dL 10.8* 10.3* 12.9   HEMATOCRIT % 32.6* 34.5 47.9*   PLATELETS 10*3/mm3 168 158 210        Results from last 7 days   Lab Units 02/10/24  1020 02/09/24  1312 02/09/24  0317 02/07/24  1743 02/07/24  1411   SODIUM mmol/L 143 148* 152*   < > 161*   POTASSIUM mmol/L 4.5 4.0 3.5   < > 4.0   CHLORIDE mmol/L 111* 115* 118*   < > 115*   CO2 mmol/L 21.0* 26.0 26.0   < > 27.0   BUN mg/dL 27* 34* 39*   < > 56*   CREATININE mg/dL 0.52* 0.46* 0.54*   < > 0.97   CALCIUM mg/dL 9.6 9.7 9.8   < > 11.4*    BILIRUBIN mg/dL  --   --   --   --  0.9   ALK PHOS U/L  --   --   --   --  62   ALT (SGPT) U/L  --   --   --   --  13   AST (SGOT) U/L  --   --   --   --  20   GLUCOSE mg/dL 131* 122* 172*   < > 161*    < > = values in this interval not displayed.       Culture Data:   Blood Culture   Date Value Ref Range Status   02/07/2024 Abnormal Stain (C)  Preliminary       Radiology Data:   Imaging Results (Last 24 Hours)       ** No results found for the last 24 hours. **            I have reviewed the patient's current medications.     Assessment/Plan   Assessment  Active Hospital Problems    Diagnosis     **Severe sepsis     History of anoxic brain injury     Hypernatremia     Infection associated with nephrostomy catheter     Functional quadriplegia     Dysphagia     Severe malnutrition     Complicated UTI (urinary tract infection)      Severe sepsis, resolved  Recurrent urinary tract infection, complicated, associated to nephrostomy tube/indwelling catheter/ureteral stent  Status post replacement of 18 Fr percutaneous endoscopic gastrostomy tube 2/08/24  Hypernatremia, resolved  Mild hypokalemia, resolved  History of anoxic brain injury 2022  Chronic left nephrostomy tube in place  Spastic quadriplegia  History of pulmonary embolism in 2021  History of COVID-19 in 2021        Sodium level improved from 162 to 143    Potassium 4.5.    Creatinine normal        CT scan report  1. A left-sided nephrostomy tube and ureteral stent remains in place and  well-positioned. There is mild progressive distention of the upper  tracts of the left kidney as well as of the proximal and mid left ureter  when compared to the previous exam. There is associated pyeloureteritis  with urothelial thickening and stranding surrounding the left renal  pelvis and proximal ureter. Dysfunction of the indwelling stent is  suspected. The right kidney demonstrates nonobstructing nephrolithiasis.  No right-sided ureteral stone is present.  2. Large  volume of stool within the colon. No fecal impaction within the  rectal vault. The stomach is moderately distended with air and fluid  with an air-fluid level but without definite gastric wall thickening or  convincing evidence of gastric outlet obstruction. No evidence of  mechanical obstruction.  3. Just to the left of midline at the umbilicus there is a small  abdominal wall defect. Slight protrusion of a portion of the transverse  colon into this defect is present without incarceration or obstruction.  4. The uterus and adnexa are unremarkable..     -No fevers in 48 hours  -Urine culture 1/29/24: E. coli ESBL sensitive to ertapenem and meropenem  Proteus mirabilis resistant to quinolones and nitrofurantoin  Pseudomonas resistant to Levaquin.  -Blood culture 2/7/24: staph coag negative x 1, probable contaminant  -Urine culture 2/7/24: mixed oscar      Treatment Plan  Continue meropenem IV, started January 7, 2024  Continue IV fluids D5 + KCl, decrease to 50 MLS per hour.   S/P percutaneous gastrostomy tube exchange.  Continue enteral feedings, per nutrition recommendations.     Mojica catheter exchanged  2/8/2024.    Urology consult noted.  Patient will need transfer for left nephroureteral stent exchange.      Plan to transfer to [place]: initially transfer was denied Friday; I was called back from the transfer center Friday afternoon.  Dr. Bond, urologist, contacted Dr. Rey, recommending exchange of this stent soon.  We will restart transfer to [place] tomorrow.   has been consulted for this matter.     Heparin subcutaneous for DVT prophylaxis.  Prognosis is poor given functional status.        Medical Decision Making  Number and Complexity of problems: 10, high complexity  Differential Diagnosis: See above    Conditions and Status        Condition is improving.     MDM Data  External documents reviewed: None  Cardiac tracing (EKG, telemetry) interpretation: Reviewed, sinus  tachycardia  Radiology interpretation: Radiology reports reviewed  Labs reviewed: Yes  Any tests that were considered but not ordered: No     Decision rules/scores evaluated (example [id number], [place], etc): None     Discussed with: Nurse staff and specialists     Care Planning  Shared decision making: With family  Code status and discussions: No chest compressions.    Disposition  Social Determinants of Health that impact treatment or disposition: Nursing home.  Bedbound.  I expect the patient to be transferred for nephroureteral stent exchange.    Electronically signed by Tae Charles MD, 02/11/24, 08:48 CST.

## 2024-02-12 PROBLEM — E87.0 HYPERNATREMIA: Status: RESOLVED | Noted: 2024-02-07 | Resolved: 2024-02-12

## 2024-02-12 PROBLEM — R65.20 SEVERE SEPSIS: Status: RESOLVED | Noted: 2023-11-25 | Resolved: 2024-02-12

## 2024-02-12 PROBLEM — A41.9 SEVERE SEPSIS: Status: RESOLVED | Noted: 2023-11-25 | Resolved: 2024-02-12

## 2024-02-12 LAB
BACTERIA SPEC AEROBE CULT: NORMAL
GLUCOSE BLDC GLUCOMTR-MCNC: 104 MG/DL (ref 70–130)
GLUCOSE BLDC GLUCOMTR-MCNC: 108 MG/DL (ref 70–130)
QT INTERVAL: 270 MS
QT INTERVAL: 278 MS
QTC INTERVAL: 401 MS
QTC INTERVAL: 429 MS

## 2024-02-12 PROCEDURE — 25010000002 MORPHINE PER 10 MG: Performed by: FAMILY MEDICINE

## 2024-02-12 PROCEDURE — 94799 UNLISTED PULMONARY SVC/PX: CPT

## 2024-02-12 PROCEDURE — 82948 REAGENT STRIP/BLOOD GLUCOSE: CPT

## 2024-02-12 PROCEDURE — 25010000002 HEPARIN (PORCINE) PER 1000 UNITS: Performed by: FAMILY MEDICINE

## 2024-02-12 PROCEDURE — 99232 SBSQ HOSP IP/OBS MODERATE 35: CPT | Performed by: CLINICAL NURSE SPECIALIST

## 2024-02-12 PROCEDURE — 99232 SBSQ HOSP IP/OBS MODERATE 35: CPT | Performed by: INTERNAL MEDICINE

## 2024-02-12 RX ORDER — SACCHAROMYCES BOULARDII 250 MG
250 CAPSULE ORAL EVERY MORNING
Qty: 30 CAPSULE | Refills: 0 | Status: CANCELLED | OUTPATIENT
Start: 2024-02-12 | End: 2024-03-13

## 2024-02-12 RX ADMIN — BACLOFEN 10 MG: 10 TABLET ORAL at 08:45

## 2024-02-12 RX ADMIN — HEPARIN SODIUM 5000 UNITS: 5000 INJECTION INTRAVENOUS; SUBCUTANEOUS at 08:20

## 2024-02-12 RX ADMIN — FAMOTIDINE 20 MG: 10 INJECTION INTRAVENOUS at 21:20

## 2024-02-12 RX ADMIN — FAMOTIDINE 20 MG: 10 INJECTION INTRAVENOUS at 08:20

## 2024-02-12 RX ADMIN — Medication 1 TABLET: at 08:20

## 2024-02-12 RX ADMIN — Medication 10 ML: at 21:20

## 2024-02-12 RX ADMIN — HEPARIN SODIUM 5000 UNITS: 5000 INJECTION INTRAVENOUS; SUBCUTANEOUS at 21:19

## 2024-02-12 RX ADMIN — MORPHINE SULFATE 1 MG: 2 INJECTION, SOLUTION INTRAMUSCULAR; INTRAVENOUS at 09:50

## 2024-02-12 RX ADMIN — HYDROXYZINE HYDROCHLORIDE 25 MG: 25 TABLET ORAL at 02:47

## 2024-02-12 RX ADMIN — BACLOFEN 10 MG: 10 TABLET ORAL at 02:47

## 2024-02-12 RX ADMIN — BACLOFEN 10 MG: 10 TABLET ORAL at 16:29

## 2024-02-12 RX ADMIN — BACLOFEN 10 MG: 10 TABLET ORAL at 21:19

## 2024-02-12 RX ADMIN — MORPHINE SULFATE 1 MG: 2 INJECTION, SOLUTION INTRAMUSCULAR; INTRAVENOUS at 02:47

## 2024-02-12 RX ADMIN — Medication 250 MG: at 08:20

## 2024-02-12 RX ADMIN — Medication 10 ML: at 08:20

## 2024-02-12 NOTE — PROGRESS NOTES
INFECTIOUS DISEASES PROGRESS NOTE    Patient:  Namita Zabala  YOB: 1975  MRN: [id number]   Admit date: 2024   Admitting Physician: Tae Charles MD  Primary Care Physician: David Lara MD    Chief Complaint: Unable to obtain chief complaint from patient as she is nonverbal    Interval History: No new problems reported.  Spoke with Dr. Charles.  He is hoping after discussions with [place] again on Friday, that patient may be able to be transferred there today.      Allergies:   Allergies   Allergen Reactions    Coconut Unknown - High Severity    Levaquin [Levofloxacin] Other (See Comments)     unknown    Nuts Unknown - High Severity    Penicillins Rash     Patient's father noted patient had taken penicillin, many years ago, many times and then started developing a rash when she would take it.  She has received cefepime, ceftriaxone and cephalexin with no known adverse problems    Turkey Other (See Comments)     Causes migraines per pt reports       Current Scheduled Medications:   baclofen, 10 mg, Per G Tube, Q6H  famotidine, 20 mg, Intravenous, Q12H  heparin (porcine), 5,000 Units, Subcutaneous, Q12H  metoprolol tartrate, 50 mg, Per PEG Tube, Q12H  multivitamin with minerals, 1 tablet, Oral, Daily  saccharomyces boulardii, 250 mg, Per G Tube, Daily  sodium chloride, 10 mL, Intravenous, Q12H      Current PRN Medications:    acetaminophen    albuterol    artificial tears    senna-docusate sodium **AND** polyethylene glycol **AND** bisacodyl **AND** bisacodyl    hydrOXYzine    Morphine    ondansetron    Pharmacy to Dose meropenem (MERREM)    [COMPLETED] Insert Peripheral IV **AND** sodium chloride    sodium chloride    sodium chloride    Pharmacy to Dose meropenem (MERREM),            Objective     Vital Signs:  Temp (24hrs), Av.2 °F (36.8 °C), Min:97.7 °F (36.5 °C), Max:98.5 °F (36.9 °C)      BP 95/56 (BP Location: Right leg, Patient Position: Lying)   Pulse 77   Temp  "97.7 °F (36.5 °C) (Axillary)   Resp 16   Ht 152.4 cm (60\")   Wt 54 kg (119 lb 0.8 oz)   LMP  (LMP Unknown)   SpO2 96%   BMI 23.25 kg/m²         Physical Exam:  General: The patient is contracted sitting up in bed leaning towards the right.  Neck: Significantly contracted with patient leaning to the right and forward  Abdomen: PEG tube in place with dressing clean dry and intact  Nephrostomy tube in place and bag nearly empty       Results Review:    I reviewed the patient's new clinical results.    Lab Results:    CBC:   Lab Results   Lab 02/07/24  1416 02/08/24  0342 02/09/24  0316 02/10/24  1020 02/11/24  1241   WBC 15.14* 10.47 7.59 11.40* 7.99   HEMOGLOBIN 13.3 12.9 10.3* 10.8* 13.4   HEMATOCRIT 42.6 47.9* 34.5 32.6* 41.8   PLATELETS 312 210 158 168 178        AutoDiff:   Lab Results   Lab 02/09/24  0316 02/10/24  1020 02/11/24  1241   NEUTROPHIL % 70.3 82.3* 67.3   LYMPHOCYTE % 21.2 10.9* 21.2   MONOCYTES % 6.3 4.4* 5.8   EOSINOPHIL % 1.1 1.7 4.9   BASOPHIL % 0.7 0.4 0.3   NEUTROS ABS 5.34 9.40* 5.39   LYMPHS ABS 1.61 1.24 1.69   MONOS ABS 0.48 0.50 0.46   EOS ABS 0.08 0.19 0.39   BASOS ABS 0.05 0.04 0.02        Manual Diff:    Lab Results   Lab 02/09/24  0316 02/10/24  1020 02/11/24  1241   NEUTROS ABS 5.34 9.40* 5.39           CMP:   Lab Results   Lab 02/07/24  1411 02/07/24  1743 02/09/24  1312 02/10/24  1020 02/11/24  1241   SODIUM 161*   < > 148* 143 141   POTASSIUM 4.0   < > 4.0 4.5 5.2   CHLORIDE 115*   < > 115* 111* 107   CO2 27.0   < > 26.0 21.0* 22.0   BUN 56*   < > 34* 27* 16   CREATININE 0.97   < > 0.46* 0.52* 0.45*   CALCIUM 11.4*   < > 9.7 9.6 10.1   BILIRUBIN 0.9  --   --   --   --    ALK PHOS 62  --   --   --   --    ALT (SGPT) 13  --   --   --   --    AST (SGOT) 20  --   --   --   --    GLUCOSE 161*   < > 122* 131* 128*    < > = values in this interval not displayed.       Estimated Creatinine Clearance: 133.2 mL/min (A) (by C-G formula based on SCr of 0.45 mg/dL (L)).    Culture " Results:    Microbiology Results (last 10 days)       Procedure Component Value - Date/Time    Urine Culture - Urine, Urine, Catheter [[id number]] Collected: 02/08/24 0910    Lab Status: Final result Specimen: Urine, Catheter Updated: 02/09/24 0925     Urine Culture >100,000 CFU/mL Normal Urogenital Brigida    Narrative:      Colonization of the urinary tract without infection is common. Treatment is discouraged unless the patient is symptomatic, pregnant, or undergoing an invasive urologic procedure.    Miscellaneous Micro Request - Specimen Not Sent, Specimen Not Sent [[id number]] Resulted: 02/08/24 0731    Lab Status: Final result Specimen: Specimen Not Sent Updated: 02/08/24 0731     Extra Tube --    Blood Culture - Blood, Hand, Digit Right [[id number]]  (Abnormal) Collected: 02/07/24 1420    Lab Status: Final result Specimen: Blood from Hand, Digit Right Updated: 02/09/24 0513     Blood Culture Staphylococcus, coagulase negative     Isolated from Pediatric Bottle     Gram Stain Pediatric Bottle Gram positive cocci in clusters    Narrative:      Probable contaminant requires clinical correlation, susceptibility not performed unless requested by physician.      Blood Culture ID, PCR - Blood, Hand, Digit Right [[id number]]  (Abnormal) Collected: 02/07/24 1420    Lab Status: Final result Specimen: Blood from Hand, Digit Right Updated: 02/08/24 1023     BCID, PCR Staph spp, not aureus or lugdunensis. Identification by BCID2 PCR.     BOTTLE TYPE Pediatric Bottle    Blood Culture - Blood, Foot, Right [[id number]]  (Normal) Collected: 02/07/24 1228    Lab Status: Preliminary result Specimen: Blood from Foot, Right Updated: 02/11/24 1301     Blood Culture No growth at 4 days    Urine Culture - Urine, Urine, Catheter [[id number]] Collected: 02/07/24 1218    Lab Status: Final result Specimen: Urine, Catheter Updated: 02/09/24 0846     Urine Culture >100,000 CFU/mL Mixed Brigida Isolated    Narrative:      Specimen contains mixed  organisms of questionable pathogenicity suggestive of contamination. If symptoms persist, suggest recollection.  Colonization of the urinary tract without infection is common. Treatment is discouraged unless the patient is symptomatic, pregnant, or undergoing an invasive urologic procedure.    COVID-19, FLU A/B, RSV PCR 1 HR TAT - Swab, Nasopharynx [[id number]]  (Normal) Collected: 02/07/24 1215    Lab Status: Final result Specimen: Swab from Nasopharynx Updated: 02/07/24 1340     COVID19 Not Detected     Influenza A PCR Not Detected     Influenza B PCR Not Detected     RSV, PCR Not Detected    Narrative:      Fact sheet for providers: https://www.fda.gov/media/327915/download    Fact sheet for patients: https://www.fda.gov/media/448483/download    Test performed by PCR.             01/29/2024 0413 02/08/2024 0731 Urine Culture - Urine, Indwelling Urethral Catheter [[id number]]     (Abnormal)   Urine from Indwelling Urethral Catheter    Edited Result - FINAL Component Value   Urine Culture >100,000 CFU/mL Escherichia coli ESBL Abnormal       Consider infectious disease consult.  Susceptibility results may not correlate to clinical outcomes.    >100,000 CFU/mL Proteus mirabilis Abnormal     >100,000 CFU/mL Pseudomonas aeruginosa Abnormal        Susceptibility     Escherichia coli ESBL Proteus mirabilis Pseudomonas aeruginosa     SANDEEP SANDEEP SANDEEP     Amikacin 4 Susceptible         Amoxicillin + Clavulanate   <=2 Susceptible       Ampicillin   <=2 Susceptible       Ampicillin + Sulbactam   <=2 Susceptible       Cefepime   <=1 Susceptible 8 Susceptible     Ceftazidime   <=1 Susceptible 4 Susceptible     Ceftriaxone   <=1 Susceptible       Ciprofloxacin     1 Intermediate     Ertapenem <=0.5 Susceptible <=0.5 Susceptible (C) 1       Gentamicin >=16 Resistant <=1 Susceptible       Imipenem   2 Intermediate (C) 1 <=0.25 Susceptible (C) 1     Levofloxacin >=8 Resistant 4 Resistant 4 Resistant     Meropenem <=0.25 Susceptible  <=0.25 Susceptible (C) 1 <=0.25 Susceptible (C) 1     Nitrofurantoin <=16 Susceptible 128 Resistant       Piperacillin + Tazobactam <=4 Susceptible <=4 Susceptible 8 Susceptible     Tobramycin >=16 Resistant   <=1 Susceptible     Trimethoprim + Sulfamethoxazole >=320 Resistant <=20 Susceptible                    1 Appended report. These results have been appended to a previously final verified report.             Radiology:       Imaging Results (Last 72 Hours)       ** No results found for the last 72 hours. **                Active Hospital Problems    Diagnosis     **Severe sepsis     History of anoxic brain injury     Hypernatremia     Infection associated with nephrostomy catheter     Functional quadriplegia     Dysphagia     Severe malnutrition     Complicated UTI (urinary tract infection)        IMPRESSION:  Sepsis on admission that was presumed to be secondary to complicated urinary tract infection in patient with indwelling Mojica catheter and nephro ureteral stent. Urine culture from January 29, 2024 positive for ESBL E. coli, Proteus mirabilis and Pseudomonas aeruginosa.  It appears patient had been on cephalexin based on nursing home MAR.  Urine culture obtained February 7 from catheter from prior to admission revealed greater than 100,000 mixed oscar.  Patient had catheter exchanged, new specimen sent and mixed urogenital ocsar was also isolated.  Patient had fairly quick improvement of leukocytosis, tachycardia and fever.  Left nephroureteral stent overdue for exchange.  Nonobstructing right renal stones  Anoxic brain injury  Presence of trach  Penicillin allergy-updated information received from patient's father that her penicillin reaction was that of a rash many years ago.  Review of records reveal she has received cefepime, ceftriaxone and cephalexin here at [facility].        RECOMMENDATION:   Monitor closely off meropenem.  That was started based on history of ESBL E. coli, Pseudomonasand Proteus  mirabilis from urine culture January 29 as this patient presented with high fever and leukocytosis and was on Keflex as an outpatient per nursing home MAR.  If patient has recurrent fever, will need to reevaluate at that time but may need specimen from nephrostomy tube and empiric reinstitution of antibiotics.  Would expect urology to order periprocedure antibiotics when patient gets her left nephroureteral stent exchanged.    Discussed with Dr. Charles  Discussed with ORAL Salazar MD  02/12/24  11:28 CST

## 2024-02-12 NOTE — PROGRESS NOTES
[facility] Medicine Services  INPATIENT PROGRESS NOTE    Patient Name: Namita Zabala  Date of Admission: 2/7/2024  Today's Date: 02/12/24  Length of Stay: 5  Primary Care Physician: David Lara MD    Subjective   Chief Complaint: Fevers  Addendum    Fever        48-year-old female with history of anoxic brain injury, spastic quadriplegia, percutaneous endoscopic gastrostomy tube placement, severe malnutrition, frequent urinary tract infections with secondary sepsis, was admitted to the hospital December 2023 and treated with broad-spectrum antibiotics; history of mitral valve prolapse, nephrolithiasis, with a left percutaneous nephroureteral drain in place, last exchanged documented on January 2023.      Patient came from [facility]; as per family member patient has had a malfunctioning percutaneous gastrostomy tube for several days, and approximately 3 days ago developed fevers.  There is no other information available at this time.  Not able to provide any history due to neurological condition.    She was admitted to intensive care unit.    Discussed with family member yesterday afternoon.      Tracheostomy  On medical floor  No fevers reported  No other events reported  Unable to obtain labs today          Review of Systems   Constitutional:  Positive for fever.      All pertinent negatives and positives are as above. All other systems have been reviewed and are negative unless otherwise stated.     Objective    Temp:  [98.1 °F (36.7 °C)-98.7 °F (37.1 °C)] 98.5 °F (36.9 °C)  Heart Rate:  [66-98] 74  Resp:  [20] 20  BP: ()/(54-80) 110/54  Physical Exam  Constitutional:       Appearance: Chronically ill-appearing.  Multiple contractures.  HENT:      Head: Normocephalic and atraumatic.      Nose: Nose normal.      Mouth/Throat:      Mouth: Mucous membranes are dry.     Pharynx: Able to evaluate  Eyes:      Extraocular Movements: Extraocular  movements preserved     Conjunctiva/sclera: Conjunctivae normal.      Pupils: Pupils are equal, round, and reactive to light.   Cardiovascular:      Rate and Rhythm: Tachycardic, normal rate and regular rhythm.      Pulses: Fast pulse  Pulmonary:      Effort: No tachypnea.     Breath sounds: Reduced breath sounds on the right due to positioning.  Abdominal:      General: Abdominal scar extensive, with no open wounds; there is a percutaneous gastrostomy tube in place.  Abdomen is nondistended.  Bowel sounds are diminished.  Genitourinary:  nephrostomy tube on the left.  Mojica catheter in place.     Palpations: Abdomen is soft.      Tenderness: There is no guarding or rebound.   Musculoskeletal:         General: Multiple upper and lower extremity flexion contractures; decreased range of motion.    Extremities: Multiple contractures as per musculoskeletal exam; no lower extremity edema.  Skin:     Capillary Refill: Capillary refill takes less than 2 seconds.      Coloration: Skin is not jaundiced.      Findings: No rash.   Neurological:      General: Spastic quadriplegia.  I am not able to assess patient's orientation or memory.  Speech:  with aphasia.   Psychiatric evaluation not able to be performed.      Results Review:  I have reviewed the labs, radiology results, and diagnostic studies.    Laboratory Data:   Results from last 7 days   Lab Units 02/11/24  1241 02/10/24  1020 02/09/24  0316   WBC 10*3/mm3 7.99 11.40* 7.59   HEMOGLOBIN g/dL 13.4 10.8* 10.3*   HEMATOCRIT % 41.8 32.6* 34.5   PLATELETS 10*3/mm3 178 168 158        Results from last 7 days   Lab Units 02/11/24  1241 02/10/24  1020 02/09/24  1312 02/07/24  1743 02/07/24  1411   SODIUM mmol/L 141 143 148*   < > 161*   POTASSIUM mmol/L 5.2 4.5 4.0   < > 4.0   CHLORIDE mmol/L 107 111* 115*   < > 115*   CO2 mmol/L 22.0 21.0* 26.0   < > 27.0   BUN mg/dL 16 27* 34*   < > 56*   CREATININE mg/dL 0.45* 0.52* 0.46*   < > 0.97   CALCIUM mg/dL 10.1 9.6 9.7   < > 11.4*    BILIRUBIN mg/dL  --   --   --   --  0.9   ALK PHOS U/L  --   --   --   --  62   ALT (SGPT) U/L  --   --   --   --  13   AST (SGOT) U/L  --   --   --   --  20   GLUCOSE mg/dL 128* 131* 122*   < > 161*    < > = values in this interval not displayed.       Culture Data:   Blood Culture   Date Value Ref Range Status   02/07/2024 Abnormal Stain (C)  Preliminary       Radiology Data:   Imaging Results (Last 24 Hours)       ** No results found for the last 24 hours. **            I have reviewed the patient's current medications.     Assessment/Plan   Assessment  Active Hospital Problems    Diagnosis     **Severe sepsis     History of anoxic brain injury     Hypernatremia     Infection associated with nephrostomy catheter     Functional quadriplegia     Dysphagia     Severe malnutrition     Complicated UTI (urinary tract infection)      Severe sepsis, resolved  Recurrent urinary tract infection, complicated, associated to nephrostomy tube/indwelling catheter/ureteral stent  Status post replacement of 18 Fr percutaneous endoscopic gastrostomy tube 2/08/24  Hypernatremia, resolved  Mild hypokalemia, resolved  History of anoxic brain injury 2022  Chronic left nephrostomy tube in place  Spastic quadriplegia  History of pulmonary embolism in 2021  History of COVID-19 in 2021             CT scan report  1. A left-sided nephrostomy tube and ureteral stent remains in place and  well-positioned. There is mild progressive distention of the upper  tracts of the left kidney as well as of the proximal and mid left ureter  when compared to the previous exam. There is associated pyeloureteritis  with urothelial thickening and stranding surrounding the left renal  pelvis and proximal ureter. Dysfunction of the indwelling stent is  suspected. The right kidney demonstrates nonobstructing nephrolithiasis.  No right-sided ureteral stone is present.  2. Large volume of stool within the colon. No fecal impaction within the  rectal vault. The  stomach is moderately distended with air and fluid  with an air-fluid level but without definite gastric wall thickening or  convincing evidence of gastric outlet obstruction. No evidence of  mechanical obstruction.  3. Just to the left of midline at the umbilicus there is a small  abdominal wall defect. Slight protrusion of a portion of the transverse  colon into this defect is present without incarceration or obstruction.  4. The uterus and adnexa are unremarkable..     -No fevers in 48 hours  -Urine culture 1/29/24: E. coli ESBL sensitive to ertapenem and meropenem  Proteus mirabilis resistant to quinolones and nitrofurantoin  Pseudomonas resistant to Levaquin.  -Blood culture 2/7/24: staph coag negative x 1, probable contaminant  -Urine culture 2/7/24: mixed oscar      Treatment Plan      Received meropenem IV, 02/07 to 02/11; antibiotics per ID     S/P percutaneous gastrostomy tube exchange.  Continue enteral feedings, per nutrition recommendations.     Mojica catheter exchanged  2/8/2024.      Urology consult noted.  Patient will need transfer for left nephroureteral stent exchange.    Plan to transfer to [place]: initially transfer was denied Friday; I was called back from the transfer center Friday afternoon.  Dr. Bond, urologist, contacted Dr. Rey, recommending exchange of this stent soon.  We will restart transfer to [place].  has been consulted for this matter.      Heparin subcutaneous for DVT prophylaxis.  Prognosis is poor given functional status.        Medical Decision Making  Number and Complexity of problems: 10, high complexity  Differential Diagnosis: See above    Conditions and Status        Condition is improving.     MDM Data  External documents reviewed: None  Cardiac tracing (EKG, telemetry) interpretation: Reviewed, sinus tachycardia  Radiology interpretation: Radiology reports reviewed  Labs reviewed: Yes  Any tests that were considered but not ordered: No      Decision rules/scores evaluated (example [id number], Wells, etc): None     Discussed with: Nurse staff and specialists     Care Planning  Shared decision making: With family  Code status and discussions: No chest compressions.    Disposition  Social Determinants of Health that impact treatment or disposition: Nursing home.  Bedbound.  I expect the patient to be transferred for nephroureteral stent exchange.    Electronically signed by Tae Charles MD, 02/12/24, 09:17 CST.         Addendum  February 12, 2024     Prior records reviewed.    Last nephroureteral exchange performed January 4, 2023 at [facility] interventional radiology.    [facility] at full capacity.    I reviewed patient's urologist communication to family member which included a recommendation for a Lasix renal scan.  Will order this test here at [facility], Nuclear Medicine renal flow and function with Lasix administration, to evaluate function and any obstruction/hydronephrosis.  After this test, will better assess the need for additional interventions in the future.    I have called patient's urologist answering service, Dr. Rey, and waiting for her callback.  In the meantime, will try to arrange for [facility] interventional radiology appointment for nephroureteral stent exchange if needed.    Electronically signed by Tae Charles MD, 02/12/24, 4:18 PM CST.

## 2024-02-12 NOTE — CASE MANAGEMENT/SOCIAL WORK
Continued Stay Note   [place]     Patient Name: Namita Zabala  MRN: [id number]  Today's Date: 2/12/2024    Admit Date: 2/7/2024    Plan: TBD   Discharge Plan       Row Name 02/12/24 1604       Plan    Plan Comments Spoke with the [facility] [place] [phone] and they are still at highest capacity. Informed Dr. Charles. He has a call out to pt's urologist Dr. Rey and is requesting an appointment be made for  IR so pt can go back to [facility] and then go at a later date. Charge nurse aware.                   Discharge Codes    No documentation.                       ORAL Mcintyre

## 2024-02-12 NOTE — PLAN OF CARE
Goal Outcome Evaluation:  Plan of Care Reviewed With: patient        Progress: no change  Outcome Evaluation: iid patent. peg tube with tube feeding in progress. stratton to bsd with yellow urine. left veronica tube to bsd- no output this shift, drainage in tubing appears purulent. turn and reposition q2h. remains nonverbal. no indications of pain noted. bony prominences padded and prevalon boots in place. spoke with dr gardner who indicates would like the contact info for urologist at [place] to possibly get an appt to replace neph tube soon so we can send her back to skilled care.  called [facility] and got md name, obtained ph# and gave info to md. no acute distress noted. cont to monitor.

## 2024-02-12 NOTE — PROGRESS NOTES
[facility] Palliative Care Services    Palliative Care Daily Progress Note   Chief complaint-follow up support for patient/family    Code Status:   Code Status and Medical Interventions:   Ordered at: 24 1022     Level Of Support Discussed With:    Health Care Surrogate     Code Status (Patient has no pulse and is not breathing):    No CPR (Do Not Attempt to Resuscitate)     Medical Interventions (Patient has pulse or is breathing):    Full Support      Advanced Directives: Advance Directive Status: Patient has advance directive, copy in chart   Goals of Care: Ongoing.     S: Medical record reviewed. Events noted.  Underwent successful replacement of PEG tube by Dr. Ramos on .  Infectious disease following.  Anticipating transfer to tertiary facility for ureteral stent exchange soon per attending physician.  Laying in bed without apparent distress with eyes closed.  No family at bedside.      Review of Systems   Unable to perform ROS: Acuity of condition     Pain Assessment  CPOT and PAINAD Scales: PAINAD (Pain Assessment in Advance Dementia Scale)  CPOT Facial Expression: 0-->relaxed, neutral  CPOT Body Movements: 0-->absence of movements  CPOT Muscle Tension: 0-->relaxed  Ventilator Compliance/Vocalization: 0-->talking in normal tone or no sound  CPOT Score: 0  PAINAD Breathin-->normal  PAINAD Negative Vocalization: 0-->none  PAINAD Facial Expression: 0-->smiling or inexpressive  PAINAD Body Language: 0-->relaxed  PAINAD Consolability: 0-->no need to console  PAINAD Score: 0    O:     Intake/Output Summary (Last 24 hours) at 2024 1525  Last data filed at 2024 1207  Gross per 24 hour   Intake 2299 ml   Output 1500 ml   Net 799 ml       Diagnostics and current medications: Reviewed.      Current Facility-Administered Medications:     acetaminophen (TYLENOL) suppository 325 mg, 325 mg, Rectal, Q4H PRN, Tae Charles MD, 325 mg at 24 0856    albuterol  (PROVENTIL) nebulizer solution 0.083% 2.5 mg/3mL, 2.5 mg, Nebulization, Q4H PRN, Tae Charles MD    artificial tears ophthalmic ointment, , Both Eyes, Q1H PRN, Tae Charles MD    baclofen (LIORESAL) tablet 10 mg, 10 mg, Per G Tube, Q6H, Tae Charles MD, 10 mg at 02/12/24 0845    sennosides-docusate (PERICOLACE) 8.6-50 MG per tablet 2 tablet, 2 tablet, Oral, BID PRN **AND** polyethylene glycol (MIRALAX) packet 17 g, 17 g, Oral, Daily PRN **AND** bisacodyl (DULCOLAX) EC tablet 5 mg, 5 mg, Oral, Daily PRN **AND** bisacodyl (DULCOLAX) suppository 10 mg, 10 mg, Rectal, Daily PRN, Tae Charles MD    famotidine (PEPCID) injection 20 mg, 20 mg, Intravenous, Q12H, Tae Charles MD, 20 mg at 02/12/24 0820    heparin (porcine) 5000 UNIT/ML injection 5,000 Units, 5,000 Units, Subcutaneous, Q12H, Tae Charles MD, 5,000 Units at 02/12/24 0820    hydrOXYzine (ATARAX) tablet 25 mg, 25 mg, Per G Tube, Q6H PRN, Tae Charles MD, 25 mg at 02/12/24 0247    metoprolol tartrate (LOPRESSOR) tablet 50 mg, 50 mg, Per PEG Tube, Q12H, Priscilla Tierney MD, 50 mg at 02/11/24 2047    Morphine sulfate (PF) injection 1 mg, 1 mg, Intravenous, Q4H PRN, Tae Charles MD, 1 mg at 02/12/24 0950    multivitamin with minerals 1 tablet, 1 tablet, Oral, Daily, Tae Charles MD, 1 tablet at 02/12/24 0820    ondansetron (ZOFRAN) injection 4 mg, 4 mg, Intravenous, Q6H PRN, Tae Charles MD    saccharomyces boulardii (FLORASTOR) capsule 250 mg, 250 mg, Per G Tube, Daily, Tae Charles MD, 250 mg at 02/12/24 0820    [COMPLETED] Insert Peripheral IV, , , Once **AND** sodium chloride 0.9 % flush 10 mL, 10 mL, Intravenous, PRN, Dea Lopez MD    sodium chloride 0.9 % flush 10 mL, 10 mL, Intravenous, Q12H, Tae Charles MD, 10 mL at 02/12/24 0820    sodium chloride 0.9 % flush 10 mL, 10 mL, Intravenous, PRN, Tae Charles MD    sodium chloride 0.9 % infusion 40 mL, 40 mL, Intravenous,  "Araceli HARMAN Carlos F, MD    Allergies   Allergen Reactions    Coconut Unknown - High Severity    Levaquin [Levofloxacin] Other (See Comments)     unknown    Nuts Unknown - High Severity    Penicillins Rash     Patient's father noted patient had taken penicillin, many years ago, many times and then started developing a rash when she would take it.  She has received cefepime, ceftriaxone and cephalexin with no known adverse problems    Turkey Other (See Comments)     Causes migraines per pt reports     I have utilized all available immediate resources to obtain, update, or review the patient's current medications (including all prescriptions, over-the-counter products, herbals, cannabis/cannabidiol products, and vitamin/mineral/dietary (nutritional) supplements) for name, route of administration, type, dose and frequency.    A:    /54 (BP Location: Right leg, Patient Position: Lying)   Pulse 93   Temp 97.3 °F (36.3 °C) (Axillary)   Resp 16   Ht 152.4 cm (60\")   Wt 54 kg (119 lb 0.8 oz)   LMP  (LMP Unknown)   SpO2 100%   BMI 23.25 kg/m²     Vitals and nursing note reviewed.   Constitutional:       Appearance: Not in distress. Ill-appearing and chronically ill-appearing.      Comments: Trach collar, tube feeds infusing  Eyes:      General: Lids are normal.   HENT:      Head: Normocephalic.   Neck:      Trachea: Tracheostomy present.   Pulmonary:      Effort: Pulmonary effort is normal.   Cardiovascular:      Normal rate present.   Edema:     Peripheral edema present.  Musculoskeletal:      Comments: Contractures upper and lower extremities   Skin:     General: Skin is warm.   Genitourinary:     Comments: Left-sided nephrostomy tube, Mojica catheter in place  Neurological:      Mental Status: Alert.      Comments: Nonverbal.  Eyes closed  Psychiatric:         Speech: Noncommunicative.         Cognition and Memory: Cognition is impaired.      Patient status: Disease state: Controlled with current " treatments.  Current Functional status: Palliative Performance Scale Score: Performance 10% based on the following measures: Ambulation: Totally bed bound, Activity and Evidence of Disease: Unable to do any work, extensive evidence of disease, Self-Care: Total care required,  Intake: Mouth care only, LOC: Drowsy or comatose   Baseline Functional status: Palliative Performance Scale Score: Performance 10% based on the following measures: Ambulation: Totally bed bound, Activity and Evidence of Disease: Unable to do any work, extensive evidence of disease, Self-Care: Total care required,  Intake: Mouth care only, LOC: Drowsy or comatose   Nutritional status: Albumin 4.9 Body mass index is 23.47 kg/m².      Hospital Problem List      Severe sepsis    Complicated UTI (urinary tract infection)    Severe malnutrition    Functional quadriplegia    Dysphagia    Infection associated with nephrostomy catheter    History of anoxic brain injury    Hypernatremia     Impression/Problem List:     Severe sepsis  Complicated UTI with frequent UTIs  Pyeloureteritis, dysfunction of the indwelling stent is suspected per CT  Chronic respiratory failure with hypoxia  History of anoxic brain injury  Functional quadriplegia  Hypernatremia  History of retroperitoneal hematoma and hematoma with abscess leading to necrotizing infection of the pelvis and bladder rupture s/p nephroureteral stent placement,  Tracheostomy present  Severe malnutrition  Hypertension  Left-sided nephrostomy tube in place  Chronic pulmonary embolism  Iron deficiency anemia  Migraine  Mitral valve prolapse  Dysphagia s/p PEG  Contractures  Debility            Recommendations/Plan:  1. plan: Goals of care include CODE STATUS no CPR/full support interventions.     Family support: The patient receives support from her .  Advance Directives: No advance directive on file     POA/Healthcare surrogate-spouse, Grant.     2.  Palliative care encounter  - Prognosis is poor  long-term secondary to severe sepsis, complicated UTI with frequent UTIs, suspected dysfunction, with indwelling stent, respiratory failure, anoxic brain injury, quadriplegia, debility, and multiple comorbidities.  -Family appears to have poor prognostic awareness.      -Resident of [facility].       -Last seen by this provider during November hospitalization.  A MOST document was completed on 11/27 to include no CPR/limited support interventions, no intubation, no NIPPV.       -Urine and blood cultures pending.  Treated with IV antibiotics and IV fluids.   -Infectious disease, GI, and urology consulted.    -Evaluated by speech therapy and not appropriate for skilled intervention as she does not follow commands and not appropriate for swallow evaluation.     2/8-CODE STATUS clarified.  Will plan to complete a MOST document to include no CPR/full support interventions.    -Spouse wishes to pursue PEG tube exchange and wishes for patient to be transferred to tertiary facility for urologic stent exchange and prefers [facility], Dr. Rey.    -family not prepared to de-escalate care measures at this juncture and we will continue to provide family support as needed.    2/12-continue supportive measures  -plans to transfer to tertiary facility for ureteral stent exchange     Thank you for allowing us to participate in patient's plan of care. Palliative Care Team will continue to follow patient.     Roxane Sahni, APRN  2/12/2024  15:25 CST

## 2024-02-12 NOTE — PROGRESS NOTES
Malnutrition Severity Assessment    Patient Name:  Namita Zabala  YOB: 1975  MRN: [id number]  Admit Date:  2/7/2024    Patient meets criteria for : Severe Malnutrition      Malnutrition Severity Assessment  Malnutrition Type: Acute Disease or Injury - Related Malnutrition  Malnutrition Type (last 8 hours)       Malnutrition Severity Assessment       Row Name 02/12/24 1649       Malnutrition Severity Assessment    Malnutrition Type Acute Disease or Injury - Related Malnutrition      Row Name 02/12/24 1649       Insufficient Energy Intake     Insufficient Energy Intake Findings Severe    Insufficient Energy Intake  < or equal to 50% of est. energy requirement for > or equal to 5d)  malfunctioning PEG tube upon admission      Row Name 02/12/24 1649       Unintentional Weight Loss     Unintentional Weight Loss Findings Severe    Unintentional Weight Loss  Weight loss greater than 5% in one month  11.8% loss in the last month      Row Name 02/12/24 1649       Muscle Loss    Loss of Muscle Mass Findings Moderate    Taoism Region Moderate - slight depression    Clavicle Bone Region Moderate - some protrusion in females, visible in males    Acromion Bone Region Moderate - acromion may slightly protrude      Row Name 02/12/24 1649       Fat Loss    Subcutaneous Fat Loss Findings Moderate    Orbital Region  Moderate -  somewhat hollowness, slightly dark circles    Upper Arm Region Moderate - some fat tissue, not ample      Row Name 02/12/24 1649       Declining Functional Status    Declining Functional Status Findings Measurably Reduced      Row Name 02/12/24 1649       Criteria Met (Must meet criteria for severity in at least 2 of these categories: M Wasting, Fat Loss, Fluid, Secondary Signs, Wt. Status, Intake)    Patient meets criteria for  Severe Malnutrition                    Electronically signed by:  Kayleen Ballesteros RDN, LD  02/12/24 16:52 CST

## 2024-02-12 NOTE — PLAN OF CARE
Goal Outcome Evaluation:  Plan of Care Reviewed With: patient        Progress: no change  Outcome Evaluation: Ntn follow up. Pt cont on TF of Peptamen 1.5 at goal rate of 45mL/hour. She is tolerating TF with minimal residuals. Last checked residuals were 10mL. Plans in place for transfer to tertiary care center for L nephroureteral stent exchange. Pt has remained non-verbal. She does qualify for severe malnutrition d/t recent weight loss and PEG tube malfuction. See MSA note for full details. Cont with current nutrition POC.

## 2024-02-13 ENCOUNTER — APPOINTMENT (OUTPATIENT)
Dept: NUCLEAR MEDICINE | Facility: HOSPITAL | Age: 49
DRG: 698 | End: 2024-02-13
Payer: MEDICARE

## 2024-02-13 LAB
ANION GAP SERPL CALCULATED.3IONS-SCNC: 11 MMOL/L (ref 5–15)
BUN SERPL-MCNC: 31 MG/DL (ref 6–20)
BUN/CREAT SERPL: 75.6 (ref 7–25)
CALCIUM SPEC-SCNC: 10 MG/DL (ref 8.6–10.5)
CHLORIDE SERPL-SCNC: 103 MMOL/L (ref 98–107)
CO2 SERPL-SCNC: 27 MMOL/L (ref 22–29)
CREAT SERPL-MCNC: 0.41 MG/DL (ref 0.57–1)
EGFRCR SERPLBLD CKD-EPI 2021: 123.1 ML/MIN/1.73
GLUCOSE BLDC GLUCOMTR-MCNC: 101 MG/DL (ref 70–130)
GLUCOSE BLDC GLUCOMTR-MCNC: 120 MG/DL (ref 70–130)
GLUCOSE BLDC GLUCOMTR-MCNC: 124 MG/DL (ref 70–130)
GLUCOSE BLDC GLUCOMTR-MCNC: 132 MG/DL (ref 70–130)
GLUCOSE BLDC GLUCOMTR-MCNC: 141 MG/DL (ref 70–130)
GLUCOSE SERPL-MCNC: 127 MG/DL (ref 65–99)
POTASSIUM SERPL-SCNC: 4 MMOL/L (ref 3.5–5.2)
SODIUM SERPL-SCNC: 141 MMOL/L (ref 136–145)

## 2024-02-13 PROCEDURE — 78708 K FLOW/FUNCT IMAGE W/DRUG: CPT

## 2024-02-13 PROCEDURE — 0 TECHNETIUM MERTIATIDE: Performed by: FAMILY MEDICINE

## 2024-02-13 PROCEDURE — A9562 TC99M MERTIATIDE: HCPCS | Performed by: FAMILY MEDICINE

## 2024-02-13 PROCEDURE — 82948 REAGENT STRIP/BLOOD GLUCOSE: CPT

## 2024-02-13 PROCEDURE — 99232 SBSQ HOSP IP/OBS MODERATE 35: CPT | Performed by: CLINICAL NURSE SPECIALIST

## 2024-02-13 PROCEDURE — 94799 UNLISTED PULMONARY SVC/PX: CPT

## 2024-02-13 PROCEDURE — 25010000002 HEPARIN (PORCINE) PER 1000 UNITS: Performed by: FAMILY MEDICINE

## 2024-02-13 PROCEDURE — 80048 BASIC METABOLIC PNL TOTAL CA: CPT | Performed by: FAMILY MEDICINE

## 2024-02-13 PROCEDURE — 25010000002 FUROSEMIDE PER 20 MG: Performed by: FAMILY MEDICINE

## 2024-02-13 PROCEDURE — 99231 SBSQ HOSP IP/OBS SF/LOW 25: CPT | Performed by: INTERNAL MEDICINE

## 2024-02-13 RX ORDER — FUROSEMIDE 10 MG/ML
40 INJECTION INTRAMUSCULAR; INTRAVENOUS
Status: COMPLETED | OUTPATIENT
Start: 2024-02-13 | End: 2024-02-13

## 2024-02-13 RX ADMIN — HEPARIN SODIUM 5000 UNITS: 5000 INJECTION INTRAVENOUS; SUBCUTANEOUS at 10:49

## 2024-02-13 RX ADMIN — Medication 10 ML: at 10:49

## 2024-02-13 RX ADMIN — FUROSEMIDE 40 MG: 10 INJECTION, SOLUTION INTRAVENOUS at 12:05

## 2024-02-13 RX ADMIN — BACLOFEN 10 MG: 10 TABLET ORAL at 21:11

## 2024-02-13 RX ADMIN — FAMOTIDINE 20 MG: 10 INJECTION INTRAVENOUS at 10:50

## 2024-02-13 RX ADMIN — Medication 250 MG: at 10:49

## 2024-02-13 RX ADMIN — BACLOFEN 10 MG: 10 TABLET ORAL at 02:53

## 2024-02-13 RX ADMIN — FAMOTIDINE 20 MG: 10 INJECTION INTRAVENOUS at 21:12

## 2024-02-13 RX ADMIN — Medication 10 ML: at 21:12

## 2024-02-13 RX ADMIN — HEPARIN SODIUM 5000 UNITS: 5000 INJECTION INTRAVENOUS; SUBCUTANEOUS at 21:12

## 2024-02-13 RX ADMIN — TECHNESCAN TC 99M MERTIATIDE 1 DOSE: 1 INJECTION, POWDER, LYOPHILIZED, FOR SOLUTION INTRAVENOUS at 11:45

## 2024-02-13 RX ADMIN — Medication 1 TABLET: at 10:49

## 2024-02-13 RX ADMIN — BACLOFEN 10 MG: 10 TABLET ORAL at 10:49

## 2024-02-13 RX ADMIN — BACLOFEN 10 MG: 10 TABLET ORAL at 17:37

## 2024-02-13 NOTE — PROGRESS NOTES
[facility] Medicine Services  INPATIENT PROGRESS NOTE    Patient Name: Namita Zabala  Date of Admission: 2/7/2024  Today's Date: 02/13/24  Length of Stay: 6  Primary Care Physician: David Lara MD    Subjective   Chief Complaint: Fevers  Addendum    Fever        48-year-old female with history of anoxic brain injury, spastic quadriplegia, percutaneous endoscopic gastrostomy tube placement, severe malnutrition, frequent urinary tract infections with secondary sepsis, was admitted to the hospital December 2023 and treated with broad-spectrum antibiotics; history of mitral valve prolapse, nephrolithiasis, with a left percutaneous nephroureteral drain in place, last exchanged documented on January 2023.      Patient came from [facility]; as per family member patient has had a malfunctioning percutaneous gastrostomy tube for several days, and approximately 3 days ago developed fevers.  There is no other information available at this time.  Not able to provide any history due to neurological condition.    She was admitted to intensive care unit.    Discussed with family member yesterday afternoon.      Tracheostomy  On medical floor  No fevers reported  No other events reported  Unable to obtain labs today          Review of Systems   Constitutional:  Positive for fever.      All pertinent negatives and positives are as above. All other systems have been reviewed and are negative unless otherwise stated.     Objective    Temp:  [97.2 °F (36.2 °C)-97.8 °F (36.6 °C)] 97.2 °F (36.2 °C)  Heart Rate:  [] 92  Resp:  [16] 16  BP: ()/(47-58) 86/47  Physical Exam  Constitutional:       Appearance: Chronically ill-appearing.  Multiple contractures.  HENT:      Head: Normocephalic and atraumatic.      Nose: Nose normal.      Mouth/Throat:      Mouth: Mucous membranes are dry.     Pharynx: Able to evaluate  Eyes:      Extraocular Movements: Extraocular  movements preserved     Conjunctiva/sclera: Conjunctivae normal.      Pupils: Pupils are equal, round, and reactive to light.   Cardiovascular:      Rate and Rhythm: Tachycardic, normal rate and regular rhythm.      Pulses: Fast pulse  Pulmonary:      Effort: No tachypnea.     Breath sounds: Reduced breath sounds on the right due to positioning.  Abdominal:      General: Abdominal scar extensive, with no open wounds; there is a percutaneous gastrostomy tube in place.  Abdomen is nondistended.  Bowel sounds are diminished.  Genitourinary:  nephrostomy tube on the left.  Mojica catheter in place.     Palpations: Abdomen is soft.      Tenderness: There is no guarding or rebound.   Musculoskeletal:         General: Multiple upper and lower extremity flexion contractures; decreased range of motion.    Extremities: Multiple contractures as per musculoskeletal exam; no lower extremity edema.  Skin:     Capillary Refill: Capillary refill takes less than 2 seconds.      Coloration: Skin is not jaundiced.      Findings: No rash.   Neurological:      General: Spastic quadriplegia.  I am not able to assess patient's orientation or memory.  Speech:  with aphasia.   Psychiatric evaluation not able to be performed.      Results Review:  I have reviewed the labs, radiology results, and diagnostic studies.    Laboratory Data:   Results from last 7 days   Lab Units 02/11/24  1241 02/10/24  1020 02/09/24  0316   WBC 10*3/mm3 7.99 11.40* 7.59   HEMOGLOBIN g/dL 13.4 10.8* 10.3*   HEMATOCRIT % 41.8 32.6* 34.5   PLATELETS 10*3/mm3 178 168 158        Results from last 7 days   Lab Units 02/11/24  1241 02/10/24  1020 02/09/24  1312 02/07/24  1743 02/07/24  1411   SODIUM mmol/L 141 143 148*   < > 161*   POTASSIUM mmol/L 5.2 4.5 4.0   < > 4.0   CHLORIDE mmol/L 107 111* 115*   < > 115*   CO2 mmol/L 22.0 21.0* 26.0   < > 27.0   BUN mg/dL 16 27* 34*   < > 56*   CREATININE mg/dL 0.45* 0.52* 0.46*   < > 0.97   CALCIUM mg/dL 10.1 9.6 9.7   < > 11.4*    BILIRUBIN mg/dL  --   --   --   --  0.9   ALK PHOS U/L  --   --   --   --  62   ALT (SGPT) U/L  --   --   --   --  13   AST (SGOT) U/L  --   --   --   --  20   GLUCOSE mg/dL 128* 131* 122*   < > 161*    < > = values in this interval not displayed.       Culture Data:   Blood Culture   Date Value Ref Range Status   02/07/2024 Abnormal Stain (C)  Preliminary       Radiology Data:   Imaging Results (Last 24 Hours)       ** No results found for the last 24 hours. **            I have reviewed the patient's current medications.     Assessment/Plan   Assessment  Active Hospital Problems    Diagnosis     History of anoxic brain injury     Infection associated with nephrostomy catheter     Functional quadriplegia     Dysphagia     Severe malnutrition     Complicated UTI (urinary tract infection)      Severe sepsis, resolved  Recurrent urinary tract infection, complicated, associated to nephrostomy tube/indwelling catheter/ureteral stent  Status post replacement of 18 Fr percutaneous endoscopic gastrostomy tube 2/08/24  Hypernatremia, resolved  Mild hypokalemia, resolved  History of anoxic brain injury 2022  Chronic left nephrostomy tube in place  Spastic quadriplegia  History of pulmonary embolism in 2021  History of COVID-19 in 2021             CT scan report  1. A left-sided nephrostomy tube and ureteral stent remains in place and  well-positioned. There is mild progressive distention of the upper  tracts of the left kidney as well as of the proximal and mid left ureter  when compared to the previous exam. There is associated pyeloureteritis  with urothelial thickening and stranding surrounding the left renal  pelvis and proximal ureter. Dysfunction of the indwelling stent is  suspected. The right kidney demonstrates nonobstructing nephrolithiasis.  No right-sided ureteral stone is present.  2. Large volume of stool within the colon. No fecal impaction within the  rectal vault. The stomach is moderately distended with  air and fluid  with an air-fluid level but without definite gastric wall thickening or  convincing evidence of gastric outlet obstruction. No evidence of  mechanical obstruction.  3. Just to the left of midline at the umbilicus there is a small  abdominal wall defect. Slight protrusion of a portion of the transverse  colon into this defect is present without incarceration or obstruction.  4. The uterus and adnexa are unremarkable..     -No fevers in 48 hours  -Urine culture 1/29/24: E. coli ESBL sensitive to ertapenem and meropenem  Proteus mirabilis resistant to quinolones and nitrofurantoin  Pseudomonas resistant to Levaquin.  -Blood culture 2/7/24: staph coag negative x 1, probable contaminant  -Urine culture 2/7/24: mixed oscar      Prior records reviewed: Last nephroureteral exchange performed January 4, 2023 at [facility] interventional radiology.      Treatment Plan      Received meropenem IV, 02/07 to 02/11; antibiotics per ID     S/P percutaneous gastrostomy tube exchange.  Continue enteral feedings, per nutrition recommendations.     Mojica catheter exchanged  2/8/2024.        Given that [place] is at full capacity, I reviewed patient's urologist communication to family member which included a recommendation for a Lasix renal scan.  I ordered test and will be performed today; nuclear Medicine renal flow and function with Lasix administration.    Dr. Rey texted me back today and agrees with plan.  She believes patient may no longer need the nephroureteral stent but will need to see if the ureter looks normal as it had a leak in the past..  An antegrade nephrostogram would be required.  If the ureter is normal, the stent could be removed or capped for safety removal in 1 to 2 weeks.        Heparin subcutaneous for DVT prophylaxis.  Prognosis is poor given functional status.      Medical Decision Making  Number and Complexity of problems: 10, high complexity  Differential Diagnosis: See  above    Conditions and Status        Condition is improving.     [facility] Data  External documents reviewed: None  Cardiac tracing (EKG, telemetry) interpretation: Reviewed, sinus tachycardia  Radiology interpretation: Radiology reports reviewed  Labs reviewed: Yes  Any tests that were considered but not ordered: No     Decision rules/scores evaluated (example [id number], [place], etc): None     Discussed with: Nurse staff and specialists     Care Planning  Shared decision making: With family  Code status and discussions: No chest compressions.    Disposition  Social Determinants of Health that impact treatment or disposition: Nursing home.  Bedbound.  I expect the patient to be transferred for nephroureteral stent exchange.    Electronically signed by Tae Charles MD, 02/13/24, 10:16 CST.         Addendum 4:15 PM    Results of nuclear medicine test reviewed  IMPRESSION:  1. Symmetric flow to the kidneys. Differential function is 53.4%  left-sided and 46.6% right-sided.  2. Normal excretion curve for the right kidney with a half-emptying time  of 12.8 minutes. This is mildly accentuated following the administration  of IV Lasix.  3. Hydronephrotic left kidney with progressive accumulation of activity  within the renal collecting system until the administration of IV Lasix.  Following the administration of 40 mg of Lasix intravenously there is  rapid excretion of activity from the upper tracts of the left kidney  with a half-emptying time of less than 6 minutes.    Electronically signed by Tae Charles MD, 02/13/24, 4:15 PM CST.

## 2024-02-13 NOTE — PLAN OF CARE
Goal Outcome Evaluation:  Plan of Care Reviewed With: patient        Progress: no change  Outcome Evaluation: Patient rested well. VSS. Complete bath given. Mojica care completed. Neph tube and peg tube. Trach care complete. HOB at 35. IV right AC IID. Turn every 2 hours. Safety maintained.

## 2024-02-13 NOTE — CASE MANAGEMENT/SOCIAL WORK
Continued Stay Note   Livia     Patient Name: Namita Zabala  MRN: [id number]  Today's Date: 2/13/2024    Admit Date: 2/7/2024    Plan: [facility]   Discharge Plan       Row Name 02/13/24 1021       Plan    Plan [facility]    Plan Comments Spoke with scheduling at  [phone] to make an appointment for pt. Faxing the records they requested to [phone] for them to review. Updating pt's nurse and the charge nurse.                   Discharge Codes    No documentation.                       ORAL Mcintyre

## 2024-02-13 NOTE — PROGRESS NOTES
[facility] Palliative Care Services    Palliative Care Daily Progress Note   Chief complaint-follow up support for patient/family    Code Status:   Code Status and Medical Interventions:   Ordered at: 24 3687     Level Of Support Discussed With:    Health Care Surrogate     Code Status (Patient has no pulse and is not breathing):    No CPR (Do Not Attempt to Resuscitate)     Medical Interventions (Patient has pulse or is breathing):    Full Support      Advanced Directives: Advance Directive Status: Patient has advance directive, copy in chart   Goals of Care: Ongoing.     S: Medical record reviewed. Events noted.  Underwent successful replacement of PEG tube by Dr. Ramos on .  Infectious disease following.  Anticipating transfer to tertiary facility for ureteral stent exchange soon per attending physician.  Plan for nuclear medicine renal flow and function with Lasix administration to evaluate function and any obstruction/hydronephrosis and to assess need for additional interventions.  Laying in bed without apparent distress with eyes closed.  No family at bedside.      Review of Systems   Unable to perform ROS: Acuity of condition     Pain Assessment  CPOT and PAINAD Scales: PAINAD (Pain Assessment in Advance Dementia Scale)  CPOT Facial Expression: 0-->relaxed, neutral  CPOT Body Movements: 0-->absence of movements  CPOT Muscle Tension: 0-->relaxed  Ventilator Compliance/Vocalization: 0-->talking in normal tone or no sound  CPOT Score: 0  PAINAD Breathin-->normal  PAINAD Negative Vocalization: 0-->none  PAINAD Facial Expression: 0-->smiling or inexpressive  PAINAD Body Language: 0-->relaxed  PAINAD Consolability: 0-->no need to console  PAINAD Score: 0    O:     Intake/Output Summary (Last 24 hours) at 2024 0834  Last data filed at 2024 0008  Gross per 24 hour   Intake 1178 ml   Output 1100 ml   Net 78 ml       Diagnostics and current medications:  Reviewed.      Current Facility-Administered Medications:     acetaminophen (TYLENOL) suppository 325 mg, 325 mg, Rectal, Q4H PRN, Tae Charles MD, 325 mg at 02/08/24 0856    albuterol (PROVENTIL) nebulizer solution 0.083% 2.5 mg/3mL, 2.5 mg, Nebulization, Q4H PRN, Tae Charles MD    artificial tears ophthalmic ointment, , Both Eyes, Q1H PRN, Tae Charles MD    baclofen (LIORESAL) tablet 10 mg, 10 mg, Per G Tube, Q6H, Tae Charles MD, 10 mg at 02/13/24 0253    sennosides-docusate (PERICOLACE) 8.6-50 MG per tablet 2 tablet, 2 tablet, Oral, BID PRN **AND** polyethylene glycol (MIRALAX) packet 17 g, 17 g, Oral, Daily PRN **AND** bisacodyl (DULCOLAX) EC tablet 5 mg, 5 mg, Oral, Daily PRN **AND** bisacodyl (DULCOLAX) suppository 10 mg, 10 mg, Rectal, Daily PRN, Tae Charles MD    famotidine (PEPCID) injection 20 mg, 20 mg, Intravenous, Q12H, Tae Charles MD, 20 mg at 02/12/24 2120    heparin (porcine) 5000 UNIT/ML injection 5,000 Units, 5,000 Units, Subcutaneous, Q12H, Tae Charles MD, 5,000 Units at 02/12/24 2119    hydrOXYzine (ATARAX) tablet 25 mg, 25 mg, Per G Tube, Q6H PRN, Tae Charles MD, 25 mg at 02/12/24 0247    metoprolol tartrate (LOPRESSOR) tablet 50 mg, 50 mg, Per PEG Tube, Q12H, Priscilla Tierney MD, 50 mg at 02/11/24 2047    Morphine sulfate (PF) injection 1 mg, 1 mg, Intravenous, Q4H PRN, Tae Charles MD, 1 mg at 02/12/24 0950    multivitamin with minerals 1 tablet, 1 tablet, Oral, Daily, Tae Charles MD, 1 tablet at 02/12/24 0820    ondansetron (ZOFRAN) injection 4 mg, 4 mg, Intravenous, Q6H PRN, Tae Charles MD    saccharomyces boulardii (FLORASTOR) capsule 250 mg, 250 mg, Per G Tube, Daily, Tae Charles MD, 250 mg at 02/12/24 0820    [COMPLETED] Insert Peripheral IV, , , Once **AND** sodium chloride 0.9 % flush 10 mL, 10 mL, Intravenous, PRN, Dea Lopez MD    sodium chloride 0.9 % flush 10 mL, 10 mL, Intravenous, Q12H,  "Tae Charles MD, 10 mL at 02/12/24 2120    sodium chloride 0.9 % flush 10 mL, 10 mL, Intravenous, PRN, Tae Charles MD    sodium chloride 0.9 % infusion 40 mL, 40 mL, Intravenous, PRN, Tae Charles MD    Allergies   Allergen Reactions    Coconut Unknown - High Severity    Levaquin [Levofloxacin] Other (See Comments)     unknown    Nuts Unknown - High Severity    Penicillins Rash     Patient's father noted patient had taken penicillin, many years ago, many times and then started developing a rash when she would take it.  She has received cefepime, ceftriaxone and cephalexin with no known adverse problems    Turkey Other (See Comments)     Causes migraines per pt reports     I have utilized all available immediate resources to obtain, update, or review the patient's current medications (including all prescriptions, over-the-counter products, herbals, cannabis/cannabidiol products, and vitamin/mineral/dietary (nutritional) supplements) for name, route of administration, type, dose and frequency.    A:    BP 91/58 (BP Location: Right leg, Patient Position: Lying)   Pulse 104   Temp 97.3 °F (36.3 °C) (Oral)   Resp 16   Ht 152.4 cm (60\")   Wt 55.3 kg (121 lb 14.6 oz)   LMP  (LMP Unknown)   SpO2 98%   BMI 23.81 kg/m²     Vitals and nursing note reviewed.   Constitutional:       Appearance: Not in distress. Ill-appearing and chronically ill-appearing.      Comments: Trach collar, tube feeds infusing  Eyes:      General: Lids are normal.   HENT:      Head: Normocephalic.   Neck:      Trachea: Tracheostomy present.   Pulmonary:      Effort: Pulmonary effort is normal.   Cardiovascular:      Normal rate present.   Edema:     Peripheral edema present.  Musculoskeletal:      Comments: Contractures upper and lower extremities   Skin:     General: Skin is warm.   Genitourinary:     Comments: Left-sided nephrostomy tube, Mojica catheter in place  Neurological:      Mental Status: Alert.      Comments: " Nonverbal.  Eyes closed  Psychiatric:         Speech: Noncommunicative.         Cognition and Memory: Cognition is impaired.      Patient status: Disease state: Controlled with current treatments.  Current Functional status: Palliative Performance Scale Score: Performance 10% based on the following measures: Ambulation: Totally bed bound, Activity and Evidence of Disease: Unable to do any work, extensive evidence of disease, Self-Care: Total care required,  Intake: Mouth care only, LOC: Drowsy or comatose   Baseline Functional status: Palliative Performance Scale Score: Performance 10% based on the following measures: Ambulation: Totally bed bound, Activity and Evidence of Disease: Unable to do any work, extensive evidence of disease, Self-Care: Total care required,  Intake: Mouth care only, LOC: Drowsy or comatose   Nutritional status: Albumin 4.9 Body mass index is 23.47 kg/m².      Hospital Problem List      Severe sepsis    Complicated UTI (urinary tract infection)    Severe malnutrition    Functional quadriplegia    Dysphagia    Infection associated with nephrostomy catheter    History of anoxic brain injury    Hypernatremia     Impression/Problem List:     Severe sepsis  Complicated UTI with frequent UTIs  Pyeloureteritis, dysfunction of the indwelling stent is suspected per CT  Chronic respiratory failure with hypoxia  History of anoxic brain injury  Functional quadriplegia  Hypernatremia  History of retroperitoneal hematoma and hematoma with abscess leading to necrotizing infection of the pelvis and bladder rupture s/p nephroureteral stent placement,  Tracheostomy present  Severe malnutrition  Hypertension  Left-sided nephrostomy tube in place  Chronic pulmonary embolism  Iron deficiency anemia  Migraine  Mitral valve prolapse  Dysphagia s/p PEG  Contractures  Debility            Recommendations/Plan:  1. plan: Goals of care include CODE STATUS no CPR/full support interventions.     Family support: The  patient receives support from her .  Advance Directives: No advance directive on file     POA/Healthcare surrogate-spouse, Grant.     2.  Palliative care encounter  - Prognosis is poor long-term secondary to severe sepsis, complicated UTI with frequent UTIs, suspected dysfunction, with indwelling stent, respiratory failure, anoxic brain injury, quadriplegia, debility, and multiple comorbidities.  -Family appears to have poor prognostic awareness.      -Resident of [facility].       -Last seen by this provider during November hospitalization.  A MOST document was completed on 11/27 to include no CPR/limited support interventions, no intubation, no NIPPV.       -Urine and blood cultures pending.  Treated with IV antibiotics and IV fluids.   -Infectious disease, GI, and urology consulted.    -Evaluated by speech therapy and not appropriate for skilled intervention as she does not follow commands and not appropriate for swallow evaluation.     2/8-CODE STATUS clarified.  Will plan to complete a MOST document to include no CPR/full support interventions.    -Spouse wishes to pursue PEG tube exchange and wishes for patient to be transferred [facility] tertiary [facility]    -family not prepared to de-escalate care measures at this juncture and we will continue to provide family support as needed.    2/13-continue supportive measures  -plans to transfer to tertiary facility for ureteral stent exchange versus outpatient follow-up pending nuclear medicine study     Thank you for allowing us to participate in patient's plan of care. Palliative Care Team will continue to follow patient.     Roxane Sahni, APRN  2/13/2024  08:34 CST

## 2024-02-13 NOTE — PROGRESS NOTES
INFECTIOUS DISEASES PROGRESS NOTE    Patient:  Namita Zabala  YOB: 1975  MRN: [id number]   Admit date: 2024   Admitting Physician: Tae Charles MD  Primary Care Physician: David Lara MD    Chief Complaint: Unobtainable as patient nonverbal      Interval History: Patient initially off the floor for a nuclear medicine study.  I came back later when she was back in her room.  Outpatient appointment with  being worked on for exchange of patient's left nephroureteral stent.    Allergies:   Allergies   Allergen Reactions    Coconut Unknown - High Severity    Levaquin [Levofloxacin] Other (See Comments)     unknown    Nuts Unknown - High Severity    Penicillins Rash     Patient's father noted patient had taken penicillin, many years ago, many times and then started developing a rash when she would take it.  She has received cefepime, ceftriaxone and cephalexin with no known adverse problems    Turkey Other (See Comments)     Causes migraines per pt reports       Current Scheduled Medications:   baclofen, 10 mg, Per G Tube, Q6H  famotidine, 20 mg, Intravenous, Q12H  heparin (porcine), 5,000 Units, Subcutaneous, Q12H  metoprolol tartrate, 50 mg, Per PEG Tube, Q12H  multivitamin with minerals, 1 tablet, Oral, Daily  saccharomyces boulardii, 250 mg, Per G Tube, Daily  sodium chloride, 10 mL, Intravenous, Q12H      Current PRN Medications:    acetaminophen    albuterol    artificial tears    senna-docusate sodium **AND** polyethylene glycol **AND** bisacodyl **AND** bisacodyl    hydrOXYzine    Morphine    ondansetron    [COMPLETED] Insert Peripheral IV **AND** sodium chloride    sodium chloride    sodium chloride              Objective     Vital Signs:  Temp (24hrs), Av.5 °F (36.4 °C), Min:97.2 °F (36.2 °C), Max:97.8 °F (36.6 °C)      BP (!) 86/47 (BP Location: Right leg, Patient Position: Lying) Comment: Nurse (Elysia) notified.  Pulse 92   Temp 97.2 °F (36.2 °C) (Axillary)    "Resp 16   Ht 152.4 cm (60\")   Wt 55.3 kg (121 lb 14.6 oz)   LMP  (LMP Unknown)   SpO2 97%   BMI 23.81 kg/m²         Physical Exam:  General: Patient contracted female lying in bed appearing comfortable  Neck contracted and lying on the pillow  Upper extremities contract contracted  Nephrostomy  bag with minimal fairly clear appearing urine    Results Review:    I reviewed the patient's new clinical results.    Lab Results:    CBC:   Lab Results   Lab 02/07/24  1416 02/08/24  0342 02/09/24  0316 02/10/24  1020 02/11/24  1241   WBC 15.14* 10.47 7.59 11.40* 7.99   HEMOGLOBIN 13.3 12.9 10.3* 10.8* 13.4   HEMATOCRIT 42.6 47.9* 34.5 32.6* 41.8   PLATELETS 312 210 158 168 178        AutoDiff:   Lab Results   Lab 02/09/24  0316 02/10/24  1020 02/11/24  1241   NEUTROPHIL % 70.3 82.3* 67.3   LYMPHOCYTE % 21.2 10.9* 21.2   MONOCYTES % 6.3 4.4* 5.8   EOSINOPHIL % 1.1 1.7 4.9   BASOPHIL % 0.7 0.4 0.3   NEUTROS ABS 5.34 9.40* 5.39   LYMPHS ABS 1.61 1.24 1.69   MONOS ABS 0.48 0.50 0.46   EOS ABS 0.08 0.19 0.39   BASOS ABS 0.05 0.04 0.02        Manual Diff:    Lab Results   Lab 02/09/24  0316 02/10/24  1020 02/11/24  1241   NEUTROS ABS 5.34 9.40* 5.39           CMP:   Lab Results   Lab 02/07/24  1411 02/07/24  1743 02/09/24  1312 02/10/24  1020 02/11/24  1241   SODIUM 161*   < > 148* 143 141   POTASSIUM 4.0   < > 4.0 4.5 5.2   CHLORIDE 115*   < > 115* 111* 107   CO2 27.0   < > 26.0 21.0* 22.0   BUN 56*   < > 34* 27* 16   CREATININE 0.97   < > 0.46* 0.52* 0.45*   CALCIUM 11.4*   < > 9.7 9.6 10.1   BILIRUBIN 0.9  --   --   --   --    ALK PHOS 62  --   --   --   --    ALT (SGPT) 13  --   --   --   --    AST (SGOT) 20  --   --   --   --    GLUCOSE 161*   < > 122* 131* 128*    < > = values in this interval not displayed.       Estimated Creatinine Clearance: 121.8 mL/min (A) (by C-G formula based on SCr of 0.45 mg/dL (L)).    Culture Results:    Microbiology Results (last 10 days)       Procedure Component Value - Date/Time    " Urine Culture - Urine, Urine, Catheter [[id number]] Collected: 02/08/24 0910    Lab Status: Final result Specimen: Urine, Catheter Updated: 02/09/24 0925     Urine Culture >100,000 CFU/mL Normal Urogenital Brigida    Narrative:      Colonization of the urinary tract without infection is common. Treatment is discouraged unless the patient is symptomatic, pregnant, or undergoing an invasive urologic procedure.    Miscellaneous Micro Request - Specimen Not Sent, Specimen Not Sent [[id number]] Resulted: 02/08/24 0731    Lab Status: Final result Specimen: Specimen Not Sent Updated: 02/08/24 0731     Extra Tube --    Blood Culture - Blood, Hand, Digit Right [[id number]]  (Abnormal) Collected: 02/07/24 1420    Lab Status: Final result Specimen: Blood from Hand, Digit Right Updated: 02/09/24 0513     Blood Culture Staphylococcus, coagulase negative     Isolated from Pediatric Bottle     Gram Stain Pediatric Bottle Gram positive cocci in clusters    Narrative:      Probable contaminant requires clinical correlation, susceptibility not performed unless requested by physician.      Blood Culture ID, PCR - Blood, Hand, Digit Right [[id number]]  (Abnormal) Collected: 02/07/24 1420    Lab Status: Final result Specimen: Blood from Hand, Digit Right Updated: 02/08/24 1023     BCID, PCR Staph spp, not aureus or lugdunensis. Identification by BCID2 PCR.     BOTTLE TYPE Pediatric Bottle    Blood Culture - Blood, Foot, Right [[id number]]  (Normal) Collected: 02/07/24 1228    Lab Status: Final result Specimen: Blood from Foot, Right Updated: 02/12/24 1300     Blood Culture No growth at 5 days    Urine Culture - Urine, Urine, Catheter [[id number]] Collected: 02/07/24 1218    Lab Status: Final result Specimen: Urine, Catheter Updated: 02/09/24 0846     Urine Culture >100,000 CFU/mL Mixed Brigida Isolated    Narrative:      Specimen contains mixed organisms of questionable pathogenicity suggestive of contamination. If symptoms persist, suggest  recollection.  Colonization of the urinary tract without infection is common. Treatment is discouraged unless the patient is symptomatic, pregnant, or undergoing an invasive urologic procedure.    COVID-19, FLU A/B, RSV PCR 1 HR TAT - Swab, Nasopharynx [[id number]]  (Normal) Collected: 02/07/24 1215    Lab Status: Final result Specimen: Swab from Nasopharynx Updated: 02/07/24 1340     COVID19 Not Detected     Influenza A PCR Not Detected     Influenza B PCR Not Detected     RSV, PCR Not Detected    Narrative:      Fact sheet for providers: https://www.fda.gov/media/673254/download    Fact sheet for patients: https://www.fda.gov/media/339122/download    Test performed by PCR.             01/29/2024 0413 02/08/2024 0731 Urine Culture - Urine, Indwelling Urethral Catheter [[id number]]     (Abnormal)   Urine from Indwelling Urethral Catheter    Edited Result - FINAL Component Value   Urine Culture >100,000 CFU/mL Escherichia coli ESBL Abnormal       Consider infectious disease consult.  Susceptibility results may not correlate to clinical outcomes.    >100,000 CFU/mL Proteus mirabilis Abnormal     >100,000 CFU/mL Pseudomonas aeruginosa Abnormal        Susceptibility     Escherichia coli ESBL Proteus mirabilis Pseudomonas aeruginosa     SANDEEP SANDEEP SANDEEP     Amikacin 4 Susceptible         Amoxicillin + Clavulanate   <=2 Susceptible       Ampicillin   <=2 Susceptible       Ampicillin + Sulbactam   <=2 Susceptible       Cefepime   <=1 Susceptible 8 Susceptible     Ceftazidime   <=1 Susceptible 4 Susceptible     Ceftriaxone   <=1 Susceptible       Ciprofloxacin     1 Intermediate     Ertapenem <=0.5 Susceptible <=0.5 Susceptible (C) 1       Gentamicin >=16 Resistant <=1 Susceptible       Imipenem   2 Intermediate (C) 1 <=0.25 Susceptible (C) 1     Levofloxacin >=8 Resistant 4 Resistant 4 Resistant     Meropenem <=0.25 Susceptible <=0.25 Susceptible (C) 1 <=0.25 Susceptible (C) 1     Nitrofurantoin <=16 Susceptible 128 Resistant        Piperacillin + Tazobactam <=4 Susceptible <=4 Susceptible 8 Susceptible     Tobramycin >=16 Resistant   <=1 Susceptible     Trimethoprim + Sulfamethoxazole >=320 Resistant <=20 Susceptible                    1 Appended report. These results have been appended to a previously final verified report.             Radiology:       Imaging Results (Last 72 Hours)       Procedure Component Value Units Date/Time    NM Renal With Flow & Function With Pharmacological Intervention [[id number]] Resulted: 02/13/24 1155     Updated: 02/13/24 1234                Active Hospital Problems    Diagnosis     History of anoxic brain injury     Infection associated with nephrostomy catheter     Functional quadriplegia     Dysphagia     Severe malnutrition     Complicated UTI (urinary tract infection)        IMPRESSION:  Sepsis on admission that was presumed to be secondary to complicated urinary tract infection in patient with chronic indwelling Mojica catheter and left nephroureteral stent.  Urine culture from January 29, 2024 positive for ESBL E. coli, Proteus mirabilis and Pseudomonas aeruginosa.  It appears patient had been on cephalexin based on nursing home MAR.  Urine culture obtained February 7 from catheter from prior to Mojica catheter change revealed greater than 100,000 mixed oscar.  Patient had Mojica catheter exchanged, new specimen sent and mixed urogenital oscar was also isolated.  Patient had fairly quick improvement of leukocytosis, tachycardia and fever.  She completed short empiric course of meropenem  Left nephroureteral stent overdue for exchange.-Working on outpatient management of this as [facility] unable to take patient in transfer.  Nonobstructing right renal stones  Anoxic brain injury  Presence of trach  Penicillin allergy-updated information received from patient's father that her penicillin reaction was that of a rash many years ago.  Review of records reveal she has received cefepime, ceftriaxone and  cephalexin here at [facility].  This is updated in allergy section        RECOMMENDATION:   Monitor closely off meropenem.  That was started based on history of ESBL E. coli, Pseudomonasand Proteus mirabilis from urine culture January 29 as this patient presented with high fever and leukocytosis and was on Keflex as an outpatient per half-way MAR.  Monitor patient for any recurrent fever, hemodynamic instability, increasing white count and then at that time will need to reevaluate at that time but may need specimen from nephrostomy tube and empiric reinstitution of antibiotics.  Ideally would not get specimen from indwelling catheters but options are limited.  Would expect urology to order periprocedure antibiotics when patient gets her left nephroureteral stent exchanged,therefore no additional antibiotics are being ordered while awaiting that procedure    Discussed with JESUSITA Naidu earlier    Rachael Foy MD  02/13/24  12:42 CST

## 2024-02-14 LAB
ANION GAP SERPL CALCULATED.3IONS-SCNC: 11 MMOL/L (ref 5–15)
BUN SERPL-MCNC: 33 MG/DL (ref 6–20)
BUN/CREAT SERPL: 82.5 (ref 7–25)
CALCIUM SPEC-SCNC: 9.9 MG/DL (ref 8.6–10.5)
CHLORIDE SERPL-SCNC: 103 MMOL/L (ref 98–107)
CO2 SERPL-SCNC: 25 MMOL/L (ref 22–29)
CREAT SERPL-MCNC: 0.4 MG/DL (ref 0.57–1)
DEPRECATED RDW RBC AUTO: 53.4 FL (ref 37–54)
EGFRCR SERPLBLD CKD-EPI 2021: 123.8 ML/MIN/1.73
EOSINOPHIL # BLD MANUAL: 0.4 10*3/MM3 (ref 0–0.4)
EOSINOPHIL NFR BLD MANUAL: 5.1 % (ref 0.3–6.2)
ERYTHROCYTE [DISTWIDTH] IN BLOOD BY AUTOMATED COUNT: 15.1 % (ref 12.3–15.4)
GLUCOSE BLDC GLUCOMTR-MCNC: 139 MG/DL (ref 70–130)
GLUCOSE SERPL-MCNC: 134 MG/DL (ref 65–99)
HCT VFR BLD AUTO: 34.2 % (ref 34–46.6)
HGB BLD-MCNC: 10.9 G/DL (ref 12–15.9)
LYMPHOCYTES # BLD MANUAL: 1.49 10*3/MM3 (ref 0.7–3.1)
LYMPHOCYTES NFR BLD MANUAL: 9.1 % (ref 5–12)
MCH RBC QN AUTO: 31.1 PG (ref 26.6–33)
MCHC RBC AUTO-ENTMCNC: 31.9 G/DL (ref 31.5–35.7)
MCV RBC AUTO: 97.7 FL (ref 79–97)
MONOCYTES # BLD: 0.71 10*3/MM3 (ref 0.1–0.9)
NEUTROPHILS # BLD AUTO: 5.19 10*3/MM3 (ref 1.7–7)
NEUTROPHILS NFR BLD MANUAL: 66.7 % (ref 42.7–76)
NEUTS VAC BLD QL SMEAR: ABNORMAL
PLAT MORPH BLD: NORMAL
PLATELET # BLD AUTO: 196 10*3/MM3 (ref 140–450)
PMV BLD AUTO: 12.4 FL (ref 6–12)
POLYCHROMASIA BLD QL SMEAR: ABNORMAL
POTASSIUM SERPL-SCNC: 3.9 MMOL/L (ref 3.5–5.2)
RBC # BLD AUTO: 3.5 10*6/MM3 (ref 3.77–5.28)
SODIUM SERPL-SCNC: 139 MMOL/L (ref 136–145)
VARIANT LYMPHS NFR BLD MANUAL: 1 % (ref 0–5)
VARIANT LYMPHS NFR BLD MANUAL: 18.2 % (ref 19.6–45.3)
WBC NRBC COR # BLD AUTO: 7.78 10*3/MM3 (ref 3.4–10.8)

## 2024-02-14 PROCEDURE — 99231 SBSQ HOSP IP/OBS SF/LOW 25: CPT | Performed by: INTERNAL MEDICINE

## 2024-02-14 PROCEDURE — 25010000002 HEPARIN (PORCINE) PER 1000 UNITS: Performed by: FAMILY MEDICINE

## 2024-02-14 PROCEDURE — 80048 BASIC METABOLIC PNL TOTAL CA: CPT | Performed by: FAMILY MEDICINE

## 2024-02-14 PROCEDURE — 82948 REAGENT STRIP/BLOOD GLUCOSE: CPT

## 2024-02-14 PROCEDURE — 85007 BL SMEAR W/DIFF WBC COUNT: CPT | Performed by: FAMILY MEDICINE

## 2024-02-14 PROCEDURE — 94799 UNLISTED PULMONARY SVC/PX: CPT

## 2024-02-14 PROCEDURE — 85025 COMPLETE CBC W/AUTO DIFF WBC: CPT | Performed by: FAMILY MEDICINE

## 2024-02-14 RX ORDER — SACCHAROMYCES BOULARDII 250 MG
250 CAPSULE ORAL DAILY
Qty: 30 CAPSULE | Refills: 0 | Status: CANCELLED | OUTPATIENT
Start: 2024-02-15 | End: 2024-03-16

## 2024-02-14 RX ADMIN — BACLOFEN 10 MG: 10 TABLET ORAL at 02:52

## 2024-02-14 RX ADMIN — HYDROXYZINE HYDROCHLORIDE 25 MG: 25 TABLET ORAL at 21:56

## 2024-02-14 RX ADMIN — FAMOTIDINE 20 MG: 10 INJECTION INTRAVENOUS at 08:08

## 2024-02-14 RX ADMIN — HEPARIN SODIUM 5000 UNITS: 5000 INJECTION INTRAVENOUS; SUBCUTANEOUS at 08:08

## 2024-02-14 RX ADMIN — Medication 1 TABLET: at 08:08

## 2024-02-14 RX ADMIN — BACLOFEN 10 MG: 10 TABLET ORAL at 16:35

## 2024-02-14 RX ADMIN — BACLOFEN 10 MG: 10 TABLET ORAL at 21:56

## 2024-02-14 RX ADMIN — Medication 10 ML: at 21:56

## 2024-02-14 RX ADMIN — Medication 250 MG: at 08:08

## 2024-02-14 RX ADMIN — FAMOTIDINE 20 MG: 10 INJECTION INTRAVENOUS at 21:56

## 2024-02-14 RX ADMIN — Medication 10 ML: at 08:09

## 2024-02-14 RX ADMIN — METOPROLOL TARTRATE 50 MG: 50 TABLET, FILM COATED ORAL at 08:08

## 2024-02-14 RX ADMIN — BACLOFEN 10 MG: 10 TABLET ORAL at 08:09

## 2024-02-14 RX ADMIN — HEPARIN SODIUM 5000 UNITS: 5000 INJECTION INTRAVENOUS; SUBCUTANEOUS at 21:56

## 2024-02-14 NOTE — PLAN OF CARE
Goal Outcome Evaluation:           Progress: no change  Outcome Evaluation: Trach. Trach collar on delivering O2 at 8L and 30%. Pt resting comfortably between care; no signs of pain/discomfort. IID. Turn Q2. TF at 45 continuous per pump; 35 flush Q1H. HOB remains at 35 degrees. Neph tube to L. Voiding per [place]. No BM this shift. Nuclear med scan today; see results. Heparin for VTE prevention. See  note about [facility] appt. Safety maintained.

## 2024-02-14 NOTE — CASE MANAGEMENT/SOCIAL WORK
Continued Stay Note   [place]     Patient Name: Namita Zabala  MRN: [id number]  Today's Date: 2/14/2024    Admit Date: 2/7/2024    Plan: [facility]   Discharge Plan       Row Name 02/14/24 1410       Plan    Plan [facility]    Plan Comments [facility] has contact [facility] for IR appointment and waiting for a call back.                   Discharge Codes    No documentation.                       ORAL Mcintyre

## 2024-02-14 NOTE — PROGRESS NOTES
"INFECTIOUS DISEASES PROGRESS NOTE    Patient:  Namita Zabala  YOB: 1975  MRN: [id number]   Admit date: 2024   Admitting Physician: Tae Charles MD  Primary Care Physician: David Lara MD    Chief Complaint: Unobtainable as patient nonverbal      Interval History: Appears now outpatient appointment for nephro ureteral stent is being arranged with [facility] instead of [facility].      No fever reported  Allergies:   Allergies   Allergen Reactions    Coconut Unknown - High Severity    Levaquin [Levofloxacin] Other (See Comments)     unknown    Nuts Unknown - High Severity    Penicillins Rash     Patient's father noted patient had taken penicillin, many years ago, many times and then started developing a rash when she would take it.  She has received cefepime, ceftriaxone and cephalexin with no known adverse problems    Turkey Other (See Comments)     Causes migraines per pt reports       Current Scheduled Medications:   baclofen, 10 mg, Per G Tube, Q6H  famotidine, 20 mg, Intravenous, Q12H  heparin (porcine), 5,000 Units, Subcutaneous, Q12H  metoprolol tartrate, 50 mg, Per PEG Tube, Q12H  multivitamin with minerals, 1 tablet, Oral, Daily  saccharomyces boulardii, 250 mg, Per G Tube, Daily  sodium chloride, 10 mL, Intravenous, Q12H      Current PRN Medications:    acetaminophen    albuterol    artificial tears    senna-docusate sodium **AND** polyethylene glycol **AND** bisacodyl **AND** bisacodyl    hydrOXYzine    Morphine    ondansetron    [COMPLETED] Insert Peripheral IV **AND** sodium chloride    sodium chloride    sodium chloride              Objective     Vital Signs:  Temp (24hrs), Av.3 °F (36.8 °C), Min:97.8 °F (36.6 °C), Max:98.9 °F (37.2 °C)      /52 (BP Location: Right leg, Patient Position: Lying)   Pulse 88   Temp 98 °F (36.7 °C) (Axillary)   Resp 16   Ht 152.4 cm (60\")   Wt 54.6 kg (120 lb 5.9 oz)   LMP  (LMP Unknown)   SpO2 98%   " BMI 23.51 kg/m²         Physical Exam:  General: Patient is contracted lying in bed appearing comfortable  Respiratory: Effort even and unlabored        Results Review:    I reviewed the patient's new clinical results.    Lab Results:    CBC:   Lab Results   Lab 02/08/24 0342 02/09/24  0316 02/10/24  1020 02/11/24  1241 02/14/24  0951   WBC 10.47 7.59 11.40* 7.99 7.78   HEMOGLOBIN 12.9 10.3* 10.8* 13.4 10.9*   HEMATOCRIT 47.9* 34.5 32.6* 41.8 34.2   PLATELETS 210 158 168 178 196        AutoDiff:   Lab Results   Lab 02/09/24  0316 02/10/24  1020 02/11/24  1241 02/14/24  0951   NEUTROPHIL % 70.3 82.3* 67.3  --    LYMPHOCYTE % 21.2 10.9* 21.2  --    MONOCYTES % 6.3 4.4* 5.8  --    EOSINOPHIL % 1.1 1.7 4.9  --    BASOPHIL % 0.7 0.4 0.3  --    NEUTROS ABS 5.34 9.40* 5.39 5.19   LYMPHS ABS 1.61 1.24 1.69  --    MONOS ABS 0.48 0.50 0.46  --    EOS ABS 0.08 0.19 0.39 0.40   BASOS ABS 0.05 0.04 0.02  --         Manual Diff:    Lab Results   Lab 02/10/24  1020 02/11/24  1241 02/14/24  0951   NEUTROPHIL %  --   --  66.7   LYMPHOCYTE %  --   --  18.2*   MONOCYTES %  --   --  9.1   NEUTROS ABS 9.40* 5.39 5.19   LYMPHS ABS  --   --  1.49   PLT MORPH  --   --  Normal           CMP:   Lab Results   Lab 02/11/24  1241 02/13/24  1421 02/14/24  0951   SODIUM 141 141 139   POTASSIUM 5.2 4.0 3.9   CHLORIDE 107 103 103   CO2 22.0 27.0 25.0   BUN 16 31* 33*   CREATININE 0.45* 0.41* 0.40*   CALCIUM 10.1 10.0 9.9   GLUCOSE 128* 127* 134*       Estimated Creatinine Clearance: 136.2 mL/min (A) (by C-G formula based on SCr of 0.4 mg/dL (L)).    Culture Results:    Microbiology Results (last 10 days)       Procedure Component Value - Date/Time    Urine Culture - Urine, Urine, Catheter [[id number]] Collected: 02/08/24 0910    Lab Status: Final result Specimen: Urine, Catheter Updated: 02/09/24 0925     Urine Culture >100,000 CFU/mL Normal Urogenital Brigida    Narrative:      Colonization of the urinary tract without infection is common. Treatment  is discouraged unless the patient is symptomatic, pregnant, or undergoing an invasive urologic procedure.    Miscellaneous Micro Request - Specimen Not Sent, Specimen Not Sent [[id number]] Resulted: 02/08/24 0731    Lab Status: Final result Specimen: Specimen Not Sent Updated: 02/08/24 0731     Extra Tube --    Blood Culture - Blood, Hand, Digit Right [[id number]]  (Abnormal) Collected: 02/07/24 1420    Lab Status: Final result Specimen: Blood from Hand, Digit Right Updated: 02/09/24 0513     Blood Culture Staphylococcus, coagulase negative     Isolated from Pediatric Bottle     Gram Stain Pediatric Bottle Gram positive cocci in clusters    Narrative:      Probable contaminant requires clinical correlation, susceptibility not performed unless requested by physician.      Blood Culture ID, PCR - Blood, Hand, Digit Right [[id number]]  (Abnormal) Collected: 02/07/24 1420    Lab Status: Final result Specimen: Blood from Hand, Digit Right Updated: 02/08/24 1023     BCID, PCR Staph spp, not aureus or lugdunensis. Identification by BCID2 PCR.     BOTTLE TYPE Pediatric Bottle    Blood Culture - Blood, Foot, Right [[id number]]  (Normal) Collected: 02/07/24 1228    Lab Status: Final result Specimen: Blood from Foot, Right Updated: 02/12/24 1300     Blood Culture No growth at 5 days    Urine Culture - Urine, Urine, Catheter [[id number]] Collected: 02/07/24 1218    Lab Status: Final result Specimen: Urine, Catheter Updated: 02/09/24 0846     Urine Culture >100,000 CFU/mL Mixed Brigida Isolated    Narrative:      Specimen contains mixed organisms of questionable pathogenicity suggestive of contamination. If symptoms persist, suggest recollection.  Colonization of the urinary tract without infection is common. Treatment is discouraged unless the patient is symptomatic, pregnant, or undergoing an invasive urologic procedure.    COVID-19, FLU A/B, RSV PCR 1 HR TAT - Swab, Nasopharynx [[id number]]  (Normal) Collected: 02/07/24 1215    Lab  Status: Final result Specimen: Swab from Nasopharynx Updated: 02/07/24 1340     COVID19 Not Detected     Influenza A PCR Not Detected     Influenza B PCR Not Detected     RSV, PCR Not Detected    Narrative:      Fact sheet for providers: https://www.fda.gov/media/481720/download    Fact sheet for patients: https://www.fda.gov/media/434024/download    Test performed by PCR.             01/29/2024 0413 02/08/2024 0731 Urine Culture - Urine, Indwelling Urethral Catheter [[id number]]     (Abnormal)   Urine from Indwelling Urethral Catheter    Edited Result - FINAL Component Value   Urine Culture >100,000 CFU/mL Escherichia coli ESBL Abnormal       Consider infectious disease consult.  Susceptibility results may not correlate to clinical outcomes.    >100,000 CFU/mL Proteus mirabilis Abnormal     >100,000 CFU/mL Pseudomonas aeruginosa Abnormal        Susceptibility     Escherichia coli ESBL Proteus mirabilis Pseudomonas aeruginosa     SANDEEP SANDEEP SANDEEP     Amikacin 4 Susceptible         Amoxicillin + Clavulanate   <=2 Susceptible       Ampicillin   <=2 Susceptible       Ampicillin + Sulbactam   <=2 Susceptible       Cefepime   <=1 Susceptible 8 Susceptible     Ceftazidime   <=1 Susceptible 4 Susceptible     Ceftriaxone   <=1 Susceptible       Ciprofloxacin     1 Intermediate     Ertapenem <=0.5 Susceptible <=0.5 Susceptible (C) 1       Gentamicin >=16 Resistant <=1 Susceptible       Imipenem   2 Intermediate (C) 1 <=0.25 Susceptible (C) 1     Levofloxacin >=8 Resistant 4 Resistant 4 Resistant     Meropenem <=0.25 Susceptible <=0.25 Susceptible (C) 1 <=0.25 Susceptible (C) 1     Nitrofurantoin <=16 Susceptible 128 Resistant       Piperacillin + Tazobactam <=4 Susceptible <=4 Susceptible 8 Susceptible     Tobramycin >=16 Resistant   <=1 Susceptible     Trimethoprim + Sulfamethoxazole >=320 Resistant <=20 Susceptible                    1 Appended report. These results have been appended to a previously final verified report.              Radiology:       Imaging Results (Last 72 Hours)       Procedure Component Value Units Date/Time    NM Renal With Flow & Function With Pharmacological Intervention [[id number]] Collected: 02/13/24 1503     Updated: 02/13/24 1512    Narrative:      EXAMINATION: NM RENAL W FLOW AND FUNCTION W PHARMACOLOGICAL  INTERVENTION-  2/13/2024 3:03 PM     HISTORY: Left-sided hydronephrosis.     FINDINGS: Following intravenous injection of 11.0 mCi of Tc 99m MAG3  flow images are obtained. Split function is 53.4% left side and 46.6%  right-sided. There is symmetric uptake of the radiopharmaceutical on  flow time/activity curves.     Parenchymal phase and excretion phase imaging demonstrates rapid peak  activity within the right kidney of 5 minutes. A half-emptying time of  the right renal collecting system of 12.8 minutes is calculated. This is  mildly accentuated with the intravenous administration of 40 mg of  Lasix.     There is progressive accumulation of activity within the collecting  system of the left kidney on time-activity curves prior to the  administration of IV Lasix. The maximum count rate within the left renal  collecting system is at 22 minutes just after the administration of the  IV Lasix. A half-emptying time of less than 6 minutes was demonstrated  following intravenous administration of Lasix.       Impression:      1. Symmetric flow to the kidneys. Differential function is 53.4%  left-sided and 46.6% right-sided.  2. Normal excretion curve for the right kidney with a half-emptying time  of 12.8 minutes. This is mildly accentuated following the administration  of IV Lasix.  3. Hydronephrotic left kidney with progressive accumulation of activity  within the renal collecting system until the administration of IV Lasix.  Following the administration of 40 mg of Lasix intravenously there is  rapid excretion of activity from the upper tracts of the left kidney  with a half-emptying time of less than 6  minutes.     This report was signed and finalized on 2/13/2024 3:09 PM by Dr. Yuniel De Jesus MD.                   Active Hospital Problems    Diagnosis     History of anoxic brain injury     Infection associated with nephrostomy catheter     Functional quadriplegia     Dysphagia     Severe malnutrition     Complicated UTI (urinary tract infection)        IMPRESSION:  Sepsis on admission that was presumed to be secondary to complicated urinary tract infection in patient with chronic indwelling Mojica catheter and left nephroureteral stent.  Urine culture from January 29, 2024 positive for ESBL E. coli, Proteus mirabilis and Pseudomonas aeruginosa. Patient had received ceftriaxone from the emergency department and was discharged back to nursing home on cephalexin.  Based on MAR from the nursing home upon arrival, it appears she remained on cephalexin.  There is documentation that culture results were faxed so antibiotics can be changed. Urine culture obtained February 7 from catheter from prior to Mojica catheter change revealed greater than 100,000 mixed oscar.  Patient had Mojica catheter exchanged, new specimen sent and mixed urogenital oscar was also isolated.  Patient responded fairly quickly to institution of meropenem from the standpoint of leukocytosis, tachycardia and fever.  She completed short empiric course of meropenem.  She has remained afebrile without spike in white count.  Left nephroureteral stent overdue for exchange.-Per JOSE Salazar note.  Now working on outpatient [facility], [place], operated by [facility] appointment  Nonobstructing right renal stones  Anoxic brain injury  Presence of trach  Penicillin allergy-updated information received from patient's father that her penicillin reaction was that of a rash many years ago.  Review of records reveal she has received cefepime, ceftriaxone and cephalexin here at [facility].  This is updated in allergy section        RECOMMENDATION:   Monitor closely off meropenem.  That  was started based on history of ESBL E. coli, Pseudomonas and Proteus mirabilis from urine culture January 29 as this patient presented with high fever and leukocytosis and was on Keflex as an outpatient per [facility] MAR.  Monitor patient for any recurrent fever, hemodynamic instability, increasing white count and then at that time will need to reevaluate and reculture, etc.  Would expect urology to order periprocedure antibiotics when patient gets her left nephroureteral stent exchanged,therefore no additional antibiotics are being ordered while awaiting that procedure    Factious diseases will sign off.    Rachael Foy MD  02/14/24  16:37 CST

## 2024-02-14 NOTE — PROGRESS NOTES
[facility] Medicine Services  INPATIENT PROGRESS NOTE    Patient Name: Namita Zabala  Date of Admission: 2/7/2024  Today's Date: 02/14/24  Length of Stay: 7  Primary Care Physician: David Lara MD    Subjective   Chief Complaint: Fevers  Addendum    Fever        48-year-old female with history of anoxic brain injury, spastic quadriplegia, percutaneous endoscopic gastrostomy tube placement, severe malnutrition, frequent urinary tract infections with secondary sepsis, was admitted to the hospital December 2023 and treated with broad-spectrum antibiotics; history of mitral valve prolapse, nephrolithiasis, with a left percutaneous nephroureteral drain in place, last exchanged documented on January 2023.      Patient came from [facility]; as per family member patient has had a malfunctioning percutaneous gastrostomy tube for several days, and approximately 3 days ago developed fevers.  There is no other information available at this time.  Not able to provide any history due to neurological condition.    She was admitted to intensive care unit.    Discussed with family member yesterday afternoon.      Tracheostomy  On medical floor  No fevers reported  No other events reported  Unable to obtain labs today          Review of Systems   Constitutional:  Positive for fever.      All pertinent negatives and positives are as above. All other systems have been reviewed and are negative unless otherwise stated.     Objective    Temp:  [97.8 °F (36.6 °C)-98.9 °F (37.2 °C)] 97.8 °F (36.6 °C)  Heart Rate:  [] 102  Resp:  [16-18] 16  BP: ()/(47-65) 110/64  Physical Exam  Constitutional:       Appearance: Chronically ill-appearing.  Multiple contractures.  HENT:      Head: Normocephalic and atraumatic.      Nose: Nose normal.      Mouth/Throat:      Mouth: Mucous membranes are dry.     Pharynx: Able to evaluate  Eyes:      Extraocular Movements:  Extraocular movements preserved     Conjunctiva/sclera: Conjunctivae normal.      Pupils: Pupils are equal, round, and reactive to light.   Cardiovascular:      Rate and Rhythm: Tachycardic, normal rate and regular rhythm.      Pulses: Fast pulse  Pulmonary:      Effort: No tachypnea.     Breath sounds: Reduced breath sounds on the right due to positioning.  Abdominal:      General: Abdominal scar extensive, with no open wounds; there is a percutaneous gastrostomy tube in place.  Abdomen is nondistended.  Bowel sounds are diminished.  Genitourinary:  nephrostomy tube on the left.  Mojica catheter in place.     Palpations: Abdomen is soft.      Tenderness: There is no guarding or rebound.   Musculoskeletal:         General: Multiple upper and lower extremity flexion contractures; decreased range of motion.    Extremities: Multiple contractures as per musculoskeletal exam; no lower extremity edema.  Skin:     Capillary Refill: Capillary refill takes less than 2 seconds.      Coloration: Skin is not jaundiced.      Findings: No rash.   Neurological:      General: Spastic quadriplegia.  I am not able to assess patient's orientation or memory.  Speech:  with aphasia.   Psychiatric evaluation not able to be performed.      Results Review:  I have reviewed the labs, radiology results, and diagnostic studies.    Laboratory Data:   Results from last 7 days   Lab Units 02/11/24  1241 02/10/24  1020 02/09/24  0316   WBC 10*3/mm3 7.99 11.40* 7.59   HEMOGLOBIN g/dL 13.4 10.8* 10.3*   HEMATOCRIT % 41.8 32.6* 34.5   PLATELETS 10*3/mm3 178 168 158        Results from last 7 days   Lab Units 02/13/24  1421 02/11/24  1241 02/10/24  1020 02/07/24  1743 02/07/24  1411   SODIUM mmol/L 141 141 143   < > 161*   POTASSIUM mmol/L 4.0 5.2 4.5   < > 4.0   CHLORIDE mmol/L 103 107 111*   < > 115*   CO2 mmol/L 27.0 22.0 21.0*   < > 27.0   BUN mg/dL 31* 16 27*   < > 56*   CREATININE mg/dL 0.41* 0.45* 0.52*   < > 0.97   CALCIUM mg/dL 10.0 10.1 9.6    < > 11.4*   BILIRUBIN mg/dL  --   --   --   --  0.9   ALK PHOS U/L  --   --   --   --  62   ALT (SGPT) U/L  --   --   --   --  13   AST (SGOT) U/L  --   --   --   --  20   GLUCOSE mg/dL 127* 128* 131*   < > 161*    < > = values in this interval not displayed.       Culture Data:   Blood Culture   Date Value Ref Range Status   02/07/2024 Abnormal Stain (C)  Preliminary       Radiology Data:   Imaging Results (Last 24 Hours)       Procedure Component Value Units Date/Time    NM Renal With Flow & Function With Pharmacological Intervention [[id number]] Collected: 02/13/24 1503     Updated: 02/13/24 1512    Narrative:      EXAMINATION: NM RENAL W FLOW AND FUNCTION W PHARMACOLOGICAL  INTERVENTION-  2/13/2024 3:03 PM     HISTORY: Left-sided hydronephrosis.     FINDINGS: Following intravenous injection of 11.0 mCi of Tc 99m MAG3  flow images are obtained. Split function is 53.4% left side and 46.6%  right-sided. There is symmetric uptake of the radiopharmaceutical on  flow time/activity curves.     Parenchymal phase and excretion phase imaging demonstrates rapid peak  activity within the right kidney of 5 minutes. A half-emptying time of  the right renal collecting system of 12.8 minutes is calculated. This is  mildly accentuated with the intravenous administration of 40 mg of  Lasix.     There is progressive accumulation of activity within the collecting  system of the left kidney on time-activity curves prior to the  administration of IV Lasix. The maximum count rate within the left renal  collecting system is at 22 minutes just after the administration of the  IV Lasix. A half-emptying time of less than 6 minutes was demonstrated  following intravenous administration of Lasix.       Impression:      1. Symmetric flow to the kidneys. Differential function is 53.4%  left-sided and 46.6% right-sided.  2. Normal excretion curve for the right kidney with a half-emptying time  of 12.8 minutes. This is mildly accentuated  following the administration  of IV Lasix.  3. Hydronephrotic left kidney with progressive accumulation of activity  within the renal collecting system until the administration of IV Lasix.  Following the administration of 40 mg of Lasix intravenously there is  rapid excretion of activity from the upper tracts of the left kidney  with a half-emptying time of less than 6 minutes.     This report was signed and finalized on 2/13/2024 3:09 PM by Dr. Yuniel De Jesus MD.               I have reviewed the patient's current medications.     Assessment/Plan   Assessment  Active Hospital Problems    Diagnosis     History of anoxic brain injury     Infection associated with nephrostomy catheter     Functional quadriplegia     Dysphagia     Severe malnutrition     Complicated UTI (urinary tract infection)      Severe sepsis, resolved  Recurrent urinary tract infection, complicated, associated to nephrostomy tube/indwelling catheter/ureteral stent  Status post replacement of 18 Fr percutaneous endoscopic gastrostomy tube 2/08/24  Hypernatremia, resolved  Mild hypokalemia, resolved  History of anoxic brain injury 2022  Chronic left nephrostomy tube in place  Spastic quadriplegia  History of pulmonary embolism in 2021  History of COVID-19 in 2021             CT scan report  1. A left-sided nephrostomy tube and ureteral stent remains in place and  well-positioned. There is mild progressive distention of the upper  tracts of the left kidney as well as of the proximal and mid left ureter  when compared to the previous exam. There is associated pyeloureteritis  with urothelial thickening and stranding surrounding the left renal  pelvis and proximal ureter. Dysfunction of the indwelling stent is  suspected. The right kidney demonstrates nonobstructing nephrolithiasis.  No right-sided ureteral stone is present.  2. Large volume of stool within the colon. No fecal impaction within the  rectal vault. The stomach is moderately distended  with air and fluid  with an air-fluid level but without definite gastric wall thickening or  convincing evidence of gastric outlet obstruction. No evidence of  mechanical obstruction.  3. Just to the left of midline at the umbilicus there is a small  abdominal wall defect. Slight protrusion of a portion of the transverse  colon into this defect is present without incarceration or obstruction.  4. The uterus and adnexa are unremarkable.      Results of nuclear medicine test reviewed  1. Symmetric flow to the kidneys. Differential function is 53.4%  left-sided and 46.6% right-sided.  2. Normal excretion curve for the right kidney with a half-emptying time  of 12.8 minutes. This is mildly accentuated following the administration  of IV Lasix.  3. Hydronephrotic left kidney with progressive accumulation of activity  within the renal collecting system until the administration of IV Lasix.  Following the administration of 40 mg of Lasix intravenously there is  rapid excretion of activity from the upper tracts of the left kidney  with a half-emptying time of less than 6 minutes.       -No fevers  -Urine culture 1/29/24: E. coli ESBL sensitive to ertapenem and meropenem  Proteus mirabilis resistant to quinolones and nitrofurantoin  Pseudomonas resistant to Levaquin.  -Blood culture 2/7/24: staph coag negative x 1, probable contaminant  -Urine culture 2/7/24: mixed oscar    Left nephroureteral tube drainage 150+75 ml, reported    Prior records reviewed: Last nephroureteral exchange performed January 4, 2023 at [facility] interventional radiology.      Treatment Plan    Treated with meropenem IV, 02/07 to 02/11.    S/P percutaneous gastrostomy tube exchange.  Continue enteral feedings, per nutrition recommendations.     Mojica catheter exchanged  2/8/2024.      Dr. Rey texted me back 2/13/24 and agreed with plan.  She believes patient may no longer need the nephroureteral stent but will need to see if the ureter looks  normal as it had a leak in the past..  An antegrade nephrostogram would be required.  If the ureter is normal, the stent could be removed or capped for safety removal in 1 to 2 weeks.  This will be scheduled outpatient; attempt to schedule at [place] or  in process.      Heparin subcutaneous for DVT prophylaxis.  Prognosis is poor given functional status.      Medical Decision Making  Number and Complexity of problems: 10, high complexity  Differential Diagnosis: See above    Conditions and Status        Condition is improving.     MDM Data  External documents reviewed: None  Cardiac tracing (EKG, telemetry) interpretation: Reviewed, sinus tachycardia  Radiology interpretation: Radiology reports reviewed  Labs reviewed: Yes  Any tests that were considered but not ordered: No     Decision rules/scores evaluated (example [id number], Wells, etc): None     Discussed with: Nurse staff and specialists     Care Planning  Shared decision making: With family  Code status and discussions: No chest compressions.    Disposition  Social Determinants of Health that impact treatment or disposition: Nursing home.  Bedbound.  I expect the patient to be transferred for nephroureteral stent exchange.    Electronically signed by Tae Charles MD, 02/14/24, 09:18 CST.         Electronically signed by Tae Charles MD, 02/13/24, 4:15 PM CST.

## 2024-02-15 LAB
GLUCOSE BLDC GLUCOMTR-MCNC: 123 MG/DL (ref 70–130)
GLUCOSE BLDC GLUCOMTR-MCNC: 123 MG/DL (ref 70–130)
GLUCOSE BLDC GLUCOMTR-MCNC: 136 MG/DL (ref 70–130)
GLUCOSE BLDC GLUCOMTR-MCNC: 94 MG/DL (ref 70–130)

## 2024-02-15 PROCEDURE — 82948 REAGENT STRIP/BLOOD GLUCOSE: CPT

## 2024-02-15 PROCEDURE — 25010000002 HEPARIN (PORCINE) PER 1000 UNITS: Performed by: FAMILY MEDICINE

## 2024-02-15 RX ADMIN — BACLOFEN 10 MG: 10 TABLET ORAL at 16:30

## 2024-02-15 RX ADMIN — BACLOFEN 10 MG: 10 TABLET ORAL at 09:30

## 2024-02-15 RX ADMIN — FAMOTIDINE 20 MG: 10 INJECTION INTRAVENOUS at 09:31

## 2024-02-15 RX ADMIN — BACLOFEN 10 MG: 10 TABLET ORAL at 21:54

## 2024-02-15 RX ADMIN — HEPARIN SODIUM 5000 UNITS: 5000 INJECTION INTRAVENOUS; SUBCUTANEOUS at 09:31

## 2024-02-15 RX ADMIN — Medication 250 MG: at 09:31

## 2024-02-15 RX ADMIN — METOPROLOL TARTRATE 50 MG: 50 TABLET, FILM COATED ORAL at 09:30

## 2024-02-15 RX ADMIN — Medication 10 ML: at 09:32

## 2024-02-15 RX ADMIN — Medication 1 TABLET: at 09:30

## 2024-02-15 RX ADMIN — BACLOFEN 10 MG: 10 TABLET ORAL at 04:25

## 2024-02-15 RX ADMIN — HEPARIN SODIUM 5000 UNITS: 5000 INJECTION INTRAVENOUS; SUBCUTANEOUS at 21:54

## 2024-02-15 RX ADMIN — FAMOTIDINE 20 MG: 10 INJECTION INTRAVENOUS at 21:54

## 2024-02-15 RX ADMIN — Medication 10 ML: at 21:54

## 2024-02-15 NOTE — PROGRESS NOTES
[facility] Medicine Services  INPATIENT PROGRESS NOTE    Patient Name: Namita Zabala  Date of Admission: 2/7/2024  Today's Date: 02/15/24  Length of Stay: 8  Primary Care Physician: David Lara MD    Subjective   Chief Complaint: Fevers  Addendum    Fever        48-year-old female with history of anoxic brain injury, spastic quadriplegia, percutaneous endoscopic gastrostomy tube placement, severe malnutrition, frequent urinary tract infections with secondary sepsis, was admitted to the hospital December 2023 and treated with broad-spectrum antibiotics; history of mitral valve prolapse, nephrolithiasis, with a left percutaneous nephroureteral drain in place, last exchanged documented on January 2023.      Patient came from nursing [facility]; as per family member patient has had a malfunctioning percutaneous gastrostomy tube for several days, and approximately 3 days ago developed fevers.  There is no other information available at this time.  Not able to provide any history due to neurological condition.    Tracheostomy  On medical floor  No fevers reported  No other events reported  Unable to obtain labs today  2/15  history of anoxic brain injury, spastic quadriplegia, percutaneous endoscopic gastrostomy tube placement, severe malnutrition, frequent urinary tract infections with secondary sepsis, was admitted to the hospital December 2023 and treated with broad-spectrum antibiotics; history of mitral valve prolapse, nephrolithiasis, with a left percutaneous nephroureteral drain in place, last exchanged documented on January 2023.      Patient came from nursing [facility]; as per family member patient has had a malfunctioning percutaneous gastrostomy tube for several days, and approximately 3 days ago developed fevers.   Initiated infectious disease patient was admitted with sepsis assumed from complicated UTI with chronic indwelling Mojica catheter  and left nephroureteral stent. Left nephroureteral stent overdue for exchange.-Per JOSE Salazar W note.  Now working on outpatient [facility] patient does have a history of ESBL Pseudomonas and Proteus infection she was taken off meropenem Per ID      Review of Systems   Constitutional:  Positive for fever.      All pertinent negatives and positives are as above. All other systems have been reviewed and are negative unless otherwise stated.     Objective    Temp:  [97.6 °F (36.4 °C)-98.6 °F (37 °C)] 97.8 °F (36.6 °C)  Heart Rate:  [] 87  Resp:  [14-16] 16  BP: ()/(52-69) 109/69  Physical Exam  Constitutional:       Appearance: Chronically ill-appearing.  Multiple contractures.  HENT:      Head: Normocephalic and atraumatic.      Nose: Nose normal.      Mouth/Throat:      Mouth: Mucous membranes are dry.     Pharynx: Able to evaluate  Eyes:      Extraocular Movements: Extraocular movements preserved     Conjunctiva/sclera: Conjunctivae normal.      Pupils: Pupils are equal, round, and reactive to light.   Cardiovascular:      Rate and Rhythm: Tachycardic, normal rate and regular rhythm.      Pulses: Fast pulse  Pulmonary:      Effort: No tachypnea.     Breath sounds: Reduced breath sounds on the right due to positioning.  Abdominal:      General: Abdominal scar extensive, with no open wounds; there is a percutaneous gastrostomy tube in place.  Abdomen is nondistended.  Bowel sounds are diminished.  Genitourinary:  nephrostomy tube on the left.  Mojica catheter in place.     Palpations: Abdomen is soft.      Tenderness: There is no guarding or rebound.   Musculoskeletal:         General: Multiple upper and lower extremity flexion contractures; decreased range of motion.    Extremities: Multiple contractures as per musculoskeletal exam; no lower extremity edema.  Skin:     Capillary Refill: Capillary refill takes less than 2 seconds.      Coloration: Skin is not jaundiced.      Findings: No  rash.   Neurological:      General: Spastic quadriplegia.  I am not able to assess patient's orientation or memory.  Speech:  with aphasia.   Psychiatric evaluation not able to be performed.      Results Review:  I have reviewed the labs, radiology results, and diagnostic studies.    Laboratory Data:   Results from last 7 days   Lab Units 02/14/24  0951 02/11/24  1241 02/10/24  1020   WBC 10*3/mm3 7.78 7.99 11.40*   HEMOGLOBIN g/dL 10.9* 13.4 10.8*   HEMATOCRIT % 34.2 41.8 32.6*   PLATELETS 10*3/mm3 196 178 168        Results from last 7 days   Lab Units 02/14/24  0951 02/13/24  1421 02/11/24  1241   SODIUM mmol/L 139 141 141   POTASSIUM mmol/L 3.9 4.0 5.2   CHLORIDE mmol/L 103 103 107   CO2 mmol/L 25.0 27.0 22.0   BUN mg/dL 33* 31* 16   CREATININE mg/dL 0.40* 0.41* 0.45*   CALCIUM mg/dL 9.9 10.0 10.1   GLUCOSE mg/dL 134* 127* 128*       Culture Data:   Blood Culture   Date Value Ref Range Status   02/07/2024 Abnormal Stain (C)  Preliminary       Radiology Data:   Imaging Results (Last 24 Hours)       ** No results found for the last 24 hours. **            I have reviewed the patient's current medications.     Assessment/Plan   Assessment  Active Hospital Problems    Diagnosis     History of anoxic brain injury     Infection associated with nephrostomy catheter     Functional quadriplegia     Dysphagia     Severe malnutrition     Complicated UTI (urinary tract infection)      Severe sepsis, resolved  Recurrent urinary tract infection, complicated, associated to nephrostomy tube/indwelling catheter/ureteral stent  Status post replacement of 18 Fr percutaneous endoscopic gastrostomy tube 2/08/24  Hypernatremia, resolved  Mild hypokalemia, resolved  History of anoxic brain injury 2022  Chronic left nephrostomy tube in place  Spastic quadriplegia  History of pulmonary embolism in 2021  History of COVID-19 in 2021             CT scan report  1. A left-sided nephrostomy tube and ureteral stent remains in place  and  well-positioned. There is mild progressive distention of the upper  tracts of the left kidney as well as of the proximal and mid left ureter  when compared to the previous exam. There is associated pyeloureteritis  with urothelial thickening and stranding surrounding the left renal  pelvis and proximal ureter. Dysfunction of the indwelling stent is  suspected. The right kidney demonstrates nonobstructing nephrolithiasis.  No right-sided ureteral stone is present.  2. Large volume of stool within the colon. No fecal impaction within the  rectal vault. The stomach is moderately distended with air and fluid  with an air-fluid level but without definite gastric wall thickening or  convincing evidence of gastric outlet obstruction. No evidence of  mechanical obstruction.  3. Just to the left of midline at the umbilicus there is a small  abdominal wall defect. Slight protrusion of a portion of the transverse  colon into this defect is present without incarceration or obstruction.  4. The uterus and adnexa are unremarkable.      Results of nuclear medicine test reviewed  1. Symmetric flow to the kidneys. Differential function is 53.4%  left-sided and 46.6% right-sided.  2. Normal excretion curve for the right kidney with a half-emptying time  of 12.8 minutes. This is mildly accentuated following the administration  of IV Lasix.  3. Hydronephrotic left kidney with progressive accumulation of activity  within the renal collecting system until the administration of IV Lasix.  Following the administration of 40 mg of Lasix intravenously there is  rapid excretion of activity from the upper tracts of the left kidney  with a half-emptying time of less than 6 minutes.       -No fevers  -Urine culture 1/29/24: E. coli ESBL sensitive to ertapenem and meropenem  Proteus mirabilis resistant to quinolones and nitrofurantoin  Pseudomonas resistant to Levaquin.  -Blood culture 2/7/24: staph coag negative x 1, probable  contaminant  -Urine culture 2/7/24: mixed oscar    Left nephroureteral tube drainage 150+75 ml, reported    Prior records reviewed: Last nephroureteral exchange performed January 4, 2023 at [facility] interventional radiology.      Treatment Plan    Treated with meropenem IV, 02/07 to 02/11.    S/P percutaneous gastrostomy tube exchange.  Continue enteral feedings, per nutrition recommendations.     Mojica catheter exchanged  2/8/2024.      Dr. Rey texted me back 2/13/24 and agreed with plan.  She believes patient may no longer need the nephroureteral stent but will need to see if the ureter looks normal as it had a leak in the past..  An antegrade nephrostogram would be required.  If the ureter is normal, the stent could be removed or capped for safety removal in 1 to 2 weeks.  This will be scheduled outpatient; attempt to schedule at [place] or  in process.      Heparin subcutaneous for DVT prophylaxis.  Prognosis is poor given functional status.      Medical Decision Making  Number and Complexity of problems: 10, high complexity  Differential Diagnosis: See above    Conditions and Status        Condition is improving.     MDM Data  External documents reviewed: None  Cardiac tracing (EKG, telemetry) interpretation: Reviewed, sinus tachycardia  Radiology interpretation: Radiology reports reviewed  Labs reviewed: Yes  Any tests that were considered but not ordered: No     Decision rules/scores evaluated (example [id number], Wells, etc): None     Discussed with: Nurse staff and specialists     Care Planning  Shared decision making: With family  Code status and discussions: No chest compressions.    Disposition  Social Determinants of Health that impact treatment or disposition: Nursing home.  Bedbound.  I expect the patient to be transferred for nephroureteral stent exchange.    Electronically signed by Farhat Chanel MD, 02/15/24, 09:23 CST.         Electronically signed by Tae Charles MD,  02/13/24, 4:15 PM CST.

## 2024-02-15 NOTE — PLAN OF CARE
Goal Outcome Evaluation:              Outcome Evaluation: CARLI orientation; patient nonverbal and contracted. No signs of pain or discomfort. IV IID. Trach. PEG; Peptamen 1.5 infusing at 45mL/hr. L neph tube; draining. Mojica catheter to bedside. Turning Q2. Patient appears to be resting comfortably. Heparin for VTE. Safety maintained.

## 2024-02-15 NOTE — PROGRESS NOTES
[facility] Palliative Care Services    Palliative Care Daily Progress Note   Chief complaint-chart review    Code Status:   Code Status and Medical Interventions:   Ordered at: 24 1950     Level Of Support Discussed With:    Health Care Surrogate     Code Status (Patient has no pulse and is not breathing):    No CPR (Do Not Attempt to Resuscitate)     Medical Interventions (Patient has pulse or is breathing):    Full Support      Advanced Directives: Advance Directive Status: Patient has advance directive, copy in chart   Goals of Care: Ongoing.     S: Medical record reviewed. Events noted.  Underwent successful replacement of PEG tube by Dr. Ramos on .  Infectious disease has signed off.   Nuclear medicine renal flow and function with Lasix administration completed ,  attempting to get outpatient follow-up for tertiary facility based on findings.  Anticipating discharge back to nursing facility when medically appropriate.        Pain Assessment  CPOT and PAINAD Scales: PAINAD (Pain Assessment in Advance Dementia Scale)  CPOT Facial Expression: 0-->relaxed, neutral  CPOT Body Movements: 0-->absence of movements  CPOT Muscle Tension: 0-->relaxed  Ventilator Compliance/Vocalization: 0-->talking in normal tone or no sound  CPOT Score: 0  PAINAD Breathin-->normal  PAINAD Negative Vocalization: 0-->none  PAINAD Facial Expression: 0-->smiling or inexpressive  PAINAD Body Language: 0-->relaxed  PAINAD Consolability: 0-->no need to console  PAINAD Score: 0    O:     Intake/Output Summary (Last 24 hours) at 2/15/2024 1600  Last data filed at 2/15/2024 0600  Gross per 24 hour   Intake 869 ml   Output 850 ml   Net 19 ml       Diagnostics and current medications: Reviewed.      Current Facility-Administered Medications:     acetaminophen (TYLENOL) suppository 325 mg, 325 mg, Rectal, Q4H PRN, Tae Charles MD, 325 mg at 24 0856    albuterol (PROVENTIL) nebulizer  solution 0.083% 2.5 mg/3mL, 2.5 mg, Nebulization, Q4H PRN, Tae Charles MD    artificial tears ophthalmic ointment, , Both Eyes, Q1H PRN, Tae Charles MD    baclofen (LIORESAL) tablet 10 mg, 10 mg, Per G Tube, Q6H, Tae Charles MD, 10 mg at 02/15/24 0930    sennosides-docusate (PERICOLACE) 8.6-50 MG per tablet 2 tablet, 2 tablet, Oral, BID PRN **AND** polyethylene glycol (MIRALAX) packet 17 g, 17 g, Oral, Daily PRN **AND** bisacodyl (DULCOLAX) EC tablet 5 mg, 5 mg, Oral, Daily PRN **AND** bisacodyl (DULCOLAX) suppository 10 mg, 10 mg, Rectal, Daily PRN, Tae Charles MD    famotidine (PEPCID) injection 20 mg, 20 mg, Intravenous, Q12H, Tae Charles MD, 20 mg at 02/15/24 0931    heparin (porcine) 5000 UNIT/ML injection 5,000 Units, 5,000 Units, Subcutaneous, Q12H, Tae Charles MD, 5,000 Units at 02/15/24 0931    hydrOXYzine (ATARAX) tablet 25 mg, 25 mg, Per G Tube, Q6H PRN, Tae Charles MD, 25 mg at 02/14/24 2156    metoprolol tartrate (LOPRESSOR) tablet 50 mg, 50 mg, Per PEG Tube, Q12H, Priscilla Tierney MD, 50 mg at 02/15/24 0930    Morphine sulfate (PF) injection 1 mg, 1 mg, Intravenous, Q4H PRN, Tae Charles MD, 1 mg at 02/12/24 0950    multivitamin with minerals 1 tablet, 1 tablet, Oral, Daily, Tae Charles MD, 1 tablet at 02/15/24 0930    ondansetron (ZOFRAN) injection 4 mg, 4 mg, Intravenous, Q6H PRN, Tae Charles MD    saccharomyces boulardii (FLORASTOR) capsule 250 mg, 250 mg, Per G Tube, Daily, Tae Charles MD, 250 mg at 02/15/24 0931    [COMPLETED] Insert Peripheral IV, , , Once **AND** sodium chloride 0.9 % flush 10 mL, 10 mL, Intravenous, PRN, Dea Lopez MD    sodium chloride 0.9 % flush 10 mL, 10 mL, Intravenous, Q12H, Tae Charles MD, 10 mL at 02/15/24 0932    sodium chloride 0.9 % flush 10 mL, 10 mL, Intravenous, PRN, Tae Charles MD    sodium chloride 0.9 % infusion 40 mL, 40 mL, Intravenous, PRN, Tae Charles,  "MD    Allergies   Allergen Reactions    Coconut Unknown - High Severity    Levaquin [Levofloxacin] Other (See Comments)     unknown    Nuts Unknown - High Severity    Penicillins Rash     Patient's father noted patient had taken penicillin, many years ago, many times and then started developing a rash when she would take it.  She has received cefepime, ceftriaxone and cephalexin with no known adverse problems    Turkey Other (See Comments)     Causes migraines per pt reports     I have utilized all available immediate resources to obtain, update, or review the patient's current medications (including all prescriptions, over-the-counter products, herbals, cannabis/cannabidiol products, and vitamin/mineral/dietary (nutritional) supplements) for name, route of administration, type, dose and frequency.    A:    /69 (BP Location: Right leg, Patient Position: Lying)   Pulse 87   Temp 97.8 °F (36.6 °C) (Oral)   Resp 16   Ht 152.4 cm (60\")   Wt 54.6 kg (120 lb 5.9 oz)   LMP  (LMP Unknown)   SpO2 100%   BMI 23.51 kg/m²      Patient status: Disease state: Controlled with current treatments.  Current Functional status: Palliative Performance Scale Score: Performance 10% based on the following measures: Ambulation: Totally bed bound, Activity and Evidence of Disease: Unable to do any work, extensive evidence of disease, Self-Care: Total care required,  Intake: Mouth care only, LOC: Drowsy or comatose   Baseline Functional status: Palliative Performance Scale Score: Performance 10% based on the following measures: Ambulation: Totally bed bound, Activity and Evidence of Disease: Unable to do any work, extensive evidence of disease, Self-Care: Total care required,  Intake: Mouth care only, LOC: Drowsy or comatose   Nutritional status: Albumin 4.9 Body mass index is 23.47 kg/m².      Hospital Problem List      Severe sepsis    Complicated UTI (urinary tract infection)    Severe malnutrition    Functional " quadriplegia    Dysphagia    Infection associated with nephrostomy catheter    History of anoxic brain injury    Hypernatremia     Impression/Problem List:     Severe sepsis  Complicated UTI with frequent UTIs  Pyeloureteritis, dysfunction of the indwelling stent is suspected per CT  Chronic respiratory failure with hypoxia  History of anoxic brain injury  Functional quadriplegia  Hypernatremia  History of retroperitoneal hematoma and hematoma with abscess leading to necrotizing infection of the pelvis and bladder rupture s/p nephroureteral stent placement,  Tracheostomy present  Severe malnutrition  Hypertension  Left-sided nephrostomy tube in place  Chronic pulmonary embolism  Iron deficiency anemia  Migraine  Mitral valve prolapse  Dysphagia s/p PEG  Contractures  Debility            Recommendations/Plan:  1. plan: Goals of care include CODE STATUS no CPR/full support interventions.     Family support: The patient receives support from her .  Advance Directives: No advance directive on file     POA/Healthcare surrogate-spouse, Grant.     2.  Palliative care encounter  - Prognosis is poor long-term secondary to severe sepsis, complicated UTI with frequent UTIs, suspected dysfunction, with indwelling stent, respiratory failure, anoxic brain injury, quadriplegia, debility, and multiple comorbidities.  -Family appears to have poor prognostic awareness.      -Resident of [facility].       -Last seen by this provider during November hospitalization.  A MOST document was completed on 11/27 to include no CPR/limited support interventions, no intubation, no NIPPV.       -Urine and blood cultures pending.  Treated with IV antibiotics and IV fluids.   -Infectious disease, GI, and urology consulted.    -Evaluated by speech therapy and not appropriate for skilled intervention as she does not follow commands and not appropriate for swallow evaluation.     2/8-CODE STATUS clarified.  Will plan to  complete a MOST document to include no CPR/full support interventions.    -Spouse wishes to pursue PEG tube exchange and wishes for patient to be transferred to tertiary facility for urologic stent exchange and prefers [facility], Dr. Rey.    -family not prepared to de-escalate care measures at this juncture and we will continue to provide family support as needed.    2/15-continue supportive measures  -plans to outpatient follow-up at tertiary facility to address uropathies  -Anticipate back to nursing facility as prior  -High risk for rehospitalization's  -MOST document reviewed and initiated.  Attending to complete.     Thank you for allowing us to participate in patient's plan of care. Palliative Care Team will follow patient as needed.     Roxane Sahni, APRN  2/15/2024  16:00 CST

## 2024-02-15 NOTE — PLAN OF CARE
Problem: Skin Injury Risk Increased  Goal: Skin Health and Integrity  Outcome: Ongoing, Progressing   Goal Outcome Evaluation:                      Patient at risk for skin injury due to contractures and incontinence. Will continue to assess for breakdown

## 2024-02-16 ENCOUNTER — APPOINTMENT (OUTPATIENT)
Dept: GENERAL RADIOLOGY | Facility: HOSPITAL | Age: 49
DRG: 698 | End: 2024-02-16
Payer: MEDICARE

## 2024-02-16 LAB
GLUCOSE BLDC GLUCOMTR-MCNC: 108 MG/DL (ref 70–130)
GLUCOSE BLDC GLUCOMTR-MCNC: 127 MG/DL (ref 70–130)
GLUCOSE BLDC GLUCOMTR-MCNC: 128 MG/DL (ref 70–130)

## 2024-02-16 PROCEDURE — 71045 X-RAY EXAM CHEST 1 VIEW: CPT

## 2024-02-16 PROCEDURE — 25010000002 HEPARIN (PORCINE) PER 1000 UNITS: Performed by: FAMILY MEDICINE

## 2024-02-16 PROCEDURE — 82948 REAGENT STRIP/BLOOD GLUCOSE: CPT

## 2024-02-16 RX ORDER — HYDROCODONE BITARTRATE AND ACETAMINOPHEN 5; 325 MG/1; MG/1
1 TABLET ORAL EVERY 6 HOURS PRN
Status: DISCONTINUED | OUTPATIENT
Start: 2024-02-16 | End: 2024-02-19 | Stop reason: HOSPADM

## 2024-02-16 RX ADMIN — Medication 10 ML: at 08:48

## 2024-02-16 RX ADMIN — HEPARIN SODIUM 5000 UNITS: 5000 INJECTION INTRAVENOUS; SUBCUTANEOUS at 08:48

## 2024-02-16 RX ADMIN — BACLOFEN 10 MG: 10 TABLET ORAL at 08:48

## 2024-02-16 RX ADMIN — FAMOTIDINE 20 MG: 10 INJECTION INTRAVENOUS at 08:48

## 2024-02-16 RX ADMIN — METOPROLOL TARTRATE 50 MG: 50 TABLET, FILM COATED ORAL at 22:00

## 2024-02-16 RX ADMIN — Medication 250 MG: at 08:48

## 2024-02-16 RX ADMIN — HYDROCODONE BITARTRATE AND ACETAMINOPHEN 1 TABLET: 5; 325 TABLET ORAL at 22:01

## 2024-02-16 RX ADMIN — Medication 1 TABLET: at 08:48

## 2024-02-16 RX ADMIN — METOPROLOL TARTRATE 50 MG: 50 TABLET, FILM COATED ORAL at 08:48

## 2024-02-16 RX ADMIN — FAMOTIDINE 20 MG: 10 INJECTION INTRAVENOUS at 22:03

## 2024-02-16 RX ADMIN — Medication 10 ML: at 22:01

## 2024-02-16 RX ADMIN — HEPARIN SODIUM 5000 UNITS: 5000 INJECTION INTRAVENOUS; SUBCUTANEOUS at 22:00

## 2024-02-16 RX ADMIN — BACLOFEN 10 MG: 10 TABLET ORAL at 22:00

## 2024-02-16 RX ADMIN — BACLOFEN 10 MG: 10 TABLET ORAL at 15:52

## 2024-02-16 RX ADMIN — BACLOFEN 10 MG: 10 TABLET ORAL at 03:01

## 2024-02-16 NOTE — PROGRESS NOTES
[facility] Medicine Services  INPATIENT PROGRESS NOTE    Patient Name: Namita Zabala  Date of Admission: 2/7/2024  Today's Date: 02/16/24  Length of Stay: 9  Primary Care Physician: David Lara MD    Subjective   Chief Complaint: Fevers  Addendum    Fever        48-year-old female with history of anoxic brain injury, spastic quadriplegia, percutaneous endoscopic gastrostomy tube placement, severe malnutrition, frequent urinary tract infections with secondary sepsis, was admitted to the hospital December 2023 and treated with broad-spectrum antibiotics; history of mitral valve prolapse, nephrolithiasis, with a left percutaneous nephroureteral drain in place, last exchanged documented on January 2023.      Patient came from nursing [facility]; as per family member patient has had a malfunctioning percutaneous gastrostomy tube for several days, and approximately 3 days ago developed fevers.  There is no other information available at this time.  Not able to provide any history due to neurological condition.    Tracheostomy  On medical floor  No fevers reported  No other events reported  Unable to obtain labs today  2/15  history of anoxic brain injury, spastic quadriplegia, percutaneous endoscopic gastrostomy tube placement, severe malnutrition, frequent urinary tract infections with secondary sepsis, was admitted to the hospital December 2023 and treated with broad-spectrum antibiotics; history of mitral valve prolapse, nephrolithiasis, with a left percutaneous nephroureteral drain in place, last exchanged documented on January 2023.      Patient came from [facility]; as per family member patient has had a malfunctioning percutaneous gastrostomy tube for several days, and approximately 3 days ago developed fevers.   Initiated infectious disease patient was admitted with sepsis assumed from complicated UTI with chronic indwelling Mojica catheter  and left nephroureteral stent. Left nephroureteral stent overdue for exchange.-Per JOSE Salazar W note.  Now working on outpatient [facility] patient does have a history of ESBL Pseudomonas and Proteus infection she was taken off meropenem Per ID  2/16  Appreciate palliative care patient does have very poor quality of life and very poor prognosis remains DNR , patient and family seem to have very poor insight to her overall poor prognosis, remains afebrile off antibiotics white count unremarkable ID has signed off patient has an appointment as outpatient for left ureteral stent replacement at [facility] Would expect urology to order periprocedure antibiotics when patient gets her left nephroureteral stent exchanged,therefore no additional antibiotics are being ordered while awaiting that procedure, patient currently is requiring 8 L oxygen suspect this is much higher than her baseline I will keep her today do chest x-ray and titrate down her oxygen accordingly       All pertinent negatives and positives are as above. All other systems have been reviewed and are negative unless otherwise stated.     Objective    Temp:  [97.3 °F (36.3 °C)-98.2 °F (36.8 °C)] 98.2 °F (36.8 °C)  Heart Rate:  [64-96] 96  Resp:  [16-18] 18  BP: ()/(59-79) 115/79  Physical Exam  Constitutional:       Appearance: Chronically ill-appearing.  Multiple contractures.  HENT:      Head: Normocephalic and atraumatic.      Nose: Nose normal.      Mouth/Throat:      Mouth: Mucous membranes are dry.     Pharynx: Able to evaluate  Eyes:      Extraocular Movements: Extraocular movements preserved     Conjunctiva/sclera: Conjunctivae normal.      Pupils: Pupils are equal, round, and reactive to light.   Cardiovascular:      Rate and Rhythm: Tachycardic, normal rate and regular rhythm.      Pulses: Fast pulse  Pulmonary:      Effort: No tachypnea.     Breath sounds: Reduced breath sounds on the right due to positioning.  Abdominal:       General: Abdominal scar extensive, with no open wounds; there is a percutaneous gastrostomy tube in place.  Abdomen is nondistended.  Bowel sounds are diminished.  Genitourinary:  nephrostomy tube on the left.  Mojica catheter in place.     Palpations: Abdomen is soft.      Tenderness: There is no guarding or rebound.   Musculoskeletal:         General: Multiple upper and lower extremity flexion contractures; decreased range of motion.    Extremities: Multiple contractures as per musculoskeletal exam; no lower extremity edema.  Skin:     Capillary Refill: Capillary refill takes less than 2 seconds.      Coloration: Skin is not jaundiced.      Findings: No rash.   Neurological:      General: Spastic quadriplegia.  I am not able to assess patient's orientation or memory.  Speech:  with aphasia.   Psychiatric evaluation not able to be performed.      Results Review:  I have reviewed the labs, radiology results, and diagnostic studies.    Laboratory Data:   Results from last 7 days   Lab Units 02/14/24  0951 02/11/24  1241 02/10/24  1020   WBC 10*3/mm3 7.78 7.99 11.40*   HEMOGLOBIN g/dL 10.9* 13.4 10.8*   HEMATOCRIT % 34.2 41.8 32.6*   PLATELETS 10*3/mm3 196 178 168        Results from last 7 days   Lab Units 02/14/24  0951 02/13/24  1421 02/11/24  1241   SODIUM mmol/L 139 141 141   POTASSIUM mmol/L 3.9 4.0 5.2   CHLORIDE mmol/L 103 103 107   CO2 mmol/L 25.0 27.0 22.0   BUN mg/dL 33* 31* 16   CREATININE mg/dL 0.40* 0.41* 0.45*   CALCIUM mg/dL 9.9 10.0 10.1   GLUCOSE mg/dL 134* 127* 128*       Culture Data:   Blood Culture   Date Value Ref Range Status   02/07/2024 Abnormal Stain (C)  Preliminary       Radiology Data:   Imaging Results (Last 24 Hours)       ** No results found for the last 24 hours. **            I have reviewed the patient's current medications.     Assessment/Plan   Assessment  Active Hospital Problems    Diagnosis     History of anoxic brain injury     Infection associated with nephrostomy  catheter     Functional quadriplegia     Dysphagia     Severe malnutrition     Complicated UTI (urinary tract infection)      Severe sepsis, resolved  Recurrent urinary tract infection, complicated, associated to nephrostomy tube/indwelling catheter/ureteral stent  Status post replacement of 18 Fr percutaneous endoscopic gastrostomy tube 2/08/24  Hypernatremia, resolved  Mild hypokalemia, resolved  History of anoxic brain injury 2022  Chronic left nephrostomy tube in place  Spastic quadriplegia  History of pulmonary embolism in 2021  History of COVID-19 in 2021             CT scan report  1. A left-sided nephrostomy tube and ureteral stent remains in place and  well-positioned. There is mild progressive distention of the upper  tracts of the left kidney as well as of the proximal and mid left ureter  when compared to the previous exam. There is associated pyeloureteritis  with urothelial thickening and stranding surrounding the left renal  pelvis and proximal ureter. Dysfunction of the indwelling stent is  suspected. The right kidney demonstrates nonobstructing nephrolithiasis.  No right-sided ureteral stone is present.  2. Large volume of stool within the colon. No fecal impaction within the  rectal vault. The stomach is moderately distended with air and fluid  with an air-fluid level but without definite gastric wall thickening or  convincing evidence of gastric outlet obstruction. No evidence of  mechanical obstruction.  3. Just to the left of midline at the umbilicus there is a small  abdominal wall defect. Slight protrusion of a portion of the transverse  colon into this defect is present without incarceration or obstruction.  4. The uterus and adnexa are unremarkable.      Results of nuclear medicine test reviewed  1. Symmetric flow to the kidneys. Differential function is 53.4%  left-sided and 46.6% right-sided.  2. Normal excretion curve for the right kidney with a half-emptying time  of 12.8 minutes. This  is mildly accentuated following the administration  of IV Lasix.  3. Hydronephrotic left kidney with progressive accumulation of activity  within the renal collecting system until the administration of IV Lasix.  Following the administration of 40 mg of Lasix intravenously there is  rapid excretion of activity from the upper tracts of the left kidney  with a half-emptying time of less than 6 minutes.       -No fevers  -Urine culture 1/29/24: E. coli ESBL sensitive to ertapenem and meropenem  Proteus mirabilis resistant to quinolones and nitrofurantoin  Pseudomonas resistant to Levaquin.  -Blood culture 2/7/24: staph coag negative x 1, probable contaminant  -Urine culture 2/7/24: mixed oscar    Left nephroureteral tube drainage 150+75 ml, reported    Prior records reviewed: Last nephroureteral exchange performed January 4, 2023 at [facility] interventional radiology.      Treatment Plan    Treated with meropenem IV, 02/07 to 02/11.    S/P percutaneous gastrostomy tube exchange.  Continue enteral feedings, per nutrition recommendations.     Mojica catheter exchanged  2/8/2024.      Dr. Rey texted me back 2/13/24 and agreed with plan.  She believes patient may no longer need the nephroureteral stent but will need to see if the ureter looks normal as it had a leak in the past..  An antegrade nephrostogram would be required.  If the ureter is normal, the stent could be removed or capped for safety removal in 1 to 2 weeks.  This will be scheduled outpatient; attempt to schedule at [place] or  in process.      Heparin subcutaneous for DVT prophylaxis.  Prognosis is poor given functional status.      Medical Decision Making  Number and Complexity of problems: 10, high complexity  Differential Diagnosis: See above    Conditions and Status        Condition is improving.     MDM Data  External documents reviewed: None  Cardiac tracing (EKG, telemetry) interpretation: Reviewed, sinus tachycardia  Radiology  interpretation: Radiology reports reviewed  Labs reviewed: Yes  Any tests that were considered but not ordered: No     Decision rules/scores evaluated (example [id number], Wells, etc): None     Discussed with: Nurse staff and specialists     Care Planning  Shared decision making: With family  Code status and discussions: No chest compressions.    Disposition  Social Determinants of Health that impact treatment or disposition: Nursing home.  Bedbound.  I expect the patient to be transferred for nephroureteral stent exchange.    Electronically signed by Farhat Chanel MD, 02/16/24, 09:11 CST.         Electronically signed by Tae Charles MD, 02/13/24, 4:15 PM CST.

## 2024-02-16 NOTE — CASE MANAGEMENT/SOCIAL WORK
Continued Stay Note  [facility]     Patient Name: Namita Zabala  MRN: [id number]  Today's Date: 2/16/2024    Admit Date: 2/7/2024    Plan: [facility]   Discharge Plan       Row Name 02/16/24 1430       Plan    Plan [facility]    Patient/Family in Agreement with Plan yes    Plan Comments Pt will return to [facility] [phone] at d/c. D/C summary should be faxed to [phone]. Nursing staff and HUC have been trying to make appointment at [facility] [phone] for stent exchange but they were told [facility] would call when they make the appointment.    Final Discharge Disposition Code 04 - intermediate care facility                   Discharge Codes    No documentation.                       ORAL Mcintyre

## 2024-02-16 NOTE — PLAN OF CARE
Goal Outcome Evaluation:              Outcome Evaluation: Patient nonverbal. Trach collar - 8LPM humidified. Contractured. No signs of discomfort or pain. IV to R wrist IID. PEG; Peptamen 1.5 @ 45mL/hr/35 flush Qhr. L nephrostomy tube; draining. Chronic stratton to bedside. Turning Q2. Patient appears to be resting comfortably. Heparin for VTE prevention. Awaiting d/c back to SNF. VSS. Safety maintained.

## 2024-02-17 LAB
GLUCOSE BLDC GLUCOMTR-MCNC: 105 MG/DL (ref 70–130)
GLUCOSE BLDC GLUCOMTR-MCNC: 113 MG/DL (ref 70–130)
GLUCOSE BLDC GLUCOMTR-MCNC: 113 MG/DL (ref 70–130)
GLUCOSE BLDC GLUCOMTR-MCNC: 117 MG/DL (ref 70–130)

## 2024-02-17 PROCEDURE — 94799 UNLISTED PULMONARY SVC/PX: CPT

## 2024-02-17 PROCEDURE — 82948 REAGENT STRIP/BLOOD GLUCOSE: CPT

## 2024-02-17 PROCEDURE — 25010000002 HEPARIN (PORCINE) PER 1000 UNITS: Performed by: FAMILY MEDICINE

## 2024-02-17 RX ADMIN — BACLOFEN 10 MG: 10 TABLET ORAL at 22:01

## 2024-02-17 RX ADMIN — Medication 10 ML: at 09:22

## 2024-02-17 RX ADMIN — FAMOTIDINE 20 MG: 10 INJECTION INTRAVENOUS at 22:01

## 2024-02-17 RX ADMIN — HYDROCODONE BITARTRATE AND ACETAMINOPHEN 1 TABLET: 5; 325 TABLET ORAL at 15:21

## 2024-02-17 RX ADMIN — Medication 1 TABLET: at 09:20

## 2024-02-17 RX ADMIN — FAMOTIDINE 20 MG: 10 INJECTION INTRAVENOUS at 09:21

## 2024-02-17 RX ADMIN — BACLOFEN 10 MG: 10 TABLET ORAL at 09:20

## 2024-02-17 RX ADMIN — Medication 10 ML: at 22:01

## 2024-02-17 RX ADMIN — BACLOFEN 10 MG: 10 TABLET ORAL at 04:42

## 2024-02-17 RX ADMIN — Medication 250 MG: at 09:21

## 2024-02-17 RX ADMIN — BACLOFEN 10 MG: 10 TABLET ORAL at 15:21

## 2024-02-17 RX ADMIN — METOPROLOL TARTRATE 50 MG: 50 TABLET, FILM COATED ORAL at 22:01

## 2024-02-17 RX ADMIN — HEPARIN SODIUM 5000 UNITS: 5000 INJECTION INTRAVENOUS; SUBCUTANEOUS at 09:21

## 2024-02-17 RX ADMIN — HEPARIN SODIUM 5000 UNITS: 5000 INJECTION INTRAVENOUS; SUBCUTANEOUS at 22:01

## 2024-02-17 RX ADMIN — METOPROLOL TARTRATE 50 MG: 50 TABLET, FILM COATED ORAL at 09:20

## 2024-02-17 NOTE — PLAN OF CARE
Goal Outcome Evaluation:        Patient nonverbal. Trach collar - 8LPM humidified. Contractured. No signs of discomfort or pain. IV to R wrist IID. PEG; Peptamen 1.5 @ 45mL/hr/35 flush Qhr. L nephrostomy tube; draining. Chronic stratton to bedside. Turning Q2. Patient appears to be resting comfortably. Heparin for VTE prevention. Awaiting d/c back to SNF. VSS. Safety maintained. Pain meds admin x1 per request from  stating pt is in pain.

## 2024-02-17 NOTE — PROGRESS NOTES
[facility] Medicine Services  INPATIENT PROGRESS NOTE    Patient Name: Namita Zabala  Date of Admission: 2/7/2024  Today's Date: 02/17/24  Length of Stay: 10  Primary Care Physician: David Lara MD    Subjective   Chief Complaint: Fevers  Addendum    Fever        48-year-old female with history of anoxic brain injury, spastic quadriplegia, percutaneous endoscopic gastrostomy tube placement, severe malnutrition, frequent urinary tract infections with secondary sepsis, was admitted to the hospital December 2023 and treated with broad-spectrum antibiotics; history of mitral valve prolapse, nephrolithiasis, with a left percutaneous nephroureteral drain in place, last exchanged documented on January 2023.      Patient came from nursing [facility]; as per family member patient has had a malfunctioning percutaneous gastrostomy tube for several days, and approximately 3 days ago developed fevers.  There is no other information available at this time.  Not able to provide any history due to neurological condition.    Tracheostomy  On medical floor  No fevers reported  No other events reported  Unable to obtain labs today  2/15  history of anoxic brain injury, spastic quadriplegia, percutaneous endoscopic gastrostomy tube placement, severe malnutrition, frequent urinary tract infections with secondary sepsis, was admitted to the hospital December 2023 and treated with broad-spectrum antibiotics; history of mitral valve prolapse, nephrolithiasis, with a left percutaneous nephroureteral drain in place, last exchanged documented on January 2023.      Patient came from [facility]; as per family member patient has had a malfunctioning percutaneous gastrostomy tube for several days, and approximately 3 days ago developed fevers.   Initiated infectious disease patient was admitted with sepsis assumed from complicated UTI with chronic indwelling Mojica catheter  and left nephroureteral stent. Left nephroureteral stent overdue for exchange.-Per JOSE Salazar W note.  Now working on outpatient [facility] patient does have a history of ESBL Pseudomonas and Proteus infection she was taken off meropenem Per ID  2/16  Appreciate palliative care patient does have very poor quality of life and very poor prognosis remains DNR , patient and family seem to have very poor insight to her overall poor prognosis, remains afebrile off antibiotics white count unremarkable ID has signed off patient has an appointment as outpatient for left ureteral stent replacement at [place] Would expect urology to order periprocedure antibiotics when patient gets her left nephroureteral stent exchanged,therefore no additional antibiotics are being ordered while awaiting that procedure, patient currently is requiring 8 L oxygen suspect this is much higher than her baseline I will keep her today do chest x-ray and titrate down her oxygen accordingly  2/17  Oxygenation is down to 6 L, chest x-ray showing right lower lobe atelectasis moderate gaseous extension of the stomach     All pertinent negatives and positives are as above. All other systems have been reviewed and are negative unless otherwise stated.     Objective    Temp:  [97.4 °F (36.3 °C)-98.9 °F (37.2 °C)] 98.4 °F (36.9 °C)  Heart Rate:  [72-98] 76  Resp:  [16-18] 16  BP: ()/(50-86) 106/56  Physical Exam  Constitutional:       Appearance: Chronically ill-appearing.  Multiple contractures.  HENT:      Head: Normocephalic and atraumatic.      Nose: Nose normal.      Mouth/Throat:      Mouth: Mucous membranes are dry.     Pharynx: Able to evaluate  Eyes:      Extraocular Movements: Extraocular movements preserved     Conjunctiva/sclera: Conjunctivae normal.      Pupils: Pupils are equal, round, and reactive to light.   Cardiovascular:      Rate and Rhythm: Tachycardic, normal rate and regular rhythm.      Pulses: Fast  pulse  Pulmonary:      Effort: No tachypnea.     Breath sounds: Reduced breath sounds on the right due to positioning.  Abdominal:      General: Abdominal scar extensive, with no open wounds; there is a percutaneous gastrostomy tube in place.  Abdomen is nondistended.  Bowel sounds are diminished.  Genitourinary:  nephrostomy tube on the left.  Mojica catheter in place.     Palpations: Abdomen is soft.      Tenderness: There is no guarding or rebound.   Musculoskeletal:         General: Multiple upper and lower extremity flexion contractures; decreased range of motion.    Extremities: Multiple contractures as per musculoskeletal exam; no lower extremity edema.  Skin:     Capillary Refill: Capillary refill takes less than 2 seconds.      Coloration: Skin is not jaundiced.      Findings: No rash.   Neurological:      General: Spastic quadriplegia.  I am not able to assess patient's orientation or memory.  Speech:  with aphasia.   Psychiatric evaluation not able to be performed.      Results Review:  I have reviewed the labs, radiology results, and diagnostic studies.    Laboratory Data:   Results from last 7 days   Lab Units 02/14/24  0951 02/11/24  1241 02/10/24  1020   WBC 10*3/mm3 7.78 7.99 11.40*   HEMOGLOBIN g/dL 10.9* 13.4 10.8*   HEMATOCRIT % 34.2 41.8 32.6*   PLATELETS 10*3/mm3 196 178 168        Results from last 7 days   Lab Units 02/14/24  0951 02/13/24  1421 02/11/24  1241   SODIUM mmol/L 139 141 141   POTASSIUM mmol/L 3.9 4.0 5.2   CHLORIDE mmol/L 103 103 107   CO2 mmol/L 25.0 27.0 22.0   BUN mg/dL 33* 31* 16   CREATININE mg/dL 0.40* 0.41* 0.45*   CALCIUM mg/dL 9.9 10.0 10.1   GLUCOSE mg/dL 134* 127* 128*       Culture Data:   Blood Culture   Date Value Ref Range Status   02/07/2024 Abnormal Stain (C)  Preliminary       Radiology Data:   Imaging Results (Last 24 Hours)       Procedure Component Value Units Date/Time    XR Chest 1 View [[id number]] Collected: 02/16/24 1504     Updated: 02/16/24 1506     Narrative:      EXAMINATION: XR CHEST 1 VW-  2/16/2024 3:04 PM     HISTORY: Hypoxemia     FINDINGS: Today's exam is compared to previous study of 2/7/2024. A  tracheostomy tube is in place. Right basilar atelectasis is present.  There is moderate gaseous distention of the stomach. The heart is mildly  enlarged. No acute infiltrate or effusion.       Impression:      1.. Right basilar atelectasis. Lungs are otherwise clear. No effusion or  free air.  2. Moderate gaseous distention of the stomach.     This report was signed and finalized on 2/16/2024 3:04 PM by Dr. Yuniel De Jesus MD.               I have reviewed the patient's current medications.     Assessment/Plan   Assessment  Active Hospital Problems    Diagnosis     History of anoxic brain injury     Infection associated with nephrostomy catheter     Functional quadriplegia     Dysphagia     Severe malnutrition     Complicated UTI (urinary tract infection)      Severe sepsis, resolved  Recurrent urinary tract infection, complicated, associated to nephrostomy tube/indwelling catheter/ureteral stent  Status post replacement of 18 Fr percutaneous endoscopic gastrostomy tube 2/08/24  Hypernatremia, resolved  Mild hypokalemia, resolved  History of anoxic brain injury 2022  Chronic left nephrostomy tube in place  Spastic quadriplegia  History of pulmonary embolism in 2021  History of COVID-19 in 2021             CT scan report  1. A left-sided nephrostomy tube and ureteral stent remains in place and  well-positioned. There is mild progressive distention of the upper  tracts of the left kidney as well as of the proximal and mid left ureter  when compared to the previous exam. There is associated pyeloureteritis  with urothelial thickening and stranding surrounding the left renal  pelvis and proximal ureter. Dysfunction of the indwelling stent is  suspected. The right kidney demonstrates nonobstructing nephrolithiasis.  No right-sided ureteral stone is present.  2.  Large volume of stool within the colon. No fecal impaction within the  rectal vault. The stomach is moderately distended with air and fluid  with an air-fluid level but without definite gastric wall thickening or  convincing evidence of gastric outlet obstruction. No evidence of  mechanical obstruction.  3. Just to the left of midline at the umbilicus there is a small  abdominal wall defect. Slight protrusion of a portion of the transverse  colon into this defect is present without incarceration or obstruction.  4. The uterus and adnexa are unremarkable.      Results of nuclear medicine test reviewed  1. Symmetric flow to the kidneys. Differential function is 53.4%  left-sided and 46.6% right-sided.  2. Normal excretion curve for the right kidney with a half-emptying time  of 12.8 minutes. This is mildly accentuated following the administration  of IV Lasix.  3. Hydronephrotic left kidney with progressive accumulation of activity  within the renal collecting system until the administration of IV Lasix.  Following the administration of 40 mg of Lasix intravenously there is  rapid excretion of activity from the upper tracts of the left kidney  with a half-emptying time of less than 6 minutes.       -No fevers  -Urine culture 1/29/24: E. coli ESBL sensitive to ertapenem and meropenem  Proteus mirabilis resistant to quinolones and nitrofurantoin  Pseudomonas resistant to Levaquin.  -Blood culture 2/7/24: staph coag negative x 1, probable contaminant  -Urine culture 2/7/24: mixed oscar    Left nephroureteral tube drainage 150+75 ml, reported    Prior records reviewed: Last nephroureteral exchange performed January 4, 2023 at [facility] interventional radiology.      Treatment Plan    Treated with meropenem IV, 02/07 to 02/11.    S/P percutaneous gastrostomy tube exchange.  Continue enteral feedings, per nutrition recommendations.     Mojica catheter exchanged  2/8/2024.      Dr. Rey texted me back 2/13/24 and agreed  with plan.  She believes patient may no longer need the nephroureteral stent but will need to see if the ureter looks normal as it had a leak in the past..  An antegrade nephrostogram would be required.  If the ureter is normal, the stent could be removed or capped for safety removal in 1 to 2 weeks.  This will be scheduled outpatient; attempt to schedule at [place] or  in process.      Heparin subcutaneous for DVT prophylaxis.  Prognosis is poor given functional status.      Medical Decision Making  Number and Complexity of problems: 10, high complexity  Differential Diagnosis: See above    Conditions and Status        Condition is improving.     MDM Data  External documents reviewed: None  Cardiac tracing (EKG, telemetry) interpretation: Reviewed, sinus tachycardia  Radiology interpretation: Radiology reports reviewed  Labs reviewed: Yes  Any tests that were considered but not ordered: No     Decision rules/scores evaluated (example [id number], Wells, etc): None     Discussed with: Nurse staff and specialists     Care Planning  Shared decision making: With family  Code status and discussions: No chest compressions.    Disposition  Social Determinants of Health that impact treatment or disposition: Nursing home.  Bedbound.  I expect the patient to be transferred for nephroureteral stent exchange.    Electronically signed by Farhat Chanel MD, 02/17/24, 09:41 CST.         Electronically signed by Tae Charles MD, 02/13/24, 4:15 PM CST.

## 2024-02-17 NOTE — PLAN OF CARE
Goal Outcome Evaluation:              Outcome Evaluation: Patient transitioned to room air, remains contracted and nonverbal. Pt medicated x1 for pain dt grimacing. PEG peptamen 1.5 @45 mL/hr 35 flush. L Nephrostomy tube draining, stratton output adequate. VSS, safetly maintained, heparin VTE prevention.

## 2024-02-18 LAB
ALBUMIN SERPL-MCNC: 3.8 G/DL (ref 3.5–5.2)
ALBUMIN/GLOB SERPL: 1.3 G/DL
ALP SERPL-CCNC: 69 U/L (ref 39–117)
ALT SERPL W P-5'-P-CCNC: 40 U/L (ref 1–33)
ANION GAP SERPL CALCULATED.3IONS-SCNC: 14 MMOL/L (ref 5–15)
AST SERPL-CCNC: 23 U/L (ref 1–32)
BASOPHILS # BLD AUTO: 0.06 10*3/MM3 (ref 0–0.2)
BASOPHILS NFR BLD AUTO: 0.6 % (ref 0–1.5)
BILIRUB SERPL-MCNC: 0.6 MG/DL (ref 0–1.2)
BUN SERPL-MCNC: 23 MG/DL (ref 6–20)
BUN/CREAT SERPL: 65.7 (ref 7–25)
CALCIUM SPEC-SCNC: 9.8 MG/DL (ref 8.6–10.5)
CHLORIDE SERPL-SCNC: 102 MMOL/L (ref 98–107)
CO2 SERPL-SCNC: 22 MMOL/L (ref 22–29)
CREAT SERPL-MCNC: 0.35 MG/DL (ref 0.57–1)
DEPRECATED RDW RBC AUTO: 51.1 FL (ref 37–54)
EGFRCR SERPLBLD CKD-EPI 2021: 127.8 ML/MIN/1.73
EOSINOPHIL # BLD AUTO: 0.38 10*3/MM3 (ref 0–0.4)
EOSINOPHIL NFR BLD AUTO: 3.9 % (ref 0.3–6.2)
ERYTHROCYTE [DISTWIDTH] IN BLOOD BY AUTOMATED COUNT: 14.6 % (ref 12.3–15.4)
GLOBULIN UR ELPH-MCNC: 3 GM/DL
GLUCOSE BLDC GLUCOMTR-MCNC: 101 MG/DL (ref 70–130)
GLUCOSE BLDC GLUCOMTR-MCNC: 117 MG/DL (ref 70–130)
GLUCOSE BLDC GLUCOMTR-MCNC: 129 MG/DL (ref 70–130)
GLUCOSE SERPL-MCNC: 123 MG/DL (ref 65–99)
HCT VFR BLD AUTO: 31.3 % (ref 34–46.6)
HGB BLD-MCNC: 10.6 G/DL (ref 12–15.9)
IMM GRANULOCYTES # BLD AUTO: 0.1 10*3/MM3 (ref 0–0.05)
IMM GRANULOCYTES NFR BLD AUTO: 1 % (ref 0–0.5)
LYMPHOCYTES # BLD AUTO: 1.66 10*3/MM3 (ref 0.7–3.1)
LYMPHOCYTES NFR BLD AUTO: 17.2 % (ref 19.6–45.3)
MCH RBC QN AUTO: 31.7 PG (ref 26.6–33)
MCHC RBC AUTO-ENTMCNC: 33.9 G/DL (ref 31.5–35.7)
MCV RBC AUTO: 93.7 FL (ref 79–97)
MONOCYTES # BLD AUTO: 0.76 10*3/MM3 (ref 0.1–0.9)
MONOCYTES NFR BLD AUTO: 7.9 % (ref 5–12)
NEUTROPHILS NFR BLD AUTO: 6.71 10*3/MM3 (ref 1.7–7)
NEUTROPHILS NFR BLD AUTO: 69.4 % (ref 42.7–76)
NRBC BLD AUTO-RTO: 0 /100 WBC (ref 0–0.2)
PLATELET # BLD AUTO: 223 10*3/MM3 (ref 140–450)
PMV BLD AUTO: 11.4 FL (ref 6–12)
POTASSIUM SERPL-SCNC: 5.3 MMOL/L (ref 3.5–5.2)
PROT SERPL-MCNC: 6.8 G/DL (ref 6–8.5)
RBC # BLD AUTO: 3.34 10*6/MM3 (ref 3.77–5.28)
SODIUM SERPL-SCNC: 138 MMOL/L (ref 136–145)
WBC NRBC COR # BLD AUTO: 9.67 10*3/MM3 (ref 3.4–10.8)

## 2024-02-18 PROCEDURE — 82948 REAGENT STRIP/BLOOD GLUCOSE: CPT

## 2024-02-18 PROCEDURE — 85025 COMPLETE CBC W/AUTO DIFF WBC: CPT | Performed by: HOSPITALIST

## 2024-02-18 PROCEDURE — 80053 COMPREHEN METABOLIC PANEL: CPT | Performed by: HOSPITALIST

## 2024-02-18 PROCEDURE — 25010000002 HEPARIN (PORCINE) PER 1000 UNITS: Performed by: FAMILY MEDICINE

## 2024-02-18 RX ADMIN — HEPARIN SODIUM 5000 UNITS: 5000 INJECTION INTRAVENOUS; SUBCUTANEOUS at 09:20

## 2024-02-18 RX ADMIN — Medication 10 ML: at 20:48

## 2024-02-18 RX ADMIN — BACLOFEN 10 MG: 10 TABLET ORAL at 20:45

## 2024-02-18 RX ADMIN — Medication 1 TABLET: at 09:20

## 2024-02-18 RX ADMIN — HYDROCODONE BITARTRATE AND ACETAMINOPHEN 1 TABLET: 5; 325 TABLET ORAL at 00:52

## 2024-02-18 RX ADMIN — HYDROCODONE BITARTRATE AND ACETAMINOPHEN 1 TABLET: 5; 325 TABLET ORAL at 20:45

## 2024-02-18 RX ADMIN — FAMOTIDINE 20 MG: 10 INJECTION INTRAVENOUS at 09:25

## 2024-02-18 RX ADMIN — Medication 250 MG: at 09:20

## 2024-02-18 RX ADMIN — HEPARIN SODIUM 5000 UNITS: 5000 INJECTION INTRAVENOUS; SUBCUTANEOUS at 20:45

## 2024-02-18 RX ADMIN — FAMOTIDINE 20 MG: 10 INJECTION INTRAVENOUS at 20:45

## 2024-02-18 RX ADMIN — METOPROLOL TARTRATE 50 MG: 50 TABLET, FILM COATED ORAL at 20:45

## 2024-02-18 RX ADMIN — Medication 10 ML: at 09:21

## 2024-02-18 RX ADMIN — BACLOFEN 10 MG: 10 TABLET ORAL at 04:43

## 2024-02-18 RX ADMIN — BACLOFEN 10 MG: 10 TABLET ORAL at 16:11

## 2024-02-18 RX ADMIN — BACLOFEN 10 MG: 10 TABLET ORAL at 09:20

## 2024-02-18 NOTE — PLAN OF CARE
Goal Outcome Evaluation: No complaints of pain this shift.  New tube feeding setup this shift. Patient has been repositioned every 2 hours as ordered. Patient safety to be maintained this shift. Patient safety to be maintained this shift, continue to monitor and report abnormal to provider.

## 2024-02-18 NOTE — PROGRESS NOTES
[facility] Medicine Services  INPATIENT PROGRESS NOTE    Patient Name: Namita Zabala  Date of Admission: 2/7/2024  Today's Date: 02/18/24  Length of Stay: 11  Primary Care Physician: David Lara MD    Subjective   Chief Complaint: Fevers  Addendum    Fever        48-year-old female with history of anoxic brain injury, spastic quadriplegia, percutaneous endoscopic gastrostomy tube placement, severe malnutrition, frequent urinary tract infections with secondary sepsis, was admitted to the hospital December 2023 and treated with broad-spectrum antibiotics; history of mitral valve prolapse, nephrolithiasis, with a left percutaneous nephroureteral drain in place, last exchanged documented on January 2023.      Patient came from nursing [facility]; as per family member patient has had a malfunctioning percutaneous gastrostomy tube for several days, and approximately 3 days ago developed fevers.  There is no other information available at this time.  Not able to provide any history due to neurological condition.    Tracheostomy  On medical floor  No fevers reported  No other events reported  Unable to obtain labs today  2/15  history of anoxic brain injury, spastic quadriplegia, percutaneous endoscopic gastrostomy tube placement, severe malnutrition, frequent urinary tract infections with secondary sepsis, was admitted to the hospital December 2023 and treated with broad-spectrum antibiotics; history of mitral valve prolapse, nephrolithiasis, with a left percutaneous nephroureteral drain in place, last exchanged documented on January 2023.      Patient came from [facility]; as per family member patient has had a malfunctioning percutaneous gastrostomy tube for several days, and approximately 3 days ago developed fevers.   Initiated infectious disease patient was admitted with sepsis assumed from complicated UTI with chronic indwelling Mojica catheter  and left nephroureteral stent. Left nephroureteral stent overdue for exchange.-Per JOSE Salazar W note.  Now working on outpatient [facility] patient does have a history of ESBL Pseudomonas and Proteus infection she was taken off meropenem Per ID  2/16  Appreciate palliative care patient does have very poor quality of life and very poor prognosis remains DNR , patient and family seem to have very poor insight to her overall poor prognosis, remains afebrile off antibiotics white count unremarkable ID has signed off patient has an appointment as outpatient for left ureteral stent replacement at [place] Would expect urology to order periprocedure antibiotics when patient gets her left nephroureteral stent exchanged,therefore no additional antibiotics are being ordered while awaiting that procedure, patient currently is requiring 8 L oxygen suspect this is much higher than her baseline I will keep her today do chest x-ray and titrate down her oxygen accordingly  2/17  Oxygenation is down to 6 L, chest x-ray showing right lower lobe atelectasis moderate gaseous extension of the stomach  2/18  Remains on 6 L I spoke to nursing staff yesterday to verify what her baseline and to titrate oxygen to keep pulse ox around 90%  All pertinent negatives and positives are as above. All other systems have been reviewed and are negative unless otherwise stated.     Objective    Temp:  [97.4 °F (36.3 °C)-98.9 °F (37.2 °C)] 97.4 °F (36.3 °C)  Heart Rate:  [73-83] 75  Resp:  [16] 16  BP: ()/(43-64) 95/43  Physical Exam  Constitutional:       Appearance: Chronically ill-appearing.  Multiple contractures.  HENT:      Head: Normocephalic and atraumatic.      Nose: Nose normal.      Mouth/Throat:      Mouth: Mucous membranes are dry.     Pharynx: Able to evaluate  Eyes:      Extraocular Movements: Extraocular movements preserved     Conjunctiva/sclera: Conjunctivae normal.      Pupils: Pupils are equal, round, and  reactive to light.   Cardiovascular:      Rate and Rhythm: Tachycardic, normal rate and regular rhythm.      Pulses: Fast pulse  Pulmonary:      Effort: No tachypnea.     Breath sounds: Reduced breath sounds on the right due to positioning.  Abdominal:      General: Abdominal scar extensive, with no open wounds; there is a percutaneous gastrostomy tube in place.  Abdomen is nondistended.  Bowel sounds are diminished.  Genitourinary:  nephrostomy tube on the left.  Mojica catheter in place.     Palpations: Abdomen is soft.      Tenderness: There is no guarding or rebound.   Musculoskeletal:         General: Multiple upper and lower extremity flexion contractures; decreased range of motion.    Extremities: Multiple contractures as per musculoskeletal exam; no lower extremity edema.  Skin:     Capillary Refill: Capillary refill takes less than 2 seconds.      Coloration: Skin is not jaundiced.      Findings: No rash.   Neurological:      General: Spastic quadriplegia.  I am not able to assess patient's orientation or memory.  Speech:  with aphasia.   Psychiatric evaluation not able to be performed.      Results Review:  I have reviewed the labs, radiology results, and diagnostic studies.    Laboratory Data:   Results from last 7 days   Lab Units 02/14/24  0951 02/11/24  1241   WBC 10*3/mm3 7.78 7.99   HEMOGLOBIN g/dL 10.9* 13.4   HEMATOCRIT % 34.2 41.8   PLATELETS 10*3/mm3 196 178        Results from last 7 days   Lab Units 02/14/24  0951 02/13/24  1421 02/11/24  1241   SODIUM mmol/L 139 141 141   POTASSIUM mmol/L 3.9 4.0 5.2   CHLORIDE mmol/L 103 103 107   CO2 mmol/L 25.0 27.0 22.0   BUN mg/dL 33* 31* 16   CREATININE mg/dL 0.40* 0.41* 0.45*   CALCIUM mg/dL 9.9 10.0 10.1   GLUCOSE mg/dL 134* 127* 128*       Culture Data:   Blood Culture   Date Value Ref Range Status   02/07/2024 Abnormal Stain (C)  Preliminary       Radiology Data:   Imaging Results (Last 24 Hours)       ** No results found for the last 24 hours. **             I have reviewed the patient's current medications.     Assessment/Plan   Assessment  Active Hospital Problems    Diagnosis     History of anoxic brain injury     Infection associated with nephrostomy catheter     Functional quadriplegia     Dysphagia     Severe malnutrition     Complicated UTI (urinary tract infection)      Severe sepsis, resolved  Recurrent urinary tract infection, complicated, associated to nephrostomy tube/indwelling catheter/ureteral stent  Status post replacement of 18 Fr percutaneous endoscopic gastrostomy tube 2/08/24  Hypernatremia, resolved  Mild hypokalemia, resolved  History of anoxic brain injury 2022  Chronic left nephrostomy tube in place  Spastic quadriplegia  History of pulmonary embolism in 2021  History of COVID-19 in 2021             CT scan report  1. A left-sided nephrostomy tube and ureteral stent remains in place and  well-positioned. There is mild progressive distention of the upper  tracts of the left kidney as well as of the proximal and mid left ureter  when compared to the previous exam. There is associated pyeloureteritis  with urothelial thickening and stranding surrounding the left renal  pelvis and proximal ureter. Dysfunction of the indwelling stent is  suspected. The right kidney demonstrates nonobstructing nephrolithiasis.  No right-sided ureteral stone is present.  2. Large volume of stool within the colon. No fecal impaction within the  rectal vault. The stomach is moderately distended with air and fluid  with an air-fluid level but without definite gastric wall thickening or  convincing evidence of gastric outlet obstruction. No evidence of  mechanical obstruction.  3. Just to the left of midline at the umbilicus there is a small  abdominal wall defect. Slight protrusion of a portion of the transverse  colon into this defect is present without incarceration or obstruction.  4. The uterus and adnexa are unremarkable.      Results of nuclear medicine  test reviewed  1. Symmetric flow to the kidneys. Differential function is 53.4%  left-sided and 46.6% right-sided.  2. Normal excretion curve for the right kidney with a half-emptying time  of 12.8 minutes. This is mildly accentuated following the administration  of IV Lasix.  3. Hydronephrotic left kidney with progressive accumulation of activity  within the renal collecting system until the administration of IV Lasix.  Following the administration of 40 mg of Lasix intravenously there is  rapid excretion of activity from the upper tracts of the left kidney  with a half-emptying time of less than 6 minutes.       -No fevers  -Urine culture 1/29/24: E. coli ESBL sensitive to ertapenem and meropenem  Proteus mirabilis resistant to quinolones and nitrofurantoin  Pseudomonas resistant to Levaquin.  -Blood culture 2/7/24: staph coag negative x 1, probable contaminant  -Urine culture 2/7/24: mixed oscar    Left nephroureteral tube drainage 150+75 ml, reported    Prior records reviewed: Last nephroureteral exchange performed January 4, 2023 at [facility] interventional radiology.      Treatment Plan    Treated with meropenem IV, 02/07 to 02/11.    S/P percutaneous gastrostomy tube exchange.  Continue enteral feedings, per nutrition recommendations.     Mojica catheter exchanged  2/8/2024.      Dr. Rey texted me back 2/13/24 and agreed with plan.  She believes patient may no longer need the nephroureteral stent but will need to see if the ureter looks normal as it had a leak in the past..  An antegrade nephrostogram would be required.  If the ureter is normal, the stent could be removed or capped for safety removal in 1 to 2 weeks.  This will be scheduled outpatient; attempt to schedule at [place] or  in process.      Heparin subcutaneous for DVT prophylaxis.  Prognosis is poor given functional status.      Medical Decision Making  Number and Complexity of problems: 10, high complexity  Differential Diagnosis:  See above    Conditions and Status        Condition is improving.     [facility] Data  External documents reviewed: None  Cardiac tracing (EKG, telemetry) interpretation: Reviewed, sinus tachycardia  Radiology interpretation: Radiology reports reviewed  Labs reviewed: Yes  Any tests that were considered but not ordered: No     Decision rules/scores evaluated (example [id number], Wells, etc): None     Discussed with: Nurse staff and specialists     Care Planning  Shared decision making: With family  Code status and discussions: No chest compressions.    Disposition  Social Determinants of Health that impact treatment or disposition: Nursing home.  Bedbound.  I expect the patient to be transferred for nephroureteral stent exchange.    Electronically signed by Farhat Chanel MD, 02/18/24, 07:42 CST.         Electronically signed by Tae Charles MD, 02/13/24, 4:15 PM CST.

## 2024-02-18 NOTE — PLAN OF CARE
Goal Outcome Evaluation:  Plan of Care Reviewed With: patient        Progress: no change    Patient nonverbal. Trach collar in place. Contractures to all ext. Pain med admin with signs of pain or discomfort.  IV to R wrist IID. PEG; Peptamen 1.5 @ 45mL/hr/35 flush Qhr. L nephrostomy tube; draining. Chronic stratton to bedside. Turning Q2. Patient appears to be resting comfortably. Heparin for VTE prevention. Awaiting d/c back to SNF. VSS. Safety maintained.

## 2024-02-19 VITALS
TEMPERATURE: 98.5 F | HEIGHT: 60 IN | RESPIRATION RATE: 16 BRPM | BODY MASS INDEX: 24.24 KG/M2 | WEIGHT: 123.46 LBS | HEART RATE: 77 BPM | DIASTOLIC BLOOD PRESSURE: 50 MMHG | SYSTOLIC BLOOD PRESSURE: 97 MMHG | OXYGEN SATURATION: 97 %

## 2024-02-19 LAB
ALBUMIN SERPL-MCNC: 3.9 G/DL (ref 3.5–5.2)
ALBUMIN/GLOB SERPL: 1.2 G/DL
ALP SERPL-CCNC: 72 U/L (ref 39–117)
ALT SERPL W P-5'-P-CCNC: 37 U/L (ref 1–33)
ANION GAP SERPL CALCULATED.3IONS-SCNC: 12 MMOL/L (ref 5–15)
AST SERPL-CCNC: 17 U/L (ref 1–32)
BASOPHILS # BLD AUTO: 0.04 10*3/MM3 (ref 0–0.2)
BASOPHILS NFR BLD AUTO: 0.4 % (ref 0–1.5)
BILIRUB SERPL-MCNC: 0.5 MG/DL (ref 0–1.2)
BUN SERPL-MCNC: 25 MG/DL (ref 6–20)
BUN/CREAT SERPL: 69.4 (ref 7–25)
CALCIUM SPEC-SCNC: 10.2 MG/DL (ref 8.6–10.5)
CHLORIDE SERPL-SCNC: 103 MMOL/L (ref 98–107)
CO2 SERPL-SCNC: 24 MMOL/L (ref 22–29)
CREAT SERPL-MCNC: 0.36 MG/DL (ref 0.57–1)
DEPRECATED RDW RBC AUTO: 51.2 FL (ref 37–54)
EGFRCR SERPLBLD CKD-EPI 2021: 127 ML/MIN/1.73
EOSINOPHIL # BLD AUTO: 0.3 10*3/MM3 (ref 0–0.4)
EOSINOPHIL NFR BLD AUTO: 3.2 % (ref 0.3–6.2)
ERYTHROCYTE [DISTWIDTH] IN BLOOD BY AUTOMATED COUNT: 14.6 % (ref 12.3–15.4)
GLOBULIN UR ELPH-MCNC: 3.2 GM/DL
GLUCOSE BLDC GLUCOMTR-MCNC: 120 MG/DL (ref 70–130)
GLUCOSE BLDC GLUCOMTR-MCNC: 125 MG/DL (ref 70–130)
GLUCOSE BLDC GLUCOMTR-MCNC: 136 MG/DL (ref 70–130)
GLUCOSE SERPL-MCNC: 126 MG/DL (ref 65–99)
HCT VFR BLD AUTO: 33.8 % (ref 34–46.6)
HGB BLD-MCNC: 11.2 G/DL (ref 12–15.9)
IMM GRANULOCYTES # BLD AUTO: 0.05 10*3/MM3 (ref 0–0.05)
IMM GRANULOCYTES NFR BLD AUTO: 0.5 % (ref 0–0.5)
LYMPHOCYTES # BLD AUTO: 1.43 10*3/MM3 (ref 0.7–3.1)
LYMPHOCYTES NFR BLD AUTO: 15.4 % (ref 19.6–45.3)
MCH RBC QN AUTO: 31.8 PG (ref 26.6–33)
MCHC RBC AUTO-ENTMCNC: 33.1 G/DL (ref 31.5–35.7)
MCV RBC AUTO: 96 FL (ref 79–97)
MONOCYTES # BLD AUTO: 0.62 10*3/MM3 (ref 0.1–0.9)
MONOCYTES NFR BLD AUTO: 6.7 % (ref 5–12)
NEUTROPHILS NFR BLD AUTO: 6.86 10*3/MM3 (ref 1.7–7)
NEUTROPHILS NFR BLD AUTO: 73.8 % (ref 42.7–76)
NRBC BLD AUTO-RTO: 0 /100 WBC (ref 0–0.2)
PLATELET # BLD AUTO: 267 10*3/MM3 (ref 140–450)
PMV BLD AUTO: 10.8 FL (ref 6–12)
POTASSIUM SERPL-SCNC: 4.3 MMOL/L (ref 3.5–5.2)
PROT SERPL-MCNC: 7.1 G/DL (ref 6–8.5)
RBC # BLD AUTO: 3.52 10*6/MM3 (ref 3.77–5.28)
SODIUM SERPL-SCNC: 139 MMOL/L (ref 136–145)
WBC NRBC COR # BLD AUTO: 9.3 10*3/MM3 (ref 3.4–10.8)

## 2024-02-19 PROCEDURE — 85025 COMPLETE CBC W/AUTO DIFF WBC: CPT | Performed by: HOSPITALIST

## 2024-02-19 PROCEDURE — 80053 COMPREHEN METABOLIC PANEL: CPT | Performed by: HOSPITALIST

## 2024-02-19 PROCEDURE — 25010000002 HEPARIN (PORCINE) PER 1000 UNITS: Performed by: FAMILY MEDICINE

## 2024-02-19 PROCEDURE — 82948 REAGENT STRIP/BLOOD GLUCOSE: CPT

## 2024-02-19 PROCEDURE — 94799 UNLISTED PULMONARY SVC/PX: CPT

## 2024-02-19 RX ORDER — HYDROCODONE BITARTRATE AND ACETAMINOPHEN 5; 325 MG/1; MG/1
1 TABLET ORAL EVERY 6 HOURS PRN
Qty: 12 TABLET | Refills: 0 | Status: SHIPPED | OUTPATIENT
Start: 2024-02-19 | End: 2024-02-21

## 2024-02-19 RX ADMIN — HYDROCODONE BITARTRATE AND ACETAMINOPHEN 1 TABLET: 5; 325 TABLET ORAL at 15:38

## 2024-02-19 RX ADMIN — Medication 1 TABLET: at 08:11

## 2024-02-19 RX ADMIN — HEPARIN SODIUM 5000 UNITS: 5000 INJECTION INTRAVENOUS; SUBCUTANEOUS at 08:11

## 2024-02-19 RX ADMIN — Medication 250 MG: at 08:11

## 2024-02-19 RX ADMIN — BACLOFEN 10 MG: 10 TABLET ORAL at 15:36

## 2024-02-19 RX ADMIN — HYDROCODONE BITARTRATE AND ACETAMINOPHEN 1 TABLET: 5; 325 TABLET ORAL at 09:49

## 2024-02-19 RX ADMIN — BACLOFEN 10 MG: 10 TABLET ORAL at 04:02

## 2024-02-19 RX ADMIN — FAMOTIDINE 20 MG: 10 INJECTION INTRAVENOUS at 08:11

## 2024-02-19 RX ADMIN — Medication 10 ML: at 08:11

## 2024-02-19 RX ADMIN — BACLOFEN 10 MG: 10 TABLET ORAL at 08:58

## 2024-02-19 RX ADMIN — METOPROLOL TARTRATE 50 MG: 50 TABLET, FILM COATED ORAL at 08:11

## 2024-02-19 NOTE — PROGRESS NOTES
[facility] Medicine Services  INPATIENT PROGRESS NOTE    Patient Name: Namita Zabala  Date of Admission: 2/7/2024  Today's Date: 02/19/24  Length of Stay: 12  Primary Care Physician: David Lara MD    Subjective   Chief Complaint: Fevers  Addendum    Fever        48-year-old female with history of anoxic brain injury, spastic quadriplegia, percutaneous endoscopic gastrostomy tube placement, severe malnutrition, frequent urinary tract infections with secondary sepsis, was admitted to the hospital December 2023 and treated with broad-spectrum antibiotics; history of mitral valve prolapse, nephrolithiasis, with a left percutaneous nephroureteral drain in place, last exchanged documented on January 2023.      Patient came from nursing [facility]; as per family member patient has had a malfunctioning percutaneous gastrostomy tube for several days, and approximately 3 days ago developed fevers.  There is no other information available at this time.  Not able to provide any history due to neurological condition.    Tracheostomy  On medical floor  No fevers reported  No other events reported  Unable to obtain labs today  2/15  history of anoxic brain injury, spastic quadriplegia, percutaneous endoscopic gastrostomy tube placement, severe malnutrition, frequent urinary tract infections with secondary sepsis, was admitted to the hospital December 2023 and treated with broad-spectrum antibiotics; history of mitral valve prolapse, nephrolithiasis, with a left percutaneous nephroureteral drain in place, last exchanged documented on January 2023.      Patient came from [facility]; as per family member patient has had a malfunctioning percutaneous gastrostomy tube for several days, and approximately 3 days ago developed fevers.   Initiated infectious disease patient was admitted with sepsis assumed from complicated UTI with chronic indwelling Mojica catheter  and left nephroureteral stent. Left nephroureteral stent overdue for exchange.-Per JOSE Salazar W note.  Now working on outpatient [facility] [place] patient does have a history of ESBL Pseudomonas and Proteus infection she was taken off meropenem Per ID  2/16  Appreciate palliative care patient does have very poor quality of life and very poor prognosis remains DNR , patient and family seem to have very poor insight to her overall poor prognosis, remains afebrile off antibiotics white count unremarkable ID has signed off patient has an appointment as outpatient for left ureteral stent replacement at [place] Would expect urology to order periprocedure antibiotics when patient gets her left nephroureteral stent exchanged,therefore no additional antibiotics are being ordered while awaiting that procedure, patient currently is requiring 8 L oxygen suspect this is much higher than her baseline I will keep her today do chest x-ray and titrate down her oxygen accordingly  2/17  Oxygenation is down to 6 L, chest x-ray showing right lower lobe atelectasis moderate gaseous extension of the stomach  2/18  Remains on 6 L I spoke to nursing staff yesterday to verify what her baseline and to titrate oxygen to keep pulse ox around 90%  2/19  Spoke to nursing staff apparently she has been on room air since yesterday we will go ahead discharge back to nursing home   systems have been reviewed and are negative unless otherwise stated.     Objective    Temp:  [98.4 °F (36.9 °C)-99.5 °F (37.5 °C)] 98.4 °F (36.9 °C)  Heart Rate:  [] 83  Resp:  [16-18] 16  BP: ()/(50-58) 106/58  Physical Exam  Constitutional:       Appearance: Chronically ill-appearing.  Multiple contractures.  HENT:      Head: Normocephalic and atraumatic.      Nose: Nose normal.      Mouth/Throat:      Mouth: Mucous membranes are dry.     Pharynx: Able to evaluate  Eyes:      Extraocular Movements: Extraocular movements preserved      Conjunctiva/sclera: Conjunctivae normal.      Pupils: Pupils are equal, round, and reactive to light.   Cardiovascular:      Rate and Rhythm: Tachycardic, normal rate and regular rhythm.      Pulses: Fast pulse  Pulmonary:      Effort: No tachypnea.     Breath sounds: Reduced breath sounds on the right due to positioning.  Abdominal:      General: Abdominal scar extensive, with no open wounds; there is a percutaneous gastrostomy tube in place.  Abdomen is nondistended.  Bowel sounds are diminished.  Genitourinary:  nephrostomy tube on the left.  Mojica catheter in place.     Palpations: Abdomen is soft.      Tenderness: There is no guarding or rebound.   Musculoskeletal:         General: Multiple upper and lower extremity flexion contractures; decreased range of motion.    Extremities: Multiple contractures as per musculoskeletal exam; no lower extremity edema.  Skin:     Capillary Refill: Capillary refill takes less than 2 seconds.      Coloration: Skin is not jaundiced.      Findings: No rash.   Neurological:      General: Spastic quadriplegia.  I am not able to assess patient's orientation or memory.  Speech:  with aphasia.   Psychiatric evaluation not able to be performed.      Results Review:  I have reviewed the labs, radiology results, and diagnostic studies.    Laboratory Data:   Results from last 7 days   Lab Units 02/19/24  0549 02/18/24  0815 02/14/24  0951   WBC 10*3/mm3 9.30 9.67 7.78   HEMOGLOBIN g/dL 11.2* 10.6* 10.9*   HEMATOCRIT % 33.8* 31.3* 34.2   PLATELETS 10*3/mm3 267 223 196        Results from last 7 days   Lab Units 02/19/24  0549 02/18/24  0814 02/14/24  0951   SODIUM mmol/L 139 138 139   POTASSIUM mmol/L 4.3 5.3* 3.9   CHLORIDE mmol/L 103 102 103   CO2 mmol/L 24.0 22.0 25.0   BUN mg/dL 25* 23* 33*   CREATININE mg/dL 0.36* 0.35* 0.40*   CALCIUM mg/dL 10.2 9.8 9.9   BILIRUBIN mg/dL 0.5 0.6  --    ALK PHOS U/L 72 69  --    ALT (SGPT) U/L 37* 40*  --    AST (SGOT) U/L 17 23  --    GLUCOSE mg/dL  126* 123* 134*       Culture Data:   Blood Culture   Date Value Ref Range Status   02/07/2024 Abnormal Stain (C)  Preliminary       Radiology Data:   Imaging Results (Last 24 Hours)       ** No results found for the last 24 hours. **            I have reviewed the patient's current medications.     Assessment/Plan   Assessment  Active Hospital Problems    Diagnosis     History of anoxic brain injury     Infection associated with nephrostomy catheter     Functional quadriplegia     Dysphagia     Severe malnutrition     Complicated UTI (urinary tract infection)      Severe sepsis, resolved  Recurrent urinary tract infection, complicated, associated to nephrostomy tube/indwelling catheter/ureteral stent  Status post replacement of 18 Fr percutaneous endoscopic gastrostomy tube 2/08/24  Hypernatremia, resolved  Mild hypokalemia, resolved  History of anoxic brain injury 2022  Chronic left nephrostomy tube in place  Spastic quadriplegia  History of pulmonary embolism in 2021  History of COVID-19 in 2021             CT scan report  1. A left-sided nephrostomy tube and ureteral stent remains in place and  well-positioned. There is mild progressive distention of the upper  tracts of the left kidney as well as of the proximal and mid left ureter  when compared to the previous exam. There is associated pyeloureteritis  with urothelial thickening and stranding surrounding the left renal  pelvis and proximal ureter. Dysfunction of the indwelling stent is  suspected. The right kidney demonstrates nonobstructing nephrolithiasis.  No right-sided ureteral stone is present.  2. Large volume of stool within the colon. No fecal impaction within the  rectal vault. The stomach is moderately distended with air and fluid  with an air-fluid level but without definite gastric wall thickening or  convincing evidence of gastric outlet obstruction. No evidence of  mechanical obstruction.  3. Just to the left of midline at the umbilicus there is  a small  abdominal wall defect. Slight protrusion of a portion of the transverse  colon into this defect is present without incarceration or obstruction.  4. The uterus and adnexa are unremarkable.      Results of nuclear medicine test reviewed  1. Symmetric flow to the kidneys. Differential function is 53.4%  left-sided and 46.6% right-sided.  2. Normal excretion curve for the right kidney with a half-emptying time  of 12.8 minutes. This is mildly accentuated following the administration  of IV Lasix.  3. Hydronephrotic left kidney with progressive accumulation of activity  within the renal collecting system until the administration of IV Lasix.  Following the administration of 40 mg of Lasix intravenously there is  rapid excretion of activity from the upper tracts of the left kidney  with a half-emptying time of less than 6 minutes.       -No fevers  -Urine culture 1/29/24: E. coli ESBL sensitive to ertapenem and meropenem  Proteus mirabilis resistant to quinolones and nitrofurantoin  Pseudomonas resistant to Levaquin.  -Blood culture 2/7/24: staph coag negative x 1, probable contaminant  -Urine culture 2/7/24: mixed oscar    Left nephroureteral tube drainage 150+75 ml, reported    Prior records reviewed: Last nephroureteral exchange performed January 4, 2023 at [facility] interventional radiology.      Treatment Plan    Treated with meropenem IV, 02/07 to 02/11.    S/P percutaneous gastrostomy tube exchange.  Continue enteral feedings, per nutrition recommendations.     Mojica catheter exchanged  2/8/2024.      Dr. Rey texted me back 2/13/24 and agreed with plan.  She believes patient may no longer need the nephroureteral stent but will need to see if the ureter looks normal as it had a leak in the past..  An antegrade nephrostogram would be required.  If the ureter is normal, the stent could be removed or capped for safety removal in 1 to 2 weeks.  This will be scheduled outpatient; attempt to schedule at  [place] or  in process.      Heparin subcutaneous for DVT prophylaxis.  Prognosis is poor given functional status.      Medical Decision Making  Number and Complexity of problems: 10, high complexity  Differential Diagnosis: See above    Conditions and Status        Condition is improving.     [facility] Data  External documents reviewed: None  Cardiac tracing (EKG, telemetry) interpretation: Reviewed, sinus tachycardia  Radiology interpretation: Radiology reports reviewed  Labs reviewed: Yes  Any tests that were considered but not ordered: No     Decision rules/scores evaluated (example [id number], Wells, etc): None     Discussed with: Nurse staff and specialists     Care Planning  Shared decision making: With family  Code status and discussions: No chest compressions.    Disposition  Social Determinants of Health that impact treatment or disposition: Nursing home.  Bedbound.  I expect the patient to be transferred for nephroureteral stent exchange.    Electronically signed by Farhat Chanel MD, 02/19/24, 08:34 CST.         Electronically signed by Tae Charles MD, 02/13/24, 4:15 PM CST.

## 2024-02-19 NOTE — DISCHARGE SUMMARY
[facility]  DISCHARGE SUMMARY       Date of Admission: 2/7/2024  Date of Discharge:  2/19/2024  Primary Care Physician: David Lara MD    Presenting Problem/History of Present Illness:    48-year-old female with history of anoxic brain injury, spastic quadriplegia, percutaneous endoscopic gastrostomy tube placement, severe malnutrition, frequent urinary tract infections with secondary sepsis, was admitted to the hospital December 2023 and treated with broad-spectrum antibiotics; history of mitral valve prolapse, nephrolithiasis, with a left percutaneous nephroureteral drain in place, last exchanged documented on January 2023.      Patient came from [facility]; as per family member patient has had a malfunctioning percutaneous gastrostomy tube for several days, and approximately 3 days ago developed fevers.  There is no other information available at this time.  Not able to provide any history due to neurological condition.    Tracheostomy  On medical floor  No fevers reported  No other events reported  Unable to obtain labs today  2/15  history of anoxic brain injury, spastic quadriplegia, percutaneous endoscopic gastrostomy tube placement, severe malnutrition, frequent urinary tract infections with secondary sepsis, was admitted to the hospital December 2023 and treated with broad-spectrum antibiotics; history of mitral valve prolapse, nephrolithiasis, with a left percutaneous nephroureteral drain in place, last exchanged documented on January 2023.      Patient came from [facility]; as per family member patient has had a malfunctioning percutaneous gastrostomy tube for several days, and approximately 3 days ago developed fevers.   Initiated infectious disease patient was admitted with sepsis assumed from complicated UTI with chronic indwelling Mojica catheter and left nephroureteral stent. Left nephroureteral stent  overdue for exchange.-Per JOSE Salazar note.  Now working on outpatient [facility] appointmen patient does have a history of ESBL Pseudomonas and Proteus infection she was taken off meropenem Per ID  2/16  Appreciate palliative care patient does have very poor quality of life and very poor prognosis remains DNR , patient and family seem to have very poor insight to her overall poor prognosis, remains afebrile off antibiotics white count unremarkable ID has signed off patient has an appointment as outpatient for left ureteral stent replacement at [facility] Would expect urology to order periprocedure antibiotics when patient gets her left nephroureteral stent exchanged,therefore no additional antibiotics are being ordered while awaiting that procedure, patient currently is requiring 8 L oxygen suspect this is much higher than her baseline I will keep her today do chest x-ray and titrate down her oxygen accordingly  2/17  Oxygenation is down to 6 L, chest x-ray showing right lower lobe atelectasis moderate gaseous extension of the stomach  2/18  Remains on 6 L I spoke to nursing staff yesterday to verify what her baseline and to titrate oxygen to keep pulse ox around 90%  2/19  Spoke to nursing staff apparently she has been on room air since yesterday we will go ahead discharge back to nursing home  Final Discharge Diagnoses:  Active Hospital Problems    Diagnosis     History of anoxic brain injury     Infection associated with nephrostomy catheter     Functional quadriplegia     Dysphagia     Severe malnutrition     Complicated UTI (urinary tract infection)        Consults: GI urology infectious disease quality of care    Procedures Performed: None    Pertinent Test Results:   Results for orders placed during the hospital encounter of 08/27/21    Adult Transthoracic Echo Complete W/ Cont if Necessary Per Protocol    Interpretation Summary  · Estimated left ventricular EF = 60% Left ventricular systolic  function is normal.  · Left ventricular diastolic function is consistent with (grade I) impaired relaxation.  · The right ventricular cavity is mildly dilated. Systolic function is normal.  · The right atrial cavity is mildly dilated.  · There is mild aortic and tricuspid valve regurgitation present.  · Estimated right ventricular systolic pressure from tricuspid regurgitation is normal (<35 mmHg).      Imaging Results (All)       Procedure Component Value Units Date/Time    XR Chest 1 View [[id number]] Collected: 02/16/24 1504     Updated: 02/16/24 1507    Narrative:      EXAMINATION: XR CHEST 1 VW-  2/16/2024 3:04 PM     HISTORY: Hypoxemia     FINDINGS: Today's exam is compared to previous study of 2/7/2024. A  tracheostomy tube is in place. Right basilar atelectasis is present.  There is moderate gaseous distention of the stomach. The heart is mildly  enlarged. No acute infiltrate or effusion.       Impression:      1.. Right basilar atelectasis. Lungs are otherwise clear. No effusion or  free air.  2. Moderate gaseous distention of the stomach.     This report was signed and finalized on 2/16/2024 3:04 PM by Dr. Yuniel De Jesus MD.       NM Renal With Flow & Function With Pharmacological Intervention [[id number]] Collected: 02/13/24 1503     Updated: 02/13/24 1512    Narrative:      EXAMINATION: NM RENAL W FLOW AND FUNCTION W PHARMACOLOGICAL  INTERVENTION-  2/13/2024 3:03 PM     HISTORY: Left-sided hydronephrosis.     FINDINGS: Following intravenous injection of 11.0 mCi of Tc 99m MAG3  flow images are obtained. Split function is 53.4% left side and 46.6%  right-sided. There is symmetric uptake of the radiopharmaceutical on  flow time/activity curves.     Parenchymal phase and excretion phase imaging demonstrates rapid peak  activity within the right kidney of 5 minutes. A half-emptying time of  the right renal collecting system of 12.8 minutes is calculated. This is  mildly accentuated with the intravenous  administration of 40 mg of  Lasix.     There is progressive accumulation of activity within the collecting  system of the left kidney on time-activity curves prior to the  administration of IV Lasix. The maximum count rate within the left renal  collecting system is at 22 minutes just after the administration of the  IV Lasix. A half-emptying time of less than 6 minutes was demonstrated  following intravenous administration of Lasix.       Impression:      1. Symmetric flow to the kidneys. Differential function is 53.4%  left-sided and 46.6% right-sided.  2. Normal excretion curve for the right kidney with a half-emptying time  of 12.8 minutes. This is mildly accentuated following the administration  of IV Lasix.  3. Hydronephrotic left kidney with progressive accumulation of activity  within the renal collecting system until the administration of IV Lasix.  Following the administration of 40 mg of Lasix intravenously there is  rapid excretion of activity from the upper tracts of the left kidney  with a half-emptying time of less than 6 minutes.     This report was signed and finalized on 2/13/2024 3:09 PM by Dr. Yuniel De Jesus MD.       XR Abdomen KUB [[id number]] Collected: 02/08/24 1322     Updated: 02/08/24 1327    Narrative:      EXAM: XR ABDOMEN KUB-      DATE: 2/8/2024 12:22 PM     HISTORY: Document correct placement of PEG tube replacement;      COMPARISON: 11/27/2023.     TECHNIQUE:   Frontal view of the abdomen. 1 image.     FINDINGS:    Contrast was injected into the patient's G-tube. Contrast opacifies the  stomach. There is no abnormal leakage of contrast. Bowel gas pattern  visualized is nonspecific with borderline air-filled loops of small  bowel and probable bowel gas distally in the colon. This may represent  ileus. Left ureteral stent is partially imaged.          Impression:         1. Contrast in the stomach with no abnormal leakage.  2. Probable ileus.     This report was signed and finalized  on 2/8/2024 1:24 PM by Dominguez Barton.       CT Abdomen Pelvis Without Contrast [[id number]] Collected: 02/07/24 1720     Updated: 02/07/24 1733    Narrative:      CT ABDOMEN PELVIS WO CONTRAST- 2/7/2024 3:49 PM     HISTORY: Sepsis; R50.9-Fever, unspecified; N39.0-Urinary tract  infection, site not specified; A41.9-Sepsis, unspecified organism;  E87.0-Hyperosmolality and hypernatremia      COMPARISON: 1/29/2024     DLP: 784.68 mGy.cm . All CT scans are performed using dose optimization  techniques as appropriate to the performed exam and including at least  one of the following: Automated exposure control, adjustment of the mA  and/or kV according to size, and the use of the iterative reconstruction  technique.     TECHNIQUE: Noncontrast enhanced images of the abdomen and pelvis  obtained without oral contrast. The study is degraded by motion  artifact.     FINDINGS:  Bibasilar atelectasis is present. The base of the heart is unremarkable.  There is no pericardial effusion..     LIVER: No focal liver lesion.     BILIARY SYSTEM: The gallbladder is unremarkable. No intrahepatic or  extrahepatic ductal dilatation.     PANCREAS: No focal pancreatic lesion.     SPLEEN: Unremarkable.     KIDNEYS AND ADRENALS: The adrenals are unremarkable. Bilateral  nonobstructing nephrolithiasis is present. A left-sided nephrostomy tube  appears to be well positioned within the renal pelvis. A left-sided  ureteral stent is also in place. There is progressive distention of the  upper tracts of the left kidney and proximal left ureter in comparison  to the previous exam. There is again evidence of pyeloureteritis with  periureteral stranding and thickening of the urothelium.. The right  ureter is decompressed and normal in appearance with no evidence of  ureteral calculus..     RETROPERITONEUM: No mass, lymphadenopathy or hemorrhage.     GI TRACT: A percutaneous gastrostomy feeding tube is in place. It is  well-positioned. The stomach  is moderately distended and air-filled. No  evidence of gastric wall thickening or definite gastric outlet  obstruction. There is a large volume of stool throughout the colon which  could be contributing to stasis. A left paramedian ventral wall hernia  is noted at the umbilicus. There is slight protrusion of a portion of  the transverse colon into this defect without evidence of incarceration  or obstruction. There is increased stool within the colon to just above  the level of the rectum. No fecal impaction. No significant small bowel  distention.. The appendix is not clearly identified..     OTHER: There is no mesenteric mass, lymphadenopathy or fluid collection.  The osseous structures and soft tissues demonstrate no worrisome  lesions. No free fluid is present.     PELVIS: A Mojica catheter is in place within the bladder with the urinary  bladder decompressed.. The uterus and adnexa are unremarkable. No free  fluid in the cul-de-sac..       Impression:      1. A left-sided nephrostomy tube and ureteral stent remains in place and  well-positioned. There is mild progressive distention of the upper  tracts of the left kidney as well as of the proximal and mid left ureter  when compared to the previous exam. There is associated pyeloureteritis  with urothelial thickening and stranding surrounding the left renal  pelvis and proximal ureter. Dysfunction of the indwelling stent is  suspected. The right kidney demonstrates nonobstructing nephrolithiasis.  No right-sided ureteral stone is present.  2. Large volume of stool within the colon. No fecal impaction within the  rectal vault. The stomach is moderately distended with air and fluid  with an air-fluid level but without definite gastric wall thickening or  convincing evidence of gastric outlet obstruction. No evidence of  mechanical obstruction.  3. Just to the left of midline at the umbilicus there is a small  abdominal wall defect. Slight protrusion of a portion  of the transverse  colon into this defect is present without incarceration or obstruction.  4. The uterus and adnexa are unremarkable..           This report was signed and finalized on 2/7/2024 5:30 PM by Dr. Yuniel De Jesus MD.       XR Chest 1 View [[id number]] Collected: 02/07/24 1244     Updated: 02/07/24 1250    Narrative:      EXAMINATION: XR CHEST 1 VW-  2/7/2024 12:44 PM 1 view     HISTORY: fever     COMPARISON: 1/29/2024     TECHNIQUE: A single frontal view of the chest was obtained.     FINDINGS:  This examination is limited by positioning of the patient's head over  the RIGHT upper lung/chest. This significantly limits evaluation of the  upper half of the RIGHT lung. The remainder of the lungs are clear. The  heart is not enlarged. A tracheostomy tube is in place. Some gaseous  distention of loops of bowel in the upper abdomen       Impression:         1.  Exam limited by patient positioning as the patient's head obscures  much of the RIGHT hemithorax. The remaining visualized portion of the  lungs are clear.          2.  There are gaseous distended loops of bowel in the upper abdomen.           This report was signed and finalized on 2/7/2024 12:47 PM by Dr. Taj Bergeron MD.             LAB RESULTS:      Lab 02/19/24  0549 02/18/24  0815 02/14/24  0951   WBC 9.30 9.67 7.78   HEMOGLOBIN 11.2* 10.6* 10.9*   HEMATOCRIT 33.8* 31.3* 34.2   PLATELETS 267 223 196   NEUTROS ABS 6.86 6.71 5.19   IMMATURE GRANS (ABS) 0.05 0.10*  --    LYMPHS ABS 1.43 1.66  --    MONOS ABS 0.62 0.76  --    EOS ABS 0.30 0.38 0.40   MCV 96.0 93.7 97.7*         Lab 02/19/24  0549 02/18/24  0814 02/14/24  0951 02/13/24  1421   SODIUM 139 138 139 141   POTASSIUM 4.3 5.3* 3.9 4.0   CHLORIDE 103 102 103 103   CO2 24.0 22.0 25.0 27.0   ANION GAP 12.0 14.0 11.0 11.0   BUN 25* 23* 33* 31*   CREATININE 0.36* 0.35* 0.40* 0.41*   EGFR 127.0 127.8 123.8 123.1   GLUCOSE 126* 123* 134* 127*   CALCIUM 10.2 9.8 9.9 10.0         Lab  02/19/24  0549 02/18/24  0814   TOTAL PROTEIN 7.1 6.8   ALBUMIN 3.9 3.8   GLOBULIN 3.2 3.0   ALT (SGPT) 37* 40*   AST (SGOT) 17 23   BILIRUBIN 0.5 0.6   ALK PHOS 72 69                     Brief Urine Lab Results  (Last result in the past 365 days)        Color   Clarity   Blood   Leuk Est   Nitrite   Protein   CREAT   Urine HCG        02/08/24 0910 Yellow   Turbid   Large (3+)   Large (3+)   Positive   >=300 mg/dL (3+)                 Microbiology Results (last 10 days)       ** No results found for the last 240 hours. **            Hospital Course:   48-year-old female with history of anoxic brain injury, spastic quadriplegia, percutaneous endoscopic gastrostomy tube placement, severe malnutrition, frequent urinary tract infections with secondary sepsis, was admitted to the hospital December 2023 and treated with broad-spectrum antibiotics; history of mitral valve prolapse, nephrolithiasis, with a left percutaneous nephroureteral drain in place, last exchanged documented on January 2023.      Patient came from [facility]; as per family member patient has had a malfunctioning percutaneous gastrostomy tube for several days, and approximately 3 days ago developed fevers.  There is no other information available at this time.  Not able to provide any history due to neurological condition.    Tracheostomy  On medical floor  No fevers reported  No other events reported  Unable to obtain labs today  2/15  history of anoxic brain injury, spastic quadriplegia, percutaneous endoscopic gastrostomy tube placement, severe malnutrition, frequent urinary tract infections with secondary sepsis, was admitted to the hospital December 2023 and treated with broad-spectrum antibiotics; history of mitral valve prolapse, nephrolithiasis, with a left percutaneous nephroureteral drain in place, last exchanged documented on January 2023.      Patient came from [facility]; as per family member patient has had a  "malfunctioning percutaneous gastrostomy tube for several days, and approximately 3 days ago developed fevers.   Initiated infectious disease patient was admitted with sepsis assumed from complicated UTI with chronic indwelling Mojica catheter and left nephroureteral stent. Left nephroureteral stent overdue for exchange.-Per JOSE Salazar note.  Now working on outpatient [facility] appointmen patient does have a history of ESBL Pseudomonas and Proteus infection she was taken off meropenem Per ID  2/16  Appreciate palliative care patient does have very poor quality of life and very poor prognosis remains DNR , patient and family seem to have very poor insight to her overall poor prognosis, remains afebrile off antibiotics white count unremarkable ID has signed off patient has an appointment as outpatient for left ureteral stent replacement at [place] Would expect urology to order periprocedure antibiotics when patient gets her left nephroureteral stent exchanged,therefore no additional antibiotics are being ordered while awaiting that procedure, patient currently is requiring 8 L oxygen suspect this is much higher than her baseline I will keep her today do chest x-ray and titrate down her oxygen accordingly  2/17  Oxygenation is down to 6 L, chest x-ray showing right lower lobe atelectasis moderate gaseous extension of the stomach  2/18  Remains on 6 L I spoke to nursing staff yesterday to verify what her baseline and to titrate oxygen to keep pulse ox around 90%  2/19  Spoke to nursing staff apparently she has been on room air since yesterday we will go ahead discharge back to nursing home    Physical Exam on Discharge:  /58 (BP Location: Right leg, Patient Position: Lying)   Pulse 78   Temp 98.4 °F (36.9 °C)   Resp 16   Ht 152.4 cm (60\")   Wt 56 kg (123 lb 7.3 oz)   LMP  (LMP Unknown)   SpO2 97%   BMI 24.11 kg/m²   Physical Exam  Constitutional:       Comments: Contracted trach and a PEG " minimally responsive   Cardiovascular:      Rate and Rhythm: Normal rate.   Pulmonary:      Effort: Respiratory distress present.   Abdominal:      General: Abdomen is flat.      Palpations: Abdomen is soft.           Condition on Discharge: Stable    Discharge Disposition:  Skilled Nursing Facility (DC - External)    Discharge Medications:     Discharge Medications        Continue These Medications        Instructions Start Date   acetaminophen 500 MG tablet  Commonly known as: TYLENOL   500 mg, Per G Tube, Every 4 Hours PRN, Not to exceed 3000mg from all sources daily       baclofen 20 MG tablet  Commonly known as: LIORESAL   20 mg, Per G Tube, Every 6 Hours      bisacodyl 10 MG suppository  Commonly known as: DULCOLAX   10 mg, Rectal, Every 48 Hours PRN      chlorhexidine 0.12 % solution  Commonly known as: PERIDEX   15 mL, Mouth/Throat, 2 Times Daily, 15 ml using oral swab twice daily for oral care      fleet enema 7-19 GM/118ML enema   1 enema, Rectal, Every 48 Hours PRN      guaifenesin 100 MG/5ML liquid  Commonly known as: ROBITUSSIN   200 mg, Oral, 2 Times Daily      hydrOXYzine 25 MG tablet  Commonly known as: ATARAX   25 mg, Per G Tube, Every 6 Hours PRN      liver oil-zinc oxide 40 % ointment  Commonly known as: DESITIN   1 application , Topical, Every 4 Hours PRN      metoprolol tartrate 50 MG tablet  Commonly known as: LOPRESSOR   50 mg, Oral, 2 Times Daily      miconazole 2 % powder  Commonly known as: MICOTIN   1 Application, Topical, As Needed      multivitamin with minerals tablet tablet   1 tablet, Oral, Daily      nystatin 697209 UNIT/GM powder  Commonly known as: MYCOSTATIN   1 Application, Topical, 2 Times Daily      ondansetron 4 MG/5ML solution  Commonly known as: ZOFRAN   4 mg, Oral, Every 6 Hours PRN      pantoprazole 40 MG EC tablet  Commonly known as: PROTONIX   40 mg, Oral, 2 Times Daily      polyethylene glycol 17 GM/SCOOP powder  Commonly known as: MIRALAX   17 g, Per G Tube, Every  Morning      Refresh Optive 0.5-0.9 % solution  Generic drug: Carboxymethylcellul-Glycerin   1 drop, Ophthalmic, 2 Times Daily, Bilateral eyes      Renacidin solution   60 mL, Irrigation, 2 Times Daily      rosuvastatin 5 MG tablet  Commonly known as: CRESTOR   5 mg, Per G Tube, Daily      Scopolamine 1 MG/3DAYS patch   1 patch, Transdermal, Every 72 Hours      sennosides-docusate 8.6-50 MG per tablet  Commonly known as: PERICOLACE   1 tablet, Oral, 2 Times Daily      simethicone 40 MG/0.6ML drops  Commonly known as: MYLICON   20 mg, Per G Tube, Every 6 Hours      sodium chloride 0.9 % irrigation  Commonly known as: NS   10 mL, Irrigation, Every 8 Hours, Irrigation for secretions: Hydration      tiZANidine 4 MG tablet  Commonly known as: ZANAFLEX   8 mg, Oral, Every 8 Hours PRN             Stop These Medications      cephalexin 500 MG capsule  Commonly known as: KEFLEX     thiamine 100 MG tablet  tablet  Commonly known as: VITAMIN B-1            ASK your doctor about these medications        Instructions Start Date   HYDROcodone-acetaminophen 5-325 MG per tablet  Commonly known as: NORCO   1 tablet, Oral, Every 6 Hours PRN                   Discharge Diet:     Activity at Discharge:     Follow-up Appointments:   No future appointments.    Test Results Pending at Discharge: None    Electronically signed by Farhat Chanel MD, 02/19/24, 10:48 CST.    Time: 3 5 minutes.

## 2024-02-19 NOTE — PLAN OF CARE
Goal Outcome Evaluation:  Plan of Care Reviewed With: spouse, caregiver        Progress: no change  Outcome Evaluation: Alert, unable to speak. Trach in place, on room air and . Contracted in BUE/BLE, turns q2, pillows and wedges in use. Peg tube in place, tube feedings at 45mL/hr and 35mL flush q 1hr, tolerating well. Mojica catheter and Left nephrostomy tube intact. Medicated x1 with PRN pain med. Heparin for VTE prevention. VSS. Bed alarm set, call light in reach. D/c back to facility today, afebrile.  notified and aware.

## 2024-02-19 NOTE — CASE MANAGEMENT/SOCIAL WORK
Continued Stay Note  [facility]     Patient Name: Namita Zabala  MRN: [id number]  Today's Date: 2/19/2024    Admit Date: 2/7/2024    Plan: [facility]   Discharge Plan       Row Name 02/19/24 1331       Plan    Plan [facility]    Patient/Family in Agreement with Plan yes    Final Note [phone]. They will pull the d/c summary from [facility]. Esequiel appointment number given to Awilda ([facility] liaison) so they can follow up with pt's appointment. Pt will go by [facility] [phone].                   Discharge Codes    No documentation.                 Expected Discharge Date and Time       Expected Discharge Date Expected Discharge Time    Feb 19, 2024               ORAL Mcintyre

## 2024-02-19 NOTE — PLAN OF CARE
Goal Outcome Evaluation:  Plan of Care Reviewed With: patient        Progress: no change   Rested well this shift, remains contracted and nonverbal. Pt medicated x1 for pain dt grimacing. PEG peptamen 1.5 @45 mL/hr 35 flush. L Nephrostomy tube draining, stratton output adequate. VSS, safetly maintained, heparin VTE prevention. Turned q 2 hrs.  Safety maintained.

## 2024-02-23 ENCOUNTER — HOSPITAL ENCOUNTER (EMERGENCY)
Facility: HOSPITAL | Age: 49
Discharge: HOME | End: 2024-02-24
Attending: EMERGENCY MEDICINE
Payer: MEDICARE

## 2024-02-23 DIAGNOSIS — Z71.1 FEARED COMPLAINT WITHOUT DIAGNOSIS: Primary | ICD-10-CM

## 2024-02-23 PROCEDURE — 99283 EMERGENCY DEPT VISIT LOW MDM: CPT

## 2024-02-24 VITALS
HEIGHT: 60 IN | TEMPERATURE: 98 F | HEART RATE: 56 BPM | WEIGHT: 129.6 LBS | RESPIRATION RATE: 18 BRPM | SYSTOLIC BLOOD PRESSURE: 91 MMHG | OXYGEN SATURATION: 96 % | DIASTOLIC BLOOD PRESSURE: 49 MMHG | BODY MASS INDEX: 25.45 KG/M2

## 2024-02-24 NOTE — ED PROVIDER NOTES
Subjective   History of Present Illness  Pt sent to the EC from [facility] with report her trach had become dislodged.  Trach in place but is approx 1cm out from tracheostomy site.  No bleeding/drainage.         Review of Systems   Reason unable to perform ROS: Pt does not provide hx.       Past Medical History:   Diagnosis Date    Acute kidney failure, unspecified     Angina at rest     Anoxic brain damage, not elsewhere classified     Chronic pulmonary embolism     Chronic respiratory failure, unspecified whether with hypoxia or hypercapnia     Dependence on respirator (ventilator) status     Gastrointestinal hemorrhage, unspecified     Hypercalcemia     Iron deficiency anemia     Metabolic encephalopathy     Migraine     Sees Pain Management for injections.     MVP (mitral valve prolapse)     Other dysphagia     Other pulmonary embolism with acute cor pulmonale     Renal disorder     Ruptured bladder     Syncope     Urinary tract infection, site not specified        Allergies   Allergen Reactions    Coconut Unknown - High Severity    Levaquin [Levofloxacin] Other (See Comments)     unknown    Nuts Unknown - High Severity    Penicillins Rash     Patient's father noted patient had taken penicillin, many years ago, many times and then started developing a rash when she would take it.  She has received cefepime, ceftriaxone and cephalexin with no known adverse problems    Turkey Other (See Comments)     Causes migraines per pt reports       Past Surgical History:   Procedure Laterality Date    ABDOMINAL SURGERY      BREAST BIOPSY Right 2011    benign    GTUBE INSERTION      INSERTION HEMODIALYSIS CATHETER Left 09/16/2021    Procedure: HEMODIALYSIS CATHETER INSERTION;  Surgeon: Anders Kaur MD;  Location: [place] [address];  Service: Vascular;  Laterality: Left;    TONSILLECTOMY      TRACHEOSTOMY         Family History   Problem Relation Age of Onset    Colon cancer Maternal Aunt 52    Fibromyalgia  Mother     Heart disease Mother     Hypertension Mother     Hodgkin's lymphoma Paternal Grandmother     Ovarian cancer Neg Hx     Breast cancer Neg Hx        Social History     Socioeconomic History    Marital status:    Tobacco Use    Smoking status: Never    Smokeless tobacco: Never   Vaping Use    Vaping Use: Never used   Substance and Sexual Activity    Alcohol use: No    Drug use: No           Objective   Physical Exam  Vitals and nursing note reviewed.   Constitutional:       Appearance: Normal appearance.   HENT:      Head: Normocephalic.   Neck:      Comments: Tracheostomy site clean.  Tracheostomy tube in place - outer ring approx 1cm away from neck.  No drianage.   Cardiovascular:      Rate and Rhythm: Normal rate and regular rhythm.      Pulses: Normal pulses.      Heart sounds: Normal heart sounds.   Pulmonary:      Effort: Pulmonary effort is normal.      Breath sounds: Normal breath sounds.   Skin:     General: Skin is warm and dry.   Neurological:      Mental Status: She is alert.         Procedures           ED Course                                             Medical Decision Making  Pt stable in EC - trach in place.  RT checked/adjusted.  Will d/c to return to NH        Final diagnoses:   Feared complaint without diagnosis       ED Disposition  ED Disposition       ED Disposition   Discharge    Condition   Stable    Comment   --               David Lara MD  [address] [place] [address] [address] [place] [id number]  [phone]               Medication List      No changes were made to your prescriptions during this visit.            Jean-Claude Golden, DO  02/23/24 5254       Jean-Claude Golden, DO  02/23/24 5355

## 2024-03-08 ENCOUNTER — HOSPITAL ENCOUNTER (EMERGENCY)
Facility: HOSPITAL | Age: 49
Discharge: HOME | End: 2024-03-09
Attending: EMERGENCY MEDICINE
Payer: MEDICARE

## 2024-03-08 ENCOUNTER — APPOINTMENT (OUTPATIENT)
Dept: CT IMAGING | Facility: HOSPITAL | Age: 49
End: 2024-03-08
Payer: MEDICARE

## 2024-03-08 DIAGNOSIS — T83.022A NEPHROSTOMY TUBE DISPLACED: Primary | ICD-10-CM

## 2024-03-08 LAB
ALBUMIN SERPL-MCNC: 3.9 G/DL (ref 3.5–5.2)
ALBUMIN/GLOB SERPL: 1.2 G/DL
ALP SERPL-CCNC: 67 U/L (ref 39–117)
ALT SERPL W P-5'-P-CCNC: 10 U/L (ref 1–33)
ANION GAP SERPL CALCULATED.3IONS-SCNC: 11 MMOL/L (ref 5–15)
AST SERPL-CCNC: 11 U/L (ref 1–32)
BASOPHILS # BLD AUTO: 0.06 10*3/MM3 (ref 0–0.2)
BASOPHILS NFR BLD AUTO: 0.6 % (ref 0–1.5)
BILIRUB SERPL-MCNC: 0.4 MG/DL (ref 0–1.2)
BUN SERPL-MCNC: 33 MG/DL (ref 6–20)
BUN/CREAT SERPL: 89.2 (ref 7–25)
CALCIUM SPEC-SCNC: 9.8 MG/DL (ref 8.6–10.5)
CHLORIDE SERPL-SCNC: 103 MMOL/L (ref 98–107)
CO2 SERPL-SCNC: 26 MMOL/L (ref 22–29)
CREAT SERPL-MCNC: 0.37 MG/DL (ref 0.57–1)
DEPRECATED RDW RBC AUTO: 45.6 FL (ref 37–54)
EGFRCR SERPLBLD CKD-EPI 2021: 126.1 ML/MIN/1.73
EOSINOPHIL # BLD AUTO: 0.37 10*3/MM3 (ref 0–0.4)
EOSINOPHIL NFR BLD AUTO: 3.8 % (ref 0.3–6.2)
ERYTHROCYTE [DISTWIDTH] IN BLOOD BY AUTOMATED COUNT: 12.9 % (ref 12.3–15.4)
GLOBULIN UR ELPH-MCNC: 3.3 GM/DL
GLUCOSE SERPL-MCNC: 99 MG/DL (ref 65–99)
HCG SERPL QL: NEGATIVE
HCT VFR BLD AUTO: 33.7 % (ref 34–46.6)
HGB BLD-MCNC: 10.8 G/DL (ref 12–15.9)
IMM GRANULOCYTES # BLD AUTO: 0.02 10*3/MM3 (ref 0–0.05)
IMM GRANULOCYTES NFR BLD AUTO: 0.2 % (ref 0–0.5)
LYMPHOCYTES # BLD AUTO: 2.21 10*3/MM3 (ref 0.7–3.1)
LYMPHOCYTES NFR BLD AUTO: 22.9 % (ref 19.6–45.3)
MCH RBC QN AUTO: 31.1 PG (ref 26.6–33)
MCHC RBC AUTO-ENTMCNC: 32 G/DL (ref 31.5–35.7)
MCV RBC AUTO: 97.1 FL (ref 79–97)
MONOCYTES # BLD AUTO: 0.77 10*3/MM3 (ref 0.1–0.9)
MONOCYTES NFR BLD AUTO: 8 % (ref 5–12)
NEUTROPHILS NFR BLD AUTO: 6.21 10*3/MM3 (ref 1.7–7)
NEUTROPHILS NFR BLD AUTO: 64.5 % (ref 42.7–76)
NRBC BLD AUTO-RTO: 0 /100 WBC (ref 0–0.2)
PLATELET # BLD AUTO: 340 10*3/MM3 (ref 140–450)
PMV BLD AUTO: 10 FL (ref 6–12)
POTASSIUM SERPL-SCNC: 4.8 MMOL/L (ref 3.5–5.2)
PROT SERPL-MCNC: 7.2 G/DL (ref 6–8.5)
RBC # BLD AUTO: 3.47 10*6/MM3 (ref 3.77–5.28)
SODIUM SERPL-SCNC: 140 MMOL/L (ref 136–145)
WBC NRBC COR # BLD AUTO: 9.64 10*3/MM3 (ref 3.4–10.8)

## 2024-03-08 PROCEDURE — 85025 COMPLETE CBC W/AUTO DIFF WBC: CPT | Performed by: EMERGENCY MEDICINE

## 2024-03-08 PROCEDURE — 80053 COMPREHEN METABOLIC PANEL: CPT | Performed by: EMERGENCY MEDICINE

## 2024-03-08 PROCEDURE — 94799 UNLISTED PULMONARY SVC/PX: CPT

## 2024-03-08 PROCEDURE — 74176 CT ABD & PELVIS W/O CONTRAST: CPT

## 2024-03-08 PROCEDURE — 94761 N-INVAS EAR/PLS OXIMETRY MLT: CPT

## 2024-03-08 PROCEDURE — 99284 EMERGENCY DEPT VISIT MOD MDM: CPT

## 2024-03-08 PROCEDURE — 84703 CHORIONIC GONADOTROPIN ASSAY: CPT | Performed by: EMERGENCY MEDICINE

## 2024-03-08 PROCEDURE — 25010000002 CEFTRIAXONE PER 250 MG: Performed by: EMERGENCY MEDICINE

## 2024-03-08 PROCEDURE — 96365 THER/PROPH/DIAG IV INF INIT: CPT

## 2024-03-08 RX ADMIN — CEFTRIAXONE SODIUM 2000 MG: 2 INJECTION, POWDER, FOR SOLUTION INTRAMUSCULAR; INTRAVENOUS at 23:35

## 2024-03-09 VITALS
SYSTOLIC BLOOD PRESSURE: 107 MMHG | TEMPERATURE: 98.4 F | HEIGHT: 60 IN | DIASTOLIC BLOOD PRESSURE: 72 MMHG | OXYGEN SATURATION: 100 % | HEART RATE: 72 BPM | WEIGHT: 150 LBS | RESPIRATION RATE: 20 BRPM | BODY MASS INDEX: 29.45 KG/M2

## 2024-03-09 NOTE — DISCHARGE INSTRUCTIONS
Follow-up with [facility] tomorrow regarding urology appointment over the next 48 to 72 hours to have nephrostomy tube checked.  Continue take all medications as prescribed and return immediately to the emergency department for any worsening symptoms or for any other reasons

## 2024-03-09 NOTE — ED PROVIDER NOTES
Subjective   History of Present Illness  Patient is a 48-year-old female presents emergency department from [facility] for possible dislodgment of left nephrostomy tube which patient has had for approximately 1 year.  Nephrostomy tube was originally placed to [facility] in [place] and patient has this replaced typically every 90 days and her last replacement was actually January of this year.  Patient's  reports that they were doing routine patient care at the [facility] this evening when they attempted logroll patient and the nephrostomy tube caught on something and was pulled and they were concerned that it was dislodged at that time.  The tube did not come out completely but the  says it was dislodged partially approximately 1 to 2 cm.  Patient is on a ventilator secondary to complications related to COVID dating back to 2022.  The  states that she had a bladder rupture originally and then an outlet obstruction from her left kidney which is what led to infection of the kidney and eventual nephrostomy tube placement approximately 1 year ago.  Patient's  reports that he believes the output from the nephrostomy tube into the collection bag has decreased since possible dislodgment this evening.        Review of Systems   Unable to perform ROS: Patient nonverbal   Constitutional:  Negative for fever.   Gastrointestinal:  Negative for diarrhea and vomiting.   Skin:  Negative for color change.       Past Medical History:   Diagnosis Date    Acute kidney failure, unspecified     Angina at rest     Anoxic brain damage, not elsewhere classified     Chronic pulmonary embolism     Chronic respiratory failure, unspecified whether with hypoxia or hypercapnia     Dependence on respirator (ventilator) status     Gastrointestinal hemorrhage, unspecified     Hypercalcemia     Iron deficiency anemia     Metabolic encephalopathy     Migraine     Sees Pain Management for injections.     MVP (mitral  valve prolapse)     Other dysphagia     Other pulmonary embolism with acute cor pulmonale     Renal disorder     Ruptured bladder     Syncope     Urinary tract infection, site not specified        Allergies   Allergen Reactions    Coconut Unknown - High Severity    Levaquin [Levofloxacin] Other (See Comments)     unknown    Nuts Unknown - High Severity    Penicillins Rash     Patient's father noted patient had taken penicillin, many years ago, many times and then started developing a rash when she would take it.  She has received cefepime, ceftriaxone and cephalexin with no known adverse problems    Turkey Other (See Comments)     Causes migraines per pt reports       Past Surgical History:   Procedure Laterality Date    ABDOMINAL SURGERY      BREAST BIOPSY Right 2011    benign    GTUBE INSERTION      INSERTION HEMODIALYSIS CATHETER Left 09/16/2021    Procedure: HEMODIALYSIS CATHETER INSERTION;  Surgeon: Anders Kaur MD;  Location: [place] [address];  Service: Vascular;  Laterality: Left;    TONSILLECTOMY      TRACHEOSTOMY         Family History   Problem Relation Age of Onset    Colon cancer Maternal Aunt 52    Fibromyalgia Mother     Heart disease Mother     Hypertension Mother     Hodgkin's lymphoma Paternal Grandmother     Ovarian cancer Neg Hx     Breast cancer Neg Hx        Social History     Socioeconomic History    Marital status:    Tobacco Use    Smoking status: Never    Smokeless tobacco: Never   Vaping Use    Vaping status: Never Used   Substance and Sexual Activity    Alcohol use: No    Drug use: No           Objective   Physical Exam  Vitals and nursing note reviewed. Exam conducted with a chaperone present.   Constitutional:       General: She is not in acute distress.     Comments: Patient with contractures, bed bound, with trachea on ventilator.  Cachectic in appearance   HENT:      Head: Normocephalic and atraumatic.      Right Ear: External ear normal.      Left Ear: External  ear normal.      Nose: Nose normal. No congestion or rhinorrhea.      Mouth/Throat:      Mouth: Mucous membranes are moist.      Pharynx: Oropharynx is clear.   Eyes:      General: No scleral icterus.        Right eye: No discharge.         Left eye: No discharge.      Extraocular Movements: Extraocular movements intact.      Conjunctiva/sclera: Conjunctivae normal.      Pupils: Pupils are equal, round, and reactive to light.   Neck:      Comments: Tracheostomy tube  Cardiovascular:      Rate and Rhythm: Normal rate and regular rhythm.      Pulses: Normal pulses.      Heart sounds: Normal heart sounds. No murmur heard.     No friction rub. No gallop.   Pulmonary:      Effort: Pulmonary effort is normal.      Breath sounds: Normal breath sounds. No wheezing, rhonchi or rales.      Comments: Patient is on ventilator  Abdominal:      General: Abdomen is flat. Bowel sounds are normal. There is no distension.      Palpations: Abdomen is soft. There is no mass.      Tenderness: There is no abdominal tenderness. There is no guarding or rebound.      Comments: Left nephrostomy tube exiting from back and regional left CVA.  Appears to be dislodged approximately 1 cm without sutures to hold in place.  Dressing is applied over the nephrostomy tube.  Collection bag has approximately 5 cc of dark pus   Musculoskeletal:      Cervical back: Normal range of motion and neck supple. No rigidity.      Right lower leg: No edema.      Left lower leg: No edema.      Comments: Contractures of all 4 extremities   Skin:     General: Skin is warm and dry.      Capillary Refill: Capillary refill takes less than 2 seconds.      Coloration: Skin is pale. Skin is not jaundiced.      Findings: No bruising, erythema or rash.   Neurological:      Mental Status: She is alert. Mental status is at baseline.      Cranial Nerves: No cranial nerve deficit.      Comments: Patient is contracted in all four extremities, bedbound and on a ventilator          Procedures           ED Course                                             Medical Decision Making  Patient is 48-year-old female that he is at an LTAC facility post COVID and has a history of placement of a left nephrostomy tube secondary to outlet obstruction and possibly had this nephrostomy tube dislodged this evening during routine patient care.  Will obtain CAT scan of the abdomen pelvis to determine position of nephrostomy tube.  CT the abdomen pelvis without contrast shows the nephrostomy tube in good position in the left kidney.  Spoke with Dr. Dyer and he states that with the tube in good position on CT that the only thing that needs to be done is to tape the tube down for now and have the patient follow-up with [facility] for further care.  OpSite was placed on top of antibiotic impregnated disc at base of nephrostomy tube and patient given Rocephin 2 g IV and patient discharged to return to LTAC care.  informed of course of treatment and plan of care and agrees to f/u with [facility] for reassesment and reevaluation of nephrostomy tube and that urology was contacted and they reccomen patient ot f/u on OP basis with [facility]    Problems Addressed:  Nephrostomy tube displaced: complicated acute illness or injury    Amount and/or Complexity of Data Reviewed  Labs: ordered.  Radiology: ordered.        Final diagnoses:   None       ED Disposition  ED Disposition       None            No follow-up provider specified.       Medication List      No changes were made to your prescriptions during this visit.            Saul Wiley MD  03/09/24 9028

## 2024-03-19 ENCOUNTER — APPOINTMENT (OUTPATIENT)
Dept: GENERAL RADIOLOGY | Facility: HOSPITAL | Age: 49
End: 2024-03-19
Payer: MEDICARE

## 2024-03-19 ENCOUNTER — HOSPITAL ENCOUNTER (EMERGENCY)
Facility: HOSPITAL | Age: 49
Discharge: NURSING, REHAB OR LONG-TERM CARE | End: 2024-03-19
Attending: EMERGENCY MEDICINE | Admitting: EMERGENCY MEDICINE
Payer: MEDICARE

## 2024-03-19 VITALS
OXYGEN SATURATION: 99 % | RESPIRATION RATE: 20 BRPM | TEMPERATURE: 99.3 F | WEIGHT: 150 LBS | BODY MASS INDEX: 29.29 KG/M2 | DIASTOLIC BLOOD PRESSURE: 57 MMHG | SYSTOLIC BLOOD PRESSURE: 92 MMHG | HEART RATE: 83 BPM

## 2024-03-19 DIAGNOSIS — K94.23 MALFUNCTION OF GASTROSTOMY TUBE: ICD-10-CM

## 2024-03-19 DIAGNOSIS — T85.698A MALFUNCTION OF DEVICE, INITIAL ENCOUNTER: Primary | ICD-10-CM

## 2024-03-19 PROCEDURE — 99283 EMERGENCY DEPT VISIT LOW MDM: CPT

## 2024-03-19 PROCEDURE — 74018 RADEX ABDOMEN 1 VIEW: CPT

## 2024-03-19 NOTE — ED NOTES
Spoke with Sarah in surgery at this time and requested 18 Fr balloontipped PEG at this time, she states that she will go to ENDO and get a couple down to ED shortly

## 2024-03-20 NOTE — ED PROVIDER NOTES
Subjective   History of Present Illness  Patient is a 48-year-old female who presents to the ER from a local nursing home for feeding tube malfunction.  The port of the feeding tube broke off and the rest of the tube had remained in the patient's abdominal wall.  She presents for G-tube replacement.  Patient has significant past medical history that  states all began after she developed COVID in 2021.  She requires feeding tube for nutrition.  Past medical history significant for acute renal failure, anoxic brain damage, chronic PE, chronic respiratory failure, GI hemorrhage, hypercalcemia, iron deficiency anemia, metabolic encephalopathy, migraines, MVP, renal disorder, ruptured bladder, UTIs        Review of Systems   Unable to perform ROS: Patient nonverbal   Constitutional: Negative.  Negative for fever.   Respiratory: Negative.  Negative for cough.    Gastrointestinal:  Positive for constipation. Negative for diarrhea and vomiting.        Patient has chronic constipation   Skin: Negative.  Negative for rash.   All other systems reviewed and are negative.      Past Medical History:   Diagnosis Date    Acute kidney failure, unspecified     Angina at rest     Anoxic brain damage, not elsewhere classified     Chronic pulmonary embolism     Chronic respiratory failure, unspecified whether with hypoxia or hypercapnia     Dependence on respirator (ventilator) status     Gastrointestinal hemorrhage, unspecified     Hypercalcemia     Iron deficiency anemia     Metabolic encephalopathy     Migraine     Sees Pain Management for injections.     MVP (mitral valve prolapse)     Other dysphagia     Other pulmonary embolism with acute cor pulmonale     Renal disorder     Ruptured bladder     Syncope     Urinary tract infection, site not specified        Allergies   Allergen Reactions    Coconut Unknown - High Severity    Levaquin [Levofloxacin] Other (See Comments)     unknown    Nuts Unknown - High Severity     Penicillins Rash     Patient's father noted patient had taken penicillin, many years ago, many times and then started developing a rash when she would take it.  She has received cefepime, ceftriaxone and cephalexin with no known adverse problems    Turkey Other (See Comments)     Causes migraines per pt reports       Past Surgical History:   Procedure Laterality Date    ABDOMINAL SURGERY      BREAST BIOPSY Right 2011    benign    GTUBE INSERTION      INSERTION HEMODIALYSIS CATHETER Left 09/16/2021    Procedure: HEMODIALYSIS CATHETER INSERTION;  Surgeon: Anders Kaur MD;  Location: [place] [address];  Service: Vascular;  Laterality: Left;    TONSILLECTOMY      TRACHEOSTOMY         Family History   Problem Relation Age of Onset    Colon cancer Maternal Aunt 52    Fibromyalgia Mother     Heart disease Mother     Hypertension Mother     Hodgkin's lymphoma Paternal Grandmother     Ovarian cancer Neg Hx     Breast cancer Neg Hx        Social History     Socioeconomic History    Marital status:    Tobacco Use    Smoking status: Never    Smokeless tobacco: Never   Vaping Use    Vaping status: Never Used   Substance and Sexual Activity    Alcohol use: No    Drug use: No           Objective   Physical Exam  Vitals and nursing note reviewed.   Constitutional:       Appearance: She is well-developed.      Comments: Chronically ill-appearing   HENT:      Head: Normocephalic.      Right Ear: External ear normal.      Left Ear: External ear normal.      Nose: Nose normal.      Mouth/Throat:      Mouth: Mucous membranes are moist.   Eyes:      Extraocular Movements: Extraocular movements intact.      Conjunctiva/sclera: Conjunctivae normal.   Cardiovascular:      Rate and Rhythm: Normal rate and regular rhythm.      Heart sounds: Normal heart sounds.   Pulmonary:      Effort: Pulmonary effort is normal. No respiratory distress.      Breath sounds: Rales present. No wheezing.   Abdominal:      General: Bowel  sounds are normal.      Palpations: Abdomen is soft.      Comments: G-tube in the abdomen is broken at the port site however the remaining of the line is intact   Musculoskeletal:      Cervical back: Normal range of motion and neck supple.   Skin:     General: Skin is warm and dry.   Neurological:      Mental Status: She is alert. Mental status is at baseline.         Procedures           ED Course  ED Course as of 03/20/24 0923   Tue Mar 19, 2024   2022 Feeding tube broke the rest of it is still in her abdominal wall which was removed atraumatically by me [TS]      ED Course User Index  [TS] Lee Dexter MD                                             Medical Decision Making  Patient is a 48-year-old female who presents to the ER from a local nursing home for feeding tube malfunction.  The port of the feeding tube broke off and the rest of the tube had remained in the patient's abdominal wall.  She presents for G-tube replacement.  Patient has significant past medical history that  states all began after she developed COVID in 2021.  She requires feeding tube for nutrition.  Past medical history significant for acute renal failure, anoxic brain damage, chronic PE, chronic respiratory failure, GI hemorrhage, hypercalcemia, iron deficiency anemia, metabolic encephalopathy, migraines, MVP, renal disorder, ruptured bladder, UTIs  Differential diagnosis: Malfunction of G-tube, G-tube device broken, and other    XR Abdomen KUB   Final Result         Injected contrast opacifies the stomach, duodenum, and jejunum. No    evidence of contrast leak.              This report was signed and finalized on 3/19/2024 8:54 PM by Dr. Gomez Cárdenas MD.           Dr. Dexter replace G-tube in the emergency department.  Patient will be discharged back to the nursing home to resume her usual care in stable condition.    Problems Addressed:  Malfunction of device, initial encounter: acute illness or injury  Malfunction of  gastrostomy tube: acute illness or injury     Details: G-tube device was broken    Amount and/or Complexity of Data Reviewed  Radiology: ordered. Decision-making details documented in ED Course.        Final diagnoses:   Malfunction of device, initial encounter   Malfunction of gastrostomy tube       ED Disposition  ED Disposition       ED Disposition   Discharge    Condition   Good    Comment   --               No follow-up provider specified.       Medication List      No changes were made to your prescriptions during this visit.            Caron Amaya, APRN  03/20/24 0916

## 2024-03-20 NOTE — ED PROVIDER NOTES
Subjective   History of Present Illness    Review of Systems    Past Medical History:   Diagnosis Date    Acute kidney failure, unspecified     Angina at rest     Anoxic brain damage, not elsewhere classified     Chronic pulmonary embolism     Chronic respiratory failure, unspecified whether with hypoxia or hypercapnia     Dependence on respirator (ventilator) status     Gastrointestinal hemorrhage, unspecified     Hypercalcemia     Iron deficiency anemia     Metabolic encephalopathy     Migraine     Sees Pain Management for injections.     MVP (mitral valve prolapse)     Other dysphagia     Other pulmonary embolism with acute cor pulmonale     Renal disorder     Ruptured bladder     Syncope     Urinary tract infection, site not specified        Allergies   Allergen Reactions    Coconut Unknown - High Severity    Levaquin [Levofloxacin] Other (See Comments)     unknown    Nuts Unknown - High Severity    Penicillins Rash     Patient's father noted patient had taken penicillin, many years ago, many times and then started developing a rash when she would take it.  She has received cefepime, ceftriaxone and cephalexin with no known adverse problems    Turkey Other (See Comments)     Causes migraines per pt reports       Past Surgical History:   Procedure Laterality Date    ABDOMINAL SURGERY      BREAST BIOPSY Right 2011    benign    GTUBE INSERTION      INSERTION HEMODIALYSIS CATHETER Left 09/16/2021    Procedure: HEMODIALYSIS CATHETER INSERTION;  Surgeon: Anders Kaur MD;  Location: [place] [address];  Service: Vascular;  Laterality: Left;    TONSILLECTOMY      TRACHEOSTOMY         Family History   Problem Relation Age of Onset    Colon cancer Maternal Aunt 52    Fibromyalgia Mother     Heart disease Mother     Hypertension Mother     Hodgkin's lymphoma Paternal Grandmother     Ovarian cancer Neg Hx     Breast cancer Neg Hx        Social History     Socioeconomic History    Marital status:    Tobacco  Use    Smoking status: Never    Smokeless tobacco: Never   Vaping Use    Vaping status: Never Used   Substance and Sexual Activity    Alcohol use: No    Drug use: No           Objective   Physical Exam    Feeding Tube Replacement    Date/Time: 3/19/2024 8:22 PM    Performed by: Lee Dexter MD  Authorized by: Lee Dexter MD    Consent:     Consent obtained:  Written    Consent given by:  Spouse    Risks, benefits, and alternatives were discussed: yes      Risks discussed:  Bleeding, infection and pain    Alternatives discussed:  Delayed treatment  Universal protocol:     Immediately prior to procedure, a time out was called: yes      Patient identity confirmed:  Hospital-assigned identification number  Pre-procedure details:     Old tube type:  Gastrostomy    Old tube size:  18 Fr  Sedation:     Sedation type:  None  Anesthesia:     Anesthesia method:  Topical application    Topical anesthetic:  Lidocaine gel  Procedure details:     Patient position:  Recumbent    Procedure type:  Replacement    Tube type:  Gastrostomy    Tube size:  18 Fr    Bulb inflation volume:  8    Bulb inflation fluid:  Normal saline  Post-procedure details:     Placement/position confirmation:  Auscultation, contrast, insufflation of air and x-ray    Placement difficulty:  None    Bleeding:  None    Procedure completion:  Tolerated             ED Course  ED Course as of 03/19/24 2023   Tue Mar 19, 2024   2022 Feeding tube broke the rest of it is still in her abdominal wall which was removed atraumatically by me [TS]      ED Course User Index  [TS] Lee Dexter MD                                             Medical Decision Making  Amount and/or Complexity of Data Reviewed  Radiology: ordered.        Final diagnoses:   None       ED Disposition  ED Disposition       None            No follow-up provider specified.       Medication List      No changes were made to your prescriptions during this visit.            Lee Dexter  MD  03/19/24 2023

## 2024-03-27 ENCOUNTER — APPOINTMENT (OUTPATIENT)
Dept: GENERAL RADIOLOGY | Facility: HOSPITAL | Age: 49
End: 2024-03-27
Payer: MEDICARE

## 2024-03-27 ENCOUNTER — HOSPITAL ENCOUNTER (INPATIENT)
Facility: HOSPITAL | Age: 49
LOS: 5 days | Discharge: NURSING, REHAB OR LONG-TERM CARE | End: 2024-04-02
Attending: EMERGENCY MEDICINE | Admitting: FAMILY MEDICINE
Payer: MEDICARE

## 2024-03-27 DIAGNOSIS — T83.511A URINARY TRACT INFECTION ASSOCIATED WITH INDWELLING URETHRAL CATHETER, INITIAL ENCOUNTER: ICD-10-CM

## 2024-03-27 DIAGNOSIS — N39.0 URINARY TRACT INFECTION ASSOCIATED WITH INDWELLING URETHRAL CATHETER, INITIAL ENCOUNTER: ICD-10-CM

## 2024-03-27 DIAGNOSIS — I95.9 HYPOTENSION, UNSPECIFIED HYPOTENSION TYPE: ICD-10-CM

## 2024-03-27 DIAGNOSIS — R79.89 ELEVATED TROPONIN I LEVEL: Primary | ICD-10-CM

## 2024-03-27 DIAGNOSIS — E87.20 LACTIC ACIDOSIS: ICD-10-CM

## 2024-03-27 LAB
ALBUMIN SERPL-MCNC: 3.9 G/DL (ref 3.5–5.2)
ALBUMIN/GLOB SERPL: 1.3 G/DL
ALP SERPL-CCNC: 68 U/L (ref 39–117)
ALT SERPL W P-5'-P-CCNC: 15 U/L (ref 1–33)
ANION GAP SERPL CALCULATED.3IONS-SCNC: 12 MMOL/L (ref 5–15)
AST SERPL-CCNC: 17 U/L (ref 1–32)
BASOPHILS # BLD AUTO: 0.02 10*3/MM3 (ref 0–0.2)
BASOPHILS NFR BLD AUTO: 0.2 % (ref 0–1.5)
BILIRUB SERPL-MCNC: 0.4 MG/DL (ref 0–1.2)
BUN SERPL-MCNC: 34 MG/DL (ref 6–20)
BUN/CREAT SERPL: 73.9 (ref 7–25)
CALCIUM SPEC-SCNC: 9.6 MG/DL (ref 8.6–10.5)
CHLORIDE SERPL-SCNC: 101 MMOL/L (ref 98–107)
CO2 SERPL-SCNC: 27 MMOL/L (ref 22–29)
CREAT SERPL-MCNC: 0.46 MG/DL (ref 0.57–1)
D-LACTATE SERPL-SCNC: 2.2 MMOL/L (ref 0.5–2)
DEPRECATED RDW RBC AUTO: 44.8 FL (ref 37–54)
EGFRCR SERPLBLD CKD-EPI 2021: 119.7 ML/MIN/1.73
EOSINOPHIL # BLD AUTO: 0.38 10*3/MM3 (ref 0–0.4)
EOSINOPHIL NFR BLD AUTO: 4 % (ref 0.3–6.2)
ERYTHROCYTE [DISTWIDTH] IN BLOOD BY AUTOMATED COUNT: 13.1 % (ref 12.3–15.4)
GLOBULIN UR ELPH-MCNC: 2.9 GM/DL
GLUCOSE SERPL-MCNC: 164 MG/DL (ref 65–99)
HCT VFR BLD AUTO: 31.5 % (ref 34–46.6)
HGB BLD-MCNC: 10.2 G/DL (ref 12–15.9)
IMM GRANULOCYTES # BLD AUTO: 0.03 10*3/MM3 (ref 0–0.05)
IMM GRANULOCYTES NFR BLD AUTO: 0.3 % (ref 0–0.5)
LYMPHOCYTES # BLD AUTO: 1.51 10*3/MM3 (ref 0.7–3.1)
LYMPHOCYTES NFR BLD AUTO: 15.7 % (ref 19.6–45.3)
MAGNESIUM SERPL-MCNC: 2.2 MG/DL (ref 1.6–2.6)
MCH RBC QN AUTO: 30.8 PG (ref 26.6–33)
MCHC RBC AUTO-ENTMCNC: 32.4 G/DL (ref 31.5–35.7)
MCV RBC AUTO: 95.2 FL (ref 79–97)
MONOCYTES # BLD AUTO: 0.52 10*3/MM3 (ref 0.1–0.9)
MONOCYTES NFR BLD AUTO: 5.4 % (ref 5–12)
NEUTROPHILS NFR BLD AUTO: 7.15 10*3/MM3 (ref 1.7–7)
NEUTROPHILS NFR BLD AUTO: 74.4 % (ref 42.7–76)
NRBC BLD AUTO-RTO: 0 /100 WBC (ref 0–0.2)
PLATELET # BLD AUTO: 229 10*3/MM3 (ref 140–450)
PMV BLD AUTO: 11.3 FL (ref 6–12)
POTASSIUM SERPL-SCNC: 4 MMOL/L (ref 3.5–5.2)
PROT SERPL-MCNC: 6.8 G/DL (ref 6–8.5)
RBC # BLD AUTO: 3.31 10*6/MM3 (ref 3.77–5.28)
SODIUM SERPL-SCNC: 140 MMOL/L (ref 136–145)
TROPONIN T SERPL HS-MCNC: 86 NG/L
WBC NRBC COR # BLD AUTO: 9.61 10*3/MM3 (ref 3.4–10.8)

## 2024-03-27 PROCEDURE — 71045 X-RAY EXAM CHEST 1 VIEW: CPT

## 2024-03-27 PROCEDURE — 80053 COMPREHEN METABOLIC PANEL: CPT | Performed by: EMERGENCY MEDICINE

## 2024-03-27 PROCEDURE — 85025 COMPLETE CBC W/AUTO DIFF WBC: CPT | Performed by: EMERGENCY MEDICINE

## 2024-03-27 PROCEDURE — 36415 COLL VENOUS BLD VENIPUNCTURE: CPT

## 2024-03-27 PROCEDURE — 83605 ASSAY OF LACTIC ACID: CPT | Performed by: EMERGENCY MEDICINE

## 2024-03-27 PROCEDURE — 51702 INSERT TEMP BLADDER CATH: CPT

## 2024-03-27 PROCEDURE — 87040 BLOOD CULTURE FOR BACTERIA: CPT | Performed by: EMERGENCY MEDICINE

## 2024-03-27 PROCEDURE — 25810000003 SODIUM CHLORIDE 0.9 % SOLUTION: Performed by: EMERGENCY MEDICINE

## 2024-03-27 PROCEDURE — 84145 PROCALCITONIN (PCT): CPT | Performed by: EMERGENCY MEDICINE

## 2024-03-27 PROCEDURE — 99285 EMERGENCY DEPT VISIT HI MDM: CPT

## 2024-03-27 PROCEDURE — 84484 ASSAY OF TROPONIN QUANT: CPT | Performed by: EMERGENCY MEDICINE

## 2024-03-27 PROCEDURE — 87076 CULTURE ANAEROBE IDENT EACH: CPT | Performed by: EMERGENCY MEDICINE

## 2024-03-27 PROCEDURE — 83735 ASSAY OF MAGNESIUM: CPT | Performed by: EMERGENCY MEDICINE

## 2024-03-27 RX ORDER — SODIUM CHLORIDE 0.9 % (FLUSH) 0.9 %
10 SYRINGE (ML) INJECTION AS NEEDED
Status: DISCONTINUED | OUTPATIENT
Start: 2024-03-27 | End: 2024-04-02 | Stop reason: HOSPADM

## 2024-03-27 RX ADMIN — SODIUM CHLORIDE 1000 ML: 9 INJECTION, SOLUTION INTRAVENOUS at 23:47

## 2024-03-28 ENCOUNTER — APPOINTMENT (OUTPATIENT)
Dept: GENERAL RADIOLOGY | Facility: HOSPITAL | Age: 49
End: 2024-03-28
Payer: MEDICARE

## 2024-03-28 PROBLEM — N39.0 UTI (URINARY TRACT INFECTION): Status: ACTIVE | Noted: 2024-03-28

## 2024-03-28 LAB
ALBUMIN SERPL-MCNC: 3.2 G/DL (ref 3.5–5.2)
ALBUMIN/GLOB SERPL: 1.1 G/DL
ALP SERPL-CCNC: 62 U/L (ref 39–117)
ALT SERPL W P-5'-P-CCNC: 15 U/L (ref 1–33)
ANION GAP SERPL CALCULATED.3IONS-SCNC: 13 MMOL/L (ref 5–15)
AST SERPL-CCNC: 21 U/L (ref 1–32)
BACTERIA UR QL AUTO: ABNORMAL /HPF
BASOPHILS # BLD AUTO: 0.04 10*3/MM3 (ref 0–0.2)
BASOPHILS NFR BLD AUTO: 0.6 % (ref 0–1.5)
BILIRUB SERPL-MCNC: 0.4 MG/DL (ref 0–1.2)
BILIRUB UR QL STRIP: NEGATIVE
BUN SERPL-MCNC: 28 MG/DL (ref 6–20)
BUN/CREAT SERPL: 80 (ref 7–25)
CALCIUM SPEC-SCNC: 8.6 MG/DL (ref 8.6–10.5)
CHLORIDE SERPL-SCNC: 106 MMOL/L (ref 98–107)
CLARITY UR: ABNORMAL
CO2 SERPL-SCNC: 20 MMOL/L (ref 22–29)
COD CRY URNS QL: ABNORMAL /HPF
COLOR UR: ABNORMAL
CREAT SERPL-MCNC: 0.35 MG/DL (ref 0.57–1)
D-LACTATE SERPL-SCNC: 1 MMOL/L (ref 0.5–2)
DEPRECATED RDW RBC AUTO: 48.2 FL (ref 37–54)
EGFRCR SERPLBLD CKD-EPI 2021: 127.8 ML/MIN/1.73
EOSINOPHIL # BLD AUTO: 0.42 10*3/MM3 (ref 0–0.4)
EOSINOPHIL NFR BLD AUTO: 6.1 % (ref 0.3–6.2)
ERYTHROCYTE [DISTWIDTH] IN BLOOD BY AUTOMATED COUNT: 13 % (ref 12.3–15.4)
GEN 5 2HR TROPONIN T REFLEX: 82 NG/L
GLOBULIN UR ELPH-MCNC: 3 GM/DL
GLUCOSE BLDC GLUCOMTR-MCNC: 92 MG/DL (ref 70–130)
GLUCOSE BLDC GLUCOMTR-MCNC: 97 MG/DL (ref 70–130)
GLUCOSE SERPL-MCNC: 89 MG/DL (ref 65–99)
GLUCOSE UR STRIP-MCNC: NEGATIVE MG/DL
HCT VFR BLD AUTO: 37.4 % (ref 34–46.6)
HGB BLD-MCNC: 11.3 G/DL (ref 12–15.9)
HGB UR QL STRIP.AUTO: ABNORMAL
HOLD SPECIMEN: NORMAL
HOLD SPECIMEN: NORMAL
HYALINE CASTS UR QL AUTO: ABNORMAL /LPF
IMM GRANULOCYTES # BLD AUTO: 0.04 10*3/MM3 (ref 0–0.05)
IMM GRANULOCYTES NFR BLD AUTO: 0.6 % (ref 0–0.5)
KETONES UR QL STRIP: NEGATIVE
LEUKOCYTE ESTERASE UR QL STRIP.AUTO: ABNORMAL
LYMPHOCYTES # BLD AUTO: 1.74 10*3/MM3 (ref 0.7–3.1)
LYMPHOCYTES NFR BLD AUTO: 25.1 % (ref 19.6–45.3)
MAGNESIUM SERPL-MCNC: 2.2 MG/DL (ref 1.6–2.6)
MCH RBC QN AUTO: 30.5 PG (ref 26.6–33)
MCHC RBC AUTO-ENTMCNC: 30.2 G/DL (ref 31.5–35.7)
MCV RBC AUTO: 101.1 FL (ref 79–97)
MONOCYTES # BLD AUTO: 0.52 10*3/MM3 (ref 0.1–0.9)
MONOCYTES NFR BLD AUTO: 7.5 % (ref 5–12)
NEUTROPHILS NFR BLD AUTO: 4.16 10*3/MM3 (ref 1.7–7)
NEUTROPHILS NFR BLD AUTO: 60.1 % (ref 42.7–76)
NITRITE UR QL STRIP: POSITIVE
NRBC BLD AUTO-RTO: 0 /100 WBC (ref 0–0.2)
PH UR STRIP.AUTO: 5.5 [PH] (ref 5–8)
PHOSPHATE SERPL-MCNC: 2.5 MG/DL (ref 2.5–4.5)
PLATELET # BLD AUTO: 202 10*3/MM3 (ref 140–450)
PMV BLD AUTO: 11.1 FL (ref 6–12)
POTASSIUM SERPL-SCNC: 4.2 MMOL/L (ref 3.5–5.2)
PROCALCITONIN SERPL-MCNC: 0.85 NG/ML (ref 0–0.25)
PROT SERPL-MCNC: 6.2 G/DL (ref 6–8.5)
PROT UR QL STRIP: ABNORMAL
QT INTERVAL: 388 MS
QTC INTERVAL: 453 MS
RBC # BLD AUTO: 3.7 10*6/MM3 (ref 3.77–5.28)
RBC # UR STRIP: ABNORMAL /HPF
REF LAB TEST METHOD: ABNORMAL
SODIUM SERPL-SCNC: 139 MMOL/L (ref 136–145)
SP GR UR STRIP: 1.02 (ref 1–1.03)
SQUAMOUS #/AREA URNS HPF: ABNORMAL /HPF
TROPONIN T DELTA: -4 NG/L
UROBILINOGEN UR QL STRIP: ABNORMAL
WBC # UR STRIP: ABNORMAL /HPF
WBC NRBC COR # BLD AUTO: 6.92 10*3/MM3 (ref 3.4–10.8)
WHOLE BLOOD HOLD COAG: NORMAL
WHOLE BLOOD HOLD SPECIMEN: NORMAL
YEAST URNS QL MICRO: ABNORMAL /HPF

## 2024-03-28 PROCEDURE — 83735 ASSAY OF MAGNESIUM: CPT | Performed by: STUDENT IN AN ORGANIZED HEALTH CARE EDUCATION/TRAINING PROGRAM

## 2024-03-28 PROCEDURE — 25010000002 PIPERACILLIN SOD-TAZOBACTAM PER 1 G: Performed by: EMERGENCY MEDICINE

## 2024-03-28 PROCEDURE — 74018 RADEX ABDOMEN 1 VIEW: CPT

## 2024-03-28 PROCEDURE — 84484 ASSAY OF TROPONIN QUANT: CPT | Performed by: EMERGENCY MEDICINE

## 2024-03-28 PROCEDURE — 25010000002 ENOXAPARIN PER 10 MG: Performed by: STUDENT IN AN ORGANIZED HEALTH CARE EDUCATION/TRAINING PROGRAM

## 2024-03-28 PROCEDURE — 94799 UNLISTED PULMONARY SVC/PX: CPT

## 2024-03-28 PROCEDURE — 93005 ELECTROCARDIOGRAM TRACING: CPT | Performed by: EMERGENCY MEDICINE

## 2024-03-28 PROCEDURE — 85025 COMPLETE CBC W/AUTO DIFF WBC: CPT | Performed by: STUDENT IN AN ORGANIZED HEALTH CARE EDUCATION/TRAINING PROGRAM

## 2024-03-28 PROCEDURE — 87077 CULTURE AEROBIC IDENTIFY: CPT | Performed by: EMERGENCY MEDICINE

## 2024-03-28 PROCEDURE — 81001 URINALYSIS AUTO W/SCOPE: CPT | Performed by: EMERGENCY MEDICINE

## 2024-03-28 PROCEDURE — 82948 REAGENT STRIP/BLOOD GLUCOSE: CPT

## 2024-03-28 PROCEDURE — 25810000003 SODIUM CHLORIDE 0.9 % SOLUTION: Performed by: EMERGENCY MEDICINE

## 2024-03-28 PROCEDURE — 84100 ASSAY OF PHOSPHORUS: CPT | Performed by: STUDENT IN AN ORGANIZED HEALTH CARE EDUCATION/TRAINING PROGRAM

## 2024-03-28 PROCEDURE — 83605 ASSAY OF LACTIC ACID: CPT | Performed by: STUDENT IN AN ORGANIZED HEALTH CARE EDUCATION/TRAINING PROGRAM

## 2024-03-28 PROCEDURE — 80053 COMPREHEN METABOLIC PANEL: CPT | Performed by: STUDENT IN AN ORGANIZED HEALTH CARE EDUCATION/TRAINING PROGRAM

## 2024-03-28 PROCEDURE — 87186 SC STD MICRODIL/AGAR DIL: CPT | Performed by: EMERGENCY MEDICINE

## 2024-03-28 PROCEDURE — 25810000003 LACTATED RINGERS PER 1000 ML: Performed by: STUDENT IN AN ORGANIZED HEALTH CARE EDUCATION/TRAINING PROGRAM

## 2024-03-28 PROCEDURE — 87086 URINE CULTURE/COLONY COUNT: CPT | Performed by: EMERGENCY MEDICINE

## 2024-03-28 PROCEDURE — 25010000002 PIPERACILLIN SOD-TAZOBACTAM PER 1 G: Performed by: STUDENT IN AN ORGANIZED HEALTH CARE EDUCATION/TRAINING PROGRAM

## 2024-03-28 PROCEDURE — 93010 ELECTROCARDIOGRAM REPORT: CPT | Performed by: EMERGENCY MEDICINE

## 2024-03-28 RX ORDER — NITROGLYCERIN 0.4 MG/1
0.4 TABLET SUBLINGUAL
Status: DISCONTINUED | OUTPATIENT
Start: 2024-03-28 | End: 2024-04-02 | Stop reason: HOSPADM

## 2024-03-28 RX ORDER — ENOXAPARIN SODIUM 100 MG/ML
40 INJECTION SUBCUTANEOUS DAILY
Status: DISCONTINUED | OUTPATIENT
Start: 2024-03-28 | End: 2024-04-02 | Stop reason: HOSPADM

## 2024-03-28 RX ORDER — BACLOFEN 10 MG/1
20 TABLET ORAL EVERY 6 HOURS
Status: DISCONTINUED | OUTPATIENT
Start: 2024-03-28 | End: 2024-03-28

## 2024-03-28 RX ORDER — AMINO AC/PROTEIN HYDR/WHEY PRO 10G-100/30
45 LIQUID (ML) ORAL DAILY
Status: DISCONTINUED | OUTPATIENT
Start: 2024-03-28 | End: 2024-04-01

## 2024-03-28 RX ORDER — GUAIFENESIN 200 MG/10ML
200 LIQUID ORAL 2 TIMES DAILY
Status: DISCONTINUED | OUTPATIENT
Start: 2024-03-28 | End: 2024-03-28

## 2024-03-28 RX ORDER — ONDANSETRON 4 MG/1
4 TABLET, ORALLY DISINTEGRATING ORAL EVERY 6 HOURS PRN
Status: DISCONTINUED | OUTPATIENT
Start: 2024-03-28 | End: 2024-04-02 | Stop reason: HOSPADM

## 2024-03-28 RX ORDER — HYDROCODONE BITARTRATE AND ACETAMINOPHEN 5; 325 MG/1; MG/1
1 TABLET ORAL EVERY 6 HOURS PRN
Status: DISCONTINUED | OUTPATIENT
Start: 2024-03-28 | End: 2024-03-28

## 2024-03-28 RX ORDER — CETIRIZINE HYDROCHLORIDE 10 MG/1
10 TABLET ORAL DAILY
COMMUNITY

## 2024-03-28 RX ORDER — PANTOPRAZOLE SODIUM 40 MG/1
40 TABLET, DELAYED RELEASE ORAL 2 TIMES DAILY
Status: DISCONTINUED | OUTPATIENT
Start: 2024-03-28 | End: 2024-03-28

## 2024-03-28 RX ORDER — AMOXICILLIN 250 MG
2 CAPSULE ORAL 2 TIMES DAILY
Status: DISCONTINUED | OUTPATIENT
Start: 2024-03-28 | End: 2024-03-28

## 2024-03-28 RX ORDER — SODIUM CHLORIDE 0.9 % (FLUSH) 0.9 %
10 SYRINGE (ML) INJECTION EVERY 12 HOURS SCHEDULED
Status: DISCONTINUED | OUTPATIENT
Start: 2024-03-28 | End: 2024-04-02 | Stop reason: HOSPADM

## 2024-03-28 RX ORDER — HYDROXYZINE HYDROCHLORIDE 25 MG/1
25 TABLET, FILM COATED ORAL EVERY 6 HOURS PRN
Status: DISCONTINUED | OUTPATIENT
Start: 2024-03-28 | End: 2024-04-02 | Stop reason: HOSPADM

## 2024-03-28 RX ORDER — BISACODYL 5 MG/1
5 TABLET, DELAYED RELEASE ORAL DAILY PRN
Status: DISCONTINUED | OUTPATIENT
Start: 2024-03-28 | End: 2024-03-28

## 2024-03-28 RX ORDER — ENEMA 19; 7 G/133ML; G/133ML
1 ENEMA RECTAL
Status: DISCONTINUED | OUTPATIENT
Start: 2024-03-28 | End: 2024-04-02 | Stop reason: HOSPADM

## 2024-03-28 RX ORDER — SCOLOPAMINE TRANSDERMAL SYSTEM 1 MG/1
1 PATCH, EXTENDED RELEASE TRANSDERMAL
Status: DISCONTINUED | OUTPATIENT
Start: 2024-03-28 | End: 2024-04-02 | Stop reason: HOSPADM

## 2024-03-28 RX ORDER — ONDANSETRON 4 MG/1
4 TABLET, ORALLY DISINTEGRATING ORAL EVERY 6 HOURS PRN
Status: DISCONTINUED | OUTPATIENT
Start: 2024-03-28 | End: 2024-03-28

## 2024-03-28 RX ORDER — HYDROCODONE BITARTRATE AND ACETAMINOPHEN 5; 325 MG/1; MG/1
1 TABLET ORAL EVERY 6 HOURS PRN
Status: DISCONTINUED | OUTPATIENT
Start: 2024-03-28 | End: 2024-03-29

## 2024-03-28 RX ORDER — POLYETHYLENE GLYCOL 3350 17 G/17G
17 POWDER, FOR SOLUTION ORAL DAILY PRN
Status: DISCONTINUED | OUTPATIENT
Start: 2024-03-28 | End: 2024-03-31

## 2024-03-28 RX ORDER — SODIUM CHLORIDE 0.9 % (FLUSH) 0.9 %
10 SYRINGE (ML) INJECTION AS NEEDED
Status: DISCONTINUED | OUTPATIENT
Start: 2024-03-28 | End: 2024-04-02 | Stop reason: HOSPADM

## 2024-03-28 RX ORDER — ROSUVASTATIN CALCIUM 5 MG/1
5 TABLET, COATED ORAL DAILY
Status: DISCONTINUED | OUTPATIENT
Start: 2024-03-28 | End: 2024-04-02 | Stop reason: HOSPADM

## 2024-03-28 RX ORDER — POLYETHYLENE GLYCOL 3350 17 G/17G
17 POWDER, FOR SOLUTION ORAL DAILY PRN
Status: DISCONTINUED | OUTPATIENT
Start: 2024-03-28 | End: 2024-03-28

## 2024-03-28 RX ORDER — CHLORHEXIDINE GLUCONATE 500 MG/1
1 CLOTH TOPICAL EVERY 24 HOURS
Status: DISCONTINUED | OUTPATIENT
Start: 2024-03-29 | End: 2024-03-29 | Stop reason: HOSPADM

## 2024-03-28 RX ORDER — SODIUM CHLORIDE, SODIUM LACTATE, POTASSIUM CHLORIDE, CALCIUM CHLORIDE 600; 310; 30; 20 MG/100ML; MG/100ML; MG/100ML; MG/100ML
75 INJECTION, SOLUTION INTRAVENOUS CONTINUOUS
Status: DISCONTINUED | OUTPATIENT
Start: 2024-03-28 | End: 2024-04-02

## 2024-03-28 RX ORDER — BISACODYL 10 MG
10 SUPPOSITORY, RECTAL RECTAL
Status: DISCONTINUED | OUTPATIENT
Start: 2024-03-28 | End: 2024-03-28 | Stop reason: SDUPTHER

## 2024-03-28 RX ORDER — ONDANSETRON 2 MG/ML
4 INJECTION INTRAMUSCULAR; INTRAVENOUS EVERY 6 HOURS PRN
Status: DISCONTINUED | OUTPATIENT
Start: 2024-03-28 | End: 2024-03-28

## 2024-03-28 RX ORDER — TIZANIDINE 4 MG/1
8 TABLET ORAL EVERY 8 HOURS PRN
Status: DISCONTINUED | OUTPATIENT
Start: 2024-03-28 | End: 2024-03-28

## 2024-03-28 RX ORDER — AMOXICILLIN 250 MG
2 CAPSULE ORAL 2 TIMES DAILY
Status: DISCONTINUED | OUTPATIENT
Start: 2024-03-28 | End: 2024-03-31

## 2024-03-28 RX ORDER — ONDANSETRON 2 MG/ML
4 INJECTION INTRAMUSCULAR; INTRAVENOUS EVERY 6 HOURS PRN
Status: DISCONTINUED | OUTPATIENT
Start: 2024-03-28 | End: 2024-04-02 | Stop reason: HOSPADM

## 2024-03-28 RX ORDER — SODIUM CHLORIDE 9 MG/ML
40 INJECTION, SOLUTION INTRAVENOUS AS NEEDED
Status: DISCONTINUED | OUTPATIENT
Start: 2024-03-28 | End: 2024-04-02 | Stop reason: HOSPADM

## 2024-03-28 RX ORDER — GUAIFENESIN 200 MG/10ML
200 LIQUID ORAL 2 TIMES DAILY
Status: DISCONTINUED | OUTPATIENT
Start: 2024-03-28 | End: 2024-04-02 | Stop reason: HOSPADM

## 2024-03-28 RX ORDER — FLUTICASONE PROPIONATE 50 MCG
2 SPRAY, SUSPENSION (ML) NASAL EVERY MORNING
COMMUNITY

## 2024-03-28 RX ORDER — TIZANIDINE 4 MG/1
8 TABLET ORAL EVERY 8 HOURS PRN
Status: DISCONTINUED | OUTPATIENT
Start: 2024-03-28 | End: 2024-03-29

## 2024-03-28 RX ORDER — BISACODYL 10 MG
10 SUPPOSITORY, RECTAL RECTAL DAILY PRN
Status: DISCONTINUED | OUTPATIENT
Start: 2024-03-28 | End: 2024-03-28

## 2024-03-28 RX ORDER — CHLORHEXIDINE GLUCONATE 500 MG/1
1 CLOTH TOPICAL ONCE
Status: COMPLETED | OUTPATIENT
Start: 2024-03-28 | End: 2024-03-28

## 2024-03-28 RX ORDER — BISACODYL 10 MG
10 SUPPOSITORY, RECTAL RECTAL DAILY PRN
Status: DISCONTINUED | OUTPATIENT
Start: 2024-03-28 | End: 2024-03-31

## 2024-03-28 RX ORDER — SIMETHICONE 40MG/0.6ML
20 SUSPENSION, DROPS(FINAL DOSAGE FORM)(ML) ORAL EVERY 6 HOURS
Status: DISCONTINUED | OUTPATIENT
Start: 2024-03-28 | End: 2024-04-02 | Stop reason: HOSPADM

## 2024-03-28 RX ORDER — AMOXICILLIN 250 MG
1 CAPSULE ORAL 2 TIMES DAILY
Status: DISCONTINUED | OUTPATIENT
Start: 2024-03-28 | End: 2024-03-28 | Stop reason: SDUPTHER

## 2024-03-28 RX ADMIN — ENOXAPARIN SODIUM 40 MG: 100 INJECTION SUBCUTANEOUS at 03:15

## 2024-03-28 RX ADMIN — SODIUM CHLORIDE, POTASSIUM CHLORIDE, SODIUM LACTATE AND CALCIUM CHLORIDE 75 ML/HR: 600; 310; 30; 20 INJECTION, SOLUTION INTRAVENOUS at 03:35

## 2024-03-28 RX ADMIN — CHLORHEXIDINE GLUCONATE 1 APPLICATION: 500 CLOTH TOPICAL at 03:35

## 2024-03-28 RX ADMIN — GUAIFENESIN 200 MG: 200 SOLUTION ORAL at 20:52

## 2024-03-28 RX ADMIN — LANSOPRAZOLE 15 MG: 15 TABLET, ORALLY DISINTEGRATING ORAL at 10:27

## 2024-03-28 RX ADMIN — Medication 1 APPLICATION: at 03:15

## 2024-03-28 RX ADMIN — Medication 20 MG: at 09:51

## 2024-03-28 RX ADMIN — SODIUM CHLORIDE 1000 ML: 9 INJECTION, SOLUTION INTRAVENOUS at 01:18

## 2024-03-28 RX ADMIN — Medication 10 ML: at 09:50

## 2024-03-28 RX ADMIN — DOCUSATE SODIUM AND SENNOSIDES 2 TABLET: 8.6; 5 TABLET, FILM COATED ORAL at 20:52

## 2024-03-28 RX ADMIN — ROSUVASTATIN CALCIUM 5 MG: 5 TABLET, FILM COATED ORAL at 09:50

## 2024-03-28 RX ADMIN — Medication 1 APPLICATION: at 20:57

## 2024-03-28 RX ADMIN — Medication 20 MG: at 15:02

## 2024-03-28 RX ADMIN — Medication 45 ML: at 13:06

## 2024-03-28 RX ADMIN — PIPERACILLIN SODIUM AND TAZOBACTAM SODIUM 3.38 G: 3; .375 INJECTION, POWDER, LYOPHILIZED, FOR SOLUTION INTRAVENOUS at 01:18

## 2024-03-28 RX ADMIN — DOCUSATE SODIUM AND SENNOSIDES 2 TABLET: 8.6; 5 TABLET, FILM COATED ORAL at 09:50

## 2024-03-28 RX ADMIN — Medication 1 APPLICATION: at 09:50

## 2024-03-28 RX ADMIN — HYDROCODONE BITARTRATE AND ACETAMINOPHEN 1 TABLET: 5; 325 TABLET ORAL at 17:23

## 2024-03-28 RX ADMIN — SCOPALAMINE 1 PATCH: 1 PATCH, EXTENDED RELEASE TRANSDERMAL at 03:49

## 2024-03-28 RX ADMIN — TIZANIDINE 8 MG: 4 TABLET ORAL at 20:52

## 2024-03-28 RX ADMIN — PIPERACILLIN SODIUM AND TAZOBACTAM SODIUM 3.38 G: 3; .375 INJECTION, POWDER, LYOPHILIZED, FOR SOLUTION INTRAVENOUS at 09:01

## 2024-03-28 RX ADMIN — PIPERACILLIN SODIUM AND TAZOBACTAM SODIUM 3.38 G: 3; .375 INJECTION, POWDER, LYOPHILIZED, FOR SOLUTION INTRAVENOUS at 17:23

## 2024-03-28 RX ADMIN — Medication 20 MG: at 20:58

## 2024-03-28 RX ADMIN — GUAIFENESIN 200 MG: 200 SOLUTION ORAL at 09:51

## 2024-03-28 NOTE — PLAN OF CARE
Goal Outcome Evaluation: Patient received from units, non verbal, contracted,PEG with TF, stratton cath, IID, IV AB going in, Trach with collar, family request SFP and was given through PEG. TQ2.

## 2024-03-28 NOTE — PROGRESS NOTES
[facility] Medicine Services  INPATIENT PROGRESS NOTE    Patient Name: Namita Zabala  Date of Admission: 3/27/2024  Today's Date: 03/28/24  Length of Stay: 0  Primary Care Physician: David Lara MD    Subjective   Chief Complaint: UTI/sepsis/hypertension/brachycardia.  HPI   Hypotension resolved.  Bradycardia resolved.  Afebrile.  Mentally patient is back to baseline.  Paraplegic/contracted.  Patient does not talk at baseline.  Trach in place.  Feeding tube in place.  Mojica cath in place.    Review of Systems   Unable to obtain, patient does not talk.  All pertinent negatives and positives are as above. All other systems have been reviewed and are negative unless otherwise stated.     Objective    Temp:  [98.3 °F (36.8 °C)-98.8 °F (37.1 °C)] 98.6 °F (37 °C)  Heart Rate:  [] 102  Resp:  [16-18] 18  BP: ()/(49-79) 110/75  Physical Exam  Vitals and nursing note reviewed.   Constitutional:       Comments: Contracted . chronically ill.     HENT:      Head: Normocephalic.      Comments: Neck leading to the right side, chronic condition.  Eyes:      Conjunctiva/sclera: Conjunctivae normal.      Pupils: Pupils are equal, round, and reactive to light.   Cardiovascular:      Rate and Rhythm: Regular rhythm. Tachycardia present.      Heart sounds: Normal heart sounds.   Pulmonary:      Effort: No respiratory distress.      Comments: Trach in place.  Diminished breath sound bilateral, clear .  Abdominal:      General: Bowel sounds are normal. There is no distension.      Palpations: Abdomen is soft.      Tenderness: There is no abdominal tenderness.   Musculoskeletal:         General: No swelling.      Cervical back: Neck supple.      Comments: Contracted all extremities.  . Paraplegic.  Atrophy of the muscle.   Skin:     General: Skin is warm and dry.      Capillary Refill: Capillary refill takes 2 to 3 seconds.      Findings: No rash.   Neurological:       "Mental Status: She is alert.             Results Review:  I have reviewed the labs, radiology results, and diagnostic studies.    Laboratory Data:   Results from last 7 days   Lab Units 03/28/24  0431 03/27/24  2327   WBC 10*3/mm3 6.92 9.61   HEMOGLOBIN g/dL 11.3* 10.2*   HEMATOCRIT % 37.4 31.5*   PLATELETS 10*3/mm3 202 229        Results from last 7 days   Lab Units 03/28/24  0431 03/27/24  2327   SODIUM mmol/L 139 140   POTASSIUM mmol/L 4.2 4.0   CHLORIDE mmol/L 106 101   CO2 mmol/L 20.0* 27.0   BUN mg/dL 28* 34*   CREATININE mg/dL 0.35* 0.46*   CALCIUM mg/dL 8.6 9.6   BILIRUBIN mg/dL 0.4 0.4   ALK PHOS U/L 62 68   ALT (SGPT) U/L 15 15   AST (SGOT) U/L 21 17   GLUCOSE mg/dL 89 164*       Culture Data:   No results found for: \"BLOODCX\", \"URINECX\", \"WOUNDCX\", \"MRSACX\", \"RESPCX\", \"STOOLCX\"    Radiology Data:   Imaging Results (Last 24 Hours)       Procedure Component Value Units Date/Time    XR Abdomen KUB [[id number]] Collected: 03/28/24 0845     Updated: 03/28/24 0850    Narrative:      XR ABDOMEN KUB- 3/28/2024 6:38 AM     HISTORY: G tube placement verification; R79.89-Other specified abnormal  findings of blood chemistry; T83.511A-Infection and inflammatory  reaction due to indwelling urethral catheter, initial encounter;  N39.0-Urinary tract infection, site not specified; E87.20-Acidosis,  unspecified; I95.9-Hypotension, unspecified     COMPARISON: Abdominal radiograph dated 3/19/2024     FINDINGS:     G-tube is present. Contrast was injected through the G-tube opacifying  the stomach and duodenum, contrast extending just beyond the ligament of  Treitz. There is a mild gaseous distention of the transverse colon.  Prominent stool in the sigmoid colon. No intraperitoneal free air.  Incidentally noted percutaneous internal/external drain.       Impression:         1.  G-tube is in good position.           This report was signed and finalized on 3/28/2024 8:47 AM by Dr Rob Ribeiro.       XR Chest 1 View " [[id number]] Collected: 03/28/24 0632     Updated: 03/28/24 0638    Narrative:      EXAMINATION: XR CHEST 1 VW-     3/27/2024 10:21 PM     HISTORY: fever     A frontal projection of the chest is compared with the previous study  dated 2/16/2024.     The lungs are poorly expanded, worse than the previous study.     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     Linear opacity in the right lower lung persist and probably represent  discoid atelectasis as suggested previously.     There is no pulmonary congestion or pneumothorax.     The heart size is not optimally evaluated due to the AP projection.     A tracheostomy tube is in place.     No acute bony abnormality.       Impression:      1. Left lower lobar consolidation which may represent an acute  inflammatory/infectious process.  2. Atelectatic changes in the right lower lung persist.        This report was signed and finalized on 3/28/2024 6:34 AM by Dr. Lui Bush MD.               I have reviewed the patient's current medications.     Assessment/Plan   Assessment  Active Hospital Problems    Diagnosis     **UTI (urinary tract infection)     History of anoxic brain injury     Functional quadriplegia     Severe malnutrition     Acute respiratory failure with hypoxia     Sepsis secondary to UTI        Treatment Plan  UTI/sepsis.  Status post 2 L bolus in ER.  Zosyn antibiotics.  Lactated ringer.    Hypotension/brachycardia/hyperlipidemia.    Blood pressure in ER was 76/52.  Tachycardia resolved.  Hypotension resolved. Crestor.    Altered mental status.  Back to baseline.    Chronic respiratory failure.  Tracheotomy in place.  Robaxin.  Scopolamine patch.  Chest x-ray-Left lower lobar consolidation which may represent an acute inflammatory/infectious process, Atelectatic changes in the right lower lung persist.  Currently on 6 L / 28%.    Urinary retention.  Mojica  cath in place.    Contracted/deconditioning.  Baclofen .    Reflux . Protonix.    Lovenox prophylaxis.    Nutrition.  G-tube in place.  Nutrition consult for tube feeding.  KUB-G-tube is in good position.     Nursing home patient .    Urine culture pending.  Blood cultures pending.    Medical Decision Making  Number and Complexity of problems: UTI/sepsis/hypotension/brachycardia/altered mental status/chronic respiratory failure/chronic Mojica, PEG tube.  Paraplegic.  Differential Diagnosis: None    Conditions and Status        Condition is unchanged.     [facility] Data  External documents reviewed: Previous note .  Cardiac tracing (EKG, telemetry) interpretation: Normal sinus rhythm.  Radiology interpretation: X-ray/KUB  Labs reviewed: Laboratory  Any tests that were considered but not ordered: Laboratory in AM     Decision rules/scores evaluated (example [id number], Wells, etc): None     Discussed with: Nursing     Care Planning  Shared decision making: Nursing  Code status and discussions: DNR.    Disposition  Social Determinants of Health that impact treatment or disposition: From nursing home.  2 to 4 days.    Electronically signed by Amado Villarreal MD, 03/28/24, 10:18 CDT.

## 2024-03-28 NOTE — NURSING NOTE
[facility]  INPATIENT WOUND & OSTOMY CARE    Today's Date: 03/28/24    Patient Name: Namita Zabala  MRN: [id number]  CSN: [id number]  PCP: David Lara MD  Attending Provider: Amado Villarreal MD  Length of Stay: 0    Rounded on patient due to low Pritesh score and patient being admitted to ICU.      Full skin assessment performed with patients nurse Virginia. All skin intact. Patient is high risk for skin breakdown due to immobility. I placed an order for a dolphin bed.     Silicone barrier cream applied to bilateral heels and sacral spine for protection. I added wedges to room and utilized them for a turn with Virginia's help.      Patient has a stratton with a leg strap. I removed leg strap and applied stratton securement device.      Pressure ulcer prevention measures ordered per protocol.      Please contact if any issues or concerns arise.     This document has been electronically signed by Josselin Villela RN on 3/28/2024 09:04 CDT

## 2024-03-28 NOTE — H&P
[facility] Medicine Services  HISTORY AND PHYSICAL    Date of Admission: 3/27/2024  Primary Care Physician: David Lara MD    Subjective   Primary Historian: Chart review and     Chief Complaint: Possible aspiration    History of Present Illness  Patient brought to ED from nursing home as  was called and told that the patient had a low pulse and low blood pressure and her head was tilted abnormally.  Initial presentation in the emergency department showed hypotension with blood pressure at 76/52.  Patient is chronically ill and not very responsive at baseline.  There is a indwelling Mojica catheter in place.  This was changed out and a UA showed significant UTI.  Patient received 2 L IV fluids and IV antibiotics and we were asked to admit for further care.  Blood pressure did improve significantly with IV fluids and the patient is back to her baseline.  Will admit to ICU        Review of Systems   Could not be obtained    Past Medical History:   Past Medical History:   Diagnosis Date    Acute kidney failure, unspecified     Angina at rest     Anoxic brain damage, not elsewhere classified     Chronic pulmonary embolism     Chronic respiratory failure, unspecified whether with hypoxia or hypercapnia     Dependence on respirator (ventilator) status     Gastrointestinal hemorrhage, unspecified     Hypercalcemia     Iron deficiency anemia     Metabolic encephalopathy     Migraine     Sees Pain Management for injections.     MVP (mitral valve prolapse)     Other dysphagia     Other pulmonary embolism with acute cor pulmonale     Renal disorder     Ruptured bladder     Syncope     Urinary tract infection, site not specified      Past Surgical History:  Past Surgical History:   Procedure Laterality Date    ABDOMINAL SURGERY      BREAST BIOPSY Right 2011    benign    GTUBE INSERTION      INSERTION HEMODIALYSIS CATHETER Left 09/16/2021    Procedure: HEMODIALYSIS  CATHETER INSERTION;  Surgeon: Anders Kaur MD;  Location: [place] [address];  Service: Vascular;  Laterality: Left;    TONSILLECTOMY      TRACHEOSTOMY       Social History:  reports that she has never smoked. She has never used smokeless tobacco. She reports that she does not drink alcohol and does not use drugs.    Family History: family history includes Colon cancer (age of onset: 52) in her maternal aunt; Fibromyalgia in her mother; Heart disease in her mother; Hodgkin's lymphoma in her paternal grandmother; Hypertension in her mother.       Allergies:  Allergies   Allergen Reactions    Coconut Unknown - High Severity    Levaquin [Levofloxacin] Other (See Comments)     unknown    Nuts Unknown - High Severity    Penicillins Rash     Patient's father noted patient had taken penicillin, many years ago, many times and then started developing a rash when she would take it.  She has received cefepime, ceftriaxone and cephalexin with no known adverse problems    Turkey Other (See Comments)     Causes migraines per pt reports       Medications:  Prior to Admission medications    Medication Sig Start Date End Date Taking? Authorizing Provider   acetaminophen (TYLENOL) 500 MG tablet Administer 1 tablet per G tube Every 4 (Four) Hours As Needed for Fever. Not to exceed 3000mg from all sources daily    Odalys Mullen MD   baclofen (LIORESAL) 20 MG tablet Administer 1 tablet per G tube Every 6 (Six) Hours.    Odalys Mullen MD   bisacodyl (DULCOLAX) 10 MG suppository Insert 1 suppository into the rectum Every Other Day As Needed for Constipation.    Odalys Mullen MD   Carboxymethylcellul-Glycerin (Refresh Optive) 0.5-0.9 % solution Apply 1 drop to eye(s) as directed by provider 2 (Two) Times a Day. Bilateral eyes    Odalys Mullen MD   chlorhexidine (PERIDEX) 0.12 % solution Apply 15 mL to the mouth or throat 2 (Two) Times a Day. 15 ml using oral swab twice daily for oral care     Odalys Mullen MD   Citric Mq-Hvchywxrzyz-Wg Carb (Renacidin) solution Irrigate with 60 mL to the affected area as directed by provider 2 (Two) Times a Day.    Odalys Mullen MD   guaifenesin (ROBITUSSIN) 100 MG/5ML liquid Take 10 mL by mouth 2 (Two) Times a Day.    Odalys Mullen MD   HYDROcodone-acetaminophen (NORCO) 5-325 MG per tablet Take 1 tablet by mouth Every 6 (Six) Hours As Needed for Moderate Pain. 12/4/23   Amado Villarreal MD   hydrOXYzine (ATARAX) 25 MG tablet Administer 1 tablet per G tube Every 6 (Six) Hours As Needed for Itching. 12/4/23   Amado Villarreal MD   liver oil-zinc oxide (DESITIN) 40 % ointment Apply 1 application  topically to the appropriate area as directed Every 4 (Four) Hours As Needed for Irritation. 12/4/23   Amado Villarreal MD   miconazole (MICOTIN) 2 % powder Apply 1 Application topically to the appropriate area as directed As Needed for Itching.    Odalys Mullen MD   multivitamin with minerals tablet tablet Take 1 tablet by mouth Daily. 12/4/23   Amado Villarreal MD   nystatin (MYCOSTATIN) 213393 UNIT/GM powder Apply 1 Application topically to the appropriate area as directed 2 (Two) Times a Day.    Odalys Mullen MD   ondansetron (ZOFRAN) 4 MG/5ML solution Take 5 mL by mouth Every 6 (Six) Hours As Needed for Nausea or Vomiting.    Odalys Mullen MD   pantoprazole (PROTONIX) 40 MG EC tablet Take 1 tablet by mouth 2 (Two) Times a Day.    Odalys Mullen MD   polyethylene glycol (MIRALAX) 17 GM/SCOOP powder Administer 17 g per G tube Every Morning.    Odalys Mullen MD   rosuvastatin (CRESTOR) 5 MG tablet Administer 1 tablet per G tube Daily.    Odalys Mullen MD   Scopolamine 1 MG/3DAYS patch Place 1 patch on the skin as directed by provider Every 72 (Seventy-Two) Hours.    Odalys Mullen MD   sennosides-docusate (senna-docusate sodium) 8.6-50 MG per tablet Take 1 tablet by mouth 2 (Two) Times a Day.    Paz  "MD Odalys   simethicone (MYLICON) 40 MG/0.6ML drops Administer 0.3 mL per G tube Every 6 (Six) Hours.    ProviderOdalys MD   sodium chloride (NS) 0.9 % irrigation Irrigate with 10 mL to the affected area as directed by provider Every 8 (Eight) Hours. Irrigation for secretions: Hydration    Odalys Mullen MD   Sodium Phosphates (fleet enema) 7-19 GM/118ML enema Insert 1 enema into the rectum Every Other Day As Needed (bowel management).    Odalys Mullen MD   tiZANidine (ZANAFLEX) 4 MG tablet Take 2 tablets by mouth Every 8 (Eight) Hours As Needed for Muscle Spasms.    ProviderOdalys MD     I have utilized all available immediate resources to obtain, update, or review the patient's current medications (including all prescriptions, over-the-counter products, herbals, cannabis/cannabidiol products, and vitamin/mineral/dietary (nutritional) supplements).    Objective     Vital Signs: BP 96/69   Pulse 81   Temp 98.3 °F (36.8 °C) (Oral)   Resp 16   Ht 152.4 cm (60\")   Wt 67.1 kg (148 lb)   LMP  (LMP Unknown)   SpO2 99%   BMI 28.90 kg/m²   Physical Exam   Constitutional:       Appearance: Chronically ill-appearing.  Multiple contractures.  HENT:      Head: Normocephalic and atraumatic.      Nose: Nose normal.      Mouth/Throat:      Mouth: Mucous membranes are dry.     Pharynx: unable to evaluate  Eyes:      Extraocular Movements: Extraocular movements preserved     Conjunctiva/sclera: Conjunctivae normal.      Pupils: Pupils are equal, round, and reactive to light.   Cardiovascular:      Rate and Rhythm: normal rate and regular rhythm.   Pulmonary:      Effort: No tachypnea.     Breath sounds: Reduced breath sounds on the right due to positioning.  Abdominal:      General: Abdominal scar extensive, with no open wounds there is a percutaneous gastrostomy tube in place.  Abdomen is nondistended.  Bowel sounds are diminished.     Palpations: Abdomen is soft.      Tenderness: There is " no guarding or rebound.   Musculoskeletal:         General: Multiple upper and lower extremity flexion contractures; decreased range of motion.    Extremities: Multiple contractures as per musculoskeletal exam; no lower extremity edema.  Skin:     Capillary Refill: Capillary refill takes less than 2 seconds.      Coloration: Skin is not jaundiced.      Findings: No rash.   Neurological:      General: Spastic quadriplegia.  I am not able to assess patient's orientation or memory.  Speech:  with aphasia.   Psychiatric evaluation not able to be performed.      Results Reviewed:  Lab Results (last 24 hours)       Procedure Component Value Units Date/Time    High Sensitivity Troponin T 2Hr [[id number]]  (Abnormal) Collected: 03/28/24 0128    Specimen: Blood from Arm, Right Updated: 03/28/24 0218     HS Troponin T 82 ng/L      Troponin T Delta -4 ng/L     Narrative:      High Sensitive Troponin T Reference Range:  <14.0 ng/L- Negative Female for AMI  <22.0 ng/L- Negative Male for AMI  >=14 - Abnormal Female indicating possible myocardial injury.  >=22 - Abnormal Male indicating possible myocardial injury.   Clinicians would have to utilize clinical acumen, EKG, Troponin, and serial changes to determine if it is an Acute Myocardial Infarction or myocardial injury due to an underlying chronic condition.         [place] Draw [[id number]] Collected: 03/27/24 2327    Specimen: Blood Updated: 03/28/24 0047    Narrative:      The following orders were created for panel order [place] Draw.  Procedure                               Abnormality         Status                     ---------                               -----------         ------                     Green Top (Gel)[[id number]]                                  Final result               Lavender Top[[id number]]                                     Final result               Red Top[[id number]]                                          Final result               Light Blue  Top[[id number]]                                   Final result                 Please view results for these tests on the individual orders.    Light Blue Top [[id number]] Collected: 03/27/24 2336    Specimen: Blood Updated: 03/28/24 0047     Extra Tube Hold for add-ons.     Comment: Auto resulted       Green Top (Gel) [[id number]] Collected: 03/27/24 2327    Specimen: Blood Updated: 03/28/24 0031     Extra Tube Hold for add-ons.     Comment: Auto resulted.       Lavender Top [[id number]] Collected: 03/27/24 2327    Specimen: Blood Updated: 03/28/24 0031     Extra Tube hold for add-on     Comment: Auto resulted       Red Top [[id number]] Collected: 03/27/24 2327    Specimen: Blood Updated: 03/28/24 0031     Extra Tube Hold for add-ons.     Comment: Auto resulted.       Urinalysis With Culture If Indicated - Indwelling Urethral Catheter [[id number]]  (Abnormal) Collected: 03/28/24 0006    Specimen: Urine from Indwelling Urethral Catheter Updated: 03/28/24 0028     Color, UA Dark Yellow     Appearance, UA Turbid     pH, UA 5.5     Specific Gravity, UA 1.025     Glucose, UA Negative     Ketones, UA Negative     Bilirubin, UA Negative     Blood, UA Large (3+)     Protein,  mg/dL (2+)     Leuk Esterase, UA Large (3+)     Nitrite, UA Positive     Urobilinogen, UA 1.0 E.U./dL    Narrative:      In absence of clinical symptoms, the presence of pyuria, bacteria, and/or nitrites on the urinalysis result does not correlate with infection.    Urinalysis, Microscopic Only - Indwelling Urethral Catheter [[id number]]  (Abnormal) Collected: 03/28/24 0006    Specimen: Urine from Indwelling Urethral Catheter Updated: 03/28/24 0028     RBC, UA 3-5 /HPF      WBC, UA Too Numerous to Count /HPF      Bacteria, UA 3+ /HPF      Squamous Epithelial Cells, UA 3-6 /HPF      Yeast, UA Small/1+ Budding Yeast /HPF      Hyaline Casts, UA None Seen /LPF      Calcium Oxalate Crystals, UA Small/1+ /HPF      Methodology Manual Light Microscopy    Urine  "Culture - Urine, Indwelling Urethral Catheter [[id number]] Collected: 03/28/24 0006    Specimen: Urine from Indwelling Urethral Catheter Updated: 03/28/24 0028    Procalcitonin [[id number]]  (Abnormal) Collected: 03/27/24 2327    Specimen: Blood Updated: 03/28/24 0000     Procalcitonin 0.85 ng/mL     Narrative:      As a Marker for Sepsis (Non-Neonates):    1. <0.5 ng/mL represents a low risk of severe sepsis and/or septic shock.  2. >2 ng/mL represents a high risk of severe sepsis and/or septic shock.    As a Marker for Lower Respiratory Tract Infections that require antibiotic therapy:    PCT on Admission    Antibiotic Therapy       6-12 Hrs later    >0.5                Strongly Recommended  >0.25 - <0.5        Recommended   0.1 - 0.25          Discouraged              Remeasure/reassess PCT  <0.1                Strongly Discouraged     Remeasure/reassess PCT    As 28 day mortality risk marker: \"Change in Procalcitonin Result\" (>80% or <=80%) if Day 0 (or Day 1) and Day 4 values are available. Refer to http://www.[employer]-pct-calculator.com    Change in PCT <=80%  A decrease of PCT levels below or equal to 80% defines a positive change in PCT test result representing a higher risk for 28-day all-cause mortality of patients diagnosed with severe sepsis for septic shock.    Change in PCT >80%  A decrease of PCT levels of more than 80% defines a negative change in PCT result representing a lower risk for 28-day all-cause mortality of patients diagnosed with severe sepsis or septic shock.       Lactic Acid, Plasma [[id number]]  (Abnormal) Collected: 03/27/24 2327    Specimen: Blood Updated: 03/27/24 2358     Lactate 2.2 mmol/L     Single High Sensitivity Troponin T [[id number]]  (Abnormal) Collected: 03/27/24 2327    Specimen: Blood Updated: 03/27/24 2358     HS Troponin T 86 ng/L     Narrative:      High Sensitive Troponin T Reference Range:  <14.0 ng/L- Negative Female for AMI  <22.0 ng/L- Negative Male for AMI  >=14 - " Abnormal Female indicating possible myocardial injury.  >=22 - Abnormal Male indicating possible myocardial injury.   Clinicians would have to utilize clinical acumen, EKG, Troponin, and serial changes to determine if it is an Acute Myocardial Infarction or myocardial injury due to an underlying chronic condition.         Comprehensive Metabolic Panel [[id number]]  (Abnormal) Collected: 03/27/24 2327    Specimen: Blood Updated: 03/27/24 2353     Glucose 164 mg/dL      BUN 34 mg/dL      Creatinine 0.46 mg/dL      Sodium 140 mmol/L      Potassium 4.0 mmol/L      Comment: Slight hemolysis detected by analyzer. Result may be falsely elevated.        Chloride 101 mmol/L      CO2 27.0 mmol/L      Calcium 9.6 mg/dL      Total Protein 6.8 g/dL      Albumin 3.9 g/dL      ALT (SGPT) 15 U/L      AST (SGOT) 17 U/L      Alkaline Phosphatase 68 U/L      Total Bilirubin 0.4 mg/dL      Globulin 2.9 gm/dL      A/G Ratio 1.3 g/dL      BUN/Creatinine Ratio 73.9     Anion Gap 12.0 mmol/L      eGFR 119.7 mL/min/1.73     Narrative:      GFR Normal >60  Chronic Kidney Disease <60  Kidney Failure <15      Magnesium [[id number]]  (Normal) Collected: 03/27/24 2327    Specimen: Blood Updated: 03/27/24 2353     Magnesium 2.2 mg/dL     Blood Culture - Blood, Arm, Right [[id number]] Collected: 03/27/24 2336    Specimen: Blood from Arm, Right Updated: 03/27/24 2347    Blood Culture - Blood, Arm, Right [[id number]] Collected: 03/27/24 2327    Specimen: Blood from Arm, Right Updated: 03/27/24 2346    CBC & Differential [[id number]]  (Abnormal) Collected: 03/27/24 2327    Specimen: Blood Updated: 03/27/24 2333    Narrative:      The following orders were created for panel order CBC & Differential.  Procedure                               Abnormality         Status                     ---------                               -----------         ------                     CBC Auto Differential[[id number]]        Abnormal            Final result                  Please view results for these tests on the individual orders.    CBC Auto Differential [[id number]]  (Abnormal) Collected: 03/27/24 2327    Specimen: Blood Updated: 03/27/24 2333     WBC 9.61 10*3/mm3      RBC 3.31 10*6/mm3      Hemoglobin 10.2 g/dL      Hematocrit 31.5 %      MCV 95.2 fL      MCH 30.8 pg      MCHC 32.4 g/dL      RDW 13.1 %      RDW-SD 44.8 fl      MPV 11.3 fL      Platelets 229 10*3/mm3      Neutrophil % 74.4 %      Lymphocyte % 15.7 %      Monocyte % 5.4 %      Eosinophil % 4.0 %      Basophil % 0.2 %      Immature Grans % 0.3 %      Neutrophils, Absolute 7.15 10*3/mm3      Lymphocytes, Absolute 1.51 10*3/mm3      Monocytes, Absolute 0.52 10*3/mm3      Eosinophils, Absolute 0.38 10*3/mm3      Basophils, Absolute 0.02 10*3/mm3      Immature Grans, Absolute 0.03 10*3/mm3      nRBC 0.0 /100 WBC           Imaging Results (Last 24 Hours)       Procedure Component Value Units Date/Time    XR Chest 1 View [[id number]] Resulted: 03/27/24 2331     Updated: 03/27/24 2332          I have personally reviewed and interpreted the radiology studies and ECG obtained at time of admission.     Assessment / Plan   Assessment:   Active Hospital Problems    Diagnosis     **UTI (urinary tract infection)        Treatment Plan  The patient will be admitted to my service here at [facility].     Medical Decision Making  Number and Complexity of problems: High  Differential Diagnosis:   # UTI  # Sepsis secondary to above  - Admit to ICU  - Upon review of past history and UTIs will start Zosyn as most however her past UTIs have been susceptible to Zosyn  - Continue IV fluids LR at 75 cc/h  - Follow-up blood and urine cultures  - If blood pressure continues to stay stable can likely transfer out of the ICU  - Likely discharge back to nursing home  - Patient  did mention to ER provider that he felt that the patient would not prefer to be in her condition currently so a goals of care discussion may need to  be had with the  in detail.    Conditions and Status        Condition is unchanged.     MDM Data  External documents reviewed: None  Cardiac tracing (EKG, telemetry) interpretation: Normal sinus rhythm  Radiology interpretation: Pending  Labs reviewed: Yes  Any tests that were considered but not ordered: No     Decision rules/scores evaluated (example [id number], [place], etc): N/A     Discussed with:      Care Planning  Shared decision making:   Code status and discussions: DNR/DNI    Disposition  Social Determinants of Health that impact treatment or disposition: Quadriplegic  Estimated length of stay is 2+ days.     I confirmed that the patient's advanced care plan is present, code status is documented, and a surrogate decision maker is listed in the patient's medical record.     The patient's surrogate decision maker is .     The patient was seen and examined by me on 3/28/2024 at 0230.    Electronically signed by Carter Kellogg DO, 03/28/24, 02:30 CDT.

## 2024-03-28 NOTE — CASE MANAGEMENT/SOCIAL WORK
Discharge Planning Assessment  [facility]     Patient Name: Namita Zabala  MRN: [id number]  Today's Date: 3/28/2024    Admit Date: 3/27/2024        Discharge Needs Assessment       Row Name 03/28/24 1202       Living Environment    People in Home facility resident    [facility]    Current Living Arrangements extended care facility    Potentially Unsafe Housing Conditions none    In the past 12 months has the electric, gas, oil, or water company threatened to shut off services in your home? No    Provides Primary Care For no one    Quality of Family Relationships supportive;helpful;involved    Able to Return to Prior Arrangements yes       Resource/Environmental Concerns    Resource/Environmental Concerns none    Transportation Concerns none       Transportation Needs    In the past 12 months, has lack of transportation kept you from meetings, work, or from getting things needed for daily living? No       Food Insecurity    Within the past 12 months, you worried that your food would run out before you got the money to buy more. Never true    Within the past 12 months, the food you bought just didn't last and you didn't have money to get more. Never true       Transition Planning    Patient/Family Anticipates Transition to long-term care facility    Patient/Family Anticipated Services at Transition skilled nursing    Transportation Anticipated health plan transportation       Discharge Needs Assessment    Readmission Within the Last 30 Days no previous admission in last 30 days    Current Outpatient/Agency/Support Group skilled nursing facility    Concerns to be Addressed care coordination/care conferences;discharge planning    Anticipated Changes Related to Illness none    Equipment Needed After Discharge none    Outpatient/Agency/Support Group Needs skilled nursing facility    Discharge Facility/Level of Care Needs nursing facility, skilled    Discharge Coordination/Progress Patient  is a resident at [facility].  MARIO has reached out to Awilda in admissions at [facility] to inquire about status of bed, awaiting response.  Tentative plan is to return to [facility] upon discharge.                   Discharge Plan    No documentation.                 Continued Care and Services - Admitted Since 3/27/2024    No active coordination exists for this encounter.       Selected Continued Care - Prior Encounters Includes continued care and service providers with selected services from prior encounters from 12/28/2023 to 3/28/2024      Discharged on 2/19/2024 Admission date: 2/7/2024 - Discharge disposition: Intermediate Care       Destination       Service Provider Selected Services Address Phone Fax Patient Preferred    [facility] [address] [place] [address] [phone] --                             Demographic Summary    No documentation.                  Functional Status    No documentation.                  Psychosocial    No documentation.                  Abuse/Neglect    No documentation.                  Legal    No documentation.                  Substance Abuse    No documentation.                  Patient Forms    No documentation.                     CAMILA Gonzalez

## 2024-03-28 NOTE — ED NOTES
Nursing report ED to floor  Namita Zabala  48 y.o.  female    HPI:   Chief Complaint   Patient presents with    Wellness Check       Admitting doctor:   Carter Kellogg DO    Consulting provider(s):  Consults       No orders found for last 30 day(s).             Admitting diagnosis:   The primary encounter diagnosis was Elevated troponin I level. Diagnoses of Urinary tract infection associated with indwelling urethral catheter, initial encounter, Lactic acidosis, and Hypotension, unspecified hypotension type were also pertinent to this visit.    Code status:   Current Code Status       Date Active Code Status Order ID Comments User Context       Prior            Allergies:   Coconut, Levaquin [levofloxacin], Nuts, Penicillins, and [place]    Intake and Output    Intake/Output Summary (Last 24 hours) at 3/28/2024 0130  Last data filed at 3/28/2024 0105  Gross per 24 hour   Intake 1000 ml   Output --   Net 1000 ml       Weight:       03/27/24  2310   Weight: 67.1 kg (148 lb)       Most recent vitals:   Vitals:    03/28/24 0016 03/28/24 0031 03/28/24 0046 03/28/24 0101   BP: (!) 73/49 (!) 76/52 (!) 73/49 (!) 86/63   Pulse: 61 61 62 79   Resp:       Temp:       TempSrc:       SpO2: 97% 100% 98% 99%   Weight:       Height:         Oxygen Therapy: .    Active LDAs/IV Access:   Lines, Drains & Airways       Active LDAs       Name Placement date Placement time Site Days    Peripheral IV 03/27/24 2341 Posterior;Right Hand 03/27/24  2341  Hand  less than 1    Nephrostomy Left --  present on assessment  --  present on assessment  Left  --    Gastrostomy/Enterostomy  LUQ 02/08/24  1230  LUQ  48    Urethral Catheter Silicone 16 Fr. 03/27/24  2359  -- less than 1    Surgical Airway Shiley 7.5 --  --  7.5  --                    Labs (abnormal labs have a star):   Labs Reviewed   COMPREHENSIVE METABOLIC PANEL - Abnormal; Notable for the following components:       Result Value    Glucose 164 (*)     BUN 34 (*)      "Creatinine 0.46 (*)     BUN/Creatinine Ratio 73.9 (*)     All other components within normal limits    Narrative:     GFR Normal >60  Chronic Kidney Disease <60  Kidney Failure <15     LACTIC ACID, PLASMA - Abnormal; Notable for the following components:    Lactate 2.2 (*)     All other components within normal limits   CBC WITH AUTO DIFFERENTIAL - Abnormal; Notable for the following components:    RBC 3.31 (*)     Hemoglobin 10.2 (*)     Hematocrit 31.5 (*)     Lymphocyte % 15.7 (*)     Neutrophils, Absolute 7.15 (*)     All other components within normal limits   SINGLE HS TROPONIN T - Abnormal; Notable for the following components:    HS Troponin T 86 (*)     All other components within normal limits    Narrative:     High Sensitive Troponin T Reference Range:  <14.0 ng/L- Negative Female for AMI  <22.0 ng/L- Negative Male for AMI  >=14 - Abnormal Female indicating possible myocardial injury.  >=22 - Abnormal Male indicating possible myocardial injury.   Clinicians would have to utilize clinical acumen, EKG, Troponin, and serial changes to determine if it is an Acute Myocardial Infarction or myocardial injury due to an underlying chronic condition.        PROCALCITONIN - Abnormal; Notable for the following components:    Procalcitonin 0.85 (*)     All other components within normal limits    Narrative:     As a Marker for Sepsis (Non-Neonates):    1. <0.5 ng/mL represents a low risk of severe sepsis and/or septic shock.  2. >2 ng/mL represents a high risk of severe sepsis and/or septic shock.    As a Marker for Lower Respiratory Tract Infections that require antibiotic therapy:    PCT on Admission    Antibiotic Therapy       6-12 Hrs later    >0.5                Strongly Recommended  >0.25 - <0.5        Recommended   0.1 - 0.25          Discouraged              Remeasure/reassess PCT  <0.1                Strongly Discouraged     Remeasure/reassess PCT    As 28 day mortality risk marker: \"Change in Procalcitonin " "Result\" (>80% or <=80%) if Day 0 (or Day 1) and Day 4 values are available. Refer to http://www.[facility]-pct-calculator.com    Change in PCT <=80%  A decrease of PCT levels below or equal to 80% defines a positive change in PCT test result representing a higher risk for 28-day all-cause mortality of patients diagnosed with severe sepsis for septic shock.    Change in PCT >80%  A decrease of PCT levels of more than 80% defines a negative change in PCT result representing a lower risk for 28-day all-cause mortality of patients diagnosed with severe sepsis or septic shock.      URINALYSIS W/ CULTURE IF INDICATED - Abnormal; Notable for the following components:    Color, UA Dark Yellow (*)     Appearance, UA Turbid (*)     Blood, UA Large (3+) (*)     Protein,  mg/dL (2+) (*)     Leuk Esterase, UA Large (3+) (*)     Nitrite, UA Positive (*)     All other components within normal limits    Narrative:     In absence of clinical symptoms, the presence of pyuria, bacteria, and/or nitrites on the urinalysis result does not correlate with infection.   URINALYSIS, MICROSCOPIC ONLY - Abnormal; Notable for the following components:    RBC, UA 3-5 (*)     WBC, UA Too Numerous to Count (*)     Bacteria, UA 3+ (*)     Squamous Epithelial Cells, UA 3-6 (*)     All other components within normal limits   MAGNESIUM - Normal   BLOOD CULTURE   BLOOD CULTURE   URINE CULTURE   RAINBOW DRAW    Narrative:     The following orders were created for panel order [place] Draw.  Procedure                               Abnormality         Status                     ---------                               -----------         ------                     Green Top (Gel)[[id number]]                                  Final result               Lavender Top[[id number]]                                     Final result               Red Top[[id number]]                                          Final result               Light Blue Top[[id number]]            "                        Final result                 Please view results for these tests on the individual orders.   HIGH SENSITIVITIY TROPONIN T 2HR   LACTIC ACID, REFLEX   CBC AND DIFFERENTIAL    Narrative:     The following orders were created for panel order CBC & Differential.  Procedure                               Abnormality         Status                     ---------                               -----------         ------                     CBC Auto Differential[[id number]]        Abnormal            Final result                 Please view results for these tests on the individual orders.   GREEN TOP   LAVENDER TOP   RED TOP   LIGHT BLUE TOP       Meds given in ED:   Medications   sodium chloride 0.9 % flush 10 mL (has no administration in time range)   piperacillin-tazobactam (ZOSYN) 3.375 g IVPB in 100 mL NS MBP (CD) (3.375 g Intravenous New Bag 3/28/24 0118)   sodium chloride 0.9 % bolus 1,000 mL (1,000 mL Intravenous New Bag 3/28/24 0118)   sodium chloride 0.9 % bolus 1,000 mL (0 mL Intravenous Stopped 3/28/24 0105)           NIH Stroke Scale:       Isolation/Infection(s):  No active isolations   ESBL Klebsiella, ESBL E coli     COVID Testing  Collected .  Resulted .    Nursing report ED to floor:  Mental status: .non verbal  Ambulatory status: .bed ridden  Precautions: .    ED nurse phone extentsion- .. [phone]

## 2024-03-28 NOTE — ED PROVIDER NOTES
Subjective   History of Present Illness  Namita is a 48-year-old female who presents to the ED from the nursing home for concerns of possible aspiration.  Patient's  states he was called as that she had a low pulse and low blood pressure and her head was tilted straight back as opposed to its normal tilted to the side.  Patient presents to the ED at her baseline she is hypotensive at 76/52 and has a heart rate of 62 it is sinus rhythm.  Patient is slightly diaphoretic, she is contracted did bilateral lower and upper extremities.  Patient is indwelling Mojica.        Review of Systems   All other systems reviewed and are negative.      Past Medical History:   Diagnosis Date    Acute kidney failure, unspecified     Angina at rest     Anoxic brain damage, not elsewhere classified     Chronic pulmonary embolism     Chronic respiratory failure, unspecified whether with hypoxia or hypercapnia     Dependence on respirator (ventilator) status     Gastrointestinal hemorrhage, unspecified     Hypercalcemia     Iron deficiency anemia     Metabolic encephalopathy     Migraine     Sees Pain Management for injections.     MVP (mitral valve prolapse)     Other dysphagia     Other pulmonary embolism with acute cor pulmonale     Renal disorder     Ruptured bladder     Syncope     Urinary tract infection, site not specified        Allergies   Allergen Reactions    Coconut Unknown - High Severity    Levaquin [Levofloxacin] Other (See Comments)     unknown    Nuts Unknown - High Severity    Penicillins Rash     Patient's father noted patient had taken penicillin, many years ago, many times and then started developing a rash when she would take it.  She has received cefepime, ceftriaxone and cephalexin with no known adverse problems    [place] Other (See Comments)     Causes migraines per pt reports       Past Surgical History:   Procedure Laterality Date    ABDOMINAL SURGERY      BREAST BIOPSY Right 2011    benign    GTUBE  INSERTION      INSERTION HEMODIALYSIS CATHETER Left 09/16/2021    Procedure: HEMODIALYSIS CATHETER INSERTION;  Surgeon: Anders Kaur MD;  Location: [facility] OR 12;  Service: Vascular;  Laterality: Left;    TONSILLECTOMY      TRACHEOSTOMY         Family History   Problem Relation Age of Onset    Colon cancer Maternal Aunt 52    Fibromyalgia Mother     Heart disease Mother     Hypertension Mother     Hodgkin's lymphoma Paternal Grandmother     Ovarian cancer Neg Hx     Breast cancer Neg Hx        Social History     Socioeconomic History    Marital status:    Tobacco Use    Smoking status: Never    Smokeless tobacco: Never   Vaping Use    Vaping status: Never Used   Substance and Sexual Activity    Alcohol use: No    Drug use: No           Objective   Physical Exam  Vitals reviewed.   Constitutional:       Appearance: Normal appearance. She is normal weight. She is not toxic-appearing.   HENT:      Head: Normocephalic and atraumatic.      Right Ear: External ear normal.      Left Ear: External ear normal.      Nose: Nose normal.      Mouth/Throat:      Mouth: Mucous membranes are moist. Mucous membranes are dry.      Pharynx: Oropharynx is clear.   Eyes:      Extraocular Movements: Extraocular movements intact.      Conjunctiva/sclera: Conjunctivae normal.      Pupils: Pupils are equal, round, and reactive to light.   Cardiovascular:      Rate and Rhythm: Normal rate and regular rhythm.      Pulses: Normal pulses.      Heart sounds: Normal heart sounds.   Pulmonary:      Effort: Pulmonary effort is normal.      Breath sounds: Normal breath sounds.   Abdominal:      General: Bowel sounds are normal.      Palpations: Abdomen is soft.   Musculoskeletal:      Cervical back: Normal range of motion and neck supple. No rigidity.      Comments: Contractured extremities   Skin:     General: Skin is warm.      Capillary Refill: Capillary refill takes less than 2 seconds.      Comments: Diaphoretic    Neurological:      General: No focal deficit present.      Mental Status: She is oriented to person, place, and time.   Psychiatric:         Mood and Affect: Mood normal.         Procedures           ED Course                                             Medical Decision Making  Namita is a 48-year-old female who presents to the ED over concerns of a possible aspiration pneumonia and stable vital signs.  Upon presentation she has a stable heart rate she is slightly hypotensive does not have any accessory breath sounds or accessory muscle use.  Patient's head is tilted now to the right which is not her normal according to her  at bedside.  Patient with labs and imaging to evaluate for sepsis secondary to an aspiration pneumonia or some sort of respiratory compromise.    Patient will have liter IV fluids as well.  ---    Urine returned suggestive of UTI; confirmed w nursing that stratton was changed prior to Urine collection.   Previous Ucx 1/29/24 shows 3 organisms susceptible to Zosyn; however pt has pcn allergy.   2/2 to allergy discussed case w pharmacist who suggests zosyn    Discussed case w hospitalist who agrees to accept admission.   We discussed troponin and agree likely 2/2 demand and pt will trend and hold off on anticoagulation for now.     EKG was obtained  Reveals sinus rhythm with a ventricular rate of 82, no acute ST changes to signify ischemia, normal axis normal intervals.  ----    Labs Reviewed  COMPREHENSIVE METABOLIC PANEL - Abnormal; Notable for the following components:     Glucose                       164 (*)                BUN                           34 (*)                 Creatinine                    0.46 (*)               BUN/Creatinine Ratio          73.9 (*)            All other components within normal limits         Narrative: GFR Normal >60                  Chronic Kidney Disease <60                  Kidney Failure <15                    LACTIC ACID, PLASMA - Abnormal;  Notable for the following components:     Lactate                       2.2 (*)             All other components within normal limits  CBC WITH AUTO DIFFERENTIAL - Abnormal; Notable for the following components:     RBC                           3.31 (*)               Hemoglobin                    10.2 (*)               Hematocrit                    31.5 (*)               Lymphocyte %                  15.7 (*)               Neutrophils, Absolute         7.15 (*)            All other components within normal limits  SINGLE HS TROPONIN T - Abnormal; Notable for the following components:     HS Troponin T                 86 (*)              All other components within normal limits         Narrative: High Sensitive Troponin T Reference Range:                  <14.0 ng/L- Negative Female for AMI                  <22.0 ng/L- Negative Male for AMI                  >=14 - Abnormal Female indicating possible myocardial injury.                  >=22 - Abnormal Male indicating possible myocardial injury.                   Clinicians would have to utilize clinical acumen, EKG, Troponin, and serial changes to determine if it is an Acute Myocardial Infarction or myocardial injury due to an underlying chronic condition.                                       PROCALCITONIN - Abnormal; Notable for the following components:     Procalcitonin                 0.85 (*)            All other components within normal limits         Narrative: As a Marker for Sepsis (Non-Neonates):                                    1. <0.5 ng/mL represents a low risk of severe sepsis and/or septic shock.                  2. >2 ng/mL represents a high risk of severe sepsis and/or septic shock.                                    As a Marker for Lower Respiratory Tract Infections that require antibiotic therapy:                                    PCT on Admission    Antibiotic Therapy       6-12 Hrs later                                    >0.5                 "Strongly Recommended                  >0.25 - <0.5        Recommended                   0.1 - 0.25          Discouraged              Remeasure/reassess PCT                  <0.1                Strongly Discouraged     Remeasure/reassess PCT                                    As 28 day mortality risk marker: \"Change in Procalcitonin Result\" (>80% or <=80%) if Day 0 (or Day 1) and Day 4 values are available. Refer to http://www.[facility]-pct-calculator.com                                    Change in PCT <=80%                  A decrease of PCT levels below or equal to 80% defines a positive change in PCT test result representing a higher risk for 28-day all-cause mortality of patients diagnosed with severe sepsis for septic shock.                                    Change in PCT >80%                  A decrease of PCT levels of more than 80% defines a negative change in PCT result representing a lower risk for 28-day all-cause mortality of patients diagnosed with severe sepsis or septic shock.                     URINALYSIS W/ CULTURE IF INDICATED - Abnormal; Notable for the following components:     Color, UA                     Dark Yellow (*)               Appearance, UA                Turbid (*)               Blood, UA                     Large (3+) (*)               Protein, UA                     (*)                  Leuk Esterase, UA             Large (3+) (*)               Nitrite, UA                   Positive (*)            All other components within normal limits         Narrative: In absence of clinical symptoms, the presence of pyuria, bacteria, and/or nitrites on the urinalysis result does not correlate with infection.  URINALYSIS, MICROSCOPIC ONLY - Abnormal; Notable for the following components:     RBC, UA                       3-5 (*)                WBC, UA                         (*)                  Bacteria, UA                  3+ (*)                 Squamous Epithelial Cells, UA   3-6 (*)          "    All other components within normal limits  MAGNESIUM - Normal  BLOOD CULTURE  BLOOD CULTURE  URINE CULTURE  RAINBOW DRAW         Narrative: The following orders were created for panel order [place] Draw.                  Procedure                               Abnormality         Status                                     ---------                               -----------         ------                                     Green Top (Gel)[[id number]]                                  Final result                               Lavender Top[[id number]]                                     Final result                               Red Top[[id number]]                                          Final result                               Light Blue Top[[id number]]                                   Final result                                                 Please view results for these tests on the individual orders.  HIGH SENSITIVITIY TROPONIN T 2HR  LACTIC ACID, REFLEX  CBC AND DIFFERENTIAL         Narrative: The following orders were created for panel order CBC & Differential.                  Procedure                               Abnormality         Status                                     ---------                               -----------         ------                                     CBC Auto Differential[[id number]]        Abnormal            Final result                                                 Please view results for these tests on the individual orders.  GREEN TOP  LAVENDER TOP  RED TOP  LIGHT BLUE TOP    XR Chest 1 View    (Results Pending)      Amount and/or Complexity of Data Reviewed  Labs: ordered.  Radiology: ordered.  ECG/medicine tests: ordered.    Risk  Prescription drug management.        Final diagnoses:   Elevated troponin I level   Urinary tract infection associated with indwelling urethral catheter, initial encounter   Lactic acidosis   Hypotension, unspecified hypotension type       ED  Disposition  ED Disposition       ED Disposition   Decision to Admit    Condition   --    Comment   Level of Care: Critical Care [6]   Diagnosis: UTI (urinary tract infection) [405284]   Admitting Physician: NEVA ELIZONDO [[id number]]   Certification: I Certify That Inpatient Hospital Services Are Medically Necessary For Greater Than 2 Midnights                 No follow-up provider specified.       Medication List      No changes were made to your prescriptions during this visit.            Mauro Robles MD  03/28/24 0102

## 2024-03-28 NOTE — PLAN OF CARE
Problem: Skin Injury Risk Increased  Goal: Skin Health and Integrity  Outcome: Ongoing, Progressing  Intervention: Optimize Skin Protection  Recent Flowsheet Documentation  Taken 3/28/2024 0400 by Shruthi Adan RN  Head of Bed (HOB) Positioning: HOB elevated  Taken 3/28/2024 0200 by Shruthi Adan RN  Head of Bed (HOB) Positioning: HOB elevated     Problem: Adult Inpatient Plan of Care  Goal: Plan of Care Review  Outcome: Ongoing, Progressing  Goal: Patient-Specific Goal (Individualized)  Outcome: Ongoing, Progressing  Goal: Absence of Hospital-Acquired Illness or Injury  Outcome: Ongoing, Progressing  Intervention: Identify and Manage Fall Risk  Recent Flowsheet Documentation  Taken 3/28/2024 0600 by Shruthi Adan RN  Safety Promotion/Fall Prevention: safety round/check completed  Taken 3/28/2024 0500 by Shruthi Adan RN  Safety Promotion/Fall Prevention: safety round/check completed  Taken 3/28/2024 0400 by Shruthi Adan RN  Safety Promotion/Fall Prevention: safety round/check completed  Taken 3/28/2024 0300 by Shruthi Adan RN  Safety Promotion/Fall Prevention: safety round/check completed  Taken 3/28/2024 0200 by Shruthi Adan RN  Safety Promotion/Fall Prevention: safety round/check completed  Intervention: Prevent Skin Injury  Recent Flowsheet Documentation  Taken 3/28/2024 0600 by Shruthi Adan RN  Body Position:   turned   supine  Taken 3/28/2024 0400 by Shruthi Adan RN  Body Position:   turned   left  Taken 3/28/2024 0200 by Shruthi Adan RN  Body Position:   turned   right  Intervention: Prevent and Manage VTE (Venous Thromboembolism) Risk  Recent Flowsheet Documentation  Taken 3/28/2024 0400 by Shruthi Adan RN  Activity Management: bedrest  VTE Prevention/Management: other (see comments)  Taken 3/28/2024 0200 by Shruthi Adan RN  Activity Management: bedrest  VTE Prevention/Management: (see MAR) other (see comments)  Goal: Optimal Comfort and Wellbeing  Outcome:  Ongoing, Progressing  Intervention: Provide Person-Centered Care  Recent Flowsheet Documentation  Taken 3/28/2024 0400 by Shruthi Adan RN  Trust Relationship/Rapport:   care explained   choices provided  Taken 3/28/2024 0200 by Shruthi Adan RN  Trust Relationship/Rapport:   care explained   choices provided  Goal: Readiness for Transition of Care  Outcome: Ongoing, Progressing     Problem: Adjustment to Illness (Sepsis/Septic Shock)  Goal: Optimal Coping  Outcome: Ongoing, Progressing  Intervention: Optimize Psychosocial Adjustment to Illness  Recent Flowsheet Documentation  Taken 3/28/2024 0400 by Shruthi Adan RN  Family/Support System Care: support provided  Taken 3/28/2024 0200 by Shruthi Adan RN  Family/Support System Care: support provided     Problem: Bleeding (Sepsis/Septic Shock)  Goal: Absence of Bleeding  Outcome: Ongoing, Progressing     Problem: Glycemic Control Impaired (Sepsis/Septic Shock)  Goal: Blood Glucose Level Within Desired Range  Outcome: Ongoing, Progressing     Problem: Infection Progression (Sepsis/Septic Shock)  Goal: Absence of Infection Signs and Symptoms  Outcome: Ongoing, Progressing  Intervention: Promote Recovery  Recent Flowsheet Documentation  Taken 3/28/2024 0400 by Shruthi Adan RN  Activity Management: bedrest  Taken 3/28/2024 0200 by Shruthi Adan RN  Activity Management: bedrest     Problem: Nutrition Impaired (Sepsis/Septic Shock)  Goal: Optimal Nutrition Intake  Outcome: Ongoing, Progressing   Goal Outcome Evaluation:

## 2024-03-29 LAB
ANION GAP SERPL CALCULATED.3IONS-SCNC: 13 MMOL/L (ref 5–15)
BUN SERPL-MCNC: 19 MG/DL (ref 6–20)
BUN/CREAT SERPL: 46.3 (ref 7–25)
CALCIUM SPEC-SCNC: 9.3 MG/DL (ref 8.6–10.5)
CHLORIDE SERPL-SCNC: 104 MMOL/L (ref 98–107)
CO2 SERPL-SCNC: 23 MMOL/L (ref 22–29)
CREAT SERPL-MCNC: 0.41 MG/DL (ref 0.57–1)
DEPRECATED RDW RBC AUTO: 44.7 FL (ref 37–54)
EGFRCR SERPLBLD CKD-EPI 2021: 123.1 ML/MIN/1.73
ERYTHROCYTE [DISTWIDTH] IN BLOOD BY AUTOMATED COUNT: 12.9 % (ref 12.3–15.4)
GLUCOSE BLDC GLUCOMTR-MCNC: 108 MG/DL (ref 70–130)
GLUCOSE BLDC GLUCOMTR-MCNC: 114 MG/DL (ref 70–130)
GLUCOSE BLDC GLUCOMTR-MCNC: 116 MG/DL (ref 70–130)
GLUCOSE BLDC GLUCOMTR-MCNC: 132 MG/DL (ref 70–130)
GLUCOSE SERPL-MCNC: 103 MG/DL (ref 65–99)
HCT VFR BLD AUTO: 31.1 % (ref 34–46.6)
HGB BLD-MCNC: 10 G/DL (ref 12–15.9)
MCH RBC QN AUTO: 30.4 PG (ref 26.6–33)
MCHC RBC AUTO-ENTMCNC: 32.2 G/DL (ref 31.5–35.7)
MCV RBC AUTO: 94.5 FL (ref 79–97)
PLATELET # BLD AUTO: 179 10*3/MM3 (ref 140–450)
PMV BLD AUTO: 11.9 FL (ref 6–12)
POTASSIUM SERPL-SCNC: 4.1 MMOL/L (ref 3.5–5.2)
RBC # BLD AUTO: 3.29 10*6/MM3 (ref 3.77–5.28)
SODIUM SERPL-SCNC: 140 MMOL/L (ref 136–145)
WBC NRBC COR # BLD AUTO: 6.33 10*3/MM3 (ref 3.4–10.8)

## 2024-03-29 PROCEDURE — 25010000002 ENOXAPARIN PER 10 MG: Performed by: FAMILY MEDICINE

## 2024-03-29 PROCEDURE — 80048 BASIC METABOLIC PNL TOTAL CA: CPT | Performed by: FAMILY MEDICINE

## 2024-03-29 PROCEDURE — 25010000002 PIPERACILLIN SOD-TAZOBACTAM PER 1 G: Performed by: FAMILY MEDICINE

## 2024-03-29 PROCEDURE — 82948 REAGENT STRIP/BLOOD GLUCOSE: CPT

## 2024-03-29 PROCEDURE — 94761 N-INVAS EAR/PLS OXIMETRY MLT: CPT

## 2024-03-29 PROCEDURE — 85027 COMPLETE CBC AUTOMATED: CPT | Performed by: FAMILY MEDICINE

## 2024-03-29 PROCEDURE — 25010000002 PIPERACILLIN SOD-TAZOBACTAM PER 1 G: Performed by: STUDENT IN AN ORGANIZED HEALTH CARE EDUCATION/TRAINING PROGRAM

## 2024-03-29 PROCEDURE — 94799 UNLISTED PULMONARY SVC/PX: CPT

## 2024-03-29 RX ORDER — TIZANIDINE 4 MG/1
8 TABLET ORAL EVERY 8 HOURS SCHEDULED
Status: DISCONTINUED | OUTPATIENT
Start: 2024-03-29 | End: 2024-03-29

## 2024-03-29 RX ORDER — BACLOFEN 10 MG/1
5 TABLET ORAL
Status: DISCONTINUED | OUTPATIENT
Start: 2024-03-29 | End: 2024-04-02 | Stop reason: HOSPADM

## 2024-03-29 RX ORDER — HYDROCODONE BITARTRATE AND ACETAMINOPHEN 5; 325 MG/1; MG/1
1 TABLET ORAL EVERY 4 HOURS
Status: DISCONTINUED | OUTPATIENT
Start: 2024-03-29 | End: 2024-04-02 | Stop reason: HOSPADM

## 2024-03-29 RX ORDER — FLUTICASONE PROPIONATE 50 MCG
2 SPRAY, SUSPENSION (ML) NASAL DAILY
Status: DISCONTINUED | OUTPATIENT
Start: 2024-03-29 | End: 2024-04-02 | Stop reason: HOSPADM

## 2024-03-29 RX ORDER — BACLOFEN 10 MG/1
5 TABLET ORAL EVERY 4 HOURS
Status: DISCONTINUED | OUTPATIENT
Start: 2024-03-29 | End: 2024-03-29

## 2024-03-29 RX ORDER — HYDROCODONE BITARTRATE AND ACETAMINOPHEN 5; 325 MG/1; MG/1
1 TABLET ORAL EVERY 6 HOURS SCHEDULED
Status: DISCONTINUED | OUTPATIENT
Start: 2024-03-29 | End: 2024-03-29

## 2024-03-29 RX ADMIN — HYDROXYZINE HYDROCHLORIDE 25 MG: 25 TABLET ORAL at 21:27

## 2024-03-29 RX ADMIN — PIPERACILLIN SODIUM AND TAZOBACTAM SODIUM 3.38 G: 3; .375 INJECTION, POWDER, LYOPHILIZED, FOR SOLUTION INTRAVENOUS at 09:52

## 2024-03-29 RX ADMIN — Medication 20 MG: at 20:10

## 2024-03-29 RX ADMIN — HYDROCODONE BITARTRATE AND ACETAMINOPHEN 1 TABLET: 5; 325 TABLET ORAL at 20:08

## 2024-03-29 RX ADMIN — HYDROCODONE BITARTRATE AND ACETAMINOPHEN 1 TABLET: 5; 325 TABLET ORAL at 05:24

## 2024-03-29 RX ADMIN — Medication 10 ML: at 09:52

## 2024-03-29 RX ADMIN — PIPERACILLIN SODIUM AND TAZOBACTAM SODIUM 3.38 G: 3; .375 INJECTION, POWDER, LYOPHILIZED, FOR SOLUTION INTRAVENOUS at 17:06

## 2024-03-29 RX ADMIN — Medication 20 MG: at 09:54

## 2024-03-29 RX ADMIN — Medication 20 MG: at 15:44

## 2024-03-29 RX ADMIN — LANSOPRAZOLE 15 MG: 15 TABLET, ORALLY DISINTEGRATING ORAL at 05:24

## 2024-03-29 RX ADMIN — ROSUVASTATIN CALCIUM 5 MG: 5 TABLET, FILM COATED ORAL at 09:52

## 2024-03-29 RX ADMIN — Medication 1 APPLICATION: at 09:52

## 2024-03-29 RX ADMIN — HYDROCODONE BITARTRATE AND ACETAMINOPHEN 1 TABLET: 5; 325 TABLET ORAL at 12:32

## 2024-03-29 RX ADMIN — TIZANIDINE 8 MG: 4 TABLET ORAL at 05:24

## 2024-03-29 RX ADMIN — TIZANIDINE 8 MG: 4 TABLET ORAL at 15:43

## 2024-03-29 RX ADMIN — FLUTICASONE PROPIONATE 2 SPRAY: 50 SPRAY, METERED NASAL at 20:09

## 2024-03-29 RX ADMIN — PIPERACILLIN SODIUM AND TAZOBACTAM SODIUM 3.38 G: 3; .375 INJECTION, POWDER, LYOPHILIZED, FOR SOLUTION INTRAVENOUS at 01:36

## 2024-03-29 RX ADMIN — GUAIFENESIN 200 MG: 200 SOLUTION ORAL at 20:08

## 2024-03-29 RX ADMIN — ENOXAPARIN SODIUM 40 MG: 100 INJECTION SUBCUTANEOUS at 09:52

## 2024-03-29 RX ADMIN — GUAIFENESIN 200 MG: 200 SOLUTION ORAL at 09:52

## 2024-03-29 RX ADMIN — Medication 45 ML: at 10:06

## 2024-03-29 RX ADMIN — CHLORHEXIDINE GLUCONATE 1 APPLICATION: 500 CLOTH TOPICAL at 05:24

## 2024-03-29 RX ADMIN — BACLOFEN 5 MG: 10 TABLET ORAL at 20:08

## 2024-03-29 RX ADMIN — Medication 20 MG: at 03:58

## 2024-03-29 RX ADMIN — Medication 10 ML: at 20:09

## 2024-03-29 NOTE — PROGRESS NOTES
RT EQUIPMENT DEVICE RELATED - SKIN ASSESSMENT    Pritesh Score:  Pritesh Score: 12     RT Medical Equipment/Device:     Tracheostomy - Are sutures present:  No    Skin Assessment:      Neck:  Intact    Device Skin Pressure Protection:  Skin-to-device areas padded:  Trach Tie    Nurse Notification:  No    Lena Crews, RRT

## 2024-03-29 NOTE — PLAN OF CARE
Goal Outcome Evaluation:    Patient alert throughout the night-able to track with eyes. Head tilted to right side and propped up with pillows and rolled blankets. Patient grimacing at times,medicated for pain-IV antibiotics given as ordered. G-tube feeding continues and zero residual at this time. Turn Q2- Mojica inplace- 4 small liquid BMs during the night. Humidified 5L-per trach collar in place. Safety maintained/

## 2024-03-29 NOTE — PROGRESS NOTES
[facility] Medicine Services  INPATIENT PROGRESS NOTE    Patient Name: Namita Zabala  Date of Admission: 3/27/2024  Today's Date: 03/29/24  Length of Stay: 1  Primary Care Physician: David Lara MD    Subjective   Chief Complaint: UTI/sepsis/hypertension/brachycardia.   HPI   Blood pressure stable, afebrile.  White blood cells normal.  Hemoglobin slightly decreased.    Review of Systems   Unable to obtain, patient does not talk.   All pertinent negatives and positives are as above. All other systems have been reviewed and are negative unless otherwise stated.     Objective    Temp:  [97.5 °F (36.4 °C)-99.6 °F (37.6 °C)] 99.1 °F (37.3 °C)  Heart Rate:  [] 93  Resp:  [12-18] 18  BP: ()/(60-89) 138/88  Physical Exam  Vitals and nursing note reviewed.   Constitutional:       Comments: Contracted . chronically ill.     HENT:      Head: Normocephalic.      Comments: Neck lean to the right side, chronic condition.  Eyes:      Conjunctiva/sclera: Conjunctivae normal.      Pupils: Pupils are equal, round, and reactive to light.   Cardiovascular:      Rate and Rhythm: Regular rhythm.  Heart rates in the 90.     Heart sounds: Normal heart sounds.   Pulmonary:      Effort: No respiratory distress.      Comments: Trach in place.  Diminished breath sound bilateral, clear .  Abdominal:      General: Bowel sounds are normal. There is no distension.      Palpations: Abdomen is soft.      Tenderness: There is no abdominal tenderness.  Gastric tube in place.  Nephrectomy tube in place left side.  Musculoskeletal:         General: No swelling.      Cervical back: Neck supple.      Comments: Contracted all extremities.  . Paraplegic.  Atrophy of the muscle.   Skin:     General: Skin is warm and dry.      Capillary Refill: Capillary refill takes 2 to 3 seconds.      Findings: No rash.   Neurological:      Mental Status: She is alert.             Results Review:  I have  reviewed the labs, radiology results, and diagnostic studies.    Laboratory Data:   Results from last 7 days   Lab Units 03/29/24  0548 03/28/24  0431 03/27/24  2327   WBC 10*3/mm3 6.33 6.92 9.61   HEMOGLOBIN g/dL 10.0* 11.3* 10.2*   HEMATOCRIT % 31.1* 37.4 31.5*   PLATELETS 10*3/mm3 179 202 229        Results from last 7 days   Lab Units 03/29/24  0548 03/28/24  0431 03/27/24  2327   SODIUM mmol/L 140 139 140   POTASSIUM mmol/L 4.1 4.2 4.0   CHLORIDE mmol/L 104 106 101   CO2 mmol/L 23.0 20.0* 27.0   BUN mg/dL 19 28* 34*   CREATININE mg/dL 0.41* 0.35* 0.46*   CALCIUM mg/dL 9.3 8.6 9.6   BILIRUBIN mg/dL  --  0.4 0.4   ALK PHOS U/L  --  62 68   ALT (SGPT) U/L  --  15 15   AST (SGOT) U/L  --  21 17   GLUCOSE mg/dL 103* 89 164*       Culture Data:   Blood Culture   Date Value Ref Range Status   03/27/2024 No growth at 24 hours  Preliminary   03/27/2024 No growth at 24 hours  Preliminary       Radiology Data:   Imaging Results (Last 24 Hours)       ** No results found for the last 24 hours. **            I have reviewed the patient's current medications.     Assessment/Plan   Assessment  Active Hospital Problems    Diagnosis     **UTI (urinary tract infection)     History of anoxic brain injury     Functional quadriplegia     Severe malnutrition     Acute respiratory failure with hypoxia     Sepsis secondary to UTI        Treatment Plan  UTI/sepsis.  Status post 2 L bolus in ER.  Zosyn antibiotics.  Lactated ringer.     Hypotension/brachycardia/hyperlipidemia.    Blood pressure in ER was 76/52.  Tachycardia resolved.  Hypotension resolved. Crestor.     Altered mental status.  Back to baseline.     Chronic respiratory failure.  Tracheotomy in place.  Robaxin.  Scopolamine patch.  Chest x-ray-Left lower lobar consolidation which may represent an acute inflammatory/infectious process, Atelectatic changes in the right lower lung persist.  Currently on 6 L / 28%.     Urinary retention.  Mojica cath in place.      Contracted/deconditioning.  Baclofen .     Reflux . Protonix.     Lovenox prophylaxis.    Nephrectomy tube left side in place.     Nutrition.  G-tube in place.  Nutrition consult for tube feeding.  KUB-G-tube is in good position.      Nursing home patient .     Urine culture pending.  Blood cultures-no growth in 24 hours.      Medical Decision Making  Number and Complexity of problems: UTI/sepsis/hypotension/brachycardia/altered mental status/chronic respiratory failure/chronic Mojica, PEG tube.  Paraplegic.  Differential Diagnosis: None     Conditions and Status        Condition is unchanged.     [facility] Data  External documents reviewed: Previous note .  Cardiac tracing (EKG, telemetry) interpretation: Normal sinus rhythm.  Radiology interpretation: X-ray/KUB  Labs reviewed: Laboratory  Any tests that were considered but not ordered: Laboratory in AM     Decision rules/scores evaluated (example [id number], Wells, etc): None     Discussed with: Nursing     Care Planning  Shared decision making: Nursing  Code status and discussions: DNR.     Disposition  Social Determinants of Health that impact treatment or disposition: From nursing home.  1 to 3 days.    Electronically signed by Amado Villarreal MD, 03/29/24, 09:26 CDT.

## 2024-03-29 NOTE — PLAN OF CARE
Goal Outcome Evaluation:         Pt has been turned and reporsitioned every 2 hours, no skin breakdown noted, FC to BSD, some episodes of loose stool noted. TF per PEG tube, IV anbx's per MD order.  has visited at bedside. Pt with some s/s of discomfort, improved with PRN medications, rescheduled to routine per MD order as per home meds.

## 2024-03-29 NOTE — CASE MANAGEMENT/SOCIAL WORK
Continued Stay Note  [facility]     Patient Name: Namita Zabala  MRN: [id number]  Today's Date: 3/29/2024    Admit Date: 3/27/2024        Discharge Plan       Row Name 03/29/24 1340       Plan    Plan Comments Per admissions at [facility] pt does not have a bed hold but they can accept pt back at [facility].    Final Discharge Disposition Code 03 - skilled nursing facility (SNF)                   Discharge Codes    No documentation.                       SHASHA Turner

## 2024-03-30 LAB
ANION GAP SERPL CALCULATED.3IONS-SCNC: 12 MMOL/L (ref 5–15)
BUN SERPL-MCNC: 15 MG/DL (ref 6–20)
BUN/CREAT SERPL: 33.3 (ref 7–25)
CALCIUM SPEC-SCNC: 9.5 MG/DL (ref 8.6–10.5)
CHLORIDE SERPL-SCNC: 102 MMOL/L (ref 98–107)
CO2 SERPL-SCNC: 23 MMOL/L (ref 22–29)
CREAT SERPL-MCNC: 0.45 MG/DL (ref 0.57–1)
DEPRECATED RDW RBC AUTO: 42.3 FL (ref 37–54)
EGFRCR SERPLBLD CKD-EPI 2021: 120.3 ML/MIN/1.73
ERYTHROCYTE [DISTWIDTH] IN BLOOD BY AUTOMATED COUNT: 13 % (ref 12.3–15.4)
GLUCOSE BLDC GLUCOMTR-MCNC: 100 MG/DL (ref 70–130)
GLUCOSE BLDC GLUCOMTR-MCNC: 120 MG/DL (ref 70–130)
GLUCOSE BLDC GLUCOMTR-MCNC: 141 MG/DL (ref 70–130)
GLUCOSE SERPL-MCNC: 115 MG/DL (ref 65–99)
HCT VFR BLD AUTO: 31.3 % (ref 34–46.6)
HGB BLD-MCNC: 10.5 G/DL (ref 12–15.9)
MCH RBC QN AUTO: 30.7 PG (ref 26.6–33)
MCHC RBC AUTO-ENTMCNC: 33.5 G/DL (ref 31.5–35.7)
MCV RBC AUTO: 91.5 FL (ref 79–97)
PLATELET # BLD AUTO: 216 10*3/MM3 (ref 140–450)
PMV BLD AUTO: 11 FL (ref 6–12)
POTASSIUM SERPL-SCNC: 3.7 MMOL/L (ref 3.5–5.2)
RBC # BLD AUTO: 3.42 10*6/MM3 (ref 3.77–5.28)
SODIUM SERPL-SCNC: 137 MMOL/L (ref 136–145)
WBC NRBC COR # BLD AUTO: 6.71 10*3/MM3 (ref 3.4–10.8)

## 2024-03-30 PROCEDURE — 25010000002 ERTAPENEM PER 500 MG: Performed by: FAMILY MEDICINE

## 2024-03-30 PROCEDURE — 25010000002 PIPERACILLIN SOD-TAZOBACTAM PER 1 G: Performed by: FAMILY MEDICINE

## 2024-03-30 PROCEDURE — 80048 BASIC METABOLIC PNL TOTAL CA: CPT | Performed by: FAMILY MEDICINE

## 2024-03-30 PROCEDURE — 85027 COMPLETE CBC AUTOMATED: CPT | Performed by: FAMILY MEDICINE

## 2024-03-30 PROCEDURE — 82948 REAGENT STRIP/BLOOD GLUCOSE: CPT

## 2024-03-30 PROCEDURE — 25810000003 LACTATED RINGERS PER 1000 ML: Performed by: FAMILY MEDICINE

## 2024-03-30 PROCEDURE — 25010000002 ENOXAPARIN PER 10 MG: Performed by: FAMILY MEDICINE

## 2024-03-30 PROCEDURE — 94799 UNLISTED PULMONARY SVC/PX: CPT

## 2024-03-30 RX ADMIN — Medication 10 ML: at 09:33

## 2024-03-30 RX ADMIN — Medication 10 ML: at 20:47

## 2024-03-30 RX ADMIN — BACLOFEN 5 MG: 10 TABLET ORAL at 04:02

## 2024-03-30 RX ADMIN — HYDROCODONE BITARTRATE AND ACETAMINOPHEN 1 TABLET: 5; 325 TABLET ORAL at 20:46

## 2024-03-30 RX ADMIN — ERTAPENEM 1000 MG: 1 INJECTION INTRAMUSCULAR; INTRAVENOUS at 16:18

## 2024-03-30 RX ADMIN — BACLOFEN 5 MG: 10 TABLET ORAL at 12:35

## 2024-03-30 RX ADMIN — ENOXAPARIN SODIUM 40 MG: 100 INJECTION SUBCUTANEOUS at 09:31

## 2024-03-30 RX ADMIN — GUAIFENESIN 200 MG: 200 SOLUTION ORAL at 09:31

## 2024-03-30 RX ADMIN — Medication 45 ML: at 09:31

## 2024-03-30 RX ADMIN — HYDROCODONE BITARTRATE AND ACETAMINOPHEN 1 TABLET: 5; 325 TABLET ORAL at 04:03

## 2024-03-30 RX ADMIN — ROSUVASTATIN CALCIUM 5 MG: 5 TABLET, FILM COATED ORAL at 09:31

## 2024-03-30 RX ADMIN — LANSOPRAZOLE 15 MG: 15 TABLET, ORALLY DISINTEGRATING ORAL at 06:10

## 2024-03-30 RX ADMIN — HYDROCODONE BITARTRATE AND ACETAMINOPHEN 1 TABLET: 5; 325 TABLET ORAL at 00:08

## 2024-03-30 RX ADMIN — GUAIFENESIN 200 MG: 200 SOLUTION ORAL at 20:47

## 2024-03-30 RX ADMIN — Medication 20 MG: at 04:03

## 2024-03-30 RX ADMIN — PIPERACILLIN SODIUM AND TAZOBACTAM SODIUM 3.38 G: 3; .375 INJECTION, POWDER, LYOPHILIZED, FOR SOLUTION INTRAVENOUS at 10:34

## 2024-03-30 RX ADMIN — BACLOFEN 5 MG: 10 TABLET ORAL at 00:08

## 2024-03-30 RX ADMIN — BACLOFEN 5 MG: 10 TABLET ORAL at 16:21

## 2024-03-30 RX ADMIN — Medication 20 MG: at 16:22

## 2024-03-30 RX ADMIN — HYDROCODONE BITARTRATE AND ACETAMINOPHEN 1 TABLET: 5; 325 TABLET ORAL at 16:21

## 2024-03-30 RX ADMIN — FLUTICASONE PROPIONATE 2 SPRAY: 50 SPRAY, METERED NASAL at 09:33

## 2024-03-30 RX ADMIN — BACLOFEN 5 MG: 10 TABLET ORAL at 09:31

## 2024-03-30 RX ADMIN — Medication 20 MG: at 09:33

## 2024-03-30 RX ADMIN — HYDROCODONE BITARTRATE AND ACETAMINOPHEN 1 TABLET: 5; 325 TABLET ORAL at 12:36

## 2024-03-30 RX ADMIN — SODIUM CHLORIDE, POTASSIUM CHLORIDE, SODIUM LACTATE AND CALCIUM CHLORIDE 75 ML/HR: 600; 310; 30; 20 INJECTION, SOLUTION INTRAVENOUS at 16:22

## 2024-03-30 RX ADMIN — PIPERACILLIN SODIUM AND TAZOBACTAM SODIUM 3.38 G: 3; .375 INJECTION, POWDER, LYOPHILIZED, FOR SOLUTION INTRAVENOUS at 00:21

## 2024-03-30 RX ADMIN — HYDROCODONE BITARTRATE AND ACETAMINOPHEN 1 TABLET: 5; 325 TABLET ORAL at 09:32

## 2024-03-30 RX ADMIN — BACLOFEN 5 MG: 10 TABLET ORAL at 20:47

## 2024-03-30 NOTE — PLAN OF CARE
Goal Outcome Evaluation:     Patient on 6 L/ 28 % trach collar. Chronic Mojica,nephrostomy tube, and PEG tube in place. Tolerating tube feedings and IV ABT. Positive blood cultures reported from lab; MD aware.  at bedside at beginning of shift. Patient appears less agitated with scheduled [place] and Baclofen. NSR-ST on telemetry. Safety maintained.

## 2024-03-30 NOTE — PROGRESS NOTES
[facility] Medicine Services  INPATIENT PROGRESS NOTE    Patient Name: Namita Zabala  Date of Admission: 3/27/2024  Today's Date: 03/30/24  Length of Stay: 2  Primary Care Physician: David Lara MD    Subjective   Chief Complaint: UTI/sepsis/hypertension/brachycardia.   HPI   Hypotension resolved.  Bradycardia resolved.  Tmax 99.9.  T-current 98.8.  Urine culture shows ESBL, change antibiotic to Invanz for now.  Trach in place, oxygen 6 L at 28%.  Hemoglobin stable.    Review of Systems   Unable to obtain, patient does not talk.   All pertinent negatives and positives are as above. All other systems have been reviewed and are negative unless otherwise stated.     Objective    Temp:  [98.8 °F (37.1 °C)-99.9 °F (37.7 °C)] 98.8 °F (37.1 °C)  Heart Rate:  [] 88  Resp:  [16] 16  BP: (121-171)/(67-87) 154/87  Physical Exam  Vitals and nursing note reviewed.   Constitutional:       Comments: Contracted . chronically ill.     HENT:      Head: Normocephalic.      Comments: Neck lean to the right side, chronic condition.  Eyes:      Conjunctiva/sclera: Conjunctivae normal.      Pupils: Pupils are equal, round, and reactive to light.   Cardiovascular:      Rate and Rhythm: Regular rhythm.  Heart rates in the 90.     Heart sounds: Normal heart sounds.   Pulmonary:      Effort: No respiratory distress.      Comments: Trach in place.  Diminished breath sound bilateral, clear .  Abdominal:      General: Bowel sounds are normal. There is no distension.      Palpations: Abdomen is soft.      Tenderness: There is no abdominal tenderness.  Gastric tube in place.  Nephrectomy tube in place left side.  Musculoskeletal:         General: No swelling.      Cervical back: Neck supple.      Comments: Contracted all extremities.  . Paraplegic.  Atrophy of the muscle.   Skin:     General: Skin is warm and dry.      Capillary Refill: Capillary refill takes 2 to 3 seconds.       Findings: No rash.   Neurological:      Mental Status: She is alert.          Results Review:  I have reviewed the labs, radiology results, and diagnostic studies.    Laboratory Data:   Results from last 7 days   Lab Units 03/30/24  0350 03/29/24  0548 03/28/24  0431   WBC 10*3/mm3 6.71 6.33 6.92   HEMOGLOBIN g/dL 10.5* 10.0* 11.3*   HEMATOCRIT % 31.3* 31.1* 37.4   PLATELETS 10*3/mm3 216 179 202        Results from last 7 days   Lab Units 03/30/24  0350 03/29/24  0548 03/28/24  0431 03/27/24  2327   SODIUM mmol/L 137 140 139 140   POTASSIUM mmol/L 3.7 4.1 4.2 4.0   CHLORIDE mmol/L 102 104 106 101   CO2 mmol/L 23.0 23.0 20.0* 27.0   BUN mg/dL 15 19 28* 34*   CREATININE mg/dL 0.45* 0.41* 0.35* 0.46*   CALCIUM mg/dL 9.5 9.3 8.6 9.6   BILIRUBIN mg/dL  --   --  0.4 0.4   ALK PHOS U/L  --   --  62 68   ALT (SGPT) U/L  --   --  15 15   AST (SGOT) U/L  --   --  21 17   GLUCOSE mg/dL 115* 103* 89 164*       Culture Data:   Blood Culture   Date Value Ref Range Status   03/27/2024 No growth at 2 days  Preliminary   03/27/2024 Abnormal Stain (C)  Preliminary     Urine Culture   Date Value Ref Range Status   03/28/2024 >100,000 CFU/mL Escherichia coli ESBL (A)  Preliminary     Comment:       Consider infectious disease consult.  Susceptibility results may not correlate to clinical outcomes.   03/28/2024 <10,000 CFU/mL Proteus mirabilis (A)  Preliminary   03/28/2024 <10,000 CFU/mL Gram Negative Bacilli (A)  Preliminary       Radiology Data:   Imaging Results (Last 24 Hours)       ** No results found for the last 24 hours. **            I have reviewed the patient's current medications.     Assessment/Plan   Assessment  Active Hospital Problems    Diagnosis     **UTI (urinary tract infection)     History of anoxic brain injury     Functional quadriplegia     Severe malnutrition     Acute respiratory failure with hypoxia     Sepsis secondary to UTI        Treatment Plan  UTI/sepsis.  Sepsis resolved.  Status post 2 L bolus in ER.   ESBL from urine culture.  Change Zosyn to Invanz today 3/30/2024.  Lactated ringer.     Hypotension/brachycardia/hyperlipidemia.    Blood pressure in ER was 76/52.  Tachycardia resolved.  Hypotension resolved. Crestor.     Altered mental status.  Back to baseline.     Chronic respiratory failure.  Tracheotomy in place.  Robaxin.  Scopolamine patch.  Chest x-ray-Left lower lobar consolidation which may represent an acute inflammatory/infectious process, Atelectatic changes in the right lower lung persist.  Currently on 6 L / 28%.     Urinary retention.  Mojica cath in place.    Anemia.  Hemoglobin stable.  No sign of acute bleed.    Chronic pain/contracted.  Norco every 4 hours.  Baclofen every 4 hours.     Reflux . Protonix.     Lovenox prophylaxis.     Nephrectomy tube left side in place.     Nutrition.  G-tube in place.  Nutrition consult for tube feeding.  KUB-G-tube is in good position.      Nursing home patient .     Urine culture-ESBL.  Blood cultures-gram-positive bacilli, 1 out of 2 bottles.    Medical Decision Making  Number and Complexity of problems: UTI/sepsis/hypotension/brachycardia/altered mental status/chronic respiratory failure/chronic Mojica, PEG tube.  Paraplegic.  Differential Diagnosis: None     Conditions and Status        Condition is unchanged.     [facility] Data  External documents reviewed: Previous note .  Cardiac tracing (EKG, telemetry) interpretation: Normal sinus rhythm.  Radiology interpretation: X-ray/KUB  Labs reviewed: Laboratory  Any tests that were considered but not ordered: Laboratory in AM     Decision rules/scores evaluated (example [id number], Wells, etc): None     Discussed with: Nursing     Care Planning  Shared decision making: Nursing  Code status and discussions: DNR.     Disposition  Social Determinants of Health that impact treatment or disposition: From nursing home.  1 to 3 days.    Electronically signed by Amado Villarreal MD, 03/30/24, 13:58 CDT

## 2024-03-30 NOTE — PLAN OF CARE
Problem: Skin Injury Risk Increased  Goal: Skin Health and Integrity  Outcome: Ongoing, Progressing     Problem: Fall Injury Risk  Goal: Absence of Fall and Fall-Related Injury  Outcome: Ongoing, Progressing     Problem: Adjustment to Illness (Sepsis/Septic Shock)  Goal: Optimal Coping  Outcome: Ongoing, Progressing     Problem: Bleeding (Sepsis/Septic Shock)  Goal: Absence of Bleeding  Outcome: Ongoing, Progressing     Problem: Glycemic Control Impaired (Sepsis/Septic Shock)  Goal: Blood Glucose Level Within Desired Range  Outcome: Ongoing, Progressing     Problem: Infection Progression (Sepsis/Septic Shock)  Goal: Absence of Infection Signs and Symptoms  Outcome: Ongoing, Progressing     Problem: Nutrition Impaired (Sepsis/Septic Shock)  Goal: Optimal Nutrition Intake  Outcome: Ongoing, Progressing   Goal Outcome Evaluation:

## 2024-03-30 NOTE — PROGRESS NOTES
RT EQUIPMENT DEVICE RELATED - SKIN ASSESSMENT    Pritesh Score:  Pritesh Score: 11     RT Medical Equipment/Device:     Tracheostomy - Are sutures present:  No    Skin Assessment:      Neck:  Intact    Device Skin Pressure Protection:  Skin-to skin areas padded    Nurse Notification:  No    Venessa Henriquez, RRT

## 2024-03-31 LAB
ANION GAP SERPL CALCULATED.3IONS-SCNC: 10 MMOL/L (ref 5–15)
BACTERIA SPEC AEROBE CULT: ABNORMAL
BUN SERPL-MCNC: 11 MG/DL (ref 6–20)
BUN/CREAT SERPL: 31.4 (ref 7–25)
CALCIUM SPEC-SCNC: 9.7 MG/DL (ref 8.6–10.5)
CHLORIDE SERPL-SCNC: 107 MMOL/L (ref 98–107)
CO2 SERPL-SCNC: 22 MMOL/L (ref 22–29)
CREAT SERPL-MCNC: 0.35 MG/DL (ref 0.57–1)
DEPRECATED RDW RBC AUTO: 43 FL (ref 37–54)
EGFRCR SERPLBLD CKD-EPI 2021: 127.8 ML/MIN/1.73
ERYTHROCYTE [DISTWIDTH] IN BLOOD BY AUTOMATED COUNT: 13.1 % (ref 12.3–15.4)
GLUCOSE BLDC GLUCOMTR-MCNC: 108 MG/DL (ref 70–130)
GLUCOSE BLDC GLUCOMTR-MCNC: 108 MG/DL (ref 70–130)
GLUCOSE BLDC GLUCOMTR-MCNC: 124 MG/DL (ref 70–130)
GLUCOSE BLDC GLUCOMTR-MCNC: 134 MG/DL (ref 70–130)
GLUCOSE SERPL-MCNC: 110 MG/DL (ref 65–99)
GRAM STN SPEC: ABNORMAL
HCT VFR BLD AUTO: 32 % (ref 34–46.6)
HGB BLD-MCNC: 10.9 G/DL (ref 12–15.9)
ISOLATED FROM: ABNORMAL
MCH RBC QN AUTO: 30.9 PG (ref 26.6–33)
MCHC RBC AUTO-ENTMCNC: 34.1 G/DL (ref 31.5–35.7)
MCV RBC AUTO: 90.7 FL (ref 79–97)
PLATELET # BLD AUTO: 187 10*3/MM3 (ref 140–450)
PMV BLD AUTO: 11.1 FL (ref 6–12)
POTASSIUM SERPL-SCNC: 3.9 MMOL/L (ref 3.5–5.2)
RBC # BLD AUTO: 3.53 10*6/MM3 (ref 3.77–5.28)
SODIUM SERPL-SCNC: 139 MMOL/L (ref 136–145)
WBC NRBC COR # BLD AUTO: 6.34 10*3/MM3 (ref 3.4–10.8)

## 2024-03-31 PROCEDURE — 80048 BASIC METABOLIC PNL TOTAL CA: CPT | Performed by: FAMILY MEDICINE

## 2024-03-31 PROCEDURE — 85027 COMPLETE CBC AUTOMATED: CPT | Performed by: FAMILY MEDICINE

## 2024-03-31 PROCEDURE — 93005 ELECTROCARDIOGRAM TRACING: CPT | Performed by: FAMILY MEDICINE

## 2024-03-31 PROCEDURE — 94761 N-INVAS EAR/PLS OXIMETRY MLT: CPT

## 2024-03-31 PROCEDURE — 25810000003 LACTATED RINGERS PER 1000 ML: Performed by: FAMILY MEDICINE

## 2024-03-31 PROCEDURE — 25810000003 LACTATED RINGERS SOLUTION: Performed by: STUDENT IN AN ORGANIZED HEALTH CARE EDUCATION/TRAINING PROGRAM

## 2024-03-31 PROCEDURE — 25810000003 LACTATED RINGERS PER 1000 ML: Performed by: STUDENT IN AN ORGANIZED HEALTH CARE EDUCATION/TRAINING PROGRAM

## 2024-03-31 PROCEDURE — 25010000002 ERTAPENEM PER 500 MG: Performed by: FAMILY MEDICINE

## 2024-03-31 PROCEDURE — 82948 REAGENT STRIP/BLOOD GLUCOSE: CPT

## 2024-03-31 PROCEDURE — 93010 ELECTROCARDIOGRAM REPORT: CPT | Performed by: INTERNAL MEDICINE

## 2024-03-31 PROCEDURE — 25010000002 ENOXAPARIN PER 10 MG: Performed by: FAMILY MEDICINE

## 2024-03-31 PROCEDURE — 94799 UNLISTED PULMONARY SVC/PX: CPT

## 2024-03-31 RX ORDER — SODIUM CHLORIDE, SODIUM LACTATE, POTASSIUM CHLORIDE, CALCIUM CHLORIDE 600; 310; 30; 20 MG/100ML; MG/100ML; MG/100ML; MG/100ML
75 INJECTION, SOLUTION INTRAVENOUS CONTINUOUS
Status: DISCONTINUED | OUTPATIENT
Start: 2024-03-31 | End: 2024-04-02

## 2024-03-31 RX ORDER — POLYETHYLENE GLYCOL 3350 17 G/17G
17 POWDER, FOR SOLUTION ORAL DAILY PRN
Status: DISCONTINUED | OUTPATIENT
Start: 2024-03-31 | End: 2024-04-02 | Stop reason: HOSPADM

## 2024-03-31 RX ORDER — AMOXICILLIN 250 MG
2 CAPSULE ORAL NIGHTLY PRN
Status: DISCONTINUED | OUTPATIENT
Start: 2024-03-31 | End: 2024-04-02 | Stop reason: HOSPADM

## 2024-03-31 RX ORDER — BISACODYL 10 MG
10 SUPPOSITORY, RECTAL RECTAL DAILY PRN
Status: DISCONTINUED | OUTPATIENT
Start: 2024-03-31 | End: 2024-04-02 | Stop reason: HOSPADM

## 2024-03-31 RX ADMIN — BACLOFEN 5 MG: 10 TABLET ORAL at 07:53

## 2024-03-31 RX ADMIN — Medication 20 MG: at 15:31

## 2024-03-31 RX ADMIN — SCOPALAMINE 1 PATCH: 1 PATCH, EXTENDED RELEASE TRANSDERMAL at 04:08

## 2024-03-31 RX ADMIN — Medication 10 ML: at 08:01

## 2024-03-31 RX ADMIN — Medication 20 MG: at 04:09

## 2024-03-31 RX ADMIN — GUAIFENESIN 200 MG: 200 SOLUTION ORAL at 08:01

## 2024-03-31 RX ADMIN — ROSUVASTATIN CALCIUM 5 MG: 5 TABLET, FILM COATED ORAL at 08:02

## 2024-03-31 RX ADMIN — HYDROCODONE BITARTRATE AND ACETAMINOPHEN 1 TABLET: 5; 325 TABLET ORAL at 20:00

## 2024-03-31 RX ADMIN — BACLOFEN 5 MG: 10 TABLET ORAL at 04:05

## 2024-03-31 RX ADMIN — HYDROCODONE BITARTRATE AND ACETAMINOPHEN 1 TABLET: 5; 325 TABLET ORAL at 13:48

## 2024-03-31 RX ADMIN — HYDROCODONE BITARTRATE AND ACETAMINOPHEN 1 TABLET: 5; 325 TABLET ORAL at 00:11

## 2024-03-31 RX ADMIN — LANSOPRAZOLE 15 MG: 15 TABLET, ORALLY DISINTEGRATING ORAL at 05:57

## 2024-03-31 RX ADMIN — ENOXAPARIN SODIUM 40 MG: 100 INJECTION SUBCUTANEOUS at 08:01

## 2024-03-31 RX ADMIN — HYDROCODONE BITARTRATE AND ACETAMINOPHEN 1 TABLET: 5; 325 TABLET ORAL at 04:05

## 2024-03-31 RX ADMIN — Medication 20 MG: at 21:48

## 2024-03-31 RX ADMIN — Medication 20 MG: at 09:21

## 2024-03-31 RX ADMIN — Medication 45 ML: at 09:21

## 2024-03-31 RX ADMIN — ERTAPENEM 1000 MG: 1 INJECTION INTRAMUSCULAR; INTRAVENOUS at 14:23

## 2024-03-31 RX ADMIN — GUAIFENESIN 200 MG: 200 SOLUTION ORAL at 20:05

## 2024-03-31 RX ADMIN — SODIUM CHLORIDE, POTASSIUM CHLORIDE, SODIUM LACTATE AND CALCIUM CHLORIDE 500 ML: 600; 310; 30; 20 INJECTION, SOLUTION INTRAVENOUS at 19:50

## 2024-03-31 RX ADMIN — SODIUM CHLORIDE, POTASSIUM CHLORIDE, SODIUM LACTATE AND CALCIUM CHLORIDE 75 ML/HR: 600; 310; 30; 20 INJECTION, SOLUTION INTRAVENOUS at 21:48

## 2024-03-31 RX ADMIN — Medication 10 ML: at 20:05

## 2024-03-31 RX ADMIN — SODIUM CHLORIDE, POTASSIUM CHLORIDE, SODIUM LACTATE AND CALCIUM CHLORIDE 75 ML/HR: 600; 310; 30; 20 INJECTION, SOLUTION INTRAVENOUS at 06:01

## 2024-03-31 RX ADMIN — HYDROCODONE BITARTRATE AND ACETAMINOPHEN 1 TABLET: 5; 325 TABLET ORAL at 17:20

## 2024-03-31 RX ADMIN — BACLOFEN 5 MG: 10 TABLET ORAL at 19:55

## 2024-03-31 RX ADMIN — BACLOFEN 5 MG: 10 TABLET ORAL at 00:10

## 2024-03-31 RX ADMIN — BACLOFEN 5 MG: 10 TABLET ORAL at 13:48

## 2024-03-31 RX ADMIN — HYDROCODONE BITARTRATE AND ACETAMINOPHEN 1 TABLET: 5; 325 TABLET ORAL at 07:53

## 2024-03-31 RX ADMIN — FLUTICASONE PROPIONATE 2 SPRAY: 50 SPRAY, METERED NASAL at 08:01

## 2024-03-31 NOTE — PROGRESS NOTES
[facility] Medicine Services  INPATIENT PROGRESS NOTE    Patient Name: Namita Zabala  Date of Admission: 3/27/2024  Today's Date: 03/31/24  Length of Stay: 3  Primary Care Physician: David Lara MD    Subjective   Chief Complaint: ESBL .  HPI   Blood pressure stable, afebrile.  Ongoing IV hydration.  Hemoglobin stable.  Consult  for arrangement of IV antibiotics at the nursing home.    Review of Systems   Unable to obtain, patient does not talk.   All pertinent negatives and positives are as above. All other systems have been reviewed and are negative unless otherwise stated.     Objective    Temp:  [97.8 °F (36.6 °C)-98.8 °F (37.1 °C)] 97.8 °F (36.6 °C)  Heart Rate:  [60-88] 82  Resp:  [14-16] 16  BP: (136-154)/(70-87) 138/81  Physical Exam  Vitals and nursing note reviewed.   Constitutional:       Comments: Contracted . chronically ill.     HENT:      Head: Normocephalic.      Comments: Neck lean to the right side, chronic condition.  Eyes:      Conjunctiva/sclera: Conjunctivae normal.      Pupils: Pupils are equal, round, and reactive to light.   Cardiovascular:      Rate and Rhythm: Regular rhythm.  Heart rates in the 90.     Heart sounds: Normal heart sounds.   Pulmonary:      Effort: No respiratory distress.      Comments: Trach in place.  Diminished breath sound bilateral, clear .  Abdominal:      General: Bowel sounds are normal. There is no distension.      Palpations: Abdomen is soft.      Tenderness: There is no abdominal tenderness.  Gastric tube in place.  Nephrectomy tube in place left side.  Musculoskeletal:         General: No swelling.      Cervical back: Neck supple.      Comments: Contracted all extremities.  . Paraplegic.  Atrophy of the muscle.   Skin:     General: Skin is warm and dry.      Capillary Refill: Capillary refill takes 2 to 3 seconds.      Findings: No rash.   Neurological:      Mental Status: She is alert.           Results Review:  I have reviewed the labs, radiology results, and diagnostic studies.    Laboratory Data:   Results from last 7 days   Lab Units 03/31/24  0520 03/30/24  0350 03/29/24  0548   WBC 10*3/mm3 6.34 6.71 6.33   HEMOGLOBIN g/dL 10.9* 10.5* 10.0*   HEMATOCRIT % 32.0* 31.3* 31.1*   PLATELETS 10*3/mm3 187 216 179        Results from last 7 days   Lab Units 03/31/24  0418 03/30/24  0350 03/29/24  0548 03/28/24  0431 03/27/24  2327   SODIUM mmol/L 139 137 140 139 140   POTASSIUM mmol/L 3.9 3.7 4.1 4.2 4.0   CHLORIDE mmol/L 107 102 104 106 101   CO2 mmol/L 22.0 23.0 23.0 20.0* 27.0   BUN mg/dL 11 15 19 28* 34*   CREATININE mg/dL 0.35* 0.45* 0.41* 0.35* 0.46*   CALCIUM mg/dL 9.7 9.5 9.3 8.6 9.6   BILIRUBIN mg/dL  --   --   --  0.4 0.4   ALK PHOS U/L  --   --   --  62 68   ALT (SGPT) U/L  --   --   --  15 15   AST (SGOT) U/L  --   --   --  21 17   GLUCOSE mg/dL 110* 115* 103* 89 164*       Culture Data:   Blood Culture   Date Value Ref Range Status   03/27/2024 No growth at 3 days  Preliminary   03/27/2024 Corynebacterium species (C)  Final     Urine Culture   Date Value Ref Range Status   03/28/2024 >100,000 CFU/mL Escherichia coli ESBL (A)  Final     Comment:       Consider infectious disease consult.  Susceptibility results may not correlate to clinical outcomes.   03/28/2024 <10,000 CFU/mL Proteus mirabilis (A)  Final   03/28/2024 <10,000 CFU/mL Klebsiella pneumoniae ESBL (A)  Final     Comment:       Consider infectious disease consult.  Susceptibility results may not correlate to clinical outcomes.       Radiology Data:   Imaging Results (Last 24 Hours)       ** No results found for the last 24 hours. **            I have reviewed the patient's current medications.     Assessment/Plan   Assessment  Active Hospital Problems    Diagnosis     **UTI (urinary tract infection)     History of anoxic brain injury     Functional quadriplegia     Severe malnutrition     Acute respiratory failure with hypoxia      Sepsis secondary to UTI        Treatment Plan  UTI/sepsis.  Sepsis resolved.  Status post 2 L bolus in ER.  ESBL from urine culture.  Change Zosyn to Invanz today 3/30/2024.  Lactated ringer.  She will probably need a midline and IV antibiotics arranged by  before discharge back to nursing home.    Nephrectomy tube left side in place.  Follow-up with [facility] to replace nephrectomy tube outpatient.  Patient need to get out of here by Monday or Tuesday because she has appointment at [facility] for nephrectomy tube replacement on Wednesday this week.     Hypotension/brachycardia/hyperlipidemia.    Blood pressure in ER was 76/52.  Heart rates in 80s.  Hypotension resolved. Crestor.     Altered mental status.  Back to baseline.     Chronic respiratory failure.  Tracheotomy in place.  Robaxin.  Scopolamine patch.  Chest x-ray-Left lower lobar consolidation which may represent an acute inflammatory/infectious process, Atelectatic changes in the right lower lung persist.  Currently on 6 L / 28%.     Urinary retention.  Mojica cath in place.     Anemia.  Hemoglobin stable.  No sign of acute bleed.     Chronic pain/contracted.  Norco every 4 hours.  Baclofen every 4 hours.     Reflux . Protonix.     Lovenox prophylaxis.          Nutrition.  G-tube in place.  Nutrition consult for tube feeding.  KUB-G-tube is in good position.      Nursing home patient .     Urine culture-ESBL.  Blood cultures-gram-positive bacilli, 1 out of 2 bottles- corynebacterium species     Medical Decision Making  Number and Complexity of problems: UTI/sepsis/hypotension/brachycardia/altered mental status/chronic respiratory failure/chronic Mojica, PEG tube.  Paraplegic.  Differential Diagnosis: None     Conditions and Status        Condition is unchanged.     [facility] Data  External documents reviewed: Previous note .  Cardiac tracing (EKG, telemetry) interpretation: Normal sinus rhythm.  Radiology interpretation: X-ray/KUB  Labs reviewed:  Laboratory  Any tests that were considered but not ordered: Laboratory in AM     Decision rules/scores evaluated (example [id number], Wells, etc): None     Discussed with: Nursing     Care Planning  Shared decision making: Nursing  Code status and discussions: DNR.     Disposition  Social Determinants of Health that impact treatment or disposition: From nursing home.  1 to 3 days.       Electronically signed by Amado Villarreal MD, 03/31/24, 11:10 CDT.Follow [place] to have

## 2024-03-31 NOTE — CASE MANAGEMENT/SOCIAL WORK
Continued Stay Note  [facility]     Patient Name: Namita Zabala  MRN: [id number]  Today's Date: 3/31/2024    Admit Date: 3/27/2024    Plan: [facility]   Discharge Plan       Row Name 03/31/24 1134       Plan    Plan [facility]    Plan Comments Received consult for IV abx at nursing home ([facility]). SS will need to speak to admissions tomorrow (Monday) to notify of IV abx need.             SHASHA Brandt

## 2024-03-31 NOTE — PLAN OF CARE
Goal Outcome Evaluation:     Patient appears to be resting without over s/s of pain at this time- getting scheduled pain medication. Appears asleep at this time.

## 2024-03-31 NOTE — PLAN OF CARE
Problem: Skin Injury Risk Increased  Goal: Skin Health and Integrity  Outcome: Ongoing, Progressing  Intervention: Optimize Skin Protection  Recent Flowsheet Documentation  Taken 3/31/2024 0400 by Swapnil Dyer RN  Head of Bed (HOB) Positioning: HOB at 30 degrees  Taken 3/31/2024 0200 by Swapnil Dyer RN  Head of Bed (HOB) Positioning: HOB at 30 degrees  Taken 3/31/2024 0000 by Swapnil Dyer RN  Head of Bed (HOB) Positioning: HOB at 30 degrees  Taken 3/30/2024 2200 by Swapnil Dyer RN  Head of Bed (HOB) Positioning: HOB at 30 degrees  Taken 3/30/2024 2050 by Swapnil Dyer RN  Head of Bed (HOB) Positioning: HOB at 30 degrees     Problem: Fall Injury Risk  Goal: Absence of Fall and Fall-Related Injury  Outcome: Ongoing, Progressing  Intervention: Promote Injury-Free Environment  Recent Flowsheet Documentation  Taken 3/31/2024 0400 by Swapnil Dyer RN  Safety Promotion/Fall Prevention: safety round/check completed  Taken 3/31/2024 0200 by Swapnil Dyer RN  Safety Promotion/Fall Prevention: safety round/check completed  Taken 3/31/2024 0000 by Swapnil Dyer RN  Safety Promotion/Fall Prevention: safety round/check completed  Taken 3/30/2024 2200 by Swapnil Dyer RN  Safety Promotion/Fall Prevention: safety round/check completed  Taken 3/30/2024 2050 by Swapnil Dyer RN  Safety Promotion/Fall Prevention: safety round/check completed     Problem: Adjustment to Illness (Sepsis/Septic Shock)  Goal: Optimal Coping  Outcome: Ongoing, Progressing     Problem: Bleeding (Sepsis/Septic Shock)  Goal: Absence of Bleeding  Outcome: Ongoing, Progressing     Problem: Glycemic Control Impaired (Sepsis/Septic Shock)  Goal: Blood Glucose Level Within Desired Range  Outcome: Ongoing, Progressing     Problem: Infection Progression (Sepsis/Septic Shock)  Goal: Absence of Infection Signs and Symptoms  Outcome: Ongoing, Progressing  Intervention: Promote Recovery  Recent Flowsheet Documentation  Taken 3/31/2024 0400 by Swapnil Dyer RN  Activity  Management: bedrest  Taken 3/31/2024 0200 by Swapnil Dyer, RN  Activity Management: bedrest  Taken 3/31/2024 0000 by Swapnil Dyer, RN  Activity Management: bedrest  Taken 3/30/2024 2200 by Swapnil Dyer, RN  Activity Management: bedrest     Problem: Nutrition Impaired (Sepsis/Septic Shock)  Goal: Optimal Nutrition Intake  Outcome: Ongoing, Progressing   Goal Outcome Evaluation:

## 2024-04-01 LAB
ANION GAP SERPL CALCULATED.3IONS-SCNC: 12 MMOL/L (ref 5–15)
BUN SERPL-MCNC: 14 MG/DL (ref 6–20)
BUN/CREAT SERPL: 40 (ref 7–25)
CALCIUM SPEC-SCNC: 10 MG/DL (ref 8.6–10.5)
CHLORIDE SERPL-SCNC: 101 MMOL/L (ref 98–107)
CO2 SERPL-SCNC: 23 MMOL/L (ref 22–29)
CREAT SERPL-MCNC: 0.35 MG/DL (ref 0.57–1)
DEPRECATED RDW RBC AUTO: 42.3 FL (ref 37–54)
EGFRCR SERPLBLD CKD-EPI 2021: 127.8 ML/MIN/1.73
ERYTHROCYTE [DISTWIDTH] IN BLOOD BY AUTOMATED COUNT: 13 % (ref 12.3–15.4)
GLUCOSE BLDC GLUCOMTR-MCNC: 110 MG/DL (ref 70–130)
GLUCOSE BLDC GLUCOMTR-MCNC: 112 MG/DL (ref 70–130)
GLUCOSE BLDC GLUCOMTR-MCNC: 118 MG/DL (ref 70–130)
GLUCOSE SERPL-MCNC: 123 MG/DL (ref 65–99)
HCT VFR BLD AUTO: 34.2 % (ref 34–46.6)
HGB BLD-MCNC: 11.5 G/DL (ref 12–15.9)
MCH RBC QN AUTO: 30.4 PG (ref 26.6–33)
MCHC RBC AUTO-ENTMCNC: 33.6 G/DL (ref 31.5–35.7)
MCV RBC AUTO: 90.5 FL (ref 79–97)
PLATELET # BLD AUTO: 228 10*3/MM3 (ref 140–450)
PMV BLD AUTO: 10.7 FL (ref 6–12)
POTASSIUM SERPL-SCNC: 4.4 MMOL/L (ref 3.5–5.2)
QT INTERVAL: 302 MS
QTC INTERVAL: 426 MS
RBC # BLD AUTO: 3.78 10*6/MM3 (ref 3.77–5.28)
SODIUM SERPL-SCNC: 136 MMOL/L (ref 136–145)
WBC NRBC COR # BLD AUTO: 8.51 10*3/MM3 (ref 3.4–10.8)

## 2024-04-01 PROCEDURE — 80048 BASIC METABOLIC PNL TOTAL CA: CPT | Performed by: FAMILY MEDICINE

## 2024-04-01 PROCEDURE — 82948 REAGENT STRIP/BLOOD GLUCOSE: CPT

## 2024-04-01 PROCEDURE — 25810000003 LACTATED RINGERS SOLUTION: Performed by: STUDENT IN AN ORGANIZED HEALTH CARE EDUCATION/TRAINING PROGRAM

## 2024-04-01 PROCEDURE — 25810000003 LACTATED RINGERS PER 1000 ML: Performed by: STUDENT IN AN ORGANIZED HEALTH CARE EDUCATION/TRAINING PROGRAM

## 2024-04-01 PROCEDURE — 25010000002 ERTAPENEM PER 500 MG: Performed by: FAMILY MEDICINE

## 2024-04-01 PROCEDURE — 25010000002 ENOXAPARIN PER 10 MG: Performed by: FAMILY MEDICINE

## 2024-04-01 PROCEDURE — 85027 COMPLETE CBC AUTOMATED: CPT | Performed by: FAMILY MEDICINE

## 2024-04-01 RX ADMIN — BACLOFEN 5 MG: 10 TABLET ORAL at 04:04

## 2024-04-01 RX ADMIN — GUAIFENESIN 200 MG: 200 SOLUTION ORAL at 20:02

## 2024-04-01 RX ADMIN — Medication 20 MG: at 15:47

## 2024-04-01 RX ADMIN — Medication 45 ML: at 08:34

## 2024-04-01 RX ADMIN — Medication 20 MG: at 20:45

## 2024-04-01 RX ADMIN — Medication 20 MG: at 08:35

## 2024-04-01 RX ADMIN — Medication 20 MG: at 04:04

## 2024-04-01 RX ADMIN — SODIUM CHLORIDE, POTASSIUM CHLORIDE, SODIUM LACTATE AND CALCIUM CHLORIDE 75 ML/HR: 600; 310; 30; 20 INJECTION, SOLUTION INTRAVENOUS at 04:01

## 2024-04-01 RX ADMIN — BACLOFEN 5 MG: 10 TABLET ORAL at 16:31

## 2024-04-01 RX ADMIN — Medication 10 ML: at 20:04

## 2024-04-01 RX ADMIN — ENOXAPARIN SODIUM 40 MG: 100 INJECTION SUBCUTANEOUS at 08:31

## 2024-04-01 RX ADMIN — HYDROCODONE BITARTRATE AND ACETAMINOPHEN 1 TABLET: 5; 325 TABLET ORAL at 04:04

## 2024-04-01 RX ADMIN — SODIUM CHLORIDE, POTASSIUM CHLORIDE, SODIUM LACTATE AND CALCIUM CHLORIDE 75 ML/HR: 600; 310; 30; 20 INJECTION, SOLUTION INTRAVENOUS at 12:43

## 2024-04-01 RX ADMIN — BACLOFEN 5 MG: 10 TABLET ORAL at 12:40

## 2024-04-01 RX ADMIN — HYDROCODONE BITARTRATE AND ACETAMINOPHEN 1 TABLET: 5; 325 TABLET ORAL at 08:31

## 2024-04-01 RX ADMIN — BACLOFEN 5 MG: 10 TABLET ORAL at 00:28

## 2024-04-01 RX ADMIN — HYDROCODONE BITARTRATE AND ACETAMINOPHEN 1 TABLET: 5; 325 TABLET ORAL at 00:28

## 2024-04-01 RX ADMIN — GUAIFENESIN 200 MG: 200 SOLUTION ORAL at 08:31

## 2024-04-01 RX ADMIN — HYDROCODONE BITARTRATE AND ACETAMINOPHEN 1 TABLET: 5; 325 TABLET ORAL at 16:32

## 2024-04-01 RX ADMIN — SODIUM CHLORIDE, POTASSIUM CHLORIDE, SODIUM LACTATE AND CALCIUM CHLORIDE 500 ML: 600; 310; 30; 20 INJECTION, SOLUTION INTRAVENOUS at 02:08

## 2024-04-01 RX ADMIN — FLUTICASONE PROPIONATE 2 SPRAY: 50 SPRAY, METERED NASAL at 08:33

## 2024-04-01 RX ADMIN — HYDROCODONE BITARTRATE AND ACETAMINOPHEN 1 TABLET: 5; 325 TABLET ORAL at 20:02

## 2024-04-01 RX ADMIN — HYDROCODONE BITARTRATE AND ACETAMINOPHEN 1 TABLET: 5; 325 TABLET ORAL at 12:40

## 2024-04-01 RX ADMIN — BACLOFEN 5 MG: 10 TABLET ORAL at 20:02

## 2024-04-01 RX ADMIN — LANSOPRAZOLE 15 MG: 15 TABLET, ORALLY DISINTEGRATING ORAL at 05:21

## 2024-04-01 RX ADMIN — HYDROCODONE BITARTRATE AND ACETAMINOPHEN 1 TABLET: 5; 325 TABLET ORAL at 23:57

## 2024-04-01 RX ADMIN — ROSUVASTATIN CALCIUM 5 MG: 5 TABLET, FILM COATED ORAL at 08:31

## 2024-04-01 RX ADMIN — ERTAPENEM 1000 MG: 1 INJECTION INTRAMUSCULAR; INTRAVENOUS at 15:46

## 2024-04-01 RX ADMIN — Medication 10 ML: at 08:33

## 2024-04-01 RX ADMIN — BACLOFEN 5 MG: 10 TABLET ORAL at 23:57

## 2024-04-01 RX ADMIN — BACLOFEN 5 MG: 10 TABLET ORAL at 08:33

## 2024-04-01 NOTE — PLAN OF CARE
Goal Outcome Evaluation:   Pt non-verbal. Tele Dc'd today. Trach in place (6 shiley) with 8L/28% O2 via trach collar. Trach care completed and new dressing place. Serosangineous drainage noted from mouth and trach today. Pt appears to be clenching teeth, possibly bit tongue or inside of lip. Unable to assess thoroughly due to pt clenching and clamping mouth shut. MD aware. L nephrostomy noted. Dressing changed today. Peg tube to abd with Peptamine 1.5 @35 with a flush of 40ml. Pt turned and repositioned q 2hr. Bg monitored. Scheduled pain med admin.

## 2024-04-01 NOTE — CASE MANAGEMENT/SOCIAL WORK
Continued Stay Note  [facility]     Patient Name: Namita Zabala  MRN: [id number]  Today's Date: 4/1/2024    Admit Date: 3/27/2024    Plan: [facility]   Discharge Plan       Row Name 04/01/24 1121       Plan    Plan Comments Per admissions at [facility], they can accept pt back today on IV abx. Message sent to Physician to inform.    Final Discharge Disposition Code 03 - skilled nursing facility (SNF)    Final Note Call report number for [facility] is [phone]. Fax number is [phone]. Pharmacy updated in [facility] for [facility].                   Discharge Codes    No documentation.                       SHASHA Turner

## 2024-04-01 NOTE — PROGRESS NOTES
RT EQUIPMENT DEVICE RELATED - SKIN ASSESSMENT    Pritesh Score:  Pritesh Score: 12     RT Medical Equipment/Device:     Tracheostomy - Are sutures present:  No    Skin Assessment:      Neck:  Intact    Device Skin Pressure Protection:  Skin-to skin areas padded    Nurse Notification:  No    Venessa Henriquez, RRT

## 2024-04-01 NOTE — PROGRESS NOTES
Inpatient Nutrition Services  Tube Feeding follow-up  Patient Name:  Namita Zabala  YOB: 1975  MRN: [id number]  Admit Date:  3/27/2024  Assessment Date:  4/1/2024    Ordering daily weights and one-time PAB (to start tomorrow) to address risk for malnutrition.    Increasing goal rate of tube feeding to 50 mL/hr and discontinuing ProSource TF modular to meet EEN for optimal nutrition support.     Reason for Assessment       Row Name 04/01/24 1027          Reason for Assessment    Reason For Assessment follow-up protocol;TF/PN     Diagnosis infection/sepsis;cardiac disease;neurologic conditions;pulmonary disease;nutrition related history     Identified At Risk by Screening Criteria tube feeding or parenteral nutrition          Nutrition/Diet History       Row Name 04/01/24 1027          Nutrition/Diet History    Typical Intake (Food/Fluid/EN/PN) Pt with TF at goal rate. UOP clear and yellow. Trach with breathing treatment during visit. One stool today. Weight today 8-9 lb higher than on admission. Will order daily weights. Will modify tube feeding order and obtain PAB tomorrow morning.     Food Intolerance(s) Coconut, nuts, turkey     Enteral Nutrition Regimen Peptamen 1.5. Final goal rate 35 mL/hr. Free water 40 mL/hr.     Factors Affecting Nutritional Intake enteral/parenteral device(s);respiratory difficulty/therapies;restricted diet;cognitive status/motor function         Evaluation of Received Nutrient/Fluid Intake       Row Name 04/01/24 1031          Nutrient/Fluid Evaluation    Number of Days Evaluated 3 days        Calories Evaluation    Total Calorie Target (kcal) 1507     Oral Calories (kcal) 0     Enteral Calories (kcal) 855     Parenteral Calories (kcal) 0     Other Calories (kcal) 0     Total Calories (kcal) 855     % of Kcal Needs 56.74        Protein Evaluation    Total Protein Target (g) 71     Oral Protein (g) 0     Enteral Protein (g) 39     Parenteral Protein (g) 0     Other  Protein (g) 0     Total Protein (g) 39     % of Protein Needs 54.93        Intake Assessment    Energy/Calorie Requirement Assessment not meeting needs     Protein Requirement Assessment not meeting needs     Fluid Requirement Assessment not meeting needs     Average Calorie Intake (days) 3     Average Protein Intake (days) 3     Tolerance tolerating        Fluid Intake Evaluation    Total Fluid Target (mL) 1507     Oral Fluid (mL) 0     Enteral Fluid (mL) 1137     Parenteral Fluid (mL) 0     Other Fluid (mL) --  954 mL IVF total x 72 hr        PO Evaluation    % PO Intake NPO        EN Evaluation    Number of Days EN Intake Evaluated 3 days     EN Average Volume Delivered (mL/day) 570 mL/day     % Goal Volume  74 %     TF Residual 0     HOB Greater than or equal to 30 degress               Labs/Tests/Procedures/Meds       Row Name 04/01/24 1029          Labs/Procedures/Meds    Lab Results Reviewed reviewed     Lab Results Comments Glu, Cr, H/H        Diagnostic Tests/Procedures    Diagnostic Test/Procedure Reviewed reviewed        Medications    Pertinent Medications Reviewed reviewed     Pertinent Medications Comments See MAR                    Physical Findings       Row Name 04/01/24 1029          Physical Findings    Overall Physical Appearance Trach, PEG, BM 4/1, Pritesh Score 12                    Estimated/Assessed Needs - Anthropometrics       Row Name 04/01/24 1030 04/01/24 0500       Anthropometrics    Weight -- 54.8 kg (120 lb 12.8 oz)    Weight for Calculation 54.8 kg (120 lb 12.8 oz) --       Estimated/Assessed Needs    Additional Documentation Fluid Requirements (Group);KCAL/KG (Group);Protein Requirements (Group) --       KCAL/KG    KCAL/KG 25 Kcal/Kg (kcal);30 Kcal/Kg (kcal) --    25 Kcal/Kg (kcal) 1369.875 --    30 Kcal/Kg (kcal) 1643.85 --       Protein Requirements    Weight Used For Protein Calculations 54.8 kg (120 lb 13 oz) --    Est Protein Requirement Amount (gms/kg) 1.3 gm protein --     Estimated Protein Requirements (gms/day) 71.24 --       Fluid Requirements    Fluid Requirements (mL/day) 1507 --    Estimated Fluid Requirement Method other (see comments)  average of kcal range; 1mL/kcal --    RDA Method (mL) 1507 --                   Nutrition Prescription Ordered       Row Name 04/01/24 1031          Nutrition Prescription PO    Current PO Diet NPO        Nutrition Prescription EN    Enteral Route PEG     Product Peptamen 1.5 (Vital 1.5)     TF Delivery Method Continuous     Continuous TF Goal Rate (mL/hr) 35 mL/hr     Continuous TF Current Rate (mL/hr) 35 mL/hr     Continuous TF Goal Volume (mL) 770 mL     Continuous TF Current Volume (mL) 770 mL     Water flush (mL)  40 mL     Water Flush Frequency Per hour                  Problem/Interventions:   Problem 1       Row Name 04/01/24 1033          Nutrition Diagnoses Problem 1    Problem 1 Needs Alternate Route     Etiology (related to) Medical Diagnosis;Factors Affecting Nutrition     Cardiac Hypotension  improving     Infectious Disease UTI;Sepsis     Musculoskeletal Other (comment)  contractures noted     Neurological Other (comment)  hx of anoxic brain  injury with functional quadriplegia     Pulmonary/Critical Care Other (comment)  trach     Oral Chewing Difficulty;Swallowing Difficulty     Signs/Symptoms (evidenced by) NPO;EN Intake Delivery  enteral ntn per MD consult     Percent (%) of EN goal 74 %                          Intervention Goal       Row Name 04/01/24 1035          Intervention Goal    General Nutrition support treatment;Disease management/therapy;Meet nutritional needs for age/condition     TF/PN Tolerate TF at goal;Adjust TF/PN;Deliver (%) goal;Deliver estimated need (%)     Deliver % of Goal 75 %  greater than     Deliver % of Estimated Need 75 %  greater than     Weight Maintain weight  will monitor weight trend                    Nutrition Intervention       Row Name 04/01/24 1035          Nutrition Intervention     RD/Tech Action Follow Tx progress;Care plan reviewd;Recommend/ordered     Recommended/Ordered EN                    Nutrition Prescription       Row Name 04/01/24 1035          Nutrition Prescription EN    Enteral Prescription Enteral begin/change;Enteral to supply     Enteral Route PEG     Product Peptamin 1.5 darryl     Modulars Other (comment)  Will discontinue     TF Delivery Method Continuous     Continuous TF Goal Rate (mL/hr) 50 mL/hr     Continuous TF Goal Volume (mL) 1100 mL     Water flush (mL)  40 mL     Water Flush Frequency Per hour     New EN Prescription Ordered? Yes        EN to Supply    Kcal/Day 1650 Kcal/Day     Kcal/Kg 30 Kcal/Kg     Kcal/Kg Weight Method Actual weight     Protein (gm/day) 75 gm/day     Meet Estimated Kcal Need (%) 100 %  Using 1644kcal/day for denominator     Meet Estimated Protein Need (%) 106 %     TF Free H2O (mL) 845 mL     Total Free H2O (mL/day) 1725 mL/day        Other Orders    Obtain Weight Daily     Obtain Weight Ordered? Yes     Labs PreAlb     Labs Ordered? Yes                    Education/Evaluation       Row Name 04/01/24 1040          Education    Education No discharge needs identified at this time        Monitor/Evaluation    Monitor Per protocol                     Electronically signed by:  Morelia Warner RDN, LINDA  04/01/24 10:41 CDT

## 2024-04-01 NOTE — CASE MANAGEMENT/SOCIAL WORK
Continued Stay Note  [facility]     Patient Name: Namita Zabala  MRN: [id number]  Today's Date: 4/1/2024    Admit Date: 3/27/2024    Plan: [facility]   Discharge Plan       Row Name 04/01/24 1633       Plan    Plan Comments Due to pharmacy closing, pt cannot dc back to [facility] today. Bed will be ready in AM if Physician plans to discharge.                   Discharge Codes    No documentation.                       SHASHA Turner

## 2024-04-01 NOTE — PLAN OF CARE
Patient received a 1L LR bolus this shift. Patient was tach 100-150's. Patient is profusely sweating but has remained afebrile.  ml's this shift and BM X2 this shift. Patient safety maintained.     Problem: Skin Injury Risk Increased  Goal: Skin Health and Integrity  Outcome: Ongoing, Progressing  Intervention: Optimize Skin Protection  Recent Flowsheet Documentation  Taken 4/1/2024 0200 by Alexis Meeks RN  Head of Bed (HOB) Positioning: HOB at 30-45 degrees  Taken 4/1/2024 0000 by Alexis Meeks RN  Head of Bed (HOB) Positioning: HOB at 30-45 degrees  Taken 3/31/2024 2200 by Alexis Meeks RN  Head of Bed (HOB) Positioning: HOB at 30-45 degrees  Taken 3/31/2024 2000 by Alexis Meeks RN  Pressure Reduction Techniques:   weight shift assistance provided   heels elevated off bed  Head of Bed (HOB) Positioning: HOB at 30-45 degrees  Pressure Reduction Devices: pressure-redistributing mattress utilized  Skin Protection:   adhesive use limited   tubing/devices free from skin contact   skin-to-skin areas padded   skin-to-device areas padded   incontinence pads utilized     Problem: Fall Injury Risk  Goal: Absence of Fall and Fall-Related Injury  Outcome: Ongoing, Progressing  Intervention: Promote Injury-Free Environment  Recent Flowsheet Documentation  Taken 4/1/2024 0500 by Alexis Meeks RN  Safety Promotion/Fall Prevention: safety round/check completed  Taken 4/1/2024 0400 by Alexis Meeks RN  Safety Promotion/Fall Prevention: safety round/check completed  Taken 4/1/2024 0300 by Alexis Meeks RN  Safety Promotion/Fall Prevention: safety round/check completed  Taken 4/1/2024 0200 by Alexis Meeks RN  Safety Promotion/Fall Prevention: safety round/check completed  Taken 4/1/2024 0100 by Alexis Meeks RN  Safety Promotion/Fall Prevention: safety round/check completed  Taken 4/1/2024 0000 by Alexis Meeks RN  Safety Promotion/Fall Prevention: safety round/check  completed  Taken 3/31/2024 2300 by Alexis Meeks RN  Safety Promotion/Fall Prevention: safety round/check completed  Taken 3/31/2024 2200 by Alexis Meeks RN  Safety Promotion/Fall Prevention: safety round/check completed  Taken 3/31/2024 2100 by Alexis Meeks RN  Safety Promotion/Fall Prevention: safety round/check completed  Taken 3/31/2024 2000 by Alexis Meeks RN  Safety Promotion/Fall Prevention: safety round/check completed  Taken 3/31/2024 1900 by Alexis Meeks RN  Safety Promotion/Fall Prevention: safety round/check completed     Problem: Adjustment to Illness (Sepsis/Septic Shock)  Goal: Optimal Coping  Outcome: Ongoing, Progressing  Intervention: Optimize Psychosocial Adjustment to Illness  Recent Flowsheet Documentation  Taken 3/31/2024 2000 by Alexis Meeks RN  Family/Support System Care: support provided     Problem: Bleeding (Sepsis/Septic Shock)  Goal: Absence of Bleeding  Outcome: Ongoing, Progressing     Problem: Glycemic Control Impaired (Sepsis/Septic Shock)  Goal: Blood Glucose Level Within Desired Range  Outcome: Ongoing, Progressing     Problem: Infection Progression (Sepsis/Septic Shock)  Goal: Absence of Infection Signs and Symptoms  Outcome: Ongoing, Progressing  Intervention: Promote Recovery  Recent Flowsheet Documentation  Taken 4/1/2024 0200 by Alexis Meeks RN  Activity Management: bedrest  Taken 4/1/2024 0000 by Alexis Meeks RN  Activity Management: bedrest  Taken 3/31/2024 2200 by Alexis Meeks RN  Activity Management: bedrest  Taken 3/31/2024 2000 by Alexis Meeks RN  Activity Management:   activity minimized   bedrest     Problem: Nutrition Impaired (Sepsis/Septic Shock)  Goal: Optimal Nutrition Intake  Outcome: Ongoing, Progressing   Goal Outcome Evaluation:

## 2024-04-01 NOTE — PLAN OF CARE
Goal Outcome Evaluation: Patient non-verbal, at times able to track movement in room but unable to follow any commands. Rested most of the day- this early afternoon developing tachy heart rate at time and perspiration- afebrile- tolerating TF at this time. Turn Q2- IVF continued and antibiotics given as ordered.  visited today and states to nurse patient is to go to [place] on Wednesday for nephrostomy check. Turn Q2- one loose BM today.

## 2024-04-01 NOTE — PROGRESS NOTES
[facility] Medicine Services  INPATIENT PROGRESS NOTE    Patient Name: Namita Zabala  Date of Admission: 3/27/2024  Today's Date: 04/01/24  Length of Stay: 4  Primary Care Physician: David Lara MD    Subjective   Chief Complaint: ESBL  HPI   Appears at baseline, some sweating in forehead. Scant amount of bright blood in tracheostomy.     Review of Systems   All pertinent negatives and positives are as above. All other systems have been reviewed and are negative unless otherwise stated.     Objective    Temp:  [97.4 °F (36.3 °C)-99.4 °F (37.4 °C)] 99.4 °F (37.4 °C)  Heart Rate:  [102-133] 110  Resp:  [18-22] 19  BP: (129-152)/(75-97) 129/78  Physical Exam  Constitutional:       Comments: Contracted . chronically ill.     HENT:      Head: Normocephalic.      Comments: Neck lean to the right side, chronic condition.  Eyes:      Conjunctiva/sclera: Conjunctivae normal.      Pupils: Pupils are equal, round, and reactive to light.   Cardiovascular:      Rate and Rhythm: Regular rhythm.  Heart rates in the 90.     Heart sounds: Normal heart sounds.   Pulmonary:      Effort: No respiratory distress.      Comments: Trach in place.  Diminished breath sound bilateral, clear .  Abdominal:      General: Bowel sounds are normal. There is no distension.      Palpations: Abdomen is soft.      Tenderness: There is no abdominal tenderness.  Gastric tube in place.  Nephrectomy tube in place left side.  Musculoskeletal:         General: No swelling.      Cervical back: Neck supple.      Comments: Contracted all extremities.  . Paraplegic.  Atrophy of the muscle.   Skin:     General: Skin is warm and dry.      Capillary Refill: Capillary refill takes 2 to 3 seconds.      Findings: No rash.   Neurological:      Mental Status: She is alert.       Results Review:  I have reviewed the labs, radiology results, and diagnostic studies.    Laboratory Data:   Results from last 7 days   Lab  Units 04/01/24  0703 03/31/24  0520 03/30/24  0350   WBC 10*3/mm3 8.51 6.34 6.71   HEMOGLOBIN g/dL 11.5* 10.9* 10.5*   HEMATOCRIT % 34.2 32.0* 31.3*   PLATELETS 10*3/mm3 228 187 216        Results from last 7 days   Lab Units 04/01/24  0703 03/31/24  0418 03/30/24  0350 03/29/24  0548 03/28/24  0431 03/27/24  2327   SODIUM mmol/L 136 139 137   < > 139 140   POTASSIUM mmol/L 4.4 3.9 3.7   < > 4.2 4.0   CHLORIDE mmol/L 101 107 102   < > 106 101   CO2 mmol/L 23.0 22.0 23.0   < > 20.0* 27.0   BUN mg/dL 14 11 15   < > 28* 34*   CREATININE mg/dL 0.35* 0.35* 0.45*   < > 0.35* 0.46*   CALCIUM mg/dL 10.0 9.7 9.5   < > 8.6 9.6   BILIRUBIN mg/dL  --   --   --   --  0.4 0.4   ALK PHOS U/L  --   --   --   --  62 68   ALT (SGPT) U/L  --   --   --   --  15 15   AST (SGOT) U/L  --   --   --   --  21 17   GLUCOSE mg/dL 123* 110* 115*   < > 89 164*    < > = values in this interval not displayed.       Culture Data:   Blood Culture   Date Value Ref Range Status   03/27/2024 No growth at 4 days  Preliminary   03/27/2024 Corynebacterium species (C)  Final     Urine Culture   Date Value Ref Range Status   03/28/2024 >100,000 CFU/mL Escherichia coli ESBL (A)  Final     Comment:       Consider infectious disease consult.  Susceptibility results may not correlate to clinical outcomes.   03/28/2024 <10,000 CFU/mL Proteus mirabilis (A)  Final   03/28/2024 <10,000 CFU/mL Klebsiella pneumoniae ESBL (A)  Final     Comment:       Consider infectious disease consult.  Susceptibility results may not correlate to clinical outcomes.       Radiology Data:   Imaging Results (Last 24 Hours)       ** No results found for the last 24 hours. **            I have reviewed the patient's current medications.     Assessment/Plan   Assessment  Active Hospital Problems    Diagnosis     **UTI (urinary tract infection)     History of anoxic brain injury     Functional quadriplegia     Severe malnutrition     Acute respiratory failure with hypoxia     Sepsis  secondary to UTI        Treatment Plan  UTI/sepsis.  Sepsis resolved.  Status post 2 L bolus in ER.  ESBL from urine culture.  Change Zosyn to Invanz today 3/30/2024.  Lactated ringer.  She will probably need a midline and IV antibiotics arranged by  before discharge back to nursing home.     Nephrectomy tube left side in place.  Follow-up with [place] to replace nephrectomy tube outpatient.  Patient need to get out of here by Monday or Tuesday because she has appointment at [place] for nephrectomy tube replacement on Wednesday this week.     Hypotension/brachycardia/hyperlipidemia.    Blood pressure in ER was 76/52.  Heart rates in 80s.  Hypotension resolved. Crestor.     Altered mental status.  Back to baseline.     Chronic respiratory failure.  Tracheotomy in place.  Robaxin.  Scopolamine patch.  Chest x-ray-Left lower lobar consolidation which may represent an acute inflammatory/infectious process, Atelectatic changes in the right lower lung persist.  Currently on 6 L / 28%.     Urinary retention.  Mojica cath in place.     Anemia.  Hemoglobin stable.  No sign of acute bleed.     Chronic pain/contracted.  Norco every 4 hours.  Baclofen every 4 hours.     Reflux . Protonix.     Lovenox prophylaxis.           Nutrition.  G-tube in place.  Nutrition consult for tube feeding.  KUB-G-tube is in good position.      Nursing home patient .     Urine culture-ESBL.  Blood cultures-gram-positive bacilli, 1 out of 2 bottles- corynebacterium species     Medical Decision Making  Number and Complexity of problems: UTI/sepsis/hypotension/brachycardia/altered mental status/chronic respiratory failure/chronic Mojica, PEG tube.  Paraplegic.  Differential Diagnosis: None     Conditions and Status        Condition is unchanged.     [facility] Data  External documents reviewed: Previous note .  Cardiac tracing (EKG, telemetry) interpretation: Normal sinus rhythm.  Radiology interpretation: X-ray/KUB  Labs reviewed:  Laboratory  Any tests that were considered but not ordered: Laboratory in AM     Decision rules/scores evaluated (example [id number], [place], etc): None     Discussed with: Nursing     Care Planning  Shared decision making: Nursing  Code status and discussions: DNR.     Disposition  Social Determinants of Health that impact treatment or disposition: From nursing home.  1 to 3 days.    Electronically signed by David Hernández MD, 04/01/24, 10:25 CDT.

## 2024-04-02 VITALS
RESPIRATION RATE: 18 BRPM | WEIGHT: 120.8 LBS | HEART RATE: 93 BPM | DIASTOLIC BLOOD PRESSURE: 89 MMHG | TEMPERATURE: 98.4 F | HEIGHT: 60 IN | SYSTOLIC BLOOD PRESSURE: 132 MMHG | OXYGEN SATURATION: 99 % | BODY MASS INDEX: 23.71 KG/M2

## 2024-04-02 LAB
ANION GAP SERPL CALCULATED.3IONS-SCNC: 11 MMOL/L (ref 5–15)
BACTERIA SPEC AEROBE CULT: NORMAL
BUN SERPL-MCNC: 10 MG/DL (ref 6–20)
BUN/CREAT SERPL: 33.3 (ref 7–25)
CALCIUM SPEC-SCNC: 9.8 MG/DL (ref 8.6–10.5)
CHLORIDE SERPL-SCNC: 103 MMOL/L (ref 98–107)
CO2 SERPL-SCNC: 25 MMOL/L (ref 22–29)
CREAT SERPL-MCNC: 0.3 MG/DL (ref 0.57–1)
DEPRECATED RDW RBC AUTO: 43.8 FL (ref 37–54)
EGFRCR SERPLBLD CKD-EPI 2021: 132.7 ML/MIN/1.73
ERYTHROCYTE [DISTWIDTH] IN BLOOD BY AUTOMATED COUNT: 13.4 % (ref 12.3–15.4)
GLUCOSE BLDC GLUCOMTR-MCNC: 114 MG/DL (ref 70–130)
GLUCOSE BLDC GLUCOMTR-MCNC: 122 MG/DL (ref 70–130)
GLUCOSE BLDC GLUCOMTR-MCNC: 133 MG/DL (ref 70–130)
GLUCOSE SERPL-MCNC: 104 MG/DL (ref 65–99)
HCT VFR BLD AUTO: 32.2 % (ref 34–46.6)
HGB BLD-MCNC: 10.8 G/DL (ref 12–15.9)
MCH RBC QN AUTO: 30.6 PG (ref 26.6–33)
MCHC RBC AUTO-ENTMCNC: 33.5 G/DL (ref 31.5–35.7)
MCV RBC AUTO: 91.2 FL (ref 79–97)
PLATELET # BLD AUTO: 234 10*3/MM3 (ref 140–450)
PMV BLD AUTO: 10 FL (ref 6–12)
POTASSIUM SERPL-SCNC: 3.6 MMOL/L (ref 3.5–5.2)
POTASSIUM SERPL-SCNC: 5 MMOL/L (ref 3.5–5.2)
PREALB SERPL-MCNC: 17.6 MG/DL (ref 20–40)
RBC # BLD AUTO: 3.53 10*6/MM3 (ref 3.77–5.28)
SODIUM SERPL-SCNC: 139 MMOL/L (ref 136–145)
WBC NRBC COR # BLD AUTO: 7.25 10*3/MM3 (ref 3.4–10.8)

## 2024-04-02 PROCEDURE — 82948 REAGENT STRIP/BLOOD GLUCOSE: CPT

## 2024-04-02 PROCEDURE — 84132 ASSAY OF SERUM POTASSIUM: CPT | Performed by: FAMILY MEDICINE

## 2024-04-02 PROCEDURE — 25010000002 ENOXAPARIN PER 10 MG: Performed by: FAMILY MEDICINE

## 2024-04-02 PROCEDURE — 85027 COMPLETE CBC AUTOMATED: CPT | Performed by: FAMILY MEDICINE

## 2024-04-02 PROCEDURE — 80048 BASIC METABOLIC PNL TOTAL CA: CPT | Performed by: FAMILY MEDICINE

## 2024-04-02 PROCEDURE — 84134 ASSAY OF PREALBUMIN: CPT | Performed by: FAMILY MEDICINE

## 2024-04-02 PROCEDURE — 25010000002 ERTAPENEM PER 500 MG: Performed by: FAMILY MEDICINE

## 2024-04-02 RX ORDER — POTASSIUM CHLORIDE 1.5 G/1.58G
40 POWDER, FOR SOLUTION ORAL EVERY 4 HOURS
Status: COMPLETED | OUTPATIENT
Start: 2024-04-02 | End: 2024-04-02

## 2024-04-02 RX ADMIN — BACLOFEN 5 MG: 10 TABLET ORAL at 03:39

## 2024-04-02 RX ADMIN — HYDROCODONE BITARTRATE AND ACETAMINOPHEN 1 TABLET: 5; 325 TABLET ORAL at 03:39

## 2024-04-02 RX ADMIN — Medication 10 ML: at 08:44

## 2024-04-02 RX ADMIN — HYDROCODONE BITARTRATE AND ACETAMINOPHEN 1 TABLET: 5; 325 TABLET ORAL at 15:14

## 2024-04-02 RX ADMIN — Medication 20 MG: at 03:40

## 2024-04-02 RX ADMIN — POTASSIUM CHLORIDE 40 MEQ: 1.5 POWDER, FOR SOLUTION ORAL at 08:47

## 2024-04-02 RX ADMIN — Medication 20 MG: at 15:16

## 2024-04-02 RX ADMIN — HYDROCODONE BITARTRATE AND ACETAMINOPHEN 1 TABLET: 5; 325 TABLET ORAL at 11:55

## 2024-04-02 RX ADMIN — BACLOFEN 5 MG: 10 TABLET ORAL at 11:55

## 2024-04-02 RX ADMIN — Medication 20 MG: at 08:45

## 2024-04-02 RX ADMIN — ENOXAPARIN SODIUM 40 MG: 100 INJECTION SUBCUTANEOUS at 08:45

## 2024-04-02 RX ADMIN — BACLOFEN 5 MG: 10 TABLET ORAL at 15:14

## 2024-04-02 RX ADMIN — ROSUVASTATIN CALCIUM 5 MG: 5 TABLET, FILM COATED ORAL at 08:43

## 2024-04-02 RX ADMIN — FLUTICASONE PROPIONATE 2 SPRAY: 50 SPRAY, METERED NASAL at 08:44

## 2024-04-02 RX ADMIN — ERTAPENEM 1000 MG: 1 INJECTION INTRAMUSCULAR; INTRAVENOUS at 15:14

## 2024-04-02 RX ADMIN — POTASSIUM CHLORIDE 40 MEQ: 1.5 POWDER, FOR SOLUTION ORAL at 05:41

## 2024-04-02 RX ADMIN — GUAIFENESIN 200 MG: 200 SOLUTION ORAL at 08:43

## 2024-04-02 RX ADMIN — BACLOFEN 5 MG: 10 TABLET ORAL at 08:43

## 2024-04-02 RX ADMIN — LANSOPRAZOLE 15 MG: 15 TABLET, ORALLY DISINTEGRATING ORAL at 05:41

## 2024-04-02 RX ADMIN — HYDROCODONE BITARTRATE AND ACETAMINOPHEN 1 TABLET: 5; 325 TABLET ORAL at 08:44

## 2024-04-02 NOTE — PLAN OF CARE
Goal Outcome Evaluation:  Plan of Care Reviewed With: patient        Progress: no change  Outcome Evaluation: PT screen completed. Per chart review, patient is currently at baseline and does not meet the criteria for physical therapy. PT to sign off.

## 2024-04-02 NOTE — CASE MANAGEMENT/SOCIAL WORK
Continued Stay Note  [facility]     Patient Name: Namita Zabala  MRN: [id number]  Today's Date: 4/2/2024    Admit Date: 3/27/2024    Plan: [facility]   Discharge Plan       Row Name 04/02/24 1426       Plan    Final Note Pt is being dcd to [facility] today, skilled level. Call report number is [phone]. Fax number is [phone]                   Discharge Codes    No documentation.                 Expected Discharge Date and Time       Expected Discharge Date Expected Discharge Time    Apr 2, 2024               SHASHA Turner

## 2024-04-02 NOTE — DISCHARGE PLACEMENT REQUEST
"Namita Cooper N (48 y.o. Female)       Date of Birth   1975    Social Security Number       Address   [address] [facility] [address]    Home Phone   [phone]    MRN   [id number]       Sabianism   [facility]    Marital Status                               Admission Date   3/27/24    Admission Type   Emergency    Admitting Provider   David Hernández MD    Attending Provider   David Hernández MD    Department, Room/Bed   [facility] 3A, 324/1       Discharge Date       Discharge Disposition   Skilled Nursing Facility (DC - External)    Discharge Destination                                 Attending Provider: David Hernández MD    Allergies: Coconut, Levaquin [Levofloxacin], Nuts, Penicillins, [place]    Isolation: Contact   Infection: ESBL Klebsiella (04/07/22), ESBL E coli (10/13/23)   Code Status: No CPR    Ht: 152.4 cm (60\")   Wt: 54.8 kg (120 lb 12.8 oz)    Admission Cmt: None   Principal Problem: UTI (urinary tract infection) [N39.0]                   Active Insurance as of 3/27/2024       Primary Coverage       Payor Plan Insurance Group Employer/Plan Group    MEDICARE MEDICARE A & B        Payor Plan Address Payor Plan Phone Number Payor Plan Fax Number Effective Dates    [address] [phone]  7/1/2019 - None Entered    [facility] [id number]         Subscriber Name Subscriber Birth Date Member ID       NAMITA COOPER N 1975 [id number]               Secondary Coverage       Payor Plan Insurance Group Employer/Plan Group    [place]        Payor Plan Address Payor Plan Phone Number Payor Plan Fax Number Effective Dates    [address] [phone]  9/15/2023 - None Entered    [facility] [place] [address]         Subscriber Name Subscriber Birth Date Member ID       NAMITA COOPER N 1975 [id number]                     Emergency Contacts        (Rel.) Home Phone Work Phone Mobile Phone    " Grant Zabala (Spouse) -- -- [phone]    Noel Johnson (Father) [phone] -- [phone]    Marquita Johnson (Mother) -- -- [phone]    Neida Zabala (Relative) -- -- [phone]                   Discharge Summary        David Hernández MD at 04/02/24 6414                [facility]  DISCHARGE SUMMARY       Date of Admission: 3/27/2024  Date of Discharge:  4/2/2024  Primary Care Physician: David Lara MD    Presenting Problem/History of Present Illness:  Patient brought to ED from nursing home as  was called and told that the patient had a low pulse and low blood pressure and her head was tilted abnormally. Initial presentation in the emergency department showed hypotension with blood pressure at 76/52. Patient is chronically ill and not very responsive at baseline. There is a indwelling Mojica catheter in place. This was changed out and a UA showed significant UTI. Patient received 2 L IV fluids and IV antibiotics and we were asked to admit for further care. Blood pressure did improve significantly with IV fluids and the patient is back to her baseline. Will admit to ICU     Final Discharge Diagnoses:  Active Hospital Problems    Diagnosis     **UTI (urinary tract infection)     History of anoxic brain injury     Functional quadriplegia     Severe malnutrition     Acute respiratory failure with hypoxia     Sepsis secondary to UTI        Consults: -    Procedures Performed: -    Pertinent Test Results:   Results for orders placed during the hospital encounter of 08/27/21    Adult Transthoracic Echo Complete W/ Cont if Necessary Per Protocol    Interpretation Summary  · Estimated left ventricular EF = 60% Left ventricular systolic function is normal.  · Left ventricular diastolic function is consistent with (grade I) impaired relaxation.  · The right ventricular cavity is mildly dilated. Systolic function is normal.  · The right  atrial cavity is mildly dilated.  · There is mild aortic and tricuspid valve regurgitation present.  · Estimated right ventricular systolic pressure from tricuspid regurgitation is normal (<35 mmHg).      Imaging Results (All)       Procedure Component Value Units Date/Time    XR Abdomen KUB [[id number]] Collected: 03/28/24 0845     Updated: 03/28/24 0850    Narrative:      XR ABDOMEN KUB- 3/28/2024 6:38 AM     HISTORY: G tube placement verification; R79.89-Other specified abnormal  findings of blood chemistry; T83.511A-Infection and inflammatory  reaction due to indwelling urethral catheter, initial encounter;  N39.0-Urinary tract infection, site not specified; E87.20-Acidosis,  unspecified; I95.9-Hypotension, unspecified     COMPARISON: Abdominal radiograph dated 3/19/2024     FINDINGS:     G-tube is present. Contrast was injected through the G-tube opacifying  the stomach and duodenum, contrast extending just beyond the ligament of  Treitz. There is a mild gaseous distention of the transverse colon.  Prominent stool in the sigmoid colon. No intraperitoneal free air.  Incidentally noted percutaneous internal/external drain.       Impression:         1.  G-tube is in good position.           This report was signed and finalized on 3/28/2024 8:47 AM by Dr Rob Ribeiro.       XR Chest 1 View [[id number]] Collected: 03/28/24 0632     Updated: 03/28/24 0638    Narrative:      EXAMINATION: XR CHEST 1 VW-     3/27/2024 10:21 PM     HISTORY: fever     A frontal projection of the chest is compared with the previous study  dated 2/16/2024.     The lungs are poorly expanded, worse than the previous study.     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     Linear opacity in the right lower lung persist and probably represent  discoid atelectasis as suggested previously.     There is no pulmonary congestion or pneumothorax.     The  heart size is not optimally evaluated due to the AP projection.     A tracheostomy tube is in place.     No acute bony abnormality.       Impression:      1. Left lower lobar consolidation which may represent an acute  inflammatory/infectious process.  2. Atelectatic changes in the right lower lung persist.        This report was signed and finalized on 3/28/2024 6:34 AM by Dr. Lui Bush MD.             LAB RESULTS:      Lab 04/02/24  0345 04/01/24  0703 03/31/24  0520 03/30/24  0350 03/29/24  0548 03/28/24  0431 03/27/24  2327   WBC 7.25 8.51 6.34 6.71 6.33 6.92 9.61   HEMOGLOBIN 10.8* 11.5* 10.9* 10.5* 10.0* 11.3* 10.2*   HEMATOCRIT 32.2* 34.2 32.0* 31.3* 31.1* 37.4 31.5*   PLATELETS 234 228 187 216 179 202 229   NEUTROS ABS  --   --   --   --   --  4.16 7.15*   IMMATURE GRANS (ABS)  --   --   --   --   --  0.04 0.03   LYMPHS ABS  --   --   --   --   --  1.74 1.51   MONOS ABS  --   --   --   --   --  0.52 0.52   EOS ABS  --   --   --   --   --  0.42* 0.38   MCV 91.2 90.5 90.7 91.5 94.5 101.1* 95.2   PROCALCITONIN  --   --   --   --   --   --  0.85*   LACTATE  --   --   --   --   --  1.0 2.2*         Lab 04/02/24  0345 04/01/24  0703 03/31/24  0418 03/30/24  0350 03/29/24  0548 03/28/24  0431 03/27/24  2327   SODIUM 139 136 139 137 140 139 140   POTASSIUM 3.6 4.4 3.9 3.7 4.1 4.2 4.0   CHLORIDE 103 101 107 102 104 106 101   CO2 25.0 23.0 22.0 23.0 23.0 20.0* 27.0   ANION GAP 11.0 12.0 10.0 12.0 13.0 13.0 12.0   BUN 10 14 11 15 19 28* 34*   CREATININE 0.30* 0.35* 0.35* 0.45* 0.41* 0.35* 0.46*   EGFR 132.7 127.8 127.8 120.3 123.1 127.8 119.7   GLUCOSE 104* 123* 110* 115* 103* 89 164*   CALCIUM 9.8 10.0 9.7 9.5 9.3 8.6 9.6   MAGNESIUM  --   --   --   --   --  2.2 2.2   PHOSPHORUS  --   --   --   --   --  2.5  --          Lab 03/28/24  0431 03/27/24  2327   TOTAL PROTEIN 6.2 6.8   ALBUMIN 3.2* 3.9   GLOBULIN 3.0 2.9   ALT (SGPT) 15 15   AST (SGOT) 21 17   BILIRUBIN 0.4 0.4   ALK PHOS 62 68         Lab  03/28/24  0128 03/27/24  2327   HSTROP T 82* 86*                 Brief Urine Lab Results  (Last result in the past 365 days)        Color   Clarity   Blood   Leuk Est   Nitrite   Protein   CREAT   Urine HCG        03/28/24 0006 Dark Yellow   Turbid   Large (3+)   Large (3+)   Positive   100 mg/dL (2+)                 Microbiology Results (last 10 days)       Procedure Component Value - Date/Time    Urine Culture - Urine, Indwelling Urethral Catheter [[id number]]  (Abnormal)  (Susceptibility) Collected: 03/28/24 0006    Lab Status: Final result Specimen: Urine from Indwelling Urethral Catheter Updated: 03/31/24 1002     Urine Culture >100,000 CFU/mL Escherichia coli ESBL     Comment:   Consider infectious disease consult.  Susceptibility results may not correlate to clinical outcomes.         <10,000 CFU/mL Proteus mirabilis      <10,000 CFU/mL Klebsiella pneumoniae ESBL     Comment:   Consider infectious disease consult.  Susceptibility results may not correlate to clinical outcomes.       Narrative:      Colonization of the urinary tract without infection is common. Treatment is discouraged unless the patient is symptomatic, pregnant, or undergoing an invasive urologic procedure.  Recent outcomes data supports the use of pip/tazo in the treatment of susceptible ESBL infections for uncomplicated UTI. Consider use of pip/tazo as a carbapenem-sparing regimen in applicable patients.    Susceptibility        Escherichia coli ESBL      SANDEEP      Amikacin Susceptible      Ertapenem Susceptible      Gentamicin Resistant      Levofloxacin Resistant      Meropenem Susceptible      Nitrofurantoin Susceptible      Piperacillin + Tazobactam Susceptible      Tobramycin Resistant      Trimethoprim + Sulfamethoxazole Susceptible                       Susceptibility        Proteus mirabilis      SANDEEP      Amoxicillin + Clavulanate Susceptible      Ampicillin Susceptible      Ampicillin + Sulbactam Susceptible      Cefazolin  Susceptible      Cefepime Susceptible      Ceftazidime Susceptible      Ceftriaxone Susceptible      Gentamicin Susceptible      Levofloxacin Resistant      Nitrofurantoin Resistant      Piperacillin + Tazobactam Susceptible      Trimethoprim + Sulfamethoxazole Susceptible                       Susceptibility        Klebsiella pneumoniae ESBL      SANDEEP      Amikacin Susceptible      Ertapenem Susceptible      Gentamicin Resistant      Levofloxacin Susceptible      Meropenem Susceptible      Nitrofurantoin Intermediate      Piperacillin + Tazobactam Susceptible      Tobramycin Resistant      Trimethoprim + Sulfamethoxazole Susceptible                           Blood Culture - Blood, Arm, Right [[id number]]  (Normal) Collected: 03/27/24 2336    Lab Status: Final result Specimen: Blood from Arm, Right Updated: 04/02/24 0000     Blood Culture No growth at 5 days    Blood Culture - Blood, Arm, Right [[id number]]  (Abnormal) Collected: 03/27/24 2327    Lab Status: Final result Specimen: Blood from Arm, Right Updated: 03/31/24 0549     Blood Culture Corynebacterium species     Isolated from Pediatric Bottle     Gram Stain Pediatric Bottle Gram positive bacilli    Narrative:      Probable contaminant requires clinical correlation, susceptibility not performed unless requested by physician.     Blood culture does not meet the specified criteria for PCR identification.  If pregnant, immunocompromised, or clinical concern for meningitis, call lab to run BCID for Listeria monocytogenes.            Hospital Course:   UTI/sepsis present on admission with hypotension.  Sepsis resolved.  Status post 2 L bolus in ER.  ESBL from urine culture.  Change Zosyn to Invanz 3/30/2024.  Lactated ringer.  IV antibiotics recommended for 7 days and approved for administration at [facility]. Blood pressure has remained stable for over 48 hours with behavior at baseline.     Nephrectomy tube left side in place.  Follow-up with [facility] to replace  "nephrectomy tube outpatient.  Patient need to get out of here by Monday or Tuesday because she has appointment at [facility] for nephrectomy tube replacement on Wednesday this week.     Altered mental status.  Back to baseline.     Chronic respiratory failure.  Tracheotomy in place.  Robaxin.  Scopolamine patch.  Chest x-ray-Left lower lobar consolidation which may represent an acute inflammatory/infectious process, Atelectatic changes in the right lower lung persist.  Currently on 6 L / 28%.     Urinary retention.  Mojica cath in place.     Anemia.  Hemoglobin stable.  No sign of acute bleed.     Chronic pain/contracted.  Norco every 4 hours.  Baclofen every 4 hours.     Reflux . Protonix.     Nutrition.  G-tube in place.  Nutrition consult for tube feeding.  KUB-G-tube is in good position.      Nursing home patient .    Physical Exam on Discharge:  /90 (BP Location: Right leg, Patient Position: Lying)   Pulse 92   Temp 98.9 °F (37.2 °C) (Axillary)   Resp 16   Ht 152.4 cm (60\")   Wt 54.8 kg (120 lb 12.8 oz)   LMP  (LMP Unknown)   SpO2 100%   BMI 23.59 kg/m²   Physical Exam  Constitutional:       Comments: Contracted . chronically ill.     HENT:      Head: Normocephalic.      Comments: Neck lean to the right side, chronic condition.  Eyes:      Conjunctiva/sclera: Conjunctivae normal.      Pupils: Pupils are equal, round, and reactive to light.   Cardiovascular:      Rate and Rhythm: Regular rhythm.  Heart rates in the 90.     Heart sounds: Normal heart sounds.   Pulmonary:      Effort: No respiratory distress.      Comments: Trach in place.  Diminished breath sound bilateral, clear .  Abdominal:      General: Bowel sounds are normal. There is no distension.      Palpations: Abdomen is soft.      Tenderness: There is no abdominal tenderness.  Gastric tube in place.  Nephrectomy tube in place left side.  Musculoskeletal:         General: No swelling.      Cervical back: Neck supple.      Comments: " Contracted all extremities.  . Paraplegic.  Atrophy of the muscle.   Skin:     General: Skin is warm and dry.      Capillary Refill: Capillary refill takes 2 to 3 seconds.      Findings: No rash.   Neurological:      Mental Status: She is alert.     Condition on Discharge:   Stable    Discharge Disposition:  Skilled Nursing Facility (DC - External)    Discharge Medications:     Discharge Medications        New Medications        Instructions Start Date   ertapenem 1,000 mg in sodium chloride 0.9 % 100 mL IVPB   1,000 mg, Intravenous, Every 24 Hours             Continue These Medications        Instructions Start Date   acetaminophen 500 MG tablet  Commonly known as: TYLENOL   500 mg, Per G Tube, Every 4 Hours PRN, Not to exceed 3000mg from all sources daily       bisacodyl 10 MG suppository  Commonly known as: DULCOLAX   10 mg, Rectal, Every 48 Hours PRN      cetirizine 10 MG tablet  Commonly known as: zyrTEC   10 mg, Oral, Daily      chlorhexidine 0.12 % solution  Commonly known as: PERIDEX   15 mL, Mouth/Throat, 2 Times Daily, 15 ml using oral swab twice daily for oral care      fleet enema 7-19 GM/118ML enema   1 enema, Rectal, Every 48 Hours PRN      fluticasone 50 MCG/ACT nasal spray  Commonly known as: FLONASE   2 sprays, Nasal, Daily      guaifenesin 100 MG/5ML liquid  Commonly known as: ROBITUSSIN   600 mg, Per G Tube, 2 Times Daily      hydrOXYzine 25 MG tablet  Commonly known as: ATARAX   25 mg, Per G Tube, Every 6 Hours PRN      liver oil-zinc oxide 40 % ointment  Commonly known as: DESITIN   1 application , Topical, Every 4 Hours PRN      miconazole 2 % powder  Commonly known as: MICOTIN   1 Application, Topical, 2 Times Daily PRN, Apply to groin or gluteal folds for rash, itching, redness and/or irritation       nystatin [id number] UNIT/GM powder  Commonly known as: MYCOSTATIN   1 Application, Topical, 2 Times Daily, To bilateral bendarms, left abdominal fold, and under left breast       ondansetron 4  MG/5ML solution  Commonly known as: ZOFRAN   4 mg, Oral, Every 6 Hours PRN      Renacidin solution   60 mL, Irrigation, 2 Times Daily, Irrigate stratton catheter       Scopolamine 1 MG/3DAYS patch   1 patch, Transdermal, Every 72 Hours      simethicone 40 MG/0.6ML drops  Commonly known as: MYLICON   20 mg, Per G Tube, Every 6 Hours      sodium chloride 0.9 % irrigation  Commonly known as: NS   10 mL, Irrigation, Every 8 Hours, Irrigation for secretions: Hydration             Discharge Diet:   Diet Instructions       Diet: Nothing By Mouth      Discharge Diet: Nothing By Mouth        Continue tube feeding    Activity at Discharge:   Usual activity    Follow-up Appointments:   No future appointments.    Test Results Pending at Discharge: none    Electronically signed by Deepti Hernández MD, 04/02/24, 13:57 CDT.    Time: 36 minutes.         Electronically signed by Deepti Hernández MD at 04/02/24 1401       Discharge Order (From admission, onward)       Start     Ordered    04/02/24 1356  Discharge patient  Once        Expected Discharge Date: 04/02/24   Discharge Disposition: Skilled Nursing Facility (DC - External)   Physician of Record for Attribution - Please select from Treatment Team: DEEPTI HERNÁNDEZ [[id number]]   Review needed by CMO to determine Physician of Record: No      Question Answer Comment   Physician of Record for Attribution - Please select from Treatment Team DEEPTI HERNÁNDEZ    Review needed by CMO to determine Physician of Record No        04/02/24 7281

## 2024-04-02 NOTE — NURSING NOTE
Report given to Samantha at [facility], IV to remain in for antibiotics at facility.

## 2024-04-02 NOTE — DISCHARGE SUMMARY
[facility] Medicine Services  DISCHARGE SUMMARY       Date of Admission: 3/27/2024  Date of Discharge:  4/2/2024  Primary Care Physician: David Lara MD    Presenting Problem/History of Present Illness:  Patient brought to ED from nursing home as  was called and told that the patient had a low pulse and low blood pressure and her head was tilted abnormally. Initial presentation in the emergency department showed hypotension with blood pressure at 76/52. Patient is chronically ill and not very responsive at baseline. There is a indwelling Mojica catheter in place. This was changed out and a UA showed significant UTI. Patient received 2 L IV fluids and IV antibiotics and we were asked to admit for further care. Blood pressure did improve significantly with IV fluids and the patient is back to her baseline. Will admit to ICU     Final Discharge Diagnoses:  Active Hospital Problems    Diagnosis     **UTI (urinary tract infection)     History of anoxic brain injury     Functional quadriplegia     Severe malnutrition     Acute respiratory failure with hypoxia     Sepsis secondary to UTI        Consults: -    Procedures Performed: -    Pertinent Test Results:   Results for orders placed during the hospital encounter of 08/27/21    Adult Transthoracic Echo Complete W/ Cont if Necessary Per Protocol    Interpretation Summary  · Estimated left ventricular EF = 60% Left ventricular systolic function is normal.  · Left ventricular diastolic function is consistent with (grade I) impaired relaxation.  · The right ventricular cavity is mildly dilated. Systolic function is normal.  · The right atrial cavity is mildly dilated.  · There is mild aortic and tricuspid valve regurgitation present.  · Estimated right ventricular systolic pressure from tricuspid regurgitation is normal (<35 mmHg).      Imaging Results (All)       Procedure Component Value Units Date/Time    XR Abdomen  KUB [[id number]] Collected: 03/28/24 0845     Updated: 03/28/24 0850    Narrative:      XR ABDOMEN KUB- 3/28/2024 6:38 AM     HISTORY: G tube placement verification; R79.89-Other specified abnormal  findings of blood chemistry; T83.511A-Infection and inflammatory  reaction due to indwelling urethral catheter, initial encounter;  N39.0-Urinary tract infection, site not specified; E87.20-Acidosis,  unspecified; I95.9-Hypotension, unspecified     COMPARISON: Abdominal radiograph dated 3/19/2024     FINDINGS:     G-tube is present. Contrast was injected through the G-tube opacifying  the stomach and duodenum, contrast extending just beyond the ligament of  Treitz. There is a mild gaseous distention of the transverse colon.  Prominent stool in the sigmoid colon. No intraperitoneal free air.  Incidentally noted percutaneous internal/external drain.       Impression:         1.  G-tube is in good position.           This report was signed and finalized on 3/28/2024 8:47 AM by Dr Rob Ribeiro.       XR Chest 1 View [[id number]] Collected: 03/28/24 0632     Updated: 03/28/24 0638    Narrative:      EXAMINATION: XR CHEST 1 VW-     3/27/2024 10:21 PM     HISTORY: fever     A frontal projection of the chest is compared with the previous study  dated 2/16/2024.     The lungs are poorly expanded, worse than the previous study.     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     Linear opacity in the right lower lung persist and probably represent  discoid atelectasis as suggested previously.     There is no pulmonary congestion or pneumothorax.     The heart size is not optimally evaluated due to the AP projection.     A tracheostomy tube is in place.     No acute bony abnormality.       Impression:      1. Left lower lobar consolidation which may represent an acute  inflammatory/infectious process.  2. Atelectatic changes in the right  lower lung persist.        This report was signed and finalized on 3/28/2024 6:34 AM by Dr. Lui Bush MD.             LAB RESULTS:      Lab 04/02/24  0345 04/01/24  0703 03/31/24  0520 03/30/24  0350 03/29/24  0548 03/28/24  0431 03/27/24  2327   WBC 7.25 8.51 6.34 6.71 6.33 6.92 9.61   HEMOGLOBIN 10.8* 11.5* 10.9* 10.5* 10.0* 11.3* 10.2*   HEMATOCRIT 32.2* 34.2 32.0* 31.3* 31.1* 37.4 31.5*   PLATELETS 234 228 187 216 179 202 229   NEUTROS ABS  --   --   --   --   --  4.16 7.15*   IMMATURE GRANS (ABS)  --   --   --   --   --  0.04 0.03   LYMPHS ABS  --   --   --   --   --  1.74 1.51   MONOS ABS  --   --   --   --   --  0.52 0.52   EOS ABS  --   --   --   --   --  0.42* 0.38   MCV 91.2 90.5 90.7 91.5 94.5 101.1* 95.2   PROCALCITONIN  --   --   --   --   --   --  0.85*   LACTATE  --   --   --   --   --  1.0 2.2*         Lab 04/02/24  0345 04/01/24  0703 03/31/24  0418 03/30/24  0350 03/29/24  0548 03/28/24  0431 03/27/24  2327   SODIUM 139 136 139 137 140 139 140   POTASSIUM 3.6 4.4 3.9 3.7 4.1 4.2 4.0   CHLORIDE 103 101 107 102 104 106 101   CO2 25.0 23.0 22.0 23.0 23.0 20.0* 27.0   ANION GAP 11.0 12.0 10.0 12.0 13.0 13.0 12.0   BUN 10 14 11 15 19 28* 34*   CREATININE 0.30* 0.35* 0.35* 0.45* 0.41* 0.35* 0.46*   EGFR 132.7 127.8 127.8 120.3 123.1 127.8 119.7   GLUCOSE 104* 123* 110* 115* 103* 89 164*   CALCIUM 9.8 10.0 9.7 9.5 9.3 8.6 9.6   MAGNESIUM  --   --   --   --   --  2.2 2.2   PHOSPHORUS  --   --   --   --   --  2.5  --          Lab 03/28/24  0431 03/27/24  2327   TOTAL PROTEIN 6.2 6.8   ALBUMIN 3.2* 3.9   GLOBULIN 3.0 2.9   ALT (SGPT) 15 15   AST (SGOT) 21 17   BILIRUBIN 0.4 0.4   ALK PHOS 62 68         Lab 03/28/24  0128 03/27/24  2327   HSTROP T 82* 86*                 Brief Urine Lab Results  (Last result in the past 365 days)        Color   Clarity   Blood   Leuk Est   Nitrite   Protein   CREAT   Urine HCG        03/28/24 0006 Dark Yellow   Turbid   Large (3+)   Large (3+)   Positive   100 mg/dL  (2+)                 Microbiology Results (last 10 days)       Procedure Component Value - Date/Time    Urine Culture - Urine, Indwelling Urethral Catheter [[id number]]  (Abnormal)  (Susceptibility) Collected: 03/28/24 0006    Lab Status: Final result Specimen: Urine from Indwelling Urethral Catheter Updated: 03/31/24 1002     Urine Culture >100,000 CFU/mL Escherichia coli ESBL     Comment:   Consider infectious disease consult.  Susceptibility results may not correlate to clinical outcomes.         <10,000 CFU/mL Proteus mirabilis      <10,000 CFU/mL Klebsiella pneumoniae ESBL     Comment:   Consider infectious disease consult.  Susceptibility results may not correlate to clinical outcomes.       Narrative:      Colonization of the urinary tract without infection is common. Treatment is discouraged unless the patient is symptomatic, pregnant, or undergoing an invasive urologic procedure.  Recent outcomes data supports the use of pip/tazo in the treatment of susceptible ESBL infections for uncomplicated UTI. Consider use of pip/tazo as a carbapenem-sparing regimen in applicable patients.    Susceptibility        Escherichia coli ESBL      SANDEEP      Amikacin Susceptible      Ertapenem Susceptible      Gentamicin Resistant      Levofloxacin Resistant      Meropenem Susceptible      Nitrofurantoin Susceptible      Piperacillin + Tazobactam Susceptible      Tobramycin Resistant      Trimethoprim + Sulfamethoxazole Susceptible                       Susceptibility        Proteus mirabilis      SANDEEP      Amoxicillin + Clavulanate Susceptible      Ampicillin Susceptible      Ampicillin + Sulbactam Susceptible      Cefazolin Susceptible      Cefepime Susceptible      Ceftazidime Susceptible      Ceftriaxone Susceptible      Gentamicin Susceptible      Levofloxacin Resistant      Nitrofurantoin Resistant      Piperacillin + Tazobactam Susceptible      Trimethoprim + Sulfamethoxazole Susceptible                        Susceptibility        Klebsiella pneumoniae ESBL      SANDEEP      Amikacin Susceptible      Ertapenem Susceptible      Gentamicin Resistant      Levofloxacin Susceptible      Meropenem Susceptible      Nitrofurantoin Intermediate      Piperacillin + Tazobactam Susceptible      Tobramycin Resistant      Trimethoprim + Sulfamethoxazole Susceptible                           Blood Culture - Blood, Arm, Right [[id number]]  (Normal) Collected: 03/27/24 2336    Lab Status: Final result Specimen: Blood from Arm, Right Updated: 04/02/24 0000     Blood Culture No growth at 5 days    Blood Culture - Blood, Arm, Right [[id number]]  (Abnormal) Collected: 03/27/24 2327    Lab Status: Final result Specimen: Blood from Arm, Right Updated: 03/31/24 0549     Blood Culture Corynebacterium species     Isolated from Pediatric Bottle     Gram Stain Pediatric Bottle Gram positive bacilli    Narrative:      Probable contaminant requires clinical correlation, susceptibility not performed unless requested by physician.     Blood culture does not meet the specified criteria for PCR identification.  If pregnant, immunocompromised, or clinical concern for meningitis, call lab to run BCID for Listeria monocytogenes.            Hospital Course:   UTI/sepsis present on admission with hypotension.  Sepsis resolved.  Status post 2 L bolus in ER.  ESBL from urine culture.  Change Zosyn to Invanz 3/30/2024.  Lactated ringer.  IV antibiotics recommended for 7 days and approved for administration at [facility]. Blood pressure has remained stable for over 48 hours with behavior at baseline.     Nephrectomy tube left side in place.  Follow-up with [facility] to replace nephrectomy tube outpatient.  Patient need to get out of here by Monday or Tuesday because she has appointment at [facility] for nephrectomy tube replacement on Wednesday this week.     Altered mental status.  Back to baseline.     Chronic respiratory failure.  Tracheotomy in place.  Robaxin.   "Scopolamine patch.  Chest x-ray-Left lower lobar consolidation which may represent an acute inflammatory/infectious process, Atelectatic changes in the right lower lung persist.  Currently on 6 L / 28%.     Urinary retention.  Mojica cath in place.     Anemia.  Hemoglobin stable.  No sign of acute bleed.     Chronic pain/contracted.  Norco every 4 hours.  Baclofen every 4 hours.     Reflux . Protonix.     Nutrition.  G-tube in place.  Nutrition consult for tube feeding.  KUB-G-tube is in good position.      Nursing home patient .    Physical Exam on Discharge:  /90 (BP Location: Right leg, Patient Position: Lying)   Pulse 92   Temp 98.9 °F (37.2 °C) (Axillary)   Resp 16   Ht 152.4 cm (60\")   Wt 54.8 kg (120 lb 12.8 oz)   LMP  (LMP Unknown)   SpO2 100%   BMI 23.59 kg/m²   Physical Exam  Constitutional:       Comments: Contracted . chronically ill.     HENT:      Head: Normocephalic.      Comments: Neck lean to the right side, chronic condition.  Eyes:      Conjunctiva/sclera: Conjunctivae normal.      Pupils: Pupils are equal, round, and reactive to light.   Cardiovascular:      Rate and Rhythm: Regular rhythm.  Heart rates in the 90.     Heart sounds: Normal heart sounds.   Pulmonary:      Effort: No respiratory distress.      Comments: Trach in place.  Diminished breath sound bilateral, clear .  Abdominal:      General: Bowel sounds are normal. There is no distension.      Palpations: Abdomen is soft.      Tenderness: There is no abdominal tenderness.  Gastric tube in place.  Nephrectomy tube in place left side.  Musculoskeletal:         General: No swelling.      Cervical back: Neck supple.      Comments: Contracted all extremities.  . Paraplegic.  Atrophy of the muscle.   Skin:     General: Skin is warm and dry.      Capillary Refill: Capillary refill takes 2 to 3 seconds.      Findings: No rash.   Neurological:      Mental Status: She is alert.     Condition on Discharge:   Stable    Discharge " Disposition:  Skilled Nursing Facility (DC - External)    Discharge Medications:     Discharge Medications        New Medications        Instructions Start Date   ertapenem 1,000 mg in sodium chloride 0.9 % 100 mL IVPB   1,000 mg, Intravenous, Every 24 Hours             Continue These Medications        Instructions Start Date   acetaminophen 500 MG tablet  Commonly known as: TYLENOL   500 mg, Per G Tube, Every 4 Hours PRN, Not to exceed 3000mg from all sources daily       bisacodyl 10 MG suppository  Commonly known as: DULCOLAX   10 mg, Rectal, Every 48 Hours PRN      cetirizine 10 MG tablet  Commonly known as: zyrTEC   10 mg, Oral, Daily      chlorhexidine 0.12 % solution  Commonly known as: PERIDEX   15 mL, Mouth/Throat, 2 Times Daily, 15 ml using oral swab twice daily for oral care      fleet enema 7-19 GM/118ML enema   1 enema, Rectal, Every 48 Hours PRN      fluticasone 50 MCG/ACT nasal spray  Commonly known as: FLONASE   2 sprays, Nasal, Daily      guaifenesin 100 MG/5ML liquid  Commonly known as: ROBITUSSIN   600 mg, Per G Tube, 2 Times Daily      hydrOXYzine 25 MG tablet  Commonly known as: ATARAX   25 mg, Per G Tube, Every 6 Hours PRN      liver oil-zinc oxide 40 % ointment  Commonly known as: DESITIN   1 application , Topical, Every 4 Hours PRN      miconazole 2 % powder  Commonly known as: MICOTIN   1 Application, Topical, 2 Times Daily PRN, Apply to groin or gluteal folds for rash, itching, redness and/or irritation       nystatin 186099 UNIT/GM powder  Commonly known as: MYCOSTATIN   1 Application, Topical, 2 Times Daily, To bilateral bendarms, left abdominal fold, and under left breast       ondansetron 4 MG/5ML solution  Commonly known as: ZOFRAN   4 mg, Oral, Every 6 Hours PRN      Renacidin solution   60 mL, Irrigation, 2 Times Daily, Irrigate stratton catheter       Scopolamine 1 MG/3DAYS patch   1 patch, Transdermal, Every 72 Hours      simethicone 40 MG/0.6ML drops  Commonly known as: MYLICON   20  mg, Per G Tube, Every 6 Hours      sodium chloride 0.9 % irrigation  Commonly known as: NS   10 mL, Irrigation, Every 8 Hours, Irrigation for secretions: Hydration             Discharge Diet:   Diet Instructions       Diet: Nothing By Mouth      Discharge Diet: Nothing By Mouth        Continue tube feeding    Activity at Discharge:   Usual activity    Follow-up Appointments:   No future appointments.    Test Results Pending at Discharge: none    Electronically signed by David Hernández MD, 04/02/24, 13:57 CDT.    Time: 36 minutes.

## 2024-04-03 ENCOUNTER — TELEPHONE (OUTPATIENT)
Dept: INTERNAL MEDICINE | Age: 49
End: 2024-04-03

## 2024-04-03 NOTE — TELEPHONE ENCOUNTER
Care Transitions Initial Follow Up Call    Outreach made within 2 business days of discharge: Yes  Workman's comp claim? no  Patient: Yudi Villafana   Patient : 1975   MRN: [id number]   Reason for Admission: UTI  Discharge Date: 24.      Spoke with: Generic Voicemail, unable to leave a message. First attemtpt    Discharge department/facility: [facility]      Follow Up  No future appointments.    Poppy Del Real MA

## 2024-04-04 ENCOUNTER — TELEPHONE (OUTPATIENT)
Dept: INTERNAL MEDICINE | Age: 49
End: 2024-04-04

## 2024-04-04 NOTE — TELEPHONE ENCOUNTER
Care Transitions Initial Follow Up Call    Outreach made within 2 business days of discharge: Yes  Workman's comp claim?No  Patient: Yudi Villafana   Patient : 1975   MRN: [id number]    Reason for Admission: UTI  Discharge Date: 24      Spoke with: Generic voicemail, unable to leave a message. Second attempt    Discharge department/facility: [facility]      Poppy Del Real MA

## 2024-04-05 ENCOUNTER — APPOINTMENT (OUTPATIENT)
Dept: CT IMAGING | Facility: HOSPITAL | Age: 49
DRG: 380 | End: 2024-04-05
Payer: MEDICARE

## 2024-04-05 ENCOUNTER — HOSPITAL ENCOUNTER (INPATIENT)
Facility: HOSPITAL | Age: 49
LOS: 2 days | Discharge: NURSING, REHAB OR LONG-TERM CARE | DRG: 380 | End: 2024-04-08
Attending: STUDENT IN AN ORGANIZED HEALTH CARE EDUCATION/TRAINING PROGRAM | Admitting: FAMILY MEDICINE
Payer: MEDICARE

## 2024-04-05 DIAGNOSIS — K31.1 GASTRIC OUTLET OBSTRUCTION: Primary | ICD-10-CM

## 2024-04-05 LAB
ALBUMIN SERPL-MCNC: 4.4 G/DL (ref 3.5–5.2)
ALBUMIN/GLOB SERPL: 1.3 G/DL
ALP SERPL-CCNC: 86 U/L (ref 39–117)
ALT SERPL W P-5'-P-CCNC: 18 U/L (ref 1–33)
ANION GAP SERPL CALCULATED.3IONS-SCNC: 15 MMOL/L (ref 5–15)
AST SERPL-CCNC: 16 U/L (ref 1–32)
BASOPHILS # BLD AUTO: 0.03 10*3/MM3 (ref 0–0.2)
BASOPHILS NFR BLD AUTO: 0.3 % (ref 0–1.5)
BILIRUB SERPL-MCNC: 0.3 MG/DL (ref 0–1.2)
BUN SERPL-MCNC: 18 MG/DL (ref 6–20)
BUN/CREAT SERPL: 45 (ref 7–25)
CALCIUM SPEC-SCNC: 10.1 MG/DL (ref 8.6–10.5)
CHLORIDE SERPL-SCNC: 102 MMOL/L (ref 98–107)
CO2 SERPL-SCNC: 23 MMOL/L (ref 22–29)
CREAT SERPL-MCNC: 0.4 MG/DL (ref 0.57–1)
D-LACTATE SERPL-SCNC: 0.9 MMOL/L (ref 0.5–2)
DEPRECATED RDW RBC AUTO: 44 FL (ref 37–54)
EGFRCR SERPLBLD CKD-EPI 2021: 123.8 ML/MIN/1.73
EOSINOPHIL # BLD AUTO: 0.4 10*3/MM3 (ref 0–0.4)
EOSINOPHIL NFR BLD AUTO: 4 % (ref 0.3–6.2)
ERYTHROCYTE [DISTWIDTH] IN BLOOD BY AUTOMATED COUNT: 13.3 % (ref 12.3–15.4)
GLOBULIN UR ELPH-MCNC: 3.3 GM/DL
GLUCOSE SERPL-MCNC: 102 MG/DL (ref 65–99)
HCG SERPL QL: NEGATIVE
HCT VFR BLD AUTO: 34.3 % (ref 34–46.6)
HGB BLD-MCNC: 11.6 G/DL (ref 12–15.9)
IMM GRANULOCYTES # BLD AUTO: 0.04 10*3/MM3 (ref 0–0.05)
IMM GRANULOCYTES NFR BLD AUTO: 0.4 % (ref 0–0.5)
LIPASE SERPL-CCNC: 36 U/L (ref 13–60)
LYMPHOCYTES # BLD AUTO: 1.78 10*3/MM3 (ref 0.7–3.1)
LYMPHOCYTES NFR BLD AUTO: 17.9 % (ref 19.6–45.3)
MAGNESIUM SERPL-MCNC: 2.1 MG/DL (ref 1.6–2.6)
MCH RBC QN AUTO: 30.8 PG (ref 26.6–33)
MCHC RBC AUTO-ENTMCNC: 33.8 G/DL (ref 31.5–35.7)
MCV RBC AUTO: 91 FL (ref 79–97)
MONOCYTES # BLD AUTO: 0.79 10*3/MM3 (ref 0.1–0.9)
MONOCYTES NFR BLD AUTO: 7.9 % (ref 5–12)
NEUTROPHILS NFR BLD AUTO: 6.9 10*3/MM3 (ref 1.7–7)
NEUTROPHILS NFR BLD AUTO: 69.5 % (ref 42.7–76)
NRBC BLD AUTO-RTO: 0 /100 WBC (ref 0–0.2)
PLATELET # BLD AUTO: 304 10*3/MM3 (ref 140–450)
PMV BLD AUTO: 9.6 FL (ref 6–12)
POTASSIUM SERPL-SCNC: 4.5 MMOL/L (ref 3.5–5.2)
PROT SERPL-MCNC: 7.7 G/DL (ref 6–8.5)
RBC # BLD AUTO: 3.77 10*6/MM3 (ref 3.77–5.28)
SODIUM SERPL-SCNC: 140 MMOL/L (ref 136–145)
WBC NRBC COR # BLD AUTO: 9.94 10*3/MM3 (ref 3.4–10.8)

## 2024-04-05 PROCEDURE — 25010000002 ONDANSETRON PER 1 MG: Performed by: STUDENT IN AN ORGANIZED HEALTH CARE EDUCATION/TRAINING PROGRAM

## 2024-04-05 PROCEDURE — 25010000002 FENTANYL CITRATE (PF) 50 MCG/ML SOLUTION: Performed by: STUDENT IN AN ORGANIZED HEALTH CARE EDUCATION/TRAINING PROGRAM

## 2024-04-05 PROCEDURE — 99285 EMERGENCY DEPT VISIT HI MDM: CPT

## 2024-04-05 PROCEDURE — 94799 UNLISTED PULMONARY SVC/PX: CPT

## 2024-04-05 PROCEDURE — 84703 CHORIONIC GONADOTROPIN ASSAY: CPT | Performed by: STUDENT IN AN ORGANIZED HEALTH CARE EDUCATION/TRAINING PROGRAM

## 2024-04-05 PROCEDURE — 85025 COMPLETE CBC W/AUTO DIFF WBC: CPT | Performed by: STUDENT IN AN ORGANIZED HEALTH CARE EDUCATION/TRAINING PROGRAM

## 2024-04-05 PROCEDURE — 74177 CT ABD & PELVIS W/CONTRAST: CPT

## 2024-04-05 PROCEDURE — 83605 ASSAY OF LACTIC ACID: CPT | Performed by: STUDENT IN AN ORGANIZED HEALTH CARE EDUCATION/TRAINING PROGRAM

## 2024-04-05 PROCEDURE — 94760 N-INVAS EAR/PLS OXIMETRY 1: CPT

## 2024-04-05 PROCEDURE — 25810000003 LACTATED RINGERS SOLUTION: Performed by: STUDENT IN AN ORGANIZED HEALTH CARE EDUCATION/TRAINING PROGRAM

## 2024-04-05 PROCEDURE — 94761 N-INVAS EAR/PLS OXIMETRY MLT: CPT

## 2024-04-05 PROCEDURE — 83690 ASSAY OF LIPASE: CPT | Performed by: STUDENT IN AN ORGANIZED HEALTH CARE EDUCATION/TRAINING PROGRAM

## 2024-04-05 PROCEDURE — 83735 ASSAY OF MAGNESIUM: CPT | Performed by: STUDENT IN AN ORGANIZED HEALTH CARE EDUCATION/TRAINING PROGRAM

## 2024-04-05 PROCEDURE — 80053 COMPREHEN METABOLIC PANEL: CPT | Performed by: STUDENT IN AN ORGANIZED HEALTH CARE EDUCATION/TRAINING PROGRAM

## 2024-04-05 PROCEDURE — 36415 COLL VENOUS BLD VENIPUNCTURE: CPT

## 2024-04-05 RX ORDER — ONDANSETRON 2 MG/ML
4 INJECTION INTRAMUSCULAR; INTRAVENOUS ONCE
Status: COMPLETED | OUTPATIENT
Start: 2024-04-05 | End: 2024-04-05

## 2024-04-05 RX ORDER — FENTANYL CITRATE 50 UG/ML
50 INJECTION, SOLUTION INTRAMUSCULAR; INTRAVENOUS ONCE
Status: COMPLETED | OUTPATIENT
Start: 2024-04-05 | End: 2024-04-05

## 2024-04-05 RX ADMIN — SODIUM CHLORIDE, POTASSIUM CHLORIDE, SODIUM LACTATE AND CALCIUM CHLORIDE 1000 ML: 600; 310; 30; 20 INJECTION, SOLUTION INTRAVENOUS at 22:41

## 2024-04-05 RX ADMIN — ONDANSETRON 4 MG: 2 INJECTION INTRAMUSCULAR; INTRAVENOUS at 22:35

## 2024-04-05 RX ADMIN — FENTANYL CITRATE 50 MCG: 50 INJECTION, SOLUTION INTRAMUSCULAR; INTRAVENOUS at 22:38

## 2024-04-06 ENCOUNTER — APPOINTMENT (OUTPATIENT)
Dept: GENERAL RADIOLOGY | Facility: HOSPITAL | Age: 49
DRG: 380 | End: 2024-04-06
Payer: MEDICARE

## 2024-04-06 PROBLEM — K31.1 GASTRIC OUTLET OBSTRUCTION: Status: ACTIVE | Noted: 2024-04-06

## 2024-04-06 LAB
ALBUMIN SERPL-MCNC: 4.3 G/DL (ref 3.5–5.2)
ALBUMIN/GLOB SERPL: 1.4 G/DL
ALP SERPL-CCNC: 86 U/L (ref 39–117)
ALT SERPL W P-5'-P-CCNC: 17 U/L (ref 1–33)
ANION GAP SERPL CALCULATED.3IONS-SCNC: 12 MMOL/L (ref 5–15)
APTT PPP: 29.5 SECONDS (ref 24.5–36)
AST SERPL-CCNC: 16 U/L (ref 1–32)
BASOPHILS # BLD AUTO: 0.03 10*3/MM3 (ref 0–0.2)
BASOPHILS NFR BLD AUTO: 0.3 % (ref 0–1.5)
BILIRUB SERPL-MCNC: 0.5 MG/DL (ref 0–1.2)
BUN SERPL-MCNC: 17 MG/DL (ref 6–20)
BUN/CREAT SERPL: 42.5 (ref 7–25)
CALCIUM SPEC-SCNC: 10 MG/DL (ref 8.6–10.5)
CHLORIDE SERPL-SCNC: 104 MMOL/L (ref 98–107)
CO2 SERPL-SCNC: 24 MMOL/L (ref 22–29)
CREAT SERPL-MCNC: 0.4 MG/DL (ref 0.57–1)
DEPRECATED RDW RBC AUTO: 45.1 FL (ref 37–54)
EGFRCR SERPLBLD CKD-EPI 2021: 123.8 ML/MIN/1.73
EOSINOPHIL # BLD AUTO: 0.12 10*3/MM3 (ref 0–0.4)
EOSINOPHIL NFR BLD AUTO: 1.2 % (ref 0.3–6.2)
ERYTHROCYTE [DISTWIDTH] IN BLOOD BY AUTOMATED COUNT: 13.2 % (ref 12.3–15.4)
GLOBULIN UR ELPH-MCNC: 3.1 GM/DL
GLUCOSE SERPL-MCNC: 111 MG/DL (ref 65–99)
HCT VFR BLD AUTO: 34.1 % (ref 34–46.6)
HGB BLD-MCNC: 11 G/DL (ref 12–15.9)
IMM GRANULOCYTES # BLD AUTO: 0.03 10*3/MM3 (ref 0–0.05)
IMM GRANULOCYTES NFR BLD AUTO: 0.3 % (ref 0–0.5)
INR PPP: 0.99 (ref 0.91–1.09)
LYMPHOCYTES # BLD AUTO: 1.39 10*3/MM3 (ref 0.7–3.1)
LYMPHOCYTES NFR BLD AUTO: 14.3 % (ref 19.6–45.3)
MAGNESIUM SERPL-MCNC: 2.1 MG/DL (ref 1.6–2.6)
MCH RBC QN AUTO: 30.3 PG (ref 26.6–33)
MCHC RBC AUTO-ENTMCNC: 32.3 G/DL (ref 31.5–35.7)
MCV RBC AUTO: 93.9 FL (ref 79–97)
MONOCYTES # BLD AUTO: 0.73 10*3/MM3 (ref 0.1–0.9)
MONOCYTES NFR BLD AUTO: 7.5 % (ref 5–12)
NEUTROPHILS NFR BLD AUTO: 7.42 10*3/MM3 (ref 1.7–7)
NEUTROPHILS NFR BLD AUTO: 76.4 % (ref 42.7–76)
NRBC BLD AUTO-RTO: 0 /100 WBC (ref 0–0.2)
PHOSPHATE SERPL-MCNC: 2.6 MG/DL (ref 2.5–4.5)
PLATELET # BLD AUTO: 280 10*3/MM3 (ref 140–450)
PMV BLD AUTO: 9.2 FL (ref 6–12)
POTASSIUM SERPL-SCNC: 4.3 MMOL/L (ref 3.5–5.2)
PROT SERPL-MCNC: 7.4 G/DL (ref 6–8.5)
PROTHROMBIN TIME: 13.5 SECONDS (ref 11.8–14.8)
RBC # BLD AUTO: 3.63 10*6/MM3 (ref 3.77–5.28)
SODIUM SERPL-SCNC: 140 MMOL/L (ref 136–145)
WBC NRBC COR # BLD AUTO: 9.72 10*3/MM3 (ref 3.4–10.8)

## 2024-04-06 PROCEDURE — 85730 THROMBOPLASTIN TIME PARTIAL: CPT | Performed by: INTERNAL MEDICINE

## 2024-04-06 PROCEDURE — 25810000003 LACTATED RINGERS SOLUTION: Performed by: STUDENT IN AN ORGANIZED HEALTH CARE EDUCATION/TRAINING PROGRAM

## 2024-04-06 PROCEDURE — 25010000002 METOCLOPRAMIDE PER 10 MG: Performed by: STUDENT IN AN ORGANIZED HEALTH CARE EDUCATION/TRAINING PROGRAM

## 2024-04-06 PROCEDURE — 74018 RADEX ABDOMEN 1 VIEW: CPT

## 2024-04-06 PROCEDURE — 25010000002 MORPHINE PER 10 MG: Performed by: STUDENT IN AN ORGANIZED HEALTH CARE EDUCATION/TRAINING PROGRAM

## 2024-04-06 PROCEDURE — 85610 PROTHROMBIN TIME: CPT | Performed by: INTERNAL MEDICINE

## 2024-04-06 PROCEDURE — 94760 N-INVAS EAR/PLS OXIMETRY 1: CPT

## 2024-04-06 PROCEDURE — 25010000002 ERTAPENEM PER 500 MG: Performed by: STUDENT IN AN ORGANIZED HEALTH CARE EDUCATION/TRAINING PROGRAM

## 2024-04-06 PROCEDURE — 25810000003 LACTATED RINGERS PER 1000 ML: Performed by: STUDENT IN AN ORGANIZED HEALTH CARE EDUCATION/TRAINING PROGRAM

## 2024-04-06 PROCEDURE — 80053 COMPREHEN METABOLIC PANEL: CPT | Performed by: STUDENT IN AN ORGANIZED HEALTH CARE EDUCATION/TRAINING PROGRAM

## 2024-04-06 PROCEDURE — 25510000001 IOPAMIDOL 61 % SOLUTION: Performed by: STUDENT IN AN ORGANIZED HEALTH CARE EDUCATION/TRAINING PROGRAM

## 2024-04-06 PROCEDURE — 94761 N-INVAS EAR/PLS OXIMETRY MLT: CPT

## 2024-04-06 PROCEDURE — 84100 ASSAY OF PHOSPHORUS: CPT | Performed by: STUDENT IN AN ORGANIZED HEALTH CARE EDUCATION/TRAINING PROGRAM

## 2024-04-06 PROCEDURE — 83735 ASSAY OF MAGNESIUM: CPT | Performed by: STUDENT IN AN ORGANIZED HEALTH CARE EDUCATION/TRAINING PROGRAM

## 2024-04-06 PROCEDURE — 99222 1ST HOSP IP/OBS MODERATE 55: CPT | Performed by: SPECIALIST

## 2024-04-06 PROCEDURE — 94799 UNLISTED PULMONARY SVC/PX: CPT

## 2024-04-06 PROCEDURE — 85025 COMPLETE CBC W/AUTO DIFF WBC: CPT | Performed by: STUDENT IN AN ORGANIZED HEALTH CARE EDUCATION/TRAINING PROGRAM

## 2024-04-06 RX ORDER — HYDROCODONE BITARTRATE AND ACETAMINOPHEN 5; 325 MG/1; MG/1
1 TABLET ORAL EVERY 4 HOURS PRN
COMMUNITY
End: 2024-04-08 | Stop reason: HOSPADM

## 2024-04-06 RX ORDER — SODIUM CHLORIDE 0.9 % (FLUSH) 0.9 %
10 SYRINGE (ML) INJECTION AS NEEDED
Status: DISCONTINUED | OUTPATIENT
Start: 2024-04-06 | End: 2024-04-08 | Stop reason: HOSPADM

## 2024-04-06 RX ORDER — BACLOFEN 20 MG/1
20 TABLET ORAL EVERY 6 HOURS
Status: ON HOLD | COMMUNITY
End: 2024-04-08

## 2024-04-06 RX ORDER — PANTOPRAZOLE SODIUM 40 MG/10ML
40 INJECTION, POWDER, LYOPHILIZED, FOR SOLUTION INTRAVENOUS EVERY 12 HOURS SCHEDULED
Status: DISCONTINUED | OUTPATIENT
Start: 2024-04-06 | End: 2024-04-08 | Stop reason: HOSPADM

## 2024-04-06 RX ORDER — MORPHINE SULFATE 2 MG/ML
2 INJECTION, SOLUTION INTRAMUSCULAR; INTRAVENOUS ONCE
Status: COMPLETED | OUTPATIENT
Start: 2024-04-06 | End: 2024-04-06

## 2024-04-06 RX ORDER — NITROGLYCERIN 0.4 MG/1
0.4 TABLET SUBLINGUAL
Status: DISCONTINUED | OUTPATIENT
Start: 2024-04-06 | End: 2024-04-08 | Stop reason: HOSPADM

## 2024-04-06 RX ORDER — POLYETHYLENE GLYCOL 3350 17 G/17G
17 POWDER, FOR SOLUTION ORAL DAILY
COMMUNITY

## 2024-04-06 RX ORDER — ONDANSETRON 2 MG/ML
4 INJECTION INTRAMUSCULAR; INTRAVENOUS EVERY 6 HOURS PRN
Status: DISCONTINUED | OUTPATIENT
Start: 2024-04-06 | End: 2024-04-08 | Stop reason: HOSPADM

## 2024-04-06 RX ORDER — POLYETHYLENE GLYCOL 3350 17 G/17G
17 POWDER, FOR SOLUTION ORAL DAILY PRN
Status: DISCONTINUED | OUTPATIENT
Start: 2024-04-06 | End: 2024-04-08 | Stop reason: HOSPADM

## 2024-04-06 RX ORDER — NALOXONE HCL 0.4 MG/ML
0.4 VIAL (ML) INJECTION
Status: DISCONTINUED | OUTPATIENT
Start: 2024-04-06 | End: 2024-04-08 | Stop reason: HOSPADM

## 2024-04-06 RX ORDER — SODIUM CHLORIDE 0.9 % (FLUSH) 0.9 %
10 SYRINGE (ML) INJECTION EVERY 12 HOURS SCHEDULED
Status: DISCONTINUED | OUTPATIENT
Start: 2024-04-06 | End: 2024-04-08 | Stop reason: HOSPADM

## 2024-04-06 RX ORDER — HYDROMORPHONE HYDROCHLORIDE 1 MG/ML
0.5 INJECTION, SOLUTION INTRAMUSCULAR; INTRAVENOUS; SUBCUTANEOUS ONCE
Status: DISCONTINUED | OUTPATIENT
Start: 2024-04-06 | End: 2024-04-07

## 2024-04-06 RX ORDER — ACETAMINOPHEN 650 MG/1
650 SUPPOSITORY RECTAL EVERY 4 HOURS PRN
Status: DISCONTINUED | OUTPATIENT
Start: 2024-04-06 | End: 2024-04-08 | Stop reason: HOSPADM

## 2024-04-06 RX ORDER — BISACODYL 10 MG
10 SUPPOSITORY, RECTAL RECTAL DAILY PRN
Status: DISCONTINUED | OUTPATIENT
Start: 2024-04-06 | End: 2024-04-08 | Stop reason: HOSPADM

## 2024-04-06 RX ORDER — ONDANSETRON 4 MG/1
4 TABLET, ORALLY DISINTEGRATING ORAL EVERY 6 HOURS PRN
Status: DISCONTINUED | OUTPATIENT
Start: 2024-04-06 | End: 2024-04-08 | Stop reason: HOSPADM

## 2024-04-06 RX ORDER — BISACODYL 5 MG/1
5 TABLET, DELAYED RELEASE ORAL DAILY PRN
Status: DISCONTINUED | OUTPATIENT
Start: 2024-04-06 | End: 2024-04-08 | Stop reason: HOSPADM

## 2024-04-06 RX ORDER — ACETAMINOPHEN 325 MG/1
650 TABLET ORAL EVERY 4 HOURS PRN
Status: DISCONTINUED | OUTPATIENT
Start: 2024-04-06 | End: 2024-04-08 | Stop reason: HOSPADM

## 2024-04-06 RX ORDER — IPRATROPIUM BROMIDE AND ALBUTEROL SULFATE 2.5; .5 MG/3ML; MG/3ML
3 SOLUTION RESPIRATORY (INHALATION) EVERY 4 HOURS PRN
COMMUNITY

## 2024-04-06 RX ORDER — AMOXICILLIN 250 MG
2 CAPSULE ORAL 2 TIMES DAILY PRN
Status: DISCONTINUED | OUTPATIENT
Start: 2024-04-06 | End: 2024-04-08 | Stop reason: HOSPADM

## 2024-04-06 RX ORDER — HYDROXYZINE PAMOATE 25 MG/1
25 CAPSULE ORAL EVERY 6 HOURS PRN
COMMUNITY

## 2024-04-06 RX ORDER — AMOXICILLIN 250 MG
2 CAPSULE ORAL 2 TIMES DAILY
COMMUNITY

## 2024-04-06 RX ORDER — TIZANIDINE 4 MG/1
4 TABLET ORAL EVERY 8 HOURS
COMMUNITY
End: 2024-04-08 | Stop reason: HOSPADM

## 2024-04-06 RX ORDER — ACETAMINOPHEN 160 MG/5ML
650 SOLUTION ORAL EVERY 4 HOURS PRN
Status: DISCONTINUED | OUTPATIENT
Start: 2024-04-06 | End: 2024-04-08 | Stop reason: HOSPADM

## 2024-04-06 RX ORDER — MULTIPLE VITAMINS W/ MINERALS TAB 9MG-400MCG
1 TAB ORAL EVERY MORNING
COMMUNITY

## 2024-04-06 RX ORDER — MORPHINE SULFATE 2 MG/ML
2 INJECTION, SOLUTION INTRAMUSCULAR; INTRAVENOUS EVERY 4 HOURS PRN
Status: DISCONTINUED | OUTPATIENT
Start: 2024-04-06 | End: 2024-04-08 | Stop reason: SDUPTHER

## 2024-04-06 RX ORDER — CARBOXYMETHYLCELLULOSE SODIUM 5 MG/ML
1 SOLUTION/ DROPS OPHTHALMIC 2 TIMES DAILY
COMMUNITY

## 2024-04-06 RX ORDER — MORPHINE SULFATE 2 MG/ML
1 INJECTION, SOLUTION INTRAMUSCULAR; INTRAVENOUS EVERY 4 HOURS PRN
Status: DISCONTINUED | OUTPATIENT
Start: 2024-04-06 | End: 2024-04-07

## 2024-04-06 RX ORDER — METOCLOPRAMIDE HYDROCHLORIDE 5 MG/ML
10 INJECTION INTRAMUSCULAR; INTRAVENOUS ONCE
Status: COMPLETED | OUTPATIENT
Start: 2024-04-06 | End: 2024-04-06

## 2024-04-06 RX ORDER — SODIUM CHLORIDE 9 MG/ML
40 INJECTION, SOLUTION INTRAVENOUS AS NEEDED
Status: DISCONTINUED | OUTPATIENT
Start: 2024-04-06 | End: 2024-04-08 | Stop reason: HOSPADM

## 2024-04-06 RX ORDER — SODIUM CHLORIDE, SODIUM LACTATE, POTASSIUM CHLORIDE, CALCIUM CHLORIDE 600; 310; 30; 20 MG/100ML; MG/100ML; MG/100ML; MG/100ML
100 INJECTION, SOLUTION INTRAVENOUS CONTINUOUS
Status: DISCONTINUED | OUTPATIENT
Start: 2024-04-06 | End: 2024-04-08 | Stop reason: HOSPADM

## 2024-04-06 RX ORDER — ROSUVASTATIN CALCIUM 5 MG/1
5 TABLET, COATED ORAL NIGHTLY
COMMUNITY

## 2024-04-06 RX ADMIN — PANTOPRAZOLE SODIUM 40 MG: 40 INJECTION, POWDER, FOR SOLUTION INTRAVENOUS at 20:27

## 2024-04-06 RX ADMIN — SODIUM CHLORIDE, POTASSIUM CHLORIDE, SODIUM LACTATE AND CALCIUM CHLORIDE 100 ML/HR: 600; 310; 30; 20 INJECTION, SOLUTION INTRAVENOUS at 21:25

## 2024-04-06 RX ADMIN — MORPHINE SULFATE 2 MG: 2 INJECTION, SOLUTION INTRAMUSCULAR; INTRAVENOUS at 05:28

## 2024-04-06 RX ADMIN — ERTAPENEM 1000 MG: 1 INJECTION INTRAMUSCULAR; INTRAVENOUS at 08:52

## 2024-04-06 RX ADMIN — PANTOPRAZOLE SODIUM 40 MG: 40 INJECTION, POWDER, FOR SOLUTION INTRAVENOUS at 10:12

## 2024-04-06 RX ADMIN — SODIUM CHLORIDE, POTASSIUM CHLORIDE, SODIUM LACTATE AND CALCIUM CHLORIDE 1000 ML: 600; 310; 30; 20 INJECTION, SOLUTION INTRAVENOUS at 05:33

## 2024-04-06 RX ADMIN — Medication 10 ML: at 20:27

## 2024-04-06 RX ADMIN — METOCLOPRAMIDE HYDROCHLORIDE 10 MG: 5 INJECTION INTRAMUSCULAR; INTRAVENOUS at 03:54

## 2024-04-06 RX ADMIN — MORPHINE SULFATE 1 MG: 2 INJECTION, SOLUTION INTRAMUSCULAR; INTRAVENOUS at 15:25

## 2024-04-06 RX ADMIN — SODIUM CHLORIDE, POTASSIUM CHLORIDE, SODIUM LACTATE AND CALCIUM CHLORIDE 100 ML/HR: 600; 310; 30; 20 INJECTION, SOLUTION INTRAVENOUS at 08:52

## 2024-04-06 RX ADMIN — MORPHINE SULFATE 2 MG: 2 INJECTION, SOLUTION INTRAMUSCULAR; INTRAVENOUS at 18:24

## 2024-04-06 RX ADMIN — ACETAMINOPHEN 650 MG: 650 SUPPOSITORY RECTAL at 20:27

## 2024-04-06 RX ADMIN — MORPHINE SULFATE 2 MG: 2 INJECTION, SOLUTION INTRAMUSCULAR; INTRAVENOUS at 03:15

## 2024-04-06 RX ADMIN — IOPAMIDOL 100 ML: 612 INJECTION, SOLUTION INTRAVENOUS at 00:11

## 2024-04-06 RX ADMIN — MORPHINE SULFATE 2 MG: 2 INJECTION, SOLUTION INTRAMUSCULAR; INTRAVENOUS at 22:39

## 2024-04-06 NOTE — ED NOTES
Nursing report ED to floor  Namita Zabala  48 y.o.  female    HPI:   Chief Complaint   Patient presents with    Vomiting       Admitting doctor:   Carter Kellogg DO    Consulting provider(s):  Consults       Date and Time Order Name Status Description    4/6/2024  4:30 AM Inpatient Gastroenterology Consult               Admitting diagnosis:   There were no encounter diagnoses.    Code status:   Current Code Status       Date Active Code Status Order ID Comments User Context       4/6/2024 0440 No CPR (Do Not Attempt to Resuscitate) [id number]  Carter Kellogg DO ED        Question Answer    Code Status (Patient has no pulse and is not breathing) No CPR (Do Not Attempt to Resuscitate)    Medical Interventions (Patient has pulse or is breathing) Limited Support    Medical Intervention Limits: NO intubation (DNI)     NO cardioversion                    Allergies:   Coconut, Levaquin [levofloxacin], Nuts, Penicillins, and [place]    Intake and Output    Intake/Output Summary (Last 24 hours) at 4/6/2024 0547  Last data filed at 4/6/2024 0545  Gross per 24 hour   Intake --   Output 600 ml   Net -600 ml       Weight:       04/05/24  2200   Weight: 61.9 kg (136 lb 6.4 oz)       Most recent vitals:   Vitals:    04/06/24 0400 04/06/24 0430 04/06/24 0500 04/06/24 0531   BP: 135/61 141/80 144/78 144/92   Pulse: (!) 135 (!) 136 (!) 138 (!) 138   Resp:       Temp:    98.8 °F (37.1 °C)   TempSrc:    Axillary   SpO2: 99% 98% 99% 98%   Weight:       Height:         Oxygen Therapy: .    Active LDAs/IV Access:   Lines, Drains & Airways       Active LDAs       Name Placement date Placement time Site Days    Peripheral IV 03/27/24 2341 Posterior;Right Hand 03/27/24  2341  Hand  9    Nephrostomy Left --  present on assessment  --  present on assessment  Left  --    Gastrostomy/Enterostomy  LUQ 02/08/24  1230  LUQ  57    Urethral Catheter Silicone 16 Fr. 03/27/24  2359  -- 9    Surgical Airway Uncuffed 6 --  --  6  --                     Labs (abnormal labs have a star):   Labs Reviewed   COMPREHENSIVE METABOLIC PANEL - Abnormal; Notable for the following components:       Result Value    Glucose 102 (*)     Creatinine 0.40 (*)     BUN/Creatinine Ratio 45.0 (*)     All other components within normal limits    Narrative:     GFR Normal >60  Chronic Kidney Disease <60  Kidney Failure <15     CBC WITH AUTO DIFFERENTIAL - Abnormal; Notable for the following components:    Hemoglobin 11.6 (*)     Lymphocyte % 17.9 (*)     All other components within normal limits   MAGNESIUM - Normal   LIPASE - Normal   LACTIC ACID, PLASMA - Normal   HCG, SERUM, QUALITATIVE - Normal   CBC AND DIFFERENTIAL    Narrative:     The following orders were created for panel order CBC & Differential.  Procedure                               Abnormality         Status                     ---------                               -----------         ------                     CBC Auto Differential[[id number]]        Abnormal            Final result                 Please view results for these tests on the individual orders.       Meds given in ED:   Medications   lactated ringers bolus 1,000 mL (1,000 mL Intravenous New Bag 4/6/24 0533)   HYDROmorphone (DILAUDID) injection 0.5 mg (has no administration in time range)   lactated ringers bolus 1,000 mL (0 mL Intravenous Stopped 4/5/24 2311)   fentaNYL citrate (PF) (SUBLIMAZE) injection 50 mcg (50 mcg Intravenous Given 4/5/24 2238)   ondansetron (ZOFRAN) injection 4 mg (4 mg Intravenous Given 4/5/24 2235)   iopamidol (ISOVUE-300) 61 % injection 100 mL (100 mL Intravenous Given 4/6/24 0011)   Morphine sulfate (PF) injection 2 mg (2 mg Intravenous Given 4/6/24 0315)   metoclopramide (REGLAN) injection 10 mg (10 mg Intravenous Given 4/6/24 0354)   Morphine sulfate (PF) injection 2 mg (2 mg Intravenous Given 4/6/24 0528)           NIH Stroke Scale:       Isolation/Infection(s):  No active isolations   ESBL Klebsiella, ESBL E coli      COVID Testing  Collected .  Resulted .    Nursing report ED to floor:  Mental status: . alert  Ambulatory status: .  Precautions: .    ED nurse phone extentsion- ..   [id number]

## 2024-04-06 NOTE — ED PROVIDER NOTES
Subjective   History of Present Illness  Patient presents with vomiting.  Occurred x 2.  Occurred at the nursing home.  Nonbloody nonbilious.  The  also feels she has some abdominal distention.  Recently admitted and treated with Invanz for infection; continues on these infusions at the nursing facility.  History of left-sided nephrostomy tube.  Has a history of bladder rupture.  Patient states no mental status changes, she appears in pain, has missed a dose of baclofen.    Review of Systems   Unable to perform ROS: Patient nonverbal       Past Medical History:   Diagnosis Date    Acute kidney failure, unspecified     Angina at rest     Anoxic brain damage, not elsewhere classified     Chronic pulmonary embolism     Chronic respiratory failure, unspecified whether with hypoxia or hypercapnia     Dependence on respirator (ventilator) status     Gastrointestinal hemorrhage, unspecified     Hypercalcemia     Iron deficiency anemia     Metabolic encephalopathy     Migraine     Sees Pain Management for injections.     MVP (mitral valve prolapse)     Other dysphagia     Other pulmonary embolism with acute cor pulmonale     Renal disorder     Ruptured bladder     Syncope     Urinary tract infection, site not specified        Allergies   Allergen Reactions    Coconut Unknown - High Severity    Levaquin [Levofloxacin] Other (See Comments)     unknown    Nuts Unknown - High Severity    Penicillins Rash     Patient's father noted patient had taken penicillin, many years ago, many times and then started developing a rash when she would take it.  She has received cefepime, ceftriaxone and cephalexin with no known adverse problems    Tur[place] Other (See Comments)     Causes migraines per pt reports       Past Surgical History:   Procedure Laterality Date    ABDOMINAL SURGERY      BREAST BIOPSY Right 2011    benign    GTUBE INSERTION      INSERTION HEMODIALYSIS CATHETER Left 09/16/2021    Procedure: HEMODIALYSIS CATHETER  INSERTION;  Surgeon: Anders Kaur MD;  Location: [facility] HYBRID OR 12;  Service: Vascular;  Laterality: Left;    TONSILLECTOMY      TRACHEOSTOMY         Family History   Problem Relation Age of Onset    Colon cancer Maternal Aunt 52    Fibromyalgia Mother     Heart disease Mother     Hypertension Mother     Hodgkin's lymphoma Paternal Grandmother     Ovarian cancer Neg Hx     Breast cancer Neg Hx        Social History     Socioeconomic History    Marital status:    Tobacco Use    Smoking status: Never    Smokeless tobacco: Never   Vaping Use    Vaping status: Never Used   Substance and Sexual Activity    Alcohol use: No    Drug use: No    Sexual activity: Defer     Birth control/protection: OCP           Objective   Physical Exam  Vitals reviewed.   Constitutional:       General: She is not in acute distress.  HENT:      Head: Normocephalic and atraumatic.   Eyes:      Extraocular Movements: Extraocular movements intact.      Conjunctiva/sclera: Conjunctivae normal.   Cardiovascular:      Pulses: Normal pulses.      Heart sounds: Normal heart sounds.   Pulmonary:      Effort: Pulmonary effort is normal. No respiratory distress.      Breath sounds: Normal breath sounds. No wheezing.   Abdominal:      General: There is no distension.      Palpations: Abdomen is soft.      Tenderness: There is abdominal tenderness.      Comments: G tube site, nephrostomy tube, cdi   Musculoskeletal:      Cervical back: Normal range of motion and neck supple.      Right lower leg: No edema.      Left lower leg: No edema.   Skin:     General: Skin is warm and dry.      Capillary Refill: Capillary refill takes less than 2 seconds.   Neurological:      Mental Status: She is alert. Mental status is at baseline.      Comments: No new FND   Psychiatric:         Behavior: Behavior normal.         Thought Content: Thought content normal.         Procedures           ED Course  ED Course as of 04/06/24 0547   Sat Apr 06, 2024    0417 GI is paged waiting call back [AS]      ED Course User Index  [AS] Mauro Camarillo MD                                             Medical Decision Making  Amount and/or Complexity of Data Reviewed  Labs: ordered.  Radiology: ordered.    Risk  Prescription drug management.  Decision regarding hospitalization.      Namita Zabala is a 48 y.o. female with PMH above who presents to the Emergency Department with abdominal pain distention and emesis.  CT ordered to evaluate for obstruction.  Laboratory studies ordered.  Patient appears in pain in the ED, tachycardic, not hypotensive; fluids antiemetics and pain medicine ordered    ED Course:   -I aspirated significant amounts of fluid and air from her G-tube.  This did not seem to improve her pain although her pain can be somewhat difficult to assess due to her limited communicative status.  Trialed Reglan, this did not seem to improve her status.  Called the hospitalist who recommends I speak with GI.  Her GI doctor said that he may need to perform endoscopy, recommends a follow-through series and surgical consult and admission.  I ordered the follow-through series.  I discussed with the hospitalist that surgery would need to be consulted, though this does not appear to be an emergent overnight consult.  Lab studies otherwise unremarkable.  She was admitted to the hospitalist further management      Final diagnosis: gastric outlet obstruction    All questions answered. Patient/family was understanding and in agreement with today's assessment and plan. The patient was monitored during their stay in the ED and dispositioned without acute event.    Electronically signed by:  Mauro Camarillo MD 4/6/2024 05:47 CDT      Note: Dragon medical dictation software was used in the creation of this note.        Final diagnoses:   Gastric outlet obstruction       ED Disposition  ED Disposition       ED Disposition   Decision to Admit    Condition   --     Comment   Level of Care: Med/Surg [1]   Diagnosis: Gastric outlet obstruction [530163]   Admitting Physician: NEVA ELIZONDO [[id number]]   Attending Physician: NEVA ELIZONDO [[id number]]   Certification: I Certify That Inpatient Hospital Services Are Medically Necessary For Greater Than 2 Midnights                 No follow-up provider specified.       Medication List      No changes were made to your prescriptions during this visit.            Mauro Camarillo MD  04/06/24 0532

## 2024-04-06 NOTE — CONSULTS
Patient: Namita Zabala    YOB: 1975    Date: 04/05/2024    Primary Care Provider: David Lara MD    Chief Complaint   Patient presents with    Vomiting       Subjective .     History of present illness:  Ms. Zabala is a 48 y.o. who is here for evaluation of .  Gastric distention.  This is a very sad case her presentation in the bed looks nothing like her picture associated with her name she has had significant decline since this picture was obtained.  She has a history of anoxic brain injury I do not know how it presented but has subsequent effects from this with diffuse contractures, chronic pulmonary embolism history of chronic respiratory failure with tracheostomy in place, history of gastrointestinal hemorrhage she has a G-tube in place, mitral valve prolapse, chronic urinary tract infections, and I am unsure at the time of her DNR status.  By report she has been in a nursing home and was transferred here due to gastric atony and high outputs from her G-tube and on CAT scan she has a large distended stomach.  I do not know whether this is gastric atony or positional from her contractures being mainly on her left side and is not emptying well into the duodenum versus others.  I am consulted due to this gastric distention question gastric outlet obstruction.  We do not know the status of her pylorus and she has not been treated with antiulcer medications and we do not know the status of her H. pylori.  She has had multiple other organisms that have been treated recently with Klebsiella E. coli as to examples of organisms.  General surgery asked to see and evaluate for gastric outlet obstruction.  This is a possibility but probably not 1 that I would suggest operation on I would suggest gastric decompression, endoscopy to evaluate the pylorus ballooning of the pylorus if it is scarred, check and treat H. pylori if present, and if there was a stenosis I would place a MOST tube  through the narrowed area as she does not eat but a MOST tube would do fine for treatment of any potential narrowing.  Absolutely no plans for any surgical intervention on this patient.  Thank you so much for this very kind consultation I hope this assist in her medical care during her hospitalization.    The following portions of the patient's history were reviewed and updated as appropriate: allergies, current medications, past family history, past medical history, past social history, past surgical history and problem list.    Review of Systems    History:  Past Medical History:   Diagnosis Date    Acute kidney failure, unspecified     Angina at rest     Anoxic brain damage, not elsewhere classified     Chronic pulmonary embolism     Chronic respiratory failure, unspecified whether with hypoxia or hypercapnia     Dependence on respirator (ventilator) status     Gastrointestinal hemorrhage, unspecified     Hypercalcemia     Iron deficiency anemia     Metabolic encephalopathy     Migraine     Sees Pain Management for injections.     MVP (mitral valve prolapse)     Other dysphagia     Other pulmonary embolism with acute cor pulmonale     Renal disorder     Ruptured bladder     Syncope     Urinary tract infection, site not specified           Past Surgical History:   Procedure Laterality Date    ABDOMINAL SURGERY      BREAST BIOPSY Right 2011    benign    GTUBE INSERTION      INSERTION HEMODIALYSIS CATHETER Left 09/16/2021    Procedure: HEMODIALYSIS CATHETER INSERTION;  Surgeon: Anders Kaur MD;  Location: [place] [address];  Service: Vascular;  Laterality: Left;    TONSILLECTOMY      TRACHEOSTOMY         Family History   Problem Relation Age of Onset    Colon cancer Maternal Aunt 52    Fibromyalgia Mother     Heart disease Mother     Hypertension Mother     Hodgkin's lymphoma Paternal Grandmother     Ovarian cancer Neg Hx     Breast cancer Neg Hx        Social History     Tobacco Use    Smoking status:  Never    Smokeless tobacco: Never   Vaping Use    Vaping status: Never Used   Substance Use Topics    Alcohol use: No    Drug use: No       Allergies:  Allergies   Allergen Reactions    Coconut Unknown - High Severity    Levaquin [Levofloxacin] Other (See Comments)     unknown    Nuts Unknown - High Severity    Penicillins Rash     Patient's father noted patient had taken penicillin, many years ago, many times and then started developing a rash when she would take it.  She has received cefepime, ceftriaxone and cephalexin with no known adverse problems    [place] Other (See Comments)     Causes migraines per pt reports       Medications:     Current Facility-Administered Medications:     acetaminophen (TYLENOL) tablet 650 mg, 650 mg, Oral, Q4H PRN **OR** acetaminophen (TYLENOL) 160 MG/5ML oral solution 650 mg, 650 mg, Oral, Q4H PRN **OR** acetaminophen (TYLENOL) suppository 650 mg, 650 mg, Rectal, Q4H PRN, Mummaneni, Sundeep, DO    sennosides-docusate (PERICOLACE) 8.6-50 MG per tablet 2 tablet, 2 tablet, Oral, BID PRN **AND** polyethylene glycol (MIRALAX) packet 17 g, 17 g, Oral, Daily PRN **AND** bisacodyl (DULCOLAX) EC tablet 5 mg, 5 mg, Oral, Daily PRN **AND** bisacodyl (DULCOLAX) suppository 10 mg, 10 mg, Rectal, Daily PRN, Mummaneni, Sundeep, DO    Calcium Replacement - Follow Nurse / BPA Driven Protocol, , Does not apply, PRN, Mummaneni, Sundeep, DO    ertapenem (INVanz) 1,000 mg in sodium chloride 0.9 % 100 mL MBP, 1,000 mg, Intravenous, Q24H, Mummaneni, Sundeep, DO    HYDROmorphone (DILAUDID) injection 0.5 mg, 0.5 mg, Intravenous, Once, Mummaneni, Sundeep, DO    lactated ringers infusion, 100 mL/hr, Intravenous, Continuous, Mummaneni, Sundeep, DO, Last Rate: 100 mL/hr at 04/06/24 0723, 100 mL/hr at 04/06/24 0723    Magnesium Low Dose Replacement - Follow Nurse / BPA Driven Protocol, , Does not apply, PRN, Carter Kellogg, DO    Morphine sulfate (PF) injection 1 mg, 1 mg, Intravenous, Q4H PRN **AND**  "naloxone (NARCAN) injection 0.4 mg, 0.4 mg, Intravenous, Q5 Min PRN, Mummaneni, Sundeep, DO    Morphine sulfate (PF) injection 2 mg, 2 mg, Intravenous, Q4H PRN **AND** naloxone (NARCAN) injection 0.4 mg, 0.4 mg, Intravenous, Q5 Min PRN, Mummaneni, Sundeep, DO    nitroglycerin (NITROSTAT) SL tablet 0.4 mg, 0.4 mg, Sublingual, Q5 Min PRN, Mummaneni, Sundeep, DO    ondansetron ODT (ZOFRAN-ODT) disintegrating tablet 4 mg, 4 mg, Oral, Q6H PRN **OR** ondansetron (ZOFRAN) injection 4 mg, 4 mg, Intravenous, Q6H PRN, Mummaneni, Sundeep, DO    pantoprazole (PROTONIX) injection 40 mg, 40 mg, Intravenous, Q12H, Robert Ramos MD    Phosphorus Replacement - Follow Nurse / BPA Driven Protocol, , Does not apply, PRN, Mummaneni, Sundeep, DO    Potassium Replacement - Follow Nurse / BPA Driven Protocol, , Does not apply, PRN, Mummaneni, Sundeep, DO    sodium chloride 0.9 % flush 10 mL, 10 mL, Intravenous, Q12H, Mummaneni, Sundeep, DO    sodium chloride 0.9 % flush 10 mL, 10 mL, Intravenous, PRN, Mummaneni, Sundeep, DO    sodium chloride 0.9 % infusion 40 mL, 40 mL, Intravenous, PRN, Mummaneni, Sundeep, DO    Objective     Vital Signs:   Vitals:    04/06/24 0500 04/06/24 0531 04/06/24 0603 04/06/24 0748   BP: 144/78 144/92 130/74 124/93   BP Location:   Right leg Right leg   Patient Position:   Lying Lying   Pulse: (!) 138 (!) 138 (!) 131 (!) 134   Resp:   18 18   Temp:  98.8 °F (37.1 °C) 99.6 °F (37.6 °C) 100.3 °F (37.9 °C)   TempSrc:  Axillary Axillary Axillary   SpO2: 99% 98% 94% 93%   Weight:   52.8 kg (116 lb 8 oz)    Height:   152.4 cm (60\")        Physical Exam:     General Appearance:  Her eyes are open she does not respond to verbal or tactile stimulation she appears to be comfortable   Head:  She is massively contracted her eyes open does not respond to voice or ambulation.  Trach is in place, no infection,           Throat:   No oral lesions, no thrush, oral mucosa moist       Lungs:     Clear to auscultation,respirations " regular, even and                  Unlabored    Heart:    Regular rhythm and normal rate, no murmur, no gallop.   Chest Wall:    No abnormalities observed   Abdomen:   Abdomen is contracted, she is laying on her left side, the G-tube is in place, there is no surrounding infection there is no spillage around the G-tube there is no excoriation, she has a soft doughy abdomen, she has a very sizable hernia to the left of her rectus and also her hernia at the umbilicus.  Both reducible significant anatomical abnormalities due to her contorted position   Extremities: Significant contractures noted no obvious breakdown.  The back was not evaluated.   Pulses:   Pulses palpable and equal bilaterally   Skin:   No bleeding, bruising or rash   Lymph nodes:   No palpable adenopathy   Neurologic:   Cranial nerves 2 - 12 grossly intact.         Results Review:   I reviewed the patient's new clinical results.    Review of systems could not be obtained    BMI is within normal parameters. No other follow-up for BMI required.    Assessment / Plan:    Diagnoses and all orders for this visit:    1. Gastric outlet obstruction (Primary)    Other orders  -     CBC & Differential; Standing  -     Comprehensive Metabolic Panel; Standing  -     Magnesium; Standing  -     Lipase; Standing  -     Lactic Acid, Plasma; Standing  -     CT Abdomen Pelvis With Contrast; Standing  -     hCG, Serum, Qualitative; Standing  -     CBC & Differential  -     Comprehensive Metabolic Panel  -     Magnesium  -     Lipase  -     Lactic Acid, Plasma  -     CT Abdomen Pelvis With Contrast  -     hCG, Serum, Qualitative  -     lactated ringers bolus 1,000 mL  -     fentaNYL citrate (PF) (SUBLIMAZE) injection 50 mcg  -     ondansetron (ZOFRAN) injection 4 mg  -     iopamidol (ISOVUE-300) 61 % injection 100 mL  -     Morphine sulfate (PF) injection 2 mg  -     metoclopramide (REGLAN) injection 10 mg  -     FL Small Bowel Follow Through Single-Contrast; Standing  -      FL Small Bowel Follow Through Single-Contrast  -     Inpatient Gastroenterology Consult; Standing  -     Inpatient Gastroenterology Consult  -     Morphine sulfate (PF) injection 2 mg  -     Inpatient Admission; Standing  -     Inpatient Admission  -     ertapenem (INVanz) 1,000 mg in sodium chloride 0.9 % 100 mL MBP  -     Vital Signs; Standing  -     Intake & Output; Standing  -     Weigh Patient; Standing  -     Oral Care; Standing  -     Insert Peripheral IV; Standing  -     Cancel: Saline Lock & Maintain IV Access; Standing  -     sodium chloride 0.9 % flush 10 mL  -     sodium chloride 0.9 % flush 10 mL  -     sodium chloride 0.9 % infusion 40 mL  -     Code Status and Medical Interventions:; Standing  -     Place Sequential Compression Device; Standing  -     Maintain Sequential Compression Device; Standing  -     Continuous Cardiac Monitoring; Standing  -     nitroglycerin (NITROSTAT) SL tablet 0.4 mg  -     Maintain IV Access; Standing  -     Telemetry - Place Orders & Notify Provider of Results When Patient Experiences Acute Chest Pain, Dysrhythmia or Respiratory Distress; Standing  -     May Be Off Telemetry for Tests; Standing  -     Activity - Ad Myra; Standing  -     Oxygen Therapy- Nasal Cannula; Titrate 1-6 LPM Per SpO2; 90 - 95%; Standing  -     NPO Diet NPO Type: Strict NPO; Standing  -     Inpatient General Surgery Consult; Standing  -     CBC Auto Differential; Standing  -     Comprehensive Metabolic Panel; Standing  -     Magnesium; Standing  -     Phosphorus; Standing  -     lactated ringers infusion  -     Potassium Replacement - Follow Nurse / BPA Driven Protocol  -     Magnesium Low Dose Replacement - Follow Nurse / BPA Driven Protocol  -     Phosphorus Replacement - Follow Nurse / BPA Driven Protocol  -     Calcium Replacement - Follow Nurse / BPA Driven Protocol  -     acetaminophen (TYLENOL) tablet 650 mg  -     acetaminophen (TYLENOL) 160 MG/5ML oral solution 650 mg  -      acetaminophen (TYLENOL) suppository 650 mg  -     Morphine sulfate (PF) injection 1 mg  -     naloxone (NARCAN) injection 0.4 mg  -     Morphine sulfate (PF) injection 2 mg  -     naloxone (NARCAN) injection 0.4 mg  -     sennosides-docusate (PERICOLACE) 8.6-50 MG per tablet 2 tablet  -     polyethylene glycol (MIRALAX) packet 17 g  -     bisacodyl (DULCOLAX) EC tablet 5 mg  -     bisacodyl (DULCOLAX) suppository 10 mg  -     ondansetron ODT (ZOFRAN-ODT) disintegrating tablet 4 mg  -     ondansetron (ZOFRAN) injection 4 mg  -     Code Status and Medical Interventions:  -     Transfer Patient; Standing  -     Transfer Patient  -     lactated ringers bolus 1,000 mL  -     HYDROmorphone (DILAUDID) injection 0.5 mg  -     Vital Signs  -     Vital Signs  -     Vital Signs  -     Vital Signs  -     Vital Signs  -     Intake & Output  -     Intake & Output  -     Weigh Patient  -     Oral Care  -     Oral Care  -     Insert Peripheral IV  -     Cancel: Saline Lock & Maintain IV Access  -     Place Sequential Compression Device  -     Maintain Sequential Compression Device  -     Continuous Cardiac Monitoring  -     Maintain IV Access  -     Telemetry - Place Orders & Notify Provider of Results When Patient Experiences Acute Chest Pain, Dysrhythmia or Respiratory Distress  -     May Be Off Telemetry for Tests  -     Activity - Ad Myra  -     NPO Diet NPO Type: Strict NPO  -     Inpatient General Surgery Consult  -     CBC Auto Differential  -     Comprehensive Metabolic Panel  -     Magnesium  -     Phosphorus  -     Initiate & Follow Hypercapnic Monitoring Guideline for Opioid Administration via EtCO2 and / or SpO2; Standing  -     Opioid Administration - Document EtCO2 and / or SpO2 With Each Set of Vitals & Any Change in Patient Status; Standing  -     Opioid Administration - Notify Provider Hypercapnic Monitoring; Standing  -     Opioid Administration - Continuous Pulse Oximetry (SpO2); Standing  -     Initiate & Follow  "Hypercapnic Monitoring Guideline for Opioid Administration via EtCO2 and / or SpO2  -     Opioid Administration - Document EtCO2 and / or SpO2 With Each Set of Vitals & Any Change in Patient Status  -     Opioid Administration - Notify Provider Hypercapnic Monitoring  -     Opioid Administration - Continuous Pulse Oximetry (SpO2)  -     pantoprazole (PROTONIX) injection 40 mg  -     Protime-INR; Standing  -     aPTT; Standing  -     Protime-INR  -     aPTT  -     Please ask GI medicine to complete endoscopy, and also to replace the gastrostomy tube to a \"TORIBIO tube\" with a tube that goes into the duodenum and also a tube that stays in the stomach for sucking.  Thank you  Nursing Communication; Standing  -     Please ask GI medicine to complete endoscopy, and also to replace the gastrostomy tube to a \"TORIBIO tube\" with a tube that goes into the duodenum and also a tube that stays in the stomach for sucking.  Thank you  Nursing Communication      Patient with anoxic brain injury with quadriplegia, causes unknown.  I do not know her DNR status, longstanding G-tube in place with gastric distention at present.  As noted above would have gastroenterology see and evaluate consider endoscopy and evaluation of the pylorus, if the pylorus is narrowed would suggest a biopsy of this area, and dilatation, potentially placement under direct visualization of a MOST tube that would go into the duodenum to further feed her if the stomach is occluded.  Given her situation I think a MOST tube through the pylorus would be my suggestion if there was a obstruction will check H. pylori and treat her for peptic ulcer disease absolutely no plan for any surgical intervention if surgery is needed would refer her to a tertiary center thank you for this consultation no other suggestions and will sign off.    [] X-Ray  [] Reviewed Records  [] Called Physician/Consulted    Electronically signed by Don Woodward MD  04/06/24  08:19 " CDT

## 2024-04-06 NOTE — H&P
[facility] Medicine Services  HISTORY AND PHYSICAL    Date of Admission: 4/5/2024  Primary Care Physician: David Lara MD    Subjective   Primary Historian: Chart review    Chief Complaint: Abdominal discomfort apparent from nursing home    History of Present Illness  Patient was sent from nursing home due to significant abdominal discomfort.  Found to be tachycardic and in acute discomfort on arrival to the ED and given multiple doses of pain medications as well as evaluation of gastric tube and further imaging was done.  A gastric outlet obstruction was found and discussion was had with the [facility]'s gastroenterologist on site by the ER provider and they suggested admission for possible endoscopy and treatment.  At this time we are asked to admit and start patient on IV fluids and monitoring.        Review of Systems       Past Medical History:   Past Medical History:   Diagnosis Date    Acute kidney failure, unspecified     Angina at rest     Anoxic brain damage, not elsewhere classified     Chronic pulmonary embolism     Chronic respiratory failure, unspecified whether with hypoxia or hypercapnia     Dependence on respirator (ventilator) status     Gastrointestinal hemorrhage, unspecified     Hypercalcemia     Iron deficiency anemia     Metabolic encephalopathy     Migraine     Sees Pain Management for injections.     MVP (mitral valve prolapse)     Other dysphagia     Other pulmonary embolism with acute cor pulmonale     Renal disorder     Ruptured bladder     Syncope     Urinary tract infection, site not specified      Past Surgical History:  Past Surgical History:   Procedure Laterality Date    ABDOMINAL SURGERY      BREAST BIOPSY Right 2011    benign    GTUBE INSERTION      INSERTION HEMODIALYSIS CATHETER Left 09/16/2021    Procedure: HEMODIALYSIS CATHETER INSERTION;  Surgeon: Anders Kaur MD;  Location: [place] [id number];  Service: Vascular;   Laterality: Left;    TONSILLECTOMY      TRACHEOSTOMY       Social History:  reports that she has never smoked. She has never used smokeless tobacco. She reports that she does not drink alcohol and does not use drugs.    Family History: family history includes Colon cancer (age of onset: 52) in her maternal aunt; Fibromyalgia in her mother; Heart disease in her mother; Hodgkin's lymphoma in her paternal grandmother; Hypertension in her mother.       Allergies:  Allergies   Allergen Reactions    Coconut Unknown - High Severity    Levaquin [Levofloxacin] Other (See Comments)     unknown    Nuts Unknown - High Severity    Penicillins Rash     Patient's father noted patient had taken penicillin, many years ago, many times and then started developing a rash when she would take it.  She has received cefepime, ceftriaxone and cephalexin with no known adverse problems    Tur[place] Other (See Comments)     Causes migraines per pt reports       Medications:  Prior to Admission medications    Medication Sig Start Date End Date Taking? Authorizing Provider   acetaminophen (TYLENOL) 500 MG tablet Administer 1 tablet per G tube Every 4 (Four) Hours As Needed for Fever. Not to exceed 3000mg from all sources daily    Odalys Mullen MD   bisacodyl (DULCOLAX) 10 MG suppository Insert 1 suppository into the rectum Every Other Day As Needed for Constipation.    Odalys Mullen MD   cetirizine (zyrTEC) 10 MG tablet Take 1 tablet by mouth Daily.    Odalys Mullen MD   chlorhexidine (PERIDEX) 0.12 % solution Apply 15 mL to the mouth or throat 2 (Two) Times a Day. 15 ml using oral swab twice daily for oral care    Odalys Mullen MD   Citric Im-Mxkmvkykvab-Wr Carb (Renacidin) solution Irrigate with 60 mL to the affected area as directed by provider 2 (Two) Times a Day. Irrigate stratton catheter    Odalys Mullen MD   ertapenem 1,000 mg in sodium chloride 0.9 % 100 mL IVPB Infuse 1,000 mg into a venous catheter  Daily for 4 doses. Indications: Urinary Tract Infection 4/2/24 4/6/24  David Hernández MD   fluticasone (FLONASE) 50 MCG/ACT nasal spray 2 sprays into the nostril(s) as directed by provider Daily.    Odalys Mullen MD   guaifenesin (ROBITUSSIN) 100 MG/5ML liquid Administer 30 mL per G tube 2 (Two) Times a Day.    Odalys Mullen MD   hydrOXYzine (ATARAX) 25 MG tablet Administer 1 tablet per G tube Every 6 (Six) Hours As Needed for Itching. 12/4/23   Amado Villarreal MD   liver oil-zinc oxide (DESITIN) 40 % ointment Apply 1 application  topically to the appropriate area as directed Every 4 (Four) Hours As Needed for Irritation.  Patient taking differently: Apply 1 Application topically to the appropriate area as directed Every 4 (Four) Hours As Needed for Irritation. To perirectal and groin area after any stool incontinence 12/4/23   Amado Villarreal MD   miconazole (MICOTIN) 2 % powder Apply 1 Application topically to the appropriate area as directed 2 (Two) Times a Day As Needed for Itching. Apply to groin or gluteal folds for rash, itching, redness and/or irritation    Odalys Mullen MD   nystatin (MYCOSTATIN) 355061 UNIT/GM powder Apply 1 Application topically to the appropriate area as directed 2 (Two) Times a Day. To bilateral bendarms, left abdominal fold, and under left breast    Odalys Mullen MD   ondansetron (ZOFRAN) 4 MG/5ML solution Take 5 mL by mouth Every 6 (Six) Hours As Needed for Nausea or Vomiting.    Odalys Mullen MD   Scopolamine 1 MG/3DAYS patch Place 1 patch on the skin as directed by provider Every 72 (Seventy-Two) Hours.    Odalys Mullen MD   simethicone (MYLICON) 40 MG/0.6ML drops Administer 0.3 mL per G tube Every 6 (Six) Hours.    Odalys Mullen MD   sodium chloride (NS) 0.9 % irrigation Irrigate with 10 mL to the affected area as directed by provider Every 8 (Eight) Hours. Irrigation for secretions: Hydration    Provider  "MD Odalys   Sodium Phosphates (fleet enema) 7-19 GM/118ML enema Insert 1 enema into the rectum Every Other Day As Needed (bowel management).    Provider, MD Odalys     I have utilized all available immediate resources to obtain, update, or review the patient's current medications (including all prescriptions, over-the-counter products, herbals, cannabis/cannabidiol products, and vitamin/mineral/dietary (nutritional) supplements).    Objective     Vital Signs: /97   Pulse (!) 134   Temp 98.9 °F (37.2 °C) (Axillary)   Resp 16   Ht 152.4 cm (60\")   Wt 61.9 kg (136 lb 6.4 oz)   LMP  (LMP Unknown)   SpO2 95%   BMI 26.64 kg/m²   Physical Exam   Physical Exam   Constitutional:       Appearance: Chronically ill-appearing.  Multiple contractures.  HENT:      Head: Normocephalic and atraumatic.      Nose: Nose normal.      Mouth/Throat:      Mouth: Mucous membranes are dry.     Pharynx: unable to evaluate  Eyes:      Extraocular Movements: Extraocular movements preserved     Conjunctiva/sclera: Conjunctivae normal.      Pupils: Pupils are equal, round, and reactive to light.   Cardiovascular:      Rate and Rhythm: normal rate and regular rhythm.   Pulmonary:      Effort: No tachypnea.     Breath sounds: Reduced breath sounds on the right due to positioning.  Abdominal:      General: Abdominal scar extensive, with no open wounds there is a percutaneous gastrostomy tube in place.  Abdomen is nondistended.  Bowel sounds are diminished     Palpations: Abdomen is soft.      Tenderness: Mild tenderness present  Musculoskeletal:         General: Multiple upper and lower extremity flexion contractures; decreased range of motion.    Extremities: Multiple contractures as per musculoskeletal exam; no lower extremity edema.  Skin:     Capillary Refill: Capillary refill takes less than 2 seconds.      Coloration: Skin is not jaundiced.      Findings: No rash.   Neurological:      General: Spastic quadriplegia.  I " am not able to assess patient's orientation or memory.  Speech:  with aphasia.   Psychiatric evaluation not able to be performed.      Results Reviewed:  Lab Results (last 24 hours)       Procedure Component Value Units Date/Time    Comprehensive Metabolic Panel [[id number]]  (Abnormal) Collected: 04/05/24 2310    Specimen: Blood from Arm, Left Updated: 04/05/24 2338     Glucose 102 mg/dL      BUN 18 mg/dL      Creatinine 0.40 mg/dL      Sodium 140 mmol/L      Potassium 4.5 mmol/L      Chloride 102 mmol/L      CO2 23.0 mmol/L      Calcium 10.1 mg/dL      Total Protein 7.7 g/dL      Albumin 4.4 g/dL      ALT (SGPT) 18 U/L      AST (SGOT) 16 U/L      Alkaline Phosphatase 86 U/L      Total Bilirubin 0.3 mg/dL      Globulin 3.3 gm/dL      A/G Ratio 1.3 g/dL      BUN/Creatinine Ratio 45.0     Anion Gap 15.0 mmol/L      eGFR 123.8 mL/min/1.73     Narrative:      GFR Normal >60  Chronic Kidney Disease <60  Kidney Failure <15      Magnesium [[id number]]  (Normal) Collected: 04/05/24 2310    Specimen: Blood from Arm, Left Updated: 04/05/24 2338     Magnesium 2.1 mg/dL     Lipase [[id number]]  (Normal) Collected: 04/05/24 2310    Specimen: Blood from Arm, Left Updated: 04/05/24 2338     Lipase 36 U/L     Lactic Acid, Plasma [[id number]]  (Normal) Collected: 04/05/24 2310    Specimen: Blood from Arm, Left Updated: 04/05/24 2337     Lactate 0.9 mmol/L     hCG, Serum, Qualitative [[id number]]  (Normal) Collected: 04/05/24 2310    Specimen: Blood from Arm, Left Updated: 04/05/24 2328     HCG Qualitative Negative    CBC & Differential [[id number]]  (Abnormal) Collected: 04/05/24 2310    Specimen: Blood from Arm, Left Updated: 04/05/24 2318    Narrative:      The following orders were created for panel order CBC & Differential.  Procedure                               Abnormality         Status                     ---------                               -----------         ------                     CBC Auto Differential[[id number]]         Abnormal            Final result                 Please view results for these tests on the individual orders.    CBC Auto Differential [[id number]]  (Abnormal) Collected: 04/05/24 2310    Specimen: Blood from Arm, Left Updated: 04/05/24 2318     WBC 9.94 10*3/mm3      RBC 3.77 10*6/mm3      Hemoglobin 11.6 g/dL      Hematocrit 34.3 %      MCV 91.0 fL      MCH 30.8 pg      MCHC 33.8 g/dL      RDW 13.3 %      RDW-SD 44.0 fl      MPV 9.6 fL      Platelets 304 10*3/mm3      Neutrophil % 69.5 %      Lymphocyte % 17.9 %      Monocyte % 7.9 %      Eosinophil % 4.0 %      Basophil % 0.3 %      Immature Grans % 0.4 %      Neutrophils, Absolute 6.90 10*3/mm3      Lymphocytes, Absolute 1.78 10*3/mm3      Monocytes, Absolute 0.79 10*3/mm3      Eosinophils, Absolute 0.40 10*3/mm3      Basophils, Absolute 0.03 10*3/mm3      Immature Grans, Absolute 0.04 10*3/mm3      nRBC 0.0 /100 WBC           Imaging Results (Last 24 Hours)       Procedure Component Value Units Date/Time    CT Abdomen Pelvis With Contrast [[id number]] Collected: 04/06/24 0212     Updated: 04/06/24 0300    Narrative:      CT ABDOMEN PELVIS W CONTRAST- 4/5/2024 10:35 PM     HISTORY: distention and vomiting       COMPARISON: 3/8/2024.     DLP: 1372.14 mGy.cm. All CT scans are performed using dose optimization  techniques as appropriate to the performed exam and including at least  one of the following: Automated exposure control, adjustment of the mA  and/or kV according to size, and the use of the iterative reconstruction  technique.     TECHNIQUE: Following the intravenous administration of contrast, helical  CT tomographic images of the abdomen and pelvis were acquired. Coronal  reformatted images were also provided for review.     FINDINGS:  Bibasilar atelectasis is present. The base of the heart is unremarkable.  There is no pericardial effusion..     LIVER: No focal liver lesion. The hepatic vasculature is patent.     BILIARY SYSTEM: The gallbladder is  unremarkable. No intrahepatic or  extrahepatic ductal dilatation.     PANCREAS: No focal pancreatic lesion.     SPLEEN: Unremarkable.     KIDNEYS AND ADRENALS: A left-sided percutaneous nephrostomy tube as well  as intraureteral stent remain in place. The pigtail catheter of the  nephrostomy tube remains well-positioned within the left renal pelvis.  Left-sided nephrolithiasis is present. There is moderate distention of  the upper tracts of the left kidney stable from the previous exam. No  perinephric fluid collection is present. There is urothelial thickening  of the upper tracts of the left kidney and left ureter with the stent  well-positioned. The distal aspect of the left pigtail catheter is in  the urinary bladder. Right-sided nephrolithiasis is present including a  larger stone measuring 1.4 cm in size in the right renal pelvis. This is  stable. There is a stable distention of the upper tracts of the right  kidney. The more distal right ureter is decompressed and normal in  appearance with no additional ureteral calculus identified.. No  developing perinephric fluid collections..     RETROPERITONEUM: No mass, lymphadenopathy or hemorrhage.     GI TRACT: A gastrostomy tube is in place within the stomach. There is  moderate to severe distention of the gastric lumen. There is a  transition at the gastric outlet with the descending and transverse  duodenum being normal in caliber. I do not see any adjacent free air or  focal bowel wall thickening. As noted previously there is diastases of  the abdominal musculature ventrally at multiple levels with associated  areas of abdominal wall hernia. Several loops of small bowel and colon  are rather adherent to the anterior abdominal wall with protrusion of  segments of colon through abdominal wall defects at several levels  including in the region of the umbilicus and left lower quadrant. The  degree of distention of these protuberant segments of colon I feel  are  similar to the previous examination. There has been evacuation of the  large volume of fecal material which was noted throughout the colon  since the previous exam. Air-fluid levels are noted in some of the colon  loops status post evacuation of the fecal material. No pneumatosis or  focal bowel wall thickening appreciated.        OTHER: There is no mesenteric mass, lymphadenopathy or fluid collection.  The abdominopelvic vasculature is patent. There is stranding in the  subcutaneous tissues of the anterior abdominal wall on the right likely  related to subcutaneous injection. No localized fluid collection or mass  is demonstrated within the soft tissues. There is chronic deformity of  the bony pelvis similar to the previous examination. Previously noted  coarsened appearance of the left femoral shaft shows interval  improvement. A sclerotic lesion in the greater trochanter of the left  hip is stable from the previous exam. No definite bone destruction or  occult fracture.. No free fluid is present in the abdomen or pelvis.     PELVIS: A Mojica catheter is in place within the bladder. The patient's  left-sided nephroureteral stent is in place with the distal pigtail  catheter in the bladder. No free fluid in the pelvis.. The urinary  bladder is decompressed with a Mojica catheter in place..       Impression:      1. When compared to the previous exam of 3/20/2024 there is moderate to  severe distention of the stomach with an air-fluid level. There is no  gastric wall thickening. There is an apparent transition near the  gastric outlet with the descending duodenum and transverse duodenum  normal in caliber. I do not see any convincing evidence of volvulus.  There is no adjacent inflammatory changes or mass appreciated. A  gastrostomy tube is in place. FINDINGS worrisome for a gastric outlet  obstruction or at least stenosis.  2. When compared to the previous exam the large volume of fecal material  which was noted  throughout the colon has been evacuated. Several of the  previously noted distended loops of colon which were previously  stool-filled are now fluid-filled with scattered colonic air-fluid  levels. As noted previously the small bowel is normal in caliber with  only a few loops of bowel in the left lower quadrant which are mildly  distended. I feel the small bowel is similar in distribution and  appearance to the previous exam of 3/8/2024. The patient does  demonstrate multiple areas of diastases of the abdominal musculature as  well as adjacent hernias. There is protrusion of portions of the colon  into these areas of diastases and abdominal wall defect and specifically  in the region of the umbilicus and left lower quadrant.. These are  similar in distribution and size to the previous exam. There is no free  fluid or localized inflammation appreciated in the abdomen or pelvis.  3. A left-sided nephroureteral stent remains in place. There is  urothelial thickening and mild dilatation of the upper tracts of the  left kidney stable from the previous exam. The left-sided ureteral stent  remains well-positioned. There is a Mojica catheter in place within the  bladder with decompression of the bladder. Right-sided nephrolithiasis  is also present with urothelial thickening. There is a larger stone in  the right renal pelvis unchanged from the previous exam. Below the level  of the right UPJ the right ureter is decompressed and normal in  appearance.  4. Bony deformity of the pelvis. This is also stable from the previous  exam. There is a stable sclerotic lesion in the greater trochanter of  the left hip perhaps representing a bone island. Permeative appearance  of the left femur shows interval improvement. No occult fracture.  5. Bibasilar atelectasis.        This report was signed and finalized on 4/6/2024 2:57 AM by Dr. Yuniel De Jesus MD.             I have personally reviewed and interpreted the radiology studies  and ECG obtained at time of admission.     Assessment / Plan   Assessment:   Active Hospital Problems    Diagnosis     **Gastric outlet obstruction        Treatment Plan  The patient will be admitted to my service here at [facility].     Medical Decision Making  Number and Complexity of problems: High  Differential Diagnosis:   # Gastric outlet obstruction  - Admit to inpatient  - Remote telemetry  - Pain control as tolerated  - LR at 100 cc/h will give another bolus now  - Small bowel follow-through  - GI consult, appreciate recommendations and assistance in care  - Continue n.p.o.  - General surgery consultation in the morning was suggested by GI, will place for morning consultation    Restart home medications for stable chronic medical concerns once GJ tube is working and gastric outlet obstruction is fixed.    # Sinus tachycardia  - Likely secondary to a combination of pain and dehydration will give IV fluids and pain control as tolerated    Patient has been on Invanz for significant UTI with pyelonephritis will continue this for now    Conditions and Status        Condition is unchanged.     MDM Data  External documents reviewed: None  Cardiac tracing (EKG, telemetry) interpretation: Sinus tachycardia  Radiology interpretation: As above  Labs reviewed: Yes  Any tests that were considered but not ordered: No     Decision rules/scores evaluated (example [id number], [place], etc): N/A     Discussed with: ER provider     Care Planning  Shared decision making: ER provider  Code status and discussions: DNR/DNI based off of history    Disposition  Social Determinants of Health that impact treatment or disposition: Chronically ill with very poor quality of life and low chance of recovery  Estimated length of stay is 2+ days.     I confirmed that the patient's advanced care plan is present, code status is documented, and a surrogate decision maker is listed in the patient's medical record.     The patient's  surrogate decision maker is .     The patient was seen and examined by me on 4/6/2024 at 0440.    Electronically signed by Carter Kellogg DO, 04/06/24, 04:40 CDT.

## 2024-04-06 NOTE — PLAN OF CARE
Goal Outcome Evaluation:  Plan of Care Reviewed With: patient        Progress: no change  Outcome Evaluation: PT screen completed. Per chart review, pt at baseline function and does not meet criteria for skilled PT. PT sign off.

## 2024-04-06 NOTE — PROGRESS NOTES
[facility] Medicine Services  INPATIENT PROGRESS NOTE    Patient Name: Namita Zabala  Date of Admission: 4/5/2024  Today's Date: 04/06/24  Length of Stay: 0  Primary Care Physician: David Lara MD    Subjective   Chief Complaint: Abdominal pain  HPI   Time of examination patient is resting quietly in bed.  Patient is nonverbal.  Marked contractures over entirety of body.        Review of Systems   All pertinent negatives and positives are as above. All other systems have been reviewed and are negative unless otherwise stated.     Objective    Temp:  [98.8 °F (37.1 °C)-100.8 °F (38.2 °C)] 100.8 °F (38.2 °C)  Heart Rate:  [104-138] 136  Resp:  [16-18] 18  BP: (124-145)/(61-98) 145/76  Physical Exam  Vitals and nursing note reviewed.   Constitutional:       Comments: Currently resting quietly.   HENT:      Head: Normocephalic.      Right Ear: External ear normal.      Left Ear: External ear normal.   Eyes:      Pupils: Pupils are equal, round, and reactive to light.   Neck:      Comments: Contractured  Cardiovascular:      Rate and Rhythm: Normal rate and regular rhythm.      Pulses: Normal pulses.      Heart sounds: Normal heart sounds.   Pulmonary:      Effort: Pulmonary effort is normal.      Breath sounds: Normal breath sounds.   Abdominal:      General: Bowel sounds are normal. There is no distension.   Musculoskeletal:      Comments: Contractured   Skin:     General: Skin is warm and dry.      Capillary Refill: Capillary refill takes less than 2 seconds.   Neurological:      Mental Status: Mental status is at baseline.             Results Review:  I have reviewed the labs, radiology results, and diagnostic studies.    Laboratory Data:   Results from last 7 days   Lab Units 04/06/24  0809 04/05/24  2310 04/02/24  0345   WBC 10*3/mm3 9.72 9.94 7.25   HEMOGLOBIN g/dL 11.0* 11.6* 10.8*   HEMATOCRIT % 34.1 34.3 32.2*   PLATELETS 10*3/mm3 280 304 234        Results  "from last 7 days   Lab Units 04/06/24  0809 04/05/24  2310 04/02/24  1441 04/02/24  0345   SODIUM mmol/L 140 140  --  139   POTASSIUM mmol/L 4.3 4.5 5.0 3.6   CHLORIDE mmol/L 104 102  --  103   CO2 mmol/L 24.0 23.0  --  25.0   BUN mg/dL 17 18  --  10   CREATININE mg/dL 0.40* 0.40*  --  0.30*   CALCIUM mg/dL 10.0 10.1  --  9.8   BILIRUBIN mg/dL 0.5 0.3  --   --    ALK PHOS U/L 86 86  --   --    ALT (SGPT) U/L 17 18  --   --    AST (SGOT) U/L 16 16  --   --    GLUCOSE mg/dL 111* 102*  --  104*       Culture Data:   No results found for: \"BLOODCX\", \"URINECX\", \"WOUNDCX\", \"MRSACX\", \"RESPCX\", \"STOOLCX\"    Radiology Data:   Imaging Results (Last 24 Hours)       Procedure Component Value Units Date/Time    XR Abdomen KUB [[id number]] Collected: 04/06/24 1047     Updated: 04/06/24 1056    Narrative:      XR ABDOMEN KUB- 4/6/2024 8:53 AM     HISTORY: workup for gastric outlet obstruction; K31.1-Adult hypertrophic  pyloric stenosis       TECHNIQUE:Single AP view of the abdomen.     COMPARISON: CT abdomen pelvis 4/6/2024     FINDINGS:     Multiple gas-filled distended loops of bowel are noted throughout the  abdomen. This is favored to represent large bowel. This is probably  similar compared to the prior CT.     Left percutaneous nephroureteral catheter is noted. Mojica catheter  overlies pelvis. Gastric tube is noted overlying the region of the  stomach.     No visualized free air.     No significant stool burden.     No acute osseous abnormality.       Impression:         Gas-filled distended loops of bowel diffusely throughout the abdomen is  favored to represent large bowel. This is probably similar compared to  the prior CT.      Gas distention of the stomach is poorly evaluated as compared to the  prior CT.     Left percutaneous nephroureteral catheter.     Gastric tube bubble appears to overlie the stomach.     Mojica catheter is noted.                 This report was signed and finalized on 4/6/2024 10:53 AM by " Amilcar Mckenna.       CT Abdomen Pelvis With Contrast [[id number]] Collected: 04/06/24 0212     Updated: 04/06/24 0300    Narrative:      CT ABDOMEN PELVIS W CONTRAST- 4/5/2024 10:35 PM     HISTORY: distention and vomiting       COMPARISON: 3/8/2024.     DLP: 1372.14 mGy.cm. All CT scans are performed using dose optimization  techniques as appropriate to the performed exam and including at least  one of the following: Automated exposure control, adjustment of the mA  and/or kV according to size, and the use of the iterative reconstruction  technique.     TECHNIQUE: Following the intravenous administration of contrast, helical  CT tomographic images of the abdomen and pelvis were acquired. Coronal  reformatted images were also provided for review.     FINDINGS:  Bibasilar atelectasis is present. The base of the heart is unremarkable.  There is no pericardial effusion..     LIVER: No focal liver lesion. The hepatic vasculature is patent.     BILIARY SYSTEM: The gallbladder is unremarkable. No intrahepatic or  extrahepatic ductal dilatation.     PANCREAS: No focal pancreatic lesion.     SPLEEN: Unremarkable.     KIDNEYS AND ADRENALS: A left-sided percutaneous nephrostomy tube as well  as intraureteral stent remain in place. The pigtail catheter of the  nephrostomy tube remains well-positioned within the left renal pelvis.  Left-sided nephrolithiasis is present. There is moderate distention of  the upper tracts of the left kidney stable from the previous exam. No  perinephric fluid collection is present. There is urothelial thickening  of the upper tracts of the left kidney and left ureter with the stent  well-positioned. The distal aspect of the left pigtail catheter is in  the urinary bladder. Right-sided nephrolithiasis is present including a  larger stone measuring 1.4 cm in size in the right renal pelvis. This is  stable. There is a stable distention of the upper tracts of the right  kidney. The more distal right  ureter is decompressed and normal in  appearance with no additional ureteral calculus identified.. No  developing perinephric fluid collections..     RETROPERITONEUM: No mass, lymphadenopathy or hemorrhage.     GI TRACT: A gastrostomy tube is in place within the stomach. There is  moderate to severe distention of the gastric lumen. There is a  transition at the gastric outlet with the descending and transverse  duodenum being normal in caliber. I do not see any adjacent free air or  focal bowel wall thickening. As noted previously there is diastases of  the abdominal musculature ventrally at multiple levels with associated  areas of abdominal wall hernia. Several loops of small bowel and colon  are rather adherent to the anterior abdominal wall with protrusion of  segments of colon through abdominal wall defects at several levels  including in the region of the umbilicus and left lower quadrant. The  degree of distention of these protuberant segments of colon I feel are  similar to the previous examination. There has been evacuation of the  large volume of fecal material which was noted throughout the colon  since the previous exam. Air-fluid levels are noted in some of the colon  loops status post evacuation of the fecal material. No pneumatosis or  focal bowel wall thickening appreciated.        OTHER: There is no mesenteric mass, lymphadenopathy or fluid collection.  The abdominopelvic vasculature is patent. There is stranding in the  subcutaneous tissues of the anterior abdominal wall on the right likely  related to subcutaneous injection. No localized fluid collection or mass  is demonstrated within the soft tissues. There is chronic deformity of  the bony pelvis similar to the previous examination. Previously noted  coarsened appearance of the left femoral shaft shows interval  improvement. A sclerotic lesion in the greater trochanter of the left  hip is stable from the previous exam. No definite bone  destruction or  occult fracture.. No free fluid is present in the abdomen or pelvis.     PELVIS: A Mojica catheter is in place within the bladder. The patient's  left-sided nephroureteral stent is in place with the distal pigtail  catheter in the bladder. No free fluid in the pelvis.. The urinary  bladder is decompressed with a Mojica catheter in place..       Impression:      1. When compared to the previous exam of 3/20/2024 there is moderate to  severe distention of the stomach with an air-fluid level. There is no  gastric wall thickening. There is an apparent transition near the  gastric outlet with the descending duodenum and transverse duodenum  normal in caliber. I do not see any convincing evidence of volvulus.  There is no adjacent inflammatory changes or mass appreciated. A  gastrostomy tube is in place. FINDINGS worrisome for a gastric outlet  obstruction or at least stenosis.  2. When compared to the previous exam the large volume of fecal material  which was noted throughout the colon has been evacuated. Several of the  previously noted distended loops of colon which were previously  stool-filled are now fluid-filled with scattered colonic air-fluid  levels. As noted previously the small bowel is normal in caliber with  only a few loops of bowel in the left lower quadrant which are mildly  distended. I feel the small bowel is similar in distribution and  appearance to the previous exam of 3/8/2024. The patient does  demonstrate multiple areas of diastases of the abdominal musculature as  well as adjacent hernias. There is protrusion of portions of the colon  into these areas of diastases and abdominal wall defect and specifically  in the region of the umbilicus and left lower quadrant.. These are  similar in distribution and size to the previous exam. There is no free  fluid or localized inflammation appreciated in the abdomen or pelvis.  3. A left-sided nephroureteral stent remains in place. There  is  urothelial thickening and mild dilatation of the upper tracts of the  left kidney stable from the previous exam. The left-sided ureteral stent  remains well-positioned. There is a Mojica catheter in place within the  bladder with decompression of the bladder. Right-sided nephrolithiasis  is also present with urothelial thickening. There is a larger stone in  the right renal pelvis unchanged from the previous exam. Below the level  of the right UPJ the right ureter is decompressed and normal in  appearance.  4. Bony deformity of the pelvis. This is also stable from the previous  exam. There is a stable sclerotic lesion in the greater trochanter of  the left hip perhaps representing a bone island. Permeative appearance  of the left femur shows interval improvement. No occult fracture.  5. Bibasilar atelectasis.        This report was signed and finalized on 4/6/2024 2:57 AM by Dr. Yuniel De Jesus MD.               I have reviewed the patient's current medications.     Assessment/Plan   Assessment  Active Hospital Problems    Diagnosis     **Gastric outlet obstruction     Anoxic brain injury     Functional quadriplegia     Tracheostomy present        Treatment Plan  Gastrografin injection for evaluation of the pylorus for gastric outlet obstruction.  GI recommending.    Surgery consulting also.    Medical Decision Making  Number and Complexity of problems:   Gastric outlet obstruction high complexity  Anoxic brain injury high complexity  Functional quadriplegia high complexity  Tracheostomy moderate complexity  Differential Diagnosis:     Conditions and Status        Condition is unchanged.     MDM Data  External documents reviewed: Reviewed  Cardiac tracing (EKG, telemetry) interpretation: Reviewed  Radiology interpretation: Reviewed  Labs reviewed: Reviewed  Any tests that were considered but not ordered: None     Decision rules/scores evaluated (example [id number], Wells, etc): None     Discussed with:  Nursing staff     Care Planning  Shared decision making: GI and general surgery  Code status and discussions: DNR/DNI    Disposition  Social Determinants of Health that impact treatment or disposition: None  I expect the patient to be discharged to NH in 1-2 days.     Electronically signed by Renetta Montana DO, 04/06/24, 13:36 CDT.

## 2024-04-06 NOTE — PLAN OF CARE
Goal Outcome Evaluation: Patient was brought to the ER from nursing home, due to abdominal discomfort. She was found to have a gastic outlet obstruction. GI has been consulted and patient is getting gastrograffin at this time to see if she does has an obstruction.  Antibiotics given as ordered. Patient safety to be maintained this shift, continue to monitor and report abnormal to provider.

## 2024-04-06 NOTE — CONSULTS
[facility] Gastroenterology  Inpatient Consult Note  Today's date:  04/06/24    Namita Zabala  1975       Referring Provider: Carter Kellogg DO  Primary Physician: David Lara MD   Primary Gastroenterologist: Unknown    Date of Admission: 4/5/2024  Date of Service:  04/06/24    Reason for Consultation/Chief Complaint: Rule out gastric outlet obstruction for vomiting.  CT scan suggested gastric distention and possible gastric outlet obstruction.  She does have a PEG tube and this was replaced with a balloon-tip PEG tube in February of this year.  No endoscopy reports are available for review.  The patient is unable to give any history.  The nursing staff states that the patient has been well without any vomiting since admission.  The PEG tube is capped and not being used.    History of present illness: 48-year-old patient with a history of functional quadriplegia and history of anoxic brain injury who was sent from the nursing home    Past Medical History:   Diagnosis Date    Acute kidney failure, unspecified     Angina at rest     Anoxic brain damage, not elsewhere classified     Chronic pulmonary embolism     Chronic respiratory failure, unspecified whether with hypoxia or hypercapnia     Dependence on respirator (ventilator) status     Gastrointestinal hemorrhage, unspecified     Hypercalcemia     Iron deficiency anemia     Metabolic encephalopathy     Migraine     Sees Pain Management for injections.     MVP (mitral valve prolapse)     Other dysphagia     Other pulmonary embolism with acute cor pulmonale     Renal disorder     Ruptured bladder     Syncope     Urinary tract infection, site not specified        Past Surgical History:   Procedure Laterality Date    ABDOMINAL SURGERY      BREAST BIOPSY Right 2011    benign    GTUBE INSERTION      INSERTION HEMODIALYSIS CATHETER Left 09/16/2021    Procedure: HEMODIALYSIS CATHETER INSERTION;  Surgeon: Anders Kaur MD;   Location: [facility] OR 12;  Service: Vascular;  Laterality: Left;    TONSILLECTOMY      TRACHEOSTOMY          Allergies   Allergen Reactions    Coconut Unknown - High Severity    Levaquin [Levofloxacin] Other (See Comments)     unknown    Nuts Unknown - High Severity    Penicillins Rash     Patient's father noted patient had taken penicillin, many years ago, many times and then started developing a rash when she would take it.  She has received cefepime, ceftriaxone and cephalexin with no known adverse problems    Turkey Other (See Comments)     Causes migraines per pt reports       Medications Prior to Admission   Medication Sig Dispense Refill Last Dose    acetaminophen (TYLENOL) 500 MG tablet Administer 1 tablet per G tube Every 4 (Four) Hours As Needed for Fever. Not to exceed 3000mg from all sources daily       bisacodyl (DULCOLAX) 10 MG suppository Insert 1 suppository into the rectum Every Other Day As Needed for Constipation.       cetirizine (zyrTEC) 10 MG tablet Take 1 tablet by mouth Daily.       chlorhexidine (PERIDEX) 0.12 % solution Apply 15 mL to the mouth or throat 2 (Two) Times a Day. 15 ml using oral swab twice daily for oral care       Citric Yz-Dgqdpazflmc-Ve Carb (Renacidin) solution Irrigate with 60 mL to the affected area as directed by provider 2 (Two) Times a Day. Irrigate srtatton catheter       ertapenem 1,000 mg in sodium chloride 0.9 % 100 mL IVPB Infuse 1,000 mg into a venous catheter Daily for 4 doses. Indications: Urinary Tract Infection       fluticasone (FLONASE) 50 MCG/ACT nasal spray 2 sprays into the nostril(s) as directed by provider Daily.       guaifenesin (ROBITUSSIN) 100 MG/5ML liquid Administer 30 mL per G tube 2 (Two) Times a Day.       hydrOXYzine (ATARAX) 25 MG tablet Administer 1 tablet per G tube Every 6 (Six) Hours As Needed for Itching.       liver oil-zinc oxide (DESITIN) 40 % ointment Apply 1 application  topically to the appropriate area as directed Every 4  (Four) Hours As Needed for Irritation. (Patient taking differently: Apply 1 Application topically to the appropriate area as directed Every 4 (Four) Hours As Needed for Irritation. To perirectal and groin area after any stool incontinence)       miconazole (MICOTIN) 2 % powder Apply 1 Application topically to the appropriate area as directed 2 (Two) Times a Day As Needed for Itching. Apply to groin or gluteal folds for rash, itching, redness and/or irritation       nystatin (MYCOSTATIN) 919686 UNIT/GM powder Apply 1 Application topically to the appropriate area as directed 2 (Two) Times a Day. To bilateral bendarms, left abdominal fold, and under left breast       ondansetron (ZOFRAN) 4 MG/5ML solution Take 5 mL by mouth Every 6 (Six) Hours As Needed for Nausea or Vomiting.       Scopolamine 1 MG/3DAYS patch Place 1 patch on the skin as directed by provider Every 72 (Seventy-Two) Hours.       simethicone (MYLICON) 40 MG/0.6ML drops Administer 0.3 mL per G tube Every 6 (Six) Hours.       sodium chloride (NS) 0.9 % irrigation Irrigate with 10 mL to the affected area as directed by provider Every 8 (Eight) Hours. Irrigation for secretions: Hydration       Sodium Phosphates (fleet enema) 7-19 GM/118ML enema Insert 1 enema into the rectum Every Other Day As Needed (bowel management).          Hospital Medications (active)         Dose Frequency Start End    acetaminophen (TYLENOL) 160 MG/5ML oral solution 650 mg 650 mg Every 4 Hours PRN 4/6/2024 --    Admin Instructions: If given for fever, use fever parameter: fever greater than 100.4 °F  Based on patient request - if ordered for moderate or severe pain, provider allows for administration of a medication prescribed for a lower pain scale.    Do not exceed 4 grams of acetaminophen in a 24 hr period. Max dose of 2gm for AST/ALT greater than 120 units/L.    If given for pain, use the following pain scale:   Mild Pain = Pain Score of 1-3, CPOT 1-2  Moderate Pain = Pain  "Score of 4-6, CPOT 3-4  Severe Pain = Pain Score of 7-10, CPOT 5-8    Route: Oral    Linked Group 1: Placed in \"Or\" Linked Group        acetaminophen (TYLENOL) suppository 650 mg 650 mg Every 4 Hours PRN 4/6/2024 --    Admin Instructions: If given for fever, use fever parameter: fever greater than 100.4 °F  Based on patient request - if ordered for moderate or severe pain, provider allows for administration of a medication prescribed for a lower pain scale.    Do not exceed 4 grams of acetaminophen in a 24 hr period. Max dose of 2gm for AST/ALT greater than 120 units/L.    If given for pain, use the following pain scale:   Mild Pain = Pain Score of 1-3, CPOT 1-2  Moderate Pain = Pain Score of 4-6, CPOT 3-4  Severe Pain = Pain Score of 7-10, CPOT 5-8    Route: Rectal    Linked Group 1: Placed in \"Or\" Linked Group        acetaminophen (TYLENOL) tablet 650 mg 650 mg Every 4 Hours PRN 4/6/2024 --    Admin Instructions: If given for fever, use fever parameter: fever greater than 100.4 °F  Based on patient request - if ordered for moderate or severe pain, provider allows for administration of a medication prescribed for a lower pain scale.    Do not exceed 4 grams of acetaminophen in a 24 hr period. Max dose of 2gm for AST/ALT greater than 120 units/L.    If given for pain, use the following pain scale:   Mild Pain = Pain Score of 1-3, CPOT 1-2  Moderate Pain = Pain Score of 4-6, CPOT 3-4  Severe Pain = Pain Score of 7-10, CPOT 5-8    Route: Oral    Linked Group 1: Placed in \"Or\" Linked Group        bisacodyl (DULCOLAX) EC tablet 5 mg 5 mg Daily PRN 4/6/2024 --    Admin Instructions: Use if no bowel movement after 12 hours.  Swallow whole. Do not crush, split, or chew tablet.    Route: Oral    Linked Group 2: Placed in \"And\" Linked Group        bisacodyl (DULCOLAX) suppository 10 mg 10 mg Daily PRN 4/6/2024 --    Admin Instructions: Use if no bowel movement after 12 hours.  Hold for diarrhea    Route: Rectal    Linked " "Group 2: Placed in \"And\" Linked Group        Calcium Replacement - Follow Nurse / BPA Driven Protocol  As Needed 4/6/2024 --    Admin Instructions: Open Order & Select \"BHS Electrolyte Replacement Protocol Algorithm\" to View Details    Route: Does not apply    HYDROmorphone (DILAUDID) injection 0.5 mg 0.5 mg Once 4/6/2024 --    Admin Instructions: Based on patient request - if ordered for moderate or severe pain, provider allows for administration of a medication prescribed for a lower pain scale.  If given for pain, use the following pain scale:  Mild Pain = Pain Score of 1-3, CPOT 1-2  Moderate Pain = Pain Score of 4-6, CPOT 3-4  Severe Pain = Pain Score of 7-10, CPOT 5-8    Route: Intravenous    lactated ringers infusion 100 mL/hr Continuous 4/6/2024 --    Route: Intravenous    Magnesium Low Dose Replacement - Follow Nurse / BPA Driven Protocol  As Needed 4/6/2024 --    Admin Instructions: Open Order & Select \"BHS Electrolyte Replacement Protocol Algorithm\" to View Details    Route: Does not apply    Morphine sulfate (PF) injection 1 mg 1 mg Every 4 Hours PRN 4/6/2024 4/11/2024    Admin Instructions: Based on patient request - if ordered for moderate or severe pain, provider allows for administration of a medication prescribed for a lower pain scale.  Unknown    Caution: Look alike/sound alike drug alert    If given for pain, use the following pain scale:  Mild Pain = Pain Score of 1-3, CPOT 1-2  Moderate Pain = Pain Score of 4-6, CPOT 3-4  Severe Pain = Pain Score of 7-10, CPOT 5-8    Route: Intravenous    Linked Group 3: Placed in \"And\" Linked Group        Morphine sulfate (PF) injection 2 mg 2 mg Every 4 Hours PRN 4/6/2024 4/11/2024    Admin Instructions: Based on patient request - if ordered for moderate or severe pain, provider allows for administration of a medication prescribed for a lower pain scale.  Unknown    Caution: Look alike/sound alike drug alert    If given for pain, use the following pain " "scale:  Mild Pain = Pain Score of 1-3, CPOT 1-2  Moderate Pain = Pain Score of 4-6, CPOT 3-4  Severe Pain = Pain Score of 7-10, CPOT 5-8    Route: Intravenous    Linked Group 4: Placed in \"And\" Linked Group        naloxone (NARCAN) injection 0.4 mg 0.4 mg Every 5 Minutes PRN 4/6/2024 --    Admin Instructions: If respiratory rate is less than 8 breaths/minute or patient is difficult to arouse stop any narcotics and contact physician.   Administer slow IV push. Repeat as ordered until patient's respiratory rate is greater than 12 breaths/minute.    Route: Intravenous    Linked Group 3: Placed in \"And\" Linked Group        naloxone (NARCAN) injection 0.4 mg 0.4 mg Every 5 Minutes PRN 4/6/2024 --    Admin Instructions: If Respiratory Rate Less Than 8 or Patient is Difficult to Arouse, Stop ALL Narcotics & Contact Provider.  Administer Slow IV Push.  Repeat As Ordered Until Respiratory Rate is Greater Than 12.    Route: Intravenous    Linked Group 4: Placed in \"And\" Linked Group        nitroglycerin (NITROSTAT) SL tablet 0.4 mg 0.4 mg Every 5 Minutes PRN 4/6/2024 --    Admin Instructions: If Pain Unrelieved After 3 Doses Notify MD  May administer up to 3 doses per episode.    Route: Sublingual    ondansetron (ZOFRAN) injection 4 mg 4 mg Every 6 Hours PRN 4/6/2024 --    Admin Instructions: If BOTH ondansetron (ZOFRAN) and promethazine (PHENERGAN) are ordered use ondansetron first and THEN promethazine IF ondansetron is ineffective.    Route: Intravenous    Linked Group 5: Placed in \"Or\" Linked Group        ondansetron ODT (ZOFRAN-ODT) disintegrating tablet 4 mg 4 mg Every 6 Hours PRN 4/6/2024 --    Admin Instructions: If BOTH ondansetron (ZOFRAN) and promethazine (PHENERGAN) are ordered use ondansetron first and THEN promethazine IF ondansetron is ineffective.  Place on tongue and allow to dissolve.    Route: Oral    Linked Group 5: Placed in \"Or\" Linked Group        pantoprazole (PROTONIX) injection 40 mg 40 mg Every 12 " "Hours Scheduled 4/6/2024 --    Admin Instructions: Dilute with 10 mL of 0.9% NaCl and give IV push over 2 minutes.    Route: Intravenous    Phosphorus Replacement - Follow Nurse / BPA Driven Protocol  As Needed 4/6/2024 --    Admin Instructions: Open Order & Select \"BHS Electrolyte Replacement Protocol Algorithm\" to View Details    Route: Does not apply    polyethylene glycol (MIRALAX) packet 17 g 17 g Daily PRN 4/6/2024 --    Admin Instructions: Use if no bowel movement after 12 hours. Mix in 6-8 ounces of water.  Use 4-8 ounces of water, tea, or juice for each 17 gram dose.    Route: Oral    Linked Group 2: Placed in \"And\" Linked Group        Potassium Replacement - Follow Nurse / BPA Driven Protocol  As Needed 4/6/2024 --    Admin Instructions: Open Order & Select \"BHS Electrolyte Replacement Protocol Algorithm\" to View Details    Route: Does not apply    sennosides-docusate (PERICOLACE) 8.6-50 MG per tablet 2 tablet 2 tablet 2 Times Daily PRN 4/6/2024 --    Admin Instructions: Start bowel management regimen if patient has not had a bowel movement after 12 hours.    Route: Oral    Linked Group 2: Placed in \"And\" Linked Group        sodium chloride 0.9 % flush 10 mL 10 mL Every 12 Hours Scheduled 4/6/2024 --    Route: Intravenous    sodium chloride 0.9 % flush 10 mL 10 mL As Needed 4/6/2024 --    Route: Intravenous    sodium chloride 0.9 % infusion 40 mL 40 mL As Needed 4/6/2024 --    Admin Instructions: Following administration of an IV intermittent medication, flush line with 40mL NS at 100mL/hr.    Route: Intravenous            Social History     Tobacco Use    Smoking status: Never    Smokeless tobacco: Never   Substance Use Topics    Alcohol use: No        Past Family History:  Family History   Problem Relation Age of Onset    Colon cancer Maternal Aunt 52    Fibromyalgia Mother     Heart disease Mother     Hypertension Mother     Hodgkin's lymphoma Paternal Grandmother     Ovarian cancer Neg Hx     Breast " cancer Neg Hx        Review of Systems:  Unobtainable      Physical Exam:  Temp:  [98.8 °F (37.1 °C)-100.3 °F (37.9 °C)] 100.3 °F (37.9 °C)  Heart Rate:  [104-138] 134  Resp:  [16-18] 18  BP: (124-145)/(61-98) 124/93  Body mass index is 22.75 kg/m².    Intake/Output Summary (Last 24 hours) at 4/6/2024 0926  Last data filed at 4/6/2024 0545  Gross per 24 hour   Intake --   Output 600 ml   Net -600 ml     No intake/output data recorded.    General appearance:   HEENT: Nonicteric sclerae.  Moist oral mucosa.  PERRLA.  EOMI.  Clear pharynx.  Lungs: Clear to auscultation bilaterally.  No wheezing, rales or rhonchi.  Heart: Regular rate and rhythm.  Normal S1 and S2, no S3, S4 or murmur.  Abdomen: Soft, nondistended, nontender to palpation, with normoactive bowel sounds, no hepatosplenomegaly, no palpable masses.  PEG tube at 4 cm at skin level intact and site was clear.  Extremities: No cyanosis, edema or pulse deficits.  Skin: No rash or jaundice.  Neurologic: Alert but not communicative.  Extremity contractures.  Results Review:  Lab Results (last 24 hours)       Procedure Component Value Units Date/Time    Comprehensive Metabolic Panel [[id number]]  (Abnormal) Collected: 04/06/24 0809    Specimen: Blood Updated: 04/06/24 0838     Glucose 111 mg/dL      BUN 17 mg/dL      Creatinine 0.40 mg/dL      Sodium 140 mmol/L      Potassium 4.3 mmol/L      Chloride 104 mmol/L      CO2 24.0 mmol/L      Calcium 10.0 mg/dL      Total Protein 7.4 g/dL      Albumin 4.3 g/dL      ALT (SGPT) 17 U/L      AST (SGOT) 16 U/L      Alkaline Phosphatase 86 U/L      Total Bilirubin 0.5 mg/dL      Globulin 3.1 gm/dL      A/G Ratio 1.4 g/dL      BUN/Creatinine Ratio 42.5     Anion Gap 12.0 mmol/L      eGFR 123.8 mL/min/1.73     Narrative:      GFR Normal >60  Chronic Kidney Disease <60  Kidney Failure <15      Magnesium [[id number]]  (Normal) Collected: 04/06/24 0809    Specimen: Blood Updated: 04/06/24 0838     Magnesium 2.1 mg/dL     Phosphorus  [[id number]]  (Normal) Collected: 04/06/24 0809    Specimen: Blood Updated: 04/06/24 0838     Phosphorus 2.6 mg/dL     Protime-INR [[id number]]  (Normal) Collected: 04/06/24 0809    Specimen: Blood Updated: 04/06/24 0828     Protime 13.5 Seconds      INR 0.99    aPTT [[id number]]  (Normal) Collected: 04/06/24 0809    Specimen: Blood Updated: 04/06/24 0828     PTT 29.5 seconds     CBC Auto Differential [[id number]]  (Abnormal) Collected: 04/06/24 0809    Specimen: Blood Updated: 04/06/24 0818     WBC 9.72 10*3/mm3      RBC 3.63 10*6/mm3      Hemoglobin 11.0 g/dL      Hematocrit 34.1 %      MCV 93.9 fL      MCH 30.3 pg      MCHC 32.3 g/dL      RDW 13.2 %      RDW-SD 45.1 fl      MPV 9.2 fL      Platelets 280 10*3/mm3      Neutrophil % 76.4 %      Lymphocyte % 14.3 %      Monocyte % 7.5 %      Eosinophil % 1.2 %      Basophil % 0.3 %      Immature Grans % 0.3 %      Neutrophils, Absolute 7.42 10*3/mm3      Lymphocytes, Absolute 1.39 10*3/mm3      Monocytes, Absolute 0.73 10*3/mm3      Eosinophils, Absolute 0.12 10*3/mm3      Basophils, Absolute 0.03 10*3/mm3      Immature Grans, Absolute 0.03 10*3/mm3      nRBC 0.0 /100 WBC     Comprehensive Metabolic Panel [[id number]]  (Abnormal) Collected: 04/05/24 2310    Specimen: Blood from Arm, Left Updated: 04/05/24 2338     Glucose 102 mg/dL      BUN 18 mg/dL      Creatinine 0.40 mg/dL      Sodium 140 mmol/L      Potassium 4.5 mmol/L      Chloride 102 mmol/L      CO2 23.0 mmol/L      Calcium 10.1 mg/dL      Total Protein 7.7 g/dL      Albumin 4.4 g/dL      ALT (SGPT) 18 U/L      AST (SGOT) 16 U/L      Alkaline Phosphatase 86 U/L      Total Bilirubin 0.3 mg/dL      Globulin 3.3 gm/dL      A/G Ratio 1.3 g/dL      BUN/Creatinine Ratio 45.0     Anion Gap 15.0 mmol/L      eGFR 123.8 mL/min/1.73     Narrative:      GFR Normal >60  Chronic Kidney Disease <60  Kidney Failure <15      Magnesium [[id number]]  (Normal) Collected: 04/05/24 4800    Specimen: Blood from Arm, Left Updated:  04/05/24 2338     Magnesium 2.1 mg/dL     Lipase [[id number]]  (Normal) Collected: 04/05/24 2310    Specimen: Blood from Arm, Left Updated: 04/05/24 2338     Lipase 36 U/L     Lactic Acid, Plasma [[id number]]  (Normal) Collected: 04/05/24 2310    Specimen: Blood from Arm, Left Updated: 04/05/24 2337     Lactate 0.9 mmol/L     hCG, Serum, Qualitative [[id number]]  (Normal) Collected: 04/05/24 2310    Specimen: Blood from Arm, Left Updated: 04/05/24 2328     HCG Qualitative Negative    CBC & Differential [[id number]]  (Abnormal) Collected: 04/05/24 2310    Specimen: Blood from Arm, Left Updated: 04/05/24 2318    Narrative:      The following orders were created for panel order CBC & Differential.  Procedure                               Abnormality         Status                     ---------                               -----------         ------                     CBC Auto Differential[[id number]]        Abnormal            Final result                 Please view results for these tests on the individual orders.    CBC Auto Differential [[id number]]  (Abnormal) Collected: 04/05/24 2310    Specimen: Blood from Arm, Left Updated: 04/05/24 2318     WBC 9.94 10*3/mm3      RBC 3.77 10*6/mm3      Hemoglobin 11.6 g/dL      Hematocrit 34.3 %      MCV 91.0 fL      MCH 30.8 pg      MCHC 33.8 g/dL      RDW 13.3 %      RDW-SD 44.0 fl      MPV 9.6 fL      Platelets 304 10*3/mm3      Neutrophil % 69.5 %      Lymphocyte % 17.9 %      Monocyte % 7.9 %      Eosinophil % 4.0 %      Basophil % 0.3 %      Immature Grans % 0.4 %      Neutrophils, Absolute 6.90 10*3/mm3      Lymphocytes, Absolute 1.78 10*3/mm3      Monocytes, Absolute 0.79 10*3/mm3      Eosinophils, Absolute 0.40 10*3/mm3      Basophils, Absolute 0.03 10*3/mm3      Immature Grans, Absolute 0.04 10*3/mm3      nRBC 0.0 /100 WBC             Radiology Review:  Imaging Results (Last 72 Hours)       Procedure Component Value Units Date/Time    CT Abdomen Pelvis With Contrast  [[id number]] Collected: 04/06/24 0212     Updated: 04/06/24 0300    Narrative:      CT ABDOMEN PELVIS W CONTRAST- 4/5/2024 10:35 PM     HISTORY: distention and vomiting       COMPARISON: 3/8/2024.     DLP: 1372.14 mGy.cm. All CT scans are performed using dose optimization  techniques as appropriate to the performed exam and including at least  one of the following: Automated exposure control, adjustment of the mA  and/or kV according to size, and the use of the iterative reconstruction  technique.     TECHNIQUE: Following the intravenous administration of contrast, helical  CT tomographic images of the abdomen and pelvis were acquired. Coronal  reformatted images were also provided for review.     FINDINGS:  Bibasilar atelectasis is present. The base of the heart is unremarkable.  There is no pericardial effusion..     LIVER: No focal liver lesion. The hepatic vasculature is patent.     BILIARY SYSTEM: The gallbladder is unremarkable. No intrahepatic or  extrahepatic ductal dilatation.     PANCREAS: No focal pancreatic lesion.     SPLEEN: Unremarkable.     KIDNEYS AND ADRENALS: A left-sided percutaneous nephrostomy tube as well  as intraureteral stent remain in place. The pigtail catheter of the  nephrostomy tube remains well-positioned within the left renal pelvis.  Left-sided nephrolithiasis is present. There is moderate distention of  the upper tracts of the left kidney stable from the previous exam. No  perinephric fluid collection is present. There is urothelial thickening  of the upper tracts of the left kidney and left ureter with the stent  well-positioned. The distal aspect of the left pigtail catheter is in  the urinary bladder. Right-sided nephrolithiasis is present including a  larger stone measuring 1.4 cm in size in the right renal pelvis. This is  stable. There is a stable distention of the upper tracts of the right  kidney. The more distal right ureter is decompressed and normal in  appearance with no  additional ureteral calculus identified.. No  developing perinephric fluid collections..     RETROPERITONEUM: No mass, lymphadenopathy or hemorrhage.     GI TRACT: A gastrostomy tube is in place within the stomach. There is  moderate to severe distention of the gastric lumen. There is a  transition at the gastric outlet with the descending and transverse  duodenum being normal in caliber. I do not see any adjacent free air or  focal bowel wall thickening. As noted previously there is diastases of  the abdominal musculature ventrally at multiple levels with associated  areas of abdominal wall hernia. Several loops of small bowel and colon  are rather adherent to the anterior abdominal wall with protrusion of  segments of colon through abdominal wall defects at several levels  including in the region of the umbilicus and left lower quadrant. The  degree of distention of these protuberant segments of colon I feel are  similar to the previous examination. There has been evacuation of the  large volume of fecal material which was noted throughout the colon  since the previous exam. Air-fluid levels are noted in some of the colon  loops status post evacuation of the fecal material. No pneumatosis or  focal bowel wall thickening appreciated.        OTHER: There is no mesenteric mass, lymphadenopathy or fluid collection.  The abdominopelvic vasculature is patent. There is stranding in the  subcutaneous tissues of the anterior abdominal wall on the right likely  related to subcutaneous injection. No localized fluid collection or mass  is demonstrated within the soft tissues. There is chronic deformity of  the bony pelvis similar to the previous examination. Previously noted  coarsened appearance of the left femoral shaft shows interval  improvement. A sclerotic lesion in the greater trochanter of the left  hip is stable from the previous exam. No definite bone destruction or  occult fracture.. No free fluid is present in the  abdomen or pelvis.     PELVIS: A Mojica catheter is in place within the bladder. The patient's  left-sided nephroureteral stent is in place with the distal pigtail  catheter in the bladder. No free fluid in the pelvis.. The urinary  bladder is decompressed with a Mojica catheter in place..       Impression:      1. When compared to the previous exam of 3/20/2024 there is moderate to  severe distention of the stomach with an air-fluid level. There is no  gastric wall thickening. There is an apparent transition near the  gastric outlet with the descending duodenum and transverse duodenum  normal in caliber. I do not see any convincing evidence of volvulus.  There is no adjacent inflammatory changes or mass appreciated. A  gastrostomy tube is in place. FINDINGS worrisome for a gastric outlet  obstruction or at least stenosis.  2. When compared to the previous exam the large volume of fecal material  which was noted throughout the colon has been evacuated. Several of the  previously noted distended loops of colon which were previously  stool-filled are now fluid-filled with scattered colonic air-fluid  levels. As noted previously the small bowel is normal in caliber with  only a few loops of bowel in the left lower quadrant which are mildly  distended. I feel the small bowel is similar in distribution and  appearance to the previous exam of 3/8/2024. The patient does  demonstrate multiple areas of diastases of the abdominal musculature as  well as adjacent hernias. There is protrusion of portions of the colon  into these areas of diastases and abdominal wall defect and specifically  in the region of the umbilicus and left lower quadrant.. These are  similar in distribution and size to the previous exam. There is no free  fluid or localized inflammation appreciated in the abdomen or pelvis.  3. A left-sided nephroureteral stent remains in place. There is  urothelial thickening and mild dilatation of the upper tracts of  the  left kidney stable from the previous exam. The left-sided ureteral stent  remains well-positioned. There is a Mojica catheter in place within the  bladder with decompression of the bladder. Right-sided nephrolithiasis  is also present with urothelial thickening. There is a larger stone in  the right renal pelvis unchanged from the previous exam. Below the level  of the right UPJ the right ureter is decompressed and normal in  appearance.  4. Bony deformity of the pelvis. This is also stable from the previous  exam. There is a stable sclerotic lesion in the greater trochanter of  the left hip perhaps representing a bone island. Permeative appearance  of the left femur shows interval improvement. No occult fracture.  5. Bibasilar atelectasis.        This report was signed and finalized on 4/6/2024 2:57 AM by Dr. Yuniel De Jesus MD.               Impression/Plan:    Gastric outlet obstruction      Gastric distention on CT scan with PEG tube in place on CT scan.  Possibilities include balloon occlusion of the pylorus which is now resolved.  In any event, would recommend a Gastrografin injection with abdominal x-ray through the PEG tube to rule out gastric outlet obstruction.  If there is evidence of obstruction radiologically, an upper endoscopy would be warranted and general surgical consultation.  However if she shows a patent pylorus with no gastric outlet obstruction may resume PEG tube feedings per the primary/referring service with head of bed elevated greater than 40 degrees at all times.  The above was discussed in detail with the patient's nursing staff and she agreed.  Further recommendations based on the results of the above studies and the patient's clinical course.  If the patient should have any further emesis, would recommend placing the PEG tube to dependent drainage or low intermittent suction.  Thank you    Robert Ramos MD  04/06/24   09:33 CDT    DISCLAIMER:    This physician works through a  [employer] as an inpatient consultant gastroenterologist only and has no outpatient clinic for patient follow up.  Any results not available at time of inpatient discharge and/or GI clinic follow up should be managed by the hospitalist team, PCP, or outpatient gastroenterologist.

## 2024-04-07 PROCEDURE — 25010000002 MORPHINE PER 10 MG: Performed by: STUDENT IN AN ORGANIZED HEALTH CARE EDUCATION/TRAINING PROGRAM

## 2024-04-07 PROCEDURE — 94799 UNLISTED PULMONARY SVC/PX: CPT

## 2024-04-07 PROCEDURE — 25810000003 LACTATED RINGERS PER 1000 ML: Performed by: STUDENT IN AN ORGANIZED HEALTH CARE EDUCATION/TRAINING PROGRAM

## 2024-04-07 PROCEDURE — 87040 BLOOD CULTURE FOR BACTERIA: CPT | Performed by: FAMILY MEDICINE

## 2024-04-07 PROCEDURE — 25010000002 ERTAPENEM PER 500 MG: Performed by: FAMILY MEDICINE

## 2024-04-07 RX ORDER — ROSUVASTATIN CALCIUM 5 MG/1
5 TABLET, COATED ORAL NIGHTLY
Status: DISCONTINUED | OUTPATIENT
Start: 2024-04-07 | End: 2024-04-08 | Stop reason: HOSPADM

## 2024-04-07 RX ORDER — AMOXICILLIN 250 MG
2 CAPSULE ORAL 2 TIMES DAILY
Status: DISCONTINUED | OUTPATIENT
Start: 2024-04-07 | End: 2024-04-08 | Stop reason: HOSPADM

## 2024-04-07 RX ORDER — HYDROMORPHONE HYDROCHLORIDE 1 MG/ML
0.5 INJECTION, SOLUTION INTRAMUSCULAR; INTRAVENOUS; SUBCUTANEOUS
Status: DISCONTINUED | OUTPATIENT
Start: 2024-04-07 | End: 2024-04-08 | Stop reason: HOSPADM

## 2024-04-07 RX ORDER — HYDROCODONE BITARTRATE AND ACETAMINOPHEN 5; 325 MG/1; MG/1
1 TABLET ORAL EVERY 4 HOURS PRN
Status: DISCONTINUED | OUTPATIENT
Start: 2024-04-07 | End: 2024-04-08 | Stop reason: HOSPADM

## 2024-04-07 RX ORDER — BACLOFEN 10 MG/1
20 TABLET ORAL EVERY 6 HOURS
Status: DISCONTINUED | OUTPATIENT
Start: 2024-04-07 | End: 2024-04-08 | Stop reason: HOSPADM

## 2024-04-07 RX ORDER — POLYETHYLENE GLYCOL 3350 17 G/17G
17 POWDER, FOR SOLUTION ORAL DAILY
Status: DISCONTINUED | OUTPATIENT
Start: 2024-04-07 | End: 2024-04-08 | Stop reason: HOSPADM

## 2024-04-07 RX ORDER — HYDROXYZINE HYDROCHLORIDE 25 MG/1
25 TABLET, FILM COATED ORAL EVERY 6 HOURS PRN
Status: DISCONTINUED | OUTPATIENT
Start: 2024-04-07 | End: 2024-04-08 | Stop reason: HOSPADM

## 2024-04-07 RX ORDER — MULTIPLE VITAMINS W/ MINERALS TAB 9MG-400MCG
1 TAB ORAL DAILY
Status: DISCONTINUED | OUTPATIENT
Start: 2024-04-07 | End: 2024-04-08 | Stop reason: HOSPADM

## 2024-04-07 RX ADMIN — ROSUVASTATIN CALCIUM 5 MG: 5 TABLET, FILM COATED ORAL at 21:34

## 2024-04-07 RX ADMIN — Medication 1 TABLET: at 19:16

## 2024-04-07 RX ADMIN — Medication 10 ML: at 08:35

## 2024-04-07 RX ADMIN — PANTOPRAZOLE SODIUM 40 MG: 40 INJECTION, POWDER, FOR SOLUTION INTRAVENOUS at 08:34

## 2024-04-07 RX ADMIN — Medication 10 ML: at 08:34

## 2024-04-07 RX ADMIN — BACLOFEN 20 MG: 10 TABLET ORAL at 19:16

## 2024-04-07 RX ADMIN — POLYETHYLENE GLYCOL 3350 17 G: 17 POWDER, FOR SOLUTION ORAL at 19:15

## 2024-04-07 RX ADMIN — ERTAPENEM 1000 MG: 1 INJECTION INTRAMUSCULAR; INTRAVENOUS at 09:55

## 2024-04-07 RX ADMIN — ACETAMINOPHEN 650 MG: 650 SUPPOSITORY RECTAL at 16:25

## 2024-04-07 RX ADMIN — SODIUM CHLORIDE, POTASSIUM CHLORIDE, SODIUM LACTATE AND CALCIUM CHLORIDE 100 ML/HR: 600; 310; 30; 20 INJECTION, SOLUTION INTRAVENOUS at 08:15

## 2024-04-07 RX ADMIN — MORPHINE SULFATE 2 MG: 2 INJECTION, SOLUTION INTRAMUSCULAR; INTRAVENOUS at 14:57

## 2024-04-07 RX ADMIN — DOCUSATE SODIUM AND SENNOSIDES 2 TABLET: 8.6; 5 TABLET, FILM COATED ORAL at 21:34

## 2024-04-07 RX ADMIN — SODIUM CHLORIDE, POTASSIUM CHLORIDE, SODIUM LACTATE AND CALCIUM CHLORIDE 100 ML/HR: 600; 310; 30; 20 INJECTION, SOLUTION INTRAVENOUS at 18:41

## 2024-04-07 NOTE — PROGRESS NOTES
[facility] Gastroenterology  Inpatient Progress Note  Today's date:  04/07/24    Namita Zabala  1975       Reason for Follow Up: Possible gastric outlet obstruction, vomiting    Subjective:   PEG tube injection shows contrast passed into the small bowel and colon without any retained contrast.  No reports of nausea or vomiting per chart review.    Allergies   Allergen Reactions    Coconut Unknown - High Severity    Levaquin [Levofloxacin] Other (See Comments)     unknown    Nuts Unknown - High Severity    Penicillins Rash     Patient's father noted patient had taken penicillin, many years ago, many times and then started developing a rash when she would take it.  She has received cefepime, ceftriaxone and cephalexin with no known adverse problems    Turkey Other (See Comments)     Causes migraines per pt reports       Current Facility-Administered Medications:     acetaminophen (TYLENOL) tablet 650 mg, 650 mg, Oral, Q4H PRN **OR** acetaminophen (TYLENOL) 160 MG/5ML oral solution 650 mg, 650 mg, Oral, Q4H PRN **OR** acetaminophen (TYLENOL) suppository 650 mg, 650 mg, Rectal, Q4H PRN, Mummaneni, Sundeep, DO, 650 mg at 04/06/24 2027    sennosides-docusate (PERICOLACE) 8.6-50 MG per tablet 2 tablet, 2 tablet, Oral, BID PRN **AND** polyethylene glycol (MIRALAX) packet 17 g, 17 g, Oral, Daily PRN **AND** bisacodyl (DULCOLAX) EC tablet 5 mg, 5 mg, Oral, Daily PRN **AND** bisacodyl (DULCOLAX) suppository 10 mg, 10 mg, Rectal, Daily PRN, Mummaneni, Sundeep, DO    Calcium Replacement - Follow Nurse / BPA Driven Protocol, , Does not apply, PRN, Mummaneni, Sundeep, DO    ertapenem (INVanz) 1,000 mg in sodium chloride 0.9 % 100 mL MBP, 1,000 mg, Intravenous, Q24H, Renetta Montana,     HYDROmorphone (DILAUDID) injection 0.5 mg, 0.5 mg, Intravenous, Once, Mummaneni, Sundeep, DO    lactated ringers infusion, 100 mL/hr, Intravenous, Continuous, Carter Kellogg DO, Last Rate: 100 mL/hr at 04/07/24  0815, 100 mL/hr at 04/07/24 0815    Magnesium Low Dose Replacement - Follow Nurse / BPA Driven Protocol, , Does not apply, PRN, Mummaneni, Sundeep, DO    Morphine sulfate (PF) injection 1 mg, 1 mg, Intravenous, Q4H PRN, 1 mg at 04/06/24 1525 **AND** naloxone (NARCAN) injection 0.4 mg, 0.4 mg, Intravenous, Q5 Min PRN, Mummaneni, Sundeep, DO    Morphine sulfate (PF) injection 2 mg, 2 mg, Intravenous, Q4H PRN, 2 mg at 04/06/24 2239 **AND** naloxone (NARCAN) injection 0.4 mg, 0.4 mg, Intravenous, Q5 Min PRN, Mummaneni, Sundeep, DO    nitroglycerin (NITROSTAT) SL tablet 0.4 mg, 0.4 mg, Sublingual, Q5 Min PRN, Mummaneni, Sundeep, DO    ondansetron ODT (ZOFRAN-ODT) disintegrating tablet 4 mg, 4 mg, Oral, Q6H PRN **OR** ondansetron (ZOFRAN) injection 4 mg, 4 mg, Intravenous, Q6H PRN, Mummaneni, Sundeep, DO    pantoprazole (PROTONIX) injection 40 mg, 40 mg, Intravenous, Q12H, Robert Ramos MD, 40 mg at 04/07/24 0834    Phosphorus Replacement - Follow Nurse / BPA Driven Protocol, , Does not apply, PRN, Mummaneni, Sundeep, DO    Potassium Replacement - Follow Nurse / BPA Driven Protocol, , Does not apply, PRN, Mummaneni, Sundeep, DO    sodium chloride 0.9 % flush 10 mL, 10 mL, Intravenous, Q12H, Mummaneni, Sundeep, DO, 10 mL at 04/07/24 0834    sodium chloride 0.9 % flush 10 mL, 10 mL, Intravenous, PRN, Mummaneni, Sundeep, DO, 10 mL at 04/07/24 0835    sodium chloride 0.9 % infusion 40 mL, 40 mL, Intravenous, PRN, Mummaneni, Sundeep, DO    Review of Systems:   Review of Systems   Unobtainable  Vital Signs:  Temp:  [99.3 °F (37.4 °C)-101.6 °F (38.7 °C)] 99.3 °F (37.4 °C)  Heart Rate:  [] 114  Resp:  [16-19] 19  BP: (132-155)/(74-82) 132/74  Body mass index is 22.75 kg/m².     Intake/Output Summary (Last 24 hours) at 4/7/2024 0844  Last data filed at 4/6/2024 1104  Gross per 24 hour   Intake --   Output 450 ml   Net -450 ml     No intake/output data recorded.  Physical Exam:  Physical Exam   Lungs: Clear to  auscultation    Cardiac: Regular rhythm without murmurs    Abdomen: Soft, nontender, positive bowel sounds.  The PEG tube site was clean.    Results Review:   I have reviewed all of the patient's current test results    Results from last 7 days   Lab Units 04/06/24  0809 04/05/24 2310 04/02/24  0345   WBC 10*3/mm3 9.72 9.94 7.25   HEMOGLOBIN g/dL 11.0* 11.6* 10.8*   HEMATOCRIT % 34.1 34.3 32.2*   PLATELETS 10*3/mm3 280 304 234       Results from last 7 days   Lab Units 04/06/24  0809 04/05/24  2310 04/02/24  1441 04/02/24  0345   SODIUM mmol/L 140 140  --  139   POTASSIUM mmol/L 4.3 4.5 5.0 3.6   CHLORIDE mmol/L 104 102  --  103   CO2 mmol/L 24.0 23.0  --  25.0   BUN mg/dL 17 18  --  10   CREATININE mg/dL 0.40* 0.40*  --  0.30*   CALCIUM mg/dL 10.0 10.1  --  9.8   BILIRUBIN mg/dL 0.5 0.3  --   --    ALK PHOS U/L 86 86  --   --    ALT (SGPT) U/L 17 18  --   --    AST (SGOT) U/L 16 16  --   --    GLUCOSE mg/dL 111* 102*  --  104*       Results from last 7 days   Lab Units 04/06/24  0809   INR  0.99       Lab Results   Lab Value Date/Time    LIPASE 36 04/05/2024 2310    LIPASE 21 11/25/2023 1800    LIPASE 35 05/03/2022 0242    LIPASE 32 04/02/2022 2113    LIPASE 56 12/29/2021 2340    LIPASE 188 (H) 10/16/2021 0629    LIPASE 128 07/14/2017 0212       Radiology Review:  Imaging Results (Last 24 Hours)       Procedure Component Value Units Date/Time    XR Abdomen KUB [[id number]] Collected: 04/07/24 0408     Updated: 04/07/24 0413    Narrative:      EXAMINATION: XR ABDOMEN KUB-  4/7/2024 4:08 AM     HISTORY: Evaluate for gastric outlet obstruction.     FINDINGS: Repeat imaging following injection of Gastrografin through the  patient's indwelling PEG tube reveals there is progressive filling of  the colon which is nondistended. There is minimal residual contrast  within the small bowel. No retained contrast within the stomach. No  evidence of gastric outlet obstruction.       Impression:      1.. The majority of contrast  has now made its way into the colon with no  significant retained contrast within the small bowel or stomach.     This report was signed and finalized on 4/7/2024 4:10 AM by Dr. Yuniel De Jesus MD.       XR Abdomen KUB [[id number]] Collected: 04/06/24 1620     Updated: 04/06/24 1624    Narrative:      EXAMINATION: XR ABDOMEN KUB-  4/6/2024 4:20 PM     HISTORY: Evaluate GI tract. Gastrografin.     FINDINGS: KUB radiograph is obtained following administration of  Gastrografin. There is contrast noted within the small bowel and colon  without evidence of distention. A left-sided nephroureteral stent is in  place. No evidence of obstruction or free air.       Impression:      1. Contrast within both the small bowel and colon without evidence of  distention or obstruction.  2. Left-sided nephroureteral stent remains in place.     This report was signed and finalized on 4/6/2024 4:21 PM by Dr. Yuniel De Jesus MD.       XR Abdomen KUB [[id number]] Collected: 04/06/24 1047     Updated: 04/06/24 1056    Narrative:      XR ABDOMEN KUB- 4/6/2024 8:53 AM     HISTORY: workup for gastric outlet obstruction; K31.1-Adult hypertrophic  pyloric stenosis       TECHNIQUE:Single AP view of the abdomen.     COMPARISON: CT abdomen pelvis 4/6/2024     FINDINGS:     Multiple gas-filled distended loops of bowel are noted throughout the  abdomen. This is favored to represent large bowel. This is probably  similar compared to the prior CT.     Left percutaneous nephroureteral catheter is noted. Mojica catheter  overlies pelvis. Gastric tube is noted overlying the region of the  stomach.     No visualized free air.     No significant stool burden.     No acute osseous abnormality.       Impression:         Gas-filled distended loops of bowel diffusely throughout the abdomen is  favored to represent large bowel. This is probably similar compared to  the prior CT.      Gas distention of the stomach is poorly evaluated as compared to the  prior  CT.     Left percutaneous nephroureteral catheter.     Gastric tube bubble appears to overlie the stomach.     Mojica catheter is noted.                 This report was signed and finalized on 4/6/2024 10:53 AM by Amilcar Mckenna.               Impression/Plan:    Gastric outlet obstruction    Anoxic brain injury    Functional quadriplegia    Tracheostomy present    No evidence of gastric outlet obstruction on PEG tube injection and patient asymptomatic with a soft abdomen.  May start PEG tube feeding slowly and advance as tolerated per the primary/referring service.  Head of bed should be elevated greater than 40 degrees at all times during tube feedings.  The patient will need an upper endoscopy at some point possibly later this week.  The patient will be seen by a new inpatient gastroenterologist tomorrow to determine the timing of the upper endoscopy.  Please call with any questions or concerns.  Thank you    Robert Ramos MD  04/07/24  08:44 CDT    DISCLAIMER:    This physician works through a [employer] as an inpatient consultant gastroenterologist only and has no outpatient clinic for patient follow up.  Any results not available at time of inpatient discharge and/or GI clinic follow up should be managed by the hospitalist team, PCP, or outpatient gastroenterologist.

## 2024-04-07 NOTE — PLAN OF CARE
Goal Outcome Evaluation:  Plan of Care Reviewed With: spouse        Progress: no change  Outcome Evaluation: Patient alert, non-verbal. VSS. NSR-ST on telemetry. Home meds and tube feeding on hold until results of small bowel follow through are reviewed; spouse aware. Tolerating IV fluids. Safety maintained.

## 2024-04-07 NOTE — PROGRESS NOTES
[facility] Medicine Services  INPATIENT PROGRESS NOTE    Patient Name: Namita Zabala  Date of Admission: 4/5/2024  Today's Date: 04/07/24  Length of Stay: 1  Primary Care Physician: David Lara MD    Subjective   Chief Complaint: Abdominal pain  HPI   Time of examination patient is resting quietly in bed.  Patient is nonverbal.  Marked contractures over entirety of body.  No evidence of pain at time of exam.       Review of Systems   All pertinent negatives and positives are as above. All other systems have been reviewed and are negative unless otherwise stated.     Objective    Temp:  [99.3 °F (37.4 °C)-101.6 °F (38.7 °C)] 100.4 °F (38 °C)  Heart Rate:  [] 117  Resp:  [16-19] 18  BP: (132-155)/(74-82) 139/81  Physical Exam  Vitals and nursing note reviewed.   Constitutional:       Comments: Currently resting quietly.  Eyes open.  No grimacing    HENT:      Head: Normocephalic.      Right Ear: External ear normal.      Left Ear: External ear normal.      Nose: Nose normal.   Eyes:      Pupils: Pupils are equal, round, and reactive to light.   Neck:      Comments: Contractured  Cardiovascular:      Rate and Rhythm: Normal rate and regular rhythm.      Pulses: Normal pulses.      Heart sounds: Normal heart sounds.   Pulmonary:      Effort: Pulmonary effort is normal.      Breath sounds: Normal breath sounds.      Comments: Trache collar.  O2 6l  Abdominal:      General: Bowel sounds are normal. There is no distension.      Palpations: Abdomen is soft.      Tenderness: There is no abdominal tenderness.      Comments: Peg tube present   Musculoskeletal:         General: Deformity present.      Comments: Contractured   Skin:     General: Skin is warm and dry.      Capillary Refill: Capillary refill takes less than 2 seconds.   Neurological:      Mental Status: Mental status is at baseline.             Results Review:  I have reviewed the labs, radiology  "results, and diagnostic studies.    Laboratory Data:   Results from last 7 days   Lab Units 04/06/24  0809 04/05/24  2310 04/02/24  0345   WBC 10*3/mm3 9.72 9.94 7.25   HEMOGLOBIN g/dL 11.0* 11.6* 10.8*   HEMATOCRIT % 34.1 34.3 32.2*   PLATELETS 10*3/mm3 280 304 234        Results from last 7 days   Lab Units 04/06/24  0809 04/05/24  2310 04/02/24  1441 04/02/24  0345   SODIUM mmol/L 140 140  --  139   POTASSIUM mmol/L 4.3 4.5 5.0 3.6   CHLORIDE mmol/L 104 102  --  103   CO2 mmol/L 24.0 23.0  --  25.0   BUN mg/dL 17 18  --  10   CREATININE mg/dL 0.40* 0.40*  --  0.30*   CALCIUM mg/dL 10.0 10.1  --  9.8   BILIRUBIN mg/dL 0.5 0.3  --   --    ALK PHOS U/L 86 86  --   --    ALT (SGPT) U/L 17 18  --   --    AST (SGOT) U/L 16 16  --   --    GLUCOSE mg/dL 111* 102*  --  104*       Culture Data:   No results found for: \"BLOODCX\", \"URINECX\", \"WOUNDCX\", \"MRSACX\", \"RESPCX\", \"STOOLCX\"    Radiology Data:   Imaging Results (Last 24 Hours)       Procedure Component Value Units Date/Time    XR Abdomen KUB [[id number]] Collected: 04/07/24 0408     Updated: 04/07/24 0413    Narrative:      EXAMINATION: XR ABDOMEN KUB-  4/7/2024 4:08 AM     HISTORY: Evaluate for gastric outlet obstruction.     FINDINGS: Repeat imaging following injection of Gastrografin through the  patient's indwelling PEG tube reveals there is progressive filling of  the colon which is nondistended. There is minimal residual contrast  within the small bowel. No retained contrast within the stomach. No  evidence of gastric outlet obstruction.       Impression:      1.. The majority of contrast has now made its way into the colon with no  significant retained contrast within the small bowel or stomach.     This report was signed and finalized on 4/7/2024 4:10 AM by Dr. Yuniel De Jesus MD.       XR Abdomen KUB [[id number]] Collected: 04/06/24 1620     Updated: 04/06/24 1624    Narrative:      EXAMINATION: XR ABDOMEN KUB-  4/6/2024 4:20 PM     HISTORY: Evaluate GI " tract. Gastrografin.     FINDINGS: KUB radiograph is obtained following administration of  Gastrografin. There is contrast noted within the small bowel and colon  without evidence of distention. A left-sided nephroureteral stent is in  place. No evidence of obstruction or free air.       Impression:      1. Contrast within both the small bowel and colon without evidence of  distention or obstruction.  2. Left-sided nephroureteral stent remains in place.     This report was signed and finalized on 4/6/2024 4:21 PM by Dr. Yuniel De Jesus MD.               I have reviewed the patient's current medications.     Assessment/Plan   Assessment  Active Hospital Problems    Diagnosis     **Gastric outlet obstruction     Anoxic brain injury     Functional quadriplegia     Tracheostomy present        Treatment Plan  GI recommends EGD   May start using PEG for feeding.  Have nutritionist consult for daily calorie needs.   Elevate HOB 40 degrees at all times when tube feeding.  Bmp in AM    Medical Decision Making  Number and Complexity of problems:   Gastric outlet obstruction high complexity  Anoxic brain injury high complexity  Functional quadriplegia high complexity  Tracheostomy moderate complexity  Differential Diagnosis:     Conditions and Status        Condition is unchanged.     MDM Data  External documents reviewed: Reviewed  Cardiac tracing (EKG, telemetry) interpretation: Reviewed  Radiology interpretation: Reviewed  Labs reviewed: Reviewed  Any tests that were considered but not ordered: None     Decision rules/scores evaluated (example [id number], Wells, etc): None     Discussed with: Nursing staff     Care Planning  Shared decision making: GI and general surgery  Code status and discussions: DNR/DNI    Disposition  Social Determinants of Health that impact treatment or disposition: None  I expect the patient to be discharged to NH in 1-2 days.     Electronically signed by Renetta Montana DO, 04/07/24, 11:07  CDT.

## 2024-04-07 NOTE — PLAN OF CARE
Goal Outcome Evaluation:  Plan of Care Reviewed With: family        Progress: no change     Pt's eyes open spontaneously, pt is nonverbal. Pt does not appear to be in pain. Pt has family at bedside. Pt unable to answer questions or follow commands. Pt has IV to right hand infusing LR @75ml/hr. Pt has F/C and urostomy in place. Mojica care completed. Pt has tele-NST. Pt has trach collar with O2 @6L. Pt has had temperature, physician aware and blood cultures ordered. Nutrition consult placed. Pt still has NPO. Pt has Gtube that we are not currently using at this time. Bed in low position, bed alarm on, call light in reach, safety maintained.

## 2024-04-08 VITALS
HEIGHT: 60 IN | HEART RATE: 80 BPM | RESPIRATION RATE: 18 BRPM | OXYGEN SATURATION: 100 % | WEIGHT: 116.5 LBS | SYSTOLIC BLOOD PRESSURE: 133 MMHG | BODY MASS INDEX: 22.87 KG/M2 | DIASTOLIC BLOOD PRESSURE: 81 MMHG | TEMPERATURE: 98.1 F

## 2024-04-08 LAB
ANION GAP SERPL CALCULATED.3IONS-SCNC: 18 MMOL/L (ref 5–15)
BUN SERPL-MCNC: 23 MG/DL (ref 6–20)
BUN/CREAT SERPL: 62.2 (ref 7–25)
CALCIUM SPEC-SCNC: 9.6 MG/DL (ref 8.6–10.5)
CHLORIDE SERPL-SCNC: 104 MMOL/L (ref 98–107)
CO2 SERPL-SCNC: 18 MMOL/L (ref 22–29)
CREAT SERPL-MCNC: 0.37 MG/DL (ref 0.57–1)
EGFRCR SERPLBLD CKD-EPI 2021: 126.1 ML/MIN/1.73
GLUCOSE BLDC GLUCOMTR-MCNC: 129 MG/DL (ref 70–130)
GLUCOSE BLDC GLUCOMTR-MCNC: 67 MG/DL (ref 70–130)
GLUCOSE BLDC GLUCOMTR-MCNC: 73 MG/DL (ref 70–130)
GLUCOSE SERPL-MCNC: 65 MG/DL (ref 65–99)
MAGNESIUM SERPL-MCNC: 2.1 MG/DL (ref 1.6–2.6)
PHOSPHATE SERPL-MCNC: 3.2 MG/DL (ref 2.5–4.5)
POTASSIUM SERPL-SCNC: 3.8 MMOL/L (ref 3.5–5.2)
SODIUM SERPL-SCNC: 140 MMOL/L (ref 136–145)

## 2024-04-08 PROCEDURE — 94799 UNLISTED PULMONARY SVC/PX: CPT

## 2024-04-08 PROCEDURE — 82948 REAGENT STRIP/BLOOD GLUCOSE: CPT

## 2024-04-08 PROCEDURE — 83735 ASSAY OF MAGNESIUM: CPT | Performed by: FAMILY MEDICINE

## 2024-04-08 PROCEDURE — 84100 ASSAY OF PHOSPHORUS: CPT | Performed by: FAMILY MEDICINE

## 2024-04-08 PROCEDURE — 99233 SBSQ HOSP IP/OBS HIGH 50: CPT | Performed by: INTERNAL MEDICINE

## 2024-04-08 PROCEDURE — 25010000002 ERTAPENEM PER 500 MG: Performed by: FAMILY MEDICINE

## 2024-04-08 PROCEDURE — 80048 BASIC METABOLIC PNL TOTAL CA: CPT | Performed by: FAMILY MEDICINE

## 2024-04-08 RX ORDER — SULFAMETHOXAZOLE AND TRIMETHOPRIM 200; 40 MG/5ML; MG/5ML
20 SUSPENSION ORAL 2 TIMES DAILY
Qty: 280 ML | Refills: 0 | Status: SHIPPED | OUTPATIENT
Start: 2024-04-08 | End: 2024-04-15

## 2024-04-08 RX ORDER — BACLOFEN 20 MG/1
10 TABLET ORAL 3 TIMES DAILY
Start: 2024-04-08

## 2024-04-08 RX ADMIN — Medication 1 TABLET: at 08:59

## 2024-04-08 RX ADMIN — BACLOFEN 20 MG: 10 TABLET ORAL at 13:25

## 2024-04-08 RX ADMIN — PANTOPRAZOLE SODIUM 40 MG: 40 INJECTION, POWDER, FOR SOLUTION INTRAVENOUS at 09:20

## 2024-04-08 RX ADMIN — BACLOFEN 20 MG: 10 TABLET ORAL at 08:59

## 2024-04-08 RX ADMIN — ERTAPENEM 1000 MG: 1 INJECTION INTRAMUSCULAR; INTRAVENOUS at 09:29

## 2024-04-08 RX ADMIN — BACLOFEN 20 MG: 10 TABLET ORAL at 00:31

## 2024-04-08 RX ADMIN — POLYETHYLENE GLYCOL 3350 17 G: 17 POWDER, FOR SOLUTION ORAL at 08:59

## 2024-04-08 RX ADMIN — DOCUSATE SODIUM AND SENNOSIDES 2 TABLET: 8.6; 5 TABLET, FILM COATED ORAL at 08:59

## 2024-04-08 NOTE — CASE MANAGEMENT/SOCIAL WORK
Discharge Planning Assessment  [facility]     Patient Name: Namita Zabala  MRN: [id number]  Today's Date: 4/8/2024    Admit Date: 4/5/2024        Discharge Needs Assessment       Row Name 04/08/24 0823       Living Environment    People in Home facility resident    [facility]    Current Living Arrangements extended care facility    Potentially Unsafe Housing Conditions none    In the past 12 months has the electric, gas, oil, or water company threatened to shut off services in your home? No    Provides Primary Care For no one    Quality of Family Relationships supportive;helpful;involved    Able to Return to Prior Arrangements yes       Resource/Environmental Concerns    Resource/Environmental Concerns none    Transportation Concerns none       Transportation Needs    In the past 12 months, has lack of transportation kept you from medical appointments or from getting medications? no    In the past 12 months, has lack of transportation kept you from meetings, work, or from getting things needed for daily living? No       Food Insecurity    Within the past 12 months, you worried that your food would run out before you got the money to buy more. Never true    Within the past 12 months, the food you bought just didn't last and you didn't have money to get more. Never true       Transition Planning    Patient/Family Anticipates Transition to long-term care facility    Patient/Family Anticipated Services at Transition skilled nursing    Transportation Anticipated health plan transportation       Discharge Needs Assessment    Current Outpatient/Agency/Support Group skilled nursing facility    Concerns to be Addressed discharge planning    Anticipated Changes Related to Illness none    Equipment Needed After Discharge none    Outpatient/Agency/Support Group Needs skilled nursing facility    Discharge Facility/Level of Care Needs nursing facility, skilled    Discharge Coordination/Progress  Patient was admitted from [facility].  Patient has a bed hold and will plan on patient returning to this facility upon discharge.                   Discharge Plan    No documentation.                 Continued Care and Services - Admitted Since 4/5/2024    No active coordination exists for this encounter.       Selected Continued Care - Prior Encounters Includes continued care and service providers with selected services from prior encounters from 1/6/2024 to 4/8/2024      Discharged on 4/2/2024 Admission date: 3/27/2024 - Discharge disposition: Skilled Nursing Facility (DC - External)      Destination       Service Provider Selected Services Address Phone Fax Patient Preferred    [facility] [address], [facility] [address] [phone] --                      Discharged on 2/19/2024 Admission date: 2/7/2024 - Discharge disposition: Intermediate Care       Destination       Service Provider Selected Services Address Phone Fax Patient Preferred    [facility] [address] [facility] [facility] [address] [phone] --                             Demographic Summary    No documentation.                  Functional Status    No documentation.                  Psychosocial    No documentation.                  Abuse/Neglect    No documentation.                  Legal    No documentation.                  Substance Abuse    No documentation.                  Patient Forms    No documentation.                     CAMILA Gonzalez

## 2024-04-08 NOTE — DISCHARGE SUMMARY
[facility] Medicine Services  DISCHARGE SUMMARY       Date of Admission: 4/5/2024  Date of Discharge:  4/8/2024  Primary Care Physician: David Lara MD    Discharge Diagnoses:  Active Hospital Problems    Diagnosis     **Gastric outlet obstruction     Anoxic brain injury     Functional quadriplegia     Tracheostomy present          Presenting Problem/History of Present Illness:  Gastric outlet obstruction [K31.1]     Chief Complaint on Day of Discharge:   No complaint    History of Present Illness on Day of Discharge:   There is no obvious evidence of abdominal discomfort today. Tube feedings have been restarted by general surgery and the patient is tolerating tube feedings well. Gastrografin swallow notes that the majority of contrast as proceeded into the colon with no evidence of retained contrast within the small bowel or stomach.  The patient is appropriate for discharge back to skilled nursing facility today.  Urine culture has finalized.  There were 3 organisms present.  All 3 are sensitive to Bactrim.  IV antibiotics are to be discontinued and the patient will be transitioned to liquid Bactrim through PEG tube.    Hospital Course  Patient was sent from nursing home due to significant abdominal discomfort. Found to be tachycardic and in acute discomfort on arrival to the ED and given multiple doses of pain medications as well as evaluation of gastric tube and further imaging was done. A gastric outlet obstruction was found and discussion was had with the [facility]'s gastroenterologist on site by the ER provider and they suggested admission for possible endoscopy and treatment. At this time we are asked to admit and start patient on IV fluids and monitoring.   Number and Complexity of problems: High  Differential Diagnosis:   # Gastric outlet obstruction  - Admit to inpatient  - Remote telemetry  - Pain control as tolerated  - LR at 100 cc/h will give another bolus  now  - Small bowel follow-through  - GI consult, appreciate recommendations and assistance in care  - Continue n.p.o.  - General surgery consultation in the morning was suggested by GI, will place for morning consultation     Restart home medications for stable chronic medical concerns once GJ tube is working and gastric outlet obstruction is fixed.     # Sinus tachycardia  - Likely secondary to a combination of pain and dehydration will give IV fluids and pain control as tolerated     Patient has been on Invanz for significant UTI with pyelonephritis will continue this for now    PEG tube injection was performed with Gastrografin.  Contrast passed into the small bowel and colon without any retained contrast.  The patient had no evidence of pain on the day after admission.  Patient was resting quietly in bed with no grimacing and no tachycardia.  She was noted to be markedly contracted in all 4 extremities.  There are no plans for endoscopic evaluation.  PEG tube feeding was resumed on 4/7.  Nutrition was consulted for tube feeding recommendations at that was initiated.  Patient has tolerated tube feeding advancement and is doing well today.  She is appropriate for discharge to skilled facility.      Consults:   General surgery:    Patient with anoxic brain injury with quadriplegia, causes unknown.  I do not know her DNR   The patient had no evidence of pain on the day after admission.status, longstanding G-tube in place with gastric distention at present.  As noted above would have gastroenterology see and evaluate consider endoscopy and evaluation of the pylorus, if the pylorus is narrowed would suggest a biopsy of this area, and dilatation, potentially placement under direct visualization of a MOST tube that would go into the duodenum to further feed her if the stomach is occluded.  Given her situation I think a MOST tube through the pylorus would be my suggestion if there was a obstruction will check H. pylori  "and treat her for peptic ulcer disease absolutely no plan for any surgical intervention if surgery is needed would refer her to a tertiary center thank you for this consultation no other suggestions and will sign off.     [] X-Ray  [] Reviewed Records  [] Called Physician/Consulted     Electronically signed by Don Woodward MD    Gastroenterology:  Impression/Plan:    Gastric outlet obstruction        Gastric distention on CT scan with PEG tube in place on CT scan.  Possibilities include balloon occlusion of the pylorus which is now resolved.  In any event, would recommend a Gastrografin injection with abdominal x-ray through the PEG tube to rule out gastric outlet obstruction.  If there is evidence of obstruction radiologically, an upper endoscopy would be warranted and general surgical consultation.  However if she shows a patent pylorus with no gastric outlet obstruction may resume PEG tube feedings per the primary/referring service with head of bed elevated greater than 40 degrees at all times.  The above was discussed in detail with the patient's nursing staff and she agreed.  Further recommendations based on the results of the above studies and the patient's clinical course.  If the patient should have any further emesis, would recommend placing the PEG tube to dependent drainage or low intermittent suction.  Thank you     Robert Ramos MD    Result Review    Result Review:  I have personally reviewed the results from the time of this admission to 4/8/2024 12:57 CDT and agree with these findings:  []  Laboratory  []  Microbiology  []  Radiology  []  EKG/Telemetry   []  Cardiology/Vascular   []  Pathology  []  Old records  []  Other:    Condition on Discharge:    Improved and appropriate for discharge    Physical Exam on Discharge:  /81 (BP Location: Right leg, Patient Position: Lying)   Pulse 80   Temp 98.1 °F (36.7 °C) (Axillary)   Resp 18   Ht 152.4 cm (60\")   Wt 52.8 kg (116 lb 8 oz)   LMP  " (LMP Unknown)   SpO2 100%   BMI 22.75 kg/m²   Physical Exam     Constitutional:       Comments: Currently resting quietly.  Eyes open.  No grimacing    HENT:      Head: Normocephalic.      Right Ear: External ear normal.      Left Ear: External ear normal.      Nose: Nose normal.   Eyes:      Pupils: Pupils are equal, round, and reactive to light.   Neck:      Comments: Contracted  Cardiovascular:      Rate and Rhythm: Normal rate and regular rhythm.      Pulses: Normal pulses.      Heart sounds: Normal heart sounds.   Pulmonary:      Effort: Pulmonary effort is normal.      Breath sounds: Normal breath sounds.      Comments: Trach collar.  O2 6l  Abdominal:      General: Bowel sounds are normal. There is no distension.      Palpations: Abdomen is soft.      Tenderness: There is no abdominal tenderness.      Comments: Peg tube present   Musculoskeletal:         General: Deformity present.      Comments: Contractures of all 4 extremities including feet, toes, wrists, hands and fingers.  Skin:     General: Skin is warm and dry.   Neurological:      Mental Status: Mental status is at baseline.       Discharge Disposition:  Skilled Nursing Facility (DC - External)    Discharge Medications:     Discharge Medications        New Medications        Instructions Start Date   sulfamethoxazole-trimethoprim 200-40 MG/5ML suspension  Commonly known as: BACTRIM,SEPTRA   20 mL, Oral, 2 Times Daily             Changes to Medications        Instructions Start Date   baclofen 20 MG tablet  Commonly known as: LIORESAL  What changed:   how much to take  when to take this   10 mg, Per G Tube, 3 Times Daily             Continue These Medications        Instructions Start Date   acetaminophen 500 MG tablet  Commonly known as: TYLENOL   500 mg, Per G Tube, Every 4 Hours PRN, Not to exceed 3000mg from all sources daily       bisacodyl 10 MG suppository  Commonly known as: DULCOLAX   10 mg, Rectal, Every 48 Hours PRN       carboxymethylcellulose 0.5 % solution  Commonly known as: REFRESH PLUS   1 drop, Both Eyes, 2 Times Daily      cetirizine 10 MG tablet  Commonly known as: zyrTEC   10 mg, Oral, Daily      chlorhexidine 0.12 % solution  Commonly known as: PERIDEX   15 mL, Mouth/Throat, 2 Times Daily, 15 ml using oral swab twice daily for oral care      fleet enema 7-19 GM/118ML enema   1 enema, Rectal, Every 48 Hours PRN      fluticasone 50 MCG/ACT nasal spray  Commonly known as: FLONASE   2 sprays, Nasal, Every Morning      guaifenesin 100 MG/5ML liquid  Commonly known as: ROBITUSSIN   600 mg, Per G Tube, 2 Times Daily      hydrOXYzine pamoate 25 MG capsule  Commonly known as: VISTARIL   25 mg, Per G Tube, Every 6 Hours PRN      ipratropium-albuterol 0.5-2.5 mg/3 ml nebulizer  Commonly known as: DUO-NEB   3 mL, Nebulization, Every 4 Hours PRN      liver oil-zinc oxide 40 % ointment  Commonly known as: DESITIN   1 application , Topical, Every 4 Hours PRN      miconazole 2 % powder  Commonly known as: MICOTIN   1 Application, Topical, 2 Times Daily PRN, Apply to groin or gluteal folds for rash, itching, redness and/or irritation       MiraLax 17 g packet  Generic drug: polyethylene glycol   17 g, Oral, Daily      multivitamin with minerals tablet tablet   1 tablet, Oral, Every Morning      nystatin 321005 UNIT/GM powder  Commonly known as: MYCOSTATIN   1 Application, Topical, 2 Times Daily, To bilateral bendarms, left abdominal fold, and under left breast       ondansetron 4 MG/5ML solution  Commonly known as: ZOFRAN   4 mg, Oral, Every 6 Hours PRN      pantoprazole 2 mg/mL suspension suspension  Commonly known as: PROTONIX   40 mg, Per G Tube, 2 Times Daily      Renacidin solution   60 mL, Irrigation, 2 Times Daily, Irrigate stratton catheter       rosuvastatin 5 MG tablet  Commonly known as: CRESTOR   5 mg, Per G Tube, Nightly      Scopolamine 1 MG/3DAYS patch   1 patch, Transdermal, Every 72 Hours      Senna Plus 8.6-50 MG per  tablet  Generic drug: sennosides-docusate   2 tablets, Oral, 2 Times Daily      simethicone 40 MG/0.6ML drops  Commonly known as: MYLICON   20 mg, Per G Tube, Every 6 Hours      sodium chloride 0.9 % irrigation  Commonly known as: NS   10 mL, Irrigation, Every 8 Hours, Irrigation for secretions: Hydration             Stop These Medications      ertapenem 1,000 mg in sodium chloride 0.9 % 100 mL IVPB     HYDROcodone-acetaminophen 5-325 MG per tablet  Commonly known as: NORCO     tiZANidine 4 MG tablet  Commonly known as: ZANAFLEX              Discharge Diet:   Diet Instructions       Diet: Tube Feeding, Nothing By Mouth; Continuous; Peptamen 1.5 at 50 mL/h with daily volume of 1100 cc.  35 cc water flush every hour      Discharge Diet:  Tube Feeding  Nothing By Mouth       Feeding Type: Continuous    Formula & Rate: Peptamen 1.5 at 50 mL/h with daily volume of 1100 cc.  35 cc water flush every hour            Discharge Care Plan / Instructions:   Discharge to SNF    Activity at Discharge:   Activity Instructions       Activity as Tolerated              Follow-up Appointments:  Follow-up with facility physician as directed    Electronically signed by Mik Meeks DO, 04/08/24, 12:57 CDT.    Time: Discharge over 30 min    Part of this note may be an electronic transcription/translation of spoken language to printed text using the Dragon Dictation system.

## 2024-04-08 NOTE — PLAN OF CARE
Admitted 4/5 for poss gastric outlet obstruction; seen by Gen surg (no sx indicated), GI (poss upper endo in near future); no obstruction per GI  VAT consulted for poor IV access; mult staff including CH attempted without success; IVF LR @ 100 when IV restarted  SR/ST     Problem: Adult Inpatient Plan of Care  Goal: Absence of Hospital-Acquired Illness or Injury  Intervention: Identify and Manage Fall Risk  Recent Flowsheet Documentation  Taken 4/8/2024 0300 by Scotty Ramsey RN  Safety Promotion/Fall Prevention: safety round/check completed  Taken 4/8/2024 0200 by Scotty Ramsey RN  Safety Promotion/Fall Prevention: safety round/check completed  Taken 4/8/2024 0100 by Scotty Ramsey RN  Safety Promotion/Fall Prevention: safety round/check completed  Taken 4/8/2024 0000 by Scotty Ramsey RN  Safety Promotion/Fall Prevention: safety round/check completed  Taken 4/7/2024 2300 by Scotty Ramsey RN  Safety Promotion/Fall Prevention: safety round/check completed  Taken 4/7/2024 2200 by Scotty Ramsey RN  Safety Promotion/Fall Prevention: safety round/check completed  Taken 4/7/2024 2100 by Scotty Ramsey RN  Safety Promotion/Fall Prevention: safety round/check completed  Taken 4/7/2024 2000 by Scotty Ramsey RN  Safety Promotion/Fall Prevention: safety round/check completed     Problem: Adult Inpatient Plan of Care  Goal: Absence of Hospital-Acquired Illness or Injury  Intervention: Identify and Manage Fall Risk  Recent Flowsheet Documentation  Taken 4/8/2024 0300 by Scotty Ramsey RN  Safety Promotion/Fall Prevention: safety round/check completed  Taken 4/8/2024 0200 by Scotty Ramsey RN  Safety Promotion/Fall Prevention: safety round/check completed  Taken 4/8/2024 0100 by Scotty Ramsey RN  Safety Promotion/Fall Prevention: safety round/check completed  Taken 4/8/2024 0000 by Scotty Ramsey RN  Safety Promotion/Fall Prevention: safety round/check completed  Taken 4/7/2024 2300 by Scotty Ramsey RN  Safety Promotion/Fall Prevention:  safety round/check completed  Taken 4/7/2024 2200 by Scotty Ramsey, RN  Safety Promotion/Fall Prevention: safety round/check completed  Taken 4/7/2024 2100 by Scotty Ramsey, RN  Safety Promotion/Fall Prevention: safety round/check completed  Taken 4/7/2024 2000 by Scotty Ramsey, RN  Safety Promotion/Fall Prevention: safety round/check completed     Problem: Adult Inpatient Plan of Care  Goal: Plan of Care Review  Outcome: Ongoing, Progressing  Flowsheets (Taken 4/8/2024 0349)  Plan of Care Reviewed With: patient   Goal Outcome Evaluation:  Plan of Care Reviewed With: patient

## 2024-04-08 NOTE — CASE MANAGEMENT/SOCIAL WORK
Continued Stay Note  [facility]     Patient Name: Namita Zabaal  MRN: [id number]  Today's Date: 4/8/2024    Admit Date: 4/5/2024    Plan: [facility]   Discharge Plan       Row Name 04/08/24 1317       Plan    Plan [facility]    Plan Comments SW has informed Awilda, with [facility] admissions of patient's discharge for today.    Final Discharge Disposition Code 03 - skilled nursing facility (SNF)                   Discharge Codes    No documentation.                 Expected Discharge Date and Time       Expected Discharge Date Expected Discharge Time    Apr 8, 2024               ACMILA Gonzalez

## 2024-04-08 NOTE — PROGRESS NOTES
[facility] Gastroenterology  Inpatient Progress Note  Today's date:  04/08/24    Namita Zabala  1975       Reason for Follow Up: Gastric distention noted on admission imaging    Subjective:   Discussed with patient's nurse in detail.  Able to tolerate tube feeds without any residual.  Patient is nonverbal    Allergies   Allergen Reactions    Coconut Unknown - High Severity    Levaquin [Levofloxacin] Other (See Comments)     unknown    Nuts Unknown - High Severity    Penicillins Rash     Patient's father noted patient had taken penicillin, many years ago, many times and then started developing a rash when she would take it.  She has received cefepime, ceftriaxone and cephalexin with no known adverse problems    [place] Other (See Comments)     Causes migraines per pt reports       Current Facility-Administered Medications:     acetaminophen (TYLENOL) tablet 650 mg, 650 mg, Oral, Q4H PRN **OR** acetaminophen (TYLENOL) 160 MG/5ML oral solution 650 mg, 650 mg, Oral, Q4H PRN **OR** acetaminophen (TYLENOL) suppository 650 mg, 650 mg, Rectal, Q4H PRN, Carter Kellogg, DO, 650 mg at 04/07/24 1625    baclofen (LIORESAL) tablet 20 mg, 20 mg, Per G Tube, Q6H, Renetta Montana DO, 20 mg at 04/08/24 1325    sennosides-docusate (PERICOLACE) 8.6-50 MG per tablet 2 tablet, 2 tablet, Oral, BID PRN **AND** polyethylene glycol (MIRALAX) packet 17 g, 17 g, Oral, Daily PRN **AND** bisacodyl (DULCOLAX) EC tablet 5 mg, 5 mg, Oral, Daily PRN **AND** bisacodyl (DULCOLAX) suppository 10 mg, 10 mg, Rectal, Daily PRN, Valdez, Carter, DO    Calcium Replacement - Follow Nurse / BPA Driven Protocol, , Does not apply, PRN, Sergeeni, Carter, DO    ertapenem (INVanz) 1,000 mg in sodium chloride 0.9 % 100 mL MBP, 1,000 mg, Intravenous, Q24H, Renetta Montana DO, Last Rate: 200 mL/hr at 04/08/24 0929, 1,000 mg at 04/08/24 0929    HYDROcodone-acetaminophen (NORCO) 5-325 MG per tablet 1 tablet, 1 tablet, Oral, Q4H  PRN, Renetta Montana DO    HYDROmorphone (DILAUDID) injection 0.5 mg, 0.5 mg, Intravenous, Q2H PRN, Renetta Montana DO    hydrOXYzine (ATARAX) tablet 25 mg, 25 mg, Per G Tube, Q6H PRN, Renetta Montana DO    lactated ringers infusion, 100 mL/hr, Intravenous, Continuous, Carter Kellogg DO, Last Rate: 100 mL/hr at 04/07/24 1841, 100 mL/hr at 04/07/24 1841    Magnesium Low Dose Replacement - Follow Nurse / BPA Driven Protocol, , Does not apply, PRN, Valdez Carter, DO    multivitamin with minerals 1 tablet, 1 tablet, Oral, Daily, Renetta Montana DO, 1 tablet at 04/08/24 0859    [DISCONTINUED] Morphine sulfate (PF) injection 1 mg, 1 mg, Intravenous, Q4H PRN, 1 mg at 04/06/24 1525 **AND** naloxone (NARCAN) injection 0.4 mg, 0.4 mg, Intravenous, Q5 Min PRN, Carter Kellogg, DO    [DISCONTINUED] Morphine sulfate (PF) injection 2 mg, 2 mg, Intravenous, Q4H PRN, 2 mg at 04/07/24 1457 **AND** naloxone (NARCAN) injection 0.4 mg, 0.4 mg, Intravenous, Q5 Min PRN, Hubermankasi Carter, DO    nitroglycerin (NITROSTAT) SL tablet 0.4 mg, 0.4 mg, Sublingual, Q5 Min PRN, Sergeeni Carter, DO    ondansetron ODT (ZOFRAN-ODT) disintegrating tablet 4 mg, 4 mg, Oral, Q6H PRN **OR** ondansetron (ZOFRAN) injection 4 mg, 4 mg, Intravenous, Q6H PRN, Valdez Sundeep, DO    pantoprazole (PROTONIX) injection 40 mg, 40 mg, Intravenous, Q12H, Robert Ramos MD, 40 mg at 04/08/24 0920    Phosphorus Replacement - Follow Nurse / BPA Driven Protocol, , Does not apply, PRN, Valdez Sundeep, DO    polyethylene glycol (MIRALAX) packet 17 g, 17 g, Oral, Daily, Renetta Montana DO, 17 g at 04/08/24 0859    Potassium Replacement - Follow Nurse / BPA Driven Protocol, , Does not apply, PRN, Valdez, Sundeep, DO    rosuvastatin (CRESTOR) tablet 5 mg, 5 mg, Per G Tube, Nightly, Renetta Montana DO, 5 mg at 04/07/24 2134    sennosides-docusate (PERICOLACE) 8.6-50 MG per tablet 2 tablet, 2 tablet, Oral, BID, Horn,  Renetta Hines, , 2 tablet at 04/08/24 0859    sodium chloride 0.9 % flush 10 mL, 10 mL, Intravenous, Q12H, [facility], Sundeep, DO, 10 mL at 04/07/24 0834    sodium chloride 0.9 % flush 10 mL, 10 mL, Intravenous, PRN, Mummaneni, Sundeep, DO, 10 mL at 04/07/24 0835    sodium chloride 0.9 % infusion 40 mL, 40 mL, Intravenous, PRN, [facility], Sundeep, DO    Review of Systems:   Review of Systems     Vital Signs:  Temp:  [98 °F (36.7 °C)-101 °F (38.3 °C)] 98.1 °F (36.7 °C)  Heart Rate:  [] 80  Resp:  [18-20] 18  BP: (121-135)/(69-81) 133/81  Body mass index is 22.75 kg/m².     Intake/Output Summary (Last 24 hours) at 4/8/2024 1540  Last data filed at 4/8/2024 0327  Gross per 24 hour   Intake 50 ml   Output 1225 ml   Net -1175 ml     No intake/output data recorded.  Physical Exam:  Physical Exam     Patient is not alert or oriented.  Arousable  HEENT: Trach noted.  Heart: S1, S2  Abdomen: Soft, gastrostomy tube noted.  Extremities: Diffuse contractures noted    Results Review:   I have reviewed all of the patient's current test results    Results from last 7 days   Lab Units 04/06/24  0809 04/05/24  2310 04/02/24  0345   WBC 10*3/mm3 9.72 9.94 7.25   HEMOGLOBIN g/dL 11.0* 11.6* 10.8*   HEMATOCRIT % 34.1 34.3 32.2*   PLATELETS 10*3/mm3 280 304 234       Results from last 7 days   Lab Units 04/08/24  0725 04/06/24  0809 04/05/24  2310   SODIUM mmol/L 140 140 140   POTASSIUM mmol/L 3.8 4.3 4.5   CHLORIDE mmol/L 104 104 102   CO2 mmol/L 18.0* 24.0 23.0   BUN mg/dL 23* 17 18   CREATININE mg/dL 0.37* 0.40* 0.40*   CALCIUM mg/dL 9.6 10.0 10.1   BILIRUBIN mg/dL  --  0.5 0.3   ALK PHOS U/L  --  86 86   ALT (SGPT) U/L  --  17 18   AST (SGOT) U/L  --  16 16   GLUCOSE mg/dL 65 111* 102*       Results from last 7 days   Lab Units 04/06/24  0809   INR  0.99       Lab Results   Lab Value Date/Time    LIPASE 36 04/05/2024 2310    LIPASE 21 11/25/2023 1800    LIPASE 35 05/03/2022 0242    LIPASE 32 04/02/2022 2113    LIPASE 56  12/29/2021 2340    LIPASE 188 (H) 10/16/2021 0629    LIPASE 128 07/14/2017 0212       Radiology Review:  Imaging Results (Last 24 Hours)       ** No results found for the last 24 hours. **            Impression/Plan:    Gastric outlet obstruction    Anoxic brain injury    Functional quadriplegia    Tracheostomy present  48-year-old female with history of anoxic brain injury, quadriplegia with contractures who had CT scan of the abdomen showed evidence of changes concerning for gastric distention.  However, follow-up imaging with upper GI series did not show any evidence of obstructive features and contrast was noted to be passing through without any difficulty into the small bowel and colon.  At this time, given the lack of any thickening or mucosal abnormalities noted on upper GI series, I would defer endoscopic evaluation.  It is reassuring that she is able to tolerate tube feeds without any residual.  Will continue to follow the patient as needed.    Thank you for allowing us to participate in the care of your patient.        Filiberto Rankin MD  04/08/24  15:40 CDT    DISCLAIMER:    This physician works through a [employer] as an inpatient consultant gastroenterologist only and has no outpatient clinic for patient follow up.  Any results not available at time of inpatient discharge and/or GI clinic follow up should be managed by the hospitalist team, PCP, or outpatient gastroenterologist.

## 2024-04-08 NOTE — PLAN OF CARE
Goal Outcome Evaluation:  Plan of Care Reviewed With: spouse        Progress: no change       Discharge orders placed. IV to right wrist removed, no excess bleeding noted, pt tolerated well. Pt nonverbal, but seems comfortable. Pt has Peptamen infusing to Gtube at 20ml/hr with a flush of 35ml every hour. Will continue TF until ambulance is here to transport pt back to [facility].

## 2024-04-08 NOTE — PLAN OF CARE
Goal Outcome Evaluation:  Plan of Care Reviewed With: caregiver        Progress: no change  Outcome Evaluation: RDN consult for enteral ntn. Pt has peg tube in place .Initiate Peptamen 1.5 at 20 ml/hr x 22 hrs, then increase by 10 ml/hr every 12 as tolerated to goal rate of 50 ml/hr. Water flush of 35 ml/hr. Cont to follow for plan of care.

## 2024-04-12 LAB
BACTERIA SPEC AEROBE CULT: NORMAL
BACTERIA SPEC AEROBE CULT: NORMAL

## 2024-04-16 ENCOUNTER — APPOINTMENT (OUTPATIENT)
Dept: CT IMAGING | Facility: HOSPITAL | Age: 49
End: 2024-04-16
Payer: MEDICARE

## 2024-04-16 ENCOUNTER — HOSPITAL ENCOUNTER (EMERGENCY)
Facility: HOSPITAL | Age: 49
Discharge: HOME | End: 2024-04-17
Attending: EMERGENCY MEDICINE
Payer: MEDICARE

## 2024-04-16 DIAGNOSIS — K94.20 GASTROSTOMY COMPLICATION: ICD-10-CM

## 2024-04-16 DIAGNOSIS — Z46.59 ENCOUNTER FOR FEEDING TUBE PLACEMENT: ICD-10-CM

## 2024-04-16 DIAGNOSIS — R82.81 PYURIA: ICD-10-CM

## 2024-04-16 DIAGNOSIS — N99.528 NEPHROSTOMY COMPLICATION: Primary | ICD-10-CM

## 2024-04-16 LAB
ALBUMIN SERPL-MCNC: 3.9 G/DL (ref 3.5–5.2)
ALBUMIN/GLOB SERPL: 1.3 G/DL
ALP SERPL-CCNC: 86 U/L (ref 39–117)
ALT SERPL W P-5'-P-CCNC: 14 U/L (ref 1–33)
ANION GAP SERPL CALCULATED.3IONS-SCNC: 9 MMOL/L (ref 5–15)
AST SERPL-CCNC: 12 U/L (ref 1–32)
BACTERIA UR QL AUTO: ABNORMAL /HPF
BASOPHILS # BLD AUTO: 0.04 10*3/MM3 (ref 0–0.2)
BASOPHILS NFR BLD AUTO: 0.3 % (ref 0–1.5)
BILIRUB SERPL-MCNC: 0.4 MG/DL (ref 0–1.2)
BILIRUB UR QL STRIP: NEGATIVE
BUN SERPL-MCNC: 27 MG/DL (ref 6–20)
BUN/CREAT SERPL: 58.7 (ref 7–25)
CALCIUM SPEC-SCNC: 10.2 MG/DL (ref 8.6–10.5)
CHLORIDE SERPL-SCNC: 101 MMOL/L (ref 98–107)
CLARITY UR: ABNORMAL
CO2 SERPL-SCNC: 29 MMOL/L (ref 22–29)
COLOR UR: YELLOW
CREAT SERPL-MCNC: 0.46 MG/DL (ref 0.57–1)
DEPRECATED RDW RBC AUTO: 46.1 FL (ref 37–54)
EGFRCR SERPLBLD CKD-EPI 2021: 119.7 ML/MIN/1.73
EOSINOPHIL # BLD AUTO: 0.22 10*3/MM3 (ref 0–0.4)
EOSINOPHIL NFR BLD AUTO: 1.8 % (ref 0.3–6.2)
ERYTHROCYTE [DISTWIDTH] IN BLOOD BY AUTOMATED COUNT: 13.4 % (ref 12.3–15.4)
GLOBULIN UR ELPH-MCNC: 3.1 GM/DL
GLUCOSE SERPL-MCNC: 136 MG/DL (ref 65–99)
GLUCOSE UR STRIP-MCNC: NEGATIVE MG/DL
HCT VFR BLD AUTO: 33.1 % (ref 34–46.6)
HGB BLD-MCNC: 10.8 G/DL (ref 12–15.9)
HGB UR QL STRIP.AUTO: ABNORMAL
HYALINE CASTS UR QL AUTO: ABNORMAL /LPF
IMM GRANULOCYTES # BLD AUTO: 0.03 10*3/MM3 (ref 0–0.05)
IMM GRANULOCYTES NFR BLD AUTO: 0.2 % (ref 0–0.5)
KETONES UR QL STRIP: NEGATIVE
LEUKOCYTE ESTERASE UR QL STRIP.AUTO: ABNORMAL
LYMPHOCYTES # BLD AUTO: 1.22 10*3/MM3 (ref 0.7–3.1)
LYMPHOCYTES NFR BLD AUTO: 9.9 % (ref 19.6–45.3)
MCH RBC QN AUTO: 30.6 PG (ref 26.6–33)
MCHC RBC AUTO-ENTMCNC: 32.6 G/DL (ref 31.5–35.7)
MCV RBC AUTO: 93.8 FL (ref 79–97)
MONOCYTES # BLD AUTO: 0.82 10*3/MM3 (ref 0.1–0.9)
MONOCYTES NFR BLD AUTO: 6.6 % (ref 5–12)
NEUTROPHILS NFR BLD AUTO: 10.05 10*3/MM3 (ref 1.7–7)
NEUTROPHILS NFR BLD AUTO: 81.2 % (ref 42.7–76)
NITRITE UR QL STRIP: NEGATIVE
NRBC BLD AUTO-RTO: 0 /100 WBC (ref 0–0.2)
PH UR STRIP.AUTO: 8.5 [PH] (ref 5–8)
PLATELET # BLD AUTO: 300 10*3/MM3 (ref 140–450)
PMV BLD AUTO: 10 FL (ref 6–12)
POTASSIUM SERPL-SCNC: 3.9 MMOL/L (ref 3.5–5.2)
PROT SERPL-MCNC: 7 G/DL (ref 6–8.5)
PROT UR QL STRIP: ABNORMAL
RBC # BLD AUTO: 3.53 10*6/MM3 (ref 3.77–5.28)
RBC # UR STRIP: ABNORMAL /HPF
REF LAB TEST METHOD: ABNORMAL
SODIUM SERPL-SCNC: 139 MMOL/L (ref 136–145)
SP GR UR STRIP: 1.02 (ref 1–1.03)
SQUAMOUS #/AREA URNS HPF: ABNORMAL /HPF
UROBILINOGEN UR QL STRIP: ABNORMAL
WBC # UR STRIP: ABNORMAL /HPF
WBC NRBC COR # BLD AUTO: 12.38 10*3/MM3 (ref 3.4–10.8)

## 2024-04-16 PROCEDURE — 87077 CULTURE AEROBIC IDENTIFY: CPT | Performed by: EMERGENCY MEDICINE

## 2024-04-16 PROCEDURE — 99284 EMERGENCY DEPT VISIT MOD MDM: CPT

## 2024-04-16 PROCEDURE — 87086 URINE CULTURE/COLONY COUNT: CPT | Performed by: EMERGENCY MEDICINE

## 2024-04-16 PROCEDURE — 85025 COMPLETE CBC W/AUTO DIFF WBC: CPT | Performed by: EMERGENCY MEDICINE

## 2024-04-16 PROCEDURE — 74176 CT ABD & PELVIS W/O CONTRAST: CPT

## 2024-04-16 PROCEDURE — 87186 SC STD MICRODIL/AGAR DIL: CPT | Performed by: EMERGENCY MEDICINE

## 2024-04-16 PROCEDURE — 81001 URINALYSIS AUTO W/SCOPE: CPT | Performed by: EMERGENCY MEDICINE

## 2024-04-16 PROCEDURE — P9612 CATHETERIZE FOR URINE SPEC: HCPCS

## 2024-04-16 PROCEDURE — 80053 COMPREHEN METABOLIC PANEL: CPT | Performed by: EMERGENCY MEDICINE

## 2024-04-16 PROCEDURE — 25810000003 LACTATED RINGERS SOLUTION: Performed by: EMERGENCY MEDICINE

## 2024-04-16 RX ADMIN — SODIUM CHLORIDE, POTASSIUM CHLORIDE, SODIUM LACTATE AND CALCIUM CHLORIDE 1000 ML: 600; 310; 30; 20 INJECTION, SOLUTION INTRAVENOUS at 21:51

## 2024-04-17 ENCOUNTER — APPOINTMENT (OUTPATIENT)
Dept: GENERAL RADIOLOGY | Facility: HOSPITAL | Age: 49
End: 2024-04-17
Payer: MEDICARE

## 2024-04-17 VITALS
SYSTOLIC BLOOD PRESSURE: 103 MMHG | WEIGHT: 142 LBS | TEMPERATURE: 98.3 F | DIASTOLIC BLOOD PRESSURE: 78 MMHG | RESPIRATION RATE: 18 BRPM | OXYGEN SATURATION: 100 % | BODY MASS INDEX: 23.66 KG/M2 | HEART RATE: 109 BPM | HEIGHT: 65 IN

## 2024-04-17 PROCEDURE — 25010000002 KETOROLAC TROMETHAMINE PER 15 MG: Performed by: EMERGENCY MEDICINE

## 2024-04-17 PROCEDURE — 74018 RADEX ABDOMEN 1 VIEW: CPT

## 2024-04-17 PROCEDURE — 96372 THER/PROPH/DIAG INJ SC/IM: CPT

## 2024-04-17 RX ORDER — KETOROLAC TROMETHAMINE 30 MG/ML
30 INJECTION, SOLUTION INTRAMUSCULAR; INTRAVENOUS EVERY 6 HOURS PRN
Status: DISCONTINUED | OUTPATIENT
Start: 2024-04-17 | End: 2024-04-17 | Stop reason: HOSPADM

## 2024-04-17 RX ADMIN — KETOROLAC TROMETHAMINE 30 MG: 30 INJECTION, SOLUTION INTRAMUSCULAR; INTRAVENOUS at 01:42

## 2024-04-17 NOTE — ED PROVIDER NOTES
Subjective   History of Present Illness  This is a 48-year-old unfortunate patient who is got a history of anoxic brain injury also has got a history of bladder rupture and has got a nephrostomy in place and a Mojica catheter and also has got a G-tube in place.  The nephrostomy was placed in [place].  The patient significant other states that today while turning the nephrostomy tube came out.  Also the concern about the G-tube is not functioning.  The G-tube has been tight at the end with a rubber band running around it to prevent leakage.  Came to the ED for evaluation of her complaints.  There is no nausea vomiting associate with this.  There is no fever associate with this at this time.  Patient has got flexion contractures of all 4 extremities.  And has decreased awareness and alertness which is chronic.  Significant other states that her mental status is at his baseline.    Illness  Location:  Nephrostomy tube problems.  Onset quality:  Sudden  Chronicity:  Recurrent  Associated symptoms: no abdominal pain, no chest pain, no fever, no shortness of breath and no vomiting        Review of Systems   Unable to perform ROS: Patient nonverbal   Constitutional:  Negative for fever.   Respiratory:  Negative for shortness of breath.    Cardiovascular:  Negative for chest pain.   Gastrointestinal:  Negative for abdominal pain and vomiting.       Past Medical History:   Diagnosis Date    Acute kidney failure, unspecified     Angina at rest     Anoxic brain damage, not elsewhere classified     Chronic pulmonary embolism     Chronic respiratory failure, unspecified whether with hypoxia or hypercapnia     Dependence on respirator (ventilator) status     Gastrointestinal hemorrhage, unspecified     Hypercalcemia     Iron deficiency anemia     Metabolic encephalopathy     Migraine     Sees Pain Management for injections.     MVP (mitral valve prolapse)     Other dysphagia     Other pulmonary embolism with acute cor pulmonale      Renal disorder     Ruptured bladder     Syncope     Urinary tract infection, site not specified        Allergies   Allergen Reactions    Coconut Unknown - High Severity    Levaquin [Levofloxacin] Other (See Comments)     unknown    Nuts Unknown - High Severity    Penicillins Rash     Patient's father noted patient had taken penicillin, many years ago, many times and then started developing a rash when she would take it.  She has received cefepime, ceftriaxone and cephalexin with no known adverse problems    Turkey Other (See Comments)     Causes migraines per pt reports       Past Surgical History:   Procedure Laterality Date    ABDOMINAL SURGERY      BREAST BIOPSY Right 2011    benign    GTUBE INSERTION      INSERTION HEMODIALYSIS CATHETER Left 09/16/2021    Procedure: HEMODIALYSIS CATHETER INSERTION;  Surgeon: Anders Kaur MD;  Location: [place] [address];  Service: Vascular;  Laterality: Left;    TONSILLECTOMY      TRACHEOSTOMY         Family History   Problem Relation Age of Onset    Colon cancer Maternal Aunt 52    Fibromyalgia Mother     Heart disease Mother     Hypertension Mother     Hodgkin's lymphoma Paternal Grandmother     Ovarian cancer Neg Hx     Breast cancer Neg Hx        Social History     Socioeconomic History    Marital status:    Tobacco Use    Smoking status: Never    Smokeless tobacco: Never   Vaping Use    Vaping status: Never Used   Substance and Sexual Activity    Alcohol use: No    Drug use: No    Sexual activity: Defer     Birth control/protection: OCP           Objective   Physical Exam  Vitals and nursing note reviewed. Exam conducted with a chaperone present.   Constitutional:       General: She is awake.      Comments: Got flexion contracture of the extremities.   HENT:      Head: Normocephalic and atraumatic.   Eyes:      General: Lids are normal.      Conjunctiva/sclera: Conjunctivae normal.      Pupils: Pupils are equal, round, and reactive to light.    Cardiovascular:      Rate and Rhythm: Normal rate and regular rhythm. Tachycardia present.      Chest Wall: PMI is not displaced.      Pulses: Normal pulses.      Heart sounds: Normal heart sounds.   Pulmonary:      Effort: Pulmonary effort is normal.      Breath sounds: Normal breath sounds. No decreased breath sounds.   Abdominal:      General: Abdomen is flat. Bowel sounds are normal.      Palpations: Abdomen is soft.      Tenderness: There is no abdominal tenderness.      Comments:   Scar no guarding or rebound G-tube is in place.   Genitourinary:     Comments: Examination reveals nephrostomy tube is still in place but has been pulled out by approximately 4 cm.  There is no suture noted at this time.  There is no indication of cellulitis on the side.  Musculoskeletal:         General: Normal range of motion.      Cervical back: Full passive range of motion without pain, normal range of motion and neck supple.   Skin:     General: Skin is warm and dry.      Capillary Refill: Capillary refill takes less than 2 seconds.   Neurological:      General: No focal deficit present.      Mental Status: Mental status is at baseline. She is disoriented and confused.      Motor: Motor function is intact.      Comments: Patient has no new focal neurological deficits.  She got history of stroke and anoxic brain injury is very difficult to certain her neurological status.As per the  she is at her baseline at this time.         Feeding Tube Replacement    Date/Time: 4/17/2024 2:23 AM    Performed by: Lee Dexter MD  Authorized by: Lee Dexter MD    Consent:     Consent obtained:  Verbal    Consent given by:  Spouse    Risks, benefits, and alternatives were discussed: yes      Risks discussed:  Bleeding, infection and pain    Alternatives discussed:  Delayed treatment  Universal protocol:     Procedure explained and questions answered to patient or proxy's satisfaction: yes      Immediately prior to procedure, a  time out was called: yes      Patient identity confirmed:  Hospital-assigned identification number  Pre-procedure details:     Old tube type:  Gastrostomy    Old tube size: 20 Upper sorbian.  Sedation:     Sedation type:  None  Anesthesia:     Anesthesia method:  Topical application  Procedure details:     Patient position:  Recumbent    Procedure type:  Replacement    Tube type:  Gastrostomy    Tube size: 20 Upper sorbian.    Bulb inflation volume:  10 mL    Bulb inflation fluid:  Normal saline  Post-procedure details:     Placement/position confirmation:  Auscultation, gastric contents aspirated, contrast and x-ray    Placement difficulty:  None    Bleeding:  None    Procedure completion:  Tolerated             ED Course  ED Course as of 04/17/24 0228 Wed Apr 17, 2024 0226 Patient urinalysis shows numerous to count to be seen RBCs with 2+ bacteria clinically I do not suspect a pyelonephritis on this patient with a negative CT of pyonephritis.  And a white cell count only 12,000 and no fever.  Unguinal hold off giving antibiotics at this time and have her follow-up with the primary MD. [TS]   0228 Patient is currently on antibiotics at the nursing home [TS]   0228 Patient has future consultation/appointment at [facility] for removal of the nephrostomy. [TS]      ED Course User Index  [TS] Lee Dexter MD                                             Medical Decision Making  Probability of nephrostomy tube abnormality.  Came to the ER for evaluation of her complaints.  CT scan shows the nephrostomy tube to be located within the distal left ureter proximal to the left UVJ and urinary bladder.  And a 1.5 cm calculus within the right renal pelvis collecting system.  Which has been seen in the past also pain    Amount and/or Complexity of Data Reviewed  Labs: ordered.     Details: Reviewed  Radiology: ordered.     Details: ET scan reviewed  Discussion of management or test interpretation with external provider(s): Discussed this  case with Dr. Stewart who is on-call for nephrology at [facility] after discussion we will leave the nephrostomy tube as is and will change the G-tube and discharged home.        Final diagnoses:   Nephrostomy complication   Gastrostomy complication   Encounter for feeding tube placement   Pyuria       ED Disposition  ED Disposition       ED Disposition   Discharge    Condition   Stable    Comment   --               David Lara MD  [address] [place] [address] [address] ASHBY  [facility] [address]  [phone]    In 1 day           Medication List        Changed      liver oil-zinc oxide 40 % ointment  Commonly known as: DESITIN  Apply 1 application  topically to the appropriate area as directed Every 4 (Four) Hours As Needed for Irritation.  What changed: additional instructions            Stop      sulfamethoxazole-trimethoprim 200-40 MG/5ML suspension  Commonly known as: LIS MONTES Tariq, MD  04/17/24 0228

## 2024-04-22 LAB — BACTERIA SPEC AEROBE CULT: ABNORMAL

## 2024-05-03 ENCOUNTER — APPOINTMENT (OUTPATIENT)
Dept: CT IMAGING | Facility: HOSPITAL | Age: 49
End: 2024-05-03
Payer: MEDICARE

## 2024-05-03 ENCOUNTER — APPOINTMENT (OUTPATIENT)
Dept: GENERAL RADIOLOGY | Facility: HOSPITAL | Age: 49
End: 2024-05-03
Payer: MEDICARE

## 2024-05-03 ENCOUNTER — HOSPITAL ENCOUNTER (EMERGENCY)
Facility: HOSPITAL | Age: 49
Discharge: HOME | End: 2024-05-04
Attending: EMERGENCY MEDICINE
Payer: MEDICARE

## 2024-05-03 DIAGNOSIS — T83.511A URINARY TRACT INFECTION ASSOCIATED WITH INDWELLING URETHRAL CATHETER, INITIAL ENCOUNTER: Primary | ICD-10-CM

## 2024-05-03 DIAGNOSIS — N39.0 URINARY TRACT INFECTION ASSOCIATED WITH INDWELLING URETHRAL CATHETER, INITIAL ENCOUNTER: Primary | ICD-10-CM

## 2024-05-03 LAB
ALBUMIN SERPL-MCNC: 3.8 G/DL (ref 3.5–5.2)
ALBUMIN/GLOB SERPL: 1 G/DL
ALP SERPL-CCNC: 84 U/L (ref 39–117)
ALT SERPL W P-5'-P-CCNC: 15 U/L (ref 1–33)
ANION GAP SERPL CALCULATED.3IONS-SCNC: 12 MMOL/L (ref 5–15)
AST SERPL-CCNC: 16 U/L (ref 1–32)
BACTERIA UR QL AUTO: ABNORMAL /HPF
BASOPHILS # BLD AUTO: 0.05 10*3/MM3 (ref 0–0.2)
BASOPHILS NFR BLD AUTO: 0.5 % (ref 0–1.5)
BILIRUB SERPL-MCNC: 0.2 MG/DL (ref 0–1.2)
BILIRUB UR QL STRIP: NEGATIVE
BUN SERPL-MCNC: 29 MG/DL (ref 6–20)
BUN/CREAT SERPL: 76.3 (ref 7–25)
CALCIUM SPEC-SCNC: 10.2 MG/DL (ref 8.6–10.5)
CHLORIDE SERPL-SCNC: 107 MMOL/L (ref 98–107)
CLARITY UR: ABNORMAL
CO2 SERPL-SCNC: 25 MMOL/L (ref 22–29)
COLOR UR: ABNORMAL
CREAT SERPL-MCNC: 0.38 MG/DL (ref 0.57–1)
DEPRECATED RDW RBC AUTO: 45 FL (ref 37–54)
EGFRCR SERPLBLD CKD-EPI 2021: 125.3 ML/MIN/1.73
EOSINOPHIL # BLD AUTO: 0.3 10*3/MM3 (ref 0–0.4)
EOSINOPHIL NFR BLD AUTO: 2.8 % (ref 0.3–6.2)
ERYTHROCYTE [DISTWIDTH] IN BLOOD BY AUTOMATED COUNT: 13.2 % (ref 12.3–15.4)
GLOBULIN UR ELPH-MCNC: 3.9 GM/DL
GLUCOSE SERPL-MCNC: 124 MG/DL (ref 65–99)
GLUCOSE UR STRIP-MCNC: NEGATIVE MG/DL
HCT VFR BLD AUTO: 34.6 % (ref 34–46.6)
HGB BLD-MCNC: 10.9 G/DL (ref 12–15.9)
HGB UR QL STRIP.AUTO: ABNORMAL
HYALINE CASTS UR QL AUTO: ABNORMAL /LPF
IMM GRANULOCYTES # BLD AUTO: 0.1 10*3/MM3 (ref 0–0.05)
IMM GRANULOCYTES NFR BLD AUTO: 0.9 % (ref 0–0.5)
KETONES UR QL STRIP: NEGATIVE
LEUKOCYTE ESTERASE UR QL STRIP.AUTO: ABNORMAL
LYMPHOCYTES # BLD AUTO: 2.14 10*3/MM3 (ref 0.7–3.1)
LYMPHOCYTES NFR BLD AUTO: 19.8 % (ref 19.6–45.3)
MCH RBC QN AUTO: 29.4 PG (ref 26.6–33)
MCHC RBC AUTO-ENTMCNC: 31.5 G/DL (ref 31.5–35.7)
MCV RBC AUTO: 93.3 FL (ref 79–97)
MONOCYTES # BLD AUTO: 0.51 10*3/MM3 (ref 0.1–0.9)
MONOCYTES NFR BLD AUTO: 4.7 % (ref 5–12)
NEUTROPHILS NFR BLD AUTO: 7.72 10*3/MM3 (ref 1.7–7)
NEUTROPHILS NFR BLD AUTO: 71.3 % (ref 42.7–76)
NITRITE UR QL STRIP: POSITIVE
NRBC BLD AUTO-RTO: 0 /100 WBC (ref 0–0.2)
PH UR STRIP.AUTO: 8 [PH] (ref 5–8)
PLATELET # BLD AUTO: 380 10*3/MM3 (ref 140–450)
PMV BLD AUTO: 10.1 FL (ref 6–12)
POTASSIUM SERPL-SCNC: 4.4 MMOL/L (ref 3.5–5.2)
PROT SERPL-MCNC: 7.7 G/DL (ref 6–8.5)
PROT UR QL STRIP: ABNORMAL
RBC # BLD AUTO: 3.71 10*6/MM3 (ref 3.77–5.28)
RBC # UR STRIP: ABNORMAL /HPF
REF LAB TEST METHOD: ABNORMAL
SODIUM SERPL-SCNC: 144 MMOL/L (ref 136–145)
SP GR UR STRIP: 1.02 (ref 1–1.03)
SQUAMOUS #/AREA URNS HPF: ABNORMAL /HPF
UROBILINOGEN UR QL STRIP: ABNORMAL
WBC # UR STRIP: ABNORMAL /HPF
WBC CLUMPS # UR AUTO: ABNORMAL /HPF
WBC NRBC COR # BLD AUTO: 10.82 10*3/MM3 (ref 3.4–10.8)

## 2024-05-03 PROCEDURE — 85025 COMPLETE CBC W/AUTO DIFF WBC: CPT | Performed by: EMERGENCY MEDICINE

## 2024-05-03 PROCEDURE — 74177 CT ABD & PELVIS W/CONTRAST: CPT

## 2024-05-03 PROCEDURE — 80053 COMPREHEN METABOLIC PANEL: CPT | Performed by: EMERGENCY MEDICINE

## 2024-05-03 PROCEDURE — 51702 INSERT TEMP BLADDER CATH: CPT

## 2024-05-03 PROCEDURE — 71045 X-RAY EXAM CHEST 1 VIEW: CPT

## 2024-05-03 PROCEDURE — 25510000001 IOPAMIDOL 61 % SOLUTION: Performed by: EMERGENCY MEDICINE

## 2024-05-03 PROCEDURE — 36415 COLL VENOUS BLD VENIPUNCTURE: CPT

## 2024-05-03 PROCEDURE — 81001 URINALYSIS AUTO W/SCOPE: CPT | Performed by: EMERGENCY MEDICINE

## 2024-05-03 PROCEDURE — 99285 EMERGENCY DEPT VISIT HI MDM: CPT

## 2024-05-03 PROCEDURE — P9612 CATHETERIZE FOR URINE SPEC: HCPCS

## 2024-05-03 RX ORDER — ONDANSETRON 2 MG/ML
4 INJECTION INTRAMUSCULAR; INTRAVENOUS ONCE
Status: DISCONTINUED | OUTPATIENT
Start: 2024-05-03 | End: 2024-05-03

## 2024-05-03 RX ADMIN — IOPAMIDOL 100 ML: 612 INJECTION, SOLUTION INTRAVENOUS at 22:09

## 2024-05-03 NOTE — ED PROVIDER NOTES
"EMERGENCY DEPARTMENT ATTENDING NOTE    Patient Name: Namita Zabala    Chief Complaint   Patient presents with    Blood in Urine       PATIENT PRESENTATION:  Namita Zabala is a 48 y.o. female with PMH significant for left-sided nephrostomy and Mojica, tracheostomy on blow-by, GJ tube, all after severe hypoxic brain injury secondary to COVID who presents to the ED from nursing facility by EMS due to concerns for blood in her Mojica.  Patient unable to provide any history and 's on the way.  Unsure of how long she has had blood but appears to be chronic issue per chart review.  Per nursing staff who is care for the patient before patient appears to be at her baseline.       arrives and provided additional contacts.  He requested nursing home sent the patient in due to the changes in urine and history of UTIs and sepsis requiring admission.  He also reports increased sputum production and a cough occasionally.      PHYSICAL EXAM:   VS: /84   Pulse 98   Temp 99.2 °F (37.3 °C) (Axillary)   Resp 20   Ht 165.1 cm (65\")   Wt 64 kg (141 lb 1.5 oz)   SpO2 95%   BMI 23.48 kg/m²   GENERAL: Spontaneously opening eyes and tracks to verbal stimuli, upper and lower extremity contractures, in no acute distress  EYES: PERRL, sclerae anicteric, extraocular movements grossly intact, symmetric lids  EARS, NOSE, MOUTH, THROAT: atraumatic external nose and ears, moist mucous membranes, trach in place with no significant discharge or skin changes  NECK: symmetric, trachea midline  RESPIRATORY: unlabored respiratory effort, clear to auscultation bilaterally, good air movement  CARDIOVASCULAR: no murmurs, peripheral pulses 2+ and equal in all extremities,   GI: soft, nontender, nondistended, GJ tube in place with no skin changes surrounding insertion, left-sided nephrostomy tube present with no surrounding skin changes, Mojica catheter with dark reddish-purple blood and current tube with yellow " urine  MUSCULOSKELETAL/EXTREMITIES: extremities without obvious deformity  SKIN: warm and dry with no obvious rashes  NEUROLOGIC: No lateralizing findings unable to conduct a complete neuroassessment  PSYCHIATRIC: Unable to assess given baseline mental status      MEDICAL DECISION MAKING:    Namita Zabala is a 48 y.o. female who presented to the ED with hematuria in her Mojica    Procedures    Differential Diagnosis Considered: Nephrostomy dislodgment, renal stone, UTI, pyelonephritis, pneumonia    Labs Ordered:  Labs Reviewed   COMPREHENSIVE METABOLIC PANEL - Abnormal; Notable for the following components:       Result Value    Glucose 124 (*)     BUN 29 (*)     Creatinine 0.38 (*)     BUN/Creatinine Ratio 76.3 (*)     All other components within normal limits    Narrative:     GFR Normal >60  Chronic Kidney Disease <60  Kidney Failure <15     URINALYSIS W/ MICROSCOPIC IF INDICATED (NO CULTURE) - Abnormal; Notable for the following components:    Color, UA Dark Yellow (*)     Appearance, UA Turbid (*)     Blood, UA Large (3+) (*)     Protein,  mg/dL (2+) (*)     Leuk Esterase, UA Large (3+) (*)     Nitrite, UA Positive (*)     All other components within normal limits   CBC WITH AUTO DIFFERENTIAL - Abnormal; Notable for the following components:    WBC 10.82 (*)     RBC 3.71 (*)     Hemoglobin 10.9 (*)     Monocyte % 4.7 (*)     Immature Grans % 0.9 (*)     Neutrophils, Absolute 7.72 (*)     Immature Grans, Absolute 0.10 (*)     All other components within normal limits   URINALYSIS, MICROSCOPIC ONLY - Abnormal; Notable for the following components:    RBC, UA 21-50 (*)     WBC, UA Too Numerous to Count (*)     Bacteria, UA 3+ (*)     Squamous Epithelial Cells, UA 3-6 (*)     All other components within normal limits   CBC AND DIFFERENTIAL    Narrative:     The following orders were created for panel order CBC & Differential.  Procedure                               Abnormality         Status                      ---------                               -----------         ------                     CBC Auto Differential[[id number]]        Abnormal            Final result                 Please view results for these tests on the individual orders.        Imaging Ordered:   XR Chest 1 View   Final Result       No acute cardiopulmonary abnormality.       Right apex and left lung base are partially obscured.               This report was signed and finalized on 5/3/2024 7:51 PM by Amilcar Mckenna.          CT Abdomen Pelvis With Contrast    (Results Pending)       Internal chart review:   Past Medical History:   Diagnosis Date    Acute kidney failure, unspecified     Angina at rest     Anoxic brain damage, not elsewhere classified     Chronic pulmonary embolism     Chronic respiratory failure, unspecified whether with hypoxia or hypercapnia     Dependence on respirator (ventilator) status     Gastrointestinal hemorrhage, unspecified     Hypercalcemia     Iron deficiency anemia     Metabolic encephalopathy     Migraine     Sees Pain Management for injections.     MVP (mitral valve prolapse)     Other dysphagia     Other pulmonary embolism with acute cor pulmonale     Renal disorder     Ruptured bladder     Syncope     Urinary tract infection, site not specified        Past Surgical History:   Procedure Laterality Date    ABDOMINAL SURGERY      BREAST BIOPSY Right 2011    benign    GTUBE INSERTION      INSERTION HEMODIALYSIS CATHETER Left 09/16/2021    Procedure: HEMODIALYSIS CATHETER INSERTION;  Surgeon: Anders Kaur MD;  Location: [place] [address];  Service: Vascular;  Laterality: Left;    TONSILLECTOMY      TRACHEOSTOMY         Allergies   Allergen Reactions    Coconut Unknown - High Severity    Levaquin [Levofloxacin] Other (See Comments)     unknown    Nuts Unknown - High Severity    Penicillins Rash     Patient's father noted patient had taken penicillin, many years ago, many times and then started  developing a rash when she would take it.  She has received cefepime, ceftriaxone and cephalexin with no known adverse problems    Turkey Other (See Comments)     Causes migraines per pt reports       No current facility-administered medications for this encounter.    Current Outpatient Medications:     acetaminophen (TYLENOL) 500 MG tablet, Administer 1 tablet per G tube Every 4 (Four) Hours As Needed for Fever. Not to exceed 3000mg from all sources daily, Disp: , Rfl:     baclofen (LIORESAL) 20 MG tablet, Administer 0.5 tablets per G tube 3 (Three) Times a Day., Disp: , Rfl:     bisacodyl (DULCOLAX) 10 MG suppository, Insert 1 suppository into the rectum Every Other Day As Needed for Constipation., Disp: , Rfl:     carboxymethylcellulose (REFRESH PLUS) 0.5 % solution, Administer 1 drop to both eyes 2 (Two) Times a Day., Disp: , Rfl:     cefdinir (OMNICEF) 300 MG capsule, Take 1 capsule by mouth 2 (Two) Times a Day for 7 days., Disp: 14 capsule, Rfl: 0    cetirizine (zyrTEC) 10 MG tablet, Take 1 tablet by mouth Daily., Disp: , Rfl:     chlorhexidine (PERIDEX) 0.12 % solution, Apply 15 mL to the mouth or throat 2 (Two) Times a Day. 15 ml using oral swab twice daily for oral care, Disp: , Rfl:     Citric Lv-Lhejvqdfsuj-Bm Carb (Renacidin) solution, Irrigate with 60 mL to the affected area as directed by provider 2 (Two) Times a Day. Irrigate stratton catheter, Disp: , Rfl:     fluticasone (FLONASE) 50 MCG/ACT nasal spray, 2 sprays into the nostril(s) as directed by provider Every Morning., Disp: , Rfl:     guaifenesin (ROBITUSSIN) 100 MG/5ML liquid, Administer 30 mL per G tube 2 (Two) Times a Day., Disp: , Rfl:     hydrOXYzine pamoate (VISTARIL) 25 MG capsule, Administer 1 capsule per G tube Every 6 (Six) Hours As Needed for Itching., Disp: , Rfl:     ipratropium-albuterol (DUO-NEB) 0.5-2.5 mg/3 ml nebulizer, Take 3 mL by nebulization Every 4 (Four) Hours As Needed for Wheezing., Disp: , Rfl:     liver oil-zinc oxide  (DESITIN) 40 % ointment, Apply 1 application  topically to the appropriate area as directed Every 4 (Four) Hours As Needed for Irritation. (Patient taking differently: Apply 1 Application topically to the appropriate area as directed Every 4 (Four) Hours As Needed for Irritation. To perirectal and groin area after any stool incontinence), Disp: , Rfl:     miconazole (MICOTIN) 2 % powder, Apply 1 Application topically to the appropriate area as directed 2 (Two) Times a Day As Needed for Itching. Apply to groin or gluteal folds for rash, itching, redness and/or irritation, Disp: , Rfl:     multivitamin with minerals tablet tablet, Take 1 tablet by mouth Every Morning., Disp: , Rfl:     nystatin (MYCOSTATIN) 538713 UNIT/GM powder, Apply 1 Application topically to the appropriate area as directed 2 (Two) Times a Day. To bilateral bendarms, left abdominal fold, and under left breast, Disp: , Rfl:     ondansetron (ZOFRAN) 4 MG/5ML solution, Take 5 mL by mouth Every 6 (Six) Hours As Needed for Nausea or Vomiting., Disp: , Rfl:     pantoprazole (PROTONIX) 2 mg/mL suspension suspension, Administer 20 mL per G tube 2 (Two) Times a Day., Disp: , Rfl:     polyethylene glycol (MiraLax) 17 g packet, Take 17 g by mouth Daily., Disp: , Rfl:     rosuvastatin (CRESTOR) 5 MG tablet, Administer 1 tablet per G tube Every Night., Disp: , Rfl:     Scopolamine 1 MG/3DAYS patch, Place 1 patch on the skin as directed by provider Every 72 (Seventy-Two) Hours., Disp: , Rfl:     sennosides-docusate (Senna Plus) 8.6-50 MG per tablet, Take 2 tablets by mouth 2 (Two) Times a Day., Disp: , Rfl:     simethicone (MYLICON) 40 MG/0.6ML drops, Administer 0.3 mL per G tube Every 6 (Six) Hours., Disp: , Rfl:     sodium chloride (NS) 0.9 % irrigation, Irrigate with 10 mL to the affected area as directed by provider Every 8 (Eight) Hours. Irrigation for secretions: Hydration, Disp: , Rfl:     Sodium Phosphates (fleet enema) 7-19 GM/118ML enema, Insert 1  enema into the rectum Every Other Day As Needed (bowel management)., Disp: , Rfl:     External documents reviewed: Reviewed prior ER visits in March and April with similar symptoms.    My lab interpretation: Urinalysis with gross infection nitrate positive, stable chronic anemia minimal leukocytosis of 10.8.  Normal GFR.    My imaging interpretation: CT without hydroureter or stone.  Nephrostomy tube in place, no stones.  Chest x-ray without acute process    Discussed with: Patient's  regarding history of UTIs and prior sepsis    ED Course and Re-evaluation: 48-year-old female with multiple chronic comorbidities coming from the nursing home afebrile nontoxic with concerns for UTI with blood in her urine.  Urine grossly infected with a minimal elevation in white count.  Does not meet criteria for sepsis and is not acidotic.  Do not suspect pyelonephritis with no changes on CT scan.  Will treat with cefdinir and nursing home can administer through the G-tube.  Prescription placed.  Lab work otherwise unremarkable and imaging without further concerns to include kidney stone, abscess, dislodged nephrostomy tube.    ED Diagnosis:  (T83.511A,  N39.0) Urinary tract infection associated with indwelling urethral catheter, initial encounter     Disposition: to home  Follow up plan: PCP follow up within 2 days, return to ED immediately if symptoms worsen        Signed:  Taj Stratton MD  Emergency Medicine Physician    Please note that portions of this note were completed with a voice recognition program.      Taj Stratton MD  05/04/24 0050

## 2024-05-04 VITALS
OXYGEN SATURATION: 94 % | SYSTOLIC BLOOD PRESSURE: 124 MMHG | RESPIRATION RATE: 20 BRPM | TEMPERATURE: 99 F | DIASTOLIC BLOOD PRESSURE: 92 MMHG | HEIGHT: 65 IN | BODY MASS INDEX: 23.51 KG/M2 | WEIGHT: 141.09 LBS | HEART RATE: 92 BPM

## 2024-05-04 PROCEDURE — 94799 UNLISTED PULMONARY SVC/PX: CPT

## 2024-05-04 PROCEDURE — 94761 N-INVAS EAR/PLS OXIMETRY MLT: CPT

## 2024-05-04 RX ORDER — CEFDINIR 300 MG/1
300 CAPSULE ORAL 2 TIMES DAILY
Qty: 14 CAPSULE | Refills: 0 | Status: SHIPPED | OUTPATIENT
Start: 2024-05-04 | End: 2024-05-11

## 2024-05-04 NOTE — ED NOTES
Called  at this time and spoke to Grant, informed him pt to be going back to [facility] and meds have been faxed over, update given

## 2024-05-04 NOTE — ED NOTES
Waiting on [facility] for transport to [facility] at this time, forms have been faxed

## 2024-05-23 ENCOUNTER — LAB REQUISITION (OUTPATIENT)
Dept: LAB | Facility: HOSPITAL | Age: 49
End: 2024-05-23
Payer: MEDICARE

## 2024-05-23 DIAGNOSIS — N31.9 NEUROMUSCULAR DYSFUNCTION OF BLADDER, UNSPECIFIED: ICD-10-CM

## 2024-05-23 DIAGNOSIS — N39.0 URINARY TRACT INFECTION, SITE NOT SPECIFIED: ICD-10-CM

## 2024-05-23 DIAGNOSIS — K31.1 ADULT HYPERTROPHIC PYLORIC STENOSIS: ICD-10-CM

## 2024-05-23 PROCEDURE — 87186 SC STD MICRODIL/AGAR DIL: CPT | Performed by: GENERAL PRACTICE

## 2024-05-23 PROCEDURE — 87086 URINE CULTURE/COLONY COUNT: CPT | Performed by: GENERAL PRACTICE

## 2024-05-23 PROCEDURE — 87077 CULTURE AEROBIC IDENTIFY: CPT | Performed by: GENERAL PRACTICE

## 2024-05-26 LAB
BACTERIA SPEC AEROBE CULT: ABNORMAL
BACTERIA SPEC AEROBE CULT: ABNORMAL

## 2024-06-04 ENCOUNTER — HOSPITAL ENCOUNTER (EMERGENCY)
Facility: HOSPITAL | Age: 49
Discharge: HOME | End: 2024-06-04
Attending: EMERGENCY MEDICINE
Payer: MEDICARE

## 2024-06-04 ENCOUNTER — APPOINTMENT (OUTPATIENT)
Dept: GENERAL RADIOLOGY | Facility: HOSPITAL | Age: 49
End: 2024-06-04
Payer: MEDICARE

## 2024-06-04 VITALS
OXYGEN SATURATION: 98 % | WEIGHT: 141.09 LBS | HEIGHT: 63 IN | BODY MASS INDEX: 25 KG/M2 | RESPIRATION RATE: 20 BRPM | DIASTOLIC BLOOD PRESSURE: 60 MMHG | TEMPERATURE: 98.4 F | SYSTOLIC BLOOD PRESSURE: 112 MMHG | HEART RATE: 88 BPM

## 2024-06-04 DIAGNOSIS — K94.23 PEG TUBE MALFUNCTION: Primary | ICD-10-CM

## 2024-06-04 PROCEDURE — 74018 RADEX ABDOMEN 1 VIEW: CPT

## 2024-06-04 PROCEDURE — 99284 EMERGENCY DEPT VISIT MOD MDM: CPT

## 2024-06-05 NOTE — ED PROVIDER NOTES
Subjective   History of Present Illness  Pt presents to the EC from [facility] with report of G tube displacement.  Pt unable to provide hx - unclear how long this was out.          Review of Systems   Reason unable to perform ROS: aphasia.       Past Medical History:   Diagnosis Date    Acute kidney failure, unspecified     Angina at rest     Anoxic brain damage, not elsewhere classified     Chronic pulmonary embolism     Chronic respiratory failure, unspecified whether with hypoxia or hypercapnia     Dependence on respirator (ventilator) status     Gastrointestinal hemorrhage, unspecified     Hypercalcemia     Iron deficiency anemia     Metabolic encephalopathy     Migraine     Sees Pain Management for injections.     MVP (mitral valve prolapse)     Other dysphagia     Other pulmonary embolism with acute cor pulmonale     Renal disorder     Ruptured bladder     Syncope     Urinary tract infection, site not specified        Allergies   Allergen Reactions    Coconut Unknown - High Severity    Levaquin [Levofloxacin] Other (See Comments)     unknown    Nuts Unknown - High Severity    Penicillins Rash     Patient's father noted patient had taken penicillin, many years ago, many times and then started developing a rash when she would take it.  She has received cefepime, ceftriaxone and cephalexin with no known adverse problems    Turkey Other (See Comments)     Causes migraines per pt reports       Past Surgical History:   Procedure Laterality Date    ABDOMINAL SURGERY      BREAST BIOPSY Right 2011    benign    GTUBE INSERTION      INSERTION HEMODIALYSIS CATHETER Left 09/16/2021    Procedure: HEMODIALYSIS CATHETER INSERTION;  Surgeon: Anders Kaur MD;  Location: [place] [address];  Service: Vascular;  Laterality: Left;    TONSILLECTOMY      TRACHEOSTOMY         Family History   Problem Relation Age of Onset    Colon cancer Maternal Aunt 52    Fibromyalgia Mother     Heart disease Mother     Hypertension  Mother     Hodgkin's lymphoma Paternal Grandmother     Ovarian cancer Neg Hx     Breast cancer Neg Hx        Social History     Socioeconomic History    Marital status:    Tobacco Use    Smoking status: Never    Smokeless tobacco: Never   Vaping Use    Vaping status: Never Used   Substance and Sexual Activity    Alcohol use: No    Drug use: No    Sexual activity: Defer     Birth control/protection: OCP           Objective   Physical Exam  Vitals and nursing note reviewed.   Cardiovascular:      Rate and Rhythm: Normal rate and regular rhythm.      Pulses: Normal pulses.      Heart sounds: Normal heart sounds.   Pulmonary:      Effort: Pulmonary effort is normal.      Breath sounds: Normal breath sounds.   Abdominal:      General: Abdomen is flat.      Palpations: Abdomen is soft.      Comments: G tube site with some scarring . No drianage.   Neurological:      Mental Status: She is alert.      Comments: + contractures to UE/LE bilaterally           Procedures           ED Course                                             Medical Decision Making  Pt stable in EC - NAD att.  Attempted to replace with a 20Fr PEG and 18Fr PEG with resistance/inability to pass.  Placed a 12Fr stratton in tract after speaking with Dr. Tena with [facility].  Will d/c to return to NH and recommend outpt f/u for replacement of tube    Amount and/or Complexity of Data Reviewed  Radiology: ordered.        Final diagnoses:   PEG tube malfunction       ED Disposition  ED Disposition       ED Disposition   Discharge    Condition   Stable    Comment   --               David Lara MD  [address] [place] [address] [address] [place] [id number]  [phone]               Medication List        Changed      liver oil-zinc oxide 40 % ointment  Commonly known as: DESITIN  Apply 1 application  topically to the appropriate area as directed Every 4 (Four) Hours As Needed for Irritation.  What changed: additional instructions                 Helphenstine,  Jean-Claude BAUTISTA,   06/04/24 2155       Jean-Claude Golden,   06/04/24 1319

## 2024-06-05 NOTE — DISCHARGE INSTRUCTIONS
Recommend you follow up with general surgery for replacement of stratton with PEG tube this week.  Okay to use stratton for meds, etc

## 2024-06-21 ENCOUNTER — TRANSCRIBE ORDERS (OUTPATIENT)
Dept: ADMINISTRATIVE | Facility: HOSPITAL | Age: 49
End: 2024-06-21
Payer: MEDICARE

## 2024-06-21 DIAGNOSIS — S37.10XD URETER INJURY, SUBSEQUENT ENCOUNTER: Primary | ICD-10-CM

## 2024-06-21 DIAGNOSIS — N13.30 HYDRONEPHROSIS, UNSPECIFIED HYDRONEPHROSIS TYPE: ICD-10-CM

## 2024-07-03 ENCOUNTER — APPOINTMENT (OUTPATIENT)
Dept: GENERAL RADIOLOGY | Facility: HOSPITAL | Age: 49
End: 2024-07-03
Payer: MEDICARE

## 2024-07-03 ENCOUNTER — HOSPITAL ENCOUNTER (EMERGENCY)
Facility: HOSPITAL | Age: 49
Discharge: HOME | End: 2024-07-03
Attending: STUDENT IN AN ORGANIZED HEALTH CARE EDUCATION/TRAINING PROGRAM
Payer: MEDICARE

## 2024-07-03 VITALS
RESPIRATION RATE: 18 BRPM | HEIGHT: 63 IN | OXYGEN SATURATION: 99 % | TEMPERATURE: 98.1 F | DIASTOLIC BLOOD PRESSURE: 73 MMHG | WEIGHT: 130 LBS | HEART RATE: 72 BPM | SYSTOLIC BLOOD PRESSURE: 127 MMHG | BODY MASS INDEX: 23.04 KG/M2

## 2024-07-03 DIAGNOSIS — R58 BLEEDING: Primary | ICD-10-CM

## 2024-07-03 PROCEDURE — 94799 UNLISTED PULMONARY SVC/PX: CPT

## 2024-07-03 PROCEDURE — 99283 EMERGENCY DEPT VISIT LOW MDM: CPT

## 2024-07-03 PROCEDURE — 71045 X-RAY EXAM CHEST 1 VIEW: CPT

## 2024-07-03 NOTE — DISCHARGE INSTRUCTIONS
Monitor Namita closely for any signs of bleeding.  Monitor the [place]ica output for signs of bleeding, any blood in the stool, for skin rash changes concerning for bruising, or for any other source of bleeding.  She did not have any signs of aspiration today, her exam was reassuring.  The nursing home provider can conduct workup as needed, vital signs were normal today, exam is reassuring today.

## 2024-07-03 NOTE — ED PROVIDER NOTES
Subjective   History of Present Illness  Patient presents due to report of bleeding.  I listened to our nurses and of the report from the nursing home.  They apparently said the blood was coming out of her mouth.  She had her trach exchange earlier in the evening.  It is unclear whether there was any blood come from the trach.  Per report they cleaned her up and set her with EMS.  EMS did not note any bleeding anywhere.  There are no other reports of symptoms or issues.  I called the nursing home repeatedly they did not answer. I called again and again; her nurse says she coughed and blood came from her mouth. It pooled in her mouth and came out. No blood in her trach at all. No report of respiratory distress.     Review of Systems   Unable to perform ROS: Patient nonverbal       Past Medical History:   Diagnosis Date    Acute kidney failure, unspecified     Angina at rest     Anoxic brain damage, not elsewhere classified     Chronic pulmonary embolism     Chronic respiratory failure, unspecified whether with hypoxia or hypercapnia     Dependence on respirator (ventilator) status     Gastrointestinal hemorrhage, unspecified     Hypercalcemia     Iron deficiency anemia     Metabolic encephalopathy     Migraine     Sees Pain Management for injections.     MVP (mitral valve prolapse)     Other dysphagia     Other pulmonary embolism with acute cor pulmonale     Renal disorder     Ruptured bladder     Syncope     Urinary tract infection, site not specified        Allergies   Allergen Reactions    Coconut Unknown - High Severity    Levaquin [Levofloxacin] Other (See Comments)     unknown    Nuts Unknown - High Severity    Penicillins Rash     Patient's father noted patient had taken penicillin, many years ago, many times and then started developing a rash when she would take it.  She has received cefepime, ceftriaxone and cephalexin with no known adverse problems    Turkey Other (See Comments)     Causes migraines per pt  reports       Past Surgical History:   Procedure Laterality Date    ABDOMINAL SURGERY      BREAST BIOPSY Right 2011    benign    GTUBE INSERTION      INSERTION HEMODIALYSIS CATHETER Left 09/16/2021    Procedure: HEMODIALYSIS CATHETER INSERTION;  Surgeon: Anders Kaur MD;  Location: [place] [address];  Service: Vascular;  Laterality: Left;    TONSILLECTOMY      TRACHEOSTOMY         Family History   Problem Relation Age of Onset    Colon cancer Maternal Aunt 52    Fibromyalgia Mother     Heart disease Mother     Hypertension Mother     Hodgkin's lymphoma Paternal Grandmother     Ovarian cancer Neg Hx     Breast cancer Neg Hx        Social History     Socioeconomic History    Marital status:    Tobacco Use    Smoking status: Never    Smokeless tobacco: Never   Vaping Use    Vaping status: Never Used   Substance and Sexual Activity    Alcohol use: No    Drug use: No    Sexual activity: Defer     Birth control/protection: OCP           Objective   Physical Exam  Vitals reviewed.   Constitutional:       General: She is not in acute distress.  HENT:      Head: Normocephalic and atraumatic.      Comments: Limited intraoral exam--pt chronically has significant tone of her masseters and contracture of her face. Tongue depressor utilized to provide intraoral cavity visualization. There is no blood. No tongue biting. Cheeks are clean. Dentition without injury or bleeding.    Trach site CDI no friable tissue no bleeding.  Eyes:      Extraocular Movements: Extraocular movements intact.      Conjunctiva/sclera: Conjunctivae normal.   Cardiovascular:      Pulses: Normal pulses.      Heart sounds: Normal heart sounds.   Pulmonary:      Effort: Pulmonary effort is normal. No respiratory distress.      Breath sounds: Normal breath sounds. No wheezing.   Abdominal:      General: Abdomen is flat. There is no distension.      Tenderness: There is no abdominal tenderness. There is no guarding.   Musculoskeletal:       Cervical back: Normal range of motion and neck supple.      Right lower leg: No edema.      Left lower leg: No edema.   Skin:     General: Skin is warm and dry.      Comments: Skin exam performed at bedside with RN.  No bleeding noted from anywhere in the head or neck.  No bleeding noted from anywhere of the skin.  No blood around the G-tube site   Neurological:      Mental Status: She is alert. Mental status is at baseline.   Psychiatric:         Behavior: Behavior normal.         Thought Content: Thought content normal.         Procedures           ED Course                                             Medical Decision Making  Problems Addressed:  Bleeding: complicated acute illness or injury    Amount and/or Complexity of Data Reviewed  Radiology: ordered.    Namita Zabala is a 48 y.o. female with PMH above who presents to the Emergency Department with concern for bleeding.  Her vital signs are all within normal limits.  For the duration of her transported EMS and her stay in the emergency department we have not noted any blood from anywhere.  She is very unlikely to have an innominate artery bleed because she has had a longstanding trach and there is absolutely no residual blood.  She does not take any blood thinners, and does not have other signs of coagulopathy; her urine is clean and yellow, she has no skin rash or petechia, no ecchymosis or evidence of easy bruising.  Given that her vital signs are normal and she has no increased work of breathing or adventitious lung sounds and I have a low suspicion for a criticla coagulopathy manifesting as isolated, resolved mouth bleeding and I do not see any evidence of active bleeding, I will defer workup at this time other than a chest x-ray and instruct that there be careful monitoring for further bleeding.    -chest x-ray:  No dense consolidation concerning for an acute pulmonary hemorrhage, there is not diffuse findings concerning for diffuse alveolar  hemorrhage.        Final diagnosis: bleeding    All questions answered. Patient/family was understanding and in agreement with today's assessment and plan. The patient was monitored during their stay in the ED and dispositioned without acute event.    Electronically signed by:  Mauro Camarillo MD 7/3/2024 05:44 CDT      Note: Dragon medical dictation software was used in the creation of this note.        Final diagnoses:   Bleeding       ED Disposition  ED Disposition       ED Disposition   Discharge    Condition   Stable    Comment   --               David Lara MD  [address] [place] [address]   [address] [address]  [phone]               Medication List        Changed      liver oil-zinc oxide 40 % ointment  Commonly known as: DESITIN  Apply 1 application  topically to the appropriate area as directed Every 4 (Four) Hours As Needed for Irritation.  What changed: additional instructions                 Mauro Camarillo MD  07/03/24 0544       Mauro Camarillo MD  07/03/24 0533

## 2024-07-14 ENCOUNTER — APPOINTMENT (OUTPATIENT)
Dept: GENERAL RADIOLOGY | Facility: HOSPITAL | Age: 49
End: 2024-07-14
Payer: MEDICARE

## 2024-07-14 ENCOUNTER — HOSPITAL ENCOUNTER (INPATIENT)
Facility: HOSPITAL | Age: 49
LOS: 7 days | Discharge: NURSING, REHAB OR LONG-TERM CARE | End: 2024-07-22
Attending: EMERGENCY MEDICINE | Admitting: INTERNAL MEDICINE
Payer: MEDICARE

## 2024-07-14 DIAGNOSIS — R53.2 FUNCTIONAL QUADRIPLEGIA: ICD-10-CM

## 2024-07-14 DIAGNOSIS — Z93.0 TRACHEOSTOMY PRESENT: ICD-10-CM

## 2024-07-14 DIAGNOSIS — A41.9 SEPSIS, DUE TO UNSPECIFIED ORGANISM, UNSPECIFIED WHETHER ACUTE ORGAN DYSFUNCTION PRESENT: ICD-10-CM

## 2024-07-14 DIAGNOSIS — Z93.1 PRESENCE OF EXTERNALLY REMOVABLE PERCUTANEOUS ENDOSCOPIC GASTROSTOMY (PEG) TUBE: ICD-10-CM

## 2024-07-14 DIAGNOSIS — N39.0 ACUTE UTI (URINARY TRACT INFECTION): Primary | ICD-10-CM

## 2024-07-14 LAB
ALBUMIN SERPL-MCNC: 3.7 G/DL (ref 3.5–5.2)
ALBUMIN/GLOB SERPL: 0.9 G/DL
ALP SERPL-CCNC: 84 U/L (ref 39–117)
ALT SERPL W P-5'-P-CCNC: 14 U/L (ref 1–33)
ANION GAP SERPL CALCULATED.3IONS-SCNC: 11 MMOL/L (ref 5–15)
AST SERPL-CCNC: 14 U/L (ref 1–32)
BACTERIA UR QL AUTO: ABNORMAL /HPF
BASOPHILS # BLD AUTO: 0.07 10*3/MM3 (ref 0–0.2)
BASOPHILS NFR BLD AUTO: 0.4 % (ref 0–1.5)
BILIRUB SERPL-MCNC: 0.7 MG/DL (ref 0–1.2)
BILIRUB UR QL STRIP: NEGATIVE
BUN SERPL-MCNC: 29 MG/DL (ref 6–20)
BUN/CREAT SERPL: 63 (ref 7–25)
CALCIUM SPEC-SCNC: 10.1 MG/DL (ref 8.6–10.5)
CHLORIDE SERPL-SCNC: 104 MMOL/L (ref 98–107)
CLARITY UR: ABNORMAL
CO2 SERPL-SCNC: 26 MMOL/L (ref 22–29)
COARSE GRAN CASTS URNS QL MICRO: ABNORMAL /LPF
COLOR UR: ABNORMAL
CREAT SERPL-MCNC: 0.46 MG/DL (ref 0.57–1)
D-LACTATE SERPL-SCNC: 1.1 MMOL/L (ref 0.5–2)
DEPRECATED RDW RBC AUTO: 44.5 FL (ref 37–54)
EGFRCR SERPLBLD CKD-EPI 2021: 118.9 ML/MIN/1.73
EOSINOPHIL # BLD AUTO: 0.08 10*3/MM3 (ref 0–0.4)
EOSINOPHIL NFR BLD AUTO: 0.4 % (ref 0.3–6.2)
ERYTHROCYTE [DISTWIDTH] IN BLOOD BY AUTOMATED COUNT: 13.2 % (ref 12.3–15.4)
FLUAV RNA RESP QL NAA+PROBE: NOT DETECTED
FLUBV RNA RESP QL NAA+PROBE: NOT DETECTED
GLOBULIN UR ELPH-MCNC: 4 GM/DL
GLUCOSE SERPL-MCNC: 141 MG/DL (ref 65–99)
GLUCOSE UR STRIP-MCNC: NEGATIVE MG/DL
HCT VFR BLD AUTO: 36.5 % (ref 34–46.6)
HGB BLD-MCNC: 11.4 G/DL (ref 12–15.9)
HGB UR QL STRIP.AUTO: ABNORMAL
HYALINE CASTS UR QL AUTO: ABNORMAL /LPF
IMM GRANULOCYTES # BLD AUTO: 0.11 10*3/MM3 (ref 0–0.05)
IMM GRANULOCYTES NFR BLD AUTO: 0.6 % (ref 0–0.5)
KETONES UR QL STRIP: NEGATIVE
LEUKOCYTE ESTERASE UR QL STRIP.AUTO: ABNORMAL
LYMPHOCYTES # BLD AUTO: 1.04 10*3/MM3 (ref 0.7–3.1)
LYMPHOCYTES NFR BLD AUTO: 5.5 % (ref 19.6–45.3)
MCH RBC QN AUTO: 28.6 PG (ref 26.6–33)
MCHC RBC AUTO-ENTMCNC: 31.2 G/DL (ref 31.5–35.7)
MCV RBC AUTO: 91.7 FL (ref 79–97)
MONOCYTES # BLD AUTO: 0.91 10*3/MM3 (ref 0.1–0.9)
MONOCYTES NFR BLD AUTO: 4.8 % (ref 5–12)
NEUTROPHILS NFR BLD AUTO: 16.59 10*3/MM3 (ref 1.7–7)
NEUTROPHILS NFR BLD AUTO: 88.3 % (ref 42.7–76)
NITRITE UR QL STRIP: POSITIVE
NRBC BLD AUTO-RTO: 0 /100 WBC (ref 0–0.2)
PH UR STRIP.AUTO: >=9 [PH] (ref 5–8)
PLATELET # BLD AUTO: 274 10*3/MM3 (ref 140–450)
PMV BLD AUTO: 10.9 FL (ref 6–12)
POTASSIUM SERPL-SCNC: 4.5 MMOL/L (ref 3.5–5.2)
PROT SERPL-MCNC: 7.7 G/DL (ref 6–8.5)
PROT UR QL STRIP: ABNORMAL
RBC # BLD AUTO: 3.98 10*6/MM3 (ref 3.77–5.28)
RBC # UR STRIP: ABNORMAL /HPF
REF LAB TEST METHOD: ABNORMAL
SARS-COV-2 RNA RESP QL NAA+PROBE: NOT DETECTED
SODIUM SERPL-SCNC: 141 MMOL/L (ref 136–145)
SP GR UR STRIP: 1.02 (ref 1–1.03)
SQUAMOUS #/AREA URNS HPF: ABNORMAL /HPF
TRI-PHOS CRY URNS QL MICRO: ABNORMAL /HPF
UROBILINOGEN UR QL STRIP: ABNORMAL
WBC # UR STRIP: ABNORMAL /HPF
WBC NRBC COR # BLD AUTO: 18.8 10*3/MM3 (ref 3.4–10.8)

## 2024-07-14 PROCEDURE — P9612 CATHETERIZE FOR URINE SPEC: HCPCS

## 2024-07-14 PROCEDURE — 85025 COMPLETE CBC W/AUTO DIFF WBC: CPT | Performed by: EMERGENCY MEDICINE

## 2024-07-14 PROCEDURE — 87040 BLOOD CULTURE FOR BACTERIA: CPT | Performed by: EMERGENCY MEDICINE

## 2024-07-14 PROCEDURE — 83605 ASSAY OF LACTIC ACID: CPT | Performed by: EMERGENCY MEDICINE

## 2024-07-14 PROCEDURE — 87636 SARSCOV2 & INF A&B AMP PRB: CPT | Performed by: EMERGENCY MEDICINE

## 2024-07-14 PROCEDURE — 80053 COMPREHEN METABOLIC PANEL: CPT | Performed by: EMERGENCY MEDICINE

## 2024-07-14 PROCEDURE — 87086 URINE CULTURE/COLONY COUNT: CPT | Performed by: EMERGENCY MEDICINE

## 2024-07-14 PROCEDURE — 94799 UNLISTED PULMONARY SVC/PX: CPT

## 2024-07-14 PROCEDURE — 71045 X-RAY EXAM CHEST 1 VIEW: CPT

## 2024-07-14 PROCEDURE — 87154 CUL TYP ID BLD PTHGN 6+ TRGT: CPT | Performed by: EMERGENCY MEDICINE

## 2024-07-14 PROCEDURE — 87077 CULTURE AEROBIC IDENTIFY: CPT | Performed by: EMERGENCY MEDICINE

## 2024-07-14 PROCEDURE — 94761 N-INVAS EAR/PLS OXIMETRY MLT: CPT

## 2024-07-14 PROCEDURE — 99285 EMERGENCY DEPT VISIT HI MDM: CPT

## 2024-07-14 PROCEDURE — 87186 SC STD MICRODIL/AGAR DIL: CPT | Performed by: EMERGENCY MEDICINE

## 2024-07-14 PROCEDURE — 25010000002 CEFTRIAXONE PER 250 MG: Performed by: EMERGENCY MEDICINE

## 2024-07-14 PROCEDURE — 36415 COLL VENOUS BLD VENIPUNCTURE: CPT

## 2024-07-14 PROCEDURE — 81001 URINALYSIS AUTO W/SCOPE: CPT | Performed by: EMERGENCY MEDICINE

## 2024-07-14 PROCEDURE — 87147 CULTURE TYPE IMMUNOLOGIC: CPT | Performed by: EMERGENCY MEDICINE

## 2024-07-14 PROCEDURE — 25810000003 SODIUM CHLORIDE 0.9 % SOLUTION: Performed by: EMERGENCY MEDICINE

## 2024-07-14 RX ORDER — ACETAMINOPHEN 650 MG/1
650 SUPPOSITORY RECTAL ONCE
Status: COMPLETED | OUTPATIENT
Start: 2024-07-14 | End: 2024-07-14

## 2024-07-14 RX ADMIN — ACETAMINOPHEN 650 MG: 650 SUPPOSITORY RECTAL at 22:28

## 2024-07-14 RX ADMIN — SODIUM CHLORIDE 1000 MG: 900 INJECTION INTRAVENOUS at 23:08

## 2024-07-14 RX ADMIN — SODIUM CHLORIDE 1000 ML: 9 INJECTION, SOLUTION INTRAVENOUS at 22:34

## 2024-07-14 NOTE — Clinical Note
Level of Care: Telemetry [5]   Diagnosis: Sepsis [[id number]]   Admitting Physician: DEEPTI SENA [[id number]]   Attending Physician: DEEPTI SENA [[id number]]   Certification: I Certify That Inpatient Hospital Services Are Medically Necessary For Greater Than 2 Midnights

## 2024-07-15 ENCOUNTER — APPOINTMENT (OUTPATIENT)
Dept: GENERAL RADIOLOGY | Facility: HOSPITAL | Age: 49
End: 2024-07-15
Payer: MEDICARE

## 2024-07-15 ENCOUNTER — APPOINTMENT (OUTPATIENT)
Dept: CT IMAGING | Facility: HOSPITAL | Age: 49
End: 2024-07-15
Payer: MEDICARE

## 2024-07-15 PROBLEM — Z93.1 PRESENCE OF EXTERNALLY REMOVABLE PERCUTANEOUS ENDOSCOPIC GASTROSTOMY (PEG) TUBE: Status: ACTIVE | Noted: 2024-07-15

## 2024-07-15 PROBLEM — A41.9 SEPSIS: Status: ACTIVE | Noted: 2024-07-15

## 2024-07-15 PROBLEM — N39.0 SEPSIS SECONDARY TO UTI: Status: ACTIVE | Noted: 2024-07-15

## 2024-07-15 LAB
ANION GAP SERPL CALCULATED.3IONS-SCNC: 8 MMOL/L (ref 5–15)
ANISOCYTOSIS BLD QL: ABNORMAL
BACTERIA BLD CULT: ABNORMAL
BASOPHILS # BLD MANUAL: 0 10*3/MM3 (ref 0–0.2)
BASOPHILS NFR BLD MANUAL: 0 % (ref 0–1.5)
BOTTLE TYPE: ABNORMAL
BUN SERPL-MCNC: 23 MG/DL (ref 6–20)
BUN/CREAT SERPL: 57.5 (ref 7–25)
CALCIUM SPEC-SCNC: 8.8 MG/DL (ref 8.6–10.5)
CHLORIDE SERPL-SCNC: 110 MMOL/L (ref 98–107)
CO2 SERPL-SCNC: 23 MMOL/L (ref 22–29)
CREAT SERPL-MCNC: 0.4 MG/DL (ref 0.57–1)
DEPRECATED RDW RBC AUTO: 45.7 FL (ref 37–54)
EGFRCR SERPLBLD CKD-EPI 2021: 123 ML/MIN/1.73
EOSINOPHIL # BLD MANUAL: 0 10*3/MM3 (ref 0–0.4)
EOSINOPHIL NFR BLD MANUAL: 0 % (ref 0.3–6.2)
ERYTHROCYTE [DISTWIDTH] IN BLOOD BY AUTOMATED COUNT: 13.3 % (ref 12.3–15.4)
GLUCOSE BLDC GLUCOMTR-MCNC: 100 MG/DL (ref 70–130)
GLUCOSE SERPL-MCNC: 113 MG/DL (ref 65–99)
HCT VFR BLD AUTO: 32.7 % (ref 34–46.6)
HGB BLD-MCNC: 10.3 G/DL (ref 12–15.9)
LYMPHOCYTES # BLD MANUAL: 0.54 10*3/MM3 (ref 0.7–3.1)
LYMPHOCYTES NFR BLD MANUAL: 6.1 % (ref 5–12)
MCH RBC QN AUTO: 29.5 PG (ref 26.6–33)
MCHC RBC AUTO-ENTMCNC: 31.5 G/DL (ref 31.5–35.7)
MCV RBC AUTO: 93.7 FL (ref 79–97)
MONOCYTES # BLD: 1.09 10*3/MM3 (ref 0.1–0.9)
NEUTROPHILS # BLD AUTO: 16.22 10*3/MM3 (ref 1.7–7)
NEUTROPHILS NFR BLD MANUAL: 85.9 % (ref 42.7–76)
NEUTS BAND NFR BLD MANUAL: 5.1 % (ref 0–5)
PLAT MORPH BLD: NORMAL
PLATELET # BLD AUTO: 207 10*3/MM3 (ref 140–450)
PMV BLD AUTO: 11.5 FL (ref 6–12)
POTASSIUM SERPL-SCNC: 3.5 MMOL/L (ref 3.5–5.2)
RBC # BLD AUTO: 3.49 10*6/MM3 (ref 3.77–5.28)
SODIUM SERPL-SCNC: 141 MMOL/L (ref 136–145)
VARIANT LYMPHS NFR BLD MANUAL: 3 % (ref 19.6–45.3)
WBC MORPH BLD: NORMAL
WBC NRBC COR # BLD AUTO: 17.84 10*3/MM3 (ref 3.4–10.8)

## 2024-07-15 PROCEDURE — 99221 1ST HOSP IP/OBS SF/LOW 40: CPT | Performed by: UROLOGY

## 2024-07-15 PROCEDURE — 25010000002 KETOROLAC TROMETHAMINE PER 15 MG

## 2024-07-15 PROCEDURE — 74177 CT ABD & PELVIS W/CONTRAST: CPT

## 2024-07-15 PROCEDURE — 74018 RADEX ABDOMEN 1 VIEW: CPT

## 2024-07-15 PROCEDURE — 99497 ADVNCD CARE PLAN 30 MIN: CPT

## 2024-07-15 PROCEDURE — 94799 UNLISTED PULMONARY SVC/PX: CPT

## 2024-07-15 PROCEDURE — 94761 N-INVAS EAR/PLS OXIMETRY MLT: CPT

## 2024-07-15 PROCEDURE — 80048 BASIC METABOLIC PNL TOTAL CA: CPT | Performed by: FAMILY MEDICINE

## 2024-07-15 PROCEDURE — 25010000002 ENOXAPARIN PER 10 MG: Performed by: FAMILY MEDICINE

## 2024-07-15 PROCEDURE — 87102 FUNGUS ISOLATION CULTURE: CPT | Performed by: FAMILY MEDICINE

## 2024-07-15 PROCEDURE — 85025 COMPLETE CBC W/AUTO DIFF WBC: CPT | Performed by: FAMILY MEDICINE

## 2024-07-15 PROCEDURE — 25010000002 CEFTRIAXONE PER 250 MG: Performed by: FAMILY MEDICINE

## 2024-07-15 PROCEDURE — 99222 1ST HOSP IP/OBS MODERATE 55: CPT

## 2024-07-15 PROCEDURE — 85007 BL SMEAR W/DIFF WBC COUNT: CPT | Performed by: FAMILY MEDICINE

## 2024-07-15 PROCEDURE — 99498 ADVNCD CARE PLAN ADDL 30 MIN: CPT

## 2024-07-15 PROCEDURE — 82948 REAGENT STRIP/BLOOD GLUCOSE: CPT

## 2024-07-15 PROCEDURE — 25810000003 SODIUM CHLORIDE 0.9 % SOLUTION: Performed by: EMERGENCY MEDICINE

## 2024-07-15 PROCEDURE — 25510000001 IOPAMIDOL 61 % SOLUTION: Performed by: EMERGENCY MEDICINE

## 2024-07-15 PROCEDURE — 25810000003 SODIUM CHLORIDE 0.9 % SOLUTION: Performed by: FAMILY MEDICINE

## 2024-07-15 RX ORDER — SODIUM CHLORIDE 0.9 % (FLUSH) 0.9 %
10 SYRINGE (ML) INJECTION AS NEEDED
Status: DISCONTINUED | OUTPATIENT
Start: 2024-07-15 | End: 2024-07-22 | Stop reason: HOSPADM

## 2024-07-15 RX ORDER — BACLOFEN 10 MG/1
10 TABLET ORAL 3 TIMES DAILY
Status: DISCONTINUED | OUTPATIENT
Start: 2024-07-15 | End: 2024-07-16

## 2024-07-15 RX ORDER — IPRATROPIUM BROMIDE AND ALBUTEROL SULFATE 2.5; .5 MG/3ML; MG/3ML
3 SOLUTION RESPIRATORY (INHALATION) EVERY 4 HOURS PRN
Status: DISCONTINUED | OUTPATIENT
Start: 2024-07-15 | End: 2024-07-22 | Stop reason: HOSPADM

## 2024-07-15 RX ORDER — SCOLOPAMINE TRANSDERMAL SYSTEM 1 MG/1
1 PATCH, EXTENDED RELEASE TRANSDERMAL
Status: DISCONTINUED | OUTPATIENT
Start: 2024-07-15 | End: 2024-07-22 | Stop reason: HOSPADM

## 2024-07-15 RX ORDER — NYSTATIN 100000 [USP'U]/G
1 POWDER TOPICAL 2 TIMES DAILY
Status: DISCONTINUED | OUTPATIENT
Start: 2024-07-15 | End: 2024-07-22 | Stop reason: HOSPADM

## 2024-07-15 RX ORDER — ACETAMINOPHEN 650 MG/1
650 SUPPOSITORY RECTAL EVERY 4 HOURS PRN
Status: DISCONTINUED | OUTPATIENT
Start: 2024-07-15 | End: 2024-07-22 | Stop reason: HOSPADM

## 2024-07-15 RX ORDER — ACETAMINOPHEN 325 MG/1
650 TABLET ORAL EVERY 4 HOURS PRN
Status: DISCONTINUED | OUTPATIENT
Start: 2024-07-15 | End: 2024-07-22 | Stop reason: HOSPADM

## 2024-07-15 RX ORDER — CHLORHEXIDINE GLUCONATE ORAL RINSE 1.2 MG/ML
15 SOLUTION DENTAL 2 TIMES DAILY
Status: DISCONTINUED | OUTPATIENT
Start: 2024-07-15 | End: 2024-07-22 | Stop reason: HOSPADM

## 2024-07-15 RX ORDER — POLYETHYLENE GLYCOL 3350 17 G/17G
17 POWDER, FOR SOLUTION ORAL DAILY
Status: DISCONTINUED | OUTPATIENT
Start: 2024-07-15 | End: 2024-07-22 | Stop reason: HOSPADM

## 2024-07-15 RX ORDER — ENOXAPARIN SODIUM 100 MG/ML
40 INJECTION SUBCUTANEOUS DAILY
Status: DISCONTINUED | OUTPATIENT
Start: 2024-07-15 | End: 2024-07-22 | Stop reason: HOSPADM

## 2024-07-15 RX ORDER — BISACODYL 10 MG
10 SUPPOSITORY, RECTAL RECTAL DAILY
Status: DISCONTINUED | OUTPATIENT
Start: 2024-07-15 | End: 2024-07-18 | Stop reason: ALTCHOICE

## 2024-07-15 RX ORDER — ENEMA 19; 7 G/133ML; G/133ML
1 ENEMA RECTAL
Status: DISCONTINUED | OUTPATIENT
Start: 2024-07-15 | End: 2024-07-22 | Stop reason: HOSPADM

## 2024-07-15 RX ORDER — ONDANSETRON HYDROCHLORIDE 4 MG/5ML
4 SOLUTION ORAL EVERY 6 HOURS PRN
Status: DISCONTINUED | OUTPATIENT
Start: 2024-07-15 | End: 2024-07-22 | Stop reason: HOSPADM

## 2024-07-15 RX ORDER — FLUTICASONE PROPIONATE 50 MCG
2 SPRAY, SUSPENSION (ML) NASAL EVERY MORNING
Status: DISCONTINUED | OUTPATIENT
Start: 2024-07-16 | End: 2024-07-22 | Stop reason: HOSPADM

## 2024-07-15 RX ORDER — BISACODYL 10 MG
10 SUPPOSITORY, RECTAL RECTAL
Status: DISCONTINUED | OUTPATIENT
Start: 2024-07-15 | End: 2024-07-18

## 2024-07-15 RX ORDER — SODIUM CHLORIDE 0.9 % (FLUSH) 0.9 %
10 SYRINGE (ML) INJECTION EVERY 12 HOURS SCHEDULED
Status: DISCONTINUED | OUTPATIENT
Start: 2024-07-15 | End: 2024-07-22 | Stop reason: HOSPADM

## 2024-07-15 RX ORDER — SIMETHICONE 40MG/0.6ML
20 SUSPENSION, DROPS(FINAL DOSAGE FORM)(ML) ORAL EVERY 6 HOURS
Status: DISCONTINUED | OUTPATIENT
Start: 2024-07-15 | End: 2024-07-22 | Stop reason: HOSPADM

## 2024-07-15 RX ORDER — ONDANSETRON 2 MG/ML
4 INJECTION INTRAMUSCULAR; INTRAVENOUS EVERY 6 HOURS PRN
Status: DISCONTINUED | OUTPATIENT
Start: 2024-07-15 | End: 2024-07-22 | Stop reason: HOSPADM

## 2024-07-15 RX ORDER — SODIUM CHLORIDE 9 MG/ML
40 INJECTION, SOLUTION INTRAVENOUS AS NEEDED
Status: DISCONTINUED | OUTPATIENT
Start: 2024-07-15 | End: 2024-07-22 | Stop reason: HOSPADM

## 2024-07-15 RX ORDER — KETOROLAC TROMETHAMINE 30 MG/ML
30 INJECTION, SOLUTION INTRAMUSCULAR; INTRAVENOUS EVERY 6 HOURS PRN
Status: DISPENSED | OUTPATIENT
Start: 2024-07-15 | End: 2024-07-16

## 2024-07-15 RX ORDER — GUAIFENESIN 200 MG/10ML
600 LIQUID ORAL 2 TIMES DAILY
Status: DISCONTINUED | OUTPATIENT
Start: 2024-07-15 | End: 2024-07-22 | Stop reason: HOSPADM

## 2024-07-15 RX ORDER — AMOXICILLIN 250 MG
2 CAPSULE ORAL 2 TIMES DAILY
Status: DISCONTINUED | OUTPATIENT
Start: 2024-07-15 | End: 2024-07-22 | Stop reason: HOSPADM

## 2024-07-15 RX ORDER — SODIUM CHLORIDE 9 MG/ML
100 INJECTION, SOLUTION INTRAVENOUS CONTINUOUS
Status: DISCONTINUED | OUTPATIENT
Start: 2024-07-15 | End: 2024-07-18

## 2024-07-15 RX ORDER — ECHINACEA PURPUREA EXTRACT 125 MG
2 TABLET ORAL AS NEEDED
Status: DISCONTINUED | OUTPATIENT
Start: 2024-07-15 | End: 2024-07-22 | Stop reason: HOSPADM

## 2024-07-15 RX ORDER — ACETAMINOPHEN 160 MG/5ML
650 SOLUTION ORAL EVERY 4 HOURS PRN
Status: DISCONTINUED | OUTPATIENT
Start: 2024-07-15 | End: 2024-07-22 | Stop reason: HOSPADM

## 2024-07-15 RX ORDER — ZINC OXIDE 20 %
OINTMENT (GRAM) TOPICAL EVERY 4 HOURS PRN
Status: DISCONTINUED | OUTPATIENT
Start: 2024-07-15 | End: 2024-07-22 | Stop reason: HOSPADM

## 2024-07-15 RX ORDER — ROSUVASTATIN CALCIUM 5 MG/1
5 TABLET, COATED ORAL NIGHTLY
Status: DISCONTINUED | OUTPATIENT
Start: 2024-07-15 | End: 2024-07-22 | Stop reason: HOSPADM

## 2024-07-15 RX ORDER — CETIRIZINE HYDROCHLORIDE 10 MG/1
10 TABLET ORAL DAILY
Status: DISCONTINUED | OUTPATIENT
Start: 2024-07-15 | End: 2024-07-22 | Stop reason: HOSPADM

## 2024-07-15 RX ORDER — METOPROLOL TARTRATE 1 MG/ML
2.5 INJECTION, SOLUTION INTRAVENOUS EVERY 12 HOURS
Status: DISCONTINUED | OUTPATIENT
Start: 2024-07-15 | End: 2024-07-16

## 2024-07-15 RX ADMIN — BACLOFEN 10 MG: 10 TABLET ORAL at 11:16

## 2024-07-15 RX ADMIN — BACLOFEN 10 MG: 10 TABLET ORAL at 17:23

## 2024-07-15 RX ADMIN — ACETAMINOPHEN 650 MG: 650 SUPPOSITORY RECTAL at 05:22

## 2024-07-15 RX ADMIN — POLYETHYLENE GLYCOL 3350 17 G: 17 POWDER, FOR SOLUTION ORAL at 11:18

## 2024-07-15 RX ADMIN — CEFTRIAXONE SODIUM 2000 MG: 2 INJECTION, POWDER, FOR SOLUTION INTRAMUSCULAR; INTRAVENOUS at 21:22

## 2024-07-15 RX ADMIN — Medication 20 MG: at 16:56

## 2024-07-15 RX ADMIN — ROSUVASTATIN CALCIUM 5 MG: 5 TABLET, FILM COATED ORAL at 21:22

## 2024-07-15 RX ADMIN — BISACODYL 10 MG: 10 SUPPOSITORY RECTAL at 11:16

## 2024-07-15 RX ADMIN — SODIUM CHLORIDE 100 ML/HR: 9 INJECTION, SOLUTION INTRAVENOUS at 03:45

## 2024-07-15 RX ADMIN — CHLORHEXIDINE GLUCONATE 0.12% ORAL RINSE 15 ML: 1.2 LIQUID ORAL at 21:22

## 2024-07-15 RX ADMIN — NYSTATIN 1 APPLICATION: 100000 POWDER TOPICAL at 21:23

## 2024-07-15 RX ADMIN — IOPAMIDOL 100 ML: 612 INJECTION, SOLUTION INTRAVENOUS at 00:31

## 2024-07-15 RX ADMIN — SODIUM CHLORIDE 1000 ML: 9 INJECTION, SOLUTION INTRAVENOUS at 01:11

## 2024-07-15 RX ADMIN — GUAIFENESIN 600 MG: 200 SOLUTION ORAL at 21:22

## 2024-07-15 RX ADMIN — Medication 10 ML: at 11:22

## 2024-07-15 RX ADMIN — Medication 20 MG: at 21:23

## 2024-07-15 RX ADMIN — ENOXAPARIN SODIUM 40 MG: 100 INJECTION SUBCUTANEOUS at 11:27

## 2024-07-15 RX ADMIN — METOPROLOL TARTRATE 2.5 MG: 5 INJECTION INTRAVENOUS at 17:14

## 2024-07-15 RX ADMIN — SODIUM CHLORIDE 100 ML/HR: 9 INJECTION, SOLUTION INTRAVENOUS at 16:55

## 2024-07-15 RX ADMIN — KETOROLAC TROMETHAMINE 30 MG: 30 INJECTION, SOLUTION INTRAMUSCULAR; INTRAVENOUS at 17:00

## 2024-07-15 RX ADMIN — CETIRIZINE HYDROCHLORIDE 10 MG: 10 TABLET ORAL at 11:15

## 2024-07-15 RX ADMIN — BACLOFEN 10 MG: 10 TABLET ORAL at 21:22

## 2024-07-15 RX ADMIN — GUAIFENESIN 600 MG: 200 SOLUTION ORAL at 11:18

## 2024-07-15 RX ADMIN — SCOPALAMINE 1 PATCH: 1 PATCH, EXTENDED RELEASE TRANSDERMAL at 14:59

## 2024-07-15 NOTE — CASE MANAGEMENT/SOCIAL WORK
Discharge Planning Assessment  [facility]     Patient Name: Namita Zabala  MRN: [id number]  Today's Date: 7/15/2024    Admit Date: 7/14/2024        Discharge Needs Assessment       Row Name 07/15/24 1528       Living Environment    People in Home facility resident    Current Living Arrangements extended care facility    Potentially Unsafe Housing Conditions none    Primary Care Provided by other (see comments)    Provides Primary Care For no one, unable/limited ability to care for self    Family Caregiver if Needed parent(s)    Quality of Family Relationships helpful;involved;supportive    Able to Return to Prior Arrangements yes       Resource/Environmental Concerns    Resource/Environmental Concerns none       Transition Planning    Patient/Family Anticipates Transition to inpatient rehabilitation facility    Patient/Family Anticipated Services at Transition skilled nursing    Transportation Anticipated health plan transportation       Discharge Needs Assessment    Readmission Within the Last 30 Days no previous admission in last 30 days    Current Outpatient/Agency/Support Group skilled nursing facility    Concerns to be Addressed discharge planning;care coordination/care conferences    Outpatient/Agency/Support Group Needs skilled nursing facility    Discharge Facility/Level of Care Needs nursing facility, intermediate    Provided Post Acute Provider List? Yes    Post Acute Provider List Nursing Home    Provided Post Acute Provider Quality & Resource List? Yes    Post Acute Provider Quality and Resource List Nursing Home    Delivered To Support Person    Support Person Parents    Method of Delivery In person    Current Discharge Risk physical impairment;cognitively impaired;chronically ill;dependent with mobility/activities of daily living    Discharge Coordination/Progress Pt is from [facility]. Pt's spouse recently passed away unexpectedly and her parents are working on getting  guardianship. They are requesting referrals being made to other local facilities. Referrals being sent to [facility], [facility], [facility], [facility], [facility], and [facility].                   Discharge Plan    No documentation.                 Continued Care and Services - Admitted Since 7/14/2024    No active coordination exists for this encounter.          Demographic Summary    No documentation.                  Functional Status    No documentation.                  Psychosocial    No documentation.                  Abuse/Neglect    No documentation.                  Legal    No documentation.                  Substance Abuse    No documentation.                  Patient Forms    No documentation.                     ORAL Mcintyre

## 2024-07-15 NOTE — CONSULTS
[facility] Palliative Care Services  Initial Consult    Attending Physician: Tae Charles MD  Referring Provider:  Montserrat Feliciano APRN    Patient Name: Namita Zabala  Date of Admission: 7/14/2024  Today's Date: 07/15/24     Reason for Referral: Goals of Care/Advance Care Planning and Support for Patient/Family    Code Status and Medical Interventions:   Ordered at: 07/15/24 0243     Medical Intervention Limits:    No intubation (DNI)    No cardioversion     Code Status (Patient has no pulse and is not breathing):    No CPR (Do Not Attempt to Resuscitate)     Medical Interventions (Patient has pulse or is breathing):    Limited Support        Subjective     HPI: 49 y.o. female with past medical history including anoxic brain damage, chronic respiratory failure, presence of tracheostomy, recurrent UTI, dysphagia s/p PEG tube, mitral valve prolapse, renal disorder, ruptured bladder and syncope.  Additional past medical history listed below.  According to chart review she has had numerous ED visits and hospitalizations over the last year.  She has been evaluated by palliative care team during hospitalizations.  Patient presented to [facility] on 7/14/2024 related to clogged PEG tube.  Chart review also notes she had a fever with EMS and hypoxia.  She is a resident at [facility].  According to chart review tracheostomy was extremely clogged with debris and dried secretions and once cleaned and replaced was functional.  Also noted PEG tube had debris however was in proper alignment.  Work-up in ED revealed few abnormalities in labs including BUN 29, glucose 141, WBC 18.80 and hemoglobin 11.4.  UA revealed 4+ bacteria, 2+ blood and presence of nitrite.  Urine culture pending.  Chest x-ray revealed possible left lower lobar consolidation/infiltrate which may represent an inflammatory/infectious process.  Blood cultures collected and pending.  CT of abdomen and pelvis revealed  moderate thickening and enhancement of the mucosa/urothelium of the collecting system bilaterally however no evidence of acute pyelonephritis.  Visualized bilateral staghorn calculi without finding to suggest obstruction, significant large volume stool in distal colon, mildly dilated fluid-filled small bowel loops which may represent ileus or acute nonspecific enteritis.  She was admitted to the medical floor for further workup and treatment.  Urology consulted and recommended treating UTI with minimum of 14 days of culture specific antibiotics.  Noted patient would need to follow-up at [facility] given complexity of medical conditions however noted concerns she might not be a candidate for interventions and recommended palliative care consult.  PT/OT consulted however signed off as patient was noted to be at her baseline which is nonverbal and dependent for all care.  Labs collected this morning reviewed.  WBC trending downward however remains elevated at 17.84.  Palliative care has been consulted to discuss goals of care/advance care planning.  She is lying in bed, awake and in no apparent distress.  Moves eyes spontaneously however unable to follow commands at time of exam.  Her parents are at bedside.  Discussed with nursing.     Advance Care Planning   Advanced Directives: No advance directive on file.     Advance Care Planning Discussion: Ms. Zabala is nonverbal and unable to demonstrate ability to make complex medical decisions.  Extensive discussion held with her parents, Marquita and Noel, at bedside.  They are now her next of kin as her spouse recently passed away unexpectedly.  She has no children.  Parents report they are in the process of obtaining guardianship.  Discussed events leading to current hospitalization.  Parents provided additional past medical history and reflected on all of the extensive hospitalizations and interventions she has undergone over the last 3 years.  Shared she is  "essentially been in the hospital or long-term facility since.  We discussed concerns regarding overall condition and high risk for hospitalizations.  Explored previous discussions with providers and understanding of prognosis.  Shared her spouse typically was the one to communicate with providers/facilities and would relay messages to them.  Since he has passed away they are now responsible for her care.  We again discussed concerns for further decline in overall condition in addition to her underlying complex medical comorbidities and poor long-term prognosis.  She is dependent in all ADLs, nonverbal, bedbound with multiple contractures.  Parents shared they were and still are praying for miracle however also appear realistic.  We discussed medical priorities including CPR.  Report it is hard decision to make however feel she has \"been through enough and do not want to make her suffer longer.\"  We also discussed transitioning to more comfort focused care including details and expectations.  Hospice was also discussed.  Father shared concerns that her tube feeds will be stopped.  Explained these could be continued with hospice services for comfort.  Explained the goal is to focus on comfort/symptom management rather than life-prolonging interventions, hospitalizations with workups, etc.  They both appear to have good understanding.  Shared they do not feel Namita would wish to continue aggressive measures in her current state if she were able to make that decision.  Plans to discuss further.  Wish to continue with current measures at this time.  They were both appreciative of care and conversation however had to cut short due to going to their son-in-law's visitation.  Denied any questions and or concerns at this time.  Support provided.    The patient receives support from her parents. Patient's parents are her next of kin.    Due to the palliative care topics discussed including goals of care, treatment " options, discharge options, medical priorities, and hospice services we will establish an advance care plan.      Review of Systems   Unable to perform ROS: Patient nonverbal     Pain Assessment  PAINAD Breathin-->normal  PAINAD Negative Vocalization: 0-->none  PAINAD Facial Expression: 0-->smiling or inexpressive  PAINAD Body Language: 0-->relaxed  PAINAD Consolability: 0-->no need to console  PAINAD Score: 0  Past Medical History:   Diagnosis Date    Acute kidney failure, unspecified     Angina at rest     Anoxic brain damage, not elsewhere classified     Chronic pulmonary embolism     Chronic respiratory failure, unspecified whether with hypoxia or hypercapnia     Dependence on respirator (ventilator) status     Gastrointestinal hemorrhage, unspecified     Hypercalcemia     Iron deficiency anemia     Metabolic encephalopathy     Migraine     Sees Pain Management for injections.     MVP (mitral valve prolapse)     Other dysphagia     Other pulmonary embolism with acute cor pulmonale     Renal disorder     Ruptured bladder     Syncope     Urinary tract infection, site not specified       Past Surgical History:   Procedure Laterality Date    ABDOMINAL SURGERY      BREAST BIOPSY Right     benign    GTUBE INSERTION      INSERTION HEMODIALYSIS CATHETER Left 2021    Procedure: HEMODIALYSIS CATHETER INSERTION;  Surgeon: Anders Kaur MD;  Location: [place] [id number];  Service: Vascular;  Laterality: Left;    TONSILLECTOMY      TRACHEOSTOMY        Social History     Socioeconomic History    Marital status:    Tobacco Use    Smoking status: Never    Smokeless tobacco: Never   Vaping Use    Vaping status: Never Used   Substance and Sexual Activity    Alcohol use: No    Drug use: No    Sexual activity: Defer     Birth control/protection: OCP     Family History   Problem Relation Age of Onset    Colon cancer Maternal Aunt 52    Fibromyalgia Mother     Heart disease Mother     Hypertension  Mother     Hodgkin's lymphoma Paternal Grandmother     Ovarian cancer Neg Hx     Breast cancer Neg Hx       Allergies   Allergen Reactions    Coconut Unknown - High Severity    Levaquin [Levofloxacin] Other (See Comments)     unknown    Nuts Unknown - High Severity    Penicillins Rash     Patient's father noted patient had taken penicillin, many years ago, many times and then started developing a rash when she would take it.  She has received cefepime, ceftriaxone and cephalexin with no known adverse problems    Tur[place] Other (See Comments)     Causes migraines per pt reports       Objective   Diagnostics: Reviewed    Intake/Output Summary (Last 24 hours) at 7/15/2024 1404  Last data filed at 7/15/2024 0900  Gross per 24 hour   Intake 1100 ml   Output --   Net 1100 ml       Current medications patient is presently taking including all prescriptions, over-the-counter, herbals and vitamin/mineral/dietary (nutritional) supplements with reviewed including route, type, dose and frequency and are current per MAR at time of dictation.  Current Facility-Administered Medications   Medication Dose Route Frequency Provider Last Rate Last Admin    acetaminophen (TYLENOL) tablet 650 mg  650 mg Per G Tube Q4H PRN David Hernández MD        Or    acetaminophen (TYLENOL) 160 MG/5ML oral solution 650 mg  650 mg Per G Tube Q4H PRN David Hernández MD        Or    acetaminophen (TYLENOL) suppository 650 mg  650 mg Rectal Q4H PRN David Hernández MD   650 mg at 07/15/24 0522    artificial tears ophthalmic ointment   Both Eyes Q1H PRN Montserrat Feliciano APRN        baclofen (LIORESAL) tablet 10 mg  10 mg Per G Tube TID David Hernández MD   10 mg at 07/15/24 1116    bisacodyl (DULCOLAX) suppository 10 mg  10 mg Rectal Q48H PRN David Heránndez MD        bisacodyl (DULCOLAX) suppository 10 mg  10 mg Rectal Daily Montserrat Feliciano APRN   10 mg at 07/15/24 1116    cefTRIAXone (ROCEPHIN) 2,000 mg in  sodium chloride 0.9 % 100 mL MBP  2,000 mg Intravenous Q24H David Hernández MD        cetirizine (zyrTEC) tablet 10 mg  10 mg Per G Tube Daily David Hernández MD   10 mg at 07/15/24 1115    chlorhexidine (PERIDEX) 0.12 % solution 15 mL  15 mL Mouth/Throat BID Montserrat Feliciano APRN        Enoxaparin Sodium (LOVENOX) syringe 40 mg  40 mg Subcutaneous Daily David Hernández MD   40 mg at 07/15/24 1127    fleet enema 1 enema  1 enema Rectal Q48H PRN David Hernández MD        [START ON 7/16/2024] fluticasone (FLONASE) 50 MCG/ACT nasal spray 2 spray  2 spray Nasal QAM Montserrat Feliciano APRN        guaifenesin (ROBITUSSIN) 100 MG/5ML liquid 600 mg  600 mg Per G Tube BID David Hernández MD   600 mg at 07/15/24 1118    ipratropium-albuterol (DUO-NEB) nebulizer solution 3 mL  3 mL Nebulization Q4H PRN David Hernández MD        nystatin (MYCOSTATIN) powder 1 Application  1 Application Topical BID Montserrat Feliciano APRN        ondansetron (ZOFRAN) 4 MG/5ML oral solution 4 mg  4 mg Oral Q6H PRN Montserrat Feliciano APRN        ondansetron (ZOFRAN) injection 4 mg  4 mg Intravenous Q6H PRN David Hernández MD        polyethylene glycol (MIRALAX) packet 17 g  17 g Per G Tube Daily David Hernández MD   17 g at 07/15/24 1118    rosuvastatin (CRESTOR) tablet 5 mg  5 mg Per G Tube Nightly David Hernández MD        scopolamine patch 1 mg/72 hr  1 patch Transdermal Q72H Montserrat Feliciano APRN        sennosides-docusate (PERICOLACE) 8.6-50 MG per tablet 2 tablet  2 tablet Oral BID Montserrat Feliciano APRN        simethicone (MYLICON) 40 MG/0.6ML drops 20 mg  20 mg Per G Tube Q6H , BERT Mariano        sodium chloride 0.9 % flush 10 mL  10 mL Intravenous Q12H David Hernández MD   10 mL at 07/15/24 1122    sodium chloride 0.9 % flush 10 mL  10 mL Intravenous PRN David Hernández MD        sodium chloride 0.9 % infusion 40 mL  40 mL Intravenous  PRN David Hernández MD        sodium chloride 0.9 % infusion  100 mL/hr Intravenous Continuous David Hernández  mL/hr at 07/15/24 0345 100 mL/hr at 07/15/24 0345    zinc oxide 20 % ointment   Topical Q4H PRN Montserrat APRN        sodium chloride, 100 mL/hr, Last Rate: 100 mL/hr (07/15/24 0345)       acetaminophen **OR** acetaminophen **OR** acetaminophen    artificial tears    bisacodyl    fleet enema    ipratropium-albuterol    ondansetron    ondansetron    sodium chloride    sodium chloride    zinc oxide  Assessment   /74 (BP Location: Right leg, Patient Position: Lying)   Pulse 111   Temp 98.8 °F (37.1 °C) (Oral)   Resp 16   Wt 59 kg (130 lb 1.1 oz)   LMP  (LMP Unknown)   SpO2 97%   BMI 23.04 kg/m²     Physical Exam  Vitals and nursing note reviewed.   Constitutional:       General: She is awake. She is not in acute distress.     Appearance: She is ill-appearing.   Eyes:      General: Lids are normal.   Neck:      Vascular: No JVD.      Trachea: Tracheostomy present.   Cardiovascular:      Rate and Rhythm: Tachycardia present.   Pulmonary:      Comments: 8 L oxygen via trach collar.   Abdominal:      Comments: PEG tube present with tube feeds infusing.    Genitourinary:     Comments: Indwelling stratton catheter present.   Musculoskeletal:      Comments: Contractures of extremities.   Skin:     General: Skin is warm and dry.   Psychiatric:         Speech: She is noncommunicative.     Functional status: Palliative Performance Scale Score: Performance 10% based on the following measures: Ambulation: Totally bed bound, Activity and Evidence of Disease: Unable to do any work, extensive evidence of disease, Self-Care: Total care required,  Intake: Mouth care only, LOC: Drowsy or comatose.  Nutritional status: Albumin 3.7. Body mass index is 23.04 kg/m²..  Patient status: Disease state: Controlled with current treatments.    Active Hospital Problems    Diagnosis     **Sepsis  secondary to UTI     Presence of externally removable percutaneous endoscopic gastrostomy (PEG) tube     History of anoxic brain injury     Functional quadriplegia     Tracheostomy present     Ileus      Impression/Problem List:  Sepsis secondary to urinary tract infection  History of anoxic brain injury  Functional quadriplegia  Tracheostomy present  Presence of PEG tube  Ileus    Plan / Recommendations     Palliative Care Encounter   Goals of care include CODE STATUS NO CPR with limited support interventions.    Prognosis is poor long-term secondary to sepsis due to UTI, anoxic brain injury, functional quadriplegia, presence of tracheostomy and PEG tube, ileus and other comorbidities listed above.    Ms. Zabala is nonverbal and unable to demonstrate ability to make complex medical decisions.       Extensive discussion held with her parents, Nash, at bedside.  Details of discussion above.    Discussed concerns for further decline in overall condition in addition to her underlying complex medical comorbidities and poor long-term prognosis.  She is dependent in all ADLs, nonverbal, bedbound and has multiple contractures.     Treatment options also discussed including continuing current measures versus transition to more comfort focused care.  Hospice was also discussed.    Marquita and Noel appear to have good understanding of the complexity of Namita's condition.  Reports they do not feel she would wish to continue aggressive measures in her current state.  They plan to discuss treatment options further.  Discussed with nursing and hospitalist APRN.     Appreciative of discussion. Denied any questions and or concerns at this time.  Support provided.      Thank you for allowing us to participate in patient's plan of care. Palliative Care Team will continue to follow patient. Will plan to follow up tomorrow or sooner if needed.    Time spent:105 minutes spent reviewing medical and medication records,  assessing and examining patient, discussing with family, answering questions, providing some guidance about a plan and documentation of care, and coordinating care with other healthcare members, with > 50% time spent face to face.   50 minutes spent on advance care planning.    Electronically signed by, BERT White, 07/15/24.

## 2024-07-15 NOTE — H&P
[facility] Medicine Services  HISTORY AND PHYSICAL    Date of Admission: 7/14/2024  Primary Care Physician: David Lara MD    Subjective   Primary Historian: ER attending    Chief Complaint: Transfer from [facility] with reports of clogged PEG tube and displaced tracheostomy    History of Present Illness  49-year-old female with past medical history of anoxic brain damage, tracheostomy, PEG tube, chronic respiratory failure, pulmonary embolism, recurrent UTI, staghorn renal calculus, the presents to the emergency room with fever, tachycardia and hypotension.  Blood work shows WBC of 18.8 and urinalysis with turbid urine blood, nitrate, leukocytes and bacteria.  She has history of nephrostomy and has CT abdomen was obtained in the ER apparently this has has been removed.  She followed at [facility].    Review of Systems   Otherwise complete ROS reviewed and negative except as mentioned in the HPI.    Past Medical History:   Past Medical History:   Diagnosis Date    Acute kidney failure, unspecified     Angina at rest     Anoxic brain damage, not elsewhere classified     Chronic pulmonary embolism     Chronic respiratory failure, unspecified whether with hypoxia or hypercapnia     Dependence on respirator (ventilator) status     Gastrointestinal hemorrhage, unspecified     Hypercalcemia     Iron deficiency anemia     Metabolic encephalopathy     Migraine     Sees Pain Management for injections.     MVP (mitral valve prolapse)     Other dysphagia     Other pulmonary embolism with acute cor pulmonale     Renal disorder     Ruptured bladder     Syncope     Urinary tract infection, site not specified      Past Surgical History:  Past Surgical History:   Procedure Laterality Date    ABDOMINAL SURGERY      BREAST BIOPSY Right 2011    benign    GTUBE INSERTION      INSERTION HEMODIALYSIS CATHETER Left 09/16/2021    Procedure: HEMODIALYSIS CATHETER INSERTION;   Surgeon: Anders Kaur MD;  Location: [facility] OR 12;  Service: Vascular;  Laterality: Left;    TONSILLECTOMY      TRACHEOSTOMY       Social History:  reports that she has never smoked. She has never used smokeless tobacco. She reports that she does not drink alcohol and does not use drugs.    Family History: family history includes Colon cancer (age of onset: 52) in her maternal aunt; Fibromyalgia in her mother; Heart disease in her mother; Hodgkin's lymphoma in her paternal grandmother; Hypertension in her mother.       Allergies:  Allergies   Allergen Reactions    Coconut Unknown - High Severity    Levaquin [Levofloxacin] Other (See Comments)     unknown    Nuts Unknown - High Severity    Penicillins Rash     Patient's father noted patient had taken penicillin, many years ago, many times and then started developing a rash when she would take it.  She has received cefepime, ceftriaxone and cephalexin with no known adverse problems    Turkey Other (See Comments)     Causes migraines per pt reports       Medications:  Prior to Admission medications    Medication Sig Start Date End Date Taking? Authorizing Provider   acetaminophen (TYLENOL) 500 MG tablet Administer 1 tablet per G tube Every 4 (Four) Hours As Needed for Fever. Not to exceed 3000mg from all sources daily    Odalys Mullen MD   baclofen (LIORESAL) 20 MG tablet Administer 0.5 tablets per G tube 3 (Three) Times a Day. 4/8/24   Mik Meeks,    bisacodyl (DULCOLAX) 10 MG suppository Insert 1 suppository into the rectum Every Other Day As Needed for Constipation.    Odalys Mullen MD   carboxymethylcellulose (REFRESH PLUS) 0.5 % solution Administer 1 drop to both eyes 2 (Two) Times a Day.    Odalys Mullen MD   cetirizine (zyrTEC) 10 MG tablet Take 1 tablet by mouth Daily.    Odalys Mullen MD   chlorhexidine (PERIDEX) 0.12 % solution Apply 15 mL to the mouth or throat 2 (Two) Times a Day. 15 ml using oral  swab twice daily for oral care    Odalys Mullen MD   Citric Rt-Jqdgnniiyja-Bd Carb (Renacidin) solution Irrigate with 60 mL to the affected area as directed by provider 2 (Two) Times a Day. Irrigate stratton catheter    Odalys Mullen MD   fluticasone (FLONASE) 50 MCG/ACT nasal spray 2 sprays into the nostril(s) as directed by provider Every Morning.    Odalys Mullen MD   guaifenesin (ROBITUSSIN) 100 MG/5ML liquid Administer 30 mL per G tube 2 (Two) Times a Day.    Odalys Mullen MD   hydrOXYzine pamoate (VISTARIL) 25 MG capsule Administer 1 capsule per G tube Every 6 (Six) Hours As Needed for Itching.    Odalys Mullen MD   ipratropium-albuterol (DUO-NEB) 0.5-2.5 mg/3 ml nebulizer Take 3 mL by nebulization Every 4 (Four) Hours As Needed for Wheezing.    Odalys Mullen MD   liver oil-zinc oxide (DESITIN) 40 % ointment Apply 1 application  topically to the appropriate area as directed Every 4 (Four) Hours As Needed for Irritation.  Patient taking differently: Apply 1 Application topically to the appropriate area as directed Every 4 (Four) Hours As Needed for Irritation. To perirectal and groin area after any stool incontinence 12/4/23   Amado Villarreal MD   miconazole (MICOTIN) 2 % powder Apply 1 Application topically to the appropriate area as directed 2 (Two) Times a Day As Needed for Itching. Apply to groin or gluteal folds for rash, itching, redness and/or irritation    Odalys Mullen MD   multivitamin with minerals tablet tablet Take 1 tablet by mouth Every Morning.    Odalys Mullen MD   nystatin (MYCOSTATIN) 591689 UNIT/GM powder Apply 1 Application topically to the appropriate area as directed 2 (Two) Times a Day. To bilateral bendarms, left abdominal fold, and under left breast    Odalys Mullen MD   ondansetron (ZOFRAN) 4 MG/5ML solution Take 5 mL by mouth Every 6 (Six) Hours As Needed for Nausea or Vomiting.    Odalys Mullen MD    pantoprazole (PROTONIX) 2 mg/mL suspension suspension Administer 20 mL per G tube 2 (Two) Times a Day.    Odalys Mullen MD   polyethylene glycol (MiraLax) 17 g packet Take 17 g by mouth Daily.    Odalys Mullen MD   rosuvastatin (CRESTOR) 5 MG tablet Administer 1 tablet per G tube Every Night.    Odalys Mullen MD   Scopolamine 1 MG/3DAYS patch Place 1 patch on the skin as directed by provider Every 72 (Seventy-Two) Hours.    Odalys Mullen MD   sennosides-docusate (Senna Plus) 8.6-50 MG per tablet Take 2 tablets by mouth 2 (Two) Times a Day.    Odalys Mullen MD   simethicone (MYLICON) 40 MG/0.6ML drops Administer 0.3 mL per G tube Every 6 (Six) Hours.    Odalsy Mullen MD   sodium chloride (NS) 0.9 % irrigation Irrigate with 10 mL to the affected area as directed by provider Every 8 (Eight) Hours. Irrigation for secretions: Hydration    Odalys Mullen MD   Sodium Phosphates (fleet enema) 7-19 GM/118ML enema Insert 1 enema into the rectum Every Other Day As Needed (bowel management).    Odalys Mullen MD     I have utilized all available immediate resources to obtain, update, or review the patient's current medications (including all prescriptions, over-the-counter products, herbals, cannabis/cannabidiol products, and vitamin/mineral/dietary (nutritional) supplements).    Objective     Vital Signs: /61   Pulse 108   Temp (!) 101.7 °F (38.7 °C) (Rectal)   Resp 24   Wt 59 kg (130 lb 1.1 oz)   LMP  (LMP Unknown)   SpO2 94%   BMI 23.04 kg/m²   Physical Exam  Vitals reviewed.   Constitutional:       General: She is not in acute distress.     Appearance: She is well-developed. She is ill-appearing, toxic-appearing and diaphoretic.   HENT:      Head: Normocephalic and atraumatic.      Right Ear: External ear normal.      Left Ear: External ear normal.      Mouth/Throat:      Mouth: Mucous membranes are dry.      Pharynx: Oropharynx is clear.   Eyes:       General:         Right eye: No discharge.         Left eye: No discharge.      Extraocular Movements: Extraocular movements intact.      Conjunctiva/sclera: Conjunctivae normal.      Pupils: Pupils are equal, round, and reactive to light.   Neck:      Vascular: No JVD.   Cardiovascular:      Rate and Rhythm: Regular rhythm. Tachycardia present.      Pulses: Normal pulses.      Heart sounds: Normal heart sounds. No murmur heard.     No friction rub. No gallop.   Pulmonary:      Effort: Pulmonary effort is normal. No respiratory distress.      Breath sounds: No stridor. Rales present. No wheezing or rhonchi.   Chest:      Chest wall: No tenderness.   Abdominal:      General: Bowel sounds are normal. There is no distension.      Palpations: Abdomen is soft.      Tenderness: There is no abdominal tenderness. There is no guarding or rebound.      Hernia: No hernia is present.   Musculoskeletal:         General: No swelling, tenderness or deformity. Normal range of motion.      Cervical back: Normal range of motion and neck supple. No rigidity or tenderness. No muscular tenderness.      Right lower leg: No edema.      Left lower leg: No edema.   Skin:     General: Skin is warm and dry.      Findings: No erythema or rash.   Neurological:      Mental Status: She is alert.      Cranial Nerves: No cranial nerve deficit.      Motor: Weakness present. No abnormal muscle tone.      Deep Tendon Reflexes: Reflexes normal.      Comments: Bedbound nonverbal female with contracture in 4 extremities.  She opens eyes.  Does not follow commands.     Results Reviewed:  Lab Results (last 24 hours)       Procedure Component Value Units Date/Time    Lactic Acid, Plasma [[id number]]  (Normal) Collected: 07/14/24 2303    Specimen: Blood from Hand, Left Updated: 07/14/24 2323     Lactate 1.1 mmol/L     COVID PRE-OP / PRE-PROCEDURE SCREENING ORDER (NO ISOLATION) - Swab, Nasal Cavity [[id number]]  (Normal) Collected: 07/14/24 2213     Specimen: Swab from Nasal Cavity Updated: 07/14/24 2320    Narrative:      The following orders were created for panel order COVID PRE-OP / PRE-PROCEDURE SCREENING ORDER (NO ISOLATION) - Swab, Nasal Cavity.  Procedure                               Abnormality         Status                     ---------                               -----------         ------                     COVID-19 and FLU A/B PCR...[[id number]]  Normal              Final result                 Please view results for these tests on the individual orders.    COVID-19 and FLU A/B PCR, 1 HR TAT - Swab, Nasopharynx [[id number]]  (Normal) Collected: 07/14/24 2213    Specimen: Swab from Nasopharynx Updated: 07/14/24 2320     COVID19 Not Detected     Influenza A PCR Not Detected     Influenza B PCR Not Detected    Narrative:      Fact sheet for providers: https://www.fda.gov/media/449874/download    Fact sheet for patients: https://www.fda.gov/media/903264/download    Test performed by PCR.    Comprehensive Metabolic Panel [[id number]]  (Abnormal) Collected: 07/14/24 2231    Specimen: Blood Updated: 07/14/24 2310     Glucose 141 mg/dL      BUN 29 mg/dL      Creatinine 0.46 mg/dL      Sodium 141 mmol/L      Potassium 4.5 mmol/L      Comment: Slight hemolysis detected by analyzer. Result may be falsely elevated.        Chloride 104 mmol/L      CO2 26.0 mmol/L      Calcium 10.1 mg/dL      Total Protein 7.7 g/dL      Albumin 3.7 g/dL      ALT (SGPT) 14 U/L      AST (SGOT) 14 U/L      Comment: Slight hemolysis detected by analyzer. Result may be falsely elevated.        Alkaline Phosphatase 84 U/L      Total Bilirubin 0.7 mg/dL      Globulin 4.0 gm/dL      A/G Ratio 0.9 g/dL      BUN/Creatinine Ratio 63.0     Anion Gap 11.0 mmol/L      eGFR 118.9 mL/min/1.73     Narrative:      GFR Normal >60  Chronic Kidney Disease <60  Kidney Failure <15      Blood Culture - Blood, Hand, Left [[id number]] Collected: 07/14/24 2303    Specimen: Blood from Hand, Left Updated:  07/14/24 2308    Blood Culture - Blood, Hand, Right [[id number]] Collected: 07/14/24 2302    Specimen: Blood from Hand, Right Updated: 07/14/24 2306    Urinalysis With Culture If Indicated - Indwelling Urethral Catheter [[id number]]  (Abnormal) Collected: 07/14/24 2215    Specimen: Urine from Indwelling Urethral Catheter Updated: 07/14/24 2244     Color, UA Dark Yellow     Appearance, UA Turbid     pH, UA >=9.0     Specific Gravity, UA 1.017     Glucose, UA Negative     Ketones, UA Negative     Bilirubin, UA Negative     Blood, UA Moderate (2+)     Protein, UA >=300 mg/dL (3+)     Leuk Esterase, UA Large (3+)     Nitrite, UA Positive     Urobilinogen, UA 1.0 E.U./dL    Narrative:      In absence of clinical symptoms, the presence of pyuria, bacteria, and/or nitrites on the urinalysis result does not correlate with infection.    Urinalysis, Microscopic Only - Indwelling Urethral Catheter [[id number]]  (Abnormal) Collected: 07/14/24 2215    Specimen: Urine from Indwelling Urethral Catheter Updated: 07/14/24 2244     RBC, UA 6-10 /HPF      WBC, UA 11-20 /HPF      Bacteria, UA 4+ /HPF      Squamous Epithelial Cells, UA None Seen /HPF      Hyaline Casts, UA 7-12 /LPF      Coarse Granular Casts, UA 3-6 /LPF      Triple Phosphate Crystals, UA Moderate/2+ /HPF      Methodology Manual Light Microscopy    Urine Culture - Urine, Indwelling Urethral Catheter [[id number]] Collected: 07/14/24 2215    Specimen: Urine from Indwelling Urethral Catheter Updated: 07/14/24 2244    CBC & Differential [[id number]]  (Abnormal) Collected: 07/14/24 2231    Specimen: Blood Updated: 07/14/24 2242    Narrative:      The following orders were created for panel order CBC & Differential.  Procedure                               Abnormality         Status                     ---------                               -----------         ------                     CBC Auto Differential[[id number]]        Abnormal            Final result                  Please view results for these tests on the individual orders.    CBC Auto Differential [[id number]]  (Abnormal) Collected: 07/14/24 2231    Specimen: Blood Updated: 07/14/24 2242     WBC 18.80 10*3/mm3      RBC 3.98 10*6/mm3      Hemoglobin 11.4 g/dL      Hematocrit 36.5 %      MCV 91.7 fL      MCH 28.6 pg      MCHC 31.2 g/dL      RDW 13.2 %      RDW-SD 44.5 fl      MPV 10.9 fL      Platelets 274 10*3/mm3      Neutrophil % 88.3 %      Lymphocyte % 5.5 %      Monocyte % 4.8 %      Eosinophil % 0.4 %      Basophil % 0.4 %      Immature Grans % 0.6 %      Neutrophils, Absolute 16.59 10*3/mm3      Lymphocytes, Absolute 1.04 10*3/mm3      Monocytes, Absolute 0.91 10*3/mm3      Eosinophils, Absolute 0.08 10*3/mm3      Basophils, Absolute 0.07 10*3/mm3      Immature Grans, Absolute 0.11 10*3/mm3      nRBC 0.0 /100 WBC           Imaging Results (Last 24 Hours)       Procedure Component Value Units Date/Time    CT Abdomen Pelvis With Contrast [[id number]] Resulted: 07/15/24 0014     Updated: 07/15/24 0032    XR Chest 1 View [[id number]] Resulted: 07/14/24 2222     Updated: 07/14/24 2223          I have personally reviewed and interpreted the radiology studies and ECG obtained at time of admission.     Assessment / Plan   Assessment:   Active Hospital Problems    Diagnosis     **Sepsis secondary to UTI     History of anoxic brain injury     Functional quadriplegia     Tracheostomy present     Ileus     Essential (primary) hypertension      Treatment Plan  The patient will be admitted to my service here at [facility].   Admit to med floor with remote telemetry  Vitals every 4 hours  Diet n.p.o.  IV fluids sepsis bolus given in the emergency room continue with normal saline at 100 cc an hour.  Bolus normal saline.  MAP less than 60  Rocephin 2 g IV piggyback every 24 hours  Follow blood and urine cultures.  Prior cultures noted with different organisms including Pseudomonas in April urine culture, E. coli and  Proteus in May urine culture which were resistant.  Unclear if these were contaminants these were treated outpatient.  Ins and outs Daily weights  Bedrest    Chronic respiratory failure with tracheostomy   trach collar in place  Oxygen per trach collar    DVT prophylaxis Lovenox 40 mg subcutaneous daily    Medical Decision Making  Number and Complexity of problems: 4 complex medical problems  Differential Diagnosis: see above    Conditions and Status        Condition is unchanged.     [facility] Data  External documents reviewed: [employer] EHR  Cardiac tracing (EKG, telemetry) interpretation: Sinus rhythm   Radiology interpretation: See above  Labs reviewed: see above  Any tests that were considered but not ordered: none     Decision rules/scores evaluated (example [id number], [place], etc): N/A     Discussed with: ER attending     Care Planning  Shared decision making: Noel Johnson   Code status and discussions: DNR per records    Disposition  Social Determinants of Health that impact treatment or disposition: none  Estimated length of stay is over 2 midnights.     I confirmed that the patient's advanced care plan is present, code status is documented, and a surrogate decision maker is listed in the patient's medical record.     The patient's surrogate decision maker is Noel Johnson, father.     The patient was seen and examined by me on 7/15/2024 at 0245 AM.    Electronically signed by Dvaid Hernández MD, 07/15/24, 06:19 CDT.

## 2024-07-15 NOTE — CONSULTS
Referring Provider: Edgar  Reason for Consultation: Sepsis, UTI    Chief complaint: Sepsis, UTI    Subjective     History of present illness:    Ms. Zabala is a 49 year old female with a complex medical history.    Consults by Junior Dyer MD (02/08/2024 08:47)     Consults by Tobi Bailon MD (11/27/2023 12:46)     She is currently followed by [facility]. See notes from Dr. Rina Rey (4/25/24, 6/27/24). Per her note dated 6/27/24:    It was a pleasure seeing Namita Zabala in clinic today. She is non-verbal and on stretcher and appears stable. CT urogram reviewed and appears to show no hydro bilaterally without any left ureter extravasation, thus no immediate indication for left PCNU replacement. Normal renal function on recent OSH BMP, will recheck today. However she has large b/l renal stones which will eventually cause an issue. She is not a surgical candidate due to her numerous medical comorbidities and fragility. Options include observation with PCN placement if/when stones become obstructive versus elective right PCN placement since that's the side w/ largest stones.  not present in clinic today - called but unable to be reached, will try again to discuss further. She has had chronic PCNs for years up until now and had many issues with them, but risks without PCNs are sepsis and possible mortality given that she's non-verbal and frail. Will discuss her goals of care w/ .    Per nursing report, the patient's  has passed away last week.     Review of Systems  Pertinent items are noted in HPI, all other systems reviewed and negative     History  Past Medical History:   Diagnosis Date    Acute kidney failure, unspecified     Angina at rest     Anoxic brain damage, not elsewhere classified     Chronic pulmonary embolism     Chronic respiratory failure, unspecified whether with hypoxia or hypercapnia     Dependence on respirator (ventilator) status      Gastrointestinal hemorrhage, unspecified     Hypercalcemia     Iron deficiency anemia     Metabolic encephalopathy     Migraine     Sees Pain Management for injections.     MVP (mitral valve prolapse)     Other dysphagia     Other pulmonary embolism with acute cor pulmonale     Renal disorder     Ruptured bladder     Syncope     Urinary tract infection, site not specified        Past Surgical History:   Procedure Laterality Date    ABDOMINAL SURGERY      BREAST BIOPSY Right 2011    benign    GTUBE INSERTION      INSERTION HEMODIALYSIS CATHETER Left 09/16/2021    Procedure: HEMODIALYSIS CATHETER INSERTION;  Surgeon: Anders Kaur MD;  Location: [place] [address];  Service: Vascular;  Laterality: Left;    TONSILLECTOMY      TRACHEOSTOMY         Family History   Problem Relation Age of Onset    Colon cancer Maternal Aunt 52    Fibromyalgia Mother     Heart disease Mother     Hypertension Mother     Hodgkin's lymphoma Paternal Grandmother     Ovarian cancer Neg Hx     Breast cancer Neg Hx        Social History     Socioeconomic History    Marital status:    Tobacco Use    Smoking status: Never    Smokeless tobacco: Never   Vaping Use    Vaping status: Never Used   Substance and Sexual Activity    Alcohol use: No    Drug use: No    Sexual activity: Defer     Birth control/protection: OCP       Current Facility-Administered Medications   Medication Dose Route Frequency Provider Last Rate Last Admin    acetaminophen (TYLENOL) tablet 650 mg  650 mg Per G Tube Q4H PRN David Hernández MD        Or    acetaminophen (TYLENOL) 160 MG/5ML oral solution 650 mg  650 mg Per G Tube Q4H PRN David Hernández MD        Or    acetaminophen (TYLENOL) suppository 650 mg  650 mg Rectal Q4H PRN David Hernández MD   650 mg at 07/15/24 0522    baclofen (LIORESAL) tablet 10 mg  10 mg Per G Tube TID David Hernández MD   10 mg at 07/15/24 1116    bisacodyl (DULCOLAX) suppository 10 mg  10 mg  Rectal Q48H PRN David Hernández MD        bisacodyl (DULCOLAX) suppository 10 mg  10 mg Rectal Daily Montserrat Feliciano APRN   10 mg at 07/15/24 1116    cefTRIAXone (ROCEPHIN) 2,000 mg in sodium chloride 0.9 % 100 mL MBP  2,000 mg Intravenous Q24H David Hernández MD        cetirizine (zyrTEC) tablet 10 mg  10 mg Per G Tube Daily David Hernández MD   10 mg at 07/15/24 1115    Enoxaparin Sodium (LOVENOX) syringe 40 mg  40 mg Subcutaneous Daily David Hernández MD   40 mg at 07/15/24 1127    fleet enema 1 enema  1 enema Rectal Q48H PRN David Hernández MD        guaifenesin (ROBITUSSIN) 100 MG/5ML liquid 600 mg  600 mg Per G Tube BID David Hernández MD   600 mg at 07/15/24 1118    ipratropium-albuterol (DUO-NEB) nebulizer solution 3 mL  3 mL Nebulization Q4H PRN David Hernández MD        ondansetron (ZOFRAN) injection 4 mg  4 mg Intravenous Q6H PRN David Hernández MD        polyethylene glycol (MIRALAX) packet 17 g  17 g Per G Tube Daily David Hernández MD   17 g at 07/15/24 1118    rosuvastatin (CRESTOR) tablet 5 mg  5 mg Per G Tube Nightly David Hernández MD        sodium chloride 0.9 % flush 10 mL  10 mL Intravenous Q12H David Hernández MD   10 mL at 07/15/24 1122    sodium chloride 0.9 % flush 10 mL  10 mL Intravenous PRN David Hernández MD        sodium chloride 0.9 % infusion 40 mL  40 mL Intravenous PRN David Hernández MD        sodium chloride 0.9 % infusion  100 mL/hr Intravenous Continuous David Hernández  mL/hr at 07/15/24 0345 100 mL/hr at 07/15/24 0345        Allergies   Allergen Reactions    Coconut Unknown - High Severity    Levaquin [Levofloxacin] Other (See Comments)     unknown    Nuts Unknown - High Severity    Penicillins Rash     Patient's father noted patient had taken penicillin, many years ago, many times and then started developing a rash when she would take it.  She  has received cefepime, ceftriaxone and cephalexin with no known adverse problems    [place] Other (See Comments)     Causes migraines per pt reports       Objective     Vital Signs   Temp:  [99.7 °F (37.6 °C)-101.7 °F (38.7 °C)] 100 °F (37.8 °C)  Heart Rate:  [105-121] 121  Resp:  [18-24] 18  BP: ()/(50-68) 122/60    Intake/Output Summary (Last 24 hours) at 7/15/2024 1127  Last data filed at 7/15/2024 0900  Gross per 24 hour   Intake 1100 ml   Output --   Net 1100 ml       Physical Exam:    General: Nonverbal, ill-appearing  Head:  Normocephalic, without obvious abnormality, atraumatic  Eyes:   Lids and lashes normal, no icterus, no pallor  Ears:  Ears appear intact with no abnormalities noted  Neck:  Supple  Back:  No costovertebral angle tenderness  Lungs: Unlabored respirations  Heart:  Regular rhythm and normal rate  Abdomen: Soft, nontender, nondistended  :  Mojica in place  Extremities: Contracted  Skin:  Warm and dry  Neurologic: Cranial nerves 2 - 12 grossly intact    Data Review:    Mojica in place.    Assessment and Plan:    Kidney stones, neurogenic bladder, UTI: Treat UTI with a minimum of 14 days of culture specific antibiotics. Change Mojica. Patient will need to follow up with [facility] given the complexity of her medical conditions. However, I don't think there is much they can do either other than conservative care. Would recommend palliative care consult.     UROLOGICAL DISPOSITION: Urology to sign off.    I discussed the patient's findings and my recommendations with patient and nursing staff    (Please note that portions of this note were completed with a voice recognition program.)    Junior Dyer MD  07/15/24  11:27 CDT    Time: Time spent: 30 minutes spent performing evaluation and management, chart review, and discussion with patient, > 50% of time spent in face-to-face encounter

## 2024-07-15 NOTE — PLAN OF CARE
Goal Outcome Evaluation:              Outcome Evaluation: OT orders recieved. Per CR, pt is nonverbal at baseline & dependent for all care. Spoke w kathrine who is familiar with pt and she reports pt is at her baseline therefore no therapy needs at this time. OT/PT to sign off.

## 2024-07-15 NOTE — ED PROVIDER NOTES
Subjective   History of Present Illness  Pt presents to the  from [facility] with report that her PEG tube was clogged and that her trach had come out and could not be replaced.  On arrival - pt's tracheostomy is in good position.  She was noted to have fever with EMS and has had some hypoxia.  Pt is unable to provide any hx        Review of Systems   Reason unable to perform ROS: Pt non-verbal.       Past Medical History:   Diagnosis Date    Acute kidney failure, unspecified     Angina at rest     Anoxic brain damage, not elsewhere classified     Chronic pulmonary embolism     Chronic respiratory failure, unspecified whether with hypoxia or hypercapnia     Dependence on respirator (ventilator) status     Gastrointestinal hemorrhage, unspecified     Hypercalcemia     Iron deficiency anemia     Metabolic encephalopathy     Migraine     Sees Pain Management for injections.     MVP (mitral valve prolapse)     Other dysphagia     Other pulmonary embolism with acute cor pulmonale     Renal disorder     Ruptured bladder     Syncope     Urinary tract infection, site not specified        Allergies   Allergen Reactions    Coconut Unknown - High Severity    Levaquin [Levofloxacin] Other (See Comments)     unknown    Nuts Unknown - High Severity    Penicillins Rash     Patient's father noted patient had taken penicillin, many years ago, many times and then started developing a rash when she would take it.  She has received cefepime, ceftriaxone and cephalexin with no known adverse problems    [place] Other (See Comments)     Causes migraines per pt reports       Past Surgical History:   Procedure Laterality Date    ABDOMINAL SURGERY      BREAST BIOPSY Right 2011    benign    GTUBE INSERTION      INSERTION HEMODIALYSIS CATHETER Left 09/16/2021    Procedure: HEMODIALYSIS CATHETER INSERTION;  Surgeon: Anders Kaur MD;  Location: [place] [address];  Service: Vascular;  Laterality: Left;    TONSILLECTOMY       TRACHEOSTOMY         Family History   Problem Relation Age of Onset    Colon cancer Maternal Aunt 52    Fibromyalgia Mother     Heart disease Mother     Hypertension Mother     Hodgkin's lymphoma Paternal Grandmother     Ovarian cancer Neg Hx     Breast cancer Neg Hx        Social History     Socioeconomic History    Marital status:    Tobacco Use    Smoking status: Never    Smokeless tobacco: Never   Vaping Use    Vaping status: Never Used   Substance and Sexual Activity    Alcohol use: No    Drug use: No    Sexual activity: Defer     Birth control/protection: OCP           Objective   Physical Exam  Vitals and nursing note reviewed.   Constitutional:       General: She is not in acute distress.  HENT:      Head: Normocephalic.      Mouth/Throat:      Mouth: Mucous membranes are moist.   Neck:      Comments: Tracheostomy in place.  No erythema/bleeding.    Cardiovascular:      Rate and Rhythm: Normal rate and regular rhythm.      Pulses: Normal pulses.      Heart sounds: Normal heart sounds.   Pulmonary:      Effort: Pulmonary effort is normal.      Breath sounds: Normal breath sounds.   Abdominal:      General: Abdomen is flat.      Palpations: Abdomen is soft.   Genitourinary:     Comments: Mojica in place - small amount of dark urine in bag  Skin:     General: Skin is warm and dry.      Capillary Refill: Capillary refill takes less than 2 seconds.      Comments: Pt with some skin breakdown/erythema around trach collar with crusting noted to collar   Neurological:      Mental Status: She is alert.         Procedures           ED Course      Labs Reviewed   COMPREHENSIVE METABOLIC PANEL - Abnormal; Notable for the following components:       Result Value    Glucose 141 (*)     BUN 29 (*)     Creatinine 0.46 (*)     BUN/Creatinine Ratio 63.0 (*)     All other components within normal limits    Narrative:     GFR Normal >60  Chronic Kidney Disease <60  Kidney Failure <15     URINALYSIS W/ CULTURE IF INDICATED  - Abnormal; Notable for the following components:    Color, UA Dark Yellow (*)     Appearance, UA Turbid (*)     pH, UA >=9.0 (*)     Blood, UA Moderate (2+) (*)     Protein, UA >=300 mg/dL (3+) (*)     Leuk Esterase, UA Large (3+) (*)     Nitrite, UA Positive (*)     All other components within normal limits    Narrative:     In absence of clinical symptoms, the presence of pyuria, bacteria, and/or nitrites on the urinalysis result does not correlate with infection.   CBC WITH AUTO DIFFERENTIAL - Abnormal; Notable for the following components:    WBC 18.80 (*)     Hemoglobin 11.4 (*)     MCHC 31.2 (*)     Neutrophil % 88.3 (*)     Lymphocyte % 5.5 (*)     Monocyte % 4.8 (*)     Immature Grans % 0.6 (*)     Neutrophils, Absolute 16.59 (*)     Monocytes, Absolute 0.91 (*)     Immature Grans, Absolute 0.11 (*)     All other components within normal limits   URINALYSIS, MICROSCOPIC ONLY - Abnormal; Notable for the following components:    RBC, UA 6-10 (*)     WBC, UA 11-20 (*)     Bacteria, UA 4+ (*)     All other components within normal limits   COVID-19 AND FLU A/B, NP SWAB IN TRANSPORT MEDIA 1 HR TAT - Normal    Narrative:     Fact sheet for providers: https://www.fda.gov/media/493298/download    Fact sheet for patients: https://www.fda.gov/media/008456/download    Test performed by PCR.   LACTIC ACID, PLASMA - Normal   COVID PRE-OP / PRE-PROCEDURE SCREENING ORDER (NO ISOLATION)    Narrative:     The following orders were created for panel order COVID PRE-OP / PRE-PROCEDURE SCREENING ORDER (NO ISOLATION) - Swab, Nasal Cavity.  Procedure                               Abnormality         Status                     ---------                               -----------         ------                     COVID-19 and FLU A/B PCR...[[id number]]  Normal              Final result                 Please view results for these tests on the individual orders.   URINE CULTURE   BLOOD CULTURE   BLOOD CULTURE   CBC AND DIFFERENTIAL     Narrative:     The following orders were created for panel order CBC & Differential.  Procedure                               Abnormality         Status                     ---------                               -----------         ------                     CBC Auto Differential[[id number]]        Abnormal            Final result                 Please view results for these tests on the individual orders.     XR Chest 1 View    (Results Pending)   CT Abdomen Pelvis With Contrast    (Results Pending)                                              Medical Decision Making  Pt presented to the [facility] with some hypoxia which improved with trach care - her trach was not displaced.  Her trach collar was crusted and causing skin irritation around neck - this was cleaned.  She is noted to have UTI with sepsis.  Was given rocephin in EC and IVFs.  Have d/w Dr. Hernández for admit/further mgmt.    Amount and/or Complexity of Data Reviewed  Labs: ordered.  Radiology: ordered.    Risk  OTC drugs.  Prescription drug management.        Final diagnoses:   Acute UTI (urinary tract infection)   Sepsis, due to unspecified organism, unspecified whether acute organ dysfunction present       ED Disposition  ED Disposition       ED Disposition   Decision to Admit    Condition   --    Comment   --               No follow-up provider specified.       Medication List      No changes were made to your prescriptions during this visit.            Jean-Claude Golden, DO  07/14/24 8972       Jean-Claude Golden, DO  07/14/24 2964       Jean-Claude Golden, DO  07/15/24 0143

## 2024-07-15 NOTE — PROGRESS NOTES
Patient Name: Namita Zabala  Date of Admission: 7/14/2024  Today's Date: 07/15/24  Length of Stay: 0  Primary Care Physician: David Lara MD    Subjective   Chief Complaint: Clogged PEG tube and displaced tracheostomy.  HPI   Namita Zabala is a 49-year-old female with a history of anoxic brain damage, tracheostomy, PEG tube, chronic respiratory failure, pulmonary embolism, recurrent UTI, and staghorn renal calculus.  Patient was transferred per EMS from [facility] with complaints of a clogged PEG tube and displaced tracheostomy.  Upon assessment at [facility] emergency department the patient presented in a septic state with fever of 101.7, tachycardia at 121, WBC of 18.8, with obvious source of infection, as urinalysis revealed turbid urine with hematuria and nitrates, leukocytes, and 4+ bacteria.  Chronic Mojica catheter managed per outpatient urology at [facility], most recent appointment was 6/27/2024 where she was evaluated post nephrostomy tube being removed, where their recommendations were to observe rather than replace tube at that time.  ED workup revealed tracheostomy was not dislodged however it was extremely clogged with debris and dried secretions, when that was cleaned and replaced patient was able to utilize properly.  Additionally PEG tube was not dislodged, after cleaning debris and evaluating PEG tube it was in proper alignment.  CT scan revealed thickening in handedness on the mucosal urothelium of the collecting system bilaterally may represent acute or subacute inflammatory process/pyeloureteritis, no evidence of acute.  Stable nonobstructive bilateral staghorn calculi, and significant large volume stool in the distal colon with moderate gas distention of proximal colon.  Mildly dilated fluid filled small bowel loops may represent ileus or acute nonspecific enteritis.  No evidence of small bowel obstruction.  ED medications 1 L normal saline bolus,  650 mg Tylenol suppository, and 1 g of Rocephin.  Previous urine culture 5/24 was positive for ESBL and Proteus Mirabilis.  Susceptible to ceftriaxone, 2 g continued.    Today: Patient appears to be resting comfortably in bed, on trach collar at 9 L.  She is able to follow me with her eyes, however nonverbal chronically.  Per nursing patient's tracheostomy and PEG tube were covered in old and dried secretions, once all was cleaned no obvious signs or symptoms of infection on the PEG tube and tracheostomy was easily placed back without respiratory distress.  Was evaluated by dietitian who recommended resumption of tube feed, will resume at low-dose continuously due to possible ileus.  Dr. Dyer evaluated patient today with recommendations for continued treatment of UTI for minimum of 14 days with culture specific antibiotics.  He recommended Mojica catheter be changed and patient will need to follow-up with [place] due to her complex if medical conditions.  Today mild improvement in WBC to 17.84, however bandemia now present, ANC of 16.22, blood and urine cultures remain pending.  Palliative consultation per Nneka Barton today.  Patient's parents were open to to the discussion of palliative care versus hospice, however they were on their way to the patient's 's visitation and would like to discuss further tomorrow.  Patient has continued to have elevated heart rate and increased grimacing.  Patient has taken no pain medicines at home.  Initiated Toradol and will evaluate for symptomatic relief.  Will reserve starting full palliative care pain management until discussion 7/16 with family.      Review of Systems   Constitutional:  Positive for diaphoresis, fatigue and fever.   HENT:  Positive for congestion, drooling, rhinorrhea and trouble swallowing.    Eyes:  Positive for discharge. Negative for redness.   Respiratory:  Positive for cough and wheezing.    Cardiovascular:         Sinus tachycardia    Gastrointestinal:  Positive for constipation. Negative for abdominal distention, diarrhea and vomiting.   Endocrine: Negative for heat intolerance.   Genitourinary:         Chronic Mojica catheter, recent removal of nephrostomy tube   Musculoskeletal:         Patient is bedbound with chronic contractures of all extremities.   Skin:  Positive for pallor and wound.        Healing coccyx wound   Neurological:  Positive for facial asymmetry, speech difficulty and weakness.   Hematological:  Does not bruise/bleed easily.   Psychiatric/Behavioral:  Positive for confusion.       All pertinent negatives and positives are as above. All other systems have been reviewed and are negative unless otherwise stated.     Objective    Temp:  [98.8 °F (37.1 °C)-101.7 °F (38.7 °C)] 98.8 °F (37.1 °C)  Heart Rate:  [105-121] 111  Resp:  [16-24] 16  BP: ()/(50-74) 127/74  Physical Exam  Constitutional:       Appearance: She is cachectic. She is ill-appearing and diaphoretic.   HENT:      Head: Normocephalic.      Nose: Congestion and rhinorrhea present.      Mouth/Throat:      Mouth: Mucous membranes are moist.      Pharynx: Oropharynx is clear.   Eyes:      Pupils: Pupils are equal, round, and reactive to light.   Neck:      Comments: Chronic contractures of neck  Cardiovascular:      Rate and Rhythm: Regular rhythm. Tachycardia present.      Pulses: Normal pulses.   Pulmonary:      Breath sounds: Wheezing and rhonchi present.      Comments: Trach collar at 9 L  Abdominal:      General: Bowel sounds are normal. There is no distension.      Palpations: Abdomen is soft.      Tenderness: There is no abdominal tenderness.   Genitourinary:     Comments: Voiding per chronic Mojica catheter  Musculoskeletal:      Cervical back: Rigidity present.      Right lower leg: No edema.      Left lower leg: No edema.      Comments: Bedbound with chronic fractures of all extremities   Skin:     Capillary Refill: Capillary refill takes less than 2  seconds.   Neurological:      Mental Status: She is alert. She is disoriented.      Motor: Weakness, atrophy and abnormal muscle tone present.   Psychiatric:         Attention and Perception: She is inattentive.         Mood and Affect: Affect is flat.         Behavior: Behavior is slowed and withdrawn.         Cognition and Memory: Cognition is impaired.      Comments: Nonverbal at baseline       Results Review:  I have reviewed the labs, radiology results, and diagnostic studies.    Laboratory Data:   Results from last 7 days   Lab Units 07/15/24  0600 07/14/24  2231   WBC 10*3/mm3 17.84* 18.80*   HEMOGLOBIN g/dL 10.3* 11.4*   HEMATOCRIT % 32.7* 36.5   PLATELETS 10*3/mm3 207 274        Results from last 7 days   Lab Units 07/15/24  0600 07/14/24  2231   SODIUM mmol/L 141 141   POTASSIUM mmol/L 3.5 4.5   CHLORIDE mmol/L 110* 104   CO2 mmol/L 23.0 26.0   BUN mg/dL 23* 29*   CREATININE mg/dL 0.40* 0.46*   CALCIUM mg/dL 8.8 10.1   BILIRUBIN mg/dL  --  0.7   ALK PHOS U/L  --  84   ALT (SGPT) U/L  --  14   AST (SGOT) U/L  --  14   GLUCOSE mg/dL 113* 141*       Culture Data:   Urine Culture   Date Value Ref Range Status   07/14/2024 Culture in progress  Preliminary       Radiology Data:   Imaging Results (Last 24 Hours)       Procedure Component Value Units Date/Time    XR Abdomen KUB [[id number]] Collected: 07/15/24 1057     Updated: 07/15/24 1102    Narrative:      XR ABDOMEN KUB-     HISTORY: Peg Tube placement; N39.0-Urinary tract infection, site not  specified; A41.9-Sepsis, unspecified organism     COMPARISON: 6/4/2024     FINDINGS: Frontal view of the abdomen is obtained.     Gastrostomy tube projects over the region of the stomach. No contrast  injected with the study. Gaseous distended small and large bowel loops  favoring adynamic ileus. No pneumatosis.       Impression:      1. Gastrostomy tube projects over the region of the stomach, no contrast  injected with the study.  2. Gaseous distended small and large  bowel favoring adynamic ileus.     This report was signed and finalized on 7/15/2024 10:59 AM by Dr. Rian Ricardo MD.       CT Abdomen Pelvis With Contrast [[id number]] Collected: 07/15/24 0546     Updated: 07/15/24 0753    Narrative:      EXAMINATION: CT ABDOMEN PELVIS W CONTRAST-      7/14/2024 11:13 PM     HISTORY: fever; N39.0-Urinary tract infection, site not specified;  A41.9-Sepsis, unspecified organism     In order to have a CT radiation dose as low as reasonably achievable  Automated Exposure Control was utilized for adjustment of the mA and/or  KV according to patient size.     Total DLP = 481.19 mGy.cm     The CT scan of the abdomen and pelvis is performed after intravenous  contrast enhancement.     Images are acquired in axial plane and subsequent reconstruction in  coronal and sagittal planes.     The comparison is made with the previous study dated 5/3/2024.     The limited included lung bases show coarse/nodular interstitial  infiltrate in the lower lungs.     The liver and spleen are normal.     No radiopaque gallstones.     Pancreas is normal.     The adrenal glands bilaterally are normal.     Moderate lobulation of the kidneys bilaterally. There are bilateral  large/staghorn calculi. Large part of the right staghorn calculus  extends into the right renal pelvis. The previously seen left ureteral  stent and nephrostomy tube have been removed in the interval. There is  significant thickening and enhancement of the urothelium of the renal  pelvis and ureters bilaterally. No significant dilatation of the  ureters. No calculi in either ureter. The urinary bladder is  decompressed with a Mojica catheter and not optimally visualized or  evaluated.     There is a gastrostomy tube in place. The stomach is moderately  distended with fluid and gas. A mildly dilated fluid-filled small bowel  is seen. No finding to suggest obstruction. Fluid in gas-filled large  bowel is seen with a significant large volume  of stool in the distal  colon, particularly the rectum. This is similar to the previous study.     Normal abdominal aorta and iliac arteries.     Images reviewed in bone window show moderate levoscoliosis of the lumbar  spine. No acute bony abnormality.       Impression:      1. Moderate thickening and enhancement of the mucosa/urothelium of the  collecting system bilaterally may represent acute or subacute  inflammatory process/pyeloureteritis. No evidence of acute  pyelonephritis.  2. Bilateral staghorn calculi. No finding to suggest obstruction.  3. A significant large volume stool in the distal colon with moderate  gas distended proximal colon. The possibility of fecal impaction not  excluded. This is similar to the previous study.  4. A mildly dilated fluid-filled small bowel loops may represent ileus  or acute nonspecific enteritis. No evidence of small bowel obstruction.  5. A gastrostomy tube in place. Moderate gas and fluid-filled distended  stomach.     The above study was initially reviewed and reported by [employer]. I do not  find any discrepancies.                                      This report was signed and finalized on 7/15/2024 7:50 AM by Dr. Lui Bush MD.       XR Chest 1 View [[id number]] Collected: 07/15/24 0608     Updated: 07/15/24 0614    Narrative:      EXAMINATION: XR CHEST 1 VW-     7/14/2024 9:22 PM     HISTORY: fever     A frontal projection of the chest is compared with the previous study  dated 7/3/2024.     The lungs are poorly expanded, right worse than the left.     The medial half of the diaphragm is obliterated by the adjacent  consolidation/infiltrate.     There are atelectatic changes in the right lung which have moderately  improved since the previous study.     There is no pleural effusion, pulmonary congestion or pneumothorax.     There is possible moderate cardiomegaly.     There is no acute bony abnormality.     Tracheostomy tube is in place.       Impression:       1. Possible left lower lobar consolidation/infiltrate which may  represent an inflammatory/infectious process.           This report was signed and finalized on 7/15/2024 6:11 AM by Dr. Lui Bush MD.               I have reviewed the patient's current medications.     baclofen, 10 mg, Per G Tube, TID  bisacodyl, 10 mg, Rectal, Daily  cefTRIAXone, 2,000 mg, Intravenous, Q24H  cetirizine, 10 mg, Per G Tube, Daily  enoxaparin, 40 mg, Subcutaneous, Daily  guaifenesin, 600 mg, Per G Tube, BID  polyethylene glycol, 17 g, Per G Tube, Daily  rosuvastatin, 5 mg, Per G Tube, Nightly  sodium chloride, 10 mL, Intravenous, Q12H         Assessment/Plan   Assessment  Active Hospital Problems    Diagnosis     **Sepsis secondary to UTI     Presence of externally removable percutaneous endoscopic gastrostomy (PEG) tube     History of anoxic brain injury     Functional quadriplegia     Tracheostomy present     Ileus        Treatment Plan  1.  Sepsis secondary to UTI-7/14/2024 septic state with fever of 101.7, tachycardia at 121, WBC of 18.8, with obvious source of infection, as urinalysis revealed turbid urine with hematuria and nitrates, leukocytes, and 4+ bacteria.  Patient has had multiple UTIs over the last several months last of which was 5/2024 with ESBL and Proteus  Mirabilis, which was susceptible to ceftriaxone.  Continue 2 g ceftriaxone until cultures are available.  Lopressor 2.5 every 12 initiated for remittent sinus tachycardia.    2.  History of anoxic brain injury/functional quadriplegia-according to nursing facility patient is at her baseline, they transferred her due to possible trach dislodgment.  Patient appears at her baseline, she continues to be nonverbal, will follow commands with her eyes however is unable to move any extremities.  Patient is in a chronically severely debilitated state, consult initiated with palliative care to discuss goals of care with patient's family.    3.  Tracheostomy  present-patient was brought to the ED due to possible trach dislodgment.  After MD and [facility] ED evaluated, it was caked in debris and old dried accretions.  Once that was cleaned and reassess tracheostomy was in place and patient resumed home tracheostomy immunity.  Currently patient is stable on 9 L tracheostomy collar, oxygen saturation is 94% at present.  Continue tracheostomy care protocol, ABGs as needed, pulmonary hygiene and suctioning as needed.    4.  Ileus-patient CT scan on admission revealed possible ileus or acute nonspecific enteritis.  Follow-up KUB revealed an. adynamic ileus.  Dulcolax suppository and milk and molasses enema administered this a.m. with some results.  Will hold advancement of dosage of tube feeding for 24 hours to reevaluate.    5.  Presence of externally removable percutaneous endoscopic gastrostomy tube-patient was transferred per SNF with complaints of possible dislodgment of PEG tube.  As previously stated her PEG tube was covered in debris and old secretions and appeared to be dislodged, however once PEG tube was cleaned reevaluated per CT and KUB.  It is currently in its proper place, will continue PEG tube care per protocol.  Patient has been evaluated by dietitian and will resume tube feed at 20 mL/h with no advancement due to possible ileus.    VTE prophylaxis Lovenox 40 mg.  Labs in a.m.    Medical Decision Making  Number and Complexity of problems: 5  Differential Diagnosis: None    Conditions and Status        Condition is unchanged.     [facility] Data  [facility] and [facility] progress [facility], and initial PEG tube insertion per .  CODE STATUS-DNR/DNI per previous documentation  Cardiac tracing (EKG, telemetry) interpretation: 4/1/2024 EKG reviewed, overnight cardiac telemetry sinus tach 116-143  Radiology interpretation: 7/14-chest x-ray, CT chest reviewed per radiology.  KUB 7/15/2024 reviewed per radiology  Labs reviewed:    7/14/2024-COVID, urinalysis with culture, CBC and differential, CMP, lactic acid.  Reviewed  /15/2024 BMP, CBC reviewed, repeat in a.m. urine and blood cultures remain pending  Any tests that were considered but not ordered: None     Decision rules/scores evaluated (example [id number], [place], etc): None  Discussed with: Dr. Charles, Dr. Dyer, urology and patient     Care Planning  Shared decision making: Dr. Charles, Dr. Dyer, urology and patient  Code status and discussions: DNR/DNI per documentation with patient upon admission.  Surrogate Decision Maker her mother Marquita    Disposition  Social Determinants of Health that impact treatment or disposition: Patient is a resident of [facility], will will need to return at discharge.  I expect the patient to be discharged to SNF in unknown days.     Electronically signed by BERT Asif, 07/15/24, 13:41 CDT.

## 2024-07-15 NOTE — PLAN OF CARE
Pt given suppository and M&M enema with good results so far; IVF and IV abx; turn q2hr; tube feeding initiated at 20/hr; trach collar O2 in place; Tele-sinus tach 107-148, scheduled metoprolol added; safety maintained.

## 2024-07-15 NOTE — ED NOTES
Nursing report ED to floor  Namita Zabala  49 y.o.  female    HPI:   Chief Complaint   Patient presents with    tracheostomy and PEg Tube malfunction       Admitting doctor:   David Hernández MD    Consulting provider(s):  Consults       No orders found for last 30 day(s).             Admitting diagnosis:   The primary encounter diagnosis was Acute UTI (urinary tract infection). A diagnosis of Sepsis, due to unspecified organism, unspecified whether acute organ dysfunction present was also pertinent to this visit.    Code status:   Current Code Status       Date Active Code Status Order ID Comments User Context       Prior            Allergies:   Coconut, Levaquin [levofloxacin], Nuts, Penicillins, and [place]    Intake and Output    Intake/Output Summary (Last 24 hours) at 7/15/2024 0233  Last data filed at 7/15/2024 0110  Gross per 24 hour   Intake 1100 ml   Output --   Net 1100 ml       Weight:       07/14/24 2202   Weight: 59 kg (130 lb 1.1 oz)       Most recent vitals:   Vitals:    07/15/24 0040 07/15/24 0101 07/15/24 0116 07/15/24 0216   BP:  102/58 103/61 96/55   BP Location:       Patient Position:       Pulse: 109 109 108 107   Resp:       Temp:       TempSrc:       SpO2: 94% 95% 94% 97%   Weight:         Oxygen Therapy: .    Active LDAs/IV Access:   Lines, Drains & Airways       Active LDAs       Name Placement date Placement time Site Days    Peripheral IV 07/14/24 2231 Posterior;Right Hand 07/14/24 2231  Hand  less than 1    Nephrostomy Left --  present on assessment  --  present on assessment  Left  --    Gastrostomy/Enterostomy  LUQ 02/08/24  1230  LUQ  157    Urethral Catheter Non-latex 05/03/24 2050  -- 72    Surgical Airway Uncuffed 6 --  --  6  --    Surgical Airway Shiley Uncuffed 6 04/05/24  --  6  101    Surgical Airway Shiley Uncuffed 6.5 --  --  6.5  --                    Labs (abnormal labs have a star):   Labs Reviewed   COMPREHENSIVE METABOLIC PANEL - Abnormal; Notable for  the following components:       Result Value    Glucose 141 (*)     BUN 29 (*)     Creatinine 0.46 (*)     BUN/Creatinine Ratio 63.0 (*)     All other components within normal limits    Narrative:     GFR Normal >60  Chronic Kidney Disease <60  Kidney Failure <15     URINALYSIS W/ CULTURE IF INDICATED - Abnormal; Notable for the following components:    Color, UA Dark Yellow (*)     Appearance, UA Turbid (*)     pH, UA >=9.0 (*)     Blood, UA Moderate (2+) (*)     Protein, UA >=300 mg/dL (3+) (*)     Leuk Esterase, UA Large (3+) (*)     Nitrite, UA Positive (*)     All other components within normal limits    Narrative:     In absence of clinical symptoms, the presence of pyuria, bacteria, and/or nitrites on the urinalysis result does not correlate with infection.   CBC WITH AUTO DIFFERENTIAL - Abnormal; Notable for the following components:    WBC 18.80 (*)     Hemoglobin 11.4 (*)     MCHC 31.2 (*)     Neutrophil % 88.3 (*)     Lymphocyte % 5.5 (*)     Monocyte % 4.8 (*)     Immature Grans % 0.6 (*)     Neutrophils, Absolute 16.59 (*)     Monocytes, Absolute 0.91 (*)     Immature Grans, Absolute 0.11 (*)     All other components within normal limits   URINALYSIS, MICROSCOPIC ONLY - Abnormal; Notable for the following components:    RBC, UA 6-10 (*)     WBC, UA 11-20 (*)     Bacteria, UA 4+ (*)     All other components within normal limits   COVID-19 AND FLU A/B, NP SWAB IN TRANSPORT MEDIA 1 HR TAT - Normal    Narrative:     Fact sheet for providers: https://www.fda.gov/media/538156/download    Fact sheet for patients: https://www.fda.gov/media/766561/download    Test performed by PCR.   LACTIC ACID, PLASMA - Normal   COVID PRE-OP / PRE-PROCEDURE SCREENING ORDER (NO ISOLATION)    Narrative:     The following orders were created for panel order COVID PRE-OP / PRE-PROCEDURE SCREENING ORDER (NO ISOLATION) - Swab, Nasal Cavity.  Procedure                               Abnormality         Status                      ---------                               -----------         ------                     COVID-19 and FLU A/B PCR...[[id number]]  Normal              Final result                 Please view results for these tests on the individual orders.   URINE CULTURE   BLOOD CULTURE   BLOOD CULTURE   CBC AND DIFFERENTIAL    Narrative:     The following orders were created for panel order CBC & Differential.  Procedure                               Abnormality         Status                     ---------                               -----------         ------                     CBC Auto Differential[[id number]]        Abnormal            Final result                 Please view results for these tests on the individual orders.       Meds given in ED:   Medications   sodium chloride 0.9 % bolus 1,000 mL (0 mL Intravenous Stopped 7/15/24 0110)   acetaminophen (TYLENOL) suppository 650 mg (650 mg Rectal Given 7/14/24 2228)   cefTRIAXone (ROCEPHIN) 1,000 mg in sodium chloride 0.9 % 100 mL MBP (0 mg Intravenous Stopped 7/14/24 2344)   iopamidol (ISOVUE-300) 61 % injection 100 mL (100 mL Intravenous Given 7/15/24 0031)   sodium chloride 0.9 % bolus 1,000 mL (1,000 mL Intravenous New Bag 7/15/24 0111)     No current facility-administered medications for this encounter.       NIH Stroke Scale:       Isolation/Infection(s):  No active isolations   ESBL Klebsiella, ESBL E coli, CR Pseudomonas CRPA     COVID Testing  Collected . Yes   Resulted . negative    Nursing report ED to floor:  Mental status: . Alert, non-speaking  Ambulatory status: . Bed confined  Precautions: . Trach, constricted    ED nurse phone extentsion- ..

## 2024-07-15 NOTE — CONSULTS
Inpatient Nutrition Services  Patient Name:  Namita Zabala  YOB: 1975  MRN: [id number]  Admit Date:  7/14/2024  Assessment Date:  7/15/2024     Reason for Assessment       Row Name 07/15/24 1026          Reason for Assessment    Reason For Assessment physician consult;per organizational policy;TF/PN     Diagnosis neurologic conditions;infection/sepsis;pulmonary disease;nutrition related history          Nutrition/Diet History       Row Name 07/15/24 1027          Nutrition/Diet History    Typical Intake (Food/Fluid/EN/PN) PEG unclogged. MedLine, 16Fr tube with two side ports. RN awaiting KUB to confirm placement. REC bowel regimen to reduce stool burden. CT A/P shows possible fecal impaction. Trach in place, UOP clear and yellow. When medically appropriate, start Peptamen 1.5 at 25 mL/hr. Increase 10 mL every 6 hr as tolerated to final goal rate of 45 mL/hr. Free water 45 mL/hr.     Food Intolerance(s) Food allergies: turkey, peanuts, nuts, coconut     Enteral Nutrition Regimen PEG for sole-source nutrition     Factors Affecting Nutritional Intake cognitive status/motor function;enteral/parenteral device(s);respiratory difficulty/therapies;restricted diet         Nutrition Prescription       Row Name 07/15/24 1034          Nutrition Prescription PO    PO Prescription Other (comment)  defer to RN/MD to determine if SLP consult warranted during this admission        Nutrition Prescription EN    Enteral Prescription Enteral begin/change;Enteral to supply     Enteral Route PEG     Product Peptamen 1.5 darryl     TF Delivery Method Continuous     Continuous TF Goal Rate (mL/hr) 45 mL/hr     Continuous TF Starting Rate (mL/hr) 25 mL/hr     Continuous TF Goal Volume (mL) 990 mL     Continuous TF Starting Volume (mL) 550 mL     Water flush (mL)  45 mL     Water Flush Frequency Per hour     New EN Prescription Ordered? Yes        EN to Supply    Kcal/Day 1485 Kcal/Day     Kcal/Kg 25 Kcal/Kg     Kcal/Kg  Weight Method Actual weight     Protein (gm/day) 67 gm/day     Meet Estimated Kcal Need (%) 101 %     Meet Estimated Protein Need (%) 103 %     TF Free H2O (mL) 760 mL     Total Free H2O (mL/day) 1750 mL/day     Fiber Per Day (gm/day) 0 gm/day       Labs/Tests/Procedures/Meds       Row Name 07/15/24 1030          Labs/Procedures/Meds    Lab Results Reviewed reviewed     Lab Results Comments Cl, Glu, BUN, WBC, H/H        Diagnostic Tests/Procedures    Diagnostic Test/Procedure Reviewed reviewed     Diagnostic Test/Procedures Comments CT A/P, KUB pending        Medications    Pertinent Medications Reviewed reviewed     Pertinent Medications Comments See MAR          Physical Findings       Row Name 07/15/24 1031          Physical Findings    Overall Physical Appearance Trach, PEG, BM unknown, Pritesh Score 11, no skin breakdown, chronic nephrostomy tube and urethral catheter.          Estimated/Assessed Needs - Anthropometrics       Row Name 07/15/24 1032          Anthropometrics    Weight for Calculation 59 kg (130 lb 1.1 oz)        Usual Body Weight (UBW)    Weight Change (Amount and Duration) Weight trend variable; 120s-140s.        Estimated/Assessed Needs    Additional Documentation Fluid Requirements (Group);KCAL/KG (Group);Protein Requirements (Group)        KCAL/KG    KCAL/KG 25 Kcal/Kg (kcal)     25 Kcal/Kg (kcal) 1475        Protein Requirements    Weight Used For Protein Calculations 59.1 kg (130 lb 4.7 oz)     Est Protein Requirement Amount (gms/kg) 1.1 gm protein     Estimated Protein Requirements (gms/day) 65.01        Fluid Requirements    Fluid Requirements (mL/day) 1770  30mL/kg     RDA Method (mL) 1770          Evaluation of Received Nutrient/Fluid Intake       Row Name 07/15/24 1033          Calories Evaluation    Total Calorie Target (kcal) 1475        Protein Evaluation    Total Protein Target (g) 65        Fluid Intake Evaluation    Total Fluid Target (mL) 1770         Problem/Interventions:    Problem 1       Row Name 07/15/24 1033          Nutrition Diagnoses Problem 1    Problem 1 Needs Alternate     Etiology (related to) Medical Diagnosis     Gastrointestinal Other (comment)  PEG     Neurological Anoxic brain injury;Other (comment)  with dysphagia     Pulmonary/Critical Care Other (comment)  trach dependent     Signs/Symptoms (evidenced by) NPO;EN Intake Delivery          Intervention Goal       Row Name 07/15/24 1033          Intervention Goal    General Disease management/therapy;Nutrition support treatment     TF/PN Tolerate TF at goal;Establish TF tolerance;Deliver (%) goal;Deliver estimated need (%)     Deliver % of Goal 75 %  greater than     Deliver % of Estimated Need 80 %  greater than     Weight Maintain weight               Nutrition Intervention       Row Name 07/15/24 1034          Nutrition Intervention    RD/Tech Action Care plan reviewd;Follow Tx progress;Recommend/ordered     Recommended/Ordered EN            Education/Evaluation       Row Name 07/15/24 1035          Education    Education No discharge needs identified at this time RN reports pt to return to SNF       Monitor/Evaluation    Monitor Per protocol       Electronically signed by:  Morelia Warner RDN, LINDA  07/15/24 10:36 CDT

## 2024-07-15 NOTE — DISCHARGE PLACEMENT REQUEST
"Gerry Cooper N (49 y.o. Female)       Date of Birth   1975    Social Security Number       Address   [address] [place], [place] [address]    Home Phone   [phone]    MRN   [id number]       [facility]    Marital Status                               Admission Date   7/14/24    Admission Type   Emergency    Admitting Provider   Tae Charles MD    Attending Provider   Tae Charles MD    Department, Room/Bed   [facility] 3C, 385/1       Discharge Date       Discharge Disposition       Discharge Destination                                 Attending Provider: Tae Charles MD    Allergies: Coconut, Levaquin [Levofloxacin], Nuts, Penicillins, Turkey    Isolation: None   Infection: ESBL Klebsiella (04/07/22), ESBL E coli (10/13/23), CR Pseudomonas CRPA (04/22/24)   Code Status: No CPR    Ht: 160 cm (63\")   Wt: 59 kg (130 lb 1.1 oz)    Admission Cmt: None   Principal Problem: Sepsis secondary to UTI [A41.9,N39.0]                   Active Insurance as of 7/14/2024       Primary Coverage       Payor Plan Insurance Group Employer/Plan Group    MEDICARE MEDICARE A & B        Payor Plan Address Payor Plan Phone Number Payor Plan Fax Number Effective Dates    [address] [phone]  7/1/2019 - None Entered    [facility] [id number]         Subscriber Name Subscriber Birth Date Member ID       GERRY COOPER N 1975 [id number]               Secondary Coverage       Payor Plan Insurance Group Employer/Plan Group    [place]        Payor Plan Address Payor Plan Phone Number Payor Plan Fax Number Effective Dates    [address] [phone]  9/15/2023 - None Entered    [place] [address]         Subscriber Name Subscriber Birth Date Member ID       GERRY COOPER N 1975 [id number]                     Emergency Contacts        (Rel.) Home Phone Work Phone Mobile Phone    Noel Johnson (Father) [phone] -- " [phone]    Marquita Johnson (Mother) -- -- [phone]    Neida Zabala (Relative) -- -- [phone]

## 2024-07-16 LAB
ANION GAP SERPL CALCULATED.3IONS-SCNC: 12 MMOL/L (ref 5–15)
BASOPHILS # BLD AUTO: 0.05 10*3/MM3 (ref 0–0.2)
BASOPHILS NFR BLD AUTO: 0.4 % (ref 0–1.5)
BUN SERPL-MCNC: 20 MG/DL (ref 6–20)
BUN/CREAT SERPL: 58.8 (ref 7–25)
CALCIUM SPEC-SCNC: 9.5 MG/DL (ref 8.6–10.5)
CHLORIDE SERPL-SCNC: 107 MMOL/L (ref 98–107)
CO2 SERPL-SCNC: 22 MMOL/L (ref 22–29)
CREAT SERPL-MCNC: 0.34 MG/DL (ref 0.57–1)
DEPRECATED RDW RBC AUTO: 43.6 FL (ref 37–54)
EGFRCR SERPLBLD CKD-EPI 2021: 127.9 ML/MIN/1.73
EOSINOPHIL # BLD AUTO: 0.47 10*3/MM3 (ref 0–0.4)
EOSINOPHIL NFR BLD AUTO: 3.5 % (ref 0.3–6.2)
ERYTHROCYTE [DISTWIDTH] IN BLOOD BY AUTOMATED COUNT: 13.1 % (ref 12.3–15.4)
GLUCOSE BLDC GLUCOMTR-MCNC: 100 MG/DL (ref 70–130)
GLUCOSE BLDC GLUCOMTR-MCNC: 117 MG/DL (ref 70–130)
GLUCOSE BLDC GLUCOMTR-MCNC: 99 MG/DL (ref 70–130)
GLUCOSE SERPL-MCNC: 109 MG/DL (ref 65–99)
HCT VFR BLD AUTO: 32.4 % (ref 34–46.6)
HGB BLD-MCNC: 10.1 G/DL (ref 12–15.9)
IMM GRANULOCYTES # BLD AUTO: 0.07 10*3/MM3 (ref 0–0.05)
IMM GRANULOCYTES NFR BLD AUTO: 0.5 % (ref 0–0.5)
LYMPHOCYTES # BLD AUTO: 1.19 10*3/MM3 (ref 0.7–3.1)
LYMPHOCYTES NFR BLD AUTO: 8.8 % (ref 19.6–45.3)
MCH RBC QN AUTO: 28.8 PG (ref 26.6–33)
MCHC RBC AUTO-ENTMCNC: 31.2 G/DL (ref 31.5–35.7)
MCV RBC AUTO: 92.3 FL (ref 79–97)
MONOCYTES # BLD AUTO: 0.48 10*3/MM3 (ref 0.1–0.9)
MONOCYTES NFR BLD AUTO: 3.5 % (ref 5–12)
NEUTROPHILS NFR BLD AUTO: 11.34 10*3/MM3 (ref 1.7–7)
NEUTROPHILS NFR BLD AUTO: 83.3 % (ref 42.7–76)
NRBC BLD AUTO-RTO: 0 /100 WBC (ref 0–0.2)
PLATELET # BLD AUTO: 245 10*3/MM3 (ref 140–450)
PMV BLD AUTO: 10.8 FL (ref 6–12)
POTASSIUM SERPL-SCNC: 4.3 MMOL/L (ref 3.5–5.2)
RBC # BLD AUTO: 3.51 10*6/MM3 (ref 3.77–5.28)
SODIUM SERPL-SCNC: 141 MMOL/L (ref 136–145)
WBC NRBC COR # BLD AUTO: 13.6 10*3/MM3 (ref 3.4–10.8)

## 2024-07-16 PROCEDURE — 25810000003 SODIUM CHLORIDE 0.9 % SOLUTION: Performed by: FAMILY MEDICINE

## 2024-07-16 PROCEDURE — 25010000002 ENOXAPARIN PER 10 MG: Performed by: FAMILY MEDICINE

## 2024-07-16 PROCEDURE — 93010 ELECTROCARDIOGRAM REPORT: CPT | Performed by: INTERNAL MEDICINE

## 2024-07-16 PROCEDURE — 80048 BASIC METABOLIC PNL TOTAL CA: CPT | Performed by: FAMILY MEDICINE

## 2024-07-16 PROCEDURE — 82948 REAGENT STRIP/BLOOD GLUCOSE: CPT

## 2024-07-16 PROCEDURE — 25010000002 CEFTRIAXONE PER 250 MG: Performed by: FAMILY MEDICINE

## 2024-07-16 PROCEDURE — 94799 UNLISTED PULMONARY SVC/PX: CPT

## 2024-07-16 PROCEDURE — 99232 SBSQ HOSP IP/OBS MODERATE 35: CPT

## 2024-07-16 PROCEDURE — 93005 ELECTROCARDIOGRAM TRACING: CPT

## 2024-07-16 PROCEDURE — 85025 COMPLETE CBC W/AUTO DIFF WBC: CPT | Performed by: FAMILY MEDICINE

## 2024-07-16 RX ORDER — METOPROLOL TARTRATE 50 MG/1
50 TABLET, FILM COATED ORAL 2 TIMES DAILY
Status: DISCONTINUED | OUTPATIENT
Start: 2024-07-16 | End: 2024-07-22 | Stop reason: HOSPADM

## 2024-07-16 RX ORDER — METOPROLOL TARTRATE 1 MG/ML
5 INJECTION, SOLUTION INTRAVENOUS EVERY 6 HOURS
Status: DISCONTINUED | OUTPATIENT
Start: 2024-07-16 | End: 2024-07-16

## 2024-07-16 RX ORDER — TIZANIDINE 4 MG/1
4 TABLET ORAL EVERY 8 HOURS
COMMUNITY

## 2024-07-16 RX ORDER — TIZANIDINE 4 MG/1
4 TABLET ORAL EVERY 8 HOURS SCHEDULED
Status: DISCONTINUED | OUTPATIENT
Start: 2024-07-16 | End: 2024-07-22 | Stop reason: HOSPADM

## 2024-07-16 RX ORDER — POTASSIUM CHLORIDE 20MEQ/15ML
20 LIQUID (ML) ORAL 2 TIMES DAILY
COMMUNITY

## 2024-07-16 RX ORDER — FERROUS SULFATE 300 MG/5ML
300 LIQUID (ML) ORAL 2 TIMES DAILY
COMMUNITY

## 2024-07-16 RX ORDER — SACCHAROMYCES BOULARDII 250 MG
250 CAPSULE ORAL DAILY
Status: DISCONTINUED | OUTPATIENT
Start: 2024-07-16 | End: 2024-07-22 | Stop reason: HOSPADM

## 2024-07-16 RX ORDER — SACCHAROMYCES BOULARDII 250 MG
250 CAPSULE ORAL DAILY
COMMUNITY

## 2024-07-16 RX ORDER — HYDROCODONE BITARTRATE AND ACETAMINOPHEN 7.5; 325 MG/1; MG/1
1 TABLET ORAL EVERY 4 HOURS PRN
COMMUNITY

## 2024-07-16 RX ORDER — BACLOFEN 10 MG/1
40 TABLET ORAL 3 TIMES DAILY
Status: DISCONTINUED | OUTPATIENT
Start: 2024-07-16 | End: 2024-07-22 | Stop reason: HOSPADM

## 2024-07-16 RX ORDER — METOPROLOL TARTRATE 50 MG/1
50 TABLET, FILM COATED ORAL 2 TIMES DAILY
COMMUNITY

## 2024-07-16 RX ORDER — DOCUSATE SODIUM 50 MG/5ML
150 LIQUID ORAL 2 TIMES DAILY
COMMUNITY
End: 2024-07-22 | Stop reason: HOSPADM

## 2024-07-16 RX ORDER — BACLOFEN 20 MG/1
40 TABLET ORAL 3 TIMES DAILY
COMMUNITY

## 2024-07-16 RX ORDER — HYDROCODONE BITARTRATE AND ACETAMINOPHEN 7.5; 325 MG/1; MG/1
1 TABLET ORAL EVERY 4 HOURS PRN
Status: DISCONTINUED | OUTPATIENT
Start: 2024-07-16 | End: 2024-07-22 | Stop reason: HOSPADM

## 2024-07-16 RX ORDER — PANTOPRAZOLE SODIUM 40 MG/1
40 TABLET, DELAYED RELEASE ORAL 2 TIMES DAILY
COMMUNITY

## 2024-07-16 RX ORDER — FERROUS SULFATE 300 MG/5ML
300 LIQUID (ML) ORAL 2 TIMES DAILY
Status: DISCONTINUED | OUTPATIENT
Start: 2024-07-16 | End: 2024-07-22 | Stop reason: HOSPADM

## 2024-07-16 RX ORDER — PANTOPRAZOLE SODIUM 40 MG/1
40 TABLET, DELAYED RELEASE ORAL 2 TIMES DAILY
Status: DISCONTINUED | OUTPATIENT
Start: 2024-07-16 | End: 2024-07-22 | Stop reason: HOSPADM

## 2024-07-16 RX ORDER — POTASSIUM CHLORIDE 20MEQ/15ML
20 LIQUID (ML) ORAL 2 TIMES DAILY
Status: DISCONTINUED | OUTPATIENT
Start: 2024-07-16 | End: 2024-07-22 | Stop reason: HOSPADM

## 2024-07-16 RX ORDER — METOPROLOL TARTRATE 1 MG/ML
2.5 INJECTION, SOLUTION INTRAVENOUS ONCE
Status: COMPLETED | OUTPATIENT
Start: 2024-07-16 | End: 2024-07-16

## 2024-07-16 RX ORDER — ALBUTEROL SULFATE 2.5 MG/3ML
2.5 SOLUTION RESPIRATORY (INHALATION) EVERY 6 HOURS PRN
COMMUNITY

## 2024-07-16 RX ORDER — METOPROLOL TARTRATE 1 MG/ML
5 INJECTION, SOLUTION INTRAVENOUS EVERY 12 HOURS
Status: DISCONTINUED | OUTPATIENT
Start: 2024-07-16 | End: 2024-07-16

## 2024-07-16 RX ADMIN — BACLOFEN 40 MG: 10 TABLET ORAL at 21:22

## 2024-07-16 RX ADMIN — DOCUSATE SODIUM 50 MG AND SENNOSIDES 8.6 MG 2 TABLET: 8.6; 5 TABLET, FILM COATED ORAL at 08:19

## 2024-07-16 RX ADMIN — CETIRIZINE HYDROCHLORIDE 10 MG: 10 TABLET ORAL at 08:19

## 2024-07-16 RX ADMIN — MINERAL SUPPLEMENT IRON 300 MG / 5 ML STRENGTH LIQUID 100 PER BOX UNFLAVORED 300 MG: at 21:22

## 2024-07-16 RX ADMIN — CEFTRIAXONE SODIUM 2000 MG: 2 INJECTION, POWDER, FOR SOLUTION INTRAMUSCULAR; INTRAVENOUS at 21:22

## 2024-07-16 RX ADMIN — METOPROLOL TARTRATE 5 MG: 5 INJECTION INTRAVENOUS at 14:29

## 2024-07-16 RX ADMIN — ENOXAPARIN SODIUM 40 MG: 100 INJECTION SUBCUTANEOUS at 08:20

## 2024-07-16 RX ADMIN — Medication 20 MG: at 03:56

## 2024-07-16 RX ADMIN — POTASSIUM CHLORIDE 20 MEQ: 20 SOLUTION ORAL at 21:22

## 2024-07-16 RX ADMIN — THIAMINE HCL TAB 100 MG 100 MG: 100 TAB at 16:02

## 2024-07-16 RX ADMIN — SODIUM CHLORIDE 100 ML/HR: 9 INJECTION, SOLUTION INTRAVENOUS at 10:07

## 2024-07-16 RX ADMIN — METOPROLOL TARTRATE 2.5 MG: 5 INJECTION INTRAVENOUS at 05:39

## 2024-07-16 RX ADMIN — Medication 20 MG: at 08:22

## 2024-07-16 RX ADMIN — PANTOPRAZOLE SODIUM 40 MG: 40 TABLET, DELAYED RELEASE ORAL at 21:22

## 2024-07-16 RX ADMIN — GUAIFENESIN 600 MG: 200 SOLUTION ORAL at 21:22

## 2024-07-16 RX ADMIN — METOPROLOL TARTRATE 2.5 MG: 5 INJECTION INTRAVENOUS at 08:19

## 2024-07-16 RX ADMIN — ROSUVASTATIN CALCIUM 5 MG: 5 TABLET, FILM COATED ORAL at 21:22

## 2024-07-16 RX ADMIN — GUAIFENESIN 600 MG: 200 SOLUTION ORAL at 08:19

## 2024-07-16 RX ADMIN — NYSTATIN 1 APPLICATION: 100000 POWDER TOPICAL at 08:22

## 2024-07-16 RX ADMIN — FLUTICASONE PROPIONATE 2 SPRAY: 50 SPRAY, METERED NASAL at 05:43

## 2024-07-16 RX ADMIN — CHLORHEXIDINE GLUCONATE 0.12% ORAL RINSE 15 ML: 1.2 LIQUID ORAL at 21:22

## 2024-07-16 RX ADMIN — BACLOFEN 10 MG: 10 TABLET ORAL at 08:19

## 2024-07-16 RX ADMIN — Medication 20 MG: at 21:23

## 2024-07-16 RX ADMIN — ACETAMINOPHEN 650 MG: 325 TABLET, FILM COATED ORAL at 16:02

## 2024-07-16 RX ADMIN — NYSTATIN 1 APPLICATION: 100000 POWDER TOPICAL at 21:23

## 2024-07-16 RX ADMIN — TIZANIDINE 4 MG: 4 TABLET ORAL at 16:02

## 2024-07-16 RX ADMIN — DOCUSATE SODIUM 150 MG: 50 LIQUID ORAL at 21:22

## 2024-07-16 RX ADMIN — SODIUM CHLORIDE 100 ML/HR: 9 INJECTION, SOLUTION INTRAVENOUS at 18:39

## 2024-07-16 RX ADMIN — METOPROLOL TARTRATE 50 MG: 50 TABLET, FILM COATED ORAL at 21:22

## 2024-07-16 RX ADMIN — Medication 10 ML: at 08:22

## 2024-07-16 RX ADMIN — BACLOFEN 40 MG: 10 TABLET ORAL at 16:02

## 2024-07-16 RX ADMIN — POLYETHYLENE GLYCOL 3350 17 G: 17 POWDER, FOR SOLUTION ORAL at 08:19

## 2024-07-16 RX ADMIN — Medication 250 MG: at 16:02

## 2024-07-16 RX ADMIN — Medication 20 MG: at 16:03

## 2024-07-16 RX ADMIN — TIZANIDINE 4 MG: 4 TABLET ORAL at 21:22

## 2024-07-16 NOTE — PROGRESS NOTES
[facility] Palliative Care Services  Progress Note  Patient Name: Namita Zabala  Date of Admission: 2024  Today's Date: 24     Code Status and Medical Interventions:   Ordered at: 07/15/24 0243     Medical Intervention Limits:    No intubation (DNI)    No cardioversion     Code Status (Patient has no pulse and is not breathing):    No CPR (Do Not Attempt to Resuscitate)     Medical Interventions (Patient has pulse or is breathing):    Limited Support     Subjective   Chief complaint/Reason for Referral/Visit: Follow up on Goals of Care/Advance Care Planning.    Medical record reviewed. Events noted.  Labs reviewed.  WBC trending downward.   She is lying in bed, asleep and in no apparent distress.  Arouses to voice and will open eyes however unable to verbalize or follow commands.  No visitors at bedside.  Discussed with nursing and attending briefly.     Mrs. Zabala's parents, Noel and Marquita, are her next of kin as her spouse recently passed away and she has no children.  Had goals of care discussion with them on 7/15/2024.  Reported they will be at their son-in-laws  service today however plans to come visit later this afternoon/tonight.  Will plan to follow-up and have further discussion regarding goals of care and treatment options with them tomorrow as they are at  service today.  Updated nursing.     Advanced Directives: No advance directive on file.     The patient receives support from her parents. Patient's parents are her next of kin.  Goals of care: Ongoing.    Review of Systems   Unable to perform ROS: Patient nonverbal     Pain Assessment  CPOT and PAINAD Scales: PAINAD (Pain Assessment in Advance Dementia Scale)  PAINAD Breathin-->normal  PAINAD Negative Vocalization: 0-->none  PAINAD Facial Expression: 0-->smiling or inexpressive  PAINAD Body Language: 0-->relaxed  PAINAD Consolability: 0-->no need to console  PAINAD Score: 0  Objective    Diagnostics: Reviewed      Intake/Output Summary (Last 24 hours) at 7/16/2024 1017  Last data filed at 7/16/2024 0617  Gross per 24 hour   Intake 45 ml   Output 600 ml   Net -555 ml     Current Facility-Administered Medications   Medication Dose Route Frequency Provider Last Rate Last Admin    acetaminophen (TYLENOL) tablet 650 mg  650 mg Per G Tube Q4H PRN David Hernández MD        Or    acetaminophen (TYLENOL) 160 MG/5ML oral solution 650 mg  650 mg Per G Tube Q4H PRN David Hernández MD        Or    acetaminophen (TYLENOL) suppository 650 mg  650 mg Rectal Q4H PRN David Hernández MD   650 mg at 07/15/24 0522    artificial tears ophthalmic ointment   Both Eyes Q1H PRN Montserrat Feliciano APRN        baclofen (LIORESAL) tablet 10 mg  10 mg Per G Tube TID David Hernández MD   10 mg at 07/16/24 0819    bisacodyl (DULCOLAX) suppository 10 mg  10 mg Rectal Q48H PRN David Hernández MD        bisacodyl (DULCOLAX) suppository 10 mg  10 mg Rectal Daily Montserrat Feliciano APRN   10 mg at 07/15/24 1116    cefTRIAXone (ROCEPHIN) 2,000 mg in sodium chloride 0.9 % 100 mL MBP  2,000 mg Intravenous Q24H David Hernández  mL/hr at 07/15/24 2122 2,000 mg at 07/15/24 2122    cetirizine (zyrTEC) tablet 10 mg  10 mg Per G Tube Daily David Hernández MD   10 mg at 07/16/24 0819    chlorhexidine (PERIDEX) 0.12 % solution 15 mL  15 mL Mouth/Throat BID Montserrat Feliciano APRN   15 mL at 07/15/24 2122    Enoxaparin Sodium (LOVENOX) syringe 40 mg  40 mg Subcutaneous Daily David Hernández MD   40 mg at 07/16/24 0820    fleet enema 1 enema  1 enema Rectal Q48H PRN David Hernández MD        fluticasone (FLONASE) 50 MCG/ACT nasal spray 2 spray  2 spray Nasal Montserrat Mccarthy APRN   2 spray at 07/16/24 0543    guaifenesin (ROBITUSSIN) 100 MG/5ML liquid 600 mg  600 mg Per G Tube BID David Hernández MD   600 mg at 07/16/24 0819    ipratropium-albuterol  (DUO-NEB) nebulizer solution 3 mL  3 mL Nebulization Q4H PRN David Hernández MD        ketorolac (TORADOL) injection 30 mg  30 mg Intravenous Q6H PRN Montserrat Feliciano APRN   30 mg at 07/15/24 1700    metoprolol tartrate (LOPRESSOR) injection 5 mg  5 mg Intravenous Q12H Montserrat Feliciano APRN        nystatin (MYCOSTATIN) powder 1 Application  1 Application Topical BID Montserrat Feliciano APRN   1 Application at 07/16/24 0822    ondansetron (ZOFRAN) 4 MG/5ML oral solution 4 mg  4 mg Oral Q6H PRN Montserrat Feliciano APRN        ondansetron (ZOFRAN) injection 4 mg  4 mg Intravenous Q6H PRN David Hernández MD        polyethylene glycol (MIRALAX) packet 17 g  17 g Per G Tube Daily David Hernández MD   17 g at 07/16/24 0819    rosuvastatin (CRESTOR) tablet 5 mg  5 mg Per G Tube Nightly David Hernández MD   5 mg at 07/15/24 2122    scopolamine patch 1 mg/72 hr  1 patch Transdermal Q72H Montserrat Feliciano APRN   1 patch at 07/15/24 1459    sennosides-docusate (PERICOLACE) 8.6-50 MG per tablet 2 tablet  2 tablet Oral BID Montserrat Feliciano APRN   2 tablet at 07/16/24 0819    simethicone (MYLICON) 40 MG/0.6ML drops 20 mg  20 mg Per G Tube Q6H Montserrat Feliciano APRN   20 mg at 07/16/24 0822    sodium chloride 0.9 % flush 10 mL  10 mL Intravenous Q12H David Hernández MD   10 mL at 07/16/24 0822    sodium chloride 0.9 % flush 10 mL  10 mL Intravenous PRN David Hernández MD        sodium chloride 0.9 % infusion 40 mL  40 mL Intravenous PRN David Hernández MD        sodium chloride 0.9 % infusion  100 mL/hr Intravenous Continuous David Hernández  mL/hr at 07/16/24 1007 100 mL/hr at 07/16/24 1007    sodium chloride nasal spray 2 spray  2 spray Each Nare PRN Montserrat Feliciano APRN        zinc oxide 20 % ointment   Topical Q4H PRN Montserrat Feliciano APRN         sodium chloride, 100 mL/hr, Last Rate: 100 mL/hr (07/16/24 1007)        acetaminophen **OR**  "acetaminophen **OR** acetaminophen    artificial tears    bisacodyl    fleet enema    ipratropium-albuterol    ketorolac    ondansetron    ondansetron    sodium chloride    sodium chloride    sodium chloride    zinc oxide  Current medications patient is presently taking including all prescriptions, over-the-counter, herbals and vitamin/mineral/dietary (nutritional) supplements with reviewed including route, type, dose and frequency and are current per MAR at time of dictation.    Assessment:  Vital Signs: /90 (BP Location: Right leg, Patient Position: Lying)   Pulse 108   Temp 99.3 °F (37.4 °C) (Axillary)   Resp 16   Ht 160 cm (63\")   Wt 59.4 kg (131 lb)   LMP  (LMP Unknown)   SpO2 96%   BMI 23.21 kg/m²     Physical Exam  Vitals and nursing note reviewed.   Constitutional:       General: She is sleeping. She is not in acute distress.     Appearance: She is ill-appearing.   HENT:      Head: Normocephalic.   Eyes:      General: Lids are normal.   Neck:      Trachea: Tracheostomy and abnormal tracheal secretions present.   Cardiovascular:      Rate and Rhythm: Tachycardia present.   Pulmonary:      Comments: 8 L oxygen via trach collar.   Musculoskeletal:      Comments: Contractures of all extremities.    Skin:     General: Skin is warm and dry.   Neurological:      Mental Status: She is easily aroused.   Psychiatric:         Speech: She is noncommunicative.     Functional status: Palliative Performance Scale Score: Performance 10% based on the following measures: Ambulation: Totally bed bound, Activity and Evidence of Disease: Unable to do any work, extensive evidence of disease, Self-Care: Total care required,  Intake: Mouth care only, LOC: Drowsy or comatose.  Nutritional status: Albumin 3.7. Body mass index is 23.21 kg/m².   Patient status: Disease state: Controlled with current treatments.    Active Hospital Problems    Diagnosis     **Sepsis secondary to UTI     Presence of externally removable " percutaneous endoscopic gastrostomy (PEG) tube     History of anoxic brain injury     Functional quadriplegia     Tracheostomy present     Ileus        Impression/Problem List:  Sepsis secondary to urinary tract infection  History of anoxic brain injury  Functional quadriplegia / Bedbound   Tracheostomy present  Presence of PEG tube  Ileus    Plan / Recommendations     Palliative Care Encounter   Goals of care include CODE STATUS NO CPR with limited support interventions.    Prognosis is poor long-term secondary to sepsis due to UTI, anoxic brain injury, functional quadriplegia, presence of tracheostomy and PEG tube, ileus and other comorbidities listed above.      Mrs. Zabala's parents, Noel and Marquita, are her next of kin as her spouse recently passed away and she has no children.       Discussed goals of care/treatment options with parents on 7/15/2024. Reported they will be at their son-in-laws  service today however plans to come visit later this afternoon/tonight.       Will plan to follow-up and have further discussion regarding goals of care and treatment options with them tomorrow as they are at  service today.  Updated nursing.     Thank you for allowing us to participate in patient's plan of care. Palliative Care Team will continue to follow patient.    Time spent:35 minutes spent reviewing medical and medication records, assessing and examining patient, discussing with family, answering questions, providing some guidance about a plan and documentation of care, and coordinating care with other healthcare members, with > 50% time spent face to face.     Electronically signed by, BERT White, 24.

## 2024-07-16 NOTE — PROGRESS NOTES
Patient Name: Namita Zabala  Date of Admission: 7/14/2024  Today's Date: 07/16/24  Length of Stay: 1  Primary Care Physician: David Lara MD    Subjective   Chief Complaint: Clogged PEG tube and displaced tracheostomy.  HPI   Namita Zabala is a 49-year-old female with a history of anoxic brain damage, tracheostomy, PEG tube, chronic respiratory failure, pulmonary embolism, recurrent UTI, and staghorn renal calculus.  Patient was transferred per EMS from [facility] with complaints of a clogged PEG tube and displaced tracheostomy.  Upon assessment at [facility] emergency department the patient presented in a septic state with fever of 101.7, tachycardia at 121, WBC of 18.8, with obvious source of infection, as urinalysis revealed turbid urine with hematuria and nitrates, leukocytes, and 4+ bacteria.  Chronic Mojica catheter managed per outpatient urology at [facility], most recent appointment was 6/27/2024 where she was evaluated post nephrostomy tube being removed, where their recommendations were to observe rather than replace tube at that time.  ED workup revealed tracheostomy was not dislodged however it was extremely clogged with debris and dried secretions, when that was cleaned and replaced patient was able to utilize properly.  Additionally PEG tube was not dislodged, after cleaning debris and evaluating PEG tube it was in proper alignment.  CT scan revealed thickening in handedness on the mucosal urothelium of the collecting system bilaterally may represent acute or subacute inflammatory process/pyeloureteritis, no evidence of acute.  Stable nonobstructive bilateral staghorn calculi, and significant large volume stool in the distal colon with moderate gas distention of proximal colon.  Mildly dilated fluid filled small bowel loops may represent ileus or acute nonspecific enteritis.  No evidence of small bowel obstruction.  ED medications 1 L normal saline bolus,  650 mg Tylenol suppository, and 1 g of Rocephin.  Previous urine culture  was positive for ESBL and Proteus Mirabilis.  Susceptible to ceftriaxone, 2 g continued.    Today: Patient appears to be resting comfortably in bed, on trach collar at 9 L.  She continues to be able to follow me slightly with her eyes however she remains nonverbal.  Palliative care had a discussion with the patient's parents yesterday, her  passed away over the last 3 days and they are trying to get through his visitation and  today.  They appear to be more open then the patient's  to palliative discussion and possible back to SNF with hospice.  They are hoping to come back this afternoon to have further discussion with BERT Lee with palliative care.  Patient had several liquid BMs last night and today, abdomen remains soft and nondistended.  Hypoactive bowel sounds.  Will repeat KUB in the a.m. to reevaluate ileus.  Blood culture reveals gram-positive cocci, awaiting full culture ID.  Urine culture gram-negative bacilli, awaiting full culture available 717 for evaluation of antibiotics.  Patient has continued to have intermittent bouts of sinus tachycardia, crease metoprolol to 5 mg every 6 hours due to stabilized blood pressure.  Patient's temperature remains 99, no afebrile episodes.  Patient has had less obvious grimacing episodes with Toradol initiation yesterday.  Will continue to hold full palliative care pain management until full discussion with the patient's family.        Review of Systems   Constitutional:  Positive for diaphoresis and fever.   HENT:  Positive for congestion, drooling, rhinorrhea and trouble swallowing.    Eyes:  Positive for discharge. Negative for redness.   Respiratory:  Positive for cough and wheezing.    Cardiovascular:         Sinus tachycardia   Gastrointestinal:  Positive for constipation. Negative for abdominal distention, diarrhea and vomiting.   Endocrine: Negative  for heat intolerance.   Genitourinary:         Chronic Mojica catheter, recent removal of nephrostomy tube   Musculoskeletal:         Patient is bedbound with chronic contractures of all extremities.   Skin:  Positive for pallor and wound.        Healing coccyx wound   Neurological:  Positive for facial asymmetry, speech difficulty and weakness.   Hematological:  Does not bruise/bleed easily.   Psychiatric/Behavioral:  Positive for confusion.       All pertinent negatives and positives are as above. All other systems have been reviewed and are negative unless otherwise stated.     Objective    Temp:  [98.4 °F (36.9 °C)-100.2 °F (37.9 °C)] 99.5 °F (37.5 °C)  Heart Rate:  [] 108  Resp:  [16-18] 16  BP: (112-154)/(64-90) 152/84  Physical Exam  Constitutional:       Appearance: She is cachectic. She is ill-appearing and diaphoretic.   HENT:      Head: Normocephalic.      Nose: Congestion and rhinorrhea present.      Mouth/Throat:      Mouth: Mucous membranes are moist.      Pharynx: Oropharynx is clear.   Eyes:      Pupils: Pupils are equal, round, and reactive to light.   Neck:      Comments: Chronic contractures of neck  Cardiovascular:      Rate and Rhythm: Regular rhythm. Tachycardia present.      Pulses: Normal pulses.      Comments: S/ST , up to 144  Pulmonary:      Breath sounds: Wheezing and rhonchi present.      Comments: Trach collar at 9 L  Abdominal:      General: Bowel sounds are normal. There is no distension.      Palpations: Abdomen is soft.      Tenderness: There is no abdominal tenderness.   Genitourinary:     Comments: Voiding per chronic Mojica catheter  Musculoskeletal:      Cervical back: Rigidity present.      Right lower leg: No edema.      Left lower leg: No edema.      Comments: Bedbound with chronic fractures of all extremities   Skin:     General: Skin is warm and moist.      Capillary Refill: Capillary refill takes less than 2 seconds.      Coloration: Skin is pale.   Neurological:       Mental Status: She is alert. She is disoriented.      Motor: Weakness, atrophy and abnormal muscle tone present.   Psychiatric:         Attention and Perception: She is inattentive.         Mood and Affect: Affect is flat.         Behavior: Behavior is slowed and withdrawn.         Cognition and Memory: Cognition is impaired.      Comments: Nonverbal at baseline       Results Review:  I have reviewed the labs, radiology results, and diagnostic studies.    Laboratory Data:   Results from last 7 days   Lab Units 07/16/24  0654 07/15/24  0600 07/14/24  2231   WBC 10*3/mm3 13.60* 17.84* 18.80*   HEMOGLOBIN g/dL 10.1* 10.3* 11.4*   HEMATOCRIT % 32.4* 32.7* 36.5   PLATELETS 10*3/mm3 245 207 274        Results from last 7 days   Lab Units 07/16/24  0654 07/15/24  0600 07/14/24  2231   SODIUM mmol/L 141 141 141   POTASSIUM mmol/L 4.3 3.5 4.5   CHLORIDE mmol/L 107 110* 104   CO2 mmol/L 22.0 23.0 26.0   BUN mg/dL 20 23* 29*   CREATININE mg/dL 0.34* 0.40* 0.46*   CALCIUM mg/dL 9.5 8.8 10.1   BILIRUBIN mg/dL  --   --  0.7   ALK PHOS U/L  --   --  84   ALT (SGPT) U/L  --   --  14   AST (SGOT) U/L  --   --  14   GLUCOSE mg/dL 109* 113* 141*       Culture Data:   Urine Culture   Date Value Ref Range Status   07/14/2024 Culture in progress  Preliminary       Radiology Data:   Imaging Results (Last 24 Hours)       ** No results found for the last 24 hours. **            I have reviewed the patient's current medications.     baclofen, 10 mg, Per G Tube, TID  bisacodyl, 10 mg, Rectal, Daily  cefTRIAXone, 2,000 mg, Intravenous, Q24H  cetirizine, 10 mg, Per G Tube, Daily  chlorhexidine, 15 mL, Mouth/Throat, BID  enoxaparin, 40 mg, Subcutaneous, Daily  fluticasone, 2 spray, Nasal, QAM  guaifenesin, 600 mg, Per G Tube, BID  metoprolol tartrate, 5 mg, Intravenous, Q6H  nystatin, 1 Application, Topical, BID  polyethylene glycol, 17 g, Per G Tube, Daily  rosuvastatin, 5 mg, Per G Tube, Nightly  Scopolamine, 1 patch, Transdermal,  Q72H  sennosides-docusate, 2 tablet, Oral, BID  simethicone, 20 mg, Per G Tube, Q6H  sodium chloride, 10 mL, Intravenous, Q12H         Assessment/Plan   Assessment  Active Hospital Problems    Diagnosis     **Sepsis secondary to UTI     Presence of externally removable percutaneous endoscopic gastrostomy (PEG) tube     History of anoxic brain injury     Functional quadriplegia     Tracheostomy present     Ileus        Treatment Plan  1.  Sepsis secondary to UTI-2024 septic state with fever of 101.7, tachycardia at 121, WBC of 18.8, with obvious source of infection, as urinalysis revealed turbid urine with hematuria and nitrates, leukocytes, and 4+ bacteria.  Patient has had multiple UTIs over the last several months last of which was 2024 with ESBL and Proteus  Mirabilis, which was susceptible to ceftriaxone.  Continue 2 g ceftriaxone until cultures are available.  I 1 blood cultures revealed gram-positive cocci, awaiting ID.  Patient's accurate med reconciliation was performed today, restarted home Lopressor 50 mg every 12 per G-tube, for recurrent sinus tachycardia.    2.  History of anoxic brain injury/functional quadriplegia-according to nursing facility patient is at her baseline, they transferred her due to possible trach dislodgment.  Patient appears at her baseline, she continues to be nonverbal, will follow commands with her eyes however is unable to move any extremities.  Palliative care discussion with the patient's parents yesterday, given her 's recent death, approximately 2 to 3 days ago.  They are attempting to have his visitation and  and will be back this evening 2024 to reevaluate possible palliative care versus hospice.      3.  Tracheostomy present-patient was brought to the ED due to possible trach dislodgment.  After MD and [facility] ED evaluated, it was caked in debris and old dried accretions.  Once that was cleaned and reassess tracheostomy was in place and patient  resumed home tracheostomy immunity.  Currently patient is stable on 9 L tracheostomy collar, oxygen saturation is 94% at present.  Continue tracheostomy care protocol, ABGs as needed, pulmonary hygiene and suctioning as needed.    4.  Ileus-patient CT scan on admission revealed possible ileus or acute nonspecific enteritis.  Follow-up KUB revealed an. adynamic ileus.  Dulcolax suppository and milk and molasses enema administered this a.m. with some results.  Continue to hold advancement of dosage of tube feeding for 24 hours to reevaluate, after KUB in a.m.    5.  Presence of externally removable percutaneous endoscopic gastrostomy tube-patient was transferred per SNF with complaints of possible dislodgment of PEG tube.  As previously stated her PEG tube was covered in debris and old secretions and appeared to be dislodged, however once PEG tube was cleaned reevaluated per CT and KUB.  It is currently in its proper place, will continue PEG tube care per protocol.  Patient has been evaluated by dietitian and tolerated resumption of  tube feed at 20 mL/h with no advancement due to possible ileus.  Reevaluate for advancement after KUB in a.m.    VTE prophylaxis Lovenox 40 mg.  Labs in a.m.    Medical Decision Making  Number and Complexity of problems: 5  Differential Diagnosis: None    Conditions and Status        Condition is unchanged.     [facility] Data  External documents reviewed:   CODE STATUS-DNR/DNI per previous documentation  Cardiac tracing (EKG, telemetry) interpretation: Overnight telemetry S/ST  .  EKG interpretation sinus tachycardia, ventricular rate 111, atrial rate 111, QTc interval 435 MS  radiology interpretation:   Labs reviewed:   7/14/2024 continue to review blood cultures and urine cultures pending  7/16/2024 BMP and CBC reviewed, repeat in a.m.  Any tests that were considered but not ordered: None     Decision rules/scores evaluated (example [id number], [place], etc): None  Discussed with:   Nneka Charles APRN, palliative care and patient     Care Planning  Shared decision making: Nneka Sidhu APRN palliative care and patient   code status and discussions: DNR/DNI per documentation with patient upon admission.  Surrogate Decision Maker her mother Marquita    Disposition  Social Determinants of Health that impact treatment or disposition: Patient is a resident of [facility], will will need to return at discharge.  I expect the patient to be discharged to SNF in unknown days.     Electronically signed by BERT Asif, 07/16/24, 14:11 CDT.

## 2024-07-16 NOTE — PLAN OF CARE
Problem: Adult Inpatient Plan of Care  Goal: Plan of Care Review  Outcome: Ongoing, Progressing  Flowsheets (Taken 7/16/2024 0774)  Progress: no change  Plan of Care Reviewed With: patient  Outcome Evaluation: VSS. Responds to stimuli, nonverbal. Preventative dressing on coccyx and heels. NPO, tube feeds per PEG. Accuchek. PEG tube rate at 20/hr and flush at 45/hr. Mojica cath in place, cath care complete. Incontinent of bowel. IV R hand with NS at 100. Scop patch to L ear. Tele running NSR to Sinus tach. Lovenox for VTE prevention. Turn q2h. Trach care complete, suction x1. Call light within reach, safety maintained.

## 2024-07-16 NOTE — PLAN OF CARE
Goal Outcome Evaluation: PT has received follow up orders.  Per chart and staff pt is chronically bed bound, non verbal, and dependent for all needs.  Pt is reportedly at her baseline.  Nsg to provide care to include skin protection, bed mobility and pressure relief to include ROM of extremities as able w/ noted hx of chronic contractures.  PT to sign off at this time.  Thank you for the referral.

## 2024-07-16 NOTE — CASE MANAGEMENT/SOCIAL WORK
Continued Stay Note  [facility]     Patient Name: Namita Zabala  MRN: [id number]  Today's Date: 7/16/2024    Admit Date: 7/14/2024    Plan: SNF   Discharge Plan       Row Name 07/16/24 0946       Plan    Plan SNF    Patient/Family in Agreement with Plan yes    Plan Comments [facility] does not have any open beds at present time.  [facility] not able to offer a bed.  Connie from [facility] saying they have no beds and could not offer as they could not meet her needs there.  [facility] is reviewing and will call back decision, spoke with Lisa [phone].  [facility] will not be able to offer a bed per Rosalia there. Pt may have no choice but to return to [facility]. Will follow.                   Discharge Codes    No documentation.                       CAMILA Reza

## 2024-07-16 NOTE — PLAN OF CARE
Problem: Adult Inpatient Plan of Care  Goal: Plan of Care Review  Outcome: Ongoing, Progressing  Flowsheets (Taken 7/16/2024 2531)  Outcome Evaluation: Patient 8L Trach collar, humidified. Preventative drsg on coccyx and ry heels. NPO, Tube feedings infusing per order. IVF infusing per order. Tylenol for temp. Meds given crushed in G-Tube. Turn q2. HR BERT anderson notified, see new orders, will continue to monitor. SHANNAN Mojica. Safety maintained.

## 2024-07-17 ENCOUNTER — APPOINTMENT (OUTPATIENT)
Dept: GENERAL RADIOLOGY | Facility: HOSPITAL | Age: 49
End: 2024-07-17
Payer: MEDICARE

## 2024-07-17 PROBLEM — K56.7 ILEUS: Status: RESOLVED | Noted: 2021-08-28 | Resolved: 2024-07-17

## 2024-07-17 PROBLEM — K56.609 SMALL BOWEL OBSTRUCTION: Status: ACTIVE | Noted: 2024-07-17

## 2024-07-17 LAB
ANION GAP SERPL CALCULATED.3IONS-SCNC: 12 MMOL/L (ref 5–15)
BACTERIA SPEC AEROBE CULT: ABNORMAL
BACTERIA SPEC AEROBE CULT: ABNORMAL
BASOPHILS # BLD AUTO: 0.06 10*3/MM3 (ref 0–0.2)
BASOPHILS NFR BLD AUTO: 0.6 % (ref 0–1.5)
BUN SERPL-MCNC: 13 MG/DL (ref 6–20)
BUN/CREAT SERPL: 40.6 (ref 7–25)
CALCIUM SPEC-SCNC: 9 MG/DL (ref 8.6–10.5)
CHLORIDE SERPL-SCNC: 107 MMOL/L (ref 98–107)
CO2 SERPL-SCNC: 21 MMOL/L (ref 22–29)
CREAT SERPL-MCNC: 0.32 MG/DL (ref 0.57–1)
DEPRECATED RDW RBC AUTO: 42.6 FL (ref 37–54)
EGFRCR SERPLBLD CKD-EPI 2021: 129.8 ML/MIN/1.73
EOSINOPHIL # BLD AUTO: 0.36 10*3/MM3 (ref 0–0.4)
EOSINOPHIL NFR BLD AUTO: 3.4 % (ref 0.3–6.2)
ERYTHROCYTE [DISTWIDTH] IN BLOOD BY AUTOMATED COUNT: 12.8 % (ref 12.3–15.4)
GLUCOSE BLDC GLUCOMTR-MCNC: 110 MG/DL (ref 70–130)
GLUCOSE BLDC GLUCOMTR-MCNC: 114 MG/DL (ref 70–130)
GLUCOSE BLDC GLUCOMTR-MCNC: 91 MG/DL (ref 70–130)
GLUCOSE BLDC GLUCOMTR-MCNC: 95 MG/DL (ref 70–130)
GLUCOSE SERPL-MCNC: 106 MG/DL (ref 65–99)
GRAM STN SPEC: ABNORMAL
HCT VFR BLD AUTO: 34 % (ref 34–46.6)
HGB BLD-MCNC: 10.7 G/DL (ref 12–15.9)
IMM GRANULOCYTES # BLD AUTO: 0.08 10*3/MM3 (ref 0–0.05)
IMM GRANULOCYTES NFR BLD AUTO: 0.7 % (ref 0–0.5)
ISOLATED FROM: ABNORMAL
LYMPHOCYTES # BLD AUTO: 1.32 10*3/MM3 (ref 0.7–3.1)
LYMPHOCYTES NFR BLD AUTO: 12.3 % (ref 19.6–45.3)
MCH RBC QN AUTO: 28.8 PG (ref 26.6–33)
MCHC RBC AUTO-ENTMCNC: 31.5 G/DL (ref 31.5–35.7)
MCV RBC AUTO: 91.4 FL (ref 79–97)
MONOCYTES # BLD AUTO: 0.51 10*3/MM3 (ref 0.1–0.9)
MONOCYTES NFR BLD AUTO: 4.7 % (ref 5–12)
NEUTROPHILS NFR BLD AUTO: 78.3 % (ref 42.7–76)
NEUTROPHILS NFR BLD AUTO: 8.41 10*3/MM3 (ref 1.7–7)
NRBC BLD AUTO-RTO: 0 /100 WBC (ref 0–0.2)
PLATELET # BLD AUTO: 287 10*3/MM3 (ref 140–450)
PMV BLD AUTO: 10.7 FL (ref 6–12)
POTASSIUM SERPL-SCNC: 4.1 MMOL/L (ref 3.5–5.2)
QT INTERVAL: 320 MS
QTC INTERVAL: 435 MS
RBC # BLD AUTO: 3.72 10*6/MM3 (ref 3.77–5.28)
SODIUM SERPL-SCNC: 140 MMOL/L (ref 136–145)
WBC NRBC COR # BLD AUTO: 10.74 10*3/MM3 (ref 3.4–10.8)

## 2024-07-17 PROCEDURE — 25010000002 ENOXAPARIN PER 10 MG: Performed by: FAMILY MEDICINE

## 2024-07-17 PROCEDURE — 94761 N-INVAS EAR/PLS OXIMETRY MLT: CPT

## 2024-07-17 PROCEDURE — 85025 COMPLETE CBC W/AUTO DIFF WBC: CPT

## 2024-07-17 PROCEDURE — 82948 REAGENT STRIP/BLOOD GLUCOSE: CPT

## 2024-07-17 PROCEDURE — 25810000003 LACTATED RINGERS PER 1000 ML

## 2024-07-17 PROCEDURE — 94799 UNLISTED PULMONARY SVC/PX: CPT

## 2024-07-17 PROCEDURE — 74018 RADEX ABDOMEN 1 VIEW: CPT

## 2024-07-17 PROCEDURE — 99232 SBSQ HOSP IP/OBS MODERATE 35: CPT

## 2024-07-17 PROCEDURE — 25010000002 KETOROLAC TROMETHAMINE PER 15 MG

## 2024-07-17 PROCEDURE — 99497 ADVNCD CARE PLAN 30 MIN: CPT

## 2024-07-17 PROCEDURE — 25010000002 CEFTRIAXONE PER 250 MG

## 2024-07-17 PROCEDURE — 25810000003 SODIUM CHLORIDE 0.9 % SOLUTION: Performed by: FAMILY MEDICINE

## 2024-07-17 PROCEDURE — 80048 BASIC METABOLIC PNL TOTAL CA: CPT

## 2024-07-17 RX ORDER — METOPROLOL TARTRATE 1 MG/ML
5 INJECTION, SOLUTION INTRAVENOUS EVERY 6 HOURS
Status: DISCONTINUED | OUTPATIENT
Start: 2024-07-17 | End: 2024-07-18

## 2024-07-17 RX ORDER — KETOROLAC TROMETHAMINE 15 MG/ML
15 INJECTION, SOLUTION INTRAMUSCULAR; INTRAVENOUS EVERY 6 HOURS
Qty: 2 ML | Refills: 0 | Status: COMPLETED | OUTPATIENT
Start: 2024-07-17 | End: 2024-07-18

## 2024-07-17 RX ORDER — SODIUM CHLORIDE, SODIUM LACTATE, POTASSIUM CHLORIDE, CALCIUM CHLORIDE 600; 310; 30; 20 MG/100ML; MG/100ML; MG/100ML; MG/100ML
75 INJECTION, SOLUTION INTRAVENOUS CONTINUOUS
Status: DISCONTINUED | OUTPATIENT
Start: 2024-07-17 | End: 2024-07-18

## 2024-07-17 RX ORDER — MORPHINE SULFATE 2 MG/ML
1 INJECTION, SOLUTION INTRAMUSCULAR; INTRAVENOUS EVERY 4 HOURS PRN
Status: DISCONTINUED | OUTPATIENT
Start: 2024-07-17 | End: 2024-07-22 | Stop reason: HOSPADM

## 2024-07-17 RX ORDER — METOPROLOL TARTRATE 1 MG/ML
2.5 INJECTION, SOLUTION INTRAVENOUS ONCE
Status: COMPLETED | OUTPATIENT
Start: 2024-07-17 | End: 2024-07-17

## 2024-07-17 RX ADMIN — NYSTATIN 1 APPLICATION: 100000 POWDER TOPICAL at 09:07

## 2024-07-17 RX ADMIN — Medication 10 ML: at 09:01

## 2024-07-17 RX ADMIN — FLUTICASONE PROPIONATE 2 SPRAY: 50 SPRAY, METERED NASAL at 05:05

## 2024-07-17 RX ADMIN — SODIUM CHLORIDE, POTASSIUM CHLORIDE, SODIUM LACTATE AND CALCIUM CHLORIDE 75 ML/HR: 600; 310; 30; 20 INJECTION, SOLUTION INTRAVENOUS at 21:39

## 2024-07-17 RX ADMIN — Medication 10 ML: at 20:22

## 2024-07-17 RX ADMIN — METOPROLOL TARTRATE 5 MG: 5 INJECTION INTRAVENOUS at 14:37

## 2024-07-17 RX ADMIN — TIZANIDINE 4 MG: 4 TABLET ORAL at 05:05

## 2024-07-17 RX ADMIN — ACETAMINOPHEN 650 MG: 650 SUPPOSITORY RECTAL at 16:30

## 2024-07-17 RX ADMIN — HYDROCODONE BITARTRATE AND ACETAMINOPHEN 1 TABLET: 7.5; 325 TABLET ORAL at 05:13

## 2024-07-17 RX ADMIN — KETOROLAC TROMETHAMINE 15 MG: 15 INJECTION, SOLUTION INTRAMUSCULAR; INTRAVENOUS at 20:21

## 2024-07-17 RX ADMIN — NYSTATIN 1 APPLICATION: 100000 POWDER TOPICAL at 20:22

## 2024-07-17 RX ADMIN — METOPROLOL TARTRATE 5 MG: 5 INJECTION INTRAVENOUS at 20:21

## 2024-07-17 RX ADMIN — ENOXAPARIN SODIUM 40 MG: 100 INJECTION SUBCUTANEOUS at 08:56

## 2024-07-17 RX ADMIN — SODIUM CHLORIDE, POTASSIUM CHLORIDE, SODIUM LACTATE AND CALCIUM CHLORIDE 75 ML/HR: 600; 310; 30; 20 INJECTION, SOLUTION INTRAVENOUS at 08:56

## 2024-07-17 RX ADMIN — CEFTRIAXONE SODIUM 2000 MG: 2 INJECTION, POWDER, FOR SOLUTION INTRAMUSCULAR; INTRAVENOUS at 20:22

## 2024-07-17 RX ADMIN — CHLORHEXIDINE GLUCONATE 0.12% ORAL RINSE 15 ML: 1.2 LIQUID ORAL at 20:24

## 2024-07-17 RX ADMIN — Medication 20 MG: at 02:54

## 2024-07-17 RX ADMIN — METOPROLOL TARTRATE 5 MG: 5 INJECTION INTRAVENOUS at 09:06

## 2024-07-17 RX ADMIN — METOPROLOL TARTRATE 2.5 MG: 5 INJECTION INTRAVENOUS at 18:29

## 2024-07-17 RX ADMIN — CHLORHEXIDINE GLUCONATE 0.12% ORAL RINSE 15 ML: 1.2 LIQUID ORAL at 09:07

## 2024-07-17 RX ADMIN — SODIUM CHLORIDE 100 ML/HR: 9 INJECTION, SOLUTION INTRAVENOUS at 05:04

## 2024-07-17 NOTE — PLAN OF CARE
Problem: Adult Inpatient Plan of Care  Goal: Plan of Care Review  Outcome: Ongoing, Progressing  Flowsheets (Taken 7/17/2024 5657)  Progress: no change  Plan of Care Reviewed With: patient  Outcome Evaluation: VSS. Responds to stimuli, nonverbal. NPO, tube feeding at 20/hr and flush at 45/hr. Meds crushed and given per PEG tube. Mojica cath care complete. IV R hand with NS at 100.IV abx. Trach care. Turn q2h. 8L humidified. Tele running NSR to sinus tach. Lovenox for VTE. Accuchek. Preventatives on coccyx and heels. Call light within reach, safety maintained, will continue to monitor.

## 2024-07-17 NOTE — CASE MANAGEMENT/SOCIAL WORK
Continued Stay Note   [facility]     Patient Name: Namita Zabala  MRN: [id number]  Today's Date: 7/17/2024    Admit Date: 7/14/2024    Plan: SNF   Discharge Plan       Row Name 07/17/24 1426       Plan    Plan SNF    Plan Comments [facility] is considering pt but no final answer yet. Will follow for decision.                   Discharge Codes    No documentation.                       ORAL Mcintyre

## 2024-07-17 NOTE — PROGRESS NOTES
[facility] Palliative Care Services  Progress Note  Patient Name: Namita Zabala  Date of Admission: 2024  Today's Date: 24     Code Status and Medical Interventions:   Ordered at: 07/15/24 0243     Medical Intervention Limits:    No intubation (DNI)    No cardioversion     Code Status (Patient has no pulse and is not breathing):    No CPR (Do Not Attempt to Resuscitate)     Medical Interventions (Patient has pulse or is breathing):    Limited Support     Subjective   Chief complaint/Reason for Referral/Visit: Follow up on Goals of Care/Advance Care Planning.    Medical record reviewed. Events noted.  Labs reviewed.  KUB completed this morning reveals moderate progressive ileus and small bowel obstruction cannot be excluded.  Tube feeds have been stopped.  She is lying in bed with her eyes open and in no apparent distress.  Remains unable to follow commands.  No visitors at bedside.  Discussed with nursing.    Advance Care Planning     Advanced Directives: No advance directive on file.     Advance Care Discussion: Ms. Zabala remains unable to demonstrate ability to make complex medical decisions.  Spoke with her father, Noel, via telephone.  Provided brief update including findings of progressive ileus.  He shared he has been in the process of working on guardianship further this morning.  Reports he and his wife plan to come visit this afternoon and would be open to having further discussion.  Will plan to meet with them once they arrive.  Updated nursing.     The patient receives support from her parents. Patient's parents are her next of kin.  Goals of care: Ongoing.    Review of Systems   Unable to perform ROS: Patient nonverbal     Pain Assessment  CPOT and PAINAD Scales: PAINAD (Pain Assessment in Advance Dementia Scale)  PAINAD Breathin-->normal  PAINAD Negative Vocalization: 0-->none  PAINAD Facial Expression: 2-->facial grimacing  PAINAD Body Language: 1-->tense,  distressed pacing, fidgeting  PAINAD Consolability: 1-->distracted or reassured by voice/touch  PAINAD Score: 4  Objective   Diagnostics: Reviewed      Intake/Output Summary (Last 24 hours) at 7/17/2024 1032  Last data filed at 7/17/2024 0931  Gross per 24 hour   Intake 5253 ml   Output 2475 ml   Net 2778 ml     Current Facility-Administered Medications   Medication Dose Route Frequency Provider Last Rate Last Admin    acetaminophen (TYLENOL) tablet 650 mg  650 mg Per G Tube Q4H PRN David Hernández MD   650 mg at 07/16/24 1602    Or    acetaminophen (TYLENOL) 160 MG/5ML oral solution 650 mg  650 mg Per G Tube Q4H PRN David Hernández MD        Or    acetaminophen (TYLENOL) suppository 650 mg  650 mg Rectal Q4H PRN David Hernández MD   650 mg at 07/15/24 0522    artificial tears ophthalmic ointment   Both Eyes Q1H PRN Montserrat Feliciano APRN        [Held by provider] baclofen (LIORESAL) tablet 40 mg  40 mg Per G Tube TID Montserrat Feliciano APRN   40 mg at 07/16/24 2122    bisacodyl (DULCOLAX) suppository 10 mg  10 mg Rectal Q48H PRN David Hernández MD        bisacodyl (DULCOLAX) suppository 10 mg  10 mg Rectal Daily Montserrat Feliciano APRN   10 mg at 07/15/24 1116    cefTRIAXone (ROCEPHIN) 2,000 mg in sodium chloride 0.9 % 100 mL MBP  2,000 mg Intravenous Q24H David Hernández  mL/hr at 07/16/24 2122 2,000 mg at 07/16/24 2122    [Held by provider] cetirizine (zyrTEC) tablet 10 mg  10 mg Per G Tube Daily David Hernández MD   10 mg at 07/16/24 0819    chlorhexidine (PERIDEX) 0.12 % solution 15 mL  15 mL Mouth/Throat BID Montserrat Feliciano APRN   15 mL at 07/17/24 0907    [Held by provider] docusate (COLACE) 50 MG/5ML liquid 150 mg  150 mg Per G Tube BID Montserrat Feliciano APRN   150 mg at 07/16/24 2122    Enoxaparin Sodium (LOVENOX) syringe 40 mg  40 mg Subcutaneous Daily David Hernández MD   40 mg at 07/17/24 0856    [Held by provider] Ferrous Sulfate 300  (60 Fe) MG/5ML solution 300 mg  300 mg Per G Tube BID Montserrat Feliciano APRN   300 mg at 07/16/24 2122    fleet enema 1 enema  1 enema Rectal Q48H PRN David Hernández MD        fluticasone (FLONASE) 50 MCG/ACT nasal spray 2 spray  2 spray Nasal QAM Montserrat Feliciano APRN   2 spray at 07/17/24 0505    [Held by provider] guaifenesin (ROBITUSSIN) 100 MG/5ML liquid 600 mg  600 mg Per G Tube BID David Hernández MD   600 mg at 07/16/24 2122    HYDROcodone-acetaminophen (NORCO) 7.5-325 MG per tablet 1 tablet  1 tablet Oral Q4H PRN Tae Charles MD   1 tablet at 07/17/24 0513    ipratropium-albuterol (DUO-NEB) nebulizer solution 3 mL  3 mL Nebulization Q4H PRN David Hernández MD        lactated ringers infusion  75 mL/hr Intravenous Continuous Montserrat Feliciano APRN 75 mL/hr at 07/17/24 0856 75 mL/hr at 07/17/24 0856    metoprolol tartrate (LOPRESSOR) injection 5 mg  5 mg Intravenous Q6H Montserrat Feliciano APRN   5 mg at 07/17/24 0906    [Held by provider] metoprolol tartrate (LOPRESSOR) tablet 50 mg  50 mg Per G Tube BID Montserrat Feliciano APRN   50 mg at 07/16/24 2122    nystatin (MYCOSTATIN) powder 1 Application  1 Application Topical BID Montserrat Feliciano APRN   1 Application at 07/17/24 0907    ondansetron (ZOFRAN) 4 MG/5ML oral solution 4 mg  4 mg Oral Q6H PRN Montserrat Feliciano APRN        ondansetron (ZOFRAN) injection 4 mg  4 mg Intravenous Q6H PRN David Hernández MD        [Held by provider] pantoprazole (PROTONIX) EC tablet 40 mg  40 mg Oral BID Montserrat Feliciano APRN   40 mg at 07/16/24 2122    [Held by provider] polyethylene glycol (MIRALAX) packet 17 g  17 g Per G Tube Daily David Hernández MD   17 g at 07/16/24 0819    potassium chloride (KAYCIEL) 20 mEq/15 mL solution 20 mEq  20 mEq Per G Tube BID Montserrat APRN   20 mEq at 07/16/24 2122    [Held by provider] rosuvastatin (CRESTOR) tablet 5 mg  5 mg Per G Tube Nightly David Hernández MD   5 mg  at 07/16/24 2122    [Held by provider] saccharomyces boulardii (FLORASTOR) capsule 250 mg  250 mg Per G Tube Daily Montserrat Feliciano APRN   250 mg at 07/16/24 1602    scopolamine patch 1 mg/72 hr  1 patch Transdermal Q72H Montserrat Feliciano APRN   1 patch at 07/15/24 1459    [Held by provider] sennosides-docusate (PERICOLACE) 8.6-50 MG per tablet 2 tablet  2 tablet Oral BID Montserrat Feliciano APRN   2 tablet at 07/16/24 0819    [Held by provider] simethicone (MYLICON) 40 MG/0.6ML drops 20 mg  20 mg Per G Tube Q6H Montserrat Feliciano APRN   20 mg at 07/17/24 0254    sodium chloride 0.9 % flush 10 mL  10 mL Intravenous Q12H David Hernández MD   10 mL at 07/17/24 0901    sodium chloride 0.9 % flush 10 mL  10 mL Intravenous PRN David Hernández MD        sodium chloride 0.9 % infusion 40 mL  40 mL Intravenous PRN David Hernández MD        sodium chloride 0.9 % infusion  100 mL/hr Intravenous Continuous David Hernández  mL/hr at 07/17/24 0504 100 mL/hr at 07/17/24 0504    sodium chloride nasal spray 2 spray  2 spray Each Nare PRN Montserrat Feliciano APRN        [Held by provider] thiamine (VITAMIN B-1) tablet 100 mg  100 mg Per G Tube Daily Montserrat Feliciano APRN   100 mg at 07/16/24 1602    [Held by provider] tiZANidine (ZANAFLEX) tablet 4 mg  4 mg Per G Tube Q8H Montserrat Feliciano APRN   4 mg at 07/17/24 0505    zinc oxide 20 % ointment   Topical Q4H PRN Montserrat Feliciano APRN         lactated ringers, 75 mL/hr, Last Rate: 75 mL/hr (07/17/24 0856)  sodium chloride, 100 mL/hr, Last Rate: 100 mL/hr (07/17/24 0504)        acetaminophen **OR** acetaminophen **OR** acetaminophen    artificial tears    bisacodyl    fleet enema    HYDROcodone-acetaminophen    ipratropium-albuterol    ondansetron    ondansetron    sodium chloride    sodium chloride    sodium chloride    zinc oxide  Current medications patient is presently taking including all prescriptions, over-the-counter, herbals and  "vitamin/mineral/dietary (nutritional) supplements with reviewed including route, type, dose and frequency and are current per MAR at time of dictation.    Assessment:  Vital Signs: /69 (BP Location: Right leg, Patient Position: Lying)   Pulse 72   Temp 99.2 °F (37.3 °C) (Oral)   Resp 16   Ht 160 cm (63\")   Wt 59.4 kg (131 lb)   LMP  (LMP Unknown)   SpO2 96%   BMI 23.21 kg/m²     Physical Exam  Vitals and nursing note reviewed.   Constitutional:       General: She is awake. She is not in acute distress.     Appearance: She is ill-appearing.   HENT:      Head: Normocephalic.   Eyes:      General: Lids are normal.   Neck:      Trachea: Tracheostomy and abnormal tracheal secretions present.   Cardiovascular:      Rate and Rhythm: Normal rate.   Pulmonary:      Comments: 8 L oxygen via trach collar.   Abdominal:      Palpations: Abdomen is soft.      Comments: PEG tube present.  Tube feeds on hold.    Musculoskeletal:      Comments: Contractures of all extremities.    Skin:     General: Skin is warm and dry.   Psychiatric:         Speech: She is noncommunicative.     Functional status: Palliative Performance Scale Score: Performance 10% based on the following measures: Ambulation: Totally bed bound, Activity and Evidence of Disease: Unable to do any work, extensive evidence of disease, Self-Care: Total care required,  Intake: Mouth care only, LOC: Drowsy or comatose.  Nutritional status: Albumin 3.7. Body mass index is 23.21 kg/m².   Patient status: Disease state: Controlled with current treatments.    Active Hospital Problems    Diagnosis     **Sepsis secondary to UTI     Presence of externally removable percutaneous endoscopic gastrostomy (PEG) tube     History of anoxic brain injury     Functional quadriplegia     Tracheostomy present     Ileus        Impression/Problem List:  Sepsis secondary to urinary tract infection  History of anoxic brain injury  Ileus  Functional quadriplegia / Contractures / " Bedbound / Impaired mobility and ADLs  Tracheostomy present  Kidney stones  Neurogenic bladder with chronic indwelling stratton catheter   Presence of PEG tube    Plan / Recommendations     Palliative Care Encounter   Goals of care include CODE STATUS NO CPR with limited support interventions.    Prognosis is poor long-term secondary to sepsis due to UTI, anoxic brain injury, functional quadriplegia, presence of tracheostomy and PEG tube, ileus and other comorbidities listed above.      Ms. Zabala's parents, Noel and Marquita, are her next of kin as her spouse recently passed away and she has no children.       Ms. Zabala remains unable to demonstrate ability to make complex medical decisions.  Spoke with her father, Noel, via telephone.       Reports he and his wife plan to come visit this afternoon and would be open to having further discussion.  Will plan to meet with them once they arrive.       ADDENDUM:  Met with Ms. Zabala's father, Noel, this afternoon at bedside.  Marquita unfortunately unable to be here today due to her own health issues and resting from recent events.  We discussed treatment options further.  Explained concerns with findings of ileus and potential small bowel obstruction.  We discussed potential risks associated with this.  Noel reflected further on all of the medical issues his daughter has endured over the last 3 years including multiple complex hospitalizations, ED visits and nursing facility placements.  He shared she has progressively declined over the last year.  He shared he is still hopeful for a miracle and prays daily however also appears realistic.  Discussed concerns with her overall decline placing her at high risk for continued frequent hospitalization.  He shared he feels that is to continuing cycle.  Explained that this will likely continue for the rest of her life given complex comorbidities.  He demonstrated understanding.  We discussed treatment options further  "including continuing current measures versus transition to more comfort focused care including hospice services.  Hospice discussed at length including details and expectations.  Explained goal was to focus on symptom management and end-of-life care rather than life-prolonging interventions and ongoing workup.  Discussed cares focused on treating symptoms rather than labs, monitors, etc and he demonstrated understanding.  He shared it is difficult to watch or go through hospitalizations as he stated \"nothing ever changes\" referring to her cognitive/physical state.  He questioned whether she could be placed at another SNF if they wish to proceed with hospice.  Have discussed with SW and looking into alternative options.  We also discussed option of placing hospice consult and discussing further with hospice liaison.  He requested to speak with his wife further tonight and tomorrow to determine her thoughts before proceeding with this.  He was appreciative of discussion.  Appears to have good prognostic awareness while being hopeful.  Denied any questions at this time.  Encouraged if arise.  Support provided.  Updated nursing and hospitalist APRN of discussion.     Thank you for allowing us to participate in patient's plan of care. Palliative Care Team will continue to follow patient.    Time spent:65 minutes spent reviewing medical and medication records, assessing and examining patient, discussing with family, answering questions, providing some guidance about a plan and documentation of care, and coordinating care with other healthcare members, with > 50% time spent face to face.   30 minutes spent on advance care planning.     Electronically signed by, BERT White, 07/17/24.   "

## 2024-07-17 NOTE — PROGRESS NOTES
Inpatient Nutrition Services  Patient Name:  Namita Zabala  YOB: 1975  MRN: [id number]  Admit Date: 7/14/2024  Assessment Date:  7/17/2024       Reason for Assessment       Row Name 07/17/24 1613          Reason for Assessment    Reason For Assessment follow-up protocol;TF/PN     Diagnosis neurologic conditions;infection/sepsis;pulmonary disease;nutrition related history                    Nutrition/Diet History       Row Name 07/17/24 1613          Nutrition/Diet History    Typical Intake (Food/Fluid/EN/PN) TF held. KUB shows progressive ileus and the possibility of SBO. Palliative note reviewed. Will continue to monitor while inpatient.     Food Intolerance(s) Food allergies: turkey, peanuts, nuts, coconut     Enteral Nutrition Regimen Held     Factors Affecting Nutritional Intake altered gastrointestinal function                    Labs/Tests/Procedures/Meds       Row Name 07/17/24 1614          Labs/Procedures/Meds    Lab Results Reviewed reviewed     Lab Results Comments Cr, Hg        Diagnostic Tests/Procedures    Diagnostic Test/Procedure Reviewed reviewed     Diagnostic Test/Procedures Comments KUB        Medications    Pertinent Medications Reviewed reviewed     Pertinent Medications Comments See MAR                    Physical Findings       Row Name 07/17/24 1617          Physical Findings    Overall Physical Appearance Trach, PEG, BM 7/16, Pritesh Score 13, no skin breakdown, chronic nephrostomy tube and urethral catheter.                    Estimated/Assessed Needs - Anthropometrics       Row Name 07/17/24 1618          Anthropometrics    Weight for Calculation 59 kg (130 lb 1.1 oz)        Estimated/Assessed Needs    Additional Documentation Fluid Requirements (Group);KCAL/KG (Group);Protein Requirements (Group)        KCAL/KG    KCAL/KG 25 Kcal/Kg (kcal)     25 Kcal/Kg (kcal) 1475        Protein Requirements    Weight Used For Protein Calculations 59 kg (130 lb 1.1 oz)     Est  Protein Requirement Amount (gms/kg) 1.1 gm protein     Estimated Protein Requirements (gms/day) 64.9        Fluid Requirements    Fluid Requirements (mL/day) 1770  30mL/kg     RDA Method (mL) 1770                    Nutrition Prescription Ordered       Row Name 07/17/24 1616          Nutrition Prescription PO    Current PO Diet NPO        Nutrition Prescription EN    Enteral Route PEG  held     Product Peptamen 1.5 (Vital 1.5)     TF Delivery Method Continuous     Continuous TF Goal Rate (mL/hr) 45 mL/hr     Continuous TF Current Rate (mL/hr) 0 mL/hr     Water flush (mL)  45 mL     Water Flush Frequency Per hour                    Evaluation of Received Nutrient/Fluid Intake       Row Name 07/17/24 1616          Nutrient/Fluid Evaluation    Number of Days Evaluated 2 days        Calories Evaluation    Total Calorie Target (kcal) 1475     Enteral Calories (kcal) 687  admit to present total        Protein Evaluation    Total Protein Target (g) 65     Enteral Protein (g) 31  admit to present total        Fluid Intake Evaluation    Total Fluid Target (mL) 1770     Enteral Fluid (mL) 352     Other Fluid (mL) 2590  free water flush        EN Evaluation    Number of Days EN Intake Evaluated Insufficient Data     EN Average Volume Delivered (mL/day) 458 mL/day                       Problem/Interventions:   Problem 1       Row Name 07/17/24 1619          Nutrition Diagnoses Problem 1    Problem 1 Needs Alternate     Etiology (related to) Medical Diagnosis     Gastrointestinal Other (comment);SBO;Ileus  PEG; progressing ileus or possible SBO     Neurological Anoxic brain injury;Other (comment)  with dysphagia     Pulmonary/Critical Care Other (comment)  trach dependent     Signs/Symptoms (evidenced by) NPO;EN Intake Delivery     Percent (%) of EN goal 0 %                          Intervention Goal       Row Name 07/17/24 1619          Intervention Goal    General Disease management/therapy;Nutrition support treatment      TF/PN Establish TF tolerance     Deliver % of Goal 75 %  or greater     Deliver % of Estimated Need 80 %  greater than     Weight Maintain weight                    Nutrition Intervention       Row Name 07/17/24 1619          Nutrition Intervention    RD/Tech Action Care plan reviewd                    Nutrition Prescription       Row Name 07/17/24 1619          Other Orders    Other resume when medically appropriate                    Education/Evaluation       Row Name 07/17/24 1620          Education    Education No discharge needs identified at this time        Monitor/Evaluation    Monitor Per protocol                     Electronically signed by:  Morelia Warner RDN, LINDA  07/17/24 16:20 CDT

## 2024-07-17 NOTE — PROGRESS NOTES
Patient Name: Namita Zabala  Date of Admission: 7/14/2024  Today's Date: 07/17/24  Length of Stay: 2  Primary Care Physician: David Lara MD    Subjective   Chief Complaint: Clogged PEG tube and displaced tracheostomy.  HPI   Namita Zabala is a 49-year-old female with a history of anoxic brain damage, tracheostomy, PEG tube, chronic respiratory failure, pulmonary embolism, recurrent UTI, and staghorn renal calculus.  Patient was transferred per EMS from [facility] with complaints of a clogged PEG tube and displaced tracheostomy.  Upon assessment at [facility] emergency department the patient presented in a septic state with fever of 101.7, tachycardia at 121, WBC of 18.8, with obvious source of infection, as urinalysis revealed turbid urine with hematuria and nitrates, leukocytes, and 4+ bacteria.  Chronic Mojica catheter managed per outpatient urology at [facility], most recent appointment was 6/27/2024 where she was evaluated post nephrostomy tube being removed, where their recommendations were to observe rather than replace tube at that time.  ED workup revealed tracheostomy was not dislodged however it was extremely clogged with debris and dried secretions, when that was cleaned and replaced patient was able to utilize properly.  Additionally PEG tube was not dislodged, after cleaning debris and evaluating PEG tube it was in proper alignment.  CT scan revealed thickening in handedness on the mucosal urothelium of the collecting system bilaterally may represent acute or subacute inflammatory process/pyeloureteritis, no evidence of acute.  Stable nonobstructive bilateral staghorn calculi, and significant large volume stool in the distal colon with moderate gas distention of proximal colon.  Mildly dilated fluid filled small bowel loops may represent ileus or acute nonspecific enteritis.  No evidence of small bowel obstruction.  ED medications 1 L normal saline bolus,  650 mg Tylenol suppository, and 1 g of Rocephin.  Previous urine culture 5/24 was positive for ESBL and Proteus Mirabilis.  Susceptible to ceftriaxone, 2 g continued.    Today: Patient appears to be resting as comfortably as possible, trach collar 9 L.  Oxygen saturation 94%.    Patient continues to grimace in between care, nursing staff states Toradol has improved.  Patient continues to be in a mostly vegetative state, able to open eyes she was unable to follow me today.  Repeat KUB this morning showed moderate progressive ileus, possibility of small bowel obstruction.  Patient's tube feed and G-tube meds were placed on hold, to reevaluate with family for how to proceed.  Palliative care performed an extensive discussion today with the patient's father discussing the gravity of her cognitive and physical state, and that given her multiple comorbidities, this will continue to be flung cyclical for what life she has left.  It was stated that the patient appears to have a good prognostic awareness of her dire situation.  Like to go home and discuss with his wife and will let us know what their goals of care will be tomorrow.  Repeat KUB in a.m. to evaluate if surgical intervention may be required if patient's family wants to pursue aggressive treatment.      Review of Systems   Constitutional:  Positive for diaphoresis and fever.   HENT:  Positive for congestion, drooling, rhinorrhea and trouble swallowing.    Eyes:  Positive for discharge. Negative for redness.   Respiratory:  Positive for cough and wheezing.    Cardiovascular:         Sinus tachycardia   Gastrointestinal:  Positive for constipation. Negative for abdominal distention, diarrhea and vomiting.   Endocrine: Negative for heat intolerance.   Genitourinary:         Chronic Mojica catheter, recent removal of nephrostomy tube   Musculoskeletal:         Patient is bedbound with chronic contractures of all extremities.   Skin:  Positive for pallor and wound.         Healing coccyx wound   Neurological:  Positive for facial asymmetry, speech difficulty and weakness.   Hematological:  Does not bruise/bleed easily.   Psychiatric/Behavioral:  Positive for confusion.       All pertinent negatives and positives are as above. All other systems have been reviewed and are negative unless otherwise stated.     Objective    Temp:  [98.1 °F (36.7 °C)-100 °F (37.8 °C)] 100 °F (37.8 °C)  Heart Rate:  [] 80  Resp:  [16-18] 16  BP: (119-148)/(67-86) 147/81  Physical Exam  Constitutional:       Appearance: She is cachectic. She is ill-appearing and diaphoretic.   HENT:      Head: Normocephalic.      Nose: Congestion and rhinorrhea present.      Mouth/Throat:      Mouth: Mucous membranes are moist.      Pharynx: Oropharynx is clear.   Eyes:      Pupils: Pupils are equal, round, and reactive to light.   Neck:      Comments: Chronic contractures of neck  Cardiovascular:      Rate and Rhythm: Regular rhythm. Tachycardia present.      Pulses: Normal pulses.      Comments: S/ST , up to 144  Pulmonary:      Breath sounds: Wheezing and rhonchi present.      Comments: Trach collar at 9 L  Abdominal:      General: Bowel sounds are decreased. There is no distension.      Palpations: Abdomen is soft.      Tenderness: There is no abdominal tenderness.   Genitourinary:     Comments: Voiding per chronic Mojica catheter  Musculoskeletal:      Cervical back: Rigidity present.      Right lower leg: No edema.      Left lower leg: No edema.      Comments: Bedbound with chronic fractures of all extremities   Skin:     General: Skin is warm and moist.      Capillary Refill: Capillary refill takes less than 2 seconds.      Coloration: Skin is pale.   Neurological:      Mental Status: She is alert. She is disoriented.      Motor: Weakness, atrophy and abnormal muscle tone present.   Psychiatric:         Attention and Perception: She is inattentive.         Mood and Affect: Affect is flat.          Behavior: Behavior is slowed and withdrawn.         Cognition and Memory: Cognition is impaired.      Comments: Nonverbal at baseline       Results Review:  I have reviewed the labs, radiology results, and diagnostic studies.    Laboratory Data:   Results from last 7 days   Lab Units 07/17/24 0411 07/16/24  0654 07/15/24  0600   WBC 10*3/mm3 10.74 13.60* 17.84*   HEMOGLOBIN g/dL 10.7* 10.1* 10.3*   HEMATOCRIT % 34.0 32.4* 32.7*   PLATELETS 10*3/mm3 287 245 207        Results from last 7 days   Lab Units 07/17/24  0411 07/16/24  0654 07/15/24  0600 07/14/24  2231   SODIUM mmol/L 140 141 141 141   POTASSIUM mmol/L 4.1 4.3 3.5 4.5   CHLORIDE mmol/L 107 107 110* 104   CO2 mmol/L 21.0* 22.0 23.0 26.0   BUN mg/dL 13 20 23* 29*   CREATININE mg/dL 0.32* 0.34* 0.40* 0.46*   CALCIUM mg/dL 9.0 9.5 8.8 10.1   BILIRUBIN mg/dL  --   --   --  0.7   ALK PHOS U/L  --   --   --  84   ALT (SGPT) U/L  --   --   --  14   AST (SGOT) U/L  --   --   --  14   GLUCOSE mg/dL 106* 109* 113* 141*     Urine Culture - Urine, Indwelling Urethral Catheter  Order: [id number] - Reflex for Order [id number]  Status: Final result       Visible to patient: No (scheduled for 7/17/2024 12:06 PM)       Next appt: None    Specimen Information: Indwelling Urethral Catheter; Urine   0 Result Notes  Urine Culture >100,000 CFU/mL Proteus mirabilis Abnormal          Colonization of the urinary tract without infection is common. Treatment is discouraged unless the patient is symptomatic, pregnant, or undergoing an invasive urologic procedure.        Resulting Agency: [facility]     Susceptibility     Proteus mirabilis     SANDEEP     Amoxicillin + Clavulanate <=2 ug/ml Susceptible     Ampicillin <=2 ug/ml Susceptible     Ampicillin + Sulbactam <=2 ug/ml Susceptible     Cefazolin <=4 ug/ml Susceptible     Cefepime <=1 ug/ml Susceptible     Ceftazidime <=1 ug/ml Susceptible     Ceftriaxone <=1 ug/ml Susceptible     Gentamicin <=1 ug/ml Susceptible     Levofloxacin 4  ug/ml Resistant     Nitrofurantoin 128 ug/ml Resistant     Piperacillin + Tazobactam <=4 ug/ml Susceptible     Trimethoprim + Sulfamethoxazole <=20 ug/ml Susceptible               Linear View         Specimen Collected: 07/14/24 22:15 CDT Last Resulted: 07/17/24 11:06 CDT               Radiology Data:   Imaging Results (Last 24 Hours)       Procedure Component Value Units Date/Time    XR Abdomen KUB [[id number]] Collected: 07/17/24 0650     Updated: 07/17/24 0656    Narrative:      EXAMINATION: XR ABDOMEN KUB-     7/17/2024 2:48 AM     HISTORY: reevaluation of ilieus; N39.0-Urinary tract infection, site not  specified; A41.9-Sepsis, unspecified organism     A frontal projection of the abdomen is compared with the previous study  dated 7/15/2024.     The moderately more progressive dilated loops of small and large bowel  throughout the abdomen.     There is moderate amount of stool in the colon more so in the rectum.     Moderately gas distended stomach is seen.     No acute bony abnormality.       Impression:      1. Moderate progressive ileus. The possibility of small bowel  obstruction may not be excluded. Further follow-up may be obtained.  2. Gastrostomy tube in place.        This report was signed and finalized on 7/17/2024 6:53 AM by Dr. Lui Bush MD.               I have reviewed the patient's current medications.     [Held by provider] baclofen, 40 mg, Per G Tube, TID  bisacodyl, 10 mg, Rectal, Daily  cefTRIAXone, 2,000 mg, Intravenous, Q24H  [Held by provider] cetirizine, 10 mg, Per G Tube, Daily  chlorhexidine, 15 mL, Mouth/Throat, BID  [Held by provider] docusate, 150 mg, Per G Tube, BID  enoxaparin, 40 mg, Subcutaneous, Daily  [Held by provider] Ferrous Sulfate, 300 mg, Per G Tube, BID  fluticasone, 2 spray, Nasal, QAM  [Held by provider] guaifenesin, 600 mg, Per G Tube, BID  metoprolol tartrate, 5 mg, Intravenous, Q6H  [Held by provider] metoprolol tartrate, 50 mg, Per G Tube, BID  nystatin, 1  Application, Topical, BID  [Held by provider] pantoprazole, 40 mg, Oral, BID  [Held by provider] polyethylene glycol, 17 g, Per G Tube, Daily  potassium chloride, 20 mEq, Per G Tube, BID  [Held by provider] rosuvastatin, 5 mg, Per G Tube, Nightly  [Held by provider] saccharomyces boulardii, 250 mg, Per G Tube, Daily  Scopolamine, 1 patch, Transdermal, Q72H  [Held by provider] sennosides-docusate, 2 tablet, Oral, BID  [Held by provider] simethicone, 20 mg, Per G Tube, Q6H  sodium chloride, 10 mL, Intravenous, Q12H  [Held by provider] thiamine, 100 mg, Per G Tube, Daily  [Held by provider] tiZANidine, 4 mg, Per G Tube, Q8H         Assessment/Plan   Assessment  Active Hospital Problems    Diagnosis     **Sepsis secondary to UTI     Presence of externally removable percutaneous endoscopic gastrostomy (PEG) tube     History of anoxic brain injury     Functional quadriplegia     Tracheostomy present     Ileus        Treatment Plan  1.  Sepsis secondary to UTI-7/14/2024 septic state with fever of 101.7, tachycardia at 121, WBC of 18.8, with obvious source of infection, as urinalysis revealed turbid urine with hematuria and nitrates, leukocytes, and 4+ bacteria.  Patient has had multiple UTIs over the last several months last of which was 5/2024 with ESBL and Proteus  Mirabilis, which was susceptible to ceftriaxone.  Urine culture positive today given for Proteus Mirabilis, continue 2 g ceftriaxone do to susceptibility One blood cultures positive for Staphylococcus, contaminant.    2.  History of anoxic brain injury/functional quadriplegia-according to nursing facility patient is at her baseline, they transferred her due to possible trach dislodgment.  Patient appears at her baseline, she continues to be nonverbal, will follow commands with her eyes however is unable to move any extremities.  Palliative care had an extensive discussion with patient's father today goal, going overall goals of care to include palliative care  versus hospice care.  He will go home this evening and discussed the treatment with his wife and will let us know in the morning.    3.  Tracheostomy present-patient was brought to the ED due to possible trach dislodgment.  After MD and [facility] ED evaluated, it was caked in debris and old dried accretions.  Once that was cleaned and reassess tracheostomy was in place and patient resumed home tracheostomy immunity.  Currently patient is stable on 9 L tracheostomy collar, oxygen saturation is 94% at present.  Continue tracheostomy care protocol, ABGs as needed, pulmonary hygiene and suctioning as needed.    4.  Ileus-patient CT scan on admission revealed possible ileus or acute nonspecific enteritis.  Follow-up KUB this morning revealed probable small bowel traction, tube feed and G-tube medications on hold.  Nishant KUB in the a.m. to determine if tube feeds can be restarted or if surgical intervention may be required if family decides to pursue treatment.    5.  Presence of externally removable percutaneous endoscopic gastrostomy tube-tube feeding on hold due to probable small bowel obstruction.  Reevaluate with KUB in AM.      VTE prophylaxis Lovenox 40 mg.  Labs in a.m.    Medical Decision Making  Number and Complexity of problems: 5  Differential Diagnosis: None    Conditions and Status        Condition is unchanged.     [facility] Data  External documents reviewed:   CODE STATUS-DNR/DNI per previous documentation  Cardiac tracing (EKG, telemetry) interpretation: 7/17/2024 EKG interpretation sinus tachycardia, ventricular rate of 111, atrial rate of 111, QTc 435 ms  radiology interpretation:   7/17/2024 KUB per radiology reviewed  Labs reviewed:   1/17/2024 BMP, CBC, blood and urine culture final results, reviewed, repeat in a.m.  Any tests that were considered but not ordered: None     Decision rules/scores evaluated (example [id number], Wells, etc): None  Discussed with: Dr. Charles, BERT Lee, palliative care  and patient     Care Planning  Shared decision making: Dr. Charles, BERT Lee palliative care and patient   code status and discussions: DNR/DNI per documentation with patient upon admission.  Surrogate Decision Maker her mother Marquita    Disposition  Social Determinants of Health that impact treatment or disposition: Patient is a resident of [facility] family is requesting discharge to new SNF.  I expect the patient to be discharged to SNF in unknown days.     Electronically signed by BERT Asif, 07/17/24, 16:43 CDT.

## 2024-07-18 ENCOUNTER — APPOINTMENT (OUTPATIENT)
Dept: CT IMAGING | Facility: HOSPITAL | Age: 49
End: 2024-07-18
Payer: MEDICARE

## 2024-07-18 ENCOUNTER — APPOINTMENT (OUTPATIENT)
Dept: GENERAL RADIOLOGY | Facility: HOSPITAL | Age: 49
End: 2024-07-18
Payer: MEDICARE

## 2024-07-18 PROBLEM — E87.29 HIGH ANION GAP METABOLIC ACIDOSIS: Status: ACTIVE | Noted: 2024-07-18

## 2024-07-18 PROBLEM — K56.609 SMALL BOWEL OBSTRUCTION: Status: RESOLVED | Noted: 2024-07-17 | Resolved: 2024-07-18

## 2024-07-18 PROBLEM — Z97.8 CHRONIC INDWELLING FOLEY CATHETER: Status: ACTIVE | Noted: 2024-07-18

## 2024-07-18 PROBLEM — K59.00 CONSTIPATION: Status: ACTIVE | Noted: 2024-07-18

## 2024-07-18 PROBLEM — J95.03 TRACHEOSTOMY MALFUNCTION: Status: ACTIVE | Noted: 2024-07-18

## 2024-07-18 LAB
ANION GAP SERPL CALCULATED.3IONS-SCNC: 18 MMOL/L (ref 5–15)
ANION GAP SERPL CALCULATED.3IONS-SCNC: 18 MMOL/L (ref 5–15)
ARTERIAL PATENCY WRIST A: ABNORMAL
ARTERIAL PATENCY WRIST A: ABNORMAL
ATMOSPHERIC PRESS: 754 MMHG
ATMOSPHERIC PRESS: 755 MMHG
BASE EXCESS BLDA CALC-SCNC: -4.6 MMOL/L (ref 0–2)
BASE EXCESS BLDA CALC-SCNC: -6.8 MMOL/L (ref 0–2)
BDY SITE: ABNORMAL
BDY SITE: ABNORMAL
BODY TEMPERATURE: 37
BODY TEMPERATURE: 37
BUN SERPL-MCNC: 18 MG/DL (ref 6–20)
BUN SERPL-MCNC: 18 MG/DL (ref 6–20)
BUN/CREAT SERPL: 45 (ref 7–25)
BUN/CREAT SERPL: 46.2 (ref 7–25)
CALCIUM SPEC-SCNC: 9.8 MG/DL (ref 8.6–10.5)
CALCIUM SPEC-SCNC: 9.8 MG/DL (ref 8.6–10.5)
CHLORIDE SERPL-SCNC: 101 MMOL/L (ref 98–107)
CHLORIDE SERPL-SCNC: 102 MMOL/L (ref 98–107)
CO2 SERPL-SCNC: 17 MMOL/L (ref 22–29)
CO2 SERPL-SCNC: 19 MMOL/L (ref 22–29)
CREAT SERPL-MCNC: 0.39 MG/DL (ref 0.57–1)
CREAT SERPL-MCNC: 0.4 MG/DL (ref 0.57–1)
D-LACTATE SERPL-SCNC: 1.3 MMOL/L (ref 0.5–2)
DEPRECATED RDW RBC AUTO: 42.2 FL (ref 37–54)
EGFRCR SERPLBLD CKD-EPI 2021: 123 ML/MIN/1.73
EGFRCR SERPLBLD CKD-EPI 2021: 123.8 ML/MIN/1.73
EOSINOPHIL # BLD MANUAL: 0.13 10*3/MM3 (ref 0–0.4)
EOSINOPHIL NFR BLD MANUAL: 1 % (ref 0.3–6.2)
ERYTHROCYTE [DISTWIDTH] IN BLOOD BY AUTOMATED COUNT: 12.7 % (ref 12.3–15.4)
GLUCOSE BLDC GLUCOMTR-MCNC: 100 MG/DL (ref 70–130)
GLUCOSE BLDC GLUCOMTR-MCNC: 126 MG/DL (ref 70–130)
GLUCOSE BLDC GLUCOMTR-MCNC: 135 MG/DL (ref 70–130)
GLUCOSE BLDC GLUCOMTR-MCNC: 194 MG/DL (ref 70–130)
GLUCOSE BLDC GLUCOMTR-MCNC: 68 MG/DL (ref 70–130)
GLUCOSE BLDC GLUCOMTR-MCNC: 97 MG/DL (ref 70–130)
GLUCOSE SERPL-MCNC: 86 MG/DL (ref 65–99)
GLUCOSE SERPL-MCNC: 95 MG/DL (ref 65–99)
HCO3 BLDA-SCNC: 19.1 MMOL/L (ref 20–26)
HCO3 BLDA-SCNC: 19.6 MMOL/L (ref 20–26)
HCT VFR BLD AUTO: 38.5 % (ref 34–46.6)
HGB BLD-MCNC: 12.5 G/DL (ref 12–15.9)
INHALED O2 CONCENTRATION: 100 %
INHALED O2 CONCENTRATION: 40 %
LYMPHOCYTES # BLD MANUAL: 0.78 10*3/MM3 (ref 0.7–3.1)
LYMPHOCYTES NFR BLD MANUAL: 2 % (ref 5–12)
Lab: ABNORMAL
Lab: ABNORMAL
MCH RBC QN AUTO: 29.3 PG (ref 26.6–33)
MCHC RBC AUTO-ENTMCNC: 32.5 G/DL (ref 31.5–35.7)
MCV RBC AUTO: 90.4 FL (ref 79–97)
MODALITY: ABNORMAL
MODALITY: ABNORMAL
MONOCYTES # BLD: 0.26 10*3/MM3 (ref 0.1–0.9)
NEUTROPHILS # BLD AUTO: 11.88 10*3/MM3 (ref 1.7–7)
NEUTROPHILS NFR BLD MANUAL: 89 % (ref 42.7–76)
NEUTS BAND NFR BLD MANUAL: 2 % (ref 0–5)
NOTIFIED BY: ABNORMAL
PCO2 BLDA: 30.2 MM HG (ref 35–45)
PCO2 BLDA: 41.4 MM HG (ref 35–45)
PCO2 TEMP ADJ BLD: 30.2 MM HG (ref 35–45)
PCO2 TEMP ADJ BLD: 41.4 MM HG (ref 35–45)
PH BLDA: 7.28 PH UNITS (ref 7.35–7.45)
PH BLDA: 7.41 PH UNITS (ref 7.35–7.45)
PH, TEMP CORRECTED: 7.28 PH UNITS (ref 7.35–7.45)
PH, TEMP CORRECTED: 7.41 PH UNITS (ref 7.35–7.45)
PLATELET # BLD AUTO: 255 10*3/MM3 (ref 140–450)
PMV BLD AUTO: 11.8 FL (ref 6–12)
PO2 BLD: 222 MM[HG] (ref 0–500)
PO2 BLD: 53 MM[HG] (ref 0–500)
PO2 BLDA: 52.5 MM HG (ref 83–108)
PO2 BLDA: 88.9 MM HG (ref 83–108)
PO2 TEMP ADJ BLD: 52.5 MM HG (ref 83–108)
PO2 TEMP ADJ BLD: 88.9 MM HG (ref 83–108)
POTASSIUM SERPL-SCNC: 4.1 MMOL/L (ref 3.5–5.2)
POTASSIUM SERPL-SCNC: 4.3 MMOL/L (ref 3.5–5.2)
RBC # BLD AUTO: 4.26 10*6/MM3 (ref 3.77–5.28)
RBC MORPH BLD: NORMAL
SAO2 % BLDCOA: 82.3 % (ref 94–99)
SAO2 % BLDCOA: 97.4 % (ref 94–99)
SMALL PLATELETS BLD QL SMEAR: ADEQUATE
SODIUM SERPL-SCNC: 137 MMOL/L (ref 136–145)
SODIUM SERPL-SCNC: 138 MMOL/L (ref 136–145)
TOXIC GRANULATION: ABNORMAL
VARIANT LYMPHS NFR BLD MANUAL: 6 % (ref 19.6–45.3)
VENTILATOR MODE: ABNORMAL
VENTILATOR MODE: ABNORMAL
WBC NRBC COR # BLD AUTO: 13.06 10*3/MM3 (ref 3.4–10.8)

## 2024-07-18 PROCEDURE — 85025 COMPLETE CBC W/AUTO DIFF WBC: CPT

## 2024-07-18 PROCEDURE — 99232 SBSQ HOSP IP/OBS MODERATE 35: CPT

## 2024-07-18 PROCEDURE — 94799 UNLISTED PULMONARY SVC/PX: CPT

## 2024-07-18 PROCEDURE — 25010000002 METHYLPREDNISOLONE PER 125 MG: Performed by: INTERNAL MEDICINE

## 2024-07-18 PROCEDURE — 71045 X-RAY EXAM CHEST 1 VIEW: CPT

## 2024-07-18 PROCEDURE — 82803 BLOOD GASES ANY COMBINATION: CPT

## 2024-07-18 PROCEDURE — 80048 BASIC METABOLIC PNL TOTAL CA: CPT | Performed by: INTERNAL MEDICINE

## 2024-07-18 PROCEDURE — 25010000002 METRONIDAZOLE 500 MG/100ML SOLUTION: Performed by: INTERNAL MEDICINE

## 2024-07-18 PROCEDURE — 80048 BASIC METABOLIC PNL TOTAL CA: CPT

## 2024-07-18 PROCEDURE — 74176 CT ABD & PELVIS W/O CONTRAST: CPT

## 2024-07-18 PROCEDURE — 0 DIATRIZOATE MEGLUMINE & SODIUM PER 1 ML: Performed by: FAMILY MEDICINE

## 2024-07-18 PROCEDURE — 25010000002 KETOROLAC TROMETHAMINE PER 15 MG

## 2024-07-18 PROCEDURE — 94761 N-INVAS EAR/PLS OXIMETRY MLT: CPT

## 2024-07-18 PROCEDURE — 83605 ASSAY OF LACTIC ACID: CPT | Performed by: INTERNAL MEDICINE

## 2024-07-18 PROCEDURE — 82948 REAGENT STRIP/BLOOD GLUCOSE: CPT

## 2024-07-18 PROCEDURE — 87040 BLOOD CULTURE FOR BACTERIA: CPT | Performed by: INTERNAL MEDICINE

## 2024-07-18 PROCEDURE — 25010000002 CEFTRIAXONE PER 250 MG

## 2024-07-18 PROCEDURE — 25810000003 LACTATED RINGERS PER 1000 ML: Performed by: INTERNAL MEDICINE

## 2024-07-18 PROCEDURE — 31615 TRCHEOBRNCHSC EST TRACHS INC: CPT | Performed by: OTOLARYNGOLOGY

## 2024-07-18 PROCEDURE — 36600 WITHDRAWAL OF ARTERIAL BLOOD: CPT

## 2024-07-18 PROCEDURE — 25010000002 DIPHENHYDRAMINE PER 50 MG: Performed by: INTERNAL MEDICINE

## 2024-07-18 PROCEDURE — 25010000002 ONDANSETRON PER 1 MG: Performed by: FAMILY MEDICINE

## 2024-07-18 PROCEDURE — 25010000002 ENOXAPARIN PER 10 MG: Performed by: FAMILY MEDICINE

## 2024-07-18 PROCEDURE — 99497 ADVNCD CARE PLAN 30 MIN: CPT

## 2024-07-18 PROCEDURE — 0DJ08ZZ INSPECTION OF UPPER INTESTINAL TRACT, VIA NATURAL OR ARTIFICIAL OPENING ENDOSCOPIC: ICD-10-PCS | Performed by: OTOLARYNGOLOGY

## 2024-07-18 PROCEDURE — 85007 BL SMEAR W/DIFF WBC COUNT: CPT

## 2024-07-18 PROCEDURE — 99222 1ST HOSP IP/OBS MODERATE 55: CPT | Performed by: NURSE PRACTITIONER

## 2024-07-18 PROCEDURE — 0BP1XFZ REMOVAL OF TRACHEOSTOMY DEVICE FROM TRACHEA, EXTERNAL APPROACH: ICD-10-PCS | Performed by: OTOLARYNGOLOGY

## 2024-07-18 RX ORDER — SODIUM CHLORIDE, SODIUM LACTATE, POTASSIUM CHLORIDE, CALCIUM CHLORIDE 600; 310; 30; 20 MG/100ML; MG/100ML; MG/100ML; MG/100ML
75 INJECTION, SOLUTION INTRAVENOUS CONTINUOUS
Status: DISCONTINUED | OUTPATIENT
Start: 2024-07-18 | End: 2024-07-22 | Stop reason: HOSPADM

## 2024-07-18 RX ORDER — BISACODYL 10 MG
10 SUPPOSITORY, RECTAL RECTAL DAILY
Status: DISCONTINUED | OUTPATIENT
Start: 2024-07-18 | End: 2024-07-22 | Stop reason: HOSPADM

## 2024-07-18 RX ORDER — METRONIDAZOLE 500 MG/100ML
500 INJECTION, SOLUTION INTRAVENOUS EVERY 8 HOURS
Status: COMPLETED | OUTPATIENT
Start: 2024-07-18 | End: 2024-07-21

## 2024-07-18 RX ORDER — DEXTROSE MONOHYDRATE 25 G/50ML
25 INJECTION, SOLUTION INTRAVENOUS
Status: DISCONTINUED | OUTPATIENT
Start: 2024-07-18 | End: 2024-07-22 | Stop reason: HOSPADM

## 2024-07-18 RX ORDER — NICOTINE POLACRILEX 4 MG
15 LOZENGE BUCCAL
Status: DISCONTINUED | OUTPATIENT
Start: 2024-07-18 | End: 2024-07-22 | Stop reason: HOSPADM

## 2024-07-18 RX ORDER — FAMOTIDINE 10 MG/ML
20 INJECTION, SOLUTION INTRAVENOUS ONCE
Status: COMPLETED | OUTPATIENT
Start: 2024-07-18 | End: 2024-07-18

## 2024-07-18 RX ORDER — DIPHENHYDRAMINE HYDROCHLORIDE 50 MG/ML
25 INJECTION INTRAMUSCULAR; INTRAVENOUS ONCE
Status: COMPLETED | OUTPATIENT
Start: 2024-07-18 | End: 2024-07-18

## 2024-07-18 RX ORDER — ALVIMOPAN 12 MG/1
12 CAPSULE ORAL ONCE
Status: CANCELLED | OUTPATIENT
Start: 2024-07-18 | End: 2024-07-25

## 2024-07-18 RX ORDER — HYDROCODONE BITARTRATE AND ACETAMINOPHEN 10; 325 MG/1; MG/1
1 TABLET ORAL EVERY 6 HOURS PRN
Status: CANCELLED | OUTPATIENT
Start: 2024-07-18 | End: 2024-07-23

## 2024-07-18 RX ORDER — ALVIMOPAN 12 MG/1
12 CAPSULE ORAL 2 TIMES DAILY
Status: CANCELLED | OUTPATIENT
Start: 2024-07-18 | End: 2024-07-25

## 2024-07-18 RX ORDER — IBUPROFEN 600 MG/1
1 TABLET ORAL
Status: DISCONTINUED | OUTPATIENT
Start: 2024-07-18 | End: 2024-07-22 | Stop reason: HOSPADM

## 2024-07-18 RX ORDER — METHYLPREDNISOLONE SODIUM SUCCINATE 125 MG/2ML
125 INJECTION, POWDER, LYOPHILIZED, FOR SOLUTION INTRAMUSCULAR; INTRAVENOUS ONCE
Status: COMPLETED | OUTPATIENT
Start: 2024-07-18 | End: 2024-07-18

## 2024-07-18 RX ADMIN — BACLOFEN 40 MG: 10 TABLET ORAL at 17:25

## 2024-07-18 RX ADMIN — GUAIFENESIN 600 MG: 200 SOLUTION ORAL at 20:53

## 2024-07-18 RX ADMIN — Medication 20 MG: at 20:18

## 2024-07-18 RX ADMIN — DIATRIZOATE MEGLUMINE AND DIATRIZOATE SODIUM 10 ML: 660; 100 LIQUID ORAL; RECTAL at 04:20

## 2024-07-18 RX ADMIN — Medication 10 ML: at 09:20

## 2024-07-18 RX ADMIN — DOCUSATE SODIUM 150 MG: 50 LIQUID ORAL at 20:53

## 2024-07-18 RX ADMIN — NYSTATIN 1 APPLICATION: 100000 POWDER TOPICAL at 20:17

## 2024-07-18 RX ADMIN — ONDANSETRON 4 MG: 2 INJECTION INTRAMUSCULAR; INTRAVENOUS at 20:51

## 2024-07-18 RX ADMIN — NYSTATIN 1 APPLICATION: 100000 POWDER TOPICAL at 09:20

## 2024-07-18 RX ADMIN — BISACODYL 10 MG: 10 SUPPOSITORY RECTAL at 13:19

## 2024-07-18 RX ADMIN — CHLORHEXIDINE GLUCONATE 0.12% ORAL RINSE 15 ML: 1.2 LIQUID ORAL at 09:19

## 2024-07-18 RX ADMIN — DIATRIZOATE MEGLUMINE AND DIATRIZOATE SODIUM 10 ML: 660; 100 LIQUID ORAL; RECTAL at 03:10

## 2024-07-18 RX ADMIN — TIZANIDINE 4 MG: 4 TABLET ORAL at 13:56

## 2024-07-18 RX ADMIN — RACEPINEPHRINE HYDROCHLORIDE 0.5 ML: 11.25 SOLUTION RESPIRATORY (INHALATION) at 05:27

## 2024-07-18 RX ADMIN — CEFTRIAXONE SODIUM 2000 MG: 2 INJECTION, POWDER, FOR SOLUTION INTRAMUSCULAR; INTRAVENOUS at 20:25

## 2024-07-18 RX ADMIN — SCOPALAMINE 1 PATCH: 1 PATCH, EXTENDED RELEASE TRANSDERMAL at 13:58

## 2024-07-18 RX ADMIN — METHYLPREDNISOLONE SODIUM SUCCINATE 125 MG: 125 INJECTION, POWDER, FOR SOLUTION INTRAMUSCULAR; INTRAVENOUS at 05:37

## 2024-07-18 RX ADMIN — METRONIDAZOLE 500 MG: 500 INJECTION, SOLUTION INTRAVENOUS at 21:00

## 2024-07-18 RX ADMIN — DIPHENHYDRAMINE HYDROCHLORIDE 25 MG: 50 INJECTION, SOLUTION INTRAMUSCULAR; INTRAVENOUS at 05:40

## 2024-07-18 RX ADMIN — SODIUM CHLORIDE, POTASSIUM CHLORIDE, SODIUM LACTATE AND CALCIUM CHLORIDE 75 ML/HR: 600; 310; 30; 20 INJECTION, SOLUTION INTRAVENOUS at 12:02

## 2024-07-18 RX ADMIN — ROSUVASTATIN CALCIUM 5 MG: 5 TABLET, FILM COATED ORAL at 20:52

## 2024-07-18 RX ADMIN — METRONIDAZOLE 500 MG: 500 INJECTION, SOLUTION INTRAVENOUS at 13:57

## 2024-07-18 RX ADMIN — Medication 20 MG: at 17:26

## 2024-07-18 RX ADMIN — CHLORHEXIDINE GLUCONATE 0.12% ORAL RINSE 15 ML: 1.2 LIQUID ORAL at 20:51

## 2024-07-18 RX ADMIN — BACLOFEN 40 MG: 10 TABLET ORAL at 20:53

## 2024-07-18 RX ADMIN — TIZANIDINE 4 MG: 4 TABLET ORAL at 21:00

## 2024-07-18 RX ADMIN — METOPROLOL TARTRATE 50 MG: 50 TABLET, FILM COATED ORAL at 20:52

## 2024-07-18 RX ADMIN — FLUTICASONE PROPIONATE 2 SPRAY: 50 SPRAY, METERED NASAL at 05:42

## 2024-07-18 RX ADMIN — PANTOPRAZOLE SODIUM 40 MG: 40 TABLET, DELAYED RELEASE ORAL at 20:52

## 2024-07-18 RX ADMIN — HYDROCODONE BITARTRATE AND ACETAMINOPHEN 1 TABLET: 7.5; 325 TABLET ORAL at 20:19

## 2024-07-18 RX ADMIN — Medication 10 ML: at 20:18

## 2024-07-18 RX ADMIN — DEXTROSE MONOHYDRATE 25 G: 25 INJECTION, SOLUTION INTRAVENOUS at 03:01

## 2024-07-18 RX ADMIN — ENOXAPARIN SODIUM 40 MG: 100 INJECTION SUBCUTANEOUS at 09:19

## 2024-07-18 RX ADMIN — POTASSIUM CHLORIDE 20 MEQ: 20 SOLUTION ORAL at 20:52

## 2024-07-18 RX ADMIN — METOPROLOL TARTRATE 5 MG: 5 INJECTION INTRAVENOUS at 09:19

## 2024-07-18 RX ADMIN — MINERAL SUPPLEMENT IRON 300 MG / 5 ML STRENGTH LIQUID 100 PER BOX UNFLAVORED 300 MG: at 20:54

## 2024-07-18 RX ADMIN — FAMOTIDINE 20 MG: 10 INJECTION INTRAVENOUS at 05:40

## 2024-07-18 RX ADMIN — DOCUSATE SODIUM 50 MG AND SENNOSIDES 8.6 MG 2 TABLET: 8.6; 5 TABLET, FILM COATED ORAL at 20:51

## 2024-07-18 RX ADMIN — KETOROLAC TROMETHAMINE 15 MG: 15 INJECTION, SOLUTION INTRAMUSCULAR; INTRAVENOUS at 02:09

## 2024-07-18 RX ADMIN — BISACODYL 10 MG: 10 SUPPOSITORY RECTAL at 09:20

## 2024-07-18 RX ADMIN — METOPROLOL TARTRATE 5 MG: 5 INJECTION INTRAVENOUS at 02:09

## 2024-07-18 NOTE — PLAN OF CARE
Goal Outcome Evaluation:  Plan of Care Reviewed With: patient        Progress: no change  Outcome Evaluation: Pt unable to respond to assess for orientation. Tube feeding resumed per orders. ENT assessed pt. Trach is no longer patent and has been removed. Pt on room air. Safety maintained.

## 2024-07-18 NOTE — CONSULTS
PICC line is not an option in this patient due to contractures.  Left arm so limited by contractures that unable to even move away from body to assess for any iv access and right arm extremely limited in areas to evaluate for possible access. Also assessed left lower extremity for possible midthigh placement and unable to visualize vein for placement. Unable to assess right leg due to contractures.  20 gauge 2.5 inch USGPIV placed in right upper arm with 1 attempt(s).  If patient requires central access, will have to be placed via subclavian or jugular approach.  Discussed with patient's nurse, Francisco, and BERT Mariano.

## 2024-07-18 NOTE — PROGRESS NOTES
[facility] Palliative Care Services  Progress Note  Patient Name: Namita Zabala  Date of Admission: 7/14/2024  Today's Date: 07/18/24     Code Status and Medical Interventions:   Ordered at: 07/15/24 0243     Medical Intervention Limits:    No intubation (DNI)    No cardioversion     Code Status (Patient has no pulse and is not breathing):    No CPR (Do Not Attempt to Resuscitate)     Medical Interventions (Patient has pulse or is breathing):    Limited Support     Subjective   Chief complaint/Reason for Referral/Visit: Follow up on Goals of Care/Advance Care Planning.    Medical record reviewed. Events noted.  CT of abdomen and pelvis noted ball of stool at the rectum which now measures 6 cm previously 8.5 cm on 7/15/2024 however no acute intra-abdominal finding per radiology report.  Chest x-ray completed this morning revealed tracheostomy tube appeared retracted and no longer projecting over the trachea.  ENT has evaluated and noted tracheostomy was off-center and did not have appear to have airflow through it and tracheostomy tube removed.  Noted discussions regarding whether or not family would wish for tracheostomy tube to be replaced in the future.  She is lying in bed, eyes open and in no apparent distress.  Unable to follow commands.  Her parents are at bedside.     Advance Care Planning     Advanced Directives: No advance directive on file.     Advance Care Discussion: Ms. Zabala remains unable to demonstrate ability to make complex medical decisions.  Spoke with her parents, Marquita and Noel, further at bedside regarding goals of care and treatment options.  They reflected on discussions with other providers and were pleased that tracheostomy was removed.  Shared they have been wondering if this was even still indicated although report they discussed with SNF and reportedly recommended to continue.  We discussed treatment options further including potential for respiratory distress  "and treatment options.  Expressed they would be okay with her receiving oxygen support through noninvasive means however would not wish for her to be placed back on ventilator support.  Briefly discussed potential scenarios and they shared if she were to experience respiratory distress and further decline would rather focus on keeping her comfortable rather than undergoing aggressive interventions.  We also discussed comfort measures further including details and expectations.  They had many questions regarding hospice services including discharge options.  We discussed expectations of hospice and potential return to a SNF with hospice.  Parents are hopeful a different nursing facility will be able to accept her when she is ready to be discharged.  After further discussion they are not quite ready to transition to comfort measures and/or hospice care however expressed multiple times that do not want to put her through uncomfortable/aggressive measures.  They appear to have good understanding of poor long-term prognosis and appear realistic while remaining hopeful for \"a miracle\" without placing her through extensive interventions.  They were appreciative of discussion.  Wish to monitor to see how she does without tracheostomy before making further decisions.  Denied any questions and or concerns at this time.  Support provided.  Discussed with nursing.    The patient receives support from her parents. Patient's parents are her next of kin.  Goals of care: Ongoing.    Review of Systems   Unable to perform ROS: Patient nonverbal     Pain Assessment  CPOT and PAINAD Scales: PAINAD (Pain Assessment in Advance Dementia Scale)  PAINAD Breathin-->occasional labored breathing, short period of hyperventilation  PAINAD Negative Vocalization: 1-->occasional moan/groan, low speech, negative/disapproving quality  PAINAD Facial Expression: 1-->sad, frightened, frown  PAINAD Body Language: 1-->tense, distressed pacing, " fidgeting  PAINAD Consolability: 1-->distracted or reassured by voice/touch  PAINAD Score: 5  Objective   Diagnostics: Reviewed      Intake/Output Summary (Last 24 hours) at 7/18/2024 1517  Last data filed at 7/18/2024 0621  Gross per 24 hour   Intake 1050 ml   Output --   Net 1050 ml     Current Facility-Administered Medications   Medication Dose Route Frequency Provider Last Rate Last Admin    acetaminophen (TYLENOL) tablet 650 mg  650 mg Per G Tube Q4H PRN David Hernández MD   650 mg at 07/16/24 1602    Or    acetaminophen (TYLENOL) 160 MG/5ML oral solution 650 mg  650 mg Per G Tube Q4H PRN David Hernández MD        Or    acetaminophen (TYLENOL) suppository 650 mg  650 mg Rectal Q4H PRN David Hernández MD   650 mg at 07/17/24 1630    artificial tears ophthalmic ointment   Both Eyes Q1H PRN Montserrat Feliciano APRN        baclofen (LIORESAL) tablet 40 mg  40 mg Per G Tube TID Montserrat Feliciano APRN   40 mg at 07/16/24 2122    bisacodyl (DULCOLAX) suppository 10 mg  10 mg Rectal Daily Montserrat Feliciano APRN   10 mg at 07/18/24 1319    cefTRIAXone (ROCEPHIN) 2,000 mg in sodium chloride 0.9 % 100 mL MBP  2,000 mg Intravenous Q24H Montserrat Feliciano APRN 200 mL/hr at 07/17/24 2022 2,000 mg at 07/17/24 2022    cetirizine (zyrTEC) tablet 10 mg  10 mg Per G Tube Daily Montserrat Feliciano APRN   10 mg at 07/16/24 0819    chlorhexidine (PERIDEX) 0.12 % solution 15 mL  15 mL Mouth/Throat BID Montserrat Feliciano APRN   15 mL at 07/18/24 0919    dextrose (D50W) (25 g/50 mL) IV injection 25 g  25 g Intravenous Q15 Min PRN Wayne Pimentel, DO   25 g at 07/18/24 0301    dextrose (GLUTOSE) oral gel 15 g  15 g Oral Q15 Min PRN Wayne Pimentel DO        docusate (COLACE) 50 MG/5ML liquid 150 mg  150 mg Per G Tube BID Montserrat APRN   150 mg at 07/16/24 2122    Enoxaparin Sodium (LOVENOX) syringe 40 mg  40 mg Subcutaneous Daily David Hernández MD   40 mg at 07/18/24 0919    Ferrous Sulfate  300 (60 Fe) MG/5ML solution 300 mg  300 mg Per G Tube BID Montserrat Feliciano APRN   300 mg at 07/16/24 2122    fleet enema 1 enema  1 enema Rectal Q48H PRN David Hernández MD        fluticasone (FLONASE) 50 MCG/ACT nasal spray 2 spray  2 spray Nasal QAM Montserrat Feliciano APRN   2 spray at 07/18/24 0542    glucagon (GLUCAGEN) injection 1 mg  1 mg Intramuscular Q15 Min PRN Wayne Pimentel DO        guaifenesin (ROBITUSSIN) 100 MG/5ML liquid 600 mg  600 mg Per G Tube BID Montserrat Feliciano APRN   600 mg at 07/16/24 2122    HYDROcodone-acetaminophen (NORCO) 7.5-325 MG per tablet 1 tablet  1 tablet Oral Q4H PRN Tae Charles MD   1 tablet at 07/17/24 0513    insulin regular (humuLIN R,novoLIN R) injection 2-7 Units  2-7 Units Subcutaneous Q6H Wayne Pimentel DO        ipratropium-albuterol (DUO-NEB) nebulizer solution 3 mL  3 mL Nebulization Q4H PRN David Hernández MD        lactated ringers infusion  75 mL/hr Intravenous Continuous Darrell De La Torre DO 75 mL/hr at 07/18/24 1202 75 mL/hr at 07/18/24 1202    metoprolol tartrate (LOPRESSOR) tablet 50 mg  50 mg Per G Tube BID Montserrat Feliciano APRN   50 mg at 07/16/24 2122    metroNIDAZOLE (FLAGYL) IVPB 500 mg  500 mg Intravenous Q8H Darrell De La Torre  mL/hr at 07/18/24 1357 500 mg at 07/18/24 1357    Morphine sulfate (PF) injection 1 mg  1 mg Intravenous Q4H PRN Wayne Pimentel DO        nystatin (MYCOSTATIN) powder 1 Application  1 Application Topical BID Montserrat Feliciano APRN   1 Application at 07/18/24 0920    ondansetron (ZOFRAN) 4 MG/5ML oral solution 4 mg  4 mg Oral Q6H PRN Montserrat Feliciano APRN        ondansetron (ZOFRAN) injection 4 mg  4 mg Intravenous Q6H PRN David Hernández MD        pantoprazole (PROTONIX) EC tablet 40 mg  40 mg Oral BID Montserrat Feliciano APRN   40 mg at 07/16/24 2122    polyethylene glycol (MIRALAX) packet 17 g  17 g Per G Tube Daily Montserrat Feliciano APRN   17 g at 07/16/24 0819    potassium  chloride (KAYCIEL) 20 mEq/15 mL solution 20 mEq  20 mEq Per G Tube BID Montserrat Feliciano APRN   20 mEq at 07/16/24 2122    rosuvastatin (CRESTOR) tablet 5 mg  5 mg Per G Tube Nightly Montserrat Feliciano APRN   5 mg at 07/16/24 2122    saccharomyces boulardii (FLORASTOR) capsule 250 mg  250 mg Per G Tube Daily Montserrat Feliciano APRN   250 mg at 07/16/24 1602    scopolamine patch 1 mg/72 hr  1 patch Transdermal Q72H Montserrat Feliciano APRN   1 patch at 07/18/24 1358    sennosides-docusate (PERICOLACE) 8.6-50 MG per tablet 2 tablet  2 tablet Oral BID Montserrat Feliciano APRN   2 tablet at 07/16/24 0819    simethicone (MYLICON) 40 MG/0.6ML drops 20 mg  20 mg Per G Tube Q6H Montserrat Feliciano APRN   20 mg at 07/17/24 0254    sodium chloride 0.9 % flush 10 mL  10 mL Intravenous Q12H David Hernández MD   10 mL at 07/18/24 0920    sodium chloride 0.9 % flush 10 mL  10 mL Intravenous PRN David Hernández MD        sodium chloride 0.9 % infusion 40 mL  40 mL Intravenous PRN David Hernández MD        sodium chloride nasal spray 2 spray  2 spray Each Nare PRN Montserrat Feliciano APRN        thiamine (VITAMIN B-1) tablet 100 mg  100 mg Per G Tube Daily Montserrat Feliciano APRN   100 mg at 07/16/24 1602    tiZANidine (ZANAFLEX) tablet 4 mg  4 mg Per G Tube Q8H Montserrat Feliciano APRN   4 mg at 07/18/24 1356    zinc oxide 20 % ointment   Topical Q4H PRN Montserrat Feliciano APRN         lactated ringers, 75 mL/hr, Last Rate: 75 mL/hr (07/18/24 1202)        acetaminophen **OR** acetaminophen **OR** acetaminophen    artificial tears    dextrose    dextrose    fleet enema    glucagon (human recombinant)    HYDROcodone-acetaminophen    ipratropium-albuterol    Morphine    ondansetron    ondansetron    sodium chloride    sodium chloride    sodium chloride    zinc oxide  Current medications patient is presently taking including all prescriptions, over-the-counter, herbals and vitamin/mineral/dietary (nutritional) supplements  "with reviewed including route, type, dose and frequency and are current per MAR at time of dictation.    Assessment:  Vital Signs: /72 (BP Location: Right leg, Patient Position: Lying)   Pulse 75   Temp 98.8 °F (37.1 °C) (Rectal)   Resp 16   Ht 160 cm (63\")   Wt 59.4 kg (131 lb)   LMP  (LMP Unknown)   SpO2 98%   BMI 23.21 kg/m²     Physical Exam  Vitals and nursing note reviewed.   Constitutional:       General: She is awake. She is not in acute distress.     Appearance: She is ill-appearing.   HENT:      Head: Normocephalic.   Eyes:      General: Lids are normal.   Neck:      Comments: Dressing over tracheostomy site. CDI.   Cardiovascular:      Rate and Rhythm: Normal rate.   Pulmonary:      Effort: Pulmonary effort is normal.   Abdominal:      Palpations: Abdomen is soft.      Comments: PEG tube present.    Genitourinary:     Comments: Indwelling urinary catheter present.  Musculoskeletal:      Comments: Contractures of all extremities.    Skin:     General: Skin is warm and dry.   Psychiatric:         Speech: She is noncommunicative.     Functional status: Palliative Performance Scale Score: Performance 10% based on the following measures: Ambulation: Totally bed bound, Activity and Evidence of Disease: Unable to do any work, extensive evidence of disease, Self-Care: Total care required,  Intake: Mouth care only, LOC: Drowsy or comatose.  Nutritional status: Albumin 3.7. Body mass index is 23.21 kg/m².   Patient status: Disease state: Controlled with current treatments.    Active Hospital Problems    Diagnosis     **Sepsis secondary to UTI     High anion gap metabolic acidosis     Constipation     Chronic indwelling Mojica catheter     Tracheostomy malfunction     Presence of externally removable percutaneous endoscopic gastrostomy (PEG) tube     History of anoxic brain injury     Functional quadriplegia     Tracheostomy present        Impression/Problem List:  Sepsis secondary to urinary tract " "infection  History of anoxic brain injury  Ileus  Functional quadriplegia / Contractures / Bedbound / Impaired mobility and ADLs  Tracheostomy present  Kidney stones  Neurogenic bladder with chronic indwelling stratton catheter   Presence of PEG tube    Plan / Recommendations     Palliative Care Encounter   Goals of care include CODE STATUS NO CPR with limited support interventions.    Prognosis is poor long-term secondary to sepsis due to UTI, anoxic brain injury, functional quadriplegia, presence of tracheostomy and PEG tube, ileus and other comorbidities listed above.      Ms. Zabala's parents, Noel and Marquita, are her next of kin as her spouse recently passed away and she has no children.       Ms. Zabala remains unable to demonstrate ability to make complex medical decisions.  Spoke with her parents, Marquita and Noel, further at bedside regarding goals of care and treatment options.  Details of discussion above.      Parents are pleased that tracheostomy was removed and are hopeful she is able to oxygenate well without it.       Discussed concerns for potential decline and treatment options.  Parents both expressed they would be okay with her receiving oxygen support through noninvasive means however would not wish for her to be placed back on ventilator support.      Briefly discussed potential scenarios and they shared if she were to experience respiratory distress and further decline would rather focus on keeping her comfortable rather than undergoing aggressive interventions.      Comfort measures discussed further including details and expectations.  Not quite ready to make transition at this time however do not wish to put her through \"aggressive interventions.\"    Hospice services also discussed further.      They appear to have good understanding of poor long-term prognosis and appear realistic while remaining hopeful for \"a miracle\" without placing her through extensive interventions.       Parents " wish to monitor to see how she does without tracheostomy before making further decisions.       Denied any questions and or concerns at this time.  Support provided.  Discussed with nursing.     Thank you for allowing us to participate in patient's plan of care. Palliative Care Team will continue to follow patient.    Time spent:63 minutes spent reviewing medical and medication records, assessing and examining patient, discussing with family, answering questions, providing some guidance about a plan and documentation of care, and coordinating care with other healthcare members, with > 50% time spent face to face.   28 minutes spent on advance care planning.     Electronically signed by, BERT White, 07/18/24.

## 2024-07-18 NOTE — DISCHARGE PLACEMENT REQUEST
"Namita Cooper N (49 y.o. Female)       Date of Birth   1975    Social Security Number       Address   [address] [place], [place] [address]    Home Phone   [phone]    MRN   [id number]       [facility]    Marital Status                               Admission Date   7/14/24    Admission Type   Emergency    Admitting Provider   Darrell De La Torre DO    Attending Provider   Darrell De La Torre DO    Department, Room/Bed   [facility] 3C, 385/1       Discharge Date       Discharge Disposition       Discharge Destination                                 Attending Provider: Darrell De La Torre DO    Allergies: Contrast Dye (Echo Or Unknown Ct/mr), Ct Contrast, Coconut, Levaquin [Levofloxacin], Nuts, Penicillins, [place]    Isolation: Contact   Infection: ESBL Klebsiella (04/07/22), ESBL E coli (10/13/23), CR Pseudomonas CRPA (04/22/24), Candida Auris (rule out) (07/15/24)   Code Status: No CPR    Ht: 160 cm (63\")   Wt: 59.4 kg (131 lb)    Admission Cmt: None   Principal Problem: Sepsis secondary to UTI [A41.9,N39.0]                   Active Insurance as of 7/14/2024       Primary Coverage       Payor Plan Insurance Group Employer/Plan Group    MEDICARE MEDICARE A & B        Payor Plan Address Payor Plan Phone Number Payor Plan Fax Number Effective Dates    [address] [phone]  7/1/2019 - None Entered    [facility] [id number]         Subscriber Name Subscriber Birth Date Member ID       NAMITA COOPER N 1975 [id number]               Secondary Coverage       Payor Plan Insurance Group Employer/Plan Group    [place]        Payor Plan Address Payor Plan Phone Number Payor Plan Fax Number Effective Dates    [address] [phone]  9/15/2023 - None Entered    [place] [address]         Subscriber Name Subscriber Birth Date Member ID       NAMITA COOPER N 1975 [id number]                     Emergency Contacts        " (Rel.) Home Phone Work Phone Mobile Phone    Noel Johnson (Father) [phone] -- [phone]    Marquita Johnson (Mother) -- -- [phone]    Neida Zabala (Relative) -- -- [phone]                 History & Physical        David Hernández MD at 07/15/24 0140              [facility]  HISTORY AND PHYSICAL    Date of Admission: 7/14/2024  Primary Care Physician: David Lara MD    Subjective   Primary Historian: ER attending    Chief Complaint: Transfer from [facility] with reports of clogged PEG tube and displaced tracheostomy    History of Present Illness  49-year-old female with past medical history of anoxic brain damage, tracheostomy, PEG tube, chronic respiratory failure, pulmonary embolism, recurrent UTI, staghorn renal calculus, the presents to the emergency room with fever, tachycardia and hypotension.  Blood work shows WBC of 18.8 and urinalysis with turbid urine blood, nitrate, leukocytes and bacteria.  She has history of nephrostomy and has CT abdomen was obtained in the ER apparently this has has been removed.  She followed at [place].    Review of Systems   Otherwise complete ROS reviewed and negative except as mentioned in the HPI.    Past Medical History:   Past Medical History:   Diagnosis Date    Acute kidney failure, unspecified     Angina at rest     Anoxic brain damage, not elsewhere classified     Chronic pulmonary embolism     Chronic respiratory failure, unspecified whether with hypoxia or hypercapnia     Dependence on respirator (ventilator) status     Gastrointestinal hemorrhage, unspecified     Hypercalcemia     Iron deficiency anemia     Metabolic encephalopathy     Migraine     Sees Pain Management for injections.     MVP (mitral valve prolapse)     Other dysphagia     Other pulmonary embolism with acute cor pulmonale     Renal disorder     Ruptured bladder     Syncope     Urinary tract  infection, site not specified      Past Surgical History:  Past Surgical History:   Procedure Laterality Date    ABDOMINAL SURGERY      BREAST BIOPSY Right 2011    benign    GTUBE INSERTION      INSERTION HEMODIALYSIS CATHETER Left 09/16/2021    Procedure: HEMODIALYSIS CATHETER INSERTION;  Surgeon: Anders Kaur MD;  Location: [place] [address];  Service: Vascular;  Laterality: Left;    TONSILLECTOMY      TRACHEOSTOMY       Social History:  reports that she has never smoked. She has never used smokeless tobacco. She reports that she does not drink alcohol and does not use drugs.    Family History: family history includes Colon cancer (age of onset: 52) in her maternal aunt; Fibromyalgia in her mother; Heart disease in her mother; Hodgkin's lymphoma in her paternal grandmother; Hypertension in her mother.       Allergies:  Allergies   Allergen Reactions    Coconut Unknown - High Severity    Levaquin [Levofloxacin] Other (See Comments)     unknown    Nuts Unknown - High Severity    Penicillins Rash     Patient's father noted patient had taken penicillin, many years ago, many times and then started developing a rash when she would take it.  She has received cefepime, ceftriaxone and cephalexin with no known adverse problems    Turkey Other (See Comments)     Causes migraines per pt reports       Medications:  Prior to Admission medications    Medication Sig Start Date End Date Taking? Authorizing Provider   acetaminophen (TYLENOL) 500 MG tablet Administer 1 tablet per G tube Every 4 (Four) Hours As Needed for Fever. Not to exceed 3000mg from all sources daily    ProviderOdalys MD   baclofen (LIORESAL) 20 MG tablet Administer 0.5 tablets per G tube 3 (Three) Times a Day. 4/8/24   Mik Meeks,    bisacodyl (DULCOLAX) 10 MG suppository Insert 1 suppository into the rectum Every Other Day As Needed for Constipation.    ProviderOdalys MD   carboxymethylcellulose (REFRESH PLUS) 0.5 %  solution Administer 1 drop to both eyes 2 (Two) Times a Day.    Odalys Mullen MD   cetirizine (zyrTEC) 10 MG tablet Take 1 tablet by mouth Daily.    Odalys Mullen MD   chlorhexidine (PERIDEX) 0.12 % solution Apply 15 mL to the mouth or throat 2 (Two) Times a Day. 15 ml using oral swab twice daily for oral care    Odalys Mullen MD   Citric Bx-Xvrxtnbbebj-Ag Carb (Renacidin) solution Irrigate with 60 mL to the affected area as directed by provider 2 (Two) Times a Day. Irrigate stratton catheter    Odalys Mullen MD   fluticasone (FLONASE) 50 MCG/ACT nasal spray 2 sprays into the nostril(s) as directed by provider Every Morning.    Odalys Mullen MD   guaifenesin (ROBITUSSIN) 100 MG/5ML liquid Administer 30 mL per G tube 2 (Two) Times a Day.    Odalys Mullen MD   hydrOXYzine pamoate (VISTARIL) 25 MG capsule Administer 1 capsule per G tube Every 6 (Six) Hours As Needed for Itching.    Odalys Mullen MD   ipratropium-albuterol (DUO-NEB) 0.5-2.5 mg/3 ml nebulizer Take 3 mL by nebulization Every 4 (Four) Hours As Needed for Wheezing.    Odalys Mullen MD   liver oil-zinc oxide (DESITIN) 40 % ointment Apply 1 application  topically to the appropriate area as directed Every 4 (Four) Hours As Needed for Irritation.  Patient taking differently: Apply 1 Application topically to the appropriate area as directed Every 4 (Four) Hours As Needed for Irritation. To perirectal and groin area after any stool incontinence 12/4/23   Amado Villarreal MD   miconazole (MICOTIN) 2 % powder Apply 1 Application topically to the appropriate area as directed 2 (Two) Times a Day As Needed for Itching. Apply to groin or gluteal folds for rash, itching, redness and/or irritation    Odalys Mullen MD   multivitamin with minerals tablet tablet Take 1 tablet by mouth Every Morning.    Odalys Mullen MD   nystatin (MYCOSTATIN) 058199 UNIT/GM powder Apply 1 Application topically to  the appropriate area as directed 2 (Two) Times a Day. To bilateral bendarms, left abdominal fold, and under left breast    Odalys Mullen MD   ondansetron (ZOFRAN) 4 MG/5ML solution Take 5 mL by mouth Every 6 (Six) Hours As Needed for Nausea or Vomiting.    Odalys Mullen MD   pantoprazole (PROTONIX) 2 mg/mL suspension suspension Administer 20 mL per G tube 2 (Two) Times a Day.    Odalys Mullen MD   polyethylene glycol (MiraLax) 17 g packet Take 17 g by mouth Daily.    Odalys Mullen MD   rosuvastatin (CRESTOR) 5 MG tablet Administer 1 tablet per G tube Every Night.    Odalys Mullen MD   Scopolamine 1 MG/3DAYS patch Place 1 patch on the skin as directed by provider Every 72 (Seventy-Two) Hours.    Odalys Mullen MD   sennosides-docusate (Senna Plus) 8.6-50 MG per tablet Take 2 tablets by mouth 2 (Two) Times a Day.    Odalys Mullen MD   simethicone (MYLICON) 40 MG/0.6ML drops Administer 0.3 mL per G tube Every 6 (Six) Hours.    Odalys Mullen MD   sodium chloride (NS) 0.9 % irrigation Irrigate with 10 mL to the affected area as directed by provider Every 8 (Eight) Hours. Irrigation for secretions: Hydration    Odalys Mullen MD   Sodium Phosphates (fleet enema) 7-19 GM/118ML enema Insert 1 enema into the rectum Every Other Day As Needed (bowel management).    Odalys Mullen MD     I have utilized all available immediate resources to obtain, update, or review the patient's current medications (including all prescriptions, over-the-counter products, herbals, cannabis/cannabidiol products, and vitamin/mineral/dietary (nutritional) supplements).    Objective     Vital Signs: /61   Pulse 108   Temp (!) 101.7 °F (38.7 °C) (Rectal)   Resp 24   Wt 59 kg (130 lb 1.1 oz)   LMP  (LMP Unknown)   SpO2 94%   BMI 23.04 kg/m²   Physical Exam  Vitals reviewed.   Constitutional:       General: She is not in acute distress.     Appearance: She is  well-developed. She is ill-appearing, toxic-appearing and diaphoretic.   HENT:      Head: Normocephalic and atraumatic.      Right Ear: External ear normal.      Left Ear: External ear normal.      Mouth/Throat:      Mouth: Mucous membranes are dry.      Pharynx: Oropharynx is clear.   Eyes:      General:         Right eye: No discharge.         Left eye: No discharge.      Extraocular Movements: Extraocular movements intact.      Conjunctiva/sclera: Conjunctivae normal.      Pupils: Pupils are equal, round, and reactive to light.   Neck:      Vascular: No JVD.   Cardiovascular:      Rate and Rhythm: Regular rhythm. Tachycardia present.      Pulses: Normal pulses.      Heart sounds: Normal heart sounds. No murmur heard.     No friction rub. No gallop.   Pulmonary:      Effort: Pulmonary effort is normal. No respiratory distress.      Breath sounds: No stridor. Rales present. No wheezing or rhonchi.   Chest:      Chest wall: No tenderness.   Abdominal:      General: Bowel sounds are normal. There is no distension.      Palpations: Abdomen is soft.      Tenderness: There is no abdominal tenderness. There is no guarding or rebound.      Hernia: No hernia is present.   Musculoskeletal:         General: No swelling, tenderness or deformity. Normal range of motion.      Cervical back: Normal range of motion and neck supple. No rigidity or tenderness. No muscular tenderness.      Right lower leg: No edema.      Left lower leg: No edema.   Skin:     General: Skin is warm and dry.      Findings: No erythema or rash.   Neurological:      Mental Status: She is alert.      Cranial Nerves: No cranial nerve deficit.      Motor: Weakness present. No abnormal muscle tone.      Deep Tendon Reflexes: Reflexes normal.      Comments: Bedbound nonverbal female with contracture in 4 extremities.  She opens eyes.  Does not follow commands.     Results Reviewed:  Lab Results (last 24 hours)       Procedure Component Value Units Date/Time     Lactic Acid, Plasma [[id number]]  (Normal) Collected: 07/14/24 2303    Specimen: Blood from Hand, Left Updated: 07/14/24 2323     Lactate 1.1 mmol/L     COVID PRE-OP / PRE-PROCEDURE SCREENING ORDER (NO ISOLATION) - Swab, Nasal Cavity [[id number]]  (Normal) Collected: 07/14/24 2213    Specimen: Swab from Nasal Cavity Updated: 07/14/24 2320    Narrative:      The following orders were created for panel order COVID PRE-OP / PRE-PROCEDURE SCREENING ORDER (NO ISOLATION) - Swab, Nasal Cavity.  Procedure                               Abnormality         Status                     ---------                               -----------         ------                     COVID-19 and FLU A/B PCR...[[id number]]  Normal              Final result                 Please view results for these tests on the individual orders.    COVID-19 and FLU A/B PCR, 1 HR TAT - Swab, Nasopharynx [[id number]]  (Normal) Collected: 07/14/24 2213    Specimen: Swab from Nasopharynx Updated: 07/14/24 2320     COVID19 Not Detected     Influenza A PCR Not Detected     Influenza B PCR Not Detected    Narrative:      Fact sheet for providers: https://www.fda.gov/media/788614/download    Fact sheet for patients: https://www.fda.gov/media/244286/download    Test performed by PCR.    Comprehensive Metabolic Panel [[id number]]  (Abnormal) Collected: 07/14/24 2231    Specimen: Blood Updated: 07/14/24 2310     Glucose 141 mg/dL      BUN 29 mg/dL      Creatinine 0.46 mg/dL      Sodium 141 mmol/L      Potassium 4.5 mmol/L      Comment: Slight hemolysis detected by analyzer. Result may be falsely elevated.        Chloride 104 mmol/L      CO2 26.0 mmol/L      Calcium 10.1 mg/dL      Total Protein 7.7 g/dL      Albumin 3.7 g/dL      ALT (SGPT) 14 U/L      AST (SGOT) 14 U/L      Comment: Slight hemolysis detected by analyzer. Result may be falsely elevated.        Alkaline Phosphatase 84 U/L      Total Bilirubin 0.7 mg/dL      Globulin 4.0 gm/dL      A/G Ratio 0.9 g/dL       BUN/Creatinine Ratio 63.0     Anion Gap 11.0 mmol/L      eGFR 118.9 mL/min/1.73     Narrative:      GFR Normal >60  Chronic Kidney Disease <60  Kidney Failure <15      Blood Culture - Blood, Hand, Left [[id number]] Collected: 07/14/24 2303    Specimen: Blood from Hand, Left Updated: 07/14/24 2308    Blood Culture - Blood, Hand, Right [[id number]] Collected: 07/14/24 2302    Specimen: Blood from Hand, Right Updated: 07/14/24 2306    Urinalysis With Culture If Indicated - Indwelling Urethral Catheter [[id number]]  (Abnormal) Collected: 07/14/24 2215    Specimen: Urine from Indwelling Urethral Catheter Updated: 07/14/24 2244     Color, UA Dark Yellow     Appearance, UA Turbid     pH, UA >=9.0     Specific Gravity, UA 1.017     Glucose, UA Negative     Ketones, UA Negative     Bilirubin, UA Negative     Blood, UA Moderate (2+)     Protein, UA >=300 mg/dL (3+)     Leuk Esterase, UA Large (3+)     Nitrite, UA Positive     Urobilinogen, UA 1.0 E.U./dL    Narrative:      In absence of clinical symptoms, the presence of pyuria, bacteria, and/or nitrites on the urinalysis result does not correlate with infection.    Urinalysis, Microscopic Only - Indwelling Urethral Catheter [[id number]]  (Abnormal) Collected: 07/14/24 2215    Specimen: Urine from Indwelling Urethral Catheter Updated: 07/14/24 2244     RBC, UA 6-10 /HPF      WBC, UA 11-20 /HPF      Bacteria, UA 4+ /HPF      Squamous Epithelial Cells, UA None Seen /HPF      Hyaline Casts, UA 7-12 /LPF      Coarse Granular Casts, UA 3-6 /LPF      Triple Phosphate Crystals, UA Moderate/2+ /HPF      Methodology Manual Light Microscopy    Urine Culture - Urine, Indwelling Urethral Catheter [[id number]] Collected: 07/14/24 2215    Specimen: Urine from Indwelling Urethral Catheter Updated: 07/14/24 2244    CBC & Differential [[id number]]  (Abnormal) Collected: 07/14/24 2231    Specimen: Blood Updated: 07/14/24 2242    Narrative:      The following orders were created for panel order  CBC & Differential.  Procedure                               Abnormality         Status                     ---------                               -----------         ------                     CBC Auto Differential[[id number]]        Abnormal            Final result                 Please view results for these tests on the individual orders.    CBC Auto Differential [[id number]]  (Abnormal) Collected: 07/14/24 2231    Specimen: Blood Updated: 07/14/24 2242     WBC 18.80 10*3/mm3      RBC 3.98 10*6/mm3      Hemoglobin 11.4 g/dL      Hematocrit 36.5 %      MCV 91.7 fL      MCH 28.6 pg      MCHC 31.2 g/dL      RDW 13.2 %      RDW-SD 44.5 fl      MPV 10.9 fL      Platelets 274 10*3/mm3      Neutrophil % 88.3 %      Lymphocyte % 5.5 %      Monocyte % 4.8 %      Eosinophil % 0.4 %      Basophil % 0.4 %      Immature Grans % 0.6 %      Neutrophils, Absolute 16.59 10*3/mm3      Lymphocytes, Absolute 1.04 10*3/mm3      Monocytes, Absolute 0.91 10*3/mm3      Eosinophils, Absolute 0.08 10*3/mm3      Basophils, Absolute 0.07 10*3/mm3      Immature Grans, Absolute 0.11 10*3/mm3      nRBC 0.0 /100 WBC           Imaging Results (Last 24 Hours)       Procedure Component Value Units Date/Time    CT Abdomen Pelvis With Contrast [[id number]] Resulted: 07/15/24 0014     Updated: 07/15/24 0032    XR Chest 1 View [[id number]] Resulted: 07/14/24 2222     Updated: 07/14/24 2223          I have personally reviewed and interpreted the radiology studies and ECG obtained at time of admission.     Assessment / Plan   Assessment:   Active Hospital Problems    Diagnosis     **Sepsis secondary to UTI     History of anoxic brain injury     Functional quadriplegia     Tracheostomy present     Ileus     Essential (primary) hypertension      Treatment Plan  The patient will be admitted to my service here at [facility].   Admit to med floor with remote telemetry  Vitals every 4 hours  Diet n.p.o.  IV fluids sepsis bolus given in the  emergency room continue with normal saline at 100 cc an hour.  Bolus normal saline.  MAP less than 60  Rocephin 2 g IV piggyback every 24 hours  Follow blood and urine cultures.  Prior cultures noted with different organisms including Pseudomonas in April urine culture, E. coli and Proteus in May urine culture which were resistant.  Unclear if these were contaminants these were treated outpatient.  Ins and outs Daily weights  Bedrest    Chronic respiratory failure with tracheostomy   trach collar in place  Oxygen per trach collar    DVT prophylaxis Lovenox 40 mg subcutaneous daily    Medical Decision Making  Number and Complexity of problems: 4 complex medical problems  Differential Diagnosis: see above    Conditions and Status        Condition is unchanged.     MDM Data  External documents reviewed: [employer] EHR  Cardiac tracing (EKG, telemetry) interpretation: Sinus rhythm   Radiology interpretation: See above  Labs reviewed: see above  Any tests that were considered but not ordered: none     Decision rules/scores evaluated (example [id number], Wells, etc): N/A     Discussed with: ER attending     Care Planning  Shared decision making: Noel Johnson   Code status and discussions: DNR per records    Disposition  Social Determinants of Health that impact treatment or disposition: none  Estimated length of stay is over 2 midnights.     I confirmed that the patient's advanced care plan is present, code status is documented, and a surrogate decision maker is listed in the patient's medical record.     The patient's surrogate decision maker is Noel Johnson, father.     The patient was seen and examined by me on 7/15/2024 at 0245 AM.    Electronically signed by David Hernández MD, 07/15/24, 06:19 CDT.              Electronically signed by David Hernández MD at 07/15/24 0619          Physician Progress Notes (last 48 hours)        , BERT Mariano at 07/18/24 9583          Patient Name: Namita ROBERTSON  Sagrario  Date of Admission: 7/14/2024  Today's Date: 07/18/24  Length of Stay: 3  Primary Care Physician: David Lara MD    Subjective   Chief Complaint: Clogged PEG tube and displaced tracheostomy.  NASEEM Zabala is a 49-year-old female with a history of anoxic brain damage, tracheostomy, PEG tube, chronic respiratory failure, pulmonary embolism, recurrent UTI, and staghorn renal calculus.  Patient was transferred per EMS from [facility] with complaints of a clogged PEG tube and displaced tracheostomy.  Upon assessment at [facility] emergency department the patient presented in a septic state with fever of 101.7, tachycardia at 121, WBC of 18.8, with obvious source of infection, as urinalysis revealed turbid urine with hematuria and nitrates, leukocytes, and 4+ bacteria.  Chronic Mojica catheter managed per outpatient urology at [facility], most recent appointment was 6/27/2024 where she was evaluated post nephrostomy tube being removed, where their recommendations were to observe rather than replace tube at that time.  ED workup revealed tracheostomy was not dislodged however it was extremely clogged with debris and dried secretions, when that was cleaned and replaced patient was able to utilize properly.  Additionally PEG tube was not dislodged, after cleaning debris and evaluating PEG tube it was in proper alignment.  CT scan revealed thickening in handedness on the mucosal urothelium of the collecting system bilaterally may represent acute or subacute inflammatory process/pyeloureteritis, no evidence of acute.  Stable nonobstructive bilateral staghorn calculi, and significant large volume stool in the distal colon with moderate gas distention of proximal colon.  Mildly dilated fluid filled small bowel loops may represent ileus or acute nonspecific enteritis.  No evidence of small bowel obstruction.  ED medications 1 L normal saline bolus, 650 mg Tylenol suppository,  and 1 g of Rocephin.  Previous urine culture 5/24 was positive for ESBL and Proteus Mirabilis.  Susceptible to ceftriaxone, 2 g continued.    Today: Rapid response overnight patient developed respiratory distress after given approximately 10 cc of oral contrast through her PEG tube.  Dr. Dan who responded to the rapid response, she was not sure if it was just a fluid overload issue versus a true contrast allergy as patient has tolerated contrast in the past.  Per Dr. Dan patient appeared to be struggling to catch her breath with current oxygenation at 9 L on trach collar and oxygen saturation was approximately 79 to 80%.  Patient was immediately given patient was given 25 mg IV Benadryl, 20 mg of IV Pepcid, 125 mg of Solu-Medrol and 0.5 mL of racemic epinephrine per trach.  She stated that the patient's oxygen saturation immediately responded with relaxation of her respiratory distress, back to baseline and oxygen saturation was 94%.  Initial ABGs during rapid response pH 7.2, pCO2 41.4, pO2 52.5, HCO3 of 19.6.  Patient responded well and was back to her baseline 9 L on her trach collar.  ABGs were repeated at 1050 this a.m. and patient again is compensated.  pH 7.4, pCO2 30.2, pO2 88.9, HCO3 of 19.1.    I spoke with CT technician who was able to perform her CT without IV contrast which revealed no acute intra-abdominal finding, including small bowel obstruction, and is retaining a large ball of stool at the rectum now measuring send 6 cm, previously 8.5 cm on 7/15/2024.  Orders placed for immediate milk molasses enema after suppository, will continue to follow.  Due to the positive curtaining CT results, patient's tube feeding was restarted per dietitian's recommendations and all p.o. meds were resumed.    Per chest x-ray this morning performed during rapid response, tracheostomy tube appears retracted compared to prior study, tip no longer projecting over the trachea.  Respiratory therapy unsuccessfully tried  to realign, ENT consult placed, awaiting recommendations     After the episode this morning I had a phone conversation with the patient's father Noel Johnson.  Again discussed the gravity of the patient's medical situation.  At that time I did not have her CT results but we discussed again the extensive problems, the continued infection, and the need to have serious discussions regarding palliative versus hospice care going forward.  All questions were answered to the best of my ability and he confirmed understanding, and he and his wife will be here this afternoon to discuss further with palliative care      Review of Systems   Constitutional:  Positive for diaphoresis and fever.   HENT:  Positive for congestion, drooling, rhinorrhea and trouble swallowing.    Eyes:  Positive for discharge. Negative for redness.   Respiratory:  Positive for cough and wheezing.    Cardiovascular:         Sinus tachycardia   Gastrointestinal:  Positive for constipation. Negative for abdominal distention, diarrhea and vomiting.   Endocrine: Negative for heat intolerance.   Genitourinary:         Chronic Mojica catheter, recent removal of nephrostomy tube   Musculoskeletal:         Patient is bedbound with chronic contractures of all extremities.   Skin:  Positive for pallor and wound.        Healing coccyx wound   Neurological:  Positive for facial asymmetry, speech difficulty and weakness.   Hematological:  Does not bruise/bleed easily.   Psychiatric/Behavioral:  Positive for confusion.       All pertinent negatives and positives are as above. All other systems have been reviewed and are negative unless otherwise stated.     Objective    Temp:  [97.5 °F (36.4 °C)-100 °F (37.8 °C)] 98.8 °F (37.1 °C)  Heart Rate:  [] 75  Resp:  [16-28] 16  BP: (112-149)/(63-93) 136/72  Physical Exam  Constitutional:       Appearance: She is cachectic. She is ill-appearing and diaphoretic.   HENT:      Head: Normocephalic.      Nose: Congestion  and rhinorrhea present.      Mouth/Throat:      Mouth: Mucous membranes are moist.      Pharynx: Oropharynx is clear.   Eyes:      Pupils: Pupils are equal, round, and reactive to light.   Neck:      Comments: Chronic contractures of neck  Cardiovascular:      Rate and Rhythm: Regular rhythm. Tachycardia present.      Pulses: Normal pulses.      Comments: S/ST , up to 144  Pulmonary:      Breath sounds: Wheezing and rhonchi present.      Comments: Trach collar at 9 L  Abdominal:      General: Bowel sounds are decreased. There is no distension.      Palpations: Abdomen is soft.      Tenderness: There is no abdominal tenderness.   Genitourinary:     Comments: Voiding per chronic Mojica catheter  Musculoskeletal:      Cervical back: Rigidity present.      Right lower leg: No edema.      Left lower leg: No edema.      Comments: Bedbound with chronic fractures of all extremities   Skin:     General: Skin is warm and moist.      Capillary Refill: Capillary refill takes less than 2 seconds.      Coloration: Skin is pale.   Neurological:      Mental Status: She is alert. She is disoriented.      Motor: Weakness, atrophy and abnormal muscle tone present.   Psychiatric:         Attention and Perception: She is inattentive.         Mood and Affect: Affect is flat.         Behavior: Behavior is slowed and withdrawn.         Cognition and Memory: Cognition is impaired.      Comments: Nonverbal at baseline       Results Review:  I have reviewed the labs, radiology results, and diagnostic studies.    Laboratory Data:   Results from last 7 days   Lab Units 07/18/24  0817 07/17/24  0411 07/16/24  0654   WBC 10*3/mm3 13.06* 10.74 13.60*   HEMOGLOBIN g/dL 12.5 10.7* 10.1*   HEMATOCRIT % 38.5 34.0 32.4*   PLATELETS 10*3/mm3 255 287 245        Results from last 7 days   Lab Units 07/18/24  0817 07/17/24  0411 07/16/24  0654 07/15/24  0600 07/14/24  2231   SODIUM mmol/L 137 140 141   < > 141   POTASSIUM mmol/L 4.1 4.1 4.3   < > 4.5    CHLORIDE mmol/L 102 107 107   < > 104   CO2 mmol/L 17.0* 21.0* 22.0   < > 26.0   BUN mg/dL 18 13 20   < > 29*   CREATININE mg/dL 0.40* 0.32* 0.34*   < > 0.46*   CALCIUM mg/dL 9.8 9.0 9.5   < > 10.1   BILIRUBIN mg/dL  --   --   --   --  0.7   ALK PHOS U/L  --   --   --   --  84   ALT (SGPT) U/L  --   --   --   --  14   AST (SGOT) U/L  --   --   --   --  14   GLUCOSE mg/dL 86 106* 109*   < > 141*    < > = values in this interval not displayed.     Urine Culture - Urine, Indwelling Urethral Catheter  Order: [id number] - Reflex for Order [id number]  Status: Final result       Visible to patient: Shannon (scheduled for 7/17/2024 12:06 PM)       Next appt: None    Specimen Information: Indwelling Urethral Catheter; Urine   0 Result Notes  Urine Culture >100,000 CFU/mL Proteus mirabilis Abnormal          Colonization of the urinary tract without infection is common. Treatment is discouraged unless the patient is symptomatic, pregnant, or undergoing an invasive urologic procedure.        Resulting Agency: [facility]     Susceptibility     Proteus mirabilis     SANDEEP     Amoxicillin + Clavulanate <=2 ug/ml Susceptible     Ampicillin <=2 ug/ml Susceptible     Ampicillin + Sulbactam <=2 ug/ml Susceptible     Cefazolin <=4 ug/ml Susceptible     Cefepime <=1 ug/ml Susceptible     Ceftazidime <=1 ug/ml Susceptible     Ceftriaxone <=1 ug/ml Susceptible     Gentamicin <=1 ug/ml Susceptible     Levofloxacin 4 ug/ml Resistant     Nitrofurantoin 128 ug/ml Resistant     Piperacillin + Tazobactam <=4 ug/ml Susceptible     Trimethoprim + Sulfamethoxazole <=20 ug/ml Susceptible               Linear View         Specimen Collected: 07/14/24 22:15 CDT Last Resulted: 07/17/24 11:06 CDT               Radiology Data:   Imaging Results (Last 24 Hours)       Procedure Component Value Units Date/Time    CT Abdomen Pelvis Without Contrast [[id number]] Collected: 07/18/24 1119     Updated: 07/18/24 1140    Narrative:      EXAM: CT ABDOMEN PELVIS WO  CONTRAST-      DATE: 7/18/2024 8:59 AM     HISTORY: SBO; N39.0-Urinary tract infection, site not specified;  A41.9-Sepsis, unspecified organism       COMPARISON: 7/15/2024.     DOSE LENGTH PRODUCT: 712.25 mGy.cm Automatic exposure control was  utilized to make radiation dose as low as reasonably achievable.     TECHNIQUE: Unenhanced axial images of the abdomen and pelvis obtained  with coronal and sagittal reformats.     FINDINGS: Evaluation limited secondary to lack of intravenous contrast  agent.     VISUALIZED CHEST: Similar patchy groundglass opacities at the lower  lungs, not optimally evaluated on this motion limited exam compared to  7/15/2024. No pleural or pericardial effusion.     LIVER: Normal hepatic contour.     BILIARY: No calcified gallstone. No intrahepatic or extrahepatic bile  duct dilation.      PANCREAS: Atrophic, otherwise within normal limits.     SPLEEN: Normal size and contour.      ADRENAL: Normal appearance of the bilateral adrenal glands.     GENITOURINARY:  There appear to be large bilateral renal calculi with a staghorn  configuration on the RIGHT. Motion partially limits evaluation, but  there appears to be thickening and prominent caliber of the RIGHT  greater than LEFT upper renal collecting systems.     Mojica catheter in a mildly thickened urinary bladder. Nondependent gas  in the urinary bladder, may be due to catheterization. Urinary bladder  wall appears mildly thickened, which may be due to under distention or  cystitis.     Probable atrophic uterus. Adnexa not discretely identified..     PERITONEUM: No free air or ascites.     GI TRACT: PEG tube with balloon appropriately positioned in the distal  stomach. Normal appearance of the stomach and duodenum. No abnormally  dilated loops of bowel or bowel wall thickening. Large amount of colonic  stool. There is contrast throughout the colon. Normal appendix on axial  series 3, images 61-55.     VESSELS: Aorta is normal in course and  caliber.     RETROPERITONEUM: No mass, lymphadenopathy or hemorrhage.     SOFT TISSUES: Broad-based LEFT groin hernia containing nondilated loops  of colon.     BONES: Sclerotic focus in the LEFT proximal femur, favored to represent  a bone island. Similar dysmorphic configuration of the pelvis and  thighs.          Impression:      1. No acute intra-abdominal finding. Broad-based LEFT groin hernia  containing nondilated loops of colon. There is enteric contrast in the  colon nearly to the rectum. Ball of stool at the rectum now measures 6  cm, previously 8.5 cm on 7/15/2024.     2. Thickened and prominent caliber of the RIGHT greater than LEFT upper  renal collecting system with a staghorn type calculi. Thickening could  be due to chronic inflammation, infection, or malignancy. Consider  direct visualization when clinically appropriate.     3. Urinary bladder appears mildly thickened, which could be due to under  distention or cystitis. Consider correlation with urinalysis.     4. Similar patchy groundglass opacities lower lungs, not optimally  evaluated on this motion limited exam.     5. Appropriate position of PEG tube.     This report was signed and finalized on 7/18/2024 11:37 AM by Dr Dea Barksdale MD.       XR Chest 1 View [[id number]] Collected: 07/18/24 0657     Updated: 07/18/24 0707    Narrative:      EXAM: XR CHEST 1 VW-      DATE: 7/18/2024 5:30 AM     HISTORY: trach; N39.0-Urinary tract infection, site not specified;  A41.9-Sepsis, unspecified organism       COMPARISON: 7/14/2024.     TECHNIQUE:  Single view. Frontal view of the chest. 1 images.       FINDINGS:    Limited evaluation due to patient positioning. Tracheostomy tube appears  retracted compared to prior.      No pneumothorax or pleural effusion. Patchy bibasilar opacities. Limited  evaluation of the RIGHT lung apex due to overlying structures.     Cardiac and mediastinal silhouette appear within normal limits. No acute  bony finding.      PEG tube at the partially visualized upper abdomen.          Impression:      1. Tracheostomy tube appears retracted compared to prior. Tip no longer  projecting over the trachea.  2. Streaky bibasilar opacities.     Result communicated by telephone to the patient's nurse Namita Velez at 7:03 AM on 7/19/2024.     This report was signed and finalized on 7/18/2024 7:04 AM by Dr Dea Barksdale MD.               I have reviewed the patient's current medications.     baclofen, 40 mg, Per G Tube, TID  bisacodyl, 10 mg, Rectal, Daily  cefTRIAXone, 2,000 mg, Intravenous, Q24H  cetirizine, 10 mg, Per G Tube, Daily  chlorhexidine, 15 mL, Mouth/Throat, BID  docusate, 150 mg, Per G Tube, BID  enoxaparin, 40 mg, Subcutaneous, Daily  Ferrous Sulfate, 300 mg, Per G Tube, BID  fluticasone, 2 spray, Nasal, QAM  guaifenesin, 600 mg, Per G Tube, BID  insulin regular, 2-7 Units, Subcutaneous, Q6H  metoprolol tartrate, 50 mg, Per G Tube, BID  metroNIDAZOLE, 500 mg, Intravenous, Q8H  nystatin, 1 Application, Topical, BID  pantoprazole, 40 mg, Oral, BID  polyethylene glycol, 17 g, Per G Tube, Daily  potassium chloride, 20 mEq, Per G Tube, BID  rosuvastatin, 5 mg, Per G Tube, Nightly  saccharomyces boulardii, 250 mg, Per G Tube, Daily  Scopolamine, 1 patch, Transdermal, Q72H  sennosides-docusate, 2 tablet, Oral, BID  simethicone, 20 mg, Per G Tube, Q6H  sodium chloride, 10 mL, Intravenous, Q12H  thiamine, 100 mg, Per G Tube, Daily  tiZANidine, 4 mg, Per G Tube, Q8H         Assessment/Plan   Assessment  Active Hospital Problems    Diagnosis     **Sepsis secondary to UTI     High anion gap metabolic acidosis     Constipation     Chronic indwelling Mojica catheter     Presence of externally removable percutaneous endoscopic gastrostomy (PEG) tube     History of anoxic brain injury     Functional quadriplegia     Tracheostomy present        Treatment Plan  1.  Sepsis secondary to UTI, with chronic Mojica catheter-7/14/2024 septic  state with fever of 101.7, tachycardia at 121, WBC of 18.8, with obvious source of infection, as urinalysis revealed turbid urine with hematuria and nitrates, leukocytes, and 4+ bacteria. Urine culture positive  for Proteus Mirabilis, continue 2 g ceftriaxone, therapy day 5.      2.  History of anoxic brain injury/functional quadriplegia-after overnight episode of respiratory distress, this a.m. patient is back to baseline.  Patient remains in primarily vegetative state.  Extensive discussion with patient's father this a.m. regarding palliative versus hospice care.  He and his wife will be this afternoon to discuss with palliative care.    3.  Tracheostomy present-currently patient's oxygen saturation is back at baseline on 9 L trach collar.  Per chest x-ray performed this morning during rapid response.  Tracheostomy tube appears retracted compared to prior study, tip no longer projecting over the trachea.  Respiratory therapy attempted to realign, they were unable to do so.  ENT consult placed.  Will continue to follow     4.  Obstipation-patient CT scan on admission revealed possible ileus or acute nonspecific enteritis.  Follow-up KUBs on 7/16/2024 and 7/17/2024 revealed continued ileus and progressing small bowel obstruction.  CT scan this morning was negative for acute abdominal findings.  Since p.o. meds and tube feeding resumed.  Milk and molasses enema performed this morning.  Continue to follow     5.  Presence of externally removable percutaneous endoscopic gastrostomy tube-after CT scan revealed no acute abdominal findings, tube feeding resumed per dietitian's recommendations.    VTE prophylaxis Lovenox 40 mg.  Labs in a.m.    Medical Decision Making  Number and Complexity of problems: 5  Differential Diagnosis: None    Conditions and Status        Condition is unchanged.     [facility] Data  External documents reviewed:   CODE STATUS-DNR/DNI per previous documentation  Cardiac tracing (EKG, telemetry)  interpretation: 7/18/2024 EKG interpretation is tachycardia, ventricular rate 111 atrial rate 111 QTc 435 MS.  Overnight telemetry S/ST 99-1 26.    Radiology interpretation:   7/18/2024 chest x-ray and CT of abdomen pelvis per radiology, reviewed   Labs reviewed:   7/18/2024 ABGs x 2, CBC, BMP, lactic acid reviewed, repeat in a.m.  Any tests that were considered but not ordered: None     Decision rules/scores evaluated (example [id number], Wells, etc): None  Discussed with: Nneka Fernandes APRN, palliative care, and the patient's father Noel  Care Planning  Shared decision making: Nneka Fernandes APRN, palliative care, and the patient's father Noel  code status and discussions: DNR/DNI per documentation with patient upon admission.  Surrogate Decision Maker Father Noel    Disposition  Social Determinants of Health that impact treatment or disposition: Patient is a resident of [facility] family is requesting discharge to new SNF.  I expect the patient to be discharged to SNF in unknown days.     Electronically signed by BERT Asif, 07/18/24, 12:41 CDT.     Electronically signed by Montserrat Feliciano APRN at 07/18/24 1241       Montserrat Feliciano APRN at 07/17/24 1643       Attestation signed by Tae Charles MD at 07/17/24 1723    I performed a substantive part of the MDM during the patient’s E/M visit. I personally made or   approved the documented management plan and acknowledge its risk of complications.     Electronically signed by Tae Charles MD, 7/17/2024, 17:23 CDT.                   Patient Name: Namita Zabala  Date of Admission: 7/14/2024  Today's Date: 07/17/24  Length of Stay: 2  Primary Care Physician: David Lara MD    Subjective   Chief Complaint: Clogged PEG tube and displaced tracheostomy.  HPI   Namita Zabala is a 49-year-old female with a history of anoxic brain damage, tracheostomy, PEG tube, chronic respiratory failure,  pulmonary embolism, recurrent UTI, and staghorn renal calculus.  Patient was transferred per EMS from [facility] with complaints of a clogged PEG tube and displaced tracheostomy.  Upon assessment at [facility] emergency department the patient presented in a septic state with fever of 101.7, tachycardia at 121, WBC of 18.8, with obvious source of infection, as urinalysis revealed turbid urine with hematuria and nitrates, leukocytes, and 4+ bacteria.  Chronic Mojica catheter managed per outpatient urology at [facility], most recent appointment was 6/27/2024 where she was evaluated post nephrostomy tube being removed, where their recommendations were to observe rather than replace tube at that time.  ED workup revealed tracheostomy was not dislodged however it was extremely clogged with debris and dried secretions, when that was cleaned and replaced patient was able to utilize properly.  Additionally PEG tube was not dislodged, after cleaning debris and evaluating PEG tube it was in proper alignment.  CT scan revealed thickening in handedness on the mucosal urothelium of the collecting system bilaterally may represent acute or subacute inflammatory process/pyeloureteritis, no evidence of acute.  Stable nonobstructive bilateral staghorn calculi, and significant large volume stool in the distal colon with moderate gas distention of proximal colon.  Mildly dilated fluid filled small bowel loops may represent ileus or acute nonspecific enteritis.  No evidence of small bowel obstruction.  ED medications 1 L normal saline bolus, 650 mg Tylenol suppository, and 1 g of Rocephin.  Previous urine culture 5/24 was positive for ESBL and Proteus Mirabilis.  Susceptible to ceftriaxone, 2 g continued.    Today: Patient appears to be resting as comfortably as possible, trach collar 9 L.  Oxygen saturation 94%.    Patient continues to grimace in between care, nursing staff states Toradol has improved.  Patient  continues to be in a mostly vegetative state, able to open eyes she was unable to follow me today.  Repeat KUB this morning showed moderate progressive ileus, possibility of small bowel obstruction.  Patient's tube feed and G-tube meds were placed on hold, to reevaluate with family for how to proceed.  Palliative care performed an extensive discussion today with the patient's father discussing the gravity of her cognitive and physical state, and that given her multiple comorbidities, this will continue to be flung cyclical for what life she has left.  It was stated that the patient appears to have a good prognostic awareness of her dire situation.  Like to go home and discuss with his wife and will let us know what their goals of care will be tomorrow.  Repeat KUB in a.m. to evaluate if surgical intervention may be required if patient's family wants to pursue aggressive treatment.      Review of Systems   Constitutional:  Positive for diaphoresis and fever.   HENT:  Positive for congestion, drooling, rhinorrhea and trouble swallowing.    Eyes:  Positive for discharge. Negative for redness.   Respiratory:  Positive for cough and wheezing.    Cardiovascular:         Sinus tachycardia   Gastrointestinal:  Positive for constipation. Negative for abdominal distention, diarrhea and vomiting.   Endocrine: Negative for heat intolerance.   Genitourinary:         Chronic Mojica catheter, recent removal of nephrostomy tube   Musculoskeletal:         Patient is bedbound with chronic contractures of all extremities.   Skin:  Positive for pallor and wound.        Healing coccyx wound   Neurological:  Positive for facial asymmetry, speech difficulty and weakness.   Hematological:  Does not bruise/bleed easily.   Psychiatric/Behavioral:  Positive for confusion.       All pertinent negatives and positives are as above. All other systems have been reviewed and are negative unless otherwise stated.     Objective    Temp:  [98.1 °F  (36.7 °C)-100 °F (37.8 °C)] 100 °F (37.8 °C)  Heart Rate:  [] 80  Resp:  [16-18] 16  BP: (119-148)/(67-86) 147/81  Physical Exam  Constitutional:       Appearance: She is cachectic. She is ill-appearing and diaphoretic.   HENT:      Head: Normocephalic.      Nose: Congestion and rhinorrhea present.      Mouth/Throat:      Mouth: Mucous membranes are moist.      Pharynx: Oropharynx is clear.   Eyes:      Pupils: Pupils are equal, round, and reactive to light.   Neck:      Comments: Chronic contractures of neck  Cardiovascular:      Rate and Rhythm: Regular rhythm. Tachycardia present.      Pulses: Normal pulses.      Comments: S/ST , up to 144  Pulmonary:      Breath sounds: Wheezing and rhonchi present.      Comments: Trach collar at 9 L  Abdominal:      General: Bowel sounds are decreased. There is no distension.      Palpations: Abdomen is soft.      Tenderness: There is no abdominal tenderness.   Genitourinary:     Comments: Voiding per chronic Mojica catheter  Musculoskeletal:      Cervical back: Rigidity present.      Right lower leg: No edema.      Left lower leg: No edema.      Comments: Bedbound with chronic fractures of all extremities   Skin:     General: Skin is warm and moist.      Capillary Refill: Capillary refill takes less than 2 seconds.      Coloration: Skin is pale.   Neurological:      Mental Status: She is alert. She is disoriented.      Motor: Weakness, atrophy and abnormal muscle tone present.   Psychiatric:         Attention and Perception: She is inattentive.         Mood and Affect: Affect is flat.         Behavior: Behavior is slowed and withdrawn.         Cognition and Memory: Cognition is impaired.      Comments: Nonverbal at baseline       Results Review:  I have reviewed the labs, radiology results, and diagnostic studies.    Laboratory Data:   Results from last 7 days   Lab Units 07/17/24  0411 07/16/24  0654 07/15/24  0600   WBC 10*3/mm3 10.74 13.60* 17.84*   HEMOGLOBIN  g/dL 10.7* 10.1* 10.3*   HEMATOCRIT % 34.0 32.4* 32.7*   PLATELETS 10*3/mm3 287 245 207        Results from last 7 days   Lab Units 07/17/24  0411 07/16/24  0654 07/15/24  0600 07/14/24  2231   SODIUM mmol/L 140 141 141 141   POTASSIUM mmol/L 4.1 4.3 3.5 4.5   CHLORIDE mmol/L 107 107 110* 104   CO2 mmol/L 21.0* 22.0 23.0 26.0   BUN mg/dL 13 20 23* 29*   CREATININE mg/dL 0.32* 0.34* 0.40* 0.46*   CALCIUM mg/dL 9.0 9.5 8.8 10.1   BILIRUBIN mg/dL  --   --   --  0.7   ALK PHOS U/L  --   --   --  84   ALT (SGPT) U/L  --   --   --  14   AST (SGOT) U/L  --   --   --  14   GLUCOSE mg/dL 106* 109* 113* 141*     Urine Culture - Urine, Indwelling Urethral Catheter  Order: [id number] - Reflex for Order [id number]  Status: Final result       Visible to patient: No (scheduled for 7/17/2024 12:06 PM)       Next appt: None    Specimen Information: Indwelling Urethral Catheter; Urine   0 Result Notes  Urine Culture >100,000 CFU/mL Proteus mirabilis Abnormal          Colonization of the urinary tract without infection is common. Treatment is discouraged unless the patient is symptomatic, pregnant, or undergoing an invasive urologic procedure.        Resulting Agency: [facility]     Susceptibility     Proteus mirabilis     SANDEEP     Amoxicillin + Clavulanate <=2 ug/ml Susceptible     Ampicillin <=2 ug/ml Susceptible     Ampicillin + Sulbactam <=2 ug/ml Susceptible     Cefazolin <=4 ug/ml Susceptible     Cefepime <=1 ug/ml Susceptible     Ceftazidime <=1 ug/ml Susceptible     Ceftriaxone <=1 ug/ml Susceptible     Gentamicin <=1 ug/ml Susceptible     Levofloxacin 4 ug/ml Resistant     Nitrofurantoin 128 ug/ml Resistant     Piperacillin + Tazobactam <=4 ug/ml Susceptible     Trimethoprim + Sulfamethoxazole <=20 ug/ml Susceptible               Linear View         Specimen Collected: 07/14/24 22:15 CDT Last Resulted: 07/17/24 11:06 CDT               Radiology Data:   Imaging Results (Last 24 Hours)       Procedure Component Value Units  Date/Time    XR Abdomen KUB [[id number]] Collected: 07/17/24 0650     Updated: 07/17/24 0656    Narrative:      EXAMINATION: XR ABDOMEN KUB-     7/17/2024 2:48 AM     HISTORY: reevaluation of ilieus; N39.0-Urinary tract infection, site not  specified; A41.9-Sepsis, unspecified organism     A frontal projection of the abdomen is compared with the previous study  dated 7/15/2024.     The moderately more progressive dilated loops of small and large bowel  throughout the abdomen.     There is moderate amount of stool in the colon more so in the rectum.     Moderately gas distended stomach is seen.     No acute bony abnormality.       Impression:      1. Moderate progressive ileus. The possibility of small bowel  obstruction may not be excluded. Further follow-up may be obtained.  2. Gastrostomy tube in place.        This report was signed and finalized on 7/17/2024 6:53 AM by Dr. Lui Bush MD.               I have reviewed the patient's current medications.     [Held by provider] baclofen, 40 mg, Per G Tube, TID  bisacodyl, 10 mg, Rectal, Daily  cefTRIAXone, 2,000 mg, Intravenous, Q24H  [Held by provider] cetirizine, 10 mg, Per G Tube, Daily  chlorhexidine, 15 mL, Mouth/Throat, BID  [Held by provider] docusate, 150 mg, Per G Tube, BID  enoxaparin, 40 mg, Subcutaneous, Daily  [Held by provider] Ferrous Sulfate, 300 mg, Per G Tube, BID  fluticasone, 2 spray, Nasal, QAM  [Held by provider] guaifenesin, 600 mg, Per G Tube, BID  metoprolol tartrate, 5 mg, Intravenous, Q6H  [Held by provider] metoprolol tartrate, 50 mg, Per G Tube, BID  nystatin, 1 Application, Topical, BID  [Held by provider] pantoprazole, 40 mg, Oral, BID  [Held by provider] polyethylene glycol, 17 g, Per G Tube, Daily  potassium chloride, 20 mEq, Per G Tube, BID  [Held by provider] rosuvastatin, 5 mg, Per G Tube, Nightly  [Held by provider] saccharomyces boulardii, 250 mg, Per G Tube, Daily  Scopolamine, 1 patch, Transdermal, Q72H  [Held by  provider] sennosides-docusate, 2 tablet, Oral, BID  [Held by provider] simethicone, 20 mg, Per G Tube, Q6H  sodium chloride, 10 mL, Intravenous, Q12H  [Held by provider] thiamine, 100 mg, Per G Tube, Daily  [Held by provider] tiZANidine, 4 mg, Per G Tube, Q8H         Assessment/Plan   Assessment  Active Hospital Problems    Diagnosis     **Sepsis secondary to UTI     Presence of externally removable percutaneous endoscopic gastrostomy (PEG) tube     History of anoxic brain injury     Functional quadriplegia     Tracheostomy present     Ileus        Treatment Plan  1.  Sepsis secondary to UTI-7/14/2024 septic state with fever of 101.7, tachycardia at 121, WBC of 18.8, with obvious source of infection, as urinalysis revealed turbid urine with hematuria and nitrates, leukocytes, and 4+ bacteria.  Patient has had multiple UTIs over the last several months last of which was 5/2024 with ESBL and Proteus  Mirabilis, which was susceptible to ceftriaxone.  Urine culture positive today given for Proteus Mirabilis, continue 2 g ceftriaxone do to susceptibility One blood cultures positive for Staphylococcus, contaminant.    2.  History of anoxic brain injury/functional quadriplegia-according to nursing facility patient is at her baseline, they transferred her due to possible trach dislodgment.  Patient appears at her baseline, she continues to be nonverbal, will follow commands with her eyes however is unable to move any extremities.  Palliative care had an extensive discussion with patient's father today goal, going overall goals of care to include palliative care versus hospice care.  He will go home this evening and discussed the treatment with his wife and will let us know in the morning.    3.  Tracheostomy present-patient was brought to the ED due to possible trach dislodgment.  After MD and [facility] ED evaluated, it was caked in debris and old dried accretions.  Once that was cleaned and reassess tracheostomy was in place  and patient resumed home tracheostomy immunity.  Currently patient is stable on 9 L tracheostomy collar, oxygen saturation is 94% at present.  Continue tracheostomy care protocol, ABGs as needed, pulmonary hygiene and suctioning as needed.    4.  Ileus-patient CT scan on admission revealed possible ileus or acute nonspecific enteritis.  Follow-up KUB this morning revealed probable small bowel traction, tube feed and G-tube medications on hold.  Nishant KUB in the a.m. to determine if tube feeds can be restarted or if surgical intervention may be required if family decides to pursue treatment.    5.  Presence of externally removable percutaneous endoscopic gastrostomy tube-tube feeding on hold due to probable small bowel obstruction.  Reevaluate with KUB in AM.      VTE prophylaxis Lovenox 40 mg.  Labs in a.m.    Medical Decision Making  Number and Complexity of problems: 5  Differential Diagnosis: None    Conditions and Status        Condition is unchanged.     [facility] Data  External documents reviewed:   CODE STATUS-DNR/DNI per previous documentation  Cardiac tracing (EKG, telemetry) interpretation: 7/17/2024 EKG interpretation sinus tachycardia, ventricular rate of 111, atrial rate of 111, QTc 435 ms  radiology interpretation:   7/17/2024 KUB per radiology reviewed  Labs reviewed:   1/17/2024 BMP, CBC, blood and urine culture final results, reviewed, repeat in a.m.  Any tests that were considered but not ordered: None     Decision rules/scores evaluated (example [id number], Wells, etc): None  Discussed with: Nneka Sidhu APRN, palliative care and patient     Care Planning  Shared decision making: Nneka Sidhu APRN palliative care and patient   code status and discussions: DNR/DNI per documentation with patient upon admission.  Surrogate Decision Maker her mother Marquita    Disposition  Social Determinants of Health that impact treatment or disposition: Patient is a resident of [facility]  nursing family is requesting discharge to new SNF.  I expect the patient to be discharged to SNF in unknown days.     Electronically signed by BERT Asif, 07/17/24, 16:43 CDT.     Electronically signed by Tae Charles MD at 07/17/24 1723       Nneka Barton APRN at 07/17/24 1032              [facility] Palliative Care Services  Progress Note  Patient Name: Namita Zabala  Date of Admission: 7/14/2024  Today's Date: 07/17/24     Code Status and Medical Interventions:   Ordered at: 07/15/24 0243     Medical Intervention Limits:    No intubation (DNI)    No cardioversion     Code Status (Patient has no pulse and is not breathing):    No CPR (Do Not Attempt to Resuscitate)     Medical Interventions (Patient has pulse or is breathing):    Limited Support     Subjective   Chief complaint/Reason for Referral/Visit: Follow up on Goals of Care/Advance Care Planning.    Medical record reviewed. Events noted.  Labs reviewed.  KUB completed this morning reveals moderate progressive ileus and small bowel obstruction cannot be excluded.  Tube feeds have been stopped.  She is lying in bed with her eyes open and in no apparent distress.  Remains unable to follow commands.  No visitors at bedside.  Discussed with nursing.    Advance Care Planning    Advanced Directives: No advance directive on file.     Advance Care Discussion: Ms. Zabala remains unable to demonstrate ability to make complex medical decisions.  Spoke with her father, Noel, via telephone.  Provided brief update including findings of progressive ileus.  He shared he has been in the process of working on guardianship further this morning.  Reports he and his wife plan to come visit this afternoon and would be open to having further discussion.  Will plan to meet with them once they arrive.  Updated nursing.     The patient receives support from her parents. Patient's parents are her next of kin.  Goals of care:  Ongoing.    Review of Systems   Unable to perform ROS: Patient nonverbal     Pain Assessment  CPOT and PAINAD Scales: PAINAD (Pain Assessment in Advance Dementia Scale)  PAINAD Breathin-->normal  PAINAD Negative Vocalization: 0-->none  PAINAD Facial Expression: 2-->facial grimacing  PAINAD Body Language: 1-->tense, distressed pacing, fidgeting  PAINAD Consolability: 1-->distracted or reassured by voice/touch  PAINAD Score: 4  Objective   Diagnostics: Reviewed      Intake/Output Summary (Last 24 hours) at 2024 1032  Last data filed at 2024 0931  Gross per 24 hour   Intake 5253 ml   Output 2475 ml   Net 2778 ml     Current Facility-Administered Medications   Medication Dose Route Frequency Provider Last Rate Last Admin    acetaminophen (TYLENOL) tablet 650 mg  650 mg Per G Tube Q4H PRN David Hernández MD   650 mg at 24 1602    Or    acetaminophen (TYLENOL) 160 MG/5ML oral solution 650 mg  650 mg Per G Tube Q4H PRN David Hernández MD        Or    acetaminophen (TYLENOL) suppository 650 mg  650 mg Rectal Q4H PRN David Hernández MD   650 mg at 07/15/24 0522    artificial tears ophthalmic ointment   Both Eyes Q1H PRN Montserrat Feliciano APRN        [Held by provider] baclofen (LIORESAL) tablet 40 mg  40 mg Per G Tube TID Montserrat Feliciano APRN   40 mg at 242    bisacodyl (DULCOLAX) suppository 10 mg  10 mg Rectal Q48H PRN David Hernández MD        bisacodyl (DULCOLAX) suppository 10 mg  10 mg Rectal Daily Montserrat Feliciano APRN   10 mg at 07/15/24 1116    cefTRIAXone (ROCEPHIN) 2,000 mg in sodium chloride 0.9 % 100 mL MBP  2,000 mg Intravenous Q24H David Hernández  mL/hr at 24 2,000 mg at 24    [Held by provider] cetirizine (zyrTEC) tablet 10 mg  10 mg Per G Tube Daily David Hernández MD   10 mg at 24 0819    chlorhexidine (PERIDEX) 0.12 % solution 15 mL  15 mL Mouth/Throat BID Montserrat APRN   15 mL  at 07/17/24 0907    [Held by provider] docusate (COLACE) 50 MG/5ML liquid 150 mg  150 mg Per G Tube BID Montserrat Feliciano APRN   150 mg at 07/16/24 2122    Enoxaparin Sodium (LOVENOX) syringe 40 mg  40 mg Subcutaneous Daily David Hernández MD   40 mg at 07/17/24 0856    [Held by provider] Ferrous Sulfate 300 (60 Fe) MG/5ML solution 300 mg  300 mg Per G Tube BID Montserrat Feliciano APRN   300 mg at 07/16/24 2122    fleet enema 1 enema  1 enema Rectal Q48H PRN David Hernández MD        fluticasone (FLONASE) 50 MCG/ACT nasal spray 2 spray  2 spray Nasal QAM Montserrat Feliciano APRN   2 spray at 07/17/24 0505    [Held by provider] guaifenesin (ROBITUSSIN) 100 MG/5ML liquid 600 mg  600 mg Per G Tube BID David Hernández MD   600 mg at 07/16/24 2122    HYDROcodone-acetaminophen (NORCO) 7.5-325 MG per tablet 1 tablet  1 tablet Oral Q4H PRN Tae Charles MD   1 tablet at 07/17/24 0513    ipratropium-albuterol (DUO-NEB) nebulizer solution 3 mL  3 mL Nebulization Q4H PRN David Hernández MD        lactated ringers infusion  75 mL/hr Intravenous Continuous Montserrat Feliciano APRN 75 mL/hr at 07/17/24 0856 75 mL/hr at 07/17/24 0856    metoprolol tartrate (LOPRESSOR) injection 5 mg  5 mg Intravenous Q6H Montserrat Feliciano APRN   5 mg at 07/17/24 0906    [Held by provider] metoprolol tartrate (LOPRESSOR) tablet 50 mg  50 mg Per G Tube BID Montserrat Feliciano APRN   50 mg at 07/16/24 2122    nystatin (MYCOSTATIN) powder 1 Application  1 Application Topical BID Montserrat Feliciano APRN   1 Application at 07/17/24 0907    ondansetron (ZOFRAN) 4 MG/5ML oral solution 4 mg  4 mg Oral Q6H PRN Montserrat Feliciano APRN        ondansetron (ZOFRAN) injection 4 mg  4 mg Intravenous Q6H PRN David Hernández MD        [Held by provider] pantoprazole (PROTONIX) EC tablet 40 mg  40 mg Oral BID Montserrat Feliciano APRN   40 mg at 07/16/24 2122    [Held by provider] polyethylene glycol (MIRALAX) packet 17 g  17 g  Per G Tube Daily David Hernández MD   17 g at 07/16/24 0819    potassium chloride (KAYCIEL) 20 mEq/15 mL solution 20 mEq  20 mEq Per G Tube BID Montserrat Feliciano APRN   20 mEq at 07/16/24 2122    [Held by provider] rosuvastatin (CRESTOR) tablet 5 mg  5 mg Per G Tube Nightly David Hernández MD   5 mg at 07/16/24 2122    [Held by provider] saccharomyces boulardii (FLORASTOR) capsule 250 mg  250 mg Per G Tube Daily Montserrat Feliciano APRN   250 mg at 07/16/24 1602    scopolamine patch 1 mg/72 hr  1 patch Transdermal Q72H Montserrat Feliciano APRN   1 patch at 07/15/24 1459    [Held by provider] sennosides-docusate (PERICOLACE) 8.6-50 MG per tablet 2 tablet  2 tablet Oral BID Montserrat Feliciano APRN   2 tablet at 07/16/24 0819    [Held by provider] simethicone (MYLICON) 40 MG/0.6ML drops 20 mg  20 mg Per G Tube Q6H Montserrat Feliciano APRN   20 mg at 07/17/24 0254    sodium chloride 0.9 % flush 10 mL  10 mL Intravenous Q12H David Hernández MD   10 mL at 07/17/24 0901    sodium chloride 0.9 % flush 10 mL  10 mL Intravenous PRN David Hernández MD        sodium chloride 0.9 % infusion 40 mL  40 mL Intravenous PRN David Hernández MD        sodium chloride 0.9 % infusion  100 mL/hr Intravenous Continuous David Hernández  mL/hr at 07/17/24 0504 100 mL/hr at 07/17/24 0504    sodium chloride nasal spray 2 spray  2 spray Each Nare PRN Montserrat Feliciano APRN        [Held by provider] thiamine (VITAMIN B-1) tablet 100 mg  100 mg Per G Tube Daily Montserrat Feliciano APRN   100 mg at 07/16/24 1602    [Held by provider] tiZANidine (ZANAFLEX) tablet 4 mg  4 mg Per G Tube Q8H Montserrat Feliciano APRN   4 mg at 07/17/24 0505    zinc oxide 20 % ointment   Topical Q4H PRN Montserrat Feliciano APRN         lactated ringers, 75 mL/hr, Last Rate: 75 mL/hr (07/17/24 0856)  sodium chloride, 100 mL/hr, Last Rate: 100 mL/hr (07/17/24 0504)        acetaminophen **OR** acetaminophen **OR**  "acetaminophen    artificial tears    bisacodyl    fleet enema    HYDROcodone-acetaminophen    ipratropium-albuterol    ondansetron    ondansetron    sodium chloride    sodium chloride    sodium chloride    zinc oxide  Current medications patient is presently taking including all prescriptions, over-the-counter, herbals and vitamin/mineral/dietary (nutritional) supplements with reviewed including route, type, dose and frequency and are current per MAR at time of dictation.    Assessment:  Vital Signs: /69 (BP Location: Right leg, Patient Position: Lying)   Pulse 72   Temp 99.2 °F (37.3 °C) (Oral)   Resp 16   Ht 160 cm (63\")   Wt 59.4 kg (131 lb)   LMP  (LMP Unknown)   SpO2 96%   BMI 23.21 kg/m²     Physical Exam  Vitals and nursing note reviewed.   Constitutional:       General: She is awake. She is not in acute distress.     Appearance: She is ill-appearing.   HENT:      Head: Normocephalic.   Eyes:      General: Lids are normal.   Neck:      Trachea: Tracheostomy and abnormal tracheal secretions present.   Cardiovascular:      Rate and Rhythm: Normal rate.   Pulmonary:      Comments: 8 L oxygen via trach collar.   Abdominal:      Palpations: Abdomen is soft.      Comments: PEG tube present.  Tube feeds on hold.    Musculoskeletal:      Comments: Contractures of all extremities.    Skin:     General: Skin is warm and dry.   Psychiatric:         Speech: She is noncommunicative.     Functional status: Palliative Performance Scale Score: Performance 10% based on the following measures: Ambulation: Totally bed bound, Activity and Evidence of Disease: Unable to do any work, extensive evidence of disease, Self-Care: Total care required,  Intake: Mouth care only, LOC: Drowsy or comatose.  Nutritional status: Albumin 3.7. Body mass index is 23.21 kg/m².   Patient status: Disease state: Controlled with current treatments.    Active Hospital Problems    Diagnosis     **Sepsis secondary to UTI     Presence of " externally removable percutaneous endoscopic gastrostomy (PEG) tube     History of anoxic brain injury     Functional quadriplegia     Tracheostomy present     Ileus        Impression/Problem List:  Sepsis secondary to urinary tract infection  History of anoxic brain injury  Ileus  Functional quadriplegia / Contractures / Bedbound / Impaired mobility and ADLs  Tracheostomy present  Kidney stones  Neurogenic bladder with chronic indwelling stratton catheter   Presence of PEG tube    Plan / Recommendations     Palliative Care Encounter   Goals of care include CODE STATUS NO CPR with limited support interventions.    Prognosis is poor long-term secondary to sepsis due to UTI, anoxic brain injury, functional quadriplegia, presence of tracheostomy and PEG tube, ileus and other comorbidities listed above.      Ms. Zabala's parents, Noel and Marquita, are her next of kin as her spouse recently passed away and she has no children.       Ms. Zabala remains unable to demonstrate ability to make complex medical decisions.  Spoke with her father, Noel, via telephone.       Reports he and his wife plan to come visit this afternoon and would be open to having further discussion.  Will plan to meet with them once they arrive.       ADDENDUM:  Met with Ms. Zabala's father, Noel, this afternoon at bedside.  Marquita unfortunately unable to be here today due to her own health issues and resting from recent events.  We discussed treatment options further.  Explained concerns with findings of ileus and potential small bowel obstruction.  We discussed potential risks associated with this.  Noel reflected further on all of the medical issues his daughter has endured over the last 3 years including multiple complex hospitalizations, ED visits and nursing facility placements.  He shared she has progressively declined over the last year.  He shared he is still hopeful for a miracle and prays daily however also appears realistic.  Discussed  "concerns with her overall decline placing her at high risk for continued frequent hospitalization.  He shared he feels that is to continuing cycle.  Explained that this will likely continue for the rest of her life given complex comorbidities.  He demonstrated understanding.  We discussed treatment options further including continuing current measures versus transition to more comfort focused care including hospice services.  Hospice discussed at length including details and expectations.  Explained goal was to focus on symptom management and end-of-life care rather than life-prolonging interventions and ongoing workup.  Discussed cares focused on treating symptoms rather than labs, monitors, etc and he demonstrated understanding.  He shared it is difficult to watch or go through hospitalizations as he stated \"nothing ever changes\" referring to her cognitive/physical state.  He questioned whether she could be placed at another SNF if they wish to proceed with hospice.  Have discussed with SW and looking into alternative options.  We also discussed option of placing hospice consult and discussing further with hospice liaison.  He requested to speak with his wife further tonight and tomorrow to determine her thoughts before proceeding with this.  He was appreciative of discussion.  Appears to have good prognostic awareness while being hopeful.  Denied any questions at this time.  Encouraged if arise.  Support provided.  Updated nursing and hospitalist APRN of discussion.     Thank you for allowing us to participate in patient's plan of care. Palliative Care Team will continue to follow patient.    Time spent:65 minutes spent reviewing medical and medication records, assessing and examining patient, discussing with family, answering questions, providing some guidance about a plan and documentation of care, and coordinating care with other healthcare members, with > 50% time spent face to face.   30 minutes spent on " advance care planning.     Electronically signed by, BERT White, 07/17/24.     Electronically signed by Nneka Barton, BERT at 07/17/24 5286

## 2024-07-18 NOTE — CONSULTS
[facility]   Consult Note    Patient Name: Namita Zabala  : 1975  MRN: [id number]  Primary Care Physician:  David Lara MD  Referring Physician: David Hernández,*  Date of admission: 2024    Inpatient ENT Consult  Consult performed by: Breanna Hill APRN  Consult ordered by: Montserrat Feliciano APRN        Subjective   Subjective     Reason for Consult/ Chief Complaint: trachdeostomy management    History of Present Illness  Namita Zabala is a 49 y.o. female with a history of anoxic brain injury with tracheostomy placement.  Patient was transferred from [facility] to [facility] emergency department with complaints of decreased mental status and possible displacement of the tracheostomy  Patient is currently admitted for sepsis    Review of Systems   Reason unable to perform ROS: altered mental status.        Personal History     Past Medical History:   Diagnosis Date    Acute kidney failure, unspecified     Angina at rest     Anoxic brain damage, not elsewhere classified     Chronic pulmonary embolism     Chronic respiratory failure, unspecified whether with hypoxia or hypercapnia     Dependence on respirator (ventilator) status     Gastrointestinal hemorrhage, unspecified     Hypercalcemia     Iron deficiency anemia     Metabolic encephalopathy     Migraine     Sees Pain Management for injections.     MVP (mitral valve prolapse)     Other dysphagia     Other pulmonary embolism with acute cor pulmonale     Renal disorder     Ruptured bladder     Syncope     Urinary tract infection, site not specified        Past Surgical History:   Procedure Laterality Date    ABDOMINAL SURGERY      BREAST BIOPSY Right     benign    GTUBE INSERTION      INSERTION HEMODIALYSIS CATHETER Left 2021    Procedure: HEMODIALYSIS CATHETER INSERTION;  Surgeon: Anders Kaur MD;  Location: [place] [address];  Service: Vascular;  Laterality: Left;     TONSILLECTOMY      TRACHEOSTOMY         Family History: Her family history includes Colon cancer (age of onset: 52) in her maternal aunt; Fibromyalgia in her mother; Heart disease in her mother; Hodgkin's lymphoma in her paternal grandmother; Hypertension in her mother.     Social History: She  reports that she has never smoked. She has never used smokeless tobacco. She reports that she does not drink alcohol and does not use drugs.    Home Medications:  Docusate Sodium, Eyelid Cleansers, Ferrous Sulfate, HYDROcodone-acetaminophen, Renacidin, acetaminophen, albuterol, baclofen, bisacodyl, carboxymethylcellulose, chlorhexidine, fleet enema, hydrOXYzine pamoate, metoprolol tartrate, ondansetron, pantoprazole, polyethylene glycol, potassium chloride, saccharomyces boulardii, simethicone, sodium chloride, thiamine, and tiZANidine    Allergies:  She is allergic to contrast dye (echo or unknown ct/mr), ct contrast, coconut, levaquin [levofloxacin], nuts, penicillins, and turkey.    Objective    Objective     Vitals:    Temp:  [97.5 °F (36.4 °C)-100 °F (37.8 °C)] 98.8 °F (37.1 °C)  Heart Rate:  [] 75  Resp:  [16-28] 16  BP: (112-149)/(63-93) 136/72  Flow (L/min):  [6-10] 10  Output by Drain (mL) 07/17/24 0701 - 07/17/24 1900 07/17/24 1901 - 07/18/24 0700 07/18/24 0701 - 07/18/24 1252 Range Total   Urethral Catheter Non-latex 18 Fr. 400   400       Physical Exam  Constitutional:       Appearance: She is ill-appearing.      Comments: Eyes open patient non responsive to verbal stimuli     HENT:      Head:      Comments: Patient with significant contracture and torticollis of neck   Placement of head/neck has tracheostomy sitting at angled position        Nose: Nose normal.      Mouth/Throat:      Comments: Drooling noted from mouth     Neck:      Comments: Tracheostomy at angled position   No blood noted surrounding tracheostomy   Clear thin secretions noted trach collar in place                        [facility] Otolaryngology Head and Neck Surgery  PROCEDURE NOTE    Anesthesia:   none    Endoscopy Type:   Flexible Laryngoscopy    Indications for Procedure:   1. Acute UTI (urinary tract infection)    2. Sepsis, due to unspecified organism, unspecified whether acute organ dysfunction present         Procedure Details:    The patient was placed in an upright position.  The laryngoscope was passed through tracheostomy    Possible granulation tissue noted during laryngoscopy unable to pass scope past tissue   Patient tolerated well         Condition:  Stable.  Patient tolerated procedure well.    Complications:  None        Result Review    Result Review:  I have personally reviewed the results from the time of this admission to 7/18/2024 12:52 CDT and agree with these findings:  []  Laboratory list / accordion  []  Microbiology  []  Radiology  []  EKG/Telemetry   []  Cardiology/Vascular   []  Pathology  []  Old records  []  Other:  Most notable findings include: chest x ray revealed tracheostomy tube retracted         Assessment & Plan   Assessment / Plan     Brief Patient Summary:  Namita Zabala is a 49 y.o. female currently admitted for treatment of sepsis.  Patient has a tracheostomy and is currently inpatient at [facility].  ENT consultation was made for evaluation of possible displaced tracheostomy tube.  Patient has had normal oxygen saturations since arrival at [facility]    Active Hospital Problems:  Active Hospital Problems    Diagnosis     **Sepsis secondary to UTI     High anion gap metabolic acidosis     Constipation     Chronic indwelling [place] catheter     Presence of externally removable percutaneous endoscopic gastrostomy (PEG) tube     History of anoxic brain injury     Functional quadriplegia     Tracheostomy present        Plan:   Will continue to monitor   Discussed patient exam with Dr. Penny Hill, APRN

## 2024-07-18 NOTE — PROGRESS NOTES
Patient Name: Namita Zabala  Date of Admission: 7/14/2024  Today's Date: 07/18/24  Length of Stay: 3  Primary Care Physician: David Lara MD    Subjective   Chief Complaint: Clogged PEG tube and displaced tracheostomy.  HPI   Namita Zabala is a 49-year-old female with a history of anoxic brain damage, tracheostomy, PEG tube, chronic respiratory failure, pulmonary embolism, recurrent UTI, and staghorn renal calculus.  Patient was transferred per EMS from [facility] with complaints of a clogged PEG tube and displaced tracheostomy.  Upon assessment at [facility] emergency department the patient presented in a septic state with fever of 101.7, tachycardia at 121, WBC of 18.8, with obvious source of infection, as urinalysis revealed turbid urine with hematuria and nitrates, leukocytes, and 4+ bacteria.  Chronic Mojica catheter managed per outpatient urology at [facility], most recent appointment was 6/27/2024 where she was evaluated post nephrostomy tube being removed, where their recommendations were to observe rather than replace tube at that time.  ED workup revealed tracheostomy was not dislodged however it was extremely clogged with debris and dried secretions, when that was cleaned and replaced patient was able to utilize properly.  Additionally PEG tube was not dislodged, after cleaning debris and evaluating PEG tube it was in proper alignment.  CT scan revealed thickening in handedness on the mucosal urothelium of the collecting system bilaterally may represent acute or subacute inflammatory process/pyeloureteritis, no evidence of acute.  Stable nonobstructive bilateral staghorn calculi, and significant large volume stool in the distal colon with moderate gas distention of proximal colon.  Mildly dilated fluid filled small bowel loops may represent ileus or acute nonspecific enteritis.  No evidence of small bowel obstruction.  ED medications 1 L normal saline bolus,  650 mg Tylenol suppository, and 1 g of Rocephin.  Previous urine culture 5/24 was positive for ESBL and Proteus Mirabilis.  Susceptible to ceftriaxone, 2 g continued.    Today: Rapid response overnight patient developed respiratory distress after given approximately 10 cc of oral contrast through her PEG tube.  Dr. Dan who responded to the rapid response, she was not sure if it was just a fluid overload issue versus a true contrast allergy as patient has tolerated contrast in the past.  Per Dr. Dan patient appeared to be struggling to catch her breath with current oxygenation at 9 L on trach collar and oxygen saturation was approximately 79 to 80%.  Patient was immediately given patient was given 25 mg IV Benadryl, 20 mg of IV Pepcid, 125 mg of Solu-Medrol and 0.5 mL of racemic epinephrine per trach.  She stated that the patient's oxygen saturation immediately responded with relaxation of her respiratory distress, back to baseline and oxygen saturation was 94%.  Initial ABGs during rapid response pH 7.2, pCO2 41.4, pO2 52.5, HCO3 of 19.6.  Patient responded well and was back to her baseline 9 L on her trach collar.  ABGs were repeated at 1050 this a.m. and patient again is compensated.  pH 7.4, pCO2 30.2, pO2 88.9, HCO3 of 19.1.    I spoke with CT technician who was able to perform her CT without IV contrast which revealed no acute intra-abdominal finding, including small bowel obstruction, and is retaining a large ball of stool at the rectum now measuring send 6 cm, previously 8.5 cm on 7/15/2024.  Orders placed for immediate milk molasses enema after suppository, will continue to follow.  Due to the positive curtaining CT results, patient's tube feeding was restarted per dietitian's recommendations and all p.o. meds were resumed.    Per chest x-ray this morning performed during rapid response, tracheostomy tube appears retracted compared to prior study, tip no longer projecting over the trachea.  Respiratory  therapy unsuccessfully tried to realign, ENT consult placed, awaiting recommendations     After the episode this morning I had a phone conversation with the patient's father Noel Johnson.  Again discussed the gravity of the patient's medical situation.  At that time I did not have her CT results but we discussed again the extensive problems, the continued infection, and the need to have serious discussions regarding palliative versus hospice care going forward.  All questions were answered to the best of my ability and he confirmed understanding, and he and his wife will be here this afternoon to discuss further with palliative care      Review of Systems   Constitutional:  Positive for diaphoresis and fever.   HENT:  Positive for congestion, drooling, rhinorrhea and trouble swallowing.    Eyes:  Positive for discharge. Negative for redness.   Respiratory:  Positive for cough and wheezing.    Cardiovascular:         Sinus tachycardia   Gastrointestinal:  Positive for constipation. Negative for abdominal distention, diarrhea and vomiting.   Endocrine: Negative for heat intolerance.   Genitourinary:         Chronic Mojica catheter, recent removal of nephrostomy tube   Musculoskeletal:         Patient is bedbound with chronic contractures of all extremities.   Skin:  Positive for pallor and wound.        Healing coccyx wound   Neurological:  Positive for facial asymmetry, speech difficulty and weakness.   Hematological:  Does not bruise/bleed easily.   Psychiatric/Behavioral:  Positive for confusion.       All pertinent negatives and positives are as above. All other systems have been reviewed and are negative unless otherwise stated.     Objective    Temp:  [97.5 °F (36.4 °C)-100 °F (37.8 °C)] 98.8 °F (37.1 °C)  Heart Rate:  [] 75  Resp:  [16-28] 16  BP: (112-149)/(63-93) 136/72  Physical Exam  Constitutional:       Appearance: She is cachectic. She is ill-appearing and diaphoretic.   HENT:      Head:  Normocephalic.      Nose: Congestion and rhinorrhea present.      Mouth/Throat:      Mouth: Mucous membranes are moist.      Pharynx: Oropharynx is clear.   Eyes:      Pupils: Pupils are equal, round, and reactive to light.   Neck:      Comments: Chronic contractures of neck  Cardiovascular:      Rate and Rhythm: Regular rhythm. Tachycardia present.      Pulses: Normal pulses.      Comments: S/ST , up to 144  Pulmonary:      Breath sounds: Wheezing and rhonchi present.      Comments: Trach collar at 9 L  Abdominal:      General: Bowel sounds are decreased. There is no distension.      Palpations: Abdomen is soft.      Tenderness: There is no abdominal tenderness.   Genitourinary:     Comments: Voiding per chronic Mojica catheter  Musculoskeletal:      Cervical back: Rigidity present.      Right lower leg: No edema.      Left lower leg: No edema.      Comments: Bedbound with chronic fractures of all extremities   Skin:     General: Skin is warm and moist.      Capillary Refill: Capillary refill takes less than 2 seconds.      Coloration: Skin is pale.   Neurological:      Mental Status: She is alert. She is disoriented.      Motor: Weakness, atrophy and abnormal muscle tone present.   Psychiatric:         Attention and Perception: She is inattentive.         Mood and Affect: Affect is flat.         Behavior: Behavior is slowed and withdrawn.         Cognition and Memory: Cognition is impaired.      Comments: Nonverbal at baseline       Results Review:  I have reviewed the labs, radiology results, and diagnostic studies.    Laboratory Data:   Results from last 7 days   Lab Units 07/18/24  0817 07/17/24  0411 07/16/24  0654   WBC 10*3/mm3 13.06* 10.74 13.60*   HEMOGLOBIN g/dL 12.5 10.7* 10.1*   HEMATOCRIT % 38.5 34.0 32.4*   PLATELETS 10*3/mm3 255 287 245        Results from last 7 days   Lab Units 07/18/24  0817 07/17/24  0411 07/16/24  0654 07/15/24  0600 07/14/24  2231   SODIUM mmol/L 137 140 141   < > 141    POTASSIUM mmol/L 4.1 4.1 4.3   < > 4.5   CHLORIDE mmol/L 102 107 107   < > 104   CO2 mmol/L 17.0* 21.0* 22.0   < > 26.0   BUN mg/dL 18 13 20   < > 29*   CREATININE mg/dL 0.40* 0.32* 0.34*   < > 0.46*   CALCIUM mg/dL 9.8 9.0 9.5   < > 10.1   BILIRUBIN mg/dL  --   --   --   --  0.7   ALK PHOS U/L  --   --   --   --  84   ALT (SGPT) U/L  --   --   --   --  14   AST (SGOT) U/L  --   --   --   --  14   GLUCOSE mg/dL 86 106* 109*   < > 141*    < > = values in this interval not displayed.     Urine Culture - Urine, Indwelling Urethral Catheter  Order: [id number] - Reflex for Order [id number]  Status: Final result       Visible to patient: No (scheduled for 7/17/2024 12:06 PM)       Next appt: None    Specimen Information: Indwelling Urethral Catheter; Urine   0 Result Notes  Urine Culture >100,000 CFU/mL Proteus mirabilis Abnormal          Colonization of the urinary tract without infection is common. Treatment is discouraged unless the patient is symptomatic, pregnant, or undergoing an invasive urologic procedure.        Resulting Agency: [facility]     Susceptibility     Proteus mirabilis     SANDEEP     Amoxicillin + Clavulanate <=2 ug/ml Susceptible     Ampicillin <=2 ug/ml Susceptible     Ampicillin + Sulbactam <=2 ug/ml Susceptible     Cefazolin <=4 ug/ml Susceptible     Cefepime <=1 ug/ml Susceptible     Ceftazidime <=1 ug/ml Susceptible     Ceftriaxone <=1 ug/ml Susceptible     Gentamicin <=1 ug/ml Susceptible     Levofloxacin 4 ug/ml Resistant     Nitrofurantoin 128 ug/ml Resistant     Piperacillin + Tazobactam <=4 ug/ml Susceptible     Trimethoprim + Sulfamethoxazole <=20 ug/ml Susceptible               Linear View         Specimen Collected: 07/14/24 22:15 CDT Last Resulted: 07/17/24 11:06 CDT               Radiology Data:   Imaging Results (Last 24 Hours)       Procedure Component Value Units Date/Time    CT Abdomen Pelvis Without Contrast [[id number]] Collected: 07/18/24 1119     Updated: 07/18/24 1140     Narrative:      EXAM: CT ABDOMEN PELVIS WO CONTRAST-      DATE: 7/18/2024 8:59 AM     HISTORY: SBO; N39.0-Urinary tract infection, site not specified;  A41.9-Sepsis, unspecified organism       COMPARISON: 7/15/2024.     DOSE LENGTH PRODUCT: 712.25 mGy.cm Automatic exposure control was  utilized to make radiation dose as low as reasonably achievable.     TECHNIQUE: Unenhanced axial images of the abdomen and pelvis obtained  with coronal and sagittal reformats.     FINDINGS: Evaluation limited secondary to lack of intravenous contrast  agent.     VISUALIZED CHEST: Similar patchy groundglass opacities at the lower  lungs, not optimally evaluated on this motion limited exam compared to  7/15/2024. No pleural or pericardial effusion.     LIVER: Normal hepatic contour.     BILIARY: No calcified gallstone. No intrahepatic or extrahepatic bile  duct dilation.      PANCREAS: Atrophic, otherwise within normal limits.     SPLEEN: Normal size and contour.      ADRENAL: Normal appearance of the bilateral adrenal glands.     GENITOURINARY:  There appear to be large bilateral renal calculi with a staghorn  configuration on the RIGHT. Motion partially limits evaluation, but  there appears to be thickening and prominent caliber of the RIGHT  greater than LEFT upper renal collecting systems.     Mojica catheter in a mildly thickened urinary bladder. Nondependent gas  in the urinary bladder, may be due to catheterization. Urinary bladder  wall appears mildly thickened, which may be due to under distention or  cystitis.     Probable atrophic uterus. Adnexa not discretely identified..     PERITONEUM: No free air or ascites.     GI TRACT: PEG tube with balloon appropriately positioned in the distal  stomach. Normal appearance of the stomach and duodenum. No abnormally  dilated loops of bowel or bowel wall thickening. Large amount of colonic  stool. There is contrast throughout the colon. Normal appendix on axial  series 3, images 61-55.      VESSELS: Aorta is normal in course and caliber.     RETROPERITONEUM: No mass, lymphadenopathy or hemorrhage.     SOFT TISSUES: Broad-based LEFT groin hernia containing nondilated loops  of colon.     BONES: Sclerotic focus in the LEFT proximal femur, favored to represent  a bone island. Similar dysmorphic configuration of the pelvis and  thighs.          Impression:      1. No acute intra-abdominal finding. Broad-based LEFT groin hernia  containing nondilated loops of colon. There is enteric contrast in the  colon nearly to the rectum. Ball of stool at the rectum now measures 6  cm, previously 8.5 cm on 7/15/2024.     2. Thickened and prominent caliber of the RIGHT greater than LEFT upper  renal collecting system with a staghorn type calculi. Thickening could  be due to chronic inflammation, infection, or malignancy. Consider  direct visualization when clinically appropriate.     3. Urinary bladder appears mildly thickened, which could be due to under  distention or cystitis. Consider correlation with urinalysis.     4. Similar patchy groundglass opacities lower lungs, not optimally  evaluated on this motion limited exam.     5. Appropriate position of PEG tube.     This report was signed and finalized on 7/18/2024 11:37 AM by Dr Dea Barksdale MD.       XR Chest 1 View [[id number]] Collected: 07/18/24 0657     Updated: 07/18/24 0707    Narrative:      EXAM: XR CHEST 1 VW-      DATE: 7/18/2024 5:30 AM     HISTORY: trach; N39.0-Urinary tract infection, site not specified;  A41.9-Sepsis, unspecified organism       COMPARISON: 7/14/2024.     TECHNIQUE:  Single view. Frontal view of the chest. 1 images.       FINDINGS:    Limited evaluation due to patient positioning. Tracheostomy tube appears  retracted compared to prior.      No pneumothorax or pleural effusion. Patchy bibasilar opacities. Limited  evaluation of the RIGHT lung apex due to overlying structures.     Cardiac and mediastinal silhouette appear within  normal limits. No acute  bony finding.     PEG tube at the partially visualized upper abdomen.          Impression:      1. Tracheostomy tube appears retracted compared to prior. Tip no longer  projecting over the trachea.  2. Streaky bibasilar opacities.     Result communicated by telephone to the patient's nurse Namiat Velez at 7:03 AM on 7/19/2024.     This report was signed and finalized on 7/18/2024 7:04 AM by Dr Dea Barksdale MD.               I have reviewed the patient's current medications.     baclofen, 40 mg, Per G Tube, TID  bisacodyl, 10 mg, Rectal, Daily  cefTRIAXone, 2,000 mg, Intravenous, Q24H  cetirizine, 10 mg, Per G Tube, Daily  chlorhexidine, 15 mL, Mouth/Throat, BID  docusate, 150 mg, Per G Tube, BID  enoxaparin, 40 mg, Subcutaneous, Daily  Ferrous Sulfate, 300 mg, Per G Tube, BID  fluticasone, 2 spray, Nasal, QAM  guaifenesin, 600 mg, Per G Tube, BID  insulin regular, 2-7 Units, Subcutaneous, Q6H  metoprolol tartrate, 50 mg, Per G Tube, BID  metroNIDAZOLE, 500 mg, Intravenous, Q8H  nystatin, 1 Application, Topical, BID  pantoprazole, 40 mg, Oral, BID  polyethylene glycol, 17 g, Per G Tube, Daily  potassium chloride, 20 mEq, Per G Tube, BID  rosuvastatin, 5 mg, Per G Tube, Nightly  saccharomyces boulardii, 250 mg, Per G Tube, Daily  Scopolamine, 1 patch, Transdermal, Q72H  sennosides-docusate, 2 tablet, Oral, BID  simethicone, 20 mg, Per G Tube, Q6H  sodium chloride, 10 mL, Intravenous, Q12H  thiamine, 100 mg, Per G Tube, Daily  tiZANidine, 4 mg, Per G Tube, Q8H         Assessment/Plan   Assessment  Active Hospital Problems    Diagnosis     **Sepsis secondary to UTI     High anion gap metabolic acidosis     Constipation     Chronic indwelling Mojica catheter     Presence of externally removable percutaneous endoscopic gastrostomy (PEG) tube     History of anoxic brain injury     Functional quadriplegia     Tracheostomy present        Treatment Plan  1.  Sepsis secondary to UTI, with  chronic Mojica catheter-7/14/2024 septic state with fever of 101.7, tachycardia at 121, WBC of 18.8, with obvious source of infection, as urinalysis revealed turbid urine with hematuria and nitrates, leukocytes, and 4+ bacteria. Urine culture positive  for Proteus Mirabilis, continue 2 g ceftriaxone, therapy day 5.      2.  History of anoxic brain injury/functional quadriplegia-after overnight episode of respiratory distress, this a.m. patient is back to baseline.  Patient remains in primarily vegetative state.  Extensive discussion with patient's father this a.m. regarding palliative versus hospice care.  He and his wife will be this afternoon to discuss with palliative care.    3.  Tracheostomy present-currently patient's oxygen saturation is back at baseline on 9 L trach collar.  Per chest x-ray performed this morning during rapid response.  Tracheostomy tube appears retracted compared to prior study, tip no longer projecting over the trachea.  Respiratory therapy attempted to realign, they were unable to do so.  ENT consult placed.  Will continue to follow     4.  Obstipation-patient CT scan on admission revealed possible ileus or acute nonspecific enteritis.  Follow-up KUBs on 7/16/2024 and 7/17/2024 revealed continued ileus and progressing small bowel obstruction.  CT scan this morning was negative for acute abdominal findings.  Since p.o. meds and tube feeding resumed.  Milk and molasses enema performed this morning.  Continue to follow     5.  Presence of externally removable percutaneous endoscopic gastrostomy tube-after CT scan revealed no acute abdominal findings, tube feeding resumed per dietitian's recommendations.    VTE prophylaxis Lovenox 40 mg.  Labs in a.m.    Medical Decision Making  Number and Complexity of problems: 5  Differential Diagnosis: None    Conditions and Status        Condition is unchanged.     [facility] Data  External documents reviewed:   CODE STATUS-DNR/DNI per previous  documentation  Cardiac tracing (EKG, telemetry) interpretation: 7/18/2024 EKG interpretation is tachycardia, ventricular rate 111 atrial rate 111 QTc 435 MS.  Overnight telemetry S/ST 99-1 26.    Radiology interpretation:   7/18/2024 chest x-ray and CT of abdomen pelvis per radiology, reviewed   Labs reviewed:   7/18/2024 ABGs x 2, CBC, BMP, lactic acid reviewed, repeat in a.m.  Any tests that were considered but not ordered: None     Decision rules/scores evaluated (example [id number], [place], etc): None  Discussed with: Nneka Fernandes APRN, palliative care, and the patient's father Noel  Care Planning  Shared decision making: Nneka Fernandes APRN, palliative care, and the patient's father Noel  code status and discussions: DNR/DNI per documentation with patient upon admission.  Surrogate Decision Maker Father Noel    Disposition  Social Determinants of Health that impact treatment or disposition: Patient is a resident of [facility] family is requesting discharge to new SNF.  I expect the patient to be discharged to SNF in unknown days.     Electronically signed by BERT Asif, 07/18/24, 12:41 CDT.

## 2024-07-18 NOTE — PLAN OF CARE
Goal Outcome Evaluation:  Plan of Care Reviewed With: patient        Progress: declining  Outcome Evaluation: Pt feeding stopped on dayshift 7/17, BG 68 at 0200, D50 administered, BG elysia. This AM pt recieved contrast via G-tube for abd/pelvis CT scan, after 2nd dose, pt went into respiratory distress before CT could be completed. Attempted suctioning without success. RT and medical provider at the bedside. Neck Xray completed, benadryl, pepcid, breathing treatment also provided. Pt stabilized and radiology MD called to inform trach is retracted. [place] notified, RT came to bedside and unsuccessful in replacement of trach. ENT to be consulted. Dayshift nurse updated. Pt is relaxed and sleeping at this time.

## 2024-07-18 NOTE — CASE MANAGEMENT/SOCIAL WORK
Continued Stay Note   [place]     Patient Name: Namita Zabala  MRN: [id number]  Today's Date: 7/18/2024    Admit Date: 7/14/2024    Plan: SNF   Discharge Plan       Row Name 07/18/24 1523       Plan    Plan SNF    Plan Comments [facility] currently does not have a bed for pt. Spoke with pt's father. They have been working with palliative care and considering hospice at a snf. Explained to him what that would look like. They are still not wanting pt to return to [facility] so more referrals sent to [facility], [facility], and [facility].                   Discharge Codes    No documentation.                       ORAL Mcintyre

## 2024-07-18 NOTE — PROCEDURES
Tracheostomy Tube evaluation    Date/Time: 7/18/2024 3:05 PM    Performed by: Fermin Francis MD  Authorized by: Fermin Francis MD  Tube type: non-fenestrated  Tube cuff: double cuff  Tube size: 6.0 mm  Comments: The tracheostomy tube was evaluated.  It was well off to the left of center and did not appear to have airflow through it.  I performed flexible endoscopy through the tracheostomy tube and there was no lumen connection to the airway.  I then removed the tracheostomy tube and evaluated the stomal site.  There was no evidence of connection to the airway and it appeared to have had a blind pouch anterior to the trachea down towards the soft tissue of the mediastinum.  I performed flexible endoscopy of this area and again did not see any entry into the airway.  There was some granulation tissue and serosanguineous secretions at the depths of the pouch.  Palpation of this area suggested that the tracheostomy itself was deep to where the pouch was.  A tracheostomy tube was not replaced per the request of the family.

## 2024-07-19 LAB
ANION GAP SERPL CALCULATED.3IONS-SCNC: 13 MMOL/L (ref 5–15)
BACTERIA SPEC AEROBE CULT: NORMAL
BASOPHILS # BLD AUTO: 0.04 10*3/MM3 (ref 0–0.2)
BASOPHILS NFR BLD AUTO: 0.3 % (ref 0–1.5)
BUN SERPL-MCNC: 18 MG/DL (ref 6–20)
BUN/CREAT SERPL: 36.7 (ref 7–25)
CALCIUM SPEC-SCNC: 9.8 MG/DL (ref 8.6–10.5)
CHLORIDE SERPL-SCNC: 104 MMOL/L (ref 98–107)
CO2 SERPL-SCNC: 22 MMOL/L (ref 22–29)
CREAT SERPL-MCNC: 0.49 MG/DL (ref 0.57–1)
DEPRECATED RDW RBC AUTO: 40.4 FL (ref 37–54)
EGFRCR SERPLBLD CKD-EPI 2021: 117.2 ML/MIN/1.73
EOSINOPHIL # BLD AUTO: 0.05 10*3/MM3 (ref 0–0.4)
EOSINOPHIL NFR BLD AUTO: 0.4 % (ref 0.3–6.2)
ERYTHROCYTE [DISTWIDTH] IN BLOOD BY AUTOMATED COUNT: 12.8 % (ref 12.3–15.4)
GLUCOSE BLDC GLUCOMTR-MCNC: 106 MG/DL (ref 70–130)
GLUCOSE BLDC GLUCOMTR-MCNC: 108 MG/DL (ref 70–130)
GLUCOSE BLDC GLUCOMTR-MCNC: 119 MG/DL (ref 70–130)
GLUCOSE BLDC GLUCOMTR-MCNC: 123 MG/DL (ref 70–130)
GLUCOSE SERPL-MCNC: 130 MG/DL (ref 65–99)
HCT VFR BLD AUTO: 33.7 % (ref 34–46.6)
HGB BLD-MCNC: 11.1 G/DL (ref 12–15.9)
IMM GRANULOCYTES # BLD AUTO: 0.08 10*3/MM3 (ref 0–0.05)
IMM GRANULOCYTES NFR BLD AUTO: 0.6 % (ref 0–0.5)
LYMPHOCYTES # BLD AUTO: 1.94 10*3/MM3 (ref 0.7–3.1)
LYMPHOCYTES NFR BLD AUTO: 13.6 % (ref 19.6–45.3)
MCH RBC QN AUTO: 28.9 PG (ref 26.6–33)
MCHC RBC AUTO-ENTMCNC: 32.9 G/DL (ref 31.5–35.7)
MCV RBC AUTO: 87.8 FL (ref 79–97)
MONOCYTES # BLD AUTO: 0.73 10*3/MM3 (ref 0.1–0.9)
MONOCYTES NFR BLD AUTO: 5.1 % (ref 5–12)
NEUTROPHILS NFR BLD AUTO: 11.39 10*3/MM3 (ref 1.7–7)
NEUTROPHILS NFR BLD AUTO: 80 % (ref 42.7–76)
NRBC BLD AUTO-RTO: 0 /100 WBC (ref 0–0.2)
PLATELET # BLD AUTO: 360 10*3/MM3 (ref 140–450)
PMV BLD AUTO: 10.8 FL (ref 6–12)
POTASSIUM SERPL-SCNC: 3.9 MMOL/L (ref 3.5–5.2)
RBC # BLD AUTO: 3.84 10*6/MM3 (ref 3.77–5.28)
SODIUM SERPL-SCNC: 139 MMOL/L (ref 136–145)
WBC NRBC COR # BLD AUTO: 14.23 10*3/MM3 (ref 3.4–10.8)

## 2024-07-19 PROCEDURE — 25010000002 ENOXAPARIN PER 10 MG: Performed by: FAMILY MEDICINE

## 2024-07-19 PROCEDURE — 25010000002 CEFTRIAXONE PER 250 MG

## 2024-07-19 PROCEDURE — 25010000002 METRONIDAZOLE 500 MG/100ML SOLUTION: Performed by: INTERNAL MEDICINE

## 2024-07-19 PROCEDURE — 85025 COMPLETE CBC W/AUTO DIFF WBC: CPT

## 2024-07-19 PROCEDURE — 80048 BASIC METABOLIC PNL TOTAL CA: CPT

## 2024-07-19 PROCEDURE — 25810000003 LACTATED RINGERS PER 1000 ML: Performed by: INTERNAL MEDICINE

## 2024-07-19 PROCEDURE — 82948 REAGENT STRIP/BLOOD GLUCOSE: CPT

## 2024-07-19 RX ADMIN — TIZANIDINE 4 MG: 4 TABLET ORAL at 15:32

## 2024-07-19 RX ADMIN — MINERAL SUPPLEMENT IRON 300 MG / 5 ML STRENGTH LIQUID 100 PER BOX UNFLAVORED 300 MG: at 22:54

## 2024-07-19 RX ADMIN — DOCUSATE SODIUM 150 MG: 50 LIQUID ORAL at 15:46

## 2024-07-19 RX ADMIN — METRONIDAZOLE 500 MG: 500 INJECTION, SOLUTION INTRAVENOUS at 16:36

## 2024-07-19 RX ADMIN — ENOXAPARIN SODIUM 40 MG: 100 INJECTION SUBCUTANEOUS at 15:19

## 2024-07-19 RX ADMIN — DOCUSATE SODIUM 50 MG AND SENNOSIDES 8.6 MG 2 TABLET: 8.6; 5 TABLET, FILM COATED ORAL at 23:11

## 2024-07-19 RX ADMIN — METRONIDAZOLE 500 MG: 500 INJECTION, SOLUTION INTRAVENOUS at 06:03

## 2024-07-19 RX ADMIN — MINERAL SUPPLEMENT IRON 300 MG / 5 ML STRENGTH LIQUID 100 PER BOX UNFLAVORED 300 MG: at 15:25

## 2024-07-19 RX ADMIN — NYSTATIN 1 APPLICATION: 100000 POWDER TOPICAL at 22:58

## 2024-07-19 RX ADMIN — TIZANIDINE 4 MG: 4 TABLET ORAL at 06:03

## 2024-07-19 RX ADMIN — SODIUM CHLORIDE, POTASSIUM CHLORIDE, SODIUM LACTATE AND CALCIUM CHLORIDE 75 ML/HR: 600; 310; 30; 20 INJECTION, SOLUTION INTRAVENOUS at 03:44

## 2024-07-19 RX ADMIN — CEFTRIAXONE SODIUM 2000 MG: 2 INJECTION, POWDER, FOR SOLUTION INTRAMUSCULAR; INTRAVENOUS at 22:55

## 2024-07-19 RX ADMIN — Medication 20 MG: at 22:58

## 2024-07-19 RX ADMIN — NYSTATIN 1 APPLICATION: 100000 POWDER TOPICAL at 15:28

## 2024-07-19 RX ADMIN — HYDROCODONE BITARTRATE AND ACETAMINOPHEN 1 TABLET: 7.5; 325 TABLET ORAL at 03:34

## 2024-07-19 RX ADMIN — POLYETHYLENE GLYCOL 3350 17 G: 17 POWDER, FOR SOLUTION ORAL at 15:25

## 2024-07-19 RX ADMIN — GUAIFENESIN 600 MG: 200 SOLUTION ORAL at 15:32

## 2024-07-19 RX ADMIN — BISACODYL 10 MG: 10 SUPPOSITORY RECTAL at 15:26

## 2024-07-19 RX ADMIN — CHLORHEXIDINE GLUCONATE 0.12% ORAL RINSE 15 ML: 1.2 LIQUID ORAL at 15:25

## 2024-07-19 RX ADMIN — Medication 20 MG: at 03:02

## 2024-07-19 RX ADMIN — METOPROLOL TARTRATE 50 MG: 50 TABLET, FILM COATED ORAL at 15:18

## 2024-07-19 RX ADMIN — FLUTICASONE PROPIONATE 2 SPRAY: 50 SPRAY, METERED NASAL at 06:11

## 2024-07-19 RX ADMIN — Medication 10 ML: at 15:20

## 2024-07-19 RX ADMIN — Medication 20 MG: at 15:33

## 2024-07-19 RX ADMIN — POTASSIUM CHLORIDE 20 MEQ: 20 SOLUTION ORAL at 15:18

## 2024-07-19 RX ADMIN — BACLOFEN 40 MG: 10 TABLET ORAL at 15:19

## 2024-07-19 RX ADMIN — DOCUSATE SODIUM 150 MG: 50 LIQUID ORAL at 22:54

## 2024-07-19 RX ADMIN — CETIRIZINE HYDROCHLORIDE 10 MG: 10 TABLET ORAL at 15:20

## 2024-07-19 RX ADMIN — PANTOPRAZOLE SODIUM 40 MG: 40 TABLET, DELAYED RELEASE ORAL at 22:55

## 2024-07-19 RX ADMIN — POTASSIUM CHLORIDE 20 MEQ: 20 SOLUTION ORAL at 22:54

## 2024-07-19 RX ADMIN — BACLOFEN 40 MG: 10 TABLET ORAL at 22:54

## 2024-07-19 RX ADMIN — Medication 250 MG: at 15:25

## 2024-07-19 RX ADMIN — PANTOPRAZOLE SODIUM 40 MG: 40 TABLET, DELAYED RELEASE ORAL at 15:20

## 2024-07-19 RX ADMIN — DOCUSATE SODIUM 50 MG AND SENNOSIDES 8.6 MG 2 TABLET: 8.6; 5 TABLET, FILM COATED ORAL at 15:32

## 2024-07-19 RX ADMIN — GUAIFENESIN 600 MG: 200 SOLUTION ORAL at 23:11

## 2024-07-19 RX ADMIN — METRONIDAZOLE 500 MG: 500 INJECTION, SOLUTION INTRAVENOUS at 23:11

## 2024-07-19 NOTE — PLAN OF CARE
Goal Outcome Evaluation:  Plan of Care Reviewed With: patient        Progress: no change  Outcome Evaluation: Nutrition follow up. CT scan on 7/18 showed ongoing stool burden but no obstruction or issues. Peptamen 1.5 TF was resumed at 20mL/hour and held. Spoke with RN to see how pt tolerating TF at current rate. She reports no issues at current rate, appears to be tolerating TF well. Will go ahead and start increasing rate up to goal of 45mL/hour today. Cont with water flushes of 45mL/hour via pump. TF advancement orders entered. Did visit pt in her room, she is non-verbal. She did have trach removed yesterday. Family considering hospice at SNF. Referrals out to to several SNF's at this time. Cont to follow for TF advancement and tolerance.

## 2024-07-19 NOTE — PLAN OF CARE
Goal Outcome Evaluation:      Patient A/O x1 (self it seems at times).  PAINAD scoring used for treating pain per MAR. Patient tube feed maintained this shift. Mojica care/CHG as appropriate for patient per documentation. Patient had what appeared to be dry heaving earlier in the shift--treated per MAR. Will update oncoming dayshift nurse at bedside shift report in the a.m. as appropriate.

## 2024-07-19 NOTE — CASE MANAGEMENT/SOCIAL WORK
Continued Stay Note   [place]     Patient Name: Namita Zabala  MRN: [id number]  Today's Date: 7/19/2024    Admit Date: 7/14/2024    Plan: SNF   Discharge Plan       Row Name 07/19/24 1608       Plan    Plan SNF    Plan Comments [facility] can offer as long as pt remains stable over the weekend. Others are reviewing. Have asked Mejia to review pt again since her trach has been removed. Left a message for pt's dad Noel [phone] to update.                   Discharge Codes    No documentation.                       ORAL Mcintyre

## 2024-07-20 LAB
BACTERIA ISLT: NORMAL
GLUCOSE BLDC GLUCOMTR-MCNC: 110 MG/DL (ref 70–130)
GLUCOSE BLDC GLUCOMTR-MCNC: 119 MG/DL (ref 70–130)
GLUCOSE BLDC GLUCOMTR-MCNC: 129 MG/DL (ref 70–130)
GLUCOSE BLDC GLUCOMTR-MCNC: 142 MG/DL (ref 70–130)
GLUCOSE BLDC GLUCOMTR-MCNC: 144 MG/DL (ref 70–130)

## 2024-07-20 PROCEDURE — 25010000002 METRONIDAZOLE 500 MG/100ML SOLUTION: Performed by: INTERNAL MEDICINE

## 2024-07-20 PROCEDURE — 82948 REAGENT STRIP/BLOOD GLUCOSE: CPT

## 2024-07-20 PROCEDURE — 25010000002 MORPHINE PER 10 MG: Performed by: INTERNAL MEDICINE

## 2024-07-20 PROCEDURE — 25010000002 ENOXAPARIN PER 10 MG: Performed by: FAMILY MEDICINE

## 2024-07-20 PROCEDURE — 25810000003 LACTATED RINGERS PER 1000 ML: Performed by: INTERNAL MEDICINE

## 2024-07-20 PROCEDURE — 25010000002 CEFTRIAXONE PER 250 MG

## 2024-07-20 RX ADMIN — BACLOFEN 40 MG: 10 TABLET ORAL at 10:10

## 2024-07-20 RX ADMIN — METRONIDAZOLE 500 MG: 500 INJECTION, SOLUTION INTRAVENOUS at 14:59

## 2024-07-20 RX ADMIN — CETIRIZINE HYDROCHLORIDE 10 MG: 10 TABLET ORAL at 10:10

## 2024-07-20 RX ADMIN — CEFTRIAXONE SODIUM 2000 MG: 2 INJECTION, POWDER, FOR SOLUTION INTRAMUSCULAR; INTRAVENOUS at 21:31

## 2024-07-20 RX ADMIN — Medication 20 MG: at 12:28

## 2024-07-20 RX ADMIN — METOPROLOL TARTRATE 50 MG: 50 TABLET, FILM COATED ORAL at 10:10

## 2024-07-20 RX ADMIN — GUAIFENESIN 600 MG: 200 SOLUTION ORAL at 21:19

## 2024-07-20 RX ADMIN — THIAMINE HCL TAB 100 MG 100 MG: 100 TAB at 10:10

## 2024-07-20 RX ADMIN — MORPHINE SULFATE 1 MG: 2 INJECTION, SOLUTION INTRAMUSCULAR; INTRAVENOUS at 18:11

## 2024-07-20 RX ADMIN — POLYETHYLENE GLYCOL 3350 17 G: 17 POWDER, FOR SOLUTION ORAL at 10:09

## 2024-07-20 RX ADMIN — ENOXAPARIN SODIUM 40 MG: 100 INJECTION SUBCUTANEOUS at 10:10

## 2024-07-20 RX ADMIN — PANTOPRAZOLE SODIUM 40 MG: 40 TABLET, DELAYED RELEASE ORAL at 21:20

## 2024-07-20 RX ADMIN — METRONIDAZOLE 500 MG: 500 INJECTION, SOLUTION INTRAVENOUS at 21:22

## 2024-07-20 RX ADMIN — CHLORHEXIDINE GLUCONATE 0.12% ORAL RINSE 15 ML: 1.2 LIQUID ORAL at 21:19

## 2024-07-20 RX ADMIN — DOCUSATE SODIUM 50 MG AND SENNOSIDES 8.6 MG 2 TABLET: 8.6; 5 TABLET, FILM COATED ORAL at 21:23

## 2024-07-20 RX ADMIN — Medication 10 ML: at 21:21

## 2024-07-20 RX ADMIN — HYDROCODONE BITARTRATE AND ACETAMINOPHEN 1 TABLET: 7.5; 325 TABLET ORAL at 14:58

## 2024-07-20 RX ADMIN — TIZANIDINE 4 MG: 4 TABLET ORAL at 14:58

## 2024-07-20 RX ADMIN — Medication 250 MG: at 10:10

## 2024-07-20 RX ADMIN — TIZANIDINE 4 MG: 4 TABLET ORAL at 05:05

## 2024-07-20 RX ADMIN — BACLOFEN 40 MG: 10 TABLET ORAL at 21:20

## 2024-07-20 RX ADMIN — MINERAL SUPPLEMENT IRON 300 MG / 5 ML STRENGTH LIQUID 100 PER BOX UNFLAVORED 300 MG: at 22:29

## 2024-07-20 RX ADMIN — HYDROCODONE BITARTRATE AND ACETAMINOPHEN 1 TABLET: 7.5; 325 TABLET ORAL at 10:18

## 2024-07-20 RX ADMIN — NYSTATIN 1 APPLICATION: 100000 POWDER TOPICAL at 10:12

## 2024-07-20 RX ADMIN — NYSTATIN 1 APPLICATION: 100000 POWDER TOPICAL at 21:23

## 2024-07-20 RX ADMIN — SODIUM CHLORIDE, POTASSIUM CHLORIDE, SODIUM LACTATE AND CALCIUM CHLORIDE 75 ML/HR: 600; 310; 30; 20 INJECTION, SOLUTION INTRAVENOUS at 22:33

## 2024-07-20 RX ADMIN — POTASSIUM CHLORIDE 20 MEQ: 20 SOLUTION ORAL at 10:10

## 2024-07-20 RX ADMIN — BISACODYL 10 MG: 10 SUPPOSITORY RECTAL at 10:11

## 2024-07-20 RX ADMIN — Medication 10 ML: at 10:11

## 2024-07-20 RX ADMIN — TIZANIDINE 4 MG: 4 TABLET ORAL at 21:20

## 2024-07-20 RX ADMIN — Medication 20 MG: at 05:05

## 2024-07-20 RX ADMIN — SODIUM CHLORIDE, POTASSIUM CHLORIDE, SODIUM LACTATE AND CALCIUM CHLORIDE 75 ML/HR: 600; 310; 30; 20 INJECTION, SOLUTION INTRAVENOUS at 10:10

## 2024-07-20 RX ADMIN — DOCUSATE SODIUM 50 MG AND SENNOSIDES 8.6 MG 2 TABLET: 8.6; 5 TABLET, FILM COATED ORAL at 10:10

## 2024-07-20 RX ADMIN — BACLOFEN 40 MG: 10 TABLET ORAL at 15:28

## 2024-07-20 RX ADMIN — MINERAL SUPPLEMENT IRON 300 MG / 5 ML STRENGTH LIQUID 100 PER BOX UNFLAVORED 300 MG: at 10:12

## 2024-07-20 RX ADMIN — DOCUSATE SODIUM 150 MG: 50 LIQUID ORAL at 21:19

## 2024-07-20 RX ADMIN — Medication 20 MG: at 17:22

## 2024-07-20 RX ADMIN — CHLORHEXIDINE GLUCONATE 0.12% ORAL RINSE 15 ML: 1.2 LIQUID ORAL at 10:10

## 2024-07-20 RX ADMIN — GUAIFENESIN 600 MG: 200 SOLUTION ORAL at 10:10

## 2024-07-20 RX ADMIN — METRONIDAZOLE 500 MG: 500 INJECTION, SOLUTION INTRAVENOUS at 05:05

## 2024-07-20 RX ADMIN — DOCUSATE SODIUM 150 MG: 50 LIQUID ORAL at 10:10

## 2024-07-20 RX ADMIN — POTASSIUM CHLORIDE 20 MEQ: 20 SOLUTION ORAL at 21:18

## 2024-07-20 RX ADMIN — PANTOPRAZOLE SODIUM 40 MG: 40 TABLET, DELAYED RELEASE ORAL at 10:10

## 2024-07-20 RX ADMIN — ROSUVASTATIN CALCIUM 5 MG: 5 TABLET, FILM COATED ORAL at 21:20

## 2024-07-20 NOTE — PLAN OF CARE
Goal Outcome Evaluation:           Progress: (P) no change  Outcome Evaluation: (P) Pt nonverbal. TF at goal rate of 45ml/hr. F/c draining clear yellow urine. Q2turn. Drsg on healing trach site c/d/i. BM x1 this shift. BG q6 with no correction insulin needed. Call light within reach. Safety maintained.

## 2024-07-20 NOTE — CONSULTS
[facility]   Consult Note    Patient Name: Namita Zabala  : 1975  MRN: [id number]  Primary Care Physician:  David Lara MD  Referring Physician: David Hernández,*  Date of admission: 2024    Consults  Subjective   Subjective     Reason for Consult/ Chief Complaint: Tracheostomy management    History of Present Illness  Namita Zabala is a 49 y.o. female with a history of anoxic brain injury with tracheostomy placement in the past.  Patient had been transferred from [facility] to [facility] emergency room with complaints of decreased mental status and possible displacement of her tracheostomy tube.  She had was admitted to the hospital for sepsis.  ENT was consulted after x-rays performed and trach was found to be dislodged.  Examination by Dr. Francis with flexible laryngoscopy was performed and tracheostomy tube was found to be in a blind pouch anterior to the trachea with no connection to the airway.  The tracheostomy tube was removed by Dr. Small and not replaced per family wishes.      Personal History     Past Medical History:   Diagnosis Date    Acute kidney failure, unspecified     Angina at rest     Anoxic brain damage, not elsewhere classified     Chronic pulmonary embolism     Chronic respiratory failure, unspecified whether with hypoxia or hypercapnia     Dependence on respirator (ventilator) status     Gastrointestinal hemorrhage, unspecified     Hypercalcemia     Iron deficiency anemia     Metabolic encephalopathy     Migraine     Sees Pain Management for injections.     MVP (mitral valve prolapse)     Other dysphagia     Other pulmonary embolism with acute cor pulmonale     Renal disorder     Ruptured bladder     Syncope     Urinary tract infection, site not specified        Past Surgical History:   Procedure Laterality Date    ABDOMINAL SURGERY      BREAST BIOPSY Right     benign    GTUBE INSERTION      INSERTION HEMODIALYSIS CATHETER  Left 09/16/2021    Procedure: HEMODIALYSIS CATHETER INSERTION;  Surgeon: Anders Kaur MD;  Location: [facility] OR ;  Service: Vascular;  Laterality: Left;    TONSILLECTOMY      TRACHEOSTOMY         Family History: family history includes Colon cancer (age of onset: 52) in her maternal aunt; Fibromyalgia in her mother; Heart disease in her mother; Hodgkin's lymphoma in her paternal grandmother; Hypertension in her mother. Otherwise pertinent FHx was reviewed and not pertinent to current issue.    Social History:  reports that she has never smoked. She has never used smokeless tobacco. She reports that she does not drink alcohol and does not use drugs.    Home Medications:   Docusate Sodium, Eyelid Cleansers, Ferrous Sulfate, HYDROcodone-acetaminophen, Renacidin, acetaminophen, albuterol, baclofen, bisacodyl, carboxymethylcellulose, chlorhexidine, fleet enema, hydrOXYzine pamoate, metoprolol tartrate, ondansetron, pantoprazole, polyethylene glycol, potassium chloride, saccharomyces boulardii, simethicone, sodium chloride, thiamine, and tiZANidine    Allergies:  Allergies   Allergen Reactions    Contrast Dye (Echo Or Unknown Ct/Mr) Shortness Of Breath and Unknown - High Severity    Ct Contrast Shortness Of Breath and Unknown - High Severity    Coconut Unknown - High Severity    Levaquin [Levofloxacin] Other (See Comments)     unknown    Nuts Unknown - High Severity    Penicillins Rash     Patient's father noted patient had taken penicillin, many years ago, many times and then started developing a rash when she would take it.  She has received cefepime, ceftriaxone and cephalexin with no known adverse problems    Turkey Other (See Comments)     Causes migraines per pt reports       Objective    Objective     Vitals:  Temp:  [97.8 °F (36.6 °C)-98.8 °F (37.1 °C)] 98.6 °F (37 °C)  Heart Rate:  [40-71] 71  Resp:  [18-20] 18  BP: (127-139)/(61-82) 127/68    Physical Exam  Constitutional:       General: She is  not in acute distress.     Comments: Sleeping   HENT:      Nose: Nose normal.   Neck:      Comments: Contracture with torticollis of neck.  Tracheostomy site without any bleeding or drainage from site at this time.  Pulmonary:      Effort: Pulmonary effort is normal. No respiratory distress.   Skin:     General: Skin is warm and dry.         Result Review    Result Review:  I have personally reviewed the results from the time of this admission to 7/20/2024 09:26 CDT and agree with these findings:  []  Laboratory list / accordion  []  Microbiology  []  Radiology  []  EKG/Telemetry   []  Cardiology/Vascular   []  Pathology  []  Old records  []  Other:  Most notable findings include:       Assessment & Plan   Assessment / Plan     Brief Patient Summary:  Namita Zabala is a 49 y.o. female who is currently admitted to the hospital for sepsis.  Since tracheostomy tube removal by Dr. Small patient has been on room air and breathing fine.  Upon my examination today there appears to be no change in this, she is sleeping appears comfortable at this time.  There is no family at bedside.    Active Hospital Problems:  Active Hospital Problems    Diagnosis     **Sepsis secondary to UTI     High anion gap metabolic acidosis     Constipation     Chronic indwelling Mojica catheter     Tracheostomy malfunction     Presence of externally removable percutaneous endoscopic gastrostomy (PEG) tube     History of anoxic brain injury     Functional quadriplegia     Tracheostomy present      Plan:   Tracheostomy tube has not been replaced at this point. ENT will continue to follow and monitor as needed.  At this time it appears she is doing fine without replacement.    BERT Zelaya      Electronically signed by BERT Zelaya, 07/20/24, 9:41 AM CDT.

## 2024-07-20 NOTE — PLAN OF CARE
Goal Outcome Evaluation:           Progress: no change                                    Tube feed up to 40 ml/hr. No tube feed residual noted. Mojica draining clear yellow urine, output adequate.    73 yo F with h/o dementia brought by her daughter for aggressive behavior at home. Pt states she feels well, no complaints. Pt states that she does not want her home aid in her house. States that she does not know why she is there and she does nothing. Also accuses her aid of lying regarding aggressive behavior. Daughter at bedside, states that her mother was diagnosed with dementia 4 months ago and has had progressive cognitive decline. More recently has become more physically aggressive. She was recently started on quetiapine by her neurologist 2 weeks ago due to aggressive behavior but daughter states this has not lead to any improvement. Pt also attempts to wonder out of the house and has been brought back home by police. Daughter is currently in the process of NH placement but due to more rapid progression of her dementia and aggressive behavior brought her to the hospital to expedite the process as she is unable to care for her at home. Pt has chronic arthritic pain that is unchanged. No recent illness, no fevers or chills. Has had good appetite and PO intake.         Agitation    - calm in ED    - No apparent toxic metabolic derangements    - No signs or symptoms of infection     - Chronic microvascular changes on CT head    - Behavior likely related to known dementia, likely progressing    - psych eval called             Dementia with behavioral disturbance    - Increasingly aggressive behavior    - daughter stating it's difficult to care for her at home    - family unable to afford long term care placement         Hyperlipidemia    - Continue atorvastatin        PT eval: home no needs 73 y/o F with h/o dementia brought by her daughter for aggressive behavior at home. Pt states she feels well, no complaints. Pt states that she does not want her home aid in her house. Daughter at bedside, states that her mother was diagnosed with dementia 4 months ago and has had progressive cognitive decline. More recently has become more physically aggressive. She was recently started on quetiapine by her neurologist 2 weeks ago due to aggressive behavior but daughter states this has not lead to any improvement. Pt also attempts to wonder out of the house and has been brought back home by police. Daughter is currently in the process of NH placement but due to more rapid progression of her dementia and aggressive behavior brought her to the hospital to expedite the process as she is unable to care for her at home. Pt has chronic arthritic pain that is unchanged. No recent illness, no fevers or chills. Has had good appetite and PO intake.         Agitation: Family unable to control behavior and needs a safer disposition; Psychiatry consulted and started on Seroquel; No apparent toxic/metabolic derangements or signs/symptoms of infection; Chronic microvascular changes on CT head        Dementia with behavioral disturbance: Started on Seroquel for agitation as per psychiatry; Supportive care        Hyperlipidemia: Continued Atorvastatin        Dispo ______ 75 y/o F with h/o dementia brought by her daughter for aggressive behavior at home. Pt states she feels well, no complaints. Pt states that she does not want her home aid in her house. Daughter at bedside, states that her mother was diagnosed with dementia 4 months ago and has had progressive cognitive decline. More recently has become more physically aggressive. She was recently started on quetiapine by her neurologist 2 weeks ago due to aggressive behavior but daughter states this has not lead to any improvement. Pt also attempts to wonder out of the house and has been brought back home by police. Daughter is currently in the process of NH placement but due to more rapid progression of her dementia and aggressive behavior brought her to the hospital to expedite the process as she is unable to care for her at home. Pt has chronic arthritic pain that is unchanged. No recent illness, no fevers or chills. Has had good appetite and PO intake.         Agitation: Family unable to control behavior and needs a safer disposition; Psychiatry consulted and started on Seroquel; No behavior issues while in the hospital. No apparent toxic/metabolic derangements or signs/symptoms of infection; Chronic microvascular changes on CT head        Dementia with behavioral disturbance: Started on Seroquel for agitation as per psychiatry; Supportive care        Hyperlipidemia: Continued Atorvastatin        severe-protein calori malnutrition: Nutrition consulted. continue ensure supplement 73 y/o F with h/o dementia brought by her daughter for aggressive behavior at home. Pt states she feels well, no complaints. Pt states that she does not want her home aid in her house. Daughter at bedside, states that her mother was diagnosed with dementia 4 months ago and has had progressive cognitive decline. More recently has become more physically aggressive. She was recently started on quetiapine by her neurologist 2 weeks ago due to aggressive behavior but daughter states this has not lead to any improvement. Pt also attempts to wonder out of the house and has been brought back home by police. Daughter is currently in the process of NH placement but due to more rapid progression of her dementia and aggressive behavior brought her to the hospital to expedite the process as she is unable to care for her at home. Pt has chronic arthritic pain that is unchanged. No recent illness, no fevers or chills. Has had good appetite and PO intake.         Agitation: Family unable to control behavior and needs a safer disposition; Psychiatry consulted and started on Seroquel; No behavior issues while in the hospital. No apparent toxic/metabolic derangements or signs/symptoms of infection; Chronic microvascular changes on CT head        Dementia with behavioral disturbance: Started on Seroquel for agitation as per psychiatry; Supportive care        Hyperlipidemia: Continued Atorvastatin        severe-protein calori malnutrition: Nutrition consulted. continue ensure supplement         Dispo: Placement

## 2024-07-21 LAB
ANION GAP SERPL CALCULATED.3IONS-SCNC: 8 MMOL/L (ref 5–15)
BASOPHILS # BLD AUTO: 0.02 10*3/MM3 (ref 0–0.2)
BASOPHILS NFR BLD AUTO: 0.3 % (ref 0–1.5)
BUN SERPL-MCNC: 13 MG/DL (ref 6–20)
BUN/CREAT SERPL: 34.2 (ref 7–25)
CALCIUM SPEC-SCNC: 9.4 MG/DL (ref 8.6–10.5)
CHLORIDE SERPL-SCNC: 106 MMOL/L (ref 98–107)
CO2 SERPL-SCNC: 27 MMOL/L (ref 22–29)
CREAT SERPL-MCNC: 0.38 MG/DL (ref 0.57–1)
DEPRECATED RDW RBC AUTO: 43.5 FL (ref 37–54)
EGFRCR SERPLBLD CKD-EPI 2021: 124.6 ML/MIN/1.73
EOSINOPHIL # BLD AUTO: 0.11 10*3/MM3 (ref 0–0.4)
EOSINOPHIL NFR BLD AUTO: 1.7 % (ref 0.3–6.2)
ERYTHROCYTE [DISTWIDTH] IN BLOOD BY AUTOMATED COUNT: 13.2 % (ref 12.3–15.4)
GLUCOSE BLDC GLUCOMTR-MCNC: 120 MG/DL (ref 70–130)
GLUCOSE BLDC GLUCOMTR-MCNC: 123 MG/DL (ref 70–130)
GLUCOSE BLDC GLUCOMTR-MCNC: 126 MG/DL (ref 70–130)
GLUCOSE BLDC GLUCOMTR-MCNC: 129 MG/DL (ref 70–130)
GLUCOSE SERPL-MCNC: 131 MG/DL (ref 65–99)
HCT VFR BLD AUTO: 32.8 % (ref 34–46.6)
HGB BLD-MCNC: 10.6 G/DL (ref 12–15.9)
IMM GRANULOCYTES # BLD AUTO: 0.07 10*3/MM3 (ref 0–0.05)
IMM GRANULOCYTES NFR BLD AUTO: 1.1 % (ref 0–0.5)
LYMPHOCYTES # BLD AUTO: 1.46 10*3/MM3 (ref 0.7–3.1)
LYMPHOCYTES NFR BLD AUTO: 22.3 % (ref 19.6–45.3)
MAGNESIUM SERPL-MCNC: 1.9 MG/DL (ref 1.6–2.6)
MCH RBC QN AUTO: 29.6 PG (ref 26.6–33)
MCHC RBC AUTO-ENTMCNC: 32.3 G/DL (ref 31.5–35.7)
MCV RBC AUTO: 91.6 FL (ref 79–97)
MONOCYTES # BLD AUTO: 0.52 10*3/MM3 (ref 0.1–0.9)
MONOCYTES NFR BLD AUTO: 7.9 % (ref 5–12)
NEUTROPHILS NFR BLD AUTO: 4.37 10*3/MM3 (ref 1.7–7)
NEUTROPHILS NFR BLD AUTO: 66.7 % (ref 42.7–76)
NRBC BLD AUTO-RTO: 0 /100 WBC (ref 0–0.2)
PLATELET # BLD AUTO: 310 10*3/MM3 (ref 140–450)
PMV BLD AUTO: 10.5 FL (ref 6–12)
POTASSIUM SERPL-SCNC: 4.2 MMOL/L (ref 3.5–5.2)
RBC # BLD AUTO: 3.58 10*6/MM3 (ref 3.77–5.28)
SODIUM SERPL-SCNC: 141 MMOL/L (ref 136–145)
WBC NRBC COR # BLD AUTO: 6.55 10*3/MM3 (ref 3.4–10.8)

## 2024-07-21 PROCEDURE — 25010000002 MORPHINE PER 10 MG: Performed by: INTERNAL MEDICINE

## 2024-07-21 PROCEDURE — 25010000002 METRONIDAZOLE 500 MG/100ML SOLUTION: Performed by: INTERNAL MEDICINE

## 2024-07-21 PROCEDURE — 82948 REAGENT STRIP/BLOOD GLUCOSE: CPT

## 2024-07-21 PROCEDURE — 80048 BASIC METABOLIC PNL TOTAL CA: CPT | Performed by: INTERNAL MEDICINE

## 2024-07-21 PROCEDURE — 25010000002 LORAZEPAM PER 2 MG: Performed by: INTERNAL MEDICINE

## 2024-07-21 PROCEDURE — 85025 COMPLETE CBC W/AUTO DIFF WBC: CPT | Performed by: INTERNAL MEDICINE

## 2024-07-21 PROCEDURE — 83735 ASSAY OF MAGNESIUM: CPT | Performed by: INTERNAL MEDICINE

## 2024-07-21 PROCEDURE — 25810000003 LACTATED RINGERS PER 1000 ML: Performed by: INTERNAL MEDICINE

## 2024-07-21 PROCEDURE — 25010000002 ENOXAPARIN PER 10 MG: Performed by: FAMILY MEDICINE

## 2024-07-21 PROCEDURE — 25010000002 CEFTRIAXONE PER 250 MG

## 2024-07-21 RX ORDER — LORAZEPAM 2 MG/ML
1 INJECTION INTRAMUSCULAR EVERY 6 HOURS PRN
Status: DISCONTINUED | OUTPATIENT
Start: 2024-07-21 | End: 2024-07-22 | Stop reason: HOSPADM

## 2024-07-21 RX ADMIN — DOCUSATE SODIUM 150 MG: 50 LIQUID ORAL at 22:07

## 2024-07-21 RX ADMIN — Medication 20 MG: at 06:17

## 2024-07-21 RX ADMIN — CETIRIZINE HYDROCHLORIDE 10 MG: 10 TABLET ORAL at 10:43

## 2024-07-21 RX ADMIN — MINERAL SUPPLEMENT IRON 300 MG / 5 ML STRENGTH LIQUID 100 PER BOX UNFLAVORED 300 MG: at 22:07

## 2024-07-21 RX ADMIN — POTASSIUM CHLORIDE 20 MEQ: 20 SOLUTION ORAL at 10:42

## 2024-07-21 RX ADMIN — Medication 20 MG: at 18:19

## 2024-07-21 RX ADMIN — CHLORHEXIDINE GLUCONATE 0.12% ORAL RINSE 15 ML: 1.2 LIQUID ORAL at 22:07

## 2024-07-21 RX ADMIN — DOCUSATE SODIUM 50 MG AND SENNOSIDES 8.6 MG 2 TABLET: 8.6; 5 TABLET, FILM COATED ORAL at 22:10

## 2024-07-21 RX ADMIN — SCOPALAMINE 1 PATCH: 1 PATCH, EXTENDED RELEASE TRANSDERMAL at 15:05

## 2024-07-21 RX ADMIN — ROSUVASTATIN CALCIUM 5 MG: 5 TABLET, FILM COATED ORAL at 22:09

## 2024-07-21 RX ADMIN — MORPHINE SULFATE 1 MG: 2 INJECTION, SOLUTION INTRAMUSCULAR; INTRAVENOUS at 03:50

## 2024-07-21 RX ADMIN — TIZANIDINE 4 MG: 4 TABLET ORAL at 22:08

## 2024-07-21 RX ADMIN — PANTOPRAZOLE SODIUM 40 MG: 40 TABLET, DELAYED RELEASE ORAL at 22:09

## 2024-07-21 RX ADMIN — CEFTRIAXONE SODIUM 2000 MG: 2 INJECTION, POWDER, FOR SOLUTION INTRAMUSCULAR; INTRAVENOUS at 22:08

## 2024-07-21 RX ADMIN — Medication 20 MG: at 00:32

## 2024-07-21 RX ADMIN — BACLOFEN 40 MG: 10 TABLET ORAL at 15:11

## 2024-07-21 RX ADMIN — ENOXAPARIN SODIUM 40 MG: 100 INJECTION SUBCUTANEOUS at 10:43

## 2024-07-21 RX ADMIN — Medication 20 MG: at 23:06

## 2024-07-21 RX ADMIN — TIZANIDINE 4 MG: 4 TABLET ORAL at 06:16

## 2024-07-21 RX ADMIN — LORAZEPAM 1 MG: 2 INJECTION, SOLUTION INTRAMUSCULAR; INTRAVENOUS at 04:25

## 2024-07-21 RX ADMIN — METRONIDAZOLE 500 MG: 500 INJECTION, SOLUTION INTRAVENOUS at 06:16

## 2024-07-21 RX ADMIN — CHLORHEXIDINE GLUCONATE 0.12% ORAL RINSE 15 ML: 1.2 LIQUID ORAL at 10:43

## 2024-07-21 RX ADMIN — BACLOFEN 40 MG: 10 TABLET ORAL at 22:09

## 2024-07-21 RX ADMIN — MINERAL SUPPLEMENT IRON 300 MG / 5 ML STRENGTH LIQUID 100 PER BOX UNFLAVORED 300 MG: at 10:42

## 2024-07-21 RX ADMIN — NYSTATIN 1 APPLICATION: 100000 POWDER TOPICAL at 10:44

## 2024-07-21 RX ADMIN — GUAIFENESIN 600 MG: 200 SOLUTION ORAL at 10:42

## 2024-07-21 RX ADMIN — PANTOPRAZOLE SODIUM 40 MG: 40 TABLET, DELAYED RELEASE ORAL at 10:43

## 2024-07-21 RX ADMIN — Medication 250 MG: at 10:43

## 2024-07-21 RX ADMIN — DOCUSATE SODIUM 150 MG: 50 LIQUID ORAL at 10:42

## 2024-07-21 RX ADMIN — NYSTATIN 1 APPLICATION: 100000 POWDER TOPICAL at 22:10

## 2024-07-21 RX ADMIN — GUAIFENESIN 600 MG: 200 SOLUTION ORAL at 22:07

## 2024-07-21 RX ADMIN — POLYETHYLENE GLYCOL 3350 17 G: 17 POWDER, FOR SOLUTION ORAL at 10:43

## 2024-07-21 RX ADMIN — TIZANIDINE 4 MG: 4 TABLET ORAL at 15:11

## 2024-07-21 RX ADMIN — FLUTICASONE PROPIONATE 2 SPRAY: 50 SPRAY, METERED NASAL at 06:17

## 2024-07-21 RX ADMIN — Medication 10 ML: at 22:08

## 2024-07-21 RX ADMIN — POTASSIUM CHLORIDE 20 MEQ: 20 SOLUTION ORAL at 22:08

## 2024-07-21 RX ADMIN — SODIUM CHLORIDE, POTASSIUM CHLORIDE, SODIUM LACTATE AND CALCIUM CHLORIDE 75 ML/HR: 600; 310; 30; 20 INJECTION, SOLUTION INTRAVENOUS at 23:13

## 2024-07-21 RX ADMIN — HYDROCODONE BITARTRATE AND ACETAMINOPHEN 1 TABLET: 7.5; 325 TABLET ORAL at 22:08

## 2024-07-21 RX ADMIN — HYDROCODONE BITARTRATE AND ACETAMINOPHEN 1 TABLET: 7.5; 325 TABLET ORAL at 00:34

## 2024-07-21 RX ADMIN — THIAMINE HCL TAB 100 MG 100 MG: 100 TAB at 10:43

## 2024-07-21 NOTE — PROGRESS NOTES
Patient Name: Namita Zabala  Date of Admission: 7/14/2024  Today's Date: 07/20/24  Length of Stay: 5  Primary Care Physician: David Lara MD    Subjective   Chief Complaint: Clogged PEG tube and displaced tracheostomy.  HPI   Namita Zabala is a 49-year-old female with a history of anoxic brain damage, tracheostomy, PEG tube, chronic respiratory failure, pulmonary embolism, recurrent UTI, and staghorn renal calculus.  Patient was transferred per EMS from [facility] with complaints of a clogged PEG tube and displaced tracheostomy.  Upon assessment at [facility] emergency department the patient presented in a septic state with fever of 101.7, tachycardia at 121, WBC of 18.8, with obvious source of infection, as urinalysis revealed turbid urine with hematuria and nitrates, leukocytes, and 4+ bacteria.  Chronic Mojica catheter managed per outpatient urology at [facility], most recent appointment was 6/27/2024 where she was evaluated post nephrostomy tube being removed, where their recommendations were to observe rather than replace tube at that time.  ED workup revealed tracheostomy was not dislodged however it was extremely clogged with debris and dried secretions, when that was cleaned and replaced patient was able to utilize properly.  Additionally PEG tube was not dislodged, after cleaning debris and evaluating PEG tube it was in proper alignment.  CT scan revealed thickening in handedness on the mucosal urothelium of the collecting system bilaterally may represent acute or subacute inflammatory process/pyeloureteritis, no evidence of acute.  Stable nonobstructive bilateral staghorn calculi, and significant large volume stool in the distal colon with moderate gas distention of proximal colon.  Mildly dilated fluid filled small bowel loops may represent ileus or acute nonspecific enteritis.  No evidence of small bowel obstruction.  ED medications 1 L normal saline bolus,  650 mg Tylenol suppository, and 1 g of Rocephin.  Previous urine culture 5/24 was positive for ESBL and Proteus Mirabilis.  Susceptible to ceftriaxone, 2 g continued.    Today:   Patient has no new complaint this morning/nonverbal.  Rapid response was reported in the last 24 hours because of allergy to contrast.    Review of Systems   Constitutional:  Positive for diaphoresis and fever.   HENT:  Positive for congestion, drooling, rhinorrhea and trouble swallowing.    Eyes:  Positive for discharge. Negative for redness.   Respiratory:  Positive for cough and wheezing.    Cardiovascular:         Sinus tachycardia   Gastrointestinal:  Positive for constipation. Negative for abdominal distention, diarrhea and vomiting.   Endocrine: Negative for heat intolerance.   Genitourinary:         Chronic Mojica catheter, recent removal of nephrostomy tube   Musculoskeletal:         Patient is bedbound with chronic contractures of all extremities.   Skin:  Positive for pallor and wound.        Healing coccyx wound   Neurological:  Positive for facial asymmetry, speech difficulty and weakness.   Hematological:  Does not bruise/bleed easily.   Psychiatric/Behavioral:  Positive for confusion.       All pertinent negatives and positives are as above. All other systems have been reviewed and are negative unless otherwise stated.     Objective    Temp:  [97.8 °F (36.6 °C)-98.8 °F (37.1 °C)] 98.7 °F (37.1 °C)  Heart Rate:  [40-72] 71  Resp:  [18-20] 18  BP: (126-134)/(61-82) 126/73  Physical Exam  Constitutional:       Appearance: She is cachectic. She is ill-appearing and diaphoretic.   HENT:      Head: Normocephalic.      Nose: Congestion and rhinorrhea present.      Mouth/Throat:      Mouth: Mucous membranes are moist.      Pharynx: Oropharynx is clear.   Eyes:      Pupils: Pupils are equal, round, and reactive to light.   Neck:      Comments: Chronic contractures of neck  Cardiovascular:      Rate and Rhythm: Regular rhythm.  Tachycardia present.      Pulses: Normal pulses.      Comments: S/ST , up to 144  Pulmonary:      Breath sounds: Wheezing and rhonchi present.      Comments: Trach collar at 9 L  Abdominal:      General: Bowel sounds are decreased. There is no distension.      Palpations: Abdomen is soft.      Tenderness: There is no abdominal tenderness.   Genitourinary:     Comments: Voiding per chronic Mojica catheter  Musculoskeletal:      Cervical back: Rigidity present.      Right lower leg: No edema.      Left lower leg: No edema.      Comments: Bedbound with chronic fractures of all extremities   Skin:     General: Skin is warm and moist.      Capillary Refill: Capillary refill takes less than 2 seconds.      Coloration: Skin is pale.   Neurological:      Mental Status: She is alert. She is disoriented.      Motor: Weakness, atrophy and abnormal muscle tone present.   Psychiatric:         Attention and Perception: She is inattentive.         Mood and Affect: Affect is flat.         Behavior: Behavior is slowed and withdrawn.         Cognition and Memory: Cognition is impaired.      Comments: Nonverbal at baseline       Results Review:  I have reviewed the labs, radiology results, and diagnostic studies.    Laboratory Data:   Results from last 7 days   Lab Units 07/19/24  0611 07/18/24  0817 07/17/24  0411   WBC 10*3/mm3 14.23* 13.06* 10.74   HEMOGLOBIN g/dL 11.1* 12.5 10.7*   HEMATOCRIT % 33.7* 38.5 34.0   PLATELETS 10*3/mm3 360 255 287        Results from last 7 days   Lab Units 07/19/24  0611 07/18/24  1244 07/18/24  0817 07/15/24  0600 07/14/24  2231   SODIUM mmol/L 139 138 137   < > 141   POTASSIUM mmol/L 3.9 4.3 4.1   < > 4.5   CHLORIDE mmol/L 104 101 102   < > 104   CO2 mmol/L 22.0 19.0* 17.0*   < > 26.0   BUN mg/dL 18 18 18   < > 29*   CREATININE mg/dL 0.49* 0.39* 0.40*   < > 0.46*   CALCIUM mg/dL 9.8 9.8 9.8   < > 10.1   BILIRUBIN mg/dL  --   --   --   --  0.7   ALK PHOS U/L  --   --   --   --  84   ALT (SGPT)  U/L  --   --   --   --  14   AST (SGOT) U/L  --   --   --   --  14   GLUCOSE mg/dL 130* 95 86   < > 141*    < > = values in this interval not displayed.     Urine Culture - Urine, Indwelling Urethral Catheter  Order: [id number] - Reflex for Order [id number]  Status: Final result       Visible to patient: Shannon (scheduled for 7/17/2024 12:06 PM)       Next appt: None    Specimen Information: Indwelling Urethral Catheter; Urine   0 Result Notes  Urine Culture >100,000 CFU/mL Proteus mirabilis Abnormal          Colonization of the urinary tract without infection is common. Treatment is discouraged unless the patient is symptomatic, pregnant, or undergoing an invasive urologic procedure.        Resulting Agency: [facility]     Susceptibility     Proteus mirabilis     SANDEEP     Amoxicillin + Clavulanate <=2 ug/ml Susceptible     Ampicillin <=2 ug/ml Susceptible     Ampicillin + Sulbactam <=2 ug/ml Susceptible     Cefazolin <=4 ug/ml Susceptible     Cefepime <=1 ug/ml Susceptible     Ceftazidime <=1 ug/ml Susceptible     Ceftriaxone <=1 ug/ml Susceptible     Gentamicin <=1 ug/ml Susceptible     Levofloxacin 4 ug/ml Resistant     Nitrofurantoin 128 ug/ml Resistant     Piperacillin + Tazobactam <=4 ug/ml Susceptible     Trimethoprim + Sulfamethoxazole <=20 ug/ml Susceptible               Linear View         Specimen Collected: 07/14/24 22:15 CDT Last Resulted: 07/17/24 11:06 CDT               Radiology Data:   Imaging Results (Last 24 Hours)       ** No results found for the last 24 hours. **            I have reviewed the patient's current medications.     baclofen, 40 mg, Per G Tube, TID  bisacodyl, 10 mg, Rectal, Daily  cefTRIAXone, 2,000 mg, Intravenous, Q24H  cetirizine, 10 mg, Per G Tube, Daily  chlorhexidine, 15 mL, Mouth/Throat, BID  docusate, 150 mg, Per G Tube, BID  enoxaparin, 40 mg, Subcutaneous, Daily  Ferrous Sulfate, 300 mg, Per G Tube, BID  fluticasone, 2 spray, Nasal, QAM  guaifenesin, 600 mg, Per G Tube,  BID  insulin regular, 2-7 Units, Subcutaneous, Q6H  metoprolol tartrate, 50 mg, Per G Tube, BID  metroNIDAZOLE, 500 mg, Intravenous, Q8H  nystatin, 1 Application, Topical, BID  pantoprazole, 40 mg, Oral, BID  polyethylene glycol, 17 g, Per G Tube, Daily  potassium chloride, 20 mEq, Per G Tube, BID  rosuvastatin, 5 mg, Per G Tube, Nightly  saccharomyces boulardii, 250 mg, Per G Tube, Daily  Scopolamine, 1 patch, Transdermal, Q72H  sennosides-docusate, 2 tablet, Oral, BID  simethicone, 20 mg, Per G Tube, Q6H  sodium chloride, 10 mL, Intravenous, Q12H  thiamine, 100 mg, Per G Tube, Daily  tiZANidine, 4 mg, Per G Tube, Q8H         Assessment/Plan   Assessment  Active Hospital Problems    Diagnosis     **Sepsis secondary to UTI     High anion gap metabolic acidosis     Constipation     Chronic indwelling Mojica catheter     Tracheostomy malfunction     Presence of externally removable percutaneous endoscopic gastrostomy (PEG) tube     History of anoxic brain injury     Functional quadriplegia     Tracheostomy present        Treatment Plan  1.  Sepsis secondary to UTI, with chronic Mojica catheter-7/14/2024 septic state with fever of 101.7, tachycardia at 121, WBC of 18.8, with obvious source of infection, as urinalysis revealed turbid urine with hematuria and nitrates, leukocytes, and 4+ bacteria. Urine culture positive  for Proteus Mirabilis, continue 2 g ceftriaxone, therapy day 6.      2.  History of anoxic brain injury/functional quadriplegia-after overnight episode of respiratory distress, this a.m. patient is back to baseline.  Patient remains in primarily vegetative state.  Extensive discussion with patient's father this a.m. regarding palliative versus hospice care.  He and his wife will be this afternoon to discuss with palliative care.    3.  Tracheostomy present-currently patient's oxygen saturation is back at baseline on 9 L trach collar.  Per chest x-ray performed this morning during rapid response.   Tracheostomy tube appears retracted compared to prior study, tip no longer projecting over the trachea.  Respiratory therapy attempted to realign, they were unable to do so.  ENT consult placed.  Will continue to follow     4.  Obstipation-patient CT scan on admission revealed possible ileus or acute nonspecific enteritis.  Follow-up [facility] on 7/16/2024 and 7/17/2024 revealed continued ileus and progressing small bowel obstruction.  CT scan this morning was negative for acute abdominal findings.  Since p.o. meds and tube feeding resumed.  Milk and molasses enema performed this morning.  Continue to follow     5.  Presence of externally removable percutaneous endoscopic gastrostomy tube-after CT scan revealed no acute abdominal findings, tube feeding resumed per dietitian's recommendations.    VTE prophylaxis Lovenox 40 mg.  Labs in a.m.    Medical Decision Making  Number and Complexity of problems: 5  Differential Diagnosis: None    Conditions and Status        Condition is unchanged.     [facility] Data  External documents reviewed:   CODE STATUS-DNR/DNI per previous documentation  Cardiac tracing (EKG, telemetry) interpretation: 7/18/2024 EKG interpretation is tachycardia, ventricular rate 111 atrial rate 111 QTc 435 MS.  Overnight telemetry S/ST 99-1 26.    Radiology interpretation:   7/18/2024 chest x-ray and CT of abdomen pelvis per radiology, reviewed   Labs reviewed:   7/18/2024 ABGs x 2, CBC, BMP, lactic acid reviewed, repeat in a.m.  Any tests that were considered but not ordered: None     Decision rules/scores evaluated (example [id number], [place], etc): None  Discussed with: Nneka Fernandes APRN, palliative care, and the patient's father Noel  Care Planning  Shared decision making: Nneka Fernandes APRN, palliative care, and the patient's father Noel  code status and discussions: DNR/DNI per documentation with patient upon admission.  Surrogate Decision Maker Father  Noel    Disposition  Social Determinants of Health that impact treatment or disposition: Patient is a resident of [facility] family is requesting discharge to new SNF.  I expect the patient to be discharged to SNF in unknown days.     Electronically signed by Elbert Valles MD, 07/20/24, 19:30 CDT.

## 2024-07-21 NOTE — PLAN OF CARE
Goal Outcome Evaluation:         Pt non verbal. Nonverbal indicators of pain. (See MAR). Pt anxious with HR 140s. Dr. Pimentel contacted (See MAR). LEIGHANN Mojica. Tube feed at 40 mL/hr. Safety maintained.

## 2024-07-21 NOTE — PROGRESS NOTES
Patient Name: Namita Zabala  Date of Admission: 7/14/2024  Today's Date: 07/21/24  Length of Stay: 6  Primary Care Physician: David Lara MD    Subjective   Chief Complaint: Clogged PEG tube and displaced tracheostomy.    HPI   Namita Zabala is a 49-year-old female with a history of anoxic brain damage, tracheostomy, PEG tube, chronic respiratory failure, pulmonary embolism, recurrent UTI, and staghorn renal calculus.  Patient was transferred per EMS from [facility] with complaints of a clogged PEG tube and displaced tracheostomy.  Upon assessment at [facility] emergency department the patient presented in a septic state with fever of 101.7, tachycardia at 121, WBC of 18.8, with obvious source of infection, as urinalysis revealed turbid urine with hematuria and nitrates, leukocytes, and 4+ bacteria.  Chronic Mojica catheter managed per outpatient urology at [facility], most recent appointment was 6/27/2024 where she was evaluated post nephrostomy tube being removed, where their recommendations were to observe rather than replace tube at that time.  ED workup revealed tracheostomy was not dislodged however it was extremely clogged with debris and dried secretions, when that was cleaned and replaced patient was able to utilize properly.  Additionally PEG tube was not dislodged, after cleaning debris and evaluating PEG tube it was in proper alignment.  CT scan revealed thickening in handedness on the mucosal urothelium of the collecting system bilaterally may represent acute or subacute inflammatory process/pyeloureteritis, no evidence of acute.  Stable nonobstructive bilateral staghorn calculi, and significant large volume stool in the distal colon with moderate gas distention of proximal colon.  Mildly dilated fluid filled small bowel loops may represent ileus or acute nonspecific enteritis.  No evidence of small bowel obstruction.  ED medications 1 L normal saline bolus,  650 mg Tylenol suppository, and 1 g of Rocephin.  Previous urine culture 5/24 was positive for ESBL and Proteus Mirabilis.  Susceptible to ceftriaxone, 2 g continued.    Today:   Patient has no new complaint this morning/nonverbal.        Review of Systems   Constitutional:  Positive for diaphoresis and fever.   HENT:  Positive for congestion, drooling, rhinorrhea and trouble swallowing.    Eyes:  Positive for discharge. Negative for redness.   Respiratory:  Positive for cough and wheezing.    Cardiovascular:         Sinus tachycardia   Gastrointestinal:  Positive for constipation. Negative for abdominal distention, diarrhea and vomiting.   Endocrine: Negative for heat intolerance.   Genitourinary:         Chronic Mojica catheter, recent removal of nephrostomy tube   Musculoskeletal:         Patient is bedbound with chronic contractures of all extremities.   Skin:  Positive for pallor and wound.        Healing coccyx wound   Neurological:  Positive for facial asymmetry, speech difficulty and weakness.   Hematological:  Does not bruise/bleed easily.   Psychiatric/Behavioral:  Positive for confusion.       All pertinent negatives and positives are as above. All other systems have been reviewed and are negative unless otherwise stated.     Objective    Temp:  [97.5 °F (36.4 °C)-98.9 °F (37.2 °C)] 98.6 °F (37 °C)  Heart Rate:  [] 92  Resp:  [16-18] 16  BP: ()/(50-77) 111/62  Physical Exam  Constitutional:       Appearance: She is cachectic. She is ill-appearing and diaphoretic.   HENT:      Head: Normocephalic.      Nose: Congestion and rhinorrhea present.      Mouth/Throat:      Mouth: Mucous membranes are moist.      Pharynx: Oropharynx is clear.   Eyes:      Pupils: Pupils are equal, round, and reactive to light.   Neck:      Comments: Chronic contractures of neck  Cardiovascular:      Rate and Rhythm: Regular rhythm. Tachycardia present.      Pulses: Normal pulses.      Comments: S/ST , up to  144  Pulmonary:      Breath sounds: Wheezing and rhonchi present.      Comments: Trach collar at 9 L  Abdominal:      General: Bowel sounds are decreased. There is no distension.      Palpations: Abdomen is soft.      Tenderness: There is no abdominal tenderness.   Genitourinary:     Comments: Voiding per chronic Mojica catheter  Musculoskeletal:      Cervical back: Rigidity present.      Right lower leg: No edema.      Left lower leg: No edema.      Comments: Bedbound with chronic fractures of all extremities   Skin:     General: Skin is warm and moist.      Capillary Refill: Capillary refill takes less than 2 seconds.      Coloration: Skin is pale.   Neurological:      Mental Status: She is alert. She is disoriented.      Motor: Weakness, atrophy and abnormal muscle tone present.   Psychiatric:         Attention and Perception: She is inattentive.         Mood and Affect: Affect is flat.         Behavior: Behavior is slowed and withdrawn.         Cognition and Memory: Cognition is impaired.      Comments: Nonverbal at baseline       Results Review:  I have reviewed the labs, radiology results, and diagnostic studies.    Laboratory Data:   Results from last 7 days   Lab Units 07/21/24  0543 07/19/24  0611 07/18/24  0817   WBC 10*3/mm3 6.55 14.23* 13.06*   HEMOGLOBIN g/dL 10.6* 11.1* 12.5   HEMATOCRIT % 32.8* 33.7* 38.5   PLATELETS 10*3/mm3 310 360 255        Results from last 7 days   Lab Units 07/21/24  0543 07/19/24  0611 07/18/24  1244 07/15/24  0600 07/14/24  2231   SODIUM mmol/L 141 139 138   < > 141   POTASSIUM mmol/L 4.2 3.9 4.3   < > 4.5   CHLORIDE mmol/L 106 104 101   < > 104   CO2 mmol/L 27.0 22.0 19.0*   < > 26.0   BUN mg/dL 13 18 18   < > 29*   CREATININE mg/dL 0.38* 0.49* 0.39*   < > 0.46*   CALCIUM mg/dL 9.4 9.8 9.8   < > 10.1   BILIRUBIN mg/dL  --   --   --   --  0.7   ALK PHOS U/L  --   --   --   --  84   ALT (SGPT) U/L  --   --   --   --  14   AST (SGOT) U/L  --   --   --   --  14   GLUCOSE mg/dL  131* 130* 95   < > 141*    < > = values in this interval not displayed.     Urine Culture - Urine, Indwelling Urethral Catheter  Order: [id number] - Reflex for Order [id number]  Status: Final result       Visible to patient: No (scheduled for 7/17/2024 12:06 PM)       Next appt: None    Specimen Information: Indwelling Urethral Catheter; Urine   0 Result Notes  Urine Culture >100,000 CFU/mL Proteus mirabilis Abnormal          Colonization of the urinary tract without infection is common. Treatment is discouraged unless the patient is symptomatic, pregnant, or undergoing an invasive urologic procedure.        Resulting Agency: [facility]     Susceptibility     Proteus mirabilis     SANDEEP     Amoxicillin + Clavulanate <=2 ug/ml Susceptible     Ampicillin <=2 ug/ml Susceptible     Ampicillin + Sulbactam <=2 ug/ml Susceptible     Cefazolin <=4 ug/ml Susceptible     Cefepime <=1 ug/ml Susceptible     Ceftazidime <=1 ug/ml Susceptible     Ceftriaxone <=1 ug/ml Susceptible     Gentamicin <=1 ug/ml Susceptible     Levofloxacin 4 ug/ml Resistant     Nitrofurantoin 128 ug/ml Resistant     Piperacillin + Tazobactam <=4 ug/ml Susceptible     Trimethoprim + Sulfamethoxazole <=20 ug/ml Susceptible               Linear View         Specimen Collected: 07/14/24 22:15 CDT Last Resulted: 07/17/24 11:06 CDT               Radiology Data:   Imaging Results (Last 24 Hours)       ** No results found for the last 24 hours. **            I have reviewed the patient's current medications.     baclofen, 40 mg, Per G Tube, TID  bisacodyl, 10 mg, Rectal, Daily  cefTRIAXone, 2,000 mg, Intravenous, Q24H  cetirizine, 10 mg, Per G Tube, Daily  chlorhexidine, 15 mL, Mouth/Throat, BID  docusate, 150 mg, Per G Tube, BID  enoxaparin, 40 mg, Subcutaneous, Daily  Ferrous Sulfate, 300 mg, Per G Tube, BID  fluticasone, 2 spray, Nasal, QAM  guaifenesin, 600 mg, Per G Tube, BID  insulin regular, 2-7 Units, Subcutaneous, Q6H  metoprolol tartrate, 50 mg, Per G  Tube, BID  nystatin, 1 Application, Topical, BID  pantoprazole, 40 mg, Oral, BID  polyethylene glycol, 17 g, Per G Tube, Daily  potassium chloride, 20 mEq, Per G Tube, BID  rosuvastatin, 5 mg, Per G Tube, Nightly  saccharomyces boulardii, 250 mg, Per G Tube, Daily  Scopolamine, 1 patch, Transdermal, Q72H  sennosides-docusate, 2 tablet, Oral, BID  simethicone, 20 mg, Per G Tube, Q6H  sodium chloride, 10 mL, Intravenous, Q12H  thiamine, 100 mg, Per G Tube, Daily  tiZANidine, 4 mg, Per G Tube, Q8H         Assessment/Plan   Assessment  Active Hospital Problems    Diagnosis     **Sepsis secondary to UTI     High anion gap metabolic acidosis     Constipation     Chronic indwelling Mojica catheter     Tracheostomy malfunction     Presence of externally removable percutaneous endoscopic gastrostomy (PEG) tube     History of anoxic brain injury     Functional quadriplegia     Tracheostomy present        Treatment Plan  1.  Sepsis secondary to UTI, with chronic Mojica catheter-7/14/2024 septic state with fever of 101.7, tachycardia at 121, WBC of 18.8, with obvious source of infection, as urinalysis revealed turbid urine with hematuria and nitrates, leukocytes, and 4+ bacteria. Urine culture positive  for Proteus Mirabilis, continue 2 g ceftriaxone, therapy day 7.      2.  History of anoxic brain injury/functional quadriplegia-after overnight episode of respiratory distress, this a.m. patient is back to baseline.  Patient remains in primarily vegetative state.  Extensive discussion with patient's father this a.m. regarding palliative versus hospice care.  He and his wife will be this afternoon to discuss with palliative care.    3.  Tracheostomy present-currently patient's oxygen saturation is back at baseline on 9 L trach collar.  Per chest x-ray performed this morning during rapid response.  Tracheostomy tube appears retracted compared to prior study, tip no longer projecting over the trachea.  Respiratory therapy attempted  to realign, they were unable to do so.  ENT consult placed.  Will continue to follow     4.  Obstipation-patient CT scan on admission revealed possible ileus or acute nonspecific enteritis.  Follow-up [facility] on 7/16/2024 and 7/17/2024 revealed continued ileus and progressing small bowel obstruction.  CT scan this morning was negative for acute abdominal findings.  Since p.o. meds and tube feeding resumed.  Milk and molasses enema performed this morning.  Continue to follow     5.  Presence of externally removable percutaneous endoscopic gastrostomy tube-after CT scan revealed no acute abdominal findings, tube feeding resumed per dietitian's recommendations.    VTE prophylaxis Lovenox 40 mg.  Labs in a.m.    Medical Decision Making  Number and Complexity of problems: 5  Differential Diagnosis: None    Conditions and Status        Condition is unchanged.     [facility] Data  External documents reviewed:   CODE STATUS-DNR/DNI per previous documentation  Cardiac tracing (EKG, telemetry) interpretation: 7/18/2024 EKG interpretation is tachycardia, ventricular rate 111 atrial rate 111 QTc 435 MS.  Overnight telemetry S/ST 99-1 26.    Radiology interpretation:   7/18/2024 chest x-ray and CT of abdomen pelvis per radiology, reviewed   Labs reviewed:   7/18/2024 ABGs x 2, CBC, BMP, lactic acid reviewed, repeat in a.m.  Any tests that were considered but not ordered: None     Decision rules/scores evaluated (example [id number], Wells, etc): None  Discussed with: Dr. De La Torre, Nneka Barton, BERT, palliative care, and the patient's father Noel    Care Planning  code status and discussions: DNR/DNI per documentation with patient upon admission.  Surrogate Decision Maker Father Noel    Disposition  Social Determinants of Health that impact treatment or disposition: Patient is a resident of [facility] family is requesting discharge to new [facility].  I expect the patient to be discharged to SNF in unknown days.     Electronically  signed by Elbert Valles MD, 07/21/24, 12:42 CDT.

## 2024-07-21 NOTE — PROGRESS NOTES
[facility]     Progress Note    Patient Name: Namita Zabala  : 1975  MRN: [id number]  Primary Care Physician:  David Lara MD  Date of admission: 2024    Subjective   Subjective     Chief Complaint: Sepsis, tracheostomy management    History of Present Illness  Namita Zabala is a 49 y.o. female who is currently admitted to the hospital for sepsis.  Since tracheostomy tube removal by Dr. Francis patient has been on room air and breathing fine.  Upon my examination today there appears to be no change in this, she is sleeping appears comfortable at this time.  There is no family at bedside.     No notable change since examination yesterday.  No changes in respiratory status per nursing staff.        Objective   Objective     Vitals:   Temp:  [97.5 °F (36.4 °C)-98.9 °F (37.2 °C)] 97.5 °F (36.4 °C)  Heart Rate:  [] 57  Resp:  [16-18] 16  BP: ()/(50-77) 95/50    ENT Physical Exam  Constitutional  Constitutional comments: Patient sleeping, she does arouse to physical stimuli, no acute distress.  Patient with contracture present to the extremities and neck.    Head and Face  Appearance: face appears atraumatic;  Nose  External Nose: nares patent bilaterally; external nose normal;  Neck  Neck comments: Patient has fairly prominent contracture to the neck  Tracheostomy site appears relatively normal with no significant redness swelling or drainage around site.  No tracheostomy tube present  Respiratory  Inspection: breathing unlabored; normal breathing rate;  Respiratory comments: Room air  Neurovestibular  Neurological comments: Impaired, patient sleeping during exam, does respond to touch         Result Review    Result Review:  I have personally reviewed the results from the time of this admission to 2024 09:53 CDT and agree with these findings:  []  Laboratory list / accordion  []  Microbiology  [x]  Radiology  []  EKG/Telemetry   []  Cardiology/Vascular   []   Pathology  []  Old records  []  Other:  Most notable findings include:   No new imaging pertinent to ENT      Assessment & Plan   Assessment / Plan     Brief Patient Summary:  Namita Zabala is a 49 y.o. female who has been to the hospital currently for sepsis.  Her tracheostomy tube was removed as was found to be displaced by Dr. Francis 3 days ago this was not replaced per patient's family request.  It appears she is doing well on room air currently.  From ENT standpoint we will continue current management.  We will follow while in the hospital for observation.  Will be available for recheck if any changes occur.    Active Hospital Problems:  Active Hospital Problems    Diagnosis     **Sepsis secondary to UTI     High anion gap metabolic acidosis     Constipation     Chronic indwelling Mojica catheter     Tracheostomy malfunction     Presence of externally removable percutaneous endoscopic gastrostomy (PEG) tube     History of anoxic brain injury     Functional quadriplegia     Tracheostomy present      Plan:   Tracheostomy management-tracheostomy tube is not been replaced at this point.  She appears to be doing well on room air.  ENT will continue to follow monitor as needed.  At this time.  She is doing fine without replacement of the tracheostomy tube.    Sepsis-management per admitting team    VTE Prophylaxis:  Pharmacologic VTE prophylaxis orders are present.        CODE STATUS:    Medical Intervention Limits: No intubation (DNI); No cardioversion  Code Status (Patient has no pulse and is not breathing): No CPR (Do Not Attempt to Resuscitate)  Medical Interventions (Patient has pulse or is breathing): Limited Support    Disposition: Per admitting team    BERT Zelaya      Electronically signed by BERT Zelaya, 07/21/24, 10:04 AM CDT.

## 2024-07-21 NOTE — PLAN OF CARE
Goal Outcome Evaluation: Patient has been resting comfortably this shift. She was given Ativan on previous shift. Patient has been getting her tube feeding as ordered. This nurse spoke with patients parents this afternoon about the discharge plans. Her parents are meeting with the  tomorrow about being her POA. Patient safety to be maintained this shift, continue to monitor and report abnormal to provider.

## 2024-07-22 VITALS
WEIGHT: 131 LBS | HEIGHT: 63 IN | SYSTOLIC BLOOD PRESSURE: 131 MMHG | DIASTOLIC BLOOD PRESSURE: 62 MMHG | BODY MASS INDEX: 23.21 KG/M2 | OXYGEN SATURATION: 97 % | RESPIRATION RATE: 18 BRPM | HEART RATE: 78 BPM | TEMPERATURE: 99.1 F

## 2024-07-22 PROBLEM — N39.0 SEPSIS SECONDARY TO UTI: Status: RESOLVED | Noted: 2024-07-15 | Resolved: 2024-07-22

## 2024-07-22 PROBLEM — J96.01 SEPSIS DUE TO ESCHERICHIA COLI WITH ACUTE HYPOXIC RESPIRATORY FAILURE WITHOUT SEPTIC SHOCK: Status: ACTIVE | Noted: 2024-07-22

## 2024-07-22 PROBLEM — A41.51 SEPSIS DUE TO ESCHERICHIA COLI WITH ACUTE HYPOXIC RESPIRATORY FAILURE WITHOUT SEPTIC SHOCK: Status: ACTIVE | Noted: 2024-07-22

## 2024-07-22 PROBLEM — R65.20 SEPSIS DUE TO ESCHERICHIA COLI WITH ACUTE HYPOXIC RESPIRATORY FAILURE WITHOUT SEPTIC SHOCK: Status: ACTIVE | Noted: 2024-07-22

## 2024-07-22 PROBLEM — A41.9 SEPSIS SECONDARY TO UTI: Status: RESOLVED | Noted: 2024-07-15 | Resolved: 2024-07-22

## 2024-07-22 PROBLEM — Z93.0 TRACHEOSTOMY PRESENT: Status: RESOLVED | Noted: 2023-10-08 | Resolved: 2024-07-22

## 2024-07-22 LAB
GLUCOSE BLDC GLUCOMTR-MCNC: 105 MG/DL (ref 70–130)
GLUCOSE BLDC GLUCOMTR-MCNC: 144 MG/DL (ref 70–130)

## 2024-07-22 PROCEDURE — 25010000002 ENOXAPARIN PER 10 MG: Performed by: FAMILY MEDICINE

## 2024-07-22 PROCEDURE — 99232 SBSQ HOSP IP/OBS MODERATE 35: CPT

## 2024-07-22 PROCEDURE — 82948 REAGENT STRIP/BLOOD GLUCOSE: CPT

## 2024-07-22 RX ORDER — HYDROCODONE BITARTRATE AND ACETAMINOPHEN 7.5; 325 MG/1; MG/1
1 TABLET ORAL EVERY 4 HOURS PRN
Qty: 20 TABLET | Refills: 0 | Status: SHIPPED | OUTPATIENT
Start: 2024-07-22

## 2024-07-22 RX ORDER — CEFDINIR 250 MG/5ML
250 POWDER, FOR SUSPENSION ORAL 2 TIMES DAILY
Qty: 50 ML | Refills: 0 | Status: SHIPPED | OUTPATIENT
Start: 2024-07-22 | End: 2024-07-27

## 2024-07-22 RX ORDER — BISACODYL 10 MG
10 SUPPOSITORY, RECTAL RECTAL DAILY
Start: 2024-07-23

## 2024-07-22 RX ORDER — SCOLOPAMINE TRANSDERMAL SYSTEM 1 MG/1
1 PATCH, EXTENDED RELEASE TRANSDERMAL
Start: 2024-07-24

## 2024-07-22 RX ADMIN — Medication 20 MG: at 06:11

## 2024-07-22 RX ADMIN — BISACODYL 10 MG: 10 SUPPOSITORY RECTAL at 09:13

## 2024-07-22 RX ADMIN — PANTOPRAZOLE SODIUM 40 MG: 40 TABLET, DELAYED RELEASE ORAL at 09:13

## 2024-07-22 RX ADMIN — DOCUSATE SODIUM 150 MG: 50 LIQUID ORAL at 09:15

## 2024-07-22 RX ADMIN — POLYETHYLENE GLYCOL 3350 17 G: 17 POWDER, FOR SOLUTION ORAL at 09:15

## 2024-07-22 RX ADMIN — HYDROCODONE BITARTRATE AND ACETAMINOPHEN 1 TABLET: 7.5; 325 TABLET ORAL at 06:09

## 2024-07-22 RX ADMIN — Medication 250 MG: at 09:12

## 2024-07-22 RX ADMIN — POTASSIUM CHLORIDE 20 MEQ: 20 SOLUTION ORAL at 09:16

## 2024-07-22 RX ADMIN — NYSTATIN 1 APPLICATION: 100000 POWDER TOPICAL at 09:25

## 2024-07-22 RX ADMIN — MINERAL SUPPLEMENT IRON 300 MG / 5 ML STRENGTH LIQUID 100 PER BOX UNFLAVORED 300 MG: at 09:16

## 2024-07-22 RX ADMIN — METOPROLOL TARTRATE 50 MG: 50 TABLET, FILM COATED ORAL at 09:12

## 2024-07-22 RX ADMIN — ENOXAPARIN SODIUM 40 MG: 100 INJECTION SUBCUTANEOUS at 09:12

## 2024-07-22 RX ADMIN — TIZANIDINE 4 MG: 4 TABLET ORAL at 06:10

## 2024-07-22 RX ADMIN — BACLOFEN 40 MG: 10 TABLET ORAL at 09:12

## 2024-07-22 RX ADMIN — GUAIFENESIN 600 MG: 200 SOLUTION ORAL at 09:14

## 2024-07-22 RX ADMIN — DOCUSATE SODIUM 50 MG AND SENNOSIDES 8.6 MG 2 TABLET: 8.6; 5 TABLET, FILM COATED ORAL at 09:13

## 2024-07-22 RX ADMIN — Medication 20 MG: at 12:27

## 2024-07-22 RX ADMIN — CETIRIZINE HYDROCHLORIDE 10 MG: 10 TABLET ORAL at 09:12

## 2024-07-22 RX ADMIN — Medication 10 ML: at 09:16

## 2024-07-22 RX ADMIN — THIAMINE HCL TAB 100 MG 100 MG: 100 TAB at 09:13

## 2024-07-22 RX ADMIN — FLUTICASONE PROPIONATE 2 SPRAY: 50 SPRAY, METERED NASAL at 06:14

## 2024-07-22 RX ADMIN — CHLORHEXIDINE GLUCONATE 0.12% ORAL RINSE 15 ML: 1.2 LIQUID ORAL at 09:16

## 2024-07-22 NOTE — CASE MANAGEMENT/SOCIAL WORK
Continued Stay Note  [facility]     Patient Name: Namita Zabala  MRN: [id number]  Today's Date: 7/22/2024    Admit Date: 7/14/2024    Plan: [facility]   Discharge Plan       Row Name 07/22/24 1157       Plan    Plan [facility]    Patient/Family in Agreement with Plan yes    Final Discharge Disposition Code 03 - skilled nursing facility (SNF)    Final Note Pt has been accepted at [facility] per [facility] so informed Marquita Johnson Mother   [phone]. They at first wanted to look at facility but then decided to go ahead with this plan as they have lots to do today and aware of readiness for d/c.  Pt will need to travel via [employer] ambulance [phone]. Facility pharmacy correct.  [facility]   [phone]  Fax: [phone] or [phone] (efax)  Hermila's Cell [phone]        Row Name 07/22/24 0988       Plan    Plan Waiting on decision from [facility]    Patient/Family in Agreement with Plan yes    Plan Comments Spoke with Connie from [facility] [phone] and they have no beds to offer at present time. Spoke with Hermila from [facility] [phone] and she will review weekend notes and inform if they can offer a bed today.  Will follow.                   Discharge Codes    No documentation.                       Ana M Stewart, BSW

## 2024-07-22 NOTE — PROGRESS NOTES
Nutrition Services    Patient Name:  Namita Zabala  YOB: 1975  MRN: [id number]  Admit Date:  7/14/2024    Pt is being d/c'd today to [facility]. Ambulance here to  pt. SNF reports that they do not have the Peptamen 1.5 TF formula at this time. Requesting this facility provide a couple days worth of feeding. Sent x 3 extra bags of TF formula with pt to the [facility].     Electronically signed by:  Kayleen Ballesteros RDN, LINDA  07/22/24 15:01 CDT

## 2024-07-22 NOTE — PROGRESS NOTES
"    [facility] Palliative Care Services  Progress Note  Patient Name: Namita Zabala  Date of Admission: 7/14/2024  Today's Date: 07/22/24     Code Status and Medical Interventions:   Ordered at: 07/15/24 0243     Medical Intervention Limits:    No intubation (DNI)    No cardioversion     Code Status (Patient has no pulse and is not breathing):    No CPR (Do Not Attempt to Resuscitate)     Medical Interventions (Patient has pulse or is breathing):    Limited Support     Subjective   Chief complaint/Reason for Referral/Visit: Follow up on Goals of Care/Advance Care Planning.    Medical record reviewed. Events noted.  No labs collected this morning with exception of blood glucose.  She is lying in bed, asleep and in no apparent distress.   Tracheostomy tube remains out.   Arouses to touch however remains unable to follow commands.  No visitors present.  Discussed with nursing.   SW assisting with discharge plans.     Advance Care Planning     Advanced Directives: No advance directive on file.     Advance Care Discussion: Ms. Zabala remains unable to demonstrate ability to make complex medical decisions.  Call placed to patient's father, Noel, to follow up on previous discussions.   Shared they are in the process of preparing to meet with  this afternoon to determine next steps to obtain guardianship of Ms. Zabala.  Reports they have had further discussions over the weekend.   Shared he is pleased to know she has done well without having a tracheostomy tube and wants to monitor further before making \"major decisions\" regarding hospice or comfort measures.   Noel shared he is aware prognosis remains poor long-term however feels they need to give her a little bit of time before making decision.  He appears to have good prognostic awareness however again is having difficulty making decision at this time.  He was appreciative of update.  Denied any further questions and/or concerns at this " time.  Encouraged if arise.  Support provided.     The patient receives support from her parents. Patient's parents are her next of kin.  Goals of care: Ongoing.    Review of Systems   Unable to perform ROS: Patient nonverbal     Pain Assessment  CPOT and PAINAD Scales: PAINAD (Pain Assessment in Advance Dementia Scale)  PAINAD Breathin-->normal  PAINAD Negative Vocalization: 0-->none  PAINAD Facial Expression: 0-->smiling or inexpressive  PAINAD Body Language: 0-->relaxed  PAINAD Consolability: 0-->no need to console  PAINAD Score: 0  Objective   Diagnostics: Reviewed      Intake/Output Summary (Last 24 hours) at 2024 1129  Last data filed at 2024 1040  Gross per 24 hour   Intake 1496.69 ml   Output 3150 ml   Net -1653.31 ml     Current Facility-Administered Medications   Medication Dose Route Frequency Provider Last Rate Last Admin    acetaminophen (TYLENOL) tablet 650 mg  650 mg Per G Tube Q4H PRN David Hernández MD   650 mg at 24 1602    Or    acetaminophen (TYLENOL) 160 MG/5ML oral solution 650 mg  650 mg Per G Tube Q4H PRN David Hernández MD        Or    acetaminophen (TYLENOL) suppository 650 mg  650 mg Rectal Q4H PRN David Hernández MD   650 mg at 24 1630    artificial tears ophthalmic ointment   Both Eyes Q1H PRN Montserrat Feliciano APRN        baclofen (LIORESAL) tablet 40 mg  40 mg Per G Tube TID Montserrat Feliciano APRN   40 mg at 24 0912    bisacodyl (DULCOLAX) suppository 10 mg  10 mg Rectal Daily Montserrat Feliciano APRN   10 mg at 24 0913    cefTRIAXone (ROCEPHIN) 2,000 mg in sodium chloride 0.9 % 100 mL MBP  2,000 mg Intravenous Q24H Montserrat Feliciano APRN        cetirizine (zyrTEC) tablet 10 mg  10 mg Per G Tube Daily Montserrat Feliciano APRN   10 mg at 24 0912    chlorhexidine (PERIDEX) 0.12 % solution 15 mL  15 mL Mouth/Throat BID Montserrat APRN   15 mL at 24 0916    dextrose (D50W) (25 g/50 mL) IV injection 25 g  25 g  Intravenous Q15 Min PRN Wayne Pimentel DO   25 g at 07/18/24 0301    dextrose (GLUTOSE) oral gel 15 g  15 g Oral Q15 Min PRN Wayne Pimentel DO        docusate (COLACE) 50 MG/5ML liquid 150 mg  150 mg Per G Tube BID Montserrat Feliciano APRN   150 mg at 07/22/24 0915    Enoxaparin Sodium (LOVENOX) syringe 40 mg  40 mg Subcutaneous Daily David Hernández MD   40 mg at 07/22/24 0912    Ferrous Sulfate 300 (60 Fe) MG/5ML solution 300 mg  300 mg Per G Tube BID Montserrat Feliciano APRN   300 mg at 07/22/24 0916    fleet enema 1 enema  1 enema Rectal Q48H PRN David Hernández MD        fluticasone (FLONASE) 50 MCG/ACT nasal spray 2 spray  2 spray Nasal QAM Montserrat Feliciano APRN   2 spray at 07/22/24 0614    glucagon (GLUCAGEN) injection 1 mg  1 mg Intramuscular Q15 Min PRN Wayne Pimentel DO        guaifenesin (ROBITUSSIN) 100 MG/5ML liquid 600 mg  600 mg Per G Tube BID Montserrat Feliciano APRN   600 mg at 07/22/24 0914    HYDROcodone-acetaminophen (NORCO) 7.5-325 MG per tablet 1 tablet  1 tablet Oral Q4H PRN Tae Charles MD   1 tablet at 07/22/24 0609    insulin regular (humuLIN R,novoLIN R) injection 2-7 Units  2-7 Units Subcutaneous Q6H Wayne Pimentel DO        ipratropium-albuterol (DUO-NEB) nebulizer solution 3 mL  3 mL Nebulization Q4H PRN David Hernández MD        lactated ringers infusion  75 mL/hr Intravenous Continuous Darrell De La Torre DO 75 mL/hr at 07/21/24 2313 75 mL/hr at 07/21/24 2313    LORazepam (ATIVAN) injection 1 mg  1 mg Intravenous Q6H PRN Wayne Pimentel DO   1 mg at 07/21/24 0425    metoprolol tartrate (LOPRESSOR) tablet 50 mg  50 mg Per G Tube BID Montserrat Feliciano APRN   50 mg at 07/22/24 0912    Morphine sulfate (PF) injection 1 mg  1 mg Intravenous Q4H PRN Wayne Pimentel DO   1 mg at 07/21/24 0350    nystatin (MYCOSTATIN) powder 1 Application  1 Application Topical BID Montserrat Feliciano APRN   1 Application at 07/22/24 0925    ondansetron  (ZOFRAN) 4 MG/5ML oral solution 4 mg  4 mg Oral Q6H PRN Montserrat Feliciano APRN        ondansetron (ZOFRAN) injection 4 mg  4 mg Intravenous Q6H PRN David Hernández MD   4 mg at 07/18/24 2051    pantoprazole (PROTONIX) EC tablet 40 mg  40 mg Oral BID Montserrat Feliciano APRN   40 mg at 07/22/24 0913    polyethylene glycol (MIRALAX) packet 17 g  17 g Per G Tube Daily Montserrat Feliciano APRN   17 g at 07/22/24 0915    potassium chloride (KAYCIEL) 20 mEq/15 mL solution 20 mEq  20 mEq Per G Tube BID Montserrat Feliciano APRN   20 mEq at 07/22/24 0916    rosuvastatin (CRESTOR) tablet 5 mg  5 mg Per G Tube Nightly Montserrat Feliciano APRN   5 mg at 07/21/24 2209    saccharomyces boulardii (FLORASTOR) capsule 250 mg  250 mg Per G Tube Daily Montserrat Feliciano APRN   250 mg at 07/22/24 0912    scopolamine patch 1 mg/72 hr  1 patch Transdermal Q72H Montserrat Feliciano APRN   1 patch at 07/21/24 1505    sennosides-docusate (PERICOLACE) 8.6-50 MG per tablet 2 tablet  2 tablet Oral BID Montserrat Feliciano APRN   2 tablet at 07/22/24 0913    simethicone (MYLICON) 40 MG/0.6ML drops 20 mg  20 mg Per G Tube Q6H Montserrat Feliciano APRN   20 mg at 07/22/24 0611    sodium chloride 0.9 % flush 10 mL  10 mL Intravenous Q12H David Hernández MD   10 mL at 07/22/24 0916    sodium chloride 0.9 % flush 10 mL  10 mL Intravenous PRN David Hernández MD        sodium chloride 0.9 % infusion 40 mL  40 mL Intravenous PRN David Hernández MD        sodium chloride nasal spray 2 spray  2 spray Each Nare PRN Montserrat Feliciano APRN        thiamine (VITAMIN B-1) tablet 100 mg  100 mg Per G Tube Daily Montserrat Feliciano, APRN   100 mg at 07/22/24 0913    tiZANidine (ZANAFLEX) tablet 4 mg  4 mg Per G Tube Q8H Montserrat wolfe, APRN   4 mg at 07/22/24 0610    zinc oxide 20 % ointment   Topical Q4H PRN Jodie, Montserrat, APRN         lactated ringers, 75 mL/hr, Last Rate: 75 mL/hr (07/21/24 0773)        acetaminophen **OR** acetaminophen  "**OR** acetaminophen    artificial tears    dextrose    dextrose    fleet enema    glucagon (human recombinant)    HYDROcodone-acetaminophen    ipratropium-albuterol    LORazepam    Morphine    ondansetron    ondansetron    sodium chloride    sodium chloride    sodium chloride    zinc oxide  Current medications patient is presently taking including all prescriptions, over-the-counter, herbals and vitamin/mineral/dietary (nutritional) supplements with reviewed including route, type, dose and frequency and are current per MAR at time of dictation.    Assessment:  Vital Signs: /74 (BP Location: Right leg, Patient Position: Lying)   Pulse 81   Temp 98.4 °F (36.9 °C) (Axillary)   Resp 18   Ht 160 cm (63\")   Wt 59.4 kg (131 lb)   LMP  (LMP Unknown)   SpO2 97%   BMI 23.21 kg/m²     Physical Exam  Vitals and nursing note reviewed.   Constitutional:       General: She is sleeping. She is not in acute distress.     Appearance: She is ill-appearing.   HENT:      Head: Normocephalic.   Eyes:      General: Lids are normal.   Neck:      Comments: Dressing over tracheostomy site. CDI.   Cardiovascular:      Rate and Rhythm: Normal rate.   Pulmonary:      Effort: Pulmonary effort is normal.   Abdominal:      Palpations: Abdomen is soft.      Comments: PEG tube present.    Genitourinary:     Comments: Indwelling urinary catheter present.  Musculoskeletal:      Comments: Contractures of all extremities.    Skin:     General: Skin is warm and dry.   Neurological:      Mental Status: She is easily aroused.   Psychiatric:         Speech: She is noncommunicative.     Functional status: Palliative Performance Scale Score: Performance 10% based on the following measures: Ambulation: Totally bed bound, Activity and Evidence of Disease: Unable to do any work, extensive evidence of disease, Self-Care: Total care required,  Intake: Mouth care only, LOC: Drowsy or comatose.  Nutritional status: Albumin 3.7. Body mass index is " "23.21 kg/m².   Patient status: Disease state: Controlled with current treatments.    Active Hospital Problems    Diagnosis     **Sepsis secondary to UTI     High anion gap metabolic acidosis     Constipation     Chronic indwelling Stratton catheter     Tracheostomy malfunction     Presence of externally removable percutaneous endoscopic gastrostomy (PEG) tube     History of anoxic brain injury     Functional quadriplegia     Tracheostomy present        Impression/Problem List:  Sepsis secondary to urinary tract infection  History of anoxic brain injury  Ileus  Functional quadriplegia / Contractures / Bedbound / Impaired mobility and ADLs  Tracheostomy present - Removed on 7/18/2024 by ENT   Kidney stones  Neurogenic bladder with chronic indwelling stratton catheter   Presence of PEG tube    Plan / Recommendations     Palliative Care Encounter   Goals of care include CODE STATUS NO CPR with limited support interventions.    Prognosis is poor long-term secondary to sepsis due to UTI, anoxic brain injury, functional quadriplegia, presence of tracheostomy and PEG tube, ileus and other comorbidities listed above.      Ms. Zabala's parents, Noel and Marquita, are her next of kin as her spouse recently passed away and she has no children.       Ms. Zabala remains unable to demonstrate ability to make complex medical decisions.       Multiple discussions have been held regarding goals of care and treatment options throughout hospitalization.   Please see previous notes for additional details.     Followed up with her father, Noel, this morning.  Reports he is preparing to meet with  this afternoon to determine next steps to obtain guardianship of Ms. Zabala.     Shared he and his spouse are pleased to know she has done well without having a tracheostomy tube and wants to monitor further before making \"major decisions\" regarding hospice or comfort measures.        Noel shared he is aware prognosis remains poor " long-term however feels they need to give her a little bit of time before making decision.       He appears to have good prognostic awareness however again is having difficulty making decision at this time.  Denied any further questions and/or concerns at this time.  Support provided.      Thank you for allowing us to participate in patient's plan of care. Palliative Care Team will continue to follow patient.    Time spent:45 minutes spent reviewing medical and medication records, assessing and examining patient, discussing with family, answering questions, providing some guidance about a plan and documentation of care, and coordinating care with other healthcare members, with > 50% time spent face to face.     Electronically signed by, BERT White, 07/22/24.

## 2024-07-22 NOTE — PLAN OF CARE
Goal Outcome Evaluation:         Patient A/O x0-1 (possibly self when addressed sometimes will open eyes). Patient treated for pain, scored via PAINAD scale per flowsheet/MAR. Patient turned per documentation. Patient rec'd IVF and abx per MAR. Will update oncoming dayshift nurse at bedside shift report in the a.m. as appropriate.

## 2024-07-22 NOTE — DISCHARGE SUMMARY
[facility] Medicine Services  DISCHARGE SUMMARY       Date of Admission: 7/14/2024  Date of Discharge:  7/22/2024  Primary Care Physician: David Lara MD    Presenting Problem/History of Present Illness:  Trach malfunction/PEG tube dislodgment    Final Discharge Diagnoses:  Active Hospital Problems    Diagnosis     **Sepsis secondary to UTI     Sepsis due to Escherichia coli with acute hypoxic respiratory failure without septic shock     High anion gap metabolic acidosis     Constipation     Chronic indwelling Mojica catheter     Tracheostomy malfunction     Presence of externally removable percutaneous endoscopic gastrostomy (PEG) tube     History of anoxic brain injury     Functional quadriplegia     Tracheostomy present        Consults: Palliative care, urology, and ENT None    Procedures Performed: None    Pertinent Test Results:   Results for orders placed during the hospital encounter of 08/27/21    Adult Transthoracic Echo Complete W/ Cont if Necessary Per Protocol    Interpretation Summary  · Estimated left ventricular EF = 60% Left ventricular systolic function is normal.  · Left ventricular diastolic function is consistent with (grade I) impaired relaxation.  · The right ventricular cavity is mildly dilated. Systolic function is normal.  · The right atrial cavity is mildly dilated.  · There is mild aortic and tricuspid valve regurgitation present.  · Estimated right ventricular systolic pressure from tricuspid regurgitation is normal (<35 mmHg).      Imaging Results (All)       Procedure Component Value Units Date/Time    CT Abdomen Pelvis Without Contrast [[id number]] Collected: 07/18/24 1119     Updated: 07/18/24 1140    Narrative:      EXAM: CT ABDOMEN PELVIS WO CONTRAST-      DATE: 7/18/2024 8:59 AM     HISTORY: SBO; N39.0-Urinary tract infection, site not specified;  A41.9-Sepsis, unspecified organism       COMPARISON: 7/15/2024.     DOSE LENGTH PRODUCT:  712.25 mGy.cm Automatic exposure control was  utilized to make radiation dose as low as reasonably achievable.     TECHNIQUE: Unenhanced axial images of the abdomen and pelvis obtained  with coronal and sagittal reformats.     FINDINGS: Evaluation limited secondary to lack of intravenous contrast  agent.     VISUALIZED CHEST: Similar patchy groundglass opacities at the lower  lungs, not optimally evaluated on this motion limited exam compared to  7/15/2024. No pleural or pericardial effusion.     LIVER: Normal hepatic contour.     BILIARY: No calcified gallstone. No intrahepatic or extrahepatic bile  duct dilation.      PANCREAS: Atrophic, otherwise within normal limits.     SPLEEN: Normal size and contour.      ADRENAL: Normal appearance of the bilateral adrenal glands.     GENITOURINARY:  There appear to be large bilateral renal calculi with a staghorn  configuration on the RIGHT. Motion partially limits evaluation, but  there appears to be thickening and prominent caliber of the RIGHT  greater than LEFT upper renal collecting systems.     Mojica catheter in a mildly thickened urinary bladder. Nondependent gas  in the urinary bladder, may be due to catheterization. Urinary bladder  wall appears mildly thickened, which may be due to under distention or  cystitis.     Probable atrophic uterus. Adnexa not discretely identified..     PERITONEUM: No free air or ascites.     GI TRACT: PEG tube with balloon appropriately positioned in the distal  stomach. Normal appearance of the stomach and duodenum. No abnormally  dilated loops of bowel or bowel wall thickening. Large amount of colonic  stool. There is contrast throughout the colon. Normal appendix on axial  series 3, images 61-55.     VESSELS: Aorta is normal in course and caliber.     RETROPERITONEUM: No mass, lymphadenopathy or hemorrhage.     SOFT TISSUES: Broad-based LEFT groin hernia containing nondilated loops  of colon.     BONES: Sclerotic focus in the LEFT  proximal femur, favored to represent  a bone island. Similar dysmorphic configuration of the pelvis and  thighs.          Impression:      1. No acute intra-abdominal finding. Broad-based LEFT groin hernia  containing nondilated loops of colon. There is enteric contrast in the  colon nearly to the rectum. Ball of stool at the rectum now measures 6  cm, previously 8.5 cm on 7/15/2024.     2. Thickened and prominent caliber of the RIGHT greater than LEFT upper  renal collecting system with a staghorn type calculi. Thickening could  be due to chronic inflammation, infection, or malignancy. Consider  direct visualization when clinically appropriate.     3. Urinary bladder appears mildly thickened, which could be due to under  distention or cystitis. Consider correlation with urinalysis.     4. Similar patchy groundglass opacities lower lungs, not optimally  evaluated on this motion limited exam.     5. Appropriate position of PEG tube.     This report was signed and finalized on 7/18/2024 11:37 AM by Dr Dea Barksdale MD.       XR Chest 1 View [[id number]] Collected: 07/18/24 0657     Updated: 07/18/24 0707    Narrative:      EXAM: XR CHEST 1 VW-      DATE: 7/18/2024 5:30 AM     HISTORY: trach; N39.0-Urinary tract infection, site not specified;  A41.9-Sepsis, unspecified organism       COMPARISON: 7/14/2024.     TECHNIQUE:  Single view. Frontal view of the chest. 1 images.       FINDINGS:    Limited evaluation due to patient positioning. Tracheostomy tube appears  retracted compared to prior.      No pneumothorax or pleural effusion. Patchy bibasilar opacities. Limited  evaluation of the RIGHT lung apex due to overlying structures.     Cardiac and mediastinal silhouette appear within normal limits. No acute  bony finding.     PEG tube at the partially visualized upper abdomen.          Impression:      1. Tracheostomy tube appears retracted compared to prior. Tip no longer  projecting over the trachea.  2. Streaky  bibasilar opacities.     Result communicated by telephone to the patient's nurse Namita Velez at 7:03 AM on 7/19/2024.     This report was signed and finalized on 7/18/2024 7:04 AM by Dr Dea Barksdale MD.       XR Abdomen KUB [[id number]] Collected: 07/17/24 0650     Updated: 07/17/24 0656    Narrative:      EXAMINATION: XR ABDOMEN KUB-     7/17/2024 2:48 AM     HISTORY: reevaluation of ilieus; N39.0-Urinary tract infection, site not  specified; A41.9-Sepsis, unspecified organism     A frontal projection of the abdomen is compared with the previous study  dated 7/15/2024.     The moderately more progressive dilated loops of small and large bowel  throughout the abdomen.     There is moderate amount of stool in the colon more so in the rectum.     Moderately gas distended stomach is seen.     No acute bony abnormality.       Impression:      1. Moderate progressive ileus. The possibility of small bowel  obstruction may not be excluded. Further follow-up may be obtained.  2. Gastrostomy tube in place.        This report was signed and finalized on 7/17/2024 6:53 AM by Dr. Lui Bush MD.       XR Abdomen KUB [[id number]] Collected: 07/15/24 1057     Updated: 07/15/24 1102    Narrative:      XR ABDOMEN KUB-     HISTORY: Peg Tube placement; N39.0-Urinary tract infection, site not  specified; A41.9-Sepsis, unspecified organism     COMPARISON: 6/4/2024     FINDINGS: Frontal view of the abdomen is obtained.     Gastrostomy tube projects over the region of the stomach. No contrast  injected with the study. Gaseous distended small and large bowel loops  favoring adynamic ileus. No pneumatosis.       Impression:      1. Gastrostomy tube projects over the region of the stomach, no contrast  injected with the study.  2. Gaseous distended small and large bowel favoring adynamic ileus.     This report was signed and finalized on 7/15/2024 10:59 AM by Dr. Rina Ricardo MD.       CT Abdomen Pelvis With Contrast  [[id number]] Collected: 07/15/24 0546     Updated: 07/15/24 0753    Narrative:      EXAMINATION: CT ABDOMEN PELVIS W CONTRAST-      7/14/2024 11:13 PM     HISTORY: fever; N39.0-Urinary tract infection, site not specified;  A41.9-Sepsis, unspecified organism     In order to have a CT radiation dose as low as reasonably achievable  Automated Exposure Control was utilized for adjustment of the mA and/or  KV according to patient size.     Total DLP = 481.19 mGy.cm     The CT scan of the abdomen and pelvis is performed after intravenous  contrast enhancement.     Images are acquired in axial plane and subsequent reconstruction in  coronal and sagittal planes.     The comparison is made with the previous study dated 5/3/2024.     The limited included lung bases show coarse/nodular interstitial  infiltrate in the lower lungs.     The liver and spleen are normal.     No radiopaque gallstones.     Pancreas is normal.     The adrenal glands bilaterally are normal.     Moderate lobulation of the kidneys bilaterally. There are bilateral  large/staghorn calculi. Large part of the right staghorn calculus  extends into the right renal pelvis. The previously seen left ureteral  stent and nephrostomy tube have been removed in the interval. There is  significant thickening and enhancement of the urothelium of the renal  pelvis and ureters bilaterally. No significant dilatation of the  ureters. No calculi in either ureter. The urinary bladder is  decompressed with a Mojica catheter and not optimally visualized or  evaluated.     There is a gastrostomy tube in place. The stomach is moderately  distended with fluid and gas. A mildly dilated fluid-filled small bowel  is seen. No finding to suggest obstruction. Fluid in gas-filled large  bowel is seen with a significant large volume of stool in the distal  colon, particularly the rectum. This is similar to the previous study.     Normal abdominal aorta and iliac arteries.     Images  reviewed in bone window show moderate levoscoliosis of the lumbar  spine. No acute bony abnormality.       Impression:      1. Moderate thickening and enhancement of the mucosa/urothelium of the  collecting system bilaterally may represent acute or subacute  inflammatory process/pyeloureteritis. No evidence of acute  pyelonephritis.  2. Bilateral staghorn calculi. No finding to suggest obstruction.  3. A significant large volume stool in the distal colon with moderate  gas distended proximal colon. The possibility of fecal impaction not  excluded. This is similar to the previous study.  4. A mildly dilated fluid-filled small bowel loops may represent ileus  or acute nonspecific enteritis. No evidence of small bowel obstruction.  5. A gastrostomy tube in place. Moderate gas and fluid-filled distended  stomach.     The above study was initially reviewed and reported by [employer]. I do not  find any discrepancies.                                      This report was signed and finalized on 7/15/2024 7:50 AM by Dr. Lui Bush MD.       XR Chest 1 View [[id number]] Collected: 07/15/24 0608     Updated: 07/15/24 0614    Narrative:      EXAMINATION: XR CHEST 1 VW-     7/14/2024 9:22 PM     HISTORY: fever     A frontal projection of the chest is compared with the previous study  dated 7/3/2024.     The lungs are poorly expanded, right worse than the left.     The medial half of the diaphragm is obliterated by the adjacent  consolidation/infiltrate.     There are atelectatic changes in the right lung which have moderately  improved since the previous study.     There is no pleural effusion, pulmonary congestion or pneumothorax.     There is possible moderate cardiomegaly.     There is no acute bony abnormality.     Tracheostomy tube is in place.       Impression:      1. Possible left lower lobar consolidation/infiltrate which may  represent an inflammatory/infectious process.           This report was signed and  finalized on 7/15/2024 6:11 AM by Dr. Lui Bush MD.             LAB RESULTS:      Lab 07/21/24  0543 07/19/24  0611 07/18/24  1054 07/18/24  0817 07/17/24  0411 07/16/24  0654   WBC 6.55 14.23*  --  13.06* 10.74 13.60*   HEMOGLOBIN 10.6* 11.1*  --  12.5 10.7* 10.1*   HEMATOCRIT 32.8* 33.7*  --  38.5 34.0 32.4*   PLATELETS 310 360  --  255 287 245   NEUTROS ABS 4.37 11.39*  --  11.88* 8.41* 11.34*   IMMATURE GRANS (ABS) 0.07* 0.08*  --   --  0.08* 0.07*   LYMPHS ABS 1.46 1.94  --   --  1.32 1.19   MONOS ABS 0.52 0.73  --   --  0.51 0.48   EOS ABS 0.11 0.05  --  0.13 0.36 0.47*   MCV 91.6 87.8  --  90.4 91.4 92.3   LACTATE  --   --  1.3  --   --   --          Lab 07/21/24  0543 07/19/24  0611 07/18/24  1244 07/18/24  0817 07/17/24  0411   SODIUM 141 139 138 137 140   POTASSIUM 4.2 3.9 4.3 4.1 4.1   CHLORIDE 106 104 101 102 107   CO2 27.0 22.0 19.0* 17.0* 21.0*   ANION GAP 8.0 13.0 18.0* 18.0* 12.0   BUN 13 18 18 18 13   CREATININE 0.38* 0.49* 0.39* 0.40* 0.32*   EGFR 124.6 117.2 123.8 123.0 129.8   GLUCOSE 131* 130* 95 86 106*   CALCIUM 9.4 9.8 9.8 9.8 9.0   MAGNESIUM 1.9  --   --   --   --                          Lab 07/18/24  1050 07/18/24  0519   PH, ARTERIAL 7.408 7.284*   PCO2, ARTERIAL 30.2* 41.4   PO2 ART 88.9 52.5*   O2 SATURATION ART 97.4 82.3*   FIO2 40 100   HCO3 ART 19.1* 19.6*   BASE EXCESS ART -4.6* -6.8*     Brief Urine Lab Results  (Last result in the past 365 days)        Color   Clarity   Blood   Leuk Est   Nitrite   Protein   CREAT   Urine HCG        07/14/24 2215 Dark Yellow   Turbid   Moderate (2+)   Large (3+)   Positive   >=300 mg/dL (3+)                 Microbiology Results (last 10 days)       Procedure Component Value - Date/Time    Blood Culture With AASHISH - Blood, Hand, Left [[id number]]  (Normal) Collected: 07/18/24 1244    Lab Status: Preliminary result Specimen: Blood from Hand, Left Updated: 07/21/24 1401     Blood Culture No growth at 3 days    Blood Culture With AASHISH - Blood,  Arm, Left [[id number]]  (Normal) Collected: 07/18/24 1054    Lab Status: Preliminary result Specimen: Blood from Arm, Left Updated: 07/22/24 1130     Blood Culture No growth at 4 days    CANDIDA AURIS SCREEN - Swab, Axilla Right, Axilla Left and Groin [[id number]]  (Normal) Collected: 07/15/24 2124    Lab Status: Final result Specimen: Swab from Axilla Right, Axilla Left and Groin Updated: 07/20/24 2145     Candida Auris Screen Culture No Candida auris isolated at 5 days    Blood Culture - Blood, Hand, Left [[id number]]  (Normal) Collected: 07/14/24 2303    Lab Status: Final result Specimen: Blood from Hand, Left Updated: 07/19/24 2316     Blood Culture No growth at 5 days    Blood Culture - Blood, Hand, Right [[id number]]  (Abnormal) Collected: 07/14/24 2302    Lab Status: Final result Specimen: Blood from Hand, Right Updated: 07/17/24 0609     Blood Culture Staphylococcus, coagulase negative     Isolated from Anaerobic Bottle     Gram Stain Anaerobic Bottle Gram positive cocci    Narrative:      Probable contaminant requires clinical correlation, susceptibility not performed unless requested by physician.      Blood Culture ID, PCR - Blood, Hand, Right [[id number]]  (Abnormal) Collected: 07/14/24 2302    Lab Status: Final result Specimen: Blood from Hand, Right Updated: 07/15/24 2028     BCID, PCR Staph spp, not aureus or lugdunensis. Identification by BCID2 PCR.     BOTTLE TYPE Anaerobic Bottle    Urine Culture - Urine, Indwelling Urethral Catheter [[id number]]  (Abnormal)  (Susceptibility) Collected: 07/14/24 2215    Lab Status: Final result Specimen: Urine from Indwelling Urethral Catheter Updated: 07/17/24 1106     Urine Culture >100,000 CFU/mL Proteus mirabilis    Narrative:      Colonization of the urinary tract without infection is common. Treatment is discouraged unless the patient is symptomatic, pregnant, or undergoing an invasive urologic procedure.    Susceptibility        Proteus mirabilis      SANDEEP       Amoxicillin + Clavulanate Susceptible      Ampicillin Susceptible      Ampicillin + Sulbactam Susceptible      Cefazolin Susceptible      Cefepime Susceptible      Ceftazidime Susceptible      Ceftriaxone Susceptible      Gentamicin Susceptible      Levofloxacin Resistant      Nitrofurantoin Resistant      Piperacillin + Tazobactam Susceptible      Trimethoprim + Sulfamethoxazole Susceptible                           COVID PRE-OP / PRE-PROCEDURE SCREENING ORDER (NO ISOLATION) - Swab, Nasal Cavity [[id number]]  (Normal) Collected: 07/14/24 2213    Lab Status: Final result Specimen: Swab from Nasal Cavity Updated: 07/14/24 2320    Narrative:      The following orders were created for panel order COVID PRE-OP / PRE-PROCEDURE SCREENING ORDER (NO ISOLATION) - Swab, Nasal Cavity.  Procedure                               Abnormality         Status                     ---------                               -----------         ------                     COVID-19 and FLU A/B PCR...[[id number]]  Normal              Final result                 Please view results for these tests on the individual orders.    COVID-19 and FLU A/B PCR, 1 HR TAT - Swab, Nasopharynx [[id number]]  (Normal) Collected: 07/14/24 2213    Lab Status: Final result Specimen: Swab from Nasopharynx Updated: 07/14/24 2320     COVID19 Not Detected     Influenza A PCR Not Detected     Influenza B PCR Not Detected    Narrative:      Fact sheet for providers: https://www.fda.gov/media/385728/download    Fact sheet for patients: https://www.fda.gov/media/870094/download    Test performed by PCR.            Hospital Course:   Namita Zabala is a 49-year-old female with a history of anoxic brain damage, tracheostomy, PEG tube, chronic respiratory failure, pulmonary embolism, recurrent UTI, and staghorn renal calculus.  Patient was transferred per EMS from [facility] with complaints of a clogged PEG tube and displaced tracheostomy.  Upon  assessment at [facility] emergency department the patient presented in a septic state with fever of 101.7, tachycardia at 121, WBC of 18.8, with obvious source of infection, as urinalysis revealed turbid urine with hematuria and nitrates, leukocytes, and 4+ bacteria.  Chronic Mojica catheter managed per outpatient urology at [facility], most recent appointment was 6/27/2024 where she was evaluated post nephrostomy tube being removed, where their recommendations were to observe rather than replace tube at that time.  ED workup revealed tracheostomy was not dislodged however it was extremely clogged with debris and dried secretions, when that was cleaned and replaced patient was able to utilize properly.  Additionally PEG tube was not dislodged, after cleaning debris and evaluating PEG tube it was in proper alignment.  CT scan revealed thickening in handedness on the mucosal urothelium of the collecting system bilaterally may represent acute or subacute inflammatory process/pyeloureteritis, no evidence of acute.  Stable nonobstructive bilateral staghorn calculi, and significant large volume stool in the distal colon with moderate gas distention of proximal colon.  Mildly dilated fluid filled small bowel loops may represent ileus or acute nonspecific enteritis.  No evidence of small bowel obstruction.  ED medications 1 L normal saline bolus, 650 mg Tylenol suppository, and 1 g of Rocephin.  Previous urine culture 5/24 was positive for ESBL and Proteus Mirabilis.  Susceptible to ceftriaxone, 2 g continued.     Rapid response 7/18/2024 patient developed respiratory distress after given approximately 10 cc of oral contrast through her PEG tube.  Dr. Dan who responded to the rapid response, she was not sure if it was just a fluid overload issue versus a true contrast allergy as patient has tolerated contrast in the past.  Per Dr. Dan patient appeared to be struggling to catch her breath with current oxygenation at  9 L on trach collar and oxygen saturation was approximately 79 to 80%.  Patient was immediately given patient was given 25 mg IV Benadryl, 20 mg of IV Pepcid, 125 mg of Solu-Medrol and 0.5 mL of racemic epinephrine per trach.  She stated that the patient's oxygen saturation immediately responded with relaxation of her respiratory distress, back to baseline and oxygen saturation was 94%.  Initial ABGs during rapid response pH 7.2, pCO2 41.4, pO2 52.5, HCO3 of 19.6.  Patient responded well and was back to her baseline 9 L on her trach collar.  ABGs were repeated at 1050 this a.m. and patient again is compensated.  pH 7.4, pCO2 30.2, pO2 88.9, HCO3 of 19.1.     Sepsis due to Proteus Mirabilis per chronic Mojica catheter.  On 7/14/2024 patient presented from [facility] with a fever of 101.7, tachycardic at 121, WBC of 18.8 with obvious source of infection as urinalysis revealed turbid urine with hematuria, nitrates, leukocytes and 4+ bacteria.  Subsequent urine culture was positive for Proteus Mirabella's.  Per urology's recommendations patient will require another 14 days total of antibiotics.  Patient received 9 days of Rocephin, discharged to SNF with 5 days of p.o. Omnicef.    History of anoxic brain injury/functional quadriplegia-patient remained at her baseline, vegetative state throughout hospitalization.  Patient continues to be in able to participate in PT/OT given her care fairly contracted body habitus extensive discussions with family regarding patient's palliative needs and probability for upcoming hospice.  At the time of discharge patient's family wanted to continue with DNR/DNI and not to discontinue any treatment at this time.    Presence of externally removable percutaneous endoscopy gastrostomy tube-patient was initially sent over from [facility] due to probable clogged PEG tube.  Upon admission patient's PEG tube was cleaned and a large amount of debris was removed.  During hospitalization    Tracheostomy  malfunction-patient was additionally sent over from [facility] with probable tracheostomy malfunctioning.  Upon admission to the ED tracheostomy was placed back and after extensive cleaning of debris and dried secretions.  During rapid response patient's tracheostomy appeared to be dislodged on chest x-ray.  ENT was consulted with full evaluation.  They believe patient's tracheostomy was out of place since January of this year.  Discussed extensively with patient's family and they decided not to pursue OR replacement.  Patient has maintained on room air without any difficulty after tracheostomy was removed.  ENT will be happy to see patient as needed.    Chronic indwelling Mojica catheter-patient CT of the abdomen revealed thickening and prominent caliber of the right greater than left upper renal collecting system with a staghorn type calculi.  Urinary bladder appeared mildly thickened, which could be due to underdistention or cystitis. Patient is currently followed by [place] [facility] for chronic staghorn calculi and chronic PCN use.  Most recently removed and being monitored for possibility of reinserting.  Dr. Dyer, urology at [facility] reviewed patient's plan of care.  Due to patient's extensive medical comorbidities and fragility her urologist had not pursued replacement of a removed PCNU.  Dr. Dyer's recommendations were to replace chronic Mojica catheter that which was done on 7/14/2024.  He additionally recommended patient have a total of 14 days of culture specific antibiotics, see above.  He additionally recommended palliative care consult due to extensive medical comorbidities.    Constipation-initial CT scan so possibleIleus versus small bowel obstruction. Patient's po meds and tube feeding were held om 7/16 and 7/17, due t probable small bowel seen on KUB 7/16/2024.  Subsequent CT of the abdomen pelvis on 7/18 revealed no acute abdominal findings other than a large ball of stool at the  "patient's rectum.  Patient received milk of molasses enema x 2 for 2 consecutive days and patient has had resolution to constipation with orders to follow at SNF for daily Dulcolax suppository.    Extensive discussions throughout hospitalization with family regarding patient's need for palliative care.  They are open to the prospect of palliative versus hospice care, however at this time given that she has had resolution to her septic state, they would like to resume her care at SNF and discuss further.  I discussed the case today with her father Noel on the phone and he is agreeable to discharge to a new SNF facility.  Patient has been evaluated today 07/22/24 and is stable for discharge. Patient agrees with plan to discharge on 07/22/24    Physical Exam on Discharge:  /74 (BP Location: Right leg, Patient Position: Lying)   Pulse 81   Temp 98.4 °F (36.9 °C) (Axillary)   Resp 18   Ht 160 cm (63\")   Wt 59.4 kg (131 lb)   LMP  (LMP Unknown)   SpO2 97%   BMI 23.21 kg/m²   Physical Exam  Vitals and nursing note reviewed.     Constitutional:       Appearance: She is cachectic. She is ill-appearing and diaphoretic.   HENT:      Head: Normocephalic.      Nose: Congestion and rhinorrhea present.      Mouth/Throat:      Mouth: Mucous membranes are moist.      Pharynx: Oropharynx is clear.   Eyes:      Pupils: Pupils are equal, round, and reactive to light.   Neck:      Comments: Chronic contractures of neck  Cardiovascular:      Rate and Rhythm: Regular rhythm. Tachycardia present.      Pulses: Normal pulses.      Comments: S/ST , up to 144  Pulmonary:      Breath sounds: Wheezing and rhonchi present.      Comments: Trach collar at 9 L  Abdominal:      General: Bowel sounds are decreased. There is no distension.      Palpations: Abdomen is soft.      Tenderness: There is no abdominal tenderness.   Genitourinary:     Comments: Voiding per chronic Mojica catheter  Musculoskeletal:      Cervical back: " Rigidity present.      Right lower leg: No edema.      Left lower leg: No edema.      Comments: Bedbound with chronic fractures of all extremities   Skin:     General: Skin is warm and moist.      Capillary Refill: Capillary refill takes less than 2 seconds.      Coloration: Skin is pale.   Neurological:      Mental Status: She is alert. She is disoriented.      Motor: Weakness, atrophy and abnormal muscle tone present.   Psychiatric:         Attention and Perception: She is inattentive.         Mood and Affect: Affect is flat.         Behavior: Behavior is slowed and withdrawn.         Cognition and Memory: Cognition is impaired.      Comments: Nonverbal at baseline          Condition on Discharge: Stable for discharge back to SNF    Discharge Disposition:  Skilled Nursing Facility (OR - External)    Discharge Medications:     Discharge Medications        Changes to Medications        Instructions Start Date   bisacodyl 10 MG suppository  Commonly known as: DULCOLAX  What changed:   when to take this  reasons to take this   10 mg, Rectal, Daily   Start Date: July 23, 2024            Continue These Medications        Instructions Start Date   acetaminophen 500 MG tablet  Commonly known as: TYLENOL   500 mg, Per G Tube, Every 8 Hours PRN, Not to exceed 3000mg from all sources daily       albuterol (2.5 MG/3ML) 0.083% nebulizer solution  Commonly known as: PROVENTIL   2.5 mg, Nebulization, Every 6 Hours PRN      baclofen 20 MG tablet  Commonly known as: LIORESAL   40 mg, Per G Tube, 3 Times Daily      carboxymethylcellulose 0.5 % solution  Commonly known as: REFRESH PLUS   1 drop, Both Eyes, 2 Times Daily      chlorhexidine 0.12 % solution  Commonly known as: PERIDEX   10 mL, Mouth/Throat, 2 Times Daily      Ferrous Sulfate 300 (60 Fe) MG/5ML solution   300 mg, Per G Tube, 2 Times Daily      fleet enema 7-19 GM/118ML enema   1 enema, Rectal, Daily PRN      HYDROcodone-acetaminophen 7.5-325 MG per tablet  Commonly  known as: NORCO   1 tablet, Oral, Every 4 Hours PRN      hydrOXYzine pamoate 25 MG capsule  Commonly known as: VISTARIL   25 mg, Per G Tube, Every 8 Hours PRN      metoprolol tartrate 50 MG tablet  Commonly known as: LOPRESSOR   50 mg, Per G Tube, 2 Times Daily      MiraLax 17 g packet  Generic drug: polyethylene glycol   17 g, Oral, Daily      OCUSOFT LID SCRUB EX   1 Application, Apply externally, Daily      ondansetron 4 MG/5ML solution  Commonly known as: ZOFRAN   4 mg, Oral, Every 6 Hours PRN      pantoprazole 40 MG EC tablet  Commonly known as: PROTONIX   40 mg, Oral, 2 Times Daily      potassium chloride 20 mEq/15 mL solution  Commonly known as: KAYCIEL   20 mEq, Per G Tube, 2 Times Daily      Renacidin solution   60 mL, Irrigation, 2 Times Daily, Irrigate stratton catheter       saccharomyces boulardii 250 MG capsule  Commonly known as: FLORASTOR   250 mg, Per G Tube, Daily      Scopolamine 1 MG/3DAYS patch   1 patch, Transdermal, Every 72 Hours   Start Date: July 24, 2024     simethicone 40 MG/0.6ML drops  Commonly known as: MYLICON   40 mg, Per G Tube, 2 Times Daily      sodium chloride 0.9 % irrigation  Commonly known as: NS   10 mL, Irrigation, 2 Times Daily, Irrigation for secretions: Hydration      thiamine 100 MG tablet  tablet  Commonly known as: VITAMIN B-1   100 mg, Per G Tube, Daily      tiZANidine 4 MG tablet  Commonly known as: ZANAFLEX   4 mg, Per G Tube, Every 8 Hours             Stop These Medications      Docusate Sodium 150 MG/15ML syrup              See downtown documentation for full progress noteDischarge Diet:   Diet Instructions       Diet: Tube Feeding; Continuous; Peptamen 1.5 continuous, goal rate of 45 mL/HR, with daily water flush 990 cc.      Discharge Diet: Tube Feeding    Feeding Type: Continuous    Formula & Rate: Peptamen 1.5 continuous, goal rate of 45 mL/HR, with daily water flush 990 cc.            Activity at Discharge:   Activity Instructions       Activity as Tolerated               Discharge Instructions:   1.  SNF and parents were instructed to return for medical attention for any new or worsening respiratory distress.  2.  Family was encouraged to continue discussion of palliative needs versus hospice care before returning to the hospital.  3.  Patient is to be discharged to [facility] for continued management and care.  Follow-up Appointments:   No future appointments.    Test Results Pending at Discharge: None    Electronically signed by BERT Asif, 07/22/24, 12:20 CDT.    Time: 40 minutes.

## 2024-07-22 NOTE — CASE MANAGEMENT/SOCIAL WORK
Continued Stay Note  [facility]     Patient Name: Namita Zabala  MRN: [id number]  Today's Date: 7/22/2024    Admit Date: 7/14/2024    Plan: Waiting on decision from [facility]   Discharge Plan       Row Name 07/22/24 0925       Plan    Plan [facility]    Patient/Family in Agreement with Plan yes    Plan Comments Spoke with Connie from [facility] [phone] and they have no beds to offer at present time. Spoke with Hermila from [facility] [phone] and she will review weekend notes and inform if they can offer a bed today.  Will follow.    Addendum:  [facility] can take this pt, informed César NOEL.                    Discharge Codes    No documentation.                       ALONSO RezaW

## 2024-07-22 NOTE — NURSING NOTE
Report called to [facility]. Patient will be transported by [facility] services.

## 2024-07-22 NOTE — PLAN OF CARE
Goal Outcome Evaluation:  Plan of Care Reviewed With: patient        Progress: improving  Outcome Evaluation: Patient being transported to [facility] today. Tube feeding will be continued at facility. New preventive dressing applied to bilateral heels and coccyx area. IV x2 removed. Room air; sats 95% and above today. Belongings sent with patient. Family will be notified of transfer. Safety maintained.

## 2024-07-22 NOTE — DISCHARGE PLACEMENT REQUEST
"Namita Cooper MARCELO (49 y.o. Female)       Date of Birth   1975    Social Security Number       Address   [address] [place], [place] [address]    Home Phone   [phone]    MRN   [id number]       [facility]    Marital Status                               Admission Date   7/14/24    Admission Type   Emergency    Admitting Provider   Elbert Valles MD    Attending Provider   Elebrt Valles MD    Department, Room/Bed   [facility] 3C, 385/1       Discharge Date       Discharge Disposition       Discharge Destination                                 Attending Provider: Elbert Valles MD    Allergies: Contrast Dye (Echo Or Unknown Ct/mr), Ct Contrast, Coconut, Levaquin [Levofloxacin], Nuts, Penicillins, [place]    Isolation: Contact   Infection: ESBL Klebsiella (04/07/22), ESBL E coli (10/13/23), CR Pseudomonas CRPA (04/22/24)   Code Status: No CPR    Ht: 160 cm (63\")   Wt: 59.4 kg (131 lb)    Admission Cmt: None   Principal Problem: Sepsis secondary to UTI [A41.9,N39.0]                   Active Insurance as of 7/14/2024       Primary Coverage       Payor Plan Insurance Group Employer/Plan Group    MEDICARE MEDICARE A & B        Payor Plan Address Payor Plan Phone Number Payor Plan Fax Number Effective Dates    [address] [phone]  7/1/2019 - None Entered    [facility] [id number]         Subscriber Name Subscriber Birth Date Member ID       NAMITA COOPER N 1975 [id number]               Secondary Coverage       Payor Plan Insurance Group Employer/Plan Group    [place]        Payor Plan Address Payor Plan Phone Number Payor Plan Fax Number Effective Dates    [address] [phone]  9/15/2023 - None Entered    [place] [address]         Subscriber Name Subscriber Birth Date Member ID       NAMITA COOPER N 1975 [id number]                     Emergency Contacts        (Rel.) Home Phone Work Phone Mobile " Phone    Noel Johnson (Father) [phone] -- [phone]    Marquita Johnson (Mother) -- -- [phone]    Neida Zabala (Relative) -- -- [phone]

## 2024-07-23 LAB
BACTERIA SPEC AEROBE CULT: NORMAL
BACTERIA SPEC AEROBE CULT: NORMAL

## 2024-11-29 NOTE — PROGRESS NOTES
Palliative Care Daily Progress Note   Chief complaint: goals of care/advanced care planning  Code Status and Medical Interventions:   Ordered at: 22 0411     Level Of Support Discussed With:    Patient     Code Status (Patient has no pulse and is not breathing):    CPR (Attempt to Resuscitate)     Medical Interventions (Patient has pulse or is breathing):    Full Support     Subjective   Medical record reviewed. Events noted. Attending able to speak with patient's spouse further yesterday afternoon and he was agreeable for rehab placement in [place] or [place].  Morning labs revealed she is slightly hyponatremic with sodium at 134 however other labs remain unremarkable. Appears asleep at time of exam, continues to not arouse, follow commands or answer questions. Does not appear in any distress, no visitors at bedside.       Advance Care Planning     Advance Directive Status: Patient does not have advance directive     Due to the Palliative Care Topics Discussed: palliative care, goals of care, care options and discharge options we will establish an advance care plan.   Goals of care: Ongoing.    The patient receives support from her spouse, parent and extended family.  POA/Healthcare Surrogate - Next of kin is her spouse, Grant.     Review of Systems   Unable to perform ROS: patient nonverbal     Pain Assessment  Nonverbal Indicators of Pain: grimace  CPOT and PAINAD Scales: PAINAD (Pain Assessment in Advance Dementia Scale)  PAINAD Breathin-->normal  PAINAD Negative Vocalization: 1-->occasional moan/groan, low speech, negative/disapproving quality (when turning)  PAINAD Facial Expression: 0-->smiling or inexpressive  PAINAD Body Language: 0-->relaxed  PAINAD Consolability: 0-->no need to console  PAINAD Score: 1  Pain Location: abdomen    Objective   Diagnostics: Reviewed      Intake/Output Summary (Last 24 hours) at 2022 0921  Last data filed at 2022 0452  Gross per 24 hour   Intake 8564.42  Resident team notified by writer this morning via secure chat that patient's BP was 103/63, HR 54. Writer spoke with Dr. Sams and resident team face to face about patient's am BP meds. Per Dr. Sams, okay to give scheduled amlodipine and losartan, hold metoprolol.   ml   Output 1800 ml   Net 284.65 ml     Current Facility-Administered Medications   Medication Dose Route Frequency Provider Last Rate Last Admin   • bisacodyl (DULCOLAX) suppository 10 mg  10 mg Rectal Daily Amado Villarreal MD   10 mg at 04/13/22 0912   • FLUoxetine (PROzac) capsule 20 mg  20 mg Oral Nightly Amado Villarreal MD   20 mg at 04/12/22 2051   • fluticasone (FLONASE) 50 MCG/ACT nasal spray 2 spray  2 spray Each Nare BID Frank Ricks MD   2 spray at 04/13/22 0912   • folic acid (FOLVITE) tablet 1 mg  1 mg Oral Daily Verónica Erickson APRN   1 mg at 04/12/22 1321   • hydrALAZINE (APRESOLINE) injection 10 mg  10 mg Intravenous Q4H PRN Amado Villarreal MD       • labetalol (NORMODYNE,TRANDATE) injection 10 mg  10 mg Intravenous Q4H PRN Amado Villarreal MD   10 mg at 04/10/22 1518   • metoprolol succinate XL (TOPROL-XL) 24 hr tablet 50 mg  50 mg Oral Q24H Amado Villarreal MD   50 mg at 04/12/22 0936   • neomycin-polymyxin-hydrocortisone (CORTISPORIN) otic suspension 4 drop  4 drop Both Ears Q6H Fermin Mcclure DO   4 drop at 04/13/22 0554   • ondansetron (ZOFRAN) tablet 4 mg  4 mg Oral Q6H PRN Frank Ricks MD        Or   • ondansetron (ZOFRAN) injection 4 mg  4 mg Intravenous Q6H PRN Frank Ricks MD   4 mg at 04/07/22 0904   • oxymetazoline (AFRIN) nasal spray 2 spray  2 spray Nasal BID Frank Ricks MD   2 spray at 04/13/22 0916   • pantoprazole (PROTONIX) EC tablet 40 mg  40 mg Oral BID AC Amado Villarreal MD   40 mg at 04/12/22 1831   • rivaroxaban (XARELTO) tablet 20 mg  20 mg Oral Nightly Amado Villarreal MD   20 mg at 04/12/22 2051   • sennosides-docusate (PERICOLACE) 8.6-50 MG per tablet 2 tablet  2 tablet Oral Daily Amado Villarreal MD   2 tablet at 04/12/22 0936   • sodium bicarbonate tablet 650 mg  650 mg Oral BID Amado Villarreal MD       • sodium chloride 0.9 % flush 10 mL  10 mL Intravenous PRN Frank Ricks MD       • sodium chloride 0.9 % flush 10 mL  10 mL Intravenous PRN  "Frank Ricks MD       • sodium chloride 0.9 % flush 10 mL  10 mL Intravenous PRN Frank Ricks MD       • sodium chloride 0.9 % flush 10 mL  10 mL Intravenous Q12H Frank Ricks MD   10 mL at 04/12/22 0937   • sodium chloride 0.9 % flush 10 mL  10 mL Intravenous PRN Frank Ricks MD   10 mL at 04/10/22 1518   • SUMAtriptan (IMITREX) tablet 50 mg  50 mg Oral Once Frank Ricks MD            hydrALAZINE  •  labetalol  •  ondansetron **OR** ondansetron  •  sodium chloride  •  [COMPLETED] Insert peripheral IV **AND** sodium chloride  •  [COMPLETED] Insert peripheral IV **AND** sodium chloride  •  sodium chloride    Assessment:  Vital Signs: /91 (BP Location: Left arm, Patient Position: Lying)   Pulse 94   Temp 98.4 °F (36.9 °C) (Oral)   Resp 16   Ht 170.2 cm (67\")   Wt 68 kg (150 lb)   SpO2 100%   BMI 23.49 kg/m²     Physical Exam  Vitals and nursing note reviewed.   Constitutional:       General: She is sleeping. She is not in acute distress.     Appearance: She is ill-appearing.   HENT:      Head: Normocephalic and atraumatic.   Eyes:      General: Lids are normal.   Cardiovascular:      Rate and Rhythm: Tachycardia present.      Heart sounds: Normal heart sounds.   Pulmonary:      Effort: Pulmonary effort is normal.      Breath sounds: Decreased breath sounds present.   Abdominal:      General: Bowel sounds are normal. There is no distension.      Palpations: Abdomen is soft.      Comments: Mepilex to midline abdominal incision, CDI.    Genitourinary:     Comments: Left sided nephrostomy.  Musculoskeletal:      Right foot: Foot drop present.      Left foot: Foot drop present.      Comments: Multipodus boots present to BLE    Skin:     General: Skin is warm and dry.      Coloration: Skin is pale.   Neurological:      Comments: CARLI   Psychiatric:         Speech: She is noncommunicative.         Behavior: Behavior is uncooperative.         Cognition and Memory: Cognition is " impaired.      Comments: CARLI     Patient status: Disease state: Controlled with current treatments.  Functional status: Palliative Performance Scale Score: Performance 10% based on the following measures: Ambulation: Totally bed bound, Activity and Evidence of Disease: Unable to do any work, extensive evidence of disease, Self-Care: Total care required,  Intake: Mouth care only, LOC: Drowsy or comatose. Nutritional status: Albumin 2.30.Body mass index is 23.49 kg/m².    Impression/Problem List:  1.    Altered mental status  2.    Impaired mobility and ability to perform activities of daily living  3.    Severe malnutrition  4.    Urinary tract infection  5.    Sepsis secondary to UTI  6.    Hypertension  7.    Malfunctioning nephrostomy tube  8.    Lactic acidosis  9.    Impaired functioning of gallbladder (sludge-filled and 20% EF per imaging)  10.  Hepatic steatosis  11.  Anemia  12.  Intractable nausea and vomiting  13.  Pleural effusion, left  14.  Folate deficiency  15.  Anxiety  16.  Hyponatremia  17.  History of COVID-19 (August 2021)     Plans/Recommendations     1. Goals of care include CODE STATUS CPR with full support interventions.    2. Palliative care encounter  - Prognosis is guarded long-term secondary to poor performance status, altered mental status, severe malnutrition, urinary tract infection, sepsis, hepatic steatosis, anemia, impaired gallbladder functioning, recent complex medical history including renal failure requiring temporary dialysis, nephrostomy placement, bladder rupture and many other comorbidities listed above.   - Discussed with attending, he met with patient's spouse yesterday afternoon and shared spouse is agreeable to discharge to skilled nursing facility. SW/CM following and referrals have been sent.   - Will plan to follow up with patient/spouse if she remains hospitalized on Friday or sooner if needed.      Thank you for allowing us to participate in patient's plan of care.  Palliative Care Team will continue to follow patient.     Time spent: 25 minutes spent reviewing medical and medication records, assessing and examining patient, discussing with family, answering questions, providing some guidance about a plan and documentation of care, and coordinating care with other healthcare members, with > 50% time spent face to face.     Nneka Barton, APRN  4/13/2022

## (undated) DEVICE — PK TURNOVER RM ADV

## (undated) DEVICE — SUT MNCRYL 4/0 PS2 27IN UD MCP426H

## (undated) DEVICE — PAD MINOR UNIVERSAL: Brand: MEDLINE INDUSTRIES, INC.

## (undated) DEVICE — CVR PROB GEN PURP W ISOSILK 6X48

## (undated) DEVICE — DRSNG SURESITE WNDW 4X4.5

## (undated) DEVICE — APPL CHLORAPREP HI/LITE 26ML ORNG

## (undated) DEVICE — KT CATH TAL VENATRAC PALINDROM INSRT STY 23CM

## (undated) DEVICE — RADIFOCUS GLIDEWIRE ADVANTAGE GUIDEWIRE: Brand: GLIDEWIRE ADVANTAGE

## (undated) DEVICE — PROXIMATE RH ROTATING HEAD SKIN STAPLERS (35 WIDE) CONTAINS 35 STAINLESS STEEL STAPLES: Brand: PROXIMATE

## (undated) DEVICE — GLV SURG DERMASSURE GRN LF PF 8.0

## (undated) DEVICE — SUT ETHLN 3/0 FS1 30IN 669H

## (undated) DEVICE — SYR LUERLOK 20CC BX/50

## (undated) DEVICE — SYR SLP TP 10ML DISP

## (undated) DEVICE — SPNG GZ 2S 2X2 8PLY STRL PK/2

## (undated) DEVICE — ADHS SKIN PREMIERPRO EXOFIN TOPICAL HI/VISC .5ML

## (undated) DEVICE — INTENDED FOR TISSUE SEPARATION, AND OTHER PROCEDURES THAT REQUIRE A SHARP SURGICAL BLADE TO PUNCTURE OR CUT.: Brand: BARD-PARKER ® STAINLESS STEEL BLADES

## (undated) DEVICE — SYR PRECISIONGLIDE LL 5CC 20X1 1/2IN

## (undated) DEVICE — GLV SURG SENSICARE W/ALOE PF LF 7.5 STRL

## (undated) DEVICE — SNAP KOVER: Brand: UNBRANDED